# Patient Record
Sex: MALE | Race: WHITE | NOT HISPANIC OR LATINO | Employment: OTHER | URBAN - METROPOLITAN AREA
[De-identification: names, ages, dates, MRNs, and addresses within clinical notes are randomized per-mention and may not be internally consistent; named-entity substitution may affect disease eponyms.]

---

## 2017-01-02 ENCOUNTER — TRANSCRIBE ORDERS (OUTPATIENT)
Dept: LAB | Facility: CLINIC | Age: 82
End: 2017-01-02

## 2017-01-02 ENCOUNTER — APPOINTMENT (OUTPATIENT)
Dept: LAB | Facility: CLINIC | Age: 82
End: 2017-01-02
Payer: MEDICARE

## 2017-01-02 DIAGNOSIS — E11.9 TYPE 2 DIABETES MELLITUS WITHOUT COMPLICATIONS (HCC): ICD-10-CM

## 2017-01-02 DIAGNOSIS — I10 ESSENTIAL (PRIMARY) HYPERTENSION: ICD-10-CM

## 2017-01-02 DIAGNOSIS — I25.10 ATHEROSCLEROTIC HEART DISEASE OF NATIVE CORONARY ARTERY WITHOUT ANGINA PECTORIS: ICD-10-CM

## 2017-01-02 DIAGNOSIS — N28.9 DISORDER OF KIDNEY AND URETER: ICD-10-CM

## 2017-01-02 LAB
ANION GAP SERPL CALCULATED.3IONS-SCNC: 6 MMOL/L (ref 4–13)
BASOPHILS # BLD AUTO: 0.03 THOUSANDS/ΜL (ref 0–0.1)
BASOPHILS NFR BLD AUTO: 1 % (ref 0–1)
BUN SERPL-MCNC: 39 MG/DL (ref 5–25)
CALCIUM SERPL-MCNC: 8.8 MG/DL (ref 8.3–10.1)
CHLORIDE SERPL-SCNC: 107 MMOL/L (ref 100–108)
CHOLEST SERPL-MCNC: 162 MG/DL (ref 50–200)
CO2 SERPL-SCNC: 27 MMOL/L (ref 21–32)
CREAT SERPL-MCNC: 1.97 MG/DL (ref 0.6–1.3)
EOSINOPHIL # BLD AUTO: 0.37 THOUSAND/ΜL (ref 0–0.61)
EOSINOPHIL NFR BLD AUTO: 6 % (ref 0–6)
ERYTHROCYTE [DISTWIDTH] IN BLOOD BY AUTOMATED COUNT: 12.8 % (ref 11.6–15.1)
GFR SERPL CREATININE-BSD FRML MDRD: 33 ML/MIN/1.73SQ M
GLUCOSE SERPL-MCNC: 123 MG/DL (ref 65–140)
HCT VFR BLD AUTO: 38.9 % (ref 36.5–49.3)
HDLC SERPL-MCNC: 60 MG/DL (ref 40–60)
HGB BLD-MCNC: 12.7 G/DL (ref 12–17)
LDLC SERPL CALC-MCNC: 88 MG/DL (ref 0–100)
LYMPHOCYTES # BLD AUTO: 1.63 THOUSANDS/ΜL (ref 0.6–4.47)
LYMPHOCYTES NFR BLD AUTO: 25 % (ref 14–44)
MCH RBC QN AUTO: 30.4 PG (ref 26.8–34.3)
MCHC RBC AUTO-ENTMCNC: 32.6 G/DL (ref 31.4–37.4)
MCV RBC AUTO: 93 FL (ref 82–98)
MONOCYTES # BLD AUTO: 0.58 THOUSAND/ΜL (ref 0.17–1.22)
MONOCYTES NFR BLD AUTO: 9 % (ref 4–12)
NEUTROPHILS # BLD AUTO: 3.93 THOUSANDS/ΜL (ref 1.85–7.62)
NEUTS SEG NFR BLD AUTO: 59 % (ref 43–75)
NRBC BLD AUTO-RTO: 0 /100 WBCS
PLATELET # BLD AUTO: 197 THOUSANDS/UL (ref 149–390)
PMV BLD AUTO: 10.1 FL (ref 8.9–12.7)
POTASSIUM SERPL-SCNC: 4.5 MMOL/L (ref 3.5–5.3)
RBC # BLD AUTO: 4.18 MILLION/UL (ref 3.88–5.62)
SODIUM SERPL-SCNC: 140 MMOL/L (ref 136–145)
TRIGL SERPL-MCNC: 71 MG/DL
WBC # BLD AUTO: 6.55 THOUSAND/UL (ref 4.31–10.16)

## 2017-01-02 PROCEDURE — 36415 COLL VENOUS BLD VENIPUNCTURE: CPT

## 2017-01-02 PROCEDURE — 80061 LIPID PANEL: CPT

## 2017-01-02 PROCEDURE — 85025 COMPLETE CBC W/AUTO DIFF WBC: CPT

## 2017-01-02 PROCEDURE — 80048 BASIC METABOLIC PNL TOTAL CA: CPT

## 2017-01-05 ENCOUNTER — GENERIC CONVERSION - ENCOUNTER (OUTPATIENT)
Dept: OTHER | Facility: OTHER | Age: 82
End: 2017-01-05

## 2017-01-18 ENCOUNTER — ALLSCRIPTS OFFICE VISIT (OUTPATIENT)
Dept: OTHER | Facility: OTHER | Age: 82
End: 2017-01-18

## 2017-01-18 ENCOUNTER — TRANSCRIBE ORDERS (OUTPATIENT)
Dept: ADMINISTRATIVE | Facility: HOSPITAL | Age: 82
End: 2017-01-18

## 2017-01-18 ENCOUNTER — APPOINTMENT (OUTPATIENT)
Dept: LAB | Facility: HOSPITAL | Age: 82
End: 2017-01-18
Attending: INTERNAL MEDICINE
Payer: MEDICARE

## 2017-01-18 DIAGNOSIS — N18.30 CHRONIC KIDNEY DISEASE, STAGE III (MODERATE) (HCC): ICD-10-CM

## 2017-01-18 DIAGNOSIS — N18.30 CHRONIC KIDNEY DISEASE, STAGE III (MODERATE) (HCC): Primary | ICD-10-CM

## 2017-01-18 LAB
25(OH)D3 SERPL-MCNC: 17.8 NG/ML (ref 30–100)
ANION GAP SERPL CALCULATED.3IONS-SCNC: 8 MMOL/L (ref 4–13)
BACTERIA UR QL AUTO: ABNORMAL /HPF
BASOPHILS # BLD AUTO: 0.06 THOUSANDS/ΜL (ref 0–0.1)
BASOPHILS NFR BLD AUTO: 1 % (ref 0–1)
BILIRUB UR QL STRIP: NEGATIVE
BUN SERPL-MCNC: 38 MG/DL (ref 5–25)
CALCIUM SERPL-MCNC: 8.7 MG/DL (ref 8.3–10.1)
CHLORIDE SERPL-SCNC: 101 MMOL/L (ref 100–108)
CLARITY UR: CLEAR
CO2 SERPL-SCNC: 28 MMOL/L (ref 21–32)
COLOR UR: YELLOW
CREAT SERPL-MCNC: 1.89 MG/DL (ref 0.6–1.3)
EOSINOPHIL # BLD AUTO: 0.36 THOUSAND/ΜL (ref 0–0.61)
EOSINOPHIL NFR BLD AUTO: 5 % (ref 0–6)
ERYTHROCYTE [DISTWIDTH] IN BLOOD BY AUTOMATED COUNT: 12.6 % (ref 11.6–15.1)
GFR SERPL CREATININE-BSD FRML MDRD: 34.6 ML/MIN/1.73SQ M
GLUCOSE SERPL-MCNC: 283 MG/DL (ref 65–140)
GLUCOSE UR STRIP-MCNC: NEGATIVE MG/DL
HCT VFR BLD AUTO: 39.4 % (ref 36.5–49.3)
HGB BLD-MCNC: 12.6 G/DL (ref 12–17)
HGB UR QL STRIP.AUTO: NEGATIVE
HYALINE CASTS #/AREA URNS LPF: ABNORMAL /LPF
KETONES UR STRIP-MCNC: ABNORMAL MG/DL
LEUKOCYTE ESTERASE UR QL STRIP: NEGATIVE
LYMPHOCYTES # BLD AUTO: 1.67 THOUSANDS/ΜL (ref 0.6–4.47)
LYMPHOCYTES NFR BLD AUTO: 25 % (ref 14–44)
MCH RBC QN AUTO: 29.9 PG (ref 26.8–34.3)
MCHC RBC AUTO-ENTMCNC: 32 G/DL (ref 31.4–37.4)
MCV RBC AUTO: 94 FL (ref 82–98)
MONOCYTES # BLD AUTO: 0.5 THOUSAND/ΜL (ref 0.17–1.22)
MONOCYTES NFR BLD AUTO: 7 % (ref 4–12)
MUCOUS THREADS UR QL AUTO: ABNORMAL
NEUTROPHILS # BLD AUTO: 4.22 THOUSANDS/ΜL (ref 1.85–7.62)
NEUTS SEG NFR BLD AUTO: 62 % (ref 43–75)
NITRITE UR QL STRIP: NEGATIVE
NON-SQ EPI CELLS URNS QL MICRO: ABNORMAL /HPF
NRBC BLD AUTO-RTO: 0 /100 WBCS
PH UR STRIP.AUTO: 6 [PH] (ref 4.5–8)
PHOSPHATE SERPL-MCNC: 3.8 MG/DL (ref 2.3–4.1)
PLATELET # BLD AUTO: 200 THOUSANDS/UL (ref 149–390)
PMV BLD AUTO: 9.9 FL (ref 8.9–12.7)
POTASSIUM SERPL-SCNC: 4.5 MMOL/L (ref 3.5–5.3)
PROT UR STRIP-MCNC: ABNORMAL MG/DL
PTH-INTACT SERPL-MCNC: 78.9 PG/ML (ref 14–72)
RBC # BLD AUTO: 4.21 MILLION/UL (ref 3.88–5.62)
RBC #/AREA URNS AUTO: ABNORMAL /HPF
SODIUM SERPL-SCNC: 137 MMOL/L (ref 136–145)
SP GR UR STRIP.AUTO: 1.02 (ref 1–1.03)
URATE SERPL-MCNC: 7.4 MG/DL (ref 4.2–8)
UROBILINOGEN UR QL STRIP.AUTO: 0.2 E.U./DL
WBC # BLD AUTO: 6.83 THOUSAND/UL (ref 4.31–10.16)
WBC #/AREA URNS AUTO: ABNORMAL /HPF

## 2017-01-18 PROCEDURE — 81001 URINALYSIS AUTO W/SCOPE: CPT

## 2017-01-18 PROCEDURE — 80048 BASIC METABOLIC PNL TOTAL CA: CPT

## 2017-01-18 PROCEDURE — 83970 ASSAY OF PARATHORMONE: CPT

## 2017-01-18 PROCEDURE — 84550 ASSAY OF BLOOD/URIC ACID: CPT

## 2017-01-18 PROCEDURE — 82043 UR ALBUMIN QUANTITATIVE: CPT

## 2017-01-18 PROCEDURE — 82306 VITAMIN D 25 HYDROXY: CPT

## 2017-01-18 PROCEDURE — 85025 COMPLETE CBC W/AUTO DIFF WBC: CPT

## 2017-01-18 PROCEDURE — 82570 ASSAY OF URINE CREATININE: CPT

## 2017-01-18 PROCEDURE — 36415 COLL VENOUS BLD VENIPUNCTURE: CPT

## 2017-01-18 PROCEDURE — 84100 ASSAY OF PHOSPHORUS: CPT

## 2017-01-19 ENCOUNTER — GENERIC CONVERSION - ENCOUNTER (OUTPATIENT)
Dept: OTHER | Facility: OTHER | Age: 82
End: 2017-01-19

## 2017-01-19 LAB
CREAT UR-MCNC: 245 MG/DL
MICROALBUMIN UR-MCNC: 489 MG/L (ref 0–20)
MICROALBUMIN/CREAT 24H UR: 200 MG/G CREATININE (ref 0–30)

## 2017-01-20 ENCOUNTER — HOSPITAL ENCOUNTER (OUTPATIENT)
Dept: ULTRASOUND IMAGING | Facility: HOSPITAL | Age: 82
Discharge: HOME | End: 2017-01-20
Attending: INTERNAL MEDICINE
Payer: MEDICARE

## 2017-01-20 DIAGNOSIS — N18.30 CHRONIC KIDNEY DISEASE, STAGE III (MODERATE) (HCC): ICD-10-CM

## 2017-01-20 PROCEDURE — 76770 US EXAM ABDO BACK WALL COMP: CPT

## 2017-01-24 ENCOUNTER — ALLSCRIPTS OFFICE VISIT (OUTPATIENT)
Dept: OTHER | Facility: OTHER | Age: 82
End: 2017-01-24

## 2017-03-10 ENCOUNTER — ALLSCRIPTS OFFICE VISIT (OUTPATIENT)
Dept: OTHER | Facility: OTHER | Age: 82
End: 2017-03-10

## 2017-03-10 DIAGNOSIS — I25.10 ATHEROSCLEROTIC HEART DISEASE OF NATIVE CORONARY ARTERY WITHOUT ANGINA PECTORIS: ICD-10-CM

## 2017-03-10 DIAGNOSIS — R06.09 OTHER FORMS OF DYSPNEA: ICD-10-CM

## 2017-03-23 ENCOUNTER — HOSPITAL ENCOUNTER (OUTPATIENT)
Dept: NON INVASIVE DIAGNOSTICS | Facility: CLINIC | Age: 82
Discharge: HOME | End: 2017-03-23
Payer: MEDICARE

## 2017-03-23 ENCOUNTER — GENERIC CONVERSION - ENCOUNTER (OUTPATIENT)
Dept: OTHER | Facility: OTHER | Age: 82
End: 2017-03-23

## 2017-03-23 DIAGNOSIS — R06.09 OTHER FORMS OF DYSPNEA: ICD-10-CM

## 2017-03-23 DIAGNOSIS — I25.10 ATHEROSCLEROTIC HEART DISEASE OF NATIVE CORONARY ARTERY WITHOUT ANGINA PECTORIS: ICD-10-CM

## 2017-03-23 PROCEDURE — 93017 CV STRESS TEST TRACING ONLY: CPT

## 2017-03-23 PROCEDURE — A9502 TC99M TETROFOSMIN: HCPCS

## 2017-03-23 PROCEDURE — 78452 HT MUSCLE IMAGE SPECT MULT: CPT

## 2017-03-24 LAB
MAX DIASTOLIC BP: 74 MMHG
MAX HEART RATE: 72 BPM
MAX PREDICTED HEART RATE: 141 BPM
MAX. SYSTOLIC BP: 136 MMHG
PROTOCOL NAME: NORMAL
REASON FOR TERMINATION: NORMAL
TARGET HR FORMULA: NORMAL
TIME IN EXERCISE PHASE: 180 S

## 2017-03-28 ENCOUNTER — HOSPITAL ENCOUNTER (OUTPATIENT)
Dept: NON INVASIVE DIAGNOSTICS | Facility: CLINIC | Age: 82
Discharge: HOME | End: 2017-03-28
Payer: MEDICARE

## 2017-03-28 DIAGNOSIS — R06.09 OTHER FORMS OF DYSPNEA: ICD-10-CM

## 2017-03-28 PROCEDURE — 93306 TTE W/DOPPLER COMPLETE: CPT

## 2017-03-29 ENCOUNTER — GENERIC CONVERSION - ENCOUNTER (OUTPATIENT)
Dept: OTHER | Facility: OTHER | Age: 82
End: 2017-03-29

## 2017-04-18 ENCOUNTER — ALLSCRIPTS OFFICE VISIT (OUTPATIENT)
Dept: OTHER | Facility: OTHER | Age: 82
End: 2017-04-18

## 2017-04-24 ENCOUNTER — APPOINTMENT (OUTPATIENT)
Dept: LAB | Facility: CLINIC | Age: 82
End: 2017-04-24
Payer: MEDICARE

## 2017-04-24 DIAGNOSIS — L98.9 DISORDER OF SKIN OR SUBCUTANEOUS TISSUE: ICD-10-CM

## 2017-04-24 DIAGNOSIS — N28.9 DISORDER OF KIDNEY AND URETER: ICD-10-CM

## 2017-04-24 DIAGNOSIS — I25.10 ATHEROSCLEROTIC HEART DISEASE OF NATIVE CORONARY ARTERY WITHOUT ANGINA PECTORIS: ICD-10-CM

## 2017-04-24 DIAGNOSIS — E11.9 TYPE 2 DIABETES MELLITUS WITHOUT COMPLICATIONS (HCC): ICD-10-CM

## 2017-04-24 LAB
ANION GAP SERPL CALCULATED.3IONS-SCNC: 6 MMOL/L (ref 4–13)
BASOPHILS # BLD AUTO: 0.03 THOUSANDS/ΜL (ref 0–0.1)
BASOPHILS NFR BLD AUTO: 1 % (ref 0–1)
BUN SERPL-MCNC: 24 MG/DL (ref 5–25)
CALCIUM SERPL-MCNC: 9.2 MG/DL (ref 8.3–10.1)
CHLORIDE SERPL-SCNC: 104 MMOL/L (ref 100–108)
CHOLEST SERPL-MCNC: 171 MG/DL (ref 50–200)
CO2 SERPL-SCNC: 29 MMOL/L (ref 21–32)
CREAT SERPL-MCNC: 1.39 MG/DL (ref 0.6–1.3)
EOSINOPHIL # BLD AUTO: 0.26 THOUSAND/ΜL (ref 0–0.61)
EOSINOPHIL NFR BLD AUTO: 5 % (ref 0–6)
ERYTHROCYTE [DISTWIDTH] IN BLOOD BY AUTOMATED COUNT: 13.1 % (ref 11.6–15.1)
EST. AVERAGE GLUCOSE BLD GHB EST-MCNC: 163 MG/DL
GFR SERPL CREATININE-BSD FRML MDRD: 49.3 ML/MIN/1.73SQ M
GLUCOSE P FAST SERPL-MCNC: 107 MG/DL (ref 65–99)
HBA1C MFR BLD: 7.3 % (ref 4.2–6.3)
HCT VFR BLD AUTO: 39.4 % (ref 36.5–49.3)
HDLC SERPL-MCNC: 49 MG/DL (ref 40–60)
HGB BLD-MCNC: 13.1 G/DL (ref 12–17)
LDLC SERPL CALC-MCNC: 107 MG/DL (ref 0–100)
LYMPHOCYTES # BLD AUTO: 1.49 THOUSANDS/ΜL (ref 0.6–4.47)
LYMPHOCYTES NFR BLD AUTO: 31 % (ref 14–44)
MCH RBC QN AUTO: 30.6 PG (ref 26.8–34.3)
MCHC RBC AUTO-ENTMCNC: 33.2 G/DL (ref 31.4–37.4)
MCV RBC AUTO: 92 FL (ref 82–98)
MONOCYTES # BLD AUTO: 0.45 THOUSAND/ΜL (ref 0.17–1.22)
MONOCYTES NFR BLD AUTO: 9 % (ref 4–12)
NEUTROPHILS # BLD AUTO: 2.61 THOUSANDS/ΜL (ref 1.85–7.62)
NEUTS SEG NFR BLD AUTO: 54 % (ref 43–75)
NRBC BLD AUTO-RTO: 0 /100 WBCS
PLATELET # BLD AUTO: 206 THOUSANDS/UL (ref 149–390)
PMV BLD AUTO: 10.2 FL (ref 8.9–12.7)
POTASSIUM SERPL-SCNC: 4.6 MMOL/L (ref 3.5–5.3)
RBC # BLD AUTO: 4.28 MILLION/UL (ref 3.88–5.62)
SODIUM SERPL-SCNC: 139 MMOL/L (ref 136–145)
TRIGL SERPL-MCNC: 74 MG/DL
WBC # BLD AUTO: 4.85 THOUSAND/UL (ref 4.31–10.16)

## 2017-04-24 PROCEDURE — 80048 BASIC METABOLIC PNL TOTAL CA: CPT

## 2017-04-24 PROCEDURE — 80061 LIPID PANEL: CPT

## 2017-04-24 PROCEDURE — 83036 HEMOGLOBIN GLYCOSYLATED A1C: CPT

## 2017-04-24 PROCEDURE — 85025 COMPLETE CBC W/AUTO DIFF WBC: CPT

## 2017-04-24 PROCEDURE — 36415 COLL VENOUS BLD VENIPUNCTURE: CPT

## 2017-05-01 ENCOUNTER — GENERIC CONVERSION - ENCOUNTER (OUTPATIENT)
Dept: OTHER | Facility: OTHER | Age: 82
End: 2017-05-01

## 2017-05-01 ENCOUNTER — ALLSCRIPTS OFFICE VISIT (OUTPATIENT)
Dept: OTHER | Facility: OTHER | Age: 82
End: 2017-05-01

## 2017-09-01 DIAGNOSIS — M54.9 DORSALGIA: ICD-10-CM

## 2017-09-01 DIAGNOSIS — Z00.00 ENCOUNTER FOR GENERAL ADULT MEDICAL EXAMINATION WITHOUT ABNORMAL FINDINGS: ICD-10-CM

## 2017-09-01 DIAGNOSIS — I10 ESSENTIAL (PRIMARY) HYPERTENSION: ICD-10-CM

## 2017-09-01 DIAGNOSIS — E78.5 HYPERLIPIDEMIA: ICD-10-CM

## 2017-09-01 DIAGNOSIS — E11.9 TYPE 2 DIABETES MELLITUS WITHOUT COMPLICATIONS (HCC): ICD-10-CM

## 2017-09-01 DIAGNOSIS — I25.10 ATHEROSCLEROTIC HEART DISEASE OF NATIVE CORONARY ARTERY WITHOUT ANGINA PECTORIS: ICD-10-CM

## 2017-09-02 ENCOUNTER — TRANSCRIBE ORDERS (OUTPATIENT)
Dept: LAB | Facility: CLINIC | Age: 82
End: 2017-09-02

## 2017-09-02 ENCOUNTER — APPOINTMENT (OUTPATIENT)
Dept: LAB | Facility: CLINIC | Age: 82
End: 2017-09-02
Payer: MEDICARE

## 2017-09-02 DIAGNOSIS — Z00.00 ENCOUNTER FOR GENERAL ADULT MEDICAL EXAMINATION WITHOUT ABNORMAL FINDINGS: ICD-10-CM

## 2017-09-02 DIAGNOSIS — M54.9 DORSALGIA: ICD-10-CM

## 2017-09-02 DIAGNOSIS — E11.9 TYPE 2 DIABETES MELLITUS WITHOUT COMPLICATIONS (HCC): ICD-10-CM

## 2017-09-02 DIAGNOSIS — I25.10 ATHEROSCLEROTIC HEART DISEASE OF NATIVE CORONARY ARTERY WITHOUT ANGINA PECTORIS: ICD-10-CM

## 2017-09-02 DIAGNOSIS — E78.5 HYPERLIPIDEMIA: ICD-10-CM

## 2017-09-02 DIAGNOSIS — I10 ESSENTIAL (PRIMARY) HYPERTENSION: ICD-10-CM

## 2017-09-02 LAB
ANION GAP SERPL CALCULATED.3IONS-SCNC: 4 MMOL/L (ref 4–13)
BUN SERPL-MCNC: 30 MG/DL (ref 5–25)
CALCIUM SERPL-MCNC: 9.1 MG/DL (ref 8.3–10.1)
CHLORIDE SERPL-SCNC: 108 MMOL/L (ref 100–108)
CHOLEST SERPL-MCNC: 181 MG/DL (ref 50–200)
CO2 SERPL-SCNC: 27 MMOL/L (ref 21–32)
CREAT SERPL-MCNC: 1.68 MG/DL (ref 0.6–1.3)
EST. AVERAGE GLUCOSE BLD GHB EST-MCNC: 160 MG/DL
GFR SERPL CREATININE-BSD FRML MDRD: 38 ML/MIN/1.73SQ M
GLUCOSE P FAST SERPL-MCNC: 96 MG/DL (ref 65–99)
HBA1C MFR BLD: 7.2 % (ref 4.2–6.3)
HDLC SERPL-MCNC: 52 MG/DL (ref 40–60)
LDLC SERPL CALC-MCNC: 112 MG/DL (ref 0–100)
POTASSIUM SERPL-SCNC: 4.5 MMOL/L (ref 3.5–5.3)
SODIUM SERPL-SCNC: 139 MMOL/L (ref 136–145)
TRIGL SERPL-MCNC: 85 MG/DL

## 2017-09-02 PROCEDURE — 36415 COLL VENOUS BLD VENIPUNCTURE: CPT

## 2017-09-02 PROCEDURE — 83036 HEMOGLOBIN GLYCOSYLATED A1C: CPT

## 2017-09-02 PROCEDURE — 80061 LIPID PANEL: CPT

## 2017-09-02 PROCEDURE — 80048 BASIC METABOLIC PNL TOTAL CA: CPT

## 2017-09-07 ENCOUNTER — ALLSCRIPTS OFFICE VISIT (OUTPATIENT)
Dept: OTHER | Facility: OTHER | Age: 82
End: 2017-09-07

## 2017-09-08 ENCOUNTER — LAB REQUISITION (OUTPATIENT)
Dept: LAB | Facility: HOSPITAL | Age: 82
End: 2017-09-08
Payer: MEDICARE

## 2017-09-08 ENCOUNTER — ALLSCRIPTS OFFICE VISIT (OUTPATIENT)
Dept: OTHER | Facility: OTHER | Age: 82
End: 2017-09-08

## 2017-09-08 DIAGNOSIS — L98.9 DISORDER OF SKIN OR SUBCUTANEOUS TISSUE: ICD-10-CM

## 2017-09-08 PROCEDURE — 88305 TISSUE EXAM BY PATHOLOGIST: CPT | Performed by: DERMATOLOGY

## 2017-09-18 ENCOUNTER — GENERIC CONVERSION - ENCOUNTER (OUTPATIENT)
Dept: OTHER | Facility: OTHER | Age: 82
End: 2017-09-18

## 2017-09-20 ENCOUNTER — GENERIC CONVERSION - ENCOUNTER (OUTPATIENT)
Dept: OTHER | Facility: OTHER | Age: 82
End: 2017-09-20

## 2017-09-20 ENCOUNTER — APPOINTMENT (OUTPATIENT)
Dept: LAB | Facility: CLINIC | Age: 82
End: 2017-09-20
Payer: MEDICARE

## 2017-09-20 ENCOUNTER — TRANSCRIBE ORDERS (OUTPATIENT)
Dept: LAB | Facility: CLINIC | Age: 82
End: 2017-09-20

## 2017-09-20 DIAGNOSIS — R97.20 ELEVATED PROSTATE SPECIFIC ANTIGEN (PSA): Primary | ICD-10-CM

## 2017-09-20 DIAGNOSIS — R97.20 ELEVATED PROSTATE SPECIFIC ANTIGEN (PSA): ICD-10-CM

## 2017-09-20 LAB — PSA SERPL-MCNC: 7.2 NG/ML (ref 0–4)

## 2017-09-20 PROCEDURE — 84153 ASSAY OF PSA TOTAL: CPT

## 2017-10-02 ENCOUNTER — ALLSCRIPTS OFFICE VISIT (OUTPATIENT)
Dept: OTHER | Facility: OTHER | Age: 82
End: 2017-10-02

## 2017-10-03 NOTE — PROGRESS NOTES
Assessment  Assessed    1  2-vessel coronary artery disease (414 00) (I25 10)   2  Encounter for monitoring antiplatelet therapy (J26 96,K11 95) (Z51 81,Z79 02)   3  Hyperlipidemia (272 4) (E78 5)   4  Hypertension (401 9) (I10)    Discussion/Summary  Cardiology Discussion Summary Free Text Note Form [facility]:   Patient with multiple medical problems who seems to be doing reasonably well from cardiac standpoint  Previous studies reviewed with patient  Medications reviewed and possible side effects discussed  concepts of cardiovascular disease , signs and symptoms of heart disease  Dietary and risk factor modification reinforced  All questions answered  Safety measures reviewed  Patient advised to report any problems prompting medical attention  Symptoms to watch out from cardiac standpoint discussed at length with patient  Patient has no specific contraindication to proceed with the planned surgery  Patient has been instructed to continue all his medications including anti-platelet agents  Patient had a few questions which were answered  Previous cardiac studies were reviewed with the patient  Follow-up in 6 months  Follow-up with Dr Humble Lora  Goals and Barriers: The patient has the current Goals: Compliance with medications none  The patent has the current Barriers: None  Patient's Capacity to Self-Care: Patient is able to Self-Care  Patient Education: Educational resources provided: Please see discussion summary  Medication SE Review and Pt Understands Tx: Possible side effects of new medications were reviewed with the patient/guardian today  Counseling Documentation With Imm: The patient was counseled regarding instructions for management,-risk factor reductions,-impressions,-risks and benefits of treatment options,-importance of compliance with treatment  Chief Complaint  Chief Complaint Chronic Condition [facility]: Patient is here today for follow up of chronic conditions described in HPI  History of Present Illness  Cardiology [facility] Free Text Note Form [facility]: Patient presents for follow-up visit  Patient denies any history of chest pain shortness of breath  Patient denies any history of leg edema or orthopnea PND  No history of presyncope syncope  Patient states compliance with the present list of medications  Patient undergoing Moh's surgery at [facility] in the next 1 to 2 weeks  Patient denies any bleeding issues  Review of Systems  Cardiology Male ROS:     Cardiac: as noted in HPI  Skin: as noted in HPI   Genitourinary: No complaints of recurrent urinary tract infections, frequent urination at night, difficult urination, blood in urine, kidney stones, loss of bladder control, no kidney or prostate problems, no erectile dysfunction  Psychological: No complaints of feeling depressed, anxiety, panic attacks, or difficulty concentrating  General: No complaints of trouble sleeping, lack of energy, fatigue, appetite changes, weight changes, fever, frequent infections, or night sweats  Respiratory: as noted in HPI  HEENT: No complaints of serious problems, hearing problems, nose problems, throat problems, or snoring  Gastrointestinal: No complaints of liver problems, nausea, vomiting, heartburn, constipation, bloody stools, diarrhea, problems swallowing, adbominal pain, or rectal bleeding  Hematologic: No complaints of bleeding disorders, anemia, blood clots, or excessive brusing  Neurological: No complaints of numbness, tingling, dizziness, weakness, seizures, headaches, syncope or fainting, AM fatigue, daytime sleepiness, no witnessed apnea episodes  Musculoskeletal: arthritis   ROS Reviewed:   ROS reviewed  Active Problems  Problems    1  2-vessel coronary artery disease (414 00) (I25 10)   2  Abdominal pain, LLQ (left lower quadrant) (789 04) (R10 32)   3  Active advance directive (V49 89) (Z78 9)   4  Acute anterior circulation TIA (435 9) (G45 8)   5   Acute ST segment elevation MI (410 90) (I21 3)   6  Aortic valve disorder (424 1) (I35 9)   7  Backache (724 5) (M54 9)   8  Benign hypertension with chronic kidney disease (403 10,585 9) (I12 9)   9  Changing skin lesion (709 9) (L98 9)   10  Chest pain (786 50) (R07 9)   11  Coronary arteriosclerosis (414 00) (I25 10)   12  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)   13  Dyspnea on effort (786 09) (R06 09)   14  Encounter for monitoring antiplatelet therapy (W32 08,H09 51) (Z51 81,Z79 02)   15  Hematuria (599 70) (R31 9)   16  Hyperlipidemia (272 4) (E78 5)   17  Hypertension (401 9) (I10)   18  Limb pain (729 5) (M79 609)   19  No advance directive on file (V49 89) (Z78 9)   20  Obesity (278 00) (E66 9)   21  Proteinuria (791 0) (R80 9)   22  Renal insufficiency (593 9) (N28 9)   23  S/P cardiac cath (V45 89) (Z98 890)   24  Screening for genitourinary condition (V81 6) (Z13 89)   25  Screening for skin condition (V82 0) (Z13 89)   26  Seborrheic keratosis (702 19) (L82 1)   27  Stage III chronic kidney disease (585 3) (N18 3)   28  Type 2 diabetes mellitus (250 00) (E11 9)   29  Vitamin D deficiency (268 9) (E55 9)    Past Medical History  Problems    1  History of Benign prostatic hypertrophy with lower urinary tract symptoms (LUTS)   (600 01) (N40 1)   2  History of Colonoscopy (Fiberoptic)   3  History of Coronary arteriosclerosis (414 00) (I25 10)   4  History of aortic valve disorder (V12 59) (Z86 79)   5  History of hyperlipidemia (V12 29) (Z86 39)   6  History of hypertension (V12 59) (Z86 79)   7  History of shortness of breath (V13 89) (Z87 898)   8  History of transient cerebral ischemia (V12 54) (Z86 73)   9  History of Neuralgia (729 2) (M79 2)   10  History of Type 2 diabetes mellitus (250 00) (E11 9)  Active Problems And Past Medical History Reviewed: The active problems and past medical history were reviewed and updated today  Surgical History  Problems    1   History of Cardiac Cath Procedure Outcome: Successful   2  History of Cath Stent Placement   3  History of Coronary Artery Surgery   4  History of Hand Surgery   5  History of Hip Replacement  Surgical History Reviewed: The surgical history was reviewed and updated today  Family History  Mother    1  Family history of Family Health Status Of Mother -   Father    2  Family history of Chronic Obstructive Pulmonary Disease   3  Family history of Family Health Status Of Father -   Family History Reviewed: The family history was reviewed and updated today  Social History  Problems    · Active advance directive (V49 89) (Z78 9)   · Denied: History of Alcohol Use (History)   · Never A Smoker   · No advance directive on file (V49 89) (Z78 9)   · Tobacco non-user (V49 89) (Z78 9)  Social History Reviewed: The social history was reviewed and updated today  Current Meds   1  AmLODIPine Besylate 10 MG Oral Tablet; TAKE 1 TABLET BY MOUTH IN THE MORNING; Therapy: 88MEM4381 to (Evaluate:30Ung2957)  Requested for: 07MZQ0707; Last   Rx:59Zku9620 Ordered   2  Aspirin 81 MG Oral Tablet Delayed Release; Therapy: (Recorded:29Nwk8741) to Recorded   3  Clopidogrel Bisulfate 75 MG Oral Tablet; take one tablet by mouth one time daily; Therapy: 95JSN5305 to (WWFQGWQK:02ESO5365)  Requested for: 91MML5120; Last   Rx:2017 Ordered   4  Doxazosin Mesylate 4 MG Oral Tablet; take one tablet by mouth one time daily; Therapy: 10EQJ7727 to (Evaluate:57Rdv3763)  Requested for: 20COM2262; Last   Rx:12Owh3909 Ordered   5  GlyBURIDE 5 MG Oral Tablet; TAKE 2 TABLETS BY MOUTH IN THE MORNING AND TAKE   1 TABLET IN THE EVENING; Therapy: 63FLU6559 to (SMIQLPNL:51RQC0747)  Requested for: 63Bdr2172; Last   Rx:44Xbt3545 Ordered   6  Lisinopril 40 MG Oral Tablet; TAKE ONE TABLET BY MOUTH IN THE MORNING AND   ONE-HALF IN THE P M;   Therapy: 63Erh7838 to (Evaluate:71Xhm9879)  Requested for: 37Kvp3722; Last   Rx:61Xhf3739 Ordered   7   Metoprolol Succinate ER 25 MG Oral Tablet Extended Release 24 Hour; take 1 tablet by   mouth twice a day; Therapy: 41PDA7679 to (Dulce Cooper)  Requested for: 83Yba9344; Last   Rx:69Rls1519 Ordered   8  OneTouch Ultra Blue In Vitro Strip; USE TO TEST BLOOD SUGAR TWICE DAILY; Therapy: 04QZX7624 to (Evaluate:60Qji5355)  Requested for: 14NFO6938; Last   Rx:06Wrv6763 Ordered   9  OneTouch Ultra Mini KIT; USE BID DAILY ; Therapy: (YTQDIETM:85GLE9756) to Recorded   10  Oxybutynin Chloride 5 MG Oral Tablet; take one tablet by mouth one time daily; Therapy: 29Tit4974 to (Evaluate:95Nsk9378)  Requested for: 68Olv1577; Last    Rx:61Ipr6615 Ordered  Medication List Reviewed: The medication list was reviewed and updated today  Allergies  Medication    1  CeleBREX CAPS   2  Dilaudid TABS   3  Statins    Vitals  Vital Signs    Recorded: 73XPJ7774 08:50AM   Heart Rate 68   Systolic 671   Diastolic 78   Height 5 ft 10 in   Weight 221 lb    BMI Calculated 31 71   BSA Calculated 2 18   O2 Saturation 97     Physical Exam    Constitutional   General appearance: No acute distress, well appearing and well nourished  Eyes   Conjunctiva and Sclera examination: Conjunctiva pink, sclera anicteric  Ears, Nose, Mouth, and Throat - External inspection of ears and nose: Normal without deformities or discharge -Oropharynx: Clear, nares are clear, mucous membranes are moist    Pulmonary   Respiratory effort: No increased work of breathing or signs of respiratory distress  Auscultation of lungs: Clear to auscultation, no rales, no rhonchi, no wheezing, good air movement  Cardiovascular   Palpation of heart: Normal PMI, no thrills  Auscultation of heart: Normal rate and rhythm, normal S1 and S2, no murmurs  Carotid pulses: Normal, 2+ bilaterally  Peripheral vascular exam: Normal pulses throughout, no tenderness, erythema or swelling  Musculoskeletal Gait and station: Normal gait     Neurologic - Speech: Normal     Psychiatric - Orientation to person, place, and time: Normal -Mood and affect: Normal       Health Management  Health Maintenance   COLONOSCOPY; every 10 years; Next Due: 48WLI6920; Overdue    Future Appointments    Date/Time Provider Specialty Site   01/11/2018 11:15 AM [facility]   03/14/2018 09:15 AM TARAS Jackson   [facility]     Signatures   Electronically signed by : TARAS Alvarenga ; Oct  2 2017  2:35PM EST                       (Author)

## 2017-10-11 ENCOUNTER — GENERIC CONVERSION - ENCOUNTER (OUTPATIENT)
Dept: OTHER | Facility: OTHER | Age: 82
End: 2017-10-11

## 2017-10-17 ENCOUNTER — GENERIC CONVERSION - ENCOUNTER (OUTPATIENT)
Dept: OTHER | Facility: OTHER | Age: 82
End: 2017-10-17

## 2017-10-26 NOTE — PROGRESS NOTES
Assessment  1  Tobacco non-user (V49 89) (Z78 9)   2  2-vessel coronary artery disease (414 00) (I25 10)   3  Benign hypertension with chronic kidney disease (403 10,585 9) (I12 9)   4  Stage III chronic kidney disease (585 3) (N18 3)   5  Changing skin lesion (709 9) (L98 9)    Plan   2-vessel coronary artery disease, Benign hypertension with chronic kidney disease, Stage III  chronic kidney disease    · (1) BASIC METABOLIC PROFILE; Status:Active; Requested YJE:57QOS1833;   2-vessel coronary artery disease, Benign hypertension with chronic kidney disease, Stage III  chronic kidney disease, Type 2 diabetes mellitus    · (1) HEMOGLOBIN A1C; Status:Active; Requested HIE:73IYF3533;   Hypertension    · Metoprolol Succinate ER 25 MG Oral Tablet Extended Release 24 Hour; take 1 tablet by  mouth twice a day  Proteinuria, Renal insufficiency    · The plan of care for urinary incontinence is detailed in the plan and/or discussion section of today's  note ; Status:Complete;   Done: 37HGM8029  Type 2 diabetes mellitus    · GlyBURIDE 5 MG Oral Tablet; TAKE 2 TABLETS BY MOUTH IN THE MORNING AND TAKE 1  TABLET IN THE EVENING    1 - Mayco SHAHID, Liseth Wood  (Dermatology) Co-Management  *  Status: Hold For - Scheduling  Requested for: 37VZZ6995  Ordered; For: Changing skin lesion;  Ordered By: Luarie Thomas  Performed:   Due: 65Jrh5739  Changing skin lesion (539 9) (L98 9)          Discussion/Summary  Discussion Summary:   Regarding the skin lesion he is going to see the dermatologist   his diabetes is recently well controlled with an A1c of 7 2   his urological issues he is going to see Dr Rutledge Reveal  He sees the nephrologist for his chronic kidney disease  I will see him back here in 4 months  Before for any problems        Chief Complaint  Chief Complaint Free Text Note Form: Problems are #1 coronary artery disease #2 hyperlipidemia #3 diabetes #4 chronic kidney disease #5 skin lesion      History of Present Illness  HPI: He comes in for follow-up  He says he is feeling fairly well  He has a but appears to be a skin cancer on the lateral aspect of his nose  He's been advised multiple times to have this checked but never did so  He will do so at this time  He is aware that this represents a cancer in all likelihood  he says he is feeling well with no angina  taking his medications  He sees Dr Batsheva Lan for his prostate and is up-to-date on colonoscopies  Review of Systems  Complete-Male:   Constitutional: as noted in HPI  Eyes: He is up-to-date on eye checkups  , but-- No complaints of eye pain, no red eyes, no discharge from eyes, no itchy eyes  ENT: no complaints of earache, no hearing loss, no nosebleeds, no nasal discharge, no sore throat, no hoarseness  Cardiovascular: He's had no chest pains  , but-- No complaints of slow heart rate, no fast heart rate, no chest pain, no palpitations, no leg claudication, no lower extremity,-- no chest pain-- and-- no palpitations  Respiratory: No complaints of shortness of breath, no wheezing, no cough, no SOB on exertion, no orthopnea or PND  Gastrointestinal: Up-to-date on colonoscopies, but-- No complaints of abdominal pain, no constipation, no nausea or vomiting, no diarrhea or bloody stools  Genitourinary: He sees the urologist and has low-grade chronic kidney disease, but-- as noted in HPI  Musculoskeletal: arthralgias-- and-- joint stiffness  Integumentary: No complaints of skin rash or skin lesions, no itching, no skin wound, no dry skin  Neurological: No compliants of headache, no confusion, no convulsions, no numbness or tingling, no dizziness or fainting, no limb weakness, no difficulty walking  Psychiatric: Is not suicidal, no sleep disturbances, no anxiety or depression, no change in personality, no emotional problems  Endocrine: His type 2 diabetes, but-- as noted in HPI  Active Problems   1  2-vessel coronary artery disease (414 00) (I25 10)   2   Abdominal pain, LLQ (left lower quadrant) (789 04) (R10 32)   3  Active advance directive (V49 89) (Z78 9)   4  Acute anterior circulation TIA (435 9) (G45 8)   5  Acute ST segment elevation MI (410 90) (I21 3)   6  Aortic valve disorder (424 1) (I35 9)   7  Backache (724 5) (M54 9)   8  Benign hypertension with chronic kidney disease (403 10,585 9) (I12 9)   9  Chest pain (786 50) (R07 9)   10  Coronary arteriosclerosis (414 00) (I25 10)   11  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)   12  Dyspnea on effort (786 09) (R06 09)   13  Encounter for monitoring antiplatelet therapy (N80 41,T66 64) (Z51 81,Z79 02)   14  Hematuria (599 70) (R31 9)   15  Hyperlipidemia (272 4) (E78 5)   16  Hypertension (401 9) (I10)   17  Limb pain (729 5) (M79 609)   18  No advance directive on file (V49 89) (Z78 9)   19  Obesity (278 00) (E66 9)   20  Proteinuria (791 0) (R80 9)   21  Renal insufficiency (593 9) (N28 9)   22  S/P cardiac cath (V45 89) (Z98 890)   23  Screening for genitourinary condition (V81 6) (Z13 89)   24  Stage III chronic kidney disease (585 3) (N18 3)   25  Type 2 diabetes mellitus (250 00) (E11 9)   26  Vitamin D deficiency (268 9) (E55 9)    Changing skin lesion (709 9) (L98 9)          Past Medical History  1  History of Benign prostatic hypertrophy with lower urinary tract symptoms (LUTS) (600 01) (N40 1)   2  History of Colonoscopy (Fiberoptic)   3  History of Coronary arteriosclerosis (414 00) (I25 10)   4  History of aortic valve disorder (V12 59) (Z86 79)   5  History of hyperlipidemia (V12 29) (Z86 39)   6  History of hypertension (V12 59) (Z86 79)   7  History of shortness of breath (V13 89) (Z87 898)   8  History of transient cerebral ischemia (V12 54) (Z86 73)   9  History of Neuralgia (729 2) (M79 2)   10  History of Type 2 diabetes mellitus (250 00) (E11 9)  Active Problems And Past Medical History Reviewed: The active problems and past medical history were reviewed and updated today        Surgical History  1  History of Cardiac Cath Procedure Outcome: Successful   2  History of Cath Stent Placement   3  History of Coronary Artery Surgery   4  History of Hand Surgery   5  History of Hip Replacement  Surgical History Reviewed: The surgical history was reviewed and updated today  Family History  Mother    1  Family history of Family Health Status Of Mother -   Father    2  Family history of Chronic Obstructive Pulmonary Disease   3  Family history of Family Health Status Of Father -   Family History Reviewed: The family history was reviewed and updated today  Social History   · Active advance directive (V49 89) (Z78 9)   · Denied: History of Alcohol Use (History)   · Never A Smoker   · No advance directive on file (V49 89) (Z78 9)   · Tobacco non-user (V49 89) (Z78 9)  Social History Reviewed: The social history was reviewed and updated today  The social history was reviewed and is unchanged  Current Meds   1  AmLODIPine Besylate 10 MG Oral Tablet; TAKE 1 TABLET BY MOUTH IN THE MORNING; Therapy: 31BNI1868 to (Evaluate:79Oub8157)  Requested for: 99BVO8010; Last Rx:17Qxf7536   Ordered   2  Aspirin 81 MG Oral Tablet Delayed Release; Therapy: (Recorded:38Puy8436) to Recorded   3  Clopidogrel Bisulfate 75 MG Oral Tablet; take one tablet by mouth one time daily; Therapy: 72WYT9818 to (PUEHIQCZ:56OOT3559)  Requested for: 63GTG8034; Last Rx:2017   Ordered   4  Doxazosin Mesylate 4 MG Oral Tablet; take one tablet by mouth one time daily; Therapy: 46EUZ3546 to (Evaluate:2018)  Requested for: 66VUX2669; Last Rx:71Xlu9381   Ordered   5  GlyBURIDE 5 MG Oral Tablet; TAKE 2 TABLETS BY MOUTH IN THE MORNING AND TAKE 1 TABLET IN   THE EVENING; Therapy: 93RHV9379 to (Nancy Leal)  Requested for: 38Xdx3438; Last Rx:10Rsk8324   Ordered   6   Lisinopril 40 MG Oral Tablet; TAKE ONE TABLET BY MOUTH IN THE MORNING AND ONE-HALF IN THE   P M;   Therapy: 15Sek1540 to (Evaluate:87Kkq5460)  Requested for: 19Qoz2132; Last Rx:96Ifl9872   Ordered   7  Metoprolol Succinate ER 25 MG Oral Tablet Extended Release 24 Hour; take 1 tablet by mouth twice   a day; Therapy: 30KNW7878 to (Evaluate:00Ofk9741)  Requested for: 96RPO6615; Last Rx:15Ktr8947   Ordered   8  OneTouch Ultra Blue In Vitro Strip; USE TO TEST BLOOD SUGAR TWICE DAILY; Therapy: 81ENZ1454 to (Evaluate:54Bin6934)  Requested for: 01YVK3641; Last Rx:16Dfu8222   Ordered   9  OneTouch Ultra Mini KIT; USE BID DAILY ; Therapy: (PJBVMBYY:93DSF6862) to Recorded   10  Oxybutynin Chloride 5 MG Oral Tablet; take one tablet by mouth one time daily; Therapy: 97Pch7310 to (Evaluate:25Ehu8676)  Requested for: 56Uvu5476; Last Rx:82Ihr3225    Ordered  Medication List Reviewed: The medication list was reviewed and updated today  Allergies  1  CeleBREX CAPS   2  Dilaudid TABS   3  Statins    Vitals  Vital Signs    Recorded: 07Sep2017 11:53AM   Heart Rate 58   Systolic 411   Diastolic 76   Height 5 ft 10 in   Weight 221 lb    BMI Calculated 31 71   BSA Calculated 2 18   O2 Saturation 96     Physical Exam    Constitutional   General appearance: Abnormal  -- He has a lesion on the right side of his nose  He apparently was picking at it and it bled  He needs to have this checked as I believe it represents a skin cancer  Eyes   Conjunctiva and lids: No swelling, erythema, or discharge  Pupils and irises: Equal, round and reactive to light  Ears, Nose, Mouth, and Throat   External inspection of ears and nose: Abnormal  -- Lesion on the right side of his nose  Otoscopic examination: Tympanic membrance translucent with normal light reflex  Canals patent without erythema  Nasal mucosa, septum, and turbinates: Normal without edema or erythema  Oropharynx: Normal with no erythema, edema, exudate or lesions  Pulmonary   Auscultation of lungs: Abnormal  -- Breath sounds are diminished     Cardiovascular   Auscultation of heart: Abnormal  -- Rhythm is regular with no murmur  Examination of extremities for edema and/or varicosities: Abnormal  -- Trace of ankle edema  Carotid pulses: Normal     Abdomen   Abdomen: Abnormal  -- Abdomen several weight  Liver and spleen: No hepatomegaly or splenomegaly  Lymphatic   Palpation of lymph nodes in neck: No lymphadenopathy  Musculoskeletal   Inspection/palpation of joints, bones, and muscles: Abnormal  -- Osteoarthritis of his knees  Skin   Skin and subcutaneous tissue: Abnormal  -- Lesion on his nose  Neurologic   Cranial nerves: Cranial nerves 2-12 intact  Sensation: No sensory loss  Psychiatric   Orientation to person, place and time: Normal     Mood and affect: Normal          Results/Data  PHQ-9 Adult Depression Screening 08Ujx5177 11:51AM User, Ahs     Test Name Result Flag Reference   PHQ-9 Adult Depression Score 1     Over the last two weeks, how often have you been bothered by any of the following problems? Little interest or pleasure in doing things: Not at all - 0  Feeling down, depressed, or hopeless: Not at all - 0  Trouble falling or staying asleep, or sleeping too much: Not at all - 0  Feeling tired or having little energy: Several days - 1  Poor appetite or over eating: Not at all - 0  Feeling bad about yourself - or that you are a failure or have let yourself or your family down: Not at all - 0  Trouble concentrating on things, such as reading the newspaper or watching television: Not at all - 0  Moving or speaking so slowly that other people could have noticed   Or the opposite -  being so fidgety or restless that you have been moving around a lot more than usual: Not at all - 0  Thoughts that you would be better off dead, or of hurting yourself in some way: Not at all - 0   PHQ-9 Adult Depression Screening Negative     PHQ-9 Difficulty Level Not difficult at all     PHQ-9 Severity Minimal Depression       Falls Risk Assessment (Dx Z13 89 Screen for Neurologic Disorder) 52IMZ1566 11:51AM User, Ahs     Test Name Result Flag Reference   Falls Risk      No falls in the past year       Health Management  Health Maintenance   COLONOSCOPY; every 10 years; Next Due: 25LKE9932; Overdue    Future Appointments    Date/Time Provider Specialty Site   10/02/2017 08:20 TARAS Sanon  Cardiology [facility]   01/11/2018 11:15 AM Imtiaz Sebastian DO Internal Medicine [facility] [place] [facility]\""   03/14/2018 09:15 AM TARAS Caballero   Dermatology [facility]     Signatures   Electronically signed by : Orion Boateng DO; Sep  8 2017  4:58PM EST                       (Author)

## 2017-10-26 NOTE — PROGRESS NOTES
Chief Complaint  CC: new patient, full body skin cancer exam, lesion on face  History of Present Illness  53-year-old male presents for overall checkup for concerns regarding growth on his right nasal wall that has been present for couple months and is not healing and referred for concerns regarding potential skin cancer      Assessment  Assessed    1  Changing skin lesion (709 9) (L98 9)   2  Seborrheic keratosis (702 19) (L82 1)   3  Screening for skin condition (V82 0) (Z13 89)    Active Problems  Problems    1  2-vessel coronary artery disease (414 00) (I25 10)   2  Abdominal pain, LLQ (left lower quadrant) (789 04) (R10 32)   3  Active advance directive (V49 89) (Z78 9)   4  Acute anterior circulation TIA (435 9) (G45 8)   5  Acute ST segment elevation MI (410 90) (I21 3)   6  Aortic valve disorder (424 1) (I35 9)   7  Backache (724 5) (M54 9)   8  Benign hypertension with chronic kidney disease (403 10,585 9) (I12 9)   9  Changing skin lesion (709 9) (L98 9)   10  Chest pain (786 50) (R07 9)   11  Coronary arteriosclerosis (414 00) (I25 10)   12  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)   13  Dyspnea on effort (786 09) (R06 09)   14  Encounter for monitoring antiplatelet therapy (I86 83,A77 08) (Z51 81,Z79 02)   15  Hematuria (599 70) (R31 9)   16  Hyperlipidemia (272 4) (E78 5)   17  Hypertension (401 9) (I10)   18  Limb pain (729 5) (M79 609)   19  No advance directive on file (V49 89) (Z78 9)   20  Obesity (278 00) (E66 9)   21  Proteinuria (791 0) (R80 9)   22  Renal insufficiency (593 9) (N28 9)   23  S/P cardiac cath (V45 89) (Z98 890)   24  Screening for genitourinary condition (V81 6) (Z13 89)   25  Stage III chronic kidney disease (585 3) (N18 3)   26  Type 2 diabetes mellitus (250 00) (E11 9)   27  Vitamin D deficiency (268 9) (E55 9)    Past Medical History  Problems    1  History of Benign prostatic hypertrophy with lower urinary tract symptoms (LUTS)   (600 01) (N40 1)   2   History of Colonoscopy (Fiberoptic)   3  History of Coronary arteriosclerosis (414 00) (I25 10)   4  History of aortic valve disorder (V12 59) (Z86 79)   5  History of hyperlipidemia (V12 29) (Z86 39)   6  History of hypertension (V12 59) (Z86 79)   7  History of shortness of breath (V13 89) (Z87 898)   8  History of transient cerebral ischemia (V12 54) (Z86 73)   9  History of Neuralgia (729 2) (M79 2)   10  History of Type 2 diabetes mellitus (250 00) (E11 9)    The past medical history was reviewed  Surgical History  Problems    1  History of Cardiac Cath Procedure Outcome: Successful   2  History of Cath Stent Placement   3  History of Coronary Artery Surgery   4  History of Hand Surgery   5  History of Hip Replacement    Surgical History reviewed      Family History  Mother    1  Family history of Family Health Status Of Mother -   Father    2  Family history of Chronic Obstructive Pulmonary Disease   3  Family history of Family Health Status Of Father -   Family History Reviewed- Derm:   Family History was reviewed      Social History  Problems    · Active advance directive (V49 89) (Z78 9)   · Denied: History of Alcohol Use (History)   · Never A Smoker   · No advance directive on file (V4 89) (Z78 9)   · Tobacco non-user (V4 89) (Z78 9)  The social history was reviewed      Current Meds   1  AmLODIPine Besylate 10 MG Oral Tablet; TAKE 1 TABLET BY MOUTH IN THE MORNING; Therapy: 91ZQM3830 to (Evaluate:28Rxy4595)  Requested for: 27VCT1687; Last   Rx:96Jgm1157 Ordered   2  Aspirin 81 MG Oral Tablet Delayed Release; Therapy: (Recorded:87Uzg9245) to Recorded   3  Clopidogrel Bisulfate 75 MG Oral Tablet; take one tablet by mouth one time daily; Therapy: 76UJH5015 to (MFWCXROQ:40VXG5475)  Requested for: 23RYS1741; Last   Rx:2017 Ordered   4  Doxazosin Mesylate 4 MG Oral Tablet; take one tablet by mouth one time daily;    Therapy: 64MJE6917 to (Evaluate:2018)  Requested for: 10FPJ3711; Last   Rx:15Tys9660 Ordered   5  GlyBURIDE 5 MG Oral Tablet; TAKE 2 TABLETS BY MOUTH IN THE MORNING AND TAKE   1 TABLET IN THE EVENING; Therapy: 19TSX8876 to (NTNNMVOU:71RIW5560)  Requested for: 2017; Last   Rx:2017 Ordered   6  Lisinopril 40 MG Oral Tablet; TAKE ONE TABLET BY MOUTH IN THE MORNING AND   ONE-HALF IN THE P M;   Therapy: 80Nzi0548 to (Evaluate:98Dui3357)  Requested for: 21Nqz3586; Last   Rx:2017 Ordered   7  Metoprolol Succinate ER 25 MG Oral Tablet Extended Release 24 Hour; take 1 tablet by   mouth twice a day; Therapy: 23SMZ3770 to (Harry Barakat)  Requested for: 2017; Last   Rx:2017 Ordered   8  OneTouch Ultra Blue In Vitro Strip; USE TO TEST BLOOD SUGAR TWICE DAILY; Therapy: 75UCH7842 to (Evaluate:19Sws1200)  Requested for: 01YBD4096; Last   Rx:31Lnp4343 Ordered   9  OneTouch Ultra Mini KIT; USE BID DAILY ; Therapy: (CCAMTHP43TVD2421) to Recorded   10  Oxybutynin Chloride 5 MG Oral Tablet; take one tablet by mouth one time daily; Therapy: 72Rrz3111 to (Evaluate:70Mke7578)  Requested for: 2017; Last    Rx:2017 Ordered    Review of Systems    Constitutional: Denies constitutional symptoms  Eyes: Denies eye symptoms  ENT:  denies ear symptoms, nasal symptoms, mouth or throat symptoms  Cardiovascular: Denies cardiovascular symptoms  Respiratory: Denies respiratory symptoms  Gastrointestinal: Denies gastrointestinal symptoms  Musculoskeletal: Denies musculoskeletal symptoms  Integumentary: Denies symptoms other than stated above  Neurological: Denies neurologic symptoms  Psychiatric: Denies psychiatric symptoms  Endocrine: Denies endocrine symptoms  Hematologic/Lymphatic: Denies hematologic symptoms  Allergies  Medication    1  CeleBREX CAPS   2  Dilaudid TABS   3  Statins    Physical Exam    Constitutional   General appearance: Appears healthy and well developed  Lymphatic   No visible disturbance  Musculoskeletal   Digits and nails: No clubbing, cyanosis or edema  Cutaneous and nail exam normal     Skin   Scalp skin texture and hair distribution: Normal skin texture on scalp, normal hair distribution  Head: Abnormal     Neck: Normal turgor, no rashes, no lesions  Chest: Normal turgor, no rashes, no lesions  Abdomen: Normal turgor, no rashes, no lesions  Back: Normal turgor, no rashes, no lesions  Right upper extremity: Normal turgor, no rashes, no lesions  Left upper extremity: Normal turgor, no rashes, no lesions  Right lower extremity: Normal turgor, no rashes, no lesions  Left lower extremity: Normal turgor, no rashes, no lesions  Neuro/Psych   Alert and oriented x 3  Displays comfort and cooperation during encounterl  Affect is normal     Finding 12mm pearly are with central ulcer R lateral nasal wall normal keratotic papules with greasy stuck on appearance nothing else atypical noted on exam       Procedure    Procedure: skin biopsy  Indications for the procedure include basal cell carcinoma suspected  Risks, benefits, alternatives, infection risk, bleeding risk, risk of allergic reaction and risk of scarring were discussed with the patient--   verbal consent was obtained prior to the procedure  Procedure Note:   Anesthesia: 1 ml of lidocaine 1% without epinephrine  The lesion was located on the Right nasal wall  The patient was prepped using alcohol  a shave biopsy of the lesion was taken  The hemostasis of the wound was achieved with aluminum chloride  Dressing: The wound was cleaned and petroleum jelly was applied and a sterile dressing was placed  Specimen: the excised lesion was place in buffered formalin and sent for pathology  Post-Procedure:   Patient Status: the patient tolerated the procedure well  Complications: there were no complications  Follow-up in the office  patient will be called in event skin cancer is found  -- Patient can call the office in 7-10 days for results if not contacted  Plan  Changing skin lesion    · Basal Cell Carcinoma education given today; Status:Complete;   Done: 47BAJ9187   · Wound care as instructed ; Status:Complete;   Done: 79KHO2738   · Teresa Mao MD, [facility]  (Dermatology) Co-Management  *  Status: Active  Requested for:  13SGA0177  Care Summary provided  : Yes  Screening for skin condition    · Use a sun block product with an SPF of 15 or more ; Status:Complete;   Done:  52MFI0261    Discussion/Summary    Assessment #1: Changing skin lesion  Care Plan:   Probable basal cell cell cancer poorly defined as margins refer for Mohs surgery pending result of biopsy  Assessment #2: Seborrheic keratosis  Care Plan:   Patient reassured these are normal growths we acquire with age no treatment needed  Assessment #3: Screening for dermatologic disorders  Care Plan:   Nothing else of concern noted on exam sun protection recommended we will set up a six-month follow-up  Future Appointments    Date/Time Provider Specialty Site   10/02/2017 08:20 TARAS Delgado  Cardiology [facility]   01/11/2018 11:15 AM Ronny Krause DO Internal Medicine [facility] [place]   03/14/2018 09:15 AM TARAS Wilkinson   Dermatology [facility]     Signatures   Electronically signed by : TARAS Cisneros ; Sep  8 2017 11:19AM EST                       (Author)

## 2017-10-27 ENCOUNTER — GENERIC CONVERSION - ENCOUNTER (OUTPATIENT)
Dept: OTHER | Facility: OTHER | Age: 82
End: 2017-10-27

## 2017-11-03 ENCOUNTER — GENERIC CONVERSION - ENCOUNTER (OUTPATIENT)
Dept: OTHER | Facility: OTHER | Age: 82
End: 2017-11-03

## 2017-11-06 ENCOUNTER — GENERIC CONVERSION - ENCOUNTER (OUTPATIENT)
Dept: OTHER | Facility: OTHER | Age: 82
End: 2017-11-06

## 2017-11-14 ENCOUNTER — ALLSCRIPTS OFFICE VISIT (OUTPATIENT)
Dept: OTHER | Facility: OTHER | Age: 82
End: 2017-11-14

## 2017-11-15 NOTE — PROGRESS NOTES
Assessment    1  Osteoarthritis of hip (715 95) (M16 9)  2  Preoperative clearance (V72 84) (Z01 818)    Discussion/Summary  Surgical Clearance: He is at a LOW TO MODERATE risk from a cardiovascular standpoint at this time without any additional cardiac testing  Reevaluation needed, if he should present with symptoms prior to surgery/procedure  Pre-op clearance- Pt received cardiovascular clearance from Dr Sidney Lo  has CKD 3 and follows with nephrology  His labs were reviewed  Most recent Creatinine 2 0  I spoke to Dr Slick Long, covering for Dr Kristan Milton, who gave verbal clearance for the pt to proceed with the planned surgery, however he recommends the surgeon request a nephrology consult perioperatively  is cleared for planned surgery  d/w Dr Melva Rodriguez who agrees with the A/P  Chief Complaint  pre-op clearance      History of Present Illness  Pre-Op Visit (Brief): The patient is being seen for a preoperative visit  Surgical Risk Assessment:  Prior Anesthesia: He had prior anesthesia,-- no prior adverse reaction to edidural anesthesia,-- no prior adverse reaction to spinal anesthesia-- and-- no prior adverse reaction to general anesthesia  Exercise Capacity: able to walk four blocks without symptoms-- and-- able to walk two flights of stairs without symptoms  Lifestyle Factors: denies alcohol use, denies tobacco use and denies illegal drug use  Symptoms: no easy bleeding,-- no easy bruising,-- no frequent nosebleeds,-- no chest pain,-- no cough,-- no dyspnea,-- no edema,-- no palpitations-- and-- no wheezing  HPI: Pt comes for pre-op clearance  He will have a right total hip replacement  He feels well and has no acute complaints   Osteoarthritis (Follow-Up): The patient states his osteoarthritis has been worse since the last visit  Osteoarthritis complications include hip arthroplasty  Symptoms: worsened hip pain        Review of Systems   Constitutional: no fever,-- not feeling poorly,-- no chills-- and-- not feeling tired  ENT: no complaints of earache, no hearing loss, no nosebleeds, no nasal discharge, no sore throat, no hoarseness  Cardiovascular: the heart rate was not slow,-- no chest pain,-- the heart rate was not fast-- and-- no palpitations  Respiratory: No complaints of shortness of breath, no wheezing, no cough, no SOB on exertion, no orthopnea or PND  Gastrointestinal: No complaints of abdominal pain, no constipation, no nausea or vomiting, no diarrhea or bloody stools  Musculoskeletal: arthralgias-- and-- joint stiffness, but-- as noted in HPI  ROS reviewed  Active Problems  1  2-vessel coronary artery disease (414 00) (I25 10)  2  Abdominal pain, LLQ (left lower quadrant) (789 04) (R10 32)  3  Active advance directive (V49 89) (Z78 9)  4  Acute anterior circulation TIA (435 9) (G45 8)  5  Acute ST segment elevation MI (410 90) (I21 3)  6  Aortic valve disorder (424 1) (I35 9)  7  Backache (724 5) (M54 9)  8  Benign hypertension with chronic kidney disease (403 10,585 9) (I12 9)  9  Changing skin lesion (709 9) (L98 9)  10  Chest pain (786 50) (R07 9)  11  Coronary arteriosclerosis (414 00) (I25 10)  12  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)  13  Dyspnea on effort (786 09) (R06 09)  14  Encounter for monitoring antiplatelet therapy (D60 40,Z95 10) (Z51 81,Z79 02)  15  Hematuria (599 70) (R31 9)  16  Hyperlipidemia (272 4) (E78 5)  17  Hypertension (401 9) (I10)  18  Limb pain (729 5) (M79 609)  19  No advance directive on file (V49 89) (Z78 9)  20  Obesity (278 00) (E66 9)  21  Proteinuria (791 0) (R80 9)  22  Renal insufficiency (593 9) (N28 9)  23  S/P cardiac cath (V45 89) (Z98 890)  24  Screening for genitourinary condition (V81 6) (Z13 89)  25  Screening for skin condition (V82 0) (Z13 89)  26  Seborrheic keratosis (702 19) (L82 1)  27  Stage III chronic kidney disease (585 3) (N18 3)  28  Type 2 diabetes mellitus (250 00) (E11 9)  29   Vitamin D deficiency (268 9) (E55 9)    Past Medical History     · History of Benign prostatic hypertrophy with lower urinary tract symptoms (LUTS)(600 01) (N40 1)   · History of Colonoscopy (Fiberoptic)   · History of Coronary arteriosclerosis (414 00) (I25 10)   · History of aortic valve disorder (V12 59) (Z86 79)   · History of hyperlipidemia (V12 29) (Z86 39)   · History of hypertension (V12 59) (Z86 79)   · History of shortness of breath (V13 89) (Z38 111)   · History of transient cerebral ischemia (V12 54) (Z86 73)   · History of Neuralgia (729 2) (M79 2)   · History of Type 2 diabetes mellitus (250 00) (E11 9)    The active problems and past medical history were reviewed and updated today  Surgical History   · History of Cardiac Cath Procedure Outcome: Successful   · History of Cath Stent Placement   · History of Coronary Artery Surgery   · History of Hand Surgery   · History of Hip Replacement    The surgical history was reviewed and updated today  Family History  Mother    · Family history of Family Health Status Of Mother -   Father    · Family history of Chronic Obstructive Pulmonary Disease   · Family history of Family Health Status Of Father -     The family history was reviewed and updated today  Social History     · Active advance directive (V49 89) (Z78 9)   · Denied: History of Alcohol Use (History)   · Never A Smoker   · No advance directive on file (V49 89) (Z78 9)   · Tobacco non-user (V49 89) (Z78 9)  The social history was reviewed and updated today  Current Meds  1  AmLODIPine Besylate 10 MG Oral Tablet; TAKE 1 TABLET BY MOUTH IN THE MORNING; Therapy: 83BRU8792 to (Evaluate:73Rua6172)  Requested for: 93RJM6939; Last Rx:2017 Ordered  2  Aspirin 81 MG Oral Tablet Delayed Release; Therapy: (Recorded:33Kuk2352) to Recorded  3  Clopidogrel Bisulfate 75 MG Oral Tablet; take one tablet by mouth one time daily;  Therapy: 58HPN3824 to (YPYNNTTL:25JJR6549)  Requested for: 85PVN4025; Last Rx:2017 Ordered  4  Doxazosin Mesylate 4 MG Oral Tablet; take one tablet by mouth one time daily; Therapy: 69IBD2545 to (Evaluate:23Scu0110)  Requested for: 23CPP8794; Last Rx:73Bjd2678 Ordered  5  GlyBURIDE 5 MG Oral Tablet; TAKE 2 TABLETS BY MOUTH IN THE MORNING AND TAKE 1 TABLET IN THE EVENING; Therapy: 02NLP2238 to (QEMNHBVL:76DQB3014)  Requested for: 64Zql5235; Last Rx:63Nmd0021 Ordered  6  Lisinopril 40 MG Oral Tablet; TAKE ONE TABLET BY MOUTH IN THE MORNING AND ONE-HALF IN THE P M; Therapy: 04Gpm3179 to (Evaluate:17Ffu1232)  Requested for: 21Eaj6453; Last Rx:26Gbf9453 Ordered  7  Metoprolol Succinate ER 25 MG Oral Tablet Extended Release 24 Hour; take 1 tablet by mouth twice a day; Therapy: 80KJA9689 to (Fledax Schwartz)  Requested for: 07Bzg0197; Last Rx:27Grg2130 Ordered  8  OneTouch Ultra Blue In Vitro Strip; USE TO TEST BLOOD SUGAR TWICE DAILY; Therapy: 00HHL3778 to (Evaluate:48Gwx1226)  Requested for: 16LZE7656; Last Rx:21Vjf0722 Ordered  9  OneTouch Ultra Mini KIT; USE BID DAILY ; Therapy: (CBGSSBU54NHX4540) to Recorded  10  Oxybutynin Chloride 5 MG Oral Tablet; take one tablet by mouth one time daily; Therapy: 95Bwl6100 to (Evaluate:79Vuv5932)  Requested for: 16Hfg8693; Last  Rx:95Trl7208 Ordered  11  TraMADol HCl TABS; Therapy: (Recorded:67Olc2435) to Recorded    The medication list was reviewed and updated today  Allergies  1  CeleBREX CAPS  2  Dilaudid TABS  3  Statins    Vitals   Recorded: 88LVJ0209 11:27AM   Temperature 98 2 F   Heart Rate 64   Systolic 169   Diastolic 78   Height 5 ft 10 in   Weight 219 lb    BMI Calculated 31 42   BSA Calculated 2 17       Physical Exam   Constitutional  General appearance: No acute distress, well appearing and well nourished  Eyes  Pupils and irises: Equal, round, reactive to light     Ears, Nose, Mouth, and Throat  External inspection of ears and nose: Normal    Otoscopic examination: Tympanic membranes translucent with normal light reflex  Canals patent without erythema  Hearing: Normal    Nasal mucosa, septum, and turbinates: Normal without edema or erythema  Oropharynx: Normal with no erythema, edema, exudate or lesions  Neck  Neck: Supple, symmetric, trachea midline, no masses  Pulmonary  Respiratory effort: No increased work of breathing or signs of respiratory distress  Auscultation of lungs: Clear to auscultation  Cardiovascular  Auscultation of heart: Normal rate and rhythm, normal S1 and S2, no murmurs  Pedal pulses: 2+ bilaterally  Examination of extremities for edema and/or varicosities: Normal    Abdomen  Abdomen: Non-tender, no masses  Liver and spleen: No hepatomegaly or splenomegaly  Lymphatic  Palpation of lymph nodes in neck: No lymphadenopathy  Musculoskeletal  Gait and station: Abnormal  -- using a crutch  Psychiatric  Judgment and insight: Normal    Orientation to person, place and time: Normal    Mood and affect: Normal        End of Encounter Meds    1  Clopidogrel Bisulfate 75 MG Oral Tablet (Plavix); take one tablet by mouth one time daily; Therapy: 53GPB7125 to (UPZACLOW:50YAC7895)  Requested for: 92PIN8045; Last Rx:09Jan2017 Ordered    2  Oxybutynin Chloride 5 MG Oral Tablet; take one tablet by mouth one time daily; Therapy: 92Zud6978 to (Evaluate:52Tzn0636)  Requested for: 28Cdz7510; Last Rx:50Suu4950 Ordered    3  AmLODIPine Besylate 10 MG Oral Tablet (Norvasc); TAKE 1 TABLET BY MOUTH IN THE MORNING; Therapy: 05IHE9977 to (Evaluate:96Jqp7239)  Requested for: 70LAE1834; Last Rx:49Xxg9404 Ordered  4  Doxazosin Mesylate 4 MG Oral Tablet (Cardura); take one tablet by mouth one time daily; Therapy: 90WZP2832 to (Evaluate:83Xoi6002)  Requested for: 64ACV1982; Last Rx:29Ngt1487 Ordered    5  Lisinopril 40 MG Oral Tablet; TAKE ONE TABLET BY MOUTH IN THE MORNING AND ONE-HALF IN THE P M; Therapy: 70Anm9224 to (Evaluate:24Qog5228)  Requested for: 81Yiw9883; Last Rx:35Cvr3340 Ordered  6   Metoprolol Succinate ER 25 MG Oral Tablet Extended Release 24 Hour; take 1 tablet by mouth twice a day; Therapy: 56TSN1126 to (Suzette Girmes)  Requested for: 07Sep2017; Last Rx:07Sep2017 Ordered    7  GlyBURIDE 5 MG Oral Tablet; TAKE 2 TABLETS BY MOUTH IN THE MORNING AND TAKE 1 TABLET IN THE EVENING; Therapy: 88LKW4367 to (OVPWRHDJ:11HDP8889)  Requested for: 07Sep2017; Last Rx:07Sep2017 Ordered  8  OneTouch Ultra Blue In Vitro Strip; USE TO TEST BLOOD SUGAR TWICE DAILY; Therapy: 02AWW5243 to (Evaluate:22Crq9693)  Requested for: 02BZE8174; Last Rx:59Buv2532 Ordered    9  Aspirin 81 MG Oral Tablet Delayed Release; Therapy: (Recorded:40Zxq0788) to Recorded  10  OneTouch Ultra Mini KIT; USE BID DAILY ; Therapy: (SOTDOUYH:95URC8952) to Recorded  11  TraMADol HCl TABS; Therapy: (Recorded:16Hhn3406) to Recorded    Future Appointments    Date/Time Provider Specialty Site   01/11/2018 11:15 AM Isabelle Serrato DO [facility] [id number] [facility]   03/14/2018 09:15 AM TARAS Rojas   Dermatology [facility]       Signatures   Electronically signed by : Patricia Garrido [facility]; Nov 14 2017  4:12PM EST                       (Author)    Electronically signed by : Lilia Lopez DO; Nov 14 2017  5:48PM EST                       (Author)

## 2017-11-22 ENCOUNTER — HOSPITAL ENCOUNTER (OUTPATIENT)
Facility: HOSPITAL | Age: 82
Discharge: STILL ADMITTED OR ADMINISTRATIVE | End: 2017-11-24
Attending: PHYSICAL MEDICINE & REHABILITATION | Admitting: PHYSICAL MEDICINE & REHABILITATION

## 2017-11-23 LAB
ALBUMIN SERPL BCP-MCNC: 2.4 G/DL (ref 3.5–5)
ALP SERPL-CCNC: 40 U/L (ref 46–116)
ALT SERPL W P-5'-P-CCNC: 14 U/L (ref 12–78)
ANION GAP SERPL CALCULATED.3IONS-SCNC: 7 MMOL/L (ref 4–13)
AST SERPL W P-5'-P-CCNC: 30 U/L (ref 5–45)
BACTERIA UR QL AUTO: ABNORMAL /HPF
BASOPHILS # BLD AUTO: 0.02 THOUSANDS/ΜL (ref 0–0.1)
BASOPHILS NFR BLD AUTO: 0 % (ref 0–1)
BILIRUB SERPL-MCNC: 0.4 MG/DL (ref 0.2–1)
BILIRUB UR QL STRIP: NEGATIVE
BUN SERPL-MCNC: 28 MG/DL (ref 5–25)
CALCIUM SERPL-MCNC: 8.9 MG/DL (ref 8.3–10.1)
CHLORIDE SERPL-SCNC: 104 MMOL/L (ref 100–108)
CLARITY UR: CLEAR
CO2 SERPL-SCNC: 25 MMOL/L (ref 21–32)
COLOR UR: YELLOW
CREAT SERPL-MCNC: 1.47 MG/DL (ref 0.6–1.3)
EOSINOPHIL # BLD AUTO: 0.11 THOUSAND/ΜL (ref 0–0.61)
EOSINOPHIL NFR BLD AUTO: 1 % (ref 0–6)
ERYTHROCYTE [DISTWIDTH] IN BLOOD BY AUTOMATED COUNT: 12.5 % (ref 11.6–15.1)
GFR SERPL CREATININE-BSD FRML MDRD: 45 ML/MIN/1.73SQ M
GLUCOSE SERPL-MCNC: 152 MG/DL (ref 65–140)
GLUCOSE UR STRIP-MCNC: NEGATIVE MG/DL
HCT VFR BLD AUTO: 25.7 % (ref 36.5–49.3)
HGB BLD-MCNC: 8.6 G/DL (ref 12–17)
HGB UR QL STRIP.AUTO: ABNORMAL
KETONES UR STRIP-MCNC: NEGATIVE MG/DL
LEUKOCYTE ESTERASE UR QL STRIP: NEGATIVE
LYMPHOCYTES # BLD AUTO: 1.07 THOUSANDS/ΜL (ref 0.6–4.47)
LYMPHOCYTES NFR BLD AUTO: 13 % (ref 14–44)
MCH RBC QN AUTO: 30.5 PG (ref 26.8–34.3)
MCHC RBC AUTO-ENTMCNC: 33.5 G/DL (ref 31.4–37.4)
MCV RBC AUTO: 91 FL (ref 82–98)
MONOCYTES # BLD AUTO: 0.66 THOUSAND/ΜL (ref 0.17–1.22)
MONOCYTES NFR BLD AUTO: 8 % (ref 4–12)
NEUTROPHILS # BLD AUTO: 6.54 THOUSANDS/ΜL (ref 1.85–7.62)
NEUTS SEG NFR BLD AUTO: 77 % (ref 43–75)
NITRITE UR QL STRIP: NEGATIVE
NON-SQ EPI CELLS URNS QL MICRO: ABNORMAL /HPF
NRBC BLD AUTO-RTO: 0 /100 WBCS
PH UR STRIP.AUTO: 6 [PH] (ref 4.5–8)
PLATELET # BLD AUTO: 129 THOUSANDS/UL (ref 149–390)
PMV BLD AUTO: 10.2 FL (ref 8.9–12.7)
POTASSIUM SERPL-SCNC: 4 MMOL/L (ref 3.5–5.3)
PREALB SERPL-MCNC: 12.7 MG/DL (ref 18–40)
PROT SERPL-MCNC: 6.2 G/DL (ref 6.4–8.2)
PROT UR STRIP-MCNC: ABNORMAL MG/DL
RBC # BLD AUTO: 2.82 MILLION/UL (ref 3.88–5.62)
RBC #/AREA URNS AUTO: ABNORMAL /HPF
SODIUM SERPL-SCNC: 136 MMOL/L (ref 136–145)
SP GR UR STRIP.AUTO: 1.02 (ref 1–1.03)
UROBILINOGEN UR QL STRIP.AUTO: 0.2 E.U./DL
WBC # BLD AUTO: 8.45 THOUSAND/UL (ref 4.31–10.16)
WBC #/AREA URNS AUTO: ABNORMAL /HPF

## 2017-11-23 PROCEDURE — 80053 COMPREHEN METABOLIC PANEL: CPT | Performed by: PHYSICAL MEDICINE & REHABILITATION

## 2017-11-23 PROCEDURE — 81001 URINALYSIS AUTO W/SCOPE: CPT | Performed by: PHYSICAL MEDICINE & REHABILITATION

## 2017-11-23 PROCEDURE — 85025 COMPLETE CBC W/AUTO DIFF WBC: CPT | Performed by: PHYSICAL MEDICINE & REHABILITATION

## 2017-11-23 PROCEDURE — 84134 ASSAY OF PREALBUMIN: CPT | Performed by: PHYSICAL MEDICINE & REHABILITATION

## 2017-11-24 ENCOUNTER — APPOINTMENT (EMERGENCY)
Dept: RADIOLOGY | Facility: HOSPITAL | Age: 82
DRG: 062 | End: 2017-11-24
Payer: MEDICARE

## 2017-11-24 ENCOUNTER — APPOINTMENT (INPATIENT)
Dept: ULTRASOUND IMAGING | Facility: HOSPITAL | Age: 82
DRG: 062 | End: 2017-11-24
Payer: MEDICARE

## 2017-11-24 ENCOUNTER — APPOINTMENT (INPATIENT)
Dept: CT IMAGING | Facility: HOSPITAL | Age: 82
DRG: 062 | End: 2017-11-24
Payer: MEDICARE

## 2017-11-24 ENCOUNTER — APPOINTMENT (EMERGENCY)
Dept: CT IMAGING | Facility: HOSPITAL | Age: 82
DRG: 062 | End: 2017-11-24
Payer: MEDICARE

## 2017-11-24 ENCOUNTER — HOSPITAL ENCOUNTER (INPATIENT)
Facility: HOSPITAL | Age: 82
LOS: 11 days | DRG: 553 | End: 2017-12-05
Attending: ANESTHESIOLOGY | Admitting: ANESTHESIOLOGY
Payer: MEDICARE

## 2017-11-24 ENCOUNTER — HOSPITAL ENCOUNTER (INPATIENT)
Facility: HOSPITAL | Age: 82
LOS: 1 days | DRG: 062 | End: 2017-11-24
Attending: ANESTHESIOLOGY | Admitting: ANESTHESIOLOGY
Payer: MEDICARE

## 2017-11-24 ENCOUNTER — APPOINTMENT (EMERGENCY)
Dept: ULTRASOUND IMAGING | Facility: HOSPITAL | Age: 82
DRG: 062 | End: 2017-11-24
Payer: MEDICARE

## 2017-11-24 ENCOUNTER — APPOINTMENT (INPATIENT)
Dept: RADIOLOGY | Facility: HOSPITAL | Age: 82
DRG: 553 | End: 2017-11-24
Payer: MEDICARE

## 2017-11-24 ENCOUNTER — APPOINTMENT (INPATIENT)
Dept: RADIOLOGY | Facility: HOSPITAL | Age: 82
DRG: 062 | End: 2017-11-24
Payer: MEDICARE

## 2017-11-24 ENCOUNTER — APPOINTMENT (INPATIENT)
Dept: NON INVASIVE DIAGNOSTICS | Facility: HOSPITAL | Age: 82
DRG: 062 | End: 2017-11-24
Payer: MEDICARE

## 2017-11-24 VITALS
TEMPERATURE: 97 F | DIASTOLIC BLOOD PRESSURE: 90 MMHG | HEART RATE: 90 BPM | BODY MASS INDEX: 31.39 KG/M2 | OXYGEN SATURATION: 100 % | SYSTOLIC BLOOD PRESSURE: 194 MMHG | RESPIRATION RATE: 34 BRPM | HEIGHT: 71 IN | WEIGHT: 224.21 LBS

## 2017-11-24 DIAGNOSIS — R53.1 LEFT-SIDED WEAKNESS: Primary | ICD-10-CM

## 2017-11-24 DIAGNOSIS — I63.9 STROKE (HCC): Primary | ICD-10-CM

## 2017-11-24 DIAGNOSIS — M25.551 RIGHT HIP PAIN: ICD-10-CM

## 2017-11-24 DIAGNOSIS — R53.1 LEFT-SIDED WEAKNESS: ICD-10-CM

## 2017-11-24 DIAGNOSIS — I25.10 CAD (CORONARY ARTERY DISEASE): ICD-10-CM

## 2017-11-24 DIAGNOSIS — S72.001S HIP FX, RIGHT, SEQUELA: ICD-10-CM

## 2017-11-24 DIAGNOSIS — S80.10XA HEMATOMA OF LEG: ICD-10-CM

## 2017-11-24 DIAGNOSIS — D64.9 POSTOPERATIVE ANEMIA: ICD-10-CM

## 2017-11-24 DIAGNOSIS — S80.10XA: ICD-10-CM

## 2017-11-24 DIAGNOSIS — R94.31 ABNORMAL EKG: ICD-10-CM

## 2017-11-24 PROBLEM — D68.9 COAGULOPATHY (HCC): Status: ACTIVE | Noted: 2017-11-24

## 2017-11-24 PROBLEM — I10 HTN (HYPERTENSION): Status: ACTIVE | Noted: 2017-11-24

## 2017-11-24 PROBLEM — I82.622 ACUTE DEEP VEIN THROMBOSIS (DVT) OF BRACHIAL VEIN OF LEFT UPPER EXTREMITY (HCC): Status: ACTIVE | Noted: 2017-11-24

## 2017-11-24 PROBLEM — E11.9 DM2 (DIABETES MELLITUS, TYPE 2) (HCC): Status: ACTIVE | Noted: 2017-11-24

## 2017-11-24 PROBLEM — E78.5 HLD (HYPERLIPIDEMIA): Status: ACTIVE | Noted: 2017-11-24

## 2017-11-24 LAB
ABO GROUP BLD: NORMAL
ABO GROUP BLD: NORMAL
ANION GAP BLD CALC-SCNC: 18 MMOL/L (ref 4–13)
ANION GAP SERPL CALCULATED.3IONS-SCNC: 6 MMOL/L (ref 4–13)
APTT PPP: 31 SECONDS (ref 23–35)
APTT PPP: 44 SECONDS (ref 23–35)
BLD GP AB SCN SERPL QL: NEGATIVE
BLD GP AB SCN SERPL QL: NEGATIVE
BUN BLD-MCNC: 27 MG/DL (ref 5–25)
BUN SERPL-MCNC: 29 MG/DL (ref 5–25)
CA-I BLD-SCNC: 1.18 MMOL/L (ref 1.12–1.32)
CALCIUM SERPL-MCNC: 8.8 MG/DL (ref 8.3–10.1)
CHLORIDE BLD-SCNC: 97 MMOL/L (ref 100–108)
CHLORIDE SERPL-SCNC: 100 MMOL/L (ref 100–108)
CO2 SERPL-SCNC: 28 MMOL/L (ref 21–32)
CREAT BLD-MCNC: 1.5 MG/DL (ref 0.6–1.3)
CREAT SERPL-MCNC: 1.52 MG/DL (ref 0.6–1.3)
ERYTHROCYTE [DISTWIDTH] IN BLOOD BY AUTOMATED COUNT: 12.7 % (ref 11.6–15.1)
ERYTHROCYTE [DISTWIDTH] IN BLOOD BY AUTOMATED COUNT: 12.9 % (ref 11.6–15.1)
ERYTHROCYTE [DISTWIDTH] IN BLOOD BY AUTOMATED COUNT: 14.2 % (ref 11.6–15.1)
FIBRINOGEN PPP-MCNC: 538 MG/DL (ref 227–495)
GFR SERPL CREATININE-BSD FRML MDRD: 43 ML/MIN/1.73SQ M
GFR SERPL CREATININE-BSD FRML MDRD: 44 ML/MIN/1.73SQ M
GLUCOSE SERPL-MCNC: 171 MG/DL (ref 65–140)
GLUCOSE SERPL-MCNC: 176 MG/DL (ref 65–140)
GLUCOSE SERPL-MCNC: 193 MG/DL (ref 65–140)
GLUCOSE SERPL-MCNC: 235 MG/DL (ref 65–140)
HCT VFR BLD AUTO: 24.3 % (ref 36.5–49.3)
HCT VFR BLD AUTO: 25.2 % (ref 36.5–49.3)
HCT VFR BLD AUTO: 25.3 % (ref 36.5–49.3)
HCT VFR BLD AUTO: 28.1 % (ref 36.5–49.3)
HCT VFR BLD AUTO: 31.6 % (ref 36.5–49.3)
HCT VFR BLD CALC: 24 % (ref 36.5–49.3)
HGB BLD-MCNC: 10 G/DL (ref 12–17)
HGB BLD-MCNC: 10.9 G/DL (ref 12–17)
HGB BLD-MCNC: 8.3 G/DL (ref 12–17)
HGB BLD-MCNC: 8.4 G/DL (ref 12–17)
HGB BLD-MCNC: 8.5 G/DL (ref 12–17)
HGB BLDA-MCNC: 8.2 G/DL (ref 12–17)
INR PPP: 1.07 (ref 0.86–1.16)
INR PPP: 1.09 (ref 0.86–1.16)
MCH RBC QN AUTO: 29.7 PG (ref 26.8–34.3)
MCH RBC QN AUTO: 30.4 PG (ref 26.8–34.3)
MCH RBC QN AUTO: 31 PG (ref 26.8–34.3)
MCHC RBC AUTO-ENTMCNC: 33.3 G/DL (ref 31.4–37.4)
MCHC RBC AUTO-ENTMCNC: 34.2 G/DL (ref 31.4–37.4)
MCHC RBC AUTO-ENTMCNC: 34.5 G/DL (ref 31.4–37.4)
MCV RBC AUTO: 86 FL (ref 82–98)
MCV RBC AUTO: 91 FL (ref 82–98)
MCV RBC AUTO: 91 FL (ref 82–98)
PCO2 BLD: 24 MMOL/L (ref 21–32)
PLATELET # BLD AUTO: 123 THOUSANDS/UL (ref 149–390)
PLATELET # BLD AUTO: 136 THOUSANDS/UL (ref 149–390)
PLATELET # BLD AUTO: 148 THOUSANDS/UL (ref 149–390)
PMV BLD AUTO: 10.9 FL (ref 8.9–12.7)
PMV BLD AUTO: 9.7 FL (ref 8.9–12.7)
PMV BLD AUTO: 9.8 FL (ref 8.9–12.7)
POTASSIUM BLD-SCNC: 4.4 MMOL/L (ref 3.5–5.3)
POTASSIUM SERPL-SCNC: 4.4 MMOL/L (ref 3.5–5.3)
PROTHROMBIN TIME: 14.1 SECONDS (ref 12.1–14.4)
PROTHROMBIN TIME: 14.3 SECONDS (ref 12.1–14.4)
RBC # BLD AUTO: 2.68 MILLION/UL (ref 3.88–5.62)
RBC # BLD AUTO: 2.76 MILLION/UL (ref 3.88–5.62)
RBC # BLD AUTO: 3.67 MILLION/UL (ref 3.88–5.62)
RH BLD: POSITIVE
RH BLD: POSITIVE
SODIUM BLD-SCNC: 134 MMOL/L (ref 136–145)
SODIUM SERPL-SCNC: 134 MMOL/L (ref 136–145)
SPECIMEN EXPIRATION DATE: NORMAL
SPECIMEN EXPIRATION DATE: NORMAL
SPECIMEN SOURCE: ABNORMAL
SPECIMEN SOURCE: ABNORMAL
TROPONIN I BLD-MCNC: 0.1 NG/ML (ref 0–0.08)
TROPONIN I SERPL-MCNC: 0.03 NG/ML
TROPONIN I SERPL-MCNC: 0.04 NG/ML
TROPONIN I SERPL-MCNC: 0.05 NG/ML
WBC # BLD AUTO: 11.26 THOUSAND/UL (ref 4.31–10.16)
WBC # BLD AUTO: 12.02 THOUSAND/UL (ref 4.31–10.16)
WBC # BLD AUTO: 7.84 THOUSAND/UL (ref 4.31–10.16)

## 2017-11-24 PROCEDURE — 30233M1 TRANSFUSION OF NONAUTOLOGOUS PLASMA CRYOPRECIPITATE INTO PERIPHERAL VEIN, PERCUTANEOUS APPROACH: ICD-10-PCS | Performed by: ANESTHESIOLOGY

## 2017-11-24 PROCEDURE — 93005 ELECTROCARDIOGRAM TRACING: CPT

## 2017-11-24 PROCEDURE — 84484 ASSAY OF TROPONIN QUANT: CPT

## 2017-11-24 PROCEDURE — 82948 REAGENT STRIP/BLOOD GLUCOSE: CPT

## 2017-11-24 PROCEDURE — 85018 HEMOGLOBIN: CPT | Performed by: NURSE PRACTITIONER

## 2017-11-24 PROCEDURE — 84484 ASSAY OF TROPONIN QUANT: CPT | Performed by: NURSE PRACTITIONER

## 2017-11-24 PROCEDURE — 85018 HEMOGLOBIN: CPT | Performed by: INTERNAL MEDICINE

## 2017-11-24 PROCEDURE — 86850 RBC ANTIBODY SCREEN: CPT | Performed by: INTERNAL MEDICINE

## 2017-11-24 PROCEDURE — P9012 CRYOPRECIPITATE EACH UNIT: HCPCS

## 2017-11-24 PROCEDURE — 85027 COMPLETE CBC AUTOMATED: CPT | Performed by: NURSE PRACTITIONER

## 2017-11-24 PROCEDURE — 99292 CRITICAL CARE ADDL 30 MIN: CPT

## 2017-11-24 PROCEDURE — 85014 HEMATOCRIT: CPT | Performed by: INTERNAL MEDICINE

## 2017-11-24 PROCEDURE — 99291 CRITICAL CARE FIRST HOUR: CPT

## 2017-11-24 PROCEDURE — 30233N1 TRANSFUSION OF NONAUTOLOGOUS RED BLOOD CELLS INTO PERIPHERAL VEIN, PERCUTANEOUS APPROACH: ICD-10-PCS | Performed by: EMERGENCY MEDICINE

## 2017-11-24 PROCEDURE — 85730 THROMBOPLASTIN TIME PARTIAL: CPT | Performed by: PHYSICIAN ASSISTANT

## 2017-11-24 PROCEDURE — P9021 RED BLOOD CELLS UNIT: HCPCS

## 2017-11-24 PROCEDURE — 85730 THROMBOPLASTIN TIME PARTIAL: CPT | Performed by: NURSE PRACTITIONER

## 2017-11-24 PROCEDURE — 86920 COMPATIBILITY TEST SPIN: CPT

## 2017-11-24 PROCEDURE — 74176 CT ABD & PELVIS W/O CONTRAST: CPT

## 2017-11-24 PROCEDURE — 70450 CT HEAD/BRAIN W/O DYE: CPT

## 2017-11-24 PROCEDURE — 86900 BLOOD TYPING SEROLOGIC ABO: CPT | Performed by: PHYSICIAN ASSISTANT

## 2017-11-24 PROCEDURE — 70498 CT ANGIOGRAPHY NECK: CPT

## 2017-11-24 PROCEDURE — 96365 THER/PROPH/DIAG IV INF INIT: CPT

## 2017-11-24 PROCEDURE — 93971 EXTREMITY STUDY: CPT

## 2017-11-24 PROCEDURE — 93005 ELECTROCARDIOGRAM TRACING: CPT | Performed by: PHYSICIAN ASSISTANT

## 2017-11-24 PROCEDURE — 84484 ASSAY OF TROPONIN QUANT: CPT | Performed by: PHYSICIAN ASSISTANT

## 2017-11-24 PROCEDURE — 87205 SMEAR GRAM STAIN: CPT | Performed by: NURSE PRACTITIONER

## 2017-11-24 PROCEDURE — 3E03317 INTRODUCTION OF OTHER THROMBOLYTIC INTO PERIPHERAL VEIN, PERCUTANEOUS APPROACH: ICD-10-PCS | Performed by: EMERGENCY MEDICINE

## 2017-11-24 PROCEDURE — 71250 CT THORAX DX C-: CPT

## 2017-11-24 PROCEDURE — 86901 BLOOD TYPING SEROLOGIC RH(D): CPT | Performed by: INTERNAL MEDICINE

## 2017-11-24 PROCEDURE — 86901 BLOOD TYPING SEROLOGIC RH(D): CPT | Performed by: PHYSICIAN ASSISTANT

## 2017-11-24 PROCEDURE — 85384 FIBRINOGEN ACTIVITY: CPT | Performed by: NURSE PRACTITIONER

## 2017-11-24 PROCEDURE — 85610 PROTHROMBIN TIME: CPT | Performed by: NURSE PRACTITIONER

## 2017-11-24 PROCEDURE — 86850 RBC ANTIBODY SCREEN: CPT | Performed by: PHYSICIAN ASSISTANT

## 2017-11-24 PROCEDURE — 85014 HEMATOCRIT: CPT

## 2017-11-24 PROCEDURE — 85014 HEMATOCRIT: CPT | Performed by: NURSE PRACTITIONER

## 2017-11-24 PROCEDURE — 80048 BASIC METABOLIC PNL TOTAL CA: CPT | Performed by: PHYSICIAN ASSISTANT

## 2017-11-24 PROCEDURE — 76882 US LMTD JT/FCL EVL NVASC XTR: CPT

## 2017-11-24 PROCEDURE — 73502 X-RAY EXAM HIP UNI 2-3 VIEWS: CPT

## 2017-11-24 PROCEDURE — 86900 BLOOD TYPING SEROLOGIC ABO: CPT | Performed by: INTERNAL MEDICINE

## 2017-11-24 PROCEDURE — 70496 CT ANGIOGRAPHY HEAD: CPT

## 2017-11-24 PROCEDURE — 36415 COLL VENOUS BLD VENIPUNCTURE: CPT | Performed by: PHYSICIAN ASSISTANT

## 2017-11-24 PROCEDURE — 80047 BASIC METABLC PNL IONIZED CA: CPT

## 2017-11-24 PROCEDURE — 87070 CULTURE OTHR SPECIMN AEROBIC: CPT | Performed by: NURSE PRACTITIONER

## 2017-11-24 PROCEDURE — 71010 HB CHEST X-RAY 1 VIEW FRONTAL (PORTABLE): CPT

## 2017-11-24 PROCEDURE — 85027 COMPLETE CBC AUTOMATED: CPT | Performed by: PHYSICIAN ASSISTANT

## 2017-11-24 PROCEDURE — 85610 PROTHROMBIN TIME: CPT | Performed by: PHYSICIAN ASSISTANT

## 2017-11-24 RX ORDER — SODIUM CHLORIDE 9 MG/ML
75 INJECTION, SOLUTION INTRAVENOUS ONCE
Status: COMPLETED | OUTPATIENT
Start: 2017-11-24 | End: 2017-11-24

## 2017-11-24 RX ORDER — ALBUMIN, HUMAN INJ 5% 5 %
25 SOLUTION INTRAVENOUS ONCE
Status: COMPLETED | OUTPATIENT
Start: 2017-11-24 | End: 2017-11-24

## 2017-11-24 RX ORDER — POLYETHYLENE GLYCOL 3350 17 G/17G
17 POWDER, FOR SOLUTION ORAL DAILY
Status: ON HOLD | COMMUNITY
End: 2017-12-18 | Stop reason: CLARIF

## 2017-11-24 RX ORDER — LISINOPRIL 40 MG/1
1 TABLET ORAL DAILY
Status: ON HOLD | COMMUNITY
Start: 2016-04-04 | End: 2017-12-18 | Stop reason: CLARIF

## 2017-11-24 RX ORDER — SENNA PLUS 8.6 MG/1
1 TABLET ORAL DAILY PRN
Status: ON HOLD | COMMUNITY
End: 2017-12-18 | Stop reason: CLARIF

## 2017-11-24 RX ORDER — FENTANYL CITRATE 50 UG/ML
25 INJECTION, SOLUTION INTRAMUSCULAR; INTRAVENOUS EVERY 2 HOUR PRN
Status: DISCONTINUED | OUTPATIENT
Start: 2017-11-24 | End: 2017-11-26

## 2017-11-24 RX ORDER — ACETAMINOPHEN 325 MG/1
650 TABLET ORAL EVERY 4 HOURS PRN
Status: ON HOLD | COMMUNITY
End: 2017-12-18 | Stop reason: CLARIF

## 2017-11-24 RX ORDER — METOPROLOL TARTRATE 5 MG/5ML
5 INJECTION INTRAVENOUS EVERY 6 HOURS PRN
Status: DISCONTINUED | OUTPATIENT
Start: 2017-11-24 | End: 2017-12-05 | Stop reason: HOSPADM

## 2017-11-24 RX ORDER — METOPROLOL TARTRATE 5 MG/5ML
5 INJECTION INTRAVENOUS EVERY 6 HOURS PRN
Status: CANCELLED | OUTPATIENT
Start: 2017-11-24

## 2017-11-24 RX ORDER — DOCUSATE SODIUM 100 MG/1
100 CAPSULE, LIQUID FILLED ORAL DAILY PRN
Status: ON HOLD | COMMUNITY
End: 2017-12-18 | Stop reason: CLARIF

## 2017-11-24 RX ORDER — ALBUMIN, HUMAN INJ 5% 5 %
SOLUTION INTRAVENOUS
Status: COMPLETED
Start: 2017-11-24 | End: 2017-11-24

## 2017-11-24 RX ORDER — OXYCODONE HYDROCHLORIDE 5 MG/1
5 TABLET ORAL EVERY 4 HOURS PRN
Status: ON HOLD | COMMUNITY
End: 2017-12-18 | Stop reason: CLARIF

## 2017-11-24 RX ORDER — OXYBUTYNIN CHLORIDE 5 MG/1
1 TABLET ORAL DAILY
Status: ON HOLD | COMMUNITY
Start: 2016-07-27 | End: 2017-12-18 | Stop reason: CLARIF

## 2017-11-24 RX ORDER — GLYBURIDE 5 MG/1
1 TABLET ORAL
Status: ON HOLD | COMMUNITY
Start: 2016-12-19 | End: 2017-12-18 | Stop reason: CLARIF

## 2017-11-24 RX ORDER — AMLODIPINE BESYLATE 10 MG/1
1 TABLET ORAL DAILY
Status: ON HOLD | COMMUNITY
Start: 2017-05-30 | End: 2017-12-18 | Stop reason: CLARIF

## 2017-11-24 RX ORDER — DOXAZOSIN MESYLATE 4 MG/1
1 TABLET ORAL DAILY
Status: ON HOLD | COMMUNITY
Start: 2015-07-06 | End: 2017-12-18 | Stop reason: CLARIF

## 2017-11-24 RX ORDER — METOPROLOL SUCCINATE 25 MG/1
1 TABLET, EXTENDED RELEASE ORAL EVERY 12 HOURS
Status: ON HOLD | COMMUNITY
Start: 2015-06-01 | End: 2017-12-18 | Stop reason: CLARIF

## 2017-11-24 RX ORDER — METOPROLOL TARTRATE 5 MG/5ML
5 INJECTION INTRAVENOUS EVERY 6 HOURS PRN
Status: DISCONTINUED | OUTPATIENT
Start: 2017-11-24 | End: 2017-11-24 | Stop reason: HOSPADM

## 2017-11-24 RX ORDER — METOPROLOL TARTRATE 5 MG/5ML
5 INJECTION INTRAVENOUS EVERY 6 HOURS
Status: DISCONTINUED | OUTPATIENT
Start: 2017-11-24 | End: 2017-11-24

## 2017-11-24 RX ORDER — ONDANSETRON 2 MG/ML
4 INJECTION INTRAMUSCULAR; INTRAVENOUS EVERY 4 HOURS PRN
Status: DISCONTINUED | OUTPATIENT
Start: 2017-11-24 | End: 2017-12-05 | Stop reason: HOSPADM

## 2017-11-24 RX ORDER — OXYCODONE HYDROCHLORIDE 5 MG/1
2.5 TABLET ORAL EVERY 4 HOURS PRN
Status: DISCONTINUED | OUTPATIENT
Start: 2017-11-24 | End: 2017-11-25

## 2017-11-24 RX ORDER — CLOPIDOGREL BISULFATE 75 MG/1
1 TABLET ORAL DAILY
Status: ON HOLD | COMMUNITY
Start: 2017-01-09 | End: 2017-12-18 | Stop reason: CLARIF

## 2017-11-24 RX ORDER — ACETAMINOPHEN 325 MG/1
650 TABLET ORAL EVERY 6 HOURS PRN
Status: DISCONTINUED | OUTPATIENT
Start: 2017-11-24 | End: 2017-11-27

## 2017-11-24 RX ORDER — LIDOCAINE 50 MG/G
1 PATCH TOPICAL DAILY
Status: COMPLETED | OUTPATIENT
Start: 2017-11-25 | End: 2017-11-25

## 2017-11-24 RX ADMIN — METOROPROLOL TARTRATE 5 MG: 5 INJECTION, SOLUTION INTRAVENOUS at 18:04

## 2017-11-24 RX ADMIN — FENTANYL CITRATE 25 MCG: 50 INJECTION, SOLUTION INTRAMUSCULAR; INTRAVENOUS at 19:51

## 2017-11-24 RX ADMIN — INSULIN LISPRO 2 UNITS: 100 INJECTION, SOLUTION INTRAVENOUS; SUBCUTANEOUS at 19:29

## 2017-11-24 RX ADMIN — SODIUM CHLORIDE 75 ML/HR: 0.9 INJECTION, SOLUTION INTRAVENOUS at 12:34

## 2017-11-24 RX ADMIN — ALBUMIN HUMAN 25 G: 0.05 INJECTION, SOLUTION INTRAVENOUS at 16:10

## 2017-11-24 RX ADMIN — ALTEPLASE 81 MG: KIT at 12:33

## 2017-11-24 RX ADMIN — IOHEXOL 85 ML: 350 INJECTION, SOLUTION INTRAVENOUS at 10:18

## 2017-11-24 RX ADMIN — ALBUMIN, HUMAN INJ 5% 25 G: 5 SOLUTION at 16:10

## 2017-11-24 NOTE — PROGRESS NOTES
Ortho at bedside to evaluate patient  Dr DE GUZMAN  [facility] @ bedside to evaluate  Patient turned and bathed  Labs sent along with type and screen for SLB  One unit of PRBC sent to blood bank from [facility]  Orders as noted  BS was +200  [facility] residents aware

## 2017-11-24 NOTE — SOCIAL WORK
CM met with pt at bedside  Pt is alert and Martir Purvis is clear  Pt was admitted from [facility]-s/p total hip replacement  He lives with Caridad Barahona in a mobile home with 4 steps w/railing to enter the residence  Pt normally has no problem navigating steps and takes care of all ADL's  He has never used OP/PT or [facility] services  He uses [facility] and has no problem with his co-pays  He has an Advanced Directive and his POA is his son Victorino Brittle  He denies issues with drugs or alcohol  Denies hx of anxiety or depression  His PCP is Dr Gamaliel Rivera  He is retired and does not drive  His son will transport home when medically cleared  CM discussed discharge plan  Pt would like to return to [facility] to finish rehab or go home with [facility] services for Pt  CM will place referrals and keep pt informed  CM will continue to follow

## 2017-11-24 NOTE — STROKE DOCUMENTATION
Critical care at the bedside evaluating the patient and speaking with the family prior to TPA infusion

## 2017-11-24 NOTE — PROGRESS NOTES
Family @ bedside  Son is Luzmaria Greene and he is the spokesperson, Patient has multiple siblings: Asia(sister), Freddy(brother) Barrera(brother) and Tania(sister)  Luzmaria Greene was with his dad last night at [employer] which is in the [facility]  Last patient was not as responsive, he was given oxy for pain  No other actions last night but he was not acting himself and would perk up with the rehab staff  This am left sided weakness  Left droop, slurred speech and was sent to ER for followup  Patient received 2 units cryo, 2 bags albumin, 4 units PRBCS  Hemoglobin was 8 2, INR 1 09  Patient had hip surgery 4 days ago for revision and replacement of right hip hardware  Patient given TPA  And while receiving TPA, had a bad headache and pain in right hip  TPA stopped, Cat Scan of head was negative  Patient slowly improved  Left droop, smile  Left arm slight drift  XLY45  Patient was bathed  Patient states he cannot control bladder and urinates  One incontinent episode  Patient given 5mg lopressor for blood pressure >180  Patient is very pleasant

## 2017-11-24 NOTE — H&P
History and Physical - Critical Care  Diana Allen 80 y o  male MRN: [id number]  Unit/Bed#: ICU 14 Encounter: [id number]     Reason for Admission / Chief Complaint: CVA s/p TPA     History of Present Illness:  Diana Allen is a 80 y o  male with history of CAD s/p stents, HTN, HLD, DM-II, who is four days post-operative of explantation with reimplantation of right hip hardware for periprosthetic fracture who presented to the ED this morning with left sided weakness and dysarthria  CTH at that time was negative for acute hemorrhage and TPA was administered  Shortly after the TPA he developed worsening headache for which repeat CTH was completed and revealed no hemorrhage  He did however develop worsening pain and tension of his right thigh concerning for compartment syndrome  His hemoglobin at that point was 8 4 and he was administered 1U PRBC with repeat Hb 8 2 raising the concern for extravasation into the joint  He was transferred to [facility] for orthopedic follow-up as well as continued Neurology follow-up  On arrival he is awake and alert, reports pain in his right hip/thigh and that he is thirsty asking for water  He has slight headache which is significantly improved from earlier in the day  He also continues to have dysarthria and left upper extremity weakness  History obtained from chart review and the patient       Past Medical History:  Past Medical History:   Diagnosis Date    CAD (coronary artery disease)     [facility]     HLD (hyperlipidemia)     HTN (hypertension)       Past Surgical History:  Past Surgical History:   Procedure Laterality Date    HIP HARDWARE REMOVAL      TOTAL HIP ARTHROPLASTY Right       Past Family History:  Family History   Problem Relation Age of Onset    Family history unknown: Yes      Social History:  History   Smoking Status    Never Smoker   Smokeless Tobacco    Never Used     History   Alcohol Use No     History   Drug Use No Marital Status:      Medications:  Current Facility-Administered Medications   Medication Dose Route Frequency    iohexol (OMNIPAQUE) 350 MG/ML injection (MULTI-DOSE) 85 mL  85 mL Intravenous Once in imaging    metoprolol (LOPRESSOR) injection 5 mg  5 mg Intravenous Q6H PRN     Home medications:  Prior to Admission medications    Medication Sig Start Date End Date Taking?  Authorizing Provider   acetaminophen (TYLENOL) 325 mg tablet Take 650 mg by mouth every 4 (four) hours as needed for mild pain    Historical Provider, MD   amLODIPine (NORVASC) 10 mg tablet Take 1 tablet by mouth daily 5/30/17   Historical Provider, MD   ASPIRIN 81 PO Take 1 tablet by mouth daily    Historical Provider, MD   clopidogrel (PLAVIX) 75 mg tablet Take 1 tablet by mouth daily 1/9/17   Historical Provider, MD   docusate sodium (COLACE) 100 mg capsule Take 100 mg by mouth daily as needed for constipation    Historical Provider, MD   doxazosin (CARDURA) 4 mg tablet Take 1 tablet by mouth daily 7/6/15   Historical Provider, MD   ELEMENTAL MAGNESIUM PO Take 65 mg by mouth 2 (two) times a day with meals    Historical Provider, MD   glyBURIDE (DIABETA) 5 mg tablet Take 1 tablet by mouth daily with breakfast 1 tab at bedtime 12/19/16   Historical Provider, MD   lisinopril (ZESTRIL) 40 mg tablet Take 1 tablet by mouth daily 4/4/16   Historical Provider, MD   metoprolol succinate (TOPROL-XL) 25 mg 24 hr tablet Take 1 tablet by mouth every 12 (twelve) hours 6/1/15   Historical Provider, MD   oxybutynin (DITROPAN) 5 mg tablet Take 1 tablet by mouth daily 7/27/16   Historical Provider, MD   oxyCODONE (ROXICODONE) 5 mg immediate release tablet Take 5 mg by mouth every 4 (four) hours as needed for moderate pain    Historical Provider, MD   polyethylene glycol (MIRALAX) 17 g packet Take 17 g by mouth daily    Historical Provider, MD   senna (SENOKOT) 8 6 MG tablet Take 1 tablet by mouth daily as needed for constipation    Historical Provider, MD     Allergies: Allergies   Allergen Reactions    Celecoxib     Hydromorphone     Pravastatin Hives      ROS:   Review of Systems   Constitutional: Positive for chills  Negative for fever  HENT: Positive for trouble swallowing  Eyes: Negative  Respiratory: Negative  Cardiovascular: Negative  Gastrointestinal: Negative  Endocrine: Negative  Genitourinary: Negative  Musculoskeletal: Positive for arthralgias  Skin:        Right hip/thigh edematous and distended, painful to light palpation   Neurological: Positive for facial asymmetry, weakness and headaches  Hematological: Negative  Psychiatric/Behavioral: Negative  All other systems reviewed and are negative  Vitals:  Vitals:    17 1720 17 1734   Temp: (!) 97 3 °F (36 3 °C)    TempSrc: Oral    Weight:  106 kg (233 lb 4 oz)     Temperature:   Temp (24hrs), Av 8 °F (36 6 °C), Min:96 °F (35 6 °C), Max:99 °F (37 2 °C)    Current: Temperature: (!) 97 3 °F (36 3 °C)     Weights:   IBW: -88 kg  Body mass index is 32 99 kg/m²  Hemodynamic Monitoring:  N/A     Non-Invasive/Invasive Ventilation Settings:  Respiratory    Lab Data (Last 4 hours)    None         O2/Vent Data (Last 4 hours)    None              SpO2:       Physical Exam:  Physical Exam   Constitutional: He is oriented to person, place, and time  He appears well-developed and well-nourished  No distress  HENT:   Head: Normocephalic and atraumatic  Eyes: Pupils are equal, round, and reactive to light  Neck: No JVD present  No tracheal deviation present  Cardiovascular: Normal rate, regular rhythm and normal heart sounds  Exam reveals no gallop and no friction rub  No murmur heard  Pulmonary/Chest: Effort normal  No respiratory distress  Abdominal: Soft  There is no tenderness  There is no rebound and no guarding     Musculoskeletal:   Right hip surgical dressings in tact, some edema of thigh, tender to palpation, in tact distal pulses  No significant ecchymosis  Neurological: He is alert and oriented to person, place, and time  Skin: Skin is warm and dry  He is not diaphoretic  No pallor  Psychiatric: He has a normal mood and affect  Labs:    Results from last 7 days  Lab Units 11/24/17  1740 11/24/17  1518 11/24/17  1429  11/24/17  1009 11/23/17  0620   WBC Thousand/uL 12 02* 11 26*  --   --  7 84 8 45   HEMOGLOBIN g/dL 10 9* 8 3* 8 5*  --  8 4* 8 6*   I STAT HEMOGLOBIN   --   --   --   < >  --   --    HEMATOCRIT % 31 6* 24 3* 25 3*  --  25 2* 25 7*   PLATELETS Thousands/uL 123* 136*  --   --  148* 129*   NEUTROS PCT %  --   --   --   --   --  77*   MONOS PCT %  --   --   --   --   --  8   < > = values in this interval not displayed  Results from last 7 days  Lab Units 11/24/17  1013 11/24/17  1009 11/23/17  0620   SODIUM mmol/L  --  134* 136   POTASSIUM mmol/L  --  4 4 4 0   CHLORIDE mmol/L  --  100 104   CO2 mmol/L  --  28 25   BUN mg/dL  --  29* 28*   CREATININE mg/dL  --  1 52* 1 47*   CALCIUM mg/dL  --  8 8 8 9   TOTAL PROTEIN g/dL  --   --  6 2*   BILIRUBIN TOTAL mg/dL  --   --  0 40   ALK PHOS U/L  --   --  40*   ALT U/L  --   --  14   AST U/L  --   --  30   GLUCOSE RANDOM mg/dL  --  176* 152*   GLUCOSE, ISTAT mg/dl 171*  --   --        Results from last 7 days  Lab Units 11/24/17  1519 11/24/17  1009   INR  1 09 1 07   PTT seconds 31 44*           0  Lab Value Date/Time   TROPONINI 0 04 11/24/2017 1741   TROPONINI 0 03 11/24/2017 1519   TROPONINI 0 03 11/24/2017 1429   TROPONINI 0 03 11/24/2017 1009      Imaging:    Ct Chest Abdomen Pelvis Wo Contrast Result Date: 11/24/2017  Impression: 1  Soft tissue swelling and patchy gas adjacent to the right hip compatible with recent surgery as per history  Collection in the right hip lateral  subcutaneous tissues measures 4 6 x 2 8 cm, slightly hyperdense, likely a small postoperative collection/hematoma with small droplets of air    This could be followed with ultrasound if there is clinical concern for interval enlargement  2   2 7 cm hypodense lesion in the left kidney middle pole posteriorly does not measure simple fluid attenuation  This could be a result of streak artifact  Recommend follow-up with renal ultrasound nonemergently  3   Tiny amount of air in the bladder lumen, nonspecific could be related to recent catheterization, underlying infection is not excluded  Correlate clinically  4   Focal skin thickening noted anterior to the sternum just right of midline measuring up to 1 6 cm  Correlate clinically for skin lesion  Rounded cyst more inferiorly anterior to the sternum measuring up to 2 2 cm possibly a sebaceous cyst      Ct Head Wo Contrast Result Date: 11/24/2017  Impression: No acute intracranial abnormality  Stable head CT from earlier the same day  Xr Stroke Alert Portable Chest Result Date: 11/24/2017  Impression: No active pulmonary disease  Ct Stroke Alert Brain Result Date: 11/24/2017  Impression: No acute intracranial hemorrhage seen Moderate periventricular and white matter hypodensity seen likely due to chronic small vessel ischemic changes      Us Extremity Soft Tissue Result Date: 11/24/2017  Impression: No obvious focal fluid collections could be confirmed on this exam  Recommend interval follow-up CT if there is clinical concern for enlarging hematoma Or abscess     Cta Stroke Alert (head/neck) Result Date: 11/24/2017  Impression: There is no large vessel occlusion noted within the intracranial circulation There is a small focal area of foot severe stenosis at the region of the inferior division of the right MCA with narrowing in the range of  90% There is moderate to severe atherosclerotic disease with the narrowing in the proximal to midportion of the basilar artery There is lack of enhancement noted within the small segment of the distal cervical portion of the right vertebral artery with the markedly decreased caliber of the visualized portion of the cervical vertebral artery  This may be due to total or subtotal  occlusion of the right vertebral artery  No significant carotid stenosis Central canal stenosis of the cervical spine at C3-4 and C4-5 level  I personally discussed this study with Hieu Ann on 11/24/2017 10:33 AM      EKG: pending  Micro:  No results found for: Chris Haque, SPUTUMCULTUR    Assessment: 80 Y/OM with history of HTN, HLD, recent revision of prosthesis at [facility] [address]  who presented with acute left sided weakness and dysarthria now s/p TPA, transferred to \[facility]\"" for post TPA care      Plan:      Neuro:   Acute lacunar CVA S/P TPA  -continue goal SBP <180  -Continue Q2H neurovascular checks  -stat CTH for any acute neuro change, currently dysarthric with left upper extremity weakness 3/5 and pronator drift  -follow-up Neurology consult  -follow-up PT/OT, PMR  -no AC/AP for 24H post TPA, repeat CTH to be done tomorrow morning around 10am  -NPO until swallow evaluation   -follow-up echocardiogram     CV:   HTN  -continue holding Norvasc, Toprol, Lisinopril, continue Labetalol PRN SBP goal <180    Acute blood loss anemia  -now s/p 4U PRBC, last Hb 10 9 at 17:40, repeat Q4H H and H x3     Lung:   Continue to encourage deep breathing and cough, ISS     GI:   NPO until passes swallow eval, continue Zofran PRN nausea     FEN:   Hyponatremia  -Na 134 on admission, not currently on fluid 2/2 4U PRBC and concern for volume overload, consider lasix if develops signs of acute overload     :    Monitor Strict I/Os     ID:   No infectious concerns at this time, continue to monitor off antibiotics     Heme:   Hb 10 9, now s/p 4U PRBC, follow Q4H H/Hx3    LUE DVT  -LUE duplex revealed brachial vein DVT, now s/p TPA for CVA, monitor clinically      Endo:   HbA1c 7 2, hold home Metformin and glyburide, will cover with ISS Q6H while NPO     Msk/Skin:   Encourage frequent turning and repositioning, implement early ambulation     Disposition: Admit to ICU for frequent neuro checks and post TPA monitoring      VTE Pharmacologic Prophylaxis: Reason for no pharmacologic prophylaxis S/P TPA  VTE Mechanical Prophylaxis: sequential compression device     Invasive lines and devices: Invasive Devices     Peripheral Intravenous Line            Peripheral IV 11/24/17 Left Antecubital less than 1 day    Peripheral IV 11/24/17 Right Antecubital less than 1 day    Peripheral IV 11/24/17 Right Arm less than 1 day                 Code Status: Level 1 - Full Code  POA:    POLST:       Given critical illness, patient length of stay will require greater than two midnights  Kiet ESTEVEZ    Internal Medicine PGY-3  11/24/2017 6:50 PM

## 2017-11-24 NOTE — DISCHARGE SUMMARY
Discharge Summary   Gagandeep Sprague 80 y o  male MRN: [id number]    [facility] Room / Bed: / Encounter: [id number]      Admitting Provider: Jacinta Cuevas MD  Discharge Provider: Jacinta Cuevas MD  Admission Date: 11/24/2017     Discharge Date: No discharge date for patient encounter  Primary Care Physician at Discharge: Albert Lennox, DO [phone]    Primary Discharge Diagnosis  Principal Problem:    [facility])  Active Problems:    HTN (hypertension)    DM2 (diabetes mellitus, type 2) ([facility] )    HLD (hyperlipidemia)    CAD (coronary artery disease)    Postoperative anemia    Right hip pain    Acute deep vein thrombosis (DVT) of brachial vein of left upper extremity (HCC)    Coagulopathy ([facility] )  Resolved Problems:    * No resolved hospital problems   *      Other Problems Addressed  Patient Active Problem List    Diagnosis Date Noted    [facility]) 11/24/2017    HTN (hypertension) 11/24/2017    DM2 (diabetes mellitus, type 2) ([facility] ) 11/24/2017    HLD (hyperlipidemia) 11/24/2017    CAD (coronary artery disease) 11/24/2017    Postoperative anemia 11/24/2017    Right hip pain 11/24/2017    Acute deep vein thrombosis (DVT) of brachial vein of left upper extremity ([facility] ) 11/24/2017    Coagulopathy ([facility] ) 11/24/2017       Consulting Providers   Neurology, Orthopedics    Diagnostic Procedures Performed  Left upper extremity ultrasound, CT head, CTA head, CT chest abdomen pelvis noncontrast  Treatments   IV hydration, blood transfusions, cryoprecipitate  Procedures   TPA administration  Other Pertinent Test Results    Presenting Problem/History of Present Illness  [facility])    Hospital Course  51-year-old male with past medical history significant for CAD with stents, hypertension, hyperlipidemia, diabetes type 2 who underwent an explantation and reimplantation of right hip hardware 4 days previous presented to the emergency room with left-sided weakness and dysarthria  Stroke alert was called  CT of the head was negative for bleed  After evaluation by the neurologist, the option of fibrinolytic therapy was made to the family and to the patient after extensive discussion regarding the risks including, but not limited to, hemorrhage and death  Approximately 20 minutes prior to the completion of the infusion, patient complained of extreme left temporal parietal headache and right hip pain  TPA was held immediately, and CT head chest abdomen pelvis was obtained  CT of the head was without acute intracranial hemorrhage  A collection was noted at the right hip on CT, likely representing hematoma/blood collection  Patient was concomitantly being administered 1 unit of packed red blood cells due to hemoglobin of 8 2 in the setting of cardiovascular disease  Upon completion of this blood the hemoglobin only gina to 8 4  This lack of response of the hemoglobin in the setting of increasing swelling and pain in the right hip could represent extravasation of blood into the joint, with increased risk for compartment syndrome  Orthopedics consulted and suggested transfer to [place], in the event that intervention was required  Patient's blood pressure begins to drop while awaiting transport, resulting and cryoprecipitate and packed red blood cell administration with transient response  Dr Suha Oreilly accepts patient in transfer  Events of this afternoon reviewed with the family, who consented transfer  Dr Dalton Jennings  made aware as well  Physical Exam  Vitals: Blood pressure 106/56, pulse 84, temperature (!) 96 °F (35 6 °C), resp  rate 18, height 5' 10 5" (1 791 m), weight 102 kg (224 lb 3 3 oz), SpO2 99 %  ,Body mass index is 31 72 kg/m²    General Appearance:    Alert, cooperative, no distress   Head:    Normocephalic, without obvious abnormality, atraumatic   Eyes:    PERRL, conjunctiva/corneas clear        Neck:   Supple, symmetrical, trachea midline, no adenopathy   Back:     Symmetric, ROM normal, no CVA tenderness   Lungs:     Clear to auscultation bilaterally, respirations unlabored   Heart:    Regular rate and rhythm, S1 and S2 normal, no murmur, rub   or gallop   Abdomen:     Soft, non-tender, bowel sounds active    Extremities:   Extremities normal, atraumatic, no cyanosis or edema   Neurologic:   Left sided weakness 2/5, RLE 2/5, RUE 4/5  Dysarthria worsened from presentation in emergency room  Left field cut at present  Left facial droop now noted  Discharge Disposition: Admitted as an inpatient to this hospital  Facility:  [facility]  Discharged With Lines: Peripheral lines    Test Results Pending at Discharge  Left upper extremity duplex official read    Medications   See after visit summary for reconciled discharge medications provided to patient and family  Allergies  Allergies   Allergen Reactions    Celecoxib     Hydromorphone     Pravastatin Hives     Diet restrictions: NPO  Activity restrictions:  Bedrest  Discharge Condition: critical      Outpatient Follow-Up  To be determined  Follow up with consulting providers  To be determined       Code Status: Level 1 - Full Code  Advance Directive and Living Will: <no information>  Power of :    POLST:      Discharge  Statement   I spent 30 minutes discharging the patient  This time was spent on the day of discharge  I had direct contact with the patient on the day of discharge  Additional documentation is required if more than 30 minutes were spent on discharge

## 2017-11-24 NOTE — ED NOTES
Alteplase dual signed off with Christa Christianson RN by this RN      Shi Bryan, JELENA  11/24/17 1231

## 2017-11-24 NOTE — TREATMENT PLAN
Pt with newly evolving right hip pain, right thigh induration, and oozing blood from surgical site  Pt is likely bleeding into his hip and thigh  Concern for hemorrhage as well as compartment syndrome  Pt will be transferred to [facility]  Pt to receive additional blood products as well as cryoprecipitate  Family meeting to discuss evolving clinical status  Family aware of worsening clinical status and plan to transfer to [facility]

## 2017-11-24 NOTE — ED PROVIDER NOTES
History  Chief Complaint   Patient presents with    CVA/TIA-like Symptoms     pt c/o left arm weakness since this morning  Talitha Burkitt is a 80 y o  male w PMH CAD, DM, HTN, HLD who presents for evaluation of L arm weakness  Patient was sent down from the [place] [address]  At around 8:00 a m  this morning patient was examined by the nurse practitioner and had a normal examination  He was then seen by occupational therapy at about 9:30 a m  He was complaining of trouble speaking at that time but the occupational therapist and nurse did not notice any impairment in the speech  The occupational therapist did notice "left-sided coordination issues" and patient was complaining of some weakness of the left arm  He was sent downstairs and staff reported noticing left-sided facial droop on the way down to the ED  Patient himself can't tell me exactly when the symptoms began  He had surgery for left hip replacement 11/20  He is on ASA and Plavix but denies anticoagulation agents even after the recent procedure  He has chest pain or tightness, denies abdominal pain, nausea, vomiting, diarrhea or constipation denies shortness of breath lightheadedness or dizziness  Denies prior ICH, GI bleed  He is also of swelling to the left arm and pain in the left anterior shoulder  Prior to Admission Medications   Prescriptions Last Dose Informant Patient Reported? Taking?    ASPIRIN 81 PO 11/23/2017  Yes Yes   Sig: Take 1 tablet by mouth daily   ELEMENTAL MAGNESIUM PO 11/23/2017  Yes Yes   Sig: Take 65 mg by mouth 2 (two) times a day with meals   acetaminophen (TYLENOL) 325 mg tablet 11/23/2017  Yes Yes   Sig: Take 650 mg by mouth every 4 (four) hours as needed for mild pain   amLODIPine (NORVASC) 10 mg tablet 11/23/2017  Yes Yes   Sig: Take 1 tablet by mouth daily   clopidogrel (PLAVIX) 75 mg tablet 11/23/2017  Yes Yes   Sig: Take 1 tablet by mouth daily   docusate sodium (COLACE) 100 mg capsule 11/23/2017  Yes Yes   Sig: Take 100 mg by mouth daily as needed for constipation   doxazosin (CARDURA) 4 mg tablet 11/23/2017  Yes Yes   Sig: Take 1 tablet by mouth daily   glyBURIDE (DIABETA) 5 mg tablet 11/23/2017  Yes Yes   Sig: Take 1 tablet by mouth daily with breakfast 1 tab at bedtime   lisinopril (ZESTRIL) 40 mg tablet 11/23/2017  Yes Yes   Sig: Take 1 tablet by mouth daily   metoprolol succinate (TOPROL-XL) 25 mg 24 hr tablet 11/23/2017  Yes Yes   Sig: Take 1 tablet by mouth every 12 (twelve) hours   oxyCODONE (ROXICODONE) 5 mg immediate release tablet 11/23/2017  Yes Yes   Sig: Take 5 mg by mouth every 4 (four) hours as needed for moderate pain   oxybutynin (DITROPAN) 5 mg tablet 11/23/2017  Yes Yes   Sig: Take 1 tablet by mouth daily   polyethylene glycol (MIRALAX) 17 g packet 11/23/2017  Yes Yes   Sig: Take 17 g by mouth daily   senna (SENOKOT) 8 6 MG tablet 11/23/2017  Yes Yes   Sig: Take 1 tablet by mouth daily as needed for constipation      Facility-Administered Medications: None       Past Medical History:   Diagnosis Date    CAD (coronary artery disease)     [facility]     HLD (hyperlipidemia)     HTN (hypertension)        Past Surgical History:   Procedure Laterality Date    HIP HARDWARE REMOVAL      TOTAL HIP ARTHROPLASTY Right        History reviewed  No pertinent family history  I have reviewed and agree with the history as documented  Social History   Substance Use Topics    Smoking status: Never Smoker    Smokeless tobacco: Never Used    Alcohol use No        Review of Systems   Constitutional: Negative for activity change, chills, diaphoresis, fatigue and fever  HENT: Negative for congestion and rhinorrhea  Eyes: Negative for pain  Respiratory: Negative for cough, chest tightness, shortness of breath and wheezing  Cardiovascular: Negative for chest pain and palpitations     Gastrointestinal: Negative for abdominal distention, constipation, diarrhea, nausea and vomiting  Genitourinary: Negative for difficulty urinating and dysuria  Musculoskeletal: Negative for arthralgias and myalgias  Neurological: Positive for weakness  Negative for dizziness, light-headedness and headaches  Psychiatric/Behavioral: The patient is not nervous/anxious  Physical Exam  ED Triage Vitals   Temperature Pulse Respirations Blood Pressure SpO2   11/24/17 0956 11/24/17 0956 11/24/17 0956 11/24/17 0956 11/24/17 0956   98 6 °F (37 °C) 73 16 160/74 97 %      Temp Source Heart Rate Source Patient Position - Orthostatic VS BP Location FiO2 (%)   11/24/17 0956 11/24/17 0956 11/24/17 0956 11/24/17 0956 --   Oral Monitor Lying Left arm       Pain Score       11/24/17 1345       Worst Possible Pain           Orthostatic Vital Signs  Vitals:    11/24/17 1600 11/24/17 1606 11/24/17 1615 11/24/17 1630   BP: (!) 175/74 (!) 175/74 (!) 179/102 (!) 194/90   Pulse: 84 90 90 90   Patient Position - Orthostatic VS:           Physical Exam   Constitutional: He is oriented to person, place, and time  He appears well-developed and well-nourished  No distress  HENT:   Head: Normocephalic and atraumatic  Eyes: Pupils are equal, round, and reactive to light  Neck: Normal range of motion  Neck supple  No tracheal deviation present  Cardiovascular: Normal rate, regular rhythm, normal heart sounds and intact distal pulses  Exam reveals no gallop and no friction rub  No murmur heard  Pulmonary/Chest: Effort normal and breath sounds normal  No respiratory distress  He has no wheezes  He has no rales  Abdominal: Soft  Bowel sounds are normal  He exhibits no distension and no mass  There is no tenderness  There is no guarding  Musculoskeletal: He exhibits no edema or deformity  Neurological: He is alert and oriented to person, place, and time  A cranial nerve deficit is present  No sensory deficit   Coordination normal    The patient has obvious left-sided facial droop involving his mouth  Otherwise no cranial nerve deficits  He has no slurred speech initially but he is complaining of trouble speaking  He has left-sided arm weakness in all muscle groups of the left upper extremity with 4/5 strength in comparison to the right with 5/5 strength  He has 5/5 strength in the left lower extremity but is unable to flex and extend the lower extremity given his recent hip procedure  Sensation to light touch intact  Cerebellar function intact  Normal gaze  Alert and oriented to person place  He cannot tell me the exact date but he is aware it is the day after Thanksgiving and is aware of the year  He can tell me what he did yesterday and this morning  Skin: Skin is warm and dry  Capillary refill takes less than 2 seconds  He is not diaphoretic  Psychiatric: He has a normal mood and affect  His behavior is normal    Nursing note and vitals reviewed        ED Medications  Medications   iohexol (OMNIPAQUE) 350 MG/ML injection (MULTI-DOSE) 85 mL (85 mL Intravenous Given 11/24/17 1018)   alteplase (ACTIVASE) bolus 9 mg (9 mg Intravenous Given 11/24/17 1232)     Followed by   alteplase (ACTIVASE) infusion 81 mg (0 mg Intravenous Stopped 11/24/17 1312)     Followed by   sodium chloride 0 9 % infusion (0 mL/hr Intravenous Stopped 11/24/17 1343)   albumin human (FLEXBUMIN) 5 % injection 25 g (0 g Intravenous Stopped 11/24/17 1618)       Diagnostic Studies  Results Reviewed     Procedure Component Value Units Date/Time    POCT troponin [[id number]]  (Abnormal) Collected:  11/24/17 1029    Lab Status:  Final result Updated:  11/24/17 1042     POC Troponin I 0 10 (H) ng/ml      Specimen Type VENOUS    Narrative:         [employer] i-Stat handheld analyzer 99% cutoff is > 0 08ng/mL in network Emergency Departments    o cTnI 99% cutoff is useful only when applied to patients in the clinical setting of myocardial ischemia  o cTnI 99% cutoff should be interpreted in the context of clinical history, ECG findings and possibly cardiac imaging to establish correct diagnosis  o cTnI 99% cutoff may be suggestive but clearly not indicative of a coronary event without the clinical setting of myocardial ischemia  Troponin I [[id number]]  (Normal) Collected:  11/24/17 1009    Lab Status:  Final result Specimen:  Blood from Arm, Right Updated:  11/24/17 1036     Troponin I 0 03 ng/mL     Narrative:         [employer] Chemistry analyzer 99% cutoff is > 0 04 ng/mL in network labs    o cTnI 99% cutoff is useful only when applied to patients in the clinical setting of myocardial ischemia  o cTnI 99% cutoff should be interpreted in the context of clinical history, ECG findings and possibly cardiac imaging to establish correct diagnosis  o cTnI 99% cutoff may be suggestive but clearly not indicative of a coronary event without the clinical setting of myocardial ischemia  APTT [[id number]]  (Abnormal) Collected:  11/24/17 1009    Lab Status:  Final result Specimen:  Blood from Arm, Right Updated:  11/24/17 1029     PTT 44 (H) seconds     Narrative: Therapeutic Heparin Range = 60-90 seconds    Protime-INR [[id number]]  (Normal) Collected:  11/24/17 1009    Lab Status:  Final result Specimen:  Blood from Arm, Right Updated:  11/24/17 1029     Protime 14 1 seconds      INR 3 19    Basic metabolic panel [[id number]]  (Abnormal) Collected:  11/24/17 1009    Lab Status:  Final result Specimen:  Blood from Arm, Right Updated:  11/24/17 1026     Sodium 134 (L) mmol/L      Potassium 4 4 mmol/L      Chloride 100 mmol/L      CO2 28 mmol/L      Anion Gap 6 mmol/L      BUN 29 (H) mg/dL      Creatinine 1 52 (H) mg/dL      Glucose 176 (H) mg/dL      Calcium 8 8 mg/dL      eGFR 43 ml/min/1 73sq m     Narrative:         National Kidney Disease Education Program recommendations are as follows:  GFR calculation is accurate only with a steady state creatinine  Chronic Kidney disease less than 60 ml/min/1 73 sq  meters  Kidney failure less than 15 ml/min/1 73 sq  meters  POCT Chem 8+ [[id number]]  (Abnormal) Collected:  11/24/17 1013    Lab Status:  Final result Updated:  11/24/17 1017     SODIUM, I-STAT 134 (L) mmol/l      Potassium, i-STAT 4 4 mmol/L      Chloride, istat 97 (L) mmol/L      CO2, i-STAT 24 mmol/L      Anion Gap, Istat 18 (H) mmol/L      Calcium, Ionized i-STAT 1 18 mmol/L      BUN, I-STAT 27 (H) mg/dl      Creatinine, i-STAT 1 5 (H) mg/dl      eGFR 44 ml/min/1 73sq m      Glucose, i-STAT 171 (H) mg/dl      Hct, i-STAT 24 (L) %      Hgb, i-STAT 8 2 (L) g/dl      Specimen Type VENOUS    CBC [[id number]]  (Abnormal) Collected:  11/24/17 1009    Lab Status:  Final result Specimen:  Blood from Arm, Right Updated:  11/24/17 1013     WBC 7 84 Thousand/uL      RBC 2 76 (L) Million/uL      Hemoglobin 8 4 (L) g/dL      Hematocrit 25 2 (L) %      MCV 91 fL      MCH 30 4 pg      MCHC 33 3 g/dL      RDW 12 7 %      Platelets 908 (L) Thousands/uL      MPV 9 8 fL     Fingerstick Glucose (POCT) [[id number]]  (Abnormal) Collected:  11/24/17 1004    Lab Status:  Final result Updated:  11/24/17 1005     POC Glucose 193 (H) mg/dl                  US extremity soft tissue   Final Result by Donya Licona DO (11/24 1640)   No obvious focal fluid collections could be confirmed on this exam    Recommend interval follow-up CT if there is clinical concern for enlarging hematoma Or abscess         Workstation performed: [id number]         CT chest abdomen pelvis wo contrast   Final Result by Damaris Floyd MD (11/24 1410)      1  Soft tissue swelling and patchy gas adjacent to the right hip compatible with recent surgery as per history  Collection in the right hip lateral  subcutaneous tissues measures 4 6 x 2 8 cm, slightly hyperdense, likely a small postoperative    collection/hematoma with small droplets of air  This could be followed with ultrasound if there is clinical concern for interval enlargement     2   2 7 cm hypodense lesion in the left kidney middle pole posteriorly does not measure simple fluid attenuation  This could be a result of streak artifact  Recommend follow-up with renal ultrasound nonemergently  3   Tiny amount of air in the bladder lumen, nonspecific could be related to recent catheterization, underlying infection is not excluded  Correlate clinically  4   Focal skin thickening noted anterior to the sternum just right of midline measuring up to 1 6 cm  Correlate clinically for skin lesion  Rounded cyst more inferiorly anterior to the sternum measuring up to 2 2 cm possibly a sebaceous cyst          Workstation performed: [id number]         CT head wo contrast   Final Result by Orville Llamas MD (11/24 1345)      No acute intracranial abnormality  Stable head CT from earlier the same day  Workstation performed: [id number]         CTA stroke alert (head/neck)   Final Result by Jane Hatch MD (11/24 1111)      There is no large vessel occlusion noted within the intracranial circulation      There is a small focal area of foot severe stenosis at the region of the inferior division of the right MCA with narrowing in the range of  90%      There is moderate to severe atherosclerotic disease with the narrowing in the proximal to midportion of the basilar artery      There is lack of enhancement noted within the small segment of the distal cervical portion of the right vertebral artery with the markedly decreased caliber of the visualized portion of the cervical vertebral artery  This may be due to total or subtotal    occlusion of the right vertebral artery        No significant carotid stenosis      Central canal stenosis of the cervical spine at C3-4 and C4-5 level           I personally discussed this study with Rashard Villa on 11/24/2017 10:33 AM           I personally discussed this study with Dr Dylan Alegria on 11/24/2017 11:10 AM                    Workstation performed: [id number] XR stroke alert portable chest   Final Result by Brodie Diggs MD (11/24 1104)      No active pulmonary disease  Workstation performed: [id number]         CT stroke alert brain   Final Result by Elicia Castro MD (11/24 1022)      No acute intracranial hemorrhage seen   Moderate periventricular and white matter hypodensity seen likely due to chronic small vessel ischemic changes      Findings were directly discussed with Zina Britt on 11/24/2017 10:20 AM          Workstation performed: [id number]         VAS upper limb venous duplex scan, unilateral/limited    (Results Pending)              Procedures  CriticalCare Time  Performed by: Scott Servin  Authorized by: Scott Servin     Critical care provider statement:     Critical care time (minutes):  80    Critical care was necessary to treat or prevent imminent or life-threatening deterioration of the following conditions:  CNS failure or compromise    Critical care was time spent personally by me on the following activities:  Blood draw for specimens, obtaining history from patient or surrogate, development of treatment plan with patient or surrogate, discussions with consultants, evaluation of patient's response to treatment, examination of patient, review of old charts, re-evaluation of patient's condition, ordering and review of radiographic studies, ordering and review of laboratory studies and ordering and performing treatments and interventions  Comments:      Symptoms concerning for acute stroke requiring frequent neuro assessments, tPA, and discussion w neuro, critical care as well as orthopedics from [facility]            Phone Contacts  ED Phone Contact    ED Course  ED Course as of Nov 24 1733 Fri Nov 24, 2017   1022 The patients son now presents to the ED and reports he visited yesterday and he was much more tired than usual but when he did wake up in the afternoon his speech was abnormally slurred   Was apparently evaluated by a nurse at time and had normal stroke assessment as per the son and per son at time of RN assessment the slurred speech had resolved  Son did not notice a facial droop yesterday  [address] read as per Dr Solorzano Hy negative for acute hemorrhage     1109 EKG Interpretation    Rate: 69 BPM  Rhythm: NSR w infrequent PVC  Axis: normal  Intervals: Normal, no blocks, QTc 484ms  Q waves: II, III, aVF  T waves: inverted in III, V1, V3, biphasic in V4 - V6, flattened in V2  ST segments: no depression / elevation     Impression: abnormal EKG  EKG for comparison: 12/9/08 - sinus tachycardia, T wave changes today are new   EKG interpreted by me  1138 Stool sample obtained and was grossly negative for blood and guiac negative for blood  1238 Delay in TPA administration bc of relative contraindication  Family and patient did not want to make decision until daughter in law who is a nurse was present  Once present she and family spoke extensively to Dr Mariah Jauregui  Discussion held amongst myself with the family discussing risk of recent major surgery with risk vs benefit of providing TPA most notable benefit no worsening of current sx or potential resolution of symptoms  [address] PA-C from orthopedic team at [place] [address]  who was in the OR w the patient 4 days ago during hip replacement  She reports the blood loss was standard, no excessive blood loss and confirmed blood loss was 500cc as the daughter in law had believed it to be  She was unaware of his pre-op Hg  From her orthopedic standpoint she has no contra-indication to TPA                 NIH Stroke Scale    Flowsheet Row Most Recent Value   Level of Consciousness (1a )  0 Filed at: 11/24/2017 1005   LOC Questions (1b )  0 Filed at: 11/24/2017 1005   LOC Commands (1c )  0 Filed at: 11/24/2017 1005   Best Gaze (2 )  0 Filed at: 11/24/2017 1005   Visual (3 )  0 Filed at: 11/24/2017 1005   Facial Palsy (4 )  1 Filed at: 11/24/2017 1005   Motor Arm, Left (5a )  2 Filed at: 11/24/2017 1005   Motor Arm, Right (5b )  0 Filed at: 11/24/2017 1005   Motor Leg, Left (6a )  0 Filed at: 11/24/2017 1005   Motor Leg, Right (6b )  0 Filed at: 11/24/2017 1005   Limb Ataxia (7 )  0 Filed at: 11/24/2017 1005   Sensory (8 )  0 Filed at: 11/24/2017 1005   Best Language (9 )  0 Filed at: 11/24/2017 1005   Dysarthria (10 )  0 Filed at: 11/24/2017 1005   Extinction and Inattention (11 ) (Formerly Neglect)  0 Filed at: 11/24/2017 1005   Total  3 Filed at: 11/24/2017 1005                        MDM  Number of Diagnoses or Management Options  Left-sided weakness:   Diagnosis management comments: DDX includes but not ltd to:   Patient presented with symptoms concerning for stroke occurring less than an hour ago  I called the [facility] therapy team upstairs and spoke with the patient's caretaker who reported the nurse practitioner saw him this morning and at that time the patient was normal  At 9:30 a m  occupational therapy noticed left-sided coordination impairment and the patient was complaining of trouble speaking  He was then transferred downstairs here for evaluation  Following evaluation a stroke alert was called  Neurology responded promptly  Evaluated patient with plan for tPA given his deficits  Dr Adele Fox discussed with Dr Jill Shell who advised tPA w concern sx may progress to LLE which would cause more severe deficit  Dr Adele Fox then discussed the risks and benefits at length with the patient and his family member  I personally discussed the risks and benefits with the patient's family member including his son and daughter and his daughter-in-law  At this time the patient, daughter-in-law, son and daughter were all in agreement that they would wish to undergo tPA understanding the risk of bleeding to death, or brain bleed, worsening bleed particularly from his hip and recent incision  At this time critical care team became involved in the decision  They evaluated the patient and personally discussed the risks and benefits of tPA with the patient and the family  Dr Winsome Nassar was re-contacted by Dr Jennifer Birch to discuss case  I contacted the patient's orthopedic surgery team from [place] [address]  I was able to speak with the PA who confirmed the EBL to be 500cc, denied significant blood loss in the OR and reported this was standard procedure without complication  I again discussed the risks and benefits at length with the family and patient, most concerning death from [address] / edema or ABLA related to recent hip wound  Family and patient are aware of the risk given recent surgery and his 5gm drop in Hemoglobin from baseline (unknown Hg directly prior to procedure)  Throughout the patient's stay in ED while discussing w consultants the patients speech symptoms progressively worsened  Initially normal w no dysarthria or aphasia but later developed slurred speech  Ultimately all of patients family members and patient himself are in agreement to initiate tPA despite risks and his known relative contraindications  Suspect the patients elevated troponin is reactive  Denies chest pain  Negative troponin I  Concern for LUE DVT give swollen extremity / pain in comparison to the RUE and given no AC post operatively  Do not suspect PE as no CP whatsoever  Portions of the record may have been created with voice recognition software   Occasional wrong word or "sound a like" substitutions may have occurred due to the inherent limitations of voice recognition software   Read the chart carefully and recognize, using context, where substitutions have occurred          CritCare Time    Disposition  Final diagnoses:   Left-sided weakness     Time reflects when diagnosis was documented in both MDM as applicable and the Disposition within this note     Time User Action Codes Description Comment    11/24/2017 10:07 AM Jaskaran Segovia Add [R53 1] Left-sided weakness 11/24/2017 11:48 AM Brenda Carter Mon Add [S72 001S] Hip fx, right, sequela     11/24/2017  2:41 PM Philip Carter 95 [D64 9] Postoperative anemia       ED Disposition     ED Disposition Condition Comment    Admit  Case was discussed with Dr Danilo Stovall and the patient's admission status was agreed to be Admission Status: inpatient status to the service of Dr Danilo Stovall           Follow-up Information    None       Discharge Medication List as of 11/24/2017  4:50 PM      CONTINUE these medications which have NOT CHANGED    Details   acetaminophen (TYLENOL) 325 mg tablet Take 650 mg by mouth every 4 (four) hours as needed for mild pain, Historical Med      amLODIPine (NORVASC) 10 mg tablet Take 1 tablet by mouth daily, Starting Tue 5/30/2017, Historical Med      ASPIRIN 81 PO Take 1 tablet by mouth daily, Historical Med      clopidogrel (PLAVIX) 75 mg tablet Take 1 tablet by mouth daily, Starting Mon 1/9/2017, Historical Med      docusate sodium (COLACE) 100 mg capsule Take 100 mg by mouth daily as needed for constipation, Historical Med      doxazosin (CARDURA) 4 mg tablet Take 1 tablet by mouth daily, Starting Mon 7/6/2015, Historical Med      ELEMENTAL MAGNESIUM PO Take 65 mg by mouth 2 (two) times a day with meals, Historical Med      glyBURIDE (DIABETA) 5 mg tablet Take 1 tablet by mouth daily with breakfast 1 tab at bedtime, Starting Mon 12/19/2016, Historical Med      lisinopril (ZESTRIL) 40 mg tablet Take 1 tablet by mouth daily, Starting Mon 4/4/2016, Historical Med      metoprolol succinate (TOPROL-XL) 25 mg 24 hr tablet Take 1 tablet by mouth every 12 (twelve) hours, Starting Mon 6/1/2015, Historical Med      oxybutynin (DITROPAN) 5 mg tablet Take 1 tablet by mouth daily, Starting Wed 7/27/2016, Historical Med      oxyCODONE (ROXICODONE) 5 mg immediate release tablet Take 5 mg by mouth every 4 (four) hours as needed for moderate pain, Historical Med      polyethylene glycol (MIRALAX) 17 g packet Take 17 g by mouth daily, Historical Med      senna (SENOKOT) 8 6 MG tablet Take 1 tablet by mouth daily as needed for constipation, Historical Med           No discharge procedures on file      ED Provider  Electronically Signed by           Félix Lockhart PA-C  11/24/17 1210

## 2017-11-24 NOTE — TREATMENT PLAN
Family Meeting    Risks and benefits of tPA reiterated with the patient and his family (including his son and healthcare POA)  Patient is four days s/p right hip explantation and replacement for fractured hardware at [place] [address]  in [place], with a documented EBL of 500 cc and preoperative hemoglobin of approximately 13 (now 8)  Decision to administer tPA made by the family after extensive discussion with neurology and the ED team prior to ICU admission, and patient and family are insistent on pursuing this intervention despite multiple efforts to make the significant risk clear to them  tPA was initiated in the ED      Alan Person MD

## 2017-11-24 NOTE — CONSULTS
Orthopedics   Salas Soni 80 y o  male MRN: [id number]  Unit/Bed#: ICU 14-01      Chief Complaint:   Right hip pain    HPI:  80 y o male recently underwent a revision MAREN at [facility] 4 days presented to [facility] with stroke like symptoms  TPA was given and pt transferred to [place]  Now pt complaining of right hip pain w/o radiation  Made worse with motion or direct contact to the area  Denies numbness/tingling  Review Of Systems:   · Skin: right hip incisin  · Neuro: See HPI  · Musculoskeletal: See HPI  · 14 point review of systems negative except as stated above     Past Medical History:   Past Medical History:   Diagnosis Date    CAD (coronary artery disease)     DM2 (diabetes mellitus, type 2) ([address] )     HLD (hyperlipidemia)     HTN (hypertension)        Past Surgical History:   Past Surgical History:   Procedure Laterality Date    HIP HARDWARE REMOVAL      TOTAL HIP ARTHROPLASTY Right        Family History:  Family history reviewed and non-contributory  No family history on file  Social History:  Social History     Social History    Marital status:      Spouse name: N/A    Number of children: N/A    Years of education: N/A     Social History Main Topics    Smoking status: Never Smoker    Smokeless tobacco: Never Used    Alcohol use No    Drug use: No    Sexual activity: Not on file     Other Topics Concern    Not on file     Social History Narrative    No narrative on file       Allergies:    Allergies   Allergen Reactions    Celecoxib     Hydromorphone     Pravastatin Hives           Labs:    0  Lab Value Date/Time   HCT 31 6 (L) 11/24/2017 1740   HCT 24 3 (L) 11/24/2017 1518   HCT 25 3 (L) 11/24/2017 1429   HGB 10 9 (L) 11/24/2017 1740   HGB 8 3 (L) 11/24/2017 1518   HGB 8 5 (L) 11/24/2017 1429   INR 1 09 11/24/2017 1519   WBC 12 02 (H) 11/24/2017 1740   WBC 11 26 (H) 11/24/2017 1518   WBC 7 84 11/24/2017 1009   ESR 30 (H) 06/01/2016 1414   CRP 7 8 (H) 06/01/2016 1414       Meds:    Current Facility-Administered Medications:     iohexol (OMNIPAQUE) 350 MG/ML injection (MULTI-DOSE) 85 mL, 85 mL, Intravenous, Once in imaging, Time JALEEL Roper    metoprolol (LOPRESSOR) injection 5 mg, 5 mg, Intravenous, Q6H PRN, Time JALEEL Roper    Blood Culture:   No results found for: BLOODCX    Wound Culture:   No results found for: WOUNDCULT    Ins and Outs:  No intake/output data recorded  Physical Exam:   Temp (!) 97 3 °F (36 3 °C) (Oral)   Gen: Alert and oriented to person, place, time  HEENT: EOMI, eyes clear, moist mucus membranes, hearing intact  Respiratory: Bilateral chest rise  No audible wheezing found  Cardiovascular: Regular Rate and Rhythm  Abdomen: soft nontender/nondistended  Musculoskeletal: right lower extremity  · Right hip incision clean and intact with small amount of drainage on dressing  · Thigh and gluteal muscles swollen but compressible  · No pain with passive hip/knee flexion, extension, ab/adduction  · SILT s/s/sp/dp/t    · +fhl/ehl/ta/gsc  · Toes wwp    Radiology:   I personally reviewed the films  CT abd pelvis shows a hematoma in the right hip    _*_*_*_*_*_*_*_*_*_*_*_*_*_*_*_*_*_*_*_*_*_*_*_*_*_*_*_*_*_*_*_*_*_*_*_*_*_*_*_*_*    Assessment:  81 y o male s/p revision right MAREN at [facility] with thigh pain after TPA administration for stroke    Low concern for compartment syndrome     Plan:   · Continue NV and compartment checks  · Weight bearing and therapy per [facility]  · Please contact primary surgeon to notify of pts conditino  · Critical care per ICU team  · Pain control    Rasheeda Iqbal MD

## 2017-11-24 NOTE — H&P
History and Physical - Critical Care   Muna Sims 80 y o  male MRN: [id number]  Unit/Bed#: ED 12 Encounter: [id number]    Reason for Admission / Chief Complaint:  Left-sided weakness and garbled speech    History of Present Illness:  Muna Sims is a 80 y o  male with past medical history significant for CAD with most recent stenting in March of this year, hypertension, diabetes type 2 who presents to the emergency room from [facility] 4 days post hardware removal and total right hip replacement with garbled speech and left-sided weakness that started around 9:25 this morning  Stroke alert was called at 10:11 a m , and was seen by Neurology at 10:15 a m     CT head without acute bleed  Risks and benefits of thrombolytics were discussed with the patient's family by Neurology and the emergency room  Patient family accept the risk of hemorrhage and/or death related to the administration of tPA in the related postop period  This was confirmed by the critical care medicine team as the medication was being administered  Patient's only other complaint related to left shoulder pain postoperatively  His left upper extremity is more swollen than the right, with intact neurovascular status  He complains of anterior shoulder pain that is constant and sharp  Palpation increases pain  History obtained from the patient and patient's family  Past Medical History:  Past Medical History:   Diagnosis Date    CAD (coronary artery disease)     [facility]     HLD (hyperlipidemia)     HTN (hypertension)        Past Surgical History:  Past Surgical History:   Procedure Laterality Date    HIP HARDWARE REMOVAL      TOTAL HIP ARTHROPLASTY Right        Past Family History:  History reviewed  No pertinent family history     Family relates history of CAD and emphysema    Social History:  History   Smoking Status    Never Smoker   Smokeless Tobacco    Never Used     History   Alcohol Use No     History   Drug Use No     Marital Status:       Medications:  Current Facility-Administered Medications   Medication Dose Route Frequency    alteplase (ACTIVASE) infusion 81 mg  81 mg Intravenous Once     Home medications:  Prior to Admission medications    Medication Sig Start Date End Date Taking?  Authorizing Provider   acetaminophen (TYLENOL) 325 mg tablet Take 650 mg by mouth every 4 (four) hours as needed for mild pain   Yes Historical Provider, MD   amLODIPine (NORVASC) 10 mg tablet Take 1 tablet by mouth daily 5/30/17  Yes Historical Provider, MD   ASPIRIN 81 PO Take 1 tablet by mouth daily   Yes Historical Provider, MD   clopidogrel (PLAVIX) 75 mg tablet Take 1 tablet by mouth daily 1/9/17  Yes Historical Provider, MD   docusate sodium (COLACE) 100 mg capsule Take 100 mg by mouth daily as needed for constipation   Yes Historical Provider, MD   doxazosin (CARDURA) 4 mg tablet Take 1 tablet by mouth daily 7/6/15  Yes Historical Provider, MD   ELEMENTAL MAGNESIUM PO Take 65 mg by mouth 2 (two) times a day with meals   Yes Historical Provider, MD   glyBURIDE (DIABETA) 5 mg tablet Take 1 tablet by mouth daily with breakfast 1 tab at bedtime 12/19/16  Yes Historical Provider, MD   lisinopril (ZESTRIL) 40 mg tablet Take 1 tablet by mouth daily 4/4/16  Yes Historical Provider, MD   metoprolol succinate (TOPROL-XL) 25 mg 24 hr tablet Take 1 tablet by mouth every 12 (twelve) hours 6/1/15  Yes Historical Provider, MD   oxybutynin (DITROPAN) 5 mg tablet Take 1 tablet by mouth daily 7/27/16  Yes Historical Provider, MD   oxyCODONE (ROXICODONE) 5 mg immediate release tablet Take 5 mg by mouth every 4 (four) hours as needed for moderate pain   Yes Historical Provider, MD   polyethylene glycol (MIRALAX) 17 g packet Take 17 g by mouth daily   Yes Historical Provider, MD   senna (SENOKOT) 8 6 MG tablet Take 1 tablet by mouth daily as needed for constipation   Yes Historical Provider, MD Allergies: Allergies   Allergen Reactions    Celecoxib     Hydromorphone     Pravastatin Hives       ROS:   Review of Systems   HENT: Negative  Eyes: Negative  Respiratory: Negative  Cardiovascular: Negative  Gastrointestinal: Negative  Endocrine: Negative  Genitourinary: Negative  Musculoskeletal: Positive for joint swelling and myalgias  Skin: Positive for pallor  Allergic/Immunologic: Negative  Neurological: Positive for facial asymmetry, speech difficulty and weakness  Negative for tremors, seizures, syncope and headaches  Hematological: Negative  Psychiatric/Behavioral: Negative  Vitals:  Vitals:    17 1200 17 1215 17 1216 17 1237   BP: 151/56 162/72 (!) 179/76 165/76   Pulse: 72 72 68 77   Resp: 18 18 18 18   Temp:   99 °F (37 2 °C) 99 °F (37 2 °C)   TempSrc:   Oral    SpO2: 99% 100% 99%    Weight:         Temperature:   Temp (24hrs), Av 9 °F (37 2 °C), Min:98 6 °F (37 °C), Max:99 °F (37 2 °C)    Current Temperature: 99 °F (37 2 °C)    Weights:   IBW: -88 kg  There is no height or weight on file to calculate BMI  Hemodynamic Monitoring:  N/A     Non-Invasive/Invasive Ventilation Settings:  Respiratory    Lab Data (Last 4 hours)    None         O2/Vent Data (Last 4 hours)    None              No results found for: PHART, OMW3OAO, PO2ART, VAV9RXC, A6TZRQRG, BEART, SOURCE  SpO2: SpO2: 99 %     Physical Exam:  Physical Exam   Constitutional: He is oriented to person, place, and time  He appears well-developed and well-nourished  No distress  HENT:   Head: Normocephalic and atraumatic  Nose: Nose normal    Mouth/Throat: Oropharynx is clear and moist    Eyes: Conjunctivae and EOM are normal  Pupils are equal, round, and reactive to light  No scleral icterus  Neck: Normal range of motion  Neck supple  No JVD present  No tracheal deviation present     Cardiovascular: Normal rate, regular rhythm, normal heart sounds and intact distal pulses  Exam reveals no gallop and no friction rub  No murmur heard  Pulmonary/Chest: Effort normal and breath sounds normal    Abdominal: Soft  Bowel sounds are normal  He exhibits no distension  There is no tenderness  There is no rebound and no guarding  Musculoskeletal: Normal range of motion  He exhibits no edema, tenderness or deformity  Neurological: He is alert and oriented to person, place, and time  No cranial nerve deficit or sensory deficit  GCS eye subscore is 4  GCS verbal subscore is 5  GCS motor subscore is 6  Positive for left-sided weakness 4/5, right lower extremity 3/5 due to recent hip surgery, right upper extremity 5/5  Unable to assess for pronator drift due to shoulder pain  Positive dysarthria  Negative for receptive aphasia  Skin: Skin is warm and dry  No rash noted  There is pallor  Psychiatric: He has a normal mood and affect   His behavior is normal        Labs:    Results from last 7 days  Lab Units 11/24/17  1013 11/24/17  1009 11/23/17  0620   WBC Thousand/uL  --  7 84 8 45   HEMOGLOBIN g/dL  --  8 4* 8 6*   I STAT HEMOGLOBIN g/dl 8 2*  --   --    HEMATOCRIT %  --  25 2* 25 7*   PLATELETS Thousands/uL  --  148* 129*   NEUTROS PCT %  --   --  77*   MONOS PCT %  --   --  8      Results from last 7 days  Lab Units 11/24/17  1013 11/24/17  1009 11/23/17  0620   SODIUM mmol/L  --  134* 136   POTASSIUM mmol/L  --  4 4 4 0   CHLORIDE mmol/L  --  100 104   CO2 mmol/L  --  28 25   BUN mg/dL  --  29* 28*   CREATININE mg/dL  --  1 52* 1 47*   CALCIUM mg/dL  --  8 8 8 9   TOTAL PROTEIN g/dL  --   --  6 2*   BILIRUBIN TOTAL mg/dL  --   --  0 40   ALK PHOS U/L  --   --  40*   ALT U/L  --   --  14   AST U/L  --   --  30   GLUCOSE RANDOM mg/dL  --  176* 152*   GLUCOSE, ISTAT mg/dl 171*  --   --                 Results from last 7 days  Lab Units 11/24/17  1009   INR  1 07   PTT seconds 44*           0  Lab Value Date/Time   TROPONINI 0 03 11/24/2017 1009       Imaging:   CTA stroke alert (head/neck)   Final Result      There is no large vessel occlusion noted within the intracranial circulation      There is a small focal area of foot severe stenosis at the region of the inferior division of the right MCA with narrowing in the range of  90%      There is moderate to severe atherosclerotic disease with the narrowing in the proximal to midportion of the basilar artery      There is lack of enhancement noted within the small segment of the distal cervical portion of the right vertebral artery with the markedly decreased caliber of the visualized portion of the cervical vertebral artery  This may be due to total or subtotal    occlusion of the right vertebral artery  No significant carotid stenosis      Central canal stenosis of the cervical spine at C3-4 and C4-5 level           I personally discussed this study with Cosmo Laurent on 11/24/2017 10:33 AM           I personally discussed this study with Dr Heike Cardenas on 11/24/2017 11:10 AM                    Workstation performed: [id number]         XR stroke alert portable chest   Final Result      No active pulmonary disease  Workstation performed: [id number]         CT stroke alert brain   Final Result      No acute intracranial hemorrhage seen   Moderate periventricular and white matter hypodensity seen likely due to chronic small vessel ischemic changes      Findings were directly discussed with Cosmo Laurent on 11/24/2017 10:20 AM          Workstation performed: [id number]         XR shoulder 2+ vw left    (Results Pending)   VAS upper limb venous duplex scan, unilateral/limited    (Results Pending)      I have personally reviewed pertinent reports     and I have personally reviewed pertinent films in PACS     Micro:  No results found for: [facility], [facility]    ______________________________________________________________________    Assessment:   Patient Active Problem List   Diagnosis    Stroke ([facility] )    HTN (hypertension)    DM2 (diabetes mellitus, type 2) ([facility] )    HLD (hyperlipidemia)    CAD (coronary artery disease)    Postoperative anemia           Plan:      Neuro:  Serial neuro exams  TPA protocol  Swallow eval   Neurology following  Unable to start statins secondary to patient's admission of hives related to same  Repeat head CT if neurologic changes or concern for head bleed  CV:  Continue Lopressor and Ace inhibitors  Aspirin and Plavix to be continued 24 hours post tPA if cleared by Neurology, for stent management  2D echo ordered  Pulm:  No acute issues identified  Encourage incentive spirometry to prevent atelectasis  GI:  No issues identified at present  :  No issues identified at present  F/E/N:  NPO at present, will require a swallow evaluation  Normal saline maintenance fluids for now  ID:  No acute issues identified at present  Heme:  Hemoglobin 8 2 in the setting of recent surgery  Will provide 1 unit of packed red blood cells due to coronary artery disease  Endo:  Accu-Cheks AC and HS for now  Will consider restarting home anti diabetic agents in a m   Insulin coverage for now as needed  Msk/Skin:  Orthopedics consulted for recent right hip replacement  Patient's family has requested that he not receive narcotics, therefore making Tylenol his only option at present  Disposition:  Admit patient to ICU for close monitoring    Counseling / Coordination of Care  Total Critical Care time spent 55 minutes excluding procedures, teaching and family updates  ______________________________________________________________________    VTE Pharmacologic Prophylaxis: Defer to present due to thrombolysis  VTE Mechanical Prophylaxis: sequential compression device    Invasive lines and devices:   Invasive Devices     Peripheral Intravenous Line            Peripheral IV 11/24/17 Left Antecubital less than 1 day    Peripheral IV 11/24/17 Right Antecubital less than 1 day    Peripheral IV 11/24/17 Right Arm less than 1 day                Code Status: Level 1 - Full Code  POA:    POLST:      Given critical illness, patient length of stay will require greater than two midnights  Portions of the record may have been created with voice recognition software  Occasional wrong word or "sound a like" substitutions may have occurred due to the inherent limitations of voice recognition software  Read the chart carefully and recognize, using context, where substitutions have occurred  Lendell Essex, CRNP     Addendum  Called back to the emergency department during tPA infusion for acute onset of severe headache in the left temporoparietal region  Patient described a sharp 9/10 pain  TPA placed on hold and stat head CT obtained  Neurology notified, and Dr Dylan Alegria determined there is no acute head bleed  He advised at this time at the balance of the tPA be discarded, rather than administered to the patient  Nursing instructed same  Dr Magdi Funes aware

## 2017-11-24 NOTE — SIGNIFICANT EVENT
Was called by ICU team, earlier on, the patient had a headache while tPA was being infused, tPA was held CT scan of the brain was repeated there was no evidence of any ICH  Subsequently the patient's blood pressure has dropped and he has been noted to be bleeding at the right hip surgical site and developing compartment syndrome  Patient is being aggressively managed by the ICU team and is being transferred to the [address] [place] [address] for further orthopedic intervention and management  on examining the patient at this time his left hemipareses appears worse although there was clinical improvement subsequent to his tPA infusion, but since his blood pressure has dropped patient has had worsening signs with slurred speech, left hemiparesis and also developing a field cut  Met with the family, informed them about his condition they were given the opportunity to ask questions, all of the months of the satisfaction and the agreeable to transferring him to [place] for further management

## 2017-11-24 NOTE — CONSULTS
Consultation - Neurology   Clement Osgood 80 y o  male MRN: [id number]  Unit/Bed#: ED 12 Encounter: [id number]      Physician Requesting Consult: No att  providers found  Reason for Consult:  Left-sided weakness  Hx and PE limited by:  None    HPI: Clement Osgood is a right handed  80 y o  male who  was in his usual state of health 4 days ago underwent a right hip replacement at [place] [address]  in [place] and was discharged 2 days ago to [address] [place] [address] on aspirin and Plavix, Plavix was apparently initiated the same day as his discharged to [address] [place] [address]  Patient was noticed by his son to be a little confused and had slurred speech last night but apparently resolved within a few minutes  This morning the patient was noted by the occupational therapist and physical therapist and the nursing staff to have slurred speech, and also complained of left-sided weakness of his hand  Patient was sent down to the emergency room for further evaluation  Onset of symptoms was 9:25 a m  patient was seen by me at 10:15 a m  stroke alert was called at 10:11 a m  Agapito Goddrad Patient continues to have slurred speech, left upper extremity weakness, and complains of pain in the left shoulder  He claims he woke up this morning with left shoulder pain  He denies any pain in the right hip at the surgical site, and denies any motor or sensory symptoms on the right side  He has no history of CVA or seizure disorder and his blood pressure was noted to be 160/80  He has no history of any intracranial hemorrhage in the past and no history of any bleeding in the recent past   CT scan of the brain done showed evidence of bilateral basal ganglia old lacunar changes and no evidence of any acute CVA or any hemorrhage  Review of Systems:  See history of present illness for pertinent neurological symptoms  All other systems are negative  Historical Information   History reviewed  No pertinent past medical history    Past Surgical History:   Procedure Laterality Date    TOTAL HIP ARTHROPLASTY Right      Social History   History   Smoking Status    Never Smoker   Smokeless Tobacco    Never Used     History   Alcohol Use No     History   Drug Use No       Family History:   History reviewed  No pertinent family history  Allergies   Allergen Reactions    Celecoxib     Hydromorphone     Pravastatin        Meds:  All current active meds have been reviewed    Scheduled Meds:  PRN Meds:     Physical Exam:   Objective   Vitals:   Vitals:    11/24/17 1040   BP: (!) 178/75   Pulse: 79   Resp: 18   Temp:    SpO2: 100%   ,There is no height or weight on file to calculate BMI  Patient was examined in emergency department bed  General appearance: Cooperative in no acute distress  Head & neck head is atraumatic and normocephalic  Neck is supple with full range of motion  Cardiovascular: Carotid arteriesno carotid bruits  Neurologic:   Patient is alert awake oriented, high functions are intact, speech is fluent  No evidence of any aphasia but the patient has evidence of  dysarthria  Cranial nerve examination reveals visual fields are full to threat, pupils equal and reactive, extraocular movements intact, fundi showed sharp disc margins, sensation in the V1 V2 V3 distribution is symmetric, patient has evidence of left facial weakness, tongue is midline and gag is adequate  Motor examination reveals left upper extremity pronator drift, with evidence of weakness in the left upper extremity in the 4/5 range distally greater than proximally  Proximal left arm strength was somewhat limited due to left shoulder pain patient also has evidence of left lower extremity weakness in the 4/5 range, deep tendon reflexes absent on the left side, trace in the right upper extremity and trace in the right lower extremity, right toe is downgoing left is with a positive Babinski     Sensory examination to pinprick light touch proprioception and vibration is preserved bilaterally, patient does not extinguish double simultaneous stimuli  Coordination there is no evidence of any finger-to-nose dysmetria on the right side, mildly present on the left side and proportion to the motor weakness  Gait could not be evaluated  Lab Results:Lab Results:   CBC:   Results from last 7 days  Lab Units 11/24/17  1013 11/24/17  1009 11/23/17  0620   WBC Thousand/uL  --  7 84 8 45   RBC Million/uL  --  2 76* 2 82*   HEMOGLOBIN g/dL  --  8 4* 8 6*   I STAT HEMOGLOBIN g/dl 8 2*  --   --    HEMATOCRIT %  --  25 2* 25 7*   MCV fL  --  91 91   PLATELETS Thousands/uL  --  148* 129*     I have personally reviewed pertinent reports  EEG, Echo, Pathology, and Other Studies: I have personally reviewed pertinent films in PACS    Family, was present at the bedside for history and examination  Assessment:  1  Acute lacunar CVA most likely secondary to small vessel disease  2  History of diabetes and hyperlipidemia  3  History of right hip replacement 4 days ago with significant anemia  Plan: At this time a lengthy discussion occurred with the patient and his family at the bedside, regarding the possibility of intravenous tPA, risk of bleeding, and that the face patient fits into the criteria except for his right hip replacement and his low hemoglobin and hematocrit  The case was also discussed with our stroke specialist, patient's NIH score stroke scale is at 6, CTA of the head showed no evidence of any acute thrombus mild stenosis of the right posterior division of the MCA was noted but no evidence of any significant intracranial or extracranial carotid artery stenosis  The official report is still pending  After lengthy discussion with the family explaining to them all the pros and cons on the risk of bleeding including at the surgical site, the low hemoglobin, and close monitoring it was decided the visual proceed with IV tPA at the present time    Patient is two hours down from the onset of symptoms and his examination was read performed by myself, his NIH score is at 5-6 with mild improvement of his left leg weakness  He has persistent dysarthria, right upper extremity weakness, dysmetria, and mild right leg weakness  Family and patient is very aware of the complications that could occur as a result of the IV tPA and would like to proceed with the same  Patient will also need orthopedic evaluation due to his recent postop right hip surgery and has been experiencing pain in the left shoulder since this morning  He may need IV blood transfusion and stool guaiac will be performed prior to tPA  Patient will be closely monitored in the ICU and would recommend an MRI of the brain in a m  unless contraindicated and will follow IV tPA protocol and stroke pathway  Will hold aspirin and Plavix at this time  Counseling / Coordination of Care  Total time spent today 90 minutes  Greater than 50% of total time was spent with the patient and / or family counseling and / or coordination of care  A description of the counseling / coordination of care:  Was discussed with the ED team, and the family at the bedside      Irlanda Cardozo MD  11/24/2017,10:53 AM    "This note has been constructed using a voice recognition system "

## 2017-11-24 NOTE — PLAN OF CARE
Problem: DISCHARGE PLANNING - CARE MANAGEMENT  Goal: Discharge to post-acute care or home with appropriate resources  INTERVENTIONS:  - Conduct assessment to determine patient/family and health care team treatment goals, and need for post-acute services based on payer coverage, community resources, and patient preferences, and barriers to discharge  - Address psychosocial, clinical, and financial barriers to discharge as identified in assessment in conjunction with the patient/family and health care team  - Arrange appropriate level of post-acute services according to patient's   needs and preference and payer coverage in collaboration with the physician and health care team  - Communicate with and update the patient/family, physician, and health care team regarding progress on the discharge plan  - Arrange appropriate transportation to post-acute venues  Outcome: Progressing  CM met with pt at bedside  Pt is alert and Kayley Moser is clear  Pt was admitted from [facility]-s/p total hip replacement  He lives with Bassam James in a mobile home with 4 steps w/railing to enter the residence  Pt normally has no problem navigating steps and takes care of all ADL's  He has never used OP/PT or [facility] services  He uses [facility] and has no problem with his co-pays  He has an Advanced Directive and his POA is his son Yadira Iqbal  He denies issues with drugs or alcohol  Denies hx of anxiety or depression  His PCP is Dr Duke Hussein  He is retired and does not drive  His son will transport home when medically cleared  CM discussed discharge plan  Pt would like to return to [facility] to finish rehab or go home with [facility] services for Pt  CM will place referrals and keep pt informed  CM will continue to follow

## 2017-11-24 NOTE — CONSULTS
Orthopedics   Becky Gaytan 80 y o  male MRN: [id number]  Unit/Bed#: MO XRAY      Chief Complaint:   right hip pain    HPI:   80 y o male complaining of right hip pain  patient has right hip replacement about 20 years ago and past few months was having increased pain and was noted to have fracture around right hip prothesis  He underwent right hip revision at [facility] ([facility] 3 days ago and was doing rehab at [facility] when he stared to have left sided weakness and garbled speech  Stoke protocol was initiated and the decision by the family was to give patient tPA which was administered in the ER  He started to complain of right hip pain that became worse  He was having pain since the revision surgery  Denies any numbness in lower extremity  Has pain over the right hip incision and thigh  Bandage was soiled  He does not recall the surgeons name that did the operation  Attempts were made to call [facility] but surgeons were in [address] and unavailable to discuss patient  CT does show some fluid collections compatible with recent surgery  Review Of Systems:   · Skin: See HPI  · Neuro: See HPI  · Musculoskeletal: See HPI  · 14 point review of systems negative except as stated above     Past Medical History:   Past Medical History:   Diagnosis Date    CAD (coronary artery disease)     DM2 (diabetes mellitus, type 2) ([facility] [address] )     HLD (hyperlipidemia)     HTN (hypertension)        Past Surgical History:   Past Surgical History:   Procedure Laterality Date    HIP HARDWARE REMOVAL      TOTAL HIP ARTHROPLASTY Right        Family History:  Family history reviewed and non-contributory  History reviewed  No pertinent family history  Social History:  Social History     Social History    Marital status:       Spouse name: N/A    Number of children: N/A    Years of education: N/A     Social History Main Topics    Smoking status: Never Smoker    Smokeless tobacco: Never Used    Alcohol use No  Drug use: No    Sexual activity: Not Asked     Other Topics Concern    None     Social History Narrative    None       Allergies: Allergies   Allergen Reactions    Celecoxib     Hydromorphone     Pravastatin Hives           Labs:    0  Lab Value Date/Time   HCT 25 3 (L) 11/24/2017 1429   HCT 25 2 (L) 11/24/2017 1009   HCT 25 7 (L) 11/23/2017 0620   HGB 8 5 (L) 11/24/2017 1429   HGB 8 2 (L) 11/24/2017 1013   HGB 8 4 (L) 11/24/2017 1009   HGB 8 6 (L) 11/23/2017 0620   INR 1 07 11/24/2017 1009   WBC 7 84 11/24/2017 1009   WBC 8 45 11/23/2017 0620   WBC 4 85 04/24/2017 0822   ESR 30 (H) 06/01/2016 1414   CRP 7 8 (H) 06/01/2016 1414       Meds:    Current Facility-Administered Medications:     metoprolol (LOPRESSOR) injection 5 mg, 5 mg, Intravenous, Q6H PRN, JALEEL Michael    Blood Culture:   No results found for: BLOODCX    Wound Culture:   No results found for: WOUNDCULT    Ins and Outs:  No intake/output data recorded  Physical Exam:   /61   Pulse 81   Temp 98 6 °F (37 °C)   Resp (!) 28   Ht 5' 10 5" (1 791 m)   Wt 102 kg (224 lb 3 3 oz)   SpO2 99%   BMI 31 72 kg/m²   Gen: Alert and oriented to person, place, time  HEENT: EOMI, eyes clear, moist mucus membranes, hearing intact  Respiratory: Bilateral chest rise  No audible wheezing found  Cardiovascular: Regular Rate and Rhythm  Abdomen: soft nontender/nondistended  Musculoskeletal: right hip  · Large posterolateral incision noted with soiled bandage which was removed  Incision with slight bloody drainage  No erythema noted around incision  Tenderness noted with palpation around incision and into thigh  Skin in thigh and right buttock was less compressible compared to the right side  Slight ecchymosis noted right buttock as well  · Sensation intact to light touch L1-S1  · Patient with pain upon moving right leg  Able to flex knee, flex and extend ankle     · 2+ DP pulse    Radiology:   I personally reviewed the films   CT Abd and Pelvis shows ST swelling and patchy gas adjacent to the right hip compatible with recent surgery  Collection right lateral hip subcutaneus tissues 4 6 x2 8cm    _*_*_*_*_*_*_*_*_*_*_*_*_*_*_*_*_*_*_*_*_*_*_*_*_*_*_*_*_*_*_*_*_*_*_*_*_*_*_*_*_*    Assessment:  81 y o male with recent right hip total revision 3 days ago by [facility]  During early rehab patient was noted to have stroke and was given tPA in ER  Patient now with increased pain and swelling in the right thigh  Patient with cardiac history , recent stroke with tPA administered and had total hip revision few days ago  Patient with medical comorbidity and would benefit from transfer to [facility] to be monitored in case patient does develop compartment syndrome over the weekend  Plan:   · WBAT right lower extremity  · PT/OT  · Pain control per medical team  · Please contact operating surgeon to make them aware of patients condition  · He will follow up with his surgeon for hip aftercare after discharge  · Follow up posterior hip precautions  · Dispo: Recommend send to [place] to be monitored            Lima Eubanks PA-C

## 2017-11-25 ENCOUNTER — APPOINTMENT (INPATIENT)
Dept: RADIOLOGY | Facility: HOSPITAL | Age: 82
DRG: 553 | End: 2017-11-25
Payer: MEDICARE

## 2017-11-25 ENCOUNTER — APPOINTMENT (INPATIENT)
Dept: NON INVASIVE DIAGNOSTICS | Facility: HOSPITAL | Age: 82
DRG: 553 | End: 2017-11-25
Payer: MEDICARE

## 2017-11-25 LAB
ABO GROUP BLD BPU: NORMAL
ALBUMIN SERPL BCP-MCNC: 2.1 G/DL (ref 3.5–5)
ALP SERPL-CCNC: 33 U/L (ref 46–116)
ALT SERPL W P-5'-P-CCNC: 13 U/L (ref 12–78)
ANION GAP SERPL CALCULATED.3IONS-SCNC: 7 MMOL/L (ref 4–13)
AST SERPL W P-5'-P-CCNC: 26 U/L (ref 5–45)
BASOPHILS # BLD AUTO: 0.01 THOUSANDS/ΜL (ref 0–0.1)
BASOPHILS NFR BLD AUTO: 0 % (ref 0–1)
BILIRUB DIRECT SERPL-MCNC: 0.28 MG/DL (ref 0–0.2)
BILIRUB SERPL-MCNC: 1.27 MG/DL (ref 0.2–1)
BPU ID: NORMAL
BUN SERPL-MCNC: 35 MG/DL (ref 5–25)
CALCIUM SERPL-MCNC: 7.8 MG/DL (ref 8.3–10.1)
CHLORIDE SERPL-SCNC: 103 MMOL/L (ref 100–108)
CHOLEST SERPL-MCNC: 92 MG/DL (ref 50–200)
CO2 SERPL-SCNC: 25 MMOL/L (ref 21–32)
CREAT SERPL-MCNC: 1.5 MG/DL (ref 0.6–1.3)
CROSSMATCH: NORMAL
EOSINOPHIL # BLD AUTO: 0.04 THOUSAND/ΜL (ref 0–0.61)
EOSINOPHIL NFR BLD AUTO: 1 % (ref 0–6)
ERYTHROCYTE [DISTWIDTH] IN BLOOD BY AUTOMATED COUNT: 15 % (ref 11.6–15.1)
GFR SERPL CREATININE-BSD FRML MDRD: 44 ML/MIN/1.73SQ M
GLUCOSE SERPL-MCNC: 172 MG/DL (ref 65–140)
GLUCOSE SERPL-MCNC: 183 MG/DL (ref 65–140)
GLUCOSE SERPL-MCNC: 236 MG/DL (ref 65–140)
GLUCOSE SERPL-MCNC: 237 MG/DL (ref 65–140)
GLUCOSE SERPL-MCNC: 244 MG/DL (ref 65–140)
HCT VFR BLD AUTO: 24.8 % (ref 36.5–49.3)
HCT VFR BLD AUTO: 24.8 % (ref 36.5–49.3)
HDLC SERPL-MCNC: 28 MG/DL (ref 40–60)
HGB BLD-MCNC: 8.7 G/DL (ref 12–17)
HGB BLD-MCNC: 8.9 G/DL (ref 12–17)
LDLC SERPL CALC-MCNC: 43 MG/DL (ref 0–100)
LYMPHOCYTES # BLD AUTO: 1.11 THOUSANDS/ΜL (ref 0.6–4.47)
LYMPHOCYTES NFR BLD AUTO: 13 % (ref 14–44)
MAGNESIUM SERPL-MCNC: 2.1 MG/DL (ref 1.6–2.6)
MCH RBC QN AUTO: 30.1 PG (ref 26.8–34.3)
MCHC RBC AUTO-ENTMCNC: 35.9 G/DL (ref 31.4–37.4)
MCV RBC AUTO: 84 FL (ref 82–98)
MONOCYTES # BLD AUTO: 0.85 THOUSAND/ΜL (ref 0.17–1.22)
MONOCYTES NFR BLD AUTO: 10 % (ref 4–12)
NEUTROPHILS # BLD AUTO: 6.61 THOUSANDS/ΜL (ref 1.85–7.62)
NEUTS SEG NFR BLD AUTO: 76 % (ref 43–75)
NRBC BLD AUTO-RTO: 0 /100 WBCS
PHOSPHATE SERPL-MCNC: 2.9 MG/DL (ref 2.3–4.1)
PLATELET # BLD AUTO: 134 THOUSANDS/UL (ref 149–390)
PMV BLD AUTO: 9.9 FL (ref 8.9–12.7)
POTASSIUM SERPL-SCNC: 4.3 MMOL/L (ref 3.5–5.3)
PROT SERPL-MCNC: 5.4 G/DL (ref 6.4–8.2)
RBC # BLD AUTO: 2.96 MILLION/UL (ref 3.88–5.62)
SODIUM SERPL-SCNC: 135 MMOL/L (ref 136–145)
TRIGL SERPL-MCNC: 107 MG/DL
TROPONIN I SERPL-MCNC: 0.08 NG/ML
TSH SERPL DL<=0.05 MIU/L-ACNC: 1.03 UIU/ML (ref 0.36–3.74)
UNIT DISPENSE STATUS: NORMAL
UNIT PRODUCT CODE: NORMAL
UNIT RH: NORMAL
WBC # BLD AUTO: 8.65 THOUSAND/UL (ref 4.31–10.16)

## 2017-11-25 PROCEDURE — 70450 CT HEAD/BRAIN W/O DYE: CPT

## 2017-11-25 PROCEDURE — 85018 HEMOGLOBIN: CPT | Performed by: INTERNAL MEDICINE

## 2017-11-25 PROCEDURE — 93306 TTE W/DOPPLER COMPLETE: CPT

## 2017-11-25 PROCEDURE — 83735 ASSAY OF MAGNESIUM: CPT | Performed by: INTERNAL MEDICINE

## 2017-11-25 PROCEDURE — 85014 HEMATOCRIT: CPT | Performed by: INTERNAL MEDICINE

## 2017-11-25 PROCEDURE — C9113 INJ PANTOPRAZOLE SODIUM, VIA: HCPCS | Performed by: INTERNAL MEDICINE

## 2017-11-25 PROCEDURE — 70551 MRI BRAIN STEM W/O DYE: CPT

## 2017-11-25 PROCEDURE — 82948 REAGENT STRIP/BLOOD GLUCOSE: CPT

## 2017-11-25 PROCEDURE — 80048 BASIC METABOLIC PNL TOTAL CA: CPT | Performed by: INTERNAL MEDICINE

## 2017-11-25 PROCEDURE — 84484 ASSAY OF TROPONIN QUANT: CPT | Performed by: NURSE PRACTITIONER

## 2017-11-25 PROCEDURE — 84443 ASSAY THYROID STIM HORMONE: CPT | Performed by: INTERNAL MEDICINE

## 2017-11-25 PROCEDURE — 85025 COMPLETE CBC W/AUTO DIFF WBC: CPT | Performed by: INTERNAL MEDICINE

## 2017-11-25 PROCEDURE — 80076 HEPATIC FUNCTION PANEL: CPT | Performed by: NURSE PRACTITIONER

## 2017-11-25 PROCEDURE — 92610 EVALUATE SWALLOWING FUNCTION: CPT

## 2017-11-25 PROCEDURE — 80061 LIPID PANEL: CPT | Performed by: INTERNAL MEDICINE

## 2017-11-25 PROCEDURE — 84100 ASSAY OF PHOSPHORUS: CPT | Performed by: INTERNAL MEDICINE

## 2017-11-25 RX ORDER — SODIUM CHLORIDE, SODIUM GLUCONATE, SODIUM ACETATE, POTASSIUM CHLORIDE, MAGNESIUM CHLORIDE, SODIUM PHOSPHATE, DIBASIC, AND POTASSIUM PHOSPHATE .53; .5; .37; .037; .03; .012; .00082 G/100ML; G/100ML; G/100ML; G/100ML; G/100ML; G/100ML; G/100ML
75 INJECTION, SOLUTION INTRAVENOUS CONTINUOUS
Status: DISCONTINUED | OUTPATIENT
Start: 2017-11-25 | End: 2017-11-26

## 2017-11-25 RX ORDER — PANTOPRAZOLE SODIUM 40 MG/1
40 INJECTION, POWDER, FOR SOLUTION INTRAVENOUS
Status: DISCONTINUED | OUTPATIENT
Start: 2017-11-25 | End: 2017-11-25

## 2017-11-25 RX ORDER — OXYCODONE HYDROCHLORIDE 5 MG/1
5 TABLET ORAL EVERY 4 HOURS PRN
Status: DISCONTINUED | OUTPATIENT
Start: 2017-11-25 | End: 2017-12-04

## 2017-11-25 RX ADMIN — OXYCODONE HYDROCHLORIDE 2.5 MG: 5 TABLET ORAL at 12:30

## 2017-11-25 RX ADMIN — LIDOCAINE 1 PATCH: 50 PATCH TOPICAL at 08:04

## 2017-11-25 RX ADMIN — SODIUM CHLORIDE, SODIUM GLUCONATE, SODIUM ACETATE, POTASSIUM CHLORIDE, MAGNESIUM CHLORIDE, SODIUM PHOSPHATE, DIBASIC, AND POTASSIUM PHOSPHATE 75 ML/HR: .53; .5; .37; .037; .03; .012; .00082 INJECTION, SOLUTION INTRAVENOUS at 08:08

## 2017-11-25 RX ADMIN — METOPROLOL TARTRATE 25 MG: 25 TABLET ORAL at 12:30

## 2017-11-25 RX ADMIN — PANTOPRAZOLE SODIUM 40 MG: 40 INJECTION, POWDER, FOR SOLUTION INTRAVENOUS at 08:06

## 2017-11-25 RX ADMIN — INSULIN LISPRO 4 UNITS: 100 INJECTION, SOLUTION INTRAVENOUS; SUBCUTANEOUS at 17:44

## 2017-11-25 RX ADMIN — INSULIN LISPRO 2 UNITS: 100 INJECTION, SOLUTION INTRAVENOUS; SUBCUTANEOUS at 00:17

## 2017-11-25 RX ADMIN — INSULIN LISPRO 1 UNITS: 100 INJECTION, SOLUTION INTRAVENOUS; SUBCUTANEOUS at 06:22

## 2017-11-25 RX ADMIN — INSULIN LISPRO 2 UNITS: 100 INJECTION, SOLUTION INTRAVENOUS; SUBCUTANEOUS at 12:12

## 2017-11-25 NOTE — PROGRESS NOTES
Orthopedics   Abelino Du 80 y o  male MRN: [id number]  Unit/Bed#: ICU 14-01    Subjective:  80 y o male s/p revision right MAREN at [facility] with thigh pain after TPA administration for stroke  No events overnight  Thigh pain improved  Labs:    0  Lab Value Date/Time   HCT 24 8 (L) 11/25/2017 0428   HCT  11/25/2017 0011   Comment:   Specimen clotted  This is a corrected result  Previous result was 26 6 % on 11/25/2017 at 0025 EST   HCT 28 1 (L) 11/24/2017 2047   HGB 8 9 (L) 11/25/2017 0428   HGB  11/25/2017 0011   Comment:   Specimen clotted , requested reorder and recollect  This is a corrected result   Previous result was 9 5 g/dL on 11/25/2017 at 0025 EST   HGB 10 0 (L) 11/24/2017 2047   INR 1 09 11/24/2017 1519   WBC 8 65 11/25/2017 0428   WBC 12 02 (H) 11/24/2017 1740   WBC 11 26 (H) 11/24/2017 1518   ESR 30 (H) 06/01/2016 1414   CRP 7 8 (H) 06/01/2016 1414       Meds:    Current Facility-Administered Medications:     acetaminophen (TYLENOL) tablet 650 mg, 650 mg, Oral, Q6H PRN, Yaima Cabrera DO    fentanyl citrate (PF) 100 MCG/2ML 25 mcg, 25 mcg, Intravenous, Q2H PRN, Freddy Prasad MD, 25 mcg at 11/24/17 1951    insulin lispro (HumaLOG) 100 units/mL subcutaneous injection 1-5 Units, 1-5 Units, Subcutaneous, Q6H [address], 1 Units at 11/25/17 0622 **AND** Fingerstick Glucose (POCT), , , Q6H, Iva Olmstead DO    iohexol (OMNIPAQUE) 350 MG/ML injection (MULTI-DOSE) 85 mL, 85 mL, Intravenous, Once in imaging, JALEEL Bertrand    lidocaine (LIDODERM) 5 % patch 1 patch, 1 patch, Transdermal, Daily, Yaima Cabrera DO    metoprolol (LOPRESSOR) injection 5 mg, 5 mg, Intravenous, Q6H PRN, JALEEL Bertrand, 5 mg at 11/24/17 1804    ondansetron (ZOFRAN) injection 4 mg, 4 mg, Intravenous, Q4H PRN, Yaima Mote, DO    oxyCODONE (ROXICODONE) IR tablet 2 5 mg, 2 5 mg, Oral, Q4H PRN, Yaima Mote, DO    Blood Culture:   No results found for: BLOODCX    Wound Culture: No results found for: WOUNDCULT    Ins and Outs:  I/O last 24 hours: In: -   Out: 325 [Urine:325]          Physical Exam:   /62   Pulse 74   Temp 99 °F (37 2 °C) (Oral)   Resp (!) 23   Wt 106 kg (233 lb 4 oz)   SpO2 100%   BMI 32 99 kg/m²   Gen: Resting comfortably  Musculoskeletal: right lower extremity  · Right hip dressing c/d/i  · Thigh and gluteal muscles less swollen  · No pain with passive hip/knee flexion, extension, ab/adduction  · SILT s/s/sp/dp/t    · +fhl/ehl/ta/gsc  · Toes wwp    _*_*_*_*_*_*_*_*_*_*_*_*_*_*_*_*_*_*_*_*_*_*_*_*_*_*_*_*_*_*_*_*_*_*_*_*_*_*_*_*_*    Assessment:  81 y o male s/p revision right MAREN at [facility] with thigh pain after TPA administration for stroke  Low concern for compartment syndrome     Plan:   · Continue compartment and NV checks  · From our standpoint pt may be WBAT to RLE  Strongly recommend contacting primary orthopaedic surgeon to get official weight bearing and therapy recommendations as we do not have access to her preop records or x rays      · Please contact primary surgeon to notify of pts condition  · Critical care per ICU team  · Pain control    Rasheeda Iqbal MD

## 2017-11-25 NOTE — PROGRESS NOTES
Progress Note - Critical Care   Clement Osgood 80 y o  male MRN: [id number]  Unit/Bed#: ICU 14 Encounter: [id number]    Assessment: 80 Y/OM with recent right hip arthroplasty revision presented with acute CVA now s/p TPA    Plan:     Neuro:   Acute right CVA s/p TPA  -continues to have LUE weakness with some left facial droop  -currently NPO, awaiting speech/swallow eval  -post TPA CTH today at 10am, MRI also pending  -follow-up Neurology recommendations  -HbA1c 7 2, continue ISS for euglycemia  -Chol 92, , HDL 28, LDL 43  -continue off of AP/AC until repeat CTH to ensure no hemorrhagic conversion, if CTH negative start ASA, continue lipitor  -Echo pending      CV:   HTN  -allow permissive HTN to  in setting of acute CVA  -continue to hold home Norvasc, Toprol, Lisinopril  -continue Labetalol PRN SBP >180     Lung:   Continue to encourage deep breathing and cough, ISS, aggressive pulmonary toilet     GI:   Zofran for nausea  Continue Protonix while NPO     FEN:   NPO for speech/swallow eval  Continue Isolyte 75cc/hr while NPO     :   Monitor strict I/Os     ID:   Afebrile with no leukocytosis, monitor clinically, no infectious concerns at this time     Heme:   Acute blood loss anemia 2/2 recent right hip arthroplasty revision  -concern for hemorrhage into surgical site s/p TPA, hemoglobin has remained stable s/p 4U PRBC yesterday prior to arrival at [facility]  -Hb currently 8 9, will continue to trend Q8H, next recheck 1200 today     Endo:   DM-II  -HbA1c 7 2  -holding home glyburide while IP, continue coverage with ISS, currently algorithm 2, will increase to algorithm 3 as remains slightly high in 180s                Msk/Skin:   Recent right MAREN revision at [facility]   -POD #5  -thigh moderately tense with clean dry dressings today, neurovascular in tact, continue Q2H neuro vascular checks   -pain well controlled with Tylenol for mild pain, Roxicodone 2 5mg moderate and Fentanyl 25mcg breakthrough, used one time overnight  -follow-up Ortho recs     Disposition: Continue ICU care for frequent Neurovascular checks, f/u CTH, MRI    HPI/24hr events: Patient seen and examined at the bedside where he is resting comfortably  He reports that he slept well overnight and his pain is well controlled  He is moving all fours and reports no concerns today  Looking forward to being able to eat today  Review of Systems   Constitutional: Negative for chills and fever  HENT: Negative for trouble swallowing  Respiratory: Negative for cough and shortness of breath  Cardiovascular: Positive for leg swelling  Negative for chest pain and palpitations  Gastrointestinal: Negative for abdominal distention, nausea and vomiting  Musculoskeletal: Positive for arthralgias  Skin: Negative  Neurological: Positive for facial asymmetry  Psychiatric/Behavioral: Negative  All other systems reviewed and are negative  Physical Exam:   Physical Exam   Constitutional: He is oriented to person, place, and time  He appears well-developed and well-nourished  HENT:   Head: Normocephalic  Mouth/Throat: No oropharyngeal exudate  Eyes: Pupils are equal, round, and reactive to light  No scleral icterus  Neck: Neck supple  No JVD present  No tracheal deviation present  Cardiovascular: Normal rate and regular rhythm  Murmur (systolic murmur) heard  Pulmonary/Chest: Effort normal and breath sounds normal  No respiratory distress  Abdominal: Soft  Bowel sounds are normal  He exhibits no distension  There is no guarding  Musculoskeletal: He exhibits edema and tenderness  Right thigh moderately tense, surgical dressings clean and dry   Neurological: He is alert and oriented to person, place, and time  GCS eye subscore is 4  GCS verbal subscore is 5  GCS motor subscore is 6  Left facial droop, LUE strength improved today 3-4/5, LLE 4/5   Skin: Skin is warm and dry     Psychiatric: He has a normal mood and affect  Vitals:    17 0245 17 0345 17 0445 17 0545   BP: 124/59 133/56 159/58 141/62   Pulse: 78 74 78 74   Resp: 14 12 21 (!) 23   Temp:  99 °F (37 2 °C)     TempSrc:  Oral     SpO2: 98% 98%  100%   Weight:         Temperature:   Temp (24hrs), Av °F (36 7 °C), Min:96 °F (35 6 °C), Max:99 3 °F (37 4 °C)    Current: Temperature: 99 °F (37 2 °C)    Weights:   IBW: -88 kg    Body mass index is 32 99 kg/m²  Weight (last 2 days)     Date/Time   Weight    17 1734  106 (233 25)            Hemodynamic Monitoring:  N/A     Non-Invasive/Invasive Ventilation Settings:  Respiratory    Lab Data (Last 4 hours)    None         O2/Vent Data (Last 4 hours)    None              No results found for: PHART, JOC1RFJ, PO2ART, APL7EEI, M5SIKSMW, BEART, SOURCE  SpO2: SpO2: 100 %    Intake and Outputs:  I/O        0701 -  0700  0701 -  0700    Urine (mL/kg/hr)  325    Total Output   325    Net   -325     Nutrition:        Diet Orders            Start     Ordered    17  Diet NPO  Diet effective now     Question Answer Comment   Diet Type NPO    RD to adjust diet per protocol? No        17 173        Labs:     Results from last 7 days  Lab Units 17  0428 17  2047 17  1740 17  1518  17  0620   WBC Thousand/uL 8 65  --  12 02* 11 26*  < > 8 45   HEMOGLOBIN g/dL 8 9* 10 0* 10 9* 8 3*  < > 8 6*   I STAT HEMOGLOBIN   --   --   --   --   < >  --    HEMATOCRIT % 24 8* 28 1* 31 6* 24 3*  < > 25 7*   PLATELETS Thousands/uL 134*  --  123* 136*  < > 129*   NEUTROS PCT % 76*  --   --   --   --  77*   MONOS PCT % 10  --   --   --   --  8   < > = values in this interval not displayed     Results from last 7 days  Lab Units 17  0428 17  1013 17  1009 17  0620   SODIUM mmol/L 135*  --  134* 136   POTASSIUM mmol/L 4 3  --  4 4 4 0   CHLORIDE mmol/L 103  --  100 104   CO2 mmol/L 25  --  28 25   BUN mg/dL 35*  --  29* 28* CREATININE mg/dL 1 50*  --  1 52* 1 47*   CALCIUM mg/dL 7 8*  --  8 8 8 9   TOTAL PROTEIN g/dL 5 4*  --   --  6 2*   BILIRUBIN TOTAL mg/dL 1 27*  --   --  0 40   ALK PHOS U/L 33*  --   --  40*   ALT U/L 13  --   --  14   AST U/L 26  --   --  30   GLUCOSE RANDOM mg/dL 172*  --  176* 152*   GLUCOSE, ISTAT mg/dl  --  171*  --   --        Results from last 7 days  Lab Units 11/25/17  0428   MAGNESIUM mg/dL 2 1       Results from last 7 days  Lab Units 11/25/17  0428   PHOSPHORUS mg/dL 2 9        Results from last 7 days  Lab Units 11/24/17  1519 11/24/17  1009   INR  1 09 1 07   PTT seconds 31 44*           0  Lab Value Date/Time   TROPONINI 0 08 (H) 11/25/2017 0011   TROPONINI 0 05 (H) 11/24/2017 2047   TROPONINI 0 04 11/24/2017 1741   TROPONINI 0 03 11/24/2017 1519   TROPONINI 0 03 11/24/2017 1429   TROPONINI 0 03 11/24/2017 1009       Imaging:  No new imaging overnight     EKG: telemetry reviewed    Micro:  No results found for: Nikita Slice, WOUNDCULT, SPUTUMCULTUR    Allergies: Allergies   Allergen Reactions    Celecoxib     Hydromorphone     Pravastatin Hives     Medications:   Scheduled Meds:  insulin lispro 1-5 Units Subcutaneous Q6H SACHIN   lidocaine 1 patch Transdermal Daily     Continuous Infusions:   PRN Meds:    acetaminophen 650 mg Q6H PRN   fentanyl citrate (PF) 25 mcg Q2H PRN   iohexol 85 mL Once in imaging   metoprolol 5 mg Q6H PRN   ondansetron 4 mg Q4H PRN   oxyCODONE 2 5 mg Q4H PRN     VTE Pharmacologic Prophylaxis: Venodyne contraindicated due to s/p TPA  VTE Mechanical Prophylaxis: sequential compression device    Invasive lines and devices: Invasive Devices     Peripheral Intravenous Line            Peripheral IV 11/24/17 Left Antecubital less than 1 day    Peripheral IV 11/24/17 Right Antecubital less than 1 day    Peripheral IV 11/24/17 Right Arm less than 1 day                 Code Status: Level 1 - Full Code  Khris ESTEVEZ    Internal Medicine PGY-3  11/25/2017 7:22 AM

## 2017-11-25 NOTE — PROGRESS NOTES
Progress Note - Neurology   Charly Renee 80 y o  male MRN: [id number]  Unit/Bed#: ICU 14 Encounter: [id number]    Assessment/Plan:  68-year-old man who presented as a stroke alert on at [facility] yesterday a m  with symptoms of left upper extremity weakness left facial droop and dysarthria  Context was complicated by recent right hip replacement 4 days prior  Ultimately decision was made to administer IV tPA for acute ischemic stroke  IV tPA was administered at 12:32 a m  Patient was also transfused 1 unit P RBC  Patient subsequently developed increasing pain and swelling at the site of the right hip replaced  Patient was subsequently transferred to [facility] for both acute stroke care as well as availability of orthopedics  - currently patient has 5-/ 5 strength of the left upper extremity, without any clear dysarthria on exam   Sensation is intact and symmetric  Family members in the room deny any gross dysarthria  There is a slight decreased nasolabial fold and widening of the palpebral fissure on the left  - vascular duplex scan of the bilateral upper extremities performed on at 13:40 on 11/24 2017 demonstrated an acute occlusive DVT in a single branch of the brachial vein extending from the upper to mid arm   - currently holding antiplatelets secondary to concern for bleeding at postop site  - repeat CT head as per IV tPA protocol was unremarkable for any acute intracranial pathology  - would recommend pursuing MRI brain to evaluate potential stroke burden  - recommend maps greater than 100, SBP less than 210 for now  - will follow result of MRI  Subjective:   I feel okay    ROS:  Review of Systems   Constitutional: Negative  HENT: Negative for hearing loss  Eyes: Negative for photophobia and visual disturbance  Respiratory: Negative for wheezing  Cardiovascular: Negative for chest pain and palpitations  Genitourinary: Negative for dysuria and urgency     Neurological: Negative for dizziness, , light-headedness, numbness and headaches  Positive for weakness left arm but improved  All other systems reviewed are negative  Vitals: Blood pressure 160/63, pulse 69, temperature 99 °F (37 2 °C), resp  rate 14, height 5' 10 5" (1 791 m), weight 106 kg (233 lb 4 oz), SpO2 98 %  ,Body mass index is 32 99 kg/m²  Physical Exam:   Physical Exam   Constitutional:  appears well-developed and well-nourished  HENT:  Unremarkable  Head: Normocephalic and atraumatic  Eyes: EOM are normal  Pupils are equal, round, and reactive to light  Cardiovascular: Normal rate, regular rhythm and normal heart sounds     No murmur heard  Pulmonary/Chest: Breath sounds normal     Neurologic Exam   Mental Status    Oriented to person, place, and time  Level of consciousness: alert  Normal comprehension  Cranial Nerves    Visual fields full to confrontation  Pupils are equal, round, and reactive to light  Extraocular motions are normal    Nystagmus: none   Slight flattening of the nasolabial fold and widened palpebral fissure on the left  (Subtle)  Tongue, palate, uvula midline  Motor Exam   5-/5 strength in the left upper extremity  5/5 strength in the remaining 3 extremities  Muscle bulk: normal  Overall muscle tone: normal  Sensory Exam   Sensation intact to light touch and temp  Gait, Coordination, and Reflexes   DTR's symmetric  No gross ataxia  Lab, Imaging and other studies: I have personally reviewed pertinent reports      VTE Prophylaxis: Sequential compression device (Venodyne)

## 2017-11-25 NOTE — SPEECH THERAPY NOTE
Speech Language/Pathology  Speech/Language Pathology  Assessment    Patient Name: Rosa Stern  YXLHD'E Date: 11/25/2017     Problem List  Patient Active Problem List   Diagnosis    Stroke ([facility] )    HTN (hypertension)    DM2 (diabetes mellitus, type 2) ([place] [address] )    HLD (hyperlipidemia)    CAD (coronary artery disease)    Postoperative anemia    Right hip pain    Acute deep vein thrombosis (DVT) of brachial vein of left upper extremity (HCC)    Coagulopathy (HCC)     Past Medical History  Past Medical History:   Diagnosis Date    CAD (coronary artery disease)     DM2 (diabetes mellitus, type 2) ([place] [address] )     HLD (hyperlipidemia)     HTN (hypertension)      Past Surgical History  Past Surgical History:   Procedure Laterality Date    HIP HARDWARE REMOVAL      TOTAL HIP ARTHROPLASTY Right         11/25/17 0902   Patient Information   Current Medical Pt is a 81 y/o male admitted to [facility] on 11/24/2017 with new R CVA  Pt was being seen by [facility] after recent hip replacement where he was note to have L facial droop and dysarthria  CT was originally negative and TPA was administered  PT then c/o headache and new hemorrahge was found  Request was made for swallowing evaluation  Social/Educational/Vocational Hx Home   Swallow Information   Current Risks for Dysphagia & Aspiration Dysarthria   Current Symptoms/Concerns Other (Comment)  (facial droop)   Current Diet NPO   Baseline Diet Regular; Thin liquids   Baseline Assessment   Behavior/Cognition Alert; Cooperative; Interactive   Speech/Language Status Mildly confused; not oriented to date  Oriented to person, place and situation  Voice is strong  Very minimal dysarthria     Patient Positioning Upright in bed  (c/o pain when fully upright due to R hip)   Swallow Mechanism Exam   Labial Symmetry Abnormal symmetry left   Labial Strength WFL   Labial ROM WFL   Facial Symmetry WFL   Facial Strength WFL   Facial ROM WFL   Lingual Symmetry WFL   Lingual Strength WFL   Lingual ROM WFL   Velum WFL   Mandible WFL   Dentition Adequate   Volitional Cough Strong   Consistencies Assessed and Performance   Materials Adminstered Comment 4 oz applesauce, 2 lana crackers, 1 piece of toast with butter, 8 oz thin water by cup and straw   Oral Stage Mild impaired   Oral Stage Comment Bolus retrieval was [facility] with adequate lip seal  Mastication was mildly prolonged with adequate breakdown and oral clearance  Phargngeal Stage Mild impaired   Pharyngeal Stage Comment Swallows were prompt but mildly delayed at times  Hyolaryngeal rise appeared adequate  Throat clear observed with initial sip of thin liquids however this appeared to be an isolated incident as no other s/s of aspiration were noted with additional 8 oz of thin liquids by cup or straw even with successive sips  Voice remained clear post all swallows  Pt had one strong throat clear with toast when talking while eating  Swallow Mechanics Mild delayed   Esophageal Concerns Other (Comment)  (delayed belching and occasional throat clearing after eval)   Summary   Swallow Summary Pt presents with mild oropharyngeal dysphagia as demonstrated by mildly prolonged mastication and occasional mild delay in swallow initiation  Pt had immediate throat clear x1 with thin liquids however this appeared to be an isolated incident  He had 1 throat clear with toast when talking however when cued not to talk no other s/s of dypshagia noted  Pt was mildly impulsive eating at a rapid rate to start requiring cues for slower rate  At this time, SLP is recommending a dysphagia level 3 diet and thin liquids with supervision to start to assure slow rate of eating  Speech to follow,  Recommendations   Risk for Aspiration Mild   Recommendations Consider oral diet; Dysphagia treatment   Diet Solid Recommendation Level 3 Dysphagia/ advanced/ soft to chew   Diet Liquid Recommendation Thin liquid   Recommended Form of Meds Whole; As tolerated   General Precautions Aspiration precautions;Upright as possible for all oral intake;Remain upright for 45 mins after meals; Supervision with meals   Compensatory Swallowing Strategies External pacing   Further Evaluations (GI if indicated)   Results Reviewed with RN;PT/Family/Caregiver   Treatment Recommendations   Follow up treatments Strategy training;Continue clinical assessment; Assure diet tolerance; Patient/family education   Dysphagia Goals Patient will tolerate recommended diet without observed clinical signs of oral/pharngeal dysphagia; Patient will tolerate advanced diet with no signs of oral or pharngeal dysphagia   Speech Therapy Prognosis   Prognosis Good   Prognosis Considerations Patient Participation Level; Availability of Services; Potential;Previous Level of Function;Severity of Impairments

## 2017-11-25 NOTE — PLAN OF CARE
Problem: SLP ADULT - SWALLOWING, IMPAIRED  Goal: Initial SLP swallow eval performed  Outcome: Completed Date Met: 11/25/17

## 2017-11-25 NOTE — SOCIAL WORK
CM met with pt to complete assessment  Pt presented as alert and oriented  Pt lives with significant other Bushra in mobile home with 4 DILMA  Pt reported to be IADLs PTA and denied hx of VNA  Pt reported that he has used a crutch  PCP is [facility] and preferred pharmacy is [facility]  Pt denied hx of psych/substance abuse admission  Pt reported that son Parmjit Molina is POA, however no docs on file  CM reviewed d/c planning process including the following: identifying help at home, patient preference for d/c planning needs, Discharge Lounge, [facility] Meds to Bed program, availability of treatment team to discuss questions or concerns patient and/or family may have regarding understanding medications and recognizing signs and symptoms once discharged  CM also encouraged patient to follow up with all recommended appointments after discharge  Patient advised of importance for patient and family to participate in managing patients medical well being

## 2017-11-25 NOTE — PLAN OF CARE
Problem: Prexisting or High Potential for Compromised Skin Integrity  Goal: Skin integrity is maintained or improved  INTERVENTIONS:  - Identify patients at risk for skin breakdown  - Assess and monitor skin integrity  - Assess and monitor nutrition and hydration status  - Monitor labs (i e  albumin)  - Assess for incontinence   - Turn and reposition patient  - Assist with mobility/ambulation  - Relieve pressure over bony prominences  - Avoid friction and shearing  - Provide appropriate hygiene as needed including keeping skin clean and dry  - Evaluate need for skin moisturizer/barrier cream  - Collaborate with interdisciplinary team (i e  Nutrition, Rehabilitation, etc )   - Patient/family teaching   Outcome: Progressing      Problem: Potential for Falls  Goal: Patient will remain free of falls  INTERVENTIONS:  - Assess patient frequently for physical needs  -  Identify cognitive and physical deficits and behaviors that affect risk of falls    -  [place] fall precautions as indicated by assessment   - Educate patient/family on patient safety including physical limitations  - Instruct patient to call for assistance with activity based on assessment  - Modify environment to reduce risk of injury  - Consider OT/PT consult to assist with strengthening/mobility   Outcome: Progressing      Problem: PAIN - ADULT  Goal: Verbalizes/displays adequate comfort level or baseline comfort level  Interventions:  - Encourage patient to monitor pain and request assistance  - Assess pain using appropriate pain scale  - Administer analgesics based on type and severity of pain and evaluate response  - Implement non-pharmacological measures as appropriate and evaluate response  - Consider cultural and social influences on pain and pain management  - Notify physician/advanced practitioner if interventions unsuccessful or patient reports new pain  Outcome: Progressing      Problem: SAFETY ADULT  Goal: Maintain or return to baseline ADL function  INTERVENTIONS:  -  Assess patient's ability to carry out ADLs; assess patient's baseline for ADL function and identify physical deficits which impact ability to perform ADLs (bathing, care of mouth/teeth, toileting, grooming, dressing, etc )  - Assess/evaluate cause of self-care deficits   - Assess range of motion  - Assess patient's mobility; develop plan if impaired  - Assess patient's need for assistive devices and provide as appropriate  - Encourage maximum independence but intervene and supervise when necessary  ¯ Involve family in performance of ADLs  ¯ Assess for home care needs following discharge   ¯ Request OT consult to assist with ADL evaluation and planning for discharge  ¯ Provide patient education as appropriate  Outcome: Progressing    Goal: Maintain or return mobility status to optimal level  INTERVENTIONS:  - Assess patient's baseline mobility status (ambulation, transfers, stairs, etc )    - Identify cognitive and physical deficits and behaviors that affect mobility  - Identify mobility aids required to assist with transfers and/or ambulation (gait belt, sit-to-stand, lift, walker, cane, etc )  - [place] fall precautions as indicated by assessment  - Record patient progress and toleration of activity level on Mobility SBAR; progress patient to next Phase/Stage  - Instruct patient to call for assistance with activity based on assessment  - Request Rehabilitation consult to assist with strengthening/weightbearing, etc   Outcome: Progressing

## 2017-11-26 ENCOUNTER — APPOINTMENT (INPATIENT)
Dept: NON INVASIVE DIAGNOSTICS | Facility: HOSPITAL | Age: 82
DRG: 553 | End: 2017-11-26
Attending: INTERNAL MEDICINE
Payer: MEDICARE

## 2017-11-26 LAB
ANION GAP SERPL CALCULATED.3IONS-SCNC: 7 MMOL/L (ref 4–13)
ATRIAL RATE: 69 BPM
ATRIAL RATE: 74 BPM
BASOPHILS # BLD AUTO: 0.02 THOUSANDS/ΜL (ref 0–0.1)
BASOPHILS NFR BLD AUTO: 0 % (ref 0–1)
BUN SERPL-MCNC: 45 MG/DL (ref 5–25)
CALCIUM SERPL-MCNC: 7.8 MG/DL (ref 8.3–10.1)
CHLORIDE SERPL-SCNC: 102 MMOL/L (ref 100–108)
CO2 SERPL-SCNC: 26 MMOL/L (ref 21–32)
CREAT SERPL-MCNC: 1.87 MG/DL (ref 0.6–1.3)
EOSINOPHIL # BLD AUTO: 0.24 THOUSAND/ΜL (ref 0–0.61)
EOSINOPHIL NFR BLD AUTO: 3 % (ref 0–6)
ERYTHROCYTE [DISTWIDTH] IN BLOOD BY AUTOMATED COUNT: 14.7 % (ref 11.6–15.1)
GFR SERPL CREATININE-BSD FRML MDRD: 33 ML/MIN/1.73SQ M
GLUCOSE SERPL-MCNC: 143 MG/DL (ref 65–140)
GLUCOSE SERPL-MCNC: 160 MG/DL (ref 65–140)
GLUCOSE SERPL-MCNC: 204 MG/DL (ref 65–140)
GLUCOSE SERPL-MCNC: 229 MG/DL (ref 65–140)
GLUCOSE SERPL-MCNC: 230 MG/DL (ref 65–140)
GLUCOSE SERPL-MCNC: 363 MG/DL (ref 65–140)
HCT VFR BLD AUTO: 23.1 % (ref 36.5–49.3)
HGB BLD-MCNC: 8 G/DL (ref 12–17)
LYMPHOCYTES # BLD AUTO: 1.31 THOUSANDS/ΜL (ref 0.6–4.47)
LYMPHOCYTES NFR BLD AUTO: 16 % (ref 14–44)
MCH RBC QN AUTO: 29.7 PG (ref 26.8–34.3)
MCHC RBC AUTO-ENTMCNC: 34.6 G/DL (ref 31.4–37.4)
MCV RBC AUTO: 86 FL (ref 82–98)
MONOCYTES # BLD AUTO: 0.97 THOUSAND/ΜL (ref 0.17–1.22)
MONOCYTES NFR BLD AUTO: 12 % (ref 4–12)
NEUTROPHILS # BLD AUTO: 5.6 THOUSANDS/ΜL (ref 1.85–7.62)
NEUTS SEG NFR BLD AUTO: 69 % (ref 43–75)
NRBC BLD AUTO-RTO: 0 /100 WBCS
P AXIS: 31 DEGREES
P AXIS: 64 DEGREES
PLATELET # BLD AUTO: 156 THOUSANDS/UL (ref 149–390)
PMV BLD AUTO: 10.2 FL (ref 8.9–12.7)
POTASSIUM SERPL-SCNC: 4.2 MMOL/L (ref 3.5–5.3)
PR INTERVAL: 192 MS
PR INTERVAL: 200 MS
QRS AXIS: 42 DEGREES
QRS AXIS: 72 DEGREES
QRSD INTERVAL: 146 MS
QRSD INTERVAL: 154 MS
QT INTERVAL: 450 MS
QT INTERVAL: 452 MS
QTC INTERVAL: 484 MS
QTC INTERVAL: 499 MS
RBC # BLD AUTO: 2.69 MILLION/UL (ref 3.88–5.62)
SODIUM SERPL-SCNC: 135 MMOL/L (ref 136–145)
T WAVE AXIS: -4 DEGREES
T WAVE AXIS: 32 DEGREES
TROPONIN I SERPL-MCNC: 0.03 NG/ML
TROPONIN I SERPL-MCNC: 0.04 NG/ML
TROPONIN I SERPL-MCNC: 0.04 NG/ML
VENTRICULAR RATE: 69 BPM
VENTRICULAR RATE: 74 BPM
WBC # BLD AUTO: 8.18 THOUSAND/UL (ref 4.31–10.16)

## 2017-11-26 PROCEDURE — 80048 BASIC METABOLIC PNL TOTAL CA: CPT | Performed by: INTERNAL MEDICINE

## 2017-11-26 PROCEDURE — 82948 REAGENT STRIP/BLOOD GLUCOSE: CPT

## 2017-11-26 PROCEDURE — 97163 PT EVAL HIGH COMPLEX 45 MIN: CPT

## 2017-11-26 PROCEDURE — 93005 ELECTROCARDIOGRAM TRACING: CPT

## 2017-11-26 PROCEDURE — G8978 MOBILITY CURRENT STATUS: HCPCS

## 2017-11-26 PROCEDURE — G8979 MOBILITY GOAL STATUS: HCPCS

## 2017-11-26 PROCEDURE — G8988 SELF CARE GOAL STATUS: HCPCS

## 2017-11-26 PROCEDURE — G8987 SELF CARE CURRENT STATUS: HCPCS

## 2017-11-26 PROCEDURE — 84484 ASSAY OF TROPONIN QUANT: CPT | Performed by: INTERNAL MEDICINE

## 2017-11-26 PROCEDURE — 85025 COMPLETE CBC W/AUTO DIFF WBC: CPT | Performed by: INTERNAL MEDICINE

## 2017-11-26 PROCEDURE — 97167 OT EVAL HIGH COMPLEX 60 MIN: CPT

## 2017-11-26 RX ORDER — POLYETHYLENE GLYCOL 3350 17 G/17G
17 POWDER, FOR SOLUTION ORAL DAILY PRN
Status: DISCONTINUED | OUTPATIENT
Start: 2017-11-26 | End: 2017-12-05 | Stop reason: HOSPADM

## 2017-11-26 RX ORDER — ASPIRIN 81 MG/1
81 TABLET, CHEWABLE ORAL DAILY
Status: DISCONTINUED | OUTPATIENT
Start: 2017-11-26 | End: 2017-11-28

## 2017-11-26 RX ORDER — SENNOSIDES 8.6 MG
1 TABLET ORAL
Status: DISCONTINUED | OUTPATIENT
Start: 2017-11-26 | End: 2017-12-03

## 2017-11-26 RX ORDER — ASPIRIN 81 MG/1
81 TABLET ORAL DAILY
Status: DISCONTINUED | OUTPATIENT
Start: 2017-11-26 | End: 2017-11-26

## 2017-11-26 RX ORDER — LISINOPRIL 20 MG/1
40 TABLET ORAL DAILY
Status: DISCONTINUED | OUTPATIENT
Start: 2017-11-26 | End: 2017-11-26

## 2017-11-26 RX ORDER — CLOPIDOGREL BISULFATE 75 MG/1
75 TABLET ORAL DAILY
Status: DISCONTINUED | OUTPATIENT
Start: 2017-11-26 | End: 2017-11-28

## 2017-11-26 RX ORDER — METOPROLOL SUCCINATE 25 MG/1
25 TABLET, EXTENDED RELEASE ORAL EVERY 12 HOURS
Status: DISCONTINUED | OUTPATIENT
Start: 2017-11-26 | End: 2017-11-26

## 2017-11-26 RX ORDER — AMLODIPINE BESYLATE 10 MG/1
10 TABLET ORAL DAILY
Status: DISCONTINUED | OUTPATIENT
Start: 2017-11-26 | End: 2017-12-05 | Stop reason: HOSPADM

## 2017-11-26 RX ORDER — OXYCODONE HYDROCHLORIDE AND ACETAMINOPHEN 5; 325 MG/1; MG/1
2 TABLET ORAL EVERY 4 HOURS PRN
Status: DISCONTINUED | OUTPATIENT
Start: 2017-11-26 | End: 2017-11-27

## 2017-11-26 RX ADMIN — AMLODIPINE BESYLATE 10 MG: 10 TABLET ORAL at 11:30

## 2017-11-26 RX ADMIN — METOPROLOL TARTRATE 25 MG: 25 TABLET ORAL at 21:14

## 2017-11-26 RX ADMIN — METOPROLOL TARTRATE 25 MG: 25 TABLET ORAL at 08:06

## 2017-11-26 RX ADMIN — INSULIN LISPRO 3 UNITS: 100 INJECTION, SOLUTION INTRAVENOUS; SUBCUTANEOUS at 21:18

## 2017-11-26 RX ADMIN — INSULIN LISPRO 2 UNITS: 100 INJECTION, SOLUTION INTRAVENOUS; SUBCUTANEOUS at 00:17

## 2017-11-26 RX ADMIN — SENNOSIDES 8.6 MG: 8.6 TABLET, FILM COATED ORAL at 21:14

## 2017-11-26 RX ADMIN — METOPROLOL TARTRATE 25 MG: 25 TABLET ORAL at 00:19

## 2017-11-26 RX ADMIN — INSULIN LISPRO 4 UNITS: 100 INJECTION, SOLUTION INTRAVENOUS; SUBCUTANEOUS at 11:31

## 2017-11-26 RX ADMIN — OXYCODONE HYDROCHLORIDE 5 MG: 5 TABLET ORAL at 08:06

## 2017-11-26 RX ADMIN — CLOPIDOGREL BISULFATE 75 MG: 75 TABLET ORAL at 11:30

## 2017-11-26 RX ADMIN — ENOXAPARIN SODIUM 40 MG: 40 INJECTION SUBCUTANEOUS at 13:20

## 2017-11-26 RX ADMIN — INSULIN LISPRO 10 UNITS: 100 INJECTION, SOLUTION INTRAVENOUS; SUBCUTANEOUS at 17:36

## 2017-11-26 RX ADMIN — ASPIRIN 81 MG 81 MG: 81 TABLET ORAL at 16:36

## 2017-11-26 RX ADMIN — HYDROMORPHONE HYDROCHLORIDE 0.5 MG: 1 INJECTION, SOLUTION INTRAMUSCULAR; INTRAVENOUS; SUBCUTANEOUS at 10:14

## 2017-11-26 NOTE — PROGRESS NOTES
Progress Note - Critical Care   Muna Sims 80 y o  male MRN: [id number]  Unit/Bed#: ICU 14 Encounter: [id number]    Assessment:  77-year-old male with right recent hip arthroplasty revision presented with acute CVA status post tPA    Plan:               Neuro:   Acute right CVA status post tPA  -left upper extremity weakness and left facial droop improved  Overall neurologic exam improved  -MRI brain:  No acute disease  Generalized volume loss  Moderate degree of chronic small vessel disease  -CT head:  No acute intracranial abnormality  Stable appearance of brain  Chronic small vessel disease  -start aspirin 81 mg daily  -continue Lipitor  -echo:  Ejection fraction 55%  No regional wall motion abnormalities  Moderate calcification of aortic valve  No left ventricular thrombus                 CV:   Hypertension  -resume home Norvasc, metoprolol, lisinopril  -continue labetalol p r n  for systolic blood pressure greater than 180 mmHg                 Lung:   No acute issues  Continue to encourage incentive spirometry                 Gi:  No acute issues  Continue Zofran for nausea                 FEN:  Patient passed speech swallow eval  Dysphagia level 3 diet  Electrolytes within normal limits, replete as needed                Gu:  No acute issues  Creatinine at baseline                 ID:   Patient afebrile without leukocytosis  Continue to monitor                   Heme:   Acute blood loss anemia secondary to right hip arthroplasty revision  -concern for hemorrhage into the surgical site status post tPA and 4 units of packed red blood cell upon arrival to [facility]  -H&H remained stable a 8 0/23 1  -continue to trend  -transfuse if hemoglobin less than 7 0                 Endo:   Type 2 diabetes  -A1c 7 2  -correctional insulin coverage with algorithm 3  -current blood glucose 160                            Msk/Skin:   Recent right hip arthroplasty revision at [facility] [employer]  -postop day 6  -thigh moderately tense with clean dry dressing  -neurovascular intact  -continue q 2 hours neurovascular checks  -pain control with Tylenol for mild pain, oxycodone for moderate pain, fentanyl for breakthrough pain  -patient able to weightbear as tolerated to right lower extremity per Ortho     -[facility] orthopedic records pending  -PT/OT/PMR evaluation pending                 Disposition:  Stable for transfer out of the ICU under step-down level 2 care    Chief Complaint:  Recent right hip arthroplasty revision with acute CVA status post tPA  Right hip and thigh pain  HPI/24hr events:     No events overnight  Patient resting comfortably in no acute distress  Patient only complains of right hip and thigh pain  Denies fever, chills, headaches, lightheadedness, dizziness, visual disturbances, sore throat, chest pain, palpitations, SOB, abdominal pain, nausea, vomiting, or trouble urinating/ defecating  Physical Exam: Physical Exam   Constitutional: He is oriented to person, place, and time  He appears well-developed and well-nourished  No distress  HENT:   Head: Normocephalic and atraumatic  Right Ear: External ear normal    Left Ear: External ear normal    Mouth/Throat: No oropharyngeal exudate  Eyes: Conjunctivae and EOM are normal  Pupils are equal, round, and reactive to light  Right eye exhibits no discharge  Left eye exhibits no discharge  No scleral icterus  Neck: Neck supple  No JVD present  No tracheal deviation present  Cardiovascular: Normal rate, regular rhythm and intact distal pulses  Exam reveals no gallop and no friction rub  Murmur heard  Systolic murmur noted   Pulmonary/Chest: Effort normal and breath sounds normal  No respiratory distress  He has no wheezes  He has no rales  He exhibits no tenderness  Abdominal: Soft  Bowel sounds are normal  He exhibits no distension and no mass  There is no tenderness  There is no rebound and no guarding  Musculoskeletal: He exhibits edema and tenderness  Right thigh moderately tense at surgical site   Neurological: He is alert and oriented to person, place, and time  A cranial nerve deficit is present  No sensory deficit  Mild left facial droop  Significantly improved since presentation  5/5 strength in right upper extremity, left upper extremity, left lower extremity  Patient unable to left right lower extremity due to pain and moderately tense surgical site  Sensations grossly intact   Skin: Skin is warm and dry  Capillary refill takes less than 2 seconds  No rash noted  He is not diaphoretic  No erythema  No pallor  Psychiatric: He has a normal mood and affect  Nursing note and vitals reviewed  Vitals:    17 0000 17 0019 17 0400 17 0700   BP:  167/61 167/66 (!) 178/69   Pulse:  81 66 66   Resp:   (!) 23 19   Temp: (!) 100 6 °F (38 1 °C)  99 3 °F (37 4 °C)    TempSrc:   Oral    SpO2:   98% 95%   Weight:       Height:                 Temperature:   Temp (24hrs), Av 3 °F (37 4 °C), Min:98 7 °F (37 1 °C), Max:100 6 °F (38 1 °C)    Current: Temperature: 99 3 °F (37 4 °C)    Weights:   IBW: 74 15 kg    Body mass index is 32 99 kg/m²  Weight (last 2 days)     Date/Time   Weight    17 0745  106 (233 25)    17 1734  106 (233 25)              Hemodynamic Monitoring:  N/A     Non-Invasive/Invasive Ventilation Settings:  Respiratory    Lab Data (Last 4 hours)    None         O2/Vent Data (Last 4 hours)    None              No results found for: PHART, BXQ6YUV, PO2ART, WWY0JJG, X1NZGLBI, BEART, SOURCE  SpO2: SpO2: 95 %, SpO2 Activity: SpO2 Activity: At Rest, SpO2 Device: O2 Device: None (Room air), Capnography:      Intake and Outputs:  I/O       701 -  0700  07 -  0700 701 -  0700    P  O   660     I V  (mL/kg)  750 (7 1)     Total Intake(mL/kg)  1410 (13 3)     Urine (mL/kg/hr) 325 1100 (0 4)     Total Output 325 1100      Net -325 +310             Unmeasured Urine Occurrence 2 x 104 x         UOP: 1100cc/24hour   Nutrition:        Diet Orders            Start     Ordered    11/25/17 0903  Diet Dysphagia/Modified Consistency; Dysphagia 3-Dental Soft; Dysphagia 3-Dental Soft; Thin Liquid  Diet effective now     Question Answer Comment   Diet Type Dysphagia/Modified Consistency    Dysphagia/Modified Consistency Dysphagia 3-Dental Soft    Other Restriction(s): Dysphagia 3-Dental Soft    Liquid Modifier Thin Liquid    RD to adjust diet per protocol? No        11/25/17 0902            Labs:     Results from last 7 days  Lab Units 11/26/17  0411 11/25/17  1209 11/25/17  0428  11/24/17  1740  11/23/17  0620   WBC Thousand/uL 8 18  --  8 65  --  12 02*  < > 8 45   HEMOGLOBIN g/dL 8 0* 8 7* 8 9*  < > 10 9*  < > 8 6*   I STAT HEMOGLOBIN   --   --   --   --   --   < >  --    HEMATOCRIT % 23 1* 24 8* 24 8*  < > 31 6*  < > 25 7*   PLATELETS Thousands/uL 156  --  134*  --  123*  < > 129*   NEUTROS PCT % 69  --  76*  --   --   --  77*   MONOS PCT % 12  --  10  --   --   --  8   < > = values in this interval not displayed     Results from last 7 days  Lab Units 11/26/17  0411 11/25/17  0428 11/24/17  1013 11/24/17  1009 11/23/17  0620   SODIUM mmol/L 135* 135*  --  134* 136   POTASSIUM mmol/L 4 2 4 3  --  4 4 4 0   CHLORIDE mmol/L 102 103  --  100 104   CO2 mmol/L 26 25  --  28 25   BUN mg/dL 45* 35*  --  29* 28*   CREATININE mg/dL 1 87* 1 50*  --  1 52* 1 47*   CALCIUM mg/dL 7 8* 7 8*  --  8 8 8 9   TOTAL PROTEIN g/dL  --  5 4*  --   --  6 2*   BILIRUBIN TOTAL mg/dL  --  1 27*  --   --  0 40   ALK PHOS U/L  --  33*  --   --  40*   ALT U/L  --  13  --   --  14   AST U/L  --  26  --   --  30   GLUCOSE RANDOM mg/dL 160* 172*  --  176* 152*   GLUCOSE, ISTAT mg/dl  --   --  171*  --   --        Results from last 7 days  Lab Units 11/25/17  0428   MAGNESIUM mg/dL 2 1       Results from last 7 days  Lab Units 11/25/17  0428   PHOSPHORUS mg/dL 2 9        Results from last 7 days  Lab Units 11/24/17  1519 11/24/17  1009   INR  1 09 1 07   PTT seconds 31 44*           0  Lab Value Date/Time   TROPONINI 0 08 (H) 11/25/2017 0011   TROPONINI 0 05 (H) 11/24/2017 2047   TROPONINI 0 04 11/24/2017 1741   TROPONINI 0 03 11/24/2017 1519   TROPONINI 0 03 11/24/2017 1429   TROPONINI 0 03 11/24/2017 1009       Imaging:  I have personally reviewed pertinent reports  EKG:  Telemetry reviewed    Micro:  Lab Results   Component Value Date    WOUNDCULT No growth 11/24/2017       Allergies: Allergies   Allergen Reactions    Celecoxib     Hydromorphone     Pravastatin Hives       Medications:   Scheduled Meds:  insulin lispro 1-6 Units Subcutaneous HS   insulin lispro 2-12 Units Subcutaneous TID AC   metoprolol tartrate 25 mg Oral Q12H [address]     Continuous Infusions:  multi-electrolyte 75 mL/hr Last Rate: Stopped (11/25/17 1539)     PRN Meds:    acetaminophen 650 mg Q6H PRN   fentanyl citrate (PF) 25 mcg Q2H PRN   influenza vaccine 0 5 mL Prior to discharge   iohexol 85 mL Once in imaging   metoprolol 5 mg Q6H PRN   ondansetron 4 mg Q4H PRN   oxyCODONE 5 mg Q4H PRN       VTE Pharmacologic Prophylaxis:  Aspirin  VTE Mechanical Prophylaxis: sequential compression device    Invasive lines and devices: Invasive Devices     Peripheral Intravenous Line            Peripheral IV 11/24/17 Left Antecubital 1 day    Peripheral IV 11/24/17 Right Antecubital 1 day    Peripheral IV 11/24/17 Right Arm 1 day                   Counseling / Coordination of Care  Total Critical Care time spent 30 minutes excluding procedures, teaching and family updates  Code Status: Level 1 - Full Code     Portions of the record may have been created with voice recognition software  Occasional wrong word or "sound a like" substitutions may have occurred due to the inherent limitations of voice recognition software  Read the chart carefully and recognize, using context, where substitutions have occurred       Irving Nena Pratt, DO

## 2017-11-26 NOTE — PROGRESS NOTES
Progress Note - ICU Transfer to Step down/med  surg  - Rosa Stern 80 y o  male MRN: [id number]    Unit/Bed#: ICU 14 Encounter: [id number]      Code Status: Level 1 - Full Code    Date of ICU admission:  11/24/2017    Reason for ICU adm:  CVA status post tPA    Active problems:   Patient Active Problem List   Diagnosis    Stroke ([facility] [address] )    HTN (hypertension)    DM2 (diabetes mellitus, type 2) ([facility] [address] )    HLD (hyperlipidemia)    CAD (coronary artery disease)    Postoperative anemia    Right hip pain    Acute deep vein thrombosis (DVT) of brachial vein of left upper extremity (HCC)    Coagulopathy (HCC)       Summary of clinical course:     Rosa Stern is a 80 y o  male with history of CAD s/p stents, HTN, HLD, DM-II, who is six days post-operative of explantation with reimplantation of right hip hardware for periprosthetic fracture who presented to the [facility] ED on 11/24/2017 with left sided weakness and dysarthria  CTH at that time was negative for acute hemorrhage and tPA was administered  Shortly after the tPA he developed worsening headache for which repeat CTH was completed and revealed no hemorrhage  He did however develop worsening pain and tension of his right thigh concerning for compartment syndrome  His hemoglobin at that point was 8 4 and he was administered 1U PRBC with repeat Hb 8 2 raising the concern for extravasation into the joint  He was transferred to [facility] after an additional 3U of PRBC for orthopedic follow-up as well as continued Neurology follow-up  The patient also had a upper extremity Doppler study which revealed occlusive DVT in a single branch of the brachial veins of the left upper limb extending from the upper arm to mid arm  On arrival he was awake and alert, reported pain in his right hip/thigh and that he is thirsty asking for water  He had slight headache which is significantly improved from earlier in the day   He also continued to have dysarthria and left upper extremity weakness  Patient was admitted to the ICU for close monitoring with neurovascular checks status post tPA with concern for hemorrhage into the surgical site  His repeat head CT showed no acute intracranial abnormalities and his MRI was also negative for any acute intracranial abnormality  Patient was evaluated by Orthopedics and Neurology  Orthopedics deemed that the patient may be able to weightbear as tolerated to the right lower extremity and did not pursue any surgical intervention  Neurology evaluated the patient and initially recommended permissive hypertensive therapy  The following days the patient's neurologic symptoms improved including improvement of his dysarthria and left upper extremity weakness  Neurology re-evaluated the patient and deemed that the patient may have had a TIA versus his left upper extremity weakness could be result of the DVTs  The patient passed his the swallow eval and was transitioned to a dysphagia level 3 diet  He continued to have pain at his right lower extremity surgical site but the tensity of the right lower extremity was improved than at presentation  Throughout his ICU stay the patient's hemoglobin and vitals remained stable and he was afebrile without leukocytosis  He did not require any additional blood transfusions  On 11/26/2017 the patient was deemed stable for transfer out of the ICU to the medical/surgical floor under step-down level 2 care  Specific plan:       Neuro:   Acute right CVA status post tPA  -left upper extremity weakness and left facial droop improved  Overall neurologic exam improved  -MRI brain:  No acute disease  Generalized volume loss  Moderate degree of chronic small vessel disease  -CT head:  No acute intracranial abnormality  Stable appearance of brain  Chronic small vessel disease  -resume aspirin and Plavix  -continue Lipitor  -echo:  Ejection fraction 55%  No regional wall motion abnormalities    Moderate calcification of aortic valve    No left ventricular thrombus                 CV:   Hypertension  -resume home Norvasc, metoprolol  -continue labetalol p r n  for systolic blood pressure greater than 180 mmHg  -hold home lisinopril in setting of elevated creatinine    Coronary artery disease status post stents  -resume home aspirin aspirin Plavix                 Lung:   No acute issues  Continue to encourage incentive spirometry                 Gi:  No acute issues  Continue Zofran for nausea                 FEN:  Patient passed speech swallow eval  Dysphagia level 3 diet  Electrolytes within normal limits, replete as needed                 Gu:  No acute issues  Creatinine slightly increased  -continue to monitor and hold home lisinopril                 ID:   Patient afebrile without leukocytosis  Continue to monitor                  Heme:   Acute blood loss anemia secondary to right hip arthroplasty revision  -concern for hemorrhage into the surgical site status post tPA and 4 units of packed red blood cell upon arrival to [facility]  -H&H remained stable a 8 0/23 1  -continue to trend  -transfuse if hemoglobin less than 7 0                 Endo:   Type 2 diabetes  -A1c 7 2  -correctional insulin coverage with algorithm 3  -current blood glucose 160                            Msk/Skin:   Recent right hip arthroplasty revision at [facility] 68   -postop day 6  -thigh moderately tense with clean dry dressing  -neurovascular intact  -continue q 2 hours neurovascular checks  -pain control with Tylenol for mild pain, oxycodone for moderate pain, fentanyl for breakthrough pain  -patient able to weightbear as tolerated to right lower extremity per Ortho     -[facility] orthopedic records pending  -PT/OT/PMR evaluation pending    Recent or scheduled procedures:     None    Outstanding/pending diagnostics:     None    Hospital discharge planning:  PT/OT/PMR evaluation and treatment    Spoke to Dr Mancuso at 13:28 regarding transfer

## 2017-11-26 NOTE — PLAN OF CARE
Problem: Prexisting or High Potential for Compromised Skin Integrity  Goal: Skin integrity is maintained or improved  INTERVENTIONS:  - Identify patients at risk for skin breakdown  - Assess and monitor skin integrity  - Assess and monitor nutrition and hydration status  - Monitor labs (i e  albumin)  - Assess for incontinence   - Turn and reposition patient  - Assist with mobility/ambulation  - Relieve pressure over bony prominences  - Avoid friction and shearing  - Provide appropriate hygiene as needed including keeping skin clean and dry  - Evaluate need for skin moisturizer/barrier cream  - Collaborate with interdisciplinary team (i e  Nutrition, Rehabilitation, etc )   - Patient/family teaching   Outcome: Progressing      Problem: Potential for Falls  Goal: Patient will remain free of falls  INTERVENTIONS:  - Assess patient frequently for physical needs  -  Identify cognitive and physical deficits and behaviors that affect risk of falls    -  [place] fall precautions as indicated by assessment   - Educate patient/family on patient safety including physical limitations  - Instruct patient to call for assistance with activity based on assessment  - Modify environment to reduce risk of injury  - Consider OT/PT consult to assist with strengthening/mobility   Outcome: Progressing      Problem: PAIN - ADULT  Goal: Verbalizes/displays adequate comfort level or baseline comfort level  Interventions:  - Encourage patient to monitor pain and request assistance  - Assess pain using appropriate pain scale  - Administer analgesics based on type and severity of pain and evaluate response  - Implement non-pharmacological measures as appropriate and evaluate response  - Consider cultural and social influences on pain and pain management  - Notify physician/advanced practitioner if interventions unsuccessful or patient reports new pain   Outcome: Progressing      Problem: SAFETY ADULT  Goal: Maintain or return to baseline ADL function  INTERVENTIONS:  -  Assess patient's ability to carry out ADLs; assess patient's baseline for ADL function and identify physical deficits which impact ability to perform ADLs (bathing, care of mouth/teeth, toileting, grooming, dressing, etc )  - Assess/evaluate cause of self-care deficits   - Assess range of motion  - Assess patient's mobility; develop plan if impaired  - Assess patient's need for assistive devices and provide as appropriate  - Encourage maximum independence but intervene and supervise when necessary  ¯ Involve family in performance of ADLs  ¯ Assess for home care needs following discharge   ¯ Request OT consult to assist with ADL evaluation and planning for discharge  ¯ Provide patient education as appropriate   Outcome: Progressing    Goal: Maintain or return mobility status to optimal level  INTERVENTIONS:  - Assess patient's baseline mobility status (ambulation, transfers, stairs, etc )    - Identify cognitive and physical deficits and behaviors that affect mobility  - Identify mobility aids required to assist with transfers and/or ambulation (gait belt, sit-to-stand, lift, walker, cane, etc )  - [place] fall precautions as indicated by assessment  - Record patient progress and toleration of activity level on Mobility SBAR; progress patient to next Phase/Stage  - Instruct patient to call for assistance with activity based on assessment  - Request Rehabilitation consult to assist with strengthening/weightbearing, etc    Outcome: Progressing      Problem: DISCHARGE PLANNING - CARE MANAGEMENT  Goal: Discharge to post-acute care or home with appropriate resources  INTERVENTIONS:  - Conduct assessment to determine patient/family and health care team treatment goals, and need for post-acute services based on payer coverage, community resources, and patient preferences, and barriers to discharge  - Address psychosocial, clinical, and financial barriers to discharge as identified in assessment in conjunction with the patient/family and health care team  - Arrange appropriate level of post-acute services according to patient's   needs and preference and payer coverage in collaboration with the physician and health care team  - Communicate with and update the patient/family, physician, and health care team regarding progress on the discharge plan  - Arrange appropriate transportation to post-acute venues   Outcome: Progressing

## 2017-11-26 NOTE — PROGRESS NOTES
Called by RN because pt had ST elevations in II,III, and avF  Stat EKG showed ST elevations in III and aVF  Compared to EKG yesterday, pt has worsening ST elevation in III and new elevation in aVF  Also reciprocal ST depressions in aVL  Of note, pt had CONCEPCION placed 3/2017, and was off DAPT 11/15-11/20, then off DAPT 11/24-11/25 as he got tPA  Got plavix today but no ASA  Cards called  Stat trop drawn  Cards I evaluating patient  Son at bedside

## 2017-11-26 NOTE — SOCIAL WORK
Met with pt and his son Margarita Mojica, who is also pt's POA, contact # (c) [phone], to discuss pt's discharge plan  Informed them of therapy's recommendation for inpt acute rehab  They stated that pt was admitted from [facility]  Discussed [facility] and they would prefer a referral to [facility], but also would be interested in [facility]  [facility] referral sent to [facility]  SNF rehab list provided to them to review as alternate choice

## 2017-11-26 NOTE — PROGRESS NOTES
Progress Note - Neurology   Vicki Fernandes 80 y o  male MRN: [id number]  Unit/Bed#: ICU 14 Encounter: [id number]    Assessment/Plan:  29-year-old man who presented as a stroke alert on at [facility] yesterday a m  with symptoms of left upper extremity weakness left facial droop and dysarthria   -since resolved  Context was complicated by recent right hip replacement 4 days prior  Ultimately decision was made to administer IV tPA for acute ischemic stroke  IV tPA was administered at 12:32 a m  Patient was also transfused 1 unit P RBC  Patient subsequently developed increasing pain and swelling at the site of the right hip replaced  Patient was subsequently transferred to [facility] for both acute stroke care as well as availability of orthopedics  - vascular duplex scan of the bilateral upper extremities performed on at 13:40 on 11/24 2017 demonstrated an acute occlusive DVT in a single branch of the brachial vein extending from the upper to mid arm   - currently holding antiplatelets secondary to concern for bleeding at postop site  - repeat CT head as per IV tPA protocol was unremarkable for any acute intracranial pathology   - MRI brain was without any acute intracranial pathology, making the stroke alert question likely a TIA, with at least some of the left upper extremity symptom attributed to the DVT  -CTA head and neck demonstrated multiple areas of focal intracranial stenosis, most severe of which includes an near 90% narrowing the inferior division of the right MCA  Given above-mentioned left face and arm symptoms will consider this stenosis symptomatic, recommend eventual treatment with dual antiplatelet agents    -recommend continuing Plavix for now, when safe from an orthopedic standpoint regarding the right hip hematoma, recommend restarting aspirin   -currently no statin ordered given to allergic reaction hives to statins in the past   -no additional neurologic recommendations at this time  -office will call patient to arrange follow-up  -   Please call with questions    Subjective:   I feel okay    ROS:  Review of Systems   Constitutional: Negative  HENT: Negative for hearing loss  Eyes: Negative for photophobia and visual disturbance  Respiratory: Negative for wheezing  Cardiovascular: Negative for chest pain and palpitations  Genitourinary: Negative for dysuria and urgency  Neurological: Negative for dizziness, weakness, light-headedness, numbness and headaches  All other systems reviewed are negative  Vitals: Blood pressure 157/66, pulse 63, temperature 99 °F (37 2 °C), resp  rate (!) 23, height 5' 10 5" (1 791 m), weight 106 kg (233 lb 4 oz), SpO2 98 %  ,Body mass index is 32 99 kg/m²  Physical Exam:  Unchanged  Physical Exam   Constitutional:  appears well-developed and well-nourished  HENT:  Unremarkable  Head: Normocephalic and atraumatic  Eyes: EOM are normal  Pupils are equal, round, and reactive to light  Cardiovascular: Normal rate, regular rhythm and normal heart sounds     No murmur heard  Pulmonary/Chest: Breath sounds normal     Neurologic Exam   Mental Status    Oriented to person, place, and time  Level of consciousness: alert  Normal comprehension  Cranial Nerves    Visual fields full to confrontation  Pupils are equal, round, and reactive to light  Extraocular motions are normal    Nystagmus: none   Slight flattening of the nasolabial fold and widened palpebral fissure on the left  (Subtle)  Tongue, palate, uvula midline  Motor Exam   5-/5 strength in the left upper extremity  5/5 strength in the remaining 3 extremities  Muscle bulk: normal  Overall muscle tone: normal  Sensory Exam   Sensation intact to light touch and temp  Gait, Coordination, and Reflexes   DTR's symmetric  No gross ataxia  Lab, Imaging and other studies: I have personally reviewed pertinent reports      VTE Prophylaxis: Sequential compression device Marlin Aponte

## 2017-11-26 NOTE — PHYSICAL THERAPY NOTE
Physical Therapy Evaluation    Patient's Name: Kervin Chi    Admitting Diagnosis  Stroke (cerebrum) [facility]) [I63 9]    Problem List  Patient Active Problem List   Diagnosis    Stroke ([facility] )    HTN (hypertension)    DM2 (diabetes mellitus, type 2) ([place] [address] )    HLD (hyperlipidemia)    CAD (coronary artery disease)    Postoperative anemia    Right hip pain    Acute deep vein thrombosis (DVT) of brachial vein of left upper extremity (HCC)    Coagulopathy (HCC)       Past Medical History  Past Medical History:   Diagnosis Date    CAD (coronary artery disease)     DM2 (diabetes mellitus, type 2) ([place] [address] )     HLD (hyperlipidemia)     HTN (hypertension)        Past Surgical History  Past Surgical History:   Procedure Laterality Date    HIP HARDWARE REMOVAL      TOTAL HIP ARTHROPLASTY Right       11/26/17 1031   Note Type   Note type Eval only   Pain Assessment   Pain Assessment 0-10   Pain Score Worst Possible Pain   Pain Type Surgical pain;Acute pain   Pain Location Hip   Pain Orientation Right   Patient's Stated Pain Goal No pain   Hospital Pain Intervention(s) Ambulation/increased activity;Repositioned   Response to Interventions unchanged, worse w/ mvmt/mobility   Home Living   Type of Home House   Additional Comments Resides w/ wife in mobile home, few DILMA  Normally indep self care, owns cane and RW  Was admitted from rehab unit at [facility] s/p recent [facility]   Prior Function   Level of [place] Independent with ADLs and functional mobility   Falls in the last 6 months (unknown)   Restrictions/Precautions   Weight Bearing Precautions Per Order Yes   RLE Weight Bearing Per Order WBAT   Other Precautions THR;WBS;Chair Alarm; Bed Alarm;Multiple lines;Telemetry;O2;Fall Risk;Pain   General   Family/Caregiver Present No   Cognition   Overall Cognitive Status WFL   Arousal/Participation Responsive   Attention Attends with cues to redirect   Orientation Level Oriented to person;Oriented to place;Oriented to situation   Memory Unable to assess   Following Commands Follows one step commands without difficulty   RLE Assessment   RLE Assessment (limited by pain/edema, strength 2/5)   LLE Assessment   LLE Assessment (strength 4/5)   Coordination   Movements are Fluid and Coordinated 0   Coordination and Movement Description minimal L ataxia   Bed Mobility   Supine to Sit 3  Moderate assistance   Additional items Assist x 2; Increased time required;LE management   Transfers   Sit to Stand 3  Moderate assistance   Additional items Assist x 2   Stand to Sit 4  Minimal assistance   Additional items Assist x 2   Ambulation/Elevation   Gait pattern (slow, antalgic, decreased R LE WBing, mild L LE ataxia)   Gait Assistance 3  Moderate assist   Additional items Assist x 2   Assistive Device Rolling walker   Distance 5'x1 from bed to bedside recliner   Balance   Static Sitting Fair   Dynamic Sitting Poor +   Static Standing Poor +   Dynamic Standing Poor   Ambulatory Poor   Endurance Deficit   Endurance Deficit Yes   Endurance Deficit Description pain > fatigue, weakness   Activity Tolerance   Activity Tolerance Patient limited by fatigue;Patient limited by pain;Treatment limited secondary to medical complications (Comment)   Nurse Made Aware yes   Assessment   Prognosis Good   Problem List Decreased strength;Decreased range of motion;Decreased endurance; Impaired balance;Decreased mobility; Impaired judgement;Decreased cognition;Decreased safety awareness;Pain   Assessment Pt seen for high complexity physical therapy evaluation  Pt is a 79 y/o male w/ history/comorbidities of recent R hip re-implant p periprosthetic fx, CAD, PTCA/stent, HTN, HLD, DM II who is admitted from [facility] at [facility] for aciute L sided weakness, dysarthria, as well as HA, R thigh pain  Given tPA on admit  Dx is r/o CVA, as well as R hip hematoma  CT and MRI both - for CVA    Given pain, fall risk, unclear medical dx, and need for ICU monitoring, note unstable clinical picture  PT consulted to assess mobility, d/c needs  Pt presents w/ decreased functional mob, standing and sitting balance, endurance, B LE strength R > L due to R LE pain, minimally decreased L LE coordination, barriers at home  will benefit from skilled PT to correct for the above problems  Recommend return to rehab when stable   Goals   Patient Goals none stated   STG Expiration Date 12/10/17   Short Term Goal #1 1-2 wks: bed mob w/ S, sitting balance to good/normal dynamically, transfers w/ min A of 1, standing balance to fair + w/ device, ambulate  ft w/ RW and min A, increase B LE strength by 1/2 -1 grade, indep HEP   Plan   Treatment/Interventions Functional transfer training;LE strengthening/ROM; Therapeutic exercise; Endurance training;Patient/family training;Bed mobility;Gait training;Equipment eval/education   PT Frequency 5x/wk  (and 1-2x/weekend)   Recommendation   Recommendation (recommend return to rehab when stable)   Equipment Recommended Walker   PT - OK to Discharge Yes  (to rehab when stable)   Modified [place] Scale   Modified [place] Scale 4   B[place] Index   Feeding 5   Bathing 0   Grooming Score 0   Dressing Score 0   Bladder Score 10   Bowels Score 10   Toilet Use Score 5   Transfers (Bed/Chair) Score 5   Mobility (Level Surface) Score 0   Stairs Score 0   Barthel Index Score 35           Yadira Galan PT, DPT, CSRS

## 2017-11-26 NOTE — PLAN OF CARE
Problem: OCCUPATIONAL THERAPY ADULT  Goal: Performs self-care activities at highest level of function for planned discharge setting  See evaluation for individualized goals  Treatment Interventions: ADL retraining, Functional transfer training, UE strengthening/ROM, Endurance training          See flowsheet documentation for full assessment, interventions and recommendations  Limitation: Decreased ADL status, Decreased UE strength, Decreased Safe judgement during ADL, Decreased endurance, Decreased fine motor control, Decreased self-care trans  Prognosis: Good  Assessment: Patient is a 79 y/o male with recent hx of R THR revision at [facility] 11/20/17, patient admitted to [address] [place] [address] for post-acute in-patient rehab where he was found to have an acute onset of LUE weakness/ataxia and facial droop  Patient tranfered form [place] [address] rehab unit to [place] [address] and eventuall [facility]  Patient diagnosed with r/o R hemispheric CVA ? MCA, given tPA with good resolution of neurological deficits  Patient now referred to OT for functional assessment of ADL/IADL ability and discharge recommendations  Patient currently presents with ongoing R hip/Leg pain due to presumed hemorrage in R hip , decreased standing tolerance, decreased activity tolerance, mild LUE weakness and incoordiation as well as overall muscle weakness impacting occupational performance in areas of bathing, dressing, toileting, ADL transfers andfunctional mobility  From OT standpoint, recommend discharge back to in-patient rehab before discharge home with wife    Will continue to follow for acute care OT services to progress ADL skills to highest level of independence via adaptive strategies, compensatory techs and rehabilitative techs in order to decrease level of caregiver assistance and promote indepence and safety with all functional tasks   Recommendation: PT consult  OT Discharge Recommendation: Short Term Rehab  OT - OK to Discharge:  Yes

## 2017-11-26 NOTE — PROGRESS NOTES
Patient refuses Influenza vacccination, he has had reactions to them and has not had the vaccine over 25 years  >Medical Release for [facility] signed and records assessable through care everywhere     >Paper copy medical release obtained and signed for [facility]

## 2017-11-26 NOTE — CONSULTS
Consultation - Cardiology   Shari Toledo 80 y o  male MRN: [id number]  Unit/Bed#: [facility] 705-01 Encounter: [id number]      Assessment:  Principal Problem:    Stroke [facility])  Active Problems:    HTN (hypertension)    DM2 (diabetes mellitus, type 2) ([facility] )    HLD (hyperlipidemia)    CAD (coronary artery disease)    Acute deep vein thrombosis (DVT) of brachial vein of left upper extremity (HCC)    Coagulopathy ([place] [address] )      Plan:  1  Coronary artery disease with PCI to LAD in 2008 and left PDA in February 2016  - EKG from this afternoon reviewed-sinus rhythm at 70 beats per minute with occasional PVC  Subtle ST elevations in III, aVF which were seen on an EKG on 11/24/2017  Inferior infarct-seen prior  ST depressions in the lateral limb leads-seen prior  Right bundle branch block  - patient is asymptomatic at this point  Check troponin  - echo from 11/25 reviewed-LVEF 55 to 60% with mild hypokinesis of the basal to mid inferolateral wall   No significant valvular dysfunction  - continue aspirin 81 mg daily, Plavix 75 mg daily  - metoprolol tartrate 25 mg q 12 hours  Not on statin due to history of intolerance  - if troponin is elevated or patient has ongoing chest pain with EKG changes, he will be taken for a cardiac catheterization  2   Hypertension  - On amlodipine 10 mg daily, metoprolol tartrate 25 mg twice daily   - If still uncontrolled, recommend changing amlodipine to nifedipine or starting the patient on hydralazine  3   Dyslipidemia  - Most recent lipid profile 11/25/2017-total cholesterol 92, HDL 28, LDL 43    4  Type 2 diabetes  - on insulin therapy        5   TIA    History of Present Illness   Physician Requesting Consult: Ani Ibarra MD  Reason for Consult / Principal Problem:  Abnormal EKG  HPI: Shari Toledo is a 80y o  year old male with past medical history of coronary artery disease status post PCI to the LAD and left PDA in February 2016, hypertension, dyslipidemia, type 2 diabetes, recent hip right hip arthroplasty presented to [facility] on 11/24/2017 with a chief complaint of left-sided dizziness and dysarthria  A CT head was negative for any intracranial hemorrhage and the patient was given tPA  Shortly after tPA, patient developed headache with a repeat negative CT head  He also complained of worsening pain in the right thi of allergy  gh region  Hemoglobin at that point was 8 4 and he was given 1 unit of transfusion with repeat hemoglobin of 8 2 for which he received additional 3 units of blood  He was seen by Orthopedics who recommended medical management and weight-bearing as tolerated with no indication for surgical intervention  He also underwent Doppler of the upper extremities which revealed occlusive DVT in the brachial veins of the left upper limb  His symptoms were thought to be secondary to TIA  Patient was transferred to telemetry  On monitoring, he was noted to have ST elevations in lead III which prompted an EKG  He had subtle ST elevations in III, aVF and cardiology was consulted  Patient is resting in bed, denies any chest pain or pressure  No shortness of breath or palpitations  Denies any dizziness diaphoresis or presyncope  Of note, patient is on dual anti-platelet therapy with aspirin and Plavix this was held 7 days prior to his surgery for the right hip on 11/22/2017 which was done at [facility]  The medications were continued at discharge however he presented to [facility] on 11/24/2017 with stroke symptoms at which time he received tPA and his dual anti-platelet therapy was stopped  Patient was started on Plavix 75 mg today  Cardiac history-patient has a history of coronary artery disease with PCI to LAD in December 2008    Cardiac catheterization at that time showed a calcified left main with nonobstructive disease, left circumflex-large dominant with a PDA and OM showing eccentric 40-50% stenosis, LAD with single large diagonal with diffuse disease, RCA small nondominant and with nonobstructive disease  He underwent PCI placement with a drug-eluting stent in the mid LAD-60% stenosis with IVUS showing 1 5mm lumen area  He presented to Dr Sarah Vicente office on February 2016 for chest pain at which time an EKG showed inferior ST elevations and he was sent for an emergent cardiac catheterization patient underwent PCI to left PDA during that time  Since then his been stable on dual antiplatelet therapy  He is not on statin due to history of intolerance  He underwent a nuclear stress test in March 2017 which showed a moderate severe fixed inferior and inferoapical defect  Calculated LVEF was 47%  Outpatient cardiologist-Dr Sarah Vicente  EKG from this afternoon reviewed-sinus rhythm at 70 beats per minute with occasional PVC  Subtle ST elevations in III, aVF which were seen on an EKG on 11/24/2017  Inferior infarct-seen prior  ST depressions in the lateral limb leads-seen prior  Right bundle branch block  Telemetry-sinus rhythm  No arrhythmia is noted  Consults    Review of Systems:  Review of Systems   Constitutional: Negative for activity change, appetite change, chills, diaphoresis, fatigue and fever  HENT: Negative for congestion  Respiratory: Negative for cough, chest tightness, shortness of breath and wheezing  Cardiovascular: Negative for chest pain, palpitations and leg swelling  Gastrointestinal: Negative for abdominal pain, diarrhea, nausea and vomiting  Genitourinary: Negative for difficulty urinating and dysuria  Musculoskeletal: Negative for arthralgias and back pain  Skin: Negative for color change and pallor  Neurological: Negative for dizziness, syncope, facial asymmetry, light-headedness, numbness and headaches  Psychiatric/Behavioral: Negative for confusion         14 systems reviewed and negative with the exception of the above and the following    Historical Information   Past Medical History:   Diagnosis Date    CAD (coronary artery disease)     DM2 (diabetes mellitus, type 2) ([facility] )     HLD (hyperlipidemia)     HTN (hypertension)      Past Surgical History:   Procedure Laterality Date    HIP HARDWARE REMOVAL      TOTAL HIP ARTHROPLASTY Right      History   Alcohol Use No     History   Drug Use No     History   Smoking Status    Never Smoker   Smokeless Tobacco    Never Used     Family History: non-contributory    Meds/Allergies   all current active meds have been reviewed  Allergies   Allergen Reactions    Celecoxib     Hydromorphone     Pravastatin Hives       Objective   Vitals: Blood pressure (!) 179/65, pulse 79, temperature 99 °F (37 2 °C), resp  rate (!) 23, height 5' 10 5" (1 791 m), weight 106 kg (233 lb 4 oz), SpO2 95 %  , Body mass index is 32 99 kg/m² , Orthostatic Blood Pressures    Flowsheet Row Most Recent Value   Blood Pressure   179/65 filed at 11/26/2017 1526            Intake/Output Summary (Last 24 hours) at 11/26/17 1622  Last data filed at 11/26/17 1300   Gross per 24 hour   Intake             1360 ml   Output             1100 ml   Net              260 ml       Invasive Devices     Peripheral Intravenous Line            Peripheral IV 11/24/17 Left Antecubital 2 days    Peripheral IV 11/24/17 Right Antecubital 2 days    Peripheral IV 11/24/17 Right Arm 2 days                    Physical Exam:  Physical Exam   Constitutional: He is oriented to person, place, and time  He appears well-developed and well-nourished  No distress  HENT:   Head: Normocephalic and atraumatic  Eyes: Conjunctivae are normal  No scleral icterus  Neck: Neck supple  No JVD present  Cardiovascular: Normal rate, regular rhythm and intact distal pulses  Exam reveals no gallop and no friction rub  Murmur heard  2/6 ejection systolic murmur in the aortic area with radiation to the precordium  Pulmonary/Chest: Effort normal and breath sounds normal  He has no wheezes   He has no rales  He exhibits no tenderness  Abdominal: Soft  Bowel sounds are normal  There is no tenderness  Musculoskeletal: Normal range of motion  He exhibits no edema  Neurological: He is alert and oriented to person, place, and time  Skin: Skin is warm and dry  Psychiatric: He has a normal mood and affect           Lab Results:     Lab Results   Component Value Date    TROPONINI 0 04 2017    TROPONINI 0 08 (H) 2017    TROPONINI 0 05 (H) 2017       Lab Results   Component Value Date    GLUCOSE 160 (H) 2017    CALCIUM 7 8 (L) 2017     (L) 2017    K 4 2 2017    CO2 26 2017     2017    BUN 45 (H) 2017    CREATININE 1 87 (H) 2017       Lab Results   Component Value Date    WBC 8 18 2017    HGB 8 0 (L) 2017    HCT 23 1 (L) 2017    MCV 86 2017     2017       Lab Results   Component Value Date    CHOL 92 2017    CHOL 181 2017    CHOL 171 2017     Lab Results   Component Value Date    HDL 28 (L) 2017    HDL 52 2017    HDL 49 2017     Lab Results   Component Value Date    LDLCALC 43 2017    LDLCALC 112 (H) 2017    LDLCALC 107 (H) 2017     Lab Results   Component Value Date    TRIG 107 2017    TRIG 85 2017    TRIG 74 2017       Lab Results   Component Value Date    ALT 13 2017    AST 26 2017         Results from last 7 days  Lab Units 17  1519   INR  1 09       Cardiac testing:   Results for orders placed during the hospital encounter of 17   Echo complete with contrast if indicated    Narrative [address]  [address]  [phone]    Transthoracic Echocardiogram  2D, M-mode, Doppler, and Color Doppler    Study date:  2017    Patient: Khushi Villegas  MR number: [id number]  Account number: [de-identified]  : 1935  Age: 80 years  Gender: Male  Status: Inpatient  Location: Bedside  Height: 70 in  Weight: 233 lb  BP: 160/ 63 mmHg    Indications: Cerebral Vascular Accident    Diagnoses: I63 50 - Cerebral infarction due to unspecified occlusion or stenosis of unspecified cerebral artery    Sonographer:  Jazz Pardo  Primary Physician:  Sandro Couch DO  Referring Physician:  Darrell Romero MD  Interpreting Physician:  Sai Esteban MD    SUMMARY    LEFT VENTRICLE:  Systolic function was normal  Ejection fraction was estimated to be 55 %  There were no regional wall motion abnormalities  Wall thickness was mildly increased  RIGHT ATRIUM:  The atrium was mildly dilated  MITRAL VALVE:  There was mild annular calcification  There was mild regurgitation  AORTIC VALVE:  The valve was trileaflet  Leaflets exhibited moderately increased thickness, moderate calcification, lower normal cuspal separation, and reduced mobility  There was very mild stenosis  There was trace regurgitation  TRICUSPID VALVE:  There was mild regurgitation  Pulmonary artery systolic pressure was within the normal range  AORTA:  The root exhibited mild dilatation  3 6 cm  There was mild dilatation of the ascending aorta  3 8 cm    HISTORY: PRIOR HISTORY: Coronary Artery Disease s/p Stent, Hypertension, Hyperlipidemia, STEMI, Chronic Kidney Disease III, Diabetes Mellitus II    PROCEDURE: The procedure was performed at the bedside  This was a routine study  The transthoracic approach was used  The study included complete 2D imaging, M-mode, complete spectral Doppler, and color Doppler  The heart rate was 76 bpm,  at the start of the study  Images were obtained from the parasternal, apical, subcostal, and suprasternal notch acoustic windows  Echocardiographic views were limited due to lung interference  This was a technically difficult study  LEFT VENTRICLE: Size was normal  Systolic function was normal  Ejection fraction was estimated to be 55 %   There were no regional wall motion abnormalities  Wall thickness was mildly increased  No evidence of apical thrombus  DOPPLER: Left  ventricular diastolic function parameters were normal     RIGHT VENTRICLE: Systolic function was normal  Wall thickness was normal     LEFT ATRIUM: Size was at the upper limits of normal     RIGHT ATRIUM: The atrium was mildly dilated  MITRAL VALVE: There was mild annular calcification  Valve structure was normal  There was mild thickening  There was normal leaflet separation  DOPPLER: The transmitral velocity was within the normal range  There was no evidence for  stenosis  There was mild regurgitation  AORTIC VALVE: The valve was trileaflet  Leaflets exhibited moderately increased thickness, moderate calcification, lower normal cuspal separation, and reduced mobility  DOPPLER: Transaortic velocity was minimally increased  There was very  mild stenosis  There was trace regurgitation  TRICUSPID VALVE: The valve structure was normal  There was normal leaflet separation  DOPPLER: The transtricuspid velocity was within the normal range  There was no evidence for stenosis  There was mild regurgitation  Pulmonary artery  systolic pressure was within the normal range  PULMONIC VALVE: Leaflets exhibited normal thickness, no calcification, and normal cuspal separation  DOPPLER: The transpulmonic velocity was within the normal range  There was no significant regurgitation  PERICARDIUM: There was no pericardial effusion  The pericardium was normal in appearance  AORTA: The root exhibited mild dilatation  3 6 cm There was mild dilatation of the ascending aorta  3 8 cm    SYSTEMIC VEINS: IVC: The inferior vena cava was normal in size      SYSTEM MEASUREMENT TABLES    2D  %FS: 32 7 %  Ao Diam: 3 59 cm  EDV(Teich): 158 6 ml  EF(Teich): 60 43 %  ESV(Teich): 62 76 ml  IVSd: 1 17 cm  LA Area: 18 74 cm2  LA Diam: 3 73 cm  LVEDV MOD A4C: 177 65 ml  LVEF MOD A4C: 54 94 %  LVESV MOD A4C: 80 04 ml  LVIDd: 5 68 cm  LVIDs: 3 82 cm  LVLd A4C: 9 17 cm  LVLs A4C: 7 59 cm  LVOT Diam: 2 17 cm  LVPWd: 1 15 cm  RA Area: 23 41 cm2  RVIDd: 4 31 cm  SV MOD A4C: 97 61 ml  SV(Teich): 95 84 ml    CW  AR Dec Pondera: 1 4 m/s2  AR Dec Time: 2383 96 ms  AR PHT: 691 35 ms  AR Vmax: 3 13 m/s  AR maxP 2 mmHg  AV Env  Ti: 269 27 ms  AV VTI: 26 75 cm  AV Vmax: 1 93 m/s  AV Vmax: 1 48 m/s  AV Vmean: 0 99 m/s  AV maxP 91 mmHg  AV maxP 79 mmHg  AV meanP 71 mmHg  TR Vmax: 2 5 m/s  TR maxP 96 mmHg    MM  TAPSE: 2 49 cm    PW  RICHY (VTI): 2 62 cm2  RICHY Vmax: 2 57 cm2  RICHY Vmax: 1 97 cm2  E': 0 06 m/s  E/E': 11 33  LVOT Env  Ti: 282 59 ms  LVOT VTI: 19 01 cm  LVOT Vmax: 1 03 m/s  LVOT Vmean: 0 68 m/s  LVOT maxP 31 mmHg  LVOT meanP 17 mmHg  LVSV Dopp: 70 01 ml  MV A Booker: 1 02 m/s  MV Dec Pondera: 2 48 m/s2  MV DecT: 295 18 ms  MV E Booker: 0 73 m/s  MV E/A Ratio: 0 71  MV PHT: 85 6 ms  MVA By PHT: 2 57 cm2    [facility] Accredited Echocardiography Laboratory    Prepared and electronically signed by    Jasmin Fox MD  Signed [id number] 13:08:19       No results found for this or any previous visit  No procedure found  No results found for this or any previous visit  Imaging: I have personally reviewed pertinent reports  Ct Chest Abdomen Pelvis Wo Contrast    Result Date: 2017  Narrative: CT CHEST, ABDOMEN AND PELVIS WITHOUT IV CONTRAST INDICATION:  Injury to trunk  Recent surgery to right hip  Stroke alert  Weakness  COMPARISON: None  TECHNIQUE: CT examination of the chest, abdomen and pelvis was performed without intravenous contrast   Reformatted images were created in axial, sagittal, and coronal planes  Radiation dose length product (DLP) for this visit:  1323 mGy-cm     This examination, like all CT scans performed in the [facility], was performed utilizing techniques to minimize radiation dose exposure, including the use of iterative reconstruction and automated exposure control  Enteric contrast was not administered  FINDINGS: CHEST LUNGS:  Mild scattered atelectasis bilaterally  Airway is patent  No focal infiltrate  No pneumothorax  PLEURA:  Unremarkable  HEART/GREAT VESSELS:  The heart is mildly enlarged  Extensive coronary artery calcifications are noted  Thoracic aorta is normal caliber with mild/moderate wall calcification  MEDIASTINUM AND JAYDA:  Unremarkable  CHEST WALL AND LOWER NECK:   Focal skin thickening noted anterior to the sternum just right of midline measuring up to 1 6 cm  Correlate clinically for skin lesion  Rounded cyst more inferiorly anterior to the sternum measuring up to 2 2 cm possibly a sebaceous cyst     ABDOMEN Somewhat suboptimal evaluation of the upper abdomen due to streak artifact from the patient's arms  LIVER/BILIARY TREE:  Unremarkable  GALLBLADDER:  No calcified gallstones  No pericholecystic inflammatory change  SPLEEN:  Linear calcification at the inferior aspect of the spleen within a cleft, question related to old injury  PANCREAS:  Unremarkable  ADRENAL GLANDS:  Unremarkable  KIDNEYS/URETERS:  No hydronephrosis  Excreted contrast material within the collecting systems bilaterally  2 7 cm hypodense lesion in the left kidney middle pole posteriorly does not measure simple fluid attenuation which could be secondary to streak artifact  STOMACH AND BOWEL:  Limited evaluation without contrast    Grossly unremarkable  APPENDIX:  No findings to suggest appendicitis  ABDOMINOPELVIC CAVITY:  No ascites or free intraperitoneal air  No lymphadenopathy  VESSELS:  Dense wall calcification along the aorta and arteries  PELVIS Limited evaluation of the pelvis due to streak artifact from bilateral hip replacement hardware  REPRODUCTIVE ORGANS:  Prostate is not well visualized due to streak artifact from bilateral hip replacement hardware  URINARY BLADDER:  Excreted contrast within the lumen    Tiny amount of intraluminal air, could be related to recent catheterization, infection is not excluded  ABDOMINAL WALL/INGUINAL REGIONS:  Soft tissue swelling and patchy gas adjacent to the right hip compatible with recent surgery as per history  Collection in the right hip lateral subcutaneous tissues measures 4 6 x 2 8 cm, with scattered gas droplets, slightly hyperdense  OSSEOUS STRUCTURES:  Bilateral hip replacement hardware identified  The bones are demineralized  Degenerative spurring is noted along the spine  Old healed rib fractures on the left  No acute fracture  Impression: 1  Soft tissue swelling and patchy gas adjacent to the right hip compatible with recent surgery as per history  Collection in the right hip lateral  subcutaneous tissues measures 4 6 x 2 8 cm, slightly hyperdense, likely a small postoperative collection/hematoma with small droplets of air  This could be followed with ultrasound if there is clinical concern for interval enlargement  2   2 7 cm hypodense lesion in the left kidney middle pole posteriorly does not measure simple fluid attenuation  This could be a result of streak artifact  Recommend follow-up with renal ultrasound nonemergently  3   Tiny amount of air in the bladder lumen, nonspecific could be related to recent catheterization, underlying infection is not excluded  Correlate clinically  4   Focal skin thickening noted anterior to the sternum just right of midline measuring up to 1 6 cm  Correlate clinically for skin lesion  Rounded cyst more inferiorly anterior to the sternum measuring up to 2 2 cm possibly a sebaceous cyst  Workstation performed: [id number]     Xr Hip/pelv 2-3 Vws Right If Performed    Result Date: 11/25/2017  Narrative: RIGHT HIP INDICATION:  RIGHT revision  COMPARISON: Recent CT scanogram  VIEWS: AP pelvis and 2 coned down views of the hip IMAGES:  4 FINDINGS: RIGHT hip replacement hardware appears seated and intact  No evidence for loosening    Cerclage wires noted about the proximal femoral hardware  Greater trochanter fragment displaced laterally, stable compared to prior  Overlying postoperative changes seen in the upper lateral 5 consisting of soft tissue emphysema and skin staples  Prior LEFT hip replacement also noted  Contrast excretion seen within the bladder  Multilevel degenerative changes within the lumbar spine  Diffuse osteopenia  Impression: Bilateral hip replacement as above  No acute abnormality evident  Workstation performed: [id number]     Ct Head Wo Contrast    Result Date: 11/25/2017  Narrative: CT BRAIN - WITHOUT CONTRAST INDICATION:  Stroke COMPARISON:  CT 11/24/2017 TECHNIQUE:  CT examination of the brain was performed  In addition to axial images, coronal reformatted images were created and submitted for interpretation  Radiation dose length product (DLP) for this visit:  1002 77 mGy-cm   This examination, like all CT scans performed in the [facility], was performed utilizing techniques to minimize radiation dose exposure, including the use of iterative reconstruction and automated exposure control  IMAGE QUALITY:  Diagnostic  FINDINGS:  PARENCHYMA:  No intracranial hemorrhage, mass, mass effect or edema  Diffuse parenchymal changes consistent with chronic small vessel disease are reidentified  These are present with advanced degree  No definite change in the CT appearance of the parenchyma since prior study  Scattered vascular calcifications  There is no parenchymal hemorrhage  VENTRICLES AND EXTRA-AXIAL SPACES:  Normal for patient's age  VISUALIZED ORBITS AND PARANASAL SINUSES:  Unremarkable  CALVARIUM AND EXTRACRANIAL SOFT TISSUES:   Normal      Impression: No acute intracranial abnormality  Stable CT appearance of the brain  Age-appropriate volume loss, advanced chronic small vessel disease   Workstation performed: [id number]     Ct Head Wo Contrast    Result Date: 11/24/2017  Narrative: CT BRAIN - WITHOUT CONTRAST INDICATION:  Headache after TPA  COMPARISON:  Head CT 11/24/2017 1014 hours TECHNIQUE:  CT examination of the brain was performed  In addition to axial images, coronal reformatted images were created and submitted for interpretation  Radiation dose length product (DLP) for this visit:  969 mGy-cm   This examination, like all CT scans performed in the [facility], was performed utilizing techniques to minimize radiation dose exposure, including the use of iterative reconstruction and automated exposure control  IMAGE QUALITY:  Diagnostic  FINDINGS: Head CT exam performed on 11/24/2017 1320 hours  PARENCHYMA:  Patchy decreased attenuation is noted in the supratentorial white matter demonstrating an appearance most consistent with moderate microangiopathic change  No intracranial mass, mass effect or midline shift  No CT signs of acute infarction  There is no parenchymal hemorrhage  VENTRICLES AND EXTRA-AXIAL SPACES:  Age-appropriate volume loss is noted  No hydrocephalus  VISUALIZED ORBITS AND PARANASAL SINUSES:  Unremarkable  CALVARIUM AND EXTRACRANIAL SOFT TISSUES:   Normal      Impression: No acute intracranial abnormality  Stable head CT from earlier the same day  Workstation performed: [id number]     Mri Brain Wo Contrast    Result Date: 11/25/2017  Narrative: MRI BRAIN WITHOUT CONTRAST INDICATION:  CVA, recent right THR COMPARISON:   CT performed earlier same day TECHNIQUE:  Sagittal T1, axial T2, axial FLAIR, axial T1, axial [place] and axial diffusion imaging  IMAGE QUALITY:  Diagnostic  FINDINGS: BRAIN PARENCHYMA:  Diffuse generalized volume loss, enlargement of all CSF-containing spaces  No acute ischemia, intracranial mass, mass effect or edema  Moderate degree of chronic small vessel disease  Tiny focus of hemosiderin deep in the left frontal white matter also, right upper midbrain    These could represent sequela of small cavernous angiomas or microvascular hemorrhages  VENTRICLES:  The ventricles are normal in size and contour  SELLA AND PITUITARY GLAND:  Normal  ORBITS:  Normal  PARANASAL SINUSES:  Normal  VASCULATURE:  Evaluation of the major intracranial vasculature demonstrates appropriate flow voids  CALVARIUM AND SKULL BASE:  Normal  EXTRACRANIAL SOFT TISSUES:  Normal      Impression: No acute disease  Diffuse generalized volume loss, moderate degree of chronic small vessel disease  Workstation performed: [id number]     Xr Stroke Alert Portable Chest    Result Date: 11/24/2017  Narrative: CHEST INDICATION:  Stroke alert COMPARISON:  1/9/2014 VIEWS:   AP frontal IMAGES:  1 FINDINGS:     Cardiomediastinal silhouette appears unremarkable  The lungs are clear  No pneumothorax or pleural effusion  Visualized osseous structures appear within normal limits for the patient's age  Impression: No active pulmonary disease  Workstation performed: [id number]     Ct Stroke Alert Brain    Result Date: 11/24/2017  Narrative: CT BRAIN - STROKE ALERT PROTOCOL INDICATION:  Stroke alert COMPARISON:  None  TECHNIQUE:  CT examination of the brain was performed  In addition to axial images, coronal reformatted images were created and submitted for interpretation  Radiation dose length product (DLP) for this visit:  949 mGy-cm   This examination, like all CT scans performed in the [facility], was performed utilizing techniques to minimize radiation dose exposure, including the use of iterative reconstruction and automated exposure control  IMAGE QUALITY:  Diagnostic  FINDINGS:  PARENCHYMA:  No intracranial mass, mass effect or midline shift  No acute intracranial hemorrhage  No CT signs of acute infarction  Moderate periventricular and white matter hypodensities are seen likely due to chronic small vessel ischemic changes There are no CT signs of acute territorial infarct   Atherosclerotic constipation seen in the carotids VENTRICLES AND EXTRA-AXIAL SPACES:  Normal for patient's age  VISUALIZED ORBITS AND PARANASAL SINUSES:  Unremarkable  CALVARIUM AND EXTRACRANIAL SOFT TISSUES:   Normal      Impression: No acute intracranial hemorrhage seen Moderate periventricular and white matter hypodensity seen likely due to chronic small vessel ischemic changes Findings were directly discussed with Batsheva Lan on 11/24/2017 10:20 AM  Workstation performed: [id number]     Vas Upper Limb Venous Duplex Scan, Unilateral/limited    Result Date: 11/25/2017  Narrative:  [facility] REPORT CLINICAL: Indications:  Swelling [M79 [id number]]  Swelling of left forearm  FINDINGS:  Left        Impression              Subclavian  Normal (Patent)         Brachial    Occlusive Subsegmental     CONCLUSION:  Impression RIGHT UPPER LIMB LIMITED: Evaluation shows no evidence of thrombus in the internal jugular vein, subclavian vein, and the brachiocephalic vein  LEFT UPPER LIMB: Evidence of acute occlusive deep vein thrombosis in a single branch of the brachial veins  Thrombus extends from upper arm to approximately mid arm  No evidence of superficial thrombophlebitis noted  Doppler evaluation shows a normal response to augmentation maneuvers  Results given to ICU nurse and doctor at the desk at 1500  SIGNATURE: Electronically Signed by: Vicki Severs on 2017-11-25 09:28:16 AM    Us Extremity Soft Tissue    Result Date: 11/24/2017  Narrative: ULTRASOUND RIGHT HIP/ANTERIOR THIGH TECHNIQUE: Real-time grayscale and color Doppler examination of the right upper thigh was performed with a 4 MHz curvilinear transducer, beginning anteriorly proceeding laterally  INDICATION:  Abnormal CT scan  COMPARISON:  CT performed November 24, 2017 at 1323 hours  FINDINGS: Ultrasound images demonstrated only diffuse edema also obtained as fat    No obvious focal fluid collections could be confirmed on this exam         Impression: No obvious focal fluid collections could be confirmed on this exam  Recommend interval follow-up CT if there is clinical concern for enlarging hematoma Or abscess Workstation performed: [id number]     Cta Stroke Alert (head/neck)    Result Date: 11/24/2017  Narrative: CTA NECK AND BRAIN WITH AND WITHOUT CONTRAST INDICATION: Left-sided weakness COMPARISON:   None  TECHNIQUE:  Routine CT imaging of the Brain without contrast   Post contrast imaging was performed after administration of iodinated contrast through the neck and brain  Post contrast axial 0 625 mm images timed to opacify the arterial system  3D rendering was performed on an independent workstation  MIP reconstructions performed  Coronal reconstructions were performed of the noncontrast portion of the brain  Radiation dose length product (DLP) for this visit:  385 mGy-cm   This examination, like all CT scans performed in the [facility], was performed utilizing techniques to minimize radiation dose exposure, including the use of iterative reconstruction and automated exposure control  IV Contrast:  85 mL of iohexol (OMNIPAQUE)     IMAGE QUALITY:   Diagnostic FINDINGS: NONCONTRAST BRAIN PARENCHYMA:  No intracranial mass, mass effect or midline shift  No acute intracranial hemorrhage  No CT signs of acute infarction  VENTRICLES AND EXTRA-AXIAL SPACES:  Normal for patient's age  VISUALIZED ORBITS AND PARANASAL SINUSES:  Unremarkable  CALVARIUM AND EXTRACRANIAL SOFT TISSUES:   Normal  CERVICAL VASCULATURE AORTIC ARCH AND GREAT VESSELS:  There are scattered areas of foot calcific plaquing noted in the arch  The brachiocephalic artery is patent There is mild calcific plaquing seen at the region of the left subclavian artery without significant stenosis There is moderate to calcific plaquing seen at the region of the right subclavian artery  RIGHT VERTEBRAL ARTERY CERVICAL SEGMENT: The right vertebral artery demonstrates marked decrease in its caliber    A small focal area of  calcific plaquing is noted   There is moderate-sized segment of the cervical portion of the right vertebral artery which does not demonstrate to contrast enhancement extending from C3 to the C1 level  The V3 segment of the right vertebral artery isn't identified  The intracranial portion of the right vertebral artery is also decreased caliber LEFT VERTEBRAL ARTERY CERVICAL SEGMENT:  Normal origin  There is small focal area of small focal area of calcific plaquing in the left vertebral artery in the V3 segment with mild less than 50% narrowing  RIGHT EXTRACRANIAL CAROTID SEGMENT:  Normal caliber common carotid artery  Normal bifurcation and cervical internal carotid artery  There is no significant stenosis seen in the right internal carotid artery  There is medialization of the right common  carotid artery which lies in the posterior pharyngeal wall  There is moderate calcific plaquing seen with narrowing in the range of 50% in the right external carotid artery There is no significant narrowing seen within the right internal carotid artery LEFT EXTRACRANIAL CAROTID SEGMENT:  Normal caliber common carotid artery  Normal bifurcation and cervical internal carotid artery  There is mild distal left and 50% narrowing in the right common carotid artery    NASCET criteria was used to determine the degree of internal carotid artery diameter stenosis  INTRACRANIAL VASCULATURE INTERNAL CAROTID ARTERIES:  Right internal carotid artery demonstrates a episodic calcification in the cavernous portion without significant narrowing  The left internal carotid artery demonstrates moderate calcific plaquing in the cavernous portion with narrowing in the range of  50% ANTERIOR CIRCULATION: The right A1 segment is smaller  The left A1 segment is mildly larger   MIDDLE CEREBRAL ARTERY CIRCULATION:  The right M1 segment is patent   There is a mild narrowing at the region of the superior division of the right MCA There is focal area of foot high-grade narrowing with the stenosis in the range of 90% at the region of the inferior division of the right MCA  There is moderate narrowing in the range of 50% at the region of the left M1 segment Mild disease is also noted within the inferior division of the left MCA DISTAL VERTEBRAL ARTERIES:  There is small segment of foot moderate to calcific plaquing within the left vertebral artery in its intracranial portion  The before segment of the right vertebral artery is also small caliber There is atherosclerotic disease noted within the V3 segment of the right vertebral artery BASILAR ARTERY:  There is moderate to severe disease seen in the proximal to mid segment of the basilar artery POSTERIOR CEREBRAL ARTERIES: There is moderate disease noted within the both posterior cerebral arteries with multifocal areas mild narrowing DURAL VENOUS SINUSES:  Normal  NON VASCULAR ANATOMY BONY STRUCTURES:  There are degenerative changes noted within the cervical spine with reversal of the cervical lordosis with the central canal narrowing at the C3-4 level and the C4-5 level and C5-6 level SOFT TISSUES OF THE NECK:  Normal         THORACIC INLET:  Unremarkable  Impression: There is no large vessel occlusion noted within the intracranial circulation There is a small focal area of foot severe stenosis at the region of the inferior division of the right MCA with narrowing in the range of  90% There is moderate to severe atherosclerotic disease with the narrowing in the proximal to midportion of the basilar artery There is lack of enhancement noted within the small segment of the distal cervical portion of the right vertebral artery with the markedly decreased caliber of the visualized portion of the cervical vertebral artery  This may be due to total or subtotal  occlusion of the right vertebral artery   No significant carotid stenosis Central canal stenosis of the cervical spine at C3-4 and C4-5 level  I personally discussed this study with Ritchie Wlikins on 11/24/2017 10:33 AM   I personally discussed this study with Dr Maria Isabel Olivera on 11/24/2017 11:10 AM   Workstation performed: [id number]

## 2017-11-26 NOTE — CASE MANAGEMENT
Initial Clinical Review    Admission: Date/Time/Statement: 11/24/17 @ 1934     Orders Placed This Encounter   Procedures    Inpatient Admission     Standing Status:   Standing     Number of Occurrences:   1     Order Specific Question:   Admitting Physician     Answer:   Chuck Meyers     Order Specific Question:   Level of Care     Answer:   Critical Care [15]     Order Specific Question:   Bed Type     Answer:   Annel [4]     Order Specific Question:   Estimated length of stay     Answer:   More than 2 Midnights     Order Specific Question:   Certification     Answer:   I certify that inpatient services are medically necessary for this patient for a duration of greater than two midnights  See H&P and MD Progress Notes for additional information about the patient's course of treatment  Date/Time/Mode of Arrival: 11/24 Transfer from [address] [place] [address]    Chief Complaint: CVA s/p TPA    History of Illness: 80 y o  male with history of CAD s/p stents, HTN, HLD, DM-II, who is four days post-operative of explantation with reimplantation of right hip hardware for periprosthetic fracture who presented to the ED this morning with left sided weakness and dysarthria  CTH at that time was negative for acute hemorrhage and TPA was administered  Shortly after the TPA he developed worsening headache for which repeat CTH was completed and revealed no hemorrhage  He did however develop worsening pain and tension of his right thigh concerning for compartment syndrome  His hemoglobin at that point was 8 4 and he was administered 1U PRBC with repeat Hb 8 2 raising the concern for extravasation into the joint  He was transferred to [place] for orthopedic follow-up as well as continued Neurology follow-up  On arrival he is awake and alert, reports pain in his right hip/thigh and that he is thirsty asking for water  He has slight headache which is significantly improved from earlier in the day   He also continues to have dysarthria and left upper extremity weakness  Vital Signs:   Vitals   Temperature Pulse Respirations Blood Pressure SpO2   11/24/17 1720 11/24/17 1740 11/24/17 1740 11/24/17 1740 11/24/17 1900   (!) 97 3 °F (36 3 °C) 98 (!) 52 (!) 176/75 98 %      Temp Source Heart Rate Source Patient Position - Orthostatic VS BP Location FiO2 (%)   11/24/17 1720 -- -- -- --   Oral          Pain Score       11/24/17 1730       9        Wt Readings from Last 1 Encounters:   11/25/17 106 kg (233 lb 4 oz)     Vital Signs (abnormal):   Date/Time  Temp  Pulse  Resp  BP  MAP (mmHg)  SpO2  O2 Device   11/26/17 0800  99 1 °F (37 3 °C)  65   23   181/69  93  98 %  None (Room air)   11/26/17 0700  --  66  19   178/69  101  95 %  --   11/26/17 0400  99 3 °F (37 4 °C)  66   23  167/66  88  98 %  --   11/26/17 0019  --  81  --  167/61  --  --  --   11/26/17 0000   100 6 °F (38 1 °C)                 11/24/17 1945  --  88   25  166/77  111  98 %  --   11/24/17 1925  --  94  16   177/84  116  --  --   11/24/17 1910  --  94   33   172/89  120  --  --   11/24/17 1900  --  94  18  168/74  94  98 %  --   11/24/17 1840  --  88   29   183/76  106  --  --   11/24/17 1825  --  88   28   178/76           Abnormal Labs:  11/25/17 0428     Sodium 136 - 145 mmol/L 135     Potassium 3 5 - 5 3 mmol/L 4 3    Comments: Slightly Hemolyzed;  Results May be Affected   Chloride 100 - 108 mmol/L 103    CO2 21 - 32 mmol/L 25    Anion Gap 4 - 13 mmol/L 7    BUN 5 - 25 mg/dL 35     Creatinine 0 60 - 1 30 mg/dL 1 50       11/24/17 2047     Troponin I <=0 04 ng/mL 0 05       11/25/17 0011     Troponin I <=0 04 ng/mL 0 08       11/24/17 2047     Hemoglobin 12 0 - 17 0 g/dL 10 0     Hematocrit 36 5 - 49 3 % 28 1       11/25/17 1209     Hemoglobin 12 0 - 17 0 g/dL 8 7     Hematocrit 36 5 - 49 3 % 24 8       Diagnostic Test Results: CTA Stroke Alert - There is no large vessel occlusion noted within the intracranial circulation  There is a small focal area of foot severe stenosis at the region of the inferior division of the right MCA with narrowing in the range of  90%  There is moderate to severe atherosclerotic disease with the narrowing in the proximal to midportion of the basilar artery  There is lack of enhancement noted within the small segment of the distal cervical portion of the right vertebral artery with the markedly decreased caliber of the visualized portion of the cervical vertebral artery  This may be due to total or subtotal occlusion of the right vertebral artery  No significant carotid stenosis  MRI Brain - No acute disease  Diffuse generalized volume loss, moderate degree of chronic small vessel disease  Past Medical/Surgical History:    Active Ambulatory Problems     Diagnosis Date Noted    Stroke [facility]) 11/24/2017    HTN (hypertension) 11/24/2017    DM2 (diabetes mellitus, type 2) ([facility] ) 11/24/2017    HLD (hyperlipidemia) 11/24/2017    CAD (coronary artery disease) 11/24/2017    Postoperative anemia 11/24/2017    Right hip pain 11/24/2017    Acute deep vein thrombosis (DVT) of brachial vein of left upper extremity ([facility] ) 11/24/2017    Coagulopathy ([facility] ) 11/24/2017     Resolved Ambulatory Problems     Diagnosis Date Noted    No Resolved Ambulatory Problems     Past Medical History:   Diagnosis Date    CAD (coronary artery disease)     DM2 [facility]di[facility] mellitus[facility] type 2) [facility])     HLD (hyperlipidemia)     HTN (hypertension)        Admitting Diagnosis: Stroke (cerebrum) ([facility]) [I63 9]    Age/Sex: 80 y o  male      Assessment: 80 Y/OM with history of HTN, HLD, recent revision of prosthesis at [facility]  who presented with acute left sided weakness and dysarthria now s/p TPA, transferred to [facility] for post TPA care      Plan:      Neuro:   Acute lacunar CVA S/P TPA  -continue goal SBP <180  -Continue Q2H neurovascular checks  -stat CTH for any acute neuro change, currently dysarthric with left upper extremity weakness 3/5 and pronator drift  -follow-up Neurology consult  -follow-up PT/OT, PMR  -no AC/AP for 24H post TPA, repeat CTH to be done tomorrow morning around 10am  -NPO until swallow evaluation   -follow-up echocardiogram     CV:   HTN  -continue holding Norvasc, Toprol, Lisinopril, continue Labetalol PRN SBP goal <180     Acute blood loss anemia  -now s/p 4U PRBC, last Hb 10 9 at 17:40, repeat Q4H H and H x3     Lung:   Continue to encourage deep breathing and cough, ISS     GI:   NPO until passes swallow eval, continue Zofran PRN nausea     FEN:   Hyponatremia  -Na 134 on admission, not currently on fluid 2/2 4U PRBC and concern for volume overload, consider lasix if develops signs of acute overload     :    Monitor Strict I/Os     ID:   No infectious concerns at this time, continue to monitor off antibiotics     Heme:   Hb 10 9, now s/p 4U PRBC, follow Q4H H/Hx3     LUE DVT  -LUE duplex revealed brachial vein DVT, now s/p TPA for CVA, monitor clinically      Endo:   HbA1c 7 2, hold home Metformin and glyburide, will cover with ISS Q6H while NPO     Msk/Skin:   Encourage frequent turning and repositioning, implement early ambulation     Disposition: Admit to ICU for frequent neuro checks and post TPA monitoring      VTE Pharmacologic Prophylaxis: Reason for no pharmacologic prophylaxis S/P TPA  VTE Mechanical Prophylaxis: sequential compression device       Admission Orders:  VAS upper limb venous duplex  Echo  Neurological checks q2hr  Neurology cons  Ortho surgery cons  PT/OT eval and treat    Scheduled Meds:   insulin lispro 1-6 Units Subcutaneous HS   insulin lispro 2-12 Units Subcutaneous TID AC   metoprolol tartrate 25 mg Oral Q12H SACHIN   senna 1 tablet Oral HS     Continuous Infusions:    PRN Meds:   acetaminophen    fentanyl citrate (PF)    influenza vaccine    iohexol    metoprolol    ondansetron    oxyCODONE    polyethylene glycol

## 2017-11-27 ENCOUNTER — GENERIC CONVERSION - ENCOUNTER (OUTPATIENT)
Dept: OTHER | Facility: OTHER | Age: 82
End: 2017-11-27

## 2017-11-27 LAB
ANION GAP SERPL CALCULATED.3IONS-SCNC: 6 MMOL/L (ref 4–13)
ATRIAL RATE: 82 BPM
BACTERIA WND AEROBE CULT: NO GROWTH
BUN SERPL-MCNC: 38 MG/DL (ref 5–25)
CALCIUM SERPL-MCNC: 8.3 MG/DL (ref 8.3–10.1)
CHLORIDE SERPL-SCNC: 103 MMOL/L (ref 100–108)
CO2 SERPL-SCNC: 27 MMOL/L (ref 21–32)
CREAT SERPL-MCNC: 1.34 MG/DL (ref 0.6–1.3)
ERYTHROCYTE [DISTWIDTH] IN BLOOD BY AUTOMATED COUNT: 14.5 % (ref 11.6–15.1)
GFR SERPL CREATININE-BSD FRML MDRD: 50 ML/MIN/1.73SQ M
GLUCOSE SERPL-MCNC: 145 MG/DL (ref 65–140)
GLUCOSE SERPL-MCNC: 156 MG/DL (ref 65–140)
GLUCOSE SERPL-MCNC: 207 MG/DL (ref 65–140)
GLUCOSE SERPL-MCNC: 207 MG/DL (ref 65–140)
GLUCOSE SERPL-MCNC: 215 MG/DL (ref 65–140)
GRAM STN SPEC: NORMAL
HCT VFR BLD AUTO: 24 % (ref 36.5–49.3)
HGB BLD-MCNC: 8.2 G/DL (ref 12–17)
MCH RBC QN AUTO: 29.5 PG (ref 26.8–34.3)
MCHC RBC AUTO-ENTMCNC: 34.2 G/DL (ref 31.4–37.4)
MCV RBC AUTO: 86 FL (ref 82–98)
P AXIS: 2 DEGREES
PLATELET # BLD AUTO: 193 THOUSANDS/UL (ref 149–390)
PMV BLD AUTO: 9.4 FL (ref 8.9–12.7)
POTASSIUM SERPL-SCNC: 4.4 MMOL/L (ref 3.5–5.3)
PR INTERVAL: 182 MS
QRS AXIS: 74 DEGREES
QRSD INTERVAL: 144 MS
QT INTERVAL: 414 MS
QTC INTERVAL: 483 MS
RBC # BLD AUTO: 2.78 MILLION/UL (ref 3.88–5.62)
SODIUM SERPL-SCNC: 136 MMOL/L (ref 136–145)
T WAVE AXIS: -12 DEGREES
VENTRICULAR RATE: 82 BPM
WBC # BLD AUTO: 8.32 THOUSAND/UL (ref 4.31–10.16)

## 2017-11-27 PROCEDURE — 82948 REAGENT STRIP/BLOOD GLUCOSE: CPT

## 2017-11-27 PROCEDURE — 97530 THERAPEUTIC ACTIVITIES: CPT

## 2017-11-27 PROCEDURE — 80048 BASIC METABOLIC PNL TOTAL CA: CPT | Performed by: INTERNAL MEDICINE

## 2017-11-27 PROCEDURE — 92526 ORAL FUNCTION THERAPY: CPT

## 2017-11-27 PROCEDURE — 85027 COMPLETE CBC AUTOMATED: CPT | Performed by: INTERNAL MEDICINE

## 2017-11-27 RX ORDER — DOXAZOSIN MESYLATE 4 MG/1
4 TABLET ORAL DAILY
Status: DISCONTINUED | OUTPATIENT
Start: 2017-11-27 | End: 2017-12-05 | Stop reason: HOSPADM

## 2017-11-27 RX ORDER — INSULIN GLARGINE 100 [IU]/ML
10 INJECTION, SOLUTION SUBCUTANEOUS
Status: DISCONTINUED | OUTPATIENT
Start: 2017-11-27 | End: 2017-12-05 | Stop reason: HOSPADM

## 2017-11-27 RX ORDER — LISINOPRIL 20 MG/1
40 TABLET ORAL DAILY
Status: DISCONTINUED | OUTPATIENT
Start: 2017-11-27 | End: 2017-12-05 | Stop reason: HOSPADM

## 2017-11-27 RX ORDER — OXYCODONE HYDROCHLORIDE 10 MG/1
10 TABLET ORAL EVERY 4 HOURS PRN
Status: DISCONTINUED | OUTPATIENT
Start: 2017-11-27 | End: 2017-12-04

## 2017-11-27 RX ORDER — DOCUSATE SODIUM 100 MG/1
100 CAPSULE, LIQUID FILLED ORAL 2 TIMES DAILY
Status: DISCONTINUED | OUTPATIENT
Start: 2017-11-27 | End: 2017-12-03

## 2017-11-27 RX ORDER — ACETAMINOPHEN 325 MG/1
975 TABLET ORAL EVERY 8 HOURS SCHEDULED
Status: DISCONTINUED | OUTPATIENT
Start: 2017-11-27 | End: 2017-12-05 | Stop reason: HOSPADM

## 2017-11-27 RX ADMIN — OXYCODONE HYDROCHLORIDE 10 MG: 10 TABLET ORAL at 10:34

## 2017-11-27 RX ADMIN — INSULIN GLARGINE 10 UNITS: 100 INJECTION, SOLUTION SUBCUTANEOUS at 21:19

## 2017-11-27 RX ADMIN — INSULIN LISPRO 2 UNITS: 100 INJECTION, SOLUTION INTRAVENOUS; SUBCUTANEOUS at 21:19

## 2017-11-27 RX ADMIN — DOXAZOSIN 4 MG: 4 TABLET ORAL at 13:51

## 2017-11-27 RX ADMIN — INSULIN LISPRO 4 UNITS: 100 INJECTION, SOLUTION INTRAVENOUS; SUBCUTANEOUS at 11:24

## 2017-11-27 RX ADMIN — METOROPROLOL TARTRATE 5 MG: 5 INJECTION, SOLUTION INTRAVENOUS at 11:26

## 2017-11-27 RX ADMIN — ENOXAPARIN SODIUM 105 MG: 120 INJECTION SUBCUTANEOUS at 21:19

## 2017-11-27 RX ADMIN — CLOPIDOGREL BISULFATE 75 MG: 75 TABLET ORAL at 08:47

## 2017-11-27 RX ADMIN — INSULIN LISPRO 4 UNITS: 100 INJECTION, SOLUTION INTRAVENOUS; SUBCUTANEOUS at 16:39

## 2017-11-27 RX ADMIN — METOPROLOL TARTRATE 25 MG: 25 TABLET ORAL at 08:47

## 2017-11-27 RX ADMIN — AMLODIPINE BESYLATE 10 MG: 10 TABLET ORAL at 08:47

## 2017-11-27 RX ADMIN — OXYCODONE HYDROCHLORIDE 10 MG: 10 TABLET ORAL at 16:39

## 2017-11-27 RX ADMIN — METOPROLOL TARTRATE 25 MG: 25 TABLET ORAL at 21:20

## 2017-11-27 RX ADMIN — SENNOSIDES 8.6 MG: 8.6 TABLET, FILM COATED ORAL at 21:20

## 2017-11-27 RX ADMIN — ACETAMINOPHEN 975 MG: 325 TABLET, FILM COATED ORAL at 13:51

## 2017-11-27 RX ADMIN — ASPIRIN 81 MG 81 MG: 81 TABLET ORAL at 08:47

## 2017-11-27 RX ADMIN — DOCUSATE SODIUM 100 MG: 100 CAPSULE, LIQUID FILLED ORAL at 10:33

## 2017-11-27 RX ADMIN — OXYCODONE HYDROCHLORIDE 5 MG: 5 TABLET ORAL at 09:02

## 2017-11-27 RX ADMIN — INSULIN LISPRO 2 UNITS: 100 INJECTION, SOLUTION INTRAVENOUS; SUBCUTANEOUS at 08:48

## 2017-11-27 RX ADMIN — ACETAMINOPHEN 975 MG: 325 TABLET, FILM COATED ORAL at 21:20

## 2017-11-27 RX ADMIN — ENOXAPARIN SODIUM 105 MG: 120 INJECTION SUBCUTANEOUS at 10:34

## 2017-11-27 RX ADMIN — LISINOPRIL 40 MG: 20 TABLET ORAL at 10:33

## 2017-11-27 RX ADMIN — ACETAMINOPHEN 975 MG: 325 TABLET, FILM COATED ORAL at 10:34

## 2017-11-27 RX ADMIN — DOCUSATE SODIUM 100 MG: 100 CAPSULE, LIQUID FILLED ORAL at 16:39

## 2017-11-27 NOTE — OCCUPATIONAL THERAPY NOTE
[address]  Evaluation     Patient Name: Marbella Guadalupe  RMTTZ'N Date: 11/27/2017  Problem List  Patient Active Problem List   Diagnosis    Stroke ([email or web address] 75 )    HTN (hypertension)    DM2 (diabetes mellitus, type 2) ([place] [address] )    HLD (hyperlipidemia)    CAD (coronary artery disease)    Postoperative anemia    Right hip pain    Acute deep vein thrombosis (DVT) of brachial vein of left upper extremity (HCC)    Coagulopathy (HCC)     Past Medical History  Past Medical History:   Diagnosis Date    CAD (coronary artery disease)     DM2 (diabetes mellitus, type 2) ([place] [address] )     HLD (hyperlipidemia)     HTN (hypertension)      Past Surgical History  Past Surgical History:   Procedure Laterality Date    HIP HARDWARE REMOVAL      TOTAL HIP ARTHROPLASTY Right                11/26/17 1030   Note Type   Note type Eval only   Restrictions/Precautions   Weight Bearing Precautions Per Order Yes   RLE Weight Bearing Per Order WBAT   Other Precautions THR;WBS;Multiple lines;Telemetry; Fall Risk;Pain   Pain Assessment   Pain Assessment FLACC   Pain Score Worst Possible Pain   Pain Type Surgical pain   Pain Location Hip   Pain Orientation Right   Pain Descriptors Burning   Pain Frequency Constant/continuous   Hospital Pain Intervention(s) Medication (See MAR); Cold applied; Ambulation/increased activity;Repositioned   Pain Rating: FLACC (Rest) - Face 0   Pain Rating: FLACC (Rest) - Legs 1   Pain Rating: FLACC (Rest) - Activity 0   Pain Rating: FLACC (Rest) - Cry 1   Pain Rating: FLACC (Rest) - Consolability 0   Score: FLACC (Rest) 2   Pain Rating: FLACC (Activity) - Face 1   Pain Rating: FLACC (Activity) - Legs 1   Pain Rating: FLACC (Activity) - Activity 1   Pain Rating: FLACC (Activity) - Cry 1   Pain Rating: FLACC (Activity) - Consolability 1   Score: FLACC (Activity) 5   Home Living   Type of Home House   Home Layout One level   Bathroom Shower/Tub Tub/shower unit   Home Equipment Walker;Cane   Prior Function   Level of [place] Independent with ADLs and functional mobility   Lives With Spouse   ADL Assistance Independent   IADLs Independent  (+ driving, + medication mgt, + homemaking )   Vocational Retired   Lifestyle   Autonomy Reports fully I and active PTA   Reciprocal Relationships , wife is supportive and in good health    Service to Others Retired   Psychosocial   Psychosocial (WDL) WDL   Subjective   Subjective "I have so much pain in this R leg"    ADL   Eating Assistance 6  Modified independent   Grooming Assistance 4  Minimal Assistance   UB Bathing Assistance 3  Moderate Assistance   LB [address] 2  Maximal Assistance   [address] 3  Moderate Assistance    [facility] [address] 2  Maximal [address]  1  Total Assistance   Bed Mobility   Supine to Sit 3  Moderate assistance   Additional items Assist x 2; Increased time required;LE management   Transfers   Sit to Stand 4  Minimal assistance   Additional items Assist x 2   Stand to Sit 4  Minimal assistance   Additional items Assist x 2   Stand pivot 3  Moderate assistance   Additional items Assist x 2   Functional Mobility   Additional items (unable to tolerate ambulation to bathroom )   Balance   Static Sitting Fair   Dynamic Sitting Fair -  (L lean sitting to decreased weight to R hip due to pain)   Static Standing Fair -   Dynamic Standing Poor +   Activity Tolerance   Activity Tolerance Patient limited by fatigue;Patient limited by pain  (Activity Tolerance: Fair-)   Medical Staff Made Aware OT spoke with JELENA Salcedo and Brett Quezada PT    RUE Assessment   RUE Assessment WFL  (4+/5 )   LUE Assessment   LUE Assessment WFL  (mild ataxia LUE, + mild shoulder and grasp weakness )   Edema   LUE Edema +1   Hand Function   Gross Motor Coordination (mild delay LUE )   Fine Motor Coordination (mild delay LUIE )   Sensation   Light Touch No apparent deficits   Vision-Basic Assessment   Current Vision No visual deficits   Vision - Complex Assessment   Ocular Range of Motion WFL   Cognition   Overall Cognitive Status WFL   Arousal/Participation Alert   Attention Within functional limits   Orientation Level Oriented X4   Memory Within functional limits   Following Commands Follows all commands and directions without difficulty   Assessment   Limitation Decreased ADL status; Decreased UE strength;Decreased Safe judgement during ADL;Decreased endurance;Decreased fine motor control;Decreased self-care trans   Prognosis Good   Assessment Patient is a 81 y/o male with recent hx of R THR revision at [facility] 11/20/17, patient admitted to [address] [place] [address] for post-acute in-patient rehab where he was found to have an acute onset of LUE weakness/ataxia and facial droop  Patient tranfered form [place] [address] rehab unit to [place] [address] and eventuall transferd to [facility] for further evaluation  Patient diagnosed with r/o R hemispheric CVA ? MCA, given tPA with good resolution of neurological deficits  Patient now referred to OT for functional assessment of ADL/IADL ability and discharge recommendations  Patient currently presents with ongoing R hip/Leg pain due to presumed hemorrage in R hip , decreased standing tolerance, decreased activity tolerance, mild LUE weakness and incoordiation as well as overall muscle weakness impacting occupational performance in areas of bathing, dressing, toileting, ADL transfers andfunctional mobility  From OT standpoint, recommend discharge back to in-patient rehab before discharge home with wife  Will continue to follow for acute care OT services to progress ADL skills to highest level of independence via adaptive strategies, compensatory techs and rehabilitative techs in order to decrease level of caregiver assistance and promote indepence and safety with all functional tasks    Goals   Patient Goals "Go back to rehab"    LTG Time Frame 7-10   Long Term Goal #1 1    Patient will increase bed mobility to Mod I level ; 2  Patient will iincrease UB ADLs to Mod I level seated in chair or EOB; 3  Patient will increase LB ADL skills to Min A level with G carryover of THPs and use of AE; 4  Patient will increase all aspects of toileting to SBA with use of commode; 5  Patient will increase standing tolerance to 5 min with F+ balance for ADL activities in stance at sink    Long Term Goal #2 6  Patient will incease LUE function to functional I level with improvement in strength and coordination in order to use for ADL/IADL and mobility tasks    Plan   Treatment Interventions ADL retraining;Functional transfer training;UE strengthening/ROM; Endurance training   Goal Expiration Date 12/05/17   OT Frequency 5x/wk   Recommendation   Recommendation PT consult   OT Discharge Recommendation Short Term Rehab   OT - OK to Discharge Yes   Barthel Index   Feeding 5   Bathing 0   Grooming Score 0   Dressing Score 0   Bladder Score 10   Bowels Score 10   Toilet Use Score 5   Transfers (Bed/Chair) Score 5   Mobility (Level Surface) Score 0   Stairs Score 0   Barthel Index Score 35   Modified [place] Scale   Modified Yaniv Scale 4     Note written by Kacie Anderson and created by Jose Miguel Bucio 11/27/2017

## 2017-11-27 NOTE — PROGRESS NOTES
Progress Note - Neurology   Haven Speaks 80 y o  male [id number]  Unit/Bed#: [facility] 705/[facility] 705-01    Assessment:  Keysha Akers is a 80 y o  Keysha Akers is a 80 y o  right handed male with a history of HTN, DM2, HLD, CAD and recent R hip revision (s/p hip replacement 20 years ago) on 11/20/17 who presented to the ED with LUE weakness, L facial droop and slurred speech  Imaging revealed multiple area of intracranial atherosclerosis with severe stenosis of the inferior division of the R MCA  TPA was given  MRI brain did not reveal acute disease  Additionally patient diagnosed with R hip hematoma s/p TPA and LUE DVT  Patient currently on therapeutic AC and dual antiplatelets  Plan:  Likely TIA vs CVA with resolved symptoms s/p TPA  CTA reveals multiple areas of focal intracranial stenosis, most severe of which includes near 90% stenosis of the inferior division of the R MCA  Patient currently on dual antiplatelets for history of coronary CONCEPCION as well as his recently identified significant intracranial atherosclerosis Additionally he is on therapeutic Lovenox for his recently diagnosed LUE DVT  Continued conversation about risks/benefits should occur with regards to increased risk of bleeding on three agents  If patient is going to be anticoagulated x 6 months, it would be reasonable to consider single antiplatelet agent during that time from a neurologic perspective, with a plan of returning to dual antiplatelets  That being said, this depends on cardiology recommendations as well  Will check API and P2Y12 to evaluate the current effectiveness of both antiplatelet agents  Hematology has also been consulted  No additional inpatient recommendations  Outpatient Neurologic follow-up  Additional recommendations as per Attending Neurologist     Subtle confusion on exam  This is likely multifactorial, including secondary to recent events, narcotics, recent surgery and baseline cognitive function    Monitor closely  HTN  Amlodipine  Lisinopril  Lopressor    DM2  Lantus  Humalog    HLD  No statin as patient states he's had hives to statins in the past    CAD  S/p coronary CONCEPCION  Aspirin 81mg    S/p R Hip revision  Oxycodone IR  Acetaminophen 975mg  Posterior hip precautions  PT/OT    LUE DVT  Therapeutic Lovenox    Subjective:   Sidra Kidd is a 80 y o  right handed male with a history of HTN, DM2, HLD, CAD and recent R hip revision (s/p hip replacement 20 years ago) on 11/20/17 who presented to the ED at [facility] on the evening of 11/24/17 with stroke like symptoms  The patient has been residing at [facility] since he was discharged following his recent R hip replacement  He was sent to the ED due to LUE weakness, L shoulder pain and slurred speech noted by therapy staff  L sided facial droop was also supposedly noted on the way to the ED  According to notes, the night prior, the patient's son had noticed that the patient was a little confused and had slurred speech which resolved within a few minutes  Patient was given TPA  Prior to administration patient and family were educated on the risk of bleeding especially in the setting of recent surgery  Patient's symptoms initially appeared to improve, though returned post TPA and a visual field cut developed  Imaging post TPA did reveal a R hip hematoma and patient developed worsening pain, right thigh induration and oozing from surgical site  The patient was also noted to have a LUE DVT  The patient was transferred to [facility] for [facility] management  The patient's symptoms have since resolved  Today on my exam, the patient is very tired dozing off intermittently during exam  He appears confused at times  He does state that his symptoms that he developed PTA have since resolved  Patient denies chest pain, shortness of breath, vision changes, new weakness or numbness    No family members were present at bedside during my exam     Past Medical History:   Diagnosis Date    CAD (coronary artery disease)     DM2 (diabetes mellitus, type 2) (HCC)     HLD (hyperlipidemia)     HTN (hypertension)      Past Surgical History:   Procedure Laterality Date    HIP HARDWARE REMOVAL      TOTAL HIP ARTHROPLASTY Right      Family history:  Family History   Problem Relation Age of Onset    Family history unknown: Yes       Social History     Social History    Marital status:      Spouse name: N/A    Number of children: N/A    Years of education: N/A     Social History Main Topics    Smoking status: Never Smoker    Smokeless tobacco: Never Used    Alcohol use No    Drug use: No    Sexual activity: Not Currently     Partners: Female     Birth control/ protection: None     Other Topics Concern    None     Social History Narrative    None       Scheduled Meds:  acetaminophen 975 mg Oral Q8H [address]   amLODIPine 10 mg Oral Daily   aspirin 81 mg Oral Daily   clopidogrel 75 mg Oral Daily   docusate sodium 100 mg Oral BID   enoxaparin 1 mg/kg Subcutaneous Q12H [address]   insulin glargine 10 Units Subcutaneous HS   insulin lispro 1-6 Units Subcutaneous HS   insulin lispro 2-12 Units Subcutaneous TID AC   lisinopril 40 mg Oral Daily   metoprolol tartrate 25 mg Oral Q12H SACHIN   senna 1 tablet Oral HS     Continuous Infusions:   PRN Meds:  influenza vaccine    iohexol    metoprolol    ondansetron    oxyCODONE    oxyCODONE    polyethylene glycol    ROS: A 12 point ROS was completed and other than the complaints mentioned above in the HPI, all remaining systems were negative  Vitals: BP (!) 179/73   Pulse 72   Temp 99 °F (37 2 °C) (Oral)   Resp 18   Ht 5' 10 5" (1 791 m)   Wt 106 kg (233 lb 4 oz)   SpO2 95%   BMI 32 99 kg/m²     Physical Exam:   Physical Exam   Constitutional: He is oriented to person, place, and time  He appears well-developed and well-nourished  No distress  Seated in a chair   Dozing intermittent throughout exam    HENT:   Head: Normocephalic and atraumatic  Eyes: Conjunctivae and EOM are normal  Pupils are equal, round, and reactive to light  Right eye exhibits no discharge  Left eye exhibits no discharge  No scleral icterus  Neck: Normal range of motion  Neck supple  Cardiovascular: Normal rate, regular rhythm and normal heart sounds  Exam reveals no gallop and no friction rub  No murmur heard  Pulmonary/Chest: Effort normal and breath sounds normal  No stridor  No respiratory distress  He has no wheezes  He has no rales  He exhibits no tenderness  Abdominal: Soft  Bowel sounds are normal  He exhibits no distension  There is no tenderness  Musculoskeletal:   Recent R hip revision  RLE propped up on a pillow  Neurological: He is alert and oriented to person, place, and time  Finger-nose-finger test: No dysmetria, though slower on the left  Skin: Skin is warm and dry  No rash noted  No erythema  Psychiatric: His speech is normal    Flat affect  Altered insight  Neurologic Exam     Mental Status   Oriented to person, place, and time  Speech: speech is normal   Level of consciousness: drowsy  Decreased Attention, Concentration and Comprehension  When asked to say the days of the week backwards, he recited them forwards repetitively  When asked how many quarters in $1 25, it took him 3 attempts to get it correct, though he was able to tell me that a quarter, a nickel and a dime = 40 cents  Cranial Nerves     CN II   Visual acuity: normal (grossly)  Right visual field deficit: none  Left visual field deficit: none     CN III, IV, VI   Pupils are equal, round, and reactive to light  Extraocular motions are normal    Right pupil: Size: 2 mm  Shape: regular  Reactivity: brisk  Consensual response: intact  Accommodation: intact  Left pupil: Size: 2 mm  Shape: regular  Reactivity: brisk  Consensual response: intact  Accommodation: intact     CN III: no CN III palsy  CN VI: no CN VI palsy  Nystagmus: none   Diplopia: none  Conjugate gaze: present    CN V   Facial sensation intact  CN VII   Facial expression full, symmetric  CN XI   CN XI normal      CN XII   CN XII normal      Motor Exam   Muscle bulk: normal  Overall muscle tone: normal  Right arm pronator drift: absent  Left arm pronator drift: absent  BUEs 5/5 throughout  LLE 5/5 with repetitive cuing  RLE not tested due to recent hip surgery     Sensory Exam   Right arm light touch: normal  Left arm light touch: normal  Right leg light touch: normal (in the lower leg)  Left leg light touch: normal    Gait, Coordination, and Reflexes     Coordination   Finger-nose-finger test: No dysmetria, though slower on the left        Labs:  Recent Results (from the past 24 hour(s))   Fingerstick Glucose (POCT)    Collection Time: 11/26/17 11:21 AM   Result Value Ref Range    POC Glucose 229 (H) 65 - 140 mg/dl   ECG 12 lead    Collection Time: 11/26/17  3:17 PM   Result Value Ref Range    Ventricular Rate 74 BPM    Atrial Rate 74 BPM    UT Interval 200 ms    QRSD Interval 154 ms    QT Interval 450 ms    QTC Interval 499 ms    P Axis 31 degrees    QRS Axis 42 degrees    T Wave Axis -4 degrees   Troponin I    Collection Time: 11/26/17  3:36 PM   Result Value Ref Range    Troponin I 0 04 <=0 04 ng/mL   Fingerstick Glucose (POCT)    Collection Time: 11/26/17  4:28 PM   Result Value Ref Range    POC Glucose 363 (H) 65 - 140 mg/dl   Troponin I    Collection Time: 11/26/17  6:26 PM   Result Value Ref Range    Troponin I 0 03 <=0 04 ng/mL   Fingerstick Glucose (POCT)    Collection Time: 11/26/17  9:17 PM   Result Value Ref Range    POC Glucose 230 (H) 65 - 140 mg/dl   Troponin I    Collection Time: 11/26/17  9:20 PM   Result Value Ref Range    Troponin I 0 04 <=0 04 ng/mL   CBC and Platelet    Collection Time: 11/27/17  6:45 AM   Result Value Ref Range    WBC 8 32 4 31 - 10 16 Thousand/uL    RBC 2 78 (L) 3 88 - 5 62 Million/uL    Hemoglobin 8 2 (L) 12 0 - 17 0 g/dL    Hematocrit 24 0 (L) 36 5 - 49 3 %    MCV 86 82 - 98 fL    MCH 29 5 26 8 - 34 3 pg    MCHC 34 2 31 4 - 37 4 g/dL    RDW 14 5 11 6 - 15 1 %    Platelets 453 771 - 233 Thousands/uL    MPV 9 4 8 9 - 12 7 fL   Basic metabolic panel    Collection Time: 17  6:45 AM   Result Value Ref Range    Sodium 136 136 - 145 mmol/L    Potassium 4 4 3 5 - 5 3 mmol/L    Chloride 103 100 - 108 mmol/L    CO2 27 21 - 32 mmol/L    Anion Gap 6 4 - 13 mmol/L    BUN 38 (H) 5 - 25 mg/dL    Creatinine 1 34 (H) 0 60 - 1 30 mg/dL    Glucose 145 (H) 65 - 140 mg/dL    Calcium 8 3 8 3 - 10 1 mg/dL    eGFR 50 ml/min/1 73sq m   Fingerstick Glucose (POCT)    Collection Time: 17  6:47 AM   Result Value Ref Range    POC Glucose 156 (H) 65 - 140 mg/dl         Imagin17 CT stroke alert brain  No acute intracranial hemorrhage seen  Moderate periventricular and white matter hypodensity seen likely due to chronic small vessel ischemic changes    17 CTA stroke alert  There is no large vessel occlusion noted within the intracranial circulation  There is a small focal area of foot severe stenosis at the region of the inferior division of the right MCA with narrowing in the range of  90%  There is moderate to severe atherosclerotic disease with the narrowing in the proximal to midportion of the basilar artery  There is lack of enhancement noted within the small segment of the distal cervical portion of the right vertebral artery with the markedly decreased caliber of the visualized portion of the cervical vertebral artery  This may be due to total or subtotal occlusion of the right vertebral artery  No significant carotid stenosis  Central canal stenosis of the cervical spine at C3-4 and C4-5 level     17 CT head wo contrast  No acute intracranial abnormality  Stable head CT from earlier the same day  17 CT C/A/P  Soft tissue swelling and patchy gas adjacent to the right hip compatible with recent surgery as per history    Collection in the right hip lateral  subcutaneous tissues measures 4 6 x 2 8 cm, slightly hyperdense, likely a small postoperative collection/hematoma with small droplets of air  This could be followed with ultrasound if there is clinical concern for interval enlargement  2   2 7 cm hypodense lesion in the left kidney middle pole posteriorly does not measure simple fluid attenuation  This could be a result of streak artifact  Recommend follow-up with renal ultrasound nonemergently  3   Tiny amount of air in the bladder lumen, nonspecific could be related to recent catheterization, underlying infection is not excluded  Correlate clinically  4   Focal skin thickening noted anterior to the sternum just right of midline measuring up to 1 6 cm  Correlate clinically for skin lesion  Rounded cyst more inferiorly anterior to the sternum measuring up to 2 2 cm possibly a sebaceous cyst      11/24/17 VAS upper limb duplex  RUE no thromvus  LUE - Acute occlusive deep vein thrombosis in a single branch of the brachial veins  Thrombus extends from upper arm to approximately mid arm  No evidence of superficial thrombophlebitis noted  11/25/17 CT head wo contrast  No acute intracranial abnormality  Stable CT appearance of the brain  Age-appropriate volume loss, advanced chronic small vessel disease  11/25/17 MRI brain wo contrast  No acute disease  Diffuse generalized volume loss, moderate degree of chronic small vessel disease  I have personally reviewed the above imaging and PACS reports  VTE Prophylaxis: Sequential compression device (Venodyne)  and Enoxaparin (Lovenox)    Counseling / Coordination of Care  Total time spent today 35 minutes  Greater than 50% of total time was spent with the patient and / or family counseling and / or coordination of care   A description of the counseling / coordination of care: speaking with RN, primary team and patient/family about medical status and plan of care

## 2017-11-27 NOTE — SPEECH THERAPY NOTE
Speech Language/Pathology                             SLP  Note  Patient Name: Gagandeep Sprague  Today's Date: 11/27/2017     Problem List  Patient Active Problem List   Diagnosis    [facility]    HTN (hypertension)    [facility], [facility]    HLD (hyperlipidemia)    CAD (coronary artery disease)    Postoperative anemia    Right hip pain    Acute deep vein thrombosis (DVT) of brachial vein of left upper extremity (HCC)    Coagulopathy (HCC)     Past Medical History  Past Medical History:   Diagnosis Date    CAD (coronary artery disease)     [facility]     HLD (hyperlipidemia)     HTN (hypertension)      Past Surgical History  Past Surgical History:   Procedure Laterality Date    HIP HARDWARE REMOVAL      TOTAL HIP ARTHROPLASTY Right          Subjective:  Pt was seen for dysphagia treatment this afternoon  Family present  Nurse gave permission for PO trials  Pt was alert and cooperative  Objective:  Pt was assessed with toast and thin water by straw  Pt demonstrated mildly prolonged yet functional mastication with toast  [facility]  Min oral retention note which pt did clear independently with liquid wash  Swallows were prompt and strong  No s/s of pharyngeal dysphagia noted  No s/s of aspiration noted even with successive sips of thin liquids  SLP educated pt on diet options in this facility  Pt stated that he wishes to continue with a level 3 diet as he feels it is easier to eat  Assessment:  Pt appears to be tolerating a level 3 diet and thin liquids; he is requesting to stay on this diet      Plan/Recommendations:  Continue with level 3 diet and thin liquids  Speech to f/u x1 to check to make sure pt wishes to stay on level 3 diet

## 2017-11-27 NOTE — PLAN OF CARE
Problem: PHYSICAL THERAPY ADULT  Goal: Performs mobility at highest level of function for planned discharge setting  See evaluation for individualized goals  Treatment/Interventions: Functional transfer training, LE strengthening/ROM, Therapeutic exercise, Endurance training, Patient/family training, Bed mobility, Gait training, Equipment eval/education  Equipment Recommended: Matthew Mhamood       See flowsheet documentation for full assessment, interventions and recommendations  Outcome: Progressing  Prognosis: Good  Problem List: Decreased strength, Decreased endurance, Impaired balance, Decreased range of motion, Decreased mobility, Decreased cognition, Impaired judgement, Decreased safety awareness, Pain  Assessment: Pt seen for session, PT 1;1 activity x 25 min for setup, bed mob, time spent EOB, transfers, minimal gait, and reposiitoning  Pt still having a great deal of R hip pain, worse w/ any mvmt  Pt w/ difficulty WBing R LE and deed[place] w/ same  Pts PASS= 10/36, scorr less thna 32/36 = decreased balance and trunk control  Remains appropriate for rehab at d/c        Recommendation:  (recommend return to rehab when stable)     PT - OK to Discharge: (S) Yes (to rehab when stable)    See flowsheet documentation for full assessment

## 2017-11-27 NOTE — PROGRESS NOTES
[facility] Internal Medicine Progress Note  Patient: Adamaris Mackenzie 80 y o  male   MRN: [id number]  PCP: Nanette Waterman DO  Unit/Bed#: [facility]PHP 705-01 Encounter: [id number]  Date Of Visit: 11/27/17    Assessment:    Principal Problem:    Stroke [facility])  Active Problems:    HTN (hypertension)    DM2 (diabetes mellitus, type 2) ([facility] [address] )    HLD (hyperlipidemia)    CAD (coronary artery disease)    Acute deep vein thrombosis (DVT) of brachial vein of left upper extremity ([facility] [address] )    Coagulopathy ([facility] )      Plan:    TIA s/p TPA on 11/24: appreciate neuro recs  PT for now on DAPT  Per neuro, will likely but on ASA or Plavix as pt needs AC for DVT  Not on statin 2/2 allergy  ABLA 2/2 right hip hematoma: Pt received 4U PRBC  Ortho consulted  Will monitor H/H closely with pt currently on DAPT and therapeutic Lovenox  Acute LUE DVT: I started pt on therapeutic Lovenox  Consulted hematolgy for recs on [facility] as  Pt had recent bleed but has proximal DVT  CAD s/p PCI x 2, last stent 2/2016: EKG shows ST elevations, but these appear to be present in prior EKGs  Trops neg x 3  On DAPT for now, but per cards, can go off ASA OR Plavix  S/p R hip revision MAREN 11/20: per ortho, WBAT  This appears stable  Pain control with oxycodone prn and tylenol rtc  CKD3: Cr under baseline 1 4-1 5    HTN: Restarted pt's home meds including ACEi and Cardura  C/w BB and Norvasc  VTE Pharmacologic Prophylaxis:   Pharmacologic: Enoxaparin (Lovenox)  Mechanical VTE Prophylaxis in Place: Yes    Patient Centered Rounds: I have performed bedside rounds with nursing staff today  Discussions with Specialists or Other Care Team Provider: neuro and cardio    Education and Discussions with Family / Patient: pt - declined call to family    Time Spent for Care: 30 minutes  More than 50% of total time spent on counseling and coordination of care as described above      Current Length of Stay: 3 day(s)    Current Patient Status: Inpatient Certification Statement: The patient will continue to require additional inpatient hospital stay due to need to monitor for bleeding    Discharge Plan / Estimated Discharge Date: pending course    Code Status: Level 1 - Full Code      Subjective:   Pt seen and examined  C/o pain in left hip  Otherwise denies CP and SOB  Objective:     Vitals:   Temp (24hrs), Av 9 °F (37 2 °C), Min:97 4 °F (36 3 °C), Max:99 7 °F (37 6 °C)    HR:  [63-79] 72  Resp:  [18-23] 18  BP: (149-203)/(50-73) 179/73  SpO2:  [94 %-98 %] 95 %  Body mass index is 32 99 kg/m²  Input and Output Summary (last 24 hours): Intake/Output Summary (Last 24 hours) at 17 0912  Last data filed at 17 0840   Gross per 24 hour   Intake              980 ml   Output             1025 ml   Net              -45 ml       Physical Exam:     Physical Exam   Constitutional: He is oriented to person, place, and time  He appears well-developed and well-nourished  HENT:   Head: Normocephalic and atraumatic  Eyes: Conjunctivae and EOM are normal    Neck: Normal range of motion  Neck supple  Cardiovascular: Normal rate and regular rhythm  Pulmonary/Chest: Effort normal and breath sounds normal  He has no wheezes  He has no rales  Abdominal: Soft  Bowel sounds are normal  He exhibits no distension  There is no tenderness  Musculoskeletal:   Right hip pain   Neurological: He is alert and oriented to person, place, and time  Skin: Skin is warm and dry           Additional Data:     Labs:      Results from last 7 days  Lab Units 17  0645 17  0411   WBC Thousand/uL 8 32 8 18   HEMOGLOBIN g/dL 8 2* 8 0*   HEMATOCRIT % 24 0* 23 1*   PLATELETS Thousands/uL 193 156   NEUTROS PCT %  --  69   LYMPHS PCT %  --  16   MONOS PCT %  --  12   EOS PCT %  --  3       Results from last 7 days  Lab Units 17  0645  17  0428   SODIUM mmol/L 136  < > 135*   POTASSIUM mmol/L 4 4  < > 4 3   CHLORIDE mmol/L 103  < > 103   CO2 mmol/L 27  < > 25   BUN mg/dL 38*  < > 35*   CREATININE mg/dL 1 34*  < > 1 50*   CALCIUM mg/dL 8 3  < > 7 8*   TOTAL PROTEIN g/dL  --   --  5 4*   BILIRUBIN TOTAL mg/dL  --   --  1 27*   ALK PHOS U/L  --   --  33*   ALT U/L  --   --  13   AST U/L  --   --  26   GLUCOSE RANDOM mg/dL 145*  < > 172*   < > = values in this interval not displayed  Results from last 7 days  Lab Units 11/24/17  1519   INR  1 09       * I Have Reviewed All Lab Data Listed Above  * Additional Pertinent Lab Tests Reviewed: All [facility] Admission Reviewed        Recent Cultures (last 7 days):       Results from last 7 days  Lab Units 11/24/17  1429   GRAM STAIN RESULT  No Polys or Bacteria seen   WOUND CULTURE  No growth       Last 24 Hours Medication List:     acetaminophen 975 mg Oral Q8H [facility]   amLODIPine 10 mg Oral Daily   aspirin 81 mg Oral Daily   clopidogrel 75 mg Oral Daily   enoxaparin 1 mg/kg Subcutaneous Q12H [facility]   insulin glargine 10 Units Subcutaneous HS   insulin lispro 1-6 Units Subcutaneous HS   insulin lispro 2-12 Units Subcutaneous TID AC   lisinopril 40 mg Oral Daily   metoprolol tartrate 25 mg Oral Q12H [facility]   senna 1 tablet Oral HS        Today, Patient Was Seen By: Ciera Babb DO    ** Please Note: This note has been constructed using a voice recognition system   **

## 2017-11-27 NOTE — PHYSICAL THERAPY NOTE
Physical Therapy Treatment Note     11/27/17 1030   Pain Assessment   Pain Assessment 0-10   Pain Score 7   Pain Type Acute pain   Pain Location Hip   Pain Orientation Right   Patient's Stated Pain Goal No pain   Hospital Pain Intervention(s) Ambulation/increased activity   Response to Interventions unchanged   Precautions   Total Hip Replacement (yes)   Restrictions/Precautions   Weight Bearing Precautions Per Order Yes   RLE Weight Bearing Per Order WBAT   Other Precautions Fall Risk;Telemetry;Multiple lines;WBS;THR;Pain   General   Chart Reviewed Yes   Family/Caregiver Present No   Cognition   Overall Cognitive Status Impaired   Arousal/Participation Responsive   Attention Attends with cues to redirect   Orientation Level Oriented X4   Memory Unable to assess   Following Commands Follows one step commands without difficulty   Subjective   Subjective states he feels OK, but still moderate to severe pain w/ hip mvmt   Bed Mobility   Supine to Sit 3  Moderate assistance   Additional items Assist x 2   Additional Comments sat EOB x 5-10 min prior to transfers, gait   Transfers   Sit to Stand 3  Moderate assistance   Additional items Assist x 2   Stand to Sit 3  Moderate assistance   Additional items Assist x 2; Increased time required; Impulsive;Verbal cues  (cues for hand placement, control of R LE)   Ambulation/Elevation   Gait pattern (antalgic, short step length, R knee buckling)   Gait Assistance 3  Moderate assist   Additional items Assist x 2  (w/ 3rd for chair follow)   Assistive Device Rolling walker   Distance 5'x1, then time spent p session to reposition in chair   Balance   Static Sitting Fair  (leaning to L in sitting, likely to take pressure off R hip)   Dynamic Sitting Poor +   Static Standing Poor   Dynamic Standing Poor   Ambulatory Poor   Endurance Deficit   Endurance Deficit Yes   Endurance Deficit Description pain, fatigue, weakness   Activity Tolerance   Activity Tolerance Patient limited by fatigue;Patient limited by pain;Treatment limited secondary to medical complications (Comment)   Nurse Made Aware yes   Assessment   Prognosis Good   Problem List Decreased strength;Decreased endurance; Impaired balance;Decreased range of motion;Decreased mobility; Decreased cognition; Impaired judgement;Decreased safety awareness;Pain   Assessment Pt seen for session, PT 1;1 activity x 25 min for setup, bed mob, time spent EOB, transfers, minimal gait, and reposiitoning  Pt still having a great deal of R hip pain, worse w/ any mvmt  Pt w/ difficulty WBing R LE and [place] w/ same  Pts PASS= 10/36, scorr less thna 32/36 = decreased balance and trunk control  Remains appropriate for rehab at d/c   Goals   Patient Goals none stated   STG Expiration Date 12/10/17   Treatment Day 1   Plan   Treatment/Interventions Functional transfer training;LE strengthening/ROM; Therapeutic exercise; Endurance training;Patient/family training;Equipment eval/education; Bed mobility;Gait training   Progress Progressing toward goals   PT Frequency 5x/wk  (and 1-2x/weekend)   Recommendation   Recommendation (recommend return to rehab when stable)   Equipment Recommended Walker   PT - OK to Discharge Yes  (to rehab when stable)   Cheryal Party PT, DPT CSRS

## 2017-11-27 NOTE — CONSULTS
Oncology Consult Note  Ratna Wade 80 y o  male MRN: [id number]  Unit/Bed#: [facility] 705-01 Encounter: [id number]      Presenting Complaint:  Consultation for left upper arm DVT and right hip hematoma  History of Presenting Illness:  Patient is 80 year of age  1 week ago he had right hip surgery, reimplantation of right hip hardware  4 days later on 11/24/17 patient presented with CVA with left hemiparesis  There was no brain hemorrhage  He had tPA  With that hemoglobin dropped and he developed right hip hematoma  Also left upper arm DVT  Patient was on Plavix and aspirin post right hip surgery and he had been on it because of previous history of coronary stents  In spite of that he had CVA and left upper arm DVT  Presently hemoglobin has stabilized  There is improvement in strength of left arm and leg  There is not much swelling, pain and tenderness left upper arm  He is improving  Presently is on Lovenox and Plavix  Comorbid conditions coronary artery disease, stents, hypertension, dyslipidemia and type 2 diabetes mellitus    Review of Systems -  Reviewed 13 systems  Presently no headaches, seizures, diplopia, dysphagia, hoarseness, chest pain, palpitations, shortness of breath, cough, hemoptysis, abdominal pain, melena, hematuria  No fevers, chills,  skin rash, itching, weight loss, night sweats,  No swollen glands  There is some swelling of right lower leg  Patient is anxious  Other symptoms as in HPI    Past Medical History:   Diagnosis Date    CAD (coronary artery disease)     DM2 (diabetes mellitus, type 2) ([facility] )     HLD (hyperlipidemia)     HTN (hypertension)        Social History     Social History    Marital status:       Spouse name: N/A    Number of children: N/A    Years of education: N/A     Social History Main Topics    Smoking status: Never Smoker    Smokeless tobacco: Never Used    Alcohol use No    Drug use: No    Sexual activity: Not Currently     Partners: Female     Birth control/ protection: None     Other Topics Concern    None     Social History Narrative    None       Family History   Problem Relation Age of Onset    Family history unknown:  Yes       Allergies   Allergen Reactions    Celecoxib     Hydromorphone     Pravastatin Hives         Current Facility-Administered Medications:     acetaminophen (TYLENOL) tablet 975 mg, 975 mg, Oral, Q8H [address], Schuyler Carey, DO, 975 mg at 11/27/17 1351    amLODIPine (NORVASC) tablet 10 mg, 10 mg, Oral, Daily, Irving Lovell DO, 10 mg at 11/27/17 0847    aspirin chewable tablet 81 mg, 81 mg, Oral, Daily, Hiren Wong DO, 81 mg at 11/27/17 0847    clopidogrel (PLAVIX) tablet 75 mg, 75 mg, Oral, Daily, Irving Lovell DO, 75 mg at 11/27/17 0847    docusate sodium (COLACE) capsule 100 mg, 100 mg, Oral, BID, Hiren Wong DO, 100 mg at 11/27/17 1639    doxazosin (CARDURA) tablet 4 mg, 4 mg, Oral, Daily, Renetta Na, DO, 4 mg at 11/27/17 1351    enoxaparin (LOVENOX) subcutaneous injection 105 mg, 1 mg/kg, Subcutaneous, Q12H [address], Hiren Wong DO, 105 mg at 11/27/17 1034    influenza inactivated quadrivalent vaccine (FLULAVAL) IM injection 0 5 mL, 0 5 mL, Intramuscular, Prior to discharge, Alfonso Schaefer MD    insulin glargine (LANTUS) subcutaneous injection 10 Units, 10 Units, Subcutaneous, HS, Hiren Wong DO    insulin lispro (HumaLOG) 100 units/mL subcutaneous injection 1-6 Units, 1-6 Units, Subcutaneous, HS, Casey Bruce DO, 3 Units at 11/26/17 2118    insulin lispro (HumaLOG) 100 units/mL subcutaneous injection 2-12 Units, 2-12 Units, Subcutaneous, TID AC, 4 Units at 11/27/17 1639 **AND** Fingerstick Glucose (POCT), , , 4x Daily AC and at bedtime, Casey Bruce DO    iohexol (OMNIPAQUE) 350 MG/ML injection (MULTI-DOSE) 85 mL, 85 mL, Intravenous, Once in imaging, Time JALEEL Roper    lisinopril (ZESTRIL) tablet 40 mg, 40 mg, Oral, Daily, Hiren Wong DO, 40 mg at 11/27/17 1034   metoprolol (LOPRESSOR) injection 5 mg, 5 mg, Intravenous, Q6H PRN, JALEEL Fortune, 5 mg at 11/27/17 1126    metoprolol tartrate (LOPRESSOR) tablet 25 mg, 25 mg, Oral, Q12H [address], Iva Krasa, DO, 25 mg at 11/27/17 0847    ondansetron (ZOFRAN) injection 4 mg, 4 mg, Intravenous, Q4H PRN, Hector Fleming DO    oxyCODONE (ROXICODONE) immediate release tablet 10 mg, 10 mg, Oral, Q4H PRN, Mortimer Bayley, DO, 10 mg at 11/27/17 1639    oxyCODONE (ROXICODONE) IR tablet 5 mg, 5 mg, Oral, Q4H PRN, Hector Fleming, DO, 5 mg at 11/27/17 0902    polyethylene glycol (MIRALAX) packet 17 g, 17 g, Oral, Daily PRN, Reita Estimable, DO    senna (SENOKOT) tablet 8 6 mg, 1 tablet, Oral, HS, Irving Lovell, DO, 8 6 mg at 11/26/17 2114      /59   Pulse 60   Temp 98 1 °F (36 7 °C) (Oral)   Resp 18   Ht 5' 10 5" (1 791 m)   Wt 106 kg (233 lb 4 oz)   SpO2 96%   BMI 32 99 kg/m²     General Appearance:     Alert, oriented, not in distress       Eyes:     No icterus   Ears:    Normal external ears (bilateral)   Nose:   Nares normal   Throat:   No thrush      Neck:   Supple, no palpable neck masses       Lungs:     Clear to percussion and auscultation bilaterally   Chest Wall:    Node formity    Heart:    Regular rate and rhythm, no murmurs       Abdomen:     Soft, non-tender, bowel sounds +, no organomegaly, no   ascites           Extremities:   Extremities no cyanosis  Dressing on right hip area and staples  No active bleeding  1+   edema right ankle, no calf tenderness       Skin:   no rash   Lymph nodes:   No palpable lymphadenopathy   Neurologic:    Slight weakness left arm and leg compared to right        Performance Status:  3    Recent Results (from the past 48 hour(s))   Fingerstick Glucose (POCT)    Collection Time: 11/26/17 12:08 AM   Result Value Ref Range    POC Glucose 204 (H) 65 - 140 mg/dl   CBC and differential    Collection Time: 11/26/17  4:11 AM   Result Value Ref Range    WBC 8 18 4 31 - 10 16 Thousand/uL    RBC 2 69 (L) 3 88 - 5 62 Million/uL    Hemoglobin 8 0 (L) 12 0 - 17 0 g/dL    Hematocrit 23 1 (L) 36 5 - 49 3 %    MCV 86 82 - 98 fL    MCH 29 7 26 8 - 34 3 pg    MCHC 34 6 31 4 - 37 4 g/dL    RDW 14 7 11 6 - 15 1 %    MPV 10 2 8 9 - 12 7 fL    Platelets 465 009 - 750 Thousands/uL    nRBC 0 /100 WBCs    Neutrophils Relative 69 43 - 75 %    Lymphocytes Relative 16 14 - 44 %    Monocytes Relative 12 4 - 12 %    Eosinophils Relative 3 0 - 6 %    Basophils Relative 0 0 - 1 %    Neutrophils Absolute 5 60 1 85 - 7 62 Thousands/µL    Lymphocytes Absolute 1 31 0 60 - 4 47 Thousands/µL    Monocytes Absolute 0 97 0 17 - 1 22 Thousand/µL    Eosinophils Absolute 0 24 0 00 - 0 61 Thousand/µL    Basophils Absolute 0 02 0 00 - 0 10 Thousands/µL   Basic metabolic panel    Collection Time: 11/26/17  4:11 AM   Result Value Ref Range    Sodium 135 (L) 136 - 145 mmol/L    Potassium 4 2 3 5 - 5 3 mmol/L    Chloride 102 100 - 108 mmol/L    CO2 26 21 - 32 mmol/L    Anion Gap 7 4 - 13 mmol/L    BUN 45 (H) 5 - 25 mg/dL    Creatinine 1 87 (H) 0 60 - 1 30 mg/dL    Glucose 160 (H) 65 - 140 mg/dL    Calcium 7 8 (L) 8 3 - 10 1 mg/dL    eGFR 33 ml/min/1 73sq m   Fingerstick Glucose (POCT)    Collection Time: 11/26/17  8:02 AM   Result Value Ref Range    POC Glucose 143 (H) 65 - 140 mg/dl   Fingerstick Glucose (POCT)    Collection Time: 11/26/17 11:21 AM   Result Value Ref Range    POC Glucose 229 (H) 65 - 140 mg/dl   ECG 12 lead    Collection Time: 11/26/17  3:17 PM   Result Value Ref Range    Ventricular Rate 74 BPM    Atrial Rate 74 BPM    MS Interval 200 ms    QRSD Interval 154 ms    QT Interval 450 ms    QTC Interval 499 ms    P Axis 31 degrees    QRS Axis 42 degrees    T Wave Axis -4 degrees   Troponin I    Collection Time: 11/26/17  3:36 PM   Result Value Ref Range    Troponin I 0 04 <=0 04 ng/mL   Fingerstick Glucose (POCT)    Collection Time: 11/26/17  4:28 PM   Result Value Ref Range    POC Glucose 363 (H) 65 - 140 mg/dl Troponin I    Collection Time: 11/26/17  6:26 PM   Result Value Ref Range    Troponin I 0 03 <=0 04 ng/mL   Fingerstick Glucose (POCT)    Collection Time: 11/26/17  9:17 PM   Result Value Ref Range    POC Glucose 230 (H) 65 - 140 mg/dl   Troponin I    Collection Time: 11/26/17  9:20 PM   Result Value Ref Range    Troponin I 0 04 <=0 04 ng/mL   CBC and Platelet    Collection Time: 11/27/17  6:45 AM   Result Value Ref Range    WBC 8 32 4 31 - 10 16 Thousand/uL    RBC 2 78 (L) 3 88 - 5 62 Million/uL    Hemoglobin 8 2 (L) 12 0 - 17 0 g/dL    Hematocrit 24 0 (L) 36 5 - 49 3 %    MCV 86 82 - 98 fL    MCH 29 5 26 8 - 34 3 pg    MCHC 34 2 31 4 - 37 4 g/dL    RDW 14 5 11 6 - 15 1 %    Platelets 145 898 - 357 Thousands/uL    MPV 9 4 8 9 - 12 7 fL   Basic metabolic panel    Collection Time: 11/27/17  6:45 AM   Result Value Ref Range    Sodium 136 136 - 145 mmol/L    Potassium 4 4 3 5 - 5 3 mmol/L    Chloride 103 100 - 108 mmol/L    CO2 27 21 - 32 mmol/L    Anion Gap 6 4 - 13 mmol/L    BUN 38 (H) 5 - 25 mg/dL    Creatinine 1 34 (H) 0 60 - 1 30 mg/dL    Glucose 145 (H) 65 - 140 mg/dL    Calcium 8 3 8 3 - 10 1 mg/dL    eGFR 50 ml/min/1 73sq m   Fingerstick Glucose (POCT)    Collection Time: 11/27/17  6:47 AM   Result Value Ref Range    POC Glucose 156 (H) 65 - 140 mg/dl   Fingerstick Glucose (POCT)    Collection Time: 11/27/17 11:21 AM   Result Value Ref Range    POC Glucose 207 (H) 65 - 140 mg/dl   Fingerstick Glucose (POCT)    Collection Time: 11/27/17  4:17 PM   Result Value Ref Range    POC Glucose 207 (H) 65 - 140 mg/dl         Ct Chest Abdomen Pelvis Wo Contrast    Result Date: 11/24/2017  Narrative: CT CHEST, ABDOMEN AND PELVIS WITHOUT IV CONTRAST INDICATION:  Injury to trunk  Recent surgery to right hip  Stroke alert  Weakness  COMPARISON: None   TECHNIQUE: CT examination of the chest, abdomen and pelvis was performed without intravenous contrast   Reformatted images were created in axial, sagittal, and coronal planes  Radiation dose length product (DLP) for this visit:  1323 mGy-cm   This examination, like all CT scans performed in the [facility], was performed utilizing techniques to minimize radiation dose exposure, including the use of iterative reconstruction and automated exposure control  Enteric contrast was not administered  FINDINGS: CHEST LUNGS:  Mild scattered atelectasis bilaterally  Airway is patent  No focal infiltrate  No pneumothorax  PLEURA:  Unremarkable  HEART/GREAT VESSELS:  The heart is mildly enlarged  Extensive coronary artery calcifications are noted  Thoracic aorta is normal caliber with mild/moderate wall calcification  MEDIASTINUM AND JAYDA:  Unremarkable  CHEST WALL AND LOWER NECK:   Focal skin thickening noted anterior to the sternum just right of midline measuring up to 1 6 cm  Correlate clinically for skin lesion  Rounded cyst more inferiorly anterior to the sternum measuring up to 2 2 cm possibly a sebaceous cyst     ABDOMEN Somewhat suboptimal evaluation of the upper abdomen due to streak artifact from the patient's arms  LIVER/BILIARY TREE:  Unremarkable  GALLBLADDER:  No calcified gallstones  No pericholecystic inflammatory change  SPLEEN:  Linear calcification at the inferior aspect of the spleen within a cleft, question related to old injury  PANCREAS:  Unremarkable  ADRENAL GLANDS:  Unremarkable  KIDNEYS/URETERS:  No hydronephrosis  Excreted contrast material within the collecting systems bilaterally  2 7 cm hypodense lesion in the left kidney middle pole posteriorly does not measure simple fluid attenuation which could be secondary to streak artifact  STOMACH AND BOWEL:  Limited evaluation without contrast    Grossly unremarkable  APPENDIX:  No findings to suggest appendicitis  ABDOMINOPELVIC CAVITY:  No ascites or free intraperitoneal air  No lymphadenopathy  VESSELS:  Dense wall calcification along the aorta and arteries   PELVIS Limited evaluation of the pelvis due to streak artifact from bilateral hip replacement hardware  REPRODUCTIVE ORGANS:  Prostate is not well visualized due to streak artifact from bilateral hip replacement hardware  URINARY BLADDER:  Excreted contrast within the lumen  Tiny amount of intraluminal air, could be related to recent catheterization, infection is not excluded  ABDOMINAL WALL/INGUINAL REGIONS:  Soft tissue swelling and patchy gas adjacent to the right hip compatible with recent surgery as per history  Collection in the right hip lateral subcutaneous tissues measures 4 6 x 2 8 cm, with scattered gas droplets, slightly hyperdense  OSSEOUS STRUCTURES:  Bilateral hip replacement hardware identified  The bones are demineralized  Degenerative spurring is noted along the spine  Old healed rib fractures on the left  No acute fracture  Impression: 1  Soft tissue swelling and patchy gas adjacent to the right hip compatible with recent surgery as per history  Collection in the right hip lateral  subcutaneous tissues measures 4 6 x 2 8 cm, slightly hyperdense, likely a small postoperative collection/hematoma with small droplets of air  This could be followed with ultrasound if there is clinical concern for interval enlargement  2   2 7 cm hypodense lesion in the left kidney middle pole posteriorly does not measure simple fluid attenuation  This could be a result of streak artifact  Recommend follow-up with renal ultrasound nonemergently  3   Tiny amount of air in the bladder lumen, nonspecific could be related to recent catheterization, underlying infection is not excluded  Correlate clinically  4   Focal skin thickening noted anterior to the sternum just right of midline measuring up to 1 6 cm  Correlate clinically for skin lesion    Rounded cyst more inferiorly anterior to the sternum measuring up to 2 2 cm possibly a sebaceous cyst  Workstation performed: [id number]     Xr Hip/pelv 2-3 Vws Right If Performed    Result Date: 11/25/2017  Narrative: RIGHT HIP INDICATION:  RIGHT revision  COMPARISON: Recent CT scanogram  VIEWS: AP pelvis and 2 coned down views of the hip IMAGES:  4 FINDINGS: RIGHT hip replacement hardware appears seated and intact  No evidence for loosening  Cerclage wires noted about the proximal femoral hardware  Greater trochanter fragment displaced laterally, stable compared to prior  Overlying postoperative changes seen in the upper lateral 5 consisting of soft tissue emphysema and skin staples  Prior LEFT hip replacement also noted  Contrast excretion seen within the bladder  Multilevel degenerative changes within the lumbar spine  Diffuse osteopenia  Impression: Bilateral hip replacement as above  No acute abnormality evident  Workstation performed: [id number]     Ct Head Wo Contrast    Result Date: 11/25/2017  Narrative: CT BRAIN - WITHOUT CONTRAST INDICATION:  Stroke COMPARISON:  CT 11/24/2017 TECHNIQUE:  CT examination of the brain was performed  In addition to axial images, coronal reformatted images were created and submitted for interpretation  Radiation dose length product (DLP) for this visit:  1002 77 mGy-cm   This examination, like all CT scans performed in the [facility], was performed utilizing techniques to minimize radiation dose exposure, including the use of iterative reconstruction and automated exposure control  IMAGE QUALITY:  Diagnostic  FINDINGS:  PARENCHYMA:  No intracranial hemorrhage, mass, mass effect or edema  Diffuse parenchymal changes consistent with chronic small vessel disease are reidentified  These are present with advanced degree  No definite change in the CT appearance of the parenchyma since prior study  Scattered vascular calcifications  There is no parenchymal hemorrhage  VENTRICLES AND EXTRA-AXIAL SPACES:  Normal for patient's age  VISUALIZED ORBITS AND PARANASAL SINUSES:  Unremarkable   CALVARIUM AND EXTRACRANIAL SOFT TISSUES: Normal      Impression: No acute intracranial abnormality  Stable CT appearance of the brain  Age-appropriate volume loss, advanced chronic small vessel disease  Workstation performed: [id number]     Ct Head Wo Contrast    Result Date: 11/24/2017  Narrative: CT BRAIN - WITHOUT CONTRAST INDICATION:  Headache after TPA  COMPARISON:  Head CT 11/24/2017 1014 hours TECHNIQUE:  CT examination of the brain was performed  In addition to axial images, coronal reformatted images were created and submitted for interpretation  Radiation dose length product (DLP) for this visit:  969 mGy-cm   This examination, like all CT scans performed in the [facility], was performed utilizing techniques to minimize radiation dose exposure, including the use of iterative reconstruction and automated exposure control  IMAGE QUALITY:  Diagnostic  FINDINGS: Head CT exam performed on 11/24/2017 1320 hours  PARENCHYMA:  Patchy decreased attenuation is noted in the supratentorial white matter demonstrating an appearance most consistent with moderate microangiopathic change  No intracranial mass, mass effect or midline shift  No CT signs of acute infarction  There is no parenchymal hemorrhage  VENTRICLES AND EXTRA-AXIAL SPACES:  Age-appropriate volume loss is noted  No hydrocephalus  VISUALIZED ORBITS AND PARANASAL SINUSES:  Unremarkable  CALVARIUM AND EXTRACRANIAL SOFT TISSUES:   Normal      Impression: No acute intracranial abnormality  Stable head CT from earlier the same day  Workstation performed: [id number]     Mri Brain Wo Contrast    Result Date: 11/25/2017  Narrative: MRI BRAIN WITHOUT CONTRAST INDICATION:  CVA, recent right THR COMPARISON:   CT performed earlier same day TECHNIQUE:  Sagittal T1, axial T2, axial FLAIR, axial T1, axial [place] and axial diffusion imaging  IMAGE QUALITY:  Diagnostic  FINDINGS: BRAIN PARENCHYMA:  Diffuse generalized volume loss, enlargement of all CSF-containing spaces    No acute ischemia, intracranial mass, mass effect or edema  Moderate degree of chronic small vessel disease  Tiny focus of hemosiderin deep in the left frontal white matter also, right upper midbrain  These could represent sequela of small cavernous angiomas or microvascular hemorrhages  VENTRICLES:  The ventricles are normal in size and contour  SELLA AND PITUITARY GLAND:  Normal  ORBITS:  Normal  PARANASAL SINUSES:  Normal  VASCULATURE:  Evaluation of the major intracranial vasculature demonstrates appropriate flow voids  CALVARIUM AND SKULL BASE:  Normal  EXTRACRANIAL SOFT TISSUES:  Normal      Impression: No acute disease  Diffuse generalized volume loss, moderate degree of chronic small vessel disease  Workstation performed: [id number]     Xr Stroke Alert Portable Chest    Result Date: 11/24/2017  Narrative: CHEST INDICATION:  Stroke alert COMPARISON:  1/9/2014 VIEWS:   AP frontal IMAGES:  1 FINDINGS:     Cardiomediastinal silhouette appears unremarkable  The lungs are clear  No pneumothorax or pleural effusion  Visualized osseous structures appear within normal limits for the patient's age  Impression: No active pulmonary disease  Workstation performed: [id number]     Ct Stroke Alert Brain    Result Date: 11/24/2017  Narrative: CT BRAIN - STROKE ALERT PROTOCOL INDICATION:  Stroke alert COMPARISON:  None  TECHNIQUE:  CT examination of the brain was performed  In addition to axial images, coronal reformatted images were created and submitted for interpretation  Radiation dose length product (DLP) for this visit:  949 mGy-cm   This examination, like all CT scans performed in the [facility], was performed utilizing techniques to minimize radiation dose exposure, including the use of iterative reconstruction and automated exposure control  IMAGE QUALITY:  Diagnostic  FINDINGS:  PARENCHYMA:  No intracranial mass, mass effect or midline shift  No acute intracranial hemorrhage   No CT signs of acute infarction  Moderate periventricular and white matter hypodensities are seen likely due to chronic small vessel ischemic changes There are no CT signs of acute territorial infarct  Atherosclerotic constipation seen in the carotids VENTRICLES AND EXTRA-AXIAL SPACES:  Normal for patient's age  VISUALIZED ORBITS AND PARANASAL SINUSES:  Unremarkable  CALVARIUM AND EXTRACRANIAL SOFT TISSUES:   Normal      Impression: No acute intracranial hemorrhage seen Moderate periventricular and white matter hypodensity seen likely due to chronic small vessel ischemic changes Findings were directly discussed with John Knight on 11/24/2017 10:20 AM  Workstation performed: [id number]     Vas Upper Limb Venous Duplex Scan, Unilateral/limited    Result Date: 11/25/2017  Narrative:  [facility] REPORT CLINICAL: Indications:  Swelling [M79 [id number]]  Swelling of left forearm  FINDINGS:  Left        Impression              Subclavian  Normal (Patent)         Brachial    Occlusive Subsegmental     CONCLUSION:  Impression RIGHT UPPER LIMB LIMITED: Evaluation shows no evidence of thrombus in the internal jugular vein, subclavian vein, and the brachiocephalic vein  LEFT UPPER LIMB: Evidence of acute occlusive deep vein thrombosis in a single branch of the brachial veins  Thrombus extends from upper arm to approximately mid arm  No evidence of superficial thrombophlebitis noted  Doppler evaluation shows a normal response to augmentation maneuvers  Results given to ICU nurse and doctor at the desk at 1500  SIGNATURE: Electronically Signed by: Emmanuel Galeana on 2017-11-25 09:28:16 AM    Us Extremity Soft Tissue    Result Date: 11/24/2017  Narrative: ULTRASOUND RIGHT HIP/ANTERIOR THIGH TECHNIQUE: Real-time grayscale and color Doppler examination of the right upper thigh was performed with a 4 MHz curvilinear transducer, beginning anteriorly proceeding laterally  INDICATION:  Abnormal CT scan   COMPARISON:  CT performed November 24, 2017 at 1323 hours  FINDINGS: Ultrasound images demonstrated only diffuse edema also obtained as fat  No obvious focal fluid collections could be confirmed on this exam         Impression: No obvious focal fluid collections could be confirmed on this exam  Recommend interval follow-up CT if there is clinical concern for enlarging hematoma Or abscess Workstation performed: [id number]     Cta Stroke Alert (head/neck)    Result Date: 11/24/2017  Narrative: CTA NECK AND BRAIN WITH AND WITHOUT CONTRAST INDICATION: Left-sided weakness COMPARISON:   None  TECHNIQUE:  Routine CT imaging of the Brain without contrast   Post contrast imaging was performed after administration of iodinated contrast through the neck and brain  Post contrast axial 0 625 mm images timed to opacify the arterial system  3D rendering was performed on an independent workstation  MIP reconstructions performed  Coronal reconstructions were performed of the noncontrast portion of the brain  Radiation dose length product (DLP) for this visit:  385 mGy-cm   This examination, like all CT scans performed in the [facility], was performed utilizing techniques to minimize radiation dose exposure, including the use of iterative reconstruction and automated exposure control  IV Contrast:  85 mL of iohexol (OMNIPAQUE)     IMAGE QUALITY:   Diagnostic FINDINGS: NONCONTRAST BRAIN PARENCHYMA:  No intracranial mass, mass effect or midline shift  No acute intracranial hemorrhage  No CT signs of acute infarction  VENTRICLES AND EXTRA-AXIAL SPACES:  Normal for patient's age  VISUALIZED ORBITS AND PARANASAL SINUSES:  Unremarkable  CALVARIUM AND EXTRACRANIAL SOFT TISSUES:   Normal  CERVICAL VASCULATURE AORTIC ARCH AND GREAT VESSELS:  There are scattered areas of foot calcific plaquing noted in the arch   The brachiocephalic artery is patent There is mild calcific plaquing seen at the region of the left subclavian artery without significant stenosis There is moderate to calcific plaquing seen at the region of the right subclavian artery  RIGHT VERTEBRAL ARTERY CERVICAL SEGMENT: The right vertebral artery demonstrates marked decrease in its caliber  A small focal area of  calcific plaquing is noted  There is moderate-sized segment of the cervical portion of the right vertebral artery which does not demonstrate to contrast enhancement extending from C3 to the C1 level  The V3 segment of the right vertebral artery isn't identified  The intracranial portion of the right vertebral artery is also decreased caliber LEFT VERTEBRAL ARTERY CERVICAL SEGMENT:  Normal origin  There is small focal area of small focal area of calcific plaquing in the left vertebral artery in the V3 segment with mild less than 50% narrowing  RIGHT EXTRACRANIAL CAROTID SEGMENT:  Normal caliber common carotid artery  Normal bifurcation and cervical internal carotid artery  There is no significant stenosis seen in the right internal carotid artery  There is medialization of the right common  carotid artery which lies in the posterior pharyngeal wall  There is moderate calcific plaquing seen with narrowing in the range of 50% in the right external carotid artery There is no significant narrowing seen within the right internal carotid artery LEFT EXTRACRANIAL CAROTID SEGMENT:  Normal caliber common carotid artery  Normal bifurcation and cervical internal carotid artery  There is mild distal left and 50% narrowing in the right common carotid artery    NASCET criteria was used to determine the degree of internal carotid artery diameter stenosis  INTRACRANIAL VASCULATURE INTERNAL CAROTID ARTERIES:  Right internal carotid artery demonstrates a episodic calcification in the cavernous portion without significant narrowing   The left internal carotid artery demonstrates moderate calcific plaquing in the cavernous portion with narrowing in the range of  50% ANTERIOR CIRCULATION: The right A1 segment is smaller  The left A1 segment is mildly larger   MIDDLE CEREBRAL ARTERY CIRCULATION:  The right M1 segment is patent  There is a mild narrowing at the region of the superior division of the right MCA There is focal area of foot high-grade narrowing with the stenosis in the range of 90% at the region of the inferior division of the right MCA  There is moderate narrowing in the range of 50% at the region of the left M1 segment Mild disease is also noted within the inferior division of the left MCA DISTAL VERTEBRAL ARTERIES:  There is small segment of foot moderate to calcific plaquing within the left vertebral artery in its intracranial portion  The before segment of the right vertebral artery is also small caliber There is atherosclerotic disease noted within the V3 segment of the right vertebral artery BASILAR ARTERY:  There is moderate to severe disease seen in the proximal to mid segment of the basilar artery POSTERIOR CEREBRAL ARTERIES: There is moderate disease noted within the both posterior cerebral arteries with multifocal areas mild narrowing DURAL VENOUS SINUSES:  Normal  NON VASCULAR ANATOMY BONY STRUCTURES:  There are degenerative changes noted within the cervical spine with reversal of the cervical lordosis with the central canal narrowing at the C3-4 level and the C4-5 level and C5-6 level SOFT TISSUES OF THE NECK:  Normal         THORACIC INLET:  Unremarkable       Impression: There is no large vessel occlusion noted within the intracranial circulation There is a small focal area of foot severe stenosis at the region of the inferior division of the right MCA with narrowing in the range of  90% There is moderate to severe atherosclerotic disease with the narrowing in the proximal to midportion of the basilar artery There is lack of enhancement noted within the small segment of the distal cervical portion of the right vertebral artery with the markedly decreased caliber of the visualized portion of the cervical vertebral artery  This may be due to total or subtotal  occlusion of the right vertebral artery  No significant carotid stenosis Central canal stenosis of the cervical spine at C3-4 and C4-5 level  I personally discussed this study with Samanta Phan on 11/24/2017 10:33 AM   I personally discussed this study with Dr Diandra Ace on 11/24/2017 11:10 AM   Workstation performed: [id number]       Assessment:  Reviewed the lab results  Normal baseline PT/PTT and platelets  Increased fibrinogen, acute phase reactant  Anemia secondary to acute blood loss  Right hip hematoma-stable  CVA and neurologically improving  DVT left upper arm and patient is on Lovenox    Plan:  Monitor condition and counts  To monitor platelets on Lovenox  It could be tried on intravenous Venofer because of acute blood loss anemia  He could have oral iron post discharge 1 tablet daily for 1 month  Discussed with patient and explained  Questions answered  To find out cause of DVT had an unusual site, left upper arm  Seymour Spruce He could have thrombosis panel  Also to rule out any underlying malignancy  Some abnormality in left kidney  When improved he could have ultrasound or MRI scan if creatinine improved  Discussed with patient and explained  Questions answered

## 2017-11-27 NOTE — PROGRESS NOTES
Cardiology Progress Note - Dimas Bonds 80 y o  male MRN: [id number]    Unit/Bed#: [facility] 705-01 Encounter: [id number]      Assessment:  1  TIA  2  CAD s/p PCI to rPDA 02/2016, LAD 2008  3  Preserved LV systolic function  4  Hypertension  5  Dyslipidemia  6  Left upper extremity DVT    Plan:  1  Troponin negative, echo noted - patient with no symptoms  2  Continue aspirin, can discontinue Plavix from cardiac perspective but on it given #1  3  Continue current anti-hypertensive medications, resume doxazosin, not on statin due to "hives"  4  Follow-up with Dr Shreyas Jansen upon discharge  5  [facility] will be available, please call with questions     Subjective:   Patient seen and examined  No significant events overnight  Objective:     Vitals: Blood pressure (!) 189/89, pulse 72, temperature 98 7 °F (37 1 °C), temperature source Oral, resp  rate 18, height 5' 10 5" (1 791 m), weight 106 kg (233 lb 4 oz), SpO2 95 %  , Body mass index is 32 99 kg/m² , Orthostatic Blood Pressures    Flowsheet Row Most Recent Value   Blood Pressure   189/89 filed at 11/27/2017 1120   Patient Position - Orthostatic VS  Lying filed at 11/27/2017 0717            Intake/Output Summary (Last 24 hours) at 11/27/17 1133  Last data filed at 11/27/17 0840   Gross per 24 hour   Intake              980 ml   Output              925 ml   Net               55 ml           Physical Exam:    GEN: Dimas Bonds appears well, alert and oriented x 3, pleasant and cooperative   HEENT: pupils equal, round, and reactive to light; extraocular muscles intact  NECK: supple, no carotid bruits   HEART: regular rhythm, normal S1 and S2, no murmurs, clicks, gallops or rubs   LUNGS: clear to auscultation bilaterally; no wheezes, rales, or rhonchi   ABDOMEN: normal bowel sounds, soft, no tenderness, no distention  EXTREMITIES: peripheral pulses normal; no clubbing, cyanosis, or edema  NEURO: no focal findings   SKIN: normal without suspicious lesions on exposed skin    Medications:      Current Facility-Administered Medications:     acetaminophen (TYLENOL) tablet 975 mg, 975 mg, Oral, Q8H [address], Schuyler Morenoman, DO, 975 mg at 11/27/17 1034    amLODIPine (NORVASC) tablet 10 mg, 10 mg, Oral, Daily, Irving Lovell DO, 10 mg at 11/27/17 0847    aspirin chewable tablet 81 mg, 81 mg, Oral, Daily, John Zavaleta, DO, 81 mg at 11/27/17 0847    clopidogrel (PLAVIX) tablet 75 mg, 75 mg, Oral, Daily, Irving Lovell DO, 75 mg at 11/27/17 0847    docusate sodium (COLACE) capsule 100 mg, 100 mg, Oral, BID, John Reason, DO, 100 mg at 11/27/17 1033    enoxaparin (LOVENOX) subcutaneous injection 105 mg, 1 mg/kg, Subcutaneous, Q12H [address], John Reason, DO, 105 mg at 11/27/17 1034    influenza inactivated quadrivalent vaccine (FLULAVAL) IM injection 0 5 mL, 0 5 mL, Intramuscular, Prior to discharge, Karen Izaguirre MD    insulin glargine (LANTUS) subcutaneous injection 10 Units, 10 Units, Subcutaneous, HS, John Reason, DO    insulin lispro (HumaLOG) 100 units/mL subcutaneous injection 1-6 Units, 1-6 Units, Subcutaneous, HS, Chan Carney, DO, 3 Units at 11/26/17 2118    insulin lispro (HumaLOG) 100 units/mL subcutaneous injection 2-12 Units, 2-12 Units, Subcutaneous, TID AC, 4 Units at 11/27/17 1124 **AND** Fingerstick Glucose (POCT), , , 4x Daily AC and at bedtime, Chan Carney, DO    iohexol (OMNIPAQUE) 350 MG/ML injection (MULTI-DOSE) 85 mL, 85 mL, Intravenous, Once in imaging, Time JALEEL Roper    lisinopril (ZESTRIL) tablet 40 mg, 40 mg, Oral, Daily, John Reason, DO, 40 mg at 11/27/17 1033    metoprolol (LOPRESSOR) injection 5 mg, 5 mg, Intravenous, Q6H PRN, Time CRISTOPHER RoperNP, 5 mg at 11/27/17 1126    metoprolol tartrate (LOPRESSOR) tablet 25 mg, 25 mg, Oral, Q12H [address], Iva Olmstead DO, 25 mg at 11/27/17 0847    ondansetron (ZOFRAN) injection 4 mg, 4 mg, Intravenous, Q4H PRN, Chan Carney DO    oxyCODONE (ROXICODONE) immediate release tablet 10 mg, 10 mg, Oral, Q4H PRN, Colan Ismael, DO, 10 mg at 11/27/17 1034    oxyCODONE (ROXICODONE) IR tablet 5 mg, 5 mg, Oral, Q4H PRN, Wyvonna Blind, DO, 5 mg at 11/27/17 0902    polyethylene glycol (MIRALAX) packet 17 g, 17 g, Oral, Daily PRN, Pamila Adie, DO    senna (SENOKOT) tablet 8 6 mg, 1 tablet, Oral, HS, Irving Lovell, DO, 8 6 mg at 11/26/17 2114     Labs & Results:      Results from last 7 days  Lab Units 11/26/17  2120 11/26/17  1826 11/26/17  1536   TROPONIN I ng/mL 0 04 0 03 0 04       Results from last 7 days  Lab Units 11/27/17  0645 11/26/17  0411 11/25/17  1209 11/25/17  0428   WBC Thousand/uL 8 32 8 18  --  8 65   HEMOGLOBIN g/dL 8 2* 8 0* 8 7* 8 9*   HEMATOCRIT % 24 0* 23 1* 24 8* 24 8*   PLATELETS Thousands/uL 193 156  --  134*       Results from last 7 days  Lab Units 11/25/17  0428   CHOLESTEROL mg/dL 92   TRIGLYCERIDES mg/dL 107   HDL mg/dL 28*       Results from last 7 days  Lab Units 11/27/17  0645 11/26/17  0411 11/25/17  0428  11/23/17  0620   SODIUM mmol/L 136 135* 135*  < > 136   POTASSIUM mmol/L 4 4 4 2 4 3  < > 4 0   CHLORIDE mmol/L 103 102 103  < > 104   CO2 mmol/L 27 26 25  < > 25   BUN mg/dL 38* 45* 35*  < > 28*   CREATININE mg/dL 1 34* 1 87* 1 50*  < > 1 47*   CALCIUM mg/dL 8 3 7 8* 7 8*  < > 8 9   TOTAL PROTEIN g/dL  --   --  5 4*  --  6 2*   BILIRUBIN TOTAL mg/dL  --   --  1 27*  --  0 40   ALK PHOS U/L  --   --  33*  --  40*   ALT U/L  --   --  13  --  14   AST U/L  --   --  26  --  30   GLUCOSE RANDOM mg/dL 145* 160* 172*  < > 152*   GLUCOSE, ISTAT   --   --   --   < >  --    < > = values in this interval not displayed  Results from last 7 days  Lab Units 11/24/17  1519 11/24/17  1009   INR  1 09 1 07   PTT seconds 31 44*       Results from last 7 days  Lab Units 11/25/17  0428   MAGNESIUM mg/dL 2 1       Counseling / Coordination of Care  Total floor / unit time spent today 25  minutes    Greater than 50% of total time was spent with the patient and / or family counseling and / or coordination of care

## 2017-11-27 NOTE — PLAN OF CARE
Problem: OCCUPATIONAL THERAPY ADULT  Goal: Performs self-care activities at highest level of function for planned discharge setting  See evaluation for individualized goals  Treatment Interventions: ADL retraining, Functional transfer training, UE strengthening/ROM, Endurance training          See flowsheet documentation for full assessment, interventions and recommendations  Outcome: Progressing  Limitation: Decreased ADL status, Decreased UE strength, Decreased Safe judgement during ADL, Decreased endurance, Decreased fine motor control, Decreased self-care trans  Prognosis: Good  Assessment: PLEASE SEE BELOW  Recommendation: PT consult  OT Discharge Recommendation: Short Term Rehab  OT - OK to Discharge:  (TO STR AT THIS TIME)    PT SEEN FOR OT TXMENT SESSION FOCUSED ON BED MOBILITY, FUNCTIONAL SITTING BALANCE, ACTIVITY TOLERANCE, REVIEW OF TOTAL HIP PRECAUTIONS, FUNCTIONAL TRANSFERS/AMBULATION, LB DRESSING, COGNITIVE RE-ORIENTATION AS WELL A COMPLETION OF FUNCTIONAL RECOVERY INDECES TO IDENTIFY LONG TERM PROGNOSTIC OUTCOME POTENTIAL AND ANXIETY/DEPRESSION SCREENS  PT REQUIRES MOD A X2 TO COMPLETE BED MOBILITY AND MIN/MOD A TO MAINTAIN SITTING BALANCE AT EOB  PT W/ L LATERAL LEAN 2* R HIP PAIN  PT REQUIRED MAX A TO DON SOCKS THIS AM W/ INCREASED ASSISTANCE TO RAISE RLE OFF BED SURFACE 2* PAIN  PT W/ POOR CARRYOVER OF TOTAL HIP PRECAUTIONS THIS AM - PT RE-EDUCATED AND 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  PT DOES DEMONSTRATE IMPAIRED INSIGHT/JUDGEMENT INTO CURRENT DEFICITS AS PT STATED "I'M HERE FOR A HEART ATTACK" PT ADD'L REQUIRES CUES FOR SAFETY T/O FUNCTIONAL ACTIVITY  PT REQUIRED MOD A X2 TO COMPLETE SIT>STAND TRANSFERS AND SHORT DISTANCE AMBULATION USING RW  PT REQUIRES CUES FOR LE ADVANCEMENT UPON ACHIEVING STANCE  UPON SITTING IN CHAIR, PT ENGAGED IN [place] PROGNOSTIC SCALE TO ASSESS UE MOTOR DEFICIT, PROPRIOCEPTION, BALANCE AND COGNITION   PT SCORED 3 6 INDICATIVE OF MODERATE TO MODERATELY SEVERE IMPAIRMENTS DEMONSTRATING GOOD REHAB POTENTIAL (SCORE <3 2 INDICATE HIGH LIKELIHOOD OF RETURNING HOME, SCORES BETWEEN 3 2-5 2 GENERALLY RESPOND BETTER TO REHAB AND SCORES OF >5 2 ARE TYPICALLY DEPENDENT WITH AN INCREASED RISK FOR INSTITUTIONALIZATION  PT FURTHER ENGAGED IN MOTRICITY INDEX TO ASSESS UE/LE FUNCTION SCORING 88 5/100 DEMONSTRATING MILD TO MODERATE IMPAIRMENTS IN UE/LE STRENGTH  PT ADDITIONALLY ENGAGED IN THE TRUNK CONTROL TEST TO ASSESS POTENTIAL FOR FULL FUNCTIONAL MOBILITY RECOVERY SCORING 0/100 (0 BEING DEPENDENT  BEING INDEPENDENT) DEMONSTRATING SEVERE DEFICITS  PT FINALLY ENGAGED IN NEURO QOL ANXIETY SCREEN SCORING 18/40 INDICATIVE OF MILD ANXIOUS S/S AND NEURO QOL DEPRESSION SCREEN SCORING 10/40 INDICATIVE OF MILD DEPRESSIVE S/S (THRESHOLD FOR TREATMENT 25+ PER NEUROLOGY/SLIM)  PT EDUCATED ON OUTCOME MEASURES AND FUNCTIONAL IMPLICATIONS OF SAME  PT REMAINS LIMITED 2* IMPAIRED BALANCE, ACTIVITY TOLERANCE, MEDICAL STATUS, GENERALIZED WEAKNESS/DECONDITIONING, MINIMAL LUE STRENGTH/COORDINATION DEFICITS, PAIN, WBS, TOTAL HIP PRECAUTIONS, ADL IMPAIRMENTS AS WELL AS IMPAIRED ATTENTION, INSIGHT, JUDGEMENT, ORIENTATION AND PROCESSING  CONTINUE TO RECOMMEND INPATIENT REHAB AT D/C  WILL CONTINUE TO FOLLOW PT ON CASELOAD IN ORDER TO MEET PREVIOUSLY ESTABLISHED GOALS

## 2017-11-27 NOTE — PLAN OF CARE
Problem: SLP ADULT - SWALLOWING, IMPAIRED  Goal: Advance to least restrictive diet without signs or symptoms of aspiration for planned discharge setting  See evaluation for individualized goals  Patient will:    1  Tolerate level 3 diet and thin liquids without s/s of aspiration or dysphagia  2  Pt will tolerate regular trials with SLP without s/s of oral or pharyngeal dysphagia        Outcome: Progressing

## 2017-11-27 NOTE — CASE MANAGEMENT
Initial Clinical Review    Admission: Date/Time/Statement: 11/24/17 @ 1249     Orders Placed This Encounter   Procedures    Inpatient Admission (expected length of stay for this patient is greater than two midnights)     Standing Status:   Standing     Number of Occurrences:   1     Order Specific Question:   Admitting Physician     Answer:   Bettie Coleman     Order Specific Question:   Level of Care     Answer:   Critical Care [15]     Order Specific Question:   Estimated length of stay     Answer:   More than 2 Midnights     Order Specific Question:   Certification     Answer:   I certify that inpatient services are medically necessary for this patient for a duration of greater than two midnights  See H&P and MD Progress Notes for additional information about the patient's course of treatment  ED: Date/Time/Mode of Arrival:   ED Arrival Information     Expected Arrival Acuity Means of Arrival Escorted By Service Admission Type    - 11/24/2017 09:53 Emergent Stretcher Other General Medicine Emergency    Arrival Complaint    weakness          Chief Complaint:   Chief Complaint   Patient presents with    CVA/TIA-like Symptoms     pt c/o left arm weakness since this morning  History of Illness: Ratna Wade is a 80 y o  male with past medical history significant for CAD with most recent stenting in March of this year, hypertension, diabetes type 2 who presents to the emergency room from [place] [id number] 4 days post hardware removal and total right hip replacement with garbled speech and left-sided weakness that started around 9:25 this morning  Stroke alert was called at 10:11 a m , and was seen by Neurology at 10:15 a m     CT head without acute bleed  Risks and benefits of thrombolytics were discussed with the patient's family by Neurology and the emergency room    Patient family accept the risk of hemorrhage and/or death related to the administration of tPA in the related postop basilar artery  There is lack of enhancement noted within the small segment of the distal cervical portion of the right vertebral artery with the markedly decreased caliber of the visualized portion of the cervical vertebral artery   This may be due to total or subtotal   occlusion of the right vertebral artery  No significant carotid stenosis    Central canal stenosis of the cervical spine at C3-4 and C4-5 level   UR R hip/ant thigh: No obvious focal fluid collections could be confirmed on this exam   Recommend interval follow-up CT if there is clinical concern for enlarging hematoma Or abscess  CT head: No acute intracranial abnormality     Stable head CT from earlier the same day  CT chest a&p: 1      Soft tissue swelling and patchy gas adjacent to the right hip compatible with recent surgery as per history   Collection in the right hip lateral  subcutaneous tissues measures 4 6 x 2 8 cm, slightly hyperdense, likely a small postoperative   collection/hematoma with small droplets of air   This could be followed with ultrasound if there is clinical concern for interval enlargement  2   2 7 cm hypodense lesion in the left kidney middle pole posteriorly does not measure simple fluid attenuation   This could be a result of streak artifact   Recommend follow-up with renal ultrasound nonemergently  3   Tiny amount of air in the bladder lumen, nonspecific could be related to recent catheterization, underlying infection is not excluded   Correlate clinically    4   Focal skin thickening noted anterior to the sternum just right of midline measuring up to 1 6 cm   Correlate clinically for skin lesion   Rounded cyst more inferiorly anterior to the sternum measuring up to 2 2 cm possibly a sebaceous cyst     ED Treatment:   Medication Administration from 11/24/2017 0953 to 11/24/2017 1333       Date/Time Order Dose Route Action Action by Comments     11/24/2017 1232 alteplase (ACTIVASE) bolus 9 mg 9 mg Intravenous Given Uli Ríos RN      11/24/2017 1233 alteplase (ACTIVASE) infusion 81 mg 81 mg Intravenous [address] Uli Ríos RN      11/24/2017 1234 sodium chloride 0 9 % infusion 75 mL/hr Intravenous Adan Anderson RN           Past Medical/Surgical History: Active Ambulatory Problems     Diagnosis Date Noted    No Active Ambulatory Problems     Resolved Ambulatory Problems     Diagnosis Date Noted    No Resolved Ambulatory Problems     Past Medical History:   Diagnosis Date    CAD (coronary artery disease)     DM2 (diabetes mellitus, type 2) ([place] [id number] )     HLD (hyperlipidemia)     HTN (hypertension)        Admitting Diagnosis: Weakness [R53 1]  Left-sided weakness [R53 1]  Hip fx, right, sequela [S72 001S]    Age/Sex: 80 y o  male       Assessment:       Patient Active Problem List   Diagnosis    Stroke ([place] [address] )    HTN (hypertension)    DM2 (diabetes mellitus, type 2) ([place] [address] )    HLD (hyperlipidemia)    CAD (coronary artery disease)    Postoperative anemia               Plan:                  Neuro:  Serial neuro exams  TPA protocol  Swallow eval   Neurology following  Unable to start statins secondary to patient's admission of hives related to same  Repeat head CT if neurologic changes or concern for head bleed                  CV:  Continue Lopressor and Ace inhibitors  Aspirin and Plavix to be continued 24 hours post tPA if cleared by Neurology, for stent management  2D echo ordered                  Pulm:  No acute issues identified  Encourage incentive spirometry to prevent atelectasis                 GI:  No issues identified at present                  :  No issues identified at present                  F/E/N:  NPO at present, will require a swallow evaluation  Normal saline maintenance fluids for now                  ID:  No acute issues identified at present                  Heme:  Hemoglobin 8 2 in the setting of recent surgery    Will provide 1 unit of packed red blood cells due to coronary artery disease                  Endo:  Accu-Cheks AC and HS for now  Will consider restarting home anti diabetic agents in a m   Insulin coverage for now as needed                  Msk/Skin:  Orthopedics consulted for recent right hip replacement  Patient's family has requested that he not receive narcotics, therefore making Tylenol his only option at present                  Disposition:  Admit patient to ICU for close monitoring     Given critical illness, patient length of stay will require greater than two midnights  Admission Orders:  TPA  Vital signs: every 15 minutes x 2 hours, then every 30 minutes x 6 hours, then every 60 minutes x 16 hours, then per unit protocol  Hourly neuro checks  Keep sat >92%  Dysphagia eval  Cons neuro  Cons ortho  I/O  Strict bedrest  NPO  SCD's  Echo  Cons PT/OT/speech  Cons physical med & rehab  Incentive spirom hourly wa  Baseline NIH stroke scale  No aspirin, warfarin, heparin, low molecular weight heparin, dipyridamole, clopidigrel, non-steroid anti-inflammatory agents for 24 hours following thrombolytic infusion  PER NEURO 11/24  Assessment:  1  Acute lacunar CVA most likely secondary to small vessel disease  2  History of diabetes and hyperlipidemia  3  History of right hip replacement 4 days ago with significant anemia      Plan: At this time a lengthy discussion occurred with the patient and his family at the bedside, regarding the possibility of intravenous tPA, risk of bleeding, and that the face patient fits into the criteria except for his right hip replacement and his low hemoglobin and hematocrit  The case was also discussed with our stroke specialist, patient's NIH score stroke scale is at 6, CTA of the head showed no evidence of any acute thrombus mild stenosis of the right posterior division of the MCA was noted but no evidence of any significant intracranial or extracranial carotid artery stenosis    The official report is still pending      After lengthy discussion with the family explaining to them all the pros and cons on the risk of bleeding including at the surgical site, the low hemoglobin, and close monitoring it was decided the visual proceed with IV tPA at the present time  Patient is two hours down from the onset of symptoms and his examination was read performed by myself, his NIH score is at 5-6 with mild improvement of his left leg weakness  He has persistent dysarthria, right upper extremity weakness, dysmetria, and mild right leg weakness  Family and patient is very aware of the complications that could occur as a result of the IV tPA and would like to proceed with the same  Patient will also need orthopedic evaluation due to his recent postop right hip surgery and has been experiencing pain in the left shoulder since this morning  He may need IV blood transfusion and stool guaiac will be performed prior to tPA  Patient will be closely monitored in the ICU and would recommend an MRI of the brain in a m  unless contraindicated and will follow IV tPA protocol and stroke pathway  Will hold aspirin and Plavix at this time  PER ORTHO 11/24:  Assessment:  80 y o male with recent right hip total revision 3 days ago by [facility]  During early rehab patient was noted to have stroke and was given tPA in ER  Patient now with increased pain and swelling in the right thigh  Patient with cardiac history , recent stroke with tPA administered and had total hip revision few days ago  Patient with medical comorbidity and would benefit from transfer to [facility] to be monitored in case patient does develop compartment syndrome over the weekend       Plan:   · WBAT right lower extremity  · PT/OT  · Pain control per medical team  · Please contact operating surgeon to make them aware of patients condition  · He will follow up with his surgeon for hip aftercare after discharge  · Follow up posterior hip precautions      · Dispo: Recommend send to [facility] to be monitored   PER CRIT CARE 11/24 @1528: Pt with newly evolving right hip pain, right thigh induration, and oozing blood from surgical site  Pt is likely bleeding into his hip and thigh  Concern for hemorrhage as well as compartment syndrome  Pt will be transferred to [facility]  Pt to receive additional blood products as well as cryoprecipitate    PER NEURO 11/24 @5066  Was called by ICU team, earlier on, the patient had a headache while tPA was being infused, tPA was held CT scan of the brain was repeated there was no evidence of any ICH  Subsequently the patient's blood pressure has dropped and he has been noted to be bleeding at the right hip surgical site and developing compartment syndrome  Patient is being aggressively managed by the ICU team and is being transferred to the [facility] for further orthopedic intervention and management      on examining the patient at this time his left hemipareses appears worse although there was clinical improvement subsequent to his tPA infusion, but since his blood pressure has dropped patient has had worsening signs with slurred speech, left hemiparesis and also developing a field cut  PHYSICAL EXAM/RESULTS/HISTORY OF PRESENT ILLNESS/PAST MEDICAL/SURGICAL/SOCIAL HISTORY/REVIEW OF SYSTEMS/PROGRESS NOTE/DISPOSITION

## 2017-11-27 NOTE — OCCUPATIONAL THERAPY NOTE
Occupational Therapy Progress Note      Patient Name: Keysha Akers    HTWCS'J Date: 11/27/2017    Problem List:   Patient Active Problem List   Diagnosis    Stroke ([facility] )    HTN (hypertension)    DM2 (diabetes mellitus, type 2) ([facility] )    HLD (hyperlipidemia)    CAD (coronary artery disease)    Postoperative anemia    Right hip pain    Acute deep vein thrombosis (DVT) of brachial vein of left upper extremity (HCC)    Coagulopathy ([facility] )           11/27/17 1050   Restrictions/Precautions   Weight Bearing Precautions Per Order Yes   RLE Weight Bearing Per Order WBAT   Other Precautions Fall Risk;Pain;Multiple lines;Telemetry;THR;WBS;Cognitive; Chair Alarm   General   Family/Caregiver Present PT'S FAMILY INCLUDING BROTHER VISITING THIS AM     Lifestyle   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  Intrinsic Gratification PT ENJOYS PLAYING GOLF  PT ENGAGED IN CONVERSATION THIS AM RE: GOLFING AND GOLF CLUBS  Pain Assessment   Pain Assessment 0-10   Pain Score 7   Pain Type Acute pain   Pain Location Hip   Pain Orientation Right   Hospital Pain Intervention(s) Repositioned   Response to Interventions TOLERATED   ADL   Where Assessed Edge of bed   LB Dressing Assistance 2  Maximal Assistance   LB Dressing Deficit Don/doff L sock; Don/doff R sock   LB Dressing Comments MAX A TO DON SOCKS THIS AM  LIMITED ABILITY TO ELEVATE RLE OFF OF BED SURFACE 2* PAIN  Bed Mobility   Supine to Sit 3  Moderate assistance   Additional items Assist x 2; Increased time required;Verbal cues;LE management   Sit to Supine Unable to assess   Additional Comments PT LEFT IN ROOM CHAIR POST EVAL W/ CHAIR ALARM ACTIVATED  Transfers   Sit to Stand 3  Moderate assistance   Additional items Assist x 2; Increased time required;Verbal cues   Stand to Sit 3  Moderate assistance   Additional items Assist x 2; Increased time required;Verbal cues   Functional Mobility   Functional Mobility 3  Moderate assistance Additional Comments ASSIST X2 USING RW -- PT AMBULATED FEW STEPS W/ MOD A X2 USING RW  MAX CUES REQUIRED FOR LE ADVANCEMENT   Additional items Rolling walker   Cognition   Overall Cognitive Status Impaired   Arousal/Participation Responsive   Attention Attends with cues to redirect   Orientation Level Oriented to person;Oriented to place; Disoriented to time;Disoriented to situation   Memory Decreased recall of precautions;Decreased recall of recent events;Decreased short term memory   Following Commands Follows one step commands with increased time or repetition   Comments PT ENGAGES IN APPROPRIATE CONVERSATION W/ THERAPIST HOWEVER DEMONSTRATES POOR CARRYOVER/RECALL OF TOTAL HIP PRECAUTIONS AND IMPAIRED INSIGHT/JUDGEMENT/SAFETY INTO CURRENT FUNCTIONAL DEFICITS  WILL REQUIRE FURTHER FUNCTIONAL/FORMAL ASSESSMENT  Activity Tolerance   Activity Tolerance Patient limited by pain   Medical Staff Made Aware F/U W/ RN RYAN   Assessment   Assessment PLEASE SEE BELOW   Plan   Treatment Interventions ADL retraining;Functional transfer training;UE strengthening/ROM; Endurance training;Cognitive reorientation;Patient/family training;Equipment evaluation/education; Fine motor coordination activities; Neuromuscular reeducation; Compensatory technique education;Continued evaluation; Energy conservation; Activityengagement   Goal Expiration Date 12/06/17   Treatment Day 1   OT Frequency 5x/wk   Recommendation   OT Discharge Recommendation Short Term Rehab   OT - OK to Discharge (TO STR AT THIS TIME)   Barthel Index   Feeding 5   Bathing 0   Grooming Score 0   Dressing Score 0   Bladder Score 10   Bowels Score 10   Toilet Use Score 5   Transfers (Bed/Chair) Score 5   Mobility (Level Surface) Score 0   Stairs Score 0   Barthel Index Score 35   Modified [place] Scale   Modified [place] Scale 4   Neuro QOL Depression Screen - In the Past 7 Days:   I felt depressed 2   I felt hopeless 1   I felt like nothing could cheer me up 2   I felt that my life was empty 1   I felt worthless 1   I felt unhappy 1   I felt I had no reason for living 1   I felt that nothing was interesting 1   Depression Score 10   Neuro QOL Anxiety Screen - In the Past 7 Days:   I felt uneasy 2   I felt nervous 1   Many situations made me worry 5   My worries overwhelmed me 1   I felt tense 2   I had difficulty calming down 1   I had sudden feelings of panic 2   I felt nervous when my normal routine was disturbed 4   Anxiety Score 18     PT SEEN FOR OT TXMENT SESSION FOCUSED ON BED MOBILITY, FUNCTIONAL SITTING BALANCE, ACTIVITY TOLERANCE, REVIEW OF TOTAL HIP PRECAUTIONS, FUNCTIONAL TRANSFERS/AMBULATION, LB DRESSING, COGNITIVE RE-ORIENTATION AS WELL A COMPLETION OF FUNCTIONAL RECOVERY INDECES TO IDENTIFY LONG TERM PROGNOSTIC OUTCOME POTENTIAL AND ANXIETY/DEPRESSION SCREENS  PT REQUIRES MOD A X2 TO COMPLETE BED MOBILITY AND MIN/MOD A TO MAINTAIN SITTING BALANCE AT EOB  PT W/ L LATERAL LEAN 2* R HIP PAIN  PT REQUIRED MAX A TO DON SOCKS THIS AM W/ INCREASED ASSISTANCE TO RAISE RLE OFF BED SURFACE 2* PAIN  PT W/ POOR CARRYOVER OF TOTAL HIP PRECAUTIONS THIS AM - PT RE-EDUCATED AND 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  PT DOES DEMONSTRATE IMPAIRED INSIGHT/JUDGEMENT INTO CURRENT DEFICITS AS PT STATED "I'M HERE FOR A HEART ATTACK" PT ADD'L REQUIRES CUES FOR SAFETY T/O FUNCTIONAL ACTIVITY  PT REQUIRED MOD A X2 TO COMPLETE SIT>STAND TRANSFERS AND SHORT DISTANCE AMBULATION USING RW  PT REQUIRES CUES FOR LE ADVANCEMENT UPON ACHIEVING STANCE  UPON SITTING IN CHAIR, PT ENGAGED IN [place] PROGNOSTIC SCALE TO ASSESS UE MOTOR DEFICIT, PROPRIOCEPTION, BALANCE AND COGNITION  PT SCORED 3 6 INDICATIVE OF MODERATE TO MODERATELY SEVERE IMPAIRMENTS DEMONSTRATING GOOD REHAB POTENTIAL (SCORE <3 2 INDICATE HIGH LIKELIHOOD OF RETURNING HOME, SCORES BETWEEN 3 2-5 2 GENERALLY RESPOND BETTER TO REHAB AND SCORES OF >5 2 ARE TYPICALLY DEPENDENT WITH AN INCREASED RISK FOR INSTITUTIONALIZATION  PT FURTHER ENGAGED IN MOTRICITY INDEX TO ASSESS UE/LE FUNCTION SCORING 88 5/100 DEMONSTRATING MILD TO MODERATE IMPAIRMENTS IN UE/LE STRENGTH  PT ADDITIONALLY ENGAGED IN THE TRUNK CONTROL TEST TO ASSESS POTENTIAL FOR FULL FUNCTIONAL MOBILITY RECOVERY SCORING 0/100 (0 BEING DEPENDENT  BEING INDEPENDENT) DEMONSTRATING SEVERE DEFICITS  PT FINALLY ENGAGED IN NEURO QOL ANXIETY SCREEN SCORING 18/40 INDICATIVE OF MILD ANXIOUS S/S AND NEURO QOL DEPRESSION SCREEN SCORING 10/40 INDICATIVE OF MILD DEPRESSIVE S/S (THRESHOLD FOR TREATMENT 25+ PER NEUROLOGY/SLIM)  PT EDUCATED ON OUTCOME MEASURES AND FUNCTIONAL IMPLICATIONS OF SAME  PT REMAINS LIMITED 2* IMPAIRED BALANCE, ACTIVITY TOLERANCE, MEDICAL STATUS, GENERALIZED WEAKNESS/DECONDITIONING, MINIMAL LUE STRENGTH/COORDINATION DEFICITS, PAIN, WBS, TOTAL HIP PRECAUTIONS, ADL IMPAIRMENTS AS WELL AS IMPAIRED ATTENTION, INSIGHT, JUDGEMENT, ORIENTATION AND PROCESSING  CONTINUE TO RECOMMEND INPATIENT REHAB AT D/C  WILL CONTINUE TO FOLLOW PT ON CASELOAD IN ORDER TO MEET PREVIOUSLY ESTABLISHED GOALS      DOCUMENTATION COMPLETED BY Shama Cornell MS, OTR/L

## 2017-11-28 ENCOUNTER — APPOINTMENT (INPATIENT)
Dept: RADIOLOGY | Facility: HOSPITAL | Age: 82
DRG: 553 | End: 2017-11-28
Payer: MEDICARE

## 2017-11-28 LAB
ANION GAP SERPL CALCULATED.3IONS-SCNC: 6 MMOL/L (ref 4–13)
BASOPHILS # BLD AUTO: 0.04 THOUSANDS/ΜL (ref 0–0.1)
BASOPHILS NFR BLD AUTO: 1 % (ref 0–1)
BUN SERPL-MCNC: 36 MG/DL (ref 5–25)
CALCIUM SERPL-MCNC: 8.1 MG/DL (ref 8.3–10.1)
CHLORIDE SERPL-SCNC: 103 MMOL/L (ref 100–108)
CO2 SERPL-SCNC: 26 MMOL/L (ref 21–32)
CREAT SERPL-MCNC: 1.41 MG/DL (ref 0.6–1.3)
DEPRECATED AT III PPP: 88 % OF NORMAL (ref 92–136)
EOSINOPHIL # BLD AUTO: 0.39 THOUSAND/ΜL (ref 0–0.61)
EOSINOPHIL NFR BLD AUTO: 5 % (ref 0–6)
ERYTHROCYTE [DISTWIDTH] IN BLOOD BY AUTOMATED COUNT: 14.6 % (ref 11.6–15.1)
FERRITIN SERPL-MCNC: 362 NG/ML (ref 8–388)
GFR SERPL CREATININE-BSD FRML MDRD: 47 ML/MIN/1.73SQ M
GLUCOSE SERPL-MCNC: 159 MG/DL (ref 65–140)
GLUCOSE SERPL-MCNC: 210 MG/DL (ref 65–140)
GLUCOSE SERPL-MCNC: 219 MG/DL (ref 65–140)
GLUCOSE SERPL-MCNC: 228 MG/DL (ref 65–140)
GLUCOSE SERPL-MCNC: 269 MG/DL (ref 65–140)
HCT VFR BLD AUTO: 22.5 % (ref 36.5–49.3)
HCT VFR BLD AUTO: 23.9 % (ref 36.5–49.3)
HCYS SERPL-SCNC: 15.1 UMOL/L (ref 5.3–14.2)
HGB BLD-MCNC: 7.6 G/DL (ref 12–17)
HGB BLD-MCNC: 8 G/DL (ref 12–17)
LYMPHOCYTES # BLD AUTO: 1.3 THOUSANDS/ΜL (ref 0.6–4.47)
LYMPHOCYTES NFR BLD AUTO: 17 % (ref 14–44)
MAGNESIUM SERPL-MCNC: 2.2 MG/DL (ref 1.6–2.6)
MCH RBC QN AUTO: 29.7 PG (ref 26.8–34.3)
MCHC RBC AUTO-ENTMCNC: 33.8 G/DL (ref 31.4–37.4)
MCV RBC AUTO: 88 FL (ref 82–98)
MONOCYTES # BLD AUTO: 0.64 THOUSAND/ΜL (ref 0.17–1.22)
MONOCYTES NFR BLD AUTO: 8 % (ref 4–12)
NEUTROPHILS # BLD AUTO: 5.39 THOUSANDS/ΜL (ref 1.85–7.62)
NEUTS SEG NFR BLD AUTO: 69 % (ref 43–75)
NRBC BLD AUTO-RTO: 0 /100 WBCS
PA ADP BLD-ACNC: 377 PRU (ref 194–418)
PA ADP BLD-ACNC: 560 ARU
PHOSPHATE SERPL-MCNC: 3.2 MG/DL (ref 2.3–4.1)
PLATELET # BLD AUTO: 243 THOUSANDS/UL (ref 149–390)
PMV BLD AUTO: 9.2 FL (ref 8.9–12.7)
POTASSIUM SERPL-SCNC: 4.6 MMOL/L (ref 3.5–5.3)
RBC # BLD AUTO: 2.56 MILLION/UL (ref 3.88–5.62)
SODIUM SERPL-SCNC: 135 MMOL/L (ref 136–145)
WBC # BLD AUTO: 7.86 THOUSAND/UL (ref 4.31–10.16)

## 2017-11-28 PROCEDURE — 82728 ASSAY OF FERRITIN: CPT | Performed by: INTERNAL MEDICINE

## 2017-11-28 PROCEDURE — 85303 CLOT INHIBIT PROT C ACTIVITY: CPT | Performed by: INTERNAL MEDICINE

## 2017-11-28 PROCEDURE — 83735 ASSAY OF MAGNESIUM: CPT | Performed by: GENERAL PRACTICE

## 2017-11-28 PROCEDURE — 97535 SELF CARE MNGMENT TRAINING: CPT

## 2017-11-28 PROCEDURE — 85598 HEXAGNAL PHOSPH PLTLT NEUTRL: CPT | Performed by: INTERNAL MEDICINE

## 2017-11-28 PROCEDURE — 83090 ASSAY OF HOMOCYSTEINE: CPT | Performed by: INTERNAL MEDICINE

## 2017-11-28 PROCEDURE — 80048 BASIC METABOLIC PNL TOTAL CA: CPT | Performed by: GENERAL PRACTICE

## 2017-11-28 PROCEDURE — 85576 BLOOD PLATELET AGGREGATION: CPT | Performed by: PHYSICIAN ASSISTANT

## 2017-11-28 PROCEDURE — 86147 CARDIOLIPIN ANTIBODY EA IG: CPT | Performed by: INTERNAL MEDICINE

## 2017-11-28 PROCEDURE — 97530 THERAPEUTIC ACTIVITIES: CPT

## 2017-11-28 PROCEDURE — 85732 THROMBOPLASTIN TIME PARTIAL: CPT | Performed by: INTERNAL MEDICINE

## 2017-11-28 PROCEDURE — 85014 HEMATOCRIT: CPT | Performed by: INTERNAL MEDICINE

## 2017-11-28 PROCEDURE — 84100 ASSAY OF PHOSPHORUS: CPT | Performed by: GENERAL PRACTICE

## 2017-11-28 PROCEDURE — 81241 F5 GENE: CPT | Performed by: INTERNAL MEDICINE

## 2017-11-28 PROCEDURE — 97116 GAIT TRAINING THERAPY: CPT

## 2017-11-28 PROCEDURE — 82948 REAGENT STRIP/BLOOD GLUCOSE: CPT

## 2017-11-28 PROCEDURE — 85305 CLOT INHIBIT PROT S TOTAL: CPT | Performed by: INTERNAL MEDICINE

## 2017-11-28 PROCEDURE — 76770 US EXAM ABDO BACK WALL COMP: CPT

## 2017-11-28 PROCEDURE — 85025 COMPLETE CBC W/AUTO DIFF WBC: CPT | Performed by: INTERNAL MEDICINE

## 2017-11-28 PROCEDURE — 85018 HEMOGLOBIN: CPT | Performed by: INTERNAL MEDICINE

## 2017-11-28 PROCEDURE — 86146 BETA-2 GLYCOPROTEIN ANTIBODY: CPT | Performed by: INTERNAL MEDICINE

## 2017-11-28 PROCEDURE — 85705 THROMBOPLASTIN INHIBITION: CPT | Performed by: INTERNAL MEDICINE

## 2017-11-28 PROCEDURE — 85613 RUSSELL VIPER VENOM DILUTED: CPT | Performed by: INTERNAL MEDICINE

## 2017-11-28 PROCEDURE — 85670 THROMBIN TIME PLASMA: CPT | Performed by: INTERNAL MEDICINE

## 2017-11-28 PROCEDURE — 81240 F2 GENE: CPT | Performed by: INTERNAL MEDICINE

## 2017-11-28 PROCEDURE — 85306 CLOT INHIBIT PROT S FREE: CPT | Performed by: INTERNAL MEDICINE

## 2017-11-28 PROCEDURE — 85300 ANTITHROMBIN III ACTIVITY: CPT | Performed by: INTERNAL MEDICINE

## 2017-11-28 RX ORDER — ASPIRIN 325 MG
325 TABLET ORAL DAILY
Status: DISCONTINUED | OUTPATIENT
Start: 2017-11-29 | End: 2017-11-30

## 2017-11-28 RX ADMIN — ACETAMINOPHEN 975 MG: 325 TABLET, FILM COATED ORAL at 13:44

## 2017-11-28 RX ADMIN — SENNOSIDES 8.6 MG: 8.6 TABLET, FILM COATED ORAL at 21:49

## 2017-11-28 RX ADMIN — ENOXAPARIN SODIUM 105 MG: 120 INJECTION SUBCUTANEOUS at 09:21

## 2017-11-28 RX ADMIN — DOCUSATE SODIUM 100 MG: 100 CAPSULE, LIQUID FILLED ORAL at 17:13

## 2017-11-28 RX ADMIN — OXYCODONE HYDROCHLORIDE 10 MG: 10 TABLET ORAL at 17:14

## 2017-11-28 RX ADMIN — IRON SUCROSE 100 MG: 20 INJECTION, SOLUTION INTRAVENOUS at 09:21

## 2017-11-28 RX ADMIN — DOXAZOSIN 4 MG: 4 TABLET ORAL at 09:21

## 2017-11-28 RX ADMIN — INSULIN LISPRO 2 UNITS: 100 INJECTION, SOLUTION INTRAVENOUS; SUBCUTANEOUS at 21:51

## 2017-11-28 RX ADMIN — METOPROLOL TARTRATE 25 MG: 25 TABLET ORAL at 09:20

## 2017-11-28 RX ADMIN — INSULIN LISPRO 4 UNITS: 100 INJECTION, SOLUTION INTRAVENOUS; SUBCUTANEOUS at 09:20

## 2017-11-28 RX ADMIN — METOPROLOL TARTRATE 25 MG: 25 TABLET ORAL at 21:49

## 2017-11-28 RX ADMIN — DOCUSATE SODIUM 100 MG: 100 CAPSULE, LIQUID FILLED ORAL at 09:21

## 2017-11-28 RX ADMIN — ACETAMINOPHEN 975 MG: 325 TABLET, FILM COATED ORAL at 21:49

## 2017-11-28 RX ADMIN — INSULIN LISPRO 6 UNITS: 100 INJECTION, SOLUTION INTRAVENOUS; SUBCUTANEOUS at 11:38

## 2017-11-28 RX ADMIN — OXYCODONE HYDROCHLORIDE 10 MG: 10 TABLET ORAL at 09:21

## 2017-11-28 RX ADMIN — AMLODIPINE BESYLATE 10 MG: 10 TABLET ORAL at 09:21

## 2017-11-28 RX ADMIN — INSULIN GLARGINE 10 UNITS: 100 INJECTION, SOLUTION SUBCUTANEOUS at 21:49

## 2017-11-28 RX ADMIN — POLYETHYLENE GLYCOL 3350 17 G: 17 POWDER, FOR SOLUTION ORAL at 17:13

## 2017-11-28 RX ADMIN — CLOPIDOGREL BISULFATE 75 MG: 75 TABLET ORAL at 09:20

## 2017-11-28 RX ADMIN — ASPIRIN 81 MG 81 MG: 81 TABLET ORAL at 09:21

## 2017-11-28 RX ADMIN — ENOXAPARIN SODIUM 105 MG: 120 INJECTION SUBCUTANEOUS at 21:50

## 2017-11-28 RX ADMIN — INSULIN LISPRO 4 UNITS: 100 INJECTION, SOLUTION INTRAVENOUS; SUBCUTANEOUS at 17:14

## 2017-11-28 RX ADMIN — FOLIC ACID-PYRIDOXINE-CYANOCOBALAMIN TAB 2.5-25-2 MG 1 TABLET: 2.5-25-2 TAB at 13:44

## 2017-11-28 RX ADMIN — LISINOPRIL 40 MG: 20 TABLET ORAL at 09:20

## 2017-11-28 RX ADMIN — ACETAMINOPHEN 975 MG: 325 TABLET, FILM COATED ORAL at 05:52

## 2017-11-28 NOTE — OCCUPATIONAL THERAPY NOTE
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  Intrinsic Gratification PT ENJOYS PLAYING GOLF  PT ENGAGED IN CONVERSATION THIS AM RE: GOLFING AND GOLF CLUBS  Pain Assessment   Pain Assessment 0-10   Pain Score 9   Pain Type Acute pain   Pain Location Hip   Pain Orientation Right   Pain Descriptors Burning   Pain Frequency Constant/continuous   Patient's Stated Pain Goal No pain   Hospital Pain Intervention(s) Relaxation technique;Repositioned   Diversional Activities Television   Response to Interventions Tolerated  well   Pain Rating: FLACC (Rest) - Face 0   Pain Rating: FLACC (Rest) - Legs 1   Pain Rating: FLACC (Rest) - Activity 0   Pain Rating: FLACC (Rest) - Cry 0   Pain Rating: FLACC (Rest) - Consolability 1   Score: FLACC (Rest) 2   Pain Rating: FLACC (Activity) - Face 1   Pain Rating: FLACC (Activity) - Legs 1   Pain Rating: FLACC (Activity) - Activity 1   Pain Rating: FLACC (Activity) - Cry 1   Pain Rating: FLACC (Activity) - Consolability 1   Score: FLACC (Activity) 5   ADL   Where Assessed Edge of bed   Grooming Comments grooming not performed today   UB Bathing Assistance 5  Supervision/Setup   LB Bathing Assistance 2  Maximal Assistance   LB Bathing Deficit Supervision/safety; Increased time to complete; Buttocks;Right lower leg including foot; Left lower leg including foot   UB Dressing Assistance 4  Minimal Assistance   UB Dressing Comments gown only   Cognition   Overall Cognitive Status Impaired   Arousal/Participation Responsive   Attention Attends with cues to redirect   Orientation Level Oriented X4   Memory Decreased recall of precautions;Decreased recall of recent events;Decreased short term memory   Following Commands Follows one step commands with increased time or repetition   Activity Tolerance   Activity Tolerance Patient limited by pain   Assessment   Assessment Pt supine upon therapist's entry to room  Pt alert and cooperative, agreeable to skilled OT c focus on improving mobility and ADL skills  Pt presents deficits c strength and motor planning  Supine > sit c Mod A  Pt performed ADL at EOB  Pt required A +2 while seated due to  decreased sitting balance 2" to L lateral lean  Pt required set up for UB/LB ADLs c prompts to initiation and  thoroughness  Max A for LB bathing c buttocks and B lower leg/feet  Max A +2 sit > stand c cues for balance at Rw  Pt able to don gown overhead c A for orientation of clothing and cue to thread UEs   During functional txfer bed> recliner, pt required vc's for hand placement on arm rest upon sitting and prompts/cues for initiate movement of R LE   Deficits in cog level require extended time for processing and need for multi-step directions to be repeated  Pt educated in [facility] [address] [facility] throughout session, able to recall 2/3 THP  Pt educated in AE to maintain THP  EC educated provided to A pain management  Recommend con't c skilled OT services to facilitate return to PLOF  Plan   Treatment Interventions ADL retraining;Functional transfer training;UE strengthening/ROM; Endurance training;Cognitive reorientation;Equipment evaluation/education; Fine motor coordination activities;Continued evaluation; Energy conservation; Activityengagement   Goal Expiration Date 12/06/17   Treatment Day 2   OT Frequency 5x/wk   Recommendation   Recommendation PT consult   OT Discharge Recommendation Short Term Rehab   OT - OK to Discharge Yes   Barthel Index   Feeding 5   Bathing 0   Grooming Score 0   Dressing Score 0   Bladder Score 10   Bowels Score 10   Toilet Use Score 5   Transfers (Bed/Chair) Score 5   Mobility (Level Surface) Score 0   Stairs Score 0   Barthel Index Score 35              QUETA Benavides/L

## 2017-11-28 NOTE — PROGRESS NOTES
Oncology Progress Note  Josephtine Jan 80 y o  male MRN: [id number]  Unit/Bed#: [facility] 705-01 Encounter: [id number]      /68   Pulse 67   Temp 98 3 °F (36 8 °C) (Oral)   Resp 16   Ht 5' 10 5" (1 791 m)   Wt 106 kg (233 lb 4 oz)   SpO2 95%   BMI 32 99 kg/m²     Subjective:  DVT left upper arm, CVA with left zulma pheresis, right hip hematoma and anemia secondary to acute blood loss  Patient has received intravenous Venofer today without any reaction/side effects  He has some tiredness  Left hemiparesis has improved significantly  Left arm swelling has decreased  No active bleeding from right hip  surgery at this time  Objective:  Presently no headaches, seizures, diplopia, dysphagia, hoarseness, chest pain, palpitations, shortness of breath, cough, hemoptysis, abdominal pain, melena, hematuria, nausea and vomiting  No fevers, chills,  skin rash, itching, weight loss, night sweats,   No swollen glands, swelling of the ankles, legs cramps and has less anxiety  General Appearance:    Alert, oriented, not in distress       Eyes:    No icterus   Ears:    Normal external ears (bilateral)   Nose:   Nares normal   Throat:   No thrush     Neck:   Supple, no palpable neck masses       Lungs:     Clear to percussion and auscultation bilaterally   Chest Wall:    No deformity    Heart:    Regular rate and rhythm, no murmurs       Abdomen:     Soft, non-tender, bowel sounds +, no organomegaly, no   ascites           Extremities:   Extremities no cyanosis or edema, no calf tenderness  Dressing right hip area       Skin:   No rash   Lymph nodes:   No palpable lymphadenopathy   Neurologic:       Almost no focal neurologic deficit          Performance Status:  2    Recent Results (from the past 48 hour(s))   ECG 12 lead    Collection Time: 11/26/17  3:17 PM   Result Value Ref Range    Ventricular Rate 74 BPM    Atrial Rate 74 BPM    RI Interval 200 ms    QRSD Interval 154 ms    QT Interval 450 ms    QTC Interval 499 ms    P Axis 31 degrees    QRS Axis 42 degrees    T Wave Axis -4 degrees   Troponin I    Collection Time: 11/26/17  3:36 PM   Result Value Ref Range    Troponin I 0 04 <=0 04 ng/mL   Fingerstick Glucose (POCT)    Collection Time: 11/26/17  4:28 PM   Result Value Ref Range    POC Glucose 363 (H) 65 - 140 mg/dl   Troponin I    Collection Time: 11/26/17  6:26 PM   Result Value Ref Range    Troponin I 0 03 <=0 04 ng/mL   Fingerstick Glucose (POCT)    Collection Time: 11/26/17  9:17 PM   Result Value Ref Range    POC Glucose 230 (H) 65 - 140 mg/dl   Troponin I    Collection Time: 11/26/17  9:20 PM   Result Value Ref Range    Troponin I 0 04 <=0 04 ng/mL   CBC and Platelet    Collection Time: 11/27/17  6:45 AM   Result Value Ref Range    WBC 8 32 4 31 - 10 16 Thousand/uL    RBC 2 78 (L) 3 88 - 5 62 Million/uL    Hemoglobin 8 2 (L) 12 0 - 17 0 g/dL    Hematocrit 24 0 (L) 36 5 - 49 3 %    MCV 86 82 - 98 fL    MCH 29 5 26 8 - 34 3 pg    MCHC 34 2 31 4 - 37 4 g/dL    RDW 14 5 11 6 - 15 1 %    Platelets 332 738 - 195 Thousands/uL    MPV 9 4 8 9 - 12 7 fL   Basic metabolic panel    Collection Time: 11/27/17  6:45 AM   Result Value Ref Range    Sodium 136 136 - 145 mmol/L    Potassium 4 4 3 5 - 5 3 mmol/L    Chloride 103 100 - 108 mmol/L    CO2 27 21 - 32 mmol/L    Anion Gap 6 4 - 13 mmol/L    BUN 38 (H) 5 - 25 mg/dL    Creatinine 1 34 (H) 0 60 - 1 30 mg/dL    Glucose 145 (H) 65 - 140 mg/dL    Calcium 8 3 8 3 - 10 1 mg/dL    eGFR 50 ml/min/1 73sq m   Fingerstick Glucose (POCT)    Collection Time: 11/27/17  6:47 AM   Result Value Ref Range    POC Glucose 156 (H) 65 - 140 mg/dl   Fingerstick Glucose (POCT)    Collection Time: 11/27/17 11:21 AM   Result Value Ref Range    POC Glucose 207 (H) 65 - 140 mg/dl   Fingerstick Glucose (POCT)    Collection Time: 11/27/17  4:17 PM   Result Value Ref Range    POC Glucose 207 (H) 65 - 140 mg/dl   Fingerstick Glucose (POCT)    Collection Time: 11/27/17  9:05 PM   Result Value Ref Range POC Glucose 215 (H) 65 - 140 mg/dl   Basic metabolic panel    Collection Time: 11/28/17  5:40 AM   Result Value Ref Range    Sodium 135 (L) 136 - 145 mmol/L    Potassium 4 6 3 5 - 5 3 mmol/L    Chloride 103 100 - 108 mmol/L    CO2 26 21 - 32 mmol/L    Anion Gap 6 4 - 13 mmol/L    BUN 36 (H) 5 - 25 mg/dL    Creatinine 1 41 (H) 0 60 - 1 30 mg/dL    Glucose 159 (H) 65 - 140 mg/dL    Calcium 8 1 (L) 8 3 - 10 1 mg/dL    eGFR 47 ml/min/1 73sq m   Magnesium    Collection Time: 11/28/17  5:40 AM   Result Value Ref Range    Magnesium 2 2 1 6 - 2 6 mg/dL   Phosphorus    Collection Time: 11/28/17  5:40 AM   Result Value Ref Range    Phosphorus 3 2 2 3 - 4 1 mg/dL   CBC and differential    Collection Time: 11/28/17  5:40 AM   Result Value Ref Range    WBC 7 86 4 31 - 10 16 Thousand/uL    RBC 2 56 (L) 3 88 - 5 62 Million/uL    Hemoglobin 7 6 (L) 12 0 - 17 0 g/dL    Hematocrit 22 5 (L) 36 5 - 49 3 %    MCV 88 82 - 98 fL    MCH 29 7 26 8 - 34 3 pg    MCHC 33 8 31 4 - 37 4 g/dL    RDW 14 6 11 6 - 15 1 %    MPV 9 2 8 9 - 12 7 fL    Platelets 320 841 - 825 Thousands/uL    nRBC 0 /100 WBCs    Neutrophils Relative 69 43 - 75 %    Lymphocytes Relative 17 14 - 44 %    Monocytes Relative 8 4 - 12 %    Eosinophils Relative 5 0 - 6 %    Basophils Relative 1 0 - 1 %    Neutrophils Absolute 5 39 1 85 - 7 62 Thousands/µL    Lymphocytes Absolute 1 30 0 60 - 4 47 Thousands/µL    Monocytes Absolute 0 64 0 17 - 1 22 Thousand/µL    Eosinophils Absolute 0 39 0 00 - 0 61 Thousand/µL    Basophils Absolute 0 04 0 00 - 0 10 Thousands/µL   Homocysteine, serum    Collection Time: 11/28/17  5:40 AM   Result Value Ref Range    Homocyst(e)ine, P/S 15 1 (H) 5 3 - 14 2 umol/L   Ferritin    Collection Time: 11/28/17  5:40 AM   Result Value Ref Range    Ferritin 362 8 - 388 ng/mL   Antithrombin III Activity    Collection Time: 11/28/17  5:41 AM   Result Value Ref Range    AntiThrombIN III Activity 88 (L) 92 - 136 % of Normal   Fingerstick Glucose (POCT) Collection Time: 11/28/17  8:04 AM   Result Value Ref Range    POC Glucose 228 (H) 65 - 140 mg/dl   Fingerstick Glucose (POCT)    Collection Time: 11/28/17 11:07 AM   Result Value Ref Range    POC Glucose 269 (H) 65 - 140 mg/dl         Ct Chest Abdomen Pelvis Wo Contrast    Result Date: 11/24/2017  Narrative: CT CHEST, ABDOMEN AND PELVIS WITHOUT IV CONTRAST INDICATION:  Injury to trunk  Recent surgery to right hip  Stroke alert  Weakness  COMPARISON: None  TECHNIQUE: CT examination of the chest, abdomen and pelvis was performed without intravenous contrast   Reformatted images were created in axial, sagittal, and coronal planes  Radiation dose length product (DLP) for this visit:  1323 mGy-cm   This examination, like all CT scans performed in the [facility], was performed utilizing techniques to minimize radiation dose exposure, including the use of iterative reconstruction and automated exposure control  Enteric contrast was not administered  FINDINGS: CHEST LUNGS:  Mild scattered atelectasis bilaterally  Airway is patent  No focal infiltrate  No pneumothorax  PLEURA:  Unremarkable  HEART/GREAT VESSELS:  The heart is mildly enlarged  Extensive coronary artery calcifications are noted  Thoracic aorta is normal caliber with mild/moderate wall calcification  MEDIASTINUM AND JAYDA:  Unremarkable  CHEST WALL AND LOWER NECK:   Focal skin thickening noted anterior to the sternum just right of midline measuring up to 1 6 cm  Correlate clinically for skin lesion  Rounded cyst more inferiorly anterior to the sternum measuring up to 2 2 cm possibly a sebaceous cyst     ABDOMEN Somewhat suboptimal evaluation of the upper abdomen due to streak artifact from the patient's arms  LIVER/BILIARY TREE:  Unremarkable  GALLBLADDER:  No calcified gallstones  No pericholecystic inflammatory change   SPLEEN:  Linear calcification at the inferior aspect of the spleen within a cleft, question related to old injury  PANCREAS:  Unremarkable  ADRENAL GLANDS:  Unremarkable  KIDNEYS/URETERS:  No hydronephrosis  Excreted contrast material within the collecting systems bilaterally  2 7 cm hypodense lesion in the left kidney middle pole posteriorly does not measure simple fluid attenuation which could be secondary to streak artifact  STOMACH AND BOWEL:  Limited evaluation without contrast    Grossly unremarkable  APPENDIX:  No findings to suggest appendicitis  ABDOMINOPELVIC CAVITY:  No ascites or free intraperitoneal air  No lymphadenopathy  VESSELS:  Dense wall calcification along the aorta and arteries  PELVIS Limited evaluation of the pelvis due to streak artifact from bilateral hip replacement hardware  REPRODUCTIVE ORGANS:  Prostate is not well visualized due to streak artifact from bilateral hip replacement hardware  URINARY BLADDER:  Excreted contrast within the lumen  Tiny amount of intraluminal air, could be related to recent catheterization, infection is not excluded  ABDOMINAL WALL/INGUINAL REGIONS:  Soft tissue swelling and patchy gas adjacent to the right hip compatible with recent surgery as per history  Collection in the right hip lateral subcutaneous tissues measures 4 6 x 2 8 cm, with scattered gas droplets, slightly hyperdense  OSSEOUS STRUCTURES:  Bilateral hip replacement hardware identified  The bones are demineralized  Degenerative spurring is noted along the spine  Old healed rib fractures on the left  No acute fracture  Impression: 1  Soft tissue swelling and patchy gas adjacent to the right hip compatible with recent surgery as per history  Collection in the right hip lateral  subcutaneous tissues measures 4 6 x 2 8 cm, slightly hyperdense, likely a small postoperative collection/hematoma with small droplets of air  This could be followed with ultrasound if there is clinical concern for interval enlargement   2   2 7 cm hypodense lesion in the left kidney middle pole posteriorly does not measure simple fluid attenuation  This could be a result of streak artifact  Recommend follow-up with renal ultrasound nonemergently  3   Tiny amount of air in the bladder lumen, nonspecific could be related to recent catheterization, underlying infection is not excluded  Correlate clinically  4   Focal skin thickening noted anterior to the sternum just right of midline measuring up to 1 6 cm  Correlate clinically for skin lesion  Rounded cyst more inferiorly anterior to the sternum measuring up to 2 2 cm possibly a sebaceous cyst  Workstation performed: [id number]     Xr Hip/pelv 2-3 Vws Right If Performed    Result Date: 11/25/2017  Narrative: RIGHT HIP INDICATION:  RIGHT revision  COMPARISON: Recent CT scanogram  VIEWS: AP pelvis and 2 coned down views of the hip IMAGES:  4 FINDINGS: RIGHT hip replacement hardware appears seated and intact  No evidence for loosening  Cerclage wires noted about the proximal femoral hardware  Greater trochanter fragment displaced laterally, stable compared to prior  Overlying postoperative changes seen in the upper lateral 5 consisting of soft tissue emphysema and skin staples  Prior LEFT hip replacement also noted  Contrast excretion seen within the bladder  Multilevel degenerative changes within the lumbar spine  Diffuse osteopenia  Impression: Bilateral hip replacement as above  No acute abnormality evident  Workstation performed: [id number]     Ct Head Wo Contrast    Result Date: 11/25/2017  Narrative: CT BRAIN - WITHOUT CONTRAST INDICATION:  Stroke COMPARISON:  CT 11/24/2017 TECHNIQUE:  CT examination of the brain was performed  In addition to axial images, coronal reformatted images were created and submitted for interpretation  Radiation dose length product (DLP) for this visit:  1002 77 mGy-cm     This examination, like all CT scans performed in the [facility], was performed utilizing techniques to minimize radiation dose exposure, including the use of iterative reconstruction and automated exposure control  IMAGE QUALITY:  Diagnostic  FINDINGS:  PARENCHYMA:  No intracranial hemorrhage, mass, mass effect or edema  Diffuse parenchymal changes consistent with chronic small vessel disease are reidentified  These are present with advanced degree  No definite change in the CT appearance of the parenchyma since prior study  Scattered vascular calcifications  There is no parenchymal hemorrhage  VENTRICLES AND EXTRA-AXIAL SPACES:  Normal for patient's age  VISUALIZED ORBITS AND PARANASAL SINUSES:  Unremarkable  CALVARIUM AND EXTRACRANIAL SOFT TISSUES:   Normal      Impression: No acute intracranial abnormality  Stable CT appearance of the brain  Age-appropriate volume loss, advanced chronic small vessel disease  Workstation performed: [id number]     Ct Head Wo Contrast    Result Date: 11/24/2017  Narrative: CT BRAIN - WITHOUT CONTRAST INDICATION:  Headache after TPA  COMPARISON:  Head CT 11/24/2017 1014 hours TECHNIQUE:  CT examination of the brain was performed  In addition to axial images, coronal reformatted images were created and submitted for interpretation  Radiation dose length product (DLP) for this visit:  969 mGy-cm   This examination, like all CT scans performed in the [place], was performed utilizing techniques to minimize radiation dose exposure, including the use of iterative reconstruction and automated exposure control  IMAGE QUALITY:  Diagnostic  FINDINGS: Head CT exam performed on 11/24/2017 1320 hours  PARENCHYMA:  Patchy decreased attenuation is noted in the supratentorial white matter demonstrating an appearance most consistent with moderate microangiopathic change  No intracranial mass, mass effect or midline shift  No CT signs of acute infarction  There is no parenchymal hemorrhage  VENTRICLES AND EXTRA-AXIAL SPACES:  Age-appropriate volume loss is noted  No hydrocephalus   VISUALIZED ORBITS AND PARANASAL SINUSES:  Unremarkable  CALVARIUM AND EXTRACRANIAL SOFT TISSUES:   Normal      Impression: No acute intracranial abnormality  Stable head CT from earlier the same day  Workstation performed: [id number]     Mri Brain Wo Contrast    Result Date: 11/25/2017  Narrative: MRI BRAIN WITHOUT CONTRAST INDICATION:  CVA, recent right THR COMPARISON:   CT performed earlier same day TECHNIQUE:  Sagittal T1, axial T2, axial FLAIR, axial T1, axial [place] and axial diffusion imaging  IMAGE QUALITY:  Diagnostic  FINDINGS: BRAIN PARENCHYMA:  Diffuse generalized volume loss, enlargement of all CSF-containing spaces  No acute ischemia, intracranial mass, mass effect or edema  Moderate degree of chronic small vessel disease  Tiny focus of hemosiderin deep in the left frontal white matter also, right upper midbrain  These could represent sequela of small cavernous angiomas or microvascular hemorrhages  VENTRICLES:  The ventricles are normal in size and contour  SELLA AND PITUITARY GLAND:  Normal  ORBITS:  Normal  PARANASAL SINUSES:  Normal  VASCULATURE:  Evaluation of the major intracranial vasculature demonstrates appropriate flow voids  CALVARIUM AND SKULL BASE:  Normal  EXTRACRANIAL SOFT TISSUES:  Normal      Impression: No acute disease  Diffuse generalized volume loss, moderate degree of chronic small vessel disease  Workstation performed: [id number]     Xr Stroke Alert Portable Chest    Result Date: 11/24/2017  Narrative: CHEST INDICATION:  Stroke alert COMPARISON:  1/9/2014 VIEWS:   AP frontal IMAGES:  1 FINDINGS:     Cardiomediastinal silhouette appears unremarkable  The lungs are clear  No pneumothorax or pleural effusion  Visualized osseous structures appear within normal limits for the patient's age  Impression: No active pulmonary disease   Workstation performed: [id number]     Ct Stroke Alert Brain    Result Date: 11/24/2017  Narrative: CT BRAIN - STROKE ALERT PROTOCOL INDICATION:  Stroke alert COMPARISON:  None  TECHNIQUE:  CT examination of the brain was performed  In addition to axial images, coronal reformatted images were created and submitted for interpretation  Radiation dose length product (DLP) for this visit:  949 mGy-cm   This examination, like all CT scans performed in the [facility], was performed utilizing techniques to minimize radiation dose exposure, including the use of iterative reconstruction and automated exposure control  IMAGE QUALITY:  Diagnostic  FINDINGS:  PARENCHYMA:  No intracranial mass, mass effect or midline shift  No acute intracranial hemorrhage  No CT signs of acute infarction  Moderate periventricular and white matter hypodensities are seen likely due to chronic small vessel ischemic changes There are no CT signs of acute territorial infarct  Atherosclerotic constipation seen in the carotids VENTRICLES AND EXTRA-AXIAL SPACES:  Normal for patient's age  VISUALIZED ORBITS AND PARANASAL SINUSES:  Unremarkable  CALVARIUM AND EXTRACRANIAL SOFT TISSUES:   Normal      Impression: No acute intracranial hemorrhage seen Moderate periventricular and white matter hypodensity seen likely due to chronic small vessel ischemic changes Findings were directly discussed with Lamin Garza on 11/24/2017 10:20 AM  Workstation performed: [id number]     Vas Upper Limb Venous Duplex Scan, Unilateral/limited    Result Date: 11/25/2017  Narrative:  [facility] REPORT CLINICAL: Indications:  Swelling [M79 [id number]]  Swelling of left forearm  FINDINGS:  Left        Impression              Subclavian  Normal (Patent)         Brachial    Occlusive Subsegmental     CONCLUSION:  Impression RIGHT UPPER LIMB LIMITED: Evaluation shows no evidence of thrombus in the internal jugular vein, subclavian vein, and the brachiocephalic vein  LEFT UPPER LIMB: Evidence of acute occlusive deep vein thrombosis in a single branch of the brachial veins   Thrombus extends from upper arm to approximately mid arm  No evidence of superficial thrombophlebitis noted  Doppler evaluation shows a normal response to augmentation maneuvers  Results given to ICU nurse and doctor at the desk at 1500  SIGNATURE: Electronically Signed by: Johnny Jenkins on 2017-11-25 09:28:16 AM    Us Extremity Soft Tissue    Result Date: 11/24/2017  Narrative: ULTRASOUND RIGHT HIP/ANTERIOR THIGH TECHNIQUE: Real-time grayscale and color Doppler examination of the right upper thigh was performed with a 4 MHz curvilinear transducer, beginning anteriorly proceeding laterally  INDICATION:  Abnormal CT scan  COMPARISON:  CT performed November 24, 2017 at 1323 hours  FINDINGS: Ultrasound images demonstrated only diffuse edema also obtained as fat  No obvious focal fluid collections could be confirmed on this exam         Impression: No obvious focal fluid collections could be confirmed on this exam  Recommend interval follow-up CT if there is clinical concern for enlarging hematoma Or abscess Workstation performed: [id number]     Cta Stroke Alert (head/neck)    Result Date: 11/24/2017  Narrative: CTA NECK AND BRAIN WITH AND WITHOUT CONTRAST INDICATION: Left-sided weakness COMPARISON:   None  TECHNIQUE:  Routine CT imaging of the Brain without contrast   Post contrast imaging was performed after administration of iodinated contrast through the neck and brain  Post contrast axial 0 625 mm images timed to opacify the arterial system  3D rendering was performed on an independent workstation  MIP reconstructions performed  Coronal reconstructions were performed of the noncontrast portion of the brain  Radiation dose length product (DLP) for this visit:  385 mGy-cm   This examination, like all CT scans performed in the [facility], was performed utilizing techniques to minimize radiation dose exposure, including the use of iterative reconstruction and automated exposure control     IV Contrast:  85 mL of iohexol (OMNIPAQUE)     IMAGE QUALITY:   Diagnostic FINDINGS: NONCONTRAST BRAIN PARENCHYMA:  No intracranial mass, mass effect or midline shift  No acute intracranial hemorrhage  No CT signs of acute infarction  VENTRICLES AND EXTRA-AXIAL SPACES:  Normal for patient's age  VISUALIZED ORBITS AND PARANASAL SINUSES:  Unremarkable  CALVARIUM AND EXTRACRANIAL SOFT TISSUES:   Normal  CERVICAL VASCULATURE AORTIC ARCH AND GREAT VESSELS:  There are scattered areas of foot calcific plaquing noted in the arch  The brachiocephalic artery is patent There is mild calcific plaquing seen at the region of the left subclavian artery without significant stenosis There is moderate to calcific plaquing seen at the region of the right subclavian artery  RIGHT VERTEBRAL ARTERY CERVICAL SEGMENT: The right vertebral artery demonstrates marked decrease in its caliber  A small focal area of  calcific plaquing is noted  There is moderate-sized segment of the cervical portion of the right vertebral artery which does not demonstrate to contrast enhancement extending from C3 to the C1 level  The V3 segment of the right vertebral artery isn't identified  The intracranial portion of the right vertebral artery is also decreased caliber LEFT VERTEBRAL ARTERY CERVICAL SEGMENT:  Normal origin  There is small focal area of small focal area of calcific plaquing in the left vertebral artery in the V3 segment with mild less than 50% narrowing  RIGHT EXTRACRANIAL CAROTID SEGMENT:  Normal caliber common carotid artery  Normal bifurcation and cervical internal carotid artery  There is no significant stenosis seen in the right internal carotid artery  There is medialization of the right common  carotid artery which lies in the posterior pharyngeal wall   There is moderate calcific plaquing seen with narrowing in the range of 50% in the right external carotid artery There is no significant narrowing seen within the right internal carotid artery LEFT EXTRACRANIAL CAROTID SEGMENT:  Normal caliber common carotid artery  Normal bifurcation and cervical internal carotid artery  There is mild distal left and 50% narrowing in the right common carotid artery    NASCET criteria was used to determine the degree of internal carotid artery diameter stenosis  INTRACRANIAL VASCULATURE INTERNAL CAROTID ARTERIES:  Right internal carotid artery demonstrates a episodic calcification in the cavernous portion without significant narrowing  The left internal carotid artery demonstrates moderate calcific plaquing in the cavernous portion with narrowing in the range of  50% ANTERIOR CIRCULATION: The right A1 segment is smaller  The left A1 segment is mildly larger   MIDDLE CEREBRAL ARTERY CIRCULATION:  The right M1 segment is patent  There is a mild narrowing at the region of the superior division of the right MCA There is focal area of foot high-grade narrowing with the stenosis in the range of 90% at the region of the inferior division of the right MCA  There is moderate narrowing in the range of 50% at the region of the left M1 segment Mild disease is also noted within the inferior division of the left MCA DISTAL VERTEBRAL ARTERIES:  There is small segment of foot moderate to calcific plaquing within the left vertebral artery in its intracranial portion    The before segment of the right vertebral artery is also small caliber There is atherosclerotic disease noted within the V3 segment of the right vertebral artery BASILAR ARTERY:  There is moderate to severe disease seen in the proximal to mid segment of the basilar artery POSTERIOR CEREBRAL ARTERIES: There is moderate disease noted within the both posterior cerebral arteries with multifocal areas mild narrowing DURAL VENOUS SINUSES:  Normal  NON VASCULAR ANATOMY BONY STRUCTURES:  There are degenerative changes noted within the cervical spine with reversal of the cervical lordosis with the central canal narrowing at the C3-4 level and the C4-5 level and C5-6 level SOFT TISSUES OF THE NECK:  Normal         THORACIC INLET:  Unremarkable  Impression: There is no large vessel occlusion noted within the intracranial circulation There is a small focal area of foot severe stenosis at the region of the inferior division of the right MCA with narrowing in the range of  90% There is moderate to severe atherosclerotic disease with the narrowing in the proximal to midportion of the basilar artery There is lack of enhancement noted within the small segment of the distal cervical portion of the right vertebral artery with the markedly decreased caliber of the visualized portion of the cervical vertebral artery  This may be due to total or subtotal  occlusion of the right vertebral artery  No significant carotid stenosis Central canal stenosis of the cervical spine at C3-4 and C4-5 level  I personally discussed this study with Dilma Landon on 11/24/2017 10:33 AM   I personally discussed this study with Dr Kim Littlejohn on 11/24/2017 11:10 AM   Workstation performed: [id number]         Assessment :  Left upper arm DVT, right hip hematoma, CVA with left hemiparesis, anemia secondary to acute blood loss  Patient's condition is  improving  There is some drop in hematocrit and that is being monitored and if drop continued he will be rechecked for bleeding and have transfusion  Continue with intravenous Venofer  High homocystine level  Thrombosis panel results are pending  For cyst in the kidney he will have ultrasound of kidneys  Plan:  Anticoagulation  Blood transfusions as needed clinically  To monitor platelet counts on Lovenox  Intravenous Venofer  Ultrasound of kidneys  [facility]

## 2017-11-28 NOTE — PHYSICAL THERAPY NOTE
Physical Therapy Progress Note   11/28/17 1521   Pain Assessment   Pain Assessment 0-10   Pain Score 5   Pain Type Acute pain   Pain Location Hip   Pain Orientation Right   Pain Descriptors Aching;Dull   Pain Frequency Constant/continuous   Pain Onset Ongoing   Clinical Progression Not changed   Effect of Pain on Daily Activities SEVERELY LIMITED MOBILITY  Patient's Stated Pain Goal No pain   Hospital Pain Intervention(s) Repositioned; Ambulation/increased activity   Response to Interventions AGREEABLE TO TRANSITION TO STRETCHER FOR TESTING  Multiple Pain Sites No   Pain Rating: FLACC (Rest) - Face 1   Pain Rating: FLACC (Rest) - Legs 1   Pain Rating: FLACC (Rest) - Activity 0   Pain Rating: FLACC (Rest) - Cry 1   Pain Rating: FLACC (Rest) - Consolability 1   Score: FLACC (Rest) 4   Pain Rating: FLACC (Activity) - Face 1   Pain Rating: FLACC (Activity) - Legs 1   Pain Rating: FLACC (Activity) - Activity 0   Pain Rating: FLACC (Activity) - Cry 1   Pain Rating: FLACC (Activity) - Consolability 1   Score: FLACC (Activity) 4   Precautions   Total Hip Replacement ADduction; Internal rotation; Flexion   Restrictions/Precautions   Weight Bearing Precautions Per Order Yes   RLE Weight Bearing Per Order WBAT   Other Precautions Chair Alarm; Fall Risk;Pain;THR;WBS;Cognitive   General   Chart Reviewed Yes   Response to Previous Treatment Patient with no complaints from previous session  Family/Caregiver Present No   Cognition   Overall Cognitive Status Impaired   Arousal/Participation Alert   Attention Attends with cues to redirect   Orientation Level Oriented X4   Memory Decreased recall of precautions  (EXTENDED TIME FOR REINFORCEMENT OF RLE HIP PRECAUTIONS )   Following Commands Follows one step commands with increased time or repetition   Subjective   Subjective THE PT  REPORTS BEING UNABLE TO BEAR WEIGHT THROUGH RLE  Transfers   Sit to Stand 3  Moderate assistance   Additional items Assist x 2;Armrests; Increased time required;Verbal cues   Stand to Sit 3  Moderate assistance   Additional items Assist x 2; Increased time required;Verbal cues  (TO STRTECHER FOR TESTING)   Ambulation/Elevation   Gait pattern Improper Weight shift; Forward Flexion; Antalgic;Decreased R stance; Inconsistent ricardo; Excessively slow   Gait Assistance 3  Moderate assist   Additional items Assist x 2;Verbal cues; Tactile cues  (POSTURE,WEIGHT SHIFTING)   Assistive Device Rolling walker   Distance 5'X1   (TO STRETCHER FOR TESTING)   Stair Management Assistance Not tested   Balance   Static Sitting Fair  (RECLINER WITH ALARM ACTIVATED )   Static Standing Poor   Ambulatory Poor -   Endurance Deficit   Endurance Deficit Yes   Endurance Deficit Description EXPECTED DECONDITIONING  Activity Tolerance   Activity Tolerance Patient limited by fatigue;Patient limited by pain;Treatment limited secondary to medical complications (Comment)   Nurse Made Aware SPOKE WITH AN   Assessment   Prognosis Good   Problem List Decreased strength;Decreased endurance; Impaired balance;Decreased mobility; Decreased coordination;Decreased cognition; Impaired judgement;Decreased safety awareness;Pain;Orthopedic restrictions   Assessment PT  PRESENTS WITH INCREASED DISCOMFORT AND STIFFNESS FOLLOWING OUT OF BED AND NOW ATTEMPTING TO TRANSITION TO STRETCHER FOR TESTING  POOR CARRYOVER TO INSTRUCTION RE: HIP PRECAUTIONS  GIVEN THE PT'S DECONDITIONED STATE AND ONGOING RISK FOR FALLS,CONTINUED REHAB UPON D/C REMAINS APPROPIATE  Goals   Patient Goals TO NOT FALL   STG Expiration Date 12/10/17   Treatment Day 2   Plan   Treatment/Interventions Functional transfer training; Endurance training;Bed mobility;Gait training; Compensatory technique education   Progress Progressing toward goals   PT Frequency 5x/wk; Weekend  (1X/WEEKEND)   Recommendation   Recommendation (REHAB)   Equipment Recommended Harlan Peguero   PT - OK to Discharge Yes  ([address] )     Jossie Thompson PTA

## 2017-11-28 NOTE — PLAN OF CARE
Problem: PHYSICAL THERAPY ADULT  Goal: Performs mobility at highest level of function for planned discharge setting  See evaluation for individualized goals  Treatment/Interventions: Functional transfer training, LE strengthening/ROM, Therapeutic exercise, Endurance training, Patient/family training, Bed mobility, Gait training, Equipment eval/education  Equipment Recommended: Haroon Camera       See flowsheet documentation for full assessment, interventions and recommendations  Outcome: Progressing  Prognosis: Good  Problem List: Decreased strength, Decreased endurance, Impaired balance, Decreased mobility, Decreased coordination, Decreased cognition, Impaired judgement, Decreased safety awareness, Pain, Orthopedic restrictions  Assessment: PT  PRESENTS WITH INCREASED DISCOMFORT AND STIFFNESS FOLLOWING OUT OF BED AND NOW ATTEMPTING TO TRANSITION TO STRETCHER FOR TESTING  POOR CARRYOVER TO INSTRUCTION RE: HIP PRECAUTIONS  GIVEN THE PT'S DECONDITIONED STATE AND ONGOING RISK FOR FALLS,CONTINUED REHAB UPON D/C REMAINS APPROPIATE  Recommendation:  (REHAB)     PT - OK to Discharge: (S) Yes ([address] [place] [address] )    See flowsheet documentation for full assessment     Romina Osorio, BRIAN

## 2017-11-28 NOTE — CASE MANAGEMENT
Continued Stay Review    Date: 11/28/2017    Vital Signs: /68   Pulse 67   Temp 98 3 °F (36 8 °C) (Oral)   Resp 16   Ht 5' 10 5" (1 791 m)   Wt 106 kg (233 lb 4 oz)   SpO2 95%   BMI 32 99 kg/m²     Medications:   Scheduled Meds:   acetaminophen 975 mg Oral Q8H [facility]   amLODIPine 10 mg Oral Daily   aspirin 81 mg Oral Daily   clopidogrel 75 mg Oral Daily   docusate sodium 100 mg Oral BID   doxazosin 4 mg Oral Daily   enoxaparin 1 mg/kg Subcutaneous C94F [facility]   folic acid-pyridoxine-cyanocobalamin 1 tablet Oral Daily   insulin glargine 10 Units Subcutaneous HS   insulin lispro 1-6 Units Subcutaneous HS   insulin lispro 2-12 Units Subcutaneous TID AC   [START ON 11/29/2017] iron sucrose 100 mg Intravenous Daily   lisinopril 40 mg Oral Daily   metoprolol tartrate 25 mg Oral Q12H SACHIN   senna 1 tablet Oral HS     Continuous Infusions:    PRN Meds: influenza vaccine    iohexol    metoprolol    ondansetron    oxyCODONE    oxyCODONE    polyethylene glycol    Abnormal Labs/Diagnostic Results: LABS - bs 228 - 269, h&h 8 0/23 9    Age/Sex: 80 y o  male     Assessment/Plan: Subjective:  DVT left upper arm, CVA with left zulma pheresis, right hip hematoma and anemia secondary to acute blood loss  Patient has received intravenous Venofer today without any reaction/side effects  He has some tiredness  Left hemiparesis has improved significantly  Left arm swelling has decreased    No active bleeding from right hip  surgery at this time      Objective:  Presently no headaches, seizures, diplopia, dysphagia, hoarseness, chest pain, palpitations, shortness of breath, cough, hemoptysis, abdominal pain, melena, hematuria, nausea and vomiting  No fevers, chills,  skin rash, itching, weight loss, night sweats,   No swollen glands, swelling of the ankles, legs cramps and has less anxiety        [facility] [place]

## 2017-11-28 NOTE — PROGRESS NOTES
[address] Internal Medicine Progress Note  Patient: Vicki Fernandes 80 y o  male   MRN: [id number]  PCP: Bushra Wilhelm DO  Unit/Bed#: [facility] 705-01 Encounter: [id number]  Date Of Visit: 11/28/17    Assessment and Plan:    TIA s/p TPA on 11/24:   appreciate neuro recs  Will DC Plavix and Increase aspirin to full dose 325 milligrams as patient   on anticoagulation  Patient will need anticoagulation for acute DVT of the upper extremity for at least 6 months  Once off anticoagulation can be started back on dual antiplatelet  on statin 2/2 allergy      ABLA 2/2 right hip hematoma:   Hemoglobin dropped from 8 2 yesterday to 7 6 this morning  Patient denies any hip pain or pain anywhere else  Also denies any evident today  Pt received 4U PRBC  Ortho evaluate patient  Will monitor H/H closely      Acute LUE DVT:   pt on therapeutic Lovenox  Continue   Patient evaluated by Hematology  Recommend Lovenox for now  Might benefit from switching to NOAC on discharge possibly tomorrow if hemoglobin stable      CAD s/p PCI x 2, last stent 2/2016:   EKG shows ST elevations, but these appear to be present in prior EKGs  Trops neg x 3  Plavix discontinued and aspirin increased to full dose      S/p R hip revision MAREN 11/20: per ortho, WBAT  This appears stable  Pain control with oxycodone prn and tylenol rtc  CKD3: Cr under baseline 1 4-1 5     HTN: Restarted pt's home meds including ACEi and Cardura  C/w BB and Norvasc              VTE Pharmacologic Prophylaxis:   Pharmacologic: Enoxaparin (Lovenox)  Mechanical VTE Prophylaxis in Place: Yes     Patient Centered Rounds: I have performed bedside rounds with nursing staff today      Discussions with Specialists or Other Care Team Provider: neuro and heme     Education and Discussions with Family / Patient: pt - declined call to family     Time Spent for Care: 45 minutes    More than 50% of total time spent on counseling and coordination of care as described above      Current Length of Stay: 4 day(s)     Current Patient Status: Inpatient   Certification Statement: The patient will continue to require additional inpatient hospital stay due to need to monitor for bleeding     Discharge Plan / Estimated Discharge Date:  possibly tomorrow if hemoglobin stable     Code Status: Level 1 - Full Code          Subjective:   Pt seen and examined by me this morning  Pt denied any complaints  Objective:     Vitals:   Temp (24hrs), Av 1 °F (36 7 °C), Min:97 8 °F (36 6 °C), Max:98 4 °F (36 9 °C)    HR:  [60-76] 68  Resp:  [18] 18  BP: (128-176)/(59-80) 171/80  SpO2:  [96 %-98 %] 98 %  Body mass index is 32 99 kg/m²  Input and Output Summary (last 24 hours): Intake/Output Summary (Last 24 hours) at 17 1151  Last data filed at 17 1026   Gross per 24 hour   Intake              720 ml   Output             1280 ml   Net             -560 ml       Physical Exam:     Physical Exam    Constitutional: Pt appears well-developed and well-nourished  morbidly obese  Not in any acute distress  HENT:   Head: Normocephalic and atraumatic  Eyes: EOM are normal    Neck: Neck supple  Cardiovascular: Normal rate, regular rhythm, normal heart sounds and intact distal pulses  Exam reveals no gallop and no friction rub  No murmur heard  Pulmonary/Chest: Effort normal and breath sounds normal  No respiratory distress  Pt has no wheezes or rales  Abdominal: Soft  Nondistended nontender Bowel sounds are normal    Musculoskeletal: Normal range of motion  Neurological: alert and oriented to person, place, and time  Normal strength and sensations  Psychiatric: normal mood and affect        Additional Data:     Labs:      Results from last 7 days  Lab Units 17  0540   WBC Thousand/uL 7 86   HEMOGLOBIN g/dL 7 6*   HEMATOCRIT % 22 5*   PLATELETS Thousands/uL 243   NEUTROS PCT % 69   LYMPHS PCT % 17   MONOS PCT % 8   EOS PCT % 5       Results from last 7 days  Lab Units 11/28/17  0540  11/25/17  0428   SODIUM mmol/L 135*  < > 135*   POTASSIUM mmol/L 4 6  < > 4 3   CHLORIDE mmol/L 103  < > 103   CO2 mmol/L 26  < > 25   BUN mg/dL 36*  < > 35*   CREATININE mg/dL 1 41*  < > 1 50*   CALCIUM mg/dL 8 1*  < > 7 8*   TOTAL PROTEIN g/dL  --   --  5 4*   BILIRUBIN TOTAL mg/dL  --   --  1 27*   ALK PHOS U/L  --   --  33*   ALT U/L  --   --  13   AST U/L  --   --  26   GLUCOSE RANDOM mg/dL 159*  < > 172*   < > = values in this interval not displayed  Results from last 7 days  Lab Units 11/24/17  1519   INR  1 09       * I Have Reviewed All Lab Data Listed Above  * Additional Pertinent Lab Tests Reviewed: [address] Admission Reviewed    Imaging:    Imaging Reports Reviewed Today Include:   Imaging Personally Reviewed by Myself Includes:      Recent Cultures (last 7 days):       Results from last 7 days  Lab Units 11/24/17  1429   GRAM STAIN RESULT  No Polys or Bacteria seen   WOUND CULTURE  No growth       Last 24 Hours Medication List:     acetaminophen 975 mg Oral Q8H [address]   amLODIPine 10 mg Oral Daily   aspirin 81 mg Oral Daily   clopidogrel 75 mg Oral Daily   docusate sodium 100 mg Oral BID   doxazosin 4 mg Oral Daily   enoxaparin 1 mg/kg Subcutaneous Q12H [address]   insulin glargine 10 Units Subcutaneous HS   insulin lispro 1-6 Units Subcutaneous HS   insulin lispro 2-12 Units Subcutaneous TID AC   lisinopril 40 mg Oral Daily   metoprolol tartrate 25 mg Oral Q12H SACHIN   senna 1 tablet Oral HS        Today, Patient Was Seen By: Chetna Morris MD    ** Please Note: This note has been constructed using a voice recognition system   **

## 2017-11-28 NOTE — MEDICAL STUDENT
MEDICAL STUDENT  Inpatient Progress Note for TRAINING ONLY  Not Part of Legal Medical Record       Progress Note - Severiano Rings 80 y o  male MRN: [id number]    [facility] Encounter: [id number]      Assessment/Plan:  TIA s/p tPA (11/24): For now pt is on dual anti-plt therapy and Lovenox  Hematology to re-assess for long-term anticoagulation for UE DVT later  Acute blood loss due to R hip hematoma: Pt has received 4U pRBC  Monitor H&H closely  Continue Venofer  Acute LUE DVT: Pt on dual anti-plt therapy and Lovenox  Hematology stated that they are focusing on acute care as of now, but will re-assess later for long-term anticoagulation  CAD s/p stents x2: Baseline ST segment changes, negative troponins  Cardiology approved anti-platelet monotherapy from their standpoint  R hip revision (11/20): Stable  Pain control with tylenol and oxycodone prn  CKD3: Cr under baseline at 1 4  HTN: Continue Zestril, Cardura, Norvasc, Lopressor      Subjective:   Patient does not have any complaints this morning  States that he is still having some difficulty getting out of bed due to his left-sided weakness  No dysarthria  Objective:     Vitals: Blood pressure (!) 171/80, pulse 68, temperature 98 4 °F (36 9 °C), temperature source Oral, resp  rate 18, height 5' 10 5" (1 791 m), weight 106 kg (233 lb 4 oz), SpO2 98 %  ,Body mass index is 32 99 kg/m²        Intake/Output Summary (Last 24 hours) at 11/28/17 1037  Last data filed at 11/28/17 1026   Gross per 24 hour   Intake              720 ml   Output             1280 ml   Net             -560 ml       Physical Exam: General appearance: alert, appears stated age and cooperative  Lungs: clear to auscultation bilaterally  Heart: regular rate and rhythm, S1, S2 normal, no murmur, click, rub or gallop  Extremities: R hip hematoma with dressing and staples     Invasive Devices     Peripheral Intravenous Line            Peripheral IV 11/28/17 Left Antecubital less than 1 day                Lab, Imaging and other studies:   Hgb 7 6 today (decreased from 8 2)  Antithrombin 3 activity: 88  MRI brain (11/25): No acute disease  Diffuse generalized volume loss, moderate degree of chronic small vessel disease    Pending ASA and plavix sensitivities  VTE Pharmacologic Prophylaxis: Enoxaparin (Lovenox)

## 2017-11-28 NOTE — PROGRESS NOTES
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  TPA was given  MRI brain did not reveal acute disease  Additionally patient diagnosed with R hip hematoma s/p TPA and LUE DVT  Patient currently on therapeutic AC and dual antiplatelets  After reviewing Cardiology notes, they are ok with discontinuing Plavix  Additionally API and P2Y12 were obtained this AM and noted to be subtherapeutic  Therefore, we recommend that Plavix be discontinued and Aspirin be increased to 325mg daily  Once the patient has completed duration of anticoagulation, we would recommend resuming dual antiplatelet therapy  Additionally, API should again be checked in 1-2 weeks  I did speak with the primary team about this

## 2017-11-29 LAB
ERYTHROCYTE [DISTWIDTH] IN BLOOD BY AUTOMATED COUNT: 14.6 % (ref 11.6–15.1)
ERYTHROCYTE [DISTWIDTH] IN BLOOD BY AUTOMATED COUNT: 14.8 % (ref 11.6–15.1)
GLUCOSE SERPL-MCNC: 168 MG/DL (ref 65–140)
GLUCOSE SERPL-MCNC: 229 MG/DL (ref 65–140)
GLUCOSE SERPL-MCNC: 246 MG/DL (ref 65–140)
GLUCOSE SERPL-MCNC: 293 MG/DL (ref 65–140)
HCT VFR BLD AUTO: 20.6 % (ref 36.5–49.3)
HCT VFR BLD AUTO: 22.4 % (ref 36.5–49.3)
HGB BLD-MCNC: 6.9 G/DL (ref 12–17)
HGB BLD-MCNC: 7.6 G/DL (ref 12–17)
MCH RBC QN AUTO: 29.5 PG (ref 26.8–34.3)
MCH RBC QN AUTO: 30 PG (ref 26.8–34.3)
MCHC RBC AUTO-ENTMCNC: 33.5 G/DL (ref 31.4–37.4)
MCHC RBC AUTO-ENTMCNC: 33.9 G/DL (ref 31.4–37.4)
MCV RBC AUTO: 88 FL (ref 82–98)
MCV RBC AUTO: 89 FL (ref 82–98)
PLATELET # BLD AUTO: 276 THOUSANDS/UL (ref 149–390)
PLATELET # BLD AUTO: 277 THOUSANDS/UL (ref 149–390)
PMV BLD AUTO: 8.6 FL (ref 8.9–12.7)
PMV BLD AUTO: 9 FL (ref 8.9–12.7)
RBC # BLD AUTO: 2.34 MILLION/UL (ref 3.88–5.62)
RBC # BLD AUTO: 2.53 MILLION/UL (ref 3.88–5.62)
WBC # BLD AUTO: 7.39 THOUSAND/UL (ref 4.31–10.16)
WBC # BLD AUTO: 8.93 THOUSAND/UL (ref 4.31–10.16)

## 2017-11-29 PROCEDURE — 82948 REAGENT STRIP/BLOOD GLUCOSE: CPT

## 2017-11-29 PROCEDURE — 92526 ORAL FUNCTION THERAPY: CPT

## 2017-11-29 PROCEDURE — 85027 COMPLETE CBC AUTOMATED: CPT | Performed by: INTERNAL MEDICINE

## 2017-11-29 PROCEDURE — 97535 SELF CARE MNGMENT TRAINING: CPT

## 2017-11-29 PROCEDURE — 97116 GAIT TRAINING THERAPY: CPT

## 2017-11-29 PROCEDURE — 97110 THERAPEUTIC EXERCISES: CPT

## 2017-11-29 RX ORDER — LACTULOSE 20 G/30ML
10 SOLUTION ORAL 3 TIMES DAILY
Status: DISCONTINUED | OUTPATIENT
Start: 2017-11-29 | End: 2017-12-03

## 2017-11-29 RX ADMIN — DOCUSATE SODIUM 100 MG: 100 CAPSULE, LIQUID FILLED ORAL at 09:45

## 2017-11-29 RX ADMIN — ENOXAPARIN SODIUM 105 MG: 120 INJECTION SUBCUTANEOUS at 22:24

## 2017-11-29 RX ADMIN — INSULIN LISPRO 4 UNITS: 100 INJECTION, SOLUTION INTRAVENOUS; SUBCUTANEOUS at 12:49

## 2017-11-29 RX ADMIN — IRON SUCROSE 100 MG: 20 INJECTION, SOLUTION INTRAVENOUS at 10:05

## 2017-11-29 RX ADMIN — SENNOSIDES 8.6 MG: 8.6 TABLET, FILM COATED ORAL at 22:22

## 2017-11-29 RX ADMIN — ASPIRIN 325 MG: 325 TABLET ORAL at 09:45

## 2017-11-29 RX ADMIN — LACTULOSE 10 G: 20 SOLUTION ORAL at 14:56

## 2017-11-29 RX ADMIN — INSULIN GLARGINE 10 UNITS: 100 INJECTION, SOLUTION SUBCUTANEOUS at 22:23

## 2017-11-29 RX ADMIN — ACETAMINOPHEN 975 MG: 325 TABLET, FILM COATED ORAL at 14:55

## 2017-11-29 RX ADMIN — ACETAMINOPHEN 975 MG: 325 TABLET, FILM COATED ORAL at 05:22

## 2017-11-29 RX ADMIN — AMLODIPINE BESYLATE 10 MG: 10 TABLET ORAL at 09:50

## 2017-11-29 RX ADMIN — FOLIC ACID-PYRIDOXINE-CYANOCOBALAMIN TAB 2.5-25-2 MG 1 TABLET: 2.5-25-2 TAB at 09:46

## 2017-11-29 RX ADMIN — ENOXAPARIN SODIUM 105 MG: 120 INJECTION SUBCUTANEOUS at 09:46

## 2017-11-29 RX ADMIN — INSULIN LISPRO 2 UNITS: 100 INJECTION, SOLUTION INTRAVENOUS; SUBCUTANEOUS at 22:24

## 2017-11-29 RX ADMIN — METOPROLOL TARTRATE 25 MG: 25 TABLET ORAL at 22:22

## 2017-11-29 RX ADMIN — DOCUSATE SODIUM 100 MG: 100 CAPSULE, LIQUID FILLED ORAL at 18:11

## 2017-11-29 RX ADMIN — LISINOPRIL 40 MG: 20 TABLET ORAL at 09:50

## 2017-11-29 RX ADMIN — METOPROLOL TARTRATE 25 MG: 25 TABLET ORAL at 09:50

## 2017-11-29 RX ADMIN — INSULIN LISPRO 6 UNITS: 100 INJECTION, SOLUTION INTRAVENOUS; SUBCUTANEOUS at 18:11

## 2017-11-29 RX ADMIN — ACETAMINOPHEN 975 MG: 325 TABLET, FILM COATED ORAL at 22:23

## 2017-11-29 RX ADMIN — DOXAZOSIN 4 MG: 4 TABLET ORAL at 09:50

## 2017-11-29 RX ADMIN — LACTULOSE 10 G: 20 SOLUTION ORAL at 22:22

## 2017-11-29 RX ADMIN — INSULIN LISPRO 2 UNITS: 100 INJECTION, SOLUTION INTRAVENOUS; SUBCUTANEOUS at 09:50

## 2017-11-29 NOTE — PHYSICAL THERAPY NOTE
Physical Therapy Progress Note   11/29/17 8549   Pain Assessment   Pain Assessment 0-10   Pain Score 8   Pain Type Acute pain   Pain Location Hip   Pain Orientation Right   Pain Radiating Towards THIGH   Pain Descriptors Aching;Dull   Pain Frequency Constant/continuous   Pain Onset Ongoing   Clinical Progression Not changed   Effect of Pain on Daily Activities SIGNIFICANT LIMITATIONS/INCREASED FALL RISK   Patient's Stated Pain Goal No pain   Hospital Pain Intervention(s) Repositioned; Ambulation/increased activity;Cold applied  (COLD PACK APPLIED TO RIHT HIP AFTER SESSION )   Response to Interventions AGREEABLE TO MOBILIZE   Multiple Pain Sites No   Pain Rating: FLACC (Rest) - Face 1   Pain Rating: FLACC (Rest) - Legs 1   Pain Rating: FLACC (Rest) - Activity 0   Pain Rating: FLACC (Rest) - Cry 1   Pain Rating: FLACC (Rest) - Consolability 1   Score: FLACC (Rest) 4   Pain Rating: FLACC (Activity) - Face 1   Pain Rating: FLACC (Activity) - Legs 1   Pain Rating: FLACC (Activity) - Activity 0   Pain Rating: FLACC (Activity) - Cry 1   Pain Rating: FLACC (Activity) - Consolability 1   Score: FLACC (Activity) 4   Precautions   Total Hip Replacement ABduction; Internal rotation; Flexion  ([facility])   Restrictions/Precautions   Weight Bearing Precautions Per Order Yes   RLE Weight Bearing Per Order WBAT   Other Precautions Chair Alarm; Bed Alarm; Fall Risk;Pain;WBS;THR;Cognitive;Aspiration   General   Chart Reviewed Yes   Response to Previous Treatment Patient with no complaints from previous session  Family/Caregiver Present No   Cognition   Overall Cognitive Status Impaired   Arousal/Participation Alert; Cooperative   Attention Attends with cues to redirect   Orientation Level Oriented X4   Memory Decreased recall of precautions   Following Commands Follows one step commands with increased time or repetition   Subjective   Subjective THE PT  REPORTS ONGOING DISCOMFORT AND STIFFNESS IN RLE     Bed Mobility   Supine to Sit 3  Moderate assistance   Additional items Assist x 2;HOB elevated; Increased time required;Verbal cues;LE management   Transfers   Sit to Stand 3  Moderate assistance   Additional items Assist x 2; Increased time required;Verbal cues  (BED HEIGHT ELEVATED)   Stand to Sit 3  Moderate assistance   Additional items Assist x 2;Armrests; Increased time required;Verbal cues  (ASSISTANCE FOR HAND PLACEMENT)   Ambulation/Elevation   Gait pattern Poor UE support; Improper Weight shift;Decreased R stance; Inconsistent ricardo; Excessively slow; Forward Flexion   Gait Assistance 3  Moderate assist   Additional items Assist x 2;Verbal cues; Tactile cues  (POSTURE,PACING AND SEQUENCING RW  2ND FOR CHAIR FOLLOW)   Assistive Device Rolling walker   Distance 5'X1   Stair Management Assistance Not tested   Balance   Static Sitting Fair -  (LEFT LEAN TO DECREASE DISCOMFOT )   Static Standing Poor   Ambulatory Poor -   Endurance Deficit   Endurance Deficit Yes   Endurance Deficit Description SIGNIFICANT DECONDITIONING GIVEN COMPLICATED MDICAL COURSE   Activity Tolerance   Activity Tolerance Patient limited by fatigue;Patient limited by pain;Treatment limited secondary to medical complications (Comment)   Nurse Made Aware SPOKE WITH CARLOS   Exercises   THR Supine;15 reps;AAROM; Right  (APPROPIATE EST PERIODS )   Assessment   Prognosis Good   Problem List Decreased strength;Decreased range of motion;Decreased endurance;Decreased mobility; Impaired balance;Pain;Orthopedic restrictions;Decreased cognition; Impaired judgement;Decreased safety awareness;Decreased coordination   Assessment AT THIS TIME, THE  WAS ABLE TO RECALL2/3 HIP PRECAUTIONS WITHOUT PROMPTS  THE PT  REQUIRES ONGOING MODERATE ASSISTANCE OF 2 PEOPLE WITH CLOSE CHAIR FOLLOW GIVEN HIS DECONDITIONED STATE  ONGOIN DEFECITS REMAINS SIGNIFICANTLY BELOW BSELINE WITH ONGOING RISK FOR FALLS  CONTINUED REHAB REQUIRED UPON D/C     Goals   Patient Goals TO EAT   STG Expiration Date 12/10/17   Treatment Day 3   Plan   Treatment/Interventions Functional transfer training;LE strengthening/ROM; Therapeutic exercise; Endurance training;Bed mobility;Gait training; Compensatory technique education   Progress Progressing toward goals   PT Frequency 5x/wk; Weekend  (1X WEEKEND)   Recommendation   Recommendation (REHAB)   Equipment Recommended [facility]   PT - OK to Discharge ([address], PTA

## 2017-11-29 NOTE — SIGNIFICANT EVENT
I had a discussion with patient, son and patient's daughter-in-law who is a nurse regarding anticoagulation  I explained them all possible oral anticoagulants including the risks and benefits of each  To the patient's recent bleeding patient and the family were more inclined towards going ahead with Coumadin  Will continue with Lovenox for tonight  Recheck hemoglobin tomorrow morning  If hemoglobin stable then I will start Coumadin tomorrow  Will continue with Lovenox with bridge to Coumadin until INR therapeutic

## 2017-11-29 NOTE — PLAN OF CARE
Problem: PHYSICAL THERAPY ADULT  Goal: Performs mobility at highest level of function for planned discharge setting  See evaluation for individualized goals  Treatment/Interventions: Functional transfer training, LE strengthening/ROM, Therapeutic exercise, Endurance training, Patient/family training, Bed mobility, Gait training, Equipment eval/education  Equipment Recommended: Lillian Guthrie       See flowsheet documentation for full assessment, interventions and recommendations  Outcome: Progressing  Prognosis: Good  Problem List: Decreased strength, Decreased endurance, Impaired balance, Decreased mobility, Decreased cognition, Impaired judgement, Decreased safety awareness, Orthopedic restrictions  Assessment: EXTENDED TIME SPENT WITH PT  AND SON RE: PLAN OF CARE AND ONGOING PROGRESSION OF PT'S STATUS  ALLQUESTIONS FROM FAMILY AND PT  ANSWERED  PT  REMAINS APPROPIATE FOR ONGOING REHAB UPON D/C  Barriers to Discharge: Decreased caregiver support     Recommendation:  (REHAB)     PT - OK to Discharge: (S)  ([address] )    See flowsheet documentation for full assessment     Eulogio Bajwa, PTA

## 2017-11-29 NOTE — PROGRESS NOTES
[address] Internal Medicine Progress Note  Patient: Marbella Guadalupe 80 y o  male   MRN: [id number]  PCP: Keegan Hatch DO  [facility] Encounter: [id number]  Date Of Visit: 11/29/17    Assessment and Plan:     TIA s/p TPA on 11/24:   appreciate neuro recs     DC Plavix and continue aspirin to full dose 325 milligrams as patient on anticoagulation  Patient will need anticoagulation for acute DVT of the upper extremity for at least 6 months  Once off anticoagulation can be started back on dual antiplatelet  on statin 2/2 allergy      ABLA 2/2 right hip hematoma:   Hemoglobin dropped from 8 0 yesterday to 7 6 this morning  Patient denies any hip pain or pain anywhere else  Also denies any evident today  Pt received 4U PRBC     Will monitor H/H closely      Acute LUE DVT:   pt on therapeutic Lovenox  Continue   Patient evaluated by Hematology  I discussed with the patient and the son at bedside and options about oral anticoagulation  I gave them the options of warfarin, Pradaxa, Xarelto, Eliquis  I explained them the benefits and problems of each    They will discuss with patient's daughter-in-law who is a nurse and let us know by today evening      CAD s/p PCI x 2, last stent 2/2016:   EKG shows ST elevations, but these appear to be present in prior EKGs     Trops neg x 3     Plavix discontinued and aspirin increased to full dose      S/p R hip revision MAREN 11/20: per ortho, WBAT   This appears stable   Pain control with oxycodone prn and tylenol rtc      CKD3: Cr under baseline 1 4-1 5     HTN: Restarted pt's home meds including ACEi and Cardura   C/w BB and Norvasc              VTE Pharmacologic Prophylaxis:   Pharmacologic: Enoxaparin (Lovenox)  Mechanical VTE Prophylaxis in Place: Yes     Patient Centered Rounds: I have performed bedside rounds with nursing staff today      Discussions with Specialists or Other Care Team Provider: neuro and heme     Education and Discussions with Family / Patient: pt - declined call to family     Time Spent for Care: 30 minutes   More than 50% of total time spent on counseling and coordination of care as described above      Current Length of Stay: 5 day(s)     Current Patient Status: Inpatient   Certification Statement: The patient will continue to require additional inpatient hospital stay due to need to monitor for bleeding     Discharge Plan / Estimated Discharge Date:  possibly tomorrow if hemoglobin stable     Code Status: Level 1 - Full Code        Subjective:   Pt seen and examined by me this morning  Pt complained of  pain in his right hip when he moves his leg  It is not worse compared to before  Objective:     Vitals:   Temp (24hrs), Av 7 °F (37 1 °C), Min:98 °F (36 7 °C), Max:99 4 °F (37 4 °C)    HR:  [71-86] 80  Resp:  [18] 18  BP: (115-171)/(58-79) 164/67  SpO2:  [97 %-99 %] 98 %  Body mass index is 32 99 kg/m²  Input and Output Summary (last 24 hours): Intake/Output Summary (Last 24 hours) at 17 1459  Last data filed at 17 1142   Gross per 24 hour   Intake             1080 ml   Output              820 ml   Net              260 ml       Physical Exam:     Physical Exam    Constitutional: Pt appears well-developed and well-nourished  morbidly obese  Not in any acute distress  Cardiovascular: Normal rate, regular rhythm, normal heart sounds and intact distal pulses  Exam reveals no gallop and no friction rub  No murmur heard  Pulmonary/Chest: Effort normal and breath sounds normal  No respiratory distress  Pt has no wheezes or rales  Abdominal: Soft  Nondistended nontender Bowel sounds are normal    Musculoskeletal:  Decreased range of motion right lower extremity  Neurological: alert and oriented to person, place, and time  Normal strength and sensations  Psychiatric: normal mood and affect      Additional Data:     Labs:      Results from last 7 days  Lab Units 17  0528  17  0540   WBC Thousand/uL 8 93 --  7 86   HEMOGLOBIN g/dL 7 6*  < > 7 6*   HEMATOCRIT % 22 4*  < > 22 5*   PLATELETS Thousands/uL 277  --  243   NEUTROS PCT %  --   --  69   LYMPHS PCT %  --   --  17   MONOS PCT %  --   --  8   EOS PCT %  --   --  5   < > = values in this interval not displayed  Results from last 7 days  Lab Units 11/28/17  0540  11/25/17  0428   SODIUM mmol/L 135*  < > 135*   POTASSIUM mmol/L 4 6  < > 4 3   CHLORIDE mmol/L 103  < > 103   CO2 mmol/L 26  < > 25   BUN mg/dL 36*  < > 35*   CREATININE mg/dL 1 41*  < > 1 50*   CALCIUM mg/dL 8 1*  < > 7 8*   TOTAL PROTEIN g/dL  --   --  5 4*   BILIRUBIN TOTAL mg/dL  --   --  1 27*   ALK PHOS U/L  --   --  33*   ALT U/L  --   --  13   AST U/L  --   --  26   GLUCOSE RANDOM mg/dL 159*  < > 172*   < > = values in this interval not displayed  Results from last 7 days  Lab Units 11/24/17  1519   INR  1 09       * I Have Reviewed All Lab Data Listed Above  * Additional Pertinent Lab Tests Reviewed:  [address] Admission Reviewed    Imaging:    Imaging Reports Reviewed Today Include:   Imaging Personally Reviewed by Myself Includes:      Recent Cultures (last 7 days):       Results from last 7 days  Lab Units 11/24/17  1429   GRAM STAIN RESULT  No Polys or Bacteria seen   WOUND CULTURE  No growth       Last 24 Hours Medication List:     acetaminophen 975 mg Oral Q8H [address]   amLODIPine 10 mg Oral Daily   aspirin 325 mg Oral Daily   docusate sodium 100 mg Oral BID   doxazosin 4 mg Oral Daily   enoxaparin 1 mg/kg Subcutaneous K73M [address]   folic acid-pyridoxine-cyanocobalamin 1 tablet Oral Daily   insulin glargine 10 Units Subcutaneous HS   insulin lispro 1-6 Units Subcutaneous HS   insulin lispro 2-12 Units Subcutaneous TID AC   iron sucrose 100 mg Intravenous Daily   lactulose 10 g Oral TID   lisinopril 40 mg Oral Daily   metoprolol tartrate 25 mg Oral Q12H SACHIN   senna 1 tablet Oral HS        Today, Patient Was Seen By: Matthew Vidal MD    ** Please Note: This note has been constructed using a voice recognition system   **

## 2017-11-29 NOTE — PLAN OF CARE
Problem: PHYSICAL THERAPY ADULT  Goal: Performs mobility at highest level of function for planned discharge setting  See evaluation for individualized goals  Treatment/Interventions: Functional transfer training, LE strengthening/ROM, Therapeutic exercise, Endurance training, Patient/family training, Bed mobility, Gait training, Equipment eval/education  Equipment Recommended: Melissa Farnsworth       See flowsheet documentation for full assessment, interventions and recommendations  Outcome: Progressing  Prognosis: Good  Problem List: Decreased strength, Decreased range of motion, Decreased endurance, Decreased mobility, Impaired balance, Pain, Orthopedic restrictions, Decreased cognition, Impaired judgement, Decreased safety awareness, Decreased coordination  Assessment: AT THIS TIME, THE  WAS ABLE TO RECALL2/3 HIP PRECAUTIONS WITHOUT PROMPTS  THE PT  REQUIRES ONGOING MODERATE ASSISTANCE OF 2 PEOPLE WITH CLOSE CHAIR FOLLOW GIVEN HIS DECONDITIONED STATE  ONGOIN DEFECITS REMAINS SIGNIFICANTLY BELOW BSELINE WITH ONGOING RISK FOR FALLS  CONTINUED REHAB REQUIRED UPON D/C  Recommendation:  (REHAB)     PT - OK to Discharge: (S)  ([facility] )    See flowsheet documentation for full assessment     Kathy Jolly, PTA

## 2017-11-29 NOTE — PHYSICAL THERAPY NOTE
Physical Therapy Progress Note     11/29/17 1530   Pain Assessment   Pain Assessment FLACC   Pain Rating: FLACC (Rest) - Face 0   Pain Rating: FLACC (Rest) - Legs 0   Pain Rating: FLACC (Rest) - Activity 0   Pain Rating: FLACC (Rest) - Cry 0   Pain Rating: FLACC (Rest) - Consolability 0   Score: FLACC (Rest) 0   Pain Rating: FLACC (Activity) - Face 0   Pain Rating: FLACC (Activity) - Legs 0   Pain Rating: FLACC (Activity) - Activity 0   Pain Rating: FLACC (Activity) - Cry 0   Pain Rating: FLACC (Activity) - Consolability 0   Score: FLACC (Activity) 0   Precautions   Total Hip Replacement (RIGHT POSTERIOR HIP PRECAUTIONS )   Restrictions/Precautions   Weight Bearing Precautions Per Order Yes   RLE Weight Bearing Per Order WBAT   Other Precautions Cognitive;THR;WBS;Chair Alarm; Fall Risk;Pain   General   Chart Reviewed Yes   Response to Previous Treatment Other (Comment)  (PT  AND FAMILY QUESTIONS ANSWERED TO THEIR SATISFACTION )   Family/Caregiver Present Yes   Balance   Static Sitting Fair -  (CHAIR ALARM ACTIVATED )   Assessment   Prognosis Good   Problem List Decreased strength;Decreased endurance; Impaired balance;Decreased mobility; Decreased cognition; Impaired judgement;Decreased safety awareness;Orthopedic restrictions   Assessment EXTENDED TIME SPENT WITH PT  AND SON RE: PLAN OF CARE AND ONGOING PROGRESSION OF PT'S STATUS  ALLQUESTIONS FROM FAMILY AND PT  ANSWERED  PT  REMAINS APPROPIATE FOR ONGOING REHAB UPON D/C  Barriers to Discharge Decreased caregiver support   Goals   Patient Goals NONE OFFERED BY PT    STG Expiration Date 12/10/17   Treatment Day 4   Plan   Treatment/Interventions (PT  EDUCATION  )   Progress Slow progress, medical status limitations   PT Frequency 5x/wk; Weekend  (1X/WEEKEND)   Recommendation   Recommendation (REHAB)   Equipment Recommended [facility]   PT - OK to Discharge ([address], PTA

## 2017-11-29 NOTE — PROGRESS NOTES
Patient was seen earlier today around 1:00 p m  Lilia Tobias Patient's son was in the room  Patient is being followed for left upper arm DVT and has been on Lovenox, CVA with left hemiparesis and that has improved significantly, anemia secondary to acute blood loss post right hip surgery and post tPA  Hemoglobin is low and has decreased more slightly  Patient is receiving intravenous Venofer  Noted CBC  Blood counts are being monitored  There is no active bleeding at present  Patient states he has been feeling okay except for some pain in right hip  No shortness of breath at rest   No palpitations  He has started physical therapy  Patient is clinically stable  Primary service asked if for Lovenox could be changed to NOAC and that should be okay  Discussed with patient and explained  Condition and counts will be monitored

## 2017-11-29 NOTE — SPEECH THERAPY NOTE
Speech Language/Pathology                             SLP  Note  Patient Name: Talitha Burkitt  Today's Date: 11/29/2017     Subjective:  "I don't like pizza or salad, but I like bagels and steak would want one some times" re: desire for dense solid foods  Objective:  Pt was assessed with cottage cheese and hard cookies (pt had already consumed multiple soft fruits)  Pt demonstrated functional mastication  No significant oral retention noted today  Swallows were prompt and strong  No s/s of pharyngeal dysphagia noted  No s/s of aspiration noted even with successive sips of thin liquids  SLP educated pt on diet options in this facility  Pt stated "Ok to say regular diet and I pick my food  "   Assessment:  Pt tolerated cookies andthin liquids c no significant s/s oral or pharyngeal dysphagia  Pt does prefer some soft foods, but agreed to "regular" textured food so he can choose from full menu        Plan/Recommendations:  Consider upgrade to regular textured food and thin liquid, offer menus so pt can select

## 2017-11-29 NOTE — PLAN OF CARE
Problem: SLP ADULT - SWALLOWING, IMPAIRED  Goal: Advance to least restrictive diet without signs or symptoms of aspiration for planned discharge setting  See evaluation for individualized goals  Patient will:    1  Tolerate level 3 diet and thin liquids without s/s of aspiration or dysphagia  2  Pt will tolerate regular trials with SLP without s/s of oral or pharyngeal dysphagia        Outcome: Completed Date Met: 11/29/17

## 2017-11-29 NOTE — MEDICAL STUDENT
MEDICAL STUDENT  Inpatient Progress Note for TRAINING ONLY  Not Part of Legal Medical Record       Progress Note - Adamrais Mackenzie 80 y o  male MRN: [id number]    Unit/Bed#: [facility] 705-01 Encounter: [id number]      Assessment/Plan:  TIA s/p tPA 11/24: Plavix d/c'd and ASA increased to 325   -Patient currently being anticoagulated with Lovenox    -Will switch him to NOAC for [facility], as his Hgb is relatively stable  ABLA 2/2 R hip hematoma: Hgb 7 6 this morning from 8 0 yesterday afternoon; relatively stable  -Monitor H&H closely   -Continue Venofer   -Transfuse if clinically indicated  Acute LUE DVT: Will begin NOAC and d/c Lovenox   -Discussed with hematology  CAD s/p stents x2: ASA increased to 325 and Plavix d/c'd, per Cardiology and Neurology recommendations  S/p R hip revision: Pain control with Tylenol and Oxycodone prn  CKD3: Cr under baseline 1 4  HTN: Restarted home medications    Subjective:   Patient says that he is feeling weak today, especially in the RLE, but he worked well with PT this morning  He also feels more tired than yesterday  Denies any UE pain, but states that his RLE is sore and requested Tylenol  Objective:     Vitals: Blood pressure 164/67, pulse 80, temperature 98 °F (36 7 °C), temperature source Oral, resp  rate 18, height 5' 10 5" (1 791 m), weight 106 kg (233 lb 4 oz), SpO2 98 %  ,Body mass index is 32 99 kg/m²  Intake/Output Summary (Last 24 hours) at 11/29/17 1202  Last data filed at 11/29/17 0900   Gross per 24 hour   Intake              960 ml   Output              620 ml   Net              340 ml       Physical Exam: General appearance: alert, appears stated age and cooperative  Lungs: clear to auscultation bilaterally  Heart: regular rate and rhythm, S1, S2 normal, no murmur, click, rub or gallop  Abdomen: soft, non-tender; bowel sounds normal; no masses,  no organomegaly  Extremities: Bandage on right hip  No edema       Invasive Devices     Peripheral Intravenous Line Peripheral IV 11/28/17 Left Antecubital 1 day                Lab, Imaging and other studies:   U/S Kidney bladder: Stable left renal cyst that appears simple     Lab Results   Component Value Date    WBC 8 93 11/29/2017    HGB 7 6 (L) 11/29/2017    HCT 22 4 (L) 11/29/2017    MCV 89 11/29/2017     11/29/2017     Lab Results   Component Value Date    GLUCOSE 159 (H) 11/28/2017    CALCIUM 8 1 (L) 11/28/2017     (L) 11/28/2017    K 4 6 11/28/2017    CO2 26 11/28/2017     11/28/2017    BUN 36 (H) 11/28/2017    CREATININE 1 41 (H) 11/28/2017         VTE Pharmacologic Prophylaxis: Enoxaparin (Lovenox)  VTE Mechanical Prophylaxis: sequential compression device

## 2017-11-30 ENCOUNTER — APPOINTMENT (INPATIENT)
Dept: RADIOLOGY | Facility: HOSPITAL | Age: 82
DRG: 553 | End: 2017-11-30
Payer: MEDICARE

## 2017-11-30 LAB
ABO GROUP BLD: NORMAL
ANION GAP SERPL CALCULATED.3IONS-SCNC: 9 MMOL/L (ref 4–13)
BLD GP AB SCN SERPL QL: NEGATIVE
BUN SERPL-MCNC: 30 MG/DL (ref 5–25)
CALCIUM SERPL-MCNC: 8.3 MG/DL (ref 8.3–10.1)
CARDIOLIPIN IGA SER IA-ACNC: <9 APL U/ML (ref 0–11)
CARDIOLIPIN IGG SER IA-ACNC: <9 GPL U/ML (ref 0–14)
CARDIOLIPIN IGM SER IA-ACNC: <9 MPL U/ML (ref 0–12)
CHLORIDE SERPL-SCNC: 101 MMOL/L (ref 100–108)
CO2 SERPL-SCNC: 25 MMOL/L (ref 21–32)
CREAT SERPL-MCNC: 1.49 MG/DL (ref 0.6–1.3)
ERYTHROCYTE [DISTWIDTH] IN BLOOD BY AUTOMATED COUNT: 14.6 % (ref 11.6–15.1)
GFR SERPL CREATININE-BSD FRML MDRD: 44 ML/MIN/1.73SQ M
GLUCOSE SERPL-MCNC: 147 MG/DL (ref 65–140)
GLUCOSE SERPL-MCNC: 231 MG/DL (ref 65–140)
GLUCOSE SERPL-MCNC: 243 MG/DL (ref 65–140)
GLUCOSE SERPL-MCNC: 258 MG/DL (ref 65–140)
GLUCOSE SERPL-MCNC: 268 MG/DL (ref 65–140)
HCT VFR BLD AUTO: 20 % (ref 36.5–49.3)
HCT VFR BLD AUTO: 20.4 % (ref 36.5–49.3)
HCT VFR BLD AUTO: 23.5 % (ref 36.5–49.3)
HCT VFR BLD AUTO: 24.9 % (ref 36.5–49.3)
HCT VFR BLD AUTO: 25 % (ref 36.5–49.3)
HGB BLD-MCNC: 6.5 G/DL (ref 12–17)
HGB BLD-MCNC: 6.8 G/DL (ref 12–17)
HGB BLD-MCNC: 7.8 G/DL (ref 12–17)
HGB BLD-MCNC: 8.4 G/DL (ref 12–17)
HGB BLD-MCNC: 8.4 G/DL (ref 12–17)
MCH RBC QN AUTO: 29.8 PG (ref 26.8–34.3)
MCHC RBC AUTO-ENTMCNC: 33.6 G/DL (ref 31.4–37.4)
MCV RBC AUTO: 89 FL (ref 82–98)
PLATELET # BLD AUTO: 335 THOUSANDS/UL (ref 149–390)
PMV BLD AUTO: 8.9 FL (ref 8.9–12.7)
POTASSIUM SERPL-SCNC: 4.5 MMOL/L (ref 3.5–5.3)
PROT C AG ACT/NOR PPP IA: 126 % OF NORMAL (ref 60–150)
PROT S ACT/NOR PPP: 104 % (ref 63–140)
PROT S ACT/NOR PPP: 108 % (ref 57–157)
PROT S PPP-ACNC: 75 % (ref 60–150)
RBC # BLD AUTO: 2.82 MILLION/UL (ref 3.88–5.62)
RH BLD: POSITIVE
SODIUM SERPL-SCNC: 135 MMOL/L (ref 136–145)
SPECIMEN EXPIRATION DATE: NORMAL
WBC # BLD AUTO: 11.53 THOUSAND/UL (ref 4.31–10.16)

## 2017-11-30 PROCEDURE — 73700 CT LOWER EXTREMITY W/O DYE: CPT

## 2017-11-30 PROCEDURE — 97116 GAIT TRAINING THERAPY: CPT

## 2017-11-30 PROCEDURE — 97110 THERAPEUTIC EXERCISES: CPT

## 2017-11-30 PROCEDURE — 85018 HEMOGLOBIN: CPT | Performed by: PHYSICIAN ASSISTANT

## 2017-11-30 PROCEDURE — 80048 BASIC METABOLIC PNL TOTAL CA: CPT | Performed by: INTERNAL MEDICINE

## 2017-11-30 PROCEDURE — 85018 HEMOGLOBIN: CPT | Performed by: INTERNAL MEDICINE

## 2017-11-30 PROCEDURE — 97530 THERAPEUTIC ACTIVITIES: CPT | Performed by: STUDENT IN AN ORGANIZED HEALTH CARE EDUCATION/TRAINING PROGRAM

## 2017-11-30 PROCEDURE — 97535 SELF CARE MNGMENT TRAINING: CPT

## 2017-11-30 PROCEDURE — 85014 HEMATOCRIT: CPT | Performed by: PHYSICIAN ASSISTANT

## 2017-11-30 PROCEDURE — 74176 CT ABD & PELVIS W/O CONTRAST: CPT

## 2017-11-30 PROCEDURE — 86900 BLOOD TYPING SEROLOGIC ABO: CPT | Performed by: INTERNAL MEDICINE

## 2017-11-30 PROCEDURE — 86923 COMPATIBILITY TEST ELECTRIC: CPT

## 2017-11-30 PROCEDURE — 30233N1 TRANSFUSION OF NONAUTOLOGOUS RED BLOOD CELLS INTO PERIPHERAL VEIN, PERCUTANEOUS APPROACH: ICD-10-PCS | Performed by: GENERAL PRACTICE

## 2017-11-30 PROCEDURE — 82948 REAGENT STRIP/BLOOD GLUCOSE: CPT

## 2017-11-30 PROCEDURE — P9021 RED BLOOD CELLS UNIT: HCPCS

## 2017-11-30 PROCEDURE — 97535 SELF CARE MNGMENT TRAINING: CPT | Performed by: STUDENT IN AN ORGANIZED HEALTH CARE EDUCATION/TRAINING PROGRAM

## 2017-11-30 PROCEDURE — 85027 COMPLETE CBC AUTOMATED: CPT | Performed by: INTERNAL MEDICINE

## 2017-11-30 PROCEDURE — 86901 BLOOD TYPING SEROLOGIC RH(D): CPT | Performed by: INTERNAL MEDICINE

## 2017-11-30 PROCEDURE — 85014 HEMATOCRIT: CPT | Performed by: INTERNAL MEDICINE

## 2017-11-30 PROCEDURE — 86850 RBC ANTIBODY SCREEN: CPT | Performed by: INTERNAL MEDICINE

## 2017-11-30 RX ORDER — BISACODYL 10 MG
10 SUPPOSITORY, RECTAL RECTAL ONCE
Status: COMPLETED | OUTPATIENT
Start: 2017-11-30 | End: 2017-11-30

## 2017-11-30 RX ADMIN — LACTULOSE 10 G: 20 SOLUTION ORAL at 17:21

## 2017-11-30 RX ADMIN — LISINOPRIL 40 MG: 20 TABLET ORAL at 10:38

## 2017-11-30 RX ADMIN — INSULIN LISPRO 6 UNITS: 100 INJECTION, SOLUTION INTRAVENOUS; SUBCUTANEOUS at 17:19

## 2017-11-30 RX ADMIN — METOPROLOL TARTRATE 25 MG: 25 TABLET ORAL at 10:38

## 2017-11-30 RX ADMIN — SENNOSIDES 8.6 MG: 8.6 TABLET, FILM COATED ORAL at 21:55

## 2017-11-30 RX ADMIN — LACTULOSE 10 G: 20 SOLUTION ORAL at 21:55

## 2017-11-30 RX ADMIN — BISACODYL 10 MG: 10 SUPPOSITORY RECTAL at 12:28

## 2017-11-30 RX ADMIN — IRON SUCROSE 100 MG: 20 INJECTION, SOLUTION INTRAVENOUS at 11:25

## 2017-11-30 RX ADMIN — AMLODIPINE BESYLATE 10 MG: 10 TABLET ORAL at 10:39

## 2017-11-30 RX ADMIN — METOPROLOL TARTRATE 25 MG: 25 TABLET ORAL at 21:55

## 2017-11-30 RX ADMIN — DOCUSATE SODIUM 100 MG: 100 CAPSULE, LIQUID FILLED ORAL at 10:38

## 2017-11-30 RX ADMIN — ACETAMINOPHEN 975 MG: 325 TABLET, FILM COATED ORAL at 14:24

## 2017-11-30 RX ADMIN — LACTULOSE 10 G: 20 SOLUTION ORAL at 10:38

## 2017-11-30 RX ADMIN — INSULIN LISPRO 3 UNITS: 100 INJECTION, SOLUTION INTRAVENOUS; SUBCUTANEOUS at 21:53

## 2017-11-30 RX ADMIN — DOCUSATE SODIUM 100 MG: 100 CAPSULE, LIQUID FILLED ORAL at 17:21

## 2017-11-30 RX ADMIN — ACETAMINOPHEN 975 MG: 325 TABLET, FILM COATED ORAL at 06:41

## 2017-11-30 RX ADMIN — INSULIN LISPRO 4 UNITS: 100 INJECTION, SOLUTION INTRAVENOUS; SUBCUTANEOUS at 11:32

## 2017-11-30 RX ADMIN — ASPIRIN 325 MG: 325 TABLET ORAL at 10:38

## 2017-11-30 RX ADMIN — ACETAMINOPHEN 975 MG: 325 TABLET, FILM COATED ORAL at 21:54

## 2017-11-30 RX ADMIN — FOLIC ACID-PYRIDOXINE-CYANOCOBALAMIN TAB 2.5-25-2 MG 1 TABLET: 2.5-25-2 TAB at 10:39

## 2017-11-30 RX ADMIN — POLYETHYLENE GLYCOL 3350 17 G: 17 POWDER, FOR SOLUTION ORAL at 10:37

## 2017-11-30 RX ADMIN — INSULIN GLARGINE 10 UNITS: 100 INJECTION, SOLUTION SUBCUTANEOUS at 21:54

## 2017-11-30 RX ADMIN — DOXAZOSIN 4 MG: 4 TABLET ORAL at 10:39

## 2017-11-30 NOTE — PROGRESS NOTES
Hemoglobin 6 9 this pm  Pt asymptomatic  /60BP 80HR  Thea Phillpis from [facility] aware  Order to re-check tomorrow at 0100   No interventions as of this time

## 2017-11-30 NOTE — PROGRESS NOTES
[address] Internal Medicine Progress Note  Patient: Rosa tSern 80 y o  male   MRN: [id number]  PCP: Suyapa Whitman DO  Unit/Bed#: [facility] 705-01 Encounter: [id number]  Date Of Visit: 11/30/17    Assessment and Plan:     TIA s/p TPA on 11/24:   appreciate neuro recs     continue aspirin to full dose 325 milligrams as patient on anticoagulation  Plavix discontinued  Not on statin 2/2 allergy      ABLA 2/2 right hip hematoma:   Hemoglobin dropped to 6 5 this morning from 7 6 yesterday  Patient complained of mild worsening hip pain today     Patient received 1 unit packed red cell  Will monitor H/H closely  Will stop Lovenox for now  Stat CT scan of pelvis and right lower extremity ordered - results reviewed  Discussed the results of the CT scan with Ortho  They recommended consulting surgery  Surgery consult ordered      Acute LUE DVT:   pt on therapeutic Lovenox  Will hold off today because of the drop in hemoglobin  Patient and the family opted for Coumadin for anticoagulation      CAD s/p PCI x 2, last stent 2/2016:   EKG showed ST elevations, but these appear to be present in prior EKGs     Trops neg x 3     Plavix discontinued and aspirin increased to full dose      S/p R hip revision MAREN 11/20: per ortho, WBAT   This appears stable   Pain control with and tylenol rtc  Patient did not tolerate oxycodone well    So it was discontinued    CKD3: Cr under baseline 1 4-1 5     HTN: Restarted pt's home meds including ACEi and Cardura   C/w BB and Norvasc              VTE Pharmacologic Prophylaxis:   Pharmacologic: Enoxaparin (Lovenox)  Mechanical VTE Prophylaxis in Place: Yes     Patient Centered Rounds: I have performed bedside rounds with nursing staff today      Discussions with Specialists or Other Care Team Provider: neuro and heme     Education and Discussions with Family / Patient:  Call the son and informed him      Time Spent for Care: 30 minutes   More than 50% of total time spent on counseling and coordination of care as described above      Current Length of Stay: 6 day(s)     Current Patient Status: Inpatient   Certification Statement: The patient will continue to require additional inpatient hospital stay due to need to monitor for bleeding     Discharge Plan / Estimated Discharge Date:  possibly tomorrow if hemoglobin stable     Code Status: Level 1 - Full Code        Subjective:   Pt seen and examined by me this morning  Pt complained of  right hip pain with movement  He feels it is a little worse compared to yesterday  Objective:     Vitals:   Temp (24hrs), Av 5 °F (36 9 °C), Min:98 1 °F (36 7 °C), Max:98 9 °F (37 2 °C)    HR:  [69-80] 69  Resp:  [16-18] 18  BP: (132-164)/(55-74) 164/74  SpO2:  [95 %-98 %] 96 %  Body mass index is 32 99 kg/m²  Input and Output Summary (last 24 hours): Intake/Output Summary (Last 24 hours) at 17 1433  Last data filed at 17 1300   Gross per 24 hour   Intake              645 ml   Output              925 ml   Net             -280 ml       Physical Exam:     Physical Exam    Constitutional: Pt appears well-developed and well-nourished   morbidly obese  Not in any acute distress  Cardiovascular: Normal rate, regular rhythm, normal heart sounds and intact distal pulses   Exam reveals no gallop and no friction rub   No murmur heard  Pulmonary/Chest: Effort normal and breath sounds normal  No respiratory distress  Pt has no wheezes or rales  Abdominal: Soft   Nondistended nontender Bowel sounds are normal    Musculoskeletal:  Decreased range of motion right lower extremity  Tenderness to palpation on right thigh laterally  Neurological: alert and oriented to person, place, and time  Normal strength and sensations  Psychiatric: normal mood and affect      Additional Data:     Labs:      Results from last 7 days  Lab Units 17  1238  17  0540   WBC Thousand/uL 11 53*  < > 7 86   HEMOGLOBIN g/dL 8 4*  8 4*  < > 7 6*   HEMATOCRIT % 25 0*  24 9*  < > 22 5*   PLATELETS Thousands/uL 335  < > 243   NEUTROS PCT %  --   --  69   LYMPHS PCT %  --   --  17   MONOS PCT %  --   --  8   EOS PCT %  --   --  5   < > = values in this interval not displayed  Results from last 7 days  Lab Units 11/28/17  0540  11/25/17  0428   SODIUM mmol/L 135*  < > 135*   POTASSIUM mmol/L 4 6  < > 4 3   CHLORIDE mmol/L 103  < > 103   CO2 mmol/L 26  < > 25   BUN mg/dL 36*  < > 35*   CREATININE mg/dL 1 41*  < > 1 50*   CALCIUM mg/dL 8 1*  < > 7 8*   TOTAL PROTEIN g/dL  --   --  5 4*   BILIRUBIN TOTAL mg/dL  --   --  1 27*   ALK PHOS U/L  --   --  33*   ALT U/L  --   --  13   AST U/L  --   --  26   GLUCOSE RANDOM mg/dL 159*  < > 172*   < > = values in this interval not displayed  Results from last 7 days  Lab Units 11/24/17  1519   INR  1 09       * I Have Reviewed All Lab Data Listed Above  * Additional Pertinent Lab Tests Reviewed: [address] Admission Reviewed    Imaging:    Imaging Reports Reviewed Today Include:   Imaging Personally Reviewed by Myself Includes:      Recent Cultures (last 7 days):       Results from last 7 days  Lab Units 11/24/17  1429   GRAM STAIN RESULT  No Polys or Bacteria seen   WOUND CULTURE  No growth       Last 24 Hours Medication List:     acetaminophen 975 mg Oral Q8H [address]   amLODIPine 10 mg Oral Daily   aspirin 325 mg Oral Daily   docusate sodium 100 mg Oral BID   doxazosin 4 mg Oral Daily   folic acid-pyridoxine-cyanocobalamin 1 tablet Oral Daily   insulin glargine 10 Units Subcutaneous HS   insulin lispro 1-6 Units Subcutaneous HS   insulin lispro 2-12 Units Subcutaneous TID AC   iron sucrose 100 mg Intravenous Daily   lactulose 10 g Oral TID   lisinopril 40 mg Oral Daily   metoprolol tartrate 25 mg Oral Q12H SACHIN   senna 1 tablet Oral HS        Today, Patient Was Seen By: Nabeel Ni MD    ** Please Note: This note has been constructed using a voice recognition system   **

## 2017-11-30 NOTE — PLAN OF CARE
Problem: PHYSICAL THERAPY ADULT  Goal: Performs mobility at highest level of function for planned discharge setting  See evaluation for individualized goals  Treatment/Interventions: Functional transfer training, LE strengthening/ROM, Therapeutic exercise, Endurance training, Patient/family training, Bed mobility, Gait training, Equipment eval/education  Equipment Recommended: Berry Letters       See flowsheet documentation for full assessment, interventions and recommendations  Outcome: Progressing  Prognosis: Good  Problem List: Decreased strength, Decreased endurance, Impaired balance, Decreased mobility, Decreased skin integrity, Orthopedic restrictions, Pain, Decreased safety awareness, Decreased cognition, Impaired judgement  Assessment: LIMITED INTERVENTION PERFORMED THIS A M  AS BLOOD HANGING AND PT  AWAITING TRANSPORT FOR CT SCAN  OVERALL THE PT  CONTINUES TO EXHIBT ONGOING DECONDITIONING AND LIMITATIONS RELATED TO PAIN  D/C PLAN FOR REHAB REMAINS APPROPIATE  Barriers to Discharge: Decreased caregiver support     Recommendation:  (REHAB)     PT - OK to Discharge: (S) Yes ([address] [place] [address] )    See flowsheet documentation for full assessment     Usman Locke, PTA

## 2017-11-30 NOTE — PHYSICAL THERAPY NOTE
Physical Therapy Progress Note   11/30/17 4255   Pain Assessment   Pain Assessment 0-10   Pain Score 7   Pain Type Acute pain   Pain Location Hip   Pain Orientation Right   Pain Radiating Towards THIGH   Pain Descriptors Aching;Dull   Pain Frequency Constant/continuous   Pain Onset Ongoing   Clinical Progression Gradually improving   Effect of Pain on Daily Activities PROGRESSING MOBILITY  Patient's Stated Pain Goal No pain   Hospital Pain Intervention(s) Repositioned; Ambulation/increased activity   Response to Interventions IMPROVED COMFORT WITH RETURN TO BED  Multiple Pain Sites No   Pain Rating: FLACC (Rest) - Face 1   Pain Rating: FLACC (Rest) - Legs 0   Pain Rating: FLACC (Rest) - Activity 0   Pain Rating: FLACC (Rest) - Cry 1   Pain Rating: FLACC (Rest) - Consolability 1   Score: FLACC (Rest) 3   Pain Rating: FLACC (Activity) - Face 1   Pain Rating: FLACC (Activity) - Legs 0   Pain Rating: FLACC (Activity) - Activity 0   Pain Rating: FLACC (Activity) - Cry 1   Pain Rating: FLACC (Activity) - Consolability 1   Score: FLACC (Activity) 3   Restrictions/Precautions   Weight Bearing Precautions Per Order Yes   RLE Weight Bearing Per Order WBAT   Other Precautions Chair Alarm; Bed Alarm;THR;WBS;Fall Risk;Pain   General   Chart Reviewed Yes   Response to Previous Treatment Patient with no complaints from previous session  Family/Caregiver Present No   Cognition   Overall Cognitive Status Impaired   Arousal/Participation Alert; Cooperative   Attention Attends with cues to redirect   Orientation Level Oriented X4   Memory Decreased recall of precautions  (CUEING FOR RECALL OF HIP PRECAUTIONS )   Following Commands Follows one step commands with increased time or repetition   Subjective   Subjective THE PT  REPORTS INCREASED STIFFNESS IN RECLINER AND WANTING TO WALK AND RETURN TO BED  Bed Mobility   Sit to Supine 3  Moderate assistance   Additional items Assist x 2; Increased time required;Verbal cues;LE management  (MAINTAINING HIP PRECAUTIONS )   Transfers   Sit to Stand 3  Moderate assistance   Additional items Assist x 2;Armrests; Increased time required;Verbal cues  (DECREASED WEIGHT SHIFTING)   Stand to Sit 3  Moderate assistance   Additional items Assist x 1; Increased time required;Verbal cues; Bed elevated;Armrests   Ambulation/Elevation   Gait pattern Improper Weight shift; Poor UE support; Antalgic; Forward Flexion;Decreased R stance; Inconsistent ricardo; Short stride; Excessively slow   Gait Assistance 3  Moderate assist   Additional items Assist x 1;Assist x 2;Verbal cues; Tactile cues  (SECOND PERSON FOR LINES/CHAIR)   Assistive Device Rolling walker   Distance 40'X1  (APPROPIATE STANDING REST BREAKS )   Stair Management Assistance Not tested   Wheelchair Activities   Wheelchair Cushion Pressure relieving  (SOFT CARE)   Balance   Static Sitting Fair -  (CHAIR ALARM ACTIVATED )   Static Standing Poor   Ambulatory Poor   Endurance Deficit   Endurance Deficit Yes   Endurance Deficit Description SLOWLY IMPROVING ACTIVITY TOLERANCE   Activity Tolerance   Activity Tolerance Patient limited by fatigue;Patient limited by pain   Medical Staff Made Aware SPOKE WITH Porsha Wright FROM CASE MANAGEMENT   Nurse Made Aware SPOKE WITH [facility]   Exercises   THR Sitting;15 reps;AAROM; Right   Assessment   Prognosis Good   Problem List Decreased strength;Decreased range of motion;Decreased endurance; Impaired balance;Decreased mobility; Decreased coordination;Decreased safety awareness;Pain;Orthopedic restrictions; Impaired judgement;Decreased cognition   Assessment PRESENTLY THE PT  CONTINUES TO BE LIMITED BY ONGOING DECONDITIONING WITH COMPLICATED MEDICAL COURSE  CONTINUED USE OF VERBAL CUEING REQUIRED TO RECALL 3/3 HIP PRECAUTIONS AND TO APPLY PRECAUTIONS INTO SESSION  HE REMAINS APPROPIATE FOR ONGOING REHAB UPON D/C TO MAXIMIZE FUNCTIONAL MOBILITY AND CONDITIONING TO ALLOW FOR DECREASED RISK FOR FALLS     Barriers to Discharge Decreased caregiver support   Goals   Patient Goals TO GET BACK TOBED AND COMFORTABLE    STG Expiration Date 12/10/17   Treatment Day 6   Plan   Treatment/Interventions Functional transfer training;LE strengthening/ROM; Therapeutic exercise; Endurance training;Bed mobility;Gait training; Compensatory technique education   Progress Slow progress, medical status limitations   PT Frequency 5x/wk; Weekend  (1X/WEEKEND)   Recommendation   Recommendation (REHAB)   Equipment Recommended [facility] Fusi   PT - OK to Discharge ([address])     Jyoti Rod, PTA

## 2017-11-30 NOTE — OCCUPATIONAL THERAPY NOTE
11/30/17 5620   Restrictions/Precautions   Weight Bearing Precautions Per Order Yes   RLE Weight Bearing Per Order WBAT   Other Precautions Cognitive; Chair Alarm; Bed Alarm;THR;Fall Risk;Pain   Pain Assessment   Pain Assessment 0-10   Pain Score 7   ADL   Where Assessed Chair   Grooming Assistance 5  Supervision/Setup   Grooming Deficit Setup   Grooming Comments brush teeth and wash face   UB Bathing Assistance 5  Supervision/Setup   UB Bathing Deficit Setup; Increased time to complete   LB Bathing Assistance 3  Moderate Assistance   LB Bathing Deficit Setup;Supervision/safety;Verbal cueing; Increased time to complete;Right lower leg including foot; Left lower leg including foot; Buttocks   UB Dressing Assistance 4  Minimal Assistance   UB Dressing Deficit Setup   UB Dressing Comments gown only   LB Dressing Assistance 2  Maximal Assistance   LB Dressing Deficit Don/doff R sock; Don/doff L sock; Verbal cueing   Bed Mobility   Supine to Sit 2  Maximal assistance   Additional items Assist x 2;HOB elevated;Verbal cues; Impulsive;LE management   Transfers   Sit to Stand 3  Moderate assistance   Additional items Assist x 2; Increased time required;Verbal cues   Stand to Sit 3  Moderate assistance   Additional items Assist x 2;Impulsive;Verbal cues; Increased time required   Stand pivot 3  Moderate assistance   Additional items Assist x 2; Increased time required; Impulsive;Verbal cues   Toilet Transfers   Toilet Transfer From [employer] Transfer Type To   Toilet Transfer to Standard bedside commode   Toilet Transfer Technique Stand pivot   Toilet Transfers Moderate assistance  (Ax2)   Cognition   Overall Cognitive Status Impaired   Arousal/Participation Alert   Attention Attends with cues to redirect   Orientation Level Oriented X4   Memory Decreased recall of precautions   Following Commands Follows all commands and directions without difficulty   Activity Tolerance   Activity Tolerance Patient tolerated treatment well Assessment   Assessment Pt participates in OT session with focus on bathing, dressing, grooming, transfers, and activity tolerance to increase I for d/c  Pt max Ax2 supine to sit EOB with HOB elevated  Pt mod Ax2 stand pivot transfer to bedside commode with L knee block and HHA  Pt has difficulty pivoting with feet and exhibits impulsivity and requires vc for safety  Pt unable to have BM while on commode  Pt mod Ax2 stand pivot transfer to bedside chair with RW support and pt noted improvement in side stepping to chair  Pt mod A bathing with SB and requires A to wash buttocks and both feet 2* decreased ROM  Pt setup grooming to brush teeth and wash face while seated in bedside chair  Pt min A UB dressing to don hospital gown  Pt max A LB dressing to don socks  Pt exhibits significant left lateral lean with unsupported sitting and needs mod-max A to sit upright  Pt will continue to benefit from activity tolerance, transfers, and ROM  Plan   Treatment Interventions ADL retraining;Functional transfer training; Endurance training; Activityengagement   Goal Expiration Date 12/06/17   Treatment Day 3   OT Frequency 5x/wk   Recommendation   OT Discharge Recommendation Short Term Rehab

## 2017-11-30 NOTE — PROGRESS NOTES
I saw patient earlier today at 12:50 p m  Sherre Soulier Follow-up visit for DVT left upper arm and left arm is less swollen, CVA with left hemiparesis and improvement, hematoma right thigh post right hip surgery and tPA and patient on anticoagulation that is temporarily on hold now because of drop in hematocrit  Patient received 1 unit of blood  He did walk with physical therapist   Right thigh is swollen as before  He has some discomfort in right knee  Noted lab results and imaging studies  Condition and counts are being monitored  Orthopedist to re-evaluate the patient  Discussed with patient

## 2017-11-30 NOTE — CONSULTS
Please refer to previous ortho consult note from 11/24  No further orthopaedic intervention at this time    Refer patient to his Physician at [place] [id number]  for post-operative follow-up

## 2017-11-30 NOTE — PLAN OF CARE
Problem: OCCUPATIONAL THERAPY ADULT  Goal: Performs self-care activities at highest level of function for planned discharge setting  See evaluation for individualized goals  Treatment Interventions: ADL retraining, Functional transfer training, UE strengthening/ROM, Endurance training          See flowsheet documentation for full assessment, interventions and recommendations  Outcome: Progressing  Limitation: Decreased ADL status, Decreased UE strength, Decreased Safe judgement during ADL, Decreased endurance, Decreased fine motor control, Decreased self-care trans  Prognosis: Good  Assessment: Pt participates in OT session with focus on bathing, dressing, grooming, transfers, and activity tolerance to increase I for d/c  Pt max Ax2 supine to sit EOB with HOB elevated  Pt mod Ax2 stand pivot transfer to bedside commode with L knee block and HHA  Pt has difficulty pivoting with feet and exhibits impulsivity and requires vc for safety  Pt unable to have BM while on commode  Pt mod Ax2 stand pivot transfer to bedside chair with RW support and pt noted improvement in side stepping to chair  Pt mod A bathing with SB and requires A to wash buttocks and both feet 2* decreased ROM  Pt setup grooming to brush teeth and wash face while seated in bedside chair  Pt min A UB dressing to don hospital gown  Pt max A LB dressing to don socks  Pt exhibits significant left lateral lean with unsupported sitting and needs mod-max A to sit upright  Pt will continue to benefit from activity tolerance, transfers, and ROM  Recommendation: PT consult  OT Discharge Recommendation: Short Term Rehab  OT - OK to Discharge:  Yes

## 2017-11-30 NOTE — CONSULTS
Acute Care Surgery  Consultation  Talitha Burkitt 80 y o  male MRN: [id number]  Unit/Bed#: [facility] 705-01 Encounter: [id number]    Assessment and Plan:    62-year-old male with:     1  Right gluteal / thigh hematoma with acute blood loss anemia requiring transfusion after recent tPa administration for TIA/CVA on 11/24/2017               - Closely follow H&H; repeat now  - Transfuse p r n               - Compression wrap proximal right lower extremity  Also recommend placing abdominal binder around pelvis / buttocks / proximal thighs for additional compression               - Recommend holding holding ASA for 5-7 days provided he has no active bleeding  I discussed this with Dr Estefani Amaro and Kaylan  - Consider repeat CT scan of pelvis/right thigh with IV contrast if renal function appropriate and there is concern for ongoing bleeding  For now, just follow-up physical exam and labs  Above assessment and plan discussed with Dr Hattie Mariano  History of Present Illness   HPI:  Talitha Burkitt is a 80 y o  male who presented with left sided weakness and dysarthria  He was 4 days postoperative of explantation with reimplantation of right hip hardware for a periprosthetic fracture  His workup in the emergency department was negative for acute hemorrhagic stroke and he was administered tPA  After the initiation of the tPA, he developed a worsening headache and a repeat CT scan of his head was completed revealing no hemorrhage  He also developed worsening pain in his right hip and some tension in his right thigh which prompted a CT scan demonstrating a postoperative seroma versus hematoma  He did receive 1 unit of packed red blood cells over concern for active bleeding with appropriate response in his hemoglobin  He was admitted to the medical intensive care unit and has subsequently been transitioned to a medical-surgical floor    His hemoglobin had slowly trended down over the last several days until he had a significant drop from 7 6-6 5 yesterday  He received an additional transfusion of 1 unit of packed red blood cells with appropriate response of his hemoglobin to 8 4 today  He notes that subjectively his right hip and thigh pain has actually decreased over the last 2 days with his ability to be more mobile and active  He notes no worsening swelling or tension in his right thigh or buttock  He denies any dizziness, lightheadedness, or presyncopal symptoms  Consults    Review of Systems   Constitutional: Negative  Negative for activity change, appetite change, chills, fatigue and fever  HENT: Negative  Eyes: Negative  Respiratory: Negative  Negative for cough, chest tightness, shortness of breath and wheezing  Cardiovascular: Positive for leg swelling (Right buttock/hip/thigh swelling laterally with erick-incisional ecchymosis)  Negative for chest pain  Gastrointestinal: Positive for abdominal distention, abdominal pain and diarrhea  Negative for constipation, nausea and vomiting  Endocrine: Negative  Genitourinary: Negative  Negative for difficulty urinating, dysuria, flank pain and frequency  Musculoskeletal: Positive for arthralgias (Right hip and thigh pain, but decreased over the last 2 days  )  Skin: Positive for color change (Mild ecchymosis around his right hip and thigh incision ) and wound (Right hip and thigh incision is clean, dry, and intact with staples in place  )  Negative for pallor and rash  Allergic/Immunologic: Negative  Neurological: Negative  Negative for dizziness, syncope, weakness, light-headedness and headaches  Hematological: Negative  Psychiatric/Behavioral: Negative  Negative for agitation and confusion         Historical Information   Past Medical History:   Diagnosis Date    CAD (coronary artery disease)     DM2 [facility]abetes mellitus, type 2) [facility]     HLD (hyperlipidemia)     HTN (hypertension)      Past Surgical History:   Procedure Laterality Date    HIP HARDWARE REMOVAL      TOTAL HIP ARTHROPLASTY Right      Social History   History   Alcohol Use No     History   Drug Use No     History   Smoking Status    Never Smoker   Smokeless Tobacco    Never Used     Family History   Problem Relation Age of Onset    Family history unknown: Yes       Meds/Allergies   all current active meds have been reviewed and current meds:   Current Facility-Administered Medications   Medication Dose Route Frequency    acetaminophen (TYLENOL) tablet 975 mg  975 mg Oral Q8H [address]    amLODIPine (NORVASC) tablet 10 mg  10 mg Oral Daily    docusate sodium (COLACE) capsule 100 mg  100 mg Oral BID    doxazosin (CARDURA) tablet 4 mg  4 mg Oral Daily    folic acid-pyridoxine-cyanocobalamin 2 5-25-2 mg per tablet 1 tablet  1 tablet Oral Daily    insulin glargine (LANTUS) subcutaneous injection 10 Units  10 Units Subcutaneous HS    insulin lispro (HumaLOG) 100 units/mL subcutaneous injection 1-6 Units  1-6 Units Subcutaneous HS    insulin lispro (HumaLOG) 100 units/mL subcutaneous injection 2-12 Units  2-12 Units Subcutaneous TID AC    iohexol (OMNIPAQUE) 350 MG/ML injection (MULTI-DOSE) 85 mL  85 mL Intravenous Once in imaging    iron sucrose (VENOFER) 100 mg in sodium chloride 0 9 % 100 mL IVPB  100 mg Intravenous Daily    lactulose 20 g/30 mL oral solution 10 g  10 g Oral TID    lisinopril (ZESTRIL) tablet 40 mg  40 mg Oral Daily    metoprolol (LOPRESSOR) injection 5 mg  5 mg Intravenous Q6H PRN    metoprolol tartrate (LOPRESSOR) tablet 25 mg  25 mg Oral Q12H SACHIN    ondansetron (ZOFRAN) injection 4 mg  4 mg Intravenous Q4H PRN    oxyCODONE (ROXICODONE) immediate release tablet 10 mg  10 mg Oral Q4H PRN    oxyCODONE (ROXICODONE) IR tablet 5 mg  5 mg Oral Q4H PRN    polyethylene glycol (MIRALAX) packet 17 g  17 g Oral Daily PRN    senna (SENOKOT) tablet 8 6 mg  1 tablet Oral HS     Allergies   Allergen Reactions    Celecoxib     Hydromorphone     Pravastatin Hives       Objective   First Vitals:   Blood Pressure: (!) 176/75 (11/24/17 1740)  Pulse: 98 (11/24/17 1740)  Temperature: (!) 97 3 °F (36 3 °C) (11/24/17 1720)  Temp Source: Oral (11/24/17 1720)  Respirations: (!) 52 (11/24/17 1740)  Height: 5' 10 5" (179 1 cm) (11/25/17 0745)  Weight - Scale: 106 kg (233 lb 4 oz) (11/24/17 1734)  SpO2: 98 % (11/24/17 1900)    Current Vitals:   Blood Pressure: 164/74 (11/30/17 1119)  Pulse: 69 (11/30/17 1119)  Temperature: 98 7 °F (37 1 °C) (11/30/17 1119)  Temp Source: Oral (11/30/17 1119)  Respirations: 18 (11/30/17 1119)  Height: 5' 10 5" (179 1 cm) (11/25/17 0745)  Weight - Scale: 106 kg (233 lb 4 oz) (11/25/17 0745)  SpO2: 96 % (11/30/17 1119)      Intake/Output Summary (Last 24 hours) at 11/30/17 1721  Last data filed at 11/30/17 1300   Gross per 24 hour   Intake              405 ml   Output              725 ml   Net             -320 ml       Invasive Devices     Peripheral Intravenous Line            Peripheral IV 11/28/17 Left Antecubital 2 days                Physical Exam   Constitutional: He is oriented to person, place, and time  He appears well-developed and well-nourished  No distress  HENT:   Head: Normocephalic and atraumatic  Eyes: EOM are normal  Pupils are equal, round, and reactive to light  Neck: Normal range of motion  Neck supple  No tracheal deviation present  Cardiovascular: Normal rate, regular rhythm, normal heart sounds and intact distal pulses  Exam reveals no gallop and no friction rub  No murmur heard  Pulses:       Radial pulses are 2+ on the right side, and 2+ on the left side  Femoral pulses are 2+ on the right side, and 2+ on the left side  Popliteal pulses are 2+ on the right side, and 2+ on the left side  Pulmonary/Chest: Effort normal and breath sounds normal  No accessory muscle usage  No tachypnea  No respiratory distress  He has no decreased breath sounds  He has no wheezes  He has no rhonchi   He has no rales  Abdominal: Soft  Normal appearance and bowel sounds are normal  He exhibits distension (Mild distention and tympany)  He exhibits no mass  There is tenderness (Mild diffuse tenderness)  There is no rebound and no guarding  Musculoskeletal: He exhibits edema (Maria Elena-incisional edema and swelling of his right hip/thigh )  He exhibits no deformity  Right hip: He exhibits decreased range of motion, decreased strength, tenderness and swelling  Neurological: He is alert and oriented to person, place, and time  GCS eye subscore is 4  GCS verbal subscore is 5  GCS motor subscore is 6  Skin: Skin is warm, dry and intact  No rash noted  He is not diaphoretic  No erythema  No pallor  Psychiatric: He has a normal mood and affect  Nursing note and vitals reviewed  Wound Images:          Lab Results:   I have personally reviewed pertinent lab results  , CBC:   Lab Results   Component Value Date    WBC 11 53 (H) 11/30/2017    HGB 8 4 (L) 11/30/2017    HGB 8 4 (L) 11/30/2017    HCT 25 0 (L) 11/30/2017    HCT 24 9 (L) 11/30/2017    MCV 89 11/30/2017     11/30/2017    MCH 29 8 11/30/2017    MCHC 33 6 11/30/2017    RDW 14 6 11/30/2017    MPV 8 9 11/30/2017   , CMP: No results found for: NA, K, CL, CO2, ANIONGAP, BUN, CREATININE, GLUCOSE, CALCIUM, AST, ALT, ALKPHOS, PROT, ALBUMIN, BILITOT, EGFR, Coagulation: No results found for: PT, INR, APTT, Urinalysis: No results found for: COLORU, CLARITYU, SPECGRAV, PHUR, LEUKOCYTESUR, NITRITE, PROTEINUA, GLUCOSEU, KETONESU, BILIRUBINUR, BLOODU, Lipase: No results found for: LIPASE  Imaging: I have personally reviewed pertinent reports  and I have personally reviewed pertinent films in PACS  EKG, Pathology, and Other Studies: I have personally reviewed pertinent reports  and I have personally reviewed pertinent films in PACS    Counseling / Coordination of Care  Total floor / unit time spent today 45 minutes    Greater than 50% of total time was spent with the patient and / or family counseling and / or coordination of care      Janene Schmitt PA-C  11/30/2017 5:21 PM

## 2017-11-30 NOTE — PHYSICAL THERAPY NOTE
Physical Therapy Progress Note   11/30/17 0945   Pain Assessment   Pain Assessment 0-10   Pain Score 6   Pain Type Acute pain   Pain Location Hip   Pain Orientation Right   Pain Radiating Towards THIGH   Pain Descriptors Aching;Dull   Pain Frequency Constant/continuous   Pain Onset Ongoing   Clinical Progression Gradually improving   Effect of Pain on Daily Activities LIMITED DISTANCE   Patient's Stated Pain Goal 1   Hospital Pain Intervention(s) Repositioned; Ambulation/increased activity   Response to Interventions AGREEABLE TO TRANSITION TO STRETCHER FOR CT SCAN   Multiple Pain Sites No   Pain Rating: FLACC (Rest) - Face 1   Pain Rating: FLACC (Rest) - Legs 0   Pain Rating: FLACC (Rest) - Activity 0   Pain Rating: FLACC (Rest) - Cry 1   Pain Rating: FLACC (Rest) - Consolability 1   Score: FLACC (Rest) 3   Pain Rating: FLACC (Activity) - Face 1   Pain Rating: FLACC (Activity) - Legs 0   Pain Rating: FLACC (Activity) - Activity 0   Pain Rating: FLACC (Activity) - Cry 1   Pain Rating: FLACC (Activity) - Consolability 1   Score: FLACC (Activity) 3   Precautions   Total Hip Replacement (RIGHT POSTERION HIP REPLACEMENT)   Restrictions/Precautions   Weight Bearing Precautions Per Order Yes   RLE Weight Bearing Per Order WBAT   Other Precautions Chair Alarm; Bed Alarm;Cognitive; Fall Risk;Pain;WBS;THR   General   Chart Reviewed Yes   Response to Previous Treatment Patient with no complaints from previous session  Family/Caregiver Present No   Cognition   Overall Cognitive Status Impaired   Arousal/Participation Alert; Cooperative   Attention Attends with cues to redirect   Orientation Level Oriented X4   Memory Decreased recall of precautions   Following Commands Follows one step commands with increased time or repetition   Comments ADVISED PT  OF THERAPIST RETURN LATTER IN DAY TO COMPLETE SESSION  Subjective   Subjective THE PT  REPOETS EXPECTS STIFFNESS AND DISCOMFORT     Transfers   Sit to Stand 3  Moderate assistance   Additional items Assist x 2;Armrests; Increased time required;Verbal cues   Stand to Sit 3  Moderate assistance   Additional items Assist x 2; Increased time required;Verbal cues;HOB elevated  (TO STRETCHER FOR CT SCAN)   Ambulation/Elevation   Gait pattern Improper Weight shift; Poor UE support;Narrow RADHA; Decreased foot clearance;Decreased R stance; Foward flexed; Inconsistent ricardo   Gait Assistance 3  Moderate assist   Additional items Assist x 1;Assist x 2;Verbal cues; Tactile cues  (POSTURE,SEQUENCING RW WITH BLE'S  2ND FOR LINES )   Assistive Device Rolling walker   Distance 5'X1  (LIMITED BY TESTING )   Stair Management Assistance Not tested   Balance   Static Sitting Fair -  (CHAIR ALARM)   Static Standing Poor   Ambulatory Poor   Endurance Deficit   Endurance Deficit Yes   Endurance Deficit Description EXPECTED DISCOMFORT AND FATIGUE  Activity Tolerance   Activity Tolerance Patient limited by fatigue;Patient limited by pain;Treatment limited secondary to medical complications (Comment)   Nurse Made Aware SPOKE WITH WALE   Assessment   Prognosis Good   Problem List Decreased strength;Decreased endurance; Impaired balance;Decreased mobility; Decreased skin integrity;Orthopedic restrictions;Pain;Decreased safety awareness;Decreased cognition; Impaired judgement   Assessment LIMITED INTERVENTION PERFORMED THIS A M  AS BLOOD HANGING AND PT  AWAITING TRANSPORT FOR CT SCAN  OVERALL THE PT  CONTINUES TO EXHIBT ONGOING DECONDITIONING AND LIMITATIONS RELATED TO PAIN  D/C PLAN FOR REHAB REMAINS APPROPIATE  Barriers to Discharge Decreased caregiver support   Goals   Patient Goals TO GET STRONGER   STG Expiration Date 12/10/17   Treatment Day 5   Plan   Treatment/Interventions Functional transfer training; Endurance training;Gait training; Compensatory technique education   Progress Slow progress, medical status limitations   PT Frequency 5x/wk; Weekend  (1X/WEEKEND)   Recommendation   Recommendation (REHAB) Equipment Recommended Sandy Host   PT - OK to Discharge Yes  (Mono Westfall 75, PTA

## 2017-12-01 LAB
ABO GROUP BLD BPU: NORMAL
ANION GAP SERPL CALCULATED.3IONS-SCNC: 8 MMOL/L (ref 4–13)
APTT HEX PL PPP: 16 SEC (ref 0–11)
APTT SCREEN TO CONFIRM RATIO: 1.04 RATIO (ref 0–1.4)
APTT-LA IMM 4:1 NP PPP: 53 SEC (ref 0–48.9)
BPU ID: NORMAL
BUN SERPL-MCNC: 27 MG/DL (ref 5–25)
CALCIUM SERPL-MCNC: 8.8 MG/DL (ref 8.3–10.1)
CHLORIDE SERPL-SCNC: 102 MMOL/L (ref 100–108)
CO2 SERPL-SCNC: 25 MMOL/L (ref 21–32)
COMMENT: NORMAL
CONFIRM APTT/NORMAL: 45 SEC (ref 0–55)
CREAT SERPL-MCNC: 1.27 MG/DL (ref 0.6–1.3)
ERYTHROCYTE [DISTWIDTH] IN BLOOD BY AUTOMATED COUNT: 15.1 % (ref 11.6–15.1)
F5 GENE MUT ANL BLD/T: NORMAL
GFR SERPL CREATININE-BSD FRML MDRD: 53 ML/MIN/1.73SQ M
GLUCOSE SERPL-MCNC: 127 MG/DL (ref 65–140)
GLUCOSE SERPL-MCNC: 144 MG/DL (ref 65–140)
GLUCOSE SERPL-MCNC: 219 MG/DL (ref 65–140)
GLUCOSE SERPL-MCNC: 257 MG/DL (ref 65–140)
GLUCOSE SERPL-MCNC: 272 MG/DL (ref 65–140)
HCT VFR BLD AUTO: 22.4 % (ref 36.5–49.3)
HCT VFR BLD AUTO: 23.5 % (ref 36.5–49.3)
HGB BLD-MCNC: 7.5 G/DL (ref 12–17)
HGB BLD-MCNC: 7.9 G/DL (ref 12–17)
LA PPP-IMP: ABNORMAL
MCH RBC QN AUTO: 29.7 PG (ref 26.8–34.3)
MCHC RBC AUTO-ENTMCNC: 33.6 G/DL (ref 31.4–37.4)
MCV RBC AUTO: 88 FL (ref 82–98)
PLATELET # BLD AUTO: 361 THOUSANDS/UL (ref 149–390)
PMV BLD AUTO: 8.9 FL (ref 8.9–12.7)
POTASSIUM SERPL-SCNC: 4.4 MMOL/L (ref 3.5–5.3)
RBC # BLD AUTO: 2.66 MILLION/UL (ref 3.88–5.62)
SCREEN APTT: 59.8 SEC (ref 0–51.9)
SCREEN DRVVT: 41.5 SEC (ref 0–47)
SODIUM SERPL-SCNC: 135 MMOL/L (ref 136–145)
THROMBIN TIME: 16.8 SEC (ref 0–23)
UNIT DISPENSE STATUS: NORMAL
UNIT PRODUCT CODE: NORMAL
UNIT RH: NORMAL
WBC # BLD AUTO: 11.81 THOUSAND/UL (ref 4.31–10.16)

## 2017-12-01 PROCEDURE — 97110 THERAPEUTIC EXERCISES: CPT

## 2017-12-01 PROCEDURE — 80048 BASIC METABOLIC PNL TOTAL CA: CPT | Performed by: INTERNAL MEDICINE

## 2017-12-01 PROCEDURE — 85018 HEMOGLOBIN: CPT | Performed by: INTERNAL MEDICINE

## 2017-12-01 PROCEDURE — 85014 HEMATOCRIT: CPT | Performed by: INTERNAL MEDICINE

## 2017-12-01 PROCEDURE — 82948 REAGENT STRIP/BLOOD GLUCOSE: CPT

## 2017-12-01 PROCEDURE — 85027 COMPLETE CBC AUTOMATED: CPT | Performed by: INTERNAL MEDICINE

## 2017-12-01 PROCEDURE — 97116 GAIT TRAINING THERAPY: CPT

## 2017-12-01 RX ADMIN — FOLIC ACID-PYRIDOXINE-CYANOCOBALAMIN TAB 2.5-25-2 MG 1 TABLET: 2.5-25-2 TAB at 09:47

## 2017-12-01 RX ADMIN — DOXAZOSIN 4 MG: 4 TABLET ORAL at 09:47

## 2017-12-01 RX ADMIN — LISINOPRIL 40 MG: 20 TABLET ORAL at 09:47

## 2017-12-01 RX ADMIN — IRON SUCROSE 100 MG: 20 INJECTION, SOLUTION INTRAVENOUS at 10:32

## 2017-12-01 RX ADMIN — INSULIN LISPRO 2 UNITS: 100 INJECTION, SOLUTION INTRAVENOUS; SUBCUTANEOUS at 21:35

## 2017-12-01 RX ADMIN — ACETAMINOPHEN 975 MG: 325 TABLET, FILM COATED ORAL at 21:33

## 2017-12-01 RX ADMIN — ACETAMINOPHEN 975 MG: 325 TABLET, FILM COATED ORAL at 13:53

## 2017-12-01 RX ADMIN — INSULIN GLARGINE 10 UNITS: 100 INJECTION, SOLUTION SUBCUTANEOUS at 21:34

## 2017-12-01 RX ADMIN — INSULIN LISPRO 6 UNITS: 100 INJECTION, SOLUTION INTRAVENOUS; SUBCUTANEOUS at 17:29

## 2017-12-01 RX ADMIN — LACTULOSE 10 G: 20 SOLUTION ORAL at 21:32

## 2017-12-01 RX ADMIN — DOCUSATE SODIUM 100 MG: 100 CAPSULE, LIQUID FILLED ORAL at 09:48

## 2017-12-01 RX ADMIN — SENNOSIDES 8.6 MG: 8.6 TABLET, FILM COATED ORAL at 21:33

## 2017-12-01 RX ADMIN — INSULIN LISPRO 6 UNITS: 100 INJECTION, SOLUTION INTRAVENOUS; SUBCUTANEOUS at 11:41

## 2017-12-01 RX ADMIN — METOPROLOL TARTRATE 25 MG: 25 TABLET ORAL at 09:47

## 2017-12-01 RX ADMIN — METOPROLOL TARTRATE 25 MG: 25 TABLET ORAL at 21:33

## 2017-12-01 RX ADMIN — AMLODIPINE BESYLATE 10 MG: 10 TABLET ORAL at 09:47

## 2017-12-01 RX ADMIN — ACETAMINOPHEN 975 MG: 325 TABLET, FILM COATED ORAL at 05:01

## 2017-12-01 RX ADMIN — DOCUSATE SODIUM 100 MG: 100 CAPSULE, LIQUID FILLED ORAL at 17:29

## 2017-12-01 NOTE — OCCUPATIONAL THERAPY NOTE
Occupational Therapy Cancel Note:    Occupational therapy attempted however pt refused 2* pt fatigue  OT will attempt again next treatment and pt agreeable to same       Emma Greek

## 2017-12-01 NOTE — PROGRESS NOTES
Progress Note - General Surgery   Gabbi Balderas 80 y o  male MRN: [id number]  Unit/Bed#: [facility] 705-01 Encounter: [id number]    Assessment and Plan:  Patient Active Problem List   Diagnosis    Stroke ([facility] )    HTN (hypertension)    DM2 (diabetes mellitus, type 2) ([facility] )    HLD (hyperlipidemia)    CAD (coronary artery disease)    Postoperative anemia    Right hip pain    Acute deep vein thrombosis (DVT) of brachial vein of left upper extremity (HCC)    Coagulopathy (HCC)       Assessment:  81 y/o M with R lower extremity hematoma     Plan:  Continue to trend Hb   If possible have pt wear binder compression if possible   Continue to hold anticoagulation         Subjective: No acute complaints  Hb stable  Tmax 100 2 Tc 99 2    Objective:     Vitals   Vitals:    11/30/17 1947 11/30/17 2155 12/01/17 0401 12/01/17 0754   BP: 155/70 156/75 (!) 179/80 167/69   Pulse: 81 85 74 79   Resp: 18  18 16   Temp: 100 2 °F (37 9 °C)  99 2 °F (37 3 °C) 98 2 °F (36 8 °C)   TempSrc: Oral  Oral Oral   SpO2: 95%  97% 96%   Weight:       Height:         Temp  Min: 96 °F (35 6 °C)  Max: 100 6 °F (38 1 °C)  IBW: 74 15 kg   Body mass index is 32 99 kg/m²  I/O   I/O       11/29 0701 - 11/30 0700 11/30 0701 - 12/01 0700 12/01 0701 - 12/02 0700    P  O  960 660 460    IV Piggyback  105     Total Intake(mL/kg) 960 (9 1) 765 (7 2) 460 (4 3)    Urine (mL/kg/hr) 1375 (0 5) 810 (0 3)     Stool  0 (0)     Total Output 1375 810      Net -415 -45 +460           Unmeasured Stool Occurrence  4 x           Intake/Output Summary (Last 24 hours) at 12/01/17 0856  Last data filed at 12/01/17 0801   Gross per 24 hour   Intake              925 ml   Output              810 ml   Net              115 ml       Invasive  Devices  Invasive Devices     Peripheral Intravenous Line            Peripheral IV 11/28/17 Left Antecubital 3 days                Active medications  Scheduled Meds:  acetaminophen 975 mg Oral Q8H [facility]   amLODIPine 10 mg Oral Daily docusate sodium 100 mg Oral BID   doxazosin 4 mg Oral Daily   folic acid-pyridoxine-cyanocobalamin 1 tablet Oral Daily   insulin glargine 10 Units Subcutaneous HS   insulin lispro 1-6 Units Subcutaneous HS   insulin lispro 2-12 Units Subcutaneous TID AC   iron sucrose 100 mg Intravenous Daily   lactulose 10 g Oral TID   lisinopril 40 mg Oral Daily   metoprolol tartrate 25 mg Oral Q12H [address]   senna 1 tablet Oral HS     Continuous Infusions:     PRN Meds:     iohexol 85 mL Once in imaging   metoprolol 5 mg Q6H PRN   ondansetron 4 mg Q4H PRN   oxyCODONE 10 mg Q4H PRN   oxyCODONE 5 mg Q4H PRN   polyethylene glycol 17 g Daily PRN       Physical Exam: /69   Pulse 79   Temp 98 2 °F (36 8 °C) (Oral)   Resp 16   Ht 5' 10 5" (1 791 m)   Wt 106 kg (233 lb 4 oz)   SpO2 96%   BMI 32 99 kg/m²     Physical Exam:  General Appearance: Alert, cooperative, no distress, appears stated age  Lungs: Clear to auscultation bilaterally, respirations unlablored   Heart: Regular rate and rhythm, S1 and S2 normal, no murmur, rub or gallop  Abdomen: Soft, non-tender, non distended, bowel sounds active in all four quadrants,   No masses or organomegaly  R leg Swelling is present with erick incisional ecchymosis Hip & thigh incision is CDI Staples in place Some tenderness exhibited during physical exam     Laboratory and Diagnostics:    Results from last 7 days  Lab Units 12/01/17  0512 11/30/17  1815 11/30/17  1238  11/29/17  2049  11/28/17  0540  11/26/17  0411  11/25/17  0428   WBC Thousand/uL 11 81*  --  11 53*  --  7 39  < > 7 86  < > 8 18  --  8 65   HEMOGLOBIN g/dL 7 9* 7 8* 8 4*  8 4*  < > 6 9*  < > 7 6*  < > 8 0*  < > 8 9*   HEMATOCRIT % 23 5* 23 5* 25 0*  24 9*  < > 20 6*  < > 22 5*  < > 23 1*  < > 24 8*   PLATELETS Thousands/uL 361  --  335  --  276  < > 243  < > 156  --  134*   NEUTROS PCT %  --   --   --   --   --   --  69  --  69  --  76*   MONOS PCT %  --   --   --   --   --   --  8  --  12  --  10   < > = values in this interval not displayed  Results from last 7 days  Lab Units 12/01/17  0512 11/30/17  1815 11/28/17  0540  11/25/17  0428   SODIUM mmol/L 135* 135* 135*  < > 135*   POTASSIUM mmol/L 4 4 4 5 4 6  < > 4 3   CHLORIDE mmol/L 102 101 103  < > 103   CO2 mmol/L 25 25 26  < > 25   BUN mg/dL 27* 30* 36*  < > 35*   CREATININE mg/dL 1 27 1 49* 1 41*  < > 1 50*   CALCIUM mg/dL 8 8 8 3 8 1*  < > 7 8*   TOTAL PROTEIN g/dL  --   --   --   --  5 4*   BILIRUBIN TOTAL mg/dL  --   --   --   --  1 27*   ALK PHOS U/L  --   --   --   --  33*   ALT U/L  --   --   --   --  13   AST U/L  --   --   --   --  26   GLUCOSE RANDOM mg/dL 127 258* 159*  < > 172*   < > = values in this interval not displayed      Results from last 7 days  Lab Units 11/28/17  0540 11/25/17  0428   MAGNESIUM mg/dL 2 2 2 1     Lab Results   Component Value Date    PHOS 3 2 11/28/2017    PHOS 2 9 11/25/2017    PHOS 3 8 01/18/2017        Results from last 7 days  Lab Units 11/24/17  1519 11/24/17  1009   INR  1 09 1 07   PTT seconds 31 44*     No results found for: TROPONINT  ABG:No results found for: PHART, KLH8XVL, PO2ART, MNI3QSY, P9HOEVAY, BEART, SOURCE    Blood Culture: No results found for: BLOODCX  Urine Culture: No results found for: URINECX  Sputum Culture: No components found for: SPUTUMCX    Imaging: No new imaging    VTE Pharmacologic Prophylaxis: none / held   VTE Mechanical Prophylaxis: Venodynes

## 2017-12-01 NOTE — PLAN OF CARE
Problem: PHYSICAL THERAPY ADULT  Goal: Performs mobility at highest level of function for planned discharge setting  See evaluation for individualized goals  Treatment/Interventions: Functional transfer training, LE strengthening/ROM, Therapeutic exercise, Endurance training, Patient/family training, Bed mobility, Gait training, Equipment eval/education  Equipment Recommended: Fransico Terry       See flowsheet documentation for full assessment, interventions and recommendations  Outcome: Progressing  Prognosis: Good  Problem List: Decreased strength, Decreased range of motion, Decreased endurance, Impaired balance, Decreased mobility, Orthopedic restrictions, Decreased safety awareness, Decreased cognition, Impaired judgement  Assessment: CURRENT ATTEMPTS AT PROGRESSIVE MOBILITY LIMITED BY 2 EPISODES OF LIQUID STOOL INCONTINENCE WITH ATTEMPTS AT PROGRESSIVE MOBILIZATION  ONGOING DIFFICULTY MOTOR PLANING AND EFFECTIVELY WEIGHT SHIFTING REMAINS DIFFICULT  CONTINUED ASSISTANCE IN THE FORM OF VERBAL CUEING FOR HIP PRECAUTIONS  THE CURRENT RECCOMENDATION FOR ONGOING REHAB UPON D/C REMAINS VALID  Barriers to Discharge: Decreased caregiver support     Recommendation:  (REHAB)     PT - OK to Discharge: (S) Yes ([address] [place] [address] )    See flowsheet documentation for full assessment     Nicci Matthews, PTA

## 2017-12-01 NOTE — PHYSICAL THERAPY NOTE
Physical Therapy Progress Note   12/01/17 1004   Pain Assessment   Pain Assessment 0-10   Pain Score 4   Pain Type Acute pain   Pain Location Hip   Pain Orientation Right   Pain Radiating Towards THIGH   Pain Descriptors Aching;Dull;Tightness  (PT  REPORTS RIGHT THIGH ACE WRAP TO TIGHT )   Pain Frequency Constant/continuous   Pain Onset Ongoing   Clinical Progression Not changed   Effect of Pain on Daily Activities ONGOING DIFFICULTY WITH PERFORMANCE OF TRANSFERS  Patient's Stated Pain Goal No pain   Hospital Pain Intervention(s) Repositioned; Ambulation/increased activity   Response to Interventions IMPROVING UPRIGHT MOBILITY  Multiple Pain Sites No   Pain Rating: FLACC (Rest) - Face 1   Pain Rating: FLACC (Rest) - Legs 0   Pain Rating: FLACC (Rest) - Activity 0   Pain Rating: FLACC (Rest) - Cry 1   Pain Rating: FLACC (Rest) - Consolability 1   Score: FLACC (Rest) 3   Pain Rating: FLACC (Activity) - Face 1   Pain Rating: FLACC (Activity) - Legs 0   Pain Rating: FLACC (Activity) - Activity 0   Pain Rating: FLACC (Activity) - Cry 1   Pain Rating: FLACC (Activity) - Consolability 1   Score: FLACC (Activity) 3   Precautions   Total Hip Replacement (RIGHT POSTERIOR HIP REPLACEMENT)   Restrictions/Precautions   Weight Bearing Precautions Per Order Yes   RLE Weight Bearing Per Order WBAT   Other Precautions Bed Alarm; Chair Alarm;Pain; Fall Risk;THR;WBS   General   Chart Reviewed Yes   Response to Previous Treatment Patient with no complaints from previous session  Family/Caregiver Present No   Cognition   Overall Cognitive Status Impaired   Arousal/Participation Alert; Cooperative   Attention Attends with cues to redirect   Orientation Level Oriented X4   Memory Decreased recall of precautions   Following Commands Follows one step commands with increased time or repetition   Transfers   Sit to Stand 3  Moderate assistance   Additional items Assist x 2; Increased time required;Verbal cues;Armrests  (FACILITATION OF TRUNK)   Stand to Sit 3  Moderate assistance   Additional items Assist x 2; Increased time required;Verbal cues;Armrests  (ASSISTANCE FOR HAND PLACEMENT/TRUNK FACILITATION )   Toilet transfer 3  Moderate assistance   Additional items Assist x 1; Armrests; Verbal cues; Increased time required;Commode   Ambulation/Elevation   Gait pattern Improper Weight shift; Poor UE support; Forward Flexion; Inconsistent ricardo; Excessively slow;Decreased R stance   Gait Assistance 3  Moderate assist   Additional items Assist x 2;Verbal cues; Tactile cues  (POSTURE/PACING FOR TRANSFER TO COMMODE )   Assistive Device Rolling walker   Distance 10'X2 12'X2  (10' TO COMMODE 12' LIMITED BY LIQUID STOOL INCONTINENCE )   Stair Management Assistance Not tested   Wheelchair Activities   Wheelchair Cushion Pressure relieving  (SOFT CARE CUSHION )   Balance   Static Sitting Fair -  (CHAIR ALARM ACTIVATED )   Dynamic Sitting Poor +   Static Standing Poor  (UTILIZATION OF ROLLER WALKER )   Ambulatory Poor   Endurance Deficit   Endurance Deficit Yes   Endurance Deficit Description CONTINUED DECONDITIONING  Activity Tolerance   Activity Tolerance Treatment limited secondary to medical complications (Comment); Patient limited by fatigue;Patient limited by pain   Medical Staff Made Aware SPOKE WITH Tanya Dewitt FROM CASE MANAGEMENT   Nurse Made Aware SPOKE WITH LORNA Jordan   THR Sitting;15 reps;AAROM; Right   Assessment   Prognosis Good   Problem List Decreased strength;Decreased range of motion;Decreased endurance; Impaired balance;Decreased mobility;Orthopedic restrictions;Decreased safety awareness;Decreased cognition; Impaired judgement   Assessment CURRENT ATTEMPTS AT PROGRESSIVE MOBILITY LIMITED BY 2 EPISODES OF LIQUID STOOL INCONTINENCE WITH ATTEMPTS AT PROGRESSIVE MOBILIZATION  ONGOING DIFFICULTY MOTOR PLANING AND EFFECTIVELY WEIGHT SHIFTING REMAINS DIFFICULT  CONTINUED ASSISTANCE IN THE FORM OF VERBAL CUEING FOR HIP PRECAUTIONS   THE CURRENT RECCOMENDATION FOR ONGOING REHAB UPON D/C REMAINS VALID  Barriers to Discharge Decreased caregiver support   Goals   Patient Goals TO DO BETTER   STG Expiration Date 12/10/17   Treatment Day 7   Plan   Treatment/Interventions Functional transfer training;LE strengthening/ROM; Therapeutic exercise; Endurance training;Gait training; Compensatory technique education   Progress Slow progress, medical status limitations   PT Frequency 5x/wk; Weekend  (1X/WEEKEND)   Recommendation   Recommendation (REHAB)   Equipment Recommended Berry Letters   PT - OK to Discharge Yes  (Mono Westfall 75, PTA

## 2017-12-01 NOTE — PROGRESS NOTES
Progress Note - Neurology   Michael Harris 80 y o  male MRN: [id number]  Unit/Bed#: [facility] 705-01 Encounter: [id number]    Assessment:  72-year-old right-handed male with history of hypertension, diabetes, hyperlipidemia, CAD with recent right hip revision on 11/20/2017 who presented to the ED with left upper extremity weakness, left facial droop and slurred speech  TPA was given, MRI was completed which revealed no acute disease  The patient was diagnosed with right hip hematoma status TPA  He also has a left upper extremity DVT  He was noted yesterday the patient had drop in his hemoglobin, general surgery was consulted and the patient was transfused 1 unit of packed red cells, antiplatelet was held  Plan:  -  Hold antiplatelet and anticoagulation at this time  -  Continued secondary risk factor modification  -  Continues supportive care, trending hemoglobin  - heme, surgery, and IM following closely  -  Notify neurology with any questions or concerns, neurological examination is stable     Subjective:   Reviewed prior notes, yesterday drop in hemoglobin requiring transfusion  He reports pain in his right hip/leg region  He denies any other complaints  ROS:  No cp, sob, palp, nausea or vomiting, no ha, dizziness, confusion, problems with swallowing,  + edema and weakness on the left lower extremity proximal greater than distal   No reported paraesthesias or dysesthesias  Vitals: Blood pressure 167/69, pulse 79, temperature 98 2 °F (36 8 °C), temperature source Oral, resp  rate 16, height 5' 10 5" (1 791 m), weight 106 kg (233 lb 4 oz), SpO2 96 %  ,Body mass index is 32 99 kg/m²         acetaminophen 975 mg Oral Q8H [facility]   amLODIPine 10 mg Oral Daily   docusate sodium 100 mg Oral BID   doxazosin 4 mg Oral Daily   folic acid-pyridoxine-cyanocobalamin 1 tablet Oral Daily   insulin glargine 10 Units Subcutaneous HS   insulin lispro 1-6 Units Subcutaneous HS   insulin lispro 2-12 Units Subcutaneous TID AC iron sucrose 100 mg Intravenous Daily   lactulose 10 g Oral TID   lisinopril 40 mg Oral Daily   metoprolol tartrate 25 mg Oral Q12H SACHIN   senna 1 tablet Oral HS         iohexol    metoprolol    ondansetron    oxyCODONE    oxyCODONE    polyethylene glycol    Physical Exam:   Physical Exam   Constitutional: He appears well-developed and well-nourished  No distress  HENT:   Head: Normocephalic and atraumatic  Eyes: EOM are normal  Pupils are equal, round, and reactive to light  Right eye exhibits no discharge  Left eye exhibits no discharge  Neck: Normal range of motion  Neck supple  Cardiovascular: Normal rate and regular rhythm  No murmur heard  Pulmonary/Chest: Effort normal and breath sounds normal  No respiratory distress  Abdominal: Soft  Musculoskeletal: He exhibits edema, tenderness and deformity  Bandage of the right proximal leg with edema, limited rom and pain   Skin: He is not diaphoretic  Psychiatric: He has a normal mood and affect  Vitals reviewed  Neurologic Exam     Cranial Nerves     CN III, IV, VI   Pupils are equal, round, and reactive to light  Extraocular motions are normal      Mental Status: The patient was awake, alert, attentive, oriented to person, place, and time  Recent and remote memory intact to conversation with no evidence of language dysfunction  Satisfactory fundamentals of knowledge  Normal attention span and concentration  Cranial Nerves:     I:  Smell not tested     II:  Visual fields full to confrontation  Pupils equal, round, reactive to light with  normal accomodation  III,IV,VI: Extraocular muscles EOMI, no nystagmus     V:  Masseter and pterygoid strength normal  Sensation in the V1 through V3  distributions intact to light touch bilaterally  VII:  Face is symmetric with no weakness noted  VIII: Audition intact to finger rub bilaterally  IX,X:  Uvula midline  Soft palate elevation symmetric       XI:  Trapezius and SCM strength 5/5 bilaterally  XII:  Tongue midline with no atrophy or fasciculations with appropriate movement  Motor Examination:   Bulk: Normal  No atrophy  Tone: Normal   Fasciculations: None  5/5 in uppers with no drift  Lowers - rigtht full strength, let hip flexion extension 1-2/5, limited knee flexion /extensino, plantars intact     Reflexes:          Biceps     Brachioradialis Triceps Patella     Achilles Plantars   Right  2+            2+   2+  2+     2+  Down  Left 2+     2+   2+  2+     2+  Down      Coordination: Patient able to perform normal finger-to-nose and heel to shin appropriately  Normal rapid alternating movements  Sensory: Normal sensation to light touch, pin prick and vibratory sensation throughout uppers, in lowers - left decreased to vibratory sense, bilateral lowers intact to touch    Gait: Not tested      Lab, Imaging and other studies:   I have personally reviewed pertinent reports    , CBC:   Results from last 7 days  Lab Units 12/01/17  0512 11/30/17  1815 11/30/17  1238  11/29/17  2049   WBC Thousand/uL 11 81*  --  11 53*  --  7 39   RBC Million/uL 2 66*  --  2 82*  --  2 34*   HEMOGLOBIN g/dL 7 9* 7 8* 8 4*  8 4*  < > 6 9*   HEMATOCRIT % 23 5* 23 5* 25 0*  24 9*  < > 20 6*   MCV fL 88  --  89  --  88   PLATELETS Thousands/uL 361  --  335  --  276   < > = values in this interval not displayed , BMP/CMP:   Results from last 7 days  Lab Units 12/01/17  0512 11/30/17  1815 11/28/17  0540  11/25/17  0428   SODIUM mmol/L 135* 135* 135*  < > 135*   POTASSIUM mmol/L 4 4 4 5 4 6  < > 4 3   CHLORIDE mmol/L 102 101 103  < > 103   CO2 mmol/L 25 25 26  < > 25   ANION GAP mmol/L 8 9 6  < > 7   BUN mg/dL 27* 30* 36*  < > 35*   CREATININE mg/dL 1 27 1 49* 1 41*  < > 1 50*   GLUCOSE RANDOM mg/dL 127 258* 159*  < > 172*   CALCIUM mg/dL 8 8 8 3 8 1*  < > 7 8*   AST U/L  --   --   --   --  26   ALT U/L  --   --   --   --  13   ALK PHOS U/L  --   --   --   --  33*   TOTAL PROTEIN g/dL  -- --   --   --  5 4*   ALBUMIN g/dL  --   --   --   --  2 1*   BILIRUBIN TOTAL mg/dL  --   --   --   --  1 27*   EGFR ml/min/1 73sq m 53 44 47  < > 44   < > = values in this interval not displayed  , Vitamin B12:   , HgBA1C:   , Lipid Profile:   Results from last 7 days  Lab Units 11/25/17  0428   HDL mg/dL 28*   CHOLESTEROL mg/dL 92   LDL CALC mg/dL 43   TRIGLYCERIDES mg/dL 107     VTE Prophylaxis: Sequential compression device (Venodyne)     Counseling / Coordination of Care  Total time spent today 20 minutes  Greater than 50% of total time was spent with the patient and / or family counseling and / or coordination of care   A description of the counseling / coordination of care: floor time, reviewing records, reviewing testing, examing patient ( new to [facility])

## 2017-12-01 NOTE — PROGRESS NOTES
[facility] Progress Note  Patient: Gabbi Balderas 80 y o  male   MRN: [id number]  PCP: Douglas Hoang DO  [facility] Encounter: [id number]  Date Of Visit: 12/01/17      Assessment and Plan:     ABLA 2/2 right hip hematoma:   Hemoglobin 8 4 after transfusion yesterday, dropped to 7 9 this morning  Will monitor H/H closely  Will stop Lovenox and aspirin for now  CT scan of the hip and right thigh showing possibly worsening hematoma  General surgery consulted and they are following  Currently the plan is to monitor H&H  If still further drops will get a CT of the pelvis and right thigh with contrast     TIA s/p TPA on 11/24:   appreciate neuro recs     Aspirin also  Yesterday because of the drop in hemoglobin and worsening hematoma  Not on statin 2/2 allergy      Acute LUE DVT:   Anticoagulation stopped because of drop in hemoglobin and worsening hematoma  Continue to monitor  If any signs of worsening will repeat ultrasound        CAD s/p PCI x 2, last stent 2/2016:   EKG showed ST elevations, but these appear to be present in prior EKGs     Trops neg x 3     Plavix discontinued and aspirin increased to full dose      S/p R hip revision MAREN 11/20:   per ortho, WBAT   This appears stable   Pain control with and tylenol rtc  Patient did not tolerate oxycodone well    So it was discontinued     CKD3: Cr 1 2 now     HTN: Restarted pt's home meds including ACEi and Cardura   C/w BB and Norvasc              VTE Pharmacologic Prophylaxis:   Pharmacologic: No AC because of hematoma  Mechanical VTE Prophylaxis in Place: Yes     Patient Centered Rounds: I have performed bedside rounds with nursing staff today      Discussions with Specialists or Other Care Team Provider: neuro and heme, gen surgery     Education and Discussions with Family / Patient:  Called the son and informed him      Time Spent for Care: 30 minutes   More than 50% of total time spent on counseling and coordination of care as described above      Current Length of Stay: 7 day(s)     Current Patient Status: Inpatient   Certification Statement: The patient will continue to require additional inpatient hospital stay due to need to monitor for bleeding     Discharge Plan / Estimated Discharge Date:  possibly tomorrow if hemoglobin stable     Code Status: Level 1 - Full Code        Subjective:   Pt seen and examined by me this morning  Pt say that he is feeling better  The pain in the right hip is still the same as yesterday but it has not worsened  Objective:     Vitals:   Temp (24hrs), Av 2 °F (37 3 °C), Min:98 2 °F (36 8 °C), Max:100 2 °F (37 9 °C)    HR:  [70-85] 70  Resp:  [16-18] 16  BP: (145-179)/(64-80) 145/64  SpO2:  [94 %-97 %] 94 %  Body mass index is 32 99 kg/m²  Input and Output Summary (last 24 hours): Intake/Output Summary (Last 24 hours) at 17 1222  Last data filed at 17 1200   Gross per 24 hour   Intake             1045 ml   Output              910 ml   Net              135 ml       Physical Exam:     Physical Exam    Constitutional: Pt appears well-developed and well-nourished   morbidly obese  Not in any acute distress  Cardiovascular: Normal rate, regular rhythm, normal heart sounds and intact distal pulses   Exam reveals no gallop and no friction rub   No murmur heard  Pulmonary/Chest: Effort normal and breath sounds normal  No respiratory distress  Pt has no wheezes or rales  Abdominal: Soft   Nondistended nontender Bowel sounds are normal    Musculoskeletal:  Decreased range of motion right lower extremity  Tenderness to palpation on right thigh laterally  Neurological: alert and oriented to person, place, and time  Normal strength and sensations  Psychiatric: normal mood and affect      Additional Data:     Labs:      Results from last 7 days  Lab Units 17  0512  17  0540   WBC Thousand/uL 11 81*  < > 7 86   HEMOGLOBIN g/dL 7 9*  < > 7 6*   HEMATOCRIT % 23 5*  < > 22 5*   PLATELETS Thousands/uL 361  < > 243   NEUTROS PCT %  --   --  69   LYMPHS PCT %  --   --  17   MONOS PCT %  --   --  8   EOS PCT %  --   --  5   < > = values in this interval not displayed  Results from last 7 days  Lab Units 12/01/17  0512  11/25/17  0428   SODIUM mmol/L 135*  < > 135*   POTASSIUM mmol/L 4 4  < > 4 3   CHLORIDE mmol/L 102  < > 103   CO2 mmol/L 25  < > 25   BUN mg/dL 27*  < > 35*   CREATININE mg/dL 1 27  < > 1 50*   CALCIUM mg/dL 8 8  < > 7 8*   TOTAL PROTEIN g/dL  --   --  5 4*   BILIRUBIN TOTAL mg/dL  --   --  1 27*   ALK PHOS U/L  --   --  33*   ALT U/L  --   --  13   AST U/L  --   --  26   GLUCOSE RANDOM mg/dL 127  < > 172*   < > = values in this interval not displayed  Results from last 7 days  Lab Units 11/24/17  1519   INR  1 09       * I Have Reviewed All Lab Data Listed Above  * Additional Pertinent Lab Tests Reviewed: [address] Admission Reviewed    Imaging:    Imaging Reports Reviewed Today Include:   Imaging Personally Reviewed by Myself Includes:      Recent Cultures (last 7 days):       Results from last 7 days  Lab Units 11/24/17  1429   GRAM STAIN RESULT  No Polys or Bacteria seen   WOUND CULTURE  No growth       Last 24 Hours Medication List:     acetaminophen 975 mg Oral Q8H [facility]   amLODIPine 10 mg Oral Daily   docusate sodium 100 mg Oral BID   doxazosin 4 mg Oral Daily   folic acid-pyridoxine-cyanocobalamin 1 tablet Oral Daily   insulin glargine 10 Units Subcutaneous HS   insulin lispro 1-6 Units Subcutaneous HS   insulin lispro 2-12 Units Subcutaneous TID AC   iron sucrose 100 mg Intravenous Daily   lactulose 10 g Oral TID   lisinopril 40 mg Oral Daily   metoprolol tartrate 25 mg Oral Q12H SACHIN   senna 1 tablet Oral HS        Today, Patient Was Seen By: Mireille Thakkar MD    ** Please Note: This note has been constructed using a voice recognition system   **

## 2017-12-02 LAB
GLUCOSE SERPL-MCNC: 128 MG/DL (ref 65–140)
GLUCOSE SERPL-MCNC: 140 MG/DL (ref 65–140)
GLUCOSE SERPL-MCNC: 213 MG/DL (ref 65–140)
GLUCOSE SERPL-MCNC: 222 MG/DL (ref 65–140)
HCT VFR BLD AUTO: 22.1 % (ref 36.5–49.3)
HCT VFR BLD AUTO: 22.7 % (ref 36.5–49.3)
HCT VFR BLD AUTO: 22.9 % (ref 36.5–49.3)
HGB BLD-MCNC: 7.3 G/DL (ref 12–17)
HGB BLD-MCNC: 7.5 G/DL (ref 12–17)
HGB BLD-MCNC: 7.5 G/DL (ref 12–17)

## 2017-12-02 PROCEDURE — 85018 HEMOGLOBIN: CPT | Performed by: INTERNAL MEDICINE

## 2017-12-02 PROCEDURE — 85014 HEMATOCRIT: CPT | Performed by: INTERNAL MEDICINE

## 2017-12-02 PROCEDURE — 82948 REAGENT STRIP/BLOOD GLUCOSE: CPT

## 2017-12-02 RX ADMIN — INSULIN LISPRO 4 UNITS: 100 INJECTION, SOLUTION INTRAVENOUS; SUBCUTANEOUS at 11:28

## 2017-12-02 RX ADMIN — ACETAMINOPHEN 975 MG: 325 TABLET, FILM COATED ORAL at 17:13

## 2017-12-02 RX ADMIN — ACETAMINOPHEN 975 MG: 325 TABLET, FILM COATED ORAL at 08:18

## 2017-12-02 RX ADMIN — AMLODIPINE BESYLATE 10 MG: 10 TABLET ORAL at 08:18

## 2017-12-02 RX ADMIN — ACETAMINOPHEN 975 MG: 325 TABLET, FILM COATED ORAL at 21:29

## 2017-12-02 RX ADMIN — INSULIN GLARGINE 10 UNITS: 100 INJECTION, SOLUTION SUBCUTANEOUS at 21:29

## 2017-12-02 RX ADMIN — METOPROLOL TARTRATE 25 MG: 25 TABLET ORAL at 08:18

## 2017-12-02 RX ADMIN — IRON SUCROSE 100 MG: 20 INJECTION, SOLUTION INTRAVENOUS at 08:32

## 2017-12-02 RX ADMIN — LISINOPRIL 40 MG: 20 TABLET ORAL at 08:18

## 2017-12-02 RX ADMIN — METOPROLOL TARTRATE 25 MG: 25 TABLET ORAL at 21:29

## 2017-12-02 RX ADMIN — INSULIN LISPRO 2 UNITS: 100 INJECTION, SOLUTION INTRAVENOUS; SUBCUTANEOUS at 21:29

## 2017-12-02 RX ADMIN — DOXAZOSIN 4 MG: 4 TABLET ORAL at 08:19

## 2017-12-02 RX ADMIN — FOLIC ACID-PYRIDOXINE-CYANOCOBALAMIN TAB 2.5-25-2 MG 1 TABLET: 2.5-25-2 TAB at 08:19

## 2017-12-02 NOTE — PROGRESS NOTES
[facility] Progress Note  Patient: Kervin Chi 80 y o  male   MRN: [id number]  PCP: Barrington Go DO  [facility] Encounter: [id number]  Date Of Visit: 12/02/17    Assessment and Plan:     ABLA 2/2 right hip hematoma:   Hemoglobin still slowly drifting down  Was 7 5 last night  This morning pending collection  Will monitor H/H closely  Hold Lovenox and aspirin for now  CT scan of the hip and right thigh showing possibly worsening hematoma  General surgery consulted and they are following  Currently the plan is to monitor H&H  If still further drops will get a CT of the pelvis and right thigh with contrast   Will recheck hemoglobin later today, if stable, will resume low-dose aspirin once cleared by surgery  If not then we will get the CT with contrast as above      TIA s/p TPA on 11/24:   appreciate neuro recs     Aspirin also stopped because of the drop in hemoglobin and worsening hematoma  Not on statin 2/2 allergy      Acute LUE DVT:   Anticoagulation stopped because of drop in hemoglobin and worsening hematoma  Continue to monitor  If any signs of worsening will repeat ultrasound        CAD s/p PCI x 2, last stent 2/2016:   EKG showed ST elevations, but these appear to be present in prior EKGs     Trops neg x 3     Plavix discontinued and aspirin increased to full dose      S/p R hip revision MAREN 11/20:   per ortho, WBAT   This appears stable   Pain control with and tylenol rtc   Patient did not tolerate oxycodone well   So it was discontinued     CKD3: Cr 1 2 now     HTN: Restarted pt's home meds including ACEi and Cardura   C/w BB and Norvasc              VTE Pharmacologic Prophylaxis:   Pharmacologic: No AC because of hematoma    Mechanical VTE Prophylaxis in Place: Yes     Patient Centered Rounds: I have performed bedside rounds with nursing staff today      Discussions with Specialists or Other Care Team Provider: neuro and gen surgery     Education and Discussions with Family / Patient:  Called the son and informed him      Time Spent for Care: 30 minutes   More than 50% of total time spent on counseling and coordination of care as described above      Current Length of Stay: 8 day(s)     Current Patient Status: Inpatient     Certification Statement: The patient will continue to require additional inpatient hospital stay due to need to monitor for bleeding     Discharge Plan / Estimated Discharge Date:  once Hb stabilizes and after resuming AC     Code Status: Level 1 - Full Code      Subjective:   Pt seen and examined by me this morning  Pt complained of  pain in the right hip which is pretty much the same as yesterday  Denies any other complaints  He is frustrated being in the hospital     Objective:     Vitals:   Temp (24hrs), Av 8 °F (37 1 °C), Min:98 2 °F (36 8 °C), Max:99 1 °F (37 3 °C)    HR:  [66-78] 74  Resp:  [16-18] 18  BP: (145-164)/(62-75) 159/75  SpO2:  [94 %-96 %] 94 %  Body mass index is 32 99 kg/m²  Input and Output Summary (last 24 hours): Intake/Output Summary (Last 24 hours) at 17 0843  Last data filed at 17 0739   Gross per 24 hour   Intake             1030 ml   Output              775 ml   Net              255 ml       Physical Exam:     Physical Exam    Constitutional: Pt appears well-developed and well-nourished   morbidly obese  Not in any acute distress  Cardiovascular: Normal rate, regular rhythm, normal heart sounds and intact distal pulses   Exam reveals no gallop and no friction rub   No murmur heard  Pulmonary/Chest: Effort normal and breath sounds normal  No respiratory distress  Pt has no wheezes or rales  Abdominal: Soft   Nondistended nontender Bowel sounds are normal    Musculoskeletal:  Decreased range of motion right lower extremity   Tenderness to palpation on right thigh laterally  Neurological: alert and oriented to person, place, and time  Normal strength and sensations    Psychiatric: normal mood and affect  Additional Data:     Labs:      Results from last 7 days  Lab Units 12/01/17  2030 12/01/17  0512  11/28/17  0540   WBC Thousand/uL  --  11 81*  < > 7 86   HEMOGLOBIN g/dL 7 5* 7 9*  < > 7 6*   HEMATOCRIT % 22 4* 23 5*  < > 22 5*   PLATELETS Thousands/uL  --  361  < > 243   NEUTROS PCT %  --   --   --  69   LYMPHS PCT %  --   --   --  17   MONOS PCT %  --   --   --  8   EOS PCT %  --   --   --  5   < > = values in this interval not displayed  Results from last 7 days  Lab Units 12/01/17  0512   SODIUM mmol/L 135*   POTASSIUM mmol/L 4 4   CHLORIDE mmol/L 102   CO2 mmol/L 25   BUN mg/dL 27*   CREATININE mg/dL 1 27   CALCIUM mg/dL 8 8   GLUCOSE RANDOM mg/dL 127           * I Have Reviewed All Lab Data Listed Above  * Additional Pertinent Lab Tests Reviewed: [address] Admission Reviewed    Imaging:    Imaging Reports Reviewed Today Include:   Imaging Personally Reviewed by Myself Includes:      Recent Cultures (last 7 days):           Last 24 Hours Medication List:     acetaminophen 975 mg Oral Q8H [address]   amLODIPine 10 mg Oral Daily   docusate sodium 100 mg Oral BID   doxazosin 4 mg Oral Daily   folic acid-pyridoxine-cyanocobalamin 1 tablet Oral Daily   insulin glargine 10 Units Subcutaneous HS   insulin lispro 1-6 Units Subcutaneous HS   insulin lispro 2-12 Units Subcutaneous TID AC   iron sucrose 100 mg Intravenous Daily   lactulose 10 g Oral TID   lisinopril 40 mg Oral Daily   metoprolol tartrate 25 mg Oral Q12H SACHIN   senna 1 tablet Oral HS        Today, Patient Was Seen By: Trevor Ortiz MD    ** Please Note: This note has been constructed using a voice recognition system   **

## 2017-12-03 LAB
GLUCOSE SERPL-MCNC: 128 MG/DL (ref 65–140)
GLUCOSE SERPL-MCNC: 181 MG/DL (ref 65–140)
GLUCOSE SERPL-MCNC: 203 MG/DL (ref 65–140)
GLUCOSE SERPL-MCNC: 218 MG/DL (ref 65–140)
HCT VFR BLD AUTO: 24.8 % (ref 36.5–49.3)
HCT VFR BLD AUTO: 25.1 % (ref 36.5–49.3)
HGB BLD-MCNC: 7.8 G/DL (ref 12–17)
HGB BLD-MCNC: 8.3 G/DL (ref 12–17)

## 2017-12-03 PROCEDURE — 85018 HEMOGLOBIN: CPT | Performed by: INTERNAL MEDICINE

## 2017-12-03 PROCEDURE — 85014 HEMATOCRIT: CPT | Performed by: INTERNAL MEDICINE

## 2017-12-03 PROCEDURE — 97530 THERAPEUTIC ACTIVITIES: CPT

## 2017-12-03 PROCEDURE — 82948 REAGENT STRIP/BLOOD GLUCOSE: CPT

## 2017-12-03 PROCEDURE — 97112 NEUROMUSCULAR REEDUCATION: CPT

## 2017-12-03 PROCEDURE — 97116 GAIT TRAINING THERAPY: CPT

## 2017-12-03 PROCEDURE — 97535 SELF CARE MNGMENT TRAINING: CPT

## 2017-12-03 RX ORDER — ASPIRIN 81 MG/1
81 TABLET, CHEWABLE ORAL DAILY
Status: DISCONTINUED | OUTPATIENT
Start: 2017-12-03 | End: 2017-12-05

## 2017-12-03 RX ORDER — POLYETHYLENE GLYCOL 3350 17 G/17G
17 POWDER, FOR SOLUTION ORAL DAILY
Status: DISCONTINUED | OUTPATIENT
Start: 2017-12-04 | End: 2017-12-05 | Stop reason: HOSPADM

## 2017-12-03 RX ADMIN — FOLIC ACID-PYRIDOXINE-CYANOCOBALAMIN TAB 2.5-25-2 MG 1 TABLET: 2.5-25-2 TAB at 08:31

## 2017-12-03 RX ADMIN — METOPROLOL TARTRATE 25 MG: 25 TABLET ORAL at 22:29

## 2017-12-03 RX ADMIN — ACETAMINOPHEN 975 MG: 325 TABLET, FILM COATED ORAL at 22:25

## 2017-12-03 RX ADMIN — ACETAMINOPHEN 975 MG: 325 TABLET, FILM COATED ORAL at 13:38

## 2017-12-03 RX ADMIN — LISINOPRIL 40 MG: 20 TABLET ORAL at 08:31

## 2017-12-03 RX ADMIN — IRON SUCROSE 100 MG: 20 INJECTION, SOLUTION INTRAVENOUS at 08:31

## 2017-12-03 RX ADMIN — INSULIN LISPRO 4 UNITS: 100 INJECTION, SOLUTION INTRAVENOUS; SUBCUTANEOUS at 12:44

## 2017-12-03 RX ADMIN — ASPIRIN 81 MG 81 MG: 81 TABLET ORAL at 08:44

## 2017-12-03 RX ADMIN — INSULIN GLARGINE 10 UNITS: 100 INJECTION, SOLUTION SUBCUTANEOUS at 22:27

## 2017-12-03 RX ADMIN — ACETAMINOPHEN 975 MG: 325 TABLET, FILM COATED ORAL at 08:32

## 2017-12-03 RX ADMIN — METOPROLOL TARTRATE 25 MG: 25 TABLET ORAL at 08:31

## 2017-12-03 RX ADMIN — INSULIN LISPRO 4 UNITS: 100 INJECTION, SOLUTION INTRAVENOUS; SUBCUTANEOUS at 17:04

## 2017-12-03 RX ADMIN — INSULIN LISPRO 1 UNITS: 100 INJECTION, SOLUTION INTRAVENOUS; SUBCUTANEOUS at 22:26

## 2017-12-03 RX ADMIN — DOXAZOSIN 4 MG: 4 TABLET ORAL at 08:31

## 2017-12-03 RX ADMIN — AMLODIPINE BESYLATE 10 MG: 10 TABLET ORAL at 08:31

## 2017-12-03 NOTE — PHYSICAL THERAPY NOTE
Physical Therapy Progress Note      12/03/17 0804   Pain Assessment   Pain Assessment 0-10   Pain Score 3   Pain Type Acute pain   Pain Location Leg   Pain Orientation Right   Hospital Pain Intervention(s) Ambulation/increased activity;Repositioned;Distraction; Emotional support   Response to Interventions Improving   Restrictions/Precautions   Weight Bearing Precautions Per Order Yes   RLE Weight Bearing Per Order WBAT   Other Precautions Bed Alarm; Chair Alarm;THR;Fall Risk;Pain   General   Chart Reviewed Yes   Response to Previous Treatment Patient with no complaints from previous session  Family/Caregiver Present No   Cognition   Overall Cognitive Status WFL   Arousal/Participation Alert; Cooperative   Comments Patient engages in appropriate conversation    Transfers   Sit to Stand 4  Minimal assistance   Additional items Assist x 1; Armrests; Increased time required;Verbal cues   Stand to Sit 4  Minimal assistance   Additional items Assist x 1; Increased time required;Armrests; Verbal cues   Stand pivot 4  Minimal assistance   Additional items Assist x 1; Increased time required;Verbal cues   Additional Comments Patient able to transfer with S at times, challenge continues to be the pain  Ambulation/Elevation   Gait pattern Improper Weight shift; Forward Flexion; Inconsistent ricardo; Excessively slow;Decreased R stance   Gait Assistance 4  Minimal assist   Additional items Assist x 1   Assistive Device Rolling walker   Distance 5 feet x 1, 20 feet x2   Balance   Static Sitting Good   Dynamic Sitting Fair +   Static Standing Fair   Dynamic Standing Fair   Ambulatory Fair   Endurance Deficit   Endurance Deficit Yes   Endurance Deficit Description Pain   Activity Tolerance   Activity Tolerance Patient tolerated treatment well;Patient limited by fatigue;Patient limited by pain   Nurse Made Aware Zandra BOWLES   Exercises   Hip Flexion Sitting;10 reps;Right   Hip Abduction Sitting;10 reps;Right   Hip Adduction Sitting;10 reps;Right   Knee AROM Long Arc Quad Sitting;10 reps;Right   Ankle Pumps Sitting;20 reps;Bilateral   Assessment   Prognosis Good   Problem List Decreased strength;Decreased range of motion;Decreased endurance; Impaired balance;Decreased mobility;Pain   Assessment Patient seated on bed side commode, agreeable to participate in therapy  He demonstrates improved mobility and is able to transfer with min A- S at times  He requires increase time for all mobility due to pain limitations and continued weakness, but overall demonstrates improved strength  He was able to stand x 6 minutes for clean up  Patient ambulated increase distances with cues for upright posture  He notes overall improvement  He performed seated TE with noted R LE weakness  He would continue to benefit from skilled PT to maximize functional independence  Barriers to Discharge Decreased caregiver support   Goals   Patient Goals To get better   STG Expiration Date 12/10/17   Treatment Day 8   Plan   Treatment/Interventions Functional transfer training;LE strengthening/ROM; Therapeutic exercise; Endurance training;Bed mobility;Gait training;Spoke to nursing   Progress Progressing toward goals   PT Frequency 5x/wk  (1x weekend)   Recommendation   Recommendation (rehab)   Equipment Recommended Marleny Cunningham   PT - OK to Discharge Yes  (to rehab when medically stable)       Jayy Shannon, PTA

## 2017-12-03 NOTE — PLAN OF CARE
Problem: OCCUPATIONAL THERAPY ADULT  Goal: Performs self-care activities at highest level of function for planned discharge setting  See evaluation for individualized goals  Treatment Interventions: ADL retraining, Functional transfer training, UE strengthening/ROM, Endurance training          See flowsheet documentation for full assessment, interventions and recommendations  Outcome: Progressing  Limitation: Decreased ADL status, Decreased UE strength, Decreased Safe judgement during ADL, Decreased endurance, Decreased fine motor control, Decreased self-care trans  Prognosis: Good  Assessment: JELENA Falcon approved OT treatment  Pt received semi supine in bed w/ bed alarm activated, RLE elevated - agreeable to OT session focusing on bed mob , functional transfers, [facility] education for LB dressing tasks, dynamic balance and THP review  Pt able to recall 2/3 THP - able to recall 3/3 w/ Mod prompting from [address]  Pt reported pain in R hip radiating to R thigh 6/10 - 8/10 following supine > sit transfer  Pt able to tolerate sitting EOB w/ support from bedrails ~30 minutes total for [facility] education  Pt noted w/ L lateral lean 2* pain - Mod-Max VCs to sit upright w/ poor follow through  Pt w/ fair carryover of AE use following demonstration to doff/don sock and pants - continue for consistency and appropriate carryover  Pt currently displaying poor functional standing balance requiring Min A when in stance for clothing management and urinal use  Refer to above for further details/comments regarding pt level of assist w/ functional tasks t/o session  Pt currently making fair progress w/ OT POC - will continue to benefit from skilled OT during acute care stay to address noted functional deficits and optimize occupational performance to return to PLOF  OT recommending continued STR at d/c to maximize safety and functional independence w/ ADLs and functional mob  RN informed of session/pt status and resumed care  Recommendation: PT consult  OT Discharge Recommendation: Short Term Rehab  OT - OK to Discharge: Yes    Memory QUETA Morales

## 2017-12-03 NOTE — PLAN OF CARE
Problem: PHYSICAL THERAPY ADULT  Goal: Performs mobility at highest level of function for planned discharge setting  See evaluation for individualized goals  Treatment/Interventions: Functional transfer training, LE strengthening/ROM, Therapeutic exercise, Endurance training, Patient/family training, Bed mobility, Gait training, Equipment eval/education  Equipment Recommended: Melissa Farnsworth       See flowsheet documentation for full assessment, interventions and recommendations  Outcome: Progressing  Prognosis: Good  Problem List: Decreased strength, Decreased range of motion, Decreased endurance, Impaired balance, Decreased mobility, Pain  Assessment: Patient seated on bed side commode, agreeable to participate in therapy  He demonstrates improved mobility and is able to transfer with min A- S at times  He requires increase time for all mobility due to pain limitations and continued weakness, but overall demonstrates improved strength  He was able to stand x 6 minutes for clean up  Patient ambulated increase distances with cues for upright posture  He notes overall improvement  He performed seated TE with noted R LE weakness  He would continue to benefit from skilled PT to maximize functional independence  Barriers to Discharge: Decreased caregiver support     Recommendation:  (rehab)     PT - OK to Discharge: Yes (to rehab when medically stable)    See flowsheet documentation for full assessment

## 2017-12-03 NOTE — PROGRESS NOTES
[facility] Internal Medicine Progress Note  Patient: Gagandeep Sprague 80 y o  male   MRN: [id number]  PCP: Albert Lennox, DO  [facility]counter: [id number]  Date Of Visit: 12/03/17    Assessment and Plan:     ABLA 2/2 right hip hematoma:   Hemoglobin stable at 7 5 last night  Pending this morning  Will monitor H/H closely  Will restart aspirin 81 mg as the patient is high risk for stroke  Hold Lovenox  General surgery following for worsening hematoma of the right hip and thigh  Currently the plan is to monitor H&H   If still further drops will get a CT of the pelvis and right thigh with contrast   Continue to monitor hemoglobin Q 12     TIA s/p TPA on 11/24:   appreciate neuro recs     Resume aspirin today  Not on statin 2/2 allergy      Acute LUE DVT:   Anticoagulation stopped because of drop in hemoglobin and worsening hematoma  Continue to monitor  If any signs of worsening will repeat ultrasound        CAD s/p PCI x 2, last stent 2/2016:   EKG showed ST elevations, but these appear to be present in prior EKGs     Trops neg x 3     Plavix discontinued and aspirin increased to full dose      S/p R hip revision MAREN 11/20:   per ortho, WBAT   This appears stable   Pain control with and tylenol rtc   Patient did not tolerate oxycodone well   So it was discontinued     CKD3: Cr stable for now      HTN: Restarted pt's home meds including ACEi and Cardura   C/w BB and Norvasc              VTE Pharmacologic Prophylaxis:   Pharmacologic: No AC because of hematoma     Mechanical VTE Prophylaxis in Place: Yes     Patient Centered Rounds: I have performed bedside rounds with nursing staff today      Discussions with Specialists or Other Care Team Provider: neuro and gen surgery     Education and Discussions with Family / Patient:  Called the son and informed him      Time Spent for Care: 30 minutes   More than 50% of total time spent on counseling and coordination of care as described above      Current Length of Stay: 9 day(s)     Current Patient Status: Inpatient      Certification Statement: The patient will continue to require additional inpatient hospital stay due to need to monitor for bleeding     Discharge Plan / Estimated Discharge Date:  once Hb stabilizes and after resuming AP     Code Status: Level 1 - Full Code        Subjective:   Pt seen and examined by me this morning  Pt states he is feeling much better  The pain in his hip is better than yesterday  Objective:     Vitals:   Temp (24hrs), Av 2 °F (37 3 °C), Min:98 5 °F (36 9 °C), Max:100 1 °F (37 8 °C)    HR:  [66-83] 75  Resp:  [16-18] 16  BP: (147-185)/(68-87) 167/80  SpO2:  [94 %-95 %] 95 %  Body mass index is 32 99 kg/m²  Input and Output Summary (last 24 hours): Intake/Output Summary (Last 24 hours) at 17 0827  Last data filed at 17 0601   Gross per 24 hour   Intake              915 ml   Output             2200 ml   Net            -1285 ml       Physical Exam:     Physical Exam    Constitutional: Pt appears well-developed and well-nourished   morbidly obese  Not in any acute distress  Cardiovascular: Normal rate, regular rhythm, normal heart sounds and intact distal pulses   Exam reveals no gallop and no friction rub   No murmur heard  Pulmonary/Chest: Effort normal and breath sounds normal  No respiratory distress  Pt has no wheezes or rales  Abdominal: Soft   Nondistended nontender Bowel sounds are normal    Musculoskeletal:  Decreased range of motion right lower extremity   Tenderness to palpation on right thigh laterally  Neurological: alert and oriented to person, place, and time  Normal strength and sensations  Psychiatric: normal mood and affect      Additional Data:     Labs:      Results from last 7 days  Lab Units 17  0512  17  0540   WBC Thousand/uL  --   --  11 81*  < > 7 86   HEMOGLOBIN g/dL 7 5*  < > 7 9*  < > 7 6*   HEMATOCRIT % 22 7*  < > 23 5*  < > 22 5*   PLATELETS Thousands/uL  --   --  361  < > 243   NEUTROS PCT %  --   --   --   --  69   LYMPHS PCT %  --   --   --   --  17   MONOS PCT %  --   --   --   --  8   EOS PCT %  --   --   --   --  5   < > = values in this interval not displayed  Results from last 7 days  Lab Units 12/01/17  0512   SODIUM mmol/L 135*   POTASSIUM mmol/L 4 4   CHLORIDE mmol/L 102   CO2 mmol/L 25   BUN mg/dL 27*   CREATININE mg/dL 1 27   CALCIUM mg/dL 8 8   GLUCOSE RANDOM mg/dL 127           * I Have Reviewed All Lab Data Listed Above  * Additional Pertinent Lab Tests Reviewed: [address] Admission Reviewed    Imaging:    Imaging Reports Reviewed Today Include:   Imaging Personally Reviewed by Myself Includes:      Recent Cultures (last 7 days):           Last 24 Hours Medication List:     acetaminophen 975 mg Oral Q8H [address]   amLODIPine 10 mg Oral Daily   aspirin 81 mg Oral Daily   docusate sodium 100 mg Oral BID   doxazosin 4 mg Oral Daily   folic acid-pyridoxine-cyanocobalamin 1 tablet Oral Daily   insulin glargine 10 Units Subcutaneous HS   insulin lispro 1-6 Units Subcutaneous HS   insulin lispro 2-12 Units Subcutaneous TID AC   iron sucrose 100 mg Intravenous Daily   lactulose 10 g Oral TID   lisinopril 40 mg Oral Daily   metoprolol tartrate 25 mg Oral Q12H SACHIN   senna 1 tablet Oral HS        Today, Patient Was Seen By: Pat Méndez MD    ** Please Note: This note has been constructed using a voice recognition system   **

## 2017-12-03 NOTE — OCCUPATIONAL THERAPY NOTE
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  Pain Assessment   Pain Assessment 0-10   Pain Score 8   Pain Type Acute pain   Pain Location Leg   Pain Orientation Right   Pain Radiating Towards (upper thigh)   Pain Frequency Constant/continuous  (worse when sitting over EOB)   ADL   Where Assessed Edge of bed   Equipment Provided Reacher;Sock aid;Dressing stick   LB Dressing Assistance 3  Moderate Assistance   LB Dressing Deficit Steadying;Setup;Verbal cueing; Increased time to complete;Supervision/safety; Don/doff R sock; Don/doff L sock; Thread RLE into pants; Thread LLE into pants;Pull up over hips;Use of adaptive equipment; Requires assistive device for steadying   LB Dressing Comments Increased time to complete LB dressing tasks w/ Mod-Max verbal and physical cueing to orient to proper use of equipment   Functional Standing Tolerance   Time 3 minutes   Activity supported standing for clothing management and to void w/ using urinal   Comments Pt required Min A when in stance for balance   Bed Mobility   Supine to Sit 2  Maximal assistance   Additional items HOB elevated; Bedrails; Increased time required;Verbal cues;LE management;Assist x 1   Sit to Supine 3  Moderate assistance   Additional items Assist x 1;Bedrails; Increased time required;Verbal cues;LE management   Additional Comments Pt able to scoot to [facility] using bedrails - left semi supine w/ RLE elevated and all needs in reach   Transfers   Sit to Stand 4  Minimal assistance   Additional items Assist x 1;Verbal cues  (VCs for safe technique) Stand to Sit 4  Minimal assistance   Additional items Assist x 1;Verbal cues   Additional Comments Pt requires Min VCs to maintain THP when performing functional transfers w/ F carryover of instruction   Toilet Transfers   Toilet Transfers (Ax2)   Cognition   Overall Cognitive Status [facility]   Arousal/Participation Alert; Cooperative   Attention Attends with cues to redirect   Orientation Level Oriented X4   Memory Decreased recall of precautions  (pt able to recall 2/3 THP - prompting to recall 3/3)   Following Commands Follows all commands and directions without difficulty   Activity Tolerance   Activity Tolerance Patient tolerated treatment well;Patient limited by pain   Medical Staff Made Aware RN approved OT treatment   Assessment   Assessment JELENA Stinson approved OT treatment  Pt received semi supine in bed w/ bed alarm activated, RLE elevated - agreeable to OT session focusing on bed mob , functional transfers, [place] education for LB dressing tasks, dynamic balance and THP review  Pt able to recall 2/3 THP - able to recall 3/3 w/ Mod prompting from [address]  Pt reported pain in R hip radiating to R thigh 6/10 - 8/10 following supine > sit transfer  Pt able to tolerate sitting EOB w/ support from bedrails ~30 minutes total for [place] education  Pt noted w/ L lateral lean 2* pain - Mod-Max VCs to sit upright w/ poor follow through  Pt w/ fair carryover of AE use following demonstration to doff/don sock and pants - continue for consistency and appropriate carryover  Pt currently displaying poor functional standing balance requiring Min A when in stance for clothing management and urinal use  Refer to above for further details/comments regarding pt level of assist w/ functional tasks t/o session  Pt currently making fair progress w/ OT POC - will continue to benefit from skilled OT during acute care stay to address noted functional deficits and optimize occupational performance to return to OF    OT recommending continued STR at d/c to maximize safety and functional independence w/ ADLs and functional mob  RN informed of session/pt status and resumed care  Plan   Treatment Interventions ADL retraining;Functional transfer training;Patient/family training;Equipment evaluation/education; Compensatory technique education   Goal Expiration Date 12/06/17   Treatment Day 4   OT Frequency 5x/wk   Recommendation   OT Discharge Recommendation Short Term Rehab   OT - OK to Discharge Yes   Barthel Index   Feeding 5   Bathing 0   Grooming Score 0   Dressing Score 0   Bladder Score 10   Bowels Score 10   Toilet Use Score 5   Transfers (Bed/Chair) Score 5   Mobility (Level Surface) Score 0   Stairs Score 0   Barthel Index Score 35   Modified [place] Scale   Modified [place] Scale 4   QUETA Faustin

## 2017-12-04 LAB
ERYTHROCYTE [DISTWIDTH] IN BLOOD BY AUTOMATED COUNT: 16.3 % (ref 11.6–15.1)
GLUCOSE SERPL-MCNC: 124 MG/DL (ref 65–140)
GLUCOSE SERPL-MCNC: 156 MG/DL (ref 65–140)
GLUCOSE SERPL-MCNC: 199 MG/DL (ref 65–140)
GLUCOSE SERPL-MCNC: 223 MG/DL (ref 65–140)
HCT VFR BLD AUTO: 24.3 % (ref 36.5–49.3)
HCT VFR BLD AUTO: 24.9 % (ref 36.5–49.3)
HCT VFR BLD AUTO: 25.4 % (ref 36.5–49.3)
HGB BLD-MCNC: 7.9 G/DL (ref 12–17)
HGB BLD-MCNC: 8.3 G/DL (ref 12–17)
HGB BLD-MCNC: 8.4 G/DL (ref 12–17)
MCH RBC QN AUTO: 30.8 PG (ref 26.8–34.3)
MCHC RBC AUTO-ENTMCNC: 33.7 G/DL (ref 31.4–37.4)
MCV RBC AUTO: 91 FL (ref 82–98)
PLATELET # BLD AUTO: 413 THOUSANDS/UL (ref 149–390)
PMV BLD AUTO: 8.2 FL (ref 8.9–12.7)
RBC # BLD AUTO: 2.73 MILLION/UL (ref 3.88–5.62)
WBC # BLD AUTO: 7.57 THOUSAND/UL (ref 4.31–10.16)

## 2017-12-04 PROCEDURE — 97116 GAIT TRAINING THERAPY: CPT

## 2017-12-04 PROCEDURE — 97530 THERAPEUTIC ACTIVITIES: CPT

## 2017-12-04 PROCEDURE — 85014 HEMATOCRIT: CPT | Performed by: INTERNAL MEDICINE

## 2017-12-04 PROCEDURE — 82948 REAGENT STRIP/BLOOD GLUCOSE: CPT

## 2017-12-04 PROCEDURE — 85018 HEMOGLOBIN: CPT | Performed by: INTERNAL MEDICINE

## 2017-12-04 PROCEDURE — 97535 SELF CARE MNGMENT TRAINING: CPT

## 2017-12-04 PROCEDURE — 85027 COMPLETE CBC AUTOMATED: CPT | Performed by: INTERNAL MEDICINE

## 2017-12-04 RX ORDER — WARFARIN SODIUM 5 MG/1
5 TABLET ORAL
Status: DISCONTINUED | OUTPATIENT
Start: 2017-12-04 | End: 2017-12-05 | Stop reason: HOSPADM

## 2017-12-04 RX ADMIN — INSULIN GLARGINE 10 UNITS: 100 INJECTION, SOLUTION SUBCUTANEOUS at 21:04

## 2017-12-04 RX ADMIN — ENOXAPARIN SODIUM 105 MG: 120 INJECTION SUBCUTANEOUS at 22:49

## 2017-12-04 RX ADMIN — INSULIN LISPRO 2 UNITS: 100 INJECTION, SOLUTION INTRAVENOUS; SUBCUTANEOUS at 17:46

## 2017-12-04 RX ADMIN — INSULIN LISPRO 2 UNITS: 100 INJECTION, SOLUTION INTRAVENOUS; SUBCUTANEOUS at 21:04

## 2017-12-04 RX ADMIN — LISINOPRIL 40 MG: 20 TABLET ORAL at 08:38

## 2017-12-04 RX ADMIN — DOXAZOSIN 4 MG: 4 TABLET ORAL at 08:37

## 2017-12-04 RX ADMIN — IRON SUCROSE 100 MG: 20 INJECTION, SOLUTION INTRAVENOUS at 08:38

## 2017-12-04 RX ADMIN — METOPROLOL TARTRATE 25 MG: 25 TABLET ORAL at 08:38

## 2017-12-04 RX ADMIN — ACETAMINOPHEN 975 MG: 325 TABLET, FILM COATED ORAL at 21:04

## 2017-12-04 RX ADMIN — ACETAMINOPHEN 975 MG: 325 TABLET, FILM COATED ORAL at 06:51

## 2017-12-04 RX ADMIN — ACETAMINOPHEN 975 MG: 325 TABLET, FILM COATED ORAL at 13:30

## 2017-12-04 RX ADMIN — AMLODIPINE BESYLATE 10 MG: 10 TABLET ORAL at 08:38

## 2017-12-04 RX ADMIN — ASPIRIN 81 MG 81 MG: 81 TABLET ORAL at 08:38

## 2017-12-04 RX ADMIN — POLYETHYLENE GLYCOL 3350 17 G: 17 POWDER, FOR SOLUTION ORAL at 08:38

## 2017-12-04 RX ADMIN — METOPROLOL TARTRATE 25 MG: 25 TABLET ORAL at 21:05

## 2017-12-04 RX ADMIN — WARFARIN SODIUM 5 MG: 5 TABLET ORAL at 17:45

## 2017-12-04 RX ADMIN — INSULIN LISPRO 2 UNITS: 100 INJECTION, SOLUTION INTRAVENOUS; SUBCUTANEOUS at 11:10

## 2017-12-04 RX ADMIN — FOLIC ACID-PYRIDOXINE-CYANOCOBALAMIN TAB 2.5-25-2 MG 1 TABLET: 2.5-25-2 TAB at 08:37

## 2017-12-04 NOTE — OCCUPATIONAL THERAPY NOTE
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   Pain Assessment   Pain Assessment 0-10   Pain Score 3   Pain Location Leg   Pain Orientation Right   ADL   Where Assessed Edge of bed   LB Dressing Assistance 4  Minimal Assistance   LB Dressing Deficit Don/doff R sock; Don/doff L sock; Use of adaptive equipment   Bed Mobility   Supine to Sit 3  Moderate assistance   Additional items HOB elevated;Assist x 1;LE management   Sit to Supine 4  Minimal assistance   Additional items Assist x 1   Cognition   Overall Cognitive Status [facility]   Arousal/Participation Alert; Cooperative   Attention Attends with cues to redirect   Orientation Level Oriented X4   Memory Decreased recall of precautions   Following Commands Follows all commands and directions without difficulty   Activity Tolerance   Activity Tolerance Patient tolerated treatment well;Patient limited by pain   Assessment   Assessment Pt participated in occupational therapy with focus on activity tolerance, bed mob, unsupported sitting balance and tolerance for pt engagement in LB self-care tasks  Pt continues to require assist for bed mob 2* pt decreased overall strength and limited by pt R LE hip pain  Pt however progressing well with use of long handle adaptive equipment to complete doff/donning socks after OT set up  Pt able to recall 2/3 hip precautions  OT continues to recommend pt for in-pt rehab to continue to address pt noted deficits which currently impair pt ADL and functional mob     Plan   Treatment Interventions ADL retraining;Patient/family training;Functional transfer training; Endurance training; Activityengagement   Goal Expiration Date 12/06/17   Treatment Day 5   OT Frequency 5x/wk   Recommendation   OT Discharge Recommendation Short Term Rehab   OT - OK to Discharge Yes  (to in-pt rehab when pt medically stable )   Barthel Index   Feeding 5   Bathing 0   Grooming Score 0   Dressing Score 0   Bladder Score 10   Bowels Score 10   Toilet Use Score 5   Transfers (Bed/Chair) Score 5   Mobility (Level Surface) Score 0   Stairs Score 0   Barthel Index Score 35   Modified [place] Scale   Modified [place] Scale 4       Italo Mooney  BIRCH/L

## 2017-12-04 NOTE — PLAN OF CARE
Problem: PHYSICAL THERAPY ADULT  Goal: Performs mobility at highest level of function for planned discharge setting  See evaluation for individualized goals  Treatment/Interventions: Functional transfer training, LE strengthening/ROM, Therapeutic exercise, Endurance training, Patient/family training, Bed mobility, Gait training, Equipment eval/education  Equipment Recommended: Jennifer Roldan       See flowsheet documentation for full assessment, interventions and recommendations  Outcome: Progressing  Prognosis: Good  Problem List: Decreased strength, Decreased range of motion, Decreased endurance, Impaired balance, Decreased mobility, Pain  Assessment: Pt standing in br with pca and restorative technician upon my arrival   Pt able to increase ambulation distance to 30'x2 with rw and min assist with second for safety/chair follow  Pt reports inability to attain full upright posture due to prior back injury  Pt required seated rest between gt trials due to fatigue/pain  Pt seated on bedside recliner and performed a/aarom rle and arom lle with instruction for technique  Reviewed thp with pt as he was unable to recall independently  Pt remained on recliner with le elevated and chair alarm activated upon completion of session  Ice to posterior right knee/thigh  Pt will continue to benefit from rehab at d/c  Barriers to Discharge: Decreased caregiver support     Recommendation:  (rehab)     PT - OK to Discharge: Yes (to rehab when medically stable for d/c)    See flowsheet documentation for full assessment

## 2017-12-04 NOTE — PLAN OF CARE
Problem: OCCUPATIONAL THERAPY ADULT  Goal: Performs self-care activities at highest level of function for planned discharge setting  See evaluation for individualized goals  Treatment Interventions: ADL retraining, Functional transfer training, UE strengthening/ROM, Endurance training          See flowsheet documentation for full assessment, interventions and recommendations  Outcome: Progressing  Limitation: Decreased ADL status, Decreased UE strength, Decreased Safe judgement during ADL, Decreased endurance, Decreased fine motor control, Decreased self-care trans  Prognosis: Good  Assessment: Pt participated in occupational therapy with focus on activity tolerance, bed mob, unsupported sitting balance and tolerance for pt engagement in LB self-care tasks  Pt continues to require assist for bed mob 2* pt decreased overall strength and limited by pt R LE hip pain  Pt however progressing well with use of long handle adaptive equipment to complete doff/donning socks after OT set up  Pt able to recall 2/3 hip precautions  OT continues to recommend pt for in-pt rehab to continue to address pt noted deficits which currently impair pt ADL and functional mob    Recommendation: PT consult  OT Discharge Recommendation: Short Term Rehab  OT - OK to Discharge: Yes (to in-pt rehab when pt medically stable )

## 2017-12-04 NOTE — PROGRESS NOTES
[facility] Internal Medicine Progress Note  Patient: Abelino Du 80 y o  male   MRN: [id number]  PCP: Solo Jaimes DO  Unit/Bed#: [facility] 705-01 Encounter: [id number]  Date Of Visit: 12/04/17    Assessment and Plan:     ABLA 2/2 right hip hematoma:   Hemoglobin stable  8 3 this morning  Will monitor H/H closely  Continue aspirin  Continue to Hold Lovenox  General surgery following for worsening hematoma of the right hip and thigh   According discussed the general surgery  Since the patient's hemoglobin is stable as per them it is okay to resume anticoagulation  So I will start the patient back on Lovenox and also add Coumadin  Continue to monitor hemoglobin Q 12  Patient was still getting IV iron which will be discontinued today  He got a total of 5 days      TIA s/p TPA on 11/24:   appreciate neuro recs     Continue aspirin  Not on statin 2/2 allergy      Acute LUE DVT:   Anticoagulation stopped because of drop in hemoglobin and worsening hematoma  His hemoglobin has been stable for last 2 days    Discussed with surgery as above      CAD s/p PCI x 2, last stent 2/2016:   EKG showed ST elevations, but these appear to be present in prior EKGs     Trops neg x 3     Plavix discontinued and aspirin increased to full dose      S/p R hip revision MAREN 11/20:   per ortho, WBAT     This appears stable     Pain well controlled with scheduled tylenol    Patient did not tolerate oxycodone well   So it was discontinued     CKD3: Cr stable for now      HTN:   Continue ACEi, Cardura, BB and Norvasc              VTE Pharmacologic Prophylaxis:   Pharmacologic: No AC because of hematoma     Mechanical VTE Prophylaxis in Place: Yes     Patient Centered Rounds: I have performed bedside rounds with nursing staff today      Discussions with Specialists or Other Care Team Provider: will call and discuss with neuro, hem onc and gen surgery     Education and Discussions with Family / Patient:  Called the son and informed him      Time Spent for Care: 30 minutes   More than 50% of total time spent on counseling and coordination of care as described above      Current Length of Stay: 10 day(s)     Current Patient Status: Inpatient      Certification Statement: The patient will continue to require additional inpatient hospital stay due to need to monitor for Hb     Discharge Plan / Estimated Discharge Date:  once Hb stabilizes and after resuming AP     Code Status: Level 1 - Full Code      Subjective:   Pt seen and examined by me this morning  Pt denies any complaints  He said his pain is better today  He is eager to get the Ace bandage from his right thigh removed  Objective:     Vitals:   Temp (24hrs), Av 3 °F (37 4 °C), Min:99 1 °F (37 3 °C), Max:99 4 °F (37 4 °C)    HR:  [66-74] 68  Resp:  [18] 18  BP: (165-179)/(77-80) 179/78  SpO2:  [93 %-95 %] 93 %  Body mass index is 32 99 kg/m²  Input and Output Summary (last 24 hours): Intake/Output Summary (Last 24 hours) at 17 1020  Last data filed at 17 0845   Gross per 24 hour   Intake              840 ml   Output             1725 ml   Net             -885 ml       Physical Exam:     Physical Exam    Constitutional: Pt appears well-developed and well-nourished   morbidly obese  Not in any acute distress  Cardiovascular: Normal rate, regular rhythm, normal heart sounds and intact distal pulses   Exam reveals no gallop and no friction rub  No murmur heard  Pulmonary/Chest: Effort normal and breath sounds normal  No respiratory distress  Pt has no wheezes or rales  Abdominal: Soft   Nondistended nontender Bowel sounds are normal    Musculoskeletal:  Decreased range of motion right lower extremity   Very mild Tenderness to palpation on right thigh laterally  Neurological: alert and oriented to person, place, and time  Normal strength and sensations  Psychiatric: normal mood and affect      Additional Data:     Labs:      Results from last 7 days  Lab Units 12/04/17  0842 12/04/17  0505  11/28/17  0540   WBC Thousand/uL  --  7 57  < > 7 86   HEMOGLOBIN g/dL 8 3* 8 4*  < > 7 6*   HEMATOCRIT % 25 4* 24 9*  < > 22 5*   PLATELETS Thousands/uL  --  413*  < > 243   NEUTROS PCT %  --   --   --  69   LYMPHS PCT %  --   --   --  17   MONOS PCT %  --   --   --  8   EOS PCT %  --   --   --  5   < > = values in this interval not displayed  Results from last 7 days  Lab Units 12/01/17  0512   SODIUM mmol/L 135*   POTASSIUM mmol/L 4 4   CHLORIDE mmol/L 102   CO2 mmol/L 25   BUN mg/dL 27*   CREATININE mg/dL 1 27   CALCIUM mg/dL 8 8   GLUCOSE RANDOM mg/dL 127           * I Have Reviewed All Lab Data Listed Above  * Additional Pertinent Lab Tests Reviewed: [facility] Admission Reviewed    Imaging:    Imaging Reports Reviewed Today Include:   Imaging Personally Reviewed by Myself Includes:      Recent Cultures (last 7 days):           Last 24 Hours Medication List:     acetaminophen 975 mg Oral Q8H [address]   amLODIPine 10 mg Oral Daily   aspirin 81 mg Oral Daily   doxazosin 4 mg Oral Daily   folic acid-pyridoxine-cyanocobalamin 1 tablet Oral Daily   insulin glargine 10 Units Subcutaneous HS   insulin lispro 1-6 Units Subcutaneous HS   insulin lispro 2-12 Units Subcutaneous TID AC   lisinopril 40 mg Oral Daily   metoprolol tartrate 25 mg Oral Q12H [address]   polyethylene glycol 17 g Oral Daily        Today, Patient Was Seen By: Nabeel Ni MD    ** Please Note: This note has been constructed using a voice recognition system   **

## 2017-12-04 NOTE — CASE MANAGEMENT
Continued Stay Review    Date: 17      Vital Signs:   Temp (24hrs), Av 8 °F (37 1 °C), Min:98 2 °F (36 8 °C), Max:99 1 °F (37 3 °C)     HR:  [66-78] 74  Resp:  [16-18] 18  BP: (145-164)/(62-75) 159/75  SpO2:  [94 %-96 %] 94 %  Body mass index is 32 99 kg/m²  Medications:        acetaminophen 975 mg Oral Q8H [address]   amLODIPine 10 mg Oral Daily   docusate sodium 100 mg Oral BID   doxazosin 4 mg Oral Daily   folic acid-pyridoxine-cyanocobalamin 1 tablet Oral Daily   insulin glargine 10 Units Subcutaneous HS   insulin lispro 1-6 Units Subcutaneous HS   insulin lispro 2-12 Units Subcutaneous TID AC   iron sucrose 100 mg Intravenous Daily   lactulose 10 g Oral TID   lisinopril 40 mg Oral Daily   metoprolol tartrate 25 mg Oral Q12H [address]   senna 1 tablet Oral HS           Age/Sex: 80 y o  male     Assessment/Plan:       ABLA 2/2 right hip hematoma:   Hemoglobin still slowly drifting down  Was 7 5 last night  This morning pending collection  Will monitor H/H closely  Hold Lovenox and aspirin for now  CT scan of the hip and right thigh showing possibly worsening hematoma   General surgery consulted and they are following   Currently the plan is to monitor H&H   If still further drops will get a CT of the pelvis and right thigh with contrast   Will recheck hemoglobin later today, if stable, will resume low-dose aspirin once cleared by surgery  If not then we will get the CT with contrast as above      TIA s/p TPA on :   appreciate neuro recs     Aspirin also stopped because of the drop in hemoglobin and worsening hematoma  Not on statin 2/2 allergy      Acute LUE DVT:   Anticoagulation stopped because of drop in hemoglobin and worsening hematoma  Continue to monitor  If any signs of worsening will repeat ultrasound        CAD s/p PCI x 2, last stent 2016:   EKG showed ST elevations, but these appear to be present in prior EKGs     Trops neg x 3     Plavix discontinued and aspirin increased to full dose      S/p R hip revision MAREN 11/20:   per ortho, WBAT   This appears stable   Pain control with and tylenol rtc   Patient did not tolerate oxycodone well   So it was discontinued     CKD3: Cr 1 2 now     HTN: Restarted pt's home meds including ACEi and Cardura   C/w BB and Norvasc         Discharge Plan:     Certification Statement: The patient will continue to require additional inpatient hospital stay due to need to monitor for bleeding     Discharge Plan / Estimated Discharge Date:  once Hb stabilizes and after resuming AC

## 2017-12-04 NOTE — PHYSICAL THERAPY NOTE
Physical Therapy Progress Note     12/04/17 1018   Pain Assessment   Pain Assessment 0-10   Pain Score 7   Pain Type Acute pain;Surgical pain   Pain Location Leg   Pain Orientation Right   Hospital Pain Intervention(s) Cold applied;Repositioned; Ambulation/increased activity   Response to Interventions tolerated   Precautions   Total Hip Replacement ADduction; Internal rotation; Flexion  (right posterior thr)   Restrictions/Precautions   Weight Bearing Precautions Per Order Yes   RLE Weight Bearing Per Order WBAT   Other Precautions THR;WBS;Fall Risk;Pain; Chair Alarm   General   Chart Reviewed Yes   Response to Previous Treatment Patient with no complaints from previous session  Family/Caregiver Present No   Cognition   Overall Cognitive Status WFL   Subjective   Subjective pt reports feeling better and willing to work with therapy   Transfers   Sit to Stand 4  Minimal assistance   Additional items Assist x 1; Armrests; Increased time required;Verbal cues   Stand to Sit 4  Minimal assistance   Additional items Assist x 1; Armrests; Increased time required;Verbal cues   Ambulation/Elevation   Gait pattern Improper Weight shift; Antalgic; Forward Flexion;Decreased foot clearance;Decreased R stance; Excessively slow   Gait Assistance 4  Minimal assist   Additional items Assist x 1;Assist x 2;Verbal cues  (second for chair follow and safety)   Assistive Device Rolling walker   Distance 30'x2 with seated rest between   Stair Management Assistance Not tested   Balance   Static Sitting Good   Static Standing Fair  (with support of rw)   Ambulatory Fair -  (with support of rw)   Activity Tolerance   Activity Tolerance Patient limited by fatigue;Patient limited by pain   Nurse Made Aware nurse An notified of pt's mobility status   Exercises   Hip Abduction Sitting;15 reps;AROM; Bilateral   Hip Adduction Sitting;15 reps;AROM; Bilateral   Knee AROM Long Arc Quad Sitting;15 reps;AROM; Bilateral   Ankle Pumps Sitting;15 reps;AROM; Bilateral   Marching Sitting;15 reps;AAROM;AROM; Bilateral  (aarom rle and arom lle)   Assessment   Prognosis Good   Problem List Decreased strength;Decreased range of motion;Decreased endurance; Impaired balance;Decreased mobility;Pain   Assessment Pt standing in br with pca and restorative technician upon my arrival   Pt able to increase ambulation distance to 30'x2 with rw and min assist with second for safety/chair follow  Pt reports inability to attain full upright posture due to prior back injury  Pt required seated rest between gt trials due to fatigue/pain  Pt seated on bedside recliner and performed a/aarom rle and arom lle with instruction for technique  Reviewed thp with pt as he was unable to recall independently  Pt remained on recliner with le elevated and chair alarm activated upon completion of session  Ice to posterior right knee/thigh  Pt will continue to benefit from rehab at d/c   Barriers to Discharge Decreased caregiver support   Goals   Patient Goals to continue to improve with mobility   STG Expiration Date 12/10/17   Treatment Day 9   Plan   Treatment/Interventions Functional transfer training;LE strengthening/ROM; Therapeutic exercise; Endurance training;Patient/family training;Gait training;Spoke to nursing   Progress Progressing toward goals   PT Frequency 5x/wk; Weekend  (1x weekend)   Recommendation   Recommendation (rehab)   Equipment Recommended Walker  (roller walker)   PT - OK to Discharge Yes  (to rehab when medically stable for d/c)     Duyen Perez PTA

## 2017-12-04 NOTE — PROGRESS NOTES
GENERAL SURGERY    Received a call from the primary team regarding the patients right upper thigh wound  Pt had been c/o pruritis  ACE bandage had been on for several days  Dressing was taken down and there was no visible cellulitis  There are several regions of ecchymosis which is normal given his recent surgery  Non palpable seroma or hematomas, no induration  Wound appears very stable                 Plan   Keep R thigh wound under compression - either ACE bandage or an Abd binder can be wrapped around his thigh  Given that his hemoglobin has been stable he is ok to resume anti- coagulation  Any additional questions please page red surgery     Yasir Pate MD PGY3

## 2017-12-05 ENCOUNTER — HOSPITAL ENCOUNTER (INPATIENT)
Facility: HOSPITAL | Age: 82
LOS: 13 days | Discharge: NURSING, REHAB OR LONG-TERM CARE | DRG: 948 | End: 2017-12-18
Attending: PHYSICAL MEDICINE & REHABILITATION | Admitting: PHYSICAL MEDICINE & REHABILITATION
Payer: MEDICARE

## 2017-12-05 VITALS
BODY MASS INDEX: 32.65 KG/M2 | SYSTOLIC BLOOD PRESSURE: 169 MMHG | WEIGHT: 233.25 LBS | HEART RATE: 67 BPM | RESPIRATION RATE: 16 BRPM | HEIGHT: 71 IN | TEMPERATURE: 98.9 F | OXYGEN SATURATION: 96 % | DIASTOLIC BLOOD PRESSURE: 74 MMHG

## 2017-12-05 DIAGNOSIS — S70.00XA: ICD-10-CM

## 2017-12-05 DIAGNOSIS — N18.9 CKD (CHRONIC KIDNEY DISEASE): ICD-10-CM

## 2017-12-05 DIAGNOSIS — M96.842 SEROMA OF MUSCULOSKELETAL STRUCTURE AFTER MUSCULOSKELETAL SYSTEM PROCEDURE: ICD-10-CM

## 2017-12-05 DIAGNOSIS — Z79.01 CHRONIC ANTICOAGULATION: ICD-10-CM

## 2017-12-05 DIAGNOSIS — E11.9 DM2 (DIABETES MELLITUS, TYPE 2) (HCC): ICD-10-CM

## 2017-12-05 DIAGNOSIS — I10 HTN (HYPERTENSION): Primary | ICD-10-CM

## 2017-12-05 DIAGNOSIS — D64.9 POSTOPERATIVE ANEMIA: ICD-10-CM

## 2017-12-05 DIAGNOSIS — E87.1 HYPONATREMIA: ICD-10-CM

## 2017-12-05 DIAGNOSIS — R39.11 URINARY HESITANCY: ICD-10-CM

## 2017-12-05 LAB
ANION GAP SERPL CALCULATED.3IONS-SCNC: 6 MMOL/L (ref 4–13)
B2 GLYCOPROT1 IGA SER-ACNC: <9 GPI IGA UNITS (ref 0–25)
B2 GLYCOPROT1 IGG SER-ACNC: <9 GPI IGG UNITS (ref 0–20)
B2 GLYCOPROT1 IGM SER-ACNC: <9 GPI IGM UNITS (ref 0–32)
BUN SERPL-MCNC: 22 MG/DL (ref 5–25)
CALCIUM SERPL-MCNC: 8.8 MG/DL (ref 8.3–10.1)
CHLORIDE SERPL-SCNC: 105 MMOL/L (ref 100–108)
CO2 SERPL-SCNC: 25 MMOL/L (ref 21–32)
CREAT SERPL-MCNC: 1.35 MG/DL (ref 0.6–1.3)
ERYTHROCYTE [DISTWIDTH] IN BLOOD BY AUTOMATED COUNT: 16.9 % (ref 11.6–15.1)
F2 GENE MUT ANL BLD/T: NORMAL
GFR SERPL CREATININE-BSD FRML MDRD: 50 ML/MIN/1.73SQ M
GLUCOSE SERPL-MCNC: 129 MG/DL (ref 65–140)
GLUCOSE SERPL-MCNC: 138 MG/DL (ref 65–140)
GLUCOSE SERPL-MCNC: 199 MG/DL (ref 65–140)
GLUCOSE SERPL-MCNC: 199 MG/DL (ref 65–140)
GLUCOSE SERPL-MCNC: 206 MG/DL (ref 65–140)
HCT VFR BLD AUTO: 24.9 % (ref 36.5–49.3)
HCT VFR BLD AUTO: 26.5 % (ref 36.5–49.3)
HGB BLD-MCNC: 8.2 G/DL (ref 12–17)
HGB BLD-MCNC: 8.6 G/DL (ref 12–17)
INR PPP: 1.04 (ref 0.86–1.16)
Lab: NORMAL
MCH RBC QN AUTO: 30.5 PG (ref 26.8–34.3)
MCHC RBC AUTO-ENTMCNC: 32.9 G/DL (ref 31.4–37.4)
MCV RBC AUTO: 93 FL (ref 82–98)
PLATELET # BLD AUTO: 452 THOUSANDS/UL (ref 149–390)
PMV BLD AUTO: 8.5 FL (ref 8.9–12.7)
POTASSIUM SERPL-SCNC: 4.3 MMOL/L (ref 3.5–5.3)
PROTHROMBIN TIME: 13.6 SECONDS (ref 12.1–14.4)
RBC # BLD AUTO: 2.69 MILLION/UL (ref 3.88–5.62)
SODIUM SERPL-SCNC: 136 MMOL/L (ref 136–145)
WBC # BLD AUTO: 8 THOUSAND/UL (ref 4.31–10.16)

## 2017-12-05 PROCEDURE — 85610 PROTHROMBIN TIME: CPT | Performed by: INTERNAL MEDICINE

## 2017-12-05 PROCEDURE — 97110 THERAPEUTIC EXERCISES: CPT

## 2017-12-05 PROCEDURE — 80048 BASIC METABOLIC PNL TOTAL CA: CPT | Performed by: INTERNAL MEDICINE

## 2017-12-05 PROCEDURE — 82948 REAGENT STRIP/BLOOD GLUCOSE: CPT

## 2017-12-05 PROCEDURE — 97530 THERAPEUTIC ACTIVITIES: CPT

## 2017-12-05 PROCEDURE — 85014 HEMATOCRIT: CPT | Performed by: INTERNAL MEDICINE

## 2017-12-05 PROCEDURE — 85027 COMPLETE CBC AUTOMATED: CPT | Performed by: INTERNAL MEDICINE

## 2017-12-05 PROCEDURE — 85018 HEMOGLOBIN: CPT | Performed by: INTERNAL MEDICINE

## 2017-12-05 RX ORDER — ACETAMINOPHEN 325 MG/1
975 TABLET ORAL EVERY 8 HOURS SCHEDULED
Status: DISCONTINUED | OUTPATIENT
Start: 2017-12-05 | End: 2017-12-18

## 2017-12-05 RX ORDER — METOPROLOL TARTRATE 50 MG/1
50 TABLET, FILM COATED ORAL EVERY 12 HOURS SCHEDULED
Status: CANCELLED | OUTPATIENT
Start: 2017-12-05

## 2017-12-05 RX ORDER — DOXAZOSIN MESYLATE 4 MG/1
4 TABLET ORAL DAILY
Status: DISCONTINUED | OUTPATIENT
Start: 2017-12-06 | End: 2017-12-18 | Stop reason: HOSPADM

## 2017-12-05 RX ORDER — AMLODIPINE BESYLATE 10 MG/1
10 TABLET ORAL DAILY
Status: DISCONTINUED | OUTPATIENT
Start: 2017-12-06 | End: 2017-12-18 | Stop reason: HOSPADM

## 2017-12-05 RX ORDER — LISINOPRIL 20 MG/1
40 TABLET ORAL DAILY
Status: CANCELLED | OUTPATIENT
Start: 2017-12-06

## 2017-12-05 RX ORDER — METOPROLOL TARTRATE 50 MG/1
50 TABLET, FILM COATED ORAL EVERY 12 HOURS SCHEDULED
Status: DISCONTINUED | OUTPATIENT
Start: 2017-12-05 | End: 2017-12-05 | Stop reason: HOSPADM

## 2017-12-05 RX ORDER — INSULIN GLARGINE 100 [IU]/ML
10 INJECTION, SOLUTION SUBCUTANEOUS
Status: DISCONTINUED | OUTPATIENT
Start: 2017-12-05 | End: 2017-12-07

## 2017-12-05 RX ORDER — ASPIRIN 81 MG/1
324 TABLET, CHEWABLE ORAL DAILY
Status: DISCONTINUED | OUTPATIENT
Start: 2017-12-06 | End: 2017-12-05 | Stop reason: HOSPADM

## 2017-12-05 RX ORDER — DOXAZOSIN MESYLATE 4 MG/1
4 TABLET ORAL DAILY
Status: CANCELLED | OUTPATIENT
Start: 2017-12-06

## 2017-12-05 RX ORDER — ASPIRIN 81 MG/1
324 TABLET, CHEWABLE ORAL DAILY
Status: CANCELLED | OUTPATIENT
Start: 2017-12-06

## 2017-12-05 RX ORDER — BISACODYL 10 MG
10 SUPPOSITORY, RECTAL RECTAL DAILY PRN
Status: DISCONTINUED | OUTPATIENT
Start: 2017-12-05 | End: 2017-12-18

## 2017-12-05 RX ORDER — GLYBURIDE 5 MG/1
5 TABLET ORAL 2 TIMES DAILY WITH MEALS
Status: CANCELLED | OUTPATIENT
Start: 2017-12-05

## 2017-12-05 RX ORDER — POLYETHYLENE GLYCOL 3350 17 G/17G
17 POWDER, FOR SOLUTION ORAL DAILY
Status: DISCONTINUED | OUTPATIENT
Start: 2017-12-06 | End: 2017-12-18

## 2017-12-05 RX ORDER — WARFARIN SODIUM 5 MG/1
5 TABLET ORAL
Status: CANCELLED | OUTPATIENT
Start: 2017-12-05

## 2017-12-05 RX ORDER — METOPROLOL TARTRATE 50 MG/1
50 TABLET, FILM COATED ORAL EVERY 12 HOURS SCHEDULED
Status: DISCONTINUED | OUTPATIENT
Start: 2017-12-05 | End: 2017-12-07

## 2017-12-05 RX ORDER — ASPIRIN 81 MG/1
324 TABLET, CHEWABLE ORAL DAILY
Status: DISCONTINUED | OUTPATIENT
Start: 2017-12-06 | End: 2017-12-18 | Stop reason: HOSPADM

## 2017-12-05 RX ORDER — ACETAMINOPHEN 325 MG/1
975 TABLET ORAL EVERY 8 HOURS SCHEDULED
Status: CANCELLED | OUTPATIENT
Start: 2017-12-05

## 2017-12-05 RX ORDER — AMLODIPINE BESYLATE 10 MG/1
10 TABLET ORAL DAILY
Status: CANCELLED | OUTPATIENT
Start: 2017-12-06

## 2017-12-05 RX ORDER — POLYETHYLENE GLYCOL 3350 17 G/17G
17 POWDER, FOR SOLUTION ORAL DAILY
Status: CANCELLED | OUTPATIENT
Start: 2017-12-06

## 2017-12-05 RX ORDER — WARFARIN SODIUM 5 MG/1
5 TABLET ORAL
Status: DISCONTINUED | OUTPATIENT
Start: 2017-12-05 | End: 2017-12-07

## 2017-12-05 RX ORDER — LISINOPRIL 20 MG/1
40 TABLET ORAL DAILY
Status: DISCONTINUED | OUTPATIENT
Start: 2017-12-06 | End: 2017-12-18 | Stop reason: HOSPADM

## 2017-12-05 RX ADMIN — ACETAMINOPHEN 975 MG: 325 TABLET, FILM COATED ORAL at 05:55

## 2017-12-05 RX ADMIN — INSULIN LISPRO 4 UNITS: 100 INJECTION, SOLUTION INTRAVENOUS; SUBCUTANEOUS at 11:08

## 2017-12-05 RX ADMIN — ENOXAPARIN SODIUM 105 MG: 120 INJECTION SUBCUTANEOUS at 05:55

## 2017-12-05 RX ADMIN — WARFARIN SODIUM 5 MG: 5 TABLET ORAL at 17:56

## 2017-12-05 RX ADMIN — LISINOPRIL 40 MG: 20 TABLET ORAL at 08:45

## 2017-12-05 RX ADMIN — METOPROLOL TARTRATE 25 MG: 25 TABLET ORAL at 08:45

## 2017-12-05 RX ADMIN — AMLODIPINE BESYLATE 10 MG: 10 TABLET ORAL at 08:45

## 2017-12-05 RX ADMIN — METOPROLOL TARTRATE 25 MG: 25 TABLET ORAL at 12:20

## 2017-12-05 RX ADMIN — ACETAMINOPHEN 975 MG: 325 TABLET, FILM COATED ORAL at 13:04

## 2017-12-05 RX ADMIN — ACETAMINOPHEN 975 MG: 325 TABLET, FILM COATED ORAL at 22:05

## 2017-12-05 RX ADMIN — INSULIN GLARGINE 10 UNITS: 100 INJECTION, SOLUTION SUBCUTANEOUS at 22:06

## 2017-12-05 RX ADMIN — FOLIC ACID-PYRIDOXINE-CYANOCOBALAMIN TAB 2.5-25-2 MG 1 TABLET: 2.5-25-2 TAB at 08:45

## 2017-12-05 RX ADMIN — ASPIRIN 81 MG 81 MG: 81 TABLET ORAL at 08:45

## 2017-12-05 RX ADMIN — DOXAZOSIN 4 MG: 4 TABLET ORAL at 08:45

## 2017-12-05 RX ADMIN — ENOXAPARIN SODIUM 105 MG: 120 INJECTION SUBCUTANEOUS at 22:06

## 2017-12-05 RX ADMIN — POLYETHYLENE GLYCOL 3350 17 G: 17 POWDER, FOR SOLUTION ORAL at 08:45

## 2017-12-05 RX ADMIN — METOPROLOL TARTRATE 50 MG: 50 TABLET ORAL at 21:05

## 2017-12-05 RX ADMIN — INSULIN LISPRO 1 UNITS: 100 INJECTION, SOLUTION INTRAVENOUS; SUBCUTANEOUS at 22:05

## 2017-12-05 NOTE — SOCIAL WORK
CM was informed that pt is medically stable for dc today  KENYATTA spoke to [facility] who states pt is accepted and a bed is available today  Pt is scheduled for a 1pm dc to [facility] room 960 bed 2  CM notified pts bedside RN An of dc time  Dr Rohan Khoury spoke to pts son and informed of dc time

## 2017-12-05 NOTE — CONSULTS
Consultation - Mikey Claros 80 y o  male MRN: [id number]    Unit/Bed#: -00 Encounter: [id number]        History of Present Illness     HPI: Mikey Claros is a 80 y o  male, male, with PMH of CAD with CONCEPCION, DM type 2, HTN and hyperlipidemia, who underwent Rt hip revision at [place] [address]  in [place] and was then transferred to [facility]  Two days after admission to rehab pt developed dysarthria and Lt sided weakness  He was transported to the ER and a CVA alert was called  He was seen by Dr Merly Pizano  Head CT revealed no acute findings  CTA of the head and neck revealed a small focal area of severe stenosis at the inferior division of the Rt MCA in the range of 90%  Moderate to severe atherosclerotic dz with the narrowing in the proximal to midportion of the basilar artery  Possible total to subtotal occlusion of the Rt vertebral artery  TPA was started  During the infusion pt complained of sudden onset of a severe HA in the Lt temporoparietal region  TPA was placed on hold and a stat head CT was negative  The remaining tPA was not infused  Pt then developed worsening Rt hip pain  He was found to have induration of the Rt thigh and was oozing blood from the surgical site  Pt also became hypotensive  He was transferred to [facility] ICU  Pt was transfused with 4U PRBCs  Head CT did not reveal any acute pathology  MRI of the brain also did not reveal any acute findings  ECHO revealed an EF of 55%  Lt atrium with size in the upper limits of normal and Rt atrium was mildly dilated  Plavix was [id number]/17  Possible ST elevation was seen on telemetry and an EKG revealed inferior Q waves and some ST changes  Cardiology was consulted and ASA was resumed  Cardiac enzymes were negative  Bilat UE Doppler was performed due to swelling of the Lt UE and revealed acute occlusive DVT in the Lt brachial vein    Therapeutic Lovenox was started with a plan to transition to Coumadin  Pt was seen by Hematology and given Venofer  As pt was anticoagulated Plavix was stopped and ASA increased to full strength with a plan to resume Plavix once 6 month course of anticoagulation has been completed  P2Y12 and API 1-2 weeks after resuming Plavix  On 11/30/17 pt had a drop in Hgb to 6 5  He was transfused with 1U PRBCs and a stat CT of the abdomen and pelvis as well as a CT of the Rt femur were done and possible worsening hematoma was noted  Pt was seen by General Surgery and ASA and Lovenox were held until Hgb stabilized  ASA and Lovenox were resumed and Coumadin was started 12/4/17  ROS:  Constitutional: Negative  HENT: Negative  Respiratory: Negative  Cardiovascular: Negative  Gastrointestinal: Negative  Musculoskeletal: Rt hip pain    Neurological: Negative  Psychiatric/Behavioral: Negative  Historical Information   Past Medical History:   Diagnosis Date    CAD (coronary artery disease)     DM2 [facility]diabetes mellitus, type 2) ([facility] [address] )     History of transfusion     HLD (hyperlipidemia)     HTN (hypertension)      Past Surgical History:   Procedure Laterality Date    HIP HARDWARE REMOVAL      TOTAL HIP ARTHROPLASTY Right      Social History   History   Alcohol Use No     History   Drug Use No     History   Smoking Status    Never Smoker   Smokeless Tobacco    Never Used     Family History   Problem Relation Age of Onset    Family history unknown: Yes       Meds/Allergies   current meds:  No current facility-administered medications for this encounter          PTA meds:   Prescriptions Prior to Admission   Medication    amLODIPine (NORVASC) 10 mg tablet    ASPIRIN 81 PO    clopidogrel (PLAVIX) 75 mg tablet    docusate sodium (COLACE) 100 mg capsule    doxazosin (CARDURA) 4 mg tablet    ELEMENTAL MAGNESIUM PO    glyBURIDE (DIABETA) 5 mg tablet    lisinopril (ZESTRIL) 40 mg tablet    metoprolol succinate (TOPROL-XL) 25 mg 24 hr tablet    oxybutynin (DITROPAN) 5 mg tablet    acetaminophen (TYLENOL) 325 mg tablet    oxyCODONE (ROXICODONE) 5 mg immediate release tablet    polyethylene glycol (MIRALAX) 17 g packet    senna (SENOKOT) 8 6 MG tablet     Allergies   Allergen Reactions    Celecoxib     Hydromorphone     Pravastatin Hives       Objective   Vitals: Blood pressure 155/76, pulse 70, temperature 98 6 °F (37 °C), temperature source Oral, resp  rate 20, height 5' 10" (1 778 m), weight 101 kg (223 lb 5 2 oz), SpO2 97 %  Physical Exam   Constitutional: Pt is oriented to person, place, and time  HENT:   Head: Normocephalic  Eyes: EOM are normal  Pupils are equal, round, and reactive to light  Neck: Neck supple  Cardiovascular: Normal rate and regular rhythm  No murmur heard  Pulmonary/Chest: Breath sounds normal  No respiratory distress  Pt has no wheezes  Pt has no rales  Abdominal: Soft  Bowel sounds are normal  Pt exhibits no distension  There is no tenderness  There is no rebound and no guarding  Musculoskeletal: No edema  Rt hip incision healing  Neurological: Pt is alert and oriented to person, place, and time  CN II-XII intact  Strength 5/5 UE bilat and Lt LE  Rt LE 5/5 except hip flexion 4/5  - due to pain  Reflexes 1/4 UE and LE bilat  Toes downgoing bilat  Psychiatric: Pt has a normal mood and affect  Lab Results:   Results from last 7 days  Lab Units 12/05/17  1017 12/05/17  0543  12/04/17  0505   WBC Thousand/uL  --  8 00  --  7 57   HEMOGLOBIN g/dL 8 6* 8 2*  < > 8 4*   HEMATOCRIT % 26 5* 24 9*  < > 24 9*   PLATELETS Thousands/uL  --  452*  --  413*   < > = values in this interval not displayed      Results from last 7 days  Lab Units 12/05/17  0543 12/01/17  0512   SODIUM mmol/L 136 135*   POTASSIUM mmol/L 4 3 4 4   CHLORIDE mmol/L 105 102   CO2 mmol/L 25 25   BUN mg/dL 22 27*   CREATININE mg/dL 1 35* 1 27   GLUCOSE RANDOM mg/dL 129 127   CALCIUM mg/dL 8 8 8 8           Results from last 7 days  Lab Units 12/05/17  0543   INR  1 04       Glucose (mg/dL)   Date Value   12/05/2017 129   12/01/2017 127   11/30/2017 258 (H)   11/28/2017 159 (H)   12/01/2015 141 (H)   07/14/2015 124 (H)   09/11/2014 116 (H)   01/09/2014 134 (H)     Glucose, i-STAT (mg/dl)   Date Value   11/24/2017 171 (H)       Labs reviewed    Imaging: reviewed  EKG, Pathology, and Other Studies: I have personally reviewed pertinent reports  VTE Prophylaxis: Enoxaparin (Lovenox) and Warfarin (Coumadin)    Code Status: Prior   Advance Directive and Living Will:      Power of :    POLST:      Assessment/Plan     1  TPA aborted CVA vs TIA:  Pt does have multiple areas of intracranial atherosclerosis with severe stenosis of the inferior Rt MCA  Continue ASA  Resume Plavix once pt completes a 6 month course of anticoagulation for Lt UE DVT  No statin as pt had hives in the past   Therapy per primary service  2  Rt hip revision:  Pain control  Monitor incision  Follow-up with [facility]  3  Rt hip hematoma:  s/p tPA  Monitor Hgb and transfuse as needed  4  Acute blood loss anemia:  s/p multiple transfusions  Monitor Hgb and transfuse as indicated  5  Lt UE DVT:  Continue therapeutic Lovenox until INR > 2  INR in AM   Thrombosis Panel: + lupus anticoagulant, mildly decreased antithrombin III, PTT-LA mixing study prolonged at 53 sec, and hexagonal phase phospholipid elevated at 16  Pt will need Hematology follow-up  6  CAD with CONCEPCION:  Continue ASA  7  HTN:  Monitor BP q shift  Continue amlodipine 10mg daily, Cardura 4mg daily, lisinopril 40mg daily and Lopressor 50mg every 12 hours  8  CKD stage III:  Baseline Cr 1 4-1 5  Will monitor  9  DM type 2:  AIC in April 7 3  Pt takes glyburide 5mg 2 tabs in the AM and 1 tab in the PM   Pt has been on insulin coverage and 10U Lantus at bedtime  Will add back glyburide 5mg in the AM, monitor blood sugars and adjust medications as needed      Counseling / Coordination of Care  Total floor / unit time spent today 60 minutes  Greater than 50% of total time was spent with the patient and / or family counseling and / or coordination of care        Hansel Landry PA-C

## 2017-12-05 NOTE — H&P
H&P Exam - Yessy Medal 80 y o  male MRN: [id number]    Unit/Bed#: -02 Encounter: [id number]      Primary Rehab dx:  Debility     History of Present Illness   Patient is a 79 yo male s/p revision of Rt MAREN by Dr Mata Zaldivar on 11/20/17 p/w stroke like symptoms and was given TPA however developed Rt thigh/gluteal hematoma; course complicated by LUE DVT  Issue #1: per patient pain currently manageable and would not like additional pain medication at this time  Issute #2: per patient had BM today         PMHx:  CAD s/p stents, DM, HTN, elevated PSA/BPH/incontinence, basal cell carcinoma, PAD (B/L renal artery stenosis, & celiac trunk stenosis), CKD; Incidental lab findings: hyperparathyroidism, elevated total/direct bilirubin, elevated homocysteine level; Incidental findings of EKG of 11/26/17 15:17 per Dr Harrington Arms interpretation: PVCs, RBBB/wide QRS, borderline NJ interval (200) and prolonged QTc; Incidental findings of echo (11/25): very mild AS, aortic root dilation/ascending aorta dilation;  Incidental findings noted on CT A/P (11/30), CT C/A/P (11/24): L renal lesions (noted to be stable renal cysts that appear to be simple on U/S of 11/28), hiatal hernia (no c/o of GERD/reflux symptoms), sebaceous cyst anterior to the sternum Other incidental findings: Rt vertebral artery occlusion      Family History: Non contributory    ROS:  Constitutional: denies fevers, chills  HEENT: denies tinnitus  Pulm: denies SOB  CV: denies CP  GI: denies abdominal pain  Neuro: denies headache  Skin: denies rashes  Psych: denies depression  Extremities: denies edema    Allergies   Allergen Reactions    Celecoxib     Hydromorphone     Pravastatin Hives         Funtional status on admission: min amb/tx, min-mod ADLs   Functional status prior to admission: I PTA   Social history: lives w/spouse in mobile home w/4 DILMA    Objective       Gen: NAD   HEENT: No swelling of the tongue  Pulm: respirations unlabored  Card: +S1/S2   Abd: soft NT  Neuro: CN grossly intact, lt touch grossly intact, finger to nose without gross dysmetria b/l  MSK: 4/5 LUE/RUE, 4/5 LLE hip flexion/plantar flexion/dorsiflexion, 4-/5 RLE plantar flexion/dorsiflexion    Skin: no rashes  Psych: mood/affect stable               Comorbidities:   CAD s/p stents, DM, HTN, elevated PSA/BPH/incontinence, basal cell carcinoma, PAD (B/L renal artery stenosis, & celiac trunk stenosis), CKD, anemia (ABLA), thrombocytosis; Incidental lab findings: hyperparathyroidism, elevated total/direct bilirubin, elevated homocysteine level; Incidental findings of EKG of 11/26/17 15:17 per Dr Marquis Alfaro interpretation: PVCs, RBBB/wide QRS, borderline MT interval (200) and prolonged QTc; Incidental findings of echo (11/25): very mild AS, aortic root dilation/ascending aorta dilation; Incidental findings noted on CT A/P (11/30), CT C/A/P (11/24): L renal lesions (noted to be stable renal cysts that appear to be simple on U/S of 11/28), hiatal hernia (no c/o of GERD/reflux symptoms), sebaceous cyst anterior to the sternum Other incidental findings: Rt vertebral artery occlusion    Assessment:  Patient is a 81 yo male s/p revision of Rt MAREN by Dr Samantha Saleh on 11/20/17 p/w stroke like symptoms and was given TPA however developed Rt thigh/gluteal hematoma; course complicated by LUE DVT    Plan:    Rehabilitation Necessity:   Medical impact on function as a result of impaired functional mobility, bed mobility, transfers, self-care/ADL's, endurance, range of motion/flexibility, and impaired coordination  Treatment plan will include a comprehensive rehabilitation program with intense therapies for 3 hours/day, 5 days/week  1  24-hour availability of a physician specializing in rehabilitation who will coordinate the rehabilitation disciplines, manage the comorbid conditions, monitor the patient's functional improvement, and maximize the rehabilitation outcome    2  Physical therapy to address bed mobidility, car and mat transfer, functional mobility with use of least restrictive assistive device, truncal strengthening, coordination, range of motion/flexibility, durable medical equipment evaluation, patient and family instruction and endurance training  3  Occupational therapy to address feeding, grooming, upper and lower body dressing and bathing, toileting, tub/toilet/bed transfers, durable medical equipment evaluation, range of motion/flexibility, dexterity, coordination and patient and family instruction  4  Rehabilitation nursing 24 hours per day to monitor bowel and bladder function, work on bowel routine, assess falls risk upon admission and periodically thereafter, implement and revise falls prevention strategies, maintain skin integrity through initial and daily pressure sore risk assessment (Lucio scale), implement and revise pressure sore prevention strategies, educate patient and family members regarding medication administration, ADL's, transfers, and mobility and continue therapy carryover with ADL's, transfers, and mobility  5  Social work and case management consults for discharge planning/disposition issues, as well as coordination and communication of patient progress between family and providers    6  Rehab psychology- as needed for adjustment, coping      Pain: currently on tylenol ATC, intolerant to 5 mg Oxy IR per family (excessive sedation), allergy to dilaudid; trialed tramadol in the past prior to surgery for hip pain without relief, per patient and family at this time they would like to use tylenol only     Rt MAREN revison: performed on 11/20/17 by Dr Sri Humphrey, pt is 2 wks post op discussed case with Dr Sri Humphrey and in the setting of hematoma Dr Sri Humphrey has recommended leaving staples in for 1 more week and then reassess if staples can be removed then; hip precautions & WBAT per ortho and confirmed with Dr Collins Mention DVT: lovenox to coumadin bridge (INR 1 04); noted to have mildly decreased AT III level at 88 (LLN 92) and + Lupus anticoagulant (per lab recs this is to be repeated in 12 wks to confirm); FU with Dr Chucky Jimenez to arrange FU testing and management of AC as OP    Rt thigh/gluteal hematoma: monitor Hg, cleared by surgery to be on lovenox to coumadin bridge (INR 1 04)  and aspirin 325 mg QD     Likely CVA: per neurology cleared to dc home plavix and instead increase home ASA 81 mg QD to   mg QD (due to non-response at ASA 81 mg QD per API) with AC and once AC stopped at Dr Mark Alegria discretion in 6 months neurology recommends dual AP therapy; API in 1 wk; patient with allergy to statins (myalgia not hives)    CAD s/p stents: per cards cleared to dc home plavix and instead increase home ASA 81 mg QD to  mg QD due to non-response to ASA 81 mg QD per API for stroke prevention (in addition to [facility] and once [facility] stopped at Dr Mark Alegria discretion neurology recommends dual AP therpy); on BB, patient with allergy to statins (myalgia not hives); FU with Dr Corinne Stake    DM: per OP records patient take glyburide 10 mg QAM and 5 mg QPM however given CKD will defer to IM regarding risk/benefits of this agent in patient with stable CKD versus switch to short-acting sulfonylureas     HTN: on home regimen of cardura 4 mg qd (also for BPH), norvasc 10 mg qdaily, lisinopril 40 mg qd, however home toprol XL 25 mg Q12 has been switched to lopressor 50 mg q12    elevated PSA/BPH/incontinence: on home cardura 4 mg qd; was also on oxybutynin 5 mg qd at home which has not yet been restarted    basal cell carcinoma: FU with Dr Yuri Denton who may refer patient for Mohs surgery  PAD (B/L renal artery stenosis, & celiac trunk stenosis): follows with Dr Tiffani Herrera, currently on AP for CAD and possible CVA, allergy to statins (myalgia not hives)    CKD: baseline Cr 1 5-1 7, currently 1 35, FU with Dr Nicolette Browne  Anemia: ABLA with likely component of AOCD (CKD);  Hg currently stable at 8 6  Thrombocytosis: mild likely reactive at 452   Note: Vitamin D level pending    DVT ppx: lovenox to coumadin bridge (INR currently 1 04)    Incidental lab findings: hyperparathyroidism (noted to be mild elevated above ULN of 72 at 78 9 on 1/18/17 likely 2/2 to CKD OP FU with Dr Norman Rg), elevated total/direct bilirubin (sample hemolyzed, OP FU with PCP for repeat testing and/or specialist referral at PCPs discretion), elevated homocysteine level (mildly elevated above ULN of 14 2 at 15 1, started on a folic FCCK/T1/D48 tablet in acute care; OP FU w/ Dr Claudy Trinidad for B6/B12/Folate testing at Dr Escamilla Roles discretion and OP FU with patient's own cardiologist Dr Stacy Gallegos)     Incidental findings of EKG of 11/26/17 15:17 per Dr Elana Cerda interpretation: PVCs, RBBB/wide QRS, borderline DC interval (200) and prolonged QTc; evaluated and cleared from cards standpoint (seen by Dr Elana Cerda on 11/26 and Dr Danyelle Carolina on 11/27) in acute care; OP FU with Dr Stacy Gallegos     Incidental findings of echo (11/25): very mild AS, aortic root dilation/ascending aorta dilation; OP FU with Dr Stacy Gallegos    Incidental findings noted on CT A/P (11/30) & CT C/A/P (11/24): L renal lesions (noted to be stable renal cysts that appear to be simple on U/S of 11/28, OP FU with Dr Norman Rg), hiatal hernia (no c/o of GERD/reflux symptoms, OP FU with PCP), sebaceous cyst anterior to the sternum (OP FU with PCP and/or Dr Kendra Vanegas)     Other incidental findings: Rt vertebral artery occlusion, OP FU at [address] [facility] (follows with Dr Pancho Yan for PAD)         III  Estimated length of stay:  Weeks: 1-2    IV  Goals  Maximize functional transfers, ADLs, and mobility to decrease caregiver burden  V  Anticipated discharge setting  Home    VI   Prognosis:  Fair     CMS Required Post-Admission Physician Evaluation Elements  History and Physical, including medical history, functional history and active comorbidities as in above brent Hwang Physician Evaluation:  The patient has the potential to make improvement and is in need of at least 2 of the following multidisciplinary therapies including, but not limited to physical, occupational, speech and respiratory  The patient may also need nutritional services, wound care and prosthetics/orthotics prescription  Given the patient's complex medical condition and risk of further medical complications, rehabilitative services cannot be safely provided at a lower level of care, such as a skilled nursing facility  I have reviewed the patient's functional and medical status at the time of the preadmission screening and they are the same as on the day of this admission  I acknowledge that I have personally performed a full physical examination on this patient within 24 hours of admission  I have determined that the patient is able to tolerate the above course of treatment, at the appropriate level of intensity, for a reasonable period of time  The patient demonstrated understanding the rehabilitation program and the discharge process after we discussed them    Agree in entirety: yes  Minor adaptions: none   Major changes: none    Note:    In addition to rehab needs this patient requires acute inpatient rehabilitation for ABLA, Rt thigh/gluteal hematoma

## 2017-12-05 NOTE — DISCHARGE INSTRUCTIONS
Return to [facility] for further management after hip surgery    Please follow up with Dr Kerns Route - need repeat lupus anticoagulant panel in 3 months to see if you need to be on coumadin lifetime  Check API in 1 week    Will need Lovenox until INR therapeutic for 2 consecutive days  Also follow up with neurology      Deep Venous Thrombosis   WHAT YOU NEED TO KNOW:   Deep venous thrombosis (DVT) is a blood clot that forms in a deep vein of the body  The deep veins in the legs, thighs, and hips are the most common sites for DVT  DVT can also occur in a deep vein within your arms  The clot prevents the normal flow of blood in the vein  The blood backs up and causes pain and swelling  The DVT can break into smaller pieces and travel to your lungs and cause a blockage called a pulmonary embolism  A pulmonary embolism can become life-threatening  DISCHARGE INSTRUCTIONS:   Call 911 for any of the following:   · You feel lightheaded, short of breath, and have chest pain  · You cough up blood  Seek care immediately if:   · Your symptoms get worse  · You develop new DVT symptoms in another leg or arm  Contact your healthcare provider if:   · Your gums or nose bleed  · You see blood in your urine or bowel movements  · Your bowel movements are black or darker than normal     · You have questions or concerns about your conditions or care  Medicines:   · Blood thinners  help prevent the DVT from getting bigger and prevent new clots from forming  Examples of blood thinners include heparin, rivaroxaban, apixiban, and warfarin  The following are general safety guidelines to follow while you are taking a blood thinner:     ¨ Watch for bleeding and bruising  Watch for bleeding from your gums or nose  Watch for blood in your urine and bowel movements  Use a soft washcloth on your skin, and a soft toothbrush to brush your teeth  This can keep your skin and gums from bleeding   If you shave, use an electric shaver  Do not play contact sports  ¨ Tell your dentist and other healthcare providers that you take a blood thinner  Wear a bracelet or necklace that says you take this medicine  ¨ Do not start or stop any medicines unless your healthcare provider tells you to  Many medicines cannot be used with blood thinners  ¨ Tell your healthcare provider right away if you forget to take the blood thinner , or if you take too much  ¨ Warfarin  is a blood thinner that you may need to take  The following are additional things you should be aware of if you take warfarin:    § Foods and medicines can affect the amount of warfarin in your blood  Do not make major changes to your diet  Warfarin works best when you eat about the same amount of vitamin K every day  Vitamin K is found in green leafy vegetables and certain other foods  Ask for more information about what to eat or not to eat  § You will need to see your healthcare provider for follow-up visits  You will need regular blood tests to decide how much warfarin you need  · Take your medicine as directed  Contact your healthcare provider if you think your medicine is not helping or if you have side effects  Tell him or her if you are allergic to any medicine  Keep a list of the medicines, vitamins, and herbs you take  Include the amounts, and when and why you take them  Bring the list or the pill bottles to follow-up visits  Carry your medicine list with you in case of an emergency  Manage your DVT:   · Wear pressure stockings  The stockings are tight and put pressure on your legs  This improves blood flow and helps prevent clots  Wear the stockings during the day  Do not wear them when you sleep  · Elevate your legs  above the level of your heart as often as you can  This will help decrease swelling and pain  Prop your legs on pillows or blankets to keep them elevated comfortably       · Exercise regularly  to help increase your blood flow  Walking is a good low-impact exercise  Talk to your healthcare provider about the best exercise plan for you  · Change body positions often  If you travel by car or work at a desk, move and stretch in your seat several times each hour  In an airplane, get up and walk every hour  If you are bedridden, ask for help to change your position every 1 to 2 hours  · Maintain a healthy weight  Ask your healthcare provider how much you should weigh  Ask him to help you create a weight loss plan if you are overweight  · Do not smoke  Nicotine and other chemicals in cigarettes and cigars can damage blood vessels and make it more difficult to manage your DVT  Ask your healthcare provider for information if you currently smoke and need help to quit  E-cigarettes or smokeless tobacco still contain nicotine  Talk to your healthcare provider before you use these products  Follow up with your healthcare provider as directed:  Write down your questions so you remember to ask them during your visits  © 2017 [facility] Information is for End User's use only and may not be sold, redistributed or otherwise used for commercial purposes  All illustrations and images included in CareNotes® are the copyrighted property of [employer] , [employer]  or James Hoyt  The above information is an  only  It is not intended as medical advice for individual conditions or treatments  Talk to your doctor, nurse or pharmacist before following any medical regimen to see if it is safe and effective for you

## 2017-12-05 NOTE — PLAN OF CARE
DISCHARGE PLANNING - CARE MANAGEMENT     Discharge to post-acute care or home with appropriate resources Completed        PAIN - ADULT     Verbalizes/displays adequate comfort level or baseline comfort level Completed        Potential for Falls     Patient will remain free of falls Completed        Prexisting or High Potential for Compromised Skin Integrity     Skin integrity is maintained or improved Completed        SAFETY ADULT     Maintain or return to baseline ADL function Completed     Maintain or return mobility status to optimal level Completed

## 2017-12-05 NOTE — PROGRESS NOTES
PHYSICAL MEDICINE AND REHABILITATION   PREADMISSION ASSESSMENT     Projected [facility] Diagnoses:  Impairment of mobility, safety and Activities of Daily Living (ADLs) due to Debility:  16  Debility (Non-cardiac/Non-pulmonary)  Etiologic: multiple medical problems DVT/TIA/hematoma/recent hip surgery  Date of Onset: 11/24/17   Date of surgery: N/A    PATIENT INFORMATION  Name: Rosa Stern Phone #: [phone] (home)   Address: [place] [address]  YOB: 1935 Age: 80 y o  SS#   Marital Status:   Ethnicity:   Employment Status: retired  Extended Emergency Contact Information  Primary Emergency Contact: Emma 47 [address] Phone: [phone]  Relation: Son  Secondary Emergency Contact: Emma 47 [facility]  [employer] Phone: [phone]  Relation: Son  Advance Directive: LEVEL 1 FULL CODE, UNKNOWN ADVANCED DIRECTIVE    INSURANCE/COVERAGE:     Primary Payor: MEDICARE / Plan: MEDICARE A AND B / Product Type: Medicare A & B Fee for Service /   Secondary Payer: [employer]   Payer Contact:  Payer Contact:   Contact Phone:  Contact Phone:   Authorization #:   Coverage Dates:  LCD:   MEDICARE #: [id number]  Medicare Days: 60/30/60  Medical Record #: [id number]    REFERRAL SOURCE:   Referring provider: Armand Loja DO  Referring facility: [address] [place] [address]  Room: [place] [address]/[place] [address]  PCP: Suyapa Whitman DO PCP phone number: [phone]    MEDICAL INFORMATION  HPI: Patient is a 80year old male who presented to [facility] on 11/24/17    Patient presented from [facility] with complaints of left arm weakness that had been occurring since the am   Patient also offered complaints of left sided coordination issues, and trouble speaking   (Of note: patient was at [facility] for acute rehab after a right hip replacement which was originally done on 11/20/17 at [facility] in [facility]  On 11/24/17 a CT of the brain was done which showed no evidence of acute CVA or hemorrhage  TPA was administered even after extensive discussion with family explaining the risks associated with TPA administration after hip replacement  TPA was held 20 minutes prior to completion when patient began complaining of acute left head pain and new right hip pain  It was noted that patient had a right thigh induration and was noted to have blood oozing from surgical site  It was then recommended to transfer patient to [facility] for concern of hemorrhage  On neuro exam the patient had left hemipareses that appeared worse although there was clinical improvement subsequent to his tPA infusion, but since his blood pressure has dropped, patient has had worsening signs with slurred speech, left hemiparesis and also developing a field cut  TPA was immediately held and CT head chest abdomen pelvis was obtained  CT of the head was without acute intracranial hemorrhage  A collection was noted at the right hip on CT, likely representing hematoma/blood collection  Patient was then given 1 unit of PRBCs  Patient was then flown to [facility] via helicopter on 57/07/70  Vascular duplex scan of the bilateral upper extremities performed demonstrated an acute occlusive DVT in a single branch of the brachial vein extending from the upper to mid arm  Repeat CT head as per IV tPA protocol was unremarkable for any acute intracranial pathology  MRI was also negative for any acute intracranial abnormality  Echo was also performed during hospitalization which showed an EF of 55%  Patient was evaluated by Orthopedics and Neurology  Orthopedics deemed that the patient may be able to weightbear as tolerated to the right lower extremity and did not pursue any surgical intervention   Neurology re-evaluated the patient and deemed that the patient may have had a TIA versus his left upper extremity weakness could be result of the DVTs  On 11/26/17 EKG showed ST elevations in III and aVF  In comparison to EKG from a day prior, patient has worsening ST elevation in III and new elevation in aVF  Also reciprocal ST depressions in aVL  On 11/27 it was noted that the MRI was negative for acute ischemia  CTA did demonstrate multifocal areas of stenosis  During hospital course there was some discussion regarding both anticoagulation as well as antiplatelet therapy and what the recommended dose/regimen was recommended  On 11/30/17 patients hemoglobin dropped to 6 5 from 7 6 the day prior and received 1 unit of PRBCS  On 12/1 patients hemoglobin dropped again from 8 4 to 7 9  Hemoglobin continued to be monitored and general surgery continued to follow for worsening hematoma of the right hip and thigh  At this time patients hemoglobin is 8 2 and attending has determined that patient is medically stable for discharge to acute rehab  Patient also had an ICD placed during hospitalization on 12/4  At this time patient is demonstrating the ability to participate in an intensive rehab program, and inpatient acute rehab is being recommended  Past Medical History:   Past Surgical History:    Allergies:     Past Medical History:   Diagnosis Date    CAD (coronary artery disease)     [facility]     HLD (hyperlipidemia)     HTN (hypertension)     Past Surgical History:   Procedure Laterality Date    HIP HARDWARE REMOVAL      TOTAL HIP ARTHROPLASTY Right      Allergies   Allergen Reactions    Celecoxib     Hydromorphone     Pravastatin Hives         Comorbidities: hypertension, DM II, hyperlipidemia, CAD, coagulopathy, acute DVT of brachial vein of the LUE    CURRENT VITAL SIGNS:   Temp:  [98 2 °F (36 8 °C)-98 9 °F (37 2 °C)] 98 9 °F (37 2 °C)  HR:  [67-71] 67  Resp:  [16-18] 16  BP: (154-169)/(71-74) 169/74   Intake/Output Summary (Last 24 hours) at 12/05/17 1116  Last data filed at 12/05/17 1021   Gross per 24 hour   Intake              727 ml   Output             2300 ml   Net            -1573 ml        LABORATORY RESULTS:      Lab Results   Component Value Date    HGB 8 6 (L) 12/05/2017    HCT 26 5 (L) 12/05/2017    WBC 8 00 12/05/2017     Lab Results   Component Value Date    BUN 22 12/05/2017    BUN 29 (H) 12/01/2015     12/05/2017     12/01/2015    K 4 3 12/05/2017    K 4 2 12/01/2015     12/05/2017     12/01/2015    GLUCOSE 129 12/05/2017    GLUCOSE 171 (H) 11/24/2017    GLUCOSE 141 (H) 12/01/2015    CREATININE 1 35 (H) 12/05/2017    CREATININE 1 67 (H) 12/01/2015     Lab Results   Component Value Date    PROTIME 13 6 12/05/2017    INR 1 04 12/05/2017        DIAGNOSTIC STUDIES:  Ct Chest Abdomen Pelvis Wo Contrast    Result Date: 11/24/2017  Impression: 1  Soft tissue swelling and patchy gas adjacent to the right hip compatible with recent surgery as per history  Collection in the right hip lateral  subcutaneous tissues measures 4 6 x 2 8 cm, slightly hyperdense, likely a small postoperative collection/hematoma with small droplets of air  This could be followed with ultrasound if there is clinical concern for interval enlargement  2   2 7 cm hypodense lesion in the left kidney middle pole posteriorly does not measure simple fluid attenuation  This could be a result of streak artifact  Recommend follow-up with renal ultrasound nonemergently  3   Tiny amount of air in the bladder lumen, nonspecific could be related to recent catheterization, underlying infection is not excluded  Correlate clinically  4   Focal skin thickening noted anterior to the sternum just right of midline measuring up to 1 6 cm  Correlate clinically for skin lesion    Rounded cyst more inferiorly anterior to the sternum measuring up to 2 2 cm possibly a sebaceous cyst  Workstation performed: [id number]     Xr Hip/pelv 2-3 Vws Right If Performed    Result Date: 11/25/2017  Impression: Bilateral hip replacement as above  No acute abnormality evident  Workstation performed: [id number]     Ct Head Wo Contrast    Result Date: 11/25/2017  Impression: No acute intracranial abnormality  Stable CT appearance of the brain  Age-appropriate volume loss, advanced chronic small vessel disease  Workstation performed: [id number]     Ct Head Wo Contrast    Result Date: 11/24/2017  Impression: No acute intracranial abnormality  Stable head CT from earlier the same day  Workstation performed: [id number]     Mri Brain Wo Contrast    Result Date: 11/25/2017  Impression: No acute disease  Diffuse generalized volume loss, moderate degree of chronic small vessel disease  Workstation performed: [id number]     Xr Stroke Alert Portable Chest    Result Date: 11/24/2017  Impression: No active pulmonary disease  Workstation performed: [id number]     Ct Stroke Alert Brain    Result Date: 11/24/2017  Impression: No acute intracranial hemorrhage seen Moderate periventricular and white matter hypodensity seen likely due to chronic small vessel ischemic changes Findings were directly discussed with Meka Goode on 11/24/2017 10:20 AM  Workstation performed: [id number]     Us Kidney And Bladder    Result Date: 11/28/2017  Impression: Stable left renal cyst that appears simple   Workstation performed: [id number]     Us Extremity Soft Tissue    Result Date: 11/24/2017  Impression: No obvious focal fluid collections could be confirmed on this exam  Recommend interval follow-up CT if there is clinical concern for enlarging hematoma Or abscess Workstation performed: [id number]     Cta Stroke Alert (head/neck)    Result Date: 11/24/2017  Impression: There is no large vessel occlusion noted within the intracranial circulation There is a small focal area of foot severe stenosis at the region of the inferior division of the right MCA with narrowing in the range of  90% There is moderate to severe atherosclerotic disease with the narrowing in the proximal to midportion of the basilar artery There is lack of enhancement noted within the small segment of the distal cervical portion of the right vertebral artery with the markedly decreased caliber of the visualized portion of the cervical vertebral artery  This may be due to total or subtotal  occlusion of the right vertebral artery   No significant carotid stenosis Central canal stenosis of the cervical spine at C3-4 and C4-5 level  I personally discussed this study with Jose Angel King on 11/24/2017 10:33 AM   I personally discussed this study with Dr Dalton Jennings on 11/24/2017 11:10 AM   Workstation performed: [id number]       PRECAUTIONS/SPECIAL NEEDS:  Tobacco:   History   Smoking Status    Never Smoker   Smokeless Tobacco    Never Used   , Alcohol:    History   Alcohol Use No   , Total Hip Precautions, Weight Bearing Precautions:  WBAT to RLE, Anticoagulation:  warfarin and lovenox, Blood Sugar Management: per MD recommendations, Edema Management, Safety Concerns, Pain Management, IV: Type:peripheral Location: right antecubital Reason: medications and fluids, Dietary Restrictions: Dysphagia 3 Dental Soft, Thin liquids, Language Preference: English, hip precautions, and fall precautions    MEDICATIONS:     Current Facility-Administered Medications:     acetaminophen (TYLENOL) tablet 975 mg, 975 mg, Oral, Q8H [address], Schuyler Carey, DO, 975 mg at 12/05/17 0555    amLODIPine (NORVASC) tablet 10 mg, 10 mg, Oral, Daily, Irving Lovell DO, 10 mg at 12/05/17 0845    aspirin chewable tablet 81 mg, 81 mg, Oral, Daily, Katerina Ha MD, 81 mg at 12/05/17 0845    doxazosin (CARDURA) tablet 4 mg, 4 mg, Oral, Daily, Tivis Block, DO, 4 mg at 12/05/17 0845    enoxaparin (LOVENOX) subcutaneous injection 105 mg, 1 mg/kg, Subcutaneous, Q12H [address], Katerina Ha MD, 105 mg at 36/75/35 9538    folic acid-pyridoxine-cyanocobalamin 2 5-25-2 mg per tablet 1 tablet, 1 tablet, Oral, Daily, Paul Don MD, 1 tablet at 12/05/17 0845    insulin glargine (LANTUS) subcutaneous injection 10 Units, 10 Units, Subcutaneous, HS, Elicia aMza DO, 10 Units at 12/04/17 2104    insulin lispro (HumaLOG) 100 units/mL subcutaneous injection 1-6 Units, 1-6 Units, Subcutaneous, HS, Precious Pardo DO, 2 Units at 12/04/17 2104    insulin lispro (HumaLOG) 100 units/mL subcutaneous injection 2-12 Units, 2-12 Units, Subcutaneous, TID AC, 4 Units at 12/05/17 1108 **AND** Fingerstick Glucose (POCT), , , 4x Daily AC and at bedtime, Precious Pardo DO    lisinopril (ZESTRIL) tablet 40 mg, 40 mg, Oral, Daily, Elicia Maza DO, 40 mg at 12/05/17 0845    metoprolol (LOPRESSOR) injection 5 mg, 5 mg, Intravenous, Q6H PRN, JALEEL Reese, 5 mg at 11/27/17 1126    metoprolol tartrate (LOPRESSOR) tablet 25 mg, 25 mg, Oral, Q12H [address], Iva Krasa, DO, 25 mg at 12/05/17 0845    ondansetron (ZOFRAN) injection 4 mg, 4 mg, Intravenous, Q4H PRN, Precious Pardo DO    polyethylene glycol (MIRALAX) packet 17 g, 17 g, Oral, Daily PRN, Irving Lovell DO, 17 g at 11/30/17 1037    polyethylene glycol (MIRALAX) packet 17 g, 17 g, Oral, Daily, Nabeel Ni MD, 17 g at 12/05/17 0845    warfarin (COUMADIN) tablet 5 mg, 5 mg, Oral, Daily (warfarin), Nabeel Ni MD, 5 mg at 12/04/17 1745    SKIN INTEGRITY:   right hip incision    PRIOR LEVEL OF FUNCTION:  He lives in [facility]  [place] is  and lives with their spouse  Self Care: Independent, Indoor Mobility: Independent, Stairs (in/outdoor): Independent and Cognition: Independent    Patient was independent with medication management and independent with all home making  HOME ENVIRONMENT:  The living area: can live on one level  There 4 steps to enter the home  The patient will not have 24 hour supervision/physical assistance available upon discharge      Patient does live with his wife, who is in good health and supportive  PREVIOUS DME:  Equipment in home (previous DME): Rolling Walker and Single [employer]    FUNCTIONAL STATUS:  Physical Therapy Occupational Therapy Speech Therapy   12/4/17    Transfers   Sit to Stand 4  Minimal assistance   Additional items Assist x 1; Armrests; Increased time required;Verbal cues   Stand to Sit 4  Minimal assistance   Additional items Assist x 1; Armrests; Increased time required;Verbal cues   Ambulation/Elevation   Gait pattern Improper Weight shift; Antalgic; Forward Flexion;Decreased foot clearance;Decreased R stance; Excessively slow   Gait Assistance 4  Minimal assist   Additional items Assist x 1;Assist x 2;Verbal cues  (second for chair follow and safety)   Assistive Device Rolling walker   Distance 30'x2 with seated rest between   Stair Management Assistance Not tested   Balance   Static Sitting Good   Static Standing Fair  (with support of rw)   Ambulatory Fair -  (with support of rw)   Activity Tolerance   Activity Tolerance Patient limited by fatigue;Patient limited by pain   Nurse Made Aware nurse An notified of pt's mobility status   Exercises   Hip Abduction Sitting;15 reps;AROM; Bilateral   Hip Adduction Sitting;15 reps;AROM; Bilateral   Knee AROM Long Arc Quad Sitting;15 reps;AROM; Bilateral   Ankle Pumps Sitting;15 reps;AROM; Bilateral   Marching Sitting;15 reps;AAROM;AROM; Bilateral  (aarom rle and arom lle)   Assessment   Prognosis Good   Problem List Decreased strength;Decreased range of motion;Decreased endurance; Impaired balance;Decreased mobility;Pain   Assessment Pt standing in br with pca and restorative technician upon my arrival   Pt able to increase ambulation distance to 30'x2 with rw and min assist with second for safety/chair follow  Pt reports inability to attain full upright posture due to prior back injury  Pt required seated rest between gt trials due to fatigue/pain   Pt seated on bedside recliner and performed a/aarom rle and arom lle with instruction for technique  Reviewed thp with pt as he was unable to recall independently  Pt remained on recliner with le elevated and chair alarm activated upon completion of session  Ice to posterior right knee/thigh  Pt will continue to benefit from rehab at d/c      12/5/17    ADL   Where Assessed Chair   LB Bathing Assistance 3  Moderate Assistance   LB Bathing Deficit Setup;Verbal cueing;Supervision/safety; Increased time to complete;Right lower leg including foot; Left lower leg including foot   LB Dressing Assistance 4  Minimal Assistance   LB Dressing Deficit Setup;Verbal cueing; Increased time to complete; Don/doff R sock; Don/doff L sock; Use of adaptive equipment   Transfers   Sit to Stand 4  Minimal assistance   Additional items Assist x 1; Armrests; Increased time required;Verbal cues   Stand to Sit 4  Minimal assistance   Additional items Assist x 1; Armrests; Increased time required;Verbal cues   Cognition   Overall Cognitive Status [facility]   Arousal/Participation Alert; Cooperative   Attention Attends with cues to redirect   Orientation Level Oriented X4   Memory Decreased recall of precautions   Following Commands Follows one step commands with increased time or repetition   Activity Tolerance   Activity Tolerance Patient tolerated treatment well   Assessment   Assessment Patient participated in skilled OT with focus on ADL skill training, activity endurance/tolerance, use of adaptive devices for LB dressing, TH precaution review, transfer skill training  Patient would benefit from short term rehab with focus on increasing functional strength, carryover of TH precautions, use of adaptive devices for carryover in his own enviroment  11/29/17    Subjective:  "I don't like pizza or salad, but I like bagels and steak would want one some times" re: desire for dense solid foods     Objective:  Pt was assessed with cottage cheese and hard cookies (pt had already consumed multiple soft fruits)   Pt demonstrated functional mastication  No significant oral retention noted today  Swallows were prompt and strong  No s/s of pharyngeal dysphagia noted  No s/s of aspiration noted even with successive sips of thin liquids  SLP educated pt on diet options in this facility  Pt stated "Ok to say regular diet and I pick my food  "   Assessment:  Pt tolerated cookies andthin liquids c no significant s/s oral or pharyngeal dysphagia  Pt does prefer some soft foods, but agreed to "regular" textured food so he can choose from full menu        Plan/Recommendations:  Consider upgrade to regular textured food and thin liquid, offer menus so pt can select  CURRENT GAP IN FUNCTION     Prior to Admission:     Functional Status: Patient was independent with mobility/ambulation, transfers, ADL's, IADL's  Estimated length of stay: 10 to 14 days    Anticipated Post-Discharge Disposition/Treatment  Disposition: Return to previous home/apartment  Outpatient Services: Physical Therapy (PT) and Occupational Therapy (OT)    BARRIERS TO DISCHARGE  Lovenox, Weakness, Pain, Balance Difficulty, Fatigue, Home Accessibility, Caregiver Accessibility, Financial Resources, Equipment Needs and Resource Availability    INTERVENTIONS FOR DISCHARGE  Adaptive equipment, Patient/Family/Caregiver Education, Community Resources, Financial Assistance, Arrange DME needs, Home Modifcations, Medication Changes per MD recommendations and Therapy exercises    REQUIRED THERAPY:  Patient will require PT and OT 90 minutes each per day, five days per week to achieve rehab goals  REQUIRED FUNCTIONAL AND MEDICAL MANAGEMENT FOR INPATIENT REHABILITATION:  Skin:  incision to the right hip, Deep Vein Thrombosis (DVT) Prophylaxis:  heparin and lovenox, Diabetes Management: continue sliding scale insulin, patient to do finger sticks as ordered, SLIM to continue to manage diabetes, and diet, and futher SLIM management of additional medical conditions while on the ARC    PT/OT intervention, patient/family education and training, and any needed consults PRN    RECOMMENDED LEVEL OF CARE:  Patient is a 80year old male who presented to [facility] after being transferred from [address] [place] [address] [facility]  Patient was receiving therapy for a left hip replacement when patient was thought to have had a stroke  Patient did receive TPA in the ED  Imaging did not show any acute infarcts, however patient does still continue with left sided deficits  Prior to admission patient was fully independent with both ADL's and IADL's as well as med management and homemaking  Currently patient is a min assist with both transfers and ambulation  He requires mod assist with LB ADLs  At this time close medical management and PM&R management is recommended for this patient while on the ARC to help monitor labs as well as other medical conditions  Nursing management will be required to monitor bowel/bladder function to prevent incontinent episodes and skin breakdown as well as education on medication changes  Inpatient acute rehab is recommended at this time for patient to maximize overall strength, endurance, self care, and mobility upon discharge to home with the support of his family

## 2017-12-05 NOTE — OCCUPATIONAL THERAPY NOTE
Occupational Therapy Treatment Note:       12/05/17 1012   Pain Assessment   Pain Assessment 0-10   Pain Score 5   Pain Type Acute pain;Surgical pain   Pain Location Leg   Pain Orientation Right   ADL   Where Assessed Chair   LB Bathing Assistance 3  Moderate Assistance   LB Bathing Deficit Setup;Verbal cueing;Supervision/safety; Increased time to complete;Right lower leg including foot; Left lower leg including foot   LB Dressing Assistance 4  Minimal Assistance   LB Dressing Deficit Setup;Verbal cueing; Increased time to complete; Don/doff R sock; Don/doff L sock; Use of adaptive equipment   Transfers   Sit to Stand 4  Minimal assistance   Additional items Assist x 1; Armrests; Increased time required;Verbal cues   Stand to Sit 4  Minimal assistance   Additional items Assist x 1; Armrests; Increased time required;Verbal cues   Cognition   Overall Cognitive Status [facility]   Arousal/Participation Alert; Cooperative   Attention Attends with cues to redirect   Orientation Level Oriented X4   Memory Decreased recall of precautions   Following Commands Follows one step commands with increased time or repetition   Activity Tolerance   Activity Tolerance Patient tolerated treatment well   Assessment   Assessment Patient participated in skilled OT with focus on ADL skill training, activity endurance/tolerance, use of adaptive devices for LB dressing, TH precaution review, transfer skill training  Patient would benefit from short term rehab with focus on increasing functional strength, carryover of TH precautions, use of adaptive devices for carryover in his own enviroment  Plan   Treatment Interventions ADL retraining;Functional transfer training;UE strengthening/ROM; Endurance training;Equipment evaluation/education; Compensatory technique education   Goal Expiration Date 12/06/17   Treatment Day 6   OT Frequency 5x/wk   Recommendation   OT Discharge Recommendation Short Term Rehab   OT - OK to Discharge Yes  (when medically cleared) Barthel Index   Feeding 10   Bathing 0   Grooming Score 0   Dressing Score 5   Bladder Score 10   Bowels Score 10   Toilet Use Score 5   Transfers (Bed/Chair) Score 10   Mobility (Level Surface) Score 5   Stairs Score 0   Barthel Index Score 55   Modified Yaniv Scale   Modified Jefferson Scale 4   QUETA [place]

## 2017-12-05 NOTE — PLAN OF CARE
Problem: OCCUPATIONAL THERAPY ADULT  Goal: Performs self-care activities at highest level of function for planned discharge setting  See evaluation for individualized goals  Treatment Interventions: ADL retraining, Functional transfer training, UE strengthening/ROM, Endurance training          See flowsheet documentation for full assessment, interventions and recommendations  Outcome: Progressing  Limitation: Decreased ADL status, Decreased UE strength, Decreased Safe judgement during ADL, Decreased endurance, Decreased fine motor control, Decreased self-care trans  Prognosis: Good  Assessment: Patient participated in skilled OT with focus on ADL skill training, activity endurance/tolerance, use of adaptive devices for LB dressing, TH precaution review, transfer skill training  Patient would benefit from short term rehab with focus on increasing functional strength, carryover of TH precautions, use of adaptive devices for carryover in his own enviroment     Recommendation: PT consult  OT Discharge Recommendation: Short Term Rehab  OT - OK to Discharge: Yes (when medically cleared)   Noemy Chavarria

## 2017-12-06 ENCOUNTER — GENERIC CONVERSION - ENCOUNTER (OUTPATIENT)
Dept: OTHER | Facility: OTHER | Age: 82
End: 2017-12-06

## 2017-12-06 LAB
25(OH)D3 SERPL-MCNC: 20.4 NG/ML (ref 30–100)
GLUCOSE SERPL-MCNC: 125 MG/DL (ref 65–140)
GLUCOSE SERPL-MCNC: 160 MG/DL (ref 65–140)
GLUCOSE SERPL-MCNC: 187 MG/DL (ref 65–140)
GLUCOSE SERPL-MCNC: 208 MG/DL (ref 65–140)
HCT VFR BLD AUTO: 25.6 % (ref 36.5–49.3)
HGB BLD-MCNC: 8.3 G/DL (ref 12–17)
INR PPP: 1.12 (ref 0.86–1.16)
PROTHROMBIN TIME: 14.4 SECONDS (ref 12.1–14.4)

## 2017-12-06 PROCEDURE — 97535 SELF CARE MNGMENT TRAINING: CPT

## 2017-12-06 PROCEDURE — 82948 REAGENT STRIP/BLOOD GLUCOSE: CPT

## 2017-12-06 PROCEDURE — 97530 THERAPEUTIC ACTIVITIES: CPT

## 2017-12-06 PROCEDURE — 82306 VITAMIN D 25 HYDROXY: CPT | Performed by: PHYSICAL MEDICINE & REHABILITATION

## 2017-12-06 PROCEDURE — 97167 OT EVAL HIGH COMPLEX 60 MIN: CPT

## 2017-12-06 PROCEDURE — 97110 THERAPEUTIC EXERCISES: CPT

## 2017-12-06 PROCEDURE — 97162 PT EVAL MOD COMPLEX 30 MIN: CPT

## 2017-12-06 PROCEDURE — 85610 PROTHROMBIN TIME: CPT | Performed by: PHYSICAL MEDICINE & REHABILITATION

## 2017-12-06 PROCEDURE — 85014 HEMATOCRIT: CPT | Performed by: PHYSICAL MEDICINE & REHABILITATION

## 2017-12-06 PROCEDURE — 85018 HEMOGLOBIN: CPT | Performed by: PHYSICAL MEDICINE & REHABILITATION

## 2017-12-06 PROCEDURE — 92610 EVALUATE SWALLOWING FUNCTION: CPT

## 2017-12-06 RX ORDER — ERGOCALCIFEROL 1.25 MG/1
50000 CAPSULE ORAL WEEKLY
Status: DISCONTINUED | OUTPATIENT
Start: 2017-12-06 | End: 2017-12-18 | Stop reason: HOSPADM

## 2017-12-06 RX ORDER — MELATONIN
1000 DAILY
Status: DISCONTINUED | OUTPATIENT
Start: 2018-01-17 | End: 2017-12-18

## 2017-12-06 RX ADMIN — POLYETHYLENE GLYCOL 3350 17 G: 17 POWDER, FOR SOLUTION ORAL at 09:23

## 2017-12-06 RX ADMIN — LISINOPRIL 40 MG: 20 TABLET ORAL at 09:23

## 2017-12-06 RX ADMIN — AMLODIPINE BESYLATE 10 MG: 10 TABLET ORAL at 09:23

## 2017-12-06 RX ADMIN — ENOXAPARIN SODIUM 105 MG: 120 INJECTION SUBCUTANEOUS at 21:10

## 2017-12-06 RX ADMIN — ASPIRIN 81 MG 324 MG: 81 TABLET ORAL at 09:23

## 2017-12-06 RX ADMIN — INSULIN LISPRO 1 UNITS: 100 INJECTION, SOLUTION INTRAVENOUS; SUBCUTANEOUS at 21:12

## 2017-12-06 RX ADMIN — ACETAMINOPHEN 975 MG: 325 TABLET, FILM COATED ORAL at 21:11

## 2017-12-06 RX ADMIN — INSULIN LISPRO 1 UNITS: 100 INJECTION, SOLUTION INTRAVENOUS; SUBCUTANEOUS at 16:53

## 2017-12-06 RX ADMIN — METOPROLOL TARTRATE 50 MG: 50 TABLET ORAL at 09:20

## 2017-12-06 RX ADMIN — INSULIN LISPRO 1 UNITS: 100 INJECTION, SOLUTION INTRAVENOUS; SUBCUTANEOUS at 11:48

## 2017-12-06 RX ADMIN — ENOXAPARIN SODIUM 105 MG: 120 INJECTION SUBCUTANEOUS at 09:33

## 2017-12-06 RX ADMIN — ACETAMINOPHEN 975 MG: 325 TABLET, FILM COATED ORAL at 15:08

## 2017-12-06 RX ADMIN — ERGOCALCIFEROL 50000 UNITS: 1.25 CAPSULE ORAL at 15:10

## 2017-12-06 RX ADMIN — ACETAMINOPHEN 975 MG: 325 TABLET, FILM COATED ORAL at 05:16

## 2017-12-06 RX ADMIN — WARFARIN SODIUM 5 MG: 5 TABLET ORAL at 18:42

## 2017-12-06 RX ADMIN — INSULIN GLARGINE 10 UNITS: 100 INJECTION, SOLUTION SUBCUTANEOUS at 21:12

## 2017-12-06 RX ADMIN — DOXAZOSIN 4 MG: 4 TABLET ORAL at 09:23

## 2017-12-06 RX ADMIN — METOPROLOL TARTRATE 50 MG: 50 TABLET ORAL at 21:13

## 2017-12-06 RX ADMIN — FOLIC ACID-PYRIDOXINE-CYANOCOBALAMIN TAB 2.5-25-2 MG 1 TABLET: 2.5-25-2 TAB at 09:23

## 2017-12-06 NOTE — PCC OCCUPATIONAL THERAPY
Pt overall presenting with dec safety, dec balance, poor insight into deficits, and limited strength  Pt participated in cognitive assessments and Pt scoring moderately impaired on MoCA 12/30 and 4 4/6 0 on ACLS indicating need for 24/7 S and A upon d/c  Pt at this time does NOT have 24/7 S and his family unable to provide  Pt does not demo safe behaviors with ADL fxn and management of RW

## 2017-12-06 NOTE — PROGRESS NOTES
12/06/17 1230   Patient Data   Rehab Impairment Decline in functional mobility , safety and ADLs due to debility s/p MAREN   Etiologic Diagnosis Multiple medical problems, DVT/TIA/recent hip surgery   Date of Onset 11/24/17   Home Setup   Type of Home Single Level   Method of Entry Stairs   Number of Stairs 4   Number of Stairs in Home 3  (2 steps down to kitchen, 1 step up to bedroom )   In Home [facility] (none)   First Floor Setup Available Yes   Available Equipment Roller Walker   Prior Level of Function   Self-Care 3  Independent - Patient completed the activities by him/herself, with or without an assistive device, with no assistance from a helper  Indoor-Mobility (Ambulation) 3  Independent - Patient completed the activities by him/herself, with or without an assistive device, with no assistance from a helper  Stairs 3  Independent - Patient completed the activities by him/herself, with or without an assistive device, with no assistance from a helper  Functional Cognition 2  Needed Some Help - Patient needed a partial assistance from another person to complete activities  Psychosocial   Psychosocial (WDL) WDL   Restrictions/Precautions   Precautions THR; Fall Risk;Cognitive;Bed/chair alarms   Weight Bearing Restrictions No   RLE Weight Bearing Per Order WBAT   Pain Assessment   Pain Assessment 0-10   Pain Score 6   Pain Type Surgical pain;Acute pain   Pain Location Hip   Pain Orientation Right   Pain Descriptors Aching;Tightness   Pain Frequency Constant/continuous   Pain Onset (worse with activity 8/10)   Hospital Pain Intervention(s) Cold applied; Rest   Response to Interventions 4/10   QI: Roll Left and Right   Assistance Needed Physical assistance;Verbal cues   Assistance Provided by [place] 50%-74%   Roll Left and Right CARE Score 2   QI: Sit to Lying   Assistance Needed Physical assistance;Verbal cues   Assistance Provided by [place] 25%-49%   Sit to Lying CARE Score 3   QI: Lying to Sitting on Side of Bed   Assistance Needed Physical assistance;Supervision   Assistance Provided by [place] 50%-74%   Lying to Sitting on Side of Bed CARE Score 2   QI: Sit to Stand   Assistance Needed Physical assistance;Verbal cues   Assistance Provided by [place] 25%-49%   Sit to Stand CARE Score 3   QI: Chair/Bed-to-Chair Transfer   Assistance Needed Physical assistance; Adaptive equipment   Assistance Provided by [place] 25%-49%   Chair/Bed-to-Chair Transfer CARE Score 3   QI: Car Transfer   Assistance Needed Physical assistance; Adaptive equipment   Assistance Provided by [place] 50%-74%   Car Transfer CARE Score 2   Transfer Bed/Chair/Wheelchair   Limitations Noted In Problem Solving; Endurance;Balance;LE Strength   Adaptive Equipment Roller [employer], Chair, Wheelchair Transfer (FIM) 2 - [place] needs to lift or boost to rise AND assist to sit   QI: Walk 10 Feet   Assistance Needed Physical assistance;Verbal cues; Adaptive equipment   Assistance Provided by [place] Less than 25%   Walk 10 Feet CARE Score 3   QI: Walk 50 Feet with Two Turns   Assistance Needed Physical assistance; Adaptive equipment   Assistance Provided by [place] Less than 25%   Walk 50 Feet with Two Turns CARE Score 3   QI: Walk 150 Feet   Comment fatigued at 65ft   Reason if not Attempted Activity not applicable   Walk 586 Feet CARE Score 9   QI: Walking 10 Feet on Uneven Surfaces   Assistance Needed Physical assistance; Adaptive equipment   Assistance Provided by [place] 25%-49%   Comment ramp with RW   Walking 10 Feet on Uneven Surfaces CARE Score 3   Ambulation   Does the patient walk? 2  Yes   Primary Discharge Mode of Locomotion Walk   Walk Assist Level Contact Guard;Minimum Assist   Gait Pattern Improper weight shift;Decreased R stance; Step to; Slow Ally; Forward Flexion   Assist Device Suze Harrington Walked (feet) 65 ft   Limitations Noted In Strength;Speed;Posture; Heel Strike; Endurance;Balance   Findings pt relies heavily on B UE support, limited by fatigue and pain R LE  Pt R Leg ER throughout   Walking (FIM) 2 - Patient ambulates between 50 - 149 feet regardless of assist/device/set up   Wheelchair mobility   QI: Does the patient use a wheelchair? 0  No   QI: 1 Step (Curb)   Assistance Needed Physical assistance; Adaptive equipment   Assistance Provided by [place] 25%-49%   1 Step (Curb) CARE Score 3   QI: 4 Steps   Comment fatigued after 2 steps   Reason if not Attempted Activity not applicable   4 Steps CARE Score 9   QI: 12 Steps   Reason if not Attempted Activity not applicable   12 Steps CARE Score 9   Stairs   Type Stairs; Steps   # of Steps 2   Weight Bearing Precautions WBAT;Fall Risk   Assist Devices Bilateral Rail   Findings leading with L LE   Stairs (FIM) 1 - Patient goes up and down less than 4 stairs regardless of assist/device/set up   Comprehension   Comprehension (FIM) 5 - Understands basic directions and conversation   Expression   Expression (FIM) 5 - Needs help/cues only RARELY (< 10% of the time)   Social Interaction   Social Interaction (FIM) 5 - Interacts appropriately with others 90% of time   Problem Solving   Problem solving (FIM) 3 - Solves basic problmes 50-74% of time   Memory   Memory (FIM) 4 - Recognizes/recalls/performs 75-89%   RLE Assessment   RLE Assessment X  (Pt with tightness noted HS, hip flexors an ER)   Strength RLE   R Hip Flexion 3/5   R Hip Extension 3/5   R Hip ABduction 2+/5   R Knee Extension 3/5   R Ankle Dorsiflexion 3+/5   LLE Assessment   LLE Assessment WFL   Cognition   Overall Cognitive Status Impaired   Discharge Information   Patient's Discharge Plan to return home   Patient's Rehab Expectations to have less pain, get stronger and return home as ind as possible   Barriers to Discharge Home Decreased Endurance;Decreased Cognitive Function;Decreased Strength; Safety Considerations;Pain;Limited Family Support   Impressions Pt presents s/p R MAREN and medical complications after with decresaed strength, decreased balance, delcine in activity tolerance and overall functional mobility  Pt reports he had been ambulating with single axillary crutch prior, relys heavily on B UE support now from  for all mobility  Pt with fwd fed paosture,  R LE externally rotated throughout  Incresaed pain with activity, yet able to participate with all  As pt fatigues he becomes higher risk for falling due to poor righting reactions and weakness  Pt with hematoma R hip/thigh, using ice pack throughout the day  Tolerated 10 min on nustep and light ther ex in supine and sitting  Pt requires a to lift leg in/out of bed and incresaed A to stand as he fatigues  Pt also with incresaed retropulsion with fatigue  Pt's greatest barriers jessica be stairs in home w/o HR and overall weakness and deconditioning  Good rehab potential for achieving Sup level for all moility using      PT Therapy Minutes   PT Time In 1230   PT Time Out 1400   PT Total Time (minutes) 90   PT Mode of treatment - Individual (minutes) 90   PT Mode of treatment - Concurrent (minutes) 0   PT Mode of treatment - Group (minutes) 0   PT Mode of treatment - Co-treat (minutes) 0   PT Mode of Teatment - Total time(minutes) 90 minutes

## 2017-12-06 NOTE — PCC NURSING
Admit to [facility] 12/05  cva status post hardware repair in R hip from fall  Was at [facility] post surgery and developed L sided weakness, facial droop and slurred speech  Sent to our ER and given TPA then developed hematoma in R hip hip  requiring blood trasfusions  DVT L brachial vein this admit  Hip insicion has staples- MAHESH  Pt uses Abd wedge in bed  Pain managed with tylenol and tramadol  No grasp with L hand  Pt is diabetic, managed with l humalog coverage and QID sugar checks  This week we will continue to monitor labs &  vital signs  We will work on pain management and diabetic management  We will prevent skin breakdown and monitor incision  Pt will work on safety with transfers and remain free from falls

## 2017-12-06 NOTE — PLAN OF CARE
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]  Transient ischemic attack (TIA) [G45 9]  EXPECTED LOS: 1-2 wks     MEDICAL/FUNCTIONAL PROGNOSIS  Based on my assessment of the patient's medical conditions and current functional status, the prognosis for attaining medical and functional goals or the IRF stay is:  Fair     Medical Goals: anticoagulate to therapeutic INR     ANTICIPATED DISCHARGE DISPOSITION AND SERVICES  COMMUNITY SETTING: PT/OT/speech       ANTICIPATED FOLLOW-UP SERVICE:   Outpatient Therapy Services: PT/OT/speech     DISCIPLINE SPECIFIC PLANS:  Required Disciplines & Services: PT/OT/speech     REQUIRED THERAPY:  Therapy Hours per Day Days per Week Total Days   Physical Therapy 1 5 10   Occupational Therapy 1 5 10   Speech/Language Therapy 1 5 10   NOTE: Additional therapy time(s) may be added as appropriate to meet patient needs and to achieve functional goals        ANTICIPATED FUNCTIONAL OUTCOMES:  ADL: Patient will require supervision with ADLs with least restrictive device upon completion of rehab program   Bladder/Bowel: Patient will return to premorbid level for bladder/bowel management upon completion of rehab program   Transfers:   Patient will be modified independent upon completion of rehab program   Locomotion:   Patient will be modified independent upon completion of rehab program   Cognitive: Patient will be independent for basic  tasks and require supervision for complex tasks upon completion of rehab program     DISCHARGE PLANNING NEEDS  Equipment needs: tbd       REHAB ANTICIPATED PARTICIPATION RESTRICTIONS:  None

## 2017-12-06 NOTE — PROGRESS NOTES
SARAH GREWAL   12/06/17 0830   Patient Data   Rehab Impairment Debility   Etiologic Diagnosis Multiple Medical Problems: DVT, TIA, HEMATOMA, RECENT HIP SX   Date of Onset 11/24/17   Home Setup   Type of Home Mobile Home   Method of Entry Stairs   Number of Stairs 4   Number of Stairs in Home 2  (curb steps to/from kitchen living room)   First Floor Bathroom Full; Shower   First Floor Setup Available Yes   Home Modifications Necessary? Yes   Home Modification Comment grab bars, shower seat   Available Equipment Roller Walker;Single [employer]   Prior IADL Participation   Money Management Identify Money;Combine Bills;Manage Checkbook;Estimate Change;Estimate Costs   Meal Preparation Partial Participation   Home Cleaning (shared IADL fxn with girlfriend)   Prior Level of Function   Self-Care 3  Independent - Patient completed the activities by him/herself, with or without an assistive device, with no assistance from a helper  Indoor-Mobility (Ambulation) 3  Independent - Patient completed the activities by him/herself, with or without an assistive device, with no assistance from a helper  Stairs 3  Independent - Patient completed the activities by him/herself, with or without an assistive device, with no assistance from a helper  Functional Cognition 3  Independent - Patient completed the activities by him/herself, with or without an assistive device, with no assistance from a helper  Prior Assistance Needed for (all per pt reports)   Psychosocial   Psychosocial (WDL) WDL   Restrictions/Precautions   Precautions Fall Risk;THR;Hard of hearing;Bed/chair alarms;Aspiration   Pain Assessment   Pain Assessment 0-10   Pain Score 5   Pain Location Hip   Pain Orientation Right   Hospital Pain Intervention(s) Repositioned; Rest   QI: Eating   Assistance Needed Supervision   Eating CARE Score 4   Eating Assessment   Eating (FIM) 5 - Patient requires supervision, cueing or coaxing   QI: Oral Hygiene   Assistance Needed Supervision   Oral Hygiene CARE Score 4   Grooming   Able To Initiate Tasks;Comb/Brush Hair;Wash/Dry Face;Brush/Clean Teeth;Wash/Dry Hands   Limitation Noted In Strength; Safety;Problem Solving   Findings Pt completed grooming tasks at seated level 2* dec balance and strength deficits when in stance  Pt with LOB with reaching and UE release when in stance  Grooming (FIM) 5 - Patient requires supervision/monitoring   QI: Shower/Bathe Self   Assistance Needed Physical assistance   Assistance Provided by [place] 50%-74%   Shower/Bathe Self CARE Score 2   Bathing   Assessed Bath Style Sponge Bath   Anticipated D/C Bath Style Shower   Able to Magalie Sukhdeep No   Able to [employer] Temperature No   Able to Wash/Rinse/Dry (body part) Left Arm;Right Arm;L Upper Leg;R Upper Leg;Chest;Abdomen   Limitations Noted in Balance; Endurance;Problem Solving;ROM;Safety;Strength   Positioning Seated;Standing   Findings  Pt completed sponge bathing routine with cues to adhere to THP  Pt req A for bottom and erick hygiene in stance  Pt with L lateral lean and slight LOB in stance with UE release requiring assist to correct   Bathing (FIM) 3 - Patient completes 5/10  6/10 or 7/10 parts   QI: Upper Body Dressing   Assistance Needed Physical assistance   Assistance Provided by [place] 25%-49%   Upper Body Dressing CARE Score 3   QI: Lower Body Dressing   Assistance Needed Physical assistance   Assistance Provided by [place] 50%-74%   Lower Body Dressing CARE Score 2   QI: Putting On/Taking Off Footwear   Assistance Needed Physical assistance   Assistance Provided by [place] Total assistance   Putting On/Taking Off Footwear CARE Score 1   QI: Picking Up Object   Reason if not Attempted Safety concerns   Picking Up Object CARE Score 88   Dressing/Undressing Clothing   Remove UB Clothes Pullover Shirt   Remove LB Clothes [address]; Undergarment;Socks; Shoes   Limitations Noted In Balance; Endurance;Problem Solving; Safety;Strength;ROM   Positioning Supported Sit;Standing   Findings Pt req cues for carryover of THP with dressing tasks, pt attempting to reach to LEs to assist with LB threading, pt edu on carryover  Pt with b/l UE release req mod A to maintain upright position in stance, Dec problem solving noted  UB Dressing (FIM) 4 - Patient completes 75% of all tasks   LB Dressing (FIM) 3 - Patient completes  50-74% of all tasks   QI: [address] Needed Physical assistance   Assistance Provided by [place] 50%-74%   Toileting Hygiene CARE Score 2   Toileting   Able to [address] down yes, up no  Able to Manage Clothing Closures No   Manage Hygiene Bladder   Findings A for bowel management and clothing management up   Toileting (FIM) 3 - Patient completes  50-74% of all tasks   QI: Lying to Sitting on Side of Bed   Assistance Needed Physical assistance   Assistance Provided by [place] 75% or more   Lying to Sitting on Side of Bed CARE Score 2   QI: Sit to Stand   Assistance Needed Physical assistance   Assistance Provided by [place] 25%-49%   Sit to Stand CARE Score 3   QI: Chair/Bed-to-Chair Transfer   Assistance Needed Physical assistance   Assistance Provided by [place] 25%-49%   Chair/Bed-to-Chair Transfer CARE Score 3   Transfer Bed/Chair/Wheelchair   Findings inc A for bed mobility   Bed, Chair, Wheelchair Transfer (FIM) 2 - Patient completes 25 - 49% of all tasks   QI: Toilet Transfer   Assistance Needed Physical assistance   Assistance Provided by [place] 25%-49%   Toilet Transfer CARE Score 3   Toilet Transfer   Surface Assessed Standard Commode   Transfer Technique Stand Pivot   Findings SPT to [facility]   Toilet Transfer (FIM) 3 - [place] needs to lift, boost or assist to stand OR sit   Tub/Shower Transfer   Not Assessed Balance; Endurance; Safety;Sponge Bath;Patient refusal   Comprehension   QI: Comprehension 3   Usually Understands: Understands most conversations, but misses some part/intent of message  Requires cues at times to understand  Comprehension (FIM) 5 - Understands basic directions and conversation   Expression   QI: Expression 4  Express complex messages without difficulty and with speech that is clear and easy to [place]   Expression (FIM) 5 - Needs help/cues only RARELY (< 10% of the time)   Social Interaction   Social Interaction (FIM) 5 - Needs monitoring/encouragement  to participate/interact   Problem Solving   Problem solving (FIM) 4 - Solves basic problems 75-89% of time   Memory   Memory (FIM) 4 - Recalls 2 of 3 steps   RUE Assessment   RUE Assessment WFL   LUE Assessment   LUE Assessment WFL   Sensation   Light Touch No apparent deficits   Cognition   Overall Cognitive Status Impaired   Arousal/Participation Cooperative   Attention Attends with cues to redirect   Orientation Level Oriented X4   Memory Decreased recall of precautions;Decreased recall of recent events;Decreased short term memory   Following Commands Follows one step commands with increased time or repetition   Comments OT to formally assess   Discharge Information   Vocational Plan Retired/not working   Patient's Discharge Plan to return home with [facility] support   Patient's Rehab Expectations to go home   Barriers to Discharge Home Limited Family Support; Unsafe Home Setup; Decreased Cognitive Function;Decreased Strength;Decreased Endurance; Safety Considerations;Pain   Impressions Pt is 79 y/o male presenting with multiple medical problems including TIA, DVT, hematoma, and s/p R THR  Pt presenting with deficits in endurance, balance, strength, safety, fxnl cognition, and carryover limiting indep with ADL fxn and xfers  Pt reports he has supportive S O and his brother lives nearby  Pt reports there are times during the day where he is home alone  Pt with dec insight into deficits  Unsafe to d/c home at this time  ELOS 2wks to achieve S level fxnl goals and to determine safe d/c plan      OT Therapy Minutes   OT Time In 0830   OT Time Out 1000   OT Total Time (minutes) 90   OT Mode of treatment - Individual (minutes) 90   OT Mode of treatment - Concurrent (minutes) 0   OT Mode of treatment - Group (minutes) 0   OT Mode of treatment - Co-treat (minutes) 0   OT Mode of Teatment - Total time(minutes) 90 minutes

## 2017-12-06 NOTE — SOCIAL WORK
CM evaluation  CM met with pt to review rehab routine and CM role  Lives with wife in one level house, 4STE  Wife does not work, will be able to assist pt on dc  Owns crutches, wheelchair, shower chair, rolling walker and SPC  Pharmacy is [facility] [address], [place]  No previous C  Informed of weekly team meeting and potentail dc needs  Discussed insurance coverage and LOS  IMM signed and placed with pt records  CM will follow to assist with potential dc needs  CM also reviewed weekly team meeting and current functional barriers  Agreeable to reteam next week

## 2017-12-06 NOTE — PCC SPEECH THERAPY
Pt was assessed for dysphagia, where he was on a level 3 diet w/ thin liquids  However, upon assessment, he was then advanced to regular w/ thin liquids due ability to tolerate solids w/o increased oropharyngeal sxs  Brief f/u occurred, where pt continued to demonstrate tolerance of regular/thin liquids at this time  No further SLP services warranted for dysphagia

## 2017-12-06 NOTE — PROGRESS NOTES
12/06/17 1100   Pain Assessment   Pain Assessment 0-10   Pain Score 9   Pain Location Leg   Pain Orientation Right   Pain Onset Ongoing   Hospital Pain Intervention(s) Repositioned;Distraction   Restrictions/Precautions   Precautions Aspiration;Bed/chair alarms; Fall Risk;Hard of hearing;THR;Supervision on toilet/commode   RLE Weight Bearing Per Order WBAT   Speech/Swallow Mechanism Exam   Labial Symmetry WFL   Labial Strength WFL   Labial ROM WFL   Facial Symmetry WFL   Facial Strength WFL   Facial ROM WFL   Lingual Symmetry WFL   Lingual Strength WFL   Lingual ROM WFL   Mandible WFL   Dentition Adequate   Volitional Cough Strong   Volitional Swallow WFL   Swallow Information   Current Risks for Dysphagia & Aspiration Recent intubation;General debilitation;New Neuro event   Current Symptoms/Concerns Clear throat;During meals   Current Diet Dysphagia advance; Thin liquid   Baseline Diet Regular; Thin liquids   Consistencies Assessed and Performance   Materials Admnistered Mechanical Soft/Level 2;Soft/Level 3;Regular/Solid;Puree/Level 1; Thin liquid   Oral Stage Mild impaired   Phargngeal Stage WFL   Swallow Mechanics Mild delayed;Swallow initation;Good [place] rise;Aspiration risk   Recommendations   Diet Solid Recommendation Regular consistency   Diet Liquid Recommendation Thin liquid   Recommended Form of Meds As tolerated   General Precautions Aspiration precautions;Minimize distractions; Feed only when alert;Upright as possible for all oral intake;Remain upright for 45 mins after meals  (OOB for meals)   Compensatory Swallowing Strategies Alternate solids and liquids;Voluntary throat clear/cough to clear penetration   QI: Eating   Assistance Needed Set-up / clean-up   Assistance Provided by [place] No physical assistance   Eating CARE Score 5   Swallow Assessment   Swallow Treatment Assessment Pt was observed c lunch, where current diet is leve 3/thins but agreeable for PO trials of regular textures   Pt was setup c tray and mod I to feed self meal  Consumed 100% of meal and 180cc of thin liquids by straw  Mastication of soft/solids (canned peaches/pears, cottage cheese, peanut butter and jelly sandwich, pistachios, peanuts, almonds and cashews) was mildly slower but functional to clear s oral residual or overt pocketing  No anterior spillage noted c consistencies  A-p transfer of puree (applesauce) and thins by straw was [facility]  Swallow initiation was overall prompt, noting piecemeal swallows c solids  Hyolaryngeal elevation was [facility]  Pt did exhibit throat clear x2 during meal but no overt signs/sxs of aspiration noted during meal  Will recommend to upgrade diet to regular diet/thin liquids at this time  Brief f/u x1-2 sessions to monitor tolerance of diet upgrade s overt s/s of aspiration  Swallow Assessment Prognosis   Prognosis Good   Prognosis Considerations Co-morbidities   SLP Therapy Minutes   SLP Time In 1100   SLP Time Out [employer]   SLP Total Time (minutes) 30   SLP Mode of treatment - Individual (minutes) 30   SLP Mode of treatment - Concurrent (minutes) 0   SLP Mode of treatment - Group (minutes) 0   SLP Mode of treatment - Co-treat (minutes) 0   SLP Mode of Teatment - Total time(minutes) 30 minutes   Therapy Time missed   Time missed?  No   Daily FIM Score   Eating (FIM) 5 - Patient needs help to open contianers or set up tray   SLP Dysphagia Assessment

## 2017-12-06 NOTE — PROGRESS NOTES
Internal Medicine Progress Note  Patient: Yessy Pineda  Age/sex: 80 y o  male  Medical Record #: [id number]      ASSESSMENT/PLAN:  Yessy Pineda is seen and examined and mangement for following issues:    1  TPA aborted CVA vs TIA/multiple areas intracranial atherosclerosis/severe stenosis inferior Rt MCA:  Continue ASA  Resume Plavix once pt completes a 6 month course of anticoagulation for Lt UE DVT  No statin as pt had hives in the past  Why getting an API on 12/13/17? 2   [facility]:  Pain control per primary service  Monitor incision  Follow-up with [facility]  3   Rt hip hematoma: occurred after getting tPA  Hemoglobin stable today    4  Acute blood loss anemia;  s/p multiple transfusions:  Stable  5   Lt UE DVT:  Continue therapeutic Lovenox until INR > 2  Currently on Coumadin 5mg qd  INR in AM   Thrombosis Panel: + lupus anticoagulant, mildly decreased antithrombin III, PTT-LA mixing study prolonged at 53 sec, and hexagonal phase phospholipid elevated at 16  Pt will need Hematology follow-up and recheck lupus anticoagulant in 12 weeks    6  CAD with CONCEPCION:  Continue full ASA  For API 12/13/17    7  HTN: not optimally controlled on amlodipine 10mg daily, Cardura 4mg daily, lisinopril 40mg daily and Lopressor 50mg every 12 hours and may need to add Hydralazine  8   CKD stage III; baseline Cr 1 4-1 5: stable; Will monitor  9   DM type 2:  HbA1C in April 7 3  Pt takes Glyburide 5mg 2 tabs in the AM and 1 tab in the PM   Pt has been on insulin coverage and 10U Lantus at bedtime  Added back glyburide 5mg in the AM to start today  , 206, 199, 199; fasting today 125    10  Vit D deficiency:  Will start 50,000U weekly x 6 and then 1000U/day      Subjective: Patient seen and examined   No new or overnight issues     ROS:   GI: denies abdominal pain, change bowel habits or reflux symptoms  Neuro: No new neurologic changes  Respiratory: No Cough, SOB  Cardiovascular: No CP, palpitations     Scheduled Meds:    acetaminophen 975 mg Oral Q8H [address]   amLODIPine 10 mg Oral Daily   aspirin 324 mg Oral Daily   doxazosin 4 mg Oral Daily   enoxaparin 1 mg/kg Subcutaneous M69B [address]   folic acid-pyridoxine-cyanocobalamin 1 tablet Oral Daily   insulin glargine 10 Units Subcutaneous HS   insulin lispro 1-5 Units Subcutaneous TID AC   insulin lispro 1-5 Units Subcutaneous HS   lisinopril 40 mg Oral Daily   metoprolol tartrate 50 mg Oral Q12H [address]   polyethylene glycol 17 g Oral Daily   warfarin 5 mg Oral Daily (warfarin)       Labs:       Results from last 7 days  Lab Units 12/06/17  0523 12/05/17  1017 12/05/17  0543  12/04/17  0505   WBC Thousand/uL  --   --  8 00  --  7 57   HEMOGLOBIN g/dL 8 3* 8 6* 8 2*  < > 8 4*   HEMATOCRIT % 25 6* 26 5* 24 9*  < > 24 9*   PLATELETS Thousands/uL  --   --  452*  --  413*   < > = values in this interval not displayed      Results from last 7 days  Lab Units 12/05/17  0543 12/01/17  0512   SODIUM mmol/L 136 135*   POTASSIUM mmol/L 4 3 4 4   CHLORIDE mmol/L 105 102   CO2 mmol/L 25 25   BUN mg/dL 22 27*   CREATININE mg/dL 1 35* 1 27   GLUCOSE RANDOM mg/dL 129 127   CALCIUM mg/dL 8 8 8 8           Results from last 7 days  Lab Units 12/06/17  0523 12/05/17  0543   INR  1 12 1 04        Glucose (mg/dL)   Date Value   12/05/2017 129   12/01/2017 127   11/30/2017 258 (H)   11/28/2017 159 (H)   12/01/2015 141 (H)   07/14/2015 124 (H)   09/11/2014 116 (H)   01/09/2014 134 (H)     Glucose, i-STAT (mg/dl)   Date Value   11/24/2017 171 (H)       Labs reviewed    Physical Examination:  Vitals:   Vitals:    12/05/17 1357 12/05/17 2050 12/06/17 0516   BP: 155/76 (!) 172/81 156/76   Pulse: 70 75 70   Resp: 20 20 18   Temp: 98 6 °F (37 °C) 99 3 °F (37 4 °C) 98 9 °F (37 2 °C)   TempSrc: Oral Oral Oral   SpO2: 97% 95% 93%   Weight: 101 kg (223 lb 5 2 oz)     Height: 5' 10" (1 778 m)         GEN: NAD  HEENT: NC/AT  RESP: WANDY w/o crackles/wheeze/rhonci; resp unlabored  CV: +S1 S2, regular rate, no rubs/murmurs  ABD: soft, NT, ND, normal BS   : no villarreal  EXT: mild RLE edema  Skin: no rashes  Neuro: AAO, limited movement of Right leg 2/2 pain/surgery otherwise rest of extremities 5/5      [x ] Total time spent: 30 Mins and greater than 50% of this time was spent counseling/coordinating care        Nick Jules, [facility]  Internal Medicine

## 2017-12-06 NOTE — PROGRESS NOTES
Progress Note - Anders Junior 80 y o  male MRN: [id number]    Unit/Bed#: -52 Encounter: [id number]            Subjective:   Pt w/o complaint currently     Objective:     ROS  Gen: denies recent wt loss   Psych: denies mood change      Physical Exam:     Gen:        NAD   Neck:   trachea midline  Lungs:  respirations unlabored   Heart:    + S1 and S2   Abdomen:    Soft, non-tender  Psych: mood/affect appropriate  Neurologic: awake, alert, appropriately answering questions     Functional :  Mobility: PENDING  Tx: PENDING   ADLs: mod        acetaminophen 975 mg Oral Q8H [facility]   amLODIPine 10 mg Oral Daily   aspirin 324 mg Oral Daily   ergocalciferol 50,000 Units Oral Weekly   Followed by      Suzette Tovar ON 1/17/2018] cholecalciferol 1,000 Units Oral Daily   doxazosin 4 mg Oral Daily   enoxaparin 1 mg/kg Subcutaneous F68Z [facility]   folic acid-pyridoxine-cyanocobalamin 1 tablet Oral Daily   insulin glargine 10 Units Subcutaneous HS   insulin lispro 1-5 Units Subcutaneous TID AC   insulin lispro 1-5 Units Subcutaneous HS   lisinopril 40 mg Oral Daily   metoprolol tartrate 50 mg Oral Q12H [facility]   polyethylene glycol 17 g Oral Daily   warfarin 5 mg Oral Daily (warfarin)       bisacodyl    magnesium hydroxide      Assessment:  Patient is a 81 yo male s/p revision of Rt MAREN by Dr Balwinder Gary on 11/20/17 p/w stroke like symptoms and was given TPA however developed Rt thigh/gluteal hematoma; course complicated by LUE DVT    Plan:    Rehabilitation: cont PT/OT for ambulatory/ADL dysfxn    Pain: currently on tylenol ATC, intolerant to 5 mg Oxy IR per family (excessive sedation), allergy to dilaudid; trialed tramadol in the past prior to surgery for hip pain without relief, per patient and family at this time they would like to use tylenol only      Rt MAREN revison: performed on 11/20/17 by Dr Balwinder Gary, pt is 2 wks post op discussed case with Dr Balwinder Gary and in the setting of hematoma Dr Balwinder Gary has recommended leaving denise in for 1 more week and then reassess if staples can be removed then; hip precautions & WBAT per ortho and confirmed with Dr Demond RODRIGUEZ DVT: lovenox to coumadin bridge (INR 1 12); noted to have mildly decreased AT III level at 88 (LLN 92) and + Lupus anticoagulant (per lab recs this is to be repeated in 12 wks to confirm); FU with Dr Sherlyn Mckee to arrange FU testing and management of AC as OP     Rt thigh/gluteal hematoma: monitor Hg, cleared by surgery to be on lovenox to coumadin bridge (INR 1 12)  and aspirin 325 mg QD      Likely CVA: per neurology cleared to dc home plavix and instead increase home ASA 81 mg QD to   mg QD (due to non-response at ASA 81 mg QD per API) with AC and once AC stopped at Dr Kiran Ashley discretion in 6 months neurology recommends dual AP therapy; API in 1 wk; patient with allergy to statins (myalgia not hives)     CAD s/p stents: per cards cleared to dc home plavix and instead increase home ASA 81 mg QD to  mg QD due to non-response to ASA 81 mg QD per API for stroke prevention (in addition to [facility] and once [facility] stopped at Dr Kiran Ashley discretion neurology recommends dual AP therpy); on BB, patient with allergy to statins (myalgia not hives); FU with Dr Adithya Blackburn     DM: per OP records patient take glyburide 10 mg QAM and 5 mg QPM however given CKD will defer to IM regarding risk/benefits of this agent in patient with stable CKD versus switch to short-acting sulfonylureas; currently on lantus and ISS     HTN: on home regimen of cardura 4 mg qd (also for BPH), norvasc 10 mg qdaily, lisinopril 40 mg qd, however home toprol XL 25 mg Q12 has been switched to lopressor 50 mg q12     elevated PSA/BPH/incontinence: on home cardura 4 mg qd; was also on oxybutynin 5 mg qd at home which has not yet been restarted     basal cell carcinoma: FU with Dr Timothy Ramires who may refer patient for Mohs surgery  PAD (B/L renal artery stenosis, & celiac trunk stenosis): follows with Dr Edda Gonsalez, currently on AP for CAD and possible CVA, allergy to statins (myalgia not hives)    Vitamin D insufficiency: 20 4 (LLN 30) on 12/6/17, started on ergocalciferol 50,000 units Qweekly x 6 doses starting 12/6/17     CKD: baseline Cr 1 5-1 7, currently 1 35, FU with Dr Evert Richardson  Anemia: ABLA with likely component of AOCD (CKD);  Hg currently stable at 8 3  Thrombocytosis: mild likely reactive at 452        DVT ppx: lovenox to coumadin bridge (INR currently 1 12)  Dispo: reteam     Incidental lab findings: hyperparathyroidism (noted to be mild elevated above ULN of 72 at 78 9 on 1/18/17 likely 2/2 to CKD, on supplemental Vit D, OP FU with Dr Evert Richardson), elevated total/direct bilirubin (sample hemolyzed, OP FU with PCP for repeat testing and/or specialist referral at PCPs discretion), elevated homocysteine level (mildly elevated above ULN of 14 2 at 15 1, started on a folic SZMB/I4/S13 tablet in acute care; OP FU w/ Dr Tonya Gomez for B6/B12/Folate testing at Dr Eubanks Redorian discretion and OP FU with patient's own cardiologist Dr Theresa Ramirez)      Incidental findings of EKG of 11/26/17 15:17 per Dr Ani Roldan interpretation: PVCs, RBBB/wide QRS, borderline GA interval (200) and prolonged QTc; evaluated and cleared from cards standpoint (seen by Dr Ani Roldan on 11/26 and Dr Ernestina Toth on 11/27) in acute care; OP FU with Dr Theresa Ramirez      Incidental findings of echo (11/25): very mild AS, aortic root dilation/ascending aorta dilation; OP FU with Dr Theresa Ramirez     Incidental findings noted on CT A/P (11/30) & CT C/A/P (11/24): L renal lesions (noted to be stable renal cysts that appear to be simple on U/S of 11/28, OP FU with Dr Evert Richardson), hiatal hernia (no c/o of GERD/reflux symptoms, OP FU with PCP), sebaceous cyst anterior to the sternum (OP FU with PCP and/or Dr  Maritza Drum)      Other incidental findings: Rt vertebral artery occlusion, OP FU at [address] (follows with Dr Lilia Mann for PAD)      This patient was discussed by the Interdisciplinary Team in weekly case conference today  The care of the patient was extensively discussed with all care providers and an appropriate rehabilitation plan was formulated unique for this patient  Barriers were identified preventing progression of therapy and appropriate interventions were discussed with each discipline  Please see the team note for input from all disciplines regarding barriers, intervention, and discharge planning      [ x ] Total time spent: 45 Mins, and greater than 50% of this time was spent counseling/coordinating care

## 2017-12-06 NOTE — TEAM CONFERENCE
Acute RehabilitationTeam Conference Note  Date: 12/6/2017   Time: 10:42 AM       Patient Name:  Gorge Mallory       Medical Record Number: [id number]   YOB: 1935  Sex: Male          Room/Bed:  [facility]-02  Payor Info:  Payor: Jaki Oconnor / Plan: MEDICARE A AND B / Product Type: Medicare A & B Fee for Service /      Admitting Diagnosis: Degenerative joint disease (DJD) of hip [M16 9]  Transient ischemic attack (TIA) [G45 9]   Admit Date/Time:  12/5/2017  1:31 PM  Admission Comments: No comment available     Primary Diagnosis:  <principal problem not specified>  Principal Problem: <principal problem not specified>    Patient Active Problem List    Diagnosis Date Noted    Stroke ([facility] ) 11/24/2017    HTN (hypertension) 11/24/2017    DM2 (diabetes mellitus, type 2) ([facility] ) 11/24/2017    HLD (hyperlipidemia) 11/24/2017    CAD (coronary artery disease) 11/24/2017    Postoperative anemia 11/24/2017    Right hip pain 11/24/2017    Acute deep vein thrombosis (DVT) of brachial vein of left upper extremity ([address] ) 11/24/2017    Coagulopathy ([facility] ) 11/24/2017       Physical Therapy:         No notes on file    Occupational Therapy:          OT eval to be formally completed    Speech Therapy:           Received orders to assess patient and will be completed later today 12/6  Nursing Notes:  Appetite: Good  Diet Type: Dysphagia III, Diabetic                           Type of Wound (LDA): Incision           Incision 11/24/17 Hip Right (Active)   Incision Description Clean; Intact;Dry 12/5/2017  2:13 PM   Maria Elena-wound Assessment Intact;Dry 12/5/2017  2:13 PM   Closure Staples 12/5/2017  2:13 PM   Drainage Amount Scant 12/5/2017  8:00 AM   Drainage Description Serosanguineous; Yellow 12/5/2017  8:00 AM   Treatments Site care 12/5/2017  8:00 AM   Dressing Dry dressing 12/5/2017  2:13 PM   Wound packed?  No 11/24/2017  7:00 PM   Dressing Changed Changed 12/4/2017  2:12 PM   Patient Tolerance Tolerated well 11/30/2017  8:00 AM   Dressing Status Clean;Dry; Intact 12/5/2017  8:00 AM           Type of Wound Patient/Family Education: Yes  Bladder: 5 - Supervision        Bowel: 6 - Modified [place]        Pain Location: Leg, Hip     Pain Score: 0                       Hospital Pain Intervention(s): Cold applied, Repositioned, Medication (See MAR), Rest  Pain Patient/Family Education: Yes  Medication Management/Safety  Injectable: Lovenox  Safe Administration: Yes  Medication Patient/Family Education Complete: Yes    Admit to [facility] 12/05  cva status post hardware repair in R hip from fall  Was at R post surgery and developed L sided weakness, facial droop and slurred speech  Sent to our ER and given TPA then developed hematoma in R hip hip  requiring blood trasfusions  DVT L brachial vein this admit  Hip insicion has staples- MAHESH  Pt uses Abd wedge in bed  Pain managed with tylenol and ice  No grasp with L hand  Pt is diabetic, managed with lantus, humalog coverage and QID sugar checks  This week we will monitor labs &  vital signs  We will work on pain management and diabetic management  We will prevent skin breakdown and monitor incision  Pt will work on safety with transfers and remain free from falls  Case Management:     Discharge Planning  Goal Length of Stay: 12  Living Arrangements: Spouse/significant other  Support Systems: Spouse/significant other  Assistance Needed: no  Type of Current Residence: Private residence  Current Home Care Services: No  Initial assmt to be completed, following for assist w/dc planning needs  Is the patient actively participating in therapies?  yes  List any modifications to the treatment plan:     Barriers Interventions   Strength, balance, bed mobility Therapy exercises, use of assist device   Hip precautions, carryover Cueing to maintain precautions, family education   Steps in the home Therapy stair training             Is the patient making expected progress toward goals? yes  List any update or changes to goals:     Medical Goals: Patient will be medically stable for discharge to [facility] upon completion of rehab program and Patient will be able to manage medical conditions and comorbid conditions with medications and follow up upon completion of rehab program    Weekly Team Goals:   Rehab Team Goals  Bowel/Bladder Team Goal: Patient will return to premorbid level for bladder/bowel management upon completion of rehab program    Discussion: in attendance to review pts progress is rn pt ot slp cm and physician  Pt presents with decreased balance and strength, some cog deficits re: carryover with hip precautions  Pt lives in a mobile home and was using a crutch prior to admission  Pt has had multiple hip surgeries in the past  Pt is max for bed mobility, mod a with transfers using a walker  Anticipating two week los       Anticipated Discharge Date:  reteam

## 2017-12-06 NOTE — DISCHARGE SUMMARY
Discharge Summary - [facility] Internal Medicine    Patient Information: Abdirashid John 80 y o  male MRN: [id number]  Unit/Bed#: [facility] 705-01 Encounter: [id number]    Discharging Physician / Practitioner: Dex Griffith DO  PCP: Pallavi Boston DO  Admission Date: 11/24/2017  Discharge Date: 12/05/17    Reason for Admission: admitted to ICU for CVA    Discharge Diagnoses:     Principal Problem:    Stroke [facility])  Active Problems:    HTN (hypertension)    DM2 (diabetes mellitus, type 2) ([facility] )    HLD (hyperlipidemia)    CAD (coronary artery disease)    Acute deep vein thrombosis (DVT) of brachial vein of left upper extremity ([facility] )    Coagulopathy ([facility] )  Resolved Problems:    * No resolved hospital problems  *      Consultations During Hospital Stay:  · Neurology  · Dr Duran Sandoval - ortho  · Dr Lane Gant - cardio  · Dr Ronaldo Bucio - oncology  · Dr Garry Moise - surgery    Procedures Performed:     · none    Significant Findings / Test Results:     · EKG: inferior lead ST elevations, similar to prior  · Coagulation panel revealed lupus anticoagulant    Incidental Findings:   · none     Test Results Pending at Discharge (will require follow up):   · none     Outpatient Tests Requested:  · Recheck for lupus anticoagulant in 12 weeks - needs to f/u with hematology to see if he needs to continue coumadin  · Check API in 1 week to see if ASA needs to be increased  · Check CBC and INR daily until INR therapeutic x 2 days    Complications:  Pt had hip hematoma    Hospital Course:     Abdirashid John is a 80 y o  male patient who originally presented to the hospital on 11/24/2017 due to suspected CVA  Please see ICU transfer note for ICU course  He was on DAPT for suspected TIA  However, API low, so ASA increased to 325 mg   Plavix stopped b/c pt needed AC  However, due to hip hematoma, ASA temporarily sdtopped  When cleared by surg, BASA restarted, and he was d/c on  mg daily  No statin 2/2 allergy    If ok at any point for pt to stop AC, will need Plavix  He had ABLA 2/2 hip hematoma  Required 1U PRBC on GMF, and AC and AP temporarily help  Compression applied to hip  When hgb stable, asa and AC restarted  Was given 5 doses of IV iron  For LUE DVT, pt originally tx with Lovenox  Was temporarily held as above  When cleared by surg, started on Lovenox with coumadin bridge  PT will c/w lovenox until INR therapeutic x 2 days  Outpt f/u with hematology to determine length of AC  Pt noted to have ST elevations in EKG  ,  Cards consulted, who said ST elevation similar to prior EKGs  Trops neg x 3   Echo showed normal EF  Condition at Discharge: stable     Discharge Day Visit / Exam:     Subjective:  Pt seen and examined  No acute complaints  Vitals: Blood Pressure: 169/74 (12/05/17 0715)  Pulse: 67 (12/05/17 0715)  Temperature: 98 9 °F (37 2 °C) (12/05/17 0715)  Temp Source: Oral (12/05/17 0715)  Respirations: 16 (12/05/17 0715)  Height: 5' 10 5" (179 1 cm) (11/25/17 0745)  Weight - Scale: 106 kg (233 lb 4 oz) (11/25/17 0745)  SpO2: 96 % (12/05/17 0715)  Exam:   Physical Exam   Constitutional: He is oriented to person, place, and time  He appears well-developed and well-nourished  HENT:   Head: Normocephalic and atraumatic  Eyes: Conjunctivae and EOM are normal    Neck: Normal range of motion  Neck supple  Cardiovascular: Normal rate and regular rhythm  Pulmonary/Chest: Effort normal and breath sounds normal    Abdominal: Soft  Bowel sounds are normal  He exhibits no distension  There is no tenderness  Musculoskeletal: Normal range of motion  He exhibits no edema  Neurological: He is alert and oriented to person, place, and time  Skin: Skin is warm and dry  Discussion with Family: pt and son    Discharge instructions/Information to patient and family:   See after visit summary for information provided to patient and family        Provisions for Follow-Up Care:  See after visit summary for information related to follow-up care and any pertinent home health orders  Disposition:     Acute Rehab at [facility]    For Discharges to [facility]:   · Dr Fatoumata Su of [address] [place] [address] with the physician listed here  Planned Readmission: no     Discharge Statement:  I spent 35 minutes discharging the patient  This time was spent on the day of discharge  I had direct contact with the patient on the day of discharge  Greater than 50% of the total time was spent examining patient, answering all patient questions, arranging and discussing plan of care with patient as well as directly providing post-discharge instructions  Additional time then spent on discharge activities  Discharge Medications:  See after visit summary for reconciled discharge medications provided to patient and family        ** Please Note: This note has been constructed using a voice recognition system **

## 2017-12-06 NOTE — CASE MANAGEMENT
Continued Stay Review    Date: 12/4/17    Vital Signs: /74   Pulse 67   Temp 98 9 °F (37 2 °C) (Oral)   Resp 16   Ht 5' 10 5" (1 791 m)   Wt 106 kg (233 lb 4 oz)   SpO2 96%   BMI 32 99 kg/m²     Medications:   Scheduled Meds:   Continuous Infusions:   No current facility-administered medications for this encounter  PRN Meds:     Abnormal Labs/Diagnostic Results:   H/H 12/4 7 9/24 3  12/5 8 2/24 9    Age/Sex: 80 y o  male     Assessment/Plan:   12/3 Medicine Progress Note  Assessment and Plan:     ABLA 2/2 right hip hematoma:   Hemoglobin stable at 7 5 last night  Pending this morning  Will monitor H/H closely  Will restart aspirin 81 mg as the patient is high risk for stroke  Hold Lovenox  General surgery following for worsening hematoma of the right hip and thigh  Currently the plan is to monitor H&H   If still further drops will get a CT of the pelvis and right thigh with contrast   Continue to monitor hemoglobin Q 12     TIA s/p TPA on 11/24:   appreciate neuro recs     Resume aspirin today  Not on statin 2/2 allergy      Acute LUE DVT:   Anticoagulation stopped because of drop in hemoglobin and worsening hematoma  Continue to monitor  If any signs of worsening will repeat ultrasound        CAD s/p PCI x 2, last stent 2/2016:   EKG showed ST elevations, but these appear to be present in prior EKGs     Trops neg x 3     Plavix discontinued and aspirin increased to full dose      S/p R hip revision MAREN 11/20:   per ortho, WBAT   This appears stable   Pain control with and tylenol rtc   Patient did not tolerate oxycodone well   So it was discontinued     CKD3: Cr stable for now      HTN: Restarted pt's home meds including ACEi and Cardura   C/w BB and Norvasc         VTE Pharmacologic Prophylaxis:   Pharmacologic: No AC because of hematoma     Mechanical VTE Prophylaxis in Place: Yes     Patient Centered Rounds: I have performed bedside rounds with nursing staff today      Discussions with Specialists or Other Care Team Provider: neuro and gen surgery     Education and Discussions with Family / Patient:  Called the son and informed him      Time Spent for Care: 30 minutes   More than 50% of total time spent on counseling and coordination of care as described above      Current Length of Stay: 9 day(s)     Current Patient Status: Inpatient      Certification Statement: The patient will continue to require additional inpatient hospital stay due to need to monitor for bleeding  ___________________________________  12/4 Medicine Progress Note  ABLA 2/2 right hip hematoma:   Hemoglobin stable  8 3 this morning  Will monitor H/H closely  Continue aspirin  Continue to Hold Lovenox  General surgery following for worsening hematoma of the right hip and thigh   According discussed the general surgery  Since the patient's hemoglobin is stable as per them it is okay to resume anticoagulation  So I will start the patient back on Lovenox and also add Coumadin  Continue to monitor hemoglobin Q 12  Patient was still getting IV iron which will be discontinued today  He got a total of 5 days      TIA s/p TPA on 11/24:   appreciate neuro recs     Continue aspirin  Not on statin 2/2 allergy      Acute LUE DVT:   Anticoagulation stopped because of drop in hemoglobin and worsening hematoma  His hemoglobin has been stable for last 2 days    Discussed with surgery as above      CAD s/p PCI x 2, last stent 2/2016:   EKG showed ST elevations, but these appear to be present in prior EKGs     Trops neg x 3     Plavix discontinued and aspirin increased to full dose      S/p R hip revision MAREN 11/20:   per ortho, WBAT     This appears stable     Pain well controlled with scheduled tylenol    Patient did not tolerate oxycodone well   So it was discontinued     CKD3: Cr stable for now      HTN:   Continue ACEi, Cardura, BB and Norvasc         VTE Pharmacologic Prophylaxis:   Pharmacologic: No AC because of hematoma     Mechanical VTE Prophylaxis in Place: Yes     Patient Centered Rounds: I have performed bedside rounds with nursing staff today      Discussions with Specialists or Other Care Team Provider: will call and discuss with neuro, hem onc and gen surgery     Education and Discussions with Family / Patient:  Called the son and informed him      Time Spent for Care: 30 minutes   More than 50% of total time spent on counseling and coordination of care as described above      Current Length of Stay: 10 day(s)     Current Patient Status: Inpatient      Certification Statement: The patient will continue to require additional inpatient hospital stay due to need to monitor for Hb     Discharge Plan / Estimated Discharge Date:  once Hb stabilizes and after resuming AP     Code Status: Level 1 - Full Code    Discharge Plan: [facility]

## 2017-12-06 NOTE — PCC CARE MANAGEMENT
Pt is participating well with therapy and both he and his wife have been educated on potential recommendations for contd therapy on dc  Following to assist with dc planning needs

## 2017-12-06 NOTE — PROGRESS NOTES
SLP TAA       12/06/17 1100   Patient Data   Rehab Impairment Debility   Etiologic Diagnosis Multiple Medical Problems: DVT/TIA/Recent Hip Surgery   Restrictions/Precautions   Precautions Aspiration;Bed/chair alarms; Fall Risk;Hard of hearing;THR;Supervision on toilet/commode   RLE Weight Bearing Per Order WBAT   Pain Assessment   Pain Assessment 0-10   Pain Score 9   Pain Location Leg   Pain Orientation Right   Pain Onset Ongoing   Hospital Pain Intervention(s) Repositioned;Distraction   QI: Eating   Assistance Needed Set-up / clean-up   Assistance Provided by [place] No physical assistance   Eating CARE Score 5   Eating Assessment   Swallow Precautions Yes   Bedside Swallow Results Yes  (recs for level3/thins but also recs to upgrade to reg/thins)   VBS Study Results No   Food To Mouth Yes   Able To Cut No   Positioning Upright;Out of Bed   Meal Assessed Lunch   QI: Swallowing/Nutritional Status Modified food consistency   Current Diet Dysphia III; Thin   Intake Mode PO;Self   Dentures Other  (natural dentition-upper and lower)   Finishes Timely Yes   Opens Packages Yes   Findings Pt demonstrating very mild oropharyngeal dysphagia noted, but recommending to upgrade diet to regular w/ thin liquids   Eating (FIM) 5 - Patient needs help to open contianers or set up tray   Discharge Information   Patient's Discharge Plan home w/ family support   Patient's Rehab Expectations "To get back to what I was doing"   Barriers to Discharge Home Limited Family Support;Decreased Cognitive Function;Pain; Safety Considerations   Impressions Pt is demonstrating ability to tolerate safest least restrictive diet at this time, where diet has now been upgraded to regular/thin liquids  Brief f/u x1-2 sessions to monitor tolerance of diet upgrade s overt signs/sxs of aspiration     SLP Therapy Minutes   SLP Time In 1100   SLP Time Out 1130   SLP Total Time (minutes) 30   SLP Mode of treatment - Individual (minutes) 30   SLP Mode of treatment - Concurrent (minutes) 0   SLP Mode of treatment - Group (minutes) 0   SLP Mode of treatment - Co-treat (minutes) 0   SLP Mode of Teatment - Total time(minutes) 30 minutes

## 2017-12-07 LAB
GLUCOSE SERPL-MCNC: 144 MG/DL (ref 65–140)
GLUCOSE SERPL-MCNC: 189 MG/DL (ref 65–140)
GLUCOSE SERPL-MCNC: 206 MG/DL (ref 65–140)
GLUCOSE SERPL-MCNC: 206 MG/DL (ref 65–140)
INR PPP: 1.29 (ref 0.86–1.16)
PROTHROMBIN TIME: 16.2 SECONDS (ref 12.1–14.4)

## 2017-12-07 PROCEDURE — 97110 THERAPEUTIC EXERCISES: CPT

## 2017-12-07 PROCEDURE — 97535 SELF CARE MNGMENT TRAINING: CPT | Performed by: STUDENT IN AN ORGANIZED HEALTH CARE EDUCATION/TRAINING PROGRAM

## 2017-12-07 PROCEDURE — 85610 PROTHROMBIN TIME: CPT | Performed by: NURSE PRACTITIONER

## 2017-12-07 PROCEDURE — 97116 GAIT TRAINING THERAPY: CPT

## 2017-12-07 PROCEDURE — 82948 REAGENT STRIP/BLOOD GLUCOSE: CPT

## 2017-12-07 PROCEDURE — 97530 THERAPEUTIC ACTIVITIES: CPT

## 2017-12-07 RX ORDER — GLYBURIDE 5 MG/1
5 TABLET ORAL
Status: DISCONTINUED | OUTPATIENT
Start: 2017-12-08 | End: 2017-12-15

## 2017-12-07 RX ORDER — WARFARIN SODIUM 7.5 MG/1
7.5 TABLET ORAL
Status: DISCONTINUED | OUTPATIENT
Start: 2017-12-07 | End: 2017-12-09

## 2017-12-07 RX ORDER — TRAMADOL HYDROCHLORIDE 50 MG/1
50 TABLET ORAL 2 TIMES DAILY PRN
Status: DISCONTINUED | OUTPATIENT
Start: 2017-12-07 | End: 2017-12-18

## 2017-12-07 RX ADMIN — ACETAMINOPHEN 975 MG: 325 TABLET, FILM COATED ORAL at 22:12

## 2017-12-07 RX ADMIN — METOPROLOL TARTRATE 75 MG: 50 TABLET ORAL at 22:17

## 2017-12-07 RX ADMIN — AMLODIPINE BESYLATE 10 MG: 10 TABLET ORAL at 08:33

## 2017-12-07 RX ADMIN — ENOXAPARIN SODIUM 105 MG: 120 INJECTION SUBCUTANEOUS at 08:34

## 2017-12-07 RX ADMIN — INSULIN LISPRO 1 UNITS: 100 INJECTION, SOLUTION INTRAVENOUS; SUBCUTANEOUS at 11:38

## 2017-12-07 RX ADMIN — ASPIRIN 81 MG 324 MG: 81 TABLET ORAL at 08:32

## 2017-12-07 RX ADMIN — INSULIN LISPRO 1 UNITS: 100 INJECTION, SOLUTION INTRAVENOUS; SUBCUTANEOUS at 22:12

## 2017-12-07 RX ADMIN — INSULIN LISPRO 1 UNITS: 100 INJECTION, SOLUTION INTRAVENOUS; SUBCUTANEOUS at 16:55

## 2017-12-07 RX ADMIN — ACETAMINOPHEN 975 MG: 325 TABLET, FILM COATED ORAL at 14:33

## 2017-12-07 RX ADMIN — LISINOPRIL 40 MG: 20 TABLET ORAL at 08:33

## 2017-12-07 RX ADMIN — DOXAZOSIN 4 MG: 4 TABLET ORAL at 08:32

## 2017-12-07 RX ADMIN — ENOXAPARIN SODIUM 105 MG: 120 INJECTION SUBCUTANEOUS at 22:12

## 2017-12-07 RX ADMIN — METOPROLOL TARTRATE 50 MG: 50 TABLET ORAL at 08:32

## 2017-12-07 RX ADMIN — WARFARIN SODIUM 7.5 MG: 7.5 TABLET ORAL at 17:49

## 2017-12-07 RX ADMIN — FOLIC ACID-PYRIDOXINE-CYANOCOBALAMIN TAB 2.5-25-2 MG 1 TABLET: 2.5-25-2 TAB at 08:33

## 2017-12-07 RX ADMIN — ACETAMINOPHEN 975 MG: 325 TABLET, FILM COATED ORAL at 06:36

## 2017-12-07 NOTE — PROGRESS NOTES
12/07/17 0700   Pain Assessment   Pain Assessment 0-10   Pain Score 5   Restrictions/Precautions   Precautions THR; Fall Risk   Weight Bearing Restrictions No   RLE Weight Bearing Per Order WBAT   QI: Oral Hygiene   Assistance Needed Set-up / clean-up   Oral Hygiene CARE Score 5   Grooming   Able To Initiate Tasks   Limitation Noted In Strength   Grooming (FIM) 5 - [place] sets up supplies or applies device   QI: Shower/Bathe Self   Assistance Needed Physical assistance   Assistance Provided by [place] 25%-49%   Shower/Bathe Self CARE Score 3   Bathing   Assessed Bath Style Sponge Bath   Anticipated D/C Bath Style Shower   Able to Deridder Sukhdeep No   Able to [employer] Temperature No   Able to Wash/Rinse/Dry (body part) Left Arm;Right Arm;L Upper Leg;R Upper Leg;Chest;Abdomen;Perineal Area   Limitations Noted in Balance; Endurance;ROM;Safety;Strength   Positioning Seated;Standing   Bathing (FIM) 3 - Patient completes 5/10  6/10 or 7/10 parts   QI: Upper Body Dressing   Assistance Needed Set-up / [address] Provided by [place] No physical assistance   Upper Body Dressing CARE Score 5   QI: Lower Body Dressing   Assistance Needed Incidental touching   Assistance Provided by [place] No physical assistance   Comment LHAE   Lower Body Dressing CARE Score 4   QI: Putting On/Taking Off Footwear   Assistance Needed Adaptive equipment;Set-up / clean-up   Assistance Provided by [place] No physical assistance   Comment sock aide   Putting On/Taking Off Footwear CARE Score 5   Dressing/Undressing Clothing   Remove UB Clothes Other  (gown)   Remove LB Clothes [address]; Undergarment;Socks   Limitations Noted In Balance; Endurance; Safety;Strength;ROM   Adaptive Equipment Sock Aide;Dressing Stick; Reacher   Positioning Supported Sit;Standing   UB Dressing (FIM) 5 - [place] sets up supplies or applies device   LB Dressing (FIM) 4 - Patient requires steadying assist or light touching   QI: Lying to Sitting on Side of Bed   Assistance Needed Physical assistance   Assistance Provided by [place] 50%-74%   Lying to Sitting on Side of Bed CARE Score 2   QI: Sit to Stand   Assistance Needed Physical assistance   Assistance Provided by [place] 25%-49%   Comment RW   Sit to Stand CARE Score 3   QI: Chair/Bed-to-Chair Transfer   Assistance Needed Physical assistance   Assistance Provided by [place] 25%-49%   Chair/Bed-to-Chair Transfer CARE Score 3   Transfer Bed/Chair/Wheelchair   Limitations Noted In Balance; Endurance;LE Strength   Adaptive Equipment Roller Walker   Stand Pivot Moderate Assist   Sit to Stand Moderate Assist   Stand to Sit Moderate Assist   Supine to Sit Moderate Assist   Bed, Chair, Wheelchair Transfer (FIM) 3 - [place] needs to lift, boost or assist to stand OR sit   Cognition   Overall Cognitive Status Impaired   Arousal/Participation Cooperative   Attention Attends with cues to redirect   Orientation Level Oriented X4   Memory Decreased short term memory;Decreased recall of recent events;Decreased recall of precautions   Following Commands Follows one step commands with increased time or repetition   Activity Tolerance   Activity Tolerance Patient tolerated treatment well   Assessment   Treatment Assessment Pt participates in OT session with focus on bathing, dressing, grooming, transfers, and bed mobility to increase I with adls  Pt mod A supine to sit EOB with bed railing support  Pt mod A chair transfer from EOB with RW support and SPT with vc for safe hand placement  Pt also has a tendency to plop during stand to sit and requires education for safety  Pt reviewed THP and recalled 2/3 precautions and forgot twisting precaution  Pt mod A bathing with SB while seated in chair and still exhibits poor sitting balance with left lateral lean  Pt requires A to wash both feet and bottom   Pt setup UB dressing to don shirt and CGA LB dressing to don underwear, socks, and pants while seated along with LHAE 2* decreased ROM  Pt setup grooming to brush teeth and wash face  Pt will continue to benefit from activity tolerance, transfers, and adls  Prognosis Good   Problem List Decreased strength;Decreased range of motion;Decreased endurance; Impaired balance;Decreased mobility; Decreased safety awareness;Orthopedic restrictions;Pain   Plan   Treatment/Interventions ADL retraining;Functional transfer training; Endurance training   Progress Progressing toward goals   OT Therapy Minutes   OT Time In 0700   OT Time Out 0830   OT Total Time (minutes) 90   OT Mode of treatment - Individual (minutes) 90   OT Mode of treatment - Concurrent (minutes) 0   OT Mode of treatment - Group (minutes) 0   OT Mode of treatment - Co-treat (minutes) 0   OT Mode of Teatment - Total time(minutes) 90 minutes   Therapy Time missed   Time missed?  No

## 2017-12-07 NOTE — PROGRESS NOTES
Internal Medicine Progress Note  Patient: Edna Jolly  Age/sex: 80 y o  male  Medical Record #: [id number]      ASSESSMENT/PLAN:  Edna Jolly is seen and examined and mangement for following issues:    1  TPA aborted CVA vs TIA/multiple areas intracranial atherosclerosis/severe stenosis inferior Rt MCA:  Continue ASA  Resume Plavix once pt completes a 6 month course of anticoagulation for Lt UE DVT  No statin as pt had hives in the past  Getting an API on 12/13/17? 2   [facility]:  Pain control per primary service  Monitor incision  Follow-up with [facility]  3   Rt hip hematoma: occurred after getting tPA  Hemoglobin stable today    4  Acute blood loss anemia;  s/p multiple transfusions:  Stable  5   Lt UE DVT:  Continue therapeutic Lovenox until INR > 2  Currently on Coumadin 5mg qd = will increase to 7 5 mg qd  INR in AM   Thrombosis Panel: + lupus anticoagulant, mildly decreased antithrombin III, PTT-LA mixing study prolonged at 53 sec, and hexagonal phase phospholipid elevated at 16  Pt will need Hematology follow-up and recheck lupus anticoagulant in 12 weeks    6  CAD with CONCEPCION:  Continue full ASA  For API 12/13/17    7  HTN: not always optimally controlled on Amlodipine 10mg daily, Cardura 4mg daily, Lisinopril 40mg daily and Lopressor 50mg every 12 hours = will increase Lopressor to 75mg q12hrs  8   CKD stage III; baseline Cr 1 4-1 5: stable; Will monitor  BMP in AM    9  DM type 2:  HbA1C in April 7 3  Pt takes Glyburide 5mg 2 tabs in the AM and 1 tab in the PM   Pt has been on insulin coverage and 10U Lantus at bedtime  Add back glyburide 5mg in the AM tomorrow (was to start yesterday but not ordered); will stop Lantus  , 208, 160, 187; fasting today 144/lunch 206    10  Vit D deficiency:  started 50,000U weekly x 6 and then 1000U/day      Subjective: Patient seen and examined   No new or overnight issues     ROS:   GI: denies abdominal pain, change bowel habits or reflux symptoms  Neuro: No new neurologic changes  Respiratory: No Cough, SOB  Cardiovascular: No CP, palpitations     Scheduled Meds:    acetaminophen 975 mg Oral Q8H [address]   amLODIPine 10 mg Oral Daily   aspirin 324 mg Oral Daily   ergocalciferol 50,000 Units Oral Weekly   Followed by      Suzette Tovar ON 1/17/2018] cholecalciferol 1,000 Units Oral Daily   doxazosin 4 mg Oral Daily   enoxaparin 1 mg/kg Subcutaneous Z34X [address]   folic acid-pyridoxine-cyanocobalamin 1 tablet Oral Daily   insulin glargine 10 Units Subcutaneous HS   insulin lispro 1-5 Units Subcutaneous TID AC   insulin lispro 1-5 Units Subcutaneous HS   lisinopril 40 mg Oral Daily   metoprolol tartrate 50 mg Oral Q12H [address]   polyethylene glycol 17 g Oral Daily   warfarin 5 mg Oral Daily (warfarin)       Labs:       Results from last 7 days  Lab Units 12/06/17  0523 12/05/17  1017 12/05/17  0543  12/04/17  0505   WBC Thousand/uL  --   --  8 00  --  7 57   HEMOGLOBIN g/dL 8 3* 8 6* 8 2*  < > 8 4*   HEMATOCRIT % 25 6* 26 5* 24 9*  < > 24 9*   PLATELETS Thousands/uL  --   --  452*  --  413*   < > = values in this interval not displayed      Results from last 7 days  Lab Units 12/05/17  0543 12/01/17  0512   SODIUM mmol/L 136 135*   POTASSIUM mmol/L 4 3 4 4   CHLORIDE mmol/L 105 102   CO2 mmol/L 25 25   BUN mg/dL 22 27*   CREATININE mg/dL 1 35* 1 27   GLUCOSE RANDOM mg/dL 129 127   CALCIUM mg/dL 8 8 8 8           Results from last 7 days  Lab Units 12/07/17  0556 12/06/17  0523   INR  1 29* 1 12          Glucose (mg/dL)   Date Value   12/05/2017 129   12/01/2017 127   11/30/2017 258 (H)   11/28/2017 159 (H)   12/01/2015 141 (H)   07/14/2015 124 (H)   09/11/2014 116 (H)   01/09/2014 134 (H)     Glucose, i-STAT (mg/dl)   Date Value   11/24/2017 171 (H)       Labs reviewed    Physical Examination:  Vitals:   Vitals:    12/06/17 2113 12/07/17 0548 12/07/17 0614 12/07/17 1100   BP: 167/84 (!) 198/88 (!) 180/82 135/63   Pulse: 73 76 72   Resp: 20 20     Temp: 98 3 °F (36 8 °C) 99 2 °F (37 3 °C)     TempSrc: Oral Oral     SpO2: 97% 96%  95%   Weight:       Height:           GEN: NAD  HEENT: NC/AT  RESP: BBS w/o crackles/wheeze/rhonci; resp unlabored  CV: +S1 S2, regular rate, no rubs/murmurs  ABD: soft, NT, ND, normal BS   : no villarreal  EXT: mild RLE edema  Skin: no rashes  Neuro: AAO, limited movement of Right leg 2/2 pain/surgery otherwise rest of extremities 5/5      [x ] Total time spent: 30 Mins and greater than 50% of this time was spent counseling/coordinating care        Barber Anne, [address] [facility]  Internal Medicine

## 2017-12-07 NOTE — PROGRESS NOTES
Progress Note - Willa Armstrong 80 y o  male MRN: [id number]    Unit/Bed#: [facility] 247-74 Encounter: [id number]            Subjective:   Updated patient on medical issues     Objective:     ROS  Gen: denies recent wt loss   Psych: denies mood change    Vitals:    12/07/17 1338   BP: 143/67   Pulse: 74   Resp: 20   Temp: 98 6 °F (37 °C)   SpO2: 96%         Physical Exam:     Gen:        NAD   Neck:   trachea midline  Lungs:  respirations unlabored   Heart:    + S1 and S2   Abdomen:    Soft, non-tender  Psych: mood/affect appropriate  Neurologic: awake, alert, appropriately answering questions     Functional :  Mobility: min   Tx: mod  ADLs: mod        acetaminophen 975 mg Oral Q8H [address]   amLODIPine 10 mg Oral Daily   aspirin 324 mg Oral Daily   ergocalciferol 50,000 Units Oral Weekly   Followed by      Td Lin ON 1/17/2018] cholecalciferol 1,000 Units Oral Daily   doxazosin 4 mg Oral Daily   enoxaparin 1 mg/kg Subcutaneous F02K [address]   folic acid-pyridoxine-cyanocobalamin 1 tablet Oral Daily   [START ON 12/8/2017] glyBURIDE 5 mg Oral Daily With Breakfast   insulin lispro 1-5 Units Subcutaneous TID AC   insulin lispro 1-5 Units Subcutaneous HS   lisinopril 40 mg Oral Daily   metoprolol tartrate 75 mg Oral Q12H [address]   polyethylene glycol 17 g Oral Daily   warfarin 7 5 mg Oral Daily (warfarin)       bisacodyl    magnesium hydroxide    traMADol      Assessment:  Patient is a 79 yo male s/p revision of Rt MAREN by Dr Milvia Goodman on 11/20/17 p/w stroke like symptoms and was given TPA however developed Rt thigh/gluteal hematoma; course complicated by LUE DVT    Plan:    Rehabilitation: cont PT/OT for ambulatory/ADL dysfxn    Pain: currently on tylenol ATC, intolerant to 5 mg Oxy IR per family (excessive sedation), allergy to dilaudid; trialed tramadol in the past prior to surgery for hip pain without relief, per patient and family at this time they would like to use tylenol only      Rt MAREN revison: performed on 11/20/17 by   Ledy Finney, pt is 2 wks post op discussed case with Dr Ledy Finney and in the setting of hematoma Dr Ledy Finney has recommended leaving staples in for 1 more week and then reassess if staples can be removed then; hip precautions & WBAT per ortho and confirmed with Dr Ledy Finney     LUMACK DVT: lovenox to coumadin bridge (INR 1 29); noted to have mildly decreased AT III level at 88 (LLN 92) and + Lupus anticoagulant (per lab recs this is to be repeated in 12 wks to confirm); FU with Dr Rebeca Bee to arrange FU testing and management of AC as OP     Rt thigh/gluteal hematoma: monitor Hg, cleared by surgery to be on lovenox to coumadin bridge (INR 1 29)  and aspirin 325 mg QD      Likely CVA: per neurology cleared to dc home plavix and instead increase home ASA 81 mg QD to   mg QD (due to non-response at ASA 81 mg QD per API) with AC and once AC stopped at Dr Gianna De Dios discretion in 6 months neurology recommends dual AP therapy; API in 1 wk; patient with allergy to statins (myalgia not hives)     CAD s/p stents: per cards cleared to dc home plavix and instead increase home ASA 81 mg QD to  mg QD due to non-response to ASA 81 mg QD per API for stroke prevention (in addition to [facility] and once [facility] stopped at Dr Gianna De Dios discretion neurology recommends dual AP therpy); on BB, patient with allergy to statins (myalgia not hives); FU with Dr Lupe Amaral     DM: per OP records patient take glyburide 10 mg QAM and 5 mg QPM however given CKD will defer to IM regarding risk/benefits of this agent in patient with stable CKD versus switch to short-acting sulfonylureas; currently on glyburide 5 mg QAM and ISS, per IM     HTN: on home regimen of cardura 4 mg qd (also for BPH), norvasc 10 mg qdaily, lisinopril 40 mg qd, however home toprol XL 25 mg Q12 has been switched to lopressor 75 mg q12     elevated PSA/BPH/incontinence: on home cardura 4 mg qd; was also on oxybutynin 5 mg qd at home which has not yet been restarted     basal cell carcinoma: FU with Dr Godfrey De Dios who may refer patient for Mohs surgery  PAD (B/L renal artery stenosis, & celiac trunk stenosis): follows with Dr Rosina Duff, currently on AP for CAD and possible CVA, allergy to statins (myalgia not hives)    Vitamin D insufficiency: 20 4 (LLN 30) on 12/6/17, started on ergocalciferol 50,000 units Qweekly x 6 doses starting 12/6/17     CKD: baseline Cr 1 5-1 7, currently 1 35, FU with Dr Kadi Sparrow  Anemia: ABLA with likely component of AOCD (CKD);  Hg currently stable at 8 3  Thrombocytosis: mild likely reactive at 452        DVT ppx: lovenox to coumadin bridge (INR currently 1 29)  Dispo: reteam     Incidental lab findings: hyperparathyroidism (noted to be mild elevated above ULN of 72 at 78 9 on 1/18/17 likely 2/2 to CKD, on supplemental Vit D, OP FU with Dr Kadi Sparrow), elevated total/direct bilirubin (sample hemolyzed, OP FU with PCP for repeat testing and/or specialist referral at PCPs discretion), elevated homocysteine level (mildly elevated above ULN of 14 2 at 15 1, started on a folic OVQS/M2/L37 tablet in acute care; OP FU w/ Dr Lisandro Cruz for B6/B12/Folate testing at Dr Lunsford Current discretion and OP FU with patient's own cardiologist Dr Shona Myers)      Incidental findings of EKG of 11/26/17 15:17 per Dr Polo Shelley interpretation: PVCs, RBBB/wide QRS, borderline NE interval (200) and prolonged QTc; evaluated and cleared from cards standpoint (seen by Dr Polo Shelley on 11/26 and Dr Susan Murillo on 11/27) in acute care; OP FU with Dr Shona Myers      Incidental findings of echo (11/25): very mild AS, aortic root dilation/ascending aorta dilation; OP FU with Dr Shona Myers     Incidental findings noted on CT A/P (11/30) & CT C/A/P (11/24): L renal lesions (noted to be stable renal cysts that appear to be simple on U/S of 11/28, OP FU with Dr Kadi Sparrow), hiatal hernia (no c/o of GERD/reflux symptoms, OP FU with PCP), sebaceous cyst anterior to the sternum (OP FU with PCP and/or Dr  Mayco)      Other incidental findings: Rt vertebral artery occlusion, OP FU at [facility] (follows with Dr Pepper Cuevas for PAD)

## 2017-12-07 NOTE — PROGRESS NOTES
Physical Therapy Progress Note   12/07/17 1000   Pain Assessment   Pain Assessment 0-10   Pain Score 5   Pain Type Surgical pain   Pain Location Leg;Hip   Pain Orientation Right   Restrictions/Precautions   Precautions THR; Fall Risk   Weight Bearing Restrictions No   RLE Weight Bearing Per Order WBAT   Cognition   Overall Cognitive Status Impaired   Arousal/Participation Cooperative   Attention Attends with cues to redirect   Orientation Level Oriented X4   Memory Decreased recall of precautions;Decreased recall of recent events;Decreased short term memory   Following Commands Follows one step commands with increased time or repetition   Subjective   Subjective reports 5/10 pain R hip, NSG informed, pain meds inc'd by SLIM; no other c/o   QI: Roll Left and Right   Assistance Needed Physical assistance   Assistance Provided by Roby 50%-74%   Roll Left and Right CARE Score 2   QI: Sit to Lying   Assistance Needed Physical assistance   Assistance Provided by Roby 25%-49%   Sit to Lying CARE Score 3   QI: Lying to Sitting on Side of Bed   Assistance Needed Physical assistance   Assistance Provided by Roby 50%-74%   Lying to Sitting on Side of Bed CARE Score 2   QI: Sit to Stand   Assistance Needed Physical assistance   Assistance Provided by Roby 25%-49%   Sit to Stand CARE Score 3   Bed Mobility   Able to Roll Left to Right;Right to Left;Scoot Up   Findings min/modA   QI: Chair/Bed-to-Chair Transfer   Assistance Needed Physical assistance   Assistance Provided by Roby 25%-49%   Chair/Bed-to-Chair Transfer CARE Score 3   Transfer Bed/Chair/Wheelchair   Limitations Noted In Balance;Confidence; Coordination; Endurance;Pain Management;Problem Solving;UE Strength;LE Strength   Adaptive Equipment Roller Walker   Stand Pivot Minimal Assist;Supervision   Sit to Stand Minimal Assist;Supervision   Stand to Sit Minimal Assist;Supervision   Supine to Sit Minimal Assist;Moderate Assist   Sit to Supine Minimal Assist;Moderate Assist   Bed, Chair, Wheelchair Transfer (FIM) 3 - Patient completes 50 - 74% of all tasks   QI: Car Transfer   Assistance Needed Physical assistance   Assistance Provided by [place] 50%-74%   Car Transfer CARE Score 2   QI: Walk 10 Feet   Assistance Needed Physical assistance   Assistance Provided by [place] Less than 25%   Walk 10 Feet CARE Score 3   QI: Walk 50 Feet with Two Turns   Assistance Needed Physical assistance   Assistance Provided by [place] Less than 25%   Comment RW with CF   Walk 50 Feet with Two Turns CARE Score 3   QI: Walk 150 Feet   Comment only able to amb in 50-75' increments before requesting to sit; Reason if not Attempted Activity not applicable   Walk 106 Feet CARE Score 9   QI: Walking 10 Feet on Uneven Surfaces   Reason if not Attempted Activity not applicable   Walking 10 Feet on Uneven Surfaces CARE Score 9   Ambulation   Does the patient walk? 2  Yes   Primary Discharge Mode of Locomotion Walk   Walk Assist Level Contact Guard   Gait Pattern Improper weight shift;Decreased R stance; Step to; Slow Ally; Forward Flexion   Assist Device Roller Walker   Distance Walked (feet) 50 ft  (x3; 75' x1)   Limitations Noted In Strength;Speed;Swing;Posture;Balance; Coordination;Device Management; Heel Strike; Safety   Findings uses B UE to compensate for R hip pain   Walking (FIM) 2 - Patient ambulates between 50 - 149 feet regardless of assist/device/set up   Wheelchair mobility   QI: Does the patient use a wheelchair? 0   No   Wheelchair (FIM) 0 - Activity does not occur   QI: 1 Step (Curb)   Assistance Needed Physical assistance   Assistance Provided by Harbeson Less than 25%   1 Step (Curb) CARE Score 3   QI: 4 Steps   Assistance Needed Physical assistance   Assistance Provided by [place] Less than 25%   4 Steps CARE Score 3   QI: 12 Steps   Comment stairs x4 only with S and B HR   Reason if not Attempted Activity not applicable   12 Steps CARE Score 9   Stairs   Type Stairs;Curb   # of Steps 4   Weight Bearing Precautions Fall Risk   Assist Devices Bilateral Rail;Roller Walker   Findings curb with RW and S; stairs x4 with CS and B HR   QI: Picking Up Object   Reason if not Attempted Safety concerns   Picking Up Object CARE Score 88   QI: Toilet Transfer   Assistance Needed Physical assistance   Assistance Provided by [place] 25%-49%   Toilet Transfer CARE Score 3   Toilet Transfer   Toilet Transfer (FIM) 3 - [place] needs to lift, boost or assist to stand OR sit   Therapeutic Interventions   Strengthening standing TE (hip flex/ext/abd/add, HS curls, mini squats, calf raises) reps until fatigued   Flexibility manual stretch B HS and gastroc;    Equipment Use   NuStep x12 minutes, level 3   Assessment   Treatment Assessment Pt in w/c prior to session; able to STS/SPT with S/cgA and RW; amb 50' (x3) with RW and CS/cgA and 76' with RW and CS/cgA; pt self limiting d/t R hip pain; instructed in standing TE (as above); curb/ramp/stairs with CS/cgA  and B HR/RW (as above); finished session by amb back to room in 50-75' increments; seated in bedside recliner with alarms active and all needs in reach; recommend cont PT POC;    Problem List Decreased strength;Decreased range of motion;Decreased endurance; Impaired balance;Decreased mobility; Decreased safety awareness;Orthopedic restrictions;Pain   Barriers to Discharge Decreased caregiver support   Plan   Treatment/Interventions ADL retraining;Functional transfer training;LE strengthening/ROM; Elevations; Therapeutic exercise; Endurance training;Cognitive reorientation;Patient/family training;Equipment eval/education; Bed mobility;Gait training   Progress Progressing toward goals   Recommendation   Recommendation Home with family support   Equipment Recommended Walker   PT Therapy Minutes   PT Time In 1000   PT Time Out 1130   PT Total Time (minutes) 90   PT Mode of treatment - Individual (minutes) 30   PT Mode of treatment - Concurrent (minutes) 60   PT Mode of treatment - Group (minutes) 0   PT Mode of treatment - Co-treat (minutes) 0   PT Mode of Teatment - Total time(minutes) 90 minutes   Therapy Time missed   Time missed?  No     Elmiran So, PTA

## 2017-12-07 NOTE — PLAN OF CARE
DISCHARGE PLANNING     Discharge to home or other facility with appropriate resources Progressing        INFECTION - ADULT     Absence or prevention of progression during hospitalization Progressing     Absence of fever/infection during neutropenic period Progressing        PAIN - ADULT     Verbalizes/displays adequate comfort level or baseline comfort level Progressing        Potential for Falls     Patient will remain free of falls Progressing        Prexisting or High Potential for Compromised Skin Integrity     Skin integrity is maintained or improved Progressing        SAFETY ADULT     Maintain or return to baseline ADL function Progressing     Maintain or return mobility status to optimal level Progressing

## 2017-12-08 LAB
ANION GAP SERPL CALCULATED.3IONS-SCNC: 6 MMOL/L (ref 4–13)
BASOPHILS # BLD AUTO: 0.04 THOUSANDS/ΜL (ref 0–0.1)
BASOPHILS NFR BLD AUTO: 1 % (ref 0–1)
BUN SERPL-MCNC: 27 MG/DL (ref 5–25)
CALCIUM SERPL-MCNC: 8.6 MG/DL (ref 8.3–10.1)
CHLORIDE SERPL-SCNC: 102 MMOL/L (ref 100–108)
CO2 SERPL-SCNC: 26 MMOL/L (ref 21–32)
CREAT SERPL-MCNC: 1.38 MG/DL (ref 0.6–1.3)
EOSINOPHIL # BLD AUTO: 0.25 THOUSAND/ΜL (ref 0–0.61)
EOSINOPHIL NFR BLD AUTO: 4 % (ref 0–6)
ERYTHROCYTE [DISTWIDTH] IN BLOOD BY AUTOMATED COUNT: 17.3 % (ref 11.6–15.1)
GFR SERPL CREATININE-BSD FRML MDRD: 48 ML/MIN/1.73SQ M
GLUCOSE P FAST SERPL-MCNC: 130 MG/DL (ref 65–99)
GLUCOSE SERPL-MCNC: 100 MG/DL (ref 65–140)
GLUCOSE SERPL-MCNC: 130 MG/DL (ref 65–140)
GLUCOSE SERPL-MCNC: 143 MG/DL (ref 65–140)
GLUCOSE SERPL-MCNC: 211 MG/DL (ref 65–140)
GLUCOSE SERPL-MCNC: 97 MG/DL (ref 65–140)
HCT VFR BLD AUTO: 25 % (ref 36.5–49.3)
HGB BLD-MCNC: 8.2 G/DL (ref 12–17)
INR PPP: 1.58 (ref 0.86–1.16)
LYMPHOCYTES # BLD AUTO: 1.19 THOUSANDS/ΜL (ref 0.6–4.47)
LYMPHOCYTES NFR BLD AUTO: 20 % (ref 14–44)
MCH RBC QN AUTO: 30.6 PG (ref 26.8–34.3)
MCHC RBC AUTO-ENTMCNC: 32.8 G/DL (ref 31.4–37.4)
MCV RBC AUTO: 93 FL (ref 82–98)
MONOCYTES # BLD AUTO: 0.55 THOUSAND/ΜL (ref 0.17–1.22)
MONOCYTES NFR BLD AUTO: 9 % (ref 4–12)
NEUTROPHILS # BLD AUTO: 3.94 THOUSANDS/ΜL (ref 1.85–7.62)
NEUTS SEG NFR BLD AUTO: 66 % (ref 43–75)
NRBC BLD AUTO-RTO: 0 /100 WBCS
PLATELET # BLD AUTO: 387 THOUSANDS/UL (ref 149–390)
PMV BLD AUTO: 8.5 FL (ref 8.9–12.7)
POTASSIUM SERPL-SCNC: 4.5 MMOL/L (ref 3.5–5.3)
PROTHROMBIN TIME: 19 SECONDS (ref 12.1–14.4)
RBC # BLD AUTO: 2.68 MILLION/UL (ref 3.88–5.62)
SODIUM SERPL-SCNC: 134 MMOL/L (ref 136–145)
WBC # BLD AUTO: 6.01 THOUSAND/UL (ref 4.31–10.16)

## 2017-12-08 PROCEDURE — 85025 COMPLETE CBC W/AUTO DIFF WBC: CPT | Performed by: NURSE PRACTITIONER

## 2017-12-08 PROCEDURE — 97110 THERAPEUTIC EXERCISES: CPT

## 2017-12-08 PROCEDURE — 97535 SELF CARE MNGMENT TRAINING: CPT

## 2017-12-08 PROCEDURE — 80048 BASIC METABOLIC PNL TOTAL CA: CPT | Performed by: NURSE PRACTITIONER

## 2017-12-08 PROCEDURE — 97116 GAIT TRAINING THERAPY: CPT

## 2017-12-08 PROCEDURE — 85610 PROTHROMBIN TIME: CPT | Performed by: NURSE PRACTITIONER

## 2017-12-08 PROCEDURE — 82948 REAGENT STRIP/BLOOD GLUCOSE: CPT

## 2017-12-08 PROCEDURE — 97530 THERAPEUTIC ACTIVITIES: CPT

## 2017-12-08 PROCEDURE — 97532 HB COGNITIVE SKILLS DEVELOPMENT: CPT

## 2017-12-08 RX ADMIN — FOLIC ACID-PYRIDOXINE-CYANOCOBALAMIN TAB 2.5-25-2 MG 1 TABLET: 2.5-25-2 TAB at 08:21

## 2017-12-08 RX ADMIN — LISINOPRIL 40 MG: 20 TABLET ORAL at 08:19

## 2017-12-08 RX ADMIN — METOPROLOL TARTRATE 75 MG: 50 TABLET ORAL at 08:20

## 2017-12-08 RX ADMIN — POLYETHYLENE GLYCOL 3350 17 G: 17 POWDER, FOR SOLUTION ORAL at 08:18

## 2017-12-08 RX ADMIN — GLYBURIDE 5 MG: 5 TABLET ORAL at 08:22

## 2017-12-08 RX ADMIN — ASPIRIN 81 MG 324 MG: 81 TABLET ORAL at 08:19

## 2017-12-08 RX ADMIN — AMLODIPINE BESYLATE 10 MG: 10 TABLET ORAL at 08:21

## 2017-12-08 RX ADMIN — METOPROLOL TARTRATE 75 MG: 50 TABLET ORAL at 21:57

## 2017-12-08 RX ADMIN — ENOXAPARIN SODIUM 105 MG: 120 INJECTION SUBCUTANEOUS at 08:22

## 2017-12-08 RX ADMIN — DOXAZOSIN 4 MG: 4 TABLET ORAL at 08:21

## 2017-12-08 RX ADMIN — ACETAMINOPHEN 975 MG: 325 TABLET, FILM COATED ORAL at 06:03

## 2017-12-08 RX ADMIN — ENOXAPARIN SODIUM 105 MG: 120 INJECTION SUBCUTANEOUS at 21:53

## 2017-12-08 RX ADMIN — WARFARIN SODIUM 7.5 MG: 7.5 TABLET ORAL at 17:27

## 2017-12-08 RX ADMIN — INSULIN LISPRO 1 UNITS: 100 INJECTION, SOLUTION INTRAVENOUS; SUBCUTANEOUS at 11:36

## 2017-12-08 RX ADMIN — ACETAMINOPHEN 975 MG: 325 TABLET, FILM COATED ORAL at 13:08

## 2017-12-08 RX ADMIN — ACETAMINOPHEN 975 MG: 325 TABLET, FILM COATED ORAL at 21:52

## 2017-12-08 NOTE — PROGRESS NOTES
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  3   Rt hip hematoma: occurred after getting tPA  Hemoglobin stable today    4  Acute blood loss anemia;  s/p multiple transfusions:  Stable  5   Lt UE DVT:  Continue therapeutic Lovenox until INR > 2  Was on Coumadin 5mg qd and increased to 7 5 mg qd last evening  INR again in AM   Thrombosis Panel: + lupus anticoagulant, mildly decreased antithrombin III, PTT-LA mixing study prolonged at 53 sec, and hexagonal phase phospholipid elevated at 16  Pt will need Hematology follow-up and recheck lupus anticoagulant in 12 weeks    6  CAD with CONCEPCION:  Continue full ASA  For API 12/13/17    7  HTN: not always optimally controlled on Amlodipine 10mg daily, Cardura 4mg daily, Lisinopril 40mg daily and Lopressor 50mg every 12 hours so yesterday increased Lopressor to 75mg q12hrs  No changes today    8  CKD stage III; baseline Cr 1 4-1 5: stable; Will monitor  9   DM type 2:  HbA1C in April 7 3  Pt takes Glyburide 5mg 2 tabs in the AM and 1 tab in the PM   Pt has been on insulin coverage and 10U Lantus at bedtime  Added back glyburide 5mg qAM this AM and stopped Lantus  , 206, 206, 189; fasting today 143/lunch 211    10  Vit D deficiency:  started 50,000U weekly x 6 and then 1000U/day      Subjective: Patient seen and examined   No new or overnight issues     ROS:   GI: denies abdominal pain, change bowel habits or reflux symptoms  Neuro: No new neurologic changes  Respiratory: No Cough, SOB  Cardiovascular: No CP, palpitations     Scheduled Meds:    acetaminophen 975 mg Oral Q8H [address]   amLODIPine 10 mg Oral Daily   aspirin 324 mg Oral Daily   ergocalciferol 50,000 Units Oral Weekly   Followed by      Micehla Alcazar ON 1/17/2018] cholecalciferol 1,000 Units Oral Daily   doxazosin 4 mg Oral Daily   enoxaparin 1 mg/kg Subcutaneous Y30E [address]   folic acid-pyridoxine-cyanocobalamin 1 tablet Oral Daily   glyBURIDE 5 mg Oral Daily With Breakfast   insulin lispro 1-5 Units Subcutaneous TID AC   insulin lispro 1-5 Units Subcutaneous HS   lisinopril 40 mg Oral Daily   metoprolol tartrate 75 mg Oral Q12H [address]   polyethylene glycol 17 g Oral Daily   warfarin 7 5 mg Oral Daily (warfarin)       Labs:       Results from last 7 days  Lab Units 12/08/17  0554 12/06/17  0523  12/05/17  0543   WBC Thousand/uL 6 01  --   --  8 00   HEMOGLOBIN g/dL 8 2* 8 3*  < > 8 2*   HEMATOCRIT % 25 0* 25 6*  < > 24 9*   PLATELETS Thousands/uL 387  --   --  452*   < > = values in this interval not displayed      Results from last 7 days  Lab Units 12/08/17  0554 12/05/17  0543   SODIUM mmol/L 134* 136   POTASSIUM mmol/L 4 5 4 3   CHLORIDE mmol/L 102 105   CO2 mmol/L 26 25   BUN mg/dL 27* 22   CREATININE mg/dL 1 38* 1 35*   GLUCOSE RANDOM mg/dL 130 129   CALCIUM mg/dL 8 6 8 8           Results from last 7 days  Lab Units 12/08/17  0554 12/07/17  0556   INR  1 58* 1 29*          Glucose (mg/dL)   Date Value   12/08/2017 130   12/05/2017 129   12/01/2017 127   11/30/2017 258 (H)   12/01/2015 141 (H)   07/14/2015 124 (H)   09/11/2014 116 (H)   01/09/2014 134 (H)     Glucose, i-STAT (mg/dl)   Date Value   11/24/2017 171 (H)       Labs reviewed    Physical Examination:  Vitals:   Vitals:    12/08/17 0544 12/08/17 0603 12/08/17 0820 12/08/17 0821   BP: (!) 172/81 158/82 126/52 126/52   Pulse: 96  74    Resp: 20      Temp: 98 3 °F (36 8 °C)      TempSrc: Oral      SpO2: 97%      Weight:       Height:           GEN: NAD  HEENT: NC/AT  RESP: BBS w/o crackles/wheeze/rhonci; resp unlabored  CV: +S1 S2, regular rate, no rubs/murmurs  ABD: soft, NT, ND, normal BS   : no villarreal  EXT: mild RLE edema = added TEDS  Skin: no rashes  Neuro: AAO, limited movement of Right leg 2/2 pain/surgery otherwise rest of extremities 5/5      [x ] Total time spent: 30 Mins and greater than 50% of this time was spent counseling/coordinating care        Marquis Driver, [facility]  Internal Medicine

## 2017-12-08 NOTE — PROGRESS NOTES
12/08/17 0900   Pain Assessment   Pain Assessment 0-10   Pain Score 5   Hospital Pain Intervention(s) Medication (See MAR); Cold applied   Restrictions/Precautions   Precautions Fall Risk;THR   Weight Bearing Restrictions No   RLE Weight Bearing Per Order WBAT   General   Change In Medical/Functional Status noted R mild pitting edema, nursing and CRNP notified, Doug de luna   Cognition   Overall Cognitive Status Impaired   Arousal/Participation Cooperative   Attention Attends with cues to redirect   Orientation Level Oriented X4   Memory Decreased recall of precautions;Decreased recall of recent events;Decreased short term memory   Following Commands Follows one step commands with increased time or repetition   Subjective   Subjective Reports w mod pain   QI: Roll Left and Right   Reason if not Attempted Activity not applicable   Roll Left and Right CARE Score 9   QI: Sit to Lying   Reason if not Attempted Activity not applicable   Sit to Lying CARE Score 9   QI: Lying to Sitting on Side of Bed   Reason if not Attempted Activity not applicable   Lying to Sitting on Side of Bed CARE Score 9   QI: Sit to Stand   Assistance Needed Verbal cues; Incidental touching   Assistance Provided by [place] Less than 25%   Sit to Stand CARE Score 3   QI: Chair/Bed-to-Chair Transfer   Assistance Needed Incidental touching;Verbal cues   Assistance Provided by [place] Less than 25%   Chair/Bed-to-Chair Transfer CARE Score 3   Transfer Bed/Chair/Wheelchair   Limitations Noted In Balance; Coordination; Endurance;LE Strength   Adaptive Equipment Roller Walker   Sit to [facility]   Stand to [facility]   QI: Car Transfer   Reason if not Attempted Activity not applicable   Car Transfer CARE Score 9   QI: Walk 10 Feet   Assistance Needed Incidental touching   Assistance Provided by [place] Less than 25%   Walk 10 Feet CARE Score 3   QI: Walk 50 Feet with Two Turns   Assistance Needed Incidental touching   Assistance Provided by [place] Less than 25%   Walk 50 Feet with Two Turns CARE Score 3   QI: Walk 150 Feet   Reason if not Attempted Activity not applicable   Walk 694 Feet CARE Score 9   QI: Walking 10 Feet on Uneven Surfaces   Reason if not Attempted Activity not applicable   Walking 10 Feet on Uneven Surfaces CARE Score 9   Ambulation   Does the patient walk? 2  Yes   Primary Discharge Mode of Locomotion Walk   Walk Assist Level Contact Guard   Gait Pattern Inconsistant Ally;Decreased foot clearance;Narrow RADHA; Improper weight shift   Assist Device Roller Marlena Harrington Walked (feet) 50 ft  (x 2)   Limitations Noted In Balance; Endurance; Safety;Speed;Strength   Walking (FIM) 2 - Patient ambulates between 50 - 149 feet regardless of assist/device/set up   QI: Wheel 50 Feet with Two Turns   Reason if not Attempted Activity not applicable   Wheel 50 Feet with Two Turns CARE Score 9   QI: Wheel 150 Feet   Reason if not Attempted Activity not applicable   Wheel 892 Feet CARE Score 9   Wheelchair mobility   QI: Does the patient use a wheelchair? 0  No   QI: 1 Step (Curb)   Reason if not Attempted Activity not applicable   1 Step (Curb) CARE Score 9   QI: 4 Steps   Reason if not Attempted Activity not applicable   4 Steps CARE Score 9   QI: 12 Steps   Reason if not Attempted Activity not applicable   12 Steps CARE Score 9   QI: Picking Up Object   Reason if not Attempted Safety concerns   Picking Up Object CARE Score 88   QI: Toilet Transfer   Reason if not Attempted Activity not applicable   Toilet Transfer CARE Score 9   Therapeutic Interventions   Strengthening seated ankle pumps, LAQs x 30 ; STS x 6 cues for hand placement   Assessment   Treatment Assessment Focused on strengtheing and pain mgmgt  Pt able to perform transitions CGA<>CS level with cues for hand placement w pt insistent on using L UE for PO  Refacilitated incd APT and LE placement for STS technique    HHD ambulation trials w cues for RW proximity, inc stride length and WBOS  Pt will cont to benefit from PT services to meet functional goals  Problem List Decreased strength;Decreased range of motion;Decreased endurance; Impaired balance;Decreased mobility; Decreased safety awareness;Orthopedic restrictions;Pain   Barriers to Discharge Decreased caregiver support   PT Barriers   Physical Impairment Decreased strength;Decreased endurance; Impaired balance;Decreased coordination;Decreased safety awareness   Functional Limitation Stair negotiation;Standing;Transfers; Walking;Car transfers   Plan   Treatment/Interventions Functional transfer training;LE strengthening/ROM; Elevations; Therapeutic exercise; Endurance training;Bed mobility;Gait training   Progress Progressing toward goals   Recommendation   Recommendation Home with family support;24 hour supervision/assist   PT Therapy Minutes   PT Time In 0900   PT Time Out 0930   PT Total Time (minutes) 30   PT Mode of treatment - Individual (minutes) 30   PT Mode of treatment - Concurrent (minutes) 0   PT Mode of treatment - Group (minutes) 0   PT Mode of treatment - Co-treat (minutes) 0   PT Mode of Teatment - Total time(minutes) 30 minutes   Therapy Time missed   Time missed?  No

## 2017-12-08 NOTE — PROGRESS NOTES
Pt is resting comfortably in bed  Patient is refusing his abductor wedge at night   No complaints at this time

## 2017-12-08 NOTE — PROGRESS NOTES
12/08/17 1330   Pain Assessment   Pain Score 4   Pain Type Acute pain   Pain Location Hip   Pain Orientation Right   Restrictions/Precautions   Precautions Bed/chair alarms;Cognitive; Fall Risk;Pain;THR   QI: Sit to Stand   Assistance Needed Incidental touching   Assistance Provided by [place] 25%-49%   Sit to Stand CARE Score 3   QI: Chair/Bed-to-Chair Transfer   Assistance Needed Incidental touching   Assistance Provided by [place] 25%-49%   Chair/Bed-to-Chair Transfer CARE Score 3   Transfer Bed/Chair/Wheelchair   Limitations Noted In Balance; Coordination; Endurance;LE Strength   Adaptive Equipment Roller Walker   Findings Pt req A to lower to surface  Improvements noted w/ body mechanics and safety awareness, however still req Min vc's prn  Bed, Chair, Wheelchair Transfer (FIM) 3 - [place] needs to lift, boost or assist to stand OR sit   Assessment   Treatment Assessment Pt seen for skilled OT session focusing on fxnl xfers and formal cognitive assessment  Pt completed Salvatore Cognitive Assessment (MOCA) version 7 1  Pt scored overall 12/30 indicating a moderate cognitive deficit  Visuospatial/executive: 0/5; Naming: 3/3; Attention: 2/6; Language: 1/3; Abstraction: 0/2; Delayed recall: 0/5; Orientation: 6/6  Assessment demonstrates limitations in memory, attention, and problem-solving  Pt stated, "I don't really need to remember anything anymore " Improvements noted w/ safety awareness during fxnl xfers, however cont to req vc's prn  Pt cont to be limited by dec strength, overall endurance, dec standing tolerance, poor activity tolerance, impaired balance, poor righting reaction, and poor safety awareness  Pt will cont to benefit from OT POC to maximize indep  Prognosis Good   Problem List Decreased strength;Decreased range of motion;Decreased endurance; Impaired balance;Decreased mobility; Decreased safety awareness;Orthopedic restrictions;Pain   Plan   Treatment/Interventions ADL retraining;Functional transfer training; Therapeutic exercise; Endurance training;Patient/family training;Equipment eval/education; Bed mobility;Gait training   Progress Progressing toward goals   OT Therapy Minutes   OT Time In 1330   OT Time Out 1400   OT Total Time (minutes) 30   OT Mode of treatment - Individual (minutes) 30   OT Mode of treatment - Concurrent (minutes) 0   OT Mode of treatment - Group (minutes) 0   OT Mode of treatment - Co-treat (minutes) 0   OT Mode of Teatment - Total time(minutes) 30 minutes   Therapy Time missed   Time missed?  No

## 2017-12-08 NOTE — PROGRESS NOTES
12/08/17 1230   Pain Assessment   Pain Assessment No/denies pain   Hospital Pain Intervention(s) Medication (See MAR)   Restrictions/Precautions   Precautions Cognitive; Fall Risk;Pain   Weight Bearing Restrictions No   RLE Weight Bearing Per Order WBAT   Cognition   Overall Cognitive Status Impaired   Arousal/Participation Cooperative   Attention Attends with cues to redirect   Orientation Level Oriented X4   Memory Decreased recall of precautions;Decreased recall of recent events;Decreased short term memory   Following Commands Follows one step commands with increased time or repetition   Subjective   Subjective Pt states with no new c/o   QI: Roll Left and Right   Reason if not Attempted Activity not applicable   Roll Left and Right CARE Score 9   QI: Sit to [address] Provided by [place] No physical assistance   Sit to Lying CARE Score 4   QI: Lying to Sitting on Side of Bed   Assistance Needed Verbal cues; Supervision   Assistance Provided by [place] No physical assistance   Lying to Sitting on Side of Bed CARE Score 4   QI: Sit to [address] Provided by [place] No physical assistance   Sit to Stand CARE Score 4   Bed Mobility   Findings S pt provided w leg  w verbal cues able to get on/off hi-lo mat   QI: Chair/Bed-to-Chair Transfer   Assistance Needed Supervision   Assistance Provided by [place] No physical assistance   Chair/Bed-to-Chair Transfer CARE Score 4   Transfer Bed/Chair/Wheelchair   Limitations Noted In Balance; Coordination;LE Strength   Adaptive Equipment Roller Walker   Stand Pivot Supervision   Sit to Stand Supervision   Stand to Sit Supervision   Supine to Sit Supervision   Sit to Supine Supervision   Bed, Chair, Wheelchair Transfer (FIM) 5 - Patient requires supervision/monitoring   QI: Car Transfer   Reason if not Attempted Activity not applicable   Car Transfer CARE Score 9   QI: Walk 10 Feet   Assistance Needed Incidental touching   Assistance Provided by [place] Less than 25%   Walk 10 Feet CARE Score 3   QI: Walk 50 Feet with Two Turns   Assistance Needed Incidental touching   Assistance Provided by [place] No physical assistance   Walk 50 Feet with Two Turns CARE Score 4   QI: Walk 150 Feet   Reason if not Attempted Activity not applicable   Walk 036 Feet CARE Score 9   QI: Walking 10 Feet on Uneven Surfaces   Reason if not Attempted Activity not applicable   Walking 10 Feet on Uneven Surfaces CARE Score 9   Ambulation   Does the patient walk? 2  Yes   Primary Discharge Mode of Locomotion Walk   Walk Assist Level Contact Guard   Gait Pattern Inconsistant Ally;Decreased foot clearance;Narrow RADHA   Assist Device Roller Walker   Distance Walked (feet) 50 ft  (- 60 x 2)   Limitations Noted In Balance; Endurance; Safety;Speed;Strength   Findings cues for RW proximity, postural alignment, inc stride length   Walking (FIM) 2 - Patient ambulates between 50 - 149 feet regardless of assist/device/set up   QI: Wheel 50 Feet with Two Turns   Reason if not Attempted Activity not applicable   Wheel 50 Feet with Two Turns CARE Score 9   QI: Wheel 150 Feet   Reason if not Attempted Activity not applicable   Wheel 065 Feet CARE Score 9   Wheelchair mobility   QI: Does the patient use a wheelchair? 0   No   QI: 1 Step (Curb)   Reason if not Attempted Activity not applicable   1 Step (Curb) CARE Score 9   QI: 4 Steps   Reason if not Attempted Activity not applicable   4 Steps CARE Score 9   QI: 12 Steps   Reason if not Attempted Activity not applicable   12 Steps CARE Score 9   QI: Picking Up Object   Reason if not Attempted Activity not applicable   Picking Up Object CARE Score 9   QI: Toilet Transfer   Reason if not Attempted Activity not applicable   Toilet Transfer CARE Score 9   Therapeutic Interventions   Strengthening supine PROM SLR flex, ABd x 20 ea; SAQs w 3 sec hold   Flexibility B manual stretch HS, hip add x 5-6 '    Equipment Use   NuStep L1 x 10'   Other Comments   Comments C/o dizziness during first ambulation trial BP checked seated 130/59 standing 113/57 nursing notified no further c/o dizziness  Assessment   Treatment Assessment Focused on endurance, strength and coordination throguh functioanl therapuetic actvities  Pt able to perform S transitons however requires CGA duign ambulation due to FF and WBing on UEs while walking  Cues for ambhulating incd w LEs not w UEs  Pt able to complete bed mobility w leg lifetr S  Mat ther ex as noted above  Recevied CP appplication during mat program   Pt will cont to benefit from PT service to meet functional goals  Problem List Decreased strength;Decreased range of motion;Decreased endurance; Impaired balance;Decreased mobility; Decreased safety awareness;Orthopedic restrictions;Pain   Barriers to Discharge Decreased caregiver support   PT Barriers   Physical Impairment Decreased strength;Decreased endurance; Impaired balance;Decreased coordination;Decreased safety awareness   Functional Limitation Stair negotiation;Standing;Transfers; Walking;Car transfers   Plan   Treatment/Interventions Functional transfer training;LE strengthening/ROM; Therapeutic exercise; Endurance training;Bed mobility;Gait training   Progress Progressing toward goals   Recommendation   Recommendation Home with family support   PT Therapy Minutes   PT Time In 1230   PT Time Out 1330   PT Total Time (minutes) 60   PT Mode of treatment - Individual (minutes) 60   PT Mode of treatment - Concurrent (minutes) 0   PT Mode of treatment - Group (minutes) 0   PT Mode of treatment - Co-treat (minutes) 0   PT Mode of Teatment - Total time(minutes) 60 minutes   Therapy Time missed   Time missed?  No

## 2017-12-08 NOTE — PROGRESS NOTES
12/08/17 0930   Pain Assessment   Pain Assessment 0-10   Pain Score 4   Pain Type Acute pain   Pain Location Hip   Pain Orientation Right   Restrictions/Precautions   Precautions Cognitive; Fall Risk;Pain;THR   Weight Bearing Restrictions No   RLE Weight Bearing Per Order WBAT   Dressing/Undressing Clothing   Remove LB Clothes Pants;Socks   Findings Pt used LHAE to thread BLE into pants while seated in w/c  Pt req vc's to ease use of reacher, pt nikita MARTINEZ understanding  Pt donned and doffed socks w/ increased time to complete and Min vc's to use AE more effectively  Pt reports family has [place] to use at d/c, pt asked to confirm w/ family  QI: Sit to Stand   Assistance Needed Incidental touching;Verbal cues; Adaptive equipment   Assistance Provided by [place] 25%-49%   Sit to Stand CARE Score 3   QI: Chair/Bed-to-Chair Transfer   Assistance Needed Incidental touching   Assistance Provided by [place] 25%-49%   Chair/Bed-to-Chair Transfer CARE Score 3   Transfer Bed/Chair/Wheelchair   Limitations Noted In Balance; Coordination; Endurance;LE Strength   Adaptive Equipment Roller [employer], Chair, Wheelchair Transfer (FIM) 3 - [place] needs to lift, boost or assist to stand OR sit   Kitchen Mobility   Kitchen-Mobility Level Walker   Kitchen Activity Retrieve items;Transport items   Kitchen Mobility Comments Pt completed kitchen mobility w/ RW, retrieving and transporting items  Pt nikita MARTINEZ safety awareness when amulating and for overhead fxnl reach  Pt edu on RW managemnt in kitchen and encouraged to slide items on counter rather than carry them to inc safety and dec fall risk  Pt reports SO does all of meal prep, however he gets snacks prn  Functional Standing Tolerance   Time 4 minutes x2 trials   Activity static standing balance   Comments Pt participated in pegboard activity while standing at table w/ unilateral support to inc standing tolerance and static standing balance  Pt tolerated well w/ extensive rest breaks req  Pt reports no pain during activity  Pt req increased time to complete sit>stand, however demo improved standing balance at table  Right Upper Extremity- Strength   R Shoulder Flexion;ABduction; Extension;Horizontal ABduction; External rotation; Internal rotation   R Elbow Elbow flexion;Elbow extension   R Position Seated   Equipment Dowel   R Weight/Reps/Sets 2#/10/3   Left Upper Extremity-Strength   L Shoulder Flexion;ABduction; Extension;Horizontal ABduction; External rotation; Internal rotation   L Elbow Elbow flexion;Elbow extension   L Position Seated   Equipment Dowel   L Weights/Reps/Sets 2#/10/3   Assessment   Treatment Assessment Pt seen for skilled OT session focusing on kitchen mobility, BUE ther ex, LHAE training, fxnl xfer training, and standing tolerance  Pt tolerated session well w/ rest breaks prn  Pt able to recall 3/3 THP during session  Pt cont to be limited by poor activity tolerance, impaired balance, dec strength, and poor problem-solving  Pt would benefit from [place] [address]  8  training, balance training, and safety awareness training to maximize indep  Problem List Decreased strength;Decreased range of motion;Decreased endurance; Impaired balance;Decreased mobility; Decreased safety awareness;Orthopedic restrictions;Pain   Plan   Treatment/Interventions ADL retraining;Functional transfer training; Therapeutic exercise; Endurance training;Patient/family training;Equipment eval/education; Bed mobility;Gait training   Progress Progressing toward goals   OT Therapy Minutes   OT Time In 0930   OT Time Out 1030   OT Total Time (minutes) 60   OT Mode of treatment - Individual (minutes) 60   OT Mode of treatment - Concurrent (minutes) 0   OT Mode of treatment - Group (minutes) 0   OT Mode of treatment - Co-treat (minutes) 0   OT Mode of Teatment - Total time(minutes) 60 minutes   Therapy Time missed   Time missed?  No

## 2017-12-09 LAB
GLUCOSE SERPL-MCNC: 100 MG/DL (ref 65–140)
GLUCOSE SERPL-MCNC: 142 MG/DL (ref 65–140)
GLUCOSE SERPL-MCNC: 149 MG/DL (ref 65–140)
GLUCOSE SERPL-MCNC: 210 MG/DL (ref 65–140)
INR PPP: 2.01 (ref 0.86–1.16)
PROTHROMBIN TIME: 23 SECONDS (ref 12.1–14.4)

## 2017-12-09 PROCEDURE — 97535 SELF CARE MNGMENT TRAINING: CPT | Performed by: OCCUPATIONAL THERAPY ASSISTANT

## 2017-12-09 PROCEDURE — 92526 ORAL FUNCTION THERAPY: CPT

## 2017-12-09 PROCEDURE — 85610 PROTHROMBIN TIME: CPT | Performed by: NURSE PRACTITIONER

## 2017-12-09 PROCEDURE — 97530 THERAPEUTIC ACTIVITIES: CPT

## 2017-12-09 PROCEDURE — 82948 REAGENT STRIP/BLOOD GLUCOSE: CPT

## 2017-12-09 PROCEDURE — 97110 THERAPEUTIC EXERCISES: CPT

## 2017-12-09 PROCEDURE — 97116 GAIT TRAINING THERAPY: CPT

## 2017-12-09 RX ORDER — WARFARIN SODIUM 6 MG/1
6 TABLET ORAL
Status: DISCONTINUED | OUTPATIENT
Start: 2017-12-09 | End: 2017-12-13

## 2017-12-09 RX ADMIN — METOPROLOL TARTRATE 75 MG: 50 TABLET ORAL at 21:28

## 2017-12-09 RX ADMIN — GLYBURIDE 5 MG: 5 TABLET ORAL at 08:29

## 2017-12-09 RX ADMIN — AMLODIPINE BESYLATE 10 MG: 10 TABLET ORAL at 08:36

## 2017-12-09 RX ADMIN — METOPROLOL TARTRATE 75 MG: 50 TABLET ORAL at 08:35

## 2017-12-09 RX ADMIN — FOLIC ACID-PYRIDOXINE-CYANOCOBALAMIN TAB 2.5-25-2 MG 1 TABLET: 2.5-25-2 TAB at 08:36

## 2017-12-09 RX ADMIN — LISINOPRIL 40 MG: 20 TABLET ORAL at 08:35

## 2017-12-09 RX ADMIN — DOXAZOSIN 4 MG: 4 TABLET ORAL at 08:36

## 2017-12-09 RX ADMIN — WARFARIN SODIUM 6 MG: 6 TABLET ORAL at 17:58

## 2017-12-09 RX ADMIN — ACETAMINOPHEN 975 MG: 325 TABLET, FILM COATED ORAL at 21:27

## 2017-12-09 RX ADMIN — ENOXAPARIN SODIUM 105 MG: 120 INJECTION SUBCUTANEOUS at 08:30

## 2017-12-09 RX ADMIN — ASPIRIN 81 MG 324 MG: 81 TABLET ORAL at 08:36

## 2017-12-09 RX ADMIN — POLYETHYLENE GLYCOL 3350 17 G: 17 POWDER, FOR SOLUTION ORAL at 08:36

## 2017-12-09 RX ADMIN — INSULIN LISPRO 1 UNITS: 100 INJECTION, SOLUTION INTRAVENOUS; SUBCUTANEOUS at 21:28

## 2017-12-09 RX ADMIN — ACETAMINOPHEN 975 MG: 325 TABLET, FILM COATED ORAL at 06:47

## 2017-12-09 RX ADMIN — ACETAMINOPHEN 975 MG: 325 TABLET, FILM COATED ORAL at 13:33

## 2017-12-09 NOTE — PROGRESS NOTES
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/multiple areas intracranial atherosclerosis/severe stenosis inferior Rt MCA:  Continue ASA  Resume Plavix once pt completes a 6 month course of anticoagulation for Lt UE DVT  No statin as pt had hives in the past  Getting an API on 12/13/17    2  Rt hip revision at [facility] on 11/20/17:  Pain control per primary service  Monitor incision  Follow-up with [facility]  3   Rt hip hematoma: occurred after getting tPA  Hemoglobin stable    4  Acute blood loss anemia;  s/p multiple transfusions:  Stable  5   Lt UE DVT:  on therapeutic Lovenox until INR > 2 = stop today since INR is 2 0  Was on Coumadin 5mg qd and increased to 7 5 mg qd 2 nights ago = since INRs taking big jumps will drop dose back to 6mg and get INR again in AM   Thrombosis Panel: + lupus anticoagulant, mildly decreased antithrombin III, PTT-LA mixing study prolonged at 53 sec, and hexagonal phase phospholipid elevated at 16  Pt will need Hematology follow-up and recheck lupus anticoagulant in 12 weeks    6  CAD with CONCE[facility]:  Continue full ASA  For API 12/13/17    7  HTN: was not always optimally controlled on Amlodipine 10mg daily, Cardura 4mg daily, Lisinopril 40mg daily and Lopressor 50mg every 12 hours so on 12/7/17  increased Lopressor to 75mg q12hrs  No changes today    8  CKD stage III; baseline Cr 1 4-1 5: stable; Will monitor  9   DM type 2:  HbA1C in April 7 3  Pt takes Glyburide 5mg 2 tabs in the AM and 1 tab in the PM   Pt has been on insulin coverage and 10U Lantus at bedtime  Added back glyburide 5mg qAM 12/8 and stopped Lantus  , 211, 100, 97; fasting today 100/lunch 149    10    Vit D deficiency:  started 50,000U weekly x 6 and then 1000U/day      Subjective: Patient seen and examined  No new or overnight issues     ROS:   GI: denies abdominal pain, change bowel habits or reflux symptoms  Neuro: No new neurologic changes  Respiratory: No Cough, SOB  Cardiovascular: No CP, palpitations     Scheduled Meds:    acetaminophen 975 mg Oral Q8H [address]   amLODIPine 10 mg Oral Daily   aspirin 324 mg Oral Daily   ergocalciferol 50,000 Units Oral Weekly   Followed by      Faye Lay ON 1/17/2018] cholecalciferol 1,000 Units Oral Daily   doxazosin 4 mg Oral Daily   enoxaparin 1 mg/kg Subcutaneous F65F [address]   folic acid-pyridoxine-cyanocobalamin 1 tablet Oral Daily   glyBURIDE 5 mg Oral Daily With Breakfast   insulin lispro 1-5 Units Subcutaneous TID AC   insulin lispro 1-5 Units Subcutaneous HS   lisinopril 40 mg Oral Daily   metoprolol tartrate 75 mg Oral Q12H [address]   polyethylene glycol 17 g Oral Daily   warfarin 7 5 mg Oral Daily (warfarin)       Labs:       Results from last 7 days  Lab Units 12/08/17  0554 12/06/17  0523  12/05/17  0543   WBC Thousand/uL 6 01  --   --  8 00   HEMOGLOBIN g/dL 8 2* 8 3*  < > 8 2*   HEMATOCRIT % 25 0* 25 6*  < > 24 9*   PLATELETS Thousands/uL 387  --   --  452*   < > = values in this interval not displayed      Results from last 7 days  Lab Units 12/08/17  0554 12/05/17  0543   SODIUM mmol/L 134* 136   POTASSIUM mmol/L 4 5 4 3   CHLORIDE mmol/L 102 105   CO2 mmol/L 26 25   BUN mg/dL 27* 22   CREATININE mg/dL 1 38* 1 35*   GLUCOSE RANDOM mg/dL 130 129   CALCIUM mg/dL 8 6 8 8           Results from last 7 days  Lab Units 12/09/17  0558 12/08/17  0554   INR  2 01* 1 58*          Glucose (mg/dL)   Date Value   12/08/2017 130   12/05/2017 129   12/01/2017 127   11/30/2017 258 (H)   12/01/2015 141 (H)   07/14/2015 124 (H)   09/11/2014 116 (H)   01/09/2014 134 (H)     Glucose, i-STAT (mg/dl)   Date Value   11/24/2017 171 (H)       Labs reviewed    Physical Examination:  Vitals:   Vitals:    12/08/17 2057 12/09/17 0552 12/09/17 0835 12/09/17 0836   BP: 146/72 133/62 155/76 155/76 Pulse: 71 67 68    Resp: 20 20     Temp: (!) 97 3 °F (36 3 °C) 98 6 °F (37 °C)     TempSrc: Oral Oral     SpO2: 99% 96%     Weight:       Height:           GEN: NAD  HEENT: NC/AT  RESP: BBS w/o crackles/wheeze/rhonci; resp unlabored  CV: +S1 S2, regular rate, no rubs/murmurs  ABD: soft, NT, ND, normal BS   : no villarreal  EXT: mild RLE edema = continue TEDS  Skin: no rashes  Neuro: AAO, limited movement of Right leg 2/2 pain/surgery otherwise rest of extremities 5/5      [x ] Total time spent: 30 Mins and greater than 50% of this time was spent counseling/coordinating care        Shwetha Junior, [address]   Internal Medicine

## 2017-12-09 NOTE — PROGRESS NOTES
12/09/17 1105   Pain Assessment   Pain Assessment No/denies pain   Pain Score No Pain   Restrictions/Precautions   Precautions Bed/chair alarms; Fall Risk   Swallow Information   Current Risks for Dysphagia & Aspiration Recent intubation;General debilitation;New Neuro event   Current Diet Regular; Thin liquid   Baseline Diet Regular; Thin liquids   Consistencies Assessed and Performance   Materials Admnistered Mechanical Soft/Level 2;Soft/Level 3;Regular/Solid; Thin liquid   Oral Stage Mild impaired   Phargngeal Stage WFL   Swallow Mechanics Swallow initation; Appears prompt;Good Larygneal rise   Esophageal Concerns No s/s reported   Recommendations   Risk for Aspiration None   Diet Solid Recommendation Regular consistency   Diet Liquid Recommendation Thin liquid   Recommended Form of Meds As desired; As tolerated   General Precautions Remain upright for 45 mins after meals   Compensatory Swallowing Strategies Alternate solids and liquids   Results Reviewed with RN;PT/Family/Caregiver   QI: [id number] Provided by [place] No physical assistance   Eating CARE Score 6   Swallow Assessment   Swallow Treatment Assessment Pt seen for f/u dysphagia tx following regular/thin diet upgrade on eval  Pt's lunch tray consisted of chicken salad sandiwch w/lettuce and tomato, side salad, fresh melon pieces, cupcake, and thin coffee via cup and water via straw  Pt was independent w/tray set-up and self-feeding w/noted larger bite size; however, while mastication noted to be mildly prolonged, it was fully functional for bolus formation  AP transfer appeared mildly delayed w/larger bolus size but swallow initiation of solids and liquids appeared prompt  Hyolaryngeal excursion believed to be good during palpation w/no overt s/sx aspiration/penetration w/any consistencies  As pt is tolerating regular/thin liquid diet, no further f/u warranted at this time  Pt and nsg both in agreement w/POC  Swallow Assessment Prognosis   Prognosis Considerations Co-morbidities; Medical prognosis; Ability to carry over   SLP Therapy Minutes   SLP Time In 3792   SLP Time Out 1135   SLP Total Time (minutes) 30   SLP Mode of treatment - Individual (minutes) 30   SLP Mode of treatment - Concurrent (minutes) 0   SLP Mode of treatment - Group (minutes) 0   SLP Mode of treatment - Co-treat (minutes) 0   SLP Mode of Teatment - Total time(minutes) 30 minutes   Therapy Time missed   Time missed?  No   Daily FIM Score   Eating (FIM) 6 - Patient requires increased time or safety concern

## 2017-12-09 NOTE — PROGRESS NOTES
12/09/17 1000   Pain Assessment   Pain Assessment No/denies pain   Pain Score No Pain   Restrictions/Precautions   Precautions Bed/chair alarms; Fall Risk   Weight Bearing Restrictions No   RLE Weight Bearing Per Order WBAT   QI: Oral Hygiene   Assistance Needed Set-up / [address] [place] Provided by [place] No physical assistance   Oral Hygiene CARE Score 5   Grooming   Able To Initiate Tasks; Acquire Items;Comb/Brush Hair;Wash/Dry Face;Brush/Clean Teeth;Wash/Dry Hands   Limitation Noted In Problem Solving; Safety;Strength   Findings seated at sink    Grooming (FIM) 5 - [place] sets up supplies or applies device   QI: Shower/Bathe Self   Assistance Needed Physical assistance   Assistance Provided by [place] 25%-49%   Shower/Bathe Self CARE Score 3   Bathing   Assessed Bath Style Shower   Anticipated D/C Bath Style Shower   Able to [employer] No   Able to [employer] Temperature No   Able to Wash/Rinse/Dry (body part) Left Arm;Right Arm;L Upper Leg;R Upper Leg;L Lower Leg/Foot;R Lower Leg/Foot;Chest;Abdomen;Perineal Area; Buttocks   Limitations Noted in Balance; Endurance;Problem Solving;ROM;Safety;Strength   Positioning Seated;Standing   Adaptive Equipment Longhand Reacher; Shower Seat;[employer]  Pt utilized reacher for washing LB due to bending precautions  Bathing (FIM) 4 - Patient completes 8/10 or 9/10 parts   Tub/Shower Transfer   Limitations Noted In Balance; Endurance;ROM;Safety   Adaptive Equipment Grab Bars;Seat with Back   Assessed Shower   Findings Pt given CGA for shower transfer using grab bars for stabilizing      Shower Transfer (FIM) 4 - Patient requires steadying assist or light touching   QI: Upper Body Dressing   Assistance Needed Set-up / [address] [place] Provided by [place] No physical assistance   Upper Body Dressing CARE Score 5   QI: Lower Body Dressing   Assistance Needed Incidental touching   Assistance Provided by [place] No physical assistance   Dalia Villalba Lower Body Dressing CARE Score 4   QI: Putting On/Taking Off Footwear   Assistance Needed Adaptive equipment; Incidental touching   Assistance Provided by [place] Less than 25%   Comment reacher    Putting On/Taking Off Footwear CARE Score 3   QI: Picking Up Object   Reason if not Attempted Activity not applicable   Picking Up Object CARE Score 9   Dressing/Undressing Clothing   Remove UB Clothes Other  (hospital gown )   Remove LB Clothes Undergarment;Socks   [address] LB Clothes Pants; Undergarment;TEDs; Shoes   Limitations Noted In Balance; Endurance;Problem Solving; Safety;Strength;ROM   Adaptive Equipment Reacher   Findings Pt completed clothing management using reacher donning right leg first demonstrating good safety and use of AE  UB Dressing (FIM) 5 - [place] sets up supplies or applies device   LB Dressing (FIM) 4 - Patient requires steadying assist or light touching   QI: Roll Left and Right   Assistance Needed Incidental touching   Assistance Provided by [place] Less than 25%   Roll Left and Right CARE Score 3   QI: Lying to Sitting on Side of Bed   Assistance Needed Verbal cues; Incidental touching   Assistance Provided by [place] No physical assistance   Lying to Sitting on Side of Bed CARE Score 4   QI: Sit to Stand   Assistance Needed Incidental touching   Assistance Provided by [place] No physical assistance   Comment RW   Sit to Stand CARE Score 4   Bed Mobility   Rolling R 4  Minimal assistance   Rolling L 4  Minimal assistance   Supine to Sit 4  Minimal assistance   QI: Chair/Bed-to-Chair Transfer   Assistance Needed Adaptive equipment; Incidental touching   Assistance Provided by [place] No physical assistance   Chair/Bed-to-Chair Transfer CARE Score 4   Transfer Bed/Chair/Wheelchair   Limitations Noted In Balance; Endurance;Problem Solving;LE Strength   Adaptive Equipment Roller [employer], Chair, Wheelchair Transfer (FIM) 4 - Patient requires steadying assist or light touching   QI: [address] Needed Incidental touching   Assistance Provided by [place] Less than 25%   Toileting Hygiene CARE Score 3   Toileting   Able to [address] down yes, up yes  Able to [address] Yes   Manage Hygiene Bladder   Limitations Noted In Balance;ROM;Safety;LE Strength   Adaptive Equipment (RW)   Toileting (FIM) 4 - Patient requires steadying assist or light touching   QI: Toilet Transfer   Assistance Needed Incidental touching   Assistance Provided by [place] Less than 25%   Toilet Transfer CARE Score 3   Toilet Transfer   Surface Assessed Standard Toilet   Transfer Technique Standard   Limitations Noted In Balance; Endurance   Adaptive Equipment (RW)   Findings Pt utilized RW for standing for toileting  Toilet Transfer (FIM) 4 - Patient requires steadying assist or light touching   Cognition   Overall Cognitive Status Impaired   Arousal/Participation Alert; Cooperative   Attention Attends with cues to redirect   Orientation Level Oriented X4   Memory Within functional limits   Following Commands Follows all commands and directions without difficulty   Activity Tolerance   Activity Tolerance Patient tolerated treatment well   Assessment   Treatment Assessment Pt session focused on ADLs with setup provided using LHAE  Pt tolerated session well CGA for standing to complete bottom hygiene  Pt educated on safety with standing and locking brakes on w/c prior to standing  Pt utilizes [address] [place] [address] with good safety and carryover and would benefit continued skilled OT with focus on safety with all functional skills to maximize functional abilities  Please see above for more information on selfcare skills  Prognosis Good   Problem List Decreased strength;Decreased range of motion;Decreased endurance; Impaired balance;Decreased mobility   Barriers to Discharge Inaccessible home environment;Decreased caregiver support   Plan   Treatment/Interventions ADL retraining;Functional transfer training;LE strengthening/ROM; Therapeutic exercise; Endurance training;Bed mobility   Progress Progressing toward goals   Recommendation   OT Discharge Recommendation Home with family support   OT Therapy Minutes   OT Time In 1000   OT Time Out 1105   OT Total Time (minutes) 65   OT Mode of treatment - Individual (minutes) 65   OT Mode of treatment - Concurrent (minutes) 0   OT Mode of treatment - Group (minutes) 0   OT Mode of treatment - Co-treat (minutes) 0   OT Mode of Teatment - Total time(minutes) 65 minutes   Therapy Time missed   Time missed?  No

## 2017-12-09 NOTE — PROGRESS NOTES
12/09/17 1230   Pain Assessment   Pain Assessment 0-10   Pain Score 5   Pain Type Acute pain   Pain Location Hip   Pain Orientation Right   Restrictions/Precautions   Precautions Bed/chair alarms; Fall Risk   RLE Weight Bearing Per Order WBAT   Subjective   Subjective pt agreeable to perform PT session    Bed Mobility   Findings S   Transfer Bed/Chair/Wheelchair   Limitations Noted In Balance; Coordination;LE Strength   Adaptive Equipment Roller Walker   All Transfer Supervision;Contact [facility], Chair, Wheelchair Transfer (FIM) 4 - Patient requires steadying assist or light touching   Ambulation   Does the patient walk? 2  Yes   Walk Assist Level Contact Guard   Gait Pattern Inconsistant Ally;Decreased foot clearance; Improper weight shift;Narrow RADHA; Forward Flexion   Assist Device Roller Walker   Distance Walked (feet) 60 ft  (x3)   Limitations Noted In Balance;Device Management; Endurance; Heel Strike;Posture; Safety;Speed; Sequencing   Findings cues for RW  and posture ccontrol and weight to his RLE    Walking (FIM) 2 - Patient ambulates between 50 - 149 feet regardless of assist/device/set up   Stairs   Type Stairs;Curb   # of Steps 6   Weight Bearing Precautions Fall Risk   Assist Devices Bilateral Rail;Roller Walker   Findings progressing vc's needs for sequence   Stairs (FIM) 2 - Patient goes up and down 4 - 11 stairs regardless of assist/device/setup   Toilet Transfer   Surface Assessed Standard Toilet   Limitations Noted In Balance; Endurance; Safety   Adaptive Equipment Grab Bar   Positioning Concerns Safety   Toilet Transfer (FIM) 4 - Patient requires steadying assist or light touching   Equipment Use   NuStep level 2 for 11 min   Assessment   Treatment Assessment progressing toward functional activities , thex ex's and gait training with RW , limited with overall endurance d/t RLE pain level and stiffness    Pt leaning to his LLE d/t RLE pain in thigh areea also pt needs rest breaks post thex ex's , Ice left leg and hip post treatment   Pt will cont to benefit with PT    Problem List Decreased strength;Decreased endurance; Impaired balance;Orthopedic restrictions;Pain;Decreased skin integrity; Decreased safety awareness; Impaired judgement;Decreased coordination;Decreased mobility   Barriers to Discharge Decreased caregiver support   Plan   Treatment/Interventions Functional transfer training;LE strengthening/ROM; Therapeutic exercise;Elevations; Endurance training;Patient/family training;Bed mobility;Gait training   Progress Progressing toward goals   PT Therapy Minutes   PT Time In 1230   PT Time Out 1400   PT Total Time (minutes) 90   PT Mode of treatment - Individual (minutes) 90   PT Mode of treatment - Concurrent (minutes) 0   PT Mode of treatment - Group (minutes) 0   PT Mode of treatment - Co-treat (minutes) 0   PT Mode of Teatment - Total time(minutes) 90 minutes   Therapy Time missed   Time missed?  No

## 2017-12-10 LAB
GLUCOSE SERPL-MCNC: 104 MG/DL (ref 65–140)
GLUCOSE SERPL-MCNC: 145 MG/DL (ref 65–140)
GLUCOSE SERPL-MCNC: 151 MG/DL (ref 65–140)
GLUCOSE SERPL-MCNC: 167 MG/DL (ref 65–140)
INR PPP: 2.5 (ref 0.86–1.16)
PROTHROMBIN TIME: 27.3 SECONDS (ref 12.1–14.4)

## 2017-12-10 PROCEDURE — 97110 THERAPEUTIC EXERCISES: CPT

## 2017-12-10 PROCEDURE — 85610 PROTHROMBIN TIME: CPT | Performed by: NURSE PRACTITIONER

## 2017-12-10 PROCEDURE — 82948 REAGENT STRIP/BLOOD GLUCOSE: CPT

## 2017-12-10 PROCEDURE — 97116 GAIT TRAINING THERAPY: CPT

## 2017-12-10 PROCEDURE — 97530 THERAPEUTIC ACTIVITIES: CPT

## 2017-12-10 RX ADMIN — ACETAMINOPHEN 975 MG: 325 TABLET, FILM COATED ORAL at 21:55

## 2017-12-10 RX ADMIN — ASPIRIN 81 MG 324 MG: 81 TABLET ORAL at 08:52

## 2017-12-10 RX ADMIN — AMLODIPINE BESYLATE 10 MG: 10 TABLET ORAL at 08:52

## 2017-12-10 RX ADMIN — METOPROLOL TARTRATE 75 MG: 50 TABLET ORAL at 21:55

## 2017-12-10 RX ADMIN — TRAMADOL HYDROCHLORIDE 50 MG: 50 TABLET, FILM COATED ORAL at 08:52

## 2017-12-10 RX ADMIN — DOXAZOSIN 4 MG: 4 TABLET ORAL at 08:52

## 2017-12-10 RX ADMIN — ACETAMINOPHEN 975 MG: 325 TABLET, FILM COATED ORAL at 14:18

## 2017-12-10 RX ADMIN — METOPROLOL TARTRATE 75 MG: 50 TABLET ORAL at 08:52

## 2017-12-10 RX ADMIN — FOLIC ACID-PYRIDOXINE-CYANOCOBALAMIN TAB 2.5-25-2 MG 1 TABLET: 2.5-25-2 TAB at 08:52

## 2017-12-10 RX ADMIN — ACETAMINOPHEN 975 MG: 325 TABLET, FILM COATED ORAL at 06:02

## 2017-12-10 RX ADMIN — LISINOPRIL 40 MG: 20 TABLET ORAL at 08:52

## 2017-12-10 RX ADMIN — INSULIN LISPRO 1 UNITS: 100 INJECTION, SOLUTION INTRAVENOUS; SUBCUTANEOUS at 16:42

## 2017-12-10 RX ADMIN — GLYBURIDE 5 MG: 5 TABLET ORAL at 08:53

## 2017-12-10 RX ADMIN — INSULIN LISPRO 1 UNITS: 100 INJECTION, SOLUTION INTRAVENOUS; SUBCUTANEOUS at 11:56

## 2017-12-10 RX ADMIN — WARFARIN SODIUM 6 MG: 6 TABLET ORAL at 17:36

## 2017-12-10 NOTE — PROGRESS NOTES
12/10/17 1230   Pain Assessment   Pain Assessment No/denies pain   Pain Score No Pain   Restrictions/Precautions   Precautions Bed/chair alarms; Fall Risk   Weight Bearing Restrictions No   RLE Weight Bearing Per Order WBAT   Cognition   Overall Cognitive Status Impaired   Arousal/Participation Alert; Cooperative   Attention Attends with cues to redirect   Orientation Level Oriented X4   Memory Within functional limits   Following Commands Follows all commands and directions without difficulty   Subjective   Subjective Patient tolerated therapy very well without any c/o pain  QI: Roll Left and Right   Reason if not Attempted Activity not applicable   Roll Left and Right CARE Score 9   QI: Sit to Lying   Reason if not Attempted Activity not applicable   Sit to Lying CARE Score 9   QI: Lying to Sitting on Side of Bed   Reason if not Attempted Activity not applicable   Lying to Sitting on Side of Bed CARE Score 9   QI: Sit to Stand   Assistance Needed Supervision   Assistance Provided by [place] No physical assistance   Sit to Stand CARE Score 4   QI: Chair/Bed-to-Chair Transfer   Assistance Needed Supervision   Assistance Provided by [place] No physical assistance   Chair/Bed-to-Chair Transfer CARE Score 4   Transfer Bed/Chair/Wheelchair   Limitations Noted In Balance; Endurance;LE Strength   Adaptive Equipment Roller Walker   Stand Pivot Supervision   Sit to Stand Supervision   Stand to YAMILETH GILBERT  [place], Chair, Wheelchair Transfer (FIM) 5 - Patient requires supervision/monitoring   QI: Car Transfer   Reason if not Attempted Activity not applicable   Car Transfer CARE Score 9   QI: Walk 10 Feet   Assistance Needed Incidental touching   Assistance Provided by [place] Less than 25%   Walk 10 Feet CARE Score 3   QI: Walk 50 Feet with Two Turns   Assistance Needed Incidental touching   Assistance Provided by [place] Less than 25%   Walk 50 Feet with Two Turns CARE Score 3   QI: Walk 150 Feet   Assistance Needed Incidental touching   Assistance Provided by [place] Less than 25%   Walk 150 Feet CARE Score 3   QI: Walking 10 Feet on Uneven Surfaces   Reason if not Attempted Activity not applicable   Walking 10 Feet on Uneven Surfaces CARE Score 9   Ambulation   Does the patient walk? 2  Yes   Primary Discharge Mode of Locomotion Walk   Walk Assist Level Contact Guard   Gait Pattern Inconsistant Ally; Slow Ally;Decreased foot clearance; Forward Flexion;Narrow RADHA; Improper weight shift   Assist Device Roller Walker   Distance Walked (feet) 151 ft  (151 feet x 2)   Limitations Noted In Balance; Endurance;Speed;Strength;Swing   Walking (FIM) 4 - Patient requires steadying assist or light touching AND distance 150 feet or more, no rest   QI: Wheel 50 Feet with Two Turns   Reason if not Attempted Activity not applicable   Wheel 50 Feet with Two Turns CARE Score 9   QI: Wheel 150 Feet   Reason if not Attempted Activity not applicable   Wheel 037 Feet CARE Score 9   Wheelchair mobility   QI: Does the patient use a wheelchair? 0  No   QI: 1 Step (Curb)   Reason if not Attempted Activity not applicable   1 Step (Curb) CARE Score 9   QI: 4 Steps   Reason if not Attempted Activity not applicable   4 Steps CARE Score 9   QI: 12 Steps   Reason if not Attempted Activity not applicable   12 Steps CARE Score 9   QI: Picking Up Object   Reason if not Attempted Activity not applicable   Picking Up Object CARE Score 9   QI: Toilet Transfer   Reason if not Attempted Activity not applicable   Toilet Transfer CARE Score 9   Therapeutic Interventions   Strengthening Supine ther ex on BLE's for heel slides, hip abduction, SLR, SAQ, quad sets, gluteal sets, ankle DF/PF for 3 sets of 10 reps each  Seated LAQ and ankle DF/PF for 3 sets of 10 reps each  Assessment   Treatment Assessment Patient was very pleasanta nd very cooperative the entire therapy session   He requested to have his therapy session in his room today because he wants to watch football  He needed to rest in between exercises due to fatigue  He ambulated farther today for 151 feet x 2 using RW while therapist pulls w/c along for safety  He performed seated and supine exercises  He will benefit from skilled PT services to improve BLE's strength to facilitate safety and [place] with transfers and gait, to improve endurance with gait and improve standing balance to reduce risk for falls  Family/Caregiver Present no   Problem List Decreased strength;Decreased endurance; Impaired balance;Decreased mobility; Decreased safety awareness;Orthopedic restrictions   Barriers to Discharge Decreased caregiver support   PT Barriers   Physical Impairment Decreased strength;Decreased endurance; Impaired balance;Decreased mobility; Decreased safety awareness;Orthopedic restrictions   Functional Limitation Stair negotiation;Transfers; Walking;Standing;Car transfers   Plan   Treatment/Interventions Functional transfer training;LE strengthening/ROM; Elevations; Therapeutic exercise; Endurance training;Bed mobility;Gait training   Progress Progressing toward goals   Recommendation   Recommendation Home with family support;Home PT   Equipment Recommended Walker   PT Therapy Minutes   PT Time In 1230   PT Time Out 1330   PT Total Time (minutes) 60   PT Mode of treatment - Individual (minutes) 60   PT Mode of treatment - Concurrent (minutes) 0   PT Mode of treatment - Group (minutes) 0   PT Mode of treatment - Co-treat (minutes) 0   PT Mode of Teatment - Total time(minutes) 60 minutes   Therapy Time missed   Time missed?  No

## 2017-12-10 NOTE — PROGRESS NOTES
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/multiple areas intracranial atherosclerosis/severe stenosis inferior Rt MCA:  Continue ASA  Resume Plavix once pt completes a 6 month course of anticoagulation for Lt UE DVT  No statin as pt had hives in the past  Getting an API on 12/13/17  2   Rt hip revision at [facility] [place] [facility] on 11/20/17:  Pain control per primary service  Follow-up with [facility]  3   Rt hip hematoma: occurred after getting tPA  Hemoglobin stable    4  Acute blood loss anemia;  s/p multiple transfusions:  Stable  5   Lt UE DVT:  on therapeutic Lovenox until INR > 2 = stopped 12/9/17 since INR was 2 0  Was on Coumadin 5mg qd and increased to 7 5 mg qd on 12/7/17 x 2 days then yesterday down to 6mg qd = no change today  Thrombosis Panel: + lupus anticoagulant, mildly decreased antithrombin III, PTT-LA mixing study prolonged at 53 sec, and hexagonal phase phospholipid elevated at 16  Pt will need Hematology follow-up and recheck lupus anticoagulant in 12 weeks    6  CAD with CONCEPCION:  Continue full ASA  For API 12/13/17    7  HTN: was not always optimally controlled on Amlodipine 10mg daily, Cardura 4mg daily, Lisinopril 40mg daily and Lopressor 50mg every 12 hours so on 12/7/17  increased Lopressor to 75mg q12hrs  No changes today  8   CKD stage III; baseline Cr 1 4-1 5: stable; Will monitor  9   DM type 2:  HbA1C in April 7 3  Pt takes Glyburide 5mg 2 tabs in the AM and 1 tab in the PM   Pt has been on insulin coverage and 10U Lantus at bedtime  Added back glyburide 5mg qAM 12/8 and stopped the Lantus  , 149, 142, 210; fasting today 104/lunch 151    10  Vit D deficiency:  continue 50,000U weekly x 6 and then 1000U/day      Subjective: Patient seen and examined   No new or overnight issues     ROS:   GI: denies abdominal pain, change bowel habits or reflux symptoms  Neuro: No new neurologic changes  Respiratory: No Cough, SOB  Cardiovascular: No CP, palpitations     Scheduled Meds:    acetaminophen 975 mg Oral Q8H [facility]   amLODIPine 10 mg Oral Daily   aspirin 324 mg Oral Daily   ergocalciferol 50,000 Units Oral Weekly   Followed by      Felicity Kerr ON 1/17/2018] cholecalciferol 1,000 Units Oral Daily   doxazosin 4 mg Oral Daily   folic acid-pyridoxine-cyanocobalamin 1 tablet Oral Daily   glyBURIDE 5 mg Oral Daily With Breakfast   insulin lispro 1-5 Units Subcutaneous TID AC   insulin lispro 1-5 Units Subcutaneous HS   lisinopril 40 mg Oral Daily   metoprolol tartrate 75 mg Oral Q12H [facility]   polyethylene glycol 17 g Oral Daily   warfarin 6 mg Oral Daily (warfarin)       Labs:       Results from last 7 days  Lab Units 12/08/17  0554 12/06/17  0523  12/05/17  0543   WBC Thousand/uL 6 01  --   --  8 00   HEMOGLOBIN g/dL 8 2* 8 3*  < > 8 2*   HEMATOCRIT % 25 0* 25 6*  < > 24 9*   PLATELETS Thousands/uL 387  --   --  452*   < > = values in this interval not displayed      Results from last 7 days  Lab Units 12/08/17  0554 12/05/17  0543   SODIUM mmol/L 134* 136   POTASSIUM mmol/L 4 5 4 3   CHLORIDE mmol/L 102 105   CO2 mmol/L 26 25   BUN mg/dL 27* 22   CREATININE mg/dL 1 38* 1 35*   GLUCOSE RANDOM mg/dL 130 129   CALCIUM mg/dL 8 6 8 8           Results from last 7 days  Lab Units 12/10/17  0539 12/09/17  0558   INR  2 50* 2 01*          Glucose (mg/dL)   Date Value   12/08/2017 130   12/05/2017 129   12/01/2017 127   11/30/2017 258 (H)   12/01/2015 141 (H)   07/14/2015 124 (H)   09/11/2014 116 (H)   01/09/2014 134 (H)     Glucose, i-STAT (mg/dl)   Date Value   11/24/2017 171 (H)       Labs reviewed    Physical Examination:  Vitals:   Vitals:    12/09/17 2026 12/10/17 0500 12/10/17 0831 12/10/17 1404   BP: 158/74 166/84 147/75 137/67   Pulse:  69 75 67   Resp:  20  21   Temp:  99 °F (37 2 °C)  99 1 °F (37 3 °C)   TempSrc:  Oral  Oral   SpO2:  96%  97%   Weight:       Height:           GEN: NAD  HEENT: NC/AT  RESP: BBS w/o crackles/wheeze/rhonci; resp unlabored  CV: +S1 S2, regular rate, no rubs/murmurs  ABD: soft, NT, ND, normal BS   : no villarreal  EXT: mild RLE edema = continue TEDS  Skin: no rashes  Neuro: AAO, limited movement of Right leg 2/2 pain/surgery otherwise rest of extremities 5/5      [x ] Total time spent: 30 Mins and greater than 50% of this time was spent counseling/coordinating care        Laurent Eid, [address] [facility]  Internal Medicine

## 2017-12-11 LAB
ANION GAP SERPL CALCULATED.3IONS-SCNC: 6 MMOL/L (ref 4–13)
BASOPHILS # BLD AUTO: 0.03 THOUSANDS/ΜL (ref 0–0.1)
BASOPHILS NFR BLD AUTO: 1 % (ref 0–1)
BUN SERPL-MCNC: 29 MG/DL (ref 5–25)
CALCIUM SERPL-MCNC: 8.5 MG/DL (ref 8.3–10.1)
CHLORIDE SERPL-SCNC: 106 MMOL/L (ref 100–108)
CO2 SERPL-SCNC: 25 MMOL/L (ref 21–32)
CREAT SERPL-MCNC: 1.24 MG/DL (ref 0.6–1.3)
EOSINOPHIL # BLD AUTO: 0.26 THOUSAND/ΜL (ref 0–0.61)
EOSINOPHIL NFR BLD AUTO: 5 % (ref 0–6)
ERYTHROCYTE [DISTWIDTH] IN BLOOD BY AUTOMATED COUNT: 17.4 % (ref 11.6–15.1)
GFR SERPL CREATININE-BSD FRML MDRD: 55 ML/MIN/1.73SQ M
GLUCOSE SERPL-MCNC: 101 MG/DL (ref 65–140)
GLUCOSE SERPL-MCNC: 148 MG/DL (ref 65–140)
GLUCOSE SERPL-MCNC: 182 MG/DL (ref 65–140)
GLUCOSE SERPL-MCNC: 223 MG/DL (ref 65–140)
GLUCOSE SERPL-MCNC: 91 MG/DL (ref 65–140)
HCT VFR BLD AUTO: 25.2 % (ref 36.5–49.3)
HGB BLD-MCNC: 8.2 G/DL (ref 12–17)
INR PPP: 2.53 (ref 0.86–1.16)
LYMPHOCYTES # BLD AUTO: 1.13 THOUSANDS/ΜL (ref 0.6–4.47)
LYMPHOCYTES NFR BLD AUTO: 23 % (ref 14–44)
MCH RBC QN AUTO: 30.8 PG (ref 26.8–34.3)
MCHC RBC AUTO-ENTMCNC: 32.5 G/DL (ref 31.4–37.4)
MCV RBC AUTO: 95 FL (ref 82–98)
MONOCYTES # BLD AUTO: 0.44 THOUSAND/ΜL (ref 0.17–1.22)
MONOCYTES NFR BLD AUTO: 9 % (ref 4–12)
NEUTROPHILS # BLD AUTO: 2.96 THOUSANDS/ΜL (ref 1.85–7.62)
NEUTS SEG NFR BLD AUTO: 62 % (ref 43–75)
NRBC BLD AUTO-RTO: 0 /100 WBCS
PLATELET # BLD AUTO: 348 THOUSANDS/UL (ref 149–390)
PMV BLD AUTO: 8.6 FL (ref 8.9–12.7)
POTASSIUM SERPL-SCNC: 4.6 MMOL/L (ref 3.5–5.3)
PROTHROMBIN TIME: 27.6 SECONDS (ref 12.1–14.4)
RBC # BLD AUTO: 2.66 MILLION/UL (ref 3.88–5.62)
SODIUM SERPL-SCNC: 137 MMOL/L (ref 136–145)
WBC # BLD AUTO: 4.84 THOUSAND/UL (ref 4.31–10.16)

## 2017-12-11 PROCEDURE — 80048 BASIC METABOLIC PNL TOTAL CA: CPT | Performed by: NURSE PRACTITIONER

## 2017-12-11 PROCEDURE — 82948 REAGENT STRIP/BLOOD GLUCOSE: CPT

## 2017-12-11 PROCEDURE — 97530 THERAPEUTIC ACTIVITIES: CPT | Performed by: OCCUPATIONAL THERAPY ASSISTANT

## 2017-12-11 PROCEDURE — 97110 THERAPEUTIC EXERCISES: CPT

## 2017-12-11 PROCEDURE — 97532 HB COGNITIVE SKILLS DEVELOPMENT: CPT | Performed by: OCCUPATIONAL THERAPY ASSISTANT

## 2017-12-11 PROCEDURE — 97535 SELF CARE MNGMENT TRAINING: CPT | Performed by: OCCUPATIONAL THERAPY ASSISTANT

## 2017-12-11 PROCEDURE — 97116 GAIT TRAINING THERAPY: CPT

## 2017-12-11 PROCEDURE — 85025 COMPLETE CBC W/AUTO DIFF WBC: CPT | Performed by: NURSE PRACTITIONER

## 2017-12-11 PROCEDURE — 85610 PROTHROMBIN TIME: CPT | Performed by: NURSE PRACTITIONER

## 2017-12-11 RX ADMIN — ASPIRIN 81 MG 324 MG: 81 TABLET ORAL at 08:04

## 2017-12-11 RX ADMIN — FOLIC ACID-PYRIDOXINE-CYANOCOBALAMIN TAB 2.5-25-2 MG 1 TABLET: 2.5-25-2 TAB at 08:05

## 2017-12-11 RX ADMIN — AMLODIPINE BESYLATE 10 MG: 10 TABLET ORAL at 08:06

## 2017-12-11 RX ADMIN — INSULIN LISPRO 1 UNITS: 100 INJECTION, SOLUTION INTRAVENOUS; SUBCUTANEOUS at 21:54

## 2017-12-11 RX ADMIN — ACETAMINOPHEN 975 MG: 325 TABLET, FILM COATED ORAL at 14:07

## 2017-12-11 RX ADMIN — ACETAMINOPHEN 975 MG: 325 TABLET, FILM COATED ORAL at 05:59

## 2017-12-11 RX ADMIN — ACETAMINOPHEN 975 MG: 325 TABLET, FILM COATED ORAL at 21:53

## 2017-12-11 RX ADMIN — INSULIN LISPRO 1 UNITS: 100 INJECTION, SOLUTION INTRAVENOUS; SUBCUTANEOUS at 16:03

## 2017-12-11 RX ADMIN — WARFARIN SODIUM 6 MG: 6 TABLET ORAL at 18:14

## 2017-12-11 RX ADMIN — METOPROLOL TARTRATE 75 MG: 50 TABLET ORAL at 08:06

## 2017-12-11 RX ADMIN — LISINOPRIL 40 MG: 20 TABLET ORAL at 08:06

## 2017-12-11 RX ADMIN — GLYBURIDE 5 MG: 5 TABLET ORAL at 08:05

## 2017-12-11 RX ADMIN — METOPROLOL TARTRATE 75 MG: 50 TABLET ORAL at 21:52

## 2017-12-11 RX ADMIN — DOXAZOSIN 4 MG: 4 TABLET ORAL at 08:05

## 2017-12-11 NOTE — PROGRESS NOTES
Removed most of staples in R hip incision, 24 remain in patient in edematous areas if incision  Will evaluate in am Patient tolerated well  Ice placed on site, no bleeding or drainage present

## 2017-12-11 NOTE — PROGRESS NOTES
Progress Note - Mikey Claros [de-identified] y o  male MRN: [id number]    Unit/Bed#: [facility] 453-68 Encounter: [id number]            Subjective:   Patient denies any issues over the weekend     Objective:     ROS  Gen: denies recent wt loss   Psych: denies mood change    Vitals:    12/11/17 1339   BP: 129/64   Pulse: 68   Resp: 20   Temp: 98 3 °F (36 8 °C)   SpO2: 97%       Physical Exam:     Gen:        NAD   Neck:   trachea midline  Lungs:  respirations unlabored   Heart:    + S1 and S2   Abdomen:    Soft, non-tender  Psych: mood/affect appropriate  Neurologic: awake, alert, appropriately answering questions     Functional :  Mobility: min   Tx: mod  ADLs: mod        acetaminophen 975 mg Oral Q8H [address]   amLODIPine 10 mg Oral Daily   aspirin 324 mg Oral Daily   ergocalciferol 50,000 Units Oral Weekly   Followed by      Heavenly Chaparro ON 1/17/2018] cholecalciferol 1,000 Units Oral Daily   doxazosin 4 mg Oral Daily   folic acid-pyridoxine-cyanocobalamin 1 tablet Oral Daily   glyBURIDE 5 mg Oral Daily With Breakfast   insulin lispro 1-5 Units Subcutaneous TID AC   insulin lispro 1-5 Units Subcutaneous HS   lisinopril 40 mg Oral Daily   metoprolol tartrate 75 mg Oral Q12H [facility]   polyethylene glycol 17 g Oral Daily   warfarin 6 mg Oral Daily (warfarin)       bisacodyl    magnesium hydroxide    traMADol      Assessment:  Patient is a 79 yo male s/p revision of Rt MAREN by Dr Ron Doss on 11/20/17 p/w stroke like symptoms and was given TPA however developed Rt thigh/gluteal hematoma; course complicated by LUE DVT    Plan:    Rehabilitation: cont PT/OT for ambulatory/ADL dysfxn    Pain: currently on tylenol ATC, intolerant to 5 mg Oxy IR per family (excessive sedation), allergy to dilaudid; trialed tramadol in the past prior to surgery for hip pain without relief, per patient and family at this time they would like to use tylenol only      Rt MAREN revison: performed on 11/20/17 by Dr Ron Doss, d/w Dr Ron Doss and due to hematoma Dr Ron Doss recommended delay staple removal from 2 weeks to 3 weeks post-op, contacted Dr Demond Cueva at 3 wks post-op and agreeable to staple removal; hip precautions & WBAT per ortho and confirmed with Dr Demond RODRIGUEZ DVT: coumadin noted to have mildly decreased AT III level at 88 (LLN 92) and + Lupus anticoagulant (per lab recs this is to be repeated in 12 wks to confirm); FU with Dr Sherlyn Mckee to arrange FU testing and management of AC as OP     Rt thigh/gluteal hematoma: monitor Hg, cleared by surgery to be on coumadin and aspirin 325 mg QD      Likely CVA: per neurology cleared to dc home plavix and instead increase home ASA 81 mg QD to   mg QD (due to non-response at ASA 81 mg QD per API) with AC and once AC stopped at Dr Kiran Ashley discretion in 6 months neurology recommends dual AP therapy; API in 1 wk; patient with allergy to statins (myalgia not hives)     CAD s/p stents: per cards cleared to dc home plavix and instead increase home ASA 81 mg QD to  mg QD due to non-response to ASA 81 mg QD per API for stroke prevention (in addition to [facility] and once [facility] stopped at Dr Kiran Ashley discretion neurology recommends dual AP therpy); on BB, patient with allergy to statins (myalgia not hives); FU with Dr Adithya Blackburn     DM: per OP records patient take glyburide 10 mg QAM and 5 mg QPM however given CKD will defer to IM regarding risk/benefits of this agent in patient with stable CKD versus switch to short-acting sulfonylureas; currently on glyburide 5 mg QAM and ISS, per IM     HTN: on home regimen of cardura 4 mg qd (also for BPH), norvasc 10 mg qdaily, lisinopril 40 mg qd, however home toprol XL 25 mg Q12 has been switched to lopressor 75 mg q12     elevated PSA/BPH/incontinence: on home cardura 4 mg qd; was also on oxybutynin 5 mg qd at home which has not yet been restarted; monitoring for incontinence      basal cell carcinoma: FU with Dr Timothy Ramires who may refer patient for Mohs surgery  PAD (B/L renal artery stenosis, & celiac trunk stenosis): follows with Dr Rosina Duff, currently on AP for CAD and possible CVA, allergy to statins (myalgia not hives)    Vitamin D insufficiency: 20 4 (LLN 30) on 12/6/17, started on ergocalciferol 50,000 units Qweekly x 6 doses starting 12/6/17     Hyponatremia: currently WNL   CKD: baseline Cr 1 5-1 7, currently 1 24, FU with Dr Kadi Sparrow  Anemia: ABLA with likely component of AOCD (CKD);  Hg currently stable at 8 2          DVT ppx: coumadin   Dispo: reteam     Incidental lab findings: hyperparathyroidism (noted to be mild elevated above ULN of 72 at 78 9 on 1/18/17 likely 2/2 to CKD, on supplemental Vit D, OP FU with Dr Kadi Sparrow), elevated total/direct bilirubin (sample hemolyzed, OP FU with PCP for repeat testing and/or specialist referral at PCPs discretion), elevated homocysteine level (mildly elevated above ULN of 14 2 at 15 1, started on a folic ZGWG/H0/Q57 tablet in acute care; OP FU w/ Dr Lisandro Cruz for B6/B12/Folate testing at Dr Lunsford Current discretion and OP FU with patient's own cardiologist Dr Shona Myers)      Incidental findings of EKG of 11/26/17 15:17 per Dr Polo Shelley interpretation: PVCs, RBBB/wide QRS, borderline MT interval (200) and prolonged QTc; evaluated and cleared from cards standpoint (seen by Dr Polo Shelley on 11/26 and Dr Susan Murillo on 11/27) in acute care; OP FU with Dr Shona Myers      Incidental findings of echo (11/25): very mild AS, aortic root dilation/ascending aorta dilation; OP FU with Dr Shona Myers     Incidental findings noted on CT A/P (11/30) & CT C/A/P (11/24): L renal lesions (noted to be stable renal cysts that appear to be simple on U/S of 11/28, OP FU with Dr Kadi Sparrow), hiatal hernia (no c/o of GERD/reflux symptoms, OP FU with PCP), sebaceous cyst anterior to the sternum (OP FU with PCP and/or Dr Godfrey De Dios)      Other incidental findings: Rt vertebral artery occlusion, OP FU at [address] (follows with Dr Rosina Duff for PAD)

## 2017-12-11 NOTE — PROGRESS NOTES
Occupational Therapy Treatment Note:     12/11/17 1230   Pain Assessment   Pain Assessment 0-10   Pain Score 7   Pain Location Hip;Leg   Pain Orientation Right   Hospital Pain Intervention(s) Medication (See MAR); Cold applied;Repositioned; Rest   Restrictions/Precautions   Precautions Bed/chair alarms;Cognitive; Fall Risk;Pain   RLE Weight Bearing Per Order WBAT   ROM Restrictions (THPs  )   QI: Putting On/Taking Off Footwear   Assistance Needed Adaptive equipment;Set-up / clean-up;Supervision;Verbal cues   Assistance Provided by [place] No physical assistance   Comment Initial VCs warranted to perform while seated for optimal safety  When provided with increased time and VCs for successful technique pt able to doff socks using reacher, don using sock aide, and don velcro strapped shoes using reacher and [place] [address] with CS  Putting On/Taking Off Footwear CARE Score 4   Dressing/Undressing Clothing   Remove LB Clothes Socks   Don LB [place]; Shoes   Limitations Noted In Balance; Endurance;Problem Solving; Safety;Strength;ROM   Adaptive Equipment Reacher;LH Shoehorn;Sock Aide   Positioning Supported Sit   Findings When provided with increased time and VCs for successful technique pt able to doff socks using reacher, don using sock aide, and don velcro strapped shoes using reacher and [place] [address] with CS      QI: Sit to Stand   Assistance Needed Adaptive equipment;Set-up / clean-up;Supervision;Verbal cues   Assistance Provided by [place] No physical assistance   Comment RW; VCs to have RW available for support prior to standing in order to ensure good safety; noted impulsive behavior with transfers (i e attempting to stand with leg rests on WC, brakes uncloked, RW not available)   Sit to Stand CARE Score 4   QI: Chair/Bed-to-Chair Transfer   Assistance Needed Adaptive equipment;Set-up / clean-up;Supervision;Verbal cues   Assistance Provided by [place] No physical assistance   Comment VCs to use RW effectively in order to ensure good safety (i e backing up completely to surface transferring to)   Chair/Bed-to-Chair Transfer CARE Score 4   Transfer Bed/Chair/Wheelchair   Positioning Concerns Cognition   Limitations Noted In Balance; Endurance;Problem Solving;LE Strength   Adaptive Equipment Roller Walker   Stand Pivot Supervision   Sit to Stand Supervision   Stand to W  OFELIA Hesterey, Chair, Wheelchair Transfer (FIM) 5 - Patient requires supervision/monitoring   QI: [address] Needed Adaptive equipment;Set-up / [place]; Incidental touching   Assistance Provided by [place] Less than 25%   Comment CS while pt standing unilaterally supported at RW while urinating and briefly unsupported for clothing mgmt over hips with no noted LOB  Encouraged pt to sit on toilet for optimal safety, however pt verbalized reluctance and preference to stand when urinating only  Toileting Hygiene CARE Score 3   Toileting   Able to [address] down yes, up yes  Able to Manage Clothing Closures Yes   Manage Hygiene Bladder   Limitations Noted In Balance;Problem Solving; Safety;LE Strength   Adaptive Equipment Other  (RW)   Toileting (FIM) 5 - Patient requires supervision/monitoring  (CS)   Cognition   Overall Cognitive Status Impaired   Arousal/Participation Alert; Cooperative   Attention Attends with cues to redirect   Orientation Level Oriented X4   Memory Decreased short term memory;Decreased recall of precautions   Following Commands Follows one step commands with increased time or repetition   Comments Administered ACLs cognitive assessment in which pt scored a 4 4/6 0; the following are suggestions for the pt and caregiver and are meant to facilitate the least restrictive pt environment; approx 34% cognitive assistance; pt may live with someone who does a daily check on the environment to remove safety hazards or solve problems; pt may be alone for periods of the day with pre-established plan for seeking assistance by phone or from a neighbor; pt will initiate self set-up and complete adl routine; will use adaptive equipment; will learn schedule and follow daily routine; will be oriented to day/date; may resist change in diet or dietary restrictions and compliance should be monitored; hazards are not anticipated; assist with clothing selection, limit choices or provide ready to wear combinations; limited carryover with use of Minetta Gaucher is expected; memorization is slow, will need long term repetitive training for new learning; may not note changes in terrain or object on the ground and may trip; will require cues to recall safety precautions; restrict access to a motor vehicle; assist with medication and finance mgmt  Pt participated in activity of identifying safe vs  unsafe situations via picture cards with 23/24 correct, and arranging 3-4 step ADL/IADL picture cards with 6/10 correct  Activity Tolerance   Activity Tolerance Patient tolerated treatment well   Assessment   Treatment Assessment OT tx sessions focused on transfers, standing balance, LB dressing task of sock and shoe mgmt, toileting, safety awareness, overall problem solving/processing skills, and formal cognitive assessment  Refer above for details on pt performance  Pt would benefit from continued skilled OT services in order to achieve highest functional abilities  Prognosis Good   Problem List Decreased strength;Decreased range of motion;Decreased endurance; Impaired balance;Decreased mobility; Decreased cognition; Impaired judgement;Decreased safety awareness;Orthopedic restrictions;Pain   Barriers to Discharge Decreased caregiver support   Plan   Treatment/Interventions ADL retraining;Functional transfer training;LE strengthening/ROM; Endurance training;Cognitive reorientation;Patient/family training;Equipment eval/education; Compensatory technique education;OT   Progress Progressing toward goals   Recommendation   OT Discharge Recommendation Home with family support   OT Therapy Minutes   OT Time In 1230   OT Time Out 1400   OT Total Time (minutes) 90   OT Mode of treatment - Individual (minutes) 90   OT Mode of treatment - Concurrent (minutes) 0   OT Mode of treatment - Group (minutes) 0   OT Mode of treatment - Co-treat (minutes) 0   OT Mode of Teatment - Total time(minutes) 90 minutes   Therapy Time missed   Time missed?  No   Bernarda Singh, [address]

## 2017-12-11 NOTE — PROGRESS NOTES
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/multiple areas intracranial atherosclerosis/severe stenosis inferior Rt MCA:  Continue ASA  Resume Plavix once pt completes a 6 month course of anticoagulation for Lt UE DVT  No statin as pt had hives in the past  Getting an API on 12/13/17  2   Rt hip [facility] at [facility] Rere Doyle on 11/20/17:  Pain control per primary service  Follow-up with [facility]  3   Rt hip hematoma: occurred after getting tPA  Hemoglobin stable    4  Acute blood loss anemia;  s/p multiple transfusions:  Stable  5   Lt UE DVT:  on therapeutic Lovenox until INR > 2 = stopped 12/9/17 since INR was 2 0  Was on Coumadin 5mg qd and increased to 7 5 mg qd on 12/7/17 x 2 days then on 12/9/17 down to 6mg qd = no change again today  Thrombosis Panel: + lupus anticoagulant, mildly decreased antithrombin III, PTT-LA mixing study prolonged at 53 sec, and hexagonal phase phospholipid elevated at 16  Pt will need Hematology follow-up and recheck lupus anticoagulant in 12 weeks    6  CAD with CONCEPCION:  Continue full ASA  For API 12/13/17    7  HTN: was not always optimally controlled on Amlodipine 10mg daily, Cardura 4mg daily, Lisinopril 40mg daily and Lopressor 50mg every 12 hours so on 12/7/17  increased Lopressor to 75mg q12hrs  No changes today  8   CKD stage III; baseline Cr 1 4-1 5: stable and actually below baseline currently  Will monitor  9   DM type 2:  HbA1C in April 7 3  Pt takes Glyburide 5mg 2 tabs in the AM and 1 tab in the PM  Added back glyburide 5mg qAM 12/8 and stopped the Lantus 10 U qhs = no change today  , 151, 167, 145; fasting today 101/lunch 148    10  Vit D deficiency:  continue 50,000U weekly x 6 and then 1000U/day      Subjective: Patient seen and examined   Felt dizzy today walking with PT and arms/shoulders felt cold = /69, HR 55, sat 99; has subsided now  This AM he says is the longest he walked      ROS:   GI: denies abdominal pain, change bowel habits or reflux symptoms  Neuro: No new neurologic changes  Respiratory: No Cough, SOB  Cardiovascular: No CP, palpitations     Scheduled Meds:    acetaminophen 975 mg Oral Q8H [address]   amLODIPine 10 mg Oral Daily   aspirin 324 mg Oral Daily   ergocalciferol 50,000 Units Oral Weekly   Followed by      Grace Black ON 1/17/2018] cholecalciferol 1,000 Units Oral Daily   doxazosin 4 mg Oral Daily   folic acid-pyridoxine-cyanocobalamin 1 tablet Oral Daily   glyBURIDE 5 mg Oral Daily With Breakfast   insulin lispro 1-5 Units Subcutaneous TID AC   insulin lispro 1-5 Units Subcutaneous HS   lisinopril 40 mg Oral Daily   metoprolol tartrate 75 mg Oral Q12H [address]   polyethylene glycol 17 g Oral Daily   warfarin 6 mg Oral Daily (warfarin)       Labs:       Results from last 7 days  Lab Units 12/11/17  0548 12/08/17  0554   WBC Thousand/uL 4 84 6 01   HEMOGLOBIN g/dL 8 2* 8 2*   HEMATOCRIT % 25 2* 25 0*   PLATELETS Thousands/uL 348 387       Results from last 7 days  Lab Units 12/11/17  0548 12/08/17  0554   SODIUM mmol/L 137 134*   POTASSIUM mmol/L 4 6 4 5   CHLORIDE mmol/L 106 102   CO2 mmol/L 25 26   BUN mg/dL 29* 27*   CREATININE mg/dL 1 24 1 38*   GLUCOSE RANDOM mg/dL 91 130   CALCIUM mg/dL 8 5 8 6           Results from last 7 days  Lab Units 12/11/17  0548 12/10/17  0539   INR  2 53* 2 50*          Glucose (mg/dL)   Date Value   12/11/2017 91   12/08/2017 130   12/05/2017 129   12/01/2017 127   12/01/2015 141 (H)   07/14/2015 124 (H)   09/11/2014 116 (H)   01/09/2014 134 (H)     Glucose, i-STAT (mg/dl)   Date Value   11/24/2017 171 (H)       Labs reviewed    Physical Examination:  Vitals:   Vitals:    12/10/17 2044 12/11/17 0547 12/11/17 0753 12/11/17 0958   BP: 141/67 166/74 134/64 153/69   Pulse: 69 65 65 55   Resp: 20 20 18    Temp: 98 6 °F (37 °C) 98 5 °F (36 9 °C)     TempSrc: Oral Oral     SpO2: 95% 96% 96% 99%   Weight:       Height:           GEN: NAD  HEENT: NC/AT  RESP: BBS w/o crackles/wheeze/rhonci; resp unlabored  CV: +S1 S2, regular rate, no rubs/murmurs  ABD: soft, NT, ND, normal BS   : no villarreal  EXT: 1+ RLE edema = continue TEDS  Skin: no rashes  Neuro: AAO, limited movement of Right leg 2/2 pain/surgery otherwise rest of extremities 5/5      [x ] Total time spent: 30 Mins and greater than 50% of this time was spent counseling/coordinating care        Ag Greenwood, [facility]  Internal Medicine

## 2017-12-12 LAB
GLUCOSE SERPL-MCNC: 109 MG/DL (ref 65–140)
GLUCOSE SERPL-MCNC: 127 MG/DL (ref 65–140)
GLUCOSE SERPL-MCNC: 130 MG/DL (ref 65–140)
GLUCOSE SERPL-MCNC: 207 MG/DL (ref 65–140)
INR PPP: 2.65 (ref 0.86–1.16)
PROTHROMBIN TIME: 28.6 SECONDS (ref 12.1–14.4)

## 2017-12-12 PROCEDURE — 97116 GAIT TRAINING THERAPY: CPT

## 2017-12-12 PROCEDURE — 82948 REAGENT STRIP/BLOOD GLUCOSE: CPT

## 2017-12-12 PROCEDURE — 97112 NEUROMUSCULAR REEDUCATION: CPT

## 2017-12-12 PROCEDURE — 97110 THERAPEUTIC EXERCISES: CPT

## 2017-12-12 PROCEDURE — 85610 PROTHROMBIN TIME: CPT | Performed by: NURSE PRACTITIONER

## 2017-12-12 PROCEDURE — 97535 SELF CARE MNGMENT TRAINING: CPT | Performed by: OCCUPATIONAL THERAPY ASSISTANT

## 2017-12-12 PROCEDURE — 97530 THERAPEUTIC ACTIVITIES: CPT

## 2017-12-12 RX ADMIN — ACETAMINOPHEN 975 MG: 325 TABLET, FILM COATED ORAL at 21:14

## 2017-12-12 RX ADMIN — AMLODIPINE BESYLATE 10 MG: 10 TABLET ORAL at 08:31

## 2017-12-12 RX ADMIN — ACETAMINOPHEN 975 MG: 325 TABLET, FILM COATED ORAL at 06:00

## 2017-12-12 RX ADMIN — GLYBURIDE 5 MG: 5 TABLET ORAL at 08:36

## 2017-12-12 RX ADMIN — FOLIC ACID-PYRIDOXINE-CYANOCOBALAMIN TAB 2.5-25-2 MG 1 TABLET: 2.5-25-2 TAB at 08:33

## 2017-12-12 RX ADMIN — LISINOPRIL 40 MG: 20 TABLET ORAL at 08:32

## 2017-12-12 RX ADMIN — DOXAZOSIN 4 MG: 4 TABLET ORAL at 08:33

## 2017-12-12 RX ADMIN — ACETAMINOPHEN 975 MG: 325 TABLET, FILM COATED ORAL at 14:06

## 2017-12-12 RX ADMIN — METOPROLOL TARTRATE 75 MG: 50 TABLET ORAL at 21:01

## 2017-12-12 RX ADMIN — WARFARIN SODIUM 6 MG: 6 TABLET ORAL at 17:29

## 2017-12-12 RX ADMIN — INSULIN LISPRO 1 UNITS: 100 INJECTION, SOLUTION INTRAVENOUS; SUBCUTANEOUS at 11:52

## 2017-12-12 RX ADMIN — ASPIRIN 81 MG 324 MG: 81 TABLET ORAL at 08:30

## 2017-12-12 RX ADMIN — METOPROLOL TARTRATE 75 MG: 50 TABLET ORAL at 08:31

## 2017-12-12 NOTE — PCC PHYSICAL THERAPY
12/12/2017  Pt is showing improvement in functional mobility and activities  Pt has decreased tolerance for activity due to pain and limited ROM in RLE  Barriers include stairs, caregiver support, and the pt has decreased awareness of hip precautions  Xfers are at a S level with RW and occasional VC for sequencing when pt is in a rush to use the bathroom  Pt will benefit from skilled PT to increase activity tolerance, increase functional mobility, decrease pain and increase ROM in RLE   Continue POC as per PT

## 2017-12-12 NOTE — PROGRESS NOTES
Patient sleeping with no signs of discomfort  Bed alarm is on and call bell within reach   Will continue to monitor

## 2017-12-12 NOTE — PROGRESS NOTES
12/12/17 1030   Pain Assessment   Pain Assessment 0-10   Pain Score 8   Pain Type Acute pain   Pain Location Leg   Pain Orientation Right   Pain Descriptors Dull   Pain Frequency Constant/continuous   Clinical Progression Not changed   Hospital Pain Intervention(s) Medication (See MAR)  (pt reported he received medication)   Restrictions/Precautions   Precautions Bed/chair alarms; Fall Risk;THR   RLE Weight Bearing Per Order WBAT   ROM Restrictions Yes   RLE ROM Restriction (THR)   Cognition   Overall Cognitive Status Impaired   Arousal/Participation Alert; Cooperative   Attention Attends with cues to redirect   Orientation Level Oriented X4   Memory Within functional limits   Following Commands Follows all commands and directions without difficulty   Subjective   Subjective pt reports R leg pain and limited mobility   QI: Sit to Stand   Assistance Needed Supervision; Adaptive equipment   Sit to Stand CARE Score 4   QI: Chair/Bed-to-Chair Transfer   Assistance Needed Supervision; Adaptive equipment   Chair/Bed-to-Chair Transfer CARE Score 4   Transfer Bed/Chair/Wheelchair   Limitations Noted In Balance;Pain Management;UE Strength;LE Strength   Stand Pivot Supervision   Sit to Stand Supervision   Stand to Sit Supervision   All Transfer Supervision   Findings S   Bed, Chair, Wheelchair Transfer (FIM) 5 - Patient requires supervision/monitoring   QI: Walk 10 Feet   Assistance Needed Supervision; Adaptive equipment   Comment RW   Walk 10 Feet CARE Score 4   Ambulation   Does the patient walk? 2  Yes   Primary Discharge Mode of Locomotion Walk   Walk Assist Level Close Supervision   Gait Pattern Inconsistant Ally; Slow Ally; Forward Flexion; Step to;Decreased R stance; Improper weight shift   Assist Device Suze Harrington Walked (feet) 30 ft   Limitations Noted In Balance; Endurance; Heel Strike;Speed;Strength;Swing   Findings decreased stance on RLE   Walking (FIM) 1 - Patient ambulates less than 49 feet regardless of assist/device/set up   QI: Toilet Transfer   Assistance Needed Supervision; Adaptive equipment   Toilet Transfer CARE Score 4   Toilet Transfer   Surface Assessed Standard Toilet   Limitations Noted In Balance;UE Strength;LE Strength   Adaptive Equipment Grab Bar   Findings stood to urinate   Toilet Transfer (FIM) 5 - Patient requires supervision/monitoring   Therapeutic Interventions   Strengthening seated LAQ 3 # CW, hamstring curls green TB, abduction manual resistance, adduction yellow ball 10x2 each   Neuromuscular Re-Education xfers, bathroom use   Equipment Use   NuStep lvl 0 for 10 mins   Other Comments   Comments pt limited by decreased endurance and limited WB on RLE   Assessment   Treatment Assessment pt has difficulty amb with pain and decreased stance on RLE; pt has sudden urge to urinate which decreases his safety awareness because he tries to rush without proper sequencing; pt requires frequent breaks due to decreased activity tolerance and wants ice when he is resting after therapy for his R leg; pt has decreased balance when xfering from chair>chair; continue to increase activity tolerance for increased functional mobility and activity; continue POC as per PT   Problem List Decreased strength;Decreased range of motion;Decreased endurance; Impaired balance;Decreased mobility; Decreased cognition; Impaired judgement;Decreased safety awareness   Barriers to Discharge Decreased caregiver support   PT Barriers   Physical Impairment Decreased strength;Decreased endurance; Impaired balance;Decreased mobility; Decreased safety awareness;Orthopedic restrictions   Functional Limitation Stair negotiation;Transfers; Walking;Standing;Car transfers   Plan   Treatment/Interventions ADL retraining;Functional transfer training;LE strengthening/ROM; Elevations; Therapeutic exercise; Endurance training;Patient/family training;Cognitive reorientation;Equipment eval/education; Bed mobility;Gait training   Progress Progressing toward goals   Recommendation   Recommendation Home PT;Outpatient PT;24 hour supervision/assist   Equipment Recommended Walker   PT Therapy Minutes   PT Time In 1030   PT Time Out 1130   PT Total Time (minutes) 60   PT Mode of treatment - Individual (minutes) 60   PT Mode of treatment - Concurrent (minutes) 0   PT Mode of treatment - Group (minutes) 0   PT Mode of treatment - Co-treat (minutes) 0   PT Mode of Teatment - Total time(minutes) 60 minutes   Therapy Time missed   Time missed?  No

## 2017-12-12 NOTE — PROGRESS NOTES
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 Deond[facility] on 11/20/17:  Pain control per primary service  Follow-up with [facility]  3   Rt hip hematoma: occurred after getting tPA  Hemoglobin stable    4  Acute blood loss anemia;  s/p multiple transfusions:  Stable  5   Lt UE DVT:  on therapeutic Lovenox until INR > 2 = stopped 12/9/17 since INR was 2 0  Was on Coumadin 5mg qd and increased to 7 5 mg qd on 12/7/17 x 2 days then on 12/9/17 down to 6mg qd = no change again today  Thrombosis Panel: + lupus anticoagulant, mildly decreased antithrombin III, PTT-LA mixing study prolonged at 53 sec, and hexagonal phase phospholipid elevated at 16  Pt will need Hematology follow-up and recheck lupus anticoagulant in 12 weeks    6  CAD with CONCEPCION:  Continue full ASA  For API 12/13/17    7  HTN: was not always optimally controlled on Amlodipine 10mg daily, Cardura 4mg daily, Lisinopril 40mg daily and Lopressor 50mg every 12 hours so on 12/7/17  increased Lopressor to 75mg q12hrs  No changes today  8   CKD stage III; baseline Cr 1 4-1 5: stable and actually below baseline currently  Will monitor  9   DM type 2:  HbA1C in April 7 3  Pt takes Glyburide 5mg 2 tabs in the AM and 1 tab in the PM  Added back glyburide 5mg qAM 12/8 and stopped the Lantus 10 U qhs = no change today  , 148, 223, 182; fasting today 127/lunch 207    10    Vit D deficiency:  continue 50,000U weekly x 6 and then 1000U/day      Subjective: Patient seen and examined  [place] dizzy today walking with PT and arms/shoulders felt cold = /69, HR 55, sat 99; has subsided now  This AM he says is the longest he walked      ROS:   GI: denies abdominal pain, change bowel habits or reflux symptoms  Neuro: No new neurologic changes  Respiratory: No Cough, SOB  Cardiovascular: No CP, palpitations     Scheduled Meds:    acetaminophen 975 mg Oral Q8H [facility]   amLODIPine 10 mg Oral Daily   aspirin 324 mg Oral Daily   ergocalciferol 50,000 Units Oral Weekly   Followed by      Reena Benavides ON 1/17/2018] cholecalciferol 1,000 Units Oral Daily   doxazosin 4 mg Oral Daily   folic acid-pyridoxine-cyanocobalamin 1 tablet Oral Daily   glyBURIDE 5 mg Oral Daily With Breakfast   insulin lispro 1-5 Units Subcutaneous TID AC   insulin lispro 1-5 Units Subcutaneous HS   lisinopril 40 mg Oral Daily   metoprolol tartrate 75 mg Oral Q12H [facility]   polyethylene glycol 17 g Oral Daily   warfarin 6 mg Oral Daily (warfarin)       Labs:       Results from last 7 days  Lab Units 12/11/17  0548 12/08/17  0554   WBC Thousand/uL 4 84 6 01   HEMOGLOBIN g/dL 8 2* 8 2*   HEMATOCRIT % 25 2* 25 0*   PLATELETS Thousands/uL 348 387       Results from last 7 days  Lab Units 12/11/17  0548 12/08/17  0554   SODIUM mmol/L 137 134*   POTASSIUM mmol/L 4 6 4 5   CHLORIDE mmol/L 106 102   CO2 mmol/L 25 26   BUN mg/dL 29* 27*   CREATININE mg/dL 1 24 1 38*   GLUCOSE RANDOM mg/dL 91 130   CALCIUM mg/dL 8 5 8 6           Results from last 7 days  Lab Units 12/12/17  0545 12/11/17  0548   INR  2 65* 2 53*          Glucose (mg/dL)   Date Value   12/11/2017 91   12/08/2017 130   12/05/2017 129   12/01/2017 127   12/01/2015 141 (H)   07/14/2015 124 (H)   09/11/2014 116 (H)   01/09/2014 134 (H)     Glucose, i-STAT (mg/dl)   Date Value   11/24/2017 171 (H)       Labs reviewed    Physical Examination:  Vitals:   Vitals:    12/11/17 2008 12/11/17 2152 12/12/17 0500 12/12/17 0830   BP: 156/66 (!) 173/84 165/77 148/67   Pulse: 71 68 64 69 Resp: 20  20 18   Temp: 98 °F (36 7 °C)  98 °F (36 7 °C) 97 5 °F (36 4 °C)   TempSrc: Tympanic  Oral Oral   SpO2: 98%  98% 96%   Weight:       Height:           GEN: NAD  HEENT: NC/AT  RESP: BBS w/o crackles/wheeze/rhonci; resp unlabored  CV: +S1 S2, regular rate, no rubs/murmurs  ABD: soft, NT, ND, normal BS   : no villarreal  EXT: 1+ RLE edema = continue TEDS  Skin: no rashes  Neuro: AAO, limited movement of Right leg 2/2 pain/surgery otherwise rest of extremities 5/5      [x ] Total time spent: 30 Mins and greater than 50% of this time was spent counseling/coordinating care        Kenyon Jeffers, Jessica Morse  Internal Medicine

## 2017-12-12 NOTE — PROGRESS NOTES
Physical Therapy Progress Note     12/11/17 2500   Pain Assessment   Pain Assessment 0-10   Pain Score 7   Pain Type Acute pain   Pain Location Hip   Pain Orientation Right   Restrictions/Precautions   Precautions Bed/chair alarms; Fall Risk   Weight Bearing Restrictions No   RLE Weight Bearing Per Order WBAT   Cognition   Overall Cognitive Status Impaired   Arousal/Participation Alert; Cooperative   Attention Attends with cues to redirect   Orientation Level Oriented X4   Memory Within functional limits   Following Commands Follows all commands and directions without difficulty   Subjective   Subjective reports 7/10 pain in R hip;    Bed Mobility   Able to Roll Left to Right;Right to Left;Scoot Up   Findings S   Transfer Bed/Chair/Wheelchair   Limitations Noted In Balance;LE Strength   Adaptive Equipment Roller Walker   Stand Pivot Supervision   Sit to Stand Supervision   Stand to Sit Supervision   Supine to Sit Supervision   Sit to Supine Supervision   All Transfer Supervision   Bed, Chair, Wheelchair Transfer (FIM) 5 - Patient requires supervision/monitoring   Ambulation   Primary Discharge Mode of Locomotion Walk   Walk Assist Level Contact Guard   Gait Pattern Inconsistant Ally   Assist Device Roller Walker   Distance Walked (feet) 150 ft   Limitations Noted In Balance; Heel Strike   Walking (FIM) 4 - Patient requires steadying assist or light touching AND distance 150 feet or more, no rest   Wheelchair mobility   Wheelchair (FIM) 0 - Activity does not occur   Stairs   Findings NT   Toilet Transfer   Surface Assessed Standard Toilet   Toilet Transfer (FIM) 4 - Patient requires steadying assist or light touching   Therapeutic Interventions   Strengthening supine TE (SLR, SAQ, abd/add, bridges)   Flexibility manual stretch B HS and gastroc;     Assessment   Treatment Assessment Pt cont to require S for xfers and cgA/Tonia for amb 150' with RW; instructed in supine TE (as above) for the duration fo the 30-minute session to improve B LE strength and ROM; recommend cont PT POC; Family/Caregiver Present no   Problem List Decreased strength;Decreased endurance; Impaired balance;Decreased mobility; Decreased safety awareness;Orthopedic restrictions   Barriers to Discharge Decreased caregiver support   PT Barriers   Physical Impairment Decreased strength;Decreased endurance; Impaired balance;Decreased mobility; Decreased safety awareness;Orthopedic restrictions   Functional Limitation Stair negotiation;Transfers; Walking;Standing;Car transfers   Plan   Treatment/Interventions ADL retraining;Functional transfer training;LE strengthening/ROM; Elevations; Therapeutic exercise; Endurance training;Cognitive reorientation;Patient/family training;Equipment eval/education; Bed mobility;Gait training   Progress Progressing toward goals   Recommendation   Recommendation Home PT;Outpatient PT;24 hour supervision/assist   Equipment Recommended Walker   PT Therapy Minutes   PT Time In 1430   PT Time Out 1500   PT Total Time (minutes) 30   PT Mode of treatment - Individual (minutes) 30   PT Mode of treatment - Concurrent (minutes) 0   PT Mode of treatment - Group (minutes) 0   PT Mode of treatment - Co-treat (minutes) 0   PT Mode of Teatment - Total time(minutes) 30 minutes   Therapy Time missed   Time missed?  No     Aliya Almonte, PTA

## 2017-12-12 NOTE — PROGRESS NOTES
12/12/17 0700   Pain Assessment   Pain Assessment No/denies pain   Pain Score No Pain   Restrictions/Precautions   Precautions Aspiration;Bed/chair alarms; Fall Risk;THR   Weight Bearing Restrictions No   RLE Weight Bearing Per Order WBAT   QI: Eating   Assistance Needed Set-up / clean-up   Assistance Provided by [place] No physical assistance   Eating CARE Score 5   Eating Assessment   Food To Mouth Yes   Able To Cut No   Positioning Upright;Out of Bed   Meal Assessed Breakfast   Intake Mode PO;Self   Finishes Timely Yes   Opens Packages Yes   Findings Pt required setup with opening all containers  Eating (FIM) 5 - Patient needs help to open contianers or set up tray   QI: [address] / [address] [facility] Provided by [place] No physical assistance   Oral Hygiene CARE Score 5   Grooming   Able To Initiate Tasks; Acquire Items; Shave;Comb/Brush Hair;Wash/Dry Face;Brush/Clean Teeth;Wash/Dry Hands   Limitation Noted In Problem Solving; Safety;Strength   Findings seated at sink in chair   Grooming (FIM) 5 - [place] sets up supplies or applies device   QI: Shower/Bathe Self   Assistance Needed Incidental touching   Assistance Provided by [place] Less than 25%   Comment reacher    Shower/Bathe Self CARE Score 3   Bathing   Assessed Bath Style Sponge Bath   Anticipated D/C Bath Style Shower   Able to Magalie Sukhdeep No   Able to [employer] Temperature Yes   Able to Wash/Rinse/Dry (body part) Left Arm;Right Arm;L Upper Leg;R Upper Leg;L Lower Leg/Foot;R Lower Leg/Foot;Chest;Abdomen;Perineal Area; Buttocks   Limitations Noted in Balance; Endurance;Problem Solving;ROM;Safety;Strength   Positioning Seated;Standing   Findings  Pt required A with steadying due to decreased balance while completing bottom care  Bathing (FIM) 4 - Patient requires steadying assist or light touching   Tub/Shower Transfer   Not Assessed Balance; Safety;Sponge Bath   Findings Pt reported feeling fatigue and weak during session  Pt completed sponge bath to increase safety seated at sink  QI: Upper Body Dressing   Assistance Needed Set-up / clean-up;Supervision   Assistance Provided by [place] No physical assistance   Comment Verbal cues for sitting to don shirt   Upper Body Dressing CARE Score 4   QI: Lower Body Dressing   Assistance Needed Incidental touching   Assistance Provided by [place] Less than 25%   Comment reacher utilized and CGA for steadying    Lower Body Dressing CARE Score 3   QI: Putting On/Taking Off Footwear   Assistance Needed Adaptive equipment;Supervision   Assistance Provided by [place] No physical assistance   Putting On/Taking Off Footwear CARE Score 4   QI: Picking Up Object   Reason if not Attempted Safety concerns   Picking Up Object CARE Score 88   Dressing/Undressing Clothing   Remove UB Clothes Other  (hospital gown )   Remove  [facility]; Undergarment;TEDs; Shoes   Limitations Noted In Balance; Endurance;Problem Solving; Safety;Strength;ROM   Adaptive Equipment Reacher   Positioning Supported Sit;Standing   Findings Pt required CGA when standing to complete clothing management   UB Dressing (FIM) 5 - Patient requires supervision/monitoring   LB Dressing (FIM) 4 - Patient requires steadying assist or light touching   QI: Roll Left and Right   Assistance Needed Incidental touching   Assistance Provided by [place] Less than 25%   Roll Left and Right CARE Score 3   QI: Lying to Sitting on Side of Bed   Assistance Needed Verbal cues; Incidental touching   Assistance Provided by [place] Less than 25%   Lying to Sitting on Side of Bed CARE Score 3   QI: Sit to Stand   Assistance Needed Adaptive equipment; Incidental touching   Assistance Provided by [place] No physical assistance   Sit to Stand CARE Score 4   Bed Mobility   Rolling R 4  Minimal assistance   Rolling L 4  Minimal assistance   Supine to Sit 4  Minimal assistance   Sit to Supine 4  Minimal assistance   QI: Chair/Bed-to-Chair Transfer   Assistance Needed Adaptive equipment; Incidental touching   Assistance Provided by [place] Less than 25%   Comment CGA using RW   Chair/Bed-to-Chair Transfer CARE Score 3   Transfer Bed/Chair/Wheelchair   Positioning Concerns Cognition   Limitations Noted In Balance; Endurance;Problem Solving;LE Strength   Adaptive Equipment Roller Walker   Findings Pt required hands on A for steadying due to decreased balance  Bed, Chair, Wheelchair Transfer (FIM) 4 - Patient requires steadying assist or light touching   QI: [address] Needed Adaptive equipment; Incidental touching   Assistance Provided by [place] Less than 25%   Toileting Hygiene CARE Score 3   Toileting   Able to [address] down yes, up yes  Able to Manage Clothing Closures Yes   Manage Hygiene Bladder   Limitations Noted In Balance;ROM;Safety;LE Strength   Adaptive Equipment Grab Bar   Findings Pt required A for bottom hygiene due to inability to reach  Toileting (FIM) 5 - [place] sets up supplies or applies device   QI: Toilet Transfer   Assistance Needed Incidental touching   Assistance Provided by [place] Less than 25%   Toilet Transfer CARE Score 3   Toilet Transfer   Surface Assessed Standard Commode   Transfer Technique Standard   Limitations Noted In Balance; Endurance   Toilet Transfer (FIM) 4 - Patient requires steadying assist or light touching   Cognition   Overall Cognitive Status Impaired   Arousal/Participation Alert; Cooperative   Attention Attends with cues to redirect   Orientation Level Oriented X4   Memory Within functional limits   Following Commands Follows all commands and directions without difficulty   Activity Tolerance   Activity Tolerance Patient tolerated treatment well   Assessment   Treatment Assessment Pt session focused on self care skills with setup provided  Pt reported feeling fatigued and weak during session   Pt completed sponge bath seated to increase safety  Pt given CGA during all transfers to increase safety  Pt demonstrated increased safety and balance throughout session once more awake  Pt was setup for breakfast and educated on safety  Pt at times walks away from RW decreasing overall safety  Pt with noted balance deficit and overall safety which will require 24H supervision upon discharge  Please see above for more details on self care skills  Continue with current POC  Prognosis Good   Problem List Decreased strength;Decreased range of motion;Decreased endurance; Impaired balance;Decreased mobility; Decreased cognition; Impaired judgement;Decreased safety awareness   Barriers to Discharge Decreased caregiver support   Plan   Treatment/Interventions ADL retraining;Functional transfer training;LE strengthening/ROM; Therapeutic exercise; Endurance training;Cognitive reorientation; Bed mobility   Progress Progressing toward goals   Recommendation   OT Discharge Recommendation 24 hour supervision/assist   OT Therapy Minutes   OT Time In 0700   OT Time Out 0830   OT Total Time (minutes) 90   OT Mode of treatment - Individual (minutes) 90   OT Mode of treatment - Concurrent (minutes) 0   OT Mode of treatment - Group (minutes) 0   OT Mode of treatment - Co-treat (minutes) 0   OT Mode of Teatment - Total time(minutes) 90 minutes   Therapy Time missed   Time missed?  No

## 2017-12-12 NOTE — PROGRESS NOTES
Physical Therapy Progress Note     12/11/17 6202   Pain Assessment   Pain Assessment No/denies pain   Pain Score No Pain   Restrictions/Precautions   Precautions Bed/chair alarms; Fall Risk   Weight Bearing Restrictions No   RLE Weight Bearing Per Order WBAT   Cognition   Overall Cognitive Status Impaired   Arousal/Participation Alert; Cooperative   Attention Attends with cues to redirect   Orientation Level Oriented X4   Memory Within functional limits   Following Commands Follows all commands and directions without difficulty   Subjective   Subjective denies pain; no c/o   Bed Mobility   Able to Roll Left to Right;Right to Left;Scoot Up   Findings S   Transfer Bed/Chair/Wheelchair   Limitations Noted In Balance;LE Strength   Adaptive Equipment Roller Walker   Stand Pivot Supervision   Sit to Stand Supervision   Stand to Sit Supervision   Supine to Sit Supervision   Sit to Supine Supervision   All Transfer Supervision   Findings cueing to use B LE equally for STS/SPT   Bed, Chair, Wheelchair Transfer (FIM) 4 - Patient completes 75% of all tasks   Ambulation   Primary Discharge Mode of Locomotion Walk   Walk Assist Level Contact Guard   Gait Pattern Inconsistant Ally   Assist Device Roller Walker   Distance Walked (feet) 150 ft   Limitations Noted In Balance; Heel Strike   Walking (FIM) 4 - Patient requires steadying assist or light touching AND distance 150 feet or more, no rest   Wheelchair mobility   Wheelchair (FIM) 0 - Activity does not occur   Stairs   Type Stairs   # of Steps 12   Weight Bearing Precautions Fall Risk   Assist Devices Bilateral Rail   Findings contact guard   Stairs (FIM) 4 - Patient requires steadying assist or light touching AND patient goes up and down full flight (12- 14 stairs)   Toilet Transfer   Surface Assessed Standard Toilet   Toilet Transfer (FIM) 4 - Patient completes 75% of all tasks   Therapeutic Interventions   Strengthening standing TE (hip flex/ext/abd/add, HS curls, mini squats, calf raises) reps until fatigued   Flexibility manual stretch B HS and gastroc;    Equipment Use   NuStep x12 minutes, level 3   Assessment   Treatment Assessment Pt cont to have difficulty with xfers and amb, needs occ assist to rise and cueing to use B LE equally during STS; amb limited by pain and endurance; instructed in standing TE (as above) and NuStep x12 minutes, level 2; finished session seated in bedside chair, alarms active and all needs in reach; recommedn cont PT POC; Family/Caregiver Present no   Problem List Decreased strength;Decreased endurance; Impaired balance;Decreased mobility; Decreased safety awareness;Orthopedic restrictions   Barriers to Discharge Decreased caregiver support   PT Barriers   Physical Impairment Decreased strength;Decreased endurance; Impaired balance;Decreased mobility; Decreased safety awareness;Orthopedic restrictions   Functional Limitation Stair negotiation;Transfers; Walking;Standing;Car transfers   Plan   Treatment/Interventions ADL retraining;Functional transfer training;LE strengthening/ROM; Elevations; Therapeutic exercise; Endurance training;Cognitive reorientation;Patient/family training;Equipment eval/education; Bed mobility;Gait training   Progress Progressing toward goals   Recommendation   Recommendation Home PT;Outpatient PT   Equipment Recommended Walker   PT Therapy Minutes   PT Time In 0930   PT Time Out 1030   PT Total Time (minutes) 60   PT Mode of treatment - Individual (minutes) 60   PT Mode of treatment - Concurrent (minutes) 0   PT Mode of treatment - Group (minutes) 0   PT Mode of treatment - Co-treat (minutes) 0   PT Mode of Teatment - Total time(minutes) 60 minutes   Therapy Time missed   Time missed?  No Stewart Seip, PTA

## 2017-12-12 NOTE — PROGRESS NOTES
12/12/17 1500   Pain Assessment   Pain Assessment 0-10   Pain Score 4   Pain Type Acute pain   Pain Location Leg   Pain Orientation Right   Pain Descriptors Aching   Pain Frequency Constant/continuous   Hospital Pain Intervention(s) Medication (See MAR); Cold applied   Restrictions/Precautions   Precautions Bed/chair alarms; Fall Risk   ROM Restrictions Yes   RLE ROM Restriction (THPs)   Subjective   Subjective pt cont to report R quad pain; pt stated he had ice on in room   QI: Roll Left and Right   Reason if not Attempted Activity not applicable   Roll Left and Right CARE Score 9   QI: Sit to Lying   Reason if not Attempted Activity not applicable   Sit to Lying CARE Score 9   QI: Lying to Sitting on Side of Bed   Reason if not Attempted Activity not applicable   Lying to Sitting on Side of Bed CARE Score 9   QI: Sit to Stand   Assistance Needed Incidental touching   Comment cues to use RLE   Sit to Stand CARE Score 4   QI: Chair/Bed-to-Chair Transfer   Assistance Needed Incidental touching; Adaptive equipment   Chair/Bed-to-Chair Transfer CARE Score 4   Transfer Bed/Chair/Wheelchair   Limitations Noted In Balance;LE Strength   Adaptive Equipment Roller Walker   Stand Pivot Supervision;Contact Guard   Sit to Guard [employer]   Stand to [employer]   Findings does not use RLE for STS; cues to utilize   Bed, Chair, Wheelchair Transfer (FIM) 4 - Patient requires steadying assist or light touching   QI: Car Transfer   Reason if not Attempted Activity not applicable   Car Transfer CARE Score 9   QI: Walk 10 Feet   Assistance Needed Incidental touching; Adaptive equipment   Walk 10 Feet CARE Score 4   QI: Walk 50 Feet with Two Turns   Assistance Needed Incidental touching; Adaptive equipment   Walk 50 Feet with Two Turns CARE Score 4   QI: Walk 150 Feet   Assistance Needed Incidental touching; Adaptive equipment   Walk 150 Feet CARE Score 4   QI: Walking 10 Feet on Uneven Surfaces   Reason if not Attempted Activity not applicable   Walking 10 Feet on Uneven Surfaces CARE Score 9   Ambulation   Does the patient walk? 2  Yes   Primary Discharge Mode of Locomotion Walk   Walk Assist Level Contact Guard   Gait Pattern Inconsistant Ally;Decreased foot clearance; Improper weight shift   Assist Device Suze Harrington Walked (feet) 150 ft  (x2)   Limitations Noted In Balance; Heel Strike;Speed;Strength   Findings decrease step length; raised RW up one notch to decrease fwd flex   Walking (FIM) 4 - Patient requires steadying assist or light touching AND distance 150 feet or more, no rest   QI: Wheel 50 Feet with Two Turns   Reason if not Attempted Activity not applicable   Wheel 50 Feet with Two Turns CARE Score 9   QI: Wheel 150 Feet   Reason if not Attempted Activity not applicable   Wheel 776 Feet CARE Score 9   Wheelchair mobility   QI: Does the patient use a wheelchair? 0  No   QI: 1 Step (Curb)   Reason if not Attempted Activity not applicable   1 Step (Curb) CARE Score 9   QI: 4 Steps   Assistance Needed Incidental touching; Adaptive equipment   Comment cues to slow down for safety   4 Steps CARE Score 4   QI: 12 Steps   Reason if not Attempted Activity not applicable   12 Steps CARE Score 9   Stairs   Type Stairs   # of Steps 6   Weight Bearing Precautions Fall Risk   Assist Devices Bilateral Rail   Findings needs to slow down for safety   Stairs (FIM) 2 - Patient goes up and down 4 - 11 stairs regardless of assist/device/setup   QI: Picking Up Object   Reason if not Attempted Activity not applicable   Picking Up Object CARE Score 9   QI: Toilet Transfer   Assistance Needed Incidental touching   Comment pt stood to urinate at toilet   Toilet Transfer CARE Score 4   Toilet Transfer   Surface Assessed Standard Toilet   Limitations Noted In Balance; Safety   Positioning Concerns Safety   Findings stood to urinate   Toilet Transfer (FIM) 4 - Patient requires steadying assist or light touching Assessment   Treatment Assessment pt cont to have decrease endurance nad becomes SOB with mobility  pt demonstrates decrease foot clearance and step length causing pt to take small, shuffled steps  RW was adjusted to improve posture and cues for RW safety to decrease fall risk  pt will benefit form cont therapy to improve strength, balance and endurance to progress with functional mobility and Ind  Problem List Decreased strength;Decreased endurance; Impaired balance;Decreased mobility; Decreased safety awareness;Pain   Barriers to Discharge Inaccessible home environment;Decreased caregiver support   PT Barriers   Physical Impairment Decreased strength;Decreased endurance; Impaired balance;Decreased mobility; Decreased safety awareness   Functional Limitation Walking;Stair negotiation;Car transfers   Plan   Treatment/Interventions Functional transfer training;LE strengthening/ROM; Endurance training;Gait training   Progress Progressing toward goals   Recommendation   Recommendation Home PT;Outpatient PT;24 hour supervision/assist   PT Therapy Minutes   PT Time In 1500   PT Time Out 1530   PT Total Time (minutes) 30   PT Mode of treatment - Individual (minutes) 30   PT Mode of treatment - Concurrent (minutes) 0   PT Mode of treatment - Group (minutes) 0   PT Mode of treatment - Co-treat (minutes) 0   PT Mode of Teatment - Total time(minutes) 30 minutes   Therapy Time missed   Time missed?  No

## 2017-12-13 LAB
GLUCOSE SERPL-MCNC: 148 MG/DL (ref 65–140)
GLUCOSE SERPL-MCNC: 274 MG/DL (ref 65–140)
GLUCOSE SERPL-MCNC: 65 MG/DL (ref 65–140)
GLUCOSE SERPL-MCNC: 94 MG/DL (ref 65–140)
INR PPP: 2.93 (ref 0.86–1.16)
PA ADP BLD-ACNC: 468 ARU
PROTHROMBIN TIME: 31 SECONDS (ref 12.1–14.4)

## 2017-12-13 PROCEDURE — 82948 REAGENT STRIP/BLOOD GLUCOSE: CPT

## 2017-12-13 PROCEDURE — 97530 THERAPEUTIC ACTIVITIES: CPT

## 2017-12-13 PROCEDURE — 97110 THERAPEUTIC EXERCISES: CPT

## 2017-12-13 PROCEDURE — 85576 BLOOD PLATELET AGGREGATION: CPT | Performed by: PHYSICAL MEDICINE & REHABILITATION

## 2017-12-13 PROCEDURE — 97535 SELF CARE MNGMENT TRAINING: CPT

## 2017-12-13 PROCEDURE — 85610 PROTHROMBIN TIME: CPT | Performed by: NURSE PRACTITIONER

## 2017-12-13 RX ORDER — WARFARIN SODIUM 2 MG/1
2 TABLET ORAL
Status: COMPLETED | OUTPATIENT
Start: 2017-12-13 | End: 2017-12-13

## 2017-12-13 RX ORDER — METOPROLOL TARTRATE 50 MG/1
100 TABLET, FILM COATED ORAL EVERY 12 HOURS SCHEDULED
Status: DISCONTINUED | OUTPATIENT
Start: 2017-12-13 | End: 2017-12-18 | Stop reason: HOSPADM

## 2017-12-13 RX ORDER — WARFARIN SODIUM 5 MG/1
5 TABLET ORAL
Status: DISCONTINUED | OUTPATIENT
Start: 2017-12-14 | End: 2017-12-18 | Stop reason: HOSPADM

## 2017-12-13 RX ADMIN — ACETAMINOPHEN 975 MG: 325 TABLET, FILM COATED ORAL at 05:35

## 2017-12-13 RX ADMIN — ERGOCALCIFEROL 50000 UNITS: 1.25 CAPSULE ORAL at 08:14

## 2017-12-13 RX ADMIN — AMLODIPINE BESYLATE 10 MG: 10 TABLET ORAL at 08:11

## 2017-12-13 RX ADMIN — ASPIRIN 81 MG 324 MG: 81 TABLET ORAL at 08:09

## 2017-12-13 RX ADMIN — ACETAMINOPHEN 975 MG: 325 TABLET, FILM COATED ORAL at 15:06

## 2017-12-13 RX ADMIN — INSULIN LISPRO 2 UNITS: 100 INJECTION, SOLUTION INTRAVENOUS; SUBCUTANEOUS at 11:36

## 2017-12-13 RX ADMIN — LISINOPRIL 40 MG: 20 TABLET ORAL at 08:10

## 2017-12-13 RX ADMIN — WARFARIN SODIUM 2 MG: 2 TABLET ORAL at 21:57

## 2017-12-13 RX ADMIN — GLYBURIDE 5 MG: 5 TABLET ORAL at 08:13

## 2017-12-13 RX ADMIN — DOXAZOSIN 4 MG: 4 TABLET ORAL at 08:11

## 2017-12-13 RX ADMIN — METOPROLOL TARTRATE 100 MG: 50 TABLET ORAL at 21:56

## 2017-12-13 RX ADMIN — METOPROLOL TARTRATE 75 MG: 50 TABLET ORAL at 08:10

## 2017-12-13 RX ADMIN — FOLIC ACID-PYRIDOXINE-CYANOCOBALAMIN TAB 2.5-25-2 MG 1 TABLET: 2.5-25-2 TAB at 08:24

## 2017-12-13 RX ADMIN — ACETAMINOPHEN 975 MG: 325 TABLET, FILM COATED ORAL at 21:56

## 2017-12-13 NOTE — TEAM CONFERENCE
Acute RehabilitationTeam Conference Note  Date: 12/13/2017   Time: 11:14 AM       Patient Name:  Reyna Wadsworth Record Number: [id number]   YOB: 1935  Sex: Male          Room/Bed:  [facility]8/[facility] 968-01  Payor Info:  Payor: Teresa Penna / Plan: MEDICARE A AND B / Product Type: Medicare A & B Fee for Service /      Admitting Diagnosis: Degenerative joint disease (DJD) of hip [M16 9]  Transient ischemic attack (TIA) [G45 9]   Admit Date/Time:  12/5/2017  1:31 PM  Admission Comments: No comment available     Primary Diagnosis:  Acute deep vein thrombosis (DVT) of brachial vein of left upper extremity (HCC)  Principal Problem: Acute deep vein thrombosis (DVT) of brachial vein of left upper extremity [facility])    Patient Active Problem List    Diagnosis Date Noted    Stroke ([facility] ) 11/24/2017    HTN (hypertension) 11/24/2017    DM2 (diabetes mellitus, type 2) ([place] [address] ) 11/24/2017    HLD (hyperlipidemia) 11/24/2017    CAD (coronary artery disease) 11/24/2017    Postoperative anemia 11/24/2017    Right hip pain 11/24/2017    Acute deep vein thrombosis (DVT) of brachial vein of left upper extremity ([facility] ) 11/24/2017    Coagulopathy ([place] [address] ) 11/24/2017       Physical Therapy:    Weight Bearing Status: Weight Bearing as Tolerated  Transfers: Supervision  Bed Mobility: Minimal Assistance  Amulation Distance (ft): 50 feet  Ambulation: Contact Guard  Assistive Device for Ambulation: Roller Walker  Number of Stairs: 4  Assistive Device for Stairs: Bilateral [employer]  Stair Assistance: Contact Guard  Ramp: Contact Guard  [address] [facility] for Ramp: [address]  Discharge Recommendations: Home with:  [address] with[de-identified] Family Support, First Floor Setup, Outpatient Physical Therapy    12/12/2017  Pt is showing improvement in functional mobility and activities  Pt has decreased tolerance for activity due to pain and limited ROM in RLE   Barriers include stairs, caregiver support, and the pt has decreased awareness of hip precautions  Xfers are at a S level with RW and occasional VC for sequencing when pt is in a rush to use the bathroom  Pt will benefit from skilled PT to increase activity tolerance, increase functional mobility, decrease pain and increase ROM in RLE  Continue POC as per PT     Occupational Therapy:  Eating: Supervision  Grooming: Supervision  Bathing: Minimal Assistance  Bathing: Minimal Assistance  Upper Body Dressing: Supervision  Lower Body Dressing: Minimal Assistance  Toileting: Contact Guard  Tub/Shower Transfer: Minimal Assistance  Toilet Transfer: Minimal Assistance  Cognition: Exceptions to WNL  Cognition: Decreased Memory, Decreased Safety, Decreased Executive Functions, Behavioral Considerations, Impulsive, Decreased Comprehension  Discharge Recommendations: Home with:  [address] with[de-identified] 24 Hour Supervision, 24 Hour Assistance       Pt overall presenting with dec safety, dec balance, poor insight into deficits, and limited strength  Pt participated in cognitive assessments and Pt scoring moderately impaired on MoCA 12/30 and 4 4/6 0 on ACLS indicating need for 24/7 S and A upon d/c  Pt at this time does NOT have 24/7 S and his family unable to provide  Pt does not demo safe behaviors with ADL fxn and management of RW  Speech Therapy:        Swallowing: Within Defined Limits  Diet Recommendations: Regular Diet, Thin  Discharge Recommendations: Home with:  [address] with[de-identified] Family Support  Pt was assessed for dysphagia, where he was on a level 3 diet w/ thin liquids  However, upon assessment, he was then advanced to regular w/ thin liquids due ability to tolerate solids w/o increased oropharyngeal sxs  Brief f/u occurred, where pt continued to demonstrate tolerance of regular/thin liquids at this time  No further SLP services warranted for dysphagia  Nursing Notes:  Appetite: Good  Diet Type: Diabetic                      Diet Patient/Family Education Complete: Yes    Type of Wound (LDA):  Incision Incision 11/24/17 Hip Right (Active)   Incision Description LEANN 12/12/2017  8:19 PM   Maria Elena-wound Assessment Clean;Dry 12/12/2017  6:08 PM   Closure None 12/12/2017  6:08 PM   Drainage Amount None 12/12/2017  8:19 PM   Drainage Description Serosanguineous 12/9/2017  8:44 AM   Treatments Cleansed 12/12/2017  6:08 PM   Dressing ABD 12/12/2017  8:19 PM   Wound packed? No 12/11/2017  8:40 AM   Dressing Changed Changed 12/12/2017  6:08 PM   Patient Tolerance Tolerated well 12/12/2017  6:08 PM   Dressing Status Intact 12/12/2017  8:19 PM           Type of Wound Patient/Family Education: Yes  Bladder: 5 - Supervision     Bladder Patient/Family Education: Yes  Bowel: 6 - Modified [place]     Bowel Patient/Family Education: Yes  Pain Location: Hip  Pain Orientation: Right  Pain Score: 0                       Hospital Pain Intervention(s): Medication (See MAR)  Pain Patient/Family Education: Yes  Medication Management/Safety  Injectable: Insulin  Safe Administration: Yes  Medication Patient/Family Education Complete: Yes    Admit to [facility] 12/05  cva status post hardware repair in R hip from fall  Was at [facility] post surgery and developed L sided weakness, facial droop and slurred speech  Sent to our ER and given TPA then developed hematoma in R hip hip  requiring blood trasfusions  DVT L brachial vein this admit  Hip insicion has staples- MAHESH  Pt uses Abd wedge in bed  Pain managed with tylenol and tramadol  No grasp with L hand  Pt is diabetic, managed with l humalog coverage and QID sugar checks  This week we will continue to monitor labs &  vital signs  We will work on pain management and diabetic management  We will prevent skin breakdown and monitor incision  Pt will work on safety with transfers and remain free from falls       Case Management:     Discharge Planning  Goal Length of Stay: 12  Living Arrangements: Spouse/significant other  Support Systems: Spouse/significant other  Assistance Needed: no  Type of Current Residence: Private residence  Current Home Care Services: No  Pt is participating well with therapy and both he and his wife have been educated on potential recommendations for contd therapy on dc  Following to assist with dc planning needs  Is the patient actively participating in therapies? yes  List any modifications to the treatment plan:     Barriers Interventions   cognition Family education,    Decreased insight Family eduation, safety eudcation techniques   Hip precautions and carryover Cueing for safe carryover, reminders             Is the patient making expected progress toward goals? yes  List any update or changes to goals:     Medical Goals: Patient will be medically stable for discharge to [facility] upon completion of rehab program and Patient will be able to manage medical conditions and comorbid conditions with medications and follow up upon completion of rehab program    Weekly Team Goals:   Rehab Team Goals  ADL Team Goal: Patient will require supervision with ADLs with least restrictive device upon completion of rehab program  Bowel/Bladder Team Goal: Patient will return to premorbid level for bladder/bowel management upon completion of rehab program  Transfer Team Goal: Patient will require supervision with transfers with least restrictive device upon completion of rehab program  Locomotion Team Goal: Patient will require supervision with locomotion with least restrictive device upon completion of rehab program  Cognitive Team Goal: Patient will be independent for basic  tasks and require supervision for complex tasks upon completion of rehab program    Discussion: in attendance to review pts progress is rn pt ot cm and physician  Pt's biggest barrier is cognition and insight into deficits  Family education has occurred with therapy that pt's brother who lives next door needs to be present when pt's sig other is away caring for her mother   Pt is currently contact guard superivion for functional mobility  Recommendations are for contd outpt physical therapy       Anticipated Discharge Date:  12/16/17

## 2017-12-13 NOTE — PROGRESS NOTES
Internal Medicine Progress Note  Patient: Jason You  Age/sex: [de-identified] y o  male  Medical Record #: [id number]      ASSESSMENT/PLAN:  Jason You is seen and examined and mangement for following issues:    1  TPA aborted CVA vs TIA/multiple areas intracranial atherosclerosis/severe stenosis inferior Rt MCA:  Continue ASA  Resume Plavix once pt completes a 6 month course of anticoagulation for Lt UE DVT  No statin as pt had hives in the past  Getting an API on 12/13/17  Staples being removed    2  [facility]:  Pain control per primary service  Follow-up with [facility]  3   Rt hip hematoma: occurred after getting tPA  Hemoglobin stable    4  Acute blood loss anemia;  s/p multiple transfusions:  Stable  5   Lt UE DVT:  had been on therapeutic Lovenox until INR > 2 = stopped 12/9/17 since INR was 2 0  Was on Coumadin 5mg qd and increased to 7 5 mg qd on 12/7/17 x 2 days then on 12/9/17 down to 6mg qd = Radha Fair jumped again today so will give only 2 mg tonight then drop to 5mg qd  Thrombosis Panel: + lupus anticoagulant, mildly decreased antithrombin III, PTT-LA mixing study prolonged at 53 sec, and hexagonal phase phospholipid elevated at 16  Pt will need Hematology follow-up and recheck lupus anticoagulant in 12 weeks    6  CAD with CONCEPCION:  Continue full ASA  For API 12/13/17    7  HTN: was not optimally controlled on Amlodipine 10mg daily, Cardura 4mg daily, Lisinopril 40mg daily and Lopressor 50mg every 12 hours so on 12/7/17  increased Lopressor to 75mg q12hrs  Improved but still a bit too high most BPs so will increase Lopressor to 100mg q12hrs  If this is not effective will change to Coreg    8  CKD stage III; baseline Cr 1 4-1 5: stable and actually below baseline currently  Will monitor  For BMP in AM    9  DM type 2:  HbA1C in April 7 3    Pt takes Glyburide 5mg 2 tabs in the AM and 1 tab in the PM  Added back glyburide 5mg qAM 12/8 and stopped the Lantus 10 U qhs = no change today since most BSs are controlled  , 207, 130, 109; fasting today 94/lunch 274    10  Vit D deficiency:  continue 50,000U weekly x 6 and then 1000U/day      Subjective: Patient seen and examined   Offered no complaints today    ROS:   GI: denies abdominal pain, change bowel habits or reflux symptoms  Neuro: No new neurologic changes  Respiratory: No Cough, SOB  Cardiovascular: No CP, palpitations     Scheduled Meds:    acetaminophen 975 mg Oral Q8H [address]   amLODIPine 10 mg Oral Daily   aspirin 324 mg Oral Daily   ergocalciferol 50,000 Units Oral Weekly   Followed by      Óscar Trivedi ON 1/17/2018] cholecalciferol 1,000 Units Oral Daily   doxazosin 4 mg Oral Daily   folic acid-pyridoxine-cyanocobalamin 1 tablet Oral Daily   glyBURIDE 5 mg Oral Daily With Breakfast   insulin lispro 1-5 Units Subcutaneous TID AC   insulin lispro 1-5 Units Subcutaneous HS   lisinopril 40 mg Oral Daily   metoprolol tartrate 75 mg Oral Q12H [address]   polyethylene glycol 17 g Oral Daily   warfarin 2 mg Oral Once (warfarin)   [START ON 12/14/2017] warfarin 5 mg Oral Daily (warfarin)       Labs:       Results from last 7 days  Lab Units 12/11/17  0548 12/08/17  0554   WBC Thousand/uL 4 84 6 01   HEMOGLOBIN g/dL 8 2* 8 2*   HEMATOCRIT % 25 2* 25 0*   PLATELETS Thousands/uL 348 387       Results from last 7 days  Lab Units 12/11/17  0548 12/08/17  0554   SODIUM mmol/L 137 134*   POTASSIUM mmol/L 4 6 4 5   CHLORIDE mmol/L 106 102   CO2 mmol/L 25 26   BUN mg/dL 29* 27*   CREATININE mg/dL 1 24 1 38*   GLUCOSE RANDOM mg/dL 91 130   CALCIUM mg/dL 8 5 8 6           Results from last 7 days  Lab Units 12/13/17  0600 12/12/17  0545   INR  2 93* 2 65*          Glucose (mg/dL)   Date Value   12/11/2017 91   12/08/2017 130   12/05/2017 129   12/01/2017 127   12/01/2015 141 (H)   07/14/2015 124 (H)   09/11/2014 116 (H)   01/09/2014 134 (H)     Glucose, i-STAT (mg/dl)   Date Value   11/24/2017 171 (H)       Labs reviewed    Physical Examination:  Vitals:   Vitals:    12/12/17 1356 12/12/17 2033 12/13/17 0555 12/13/17 0809   BP: 138/64 166/78 165/81 150/77   Pulse: 59 70 66 68   Resp: 20 20 18 18   Temp: 98 4 °F (36 9 °C) 97 6 °F (36 4 °C) 98 9 °F (37 2 °C) 98 3 °F (36 8 °C)   TempSrc: Oral Tympanic Oral Oral   SpO2: 98% 98% 96% 98%   Weight:   102 kg (223 lb 15 8 oz)    Height:           GEN: NAD  HEENT: NC/AT  RESP: BBS w/o crackles/wheeze/rhonci; resp unlabored  CV: +S1 S2, regular rate, no rubs/murmurs  ABD: soft, NT, ND, normal BS   : no villarreal  EXT: 1+ RLE edema = continue TEDS  Skin: no rashes  Neuro: AAO, limited movement of Right leg 2/2 pain/surgery otherwise rest of extremities 5/5      [x ] Total time spent: 30 Mins and greater than 50% of this time was spent counseling/coordinating care        Iris Babin, Jessica Morse  Internal Medicine

## 2017-12-13 NOTE — PROGRESS NOTES
12/13/17 0830   Pain Assessment   Pain Assessment 0-10   Pain Score No Pain   Restrictions/Precautions   Precautions Bed/chair alarms; Fall Risk   Weight Bearing Restrictions No   RLE Weight Bearing Per Order WBAT   QI: Lower Body Dressing   Assistance Needed Supervision   Assistance Provided by Verbena No physical assistance   Comment Pt able to don shoes with velcro with use of reacher  Lower Body Dressing CARE Score 4   QI: Sit to Stand   Assistance Needed Incidental touching   Assistance Provided by Verbena No physical assistance   Sit to Stand CARE Score 4   QI: Chair/Bed-to-Chair Transfer   Assistance Needed Incidental touching   Assistance Provided by Verbena Less than 25%   Chair/Bed-to-Chair Transfer CARE Score 3   Kitchen Mobility   Kitchen-Mobility Level Walker   Kitchen Activity Retrieve items;Transport items   Kitchen Mobility Comments Pt completed kitchen mobility for simple meal prep ([employer] sandwich)  Pt shown walker tray and walker bag for easy item transportation  Pt utlized walker tray approprately but requires occasional verbal cueing less than 25% of time to position RW safely during item retrival  Pt able to stand for meal prep 15 minutes wtihout need for rest break  Health Management   Health Management pt engaged in medication management using current medication list  Pt able to fill pill box appropriately and able to identify need to call in perscription when medication was empty  Pt however was unaware of being on insulin at this time and states, "that must be new"  Educated pt on recommendationt o have inital supervision with medication set up to ensure safety with new medications      Right Upper Extremity- Strength   R Shoulder Flexion;ABduction   R Elbow Elbow extension;Elbow flexion   Equipment Dumbbell   R Weight/Reps/Sets 2 sets 15 reps    Left Upper Extremity-Strength   L Shoulder Flexion;ABduction   L Elbow Elbow flexion;Elbow extension   Equipment Dumbell   L Weights/Reps/Sets 2 sets 15 reps   Cognition   Overall Cognitive Status Impaired   Arousal/Participation Alert; Cooperative   Attention Attends with cues to redirect   Activity Tolerance   Activity Tolerance Patient tolerated treatment well   Assessment   Treatment Assessment Pt engaged in OT session with focus on higher level balance, simple meal prep, UE strengthening and fxnl mobility  Pt did become dizzy during fxnl mobility requring therapist to pull up chair behind him  Pt BP was 148/70 while seated  Pt demonstrates decreased safety during OT session and ? higher level cognitive deficits  Pt requires cueing with safety with RW during sesion and thus recommend pt not be left alone during day  Pt states his brother can come over when his girlfriend is caring for her mother  However PT spoke to pt's son and son states that the pt's brother has cognitive deficits and needs direction  Thus it is not recommended that pt have supervision from his brother upon d/c  Continue with POC with focus on d/c planning, higher level cognitive reintegration, and fxnl mobility in unfimilar environments  Plan   Treatment/Interventions ADL retraining;Functional transfer training; Endurance training;Cognitive reorientation;Patient/family training   Progress Progressing toward goals   Recommendation   OT Discharge Recommendation 24 hour supervision/assist   OT Therapy Minutes   OT Time In 0830   OT Time Out 1000   OT Total Time (minutes) 90   OT Mode of treatment - Individual (minutes) 90   OT Mode of treatment - Concurrent (minutes) 0   OT Mode of treatment - Group (minutes) 0   OT Mode of treatment - Co-treat (minutes) 0   OT Mode of Teatment - Total time(minutes) 90 minutes   Therapy Time missed   Time missed?  No

## 2017-12-13 NOTE — PROGRESS NOTES
12/13/17 1230   Pain Assessment   Pain Assessment 0-10   Pain Score 4   Pain Type Acute pain   Pain Location Hip   Pain Orientation Right   Pain Descriptors Aching   Pain Frequency Constant/continuous   Clinical Progression Not changed   Patient's Stated Pain Goal No pain   Hospital Pain Intervention(s) Ambulation/increased activity; Rest   Restrictions/Precautions   Precautions Bed/chair alarms; Fall Risk;Limb alert  (LUE)   Weight Bearing Restrictions Yes   RLE Weight Bearing Per Order WBAT   ROM Restrictions Yes   RLE ROM Restriction Range Limitation  (MAREN precautions)   Cognition   Overall Cognitive Status Impaired   Arousal/Participation Alert; Cooperative   Attention Attends with cues to redirect   Orientation Level Oriented X4   Memory Within functional limits   Following Commands Follows all commands and directions without difficulty   Subjective   Subjective pt reports pain 4/10 and requests ice after session   QI: Sit to Stand   Assistance Needed Supervision; Adaptive equipment   Comment RW   Sit to Stand CARE Score 4   QI: Chair/Bed-to-Chair Transfer   Assistance Needed Supervision; Adaptive equipment   Comment RW, WC<> chair   Chair/Bed-to-Chair Transfer CARE Score 4   Transfer Bed/Chair/Wheelchair   Limitations Noted In Pain Management;UE Strength;LE Strength   Adaptive Equipment Roller Walker   Stand Pivot Supervision   Sit to Stand Supervision   Stand to Sit Supervision   All Transfer Supervision   Bed, Chair, Wheelchair Transfer (FIM) 5 - Patient requires supervision/monitoring   QI: Walk 10 Feet   Assistance Needed Adaptive equipment;Supervision   Walk 10 Feet CARE Score 4   QI: Walk 50 Feet with Two Turns   Assistance Needed Adaptive equipment;Supervision   Walk 50 Feet with Two Turns CARE Score 4   QI: Walk 150 Feet   Assistance Needed Adaptive equipment;Supervision   Walk 150 Feet CARE Score 4   Ambulation   Does the patient walk? 2   Yes   Primary Discharge Mode of Locomotion Walk   Walk Assist Level Supervision   Gait Pattern Inconsistant Ally; Slow Ally;Decreased R stance; Step through; Improper weight shift   Assist Device Roller Walker   Distance Walked (feet) 150 ft  (150x1 and 250x1 with RW and S)   Limitations Noted In Endurance; Heel Strike;Speed;Strength;Swing   Findings decreased stance on RLE and heavily relies on UE support   Walking (FIM) 5 - Patient requires supervision/monitoring AND distance 150 feet or more, no rest   QI: Toilet Transfer   Assistance Needed Supervision   Comment pt stood at standard toilet to urinate   Toilet Transfer CARE Score 4   Toilet Transfer   Surface Assessed Standard Toilet   Limitations Noted In [address]   Findings stood to urinate   Toilet Transfer (FIM) 5 - Patient requires supervision/monitoring   Therapeutic Interventions   Strengthening seated LAQ 10x2   Flexibility BLE hamstring and gastroc stretch 60"x1 each LE   Neuromuscular Re-Education chair <> xfer, amb 150'x1 and 250'x1   Other Comments   Comments pt has frequent bathroom urges which are sudden in nature   Assessment   Treatment Assessment session focused on increasing activity tolerance and amb distance, pt appears to take short breaths when he walks leading to decreased endurance and high exertion; limited motion in RLE due to pain and muscle tightness, stretching would be of benefit if tolerable; pt performs safe xfer from WC<>chair with VC to use the walker; continue increasing endurance and ROM in BLE for increased functional mobility; continue POC as per PT   Problem List Decreased strength;Decreased endurance; Impaired balance;Decreased mobility; Decreased safety awareness;Pain   Barriers to Discharge Decreased caregiver support   PT Barriers   Physical Impairment Decreased strength;Decreased endurance; Impaired balance;Decreased mobility; Decreased safety awareness   Functional Limitation Walking   Plan Treatment/Interventions Functional transfer training;LE strengthening/ROM; Elevations; Therapeutic exercise; Endurance training;Patient/family training;Bed mobility   Progress Progressing toward goals   Recommendation   Recommendation Outpatient PT;24 hour supervision/assist   Equipment Recommended Walker   PT Therapy Minutes   PT Time In 1230   PT Time Out 1300   PT Total Time (minutes) 30   PT Mode of treatment - Individual (minutes) 30   PT Mode of treatment - Concurrent (minutes) 0   PT Mode of treatment - Group (minutes) 0   PT Mode of treatment - Co-treat (minutes) 0   PT Mode of Teatment - Total time(minutes) 30 minutes   Therapy Time missed   Time missed?  No

## 2017-12-14 LAB
ANION GAP SERPL CALCULATED.3IONS-SCNC: 5 MMOL/L (ref 4–13)
BACTERIA UR QL AUTO: ABNORMAL /HPF
BASOPHILS # BLD AUTO: 0.03 THOUSANDS/ΜL (ref 0–0.1)
BASOPHILS NFR BLD AUTO: 1 % (ref 0–1)
BILIRUB UR QL STRIP: NEGATIVE
BUN SERPL-MCNC: 31 MG/DL (ref 5–25)
CALCIUM SERPL-MCNC: 8.8 MG/DL (ref 8.3–10.1)
CHLORIDE SERPL-SCNC: 104 MMOL/L (ref 100–108)
CLARITY UR: CLEAR
CO2 SERPL-SCNC: 26 MMOL/L (ref 21–32)
COLOR UR: ABNORMAL
CREAT SERPL-MCNC: 1.37 MG/DL (ref 0.6–1.3)
EOSINOPHIL # BLD AUTO: 0.25 THOUSAND/ΜL (ref 0–0.61)
EOSINOPHIL NFR BLD AUTO: 4 % (ref 0–6)
ERYTHROCYTE [DISTWIDTH] IN BLOOD BY AUTOMATED COUNT: 17.8 % (ref 11.6–15.1)
GFR SERPL CREATININE-BSD FRML MDRD: 48 ML/MIN/1.73SQ M
GLUCOSE SERPL-MCNC: 114 MG/DL (ref 65–140)
GLUCOSE SERPL-MCNC: 126 MG/DL (ref 65–140)
GLUCOSE SERPL-MCNC: 128 MG/DL (ref 65–140)
GLUCOSE SERPL-MCNC: 149 MG/DL (ref 65–140)
GLUCOSE SERPL-MCNC: 159 MG/DL (ref 65–140)
GLUCOSE UR STRIP-MCNC: NEGATIVE MG/DL
HCT VFR BLD AUTO: 27.8 % (ref 36.5–49.3)
HGB BLD-MCNC: 9 G/DL (ref 12–17)
HGB UR QL STRIP.AUTO: NEGATIVE
HYALINE CASTS #/AREA URNS LPF: ABNORMAL /LPF
INR PPP: 2.49 (ref 0.86–1.16)
KETONES UR STRIP-MCNC: NEGATIVE MG/DL
LEUKOCYTE ESTERASE UR QL STRIP: NEGATIVE
LYMPHOCYTES # BLD AUTO: 1.28 THOUSANDS/ΜL (ref 0.6–4.47)
LYMPHOCYTES NFR BLD AUTO: 22 % (ref 14–44)
MCH RBC QN AUTO: 30.8 PG (ref 26.8–34.3)
MCHC RBC AUTO-ENTMCNC: 32.4 G/DL (ref 31.4–37.4)
MCV RBC AUTO: 95 FL (ref 82–98)
MONOCYTES # BLD AUTO: 0.5 THOUSAND/ΜL (ref 0.17–1.22)
MONOCYTES NFR BLD AUTO: 8 % (ref 4–12)
NEUTROPHILS # BLD AUTO: 3.86 THOUSANDS/ΜL (ref 1.85–7.62)
NEUTS SEG NFR BLD AUTO: 65 % (ref 43–75)
NITRITE UR QL STRIP: NEGATIVE
NON-SQ EPI CELLS URNS QL MICRO: ABNORMAL /HPF
NRBC BLD AUTO-RTO: 0 /100 WBCS
PH UR STRIP.AUTO: 5.5 [PH] (ref 4.5–8)
PLATELET # BLD AUTO: 312 THOUSANDS/UL (ref 149–390)
PMV BLD AUTO: 8.1 FL (ref 8.9–12.7)
POTASSIUM SERPL-SCNC: 4.8 MMOL/L (ref 3.5–5.3)
PROT UR STRIP-MCNC: ABNORMAL MG/DL
PROTHROMBIN TIME: 27.2 SECONDS (ref 12.1–14.4)
RBC # BLD AUTO: 2.92 MILLION/UL (ref 3.88–5.62)
RBC #/AREA URNS AUTO: ABNORMAL /HPF
SODIUM SERPL-SCNC: 135 MMOL/L (ref 136–145)
SP GR UR STRIP.AUTO: 1.02 (ref 1–1.03)
UROBILINOGEN UR QL STRIP.AUTO: 0.2 E.U./DL
WBC # BLD AUTO: 5.95 THOUSAND/UL (ref 4.31–10.16)
WBC #/AREA URNS AUTO: ABNORMAL /HPF

## 2017-12-14 PROCEDURE — 97530 THERAPEUTIC ACTIVITIES: CPT

## 2017-12-14 PROCEDURE — 85025 COMPLETE CBC W/AUTO DIFF WBC: CPT | Performed by: NURSE PRACTITIONER

## 2017-12-14 PROCEDURE — 82948 REAGENT STRIP/BLOOD GLUCOSE: CPT

## 2017-12-14 PROCEDURE — 97532 HB COGNITIVE SKILLS DEVELOPMENT: CPT | Performed by: OCCUPATIONAL THERAPY ASSISTANT

## 2017-12-14 PROCEDURE — 97530 THERAPEUTIC ACTIVITIES: CPT | Performed by: OCCUPATIONAL THERAPY ASSISTANT

## 2017-12-14 PROCEDURE — 85610 PROTHROMBIN TIME: CPT | Performed by: NURSE PRACTITIONER

## 2017-12-14 PROCEDURE — 97110 THERAPEUTIC EXERCISES: CPT

## 2017-12-14 PROCEDURE — 97535 SELF CARE MNGMENT TRAINING: CPT | Performed by: OCCUPATIONAL THERAPY ASSISTANT

## 2017-12-14 PROCEDURE — 80048 BASIC METABOLIC PNL TOTAL CA: CPT | Performed by: NURSE PRACTITIONER

## 2017-12-14 PROCEDURE — 81001 URINALYSIS AUTO W/SCOPE: CPT | Performed by: PHYSICIAN ASSISTANT

## 2017-12-14 PROCEDURE — 97116 GAIT TRAINING THERAPY: CPT

## 2017-12-14 RX ADMIN — DOXAZOSIN 4 MG: 4 TABLET ORAL at 09:05

## 2017-12-14 RX ADMIN — ACETAMINOPHEN 975 MG: 325 TABLET, FILM COATED ORAL at 06:00

## 2017-12-14 RX ADMIN — POLYETHYLENE GLYCOL 3350 17 G: 17 POWDER, FOR SOLUTION ORAL at 09:07

## 2017-12-14 RX ADMIN — ACETAMINOPHEN 975 MG: 325 TABLET, FILM COATED ORAL at 13:34

## 2017-12-14 RX ADMIN — AMLODIPINE BESYLATE 10 MG: 10 TABLET ORAL at 09:05

## 2017-12-14 RX ADMIN — GLYBURIDE 5 MG: 5 TABLET ORAL at 09:11

## 2017-12-14 RX ADMIN — WARFARIN SODIUM 5 MG: 5 TABLET ORAL at 17:18

## 2017-12-14 RX ADMIN — FOLIC ACID-PYRIDOXINE-CYANOCOBALAMIN TAB 2.5-25-2 MG 1 TABLET: 2.5-25-2 TAB at 09:05

## 2017-12-14 RX ADMIN — LISINOPRIL 40 MG: 20 TABLET ORAL at 09:06

## 2017-12-14 RX ADMIN — METOPROLOL TARTRATE 100 MG: 50 TABLET ORAL at 09:06

## 2017-12-14 RX ADMIN — ASPIRIN 81 MG 324 MG: 81 TABLET ORAL at 09:04

## 2017-12-14 RX ADMIN — METOPROLOL TARTRATE 100 MG: 50 TABLET ORAL at 21:27

## 2017-12-14 RX ADMIN — INSULIN LISPRO 1 UNITS: 100 INJECTION, SOLUTION INTRAVENOUS; SUBCUTANEOUS at 21:27

## 2017-12-14 RX ADMIN — ACETAMINOPHEN 975 MG: 325 TABLET, FILM COATED ORAL at 21:27

## 2017-12-14 NOTE — PROGRESS NOTES
12/14/17 1230   Pain Assessment   Pain Assessment 0-10   Pain Score 4   Pain Type Acute pain   Pain Location Hip   Pain Orientation Right   Restrictions/Precautions   Precautions Bed/chair alarms;Cognitive; Fall Risk;Pacemaker;Pain;THR   RLE Weight Bearing Per Order WBAT   Subjective   Subjective pt agreeable to perform PT session    Bed Mobility   Able to Roll Left to Right;Right to Left   Findings S level    Transfer Bed/Chair/Wheelchair   Limitations Noted In UE Strength;LE Strength; Endurance   Adaptive Equipment Roller Walker   Car Transfer Supervision   All Transfer Supervision   Findings S level   [employer], Chair, Wheelchair Transfer (FIM) 5 - Patient requires supervision/monitoring   Ambulation   Does the patient walk? 2  Yes   Primary Discharge Mode of Locomotion Walk   Walk Assist Level Supervision   Gait Pattern Decreased foot clearance; Inconsistant Ally   Assist Device Rollator;Roller Walker   Distance Walked (feet) 153 ft  (x2)   Limitations Noted In Endurance; Heel Strike; Sequencing;Speed;Strength   Findings instruction for safety with rollator    Walking (FIM) 5 - Patient requires supervision/monitoring AND distance 150 feet or more, no rest   Wheelchair mobility   Wheelchair (FIM) 0 - Activity does not occur   Toilet Transfer   Surface Assessed Standard Toilet   Limitations Noted In LE Strength;UE Strength   Adaptive Equipment Grab Bar   Positioning Concerns Safety   Findings stood to urinate    Toilet Transfer (FIM) 5 - Patient requires supervision/monitoring   Therapeutic Interventions   Strengthening supine SAQ AP SLR Hip Add/Abd x20 bed mobility log rolling    Flexibility (PROM heelslides )   Balance static standing in bathroom    Equipment Use   NuStep level 2 for 10 min    Assessment   Treatment Assessment progressing with functional activities , instructed safety percaution with rollator , hand , brakes and sitting percautions   Pt need rollator and RW for walking support d/t B/L LE weakness and dec strengthen  Pt also will cont to benefit with PT session focus on stairs , ambulationa nd overal functional activities , pt also understand hip precaution 3/3 , pt return to his room in recliner with all needs in reach    Problem List Decreased strength;Decreased range of motion;Decreased endurance; Impaired balance;Decreased mobility; Decreased coordination;Decreased safety awareness; Impaired judgement;Decreased skin integrity;Orthopedic restrictions;Pain   Plan   Treatment/Interventions Functional transfer training;LE strengthening/ROM; Elevations; Therapeutic exercise; Endurance training;Patient/family training;Bed mobility;Gait training;OT   Progress Progressing toward goals   Recommendation   Recommendation Outpatient PT;24 hour supervision/assist   Equipment Recommended Walker   PT Therapy Minutes   PT Time In 1230   PT Time Out 1330   PT Total Time (minutes) 60   PT Mode of treatment - Individual (minutes) 60   PT Mode of treatment - Concurrent (minutes) 0   PT Mode of treatment - Group (minutes) 0   PT Mode of treatment - Co-treat (minutes) 0   PT Mode of Teatment - Total time(minutes) 60 minutes   Therapy Time missed   Time missed?  No

## 2017-12-14 NOTE — PROGRESS NOTES
12/14/17 0830   Pain Assessment   Pain Assessment 0-10   Pain Score 3   Pain Type Acute pain   Pain Location Hip   Pain Orientation Right   Restrictions/Precautions   Precautions Bed/chair alarms; Fall Risk;Limb alert;THR   RLE Weight Bearing Per Order WBAT   ROM Restrictions Yes   Subjective   Subjective pt states feeling okay and ready for PT session    Bed Mobility   Findings S level    Transfer Bed/Chair/Wheelchair   Limitations Noted In UE Strength;LE Strength; Endurance   Adaptive Equipment Roller Walker   Car Transfer Supervision   Findings S level    [employer], Chair, Wheelchair Transfer (FIM) 5 - Patient requires supervision/monitoring   Ambulation   Does the patient walk? 2  Yes   Primary Discharge Mode of Locomotion Walk   Walk Assist Level Supervision   Gait Pattern Inconsistant Ally;Decreased foot clearance;Narrow RADHA; Improper weight shift   Assist Device Roller Walker;Rollator   Distance Walked (feet) 160 ft  (x4)   Limitations Noted In Endurance; Heel Strike; Sequencing;Speed;Strength   Findings forward flexion short stride    Walking (FIM) 5 - Patient requires verbal cues AND distance 150 feet or more, no rest   Wheelchair mobility   Wheelchair (FIM) 0 - Activity does not occur   Stairs   Type Stairs;Curb;Ramp   # of Steps 12   Weight Bearing Precautions WBAT;RLE   Assist Devices Bilateral Rail;Roller Walker   Findings vc for safety slow down    Stairs (FIM) 5 - Patient requires supervision/monitoring AND goes up and down full flight (12- 14 stairs)   Toilet Transfer   Surface Assessed Standard Toilet   Limitations Noted In UE Strength   Adaptive Equipment Grab Bar   Positioning Concerns Safety   Findings stood to urinate    Toilet Transfer (FIM) 5 - Patient requires supervision/monitoring   Therapeutic Interventions   Strengthening LAQ QS AP X20 gluts x12 marching x10    Flexibility PROM B/LLE    Balance Static standing    Assessment   Treatment Assessment pt instructed and reinforce safety percaution during ambulation with RW and trail rollator as above  Pt cueing for ascending and descending steps trainers  B/L handrails  Pt inc activitiy tolerance and ambulation distance , Pt instructed with rollator for safety breaks and sitting down   pt also limited with RLE strengthening and having pain   pt will cont toward goals and benefit with PT    Problem List Decreased strength;Decreased endurance;Decreased mobility; Decreased safety awareness;Pain;Orthopedic restrictions;Decreased skin integrity   Barriers to Discharge Decreased caregiver support   PT Barriers   Physical Impairment Decreased strength;Decreased endurance;Decreased mobility; Impaired balance; Impaired judgement;Decreased safety awareness   Functional Limitation Walking   Plan   Treatment/Interventions Functional transfer training;LE strengthening/ROM; Therapeutic exercise; Endurance training;Cognitive reorientation;Patient/family training;Gait training   Progress Progressing toward goals   PT Therapy Minutes   PT Time In 0830   PT Time Out 0930   PT Total Time (minutes) 60   PT Mode of treatment - Individual (minutes) 60   PT Mode of treatment - Concurrent (minutes) 0   PT Mode of treatment - Group (minutes) 0   PT Mode of treatment - Co-treat (minutes) 0   PT Mode of Teatment - Total time(minutes) 60 minutes   Therapy Time missed   Time missed?  No

## 2017-12-14 NOTE — SOCIAL WORK
Cm phoned pt's sig other Paul Morales and reviewed pts plans for dc  Cm inquired if pt would have supervision and she stated she is in and out but usually when she leaves the patient is in bed with the dog  Paul Morales stated pt's brother who lived next door would be able to come over when she was not present  Paul Morales stated "they would manage and this is nothing new"  Cm phoned pts son Krysten Jones as CM was uncomfortable with the responsed from Paul Morales  Per Krysten Jones pt's brother's would be ok to hangout but otherwise they would not be much help in the supervision area or if a problem arose  Eagleemma Robert is aware of Bushra's perception of the current situation and states she has a lot going on and could not be considered reliable  Gaspercandis Jones feels if his dad were to have an additional few weeks of therapy it would be a better situation for him to return home to  Krysten Jones offered [facility] as an option only because it was presented to him while they were at the [facility]  CM explained medicare benefits, need for admission criteria at subacute level and emailed list of choices to him for his review  Krysten Jones will speak with Bushra and the patient and let them know that this should be the plan  Awaiting call back from [facility] with choices for referrals  Received call from pt's son Krysten Jones who confirmed he received the snf list  Cm answered questions pertaining to the medicare appeal process, how los is derived and the eligibility of pt staying beyond the weekend  Krysten Jones understands acute rehab process and is researching options of subacute/snf facilities on line  Cm sent ecin referral to [facility] to see if they could skill pt for additional rehab, although after looking online Krysten Jones is not sure he wants that to be his first choice  Krysten Jones will follow up later today  Son Krysten Jones phoned and provided options for contd rehab in this order of preference, [facility], [address] [place] [address], [place] [address] and [facility]   Referral sent, awaiting determination

## 2017-12-14 NOTE — CONSULTS
[facility] NOTE   Admission Date: 12/5/2017    Patient Identifiers: Obed Serrano (MRN: [id number])[id number], [de-identified] y o , 1935  Service Requesting Consultation: Ciera Villanueva MD (PM&R)  Service Providing Consultation:  Urology, Cynthia Singletary PA-C  Inpatient consult to Urology  Consult performed by: Windy Archibald ordered by: Author Jose        Date of Service: 12/14/2017    Reason for Consultation: Urology evaluation    History of Present Illness:     Obed Serrano is a [de-identified] y o  old with a history of BPH and elevated PSA  Sees Dr Maxim Michael in [facility]  Had a right HIP revision at [place] [address]  and went to [facility]  He developed left sided weakness and dysarthria and transported to the ER for a stroke alert  The CT was negative and he had TPA which was stopped  Unfortunately he developed a right hip hematoma post TPA and ABLA requiring  Transfusions  Now on [facility] for post acute rehab  Last night he complained of urgency and frequency  He denies lower tract symptoms while at home  He denies a prior prostate biopsy but has a PSA in the 6-8 range over the last few years  His urinalysis showed microscopic blood earlier in the hospital stay  Denies burning or dysuria  Denies gross hematuria       Past Medical, Past Surgical History:     Past Medical History:   Diagnosis Date    CAD (coronary artery disease)     DM[facility], type 2) [facility]     History of transfusion     HLD (hyperlipidemia)     HTN (hypertension)    :    Past Surgical History:   Procedure Laterality Date    HIP HARDWARE REMOVAL      TOTAL HIP ARTHROPLASTY Right    :    Medications, Allergies:     Current Facility-Administered Medications:     acetaminophen (TYLENOL) tablet 975 mg, 975 mg, Oral, Q8H [facility], Ciera Villanueva MD, 975 mg at 12/14/17 0600    amLODIPine (NORVASC) tablet 10 mg, 10 mg, Oral, Daily, Ciera Villanueva MD, 10 mg at 12/13/17 0192    aspirin chewable tablet 324 mg, 324 mg, Oral, Daily, Floyd Banegas MD, 324 mg at 12/13/17 0809    bisacodyl (DULCOLAX) rectal suppository 10 mg, 10 mg, Rectal, Daily PRN, Floyd Banegas MD    ergocalciferol (VITAMIN D2) capsule 50,000 Units, 50,000 Units, Oral, Weekly, 50,000 Units at 12/13/17 0814 **FOLLOWED BY** [START ON 1/17/2018] cholecalciferol (VITAMIN D3) tablet 1,000 Units, 1,000 Units, Oral, Daily, JALEEL Keys    doxazosin (CARDURA) tablet 4 mg, 4 mg, Oral, Daily, Floyd Banegas MD, 4 mg at 11/47/16 0241    folic acid-pyridoxine-cyanocobalamin 2 5-25-2 mg per tablet 1 tablet, 1 tablet, Oral, Daily, Floyd Banegas MD, 1 tablet at 12/13/17 0622    glyBURIDE (DIABETA) tablet 5 mg, 5 mg, Oral, Daily With Breakfast, JALEEL Keys, 5 mg at 12/13/17 0813    insulin lispro (HumaLOG) 100 units/mL subcutaneous injection 1-5 Units, 1-5 Units, Subcutaneous, TID AC, 2 Units at 12/13/17 1136 **AND** Fingerstick Glucose (POCT), , , TID AC, Floyd Banegas MD    insulin lispro (HumaLOG) 100 units/mL subcutaneous injection 1-5 Units, 1-5 Units, Subcutaneous, HS, Floyd Banegas MD, 1 Units at 12/11/17 2154    lisinopril (ZESTRIL) tablet 40 mg, 40 mg, Oral, Daily, Floyd Banegas MD, 40 mg at 12/13/17 0810    magnesium hydroxide (MILK OF MAGNESIA) 400 mg/5 mL oral suspension 30 mL, 30 mL, Oral, Daily PRN, Floyd Banegas MD    metoprolol tartrate (LOPRESSOR) tablet 100 mg, 100 mg, Oral, Q12H [address], Arelia Deisy CRNP, 100 mg at 12/13/17 2156    polyethylene glycol (MIRALAX) packet 17 g, 17 g, Oral, Daily, Floyd Banegas MD, Stopped at 12/10/17 8891    traMADol (ULTRAM) tablet 50 mg, 50 mg, Oral, BID PRN, Surjit Xavier DO, 50 mg at 12/10/17 9928    warfarin (COUMADIN) tablet 5 mg, 5 mg, Oral, Daily (warfarin), JALEEL Keys    Allergies:   Allergies   Allergen Reactions    Celecoxib     Hydromorphone     Pravastatin Hives   :    Social and Family History:   Social History:   Social History   Substance Use Topics    Smoking status: Never Smoker    Smokeless tobacco: Never Used    Alcohol use No        History   Smoking Status    Never Smoker   Smokeless Tobacco    Never Used       Family History:  Family History   Problem Relation Age of Onset    Family history unknown: Yes   :     Review of Systems:     General: Fever, chills, or night sweats: negative  Cardiac: Negative for chest pain  Pulmonary: Negative for shortness of breath  Gastrointestinal: Abdominal pain negative  Nausea, vomiting, or diarrhea negative,  Genitourinary: See HPI above  Patient does not have hematuria  All other systems queried were negative  Physical Exam:   General: Patient is pleasant and in NAD  Awake and alert  /70   Pulse 62   Temp 98 2 °F (36 8 °C) (Oral)   Resp 20   Ht 5' 10" (1 778 m)   Wt 102 kg (223 lb 15 8 oz)   SpO2 96%   BMI 32 14 kg/m²   Cardiac: Peripheral edema: negative  Pulmonary: Non-labored breathing  Abdomen: Soft, non-tender, non-distended  No surgical scars  No masses, tenderness, hernias noted  Genitourinary: Negative CVA tenderness, negative suprapubic tenderness  (Male): Penis circumcised, phallus normal, meatus patent  Testicles descended into scrotum bilaterally without masses nor tenderness  No inguinal hernias bilaterally       URINE: clear      Labs:     Lab Results   Component Value Date    HGB 9 0 (L) 12/14/2017    HCT 27 8 (L) 12/14/2017    WBC 5 95 12/14/2017     12/14/2017   ]    Lab Results   Component Value Date     12/11/2017    K 4 6 12/11/2017     12/11/2017    CO2 25 12/11/2017    BUN 29 (H) 12/11/2017    CREATININE 1 24 12/11/2017    CALCIUM 8 5 12/11/2017    GLUCOSE 91 12/11/2017   ]    Imaging:   I personally reviewed the images and report of the following studies, and reviewed them with the patient:  CT ABDOMEN AND PELVIS WITHOUT IV CONTRAST     KIDNEYS/URETERS:  Stable hyperdense and hypodense left renal lesions  No hydronephrosis or nephrolithiasis  PELVIS     REPRODUCTIVE ORGANS:  Unremarkable for patient's age  URINARY BLADDER:  Incompletely distended  Circumferential bladder wall thickening  ASSESSMENT:     1  Urinary frequency  2  BPH  3  Elevated PSA  4  Microscopic hematuria   5  TIA/CVA  6  Roght hip revision    PLAN:     - check PVR and urinalysis  - already on doxazosin 4mg  - improve bowel program  - will check records from Dr Cristiano Mcgarry      Thank you for allowing me to participate in this patients care  Please do not hesitate to call with any additional questions    Angi Zavala PA-C

## 2017-12-14 NOTE — PROGRESS NOTES
Occupational Therapy Treatment Note:     12/14/17 4840   Pain Assessment   Pain Assessment 0-10   Pain Score 7   Pain Location Hip   Pain Orientation Right   Hospital Pain Intervention(s) Medication (See MAR); Cold applied; Rest   Restrictions/Precautions   Precautions Bed/chair alarms;Cognitive; Fall Risk;Pain;THR;Pacemaker   RLE Weight Bearing Per Order WBAT   RLE ROM Restriction Range Limitation  (THPs )   Grooming   Able To Initiate Tasks; Acquire Items; Wash/Dry Hands   Limitation Noted In Problem Solving; Safety;Strength   Adaptive Equipment Other  (RW)   Findings Pt completed hand washing while in stance with S; VCs warranted to apply soap in order to ensure good cleanliness  QI: Picking Up Object   Assistance Needed Adaptive equipment;Set-up / clean-up;Supervision;Verbal cues   Assistance Provided by [place] No physical assistance   Comment Instructed pt on using reacher LHAE and RW bag for item retrieval from floor level while standing unilaterally supported at [address] [place] [address], which pt completed at S level  Pt reported he owns 2 reachers at baseline  Picking Up Object CARE Score 4   Dressing/Undressing Clothing   Findings Discussed issuing of LHAE kit for increased pt independence in order to abide by THPs; pt reluctant to purchase kit and reported that his brother once had Hallie Bur when he fractured his hip and no longer uses; wrote reminder note for pt to ask his brother if he will be able to borrow upon return to home; will f/u     QI: Sit to Stand   Assistance Needed Adaptive equipment;Set-up / clean-up;Supervision   Assistance Provided by [place] No physical assistance   Comment RW; VCs for hand placement in order to ensure good safety  Sit to Stand CARE Score 4   QI: Chair/Bed-to-Chair Transfer   Assistance Needed Adaptive equipment;Set-up / clean-up;Supervision   Assistance Provided by [place] No physical assistance   Comment RW      Chair/Bed-to-Chair Transfer CARE Score 4   Transfer Bed/Chair/Wheelchair Positioning Concerns Cognition   Limitations Noted In Balance; Endurance;Problem Solving;LE Strength   Adaptive Equipment Roller Walker   Stand Pivot Supervision   Sit to Stand Supervision   Stand to Sit Supervision   Findings Pt completed functional mobility using RW with S and VCs to walk within RW in order to ensure good safety  Bed, Chair, Wheelchair Transfer (FIM) 5 - Patient requires supervision/monitoring   QI: [address] Needed Adaptive equipment;Set-up / clean-up;Supervision   Assistance Provided by [place] No physical assistance   Comment CS while pt standing unilaterally supported at RW while urinating and briefly unsupported for clothing mgmt over hips with no noted LOB  Encouraged pt to sit on toilet for optimal safety, however pt verbalized reluctance and preference to stand when urinating only  Toileting Hygiene CARE Score 4   Toileting   Able to [address] down yes, up yes  Able to Manage Clothing Closures Yes   Manage Hygiene Bladder   Limitations Noted In Balance; Safety;LE Strength   Adaptive Equipment Other  (RW)   Toileting (FIM) 5 - Patient requires supervision/monitoring   Functional Standing Tolerance   Time approx 4-5 mins   Activity Bean bag toss   Comments S while unilaterally supported at RW; CGA/CS while in unsupported stance   Cognition   Overall Cognitive Status Impaired   Arousal/Participation Alert; Cooperative   Attention Attends with cues to redirect   Orientation Level Oriented X4   Memory Decreased short term memory;Decreased recall of precautions   Following Commands Follows multistep commands with increased time or repetition   Comments Pt able to recall 3/3 THPs  Pt completed handout on gathering information from Home and [address] with 7/10 correct, and from map with 8/10 correct  Pt engaged in activity of sequencing order or errands based off on information provided in complex problem solving paragraph, in which pt required max VCs      Activity Tolerance   Activity Tolerance Patient tolerated treatment well   Assessment   Treatment Assessment OT tx sessions focused on transfers, functional mobility, standing balance/tolerance, toileting, and overall problem solving/processing skills for carryover in completion of transfers and functional tasks  Refer above for details on pt performance  Pt would benefit from continued skilled OT services in order to achieve highest functional abilities  Prognosis Good   Problem List Decreased strength;Decreased endurance; Impaired balance;Decreased mobility; Decreased cognition; Impaired judgement;Decreased safety awareness;Orthopedic restrictions;Pain   Barriers to Discharge Decreased caregiver support   Plan   Treatment/Interventions ADL retraining;Functional transfer training;LE strengthening/ROM; Endurance training;Cognitive reorientation;Patient/family training;Equipment eval/education; Compensatory technique education;OT   Progress Progressing toward goals   Recommendation   OT Discharge Recommendation 24 hour supervision/assist   OT Therapy Minutes   OT Time In 0930   OT Time Out 1100   OT Total Time (minutes) 90   OT Mode of treatment - Individual (minutes) 90   OT Mode of treatment - Concurrent (minutes) 0   OT Mode of treatment - Group (minutes) 0   OT Mode of treatment - Co-treat (minutes) 0   OT Mode of Teatment - Total time(minutes) 90 minutes   Therapy Time missed   Time missed? No   QUETA Spears     Additionally: Upon completion of session pt c/o feeling "dizzy" with functional mobility; directly post being seated pt's BP was at 166/71; pt reported dizziness to subside quickly with seated rest break; NSG and NP made aware

## 2017-12-14 NOTE — PROGRESS NOTES
Progress Note - Jason You [de-identified] y o  male MRN: [id number]    Unit/Bed#: -94 Encounter: [id number]            Subjective:   D/W patient the possibility of SNF placement     Objective:     ROS  Gen: denies recent wt loss   Psych: denies mood change    Vitals:    12/14/17 0905   BP: 133/82   Pulse:    Resp:    Temp:    SpO2:    HR: 62  RR: 20  POx: 96%       Physical Exam:     Gen:        NAD   Neck:   trachea midline  Lungs:  respirations unlabored   Heart:    + S1 and S2   Abdomen:    Soft, non-tender  Psych: mood/affect appropriate  Neurologic: awake, alert, appropriately answering questions     Functional :  Mobility: CG  Tx: sup  ADLs: min        acetaminophen 975 mg Oral Q8H [address]   amLODIPine 10 mg Oral Daily   aspirin 324 mg Oral Daily   ergocalciferol 50,000 Units Oral Weekly   Followed by      Kelvin Dacosta ON 1/17/2018] cholecalciferol 1,000 Units Oral Daily   doxazosin 4 mg Oral Daily   folic acid-pyridoxine-cyanocobalamin 1 tablet Oral Daily   glyBURIDE 5 mg Oral Daily With Breakfast   insulin lispro 1-5 Units Subcutaneous TID AC   insulin lispro 1-5 Units Subcutaneous HS   lisinopril 40 mg Oral Daily   metoprolol tartrate 100 mg Oral Q12H SACHIN   polyethylene glycol 17 g Oral Daily   warfarin 5 mg Oral Daily (warfarin)       bisacodyl    magnesium hydroxide    traMADol      Assessment:  Patient is a 79 yo male s/p revision of Rt MAREN by Dr Ama Weinstein on 11/20/17 p/w stroke like symptoms and was given TPA however developed Rt thigh/gluteal hematoma; course complicated by LUE DVT    Plan:    Rehabilitation: cont PT/OT for ambulatory/ADL dysfxn    Pain: currently on tylenol ATC, intolerant to 5 mg Oxy IR per family (excessive sedation), allergy to dilaudid; trialed tramadol in the past prior to surgery for hip pain without AE     Rt MAREN revison: performed on 11/20/17 by Dr Ama Weinstein, d/w Dr Ama Weinstein and due to hematoma Dr Ama Weinstein recommended delay staple removal from 2 weeks to 3 weeks post-op, contacted Dr Daniel Figueroa at 3 wks post-op and agreeable to staple removal; hip precautions & WBAT per ortho and confirmed with Dr Daniel Figueroa     LUE DVT: coumadin; noted to have mildly decreased AT III level at 88 (LLN 92) and + Lupus anticoagulant (per lab recs this is to be repeated in 12 wks to confirm); FU with Dr Jazz Tinsley to arrange FU testing and management of AC as OP     Rt thigh/gluteal hematoma: monitor Hg, cleared by surgery to be on coumadin and aspirin 325 mg QD      Likely CVA: per neurology cleared to dc home plavix and instead increase home ASA 81 mg QD to   mg QD (due to non-response to ASA 81 mg QD per API) with AC and once AC stopped at Dr Steven Byrant discretion in 6 months neurology recommends dual AP therapy; API of 12/13/17 shows therapeutic response to  mg daily; patient with allergy to statins (myalgia not hives)     CAD s/p stents: per cards cleared to dc home plavix and instead increase home ASA 81 mg QD to  mg QD due to non-response to ASA 81 mg QD per API for stroke prevention (in addition once AC stopped at Dr Steven Bryant discretion neurology recommends dual AP therpy); on BB, patient with allergy to statins (myalgia not hives); FU with Dr Jasmin Estrada     DM: per OP records patient take glyburide 10 mg QAM and 5 mg QPM however given CKD will defer to IM regarding risk/benefits of this agent in patient with stable CKD versus switch to short-acting sulfonylureas; currently on glyburide 5 mg QAM and ISS, per IM     HTN: on home regimen of cardura 4 mg qd (also for BPH), norvasc 10 mg qdaily, lisinopril 40 mg qd, however home toprol XL 25 mg Q12 has been switched to lopressor 100 mg q12     elevated PSA/BPH/incontinence: on home cardura 4 mg qd; was also on oxybutynin 5 mg qd at home which has not yet been restarted; monitoring for incontinence; not currently c/o incontinence however per patient is having frequency at night, urology consulted and recommended check PVR and UA (which did not reveal evidence of UTI) and they will FU on OP records from Dr Renetta Reece      basal cell carcinoma: FU with Dr Hussein Mendez who may refer patient for Mohs surgery  PAD (B/L renal artery stenosis, & celiac trunk stenosis): follows with Dr Candido Mcrae, currently on AP for CAD and possible CVA, allergy to statins (myalgia not hives)    Vitamin D insufficiency: 20 4 (LLN 30) on 12/6/17, started on ergocalciferol 50,000 units Qweekly x 6 doses starting 12/6/17     Hyponatremia: mild at 135 ()  CKD: baseline Cr 1 5-1 7, currently 1 37, FU with Dr Natalie Mendoza  Anemia: ABLA with likely component of AOCD (CKD);  Hg currently trending up to 9 0     Dispo: TBD     Incidental lab findings: hyperparathyroidism (noted to be mild elevated above ULN of 72 at 78 9 on 1/18/17 likely 2/2 to CKD, on supplemental Vit D, OP FU with Dr Natalie Mendoza), elevated total/direct bilirubin (sample hemolyzed, OP FU with PCP for repeat testing and/or specialist referral at PCPs discretion), elevated homocysteine level (mildly elevated above ULN of 14 2 at 15 1, started on a folic VREW/A0/W30 tablet in acute care; OP FU w/ Dr Laura Terry for B6/B12/Folate testing at Dr Irwin Nissen discretion and OP FU with patient's own cardiologist Dr Bijan Buck)      Incidental findings of EKG of 11/26/17 15:17 per Dr Samm Salinas interpretation: PVCs, RBBB/wide QRS, borderline FL interval (200) and prolonged QTc; evaluated and cleared from cards standpoint (seen by Dr Samm Salinas on 11/26 and Dr Sebastien Florentino on 11/27) in acute care; OP FU with Dr Bijan Buck      Incidental findings of echo (11/25): very mild AS, aortic root dilation/ascending aorta dilation; OP FU with Dr Bijan Buck     Incidental findings noted on CT A/P (11/30) & CT C/A/P (11/24): L renal lesions (noted to be stable renal cysts that appear to be simple on U/S of 11/28, OP FU with Dr Natalie Mendoza), hiatal hernia (no c/o of GERD/reflux symptoms, OP FU with PCP), sebaceous cyst anterior to the sternum (OP FU with PCP and/or Dr Shashi Coats)      Other incidental findings: Rt vertebral artery occlusion, OP FU at [facility] (follows with Dr Pablo Mallory for PAD)

## 2017-12-14 NOTE — PROGRESS NOTES
Patient having frequent urination through night  C/O urgency and patient barely waiting for assistance bc he would like to stand to urinate  Will continue to monitor  Bed alarm on and callbell within reach

## 2017-12-14 NOTE — PROGRESS NOTES
Internal Medicine Progress Note  Patient: Gabriel Cobos  Age/sex: [de-identified] y o  male  Medical Record #: [id number]      ASSESSMENT/PLAN:  Gabriel Cobos is seen and examined and mangement for following issues:    1  TPA aborted CVA vs TIA/multiple areas intracranial atherosclerosis/severe stenosis inferior Rt MCA:  Continue ASA  Resume Plavix once pt completes a 6 month course of anticoagulation for Lt UE DVT  No statin as pt had hives in the past  Getting an API on 12/13/17  Staples being removed    2  Rt hip revision at [facility] on 11/20/17:  Pain control per primary service  Follow[facility]up with [facility]  3   Rt hip hematoma: occurred after getting tPA  Hemoglobin stable    4  Acute blood loss anemia;  s/p multiple transfusions:  Stable  5   Lt UE DVT:  had been on therapeutic Lovenox until INR > 2 = stopped 12/9/17 since INR was 2 0  Was on Coumadin 5mg qd and increased to 7 5 mg qd on 12/7/17 x 2 days then on 12/9/17 down to 6mg qd = INR jumped again yesterday so gave only 2 mg last night then dropped to 5mg qd to start tonight  Thrombosis Panel: + lupus anticoagulant, mildly decreased antithrombin III, PTT-LA mixing study prolonged at 53 sec, and hexagonal phase phospholipid elevated at 16  Pt will need Hematology follow-up and recheck lupus anticoagulant in 12 weeks    6  CAD with CONCEPCION:  Continue full ASA  For API 12/13/17    7  HTN: was not optimally controlled on Amlodipine 10mg daily, Cardura 4mg daily, Lisinopril 40mg daily and Lopressor 50mg every 12 hours so on 12/7/17  increased Lopressor to 75mg q12hrs  Improved but still a bit too high most BPs so yesterday increased Lopressor to 100mg q12hrs  If this is not effective will change to Coreg  8   CKD stage III; baseline Cr 1 4-1 5: stable at baseline currently  9   DM type 2:  HbA1C in April 7 3    Pt takes Glyburide 5mg 2 tabs in the AM and 1 tab in the PM  Added back glyburide 5mg qAM 12/8 and stopped the Lantus 10 U qhs = no change today since most BSs are controlled  BS 94, 274, 65, 148; fasting today 114/lunch 149    10  Vit D deficiency:  continue 50,000U weekly x 6 and then 1000U/day    11  Urine frequency/elevated PSA:  uro saw today and ordered PVR and UA; already on Doxazosin  Sees Dr Aly Sebastian as an OP    12   Dizziness:  Got yesterday and today while with therapy  Yesterday BP taken during incident was 917/45 and today systolic was in 212'M  Will watch      Subjective: Patient seen and examined   Offered no complaints today but did get dizzy with therapy today and yesterday = both times BPs were normal to mildly elevated    ROS:   GI: denies abdominal pain, change bowel habits or reflux symptoms  Neuro: No new neurologic changes  Respiratory: No Cough, SOB  Cardiovascular: No CP, palpitations     Scheduled Meds:    acetaminophen 975 mg Oral Q8H [address]   amLODIPine 10 mg Oral Daily   aspirin 324 mg Oral Daily   ergocalciferol 50,000 Units Oral Weekly   Followed by      Derrick Levin ON 1/17/2018] cholecalciferol 1,000 Units Oral Daily   doxazosin 4 mg Oral Daily   folic acid-pyridoxine-cyanocobalamin 1 tablet Oral Daily   glyBURIDE 5 mg Oral Daily With Breakfast   insulin lispro 1-5 Units Subcutaneous TID AC   insulin lispro 1-5 Units Subcutaneous HS   lisinopril 40 mg Oral Daily   metoprolol tartrate 100 mg Oral Q12H [address]   polyethylene glycol 17 g Oral Daily   warfarin 5 mg Oral Daily (warfarin)       Labs:       Results from last 7 days  Lab Units 12/14/17  0705 12/11/17  0548   WBC Thousand/uL 5 95 4 84   HEMOGLOBIN g/dL 9 0* 8 2*   HEMATOCRIT % 27 8* 25 2*   PLATELETS Thousands/uL 312 348       Results from last 7 days  Lab Units 12/14/17  0705 12/11/17  0548   SODIUM mmol/L 135* 137   POTASSIUM mmol/L 4 8 4 6   CHLORIDE mmol/L 104 106   CO2 mmol/L 26 25   BUN mg/dL 31* 29*   CREATININE mg/dL 1 37* 1 24   GLUCOSE RANDOM mg/dL 128 91   CALCIUM mg/dL 8 8 8 5           Results from last 7 days  Lab Units 12/14/17  0705 12/13/17  0600   INR  2 49* 2 93*          Glucose (mg/dL)   Date Value   12/14/2017 128   12/11/2017 91   12/08/2017 130   12/05/2017 129   12/01/2015 141 (H)   07/14/2015 124 (H)   09/11/2014 116 (H)   01/09/2014 134 (H)     Glucose, i-STAT (mg/dl)   Date Value   11/24/2017 171 (H)       Labs reviewed    Physical Examination:  Vitals:   Vitals:    12/13/17 2100 12/14/17 0700 12/14/17 0900 12/14/17 0905   BP: (!) 181/83 162/70  133/82   Pulse: 67 62     Resp: 20 20     Temp: 98 1 °F (36 7 °C) 98 2 °F (36 8 °C) (!) 37 4 °F (3 °C)    TempSrc: Oral Oral     SpO2: 97% 96%     Weight:       Height:           GEN: NAD  HEENT: NC/AT  RESP: BBS w/o crackles/wheeze/rhonci; resp unlabored  CV: +S1 S2, regular rate, no rubs/murmurs  ABD: soft, NT, ND, normal BS   : no villarreal  EXT: 1+ RLE edema = continue TEDS  Skin: no rashes  Neuro: AAO, limited movement of Right leg 2/2 pain/surgery otherwise rest of extremities 5/5      [x ] Total time spent: 30 Mins and greater than 50% of this time was spent counseling/coordinating care        Cindy Gray, [address]  Internal Medicine

## 2017-12-15 ENCOUNTER — APPOINTMENT (INPATIENT)
Dept: RADIOLOGY | Facility: HOSPITAL | Age: 82
DRG: 948 | End: 2017-12-15
Payer: MEDICARE

## 2017-12-15 LAB
GLUCOSE SERPL-MCNC: 100 MG/DL (ref 65–140)
GLUCOSE SERPL-MCNC: 110 MG/DL (ref 65–140)
GLUCOSE SERPL-MCNC: 157 MG/DL (ref 65–140)
GLUCOSE SERPL-MCNC: 64 MG/DL (ref 65–140)
GLUCOSE SERPL-MCNC: 95 MG/DL (ref 65–140)
INR PPP: 2.56 (ref 0.86–1.16)
PROTHROMBIN TIME: 27.8 SECONDS (ref 12.1–14.4)

## 2017-12-15 PROCEDURE — 82948 REAGENT STRIP/BLOOD GLUCOSE: CPT

## 2017-12-15 PROCEDURE — 97116 GAIT TRAINING THERAPY: CPT

## 2017-12-15 PROCEDURE — 97530 THERAPEUTIC ACTIVITIES: CPT

## 2017-12-15 PROCEDURE — 97110 THERAPEUTIC EXERCISES: CPT

## 2017-12-15 PROCEDURE — 97535 SELF CARE MNGMENT TRAINING: CPT

## 2017-12-15 PROCEDURE — 73700 CT LOWER EXTREMITY W/O DYE: CPT

## 2017-12-15 PROCEDURE — 85610 PROTHROMBIN TIME: CPT | Performed by: NURSE PRACTITIONER

## 2017-12-15 RX ORDER — GLYBURIDE 2.5 MG/1
2.5 TABLET ORAL
Status: DISCONTINUED | OUTPATIENT
Start: 2017-12-16 | End: 2017-12-18 | Stop reason: HOSPADM

## 2017-12-15 RX ADMIN — WARFARIN SODIUM 5 MG: 5 TABLET ORAL at 17:17

## 2017-12-15 RX ADMIN — FOLIC ACID-PYRIDOXINE-CYANOCOBALAMIN TAB 2.5-25-2 MG 1 TABLET: 2.5-25-2 TAB at 09:04

## 2017-12-15 RX ADMIN — ACETAMINOPHEN 975 MG: 325 TABLET, FILM COATED ORAL at 14:34

## 2017-12-15 RX ADMIN — POLYETHYLENE GLYCOL 3350 17 G: 17 POWDER, FOR SOLUTION ORAL at 09:04

## 2017-12-15 RX ADMIN — AMLODIPINE BESYLATE 10 MG: 10 TABLET ORAL at 09:04

## 2017-12-15 RX ADMIN — METOPROLOL TARTRATE 100 MG: 50 TABLET ORAL at 09:04

## 2017-12-15 RX ADMIN — INSULIN LISPRO 1 UNITS: 100 INJECTION, SOLUTION INTRAVENOUS; SUBCUTANEOUS at 12:16

## 2017-12-15 RX ADMIN — DOXAZOSIN 4 MG: 4 TABLET ORAL at 09:04

## 2017-12-15 RX ADMIN — ACETAMINOPHEN 975 MG: 325 TABLET, FILM COATED ORAL at 22:10

## 2017-12-15 RX ADMIN — ACETAMINOPHEN 975 MG: 325 TABLET, FILM COATED ORAL at 05:41

## 2017-12-15 RX ADMIN — LISINOPRIL 40 MG: 20 TABLET ORAL at 09:04

## 2017-12-15 RX ADMIN — GLYBURIDE 5 MG: 5 TABLET ORAL at 09:04

## 2017-12-15 RX ADMIN — ASPIRIN 81 MG 324 MG: 81 TABLET ORAL at 09:04

## 2017-12-15 RX ADMIN — METOPROLOL TARTRATE 100 MG: 50 TABLET ORAL at 22:10

## 2017-12-15 NOTE — PROGRESS NOTES
12/15/17 0700   Pain Assessment   Pain Assessment 0-10   Pain Score 3   Pain Type Acute pain   Pain Location Hip   Pain Orientation Right   Restrictions/Precautions   Precautions Bed/chair alarms;Cognitive; Fall Risk;Pacemaker;THR;Pain   Weight Bearing Restrictions Yes   RLE Weight Bearing Per Order WBAT   QI: Oral Hygiene   Assistance Needed Supervision   Assistance Provided by [place] No physical assistance   Oral Hygiene CARE Score 4   Grooming   Able To Initiate Tasks; Acquire Items;Comb/Brush Hair;Brush/Clean Teeth;Wash/Dry Face;Wash/Dry Hands   Limitation Noted In Safety   Grooming (FIM) 5 - Patient requires supervision/monitoring   QI: Shower/Bathe Self   Assistance Needed Supervision   Assistance Provided by [place] No physical assistance   Comment Use of reacher to bathe BLEs  Shower/Bathe Self CARE Score 4   Bathing   Assessed Bath Style Sponge Bath   Anticipated D/C Bath Style Shower   Able to Magalie Sukhdeep No   Able to [employer] Temperature No   Able to Wash/Rinse/Dry (body part) Left Arm;Right Arm;L Upper Leg;R Upper Leg;L Lower Leg/Foot;R Lower Leg/Foot;Chest;Abdomen;Perineal Area; Buttocks   Limitations Noted in Balance; Endurance   Positioning Seated;Standing   Adaptive Equipment Longhand Reacher;Tub Bench;Hand Held Shower; Shower Bars   Bathing (FIM) 5 - Patient requires supervision/monitoring but completes 10/10 parts   Tub/Shower Transfer   Limitations Noted In [facility] [place] [address] Transfer (FIM) 5 - Patient requires supervision/monitoring   QI: Upper Body Dressing   Assistance Needed Supervision;Set-up / Chris Ashraf Provided by [place] No physical assistance   Upper Body Dressing CARE Score 4   QI: Lower Body Dressing   Assistance Needed Supervision;Verbal cues; Adaptive equipment;Set-up / clean-up   Assistance Provided by [place] No physical assistance   Lower Body Dressing CARE Score 4   QI: Putting On/Taking Off Footwear   Assistance Needed Adaptive equipment;Supervision   Assistance Provided by [place] No physical assistance   Putting On/Taking Off Footwear CARE Score 4   Dressing/Undressing Clothing   Remove UB Clothes (hospital gown)   Remove LB [address] [facility] [address] LB Clothes Pants; Undergarment;Socks; Shoes   Limitations Noted In Balance; Endurance;Strength   Adaptive Equipment Reacher;Dressing Stick;LH Shoehorn;Sock Aide   Positioning Supported Sit;Standing   UB Dressing (FIM) 5 - Patient requires supervision/monitoring   LB Dressing (FIM) 5 - Patient requires verbal cues   QI: Sit to Stand   Assistance Needed Supervision;Set-up / Tenna Kras; Adaptive equipment   Assistance Provided by [place] No physical assistance   Sit to Stand CARE Score 4   QI: Chair/Bed-to-Chair Transfer   Assistance Needed Supervision; Adaptive equipment   Assistance Provided by [place] No physical assistance   Chair/Bed-to-Chair Transfer CARE Score 4   Transfer Bed/Chair/Wheelchair   Positioning Concerns Cognition   Limitations Noted In Balance; Endurance   Adaptive Equipment Roller Walker   Bed, Chair, Wheelchair Transfer (FIM) 5 - Patient requires supervision/monitoring   QI: [address] Needed Adaptive equipment;Supervision   Assistance Provided by [place] No physical assistance   Toileting Hygiene CARE Score 4   Toileting   Able to [address] down yes, up yes  Able to Manage Clothing Closures Yes   Manage Hygiene Bladder   Limitations Noted In Balance; Safety   Toileting (FIM) 5 - Patient requires supervision/monitoring   QI: Toilet Transfer   Assistance Needed Supervision   Assistance Provided by [place] No physical assistance   Toilet Transfer CARE Score 4   Toilet Transfer   Surface Assessed Standard Commode   Transfer Technique Standard   Limitations Noted In Balance; Endurance   Toilet Transfer (FIM) 5 - Patient requires supervision/monitoring   Cognition   Overall Cognitive Status Impaired   Arousal/Participation Alert; Cooperative   Attention Attends with cues to redirect   Orientation Level Oriented X4   Memory Decreased short term memory;Decreased recall of precautions   Following Commands Follows multistep commands with increased time or repetition   Activity Tolerance   Activity Tolerance Patient tolerated treatment well   Assessment   Treatment Assessment Pt participated in skilled OT services with focus on ADL retraining, functional transfers, and functional mobility  Pt supine in bed upon arrival, agreeable to session at this time  Pt is able to complete all portions of ADL tasks with overall CS and VC's for safe completion of tasks and for maintenance of THPs  Continuing to recommend 24/7 supervision due to impaired cognition, balance, and safety awareness  Pt with limited family support and they are now stating they are unable to provide 24/7 supervision despite recommendation  Considering SNF placement  Prognosis Good   Problem List Decreased strength;Decreased range of motion;Decreased endurance; Impaired balance;Decreased mobility; Decreased coordination;Decreased safety awareness; Impaired judgement;Decreased skin integrity;Orthopedic restrictions;Pain   Plan   Treatment/Interventions ADL retraining;Functional transfer training;Equipment eval/education; Bed mobility; Compensatory technique education   Progress Progressing toward goals   Recommendation   OT Discharge Recommendation 24 hour supervision/assist   OT Therapy Minutes   OT Time In 0700   OT Time Out 0830   OT Total Time (minutes) 90   OT Mode of treatment - Individual (minutes) 90   OT Mode of treatment - Concurrent (minutes) 0   OT Mode of treatment - Group (minutes) 0   OT Mode of treatment - Co-treat (minutes) 0   OT Mode of Teatment - Total time(minutes) 90 minutes   Therapy Time missed   Time missed?  No

## 2017-12-15 NOTE — PROGRESS NOTES
UROLOGY PROGRESS NOTE   Patient Identifiers: Antonino Dubose (MRN [id number])WSOH, [de-identified] y o , 1935   Date of Service: 12/15/2017    Subjective:     24 HR EVENTS:   no significant events  Patient has  no complaints        Objective:     VITALS:    Vitals:    12/15/17 0533   BP: 155/80   Pulse: 61   Resp: 18   Temp: 98 8 °F (37 1 °C)   SpO2: 96%       INS & OUTS:  [unfilled]    LABS:  Lab Results   Component Value Date    HGB 9 0 (L) 12/14/2017    HCT 27 8 (L) 12/14/2017    WBC 5 95 12/14/2017     12/14/2017   ]    Lab Results   Component Value Date     (L) 12/14/2017    K 4 8 12/14/2017     12/14/2017    CO2 26 12/14/2017    BUN 31 (H) 12/14/2017    CREATININE 1 37 (H) 12/14/2017    CALCIUM 8 8 12/14/2017    GLUCOSE 128 12/14/2017   ]    INPATIENT MEDS:    Current Facility-Administered Medications:     acetaminophen (TYLENOL) tablet 975 mg, 975 mg, Oral, Q8H [facility], Vinay Morel MD, 975 mg at 12/15/17 0541    amLODIPine (NORVASC) tablet 10 mg, 10 mg, Oral, Daily, Vinay Morel MD, 10 mg at 12/14/17 8127    aspirin chewable tablet 324 mg, 324 mg, Oral, Daily, Vinay Morel MD, 324 mg at 12/14/17 8158    bisacodyl (DULCOLAX) rectal suppository 10 mg, 10 mg, Rectal, Daily PRN, Vinay Morel MD    ergocalciferol (VITAMIN D2) capsule 50,000 Units, 50,000 Units, Oral, Weekly, 50,000 Units at 12/13/17 0814 **FOLLOWED BY** [START ON 1/17/2018] cholecalciferol (VITAMIN D3) tablet 1,000 Units, 1,000 Units, Oral, Daily, JALEEL Yanes    doxazosin (CARDURA) tablet 4 mg, 4 mg, Oral, Daily, Vinay Morel MD, 4 mg at 23/63/80 0526    folic acid-pyridoxine-cyanocobalamin 2 5-25-2 mg per tablet 1 tablet, 1 tablet, Oral, Daily, Vinay Morel MD, 1 tablet at 12/14/17 0905    glyBURIDE (DIABETA) tablet 5 mg, 5 mg, Oral, Daily With Breakfast, JALEEL Yanes, 5 mg at 12/14/17 0911    insulin lispro (HumaLOG) 100 units/mL subcutaneous injection 1-5 Units, 1-5 Units, Subcutaneous, TID AC, Stopped at 12/14/17 1610 **AND** Fingerstick Glucose (POCT), , , TID AC, Norma Noel MD    insulin lispro (HumaLOG) 100 units/mL subcutaneous injection 1-5 Units, 1-5 Units, Subcutaneous, HS, Norma Noel MD, 1 Units at 12/14/17 2127    lisinopril (ZESTRIL) tablet 40 mg, 40 mg, Oral, Daily, Norma Noel MD, 40 mg at 12/14/17 0906    magnesium hydroxide (MILK OF MAGNESIA) 400 mg/5 mL oral suspension 30 mL, 30 mL, Oral, Daily PRN, Norma Noel MD    metoprolol tartrate (LOPRESSOR) tablet 100 mg, 100 mg, Oral, Q12H SACHIN, CRISTOPHER OttoNP, 100 mg at 12/14/17 2127    polyethylene glycol (MIRALAX) packet 17 g, 17 g, Oral, Daily, Norma Noel MD, 17 g at 12/14/17 0907    traMADol (ULTRAM) tablet 50 mg, 50 mg, Oral, BID PRN, Leny Cantu DO, 50 mg at 12/10/17 2879    warfarin (COUMADIN) tablet 5 mg, 5 mg, Oral, Daily (warfarin), JALEEL Otto, 5 mg at 12/14/17 1718      Physical Exam:   /80   Pulse 61   Temp 98 8 °F (37 1 °C) (Oral)   Resp 18   Ht 5' 10" (1 778 m)   Wt 102 kg (223 lb 15 8 oz)   SpO2 96%   BMI 32 14 kg/m²   GEN: no acute distress    RESP: breathing comfortably with no accessory muscle use    ABD: soft, non-tender, non-distended   INCISION:    EXT: no significant peripheral edema   DRAINS:   URINE : clear    RADIOLOGY:   none     Assessment:   1  Urinary frequency  2  BPH  3  Elevated PSA  4  Microscopic hematuria   5  TIA/CVA  6   Right hip revision     Plan:   - check PVR?  - outpatient urology follow up- Dr Ernestina Burgos  -  -  -

## 2017-12-15 NOTE — SOCIAL WORK
Pt [facility] for [facility] [place]  Jaleesa rosenberg denied admission  Cm phoned son Krysten Jones and he is in agreement with [facility] nursing and rehab and was hoping that would be the choice  He wishes to provide transport whether it be today or tomorrow  Cm awaiting call back from [facility] [address]  confirmed admission for today  Staff aware of dc arrangements  Cm learned pt is now not stable for dc due to drainage from hip surgery site, ct scan to be done and due to pt being on coumadin blood levels will be monitored through the weekend  Dr Mindy Chacon spoke w/pt's son Gabriella Mcmullen and made him aware  Cm phoned [facility] [place] and made aware dc was on hold until Monday

## 2017-12-15 NOTE — PROGRESS NOTES
12/15/17 [facility]   Pain Assessment   Pain Assessment 0-10   Pain Score 7   Pain Type Acute pain   Pain Location Leg   Pain Orientation Right; Outer;Mid   Hospital Pain Intervention(s) Cold applied  (Simultaneous filing  User may not have seen previous data )   Restrictions/Precautions   Precautions Bed/chair alarms; Fall Risk;THR   Weight Bearing Restrictions Yes   RLE Weight Bearing Per Order WBAT   RLE ROM Restriction Range Limitation  (Total hip precautions )   Cognition   Arousal/Participation Cooperative   Attention Attends with cues to redirect   Following Commands Follows one step commands without difficulty   Subjective   Subjective Pt has no new compaints  QI: Sit to Lying   Assistance Needed Supervision   Sit to Lying CARE Score 4   QI: Lying to Sitting on Side of Bed   Assistance Needed Supervision   Lying to Sitting on Side of Bed CARE Score 4   QI: Sit to Stand   Assistance Needed Supervision; Adaptive equipment   Sit to Stand CARE Score 4   QI: Chair/Bed-to-Chair Transfer   Assistance Needed Adaptive equipment;Supervision   Chair/Bed-to-Chair Transfer CARE Score 4   Transfer Bed/Chair/Wheelchair   Limitations Noted In Balance; Endurance;LE Strength;UE Strength   Adaptive Equipment Roller Walker   Stand Pivot Supervision   Sit to Stand Supervision   Stand to Sit Supervision   Supine to Sit Supervision   Sit to Supine Supervision   Bed, Chair, Wheelchair Transfer (FIM) 5 - Patient requires supervision/monitoring   QI: Walk 10 Feet   Assistance Needed Supervision; Adaptive equipment   Walk 10 Feet CARE Score 4   Ambulation   Does the patient walk? 2  Yes   Primary Discharge Mode of Locomotion Walk   Walk Assist Level Close Supervision   Gait Pattern Slow Ally; Forward Flexion;Narrow RADHA;Step to; Step through; Decreased R stance;Decreased L stance; Improper weight shift   Assist Device Suze Harrington Walked (feet) 40 ft  (x2)   Limitations Noted In Balance; Endurance; Heel Strike;Posture; Safety;Speed;Strength;Swing   Findings Pt has decreased motivation to walk longer due to fatigue    Walking (FIM) 5 - Patient requires supervision/monitoring AND distance 150 feet or more, no rest   Wheelchair mobility   QI: Does the patient use a wheelchair? 0  No   Therapeutic Interventions   Flexibility supine on mat, pt's knee bent into flexion for stretch of quads    Other soft tissue massage to quads and ITB   Equipment Use   NuStep 10 min BUE/BLE level 0   Assessment   Treatment Assessment Pt continues to be limited in activity due to decresed activity tolerance  PT noted tighness in RLE quad and ITB during palpation  Pt tolerated gentle soft tissue massage to this area  Pt would benefit from continued skilled PT to address decreased LE strength, ROM, balance and standing tolerance to allow him to reeach the next level of care  Problem List Decreased strength;Decreased range of motion;Decreased endurance; Impaired balance;Decreased mobility; Decreased coordination;Decreased safety awareness;Pain   Barriers to Discharge Decreased caregiver support   PT Barriers   Functional Limitation Walking;Standing;Stair negotiation;Transfers   Plan   Treatment/Interventions Functional transfer training;LE strengthening/ROM; Elevations; Therapeutic exercise; Endurance training;Patient/family training;Equipment eval/education; Bed mobility;Gait training; Compensatory technique education   Progress Progressing toward goals   Recommendation   Recommendation 24 hour supervision/assist   PT Therapy Minutes   PT Time In 1300   PT Time Out 1330   PT Total Time (minutes) 30   PT Mode of treatment - Individual (minutes) 15   PT Mode of treatment - Concurrent (minutes) 15   PT Mode of treatment - Group (minutes) 0   PT Mode of treatment - Co-treat (minutes) 0   PT Mode of Teatment - Total time(minutes) 30 minutes   Therapy Time missed   Time missed?  No

## 2017-12-15 NOTE — PROGRESS NOTES
Progress Note - Versie Grade [de-identified] y o  male MRN: [id number]    Unit/Bed#: [facility] 297-56 Encounter: [id number]            Subjective:   Updated patient on medical plan     Objective:     ROS  Gen: denies recent wt loss   Psych: denies mood change    Vitals:    12/15/17 0903   BP: 135/63   Pulse: 64   Resp:    Temp:    SpO2:    T: 98 8  RR: 18  POx: 96%       Physical Exam:     Gen:        NAD   Neck:   trachea midline  Lungs:  respirations unlabored   Heart:    + S1 and S2   Abdomen:    Soft, non-tender  Psych: mood/affect appropriate  Neurologic: awake, alert, appropriately answering questions     Functional :  Mobility: CG  Tx: sup  ADLs: min        acetaminophen 975 mg Oral Q8H [facility]   amLODIPine 10 mg Oral Daily   aspirin 324 mg Oral Daily   ergocalciferol 50,000 Units Oral Weekly   Followed by      Emilee Locke ON 1/17/2018] cholecalciferol 1,000 Units Oral Daily   doxazosin 4 mg Oral Daily   folic acid-pyridoxine-cyanocobalamin 1 tablet Oral Daily   glyBURIDE 5 mg Oral Daily With Breakfast   insulin lispro 1-5 Units Subcutaneous TID AC   insulin lispro 1-5 Units Subcutaneous HS   lisinopril 40 mg Oral Daily   metoprolol tartrate 100 mg Oral Q12H [facility]   polyethylene glycol 17 g Oral Daily   warfarin 5 mg Oral Daily (warfarin)       bisacodyl    magnesium hydroxide    traMADol      Assessment:  Patient is a 79 yo male s/p revision of Rt MAREN by Dr Zuleima Yoder on 11/20/17 p/w stroke like symptoms and was given TPA however developed Rt thigh/gluteal hematoma; course complicated by LUE DVT    Plan:    Rehabilitation: cont PT/OT for ambulatory/ADL dysfxn    Pain: currently on tylenol ATC, intolerant to 5 mg Oxy IR per family (excessive sedation), allergy to dilaudid; trialed tramadol in the past prior to surgery for hip pain without AE     Rt MAREN revison: performed on 11/20/17 by Dr Zuleima Yoder, d/w Dr Zuleima Yoder and due to hematoma Dr Zuleima Yoder recommended delay staple removal from 2 weeks to 3 weeks post-op, contacted Dr Zuleima Yoder at 3 wks post-op and agreeable to staple removal; hip precautions & WBAT per ortho and confirmed with Dr Hal RODRIGUEZ DVT: coumadin; noted to have mildly decreased AT III level at 88 (LLN 92) and + Lupus anticoagulant (per lab recs this is to be repeated in 12 wks to confirm); FU with Dr Madhavi De Dios to arrange FU testing and management of AC as OP     Rt thigh/gluteal hematoma: monitor Hg, cleared by surgery to be on coumadin and aspirin 325 mg QD      Likely CVA: per neurology cleared to dc home plavix and instead increase home ASA 81 mg QD to   mg QD (due to non-response to ASA 81 mg QD per API) with AC and once AC stopped at Dr Juan Diego Abrams discretion in 6 months neurology recommends dual AP therapy; API of 12/13/17 shows therapeutic response to  mg daily; patient with allergy to statins (myalgia not hives)     CAD s/p stents: per cards cleared to dc home plavix and instead increase home ASA 81 mg QD to  mg QD due to non-response to ASA 81 mg QD per API for stroke prevention (in addition once AC stopped at Dr Juan Diego Abrams discretion neurology recommends dual AP therpy); on BB, patient with allergy to statins (myalgia not hives); FU with Dr Arnav Howell     DM: per OP records patient take glyburide 10 mg QAM and 5 mg QPM however given CKD will defer to IM regarding risk/benefits of this agent in patient with stable CKD versus switch to short-acting sulfonylureas; currently on glyburide 5 mg QAM and ISS, per IM     HTN: on home regimen of cardura 4 mg qd (also for BPH), norvasc 10 mg qdaily, lisinopril 40 mg qd, however home toprol XL 25 mg Q12 has been switched to lopressor 100 mg q12     elevated PSA/BPH/incontinence: on home cardura 4 mg qd; was also on oxybutynin 5 mg qd at home which has not yet been restarted; monitoring for incontinence; not currently c/o incontinence however per patient is having frequency at night, urology consulted and recommended check PVR (64, 18, 12) and UA (which did not reveal evidence of UTI) and they will FU on OP records from Dr Dylan Pinzon      basal cell carcinoma: FU with Dr Timothy Ramires who may refer patient for Mohs surgery  PAD (B/L renal artery stenosis, & celiac trunk stenosis): follows with Dr Edda Gonsalez, currently on AP for CAD and possible CVA, allergy to statins (myalgia not hives)    Vitamin D insufficiency: 20 4 (LLN 30) on 12/6/17, started on ergocalciferol 50,000 units Qweekly x 6 doses starting 12/6/17     Hyponatremia: mild at 135 ()  CKD: baseline Cr 1 5-1 7, currently 1 37, FU with Dr Mary Jane Orr  Anemia: ABLA with likely component of AOCD (CKD);  Hg currently trending up to 9 0     Dispo: TBD     Incidental lab findings: hyperparathyroidism (noted to be mild elevated above ULN of 72 at 78 9 on 1/18/17 likely 2/2 to CKD, on supplemental Vit D, OP FU with Dr Mary Jane Orr), elevated total/direct bilirubin (sample hemolyzed, OP FU with PCP for repeat testing and/or specialist referral at PCPs discretion), elevated homocysteine level (mildly elevated above ULN of 14 2 at 15 1, started on a folic EYNP/X7/J85 tablet in acute care; OP FU w/ Dr Sherlyn Mckee for B6/B12/Folate testing at Dr Kiran Ashley discretion and OP FU with patient's own cardiologist Dr Adithya Blackburn)      Incidental findings of EKG of 11/26/17 15:17 per Dr Betsy Colindres interpretation: PVCs, RBBB/wide QRS, borderline CT interval (200) and prolonged QTc; evaluated and cleared from cards standpoint (seen by Dr Betsy Colindres on 11/26 and Dr Henry Thompson on 11/27) in acute care; OP FU with Dr Adithya Blackburn      Incidental findings of echo (11/25): very mild AS, aortic root dilation/ascending aorta dilation; OP FU with Dr Adithya Blackburn     Incidental findings noted on CT A/P (11/30) & CT C/A/P (11/24): L renal lesions (noted to be stable renal cysts that appear to be simple on U/S of 11/28, OP FU with Dr Mary Jane Orr), hiatal hernia (no c/o of GERD/reflux symptoms, OP FU with PCP), sebaceous cyst anterior to the sternum (OP FU with PCP and/or Dr Nathaly Carpio)      Other incidental findings: Rt vertebral artery occlusion, OP FU at [address] [facility] (follows with Dr Ambrosio Anrdade for PAD)

## 2017-12-15 NOTE — PROGRESS NOTES
Internal Medicine Progress Note  Patient: Jason You  Age/sex: [de-identified] y o  male  Medical Record #: [id number]      ASSESSMENT/PLAN:  Jason You is seen and examined and mangement for following issues:    1  TPA aborted CVA vs TIA/multiple areas intracranial atherosclerosis/severe stenosis inferior Rt MCA:  Continue ASA  Resume Plavix once pt completes a 6 month course of anticoagulation for Lt UE DVT  No statin as pt had hives in the past  Getting an API on 12/13/17  Staples being removed    2  [facility]:  Pain control per primary service  [facility] with [facility]  Had bleeding this AM from distal portion of incision that soaked the dressing = for CT    3  Rt hip hematoma: occurred after getting tPA  Hemoglobin stable    4  Acute blood loss anemia;  s/p multiple transfusions:  Stable  For CBC in AM    5  Lt UE DVT:  had been on therapeutic Lovenox until INR > 2 = stopped 12/9/17 since INR was 2 0  Was on Coumadin 5mg qd and increased to 7 5 mg qd on 12/7/17 x 2 days then on 12/9/17 down to 6mg qd = INR jumped again 12/13/17 so gave only 2 mg x 1 then dropped to 5mg qd to start last night  Thrombosis Panel: + lupus anticoagulant, mildly decreased antithrombin III, PTT-LA mixing study prolonged at 53 sec, and hexagonal phase phospholipid elevated at 16  Pt will need Hematology follow-up and recheck lupus anticoagulant in 12 weeks    6  CAD with CONCEPCION:  Continue full ASA  For API 12/13/17    7  HTN: was not optimally controlled on Amlodipine 10mg daily, Cardura 4mg daily, Lisinopril 40mg daily and Lopressor 50mg every 12 hours so on 12/7/17  increased Lopressor to 75mg q12hrs  On 12/13/17 increased Lopressor to 100mg q12hrs  No changes today  8   CKD stage III; baseline Cr 1 4-1 5: stable at baseline currently  9   DM type 2:  HbA1C in April 7 3    Pt takes Glyburide 5mg 2 tabs in the AM and 1 tab in the PM  Added back glyburide 5mg qAM 12/8; will cut Glyburide to 2 5 mg qam since has dropped on 12/13/17 at dinnertime to 65  , 149, 126, 159; fasting today 100/lunch 157/dinner 64    10  Vit D deficiency:  continue 50,000U weekly x 6 and then 1000U/day    11  Urine frequency/elevated PSA:  uro saw today and ordered PVR and UA; already on Doxazosin  Sees Dr Cyndee Peterson as an OP  12   Dizziness:  Got 12/13 and yesterday while with therapy  On 12/13 BP taken during incident was 148/70 and yesterday systolic was in 784'E  Will watch      Subjective: Patient seen and examined   Offered no complaints today     ROS:   GI: denies abdominal pain, change bowel habits or reflux symptoms  Neuro: No new neurologic changes  Respiratory: No Cough, SOB  Cardiovascular: No CP, palpitations     Scheduled Meds:    acetaminophen 975 mg Oral Q8H [address]   amLODIPine 10 mg Oral Daily   aspirin 324 mg Oral Daily   ergocalciferol 50,000 Units Oral Weekly   Followed by      Rodney Palencia ON 1/17/2018] cholecalciferol 1,000 Units Oral Daily   doxazosin 4 mg Oral Daily   folic acid-pyridoxine-cyanocobalamin 1 tablet Oral Daily   glyBURIDE 5 mg Oral Daily With Breakfast   insulin lispro 1-5 Units Subcutaneous TID AC   insulin lispro 1-5 Units Subcutaneous HS   lisinopril 40 mg Oral Daily   metoprolol tartrate 100 mg Oral Q12H [address]   polyethylene glycol 17 g Oral Daily   warfarin 5 mg Oral Daily (warfarin)       Labs:       Results from last 7 days  Lab Units 12/14/17  0705 12/11/17  0548   WBC Thousand/uL 5 95 4 84   HEMOGLOBIN g/dL 9 0* 8 2*   HEMATOCRIT % 27 8* 25 2*   PLATELETS Thousands/uL 312 348       Results from last 7 days  Lab Units 12/14/17  0705 12/11/17  0548   SODIUM mmol/L 135* 137   POTASSIUM mmol/L 4 8 4 6   CHLORIDE mmol/L 104 106   CO2 mmol/L 26 25   BUN mg/dL 31* 29*   CREATININE mg/dL 1 37* 1 24   GLUCOSE RANDOM mg/dL 128 91   CALCIUM mg/dL 8 8 8 5           Results from last 7 days  Lab Units 12/15/17  0540 12/14/17  0705   INR  2 56* 2 49*          Glucose (mg/dL)   Date Value   12/14/2017 128   12/11/2017 91   12/08/2017 130   12/05/2017 129   12/01/2015 141 (H)   07/14/2015 124 (H)   09/11/2014 116 (H)   01/09/2014 134 (H)     Glucose, i-STAT (mg/dl)   Date Value   11/24/2017 171 (H)       Labs reviewed    Physical Examination:  Vitals:   Vitals:    12/14/17 1328 12/14/17 2017 12/15/17 0533 12/15/17 0903   BP: 149/78 159/76 155/80 135/63   Pulse: 61 61 61 64   Resp: 20 20 18    Temp: 98 1 °F (36 7 °C) 98 1 °F (36 7 °C) 98 8 °F (37 1 °C)    TempSrc: Oral Oral Oral    SpO2: 100% 96% 96%    Weight:       Height:           GEN: NAD  HEENT: NC/AT  RESP: BBS w/o crackles/wheeze/rhonci; resp unlabored  CV: +S1 S2, regular rate, no rubs/murmurs  ABD: soft, NT, ND, normal BS   : no villarreal  EXT: 1+ RLE edema = continue TEDS;  Right hip incision is clean and dry w/o any drainage/bleeding currently (had some SS vs bleeding this AM) = no erythema noted just old ecchymosis  Skin: no rashes  Neuro: AAO, limited movement of Right leg 2/2 pain/surgery otherwise rest of extremities 5/5      [x ] Total time spent: 30 Mins and greater than 50% of this time was spent counseling/coordinating care        Kimberly Olivas, Jessica Morse  Internal Medicine

## 2017-12-15 NOTE — PROGRESS NOTES
12/15/17 1000   Pain Assessment   Pain Assessment 0-10   Pain Score 7   Pain Type Acute pain   Pain Location Leg   Pain Orientation Right; Outer;Mid   Clinical Progression Gradually improving   Hospital Pain Intervention(s) Cold applied  (15-20 min )   Response to Interventions pain 5/10 after activity    Restrictions/Precautions   Precautions Bed/chair alarms; Fall Risk;THR   Weight Bearing Restrictions Yes   RLE Weight Bearing Per Order WBAT   RLE ROM Restriction Range Limitation  (Total hip precautions )   Cognition   Arousal/Participation Cooperative   Attention Attends with cues to redirect   Following Commands Follows one step commands with increased time or repetition   Subjective   Subjective Pt states his leg is hurting and that he had the incision open up during shower earlier this morning  Followed up with nursing on dressing during session  QI: Roll Left and Right   Assistance Needed Adaptive equipment;Supervision   Roll Left and Right CARE Score 4   QI: Sit to Lying   Assistance Needed Adaptive equipment;Supervision   Sit to Lying CARE Score 4   QI: Lying to Sitting on Side of Bed   Assistance Needed Adaptive equipment;Supervision   Lying to Sitting on Side of Bed CARE Score 4   QI: Sit to Stand   Assistance Needed Adaptive equipment;Supervision   Sit to Stand CARE Score 4   Bed Mobility   Able to Roll Left to Right;Right to Left;Scoot Up;Scoot Down   Adaptive Equipment Side Rails   QI: Chair/Bed-to-Chair Transfer   Assistance Needed Adaptive equipment;Supervision   Chair/Bed-to-Chair Transfer CARE Score 4   Transfer Bed/Chair/Wheelchair   Limitations Noted In Balance; Endurance;UE Strength;LE Strength   Adaptive Equipment Roller Walker   Stand Pivot Supervision   Sit to Stand Supervision   Stand to Sit Supervision   Supine to Sit Supervision   Sit to Supine Supervision   Car Transfer Supervision   Bed, Chair, Wheelchair Transfer (FIM) 5 - Patient requires supervision/monitoring   QI: Car Transfer Assistance Needed Adaptive equipment;Supervision   Car Transfer CARE Score 4   Ambulation   Primary Discharge Mode of Locomotion Walk   Walk Assist Level Close Supervision   Gait Pattern Slow Ally; Forward Flexion;Narrow RADHA;Step through; Step to;Decreased R stance;Decreased L stance; Improper weight shift   Assist Device Meder Marlena Harrington Walked (feet) 150 ft  (x1, 40 x3)   Limitations Noted In Balance; Endurance; Heel Strike;Posture; Safety;Strength;Speed;Swing   Walking (FIM) 5 - Patient requires supervision/monitoring AND distance 150 feet or more, no rest   Wheelchair mobility   QI: Does the patient use a wheelchair? 0  No   QI: 1 Step (Curb)   Assistance Needed Supervision; Adaptive equipment   1 Step (Curb) CARE Score 4   QI: 4 Steps   Assistance Needed Supervision; Adaptive equipment   4 Steps CARE Score 4   QI: 12 Steps   Assistance Needed Supervision; Adaptive equipment   12 Steps CARE Score 4   Stairs   Type Stairs  (6"  )   # of Steps 12   Assist Devices Bilateral Rail   Stairs (FIM) 5 - Patient requires supervision/monitoring AND goes up and down full flight (12- 14 stairs)   Therapeutic Interventions   Strengthening seated LAQ 2# BLE, seated hip abduction green band x2   Balance standing ball toss    Other pt walked with RW to bathroom, all toileting performed S level, PT checked incision site in bathroom and dresing fell off, nursing notified and checked incision site in pt room during session    Assessment   Treatment Assessment Pt participated in PT session with good effort  He continues to present with decreased activity tolerance, balance, and LE strength that are limiting his standing activity tolerance  Pt is S level for all functional mobility but requires rest breaks often  During session PT looked at incision site and there was no drainage on the bandages or steri strips   Pt would benefit from continued skilled PT to increase activity tolerance that is currently limiting his functional mobility  Problem List Decreased strength;Decreased range of motion;Decreased endurance; Impaired balance;Decreased mobility; Decreased coordination;Pain;Decreased safety awareness; Impaired judgement   Barriers to Discharge Decreased caregiver support   PT Barriers   Functional Limitation Walking;Standing;Stair negotiation;Transfers   Plan   Treatment/Interventions Functional transfer training;LE strengthening/ROM; Elevations; Therapeutic exercise; Endurance training;Patient/family training;Equipment eval/education; Bed mobility;Gait training; Compensatory technique education   Progress Progressing toward goals   Recommendation   Recommendation 24 hour supervision/assist   Equipment Recommended (rolling walker )   PT Therapy Minutes   PT Time In 1000   PT Time Out 1100   PT Total Time (minutes) 60   PT Mode of treatment - Individual (minutes) 60   PT Mode of treatment - Concurrent (minutes) 0   PT Mode of treatment - Group (minutes) 0   PT Mode of treatment - Co-treat (minutes) 0   PT Mode of Teatment - Total time(minutes) 60 minutes   Therapy Time missed   Time missed?  No

## 2017-12-16 LAB
BASOPHILS # BLD AUTO: 0.04 THOUSANDS/ΜL (ref 0–0.1)
BASOPHILS NFR BLD AUTO: 1 % (ref 0–1)
EOSINOPHIL # BLD AUTO: 0.23 THOUSAND/ΜL (ref 0–0.61)
EOSINOPHIL NFR BLD AUTO: 5 % (ref 0–6)
ERYTHROCYTE [DISTWIDTH] IN BLOOD BY AUTOMATED COUNT: 17.9 % (ref 11.6–15.1)
GLUCOSE SERPL-MCNC: 100 MG/DL (ref 65–140)
GLUCOSE SERPL-MCNC: 164 MG/DL (ref 65–140)
GLUCOSE SERPL-MCNC: 169 MG/DL (ref 65–140)
GLUCOSE SERPL-MCNC: 84 MG/DL (ref 65–140)
HCT VFR BLD AUTO: 26.8 % (ref 36.5–49.3)
HGB BLD-MCNC: 8.5 G/DL (ref 12–17)
INR PPP: 2.5 (ref 0.86–1.16)
LYMPHOCYTES # BLD AUTO: 1.22 THOUSANDS/ΜL (ref 0.6–4.47)
LYMPHOCYTES NFR BLD AUTO: 25 % (ref 14–44)
MCH RBC QN AUTO: 30.6 PG (ref 26.8–34.3)
MCHC RBC AUTO-ENTMCNC: 31.7 G/DL (ref 31.4–37.4)
MCV RBC AUTO: 96 FL (ref 82–98)
MONOCYTES # BLD AUTO: 0.51 THOUSAND/ΜL (ref 0.17–1.22)
MONOCYTES NFR BLD AUTO: 10 % (ref 4–12)
NEUTROPHILS # BLD AUTO: 2.97 THOUSANDS/ΜL (ref 1.85–7.62)
NEUTS SEG NFR BLD AUTO: 59 % (ref 43–75)
NRBC BLD AUTO-RTO: 0 /100 WBCS
PLATELET # BLD AUTO: 280 THOUSANDS/UL (ref 149–390)
PMV BLD AUTO: 8.6 FL (ref 8.9–12.7)
PROTHROMBIN TIME: 27.3 SECONDS (ref 12.1–14.4)
RBC # BLD AUTO: 2.78 MILLION/UL (ref 3.88–5.62)
WBC # BLD AUTO: 4.98 THOUSAND/UL (ref 4.31–10.16)

## 2017-12-16 PROCEDURE — 97530 THERAPEUTIC ACTIVITIES: CPT

## 2017-12-16 PROCEDURE — 82948 REAGENT STRIP/BLOOD GLUCOSE: CPT

## 2017-12-16 PROCEDURE — 97110 THERAPEUTIC EXERCISES: CPT

## 2017-12-16 PROCEDURE — 97116 GAIT TRAINING THERAPY: CPT

## 2017-12-16 PROCEDURE — 85025 COMPLETE CBC W/AUTO DIFF WBC: CPT | Performed by: NURSE PRACTITIONER

## 2017-12-16 PROCEDURE — 85610 PROTHROMBIN TIME: CPT | Performed by: NURSE PRACTITIONER

## 2017-12-16 PROCEDURE — 97535 SELF CARE MNGMENT TRAINING: CPT

## 2017-12-16 RX ADMIN — GLYBURIDE 2.5 MG: 2.5 TABLET ORAL at 08:06

## 2017-12-16 RX ADMIN — DOXAZOSIN 4 MG: 4 TABLET ORAL at 08:06

## 2017-12-16 RX ADMIN — ACETAMINOPHEN 975 MG: 325 TABLET, FILM COATED ORAL at 20:59

## 2017-12-16 RX ADMIN — METOPROLOL TARTRATE 100 MG: 50 TABLET ORAL at 08:06

## 2017-12-16 RX ADMIN — ASPIRIN 81 MG 324 MG: 81 TABLET ORAL at 08:05

## 2017-12-16 RX ADMIN — ACETAMINOPHEN 975 MG: 325 TABLET, FILM COATED ORAL at 05:53

## 2017-12-16 RX ADMIN — METOPROLOL TARTRATE 100 MG: 50 TABLET ORAL at 20:56

## 2017-12-16 RX ADMIN — LISINOPRIL 40 MG: 20 TABLET ORAL at 08:05

## 2017-12-16 RX ADMIN — INSULIN LISPRO 1 UNITS: 100 INJECTION, SOLUTION INTRAVENOUS; SUBCUTANEOUS at 17:10

## 2017-12-16 RX ADMIN — INSULIN LISPRO 1 UNITS: 100 INJECTION, SOLUTION INTRAVENOUS; SUBCUTANEOUS at 11:20

## 2017-12-16 RX ADMIN — ACETAMINOPHEN 975 MG: 325 TABLET, FILM COATED ORAL at 14:22

## 2017-12-16 RX ADMIN — AMLODIPINE BESYLATE 10 MG: 10 TABLET ORAL at 08:06

## 2017-12-16 RX ADMIN — FOLIC ACID-PYRIDOXINE-CYANOCOBALAMIN TAB 2.5-25-2 MG 1 TABLET: 2.5-25-2 TAB at 08:05

## 2017-12-16 RX ADMIN — WARFARIN SODIUM 5 MG: 5 TABLET ORAL at 17:15

## 2017-12-16 NOTE — PROGRESS NOTES
12/16/17 1300   Pain Assessment   Pain Assessment No/denies pain   Pain Score No Pain   Restrictions/Precautions   Precautions Bed/chair alarms; Fall Risk;THR   Weight Bearing Restrictions Yes   RLE Weight Bearing Per Order WBAT   QI: Sit to Stand   Assistance Needed Supervision   Assistance Provided by [place] No physical assistance   Sit to Stand CARE Score 4   QI: Chair/Bed-to-Chair Transfer   Assistance Needed Supervision   Assistance Provided by [place] No physical assistance   Comment rollator vs RW   Chair/Bed-to-Chair Transfer CARE Score 4   Transfer Bed/Chair/Wheelchair   Limitations Noted In Balance; Endurance   Adaptive Equipment Roller Walker;Rollator   Findings PT trialing rollator with patient  Pt with G carryover of safety with rollator during OT session  Bed, Chair, Wheelchair Transfer (FIM) 5 - Patient requires supervision/monitoring   QI: [address] Provided by [place] No physical assistance   Hector Sonny Paz 83 Score 4   Toileting   Able to [address] down yes, up yes  Able to Manage Clothing Closures Yes   Manage Hygiene Bladder   Limitations Noted In Balance; Safety   Toileting (FIM) 5 - Patient requires supervision/monitoring   Functional Standing Tolerance   Time 5 minutes    Activity static standing   Exercise Tools   Other Exercise Tool 1 UB strengthening completed with use of 3# dowel bar moving through all planes 3 x 10 each to increase UB strength for increased independence with ADL tasks      Cognition   Overall Cognitive Status Impaired   Arousal/Participation Cooperative   Attention Attends with cues to redirect   Orientation Level Oriented X4   Memory Decreased short term memory;Decreased recall of precautions   Following Commands Follows one step commands without difficulty   Activity Tolerance   Activity Tolerance Patient tolerated treatment well   Assessment   Treatment Assessment Pt participated in skilled OT tx session with focus on toileting, functional mobility and strengthening  Pt continues to complete all functional mobility and transfers at a CS level  Pt trialing use of rollator during functional mobility tasks with G carryover of brake management and safety  Pt continues to be limited by decreased BLE strength, endurance, and activity tolerance  Pt will continue to benefit from OT services following POC to increase safety and independence with ADL tasks  Prognosis Good   Problem List Decreased strength;Decreased range of motion;Decreased endurance; Impaired balance;Decreased mobility; Decreased coordination;Decreased safety awareness;Pain   Plan   Treatment/Interventions Functional transfer training; Therapeutic exercise; Endurance training;Equipment eval/education; Compensatory technique education   Progress Progressing toward goals   Recommendation   OT Discharge Recommendation 24 hour supervision/assist   OT Therapy Minutes   OT Time In 1300   OT Time Out 1330   OT Total Time (minutes) 30   OT Mode of treatment - Individual (minutes) 0   OT Mode of treatment - Concurrent (minutes) 30   OT Mode of treatment - Group (minutes) 0   OT Mode of treatment - Co-treat (minutes) 0   OT Mode of Teatment - Total time(minutes) 30 minutes   Therapy Time missed   Time missed?  No

## 2017-12-16 NOTE — PROGRESS NOTES
12/16/17 0930   Pain Assessment   Pain Assessment No/denies pain   Restrictions/Precautions   Precautions Bed/chair alarms; Fall Risk;Pain;THR   Weight Bearing Restrictions Yes   General   Change In Medical/Functional Status no dc noted from incision   Cognition   Overall Cognitive Status Impaired   Arousal/Participation Cooperative   Attention Attends with cues to redirect   Orientation Level Oriented X4   Memory Decreased short term memory;Decreased recall of precautions   Following Commands Follows one step commands without difficulty   Subjective   Subjective Pt states he is tired no new c/o   QI: Roll Left and Right   Assistance Needed Supervision   Assistance Provided by [place] No physical assistance   Roll Left and Right CARE Score 4   QI: Lying to Sitting on Side of Bed   Assistance Needed Supervision   Assistance Provided by [place] No physical assistance   Lying to Sitting on Side of Bed CARE Score 4   QI: Sit to [address] Provided by [place] No physical assistance   Sit to Stand CARE Score 4   Bed Mobility   Findings S level   QI: Chair/Bed-to-Chair Transfer   Assistance Needed Supervision   Assistance Provided by [place] No physical assistance   Chair/Bed-to-Chair Transfer CARE Score 4   Transfer Bed/Chair/Wheelchair   Limitations Noted In Balance; Coordination;LE Strength; Endurance   Adaptive Equipment Roller Walker   Stand Pivot Supervision   Sit to Stand Supervision   Stand to Sit Supervision   Supine to [employer], Chair, Wheelchair Transfer (FIM) 5 - Patient requires supervision/monitoring   QI: Car Transfer   Assistance Needed Supervision   Assistance Provided by [place] No physical assistance   Car Transfer CARE Score 4   QI: [address] Provided by [place] No physical assistance   Walk 10 Feet CARE Score 4   QI: Walk 50 Feet with [address] Assistance Provided by [place] No physical assistance   Walk 50 Feet with Two Turns CARE Score 4   QI: [address] [facility] Provided by [place] No physical assistance   Walk 150 Feet CARE Score 4   QI: Walking 10 Feet on Uneven Surfaces   Reason if not Attempted Activity not applicable   Walking 10 Feet on Uneven Surfaces CARE Score 9   Ambulation   Does the patient walk? 2  Yes   Primary Discharge Mode of Locomotion Walk   Walk Assist Level Close Supervision   Gait Pattern Decreased foot clearance; Forward Flexion; Improper weight shift   Assist Device Rollator;Roller Walker   Distance Walked (feet) 150 ft  (x 3)   Limitations Noted In Balance;Device Management; Heel Strike; Safety;Strength;Speed   Findings Trialed Rollator with cues for HS and pacing self; safely performs Rollator mgmt   Walking (FIM) 5 - Patient requires supervision/monitoring AND distance 150 feet or more, no rest   QI: Wheel 50 Feet with Two Turns   Reason if not Attempted Activity not applicable   Wheel 50 Feet with Two Turns CARE Score 9   QI: Wheel 150 Feet   Reason if not Attempted Activity not applicable   Wheel 982 Feet CARE Score 9   Wheelchair mobility   QI: Does the patient use a wheelchair? 0   No   QI: 1 Step (Curb)   Assistance Needed Supervision   1 Step (Curb) CARE Score 4   QI: 4 Steps   Assistance Needed Supervision   4 Steps CARE Score 4   QI: 12 Steps   Assistance Needed Supervision   12 Steps CARE Score 4   Stairs   Type Stairs  ( 6")   # of Steps 12   Weight Bearing Precautions WBAT;RLE   Assist Devices Bilateral Rail   Stairs (FIM) 5 - Patient requires verbal cues AND goes up and down full flight (12- 14 stairs)   QI: Picking Up Object   Reason if not Attempted Activity not applicable   Picking Up Object CARE Score 9   QI: Toilet Transfer   Assistance Needed Supervision   Assistance Provided by [place] No physical assistance   Toilet Transfer CARE Score 4   Toilet Transfer   Surface Assessed Standard Toilet   Limitations Noted In Balance   Adaptive Equipment Grab Bar   Positioning Concerns Safety   Toilet Transfer (FIM) 5 - Patient requires supervision/monitoring   Therapeutic Interventions   Strengthening STS x 10 w cues for inc APT; standing heel raises x 20; mini squats, marching in place x 20 ea; bed therapy R manual stretch HS x 2-3' followed by gentle IT band STM    Flexibility manual stretch seated HS x 2-3    Assessment   Treatment Assessment Focused on endurance, safety awareness and balance through functional therapuetic actvities  Pt able to perform transitons at S level w cues for safety  Trialed w Rollator w pt demo good uncderstanding of locking mechnaisms and proximity  Pt continues to use posterior knees to PO chair going from STS  Cues for inc APT and using B LEs for STS however due to pt anomaly w R LE ER pt will need to situate B LE to inc RADHA to perform without LOB  Pt will cont to benefit from PT services to meet functioanl goals  Problem List Decreased strength;Decreased range of motion;Decreased endurance; Impaired balance;Decreased mobility; Decreased coordination;Decreased safety awareness;Pain   Barriers to Discharge Decreased caregiver support   PT Barriers   Physical Impairment Decreased strength;Decreased endurance;Decreased mobility; Impaired balance; Impaired judgement;Decreased safety awareness   Functional Limitation Walking;Standing;Stair negotiation;Transfers   Plan   Treatment/Interventions Functional transfer training;LE strengthening/ROM; Elevations; Therapeutic exercise; Endurance training;Bed mobility;Gait training   Progress Progressing toward goals   Recommendation   Recommendation 24 hour supervision/assist   PT Therapy Minutes   PT Time In 0930   PT Time Out 1030   PT Total Time (minutes) 60   PT Mode of treatment - Individual (minutes) 60   PT Mode of treatment - Concurrent (minutes) 0   PT Mode of treatment - Group (minutes) 0   PT Mode of treatment - Co-treat (minutes) 0   PT Mode of Teatment - Total time(minutes) 60 minutes   Therapy Time missed   Time missed?  No

## 2017-12-16 NOTE — PROGRESS NOTES
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/multiple areas intracranial atherosclerosis/severe stenosis inferior Rt MCA:  Continue ASA  Resume Plavix once pt completes a 6 month course of anticoagulation for Lt UE DVT  No statin as pt had hives in the past  API therapeutic  Staples removed    2  Rt hip revision at [facility]) on 11/20/17:  Pain control per primary service  Follow-up with [facility]  Had bleeding 12/15/17 from distal portion of incision that soaked the dressing  CT revealed fluid collection in the Rt thigh which has increased in size not consistent with hematoma - possible seroma/lymphocele and abscess are possibilities  Dr Checo Justin requested surgical consult as they they had seen pt in the past   Spoke with surgical resident last night and she stated Ortho consult would be more appropriate  Patient has not had any further drainage from the hip  3   Rt hip hematoma: occurred after getting tPA  Hemoglobin stable    4  Acute blood loss anemia;  s/p multiple transfusions:  Stable  5   Lt UE DVT:  had been on therapeutic Lovenox until INR > 2 = stopped 12/9/17 since INR was 2 0  Was on Coumadin 5mg qd and increased to 7 5 mg qd on 12/7/17 x 2 days then on 12/9/17 down to 6mg qd = INR jumped again 12/13/17 so gave only 2 mg x 1  Coumadin 5mg resumed last night  INR 2 5 today  COntinue Coumadin at 5mg daily  Thrombosis Panel: + lupus anticoagulant, mildly decreased antithrombin III, PTT-LA mixing study prolonged at 53 sec, and hexagonal phase phospholipid elevated at 16  Pt will need Hematology follow-up and recheck lupus anticoagulant in 12 weeks    6  CAD with CONCEPCION:  Continue full ASA  For API 12/13/17    7    HTN: was not optimally controlled on Amlodipine 10mg daily, Cardura 4mg daily, Lisinopril 40mg daily and Lopressor 50mg every 12 hours so on 12/7/17  increased Lopressor to 75mg q12hrs  On 12/13/17 increased Lopressor to 100mg q12hrs  No changes today  8   CKD stage III; baseline Cr 1 4-1 5: stable at baseline currently  9   DM type 2:  HbA1C in April 7 3  Pt takes Glyburide 5mg 2 tabs in the AM and 1 tab in the PM  Added back glyburide 5mg qAM 12/8 but then decreased to 2 5mg daily due to lower blood sugars  , 64/110, 95, 164  10   Vit D deficiency:  continue 50,000U weekly x 6 and then 1000U/day    11  Urine frequency/elevated PSA:  uro saw today and ordered PVR and UA; already on Doxazosin  Sees Dr Froilan Beck as an OP  12   Dizziness:  Got 12/13 and yesterday while with therapy  On 12/13 BP taken during incident was 148/70 and yesterday systolic was in 773'Y  Will watch      Subjective: Patient seen and examined  Patient states he is doing well  He denies any current complaints      ROS:   GI: denies abdominal pain, change bowel habits or reflux symptoms  Neuro: No new neurologic changes  Respiratory: No Cough, SOB  Cardiovascular: No CP, palpitations     Scheduled Meds:    acetaminophen 975 mg Oral Q8H [facility]   amLODIPine 10 mg Oral Daily   aspirin 324 mg Oral Daily   ergocalciferol 50,000 Units Oral Weekly   Followed by      Fidelia Basilio ON 1/17/2018] cholecalciferol 1,000 Units Oral Daily   doxazosin 4 mg Oral Daily   folic acid-pyridoxine-cyanocobalamin 1 tablet Oral Daily   glyBURIDE 2 5 mg Oral Daily With Breakfast   insulin lispro 1-5 Units Subcutaneous TID AC   insulin lispro 1-5 Units Subcutaneous HS   lisinopril 40 mg Oral Daily   metoprolol tartrate 100 mg Oral Q12H [facility]   polyethylene glycol 17 g Oral Daily   warfarin 5 mg Oral Daily (warfarin)       Labs:       Results from last 7 days  Lab Units 12/16/17  0556 12/14/17  0705   WBC Thousand/uL 4 98 5 95   HEMOGLOBIN g/dL 8 5* 9 0*   HEMATOCRIT % 26 8* 27 8*   PLATELETS Thousands/uL 280 312       Results from last 7 days  Lab Units 12/14/17  0705 12/11/17  0548   SODIUM mmol/L 135* 137   POTASSIUM mmol/L 4 8 4 6   CHLORIDE mmol/L 104 106   CO2 mmol/L 26 25   BUN mg/dL 31* 29*   CREATININE mg/dL 1 37* 1 24   GLUCOSE RANDOM mg/dL 128 91   CALCIUM mg/dL 8 8 8 5           Results from last 7 days  Lab Units 12/16/17  0556 12/15/17  0540   INR  2 50* 2 56*          Glucose (mg/dL)   Date Value   12/14/2017 128   12/11/2017 91   12/08/2017 130   12/05/2017 129   12/01/2015 141 (H)   07/14/2015 124 (H)   09/11/2014 116 (H)   01/09/2014 134 (H)     Glucose, i-STAT (mg/dl)   Date Value   11/24/2017 171 (H)       Labs reviewed    Physical Examination:  Vitals:   Vitals:    12/15/17 0903 12/15/17 1343 12/15/17 2133 12/16/17 0550   BP: 135/63 115/55 161/70 159/68   Pulse: 64 60 62 61   Resp:  20 18 20   Temp:  98 7 °F (37 1 °C) 98 8 °F (37 1 °C) 98 6 °F (37 °C)   TempSrc:  Oral Oral Oral   SpO2:  98% 97% 97%   Weight:       Height:           GEN: NAD  HEENT: NC/AT  RESP: BBS w/o crackles/wheeze/rhonci; resp unlabored  CV: +S1 S2, regular rate, no rubs/murmurs  ABD: soft, NT, ND, normal BS   : no villarreal  EXT: 1+ RLE edema = continue TEDS;  Right hip incision is clean and dry w/o any drainage/bleeding currently  No erythema noted just old ecchymosis  Skin: no rashes  Neuro: AAO, limited movement of Right leg 2/2 pain/surgery otherwise rest of extremities 5/5      [ X ] Total time spent: 30 Mins and greater than 50% of this time was spent counseling/coordinating care        Nicci Jin PA-C  Internal Medicine

## 2017-12-17 LAB
GLUCOSE SERPL-MCNC: 111 MG/DL (ref 65–140)
GLUCOSE SERPL-MCNC: 133 MG/DL (ref 65–140)
GLUCOSE SERPL-MCNC: 161 MG/DL (ref 65–140)
GLUCOSE SERPL-MCNC: 167 MG/DL (ref 65–140)

## 2017-12-17 PROCEDURE — 82948 REAGENT STRIP/BLOOD GLUCOSE: CPT

## 2017-12-17 PROCEDURE — 97530 THERAPEUTIC ACTIVITIES: CPT

## 2017-12-17 PROCEDURE — 97116 GAIT TRAINING THERAPY: CPT

## 2017-12-17 PROCEDURE — 97530 THERAPEUTIC ACTIVITIES: CPT | Performed by: STUDENT IN AN ORGANIZED HEALTH CARE EDUCATION/TRAINING PROGRAM

## 2017-12-17 PROCEDURE — 97110 THERAPEUTIC EXERCISES: CPT | Performed by: STUDENT IN AN ORGANIZED HEALTH CARE EDUCATION/TRAINING PROGRAM

## 2017-12-17 RX ADMIN — GLYBURIDE 2.5 MG: 2.5 TABLET ORAL at 08:13

## 2017-12-17 RX ADMIN — LISINOPRIL 40 MG: 20 TABLET ORAL at 08:12

## 2017-12-17 RX ADMIN — FOLIC ACID-PYRIDOXINE-CYANOCOBALAMIN TAB 2.5-25-2 MG 1 TABLET: 2.5-25-2 TAB at 08:12

## 2017-12-17 RX ADMIN — INSULIN LISPRO 1 UNITS: 100 INJECTION, SOLUTION INTRAVENOUS; SUBCUTANEOUS at 22:25

## 2017-12-17 RX ADMIN — ACETAMINOPHEN 975 MG: 325 TABLET, FILM COATED ORAL at 14:04

## 2017-12-17 RX ADMIN — METOPROLOL TARTRATE 100 MG: 50 TABLET ORAL at 08:12

## 2017-12-17 RX ADMIN — INSULIN LISPRO 1 UNITS: 100 INJECTION, SOLUTION INTRAVENOUS; SUBCUTANEOUS at 11:46

## 2017-12-17 RX ADMIN — METOPROLOL TARTRATE 100 MG: 50 TABLET ORAL at 22:24

## 2017-12-17 RX ADMIN — ACETAMINOPHEN 975 MG: 325 TABLET, FILM COATED ORAL at 06:43

## 2017-12-17 RX ADMIN — DOXAZOSIN 4 MG: 4 TABLET ORAL at 08:12

## 2017-12-17 RX ADMIN — ACETAMINOPHEN 975 MG: 325 TABLET, FILM COATED ORAL at 22:24

## 2017-12-17 RX ADMIN — WARFARIN SODIUM 5 MG: 5 TABLET ORAL at 18:05

## 2017-12-17 RX ADMIN — AMLODIPINE BESYLATE 10 MG: 10 TABLET ORAL at 08:12

## 2017-12-17 RX ADMIN — ASPIRIN 81 MG 324 MG: 81 TABLET ORAL at 08:12

## 2017-12-17 NOTE — PROGRESS NOTES
Orthopedics   Chasidy Lora [de-identified] y o  male MRN: [id number]  Unit/Bed#: -01      Subjective:  Wound drainage has stopped for past 48 hours       Labs:    0  Lab Value Date/Time   HCT 26 8 (L) 12/16/2017 0556   HCT 27 8 (L) 12/14/2017 0705   HCT 25 2 (L) 12/11/2017 0548   HGB 8 5 (L) 12/16/2017 0556   HGB 9 0 (L) 12/14/2017 0705   HGB 8 2 (L) 12/11/2017 0548   INR 2 50 (H) 12/16/2017 0556   WBC 4 98 12/16/2017 0556   WBC 5 95 12/14/2017 0705   WBC 4 84 12/11/2017 0548   ESR 30 (H) 06/01/2016 1414   CRP 7 8 (H) 06/01/2016 1414       Meds:    Current Facility-Administered Medications:     acetaminophen (TYLENOL) tablet 975 mg, 975 mg, Oral, Q8H [address], Austin Simental MD, 975 mg at 12/17/17 1404    amLODIPine (NORVASC) tablet 10 mg, 10 mg, Oral, Daily, Austin Simental MD, 10 mg at 12/17/17 5230    aspirin chewable tablet 324 mg, 324 mg, Oral, Daily, Austin Simental MD, 324 mg at 12/17/17 8370    bisacodyl (DULCOLAX) rectal suppository 10 mg, 10 mg, Rectal, Daily PRN, Austin Simental MD    ergocalciferol (VITAMIN D2) capsule 50,000 Units, 50,000 Units, Oral, Weekly, 50,000 Units at 12/13/17 0814 **FOLLOWED BY** [START ON 1/17/2018] cholecalciferol (VITAMIN D3) tablet 1,000 Units, 1,000 Units, Oral, Daily, JALEEL Mccall    doxazosin (CARDURA) tablet 4 mg, 4 mg, Oral, Daily, Austin Simental MD, 4 mg at 53/50/77 9438    folic acid-pyridoxine-cyanocobalamin 2 5-25-2 mg per tablet 1 tablet, 1 tablet, Oral, Daily, Austin Simental MD, 1 tablet at 12/17/17 9739    glyBURIDE (DIABETA) tablet 2 5 mg, 2 5 mg, Oral, Daily With Breakfast, JALEEL Mccall, 2 5 mg at 12/17/17 0813    insulin lispro (HumaLOG) 100 units/mL subcutaneous injection 1-5 Units, 1-5 Units, Subcutaneous, TID AC, 1 Units at 12/17/17 1146 **AND** Fingerstick Glucose (POCT), , , TID AC, Austin Simental MD    insulin lispro (HumaLOG) 100 units/mL subcutaneous injection 1-5 Units, 1-5 Units, Subcutaneous, HS, Austin Simental MD, 1 Units at 12/14/17 2127    lisinopril (ZESTRIL) tablet 40 mg, 40 mg, Oral, Daily, Marium Jolly MD, 40 mg at 12/17/17 2071    magnesium hydroxide (MILK OF MAGNESIA) 400 mg/5 mL oral suspension 30 mL, 30 mL, Oral, Daily PRN, Marium Jolly MD    metoprolol tartrate (LOPRESSOR) tablet 100 mg, 100 mg, Oral, Q12H SACHIN, JALEEL Thompson, 100 mg at 12/17/17 6941    polyethylene glycol (MIRALAX) packet 17 g, 17 g, Oral, Daily, Marium Jolly MD, 17 g at 12/15/17 3130    traMADol (ULTRAM) tablet 50 mg, 50 mg, Oral, BID PRN, Sydney Hansen DO, 50 mg at 12/10/17 1676    warfarin (COUMADIN) tablet 5 mg, 5 mg, Oral, Daily (warfarin), JALEEL Freeman, 5 mg at 12/16/17 1715    Blood Culture:   No results found for: BLOODCX    Wound Culture:   Lab Results   Component Value Date    WOUNDCULT No growth 11/24/2017       Ins and Outs:  I/O last 24 hours: In: 0517 [P O :1433]  Out: 7383 [Urine:2420]          Physical Exam:   /72   Pulse 57   Temp 98 4 °F (36 9 °C) (Oral)   Resp 20   Ht 5' 10" (1 778 m)   Wt 102 kg (223 lb 15 8 oz)   SpO2 98%   BMI 32 14 kg/m²   Gen: Alert and oriented to person, place, time  Musculoskeletal: right lower extremity  · Right hip incision clean and intact, no drainage noted  · No pain with passive hip/knee flexion, extension, ab/adduction  · SILT s/s/sp/dp/t    · +fhl/ehl/ta/gsc  · Toes wwp    Radiology:   I personally reviewed the films  CT abd pelvis shows a hematoma in the right hip  CT right hip shows interval increase in size of hematoma    _*_*_*_*_*_*_*_*_*_*_*_*_*_*_*_*_*_*_*_*_*_*_*_*_*_*_*_*_*_*_*_*_*_*_*_*_*_*_*_*_*    Assessment:  82 y o male s/p revision right MAREN at [facility] with thigh pain after TPA administration for stroke  Wound drainage has stopped for past 48 hours   Overall clinically improving with rehabilitation    Plan:   · Weight bearing and therapy per [facility] health  · Please contact primary surgeon to notify of pts condition  · Pain control    Ana Damon

## 2017-12-17 NOTE — PLAN OF CARE
Problem: INFECTION - ADULT  Goal: Absence of fever/infection during neutropenic period  INTERVENTIONS:  - Monitor WBC  - Implement neutropenic guidelines   Outcome: Completed Date Met: 12/17/17

## 2017-12-17 NOTE — PROGRESS NOTES
12/17/17 2013   Pain Assessment   Pain Assessment No/denies pain   Restrictions/Precautions   Precautions Bed/chair alarms; Fall Risk;THR   RLE ROM Restriction Range Limitation   Subjective   Subjective pt stated his incision was bleeding yesterday; no c/o today    QI: Roll Left and Right   Reason if not Attempted Activity not applicable   Roll Left and Right CARE Score 9   QI: Sit to Lying   Reason if not Attempted Activity not applicable   Sit to Lying CARE Score 9   QI: Lying to Sitting on Side of Bed   Reason if not Attempted Activity not applicable   Lying to Sitting on Side of Bed CARE Score 9   QI: Sit to Stand   Assistance Needed Supervision   Sit to Stand CARE Score 4   QI: Chair/Bed-to-Chair Transfer   Assistance Needed Supervision; Adaptive equipment   Chair/Bed-to-Chair Transfer CARE Score 4   Transfer Bed/Chair/Wheelchair   Limitations Noted In Balance; Coordination;LE Strength   Adaptive Equipment Roller Walker   Stand Pivot Supervision   Sit to Stand Supervision   Stand to W  [place], Chair, Wheelchair Transfer (FIM) 5 - Patient requires supervision/monitoring   QI: Car Transfer   Reason if not Attempted Activity not applicable   Car Transfer CARE Score 9   QI: Walk 10 Feet   Assistance Needed Supervision; Adaptive equipment   Walk 10 Feet CARE Score 4   QI: Walk 50 Feet with Two Turns   Assistance Needed Supervision; Adaptive equipment   Walk 50 Feet with Two Turns CARE Score 4   QI: Walk 150 Feet   Assistance Needed Supervision; Adaptive equipment   Walk 150 Feet CARE Score 4   Ambulation   Does the patient walk? 2  Yes   Primary Discharge Mode of Locomotion Walk   Walk Assist Level Close Supervision   Gait Pattern Decreased foot clearance; Improper weight shift; Forward Flexion;Narrow RADHA   Assist Device Automatic Data   Distance Walked (feet) 150 ft   Limitations Noted In Balance;Device Management;Posture; Safety;Speed;Strength;Swing   Findings cont to trial rollator; pt can be quick; cues to slow down   Walking (FIM) 5 - Patient requires verbal cues AND distance 150 feet or more, no rest   QI: Wheel 50 Feet with Two Turns   Reason if not Attempted Activity not applicable   Wheel 50 Feet with Two Turns CARE Score 9   QI: Wheel 150 Feet   Reason if not Attempted Activity not applicable   Wheel 588 Feet CARE Score 9   Wheelchair mobility   QI: Does the patient use a wheelchair? 0  No   QI: 1 Step (Curb)   Assistance Needed Supervision; Adaptive equipment   1 Step (Curb) CARE Score 4   QI: 4 Steps   Assistance Needed Supervision   4 Steps CARE Score 4   QI: 12 Steps   Assistance Needed Supervision;Verbal cues   Comment needs cues to slow down for safety   12 Steps CARE Score 4   Stairs   Type Stairs;Curb   # of Steps 12   Assist Devices Bilateral Rail;Roller Walker   Findings cues to slow down   Stairs (FIM) 5 - Patient requires verbal cues AND goes up and down full flight (12- 14 stairs)   QI: Picking Up Object   Reason if not Attempted Activity not applicable   Picking Up Object CARE Score 9   QI: Toilet Transfer   Reason if not Attempted Activity not applicable   Toilet Transfer CARE Score 9   Assessment   Treatment Assessment pt cont to trial rollator, however, pt is safer with RW and pt stated the rollator can be quick and at times can't always control it   pt cont to demonstrate decrease safety and needs cues to decrease fall risk  pt to cont focus on balance nad safety to progress with functional mobility for d/c home  Problem List Decreased strength; Impaired balance;Decreased mobility; Decreased safety awareness;Orthopedic restrictions   Barriers to Discharge Decreased caregiver support   PT Barriers   Physical Impairment Decreased strength;Decreased endurance; Impaired balance;Decreased mobility; Decreased safety awareness;Orthopedic restrictions   Functional Limitation Walking;Stair negotiation;Car transfers   Plan   Treatment/Interventions Functional transfer training;LE strengthening/ROM; Endurance training;Gait training   Progress Progressing toward goals   Recommendation   Recommendation 24 hour supervision/assist   PT Therapy Minutes   PT Time In 0930   PT Time Out 1000   PT Total Time (minutes) 30   PT Mode of treatment - Individual (minutes) 30   PT Mode of treatment - Concurrent (minutes) 0   PT Mode of treatment - Group (minutes) 0   PT Mode of treatment - Co-treat (minutes) 0   PT Mode of Teatment - Total time(minutes) 30 minutes   Therapy Time missed   Time missed?  No

## 2017-12-17 NOTE — PROGRESS NOTES
12/17/17 1100   Pain Assessment   Pain Assessment No/denies pain   Pain Score No Pain   Restrictions/Precautions   Precautions Bed/chair alarms; Fall Risk;THR   Weight Bearing Restrictions Yes   RLE Weight Bearing Per Order WBAT   ROM Restrictions Yes   RLE ROM Restriction Range Limitation  (THP)   QI: Sit to Stand   Assistance Needed Supervision   Assistance Provided by [place] No physical assistance   Sit to Stand CARE Score 4   QI: Chair/Bed-to-Chair Transfer   Assistance Needed Supervision   Assistance Provided by [place] No physical assistance   Chair/Bed-to-Chair Transfer CARE Score 4   Transfer Bed/Chair/Wheelchair   Limitations Noted In Balance; Endurance   Adaptive Equipment Roller Walker   Stand Pivot Supervision   Sit to Stand Supervision   Stand to W  R  [place], Chair, Wheelchair Transfer (FIM) 5 - Patient requires supervision/monitoring   Cognition   Overall Cognitive Status Impaired   Arousal/Participation Cooperative   Attention Attends with cues to redirect   Orientation Level Oriented X4   Memory Decreased short term memory;Decreased recall of precautions   Following Commands Follows one step commands without difficulty   Activity Tolerance   Activity Tolerance Patient tolerated treatment well   Assessment   Treatment Assessment Pt participates in OT session with focus on activity tolerance and UB/LB strengthening to increase I for d/c  Pt supervision functional mobility with RW from bedside chair to OT gym  Pt engaged in UE strengthening with 4# dumbell for bicep curls 4x10 alternating UE to increase strength  Pt engaged in standing tolerance activity at the table with side stepping with unilateral release of hand to grab and carry bean bags and place in container at ends of table  Pt requires rest break to sit 2* decreased endurance  Pt will continue to benefit from overall strengthening and activity tolerance     Prognosis Good   Problem List Decreased strength;Decreased range of motion;Decreased endurance; Impaired balance;Decreased mobility; Decreased safety awareness;Pain   Plan   Treatment/Interventions Functional transfer training;LE strengthening/ROM; Therapeutic exercise; Endurance training   Progress Progressing toward goals   OT Therapy Minutes   OT Time In 1100   OT Time Out 1130   OT Total Time (minutes) 30   OT Mode of treatment - Individual (minutes) 30   OT Mode of treatment - Concurrent (minutes) 0   OT Mode of treatment - Group (minutes) 0   OT Mode of treatment - Co-treat (minutes) 0   OT Mode of Teatment - Total time(minutes) 30 minutes   Therapy Time missed   Time missed?  No

## 2017-12-17 NOTE — PROGRESS NOTES
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/multiple areas intracranial atherosclerosis/severe stenosis inferior Rt MCA:  Continue ASA  Resume Plavix once pt completes a 6 month course of anticoagulation for Lt UE DVT  No statin as pt had hives in the past  API therapeutic  Staples removed    2  Rt hip revision at [facility]) on 11/20/17:  Pain control per primary service  Follow-up with [facility]  Had bleeding 12/15/17 from distal portion of incision that soaked the dressing  CT revealed fluid collection in the Rt thigh which has increased in size not consistent with hematoma - possible seroma/lymphocele and abscess are possibilities  Dr Yoandy Sanchez requested surgical consult as they they had seen pt in the past   Spoke with surgical resident last night and she stated Ortho consult would be more appropriate  Patient has not had any further drainage from the hip  3   Rt hip hematoma: occurred after getting tPA  Hemoglobin stable    4  Acute blood loss anemia;  s/p multiple transfusions:  Stable  5   Lt UE DVT:  had been on therapeutic Lovenox until INR > 2 = stopped 12/9/17 since INR was 2 0  Was on Coumadin 5mg qd and increased to 7 5 mg qd on 12/7/17 x 2 days then on 12/9/17 down to 6mg qd = INR jumped again 12/13/17 so gave only 2 mg x 1  Coumadin 5mg resumed last night  INR 2 5 today  COntinue Coumadin at 5mg daily  Thrombosis Panel: + lupus anticoagulant, mildly decreased antithrombin III, PTT-LA mixing study prolonged at 53 sec, and hexagonal phase phospholipid elevated at 16  Pt will need Hematology follow-up and recheck lupus anticoagulant in 12 weeks    6  CAD with CONCEPCION:  Continue full ASA  For API 12/13/17    7    HTN: was not optimally controlled on Amlodipine 10mg daily, Cardura 4mg daily, Lisinopril 40mg daily and Lopressor 50mg every 12 hours so on 12/7/17  increased Lopressor to 75mg q12hrs  On 12/13/17 increased Lopressor to 100mg q12hrs  No changes today  8   CKD stage III; baseline Cr 1 4-1 5: stable at baseline currently  9   DM type 2:  HbA1C in April 7 3  Pt takes Glyburide 5mg 2 tabs in the AM and 1 tab in the PM  Added back glyburide 5mg qAM 12/8 but then decreased to 2 5mg daily due to lower blood sugars  , 84, 100, 111  10   Vit D deficiency:  continue 50,000U weekly x 6 and then 1000U/day    11  Urine frequency/elevated PSA:  uro saw today and ordered PVR and UA; already on Doxazosin  Sees Dr Trenton Ashford as an OP  12   Dizziness:  Got 12/13 and yesterday while with therapy  On 12/13 BP taken during incident was 148/70 and yesterday systolic was in 839'Y  Will watch      Subjective: Patient seen and examined  Patient states he is doing well  He denies any current complaints      ROS:   GI: denies abdominal pain, change bowel habits or reflux symptoms  Neuro: No new neurologic changes  Respiratory: No Cough, SOB  Cardiovascular: No CP, palpitations     Scheduled Meds:    acetaminophen 975 mg Oral Q8H [address]   amLODIPine 10 mg Oral Daily   aspirin 324 mg Oral Daily   ergocalciferol 50,000 Units Oral Weekly   Followed by      Margie Bro ON 1/17/2018] cholecalciferol 1,000 Units Oral Daily   doxazosin 4 mg Oral Daily   folic acid-pyridoxine-cyanocobalamin 1 tablet Oral Daily   glyBURIDE 2 5 mg Oral Daily With Breakfast   insulin lispro 1-5 Units Subcutaneous TID AC   insulin lispro 1-5 Units Subcutaneous HS   lisinopril 40 mg Oral Daily   metoprolol tartrate 100 mg Oral Q12H [address]   polyethylene glycol 17 g Oral Daily   warfarin 5 mg Oral Daily (warfarin)       Labs:       Results from last 7 days  Lab Units 12/16/17  0556 12/14/17  0705   WBC Thousand/uL 4 98 5 95   HEMOGLOBIN g/dL 8 5* 9 0*   HEMATOCRIT % 26 8* 27 8*   PLATELETS Thousands/uL 280 312       Results from last 7 days  Lab Units 12/14/17  0705 12/11/17  0548   SODIUM mmol/L 135* 137   POTASSIUM mmol/L 4 8 4 6   CHLORIDE mmol/L 104 106   CO2 mmol/L 26 25   BUN mg/dL 31* 29*   CREATININE mg/dL 1 37* 1 24   GLUCOSE RANDOM mg/dL 128 91   CALCIUM mg/dL 8 8 8 5           Results from last 7 days  Lab Units 12/16/17  0556 12/15/17  0540   INR  2 50* 2 56*          Glucose (mg/dL)   Date Value   12/14/2017 128   12/11/2017 91   12/08/2017 130   12/05/2017 129   12/01/2015 141 (H)   07/14/2015 124 (H)   09/11/2014 116 (H)   01/09/2014 134 (H)     Glucose, i-STAT (mg/dl)   Date Value   11/24/2017 171 (H)       Labs reviewed    Physical Examination:  Vitals:   Vitals:    12/16/17 0550 12/16/17 1406 12/16/17 2056 12/17/17 0556   BP: 159/68 125/60 159/75 132/65   Pulse: 61 58 55 56   Resp: 20 20 20 18   Temp: 98 6 °F (37 °C) 97 8 °F (36 6 °C) 98 1 °F (36 7 °C) 98 7 °F (37 1 °C)   TempSrc: Oral Oral Oral Oral   SpO2: 97% 94%  96%   Weight:       Height:           GEN: NAD  HEENT: NC/AT  RESP: BBS w/o crackles/wheeze/rhonci; resp unlabored  CV: +S1 S2, regular rate, no rubs/murmurs  ABD: soft, NT, ND, normal BS   : no villarreal  EXT: 1+ RLE edema = continue TEDS;  Right hip incision is clean and dry w/o any drainage/bleeding currently  No erythema noted just old ecchymosis  Skin: no rashes  Neuro: AAO, limited movement of Right leg 2/2 pain/surgery otherwise rest of extremities 5/5      [ X ] Total time spent: 30 Mins and greater than 50% of this time was spent counseling/coordinating care        Derek Ortiz PA-C  Internal Medicine

## 2017-12-18 ENCOUNTER — GENERIC CONVERSION - ENCOUNTER (OUTPATIENT)
Dept: OTHER | Facility: OTHER | Age: 82
End: 2017-12-18

## 2017-12-18 VITALS
BODY MASS INDEX: 32.07 KG/M2 | TEMPERATURE: 98.3 F | SYSTOLIC BLOOD PRESSURE: 134 MMHG | DIASTOLIC BLOOD PRESSURE: 62 MMHG | WEIGHT: 223.99 LBS | OXYGEN SATURATION: 98 % | HEIGHT: 70 IN | RESPIRATION RATE: 20 BRPM | HEART RATE: 54 BPM

## 2017-12-18 LAB
BASOPHILS # BLD AUTO: 0.04 THOUSANDS/ΜL (ref 0–0.1)
BASOPHILS NFR BLD AUTO: 1 % (ref 0–1)
EOSINOPHIL # BLD AUTO: 0.31 THOUSAND/ΜL (ref 0–0.61)
EOSINOPHIL NFR BLD AUTO: 6 % (ref 0–6)
ERYTHROCYTE [DISTWIDTH] IN BLOOD BY AUTOMATED COUNT: 17.8 % (ref 11.6–15.1)
GLUCOSE SERPL-MCNC: 173 MG/DL (ref 65–140)
GLUCOSE SERPL-MCNC: 99 MG/DL (ref 65–140)
HCT VFR BLD AUTO: 27.9 % (ref 36.5–49.3)
HGB BLD-MCNC: 9 G/DL (ref 12–17)
INR PPP: 2.78 (ref 0.86–1.16)
LYMPHOCYTES # BLD AUTO: 1.45 THOUSANDS/ΜL (ref 0.6–4.47)
LYMPHOCYTES NFR BLD AUTO: 30 % (ref 14–44)
MCH RBC QN AUTO: 31 PG (ref 26.8–34.3)
MCHC RBC AUTO-ENTMCNC: 32.3 G/DL (ref 31.4–37.4)
MCV RBC AUTO: 96 FL (ref 82–98)
MONOCYTES # BLD AUTO: 0.46 THOUSAND/ΜL (ref 0.17–1.22)
MONOCYTES NFR BLD AUTO: 10 % (ref 4–12)
NEUTROPHILS # BLD AUTO: 2.54 THOUSANDS/ΜL (ref 1.85–7.62)
NEUTS SEG NFR BLD AUTO: 53 % (ref 43–75)
NRBC BLD AUTO-RTO: 0 /100 WBCS
PLATELET # BLD AUTO: 275 THOUSANDS/UL (ref 149–390)
PMV BLD AUTO: 9 FL (ref 8.9–12.7)
PROTHROMBIN TIME: 29.7 SECONDS (ref 12.1–14.4)
RBC # BLD AUTO: 2.9 MILLION/UL (ref 3.88–5.62)
WBC # BLD AUTO: 4.81 THOUSAND/UL (ref 4.31–10.16)

## 2017-12-18 PROCEDURE — 97535 SELF CARE MNGMENT TRAINING: CPT | Performed by: OCCUPATIONAL THERAPY ASSISTANT

## 2017-12-18 PROCEDURE — 85610 PROTHROMBIN TIME: CPT | Performed by: NURSE PRACTITIONER

## 2017-12-18 PROCEDURE — 82948 REAGENT STRIP/BLOOD GLUCOSE: CPT

## 2017-12-18 PROCEDURE — 97110 THERAPEUTIC EXERCISES: CPT

## 2017-12-18 PROCEDURE — 97530 THERAPEUTIC ACTIVITIES: CPT

## 2017-12-18 PROCEDURE — 85025 COMPLETE CBC W/AUTO DIFF WBC: CPT | Performed by: PHYSICAL MEDICINE & REHABILITATION

## 2017-12-18 RX ORDER — WARFARIN SODIUM 5 MG/1
TABLET ORAL
Qty: 1 TABLET | Refills: 0
Start: 2017-12-18 | End: 2018-03-28 | Stop reason: SDUPTHER

## 2017-12-18 RX ORDER — DOXAZOSIN MESYLATE 4 MG/1
4 TABLET ORAL DAILY
Qty: 1 TABLET | Refills: 0
Start: 2017-12-19 | End: 2018-02-09 | Stop reason: SDUPTHER

## 2017-12-18 RX ORDER — METOPROLOL TARTRATE 100 MG/1
100 TABLET ORAL EVERY 12 HOURS SCHEDULED
Qty: 2 TABLET | Refills: 0
Start: 2017-12-18 | End: 2018-01-26

## 2017-12-18 RX ORDER — ASPIRIN 81 MG/1
324 TABLET, CHEWABLE ORAL DAILY
Qty: 4 TABLET | Refills: 0
Start: 2017-12-19 | End: 2018-04-19

## 2017-12-18 RX ORDER — ERGOCALCIFEROL 1.25 MG/1
50000 CAPSULE ORAL WEEKLY
Qty: 4 CAPSULE | Refills: 0
Start: 2017-12-20 | End: 2018-01-26

## 2017-12-18 RX ORDER — GLYBURIDE 2.5 MG/1
2.5 TABLET ORAL
Qty: 1 TABLET | Refills: 0
Start: 2017-12-19 | End: 2018-02-09 | Stop reason: SDUPTHER

## 2017-12-18 RX ORDER — LISINOPRIL 40 MG/1
40 TABLET ORAL DAILY
Qty: 1 TABLET | Refills: 0
Start: 2017-12-19 | End: 2018-02-09 | Stop reason: SDUPTHER

## 2017-12-18 RX ORDER — AMLODIPINE BESYLATE 10 MG/1
10 TABLET ORAL DAILY
Qty: 1 TABLET | Refills: 0
Start: 2017-12-19 | End: 2018-05-24 | Stop reason: SDUPTHER

## 2017-12-18 RX ADMIN — FOLIC ACID-PYRIDOXINE-CYANOCOBALAMIN TAB 2.5-25-2 MG 1 TABLET: 2.5-25-2 TAB at 08:46

## 2017-12-18 RX ADMIN — ASPIRIN 81 MG 324 MG: 81 TABLET ORAL at 08:46

## 2017-12-18 RX ADMIN — GLYBURIDE 2.5 MG: 2.5 TABLET ORAL at 08:46

## 2017-12-18 RX ADMIN — LISINOPRIL 40 MG: 20 TABLET ORAL at 08:48

## 2017-12-18 RX ADMIN — ACETAMINOPHEN 975 MG: 325 TABLET, FILM COATED ORAL at 06:26

## 2017-12-18 RX ADMIN — AMLODIPINE BESYLATE 10 MG: 10 TABLET ORAL at 08:48

## 2017-12-18 RX ADMIN — DOXAZOSIN 4 MG: 4 TABLET ORAL at 08:48

## 2017-12-18 RX ADMIN — METOPROLOL TARTRATE 100 MG: 50 TABLET ORAL at 08:47

## 2017-12-18 NOTE — NURSING NOTE
Pt discharged with all belonging and report called to receiving facility  No new skin breakdown noted  AVS faxed and given to son to give to receiving facility  No questions noted

## 2017-12-18 NOTE — SOCIAL WORK
Pt is cleared for dc today and cm confirmed with reina at [facility] that they can accept today  Cm phoned son Gabriella Mcmullen who wishes to provide transport  He will be onsite between 12 and 12 30  Pt family facility and staff aware of dc arrangements

## 2017-12-18 NOTE — PROGRESS NOTES
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/multiple areas intracranial atherosclerosis/severe stenosis inferior Rt MCA:  Continue ASA  Resume Plavix once pt completes a 6 month course of anticoagulation for Lt UE DVT  No statin as pt had hives in the past  API therapeutic  Staples removed  2   Rt hip revision at [facility]) on 11/20/17:  Pain control per primary service  Follow-up with [facility]  Had bleeding 12/15/17 from distal portion of incision that soaked the dressing  CT revealed fluid collection in the Rt thigh which has increased in size not consistent with hematoma - possible seroma/lymphocele and abscess are possibilities  Dr Zofia Harvey requested surgical consult as they they had seen pt in the past   Spoke with surgical resident over the weekend and she stated Ortho consult would be more appropriate = ortho saw; patient has not had any further drainage from the hip and advised that we contact primary ortho surgeon about issue = he was notified and will see in office in 1 week  3   Rt hip hematoma: occurred after getting tPA  Hemoglobin stable    4  Acute blood loss anemia;  s/p multiple transfusions:  Stable  5   Lt UE DVT:  Continue Coumadin at 5mg daily  Thrombosis Panel: + lupus anticoagulant, mildly decreased antithrombin III, PTT-LA mixing study prolonged at 53 sec, and hexagonal phase phospholipid elevated at 16  Pt will need Hematology follow-up and recheck lupus anticoagulant in 12 weeks    6  CAD with CONCEPCION:  Continue full ASA  For API 12/13/17    7  HTN: was not optimally controlled on Amlodipine 10mg daily, Cardura 4mg daily, Lisinopril 40mg daily and Lopressor 50mg every 12 hours so on 12/7/17  increased Lopressor to 75mg q12hrs  On 12/13/17 increased Lopressor to 100mg q12hrs  No changes today  8   CKD stage III; baseline Cr 1 4-1 5: stable at baseline currently  9   DM type 2:  HbA1C in April 7 3  Pt takes Glyburide 5mg 2 tabs in the AM and 1 tab in the PM  Added back glyburide 5mg qAM 12/8 but then decreased to 2 5mg daily due to lower blood sugars = no changes today  , 161, 133, 167; fasting today 99     10   Vit D deficiency:  continue 50,000U weekly x 6 and then 1000U/day    11  Urine frequency/elevated PSA:  uro saw today and ordered PVR and UA; already on Doxazosin  Sees Dr Ngoc Brown as an OP  12   Dizziness:  Got 12/13 and yesterday while with therapy  On 12/13 BP taken during incident was 148/70 = no c/o of this over the weekend  Subjective: Patient seen and examined  Patient states he is doing well  He denies any current complaints      ROS:   GI: denies abdominal pain, change bowel habits or reflux symptoms  Neuro: No new neurologic changes  Respiratory: No Cough, SOB  Cardiovascular: No CP, palpitations     Scheduled Meds:    acetaminophen 975 mg Oral Q8H [address]   amLODIPine 10 mg Oral Daily   aspirin 324 mg Oral Daily   ergocalciferol 50,000 Units Oral Weekly   Followed by      Grace Black ON 1/17/2018] cholecalciferol 1,000 Units Oral Daily   doxazosin 4 mg Oral Daily   folic acid-pyridoxine-cyanocobalamin 1 tablet Oral Daily   glyBURIDE 2 5 mg Oral Daily With Breakfast   insulin lispro 1-5 Units Subcutaneous TID AC   insulin lispro 1-5 Units Subcutaneous HS   lisinopril 40 mg Oral Daily   metoprolol tartrate 100 mg Oral Q12H [address]   polyethylene glycol 17 g Oral Daily   warfarin 5 mg Oral Daily (warfarin)       Labs:       Results from last 7 days  Lab Units 12/18/17  0525 12/16/17  0556   WBC Thousand/uL 4 81 4 98   HEMOGLOBIN g/dL 9 0* 8 5*   HEMATOCRIT % 27 9* 26 8*   PLATELETS Thousands/uL 275 280       Results from last 7 days  Lab Units 12/14/17  0705   SODIUM mmol/L 135*   POTASSIUM mmol/L 4 8   CHLORIDE mmol/L 104   CO2 mmol/L 26   BUN mg/dL 31* CREATININE mg/dL 1 37*   GLUCOSE RANDOM mg/dL 128   CALCIUM mg/dL 8 8           Results from last 7 days  Lab Units 12/18/17  0525 12/16/17  0556   INR  2 78* 2 50*          Glucose (mg/dL)   Date Value   12/14/2017 128   12/11/2017 91   12/08/2017 130   12/05/2017 129   12/01/2015 141 (H)   07/14/2015 124 (H)   09/11/2014 116 (H)   01/09/2014 134 (H)     Glucose, i-STAT (mg/dl)   Date Value   11/24/2017 171 (H)       Labs reviewed    Physical Examination:  Vitals:   Vitals:    12/17/17 1348 12/17/17 2100 12/18/17 0519 12/18/17 0847   BP: 154/72 167/77 141/78 134/62   Pulse: 57 56 (!) 53 (!) 54   Resp: 20 18 20    Temp: 98 4 °F (36 9 °C) 98 2 °F (36 8 °C) 98 3 °F (36 8 °C)    TempSrc: Oral Oral Oral    SpO2: 98% 97% 98%    Weight:       Height:           GEN: NAD  HEENT: NC/AT  RESP: BBS w/o crackles/wheeze/rhonci; resp unlabored  CV: +S1 S2, regular rate, no rubs/murmurs  ABD: soft, NT, ND, normal BS   : no villarreal  EXT: 1+ RLE edema = continue TEDS;  Right hip incision is clean and dry w/o any drainage/bleeding currently per nursing  Skin: no rashes  Neuro: AAO, limited movement of Right leg 2/2 pain/surgery otherwise rest of extremities 5/5      [ X ] Total time spent: 30 Mins and greater than 50% of this time was spent counseling/coordinating care        Katie Varner, Jessica Morse  Internal Medicine

## 2017-12-18 NOTE — PROGRESS NOTES
12/18/17 0700   Pain Assessment   Pain Assessment No/denies pain   Pain Score No Pain   Restrictions/Precautions   Precautions Bed/chair alarms; Fall Risk;Supervision on toilet/commode;THR   Weight Bearing Restrictions Yes   RLE Weight Bearing Per Order WBAT   ROM Restrictions Yes   RLE ROM Restriction Range Limitation  (THR)   QI: Eating   Assistance Needed Independent   Assistance Provided by [place] No physical assistance   Eating CARE Score 6   Eating Assessment   Food To Mouth Yes   Able To Cut Yes   Positioning Upright;Out of Bed   Meal Assessed Breakfast   Intake Mode PO;Self   Dentures Upper; Lower   Finishes Timely Yes   Opens Packages Yes   Eating (FIM) 6 - Patient requires increased time or safety concern   QI: Oral Hygiene   Assistance Needed Supervision   Assistance Provided by [place] No physical assistance   Oral Hygiene CARE Score 4   Grooming   Able To Initiate Tasks; Acquire Items; Wash/Dry Face;Brush/Clean Teeth;Wash/Dry Hands   Limitation Noted In Problem Solving; Safety;Strength   Findings Pt completed all grooming seated in chair due to balance deficits  Grooming (FIM) 5 - Patient requires supervision/monitoring   QI: Shower/Bathe Self   Assistance Needed Supervision   Assistance Provided by [place] No physical assistance   Shower/Bathe Self CARE Score 4   Bathing   Assessed Bath Style Shower   Anticipated D/C Bath Style Shower   Able to Shushan Sukhdeep No   Able to [employer] Temperature No   Able to Wash/Rinse/Dry (body part) Left Arm;Right Arm;L Upper Leg;R Upper Leg;L Lower Leg/Foot;R Lower Leg/Foot;Chest;Abdomen;Perineal Area; Buttocks   Limitations Noted in Balance; Endurance;Problem Solving;ROM;Safety;Strength   Positioning Seated;Standing   Adaptive Equipment Longhand Reacher; Shower [employer]   Findings  Pt given verbal cues for completing all bathing seated due to decreased balance and safety      Bathing (FIM) 5 - Patient requires supervision/monitoring but completes 10/10 parts   Tub/Shower Transfer   Limitations Noted In Safety   Adaptive Equipment Grab Bars;Seat with Back   Assessed Shower   Findings Pt utilized RW and grab bars for shower transfer  Shower Transfer (FIM) 5 - Patient requires supervision/monitoring   QI: Upper Body Dressing   Assistance Needed Supervision   Assistance Provided by [place] No physical assistance   Comment verbal cues for donning clothing seated   Upper Body Dressing CARE Score 4   QI: Lower Body Dressing   Assistance Needed Supervision   Assistance Provided by [place] No physical assistance   Lower Body Dressing CARE Score 4   QI: Putting On/Taking Off Footwear   Assistance Needed Adaptive equipment;Supervision   Assistance Provided by [place] No physical assistance   Putting On/Taking Off Footwear CARE Score 4   QI: Picking Up Object   Assistance Needed Adaptive equipment;Supervision   Assistance Provided by [place] No physical assistance   Comment using reacher    Picking Up Object CARE Score 4   Dressing/Undressing Clothing   Able to  Obtain Clothing;Store removed clothing   Remove UB Clothes Pullover Shirt   Remove LB Clothes [address]; Undergarment;TEDs; Shoes   Limitations Noted In Balance; Endurance;Problem Solving; Safety;Strength;ROM   Adaptive Equipment Reacher;Sock Aide   Positioning Supported Sit;Standing   Findings Pt educated on safety with clothing management and completing dressing while seated to increase safety      UB Dressing (FIM) 5 - [place] sets up supplies or applies device   LB Dressing (FIM) 5 - Patient requires supervision/monitoring   QI: Roll Left and Right   Assistance Needed Supervision   Assistance Provided by [place] No physical assistance   Roll Left and Right CARE Score 4   QI: Sit to [address] Provided by [place] No physical assistance   Sit to Lying CARE Score 4   QI: Lying to Sitting on Side of Bed   Assistance Needed Supervision Assistance Provided by [place] No physical assistance   Lying to Sitting on Side of Bed CARE Score 4   QI: Sit to [address] Provided by [place] No physical assistance   Sit to Stand CARE Score 4   Bed Mobility   Rolling R 5  Supervision   Rolling L 5  Supervision   Supine to Sit 5  Supervision   Sit to Supine 5  Supervision   QI: Chair/Bed-to-Chair Transfer   Assistance Needed Supervision   Assistance Provided by [place] No physical assistance   Comment RW   Chair/Bed-to-Chair Transfer CARE Score 4   Transfer Bed/Chair/Wheelchair   Positioning Concerns Cognition   Limitations Noted In Balance; Endurance;Problem Solving;LE Strength   Adaptive Equipment Roller Walker   Findings Pt with verbal cues for functional transfers due to decreased safety and balance  Bed, Chair, Wheelchair Transfer (FIM) 5 - Patient requires supervision/monitoring   QI: [address] Provided by [place] No physical assistance   Hector [address] 83 Score 4   Toileting   Able to [address] down yes, up yes  Able to Manage Clothing Closures Yes   Manage Hygiene Bladder   Limitations Noted In Balance;ROM;Safety;LE Strength   Findings Pt required close supervision with standing for toileting  Toileting (FIM) 5 - Patient requires supervision/monitoring   QI: Toilet Transfer   Assistance Needed Supervision   Assistance Provided by [place] No physical assistance   Comment standing for toileting    Toilet Transfer CARE Score 4   Toilet Transfer   Surface Assessed Standard Toilet   Transfer Technique Standard   Limitations Noted In Balance; Endurance   Adaptive Equipment Grab Bar   Positioning Concerns Safety   Toilet Transfer (FIM) 5 - Patient requires supervision/monitoring   Cognition   Overall Cognitive Status Impaired   Arousal/Participation Alert; Cooperative   Attention Attends with cues to redirect   Orientation Level Oriented X4   Memory Decreased short term memory;Decreased recall of recent events;Decreased recall of precautions   Following Commands Follows one step commands with increased time or repetition   Activity Tolerance   Activity Tolerance Patient tolerated treatment well   Assessment   Treatment Assessment Pt session focused on ADLs with setup and supervision provided to increase overall safety  Pt was educated on safety with clothing managememt and bathing to remain seated due to decreased balance  Pt completed self care using [address] for LB due to hip precautions  Pt tolerated session well with close supervision and extra time given for completing all tasks I  Pt denies any questions or concerns prior to discharge and will require 24H supervision to increase overall safety due to decreased balance, endurance and safety  Prognosis Good   Problem List Decreased strength;Decreased range of motion;Decreased endurance; Impaired balance;Decreased mobility; Decreased cognition; Impaired judgement;Decreased safety awareness;Orthopedic restrictions   Barriers to Discharge Decreased caregiver support   Plan   Treatment/Interventions ADL retraining;Functional transfer training;LE strengthening/ROM; Therapeutic exercise; Endurance training;Cognitive reorientation   Progress Progressing toward goals   Recommendation   OT Discharge Recommendation 24 hour supervision/assist   OT Therapy Minutes   OT Time In 0700   OT Time Out 0830   OT Total Time (minutes) 90   OT Mode of treatment - Individual (minutes) 90   OT Mode of treatment - Concurrent (minutes) 0   OT Mode of treatment - Group (minutes) 0   OT Mode of treatment - Co-treat (minutes) 0   OT Mode of Teatment - Total time(minutes) 90 minutes   Therapy Time missed   Time missed?  No

## 2017-12-18 NOTE — DISCHARGE INSTRUCTIONS
FOR SNF PHYSICIANS        Patient is a 79 yo male s/p revision of Rt MAREN by Dr Ama Weinstein on 11/20/17 p/w stroke like symptoms and was given TPA however developed Rt thigh/gluteal hematoma; course complicated by LUE DVT        Pain: currently on tylenol ATC, intolerant to 5 mg Oxy IR per family (excessive sedation), allergy to dilaudid; trialed tramadol in the past prior to surgery for hip pain without AE (no tramadol used since 12/10/17)      Rt MAREN revison: performed on 11/20/17 by Dr Ama Weinstein, d/w Dr Ama Weinstein and due to hematoma Dr Ama Weinstein recommended delay staple removal from 2 weeks to 3 weeks post-op, contacted Dr Ama Weinstein at 3 wks post-op and agreeable to staple removal which was done at that time; surgical site seen on day of dc--> C/D/I, no erythema, no drainage since the morning of 12/15/17; hip precautions & WBAT per ortho and confirmed with Dr Ama Weinstein; OP FU with Dr Ama Weinstein on 12/27/17 10:00 [address] [place] [address] office     LUE DVT: coumadin; noted to have mildly decreased AT III level at 88 (LLN 92), PTT-LA mixing study prolonged at 53 sec (ULN 48 9), and hexagonal phase phospholipid elevated at 16 sec (ULN 16) and + Lupus anticoagulant PTT AT 59 8 sec (ULN 51 9; per lab recs this is to be repeated in 12 wks to confirm); FU with Dr Michael Reveles to arrange FU testing and management of AC as OP; SNF physicians please monitor INR and adjust coumadin accordingly in the interim, INR currently 2 78     Rt thigh/gluteal collection: after some bloody drainage the morning of 12/15/17 no further drainage;  Hg stable at 9 0 on 12/18/17; repeat CT showed slightly increased in size of collection from 3 8 x 4 2 x 11 cm to 5 2 x 4 0 x 14 cm however per radiology report not felt to be consistent with hematoma rather seroma/lymphocele vs abscess however given that surgical site is C/D/I and lack of fever, leukocytosis, erythema, and no drainage (bloody) since 12/15/17 this is less likely therefore after d/w orthopedic consultation and Dr Garfield Peoples and all felt patient to be stable for dc to SNF w/o aspiration of collection; d/w patient & son risks/benefits of this plan of care and they are in agreement to dc to SNF w/o aspiration of collection; cleared by surgery to be on coumadin and aspirin 325 mg QD; OP FU with Dr Garfield Peoples on 12/27/17 10:00 [facility] office; SNF physicians please monitor CBC w/diff, temps, and surgical site to monitor for abscess formation     Likely CVA: per neurology cleared to dc home plavix and instead increase home ASA 81 mg QD to   mg QD (due to non-response to ASA 81 mg QD per API) with AC and once AC stopped at Dr Ava Vera discretion in 6 months neurology recommends dual AP therapy; API of 12/13/17 shows therapeutic response to  mg daily; patient with allergy to statins (myalgia not hives per patient); OP FU with [facility]     CAD s/p stents: per cards cleared to dc home plavix and instead increase home ASA 81 mg QD to  mg QD due to non-response to ASA 81 mg QD per API for stroke prevention however API of 12/13/17 did show therapeutic response to  mg qdaily (in addition once [facility] stopped at Dr Ava Vera discretion neurology recommends dual AP therpy); on BB, patient with allergy to statins (myalgia not hives per patient); FU with Dr Lion Madrid     DM: per OP records patient take glyburide 10 mg QAM and 5 mg QPM however given CKD will defer to IM regarding risk/benefits of this agent in patient with stable CKD versus switch to short-acting sulfonylureas; currently on glyburide 2 5 mg QAM and ISS, per IM     HTN: on home regimen of cardura 4 mg qd (also for BPH), norvasc 10 mg qdaily, lisinopril 40 mg qd, however home toprol XL 25 mg Q12 has been switched to lopressor 100 mg q12     elevated PSA/BPH/incontinence: on home cardura 4 mg qd; was also on oxybutynin 5 mg qd at home which has not yet been restarted; monitoring for incontinence; not currently c/o incontinence however per patient is having frequency at night, urology consulted and recommended check PVR (64, 18, 12) and UA (which did not reveal evidence of UTI) therefore recommend no further testing/treatment at this time only OP FU with Dr Aleja Power      basal cell carcinoma: FU with Dr Yamel Rush   PAD (B/L renal artery stenosis, & celiac trunk stenosis): follows with Dr Anna Palma, currently on AP for CAD and possible CVA, allergy to statins (myalgia not hives)    Vitamin D insufficiency: 20 4 (LLN 30) on 12/6/17, started on ergocalciferol 50,000 units Qweekly x 6 doses starting 12/6/17 with further testing of vitamin D level and/or vitamin D supplementation at discretion of SNF physicians/PCP; (next dose due 12/20/17, 4 doses remain)     Hyponatremia: mild at 135 (); SNF physicians please monitor BMP   CKD: baseline Cr 1 5-1 7, currently 1 37, FU with Dr Rohan Cardenas; SNF physicians please monior BMP with GFR  Anemia: ABLA with likely component of AOCD (CKD);  Hg currently stable at 9 0; SNF physicians please monitor CBC w/diff       Incidental lab findings: hyperparathyroidism (noted to be mild elevated above ULN of 72 at 78 9 on 1/18/17 likely 2/2 to CKD, on supplemental Vit D, OP FU with Dr Rohan Cardenas), decreased Alk Phos/elevated total/direct bilirubin (sample hemolyzed, OP FU with PCP/SNF physicians for repeat testing and/or specialist referral at PCPs/SNF physicians discretion), elevated homocysteine level (mildly elevated above ULN of 14 2 at 15 1, started on a folic PAKM/H1/W79 tablet in acute care; OP FU w/ Dr Joseph Cristobal for B6/B12/Folate testing at Dr Janeth Ortega discretion and OP FU with patient's own cardiologist Dr Ana Rangel)      Incidental findings of EKG of 11/26/17 15:17 per Dr Joseph Ladd interpretation: PVCs, RBBB/wide QRS, borderline [place] interval (200) and prolonged QTc; evaluated and cleared from cards standpoint (seen by Dr Joseph Ladd on 11/26 and Dr Milvia Echevarria on 11/27) in acute care; OP FU with Dr Yolande Alcala      Incidental findings of echo (11/25): very mild AS, aortic root dilation/ascending aorta dilation; OP FU with Dr Yolande Alcala     Incidental findings noted on CT A/P (11/30) & CT C/A/P (11/24): L renal lesions (noted to be stable renal cysts that appear to be simple on U/S of 11/28, OP FU with Dr Jeovanny Maria), hiatal hernia (no c/o of GERD/reflux symptoms, OP FU with PCP/SNF physicians), sebaceous cyst anterior to the sternum (OP FU with PCP/SNF physicians and/or Dr Rhonda Brown)      Other incidental findings: 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

## 2017-12-18 NOTE — DISCHARGE SUMMARY
Discharge Summary - PMR       Admission Date:    12/5/17  Discharge Date:   12/18/17  Diagnosis:   Rt MAREN revision, DVT   Comorbidities:   See below for medical details     Hospital Course: The patient had an unremarkable rehab course with significant functional gains made, please see below for medical details:        Patient is a 79 yo male s/p revision of Rt MAERN by Dr Aly Calhoun on 11/20/17 p/w stroke like symptoms and was given TPA however developed Rt thigh/gluteal hematoma; course complicated by LUE DVT      Pain: currently on tylenol ATC, intolerant to 5 mg Oxy IR per family (excessive sedation), allergy to dilaudid; trialed tramadol in the past prior to surgery for hip pain without AE (no tramadol used since 12/10/17)      Rt MAREN revison: performed on 11/20/17 by Dr Aly Calhoun, d/w Dr Aly Calhoun and due to hematoma Dr Aly Calhoun recommended delay staple removal from 2 weeks to 3 weeks post-op, contacted Dr Aly Calhoun at 3 wks post-op and agreeable to staple removal which was done at that time; surgical site seen on day of dc--> C/D/I, no erythema, no drainage since the morning of 12/15/17; hip precautions & WBAT per ortho and confirmed with Dr Aly Calhoun; OP FU with Dr Aly Calohun on 12/27/17 10:00 [address] [place] [address] office     LUE DVT: coumadin; noted to have mildly decreased AT III level at 88 (LLN 92), PTT-LA mixing study prolonged at 53 sec (ULN 48 9), and hexagonal phase phospholipid elevated at 16 sec (ULN 16) and + Lupus anticoagulant PTT AT 59 8 sec (ULN 51 9; per lab recs this is to be repeated in 12 wks to confirm); FU with Dr Joseph Cristobal to arrange FU testing and management of AC as OP; INR to be monitored by SNF physicians in the interim INR currently 2 78     Rt thigh/gluteal collection: after some bloody drainage the morning of 12/15/17 no further drainage;  Hg stable at 9 0 on 12/18/17; repeat CT showed slightly increased in size of collection from 3 8 x 4 2 x 11 cm to 5 2 x 4 0 x 14 cm however per radiology report not felt to be consistent with hematoma rather seroma/lymphocele vs abscess however given that surgical site is C/D/I and lack of fever, leukocytosis, erythema, and no drainage (bloody) since 12/15/17 this is less likely therefore after d/w orthopedic consultation and Dr Zuleima Yoder and all felt patient to be stable for dc to SNF w/o aspiration of collection; d/w patient & son risks/benefits of this plan of care and they are in agreement to dc to SNF w/o aspiration of collection; cleared by surgery to be on coumadin and aspirin 325 mg QD; OP FU with Dr Zuleima Yoder on 12/27/17 10:00 [facility] office; SNF physicians to monitor CBC w/diff, temps, and surgical site to monitor for abscess formation     Likely CVA: per neurology cleared to dc home plavix and instead increase home ASA 81 mg QD to   mg QD (due to non-response to ASA 81 mg QD per API) with AC and once AC stopped at Dr Mani Schuster discretion in 6 months neurology recommends dual AP therapy; API of 12/13/17 shows therapeutic response to  mg daily; patient with allergy to statins (myalgia not hives per patient); [facility] F[facility] with [facility]     CAD s/p stents: per cards cleared to dc home plavix and instead increase home ASA 81 mg QD to  mg QD due to non-response to ASA 81 mg QD per API for stroke prevention however API of 12/13/17 did show therapeutic response to  mg qdaily (in addition once [facility] stopped at Dr Mani Schuster discretion neurology recommends dual AP therpy); on BB, patient with allergy to statins (myalgia not hives per patient); FU with Dr Carlee Steven     DM: per OP records patient take glyburide 10 mg QAM and 5 mg QPM however given CKD will defer to IM regarding risk/benefits of this agent in patient with stable CKD versus switch to short-acting sulfonylureas; currently on glyburide 2 5 mg QAM and ISS, per IM     HTN: on home regimen of cardura 4 mg qd (also for BPH), norvasc 10 mg qdaily, lisinopril 40 mg qd, however home toprol XL 25 mg Q12 has been switched to lopressor 100 mg q12     elevated PSA/BPH/incontinence: on home cardura 4 mg qd; was also on oxybutynin 5 mg qd at home which has not yet been restarted; monitoring for incontinence; not currently c/o incontinence however per patient is having frequency at night, urology consulted and recommended check PVR (64, 18, 12) and UA (which did not reveal evidence of UTI) therefore recommend no further testing/treatment at this time only OP FU with Dr Viv Helton      basal cell carcinoma: FU with Dr Caren Hammans   PAD (B/L renal artery stenosis, & celiac trunk stenosis): follows with Dr Janine Kinney, currently on AP for CAD and possible CVA, allergy to statins (myalgia not hives)    Vitamin D insufficiency: 20 4 (LLN 30) on 12/6/17, started on ergocalciferol 50,000 units Qweekly x 6 doses starting 12/6/17 with further testing of vitamin D level and/or vitamin D supplementation at discretion of SNF physicians/PCP; (next dose due 12/20/17, 4 doses remain)     Hyponatremia: mild at 135 (); [facility] physicians to monitor BMP   CKD: baseline Cr 1 5-1 7, currently 1 37, FU with Dr Donato Rivera; [facility] physicians to monior BMP with GFR  Anemia: ABLA with likely component of AOCD (CKD);  Hg currently stable at 9 0; [facility] physicians to monitor CBC w/diff     Dispo: [facility]     Incidental lab findings: hyperparathyroidism (noted to be mild elevated above ULN of 72 at 78 9 on 1/18/17 likely 2/2 to CKD, on supplemental Vit D, OP FU with Dr Donato Rivera), decreased Alk Phos/elevated total/direct bilirubin (sample hemolyzed, OP FU with PCP/SNF physicians for repeat testing and/or specialist referral at PCPs/SNF physicians discretion), elevated homocysteine level (mildly elevated above ULN of 14 2 at 15 1, started on a folic OWYQ/U8/C94 tablet in acute care; OP FU w/ Dr Ordoñez Staff for B6/B12/Folate testing at Dr Víctor Goodwin discretion and OP FU with patient's own cardiologist Dr Milvia Del Rosario)    Incidental findings of EKG of 11/26/17 15:17 per Dr Gudelia Kwon interpretation: PVCs, RBBB/wide QRS, borderline [place] interval (200) and prolonged QTc; evaluated and cleared from cards standpoint (seen by Dr Gudelia Kwon on 11/26 and Dr John Thomas on 11/27) in acute care; OP FU with Dr Carlee Steven      Incidental findings of echo (11/25): very mild AS, aortic root dilation/ascending aorta dilation; OP FU with Dr Carlee Steven     Incidental findings noted on CT A/P (11/30) & CT C/A/P (11/24): L renal lesions (noted to be stable renal cysts that appear to be simple on U/S of 11/28, OP FU with Dr Chinmay Silverio), hiatal hernia (no c/o of GERD/reflux symptoms, OP FU with PCP/SNF physicians), sebaceous cyst anterior to the sternum (OP FU with PCP/SNF physicians and/or Dr Mortimer Pines)      Other incidental findings: 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

## 2017-12-18 NOTE — PROGRESS NOTES
Progress Note - Yessy Pineda [de-identified] y o  male MRN: [id number]    Unit/Bed#: [facility] 203-79 Encounter: [id number]            Subjective:   D/W patient and son clinical findings on repeat CT and CBC to monitor fluid collection; both are in agreement with dc to SNF with monitoring at SNF by physicians there for abscess development w/o aspiration of collection after discussion of risks/benefits     Objective:     ROS  Gen: denies recent wt loss   Psych: denies mood change    T: 98 3  RR: 20  POx: 98%  BP: 134/62  HR: 54 (asymptomatic)       Physical Exam:     Gen:        NAD   Neck:   trachea midline  Lungs:  respirations unlabored   Heart:    + S1 and S2   Abdomen:    Soft, non-tender  Psych: mood/affect appropriate  Neurologic: awake, alert, appropriately answering questions  Skin: surgical site C/D/I, no drainage, no erythema       Functional :  Mobility: sup  Tx: sup  ADLs: sup         acetaminophen 975 mg Oral Q8H [facility]   amLODIPine 10 mg Oral Daily   aspirin 324 mg Oral Daily   ergocalciferol 50,000 Units Oral Weekly   Followed by      Reena Benavides ON 1/17/2018] cholecalciferol 1,000 Units Oral Daily   doxazosin 4 mg Oral Daily   folic acid-pyridoxine-cyanocobalamin 1 tablet Oral Daily   glyBURIDE 2 5 mg Oral Daily With Breakfast   insulin lispro 1-5 Units Subcutaneous TID AC   insulin lispro 1-5 Units Subcutaneous HS   lisinopril 40 mg Oral Daily   metoprolol tartrate 100 mg Oral Q12H [facility]   polyethylene glycol 17 g Oral Daily   warfarin 5 mg Oral Daily (warfarin)       bisacodyl    magnesium hydroxide    traMADol      Assessment:  Patient is a 79 yo male s/p revision of Rt MAREN by Dr Mata Zaldivar on 11/20/17 p/w stroke like symptoms and was given TPA however developed Rt thigh/gluteal hematoma; course complicated by LUE DVT    Plan:    Rehabilitation: cont PT/OT for ambulatory/ADL dysfxn    Pain: currently on tylenol ATC, intolerant to 5 mg Oxy IR per family (excessive sedation), allergy to dilaudid; trialed tramadol in the past prior to surgery for hip pain without AE (no prn tramadol used since 12/10)      Rt MAREN revison: performed on 11/20/17 by Dr Marin Alfaro, d/w Dr Marin Alfaro and due to hematoma Dr Marin Alfaro recommended delay staple removal from 2 weeks to 3 weeks post-op, contacted Dr Marin Alfaro at 3 wks post-op and agreeable to staple removal which was done at that time; surgical site seen on day of dc--> C/D/I, no erythema, no drainage since the morning of 12/15/17; hip precautions & WBAT per ortho and confirmed with Dr Marin Alfaro; OP FU with Dr Marin Alfaro on 12/27/17 10:00 [facility] office     LUE DVT: coumadin; noted to have mildly decreased AT III level at 88 (LLN 92), PTT-LA mixing study prolonged at 53 sec (ULN 48 9), and hexagonal phase phospholipid elevated at 16 sec (ULN 16) and + Lupus anticoagulant PTT AT 59 8 sec (ULN 51 9; per lab recs this is to be repeated in 12 wks to confirm); FU with Dr Neli Harris to arrange FU testing and management of AC as OP; INR to be monitored by SNF physicians in the interim INR currently 2 78     Rt thigh/gluteal collection: after some bloody drainage the morning of 12/15/17 no further drainage;  Hg stable at 9 0 on 12/18/17; repeat CT showed slightly increased in size of collection from 3 8 x 4 2 x 11 cm to 5 2 x 4 0 x 14 cm however per radiology report not felt to be consistent with hematoma rather seroma/lymphocele vs abscess however given that surgical site is C/D/I and lack of fever, leukocytosis, erythema, and no drainage (bloody) since 12/15/17 this is less likely therefore after d/w orthopedic consultation and Dr Marin Alfaro and all felt patient to be stable for dc to SNF w/o aspiration of collection; d/w patient & son risks/benefits of this plan of care and they are in agreement to dc to SNF w/o aspiration of collection; cleared by surgery to be on coumadin and aspirin 325 mg QD; OP FU with Dr Marin Alfaro on 12/27/17 10:00 [address] [place] [address] office; [facility] physicians to monitor CBC w/diff, temps, and surgical site to monitor for abscess formation     Likely CVA: per neurology cleared to dc home plavix and instead increase home ASA 81 mg QD to   mg QD (due to non-response to ASA 81 mg QD per API) with AC and once AC stopped at Dr Kristyn Urban discretion in 6 months neurology recommends dual AP therapy; API of 12/13/17 shows therapeutic response to  mg daily; patient with allergy to statins (myalgia not hives per patient); [facility] FU with [facility]     CAD s/p stents: per cards cleared to dc home plavix and instead increase home ASA 81 mg QD to  mg QD due to non-response to ASA 81 mg QD per API for stroke prevention however API of 12/13/17 did show therapeutic response to  mg qdaily (in addition once [facility] [place] stopped at Dr Kristyn Urban discretion neurology recommends dual AP therpy); on BB, patient with allergy to statins (myalgia not hives per patient); FU with Dr Orville José     DM: per OP records patient take glyburide 10 mg QAM and 5 mg QPM however given CKD will defer to IM regarding risk/benefits of this agent in patient with stable CKD versus switch to short-acting sulfonylureas; currently on glyburide 2 5 mg QAM and ISS, per IM     HTN: on home regimen of cardura 4 mg qd (also for BPH), norvasc 10 mg qdaily, lisinopril 40 mg qd, however home toprol XL 25 mg Q12 has been switched to lopressor 100 mg q12     elevated PSA/BPH/incontinence: on home cardura 4 mg qd; was also on oxybutynin 5 mg qd at home which has not yet been restarted; monitoring for incontinence; not currently c/o incontinence however per patient is having frequency at night, urology consulted and recommended check PVR (64, 18, 12) and UA (which did not reveal evidence of UTI) therefore recommend no further testing/treatment at this time only OP FU with Dr Summer Mcmanus      basal cell carcinoma: FU with Dr Vickye Boast   PAD (B/L renal artery stenosis, & celiac trunk stenosis): follows with Dr Jose Roberto Huang, currently on AP for CAD and possible CVA, allergy to statins (myalgia not hives)    Vitamin D insufficiency: 20 4 (LLN 30) on 12/6/17, started on ergocalciferol 50,000 units Qweekly x 6 doses starting 12/6/17 with further testing of vitamin D level and/or vitamin D supplementation at discretion of SNF physicians/PCP (next dose due 12/20/17, 4 doses remain)     Hyponatremia: mild at 135 (); [facility] physicians to monitor BMP   CKD: baseline Cr 1 5-1 7, currently 1 37, FU with Dr Aly Austin; SNF physicians to monior BMP with GFR  Anemia: ABLA with likely component of AOCD (CKD);  Hg currently stable at 9 0; [facility] physicians to monitor CBC w/diff     Dispo: SNF     Incidental lab findings: hyperparathyroidism (noted to be mild elevated above ULN of 72 at 78 9 on 1/18/17 likely 2/2 to CKD, on supplemental Vit D, OP FU with Dr Aly Austin), decreased Alk Phos/elevated total/direct bilirubin (sample hemolyzed, OP FU with PCP/SNF physicians for repeat testing and/or specialist referral at PCPs/SNF physicians discretion), elevated homocysteine level (mildly elevated above ULN of 14 2 at 15 1, started on a folic IHYO/I2/M82 tablet in acute care; OP FU w/ Dr Louisa Ruiz for B6/B12/Folate testing at Dr Kristyn Urban discretion and OP FU with patient's own cardiologist Dr Orville José)      Incidental findings of EKG of 11/26/17 15:17 per Dr Jabier Acevedo interpretation: PVCs, RBBB/wide QRS, borderline [place] interval (200) and prolonged QTc; evaluated and cleared from cards standpoint (seen by Dr Jabier Acevedo on 11/26 and Dr Vel Landers on 11/27) in acute care; OP FU with Dr Orville José      Incidental findings of echo (11/25): very mild AS, aortic root dilation/ascending aorta dilation; OP FU with Dr Orville José     Incidental findings noted on CT A/P (11/30) & CT C/A/P (11/24): L renal lesions (noted to be stable renal cysts that appear to be simple on U/S of 11/28, OP FU with Dr Aly Austin), hiatal hernia (no c/o of GERD/reflux symptoms, OP FU with PCP/SNF physicians), sebaceous cyst anterior to the sternum (OP FU with PCP/SNF physicians and/or Dr Eamon Dunne)      Other incidental findings: 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

## 2017-12-18 NOTE — PROGRESS NOTES
12/18/17 1001   Pain Assessment   Pain Assessment No/denies pain   Pain Score No Pain   Restrictions/Precautions   Precautions Bed/chair alarms; Fall Risk;THR   Weight Bearing Restrictions Yes   RLE Weight Bearing Per Order WBAT   ROM Restrictions Yes   RLE ROM Restriction Range Limitation  (MAREN)   Cognition   Overall Cognitive Status Impaired   Arousal/Participation Alert; Cooperative   Attention Attends with cues to redirect   Orientation Level Oriented X4   Memory Decreased short term memory;Decreased recall of recent events;Decreased recall of precautions   Following Commands Follows one step commands with increased time or repetition   Subjective   Subjective states he is tired but willing to participate   QI: Sit to [address] to Stand CARE Score 4   Transfer Bed/Chair/Wheelchair   Stand Pivot Supervision   Sit to Stand Supervision   Stand to Sit Supervision   Car Transfer Supervision   All Transfer Supervision   Findings S   Bed, Chair, Wheelchair Transfer (FIM) 5 - Patient requires supervision/monitoring   QI: Car Transfer   Assistance Needed Supervision   Car Transfer CARE Score 4   QI: Walk 10 Feet   Assistance Needed Supervision   Walk 10 Feet CARE Score 4   QI: Walk 50 Feet with Two Turns   Assistance Needed Supervision   Walk 50 Feet with Two Turns CARE Score 4   QI: Walk 150 Feet   Assistance Needed Supervision   Walk 150 Feet CARE Score 4   Ambulation   Does the patient walk? 2  Yes   Primary Discharge Mode of Locomotion Walk   Walk Assist Level Supervision   Gait Pattern Inconsistant Ally;Decreased foot clearance; Forward Flexion;Narrow RADHA; Poor UE WB;Step through; Decreased R stance; Improper weight shift   Assist Device Suze Briceñoa Len Walked (feet) 350 ft   Limitations Noted In Heel Strike;Posture;Speed;Strength;Swing   Walking (FIM) 5 - Patient requires supervision/monitoring AND distance 150 feet or more, no rest   QI: 1 Step (Curb)   Assistance Needed Supervision   1 Step (Curb) CARE Score 4   QI: 4 Steps   Assistance Needed Supervision   Comment BHR   4 Steps CARE Score 4   QI: 12 Steps   Assistance Needed Supervision   Comment BHR   12 Steps CARE Score 4   Stairs   Type Stairs;Curb;Ramp  (6)   # of Steps 12   Weight Bearing Precautions RLE;WBAT   Assist Devices Bilateral Rail   Findings 6" trainers   Stairs (FIM) 5 - Patient requires supervision/monitoring AND goes up and down full flight (12- 14 stairs)   QI: Toilet Transfer   Assistance Needed Supervision   Comment RW   Toilet Transfer CARE Score 4   Toilet Transfer   Surface Assessed Standard Toilet   Limitations Noted In [address] Bar   Positioning Concerns Safety   Toilet Transfer (FIM) 5 - Patient requires supervision/monitoring   Therapeutic Interventions   Strengthening LAQ, marches, abduction all with 2# CW 10x2, adduction yellow ball 10x2   Flexibility stretching quad on RLE   Assessment   Treatment Assessment pt demonstrates forward flexion during amb but states he has always walked that way, ambs with short steps and ER foot on RLE; pt demonstrates increased strength and endurance throughout session; pt goes up and comes down steps step over step using BHR; pt performs toileting activities with S and a RW; pt is tentatively scheduled for dc this afternoon; continue POC as per PT   Problem List Decreased strength;Decreased range of motion;Decreased endurance; Impaired balance;Decreased mobility; Decreased cognition; Impaired judgement;Decreased safety awareness;Orthopedic restrictions   Barriers to Discharge Decreased caregiver support   PT Barriers   Physical Impairment Decreased strength;Decreased endurance; Impaired balance;Decreased mobility; Decreased safety awareness;Orthopedic restrictions   Functional Limitation Walking   Plan   Treatment/Interventions Functional transfer training;LE strengthening/ROM; Elevations; Therapeutic exercise; Endurance training;Cognitive reorientation;Patient/family training;Equipment eval/education; Bed mobility   Progress Progressing toward goals   Recommendation   Recommendation 24 hour supervision/assist   Equipment Recommended Walker   PT Therapy Minutes   PT Time In 1001   PT Time Out 1101   PT Total Time (minutes) 60   PT Mode of treatment - Individual (minutes) 60   PT Mode of treatment - Concurrent (minutes) 0   PT Mode of treatment - Group (minutes) 0   PT Mode of treatment - Co-treat (minutes) 0   PT Mode of Teatment - Total time(minutes) 60 minutes   Therapy Time missed   Time missed?  No

## 2017-12-19 NOTE — OCCUPATIONAL THERAPY NOTE
Occupational Therapy Discharge Summary    Pt d/c to subacute rehab for continued skilled therapy to maximize fxnl indep and safety  Pt presenting with THP and dec balance/strength/and safety  Pt scoring an overall 12/30 on MOCA indicating Moderate cognitive impairment and a 4 4/6 0 on ACLS  See recommendations based upon cognitive scores below  24/7 S recommended 2* cognitive deficits and pt's poor carryover of safety strategies  Pt's family aware however they report they are unable to provide at this time  Pt d/c overall at S level for ADL fxn/xfers and cognition with inc cues req for problem solving and comprehension/memory  4 4 (ACLS)    Administered Chelsi Killer Cognitive Level Screen (ACLS)  Pt scored 4 4/6 0 indicating it is recommended that the person live with someone who does a daily check on the environment to remove any safety hazards and solve problems when minor changes in the home occur  The person may be alone for part of the day with a pre-established procedure for getting help from a neighbor  Behavior:  Knows day/date  Follows routine sequence of activities  Will learn schedule and follow daily routine  Hazards are not anticipated  Memorization is slow  Will need long term repetitive training for all new tasks  May become upset if routine is altered  May seek assist if lost   Do not expect to be aware of needs of others  May need reminders to keep appointments  Grooming:  Dailey, brushes and styles hair  Applies makeup  May insist on familiar products  Finds supplies in familiar locations  May be unable to master use of new tools  Hazards are not anticipated  Dressing: Finds garments in familiar locations  Initiates dressing at usual times  Selects familiar combinations of outfits and may wear same outfit over and over  Resists new combinations  May fail to consider weather conditions, season or occasion when selecting clothing    May argue with suggested corrections of errors  Bathing:  Initiates bath/shower at customary times and follows typical routine  May collect supplies from a familiar location  May not vary rate  May want to use same products  May use excessive amounts of product  May have difficulty opening small or unusual containers  May leave towels on floor  Protect from unseen hazards (wet floors, electrical appliances near water)  Walking/exercising:  Ambulates within fitness capacity in neighborhood  Chooses to go by familiar routes  May fail to attend to signs or activities outside visual field  Needs new route identified by others and may learn after several weeks of practice  May become anxious in high stimulus environments (malls, airports, casinos)  Eating: May notice and clean highly visible dropped food items  May not be able to eat and converse at the same time  May resist changes in diet and menu  Watch handling of hot foods/liquids and heavy items  Toileting: Follows a routine of toileting in a familiar environment  Needs to have public rest rooms pointed out  May use too much paper  Does not consider needs of others  May spend excessive time in rest room  Medications: May follow routine rigidly and resist changes in prescriptions based on erroneous beliefs of effects  Does not note adverse side effects  Does not anticipate need to renew prescriptions  May be able to open child proof containers  Can use DAILY pill box marked with day/time (filled by another person)  Use of adaptive equipment:  May be trained to use adaptive equipment that requires a sequence of familiar actions  Does not anticipate hazards  Once learned, may resist changes in equipment  May abandon use of assistive device or use in unsafe manner  Housekeeping:  Sweeps, polishes and puts away objects to match previous performance  Fails to see dirt or dust in corners  May use excessive amounts of , polish or water    May resist changes in routine or products used  May fail to differentiate between similar cleaning products  Food preparation:  May follow a fixed diet and go hungry if usual food items are not available  Does not check inventory and may run out of essentials frequently  Does not consider nutritional needs  Goes to familiar restaurants of grocery stores  Is able to prepare simple hot/cold dishes  Spending money:  Manages routine purchases for immediate needs  May count cash very slowly  May use credit cards or checks  May need assistance for making monthly budget  May not remember to account for taxes or tips  Can manage a daily allowance  Shopping:  Goes to familiar stores for routine items  Does not compare product prices or quality  May not consider cost of item in relation to overall budget  May overspend  Laundry:  Initiates and completes laundry routine at usual time  May sort items by color  May follow schedule rigidly  May forget to clean lint traps  May forget to turn iron off  Traveling:  Needs new routes and travel procedures identified by others  May express interest in driving a car but fails to attend to all environmental cues to do so safely  May not allow adequate time for travel  Telephone:  Writes down messages and new numbers slowly  May attempt to use an address book  May make calls at odd hours without consideration of others schedule or cost of call  May run up large telephone bills  Driving: Should NOT operate a motor vehicle

## 2017-12-19 NOTE — CASE MANAGEMENT
Team dc summary - pt made good progress but was not going to have supervision at home  Family opted for contd subacute rehab and [facility] accepted  Pt's son Rhys Brito provided transport  Pt family facility and staff aware of all plans

## 2017-12-20 NOTE — PHYSICAL THERAPY NOTE
PT D/C SUMMARY      Pt progressed to S level for functional mobility with use of RW  Pt presented w/deficits in standing balance, righting reactions, BLE strength and pain, and benefited from use of RW  Pt presented w/deficits in cognition in regards to memory, problem solving, carryover of following through on THPs and scored a 4 4 on ACLS with OT, and presented with cognitive deficits according to the [address] [place] [address], Ne as well  Therefore, it was not recommended that pt d/c home alone, as he had difficulty performing ADL/IADL tasks to mimic his home setup without S and VC  Pt's family unable to provide this A at this time; pt d/c to SNF for further rehab  Pt will cont to benefit from skilled PT to make further gains in strength, balance, activity tolerance and to dec need for RW

## 2017-12-27 ENCOUNTER — GENERIC CONVERSION - ENCOUNTER (OUTPATIENT)
Dept: INTERNAL MEDICINE CLINIC | Facility: CLINIC | Age: 82
End: 2017-12-27

## 2018-01-05 ENCOUNTER — ALLSCRIPTS OFFICE VISIT (OUTPATIENT)
Dept: OTHER | Facility: OTHER | Age: 83
End: 2018-01-05

## 2018-01-07 DIAGNOSIS — E11.9 TYPE 2 DIABETES MELLITUS WITHOUT COMPLICATIONS (HCC): ICD-10-CM

## 2018-01-07 DIAGNOSIS — N18.30 CHRONIC KIDNEY DISEASE, STAGE III (MODERATE) (HCC): ICD-10-CM

## 2018-01-07 DIAGNOSIS — L98.9 DISORDER OF SKIN OR SUBCUTANEOUS TISSUE: ICD-10-CM

## 2018-01-07 DIAGNOSIS — I35.9 NONRHEUMATIC AORTIC VALVE DISORDER: ICD-10-CM

## 2018-01-07 DIAGNOSIS — I12.9 HYPERTENSIVE CHRONIC KIDNEY DISEASE WITH STAGE 1 THROUGH STAGE 4 CHRONIC KIDNEY DISEASE, OR UNSPECIFIED CHRONIC KIDNEY DISEASE: ICD-10-CM

## 2018-01-07 DIAGNOSIS — I25.10 ATHEROSCLEROTIC HEART DISEASE OF NATIVE CORONARY ARTERY WITHOUT ANGINA PECTORIS: ICD-10-CM

## 2018-01-07 DIAGNOSIS — G45.8 OTHER TRANSIENT CEREBRAL ISCHEMIC ATTACKS AND RELATED SYNDROMES: ICD-10-CM

## 2018-01-09 ENCOUNTER — LAB REQUISITION (OUTPATIENT)
Dept: LAB | Facility: HOSPITAL | Age: 83
End: 2018-01-09
Payer: MEDICARE

## 2018-01-09 DIAGNOSIS — I25.10 ATHEROSCLEROTIC HEART DISEASE OF NATIVE CORONARY ARTERY WITHOUT ANGINA PECTORIS: ICD-10-CM

## 2018-01-09 LAB
ALBUMIN SERPL BCP-MCNC: 3.2 G/DL (ref 3.5–5)
ALP SERPL-CCNC: 92 U/L (ref 46–116)
ALT SERPL W P-5'-P-CCNC: 24 U/L (ref 12–78)
ANION GAP SERPL CALCULATED.3IONS-SCNC: 5 MMOL/L (ref 4–13)
AST SERPL W P-5'-P-CCNC: 17 U/L (ref 5–45)
BILIRUB SERPL-MCNC: 0.45 MG/DL (ref 0.2–1)
BUN SERPL-MCNC: 28 MG/DL (ref 5–25)
CALCIUM SERPL-MCNC: 8.9 MG/DL (ref 8.3–10.1)
CHLORIDE SERPL-SCNC: 103 MMOL/L (ref 100–108)
CO2 SERPL-SCNC: 29 MMOL/L (ref 21–32)
CREAT SERPL-MCNC: 1.52 MG/DL (ref 0.6–1.3)
ERYTHROCYTE [DISTWIDTH] IN BLOOD BY AUTOMATED COUNT: 14.9 % (ref 11.6–15.1)
GFR SERPL CREATININE-BSD FRML MDRD: 43 ML/MIN/1.73SQ M
GLUCOSE SERPL-MCNC: 84 MG/DL (ref 65–140)
HCT VFR BLD AUTO: 33.6 % (ref 36.5–49.3)
HGB BLD-MCNC: 11 G/DL (ref 12–17)
INR PPP: 1.91 (ref 0.86–1.16)
MCH RBC QN AUTO: 31 PG (ref 26.8–34.3)
MCHC RBC AUTO-ENTMCNC: 32.7 G/DL (ref 31.4–37.4)
MCV RBC AUTO: 95 FL (ref 82–98)
PLATELET # BLD AUTO: 252 THOUSANDS/UL (ref 149–390)
PMV BLD AUTO: 9.5 FL (ref 8.9–12.7)
POTASSIUM SERPL-SCNC: 4.5 MMOL/L (ref 3.5–5.3)
PROT SERPL-MCNC: 6.9 G/DL (ref 6.4–8.2)
PROTHROMBIN TIME: 22.1 SECONDS (ref 12.1–14.4)
RBC # BLD AUTO: 3.55 MILLION/UL (ref 3.88–5.62)
SODIUM SERPL-SCNC: 137 MMOL/L (ref 136–145)
WBC # BLD AUTO: 6.03 THOUSAND/UL (ref 4.31–10.16)

## 2018-01-09 PROCEDURE — 85610 PROTHROMBIN TIME: CPT | Performed by: INTERNAL MEDICINE

## 2018-01-09 PROCEDURE — 85027 COMPLETE CBC AUTOMATED: CPT | Performed by: INTERNAL MEDICINE

## 2018-01-09 PROCEDURE — 80053 COMPREHEN METABOLIC PANEL: CPT | Performed by: INTERNAL MEDICINE

## 2018-01-09 NOTE — PROGRESS NOTES
Assessment   1  2-vessel coronary artery disease (414 00) (I25 10)   2  Benign hypertension with chronic kidney disease (403 10,585 9) (I12 9)   3  Hypertension (401 9) (I10)   4  Limb pain (729 5) (M79 609)   5  Type 2 diabetes mellitus (250 00) (E11 9)   6  Renal insufficiency (593 9) (N28 9)    Plan   Health Maintenance    · Oxybutynin Chloride 5 MG Oral Tablet; take one tablet by mouth one time daily    Discussion/Summary   Discussion Summary:    He had a very complicated operative course  He had hip replacement than 0 stroke and apparently a DVT  He now is on Coumadin  was told he needs to stay on Coumadin for 6 months  needs to follow up with Cardiology  He is going to be followed closely by [facility]  He is going to have an INR per his doctors at the rehab center on Monday  needs to follow up with his urologist and cardiologist    will see him back here in 6 weeks  Close follow-up with of his Coumadin was stressed to the son and the patient  He needs to monitor his blood sugars as well  Chief Complaint   Chief Complaint Free Text Note Form: The patient comes in after having hip surgery complicated by probable CVA  He has hypertension diabetes coronary artery disease and hyperlipidemia  was sent to rehab  He had bleeding into his hip  He had a DVT in his right leg  He is anticoagulated with Coumadin  He is on the way home from the rehab center today  He feels weak  He is walking with a walker  He is accompanied by his son  says his blood sugars were âgood  He is followed by the visiting nurses  History of Present Illness   HPI: He had osteoarthritis of his hip  He has right hip replaced  He has a multiple other problems as described  He generally feels weak  He is eating  He is having bowel movements  has not been home so he is not checking his blood sugars at this point  He is on glyburide for his diabetes        Review of Systems   Complete-Male:      Constitutional: feeling poorly, but-- as noted in HPI,-- no fever-- and-- no chills  Eyes: eyesight problems, but-- No complaints of eye pain, no red eyes, no discharge from eyes, no itchy eyes  ENT: no complaints of earache, no hearing loss, no nosebleeds, no nasal discharge, no sore throat, no hoarseness  Cardiovascular: He has coronary artery disease but no chest, but-- as noted in HPI  Respiratory: Breathing is fairly stable at the present time  , but-- No complaints of shortness of breath, no wheezing, no cough, no SOB on exertion, no orthopnea or PND  Gastrointestinal: No melena or hematochezia  , but-- No complaints of abdominal pain, no constipation, no nausea or vomiting, no diarrhea or bloody stools  Genitourinary: No complaints of dysuria, no incontinence, no hesitancy, no nocturia, no genital lesion, no testicular pain  Musculoskeletal: as noted in HPI  Integumentary: No complaints of skin rash or skin lesions, no itching, no skin wound, no dry skin  Neurological: He had a change in mental status with presumed stroke , but-- as noted in HPI  Active Problems   1  2-vessel coronary artery disease (414 00) (I25 10)   2  Abdominal pain, LLQ (left lower quadrant) (789 04) (R10 32)   3  Active advance directive (V49 89) (Z78 9)   4  Acute anterior circulation TIA (435 9) (G45 8)   5  Aortic valve disorder (424 1) (I35 9)   6  Backache (724 5) (M54 9)   7  Benign hypertension with chronic kidney disease (403 10,585 9) (I12 9)   8  Changing skin lesion (709 9) (L98 9)   9  Chest pain (786 50) (R07 9)   10  Coronary arteriosclerosis (414 00) (I25 10)   11  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)   12  Dyspnea on effort (786 09) (R06 09)   13  Encounter for monitoring antiplatelet therapy (H81 28,K36 25) (Z51 81,Z79 02)   14  Hematuria (599 70) (R31 9)   15  Hyperlipidemia (272 4) (E78 5)   16  Hypertension (401 9) (I10)   17  Limb pain (729 5) (M79 609)   18   No advance directive on file (V49 89) (Z78 9)   19  Obesity (278 00) (E66 9)   20  Osteoarthritis of hip (715 95) (M16 9)   21  Preoperative clearance (V72 84) (Z01 818)   22  Proteinuria (791 0) (R80 9)   23  Renal insufficiency (593 9) (N28 9)   24  S/P cardiac cath (V45 89) (Z98 890)   25  Screening for genitourinary condition (V81 6) (Z13 89)   26  Screening for skin condition (V82 0) (Z13 89)   27  Seborrheic keratosis (702 19) (L82 1)   28  Stage III chronic kidney disease (585 3) (N18 3)   29  Type 2 diabetes mellitus (250 00) (E11 9)   30  Vitamin D deficiency (268 9) (E55 9)    Past Medical History   1  History of Acute ST segment elevation MI (410 90) (I21 3)   2  History of Benign prostatic hypertrophy with lower urinary tract symptoms (LUTS) (600 01) (N40 1)   3  History of Colonoscopy (Fiberoptic)   4  History of Coronary arteriosclerosis (414 00) (I25 10)   5  History of aortic valve disorder (V12 59) (Z86 79)   6  History of hyperlipidemia (V12 29) (Z86 39)   7  History of hypertension (V12 59) (Z86 79)   8  History of shortness of breath (V13 89) (Z87 898)   9  History of transient cerebral ischemia (V12 54) (Z86 73)   10  History of Neuralgia (729 2) (M79 2)   11  History of Type 2 diabetes mellitus (250 00) (E11 9)  Active Problems And Past Medical History Reviewed: The active problems and past medical history were reviewed and updated today  Surgical History   1  History of Cardiac Cath Procedure Outcome: Successful   2  History of Cath Stent Placement   3  History of Coronary Artery Surgery   4  History of Hand Surgery   5  History of Hip Replacement  Surgical History Reviewed: The surgical history was reviewed and updated today  Family History   Mother    1  Family history of Family Health Status Of Mother -   Father    2  Family history of Chronic Obstructive Pulmonary Disease   3  Family history of Family Health Status Of Father -   Family History Reviewed:     The family history was reviewed and updated today  Social History    · Active advance directive (V49 89) (Z78 9)   · Denied: History of Alcohol Use (History)   · Never A Smoker   · No advance directive on file (V49 89) (Z78 9)   · Tobacco non-user (V49 89) (Z78 9)  Social History Reviewed: The social history was reviewed and updated today  Current Meds    1  AmLODIPine Besylate 10 MG Oral Tablet; TAKE 1 TABLET BY MOUTH IN THE MORNING; Therapy: 92FID7104 to (Evaluate:72Cyb4440)  Requested for: 84AOT6188; Last Rx:64Tbb3121     Ordered   2  Aspirin 81 MG Oral Tablet Delayed Release; Therapy: (Recorded:83Shc9464) to Recorded   3  Coumadin 4 MG Oral Tablet; TAKE 1 TABLET DAILY; Therapy: 06PGZ1378 to Recorded   4  Doxazosin Mesylate 4 MG Oral Tablet; take one tablet by mouth one time daily; Therapy: 27XNT8713 to (Evaluate:79Rqo7161)  Requested for: 80LED7831; Last Rx:50Fez0960     Ordered   5  Folic Acid 1 MG Oral Tablet; TAKE 1 TABLET DAILY; Therapy: 14RRO1779 to Recorded   6  GlyBURIDE 5 MG Oral Tablet; TAKE 2 TABLETS BY MOUTH IN THE MORNING AND TAKE 1 TABLET IN     THE EVENING; Therapy: 11KYK6338 to (NGRDZWYV:18ALD7201)  Requested for: 54Lls7900; Last Rx:38Yms0855     Ordered   7  Lisinopril 40 MG Oral Tablet; TAKE ONE TABLET BY MOUTH IN THE MORNING AND ONE-HALF IN THE     P M;     Therapy: 34Kwj4722 to (Evaluate:47Wgj4252)  Requested for: 17Vvg4407; Last Rx:23Fts6224     Ordered   8  Metoprolol Succinate ER 25 MG Oral Tablet Extended Release 24 Hour; take 1 tablet by mouth twice     a day; Therapy: 89PJC8610 to (Climmie Plants)  Requested for: 43Znx8727; Last Rx:67Oev7101     Ordered   9  Neurontin 100 MG Oral Capsule; TAKE 1 CAPSULE TWICE DAILY; Therapy: 91UFO6098 to Recorded   10  OneTouch Ultra Blue In Vitro Strip; USE TO TEST BLOOD SUGAR TWICE DAILY; Therapy: 69YCO9338 to (Evaluate:98Ojp1754)  Requested for: 83WWL2480; Last Rx:48Hct5829      Ordered   11   OneTouch Ultra Mini KIT; USE BID DAILY DX 250;      Therapy: (QQJMYMMB:18IJF9935) to Recorded   12  Oxybutynin Chloride 5 MG Oral Tablet; take one tablet by mouth one time daily; Therapy: 09Peh9863 to (Evaluate:41Usc1605)  Requested for: 01Sep2017; Last Rx:01Sep2017      Ordered   13  TraMADol HCl TABS; Therapy: (Recorded:14Nov2017) to Recorded  Medication List Reviewed: The medication list was reviewed and updated today  Allergies   1  CeleBREX CAPS   2  Dilaudid TABS   3  Statins    Vitals   Vital Signs    Recorded: 71OEP2024 04:25PM   Heart Rate 63   Systolic 731   Diastolic 68   Height 5 ft 10 in   O2 Saturation 97     Physical Exam        Constitutional      General appearance: Abnormal  -- He is examined in a wheelchair  He looks tired  He has difficulty elevating his right leg  He has bilateral trace of edema  Blood pressure is 138/68  Pulse is 63  Eyes      Conjunctiva and lids: No swelling, erythema, or discharge  Ears, Nose, Mouth, and Throat      Otoscopic examination: Tympanic membrance translucent with normal light reflex  Canals patent without erythema  Nasal mucosa, septum, and turbinates: Normal without edema or erythema  Oropharynx: Normal with no erythema, edema, exudate or lesions  Pulmonary      Respiratory effort: No increased work of breathing or signs of respiratory distress  -- Lungs are clear  Cardiovascular      Auscultation of heart: Normal rate and rhythm, normal S1 and S2, without murmurs  Examination of extremities for edema and/or varicosities: Abnormal        Carotid pulses: Normal        Abdomen      Abdomen: Abnormal        Liver and spleen: No hepatomegaly or splenomegaly  Lymphatic      Palpation of lymph nodes in neck: No lymphadenopathy  Musculoskeletal      Inspection/palpation of joints, bones, and muscles: Abnormal  -- Decreased elevation of the right hip  Skin      Skin and subcutaneous tissue: Normal without rashes or lesions  Neurologic      Cranial nerves: Cranial nerves 2-12 intact  Sensation: No sensory loss  Psychiatric      Orientation to person, place and time: Normal        Mood and affect: Abnormal           Health Management   Health Maintenance   COLONOSCOPY; every 10 years; Next Due: 73QDX6490; Overdue    Future Appointments      Date/Time Provider Specialty Site   01/11/2018 11:15 AM Fern[facility]   03/14/2018 09:15 AM TARAS Ye   Dermatology [facility]     Signatures    Electronically signed by : Kirby Leventhal, DO; Jan 8 2018  8:01AM EST                       (Author)

## 2018-01-09 NOTE — MISCELLANEOUS
History of Present Illness  TCM Communication [facility]: The patient is being contacted for follow-up after hospitalization  Hospital records were reviewed  He was hospitalized at [place] [address]  The date of admission:, date of discharge: 11/24/17  He was discharged hosp  He did not schedule a follow up appointment  Communication performed and completed by      Active Problems    1  2-vessel coronary artery disease (414 00) (I25 10)   2  Abdominal pain, LLQ (left lower quadrant) (789 04) (R10 32)   3  Active advance directive (V49 89) (Z78 9)   4  Acute anterior circulation TIA (435 9) (G45 8)   5  Acute ST segment elevation MI (410 90) (I21 3)   6  Aortic valve disorder (424 1) (I35 9)   7  Backache (724 5) (M54 9)   8  Benign hypertension with chronic kidney disease (403 10,585 9) (I12 9)   9  Changing skin lesion (709 9) (L98 9)   10  Chest pain (786 50) (R07 9)   11  Coronary arteriosclerosis (414 00) (I25 10)   12  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)   13  Dyspnea on effort (786 09) (R06 09)   14  Encounter for monitoring antiplatelet therapy (K18 53,I76 39) (Z51 81,Z79 02)   15  Hematuria (599 70) (R31 9)   16  Hyperlipidemia (272 4) (E78 5)   17  Hypertension (401 9) (I10)   18  Limb pain (729 5) (M79 609)   19  No advance directive on file (V49 89) (Z78 9)   20  Obesity (278 00) (E66 9)   21  Osteoarthritis of hip (715 95) (M16 9)   22  Preoperative clearance (V72 84) (Z01 818)   23  Proteinuria (791 0) (R80 9)   24  Renal insufficiency (593 9) (N28 9)   25  S/P cardiac cath (V45 89) (Z98 890)   26  Screening for genitourinary condition (V81 6) (Z13 89)   27  Screening for skin condition (V82 0) (Z13 89)   28  Seborrheic keratosis (702 19) (L82 1)   29  Stage III chronic kidney disease (585 3) (N18 3)   30  Type 2 diabetes mellitus (250 00) (E11 9)   31  Vitamin D deficiency (268 9) (E55 9)    Past Medical History    1   History of Benign prostatic hypertrophy with lower urinary tract symptoms (LUTS) (600 01) (N40 1)   2  History of Colonoscopy (Fiberoptic)   3  History of Coronary arteriosclerosis (414 00) (I25 10)   4  History of aortic valve disorder (V12 59) (Z86 79)   5  History of hyperlipidemia (V12 29) (Z86 39)   6  History of hypertension (V12 59) (Z86 79)   7  History of shortness of breath (V13 89) (Z87 898)   8  History of transient cerebral ischemia (V12 54) (Z86 73)   9  History of Neuralgia (729 2) (M79 2)   10  History of Type 2 diabetes mellitus (250 00) (E11 9)    Surgical History    1  History of Cardiac Cath Procedure Outcome: Successful   2  History of Cath Stent Placement   3  History of Coronary Artery Surgery   4  History of Hand Surgery   5  History of Hip Replacement    Family History  Mother    1  Family history of Family Health Status Of Mother -   Father    2  Family history of Chronic Obstructive Pulmonary Disease   3  Family history of Family Health Status Of Father -     Social History    · Active advance directive (V4 89) (Z78 9)   · Denied: History of Alcohol Use (History)   · Never A Smoker   · No advance directive on file (V4 89) (Z78 9)   · Tobacco non-user (V4 89) (Z78 9)    Current Meds   1  AmLODIPine Besylate 10 MG Oral Tablet; TAKE 1 TABLET BY MOUTH IN THE MORNING; Therapy: 01EPM3437 to (Evaluate:14Azf7003)  Requested for: 79GUP2661; Last   Rx:2017 Ordered   2  Aspirin 81 MG Oral Tablet Delayed Release; Therapy: (Recorded:07Jvf1109) to Recorded   3  Clopidogrel Bisulfate 75 MG Oral Tablet; take one tablet by mouth one time daily; Therapy: 38WUJ3197 to (ZKKEGYXK:45UHH4352)  Requested for: 05OCK6404; Last   Rx:2017 Ordered   4  Doxazosin Mesylate 4 MG Oral Tablet; take one tablet by mouth one time daily; Therapy: 79JVX3702 to (Evaluate:06Yyo9387)  Requested for: 49KDI0335; Last   Rx:38Slm5057 Ordered   5  GlyBURIDE 5 MG Oral Tablet; TAKE 2 TABLETS BY MOUTH IN THE MORNING AND TAKE   1 TABLET IN THE EVENING;    Therapy: 01WKS6854 to (SZSCCDRH:47RRN2874)  Requested for: 07Lkg5484; Last   WM:85NGB8730 Ordered   6  Lisinopril 40 MG Oral Tablet; TAKE ONE TABLET BY MOUTH IN THE MORNING AND   ONE-HALF IN THE P M;   Therapy: 54Ptj8504 to (Evaluate:48Clv7784)  Requested for: 52Cmr6828; Last   Rx:17Aty0034 Ordered   7  Metoprolol Succinate ER 25 MG Oral Tablet Extended Release 24 Hour; take 1 tablet by   mouth twice a day; Therapy: 04OUK2671 to (454 5656)  Requested for: 58Opj9524; Last   Rx:47Opl7966 Ordered   8  OneTouch Ultra Blue In Vitro Strip; USE TO TEST BLOOD SUGAR TWICE DAILY; Therapy: 44VMV7346 to (Evaluate:04Jcj8480)  Requested for: 91GGY2854; Last   Rx:44Dyz9569 Ordered   9  OneTouch Ultra Mini KIT; USE BID DAILY ; Therapy: (IDMKVBXC:31MEO7358) to Recorded   10  Oxybutynin Chloride 5 MG Oral Tablet; take one tablet by mouth one time daily; Therapy: 83Nsm6749 to (Evaluate:03Gmn2443)  Requested for: 50Arc5591; Last    Rx:29Whh8833 Ordered   11  TraMADol HCl TABS; Therapy: (Recorded:14Nov2017) to Recorded    Allergies    1  CeleBREX CAPS   2  Dilaudid TABS   3  Statins    Health Management  Health Maintenance   COLONOSCOPY; every 10 years; Next Due: 26VLS5340; Overdue    Future Appointments    Date/Time Provider Specialty Site   01/11/2018 11:15 AM Rojas Vaughn DO Internal Medicine [facility]   03/14/2018 09:15 AM TARAS Richard   Dermatology [facility]     Signatures   Electronically signed by : Sami Moreno [facility]; Nov 27 2017  2:12PM EST                       (Author)    Electronically signed by : Sandee Echeverria DO; Nov 27 2017  6:03PM EST                       (Co-author)

## 2018-01-10 ENCOUNTER — GENERIC CONVERSION - ENCOUNTER (OUTPATIENT)
Dept: OTHER | Facility: OTHER | Age: 83
End: 2018-01-10

## 2018-01-10 NOTE — PROGRESS NOTES
Assessment  Assessed    1  2-vessel coronary artery disease (414 00) (I25 10)   2  Dyspnea on effort (786 09) (R06 09)   3  Hypertension (401 9) (I10)   4  Encounter for monitoring antiplatelet therapy (L33 02,X28 17) (Z51 81,Z79 02)   5  Hyperlipidemia (272 4) (E78 5)   6  Obesity (278 00) (E66 9)    Plan  2-vessel coronary artery disease, Dyspnea on effort    · NM MYOCARDIAL PERFUSION SPECT (RX STRESS AND/OR REST); Status:Hold For -  Scheduling; Requested for:10Mar2017;    Perform:[facility]; Due:10Mar2018; Ordered; For:2-vessel coronary artery disease, Dyspnea on effort; Ordered By:Katlin Sweeney; Dyspnea on effort    · ECHO COMPLETE WITH CONTRAST IF INDICATED; Status:Hold For - Scheduling;  Requested for:10Mar2017;    Perform:[facility]; Due:10Mar2018; Ordered; For:Dyspnea on effort; Ordered By:Katlin Sweeney; Discussion/Summary  Cardiology Discussion Summary Free Text Note Form  [facility]:   Patient with multiple medical problems who seems to be doing reasonably well from cardiac standpoint  Previous studies reviewed with patient, medications reviewed and possible side effects discussed  Continue concepts of atherosclerosis, signs and symptoms of cardiac disease  risk factor modification  All questions answered  Safety measures reviewed  Patient advised to report any problems prompting medical attention  Given symptoms of shortness of breath  Patient will be scheduled for pharmacological nuclear stress test to assess for ischemia  Patient is unable to exercise on treadmill because of arthritis and significant shortness of breath with minimal exertion  Extensive list of medications were reviewed  Patient understands the risks and benefits of antiplatelet therapy to prevent stent thrombosis  Echocardiogram will be done to evaluate ejection fraction  Valves and look for any evidence of pulmonary hypertension  Patient had mild aortic stenosis by Doppler in 2015   Patient counseled to lose weight to reduce cardiovascular morbidity and mortality  Follow-up in 6 months  Follow-up with Dr Lentz [facility]  Patient's Capacity to Self-Care: Patient is able to Self-Care  Counseling Documentation With Imm: The patient was counseled regarding instructions for management, risk factor reductions, impressions, risks and benefits of treatment options  Chief Complaint  Chief Complaint Chronic Condition [facility]: Patient is here today for follow up of chronic conditions described in HPI  History of Present Illness  Cardiology hospitals Free Text Note Form [facility]: Patient presents for follow-up visit  Patient does have symptoms of shortness of breath with exertion  No chest pain  No history of leg edema, orthopnea, PND  He has multiple medical problems including chronic kidney disease  He states that he has been compliant with all his present medications  Review of Systems  Cardiology Male ROS:     Cardiac: as noted in HPI  Skin: No complaints of nonhealing sores or skin rash  Genitourinary: No complaints of recurrent urinary tract infections, frequent urination at night, difficult urination, blood in urine, kidney stones, loss of bladder control, no kidney or prostate problems, no erectile dysfunction  Psychological: No complaints of feeling depressed, anxiety, panic attacks, or difficulty concentrating  General: No complaints of trouble sleeping, lack of energy, fatigue, appetite changes, weight changes, fever, frequent infections, or night sweats  Respiratory: as noted in HPI  HEENT: No complaints of serious problems, hearing problems, nose problems, throat problems, or snoring  Gastrointestinal: No complaints of liver problems, nausea, vomiting, heartburn, constipation, bloody stools, diarrhea, problems swallowing, adbominal pain, or rectal bleeding  Hematologic: No complaints of bleeding disorders, anemia, blood clots, or excessive brusing     Neurological: No complaints of numbness, tingling, dizziness, weakness, seizures, headaches, syncope or fainting, AM fatigue, daytime sleepiness, no witnessed apnea episodes  Musculoskeletal: arthritis   ROS Reviewed:   ROS reviewed  Active Problems  Problems    1  2-vessel coronary artery disease (414 00) (I25 10)   2  Abdominal pain, LLQ (left lower quadrant) (789 04) (R10 32)   3  Acute anterior circulation TIA (435 9) (G45 8)   4  Acute ST segment elevation MI (410 90) (I21 3)   5  Aortic valve disorder (424 1) (I35 9)   6  Backache (724 5) (M54 9)   7  Benign hypertension with chronic kidney disease (403 10,585 9) (I12 9)   8  Changing skin lesion (709 9) (L98 9)   9  Chest pain (786 50) (R07 9)   10  CKD (chronic kidney disease), stage 3 (moderate) (585 3) (N18 3)   11  Coronary arteriosclerosis (414 00) (I25 10)   12  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)   13  Encounter for monitoring antiplatelet therapy (I01 43,D23 15) (Z51 81,Z79 02)   14  Hematuria (599 70) (R31 9)   15  Hyperlipidemia (272 4) (E78 5)   16  Hypertension (401 9) (I10)   17  Limb pain (729 5) (M79 609)   18  Obesity (278 00) (E66 9)   19  Proteinuria (791 0) (R80 9)   20  Renal insufficiency (593 9) (N28 9)   21  S/P cardiac cath (V45 89) (Z98 890)   22  Type 2 diabetes mellitus (250 00) (E11 9)   23  Vitamin D deficiency (268 9) (E55 9)    Past Medical History  Problems    1  History of Benign prostatic hypertrophy with lower urinary tract symptoms (LUTS)   (600 01) (N40 1)   2  History of Colonoscopy (Fiberoptic)   3  History of Coronary arteriosclerosis (414 00) (I25 10)   4  History of aortic valve disorder (V12 59) (Z86 79)   5  History of hyperlipidemia (V12 29) (Z86 39)   6  History of hypertension (V12 59) (Z86 79)   7  History of shortness of breath (V13 89) (Z87 898)   8  History of transient cerebral ischemia (V12 54) (Z86 73)   9  History of Neuralgia (729 2) (M79 2)   10   History of Type 2 diabetes mellitus (250 00) (E11 9)  Active Problems And Past Medical History Reviewed: The active problems and past medical history were reviewed and updated today  Surgical History  Problems    1  History of Cardiac Cath Procedure Outcome: Successful   2  History of Cath Stent Placement   3  History of Coronary Artery Surgery   4  History of Hand Surgery   5  History of Hip Replacement  Surgical History Reviewed: The surgical history was reviewed and updated today  Family History  Mother    1  Family history of Family Health Status Of Mother -   Father    2  Family history of Chronic Obstructive Pulmonary Disease   3  Family history of Family Health Status Of Father -   Family History Reviewed: The family history was reviewed and updated today  Social History  Problems    · Denied: History of Alcohol Use (History)   · Never A Smoker  Social History Reviewed: The social history was reviewed and updated today  Current Meds   1  AmLODIPine Besylate 10 MG Oral Tablet; Take One tablet by mouth in the morning; Therapy: 34ZIU9174 to (Evaluate:2017)  Requested for: 52IIC2247; Last   Rx:30Wlk4882 Ordered   2  Aspirin 81 MG Oral Tablet Delayed Release; Therapy: (Recorded:91Thl1028) to Recorded   3  Clopidogrel Bisulfate 75 MG Oral Tablet; take one tablet by mouth one time daily; Therapy: 69ELP6987 to (DRRVKUTA:94AMI1979)  Requested for: 65PMC2290; Last   Rx:2017 Ordered   4  Doxazosin Mesylate 4 MG Oral Tablet; take one tablet by mouth one time daily; Therapy: 73CEB4424 to (Evaluate:2017)  Requested for: 75Zgm0188; Last   Rx:59Ubj9243 Ordered   5  GlyBURIDE 5 MG Oral Tablet; TAKE 2 TABLETS BY MOUTH IN THE MORNING AND 1   TABLET IN THE EVENING; Therapy: 56QBE0780 to (Buddy Silverio)  Requested for: 12JXC3829; Last   Rx:31Uyb9480 Ordered   6   Lisinopril 40 MG Oral Tablet; TAKE ONE TABLET BY MOUTH IN THE MORNING AND   ONE-HALF IN THE P M;   Therapy: 71Zag1206 to (Renee Luna)  Requested for: 2016; Last Rx:46Cbj0163 Ordered   7  Metoprolol Succinate ER 25 MG Oral Tablet Extended Release 24 Hour; take one tablet   by mouth twice daily; Therapy: 70WOX6843 to (Evaluate:11Jun2017)  Requested for: 72KQJ8005; Last   Rx:27Jan2017 Ordered   8  OneTouch Ultra Blue In Vitro Strip; USE TO TEST BLOOD SUGAR TWICE DAILY; Therapy: 72GGA1061 to (Evaluate:79Kcv8886)  Requested for: 59CUJ1456; Last   Rx:01Ash0164 Ordered   9  OneTouch Ultra Mini KIT; USE BID DAILY ; Therapy: (OXNWAV:09EWJ7319) to Recorded   10  Oxybutynin Chloride 5 MG Oral Tablet; take one tablet by mouth one time daily; Therapy: 31Foc7590 to (Evaluate:65Qbi9178)  Requested for: 79Jur3206; Last    Rx:68Udn4575 Ordered   11  Rosuvastatin Calcium 5 MG Oral Tablet; TAKE ONE TABLET BY MOUTH ONCE DAILY; Therapy: 93YAJ2971 to (Last Rx:12Oct2016)  Requested for: 12Toj1459 Ordered   12  Vitamin D (Ergocalciferol) 40511 UNIT Oral Capsule; TAKE 1 CAPSULE Weekly; Therapy: 32QZB0478 to (Last Rx:00Jto6180)  Requested for: 44RUL8462 Ordered  Medication List Reviewed: The medication list was reviewed and updated today  Allergies  Medication    1  CeleBREX CAPS   2  Dilaudid TABS   3  Statins    Vitals  Vital Signs    Recorded: 66BHD3995 08:33AM   Heart Rate 88   Systolic 076   Diastolic 82   Height 5 ft 10 in   Weight 227 lb    BMI Calculated 32 57   BSA Calculated 2 2   O2 Saturation 98     Physical Exam    Constitutional   General appearance: No acute distress, well appearing and well nourished  Eyes   Conjunctiva and Sclera examination: Conjunctiva pink, sclera anicteric  Ears, Nose, Mouth, and Throat - External inspection of ears and nose: Normal without deformities or discharge  Oropharynx: Clear, nares are clear, mucous membranes are moist    Pulmonary   Respiratory effort: No increased work of breathing or signs of respiratory distress  Auscultation of lungs: Clear to auscultation, no rales, no rhonchi, no wheezing, good air movement  Cardiovascular   Palpation of heart: Normal PMI, no thrills  Auscultation of heart: Normal rate and rhythm, normal S1 and S2, no murmurs  Carotid pulses: Normal, 2+ bilaterally  Peripheral vascular exam: Normal pulses throughout, no tenderness, erythema or swelling  Musculoskeletal Gait and station: Normal gait  Neurologic - Speech: Normal     Psychiatric - Orientation to person, place, and time: Normal  Mood and affect: Normal       Health Management  Health Maintenance   COLONOSCOPY; every 10 years; Next Due: 34XDT0277; Overdue    Future Appointments    Date/Time Provider Specialty Site   04/18/2017 09:30 AM TARAS Mendiola   Nephrology [address]   05/01/2017 10:00 AM Taina Butt DO Internal Medicine [facility]     Signatures   Electronically signed by : TARAS Winslow ; Mar 12 2017  7:02PM EST                       (Author)

## 2018-01-11 NOTE — RESULT NOTES
Verified Results  ECHO COMPLETE WITH CONTRAST IF INDICATED 99QQW9059 07:53AM [place], Audley [place] Order Number: [id number]    - Patient Instructions: To schedule this appointment, please contact Central Scheduling at [phone]  Test Name Result Flag Reference   ECHO COMPLETE WITH CONTRAST IF INDICATED (Report)     [address]   [phone]     Transthoracic Echocardiogram   2D, M-mode, and Color Doppler     Study date: 28-Mar-2017     Patient: Zelda Young   MR number: [id number]   Account number: [de-identified]   : 1935   Age: 80 years   Gender: Male   Status: Outpatient   Location: [facility]   Height: 70 in   Weight: 227 lb   BP: 158/ 82 mmHg     Indications: Dyspnea  Diagnoses: R06 09 - Other forms of dyspnea     Sonographer: Gracia RCS   Interpreting Physician: Marce Renee MD   Primary Physician: Héctor Jimenez DO   Referring Physician: Marce Renee MD   Group: [facility] [place]     SUMMARY     LEFT VENTRICLE:   Ejection fraction was estimated to be 60 %  There were no regional wall motion abnormalities  There was mild concentric hypertrophy  MITRAL VALVE:   There was mild annular calcification  There was mild regurgitation  AORTIC VALVE:   There was mild stenosis  TRICUSPID VALVE:   There was trace regurgitation  HISTORY: PRIOR HISTORY: Abdominal aortic aneurysm  Chronic kidney disease  CAD  Myocardial infarction  Risk factors: hypertension, diabetes, and medication-treated hypercholesterolemia  Cerebrovascular disease  PRIOR PROCEDURES: Stent  PROCEDURE: The study was performed in the [address] [place] [address]  This was a routine study  The transthoracic approach was used  The study included complete 2D imaging, M-mode, and color Doppler  The heart rate was 52 bpm, at the   start of the study   Images were obtained from the parasternal, apical, subcostal, and suprasternal notch acoustic windows  Image quality was adequate  LEFT VENTRICLE: Size was normal  Ejection fraction was estimated to be 60 %  There were no regional wall motion abnormalities  There was mild concentric hypertrophy  DOPPLER: There was an increased relative contribution of atrial   contraction to ventricular filling  RIGHT VENTRICLE: The size was normal  Systolic function was normal  Wall thickness was normal      LEFT ATRIUM: Size was normal      RIGHT ATRIUM: Size was normal      MITRAL VALVE: There was mild annular calcification  Valve structure was normal  There was normal leaflet separation  DOPPLER: The transmitral velocity was within the normal range  There was no evidence for stenosis  There was mild   regurgitation  AORTIC VALVE: The valve was probably trileaflet  Leaflets exhibited mild calcification  The valve was not well visualized  DOPPLER: There was mild stenosis  TRICUSPID VALVE: The valve structure was normal  There was normal leaflet separation  DOPPLER: The transtricuspid velocity was within the normal range  There was no evidence for stenosis  There was trace regurgitation  PULMONIC VALVE: Leaflets exhibited normal thickness, no calcification, and normal cuspal separation  DOPPLER: The transpulmonic velocity was within the normal range  There was no regurgitation  PERICARDIUM: There was no pericardial effusion  The pericardium was normal in appearance  AORTA: The root exhibited normal size  SYSTEM MEASUREMENT TABLES     Apical four chamber   4 chamber Left Atrium Volume Index; Planimetry; End Systole; Apical four chamber;: 22 07 cm2   Left Ventricular Diastolic Area; Method of Disks, Single Plane; End Diastole; Apical four chamber;: 49 91 cm2   Left Ventricular Ejection Fraction; Method of Disks, Single Plane; Apical four chamber;: 56 2 %   Left Ventricular systolic Area; Method of Disks, Single Plane;  End Systole; Apical four chamber;: 30 06 cm2   Right Atrium Systolic Area; Planimetry; End Systole; Apical four chamber;: 13 88 cm2   Right Ventricular Internal Diastolic Dimension; End Diastole; Apical four chamber;: 38 8 mm   TAPSE: 20 mm     Unspecified Scan Mode   Aortic Root Diameter; End Systole;: 41 9 mm   Aortic valve Area; Continuity Equation by Velocity Time Integral; Systole;: 2 65 cm2   Cardiovascular Orifice Diameter; End Systole;: 26 9 mm   Gradient Pressure, Peak; Simplified Bernoulli; Antegrade Flow; Systole;: 13 9 mm[Hg]   Gradient pressure, average; Simplified Bernoulli; Antegrade Flow; Systole;: 8 1 mm[Hg]   Left atrial diameter; End Diastole;: 40 5 mm   Cardiac Output; Teichholz; Systole;: 3 06 L/min   Heart rate; Teichholz;: 64 {H  B }/min   Interventricular Septum Diastolic Thickness; Teichholz; End Diastole;: 13 2 mm   Left Ventricle Internal End Diastolic Dimension; Teichholz;: 51 3 mm   Left Ventricle Internal Systolic Dimension; Teichholz; End Systole;: 40 2 mm   Left Ventricle Mass; Mass AVCube with Teichholz; End Diastole;: 262 g   Left Ventricle Posterior Wall Diastolic Thickness; Teichholz; End Diastole;: 12 1 mm   Left Ventricular Ejection Fraction; Teichholz;: 43 6 %   Left Ventricular End Diastolic Volume; Teichholz;: 125 5 ml   Left Ventricular End Systolic Volume; Teichholz;: 70 8 ml   Left Ventricular Fractional Shortening;: 21 6 %   Stroke volume;  Teichholz; Systole;: 54 7 ml   Mitral Valve Area; Area by Pressure Half-Time; Systole;: 3 61 cm2   Mitral Valve E to A Ratio; Systole;: 0 91   Pressure half time; Diastole;: 0 06 s   Maximum Tricuspid valve regurgitation pressure gradient; Regurgitant Flow; Systole;: 22 2 mm[Hg]     [facility] Accredited [facility]     Prepared and electronically signed by     Marce Renee MD   Signed 29-Mar-2017 15:19:05

## 2018-01-11 NOTE — MISCELLANEOUS
Provider Comments  Provider Comments:   PT NO SHOW/MISSED APPOINTMENT HAS NOT CALLED BACK TO R/S A LETTER WILL BE SENT HOME TO THE PT ABOUT MISSED APPOINTMENT        Signatures   Electronically signed by : TARAS Pearce ; Apr 18 2017 10:31AM EST                       (Author)

## 2018-01-12 ENCOUNTER — TRANSCRIBE ORDERS (OUTPATIENT)
Dept: ADMINISTRATIVE | Facility: HOSPITAL | Age: 83
End: 2018-01-12

## 2018-01-12 ENCOUNTER — GENERIC CONVERSION - ENCOUNTER (OUTPATIENT)
Dept: OTHER | Facility: OTHER | Age: 83
End: 2018-01-12

## 2018-01-12 ENCOUNTER — APPOINTMENT (OUTPATIENT)
Dept: LAB | Facility: HOSPITAL | Age: 83
End: 2018-01-12
Payer: MEDICARE

## 2018-01-12 VITALS
BODY MASS INDEX: 31.64 KG/M2 | HEART RATE: 68 BPM | WEIGHT: 221 LBS | SYSTOLIC BLOOD PRESSURE: 130 MMHG | DIASTOLIC BLOOD PRESSURE: 78 MMHG | OXYGEN SATURATION: 97 % | HEIGHT: 70 IN

## 2018-01-12 DIAGNOSIS — I25.10 ATHEROSCLEROTIC HEART DISEASE OF NATIVE CORONARY ARTERY WITHOUT ANGINA PECTORIS: ICD-10-CM

## 2018-01-12 DIAGNOSIS — G45.8 OTHER TRANSIENT CEREBRAL ISCHEMIC ATTACKS AND RELATED SYNDROMES: ICD-10-CM

## 2018-01-12 LAB
INR PPP: 2.17 (ref 0.86–1.16)
PROTHROMBIN TIME: 24.8 SECONDS (ref 12.1–14.4)

## 2018-01-12 PROCEDURE — 85610 PROTHROMBIN TIME: CPT

## 2018-01-12 PROCEDURE — 36415 COLL VENOUS BLD VENIPUNCTURE: CPT

## 2018-01-12 NOTE — MISCELLANEOUS
This is to state that this patient may stop Plavix for 3 days prior to dental extraction and restart as soon as possible when okay from dental standpoint  Patient does not meet criteria for antibiotic prophylaxis from  a cardiac standpoint  Please do not hesitate to contact me if you have any specific questions  Electronically signed by:Katlin WILSON    May  1 2017  8:14PM EST

## 2018-01-12 NOTE — RESULT NOTES
Verified Results  (1) TISSUE EXAM [id number] 11:41AM Kari Hang Order Number: [id number]     Test Name Result Flag Reference   LAB AP CASE REPORT (Report)     Surgical Pathology Report             Case: [id number]                   Authorizing Provider: Marzena Contreras MD     Collected:      09/08/2017 1141        Pathologist:      Meka Locke MD      Received:      09/11/2017 0900        Specimen:  Skin, Other, R lateral Nasal wall   LAB AP FINAL DIAGNOSIS (Report)     A  Skin, Right lateral Nasal wall, shave biopsy:  - Ulcerated basal cell carcinoma, nodular type, extending to the   peripheral border and base of biopsy  Interpretation performed at [place] [address]  Electronically signed by Meka Locke MD on 9/15/2017 at 8:19 PM   LAB AP SURGICAL ADDITIONAL INFORMATION (Report)     All controls performed with the immunohistochemical stains reported above   reacted appropriately  These tests were developed and their performance   characteristics determined by [facility] or   [address] [place] [address]  They may not be cleared or approved by the [place]  Food and Drug Administration  The FDA has determined that such clearance   or approval is not necessary  These tests are used for clinical purposes  They should not be regarded as investigational or for research  This   laboratory has been approved by [place] [address], designated as a high-complexity   laboratory and is qualified to perform these tests  LAB AP GROSS DESCRIPTION (Report)     A  The specimen is received in formalin, labeled with the patient's name   and hospital number, and is designated skin shave right lateral nasal   wall  The specimen consists of a tan superficial shave of skin measuring   0 6 x 0 4 x 0 1 cm  The skin surface appears keratotic  The margin of   resection is inked green  The skin surface is inked red  The specimen is   bisected lengthwise   Entirely submitted  One cassette  Specimen fragmented   upon bisecting  Note: The estimated total formalin fixation time based upon information   provided by the submitting clinician and the standard processing schedule   is over 72 hours   MAC   LAB AP CLINICAL INFORMATION      TW Order Number: [id number]  R/O [facility]

## 2018-01-12 NOTE — RESULT NOTES
Verified Results  NM MYOCARDIAL PERFUSION SPECT (RX STRESS AND/OR REST) [id number] 07:56AM Robert Al Order Number: [id number]    - Patient Instructions: To schedule this appointment, please contact Central Scheduling at [phone]  Test Name Result Flag Reference   NM MYOCARDIAL PERFUSION SPECT (RX STRESS AND/OR REST) (Report)     [address] [place], [address] [place] [address]   [phone]     Rest/Stress Gated SPECT Myocardial Perfusion Imaging After Regadenoson     Patient: Tristan Layne   MR number: [id number]   Account number: [de-identified]   : 1935   Age: 80 years   Gender: Male   Status: Outpatient   Location: [facility]   Height: 70 in   Weight: 227 lb   BP: 120/ 74 mmHg     Allergies: ALLERGIES NOT ON FILE     Diagnosis: I25 10 - Atherosclerotic heart disease of native coronary artery without angina pectoris, R06 09 - Other forms of dyspnea     Primary Physician: Jensen Gonzalez DO   Interpreting Physician: Jun Cameron MD   Referring Physician: Jun Cameron MD   Technician: Barrera Maria BS, BA, AART(N)   Group: [facility]   Other: Wili Riley MS, CCT     INDICATIONS: Evaluation of known coronary artery disease  HISTORY: The patient is a 80year old  male  Chest pain status: no chest pain  Other symptoms: dyspnea  Coronary artery disease risk factors: dyslipidemia, hypertension, and diabetes mellitus  Cardiovascular history: coronary   artery disease, prior myocardial infarction, mitral valve disease, aortic valve disease, and transient ischemic attack  Prior cardiovascular procedures: percutaneous transluminal coronary angioplasty (on 2017- LPDA stent) and   angioplasty of left anterior descending (on Dec  2008- stent)  Co-morbidity: history of renal disease and obesity   Medications: a beta blocker, a calcium channel blocker, an ACE inhibitor/ARB, aspirin, a lipid lowering agent, an   antihypertensive agent, and diabetic medications  PHYSICAL EXAM: Baseline physical exam screening: normal      REST ECG: Normal sinus rhythm  Sinus bradycardia  Inferior wall MI, age indeterminate  Nonspecific ST and T wave abnormalities were present  PROCEDURE: The study was performed at [address] [place] [address]  A regadenoson infusion pharmacologic stress test was performed  Gated SPECT myocardial perfusion imaging was performed after stress and at rest  Systolic blood pressure   was 120 mmHg, at the start of the study  Diastolic blood pressure was 74 mmHg, at the start of the study  The heart rate was 49 bpm, at the start of the study  Regadenoson protocol:   HR bpm SBP mmHg DBP mmHg Symptoms Rhythm/conduct   Baseline 49 120 74 none sinus antonio, rare PAC's   Immediate 68 114 78 none NSR, rare PAC's, occasional PVC's   1 min 63 -- -- none NSR, rare PVC's   2 min 61 136 74 none NSR, frequent PVC's     STRESS SUMMARY: Duration of pharmacologic stress was 3 min  Maximal heart rate during stress was 68 bpm  The rate-pressure product for the peak heart rate and blood pressure was 7752  There was no chest pain during stress  The stress test   was terminated due to protocol completion  The stress ECG was negative for ischemia  Arrhythmia during stress: isolated premature ventricular beats  ISOTOPE ADMINISTRATION:   Resting isotope administration Stress isotope administration   Agent Tetrofosmin Tetrofosmin   Dose 10 05 mCi 31 96 mCi   Date 03/23/2017 03/23/2017     The radiopharmaceutical was injected at the peak effect of pharmacologic stress  MYOCARDIAL PERFUSION IMAGING:   The image quality was good  The left ventricle was mildly dilated  The TID ratio was 1 10  GATED SPECT:   The calculated left ventricular ejection fraction was 47 %  There was moderately reduced myocardial thickening and motion of the inferior wall of the left ventricle  SUMMARY:   - Stress results:  There was no chest pain during stress  - ECG conclusions: The stress ECG was negative for ischemia    - Gated SPECT: The calculated left ventricular ejection fraction was 47 %  There was moderately reduced myocardial thickening and motion of the inferior wall of the left ventricle  IMPRESSIONS: Abnormal study  Moderate size, Moderate to severe intensity fixed inferior and inferoapical defect  No ischemia  Left ventricular systolic function was mildly reduced, with regional wall motion abnormalities       Prepared and signed by     Brii Willson MD   Signed 03/23/2017 19:56:04

## 2018-01-13 VITALS
HEART RATE: 88 BPM | WEIGHT: 227 LBS | BODY MASS INDEX: 32.5 KG/M2 | OXYGEN SATURATION: 98 % | SYSTOLIC BLOOD PRESSURE: 158 MMHG | HEIGHT: 70 IN | DIASTOLIC BLOOD PRESSURE: 82 MMHG

## 2018-01-13 VITALS
DIASTOLIC BLOOD PRESSURE: 68 MMHG | HEART RATE: 58 BPM | OXYGEN SATURATION: 97 % | WEIGHT: 226 LBS | BODY MASS INDEX: 32.43 KG/M2 | SYSTOLIC BLOOD PRESSURE: 138 MMHG

## 2018-01-13 VITALS
WEIGHT: 219 LBS | HEART RATE: 64 BPM | BODY MASS INDEX: 31.35 KG/M2 | TEMPERATURE: 98.2 F | HEIGHT: 70 IN | DIASTOLIC BLOOD PRESSURE: 78 MMHG | SYSTOLIC BLOOD PRESSURE: 126 MMHG

## 2018-01-14 VITALS
DIASTOLIC BLOOD PRESSURE: 76 MMHG | BODY MASS INDEX: 31.64 KG/M2 | OXYGEN SATURATION: 96 % | WEIGHT: 221 LBS | HEART RATE: 58 BPM | HEIGHT: 70 IN | SYSTOLIC BLOOD PRESSURE: 130 MMHG

## 2018-01-14 VITALS
HEART RATE: 70 BPM | BODY MASS INDEX: 33.27 KG/M2 | WEIGHT: 232.38 LBS | HEIGHT: 70 IN | DIASTOLIC BLOOD PRESSURE: 70 MMHG | SYSTOLIC BLOOD PRESSURE: 156 MMHG

## 2018-01-15 NOTE — RESULT NOTES
Verified Results  (1) PTH N-TERMINAL (INTACT) [id number] 10:19AM Dilcia Shaw     Test Name Result Flag Reference   PARATHYROID HORMONE INTACT 78 9 pg/mL H 14 0-72 0

## 2018-01-16 ENCOUNTER — GENERIC CONVERSION - ENCOUNTER (OUTPATIENT)
Dept: OTHER | Facility: OTHER | Age: 83
End: 2018-01-16

## 2018-01-16 NOTE — PROGRESS NOTES
Assessment    1  Tobacco non-user (V49 89) (Z78 9)    Plan  Hypertension    · Metoprolol Succinate ER 25 MG Oral Tablet Extended Release 24 Hour; take 1  tablet by mouth twice a day  Proteinuria, Renal insufficiency    · The plan of care for urinary incontinence is detailed in the plan and/or discussion section  of today's note ; Status:Complete;   Done: 62DTR0795  Type 2 diabetes mellitus    · GlyBURIDE 5 MG Oral Tablet; TAKE 2 TABLETS BY MOUTH IN THE MORNING  AND TAKE 1 TABLET IN THE EVENING    Discussion/Summary    Health safety issues reviewed  History of Present Illness  Welcome to Medicare and Wellness Visits: The patient is being seen for the subsequent annual wellness visit  Medicare Screening and Risk Factors   Medicare Screening Tests Risk Questions   Abdominal aortic aneurysm risk assessment: none indicated  Drug and Alcohol Use: The patient has never smoked cigarettes  The patient reports never drinking alcohol  He has never used illicit drugs  Diet and Physical Activity: Current diet includes well balanced meals, 1 servings of fruit per day, 0 servings of vegetables per day, 2 servings of meat per day, 0 servings of whole grains per day, 1 servings of simple carbohydrates per day, 1 servings of dairy products per day, 3 cups of coffee per day, 0 cups of tea per day, 0 cans of regular soda per day and 0 cans of diet soda per day  The patient does not exercise  Exercise: walking  Mood Disorder and Cognitive Impairment Screening: PHQ-9 Depression Scale   Over the past 2 weeks, how often have you been bothered by the following problems? 1 ) Little interest or pleasure in doing things? Not at all    2 ) Feeling down, depressed or hopeless? Not at all    3 ) Trouble falling asleep or sleeping too much? Not at all    4 ) Feeling tired or having little energy? Several days  5 ) Poor appetite or overeating?  Not at all    6 ) Feeling bad about yourself, or that you are a failure, or have let yourself or your family down? Not at all    7 ) Trouble concentrating on things, such as reading a newspaper or watching television? Not at all    8 ) Moving or speaking so slowly that other people could have noticed, or the opposite, moving or speaking faster than usual? Not at all    9 ) Thoughts that you would be off dead or of hurting yourself in some way? Not at all  Functional Ability/Level of Safety: Hearing is normal bilaterally, normal in the right ear and normal in the left ear  He does not use a hearing aid  Fall risk factors: The patient fell 0 times in the past 12 months  Advance Directives: Advance directives: living will and durable power of  for health care directives, but no advance directives  Co-Managers and Medical Equipment/Suppliers: See Patient Care Team   Preventive Quality Program 65 and Older: Falls Risk: The patient fell 1 times in the past 12 months  The most recent fall occurred outdoors  The fall was preceded by The dog pulled him over  The patient currently has no urinary incontinence symptoms  Date of last glaucoma screen was October 2016      Patient Care Team    Care Team Member Role Specialty Office Number   Adele WILSON  Specialist Cardiology [phone]   Sage Wray  Internal Medicine [phone]   Niki WILSON  Specialist Nephrology [phone]   Leola Duff MD Specialist Ophthalmology [phone]     Active Problems    1  2-vessel coronary artery disease (414 00) (I25 10)   2  Abdominal pain, LLQ (left lower quadrant) (789 04) (R10 32)   3  Active advance directive (V49 89) (Z78 9)   4  Acute anterior circulation TIA (435 9) (G45 8)   5  Acute ST segment elevation MI (410 90) (I21 3)   6  Aortic valve disorder (424 1) (I35 9)   7  Backache (724 5) (M54 9)   8  Benign hypertension with chronic kidney disease (403 10,585 9) (I12 9)   9  Changing skin lesion (709 9) (L98 9)   10  Chest pain (786 50) (R07 9)   11   CKD (chronic kidney disease), stage 3 (moderate) (585 3) (N18 3)   12  Coronary arteriosclerosis (414 00) (I25 10)   13  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)   14  Dyspnea on effort (786 09) (R06 09)   15  Encounter for monitoring antiplatelet therapy (K03 49,D45 74) (Z51 81,Z79 02)   16  Hematuria (599 70) (R31 9)   17  Hyperlipidemia (272 4) (E78 5)   18  Hypertension (401 9) (I10)   19  Limb pain (729 5) (M79 609)   20  Obesity (278 00) (E66 9)   21  Proteinuria (791 0) (R80 9)   22  Renal insufficiency (593 9) (N28 9)   23  S/P cardiac cath (V45 89) (Z98 890)   24  Screening for genitourinary condition (V81 6) (Z13 89)   25  Type 2 diabetes mellitus (250 00) (E11 9)   26  Vitamin D deficiency (268 9) (E55 9)    Past Medical History    · History of Benign prostatic hypertrophy with lower urinary tract symptoms (LUTS)  (600 01) (N40 1)   · History of Colonoscopy (Fiberoptic)   · History of Coronary arteriosclerosis (414 00) (I25 10)   · History of aortic valve disorder (V12 59) (Z86 79)   · History of hyperlipidemia (V12 29) (Z86 39)   · History of hypertension (V12 59) (Z86 79)   · History of shortness of breath (V13 89) (W64 135)   · History of transient cerebral ischemia (V12 54) (Z86 73)   · History of Neuralgia (729 2) (M79 2)   · History of Type 2 diabetes mellitus (250 00) (E11 9)    Surgical History    · History of Cardiac Cath Procedure Outcome: Successful   · History of Cath Stent Placement   · History of Coronary Artery Surgery   · History of Hand Surgery   · History of Hip Replacement    Family History  Mother    · Family history of Family Health Status Of Mother -   Father    · Family history of Chronic Obstructive Pulmonary Disease   · Family history of Family Health Status Of Father -     Social History    · Active advance directive (V49 89) (Z78 9)   · Denied: History of Alcohol Use (History)   · Never A Smoker    Current Meds   1   AmLODIPine Besylate 10 MG Oral Tablet; TAKE 1 TABLET BY MOUTH IN THE   MORNING; Therapy: 66HIC9672 to (Evaluate:13Ngw6294)  Requested for: 57XME9456; Last   Rx:86Fzs4296 Ordered   2  Aspirin 81 MG Oral Tablet Delayed Release; Therapy: (Recorded:16Sdf5276) to Recorded   3  Clopidogrel Bisulfate 75 MG Oral Tablet; take one tablet by mouth one time daily; Therapy: 99TVU9525 to (SGBEOSKE:59YVY5875)  Requested for: 52AZM8298; Last   Rx:16Xnh5488 Ordered   4  Doxazosin Mesylate 4 MG Oral Tablet; take one tablet by mouth one time daily; Therapy: 52CXG4457 to (Evaluate:13Ubd6947)  Requested for: 50UZG1953; Last   Rx:89Gom8438 Ordered   5  GlyBURIDE 5 MG Oral Tablet; TAKE 2 TABLETS BY MOUTH IN THE MORNING AND   TAKE 1 TABLET IN THE EVENING; Therapy: 69MLG8808 to (Lucia Burk)  Requested for: 79Wed9205; Last   Rx:37Hkf1868 Ordered   6  Lisinopril 40 MG Oral Tablet; TAKE ONE TABLET BY MOUTH IN THE MORNING AND   ONE-HALF IN THE P M;   Therapy: 47Jms6534 to (Evaluate:69Mzh4615)  Requested for: 90Afi9879; Last   Rx:03Pcq7209 Ordered   7  Metoprolol Succinate ER 25 MG Oral Tablet Extended Release 24 Hour; take 1 tablet by   mouth twice a day; Therapy: 71KHD4164 to (Evaluate:17Ezb4779)  Requested for: 60IOX8290; Last   Rx:30Xet7371 Ordered   8  OneTouch Ultra Blue In Vitro Strip; USE TO TEST BLOOD SUGAR TWICE DAILY; Therapy: 96GZQ0607 to (Evaluate:78Anc4477)  Requested for: 54LPV9290; Last   Rx:23Smx8398 Ordered   9  OneTouch Ultra Mini KIT; USE BID DAILY ; Therapy: (NKPBGCHF:04HIY1569) to Recorded   10  Oxybutynin Chloride 5 MG Oral Tablet; take one tablet by mouth one time daily; Therapy: 12Uxz6006 to (Evaluate:57Cwi7346)  Requested for: 98Dqo1367; Last    Rx:28Qfz4271 Ordered    Allergies    1  CeleBREX CAPS   2  Dilaudid TABS   3  Statins    Immunizations   ** Printed in Appendix #1 below       Vitals  Signs    Heart Rate: 58  Systolic: 465  Diastolic: 76  Height: 5 ft 10 in  Weight: 221 lb   BMI Calculated: 31 71  BSA Calculated: 2 18  O2 Saturation: 96    Results/Data  PHQ-9 Adult Depression Screening 60Yrh6830 11:51AM User, s     Test Name Result Flag Reference   PHQ-9 Adult Depression Score 1     Over the last two weeks, how often have you been bothered by any of the following problems? Little interest or pleasure in doing things: Not at all - 0  Feeling down, depressed, or hopeless: Not at all - 0  Trouble falling or staying asleep, or sleeping too much: Not at all - 0  Feeling tired or having little energy: Several days - 1  Poor appetite or over eating: Not at all - 0  Feeling bad about yourself - or that you are a failure or have let yourself or your family down: Not at all - 0  Trouble concentrating on things, such as reading the newspaper or watching television: Not at all - 0  Moving or speaking so slowly that other people could have noticed  Or the opposite -  being so fidgety or restless that you have been moving around a lot more than usual: Not at all - 0  Thoughts that you would be better off dead, or of hurting yourself in some way: Not at all - 0   PHQ-9 Adult Depression Screening Negative     PHQ-9 Difficulty Level Not difficult at all     PHQ-9 Severity Minimal Depression       Falls Risk Assessment (Dx Z13 89 Screen for Neurologic Disorder) 85XKY4118 11:51AM User, [employer]     Test Name Result Flag Reference   Falls Risk      No falls in the past year       Health Management  Health Maintenance   COLONOSCOPY; every 10 years; Next Due: 20UOR6255; Overdue    Future Appointments    Date/Time Provider Specialty Site   10/02/2017 08:20 TARAS Logan   Cardiology  [address]     Signatures   Electronically signed by : Keegan Hatch DO; Sep  7 2017 12:14PM EST                       (Author)    Appendix #1     Patient: Edgardo Rai ; : 1935; MRN: [id number]      1 2 3    Influenza  never Temporarily Deferred: Pt requests deferral, Pt refuses, never, As of: 2016, Defer for 2 Years Permanently Deferred: Medical Deferral, per pt never gets shots, 51VHA1453    PPSV  Unknown Permanently Deferred: Medical Deferral, per pt never gets shots, 36YPY6342 never    Tdap  Unknown      Zoster  never

## 2018-01-17 ENCOUNTER — GENERIC CONVERSION - ENCOUNTER (OUTPATIENT)
Dept: INTERNAL MEDICINE CLINIC | Facility: CLINIC | Age: 83
End: 2018-01-17

## 2018-01-17 NOTE — PROGRESS NOTES
Assessment    1  Hypertension (401 9) (I10)   2  STEMI (ST elevation myocardial infarction) (410 90) (I21 3)   3  Type 2 diabetes mellitus (250 00) (E11 9)    Plan  Hyperlipidemia    · From  Doxazosin Mesylate 4 MG Oral Tablet take one tablet by mouth one time daily To  Doxazosin Mesylate 4 MG Oral Tablet (Cardura) TAKE 1 TABLET Bedtime  NSTEMI, initial episode of care    · GlipiZIDE 5 MG Oral Tablet; TAKE 1 TABLET TWICE DAILY  Type 2 diabetes mellitus    · GlyBURIDE 5 MG Oral Tablet    Discussion/Summary  Discussion Summary:   Patient doing well s/p stenting  Continue current medications  Explained to patient the importance of getting up from sitting or standing slowly so as not to cause hypotension/syncope especially at night  Changed his Glyburide to Glipizide due to insurance coverage  He will continue to monitor his glucose at home  He has follow up with Dr Arnav Howell next week and will follow up with Dr Analilia Carrera in March  Chief Complaint  Chief Complaint Free Text Note Form: Hospital follow up      History of Present Illness  HPI: Patient here for follow up from recent hospitalization  He saw Dr Joselyn Ge on 2/9 with complaint of epigastric discomfort and shortness of breath  He was found to have STEMI and was sent to the ED  He underwent cardiac cath and had CONCEPCION to left posterior descending artery  He had an echo which showed EF of 55% and mild aortic stenosis  He denies any chest pain or shortness of breath  He has been experiencing dizziness when he stands up too quickly  Otherwise he is feeling well  Medicare is no longer covering his Glyburide, needs to be changed to Glipizide or Glimepiride  Review of Systems  Complete-Male:   Constitutional: No fever or chills, feels well, no tiredness, no recent weight gain or weight loss  ENT: no complaints of earache, no hearing loss, no nosebleeds, no nasal discharge, no sore throat, no hoarseness     Cardiovascular: No complaints of slow heart rate, no fast heart rate, no chest pain, no palpitations, no leg claudication, no lower extremity  Respiratory: No complaints of shortness of breath, no wheezing, no cough, no SOB on exertion, no orthopnea or PND  Gastrointestinal: No complaints of abdominal pain, no constipation, no nausea or vomiting, no diarrhea or bloody stools  Genitourinary: No complaints of dysuria, no incontinence, no hesitancy, no nocturia, no genital lesion, no testicular pain  Musculoskeletal: No complaints of arthralgia, no myalgias, no joint swelling or stiffness, no limb pain or swelling  Neurological: No compliants of headache, no confusion, no convulsions, no numbness or tingling, no dizziness or fainting, no limb weakness, no difficulty walking  Active Problems    1  Abdominal pain, LLQ (left lower quadrant) (789 04) (R10 32)   2  Acute anterior circulation TIA (435 9) (G45 8)   3  Aortic valve disorder (424 1) (I35 9)   4  Atherosclerotic heart disease of native coronary artery without angina pectoris (414 01) (I25 10)   5  Backache (724 5) (M54 9)   6  Chest pain (786 50) (R07 9)   7  Coronary arteriosclerosis (414 00) (I25 10)   8  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)   9  Hematuria (599 70) (R31 9)   10  Hyperlipidemia (272 4) (E78 5)   11  Hypertension (401 9) (I10)   12  NSTEMI, initial episode of care (410 71) (I21 4)   13  Obesity (278 00) (E66 9)   14  Renal insufficiency (593 9) (N28 9)   15  STEMI (ST elevation myocardial infarction) (410 90) (I21 3)   16  Type 2 diabetes mellitus (250 00) (E11 9)    Past Medical History    1  History of Benign prostatic hypertrophy with lower urinary tract symptoms (LUTS) (600 01) (N40 1)   2  History of Colonoscopy (Fiberoptic)   3  History of Coronary arteriosclerosis (414 00) (I25 10)   4  History of aortic valve disorder (V12 59) (Z86 79)   5  History of hyperlipidemia (V12 29) (Z86 39)   6  History of hypertension (V12 59) (Z86 79)   7   History of shortness of breath (V13 89) (Z87 898)   8  History of transient cerebral ischemia (V12 54) (Z86 73)   9  History of Limb pain (729 5) (M79 609)   10  History of Neuralgia (729 2) (M79 2)   11  History of Type 2 diabetes mellitus (250 00) (E11 9)    Surgical History    1  History of Cardiac Cath Procedure Outcome: Successful   2  History of Cath Stent Placement   3  History of Coronary Artery Surgery   4  History of Hand Surgery   5  History of Hip Replacement    Family History    1  Family history of Family Health Status Of Mother -     2  Family history of Chronic Obstructive Pulmonary Disease   3  Family history of Family Health Status Of Father -     Social History    · Denied: History of Alcohol Use (History)   · Never A Smoker    Current Meds   1  AmLODIPine Besylate 10 MG Oral Tablet; Take One tablet by mouth in the morning; Therapy: 92ULX9545 to (Evaluate:2016)  Requested for: 02IMR2585; Last Rx:21Kyb9168   Ordered   2  Aspirin 325 MG Oral Tablet Delayed Release; Take 1 tablet daily; Therapy: (Recorded:40Ocz9551) to Recorded   3  Clopidogrel Bisulfate 75 MG Oral Tablet; TAKE 1 TABLET DAILY  Requested for: 71TCP6754; Last   Rx:36Ixv3491 Ordered   4  Doxazosin Mesylate 4 MG Oral Tablet; take one tablet by mouth one time daily; Therapy: 88JCU3274 to (Evaluate:55Nqw2351)  Requested for: 62KHL9547; Last Rx:25Qtu6505   Ordered   5  GlyBURIDE 5 MG Oral Tablet; TAKE TWO TABLETS BY MOUTH IN THE MORNING AND ONE TABLET IN   THE EVENING; Therapy: 33Dro2166 to (Evaluate:66Hac2780)  Requested for: 18DKQ0173; Last Rx:04Pkx5477   Ordered   6  Isosorbide Dinitrate 30 MG Oral Tablet Recorded   7  Lisinopril 40 MG Oral Tablet; TAKE 1 TAB BY MOUTH DAILY  Requested for: 45ZGK2783; Last   Rx:88Kha4766 Ordered   8  Metoprolol Succinate ER 25 MG Oral Tablet Extended Release 24 Hour; take one tablet by mouth   twice daily; Therapy: 70ELX8277 to (Evaluate:2016)  Requested for: 01Uoj0912;  Last Rx:89Kpf3424 Ordered   9  OneTouch Ultra Blue In Vitro Strip; TEST TWICE DAILY; Therapy: 95HQX2971 to (Evaluate:2016)  Requested for: 96UPW7300; Last Rx:90Lfh2348   Ordered   10  OneTouch Ultra Mini KIT; USE BID DAILY ; Therapy: (WVIOTNEY:97QAD8728) to Recorded   11  Oxybutynin Chloride 5 MG Oral Tablet; TAKE ONE TABLET PO QD  Requested for: 20KBH5256; Last    Rx:61Ljo3401 Ordered   12  Pantoprazole Sodium 40 MG Oral Tablet Delayed Release Recorded    Allergies    1  CeleBREX CAPS   2  Dilaudid TABS   3  Statins    Vitals  Vital Signs [Data Includes: Current Encounter]    ** Printed in Appendix #1 below  Physical Exam    Constitutional   General appearance: No acute distress, well appearing and well nourished  Pulmonary   Respiratory effort: No increased work of breathing or signs of respiratory distress  Auscultation of lungs: Clear to auscultation, equal breath sounds bilaterally, no wheezes, no rales, no rhonci  Cardiovascular   Auscultation of heart: Normal rate and rhythm, normal S1 and S2, without murmurs  Examination of extremities for edema and/or varicosities: Normal     Abdomen   Abdomen: Non-tender, no masses  Musculoskeletal   Gait and station: Normal     Psychiatric   Orientation to person, place and time: Normal     Mood and affect: Normal          Health Management  Health Maintenance   COLONOSCOPY; every 10 years; Next Due: 04THQ0760;  Overdue    Future Appointments    Date/Time Provider Specialty Site   2016 08:15 AM Monica Macdonald DO Internal Medicine [place] [address]  CT     Signatures   Electronically signed by : Ricki Castillo, [facility]; 2016 11:17AM EST                       (Author)    Electronically signed by : Enzo French DO; 2016 12:35PM EST                       (Co-author)    Appendix #1     Vital Signs [Data Includes: Current Encounter]   Patient: Tylor Mooney ; : 1935; MRN: [id number]      Recorded: 19UBH8546 11:11AM Recorded: 42VBJ5476 09:51AM Recorded: 62WMO2310 09:31AM   Temperature    98 F   Heart Rate    64   Systolic 009, Sitting 201, Supine 799 727   Diastolic 72, Sitting 72, Supine 72 72   Height    5 ft 10 in   Weight    226 lb    BMI Calculated    32 43   BSA Calculated    2 2

## 2018-01-17 NOTE — MISCELLANEOUS
This is to state that this patient has been followed by me from a cardiovascular standpoint  Patient has no specific contraindication from a cardiac standpoint to proceed with the planned surgery  Patient can stop  Plavix 7 days prior to hip surgery  Patient had an echocardiogram this year which showed normal ejection fraction with mild aortic stenosis  Patient also had a nuclear stress test which showed a fixed defect but no reversible ischemia  Patient presents  moderate risk for the surgery based on his age and comorbidities  If you have any specific questions however, please do not hesitate to contact me in person  Electronically signed by:Katlin WILSON    Oct 27 2017  2:32PM EST

## 2018-01-17 NOTE — CONSULTS
Chief Complaint  pre-op clearance      History of Present Illness  Pre-Op Visit (Brief): The patient is being seen for a preoperative visit  Surgical Risk Assessment:   Prior Anesthesia: He had prior anesthesia, no prior adverse reaction to edidural anesthesia, no prior adverse reaction to spinal anesthesia and no prior adverse reaction to general anesthesia  Exercise Capacity: able to walk four blocks without symptoms and able to walk two flights of stairs without symptoms  Lifestyle Factors: denies alcohol use, denies tobacco use and denies illegal drug use  Symptoms: no easy bleeding, no easy bruising, no frequent nosebleeds, no chest pain, no cough, no dyspnea, no edema, no palpitations and no wheezing  HPI: Pt comes for pre-op clearance  He will have a right total hip replacement  He feels well and has no acute complaints   Osteoarthritis (Follow-Up): The patient states his osteoarthritis has been worse since the last visit  Osteoarthritis complications include hip arthroplasty  Symptoms: worsened hip pain  Review of Systems    Constitutional: no fever, not feeling poorly, no chills and not feeling tired  ENT: no complaints of earache, no hearing loss, no nosebleeds, no nasal discharge, no sore throat, no hoarseness  Cardiovascular: the heart rate was not slow, no chest pain, the heart rate was not fast and no palpitations  Respiratory: No complaints of shortness of breath, no wheezing, no cough, no SOB on exertion, no orthopnea or PND  Gastrointestinal: No complaints of abdominal pain, no constipation, no nausea or vomiting, no diarrhea or bloody stools  Musculoskeletal: arthralgias and joint stiffness, but as noted in HPI  ROS reviewed  Active Problems    1  2-vessel coronary artery disease (414 00) (I25 10)   2  Abdominal pain, LLQ (left lower quadrant) (789 04) (R10 32)   3  Active advance directive (V49 89) (Z78 9)   4  Acute anterior circulation TIA (435 9) (G45 8)   5  Acute ST segment elevation MI (410 90) (I21 3)   6  Aortic valve disorder (424 1) (I35 9)   7  Backache (724 5) (M54 9)   8  Benign hypertension with chronic kidney disease (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 10,585 9) (I12 9)   9  Changing skin lesion (709 9) (L98 9)   10  Chest pain (786 50) (R07 9)   11  Coronary arteriosclerosis (414 00) (I25 10)   12  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)   13  Dyspnea on effort (786 09) (R06 09)   14  Encounter for monitoring antiplatelet therapy (Q64 38,N32 88) (Z51 81,Z79 02)   15  Hematuria (599 70) (R31 9)   16  Hyperlipidemia (272 4) (E78 5)   17  Hypertension (401 9) (I10)   18  Limb pain (729 5) (M79 609)   19  No advance directive on file (V49 89) (Z78 9)   20  Obesity (278 00) (E66 9)   21  Proteinuria (791 0) (R80 9)   22  Renal insufficiency (593 9) (N28 9)   23  S/P cardiac cath (V45 89) (Z98 890)   24  Screening for genitourinary condition (V81 6) (Z13 89)   25  Screening for skin condition (V82 0) (Z13 89)   26  Seborrheic keratosis (702 19) (L82 1)   27  Stage III chronic kidney disease (585 3) (N18 3)   28  Type 2 diabetes mellitus (250 00) (E11 9)   29  Vitamin D deficiency (268 9) (E55 9)    Past Medical History    · History of Benign prostatic hypertrophy with lower urinary tract symptoms (LUTS)  (600 01) (N40 1)   · History of Colonoscopy (Fiberoptic)   · History of Coronary arteriosclerosis (414 00) (I25 10)   · History of aortic valve disorder (V12 59) (Z86 79)   · History of hyperlipidemia (V12 29) (Z86 39)   · History of hypertension (V12 59) (Z86 79)   · History of shortness of breath (V13 89) (Q65 062)   · History of transient cerebral ischemia (V12 54) (Z86 73)   · History of Neuralgia (729 2) (M79 2)   · History of Type 2 diabetes mellitus (250 00) (E11 9)    The active problems and past medical history were reviewed and updated today        Surgical History    · History of Cardiac Cath Procedure Outcome: Successful   · History of Cath Stent Placement   · History of Coronary Artery Surgery   · History of Hand Surgery   · History of Hip Replacement    The surgical history was reviewed and updated today  Family History    · Family history of Family Health Status Of Mother -     · Family history of Chronic Obstructive Pulmonary Disease   · Family history of Family Health Status Of Father -     The family history was reviewed and updated today  Social History    · Active advance directive (V49 89) (Z78 9)   · Denied: History of Alcohol Use (History)   · Never A Smoker   · No advance directive on file (V49 89) (Z78 9)   · Tobacco non-user (V49 89) (Z78 9)  The social history was reviewed and updated today  Current Meds   1  AmLODIPine Besylate 10 MG Oral Tablet; TAKE 1 TABLET BY MOUTH IN THE MORNING; Therapy: 95CHV6145 to (Evaluate:45Gpb6773)  Requested for: 39WRA4506; Last   Rx:2017 Ordered   2  Aspirin 81 MG Oral Tablet Delayed Release; Therapy: (Recorded:46Wxv4722) to Recorded   3  Clopidogrel Bisulfate 75 MG Oral Tablet; take one tablet by mouth one time daily; Therapy: 47OFM7657 to (MESOIJQB:66PMJ8016)  Requested for: 25UZW3819; Last   Rx:2017 Ordered   4  Doxazosin Mesylate 4 MG Oral Tablet; take one tablet by mouth one time daily; Therapy: 98YSJ7214 to (Evaluate:2018)  Requested for: 12IOK0701; Last   Rx:19Kqs6043 Ordered   5  GlyBURIDE 5 MG Oral Tablet; TAKE 2 TABLETS BY MOUTH IN THE MORNING AND TAKE   1 TABLET IN THE EVENING; Therapy: 33SNH1067 to (JXMXCY:35PFW9778)  Requested for: 21Lgc5878; Last   Rx:2017 Ordered   6  Lisinopril 40 MG Oral Tablet; TAKE ONE TABLET BY MOUTH IN THE MORNING AND   ONE-HALF IN THE P M;   Therapy: 40Yuf3439 to (Evaluate:99Vcs0488)  Requested for: 44Qjx8608; Last   Rx:27Ovk4839 Ordered   7  Metoprolol Succinate ER 25 MG Oral Tablet Extended Release 24 Hour; take 1 tablet by   mouth twice a day;    Therapy: 44GAA0493 to (Pritesh Saini)  Requested for: 31Chz0946; Last Rx: 86LDN4092 Ordered   8  OneTouch Ultra Blue In Vitro Strip; USE TO TEST BLOOD SUGAR TWICE DAILY; Therapy: 91LLG3658 to (Evaluate:31Jgi4946)  Requested for: 82JDQ0376; Last   Rx:14Bdq3706 Ordered   9  OneTouch Ultra Mini KIT; USE BID DAILY ; Therapy: (FGBMWBMA:46OLO4404) to Recorded   10  Oxybutynin Chloride 5 MG Oral Tablet; take one tablet by mouth one time daily; Therapy: 57Uzu9219 to (Evaluate:64Udo1325)  Requested for: 66Hmf1340; Last    Rx:63Anj5762 Ordered   11  TraMADol HCl TABS; Therapy: (Recorded:57Khj8516) to Recorded    The medication list was reviewed and updated today  Allergies    1  CeleBREX CAPS   2  Dilaudid TABS   3  Statins    Vitals  Signs    Temperature: 98 2 F  Heart Rate: 64  Systolic: 237  Diastolic: 78  Height: 5 ft 10 in  Weight: 219 lb   BMI Calculated: 31 42  BSA Calculated: 2 17    Physical Exam    Constitutional   General appearance: No acute distress, well appearing and well nourished  Eyes   Pupils and irises: Equal, round, reactive to light  Ears, Nose, Mouth, and Throat   External inspection of ears and nose: Normal     Otoscopic examination: Tympanic membranes translucent with normal light reflex  Canals patent without erythema  Hearing: Normal     Nasal mucosa, septum, and turbinates: Normal without edema or erythema  Oropharynx: Normal with no erythema, edema, exudate or lesions  Neck   Neck: Supple, symmetric, trachea midline, no masses  Pulmonary   Respiratory effort: No increased work of breathing or signs of respiratory distress  Auscultation of lungs: Clear to auscultation  Cardiovascular   Auscultation of heart: Normal rate and rhythm, normal S1 and S2, no murmurs  Pedal pulses: 2+ bilaterally  Examination of extremities for edema and/or varicosities: Normal     Abdomen   Abdomen: Non-tender, no masses  Liver and spleen: No hepatomegaly or splenomegaly      Lymphatic   Palpation of lymph nodes in neck: No lymphadenopathy  Musculoskeletal   Gait and station: Abnormal   using a crutch  Psychiatric   Judgment and insight: Normal     Orientation to person, place and time: Normal     Mood and affect: Normal        Assessment    1  Osteoarthritis of hip (715 95) (M16 9)   2  Preoperative clearance (V72 84) (Z01 818)    Discussion/Summary  Surgical Clearance: He is at a LOW TO MODERATE risk from a cardiovascular standpoint at this time without any additional cardiac testing  Reevaluation needed, if he should present with symptoms prior to surgery/procedure  Pre-op clearance- Pt received cardiovascular clearance from Dr Cheyanne Fisher  Pt has CKD 3 and follows with nephrology  His labs were reviewed  Most recent Creatinine 2 0  I spoke to Dr Joseph Gardiner, covering for Dr Heladio Gonzales, who gave verbal clearance for the pt to proceed with the planned surgery, however he recommends the surgeon request a nephrology consult perioperatively  Pt is cleared for planned surgery  Case d/w Dr Jeremias Hills who agrees with the A/P  End of Encounter Meds    1  Clopidogrel Bisulfate 75 MG Oral Tablet (Plavix); take one tablet by mouth one time daily; Therapy: 99ZQS8758 to (OPLIJOJQ:26WON8742)  Requested for: 95KQP4532; Last   Rx:09Jan2017 Ordered    2  Oxybutynin Chloride 5 MG Oral Tablet; take one tablet by mouth one time daily; Therapy: 95Hfz3902 to (Evaluate:10Iqb5034)  Requested for: 94Gzh7814; Last   Rx:02Yuk2348 Ordered    3  AmLODIPine Besylate 10 MG Oral Tablet (Norvasc); TAKE 1 TABLET BY MOUTH IN THE   MORNING; Therapy: 98TVX7371 to (Evaluate:19Yss7193)  Requested for: 00VRV7798; Last   Rx:46Qmq0173 Ordered   4  Doxazosin Mesylate 4 MG Oral Tablet (Cardura); take one tablet by mouth one time daily; Therapy: 06FBC0942 to (Evaluate:60Dab6591)  Requested for: 27LAB8255; Last   Rx:14Zny7423 Ordered    5   Lisinopril 40 MG Oral Tablet; TAKE ONE TABLET BY MOUTH IN THE MORNING AND   ONE-HALF IN THE P M;   Therapy: 88ADX7416 to (Evaluate:51Mgm5071)  Requested for: 42Eyc5391; Last   Rx:19Kxn0947 Ordered   6  Metoprolol Succinate ER 25 MG Oral Tablet Extended Release 24 Hour; take 1 tablet by   mouth twice a day; Therapy: 10EQL8940 to (Christelle Kowalski)  Requested for: 66Ejv0296; Last   Rx:64Jym1588 Ordered    7  GlyBURIDE 5 MG Oral Tablet; TAKE 2 TABLETS BY MOUTH IN THE MORNING AND TAKE   1 TABLET IN THE EVENING; Therapy: 79UPM8840 to (WONSJTFE:38EFD2049)  Requested for: 56Wue0766; Last   Rx:82Kmd2688 Ordered   8  OneTouch Ultra Blue In Vitro Strip; USE TO TEST BLOOD SUGAR TWICE DAILY; Therapy: 84GJC3462 to (Evaluate:92Axl9236)  Requested for: 57JKW5567; Last   Rx:30Qkn8284 Ordered    9  Aspirin 81 MG Oral Tablet Delayed Release; Therapy: (Recorded:67Ngp4895) to Recorded   10  OneTouch Ultra Mini KIT; USE BID DAILY ; Therapy: (HPKNHVYZ:21GAN0498) to Recorded   11  TraMADol HCl TABS;     Therapy: (Recorded:14Wfo0364) to Recorded    Signatures   Electronically signed by : Maria Luisa Ferrell, [facility]; Nov 14 2017  4:12PM EST                       (Author)    Electronically signed by : Douglas Hoang DO; Nov 14 2017  5:48PM EST                       (Author)

## 2018-01-18 NOTE — MISCELLANEOUS
Message  I called the patient the results of his blood work  His creatinine is up to 1 9  He is followed by Dr Ericka Gillette who recently retired  He is going to  a copy of his blood work and taken to the new nephrologist that he is going to be seeing   In the meantime he is going to avoid all over-the-counter medications that could be injurious to his kidney      Signatures   Electronically signed by : Nigel Draper DO; Jan 5 2017  6:38PM EST                       (Author)

## 2018-01-19 ENCOUNTER — GENERIC CONVERSION - ENCOUNTER (OUTPATIENT)
Dept: OTHER | Facility: OTHER | Age: 83
End: 2018-01-19

## 2018-01-22 ENCOUNTER — GENERIC CONVERSION - ENCOUNTER (OUTPATIENT)
Dept: OTHER | Facility: OTHER | Age: 83
End: 2018-01-22

## 2018-01-22 VITALS
HEART RATE: 63 BPM | DIASTOLIC BLOOD PRESSURE: 68 MMHG | OXYGEN SATURATION: 97 % | HEIGHT: 70 IN | SYSTOLIC BLOOD PRESSURE: 138 MMHG

## 2018-01-23 NOTE — MISCELLANEOUS
History of Present Illness  [facility] Communication [facility]: [facility] records were reviewed  He was hospitalized at [facility]  The date of admission: 11/24/17, date of discharge: 12/5/17  Diagnosis: CVA  He was discharged to a rehabilitation center  He did not schedule a follow up appointment  Communication performed and completed by      Active Problems    1  2-vessel coronary artery disease (414 00) (I25 10)   2  Abdominal pain, LLQ (left lower quadrant) (789 04) (R10 32)   3  Active advance directive (V49 89) (Z78 9)   4  Acute anterior circulation TIA (435 9) (G45 8)   5  Acute ST segment elevation MI (410 90) (I21 3)   6  Aortic valve disorder (424 1) (I35 9)   7  Backache (724 5) (M54 9)   8  Benign hypertension with chronic kidney disease (403 10,585 9) (I12 9)   9  Changing skin lesion (709 9) (L98 9)   10  Chest pain (786 50) (R07 9)   11  Coronary arteriosclerosis (414 00) (I25 10)   12  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)   13  Dyspnea on effort (786 09) (R06 09)   14  Encounter for monitoring antiplatelet therapy (L98 47,R31 35) (Z51 81,Z79 02)   15  Hematuria (599 70) (R31 9)   16  Hyperlipidemia (272 4) (E78 5)   17  Hypertension (401 9) (I10)   18  Limb pain (729 5) (M79 609)   19  No advance directive on file (V49 89) (Z78 9)   20  Obesity (278 00) (E66 9)   21  Osteoarthritis of hip (715 95) (M16 9)   22  Preoperative clearance (V72 84) (Z01 818)   23  Proteinuria (791 0) (R80 9)   24  Renal insufficiency (593 9) (N28 9)   25  S/P cardiac cath (V45 89) (Z98 890)   26  Screening for genitourinary condition (V81 6) (Z13 89)   27  Screening for skin condition (V82 0) (Z13 89)   28  Seborrheic keratosis (702 19) (L82 1)   29  Stage III chronic kidney disease (585 3) (N18 3)   30  Type 2 diabetes mellitus (250 00) (E11 9)   31  Vitamin D deficiency (268 9) (E55 9)    Past Medical History    1   History of Benign prostatic hypertrophy with lower urinary tract symptoms (LUTS)   (600 01) (N40 1) 2  History of Colonoscopy (Fiberoptic)   3  History of Coronary arteriosclerosis (414 00) (I25 10)   4  History of aortic valve disorder (V12 59) (Z86 79)   5  History of hyperlipidemia (V12 29) (Z86 39)   6  History of hypertension (V12 59) (Z86 79)   7  History of shortness of breath (V13 89) (Z87 898)   8  History of transient cerebral ischemia (V12 54) (Z86 73)   9  History of Neuralgia (729 2) (M79 2)   10  History of Type 2 diabetes mellitus (250 00) (E11 9)    Surgical History    1  History of Cardiac Cath Procedure Outcome: Successful   2  History of Cath Stent Placement   3  History of Coronary Artery Surgery   4  History of Hand Surgery   5  History of Hip Replacement    Family History  Mother    1  Family history of Family Health Status Of Mother -   Father    2  Family history of Chronic Obstructive Pulmonary Disease   3  Family history of Family Health Status Of Father -     Social History    · Active advance directive (V49 89) (Z78 9)   · Denied: History of Alcohol Use (History)   · Never A Smoker   · No advance directive on file (V49 89) (Z78 9)   · Tobacco non-user (V4 89) (Z78 9)    Current Meds   1  AmLODIPine Besylate 10 MG Oral Tablet; TAKE 1 TABLET BY MOUTH IN THE MORNING; Therapy: 65PDX9381 to (Evaluate:09Eei3570)  Requested for: 08KQA8404; Last   Rx:2017 Ordered   2  Aspirin 81 MG Oral Tablet Delayed Release; Therapy: (Recorded:03All0689) to Recorded   3  Clopidogrel Bisulfate 75 MG Oral Tablet; take one tablet by mouth one time daily; Therapy: 08NGB8177 to ([id number])  Requested for: 60TLA4362; Last   Rx:2017 Ordered   4  Doxazosin Mesylate 4 MG Oral Tablet; take one tablet by mouth one time daily; Therapy: 86LOK8066 to (Evaluate:2018)  Requested for: 11ZRQ3621; Last   Rx:27Wnj8300 Ordered   5  GlyBURIDE 5 MG Oral Tablet; TAKE 2 TABLETS BY MOUTH IN THE MORNING AND TAKE   1 TABLET IN THE EVENING;    Therapy: 14SUZ9702 to (Evaluate:2018) Requested for: 07Sep2017; Last   UE:95VSQ6692 Ordered   6  Lisinopril 40 MG Oral Tablet; TAKE ONE TABLET BY MOUTH IN THE MORNING AND   ONE-HALF IN THE P M;   Therapy: 87Ocp5411 to (Evaluate:76Wcm6135)  Requested for: 43Pag3944; Last   Rx:33Xjk0819 Ordered   7  Metoprolol Succinate ER 25 MG Oral Tablet Extended Release 24 Hour; take 1 tablet by   mouth twice a day; Therapy: 12HMB1624 to (Vanetta Mcburney)  Requested for: 61Hln3148; Last   Rx:07Sep2017 Ordered   8  OneTouch Ultra Blue In Vitro Strip; USE TO TEST BLOOD SUGAR TWICE DAILY; Therapy: 90RWN7257 to (Evaluate:80Szw5886)  Requested for: 75LMW2296; Last   Rx:21Avg4931 Ordered   9  OneTouch Ultra Mini KIT; USE BID DAILY ; Therapy: (NNQKEZSN:13GXY6690) to Recorded   10  Oxybutynin Chloride 5 MG Oral Tablet; take one tablet by mouth one time daily; Therapy: 95Gum8528 to (Evaluate:89Wsp6432)  Requested for: 73Cfa1665; Last    Rx:01Sep2017 Ordered   11  TraMADol HCl TABS; Therapy: (Recorded:14Nov2017) to Recorded    Allergies    1  CeleBREX CAPS   2  Dilaudid TABS   3  Statins    Health Management  Health Maintenance   COLONOSCOPY; every 10 years; Next Due: 18EIM5524; Overdue    Future Appointments    Date/Time Provider Specialty Site   01/11/2018 11:15 AM Monica Macdonald DO Internal Medicine [facility]""   03/14/2018 09:15 AM TARAS Blackwood   Dermatology [facility]     Signatures   Electronically signed by : Nancy Currie, ; Dec  6 2017  2:49PM EST                       (Author)    Electronically signed by : Enzo French DO; Dec  6 2017  4:18PM EST                       (Co-author)

## 2018-01-23 NOTE — MISCELLANEOUS
History of Present Illness  [facility] Communication [facility]: [facility] records were reviewed  He was hospitalized at [facility]  The date of admission: 12/5/17, date of discharge: 12/18/17  Diagnosis: RTHR, right thigh/gluteal hematoma, LUE DVT  He was discharged to a rehabilitation center  He did not schedule a follow up appointment  Communication performed and completed by      Active Problems    1  2-vessel coronary artery disease (414 00) (I25 10)   2  Abdominal pain, LLQ (left lower quadrant) (789 04) (R10 32)   3  Active advance directive (V49 89) (Z78 9)   4  Acute anterior circulation TIA (435 9) (G45 8)   5  Acute ST segment elevation MI (410 90) (I21 3)   6  Aortic valve disorder (424 1) (I35 9)   7  Backache (724 5) (M54 9)   8  Benign hypertension with chronic kidney disease (403 10,585 9) (I12 9)   9  Changing skin lesion (709 9) (L98 9)   10  Chest pain (786 50) (R07 9)   11  Coronary arteriosclerosis (414 00) (I25 10)   12  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)   13  Dyspnea on effort (786 09) (R06 09)   14  Encounter for monitoring antiplatelet therapy (C85 73,Z07 34) (Z51 81,Z79 02)   15  Hematuria (599 70) (R31 9)   16  Hyperlipidemia (272 4) (E78 5)   17  Hypertension (401 9) (I10)   18  Limb pain (729 5) (M79 609)   19  No advance directive on file (V49 89) (Z78 9)   20  Obesity (278 00) (E66 9)   21  Osteoarthritis of hip (715 95) (M16 9)   22  Preoperative clearance (V72 84) (Z01 818)   23  Proteinuria (791 0) (R80 9)   24  Renal insufficiency (593 9) (N28 9)   25  S/P cardiac cath (V45 89) (Z98 890)   26  Screening for genitourinary condition (V81 6) (Z13 89)   27  Screening for skin condition (V82 0) (Z13 89)   28  Seborrheic keratosis (702 19) (L82 1)   29  Stage III chronic kidney disease (585 3) (N18 3)   30  Type 2 diabetes mellitus (250 00) (E11 9)   31  Vitamin D deficiency (268 9) (E55 9)    Past Medical History    1   History of Benign prostatic hypertrophy with lower urinary tract symptoms (LUTS)   (600 01) (N40 1)   2  History of Colonoscopy (Fiberoptic)   3  History of Coronary arteriosclerosis (414 00) (I25 10)   4  History of aortic valve disorder (V12 59) (Z86 79)   5  History of hyperlipidemia (V12 29) (Z86 39)   6  History of hypertension (V12 59) (Z86 79)   7  History of shortness of breath (V13 89) (Z87 898)   8  History of transient cerebral ischemia (V12 54) (Z86 73)   9  History of Neuralgia (729 2) (M79 2)   10  History of Type 2 diabetes mellitus (250 00) (E11 9)    Surgical History    1  History of Cardiac Cath Procedure Outcome: Successful   2  History of Cath Stent Placement   3  History of Coronary Artery Surgery   4  History of Hand Surgery   5  History of Hip Replacement    Family History  Mother    1  Family history of Family Health Status Of Mother -   Father    2  Family history of Chronic Obstructive Pulmonary Disease   3  Family history of Family Health Status Of Father -     Social History    · Active advance directive (V49 89) (Z78 9)   · Denied: History of Alcohol Use (History)   · Never A Smoker   · No advance directive on file (V49 89) (Z78 9)   · Tobacco non-user (V49 89) (Z78 9)    Current Meds   1  AmLODIPine Besylate 10 MG Oral Tablet; TAKE 1 TABLET BY MOUTH IN THE MORNING; Therapy: 88NFL3002 to (Evaluate:96Yee5432)  Requested for: 96PNI2611; Last   Rx:2017 Ordered   2  Aspirin 81 MG Oral Tablet Delayed Release; Therapy: (Recorded:63Joz1057) to Recorded   3  Clopidogrel Bisulfate 75 MG Oral Tablet; take one tablet by mouth one time daily; Therapy: 60JOO2745 to ([facility])  Requested for: 12HPW1314; Last   Rx:2017 Ordered   4  Doxazosin Mesylate 4 MG Oral Tablet; take one tablet by mouth one time daily; Therapy: 97IMC3106 to (Evaluate:2018)  Requested for: 84SIH6029; Last   Rx:23Knv4866 Ordered   5   GlyBURIDE 5 MG Oral Tablet; TAKE 2 TABLETS BY MOUTH IN THE MORNING AND TAKE   1 TABLET IN THE EVENING; Therapy: 73CNX7261 to (ENVPWSTI:20TUQ1121)  Requested for: 22Rrn2704; Last   Rx:62Ubu2357 Ordered   6  Lisinopril 40 MG Oral Tablet; TAKE ONE TABLET BY MOUTH IN THE MORNING AND   ONE-HALF IN THE P M;   Therapy: 13Ehy9284 to (Evaluate:96Xls1215)  Requested for: 14Mbm7860; Last   Rx:95Jjh3560 Ordered   7  Metoprolol Succinate ER 25 MG Oral Tablet Extended Release 24 Hour; take 1 tablet by   mouth twice a day; Therapy: 87RRH3258 to (Celeste Iba)  Requested for: 36Cvl0158; Last   Rx:04Due3611 Ordered   8  OneTouch Ultra Blue In Vitro Strip; USE TO TEST BLOOD SUGAR TWICE DAILY; Therapy: 39CMS2463 to (Evaluate:03Dji0576)  Requested for: 56JGX2700; Last   Rx:11Kwt3785 Ordered   9  OneTouch Ultra Mini KIT; USE BID DAILY ; Therapy: (NRJEDHSS:43RLP5354) to Recorded   10  Oxybutynin Chloride 5 MG Oral Tablet; take one tablet by mouth one time daily; Therapy: 91Avy9321 to (Evaluate:34Djd0921)  Requested for: 02Nlm2807; Last    Rx:38Vfg3112 Ordered   11  TraMADol HCl TABS; Therapy: (Recorded:14Nov2017) to Recorded    Allergies    1  CeleBREX CAPS   2  Dilaudid TABS   3  Statins    Health Management  Health Maintenance   COLONOSCOPY; every 10 years; Next Due: 21QFZ4642; Overdue    Future Appointments    Date/Time Provider Specialty Site   01/11/2018 11:15 AM Alanis De Leon DO Internal Medicine [facility]""   03/14/2018 09:15 AM TARAS Tavera   Dermatology [facility]     Signatures   Electronically signed by : Jossie Price, ; Dec 18 2017  5:40PM EST                       (Author)    Electronically signed by : Óscar Plasencia DO; Dec 18 2017  5:59PM EST                       (Co-author)

## 2018-01-24 ENCOUNTER — TELEPHONE (OUTPATIENT)
Dept: INTERNAL MEDICINE CLINIC | Facility: CLINIC | Age: 83
End: 2018-01-24

## 2018-01-25 ENCOUNTER — TELEPHONE (OUTPATIENT)
Dept: INTERNAL MEDICINE CLINIC | Facility: CLINIC | Age: 83
End: 2018-01-25

## 2018-01-26 ENCOUNTER — HOSPITAL ENCOUNTER (OUTPATIENT)
Facility: HOSPITAL | Age: 83
Setting detail: OBSERVATION
Discharge: HOME | End: 2018-01-27
Attending: EMERGENCY MEDICINE | Admitting: FAMILY MEDICINE
Payer: MEDICARE

## 2018-01-26 ENCOUNTER — APPOINTMENT (EMERGENCY)
Dept: CT IMAGING | Facility: HOSPITAL | Age: 83
End: 2018-01-26
Payer: MEDICARE

## 2018-01-26 DIAGNOSIS — R35.0 URINARY FREQUENCY: ICD-10-CM

## 2018-01-26 DIAGNOSIS — I63.9 CEREBROVASCULAR ACCIDENT (CVA), UNSPECIFIED MECHANISM (HCC): Primary | ICD-10-CM

## 2018-01-26 DIAGNOSIS — R62.7 FAILURE TO THRIVE IN ADULT: ICD-10-CM

## 2018-01-26 DIAGNOSIS — R41.0 CONFUSION: Primary | ICD-10-CM

## 2018-01-26 PROBLEM — Z79.01 WARFARIN ANTICOAGULATION: Status: ACTIVE | Noted: 2018-01-26

## 2018-01-26 PROBLEM — N17.9 AKI (ACUTE KIDNEY INJURY) (HCC): Status: ACTIVE | Noted: 2018-01-26

## 2018-01-26 PROBLEM — T50.901A ACCIDENTAL OVERDOSE: Status: ACTIVE | Noted: 2018-01-26

## 2018-01-26 PROBLEM — Z91.89 AT RISK FOR POLYPHARMACY: Status: ACTIVE | Noted: 2018-01-26

## 2018-01-26 PROBLEM — Z63.8 FAMILY DISCORD: Status: ACTIVE | Noted: 2018-01-26

## 2018-01-26 LAB
ALBUMIN SERPL BCP-MCNC: 3.4 G/DL (ref 3.5–5)
ALP SERPL-CCNC: 111 U/L (ref 46–116)
ALT SERPL W P-5'-P-CCNC: 23 U/L (ref 12–78)
ANION GAP SERPL CALCULATED.3IONS-SCNC: 6 MMOL/L (ref 4–13)
APTT PPP: 43 SECONDS (ref 23–35)
AST SERPL W P-5'-P-CCNC: 21 U/L (ref 5–45)
BASOPHILS # BLD AUTO: 0.06 THOUSANDS/ΜL (ref 0–0.1)
BASOPHILS NFR BLD AUTO: 1 % (ref 0–1)
BILIRUB SERPL-MCNC: 0.3 MG/DL (ref 0.2–1)
BUN SERPL-MCNC: 32 MG/DL (ref 5–25)
CALCIUM SERPL-MCNC: 9.5 MG/DL (ref 8.3–10.1)
CHLORIDE SERPL-SCNC: 101 MMOL/L (ref 100–108)
CLARITY, POC: CLEAR
CO2 SERPL-SCNC: 30 MMOL/L (ref 21–32)
COLOR, POC: YELLOW
CREAT SERPL-MCNC: 1.62 MG/DL (ref 0.6–1.3)
EOSINOPHIL # BLD AUTO: 0.33 THOUSAND/ΜL (ref 0–0.61)
EOSINOPHIL NFR BLD AUTO: 5 % (ref 0–6)
ERYTHROCYTE [DISTWIDTH] IN BLOOD BY AUTOMATED COUNT: 14.1 % (ref 11.6–15.1)
EXT BILIRUBIN, UA: NEGATIVE
EXT BLOOD URINE: ABNORMAL
EXT GLUCOSE, UA: NEGATIVE
EXT KETONES: NEGATIVE
EXT NITRITE, UA: NEGATIVE
EXT PH, UA: 6
EXT PROTEIN, UA: ABNORMAL
EXT SPECIFIC GRAVITY, UA: 1.01
EXT UROBILINOGEN: 0.2
GFR SERPL CREATININE-BSD FRML MDRD: 39 ML/MIN/1.73SQ M
GLUCOSE SERPL-MCNC: 137 MG/DL (ref 65–140)
HCT VFR BLD AUTO: 35.6 % (ref 36.5–49.3)
HGB BLD-MCNC: 11.5 G/DL (ref 12–17)
INR PPP: 1.86 (ref 0.86–1.16)
LYMPHOCYTES # BLD AUTO: 1.74 THOUSANDS/ΜL (ref 0.6–4.47)
LYMPHOCYTES NFR BLD AUTO: 28 % (ref 14–44)
MAGNESIUM SERPL-MCNC: 2 MG/DL (ref 1.6–2.6)
MCH RBC QN AUTO: 30.3 PG (ref 26.8–34.3)
MCHC RBC AUTO-ENTMCNC: 32.3 G/DL (ref 31.4–37.4)
MCV RBC AUTO: 94 FL (ref 82–98)
MONOCYTES # BLD AUTO: 0.6 THOUSAND/ΜL (ref 0.17–1.22)
MONOCYTES NFR BLD AUTO: 10 % (ref 4–12)
NEUTROPHILS # BLD AUTO: 3.42 THOUSANDS/ΜL (ref 1.85–7.62)
NEUTS SEG NFR BLD AUTO: 56 % (ref 43–75)
NRBC BLD AUTO-RTO: 0 /100 WBCS
PLATELET # BLD AUTO: 212 THOUSANDS/UL (ref 149–390)
PMV BLD AUTO: 9.4 FL (ref 8.9–12.7)
POTASSIUM SERPL-SCNC: 4.6 MMOL/L (ref 3.5–5.3)
PROT SERPL-MCNC: 8.1 G/DL (ref 6.4–8.2)
PROTHROMBIN TIME: 21.9 SECONDS (ref 12.1–14.4)
RBC # BLD AUTO: 3.79 MILLION/UL (ref 3.88–5.62)
SODIUM SERPL-SCNC: 137 MMOL/L (ref 136–145)
TSH SERPL DL<=0.05 MIU/L-ACNC: 2.45 UIU/ML (ref 0.36–3.74)
WBC # BLD AUTO: 6.17 THOUSAND/UL (ref 4.31–10.16)
WBC # BLD EST: NEGATIVE 10*3/UL

## 2018-01-26 PROCEDURE — 93005 ELECTROCARDIOGRAM TRACING: CPT

## 2018-01-26 PROCEDURE — 36415 COLL VENOUS BLD VENIPUNCTURE: CPT | Performed by: EMERGENCY MEDICINE

## 2018-01-26 PROCEDURE — 85610 PROTHROMBIN TIME: CPT | Performed by: EMERGENCY MEDICINE

## 2018-01-26 PROCEDURE — 84443 ASSAY THYROID STIM HORMONE: CPT | Performed by: EMERGENCY MEDICINE

## 2018-01-26 PROCEDURE — 85025 COMPLETE CBC W/AUTO DIFF WBC: CPT | Performed by: EMERGENCY MEDICINE

## 2018-01-26 PROCEDURE — 80053 COMPREHEN METABOLIC PANEL: CPT | Performed by: EMERGENCY MEDICINE

## 2018-01-26 PROCEDURE — 99220 PR INITIAL OBSERVATION CARE/DAY 70 MINUTES: CPT | Performed by: NURSE PRACTITIONER

## 2018-01-26 PROCEDURE — 81002 URINALYSIS NONAUTO W/O SCOPE: CPT | Performed by: EMERGENCY MEDICINE

## 2018-01-26 PROCEDURE — 83735 ASSAY OF MAGNESIUM: CPT | Performed by: EMERGENCY MEDICINE

## 2018-01-26 PROCEDURE — 70450 CT HEAD/BRAIN W/O DYE: CPT

## 2018-01-26 PROCEDURE — 85730 THROMBOPLASTIN TIME PARTIAL: CPT | Performed by: EMERGENCY MEDICINE

## 2018-01-26 RX ORDER — TRAMADOL HYDROCHLORIDE 50 MG/1
1 TABLET ORAL EVERY 6 HOURS PRN
COMMUNITY
Start: 2018-01-08 | End: 2018-01-30 | Stop reason: SDUPTHER

## 2018-01-26 RX ORDER — LISINOPRIL 20 MG/1
40 TABLET ORAL DAILY
Status: DISCONTINUED | OUTPATIENT
Start: 2018-01-27 | End: 2018-01-26

## 2018-01-26 RX ORDER — ERGOCALCIFEROL (VITAMIN D2) 1250 MCG
50000 CAPSULE ORAL WEEKLY
COMMUNITY
End: 2018-02-09 | Stop reason: SDUPTHER

## 2018-01-26 RX ORDER — CLOPIDOGREL BISULFATE 75 MG/1
1 TABLET ORAL DAILY
COMMUNITY
Start: 2017-01-09 | End: 2018-01-26

## 2018-01-26 RX ORDER — WARFARIN SODIUM 4 MG/1
4 TABLET ORAL
Status: DISCONTINUED | OUTPATIENT
Start: 2018-01-27 | End: 2018-01-27 | Stop reason: HOSPADM

## 2018-01-26 RX ORDER — GABAPENTIN 100 MG/1
1 CAPSULE ORAL 2 TIMES DAILY
COMMUNITY
Start: 2018-01-05 | End: 2018-01-30 | Stop reason: SDUPTHER

## 2018-01-26 RX ORDER — CHOLECALCIFEROL (VITAMIN D3) 125 MCG
500 CAPSULE ORAL DAILY
Status: DISCONTINUED | OUTPATIENT
Start: 2018-01-27 | End: 2018-01-27 | Stop reason: HOSPADM

## 2018-01-26 RX ORDER — FOLIC ACID 1 MG/1
2.5 TABLET ORAL DAILY
COMMUNITY
Start: 2018-01-05 | End: 2018-02-01 | Stop reason: SDUPTHER

## 2018-01-26 RX ORDER — ASPIRIN 81 MG/1
324 TABLET, CHEWABLE ORAL DAILY
Status: DISCONTINUED | OUTPATIENT
Start: 2018-01-27 | End: 2018-01-27 | Stop reason: HOSPADM

## 2018-01-26 RX ORDER — GABAPENTIN 100 MG/1
100 CAPSULE ORAL 2 TIMES DAILY
Status: DISCONTINUED | OUTPATIENT
Start: 2018-01-27 | End: 2018-01-27 | Stop reason: HOSPADM

## 2018-01-26 RX ORDER — OXYBUTYNIN CHLORIDE 5 MG/1
1 TABLET ORAL DAILY
COMMUNITY
Start: 2016-07-27 | End: 2018-03-02 | Stop reason: SDUPTHER

## 2018-01-26 RX ORDER — BLOOD-GLUCOSE METER
KIT MISCELLANEOUS
COMMUNITY
End: 2021-06-08 | Stop reason: SDUPTHER

## 2018-01-26 RX ORDER — ERGOCALCIFEROL 1.25 MG/1
1 CAPSULE ORAL WEEKLY
COMMUNITY
Start: 2017-01-19 | End: 2018-01-26

## 2018-01-26 RX ORDER — ACETAMINOPHEN 325 MG/1
650 TABLET ORAL EVERY 6 HOURS PRN
Status: DISCONTINUED | OUTPATIENT
Start: 2018-01-26 | End: 2018-01-27 | Stop reason: HOSPADM

## 2018-01-26 RX ORDER — OXYBUTYNIN CHLORIDE 5 MG/1
5 TABLET ORAL DAILY
Status: DISCONTINUED | OUTPATIENT
Start: 2018-01-27 | End: 2018-01-27 | Stop reason: HOSPADM

## 2018-01-26 RX ORDER — PYRIDOXINE HCL (VITAMIN B6) 100 MG
1 TABLET ORAL DAILY
COMMUNITY
Start: 2018-01-08 | End: 2021-07-20 | Stop reason: HOSPADM

## 2018-01-26 RX ORDER — FOLIC ACID 1 MG/1
2.5 TABLET ORAL DAILY
Status: DISCONTINUED | OUTPATIENT
Start: 2018-01-27 | End: 2018-01-27 | Stop reason: HOSPADM

## 2018-01-26 RX ORDER — PYRIDOXINE HCL (VITAMIN B6) 50 MG
100 TABLET ORAL DAILY
Status: DISCONTINUED | OUTPATIENT
Start: 2018-01-27 | End: 2018-01-27 | Stop reason: HOSPADM

## 2018-01-26 RX ORDER — METOPROLOL SUCCINATE 25 MG/1
1 TABLET, EXTENDED RELEASE ORAL 2 TIMES DAILY
COMMUNITY
Start: 2015-06-01 | End: 2018-01-26

## 2018-01-26 RX ORDER — AMLODIPINE BESYLATE 10 MG/1
10 TABLET ORAL DAILY
Status: DISCONTINUED | OUTPATIENT
Start: 2018-01-27 | End: 2018-01-27 | Stop reason: HOSPADM

## 2018-01-26 RX ORDER — DOXAZOSIN MESYLATE 4 MG/1
4 TABLET ORAL DAILY
Status: DISCONTINUED | OUTPATIENT
Start: 2018-01-27 | End: 2018-01-27 | Stop reason: HOSPADM

## 2018-01-26 NOTE — ED PROVIDER NOTES
History  Chief Complaint   Patient presents with    Eye Problem     PT'S SON STATES THAT THE PATIENT'S RIGHT EYE LOOKS LIKE IT IS BULGING     Overdose - Accidental     PT'S FAMILY WENT TO CHECK ON PATIENT AND THERE WERE MEDS MISSING FROM HIS PILL TRAY -  MISSING FRIDAY PM DOSES AND SATURDAY AM - PT DENIES TAKING THEM BUT IS UNSURE WHAT HAPPENED TO THEM       History provided by:  Patient and relative   used: No     53-year-old male with a history of CVA, diabetes, hyperlipidemia brought for evaluation of change in mental status as well as possible overdose of his medications  Family notes that he lives with a friend  Medications are set out pinna weekly pillbox  At his the meds for this evening as well as tomorrow morning or gone  Suspect he may have taken extra including metoprolol, multiple vitamins  He is on warfarin but family does not think that he took extra those because they were in a different location  The patient states he has he feels has been taking meds appropriately  He is oriented to person, place time  Has just been more forgetful lately  Family feels he is unable to care for himself  He has not been eating well  Stated they bring him food but he often does not eat it  Plan CT head, labs, EKG and re-evaluate  Prior to Admission Medications   Prescriptions Last Dose Informant Patient Reported? Taking?    Blood Glucose Monitoring Suppl (ONE TOUCH ULTRA MINI) w/Device KIT   Yes Yes   Sig: by Does not apply route   Cyanocobalamin ER (RA VITAMIN B-12) 1000 MCG TBCR   Yes Yes   Sig: Take 2 tablets by mouth daily   amLODIPine (NORVASC) 10 mg tablet   No Yes   Sig: Take 1 tablet by mouth daily   aspirin 81 mg chewable tablet   No Yes   Sig: Chew 4 tablets daily   doxazosin (CARDURA) 4 mg tablet   No Yes   Sig: Take 1 tablet by mouth daily   ergocalciferol (ERGOCALCIFEROL) 20799 units capsule   Yes Yes   Sig: Take 50,000 Units by mouth once a week   ergocalciferol (VITAMIN D2) 50,000 units   Yes Yes   Sig: Take 1 capsule by mouth once a week   folic acid (FOLVITE) 1 mg tablet   Yes Yes   Sig: Take 2 5 mg by mouth daily     gabapentin (NEURONTIN) 100 mg capsule   Yes Yes   Sig: Take 1 capsule by mouth 2 (two) times a day   glucose blood (ONE TOUCH ULTRA TEST) test strip   Yes Yes   Sig: by In Vitro route 2 (two) times a day   glyBURIDE (DIABETA) 2 5 mg tablet   No Yes   Sig: Take 1 tablet by mouth daily with breakfast   lisinopril (ZESTRIL) 40 mg tablet   No Yes   Sig: Take 1 tablet by mouth daily   oxybutynin (DITROPAN) 5 mg tablet   Yes Yes   Sig: Take 1 tablet by mouth daily   pyridoxine (RA VITAMIN B-6) 100 MG tablet   Yes Yes   Sig: Take 1 tablet by mouth daily   traMADol (ULTRAM) 50 mg tablet   Yes Yes   Sig: Take 1 tablet by mouth every 6 (six) hours as needed   warfarin (COUMADIN) 5 mg tablet   No Yes   Si mg PO QPM starting 17, monitor INR   Patient taking differently: 4 mg 5 mg PO QPM starting 17, monitor INR       Facility-Administered Medications: None       Past Medical History:   Diagnosis Date    CAD (coronary artery disease)     DM2 (diabetes mellitus, type 2) (HCC)     History of transfusion     HLD (hyperlipidemia)     HTN (hypertension)        Past Surgical History:   Procedure Laterality Date    HIP HARDWARE REMOVAL      TOTAL HIP ARTHROPLASTY Right        Family History   Problem Relation Age of Onset    Family history unknown: Yes     I have reviewed and agree with the history as documented  Social History   Substance Use Topics    Smoking status: Never Smoker    Smokeless tobacco: Never Used    Alcohol use No        Review of Systems   Constitutional: Positive for activity change and appetite change  Respiratory: Negative for chest tightness and shortness of breath  Musculoskeletal: Negative for back pain and neck pain  Skin: Negative for color change and pallor     Neurological: Negative for dizziness, weakness and headaches  Psychiatric/Behavioral: Positive for confusion  All other systems reviewed and are negative  Physical Exam  ED Triage Vitals [01/26/18 1708]   Temperature Pulse Respirations Blood Pressure SpO2   97 8 °F (36 6 °C) 61 18 165/77 95 %      Temp Source Heart Rate Source Patient Position - Orthostatic VS BP Location FiO2 (%)   Temporal Monitor Sitting Left arm --      Pain Score       8           Orthostatic Vital Signs  Vitals:    01/26/18 1708 01/26/18 2145   BP: 165/77 (!) 189/90   Pulse: 61 61   Patient Position - Orthostatic VS: Sitting        Physical Exam   Constitutional: He is oriented to person, place, and time  He appears well-developed and well-nourished  No distress  HENT:   Head: Normocephalic  Mouth/Throat: Oropharynx is clear and moist    Cardiovascular: Normal rate and regular rhythm  Pulmonary/Chest: Effort normal and breath sounds normal    Musculoskeletal: Normal range of motion  He exhibits no edema  Neurological: He is alert and oriented to person, place, and time  Skin: Skin is warm and dry  Psychiatric: He has a normal mood and affect  His behavior is normal    Nursing note and vitals reviewed        ED Medications  Medications - No data to display    Diagnostic Studies  Results Reviewed     Procedure Component Value Units Date/Time    POCT urinalysis dipstick [[id number]]  (Abnormal) Resulted:  01/26/18 2145    Lab Status:  Final result Specimen:  Urine Updated:  01/26/18 2146     Color, UA Yellow     Clarity, UA Clear     EXT Glucose, UA Negative     EXT Bilirubin, UA (Ref: Negative) Negative     EXT Ketones, UA (Ref: Negative) Negative     EXT Spec Grav, UA 1 010     EXT Blood, UA (Ref: Negative) Hemolyzed Trace     EXT pH, UA 6 0     EXT Protein, UA (Ref: Negative) 30+     EXT Urobilinogen, UA (Ref: 0 2- 1 0) 0 2     EXT Leukocytes, UA (Ref: Negative) Negative     EXT Nitrite, UA (Ref: Negative) Negative    APTT [[id number]]  (Abnormal) Collected:  01/26/18 1925 Lab Status:  Final result Specimen:  Blood from Arm, Right Updated:  01/26/18 2026     PTT 43 (H) seconds     Narrative: Therapeutic Heparin Range = 60-90 seconds    Protime-INR [[id number]]  (Abnormal) Collected:  01/26/18 1925    Lab Status:  Final result Specimen:  Blood from Arm, Right Updated:  01/26/18 2026     Protime 21 9 (H) seconds      INR 1 86 (H)    TSH [[id number]]  (Normal) Collected:  01/26/18 1925    Lab Status:  Final result Specimen:  Blood from Arm, Right Updated:  01/26/18 2018     TSH 3RD GENERATON 2 453 uIU/mL     Narrative:         Patients undergoing fluorescein dye angiography may retain small amounts of fluorescein in the body for 48-72 hours post procedure  Samples containing fluorescein can produce falsely depressed TSH values  If the patient had this procedure,a specimen should be resubmitted post fluorescein clearance  Magnesium [[id number]]  (Normal) Collected:  01/26/18 1925    Lab Status:  Final result Specimen:  Blood from Arm, Right Updated:  01/26/18 2018     Magnesium 2 0 mg/dL     Comprehensive metabolic panel [[id number]]  (Abnormal) Collected:  01/26/18 1925    Lab Status:  Final result Specimen:  Blood from Arm, Right Updated:  01/26/18 2010     Sodium 137 mmol/L      Potassium 4 6 mmol/L      Chloride 101 mmol/L      CO2 30 mmol/L      Anion Gap 6 mmol/L      BUN 32 (H) mg/dL      Creatinine 1 62 (H) mg/dL      Glucose 137 mg/dL      Calcium 9 5 mg/dL      AST 21 U/L      ALT 23 U/L      Alkaline Phosphatase 111 U/L      Total Protein 8 1 g/dL      Albumin 3 4 (L) g/dL      Total Bilirubin 0 30 mg/dL      eGFR 39 ml/min/1 73sq m     Narrative:         National Kidney Disease Education Program recommendations are as follows:  GFR calculation is accurate only with a steady state creatinine  Chronic Kidney disease less than 60 ml/min/1 73 sq  meters  Kidney failure less than 15 ml/min/1 73 sq  meters      CBC and differential [[id number]]  (Abnormal) Collected:  01/26/18 1925 Lab Status:  Final result Specimen:  Blood from Arm, Right Updated:  01/26/18 1954     WBC 6 17 Thousand/uL      RBC 3 79 (L) Million/uL      Hemoglobin 11 5 (L) g/dL      Hematocrit 35 6 (L) %      MCV 94 fL      MCH 30 3 pg      MCHC 32 3 g/dL      RDW 14 1 %      MPV 9 4 fL      Platelets 539 Thousands/uL      nRBC 0 /100 WBCs      Neutrophils Relative 56 %      Lymphocytes Relative 28 %      Monocytes Relative 10 %      Eosinophils Relative 5 %      Basophils Relative 1 %      Neutrophils Absolute 3 42 Thousands/µL      Lymphocytes Absolute 1 74 Thousands/µL      Monocytes Absolute 0 60 Thousand/µL      Eosinophils Absolute 0 33 Thousand/µL      Basophils Absolute 0 06 Thousands/µL                  CT head without contrast   Final Result by Norberto Garcia MD (01/26 2017)      No acute intracranial abnormality  Moderate chronic microvascular ischemia  Workstation performed: [id number]                    Procedures  ECG 12 Lead Documentation  Date/Time: 1/26/2018 9:02 PM  Performed by: Edenilson Chong  Authorized by: Edenilson Chong     Indications / Diagnosis:  Altered  ECG reviewed by me, the ED Provider: yes    Patient location:  ED  Previous ECG:     Previous ECG:  Compared to current    Comparison ECG info:  11/26/17    Similarity:  No change  Rate:     ECG rate:  58  Rhythm:     Rhythm: sinus bradycardia    Ectopy:     Ectopy: none    QRS:     QRS axis:  Normal  Conduction:     Conduction: abnormal      Abnormal conduction: 1st degree    ST segments:     ST segments:  Normal  T waves:     T waves: normal    Q waves:     Q waves:  II and III           Phone Contacts  ED Phone Contact    ED Course  ED Course                                MDM  Number of Diagnoses or Management Options  Confusion:   Failure to thrive in adult:   Diagnosis management comments: 21-year-old male with history of CVA, hypertension brought in by family for forgetfulness, confusion, failure to thrive    Not eating well, mixing medications  Uncomfortable taking him home where he lives with a friend  Plan admission for placement  Amount and/or Complexity of Data Reviewed  Clinical lab tests: ordered and reviewed  Tests in the radiology section of CPT®: ordered and reviewed  Obtain history from someone other than the patient: yes  Discuss the patient with other providers: yes  Independent visualization of images, tracings, or specimens: yes    Patient Progress  Patient progress: stable    CritCare Time    Disposition  Final diagnoses:   Confusion   Failure to thrive in adult   Urinary frequency     Time reflects when diagnosis was documented in both MDM as applicable and the Disposition within this note     Time User Action Codes Description Comment    1/26/2018  9:04 PM Neli Latch Add [R41 0] Confusion     1/26/2018  9:10 PM Anyi [address]an [address]  [R62 7] Failure to thrive in adult     1/26/2018 10:13 PM Neli Latch Add [R35 0] Urinary frequency       ED Disposition     ED Disposition Condition Comment    Admit  Case was discussed with PA and the patient's admission status was agreed to be Admission Status: observation status to the service of Dr Sears Barrier   Follow-up Information    None       Patient's Medications   Discharge Prescriptions    No medications on file     No discharge procedures on file      ED Provider  Electronically Signed by           Coretta Monreal MD  01/26/18 4980

## 2018-01-26 NOTE — TELEPHONE ENCOUNTER
I called the patient back  I spoke to his son  He is to continue his current dose of Coumadin which is 4 milligrams per day  Please change the son's phone number for contact as follows  His name is Kristofer Bee    His phone number is [phone]

## 2018-01-27 ENCOUNTER — ANTICOAG VISIT (OUTPATIENT)
Dept: INTERNAL MEDICINE CLINIC | Facility: CLINIC | Age: 83
End: 2018-01-27

## 2018-01-27 VITALS
DIASTOLIC BLOOD PRESSURE: 70 MMHG | HEIGHT: 70 IN | WEIGHT: 224 LBS | HEART RATE: 69 BPM | SYSTOLIC BLOOD PRESSURE: 152 MMHG | TEMPERATURE: 98.2 F | BODY MASS INDEX: 32.07 KG/M2 | RESPIRATION RATE: 16 BRPM | OXYGEN SATURATION: 98 %

## 2018-01-27 DIAGNOSIS — I82.90 DEEP VEIN THROMBOSIS (DVT) OF NON-EXTREMITY VEIN, UNSPECIFIED CHRONICITY: Primary | ICD-10-CM

## 2018-01-27 PROBLEM — T50.901A ACCIDENTAL OVERDOSE: Status: RESOLVED | Noted: 2018-01-26 | Resolved: 2018-01-27

## 2018-01-27 PROBLEM — N17.9 AKI (ACUTE KIDNEY INJURY) (HCC): Status: RESOLVED | Noted: 2018-01-26 | Resolved: 2018-01-27

## 2018-01-27 LAB
ANION GAP SERPL CALCULATED.3IONS-SCNC: 6 MMOL/L (ref 4–13)
BUN SERPL-MCNC: 25 MG/DL (ref 5–25)
CALCIUM SERPL-MCNC: 9.6 MG/DL (ref 8.3–10.1)
CHLORIDE SERPL-SCNC: 103 MMOL/L (ref 100–108)
CO2 SERPL-SCNC: 30 MMOL/L (ref 21–32)
CREAT SERPL-MCNC: 1.5 MG/DL (ref 0.6–1.3)
ERYTHROCYTE [DISTWIDTH] IN BLOOD BY AUTOMATED COUNT: 13.9 % (ref 11.6–15.1)
GFR SERPL CREATININE-BSD FRML MDRD: 43 ML/MIN/1.73SQ M
GLUCOSE P FAST SERPL-MCNC: 131 MG/DL (ref 65–99)
GLUCOSE SERPL-MCNC: 131 MG/DL (ref 65–140)
HCT VFR BLD AUTO: 37.7 % (ref 36.5–49.3)
HGB BLD-MCNC: 12.2 G/DL (ref 12–17)
MAGNESIUM SERPL-MCNC: 1.9 MG/DL (ref 1.6–2.6)
MCH RBC QN AUTO: 30.2 PG (ref 26.8–34.3)
MCHC RBC AUTO-ENTMCNC: 32.4 G/DL (ref 31.4–37.4)
MCV RBC AUTO: 93 FL (ref 82–98)
PLATELET # BLD AUTO: 222 THOUSANDS/UL (ref 149–390)
PMV BLD AUTO: 9.1 FL (ref 8.9–12.7)
POTASSIUM SERPL-SCNC: 4.3 MMOL/L (ref 3.5–5.3)
RBC # BLD AUTO: 4.04 MILLION/UL (ref 3.88–5.62)
SODIUM SERPL-SCNC: 139 MMOL/L (ref 136–145)
TSH SERPL DL<=0.05 MIU/L-ACNC: 2.25 UIU/ML (ref 0.36–3.74)
WBC # BLD AUTO: 6.62 THOUSAND/UL (ref 4.31–10.16)

## 2018-01-27 PROCEDURE — 97163 PT EVAL HIGH COMPLEX 45 MIN: CPT

## 2018-01-27 PROCEDURE — 99222 1ST HOSP IP/OBS MODERATE 55: CPT | Performed by: PSYCHIATRY & NEUROLOGY

## 2018-01-27 PROCEDURE — 83735 ASSAY OF MAGNESIUM: CPT | Performed by: NURSE PRACTITIONER

## 2018-01-27 PROCEDURE — 36415 COLL VENOUS BLD VENIPUNCTURE: CPT | Performed by: NURSE PRACTITIONER

## 2018-01-27 PROCEDURE — 99285 EMERGENCY DEPT VISIT HI MDM: CPT

## 2018-01-27 PROCEDURE — G8978 MOBILITY CURRENT STATUS: HCPCS

## 2018-01-27 PROCEDURE — 85027 COMPLETE CBC AUTOMATED: CPT | Performed by: NURSE PRACTITIONER

## 2018-01-27 PROCEDURE — 84443 ASSAY THYROID STIM HORMONE: CPT | Performed by: NURSE PRACTITIONER

## 2018-01-27 PROCEDURE — 99217 PR OBSERVATION CARE DISCHARGE MANAGEMENT: CPT | Performed by: GENERAL PRACTICE

## 2018-01-27 PROCEDURE — 80048 BASIC METABOLIC PNL TOTAL CA: CPT | Performed by: NURSE PRACTITIONER

## 2018-01-27 PROCEDURE — G8979 MOBILITY GOAL STATUS: HCPCS

## 2018-01-27 RX ORDER — HYDRALAZINE HYDROCHLORIDE 20 MG/ML
10 INJECTION INTRAMUSCULAR; INTRAVENOUS EVERY 4 HOURS PRN
Status: DISCONTINUED | OUTPATIENT
Start: 2018-01-27 | End: 2018-01-27 | Stop reason: HOSPADM

## 2018-01-27 RX ADMIN — ACETAMINOPHEN 650 MG: 325 TABLET ORAL at 00:47

## 2018-01-27 RX ADMIN — FOLIC ACID 2.5 MG: 1 TABLET ORAL at 08:18

## 2018-01-27 RX ADMIN — OXYBUTYNIN CHLORIDE 5 MG: 5 TABLET ORAL at 09:18

## 2018-01-27 RX ADMIN — DOXAZOSIN 4 MG: 4 TABLET ORAL at 09:18

## 2018-01-27 RX ADMIN — CYANOCOBALAMIN TAB 500 MCG 500 MCG: 500 TAB at 09:18

## 2018-01-27 RX ADMIN — Medication 100 MG: at 09:18

## 2018-01-27 RX ADMIN — AMLODIPINE BESYLATE 10 MG: 10 TABLET ORAL at 08:17

## 2018-01-27 RX ADMIN — GABAPENTIN 100 MG: 100 CAPSULE ORAL at 08:18

## 2018-01-27 RX ADMIN — HYDRALAZINE HYDROCHLORIDE 10 MG: 20 INJECTION INTRAMUSCULAR; INTRAVENOUS at 08:24

## 2018-01-27 RX ADMIN — ASPIRIN 81 MG 324 MG: 81 TABLET ORAL at 08:17

## 2018-01-27 NOTE — ASSESSMENT & PLAN NOTE
· Subtherapeutic  · Per family at bedside patient takes Coumadin  · Continue 4 mg, monitor PT INR in the a m  Ariel Manifold

## 2018-01-27 NOTE — H&P
H&P- Kacey Kearney 1935, [de-identified] y o  male MRN: [id number]    Unit/Bed#: ED 20 Encounter: [id number]    Primary Care Provider: Betsey Cordon DO   Date and time admitted to hospital: 1/26/2018  6:28 PM    Family discord   Assessment & Plan    · Patient's son and daughter at the bedside  They are concerned of his living arrangement with his girlfriend at home  They are also concerned that patient possibly ingested too much medication  They checked the pillbox and the medications for Friday and Saturday were missing  Family also verbalize would prefer for patient to go to an assisted living center rather than going back home  They report he has been exhibiting increase periods of forgetfulness  Patient is very adamant, refusing to go to assisted living center, nursing home  Patient is insisted to go going home at this time  Patient reports he has a girlfriend at home that is able to assist them  · Family at bedside patient's son and daughter reports inability to care for him  · I will consult case management to assist with safe discharge coordination  · Family requesting for Neurology to evaluate patient for possible dementia, I will consult  Although I find patient to be awake alert oriented x3 he is verbal, answers questions appropriately, he understands and responds to his medical conditions  I spent a total of about an hour discussing plan of care with patient, daughter and son at bedside  Advise in the a m  we will not be able to hold patient against his will if he decided to go home  · CT scan reviewed no acute findings  JEANNINE (acute kidney injury) ([facility] [address] )   Assessment & Plan    · Chart review creatinine gradually elevating within the past few months  · Will initiate gentle hydration  If not improved will consider Nephrology consult  · Will hold nephrotoxin agent  · Will monitor kidney function  In the a m  Warfarin anticoagulation   Assessment & Plan    · Subtherapeutic    · Per family at bedside patient takes Coumadin  · Continue 4 mg, monitor PT INR in the a m           Accidental overdose   Assessment & Plan    · At risk for polypharmacy:  Per family, Possible increased forgetfulness  Family at bedside report when they checked patient's pill box the doses for Friday and Saturday were missing  Was asked from PCP to come to ED for evaluation of overdose  · No acute signs of overdose noted  Will monitor patient  · UA reviewed      DM2 (diabetes mellitus, type 2) (HCC)   Assessment & Plan    · In obesity  · Recent A1c 7 2  Will reassess  · Will hold glyburide and initiate SSI  · Lifestyle modification, dietary measures encourage  · Low carb diet        HTN (hypertension)   Assessment & Plan    · Blood pressure control will continue to monitor  · Continue home regiment of Cardura and Norvasc,   · Will hold lisinopril secondary to mild JEANNINE  VTE Prophylaxis: Warfarin (Coumadin)  / sequential compression device   Code Status:  Full code  POLST: There is no POLST form on file for this patient (pre-hospital)  Discussion with family:  Daughter and son at bedside    Anticipated Length of Stay:  Patient will be admitted on an Observation basis with an anticipated length of stay of greater than 2 midnights  Justification for Hospital Stay:  JEANNINE    Total Time for Visit, including Counseling / Coordination of Care: 90 minutes  Greater than 50% of this total time spent on direct patient counseling and coordination of care  Chief Complaint:   Accidental overdose    History of Present Illness:    Eliane Olivera is a [de-identified] y o  male history of hypertension , hyperlipidemia, previous strokes, CAD and diabetes mellitus type 2   who presents with chief complaint of family at bedside stating patient needs to be evaluated for accidental overdose  Patient's son report noted missing tablets from his pill box from Friday and Saturday    Patient's son and daughter at bedside report patient has had increased forgetfulness and is concerned that he may not be able to take care of himself at home  Patient himself reports he is fine, he is not concerned he has assistance his girlfriend at home is able to help him  There is some underlying family discord at this time will consult case management for further assistance  Otherwise patient denies fever, chills, dizziness, lightheadedness, shortness of breath, cough, chest pain or any other discomfort  He does have a history of urinary frequency and this was addressed with his urologist     Review of Systems:    Review of Systems   Constitutional: Negative for chills, fatigue and fever  Respiratory: Negative for chest tightness and shortness of breath  Cardiovascular: Negative for chest pain and palpitations  Genitourinary: Positive for frequency  Musculoskeletal: Positive for arthralgias (Worsen on the right)  All other systems reviewed and are negative  Past Medical and Surgical History:     Past Medical History:   Diagnosis Date    CAD (coronary artery disease)     DM2 (diabetes mellitus, type 2) ([facility] [address] )     History of transfusion     HLD (hyperlipidemia)     HTN (hypertension)        Past Surgical History:   Procedure Laterality Date    HIP HARDWARE REMOVAL      TOTAL HIP ARTHROPLASTY Right        Meds/Allergies:    Prior to Admission medications    Medication Sig Start Date End Date Taking?  Authorizing Provider   amLODIPine (NORVASC) 10 mg tablet Take 1 tablet by mouth daily 12/19/17  Yes Adilene Lucio MD   aspirin 81 mg chewable tablet Chew 4 tablets daily 12/19/17  Yes Adilene Lucio MD   Blood Glucose Monitoring Suppl (ONE TOUCH ULTRA MINI) w/Device KIT by Does not apply route   Yes Historical Provider, MD   Cyanocobalamin ER (RA VITAMIN B-12) 1000 MCG TBCR Take 2 tablets by mouth daily 1/8/18  Yes Historical Provider, MD   doxazosin (CARDURA) 4 mg tablet Take 1 tablet by mouth daily 12/19/17  Yes Adilene Lucio MD   ergocalciferol (ERGOCALCIFEROL) 97629 units capsule Take 50,000 Units by mouth once a week   Yes Historical Provider, MD   folic acid (FOLVITE) 1 mg tablet Take 2 5 mg by mouth daily   1/5/18  Yes Historical Provider, MD   gabapentin (NEURONTIN) 100 mg capsule Take 1 capsule by mouth 2 (two) times a day 1/5/18  Yes Historical Provider, MD   glucose blood (ONE TOUCH ULTRA TEST) test strip by In Vitro route 2 (two) times a day 1/31/14  Yes Historical Provider, MD   glyBURIDE (DIABETA) 2 5 mg tablet Take 1 tablet by mouth daily with breakfast 12/19/17  Yes Eliud Abrams, MD   lisinopril (ZESTRIL) 40 mg tablet Take 1 tablet by mouth daily 12/19/17  Yes Eliud Abrams, MD   oxybutynin (DITROPAN) 5 mg tablet Take 1 tablet by mouth daily 7/27/16  Yes Historical Provider, MD   pyridoxine (RA VITAMIN B-6) 100 MG tablet Take 1 tablet by mouth daily 1/8/18  Yes Historical Provider, MD   traMADol (ULTRAM) 50 mg tablet Take 1 tablet by mouth every 6 (six) hours as needed 1/8/18  Yes Historical Provider, MD   warfarin (COUMADIN) 5 mg tablet 5 mg PO QPM starting 12/18/17, monitor INR  Patient taking differently: 4 mg 5 mg PO QPM starting 12/18/17, monitor INR  12/18/17  Yes Eliud Abrams MD   ergocalciferol (VITAMIN D2) 50,000 units Take 1 capsule by mouth once a week 1/19/17 1/26/18 Yes Historical Provider, MD   aspirin 81 MG tablet Take by mouth  1/26/18  Historical Provider, MD   clopidogrel (PLAVIX) 75 mg tablet Take 1 tablet by mouth daily 1/9/17 1/26/18  Historical Provider, MD   ergocalciferol (VITAMIN D2) 50,000 units Take 1 capsule by mouth once a week for 4 doses 12/20/17 1/26/18  Eliud Abrams MD   folic acid-pyridoxine-cyanocobalamin 2 5-25-2 mg Take 1 tablet by mouth daily 12/19/17 1/26/18  Eliud Abrams MD   metoprolol succinate (TOPROL-XL) 25 mg 24 hr tablet Take 1 tablet by mouth 2 (two) times a day 6/1/15 1/26/18  Historical Provider, MD   metoprolol tartrate (LOPRESSOR) 100 mg tablet Take 1 tablet by mouth every 12 (twelve) hours 12/18/17 1/26/18  Brittanie Simon MD     I have reviewed home medications with patient family member  Allergies: Allergies   Allergen Reactions    Celecoxib     Hydromorphone     Pravastatin Hives    Statins        Social History:     Marital Status:    Occupation:  Retired  Patient Pre-hospital Living Situation:  Home  Patient Pre-hospital Level of Mobility:  With walker  Patient Pre-hospital Diet Restrictions:  Diabetic  Substance Use History:   History   Alcohol Use No     History   Smoking Status    Never Smoker   Smokeless Tobacco    Never Used     History   Drug Use No       Family History:    non-contributory    Physical Exam:     Vitals:   Blood Pressure: (!) 189/90 (01/26/18 2145)  Pulse: 61 (01/26/18 2145)  Temperature: 97 8 °F (36 6 °C) (01/26/18 1708)  Temp Source: Temporal (01/26/18 1708)  Respirations: 18 (01/26/18 2145)  Height: 5' 10" (177 8 cm) (01/26/18 1708)  Weight - Scale: 102 kg (224 lb) (01/26/18 1708)  SpO2: 97 % (01/26/18 2145)    Physical Exam   Constitutional: He appears well-developed and well-nourished  No distress  Awake and alert oriented x3, obese  HENT:   Head: Normocephalic and atraumatic  Eyes: Conjunctivae and EOM are normal  Pupils are equal, round, and reactive to light  Eyes are normal   Neck: Normal range of motion  Neck supple  Cardiovascular: Normal rate, regular rhythm and normal heart sounds  No murmur heard  Pulmonary/Chest: Effort normal and breath sounds normal  No respiratory distress  He has no wheezes  He exhibits no tenderness  Abdominal: Soft  Bowel sounds are normal  He exhibits no distension  There is no tenderness  Skin: He is not diaphoretic  Nursing note and vitals reviewed  Additional Data:     Lab Results: I have personally reviewed pertinent reports          Results from last 7 days  Lab Units 01/26/18  1925   WBC Thousand/uL 6 17   HEMOGLOBIN g/dL 11 5*   HEMATOCRIT % 35 6*   PLATELETS Thousands/uL 212   NEUTROS PCT % 56   LYMPHS PCT % 28   MONOS PCT % 10   EOS PCT % 5       Results from last 7 days  Lab Units 01/26/18  1925   SODIUM mmol/L 137   POTASSIUM mmol/L 4 6   CHLORIDE mmol/L 101   CO2 mmol/L 30   BUN mg/dL 32*   CREATININE mg/dL 1 62*   CALCIUM mg/dL 9 5   TOTAL PROTEIN g/dL 8 1   BILIRUBIN TOTAL mg/dL 0 30   ALK PHOS U/L 111   ALT U/L 23   AST U/L 21   GLUCOSE RANDOM mg/dL 137       Results from last 7 days  Lab Units 01/26/18  1925   INR  1 86*       Imaging: I have personally reviewed pertinent reports  CT head without contrast   Final Result by Lesli Lewis MD (01/26 2017)      No acute intracranial abnormality  Moderate chronic microvascular ischemia  Workstation performed: [id number]             EKG, Pathology, and Other Studies Reviewed on Admission:   · EKG:  Sinus    Allscripts / Epic Records Reviewed: Yes     ** Please Note: This note has been constructed using a voice recognition system   **

## 2018-01-27 NOTE — DISCHARGE SUMMARY
Discharge Summary - [facility] Internal Medicine    Patient Information: Yessy Pineda [de-identified] y o  male MRN: [id number]  Unit/Bed#: ED 20 Encounter: [id number]    Discharging Physician / Practitioner: Sapna Mercado MD  PCP: Milton Singh DO  Admission Date: 1/26/2018  Discharge Date: 01/27/18    Reason for Admission: possible accidental overdose    Discharge Diagnoses: Active Problems:    HTN (hypertension)    DM2 (diabetes mellitus, type 2) ([place] [address] )    Accidental overdose    At risk for polypharmacy    Family discord    Warfarin anticoagulation    JEANNINE (acute kidney injury) ([place] [address] )  Resolved Problems:    * No resolved hospital problems  *      Consultations During Hospital Stay:  · Neurology and PT    Procedures Performed:     ·     Significant Findings:     ·     Incidental Findings:   ·      Test Results Pending at Discharge (will require follow up):   ·      Outpatient Tests Requested:  · Home PT    Complications:      Hospital Course:     Yessy Pineda is a [de-identified] y o  male patient who originally presented to the hospital on 1/26/2018 due to accidental overdose possibly  Family was very concerned as patient was missing medications from his usual pillbox patient denies taking these but there is concern by family members that perhaps his girlfriend is taking them and hoarding them  There is concern as patient's girlfriend at home is not caring for him even though he is able to cook clean and to do his activities of adl  Patient was evaluated by Neurology and they felt he was safe to continue outpatient workup    He was evaluated by Physical therapy and felt he was safe for discharge with home physical therapy he was seen by  regarding other family issues and a referral is being made to [facility] agency on Aging    Condition at Discharge: stable     Discharge Day Visit / Exam:     * Please refer to separate progress for these details *    Discharge instructions/Information to patient and family: See after visit summary for information provided to patient and family  Provisions for Follow-Up Care:  See after visit summary for information related to follow-up care and any pertinent home health orders  Disposition:     Home    For Discharges to [facility] SN[facility]:   · Not Applicable to this Patient - Not Applicable to this Patient    Planned Readmission:     Discharge Statement:  I spent 40 minutes discharging the patient  This time was spent on the day of discharge  I had direct contact with the patient on the day of discharge  Greater than 50% of the total time was spent examining patient, answering all patient questions, arranging and discussing plan of care with patient as well as directly providing post-discharge instructions  Additional time then spent on discharge activities  Discharge Medications:  See after visit summary for reconciled discharge medications provided to patient and family  ** Please Note: Dragon 360 Dictation voice to text software may have been used in the creation of this document   **

## 2018-01-27 NOTE — CASE MANAGEMENT
Initial Clinical Review    Admission: Date/Time/Statement: Observation1/26 @ 2110    Orders Placed This Encounter   Procedures    Place in Observation (expected length of stay for this patient is less than two midnights)     Standing Status:   Standing     Number of Occurrences:   1     Order Specific Question:   Admitting Physician     Answer:   Eda Greene [[id number]]     Order Specific Question:   Level of Care     Answer:   Med Surg [[id number]]       ED: Date/Time/Mode of Arrival:   ED Arrival Information     Expected Arrival Acuity Means of Arrival Escorted By Service Admission Type    - 1/26/2018 16:49 Urgent Wheelchair Family Member General Medicine Urgent    Arrival Complaint    BULGING EYE, RX ISSUE          Chief Complaint:   Chief Complaint   Patient presents with    Eye Problem     PT'S SON STATES THAT THE PATIENT'S RIGHT EYE LOOKS LIKE IT IS BULGING     Overdose - Accidental     PT'S FAMILY WENT TO CHECK ON PATIENT AND THERE WERE MEDS MISSING FROM HIS PILL TRAY -  [address] PM DOSES AND SATURDAY AM - PT DENIES TAKING THEM BUT IS UNSURE WHAT HAPPENED TO THEM       History of Illness: [de-identified] y o  male history of hypertension , hyperlipidemia, previous strokes, CAD and diabetes mellitus type 2   who presents with chief complaint of family at bedside stating patient needs to be evaluated for accidental overdose  Patient's son report noted missing tablets from his pill box from Friday and Saturday  Patient's son and daughter at bedside report patient has had increased forgetfulness and is concerned that he may not be able to take care of himself at home  Patient himself reports he is fine, he is not concerned he has assistance, his girlfriend at home is able to help him  There is some underlying family discord at this time will consult case management for further assistance  Otherwise patient denies fever, chills, dizziness, lightheadedness, shortness of breath, cough, chest pain or any other discomfort    He does have a history of urinary frequency and this was addressed with his urologist     ED Vital Signs:   ED Triage Vitals [01/26/18 1708]   Temperature Pulse Respirations Blood Pressure SpO2   97 8 °F (36 6 °C) 61 18 165/77 95 %      Temp Source Heart Rate Source Patient Position - Orthostatic VS BP Location FiO2 (%)   Temporal Monitor Sitting Left arm --      Pain Score       8        Wt Readings from Last 1 Encounters:   01/26/18 102 kg (224 lb)       Vital Signs (abnormal):   Date/Time  Temp  Pulse  Resp  BP  SpO2  O2 Device  Patient Position - Orthostatic VS   01/27/18 0756  --  --  --   200/89  --  --  Lying   01/27/18 0730  --  75  19   220/116  97 %  None (Room air)  Lying       Abnormal Labs:    BUN 32 (H) 5 - 25 mg/dL     Creatinine 1 62 (H) 0 60 - 1 30 mg/dL       Updated: 01/26/18 1954      WBC 6 17 4 31 - 10 16 Thousand/uL     RBC 3 79 (L) 3 88 - 5 62 Million/uL     Hemoglobin 11 5 (L) 12 0 - 17 0 g/dL     Hematocrit 35 6 (L) 36 5 - 49 3 %        Diagnostic Test Results: CT Head - No acute intracranial abnormality    Moderate chronic microvascular ischemia      ED Treatment:   Medication Administration from 01/26/2018 1649 to 01/27/2018 7426       Date/Time Order Dose Route Action     01/27/2018 0817 amLODIPine (NORVASC) tablet 10 mg 10 mg Oral Given     01/27/2018 0817 aspirin chewable tablet 324 mg 324 mg Oral Given     01/27/2018 0918 cyanocobalamin (VITAMIN B-12) tablet 500 mcg 500 mcg Oral Given     01/27/2018 0918 doxazosin (CARDURA) tablet 4 mg 4 mg Oral Given     76/26/6736 7119 folic acid (FOLVITE) tablet 2 5 mg 2 5 mg Oral Given     01/27/2018 0818 gabapentin (NEURONTIN) capsule 100 mg 100 mg Oral Given     01/27/2018 0918 oxybutynin (DITROPAN) tablet 5 mg 5 mg Oral Given     01/27/2018 0918 pyridoxine (VITAMIN B6) tablet 100 mg 100 mg Oral Given     01/27/2018 0047 acetaminophen (TYLENOL) tablet 650 mg 650 mg Oral Given     01/27/2018 0824 hydrALAZINE (APRESOLINE) injection 10 mg 10 mg Intravenous Given          Past Medical/Surgical History: Active Ambulatory Problems     Diagnosis Date Noted    Stroke [facility]) 11/24/2017    HTN (hypertension) 11/24/2017    [facility], [facility] 11/24/2017    HLD (hyperlipidemia) 11/24/2017    CAD (coronary artery disease) 11/24/2017    Postoperative anemia 11/24/2017    Right hip pain 11/24/2017    Acute deep vein thrombosis (DVT) of brachial vein of left upper extremity ([facility] [address] ) 11/24/2017    Coagulopathy ([facility] ) 11/24/2017     Resolved Ambulatory Problems     Diagnosis Date Noted    No Resolved Ambulatory Problems     Past Medical History:   Diagnosis Date    CAD (coronary artery disease)     [facility]     History of transfusion     HLD (hyperlipidemia)     HTN (hypertension)        Admitting Diagnosis: Eye problem [H57 9]    Age/Sex: [de-identified] y o  male    Assessment/Plan:   Family discord   Assessment & Plan     · Patient's son and daughter at the bedside  They are concerned of his living arrangement with his girlfriend at home  They are also concerned that patient possibly ingested too much medication  They checked the pillbox and the medications for Friday and Saturday were missing  Family also verbalize would prefer for patient to go to an assisted living center rather than going back home  They report he has been exhibiting increase periods of forgetfulness  Patient is very adamant, refusing to go to assisted living center, nursing home  Patient is insisted to go going home at this time  Patient reports he has a girlfriend at home that is able to assist them  · Family at bedside patient's son and daughter reports inability to care for him  · I will consult case management to assist with safe discharge coordination  · Family requesting for Neurology to evaluate patient for possible dementia, I will consult    Although I find patient to be awake alert oriented x3 he is verbal, answers questions appropriately, he understands and responds to his medical conditions  I spent a total of about an hour discussing plan of care with patient, daughter and son at bedside  Advise in the a m  we will not be able to hold patient against his will if he decided to go home  · CT scan reviewed no acute findings         JEANNINE (acute kidney injury) ([facility] [address] )   Assessment & Plan     · Chart review creatinine gradually elevating within the past few months  · Will initiate gentle hydration  If not improved will consider Nephrology consult  · Will hold nephrotoxin agent  · Will monitor kidney function  In the a m        Warfarin anticoagulation   Assessment & Plan     · Subtherapeutic  · Per family at bedside patient takes Coumadin  · Continue 4 mg, monitor PT INR in the a m            Accidental overdose   Assessment & Plan     · At risk for polypharmacy:  Per family, Possible increased forgetfulness  Family at bedside report when they checked patient's pill box the doses for Friday and Saturday were missing  Was asked from PCP to come to ED for evaluation of overdose  · No acute signs of overdose noted  Will monitor patient  · UA reviewed       DM2 (diabetes mellitus, type 2) ([facility])   Assessment & Plan     · In obesity  · Recent A1c 7 2  Will reassess  · Will hold glyburide and initiate SSI  · Lifestyle modification, dietary measures encourage  · Low carb diet         HTN (hypertension)   Assessment & Plan     · Blood pressure control will continue to monitor    · Continue home regiment of Cardura and Norvasc,   · Will hold lisinopril secondary to mild JEANNINE           VTE Prophylaxis: Warfarin (Coumadin)  / sequential compression device   Code Status:  Full code  POLST: There is no POLST form on file for this patient (pre-hospital)  Discussion with family:  Daughter and son at bedside     Anticipated Length of Stay:  Patient will be admitted on an Observation basis with an anticipated length of stay of greater than 2 midnights  Justification for Hospital Stay:  JEANNINE      Admission Orders:  Sequential compression devcie  Neurology cons  PT/OT eval and treat    Scheduled Meds:   amLODIPine 10 mg Oral Daily   aspirin 324 mg Oral Daily   vitamin B-12 500 mcg Oral Daily   doxazosin 4 mg Oral Daily   folic acid 2 5 mg Oral Daily   gabapentin 100 mg Oral BID   oxybutynin 5 mg Oral Daily   pyridoxine 100 mg Oral Daily   warfarin 4 mg Oral Daily (warfarin)     Continuous Infusions:    PRN Meds:   acetaminophen    hydrALAZINE    ---------------------------------------------------------------------------------------------------------------    1/27 Neurology cons:  Assessment/plan  Change in mental status currently resolved    Questionable accidental overdose      Would recommend a complete cognitive testing as an outpatient, currently patient does not have any neurological deficits, would recommend physical therapy occupational therapy and rehab, patient may benefit from constant supervision for the next few weeks to see how he is doing, other management as per primary team
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
## 2018-01-27 NOTE — ASSESSMENT & PLAN NOTE
· Chart review creatinine gradually elevating within the past few months  · Will initiate gentle hydration  If not improved will consider Nephrology consult  · Will hold nephrotoxin agent

## 2018-01-27 NOTE — ASSESSMENT & PLAN NOTE
· At risk for polypharmacy:  Family at bedside report when they checked patient's pill box the doses for Friday and Saturday were missing  Was asked from PCP to come to ED for evaluation of overdose  · No acute signs of overdose noted  Will monitor patient

## 2018-01-27 NOTE — PLAN OF CARE
Problem: PHYSICAL THERAPY ADULT  Goal: Performs mobility at highest level of function for planned discharge setting  See evaluation for individualized goals  Treatment/Interventions: Functional transfer training, LE strengthening/ROM, Elevations, Therapeutic exercise, Endurance training, Patient/family training, Bed mobility, Gait training, Spoke to nursing, Family          See flowsheet documentation for full assessment, interventions and recommendations  Prognosis: Good  Problem List: Decreased strength, Decreased endurance, Impaired balance, Decreased mobility, Decreased cognition, Decreased safety awareness, Orthopedic restrictions, Pain  Assessment: pt is an 83y/o m who presents to [facility] s/p accidental drug overdose  family also reports increased "forgetfullness"  PMH significant for CVA, DM 2, CAD, DVT, JEANNINE, + R THR 11/2017  at baseline, pt mod (I) c functional mobility c RW  resides c girlfriend in mobile home c 4 DILMA  pt occassionally alone while girlfriend goes to visit her mother  pt admits to 1 fall in past 6 months + does not drive  currently requires min (A)x1 for functional mobility tasks 2* deficits in strength, balance, gait quality, safety awareness + activity tolerance noted in PT exam above  Barthel Index 60/100  ambulated distances of 100'x2 c RW c min verbal cues for safe technique  standing rest required btwn trials  pt at increased risk for fall evidenced by TUG score of 30sec  would benefit from skilled PT to maximize functional mobility + return to PLOF  may benefit from STR upon d/c vs hhpt if unable to achieve PT goals + pending formal OT cognitive assessment to determine appropriate level of (S) required for safety  PT eval of high complexity 2* unstable med status c pt requiring ongoing medical management s/p accidental drug overdose  currently pending neuro c/s per family request 2* pt's increased "forgetfullness"   pt c multiple co-morbidities + decline in mobility c mobility deficits noted above requiring min (A)x1 for mobility tasks  pt occassionally alone at home + at high fall risk  resides c girlfriend in mobile home c 4 DILMA  Barriers to Discharge: Inaccessible home environment, Decreased caregiver support     Recommendation: Home PT, Short-term skilled PT (pending progress + OT cognitive recs)     PT - OK to Discharge: No    See flowsheet documentation for full assessment

## 2018-01-27 NOTE — ASSESSMENT & PLAN NOTE
· Patient's son and daughter at the bedside  They are concerned of his living arrangement with his girlfriend at home  They are also concerned that patient possibly ingested too much medication  They checked the pillbox and the medications for Friday and Saturday were missing  Family also verbalize would prefer for patient to go to an assisted living center rather than going back home  They report he has been exhibiting increase periods of forgetfulness  Patient is very adamant, refusing to go to assisted living center, nursing home  Patient is insisted to go going home at this time  Patient reports he has a girlfriend at home that is able to assist them  · Family at bedside patient's son and daughter reports inability to care for him  · I will consult case management to assist with safe discharge coordination  · Family requesting for Neurology to evaluate patient for possible dementia, I will consult  Although I find patient to be awake alert oriented x3 he is verbal, answers questions appropriately, he understands and responds to his medical conditions  · CT scan reviewed no acute findings

## 2018-01-27 NOTE — SOCIAL WORK
CM received message from the ER that family does not feel that dischargi  ng the pt home is safe  CM spoke to Dr Valentin Ortiz  She will await PT recommendation and neuro eval before making any determination  CM will continue to follow

## 2018-01-27 NOTE — ASSESSMENT & PLAN NOTE
· In obesity  · Recent A1c 7 2  Will reassess  · Will hold glyburide and initiate SSI  · Lifestyle modification, dietary measures encourage    · Low carb diet
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
## 2018-01-27 NOTE — PHYSICAL THERAPY NOTE
PT Evaluation (19min)  (11:10-11:29)    Past Medical History:   Diagnosis Date    CAD (coronary artery disease)     DM2 [facility] mellitus[facility] type 2) [facility] [address] [facility]     History of transfusion     HLD (hyperlipidemia)     HTN (hypertension)         01/27/18 1129   Note Type   Note type Eval only   Pain Assessment   Pain Assessment No/denies pain  (reports R hip pain c movement only)   Home Living   Type of Home Mobile home   Home Layout One level;Stairs to enter with rails  (4 DILMA)   Home Equipment Walker;Crutches   Additional Comments receiving hhpt 2x/wk   Prior Function   Level of [place] Independent with ADLs and functional mobility  (ambulates c RW)   Lives With Significant other  (resides c girlfriend; occassionally alone)   Receives Help From Family  (supportive son + dtr)   ADL Assistance Independent   IADLs Needs assistance   Falls in the last 6 months 1 to 4   Vocational Retired   Comments (-) drive   Restrictions/Precautions   Other Precautions Cognitive;Telemetry; Fall Risk;Pain;THR  (s/p R THR 11/2017; family reports increased "forgetfullness")   General   Additional Pertinent History pt presents to [facility] s/p accidental drug overdose  currently pending neuro c/s 2* increased forgetfullness per family request  PT consulted for mobility + d/c planning  up c (A)  Family/Caregiver Present Yes  (pt's son)   Cognition   Orientation Level Oriented X4   RUE Assessment   RUE Assessment WFL  (4/5)   LUE Assessment   LUE Assessment WFL  (4/5)   RLE Assessment   RLE Assessment WFL  (4/5)   LLE Assessment   LLE Assessment WFL  (4/5)   Coordination   Sensation WFL   Bed Mobility   Supine to Sit 5  Supervision   Additional items HOB elevated; Increased time required   Sit to Supine 5  Supervision   Additional items HOB elevated; Increased time required   Transfers   Sit to Stand 4  Minimal assistance   Additional items Assist x 1;Verbal cues   Stand to Sit 4  Minimal assistance   Additional items Assist x 1;Verbal cues Ambulation/Elevation   Gait pattern Forward Flexion; Inconsistent ricardo; Short stride   Gait Assistance 4  Minimal assist   Additional items Assist x 1;Verbal cues   Assistive Device Rolling walker   Distance 100'x2 c standing rest   Balance   Static Sitting Good   Dynamic Sitting Good   Static Standing Fair -   Dynamic Standing Fair -   Ambulatory Fair -   Higher level balance (TUG score: 30sec)   Activity Tolerance   Activity Tolerance Patient limited by fatigue;Patient limited by pain   Nurse Made Aware Yani   Assessment   Prognosis Good   Problem List Decreased strength;Decreased endurance; Impaired balance;Decreased mobility; Decreased cognition;Decreased safety awareness;Orthopedic restrictions;Pain   Assessment pt is an [de-identified] m who [place] [facility] [place]  family also reports increased "forgetfullness"  PMH significant for CVA, DM 2, CAD, DVT, JEANNINE, + R THR 11/2017  at baseline, pt mod (I) c functional mobility c RW  resides c girlfriend in mobile home c 4 DILMA  pt occassionally alone while girlfriend goes to visit her mother  pt admits to 1 fall in past 6 months + does not drive  currently requires min (A)x1 for functional mobility tasks 2* deficits in strength, balance, gait quality, safety awareness + activity tolerance noted in PT exam above  Barthel Index 60/100  ambulated distances of 100'x2 c RW c min verbal cues for safe technique  standing rest required btwn trials  pt at increased risk for fall evidenced by TUG score of 30sec  would benefit from skilled PT to maximize functional mobility + return to PLOF  may benefit from STR upon d/c vs hhpt if unable to achieve PT goals + pending formal OT cognitive assessment to determine appropriate level of (S) required for safety  PT eval of high complexity 2* unstable med status c pt requiring ongoing medical management s/p accidental drug overdose  currently pending neuro c/s per family request 2* pt's increased "forgetfullness"   pt c multiple co-morbidities + decline in mobility c mobility deficits noted above requiring min (A)x1 for mobility tasks  pt occassionally alone at home + at high fall risk  resides c girlfriend in mobile home c 4 DILMA  Barriers to Discharge Inaccessible home environment;Decreased caregiver support   Goals   Patient Goals "to go home"  STG Expiration Date 02/06/18   Short Term Goal #1 1  increase strength 1/2 grade to improve overall functional mobility, 2  perform bed mobility mod (I) to sit up + eat meal, 3  safely perform transfers mod (I) to perform ADLs, 4  ambulate 300' mod (I) c RW to safely navigate home environment, 5  negotiate 4 stairs mod (I) to safely enter home, 6  improve TUG score to <15sec to decrease fall risk   Plan   Treatment/Interventions Functional transfer training;LE strengthening/ROM; Elevations; Therapeutic exercise; Endurance training;Patient/family training;Bed mobility;Gait training;Spoke to nursing;Family   PT Frequency 5x/wk   Recommendation   Recommendation Home PT; Short-term skilled PT  (pending progress + OT cognitive recs)   PT - OK to Discharge No   Barthel Index   Feeding 10   Bathing 0   Grooming Score 5   Dressing Score 5   Bladder Score 5   Bowels Score 10   Toilet Use Score 5   Transfers (Bed/Chair) Score 10   Mobility (Level Surface) Score 10   Stairs Score 0   Barthel Index Score 60     Ward Samano, PT

## 2018-01-27 NOTE — CONSULTS
Consultation - Neurology   Carmen Resendiz [de-identified] y o  male MRN: [id number]  Unit/Bed#: ED 20 Encounter: [id number]      Physician Requesting Consult: Eric Glynn MD  Chief complaint change in mental status    HPI:  61-year-old right-handed male with history of hypertension hyperlipidemia previous strokes, coronary artery disease and diabetes who was brought to the hospital by the for possible accidental overdose, patient's son reports missing pills from his pillbox and they are not sure if patient took extra medication, there is also some concern the patient at times might have some confusion, currently patient is denying having any headaches no confusion, according to the son he thinks that father is at baseline, no history of seizures no falls no recent illness, no bowel and bladder incontinence, no other neurological complaints  Review of Systems:    Historical Information   Past Medical History:   Diagnosis Date    CAD (coronary artery disease)     [facility]     History of transfusion     HLD (hyperlipidemia)     HTN (hypertension)      Past Surgical History:   Procedure Laterality Date    HIP HARDWARE REMOVAL      TOTAL HIP ARTHROPLASTY Right      Social History   History   Smoking Status    Never Smoker   Smokeless Tobacco    Never Used     History   Alcohol Use No     History   Drug Use No       Family History:   Family History   Problem Relation Age of Onset    Family history unknown:  Yes       Allergies   Allergen Reactions    Celecoxib     Hydromorphone     Pravastatin Hives    Statins        Meds:  All current active meds have been reviewed    Scheduled Meds:  amLODIPine 10 mg Oral Daily   aspirin 324 mg Oral Daily   vitamin B-12 500 mcg Oral Daily   doxazosin 4 mg Oral Daily   folic acid 2 5 mg Oral Daily   gabapentin 100 mg Oral BID   oxybutynin 5 mg Oral Daily   pyridoxine 100 mg Oral Daily   warfarin 4 mg Oral Daily (warfarin)     PRN Meds:   acetaminophen   hydrALAZINE    Physical Exam:   Objective   Vitals:   Vitals:    01/27/18 1100   BP: 152/70   Pulse: 69   Resp: 16   Temp:    SpO2: 98%   ,Body mass index is 32 14 kg/m²  General appearance: Cooperative in no acute distress  Head & neck head is atraumatic and normocephalic  Neck is supple with full range of motion  Cardiovascular: Carotid arteriesno carotid bruits  Neurologic:   Mental statusthe patient is awake alert and oriented without aphasia or apraxia  Other high cortical  intellectual functions are intact  CN: Visual fields are full to confrontation, disks are flat  Extraocular movements are full without nystagmus  Pupils are 3 mm and reactive  Face is symmetrical to light touch  Movements of facial expression move symmetrically  Hearing is normal to finger rub bilaterally  Soft palate lifts symmetrically  Shoulder shrug is symmetrical  Tongue is midline without atrophy  Motor: No drift is noted on arm extension  Strength is full in the upper and lower extremities with normal bulk and tone  Slight poor effort in the right lower extremity  secondary to right hip pain,  Sensory: Intact to temperature and vibratory sensation in the upper and lower extremities bilaterally  Cortical function is intact  Coordination: Finger to nose testing is performed accurately  Patient is able to ambulate as per the nursing staff with physical therapy  Reflexes: 1/4 and symmetrical in the biceps, triceps, brachial radialis, knee jerk and ankle jerk regions  Toes are downgoing  CT scan of the brain without any evidence of acute stroke  Assessment/plan  Change in mental status currently resolved  Questionable accidental overdose      Would recommend a complete cognitive testing as an outpatient, currently patient does not have any neurological deficits, would recommend physical therapy occupational therapy and rehab, patient may benefit from constant supervision for the next few weeks to see how he is doing, other management as per primary team               Dana Rain MD  1/27/2018,12:14 PM    Dictation voice to text software has been used in the creation of this document

## 2018-01-27 NOTE — PLAN OF CARE
DISCHARGE PLANNING     Discharge to home or other facility with appropriate resources Progressing        Knowledge Deficit     Patient/family/caregiver demonstrates understanding of disease process, treatment plan, medications, and discharge instructions Progressing        Potential for Falls     Patient will remain free of falls Progressing        SAFETY ADULT     Maintain or return to baseline ADL function Progressing     Maintain or return mobility status to optimal level Progressing     Patient will remain free of falls Progressing

## 2018-01-27 NOTE — PLAN OF CARE
Problem: DISCHARGE PLANNING - CARE MANAGEMENT  Goal: Discharge to post-acute care or home with appropriate resources  INTERVENTIONS:  - Conduct assessment to determine patient/family and health care team treatment goals, and need for post-acute services based on payer coverage, community resources, and patient preferences, and barriers to discharge  - Address psychosocial, clinical, and financial barriers to discharge as identified in assessment in conjunction with the patient/family and health care team  - Arrange appropriate level of post-acute services according to patients   needs and preference and payer coverage in collaboration with the physician and health care team  - Communicate with and update the patient/family, physician, and health care team regarding progress on the discharge plan  - Arrange appropriate transportation to post-acute venues  Outcome: Progressing  CM received message from the ER that family does not feel that dischargi  ng the pt home is safe  CM spoke to Dr Anjali Sanders  She will await PT recommendation and neuro eval before making any determination  CM will continue to follow

## 2018-01-27 NOTE — ASSESSMENT & PLAN NOTE
· Blood pressure control will continue to monitor  · Continue home regiment of Cardura and Norvasc,   · Will hold lisinopril secondary to mild JEANNINE

## 2018-01-29 LAB
ATRIAL RATE: 58 BPM
P AXIS: 88 DEGREES
PR INTERVAL: 238 MS
QRS AXIS: 80 DEGREES
QRSD INTERVAL: 150 MS
QT INTERVAL: 470 MS
QTC INTERVAL: 461 MS
T WAVE AXIS: 57 DEGREES
VENTRICULAR RATE: 58 BPM

## 2018-01-29 PROCEDURE — 93010 ELECTROCARDIOGRAM REPORT: CPT | Performed by: INTERNAL MEDICINE

## 2018-01-30 ENCOUNTER — TELEPHONE (OUTPATIENT)
Dept: INTERNAL MEDICINE CLINIC | Facility: CLINIC | Age: 83
End: 2018-01-30

## 2018-01-30 DIAGNOSIS — G89.4 CHRONIC PAIN SYNDROME: Primary | ICD-10-CM

## 2018-01-30 RX ORDER — GABAPENTIN 100 MG
100 CAPSULE ORAL 2 TIMES DAILY
Qty: 60 CAPSULE | Refills: 2 | Status: SHIPPED | OUTPATIENT
Start: 2018-01-30 | End: 2018-04-23 | Stop reason: SDUPTHER

## 2018-01-30 RX ORDER — TRAMADOL HYDROCHLORIDE 50 MG/1
50 TABLET ORAL EVERY 6 HOURS PRN
Qty: 30 TABLET | Refills: 0 | Status: SHIPPED | OUTPATIENT
Start: 2018-01-30 | End: 2018-04-19

## 2018-02-01 DIAGNOSIS — E53.8 FOLIC ACID DEFICIENCY: Primary | ICD-10-CM

## 2018-02-01 LAB — INR PPP: 3.3 (ref 0.86–1.16)

## 2018-02-01 RX ORDER — FOLIC ACID 1 MG/1
2.5 TABLET ORAL DAILY
Qty: 30 TABLET | Refills: 0 | Status: SHIPPED | OUTPATIENT
Start: 2018-02-01 | End: 2018-02-02 | Stop reason: SDUPTHER

## 2018-02-01 NOTE — TELEPHONE ENCOUNTER
Son called-he's out of folic acid-he said the bleed has stopped so they're wondering if it even needs to be continued? Or if he Dr Balbina Franco wants him to continue taking them, he needs a refill  Pt uses [facility] in [place]        -------    Home Care agency has been coming in to take blood for the cumidum levels for this week  He's been giving the pt the same dosage of meds-but would like some confirmation as to dose   Please call Son, Norberto Isabel

## 2018-02-02 DIAGNOSIS — E53.8 FOLIC ACID DEFICIENCY: ICD-10-CM

## 2018-02-02 NOTE — TELEPHONE ENCOUNTER
Patient son Alexi Manley is at pharmacy now to  the folic acid, [facility]  Claims not rx was sent over    Please resent or call it in he will be waiting pt  Needs the meds today!!!,

## 2018-02-05 ENCOUNTER — TELEPHONE (OUTPATIENT)
Dept: INTERNAL MEDICINE CLINIC | Facility: CLINIC | Age: 83
End: 2018-02-05

## 2018-02-05 RX ORDER — FOLIC ACID 1 MG/1
TABLET ORAL
Qty: 30 TABLET | Refills: 0 | Status: SHIPPED | OUTPATIENT
Start: 2018-02-05 | End: 2018-03-08 | Stop reason: ALTCHOICE

## 2018-02-05 NOTE — TELEPHONE ENCOUNTER
O/T calling to report a fall on 2/4/18  Pt s toe hit top of step on staircase and he did a belly flop  Minor brush burn on elbow and increased back pain complaints  Otherwise, moving and dressing ok

## 2018-02-06 ENCOUNTER — TELEPHONE (OUTPATIENT)
Dept: INTERNAL MEDICINE CLINIC | Facility: CLINIC | Age: 83
End: 2018-02-06

## 2018-02-06 ENCOUNTER — ANTICOAG VISIT (OUTPATIENT)
Dept: INTERNAL MEDICINE CLINIC | Facility: CLINIC | Age: 83
End: 2018-02-06

## 2018-02-06 DIAGNOSIS — I48.91 ATRIAL FIBRILLATION, UNSPECIFIED TYPE (HCC): Primary | ICD-10-CM

## 2018-02-08 ENCOUNTER — OFFICE VISIT (OUTPATIENT)
Dept: INTERNAL MEDICINE CLINIC | Facility: CLINIC | Age: 83
End: 2018-02-08
Payer: MEDICARE

## 2018-02-08 ENCOUNTER — TELEPHONE (OUTPATIENT)
Dept: INTERNAL MEDICINE CLINIC | Facility: CLINIC | Age: 83
End: 2018-02-08

## 2018-02-08 ENCOUNTER — APPOINTMENT (OUTPATIENT)
Dept: LAB | Facility: CLINIC | Age: 83
End: 2018-02-08
Payer: MEDICARE

## 2018-02-08 ENCOUNTER — ANTICOAG VISIT (OUTPATIENT)
Dept: INTERNAL MEDICINE CLINIC | Facility: CLINIC | Age: 83
End: 2018-02-08

## 2018-02-08 ENCOUNTER — HOSPITAL ENCOUNTER (OUTPATIENT)
Dept: ULTRASOUND IMAGING | Facility: HOSPITAL | Age: 83
Discharge: HOME | End: 2018-02-08
Payer: MEDICARE

## 2018-02-08 VITALS
OXYGEN SATURATION: 98 % | HEART RATE: 56 BPM | SYSTOLIC BLOOD PRESSURE: 132 MMHG | HEIGHT: 70 IN | BODY MASS INDEX: 30.49 KG/M2 | WEIGHT: 213 LBS | DIASTOLIC BLOOD PRESSURE: 60 MMHG

## 2018-02-08 DIAGNOSIS — I10 ESSENTIAL HYPERTENSION: ICD-10-CM

## 2018-02-08 DIAGNOSIS — I82.622 ARM DVT (DEEP VENOUS THROMBOEMBOLISM), ACUTE, LEFT (HCC): ICD-10-CM

## 2018-02-08 DIAGNOSIS — I25.810 CORONARY ARTERY DISEASE INVOLVING CORONARY BYPASS GRAFT WITHOUT ANGINA PECTORIS, UNSPECIFIED WHETHER NATIVE OR TRANSPLANTED HEART: ICD-10-CM

## 2018-02-08 DIAGNOSIS — I63.10 CEREBROVASCULAR ACCIDENT (CVA) DUE TO EMBOLISM OF PRECEREBRAL ARTERY (HCC): ICD-10-CM

## 2018-02-08 DIAGNOSIS — E11.8 TYPE 2 DIABETES MELLITUS WITH COMPLICATION, WITHOUT LONG-TERM CURRENT USE OF INSULIN (HCC): ICD-10-CM

## 2018-02-08 DIAGNOSIS — I48.91 ATRIAL FIBRILLATION, UNSPECIFIED TYPE (HCC): ICD-10-CM

## 2018-02-08 DIAGNOSIS — I82.622 ARM DVT (DEEP VENOUS THROMBOEMBOLISM), ACUTE, LEFT (HCC): Primary | ICD-10-CM

## 2018-02-08 LAB
INR PPP: 1.94 (ref 0.86–1.16)
PROTHROMBIN TIME: 22.3 SECONDS (ref 12.1–14.4)

## 2018-02-08 PROCEDURE — 93971 EXTREMITY STUDY: CPT

## 2018-02-08 PROCEDURE — 85610 PROTHROMBIN TIME: CPT

## 2018-02-08 PROCEDURE — 36415 COLL VENOUS BLD VENIPUNCTURE: CPT

## 2018-02-08 PROCEDURE — 99214 OFFICE O/P EST MOD 30 MIN: CPT | Performed by: INTERNAL MEDICINE

## 2018-02-08 NOTE — TELEPHONE ENCOUNTER
----- Message from Kirsten Rinne, DO sent at 2/8/2018  3:44 PM EST -----  INR is 1 94  Tell patient to take 4 milligrams alternating with 2 milligrams nightly    Repeat in 1 week

## 2018-02-09 DIAGNOSIS — E13.9 DIABETES 1.5, MANAGED AS TYPE 1 (HCC): Primary | ICD-10-CM

## 2018-02-09 DIAGNOSIS — I10 ESSENTIAL HYPERTENSION: ICD-10-CM

## 2018-02-09 PROCEDURE — 93971 EXTREMITY STUDY: CPT | Performed by: SURGERY

## 2018-02-09 NOTE — PROGRESS NOTES
Assessment/Plan:  He has multiple problems  Regarding the leg pain this will be addressed by his pain management doctor  He has a history of thrombus to his whole left arm  There is a question of thrombus to his right leg  I sent him over to the hospital and the report was that the thrombus in his left arm is basically resolved  There was no DVT in either leg  Regarding his blood sugars they are said to be under good control at the present time  He has not seen his cardiologist lately and I have asked him to do so  He also because of his stroke should see his neurologist     I will see him back here in a month  At that time we will discuss how long to stay on the Coumadin  He was told at the nursing home that he should stay on it for 6 months  No problem-specific Assessment & Plan notes found for this encounter  Diagnoses and all orders for this visit:    Arm DVT (deep venous thromboembolism), acute, left (HCC)  -     Cancel: VAS lower limb venous duplex study, complete bilateral; Future  -     VAS upper limb venous duplex scan, unilateral/limited; Future  -     VAS lower limb venous duplex study, unilateral/limited; Future    Coronary artery disease involving coronary bypass graft without angina pectoris, unspecified whether native or transplanted heart          Subjective:  He comes in for follow-up  He has been having a rough time  He did have a period of confusion  He was taken to the emergency room  He may have taken his medicines inappropriately  He is brought in by his son today  He is using a walker to walk  His biggest complaint is that he has right leg pain  He saw a pain management doctor wants to give him an injection but I advised him not to do so for the moment since he is anticoagulated and had a thrombus to his left arm and possibly to his leg with the last several months    In any case they seem to think that the right leg pain is not from his hip surgery but is related to sciatica  Patient ID: Gorge Mallory is a [de-identified] y o  male  HPI he has multiple problems including 1  CVA 2  Status post hip replacement 3  DVT of the left arm 4  Diabetes  5   Hypertension 6  Coronary artery disease    The following portions of the patient's history were reviewed and updated as appropriate: allergies, current medications, past family history, past medical history, past social history, past surgical history and problem list     Review of Systems   Constitutional: Positive for appetite change and fatigue  Negative for activity change, chills, diaphoresis, fever and unexpected weight change  HENT: Negative for congestion, ear pain, hearing loss, mouth sores, nosebleeds, postnasal drip, sinus pain, sinus pressure, sore throat and trouble swallowing  Eyes: Negative for pain, discharge and visual disturbance  Respiratory: Positive for shortness of breath  Negative for apnea, cough, chest tightness and wheezing  Cardiovascular: Negative for chest pain, palpitations and leg swelling  Gastrointestinal: Negative for abdominal pain, anal bleeding, blood in stool, constipation, diarrhea, nausea and vomiting  Endocrine: Negative for polydipsia and polyphagia  Genitourinary: Negative for decreased urine volume, dysuria, flank pain, frequency, hematuria and urgency  Musculoskeletal: Positive for arthralgias and gait problem  Negative for back pain, joint swelling and myalgias  He is using a walker to walk at all times   Skin: Negative for rash and wound  Allergic/Immunologic: Negative for environmental allergies and food allergies  Neurological: Negative for dizziness, tremors, seizures, syncope, speech difficulty, light-headedness, numbness and headaches  He did have some confusion  He was taken to the emergency room he is doing better at the present time  Hematological: Negative for adenopathy  Does not bruise/bleed easily     Psychiatric/Behavioral: Negative for agitation, confusion, hallucinations, sleep disturbance and suicidal ideas  The patient is not nervous/anxious  Objective:     Physical Exam   Constitutional: He appears well-developed and well-nourished  No distress  He looks quite tired  He is awake and alert however  HENT:   Head: Normocephalic  Right Ear: External ear normal    Left Ear: External ear normal    Nose: Nose normal    Mouth/Throat: Oropharynx is clear and moist  No oropharyngeal exudate  Eyes: Conjunctivae and EOM are normal  Pupils are equal, round, and reactive to light  Right eye exhibits no discharge  Left eye exhibits no discharge  Neck: Normal range of motion  Neck supple  No thyromegaly present  Cardiovascular: Normal rate, regular rhythm and normal heart sounds  Exam reveals no gallop and no friction rub  No murmur heard  Pulmonary/Chest: Effort normal and breath sounds normal  No respiratory distress  He has no wheezes  He has no rales  His oxygen saturation is 98%  Abdominal: Soft  Bowel sounds are normal  He exhibits no distension and no mass  There is no tenderness  There is no rebound and no guarding  Abdomen is mildly overweight   Musculoskeletal: Normal range of motion  He exhibits no edema, tenderness or deformity  He has pain in his right leg  He uses a walker to walk  Lymphadenopathy:     He has no cervical adenopathy  Neurological: He is alert  He has normal reflexes  No cranial nerve deficit  Coordination normal    Skin: Skin is warm and dry  No rash noted  No erythema  Psychiatric: He has a normal mood and affect  His behavior is normal  Judgment and thought content normal    Nursing note and vitals reviewed

## 2018-02-12 ENCOUNTER — TELEPHONE (OUTPATIENT)
Dept: INTERNAL MEDICINE CLINIC | Facility: CLINIC | Age: 83
End: 2018-02-12

## 2018-02-12 RX ORDER — ERGOCALCIFEROL 1.25 MG/1
CAPSULE ORAL
Qty: 4 CAPSULE | Refills: 0 | Status: SHIPPED | OUTPATIENT
Start: 2018-02-12 | End: 2018-04-19

## 2018-02-12 RX ORDER — DOXAZOSIN MESYLATE 4 MG/1
TABLET ORAL
Qty: 30 TABLET | Refills: 0 | Status: SHIPPED | OUTPATIENT
Start: 2018-02-12 | End: 2018-03-08 | Stop reason: ALTCHOICE

## 2018-02-12 RX ORDER — GLYBURIDE 2.5 MG/1
TABLET ORAL
Qty: 30 TABLET | Refills: 0 | Status: SHIPPED | OUTPATIENT
Start: 2018-02-12 | End: 2018-03-02 | Stop reason: SDUPTHER

## 2018-02-12 RX ORDER — LISINOPRIL 40 MG/1
TABLET ORAL
Qty: 30 TABLET | Refills: 0 | Status: SHIPPED | OUTPATIENT
Start: 2018-02-12 | End: 2018-04-24 | Stop reason: SDUPTHER

## 2018-02-12 RX ORDER — METOPROLOL TARTRATE 100 MG/1
TABLET ORAL
Qty: 60 TABLET | Refills: 0 | Status: SHIPPED | OUTPATIENT
Start: 2018-02-12 | End: 2018-05-09 | Stop reason: SDUPTHER

## 2018-02-16 ENCOUNTER — LAB REQUISITION (OUTPATIENT)
Dept: LAB | Facility: HOSPITAL | Age: 83
End: 2018-02-16
Payer: MEDICARE

## 2018-02-16 ENCOUNTER — ANTICOAG VISIT (OUTPATIENT)
Dept: INTERNAL MEDICINE CLINIC | Facility: CLINIC | Age: 83
End: 2018-02-16

## 2018-02-16 ENCOUNTER — TELEPHONE (OUTPATIENT)
Dept: INTERNAL MEDICINE CLINIC | Facility: CLINIC | Age: 83
End: 2018-02-16

## 2018-02-16 DIAGNOSIS — I69.354 HEMIPLEGIA AND HEMIPARESIS FOLLOWING CEREBRAL INFARCTION AFFECTING LEFT NON-DOMINANT SIDE (HCC): ICD-10-CM

## 2018-02-16 DIAGNOSIS — I25.10 ATHEROSCLEROTIC HEART DISEASE OF NATIVE CORONARY ARTERY WITHOUT ANGINA PECTORIS: ICD-10-CM

## 2018-02-16 LAB
INR PPP: 2.54 (ref 0.86–1.16)
PROTHROMBIN TIME: 28.1 SECONDS (ref 12.1–14.4)

## 2018-02-16 PROCEDURE — 85610 PROTHROMBIN TIME: CPT | Performed by: INTERNAL MEDICINE

## 2018-02-16 RX ORDER — WARFARIN SODIUM 4 MG/1
4 TABLET ORAL DAILY
Refills: 1 | COMMUNITY
Start: 2018-01-15 | End: 2018-03-18 | Stop reason: SDUPTHER

## 2018-02-16 RX ORDER — METAXALONE 800 MG/1
800 TABLET ORAL 3 TIMES DAILY PRN
Refills: 0 | COMMUNITY
Start: 2018-01-17 | End: 2018-03-08 | Stop reason: ALTCHOICE

## 2018-02-16 NOTE — TELEPHONE ENCOUNTER
Johanna Fairchild called from [address] [facility] [facility]  She solomon pts blood and dropped off at [facility] lab this morning  Calling about getting results/changes in coumadin   Please call her at [phone]

## 2018-02-16 NOTE — TELEPHONE ENCOUNTER
----- Message from Reeda Gosselin, DO sent at 2/16/2018  3:51 PM EST -----  INR is 2 54    Same dose and repeat 1 week

## 2018-02-19 ENCOUNTER — TELEPHONE (OUTPATIENT)
Dept: INTERNAL MEDICINE CLINIC | Facility: CLINIC | Age: 83
End: 2018-02-19

## 2018-02-19 NOTE — TELEPHONE ENCOUNTER
Belia Simmons from [facility] called to report Pt's fall:    Pt fell Friday Morning trying to leave the house by himself using his walker  The dwight door threw him off balance  And he landed on knees  He was able to get back up-not injured and no pain   If there are any questions call Belia Degree at [phone]

## 2018-02-20 DIAGNOSIS — I25.83 CORONARY ARTERY DISEASE DUE TO LIPID RICH PLAQUE: Primary | ICD-10-CM

## 2018-02-20 DIAGNOSIS — I25.10 CORONARY ARTERY DISEASE DUE TO LIPID RICH PLAQUE: Primary | ICD-10-CM

## 2018-02-20 RX ORDER — CLOPIDOGREL BISULFATE 75 MG/1
TABLET ORAL
Qty: 90 TABLET | Refills: 1 | Status: SHIPPED | OUTPATIENT
Start: 2018-02-20 | End: 2018-03-08 | Stop reason: ALTCHOICE

## 2018-02-23 ENCOUNTER — TELEPHONE (OUTPATIENT)
Dept: INTERNAL MEDICINE CLINIC | Facility: CLINIC | Age: 83
End: 2018-02-23

## 2018-02-23 ENCOUNTER — ANTICOAG VISIT (OUTPATIENT)
Dept: INTERNAL MEDICINE CLINIC | Facility: CLINIC | Age: 83
End: 2018-02-23

## 2018-02-23 LAB — INR PPP: 4 (ref 0.86–1.16)

## 2018-02-23 NOTE — TELEPHONE ENCOUNTER
I spoke w/ dr Sheila Ramirez, he said hold for 2 days, take 2 mg Sunday and recheck inr Monday  I called nick and spoke w/ andrey, they will recheck Monday  I also spoke w/ pt, he wrote down instructions and repeated them back to me and understood

## 2018-02-26 ENCOUNTER — TELEPHONE (OUTPATIENT)
Dept: INTERNAL MEDICINE CLINIC | Facility: CLINIC | Age: 83
End: 2018-02-26

## 2018-02-26 ENCOUNTER — ANTICOAG VISIT (OUTPATIENT)
Dept: INTERNAL MEDICINE CLINIC | Facility: CLINIC | Age: 83
End: 2018-02-26

## 2018-02-26 ENCOUNTER — LAB REQUISITION (OUTPATIENT)
Dept: LAB | Facility: HOSPITAL | Age: 83
End: 2018-02-26
Payer: MEDICARE

## 2018-02-26 DIAGNOSIS — D68.9 COAGULOPATHY (HCC): Primary | ICD-10-CM

## 2018-02-26 DIAGNOSIS — I25.10 ATHEROSCLEROTIC HEART DISEASE OF NATIVE CORONARY ARTERY WITHOUT ANGINA PECTORIS: ICD-10-CM

## 2018-02-26 DIAGNOSIS — I69.354 HEMIPLEGIA AND HEMIPARESIS FOLLOWING CEREBRAL INFARCTION AFFECTING LEFT NON-DOMINANT SIDE (HCC): ICD-10-CM

## 2018-02-26 LAB
INR PPP: 1.62 (ref 0.86–1.16)
PROTHROMBIN TIME: 19.6 SECONDS (ref 12.1–14.4)

## 2018-02-26 PROCEDURE — 85610 PROTHROMBIN TIME: CPT | Performed by: INTERNAL MEDICINE

## 2018-02-26 NOTE — TELEPHONE ENCOUNTER
Giulia wanted Dr Crista Foster to know that she dropped  patient's blood work off at [facility] about 1 hour ago

## 2018-02-26 NOTE — TELEPHONE ENCOUNTER
SHE NEEDS ORDER FOR PT / INR PUT INTO [facility]  Jeff Johnson SHE IS DRAWING THE BLOOD NOW & WILL TAKE TO [facility] LAB BY 1:00 TODAY    SO NEEDS THE ORDER IN EPIC  Jeff Johnson  PLS

## 2018-03-02 DIAGNOSIS — E13.9 DIABETES 1.5, MANAGED AS TYPE 1 (HCC): ICD-10-CM

## 2018-03-05 RX ORDER — OXYBUTYNIN CHLORIDE 5 MG/1
5 TABLET ORAL DAILY
Qty: 90 TABLET | Refills: 3 | Status: SHIPPED | OUTPATIENT
Start: 2018-03-05 | End: 2018-04-19

## 2018-03-05 RX ORDER — GLYBURIDE 2.5 MG/1
2.5 TABLET ORAL DAILY
Qty: 30 TABLET | Refills: 0 | Status: SHIPPED | OUTPATIENT
Start: 2018-03-05 | End: 2018-03-15 | Stop reason: SDUPTHER

## 2018-03-08 ENCOUNTER — OFFICE VISIT (OUTPATIENT)
Dept: INTERNAL MEDICINE CLINIC | Facility: CLINIC | Age: 83
End: 2018-03-08
Payer: MEDICARE

## 2018-03-08 VITALS
DIASTOLIC BLOOD PRESSURE: 70 MMHG | BODY MASS INDEX: 30.42 KG/M2 | SYSTOLIC BLOOD PRESSURE: 160 MMHG | HEART RATE: 52 BPM | WEIGHT: 212 LBS

## 2018-03-08 DIAGNOSIS — I82.622 ACUTE DEEP VEIN THROMBOSIS (DVT) OF BRACHIAL VEIN OF LEFT UPPER EXTREMITY (HCC): ICD-10-CM

## 2018-03-08 DIAGNOSIS — E11.9 TYPE 2 DIABETES MELLITUS WITHOUT COMPLICATION, WITHOUT LONG-TERM CURRENT USE OF INSULIN (HCC): ICD-10-CM

## 2018-03-08 DIAGNOSIS — Z79.01 WARFARIN ANTICOAGULATION: ICD-10-CM

## 2018-03-08 DIAGNOSIS — E78.5 HYPERLIPIDEMIA, UNSPECIFIED HYPERLIPIDEMIA TYPE: ICD-10-CM

## 2018-03-08 DIAGNOSIS — I25.83 CORONARY ARTERY DISEASE DUE TO LIPID RICH PLAQUE: Primary | ICD-10-CM

## 2018-03-08 DIAGNOSIS — I10 ESSENTIAL HYPERTENSION: ICD-10-CM

## 2018-03-08 DIAGNOSIS — I25.10 CORONARY ARTERY DISEASE DUE TO LIPID RICH PLAQUE: Primary | ICD-10-CM

## 2018-03-08 DIAGNOSIS — I63.9 CEREBROVASCULAR ACCIDENT (CVA), UNSPECIFIED MECHANISM (HCC): ICD-10-CM

## 2018-03-08 PROCEDURE — 99215 OFFICE O/P EST HI 40 MIN: CPT | Performed by: INTERNAL MEDICINE

## 2018-03-08 NOTE — PROGRESS NOTES
Assessment/Plan:  He has multiple issues  He remains anticoagulated for the moment  In the hospital they recommended 6 months of anticoagulation  Recent ultrasound of his left arm and right leg showed no residual thrombus  He needs to see his cardiologist as to how long he should stay anticoagulated and whether or not to stop Coumadin and resume Plavix  He also has not yet seen the neurologist which needs to be done as soon as possible I explained this to his son who says he will do so  The patient is encouraged not to go out in bad weather and to take every precaution not to fall  If he has a fall he should call an ambulance and go to the emergency room  He is not to drive  I reviewed the reviewed his blood sugars and they are stable  His blood pressure is stable  I will see him back here in 6 weeks  He will continue monitoring his blood sugars  In addition he can discontinue his weekly vitamin D and take a 1000 units of vitamin-D per day  No problem-specific Assessment & Plan notes found for this encounter  Diagnoses and all orders for this visit:    Coronary artery disease due to lipid rich plaque    Cerebrovascular accident (CVA), unspecified mechanism ([facility] [address] )  -     Ambulatory referral to Neurology; Future    Other orders  -     Hm Colonoscopy          Subjective:  Problems are 1  History of stroke 2  Coronary artery disease 3  Hypertension 4  Diabetes 5  History of embolus to right leg and left arm  Patient ID: Avril Buckley is a [de-identified] y o  male  HPI comes in for follow-up  He had a fall  He was out doing things that he should do  He struck his left anterior chest and has some ecchymosis  No pain at the present time  He is advised in the strongest of terms not to go out in any kind of bad weather icy conditions snowy or what conditions or cold weather  He is to use his cane at all times or his walker  He is not to drive      He has not seen a cardiologist or the neurologist to this point  He is no longer on Plavix but is on baby aspirin and Coumadin because of his thrombi  The following portions of the patient's history were reviewed and updated as appropriate: allergies, current medications, past family history, past medical history, past social history, past surgical history and problem list     Review of Systems   Constitutional: Negative for activity change, appetite change, chills, diaphoresis, fatigue, fever and unexpected weight change  He has an ecchymotic area on his left anterior chest    HENT: Positive for hearing loss  Negative for congestion, ear pain, mouth sores, nosebleeds, postnasal drip, sinus pain, sinus pressure, sore throat and trouble swallowing  Eyes: Negative for pain, discharge and visual disturbance  Respiratory: Negative for apnea, cough, chest tightness, shortness of breath and wheezing  Cardiovascular: Negative for chest pain, palpitations and leg swelling  Gastrointestinal: Negative for abdominal pain, anal bleeding, blood in stool, constipation, diarrhea, nausea and vomiting  Endocrine: Negative for polydipsia and polyphagia  His blood sugars are well controlled  Genitourinary: Negative for decreased urine volume, dysuria, flank pain, frequency, hematuria and urgency  Musculoskeletal: Negative for arthralgias, back pain, gait problem, joint swelling and myalgias  Skin: Negative for rash and wound  Allergic/Immunologic: Negative for environmental allergies and food allergies  Neurological: Negative for dizziness, tremors, seizures, syncope, speech difficulty, light-headedness, numbness and headaches  He has right leg weakness and right-sided hip and leg pain  He is followed by the orthopedist    Hematological: Negative for adenopathy  Does not bruise/bleed easily  Psychiatric/Behavioral: Negative for agitation, confusion, hallucinations, sleep disturbance and suicidal ideas   The patient is not nervous/anxious  Objective:     Physical Exam   Constitutional: No distress  He is awake alert and in no acute distress  His blood pressure is 120/70   HENT:   Head: Normocephalic  Right Ear: External ear normal    Left Ear: External ear normal    Nose: Nose normal    Mouth/Throat: Oropharynx is clear and moist  No oropharyngeal exudate  Eyes: Conjunctivae and EOM are normal  Pupils are equal, round, and reactive to light  Right eye exhibits no discharge  Left eye exhibits no discharge  Neck: Normal range of motion  Neck supple  No thyromegaly present  Cardiovascular: Normal rate, regular rhythm, normal heart sounds and intact distal pulses  Exam reveals no gallop and no friction rub  No murmur heard  Pulmonary/Chest: Effort normal and breath sounds normal  No respiratory distress  He has no wheezes  He has no rales  He has an ecchymotic area on his left anterior chest wall  Abdominal: Soft  Bowel sounds are normal  He exhibits no distension and no mass  There is no tenderness  There is no rebound and no guarding  Musculoskeletal: Normal range of motion  He exhibits no edema, tenderness or deformity  Lymphadenopathy:     He has no cervical adenopathy  Neurological: He is alert  He has normal reflexes  No cranial nerve deficit  Coordination normal    Skin: Skin is warm and dry  No rash noted  No erythema  Psychiatric: He has a normal mood and affect  His behavior is normal  Thought content normal    His judgment is in very good in that he has been doing things that he should be doing because of his anticoagulation and disability  Nursing note and vitals reviewed

## 2018-03-09 ENCOUNTER — TELEPHONE (OUTPATIENT)
Dept: INTERNAL MEDICINE CLINIC | Facility: CLINIC | Age: 83
End: 2018-03-09

## 2018-03-12 ENCOUNTER — TELEPHONE (OUTPATIENT)
Dept: INTERNAL MEDICINE CLINIC | Facility: CLINIC | Age: 83
End: 2018-03-12

## 2018-03-12 NOTE — TELEPHONE ENCOUNTER
Anmoljaved Wade at [address] called to report pt  fell again at [facility] last night on all four's     EMT's examined him but he refused to go to the Er  He's not complaining of any increased pain since his last fall  Jeff Johnson His leg seemed a little stiff earlier but after seeing the Chiropractor pt  is feeling better

## 2018-03-13 ENCOUNTER — LAB (OUTPATIENT)
Dept: LAB | Facility: HOSPITAL | Age: 83
End: 2018-03-13
Payer: MEDICARE

## 2018-03-13 ENCOUNTER — TRANSCRIBE ORDERS (OUTPATIENT)
Dept: ADMINISTRATIVE | Facility: HOSPITAL | Age: 83
End: 2018-03-13

## 2018-03-13 ENCOUNTER — TELEPHONE (OUTPATIENT)
Dept: INTERNAL MEDICINE CLINIC | Facility: CLINIC | Age: 83
End: 2018-03-13

## 2018-03-13 DIAGNOSIS — D68.9 COAGULOPATHY (HCC): ICD-10-CM

## 2018-03-13 LAB
INR PPP: 1.57 (ref 0.86–1.16)
PROTHROMBIN TIME: 19.1 SECONDS (ref 12.1–14.4)

## 2018-03-13 PROCEDURE — 85610 PROTHROMBIN TIME: CPT

## 2018-03-13 PROCEDURE — 36415 COLL VENOUS BLD VENIPUNCTURE: CPT

## 2018-03-13 NOTE — TELEPHONE ENCOUNTER
Spoke w/ pt, he said he is doing fine    Said he landed on his belly when he fell and didn't have any injuries so he didn't feel the need to go to the hospital

## 2018-03-14 ENCOUNTER — TELEPHONE (OUTPATIENT)
Dept: INTERNAL MEDICINE CLINIC | Facility: CLINIC | Age: 83
End: 2018-03-14

## 2018-03-14 ENCOUNTER — ANTICOAG VISIT (OUTPATIENT)
Dept: INTERNAL MEDICINE CLINIC | Facility: CLINIC | Age: 83
End: 2018-03-14

## 2018-03-14 NOTE — TELEPHONE ENCOUNTER
spoke with pt about his inr per dr Aisha Pinedo he wants him to take 6/4/4/6 and repeat and recheck 03/20

## 2018-03-14 NOTE — PROGRESS NOTES
Spoke with pt he states he is taking 4mg daily per dr Sheila Ramirez he wants to take 6/4/4/6 and repeat recheck on Tuesday 03/20

## 2018-03-15 ENCOUNTER — TELEPHONE (OUTPATIENT)
Dept: INTERNAL MEDICINE CLINIC | Facility: CLINIC | Age: 83
End: 2018-03-15

## 2018-03-15 DIAGNOSIS — E13.9 DIABETES 1.5, MANAGED AS TYPE 1 (HCC): ICD-10-CM

## 2018-03-15 RX ORDER — GLYBURIDE 2.5 MG/1
2.5 TABLET ORAL DAILY
Qty: 90 TABLET | Refills: 3 | Status: SHIPPED | OUTPATIENT
Start: 2018-03-15 | End: 2018-04-19

## 2018-03-15 NOTE — TELEPHONE ENCOUNTER
Pt's son Joyce Herrera called  They want this medication sent to the [facility] pharmacy [facility]  They would also like a 90 day supply if possible  Call Joyce Herrera after medication changed

## 2018-03-18 DIAGNOSIS — I63.9 CEREBROVASCULAR ACCIDENT (CVA), UNSPECIFIED MECHANISM (HCC): Primary | ICD-10-CM

## 2018-03-19 RX ORDER — WARFARIN SODIUM 4 MG/1
TABLET ORAL
Qty: 30 TABLET | Refills: 1 | Status: SHIPPED | OUTPATIENT
Start: 2018-03-19 | End: 2018-03-28 | Stop reason: SDUPTHER

## 2018-03-28 ENCOUNTER — APPOINTMENT (OUTPATIENT)
Dept: LAB | Facility: CLINIC | Age: 83
End: 2018-03-28
Payer: MEDICARE

## 2018-03-28 ENCOUNTER — ANTICOAG VISIT (OUTPATIENT)
Dept: INTERNAL MEDICINE CLINIC | Facility: CLINIC | Age: 83
End: 2018-03-28

## 2018-03-28 ENCOUNTER — TELEPHONE (OUTPATIENT)
Dept: INTERNAL MEDICINE CLINIC | Facility: CLINIC | Age: 83
End: 2018-03-28

## 2018-03-28 DIAGNOSIS — I63.9 CEREBROVASCULAR ACCIDENT (CVA), UNSPECIFIED MECHANISM (HCC): ICD-10-CM

## 2018-03-28 DIAGNOSIS — I48.91 ATRIAL FIBRILLATION, UNSPECIFIED TYPE (HCC): ICD-10-CM

## 2018-03-28 LAB
INR PPP: 1.97 (ref 0.86–1.16)
PROTHROMBIN TIME: 22.6 SECONDS (ref 12.1–14.4)

## 2018-03-28 PROCEDURE — 85610 PROTHROMBIN TIME: CPT

## 2018-03-28 PROCEDURE — 36415 COLL VENOUS BLD VENIPUNCTURE: CPT

## 2018-03-28 RX ORDER — WARFARIN SODIUM 4 MG/1
4 TABLET ORAL DAILY
Qty: 30 TABLET | Refills: 0 | Status: SHIPPED | OUTPATIENT
Start: 2018-03-28 | End: 2018-04-23 | Stop reason: SDUPTHER

## 2018-03-28 RX ORDER — WARFARIN SODIUM 5 MG/1
TABLET ORAL
Qty: 1 TABLET | Refills: 0
Start: 2018-03-28 | End: 2018-04-19

## 2018-03-28 NOTE — TELEPHONE ENCOUNTER
----- Message from Eduardo Whitman DO sent at 3/28/2018  6:01 PM EDT -----  INR is 1 97    Same dose repeat 1 week

## 2018-03-30 ENCOUNTER — OFFICE VISIT (OUTPATIENT)
Dept: CARDIOLOGY CLINIC | Facility: CLINIC | Age: 83
End: 2018-03-30
Payer: MEDICARE

## 2018-03-30 VITALS
HEART RATE: 51 BPM | OXYGEN SATURATION: 95 % | BODY MASS INDEX: 30.35 KG/M2 | SYSTOLIC BLOOD PRESSURE: 138 MMHG | HEIGHT: 70 IN | DIASTOLIC BLOOD PRESSURE: 70 MMHG | WEIGHT: 212 LBS

## 2018-03-30 DIAGNOSIS — E78.2 MIXED HYPERLIPIDEMIA: ICD-10-CM

## 2018-03-30 DIAGNOSIS — Z79.01 WARFARIN ANTICOAGULATION: ICD-10-CM

## 2018-03-30 DIAGNOSIS — I10 ESSENTIAL HYPERTENSION: Primary | ICD-10-CM

## 2018-03-30 DIAGNOSIS — I25.10 CORONARY ARTERY DISEASE INVOLVING NATIVE CORONARY ARTERY OF NATIVE HEART WITHOUT ANGINA PECTORIS: ICD-10-CM

## 2018-03-30 PROBLEM — D68.9 COAGULOPATHY (HCC): Status: RESOLVED | Noted: 2017-11-24 | Resolved: 2018-03-30

## 2018-03-30 PROBLEM — D64.9 POSTOPERATIVE ANEMIA: Status: RESOLVED | Noted: 2017-11-24 | Resolved: 2018-03-30

## 2018-03-30 PROCEDURE — 99214 OFFICE O/P EST MOD 30 MIN: CPT | Performed by: INTERNAL MEDICINE

## 2018-03-30 RX ORDER — DOXAZOSIN MESYLATE 4 MG/1
4 TABLET ORAL EVERY MORNING
COMMUNITY
End: 2018-04-16 | Stop reason: SDUPTHER

## 2018-03-30 NOTE — PROGRESS NOTES
[facility] [place]  [address] [facility]  [id number]  Cardiology Follow Up    Jason You  1935  [id number]      1  Essential hypertension     2  Coronary artery disease involving native coronary artery of native heart without angina pectoris     3  Mixed hyperlipidemia     4  Warfarin anticoagulation         Chief Complaint   Patient presents with    Follow-up       Interval History: For follow-up visit  Patient had hip replacement surgery  Patient subsequently ended up having a stroke  Patient has been on anticoagulation with Coumadin since then  Patient seems to be slowly recovering from that  Patient is getting physical therapy  Patient denies any chest pain  No shortness of breath out of the ordinary  Patient does have mild aortic stenosis in the past   He states that he has been compliant with all his present medications  Patient also has back pain going into his legs which is thought to be secondary to back issues  Patient Active Problem List   Diagnosis    Stroke ([place] [address] )    HTN (hypertension)    DM2 (diabetes mellitus, type 2) ([place] [address] )    HLD (hyperlipidemia)    CAD (coronary artery disease)    Postoperative anemia    Right hip pain    Acute deep vein thrombosis (DVT) of brachial vein of left upper extremity (HCC)    At risk for polypharmacy    Family discord    Warfarin anticoagulation     Past Medical History:   Diagnosis Date    Acute ST elevation myocardial infarction [facility])     last assessed: 04/15/2016    Aortic valve disorder     Benign prostatic hyperplasia with lower urinary tract symptoms     CAD (coronary artery disease)     DM2 (diabetes mellitus, type 2) ([place] [address] )     History of colonoscopy     resolved: 05/08/2012    History of transfusion     History of transient cerebral ischemia     HLD (hyperlipidemia)     HTN (hypertension)     Neuralgia      Social History     Social History    Marital status:       Spouse name: N/A   Melva Gasca Number of children: N/A    Years of education: N/A     Occupational History    Not on file       Social History Main Topics    Smoking status: Never Smoker    Smokeless tobacco: Never Used    Alcohol use No    Drug use: No    Sexual activity: Not Currently     Partners: Female     Birth control/ protection: None     Other Topics Concern    Not on file     Social History Narrative    Active advance directive (as per [employer])     No advance directive on file (as per [employer])       Family History   Problem Relation Age of Onset    COPD Father      Past Surgical History:   Procedure Laterality Date    CARDIAC CATHETERIZATION      Outcome: successful; last assessed: 02/03/2015    CORONARY ANGIOPLASTY WITH STENT PLACEMENT  2008    stent to LAD     HAND SURGERY      thumb    HIP HARDWARE REMOVAL      TOTAL HIP ARTHROPLASTY Right     TOTAL HIP ARTHROPLASTY Bilateral        Current Outpatient Prescriptions:     amLODIPine (NORVASC) 10 mg tablet, Take 1 tablet by mouth daily, Disp: 1 tablet, Rfl: 0    aspirin 81 mg chewable tablet, Chew 4 tablets daily, Disp: 4 tablet, Rfl: 0    Blood Glucose Monitoring Suppl (ONE TOUCH ULTRA MINI) w/Device KIT, by Does not apply route, Disp: , Rfl:     Cyanocobalamin ER (RA VITAMIN B-12) 1000 MCG TBCR, Take 2 tablets by mouth daily, Disp: , Rfl:     doxazosin (CARDURA) 4 mg tablet, Take 4 mg by mouth daily at bedtime, Disp: , Rfl:     ergocalciferol (VITAMIN D2) 50,000 units, TAKE ONE CAPSULE BY MOUTH EVERY WEEK, Disp: 4 capsule, Rfl: 0    glucose blood (ONE TOUCH ULTRA TEST) test strip, by In Vitro route 2 (two) times a day, Disp: , Rfl:     glyBURIDE (DIABETA) 2 5 mg tablet, Take 1 tablet (2 5 mg total) by mouth daily, Disp: 90 tablet, Rfl: 3    lisinopril (ZESTRIL) 40 mg tablet, TAKE 1 TABLET BY MOUTH EVERY DAY, Disp: 30 tablet, Rfl: 0    metoprolol tartrate (LOPRESSOR) 100 mg tablet, TAKE 1 TABLET BY MOUTH TWICE A DAY (Patient taking differently: TAKE 0 5 TABLET BY MOUTH TWICE A DAY), Disp: 60 tablet, Rfl: 0    NEURONTIN 100 MG capsule, Take 1 capsule (100 mg total) by mouth 2 (two) times a day, Disp: 60 capsule, Rfl: 2    pyridoxine (RA VITAMIN B-6) 100 MG tablet, Take 1 tablet by mouth daily, Disp: , Rfl:     warfarin (COUMADIN) 4 mg tablet, Take 1 tablet (4 mg total) by mouth daily, Disp: 30 tablet, Rfl: 0    warfarin (COUMADIN) 5 mg tablet, 5 mg PO QPM starting 12/18/17, monitor INR, Disp: 1 tablet, Rfl: 0    oxybutynin (DITROPAN) 5 mg tablet, Take 1 tablet (5 mg total) by mouth daily, Disp: 90 tablet, Rfl: 3    traMADol (ULTRAM) 50 mg tablet, Take 1 tablet (50 mg total) by mouth every 6 (six) hours as needed for moderate pain, Disp: 30 tablet, Rfl: 0  Allergies   Allergen Reactions    Celecoxib     Hydromorphone     Pravastatin Hives    Statins        Labs:  Appointment on 03/28/2018   Component Date Value    Protime 03/28/2018 22 6*    INR 03/28/2018 1 97*   Lab on 03/13/2018   Component Date Value    Protime 03/13/2018 19 1*    INR 03/13/2018 1 57*   Lab Requisition on 02/26/2018   Component Date Value    Protime 02/26/2018 19 6*    INR 02/26/2018 1 62*   Anticoag visit on 02/23/2018   Component Date Value    INR 02/23/2018 4 00*   Lab Requisition on 02/16/2018   Component Date Value    Protime 02/16/2018 28 1*    INR 02/16/2018 2 54*   Appointment on 02/08/2018   Component Date Value    Protime 02/08/2018 22 3*    INR 02/08/2018 1 94*   Anticoag visit on 02/06/2018   Component Date Value    INR 02/01/2018 3 30*     Imaging: No results found      Review of Systems:  Review of Systems   REVIEW OF SYSTEMS:  Constitutional:  Denies fever or chills   Eyes:  Denies change in visual acuity   HENT:  Denies nasal congestion or sore throat   Respiratory:  Denies cough or shortness of breath   Cardiovascular:  Denies chest pain or edema   GI:  Denies abdominal pain, nausea, vomiting, bloody stools or diarrhea   :  Denies dysuria, frequency, difficulty in micturition and nocturia  Musculoskeletal:  Back pain  Neurologic: CVA  Endocrine:  Denies polyuria or polydipsia   Lymphatic:  Denies swollen glands   Psychiatric:  Denies depression or anxiety     Physical Exam:    /70   Pulse (!) 51   Ht 5' 10" (1 778 m)   Wt 96 2 kg (212 lb)   SpO2 95%   BMI 30 42 kg/m²     Physical Exam   PHYSICAL EXAM:  General:  Patient is not in acute distress   Head: Normocephalic, Atraumatic  HEENT:  Both pupils normal-size atraumatic, normocephalic, nonicteric  Neck:  JVP not raised  Trachea central  No carotid bruit  Respiratory:  normal breath sounds no crackles  no rhonchi  Cardiovascular:  Regular rate and rhythm no S3    2/6 systolic ejection murmur in the right sternal border  GI:  Abdomen soft nontender  No organomegaly  Lymphatic:  No cervical or inguinal lymphadenopathy  Neurologic:  Patient is awake alert, oriented   Grossly nonfocal  Extremities reveal trace to 1+ edema      Discussion/Summary:   Patient with multiple medical problems who seems to be doing reasonably well from cardiac standpoint  Previous studies reviewed with patient  Medications reviewed and possible side effects discussed  concepts of cardiovascular disease , signs and symptoms of heart disease  Dietary and risk factor modification reinforced  All questions answered  Safety measures reviewed  Patient advised to report any problems prompting medical attention  Patient understands the risks and benefits of anticoagulation to prevent thromboembolic risk  Target INR 2 to 3  Anticoagulation is followed by primary care physician  Importance of salt restriction reinforced  Events of recent hospitalization reviewed at length with patient and family  Patient and family had a few questions which were answered  All medications reviewed  Follow-up in 6 months or earlier as needed  Follow-up with primary care physician Neurology as well as Orthopedics

## 2018-04-03 ENCOUNTER — OFFICE VISIT (OUTPATIENT)
Dept: NEUROLOGY | Facility: CLINIC | Age: 83
End: 2018-04-03
Payer: MEDICARE

## 2018-04-03 VITALS
HEIGHT: 70 IN | BODY MASS INDEX: 30.21 KG/M2 | HEART RATE: 56 BPM | SYSTOLIC BLOOD PRESSURE: 130 MMHG | DIASTOLIC BLOOD PRESSURE: 60 MMHG | WEIGHT: 211 LBS

## 2018-04-03 DIAGNOSIS — I63.9 CEREBROVASCULAR ACCIDENT (CVA), UNSPECIFIED MECHANISM (HCC): ICD-10-CM

## 2018-04-03 PROCEDURE — 99213 OFFICE O/P EST LOW 20 MIN: CPT | Performed by: PSYCHIATRY & NEUROLOGY

## 2018-04-03 RX ORDER — ASPIRIN 325 MG
325 TABLET ORAL EVERY MORNING
COMMUNITY
End: 2019-04-01 | Stop reason: ALTCHOICE

## 2018-04-03 RX ORDER — MELATONIN
1000 DAILY
COMMUNITY

## 2018-04-03 NOTE — PROGRESS NOTES
Valarie Arguello is a [de-identified] y o  male here today as a referral from Dr Liz Fang to establish himself for care for a stroke suffered in November of 2017  He is accompanied by his son Ignacio Wall  Chief Complaint   Patient presents with   [facility] Stroke     Nov 2017       Assessment:  1  Cerebrovascular accident (CVA), unspecified mechanism ([address] )        Plan:    Discussion:  Discussed with patient regarding his anticoagulation, patient has been on Coumadin for his left upper extremity DVT that was started somewhere in December and it was mentioned that he would be on it for 6 months and then he would be transitioned to a combination of aspirin and Plavix, for his history of stroke, patient is currently doing well except he has slight right leg pain, he was advised to continue his physical therapy, his anticoagulation and antiplatelets is being monitored by his primary physician, stroke education was given, patient was advised to go to the hospital if has any worsening symptoms and call us otherwise to see me back in 4 months and follow up with his other physicians  Subjective:    HPI   Patient is here in follow-up accompanied with his son regarding his history of CVA, patient had tPA which was aborted in December and he has a right MCA inferior division severe stenosis and moderate to severe atherosclerotic disease with narrowing in the proximal to midportion of the basilar artery and 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, patient also has history of left upper extremity DVT for which he is on Coumadin, overall since the last few months he has been getting better, he denies any headaches, he ambulates with a crutch and complains of slight right leg pain otherwise he feels he is back to baseline      Vitals:    04/03/18 1408   BP: 130/60   BP Location: Left arm   Patient Position: Sitting   Cuff Size: Adult   Pulse: 56   Weight: 95 7 kg (211 lb)   Height: 5' 10" (1 778 m)       Current Medications    Current Outpatient Prescriptions:     amLODIPine (NORVASC) 10 mg tablet, Take 1 tablet by mouth daily, Disp: 1 tablet, Rfl: 0    aspirin 325 mg tablet, Take 325 mg by mouth every morning, Disp: , Rfl:     cholecalciferol (VITAMIN D3) 1,000 units tablet, Take 1,000 Units by mouth daily, Disp: , Rfl:     Cyanocobalamin ER (RA VITAMIN B-12) 1000 MCG TBCR, Take 2 tablets by mouth daily at bedtime  , Disp: , Rfl:     doxazosin (CARDURA) 4 mg tablet, Take 4 mg by mouth every morning  , Disp: , Rfl:     glucose blood (ONE TOUCH ULTRA TEST) test strip, by In Vitro route 2 (two) times a day, Disp: , Rfl:     glyBURIDE (DIABETA) 2 5 mg tablet, Take 1 tablet (2 5 mg total) by mouth daily, Disp: 90 tablet, Rfl: 3    lisinopril (ZESTRIL) 40 mg tablet, TAKE 1 TABLET BY MOUTH EVERY DAY, Disp: 30 tablet, Rfl: 0    metoprolol tartrate (LOPRESSOR) 100 mg tablet, TAKE 1 TABLET BY MOUTH TWICE A DAY (Patient taking differently: TAKE 0 5 TABLET BY MOUTH TWICE A DAY), Disp: 60 tablet, Rfl: 0    Mirabegron ER (MYRBETRIQ) 50 MG TB24, Take by mouth daily at bedtime, Disp: , Rfl:     NEURONTIN 100 MG capsule, Take 1 capsule (100 mg total) by mouth 2 (two) times a day, Disp: 60 capsule, Rfl: 2    pyridoxine (RA VITAMIN B-6) 100 MG tablet, Take 1 tablet by mouth daily, Disp: , Rfl:     warfarin (COUMADIN) 4 mg tablet, Take 1 tablet (4 mg total) by mouth daily, Disp: 30 tablet, Rfl: 0    aspirin 81 mg chewable tablet, Chew 4 tablets daily, Disp: 4 tablet, Rfl: 0    Blood Glucose Monitoring Suppl (ONE TOUCH ULTRA MINI) w/Device KIT, by Does not apply route, Disp: , Rfl:     ergocalciferol (VITAMIN D2) 50,000 units, TAKE ONE CAPSULE BY MOUTH EVERY WEEK, Disp: 4 capsule, Rfl: 0    oxybutynin (DITROPAN) 5 mg tablet, Take 1 tablet (5 mg total) by mouth daily, Disp: 90 tablet, Rfl: 3    traMADol (ULTRAM) 50 mg tablet, Take 1 tablet (50 mg total) by mouth every 6 (six) hours as needed for moderate pain, Disp: 30 tablet, Rfl: 0    warfarin (COUMADIN) 5 mg tablet, 5 mg PO QPM starting 12/18/17, monitor INR, Disp: 1 tablet, Rfl: 0      Allergies  Celecoxib; Hydromorphone; Pravastatin; and Statins    Past Medical History  Past Medical History:   Diagnosis Date    Acute ST elevation myocardial infarction [facility])     last assessed: 04/15/2016    Aortic valve disorder     Benign prostatic hyperplasia with lower urinary tract symptoms     CAD (coronary artery disease)     DM2 (diabetes mellitus, type 2) ([facility] [address] )     History of colonoscopy     resolved: 05/08/2012    History of transfusion     History of transient cerebral ischemia     HLD (hyperlipidemia)     HTN (hypertension)     Neuralgia          Past Surgical History:  Past Surgical History:   Procedure Laterality Date    CARDIAC CATHETERIZATION      Outcome: successful; last assessed: 02/03/2015    CORONARY ANGIOPLASTY WITH STENT PLACEMENT  2008    stent to LAD     HAND SURGERY      thumb    HIP HARDWARE REMOVAL      TOTAL HIP ARTHROPLASTY Right     TOTAL HIP ARTHROPLASTY Bilateral          Family History:  Family History   Problem Relation Age of Onset    Emphysema Father     Emphysema Sister        Social History:   reports that he has never smoked  He has never used smokeless tobacco  He reports that he does not drink alcohol or use drugs  I have reviewed the past medical history, surgical history, social and family history, current medications, allergies vitals, review of systems, and updated this information as appropriate today  Objective:    Physical Exam    Neurological Exam      GENERAL:  Cooperative in no acute distress  Well-developed and well-nourished    HEAD and NECK   Head is atraumatic normocephalic with no lesions or masses  Neck is supple with full range of motion    CARDIOVASCULAR  Carotid Arteries-no carotid bruits      NEUROLOGIC:  Mental Status-the patient is awake alert and oriented without aphasia or apraxia  Cranial Nerves: Visual fields are full to confrontation  Funduscopic examination could not be done Extraocular movements are full without nystagmus  Pupils are 2-1/2 mm and reactive  Face is symmetrical to light touch  Movements of facial expression move symmetrically  Hearing is normal to finger rub bilaterally  Soft palate lifts symmetrically  Shoulder shrug is symmetrical  Tongue is midline without atrophy  Motor: No drift is noted on arm extension  Strength is full in the upper and lower extremities with normal bulk and tone  With slight poor effort in the right lower ext  Sensory: Intact to temperature and vibratory sensation in the upper and lower extremities bilaterally  Cortical function is intact  Coordination: Finger to nose testing is performed accurately  Ambulates with a crutch  Reflexes:  Are symmetrical        ROS:  Review of Systems   Constitutional: Positive for fatigue  HENT: Negative for ear pain, facial swelling and trouble swallowing  Eyes: Negative for pain and visual disturbance  Respiratory: Negative for cough and shortness of breath  Cardiovascular: Negative for chest pain and leg swelling  Gastrointestinal: Negative for abdominal pain, constipation and diarrhea  Endocrine: Negative for polydipsia  Genitourinary: Positive for enuresis and frequency  Musculoskeletal: Positive for back pain and gait problem  Negative for neck pain  Skin: Negative  Neurological: Positive for tremors and speech difficulty  Negative for dizziness, seizures, weakness, light-headedness, numbness and headaches  Psychiatric/Behavioral: Positive for confusion  Negative for sleep disturbance

## 2018-04-09 ENCOUNTER — TELEPHONE (OUTPATIENT)
Dept: INTERNAL MEDICINE CLINIC | Facility: CLINIC | Age: 83
End: 2018-04-09

## 2018-04-09 NOTE — TELEPHONE ENCOUNTER
Advised patient that he was due for INR testing on 4/4/18  Pt said he know  I asked patient will he be in some time this week for INR and he said probably

## 2018-04-13 ENCOUNTER — TELEPHONE (OUTPATIENT)
Dept: INTERNAL MEDICINE CLINIC | Facility: CLINIC | Age: 83
End: 2018-04-13

## 2018-04-13 DIAGNOSIS — E13.9 DIABETES 1.5, MANAGED AS TYPE 1 (HCC): ICD-10-CM

## 2018-04-13 NOTE — TELEPHONE ENCOUNTER
Message was printed and given to Dr Anushka Whitaker advised me to call pt son to see if he had enough medication until Monday  Dr Anushka Whitaker said he will take care of this Monday  Pt son said yes pt does have enough meds until Monday

## 2018-04-13 NOTE — TELEPHONE ENCOUNTER
Refill request     Doxazosin 4mg   1 tab po every morning    Glyburide 2 5mg  1 tab po qd    Pharmacist mentioned that there are generic brands for glyburide, either glimepride or glipizide  Pt's son would like to know if they could switch to a generic since it is becoming very costly with all of his meds   He has a follow up scheduled for 4/19 but will be out of meds by then    Send to [facility]

## 2018-04-16 ENCOUNTER — TELEPHONE (OUTPATIENT)
Dept: INTERNAL MEDICINE CLINIC | Facility: CLINIC | Age: 83
End: 2018-04-16

## 2018-04-16 DIAGNOSIS — Z00.00 HEALTH CARE MAINTENANCE: ICD-10-CM

## 2018-04-16 DIAGNOSIS — Z00.00 HEALTH CARE MAINTENANCE: Primary | ICD-10-CM

## 2018-04-16 DIAGNOSIS — E11.8 TYPE 2 DIABETES MELLITUS WITH COMPLICATION, WITHOUT LONG-TERM CURRENT USE OF INSULIN (HCC): ICD-10-CM

## 2018-04-16 RX ORDER — DOXAZOSIN MESYLATE 4 MG/1
4 TABLET ORAL EVERY MORNING
Qty: 90 TABLET | Refills: 1 | Status: SHIPPED | OUTPATIENT
Start: 2018-04-16 | End: 2018-04-16 | Stop reason: SDUPTHER

## 2018-04-16 RX ORDER — GLIMEPIRIDE 2 MG/1
2 TABLET ORAL
Qty: 90 TABLET | Refills: 1 | Status: SHIPPED | OUTPATIENT
Start: 2018-04-16 | End: 2018-04-16 | Stop reason: SDUPTHER

## 2018-04-17 RX ORDER — GLIMEPIRIDE 2 MG/1
2 TABLET ORAL
Qty: 90 TABLET | Refills: 0 | Status: SHIPPED | OUTPATIENT
Start: 2018-04-17 | End: 2018-05-24 | Stop reason: SDUPTHER

## 2018-04-17 RX ORDER — DOXAZOSIN MESYLATE 4 MG/1
4 TABLET ORAL EVERY MORNING
Qty: 90 TABLET | Refills: 0 | Status: SHIPPED | OUTPATIENT
Start: 2018-04-17 | End: 2018-05-24 | Stop reason: SDUPTHER

## 2018-04-19 ENCOUNTER — OFFICE VISIT (OUTPATIENT)
Dept: INTERNAL MEDICINE CLINIC | Facility: CLINIC | Age: 83
End: 2018-04-19
Payer: MEDICARE

## 2018-04-19 VITALS
OXYGEN SATURATION: 97 % | SYSTOLIC BLOOD PRESSURE: 140 MMHG | HEIGHT: 70 IN | DIASTOLIC BLOOD PRESSURE: 68 MMHG | WEIGHT: 215 LBS | HEART RATE: 54 BPM | BODY MASS INDEX: 30.78 KG/M2

## 2018-04-19 DIAGNOSIS — E11.9 TYPE 2 DIABETES MELLITUS WITHOUT COMPLICATION, WITHOUT LONG-TERM CURRENT USE OF INSULIN (HCC): ICD-10-CM

## 2018-04-19 DIAGNOSIS — Z79.01 WARFARIN ANTICOAGULATION: ICD-10-CM

## 2018-04-19 DIAGNOSIS — I25.10 CORONARY ARTERY DISEASE INVOLVING NATIVE CORONARY ARTERY OF NATIVE HEART WITHOUT ANGINA PECTORIS: ICD-10-CM

## 2018-04-19 DIAGNOSIS — I63.9 CEREBROVASCULAR ACCIDENT (CVA), UNSPECIFIED MECHANISM (HCC): Primary | ICD-10-CM

## 2018-04-19 DIAGNOSIS — I10 ESSENTIAL HYPERTENSION: ICD-10-CM

## 2018-04-19 DIAGNOSIS — I82.622 ACUTE DEEP VEIN THROMBOSIS (DVT) OF BRACHIAL VEIN OF LEFT UPPER EXTREMITY (HCC): ICD-10-CM

## 2018-04-19 PROCEDURE — 99214 OFFICE O/P EST MOD 30 MIN: CPT | Performed by: INTERNAL MEDICINE

## 2018-04-19 NOTE — PROGRESS NOTES
Assessment/Plan:  Regarding his cardiac status he seems stable with no angina  He saw the cardiologist   It was recommended that he stay on his Coumadin for 6 months  Of time  He has had no bleeding and continues to monitor his INRs  He has ataxia  He is slow to respond  He does have periods of confusion  Blood sugars are said to be under good control on the new medicine but he must watch them closely and watch out for hypoglycemic reactions  Physical exam is basically unchanged  His son was in attendance  I emphasized that he must not drive and he must not use a chainsaw which he apparently was doing over the weekend  The importance of this was stressed to both he and his son  I will see him back in 2 months unless a need to see him before  He must continue monitoring his blood sugars closely and check blood work in 2 months at which time I will see him  No problem-specific Assessment & Plan notes found for this encounter  Diagnoses and all orders for this visit:    Cerebrovascular accident (CVA), unspecified mechanism ([facility] [address] )    Type 2 diabetes mellitus without complication, without long-term current use of insulin ([facility] )  -     Basic metabolic panel; Future  -     CBC and differential; Future  -     HEMOGLOBIN A1C W/ EAG ESTIMATION; Future    Essential hypertension    Coronary artery disease involving native coronary artery of native heart without angina pectoris  -     Lipid panel; Future    Acute deep vein thrombosis (DVT) of brachial vein of left upper extremity (HCC)    Warfarin anticoagulation          Subjective:  Problems are 1  Stroke 2  Anticoagulation 3  Ataxia 4  Diabetes 5  Hypertension 6  Coronary artery disease 7  Embolus to arm and leg     Patient ID: Herber Alexis is a [de-identified] y o  male  HPI he is in for checkup  We had a changes medicines because his insurance company would not pay for a generic medicine  He is now on glimepiride    She is taking 2 mg per day   Blood sugars are said to be good and he was cautioned in the strongest terms to watch out for low blood sugar not miss meals  He is going to continue monitoring his blood sugars though he did not bring a list with him today  The following portions of the patient's history were reviewed and updated as appropriate: allergies, current medications, past family history, past medical history, past social history, past surgical history and problem list     Review of Systems   Constitutional: Positive for fatigue  Negative for activity change, appetite change, chills, diaphoresis, fever and unexpected weight change  HENT: Negative for congestion, ear pain, hearing loss, mouth sores, nosebleeds, postnasal drip, sinus pain, sinus pressure, sore throat and trouble swallowing  Eyes: Negative for pain, discharge and visual disturbance  Respiratory: Negative for apnea, cough, chest tightness, shortness of breath and wheezing  Cardiovascular: Negative for chest pain, palpitations and leg swelling  Gastrointestinal: Negative for abdominal pain, anal bleeding, blood in stool, constipation, diarrhea, nausea and vomiting  Endocrine: Negative for polydipsia and polyphagia  Genitourinary: Negative for decreased urine volume, dysuria, flank pain, frequency, hematuria and urgency  Musculoskeletal: Positive for arthralgias and gait problem  Negative for back pain, joint swelling and myalgias  Skin: Negative for rash and wound  Allergic/Immunologic: Negative for environmental allergies and food allergies  Neurological: Negative for dizziness, tremors, seizures, syncope, speech difficulty, light-headedness, numbness and headaches  Some gait dysfunction  Memory is not good  Hematological: Negative for adenopathy  Does not bruise/bleed easily  Psychiatric/Behavioral: Negative for agitation, confusion, hallucinations, sleep disturbance and suicidal ideas  The patient is not nervous/anxious  Objective:     Physical Exam   Constitutional: He appears well-developed  No distress  Moderately overweight  HENT:   Head: Normocephalic  Right Ear: External ear normal    Left Ear: External ear normal    Nose: Nose normal    Mouth/Throat: Oropharynx is clear and moist  No oropharyngeal exudate  Eyes: Conjunctivae and EOM are normal  Pupils are equal, round, and reactive to light  Right eye exhibits no discharge  Left eye exhibits no discharge  Neck: Normal range of motion  Neck supple  No thyromegaly present  Cardiovascular: Normal rate, regular rhythm, normal heart sounds and intact distal pulses  Exam reveals no gallop and no friction rub  No murmur heard  Pulmonary/Chest: Effort normal and breath sounds normal  No respiratory distress  He has no wheezes  He has no rales  Abdominal: Soft  Bowel sounds are normal  He exhibits no distension and no mass  There is no tenderness  There is no rebound and no guarding  Musculoskeletal: Normal range of motion  He exhibits no edema, tenderness or deformity  Lymphadenopathy:     He has no cervical adenopathy  Neurological: He is alert  He has normal reflexes  No cranial nerve deficit  Coordination normal    Skin: Skin is warm and dry  No rash noted  No erythema  Psychiatric: He has a normal mood and affect  His behavior is normal  Judgment and thought content normal    Nursing note and vitals reviewed

## 2018-04-23 DIAGNOSIS — I63.9 CEREBROVASCULAR ACCIDENT (CVA), UNSPECIFIED MECHANISM (HCC): ICD-10-CM

## 2018-04-23 DIAGNOSIS — G89.4 CHRONIC PAIN SYNDROME: ICD-10-CM

## 2018-04-23 RX ORDER — WARFARIN SODIUM 4 MG/1
4 TABLET ORAL DAILY
Qty: 90 TABLET | Refills: 3 | Status: SHIPPED | OUTPATIENT
Start: 2018-04-23 | End: 2018-06-14

## 2018-04-24 DIAGNOSIS — I10 ESSENTIAL HYPERTENSION: ICD-10-CM

## 2018-04-24 RX ORDER — LISINOPRIL 40 MG/1
TABLET ORAL
Qty: 30 TABLET | Refills: 0 | Status: SHIPPED | OUTPATIENT
Start: 2018-04-24 | End: 2018-05-24 | Stop reason: SDUPTHER

## 2018-04-24 RX ORDER — GABAPENTIN 100 MG/1
CAPSULE ORAL
Qty: 60 CAPSULE | Refills: 0 | Status: SHIPPED | OUTPATIENT
Start: 2018-04-24 | End: 2018-05-24 | Stop reason: SDUPTHER

## 2018-04-26 ENCOUNTER — TELEPHONE (OUTPATIENT)
Dept: INTERNAL MEDICINE CLINIC | Facility: CLINIC | Age: 83
End: 2018-04-26

## 2018-04-26 NOTE — TELEPHONE ENCOUNTER
Pt's caregiver Chuck Silva called in regards to his PT/INR  he is having them done weekly  Pt stated that he has a hard time getting out of the house and having them done at a lab  Wants to know if we could order a home PT/INR machine  Chuck Silva already went over instructions with the pt and he thinks this would be best for him  Please advise      Call Chuck Silva  [phone]

## 2018-04-27 DIAGNOSIS — E11.8 TYPE 2 DIABETES MELLITUS WITH COMPLICATION, WITHOUT LONG-TERM CURRENT USE OF INSULIN (HCC): Primary | ICD-10-CM

## 2018-04-27 NOTE — TELEPHONE ENCOUNTER
Consumption he please check about how we go about ordering this  I am not sure    It may be you have to talk to Cardiology

## 2018-04-27 NOTE — TELEPHONE ENCOUNTER
Filled out alere form and gave to michelle to give to dr Sandra Montano for signature    I will fax form when complete

## 2018-04-30 ENCOUNTER — TELEPHONE (OUTPATIENT)
Dept: DERMATOLOGY | Facility: CLINIC | Age: 83
End: 2018-04-30

## 2018-04-30 NOTE — TELEPHONE ENCOUNTER
Pt's son- Jigna Gutiérrez called again about the test strips  The patient only has enough left for [facility]       [phone]--- Please call Jigna Gutiérrez when we put order into [facility]

## 2018-04-30 NOTE — TELEPHONE ENCOUNTER
Answering service 4/27/2018 6:29 pm    Pt needs new RX for diabetic test strips    He uses [address] in [facility]

## 2018-05-01 DIAGNOSIS — E11.8 TYPE 2 DIABETES MELLITUS WITH COMPLICATION, WITHOUT LONG-TERM CURRENT USE OF INSULIN (HCC): ICD-10-CM

## 2018-05-01 NOTE — TELEPHONE ENCOUNTER
Pts son cld asking when the strips will be ordered, The pt is now out of them  Please call son, Magui Waite, [phone]

## 2018-05-02 ENCOUNTER — TELEPHONE (OUTPATIENT)
Dept: INTERNAL MEDICINE CLINIC | Facility: CLINIC | Age: 83
End: 2018-05-02

## 2018-05-02 DIAGNOSIS — E11.8 TYPE 2 DIABETES MELLITUS WITH COMPLICATION, WITHOUT LONG-TERM CURRENT USE OF INSULIN (HCC): ICD-10-CM

## 2018-05-02 NOTE — TELEPHONE ENCOUNTER
Pts son called stating he needs the test strips sent to [facility] by today, his dad is out of them and they are leaving town today

## 2018-05-07 DIAGNOSIS — I10 ESSENTIAL HYPERTENSION: ICD-10-CM

## 2018-05-07 NOTE — TELEPHONE ENCOUNTER
Patient's son call for a refill of Lopressor   Script can be called in to [facility] [address]

## 2018-05-08 RX ORDER — METOPROLOL TARTRATE 100 MG/1
100 TABLET ORAL 2 TIMES DAILY
Qty: 60 TABLET | Refills: 0 | OUTPATIENT
Start: 2018-05-08

## 2018-05-08 NOTE — TELEPHONE ENCOUNTER
There is confusion several times with this medicine  1 note says he is taking half a tablet twice a day, another note says he is taking a full tablet twice a day  We have to make certain about this with his son not him because he will no    Then write it out with multiple refills

## 2018-05-08 NOTE — TELEPHONE ENCOUNTER
It is unclear whether the patient is taking half a tablet twice a day or 1 tablet twice a day  This has to be clarified

## 2018-05-09 DIAGNOSIS — I10 ESSENTIAL HYPERTENSION: ICD-10-CM

## 2018-05-09 RX ORDER — METOPROLOL TARTRATE 100 MG/1
TABLET ORAL
Qty: 180 TABLET | Refills: 3 | Status: SHIPPED | OUTPATIENT
Start: 2018-05-09 | End: 2018-06-04 | Stop reason: SDUPTHER

## 2018-05-09 NOTE — TELEPHONE ENCOUNTER
PT  SON  CALLED  BACK  SAID  DR GONZALES CHANGE  IT  TO  1/2 PILL  TWICE  A  DAY    [facility] IS  OUT  OF  PILLS  ANY QUESTIONS  CALL  [phone]

## 2018-05-16 ENCOUNTER — TELEPHONE (OUTPATIENT)
Dept: INTERNAL MEDICINE CLINIC | Facility: CLINIC | Age: 83
End: 2018-05-16

## 2018-05-17 ENCOUNTER — TELEPHONE (OUTPATIENT)
Dept: INTERNAL MEDICINE CLINIC | Facility: CLINIC | Age: 83
End: 2018-05-17

## 2018-05-21 ENCOUNTER — TELEPHONE (OUTPATIENT)
Dept: INTERNAL MEDICINE CLINIC | Facility: CLINIC | Age: 83
End: 2018-05-21

## 2018-05-21 NOTE — TELEPHONE ENCOUNTER
As per the notes the patient is to take Coumadin for 6 months from that time he initially had the clot to his arm  At 6 months he is going to discontinue Coumadin and start Plavix and aspirin

## 2018-05-21 NOTE — TELEPHONE ENCOUNTER
Pts son cld stating the pt is supposed to have [address] come in and show him how to use a machine to check his INR  Pts son, Becki Payton, states he needs a 6 month commitment of being on WARFARIN  Becki Payton states Dr Pollo Olivas told pt he was coming off in 2 months  I saw in last visit note that he was to continue for 6 months but Becki Payton says that's starting from January   Please call Becki Payton and advise to what is going on  [phone]

## 2018-05-22 NOTE — TELEPHONE ENCOUNTER
Anne-Marie Ovalles /  Son said his father rec'ed a call this morning from here and he's not sure of what he was told    So Anne-Marie Ovalles is asking that he get a call back w/info from this mornings call to his father    Also said to call him all the time w/any instructions or messages,  because his father is easily confused

## 2018-05-23 NOTE — TELEPHONE ENCOUNTER
Wilburt Myron called back and was notified and also given the last message from dr Ashley Isabel again, says 6 mos will be up in June and he does have an apt on 6/26    Also said [facility] is going to be faxing something for a med but he wasn't sure which med

## 2018-05-23 NOTE — TELEPHONE ENCOUNTER
Spoke w/ michelle, she did not call pt for anything    Forwarding to dr Lucy Handley, did you call pt?

## 2018-05-24 DIAGNOSIS — E11.8 TYPE 2 DIABETES MELLITUS WITH COMPLICATION, WITHOUT LONG-TERM CURRENT USE OF INSULIN (HCC): ICD-10-CM

## 2018-05-24 DIAGNOSIS — Z00.00 HEALTH CARE MAINTENANCE: ICD-10-CM

## 2018-05-24 DIAGNOSIS — I10 ESSENTIAL HYPERTENSION: ICD-10-CM

## 2018-05-24 DIAGNOSIS — G89.4 CHRONIC PAIN SYNDROME: ICD-10-CM

## 2018-05-24 DIAGNOSIS — I15.9 SECONDARY HYPERTENSION: Primary | ICD-10-CM

## 2018-05-24 RX ORDER — GLIMEPIRIDE 2 MG/1
2 TABLET ORAL
Qty: 90 TABLET | Refills: 3 | Status: SHIPPED | OUTPATIENT
Start: 2018-05-24 | End: 2018-06-04 | Stop reason: SDUPTHER

## 2018-05-24 RX ORDER — GABAPENTIN 100 MG/1
100 CAPSULE ORAL 2 TIMES DAILY
Qty: 60 CAPSULE | Refills: 0 | Status: SHIPPED | OUTPATIENT
Start: 2018-05-24 | End: 2018-06-29 | Stop reason: SDUPTHER

## 2018-05-24 RX ORDER — GABAPENTIN 100 MG/1
CAPSULE ORAL
Qty: 180 CAPSULE | Refills: 3 | OUTPATIENT
Start: 2018-05-24

## 2018-05-24 RX ORDER — LISINOPRIL 40 MG/1
TABLET ORAL
Qty: 90 TABLET | Refills: 3 | OUTPATIENT
Start: 2018-05-24

## 2018-05-24 RX ORDER — GLIMEPIRIDE 2 MG/1
2 TABLET ORAL
Qty: 90 TABLET | Refills: 3 | OUTPATIENT
Start: 2018-05-24

## 2018-05-24 RX ORDER — DOXAZOSIN MESYLATE 4 MG/1
4 TABLET ORAL EVERY MORNING
Qty: 90 TABLET | Refills: 3 | OUTPATIENT
Start: 2018-05-24

## 2018-05-24 RX ORDER — DOXAZOSIN MESYLATE 4 MG/1
4 TABLET ORAL EVERY MORNING
Qty: 90 TABLET | Refills: 3 | Status: SHIPPED | OUTPATIENT
Start: 2018-05-24 | End: 2018-06-04 | Stop reason: SDUPTHER

## 2018-05-24 RX ORDER — AMLODIPINE BESYLATE 10 MG/1
10 TABLET ORAL DAILY
Qty: 90 TABLET | Refills: 3
Start: 2018-05-24 | End: 2018-06-04 | Stop reason: SDUPTHER

## 2018-05-24 RX ORDER — LISINOPRIL 40 MG/1
40 TABLET ORAL DAILY
Qty: 90 TABLET | Refills: 3 | Status: SHIPPED | OUTPATIENT
Start: 2018-05-24 | End: 2018-12-04 | Stop reason: SDUPTHER

## 2018-05-24 RX ORDER — AMLODIPINE BESYLATE 10 MG/1
10 TABLET ORAL DAILY
Qty: 90 TABLET | Refills: 3
Start: 2018-05-24

## 2018-06-04 DIAGNOSIS — I10 ESSENTIAL HYPERTENSION: ICD-10-CM

## 2018-06-04 DIAGNOSIS — E11.8 TYPE 2 DIABETES MELLITUS WITH COMPLICATION, WITHOUT LONG-TERM CURRENT USE OF INSULIN (HCC): ICD-10-CM

## 2018-06-04 DIAGNOSIS — Z00.00 HEALTH CARE MAINTENANCE: ICD-10-CM

## 2018-06-04 RX ORDER — AMLODIPINE BESYLATE 10 MG/1
10 TABLET ORAL DAILY
Qty: 90 TABLET | Refills: 0
Start: 2018-06-04 | End: 2018-06-14 | Stop reason: SDUPTHER

## 2018-06-04 RX ORDER — DOXAZOSIN MESYLATE 4 MG/1
4 TABLET ORAL EVERY MORNING
Qty: 90 TABLET | Refills: 0 | Status: SHIPPED | OUTPATIENT
Start: 2018-06-04 | End: 2018-06-14 | Stop reason: SDUPTHER

## 2018-06-04 RX ORDER — GLIMEPIRIDE 2 MG/1
2 TABLET ORAL
Qty: 90 TABLET | Refills: 0 | Status: SHIPPED | OUTPATIENT
Start: 2018-06-04 | End: 2018-06-14 | Stop reason: SDUPTHER

## 2018-06-04 RX ORDER — METOPROLOL TARTRATE 100 MG/1
TABLET ORAL
Qty: 90 TABLET | Refills: 0 | Status: SHIPPED | OUTPATIENT
Start: 2018-06-04 | End: 2018-06-14 | Stop reason: SDUPTHER

## 2018-06-04 NOTE — TELEPHONE ENCOUNTER
She is following up on status of this patient    She asked to spk to Radhames Hodge    She didn't want to leave info    She said it's a lot of info and wanted to have Karen call her back

## 2018-06-04 NOTE — TELEPHONE ENCOUNTER
Son has been trying to get fathers scripts sent to [facility]   Son has received two of the medications but has not received the following meds[de-identified]    Doxazosin 4 mg tab-take 1 tablet by mouth every morning  Glimepiride 2 mg tab-take 1 tablet by mouth daily with breakfast  Metoprolol tartrate 100 mg-take half a tab twice daily  Amlodipine 10 mg tab-take 1 tab by mouth daily    Please send the above scripts to: [facility]

## 2018-06-12 ENCOUNTER — LAB (OUTPATIENT)
Dept: LAB | Facility: CLINIC | Age: 83
End: 2018-06-12
Payer: MEDICARE

## 2018-06-12 DIAGNOSIS — N18.30 CHRONIC KIDNEY DISEASE, STAGE III (MODERATE) (HCC): ICD-10-CM

## 2018-06-12 DIAGNOSIS — I12.9 HYPERTENSIVE CHRONIC KIDNEY DISEASE WITH STAGE 1 THROUGH STAGE 4 CHRONIC KIDNEY DISEASE, OR UNSPECIFIED CHRONIC KIDNEY DISEASE: ICD-10-CM

## 2018-06-12 DIAGNOSIS — L98.9 DISORDER OF SKIN OR SUBCUTANEOUS TISSUE: ICD-10-CM

## 2018-06-12 DIAGNOSIS — I25.10 CORONARY ARTERY DISEASE INVOLVING NATIVE CORONARY ARTERY OF NATIVE HEART WITHOUT ANGINA PECTORIS: ICD-10-CM

## 2018-06-12 DIAGNOSIS — E11.9 TYPE 2 DIABETES MELLITUS WITHOUT COMPLICATION, WITHOUT LONG-TERM CURRENT USE OF INSULIN (HCC): ICD-10-CM

## 2018-06-12 DIAGNOSIS — E11.9 TYPE 2 DIABETES MELLITUS WITHOUT COMPLICATIONS (HCC): ICD-10-CM

## 2018-06-12 DIAGNOSIS — I25.10 ATHEROSCLEROTIC HEART DISEASE OF NATIVE CORONARY ARTERY WITHOUT ANGINA PECTORIS: ICD-10-CM

## 2018-06-12 LAB
ANION GAP SERPL CALCULATED.3IONS-SCNC: 4 MMOL/L (ref 4–13)
BASOPHILS # BLD AUTO: 0.04 THOUSANDS/ΜL (ref 0–0.1)
BASOPHILS NFR BLD AUTO: 1 % (ref 0–1)
BUN SERPL-MCNC: 25 MG/DL (ref 5–25)
CALCIUM SERPL-MCNC: 8.6 MG/DL (ref 8.3–10.1)
CHLORIDE SERPL-SCNC: 105 MMOL/L (ref 100–108)
CHOLEST SERPL-MCNC: 166 MG/DL (ref 50–200)
CO2 SERPL-SCNC: 30 MMOL/L (ref 21–32)
CREAT SERPL-MCNC: 1.57 MG/DL (ref 0.6–1.3)
EOSINOPHIL # BLD AUTO: 0.34 THOUSAND/ΜL (ref 0–0.61)
EOSINOPHIL NFR BLD AUTO: 6 % (ref 0–6)
ERYTHROCYTE [DISTWIDTH] IN BLOOD BY AUTOMATED COUNT: 13.3 % (ref 11.6–15.1)
EST. AVERAGE GLUCOSE BLD GHB EST-MCNC: 140 MG/DL
GFR SERPL CREATININE-BSD FRML MDRD: 41 ML/MIN/1.73SQ M
GLUCOSE P FAST SERPL-MCNC: 107 MG/DL (ref 65–99)
HBA1C MFR BLD: 6.5 % (ref 4.2–6.3)
HCT VFR BLD AUTO: 40.2 % (ref 36.5–49.3)
HDLC SERPL-MCNC: 46 MG/DL (ref 40–60)
HGB BLD-MCNC: 12.8 G/DL (ref 12–17)
IMM GRANULOCYTES # BLD AUTO: 0.01 THOUSAND/UL (ref 0–0.2)
IMM GRANULOCYTES NFR BLD AUTO: 0 % (ref 0–2)
LDLC SERPL CALC-MCNC: 103 MG/DL (ref 0–100)
LYMPHOCYTES # BLD AUTO: 2.14 THOUSANDS/ΜL (ref 0.6–4.47)
LYMPHOCYTES NFR BLD AUTO: 37 % (ref 14–44)
MCH RBC QN AUTO: 31.3 PG (ref 26.8–34.3)
MCHC RBC AUTO-ENTMCNC: 31.8 G/DL (ref 31.4–37.4)
MCV RBC AUTO: 98 FL (ref 82–98)
MONOCYTES # BLD AUTO: 0.48 THOUSAND/ΜL (ref 0.17–1.22)
MONOCYTES NFR BLD AUTO: 8 % (ref 4–12)
NEUTROPHILS # BLD AUTO: 2.8 THOUSANDS/ΜL (ref 1.85–7.62)
NEUTS SEG NFR BLD AUTO: 48 % (ref 43–75)
NONHDLC SERPL-MCNC: 120 MG/DL
NRBC BLD AUTO-RTO: 0 /100 WBCS
PLATELET # BLD AUTO: 189 THOUSANDS/UL (ref 149–390)
PMV BLD AUTO: 10 FL (ref 8.9–12.7)
POTASSIUM SERPL-SCNC: 4.2 MMOL/L (ref 3.5–5.3)
RBC # BLD AUTO: 4.09 MILLION/UL (ref 3.88–5.62)
SODIUM SERPL-SCNC: 139 MMOL/L (ref 136–145)
TRIGL SERPL-MCNC: 84 MG/DL
WBC # BLD AUTO: 5.81 THOUSAND/UL (ref 4.31–10.16)

## 2018-06-12 PROCEDURE — 85025 COMPLETE CBC W/AUTO DIFF WBC: CPT

## 2018-06-12 PROCEDURE — 80061 LIPID PANEL: CPT

## 2018-06-12 PROCEDURE — 80048 BASIC METABOLIC PNL TOTAL CA: CPT

## 2018-06-12 PROCEDURE — 83036 HEMOGLOBIN GLYCOSYLATED A1C: CPT

## 2018-06-12 PROCEDURE — 36415 COLL VENOUS BLD VENIPUNCTURE: CPT

## 2018-06-14 ENCOUNTER — OFFICE VISIT (OUTPATIENT)
Dept: INTERNAL MEDICINE CLINIC | Facility: CLINIC | Age: 83
End: 2018-06-14
Payer: MEDICARE

## 2018-06-14 VITALS
HEIGHT: 70 IN | DIASTOLIC BLOOD PRESSURE: 62 MMHG | HEART RATE: 58 BPM | BODY MASS INDEX: 30.98 KG/M2 | WEIGHT: 216.4 LBS | SYSTOLIC BLOOD PRESSURE: 160 MMHG

## 2018-06-14 DIAGNOSIS — E11.8 TYPE 2 DIABETES MELLITUS WITH COMPLICATION, WITHOUT LONG-TERM CURRENT USE OF INSULIN (HCC): ICD-10-CM

## 2018-06-14 DIAGNOSIS — I63.9 CEREBROVASCULAR ACCIDENT (CVA), UNSPECIFIED MECHANISM (HCC): Primary | ICD-10-CM

## 2018-06-14 DIAGNOSIS — I10 ESSENTIAL HYPERTENSION: ICD-10-CM

## 2018-06-14 DIAGNOSIS — Z00.00 HEALTH CARE MAINTENANCE: ICD-10-CM

## 2018-06-14 PROCEDURE — 99214 OFFICE O/P EST MOD 30 MIN: CPT | Performed by: INTERNAL MEDICINE

## 2018-06-14 RX ORDER — AMLODIPINE BESYLATE 10 MG/1
10 TABLET ORAL DAILY
Qty: 90 TABLET | Refills: 3 | Status: SHIPPED | OUTPATIENT
Start: 2018-06-14 | End: 2018-12-04 | Stop reason: SDUPTHER

## 2018-06-14 RX ORDER — METOPROLOL TARTRATE 100 MG/1
TABLET ORAL
Qty: 90 TABLET | Refills: 3 | Status: SHIPPED | OUTPATIENT
Start: 2018-06-14 | End: 2019-07-19 | Stop reason: SDUPTHER

## 2018-06-14 RX ORDER — DOXAZOSIN MESYLATE 4 MG/1
4 TABLET ORAL EVERY MORNING
Qty: 90 TABLET | Refills: 3 | Status: SHIPPED | OUTPATIENT
Start: 2018-06-14 | End: 2019-05-03 | Stop reason: SDUPTHER

## 2018-06-14 RX ORDER — GLIMEPIRIDE 2 MG/1
2 TABLET ORAL
Qty: 90 TABLET | Refills: 3 | Status: SHIPPED | OUTPATIENT
Start: 2018-06-14 | End: 2019-05-03 | Stop reason: SDUPTHER

## 2018-06-14 RX ORDER — CLOPIDOGREL BISULFATE 75 MG/1
75 TABLET ORAL DAILY
Qty: 90 TABLET | Refills: 2 | Status: SHIPPED | OUTPATIENT
Start: 2018-06-14 | End: 2018-12-04 | Stop reason: SDUPTHER

## 2018-06-14 NOTE — PROGRESS NOTES
Assessment/Plan:  Regarding cardiac status he seems stable with no chest pains or tightness  Blood pressure is stable  Regarding his emboli and thrombus he needs to discontinue his Coumadin  I explained this to both he and his son  He will go back on Plavix 75 mg per day and continue aspirin  His blood sugars are under good control with an A1c of 6 5 with no hypoglycemia  He has about driving and I told him he cannot drive at this point  He is not quick enough  He was not happy about that  His son seems to understand      Recent Results (from the past 1008 hour(s))   Basic metabolic panel    Collection Time: 06/12/18  7:51 AM   Result Value Ref Range    Sodium 139 136 - 145 mmol/L    Potassium 4 2 3 5 - 5 3 mmol/L    Chloride 105 100 - 108 mmol/L    CO2 30 21 - 32 mmol/L    Anion Gap 4 4 - 13 mmol/L    BUN 25 5 - 25 mg/dL    Creatinine 1 57 (H) 0 60 - 1 30 mg/dL    Glucose, Fasting 107 (H) 65 - 99 mg/dL    Calcium 8 6 8 3 - 10 1 mg/dL    eGFR 41 ml/min/1 73sq m   CBC and differential    Collection Time: 06/12/18  7:51 AM   Result Value Ref Range    WBC 5 81 4 31 - 10 16 Thousand/uL    RBC 4 09 3 88 - 5 62 Million/uL    Hemoglobin 12 8 12 0 - 17 0 g/dL    Hematocrit 40 2 36 5 - 49 3 %    MCV 98 82 - 98 fL    MCH 31 3 26 8 - 34 3 pg    MCHC 31 8 31 4 - 37 4 g/dL    RDW 13 3 11 6 - 15 1 %    MPV 10 0 8 9 - 12 7 fL    Platelets 137 400 - 688 Thousands/uL    nRBC 0 /100 WBCs    Neutrophils Relative 48 43 - 75 %    Immat GRANS % 0 0 - 2 %    Lymphocytes Relative 37 14 - 44 %    Monocytes Relative 8 4 - 12 %    Eosinophils Relative 6 0 - 6 %    Basophils Relative 1 0 - 1 %    Neutrophils Absolute 2 80 1 85 - 7 62 Thousands/µL    Immature Grans Absolute 0 01 0 00 - 0 20 Thousand/uL    Lymphocytes Absolute 2 14 0 60 - 4 47 Thousands/µL    Monocytes Absolute 0 48 0 17 - 1 22 Thousand/µL    Eosinophils Absolute 0 34 0 00 - 0 61 Thousand/µL    Basophils Absolute 0 04 0 00 - 0 10 Thousands/µL   Hemoglobin A1c Collection Time: 06/12/18  7:51 AM   Result Value Ref Range    Hemoglobin A1C 6 5 (H) 4 2 - 6 3 %     mg/dl   Lipid panel    Collection Time: 06/12/18  7:51 AM   Result Value Ref Range    Cholesterol 166 50 - 200 mg/dL    Triglycerides 84 <=150 mg/dL    HDL, Direct 46 40 - 60 mg/dL    LDL Calculated 103 (H) 0 - 100 mg/dL    Non-HDL-Chol (CHOL-HDL) 120 mg/dl       1  Cerebrovascular accident (CVA), unspecified mechanism ([facility] )  clopidogrel (PLAVIX) 75 mg tablet   2  Essential hypertension  metoprolol tartrate (LOPRESSOR) 100 mg tablet    amLODIPine (NORVASC) 10 mg tablet   3  Type 2 diabetes mellitus with complication, without long-term current use of insulin ([facility])  glimepiride (AMARYL) 2 mg tablet   4  Health care maintenance  doxazosin (CARDURA) 4 mg tablet       No orders of the defined types were placed in this encounter  Subjective:  Problems are 1  Status post CVA 2   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 leg 3  Hypertension 4  Diabetes 5  Hyperlipidemia 6  Anticoagulation  Patient ID: Talitha Burkitt is a [de-identified] y o  male  HPI he comes in for follow-up  He is actually doing fairly well  He has not had any falls  He brought in a list of blood sugars under reasonably stable  He is now on glimepiride 2 mg per day  A1c was 6 5  He has finished 6 months of anticoagulation  He is going to stop his Coumadin  The following portions of the patient's history were reviewed and updated as appropriate:   He has a past medical history of Acute ST elevation myocardial infarction [facility]); Aortic valve disorder; Benign prostatic hyperplasia with lower urinary tract symptoms; CAD (coronary artery disease); DM2 (diabetes mellitus, type 2) ([place] [id number] ); History of colonoscopy; History of transfusion; History of transient cerebral ischemia; HLD (hyperlipidemia); HTN (hypertension); and Neuralgia ,   does not have any pertinent problems on file  ,   has a past surgical history that includes Total hip arthroplasty (Right); Hip hardware removal; Cardiac catheterization; Coronary angioplasty with stent (2008); Hand surgery; and Total hip arthroplasty (Bilateral)  ,  family history includes Emphysema in his father and sister  ,   reports that he has never smoked  He has never used smokeless tobacco  He reports that he does not drink alcohol or use drugs  ,  is allergic to celecoxib; hydromorphone; pravastatin; and statins       Current Outpatient Prescriptions:     amLODIPine (NORVASC) 10 mg tablet, Take 1 tablet (10 mg total) by mouth daily, Disp: 90 tablet, Rfl: 3    aspirin 325 mg tablet, Take 325 mg by mouth every morning, Disp: , Rfl:     Blood Glucose Monitoring Suppl (ONE TOUCH ULTRA MINI) w/Device KIT, by Does not apply route, Disp: , Rfl:     cholecalciferol (VITAMIN D3) 1,000 units tablet, Take 1,000 Units by mouth daily, Disp: , Rfl:     Cyanocobalamin ER (RA VITAMIN B-12) 1000 MCG TBCR, Take 2 tablets by mouth daily at bedtime  , Disp: , Rfl:     doxazosin (CARDURA) 4 mg tablet, Take 1 tablet (4 mg total) by mouth every morning, Disp: 90 tablet, Rfl: 3    gabapentin (NEURONTIN) 100 mg capsule, Take 1 capsule (100 mg total) by mouth 2 (two) times a day, Disp: 60 capsule, Rfl: 0    glimepiride (AMARYL) 2 mg tablet, Take 1 tablet (2 mg total) by mouth daily with breakfast, Disp: 90 tablet, Rfl: 3    glucose blood (ONE TOUCH ULTRA TEST) test strip, 1 each by Other route 2 (two) times a day Use as instructed, Disp: 100 each, Rfl: 5    lisinopril (ZESTRIL) 40 mg tablet, Take 1 tablet (40 mg total) by mouth daily, Disp: 90 tablet, Rfl: 3    metoprolol tartrate (LOPRESSOR) 100 mg tablet, Take half a tab twice daily, Disp: 90 tablet, Rfl: 3    pyridoxine (RA VITAMIN B-6) 100 MG tablet, Take 1 tablet by mouth daily, Disp: , Rfl:     clopidogrel (PLAVIX) 75 mg tablet, Take 1 tablet (75 mg total) by mouth daily, Disp: 90 tablet, Rfl: 2    Review of Systems   Constitutional: Positive for fatigue   Negative for activity change, appetite change, chills, diaphoresis, fever and unexpected weight change  HENT: Negative for congestion, ear pain, hearing loss, mouth sores, nosebleeds, postnasal drip, sinus pain, sinus pressure, sore throat and trouble swallowing  Eyes: Negative for pain, discharge and visual disturbance  Respiratory: Negative for apnea, cough, chest tightness, shortness of breath and wheezing  He denies any chest pain pressure squeezing or tightness  Cardiovascular: Negative for chest pain, palpitations and leg swelling  Gastrointestinal: Negative for abdominal pain, anal bleeding, blood in stool, constipation, diarrhea, nausea and vomiting  Endocrine: Negative for polydipsia and polyphagia  Genitourinary: Negative for decreased urine volume, dysuria, flank pain, frequency, hematuria and urgency  Musculoskeletal: Negative for arthralgias, back pain, gait problem, joint swelling and myalgias  Skin: Negative for rash and wound  Allergic/Immunologic: Negative for environmental allergies and food allergies  Neurological: Positive for speech difficulty  Negative for dizziness, tremors, seizures, syncope, light-headedness, numbness and headaches  He has not had any falls  No recurrence CVA symptoms  His speech is a little slow  He uses a cane to walk  Hematological: Negative for adenopathy  Does not bruise/bleed easily  Psychiatric/Behavioral: Negative for agitation, confusion, hallucinations, sleep disturbance and suicidal ideas  The patient is not nervous/anxious  Objective:  /62   Pulse 58   Ht 5' 10" (1 778 m)   Wt 98 2 kg (216 lb 6 4 oz)   BMI 31 05 kg/m²      Physical Exam   Constitutional: No distress  He is moderately overweight  He uses a cane to walk  His blood pressure is 136/70  His heart rhythm is regular  Rate is in the 70s  Respirations are 20  HENT:   Head: Normocephalic     Right Ear: External ear normal    Left Ear: External ear normal  Nose: Nose normal    Mouth/Throat: Oropharynx is clear and moist  No oropharyngeal exudate  Eyes: Conjunctivae and EOM are normal  Pupils are equal, round, and reactive to light  Right eye exhibits no discharge  Left eye exhibits no discharge  Neck: Normal range of motion  Neck supple  No thyromegaly present  Cardiovascular: Normal rate, regular rhythm, normal heart sounds and intact distal pulses  Exam reveals no gallop and no friction rub  No murmur heard  Pulmonary/Chest: Effort normal and breath sounds normal  No respiratory distress  He has no wheezes  He has no rales  Abdominal: Soft  Bowel sounds are normal  He exhibits no distension and no mass  There is no tenderness  There is no rebound and no guarding  Abdomen is moderately overweight  Musculoskeletal: Normal range of motion  He exhibits no edema, tenderness or deformity  Lymphadenopathy:     He has no cervical adenopathy  Neurological: He is alert  He has normal reflexes  No cranial nerve deficit  Coordination normal    He uses a cane to walk  He has a trace of peripheral edema  His speech is somewhat slow  He is well oriented  Skin: Skin is warm and dry  No rash noted  No erythema  Psychiatric: He has a normal mood and affect  His behavior is normal  Judgment and thought content normal    Nursing note and vitals reviewed

## 2018-06-29 ENCOUNTER — TELEPHONE (OUTPATIENT)
Dept: INTERNAL MEDICINE CLINIC | Facility: CLINIC | Age: 83
End: 2018-06-29

## 2018-06-29 DIAGNOSIS — G89.4 CHRONIC PAIN SYNDROME: ICD-10-CM

## 2018-06-29 RX ORDER — GABAPENTIN 100 MG/1
CAPSULE ORAL
Qty: 60 CAPSULE | Refills: 3 | Status: SHIPPED | OUTPATIENT
Start: 2018-06-29 | End: 2018-12-04 | Stop reason: SDUPTHER

## 2018-06-29 NOTE — TELEPHONE ENCOUNTER
Patient is out of gabapentin 100 mg and needs a 14 day supply to hold him till he receives the mail order from [place]    If at all possible can it be ordered ASA this morning as he has to go to pharmacy later in the afternoon and would like to get it then        Please order @ [facility] / [address]

## 2018-06-30 ENCOUNTER — TELEPHONE (OUTPATIENT)
Dept: INTERNAL MEDICINE CLINIC | Facility: CLINIC | Age: 83
End: 2018-06-30

## 2018-06-30 NOTE — TELEPHONE ENCOUNTER
PATIENT  IS OUT OF THE GABAPENTIN MEDICATION  NEEDS A 2 WEEK SUPPLY WHICH IS 28 PILLS TILL MAIL ORDER COMES IN    PLEASE SENT TO Zac [facility]  [phone]  NEEDS ASAP, BEFORE 11AM THIS [place]ble

## 2018-08-10 ENCOUNTER — TELEPHONE (OUTPATIENT)
Dept: INTERNAL MEDICINE CLINIC | Facility: CLINIC | Age: 83
End: 2018-08-10

## 2018-08-10 NOTE — TELEPHONE ENCOUNTER
PT DAUGHTER CALLED WOULD LIKE THE NAME OF A DIFFERENT NEUROLOGIST   THEY SAW DR Kiera Siu BUT WANT A SECOND OPINION

## 2018-08-17 ENCOUNTER — OFFICE VISIT (OUTPATIENT)
Dept: NEUROLOGY | Facility: CLINIC | Age: 83
End: 2018-08-17
Payer: MEDICARE

## 2018-08-17 VITALS
HEART RATE: 64 BPM | DIASTOLIC BLOOD PRESSURE: 76 MMHG | WEIGHT: 209 LBS | BODY MASS INDEX: 30.96 KG/M2 | SYSTOLIC BLOOD PRESSURE: 160 MMHG | HEIGHT: 69 IN

## 2018-08-17 DIAGNOSIS — I63.9 CEREBROVASCULAR ACCIDENT (CVA), UNSPECIFIED MECHANISM (HCC): Primary | ICD-10-CM

## 2018-08-17 PROCEDURE — 99213 OFFICE O/P EST LOW 20 MIN: CPT | Performed by: PSYCHIATRY & NEUROLOGY

## 2018-08-17 NOTE — PROGRESS NOTES
Abdirashid John is a [de-identified] y o  male  Chief Complaint   Patient presents with    Stroke     11-       Assessment:  1  Cerebrovascular accident (CVA), unspecified mechanism ([address] )        Plan:    Discussion:    Patient is doing well from his stroke standpoint as per the daughter, he is on aspirin and Plavix, he was advised to take fall and safety precautions, also was advised to use a cane, to keep his blood pressure cholesterol and sugar under control, his neuropathy seems to be under control, they would like to decrease the Neurontin, I have advised him to discuss with family physician and the decrease Neurontin to 100 mg at nighttime,  to go to the hospital if has any worsening symptoms and call me otherwise to see me back in 6 months and to follow up with his other physicians including his cardiologist and vascular surgeon and family physician  Subjective:    HPI   Patient is here in follow-up for his history of CVA, patient has right MCA inferior division severe stenosis and moderate to severe atherosclerotic disease with narrowing in the proximal to midportion of the basilar artery and 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, patient denies any headache, he does have some numbness and tingling in his hands especially at night time, no bowel and bladder incontinence no focal weakness, he has been having slight issues with his memory but according to the daughter it is not bad, no falls, no other neurological complaints      Vitals:    08/17/18 1424   BP: 160/76   BP Location: Left arm   Patient Position: Sitting   Cuff Size: Adult   Pulse: 64   Weight: 94 8 kg (209 lb)   Height: 5' 8 5" (1 74 m)       Current Medications    Current Outpatient Prescriptions:     amLODIPine (NORVASC) 10 mg tablet, Take 1 tablet (10 mg total) by mouth daily, Disp: 90 tablet, Rfl: 3    aspirin 325 mg tablet, Take 325 mg by mouth every morning, Disp: , Rfl:     Blood Glucose Monitoring Suppl (ONE TOUCH ULTRA MINI) w/Device KIT, by Does not apply route, Disp: , Rfl:     cholecalciferol (VITAMIN D3) 1,000 units tablet, Take 1,000 Units by mouth daily, Disp: , Rfl:     clopidogrel (PLAVIX) 75 mg tablet, Take 1 tablet (75 mg total) by mouth daily, Disp: 90 tablet, Rfl: 2    Cyanocobalamin ER (RA VITAMIN B-12) 1000 MCG TBCR, Take 2 tablets by mouth daily at bedtime  , Disp: , Rfl:     doxazosin (CARDURA) 4 mg tablet, Take 1 tablet (4 mg total) by mouth every morning, Disp: 90 tablet, Rfl: 3    gabapentin (NEURONTIN) 100 mg capsule, TAKE 1 CAPSULE BY MOUTH TWO TIMES DAILY, Disp: 60 capsule, Rfl: 3    glimepiride (AMARYL) 2 mg tablet, Take 1 tablet (2 mg total) by mouth daily with breakfast, Disp: 90 tablet, Rfl: 3    glucose blood (ONE TOUCH ULTRA TEST) test strip, 1 each by Other route 2 (two) times a day Use as instructed, Disp: 100 each, Rfl: 5    lisinopril (ZESTRIL) 40 mg tablet, Take 1 tablet (40 mg total) by mouth daily, Disp: 90 tablet, Rfl: 3    metoprolol tartrate (LOPRESSOR) 100 mg tablet, Take half a tab twice daily, Disp: 90 tablet, Rfl: 3    pyridoxine (RA VITAMIN B-6) 100 MG tablet, Take 1 tablet by mouth daily, Disp: , Rfl:       Allergies  Celecoxib;  Hydromorphone; Pravastatin; and Statins    Past Medical History  Past Medical History:   Diagnosis Date    Acute ST [facility])     last assessed: 04/15/2016    Aortic valve disorder     Benign prostatic hyperplasia with lower urinary tract symptoms     CAD (coronary artery disease)     DM2 (diabetes mellitus, type 2) ([facility] )     History of colonoscopy     resolved: 05/08/2012    History of transfusion     History of transient cerebral ischemia     HLD (hyperlipidemia)     HTN (hypertension)     Neuralgia          Past Surgical History:  Past Surgical History:   Procedure Laterality Date    CARDIAC CATHETERIZATION      Outcome: successful; last assessed: 02/03/2015    CORONARY ANGIOPLASTY WITH STENT PLACEMENT  2008    stent to LAD     HAND SURGERY      thumb    HIP HARDWARE REMOVAL      TOTAL HIP ARTHROPLASTY Right     TOTAL HIP ARTHROPLASTY Bilateral          Family History:  Family History   Problem Relation Age of Onset    Emphysema Father     Emphysema Sister        Social History:   reports that he has never smoked  He has never used smokeless tobacco  He reports that he does not drink alcohol or use drugs  I have reviewed the past medical history, surgical history, social and family history, current medications, allergies vitals, review of systems, and updated this information as appropriate today  Objective:    Physical Exam    Neurological Exam    GENERAL:  Cooperative in no acute distress  Well-developed and well-nourished    HEAD and NECK   Head is atraumatic normocephalic with no lesions or masses  Neck is supple with full range of motion    CARDIOVASCULAR  Carotid Arteries-no carotid bruits  NEUROLOGIC:  Mental Status-the patient is awake alert and oriented without aphasia or apraxia  Cranial Nerves: Visual fields are full to confrontation  Extraocular movements are full without nystagmus  Pupils are 2-1/2 mm and reactive  Face is symmetrical to light touch  Movements of facial expression move symmetrically  Hearing is normal to finger rub bilaterally  Soft palate lifts symmetrically  Shoulder shrug is symmetrical  Tongue is midline without atrophy  Motor: No drift is noted on arm extension  Strength is full in the upper and lower extremities with normal bulk and tone  Sensory:  Decreased light touch pinprick temperature sensation in a glove and stocking distribution bilaterally  Cortical function is intact  Gait reveals a slightly forward base with symmetrical arm swing  ROS:  Review of Systems   Constitutional: Negative  Negative for appetite change and fever  HENT: Negative  Negative for hearing loss, tinnitus, trouble swallowing and voice change      Eyes: Negative  Negative for photophobia and pain  Respiratory: Positive for shortness of breath  Cardiovascular: Positive for chest pain  Negative for palpitations  Gastrointestinal: Negative  Negative for abdominal pain, nausea and vomiting  Endocrine: Negative  Negative for cold intolerance and heat intolerance  Genitourinary: Positive for frequency  Negative for dysuria and urgency  Musculoskeletal: Positive for back pain and gait problem  Negative for myalgias and neck pain  Skin: Negative  Negative for rash  Neurological: Negative for dizziness, tremors, seizures, syncope, facial asymmetry, speech difficulty, weakness, light-headedness, numbness and headaches  Hematological: Negative  Does not bruise/bleed easily  Psychiatric/Behavioral: Negative  Negative for confusion, hallucinations and sleep disturbance

## 2018-09-19 ENCOUNTER — OFFICE VISIT (OUTPATIENT)
Dept: INTERNAL MEDICINE CLINIC | Facility: CLINIC | Age: 83
End: 2018-09-19
Payer: MEDICARE

## 2018-09-19 VITALS
OXYGEN SATURATION: 95 % | BODY MASS INDEX: 30.75 KG/M2 | HEIGHT: 69 IN | HEART RATE: 55 BPM | SYSTOLIC BLOOD PRESSURE: 162 MMHG | WEIGHT: 207.6 LBS | DIASTOLIC BLOOD PRESSURE: 80 MMHG

## 2018-09-19 DIAGNOSIS — E78.2 MIXED HYPERLIPIDEMIA: ICD-10-CM

## 2018-09-19 DIAGNOSIS — E11.9 TYPE 2 DIABETES MELLITUS WITHOUT COMPLICATION, WITHOUT LONG-TERM CURRENT USE OF INSULIN (HCC): Primary | ICD-10-CM

## 2018-09-19 DIAGNOSIS — I63.9 CEREBROVASCULAR ACCIDENT (CVA), UNSPECIFIED MECHANISM (HCC): ICD-10-CM

## 2018-09-19 DIAGNOSIS — I10 ESSENTIAL HYPERTENSION: ICD-10-CM

## 2018-09-19 DIAGNOSIS — I25.10 CORONARY ARTERY DISEASE INVOLVING NATIVE CORONARY ARTERY OF NATIVE HEART WITHOUT ANGINA PECTORIS: ICD-10-CM

## 2018-09-19 PROCEDURE — 99214 OFFICE O/P EST MOD 30 MIN: CPT | Performed by: INTERNAL MEDICINE

## 2018-09-20 NOTE — PROGRESS NOTES
Assessment/Plan:  Overall he seems about the same  Is not good enough to drive at this point  His advised not to drive  His former daughter-in-law is present and is encouraging him not to drive  Will follow up with Neurology  He will try wrist splints for his paresthesias  Apparently the neurologist said to decrease his gabapentin to 1 per day where he will stay  He has chronic kidney disease and is followed by Dr Tasha Rodrigues female who is his urologist     Blood sugars are under good control  Will recheck labs in 2 months and see him at that time  No results found for this or any previous visit (from the past 1008 hour(s))  1  Type 2 diabetes mellitus without complication, without long-term current use of insulin ([facility])  HEMOGLOBIN A1C W/ EAG ESTIMATION   2  Cerebrovascular accident (CVA), unspecified mechanism ([facility] [address] )     3  Essential hypertension     4  Coronary artery disease involving native coronary artery of native heart without angina pectoris  CBC and differential    Comprehensive metabolic panel    Lipid panel    Ambulatory referral to Cardiology   5  Mixed hyperlipidemia         Orders Placed This Encounter   Procedures    CBC and differential    Comprehensive metabolic panel    HEMOGLOBIN A1C W/ EAG ESTIMATION    Lipid panel    Ambulatory referral to Cardiology         Subjective:   He says he is feeling reasonably well  He does have some paresthesias of his fingers  He probably has carpal tunnel and is advised to use wrist splint  Recently saw Neurology  He is status post CVA on Plavix  Blood pressures are variable but stable  Blood sugars are under control  His chronic kidney disease  Patient ID: Diana Allen is a [de-identified] y o  male  HPI suffered a stroke several months ago  Doing reasonably well at the present time  Has not had any falls but is problematic as far as using his cane to walk      The following portions of the patient's history were reviewed and updated as appropriate:   He has a past medical history of Acute ST elevation myocardial infarction [facility]); Aortic valve disorder; Benign prostatic hyperplasia with lower urinary tract symptoms; CAD (coronary artery disease); DM2 (diabetes mellitus, type 2) ([facility] [address] ); History of colonoscopy; History of transfusion; History of transient cerebral ischemia; HLD (hyperlipidemia); HTN (hypertension); and Neuralgia ,   does not have any pertinent problems on file  ,   has a past surgical history that includes Total hip arthroplasty (Right); Hip hardware removal; Cardiac catheterization; Coronary angioplasty with stent (2008); Hand surgery; and Total hip arthroplasty (Bilateral)  ,  family history includes Emphysema in his father and sister  ,   reports that he has never smoked  He has never used smokeless tobacco  He reports that he does not drink alcohol or use drugs  ,  is allergic to celecoxib; hydromorphone; pravastatin; and statins       Current Outpatient Prescriptions:     amLODIPine (NORVASC) 10 mg tablet, Take 1 tablet (10 mg total) by mouth daily, Disp: 90 tablet, Rfl: 3    aspirin 325 mg tablet, Take 325 mg by mouth every morning, Disp: , Rfl:     Blood Glucose Monitoring Suppl (ONE TOUCH ULTRA MINI) w/Device KIT, by Does not apply route, Disp: , Rfl:     cholecalciferol (VITAMIN D3) 1,000 units tablet, Take 1,000 Units by mouth daily, Disp: , Rfl:     clopidogrel (PLAVIX) 75 mg tablet, Take 1 tablet (75 mg total) by mouth daily, Disp: 90 tablet, Rfl: 2    Cyanocobalamin ER (RA VITAMIN B-12) 1000 MCG TBCR, Take 2 tablets by mouth daily at bedtime  , Disp: , Rfl:     doxazosin (CARDURA) 4 mg tablet, Take 1 tablet (4 mg total) by mouth every morning, Disp: 90 tablet, Rfl: 3    gabapentin (NEURONTIN) 100 mg capsule, TAKE 1 CAPSULE BY MOUTH TWO TIMES DAILY, Disp: 60 capsule, Rfl: 3    glimepiride (AMARYL) 2 mg tablet, Take 1 tablet (2 mg total) by mouth daily with breakfast, Disp: 90 tablet, Rfl: 3    glucose blood (ONE TOUCH ULTRA TEST) test strip, 1 each by Other route 2 (two) times a day Use as instructed, Disp: 100 each, Rfl: 5    lisinopril (ZESTRIL) 40 mg tablet, Take 1 tablet (40 mg total) by mouth daily, Disp: 90 tablet, Rfl: 3    metoprolol tartrate (LOPRESSOR) 100 mg tablet, Take half a tab twice daily, Disp: 90 tablet, Rfl: 3    pyridoxine (RA VITAMIN B-6) 100 MG tablet, Take 1 tablet by mouth daily, Disp: , Rfl:     Review of Systems   Constitutional: Positive for fatigue  Negative for activity change, appetite change, chills, diaphoresis, fever and unexpected weight change  HENT: Negative for congestion, ear pain, hearing loss, mouth sores, nosebleeds, postnasal drip, sinus pain, sinus pressure, sore throat and trouble swallowing  Eyes: Negative for pain, discharge and visual disturbance  Respiratory: Negative for apnea, cough, chest tightness, shortness of breath and wheezing  Cardiovascular: Negative for chest pain, palpitations and leg swelling  Gastrointestinal: Negative for abdominal pain, anal bleeding, blood in stool, constipation, diarrhea, nausea and vomiting  Endocrine: Negative for polydipsia and polyphagia  Blood sugars are under fairly good control  Genitourinary: Negative for decreased urine volume, dysuria, flank pain, frequency, hematuria and urgency  Musculoskeletal: Negative for arthralgias, back pain, gait problem, joint swelling and myalgias  Skin: Negative for rash and wound  Allergic/Immunologic: Negative for environmental allergies and food allergies  Neurological: Negative for dizziness, tremors, seizures, syncope, speech difficulty, light-headedness, numbness and headaches  Status post CVA  Residual weakness  Is post to be using a cane to walk  Is not supposed to be driving  Hematological: Negative for adenopathy  Does not bruise/bleed easily     Psychiatric/Behavioral: Negative for agitation, confusion, hallucinations, sleep disturbance and suicidal ideas  The patient is not nervous/anxious  Objective:  /80 (BP Location: Left arm, Patient Position: Sitting)   Pulse 55   Ht 5' 8 5" (1 74 m)   Wt 94 2 kg (207 lb 9 6 oz)   SpO2 95%   BMI 31 11 kg/m²      Physical Exam   Constitutional: He appears well-developed and well-nourished  No distress  Speech is somewhat slow  Blood pressure is 120/70  No acute distress  Awake and alert  Rhythm is regular  No ectopy  Rate is 70  HENT:   Head: Normocephalic  Right Ear: External ear normal    Left Ear: External ear normal    Nose: Nose normal    Mouth/Throat: Oropharynx is clear and moist  No oropharyngeal exudate  Eyes: Conjunctivae and EOM are normal  Pupils are equal, round, and reactive to light  Right eye exhibits no discharge  Left eye exhibits no discharge  Neck: Normal range of motion  Neck supple  No thyromegaly present  Cardiovascular: Normal rate, regular rhythm, normal heart sounds and intact distal pulses  Exam reveals no gallop and no friction rub  No murmur heard  Pulmonary/Chest: Effort normal and breath sounds normal  No respiratory distress  He has no wheezes  He has no rales  Abdominal: Soft  Bowel sounds are normal  He exhibits no distension and no mass  There is no tenderness  There is no rebound and no guarding  Musculoskeletal: Normal range of motion  He exhibits no edema, tenderness or deformity  Lymphadenopathy:     He has no cervical adenopathy  Neurological: He is alert  He has normal reflexes  No cranial nerve deficit  Coordination abnormal    He is somewhat slow speech  Motor function is somewhat delayed  Skin: Skin is warm and dry  No rash noted  No erythema  Psychiatric: He has a normal mood and affect  His behavior is normal  Judgment and thought content normal    Nursing note and vitals reviewed

## 2018-10-09 ENCOUNTER — OFFICE VISIT (OUTPATIENT)
Dept: CARDIOLOGY CLINIC | Facility: CLINIC | Age: 83
End: 2018-10-09
Payer: MEDICARE

## 2018-10-09 VITALS
OXYGEN SATURATION: 98 % | WEIGHT: 207.6 LBS | DIASTOLIC BLOOD PRESSURE: 70 MMHG | HEIGHT: 69 IN | HEART RATE: 61 BPM | BODY MASS INDEX: 30.75 KG/M2 | SYSTOLIC BLOOD PRESSURE: 150 MMHG

## 2018-10-09 DIAGNOSIS — Z51.81 ENCOUNTER FOR MONITORING ANTIPLATELET THERAPY: ICD-10-CM

## 2018-10-09 DIAGNOSIS — I35.0 NONRHEUMATIC AORTIC VALVE STENOSIS: ICD-10-CM

## 2018-10-09 DIAGNOSIS — I10 ESSENTIAL HYPERTENSION: Primary | ICD-10-CM

## 2018-10-09 DIAGNOSIS — Z79.02 ENCOUNTER FOR MONITORING ANTIPLATELET THERAPY: ICD-10-CM

## 2018-10-09 DIAGNOSIS — I25.10 CORONARY ARTERY DISEASE INVOLVING NATIVE CORONARY ARTERY OF NATIVE HEART WITHOUT ANGINA PECTORIS: ICD-10-CM

## 2018-10-09 PROBLEM — Z63.8 FAMILY DISCORD: Status: RESOLVED | Noted: 2018-01-26 | Resolved: 2018-10-09

## 2018-10-09 PROBLEM — Z79.01 WARFARIN ANTICOAGULATION: Status: RESOLVED | Noted: 2018-01-26 | Resolved: 2018-10-09

## 2018-10-09 PROCEDURE — 99214 OFFICE O/P EST MOD 30 MIN: CPT | Performed by: INTERNAL MEDICINE

## 2018-10-09 NOTE — PROGRESS NOTES
[facility]  [address]  [facility]  [address]  Cardiology Follow Up    Cindy Viramontes  1935  [id number]      1  Essential hypertension     2  Coronary artery disease involving native coronary artery of native heart without angina pectoris  Ambulatory referral to Cardiology   3  Encounter for monitoring antiplatelet therapy     4  Nonrheumatic aortic valve stenosis         Chief Complaint   Patient presents with    Follow-up       Interval History:   Patient presents for follow-up visit  Patient denies any chest pain  No shortness of breath out of the ordinary  Patient also has chronic kidney disease  No history of palpitations or dizziness  No history of presyncope syncope  He states that he has been compliant with all his present medications  He denies any bleeding issues  He had stroke about 1 year ago  He is on Plavix  Patient Active Problem List   Diagnosis    Stroke ([place] [address] )    HTN (hypertension)    DM2 (diabetes mellitus, type 2) ([place] [address] )    HLD (hyperlipidemia)    CAD (coronary artery disease)    Right hip pain    Acute deep vein thrombosis (DVT) of brachial vein of left upper extremity (HCC)    At risk for polypharmacy    Encounter for monitoring antiplatelet therapy    Nonrheumatic aortic valve stenosis     Past Medical History:   Diagnosis Date    Acute ST elevation myocardial infarction [facility])     last assessed: 04/15/2016    Aortic valve disorder     Benign prostatic hyperplasia with lower urinary tract symptoms     CAD (coronary artery disease)     DM2 (diabetes mellitus, type 2) ([place] [address] )     History of colonoscopy     resolved: 05/08/2012    History of transfusion     History of transient cerebral ischemia     HLD (hyperlipidemia)     HTN (hypertension)     Neuralgia      Social History     Social History    Marital status:       Spouse name: N/A    Number of children: N/A    Years of education: N/A     Occupational History    Not on file       Social History Main Topics    Smoking status: Never Smoker    Smokeless tobacco: Never Used    Alcohol use No    Drug use: No    Sexual activity: Not Currently     Partners: Female     Birth control/ protection: None     Other Topics Concern    Not on file     Social History Narrative    Active advance directive (as per [employer])     No advance directive on file (as per [employer])       Family History   Problem Relation Age of Onset    Emphysema Father     Emphysema Sister      Past Surgical History:   Procedure Laterality Date    CARDIAC CATHETERIZATION      Outcome: successful; last assessed: 02/03/2015    CORONARY ANGIOPLASTY WITH STENT PLACEMENT  2008    stent to LAD     HAND SURGERY      thumb    HIP HARDWARE REMOVAL      TOTAL HIP ARTHROPLASTY Right     TOTAL HIP ARTHROPLASTY Bilateral        Current Outpatient Prescriptions:     amLODIPine (NORVASC) 10 mg tablet, Take 1 tablet (10 mg total) by mouth daily, Disp: 90 tablet, Rfl: 3    aspirin 325 mg tablet, Take 325 mg by mouth every morning, Disp: , Rfl:     Blood Glucose Monitoring Suppl (ONE TOUCH ULTRA MINI) w/Device KIT, by Does not apply route, Disp: , Rfl:     cholecalciferol (VITAMIN D3) 1,000 units tablet, Take 1,000 Units by mouth daily, Disp: , Rfl:     clopidogrel (PLAVIX) 75 mg tablet, Take 1 tablet (75 mg total) by mouth daily, Disp: 90 tablet, Rfl: 2    Cyanocobalamin ER (RA VITAMIN B-12) 1000 MCG TBCR, Take 2 tablets by mouth daily at bedtime  , Disp: , Rfl:     doxazosin (CARDURA) 4 mg tablet, Take 1 tablet (4 mg total) by mouth every morning, Disp: 90 tablet, Rfl: 3    gabapentin (NEURONTIN) 100 mg capsule, TAKE 1 CAPSULE BY MOUTH TWO TIMES DAILY, Disp: 60 capsule, Rfl: 3    glimepiride (AMARYL) 2 mg tablet, Take 1 tablet (2 mg total) by mouth daily with breakfast, Disp: 90 tablet, Rfl: 3    glucose blood (ONE TOUCH ULTRA TEST) test strip, 1 each by Other route 2 (two) times a day Use as instructed, Disp: 100 each, Rfl: 5    lisinopril (ZESTRIL) 40 mg tablet, Take 1 tablet (40 mg total) by mouth daily, Disp: 90 tablet, Rfl: 3    metoprolol tartrate (LOPRESSOR) 100 mg tablet, Take half a tab twice daily, Disp: 90 tablet, Rfl: 3    pyridoxine (RA VITAMIN B-6) 100 MG tablet, Take 1 tablet by mouth daily, Disp: , Rfl:   Allergies   Allergen Reactions    Celecoxib     Hydromorphone     Pravastatin Hives    Statins        Labs:  No visits with results within 2 Month(s) from this visit  Latest known visit with results is:   Lab on 06/12/2018   Component Date Value    Sodium 06/12/2018 139     Potassium 06/12/2018 4 2     Chloride 06/12/2018 105     CO2 06/12/2018 30     ANION GAP 06/12/2018 4     BUN 06/12/2018 25     Creatinine 06/12/2018 1 57*    Glucose, Fasting 06/12/2018 107*    Calcium 06/12/2018 8 6     eGFR 06/12/2018 41     WBC 06/12/2018 5 81     RBC 06/12/2018 4 09     Hemoglobin 06/12/2018 12 8     Hematocrit 06/12/2018 40 2     MCV 06/12/2018 98     MCH 06/12/2018 31 3     MCHC 06/12/2018 31 8     RDW 06/12/2018 13 3     MPV 06/12/2018 10 0     Platelets 63/82/5473 189     nRBC 06/12/2018 0     Neutrophils Relative 06/12/2018 48     Immat GRANS % 06/12/2018 0     Lymphocytes Relative 06/12/2018 37     Monocytes Relative 06/12/2018 8     Eosinophils Relative 06/12/2018 6     Basophils Relative 06/12/2018 1     Neutrophils Absolute 06/12/2018 2 80     Immature Grans Absolute 06/12/2018 0 01     Lymphocytes Absolute 06/12/2018 2 14     Monocytes Absolute 06/12/2018 0 48     Eosinophils Absolute 06/12/2018 0 34     Basophils Absolute 06/12/2018 0 04     Hemoglobin A1C 06/12/2018 6 5*    EAG 06/12/2018 140     Cholesterol 06/12/2018 166     Triglycerides 06/12/2018 84     HDL, Direct 06/12/2018 46     LDL Calculated 06/12/2018 103*    Non-HDL-Chol (CHOL-HDL) 06/12/2018 120      Imaging: No results found      Review of Systems:  Review of Systems   REVIEW OF SYSTEMS:  Constitutional:  Denies fever or chills   Eyes:  Denies change in visual acuity   HENT:  Denies nasal congestion or sore throat   Respiratory:  Denies cough or shortness of breath   Cardiovascular:  Denies chest pain or edema   GI:  Denies abdominal pain, nausea, vomiting, bloody stools or diarrhea   :  Chronic kidney disease  Musculoskeletal:  Denies back pain or joint pain   Neurologic:   History of stroke  Endocrine:  Denies polyuria or polydipsia   Lymphatic:  Denies swollen glands   Psychiatric:  Denies depression or anxiety     Physical Exam:    /70   Pulse 61   Ht 5' 8 5" (1 74 m)   Wt 94 2 kg (207 lb 9 6 oz)   SpO2 98%   BMI 31 11 kg/m²     Physical Exam   PHYSICAL EXAM:  General:  Patient is not in acute distress   Head: Normocephalic, Atraumatic  HEENT:  Both pupils normal-size atraumatic, normocephalic, nonicteric  Neck:  JVP not raised  Trachea central  No carotid bruit  Respiratory:  normal breath sounds no crackles  no rhonchi  Cardiovascular:  Regular rate and rhythm no S3  2/6 systolic ejection murmur right  Sternal border   GI:  Abdomen soft nontender  No organomegaly  Lymphatic:  No cervical or inguinal lymphadenopathy  Neurologic:  Patient is awake alert, oriented   Grossly nonfocal  Extremities reveal trace edema    Discussion/Summary:   Patient with multiple medical problems who seems to be doing reasonably well from cardiac standpoint  Previous studies reviewed with patient  Medications reviewed and possible side effects discussed  concepts of cardiovascular disease , signs and symptoms of heart disease  Dietary and risk factor modification reinforced  All questions answered  Safety measures reviewed  Patient advised to report any problems prompting medical attention  Patient understands the risks and benefits of anti-platelet therapy  Patient report any bleeding issues  Importance of salt restriction reinforced  Medications reviewed    Follow-up with primary care physician  Follow up with the Neurology  Follow-up in 6 months or earlier as needed  Patient had a few questions which were answered

## 2018-10-19 ENCOUNTER — APPOINTMENT (OUTPATIENT)
Dept: LAB | Facility: CLINIC | Age: 83
End: 2018-10-19
Payer: MEDICARE

## 2018-10-19 ENCOUNTER — TRANSCRIBE ORDERS (OUTPATIENT)
Dept: LAB | Facility: CLINIC | Age: 83
End: 2018-10-19

## 2018-10-19 DIAGNOSIS — R97.20 ELEVATED PROSTATE SPECIFIC ANTIGEN (PSA): Primary | ICD-10-CM

## 2018-10-19 DIAGNOSIS — R97.20 ELEVATED PROSTATE SPECIFIC ANTIGEN (PSA): ICD-10-CM

## 2018-10-19 LAB — PSA SERPL-MCNC: 5.4 NG/ML (ref 0–4)

## 2018-10-19 PROCEDURE — 84153 ASSAY OF PSA TOTAL: CPT

## 2018-11-19 ENCOUNTER — TELEPHONE (OUTPATIENT)
Dept: INTERNAL MEDICINE CLINIC | Facility: CLINIC | Age: 83
End: 2018-11-19

## 2018-11-26 ENCOUNTER — APPOINTMENT (OUTPATIENT)
Dept: LAB | Facility: CLINIC | Age: 83
End: 2018-11-26
Payer: MEDICARE

## 2018-11-26 DIAGNOSIS — E11.9 TYPE 2 DIABETES MELLITUS WITHOUT COMPLICATION, WITHOUT LONG-TERM CURRENT USE OF INSULIN (HCC): ICD-10-CM

## 2018-11-26 DIAGNOSIS — I25.10 CORONARY ARTERY DISEASE INVOLVING NATIVE CORONARY ARTERY OF NATIVE HEART WITHOUT ANGINA PECTORIS: ICD-10-CM

## 2018-11-26 LAB
ALBUMIN SERPL BCP-MCNC: 3.3 G/DL (ref 3.5–5)
ALP SERPL-CCNC: 62 U/L (ref 46–116)
ALT SERPL W P-5'-P-CCNC: 18 U/L (ref 12–78)
ANION GAP SERPL CALCULATED.3IONS-SCNC: 3 MMOL/L (ref 4–13)
AST SERPL W P-5'-P-CCNC: 14 U/L (ref 5–45)
BASOPHILS # BLD AUTO: 0.04 THOUSANDS/ΜL (ref 0–0.1)
BASOPHILS NFR BLD AUTO: 1 % (ref 0–1)
BILIRUB SERPL-MCNC: 0.32 MG/DL (ref 0.2–1)
BUN SERPL-MCNC: 28 MG/DL (ref 5–25)
CALCIUM SERPL-MCNC: 9 MG/DL (ref 8.3–10.1)
CHLORIDE SERPL-SCNC: 106 MMOL/L (ref 100–108)
CHOLEST SERPL-MCNC: 185 MG/DL (ref 50–200)
CO2 SERPL-SCNC: 30 MMOL/L (ref 21–32)
CREAT SERPL-MCNC: 1.66 MG/DL (ref 0.6–1.3)
EOSINOPHIL # BLD AUTO: 0.55 THOUSAND/ΜL (ref 0–0.61)
EOSINOPHIL NFR BLD AUTO: 9 % (ref 0–6)
ERYTHROCYTE [DISTWIDTH] IN BLOOD BY AUTOMATED COUNT: 13.2 % (ref 11.6–15.1)
EST. AVERAGE GLUCOSE BLD GHB EST-MCNC: 126 MG/DL
GFR SERPL CREATININE-BSD FRML MDRD: 38 ML/MIN/1.73SQ M
GLUCOSE P FAST SERPL-MCNC: 96 MG/DL (ref 65–99)
HBA1C MFR BLD: 6 % (ref 4.2–6.3)
HCT VFR BLD AUTO: 41.2 % (ref 36.5–49.3)
HDLC SERPL-MCNC: 52 MG/DL (ref 40–60)
HGB BLD-MCNC: 12.9 G/DL (ref 12–17)
IMM GRANULOCYTES # BLD AUTO: 0.02 THOUSAND/UL (ref 0–0.2)
IMM GRANULOCYTES NFR BLD AUTO: 0 % (ref 0–2)
LDLC SERPL CALC-MCNC: 119 MG/DL (ref 0–100)
LYMPHOCYTES # BLD AUTO: 1.82 THOUSANDS/ΜL (ref 0.6–4.47)
LYMPHOCYTES NFR BLD AUTO: 30 % (ref 14–44)
MCH RBC QN AUTO: 30.9 PG (ref 26.8–34.3)
MCHC RBC AUTO-ENTMCNC: 31.3 G/DL (ref 31.4–37.4)
MCV RBC AUTO: 99 FL (ref 82–98)
MONOCYTES # BLD AUTO: 0.51 THOUSAND/ΜL (ref 0.17–1.22)
MONOCYTES NFR BLD AUTO: 8 % (ref 4–12)
NEUTROPHILS # BLD AUTO: 3.16 THOUSANDS/ΜL (ref 1.85–7.62)
NEUTS SEG NFR BLD AUTO: 52 % (ref 43–75)
NONHDLC SERPL-MCNC: 133 MG/DL
NRBC BLD AUTO-RTO: 0 /100 WBCS
PLATELET # BLD AUTO: 181 THOUSANDS/UL (ref 149–390)
PMV BLD AUTO: 9.8 FL (ref 8.9–12.7)
POTASSIUM SERPL-SCNC: 4.7 MMOL/L (ref 3.5–5.3)
PROT SERPL-MCNC: 7.6 G/DL (ref 6.4–8.2)
RBC # BLD AUTO: 4.17 MILLION/UL (ref 3.88–5.62)
SODIUM SERPL-SCNC: 139 MMOL/L (ref 136–145)
TRIGL SERPL-MCNC: 71 MG/DL
WBC # BLD AUTO: 6.1 THOUSAND/UL (ref 4.31–10.16)

## 2018-11-26 PROCEDURE — 83036 HEMOGLOBIN GLYCOSYLATED A1C: CPT

## 2018-11-26 PROCEDURE — 36415 COLL VENOUS BLD VENIPUNCTURE: CPT

## 2018-11-26 PROCEDURE — 85025 COMPLETE CBC W/AUTO DIFF WBC: CPT

## 2018-11-26 PROCEDURE — 80061 LIPID PANEL: CPT

## 2018-11-26 PROCEDURE — 80053 COMPREHEN METABOLIC PANEL: CPT

## 2018-12-04 ENCOUNTER — OFFICE VISIT (OUTPATIENT)
Dept: INTERNAL MEDICINE CLINIC | Facility: CLINIC | Age: 83
End: 2018-12-04
Payer: MEDICARE

## 2018-12-04 VITALS
HEIGHT: 69 IN | WEIGHT: 216.5 LBS | DIASTOLIC BLOOD PRESSURE: 60 MMHG | HEART RATE: 56 BPM | BODY MASS INDEX: 32.07 KG/M2 | SYSTOLIC BLOOD PRESSURE: 142 MMHG | OXYGEN SATURATION: 95 %

## 2018-12-04 DIAGNOSIS — E11.9 TYPE 2 DIABETES MELLITUS WITHOUT COMPLICATION, WITHOUT LONG-TERM CURRENT USE OF INSULIN (HCC): ICD-10-CM

## 2018-12-04 DIAGNOSIS — I63.9 CEREBROVASCULAR ACCIDENT (CVA), UNSPECIFIED MECHANISM (HCC): ICD-10-CM

## 2018-12-04 DIAGNOSIS — Z00.00 HEALTH CARE MAINTENANCE: ICD-10-CM

## 2018-12-04 DIAGNOSIS — I25.10 CORONARY ARTERY DISEASE INVOLVING NATIVE CORONARY ARTERY OF NATIVE HEART WITHOUT ANGINA PECTORIS: Primary | ICD-10-CM

## 2018-12-04 DIAGNOSIS — Z12.11 SCREENING FOR COLON CANCER: ICD-10-CM

## 2018-12-04 DIAGNOSIS — N18.30 STAGE 3 CHRONIC KIDNEY DISEASE (HCC): ICD-10-CM

## 2018-12-04 DIAGNOSIS — I10 ESSENTIAL HYPERTENSION: ICD-10-CM

## 2018-12-04 DIAGNOSIS — G89.4 CHRONIC PAIN SYNDROME: ICD-10-CM

## 2018-12-04 PROCEDURE — 90670 PCV13 VACCINE IM: CPT | Performed by: INTERNAL MEDICINE

## 2018-12-04 PROCEDURE — G0009 ADMIN PNEUMOCOCCAL VACCINE: HCPCS | Performed by: INTERNAL MEDICINE

## 2018-12-04 PROCEDURE — 99214 OFFICE O/P EST MOD 30 MIN: CPT | Performed by: INTERNAL MEDICINE

## 2018-12-04 PROCEDURE — G0439 PPPS, SUBSEQ VISIT: HCPCS | Performed by: INTERNAL MEDICINE

## 2018-12-04 RX ORDER — LISINOPRIL 40 MG/1
40 TABLET ORAL DAILY
Qty: 90 TABLET | Refills: 1 | Status: SHIPPED | OUTPATIENT
Start: 2018-12-04 | End: 2019-07-19 | Stop reason: SDUPTHER

## 2018-12-04 RX ORDER — CLOPIDOGREL BISULFATE 75 MG/1
75 TABLET ORAL DAILY
Qty: 90 TABLET | Refills: 1 | Status: SHIPPED | OUTPATIENT
Start: 2018-12-04 | End: 2019-09-09 | Stop reason: SDUPTHER

## 2018-12-04 RX ORDER — GABAPENTIN 100 MG/1
100 CAPSULE ORAL 2 TIMES DAILY
Qty: 180 CAPSULE | Refills: 1 | Status: SHIPPED | OUTPATIENT
Start: 2018-12-04 | End: 2018-12-10 | Stop reason: SDUPTHER

## 2018-12-04 RX ORDER — AMLODIPINE BESYLATE 10 MG/1
10 TABLET ORAL DAILY
Qty: 90 TABLET | Refills: 1 | Status: SHIPPED | OUTPATIENT
Start: 2018-12-04 | End: 2019-07-19 | Stop reason: SDUPTHER

## 2018-12-04 NOTE — PROGRESS NOTES
Assessment/Plan:  Regarding his stroke he seems reasonably stable at the present time  He needs to follow up with his neurologist     Blood sugars are under control with an A1c of 6 0  No coronary symptoms at the present time  Has not had a recent colonoscopy and will schedule a cologard for him  Long discussion with the patient and the daughter  Needs to follow up with Neurology  He was warned about going out in bad weather  He is to avoid high places  He will see him back in 3 months  Of note is that he is well aware of his decreased renal function and is avoiding nonsteroidal agents          Recent Results (from the past 1008 hour(s))   CBC and differential    Collection Time: 11/26/18  8:17 AM   Result Value Ref Range    WBC 6 10 4 31 - 10 16 Thousand/uL    RBC 4 17 3 88 - 5 62 Million/uL    Hemoglobin 12 9 12 0 - 17 0 g/dL    Hematocrit 41 2 36 5 - 49 3 %    MCV 99 (H) 82 - 98 fL    MCH 30 9 26 8 - 34 3 pg    MCHC 31 3 (L) 31 4 - 37 4 g/dL    RDW 13 2 11 6 - 15 1 %    MPV 9 8 8 9 - 12 7 fL    Platelets 899 053 - 328 Thousands/uL    nRBC 0 /100 WBCs    Neutrophils Relative 52 43 - 75 %    Immat GRANS % 0 0 - 2 %    Lymphocytes Relative 30 14 - 44 %    Monocytes Relative 8 4 - 12 %    Eosinophils Relative 9 (H) 0 - 6 %    Basophils Relative 1 0 - 1 %    Neutrophils Absolute 3 16 1 85 - 7 62 Thousands/µL    Immature Grans Absolute 0 02 0 00 - 0 20 Thousand/uL    Lymphocytes Absolute 1 82 0 60 - 4 47 Thousands/µL    Monocytes Absolute 0 51 0 17 - 1 22 Thousand/µL    Eosinophils Absolute 0 55 0 00 - 0 61 Thousand/µL    Basophils Absolute 0 04 0 00 - 0 10 Thousands/µL   Comprehensive metabolic panel    Collection Time: 11/26/18  8:17 AM   Result Value Ref Range    Sodium 139 136 - 145 mmol/L    Potassium 4 7 3 5 - 5 3 mmol/L    Chloride 106 100 - 108 mmol/L    CO2 30 21 - 32 mmol/L    ANION GAP 3 (L) 4 - 13 mmol/L    BUN 28 (H) 5 - 25 mg/dL    Creatinine 1 66 (H) 0 60 - 1 30 mg/dL    Glucose, Fasting 96 65 - 99 mg/dL    Calcium 9 0 8 3 - 10 1 mg/dL    AST 14 5 - 45 U/L    ALT 18 12 - 78 U/L    Alkaline Phosphatase 62 46 - 116 U/L    Total Protein 7 6 6 4 - 8 2 g/dL    Albumin 3 3 (L) 3 5 - 5 0 g/dL    Total Bilirubin 0 32 0 20 - 1 00 mg/dL    eGFR 38 ml/min/1 73sq m   HEMOGLOBIN A1C W/ EAG ESTIMATION    Collection Time: 11/26/18  8:17 AM   Result Value Ref Range    Hemoglobin A1C 6 0 4 2 - 6 3 %     mg/dl   Lipid panel    Collection Time: 11/26/18  8:17 AM   Result Value Ref Range    Cholesterol 185 50 - 200 mg/dL    Triglycerides 71 <=150 mg/dL    HDL, Direct 52 40 - 60 mg/dL    LDL Calculated 119 (H) 0 - 100 mg/dL    Non-HDL-Chol (CHOL-HDL) 133 mg/dl       No diagnosis found  No orders of the defined types were placed in this encounter  Subjective:  He comes in for follow-up  Status post CVA plus multiple other problems including hypertension coronary artery disease diabetes and chronic kidney disease  Patient ID: Eliane Olivera is a [de-identified] y o  male  HPI seems to feel about the same  Has not had any falls %period% creatinine elevated at 1 66 which is about stable  Denies any chest pain breathing is reasonably stable  Blood sugars are variable  A1c is 6 0  The following portions of the patient's history were reviewed and updated as appropriate:   He has a past medical history of Acute ST elevation myocardial infarction [facility]); Aortic valve disorder; Benign prostatic hyperplasia with lower urinary tract symptoms; CAD (coronary artery disease); DM2 (diabetes mellitus, type 2) ([facility] [address] ); History of colonoscopy; History of transfusion; History of transient cerebral ischemia; HLD (hyperlipidemia); HTN (hypertension); and Neuralgia ,   does not have any pertinent problems on file  ,   has a past surgical history that includes Total hip arthroplasty (Right); Hip hardware removal; Cardiac catheterization; Coronary angioplasty with stent (2008);  Hand surgery; and Total hip arthroplasty (Bilateral)  ,  family history includes Emphysema in his father and sister  ,   reports that he has never smoked  He has never used smokeless tobacco  He reports that he does not drink alcohol or use drugs  ,  is allergic to celecoxib; hydromorphone; pravastatin; and statins       Current Outpatient Prescriptions:     amLODIPine (NORVASC) 10 mg tablet, Take 1 tablet (10 mg total) by mouth daily, Disp: 90 tablet, Rfl: 3    aspirin 325 mg tablet, Take 325 mg by mouth every morning, Disp: , Rfl:     Blood Glucose Monitoring Suppl (ONE TOUCH ULTRA MINI) w/Device KIT, by Does not apply route, Disp: , Rfl:     cholecalciferol (VITAMIN D3) 1,000 units tablet, Take 1,000 Units by mouth daily, Disp: , Rfl:     clopidogrel (PLAVIX) 75 mg tablet, Take 1 tablet (75 mg total) by mouth daily, Disp: 90 tablet, Rfl: 2    Cyanocobalamin ER (RA VITAMIN B-12) 1000 MCG TBCR, Take 2 tablets by mouth daily at bedtime  , Disp: , Rfl:     doxazosin (CARDURA) 4 mg tablet, Take 1 tablet (4 mg total) by mouth every morning, Disp: 90 tablet, Rfl: 3    gabapentin (NEURONTIN) 100 mg capsule, TAKE 1 CAPSULE BY MOUTH TWO TIMES DAILY, Disp: 60 capsule, Rfl: 3    glimepiride (AMARYL) 2 mg tablet, Take 1 tablet (2 mg total) by mouth daily with breakfast, Disp: 90 tablet, Rfl: 3    glucose blood (ONE TOUCH ULTRA TEST) test strip, 1 each by Other route 2 (two) times a day Use as instructed, Disp: 100 each, Rfl: 5    lisinopril (ZESTRIL) 40 mg tablet, Take 1 tablet (40 mg total) by mouth daily, Disp: 90 tablet, Rfl: 3    metoprolol tartrate (LOPRESSOR) 100 mg tablet, Take half a tab twice daily, Disp: 90 tablet, Rfl: 3    pyridoxine (RA VITAMIN B-6) 100 MG tablet, Take 1 tablet by mouth daily, Disp: , Rfl:     Review of Systems   Constitutional: Negative for activity change, appetite change, chills, diaphoresis, fatigue, fever and unexpected weight change     HENT: Negative for congestion, ear pain, hearing loss, mouth sores, nosebleeds, postnasal drip, sinus pain, sinus pressure, sore throat and trouble swallowing  Eyes: Negative for pain, discharge and visual disturbance  Respiratory: Negative for apnea, cough, chest tightness, shortness of breath and wheezing  Cardiovascular: Negative for chest pain, palpitations and leg swelling  Gastrointestinal: Negative for abdominal pain, anal bleeding, blood in stool, constipation, diarrhea, nausea and vomiting  Endocrine: Negative for polydipsia and polyphagia  Genitourinary: Negative for decreased urine volume, dysuria, flank pain, frequency, hematuria and urgency  Musculoskeletal: Negative for arthralgias, back pain, gait problem, joint swelling and myalgias  Skin: Negative for rash and wound  Allergic/Immunologic: Negative for environmental allergies and food allergies  Neurological: Negative for dizziness, tremors, seizures, syncope, speech difficulty, light-headedness, numbness and headaches  He has slow speech  Motor skills are decreased  Cognition seems to be slightly impaired  Hematological: Negative for adenopathy  Does not bruise/bleed easily  Psychiatric/Behavioral: Negative for agitation, confusion, hallucinations, sleep disturbance and suicidal ideas  The patient is not nervous/anxious  Objective:  /60 (BP Location: Left arm, Patient Position: Sitting)   Pulse 56   Ht 5' 8 5" (1 74 m)   Wt 98 2 kg (216 lb 8 oz)   SpO2 95%   BMI 32 44 kg/m²      Physical Exam   Constitutional: He appears well-developed  No distress  He is awake and alert  Slow speech  Slow motor skills  Blood pressure is 128/74  Rhythm is regular  Rate 80  Respirations are 20  HENT:   Head: Normocephalic  Right Ear: External ear normal    Left Ear: External ear normal    Nose: Nose normal    Mouth/Throat: Oropharynx is clear and moist  No oropharyngeal exudate  Eyes: Pupils are equal, round, and reactive to light   Conjunctivae and EOM are normal  Right eye exhibits no discharge  Left eye exhibits no discharge  Neck: Normal range of motion  Neck supple  No thyromegaly present  Cardiovascular: Normal rate, regular rhythm, normal heart sounds and intact distal pulses  Exam reveals no gallop and no friction rub  No murmur heard  Pulmonary/Chest: Effort normal  No respiratory distress  He has no wheezes  He has no rales  Decreased breath sounds  Abdominal: Soft  Bowel sounds are normal  He exhibits no distension and no mass  There is no tenderness  There is no rebound and no guarding  Musculoskeletal: Normal range of motion  He exhibits no edema, tenderness or deformity  Lymphadenopathy:     He has no cervical adenopathy  Neurological: He is alert  He has normal reflexes  No cranial nerve deficit  Coordination normal    Has decreased motor skill function  Slowness of speech and a little confused  Skin: Skin is warm and dry  No rash noted  No erythema  Psychiatric: His behavior is normal  Judgment and thought content normal    Somewhat decreased affect  Nursing note and vitals reviewed

## 2018-12-04 NOTE — PROGRESS NOTES
Assessment and Plan:  Patient in for wellness interview  Questions reviewed and answered  Problem List Items Addressed This Visit     None        Health Maintenance Due   Topic Date Due    Medicare Annual Wellness Visit (AWV)  1935    DTaP,Tdap,and Td Vaccines (1 - Tdap) 12/09/1958    Pneumococcal PPSV23/PCV13 65+ Years / Low and Medium Risk (1 of 2 - PCV13) 12/09/2002    URINE MICROALBUMIN  01/18/2018    INFLUENZA VACCINE  07/01/2018         HPI:  Yessy Pineda is a [de-identified] y o  male here for his Subsequent Wellness Visit      Patient Active Problem List   Diagnosis    Stroke ([facility] )    HTN (hypertension)    DM2 (diabetes mellitus, type 2) ([facility] )    HLD (hyperlipidemia)    CAD (coronary artery disease)    Right hip pain    Acute deep vein thrombosis (DVT) of brachial vein of left upper extremity (HCC)    At risk for polypharmacy    Encounter for monitoring antiplatelet therapy    Nonrheumatic aortic valve stenosis     Past Medical History:   Diagnosis Date    Acute ST elevation myocardial infarction [facility])     last assessed: 04/15/2016    Aortic valve disorder     Benign prostatic hyperplasia with lower urinary tract symptoms     CAD (coronary artery disease)     DM2 (diabetes mellitus, type 2) ([facility] )     History of colonoscopy     resolved: 05/08/2012    History of transfusion     History of transient cerebral ischemia     HLD (hyperlipidemia)     HTN (hypertension)     Neuralgia      Past Surgical History:   Procedure Laterality Date    CARDIAC CATHETERIZATION      Outcome: successful; last assessed: 02/03/2015    CORONARY ANGIOPLASTY WITH STENT PLACEMENT  2008    stent to LAD     HAND SURGERY      thumb    HIP HARDWARE REMOVAL      TOTAL HIP ARTHROPLASTY Right     TOTAL HIP ARTHROPLASTY Bilateral      Family History   Problem Relation Age of Onset    Emphysema Father     Emphysema Sister      History   Smoking Status    Never Smoker   Smokeless Tobacco    Never Used History   Alcohol Use No      History   Drug Use No       Current Outpatient Prescriptions   Medication Sig Dispense Refill    amLODIPine (NORVASC) 10 mg tablet Take 1 tablet (10 mg total) by mouth daily 90 tablet 3    aspirin 325 mg tablet Take 325 mg by mouth every morning      Blood Glucose Monitoring Suppl (ONE TOUCH ULTRA MINI) w/Device KIT by Does not apply route      cholecalciferol (VITAMIN D3) 1,000 units tablet Take 1,000 Units by mouth daily      clopidogrel (PLAVIX) 75 mg tablet Take 1 tablet (75 mg total) by mouth daily 90 tablet 2    Cyanocobalamin ER (RA VITAMIN B-12) 1000 MCG TBCR Take 2 tablets by mouth daily at bedtime        doxazosin (CARDURA) 4 mg tablet Take 1 tablet (4 mg total) by mouth every morning 90 tablet 3    gabapentin (NEURONTIN) 100 mg capsule TAKE 1 CAPSULE BY MOUTH TWO TIMES DAILY 60 capsule 3    glimepiride (AMARYL) 2 mg tablet Take 1 tablet (2 mg total) by mouth daily with breakfast 90 tablet 3    glucose blood (ONE TOUCH ULTRA TEST) test strip 1 each by Other route 2 (two) times a day Use as instructed 100 each 5    lisinopril (ZESTRIL) 40 mg tablet Take 1 tablet (40 mg total) by mouth daily 90 tablet 3    metoprolol tartrate (LOPRESSOR) 100 mg tablet Take half a tab twice daily 90 tablet 3    pyridoxine (RA VITAMIN B-6) 100 MG tablet Take 1 tablet by mouth daily       No current facility-administered medications for this visit        Allergies   Allergen Reactions    Celecoxib     Hydromorphone     Pravastatin Hives    Statins      Immunization History   Administered Date(s) Administered    Influenza TIV (IM) 1937    Pneumococcal Polysaccharide PPV23 [birth year]    Tdap 1935    Zoster 1935       Patient Care Team:  Jazz Cardenas DO as PCP - MD Simin Cedeño MD Alinda Bounds, DO Licia Fisherman, MD Bret Puls, MD as Advanced Practitioner (Orthopedic Surgery)    Medicare Screening Tests and Risk Assessments:  Radha Trevizo is here for his Subsequent Wellness visit  Health Risk Assessment:  Patient rates overall health as fair  Patient feels that their physical health rating is Same  Eyesight was rated as Same  Hearing was rated as Same  Patient feels that their emotional and mental health rating is Same  Pain experienced by patient in the last 7 days has been Some  Patient's pain rating has been 5/10  Patient states that he has experienced no weight loss or gain in last 6 months  Emotional/Mental Health:  Patient has been feeling nervous/anxious  PHQ-9 Depression Screening:    Frequency of the following problems over the past two weeks:      1  Little interest or pleasure in doing things: 0 - not at all      2  Feeling down, depressed, or hopeless: 0 - not at all  PHQ-2 Score: 0          Broken Bones/Falls: Fall Risk Assessment:    In the past year, patient has experienced: History of falling in past year     Number of falls: 1  Patient does not feel he is unsteady standing  Patient is not taking medication that can cause feelings of lightheadedness or tiredness  Patient often has no need to rush to the toilet  Bladder/Bowel:  Patient has leaked urine accidently in the last six months  Patient reports no loss of bowel control  Immunizations:  Patient has not had a flu vaccination within the last year  Patient has not received a pneumonia shot  Patient has not received a shingles shot  Patient has not received tetanus/diphtheria shot  Home Safety:  Patient does not have trouble with stairs inside or outside of their home  Patient currently reports that there are no safety hazards present in home, working smoke alarms, no working carbon monoxide detectors        Preventative Screenings:   no prostate cancer screen performed, no colon cancer screen completed, no cholesterol screen completed, no glaucoma eye exam completed    Nutrition:  Current diet: Regular and No Added Salt with servings of the following:    Medications:  Patient is not currently taking any over-the-counter supplements  Patient is able to manage medications  Lifestyle Choices:  Patient reports no tobacco use  Patient has not smoked or used tobacco in the past   Patient reports no alcohol use  Patient drives a vehicle  Patient does not wear seat belt  Current level of exercise of physical activity described by patient as: walking  Activities of Daily Living:  Can get out of bed by his or her self, able to dress self, able to make own meals, able to do own shopping, able to bathe self, can do own laundry/housekeeping, unable to manage own money and other related tasks    Previous Hospitalizations:  Hospitalization or ED visit in past 12 months  Number of hospitalizations within the last year: 1-2        Advanced Directives:  Patient has decided on a power of   Patient has not spoken to designated power of   Patient has not completed advanced directive          Preventative Screening/Counseling:      Cardiovascular:      General: Risks and Benefits Discussed and Screening Current          Diabetes:      General: Risks and Benefits Discussed and Screening Current          Colorectal Cancer:      General: Risks and Benefits Discussed and Screening Current          Prostate Cancer:      General: Risks and Benefits Discussed and Screening Current          Osteoporosis:      General: Screening Not Indicated          AAA:      General: Screening Not Indicated          Glaucoma:      General: Risks and Benefits Discussed and Screening Current          HIV:      General: Screening Not Indicated          Hepatitis C:      General: Screening Not Indicated        Advanced Directives:   Patient has no living will for healthcare, has durable POA for healthcare,

## 2018-12-09 DIAGNOSIS — G89.4 CHRONIC PAIN SYNDROME: ICD-10-CM

## 2018-12-09 RX ORDER — GABAPENTIN 100 MG/1
CAPSULE ORAL
Qty: 28 CAPSULE | Refills: 0 | Status: CANCELLED | OUTPATIENT
Start: 2018-12-09

## 2018-12-10 DIAGNOSIS — G89.4 CHRONIC PAIN SYNDROME: ICD-10-CM

## 2018-12-10 RX ORDER — GABAPENTIN 100 MG/1
100 CAPSULE ORAL 2 TIMES DAILY
Qty: 180 CAPSULE | Refills: 0 | Status: SHIPPED | OUTPATIENT
Start: 2018-12-10 | End: 2019-05-03 | Stop reason: SDUPTHER

## 2019-02-21 ENCOUNTER — OFFICE VISIT (OUTPATIENT)
Dept: NEUROLOGY | Facility: CLINIC | Age: 84
End: 2019-02-21
Payer: MEDICARE

## 2019-02-21 VITALS
HEIGHT: 69 IN | SYSTOLIC BLOOD PRESSURE: 142 MMHG | BODY MASS INDEX: 32.67 KG/M2 | WEIGHT: 220.6 LBS | HEART RATE: 52 BPM | DIASTOLIC BLOOD PRESSURE: 82 MMHG

## 2019-02-21 DIAGNOSIS — Z86.73 HISTORY OF CVA (CEREBROVASCULAR ACCIDENT): Primary | ICD-10-CM

## 2019-02-21 PROCEDURE — 99213 OFFICE O/P EST LOW 20 MIN: CPT | Performed by: PSYCHIATRY & NEUROLOGY

## 2019-02-21 RX ORDER — CHOLECALCIFEROL (VITAMIN D3) 125 MCG
50000 TABLET ORAL DAILY
COMMUNITY
End: 2019-02-21 | Stop reason: CLARIF

## 2019-02-21 RX ORDER — GLYBURIDE 5 MG/1
1 TABLET ORAL DAILY
COMMUNITY
End: 2019-10-08 | Stop reason: SDUPTHER

## 2019-02-21 NOTE — PROGRESS NOTES
Jason You is a 82 y o  male  Chief Complaint   Patient presents with    Cerebrovascular Accident       Assessment:  1  History of CVA (cerebrovascular accident)          Discussion:  Patient is doing well from his CVA, he was advised to continue with his Plavix, he will discuss with his family physician and decrease the aspirin to 81 mg, also was advised to keep his blood pressure cholesterol and sugar under control, stroke education given to the patient, patient is not keen to see a vascular surgeon, to go to the hospital if has any worsening symptoms and call me otherwise to see me back in 6 months and follow up with his other physicians  Subjective:    HPI   Patient is here in follow-up for his history of CVA, since his last visit he is doing good except that sometimes he occasionally gets short of breath for which he is going to follow up with his family physician, he denies having any headaches no dizziness no vision difficulty, no speech difficulty, no motor or sensory symptoms in upper or lower extremity, he lives with his girlfriend, he is able to do his ADLs, no other complaints      Vitals:    02/21/19 1519   BP: 142/82   BP Location: Left arm   Patient Position: Sitting   Cuff Size: Adult   Pulse: (!) 52   Weight: 100 kg (220 lb 9 6 oz)   Height: 5' 8 5" (1 74 m)       Current Medications    Current Outpatient Medications:     amLODIPine (NORVASC) 10 mg tablet, Take 1 tablet (10 mg total) by mouth daily, Disp: 90 tablet, Rfl: 1    aspirin 325 mg tablet, Take 325 mg by mouth every morning, Disp: , Rfl:     Blood Glucose Monitoring Suppl (ONE TOUCH ULTRA MINI) w/Device KIT, by Does not apply route, Disp: , Rfl:     cholecalciferol (VITAMIN D3) 1,000 units tablet, Take 1,000 Units by mouth daily, Disp: , Rfl:     clopidogrel (PLAVIX) 75 mg tablet, Take 1 tablet (75 mg total) by mouth daily, Disp: 90 tablet, Rfl: 1    Cyanocobalamin ER (RA VITAMIN B-12) 1000 MCG TBCR, Take 2 tablets by mouth daily at bedtime  , Disp: , Rfl:     doxazosin (CARDURA) 4 mg tablet, Take 1 tablet (4 mg total) by mouth every morning, Disp: 90 tablet, Rfl: 3    gabapentin (NEURONTIN) 100 mg capsule, Take 1 capsule (100 mg total) by mouth 2 (two) times a day, Disp: 180 capsule, Rfl: 0    glimepiride (AMARYL) 2 mg tablet, Take 1 tablet (2 mg total) by mouth daily with breakfast, Disp: 90 tablet, Rfl: 3    glucose blood (ONE TOUCH ULTRA TEST) test strip, 1 each by Other route 2 (two) times a day Use as instructed, Disp: 100 each, Rfl: 5    glyBURIDE (DIABETA) 5 mg tablet, Take 1 tablet by mouth daily, Disp: , Rfl:     lisinopril (ZESTRIL) 40 mg tablet, Take 1 tablet (40 mg total) by mouth daily, Disp: 90 tablet, Rfl: 1    metoprolol tartrate (LOPRESSOR) 100 mg tablet, Take half a tab twice daily, Disp: 90 tablet, Rfl: 3    Omega-3 Fatty Acids (FISH OIL PO), Take 1 capsule by mouth Daily, Disp: , Rfl:     pyridoxine (RA VITAMIN B-6) 100 MG tablet, Take 1 tablet by mouth daily, Disp: , Rfl:       Allergies  Celecoxib;  Hydromorphone; Pravastatin; and Statins    Past Medical History  Past Medical History:   Diagnosis Date    Acute ST elevation myocardial infarction [facility])     last assessed: 04/15/2016    Aortic valve disorder     Benign prostatic hyperplasia with lower urinary tract symptoms     CAD (coronary artery disease)     DM2 (diabetes mellitus, type 2) ([facility] [address] )     History of colonoscopy     resolved: 05/08/2012    History of transfusion     History of transient cerebral ischemia     HLD (hyperlipidemia)     HTN (hypertension)     Neuralgia          Past Surgical History:  Past Surgical History:   Procedure Laterality Date    CARDIAC CATHETERIZATION      Outcome: successful; last assessed: 02/03/2015    CORONARY ANGIOPLASTY WITH STENT PLACEMENT  2008    stent to LAD     HAND SURGERY      thumb    HIP HARDWARE REMOVAL      TOTAL HIP ARTHROPLASTY Right     TOTAL HIP ARTHROPLASTY Bilateral          Family History:  Family History   Problem Relation Age of Onset    Emphysema Father     Emphysema Sister        Social History:   reports that he has never smoked  He has never used smokeless tobacco  He reports that he does not drink alcohol or use drugs  Objective:    Physical Exam    Neurological Exam      GENERAL:  Cooperative in no acute distress  Well-developed and well-nourished    HEAD and NECK   Head is atraumatic normocephalic with no lesions or masses  Neck is supple with full range of motion    CARDIOVASCULAR  Carotid Arteries-no carotid bruits  NEUROLOGIC:  Mental Status-the patient is awake alert and oriented without aphasia or apraxia  Cranial Nerves: Visual fields are full to confrontation  Extraocular movements are full without nystagmus  Pupils are 2-1/2 mm and reactive  Face is symmetrical to light touch  Movements of facial expression move symmetrically  Hearing is normal to finger rub bilaterally  Soft palate lifts symmetrically  Shoulder shrug is symmetrical  Tongue is midline without atrophy  Motor: No drift is noted on arm extension  Strength is full in the upper and lower extremities with normal bulk and tone  Sensory:   Decreased light touch pinprick temperature sensation in a stocking distribution  Coordination: Finger to nose testing is performed accurately  Gait has a slightly stooped posture  Reflexes:        1+ and symmetrical        ROS:  Review of Systems   Constitutional: Negative  HENT:        Sinus drainage   Eyes: Negative  Respiratory: Positive for shortness of breath  Cardiovascular: Negative  Gastrointestinal: Negative  Endocrine: Negative  Genitourinary: Positive for frequency and urgency  Nocturia    Musculoskeletal: Positive for back pain (low back ), gait problem and neck pain  Skin: Negative  Allergic/Immunologic: Negative  Hematological: Negative      Psychiatric/Behavioral: Positive for sleep disturbance (staying asleep due to nocturia )

## 2019-03-04 ENCOUNTER — OFFICE VISIT (OUTPATIENT)
Dept: INTERNAL MEDICINE CLINIC | Facility: CLINIC | Age: 84
End: 2019-03-04
Payer: MEDICARE

## 2019-03-04 VITALS
OXYGEN SATURATION: 97 % | WEIGHT: 222.4 LBS | HEIGHT: 69 IN | HEART RATE: 62 BPM | SYSTOLIC BLOOD PRESSURE: 140 MMHG | BODY MASS INDEX: 32.94 KG/M2 | DIASTOLIC BLOOD PRESSURE: 70 MMHG

## 2019-03-04 DIAGNOSIS — I48.91 ATRIAL FIBRILLATION, UNSPECIFIED TYPE (HCC): ICD-10-CM

## 2019-03-04 DIAGNOSIS — E11.8 TYPE 2 DIABETES MELLITUS WITH COMPLICATION, WITHOUT LONG-TERM CURRENT USE OF INSULIN (HCC): ICD-10-CM

## 2019-03-04 DIAGNOSIS — I63.9 CEREBROVASCULAR ACCIDENT (CVA), UNSPECIFIED MECHANISM (HCC): Primary | ICD-10-CM

## 2019-03-04 DIAGNOSIS — N18.30 STAGE 3 CHRONIC KIDNEY DISEASE (HCC): ICD-10-CM

## 2019-03-04 DIAGNOSIS — Z86.73 HISTORY OF CVA (CEREBROVASCULAR ACCIDENT): ICD-10-CM

## 2019-03-04 DIAGNOSIS — R06.02 SHORTNESS OF BREATH: ICD-10-CM

## 2019-03-04 DIAGNOSIS — I35.9 AORTIC VALVE DISORDER: ICD-10-CM

## 2019-03-04 PROCEDURE — 99215 OFFICE O/P EST HI 40 MIN: CPT | Performed by: INTERNAL MEDICINE

## 2019-03-05 NOTE — PROGRESS NOTES
Assessment/Plan:  Regarding his back pain that needs to be on hold  I talked to his pain management doctor  To stop his Plavix somewhat risky  He does have a stent in  He will discuss this with Cardiology  In addition had a stroke with stenosis of a a middle cerebral artery  For that reason he will need to discuss discontinuation even for brief period of time with his neurologist     His blood sugars are said to be under control  Because of the shortness of breath I am going to order an echo and labs  He could be that the shortness of breath is related to decreased ventricular function however this will be addressed by Cardiology  Will also do a chest x-ray because of the shortness of breath  I will see him back here in 2 weeks  The 40 minutes spent with this patient  No results found for this or any previous visit (from the past 1008 hour(s))  1  Cerebrovascular accident (CVA), unspecified mechanism ([facility] [address] )     2  Atrial fibrillation, unspecified type (HCC)     3  Stage 3 chronic kidney disease ([facility] )  Comprehensive metabolic panel   4  History of CVA (cerebrovascular accident)     5  Type 2 diabetes mellitus with complication, without long-term current use of insulin ([facility])  HEMOGLOBIN A1C W/ EAG ESTIMATION   6  Aortic valve disorder  Echo complete with contrast if indicated   7  Shortness of breath  CBC and differential    XR chest pa & lateral       Orders Placed This Encounter   Procedures    XR chest pa & lateral    CBC and differential    Comprehensive metabolic panel    HEMOGLOBIN A1C W/ EAG ESTIMATION    Echo complete with contrast if indicated         Subjective:  Problems include 1  Back pain 2  Coronary artery disease 3  History of stroke 4  Type 2 diabetes 5  Status post coronary artery stenting 6  Overweight      Patient ID: Ijeoma Cantor is a 82 y o  male  HPI  He comes in for follow-up  He has been having considerable back pain    He did see the back pain specialist who told him he should consider injection therapy but Plavix would have to be discontinued for 3-5 days  He has a history of stenting but that was about 8 years ago  He had a stroke in the last year  He has stenosis of his middle cerebral artery  He has actually been doing fairly well  Blood sugars are said to be under control  He did bring a list of blood sugars  There is some variability but basically stable  Blood pressure is stable  He has not had any angina       His chief complaint today is of increased shortness of breath  He says that he used to be able to walk better but now has to stop and rest   He is to see Cardiology in the next month or so  The following portions of the patient's history were reviewed and updated as appropriate:   He has a past medical history of Acute ST elevation myocardial infarction [facility]), Aortic valve disorder, Benign prostatic hyperplasia with lower urinary tract symptoms, CAD (coronary artery disease), DM2 (diabetes mellitus, type 2) ([facility] [address] ), History of colonoscopy, History of transfusion, History of transient cerebral ischemia, HLD (hyperlipidemia), HTN (hypertension), and Neuralgia ,  does not have any pertinent problems on file  ,   has a past surgical history that includes Total hip arthroplasty (Right); Hip hardware removal; Cardiac catheterization; Coronary angioplasty with stent (2008); Hand surgery; and Total hip arthroplasty (Bilateral)  ,  family history includes Emphysema in his father and sister  ,   reports that he has never smoked  He has never used smokeless tobacco  He reports that he does not drink alcohol or use drugs  ,  is allergic to celecoxib; hydromorphone; pravastatin; and statins       Current Outpatient Medications:     amLODIPine (NORVASC) 10 mg tablet, Take 1 tablet (10 mg total) by mouth daily, Disp: 90 tablet, Rfl: 1    aspirin 325 mg tablet, Take 325 mg by mouth every morning, Disp: , Rfl:     Blood Glucose Monitoring Suppl (ONE TOUCH ULTRA MINI) w/Device KIT, by Does not apply route, Disp: , Rfl:     cholecalciferol (VITAMIN D3) 1,000 units tablet, Take 1,000 Units by mouth daily, Disp: , Rfl:     clopidogrel (PLAVIX) 75 mg tablet, Take 1 tablet (75 mg total) by mouth daily, Disp: 90 tablet, Rfl: 1    Cyanocobalamin ER (RA VITAMIN B-12) 1000 MCG TBCR, Take 2 tablets by mouth daily at bedtime  , Disp: , Rfl:     doxazosin (CARDURA) 4 mg tablet, Take 1 tablet (4 mg total) by mouth every morning, Disp: 90 tablet, Rfl: 3    gabapentin (NEURONTIN) 100 mg capsule, Take 1 capsule (100 mg total) by mouth 2 (two) times a day, Disp: 180 capsule, Rfl: 0    glimepiride (AMARYL) 2 mg tablet, Take 1 tablet (2 mg total) by mouth daily with breakfast, Disp: 90 tablet, Rfl: 3    glucose blood (ONE TOUCH ULTRA TEST) test strip, 1 each by Other route 2 (two) times a day Use as instructed, Disp: 100 each, Rfl: 5    glyBURIDE (DIABETA) 5 mg tablet, Take 1 tablet by mouth daily, Disp: , Rfl:     lisinopril (ZESTRIL) 40 mg tablet, Take 1 tablet (40 mg total) by mouth daily, Disp: 90 tablet, Rfl: 1    metoprolol tartrate (LOPRESSOR) 100 mg tablet, Take half a tab twice daily, Disp: 90 tablet, Rfl: 3    Omega-3 Fatty Acids (FISH OIL PO), Take 1 capsule by mouth Daily, Disp: , Rfl:     pyridoxine (RA VITAMIN B-6) 100 MG tablet, Take 1 tablet by mouth daily, Disp: , Rfl:     Review of Systems   Constitutional: Positive for activity change and fatigue  Negative for appetite change, chills, diaphoresis, fever and unexpected weight change  HENT: Positive for hearing loss  Negative for congestion, ear pain, mouth sores, nosebleeds, postnasal drip, sinus pressure, sinus pain, sore throat and trouble swallowing  Eyes: Positive for visual disturbance  Negative for pain and discharge  Respiratory: Positive for shortness of breath  Negative for apnea, cough, chest tightness and wheezing  Cardiovascular: Negative for chest pain, palpitations and leg swelling  Positive history of coronary artery disease  Increased shortness of breath recently  Gastrointestinal: Negative for abdominal pain, anal bleeding, blood in stool, constipation, diarrhea, nausea and vomiting  Patient remains overweight  Endocrine: Negative for polydipsia and polyphagia  Positive history of type 2 diabetes  Genitourinary: Negative for decreased urine volume, dysuria, flank pain, frequency, hematuria and urgency  Musculoskeletal: Negative for arthralgias, back pain, gait problem, joint swelling and myalgias  Skin: Negative for rash and wound  Allergic/Immunologic: Negative for environmental allergies and food allergies  Neurological: Negative for dizziness, tremors, seizures, syncope, speech difficulty, light-headedness, numbness and headaches  Stroke symptoms have stabilized  Gait is good  agility D has decreased  No unilateral weaknesses  Hematological: Negative for adenopathy  Does not bruise/bleed easily  Psychiatric/Behavioral: Negative for agitation, confusion, hallucinations, sleep disturbance and suicidal ideas  The patient is not nervous/anxious  Objective:  /70   Pulse 62   Ht 5' 8 5" (1 74 m)   Wt 101 kg (222 lb 6 4 oz)   SpO2 97%   BMI 33 32 kg/m²      Physical Exam   Constitutional: He appears well-developed  No distress  Overweight male in no acute distress  Blood pressure is 140/70  Pulse ox is 97  HENT:   Head: Normocephalic  Right Ear: External ear normal    Left Ear: External ear normal    Nose: Nose normal    Mouth/Throat: Oropharynx is clear and moist  No oropharyngeal exudate  Eyes: Pupils are equal, round, and reactive to light  Conjunctivae and EOM are normal  Right eye exhibits no discharge  Left eye exhibits no discharge  Neck: Normal range of motion  Neck supple  No thyromegaly present  Cardiovascular: Normal rate, regular rhythm, normal heart sounds and intact distal pulses   Exam reveals no gallop and no friction rub  No murmur heard  Rhythm is regular the present time  Rate is 70s  Pulmonary/Chest: Effort normal  No respiratory distress  He has no wheezes  He has no rales  Breath sounds somewhat diminished  Abdominal: Soft  Bowel sounds are normal  He exhibits no distension and no mass  There is no tenderness  There is no rebound and no guarding  Musculoskeletal: Normal range of motion  He exhibits no edema, tenderness or deformity  Lymphadenopathy:     He has no cervical adenopathy  Neurological: He is alert  He has normal reflexes  He displays normal reflexes  No cranial nerve deficit  Coordination normal    Skin: Skin is warm and dry  No rash noted  No erythema  Psychiatric: His behavior is normal  Judgment and thought content normal    He has a somewhat depressed affect  Nursing note and vitals reviewed

## 2019-03-08 ENCOUNTER — APPOINTMENT (OUTPATIENT)
Dept: LAB | Facility: CLINIC | Age: 84
End: 2019-03-08
Payer: MEDICARE

## 2019-03-08 DIAGNOSIS — R06.02 SHORTNESS OF BREATH: ICD-10-CM

## 2019-03-08 DIAGNOSIS — E11.8 TYPE 2 DIABETES MELLITUS WITH COMPLICATION, WITHOUT LONG-TERM CURRENT USE OF INSULIN (HCC): ICD-10-CM

## 2019-03-08 DIAGNOSIS — N18.30 STAGE 3 CHRONIC KIDNEY DISEASE (HCC): ICD-10-CM

## 2019-03-08 LAB
ALBUMIN SERPL BCP-MCNC: 3.6 G/DL (ref 3.5–5)
ALP SERPL-CCNC: 58 U/L (ref 46–116)
ALT SERPL W P-5'-P-CCNC: 20 U/L (ref 12–78)
ANION GAP SERPL CALCULATED.3IONS-SCNC: 6 MMOL/L (ref 4–13)
AST SERPL W P-5'-P-CCNC: 19 U/L (ref 5–45)
BASOPHILS # BLD AUTO: 0.04 THOUSANDS/ΜL (ref 0–0.1)
BASOPHILS NFR BLD AUTO: 1 % (ref 0–1)
BILIRUB SERPL-MCNC: 0.62 MG/DL (ref 0.2–1)
BUN SERPL-MCNC: 30 MG/DL (ref 5–25)
CALCIUM SERPL-MCNC: 8.7 MG/DL (ref 8.3–10.1)
CHLORIDE SERPL-SCNC: 104 MMOL/L (ref 100–108)
CO2 SERPL-SCNC: 29 MMOL/L (ref 21–32)
CREAT SERPL-MCNC: 1.64 MG/DL (ref 0.6–1.3)
EOSINOPHIL # BLD AUTO: 0.42 THOUSAND/ΜL (ref 0–0.61)
EOSINOPHIL NFR BLD AUTO: 7 % (ref 0–6)
ERYTHROCYTE [DISTWIDTH] IN BLOOD BY AUTOMATED COUNT: 13 % (ref 11.6–15.1)
EST. AVERAGE GLUCOSE BLD GHB EST-MCNC: 128 MG/DL
GFR SERPL CREATININE-BSD FRML MDRD: 39 ML/MIN/1.73SQ M
GLUCOSE P FAST SERPL-MCNC: 109 MG/DL (ref 65–99)
HBA1C MFR BLD: 6.1 % (ref 4.2–6.3)
HCT VFR BLD AUTO: 42.2 % (ref 36.5–49.3)
HGB BLD-MCNC: 13.6 G/DL (ref 12–17)
IMM GRANULOCYTES # BLD AUTO: 0 THOUSAND/UL (ref 0–0.2)
IMM GRANULOCYTES NFR BLD AUTO: 0 % (ref 0–2)
LYMPHOCYTES # BLD AUTO: 2.06 THOUSANDS/ΜL (ref 0.6–4.47)
LYMPHOCYTES NFR BLD AUTO: 32 % (ref 14–44)
MCH RBC QN AUTO: 31.3 PG (ref 26.8–34.3)
MCHC RBC AUTO-ENTMCNC: 32.2 G/DL (ref 31.4–37.4)
MCV RBC AUTO: 97 FL (ref 82–98)
MONOCYTES # BLD AUTO: 0.52 THOUSAND/ΜL (ref 0.17–1.22)
MONOCYTES NFR BLD AUTO: 8 % (ref 4–12)
NEUTROPHILS # BLD AUTO: 3.37 THOUSANDS/ΜL (ref 1.85–7.62)
NEUTS SEG NFR BLD AUTO: 52 % (ref 43–75)
NRBC BLD AUTO-RTO: 0 /100 WBCS
PLATELET # BLD AUTO: 182 THOUSANDS/UL (ref 149–390)
PMV BLD AUTO: 10 FL (ref 8.9–12.7)
POTASSIUM SERPL-SCNC: 4.5 MMOL/L (ref 3.5–5.3)
PROT SERPL-MCNC: 7.6 G/DL (ref 6.4–8.2)
RBC # BLD AUTO: 4.34 MILLION/UL (ref 3.88–5.62)
SODIUM SERPL-SCNC: 139 MMOL/L (ref 136–145)
WBC # BLD AUTO: 6.41 THOUSAND/UL (ref 4.31–10.16)

## 2019-03-08 PROCEDURE — 83036 HEMOGLOBIN GLYCOSYLATED A1C: CPT

## 2019-03-08 PROCEDURE — 36415 COLL VENOUS BLD VENIPUNCTURE: CPT

## 2019-03-08 PROCEDURE — 85025 COMPLETE CBC W/AUTO DIFF WBC: CPT

## 2019-03-08 PROCEDURE — 80053 COMPREHEN METABOLIC PANEL: CPT

## 2019-03-27 ENCOUNTER — HOSPITAL ENCOUNTER (OUTPATIENT)
Dept: RADIOLOGY | Facility: HOSPITAL | Age: 84
Discharge: HOME | End: 2019-03-27
Attending: INTERNAL MEDICINE
Payer: MEDICARE

## 2019-03-27 DIAGNOSIS — R06.02 SHORTNESS OF BREATH: ICD-10-CM

## 2019-03-27 PROCEDURE — 71046 X-RAY EXAM CHEST 2 VIEWS: CPT

## 2019-04-01 ENCOUNTER — OFFICE VISIT (OUTPATIENT)
Dept: CARDIOLOGY CLINIC | Facility: CLINIC | Age: 84
End: 2019-04-01
Payer: MEDICARE

## 2019-04-01 VITALS
DIASTOLIC BLOOD PRESSURE: 72 MMHG | BODY MASS INDEX: 32.53 KG/M2 | HEART RATE: 64 BPM | SYSTOLIC BLOOD PRESSURE: 146 MMHG | WEIGHT: 219.6 LBS | HEIGHT: 69 IN | OXYGEN SATURATION: 96 %

## 2019-04-01 DIAGNOSIS — Z51.81 ENCOUNTER FOR MONITORING ANTIPLATELET THERAPY: ICD-10-CM

## 2019-04-01 DIAGNOSIS — I35.0 NONRHEUMATIC AORTIC VALVE STENOSIS: ICD-10-CM

## 2019-04-01 DIAGNOSIS — Z79.02 ENCOUNTER FOR MONITORING ANTIPLATELET THERAPY: ICD-10-CM

## 2019-04-01 DIAGNOSIS — I25.10 CORONARY ARTERY DISEASE INVOLVING NATIVE CORONARY ARTERY OF NATIVE HEART WITHOUT ANGINA PECTORIS: Primary | ICD-10-CM

## 2019-04-01 DIAGNOSIS — R06.02 SHORTNESS OF BREATH: ICD-10-CM

## 2019-04-01 DIAGNOSIS — E78.2 MIXED HYPERLIPIDEMIA: ICD-10-CM

## 2019-04-01 PROCEDURE — 99214 OFFICE O/P EST MOD 30 MIN: CPT | Performed by: INTERNAL MEDICINE

## 2019-04-01 RX ORDER — ASPIRIN 81 MG/1
TABLET ORAL
Qty: 30 TABLET | Refills: 4
Start: 2019-04-01 | End: 2020-07-14 | Stop reason: ALTCHOICE

## 2019-04-05 ENCOUNTER — HOSPITAL ENCOUNTER (OUTPATIENT)
Dept: NON INVASIVE DIAGNOSTICS | Facility: CLINIC | Age: 84
Discharge: HOME | End: 2019-04-05
Payer: MEDICARE

## 2019-04-05 ENCOUNTER — TELEPHONE (OUTPATIENT)
Dept: CARDIOLOGY CLINIC | Facility: CLINIC | Age: 84
End: 2019-04-05

## 2019-04-05 DIAGNOSIS — I25.10 CORONARY ARTERY DISEASE INVOLVING NATIVE CORONARY ARTERY OF NATIVE HEART WITHOUT ANGINA PECTORIS: ICD-10-CM

## 2019-04-05 DIAGNOSIS — R06.02 SHORTNESS OF BREATH: ICD-10-CM

## 2019-04-05 DIAGNOSIS — I35.0 NONRHEUMATIC AORTIC VALVE STENOSIS: ICD-10-CM

## 2019-04-05 PROCEDURE — A9502 TC99M TETROFOSMIN: HCPCS

## 2019-04-05 PROCEDURE — 93016 CV STRESS TEST SUPVJ ONLY: CPT | Performed by: INTERNAL MEDICINE

## 2019-04-05 PROCEDURE — 93018 CV STRESS TEST I&R ONLY: CPT | Performed by: INTERNAL MEDICINE

## 2019-04-05 PROCEDURE — 93306 TTE W/DOPPLER COMPLETE: CPT

## 2019-04-05 PROCEDURE — 78452 HT MUSCLE IMAGE SPECT MULT: CPT | Performed by: INTERNAL MEDICINE

## 2019-04-05 PROCEDURE — 78452 HT MUSCLE IMAGE SPECT MULT: CPT

## 2019-04-05 PROCEDURE — 93306 TTE W/DOPPLER COMPLETE: CPT | Performed by: INTERNAL MEDICINE

## 2019-04-05 PROCEDURE — 93017 CV STRESS TEST TRACING ONLY: CPT

## 2019-04-05 RX ADMIN — REGADENOSON 0.4 MG: 0.08 INJECTION, SOLUTION INTRAVENOUS at 08:53

## 2019-04-08 LAB
MAX DIASTOLIC BP: 64 MMHG
MAX HEART RATE: 71 BPM
MAX PREDICTED HEART RATE: 139 BPM
MAX. SYSTOLIC BP: 138 MMHG
PROTOCOL NAME: NORMAL
REASON FOR TERMINATION: NORMAL
TARGET HR FORMULA: NORMAL
TIME IN EXERCISE PHASE: NORMAL

## 2019-04-10 ENCOUNTER — APPOINTMENT (OUTPATIENT)
Dept: LAB | Facility: CLINIC | Age: 84
End: 2019-04-10
Payer: MEDICARE

## 2019-04-10 ENCOUNTER — OFFICE VISIT (OUTPATIENT)
Dept: INTERNAL MEDICINE CLINIC | Facility: CLINIC | Age: 84
End: 2019-04-10
Payer: MEDICARE

## 2019-04-10 VITALS
SYSTOLIC BLOOD PRESSURE: 158 MMHG | OXYGEN SATURATION: 95 % | HEIGHT: 69 IN | DIASTOLIC BLOOD PRESSURE: 78 MMHG | TEMPERATURE: 98.1 F | WEIGHT: 219.2 LBS | HEART RATE: 60 BPM | BODY MASS INDEX: 32.47 KG/M2

## 2019-04-10 DIAGNOSIS — I82.622 ACUTE DEEP VEIN THROMBOSIS (DVT) OF BRACHIAL VEIN OF LEFT UPPER EXTREMITY (HCC): ICD-10-CM

## 2019-04-10 DIAGNOSIS — I69.354 HEMIPLEGIA AND HEMIPARESIS FOLLOWING CEREBRAL INFARCTION AFFECTING LEFT NON-DOMINANT SIDE (HCC): ICD-10-CM

## 2019-04-10 DIAGNOSIS — R35.0 FREQUENCY OF MICTURITION: ICD-10-CM

## 2019-04-10 DIAGNOSIS — E11.8 TYPE 2 DIABETES MELLITUS WITH COMPLICATION, WITHOUT LONG-TERM CURRENT USE OF INSULIN (HCC): ICD-10-CM

## 2019-04-10 DIAGNOSIS — R10.9 FLANK PAIN: ICD-10-CM

## 2019-04-10 DIAGNOSIS — I10 ESSENTIAL HYPERTENSION: Primary | ICD-10-CM

## 2019-04-10 LAB
ANION GAP SERPL CALCULATED.3IONS-SCNC: 2 MMOL/L (ref 4–13)
BACTERIA UR QL AUTO: ABNORMAL /HPF
BASOPHILS # BLD AUTO: 0.04 THOUSANDS/ΜL (ref 0–0.1)
BASOPHILS NFR BLD AUTO: 1 % (ref 0–1)
BILIRUB UR QL STRIP: ABNORMAL
BUN SERPL-MCNC: 32 MG/DL (ref 5–25)
CALCIUM SERPL-MCNC: 8.8 MG/DL (ref 8.3–10.1)
CHLORIDE SERPL-SCNC: 104 MMOL/L (ref 100–108)
CLARITY UR: CLEAR
CO2 SERPL-SCNC: 29 MMOL/L (ref 21–32)
COLOR UR: ABNORMAL
CREAT SERPL-MCNC: 1.86 MG/DL (ref 0.6–1.3)
EOSINOPHIL # BLD AUTO: 0.26 THOUSAND/ΜL (ref 0–0.61)
EOSINOPHIL NFR BLD AUTO: 4 % (ref 0–6)
ERYTHROCYTE [DISTWIDTH] IN BLOOD BY AUTOMATED COUNT: 12.9 % (ref 11.6–15.1)
GFR SERPL CREATININE-BSD FRML MDRD: 33 ML/MIN/1.73SQ M
GLUCOSE P FAST SERPL-MCNC: 124 MG/DL (ref 65–99)
GLUCOSE UR STRIP-MCNC: NEGATIVE MG/DL
HCT VFR BLD AUTO: 41.3 % (ref 36.5–49.3)
HGB BLD-MCNC: 13.4 G/DL (ref 12–17)
HGB UR QL STRIP.AUTO: NEGATIVE
HYALINE CASTS #/AREA URNS LPF: ABNORMAL /LPF
IMM GRANULOCYTES # BLD AUTO: 0.01 THOUSAND/UL (ref 0–0.2)
IMM GRANULOCYTES NFR BLD AUTO: 0 % (ref 0–2)
KETONES UR STRIP-MCNC: ABNORMAL MG/DL
LEUKOCYTE ESTERASE UR QL STRIP: NEGATIVE
LYMPHOCYTES # BLD AUTO: 1.68 THOUSANDS/ΜL (ref 0.6–4.47)
LYMPHOCYTES NFR BLD AUTO: 28 % (ref 14–44)
MCH RBC QN AUTO: 31.6 PG (ref 26.8–34.3)
MCHC RBC AUTO-ENTMCNC: 32.4 G/DL (ref 31.4–37.4)
MCV RBC AUTO: 97 FL (ref 82–98)
MONOCYTES # BLD AUTO: 0.41 THOUSAND/ΜL (ref 0.17–1.22)
MONOCYTES NFR BLD AUTO: 7 % (ref 4–12)
NEUTROPHILS # BLD AUTO: 3.72 THOUSANDS/ΜL (ref 1.85–7.62)
NEUTS SEG NFR BLD AUTO: 60 % (ref 43–75)
NITRITE UR QL STRIP: NEGATIVE
NON-SQ EPI CELLS URNS QL MICRO: ABNORMAL /HPF
NRBC BLD AUTO-RTO: 0 /100 WBCS
PH UR STRIP.AUTO: 6 [PH]
PLATELET # BLD AUTO: 199 THOUSANDS/UL (ref 149–390)
PMV BLD AUTO: 10.2 FL (ref 8.9–12.7)
POTASSIUM SERPL-SCNC: 4.7 MMOL/L (ref 3.5–5.3)
PROT UR STRIP-MCNC: ABNORMAL MG/DL
RBC # BLD AUTO: 4.24 MILLION/UL (ref 3.88–5.62)
RBC #/AREA URNS AUTO: ABNORMAL /HPF
SODIUM SERPL-SCNC: 135 MMOL/L (ref 136–145)
SP GR UR STRIP.AUTO: 1.02 (ref 1–1.03)
UROBILINOGEN UR QL STRIP.AUTO: 1 E.U./DL
WBC # BLD AUTO: 6.12 THOUSAND/UL (ref 4.31–10.16)
WBC #/AREA URNS AUTO: ABNORMAL /HPF

## 2019-04-10 PROCEDURE — 81001 URINALYSIS AUTO W/SCOPE: CPT | Performed by: PHYSICIAN ASSISTANT

## 2019-04-10 PROCEDURE — 36415 COLL VENOUS BLD VENIPUNCTURE: CPT

## 2019-04-10 PROCEDURE — 85025 COMPLETE CBC W/AUTO DIFF WBC: CPT

## 2019-04-10 PROCEDURE — 80048 BASIC METABOLIC PNL TOTAL CA: CPT

## 2019-04-10 PROCEDURE — 99214 OFFICE O/P EST MOD 30 MIN: CPT | Performed by: PHYSICIAN ASSISTANT

## 2019-04-15 ENCOUNTER — TELEPHONE (OUTPATIENT)
Dept: INTERNAL MEDICINE CLINIC | Facility: CLINIC | Age: 84
End: 2019-04-15

## 2019-04-16 ENCOUNTER — HOSPITAL ENCOUNTER (OUTPATIENT)
Dept: CT IMAGING | Facility: CLINIC | Age: 84
Discharge: HOME | End: 2019-04-16
Payer: MEDICARE

## 2019-04-16 DIAGNOSIS — R35.0 FREQUENCY OF MICTURITION: ICD-10-CM

## 2019-04-16 DIAGNOSIS — R10.9 FLANK PAIN: ICD-10-CM

## 2019-04-16 PROCEDURE — 74176 CT ABD & PELVIS W/O CONTRAST: CPT

## 2019-04-27 ENCOUNTER — OFFICE VISIT (OUTPATIENT)
Dept: INTERNAL MEDICINE CLINIC | Facility: CLINIC | Age: 84
End: 2019-04-27
Payer: MEDICARE

## 2019-04-27 VITALS
BODY MASS INDEX: 32.47 KG/M2 | OXYGEN SATURATION: 96 % | SYSTOLIC BLOOD PRESSURE: 140 MMHG | HEIGHT: 69 IN | HEART RATE: 56 BPM | DIASTOLIC BLOOD PRESSURE: 68 MMHG | WEIGHT: 219.2 LBS

## 2019-04-27 DIAGNOSIS — N18.30 STAGE 3 CHRONIC KIDNEY DISEASE (HCC): ICD-10-CM

## 2019-04-27 DIAGNOSIS — I25.10 CORONARY ARTERY DISEASE INVOLVING NATIVE CORONARY ARTERY OF NATIVE HEART WITHOUT ANGINA PECTORIS: ICD-10-CM

## 2019-04-27 DIAGNOSIS — I35.0 NONRHEUMATIC AORTIC VALVE STENOSIS: ICD-10-CM

## 2019-04-27 DIAGNOSIS — I63.9 CEREBROVASCULAR ACCIDENT (CVA), UNSPECIFIED MECHANISM (HCC): ICD-10-CM

## 2019-04-27 DIAGNOSIS — E11.9 TYPE 2 DIABETES MELLITUS WITHOUT COMPLICATION, WITHOUT LONG-TERM CURRENT USE OF INSULIN (HCC): Primary | ICD-10-CM

## 2019-04-27 DIAGNOSIS — I10 ESSENTIAL HYPERTENSION: ICD-10-CM

## 2019-04-27 PROCEDURE — 99214 OFFICE O/P EST MOD 30 MIN: CPT | Performed by: INTERNAL MEDICINE

## 2019-05-03 DIAGNOSIS — E11.8 TYPE 2 DIABETES MELLITUS WITH COMPLICATION, WITHOUT LONG-TERM CURRENT USE OF INSULIN (HCC): ICD-10-CM

## 2019-05-03 DIAGNOSIS — Z00.00 HEALTH CARE MAINTENANCE: ICD-10-CM

## 2019-05-03 DIAGNOSIS — G89.4 CHRONIC PAIN SYNDROME: ICD-10-CM

## 2019-05-03 RX ORDER — DOXAZOSIN MESYLATE 4 MG/1
4 TABLET ORAL EVERY MORNING
Qty: 90 TABLET | Refills: 0 | Status: SHIPPED | OUTPATIENT
Start: 2019-05-03 | End: 2019-07-19 | Stop reason: SDUPTHER

## 2019-05-03 RX ORDER — GABAPENTIN 100 MG/1
100 CAPSULE ORAL 2 TIMES DAILY
Qty: 180 CAPSULE | Refills: 3 | Status: SHIPPED | OUTPATIENT
Start: 2019-05-03 | End: 2020-03-30

## 2019-05-03 RX ORDER — GLIMEPIRIDE 2 MG/1
2 TABLET ORAL
Qty: 90 TABLET | Refills: 3 | Status: SHIPPED | OUTPATIENT
Start: 2019-05-03 | End: 2019-06-27 | Stop reason: ALTCHOICE

## 2019-05-30 ENCOUNTER — APPOINTMENT (OUTPATIENT)
Dept: LAB | Facility: CLINIC | Age: 84
End: 2019-05-30
Payer: MEDICARE

## 2019-05-30 DIAGNOSIS — N18.30 STAGE 3 CHRONIC KIDNEY DISEASE (HCC): ICD-10-CM

## 2019-05-30 LAB
ANION GAP SERPL CALCULATED.3IONS-SCNC: 5 MMOL/L (ref 4–13)
BUN SERPL-MCNC: 30 MG/DL (ref 5–25)
CALCIUM SERPL-MCNC: 8.4 MG/DL (ref 8.3–10.1)
CHLORIDE SERPL-SCNC: 106 MMOL/L (ref 100–108)
CO2 SERPL-SCNC: 26 MMOL/L (ref 21–32)
CREAT SERPL-MCNC: 1.66 MG/DL (ref 0.6–1.3)
GFR SERPL CREATININE-BSD FRML MDRD: 38 ML/MIN/1.73SQ M
GLUCOSE P FAST SERPL-MCNC: 133 MG/DL (ref 65–99)
POTASSIUM SERPL-SCNC: 4.4 MMOL/L (ref 3.5–5.3)
SODIUM SERPL-SCNC: 137 MMOL/L (ref 136–145)

## 2019-05-30 PROCEDURE — 36415 COLL VENOUS BLD VENIPUNCTURE: CPT

## 2019-05-30 PROCEDURE — 80048 BASIC METABOLIC PNL TOTAL CA: CPT

## 2019-06-13 DIAGNOSIS — E11.8 TYPE 2 DIABETES MELLITUS WITH COMPLICATION, WITHOUT LONG-TERM CURRENT USE OF INSULIN (HCC): ICD-10-CM

## 2019-06-14 ENCOUNTER — TELEPHONE (OUTPATIENT)
Dept: INTERNAL MEDICINE CLINIC | Facility: CLINIC | Age: 84
End: 2019-06-14

## 2019-06-17 ENCOUNTER — TELEPHONE (OUTPATIENT)
Dept: INTERNAL MEDICINE CLINIC | Facility: CLINIC | Age: 84
End: 2019-06-17

## 2019-06-18 ENCOUNTER — TELEPHONE (OUTPATIENT)
Dept: INTERNAL MEDICINE CLINIC | Facility: CLINIC | Age: 84
End: 2019-06-18

## 2019-06-25 ENCOUNTER — TELEPHONE (OUTPATIENT)
Dept: INTERNAL MEDICINE CLINIC | Facility: CLINIC | Age: 84
End: 2019-06-25

## 2019-06-27 ENCOUNTER — OFFICE VISIT (OUTPATIENT)
Dept: INTERNAL MEDICINE CLINIC | Facility: CLINIC | Age: 84
End: 2019-06-27
Payer: MEDICARE

## 2019-06-27 VITALS
WEIGHT: 220.4 LBS | BODY MASS INDEX: 32.64 KG/M2 | HEART RATE: 53 BPM | SYSTOLIC BLOOD PRESSURE: 154 MMHG | HEIGHT: 69 IN | OXYGEN SATURATION: 96 % | DIASTOLIC BLOOD PRESSURE: 68 MMHG

## 2019-06-27 DIAGNOSIS — I48.91 ATRIAL FIBRILLATION, UNSPECIFIED TYPE (HCC): ICD-10-CM

## 2019-06-27 DIAGNOSIS — N18.30 TYPE 2 DIABETES MELLITUS WITH STAGE 3 CHRONIC KIDNEY DISEASE, WITHOUT LONG-TERM CURRENT USE OF INSULIN (HCC): ICD-10-CM

## 2019-06-27 DIAGNOSIS — E11.22 TYPE 2 DIABETES MELLITUS WITH STAGE 3 CHRONIC KIDNEY DISEASE, WITHOUT LONG-TERM CURRENT USE OF INSULIN (HCC): ICD-10-CM

## 2019-06-27 DIAGNOSIS — E11.9 TYPE 2 DIABETES MELLITUS WITHOUT COMPLICATION, UNSPECIFIED WHETHER LONG TERM INSULIN USE (HCC): Primary | ICD-10-CM

## 2019-06-27 DIAGNOSIS — I25.10 CORONARY ARTERY DISEASE INVOLVING NATIVE CORONARY ARTERY OF NATIVE HEART WITHOUT ANGINA PECTORIS: ICD-10-CM

## 2019-06-27 DIAGNOSIS — I10 ESSENTIAL HYPERTENSION: ICD-10-CM

## 2019-06-27 DIAGNOSIS — I35.0 NONRHEUMATIC AORTIC VALVE STENOSIS: ICD-10-CM

## 2019-06-27 DIAGNOSIS — Z86.73 HISTORY OF CVA (CEREBROVASCULAR ACCIDENT): ICD-10-CM

## 2019-06-27 PROBLEM — E11.29 TYPE 2 DIABETES MELLITUS WITH KIDNEY COMPLICATION, WITHOUT LONG-TERM CURRENT USE OF INSULIN (HCC): Status: ACTIVE | Noted: 2019-06-27

## 2019-06-27 LAB
LEFT EYE DIABETIC RETINOPATHY: NORMAL
LEFT EYE IMAGE QUALITY: NORMAL
LEFT EYE MACULAR EDEMA: NORMAL
LEFT EYE OTHER RETINOPATHY: NORMAL
RIGHT EYE DIABETIC RETINOPATHY: NORMAL
RIGHT EYE IMAGE QUALITY: NORMAL
RIGHT EYE MACULAR EDEMA: NORMAL
RIGHT EYE OTHER RETINOPATHY: NORMAL
SEVERITY (EYE EXAM): NORMAL

## 2019-06-27 PROCEDURE — 99214 OFFICE O/P EST MOD 30 MIN: CPT | Performed by: INTERNAL MEDICINE

## 2019-06-27 PROCEDURE — 92250 FUNDUS PHOTOGRAPHY W/I&R: CPT | Performed by: INTERNAL MEDICINE

## 2019-07-01 DIAGNOSIS — E11.8 TYPE 2 DIABETES MELLITUS WITH COMPLICATION, WITHOUT LONG-TERM CURRENT USE OF INSULIN (HCC): ICD-10-CM

## 2019-07-01 NOTE — TELEPHONE ENCOUNTER
PT  MARCUS  RX REFILL    ONE TOUCH TEST  STRIPS   FORGOT  TO ASK  MARCUS  FOR  THEM  AT  HIS  APPT  LAST  [address] [facility] [address]  [phone]

## 2019-07-08 DIAGNOSIS — E11.8 TYPE 2 DIABETES MELLITUS WITH COMPLICATION, WITHOUT LONG-TERM CURRENT USE OF INSULIN (HCC): ICD-10-CM

## 2019-07-19 DIAGNOSIS — I10 ESSENTIAL HYPERTENSION: ICD-10-CM

## 2019-07-19 DIAGNOSIS — Z00.00 HEALTH CARE MAINTENANCE: ICD-10-CM

## 2019-07-22 RX ORDER — METOPROLOL TARTRATE 100 MG/1
TABLET ORAL
Qty: 90 TABLET | Refills: 3 | Status: SHIPPED | OUTPATIENT
Start: 2019-07-22 | End: 2019-11-26 | Stop reason: HOSPADM

## 2019-07-22 RX ORDER — AMLODIPINE BESYLATE 10 MG/1
TABLET ORAL
Qty: 90 TABLET | Refills: 1 | Status: SHIPPED | OUTPATIENT
Start: 2019-07-22 | End: 2020-01-16

## 2019-07-22 RX ORDER — DOXAZOSIN MESYLATE 4 MG/1
TABLET ORAL
Qty: 90 TABLET | Refills: 0 | Status: SHIPPED | OUTPATIENT
Start: 2019-07-22 | End: 2019-10-08 | Stop reason: SDUPTHER

## 2019-07-22 RX ORDER — LISINOPRIL 40 MG/1
TABLET ORAL
Qty: 90 TABLET | Refills: 1 | Status: SHIPPED | OUTPATIENT
Start: 2019-07-22 | End: 2019-10-25 | Stop reason: SDUPTHER

## 2019-07-25 ENCOUNTER — TELEPHONE (OUTPATIENT)
Dept: INTERNAL MEDICINE CLINIC | Facility: CLINIC | Age: 84
End: 2019-07-25

## 2019-07-25 NOTE — TELEPHONE ENCOUNTER
Patient called to let Dr Duke Hussein know that he is going to [facility] [address]  today at 2:30 to get the shots in his back due to severe back pain   He is requesting that Dr Duke Hussein calls him back at  [phone]

## 2019-09-09 ENCOUNTER — APPOINTMENT (OUTPATIENT)
Dept: LAB | Facility: CLINIC | Age: 84
End: 2019-09-09
Payer: MEDICARE

## 2019-09-09 DIAGNOSIS — I63.9 CEREBROVASCULAR ACCIDENT (CVA), UNSPECIFIED MECHANISM (HCC): ICD-10-CM

## 2019-09-09 DIAGNOSIS — E11.9 TYPE 2 DIABETES MELLITUS WITHOUT COMPLICATION, UNSPECIFIED WHETHER LONG TERM INSULIN USE (HCC): ICD-10-CM

## 2019-09-09 LAB
ALBUMIN SERPL BCP-MCNC: 3.9 G/DL (ref 3.5–5)
ALP SERPL-CCNC: 56 U/L (ref 46–116)
ALT SERPL W P-5'-P-CCNC: 20 U/L (ref 12–78)
ANION GAP SERPL CALCULATED.3IONS-SCNC: 6 MMOL/L (ref 4–13)
AST SERPL W P-5'-P-CCNC: 14 U/L (ref 5–45)
BASOPHILS # BLD AUTO: 0.06 THOUSANDS/ΜL (ref 0–0.1)
BASOPHILS NFR BLD AUTO: 1 % (ref 0–1)
BILIRUB SERPL-MCNC: 0.5 MG/DL (ref 0.2–1)
BUN SERPL-MCNC: 32 MG/DL (ref 5–25)
CALCIUM SERPL-MCNC: 9 MG/DL (ref 8.3–10.1)
CHLORIDE SERPL-SCNC: 105 MMOL/L (ref 100–108)
CHOLEST SERPL-MCNC: 175 MG/DL (ref 50–200)
CO2 SERPL-SCNC: 28 MMOL/L (ref 21–32)
CREAT SERPL-MCNC: 1.82 MG/DL (ref 0.6–1.3)
EOSINOPHIL # BLD AUTO: 0.32 THOUSAND/ΜL (ref 0–0.61)
EOSINOPHIL NFR BLD AUTO: 6 % (ref 0–6)
ERYTHROCYTE [DISTWIDTH] IN BLOOD BY AUTOMATED COUNT: 12.7 % (ref 11.6–15.1)
EST. AVERAGE GLUCOSE BLD GHB EST-MCNC: 137 MG/DL
GFR SERPL CREATININE-BSD FRML MDRD: 34 ML/MIN/1.73SQ M
GLUCOSE P FAST SERPL-MCNC: 111 MG/DL (ref 65–99)
HBA1C MFR BLD: 6.4 % (ref 4.2–6.3)
HCT VFR BLD AUTO: 41.7 % (ref 36.5–49.3)
HDLC SERPL-MCNC: 47 MG/DL (ref 40–60)
HGB BLD-MCNC: 13.3 G/DL (ref 12–17)
IMM GRANULOCYTES # BLD AUTO: 0.01 THOUSAND/UL (ref 0–0.2)
IMM GRANULOCYTES NFR BLD AUTO: 0 % (ref 0–2)
LDLC SERPL CALC-MCNC: 113 MG/DL (ref 0–100)
LYMPHOCYTES # BLD AUTO: 1.41 THOUSANDS/ΜL (ref 0.6–4.47)
LYMPHOCYTES NFR BLD AUTO: 25 % (ref 14–44)
MCH RBC QN AUTO: 31.2 PG (ref 26.8–34.3)
MCHC RBC AUTO-ENTMCNC: 31.9 G/DL (ref 31.4–37.4)
MCV RBC AUTO: 98 FL (ref 82–98)
MONOCYTES # BLD AUTO: 0.49 THOUSAND/ΜL (ref 0.17–1.22)
MONOCYTES NFR BLD AUTO: 9 % (ref 4–12)
NEUTROPHILS # BLD AUTO: 3.34 THOUSANDS/ΜL (ref 1.85–7.62)
NEUTS SEG NFR BLD AUTO: 59 % (ref 43–75)
NONHDLC SERPL-MCNC: 128 MG/DL
NRBC BLD AUTO-RTO: 0 /100 WBCS
PLATELET # BLD AUTO: 183 THOUSANDS/UL (ref 149–390)
PMV BLD AUTO: 9.9 FL (ref 8.9–12.7)
POTASSIUM SERPL-SCNC: 4.5 MMOL/L (ref 3.5–5.3)
PROT SERPL-MCNC: 7.5 G/DL (ref 6.4–8.2)
RBC # BLD AUTO: 4.26 MILLION/UL (ref 3.88–5.62)
SODIUM SERPL-SCNC: 139 MMOL/L (ref 136–145)
TRIGL SERPL-MCNC: 77 MG/DL
WBC # BLD AUTO: 5.63 THOUSAND/UL (ref 4.31–10.16)

## 2019-09-09 PROCEDURE — 85025 COMPLETE CBC W/AUTO DIFF WBC: CPT

## 2019-09-09 PROCEDURE — 83036 HEMOGLOBIN GLYCOSYLATED A1C: CPT

## 2019-09-09 PROCEDURE — 80053 COMPREHEN METABOLIC PANEL: CPT

## 2019-09-09 PROCEDURE — 80061 LIPID PANEL: CPT

## 2019-09-09 PROCEDURE — 36415 COLL VENOUS BLD VENIPUNCTURE: CPT

## 2019-09-10 ENCOUNTER — OFFICE VISIT (OUTPATIENT)
Dept: INTERNAL MEDICINE CLINIC | Facility: CLINIC | Age: 84
End: 2019-09-10
Payer: MEDICARE

## 2019-09-10 VITALS
HEART RATE: 52 BPM | OXYGEN SATURATION: 96 % | BODY MASS INDEX: 33.38 KG/M2 | WEIGHT: 225.4 LBS | DIASTOLIC BLOOD PRESSURE: 74 MMHG | SYSTOLIC BLOOD PRESSURE: 188 MMHG | HEIGHT: 69 IN

## 2019-09-10 DIAGNOSIS — I35.0 NONRHEUMATIC AORTIC VALVE STENOSIS: ICD-10-CM

## 2019-09-10 DIAGNOSIS — Z86.73 HISTORY OF CVA (CEREBROVASCULAR ACCIDENT): Primary | ICD-10-CM

## 2019-09-10 DIAGNOSIS — I10 POORLY-CONTROLLED HYPERTENSION: ICD-10-CM

## 2019-09-10 DIAGNOSIS — I25.10 CORONARY ARTERY DISEASE INVOLVING NATIVE CORONARY ARTERY OF NATIVE HEART WITHOUT ANGINA PECTORIS: ICD-10-CM

## 2019-09-10 DIAGNOSIS — N18.30 STAGE 3 CHRONIC KIDNEY DISEASE (HCC): ICD-10-CM

## 2019-09-10 PROCEDURE — 99214 OFFICE O/P EST MOD 30 MIN: CPT | Performed by: INTERNAL MEDICINE

## 2019-09-10 RX ORDER — CLOPIDOGREL BISULFATE 75 MG/1
TABLET ORAL
Qty: 90 TABLET | Refills: 1 | Status: SHIPPED | OUTPATIENT
Start: 2019-09-10 | End: 2020-01-06

## 2019-09-11 NOTE — PROGRESS NOTES
Assessment/Plan:  Labs reviewed with patient and his daughter  A1c stable at 6 4  Total cholesterol is 175  Creatinine has gone up to 1 82  He says his blood pressures are in the 302K systolic at home  Here there 160/80  The plan is to do the following 1  Make an appointment with Nephrology for presumed diabetic nephropathy 2  Monitor blood pressures and bring in list and blood pressure cuff in 4-5 weeks  This was explained to the patient and the daughter  1  History of CVA (cerebrovascular accident)     2  Stage 3 chronic kidney disease ([facility] [address] )     3  Nonrheumatic aortic valve stenosis     4  Coronary artery disease involving native coronary artery of native heart without angina pectoris     5  Poorly-controlled hypertension  Ambulatory referral to Nephrology       Orders Placed This Encounter   Procedures    Ambulatory referral to Nephrology         Subjective:  He comes in for follow-up  His blood pressure was doing better but now is elevated  Is positive history of a CVA  Has renal insufficiency aortic valve disease  Positive history of coronary artery disease  Patient ID: Sunni Guillen is a 82 y o  male  HPI    The following portions of the patient's history were reviewed and updated as appropriate:   He has a past medical history of Acute ST elevation myocardial infarction [facility]), Aortic valve disorder, Benign prostatic hyperplasia with lower urinary tract symptoms, CAD (coronary artery disease), DM2 (diabetes mellitus, type 2) ([facility] [address] ), History of colonoscopy, History of transfusion, History of transient cerebral ischemia, HLD (hyperlipidemia), HTN (hypertension), and Neuralgia ,  does not have any pertinent problems on file  ,   has a past surgical history that includes Total hip arthroplasty (Right); Hip hardware removal; Cardiac catheterization; Coronary angioplasty with stent (2008); Hand surgery; and Total hip arthroplasty (Bilateral)  ,  family history includes Emphysema in his father and sister  ,   reports that he has never smoked  He has never used smokeless tobacco  He reports that he does not drink alcohol or use drugs  ,  is allergic to celecoxib; hydromorphone; pravastatin; and statins       Current Outpatient Medications:     amLODIPine (NORVASC) 10 mg tablet, TAKE 1 TABLET BY MOUTH  DAILY, Disp: 90 tablet, Rfl: 1    aspirin (ECOTRIN LOW STRENGTH) 81 mg EC tablet, 1 tab daily, Disp: 30 tablet, Rfl: 4    Blood Glucose Monitoring Suppl (ONE TOUCH ULTRA MINI) w/Device KIT, by Does not apply route, Disp: , Rfl:     cholecalciferol (VITAMIN D3) 1,000 units tablet, Take 1,000 Units by mouth daily, Disp: , Rfl:     Cyanocobalamin ER (RA VITAMIN B-12) 1000 MCG TBCR, Take 2 tablets by mouth daily at bedtime  , Disp: , Rfl:     doxazosin (CARDURA) 4 mg tablet, TAKE 1 TABLET BY MOUTH  EVERY MORNING, Disp: 90 tablet, Rfl: 0    gabapentin (NEURONTIN) 100 mg capsule, Take 1 capsule (100 mg total) by mouth 2 (two) times a day for 90 days, Disp: 180 capsule, Rfl: 3    glucose blood (ONE TOUCH ULTRA TEST) test strip, Test once daily, Disp: 100 each, Rfl: 5    glyBURIDE (DIABETA) 5 mg tablet, Take 1 tablet by mouth daily, Disp: , Rfl:     lisinopril (ZESTRIL) 40 mg tablet, TAKE 1 TABLET BY MOUTH  DAILY, Disp: 90 tablet, Rfl: 1    metoprolol tartrate (LOPRESSOR) 100 mg tablet, TAKE ONE-HALF TABLET BY  MOUTH TWICE A DAY, Disp: 90 tablet, Rfl: 3    Omega-3 Fatty Acids (FISH OIL PO), Take 1 capsule by mouth Daily, Disp: , Rfl:     pyridoxine (RA VITAMIN B-6) 100 MG tablet, Take 1 tablet by mouth daily, Disp: , Rfl:     clopidogrel (PLAVIX) 75 mg tablet, TAKE 1 TABLET BY MOUTH  DAILY, Disp: 90 tablet, Rfl: 1    Review of Systems   Constitutional: Negative for activity change, appetite change, chills, diaphoresis, fatigue, fever and unexpected weight change     HENT: Negative for congestion, ear pain, hearing loss, mouth sores, nosebleeds, postnasal drip, sinus pressure, sinus pain, sore throat and trouble swallowing  Eyes: Negative for pain, discharge and visual disturbance  Respiratory: Negative for apnea, cough, chest tightness, shortness of breath and wheezing  Cardiovascular: Negative for chest pain, palpitations and leg swelling  Gastrointestinal: Negative for abdominal pain, anal bleeding, blood in stool, constipation, diarrhea, nausea and vomiting  Endocrine: Negative for polydipsia and polyphagia  Has type 2 diabetes which is well controlled  Genitourinary: Negative for decreased urine volume, dysuria, flank pain, frequency, hematuria and urgency  Has increasing renal insufficiency  Musculoskeletal: Negative for arthralgias, back pain, gait problem, joint swelling and myalgias  Skin: Negative for rash and wound  Allergic/Immunologic: Negative for environmental allergies and food allergies  Neurological: Negative for dizziness, tremors, seizures, syncope, speech difficulty, light-headedness, numbness and headaches  No recurrence of his CVA symptoms  Hematological: Negative for adenopathy  Does not bruise/bleed easily  Psychiatric/Behavioral: Negative for agitation, confusion, hallucinations, sleep disturbance and suicidal ideas  The patient is not nervous/anxious  Objective:  BP (!) 188/74 (BP Location: Left arm, Patient Position: Sitting, Cuff Size: Standard)   Pulse (!) 52   Ht 5' 8 5" (1 74 m)   Wt 102 kg (225 lb 6 4 oz)   SpO2 96%   BMI 33 77 kg/m²      Physical Exam   Constitutional: He appears well-developed and well-nourished  No distress  Blood pressure is 160/80 P rhythm is regular  Rate is 80  O2 saturations 96   HENT:   Head: Normocephalic  Right Ear: External ear normal    Left Ear: External ear normal    Nose: Nose normal    Mouth/Throat: Oropharynx is clear and moist  No oropharyngeal exudate  Eyes: Pupils are equal, round, and reactive to light  Conjunctivae and EOM are normal  Right eye exhibits no discharge   Left eye exhibits no discharge  Neck: Normal range of motion  Neck supple  No thyromegaly present  Cardiovascular: Normal rate, regular rhythm and intact distal pulses  Exam reveals no gallop and no friction rub  Murmur heard  Has a 2/6 systolic murmur  Rhythm is regular  Pulmonary/Chest: Effort normal and breath sounds normal  No respiratory distress  He has no wheezes  He has no rales  Abdominal: Soft  Bowel sounds are normal  He exhibits no distension and no mass  There is no tenderness  There is no rebound and no guarding  Abdomen is moderately overweight  Musculoskeletal: Normal range of motion  He exhibits no edema, tenderness or deformity  Lymphadenopathy:     He has no cervical adenopathy  Neurological: He is alert  He has normal reflexes  He displays normal reflexes  No cranial nerve deficit  Coordination normal    Skin: Skin is warm and dry  No rash noted  No erythema  Psychiatric: He has a normal mood and affect  His behavior is normal  Judgment and thought content normal    Nursing note and vitals reviewed          Recent Results (from the past 1008 hour(s))   CBC and differential    Collection Time: 09/09/19  9:01 AM   Result Value Ref Range    WBC 5 63 4 31 - 10 16 Thousand/uL    RBC 4 26 3 88 - 5 62 Million/uL    Hemoglobin 13 3 12 0 - 17 0 g/dL    Hematocrit 41 7 36 5 - 49 3 %    MCV 98 82 - 98 fL    MCH 31 2 26 8 - 34 3 pg    MCHC 31 9 31 4 - 37 4 g/dL    RDW 12 7 11 6 - 15 1 %    MPV 9 9 8 9 - 12 7 fL    Platelets 776 372 - 593 Thousands/uL    nRBC 0 /100 WBCs    Neutrophils Relative 59 43 - 75 %    Immat GRANS % 0 0 - 2 %    Lymphocytes Relative 25 14 - 44 %    Monocytes Relative 9 4 - 12 %    Eosinophils Relative 6 0 - 6 %    Basophils Relative 1 0 - 1 %    Neutrophils Absolute 3 34 1 85 - 7 62 Thousands/µL    Immature Grans Absolute 0 01 0 00 - 0 20 Thousand/uL    Lymphocytes Absolute 1 41 0 60 - 4 47 Thousands/µL    Monocytes Absolute 0 49 0 17 - 1 22 Thousand/µL    Eosinophils Absolute 0 32 0 00 - 0 61 Thousand/µL    Basophils Absolute 0 06 0 00 - 0 10 Thousands/µL   Comprehensive metabolic panel    Collection Time: 09/09/19  9:01 AM   Result Value Ref Range    Sodium 139 136 - 145 mmol/L    Potassium 4 5 3 5 - 5 3 mmol/L    Chloride 105 100 - 108 mmol/L    CO2 28 21 - 32 mmol/L    ANION GAP 6 4 - 13 mmol/L    BUN 32 (H) 5 - 25 mg/dL    Creatinine 1 82 (H) 0 60 - 1 30 mg/dL    Glucose, Fasting 111 (H) 65 - 99 mg/dL    Calcium 9 0 8 3 - 10 1 mg/dL    AST 14 5 - 45 U/L    ALT 20 12 - 78 U/L    Alkaline Phosphatase 56 46 - 116 U/L    Total Protein 7 5 6 4 - 8 2 g/dL    Albumin 3 9 3 5 - 5 0 g/dL    Total Bilirubin 0 50 0 20 - 1 00 mg/dL    eGFR 34 ml/min/1 73sq m   HEMOGLOBIN A1C W/ EAG ESTIMATION    Collection Time: 09/09/19  9:01 AM   Result Value Ref Range    Hemoglobin A1C 6 4 (H) 4 2 - 6 3 %     mg/dl   Lipid panel    Collection Time: 09/09/19  9:01 AM   Result Value Ref Range    Cholesterol 175 50 - 200 mg/dL    Triglycerides 77 <=150 mg/dL    HDL, Direct 47 40 - 60 mg/dL    LDL Calculated 113 (H) 0 - 100 mg/dL    Non-HDL-Chol (CHOL-HDL) 128 mg/dl

## 2019-09-16 ENCOUNTER — TELEPHONE (OUTPATIENT)
Dept: NEPHROLOGY | Facility: CLINIC | Age: 84
End: 2019-09-16

## 2019-09-16 NOTE — TELEPHONE ENCOUNTER
I spoke with patient's daughter and she automatically requested Tuesday 10/8/19 because she wants to come in with the patient for his appointment  Balbir Malloy is schedule for 10/8/19 @ 2:45pm with Dr Pietro Zamudio

## 2019-09-27 ENCOUNTER — TELEPHONE (OUTPATIENT)
Dept: INTERNAL MEDICINE CLINIC | Facility: CLINIC | Age: 84
End: 2019-09-27

## 2019-10-08 ENCOUNTER — CONSULT (OUTPATIENT)
Dept: NEPHROLOGY | Facility: CLINIC | Age: 84
End: 2019-10-08
Payer: MEDICARE

## 2019-10-08 ENCOUNTER — APPOINTMENT (OUTPATIENT)
Dept: LAB | Facility: HOSPITAL | Age: 84
End: 2019-10-08
Attending: INTERNAL MEDICINE
Payer: MEDICARE

## 2019-10-08 VITALS
WEIGHT: 222.8 LBS | BODY MASS INDEX: 33 KG/M2 | TEMPERATURE: 97.5 F | HEART RATE: 59 BPM | SYSTOLIC BLOOD PRESSURE: 160 MMHG | HEIGHT: 69 IN | RESPIRATION RATE: 18 BRPM | DIASTOLIC BLOOD PRESSURE: 80 MMHG

## 2019-10-08 DIAGNOSIS — N18.30 STAGE 3 CHRONIC KIDNEY DISEASE (HCC): ICD-10-CM

## 2019-10-08 DIAGNOSIS — Z00.00 HEALTH CARE MAINTENANCE: ICD-10-CM

## 2019-10-08 DIAGNOSIS — E11.9 TYPE 2 DIABETES MELLITUS WITHOUT COMPLICATION, WITHOUT LONG-TERM CURRENT USE OF INSULIN (HCC): ICD-10-CM

## 2019-10-08 DIAGNOSIS — R80.8 OTHER PROTEINURIA: ICD-10-CM

## 2019-10-08 DIAGNOSIS — N18.30 HYPERTENSIVE KIDNEY DISEASE WITH STAGE 3 CHRONIC KIDNEY DISEASE (HCC): ICD-10-CM

## 2019-10-08 DIAGNOSIS — I12.9 HYPERTENSIVE KIDNEY DISEASE WITH STAGE 3 CHRONIC KIDNEY DISEASE (HCC): ICD-10-CM

## 2019-10-08 DIAGNOSIS — I10 POORLY-CONTROLLED HYPERTENSION: ICD-10-CM

## 2019-10-08 DIAGNOSIS — N18.30 STAGE 3 CHRONIC KIDNEY DISEASE (HCC): Primary | ICD-10-CM

## 2019-10-08 LAB
ANION GAP SERPL CALCULATED.3IONS-SCNC: 5 MMOL/L (ref 4–13)
BACTERIA UR QL AUTO: ABNORMAL /HPF
BASOPHILS # BLD AUTO: 0.04 THOUSANDS/ΜL (ref 0–0.1)
BASOPHILS NFR BLD AUTO: 1 % (ref 0–1)
BILIRUB UR QL STRIP: NEGATIVE
BUN SERPL-MCNC: 35 MG/DL (ref 5–25)
CALCIUM SERPL-MCNC: 9.5 MG/DL (ref 8.3–10.1)
CHLORIDE SERPL-SCNC: 103 MMOL/L (ref 100–108)
CLARITY UR: CLEAR
CO2 SERPL-SCNC: 29 MMOL/L (ref 21–32)
COLOR UR: YELLOW
CREAT SERPL-MCNC: 1.71 MG/DL (ref 0.6–1.3)
EOSINOPHIL # BLD AUTO: 0.29 THOUSAND/ΜL (ref 0–0.61)
EOSINOPHIL NFR BLD AUTO: 5 % (ref 0–6)
ERYTHROCYTE [DISTWIDTH] IN BLOOD BY AUTOMATED COUNT: 12.8 % (ref 11.6–15.1)
GFR SERPL CREATININE-BSD FRML MDRD: 37 ML/MIN/1.73SQ M
GLUCOSE SERPL-MCNC: 108 MG/DL (ref 65–140)
GLUCOSE UR STRIP-MCNC: NEGATIVE MG/DL
HCT VFR BLD AUTO: 41.1 % (ref 36.5–49.3)
HGB BLD-MCNC: 13.3 G/DL (ref 12–17)
HGB UR QL STRIP.AUTO: NEGATIVE
HYALINE CASTS #/AREA URNS LPF: ABNORMAL /LPF
IMM GRANULOCYTES # BLD AUTO: 0.03 THOUSAND/UL (ref 0–0.2)
IMM GRANULOCYTES NFR BLD AUTO: 1 % (ref 0–2)
KETONES UR STRIP-MCNC: NEGATIVE MG/DL
LEUKOCYTE ESTERASE UR QL STRIP: NEGATIVE
LYMPHOCYTES # BLD AUTO: 1.97 THOUSANDS/ΜL (ref 0.6–4.47)
LYMPHOCYTES NFR BLD AUTO: 33 % (ref 14–44)
MCH RBC QN AUTO: 31.1 PG (ref 26.8–34.3)
MCHC RBC AUTO-ENTMCNC: 32.4 G/DL (ref 31.4–37.4)
MCV RBC AUTO: 96 FL (ref 82–98)
MONOCYTES # BLD AUTO: 0.49 THOUSAND/ΜL (ref 0.17–1.22)
MONOCYTES NFR BLD AUTO: 8 % (ref 4–12)
NEUTROPHILS # BLD AUTO: 3.23 THOUSANDS/ΜL (ref 1.85–7.62)
NEUTS SEG NFR BLD AUTO: 52 % (ref 43–75)
NITRITE UR QL STRIP: NEGATIVE
NON-SQ EPI CELLS URNS QL MICRO: ABNORMAL /HPF
NRBC BLD AUTO-RTO: 0 /100 WBCS
PH UR STRIP.AUTO: 5.5 [PH]
PHOSPHATE SERPL-MCNC: 3.8 MG/DL (ref 2.3–4.1)
PLATELET # BLD AUTO: 196 THOUSANDS/UL (ref 149–390)
PMV BLD AUTO: 9.8 FL (ref 8.9–12.7)
POTASSIUM SERPL-SCNC: 4.8 MMOL/L (ref 3.5–5.3)
PROT UR STRIP-MCNC: ABNORMAL MG/DL
RBC # BLD AUTO: 4.27 MILLION/UL (ref 3.88–5.62)
RBC #/AREA URNS AUTO: ABNORMAL /HPF
SODIUM SERPL-SCNC: 137 MMOL/L (ref 136–145)
SP GR UR STRIP.AUTO: 1.01 (ref 1–1.03)
URATE SERPL-MCNC: 7 MG/DL (ref 4.2–8)
UROBILINOGEN UR QL STRIP.AUTO: 0.2 E.U./DL
WBC # BLD AUTO: 6.05 THOUSAND/UL (ref 4.31–10.16)
WBC #/AREA URNS AUTO: ABNORMAL /HPF

## 2019-10-08 PROCEDURE — 84100 ASSAY OF PHOSPHORUS: CPT

## 2019-10-08 PROCEDURE — 84550 ASSAY OF BLOOD/URIC ACID: CPT

## 2019-10-08 PROCEDURE — 99214 OFFICE O/P EST MOD 30 MIN: CPT | Performed by: INTERNAL MEDICINE

## 2019-10-08 PROCEDURE — 83970 ASSAY OF PARATHORMONE: CPT

## 2019-10-08 PROCEDURE — 82570 ASSAY OF URINE CREATININE: CPT

## 2019-10-08 PROCEDURE — 80048 BASIC METABOLIC PNL TOTAL CA: CPT

## 2019-10-08 PROCEDURE — 81001 URINALYSIS AUTO W/SCOPE: CPT

## 2019-10-08 PROCEDURE — 36415 COLL VENOUS BLD VENIPUNCTURE: CPT

## 2019-10-08 PROCEDURE — 82306 VITAMIN D 25 HYDROXY: CPT

## 2019-10-08 PROCEDURE — 82043 UR ALBUMIN QUANTITATIVE: CPT

## 2019-10-08 PROCEDURE — 85025 COMPLETE CBC W/AUTO DIFF WBC: CPT

## 2019-10-08 RX ORDER — DOXAZOSIN 8 MG/1
8 TABLET ORAL DAILY
Qty: 90 TABLET | Refills: 2 | Status: SHIPPED | OUTPATIENT
Start: 2019-10-08 | End: 2020-06-01

## 2019-10-08 RX ORDER — GLYBURIDE 5 MG/1
5 TABLET ORAL DAILY
Qty: 90 TABLET | Refills: 0 | Status: SHIPPED | OUTPATIENT
Start: 2019-10-08 | End: 2019-10-25 | Stop reason: SDUPTHER

## 2019-10-08 NOTE — LETTER
October 8, 2019     Ami Daniels, [address] [address]    Patient: Maria Fernanda Chen   YOB: 1935   Date of Visit: 10/8/2019       Dear Dr Thea Burgos: Thank you for referring eYssi Julio to me for evaluation  Below are my notes for this consultation  If you have questions, please do not hesitate to call me  I look forward to following your patient along with you  Sincerely,        Celia Ortega MD        CC: No Recipients  Celia Ortega MD  10/8/2019  3:43 PM  Sign at close encounter  Lashell Rahman 82 y o  male Date of Birth: @ MRN: [id number]    Encounter: [id number] DATE: 10/8/2019    REASON FOR VISIT: Maria Fernanda Chen is a 82 y o  male who is here on 10/8/2019 for Consult and Hypertension      HPI:    This is a 82-year-old male with a past medical history of diabetes type 2, hypertension, referred to Nephrology for further management of CKD stage 3  Patient was in fact seen by myself in January 2017 for initial consultation who has lost follow-up until now  Patient's daughter-Barrie was also present at the time of consultation and history was also obtained from her and all the questions were answered  Upon review of old medical records patient's new baseline creatinine seems to be fluctuating around 1 6-1 8 with last known creatinine of 1 82 with EGFR of 34  Patient has hypertension but lately seems to be not under well control  Patient takes multiple antihypertensive medication and also check blood pressure on regular basis at home and I have reviewed patient's home log of blood pressure suggesting patient's SBP is running around 140-160 at home  Patient also have underlying diabetes type 2 and take glyburide  Patient's last known Hb A1c was 6 4% which is under well control  According to patient's daughter, PCP has not renew glyburide and divert requesting glyburide refill      Patient was also previously found to have proteinuria which was suspected due to underlying diabetic nephropathy on top of hypertensive nephrosclerosis  Patient takes lisinopril  Patient was also found to have vitamin-D deficiency and currently taking cholecalciferol 1000 Units PO daily  Patient takes all the medications as prescribed and denies use of NSAIDs  REVIEW OF SYSTEMS:    Review of Systems   Constitutional: Negative for chills and fever  HENT: Negative for nosebleeds and sore throat  Eyes: Negative for photophobia and pain  Respiratory: Negative for choking and wheezing  Cardiovascular: Negative for chest pain and palpitations  Gastrointestinal: Negative for abdominal pain and blood in stool  Endocrine: Negative for cold intolerance and heat intolerance  Genitourinary: Negative for flank pain and hematuria  Musculoskeletal: Negative for joint swelling and neck pain  Skin: Negative for color change and pallor  Allergic/Immunologic: Negative for environmental allergies and immunocompromised state  Neurological: Negative for seizures and syncope  Hematological: Negative for adenopathy  Does not bruise/bleed easily  Psychiatric/Behavioral: Negative for confusion and suicidal ideas           PAST MEDICAL HISTORY:  Past Medical History:   Diagnosis Date    Acute ST elevation myocardial infarction [facility])     last assessed: 04/15/2016    Aortic valve disorder     Benign prostatic hyperplasia with lower urinary tract symptoms     CAD (coronary artery disease)     DM2 (diabetes mellitus, type 2) ([facility] [address] )     History of colonoscopy     resolved: 05/08/2012    History of transfusion     History of transient cerebral ischemia     HLD (hyperlipidemia)     HTN (hypertension)     Neuralgia        PAST SURGICAL HISTORY:  Past Surgical History:   Procedure Laterality Date    CARDIAC CATHETERIZATION      Outcome: successful; last assessed: 02/03/2015    CORONARY ANGIOPLASTY WITH STENT PLACEMENT  2008 stent to LAD     HAND SURGERY      thumb    HIP HARDWARE REMOVAL      TOTAL HIP ARTHROPLASTY Right     TOTAL HIP ARTHROPLASTY Bilateral        SOCIAL HISTORY:  Social History     Substance and Sexual Activity   Alcohol Use No     Social History     Substance and Sexual Activity   Drug Use No     Social History     Tobacco Use   Smoking Status Never Smoker   Smokeless Tobacco Never Used       FAMILY HISTORY:  Family History   Problem Relation Age of Onset    Emphysema Father     Emphysema Sister        ALLERGY:  Allergies   Allergen Reactions    Celecoxib     Hydromorphone     Pravastatin Hives    Statins        MEDICATIONS:    Current Outpatient Medications:     amLODIPine (NORVASC) 10 mg tablet, TAKE 1 TABLET BY MOUTH  DAILY, Disp: 90 tablet, Rfl: 1    Blood Glucose Monitoring Suppl (ONE TOUCH ULTRA MINI) w/Device KIT, by Does not apply route, Disp: , Rfl:     cholecalciferol (VITAMIN D3) 1,000 units tablet, Take 1,000 Units by mouth daily, Disp: , Rfl:     doxazosin (CARDURA) 8 MG tablet, Take 1 tablet (8 mg total) by mouth daily, Disp: 90 tablet, Rfl: 2    gabapentin (NEURONTIN) 100 mg capsule, Take 1 capsule (100 mg total) by mouth 2 (two) times a day for 90 days, Disp: 180 capsule, Rfl: 3    glucose blood (ONE TOUCH ULTRA TEST) test strip, Test once daily, Disp: 100 each, Rfl: 5    glyBURIDE (DIABETA) 5 mg tablet, Take 1 tablet (5 mg total) by mouth daily, Disp: 90 tablet, Rfl: 0    lisinopril (ZESTRIL) 40 mg tablet, TAKE 1 TABLET BY MOUTH  DAILY, Disp: 90 tablet, Rfl: 1    metoprolol tartrate (LOPRESSOR) 100 mg tablet, TAKE ONE-HALF TABLET BY  MOUTH TWICE A DAY, Disp: 90 tablet, Rfl: 3    aspirin (ECOTRIN LOW STRENGTH) 81 mg EC tablet, 1 tab daily (Patient not taking: Reported on 10/8/2019), Disp: 30 tablet, Rfl: 4    clopidogrel (PLAVIX) 75 mg tablet, TAKE 1 TABLET BY MOUTH  DAILY (Patient not taking: Reported on 10/8/2019), Disp: 90 tablet, Rfl: 1    Cyanocobalamin ER (RA VITAMIN B-12) 1000 MCG TBCR, Take 2 tablets by mouth daily at bedtime  , Disp: , Rfl:     Omega-3 Fatty Acids (FISH OIL PO), Take 1 capsule by mouth Daily, Disp: , Rfl:     pyridoxine (RA VITAMIN B-6) 100 MG tablet, Take 1 tablet by mouth daily, Disp: , Rfl:     PHYSICAL EXAM:  Vitals:    10/08/19 1440   BP: 160/80   BP Location: Left arm   Patient Position: Sitting   Cuff Size: Standard   Pulse: 59   Resp: 18   Temp: 97 5 °F (36 4 °C)   TempSrc: Tympanic   Weight: 101 kg (222 lb 12 8 oz)   Height: 5' 9" (1 753 m)     Body mass index is 32 9 kg/m²  Physical Exam   Constitutional: He appears well-nourished  No distress  HENT:   Head: Normocephalic and atraumatic  Eyes: No scleral icterus  Neck: Neck supple  No JVD present  Cardiovascular: Normal rate, S1 normal and S2 normal    Pulmonary/Chest: Effort normal  No accessory muscle usage  No respiratory distress  He has no wheezes  Abdominal: Soft  He exhibits no distension  Musculoskeletal: He exhibits no edema  Right ankle: He exhibits no swelling  Left ankle: He exhibits no swelling  Right hand: He exhibits no laceration  Left hand: He exhibits no laceration  Lymphadenopathy:        Right cervical: No superficial cervical adenopathy present  Left cervical: No superficial cervical adenopathy present  Neurological: He is alert  He is not disoriented  Skin: Skin is warm  No cyanosis  Psychiatric: He is not combative  He does not exhibit a depressed mood  He expresses no suicidal ideation         LAB RESULTS:  Results for orders placed or performed in visit on 09/09/19   CBC and differential   Result Value Ref Range    WBC 5 63 4 31 - 10 16 Thousand/uL    RBC 4 26 3 88 - 5 62 Million/uL    Hemoglobin 13 3 12 0 - 17 0 g/dL    Hematocrit 41 7 36 5 - 49 3 %    MCV 98 82 - 98 fL    MCH 31 2 26 8 - 34 3 pg    MCHC 31 9 31 4 - 37 4 g/dL    RDW 12 7 11 6 - 15 1 %    MPV 9 9 8 9 - 12 7 fL    Platelets 812 420 - 206 Thousands/uL    nRBC 0 /100 WBCs    Neutrophils Relative 59 43 - 75 %    Immat GRANS % 0 0 - 2 %    Lymphocytes Relative 25 14 - 44 %    Monocytes Relative 9 4 - 12 %    Eosinophils Relative 6 0 - 6 %    Basophils Relative 1 0 - 1 %    Neutrophils Absolute 3 34 1 85 - 7 62 Thousands/µL    Immature Grans Absolute 0 01 0 00 - 0 20 Thousand/uL    Lymphocytes Absolute 1 41 0 60 - 4 47 Thousands/µL    Monocytes Absolute 0 49 0 17 - 1 22 Thousand/µL    Eosinophils Absolute 0 32 0 00 - 0 61 Thousand/µL    Basophils Absolute 0 06 0 00 - 0 10 Thousands/µL   Comprehensive metabolic panel   Result Value Ref Range    Sodium 139 136 - 145 mmol/L    Potassium 4 5 3 5 - 5 3 mmol/L    Chloride 105 100 - 108 mmol/L    CO2 28 21 - 32 mmol/L    ANION GAP 6 4 - 13 mmol/L    BUN 32 (H) 5 - 25 mg/dL    Creatinine 1 82 (H) 0 60 - 1 30 mg/dL    Glucose, Fasting 111 (H) 65 - 99 mg/dL    Calcium 9 0 8 3 - 10 1 mg/dL    AST 14 5 - 45 U/L    ALT 20 12 - 78 U/L    Alkaline Phosphatase 56 46 - 116 U/L    Total Protein 7 5 6 4 - 8 2 g/dL    Albumin 3 9 3 5 - 5 0 g/dL    Total Bilirubin 0 50 0 20 - 1 00 mg/dL    eGFR 34 ml/min/1 73sq m   HEMOGLOBIN A1C W/ EAG ESTIMATION   Result Value Ref Range    Hemoglobin A1C 6 4 (H) 4 2 - 6 3 %     mg/dl   Lipid panel   Result Value Ref Range    Cholesterol 175 50 - 200 mg/dL    Triglycerides 77 <=150 mg/dL    HDL, Direct 47 40 - 60 mg/dL    LDL Calculated 113 (H) 0 - 100 mg/dL    Non-HDL-Chol (CHOL-HDL) 128 mg/dl       ASSESSMENT and PLAN:  Daniel Junior was seen today for consult and hypertension  Diagnoses and all orders for this visit:    Stage 3 chronic kidney disease ([address] )  -     CBC and differential; Future  -     Basic metabolic panel; Future  -     Urinalysis with microscopic; Future  -     Microalbumin / creatinine urine ratio; Future  -     Phosphorus; Future  -     Uric acid; Future  -     PTH, intact; Future  -     Vitamin D 25 hydroxy;  Future  -     CBC and differential; Future  -     Basic metabolic panel; Future  -     Urinalysis with reflex to microscopic; Future  -     Microalbumin / creatinine urine ratio; Future    Hypertensive kidney disease with stage 3 chronic kidney disease (HCC)  -     Basic metabolic panel; Future  -     Urinalysis with microscopic; Future  -     Microalbumin / creatinine urine ratio; Future    Poorly-controlled hypertension  -     Ambulatory referral to Nephrology    Other proteinuria  -     Urinalysis with microscopic; Future  -     Microalbumin / creatinine urine ratio; Future  -     Urinalysis with reflex to microscopic; Future  -     Microalbumin / creatinine urine ratio; Future    Health care maintenance  -     doxazosin (CARDURA) 8 MG tablet; Take 1 tablet (8 mg total) by mouth daily    Type 2 diabetes mellitus without complication, without long-term current use of insulin ([facility])  -     glyBURIDE (DIABETA) 5 mg tablet; Take 1 tablet (5 mg total) by mouth daily      1  CKD stage 3  Multifactorial and suspected due to underlying diabetic nephropathy on top of hypertensive nephrosclerosis plus advanced age  Upon review of old medical records, patient's new baseline creatinine seems to be fluctuating around 1 6-1 8 with last known creatinine of 1 82 with EGFR of 34  Management of diabetes and hypertension as mentioned below  Plan to check CBC, BMP, uric acid, PTH, vitamin-D, urine analysis along with urine microalbumin to creatinine ratio for further evaluation  Plan to avoid NSAIDs as and recheck renal function before next visit  2  Hypertension in chronic kidney disease  Today's blood pressure was elevated and above the goal   I have also reviewed patient's home log of blood pressure suggesting patient's hypertension is not under well control at home  Patient's heart rate is 59 and would not consider increasing the dose of metoprolol  Plan to increase doxazosin to 8 mg PO daily    Continue amlodipine 10 mg PO daily, metoprolol 50 mg PO BID and lisinopril 40 mg PO daily today  Also advised patient to follow low-salt diet  3  Diabetes type 2 in chronic kidney disease  Goal Hb A1c would be < 7% for underlying chronic kidney disease  Recent Hb A1c was 6 4% which is at goal for underlying chronic kidney disease  Will refill glyburide per patient's request   Defer further management of diabetes to PCP  4  Proteinuria  Last known urine microalbumin to creatinine ratio was 200 mg  Recheck proteinuria today  Currently patient is on lisinopril 40 mg PO daily  Management of diabetes and hypertension as mentioned earlier  5  Vitamin-D deficiency  Last known vitamin-D level was 20 from 12/6/2017  Currently patient is taking cholecalciferol 1000 Units PO daily  Recheck vitamin-D level today  Returns to Nephrology office in 3 months  Plan to check CBC, BMP, urine analysis along with urine microalbumin to creatinine ratio before next visit  Patient's daughter-Barrie can be reached at [phone]    Portions of the record may have been created with voice recognition software  Occasional wrong word or "sound a like" substitutions may have occurred due to the inherent limitations of voice recognition software  Read the chart carefully and recognize, using context, where substitutions have occurred  If you have any questions, please contact the dictating provider

## 2019-10-08 NOTE — PROGRESS NOTES
NEPHROLOGY OFFICE FOLLOW UP  Jensen Huang 82 y o  male Date of Birth: @ MRN: [id number]    Encounter: [id number] DATE: 10/8/2019    REASON FOR VISIT: Jensen Huang is a 82 y o  male who is here on 10/8/2019 for Consult and Hypertension      HPI:    This is a 82-year-old male with a past medical history of diabetes type 2, hypertension, referred to Nephrology for further management of CKD stage 3  Patient was in fact seen by myself in January 2017 for initial consultation who has lost follow-up until now  Patient's daughter-Barrie was also present at the time of consultation and history was also obtained from her and all the questions were answered  Upon review of old medical records patient's new baseline creatinine seems to be fluctuating around 1 6-1 8 with last known creatinine of 1 82 with EGFR of 34  Patient has hypertension but lately seems to be not under well control  Patient takes multiple antihypertensive medication and also check blood pressure on regular basis at home and I have reviewed patient's home log of blood pressure suggesting patient's SBP is running around 140-160 at home  Patient also have underlying diabetes type 2 and take glyburide  Patient's last known Hb A1c was 6 4% which is under well control  According to patient's daughter, PCP has not renew glyburide and divert requesting glyburide refill  Patient was also previously found to have proteinuria which was suspected due to underlying diabetic nephropathy on top of hypertensive nephrosclerosis  Patient takes lisinopril  Patient was also found to have vitamin-D deficiency and currently taking cholecalciferol 1000 Units PO daily  Patient takes all the medications as prescribed and denies use of NSAIDs  REVIEW OF SYSTEMS:    Review of Systems   Constitutional: Negative for chills and fever  HENT: Negative for nosebleeds and sore throat  Eyes: Negative for photophobia and pain     Respiratory: Negative for choking and wheezing  Cardiovascular: Negative for chest pain and palpitations  Gastrointestinal: Negative for abdominal pain and blood in stool  Endocrine: Negative for cold intolerance and heat intolerance  Genitourinary: Negative for flank pain and hematuria  Musculoskeletal: Negative for joint swelling and neck pain  Skin: Negative for color change and pallor  Allergic/Immunologic: Negative for environmental allergies and immunocompromised state  Neurological: Negative for seizures and syncope  Hematological: Negative for adenopathy  Does not bruise/bleed easily  Psychiatric/Behavioral: Negative for confusion and suicidal ideas           PAST MEDICAL HISTORY:  Past Medical History:   Diagnosis Date    Acute ST elevation myocardial infarction [facility])     last assessed: 04/15/2016    Aortic valve disorder     Benign prostatic hyperplasia with lower urinary tract symptoms     CAD (coronary artery disease)     DM2 (diabetes mellitus, type 2) (HCC)     History of colonoscopy     resolved: 05/08/2012    History of transfusion     History of transient cerebral ischemia     HLD (hyperlipidemia)     HTN (hypertension)     Neuralgia        PAST SURGICAL HISTORY:  Past Surgical History:   Procedure Laterality Date    CARDIAC CATHETERIZATION      Outcome: successful; last assessed: 02/03/2015    CORONARY ANGIOPLASTY WITH STENT PLACEMENT  2008    stent to LAD     HAND SURGERY      thumb    HIP HARDWARE REMOVAL      TOTAL HIP ARTHROPLASTY Right     TOTAL HIP ARTHROPLASTY Bilateral        SOCIAL HISTORY:  Social History     Substance and Sexual Activity   Alcohol Use No     Social History     Substance and Sexual Activity   Drug Use No     Social History     Tobacco Use   Smoking Status Never Smoker   Smokeless Tobacco Never Used       FAMILY HISTORY:  Family History   Problem Relation Age of Onset    Emphysema Father     Emphysema Sister        ALLERGY:  Allergies   Allergen Reactions    Celecoxib     Hydromorphone     Pravastatin Hives    Statins        MEDICATIONS:    Current Outpatient Medications:     amLODIPine (NORVASC) 10 mg tablet, TAKE 1 TABLET BY MOUTH  DAILY, Disp: 90 tablet, Rfl: 1    Blood Glucose Monitoring Suppl (ONE TOUCH ULTRA MINI) w/Device KIT, by Does not apply route, Disp: , Rfl:     cholecalciferol (VITAMIN D3) 1,000 units tablet, Take 1,000 Units by mouth daily, Disp: , Rfl:     doxazosin (CARDURA) 8 MG tablet, Take 1 tablet (8 mg total) by mouth daily, Disp: 90 tablet, Rfl: 2    gabapentin (NEURONTIN) 100 mg capsule, Take 1 capsule (100 mg total) by mouth 2 (two) times a day for 90 days, Disp: 180 capsule, Rfl: 3    glucose blood (ONE TOUCH ULTRA TEST) test strip, Test once daily, Disp: 100 each, Rfl: 5    glyBURIDE (DIABETA) 5 mg tablet, Take 1 tablet (5 mg total) by mouth daily, Disp: 90 tablet, Rfl: 0    lisinopril (ZESTRIL) 40 mg tablet, TAKE 1 TABLET BY MOUTH  DAILY, Disp: 90 tablet, Rfl: 1    metoprolol tartrate (LOPRESSOR) 100 mg tablet, TAKE ONE-HALF TABLET BY  MOUTH TWICE A DAY, Disp: 90 tablet, Rfl: 3    aspirin (ECOTRIN LOW STRENGTH) 81 mg EC tablet, 1 tab daily (Patient not taking: Reported on 10/8/2019), Disp: 30 tablet, Rfl: 4    clopidogrel (PLAVIX) 75 mg tablet, TAKE 1 TABLET BY MOUTH  DAILY (Patient not taking: Reported on 10/8/2019), Disp: 90 tablet, Rfl: 1    Cyanocobalamin ER (RA VITAMIN B-12) 1000 MCG TBCR, Take 2 tablets by mouth daily at bedtime  , Disp: , Rfl:     Omega-3 Fatty Acids (FISH OIL PO), Take 1 capsule by mouth Daily, Disp: , Rfl:     pyridoxine (RA VITAMIN B-6) 100 MG tablet, Take 1 tablet by mouth daily, Disp: , Rfl:     PHYSICAL EXAM:  Vitals:    10/08/19 1440   BP: 160/80   BP Location: Left arm   Patient Position: Sitting   Cuff Size: Standard   Pulse: 59   Resp: 18   Temp: 97 5 °F (36 4 °C)   TempSrc: Tympanic   Weight: 101 kg (222 lb 12 8 oz)   Height: 5' 9" (1 753 m)     Body mass index is 32 9 kg/m²      Physical Exam   Constitutional: He appears well-nourished  No distress  HENT:   Head: Normocephalic and atraumatic  Eyes: No scleral icterus  Neck: Neck supple  No JVD present  Cardiovascular: Normal rate, S1 normal and S2 normal    Pulmonary/Chest: Effort normal  No accessory muscle usage  No respiratory distress  He has no wheezes  Abdominal: Soft  He exhibits no distension  Musculoskeletal: He exhibits no edema  Right ankle: He exhibits no swelling  Left ankle: He exhibits no swelling  Right hand: He exhibits no laceration  Left hand: He exhibits no laceration  Lymphadenopathy:        Right cervical: No superficial cervical adenopathy present  Left cervical: No superficial cervical adenopathy present  Neurological: He is alert  He is not disoriented  Skin: Skin is warm  No cyanosis  Psychiatric: He is not combative  He does not exhibit a depressed mood  He expresses no suicidal ideation         LAB RESULTS:  Results for orders placed or performed in visit on 09/09/19   CBC and differential   Result Value Ref Range    WBC 5 63 4 31 - 10 16 Thousand/uL    RBC 4 26 3 88 - 5 62 Million/uL    Hemoglobin 13 3 12 0 - 17 0 g/dL    Hematocrit 41 7 36 5 - 49 3 %    MCV 98 82 - 98 fL    MCH 31 2 26 8 - 34 3 pg    MCHC 31 9 31 4 - 37 4 g/dL    RDW 12 7 11 6 - 15 1 %    MPV 9 9 8 9 - 12 7 fL    Platelets 694 764 - 004 Thousands/uL    nRBC 0 /100 WBCs    Neutrophils Relative 59 43 - 75 %    Immat GRANS % 0 0 - 2 %    Lymphocytes Relative 25 14 - 44 %    Monocytes Relative 9 4 - 12 %    Eosinophils Relative 6 0 - 6 %    Basophils Relative 1 0 - 1 %    Neutrophils Absolute 3 34 1 85 - 7 62 Thousands/µL    Immature Grans Absolute 0 01 0 00 - 0 20 Thousand/uL    Lymphocytes Absolute 1 41 0 60 - 4 47 Thousands/µL    Monocytes Absolute 0 49 0 17 - 1 22 Thousand/µL    Eosinophils Absolute 0 32 0 00 - 0 61 Thousand/µL    Basophils Absolute 0 06 0 00 - 0 10 Thousands/µL   Comprehensive metabolic panel   Result Value Ref Range    Sodium 139 136 - 145 mmol/L    Potassium 4 5 3 5 - 5 3 mmol/L    Chloride 105 100 - 108 mmol/L    CO2 28 21 - 32 mmol/L    ANION GAP 6 4 - 13 mmol/L    BUN 32 (H) 5 - 25 mg/dL    Creatinine 1 82 (H) 0 60 - 1 30 mg/dL    Glucose, Fasting 111 (H) 65 - 99 mg/dL    Calcium 9 0 8 3 - 10 1 mg/dL    AST 14 5 - 45 U/L    ALT 20 12 - 78 U/L    Alkaline Phosphatase 56 46 - 116 U/L    Total Protein 7 5 6 4 - 8 2 g/dL    Albumin 3 9 3 5 - 5 0 g/dL    Total Bilirubin 0 50 0 20 - 1 00 mg/dL    eGFR 34 ml/min/1 73sq m   HEMOGLOBIN A1C W/ EAG ESTIMATION   Result Value Ref Range    Hemoglobin A1C 6 4 (H) 4 2 - 6 3 %     mg/dl   Lipid panel   Result Value Ref Range    Cholesterol 175 50 - 200 mg/dL    Triglycerides 77 <=150 mg/dL    HDL, Direct 47 40 - 60 mg/dL    LDL Calculated 113 (H) 0 - 100 mg/dL    Non-HDL-Chol (CHOL-HDL) 128 mg/dl       ASSESSMENT and PLAN:  Sally Candelario was seen today for consult and hypertension  Diagnoses and all orders for this visit:    Stage 3 chronic kidney disease ([address] )  -     CBC and differential; Future  -     Basic metabolic panel; Future  -     Urinalysis with microscopic; Future  -     Microalbumin / creatinine urine ratio; Future  -     Phosphorus; Future  -     Uric acid; Future  -     PTH, intact; Future  -     Vitamin D 25 hydroxy; Future  -     CBC and differential; Future  -     Basic metabolic panel; Future  -     Urinalysis with reflex to microscopic; Future  -     Microalbumin / creatinine urine ratio; Future    Hypertensive kidney disease with stage 3 chronic kidney disease (HCC)  -     Basic metabolic panel; Future  -     Urinalysis with microscopic; Future  -     Microalbumin / creatinine urine ratio; Future    Poorly-controlled hypertension  -     Ambulatory referral to Nephrology    Other proteinuria  -     Urinalysis with microscopic; Future  -     Microalbumin / creatinine urine ratio;  Future  -     Urinalysis with reflex to microscopic; Future  -     Microalbumin / creatinine urine ratio; Future    Health care maintenance  -     doxazosin (CARDURA) 8 MG tablet; Take 1 tablet (8 mg total) by mouth daily    Type 2 diabetes mellitus without complication, without long-term current use of insulin (HCC)  -     glyBURIDE (DIABETA) 5 mg tablet; Take 1 tablet (5 mg total) by mouth daily      1  CKD stage 3  Multifactorial and suspected due to underlying diabetic nephropathy on top of hypertensive nephrosclerosis plus advanced age  Upon review of old medical records, patient's new baseline creatinine seems to be fluctuating around 1 6-1 8 with last known creatinine of 1 82 with EGFR of 34  Management of diabetes and hypertension as mentioned below  Plan to check CBC, BMP, uric acid, PTH, vitamin-D, urine analysis along with urine microalbumin to creatinine ratio for further evaluation  Plan to avoid NSAIDs as and recheck renal function before next visit  2  Hypertension in chronic kidney disease  Today's blood pressure was elevated and above the goal   I have also reviewed patient's home log of blood pressure suggesting patient's hypertension is not under well control at home  Patient's heart rate is 59 and would not consider increasing the dose of metoprolol  Plan to increase doxazosin to 8 mg PO daily  Continue amlodipine 10 mg PO daily, metoprolol 50 mg PO BID and lisinopril 40 mg PO daily today  Also advised patient to follow low-salt diet  3  Diabetes type 2 in chronic kidney disease  Goal Hb A1c would be < 7% for underlying chronic kidney disease  Recent Hb A1c was 6 4% which is at goal for underlying chronic kidney disease  Will refill glyburide per patient's request   Defer further management of diabetes to PCP  4  Proteinuria  Last known urine microalbumin to creatinine ratio was 200 mg  Recheck proteinuria today  Currently patient is on lisinopril 40 mg PO daily    Management of diabetes and hypertension as mentioned earlier  5  Vitamin-D deficiency  Last known vitamin-D level was 20 from 12/6/2017  Currently patient is taking cholecalciferol 1000 Units PO daily  Recheck vitamin-D level today  Returns to Nephrology office in 3 months  Plan to check CBC, BMP, urine analysis along with urine microalbumin to creatinine ratio before next visit  Patient's daughter-Barrie can be reached at [phone]    Labs (reviewed on 10/9/2019)-called and discussed results with patient's daughter today  Stable creatinine at 1 71 with EGFR of 37  Hemoglobin 13 3  Urine analysis showed no dysuria  Urine microalbumin to creatinine ratio of 248 mg-> continue lisinopril 40 mg PO daily  Vitamin-D 41-> continue cholecalciferol 1000 Units PO daily  Normal PTH (53), uric acid (7 0), sodium, potassium, bicarb, calcium and phosphorus    NO CHANGE  Portions of the record may have been created with voice recognition software  Occasional wrong word or "sound a like" substitutions may have occurred due to the inherent limitations of voice recognition software  Read the chart carefully and recognize, using context, where substitutions have occurred  If you have any questions, please contact the dictating provider

## 2019-10-09 LAB
25(OH)D3 SERPL-MCNC: 41.5 NG/ML (ref 30–100)
CREAT UR-MCNC: 106 MG/DL
MICROALBUMIN UR-MCNC: 263 MG/L (ref 0–20)
MICROALBUMIN/CREAT 24H UR: 248 MG/G CREATININE (ref 0–30)
PTH-INTACT SERPL-MCNC: 53.1 PG/ML (ref 18.4–80.1)

## 2019-10-11 ENCOUNTER — OFFICE VISIT (OUTPATIENT)
Dept: INTERNAL MEDICINE CLINIC | Facility: CLINIC | Age: 84
End: 2019-10-11
Payer: MEDICARE

## 2019-10-11 VITALS
WEIGHT: 226 LBS | HEART RATE: 58 BPM | SYSTOLIC BLOOD PRESSURE: 148 MMHG | DIASTOLIC BLOOD PRESSURE: 68 MMHG | OXYGEN SATURATION: 96 % | BODY MASS INDEX: 33.47 KG/M2 | HEIGHT: 69 IN

## 2019-10-11 DIAGNOSIS — Z86.73 HISTORY OF CVA (CEREBROVASCULAR ACCIDENT): ICD-10-CM

## 2019-10-11 DIAGNOSIS — I10 ESSENTIAL HYPERTENSION: Primary | ICD-10-CM

## 2019-10-11 DIAGNOSIS — I25.10 CORONARY ARTERY DISEASE INVOLVING NATIVE CORONARY ARTERY OF NATIVE HEART WITHOUT ANGINA PECTORIS: ICD-10-CM

## 2019-10-11 DIAGNOSIS — N18.30 TYPE 2 DIABETES MELLITUS WITH STAGE 3 CHRONIC KIDNEY DISEASE, WITHOUT LONG-TERM CURRENT USE OF INSULIN (HCC): ICD-10-CM

## 2019-10-11 DIAGNOSIS — E11.22 TYPE 2 DIABETES MELLITUS WITH STAGE 3 CHRONIC KIDNEY DISEASE, WITHOUT LONG-TERM CURRENT USE OF INSULIN (HCC): ICD-10-CM

## 2019-10-11 DIAGNOSIS — N18.30 HYPERTENSIVE KIDNEY DISEASE WITH STAGE 3 CHRONIC KIDNEY DISEASE (HCC): ICD-10-CM

## 2019-10-11 DIAGNOSIS — I12.9 HYPERTENSIVE KIDNEY DISEASE WITH STAGE 3 CHRONIC KIDNEY DISEASE (HCC): ICD-10-CM

## 2019-10-11 PROCEDURE — 99214 OFFICE O/P EST MOD 30 MIN: CPT | Performed by: INTERNAL MEDICINE

## 2019-10-12 NOTE — PROGRESS NOTES
Assessment/Plan:  Regarding cardiac status he seems stable at the present time with no angina  He needs to follow up with his cardiologist     Neurologically stable  No recurrence stroke symptoms at this time  Remains on Plavix and aspirin       Blood sugars have been stable  Will give a flu shot today  See him back here in 2 or 3 months  1  Essential hypertension     2  Coronary artery disease involving native coronary artery of native heart without angina pectoris     3  History of CVA (cerebrovascular accident)     4  Hypertensive kidney disease with stage 3 chronic kidney disease ([facility] )     5  Type 2 diabetes mellitus with stage 3 chronic kidney disease, without long-term current use of insulin ([facility])         No orders of the defined types were placed in this encounter  Subjective:  He comes in for follow-up  He thinks he is doing fairly well  He comes in accompanied by his daughter  He lives with another woman  The children check in on him as often as possible but they do not live with him  They do the best they can watching how he takes his medicine  There is some concerns that he misses doses  The importance of this was stressed to them although there already know and her doing the best they can  He is rather noncompliant and resist being watched  Seems to be doing fairly well  Has not had any recurrent stroke symptoms  Denies any chest pain shortness of breath  Patient ID: Cristino Boateng is a 82 y o  male      HPI    The following portions of the patient's history were reviewed and updated as appropriate:   He has a past medical history of Acute ST elevation myocardial infarction [facility]), Aortic valve disorder, Benign prostatic hyperplasia with lower urinary tract symptoms, CAD (coronary artery disease), DM2 (diabetes mellitus, type 2) ([facility] ), History of colonoscopy, History of transfusion, History of transient cerebral ischemia, HLD (hyperlipidemia), HTN (hypertension), and Neuralgia ,  does not have any pertinent problems on file  ,   has a past surgical history that includes Total hip arthroplasty (Right); Hip hardware removal; Cardiac catheterization; Coronary angioplasty with stent (2008); Hand surgery; and Total hip arthroplasty (Bilateral)  ,  family history includes Emphysema in his father and sister  ,   reports that he has never smoked  He has never used smokeless tobacco  He reports that he does not drink alcohol or use drugs  ,  is allergic to celecoxib; hydromorphone; pravastatin; and statins       Current Outpatient Medications:     amLODIPine (NORVASC) 10 mg tablet, TAKE 1 TABLET BY MOUTH  DAILY, Disp: 90 tablet, Rfl: 1    aspirin (ECOTRIN LOW STRENGTH) 81 mg EC tablet, 1 tab daily, Disp: 30 tablet, Rfl: 4    Blood Glucose Monitoring Suppl (ONE TOUCH ULTRA MINI) w/Device KIT, by Does not apply route, Disp: , Rfl:     cholecalciferol (VITAMIN D3) 1,000 units tablet, Take 1,000 Units by mouth daily, Disp: , Rfl:     clopidogrel (PLAVIX) 75 mg tablet, TAKE 1 TABLET BY MOUTH  DAILY, Disp: 90 tablet, Rfl: 1    Cyanocobalamin ER (RA VITAMIN B-12) 1000 MCG TBCR, Take 2 tablets by mouth daily at bedtime  , Disp: , Rfl:     doxazosin (CARDURA) 8 MG tablet, Take 1 tablet (8 mg total) by mouth daily, Disp: 90 tablet, Rfl: 2    gabapentin (NEURONTIN) 100 mg capsule, Take 1 capsule (100 mg total) by mouth 2 (two) times a day for 90 days, Disp: 180 capsule, Rfl: 3    glucose blood (ONE TOUCH ULTRA TEST) test strip, Test once daily, Disp: 100 each, Rfl: 5    glyBURIDE (DIABETA) 5 mg tablet, Take 1 tablet (5 mg total) by mouth daily, Disp: 90 tablet, Rfl: 0    lisinopril (ZESTRIL) 40 mg tablet, TAKE 1 TABLET BY MOUTH  DAILY, Disp: 90 tablet, Rfl: 1    metoprolol tartrate (LOPRESSOR) 100 mg tablet, TAKE ONE-HALF TABLET BY  MOUTH TWICE A DAY, Disp: 90 tablet, Rfl: 3    pyridoxine (RA VITAMIN B-6) 100 MG tablet, Take 1 tablet by mouth daily, Disp: , Rfl:     Omega-3 Fatty Acids (FISH OIL PO), Take 1 capsule by mouth Daily, Disp: , Rfl:     Review of Systems   Constitutional: Positive for fatigue  Negative for activity change, appetite change, chills, diaphoresis, fever and unexpected weight change  HENT: Negative for congestion, ear pain, hearing loss, mouth sores, nosebleeds, postnasal drip, sinus pressure, sinus pain, sore throat and trouble swallowing  Eyes: Negative for pain, discharge and visual disturbance  Respiratory: Negative for apnea, cough, chest tightness, shortness of breath and wheezing  Cardiovascular: Negative for chest pain, palpitations and leg swelling  Gastrointestinal: Negative for abdominal pain, anal bleeding, blood in stool, constipation, diarrhea, nausea and vomiting  Endocrine: Negative for polydipsia and polyphagia  Genitourinary: Negative for decreased urine volume, dysuria, flank pain, frequency, hematuria and urgency  Musculoskeletal: Negative for arthralgias, back pain, gait problem, joint swelling and myalgias  Skin: Negative for rash and wound  Allergic/Immunologic: Negative for environmental allergies and food allergies  Neurological: Negative for dizziness, tremors, seizures, syncope, speech difficulty, light-headedness, numbness and headaches  Status post CVA  Hematological: Negative for adenopathy  Does not bruise/bleed easily  Psychiatric/Behavioral: Negative for agitation, confusion, hallucinations, sleep disturbance and suicidal ideas  The patient is not nervous/anxious  Objective:  /68 (BP Location: Left arm, Patient Position: Sitting, Cuff Size: Standard)   Pulse 58   Ht 5' 9" (1 753 m)   Wt 103 kg (226 lb)   SpO2 96%   BMI 33 37 kg/m²      Physical Exam   Constitutional: He appears well-developed and well-nourished  No distress  Awake alert  Blood pressure is 130/70   129/69 with his cuff  Rhythm is regular  Rate is 70  Respirations are 20  HENT:   Head: Normocephalic     Right Ear: External ear normal    Left Ear: External ear normal    Nose: Nose normal    Mouth/Throat: Oropharynx is clear and moist  No oropharyngeal exudate  Eyes: Pupils are equal, round, and reactive to light  Conjunctivae and EOM are normal  Right eye exhibits no discharge  Left eye exhibits no discharge  Neck: Normal range of motion  Neck supple  No thyromegaly present  Cardiovascular: Normal rate, regular rhythm, normal heart sounds and intact distal pulses  Exam reveals no gallop and no friction rub  No murmur heard  Pulmonary/Chest: Effort normal and breath sounds normal  No respiratory distress  He has no wheezes  He has no rales  Abdominal: Soft  Bowel sounds are normal  He exhibits no distension and no mass  There is no tenderness  There is no rebound and no guarding  Musculoskeletal: Normal range of motion  He exhibits no edema, tenderness or deformity  Lymphadenopathy:     He has no cervical adenopathy  Neurological: He is alert  He has normal reflexes  He displays normal reflexes  No cranial nerve deficit  Coordination normal    Speech is fluent  Moves all extremities  Skin: Skin is warm and dry  No rash noted  No erythema  Psychiatric: He has a normal mood and affect  His behavior is normal  Judgment and thought content normal    Nursing note and vitals reviewed          Recent Results (from the past 1008 hour(s))   CBC and differential    Collection Time: 09/09/19  9:01 AM   Result Value Ref Range    WBC 5 63 4 31 - 10 16 Thousand/uL    RBC 4 26 3 88 - 5 62 Million/uL    Hemoglobin 13 3 12 0 - 17 0 g/dL    Hematocrit 41 7 36 5 - 49 3 %    MCV 98 82 - 98 fL    MCH 31 2 26 8 - 34 3 pg    MCHC 31 9 31 4 - 37 4 g/dL    RDW 12 7 11 6 - 15 1 %    MPV 9 9 8 9 - 12 7 fL    Platelets 762 683 - 990 Thousands/uL    nRBC 0 /100 WBCs    Neutrophils Relative 59 43 - 75 %    Immat GRANS % 0 0 - 2 %    Lymphocytes Relative 25 14 - 44 %    Monocytes Relative 9 4 - 12 %    Eosinophils Relative 6 0 - 6 % Basophils Relative 1 0 - 1 %    Neutrophils Absolute 3 34 1 85 - 7 62 Thousands/µL    Immature Grans Absolute 0 01 0 00 - 0 20 Thousand/uL    Lymphocytes Absolute 1 41 0 60 - 4 47 Thousands/µL    Monocytes Absolute 0 49 0 17 - 1 22 Thousand/µL    Eosinophils Absolute 0 32 0 00 - 0 61 Thousand/µL    Basophils Absolute 0 06 0 00 - 0 10 Thousands/µL   Comprehensive metabolic panel    Collection Time: 09/09/19  9:01 AM   Result Value Ref Range    Sodium 139 136 - 145 mmol/L    Potassium 4 5 3 5 - 5 3 mmol/L    Chloride 105 100 - 108 mmol/L    CO2 28 21 - 32 mmol/L    ANION GAP 6 4 - 13 mmol/L    BUN 32 (H) 5 - 25 mg/dL    Creatinine 1 82 (H) 0 60 - 1 30 mg/dL    Glucose, Fasting 111 (H) 65 - 99 mg/dL    Calcium 9 0 8 3 - 10 1 mg/dL    AST 14 5 - 45 U/L    ALT 20 12 - 78 U/L    Alkaline Phosphatase 56 46 - 116 U/L    Total Protein 7 5 6 4 - 8 2 g/dL    Albumin 3 9 3 5 - 5 0 g/dL    Total Bilirubin 0 50 0 20 - 1 00 mg/dL    eGFR 34 ml/min/1 73sq m   HEMOGLOBIN A1C W/ EAG ESTIMATION    Collection Time: 09/09/19  9:01 AM   Result Value Ref Range    Hemoglobin A1C 6 4 (H) 4 2 - 6 3 %     mg/dl   Lipid panel    Collection Time: 09/09/19  9:01 AM   Result Value Ref Range    Cholesterol 175 50 - 200 mg/dL    Triglycerides 77 <=150 mg/dL    HDL, Direct 47 40 - 60 mg/dL    LDL Calculated 113 (H) 0 - 100 mg/dL    Non-HDL-Chol (CHOL-HDL) 128 mg/dl   CBC and differential    Collection Time: 10/08/19  4:08 PM   Result Value Ref Range    WBC 6 05 4 31 - 10 16 Thousand/uL    RBC 4 27 3 88 - 5 62 Million/uL    Hemoglobin 13 3 12 0 - 17 0 g/dL    Hematocrit 41 1 36 5 - 49 3 %    MCV 96 82 - 98 fL    MCH 31 1 26 8 - 34 3 pg    MCHC 32 4 31 4 - 37 4 g/dL    RDW 12 8 11 6 - 15 1 %    MPV 9 8 8 9 - 12 7 fL    Platelets 220 236 - 507 Thousands/uL    nRBC 0 /100 WBCs    Neutrophils Relative 52 43 - 75 %    Immat GRANS % 1 0 - 2 %    Lymphocytes Relative 33 14 - 44 %    Monocytes Relative 8 4 - 12 %    Eosinophils Relative 5 0 - 6 % Basophils Relative 1 0 - 1 %    Neutrophils Absolute 3 23 1 85 - 7 62 Thousands/µL    Immature Grans Absolute 0 03 0 00 - 0 20 Thousand/uL    Lymphocytes Absolute 1 97 0 60 - 4 47 Thousands/µL    Monocytes Absolute 0 49 0 17 - 1 22 Thousand/µL    Eosinophils Absolute 0 29 0 00 - 0 61 Thousand/µL    Basophils Absolute 0 04 0 00 - 0 10 Thousands/µL   Basic metabolic panel    Collection Time: 10/08/19  4:08 PM   Result Value Ref Range    Sodium 137 136 - 145 mmol/L    Potassium 4 8 3 5 - 5 3 mmol/L    Chloride 103 100 - 108 mmol/L    CO2 29 21 - 32 mmol/L    ANION GAP 5 4 - 13 mmol/L    BUN 35 (H) 5 - 25 mg/dL    Creatinine 1 71 (H) 0 60 - 1 30 mg/dL    Glucose 108 65 - 140 mg/dL    Calcium 9 5 8 3 - 10 1 mg/dL    eGFR 37 ml/min/1 73sq m   Phosphorus    Collection Time: 10/08/19  4:08 PM   Result Value Ref Range    Phosphorus 3 8 2 3 - 4 1 mg/dL   Uric acid    Collection Time: 10/08/19  4:08 PM   Result Value Ref Range    Uric Acid 7 0 4 2 - 8 0 mg/dL   PTH, intact    Collection Time: 10/08/19  4:08 PM   Result Value Ref Range    PTH 53 1 18 4 - 80 1 pg/mL   Vitamin D 25 hydroxy    Collection Time: 10/08/19  4:08 PM   Result Value Ref Range    Vit D, 25-Hydroxy 41 5 30 0 - 100 0 ng/mL   Urinalysis with microscopic    Collection Time: 10/08/19  4:22 PM   Result Value Ref Range    Clarity, UA Clear     Color, UA Yellow     Specific [place], UA 1 010 1 003 - 1 030    pH, UA 5 5 4 5, 5 0, 5 5, 6 0, 6 5, 7 0, 7 5, 8 0    Glucose, UA Negative Negative mg/dl    Ketones, UA Negative Negative mg/dl    Blood, UA Negative Negative    Protein, UA 30 (1+) (A) Negative mg/dl    Nitrite, UA Negative Negative    Bilirubin, UA Negative Negative    Urobilinogen, UA 0 2 0 2, 1 0 E U /dl E U /dl    Leukocytes, UA Negative Negative    WBC, UA None Seen None Seen, 0-5, 5-55, 5-65 /hpf    RBC, UA None Seen None Seen, 0-5 /hpf    Hyaline Casts, UA 0-1 (A) (none) /lpf    Bacteria, UA None Seen None Seen, Occasional /hpf    Epithelial Cells Occasional None Seen, Occasional /hpf   Microalbumin / creatinine urine ratio    Collection Time: 10/08/19  4:22 PM   Result Value Ref Range    Creatinine, Ur 106 0 mg/dL    Microalbum  ,U,Random 263 0 (H) 0 0 - 20 0 mg/L    Microalb Creat Ratio 248 (H) 0 - 30 mg/g creatinine

## 2019-10-25 DIAGNOSIS — I10 ESSENTIAL HYPERTENSION: ICD-10-CM

## 2019-10-25 DIAGNOSIS — E11.9 TYPE 2 DIABETES MELLITUS WITHOUT COMPLICATION, WITHOUT LONG-TERM CURRENT USE OF INSULIN (HCC): ICD-10-CM

## 2019-10-25 RX ORDER — GLYBURIDE 5 MG/1
5 TABLET ORAL DAILY
Qty: 90 TABLET | Refills: 0 | Status: CANCELLED | OUTPATIENT
Start: 2019-10-25 | End: 2020-01-23

## 2019-10-25 RX ORDER — LISINOPRIL 40 MG/1
40 TABLET ORAL DAILY
Qty: 90 TABLET | Refills: 3 | Status: SHIPPED | OUTPATIENT
Start: 2019-10-25 | End: 2021-01-21 | Stop reason: SDUPTHER

## 2019-10-25 RX ORDER — GLYBURIDE 5 MG/1
5 TABLET ORAL DAILY
Qty: 90 TABLET | Refills: 0 | Status: SHIPPED | OUTPATIENT
Start: 2019-10-25 | End: 2019-12-10

## 2019-10-25 NOTE — TELEPHONE ENCOUNTER
America Melloy's daughter call and said Ethan Viera a patient of Dr Kevin Downey needs a refill for Glyburide 5 mg tablet; for type 2 diabetes mellitus w/o complication; take 1 tablet daily; qty 80; Ethan Viera is completely out  Mya Lai said that Ethan Viera received a letter from [facility] and it says the Glyburide 5 mg tablet is not a cover prescription   Mya Lai phone number is [phone]

## 2019-10-25 NOTE — TELEPHONE ENCOUNTER
Florence Hatfield, patient's daughter called asking for a refill of his Lisinopril  40mg   Please send a 9- day supplies with refills is possible  [facility]     [facility] ZMICHELLE#[phone]

## 2019-10-29 ENCOUNTER — TRANSCRIBE ORDERS (OUTPATIENT)
Dept: LAB | Facility: CLINIC | Age: 84
End: 2019-10-29

## 2019-10-29 ENCOUNTER — APPOINTMENT (OUTPATIENT)
Dept: LAB | Facility: CLINIC | Age: 84
End: 2019-10-29
Payer: MEDICARE

## 2019-10-29 DIAGNOSIS — R97.20 ELEVATED PROSTATE SPECIFIC ANTIGEN (PSA): ICD-10-CM

## 2019-10-29 DIAGNOSIS — R97.20 ELEVATED PROSTATE SPECIFIC ANTIGEN (PSA): Primary | ICD-10-CM

## 2019-10-29 LAB — PSA SERPL-MCNC: 5.8 NG/ML (ref 0–4)

## 2019-10-29 PROCEDURE — 36415 COLL VENOUS BLD VENIPUNCTURE: CPT

## 2019-10-29 PROCEDURE — G0103 PSA SCREENING: HCPCS

## 2019-11-07 NOTE — TELEPHONE ENCOUNTER
Called Raman Reyes and let her know to call the PCP for Tom's glyburide due to us not managing his diabetes    She understood and had no further questions

## 2019-11-18 ENCOUNTER — TELEPHONE (OUTPATIENT)
Dept: CARDIOLOGY CLINIC | Facility: CLINIC | Age: 84
End: 2019-11-18

## 2019-11-18 ENCOUNTER — TELEPHONE (OUTPATIENT)
Dept: INTERNAL MEDICINE CLINIC | Facility: CLINIC | Age: 84
End: 2019-11-18

## 2019-11-18 NOTE — TELEPHONE ENCOUNTER
Pt's daughter called  Pt is having sob whiling walking  This has been going on for a couple of weeks  Daughter Ben Clark is concerned    Pt would like to come in asap or should he go to the ED  Called dr Bonita Dickson office, dr Bonita Dickson said to call dr Faith Cordova office  Daughter Pedro Green phone number is  please call her

## 2019-11-19 ENCOUNTER — OFFICE VISIT (OUTPATIENT)
Dept: CARDIOLOGY CLINIC | Facility: CLINIC | Age: 84
End: 2019-11-19
Payer: MEDICARE

## 2019-11-19 ENCOUNTER — APPOINTMENT (OUTPATIENT)
Dept: LAB | Facility: HOSPITAL | Age: 84
End: 2019-11-19
Attending: INTERNAL MEDICINE
Payer: MEDICARE

## 2019-11-19 ENCOUNTER — TELEPHONE (OUTPATIENT)
Dept: CARDIOLOGY CLINIC | Facility: CLINIC | Age: 84
End: 2019-11-19

## 2019-11-19 VITALS
HEART RATE: 52 BPM | OXYGEN SATURATION: 96 % | WEIGHT: 222 LBS | BODY MASS INDEX: 32.88 KG/M2 | SYSTOLIC BLOOD PRESSURE: 150 MMHG | DIASTOLIC BLOOD PRESSURE: 70 MMHG | HEIGHT: 69 IN

## 2019-11-19 DIAGNOSIS — Z51.81 ENCOUNTER FOR MONITORING ANTIPLATELET THERAPY: ICD-10-CM

## 2019-11-19 DIAGNOSIS — I25.10 CORONARY ARTERY DISEASE INVOLVING NATIVE CORONARY ARTERY OF NATIVE HEART WITHOUT ANGINA PECTORIS: Primary | ICD-10-CM

## 2019-11-19 DIAGNOSIS — R06.02 SHORTNESS OF BREATH: ICD-10-CM

## 2019-11-19 DIAGNOSIS — I10 ESSENTIAL HYPERTENSION: ICD-10-CM

## 2019-11-19 DIAGNOSIS — Z79.02 ENCOUNTER FOR MONITORING ANTIPLATELET THERAPY: ICD-10-CM

## 2019-11-19 LAB
ALBUMIN SERPL BCP-MCNC: 3.7 G/DL (ref 3.5–5)
ALP SERPL-CCNC: 59 U/L (ref 46–116)
ALT SERPL W P-5'-P-CCNC: 21 U/L (ref 12–78)
ANION GAP SERPL CALCULATED.3IONS-SCNC: 5 MMOL/L (ref 4–13)
AST SERPL W P-5'-P-CCNC: 17 U/L (ref 5–45)
BASOPHILS # BLD AUTO: 0.04 THOUSANDS/ΜL (ref 0–0.1)
BASOPHILS NFR BLD AUTO: 1 % (ref 0–1)
BILIRUB SERPL-MCNC: 0.3 MG/DL (ref 0.2–1)
BUN SERPL-MCNC: 31 MG/DL (ref 5–25)
CALCIUM SERPL-MCNC: 9.6 MG/DL (ref 8.3–10.1)
CHLORIDE SERPL-SCNC: 103 MMOL/L (ref 100–108)
CO2 SERPL-SCNC: 31 MMOL/L (ref 21–32)
CREAT SERPL-MCNC: 1.87 MG/DL (ref 0.6–1.3)
EOSINOPHIL # BLD AUTO: 0.35 THOUSAND/ΜL (ref 0–0.61)
EOSINOPHIL NFR BLD AUTO: 6 % (ref 0–6)
ERYTHROCYTE [DISTWIDTH] IN BLOOD BY AUTOMATED COUNT: 12.9 % (ref 11.6–15.1)
GFR SERPL CREATININE-BSD FRML MDRD: 33 ML/MIN/1.73SQ M
GLUCOSE SERPL-MCNC: 104 MG/DL (ref 65–140)
HCT VFR BLD AUTO: 40.8 % (ref 36.5–49.3)
HGB BLD-MCNC: 13.1 G/DL (ref 12–17)
IMM GRANULOCYTES # BLD AUTO: 0.01 THOUSAND/UL (ref 0–0.2)
IMM GRANULOCYTES NFR BLD AUTO: 0 % (ref 0–2)
INR PPP: 1.06 (ref 0.84–1.19)
LYMPHOCYTES # BLD AUTO: 2.35 THOUSANDS/ΜL (ref 0.6–4.47)
LYMPHOCYTES NFR BLD AUTO: 37 % (ref 14–44)
MCH RBC QN AUTO: 30.8 PG (ref 26.8–34.3)
MCHC RBC AUTO-ENTMCNC: 32.1 G/DL (ref 31.4–37.4)
MCV RBC AUTO: 96 FL (ref 82–98)
MONOCYTES # BLD AUTO: 0.55 THOUSAND/ΜL (ref 0.17–1.22)
MONOCYTES NFR BLD AUTO: 9 % (ref 4–12)
NEUTROPHILS # BLD AUTO: 3.09 THOUSANDS/ΜL (ref 1.85–7.62)
NEUTS SEG NFR BLD AUTO: 47 % (ref 43–75)
NRBC BLD AUTO-RTO: 0 /100 WBCS
PLATELET # BLD AUTO: 177 THOUSANDS/UL (ref 149–390)
PMV BLD AUTO: 10.1 FL (ref 8.9–12.7)
POTASSIUM SERPL-SCNC: 5.1 MMOL/L (ref 3.5–5.3)
PROT SERPL-MCNC: 7.6 G/DL (ref 6.4–8.2)
PROTHROMBIN TIME: 13.9 SECONDS (ref 11.6–14.5)
RBC # BLD AUTO: 4.25 MILLION/UL (ref 3.88–5.62)
SODIUM SERPL-SCNC: 139 MMOL/L (ref 136–145)
WBC # BLD AUTO: 6.39 THOUSAND/UL (ref 4.31–10.16)

## 2019-11-19 PROCEDURE — 85610 PROTHROMBIN TIME: CPT | Performed by: INTERNAL MEDICINE

## 2019-11-19 PROCEDURE — 99214 OFFICE O/P EST MOD 30 MIN: CPT | Performed by: INTERNAL MEDICINE

## 2019-11-19 PROCEDURE — 80053 COMPREHEN METABOLIC PANEL: CPT | Performed by: INTERNAL MEDICINE

## 2019-11-19 PROCEDURE — 93000 ELECTROCARDIOGRAM COMPLETE: CPT | Performed by: INTERNAL MEDICINE

## 2019-11-19 PROCEDURE — 36415 COLL VENOUS BLD VENIPUNCTURE: CPT | Performed by: INTERNAL MEDICINE

## 2019-11-19 PROCEDURE — 85025 COMPLETE CBC W/AUTO DIFF WBC: CPT | Performed by: INTERNAL MEDICINE

## 2019-11-19 NOTE — H&P (VIEW-ONLY)
[facility] [place]  [facility] [id number]  Cardiology Follow Up    Buddy Morton  1935  [id number]      1  Coronary artery disease involving native coronary artery of native heart without angina pectoris  Cardiac coronary angio/lhc/pci    POCT ECG    CBC and differential    Comprehensive metabolic panel    Protime-INR   2  Encounter for monitoring antiplatelet therapy     3  Shortness of breath  Cardiac coronary angio/lhc/pci    POCT ECG   4  Essential hypertension         Chief Complaint   Patient presents with    Shortness of Breath     for a while       Interval History:  Patient presents with symptoms of shortness of breath with minimal exertion  Patient and family states that his symptoms have gotten progressively worse  Patient has history of coronary artery disease status post stents in the past   Patient also has history of diabetes, hypertension, hyperlipidemia as well as history of stroke  Patient states that he has been compliant with all his present medications  Patient also has history of mild aortic stenosis  Patient had an echocardiogram as well as pharmacological stress test earlier this year  Stress test at that time showed fixed defect no ischemia  Echocardiogram showed mild aortic stenosis      Patient Active Problem List   Diagnosis    [facility]    HTN (hypertension)    DM2 (diabetes mellitus, type 2) ([facility] )    HLD (hyperlipidemia)    CAD (coronary artery disease)    Right hip pain    Acute deep vein thrombosis (DVT) of brachial vein of left upper extremity ([facility] )    At risk for polypharmacy    Encounter for monitoring antiplatelet therapy    Nonrheumatic aortic valve stenosis    Stage 3 chronic kidney disease ([facility] )    History of CVA (cerebrovascular accident)    Atrial fibrillation (HCC)    Hemiplegia and 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-dominant side (HCC)    Type 2 diabetes mellitus without complication ([place] [address] )    Type 2 diabetes mellitus with kidney complication, without long-term current use of insulin ([place] [address] )    Poorly-controlled hypertension    Hypertensive kidney disease with stage 3 chronic kidney disease ([place] [address] )    Other proteinuria     Past Medical History:   Diagnosis Date    Acute ST elevation myocardial infarction [facility])     last assessed: 04/15/2016    Aortic valve disorder     Benign prostatic hyperplasia with lower urinary tract symptoms     CAD (coronary artery disease)     DM2 (diabetes mellitus, type 2) ([place] [address] )     History of colonoscopy     resolved: 05/08/2012    History of transfusion     History of transient cerebral ischemia     HLD (hyperlipidemia)     HTN (hypertension)     Neuralgia      Social History     Socioeconomic History    Marital status:       Spouse name: Not on file    Number of children: Not on file    Years of education: Not on file    Highest education level: Not on file   Occupational History    Not on file   Social Needs    Financial resource strain: Not on file    Food insecurity:     Worry: Not on file     Inability: Not on file    Transportation needs:     Medical: Not on file     Non-medical: Not on file   Tobacco Use    Smoking status: Never Smoker    Smokeless tobacco: Never Used   Substance and Sexual Activity    Alcohol use: No    Drug use: No    Sexual activity: Not Currently     Partners: Female     Birth control/protection: None   Lifestyle    Physical activity:     Days per week: 0 days     Minutes per session: 0 min    Stress: Not at all   Relationships    Social connections:     Talks on phone: Not on file     Gets together: Not on file     Attends Hoahaoism service: Not on file     Active member of club or organization: Not on file     Attends meetings of clubs or organizations: Not on file     Relationship status: Not on file    Intimate partner violence:     Fear of current or ex partner: Not on file     Emotionally abused: Not on file     Physically abused: Not on file     Forced sexual activity: Not on file   Other Topics Concern    Not on file   Social History Narrative    Active advance directive (as per [employer])     No advance directive on file (as per [employer])       Family History   Problem Relation Age of Onset    Emphysema Father     Emphysema Sister      Past Surgical History:   Procedure Laterality Date    CARDIAC CATHETERIZATION      Outcome: successful; last assessed: 02/03/2015    CORONARY ANGIOPLASTY WITH STENT PLACEMENT  2008    stent to LAD     HAND SURGERY      thumb    HIP HARDWARE REMOVAL      TOTAL HIP ARTHROPLASTY Right     TOTAL HIP ARTHROPLASTY Bilateral        Current Outpatient Medications:     amLODIPine (NORVASC) 10 mg tablet, TAKE 1 TABLET BY MOUTH  DAILY, Disp: 90 tablet, Rfl: 1    aspirin (ECOTRIN LOW STRENGTH) 81 mg EC tablet, 1 tab daily, Disp: 30 tablet, Rfl: 4    Blood Glucose Monitoring Suppl (ONE TOUCH ULTRA MINI) w/Device KIT, by Does not apply route, Disp: , Rfl:     cholecalciferol (VITAMIN D3) 1,000 units tablet, Take 1,000 Units by mouth daily, Disp: , Rfl:     clopidogrel (PLAVIX) 75 mg tablet, TAKE 1 TABLET BY MOUTH  DAILY, Disp: 90 tablet, Rfl: 1    Cyanocobalamin ER (RA VITAMIN B-12) 1000 MCG TBCR, Take 2 tablets by mouth daily at bedtime  , Disp: , Rfl:     doxazosin (CARDURA) 8 MG tablet, Take 1 tablet (8 mg total) by mouth daily, Disp: 90 tablet, Rfl: 2    gabapentin (NEURONTIN) 100 mg capsule, Take 1 capsule (100 mg total) by mouth 2 (two) times a day for 90 days, Disp: 180 capsule, Rfl: 3    glyBURIDE (DIABETA) 5 mg tablet, Take 1 tablet (5 mg total) by mouth daily, Disp: 90 tablet, Rfl: 0    lisinopril (ZESTRIL) 40 mg tablet, Take 1 tablet (40 mg total) by mouth daily, Disp: 90 tablet, Rfl: 3    metoprolol tartrate (LOPRESSOR) 100 mg tablet, TAKE ONE-HALF TABLET BY  MOUTH TWICE A DAY, Disp: 90 tablet, Rfl: 3    Omega-3 Fatty Acids (FISH OIL PO), Take 1 capsule by mouth Daily, Disp: , Rfl:     pyridoxine (RA VITAMIN B-6) 100 MG tablet, Take 1 tablet by mouth daily, Disp: , Rfl:     glucose blood (ONE TOUCH ULTRA TEST) test strip, Test once daily, Disp: 100 each, Rfl: 5  Allergies   Allergen Reactions    Celecoxib     Hydromorphone     Pravastatin Hives    Statins        Labs:  Appointment on 10/29/2019   Component Date Value    PSA 10/29/2019 5 8*   Appointment on 10/08/2019   Component Date Value    WBC 10/08/2019 6 05     RBC 10/08/2019 4 27     Hemoglobin 10/08/2019 13 3     Hematocrit 10/08/2019 41 1     MCV 10/08/2019 96     MCH 10/08/2019 31 1     MCHC 10/08/2019 32 4     RDW 10/08/2019 12 8     MPV 10/08/2019 9 8     Platelets 16/65/6324 196     nRBC 10/08/2019 0     Neutrophils Relative 10/08/2019 52     Immat GRANS % 10/08/2019 1     Lymphocytes Relative 10/08/2019 33     Monocytes Relative 10/08/2019 8     Eosinophils Relative 10/08/2019 5     Basophils Relative 10/08/2019 1     Neutrophils Absolute 10/08/2019 3 23     Immature Grans Absolute 10/08/2019 0 03     Lymphocytes Absolute 10/08/2019 1 97     Monocytes Absolute 10/08/2019 0 49     Eosinophils Absolute 10/08/2019 0 29     Basophils Absolute 10/08/2019 0 04     Sodium 10/08/2019 137     Potassium 10/08/2019 4 8     Chloride 10/08/2019 103     CO2 10/08/2019 29     ANION GAP 10/08/2019 5     BUN 10/08/2019 35*    Creatinine 10/08/2019 1 71*    Glucose 10/08/2019 108     Calcium 10/08/2019 9 5     eGFR 10/08/2019 37     Clarity, UA 10/08/2019 Clear     Color, UA 10/08/2019 Yellow     Specific Gravity, UA 10/08/2019 1 010     pH, UA 10/08/2019 5 5     Glucose, UA 10/08/2019 Negative     Ketones, UA 10/08/2019 Negative     Blood, UA 10/08/2019 Negative     Protein, UA 10/08/2019 30 (1+)*    Nitrite, UA 10/08/2019 Negative     Bilirubin, UA 10/08/2019 Negative     Urobilinogen, UA 10/08/2019 0 2     Leukocytes, UA 10/08/2019 Negative     WBC, UA 10/08/2019 None Seen     RBC, UA 10/08/2019 None Seen     Hyaline Casts, UA 10/08/2019 0-1*    Bacteria, UA 10/08/2019 None Seen     Epithelial Cells 10/08/2019 Occasional     Creatinine, Ur 10/08/2019 106 0     Microalbum  ,U,Random 10/08/2019 263 0*    Microalb Creat Ratio 10/08/2019 248*    Phosphorus 10/08/2019 3 8     Uric Acid 10/08/2019 7 0     PTH 10/08/2019 53 1     Vit D, 25-Hydroxy 10/08/2019 41 5      Imaging: No results found  Review of Systems:  Review of Systems    Physical Exam:    /70   Pulse (!) 52   Ht 5' 9" (1 753 m)   Wt 101 kg (222 lb)   SpO2 96%   BMI 32 78 kg/m²     Physical Exam   PHYSICAL EXAM:  General:  Patient is not in acute distress   Head: Normocephalic, Atraumatic  HEENT:  Both pupils normal-size atraumatic, normocephalic, nonicteric  Neck:  JVP not raised  Trachea central  No carotid bruit  Respiratory:  Decreased breath sounds bilaterally  Cardiovascular:  Regular rate and rhythm no S3 3/6 systolic ejection murmur in the right sternal border  GI:  Abdomen soft nontender  No organomegaly  Lymphatic:  No cervical or inguinal lymphadenopathy  Neurologic:  Patient is awake alert, oriented   Grossly nonfocal  Extremities no edema      EKG shows sinus bradycardia right bundle-branch block with inferior wall infarct age indeterminate      Discussion/Summary:  Patient with known history of CAD status post stents in the past with multiple risk factors for coronary artery disease including diabetes, hypertension, hyperlipidemia, obesity with symptoms of shortness of breath with minimal exertion  This could represent angina equivalent in a patient with history of diabetes  Patient already had a pharmacological stress test a few months ago  Given ongoing symptoms, options of cardiac catheterization discussed at length with patient and family      Risks and benefits of cardiac catheterization and possible revascularization options including but not limited to risk of infection, bleeding, risk of myocardial infarction, stroke, and risk of death discussed at length with patient  Patient understands the risks and benefits and wishes to proceed  Cardiac catheterization will be scheduled   Preprocedure workup will be done  Further cardiac evaluation and management after the results of cardiac catheterization  Patient will be higher than usual risk based on advanced age, history of stroke as well as chronic kidney disease  Patient has been instructed to hold glyburide as well as lisinopril on the day of cardiac catheterization  Patient will need hydration per JEANNINE protocol as well as LVEDP guided follow-up hydration  Follow-up in 3 weeks or earlier as needed  Time 25 minutes  50% of the time was spent in counseling and coordination of care

## 2019-11-19 NOTE — TELEPHONE ENCOUNTER
----- Message from Gary Grover MD sent at 11/19/2019  3:21 PM EST -----  Regarding: Schedule cardiac catheterization  Please schedule this patient for cardiac catheterization at [facility] this Friday or next Tuesday  Diagnosis CAD/shortness of breath  Patient has been instructed to hold his glyburide and furosemide the day of the procedure  Patient was given blood work slip  Patient does have CKD and needs JEANNINE protocol for hydration

## 2019-11-19 NOTE — PROGRESS NOTES
[facility] [place]  [address] [facility] [id number]  Cardiology Follow Up    Tommy Frias  1935  [id number]      1  Coronary artery disease involving native coronary artery of native heart without angina pectoris  Cardiac coronary angio/lhc/pci    POCT ECG    CBC and differential    Comprehensive metabolic panel    Protime-INR   2  Encounter for monitoring antiplatelet therapy     3  Shortness of breath  Cardiac coronary angio/lhc/pci    POCT ECG   4  Essential hypertension         Chief Complaint   Patient presents with    Shortness of Breath     for a while       Interval History:  Patient presents with symptoms of shortness of breath with minimal exertion  Patient and family states that his symptoms have gotten progressively worse  Patient has history of coronary artery disease status post stents in the past   Patient also has history of diabetes, hypertension, hyperlipidemia as well as history of stroke  Patient states that he has been compliant with all his present medications  Patient also has history of mild aortic stenosis  Patient had an echocardiogram as well as pharmacological stress test earlier this year  Stress test at that time showed fixed defect no ischemia  Echocardiogram showed mild aortic stenosis      Patient Active Problem List   Diagnosis    [facility]    HTN (hypertension)    DM2 [facility]diabetes mellitus, type 2) [facility]    HLD (hyperlipidemia)    CAD (coronary artery disease)    Right hip pain    Acute deep vein thrombosis (DVT) of brachial vein of left upper extremity ([facility] )    At risk for polypharmacy    Encounter for monitoring antiplatelet therapy    Nonrheumatic aortic valve stenosis    Stage 3 chronic kidney disease ([facility] )    History of CVA (cerebrovascular accident)    Atrial fibrillation (HCC)    Hemiplegia and 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-dominant side (HCC)    Type 2 diabetes mellitus without complication ([place] [address] )    Type 2 diabetes mellitus with kidney complication, without long-term current use of insulin ([place] [address] )    Poorly-controlled hypertension    Hypertensive kidney disease with stage 3 chronic kidney disease ([place] [address] )    Other proteinuria     Past Medical History:   Diagnosis Date    Acute ST elevation myocardial infarction [facility])     last assessed: 04/15/2016    Aortic valve disorder     Benign prostatic hyperplasia with lower urinary tract symptoms     CAD (coronary artery disease)     DM2 (diabetes mellitus, type 2) ([place] [address] )     History of colonoscopy     resolved: 05/08/2012    History of transfusion     History of transient cerebral ischemia     HLD (hyperlipidemia)     HTN (hypertension)     Neuralgia      Social History     Socioeconomic History    Marital status:       Spouse name: Not on file    Number of children: Not on file    Years of education: Not on file    Highest education level: Not on file   Occupational History    Not on file   Social Needs    Financial resource strain: Not on file    Food insecurity:     Worry: Not on file     Inability: Not on file    Transportation needs:     Medical: Not on file     Non-medical: Not on file   Tobacco Use    Smoking status: Never Smoker    Smokeless tobacco: Never Used   Substance and Sexual Activity    Alcohol use: No    Drug use: No    Sexual activity: Not Currently     Partners: Female     Birth control/protection: None   Lifestyle    Physical activity:     Days per week: 0 days     Minutes per session: 0 min    Stress: Not at all   Relationships    Social connections:     Talks on phone: Not on file     Gets together: Not on file     Attends Cheondoism service: Not on file     Active member of club or organization: Not on file     Attends meetings of clubs or organizations: Not on file     Relationship status: Not on file    Intimate partner violence:     Fear of current or ex partner: Not on file     Emotionally abused: Not on file     Physically abused: Not on file     Forced sexual activity: Not on file   Other Topics Concern    Not on file   Social History Narrative    Active advance directive (as per [employer])     No advance directive on file (as per [employer])       Family History   Problem Relation Age of Onset    Emphysema Father     Emphysema Sister      Past Surgical History:   Procedure Laterality Date    CARDIAC CATHETERIZATION      Outcome: successful; last assessed: 02/03/2015    CORONARY ANGIOPLASTY WITH STENT PLACEMENT  2008    stent to LAD     HAND SURGERY      thumb    HIP HARDWARE REMOVAL      TOTAL HIP ARTHROPLASTY Right     TOTAL HIP ARTHROPLASTY Bilateral        Current Outpatient Medications:     amLODIPine (NORVASC) 10 mg tablet, TAKE 1 TABLET BY MOUTH  DAILY, Disp: 90 tablet, Rfl: 1    aspirin (ECOTRIN LOW STRENGTH) 81 mg EC tablet, 1 tab daily, Disp: 30 tablet, Rfl: 4    Blood Glucose Monitoring Suppl (ONE TOUCH ULTRA MINI) w/Device KIT, by Does not apply route, Disp: , Rfl:     cholecalciferol (VITAMIN D3) 1,000 units tablet, Take 1,000 Units by mouth daily, Disp: , Rfl:     clopidogrel (PLAVIX) 75 mg tablet, TAKE 1 TABLET BY MOUTH  DAILY, Disp: 90 tablet, Rfl: 1    Cyanocobalamin ER (RA VITAMIN B-12) 1000 MCG TBCR, Take 2 tablets by mouth daily at bedtime  , Disp: , Rfl:     doxazosin (CARDURA) 8 MG tablet, Take 1 tablet (8 mg total) by mouth daily, Disp: 90 tablet, Rfl: 2    gabapentin (NEURONTIN) 100 mg capsule, Take 1 capsule (100 mg total) by mouth 2 (two) times a day for 90 days, Disp: 180 capsule, Rfl: 3    glyBURIDE (DIABETA) 5 mg tablet, Take 1 tablet (5 mg total) by mouth daily, Disp: 90 tablet, Rfl: 0    lisinopril (ZESTRIL) 40 mg tablet, Take 1 tablet (40 mg total) by mouth daily, Disp: 90 tablet, Rfl: 3    metoprolol tartrate (LOPRESSOR) 100 mg tablet, TAKE ONE-HALF TABLET BY  MOUTH TWICE A DAY, Disp: 90 tablet, Rfl: 3    Omega-3 Fatty Acids (FISH OIL PO), Take 1 capsule by mouth Daily, Disp: , Rfl:     pyridoxine (RA VITAMIN B-6) 100 MG tablet, Take 1 tablet by mouth daily, Disp: , Rfl:     glucose blood (ONE TOUCH ULTRA TEST) test strip, Test once daily, Disp: 100 each, Rfl: 5  Allergies   Allergen Reactions    Celecoxib     Hydromorphone     Pravastatin Hives    Statins        Labs:  Appointment on 10/29/2019   Component Date Value    PSA 10/29/2019 5 8*   Appointment on 10/08/2019   Component Date Value    WBC 10/08/2019 6 05     RBC 10/08/2019 4 27     Hemoglobin 10/08/2019 13 3     Hematocrit 10/08/2019 41 1     MCV 10/08/2019 96     MCH 10/08/2019 31 1     MCHC 10/08/2019 32 4     RDW 10/08/2019 12 8     MPV 10/08/2019 9 8     Platelets 75/52/8120 196     nRBC 10/08/2019 0     Neutrophils Relative 10/08/2019 52     Immat GRANS % 10/08/2019 1     Lymphocytes Relative 10/08/2019 33     Monocytes Relative 10/08/2019 8     Eosinophils Relative 10/08/2019 5     Basophils Relative 10/08/2019 1     Neutrophils Absolute 10/08/2019 3 23     Immature Grans Absolute 10/08/2019 0 03     Lymphocytes Absolute 10/08/2019 1 97     Monocytes Absolute 10/08/2019 0 49     Eosinophils Absolute 10/08/2019 0 29     Basophils Absolute 10/08/2019 0 04     Sodium 10/08/2019 137     Potassium 10/08/2019 4 8     Chloride 10/08/2019 103     CO2 10/08/2019 29     ANION GAP 10/08/2019 5     BUN 10/08/2019 35*    Creatinine 10/08/2019 1 71*    Glucose 10/08/2019 108     Calcium 10/08/2019 9 5     eGFR 10/08/2019 37     Clarity, UA 10/08/2019 Clear     Color, UA 10/08/2019 Yellow     Specific Gravity, UA 10/08/2019 1 010     pH, UA 10/08/2019 5 5     Glucose, UA 10/08/2019 Negative     Ketones, UA 10/08/2019 Negative     Blood, UA 10/08/2019 Negative     Protein, UA 10/08/2019 30 (1+)*    Nitrite, UA 10/08/2019 Negative     Bilirubin, UA 10/08/2019 Negative     Urobilinogen, UA 10/08/2019 0 2     Leukocytes, UA 10/08/2019 Negative     WBC, UA 10/08/2019 None Seen     RBC, UA 10/08/2019 None Seen     Hyaline Casts, UA 10/08/2019 0-1*    Bacteria, UA 10/08/2019 None Seen     Epithelial Cells 10/08/2019 Occasional     Creatinine, Ur 10/08/2019 106 0     Microalbum  ,U,Random 10/08/2019 263 0*    Microalb Creat Ratio 10/08/2019 248*    Phosphorus 10/08/2019 3 8     Uric Acid 10/08/2019 7 0     PTH 10/08/2019 53 1     Vit D, 25-Hydroxy 10/08/2019 41 5      Imaging: No results found  Review of Systems:  Review of Systems    Physical Exam:    /70   Pulse (!) 52   Ht 5' 9" (1 753 m)   Wt 101 kg (222 lb)   SpO2 96%   BMI 32 78 kg/m²     Physical Exam   PHYSICAL EXAM:  General:  Patient is not in acute distress   Head: Normocephalic, Atraumatic  HEENT:  Both pupils normal-size atraumatic, normocephalic, nonicteric  Neck:  JVP not raised  Trachea central  No carotid bruit  Respiratory:  Decreased breath sounds bilaterally  Cardiovascular:  Regular rate and rhythm no S3 3/6 systolic ejection murmur in the right sternal border  GI:  Abdomen soft nontender  No organomegaly  Lymphatic:  No cervical or inguinal lymphadenopathy  Neurologic:  Patient is awake alert, oriented   Grossly nonfocal  Extremities no edema      EKG shows sinus bradycardia right bundle-branch block with inferior wall infarct age indeterminate      Discussion/Summary:  Patient with known history of CAD status post stents in the past with multiple risk factors for coronary artery disease including diabetes, hypertension, hyperlipidemia, obesity with symptoms of shortness of breath with minimal exertion  This could represent angina equivalent in a patient with history of diabetes  Patient already had a pharmacological stress test a few months ago  Given ongoing symptoms, options of cardiac catheterization discussed at length with patient and family      Risks and benefits of cardiac catheterization and possible revascularization options including but not limited to risk of infection, bleeding, risk of myocardial infarction, stroke, and risk of death discussed at length with patient  Patient understands the risks and benefits and wishes to proceed  Cardiac catheterization will be scheduled   Preprocedure workup will be done  Further cardiac evaluation and management after the results of cardiac catheterization  Patient will be higher than usual risk based on advanced age, history of stroke as well as chronic kidney disease  Patient has been instructed to hold glyburide as well as lisinopril on the day of cardiac catheterization  Patient will need hydration per JEANNINE protocol as well as LVEDP guided follow-up hydration  Follow-up in 3 weeks or earlier as needed  Time 25 minutes  50% of the time was spent in counseling and coordination of care

## 2019-11-20 NOTE — TELEPHONE ENCOUNTER
S/w daughter, labs are in [address], Hydration orders completed and faxed with the bed res to the hospital for scheduling

## 2019-11-20 NOTE — TELEPHONE ENCOUNTER
Called [employer] and they informed me that this RX does NOT need a PA, not sure where the lines got crossed  They also mentioned that a claim was paid on the 14th, meaning the rx was filled and paid for by ins company

## 2019-11-22 ENCOUNTER — TELEPHONE (OUTPATIENT)
Dept: INTERVENTIONAL RADIOLOGY/VASCULAR | Facility: HOSPITAL | Age: 84
End: 2019-11-22

## 2019-11-22 RX ORDER — SODIUM CHLORIDE 9 MG/ML
125 INJECTION, SOLUTION INTRAVENOUS CONTINUOUS
Status: CANCELLED | OUTPATIENT
Start: 2019-11-22

## 2019-11-25 ENCOUNTER — TELEPHONE (OUTPATIENT)
Dept: SURGERY | Facility: HOSPITAL | Age: 84
End: 2019-11-25

## 2019-11-26 ENCOUNTER — HOSPITAL ENCOUNTER (OUTPATIENT)
Dept: INTERVENTIONAL RADIOLOGY/VASCULAR | Facility: HOSPITAL | Age: 84
Discharge: HOME | End: 2019-11-26
Attending: INTERNAL MEDICINE
Payer: MEDICARE

## 2019-11-26 VITALS
HEIGHT: 70 IN | WEIGHT: 222.44 LBS | HEART RATE: 50 BPM | TEMPERATURE: 97.6 F | DIASTOLIC BLOOD PRESSURE: 74 MMHG | BODY MASS INDEX: 31.85 KG/M2 | RESPIRATION RATE: 18 BRPM | SYSTOLIC BLOOD PRESSURE: 158 MMHG | OXYGEN SATURATION: 98 %

## 2019-11-26 DIAGNOSIS — I10 ESSENTIAL HYPERTENSION: ICD-10-CM

## 2019-11-26 DIAGNOSIS — I25.10 CORONARY ARTERY DISEASE INVOLVING NATIVE CORONARY ARTERY OF NATIVE HEART WITHOUT ANGINA PECTORIS: ICD-10-CM

## 2019-11-26 DIAGNOSIS — R06.02 SHORTNESS OF BREATH: ICD-10-CM

## 2019-11-26 LAB
ANION GAP SERPL CALCULATED.3IONS-SCNC: 8 MMOL/L (ref 4–13)
BUN SERPL-MCNC: 35 MG/DL (ref 5–25)
CALCIUM SERPL-MCNC: 9.1 MG/DL (ref 8.3–10.1)
CHLORIDE SERPL-SCNC: 104 MMOL/L (ref 100–108)
CO2 SERPL-SCNC: 26 MMOL/L (ref 21–32)
CREAT SERPL-MCNC: 1.55 MG/DL (ref 0.6–1.3)
GFR SERPL CREATININE-BSD FRML MDRD: 41 ML/MIN/1.73SQ M
GLUCOSE P FAST SERPL-MCNC: 155 MG/DL (ref 65–99)
GLUCOSE SERPL-MCNC: 155 MG/DL (ref 65–140)
POTASSIUM SERPL-SCNC: 4.5 MMOL/L (ref 3.5–5.3)
SODIUM SERPL-SCNC: 138 MMOL/L (ref 136–145)

## 2019-11-26 PROCEDURE — 99152 MOD SED SAME PHYS/QHP 5/>YRS: CPT | Performed by: INTERNAL MEDICINE

## 2019-11-26 PROCEDURE — C1769 GUIDE WIRE: HCPCS | Performed by: INTERNAL MEDICINE

## 2019-11-26 PROCEDURE — C1894 INTRO/SHEATH, NON-LASER: HCPCS | Performed by: INTERNAL MEDICINE

## 2019-11-26 PROCEDURE — 93458 L HRT ARTERY/VENTRICLE ANGIO: CPT | Performed by: INTERNAL MEDICINE

## 2019-11-26 PROCEDURE — 99153 MOD SED SAME PHYS/QHP EA: CPT | Performed by: INTERNAL MEDICINE

## 2019-11-26 PROCEDURE — 80048 BASIC METABOLIC PNL TOTAL CA: CPT | Performed by: INTERNAL MEDICINE

## 2019-11-26 RX ORDER — METOPROLOL TARTRATE 50 MG/1
75 TABLET, FILM COATED ORAL EVERY 12 HOURS SCHEDULED
Qty: 120 TABLET | Refills: 3 | Status: SHIPPED | OUTPATIENT
Start: 2019-11-26 | End: 2019-12-10

## 2019-11-26 RX ORDER — SODIUM CHLORIDE 9 MG/ML
125 INJECTION, SOLUTION INTRAVENOUS CONTINUOUS
Status: DISCONTINUED | OUTPATIENT
Start: 2019-11-26 | End: 2019-11-30 | Stop reason: HOSPADM

## 2019-11-26 RX ORDER — ACETAMINOPHEN 325 MG/1
650 TABLET ORAL EVERY 8 HOURS PRN
Status: DISCONTINUED | OUTPATIENT
Start: 2019-11-26 | End: 2019-11-30 | Stop reason: HOSPADM

## 2019-11-26 RX ORDER — HEPARIN SODIUM 1000 [USP'U]/ML
INJECTION, SOLUTION INTRAVENOUS; SUBCUTANEOUS CODE/TRAUMA/SEDATION MEDICATION
Status: COMPLETED | OUTPATIENT
Start: 2019-11-26 | End: 2019-11-26

## 2019-11-26 RX ORDER — MIDAZOLAM HYDROCHLORIDE 2 MG/2ML
INJECTION, SOLUTION INTRAMUSCULAR; INTRAVENOUS CODE/TRAUMA/SEDATION MEDICATION
Status: COMPLETED | OUTPATIENT
Start: 2019-11-26 | End: 2019-11-26

## 2019-11-26 RX ORDER — FENTANYL CITRATE 50 UG/ML
INJECTION, SOLUTION INTRAMUSCULAR; INTRAVENOUS CODE/TRAUMA/SEDATION MEDICATION
Status: COMPLETED | OUTPATIENT
Start: 2019-11-26 | End: 2019-11-26

## 2019-11-26 RX ORDER — NITROGLYCERIN 20 MG/100ML
INJECTION INTRAVENOUS CODE/TRAUMA/SEDATION MEDICATION
Status: COMPLETED | OUTPATIENT
Start: 2019-11-26 | End: 2019-11-26

## 2019-11-26 RX ORDER — ATROPINE SULFATE 0.1 MG/ML
INJECTION, SOLUTION ENDOTRACHEAL; INTRAMUSCULAR; INTRAVENOUS; SUBCUTANEOUS CODE/TRAUMA/SEDATION MEDICATION
Status: COMPLETED | OUTPATIENT
Start: 2019-11-26 | End: 2019-11-26

## 2019-11-26 RX ORDER — SODIUM CHLORIDE 9 MG/ML
125 INJECTION, SOLUTION INTRAVENOUS CONTINUOUS
Status: DISPENSED | OUTPATIENT
Start: 2019-11-26 | End: 2019-11-26

## 2019-11-26 RX ORDER — VERAPAMIL HCL 2.5 MG/ML
AMPUL (ML) INTRAVENOUS CODE/TRAUMA/SEDATION MEDICATION
Status: COMPLETED | OUTPATIENT
Start: 2019-11-26 | End: 2019-11-26

## 2019-11-26 RX ORDER — LIDOCAINE WITH 8.4% SOD BICARB 0.9%(10ML)
SYRINGE (ML) INJECTION CODE/TRAUMA/SEDATION MEDICATION
Status: COMPLETED | OUTPATIENT
Start: 2019-11-26 | End: 2019-11-26

## 2019-11-26 RX ORDER — ISOSORBIDE DINITRATE 10 MG/1
10 TABLET ORAL 2 TIMES DAILY
Qty: 60 TABLET | Refills: 3 | Status: SHIPPED | OUTPATIENT
Start: 2019-11-26 | End: 2019-12-20 | Stop reason: SDUPTHER

## 2019-11-26 RX ADMIN — IODIXANOL 40 ML: 320 INJECTION, SOLUTION INTRAVASCULAR at 09:13

## 2019-11-26 RX ADMIN — SODIUM CHLORIDE 125 ML/HR: 0.9 INJECTION, SOLUTION INTRAVENOUS at 07:24

## 2019-11-26 RX ADMIN — Medication 2 ML: at 08:44

## 2019-11-26 RX ADMIN — HEPARIN SODIUM 5000 UNITS: 1000 INJECTION INTRAVENOUS; SUBCUTANEOUS at 08:56

## 2019-11-26 RX ADMIN — NITROGLYCERIN 200 MCG: 20 INJECTION INTRAVENOUS at 08:47

## 2019-11-26 RX ADMIN — MIDAZOLAM HYDROCHLORIDE 1 MG: 1 INJECTION, SOLUTION INTRAMUSCULAR; INTRAVENOUS at 08:41

## 2019-11-26 RX ADMIN — FENTANYL CITRATE 25 MCG: 50 INJECTION, SOLUTION INTRAMUSCULAR; INTRAVENOUS at 08:41

## 2019-11-26 RX ADMIN — ATROPINE SULFATE 0.5 MG: 0.1 INJECTION, SOLUTION ENDOTRACHEAL; INTRAMUSCULAR; INTRAVENOUS; SUBCUTANEOUS at 08:41

## 2019-11-26 NOTE — DISCHARGE INSTRUCTIONS
After Radial Heart Catheterization   WHAT YOU NEED TO KNOW:   What will happen after a radial heart catheterization? · You will be attached to a heart monitor until you are fully awake  A heart monitor is an EKG that stays on continuously to record your heart's electrical activity  Healthcare providers will monitor your vital signs and pulses in your arm  They will frequently check your pressure bandage for bleeding or swelling  · You may have a band wrapped tightly around your wrist  The band puts pressure on your wound and helps prevent bleeding  A healthcare provider can put air into the band or remove air from the band  A healthcare provider will gradually remove air from the band and decrease pressure on your wrist  The band may be removed in 2 hours or when your wound stops bleeding  · You will need to keep your wrist straight for 2 to 4 hours  Do not  push or pull with your arm  Arm movements can cause serious bleeding  After you are monitored for several hours, you may go home or may need to stay in the hospital overnight  What do I need to know before I go home? · Care for your wound as directed  Remove the pressure bandage in 24 hours or as directed  Mild bruising is normal and expected  A small bandage can be placed on your wound after you remove the pressure bandage  Do not put powders, lotions, or creams on your wound  They may cause your wound to get infected  Monitor your wound every day for signs of infection, such as redness, swelling, or pus  · Shower the day after your procedure or as directed  Remove your pressure bandage before you shower  Do not take baths or go in hot tubs or pools  Carefully wash the wound with soap and water  Pat the area dry  A small bandage can be placed on your wound after you shower  · Apply firm, steady pressure to your wound if it bleeds  Apply pressure with a clean gauze or towel for 5 to 10 minutes   Call 911 if bleeding becomes heavy or does not stop  · Drink liquids as directed  Liquids will help flush the contrast liquid from your body  Ask how much liquid to drink each day and which liquids are best for you  · Do not lift anything heavier than 5 pounds until directed by your healthcare provider  Heavy lifting can put stress on your wound and cause bleeding  Do not push or pull with the arm that was used for the procedure  Do not do vigorous activity for at least 48 hours  Vigorous activity may cause bleeding from your wound  Rest and do quiet activities  Take short walks around the house to prevent a blood clot  Ask your healthcare provider when you can return to your normal activities  · Do not drive or return to work until your healthcare provider says it is okay  Your healthcare provider may tell you to wait 48 hours before you drive to decrease your risk for bleeding  You may not be able to return to work for at least 2 days after your procedure if your job involves heavy lifting  What medicines may I need? You may need any of the following:  · Blood thinners    help prevent blood clots  Examples of blood thinners include heparin and warfarin  Clots can cause strokes, heart attacks, and death  The following are general safety guidelines to follow while you are taking a blood thinner:    ¨ Watch for bleeding and bruising while you take blood thinners  Watch for bleeding from your gums or nose  Watch for blood in your urine and bowel movements  Use a soft washcloth on your skin, and a soft toothbrush to brush your teeth  This can keep your skin and gums from bleeding  If you shave, use an electric shaver  Do not play contact sports  ¨ Tell your dentist and other healthcare providers that you take anticoagulants  Wear a bracelet or necklace that says you take this medicine  ¨ Do not start or stop any medicines unless your healthcare provider tells you to  Many medicines cannot be used with blood thinners       ¨ Tell your healthcare provider right away if you forget to take the medicine, or if you take too much  ¨ Warfarin  is a blood thinner that you may need to take  The following are things you should be aware of if you take warfarin  § Foods and medicines can affect the amount of warfarin in your blood  Do not make major changes to your diet while you take warfarin  Warfarin works best when you eat about the same amount of vitamin K every day  Vitamin K is found in green leafy vegetables and certain other foods  Ask for more information about what to eat when you are taking warfarin  § You will need to see your healthcare provider for follow-up visits when you are on warfarin  You will need regular blood tests  These tests are used to decide how much medicine you need  · Acetaminophen  helps decrease pain and fever  This medicine is available without a doctor's order  Ask how much medicine is safe to take, and how often to take it  Acetaminophen can cause liver damage if not taken correctly  · Take your medicine as directed  Contact your healthcare provider if you think your medicine is not helping or if you have side effects  Tell him or her if you are allergic to any medicine  Keep a list of the medicines, vitamins, and herbs you take  Include the amounts, and when and why you take them  Bring the list or the pill bottles to follow-up visits  Carry your medicine list with you in case of an emergency    Call 911 for any of the following:   · You have any of the following signs of a heart attack:      ¨ Squeezing, pressure, or pain in your chest that lasts longer than 5 minutes or returns    ¨ Discomfort or pain in your back, neck, jaw, stomach, or arm     ¨ Trouble breathing    ¨ Nausea or vomiting    ¨ Lightheadedness or a sudden cold sweat, especially with chest pain or trouble breathing    · You have any of the following signs of a stroke:      ¨ Numbness or drooping on one side of your face ¨ Weakness in an arm or leg    ¨ Confusion or difficulty speaking    ¨ Dizziness, a severe headache, or vision loss    · You feel lightheaded, short of breath, and have chest pain  · You cough up blood  · You have trouble breathing  · You cannot stop the bleeding from your wound even after you hold firm pressure for 10 minutes  When should I seek immediate care? · Blood soaks through your bandage  · Your stitches come apart  · Your hand or arm feels numb, cool, or looks pale  · Your wound gets swollen quickly  When should I contact my healthcare provider? · You have a fever or chills  · Your wound is red, swollen, or draining pus  · Your wound looks more bruised or you have new bruising on the side of your wrist      · You have nausea or are vomiting  · Your skin is itchy, swollen, or you have a rash  · You have questions or concerns about your condition or care  CARE AGREEMENT:   You have the right to help plan your care  Learn about your health condition and how it may be treated  Discuss treatment options with your caregivers to decide what care you want to receive  You always have the right to refuse treatment  The above information is an  only  It is not intended as medical advice for individual conditions or treatments  Talk to your doctor, nurse or pharmacist before following any medical regimen to see if it is safe and effective for you  © 2017 [employer] Information is for End User's use only and may not be sold, redistributed or otherwise used for commercial purposes  All illustrations and images included in CareNotes® are the copyrighted property of [employer]  or [employer]

## 2019-11-26 NOTE — INTERVAL H&P NOTE
Update: (This section must be completed if the H&P was completed greater than 24 hrs to procedure or admission)    H&P reviewed  After examining the patient, I find no changed to the H&P since it had been written  Patient continues to have dyspnea on exertion which is something new  Denies bleeding problem or upcoming surgery  Physical examination: aaox3, not in distress, s1s2 normal, aortic stenosis murmur, bilateral air entry positive, no wheeze or crackle, abdomen soft, BS +, no leg edema  Risks, benefits and alternatives of coronary angiogram including possible percutaneous coronary intervention explained to the patient  He understands and agreed  He had CKD and aware of post cath renal function worsening/seen by nephrology and would like to proceed with cardiac cath    /73   Pulse (!) 45   Temp (!) 97 3 °F (36 3 °C) (Temporal)   Resp 21   Ht 5' 10" (1 778 m)   Wt 101 kg (222 lb 7 1 oz)   SpO2 97%   BMI 31 92 kg/m²     Sonny Tarango MD/November 26, 2019/7:43 AM

## 2019-12-02 ENCOUNTER — OFFICE VISIT (OUTPATIENT)
Dept: CARDIAC SURGERY | Facility: CLINIC | Age: 84
End: 2019-12-02
Payer: MEDICARE

## 2019-12-02 VITALS
DIASTOLIC BLOOD PRESSURE: 54 MMHG | HEIGHT: 69 IN | HEART RATE: 54 BPM | RESPIRATION RATE: 19 BRPM | BODY MASS INDEX: 33.07 KG/M2 | SYSTOLIC BLOOD PRESSURE: 120 MMHG | TEMPERATURE: 97.8 F | WEIGHT: 223.3 LBS

## 2019-12-02 DIAGNOSIS — I25.118 CORONARY ARTERY DISEASE INVOLVING NATIVE CORONARY ARTERY OF NATIVE HEART WITH OTHER FORM OF ANGINA PECTORIS (HCC): Primary | ICD-10-CM

## 2019-12-02 DIAGNOSIS — Z86.73 HISTORY OF CVA (CEREBROVASCULAR ACCIDENT): ICD-10-CM

## 2019-12-02 DIAGNOSIS — Z01.818 PRE-OP TESTING: ICD-10-CM

## 2019-12-02 PROCEDURE — 99204 OFFICE O/P NEW MOD 45 MIN: CPT | Performed by: NURSE PRACTITIONER

## 2019-12-02 NOTE — PROGRESS NOTES
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),   L UE DVT (11/2017) and chronic back pain  Patient has developed progressive exertional dyspnea recently, now occurring with minimal activity  He also reports lightheadedness  He denies chest pain, palpitations, syncope, weight gain or edema  He lives in a mobile home with his significantly other  He reports he is independent with his ADL's and works repairing lawn mowers  He still drives  He denies any residual weakness or numbness from his previous CVA  He is followed by Neurology  He is a lifelong non-smoker  He rarely drinks alcohol        Past Medical History:  Past Medical History:   Diagnosis Date    Acute ST elevation [facility] infarction [facility])     last assessed: 04/15/2016    Aortic valve disorder     Benign prostatic hyperplasia with lower urinary tract symptoms     CAD (coronary artery disease)     [facility]     History of colonoscopy     resolved: 05/08/2012    History of transfusion     History of transient cerebral ischemia     HLD (hyperlipidemia)     HTN (hypertension)     Neuralgia          Past Surgical History:   Past Surgical History:   Procedure Laterality Date    CARDIAC CATHETERIZATION      Outcome: successful; last assessed: 02/03/2015    CORONARY ANGIOPLASTY WITH STENT PLACEMENT  2008    stent to LAD     HAND SURGERY      thumb    HIP HARDWARE REMOVAL      TOTAL HIP ARTHROPLASTY Right     TOTAL HIP ARTHROPLASTY Bilateral          Family History:  Family History   Problem Relation Age of Onset    Emphysema Father     Emphysema Sister          Social History:    Social History     Substance and Sexual Activity   Alcohol Use No     Social History     Substance and Sexual Activity   Drug Use No     Social History     Tobacco Use   Smoking Status Never Smoker   Smokeless Tobacco Never Used       Home Medications:   Prior to Admission medications    Medication Sig Start Date End Date Taking?  Authorizing Provider   amLODIPine (NORVASC) 10 mg tablet TAKE 1 TABLET BY MOUTH  DAILY 7/22/19  Yes Doyle Hurtado MD   aspirin (ECOTRIN LOW STRENGTH) 81 mg EC tablet 1 tab daily 4/1/19  Yes Sanjay Palacio MD   Blood Glucose Monitoring Suppl (ONE TOUCH ULTRA MINI) w/Device KIT by Does not apply route   Yes Historical Provider, MD   cholecalciferol (VITAMIN D3) 1,000 units tablet Take 1,000 Units by mouth daily   Yes Historical Provider, MD   clopidogrel (PLAVIX) 75 mg tablet TAKE 1 TABLET BY MOUTH  DAILY 9/10/19  Yes Ami Daniels DO   Cyanocobalamin ER (RA VITAMIN B-12) 1000 MCG TBCR Take 2 tablets by mouth daily at bedtime   1/8/18  Yes Historical Provider, MD   doxazosin (CARDURA) 8 MG tablet Take 1 tablet (8 mg total) by mouth daily 10/8/19 1/6/20 Yes Celia Ortega MD   gabapentin (NEURONTIN) 100 mg capsule Take 1 capsule (100 mg total) by mouth 2 (two) times a day for 90 days 5/3/19 12/2/19 Yes Ami Daniels DO   glucose blood (ONE TOUCH ULTRA TEST) test strip Test once daily 7/8/19  Yes Ami Daniels DO   glyBURIDE (DIABETA) 5 mg tablet Take 1 tablet (5 mg total) by mouth daily 10/25/19 1/23/20 Yes Sienna Santiago MD   isosorbide dinitrate (ISORDIL) 10 mg tablet Take 1 tablet (10 mg total) by mouth 2 (two) times a day 11/26/19  Yes Marj Jessica MD   lisinopril (ZESTRIL) 40 mg tablet Take 1 tablet (40 mg total) by mouth daily 10/25/19  Yes Ami Daniels DO   metoprolol tartrate (LOPRESSOR) 25 mg tablet Take 1 tablet (25 mg total) by mouth every 12 (twelve) hours 11/26/19  Yes Joel Lema MD   Omega-3 Fatty Acids (FISH OIL PO) Take 1 capsule by mouth Daily   Yes Historical Provider, MD   pyridoxine (RA VITAMIN B-6) 100 MG tablet Take 1 tablet by mouth daily 1/8/18  Yes Historical Provider, MD   Ergocalciferol (VITAMIN D2) 10 MCG (400 UNIT) TABS Vitamin D2 1,250 mcg (50,000 unit) capsule   TAKE 1 CAPSULE BY MOUTH WEEKLY    Historical Provider, MD   metoprolol tartrate (LOPRESSOR) 50 mg tablet Take 1 5 tablets (75 mg total) by mouth every 12 (twelve) hours  Patient not taking: Reported on 12/2/2019 11/26/19   Joel Lema MD       Allergies:   Allergies   Allergen Reactions    Celecoxib     Hydromorphone     Pravastatin Hives    Statins        Review of Systems:   Review of Systems - History obtained from chart review and the patient  General ROS: negative  Psychological ROS: negative  Ophthalmic ROS: uses reading glasses otherwise no visual disturbances,   ENT ROS: negative  Hematological and Lymphatic ROS: negative for - bleeding problems, blood clots, bruising or jaundice  Respiratory ROS: no cough, shortness of breath, or wheezing  Cardiovascular ROS: positive for - dyspnea on exertion and exertional lightheadedness  negative for - chest pain, edema, irregular heartbeat, loss of consciousness, murmur, orthopnea, palpitations, paroxysmal nocturnal dyspnea or rapid heart rate  Gastrointestinal ROS: no abdominal pain, change in bowel habits, or black or bloody stools  Genito-Urinary ROS: no dysuria, trouble voiding, or hematuria  Musculoskeletal ROS: chronic back pain  Neurological ROS: no TIA or stroke symptoms  Dermatological ROS: negative    Vital Signs:     Vitals:    12/02/19 1400   BP: 120/54   BP Location: Left arm   Patient Position: Sitting   Cuff Size: Large   Pulse: (!) 54   Resp: 19   Temp: 97 8 °F (36 6 °C)   TempSrc: Tympanic   Weight: 101 kg (223 lb 4 8 oz)   Height: 5' 9" (1 753 m)       Physical Exam:    General: Alert, oriented, well developed, no acute distress  HEENT/NECK:  PERRLA  No jugular venous distention  Cardiac:Regular rate and rhythm, No murmurs rubs or gallops  Carotid arteries: 2+ pulses, no bruits  Pulmonary:  Breath sounds clear bilaterally  Abdomen:  Non-tender, Non-distended  Positive bowel sounds  Upper extremities: 2+ radial pulses; brisk capillary refill; right hand dominance  Lower extremities: Extremities warm/dry  PT/DP pulses 2+ bilaterally  No edema B/L  Neuro: Alert and oriented X 3  Sensation is grossly intact  No focal deficits  Musculoskeletal: MAEE, stable gait  Skin: Warm/Dry, without rashes or lesions  Lab Results:   Lab Results   Component Value Date    WBC 6 39 11/19/2019    HGB 13 1 11/19/2019    HCT 40 8 11/19/2019    MCV 96 11/19/2019     11/19/2019     Results from last 7 days   Lab Units 11/26/19  0725   POTASSIUM mmol/L 4 5   CHLORIDE mmol/L 104   CO2 mmol/L 26   BUN mg/dL 35*   CREATININE mg/dL 1 55*   CALCIUM mg/dL 9 1     Lab Results   Component Value Date    INR 1 06 11/19/2019    INR 1 97 (H) 03/28/2018    INR 1 57 (H) 03/13/2018    PROTIME 13 9 11/19/2019    PROTIME 22 6 (H) 03/28/2018    PROTIME 19 1 (H) 03/13/2018         Lab Results   Component Value Date    HGBA1C 6 4 (H) 09/09/2019     Lab Results   Component Value Date    TROPONINI 0 04 11/26/2017       Imaging Studies:     Cardiac Catheterization:   Triple vessel coroanry artery disease with left dominant system  Sequential instent restenosis lesion maximum of 90% in mid LAD  Proximal OM1 80% stenosis  proximal LPDA 75-80% stenosis    Echocardiogram:   LEFT VENTRICLE:  Ejection fraction was estimated to be 60 %  There were no regional wall motion abnormalities    Concentric hypertrophy was present      LEFT ATRIUM:  The atrium was mildly to moderately dilated      MITRAL VALVE:  There was mild regurgitation      AORTIC VALVE:  There was mild stenosis      TRICUSPID VALVE:  There was mild regurgitation  I have personally reviewed pertinent films in PACS    Assessment:  Patient Active Problem List    Diagnosis Date Noted    Hypertensive kidney disease with stage 3 [facility] 10/08/2019    Other proteinuria 10/08/2019    Poorly-controlled hypertension 09/10/2019    Type 2 diabetes mellitus with kidney complication, without long-term current use of insulin ([facility] ) 06/27/2019    Type 2 diabetes mellitus without complication ([facility] ) 57/77/6776    Hemiplegia and 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-dominant side ([facility] ) 04/10/2019    Atrial fibrillation ([facility] ) 03/04/2019    History of CVA (cerebrovascular accident) 02/21/2019    Stage 3 chronic kidney disease ([facility] ) 12/04/2018    Encounter for monitoring antiplatelet therapy 95/32/2064    Nonrheumatic aortic valve stenosis 10/09/2018    At risk for polypharmacy 01/26/2018    Stroke ([facility] ) 11/24/2017    HTN (hypertension) 11/24/2017    DM2 (diabetes mellitus, type 2) ([facility] ) 11/24/2017    HLD (hyperlipidemia) 11/24/2017    CAD (coronary artery disease) 11/24/2017    Right hip pain 11/24/2017    Acute deep vein thrombosis (DVT) of brachial vein of left upper extremity ([facility] ) 11/24/2017     Severe coronary artery disease; Ongoing CABG workup    Plan:  Risks and benefits of coronary artery bypass grafting were discussed in detail today with the patient and his daughter  They understand and wish to proceed with further workup and ultimately surgical intervention  We have ordered routine preoperative laboratory and vascular studies  He is scheduled to see neurology next week  We will ask them to advise as to what study is appropriate to assess carotid and cerebral circulation considering his prior CVA history and CKD3 history  (CTA head / neck vs MRA)  After completed he will return to our office to review test results and to determine treatment plan       Tania Schilder was comfortable with our recommendations, and their questions were answered to their satisfaction  Thank you for allowing us to participate in the care of this patient       Routine referral to gastroenterology for colonoscopy screening was not indicated, as the patient is over 78years old    SIGNATURE: JALEEL Sam  DATE: December 2, 2019  TIME: 2:10 PM

## 2019-12-03 DIAGNOSIS — I66.01 OCCLUSION AND STENOSIS OF RIGHT MIDDLE CEREBRAL ARTERY: ICD-10-CM

## 2019-12-03 DIAGNOSIS — Z01.818 PREOP TESTING: ICD-10-CM

## 2019-12-03 DIAGNOSIS — I67.89 OTHER CEREBROVASCULAR DISEASE: ICD-10-CM

## 2019-12-03 DIAGNOSIS — Z86.73 HISTORY OF CVA (CEREBROVASCULAR ACCIDENT): ICD-10-CM

## 2019-12-03 DIAGNOSIS — I63.9 CEREBROVASCULAR ACCIDENT (CVA), UNSPECIFIED MECHANISM (HCC): Primary | ICD-10-CM

## 2019-12-09 ENCOUNTER — HOSPITAL ENCOUNTER (OUTPATIENT)
Dept: RADIOLOGY | Facility: HOSPITAL | Age: 84
Discharge: HOME | End: 2019-12-09
Payer: MEDICARE

## 2019-12-09 DIAGNOSIS — Z86.73 HISTORY OF CVA (CEREBROVASCULAR ACCIDENT): ICD-10-CM

## 2019-12-09 DIAGNOSIS — I25.118 CORONARY ARTERY DISEASE INVOLVING NATIVE CORONARY ARTERY OF NATIVE HEART WITH OTHER FORM OF ANGINA PECTORIS (HCC): ICD-10-CM

## 2019-12-09 PROCEDURE — 71250 CT THORAX DX C-: CPT

## 2019-12-09 PROCEDURE — 93971 EXTREMITY STUDY: CPT

## 2019-12-10 ENCOUNTER — TELEPHONE (OUTPATIENT)
Dept: CARDIOLOGY CLINIC | Facility: CLINIC | Age: 84
End: 2019-12-10

## 2019-12-10 ENCOUNTER — OFFICE VISIT (OUTPATIENT)
Dept: CARDIOLOGY CLINIC | Facility: CLINIC | Age: 84
End: 2019-12-10
Payer: MEDICARE

## 2019-12-10 VITALS
SYSTOLIC BLOOD PRESSURE: 144 MMHG | HEART RATE: 51 BPM | WEIGHT: 222 LBS | OXYGEN SATURATION: 95 % | DIASTOLIC BLOOD PRESSURE: 68 MMHG | HEIGHT: 69 IN | BODY MASS INDEX: 32.88 KG/M2

## 2019-12-10 DIAGNOSIS — I25.10 CORONARY ARTERY DISEASE INVOLVING NATIVE CORONARY ARTERY OF NATIVE HEART WITHOUT ANGINA PECTORIS: Primary | ICD-10-CM

## 2019-12-10 DIAGNOSIS — I10 ESSENTIAL HYPERTENSION: ICD-10-CM

## 2019-12-10 DIAGNOSIS — Z79.02 ENCOUNTER FOR MONITORING ANTIPLATELET THERAPY: ICD-10-CM

## 2019-12-10 DIAGNOSIS — Z51.81 ENCOUNTER FOR MONITORING ANTIPLATELET THERAPY: ICD-10-CM

## 2019-12-10 PROCEDURE — 99214 OFFICE O/P EST MOD 30 MIN: CPT | Performed by: INTERNAL MEDICINE

## 2019-12-10 PROCEDURE — 93971 EXTREMITY STUDY: CPT | Performed by: SURGERY

## 2019-12-10 NOTE — PROGRESS NOTES
[facility] [place]  [address]  [facility] [address] [id number]  Cardiology Follow Up    Roro Reardon  1935  [id number]      1  Coronary artery disease involving native coronary artery of native heart without angina pectoris     2  Essential hypertension     3  Encounter for monitoring antiplatelet therapy         Chief Complaint   Patient presents with    Follow-up            Interval History:  Patient presents for follow-up visit  Patient does have history of a coronary artery disease diabetes, hypertension, hyperlipidemia  Patient also has history of CVA  Patient had cardiac catheterization which showed significant coronary artery disease  Patient is being considered for PCI versus CABG  Patient was seen by cardiac surgery  Patient is undergoing workup and has a follow-up appointment on 16th of this month  Patient does have memory difficulties in the recent past according to daughter  Patient denies any history of chest pain presently  Patient does have shortness of breath with exertion  No history of leg edema orthopnea PND  He states that he has been compliant with all his present medications      Patient Active Problem List   Diagnosis    [facility] )    HTN (hypertension)    DM2 (diabetes mellitus, type 2) ([facility] )    HLD (hyperlipidemia)    CAD (coronary artery disease)    Right hip pain    Acute deep vein thrombosis (DVT) of brachial vein of left upper extremity ([facility] )    At risk for polypharmacy    Encounter for monitoring antiplatelet therapy    Nonrheumatic aortic valve stenosis    Stage 3 chronic kidney disease ([facility] )    History of CVA (cerebrovascular accident)    Atrial fibrillation (HCC)    Hemiplegia and 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-dominant side ([facility] )    Type 2 diabetes mellitus without complication ([facility] )    Type 2 diabetes mellitus with kidney complication, without long-term current use of insulin ([facility] )    Poorly-controlled hypertension    Hypertensive kidney disease with stage 3 chronic kidney disease (HCC)    Other proteinuria     Past Medical History:   Diagnosis Date    Acute ST elevation myocardial infarction [facility])     last assessed: 04/15/2016    Aortic valve disorder     Benign prostatic hyperplasia with lower urinary tract symptoms     CAD (coronary artery disease)     DM2 (diabetes mellitus, type 2) ([facility] )     History of colonoscopy     resolved: 05/08/2012    History of transfusion     History of transient cerebral ischemia     HLD (hyperlipidemia)     HTN (hypertension)     Neuralgia      Social History     Socioeconomic History    Marital status:       Spouse name: Not on file    Number of children: Not on file    Years of education: Not on file    Highest education level: Not on file   Occupational History    Not on file   Social Needs    Financial resource strain: Not on file    Food insecurity:     Worry: Not on file     Inability: Not on file    Transportation needs:     Medical: Not on file     Non-medical: Not on file   Tobacco Use    Smoking status: Never Smoker    Smokeless tobacco: Never Used   Substance and Sexual Activity    Alcohol use: No    Drug use: No    Sexual activity: Not Currently     Partners: Female     Birth control/protection: None   Lifestyle    Physical activity:     Days per week: 0 days     Minutes per session: 0 min    Stress: Not at all   Relationships    Social connections:     Talks on phone: Not on file     Gets together: Not on file     Attends Faith service: Not on file     Active member of club or organization: Not on file     Attends meetings of clubs or organizations: Not on file     Relationship status: Not on file    Intimate partner violence:     Fear of current or ex partner: Not on file     Emotionally abused: Not on file     Physically abused: Not on file     Forced sexual activity: Not on file   Other Topics Concern    Not on file   Social History Narrative    Active advance directive (as per [employer])     No advance directive on file (as per [employer])       Family History   Problem Relation Age of Onset    Emphysema Father     Emphysema Sister      Past Surgical History:   Procedure Laterality Date    CARDIAC CATHETERIZATION      Outcome: successful; last assessed: 02/03/2015    CORONARY ANGIOPLASTY WITH STENT PLACEMENT  2008    stent to LAD     HAND SURGERY      thumb    HIP HARDWARE REMOVAL      TOTAL HIP ARTHROPLASTY Right     TOTAL HIP ARTHROPLASTY Bilateral        Current Outpatient Medications:     amLODIPine (NORVASC) 10 mg tablet, TAKE 1 TABLET BY MOUTH  DAILY, Disp: 90 tablet, Rfl: 1    aspirin (ECOTRIN LOW STRENGTH) 81 mg EC tablet, 1 tab daily, Disp: 30 tablet, Rfl: 4    Blood Glucose Monitoring Suppl (ONE TOUCH ULTRA MINI) w/Device KIT, by Does not apply route, Disp: , Rfl:     cholecalciferol (VITAMIN D3) 1,000 units tablet, Take 1,000 Units by mouth daily, Disp: , Rfl:     clopidogrel (PLAVIX) 75 mg tablet, TAKE 1 TABLET BY MOUTH  DAILY, Disp: 90 tablet, Rfl: 1    Cyanocobalamin ER (RA VITAMIN B-12) 1000 MCG TBCR, Take 2 tablets by mouth daily at bedtime  , Disp: , Rfl:     doxazosin (CARDURA) 8 MG tablet, Take 1 tablet (8 mg total) by mouth daily, Disp: 90 tablet, Rfl: 2    Ergocalciferol (VITAMIN D2) 10 MCG (400 UNIT) TABS, 50,000 Units , Disp: , Rfl:     gabapentin (NEURONTIN) 100 mg capsule, Take 1 capsule (100 mg total) by mouth 2 (two) times a day for 90 days, Disp: 180 capsule, Rfl: 3    glucose blood (ONE TOUCH ULTRA TEST) test strip, Test once daily, Disp: 100 each, Rfl: 5    isosorbide dinitrate (ISORDIL) 10 mg tablet, Take 1 tablet (10 mg total) by mouth 2 (two) times a day, Disp: 60 tablet, Rfl: 3    lisinopril (ZESTRIL) 40 mg tablet, Take 1 tablet (40 mg total) by mouth daily, Disp: 90 tablet, Rfl: 3    metoprolol tartrate (LOPRESSOR) 25 mg tablet, Take 1 tablet (25 mg total) by mouth every 12 (twelve) hours, Disp: 60 tablet, Rfl: 3    Omega-3 Fatty Acids (FISH OIL PO), Take 1 capsule by mouth Daily, Disp: , Rfl:     pyridoxine (RA VITAMIN B-6) 100 MG tablet, Take 1 tablet by mouth daily, Disp: , Rfl:   Allergies   Allergen Reactions    Celecoxib     Hydromorphone     Pravastatin Hives    Statins        Labs:  Hospital Outpatient Visit on 11/26/2019   Component Date Value    Sodium 11/26/2019 138     Potassium 11/26/2019 4 5     Chloride 11/26/2019 104     CO2 11/26/2019 26     ANION GAP 11/26/2019 8     BUN 11/26/2019 35*    Creatinine 11/26/2019 1 55*    Glucose 11/26/2019 155*    Glucose, Fasting 11/26/2019 155*    Calcium 11/26/2019 9 1     eGFR 11/26/2019 41    Office Visit on 11/19/2019   Component Date Value    WBC 11/19/2019 6 39     RBC 11/19/2019 4 25     Hemoglobin 11/19/2019 13 1     Hematocrit 11/19/2019 40 8     MCV 11/19/2019 96     MCH 11/19/2019 30 8     MCHC 11/19/2019 32 1     RDW 11/19/2019 12 9     MPV 11/19/2019 10 1     Platelets 99/20/4476 177     nRBC 11/19/2019 0     Neutrophils Relative 11/19/2019 47     Immat GRANS % 11/19/2019 0     Lymphocytes Relative 11/19/2019 37     Monocytes Relative 11/19/2019 9     Eosinophils Relative 11/19/2019 6     Basophils Relative 11/19/2019 1     Neutrophils Absolute 11/19/2019 3 09     Immature Grans Absolute 11/19/2019 0 01     Lymphocytes Absolute 11/19/2019 2 35     Monocytes Absolute 11/19/2019 0 55     Eosinophils Absolute 11/19/2019 0 35     Basophils Absolute 11/19/2019 0 04     Sodium 11/19/2019 139     Potassium 11/19/2019 5 1     Chloride 11/19/2019 103     CO2 11/19/2019 31     ANION GAP 11/19/2019 5     BUN 11/19/2019 31*    Creatinine 11/19/2019 1 87*    Glucose 11/19/2019 104     Calcium 11/19/2019 9 6     AST 11/19/2019 17     ALT 11/19/2019 21     Alkaline Phosphatase 11/19/2019 59     Total Protein 11/19/2019 7 6     Albumin 11/19/2019 3 7     Total Bilirubin 11/19/2019 0 30     eGFR 11/19/2019 33     Protime 11/19/2019 13 9     INR 11/19/2019 1 06    Appointment on 10/29/2019   Component Date Value    PSA 10/29/2019 5 8*     Imaging: Vas Lower Limb Vein Mapping Bypass Graft    Result Date: 12/10/2019  Narrative:  [facility] REPORT [facility]INICAL: Indications: Pre-op Vein Mapping for Future Open Heart Surgery scheduled for no date yet  Risk Factors The patient has history of HTN and Diabetes (Yes)  FINDINGS:  Right           Impression       Diameter AP  CFV             Normal (Patent)               GSV Inguinal                             3 9  GSV Prox Thigh                           4 6  GSV Mid Thigh                            2 3  GSV Dist Thigh                           3 0  GSV Knee                                 2 1  GSV Prox Calf                            2 0  GSV Mid Calf                             2 1  GSV Dist Calf                            1 8   Left            Impression              Diameter AP  CFV             Normal (Patent)                      GSV Inguinal                                    5 9  GSV Prox Thigh                                  5 9  GSV Mid Thigh                                   3 3  GSV Dist Thigh                                  3 1  GSV Knee                                        3 0  GSV Prox Calf                                   2 7  GSV Mid Calf                                    2 3  GSV Dist Calf                                   2 5  Peroneal        Non Occlusive Thrombus                  CONCLUSION: Impression: RIGHT LOWER LIMB: The great saphenous vein is patent  from the groin to the ankle  The vein is of adequate caliber and quality for graft conduit with intraluminal diameters ranging from 2 1mm to 4 6mm throughout the leg  LEFT LOWER LIMB: Acute non-occlusive deep vein thrombosis in the one peroneal vein  The great saphenous vein is patent  from the groin to the ankle   The vein is of adequate caliber and quality for graft conduit with intraluminal diameters ranging from 2 3mm to 5 9mm throughout the leg  Tech note: Technical findings called to [employer]  17:00  SIGNATURE: Electronically Signed by: Lamar Mccabe on 2019-12-10 08:57:17 AM      Review of Systems:  Review of Systems   REVIEW OF SYSTEMS:  Constitutional:  Denies fever or chills   Eyes:  Denies change in visual acuity   HENT:  Denies nasal congestion or sore throat   Respiratory:   shortness of breath   Cardiovascular:  Denies chest pain or edema   GI:  Denies abdominal pain, nausea, vomiting, bloody stools or diarrhea   :  Denies dysuria, frequency, difficulty in micturition and nocturia  Musculoskeletal:  Denies back pain or joint pain   Neurologic:  History of stroke in the past   Memory difficulties  Endocrine:  Denies polyuria or polydipsia   Lymphatic:  Denies swollen glands   Psychiatric:  Denies depression or anxiety     Physical Exam:    /68   Pulse (!) 51   Ht 5' 9" (1 753 m)   Wt 101 kg (222 lb)   SpO2 95%   BMI 32 78 kg/m²     Physical Exam   PHYSICAL EXAM:  General:  Patient is not in acute distress   Head: Normocephalic, Atraumatic  HEENT:  Both pupils normal-size atraumatic, normocephalic, nonicteric  Neck:  JVP not raised  Trachea central  No carotid bruit  Respiratory:  normal breath sounds no crackles  no rhonchi  Cardiovascular:  Regular rate and rhythm no S3 3/6 systolic ejection murmur in the right sternal border  GI:  Abdomen soft nontender  No organomegaly  Lymphatic:  No cervical or inguinal lymphadenopathy  Neurologic:  Patient is awake alert, oriented   Grossly nonfocal  Extremities no edema      Discussion/Summary:  Patient with multiple medical problems who seems to be doing reasonably well from cardiac standpoint  Previous studies reviewed with patient  Medications reviewed and possible side effects discussed  concepts of cardiovascular disease , signs and symptoms of heart disease   Dietary and risk factor modification reinforced  All questions answered  Safety measures reviewed  Patient advised to report any problems prompting medical attention  Results of cardiac catheterization reviewed with patient and family  Patient is being considered for PCI versus CABG after his workup for possible considerations for CABG as high risk given his advanced age comorbidities as well as chronic kidney disease  Patient and family understand the same  Medications reviewed  Cardiac catheterization films reviewed  Follow-up in 2 to 3 months or earlier as needed  Follow-up with primary care physician

## 2019-12-10 NOTE — TELEPHONE ENCOUNTER
Patient was in the office today to see Dr James De Los Santos ~ he stated that he has been having a hard time getting  glyburide filled  Pharmacy told him that a prior auth was needed and contacted the office, patient still has not heard anything     Please advise, thanks

## 2019-12-13 ENCOUNTER — TELEPHONE (OUTPATIENT)
Dept: NEUROLOGY | Facility: CLINIC | Age: 84
End: 2019-12-13

## 2019-12-13 ENCOUNTER — HOSPITAL ENCOUNTER (OUTPATIENT)
Dept: MRI IMAGING | Facility: CLINIC | Age: 84
Discharge: HOME | End: 2019-12-13
Payer: MEDICARE

## 2019-12-13 ENCOUNTER — OFFICE VISIT (OUTPATIENT)
Dept: NEUROLOGY | Facility: CLINIC | Age: 84
End: 2019-12-13
Payer: MEDICARE

## 2019-12-13 ENCOUNTER — APPOINTMENT (OUTPATIENT)
Dept: LAB | Facility: HOSPITAL | Age: 84
End: 2019-12-13
Attending: PSYCHIATRY & NEUROLOGY
Payer: MEDICARE

## 2019-12-13 VITALS
HEIGHT: 69 IN | HEART RATE: 56 BPM | BODY MASS INDEX: 32.7 KG/M2 | DIASTOLIC BLOOD PRESSURE: 62 MMHG | WEIGHT: 220.8 LBS | SYSTOLIC BLOOD PRESSURE: 118 MMHG

## 2019-12-13 DIAGNOSIS — I63.9 CEREBROVASCULAR ACCIDENT (CVA), UNSPECIFIED MECHANISM (HCC): ICD-10-CM

## 2019-12-13 DIAGNOSIS — N18.30 TYPE 2 DIABETES MELLITUS WITH STAGE 3 CHRONIC KIDNEY DISEASE, WITHOUT LONG-TERM CURRENT USE OF INSULIN (HCC): ICD-10-CM

## 2019-12-13 DIAGNOSIS — Z01.818 PREOP TESTING: ICD-10-CM

## 2019-12-13 DIAGNOSIS — R41.3 MEMORY DIFFICULTY: ICD-10-CM

## 2019-12-13 DIAGNOSIS — Z86.73 HISTORY OF CVA (CEREBROVASCULAR ACCIDENT): Primary | ICD-10-CM

## 2019-12-13 DIAGNOSIS — E11.22 TYPE 2 DIABETES MELLITUS WITH STAGE 3 CHRONIC KIDNEY DISEASE, WITHOUT LONG-TERM CURRENT USE OF INSULIN (HCC): ICD-10-CM

## 2019-12-13 DIAGNOSIS — Z86.73 HISTORY OF CVA (CEREBROVASCULAR ACCIDENT): ICD-10-CM

## 2019-12-13 DIAGNOSIS — I67.89 OTHER CEREBROVASCULAR DISEASE: ICD-10-CM

## 2019-12-13 DIAGNOSIS — I10 ESSENTIAL HYPERTENSION: ICD-10-CM

## 2019-12-13 DIAGNOSIS — I25.118 CORONARY ARTERY DISEASE INVOLVING NATIVE CORONARY ARTERY OF NATIVE HEART WITH OTHER FORM OF ANGINA PECTORIS (HCC): ICD-10-CM

## 2019-12-13 DIAGNOSIS — I66.01 OCCLUSION AND STENOSIS OF RIGHT MIDDLE CEREBRAL ARTERY: ICD-10-CM

## 2019-12-13 LAB
FOLATE SERPL-MCNC: >20 NG/ML (ref 3.1–17.5)
T4 FREE SERPL-MCNC: 1.06 NG/DL (ref 0.76–1.46)
TSH SERPL DL<=0.05 MIU/L-ACNC: 3.87 UIU/ML (ref 0.36–3.74)
VIT B12 SERPL-MCNC: 1993 PG/ML (ref 100–900)

## 2019-12-13 PROCEDURE — 86618 LYME DISEASE ANTIBODY: CPT

## 2019-12-13 PROCEDURE — 86592 SYPHILIS TEST NON-TREP QUAL: CPT

## 2019-12-13 PROCEDURE — 99214 OFFICE O/P EST MOD 30 MIN: CPT | Performed by: PSYCHIATRY & NEUROLOGY

## 2019-12-13 PROCEDURE — 70547 MR ANGIOGRAPHY NECK W/O DYE: CPT

## 2019-12-13 PROCEDURE — 84439 ASSAY OF FREE THYROXINE: CPT

## 2019-12-13 PROCEDURE — 36415 COLL VENOUS BLD VENIPUNCTURE: CPT

## 2019-12-13 PROCEDURE — 70544 MR ANGIOGRAPHY HEAD W/O DYE: CPT

## 2019-12-13 PROCEDURE — 84443 ASSAY THYROID STIM HORMONE: CPT

## 2019-12-13 PROCEDURE — 82607 VITAMIN B-12: CPT

## 2019-12-13 PROCEDURE — 82746 ASSAY OF FOLIC ACID SERUM: CPT

## 2019-12-13 RX ORDER — OXYBUTYNIN CHLORIDE 15 MG/1
1 TABLET, EXTENDED RELEASE ORAL DAILY
Refills: 11 | COMMUNITY
Start: 2019-12-11 | End: 2020-07-14 | Stop reason: ALTCHOICE

## 2019-12-13 NOTE — TELEPHONE ENCOUNTER
Tell Yogesh Ortegas that where are still awaiting additional thyroid test that were ordered by the neurologist   She should check with us next week

## 2019-12-13 NOTE — PROGRESS NOTES
Estephania Cleary is a 82 y o  male  Chief Complaint   Patient presents with    Follow-up     History of CVA       Assessment:  1  History of CVA (cerebrovascular accident)    2  Type 2 diabetes mellitus with stage 3 chronic kidney disease, without long-term current use of insulin ([facility] )    3  Coronary artery disease involving native coronary artery of native heart with other form of angina pectoris ([facility] )    4  Essential hypertension    5  Memory difficulty          Discussion:  Patient is currently doing well from his CVA standpoint, he is on Plavix, I am not sure if patient has atrial fibrillation since if he does then it will change the management, family to discuss with his cardiologist, also patient has dementia given his Harmon of 18/30 which could be vascular dementia, I discussed with them regarding medications like Exelon patch, they would like to hold off on that as he is going for cardiac surgery, he is scheduled to have an MRA of the brain and carotids and is scheduled to see a vascular surgeon regarding his a thorough sclerosis, he also is in follow up with the cardiothoracic surgeon regarding his coronary artery disease, stroke education given to the patient, he was advised to keep his blood pressure cholesterol and sugar under control, also was advised mentally stimulating exercises and to be under constant supervision, to take fall and safety precautions, to go to the hospital if has any worsening symptoms and call me otherwise to see me back in 4 months and follow up with his other physicians      Subjective:    HPI   Patient is here in follow-up for his history of CVA, since his last visit he has not had any stroke-like symptoms, he has coronary artery disease and is in follow up with the cardiac surgeon and is being considered for PCI versus CABG, he does have short-term memory issues and lives with his girlfriend, he is accompanied with his daughter today, he is able to do his ADLs, he does not drive, appetite is good, weight has been stable, no headaches, no dizziness, no motor or sensory symptoms in upper or lower extremities, no other complaints      Vitals:    12/13/19 1050   BP: 118/62   BP Location: Left arm   Patient Position: Sitting   Cuff Size: Adult   Pulse: 56   Weight: 100 kg (220 lb 12 8 oz)   Height: 5' 9" (1 753 m)       Current Medications    Current Outpatient Medications:     amLODIPine (NORVASC) 10 mg tablet, TAKE 1 TABLET BY MOUTH  DAILY, Disp: 90 tablet, Rfl: 1    aspirin (ECOTRIN LOW STRENGTH) 81 mg EC tablet, 1 tab daily, Disp: 30 tablet, Rfl: 4    Blood Glucose Monitoring Suppl (ONE TOUCH ULTRA MINI) w/Device KIT, by Does not apply route, Disp: , Rfl:     cholecalciferol (VITAMIN D3) 1,000 units tablet, Take 1,000 Units by mouth daily, Disp: , Rfl:     clopidogrel (PLAVIX) 75 mg tablet, TAKE 1 TABLET BY MOUTH  DAILY, Disp: 90 tablet, Rfl: 1    Cyanocobalamin ER (RA VITAMIN B-12) 1000 MCG TBCR, Take 2 tablets by mouth daily at bedtime  , Disp: , Rfl:     doxazosin (CARDURA) 8 MG tablet, Take 1 tablet (8 mg total) by mouth daily, Disp: 90 tablet, Rfl: 2    Ergocalciferol (VITAMIN D2 PO), 50,000 Units once a week , Disp: , Rfl:     gabapentin (NEURONTIN) 100 mg capsule, Take 1 capsule (100 mg total) by mouth 2 (two) times a day for 90 days, Disp: 180 capsule, Rfl: 3    glimepiride (AMARYL) 2 mg tablet, Take 2 mg by mouth every morning before breakfast, Disp: , Rfl:     glucose blood (ONE TOUCH ULTRA TEST) test strip, Test once daily, Disp: 100 each, Rfl: 5    isosorbide dinitrate (ISORDIL) 10 mg tablet, Take 1 tablet (10 mg total) by mouth 2 (two) times a day, Disp: 60 tablet, Rfl: 3    lisinopril (ZESTRIL) 40 mg tablet, Take 1 tablet (40 mg total) by mouth daily, Disp: 90 tablet, Rfl: 3    metoprolol tartrate (LOPRESSOR) 25 mg tablet, Take 1 tablet (25 mg total) by mouth every 12 (twelve) hours, Disp: 60 tablet, Rfl: 3    Omega-3 Fatty Acids (FISH OIL PO), Take 1 capsule by mouth Daily, Disp: , Rfl:     oxybutynin (DITROPAN XL) 15 MG 24 hr tablet, Take 1 tablet by mouth daily, Disp: , Rfl: 11    pyridoxine (RA VITAMIN B-6) 100 MG tablet, Take 1 tablet by mouth daily, Disp: , Rfl:       Allergies  Celecoxib; Hydromorphone; Pravastatin; and Statins    Past Medical History  Past Medical History:   Diagnosis Date    Acute ST elevation [facility] infarction [facility])     last assessed: 04/15/2016    Aortic valve disorder     Benign prostatic hyperplasia with lower urinary tract symptoms     CAD (coronary artery disease)     DM2 (diabetes mellitus, type 2) ([facility] )     History of colonoscopy     resolved: 05/08/2012    History of transfusion     History of transient cerebral ischemia     HLD (hyperlipidemia)     HTN (hypertension)     Neuralgia          Past Surgical History:  Past Surgical History:   Procedure Laterality Date    CARDIAC CATHETERIZATION      Outcome: successful; last assessed: 02/03/2015    CARDIAC CATHETERIZATION  11/26/2019    CORONARY ANGIOPLASTY WITH STENT PLACEMENT  2008    stent to LAD     HAND SURGERY      thumb    HIP HARDWARE REMOVAL      TOTAL HIP ARTHROPLASTY Right     TOTAL HIP ARTHROPLASTY Bilateral          Family History:  Family History   Problem Relation Age of Onset    Emphysema Father     Emphysema Sister        Social History:   reports that he has never smoked  He has never used smokeless tobacco  He reports that he does not drink alcohol or use drugs  I have reviewed the past medical history, surgical history, social and family history, current medications, allergies vitals, review of systems, and updated this information as appropriate today  Objective:    Physical Exam    Neurological Exam    GENERAL:  Cooperative in no acute distress  Well-developed and well-nourished    HEAD and NECK   Head is atraumatic normocephalic with no lesions or masses   Neck is supple with full range of motion    CARDIOVASCULAR  Carotid Arteries-no carotid bruits  NEUROLOGIC:  Mental Status-the patient is awake alert and oriented without aphasia or apraxia, Shannon 18/30  Cranial Nerves: Visual fields are full to confrontation  Extraocular movements are full without nystagmus  Pupils are 2-1/2 mm and reactive  Face is symmetrical to light touch  Movements of facial expression move symmetrically  Hearing is normal to finger rub bilaterally  Soft palate lifts symmetrically  Shoulder shrug is symmetrical  Tongue is midline without atrophy  Motor: No drift is noted on arm extension  Strength is full in the upper and lower extremities with normal bulk and tone  Sensory:  Decreased light touch pinprick temperature sensation in a stocking distribution Cortical function is intact  Coordination: Finger to nose testing is performed accurately  Gait has a slightly stooped posture          ROS:  Review of Systems   Constitutional: Negative  Negative for appetite change, chills, fatigue and fever  HENT: Negative  Negative for hearing loss, tinnitus, trouble swallowing and voice change  Eyes: Negative  Negative for photophobia, pain and visual disturbance  Respiratory: Positive for shortness of breath  Negative for wheezing  Cardiovascular: Positive for palpitations  Negative for chest pain  Gastrointestinal: Negative  Negative for nausea and vomiting  Endocrine: Negative  Negative for cold intolerance and heat intolerance  Genitourinary: Negative  Negative for dysuria, frequency and urgency  Musculoskeletal: Positive for arthralgias, back pain and gait problem  Negative for myalgias, neck pain and neck stiffness  Skin: Negative  Negative for rash  Allergic/Immunologic: Negative  Neurological: Positive for numbness (fingers both hands)  Negative for dizziness, tremors, seizures, syncope, facial asymmetry, speech difficulty, weakness, light-headedness and headaches  Hematological: Negative  Does not bruise/bleed easily  Psychiatric/Behavioral: Negative  Negative for confusion, hallucinations and sleep disturbance

## 2019-12-13 NOTE — TELEPHONE ENCOUNTER
----- Message from Diane Reyes MD sent at 12/13/2019  3:53 PM EST -----  Please call the patient regarding his abnormal result    Please have the patient follow up with family physician regarding abnormal TSH

## 2019-12-13 NOTE — TELEPHONE ENCOUNTER
Spoke with pt's daughter Phill Martin (On communication consent), made her aware of the below  Asked that we sent this message to Dr Keyla Rosa

## 2019-12-14 LAB — B BURGDOR IGG+IGM SER-ACNC: <0.91 ISR (ref 0–0.9)

## 2019-12-16 ENCOUNTER — TELEPHONE (OUTPATIENT)
Dept: INTERNAL MEDICINE CLINIC | Facility: CLINIC | Age: 84
End: 2019-12-16

## 2019-12-16 ENCOUNTER — OFFICE VISIT (OUTPATIENT)
Dept: CARDIAC SURGERY | Facility: CLINIC | Age: 84
End: 2019-12-16
Payer: MEDICARE

## 2019-12-16 VITALS
WEIGHT: 219 LBS | TEMPERATURE: 97.2 F | OXYGEN SATURATION: 98 % | RESPIRATION RATE: 14 BRPM | HEIGHT: 69 IN | SYSTOLIC BLOOD PRESSURE: 140 MMHG | BODY MASS INDEX: 32.44 KG/M2 | DIASTOLIC BLOOD PRESSURE: 64 MMHG | HEART RATE: 63 BPM

## 2019-12-16 DIAGNOSIS — I25.118 CORONARY ARTERY DISEASE INVOLVING NATIVE CORONARY ARTERY OF NATIVE HEART WITH OTHER FORM OF ANGINA PECTORIS (HCC): Primary | ICD-10-CM

## 2019-12-16 DIAGNOSIS — I82.452 PERONEAL DVT (DEEP VENOUS THROMBOSIS), LEFT (HCC): ICD-10-CM

## 2019-12-16 LAB — RPR SER QL: NORMAL

## 2019-12-16 PROCEDURE — 1124F ACP DISCUSS-NO DSCNMKR DOCD: CPT | Performed by: NURSE PRACTITIONER

## 2019-12-16 PROCEDURE — 99215 OFFICE O/P EST HI 40 MIN: CPT | Performed by: NURSE PRACTITIONER

## 2019-12-16 NOTE — PROGRESS NOTES
Follow up preop visit - Cardiothoracic Surgery   Maria Guadalupe Hamilton 82 y o  male MRN: [id number]    Physician Requesting Consult: Dr James De Los Santos     Reason for Consult / Principal Problem: Coronary artery disease    History of Present Illness: Maria Guadalupe Hamilton is a 82y o  year old male who returns to our office today after testing completed to discuss CABG vs PCI  In review, Maria Guadalupe Hamilton is a 82y o  year old male referred to our office for out patient surgical consultation for coronary artery disease  He is accompanied by his daughter today  Patient's PMHx is notable for CAD/MI s/p stent to LAD (2008) & stent to PDA (2016), HTN, HLD, DM2, CKD3 (GFR 33), PAfib, R MCA CVA (11/2017), L UE DVT (11/2017) and chronic back pain  Patient has developed progressive exertional dyspnea recently, now occurring with minimal activity  He also reports lightheadedness  He denies chest pain, palpitations, syncope, weight gain or edema  He lives in a mobile home with his significantly other  He reports he is independent with his ADL's and works repairing lawn mowers  He still drives  He denies any residual weakness or numbness from his previous CVA  He is followed by Neurology  He is a lifelong non-smoker  He rarely drinks alcohol  His LE vein mapping revealed a non occlusive DVT of the left peroneal artery  Recommendations from vascular surgery was to repeat the duplex to assess for propagation of thrombus to determine if anticoagulation is necessary  Patient is asymptomatic         Past Medical History:  Past Medical History:   Diagnosis Date    Acute ST elevation myocardial infarction [facility])     last assessed: 04/15/2016    Aortic valve disorder     Benign prostatic hyperplasia with lower urinary tract symptoms     CAD (coronary artery disease)     DM2 (diabetes mellitus, type 2) ([facility] [address] )     History of colonoscopy     resolved: 05/08/2012    History of transfusion     History of transient cerebral ischemia     HLD (hyperlipidemia)     HTN (hypertension)     Neuralgia          Past Surgical History:   Past Surgical History:   Procedure Laterality Date    CARDIAC CATHETERIZATION      Outcome: successful; last assessed: 02/03/2015    CARDIAC CATHETERIZATION  11/26/2019    CORONARY ANGIOPLASTY WITH STENT PLACEMENT  2008    stent to LAD     HAND SURGERY      thumb    HIP HARDWARE REMOVAL      TOTAL HIP ARTHROPLASTY Right     TOTAL HIP ARTHROPLASTY Bilateral          Family History:  Family History   Problem Relation Age of Onset    Emphysema Father     Emphysema Sister          Social History:    Social History     Substance and Sexual Activity   Alcohol Use No     Social History     Substance and Sexual Activity   Drug Use No     Social History     Tobacco Use   Smoking Status Never Smoker   Smokeless Tobacco Never Used       Home Medications:   Prior to Admission medications    Medication Sig Start Date End Date Taking?  Authorizing Provider   amLODIPine (NORVASC) 10 mg tablet TAKE 1 TABLET BY MOUTH  DAILY 7/22/19  Yes Murali Rico MD   aspirin (ECOTRIN LOW STRENGTH) 81 mg EC tablet 1 tab daily 4/1/19  Yes Vivi Galarza MD   Blood Glucose Monitoring Suppl (ONE TOUCH ULTRA MINI) w/Device KIT by Does not apply route   Yes Historical Provider, MD   cholecalciferol (VITAMIN D3) 1,000 units tablet Take 1,000 Units by mouth daily   Yes Historical Provider, MD   clopidogrel (PLAVIX) 75 mg tablet TAKE 1 TABLET BY MOUTH  DAILY 9/10/19  Yes Ke Chadwick DO   Cyanocobalamin ER (RA VITAMIN B-12) 1000 MCG TBCR Take 2 tablets by mouth daily at bedtime   1/8/18  Yes Historical Provider, MD   doxazosin (CARDURA) 8 MG tablet Take 1 tablet (8 mg total) by mouth daily 10/8/19 1/6/20 Yes Cuong Jones MD   glimepiride (AMARYL) 2 mg tablet Take 2 mg by mouth every morning before breakfast   Yes Historical Provider, MD   glucose blood (ONE TOUCH ULTRA TEST) test strip Test once daily 7/8/19  Yes Ke Chadwick DO   isosorbide dinitrate (ISORDIL) 10 mg tablet Take 1 tablet (10 mg total) by mouth 2 (two) times a day 11/26/19  Yes Natasha Veras MD   lisinopril (ZESTRIL) 40 mg tablet Take 1 tablet (40 mg total) by mouth daily 10/25/19  Yes Kendra Avalos DO   metoprolol tartrate (LOPRESSOR) 25 mg tablet Take 1 tablet (25 mg total) by mouth every 12 (twelve) hours 11/26/19  Yes Natasha Veras MD   pyridoxine (RA VITAMIN B-6) 100 MG tablet Take 1 tablet by mouth daily 1/8/18  Yes Historical Provider, MD   gabapentin (NEURONTIN) 100 mg capsule Take 1 capsule (100 mg total) by mouth 2 (two) times a day for 90 days 5/3/19 12/13/19  Kendra Avalos DO   oxybutynin (DITROPAN XL) 15 MG 24 hr tablet Take 1 tablet by mouth daily 12/11/19   Historical Provider, MD   Ergocalciferol (VITAMIN D2 PO) 50,000 Units once a week   12/16/19  Historical Provider, MD   Omega-3 Fatty Acids (FISH OIL PO) Take 1 capsule by mouth Daily  12/16/19  Historical Provider, MD       Allergies:   Allergies   Allergen Reactions    Celecoxib     Hydromorphone     Pravastatin Hives    Statins        Review of Systems:  Review of Systems - History obtained from chart review and the patient  General ROS: negative  Psychological ROS: negative  Ophthalmic ROS: uses reading glasses otherwise no visual disturbances  ENT ROS: negative  Hematological and Lymphatic ROS: negative for - bleeding problems, blood clots, bruising or jaundice  Respiratory ROS: no cough, shortness of breath, or wheezing  Cardiovascular ROS: positive for - dyspnea on exertion and exertional lightheadedness  negative for - chest pain, edema, irregular heartbeat, loss of consciousness, orthopnea, palpitations, paroxysmal nocturnal dyspnea or rapid heart rate  Gastrointestinal ROS: no abdominal pain, change in bowel habits, or black or bloody stools  Genito-Urinary ROS: no dysuria, trouble voiding, or hematuria  Musculoskeletal ROS: chronic back pain  Neurological ROS: no recent TIA or stroke symptoms  Dermatological ROS: negative    Vital Signs:     Vitals:    12/16/19 1100 12/16/19 1133   BP: 130/62 140/64   BP Location: Left arm Right arm   Cuff Size: Adult Adult   Pulse: 63    Resp: 14    Temp: (!) 97 2 °F (36 2 °C)    TempSrc: Oral    SpO2: 98%    Weight: 99 3 kg (219 lb)    Height: 5' 9" (1 753 m)        Physical Exam:    General:  Alert, oriented, well developed, no acute distress  HEENT/NECK:  PERRLA  No jugular venous distention  Cardiac:Regular rate and rhythm, I-II/VI harsh systolic murmur RUSB  Carotid arteries: 2+ pulses, no bruits  Pulmonary:  Breath sounds clear bilaterally  Abdomen:  Non-tender, Non-distended  Positive bowel sounds  Upper extremities: 2+ radial pulses; brisk capillary refill; right hand dominant  Lower extremities: Extremities warm/dry  PT/DP pulses 2+ bilaterally  No edema B/L  Musculoskeletal: MAEE, stable gait  Neuro: Alert and oriented X 3  Sensation is grossly intact  No focal deficits  Skin: Warm/Dry, without rashes or lesions  Lab Results:   Lab Results   Component Value Date    WBC 6 39 11/19/2019    HGB 13 1 11/19/2019    HCT 40 8 11/19/2019    MCV 96 11/19/2019     11/19/2019     Lab Results   Component Value Date    SODIUM 138 11/26/2019    K 4 5 11/26/2019     11/26/2019    CO2 26 11/26/2019    BUN 35 (H) 11/26/2019    CREATININE 1 55 (H) 11/26/2019    GLUC 155 (H) 11/26/2019    CALCIUM 9 1 11/26/2019     Lab Results   Component Value Date    HGBA1C 6 4 (H) 09/09/2019     Lab Results   Component Value Date    TROPONINI 0 04 11/26/2017       Imaging Studies:     Cardiac Catheterization: 11/26/2019  CORONARY CIRCULATION:  Left main: The vessel was large sized and mildly calcified  Angiography showed mild atherosclerosis  LAD: The vessel was large sized mildly calcified vessel  There is 30% stenosis in proximal LAD  There is a stent visualized in mid LAD after origin of D2 with 90% severe instent restenosis   There is 90% stenotic lesion starting at distal  edge of the stent into native mid LAD  distal LAD has luminal irregularities  2nd diagonal: The vessel was large sized  Angiography showed minor luminal irregularities  Circumflex: The vessel was large sized (dominant) and mildly calcified  Angiography showed minor luminal irregularities  1st obtuse marginal: The vessel was large sized  There is 80% stenosis in proximal OM1  Left posterior descending artery: The vessel was medium sized  Its diffusely diseased proximal with maximum stenosis of 70-80%  RCA: The vessel was small (non-dominant)  There is mid 80% stenosis     CARDIAC STRUCTURES:  No significant pressure gradient across aortic valve on LV catheter pullback  Echocardiogram: 4/5/2019   LEFT VENTRICLE:  Ejection fraction was estimated to be 60 %  There were no regional wall motion abnormalities  Concentric hypertrophy was present      LEFT ATRIUM:  The atrium was mildly to moderately dilated      MITRAL VALVE:  There was mild regurgitation      AORTIC VALVE:  There was mild stenosis      TRICUSPID VALVE:  There was mild regurgitation      CT Scan chest:   1  Extensive coronary artery calcifications  Mild cardiomegaly      2  Moderate calcification of the aortic valve      3   Mildly enlarged central pulmonary arteries, a finding which may be seen in the setting of pulmonary arterial hypertension      4  No active pulmonary disease  MRA Head:   Multifocal intracranial atherosclerotic disease with moderate to severe stenosis at the origin of the left M1 segment with additional atherosclerotic disease noted in the distal right M1 and proximal inferior left M2 branches      Moderate to severe stenosis in the proximal and mid basilar artery with nonvisualization of the right intradural vertebral artery segment    The left intradural vertebral artery segment also demonstrates atherosclerotic disease in its proximal segment, as   noted on the MRA neck study      The above-mentioned findings are relatively stable when compared to the prior CTA study of November 24, 2017    MRA Carotids:     No hemodynamically significant stenosis at the cervical carotid bifurcation      Nonvisualization of the right cervical vertebral artery  This may be secondary to its diminutive size as noted on the prior CTA study from November 24, 2017  The left vertebral artery is patent with moderate atherosclerotic disease in its intradural   segment      Moderate to severe basilar stenosis, as noted on the prior CTA study of 2017  [place] Angella Please see the separate MRA report  Vein Mapping:   Impression:  RIGHT LOWER LIMB:  The great saphenous vein is patent  from the groin to the ankle  The vein is of adequate caliber and quality for graft conduit with intraluminal  diameters ranging from 2 1mm to 4 6mm throughout the leg  LEFT LOWER LIMB:  Acute non-occlusive deep vein thrombosis in the one peroneal vein  The great saphenous vein is patent  from the groin to the ankle  The vein is of adequate caliber and quality for graft conduit with intraluminal  diameters ranging from 2 3mm to 5 9mm throughout the leg        I have personally reviewed pertinent films in PACS    Assessment:  Patient Active Problem List    Diagnosis Date Noted    Peroneal DVT (deep venous thrombosis), left 12/16/2019    Memory difficulty 12/13/2019    Hypertensive kidney disease with stage 3 chronic kidney disease ([facility] [address] ) 10/08/2019    Other proteinuria 10/08/2019    Poorly-controlled hypertension 09/10/2019    Type 2 diabetes mellitus with kidney complication, without long-term current use of insulin ([address] ) 06/27/2019    Type 2 diabetes mellitus without complication ([facility] [address] ) 45/19/0164    Hemiplegia and 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-dominant side ([address] ) 04/10/2019    Atrial fibrillation ([facility] [address] ) 03/04/2019    History of CVA (cerebrovascular accident) 02/21/2019    Stage 3 chronic kidney disease ([place] [address] ) 12/04/2018    Encounter for monitoring antiplatelet therapy 04/69/4445    Nonrheumatic aortic valve stenosis 10/09/2018    At risk for polypharmacy 01/26/2018    Stroke ([place] [address] ) 11/24/2017    HTN (hypertension) 11/24/2017    DM2 (diabetes mellitus, type 2) ([place] [address] ) 11/24/2017    HLD (hyperlipidemia) 11/24/2017    CAD (coronary artery disease) 11/24/2017    Right hip pain 11/24/2017    Acute deep vein thrombosis (DVT) of brachial vein of left upper extremity ([place] [address] ) 11/24/2017       Plan:  Risks and benefits of coronary artery bypass grafting vs coronary PCI were discussed in detail today with the patient and his daughter  Due to his history of prior CVA and existing cerebral vascular atherosclerosis, he is at higher risk for recurrent CVA intra-op or post-op  We have reviewed results all preoperative radiographic,  laboratory and vascular studies  Patient has decided to proceed with consultation for Dr Candace Stiles and probably PCI  Montrell Mateusz was comfortable with our recommendations, and his questions were answered to his satisfaction  Thank you for allowing us to participate in the care of this patient       Routine referral to gastroenterology for colonoscopy screening was not indicated, as the patient is over 78years old    SIGNATURE: Georjean Snellen, CRNP  DATE: December 16, 2019  TIME: 12:18 PM

## 2019-12-16 NOTE — TELEPHONE ENCOUNTER
DAUGHTER CALLED BACK  AND WAS NOTIFIED AND STATED PT HAD LABS DONE DONE Friday AND RESULTS IN CHART-PLEASE ADVISE

## 2019-12-18 ENCOUNTER — TELEPHONE (OUTPATIENT)
Dept: CARDIOLOGY CLINIC | Facility: CLINIC | Age: 84
End: 2019-12-18

## 2019-12-18 NOTE — TELEPHONE ENCOUNTER
Pt is currently at the dentist for an emergency visit  Pt has an infected tooth & Leonarda stated the doctor would like to know if tooth should be pulled or should pt wait due to pt having a stent placed tomorrow      Please call Leonarda at [phone] - option 2

## 2019-12-19 ENCOUNTER — HOSPITAL ENCOUNTER (OUTPATIENT)
Dept: ULTRASOUND IMAGING | Facility: CLINIC | Age: 84
Discharge: HOME | End: 2019-12-19
Payer: MEDICARE

## 2019-12-19 DIAGNOSIS — I82.452 PERONEAL DVT (DEEP VENOUS THROMBOSIS), LEFT (HCC): ICD-10-CM

## 2019-12-19 PROCEDURE — 93971 EXTREMITY STUDY: CPT | Performed by: SURGERY

## 2019-12-19 PROCEDURE — 93971 EXTREMITY STUDY: CPT

## 2019-12-19 NOTE — TELEPHONE ENCOUNTER
Called Lillian Asher and she confirmed pt has been taking antibiotics and will hold off on the extraction

## 2019-12-20 DIAGNOSIS — I25.10 CORONARY ARTERY DISEASE INVOLVING NATIVE CORONARY ARTERY OF NATIVE HEART WITHOUT ANGINA PECTORIS: ICD-10-CM

## 2019-12-20 RX ORDER — ISOSORBIDE DINITRATE 10 MG/1
10 TABLET ORAL 2 TIMES DAILY
Qty: 180 TABLET | Refills: 3 | Status: SHIPPED | OUTPATIENT
Start: 2019-12-20 | End: 2020-07-23 | Stop reason: SDUPTHER

## 2019-12-20 NOTE — TELEPHONE ENCOUNTER
Patients daughter called, the following meds need to be sent to [facility]-    isosorbide dinitrate (ISORDIL) 10 mg tablet    metoprolol tartrate (LOPRESSOR) 25 mg tablet    [facility] # [phone]    Call back # [phone]

## 2020-01-06 DIAGNOSIS — I63.9 CEREBROVASCULAR ACCIDENT (CVA), UNSPECIFIED MECHANISM (HCC): ICD-10-CM

## 2020-01-06 RX ORDER — CLOPIDOGREL BISULFATE 75 MG/1
TABLET ORAL
Qty: 90 TABLET | Refills: 0 | Status: SHIPPED | OUTPATIENT
Start: 2020-01-06 | End: 2020-03-18

## 2020-01-08 ENCOUNTER — TELEPHONE (OUTPATIENT)
Dept: CARDIOLOGY CLINIC | Facility: CLINIC | Age: 85
End: 2020-01-08

## 2020-01-08 ENCOUNTER — CONSULT (OUTPATIENT)
Dept: CARDIOLOGY CLINIC | Facility: CLINIC | Age: 85
End: 2020-01-08
Payer: MEDICARE

## 2020-01-08 VITALS
BODY MASS INDEX: 32.2 KG/M2 | WEIGHT: 224.9 LBS | HEIGHT: 70 IN | SYSTOLIC BLOOD PRESSURE: 124 MMHG | HEART RATE: 62 BPM | DIASTOLIC BLOOD PRESSURE: 62 MMHG

## 2020-01-08 DIAGNOSIS — I25.119 CORONARY ARTERY DISEASE INVOLVING NATIVE CORONARY ARTERY OF NATIVE HEART WITH ANGINA PECTORIS (HCC): Primary | ICD-10-CM

## 2020-01-08 DIAGNOSIS — I25.119 ATHEROSCLEROSIS OF NATIVE CORONARY ARTERY WITH ANGINA PECTORIS, UNSPECIFIED WHETHER NATIVE OR TRANSPLANTED HEART (HCC): Primary | ICD-10-CM

## 2020-01-08 PROCEDURE — 99213 OFFICE O/P EST LOW 20 MIN: CPT | Performed by: INTERNAL MEDICINE

## 2020-01-08 NOTE — PROGRESS NOTES
Interventional Cardiology Consultation      Mojgan Chan  1935  [id number]  [facility] [address]  [facility] [place]  [address]  [facility] [address]    1  Coronary artery disease involving native coronary artery of native heart with angina pectoris [facility])  Cardiac pci          Discussion/Summary    1  Triple-vessel coronary artery disease, deemed non CABG candidate by Cardiac surgery, with prior PCI lad (2008) and right coronary artery/PDA (2016), high-grade diffuse mid LAD disease  2  Dyspnea on exertion, class 3, unclear if due to coronary artery disease versus deconditioned  3  History of CVA with prior tPA  4  Chronic kidney disease    Plan:  Patient was deemed not a surgical candidate due to comorbidities  Here in the office today for consideration of PCI  Patient does not have any angina but relates low-level shortness of breath  His daughter feels as though something should be done with recent angiography showing high-grade LAD disease  Patient is in agreement  Therefore, will plan on performing PCI to LAD  Instructed patient and daughter that this may help a shortness of breath but it may not  Continue dual antiplatelet therapy until then      History:     Patient presents to office consideration of PCI in the setting of recent angiography showing triple-vessel disease with no significant disease in the LAD where site of prior stenting  Also has stenting the right PDA  He was referred for coronary angiography November 2019 after having low level shortness of breath by his general cardiologist Dr Ashley Bosch  This catheterization was performed at [facility]  Patient has chronic kidney disease, prior CVA with tPA 5n 2017    Follows with [address]          Patient Active Problem List   Diagnosis    Stroke ([facility] [address] )    HTN (hypertension)    DM2 (diabetes mellitus, type 2) ([facility] [address] )    HLD (hyperlipidemia)  CAD (coronary artery disease)    Right hip pain    Acute deep vein thrombosis (DVT) of brachial vein of left upper extremity (HCC)    At risk for polypharmacy    Encounter for monitoring antiplatelet therapy    Nonrheumatic aortic valve stenosis    Stage 3 chronic kidney disease (HCC)    History of CVA (cerebrovascular accident)    Atrial fibrillation (HCC)    Hemiplegia and 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-dominant side (HCC)    Type 2 diabetes mellitus without complication (HCC)    Type 2 diabetes mellitus with kidney complication, without long-term current use of insulin ([facility] )    Poorly-controlled hypertension    Hypertensive kidney disease with stage 3 chronic kidney disease (HCC)    Other proteinuria    Memory difficulty    Peroneal DVT (deep venous thrombosis), left     Past Medical History:   Diagnosis Date    Acute ST elevation myocardial infarction [facility])     last assessed: 04/15/2016    Aortic valve disorder     Benign prostatic hyperplasia with lower urinary tract symptoms     CAD (coronary artery disease)     DM2 (diabetes mellitus, type 2) ([facility] )     History of colonoscopy     resolved: 05/08/2012    History of transfusion     History of transient cerebral ischemia     HLD (hyperlipidemia)     HTN (hypertension)     Neuralgia      Social History     Socioeconomic History    Marital status:       Spouse name: Not on file    Number of children: Not on file    Years of education: Not on file    Highest education level: Not on file   Occupational History    Not on file   Social Needs    Financial resource strain: Not on file    Food insecurity:     Worry: Not on file     Inability: Not on file    Transportation needs:     Medical: Not on file     Non-medical: Not on file   Tobacco Use    Smoking status: Never Smoker    Smokeless tobacco: Never Used   Substance and Sexual Activity    Alcohol use: No    Drug use: No    Sexual activity: Not Currently     Partners: Female     Birth control/protection: None   Lifestyle    Physical activity:     Days per week: 0 days     Minutes per session: 0 min    Stress: Not at all   Relationships    Social connections:     Talks on phone: Not on file     Gets together: Not on file     Attends L[employer]-day service: Not on file     Active member of club or organization: Not on file     Attends meetings of clubs or organizations: Not on file     Relationship status: Not on file    Intimate partner violence:     Fear of current or ex partner: Not on file     Emotionally abused: Not on file     Physically abused: Not on file     Forced sexual activity: Not on file   Other Topics Concern    Not on file   Social History Narrative    Active advance directive (as per [employer])     No advance directive on file (as per [employer])       Family History   Problem Relation Age of Onset    Emphysema Father     Emphysema Sister      Past Surgical History:   Procedure Laterality Date    CARDIAC CATHETERIZATION      Outcome: successful; last assessed: 02/03/2015    CARDIAC CATHETERIZATION  11/26/2019    CORONARY ANGIOPLASTY WITH STENT PLACEMENT  2008    stent to LAD     HAND SURGERY      thumb    HIP HARDWARE REMOVAL      TOTAL HIP ARTHROPLASTY Right     TOTAL HIP ARTHROPLASTY Bilateral        Current Outpatient Medications:     amLODIPine (NORVASC) 10 mg tablet, TAKE 1 TABLET BY MOUTH  DAILY, Disp: 90 tablet, Rfl: 1    aspirin (ECOTRIN LOW STRENGTH) 81 mg EC tablet, 1 tab daily, Disp: 30 tablet, Rfl: 4    Blood Glucose Monitoring Suppl (ONE TOUCH ULTRA MINI) w/Device KIT, by Does not apply route, Disp: , Rfl:     cholecalciferol (VITAMIN D3) 1,000 units tablet, Take 1,000 Units by mouth daily, Disp: , Rfl:     clopidogrel (PLAVIX) 75 mg tablet, TAKE 1 TABLET BY MOUTH  DAILY, Disp: 90 tablet, Rfl: 0    Cyanocobalamin ER (RA VITAMIN B-12) 1000 MCG TBCR, Take 2 tablets by mouth daily at bedtime  , Disp: , Rfl:     doxazosin (CARDURA) 8 MG tablet, Take 1 tablet (8 mg total) by mouth daily, Disp: 90 tablet, Rfl: 2    gabapentin (NEURONTIN) 100 mg capsule, Take 1 capsule (100 mg total) by mouth 2 (two) times a day for 90 days, Disp: 180 capsule, Rfl: 3    GLIMEPIRIDE PO, Take 5 mg by mouth every morning before breakfast , Disp: , Rfl:     glucose blood (ONE TOUCH ULTRA TEST) test strip, Test once daily, Disp: 100 each, Rfl: 5    isosorbide dinitrate (ISORDIL) 10 mg tablet, Take 1 tablet (10 mg total) by mouth 2 (two) times a day, Disp: 180 tablet, Rfl: 3    lisinopril (ZESTRIL) 40 mg tablet, Take 1 tablet (40 mg total) by mouth daily, Disp: 90 tablet, Rfl: 3    metoprolol tartrate (LOPRESSOR) 25 mg tablet, Take 1 tablet (25 mg total) by mouth every 12 (twelve) hours, Disp: 180 tablet, Rfl: 3    pyridoxine (RA VITAMIN B-6) 100 MG tablet, Take 1 tablet by mouth daily, Disp: , Rfl:     oxybutynin (DITROPAN XL) 15 MG 24 hr tablet, Take 1 tablet by mouth daily, Disp: , Rfl: 11  Allergies   Allergen Reactions    Celecoxib     Hydromorphone     Pravastatin Hives    Statins        Social, Family and medication history as listed, reviewed and updated as necessary    Labs:   Lab Results   Component Value Date     12/01/2015    K 4 5 11/26/2019     11/26/2019    CO2 26 11/26/2019    BUN 35 (H) 11/26/2019    CREATININE 1 55 (H) 11/26/2019    CREATININE 1 87 (H) 11/19/2019    GLUCOSE 171 (H) 11/24/2017    CALCIUM 9 1 11/26/2019       Lab Results   Component Value Date    WBC 6 39 11/19/2019    HGB 13 1 11/19/2019    HGB 13 3 10/08/2019    HCT 40 8 11/19/2019    HCT 41 1 10/08/2019     11/19/2019     10/08/2019       Lab Results   Component Value Date    CHOL 190 12/01/2015    CHOL 210 07/14/2015     Lab Results   Component Value Date    HDL 47 09/09/2019    HDL 52 11/26/2018     Lab Results   Component Value Date    LDLCALC 113 (H) 09/09/2019    LDLCALC 119 (H) 11/26/2018     Lab Results Component Value Date    TRIG 77 09/09/2019    TRIG 71 11/26/2018     No results found for: LDLDIRECT    Lab Results   Component Value Date    ALT 21 11/19/2019    ALT 20 09/09/2019    AST 17 11/19/2019    AST 14 09/09/2019             No results found for: NTBNP    Lab Results   Component Value Date    HGBA1C 6 4 (H) 09/09/2019    HGBA1C 6 1 03/08/2019    HGBA1C 6 0 11/26/2018       Imaging: Reviewed in epic      Review of Systems:  14 systems reviewed and negative with exception of the above       PHYSICAL EXAM:        Vitals:    01/08/20 1325   BP: 124/62   Pulse: 62     Body mass index is 32 27 kg/m²  Weight (last 2 days)     Date/Time   Weight    01/08/20 1325   102 (224 9)                 Gen: No acute distress  HEENT: anicteric, mucous membranes moist  Neck: supple, no jugular venous distention, or carotid bruit  Heart: regular, normal s1 and s2, no murmur/rub or gallop  Lungs :clear to auscultation bilaterally, no rales/rhonchi or wheeze  Abdomen: soft nontender, normoactive bowel sounds, no organomegaly  Ext: warm and perfused, normal femoral pulses, no edema, or clubbing  Skin: warm, no rashes  Neuro: AAO x 3, no focal findings  Psychiatric: normal affect  Musculoskeletal: no obvious joint deformities

## 2020-01-08 NOTE — TELEPHONE ENCOUNTER
Pt is scheduled on 01/30/20 for a PCI with Dr Ebony Vega at [facility]  Pt in the office for instructions  Pt has Medicare as the insurance

## 2020-01-08 NOTE — H&P (VIEW-ONLY)
Interventional Cardiology Consultation      Terese Arriaga  1935  [id number]  [facility] [address]  DILMA Mode  [address]  [phone]    1  Coronary artery disease involving native coronary artery of native heart with angina pectoris [facility])  Cardiac pci          Discussion/Summary    1  Triple-vessel coronary artery disease, deemed non CABG candidate by Cardiac surgery, with prior PCI lad (2008) and right coronary artery/PDA (2016), high-grade diffuse mid LAD disease  2  Dyspnea on exertion, class 3, unclear if due to coronary artery disease versus deconditioned  3  History of CVA with prior tPA  4  Chronic kidney disease    Plan:  Patient was deemed not a surgical candidate due to comorbidities  Here in the office today for consideration of PCI  Patient does not have any angina but relates low-level shortness of breath  His daughter feels as though something should be done with recent angiography showing high-grade LAD disease  Patient is in agreement  Therefore, will plan on performing PCI to LAD  Instructed patient and daughter that this may help a shortness of breath but it may not  Continue dual antiplatelet therapy until then      History:     Patient presents to office consideration of PCI in the setting of recent angiography showing triple-vessel disease with no significant disease in the LAD where site of prior stenting  Also has stenting the right PDA  He was referred for coronary angiography November 2019 after having low level shortness of breath by his general cardiologist Dr Ramesh Marinelli  This catheterization was performed at [address] [facility]  Patient has chronic kidney disease, prior CVA with tPA 5n 2017    Follows with [address]          Patient Active Problem List   Diagnosis    Stroke ([facility] )    HTN (hypertension)    DM2 (diabetes mellitus, type 2) ([facility] )    HLD (hyperlipidemia)  CAD (coronary artery disease)    Right hip pain    Acute deep vein thrombosis (DVT) of brachial vein of left upper extremity (HCC)    At risk for polypharmacy    Encounter for monitoring antiplatelet therapy    Nonrheumatic aortic valve stenosis    Stage 3 chronic kidney disease (HCC)    History of CVA (cerebrovascular accident)    Atrial fibrillation (HCC)    Hemiplegia and 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-dominant side (HCC)    Type 2 diabetes mellitus without complication (HCC)    Type 2 diabetes mellitus with kidney complication, without long-term current use of insulin ([facility] )    Poorly-controlled hypertension    Hypertensive kidney disease with stage 3 chronic kidney disease (HCC)    Other proteinuria    Memory difficulty    Peroneal DVT (deep venous thrombosis), left     Past Medical History:   Diagnosis Date    Acute ST elevation myocardial infarction [facility])     last assessed: 04/15/2016    Aortic valve disorder     Benign prostatic hyperplasia with lower urinary tract symptoms     CAD (coronary artery disease)     DM2 (diabetes mellitus, type 2) ([facility] )     History of colonoscopy     resolved: 05/08/2012    History of transfusion     History of transient cerebral ischemia     HLD (hyperlipidemia)     HTN (hypertension)     Neuralgia      Social History     Socioeconomic History    Marital status:       Spouse name: Not on file    Number of children: Not on file    Years of education: Not on file    Highest education level: Not on file   Occupational History    Not on file   Social Needs    Financial resource strain: Not on file    Food insecurity:     Worry: Not on file     Inability: Not on file    Transportation needs:     Medical: Not on file     Non-medical: Not on file   Tobacco Use    Smoking status: Never Smoker    Smokeless tobacco: Never Used   Substance and Sexual Activity    Alcohol use: No    Drug use: No    Sexual activity: Not Currently     Partners: Female     Birth control/protection: None   Lifestyle    Physical activity:     Days per week: 0 days     Minutes per session: 0 min    Stress: Not at all   Relationships    Social connections:     Talks on phone: Not on file     Gets together: Not on file     Attends Taoism service: Not on file     Active member of club or organization: Not on file     Attends meetings of clubs or organizations: Not on file     Relationship status: Not on file    Intimate partner violence:     Fear of current or ex partner: Not on file     Emotionally abused: Not on file     Physically abused: Not on file     Forced sexual activity: Not on file   Other Topics Concern    Not on file   Social History Narrative    Active advance directive (as per [employer])     No advance directive on file (as per [employer])       Family History   Problem Relation Age of Onset    Emphysema Father     Emphysema Sister      Past Surgical History:   Procedure Laterality Date    CARDIAC CATHETERIZATION      Outcome: successful; last assessed: 02/03/2015    CARDIAC CATHETERIZATION  11/26/2019    CORONARY ANGIOPLASTY WITH STENT PLACEMENT  2008    stent to LAD     HAND SURGERY      thumb    HIP HARDWARE REMOVAL      TOTAL HIP ARTHROPLASTY Right     TOTAL HIP ARTHROPLASTY Bilateral        Current Outpatient Medications:     amLODIPine (NORVASC) 10 mg tablet, TAKE 1 TABLET BY MOUTH  DAILY, Disp: 90 tablet, Rfl: 1    aspirin (ECOTRIN LOW STRENGTH) 81 mg EC tablet, 1 tab daily, Disp: 30 tablet, Rfl: 4    Blood Glucose Monitoring Suppl (ONE TOUCH ULTRA MINI) w/Device KIT, by Does not apply route, Disp: , Rfl:     cholecalciferol (VITAMIN D3) 1,000 units tablet, Take 1,000 Units by mouth daily, Disp: , Rfl:     clopidogrel (PLAVIX) 75 mg tablet, TAKE 1 TABLET BY MOUTH  DAILY, Disp: 90 tablet, Rfl: 0    Cyanocobalamin ER (RA VITAMIN B-12) 1000 MCG TBCR, Take 2 tablets by mouth daily at bedtime  , Disp: , Rfl:     doxazosin (CARDURA) 8 MG tablet, Take 1 tablet (8 mg total) by mouth daily, Disp: 90 tablet, Rfl: 2    gabapentin (NEURONTIN) 100 mg capsule, Take 1 capsule (100 mg total) by mouth 2 (two) times a day for 90 days, Disp: 180 capsule, Rfl: 3    GLIMEPIRIDE PO, Take 5 mg by mouth every morning before breakfast , Disp: , Rfl:     glucose blood (ONE TOUCH ULTRA TEST) test strip, Test once daily, Disp: 100 each, Rfl: 5    isosorbide dinitrate (ISORDIL) 10 mg tablet, Take 1 tablet (10 mg total) by mouth 2 (two) times a day, Disp: 180 tablet, Rfl: 3    lisinopril (ZESTRIL) 40 mg tablet, Take 1 tablet (40 mg total) by mouth daily, Disp: 90 tablet, Rfl: 3    metoprolol tartrate (LOPRESSOR) 25 mg tablet, Take 1 tablet (25 mg total) by mouth every 12 (twelve) hours, Disp: 180 tablet, Rfl: 3    pyridoxine (RA VITAMIN B-6) 100 MG tablet, Take 1 tablet by mouth daily, Disp: , Rfl:     oxybutynin (DITROPAN XL) 15 MG 24 hr tablet, Take 1 tablet by mouth daily, Disp: , Rfl: 11  Allergies   Allergen Reactions    Celecoxib     Hydromorphone     Pravastatin Hives    Statins        Social, Family and medication history as listed, reviewed and updated as necessary    Labs:   Lab Results   Component Value Date     12/01/2015    K 4 5 11/26/2019     11/26/2019    CO2 26 11/26/2019    BUN 35 (H) 11/26/2019    CREATININE 1 55 (H) 11/26/2019    CREATININE 1 87 (H) 11/19/2019    GLUCOSE 171 (H) 11/24/2017    CALCIUM 9 1 11/26/2019       Lab Results   Component Value Date    WBC 6 39 11/19/2019    HGB 13 1 11/19/2019    HGB 13 3 10/08/2019    HCT 40 8 11/19/2019    HCT 41 1 10/08/2019     11/19/2019     10/08/2019       Lab Results   Component Value Date    CHOL 190 12/01/2015    CHOL 210 07/14/2015     Lab Results   Component Value Date    HDL 47 09/09/2019    HDL 52 11/26/2018     Lab Results   Component Value Date    LDLCALC 113 (H) 09/09/2019    LDLCALC 119 (H) 11/26/2018     Lab Results Component Value Date    TRIG 77 09/09/2019    TRIG 71 11/26/2018     No results found for: LDLDIRECT    Lab Results   Component Value Date    ALT 21 11/19/2019    ALT 20 09/09/2019    AST 17 11/19/2019    AST 14 09/09/2019             No results found for: NTBNP    Lab Results   Component Value Date    HGBA1C 6 4 (H) 09/09/2019    HGBA1C 6 1 03/08/2019    HGBA1C 6 0 11/26/2018       Imaging: Reviewed in epic      Review of Systems:  14 systems reviewed and negative with exception of the above       PHYSICAL EXAM:        Vitals:    01/08/20 1325   BP: 124/62   Pulse: 62     Body mass index is 32 27 kg/m²  Weight (last 2 days)     Date/Time   Weight    01/08/20 1325   102 (224 9)                 Gen: No acute distress  HEENT: anicteric, mucous membranes moist  Neck: supple, no jugular venous distention, or carotid bruit  Heart: regular, normal s1 and s2, no murmur/rub or gallop  Lungs :clear to auscultation bilaterally, no rales/rhonchi or wheeze  Abdomen: soft nontender, normoactive bowel sounds, no organomegaly  Ext: warm and perfused, normal femoral pulses, no edema, or clubbing  Skin: warm, no rashes  Neuro: AAO x 3, no focal findings  Psychiatric: normal affect  Musculoskeletal: no obvious joint deformities

## 2020-01-10 NOTE — PROGRESS NOTES
Continue ointment as directed. Progress Note - Eliane Olivera 80 y o  male MRN: [id number]    Unit/Bed#: -01 Encounter: [id number]            Subjective:   Pt w/o complaint currently     Objective:     ROS  Gen: denies recent wt loss   Psych: denies mood change    Vitals:    12/08/17 0821   BP: 126/52   Pulse:    Resp:    Temp:    SpO2:    T 98 3  HR 74  RR 20  POx 97%      Physical Exam:     Gen:        NAD   Neck:   trachea midline  Lungs:  respirations unlabored   Heart:    + S1 and S2   Abdomen:    Soft, non-tender  Psych: mood/affect appropriate  Neurologic: awake, alert, appropriately answering questions     Functional :  Mobility: min   Tx: mod  ADLs: mod        acetaminophen 975 mg Oral Q8H [address]   amLODIPine 10 mg Oral Daily   aspirin 324 mg Oral Daily   ergocalciferol 50,000 Units Oral Weekly   Followed by      Grace Black ON 1/17/2018] cholecalciferol 1,000 Units Oral Daily   doxazosin 4 mg Oral Daily   enoxaparin 1 mg/kg Subcutaneous D23M [address]   folic acid-pyridoxine-cyanocobalamin 1 tablet Oral Daily   glyBURIDE 5 mg Oral Daily With Breakfast   insulin lispro 1-5 Units Subcutaneous TID AC   insulin lispro 1-5 Units Subcutaneous HS   lisinopril 40 mg Oral Daily   metoprolol tartrate 75 mg Oral Q12H SACHIN   polyethylene glycol 17 g Oral Daily   warfarin 7 5 mg Oral Daily (warfarin)       bisacodyl    magnesium hydroxide    traMADol      Assessment:  Patient is a 79 yo male s/p revision of Rt MAREN by Dr Bobbie Siemens on 11/20/17 p/w stroke like symptoms and was given TPA however developed Rt thigh/gluteal hematoma; course complicated by LUE DVT    Plan:    Rehabilitation: cont PT/OT for ambulatory/ADL dysfxn    Pain: currently on tylenol ATC, intolerant to 5 mg Oxy IR per family (excessive sedation), allergy to dilaudid; trialed tramadol in the past prior to surgery for hip pain without relief, per patient and family at this time they would like to use tylenol only      Rt MAREN revison: performed on 11/20/17 by Dr Bobbie Siemens, pt is 2 wks post op discussed case with Dr Mata Zaldivar and in the setting of hematoma Dr Mata Zaldivar has recommended leaving staples in for 1 more week and then reassess if staples can be removed then; hip precautions & WBAT per ortho and confirmed with Dr Mata RODRIGUEZ DVT: lovenox to coumadin bridge (INR 1 58); noted to have mildly decreased AT III level at 88 (LLN 92) and + Lupus anticoagulant (per lab recs this is to be repeated in 12 wks to confirm); FU with Dr Emma Smart to arrange FU testing and management of AC as OP     Rt thigh/gluteal hematoma: monitor Hg, cleared by surgery to be on lovenox to coumadin bridge (INR 1 58)  and aspirin 325 mg QD      Likely CVA: per neurology cleared to dc home plavix and instead increase home ASA 81 mg QD to   mg QD (due to non-response at ASA 81 mg QD per API) with AC and once AC stopped at Dr Prescott Began discretion in 6 months neurology recommends dual AP therapy; API in 1 wk; patient with allergy to statins (myalgia not hives)     CAD s/p stents: per cards cleared to dc home plavix and instead increase home ASA 81 mg QD to  mg QD due to non-response to ASA 81 mg QD per API for stroke prevention (in addition to [facility] and once [facility] stopped at Dr Prescott Began discretion neurology recommends dual AP therpy); on BB, patient with allergy to statins (myalgia not hives); FU with Dr Efren Stiles     DM: per OP records patient take glyburide 10 mg QAM and 5 mg QPM however given CKD will defer to IM regarding risk/benefits of this agent in patient with stable CKD versus switch to short-acting sulfonylureas; currently on glyburide 5 mg QAM and ISS, per IM     HTN: on home regimen of cardura 4 mg qd (also for BPH), norvasc 10 mg qdaily, lisinopril 40 mg qd, however home toprol XL 25 mg Q12 has been switched to lopressor 75 mg q12     elevated PSA/BPH/incontinence: on home cardura 4 mg qd; was also on oxybutynin 5 mg qd at home which has not yet been restarted     basal cell carcinoma: FU with   [facility] who may refer patient for Mohs surgery  PAD (B/L renal artery stenosis, & celiac trunk stenosis): follows with Dr Melvin Cordova, currently on AP for CAD and possible CVA, allergy to statins (myalgia not hives)    Vitamin D insufficiency: 20 4 (LLN 30) on 12/6/17, started on ergocalciferol 50,000 units Qweekly x 6 doses starting 12/6/17     Hyponatremia: mild at 134, IM monitoring   CKD: baseline Cr 1 5-1 7, currently 1 38, FU with Dr Chris Alvarado  Anemia: ABLA with likely component of AOCD (CKD);  Hg currently stable at 8 2  Thrombocytosis: currently WNL        DVT ppx: lovenox to coumadin bridge (INR currently 1 58)  Dispo: reteam     Incidental lab findings: hyperparathyroidism (noted to be mild elevated above ULN of 72 at 78 9 on 1/18/17 likely 2/2 to CKD, on supplemental Vit D, OP FU with Dr Chris Alvarado), elevated total/direct bilirubin (sample hemolyzed, OP FU with PCP for repeat testing and/or specialist referral at PCPs discretion), elevated homocysteine level (mildly elevated above ULN of 14 2 at 15 1, started on a folic TVYQ/L5/G78 tablet in acute care; OP FU w/ Dr Shabbir Arceo for B6/B12/Folate testing at Dr Gustavo Marrero discretion and OP FU with patient's own cardiologist Dr Lane Do)      Incidental findings of EKG of 11/26/17 15:17 per Dr Anay Harrison interpretation: PVCs, RBBB/wide QRS, borderline UT interval (200) and prolonged QTc; evaluated and cleared from cards standpoint (seen by Dr Anay Harrison on 11/26 and Dr Lacy Lee on 11/27) in acute care; OP FU with Dr Lane Do      Incidental findings of echo (11/25): very mild AS, aortic root dilation/ascending aorta dilation; OP FU with Dr Lane Do     Incidental findings noted on CT A/P (11/30) & CT C/A/P (11/24): L renal lesions (noted to be stable renal cysts that appear to be simple on U/S of 11/28, OP FU with Dr  Oneita Calamity), hiatal hernia (no c/o of GERD/reflux symptoms, OP FU with PCP), sebaceous cyst anterior to the sternum (OP FU with PCP and/or Dr Deejay Fernandez)      Other incidental findings: Rt vertebral artery occlusion, OP FU at [address] (follows with Dr Claris Pallas for PAD)

## 2020-01-15 DIAGNOSIS — I10 ESSENTIAL HYPERTENSION: ICD-10-CM

## 2020-01-16 RX ORDER — AMLODIPINE BESYLATE 10 MG/1
TABLET ORAL
Qty: 90 TABLET | Refills: 0 | Status: SHIPPED | OUTPATIENT
Start: 2020-01-16 | End: 2020-03-29

## 2020-01-21 ENCOUNTER — OFFICE VISIT (OUTPATIENT)
Dept: INTERNAL MEDICINE CLINIC | Facility: CLINIC | Age: 85
End: 2020-01-21
Payer: MEDICARE

## 2020-01-21 ENCOUNTER — APPOINTMENT (OUTPATIENT)
Dept: LAB | Facility: CLINIC | Age: 85
End: 2020-01-21
Payer: MEDICARE

## 2020-01-21 VITALS
WEIGHT: 224.2 LBS | BODY MASS INDEX: 32.1 KG/M2 | HEART RATE: 52 BPM | SYSTOLIC BLOOD PRESSURE: 150 MMHG | OXYGEN SATURATION: 95 % | HEIGHT: 70 IN | DIASTOLIC BLOOD PRESSURE: 60 MMHG

## 2020-01-21 DIAGNOSIS — I63.9 CEREBROVASCULAR ACCIDENT (CVA), UNSPECIFIED MECHANISM (HCC): ICD-10-CM

## 2020-01-21 DIAGNOSIS — Z86.73 HISTORY OF CVA (CEREBROVASCULAR ACCIDENT): ICD-10-CM

## 2020-01-21 DIAGNOSIS — N18.30 TYPE 2 DIABETES MELLITUS WITH STAGE 3 CHRONIC KIDNEY DISEASE, WITHOUT LONG-TERM CURRENT USE OF INSULIN (HCC): ICD-10-CM

## 2020-01-21 DIAGNOSIS — I25.119 ATHEROSCLEROSIS OF NATIVE CORONARY ARTERY WITH ANGINA PECTORIS, UNSPECIFIED WHETHER NATIVE OR TRANSPLANTED HEART (HCC): ICD-10-CM

## 2020-01-21 DIAGNOSIS — I82.452 PERONEAL DVT (DEEP VENOUS THROMBOSIS), LEFT (HCC): ICD-10-CM

## 2020-01-21 DIAGNOSIS — I69.354 HEMIPLEGIA AND HEMIPARESIS FOLLOWING CEREBRAL INFARCTION AFFECTING LEFT NON-DOMINANT SIDE (HCC): ICD-10-CM

## 2020-01-21 DIAGNOSIS — I25.118 CORONARY ARTERY DISEASE INVOLVING NATIVE CORONARY ARTERY OF NATIVE HEART WITH OTHER FORM OF ANGINA PECTORIS (HCC): Primary | ICD-10-CM

## 2020-01-21 DIAGNOSIS — E11.22 TYPE 2 DIABETES MELLITUS WITH STAGE 3 CHRONIC KIDNEY DISEASE, WITHOUT LONG-TERM CURRENT USE OF INSULIN (HCC): ICD-10-CM

## 2020-01-21 DIAGNOSIS — I48.91 ATRIAL FIBRILLATION, UNSPECIFIED TYPE (HCC): ICD-10-CM

## 2020-01-21 LAB
ALBUMIN SERPL BCP-MCNC: 3.5 G/DL (ref 3.5–5)
ALP SERPL-CCNC: 60 U/L (ref 46–116)
ALT SERPL W P-5'-P-CCNC: 18 U/L (ref 12–78)
ANION GAP SERPL CALCULATED.3IONS-SCNC: 5 MMOL/L (ref 4–13)
AST SERPL W P-5'-P-CCNC: 14 U/L (ref 5–45)
BASOPHILS # BLD AUTO: 0.05 THOUSANDS/ΜL (ref 0–0.1)
BASOPHILS NFR BLD AUTO: 1 % (ref 0–1)
BILIRUB SERPL-MCNC: 0.3 MG/DL (ref 0.2–1)
BUN SERPL-MCNC: 41 MG/DL (ref 5–25)
CALCIUM SERPL-MCNC: 9.2 MG/DL (ref 8.3–10.1)
CHLORIDE SERPL-SCNC: 104 MMOL/L (ref 100–108)
CO2 SERPL-SCNC: 28 MMOL/L (ref 21–32)
CREAT SERPL-MCNC: 1.93 MG/DL (ref 0.6–1.3)
EOSINOPHIL # BLD AUTO: 0.38 THOUSAND/ΜL (ref 0–0.61)
EOSINOPHIL NFR BLD AUTO: 6 % (ref 0–6)
ERYTHROCYTE [DISTWIDTH] IN BLOOD BY AUTOMATED COUNT: 12.8 % (ref 11.6–15.1)
GFR SERPL CREATININE-BSD FRML MDRD: 32 ML/MIN/1.73SQ M
GLUCOSE P FAST SERPL-MCNC: 186 MG/DL (ref 65–99)
HCT VFR BLD AUTO: 39 % (ref 36.5–49.3)
HGB BLD-MCNC: 12.7 G/DL (ref 12–17)
IMM GRANULOCYTES # BLD AUTO: 0.01 THOUSAND/UL (ref 0–0.2)
IMM GRANULOCYTES NFR BLD AUTO: 0 % (ref 0–2)
LYMPHOCYTES # BLD AUTO: 2.46 THOUSANDS/ΜL (ref 0.6–4.47)
LYMPHOCYTES NFR BLD AUTO: 38 % (ref 14–44)
MCH RBC QN AUTO: 31.4 PG (ref 26.8–34.3)
MCHC RBC AUTO-ENTMCNC: 32.6 G/DL (ref 31.4–37.4)
MCV RBC AUTO: 96 FL (ref 82–98)
MONOCYTES # BLD AUTO: 0.45 THOUSAND/ΜL (ref 0.17–1.22)
MONOCYTES NFR BLD AUTO: 7 % (ref 4–12)
NEUTROPHILS # BLD AUTO: 3.11 THOUSANDS/ΜL (ref 1.85–7.62)
NEUTS SEG NFR BLD AUTO: 48 % (ref 43–75)
NRBC BLD AUTO-RTO: 0 /100 WBCS
PLATELET # BLD AUTO: 165 THOUSANDS/UL (ref 149–390)
PMV BLD AUTO: 10.6 FL (ref 8.9–12.7)
POTASSIUM SERPL-SCNC: 4.2 MMOL/L (ref 3.5–5.3)
PROT SERPL-MCNC: 7.4 G/DL (ref 6.4–8.2)
RBC # BLD AUTO: 4.05 MILLION/UL (ref 3.88–5.62)
SODIUM SERPL-SCNC: 137 MMOL/L (ref 136–145)
WBC # BLD AUTO: 6.46 THOUSAND/UL (ref 4.31–10.16)

## 2020-01-21 PROCEDURE — 80053 COMPREHEN METABOLIC PANEL: CPT

## 2020-01-21 PROCEDURE — 99214 OFFICE O/P EST MOD 30 MIN: CPT | Performed by: INTERNAL MEDICINE

## 2020-01-21 PROCEDURE — 85025 COMPLETE CBC W/AUTO DIFF WBC: CPT

## 2020-01-21 PROCEDURE — 36415 COLL VENOUS BLD VENIPUNCTURE: CPT

## 2020-01-21 NOTE — PROGRESS NOTES
Assessment/Plan:  Regarding coronary artery disease he is getting a catheterization next week  Regarding his blood sugars he did not bring a list   Not certain where we are as far as that  Will check A1c today  He will bring a list of blood sugars taking it 3 times a day for the next week  He will bring in the list and we will make adjustments to his medicines  At the moment he is not on his glimepiride  He will stay off of that until I see the list of blood sugars next week  1  Coronary artery disease involving native coronary artery of native heart with other form of angina pectoris ([facility] )     2  Cerebrovascular accident (CVA), unspecified mechanism ([facility] )     3  Type 2 diabetes mellitus with stage 3 chronic kidney disease, without long-term current use of insulin ([facility])  HEMOGLOBIN A1C W/ EAG ESTIMATION   4  History of CVA (cerebrovascular accident)     5  Hemiplegia and 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-dominant side ([facility] )     6  Peroneal DVT (deep venous thrombosis), left     7  Atrial fibrillation, unspecified type ([facility] )         Orders Placed This Encounter   Procedures    HEMOGLOBIN A1C W/ EAG ESTIMATION         Subjective:  Coronary artery disease     Patient ID: Montrell Child is a 82 y o  male  HPI he comes in for follow-up  Since I saw him he was seen by Cardiology  He had a catheterization and was found not to be a surgical candidate though he has multiple obstructions  He is going to have a catheterization with stent placement  That scheduled for 10 days from now  Blood sugars are variable but he did not bring me a list   He has been as low as the 60s and has not taken his glyburide since December because of lack of availability  He says that his eyes 200  He has not had any chance angina but has shortness of breath on exertion      The following portions of the patient's history were reviewed and updated as appropriate:   He has a past medical history of Acute ST elevation myocardial infarction [facility]), Aortic valve disorder, Benign prostatic hyperplasia with lower urinary tract symptoms, CAD (coronary artery disease), DM2 (diabetes mellitus, type 2) ([facility] ), History of colonoscopy, History of transfusion, History of transient cerebral ischemia, HLD (hyperlipidemia), HTN (hypertension), and Neuralgia ,  does not have any pertinent problems on file  ,   has a past surgical history that includes Total hip arthroplasty (Right); Hip hardware removal; Cardiac catheterization; Coronary angioplasty with stent (2008); Hand surgery; Total hip arthroplasty (Bilateral); and Cardiac catheterization (11/26/2019)  ,  family history includes Emphysema in his father and sister  ,   reports that he has never smoked  He has never used smokeless tobacco  He reports that he does not drink alcohol or use drugs  ,  is allergic to celecoxib; hydromorphone; pravastatin; and statins       Current Outpatient Medications:     amLODIPine (NORVASC) 10 mg tablet, TAKE 1 TABLET BY MOUTH  DAILY, Disp: 90 tablet, Rfl: 0    aspirin (ECOTRIN LOW STRENGTH) 81 mg EC tablet, 1 tab daily, Disp: 30 tablet, Rfl: 4    Blood Glucose Monitoring Suppl (ONE TOUCH ULTRA MINI) w/Device KIT, by Does not apply route, Disp: , Rfl:     cholecalciferol (VITAMIN D3) 1,000 units tablet, Take 1,000 Units by mouth daily, Disp: , Rfl:     clopidogrel (PLAVIX) 75 mg tablet, TAKE 1 TABLET BY MOUTH  DAILY, Disp: 90 tablet, Rfl: 0    Cyanocobalamin ER (RA VITAMIN B-12) 1000 MCG TBCR, Take 2 tablets by mouth daily at bedtime  , Disp: , Rfl:     doxazosin (CARDURA) 8 MG tablet, Take 1 tablet (8 mg total) by mouth daily, Disp: 90 tablet, Rfl: 2    gabapentin (NEURONTIN) 100 mg capsule, Take 1 capsule (100 mg total) by mouth 2 (two) times a day for 90 days, Disp: 180 capsule, Rfl: 3    GLIMEPIRIDE PO, Take 5 mg by mouth every morning before breakfast , Disp: , Rfl:     glucose blood (ONE TOUCH ULTRA TEST) test strip, Test once daily, Disp: 100 each, Rfl: 5    isosorbide dinitrate (ISORDIL) 10 mg tablet, Take 1 tablet (10 mg total) by mouth 2 (two) times a day, Disp: 180 tablet, Rfl: 3    lisinopril (ZESTRIL) 40 mg tablet, Take 1 tablet (40 mg total) by mouth daily, Disp: 90 tablet, Rfl: 3    metoprolol tartrate (LOPRESSOR) 25 mg tablet, Take 1 tablet (25 mg total) by mouth every 12 (twelve) hours, Disp: 180 tablet, Rfl: 3    oxybutynin (DITROPAN XL) 15 MG 24 hr tablet, Take 1 tablet by mouth daily, Disp: , Rfl: 11    pyridoxine (RA VITAMIN B-6) 100 MG tablet, Take 1 tablet by mouth daily, Disp: , Rfl:     Review of Systems   Constitutional: Positive for appetite change and fatigue  Negative for activity change, chills, diaphoresis, fever and unexpected weight change  HENT: Negative for congestion, ear pain, hearing loss, mouth sores, nosebleeds, postnasal drip, sinus pressure, sinus pain, sore throat and trouble swallowing  Eyes: Negative for pain, discharge and visual disturbance  Respiratory: Positive for shortness of breath  Negative for apnea, cough, chest tightness and wheezing  Cardiovascular: Negative for chest pain, palpitations and leg swelling  Gastrointestinal: Negative for abdominal pain, anal bleeding, blood in stool, constipation, diarrhea, nausea and vomiting  Endocrine: Negative for polydipsia and polyphagia  Genitourinary: Negative for decreased urine volume, dysuria, flank pain, frequency, hematuria and urgency  Musculoskeletal: Negative for arthralgias, back pain, gait problem, joint swelling and myalgias  Skin: Negative for rash and wound  Allergic/Immunologic: Negative for environmental allergies and food allergies  Neurological: Negative for dizziness, tremors, seizures, syncope, speech difficulty, light-headedness, numbness and headaches  He is status post CVA  Hematological: Negative for adenopathy  Does not bruise/bleed easily     Psychiatric/Behavioral: Negative for agitation, confusion, hallucinations, sleep disturbance and suicidal ideas  The patient is not nervous/anxious  Objective:  /60 (BP Location: Left arm, Patient Position: Sitting, Cuff Size: Standard)   Pulse (!) 52   Ht 5' 10" (1 778 m)   Wt 102 kg (224 lb 3 2 oz)   SpO2 95%   BMI 32 17 kg/m²      Physical Exam   Constitutional: He appears well-developed  No distress  Moderately overweight  Blood pressure is 120/60  Rhythm is regular  Rate is 70  Respirations are 20   HENT:   Head: Normocephalic  Right Ear: External ear normal    Left Ear: External ear normal    Nose: Nose normal    Mouth/Throat: Oropharynx is clear and moist  No oropharyngeal exudate  Eyes: Pupils are equal, round, and reactive to light  Conjunctivae and EOM are normal  Right eye exhibits no discharge  Left eye exhibits no discharge  Neck: Normal range of motion  Neck supple  No thyromegaly present  Cardiovascular: Normal rate, regular rhythm and intact distal pulses  Exam reveals no gallop and no friction rub  Murmur heard  Pulmonary/Chest: Effort normal and breath sounds normal  No respiratory distress  He has no wheezes  He has no rales  Abdominal: Soft  Bowel sounds are normal  He exhibits no distension and no mass  There is no tenderness  There is no rebound and no guarding  Moderately overweight nontender  Musculoskeletal: Normal range of motion  He exhibits no edema, tenderness or deformity  Lymphadenopathy:     He has no cervical adenopathy  Neurological: He is alert  He has normal reflexes  He displays normal reflexes  No cranial nerve deficit  Coordination normal    Skin: Skin is warm and dry  No rash noted  No erythema  Psychiatric: He has a normal mood and affect  His behavior is normal  Judgment and thought content normal    Nursing note and vitals reviewed          Recent Results (from the past 1008 hour(s))   Vitamin B12    Collection Time: 12/13/19 12:44 PM   Result Value Ref Range Vitamin B-12 1,993 (H) 100 - 900 pg/mL   Folate    Collection Time: 12/13/19 12:44 PM   Result Value Ref Range    Folate >20 0 (H) 3 1 - 17 5 ng/mL   TSH, 3rd generation with Free T4 reflex    Collection Time: 12/13/19 12:44 PM   Result Value Ref Range    TSH 3RD GENERATON 3 874 (H) 0 358 - 3 740 uIU/mL   Lyme Antibody Profile with reflex to WB    Collection Time: 12/13/19 12:44 PM   Result Value Ref Range    Lyme IgG/IgM Ab <0 91 0 00 - 0 90 ISR   RPR    Collection Time: 12/13/19 12:44 PM   Result Value Ref Range    RPR Non-Reactive Non-Reactive   T4, free    Collection Time: 12/13/19 12:44 PM   Result Value Ref Range    Free T4 1 06 0 76 - 1 46 ng/dL

## 2020-01-28 ENCOUNTER — OFFICE VISIT (OUTPATIENT)
Dept: INTERNAL MEDICINE CLINIC | Facility: CLINIC | Age: 85
End: 2020-01-28
Payer: MEDICARE

## 2020-01-28 VITALS
HEIGHT: 70 IN | WEIGHT: 224 LBS | SYSTOLIC BLOOD PRESSURE: 136 MMHG | OXYGEN SATURATION: 96 % | BODY MASS INDEX: 32.07 KG/M2 | HEART RATE: 51 BPM | DIASTOLIC BLOOD PRESSURE: 62 MMHG

## 2020-01-28 DIAGNOSIS — E11.22 TYPE 2 DIABETES MELLITUS WITH STAGE 3 CHRONIC KIDNEY DISEASE, WITHOUT LONG-TERM CURRENT USE OF INSULIN (HCC): ICD-10-CM

## 2020-01-28 DIAGNOSIS — E78.2 MIXED HYPERLIPIDEMIA: Primary | ICD-10-CM

## 2020-01-28 DIAGNOSIS — I25.118 CORONARY ARTERY DISEASE INVOLVING NATIVE CORONARY ARTERY OF NATIVE HEART WITH OTHER FORM OF ANGINA PECTORIS (HCC): ICD-10-CM

## 2020-01-28 DIAGNOSIS — I12.9 HYPERTENSIVE KIDNEY DISEASE WITH STAGE 3 CHRONIC KIDNEY DISEASE (HCC): ICD-10-CM

## 2020-01-28 DIAGNOSIS — I10 ESSENTIAL HYPERTENSION: ICD-10-CM

## 2020-01-28 DIAGNOSIS — N18.30 TYPE 2 DIABETES MELLITUS WITH STAGE 3 CHRONIC KIDNEY DISEASE, WITHOUT LONG-TERM CURRENT USE OF INSULIN (HCC): ICD-10-CM

## 2020-01-28 DIAGNOSIS — Z86.73 HISTORY OF CVA (CEREBROVASCULAR ACCIDENT): ICD-10-CM

## 2020-01-28 DIAGNOSIS — N18.30 HYPERTENSIVE KIDNEY DISEASE WITH STAGE 3 CHRONIC KIDNEY DISEASE (HCC): ICD-10-CM

## 2020-01-28 DIAGNOSIS — R41.3 MEMORY DIFFICULTY: ICD-10-CM

## 2020-01-28 DIAGNOSIS — I35.0 NONRHEUMATIC AORTIC VALVE STENOSIS: ICD-10-CM

## 2020-01-28 LAB — SL AMB POCT HEMOGLOBIN AIC: 7.2 (ref ?–6.5)

## 2020-01-28 PROCEDURE — 99214 OFFICE O/P EST MOD 30 MIN: CPT | Performed by: INTERNAL MEDICINE

## 2020-01-28 PROCEDURE — 83036 HEMOGLOBIN GLYCOSYLATED A1C: CPT | Performed by: INTERNAL MEDICINE

## 2020-01-28 NOTE — PROGRESS NOTES
Assessment/Plan:  Regarding cardiac status no angina but is for catheterization in 48 hours  Renal function remains problematic and is followed closely by nephrology  Blood sugars are variable  Fairly good in the morning higher in the afternoon  He is going to watch his blood sugars for the next 2 months  Emphasized is dietary measures  See him back here in 2 months  1  Mixed hyperlipidemia     2  History of CVA (cerebrovascular accident)     3  Memory difficulty     4  Hypertensive kidney disease with stage 3 chronic kidney disease ([facility] [address] )     5  Nonrheumatic aortic valve stenosis     6  Coronary artery disease involving native coronary artery of native heart with other form of angina pectoris ([facility] [address] )     7  Essential hypertension     8  Type 2 diabetes mellitus with stage 3 chronic kidney disease, without long-term current use of insulin ([facility])         No orders of the defined types were placed in this encounter  Subjective: He brought a list of blood sugars  They are a little bit better in the morning then later in the day  He is not on oral hypoglycemics since his insurance company would not pay for generic  In any case his A1c is 7 2 which is reasonable  At this point I do not think it would benefit from trying to get a lower especially since he is going through a catheterization etc  He has chronic kidney disease is soon to see his nephrologist   He is going to get a cardiac catheterization in the next couple of days  Patient ID: Freedom Grullon is a 82 y o  male      HPI    The following portions of the patient's history were reviewed and updated as appropriate:   He has a past medical history of Acute ST elevation myocardial infarction [facility]), Aortic valve disorder, Benign prostatic hyperplasia with lower urinary tract symptoms, CAD (coronary artery disease), DM2 (diabetes mellitus, type 2) ([facility] ), History of colonoscopy, History of transfusion, History of transient cerebral ischemia, HLD (hyperlipidemia), HTN (hypertension), and Neuralgia ,  does not have any pertinent problems on file  ,   has a past surgical history that includes Total hip arthroplasty (Right); Hip hardware removal; Cardiac catheterization; Coronary angioplasty with stent (2008); Hand surgery; Total hip arthroplasty (Bilateral); and Cardiac catheterization (11/26/2019)  ,  family history includes Emphysema in his father and sister  ,   reports that he has never smoked  He has never used smokeless tobacco  He reports that he does not drink alcohol or use drugs  ,  is allergic to celecoxib; hydromorphone; pravastatin; and statins       Current Outpatient Medications:     amLODIPine (NORVASC) 10 mg tablet, TAKE 1 TABLET BY MOUTH  DAILY, Disp: 90 tablet, Rfl: 0    aspirin (ECOTRIN LOW STRENGTH) 81 mg EC tablet, 1 tab daily, Disp: 30 tablet, Rfl: 4    Blood Glucose Monitoring Suppl (ONE TOUCH ULTRA MINI) w/Device KIT, by Does not apply route, Disp: , Rfl:     cholecalciferol (VITAMIN D3) 1,000 units tablet, Take 1,000 Units by mouth daily, Disp: , Rfl:     clopidogrel (PLAVIX) 75 mg tablet, TAKE 1 TABLET BY MOUTH  DAILY, Disp: 90 tablet, Rfl: 0    Cyanocobalamin ER (RA VITAMIN B-12) 1000 MCG TBCR, Take 2 tablets by mouth daily at bedtime  , Disp: , Rfl:     doxazosin (CARDURA) 8 MG tablet, Take 1 tablet (8 mg total) by mouth daily, Disp: 90 tablet, Rfl: 2    gabapentin (NEURONTIN) 100 mg capsule, Take 1 capsule (100 mg total) by mouth 2 (two) times a day for 90 days, Disp: 180 capsule, Rfl: 3    glucose blood (ONE TOUCH ULTRA TEST) test strip, Test once daily, Disp: 100 each, Rfl: 5    isosorbide dinitrate (ISORDIL) 10 mg tablet, Take 1 tablet (10 mg total) by mouth 2 (two) times a day, Disp: 180 tablet, Rfl: 3    lisinopril (ZESTRIL) 40 mg tablet, Take 1 tablet (40 mg total) by mouth daily, Disp: 90 tablet, Rfl: 3    metoprolol tartrate (LOPRESSOR) 25 mg tablet, Take 1 tablet (25 mg total) by mouth every 12 (twelve) hours, Disp: 180 tablet, Rfl: 3    oxybutynin (DITROPAN XL) 15 MG 24 hr tablet, Take 1 tablet by mouth daily, Disp: , Rfl: 11    pyridoxine (RA VITAMIN B-6) 100 MG tablet, Take 1 tablet by mouth daily, Disp: , Rfl:     Review of Systems   Constitutional: Positive for appetite change and fatigue  Negative for activity change, chills, diaphoresis, fever and unexpected weight change  HENT: Negative for congestion, ear pain, hearing loss, mouth sores, nosebleeds, postnasal drip, sinus pressure, sinus pain, sore throat and trouble swallowing  Eyes: Negative for pain, discharge and visual disturbance  Respiratory: Positive for shortness of breath  Negative for apnea, cough, chest tightness and wheezing  Cardiovascular: Negative for chest pain, palpitations and leg swelling  Gastrointestinal: Negative for abdominal pain, anal bleeding, blood in stool, constipation, diarrhea, nausea and vomiting  Endocrine: Negative for polydipsia and polyphagia  Genitourinary: Negative for decreased urine volume, dysuria, flank pain, frequency, hematuria and urgency  Musculoskeletal: Positive for arthralgias and gait problem  Negative for back pain, joint swelling and myalgias  Skin: Negative for rash and wound  Allergic/Immunologic: Negative for environmental allergies and food allergies  Neurological: Negative for dizziness, tremors, seizures, syncope, speech difficulty, light-headedness, numbness and headaches  Hematological: Negative for adenopathy  Does not bruise/bleed easily  Psychiatric/Behavioral: Negative for agitation, confusion, hallucinations, sleep disturbance and suicidal ideas  The patient is not nervous/anxious  Objective:  /62   Pulse (!) 51   Ht 5' 10" (1 778 m)   Wt 102 kg (224 lb)   SpO2 96%   BMI 32 14 kg/m²      Physical Exam   Constitutional: He appears well-developed  No distress  Moderately overweight  Blood pressure is 136/62     HENT:   Head: Normocephalic  Right Ear: External ear normal    Left Ear: External ear normal    Nose: Nose normal    Mouth/Throat: Oropharynx is clear and moist  No oropharyngeal exudate  Eyes: Pupils are equal, round, and reactive to light  Conjunctivae and EOM are normal  Right eye exhibits no discharge  Left eye exhibits no discharge  Neck: Normal range of motion  Neck supple  No thyromegaly present  Cardiovascular: Normal rate, regular rhythm, normal heart sounds and intact distal pulses  Exam reveals no gallop and no friction rub  No murmur heard  Pulmonary/Chest: Effort normal and breath sounds normal  No respiratory distress  He has no wheezes  He has no rales  Abdominal: Soft  Bowel sounds are normal  He exhibits no distension and no mass  There is no tenderness  There is no rebound and no guarding  Musculoskeletal: Normal range of motion  He exhibits no edema, tenderness or deformity  Lymphadenopathy:     He has no cervical adenopathy  Neurological: He is alert  He has normal reflexes  He displays normal reflexes  No cranial nerve deficit  Coordination normal    Skin: Skin is warm and dry  No rash noted  No erythema  Psychiatric: He has a normal mood and affect  His behavior is normal  Judgment and thought content normal    Nursing note and vitals reviewed          Recent Results (from the past 1008 hour(s))   CBC and differential    Collection Time: 01/21/20  2:31 PM   Result Value Ref Range    WBC 6 46 4 31 - 10 16 Thousand/uL    RBC 4 05 3 88 - 5 62 Million/uL    Hemoglobin 12 7 12 0 - 17 0 g/dL    Hematocrit 39 0 36 5 - 49 3 %    MCV 96 82 - 98 fL    MCH 31 4 26 8 - 34 3 pg    MCHC 32 6 31 4 - 37 4 g/dL    RDW 12 8 11 6 - 15 1 %    MPV 10 6 8 9 - 12 7 fL    Platelets 474 446 - 185 Thousands/uL    nRBC 0 /100 WBCs    Neutrophils Relative 48 43 - 75 %    Immat GRANS % 0 0 - 2 %    Lymphocytes Relative 38 14 - 44 %    Monocytes Relative 7 4 - 12 %    Eosinophils Relative 6 0 - 6 %    Basophils Relative 1 0 - 1 %    Neutrophils Absolute 3 11 1 85 - 7 62 Thousands/µL    Immature Grans Absolute 0 01 0 00 - 0 20 Thousand/uL    Lymphocytes Absolute 2 46 0 60 - 4 47 Thousands/µL    Monocytes Absolute 0 45 0 17 - 1 22 Thousand/µL    Eosinophils Absolute 0 38 0 00 - 0 61 Thousand/µL    Basophils Absolute 0 05 0 00 - 0 10 Thousands/µL   Comprehensive metabolic panel    Collection Time: 01/21/20  2:31 PM   Result Value Ref Range    Sodium 137 136 - 145 mmol/L    Potassium 4 2 3 5 - 5 3 mmol/L    Chloride 104 100 - 108 mmol/L    CO2 28 21 - 32 mmol/L    ANION GAP 5 4 - 13 mmol/L    BUN 41 (H) 5 - 25 mg/dL    Creatinine 1 93 (H) 0 60 - 1 30 mg/dL    Glucose, Fasting 186 (H) 65 - 99 mg/dL    Calcium 9 2 8 3 - 10 1 mg/dL    AST 14 5 - 45 U/L    ALT 18 12 - 78 U/L    Alkaline Phosphatase 60 46 - 116 U/L    Total Protein 7 4 6 4 - 8 2 g/dL    Albumin 3 5 3 5 - 5 0 g/dL    Total Bilirubin 0 30 0 20 - 1 00 mg/dL    eGFR 32 ml/min/1 73sq m

## 2020-01-29 ENCOUNTER — TELEPHONE (OUTPATIENT)
Dept: INPATIENT UNIT | Facility: HOSPITAL | Age: 85
End: 2020-01-29

## 2020-01-29 RX ORDER — ASPIRIN 81 MG/1
324 TABLET, CHEWABLE ORAL ONCE
Status: CANCELLED | OUTPATIENT
Start: 2020-01-29 | End: 2020-01-29

## 2020-01-29 RX ORDER — SODIUM CHLORIDE 9 MG/ML
125 INJECTION, SOLUTION INTRAVENOUS CONTINUOUS
Status: CANCELLED | OUTPATIENT
Start: 2020-01-29 | End: 2020-01-29

## 2020-01-30 ENCOUNTER — HOSPITAL ENCOUNTER (OUTPATIENT)
Dept: NON INVASIVE DIAGNOSTICS | Facility: HOSPITAL | Age: 85
Discharge: HOME | End: 2020-01-31
Attending: INTERNAL MEDICINE | Admitting: INTERNAL MEDICINE
Payer: MEDICARE

## 2020-01-30 DIAGNOSIS — I25.10 CAD S/P PERCUTANEOUS CORONARY ANGIOPLASTY: Primary | ICD-10-CM

## 2020-01-30 DIAGNOSIS — I25.119 CORONARY ARTERY DISEASE INVOLVING NATIVE CORONARY ARTERY OF NATIVE HEART WITH ANGINA PECTORIS (HCC): ICD-10-CM

## 2020-01-30 DIAGNOSIS — Z98.61 CAD S/P PERCUTANEOUS CORONARY ANGIOPLASTY: Primary | ICD-10-CM

## 2020-01-30 LAB
ANION GAP SERPL CALCULATED.3IONS-SCNC: 4 MMOL/L (ref 4–13)
ATRIAL RATE: 39 BPM
ATRIAL RATE: 42 BPM
BUN SERPL-MCNC: 33 MG/DL (ref 5–25)
CALCIUM SERPL-MCNC: 9.3 MG/DL (ref 8.3–10.1)
CHLORIDE SERPL-SCNC: 107 MMOL/L (ref 100–108)
CHOLEST SERPL-MCNC: 207 MG/DL (ref 50–200)
CO2 SERPL-SCNC: 28 MMOL/L (ref 21–32)
CREAT SERPL-MCNC: 1.88 MG/DL (ref 0.6–1.3)
ERYTHROCYTE [DISTWIDTH] IN BLOOD BY AUTOMATED COUNT: 12.8 % (ref 11.6–15.1)
GFR SERPL CREATININE-BSD FRML MDRD: 33 ML/MIN/1.73SQ M
GLUCOSE P FAST SERPL-MCNC: 141 MG/DL (ref 65–99)
GLUCOSE SERPL-MCNC: 122 MG/DL (ref 65–140)
GLUCOSE SERPL-MCNC: 132 MG/DL (ref 65–140)
GLUCOSE SERPL-MCNC: 141 MG/DL (ref 65–140)
HCT VFR BLD AUTO: 40.3 % (ref 36.5–49.3)
HDLC SERPL-MCNC: 50 MG/DL
HGB BLD-MCNC: 13.3 G/DL (ref 12–17)
INR PPP: 1 (ref 0.84–1.19)
KCT BLD-ACNC: 224 SEC (ref 89–137)
LDLC SERPL CALC-MCNC: 135 MG/DL (ref 0–100)
MAGNESIUM SERPL-MCNC: 2.2 MG/DL (ref 1.6–2.6)
MCH RBC QN AUTO: 31.5 PG (ref 26.8–34.3)
MCHC RBC AUTO-ENTMCNC: 33 G/DL (ref 31.4–37.4)
MCV RBC AUTO: 96 FL (ref 82–98)
NONHDLC SERPL-MCNC: 157 MG/DL
P AXIS: 0 DEGREES
P AXIS: 75 DEGREES
PLATELET # BLD AUTO: 176 THOUSANDS/UL (ref 149–390)
PMV BLD AUTO: 10.1 FL (ref 8.9–12.7)
POTASSIUM SERPL-SCNC: 4.3 MMOL/L (ref 3.5–5.3)
PR INTERVAL: 238 MS
PR INTERVAL: 276 MS
PROTHROMBIN TIME: 12.8 SECONDS (ref 11.6–14.5)
QRS AXIS: 47 DEGREES
QRS AXIS: 59 DEGREES
QRSD INTERVAL: 152 MS
QRSD INTERVAL: 154 MS
QT INTERVAL: 536 MS
QT INTERVAL: 540 MS
QTC INTERVAL: 431 MS
QTC INTERVAL: 450 MS
RBC # BLD AUTO: 4.22 MILLION/UL (ref 3.88–5.62)
SODIUM SERPL-SCNC: 139 MMOL/L (ref 136–145)
SPECIMEN SOURCE: ABNORMAL
T WAVE AXIS: 14 DEGREES
T WAVE AXIS: 88 DEGREES
TRIGL SERPL-MCNC: 109 MG/DL
VENTRICULAR RATE: 39 BPM
VENTRICULAR RATE: 42 BPM
WBC # BLD AUTO: 5.94 THOUSAND/UL (ref 4.31–10.16)

## 2020-01-30 PROCEDURE — 99152 MOD SED SAME PHYS/QHP 5/>YRS: CPT | Performed by: INTERNAL MEDICINE

## 2020-01-30 PROCEDURE — C1894 INTRO/SHEATH, NON-LASER: HCPCS | Performed by: INTERNAL MEDICINE

## 2020-01-30 PROCEDURE — 85027 COMPLETE CBC AUTOMATED: CPT | Performed by: INTERNAL MEDICINE

## 2020-01-30 PROCEDURE — 82948 REAGENT STRIP/BLOOD GLUCOSE: CPT

## 2020-01-30 PROCEDURE — 99153 MOD SED SAME PHYS/QHP EA: CPT | Performed by: INTERNAL MEDICINE

## 2020-01-30 PROCEDURE — C1725 CATH, TRANSLUMIN NON-LASER: HCPCS | Performed by: INTERNAL MEDICINE

## 2020-01-30 PROCEDURE — C9600 PERC DRUG-EL COR STENT SING: HCPCS | Performed by: INTERNAL MEDICINE

## 2020-01-30 PROCEDURE — C1887 CATHETER, GUIDING: HCPCS | Performed by: INTERNAL MEDICINE

## 2020-01-30 PROCEDURE — 93454 CORONARY ARTERY ANGIO S&I: CPT | Performed by: INTERNAL MEDICINE

## 2020-01-30 PROCEDURE — C1874 STENT, COATED/COV W/DEL SYS: HCPCS

## 2020-01-30 PROCEDURE — 80048 BASIC METABOLIC PNL TOTAL CA: CPT | Performed by: INTERNAL MEDICINE

## 2020-01-30 PROCEDURE — 92928 PRQ TCAT PLMT NTRAC ST 1 LES: CPT | Performed by: INTERNAL MEDICINE

## 2020-01-30 PROCEDURE — C1769 GUIDE WIRE: HCPCS | Performed by: INTERNAL MEDICINE

## 2020-01-30 PROCEDURE — 93005 ELECTROCARDIOGRAM TRACING: CPT

## 2020-01-30 PROCEDURE — 93010 ELECTROCARDIOGRAM REPORT: CPT | Performed by: INTERNAL MEDICINE

## 2020-01-30 PROCEDURE — 83735 ASSAY OF MAGNESIUM: CPT | Performed by: INTERNAL MEDICINE

## 2020-01-30 PROCEDURE — 85347 COAGULATION TIME ACTIVATED: CPT

## 2020-01-30 PROCEDURE — 85610 PROTHROMBIN TIME: CPT | Performed by: INTERNAL MEDICINE

## 2020-01-30 PROCEDURE — 80061 LIPID PANEL: CPT | Performed by: INTERNAL MEDICINE

## 2020-01-30 RX ORDER — ASPIRIN 81 MG/1
324 TABLET, CHEWABLE ORAL ONCE
Status: COMPLETED | OUTPATIENT
Start: 2020-01-30 | End: 2020-01-30

## 2020-01-30 RX ORDER — CLOPIDOGREL BISULFATE 75 MG/1
75 TABLET ORAL DAILY
Status: DISCONTINUED | OUTPATIENT
Start: 2020-01-31 | End: 2020-01-31 | Stop reason: HOSPADM

## 2020-01-30 RX ORDER — SODIUM CHLORIDE 9 MG/ML
100 INJECTION, SOLUTION INTRAVENOUS CONTINUOUS
Status: DISPENSED | OUTPATIENT
Start: 2020-01-30 | End: 2020-01-30

## 2020-01-30 RX ORDER — MIDAZOLAM HYDROCHLORIDE 2 MG/2ML
INJECTION, SOLUTION INTRAMUSCULAR; INTRAVENOUS CODE/TRAUMA/SEDATION MEDICATION
Status: COMPLETED | OUTPATIENT
Start: 2020-01-30 | End: 2020-01-30

## 2020-01-30 RX ORDER — VERAPAMIL HYDROCHLORIDE 2.5 MG/ML
INJECTION, SOLUTION INTRAVENOUS CODE/TRAUMA/SEDATION MEDICATION
Status: COMPLETED | OUTPATIENT
Start: 2020-01-30 | End: 2020-01-30

## 2020-01-30 RX ORDER — FENTANYL CITRATE 50 UG/ML
INJECTION, SOLUTION INTRAMUSCULAR; INTRAVENOUS CODE/TRAUMA/SEDATION MEDICATION
Status: COMPLETED | OUTPATIENT
Start: 2020-01-30 | End: 2020-01-30

## 2020-01-30 RX ORDER — LIDOCAINE HYDROCHLORIDE 10 MG/ML
INJECTION, SOLUTION EPIDURAL; INFILTRATION; INTRACAUDAL; PERINEURAL CODE/TRAUMA/SEDATION MEDICATION
Status: COMPLETED | OUTPATIENT
Start: 2020-01-30 | End: 2020-01-30

## 2020-01-30 RX ORDER — NITROGLYCERIN 20 MG/100ML
INJECTION INTRAVENOUS CODE/TRAUMA/SEDATION MEDICATION
Status: COMPLETED | OUTPATIENT
Start: 2020-01-30 | End: 2020-01-30

## 2020-01-30 RX ORDER — NITROGLYCERIN 20 MG/100ML
INJECTION INTRAVENOUS
Status: COMPLETED | OUTPATIENT
Start: 2020-01-30 | End: 2020-01-30

## 2020-01-30 RX ORDER — SODIUM CHLORIDE 9 MG/ML
125 INJECTION, SOLUTION INTRAVENOUS CONTINUOUS
Status: DISPENSED | OUTPATIENT
Start: 2020-01-30 | End: 2020-01-30

## 2020-01-30 RX ORDER — MULTIVITAMIN WITH IRON
100 TABLET ORAL DAILY
Status: DISCONTINUED | OUTPATIENT
Start: 2020-01-30 | End: 2020-01-31 | Stop reason: HOSPADM

## 2020-01-30 RX ORDER — DOXAZOSIN MESYLATE 4 MG/1
8 TABLET ORAL DAILY
Status: DISCONTINUED | OUTPATIENT
Start: 2020-01-30 | End: 2020-01-31 | Stop reason: HOSPADM

## 2020-01-30 RX ORDER — HEPARIN SODIUM 1000 [USP'U]/ML
INJECTION, SOLUTION INTRAVENOUS; SUBCUTANEOUS CODE/TRAUMA/SEDATION MEDICATION
Status: COMPLETED | OUTPATIENT
Start: 2020-01-30 | End: 2020-01-30

## 2020-01-30 RX ORDER — ISOSORBIDE DINITRATE 10 MG/1
10 TABLET ORAL 2 TIMES DAILY
Status: DISCONTINUED | OUTPATIENT
Start: 2020-01-30 | End: 2020-01-31 | Stop reason: HOSPADM

## 2020-01-30 RX ORDER — ONDANSETRON 2 MG/ML
4 INJECTION INTRAMUSCULAR; INTRAVENOUS EVERY 6 HOURS PRN
Status: DISCONTINUED | OUTPATIENT
Start: 2020-01-30 | End: 2020-01-31 | Stop reason: HOSPADM

## 2020-01-30 RX ORDER — ASPIRIN 81 MG/1
TABLET, CHEWABLE ORAL
Status: COMPLETED
Start: 2020-01-30 | End: 2020-01-30

## 2020-01-30 RX ORDER — ASPIRIN 81 MG/1
81 TABLET ORAL DAILY
Status: DISCONTINUED | OUTPATIENT
Start: 2020-01-31 | End: 2020-01-31 | Stop reason: HOSPADM

## 2020-01-30 RX ORDER — AMLODIPINE BESYLATE 10 MG/1
10 TABLET ORAL DAILY
Status: DISCONTINUED | OUTPATIENT
Start: 2020-01-30 | End: 2020-01-31 | Stop reason: HOSPADM

## 2020-01-30 RX ORDER — GABAPENTIN 100 MG/1
100 CAPSULE ORAL 2 TIMES DAILY
Status: DISCONTINUED | OUTPATIENT
Start: 2020-01-30 | End: 2020-01-31 | Stop reason: HOSPADM

## 2020-01-30 RX ADMIN — FENTANYL CITRATE 25 MCG: 50 INJECTION, SOLUTION INTRAMUSCULAR; INTRAVENOUS at 14:50

## 2020-01-30 RX ADMIN — ISOSORBIDE DINITRATE 10 MG: 10 TABLET ORAL at 18:39

## 2020-01-30 RX ADMIN — ASPIRIN 81 MG: 81 TABLET ORAL at 07:00

## 2020-01-30 RX ADMIN — OXYBUTYNIN 15 MG: 5 TABLET, FILM COATED, EXTENDED RELEASE ORAL at 18:40

## 2020-01-30 RX ADMIN — NITROGLYCERIN 10 MCG: 20 INJECTION INTRAVENOUS at 15:41

## 2020-01-30 RX ADMIN — Medication 200 MCG: at 14:56

## 2020-01-30 RX ADMIN — HEPARIN SODIUM 3000 UNITS: 1000 INJECTION INTRAVENOUS; SUBCUTANEOUS at 15:25

## 2020-01-30 RX ADMIN — Medication 100 MG: at 18:41

## 2020-01-30 RX ADMIN — VERAPAMIL HYDROCHLORIDE 2.5 MG: 2.5 INJECTION INTRAVENOUS at 14:56

## 2020-01-30 RX ADMIN — AMLODIPINE BESYLATE 10 MG: 10 TABLET ORAL at 17:06

## 2020-01-30 RX ADMIN — SODIUM CHLORIDE 125 ML/HR: 0.9 INJECTION, SOLUTION INTRAVENOUS at 07:17

## 2020-01-30 RX ADMIN — HEPARIN SODIUM 4000 UNITS: 1000 INJECTION INTRAVENOUS; SUBCUTANEOUS at 14:56

## 2020-01-30 RX ADMIN — DOXAZOSIN 8 MG: 4 TABLET ORAL at 18:40

## 2020-01-30 RX ADMIN — IOHEXOL 130 ML: 350 INJECTION, SOLUTION INTRAVENOUS at 15:32

## 2020-01-30 RX ADMIN — LIDOCAINE HYDROCHLORIDE 1 ML: 10 INJECTION, SOLUTION EPIDURAL; INFILTRATION; INTRACAUDAL; PERINEURAL at 14:55

## 2020-01-30 RX ADMIN — MIDAZOLAM 1 MG: 1 INJECTION INTRAMUSCULAR; INTRAVENOUS at 14:47

## 2020-01-30 RX ADMIN — FENTANYL CITRATE 25 MCG: 50 INJECTION, SOLUTION INTRAMUSCULAR; INTRAVENOUS at 14:47

## 2020-01-30 RX ADMIN — MIDAZOLAM 1 MG: 1 INJECTION INTRAMUSCULAR; INTRAVENOUS at 14:51

## 2020-01-30 RX ADMIN — ASPIRIN 81 MG 324 MG: 81 TABLET ORAL at 07:18

## 2020-01-30 RX ADMIN — GABAPENTIN 100 MG: 100 CAPSULE ORAL at 17:06

## 2020-01-30 NOTE — INTERVAL H&P NOTE
Update: (This section must be completed if the H&P was completed greater than 24 hrs to procedure or admission)    H&P reviewed  After examining the patient, I find no changed to the H&P since it had been written  /72 (BP Location: Right arm)   Pulse (!) 46   Temp 97 7 °F (36 5 °C) (Oral)   Resp 18   Ht 5' 10" (1 778 m)   Wt 102 kg (224 lb)   SpO2 96%   BMI 32 14 kg/m²    Discussed probable rotational atherectomy higher risk PCI as well, includes vessel closure, perforation, heart attack, death, need for surgical repair    Patient re-evaluated   Accept as history and physical     Brock Lawson, DO/January 30, 2020/8:25 AM

## 2020-01-30 NOTE — DISCHARGE INSTRUCTIONS
1  Please see the post angioplasty discharge instructions  No heavy lifting, greater than 10 lbs  or strenuous activity for 1 week  Follow angioplasty discharge instructions  2 Remove band aid tomorrow  Shower and wash area- wrist gently with soap and water- beginning tomorrow  Rinse and pat dry  Apply new water seal band aid  Repeat this process for 5 days  No powders, creams lotions or antibiotic ointments  for 5 days  No tub baths, hot tubs or swimming for 5 days  3  Call [facility] ([phone]) if you develop a fever, redness or drainage at your wrist access site  4  No driving for 2 days    5  Do not stop aspirin or Plavix (clopiogrel) any reason without a cardiologists consent, or the stent could block up and cause a heart attack  6  Stent card and book  Coronary Intravascular Stent Placement   WHAT YOU SHOULD KNOW:   Coronary intravascular stent placement is a procedure to place a stent in an artery of your heart that has plaque buildup  Plaque is a mixture of fat and cholesterol  A stent is a small mesh tube made of metal that helps keep your artery open  Your caregiver may place a bare metal stent or a drug-eluting stent (CONCEPCION) in your artery  A CONCEPCION is coated with medicine that is slowly released and helps prevent more plaque buildup in the area where the stent is placed  The stent remains in your artery for life  You may need more than one stent  AFTER YOU LEAVE:   Medicines: You will be given any of the following:  · Antiplatelets  prevent blood clots from forming  You will need to take aspirin and another type of platelet medicine  Take this medicine daily as directed  Do not stop taking aspirin or other type of antiplatelet medicine  · Nitrates , such as nitroglycerin, relax the arteries of your heart so it gets more oxygen  This medicine helps to relieve chest pain      · Cholesterol medicine  helps decrease the amount of cholesterol in your blood     · Blood pressure medicine  lowers your blood pressure  · Take your medicine as directed  Call your healthcare provider if you think your medicine is not helping or if you have side effects  Tell him if you are allergic to any medicine  Keep a list of the medicines, vitamins, and herbs you take  Include the amounts, and when and why you take them  Bring the list or the pill bottles to follow-up visits  Carry your medicine list with you in case of an emergency  Follow up with your cardiologist as directed:  Write down your questions so you remember to ask them during your visits  Activity:   · Avoid unnecessary stair climbing for 48 hours, if a catheter was put in your groin  · Do not place pressure on your arm, hand, or wrist, if the catheter was placed in your wrist  Avoid pushing, pulling, or heavy lifting with that arm  · If you need to cough, support the area where the catheter was inserted with your hand  · Ask your cardiologist how long you need to limit movement and avoid certain activities  · You may feel like resting more after your procedure  Slowly start to do more each day  Rest when you feel it is needed  Wound care:  Ask your cardiologist about how to care for your incision wound  Ask when you can get into a tub, shower, or pool  Do not smoke: If you smoke, it is never too late to quit  Smoking increases your risk for heart disease and stroke  Ask your cardiologist for information if you need help quitting  Cardiac rehab:  Your cardiologist may recommend that you attend cardiac rehabilitation (rehab)  This is a program run by specialists who will help you safely strengthen your heart and reduce the risk of more heart disease  The plan includes exercise, relaxation, stress management, and heart-healthy nutrition  Caregivers will also check to make sure any medicines you are taking are working  Contact your cardiologist if:   · You have a fever or chills       · You have questions or concerns about your condition or care  Seek care immediately or call 911 if:   · Your leg or arm, used for the procedure, becomes numb or turns white or blue  · The area where the catheter was placed is swollen, red, or has pus or foul-smelling fluid coming from it  · Your arm or leg feels warm, tender, and painful  It may look swollen and red  · You start to bleed from your catheter site again  · You have any of the following signs of a heart attack:     ¨ Squeezing, pressure, fullness, or pain in your chest that lasts longer than a few minutes or returns     ¨ Discomfort or pain in your back, neck, jaw, stomach, or arm    ¨ Shortness of breath or breathing problems    ¨ A sudden cold sweat, lightheadedness, dizziness, or nausea, especially with chest pain or trouble breathing    · You have any of the following signs of a stroke:     ¨ Part of your face droops or is numb    ¨ Weakness in an arm or leg    ¨ Confusion or difficulty speaking    ¨ Dizziness, a severe headache, or vision loss  © 2014 [employer] is for End User's use only and may not be sold, redistributed or otherwise used for commercial purposes  All illustrations and images included in CareNotes® are the copyrighted property of [employer]  or [employer]  The above information is an  only  It is not intended as medical advice for individual conditions or treatments  Talk to your doctor, nurse or pharmacist before following any medical regimen to see if it is safe and effective for you

## 2020-01-31 VITALS
OXYGEN SATURATION: 94 % | RESPIRATION RATE: 18 BRPM | TEMPERATURE: 98.3 F | BODY MASS INDEX: 32.07 KG/M2 | WEIGHT: 224 LBS | HEART RATE: 54 BPM | SYSTOLIC BLOOD PRESSURE: 160 MMHG | DIASTOLIC BLOOD PRESSURE: 71 MMHG | HEIGHT: 70 IN

## 2020-01-31 LAB
ANION GAP SERPL CALCULATED.3IONS-SCNC: 5 MMOL/L (ref 4–13)
BUN SERPL-MCNC: 25 MG/DL (ref 5–25)
CALCIUM SERPL-MCNC: 8.9 MG/DL (ref 8.3–10.1)
CHLORIDE SERPL-SCNC: 109 MMOL/L (ref 100–108)
CO2 SERPL-SCNC: 24 MMOL/L (ref 21–32)
CREAT SERPL-MCNC: 1.42 MG/DL (ref 0.6–1.3)
ERYTHROCYTE [DISTWIDTH] IN BLOOD BY AUTOMATED COUNT: 12.6 % (ref 11.6–15.1)
GFR SERPL CREATININE-BSD FRML MDRD: 46 ML/MIN/1.73SQ M
GLUCOSE SERPL-MCNC: 113 MG/DL (ref 65–140)
GLUCOSE SERPL-MCNC: 130 MG/DL (ref 65–140)
HCT VFR BLD AUTO: 38 % (ref 36.5–49.3)
HGB BLD-MCNC: 12.3 G/DL (ref 12–17)
MCH RBC QN AUTO: 30.8 PG (ref 26.8–34.3)
MCHC RBC AUTO-ENTMCNC: 32.4 G/DL (ref 31.4–37.4)
MCV RBC AUTO: 95 FL (ref 82–98)
PLATELET # BLD AUTO: 147 THOUSANDS/UL (ref 149–390)
PMV BLD AUTO: 10 FL (ref 8.9–12.7)
POTASSIUM SERPL-SCNC: 4 MMOL/L (ref 3.5–5.3)
RBC # BLD AUTO: 4 MILLION/UL (ref 3.88–5.62)
SODIUM SERPL-SCNC: 138 MMOL/L (ref 136–145)
WBC # BLD AUTO: 5.58 THOUSAND/UL (ref 4.31–10.16)

## 2020-01-31 PROCEDURE — 82948 REAGENT STRIP/BLOOD GLUCOSE: CPT

## 2020-01-31 PROCEDURE — NC001 PR NO CHARGE: Performed by: INTERNAL MEDICINE

## 2020-01-31 PROCEDURE — 80048 BASIC METABOLIC PNL TOTAL CA: CPT | Performed by: STUDENT IN AN ORGANIZED HEALTH CARE EDUCATION/TRAINING PROGRAM

## 2020-01-31 PROCEDURE — 85027 COMPLETE CBC AUTOMATED: CPT | Performed by: STUDENT IN AN ORGANIZED HEALTH CARE EDUCATION/TRAINING PROGRAM

## 2020-01-31 RX ORDER — CLOPIDOGREL BISULFATE 75 MG/1
75 TABLET ORAL DAILY
Qty: 30 TABLET | Refills: 11 | Status: CANCELLED | OUTPATIENT
Start: 2020-02-01

## 2020-01-31 RX ADMIN — ASPIRIN 81 MG: 81 TABLET ORAL at 08:39

## 2020-01-31 RX ADMIN — OXYBUTYNIN 15 MG: 5 TABLET, FILM COATED, EXTENDED RELEASE ORAL at 08:39

## 2020-01-31 RX ADMIN — GABAPENTIN 100 MG: 100 CAPSULE ORAL at 08:39

## 2020-01-31 RX ADMIN — CLOPIDOGREL BISULFATE 75 MG: 75 TABLET ORAL at 08:39

## 2020-01-31 RX ADMIN — DOXAZOSIN 8 MG: 4 TABLET ORAL at 10:24

## 2020-01-31 RX ADMIN — ISOSORBIDE DINITRATE 10 MG: 10 TABLET ORAL at 08:39

## 2020-01-31 RX ADMIN — Medication 100 MG: at 10:24

## 2020-01-31 RX ADMIN — METOPROLOL TARTRATE 25 MG: 25 TABLET ORAL at 08:39

## 2020-01-31 RX ADMIN — AMLODIPINE BESYLATE 10 MG: 10 TABLET ORAL at 08:39

## 2020-01-31 NOTE — DISCHARGE SUMMARY
Discharge Summary - Terese Arriaga 82 y o  male MRN: [id number]    Unit/Bed#: CW2 215-01 Encounter: [id number]    Admission Date: 1/30/2020   Discharge Date:     Disposition: Eric Littlejohn DO      OP Cardiologist:  Dr Marciano Coley  Interventional cardiologist:  Dr Megan Louie    Admitting Diagnosis:  Dyspnea on exertion class 3    Discharge Diagnosis:  CAD status post PCI and drug-eluting stent to mid and distal LAD  Secondary Diagnoses: And PCI to RCA/PDA in 2016  Chronic kidney disease stage 3  History of CVA with prior tPA      Condition at Discharge: good           /71   Pulse (!) 54   Temp 98 3 °F (36 8 °C)   Resp 18   Ht 5' 10" (1 778 m)   Wt 102 kg (224 lb)   SpO2 94%   BMI 32 14 kg/m²       Review of Systems   All other systems reviewed and are negative  Physical Exam   Constitutional: He is oriented to person, place, and time  He appears well-developed and well-nourished  HENT:   Head: Atraumatic  Neck: Neck supple  Cardiovascular: Normal rate, normal heart sounds and intact distal pulses  Pulmonary/Chest: Effort normal and breath sounds normal    Abdominal: Soft  Bowel sounds are normal    Musculoskeletal: He exhibits no edema  Neurological: He is alert and oriented to person, place, and time  Skin: Skin is warm and dry  Right Radial artery has  + 2 pulse with brisk capillary refill  Neurovascular intact to  Right hand  HPI and Hospital Course:  53-year-old male with history of coronary disease status post CABG and PCI and stenting in the past   Patient has follow-up with Dr Marciano Coley on [address] he has been complaining of shortness of breath with exertion  Patient was electively admitted for cardiac catheterization  Results the catheters follows: The coronary circulation is left dominant  --  Left main: Angiography showed minor luminal irregularities  --  Mid LAD: There was a 90 % stenosis at the site of a prior stent    --  Distal LAD: There was a 90 % stenosis at the site of a prior stent  --  Left posterior descending artery: There was a diffuse 50 % stenosis  --  RCA: Angiography showed minor luminal irregularities  Patient underwent PCI and drug-eluting stents to mid and distal LAD  Patient required DAPT (aspirin 81 mg daily and Plavix 75 mg daily) x1 year  Patient can follow up with Dr Karissa Bunn in the [facility] office  Admitting GFR was 33 and creatinine was 1 88  Discharge creatinine is 1 4 and GFR is 46  BMP is ordered for Monday to evaluate his creatinine and GFR  Patient has been significantly bradycardic during the hospitalization and the procedure with his heart rate in the low 40s and in the mid 30s during the night  I have decreased his metoprolol our tartrate to 12 5 mg q 12 hours  This medication can be re-evaluated at the office visit next week  Patient is allergic to statins  Will defer to Dr Karissa Bunn for his recommendations for class other cholesterol reducing medications which can be discussed at the office visit      Current Facility-Administered Medications   Medication Dose Route Frequency    amLODIPine (NORVASC) tablet 10 mg  10 mg Oral Daily    aspirin (ECOTRIN LOW STRENGTH) EC tablet 81 mg  81 mg Oral Daily    clopidogrel (PLAVIX) tablet 75 mg  75 mg Oral Daily    doxazosin (CARDURA) tablet 8 mg  8 mg Oral Daily    gabapentin (NEURONTIN) capsule 100 mg  100 mg Oral BID    insulin lispro (HumaLOG) 100 units/mL subcutaneous injection 1-6 Units  1-6 Units Subcutaneous TID AC    isosorbide dinitrate (ISORDIL) tablet 10 mg  10 mg Oral BID    metoprolol tartrate (LOPRESSOR) tablet 25 mg  25 mg Oral Q12H [facility]    ondansetron (ZOFRAN) injection 4 mg  4 mg Intravenous Q6H PRN    oxybutynin (DITROPAN-XL) 24 hr tablet 15 mg  15 mg Oral Daily    pyridoxine (VITAMIN B6) tablet 100 mg  100 mg Oral Daily       Pertinent Labs/diagnostics:        CBC with diff:   Results from last 7 days   Lab Units 01/31/20  0531 01/30/20  0718   WBC Thousand/uL 5 58 5 94   HEMOGLOBIN g/dL 12 3 13 3   HEMATOCRIT % 38 0 40 3   MCV fL 95 96   PLATELETS Thousands/uL 147* 176   MCH pg 30 8 31 5   MCHC g/dL 32 4 33 0   RDW % 12 6 12 8   MPV fL 10 0 10 1         CMP:  Results from last 7 days   Lab Units 01/31/20  0531 01/30/20  0718   POTASSIUM mmol/L 4 0 4 3   CHLORIDE mmol/L 109* 107   CO2 mmol/L 24 28   BUN mg/dL 25 33*   CREATININE mg/dL 1 42* 1 88*   CALCIUM mg/dL 8 9 9 3   EGFR ml/min/1 73sq m 46 33       Lipid Profile:   Lab Results   Component Value Date    CHOL 190 12/01/2015    CHOL 210 07/14/2015    CHOL 198 09/11/2014     Lab Results   Component Value Date    HDL 50 01/30/2020    HDL 47 09/09/2019    HDL 52 11/26/2018     Lab Results   Component Value Date    LDLCALC 135 (H) 01/30/2020    LDLCALC 113 (H) 09/09/2019    LDLCALC 119 (H) 11/26/2018     Lab Results   Component Value Date    TRIG 109 01/30/2020    TRIG 77 09/09/2019    TRIG 71 11/26/2018         Tele: sinus bradycardia      Discharge instructions/Information to patient and family:   See after visit summary for information provided to patient and family  Provisions for Follow-Up Care:  See after visit summary for information related to follow-up care and any pertinent home health orders  Planned Readmission: No    Discharge Statement:  I spent 45 minutes minutes discharging the patient  This time was spent on the day of discharge  I had direct contact with the patient on the day of discharge  Additional documentation is required if more than 30 minutes were spent on discharge  ** Please Note: Fluency Direct Dictation voice to text software may have been used in the creation of this document   **

## 2020-02-03 ENCOUNTER — APPOINTMENT (OUTPATIENT)
Dept: LAB | Facility: HOSPITAL | Age: 85
End: 2020-02-03
Payer: MEDICARE

## 2020-02-03 DIAGNOSIS — I25.10 CAD S/P PERCUTANEOUS CORONARY ANGIOPLASTY: ICD-10-CM

## 2020-02-03 DIAGNOSIS — Z98.61 CAD S/P PERCUTANEOUS CORONARY ANGIOPLASTY: ICD-10-CM

## 2020-02-03 LAB
ANION GAP SERPL CALCULATED.3IONS-SCNC: 6 MMOL/L (ref 4–13)
BUN SERPL-MCNC: 28 MG/DL (ref 5–25)
CALCIUM SERPL-MCNC: 9 MG/DL (ref 8.3–10.1)
CHLORIDE SERPL-SCNC: 105 MMOL/L (ref 100–108)
CO2 SERPL-SCNC: 27 MMOL/L (ref 21–32)
CREAT SERPL-MCNC: 1.79 MG/DL (ref 0.6–1.3)
GFR SERPL CREATININE-BSD FRML MDRD: 35 ML/MIN/1.73SQ M
GLUCOSE P FAST SERPL-MCNC: 150 MG/DL (ref 65–99)
POTASSIUM SERPL-SCNC: 4.4 MMOL/L (ref 3.5–5.3)
SODIUM SERPL-SCNC: 138 MMOL/L (ref 136–145)

## 2020-02-03 PROCEDURE — 80048 BASIC METABOLIC PNL TOTAL CA: CPT

## 2020-02-03 PROCEDURE — 36415 COLL VENOUS BLD VENIPUNCTURE: CPT

## 2020-02-04 ENCOUNTER — TELEPHONE (OUTPATIENT)
Dept: NEPHROLOGY | Facility: CLINIC | Age: 85
End: 2020-02-04

## 2020-02-04 NOTE — TELEPHONE ENCOUNTER
Called and left a message on machine for patient to confirming appointment for Wednesday 2/5/2020 3 month follow up with Dr Antonia Ferrer  I also remind the patient that he needs to have blood work done   Frankie Velasquez,

## 2020-02-05 ENCOUNTER — OFFICE VISIT (OUTPATIENT)
Dept: NEPHROLOGY | Facility: CLINIC | Age: 85
End: 2020-02-05
Payer: MEDICARE

## 2020-02-05 VITALS
DIASTOLIC BLOOD PRESSURE: 76 MMHG | HEIGHT: 69 IN | SYSTOLIC BLOOD PRESSURE: 172 MMHG | TEMPERATURE: 97.3 F | RESPIRATION RATE: 16 BRPM | WEIGHT: 225.4 LBS | BODY MASS INDEX: 33.38 KG/M2 | HEART RATE: 62 BPM

## 2020-02-05 DIAGNOSIS — E55.9 VITAMIN D DEFICIENCY: ICD-10-CM

## 2020-02-05 DIAGNOSIS — Z98.61 CAD S/P PERCUTANEOUS CORONARY ANGIOPLASTY: ICD-10-CM

## 2020-02-05 DIAGNOSIS — N18.30 HYPERTENSIVE KIDNEY DISEASE WITH STAGE 3 CHRONIC KIDNEY DISEASE (HCC): ICD-10-CM

## 2020-02-05 DIAGNOSIS — I12.9 HYPERTENSIVE KIDNEY DISEASE WITH STAGE 3 CHRONIC KIDNEY DISEASE (HCC): ICD-10-CM

## 2020-02-05 DIAGNOSIS — I25.10 CAD S/P PERCUTANEOUS CORONARY ANGIOPLASTY: ICD-10-CM

## 2020-02-05 DIAGNOSIS — N18.30 STAGE 3 CHRONIC KIDNEY DISEASE (HCC): Primary | ICD-10-CM

## 2020-02-05 DIAGNOSIS — R80.8 OTHER PROTEINURIA: ICD-10-CM

## 2020-02-05 PROCEDURE — 4040F PNEUMOC VAC/ADMIN/RCVD: CPT | Performed by: INTERNAL MEDICINE

## 2020-02-05 PROCEDURE — 1036F TOBACCO NON-USER: CPT | Performed by: INTERNAL MEDICINE

## 2020-02-05 PROCEDURE — 3077F SYST BP >= 140 MM HG: CPT | Performed by: INTERNAL MEDICINE

## 2020-02-05 PROCEDURE — 99214 OFFICE O/P EST MOD 30 MIN: CPT | Performed by: INTERNAL MEDICINE

## 2020-02-05 PROCEDURE — 3008F BODY MASS INDEX DOCD: CPT | Performed by: INTERNAL MEDICINE

## 2020-02-05 PROCEDURE — 3078F DIAST BP <80 MM HG: CPT | Performed by: INTERNAL MEDICINE

## 2020-02-05 PROCEDURE — 1160F RVW MEDS BY RX/DR IN RCRD: CPT | Performed by: INTERNAL MEDICINE

## 2020-02-05 PROCEDURE — 2022F DILAT RTA XM EVC RTNOPTHY: CPT | Performed by: INTERNAL MEDICINE

## 2020-02-05 PROCEDURE — 3066F NEPHROPATHY DOC TX: CPT | Performed by: INTERNAL MEDICINE

## 2020-02-05 RX ORDER — OMEGA-3-ACID ETHYL ESTERS 1 G/1
1200 CAPSULE, LIQUID FILLED ORAL DAILY
COMMUNITY
End: 2021-07-20 | Stop reason: HOSPADM

## 2020-02-05 RX ORDER — UREA 10 %
1000 LOTION (ML) TOPICAL DAILY
COMMUNITY

## 2020-02-05 RX ORDER — ASCORBIC ACID, TOCOPHERYL ACID SUCCINATE, THIAMINE, RIBOFLAVIN, NIACINAMIDE, PYRIDOXINE, FOLIC ACID, COBALAMIN, BIOTIN, PANTOTHENIC ACID, ZINC, SELENIUM 100; 1.5; 1.7; 20; 25; 3; 1; 300; 10; 15; 30; 7 MG/1; MG/1; MG/1; MG/1; MG/1; MG/1; MG/1; UG/1; MG/1; MG/1; [IU]/1; UG/1
1 TABLET, COATED ORAL DAILY
COMMUNITY
End: 2020-03-03

## 2020-02-05 NOTE — LETTER
February 5, 2020     Jazlyn Phillip DO  [address] [address]    Patient: Jensen Huang   YOB: 1935   Date of Visit: 2/5/2020       Dear Dr Hilda Shell: Thank you for referring Franklyn Nelson to me for evaluation  Below are my notes for this consultation  If you have questions, please do not hesitate to call me  I look forward to following your patient along with you  Sincerely,        Heladio Flowers MD        CC: No Recipients  Heladio Flowers MD  2/5/2020  1:38 PM  Sign at close encounter  Lashell Rahman 82 y o  male Date of Birth: @ MRN: [id number]    Encounter: [id number] DATE: 2/5/2020    REASON FOR VISIT: Jensen Huang is a 82 y o  male returns to Nephrology office on 2/5/2020 for further management of chronic kidney disease  HPI:    This is a 82-year-old male with a past medical history of diabetes type 2, hypertension, coronary artery disease, returns to Nephrology office for further management of CKD stage 3  Patient's daughter-Barrie was also present at the time of consultation and history was also obtained from her and all the questions were answered  Upon review of old medical records patient's new baseline creatinine seems to be fluctuating around 1 6-1 8  In the interim patient had underwent cardiac catheterization on 1/30/2020 and had mid and distal LAD stent placed  Patient has hypertension and has been compliant with his antihypertensive medication but blood pressure seems to be elevated and does not seem to be under good control at this point  Patient has underlying diabetes type 2 which is also under well control with the use of current medications  Patient was also previously found to have proteinuria which was suspected due to underlying diabetic nephropathy on top of hypertensive nephrosclerosis  Patient takes lisinopril      Patient was also found to have vitamin-D deficiency and currently taking cholecalciferol 1000 Units PO daily  Patient takes all the medications as prescribed and denies use of NSAIDs  REVIEW OF SYSTEMS:    Review of Systems   Constitutional: Negative for chills and fever  HENT: Negative for nosebleeds and sore throat  Eyes: Negative for photophobia and pain  Respiratory: Negative for choking and wheezing  Cardiovascular: Negative for chest pain and palpitations  Gastrointestinal: Negative for abdominal pain and blood in stool  Endocrine: Negative for cold intolerance and heat intolerance  Genitourinary: Negative for flank pain and hematuria  Musculoskeletal: Negative for joint swelling and neck pain  Skin: Negative for color change and pallor  Allergic/Immunologic: Negative for environmental allergies and immunocompromised state  Neurological: Negative for seizures and syncope  Hematological: Negative for adenopathy  Does not bruise/bleed easily  Psychiatric/Behavioral: Negative for confusion and suicidal ideas           PAST MEDICAL HISTORY:  Past Medical History:   Diagnosis Date    Acute ST elevation [facility] infarction [facility])     last assessed: 04/15/2016    Aortic valve disorder     Benign prostatic hyperplasia with lower urinary tract symptoms     CAD (coronary artery disease)     DM2 [facility]diabetes mellitus, type 2) ([facility] )     History of colonoscopy     resolved: 05/08/2012    History of transfusion     History of transient cerebral ischemia     HLD (hyperlipidemia)     HTN (hypertension)     Neuralgia        PAST SURGICAL HISTORY:  Past Surgical History:   Procedure Laterality Date    CARDIAC CATHETERIZATION      Outcome: successful; last assessed: 02/03/2015    CARDIAC CATHETERIZATION  11/26/2019    CORONARY ANGIOPLASTY WITH STENT PLACEMENT  2008    stent to LAD     HAND SURGERY      thumb    HIP HARDWARE REMOVAL      TOTAL HIP ARTHROPLASTY Right     TOTAL HIP ARTHROPLASTY Bilateral        SOCIAL HISTORY:  Social History Substance and Sexual Activity   Alcohol Use No     Social History     Substance and Sexual Activity   Drug Use No     Social History     Tobacco Use   Smoking Status Never Smoker   Smokeless Tobacco Never Used       FAMILY HISTORY:  Family History   Problem Relation Age of Onset    Emphysema Father     Emphysema Sister        ALLERGY:  Allergies   Allergen Reactions    Celecoxib     Hydromorphone     Pravastatin Hives    Statins        MEDICATIONS:    Current Outpatient Medications:     amLODIPine (NORVASC) 10 mg tablet, TAKE 1 TABLET BY MOUTH  DAILY, Disp: 90 tablet, Rfl: 0    aspirin (ECOTRIN LOW STRENGTH) 81 mg EC tablet, 1 tab daily, Disp: 30 tablet, Rfl: 4    Blood Glucose Monitoring Suppl (ONE TOUCH ULTRA MINI) w/Device KIT, by Does not apply route, Disp: , Rfl:     cholecalciferol (VITAMIN D3) 1,000 units tablet, Take 1,000 Units by mouth daily, Disp: , Rfl:     clopidogrel (PLAVIX) 75 mg tablet, TAKE 1 TABLET BY MOUTH  DAILY, Disp: 90 tablet, Rfl: 0    Cyanocobalamin ER (RA VITAMIN B-12) 1000 MCG TBCR, Take 2 tablets by mouth daily at bedtime  , Disp: , Rfl:     doxazosin (CARDURA) 8 MG tablet, Take 1 tablet (8 mg total) by mouth daily, Disp: 90 tablet, Rfl: 2    gabapentin (NEURONTIN) 100 mg capsule, Take 1 capsule (100 mg total) by mouth 2 (two) times a day for 90 days, Disp: 180 capsule, Rfl: 3    glucose blood (ONE TOUCH ULTRA TEST) test strip, Test once daily, Disp: 100 each, Rfl: 5    isosorbide dinitrate (ISORDIL) 10 mg tablet, Take 1 tablet (10 mg total) by mouth 2 (two) times a day, Disp: 180 tablet, Rfl: 3    lisinopril (ZESTRIL) 40 mg tablet, Take 1 tablet (40 mg total) by mouth daily, Disp: 90 tablet, Rfl: 3    metoprolol tartrate (LOPRESSOR) 25 mg tablet, Take 1 tablet (25 mg total) by mouth every 12 (twelve) hours, Disp: 180 tablet, Rfl: 0    omega-3-acid ethyl esters (LOVAZA) 1 g capsule, Take 1,200 mg by mouth daily, Disp: , Rfl:     oxybutynin (DITROPAN XL) 15 MG 24 hr tablet, Take 1 tablet by mouth daily, Disp: , Rfl: 11    pyridoxine (RA VITAMIN B-6) 100 MG tablet, Take 1 tablet by mouth daily, Disp: , Rfl:     vitamin B-12 (CYANOCOBALAMIN) 100 MCG tablet, Take 1,000 mcg by mouth daily, Disp: , Rfl:     folic acid-vitamin b complex-vitamin c-selenium-zinc (DIALYVITE) 3 MG TABS, Take 1 tablet by mouth daily, Disp: , Rfl:     PHYSICAL EXAM:  Vitals:    02/05/20 1307   BP: (!) 172/76   BP Location: Right arm   Patient Position: Sitting   Cuff Size: Standard   Pulse: 62   Resp: 16   Temp: (!) 97 3 °F (36 3 °C)   TempSrc: Tympanic   Weight: 102 kg (225 lb 6 4 oz)   Height: 5' 9" (1 753 m)     Body mass index is 33 29 kg/m²  Physical Exam   Constitutional: He appears well-nourished  No distress  HENT:   Head: Normocephalic and atraumatic  Eyes: No scleral icterus  Neck: Neck supple  No JVD present  Cardiovascular: Normal rate, S1 normal and S2 normal    Pulmonary/Chest: Effort normal  No accessory muscle usage  No respiratory distress  He has no wheezes  Abdominal: Soft  He exhibits no distension  Musculoskeletal: He exhibits no edema  Right ankle: He exhibits no swelling  Left ankle: He exhibits no swelling  Right hand: He exhibits no laceration  Left hand: He exhibits no laceration  Lymphadenopathy:        Right cervical: No superficial cervical adenopathy present  Left cervical: No superficial cervical adenopathy present  Neurological: He is alert  He is not disoriented  Skin: Skin is warm  No cyanosis  Psychiatric: He is not combative  He does not exhibit a depressed mood  He expresses no suicidal ideation         LAB RESULTS:  Results for orders placed or performed in visit on 78/70/40   Basic metabolic panel   Result Value Ref Range    Sodium 138 136 - 145 mmol/L    Potassium 4 4 3 5 - 5 3 mmol/L    Chloride 105 100 - 108 mmol/L    CO2 27 21 - 32 mmol/L    ANION GAP 6 4 - 13 mmol/L    BUN 28 (H) 5 - 25 mg/dL Creatinine 1 79 (H) 0 60 - 1 30 mg/dL    Glucose, Fasting 150 (H) 65 - 99 mg/dL    Calcium 9 0 8 3 - 10 1 mg/dL    eGFR 35 ml/min/1 73sq m       ASSESSMENT and PLAN:  Juan Knight was seen today for chronic kidney disease and follow-up  Diagnoses and all orders for this visit:    Stage 3 chronic kidney disease ([address] )  -     CBC and differential; Future  -     Basic metabolic panel; Future  -     UA (URINE) with reflex to Scope; Future  -     Microalbumin / creatinine urine ratio; Future  -     Phosphorus; Future  -     Uric acid; Future  -     PTH, intact; Future  -     Vitamin D 25 hydroxy; Future    Hypertensive kidney disease with stage 3 chronic kidney disease (HCC)  -     Basic metabolic panel; Future  -     VAS renal artery complete; Future    Other proteinuria  -     UA (URINE) with reflex to Scope; Future  -     Microalbumin / creatinine urine ratio; Future    Vitamin D deficiency  -     Vitamin D 25 hydroxy; Future    CAD S/P percutaneous coronary angioplasty  -     metoprolol tartrate (LOPRESSOR) 25 mg tablet; Take 1 tablet (25 mg total) by mouth every 12 (twelve) hours      1  CKD stage 3  Multifactorial and suspected due to underlying diabetic nephropathy on top of hypertensive nephrosclerosis plus advanced age  Upon review of old medical records, patient's new baseline creatinine seems to be fluctuating around 1 6-1 8 and in the interim renal function has remained stable with recent creatinine of 1 79 with EGFR of 35  Patient had underwent cardiac catheterization on 1/30/2020 and had mid and distal LAD stent placed  Management of diabetes and hypertension as mentioned below  Plan to avoid NSAIDs as and recheck renal function before next visit  2  Hypertension in chronic kidney disease  Today's blood pressure was elevated and above the goal   Plan to increase metoprolol to 25 mg PO BID today and continue doxazosin to 8 mg PO daily, amlodipine 10 mg PO daily and lisinopril 40 mg PO daily today  Patient was also advised to follow low-salt diet  Since patient's hypertension is uncontrolled and taking multiple antihypertensive medications, plan to check renal artery Doppler to rule out renal artery stenosis  3  Diabetes type 2 in chronic kidney disease  Recent Hb A1c was 7 2% (1/28/2020) which is above the goal for underlying chronic kidney disease  Goal Hb A1c would be < 7% for underlying chronic kidney disease  Continue to avoid metformin  Currently patient is not taking any antidiabetic medication  Defer further management of diabetes to PCP  4  Proteinuria  Last known urine microalbumin to creatinine ratio was 248 mg  Currently patient is on lisinopril 40 mg PO daily  Management of diabetes and hypertension as mentioned earlier  5  Vitamin-D deficiency  Last known vitamin-D level is 41 which is at goal   Currently calcium level is at goal   Continue cholecalciferol 1000 Units PO daily  Recheck vitamin-D level with the next visit  Returns to Nephrology office in 3 months  Future labs ordered  Patient's daughterCarissa can be reached at [phone]    Portions of the record may have been created with voice recognition software  Occasional wrong word or "sound a like" substitutions may have occurred due to the inherent limitations of voice recognition software  Read the chart carefully and recognize, using context, where substitutions have occurred  If you have any questions, please contact the dictating provider

## 2020-02-05 NOTE — PROGRESS NOTES
NEPHROLOGY OFFICE FOLLOW UP  Sawyer Perez 82 y o  male Date of Birth: @ MRN: [id number]    Encounter: [id number] DATE: 2/5/2020    REASON FOR VISIT: Sawyer Perez is a 82 y o  male returns to Nephrology office on 2/5/2020 for further management of chronic kidney disease  HPI:    This is a 65-year-old male with a past medical history of diabetes type 2, hypertension, coronary artery disease, returns to Nephrology office for further management of CKD stage 3  Patient's daughter-Barrie was also present at the time of consultation and history was also obtained from her and all the questions were answered  Upon review of old medical records patient's new baseline creatinine seems to be fluctuating around 1 6-1 8  In the interim patient had underwent cardiac catheterization on 1/30/2020 and had mid and distal LAD stent placed  Patient has hypertension and has been compliant with his antihypertensive medication but blood pressure seems to be elevated and does not seem to be under good control at this point  Patient has underlying diabetes type 2 which is also under well control with the use of current medications  Patient was also previously found to have proteinuria which was suspected due to underlying diabetic nephropathy on top of hypertensive nephrosclerosis  Patient takes lisinopril  Patient was also found to have vitamin-D deficiency and currently taking cholecalciferol 1000 Units PO daily  Patient takes all the medications as prescribed and denies use of NSAIDs  REVIEW OF SYSTEMS:    Review of Systems   Constitutional: Negative for chills and fever  HENT: Negative for nosebleeds and sore throat  Eyes: Negative for photophobia and pain  Respiratory: Negative for choking and wheezing  Cardiovascular: Negative for chest pain and palpitations  Gastrointestinal: Negative for abdominal pain and blood in stool  Endocrine: Negative for cold intolerance and heat intolerance  Genitourinary: Negative for flank pain and hematuria  Musculoskeletal: Negative for joint swelling and neck pain  Skin: Negative for color change and pallor  Allergic/Immunologic: Negative for environmental allergies and immunocompromised state  Neurological: Negative for seizures and syncope  Hematological: Negative for adenopathy  Does not bruise/bleed easily  Psychiatric/Behavioral: Negative for confusion and suicidal ideas           PAST MEDICAL HISTORY:  Past Medical History:   Diagnosis Date    Acute ST elevation myocardial infarction [facility])     last assessed: 04/15/2016    Aortic valve disorder     Benign prostatic hyperplasia with lower urinary tract symptoms     CAD (coronary artery disease)     DM2 (diabetes mellitus, type 2) (HCC)     History of colonoscopy     resolved: 05/08/2012    History of transfusion     History of transient cerebral ischemia     HLD (hyperlipidemia)     HTN (hypertension)     Neuralgia        PAST SURGICAL HISTORY:  Past Surgical History:   Procedure Laterality Date    CARDIAC CATHETERIZATION      Outcome: successful; last assessed: 02/03/2015    CARDIAC CATHETERIZATION  11/26/2019    CORONARY ANGIOPLASTY WITH STENT PLACEMENT  2008    stent to LAD     HAND SURGERY      thumb    HIP HARDWARE REMOVAL      TOTAL HIP ARTHROPLASTY Right     TOTAL HIP ARTHROPLASTY Bilateral        SOCIAL HISTORY:  Social History     Substance and Sexual Activity   Alcohol Use No     Social History     Substance and Sexual Activity   Drug Use No     Social History     Tobacco Use   Smoking Status Never Smoker   Smokeless Tobacco Never Used       FAMILY HISTORY:  Family History   Problem Relation Age of Onset    Emphysema Father     Emphysema Sister        ALLERGY:  Allergies   Allergen Reactions    Celecoxib     Hydromorphone     Pravastatin Hives    Statins        MEDICATIONS:    Current Outpatient Medications:     amLODIPine (NORVASC) 10 mg tablet, TAKE 1 TABLET BY MOUTH  DAILY, Disp: 90 tablet, Rfl: 0    aspirin (ECOTRIN LOW STRENGTH) 81 mg EC tablet, 1 tab daily, Disp: 30 tablet, Rfl: 4    Blood Glucose Monitoring Suppl (ONE TOUCH ULTRA MINI) w/Device KIT, by Does not apply route, Disp: , Rfl:     cholecalciferol (VITAMIN D3) 1,000 units tablet, Take 1,000 Units by mouth daily, Disp: , Rfl:     clopidogrel (PLAVIX) 75 mg tablet, TAKE 1 TABLET BY MOUTH  DAILY, Disp: 90 tablet, Rfl: 0    Cyanocobalamin ER (RA VITAMIN B-12) 1000 MCG TBCR, Take 2 tablets by mouth daily at bedtime  , Disp: , Rfl:     doxazosin (CARDURA) 8 MG tablet, Take 1 tablet (8 mg total) by mouth daily, Disp: 90 tablet, Rfl: 2    gabapentin (NEURONTIN) 100 mg capsule, Take 1 capsule (100 mg total) by mouth 2 (two) times a day for 90 days, Disp: 180 capsule, Rfl: 3    glucose blood (ONE TOUCH ULTRA TEST) test strip, Test once daily, Disp: 100 each, Rfl: 5    isosorbide dinitrate (ISORDIL) 10 mg tablet, Take 1 tablet (10 mg total) by mouth 2 (two) times a day, Disp: 180 tablet, Rfl: 3    lisinopril (ZESTRIL) 40 mg tablet, Take 1 tablet (40 mg total) by mouth daily, Disp: 90 tablet, Rfl: 3    metoprolol tartrate (LOPRESSOR) 25 mg tablet, Take 1 tablet (25 mg total) by mouth every 12 (twelve) hours, Disp: 180 tablet, Rfl: 0    omega-3-acid ethyl esters (LOVAZA) 1 g capsule, Take 1,200 mg by mouth daily, Disp: , Rfl:     oxybutynin (DITROPAN XL) 15 MG 24 hr tablet, Take 1 tablet by mouth daily, Disp: , Rfl: 11    pyridoxine (RA VITAMIN B-6) 100 MG tablet, Take 1 tablet by mouth daily, Disp: , Rfl:     vitamin B-12 (CYANOCOBALAMIN) 100 MCG tablet, Take 1,000 mcg by mouth daily, Disp: , Rfl:     folic acid-vitamin b complex-vitamin c-selenium-zinc (DIALYVITE) 3 MG TABS, Take 1 tablet by mouth daily, Disp: , Rfl:     PHYSICAL EXAM:  Vitals:    02/05/20 1307   BP: (!) 172/76   BP Location: Right arm   Patient Position: Sitting   Cuff Size: Standard   Pulse: 62   Resp: 16   Temp: (!) 97 3 °F (36 3 °C) TempSrc: Tympanic   Weight: 102 kg (225 lb 6 4 oz)   Height: 5' 9" (1 753 m)     Body mass index is 33 29 kg/m²  Physical Exam   Constitutional: He appears well-nourished  No distress  HENT:   Head: Normocephalic and atraumatic  Eyes: No scleral icterus  Neck: Neck supple  No JVD present  Cardiovascular: Normal rate, S1 normal and S2 normal    Pulmonary/Chest: Effort normal  No accessory muscle usage  No respiratory distress  He has no wheezes  Abdominal: Soft  He exhibits no distension  Musculoskeletal: He exhibits no edema  Right ankle: He exhibits no swelling  Left ankle: He exhibits no swelling  Right hand: He exhibits no laceration  Left hand: He exhibits no laceration  Lymphadenopathy:        Right cervical: No superficial cervical adenopathy present  Left cervical: No superficial cervical adenopathy present  Neurological: He is alert  He is not disoriented  Skin: Skin is warm  No cyanosis  Psychiatric: He is not combative  He does not exhibit a depressed mood  He expresses no suicidal ideation  LAB RESULTS:  Results for orders placed or performed in visit on 53/30/20   Basic metabolic panel   Result Value Ref Range    Sodium 138 136 - 145 mmol/L    Potassium 4 4 3 5 - 5 3 mmol/L    Chloride 105 100 - 108 mmol/L    CO2 27 21 - 32 mmol/L    ANION GAP 6 4 - 13 mmol/L    BUN 28 (H) 5 - 25 mg/dL    Creatinine 1 79 (H) 0 60 - 1 30 mg/dL    Glucose, Fasting 150 (H) 65 - 99 mg/dL    Calcium 9 0 8 3 - 10 1 mg/dL    eGFR 35 ml/min/1 73sq m       ASSESSMENT and PLAN:  Maddy Beverly was seen today for chronic kidney disease and follow-up  Diagnoses and all orders for this visit:    Stage 3 chronic kidney disease ([facility] )  -     CBC and differential; Future  -     Basic metabolic panel; Future  -     UA (URINE) with reflex to Scope; Future  -     Microalbumin / creatinine urine ratio; Future  -     Phosphorus; Future  -     Uric acid;  Future  -     PTH, intact; Future  -     Vitamin D 25 hydroxy; Future    Hypertensive kidney disease with stage 3 chronic kidney disease (HCC)  -     Basic metabolic panel; Future  -     VAS renal artery complete; Future    Other proteinuria  -     UA (URINE) with reflex to Scope; Future  -     Microalbumin / creatinine urine ratio; Future    Vitamin D deficiency  -     Vitamin D 25 hydroxy; Future    CAD S/P percutaneous coronary angioplasty  -     metoprolol tartrate (LOPRESSOR) 25 mg tablet; Take 1 tablet (25 mg total) by mouth every 12 (twelve) hours      1  CKD stage 3  Multifactorial and suspected due to underlying diabetic nephropathy on top of hypertensive nephrosclerosis plus advanced age  Upon review of old medical records, patient's new baseline creatinine seems to be fluctuating around 1 6-1 8 and in the interim renal function has remained stable with recent creatinine of 1 79 with EGFR of 35  Patient had underwent cardiac catheterization on 1/30/2020 and had mid and distal LAD stent placed  Management of diabetes and hypertension as mentioned below  Plan to avoid NSAIDs as and recheck renal function before next visit  2  Hypertension in chronic kidney disease  Today's blood pressure was elevated and above the goal   Plan to increase metoprolol to 25 mg PO BID today and continue doxazosin to 8 mg PO daily, amlodipine 10 mg PO daily and lisinopril 40 mg PO daily today  Patient was also advised to follow low-salt diet  Since patient's hypertension is uncontrolled and taking multiple antihypertensive medications, plan to check renal artery Doppler to rule out renal artery stenosis  3  Diabetes type 2 in chronic kidney disease  Recent Hb A1c was 7 2% (1/28/2020) which is above the goal for underlying chronic kidney disease  Goal Hb A1c would be < 7% for underlying chronic kidney disease  Continue to avoid metformin  Currently patient is not taking any antidiabetic medication    Defer further management of diabetes to PCP  4  Proteinuria  Last known urine microalbumin to creatinine ratio was 248 mg  Currently patient is on lisinopril 40 mg PO daily  Management of diabetes and hypertension as mentioned earlier  5  Vitamin-D deficiency  Last known vitamin-D level is 41 which is at goal   Currently calcium level is at goal   Continue cholecalciferol 1000 Units PO daily  Recheck vitamin-D level with the next visit  Returns to Nephrology office in 3 months  Future labs ordered  Patient's daughter-Barrie can be reached at [phone]    3/3/2020-patient was seen by cardiologist and found to have bradycardia and beta-blocker dose was reduced  Renal artery Doppler (reviewed on 3/25/2020)-called and discussed with patient's daughter today  Right renal artery was not visualized due to bowel gas but patent  Diffuse disease in left renal artery but patent  > 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  According to patient's daughter, patient is asymptomatic and refusing to see vascular surgeon at this point  Labs (reviewed on 5/6/2020)  Relatively stable creatinine at 1 86 with EGFR of 33  Hemoglobin 12 5  Urine analysis showed no dysuria  Urine microalbumin to creatinine ratio 190 mg-> monitor  Vitamin-D 46-> continue cholecalciferol 1000 Units PO daily  Normal PTH (41), uric acid (7 3), sodium, potassium, bicarb, calcium and phosphorus  Portions of the record may have been created with voice recognition software  Occasional wrong word or "sound a like" substitutions may have occurred due to the inherent limitations of voice recognition software  Read the chart carefully and recognize, using context, where substitutions have occurred  If you have any questions, please contact the dictating provider

## 2020-03-03 ENCOUNTER — OFFICE VISIT (OUTPATIENT)
Dept: CARDIOLOGY CLINIC | Facility: CLINIC | Age: 85
End: 2020-03-03
Payer: MEDICARE

## 2020-03-03 VITALS
SYSTOLIC BLOOD PRESSURE: 120 MMHG | WEIGHT: 226 LBS | HEIGHT: 69 IN | HEART RATE: 41 BPM | DIASTOLIC BLOOD PRESSURE: 58 MMHG | BODY MASS INDEX: 33.47 KG/M2 | OXYGEN SATURATION: 95 %

## 2020-03-03 DIAGNOSIS — I25.119 CORONARY ARTERY DISEASE INVOLVING NATIVE CORONARY ARTERY OF NATIVE HEART WITH ANGINA PECTORIS (HCC): Primary | ICD-10-CM

## 2020-03-03 DIAGNOSIS — Z51.81 ENCOUNTER FOR MONITORING ANTIPLATELET THERAPY: ICD-10-CM

## 2020-03-03 DIAGNOSIS — R00.1 BRADYCARDIA: ICD-10-CM

## 2020-03-03 DIAGNOSIS — E78.2 MIXED HYPERLIPIDEMIA: ICD-10-CM

## 2020-03-03 DIAGNOSIS — I10 ESSENTIAL HYPERTENSION: ICD-10-CM

## 2020-03-03 DIAGNOSIS — Z79.02 ENCOUNTER FOR MONITORING ANTIPLATELET THERAPY: ICD-10-CM

## 2020-03-03 PROCEDURE — 2022F DILAT RTA XM EVC RTNOPTHY: CPT | Performed by: INTERNAL MEDICINE

## 2020-03-03 PROCEDURE — 3074F SYST BP LT 130 MM HG: CPT | Performed by: INTERNAL MEDICINE

## 2020-03-03 PROCEDURE — 3066F NEPHROPATHY DOC TX: CPT | Performed by: INTERNAL MEDICINE

## 2020-03-03 PROCEDURE — 93000 ELECTROCARDIOGRAM COMPLETE: CPT | Performed by: INTERNAL MEDICINE

## 2020-03-03 PROCEDURE — 3008F BODY MASS INDEX DOCD: CPT | Performed by: INTERNAL MEDICINE

## 2020-03-03 PROCEDURE — 99214 OFFICE O/P EST MOD 30 MIN: CPT | Performed by: INTERNAL MEDICINE

## 2020-03-03 PROCEDURE — 1160F RVW MEDS BY RX/DR IN RCRD: CPT | Performed by: INTERNAL MEDICINE

## 2020-03-03 PROCEDURE — 3078F DIAST BP <80 MM HG: CPT | Performed by: INTERNAL MEDICINE

## 2020-03-03 PROCEDURE — 3051F HG A1C>EQUAL 7.0%<8.0%: CPT | Performed by: INTERNAL MEDICINE

## 2020-03-03 PROCEDURE — 4040F PNEUMOC VAC/ADMIN/RCVD: CPT | Performed by: INTERNAL MEDICINE

## 2020-03-03 PROCEDURE — 1036F TOBACCO NON-USER: CPT | Performed by: INTERNAL MEDICINE

## 2020-03-03 NOTE — PROGRESS NOTES
[facility] [address] [place] [address] [id number]  Cardiology Follow Up    Maria Guadalupe Hamilton  1935  [id number]      1  Coronary artery disease involving native coronary artery of native heart with angina pectoris ([facility] )     2  Essential hypertension     3  Encounter for monitoring antiplatelet therapy     4  Mixed hyperlipidemia     5  Bradycardia         Chief Complaint   Patient presents with    hospital follow-up       Interval History:  Patient presents for follow-up visit  Patient feels better in terms of shortness of breath  Patient had intervention of LAD at [facility] facility  Patient does have CKD  Patient has back pain which bothers him  No history of leg edema orthopnea PND  No history of chest pain arm pain neck pain or jaw pain  Patient had tendency for bradycardia      Patient Active Problem List   Diagnosis    Stroke ([facility] )    HTN (hypertension)    DM2 (diabetes mellitus, type 2) ([facility] )    HLD (hyperlipidemia)    CAD (coronary artery disease)    Right hip pain    Acute deep vein thrombosis (DVT) of brachial vein of left upper extremity (HCC)    At risk for polypharmacy    Encounter for monitoring antiplatelet therapy    Nonrheumatic aortic valve stenosis    Stage 3 chronic kidney disease ([facility] )    History of CVA (cerebrovascular accident)    Atrial fibrillation (HCC)    Hemiplegia and 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-dominant side ([facility] )    Type 2 diabetes mellitus without complication ([facility] )    Type 2 diabetes mellitus with kidney complication, without long-term current use of insulin ([facility] )    Poorly-controlled hypertension    Hypertensive kidney disease with stage 3 chronic kidney disease ([facility] )    Other proteinuria    Memory difficulty    Peroneal DVT (deep venous thrombosis), left    Vitamin D deficiency     Past Medical History:   Diagnosis Date    Acute ST elevation myocardial infarction [facility])     last assessed: 04/15/2016    Aortic valve disorder     Benign prostatic hyperplasia with lower urinary tract symptoms     CAD (coronary artery disease)     DM2 (diabetes mellitus, type 2) ([facility]     History of colonoscopy     resolved: 05/08/2012    History of transfusion     History of transient cerebral ischemia     HLD (hyperlipidemia)     HTN (hypertension)     Neuralgia      Social History     Socioeconomic History    Marital status:       Spouse name: Not on file    Number of children: Not on file    Years of education: Not on file    Highest education level: Not on file   Occupational History    Not on file   Social Needs    Financial resource strain: Not on file    Food insecurity:     Worry: Not on file     Inability: Not on file    Transportation needs:     Medical: Not on file     Non-medical: Not on file   Tobacco Use    Smoking status: Never Smoker    Smokeless tobacco: Never Used   Substance and Sexual Activity    Alcohol use: No    Drug use: No    Sexual activity: Not Currently     Partners: Female     Birth control/protection: None   Lifestyle    Physical activity:     Days per week: 0 days     Minutes per session: 0 min    Stress: Not at all   Relationships    Social connections:     Talks on phone: Not on file     Gets together: Not on file     Attends Jehovah's witness service: Not on file     Active member of club or organization: Not on file     Attends meetings of clubs or organizations: Not on file     Relationship status: Not on file    Intimate partner violence:     Fear of current or ex partner: Not on file     Emotionally abused: Not on file     Physically abused: Not on file     Forced sexual activity: Not on file   Other Topics Concern    Not on file   Social History Narrative    Active advance directive (as per [employer])     No advance directive on file (as per [employer])       Family History   Problem Relation Age of Onset    Emphysema Father     Emphysema Sister      Past Surgical History:   Procedure Laterality Date    CARDIAC CATHETERIZATION      Outcome: successful; last assessed: 02/03/2015    CARDIAC CATHETERIZATION  11/26/2019    CORONARY ANGIOPLASTY WITH STENT PLACEMENT  2008    stent to LAD     HAND SURGERY      thumb    HIP HARDWARE REMOVAL      TOTAL HIP ARTHROPLASTY Right     TOTAL HIP ARTHROPLASTY Bilateral        Current Outpatient Medications:     amLODIPine (NORVASC) 10 mg tablet, TAKE 1 TABLET BY MOUTH  DAILY, Disp: 90 tablet, Rfl: 0    aspirin (ECOTRIN LOW STRENGTH) 81 mg EC tablet, 1 tab daily, Disp: 30 tablet, Rfl: 4    Blood Glucose Monitoring Suppl (ONE TOUCH ULTRA MINI) w/Device KIT, by Does not apply route, Disp: , Rfl:     cholecalciferol (VITAMIN D3) 1,000 units tablet, Take 1,000 Units by mouth daily, Disp: , Rfl:     clopidogrel (PLAVIX) 75 mg tablet, TAKE 1 TABLET BY MOUTH  DAILY, Disp: 90 tablet, Rfl: 0    doxazosin (CARDURA) 8 MG tablet, Take 1 tablet (8 mg total) by mouth daily, Disp: 90 tablet, Rfl: 2    gabapentin (NEURONTIN) 100 mg capsule, Take 1 capsule (100 mg total) by mouth 2 (two) times a day for 90 days, Disp: 180 capsule, Rfl: 3    glucose blood (ONE TOUCH ULTRA TEST) test strip, Test once daily, Disp: 100 each, Rfl: 5    isosorbide dinitrate (ISORDIL) 10 mg tablet, Take 1 tablet (10 mg total) by mouth 2 (two) times a day, Disp: 180 tablet, Rfl: 3    lisinopril (ZESTRIL) 40 mg tablet, Take 1 tablet (40 mg total) by mouth daily, Disp: 90 tablet, Rfl: 3    metoprolol tartrate (LOPRESSOR) 25 mg tablet, Take 1 tablet (25 mg total) by mouth every 12 (twelve) hours, Disp: 180 tablet, Rfl: 0    omega-3-acid ethyl esters (LOVAZA) 1 g capsule, Take 1,200 mg by mouth daily, Disp: , Rfl:     oxybutynin (DITROPAN XL) 15 MG 24 hr tablet, Take 1 tablet by mouth daily, Disp: , Rfl: 11    pyridoxine (RA VITAMIN B-6) 100 MG tablet, Take 1 tablet by mouth daily, Disp: , Rfl:     vitamin B-12 (CYANOCOBALAMIN) 100 MCG tablet, Take 1,000 mcg by mouth daily, Disp: , Rfl:   Allergies   Allergen Reactions    Celecoxib     Hydromorphone     Pravastatin Hives    Statins        Labs:  Appointment on 02/03/2020   Component Date Value    Sodium 02/03/2020 138     Potassium 02/03/2020 4 4     Chloride 02/03/2020 105     CO2 02/03/2020 27     ANION GAP 02/03/2020 6     BUN 02/03/2020 28*    Creatinine 02/03/2020 1 79*    Glucose, Fasting 02/03/2020 150*    Calcium 02/03/2020 9 0     eGFR 02/03/2020 35    Admission on 01/30/2020, Discharged on 01/31/2020   Component Date Value    Sodium 01/30/2020 139     Potassium 01/30/2020 4 3     Chloride 01/30/2020 107     CO2 01/30/2020 28     ANION GAP 01/30/2020 4     BUN 01/30/2020 33*    Creatinine 01/30/2020 1 88*    Glucose 01/30/2020 141*    Glucose, Fasting 01/30/2020 141*    Calcium 01/30/2020 9 3     eGFR 01/30/2020 33     WBC 01/30/2020 5 94     RBC 01/30/2020 4 22     Hemoglobin 01/30/2020 13 3     Hematocrit 01/30/2020 40 3     MCV 01/30/2020 96     MCH 01/30/2020 31 5     MCHC 01/30/2020 33 0     RDW 01/30/2020 12 8     Platelets 58/11/0861 176     MPV 01/30/2020 10 1     Cholesterol 01/30/2020 207*    Triglycerides 01/30/2020 109     HDL, Direct 01/30/2020 50     LDL Calculated 01/30/2020 135*    Non-HDL-Chol (CHOL-HDL) 01/30/2020 157     Magnesium 01/30/2020 2 2     Protime 01/30/2020 12 8     INR 01/30/2020 1 00     Ventricular Rate 01/30/2020 39     Atrial Rate 01/30/2020 39     AL Interval 01/30/2020 276     QRSD Interval 01/30/2020 152     QT Interval 01/30/2020 536     QTC Interval 01/30/2020 431     P Axis 01/30/2020 75     QRS Axis 01/30/2020 47     T Wave Axis 01/30/2020 88     POC Glucose 01/30/2020 122     Activated Clotting Time,* 01/30/2020 224*    Specimen Type 01/30/2020 VENOUS     Ventricular Rate 01/30/2020 42     Atrial Rate 01/30/2020 42     AL Interval 01/30/2020 238     QRSD Interval 01/30/2020 154     QT Interval 01/30/2020 540     QTC Interval 01/30/2020 450     P Axis 01/30/2020 0     QRS Axis 01/30/2020 59     T Wave Axis 01/30/2020 14     POC Glucose 01/30/2020 132     WBC 01/31/2020 5 58     RBC 01/31/2020 4 00     Hemoglobin 01/31/2020 12 3     Hematocrit 01/31/2020 38 0     MCV 01/31/2020 95     MCH 01/31/2020 30 8     MCHC 01/31/2020 32 4     RDW 01/31/2020 12 6     Platelets 87/09/5799 147*    MPV 01/31/2020 10 0     Sodium 01/31/2020 138     Potassium 01/31/2020 4 0     Chloride 01/31/2020 109*    CO2 01/31/2020 24     ANION GAP 01/31/2020 5     BUN 01/31/2020 25     Creatinine 01/31/2020 1 42*    Glucose 01/31/2020 130     Calcium 01/31/2020 8 9     eGFR 01/31/2020 46     POC Glucose 01/31/2020 113    Office Visit on 01/28/2020   Component Date Value    Hemoglobin A1C 01/28/2020 7 2*   Appointment on 01/21/2020   Component Date Value    WBC 01/21/2020 6 46     RBC 01/21/2020 4 05     Hemoglobin 01/21/2020 12 7     Hematocrit 01/21/2020 39 0     MCV 01/21/2020 96     MCH 01/21/2020 31 4     MCHC 01/21/2020 32 6     RDW 01/21/2020 12 8     MPV 01/21/2020 10 6     Platelets 37/72/9563 165     nRBC 01/21/2020 0     Neutrophils Relative 01/21/2020 48     Immat GRANS % 01/21/2020 0     Lymphocytes Relative 01/21/2020 38     Monocytes Relative 01/21/2020 7     Eosinophils Relative 01/21/2020 6     Basophils Relative 01/21/2020 1     Neutrophils Absolute 01/21/2020 3 11     Immature Grans Absolute 01/21/2020 0 01     Lymphocytes Absolute 01/21/2020 2 46     Monocytes Absolute 01/21/2020 0 45     Eosinophils Absolute 01/21/2020 0 38     Basophils Absolute 01/21/2020 0 05     Sodium 01/21/2020 137     Potassium 01/21/2020 4 2     Chloride 01/21/2020 104     CO2 01/21/2020 28     ANION GAP 01/21/2020 5     BUN 01/21/2020 41*    Creatinine 01/21/2020 1 93*    Glucose, Fasting 01/21/2020 186*    Calcium 01/21/2020 9 2     AST 01/21/2020 14     ALT 01/21/2020 18     Alkaline Phosphatase 01/21/2020 60     Total Protein 01/21/2020 7 4     Albumin 01/21/2020 3 5     Total Bilirubin 01/21/2020 0 30     eGFR 01/21/2020 32      Imaging: No results found  Review of Systems:  Review of Systems   REVIEW OF SYSTEMS:  Constitutional:  Denies fever or chills   Eyes:  Denies change in visual acuity   HENT:  Denies nasal congestion or sore throat   Respiratory:  Denies cough or shortness of breath   Cardiovascular:  Denies chest pain or edema   GI:  Denies abdominal pain, nausea, vomiting, bloody stools or diarrhea   :  Denies dysuria, frequency, difficulty in micturition and nocturia  Musculoskeletal:  DJD  Neurologic:  Denies headache, focal weakness or sensory changes   Endocrine:  Denies polyuria or polydipsia   Lymphatic:  Denies swollen glands   Psychiatric:  Denies depression or anxiety     Physical Exam:    /58   Pulse (!) 41   Ht 5' 9" (1 753 m)   Wt 103 kg (226 lb)   SpO2 95%   BMI 33 37 kg/m²     Physical Exam   PHYSICAL EXAM:  General:  Patient is not in acute distress   Head: Normocephalic, Atraumatic  HEENT:  Both pupils normal-size atraumatic, normocephalic, nonicteric  Neck:  JVP not raised  Trachea central  No carotid bruit  Respiratory:  Decreased breath sounds bilaterally  Cardiovascular:  Regular rate and rhythm no S3 2/6 systolic ejection murmur in the right sternal border  GI:  Abdomen soft nontender  No organomegaly  Lymphatic:  No cervical or inguinal lymphadenopathy  Neurologic:  Patient is awake alert, oriented   Grossly nonfocal  Extremities trace edema    EKG shows sinus bradycardia    Discussion/Summary:  Patient with multiple medical problems who seems to be doing reasonably well from cardiac standpoint  Previous studies reviewed with patient  Medications reviewed and possible side effects discussed  concepts of cardiovascular disease , signs and symptoms of heart disease   Dietary and risk factor modification reinforced  All questions answered  Safety measures reviewed  Patient advised to report any problems prompting medical attention  Given significant bradycardia will decrease the dosage of beta-blockers  The rationale explained to the patient and family  24 hour Holter monitor to assess for any significant bradycardia  Events of recent hospitalization and cardiac intervention reviewed with patient and family  Evaluation by Nephrology also reviewed  Follow-up in a few months or earlier as needed  Follow-up with primary care physician

## 2020-03-10 ENCOUNTER — HOSPITAL ENCOUNTER (OUTPATIENT)
Dept: NON INVASIVE DIAGNOSTICS | Facility: CLINIC | Age: 85
Discharge: HOME | End: 2020-03-10
Payer: MEDICARE

## 2020-03-10 DIAGNOSIS — R00.1 BRADYCARDIA: ICD-10-CM

## 2020-03-10 PROCEDURE — 93226 XTRNL ECG REC<48 HR SCAN A/R: CPT

## 2020-03-10 PROCEDURE — 93225 XTRNL ECG REC<48 HRS REC: CPT

## 2020-03-14 PROCEDURE — 93227 XTRNL ECG REC<48 HR R&I: CPT | Performed by: INTERNAL MEDICINE

## 2020-03-15 DIAGNOSIS — Z98.61 CAD S/P PERCUTANEOUS CORONARY ANGIOPLASTY: ICD-10-CM

## 2020-03-15 DIAGNOSIS — I25.10 CAD S/P PERCUTANEOUS CORONARY ANGIOPLASTY: ICD-10-CM

## 2020-03-16 ENCOUNTER — TELEPHONE (OUTPATIENT)
Dept: CARDIOLOGY CLINIC | Facility: CLINIC | Age: 85
End: 2020-03-16

## 2020-03-16 NOTE — TELEPHONE ENCOUNTER
Patient's daughter called back,, her sister is a nurse and wants to know what his ejection fraction is

## 2020-03-16 NOTE — TELEPHONE ENCOUNTER
----- Message from Sushma Gomez MD sent at 3/15/2020 11:47 AM EDT -----  Please call  Holter monitor overall okay except periods of low heart rate at night to be expected  To stay on the present dose of Lopressor which was recently decreased

## 2020-03-18 DIAGNOSIS — I63.9 CEREBROVASCULAR ACCIDENT (CVA), UNSPECIFIED MECHANISM (HCC): ICD-10-CM

## 2020-03-18 RX ORDER — CLOPIDOGREL BISULFATE 75 MG/1
TABLET ORAL
Qty: 90 TABLET | Refills: 0 | Status: SHIPPED | OUTPATIENT
Start: 2020-03-18 | End: 2020-06-01

## 2020-03-24 ENCOUNTER — HOSPITAL ENCOUNTER (OUTPATIENT)
Dept: NON INVASIVE DIAGNOSTICS | Facility: CLINIC | Age: 85
Discharge: HOME | End: 2020-03-24
Payer: MEDICARE

## 2020-03-24 DIAGNOSIS — I12.9 HYPERTENSIVE KIDNEY DISEASE WITH STAGE 3 CHRONIC KIDNEY DISEASE (HCC): ICD-10-CM

## 2020-03-24 DIAGNOSIS — N18.30 HYPERTENSIVE KIDNEY DISEASE WITH STAGE 3 CHRONIC KIDNEY DISEASE (HCC): ICD-10-CM

## 2020-03-24 PROCEDURE — 93975 VASCULAR STUDY: CPT | Performed by: SURGERY

## 2020-03-24 PROCEDURE — 93975 VASCULAR STUDY: CPT

## 2020-03-29 DIAGNOSIS — I10 ESSENTIAL HYPERTENSION: ICD-10-CM

## 2020-03-29 DIAGNOSIS — G89.4 CHRONIC PAIN SYNDROME: ICD-10-CM

## 2020-03-29 RX ORDER — AMLODIPINE BESYLATE 10 MG/1
TABLET ORAL
Qty: 90 TABLET | Refills: 0 | Status: SHIPPED | OUTPATIENT
Start: 2020-03-29 | End: 2020-08-25

## 2020-03-30 RX ORDER — GABAPENTIN 100 MG/1
CAPSULE ORAL
Qty: 180 CAPSULE | Refills: 3 | Status: SHIPPED | OUTPATIENT
Start: 2020-03-30 | End: 2020-07-23 | Stop reason: SDUPTHER

## 2020-04-24 ENCOUNTER — TELEMEDICINE (OUTPATIENT)
Dept: CARDIOLOGY CLINIC | Facility: CLINIC | Age: 85
End: 2020-04-24
Payer: MEDICARE

## 2020-04-24 VITALS
DIASTOLIC BLOOD PRESSURE: 53 MMHG | SYSTOLIC BLOOD PRESSURE: 127 MMHG | WEIGHT: 224 LBS | HEIGHT: 69 IN | BODY MASS INDEX: 33.18 KG/M2

## 2020-04-24 DIAGNOSIS — E78.2 MIXED HYPERLIPIDEMIA: ICD-10-CM

## 2020-04-24 DIAGNOSIS — Z79.02 ENCOUNTER FOR MONITORING ANTIPLATELET THERAPY: ICD-10-CM

## 2020-04-24 DIAGNOSIS — Z98.61 CAD S/P PERCUTANEOUS CORONARY ANGIOPLASTY: ICD-10-CM

## 2020-04-24 DIAGNOSIS — I25.119 CORONARY ARTERY DISEASE INVOLVING NATIVE CORONARY ARTERY OF NATIVE HEART WITH ANGINA PECTORIS (HCC): Primary | ICD-10-CM

## 2020-04-24 DIAGNOSIS — Z51.81 ENCOUNTER FOR MONITORING ANTIPLATELET THERAPY: ICD-10-CM

## 2020-04-24 DIAGNOSIS — I10 ESSENTIAL HYPERTENSION: ICD-10-CM

## 2020-04-24 DIAGNOSIS — I25.10 CAD S/P PERCUTANEOUS CORONARY ANGIOPLASTY: ICD-10-CM

## 2020-04-24 PROCEDURE — 99214 OFFICE O/P EST MOD 30 MIN: CPT | Performed by: INTERNAL MEDICINE

## 2020-04-24 RX ORDER — NITROGLYCERIN 0.4 MG/1
0.4 TABLET SUBLINGUAL
Qty: 25 TABLET | Refills: 4 | Status: SHIPPED | OUTPATIENT
Start: 2020-04-24 | End: 2021-07-20 | Stop reason: HOSPADM

## 2020-05-01 ENCOUNTER — TELEPHONE (OUTPATIENT)
Dept: NEUROLOGY | Facility: CLINIC | Age: 85
End: 2020-05-01

## 2020-05-04 ENCOUNTER — OFFICE VISIT (OUTPATIENT)
Dept: NEUROLOGY | Facility: CLINIC | Age: 85
End: 2020-05-04
Payer: MEDICARE

## 2020-05-04 VITALS
SYSTOLIC BLOOD PRESSURE: 144 MMHG | WEIGHT: 218 LBS | BODY MASS INDEX: 32.29 KG/M2 | RESPIRATION RATE: 16 BRPM | HEART RATE: 58 BPM | DIASTOLIC BLOOD PRESSURE: 68 MMHG | HEIGHT: 69 IN

## 2020-05-04 DIAGNOSIS — N18.30 TYPE 2 DIABETES MELLITUS WITH STAGE 3 CHRONIC KIDNEY DISEASE, WITHOUT LONG-TERM CURRENT USE OF INSULIN (HCC): ICD-10-CM

## 2020-05-04 DIAGNOSIS — Z86.73 HISTORY OF CVA (CEREBROVASCULAR ACCIDENT): Primary | ICD-10-CM

## 2020-05-04 DIAGNOSIS — I65.1 BASILAR ARTERY STENOSIS: ICD-10-CM

## 2020-05-04 DIAGNOSIS — I25.118 CORONARY ARTERY DISEASE INVOLVING NATIVE CORONARY ARTERY OF NATIVE HEART WITH OTHER FORM OF ANGINA PECTORIS (HCC): ICD-10-CM

## 2020-05-04 DIAGNOSIS — E11.22 TYPE 2 DIABETES MELLITUS WITH STAGE 3 CHRONIC KIDNEY DISEASE, WITHOUT LONG-TERM CURRENT USE OF INSULIN (HCC): ICD-10-CM

## 2020-05-04 DIAGNOSIS — I10 ESSENTIAL HYPERTENSION: ICD-10-CM

## 2020-05-04 DIAGNOSIS — R41.3 MEMORY DIFFICULTY: ICD-10-CM

## 2020-05-04 PROCEDURE — 4040F PNEUMOC VAC/ADMIN/RCVD: CPT | Performed by: PSYCHIATRY & NEUROLOGY

## 2020-05-04 PROCEDURE — 2022F DILAT RTA XM EVC RTNOPTHY: CPT | Performed by: PSYCHIATRY & NEUROLOGY

## 2020-05-04 PROCEDURE — 3066F NEPHROPATHY DOC TX: CPT | Performed by: PSYCHIATRY & NEUROLOGY

## 2020-05-04 PROCEDURE — 3077F SYST BP >= 140 MM HG: CPT | Performed by: PSYCHIATRY & NEUROLOGY

## 2020-05-04 PROCEDURE — 3008F BODY MASS INDEX DOCD: CPT | Performed by: PSYCHIATRY & NEUROLOGY

## 2020-05-04 PROCEDURE — 99214 OFFICE O/P EST MOD 30 MIN: CPT | Performed by: PSYCHIATRY & NEUROLOGY

## 2020-05-04 PROCEDURE — 1160F RVW MEDS BY RX/DR IN RCRD: CPT | Performed by: PSYCHIATRY & NEUROLOGY

## 2020-05-04 PROCEDURE — 1036F TOBACCO NON-USER: CPT | Performed by: PSYCHIATRY & NEUROLOGY

## 2020-05-04 PROCEDURE — 3051F HG A1C>EQUAL 7.0%<8.0%: CPT | Performed by: PSYCHIATRY & NEUROLOGY

## 2020-05-04 PROCEDURE — 3078F DIAST BP <80 MM HG: CPT | Performed by: PSYCHIATRY & NEUROLOGY

## 2020-05-04 RX ORDER — RIVASTIGMINE 4.6 MG/24H
1 PATCH, EXTENDED RELEASE TRANSDERMAL DAILY
Qty: 30 PATCH | Refills: 0 | Status: SHIPPED | OUTPATIENT
Start: 2020-05-04 | End: 2020-05-06 | Stop reason: CLARIF

## 2020-05-04 RX ORDER — RIVASTIGMINE 9.5 MG/24H
1 PATCH, EXTENDED RELEASE TRANSDERMAL DAILY
Qty: 30 PATCH | Refills: 5 | Status: SHIPPED | OUTPATIENT
Start: 2020-05-04 | End: 2020-05-06 | Stop reason: CLARIF

## 2020-05-05 ENCOUNTER — APPOINTMENT (OUTPATIENT)
Dept: LAB | Facility: HOSPITAL | Age: 85
End: 2020-05-05
Attending: INTERNAL MEDICINE
Payer: MEDICARE

## 2020-05-05 ENCOUNTER — TELEPHONE (OUTPATIENT)
Dept: NEUROLOGY | Facility: CLINIC | Age: 85
End: 2020-05-05

## 2020-05-05 DIAGNOSIS — E55.9 VITAMIN D DEFICIENCY: ICD-10-CM

## 2020-05-05 DIAGNOSIS — I12.9 HYPERTENSIVE KIDNEY DISEASE WITH STAGE 3 CHRONIC KIDNEY DISEASE (HCC): ICD-10-CM

## 2020-05-05 DIAGNOSIS — R80.8 OTHER PROTEINURIA: ICD-10-CM

## 2020-05-05 DIAGNOSIS — N18.30 HYPERTENSIVE KIDNEY DISEASE WITH STAGE 3 CHRONIC KIDNEY DISEASE (HCC): ICD-10-CM

## 2020-05-05 DIAGNOSIS — N18.30 STAGE 3 CHRONIC KIDNEY DISEASE (HCC): ICD-10-CM

## 2020-05-05 LAB
25(OH)D3 SERPL-MCNC: 46.7 NG/ML (ref 30–100)
ANION GAP SERPL CALCULATED.3IONS-SCNC: 8 MMOL/L (ref 4–13)
BACTERIA UR QL AUTO: NORMAL /HPF
BASOPHILS # BLD AUTO: 0.05 THOUSANDS/ΜL (ref 0–0.1)
BASOPHILS NFR BLD AUTO: 1 % (ref 0–1)
BILIRUB UR QL STRIP: NEGATIVE
BUN SERPL-MCNC: 34 MG/DL (ref 5–25)
CALCIUM SERPL-MCNC: 9.1 MG/DL (ref 8.3–10.1)
CHLORIDE SERPL-SCNC: 106 MMOL/L (ref 100–108)
CLARITY UR: CLEAR
CO2 SERPL-SCNC: 28 MMOL/L (ref 21–32)
COLOR UR: YELLOW
CREAT SERPL-MCNC: 1.86 MG/DL (ref 0.6–1.3)
CREAT UR-MCNC: 139 MG/DL
EOSINOPHIL # BLD AUTO: 0.3 THOUSAND/ΜL (ref 0–0.61)
EOSINOPHIL NFR BLD AUTO: 5 % (ref 0–6)
ERYTHROCYTE [DISTWIDTH] IN BLOOD BY AUTOMATED COUNT: 12.6 % (ref 11.6–15.1)
GFR SERPL CREATININE-BSD FRML MDRD: 33 ML/MIN/1.73SQ M
GLUCOSE P FAST SERPL-MCNC: 146 MG/DL (ref 65–99)
GLUCOSE UR STRIP-MCNC: NEGATIVE MG/DL
HCT VFR BLD AUTO: 38.8 % (ref 36.5–49.3)
HGB BLD-MCNC: 12.5 G/DL (ref 12–17)
HGB UR QL STRIP.AUTO: NEGATIVE
IMM GRANULOCYTES # BLD AUTO: 0.02 THOUSAND/UL (ref 0–0.2)
IMM GRANULOCYTES NFR BLD AUTO: 0 % (ref 0–2)
KETONES UR STRIP-MCNC: NEGATIVE MG/DL
LEUKOCYTE ESTERASE UR QL STRIP: NEGATIVE
LYMPHOCYTES # BLD AUTO: 2.14 THOUSANDS/ΜL (ref 0.6–4.47)
LYMPHOCYTES NFR BLD AUTO: 35 % (ref 14–44)
MCH RBC QN AUTO: 31.1 PG (ref 26.8–34.3)
MCHC RBC AUTO-ENTMCNC: 32.2 G/DL (ref 31.4–37.4)
MCV RBC AUTO: 97 FL (ref 82–98)
MICROALBUMIN UR-MCNC: 264 MG/L (ref 0–20)
MICROALBUMIN/CREAT 24H UR: 190 MG/G CREATININE (ref 0–30)
MONOCYTES # BLD AUTO: 0.43 THOUSAND/ΜL (ref 0.17–1.22)
MONOCYTES NFR BLD AUTO: 7 % (ref 4–12)
NEUTROPHILS # BLD AUTO: 3.1 THOUSANDS/ΜL (ref 1.85–7.62)
NEUTS SEG NFR BLD AUTO: 52 % (ref 43–75)
NITRITE UR QL STRIP: NEGATIVE
NON-SQ EPI CELLS URNS QL MICRO: NORMAL /HPF
NRBC BLD AUTO-RTO: 0 /100 WBCS
PH UR STRIP.AUTO: 5.5 [PH]
PHOSPHATE SERPL-MCNC: 3.3 MG/DL (ref 2.3–4.1)
PLATELET # BLD AUTO: 162 THOUSANDS/UL (ref 149–390)
PMV BLD AUTO: 10.1 FL (ref 8.9–12.7)
POTASSIUM SERPL-SCNC: 5.1 MMOL/L (ref 3.5–5.3)
PROT UR STRIP-MCNC: ABNORMAL MG/DL
PTH-INTACT SERPL-MCNC: 41.9 PG/ML (ref 18.4–80.1)
RBC # BLD AUTO: 4.02 MILLION/UL (ref 3.88–5.62)
RBC #/AREA URNS AUTO: NORMAL /HPF
SODIUM SERPL-SCNC: 142 MMOL/L (ref 136–145)
SP GR UR STRIP.AUTO: 1.01 (ref 1–1.03)
URATE SERPL-MCNC: 7.3 MG/DL (ref 4.2–8)
UROBILINOGEN UR QL STRIP.AUTO: 0.2 E.U./DL
WBC # BLD AUTO: 6.04 THOUSAND/UL (ref 4.31–10.16)
WBC #/AREA URNS AUTO: NORMAL /HPF

## 2020-05-05 PROCEDURE — 84550 ASSAY OF BLOOD/URIC ACID: CPT

## 2020-05-05 PROCEDURE — 82306 VITAMIN D 25 HYDROXY: CPT

## 2020-05-05 PROCEDURE — 82043 UR ALBUMIN QUANTITATIVE: CPT

## 2020-05-05 PROCEDURE — 84100 ASSAY OF PHOSPHORUS: CPT

## 2020-05-05 PROCEDURE — 82570 ASSAY OF URINE CREATININE: CPT

## 2020-05-05 PROCEDURE — 83970 ASSAY OF PARATHORMONE: CPT

## 2020-05-05 PROCEDURE — 81001 URINALYSIS AUTO W/SCOPE: CPT

## 2020-05-05 PROCEDURE — 85025 COMPLETE CBC W/AUTO DIFF WBC: CPT

## 2020-05-05 PROCEDURE — 36415 COLL VENOUS BLD VENIPUNCTURE: CPT

## 2020-05-05 PROCEDURE — 80048 BASIC METABOLIC PNL TOTAL CA: CPT

## 2020-05-06 DIAGNOSIS — R41.3 MEMORY DIFFICULTY: Primary | ICD-10-CM

## 2020-05-06 RX ORDER — RIVASTIGMINE TARTRATE 3 MG/1
3 CAPSULE ORAL 2 TIMES DAILY
Qty: 60 CAPSULE | Refills: 4 | Status: SHIPPED | OUTPATIENT
Start: 2020-05-06 | End: 2020-06-05 | Stop reason: ALTCHOICE

## 2020-05-07 ENCOUNTER — OFFICE VISIT (OUTPATIENT)
Dept: NEPHROLOGY | Facility: CLINIC | Age: 85
End: 2020-05-07
Payer: MEDICARE

## 2020-05-07 VITALS
RESPIRATION RATE: 16 BRPM | SYSTOLIC BLOOD PRESSURE: 182 MMHG | TEMPERATURE: 96.7 F | HEART RATE: 53 BPM | BODY MASS INDEX: 32.88 KG/M2 | DIASTOLIC BLOOD PRESSURE: 80 MMHG | HEIGHT: 69 IN | WEIGHT: 222 LBS

## 2020-05-07 DIAGNOSIS — N18.30 STAGE 3 CHRONIC KIDNEY DISEASE (HCC): Primary | ICD-10-CM

## 2020-05-07 DIAGNOSIS — N18.30 HYPERTENSIVE KIDNEY DISEASE WITH STAGE 3 CHRONIC KIDNEY DISEASE (HCC): ICD-10-CM

## 2020-05-07 DIAGNOSIS — E55.9 VITAMIN D DEFICIENCY: ICD-10-CM

## 2020-05-07 DIAGNOSIS — R80.8 OTHER PROTEINURIA: ICD-10-CM

## 2020-05-07 DIAGNOSIS — I12.9 HYPERTENSIVE KIDNEY DISEASE WITH STAGE 3 CHRONIC KIDNEY DISEASE (HCC): ICD-10-CM

## 2020-05-07 PROCEDURE — 3066F NEPHROPATHY DOC TX: CPT | Performed by: INTERNAL MEDICINE

## 2020-05-07 PROCEDURE — 3060F POS MICROALBUMINURIA REV: CPT | Performed by: INTERNAL MEDICINE

## 2020-05-07 PROCEDURE — 2022F DILAT RTA XM EVC RTNOPTHY: CPT | Performed by: INTERNAL MEDICINE

## 2020-05-07 PROCEDURE — 4040F PNEUMOC VAC/ADMIN/RCVD: CPT | Performed by: INTERNAL MEDICINE

## 2020-05-07 PROCEDURE — 3079F DIAST BP 80-89 MM HG: CPT | Performed by: INTERNAL MEDICINE

## 2020-05-07 PROCEDURE — 3077F SYST BP >= 140 MM HG: CPT | Performed by: INTERNAL MEDICINE

## 2020-05-07 PROCEDURE — 99214 OFFICE O/P EST MOD 30 MIN: CPT | Performed by: INTERNAL MEDICINE

## 2020-05-07 PROCEDURE — 3051F HG A1C>EQUAL 7.0%<8.0%: CPT | Performed by: INTERNAL MEDICINE

## 2020-05-07 PROCEDURE — 3008F BODY MASS INDEX DOCD: CPT | Performed by: INTERNAL MEDICINE

## 2020-05-07 PROCEDURE — 1036F TOBACCO NON-USER: CPT | Performed by: INTERNAL MEDICINE

## 2020-05-07 PROCEDURE — 1160F RVW MEDS BY RX/DR IN RCRD: CPT | Performed by: INTERNAL MEDICINE

## 2020-05-19 LAB
LEFT EYE DIABETIC RETINOPATHY: NORMAL
RIGHT EYE DIABETIC RETINOPATHY: NORMAL

## 2020-05-30 DIAGNOSIS — Z00.00 HEALTH CARE MAINTENANCE: ICD-10-CM

## 2020-05-30 DIAGNOSIS — I63.9 CEREBROVASCULAR ACCIDENT (CVA), UNSPECIFIED MECHANISM (HCC): ICD-10-CM

## 2020-06-01 RX ORDER — CLOPIDOGREL BISULFATE 75 MG/1
TABLET ORAL
Qty: 90 TABLET | Refills: 0 | Status: SHIPPED | OUTPATIENT
Start: 2020-06-01 | End: 2020-09-03 | Stop reason: SDUPTHER

## 2020-06-01 RX ORDER — DOXAZOSIN 8 MG/1
TABLET ORAL
Qty: 90 TABLET | Refills: 2 | Status: SHIPPED | OUTPATIENT
Start: 2020-06-01 | End: 2021-06-26

## 2020-06-05 ENCOUNTER — TELEPHONE (OUTPATIENT)
Dept: INTERNAL MEDICINE CLINIC | Facility: CLINIC | Age: 85
End: 2020-06-05

## 2020-06-05 ENCOUNTER — OFFICE VISIT (OUTPATIENT)
Dept: INTERNAL MEDICINE CLINIC | Facility: CLINIC | Age: 85
End: 2020-06-05
Payer: MEDICARE

## 2020-06-05 ENCOUNTER — APPOINTMENT (OUTPATIENT)
Dept: RADIOLOGY | Facility: CLINIC | Age: 85
End: 2020-06-05
Payer: MEDICARE

## 2020-06-05 VITALS — TEMPERATURE: 98.6 F | HEART RATE: 60 BPM | OXYGEN SATURATION: 95 %

## 2020-06-05 DIAGNOSIS — R06.02 SOB (SHORTNESS OF BREATH): Primary | ICD-10-CM

## 2020-06-05 DIAGNOSIS — Z20.828 EXPOSURE TO SARS-ASSOCIATED CORONAVIRUS: ICD-10-CM

## 2020-06-05 DIAGNOSIS — J02.9 SORE THROAT: ICD-10-CM

## 2020-06-05 DIAGNOSIS — R06.02 SOB (SHORTNESS OF BREATH): ICD-10-CM

## 2020-06-05 LAB — S PYO AG THROAT QL: NEGATIVE

## 2020-06-05 PROCEDURE — 2022F DILAT RTA XM EVC RTNOPTHY: CPT | Performed by: NURSE PRACTITIONER

## 2020-06-05 PROCEDURE — 1036F TOBACCO NON-USER: CPT | Performed by: NURSE PRACTITIONER

## 2020-06-05 PROCEDURE — U0003 INFECTIOUS AGENT DETECTION BY NUCLEIC ACID (DNA OR RNA); SEVERE ACUTE RESPIRATORY SYNDROME CORONAVIRUS 2 (SARS-COV-2) (CORONAVIRUS DISEASE [COVID-19]), AMPLIFIED PROBE TECHNIQUE, MAKING USE OF HIGH THROUGHPUT TECHNOLOGIES AS DESCRIBED BY CMS-2020-01-R: HCPCS

## 2020-06-05 PROCEDURE — 3077F SYST BP >= 140 MM HG: CPT | Performed by: NURSE PRACTITIONER

## 2020-06-05 PROCEDURE — 3066F NEPHROPATHY DOC TX: CPT | Performed by: NURSE PRACTITIONER

## 2020-06-05 PROCEDURE — 3060F POS MICROALBUMINURIA REV: CPT | Performed by: NURSE PRACTITIONER

## 2020-06-05 PROCEDURE — 99214 OFFICE O/P EST MOD 30 MIN: CPT | Performed by: NURSE PRACTITIONER

## 2020-06-05 PROCEDURE — 4040F PNEUMOC VAC/ADMIN/RCVD: CPT | Performed by: NURSE PRACTITIONER

## 2020-06-05 PROCEDURE — 3079F DIAST BP 80-89 MM HG: CPT | Performed by: NURSE PRACTITIONER

## 2020-06-05 PROCEDURE — 3051F HG A1C>EQUAL 7.0%<8.0%: CPT | Performed by: NURSE PRACTITIONER

## 2020-06-05 PROCEDURE — 87880 STREP A ASSAY W/OPTIC: CPT | Performed by: NURSE PRACTITIONER

## 2020-06-05 PROCEDURE — 71046 X-RAY EXAM CHEST 2 VIEWS: CPT

## 2020-06-05 PROCEDURE — 1160F RVW MEDS BY RX/DR IN RCRD: CPT | Performed by: NURSE PRACTITIONER

## 2020-06-07 LAB — SARS-COV-2 RNA SPEC QL NAA+PROBE: NOT DETECTED

## 2020-06-12 DIAGNOSIS — E11.8 TYPE 2 DIABETES MELLITUS WITH COMPLICATION, WITHOUT LONG-TERM CURRENT USE OF INSULIN (HCC): ICD-10-CM

## 2020-06-29 ENCOUNTER — TELEPHONE (OUTPATIENT)
Dept: INTERNAL MEDICINE CLINIC | Facility: CLINIC | Age: 85
End: 2020-06-29

## 2020-06-30 ENCOUNTER — OFFICE VISIT (OUTPATIENT)
Dept: INTERNAL MEDICINE CLINIC | Facility: CLINIC | Age: 85
End: 2020-06-30
Payer: MEDICARE

## 2020-06-30 ENCOUNTER — TELEPHONE (OUTPATIENT)
Dept: INTERNAL MEDICINE CLINIC | Facility: CLINIC | Age: 85
End: 2020-06-30

## 2020-06-30 VITALS
OXYGEN SATURATION: 95 % | WEIGHT: 217.4 LBS | HEART RATE: 48 BPM | BODY MASS INDEX: 32.2 KG/M2 | SYSTOLIC BLOOD PRESSURE: 142 MMHG | TEMPERATURE: 97.5 F | DIASTOLIC BLOOD PRESSURE: 64 MMHG | HEIGHT: 69 IN

## 2020-06-30 DIAGNOSIS — I25.10 CAD S/P PERCUTANEOUS CORONARY ANGIOPLASTY: ICD-10-CM

## 2020-06-30 DIAGNOSIS — Z98.61 CAD S/P PERCUTANEOUS CORONARY ANGIOPLASTY: ICD-10-CM

## 2020-06-30 DIAGNOSIS — I63.9 CEREBROVASCULAR ACCIDENT (CVA), UNSPECIFIED MECHANISM (HCC): ICD-10-CM

## 2020-06-30 DIAGNOSIS — I10 ESSENTIAL HYPERTENSION: ICD-10-CM

## 2020-06-30 DIAGNOSIS — I25.118 CORONARY ARTERY DISEASE INVOLVING NATIVE CORONARY ARTERY OF NATIVE HEART WITH OTHER FORM OF ANGINA PECTORIS (HCC): ICD-10-CM

## 2020-06-30 DIAGNOSIS — E11.9 TYPE 2 DIABETES MELLITUS WITHOUT COMPLICATION, UNSPECIFIED WHETHER LONG TERM INSULIN USE (HCC): Primary | ICD-10-CM

## 2020-06-30 PROCEDURE — 3051F HG A1C>EQUAL 7.0%<8.0%: CPT | Performed by: INTERNAL MEDICINE

## 2020-06-30 PROCEDURE — 1160F RVW MEDS BY RX/DR IN RCRD: CPT | Performed by: INTERNAL MEDICINE

## 2020-06-30 PROCEDURE — 4040F PNEUMOC VAC/ADMIN/RCVD: CPT | Performed by: INTERNAL MEDICINE

## 2020-06-30 PROCEDURE — 3008F BODY MASS INDEX DOCD: CPT | Performed by: INTERNAL MEDICINE

## 2020-06-30 PROCEDURE — 99214 OFFICE O/P EST MOD 30 MIN: CPT | Performed by: INTERNAL MEDICINE

## 2020-06-30 PROCEDURE — 3066F NEPHROPATHY DOC TX: CPT | Performed by: INTERNAL MEDICINE

## 2020-06-30 PROCEDURE — 2022F DILAT RTA XM EVC RTNOPTHY: CPT | Performed by: INTERNAL MEDICINE

## 2020-06-30 PROCEDURE — 3060F POS MICROALBUMINURIA REV: CPT | Performed by: INTERNAL MEDICINE

## 2020-06-30 PROCEDURE — 1036F TOBACCO NON-USER: CPT | Performed by: INTERNAL MEDICINE

## 2020-06-30 PROCEDURE — 3078F DIAST BP <80 MM HG: CPT | Performed by: INTERNAL MEDICINE

## 2020-06-30 PROCEDURE — 3077F SYST BP >= 140 MM HG: CPT | Performed by: INTERNAL MEDICINE

## 2020-07-09 DIAGNOSIS — I48.91 ATRIAL FIBRILLATION, UNSPECIFIED TYPE (HCC): ICD-10-CM

## 2020-07-09 DIAGNOSIS — E11.22 TYPE 2 DIABETES MELLITUS WITH STAGE 3 CHRONIC KIDNEY DISEASE, WITHOUT LONG-TERM CURRENT USE OF INSULIN (HCC): Primary | ICD-10-CM

## 2020-07-09 DIAGNOSIS — I10 ESSENTIAL HYPERTENSION: ICD-10-CM

## 2020-07-09 DIAGNOSIS — E78.2 MIXED HYPERLIPIDEMIA: ICD-10-CM

## 2020-07-09 DIAGNOSIS — N18.30 TYPE 2 DIABETES MELLITUS WITH STAGE 3 CHRONIC KIDNEY DISEASE, WITHOUT LONG-TERM CURRENT USE OF INSULIN (HCC): Primary | ICD-10-CM

## 2020-07-12 ENCOUNTER — APPOINTMENT (EMERGENCY)
Dept: RADIOLOGY | Facility: HOSPITAL | Age: 85
DRG: 641 | End: 2020-07-12
Payer: MEDICARE

## 2020-07-12 ENCOUNTER — HOSPITAL ENCOUNTER (INPATIENT)
Facility: HOSPITAL | Age: 85
LOS: 1 days | Discharge: HOME | DRG: 641 | End: 2020-07-13
Attending: EMERGENCY MEDICINE | Admitting: FAMILY MEDICINE
Payer: MEDICARE

## 2020-07-12 DIAGNOSIS — N17.9 ACUTE KIDNEY INJURY (HCC): ICD-10-CM

## 2020-07-12 DIAGNOSIS — R00.1 SEVERE SINUS BRADYCARDIA: ICD-10-CM

## 2020-07-12 DIAGNOSIS — R55 NEAR SYNCOPE: Primary | ICD-10-CM

## 2020-07-12 PROBLEM — R42 POSTURAL DIZZINESS WITH PRESYNCOPE: Status: ACTIVE | Noted: 2020-07-12

## 2020-07-12 LAB
ALBUMIN SERPL BCP-MCNC: 3.4 G/DL (ref 3.5–5)
ALP SERPL-CCNC: 47 U/L (ref 46–116)
ALT SERPL W P-5'-P-CCNC: 16 U/L (ref 12–78)
ANION GAP SERPL CALCULATED.3IONS-SCNC: 8 MMOL/L (ref 4–13)
AST SERPL W P-5'-P-CCNC: 20 U/L (ref 5–45)
ATRIAL RATE: 52 BPM
BASOPHILS # BLD AUTO: 0.04 THOUSANDS/ΜL (ref 0–0.1)
BASOPHILS NFR BLD AUTO: 1 % (ref 0–1)
BILIRUB DIRECT SERPL-MCNC: 0.13 MG/DL (ref 0–0.2)
BILIRUB SERPL-MCNC: 0.4 MG/DL (ref 0.2–1)
BUN SERPL-MCNC: 37 MG/DL (ref 5–25)
CALCIUM SERPL-MCNC: 9 MG/DL (ref 8.3–10.1)
CHLORIDE SERPL-SCNC: 104 MMOL/L (ref 100–108)
CO2 SERPL-SCNC: 27 MMOL/L (ref 21–32)
CREAT SERPL-MCNC: 2.34 MG/DL (ref 0.6–1.3)
EOSINOPHIL # BLD AUTO: 0.25 THOUSAND/ΜL (ref 0–0.61)
EOSINOPHIL NFR BLD AUTO: 4 % (ref 0–6)
ERYTHROCYTE [DISTWIDTH] IN BLOOD BY AUTOMATED COUNT: 12.7 % (ref 11.6–15.1)
GFR SERPL CREATININE-BSD FRML MDRD: 25 ML/MIN/1.73SQ M
GLUCOSE SERPL-MCNC: 105 MG/DL (ref 65–140)
GLUCOSE SERPL-MCNC: 128 MG/DL (ref 65–140)
GLUCOSE SERPL-MCNC: 130 MG/DL (ref 65–140)
GLUCOSE SERPL-MCNC: 162 MG/DL (ref 65–140)
HCT VFR BLD AUTO: 35.2 % (ref 36.5–49.3)
HGB BLD-MCNC: 11.4 G/DL (ref 12–17)
IMM GRANULOCYTES # BLD AUTO: 0.02 THOUSAND/UL (ref 0–0.2)
IMM GRANULOCYTES NFR BLD AUTO: 0 % (ref 0–2)
INR PPP: 0.99 (ref 0.84–1.19)
LYMPHOCYTES # BLD AUTO: 1.38 THOUSANDS/ΜL (ref 0.6–4.47)
LYMPHOCYTES NFR BLD AUTO: 24 % (ref 14–44)
MCH RBC QN AUTO: 31 PG (ref 26.8–34.3)
MCHC RBC AUTO-ENTMCNC: 32.4 G/DL (ref 31.4–37.4)
MCV RBC AUTO: 96 FL (ref 82–98)
MONOCYTES # BLD AUTO: 0.34 THOUSAND/ΜL (ref 0.17–1.22)
MONOCYTES NFR BLD AUTO: 6 % (ref 4–12)
NEUTROPHILS # BLD AUTO: 3.81 THOUSANDS/ΜL (ref 1.85–7.62)
NEUTS SEG NFR BLD AUTO: 65 % (ref 43–75)
NRBC BLD AUTO-RTO: 0 /100 WBCS
NT-PROBNP SERPL-MCNC: 1654 PG/ML
P AXIS: 72 DEGREES
PLATELET # BLD AUTO: 140 THOUSANDS/UL (ref 149–390)
PLATELET # BLD AUTO: 147 THOUSANDS/UL (ref 149–390)
PMV BLD AUTO: 10 FL (ref 8.9–12.7)
PMV BLD AUTO: 9.9 FL (ref 8.9–12.7)
POTASSIUM SERPL-SCNC: 4.5 MMOL/L (ref 3.5–5.3)
PR INTERVAL: 244 MS
PROT SERPL-MCNC: 7.2 G/DL (ref 6.4–8.2)
PROTHROMBIN TIME: 13.2 SECONDS (ref 11.6–14.5)
QRS AXIS: 76 DEGREES
QRSD INTERVAL: 158 MS
QT INTERVAL: 498 MS
QTC INTERVAL: 463 MS
RBC # BLD AUTO: 3.68 MILLION/UL (ref 3.88–5.62)
SARS-COV-2 RNA RESP QL NAA+PROBE: NEGATIVE
SODIUM SERPL-SCNC: 139 MMOL/L (ref 136–145)
T WAVE AXIS: 68 DEGREES
TROPONIN I SERPL-MCNC: 0.03 NG/ML
VENTRICULAR RATE: 52 BPM
WBC # BLD AUTO: 5.84 THOUSAND/UL (ref 4.31–10.16)

## 2020-07-12 PROCEDURE — 87635 SARS-COV-2 COVID-19 AMP PRB: CPT | Performed by: EMERGENCY MEDICINE

## 2020-07-12 PROCEDURE — 99223 1ST HOSP IP/OBS HIGH 75: CPT | Performed by: FAMILY MEDICINE

## 2020-07-12 PROCEDURE — 99285 EMERGENCY DEPT VISIT HI MDM: CPT | Performed by: EMERGENCY MEDICINE

## 2020-07-12 PROCEDURE — 85025 COMPLETE CBC W/AUTO DIFF WBC: CPT | Performed by: EMERGENCY MEDICINE

## 2020-07-12 PROCEDURE — 93005 ELECTROCARDIOGRAM TRACING: CPT

## 2020-07-12 PROCEDURE — 1124F ACP DISCUSS-NO DSCNMKR DOCD: CPT | Performed by: NURSE PRACTITIONER

## 2020-07-12 PROCEDURE — 99285 EMERGENCY DEPT VISIT HI MDM: CPT

## 2020-07-12 PROCEDURE — 80048 BASIC METABOLIC PNL TOTAL CA: CPT | Performed by: EMERGENCY MEDICINE

## 2020-07-12 PROCEDURE — 84484 ASSAY OF TROPONIN QUANT: CPT | Performed by: EMERGENCY MEDICINE

## 2020-07-12 PROCEDURE — 96360 HYDRATION IV INFUSION INIT: CPT

## 2020-07-12 PROCEDURE — 80076 HEPATIC FUNCTION PANEL: CPT | Performed by: EMERGENCY MEDICINE

## 2020-07-12 PROCEDURE — 85610 PROTHROMBIN TIME: CPT | Performed by: EMERGENCY MEDICINE

## 2020-07-12 PROCEDURE — 71046 X-RAY EXAM CHEST 2 VIEWS: CPT

## 2020-07-12 PROCEDURE — 83880 ASSAY OF NATRIURETIC PEPTIDE: CPT | Performed by: EMERGENCY MEDICINE

## 2020-07-12 PROCEDURE — 82948 REAGENT STRIP/BLOOD GLUCOSE: CPT

## 2020-07-12 PROCEDURE — 93010 ELECTROCARDIOGRAM REPORT: CPT | Performed by: INTERNAL MEDICINE

## 2020-07-12 PROCEDURE — 85049 AUTOMATED PLATELET COUNT: CPT | Performed by: FAMILY MEDICINE

## 2020-07-12 PROCEDURE — 36415 COLL VENOUS BLD VENIPUNCTURE: CPT | Performed by: EMERGENCY MEDICINE

## 2020-07-12 RX ORDER — ISOSORBIDE DINITRATE 10 MG/1
10 TABLET ORAL 2 TIMES DAILY
Status: DISCONTINUED | OUTPATIENT
Start: 2020-07-12 | End: 2020-07-13 | Stop reason: HOSPADM

## 2020-07-12 RX ORDER — ATROPINE SULFATE 1 MG/ML
1 INJECTION, SOLUTION INTRAMUSCULAR; INTRAVENOUS; SUBCUTANEOUS ONCE
Status: DISCONTINUED | OUTPATIENT
Start: 2020-07-12 | End: 2020-07-12

## 2020-07-12 RX ORDER — DOXAZOSIN MESYLATE 4 MG/1
8 TABLET ORAL DAILY
Status: DISCONTINUED | OUTPATIENT
Start: 2020-07-12 | End: 2020-07-13 | Stop reason: HOSPADM

## 2020-07-12 RX ORDER — ATROPINE SULFATE 0.1 MG/ML
1 SYRINGE (ML) INJECTION ONCE
Status: COMPLETED | OUTPATIENT
Start: 2020-07-12 | End: 2020-07-12

## 2020-07-12 RX ORDER — CLOPIDOGREL BISULFATE 75 MG/1
75 TABLET ORAL DAILY
Status: DISCONTINUED | OUTPATIENT
Start: 2020-07-12 | End: 2020-07-13 | Stop reason: HOSPADM

## 2020-07-12 RX ORDER — GABAPENTIN 100 MG/1
100 CAPSULE ORAL 2 TIMES DAILY
Status: DISCONTINUED | OUTPATIENT
Start: 2020-07-12 | End: 2020-07-13 | Stop reason: HOSPADM

## 2020-07-12 RX ORDER — NITROGLYCERIN 0.4 MG/1
0.4 TABLET SUBLINGUAL
Status: DISCONTINUED | OUTPATIENT
Start: 2020-07-12 | End: 2020-07-13 | Stop reason: HOSPADM

## 2020-07-12 RX ORDER — AMLODIPINE BESYLATE 10 MG/1
10 TABLET ORAL DAILY
Status: DISCONTINUED | OUTPATIENT
Start: 2020-07-12 | End: 2020-07-13 | Stop reason: HOSPADM

## 2020-07-12 RX ORDER — HEPARIN SODIUM 5000 [USP'U]/ML
5000 INJECTION, SOLUTION INTRAVENOUS; SUBCUTANEOUS EVERY 8 HOURS SCHEDULED
Status: DISCONTINUED | OUTPATIENT
Start: 2020-07-12 | End: 2020-07-13 | Stop reason: HOSPADM

## 2020-07-12 RX ORDER — PYRIDOXINE HCL (VITAMIN B6) 50 MG
100 TABLET ORAL DAILY
Status: DISCONTINUED | OUTPATIENT
Start: 2020-07-12 | End: 2020-07-13 | Stop reason: HOSPADM

## 2020-07-12 RX ORDER — OXYBUTYNIN CHLORIDE 5 MG/1
15 TABLET, EXTENDED RELEASE ORAL DAILY
Status: DISCONTINUED | OUTPATIENT
Start: 2020-07-12 | End: 2020-07-13 | Stop reason: HOSPADM

## 2020-07-12 RX ORDER — ASPIRIN 81 MG/1
81 TABLET ORAL DAILY
Status: DISCONTINUED | OUTPATIENT
Start: 2020-07-12 | End: 2020-07-13 | Stop reason: HOSPADM

## 2020-07-12 RX ORDER — SODIUM CHLORIDE 9 MG/ML
100 INJECTION, SOLUTION INTRAVENOUS CONTINUOUS
Status: DISCONTINUED | OUTPATIENT
Start: 2020-07-12 | End: 2020-07-13 | Stop reason: HOSPADM

## 2020-07-12 RX ADMIN — SODIUM CHLORIDE 1000 ML: 0.9 INJECTION, SOLUTION INTRAVENOUS at 11:06

## 2020-07-12 RX ADMIN — HEPARIN SODIUM 5000 UNITS: 5000 INJECTION INTRAVENOUS; SUBCUTANEOUS at 21:43

## 2020-07-12 RX ADMIN — SODIUM CHLORIDE 100 ML/HR: 0.9 INJECTION, SOLUTION INTRAVENOUS at 22:43

## 2020-07-12 RX ADMIN — ISOSORBIDE DINITRATE 10 MG: 10 TABLET ORAL at 18:31

## 2020-07-12 RX ADMIN — HEPARIN SODIUM 5000 UNITS: 5000 INJECTION INTRAVENOUS; SUBCUTANEOUS at 16:01

## 2020-07-12 RX ADMIN — SODIUM CHLORIDE 100 ML/HR: 0.9 INJECTION, SOLUTION INTRAVENOUS at 16:03

## 2020-07-12 RX ADMIN — GABAPENTIN 100 MG: 100 CAPSULE ORAL at 18:31

## 2020-07-12 NOTE — PLAN OF CARE
Problem: CARDIOVASCULAR - ADULT  Goal: Maintains optimal cardiac output and hemodynamic stability  Description  INTERVENTIONS:  - Monitor I/O, vital signs and rhythm  - Monitor for S/S and trends of decreased cardiac output  - Administer and titrate ordered vasoactive medications to optimize hemodynamic stability  - Assess quality of pulses, skin color and temperature  - Assess for signs of decreased coronary artery perfusion  - Instruct patient to report change in severity of symptoms  7/12/2020 1852 by Janet Quinonez RN  Outcome: Progressing  7/12/2020 1851 by Janet Quinonez RN  Outcome: Progressing  Goal: Absence of cardiac dysrhythmias or at baseline rhythm  Description  INTERVENTIONS:  - Continuous cardiac monitoring, vital signs, obtain 12 lead EKG if ordered  - Administer antiarrhythmic and heart rate control medications as ordered  - Monitor electrolytes and administer replacement therapy as ordered  7/12/2020 1852 by Janet Quinonez RN  Outcome: Progressing  7/12/2020 1851 by Janet Quinonez RN  Outcome: Progressing     Problem: METABOLIC, FLUID AND ELECTROLYTES - ADULT  Goal: Electrolytes maintained within normal limits  Description  INTERVENTIONS:  - Monitor labs and assess patient for signs and symptoms of electrolyte imbalances  - Administer electrolyte replacement as ordered  - Monitor response to electrolyte replacements, including repeat lab results as appropriate  - Instruct patient on fluid and nutrition as appropriate  7/12/2020 1852 by Janet Quinonez RN  Outcome: Progressing  7/12/2020 1851 by Janet Quinonez RN  Outcome: Progressing  Goal: Fluid balance maintained  Description  INTERVENTIONS:  - Monitor labs   - Monitor I/O and WT  - Instruct patient on fluid and nutrition as appropriate  - Assess for signs & symptoms of volume excess or deficit  7/12/2020 1852 by Janet Quinonez RN  Outcome: Progressing  7/12/2020 1851 by Kayley Hubbard RN  Outcome: Progressing  Goal: Glucose maintained within target range  Description  INTERVENTIONS:  - Monitor Blood Glucose as ordered  - Assess for signs and symptoms of hyperglycemia and hypoglycemia  - Administer ordered medications to maintain glucose within target range  - Assess nutritional intake and initiate nutrition service referral as needed  7/12/2020 1852 by Kayley Hubbard RN  Outcome: Progressing  7/12/2020 1851 by Kayley Hubbard RN  Outcome: Progressing     Problem: SAFETY ADULT  Goal: Patient will remain free of falls  Description  INTERVENTIONS:  - Assess patient frequently for physical needs  -  Identify cognitive and physical deficits and behaviors that affect risk of falls    -  [place] fall precautions as indicated by assessment   - Educate patient/family on patient safety including physical limitations  - Instruct patient to call for assistance with activity based on assessment  - Modify environment to reduce risk of injury  - Consider OT/PT consult to assist with strengthening/mobility  7/12/2020 1852 by Kayley Hubbard RN  Outcome: Progressing  7/12/2020 1851 by Kayley Hubbard RN  Outcome: Progressing  Goal: Maintain or return to baseline ADL function  Description  INTERVENTIONS:  -  Assess patient's ability to carry out ADLs; assess patient's baseline for ADL function and identify physical deficits which impact ability to perform ADLs (bathing, care of mouth/teeth, toileting, grooming, dressing, etc )  - Assess/evaluate cause of self-care deficits   - Assess range of motion  - Assess patient's mobility; develop plan if impaired  - Assess patient's need for assistive devices and provide as appropriate  - Encourage maximum independence but intervene and supervise when necessary  - Involve family in performance of ADLs  - Assess for home care needs following discharge   - Consider OT consult to assist with ADL evaluation and planning for discharge  - Provide patient education as appropriate  7/12/2020 1852 by Jody Perez RN  Outcome: Progressing  7/12/2020 1851 by Jody Perez RN  Outcome: Progressing  Goal: Maintain or return mobility status to optimal level  Description  INTERVENTIONS:  - Assess patient's baseline mobility status (ambulation, transfers, stairs, etc )    - Identify cognitive and physical deficits and behaviors that affect mobility  - Identify mobility aids required to assist with transfers and/or ambulation (gait belt, sit-to-stand, lift, walker, cane, etc )  - [place] fall precautions as indicated by assessment  - Record patient progress and toleration of activity level on Mobility SBAR; progress patient to next Phase/Stage  - Instruct patient to call for assistance with activity based on assessment  - Consider rehabilitation consult to assist with strengthening/weightbearing, etc   7/12/2020 1852 by Jody Perez RN  Outcome: Progressing  7/12/2020 1851 by Jody Perez RN  Outcome: Progressing     Problem: DISCHARGE PLANNING  Goal: Discharge to home or other facility with appropriate resources  Description  INTERVENTIONS:  - Identify barriers to discharge w/patient and caregiver  - Arrange for needed discharge resources and transportation as appropriate  - Identify discharge learning needs (meds, wound care, etc )  - Arrange for interpretive services to assist at discharge as needed  - Refer to Case Management Department for coordinating discharge planning if the patient needs post-hospital services based on physician/advanced practitioner order or complex needs related to functional status, cognitive ability, or social support system  7/12/2020 1852 by Jody Perez RN  Outcome: Progressing  7/12/2020 1851 by Jody Perez RN  Outcome: Progressing     Problem: Knowledge Deficit  Goal: Patient/family/caregiver demonstrates understanding of disease process, treatment plan, medications, and discharge instructions  Description  Complete learning assessment and assess knowledge base    Interventions:  - Provide teaching at level of understanding  - Provide teaching via preferred learning methods  7/12/2020 1852 by Americo Rashid RN  Outcome: Progressing  7/12/2020 1851 by Americo Rashid RN  Outcome: Progressing

## 2020-07-12 NOTE — ASSESSMENT & PLAN NOTE
Currently worsened creatinine at 2 3  Baseline is around 1 4  Will hydrate with normal saline at 100 cc an hour  Will reassess BMP tomorrow  Hold Ace inhibitors and other nephrotoxic agents

## 2020-07-12 NOTE — ED PROVIDER NOTES
History  Chief Complaint   Patient presents with    Syncope     Patient was at [employer] when had a near-syncopal episode  Patient states he felt dizzy and felt "sick to his stomach "  + diaphoresis  As per EMS, patient's HR was in the 30's, given Atropine 0 5 mg  Patient denies chest pain, SOB, headache     HPI patient is an 61-year-old male, in [employer] this morning apparently became very lightheaded felt sick to his stomach, was diaphoretic, EMS reports heart rate in the 30s  Patient was given atropine 0 5 mg and heart rate is currently in the 50s  Patient is currently asymptomatic  EMS reports sinus bradycardia  Again patient currently asymptomatic reports that he took his normal medicines this morning  Patient denies any chest pain  Denies any headache  He reports he never completely passed out he feels like he almost passed out  I spoke with patient's brother who witnessed the event he reports the patient was sweaty and nauseous but did not completely go out conscious  Past medical history hypertension renal insufficiency baseline creatinine 1 79, CAD, aortic valve disorder  Family history noncontributory  Social history, nonsmoker, here with his brother, no history of drug abuse    Prior to Admission Medications   Prescriptions Last Dose Informant Patient Reported? Taking?    Blood Glucose Monitoring Suppl (ONE TOUCH ULTRA MINI) w/Device KIT 2020 at 0700 Child Yes Yes   Sig: by Does not apply route   amLODIPine (NORVASC) 10 mg tablet 2020 at 0700 Child No Yes   Sig: TAKE 1 TABLET BY MOUTH  DAILY   aspirin (ECOTRIN LOW STRENGTH) 81 mg EC tablet 2020 at 0700 Child No Yes   Si tab daily   cholecalciferol (VITAMIN D3) 1,000 units tablet 2020 at 0700 Child Yes Yes   Sig: Take 1,000 Units by mouth daily   clopidogrel (PLAVIX) 75 mg tablet 2020 at 0700 Child No Yes   Sig: TAKE 1 TABLET BY MOUTH  DAILY   doxazosin (CARDURA) 8 MG tablet 2020 at 0700 Child No Yes   Sig: TAKE 1 TABLET BY MOUTH  DAILY   gabapentin (NEURONTIN) 100 mg capsule 7/12/2020 at 0700 Child No Yes   Sig: TAKE 1 CAPSULE BY MOUTH  TWICE DAILY   glucose blood (ONE TOUCH ULTRA TEST) test strip 7/12/2020 at 0710 Child No Yes   Sig: Test once daily   isosorbide dinitrate (ISORDIL) 10 mg tablet 7/12/2020 at 0700 Child No Yes   Sig: Take 1 tablet (10 mg total) by mouth 2 (two) times a day   lisinopril (ZESTRIL) 40 mg tablet 7/12/2020 at 0700 Child No Yes   Sig: Take 1 tablet (40 mg total) by mouth daily   metoprolol tartrate (LOPRESSOR) 25 mg tablet 7/12/2020 at 0700  No Yes   Sig: Take 1/2 tablet daily   Patient taking differently: Take 25 mg by mouth daily Take 1/2 tablet daily   nitroglycerin (NITROSTAT) 0 4 mg SL tablet More than a month at Unknown time Child No No   Sig: Place 1 tablet (0 4 mg total) under the tongue every 5 (five) minutes as needed for chest pain   omega-3-acid ethyl esters (LOVAZA) 1 g capsule 7/11/2020 at Unknown time Child Yes Yes   Sig: Take 1,200 mg by mouth daily   oxybutynin (DITROPAN XL) 15 MG 24 hr tablet 7/12/2020 at 0700 Child Yes Yes   Sig: Take 1 tablet by mouth daily   pyridoxine (RA VITAMIN B-6) 100 MG tablet 7/12/2020 at 0700 Child Yes Yes   Sig: Take 1 tablet by mouth daily   vitamin B-12 (CYANOCOBALAMIN) 100 MCG tablet 7/12/2020 at 0700 Child Yes Yes   Sig: Take 1,000 mcg by mouth daily      Facility-Administered Medications: None       Past Medical History:   Diagnosis Date    Acute ST elevation myocardial infarction [facility])     last assessed: 04/15/2016    Aortic valve disorder     Benign prostatic hyperplasia with lower urinary tract symptoms     CAD (coronary artery disease)     Chronic kidney disease     DM2 (diabetes mellitus, type 2) ([facility] [address] )     History of colonoscopy     resolved: 05/08/2012    History of transfusion     History of transient cerebral ischemia     HLD (hyperlipidemia)     HTN (hypertension)     Neuralgia        Past Surgical History:   Procedure Laterality Date    CARDIAC CATHETERIZATION      Outcome: successful; last assessed: 02/03/2015    CARDIAC CATHETERIZATION  11/26/2019    CORONARY ANGIOPLASTY WITH STENT PLACEMENT  2008    stent to LAD     HAND SURGERY      thumb    HIP HARDWARE REMOVAL      TOTAL HIP ARTHROPLASTY Right     TOTAL HIP ARTHROPLASTY Bilateral        Family History   Problem Relation Age of Onset    Emphysema Father     Emphysema Sister      I have reviewed and agree with the history as documented  E-Cigarette/Vaping    E-Cigarette Use Never User      E-Cigarette/Vaping Substances    Nicotine No     THC No     CBD No     Flavoring No     Other No     Unknown No      Social History     Tobacco Use    Smoking status: Never Smoker    Smokeless tobacco: Never Used   Substance Use Topics    Alcohol use: No    Drug use: No       Review of Systems   Constitutional: Negative for diaphoresis, fatigue and fever  HENT: Negative for congestion, ear pain, nosebleeds and sore throat  Eyes: Negative for photophobia, pain, discharge and visual disturbance  Respiratory: Negative for cough, choking, chest tightness, shortness of breath and wheezing  Cardiovascular: Negative for chest pain and palpitations  Gastrointestinal: Negative for abdominal distention, abdominal pain, diarrhea and vomiting  Genitourinary: Negative for dysuria, flank pain and frequency  Musculoskeletal: Negative for back pain, gait problem and joint swelling  Skin: Negative for color change and rash  Neurological: Negative for dizziness, syncope and headaches  Psychiatric/Behavioral: Negative for behavioral problems and confusion  The patient is not nervous/anxious  All other systems reviewed and are negative  near syncope    Physical Exam  Physical Exam   Constitutional: He is oriented to person, place, and time  He appears well-developed and well-nourished  HENT:   Head: Normocephalic     Right Ear: External ear normal    Left Ear: External ear normal    Nose: Nose normal    Mouth/Throat: Oropharynx is clear and moist    Eyes: Pupils are equal, round, and reactive to light  EOM and lids are normal    Neck: Normal range of motion  Neck supple  Cardiovascular: Normal rate, regular rhythm, normal heart sounds and intact distal pulses  Pulmonary/Chest: Effort normal and breath sounds normal  No respiratory distress  Abdominal: Soft  Bowel sounds are normal    Musculoskeletal: Normal range of motion  He exhibits no deformity  Neurological: He is alert and oriented to person, place, and time  He has normal strength  No cranial nerve deficit or sensory deficit  GCS eye subscore is 4  GCS verbal subscore is 5  GCS motor subscore is 6  Skin: Skin is warm and dry  Psychiatric: He has a normal mood and affect  Nursing note and vitals reviewed      Pulse oximetry normal at 96% adequate oxygenation, there is no hypoxia  Vital Signs  ED Triage Vitals [07/12/20 1046]   Temperature Pulse Respirations Blood Pressure SpO2   97 7 °F (36 5 °C) (!) 50 18 146/77 96 %      Temp Source Heart Rate Source Patient Position - Orthostatic VS BP Location FiO2 (%)   Oral Monitor Lying Right arm --      Pain Score       --           Vitals:    07/12/20 1050 07/12/20 1140 07/12/20 1230 07/12/20 1312   BP: 146/76 143/67 150/83 158/71   Pulse: (!) 47 (!) 45 (!) 46 (!) 48   Patient Position - Orthostatic VS: Lying Lying Lying          Visual Acuity  Visual Acuity      Most Recent Value   L Pupil Size (mm)  2   R Pupil Size (mm)  2          ED Medications  Medications   amLODIPine (NORVASC) tablet 10 mg (has no administration in time range)   aspirin (ECOTRIN LOW STRENGTH) EC tablet 81 mg (has no administration in time range)   clopidogrel (PLAVIX) tablet 75 mg (has no administration in time range)   doxazosin (CARDURA) tablet 8 mg (has no administration in time range)   gabapentin (NEURONTIN) capsule 100 mg (has no administration in time range)   isosorbide dinitrate (ISORDIL) tablet 10 mg (has no administration in time range)   nitroglycerin (NITROSTAT) SL tablet 0 4 mg (has no administration in time range)   oxybutynin (DITROPAN-XL) 24 hr tablet 15 mg (has no administration in time range)   cyanocobalamin (VITAMIN B-12) tablet 1,000 mcg (has no administration in time range)   pyridoxine (VITAMIN B6) tablet 100 mg (has no administration in time range)   sodium chloride 0 9 % bolus 1,000 mL (0 mL Intravenous Stopped 7/12/20 1229)   atropine (FOR EMS ONLY) 1 mg/10 mL injection 1 mg (0 mg Does not apply Given to EMS 7/12/20 1053)       Diagnostic Studies  Results Reviewed     Procedure Component Value Units Date/Time    Fingerstick Glucose (POCT) [[id number]]  (Normal) Collected:  07/12/20 1233    Lab Status:  Final result Updated:  07/12/20 1234     POC Glucose 130 mg/dl     Novel Coronavirus (Covid-19),PCR [facility] [[id number]]  (Normal) Collected:  07/12/20 1104    Lab Status:  Final result Specimen:  Nares from Nose Updated:  07/12/20 1205     SARS-CoV-2 Negative    Narrative: The specimen collection materials, transport medium, and/or testing methodology utilized in the production of these test results have been proven to be reliable in a limited validation with an abbreviated program under the Emergency Utilization Authorization provided by the FDA  Testing reported as "Presumptive positive" will be confirmed with secondary testing with a reference laboratory to ensure result accuracy  Clinical caution and judgement should be used with the interpretation of these results with consideration of the clinical impression and other laboratory testing  Testing reported as "Positive" or "Negative" has been proven to be accurate according to standard laboratory validation requirements  All testing is performed with control materials showing appropriate reactivity at standard intervals        Hepatic function panel [[id number]]  (Abnormal) Collected:  07/12/20 1102    Lab Status:  Final result Specimen:  Blood from Arm, Left Updated:  07/12/20 1139     Total Bilirubin 0 40 mg/dL      Bilirubin, Direct 0 13 mg/dL      Alkaline Phosphatase 47 U/L      AST 20 U/L      ALT 16 U/L      Total Protein 7 2 g/dL      Albumin 3 4 g/dL     NT-BNP PRO [[id number]]  (Abnormal) Collected:  07/12/20 1102    Lab Status:  Final result Specimen:  Blood from Arm, Left Updated:  07/12/20 1139     NT-proBNP 1,654 pg/mL     Troponin I [[id number]]  (Normal) Collected:  07/12/20 1102    Lab Status:  Final result Specimen:  Blood from Arm, Left Updated:  07/12/20 1134     Troponin I 0 03 ng/mL     Basic metabolic panel [[id number]]  (Abnormal) Collected:  07/12/20 1102    Lab Status:  Final result Specimen:  Blood from Arm, Left Updated:  07/12/20 1132     Sodium 139 mmol/L      Potassium 4 5 mmol/L      Chloride 104 mmol/L      CO2 27 mmol/L      ANION GAP 8 mmol/L      BUN 37 mg/dL      Creatinine 2 34 mg/dL      Glucose 162 mg/dL      Calcium 9 0 mg/dL      eGFR 25 ml/min/1 73sq m     Narrative:       [place] guidelines for Chronic Kidney Disease (CKD):     Stage 1 with normal or high GFR (GFR > 90 mL/min/1 73 square meters)    Stage 2 Mild CKD (GFR = 60-89 mL/min/1 73 square meters)    Stage 3A Moderate CKD (GFR = 45-59 mL/min/1 73 square meters)    Stage 3B Moderate CKD (GFR = 30-44 mL/min/1 73 square meters)    Stage 4 Severe CKD (GFR = 15-29 mL/min/1 73 square meters)    Stage 5 End Stage CKD (GFR <15 mL/min/1 73 square meters)  Note: GFR calculation is accurate only with a steady state creatinine    Protime-INR [[id number]]  (Normal) Collected:  07/12/20 1102    Lab Status:  Final result Specimen:  Blood from Arm, Left Updated:  07/12/20 1126     Protime 13 2 seconds      INR 0 99    CBC and differential [[id number]]  (Abnormal) Collected:  07/12/20 1102    Lab Status:  Final result Specimen:  Blood from Arm, Left Updated:  07/12/20 1113     WBC 5 84 Thousand/uL      RBC 3 68 Million/uL      Hemoglobin 11 4 g/dL      Hematocrit 35 2 %      MCV 96 fL      MCH 31 0 pg      MCHC 32 4 g/dL      RDW 12 7 %      MPV 9 9 fL      Platelets 574 Thousands/uL      nRBC 0 /100 WBCs      Neutrophils Relative 65 %      Immat GRANS % 0 %      Lymphocytes Relative 24 %      Monocytes Relative 6 %      Eosinophils Relative 4 %      Basophils Relative 1 %      Neutrophils Absolute 3 81 Thousands/µL      Immature Grans Absolute 0 02 Thousand/uL      Lymphocytes Absolute 1 38 Thousands/µL      Monocytes Absolute 0 34 Thousand/µL      Eosinophils Absolute 0 25 Thousand/µL      Basophils Absolute 0 04 Thousands/µL                  XR chest pa & lateral    (Results Pending)              Procedures  ECG 12 Lead Documentation Only  Date/Time: 7/12/2020 10:56 AM  Performed by: Alfreda Syed MD  Authorized by: Alfreda Syed MD     Indications / Diagnosis:  Near syncope  ECG reviewed by me, the ED Provider: yes    Patient location:  ED  Previous ECG:     Previous ECG:  Compared to current    Comparison ECG info:  Near syncope    Similarity:  Changes noted  Interpretation:     Interpretation: non-specific    Rate:     ECG rate:  Fifty-two    ECG rate assessment: normal    Rhythm:     Rhythm: sinus rhythm    Comments:      Sinus bradycardia, first-degree AV block right bundle-branch block, prior EKG shows also bradycardia from January 30, 2020 the right bundle-branch block, nonspecific ST-T wave changes             ED Course       US AUDIT      Most Recent Value   Initial Alcohol Screen: US AUDIT-C    1  How often do you have a drink containing alcohol?  0 Filed at: 07/12/2020 1047   2  How many drinks containing alcohol do you have on a typical day you are drinking? 0 Filed at: 07/12/2020 1047   3b  FEMALE Any Age, or MALE 65+: How often do you have 4 or more drinks on one occassion?   0 Filed at: 07/12/2020 1047   Audit-C Score  0 Filed at: 07/12/2020 1047              Identification of Seniors at Risk Most Recent Value   (ISAR) Identification of Seniors at Risk   Before the illness or injury that brought you to the Emergency, did you need someone to help you on a regular basis? 0 Filed at: 07/12/2020 1049   In the last 24 hours, have you needed more help than usual?  0 Filed at: 07/12/2020 1049   Have you been hospitalized for one or more nights during the past 6 months? 1 Filed at: 07/12/2020 1049   In general, do you see well?  0 Filed at: 07/12/2020 1049   In general, do you have serious problems with your memory? 0 Filed at: 07/12/2020 1049   Do you take more than three different medications every day? 1 Filed at: 07/12/2020 1049   ISAR Score  2 Filed at: 07/12/2020 1049          MAVERICK/DAST-10      Most Recent Value   How many times in the past year have you    Used an illegal drug or used a prescription medication for non-medical reasons? Never Filed at: 07/12/2020 1047           diagnostic testing showed normal liver functions no sign of hepatic injury, BNP was elevated consistent with some mild congestive heart failure, cardiac troponin was borderline elevated, nonspecific finding at this point patient has no chest pain no sign of cardiac ischemia on EKG  Patient's creatinine was 2 31, up from his prior creatinine, concern for JEANNINE or dehydration with poor perfusion  Kidney function may be increasing his metoprolol therefore causing his heart rate to go down  white count was normal at 5 8 no sign of inflammation  Hemoglobin was normal at 11 no sign of anemia  Chest x-ray: Chest x-ray showed a normal cardiac silhouette, no pneumothorax no infiltrates, No sign of pathology, interpreted by me, I was the primary   while here patient felt somewhat shaky, heart rate was 44, no sign of poor perfusion  Patient has not eaten all day will get a blood sugar in feed the patient  Critical care time 60 minutes                MDM medical decision making 73-year-old male, episode of near-syncope in [facility] this morning presents with severe bradycardia, required atropine by EMS, patient does not require atropine here but reports no symptoms other than feeling somewhat shaky  Patient's heart rate remained in the 40s to 50s here  Patient has a previous EKG showing heart rate of 42  Patient has some increased elevation in his creatinine possible JEANNINE, will require hydration, monitoring of his creatinine, discontinuation of his metoprolol  Patient will require cardiac monitoring to assure he does not need a pacemaker  Discussed with hospitalist service at length          Disposition  Final diagnoses:   Near syncope   Severe sinus bradycardia   Acute kidney injury ([address] )     Time reflects when diagnosis was documented in both MDM as applicable and the Disposition within this note     Time User Action Codes Description Comment    7/12/2020 11:29 AM Larissa Rousseau [R55] Near syncope     7/12/2020 11:30 AM Larissa Pateli Add [R00 1] Severe sinus bradycardia     7/12/2020 12:11 PM Elham Pollard [N17 9] Acute kidney injury [facility])       ED Disposition     ED Disposition Condition Date/Time Comment    Admit Stable Sun Jul 12, 2020 12:11 PM Case was discussed with hospitalist service and the patient's admission status was agreed to be 2 midnights the service of Dr Andres      Follow-up Information    None         Current Discharge Medication List      CONTINUE these medications which have NOT CHANGED    Details   amLODIPine (NORVASC) 10 mg tablet TAKE 1 TABLET BY MOUTH  DAILY  Qty: 90 tablet, Refills: 0    Associated Diagnoses: Essential hypertension      aspirin (ECOTRIN LOW STRENGTH) 81 mg EC tablet 1 tab daily  Qty: 30 tablet, Refills: 4    Associated Diagnoses: Coronary artery disease involving native coronary artery of native heart without angina pectoris      Blood Glucose Monitoring Suppl (ONE TOUCH ULTRA MINI) w/Device KIT by Does not apply route      cholecalciferol (VITAMIN D3) 1,000 units tablet Take 1,000 Units by mouth daily      clopidogrel (PLAVIX) 75 mg tablet TAKE 1 TABLET BY MOUTH  DAILY  Qty: 90 tablet, Refills: 0    Associated Diagnoses: Cerebrovascular accident (CVA), unspecified mechanism ([facility])      doxazosin (CARDURA) 8 MG tablet TAKE 1 TABLET BY MOUTH  DAILY  Qty: 90 tablet, Refills: 2    Associated Diagnoses: Health care maintenance      gabapentin (NEURONTIN) 100 mg capsule TAKE 1 CAPSULE BY MOUTH  TWICE DAILY  Qty: 180 capsule, Refills: 3    Associated Diagnoses: Chronic pain syndrome      glucose blood (ONE TOUCH ULTRA TEST) test strip Test once daily  Qty: 100 each, Refills: 5    Associated Diagnoses: Type 2 diabetes mellitus with complication, without long-term current use of insulin ([facility])      isosorbide dinitrate (ISORDIL) 10 mg tablet Take 1 tablet (10 mg total) by mouth 2 (two) times a day  Qty: 180 tablet, Refills: 3    Associated Diagnoses: Coronary artery disease involving native coronary artery of native heart without angina pectoris      lisinopril (ZESTRIL) 40 mg tablet Take 1 tablet (40 mg total) by mouth daily  Qty: 90 tablet, Refills: 3    Associated Diagnoses: Essential hypertension      metoprolol tartrate (LOPRESSOR) 25 mg tablet Take 1/2 tablet daily  Qty: 90 tablet, Refills: 2    Associated Diagnoses: CAD S/P percutaneous coronary angioplasty      omega-3-acid ethyl esters (LOVAZA) 1 g capsule Take 1,200 mg by mouth daily      oxybutynin (DITROPAN XL) 15 MG 24 hr tablet Take 1 tablet by mouth daily  Refills: 11      pyridoxine (RA VITAMIN B-6) 100 MG tablet Take 1 tablet by mouth daily      vitamin B-12 (CYANOCOBALAMIN) 100 MCG tablet Take 1,000 mcg by mouth daily      nitroglycerin (NITROSTAT) 0 4 mg SL tablet Place 1 tablet (0 4 mg total) under the tongue every 5 (five) minutes as needed for chest pain  Qty: 25 tablet, Refills: 4    Associated Diagnoses: Coronary artery disease involving native coronary artery of native heart with angina pectoris ([facility]) No discharge procedures on file      PDMP Review     None          ED Provider  Electronically Signed by           Gisela Beavers MD  07/12/20 9204

## 2020-07-12 NOTE — ED NOTES
1  CC - near syncope/bradycardia  2  Admission related to injury? - no  3  Orientation status - a/o x's 3  4  Abnormal labs/abnormal focused assessment/vitals - antonio cardia; HR 45-50 bpm  5  Medication/drips - none  6  Last time narcotics given - n/a  7  IV lines/drains/etc - 18 gauge left AC  8  Isolation status - none ([place] neg)  9  Skin - intact  10  Ambulation -? independent  11   ED nurse's name and phone number - Nick Rubio ext  [address] [place] [address], RN  07/12/20 6927

## 2020-07-12 NOTE — ASSESSMENT & PLAN NOTE
Chronic condition  No chest pain or shortness of breath  Continue aspirin and Plavix  Metoprolol is been held due to bradycardia

## 2020-07-12 NOTE — ASSESSMENT & PLAN NOTE
Patient presents with presyncope and heart rate of 30 detected by EMTs  Patient was transported to the hospital and his heart rate is now in high 40s  This is his baseline  Patient was recently seen by his doctor for changed his metoprolol to 12 5 mg b i d  Due to decrease heart rate  Patient has a first-degree AV block or duration of 244 MS  Will monitor on telemetry overnight and consulted Cardiology  Hold metoprolol for now

## 2020-07-12 NOTE — H&P
H&P- Link Risen 1935, 82 y o  male MRN: [id number]    Unit/Bed#: -01 Encounter: [id number]    Primary Care Provider: Elaine Min DO   Date and time admitted to hospital: 7/12/2020 10:39 AM        Symptomatic bradycardia  Assessment & Plan  Patient presents with presyncope and heart rate of 30 detected by EMTs  Patient was transported to the hospital and his heart rate is now in high 40s  This is his baseline  Patient was recently seen by his doctor for changed his metoprolol to 12 5 mg b i d  Due to decrease heart rate  Patient has a first-degree AV block or duration of 244 MS  Will monitor on telemetry overnight and consulted Cardiology  Hold metoprolol for now  Atrial fibrillation ([facility] )  Assessment & Plan  Currently bradycardic  Holding metoprolol  Not on anticoagulation  Stage 3 chronic kidney disease ([facility] )  Assessment & Plan  Chronic condition  Baseline creatinine around 1 4  Hold lisinopril    Acute kidney injury [facility])  Assessment & Plan  Currently worsened creatinine at 2 3  Baseline is around 1 4  Will hydrate with normal saline at 100 cc an hour  Will reassess BMP tomorrow  Hold Ace inhibitors and other nephrotoxic agents  CAD (coronary artery disease)  Assessment & Plan  Chronic condition  No chest pain or shortness of breath  Continue aspirin and Plavix  Metoprolol is been held due to bradycardia  DM2 (diabetes mellitus, type 2) ([facility] )  Assessment & Plan  Lab Results   Component Value Date    HGBA1C 7 2 (A) 01/28/2020       Recent Labs     07/12/20  1233   POCGLU 130       Well controlled based on the most updated hemoglobin A1c from half a year ago  Will continue insulin sliding scale  Blood Sugar Average: Last 72 hrs:  (P) 130    HTN (hypertension)  Assessment & Plan  Blood pressure is controlled  Continue Cardura and Norvasc  Holding lisinopril and Lopressor      * Postural dizziness with presyncope  Assessment & Plan  Most likely due to dehydration  Patient went to [employer] without eating anything this morning  He denies losing consciousness  Noted acute kidney injury, which is supportive dehydration  Will provide hydration  I doubt that his symptoms were due to symptomatic bradycardia  He has been bradycardic at the baseline, however, will monitor on telemetry overnight and hold metoprolol  VTE Prophylaxis: Heparin  / sequential compression device   Code Status:  Full  POLST: POLST form is not discussed and not completed at this time  Discussion with family:  No    Anticipated Length of Stay:  Patient will be admitted on an Inpatient basis with an anticipated length of stay of  at least 2 midnights  Justification for Hospital Stay:  Bradycardia and presyncope    Total Time for Visit, including Counseling / Coordination of Care: 1 hour  Greater than 50% of this total time spent on direct patient counseling and coordination of care  Chief Complaint:   Presyncope    History of Present Illness:    Yessy Pineda is a 82 y o  male who presents with presyncope that occurred earlier today  He was standing in the [employer] and started to feel lightheaded and lowerd himself down, but did not pass out  There was a nurse standing in the front row who came to help him out  Then, they asked EMTS who came over and checked his pulse rate  At that time it was 35s  Patient admits that he did not eat anything this morning  Patient denies dizziness at this time, he denies feeling sick, denies chest pain, shortness of breath, change in urination, stool or abdominal pain  He is not nauseous and wants to eat  Review of Systems:    Review of Systems   Constitutional: Negative for activity change, diaphoresis, fatigue and fever  Respiratory: Negative for cough and shortness of breath  Cardiovascular: Negative for chest pain and leg swelling  Gastrointestinal: Negative for abdominal pain, nausea and vomiting     Genitourinary: Negative for frequency  Musculoskeletal: Negative for back pain  Neurological: Positive for light-headedness (Presyncope)  Negative for dizziness  Psychiatric/Behavioral: Negative for behavioral problems  Past Medical and Surgical History:     Past Medical History:   Diagnosis Date    Acute ST elevation myocardial infarction [facility])     last assessed: 04/15/2016    Aortic valve disorder     Benign prostatic hyperplasia with lower urinary tract symptoms     CAD (coronary artery disease)     Chronic kidney disease     DM2 (diabetes mellitus, type 2) ([address] )     History of colonoscopy     resolved: 05/08/2012    History of transfusion     History of transient cerebral ischemia     HLD (hyperlipidemia)     HTN (hypertension)     Neuralgia        Past Surgical History:   Procedure Laterality Date    CARDIAC CATHETERIZATION      Outcome: successful; last assessed: 02/03/2015    CARDIAC CATHETERIZATION  11/26/2019    CORONARY ANGIOPLASTY WITH STENT PLACEMENT  2008    stent to LAD     HAND SURGERY      thumb    HIP HARDWARE REMOVAL      TOTAL HIP ARTHROPLASTY Right     TOTAL HIP ARTHROPLASTY Bilateral        Meds/Allergies:    Prior to Admission medications    Medication Sig Start Date End Date Taking?  Authorizing Provider   amLODIPine (NORVASC) 10 mg tablet TAKE 1 TABLET BY MOUTH  DAILY 3/29/20  Yes Marichuy York MD   aspirin (ECOTRIN LOW STRENGTH) 81 mg EC tablet 1 tab daily 4/1/19  Yes Soila Batres MD   Blood Glucose Monitoring Suppl (ONE TOUCH ULTRA MINI) w/Device KIT by Does not apply route   Yes Historical Provider, MD   cholecalciferol (VITAMIN D3) 1,000 units tablet Take 1,000 Units by mouth daily   Yes Historical Provider, MD   clopidogrel (PLAVIX) 75 mg tablet TAKE 1 TABLET BY MOUTH  DAILY 6/1/20  Yes Jalyn Aguirre DO   doxazosin (CARDURA) 8 MG tablet TAKE 1 TABLET BY MOUTH  DAILY 6/1/20  Yes Wilfrid Navarro MD   gabapentin (NEURONTIN) 100 mg capsule TAKE 1 CAPSULE BY MOUTH  TWICE DAILY 3/30/20  Yes Óscar Plasencia DO   glucose blood (ONE TOUCH ULTRA TEST) test strip Test once daily 6/12/20  Yes Óscar Plasencia DO   isosorbide dinitrate (ISORDIL) 10 mg tablet Take 1 tablet (10 mg total) by mouth 2 (two) times a day 12/20/19  Yes Óscar Plasencia DO   lisinopril (ZESTRIL) 40 mg tablet Take 1 tablet (40 mg total) by mouth daily 10/25/19  Yes Óscar Plasencia DO   metoprolol tartrate (LOPRESSOR) 25 mg tablet Take 1/2 tablet daily  Patient taking differently: Take 25 mg by mouth daily Take 1/2 tablet daily 6/30/20  Yes Óscar Plasencia DO   ynimy-9-poee ethyl esters (LOVAZA) 1 g capsule Take 1,200 mg by mouth daily   Yes Historical Provider, MD   oxybutynin (DITROPAN XL) 15 MG 24 hr tablet Take 1 tablet by mouth daily 12/11/19  Yes Historical Provider, MD   pyridoxine (RA VITAMIN B-6) 100 MG tablet Take 1 tablet by mouth daily 1/8/18  Yes Historical Provider, MD   vitamin B-12 (CYANOCOBALAMIN) 100 MCG tablet Take 1,000 mcg by mouth daily   Yes Historical Provider, MD   nitroglycerin (NITROSTAT) 0 4 mg SL tablet Place 1 tablet (0 4 mg total) under the tongue every 5 (five) minutes as needed for chest pain 4/24/20   Mary Maxwell MD     I have reviewed home medications using allscripts  Allergies: Allergies   Allergen Reactions    Celecoxib     Hydromorphone     Pravastatin Hives    Statins        Social History:     Marital Status:     Occupation:  Retired  Patient Pre-hospital Living Situation:  Lives at home  Patient Pre-hospital Level of Mobility:  Ambulates without assistance  Patient Pre-hospital Diet Restrictions:  None  Substance Use History:   Social History     Substance and Sexual Activity   Alcohol Use No     Social History     Tobacco Use   Smoking Status Never Smoker   Smokeless Tobacco Never Used     Social History     Substance and Sexual Activity   Drug Use No       Family History:    non-contributory    Physical Exam:     Vitals:   Blood Pressure: 158/71 (07/12/20 1312)  Pulse: (!) 48 (07/12/20 1312)  Temperature: 97 5 °F (36 4 °C) (07/12/20 1312)  Temp Source: Oral (07/12/20 1046)  Respirations: 18 (07/12/20 1312)  Height: 5' 9" (175 3 cm) (07/12/20 1046)  Weight - Scale: 101 kg (222 lb 0 1 oz) (07/12/20 1046)  SpO2: 98 % (07/12/20 1312)    Physical Exam   Constitutional: He appears well-developed and well-nourished  Eyes: Pupils are equal, round, and reactive to light  Neck: Normal range of motion  Cardiovascular: Regular rhythm and normal heart sounds  Bradycardia present  Pulmonary/Chest: Effort normal and breath sounds normal  No respiratory distress  Abdominal: Soft  Bowel sounds are normal  There is no tenderness  Musculoskeletal: He exhibits no edema or deformity  Neurological: He is alert  Skin: Skin is warm  Capillary refill takes less than 2 seconds  No rash noted  No erythema  Psychiatric: He has a normal mood and affect  Additional Data:     Lab Results: I have personally reviewed pertinent reports  Results from last 7 days   Lab Units 07/12/20  1102   WBC Thousand/uL 5 84   HEMOGLOBIN g/dL 11 4*   HEMATOCRIT % 35 2*   PLATELETS Thousands/uL 147*   NEUTROS PCT % 65   LYMPHS PCT % 24   MONOS PCT % 6   EOS PCT % 4     Results from last 7 days   Lab Units 07/12/20  1102   SODIUM mmol/L 139   POTASSIUM mmol/L 4 5   CHLORIDE mmol/L 104   CO2 mmol/L 27   BUN mg/dL 37*   CREATININE mg/dL 2 34*   ANION GAP mmol/L 8   CALCIUM mg/dL 9 0   ALBUMIN g/dL 3 4*   TOTAL BILIRUBIN mg/dL 0 40   ALK PHOS U/L 47   ALT U/L 16   AST U/L 20   GLUCOSE RANDOM mg/dL 162*     Results from last 7 days   Lab Units 07/12/20  1102   INR  0 99     Results from last 7 days   Lab Units 07/12/20  1233   POC GLUCOSE mg/dl 130               Imaging: I have personally reviewed pertinent reports  XR chest pa & lateral   Final Result by Lolly Richards DO (07/12 5356)      No acute cardiopulmonary disease              Workstation performed: [id number]             EKG, Pathology, and Other Studies Reviewed on Admission:   · EKG:  Sinus bradycardia with first-degree AV block    Allscripts / Epic Records Reviewed: Yes     ** Please Note: This note has been constructed using a voice recognition system   **

## 2020-07-12 NOTE — ASSESSMENT & PLAN NOTE
Lab Results   Component Value Date    HGBA1C 7 2 (A) 01/28/2020       Recent Labs     07/12/20  1233   POCGLU 130       Well controlled based on the most updated hemoglobin A1c from half a year ago  Will continue insulin sliding scale        Blood Sugar Average: Last 72 hrs:  (P) 130

## 2020-07-12 NOTE — ED NOTES
Patient transported to [facility] E Replaced by [facility] [address], RN  07/12/20 1600

## 2020-07-12 NOTE — ASSESSMENT & PLAN NOTE
Most likely due to dehydration  Patient went to [employer] without eating anything this morning  He denies losing consciousness  Noted acute kidney injury, which is supportive dehydration  Will provide hydration  I doubt that his symptoms were due to symptomatic bradycardia  He has been bradycardic at the baseline, however, will monitor on telemetry overnight and hold metoprolol

## 2020-07-13 ENCOUNTER — APPOINTMENT (INPATIENT)
Dept: NON INVASIVE DIAGNOSTICS | Facility: HOSPITAL | Age: 85
DRG: 641 | End: 2020-07-13
Payer: MEDICARE

## 2020-07-13 ENCOUNTER — APPOINTMENT (INPATIENT)
Dept: NON INVASIVE DIAGNOSTICS | Facility: HOSPITAL | Age: 85
DRG: 641 | End: 2020-07-13
Attending: INTERNAL MEDICINE
Payer: MEDICARE

## 2020-07-13 VITALS
RESPIRATION RATE: 18 BRPM | HEIGHT: 69 IN | HEART RATE: 47 BPM | SYSTOLIC BLOOD PRESSURE: 149 MMHG | WEIGHT: 222 LBS | OXYGEN SATURATION: 96 % | TEMPERATURE: 98.3 F | BODY MASS INDEX: 32.88 KG/M2 | DIASTOLIC BLOOD PRESSURE: 65 MMHG

## 2020-07-13 DIAGNOSIS — R00.1 BRADYCARDIA: Primary | ICD-10-CM

## 2020-07-13 LAB
ANION GAP SERPL CALCULATED.3IONS-SCNC: 8 MMOL/L (ref 4–13)
ARRHY DURING EX: NORMAL
BASOPHILS # BLD AUTO: 0.04 THOUSANDS/ΜL (ref 0–0.1)
BASOPHILS NFR BLD AUTO: 1 % (ref 0–1)
BUN SERPL-MCNC: 26 MG/DL (ref 5–25)
CALCIUM SERPL-MCNC: 8.6 MG/DL (ref 8.3–10.1)
CHEST PAIN STATEMENT: NORMAL
CHLORIDE SERPL-SCNC: 109 MMOL/L (ref 100–108)
CO2 SERPL-SCNC: 27 MMOL/L (ref 21–32)
CREAT SERPL-MCNC: 1.71 MG/DL (ref 0.6–1.3)
EOSINOPHIL # BLD AUTO: 0.28 THOUSAND/ΜL (ref 0–0.61)
EOSINOPHIL NFR BLD AUTO: 6 % (ref 0–6)
ERYTHROCYTE [DISTWIDTH] IN BLOOD BY AUTOMATED COUNT: 12.8 % (ref 11.6–15.1)
GFR SERPL CREATININE-BSD FRML MDRD: 36 ML/MIN/1.73SQ M
GLUCOSE SERPL-MCNC: 115 MG/DL (ref 65–140)
GLUCOSE SERPL-MCNC: 119 MG/DL (ref 65–140)
GLUCOSE SERPL-MCNC: 166 MG/DL (ref 65–140)
GLUCOSE SERPL-MCNC: 219 MG/DL (ref 65–140)
HCT VFR BLD AUTO: 36.9 % (ref 36.5–49.3)
HGB BLD-MCNC: 11.9 G/DL (ref 12–17)
IMM GRANULOCYTES # BLD AUTO: 0.02 THOUSAND/UL (ref 0–0.2)
IMM GRANULOCYTES NFR BLD AUTO: 0 % (ref 0–2)
LYMPHOCYTES # BLD AUTO: 1.58 THOUSANDS/ΜL (ref 0.6–4.47)
LYMPHOCYTES NFR BLD AUTO: 33 % (ref 14–44)
MAX DIASTOLIC BP: 70 MMHG
MAX HEART RATE: 129 BPM
MAX PREDICTED HEART RATE: 138 BPM
MAX. SYSTOLIC BP: 198 MMHG
MCH RBC QN AUTO: 31 PG (ref 26.8–34.3)
MCHC RBC AUTO-ENTMCNC: 32.2 G/DL (ref 31.4–37.4)
MCV RBC AUTO: 96 FL (ref 82–98)
MONOCYTES # BLD AUTO: 0.35 THOUSAND/ΜL (ref 0.17–1.22)
MONOCYTES NFR BLD AUTO: 7 % (ref 4–12)
NEUTROPHILS # BLD AUTO: 2.56 THOUSANDS/ΜL (ref 1.85–7.62)
NEUTS SEG NFR BLD AUTO: 53 % (ref 43–75)
NRBC BLD AUTO-RTO: 0 /100 WBCS
PLATELET # BLD AUTO: 144 THOUSANDS/UL (ref 149–390)
PMV BLD AUTO: 10 FL (ref 8.9–12.7)
POTASSIUM SERPL-SCNC: 4.3 MMOL/L (ref 3.5–5.3)
PROTOCOL NAME: NORMAL
RBC # BLD AUTO: 3.84 MILLION/UL (ref 3.88–5.62)
SODIUM SERPL-SCNC: 144 MMOL/L (ref 136–145)
TARGET HR FORMULA: NORMAL
TEST INDICATION: NORMAL
TIME IN EXERCISE PHASE: NORMAL
WBC # BLD AUTO: 4.83 THOUSAND/UL (ref 4.31–10.16)

## 2020-07-13 PROCEDURE — 93016 CV STRESS TEST SUPVJ ONLY: CPT

## 2020-07-13 PROCEDURE — 93306 TTE W/DOPPLER COMPLETE: CPT | Performed by: INTERNAL MEDICINE

## 2020-07-13 PROCEDURE — 80048 BASIC METABOLIC PNL TOTAL CA: CPT | Performed by: NURSE PRACTITIONER

## 2020-07-13 PROCEDURE — 82948 REAGENT STRIP/BLOOD GLUCOSE: CPT

## 2020-07-13 PROCEDURE — 97163 PT EVAL HIGH COMPLEX 45 MIN: CPT

## 2020-07-13 PROCEDURE — 93306 TTE W/DOPPLER COMPLETE: CPT

## 2020-07-13 PROCEDURE — 93018 CV STRESS TEST I&R ONLY: CPT

## 2020-07-13 PROCEDURE — 85025 COMPLETE CBC W/AUTO DIFF WBC: CPT | Performed by: NURSE PRACTITIONER

## 2020-07-13 PROCEDURE — 99238 HOSP IP/OBS DSCHRG MGMT 30/<: CPT | Performed by: NURSE PRACTITIONER

## 2020-07-13 PROCEDURE — 93017 CV STRESS TEST TRACING ONLY: CPT

## 2020-07-13 PROCEDURE — 99222 1ST HOSP IP/OBS MODERATE 55: CPT | Performed by: INTERNAL MEDICINE

## 2020-07-13 PROCEDURE — 97166 OT EVAL MOD COMPLEX 45 MIN: CPT

## 2020-07-13 RX ADMIN — ASPIRIN 81 MG: 81 TABLET, COATED ORAL at 09:44

## 2020-07-13 RX ADMIN — OXYBUTYNIN CHLORIDE 15 MG: 5 TABLET, EXTENDED RELEASE ORAL at 09:47

## 2020-07-13 RX ADMIN — ISOSORBIDE DINITRATE 10 MG: 10 TABLET ORAL at 09:44

## 2020-07-13 RX ADMIN — GABAPENTIN 100 MG: 100 CAPSULE ORAL at 09:43

## 2020-07-13 RX ADMIN — CLOPIDOGREL BISULFATE 75 MG: 75 TABLET ORAL at 09:44

## 2020-07-13 RX ADMIN — Medication 100 MG: at 09:43

## 2020-07-13 RX ADMIN — GABAPENTIN 100 MG: 100 CAPSULE ORAL at 17:10

## 2020-07-13 RX ADMIN — DOXAZOSIN 8 MG: 4 TABLET ORAL at 09:43

## 2020-07-13 RX ADMIN — HEPARIN SODIUM 5000 UNITS: 5000 INJECTION INTRAVENOUS; SUBCUTANEOUS at 05:32

## 2020-07-13 RX ADMIN — Medication 1000 MCG: at 09:44

## 2020-07-13 RX ADMIN — HEPARIN SODIUM 5000 UNITS: 5000 INJECTION INTRAVENOUS; SUBCUTANEOUS at 14:04

## 2020-07-13 RX ADMIN — SODIUM CHLORIDE 100 ML/HR: 0.9 INJECTION, SOLUTION INTRAVENOUS at 18:09

## 2020-07-13 RX ADMIN — AMLODIPINE BESYLATE 10 MG: 10 TABLET ORAL at 09:44

## 2020-07-13 RX ADMIN — INSULIN LISPRO 2 UNITS: 100 INJECTION, SOLUTION INTRAVENOUS; SUBCUTANEOUS at 11:45

## 2020-07-13 RX ADMIN — ISOSORBIDE DINITRATE 10 MG: 10 TABLET ORAL at 17:11

## 2020-07-13 RX ADMIN — SODIUM CHLORIDE 100 ML/HR: 0.9 INJECTION, SOLUTION INTRAVENOUS at 07:40

## 2020-07-13 NOTE — ASSESSMENT & PLAN NOTE
· Most likely due to dehydration  Patient went to [employer] without eating anything this morning  JEANNINE jose e supporting dehydration   · Normal saline 100/hour  · He denies losing consciousness  · Noted acute kidney injury, which is supportive dehydration

## 2020-07-13 NOTE — SOCIAL WORK
LOS 1 DAY  RISK OF UNPLANNED READMISSION SCORE 14  30 DAY READMISSION: NO  BUNDLE: NO    Patient waiting stress test and ECHO  Per rounding, if clear patient a tentative discharge  CM available for any discharge needs  No needs identified at this time

## 2020-07-13 NOTE — PLAN OF CARE
Problem: CARDIOVASCULAR - ADULT  Goal: Maintains optimal cardiac output and hemodynamic stability  Description  INTERVENTIONS:  - Monitor I/O, vital signs and rhythm  - Monitor for S/S and trends of decreased cardiac output  - Administer and titrate ordered vasoactive medications to optimize hemodynamic stability  - Assess quality of pulses, skin color and temperature  - Assess for signs of decreased coronary artery perfusion  - Instruct patient to report change in severity of symptoms  Outcome: Progressing  Goal: Absence of cardiac dysrhythmias or at baseline rhythm  Description  INTERVENTIONS:  - Continuous cardiac monitoring, vital signs, obtain 12 lead EKG if ordered  - Administer antiarrhythmic and heart rate control medications as ordered  - Monitor electrolytes and administer replacement therapy as ordered  Outcome: Progressing     Problem: METABOLIC, FLUID AND ELECTROLYTES - ADULT  Goal: Electrolytes maintained within normal limits  Description  INTERVENTIONS:  - Monitor labs and assess patient for signs and symptoms of electrolyte imbalances  - Administer electrolyte replacement as ordered  - Monitor response to electrolyte replacements, including repeat lab results as appropriate  - Instruct patient on fluid and nutrition as appropriate  Outcome: Progressing  Goal: Fluid balance maintained  Description  INTERVENTIONS:  - Monitor labs   - Monitor I/O and WT  - Instruct patient on fluid and nutrition as appropriate  - Assess for signs & symptoms of volume excess or deficit  Outcome: Progressing  Goal: Glucose maintained within target range  Description  INTERVENTIONS:  - Monitor Blood Glucose as ordered  - Assess for signs and symptoms of hyperglycemia and hypoglycemia  - Administer ordered medications to maintain glucose within target range  - Assess nutritional intake and initiate nutrition service referral as needed  Outcome: Progressing     Problem: SAFETY ADULT  Goal: Patient will remain free of falls  Description  INTERVENTIONS:  - Assess patient frequently for physical needs  -  Identify cognitive and physical deficits and behaviors that affect risk of falls    -  [place] fall precautions as indicated by assessment   - Educate patient/family on patient safety including physical limitations  - Instruct patient to call for assistance with activity based on assessment  - Modify environment to reduce risk of injury  - Consider OT/PT consult to assist with strengthening/mobility  Outcome: Progressing  Goal: Maintain or return to baseline ADL function  Description  INTERVENTIONS:  -  Assess patient's ability to carry out ADLs; assess patient's baseline for ADL function and identify physical deficits which impact ability to perform ADLs (bathing, care of mouth/teeth, toileting, grooming, dressing, etc )  - Assess/evaluate cause of self-care deficits   - Assess range of motion  - Assess patient's mobility; develop plan if impaired  - Assess patient's need for assistive devices and provide as appropriate  - Encourage maximum independence but intervene and supervise when necessary  - Involve family in performance of ADLs  - Assess for home care needs following discharge   - Consider OT consult to assist with ADL evaluation and planning for discharge  - Provide patient education as appropriate  Outcome: Progressing  Goal: Maintain or return mobility status to optimal level  Description  INTERVENTIONS:  - Assess patient's baseline mobility status (ambulation, transfers, stairs, etc )    - Identify cognitive and physical deficits and behaviors that affect mobility  - Identify mobility aids required to assist with transfers and/or ambulation (gait belt, sit-to-stand, lift, walker, cane, etc )  - [place] fall precautions as indicated by assessment  - Record patient progress and toleration of activity level on Mobility SBAR; progress patient to next Phase/Stage  - Instruct patient to call for assistance with activity based on assessment  - Consider rehabilitation consult to assist with strengthening/weightbearing, etc   Outcome: Progressing     Problem: DISCHARGE PLANNING  Goal: Discharge to home or other facility with appropriate resources  Description  INTERVENTIONS:  - Identify barriers to discharge w/patient and caregiver  - Arrange for needed discharge resources and transportation as appropriate  - Identify discharge learning needs (meds, wound care, etc )  - Arrange for interpretive services to assist at discharge as needed  - Refer to Case Management Department for coordinating discharge planning if the patient needs post-hospital services based on physician/advanced practitioner order or complex needs related to functional status, cognitive ability, or social support system  Outcome: Progressing     Problem: Knowledge Deficit  Goal: Patient/family/caregiver demonstrates understanding of disease process, treatment plan, medications, and discharge instructions  Description  Complete learning assessment and assess knowledge base  Interventions:  - Provide teaching at level of understanding  - Provide teaching via preferred learning methods  Outcome: Progressing     Problem: Potential for Falls  Goal: Patient will remain free of falls  Description  INTERVENTIONS:  - Assess patient frequently for physical needs  -  Identify cognitive and physical deficits and behaviors that affect risk of falls    -  [place] fall precautions as indicated by assessment   - Educate patient/family on patient safety including physical limitations  - Instruct patient to call for assistance with activity based on assessment  - Modify environment to reduce risk of injury  - Consider OT/PT consult to assist with strengthening/mobility  Outcome: Progressing

## 2020-07-13 NOTE — OCCUPATIONAL THERAPY NOTE
Occupational Therapy Evaluation Note        Patient Name: Gorge Mallory  [id number] Date: 7/13/2020 07/13/20 1001   Note Type   Note type Eval only   Restrictions/Precautions   Weight Bearing Precautions Per Order No   Braces or Orthoses Other (Comment)  (none reported)   Other Precautions Multiple lines; Fall Risk   Pain Assessment   Pain Assessment Tool 0-10   Pain Score No Pain   Home Living   Type of [address] One level; Other (Comment); Stairs to enter with rails; Performs ADLs on one level; Able to live on main level with bedroom/bathroom  (4 DILMA)   Bathroom Shower/Tub Walk-in shower   Bathroom Toilet Standard   Bathroom Equipment Grab bars in [facility] Other (Comment)  (none at baseline)   Prior Function   Level of [place] Independent with ADLs and functional mobility   Lives With Significant other   Receives Help From Other (Comment)  (significant other)   ADL Assistance Independent   IADLs Needs assistance   Falls in the last 6 months 1 to 4  ("about 2")   Vocational Retired   Comments Patient's girlfriend is home 24/7   Lifestyle   Autonomy Pt reports that he drives at baseline and is independent in ADLs  Patient's significant other who lives with him and is home throughout the day performs the IADLs  Patient reports that he mows the lawn and completes the yardwork  Patient lives with his significant other in a one-story home with 4 DILMA     Reciprocal Relationships Supportive girlfriend   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   [address] Patient reports that he doesn't have any leisure interests at this time   ADL   Eating Assistance 6  Modified independent   Grooming Assistance 6  Modified Independent   [address] 6  [address]  [facility] 66  6  Modified independent   [address] [place] [address] Supervision/Setup   Functional Assistance 5  Supervision/Setup   Additional Comments ADL assist levels based on pt's functional performance during OT evaluation   Bed Mobility   Supine to Sit 5  Supervision   Additional items Increased time required   Additional Comments Pt received lying supine in bed upon OT arrival; at end of session: pt seated OOB to recliner chair w/ all needs within reach and pt with verbal understanding to use call bell prior to getting up   Transfers   Sit to Stand 5  Supervision   Additional items Assist x 1; Increased time required;Verbal cues   Stand to Sit 5  Supervision   Additional items Assist x 1; Increased time required;Verbal cues   Toilet transfer 5  Supervision   Additional items Assist x 1; Increased time required;Standard toilet;Verbal cues  (Grab bar use)   Additional Comments Performed w/ no DME   Functional Mobility   Functional Mobility 5  Supervision   Additional items   (no DME)   Balance   Static Sitting Good   Dynamic Sitting Fair +   Static Standing Fair   Dynamic Standing Fair   Ambulatory Fair   Activity Tolerance   Activity Tolerance Patient limited by fatigue   Medical Staff Made Aware  [address]   Nurse Made Aware RN Rob Olivera confirmed pt appropriate for therapy and made aware of session outcomes   RUE Assessment   RUE Assessment WFL  (AROM and BUE strength WFL; shoulder flex: 5/5)   LUE Assessment   LUE Assessment WFL  (AROM and BUE strength WFL; shoulder flex: 5/5)   Hand Function   Gross Motor Coordination Functional   Fine Motor Coordination Impaired   Sensation   Light Touch No apparent deficits   Vision-Basic Assessment   Current Vision Wears glasses only for reading   Cognition   Overall Cognitive Status [facility]   Arousal/Participation Alert; Responsive; Cooperative   Attention Within functional limits   Orientation Level Oriented X4   Memory Within functional limits   Following Commands Follows all commands and directions without difficulty   Comments Pt agreeable to OT evaluation   Assessment   Limitation Decreased ADL status; Decreased Safe judgement during ADL;Decreased endurance;Decreased fine motor control;Decreased self-care trans;Decreased high-level ADLs   Prognosis Good   Assessment Patient is a 82 y o  male seen for OT evaluation s/p admit to [facility] on 7/12/2020 w/Postural dizziness with presyncope  Commorbidities affecting patient's functional performance at time of assessment include: symptomatic bradycardia, atrial fibrillation, stage 3 CKD, acute kidney injury, CAD, diabetes mellitus type 2, and HTN  Orders placed for OT evaluation and treatment  Performed at least two patient identifiers during session including name and wristband  Prior to admission, patient was living with his significant other in a one-story home with 4 DILMA  Patient reports that at baseline, he is independent in ADLs and requires assistance for IADLs from his significant other  Personal factors affecting patient at time of initial evaluation include: steps to enter, difficulty performing ADLs and difficulty performing IADLs  Upon evaluation, patient requires modified independent assist for UB ADLs, supervision and minimal  assist for LB ADLs, transfers and functional ambulation in room and bathroom with close supervision assist, with no DME  Occupational performance is affected by the following deficits: degenerative arthritic joint changes, impaired fine motor coordination, dynamic sit/ stand balance deficit with poor standing tolerance time for self care and functional mobility, decreased activity tolerance, decreased safety awareness and postural control and postural alignment deficit, requiring external assistance to complete transitional movements   Therapist completed expanded review of medical records and additional review of physical, cognitive or psychosocial history, clinical examination identifying 3-5 performance deficits, clinical decision making of a moderate complexity, consistent with moderate complexity level evaluation  Patient to benefit from continued Occupational Therapy treatment while in the hospital to address deficits as defined above and maximize level of functional independence with ADLs and functional mobility  Occupational Performance areas to address include: grooming , bathing/ shower, dressing, toilet hygiene, transfer to all surfaces, functional mobility, community mobility, health maintenance, IADLS: Household maintenance, IADLs: safety procedures, Leisure Participation and Social participation  From OT standpoint, recommendation at time of d/c would be Home with family support and Home OT  Goals   Patient Goals "to get out of here, return home"   Plan   Treatment Interventions ADL retraining;Functional transfer training;UE strengthening/ROM; Endurance training;Patient/family training;Equipment evaluation/education; Fine motor coordination activities; Energy conservation; Activityengagement   Goal Expiration Date 07/27/20   OT Frequency 2-3x/wk   Recommendation   OT Discharge Recommendation Home with skilled therapy  (Home OT)   Barthel Index   Feeding 10   Bathing 0   Grooming Score 5   Dressing Score 10   Bladder Score 10   Bowels Score 10   Toilet Use Score 5   Transfers (Bed/Chair) Score 10   Mobility (Level Surface) Score 0   Stairs Score 0   Barthel Index Score 60   Modified Hunter Scale   Modified Hunter Scale 3     Occupational Therapy Goals:    1- Patient will verbalize and demonstrate use of energy conservation/ deep breathing technique and work simplification skills during functional activity with no verbal cues  2- Patient will verbalize and demonstrate good body mechanics and joint protection techniques during ADLs/ IADLs with no verbal cues  3- Patient will increase OOB/ sitting tolerance to 4-6 hours per day for increased participation in self care and leisure tasks with no s/s of exertion      4- Patient will identify s/s of exertion during ADL and functional mobility with no verbal cues  5- Patient will verbalize/ demonstrate compensatory strategies to recover from exertion with no verbal cues  6-Patient will increase standing tolerance time to 10 minutes with No UE support to complete sink level ADLs @ Mod I level,     7- Patient will increase sitting tolerance at edge of bed to 30 minutes to complete UB ADLs @ Indep  level  8- Patient/ Family will demonstrate competency with UE Home Exercise Program      9- Patient will perform functional transfers at Mod I level with least restrictive AD and good safety awareness 100% of the time      Roger Nelson OTR/L

## 2020-07-13 NOTE — DISCHARGE INSTR - AVS FIRST PAGE
Follow-up with PCP in 1 week  Follow-up with cardiology in 1 week  Come back to the emergency room if condition worsen or recur

## 2020-07-13 NOTE — ASSESSMENT & PLAN NOTE
· Creatinine at 2 3 on asmission, baseline appears 1 4  · 1 71 today   · Continue with normal saline at 100 cc an hour  · Hold Ace inhibitors and other nephrotoxic agents

## 2020-07-13 NOTE — DISCHARGE INSTRUCTIONS
Syncope   WHAT YOU NEED TO KNOW:   Syncope is also called fainting or passing out  Syncope is a sudden, temporary loss of consciousness, followed by a fall from a standing or sitting position  Syncope ranges from not serious to a sign of a more serious condition that needs to be treated  You can control some health conditions that cause syncope  Your healthcare providers can help you create a plan to manage syncope and prevent episodes  DISCHARGE INSTRUCTIONS:   Seek care immediately if:   · You are bleeding because you hit your head when you fainted  · You suddenly have double vision, difficulty speaking, numbness, and cannot move your arms or legs  · You have chest pain and trouble breathing  · You vomit blood or material that looks like coffee grounds  · You see blood in your bowel movement  Contact your healthcare provider if:   · You have new or worsening symptoms  · You have another syncope episode  · You have a headache, fast heartbeat, or feel too dizzy to stand up  · You have questions or concerns about your condition or care  Follow up with your healthcare provider as directed:  Write down your questions so you remember to ask them during your visits  Manage syncope:   · Keep a record of your syncope episodes  Include your symptoms and your activity before and after the episode  The record can help your healthcare provider find the cause of your syncope and help you manage episodes  · Sit or lie down when needed  This includes when you feel dizzy, your throat is getting tight, and your vision changes  Raise your legs above the level of your heart  · Take slow, deep breaths if you start to breathe faster with anxiety or fear  This can help decrease dizziness and the feeling that you might faint  · Check your blood pressure often  This is important if you take medicine to lower your blood pressure   Check your blood pressure when you are lying down and when you are standing  Ask how often to check during the day  Keep a record of your blood pressure numbers  Your healthcare provider may use the record to help plan your treatment  Prevent a syncope episode:   · Move slowly and let yourself get used to one position before you move to another position  This is very important when you change from a lying or sitting position to a standing position  Take some deep breaths before you stand up from a lying position  Stand up slowly  Sudden movements may cause a fainting spell  Sit on the side of the bed or couch for a few minutes before you stand up  If you are on bedrest, try to be upright for about 2 hours each day, or as directed  Do not lock your legs if you are standing for a long period of time  Move your legs and bend your knees to keep blood flowing  · Follow your healthcare provider's recommendations  Your provider may  recommend that you drink more liquids to prevent dehydration  You may also need to have more salt to keep your blood pressure from dropping too low and causing syncope  Your provider will tell you how much liquid and sodium to have each day  · Watch for signs of low blood sugar  These include hunger, nervousness, sweating, and fast or fluttery heartbeats  Talk with your healthcare provider about ways to keep your blood sugar level steady  · Do not strain if you are constipated  You may faint if you strain to have a bowel movement  Walking is the best way to get your bowels moving  Eat foods high in fiber to make it easier to have a bowel movement  Good examples are high-fiber cereals, beans, vegetables, and whole-grain breads  Prune juice may help make bowel movements softer  · Be careful in hot weather  Heat can cause a syncope episode  Limit activity done outside on hot days  Physical activity in hot weather can lead to dehydration  This can cause an episode    © 2017 [employer] Information is for End User's use only and may not be sold, redistributed or otherwise used for commercial purposes  All illustrations and images included in CareNotes® are the copyrighted property of A D A M , Inc  or James Hoyt  The above information is an  only  It is not intended as medical advice for individual conditions or treatments  Talk to your doctor, nurse or pharmacist before following any medical regimen to see if it is safe and effective for you

## 2020-07-13 NOTE — ASSESSMENT & PLAN NOTE
· Chronic condition  · No chest pain or shortness of breath  · Continue aspirin and Plavix  · Metoprolol is been held due to bradycardia

## 2020-07-13 NOTE — SOCIAL WORK
LOS 1 DAY  RISK OF UNPLANNED READMISSION SCORE 14  30 DAY READMISSION: NO  BUNDLE: NO    Per rounding, patient was at [employer] and has a near-syncopal episode complaining of dizziness and feeling sick to his stomach  No identified LOC  At this time, waiting cardio consult to ensure patient is back to baseline  Patient disclosed to staff that he did not eat for over 14 hours prior to episode  CM will follow and assess as needed

## 2020-07-13 NOTE — PLAN OF CARE
Problem: PHYSICAL THERAPY ADULT  Goal: Performs mobility at highest level of function for planned discharge setting  See evaluation for individualized goals  Description  Treatment/Interventions: Functional transfer training, LE strengthening/ROM, Elevations, Therapeutic exercise, Endurance training, Patient/family training, Equipment eval/education, Bed mobility, Gait training  Equipment Recommended: Other (Comment)(none anticipated)       See flowsheet documentation for full assessment, interventions and recommendations  Note:   Prognosis: Good  Problem List: Decreased strength, Decreased endurance, Impaired balance, Decreased mobility  Assessment: Pt is 82 y o  male seen for PT evaluation on 7/13/2020 s/p admit to [facility] on 7/12/2020 w/ Postural dizziness with presyncope  PT consulted to assess pt's functional mobility and d/c needs  Order placed for PT eval and tx, w/ ambulate patient order  Performed at least 2 patient identifiers during session: Name and wristband  Comorbidities affecting pt's physical performance at time of assessment include: acute ST elevation MI, aortic valve disorder, CAD, CKD, DM2, h/o transient cerebral ischemia, HLD, HTN, neuralgia  PTA, pt was independent w/ all functional mobility w/ no AD/DME, ambulates unrestricted distances and all terrain, has 4 DILMA, lives w/ S O  in 1 level home and retired  Personal factors affecting pt at time of IE include: stairs to enter home and positive fall history  Please find objective findings from PT assessment regarding body systems outlined above with impairments and limitations including weakness, impaired balance, decreased endurance, gait deviations, decreased activity tolerance and fall risk, as well as mobility assessment (need for cueing for mobility technique)  The following objective measures performed on IE also reveal limitations: Barthel Index: 60/100 and Modified [place]: 3 (moderate disability)   Pt's clinical presentation is currently unstable/unpredictable seen in pt's presentation of need for input for task focus and mobility technique, on telemetry monitoring and ongoing medical assessment  Pt to benefit from continued PT tx to address deficits as defined above and maximize level of functional independent mobility and consistency  From PT/mobility standpoint, recommendation at time of d/c would be Home PT with family support pending progress in order to facilitate return to PLOF  Barriers to Discharge: None     PT Discharge Recommendation: Home with skilled therapy(home PT)          See flowsheet documentation for full assessment

## 2020-07-13 NOTE — ASSESSMENT & PLAN NOTE
Lab Results   Component Value Date    HGBA1C 7 2 (A) 01/28/2020       Recent Labs     07/12/20  1546 07/12/20  2144 07/13/20  0632 07/13/20  1058   POCGLU 105 128 115 219*       · Well controlled based on the most updated hemoglobin A1c from half a year ago  · Will continue insulin sliding scale        Blood Sugar Average: Last 72 hrs:  (P) 139 4

## 2020-07-13 NOTE — PHYSICAL THERAPY NOTE
Physical Therapy Evaluation     Patient's Name: Mesfin Gomez    Admitting Diagnosis  Severe sinus bradycardia [R00 1]  Syncope [R55]  Near syncope [R55]  Acute kidney injury ([facility] ) [N17 9]    Problem List  Patient Active Problem List   Diagnosis    Stroke ([facility] )    HTN (hypertension)    DM2 (diabetes mellitus, type 2) ([facility] )    HLD (hyperlipidemia)    CAD (coronary artery disease)    Right hip pain    Acute deep vein thrombosis (DVT) of brachial vein of left upper extremity ([place] [address] )    At risk for polypharmacy    Acute kidney injury ([place] [address] )    Encounter for monitoring antiplatelet therapy    Nonrheumatic aortic valve stenosis    Stage 3 chronic kidney disease ([place] [address] )    History of CVA (cerebrovascular accident)    Atrial fibrillation ([place] [address] )    Hemiplegia and 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-dominant side (HCC)    Type 2 diabetes mellitus without complication ([place] [address] )    Type 2 diabetes mellitus with kidney complication, without long-term current use of insulin ([place] [address] )    Poorly-controlled hypertension    Hypertensive kidney disease with stage 3 chronic kidney disease ([facility] )    Other proteinuria    Memory difficulty    Peroneal DVT (deep venous thrombosis), left (HCC)    Vitamin D deficiency    Basilar artery stenosis    Postural dizziness with presyncope    Symptomatic bradycardia       Past Medical History  Past Medical History:   Diagnosis Date    Acute ST elevation myocardial infarction [facility])     last assessed: 04/15/2016    Aortic valve disorder     Benign prostatic hyperplasia with lower urinary tract symptoms     CAD (coronary artery disease)     Chronic kidney disease     DM2 (diabetes mellitus, type 2) ([facility] )     History of colonoscopy     resolved: 05/08/2012    History of transfusion     History of transient cerebral ischemia     HLD (hyperlipidemia)     HTN (hypertension)     Neuralgia        Past Surgical History  Past Surgical History:   Procedure Laterality Date    CARDIAC CATHETERIZATION      Outcome: successful; last assessed: 02/03/2015    CARDIAC CATHETERIZATION  11/26/2019    CORONARY ANGIOPLASTY WITH STENT PLACEMENT  2008    stent to LAD     HAND SURGERY      thumb    HIP HARDWARE REMOVAL      TOTAL HIP ARTHROPLASTY Right     TOTAL HIP ARTHROPLASTY Bilateral           07/13/20 1013   Note Type   Note type Eval only   Pain Assessment   Pain Assessment Tool 0-10   Pain Score No Pain   Home Living   Type of [address] One level; Other (Comment); Stairs to enter with rails; Performs ADLs on one level; Able to live on main level with bedroom/bathroom  (4  DILMA)   Bathroom Shower/Tub Walk-in shower   Bathroom Toilet Standard   Bathroom Equipment Grab bars in [facility]   (none at baseline)   Prior Function   Level of [place] Independent with ADLs and functional mobility   Lives With Significant other   Receives Help From Other (Comment)  (significant other)   ADL Assistance Independent   IADLs Needs assistance   Falls in the last 6 months 1 to 4  ("about 2")   Vocational Retired   Comments Patient's girlfriend is home 24/7   Restrictions/Precautions   [place] Precautions Per Order No   Braces or Orthoses Other (Comment)  (none reported)   Other Precautions Multiple lines; Fall Risk   General   Family/Caregiver Present No   Cognition   Overall Cognitive Status WFL   Arousal/Participation Alert   Orientation Level Oriented X4   Memory Within functional limits   Following Commands Follows all commands and directions without difficulty   Comments pt agreeable to PT evaluation   RUE Assessment   RUE Assessment X  (grossly 3+/5)   LUE Assessment   LUE Assessment X  (grossly 3+/5)   RLE Assessment   RLE Assessment X  (grossly at least 3+/5 assessed c functional mobility)   LLE Assessment   LLE Assessment X  (grossly at least 3+/5 assessed c functional mobility)   Coordination   Movements are Fluid and Coordinated 1   Sensation WFL   Light Touch   RLE Light Touch Grossly intact   LLE Light Touch Grossly intact   Bed Mobility   Supine to Sit 5  Supervision   Transfers   Sit to Stand 5  Supervision   Additional items Assist x 1; Increased time required;Verbal cues   Stand to Sit 5  Supervision   Additional items Assist x 1; Increased time required;Verbal cues   Ambulation/Elevation   Gait pattern Decreased foot clearance; Short stride   Gait Assistance 5  Supervision   Additional items Assist x 1   Assistive Device None   Distance 30'   Stair Management Assistance Not tested   Balance   Static Sitting Good   Dynamic Sitting Fair +   Static Standing Fair   Dynamic Standing Fair   Ambulatory Fair   Endurance Deficit   Endurance Deficit Yes   Activity Tolerance   Activity Tolerance Patient limited by fatigue   Medical Staff Made Aware OT Diana Melendez   Nurse Made Aware RN Ekta Anderson verbalized pt appropriate to see, made aware of session outcome/recs   Assessment   Prognosis Good   Problem List Decreased strength;Decreased endurance; Impaired balance;Decreased mobility   Assessment Pt is 82 y o  male seen for PT evaluation on 7/13/2020 s/p admit to [facility] on 7/12/2020 w/ Postural dizziness with presyncope  PT consulted to assess pt's functional mobility and d/c needs  Order placed for PT eval and tx, w/ ambulate patient order  Performed at least 2 patient identifiers during session: Name and wristband  Comorbidities affecting pt's physical performance at time of assessment include: acute ST elevation MI, aortic valve disorder, CAD, CKD, DM2, h/o transient cerebral ischemia, HLD, HTN, neuralgia  PTA, pt was independent w/ all functional mobility w/ no AD/DME, ambulates unrestricted distances and all terrain, has 4 DILMA, lives w/ S O  in 1 level home and retired  Personal factors affecting pt at time of IE include: stairs to enter home and positive fall history   Please find objective findings from PT assessment regarding body systems outlined above with impairments and limitations including weakness, impaired balance, decreased endurance, gait deviations, decreased activity tolerance and fall risk, as well as mobility assessment (need for cueing for mobility technique)  The following objective measures performed on IE also reveal limitations: Barthel Index: 60/100 and Modified Yaniv: 3 (moderate disability)  Pt's clinical presentation is currently unstable/unpredictable seen in pt's presentation of need for input for task focus and mobility technique, on telemetry monitoring and ongoing medical assessment  Pt to benefit from continued PT tx to address deficits as defined above and maximize level of functional independent mobility and consistency  From PT/mobility standpoint, recommendation at time of d/c would be Home PT with family support pending progress in order to facilitate return to PLOF  Barriers to Discharge None   Goals   Patient Goals "to get out of here, return home"   [facility] Expiration Date 07/23/20   Short Term Goal #1 In 7-10 days: Increase bilateral LE strength 1/2 grade to facilitate independent mobility, Perform all bed mobility tasks modified independent to decrease caregiver burden, Perform all transfers modified independent to improve independence, Ambulate > 150 ft  with least restrictive assistive device modified independent w/o LOB and w/ normalized gait pattern 100% of the time, Navigate 4 stairs modified independent with unilateral handrail to facilitate return to previous living environment, Increase all balance 1/2 grade to decrease risk for falls, Tolerate 4 hr OOB to faciliate upright tolerance, Improve Barthel Index score to 75 or greater to facilitate independence and PT provider will perform functional balance assessment to determine fall risk   PT Treatment Day 0   Plan   Treatment/Interventions Functional transfer training;LE strengthening/ROM; Elevations; Therapeutic exercise; Endurance training;Patient/family training;Equipment eval/education; Bed mobility;Gait training   PT Frequency   (3-5x/wk)   Recommendation   PT Discharge Recommendation Home with skilled therapy  (home PT)   Equipment Recommended Other (Comment)  (none anticipated)   Modified Yaniv Scale   Modified [place] Scale 3   Barthel Index   Feeding 10   Bathing 0   Grooming Score 5   Dressing Score 10   Bladder Score 10   Bowels Score 10   Toilet Use Score 5   Transfers (Bed/Chair) Score 10   Mobility (Level Surface) Score 0   Stairs Score 0   Barthel Index Score 60         Dajuan Bhavya, PT, DPT

## 2020-07-13 NOTE — PLAN OF CARE
Problem: OCCUPATIONAL THERAPY ADULT  Goal: Performs self-care activities at highest level of function for planned discharge setting  See evaluation for individualized goals  Description  Treatment Interventions: ADL retraining, Functional transfer training, UE strengthening/ROM, Endurance training, Patient/family training, Equipment evaluation/education, Fine motor coordination activities, Energy conservation, Activityengagement          See flowsheet documentation for full assessment, interventions and recommendations  Note:   Limitation: Decreased ADL status, Decreased Safe judgement during ADL, Decreased endurance, Decreased fine motor control, Decreased self-care trans, Decreased high-level ADLs  Prognosis: Good  Assessment: Patient is a 82 y o  male seen for OT evaluation s/p admit to [id number] [place] [address] on 7/12/2020 w/Postural dizziness with presyncope  Commorbidities affecting patient's functional performance at time of assessment include: symptomatic bradycardia, atrial fibrillation, stage 3 CKD, acute kidney injury, CAD, diabetes mellitus type 2, and HTN  Orders placed for OT evaluation and treatment  Performed at least two patient identifiers during session including name and wristband  Prior to admission, patient was living with his significant other in a one-story home with 4 DILMA  Patient reports that at baseline, he is independent in ADLs and requires assistance for IADLs from his significant other  Personal factors affecting patient at time of initial evaluation include: steps to enter, difficulty performing ADLs and difficulty performing IADLs  Upon evaluation, patient requires modified independent assist for UB ADLs, supervision and minimal  assist for LB ADLs, transfers and functional ambulation in room and bathroom with close supervision assist, with no DME   Occupational performance is affected by the following deficits: degenerative arthritic joint changes, impaired fine motor coordination, dynamic sit/ stand balance deficit with poor standing tolerance time for self care and functional mobility, decreased activity tolerance, decreased safety awareness and postural control and postural alignment deficit, requiring external assistance to complete transitional movements  Therapist completed  extensive additional review of medical records and additional review of physical, cognitive or psychosocial history, clinical examination identifying 5 or more performance deficits, clinical decision making of a high complexity , consistent with a high complexity level evaluation  Patient to benefit from continued Occupational Therapy treatment while in the hospital to address deficits as defined above and maximize level of functional independence with ADLs and functional mobility  Occupational Performance areas to address include: grooming , bathing/ shower, dressing, toilet hygiene, transfer to all surfaces, functional mobility, community mobility, health maintenance, IADLS: Household maintenance, IADLs: safety procedures, Leisure Participation and Social participation  From OT standpoint, recommendation at time of d/c would be Home with family support and Home OT         OT Discharge Recommendation: Home with skilled therapy(Home OT)

## 2020-07-13 NOTE — QUICK NOTE
Patient's stress test was normal   Discussed personally with Cardiology Dr Roma Zarco  Cleared for discharge  Hold beta-blocker  Loop recorder and follow-up with cardiology as outpatient  Discharge summary to be followed from day provider

## 2020-07-13 NOTE — CONSULTS
Consultation - Cardiology   Mesfin Gomez 82 y o  male MRN: [id number]  Unit/Bed#: -01 Encounter: [id number]  07/13/20  10:14 AM    Assessment/ Plan:  1  Presyncope  -- Presyncopal event likely secondary to volume depletion (had not had a male for several hours and use of antihypertensives)  Sinus bradycardia is longstanding with no significant symptoms, current symptoms are unlikely to be due to sinus bradycardia  2  Sinus bradycardia - HR 40s  3  CAD s/p PCI/stent to mid and distal LAD Jan 2020  4  Hypertension    Plan     Will check chronotropic competence with exercise stress test  echocardiogram to evaluate cardiac function and regional wall motion  Check TSH  Check orthostatic vital signs  continue to hold metoprolol  Continue aspirin and  Plavix  Unclear why patient is not on statins given hx of CAD   Continue cardiac monitoring      History of Present Illness   Physician Requesting Consult: Sky Engle MD  Reason for Consult / Principal Problem:  Presyncope  HPI: Mesfin Gomez is a 82y o  year old male with past medical history significant for CAD status post stent to mid and distal LAD, diabetes mellitus, TIA, hypertension, CKD, sinus bradycardia presented to the emergency room with a presyncopal attack  Cardiology is consulted for bradycardia  Patient mentioned he suddenly started to feel like throwing up and broke out in profuse sweating while sitting in [place]  He tried to get up and go outside but he could not because of dizzy feeling  He did not pass out and somebody help him to lower himself and sitdown  EMS was called  Her rate was found to be in the 30s and BP was stable  Patient did not have anything to eat for 14 hours prior to the incident  Patient also to took all his antihypertensive medications prior to going to [place]  Patient was brought to the emergency room and was found to be in bradycardia  Patient has a known history of bradycardia for several years    He never had any associated symptoms such as lightheadedness, dizziness, presyncope or syncope  He had Holter monitoring March 2020  Sinus bradycardia with average heart rate of 50 and 0 9% burden of PVCs and 3 8% of PACs  No pauses  No symptoms reported  Denies any chest pain  He reports exertional shortness of breath which limits his physical activity  He can walk 10 yd without significant shortness of breath  He denies any palpitations  On presentation to the ED heart rate was in the 40s, blood pressure stable  Troponin negative            Inpatient consult to Cardiology     Performed by  Jesus Zamorano MD     Authorized by Norbert Jose MD              EKG: Sinus bradycardia heart rate 52  , RBBB, non specific T-wave abnormalities     April 2019- echocardiogram EF 60% with no regional wall motion abnormality, concentric hypertrophy      Review of Systems   Constitutional: Negative  HENT: Negative  Eyes: Negative  Respiratory: Positive for shortness of breath  Negative for cough and chest tightness  Cardiovascular: Negative for chest pain, palpitations and leg swelling  Genitourinary: Negative  Musculoskeletal: Positive for back pain  Neurological: Positive for dizziness         Historical Information   Past Medical History:   Diagnosis Date    Acute [facility])     last assessed: 04/15/2016    Aortic valve disorder     Benign prostatic hyperplasia with lower urinary tract symptoms     CAD (coronary artery disease)     Chronic kidney disease     [facility] 2) [facility] )     History of colonoscopy     resolved: 05/08/2012    History of transfusion     History of transient cerebral ischemia     HLD (hyperlipidemia)     HTN (hypertension)     Neuralgia      Past Surgical History:   Procedure Laterality Date    CARDIAC CATHETERIZATION      Outcome: successful; last assessed: 02/03/2015    CARDIAC CATHETERIZATION  11/26/2019  CORONARY ANGIOPLASTY WITH STENT PLACEMENT  2008    stent to LAD     HAND SURGERY      thumb    HIP HARDWARE REMOVAL      TOTAL HIP ARTHROPLASTY Right     TOTAL HIP ARTHROPLASTY Bilateral      Social History     Substance and Sexual Activity   Alcohol Use Never    Frequency: Never    Binge frequency: Never     Social History     Substance and Sexual Activity   Drug Use No     Social History     Tobacco Use   Smoking Status Never Smoker   Smokeless Tobacco Never Used       Family History:   Family History   Problem Relation Age of Onset    Emphysema Father     Emphysema Sister        Meds/Allergies   all current active meds have been reviewed  Allergies   Allergen Reactions    Celecoxib     Hydromorphone     Pravastatin Hives    Statins        Objective   Vitals: Blood pressure 160/69, pulse (!) 40, temperature 98 °F (36 7 °C), resp  rate 17, height 5' 9" (1 753 m), weight 101 kg (222 lb 0 1 oz), SpO2 93 %  , Body mass index is 32 78 kg/m² ,   Orthostatic Blood Pressures      Most Recent Value   Blood Pressure  160/69 filed at 07/13/2020 0724   Patient Position - Orthostatic VS  Lying filed at 07/12/2020 9574          Systolic (97ONO), SRO:797 , Min:143 , PGT:745     Diastolic (94FZB), XLK:84, Min:60, Max:83        Intake/Output Summary (Last 24 hours) at 7/13/2020 1014  Last data filed at 7/13/2020 0820  Gross per 24 hour   Intake 1860 ml   Output 3425 ml   Net -1565 ml       Invasive Devices     Peripheral Intravenous Line            Peripheral IV 07/12/20 Left Antecubital less than 1 day                    Physical Exam:  GEN: Alert and oriented x 3, in no acute distress  Well appearing and well nourished  HEENT: Sclera anicteric, conjunctivae pink, mucous membranes moist  Oropharynx clear  NECK: Supple, no carotid bruits, no significant JVD  HEART: Regular rhythm, normal S1 and S2, 2/6 systolic murmur, clicks, gallops or rubs    LUNGS: Clear to auscultation bilaterally; no wheezes, rales, or rhonchi  No increased work of breathing or signs of respiratory distress  ABDOMEN: Soft, nontender, nondistended, normoactive bowel sounds  EXTREMITIES: Skin warm and well perfused, no clubbing, cyanosis, or edema  NEURO: No focal findings  Normal speech  Mood and affect normal    SKIN: Normal without suspicious lesions on exposed skin        Lab Results:     Troponins:   Results from last 7 days   Lab Units 07/12/20  1102   TROPONIN I ng/mL 0 03       CBC with diff:   Results from last 7 days   Lab Units 07/13/20  0815 07/12/20  1717 07/12/20  1102   WBC Thousand/uL 4 83  --  5 84   HEMOGLOBIN g/dL 11 9*  --  11 4*   HEMATOCRIT % 36 9  --  35 2*   MCV fL 96  --  96   PLATELETS Thousands/uL 144* 140* 147*   MCH pg 31 0  --  31 0   MCHC g/dL 32 2  --  32 4   RDW % 12 8  --  12 7   MPV fL 10 0 10 0 9 9   NRBC AUTO /100 WBCs 0  --  0         CMP:   Results from last 7 days   Lab Units 07/13/20  0815 07/12/20  1102   POTASSIUM mmol/L 4 3 4 5   CHLORIDE mmol/L 109* 104   CO2 mmol/L 27 27   BUN mg/dL 26* 37*   CREATININE mg/dL 1 71* 2 34*   CALCIUM mg/dL 8 6 9 0   AST U/L  --  20   ALT U/L  --  16   ALK PHOS U/L  --  47   EGFR ml/min/1 73sq m 36 25

## 2020-07-14 ENCOUNTER — TELEPHONE (OUTPATIENT)
Dept: INTERNAL MEDICINE CLINIC | Facility: CLINIC | Age: 85
End: 2020-07-14

## 2020-07-14 ENCOUNTER — OFFICE VISIT (OUTPATIENT)
Dept: INTERNAL MEDICINE CLINIC | Facility: CLINIC | Age: 85
End: 2020-07-14
Payer: MEDICARE

## 2020-07-14 ENCOUNTER — TRANSITIONAL CARE MANAGEMENT (OUTPATIENT)
Dept: INTERNAL MEDICINE CLINIC | Facility: CLINIC | Age: 85
End: 2020-07-14

## 2020-07-14 VITALS
WEIGHT: 221.8 LBS | DIASTOLIC BLOOD PRESSURE: 80 MMHG | BODY MASS INDEX: 32.85 KG/M2 | OXYGEN SATURATION: 95 % | HEART RATE: 80 BPM | TEMPERATURE: 98.4 F | HEIGHT: 69 IN | SYSTOLIC BLOOD PRESSURE: 190 MMHG

## 2020-07-14 DIAGNOSIS — I25.118 CORONARY ARTERY DISEASE INVOLVING NATIVE CORONARY ARTERY OF NATIVE HEART WITH OTHER FORM OF ANGINA PECTORIS (HCC): Primary | ICD-10-CM

## 2020-07-14 DIAGNOSIS — I48.91 ATRIAL FIBRILLATION, UNSPECIFIED TYPE (HCC): ICD-10-CM

## 2020-07-14 PROCEDURE — 99214 OFFICE O/P EST MOD 30 MIN: CPT | Performed by: INTERNAL MEDICINE

## 2020-07-14 PROCEDURE — 1160F RVW MEDS BY RX/DR IN RCRD: CPT | Performed by: INTERNAL MEDICINE

## 2020-07-14 PROCEDURE — 4040F PNEUMOC VAC/ADMIN/RCVD: CPT | Performed by: INTERNAL MEDICINE

## 2020-07-14 PROCEDURE — 3008F BODY MASS INDEX DOCD: CPT | Performed by: INTERNAL MEDICINE

## 2020-07-14 PROCEDURE — 3060F POS MICROALBUMINURIA REV: CPT | Performed by: INTERNAL MEDICINE

## 2020-07-14 PROCEDURE — 1036F TOBACCO NON-USER: CPT | Performed by: INTERNAL MEDICINE

## 2020-07-14 PROCEDURE — 1111F DSCHRG MED/CURRENT MED MERGE: CPT | Performed by: INTERNAL MEDICINE

## 2020-07-14 PROCEDURE — 2022F DILAT RTA XM EVC RTNOPTHY: CPT | Performed by: INTERNAL MEDICINE

## 2020-07-14 PROCEDURE — 3066F NEPHROPATHY DOC TX: CPT | Performed by: INTERNAL MEDICINE

## 2020-07-14 PROCEDURE — 3078F DIAST BP <80 MM HG: CPT | Performed by: INTERNAL MEDICINE

## 2020-07-14 PROCEDURE — 3077F SYST BP >= 140 MM HG: CPT | Performed by: INTERNAL MEDICINE

## 2020-07-14 PROCEDURE — 3051F HG A1C>EQUAL 7.0%<8.0%: CPT | Performed by: INTERNAL MEDICINE

## 2020-07-14 NOTE — DISCHARGE SUMMARY
[address] [facility]   Discharge- Cassoday Medal 1935, 82 y o  male MRN: [id number]    Unit/Bed#: -01 Encounter: [id number]    Primary Care Provider: Milton Singh DO   Date and time admitted to hospital: 7/12/2020 10:39 AM        * Postural dizziness with presyncope  Assessment & Plan  · Most likely due to dehydration  Patient went to [employer] without eating anything this morning  JEANNINE likley supporting dehydration   · Normal saline 100/hour  · He denies losing consciousness  · Noted acute kidney injury, which is supportive dehydration  Symptomatic bradycardia  Assessment & Plan  · Patient presents with presyncope and heart rate of 30 detected by EMTs  Upon arrival to Ed rate up to upper 40s which is patient's baseline  · Patient was recently seen by his doctor for changed his metoprolol to 12 5 mg b i d  Due to decreased heart rate  Hold Metoprolol for now  · Patient has a first-degree AV block or duration of 244 MS  · telemetry overnight   · Cardiology  · ECHO, recent 2019 showed EF 60%  · Stress test pending      Atrial fibrillation ([facility] [address] )  Assessment & Plan  · Currently bradycardic  · Holding metoprolol  · Not on anticoagulation  Acute kidney injury ([facility] [address] )  Assessment & Plan  · Creatinine at 2 3 on asmission, baseline appears 1 4  · 1 71 today   · Continue with normal saline at 100 cc an hour  · Hold Ace inhibitors and other nephrotoxic agents  HTN (hypertension)  Assessment & Plan  · Blood pressure is controlled  · Continue Cardura and Norvasc  · Holding lisinopril and Lopressor  CAD (coronary artery disease)  Assessment & Plan  · Chronic condition  · No chest pain or shortness of breath  · Continue aspirin and Plavix  · Metoprolol is been held due to bradycardia      Stage 3 chronic kidney disease (HCC)  Assessment & Plan  · Baseline creatinine around 1 4   · Hold lisinopril    DM2 (diabetes mellitus, type 2) [facility])  Assessment & Plan  Lab Results   Component Value Date HGBA1C 7 2 (A) 01/28/2020       Recent Labs     07/12/20  1546 07/12/20  2144 07/13/20  0632 07/13/20  1058   POCGLU 105 128 115 219*       · Well controlled based on the most updated hemoglobin A1c from half a year ago  · Will continue insulin sliding scale  Blood Sugar Average: Last 72 hrs:  (P) 139 4        Discharging Physician / Practitioner: JALEEL Vines  PCP: Chin Bond DO  Admission Date:   Admission Orders (From admission, onward)     Ordered        07/12/20 1212  Inpatient Admission  Once                   Discharge Date: 07/13/2020    Resolved Problems  Date Reviewed: 7/13/2020    None          Consultations During Hospital Stay:  · cardiology    Procedures Performed:   · none    Significant Findings / Test Results:   · none    Incidental Findings:   · none     Test Results Pending at Discharge (will require follow up):   · none     Outpatient Tests Requested:  · none    Complications:  none    Reason for Admission: presyncope    Hospital Course:     Ijeoma Cantor is a 82 y o  male patient who originally presented to the hospital on 7/12/2020 due to presyncope that occurred while he was standing in [employer] and started to feel lightheaded and lowerd himself down, but did not pass out  There was a nurse standing in the front row who came to help him out  EMS was called and patient was found to have a heart rate in the 30s  Patient admits that he did not eat anything this morning  Heart rate usually on lower side in the 40s  Cardiology was consulted  Patient's beta blocker was held and ECHO and stress test were performed both of which were normal and patient to follow up with outpatient cardiology  Please see above list of diagnoses and related plan for additional information  Condition at Discharge: good     Discharge Day Visit / Exam:     Subjective:  Patient reports no complaints of dizziness or presyncope  States he is feeling back to baseline     Vitals: Blood Pressure: 149/65 (07/13/20 1900)  Pulse: (!) 47 (07/13/20 1900)  Temperature: 98 3 °F (36 8 °C) (07/13/20 1900)  Temp Source: Oral (07/12/20 1700)  Respirations: 18 (07/13/20 1900)  Height: 5' 9" (175 3 cm) (07/12/20 1046)  Weight - Scale: 101 kg (222 lb 0 1 oz) (07/12/20 1046)  SpO2: 96 % (07/13/20 1900)  Exam:   Physical Exam   Constitutional: He is oriented to person, place, and time  He appears well-developed and well-nourished  HENT:   Head: Normocephalic and atraumatic  Eyes: Pupils are equal, round, and reactive to light  EOM are normal    Neck: Normal range of motion  Neck supple  Cardiovascular: Regular rhythm  Bradycardia present  No murmur heard  Pulmonary/Chest: Effort normal and breath sounds normal  No respiratory distress  Abdominal: Soft  Bowel sounds are normal    Musculoskeletal: Normal range of motion  He exhibits no edema  Neurological: He is alert and oriented to person, place, and time  Skin: Skin is warm and dry  Psychiatric: He has a normal mood and affect  His behavior is normal  Judgment and thought content normal        Discussion with Family: none    Discharge instructions/Information to patient and family:   See after visit summary for information provided to patient and family  Provisions for Follow-Up Care:  See after visit summary for information related to follow-up care and any pertinent home health orders  Disposition:     Home    For Discharges to [facility] SNF:   · Not Applicable to this Patient - Not Applicable to this Patient    Planned Readmission: no plan for readmission      Discharge Statement:  I spent 40 minutes discharging the patient  This time was spent on the day of discharge  I had direct contact with the patient on the day of discharge   Greater than 50% of the total time was spent examining patient, answering all patient questions, arranging and discussing plan of care with patient as well as directly providing post-discharge instructions  Additional time then spent on discharge activities  Discharge Medications:  See after visit summary for reconciled discharge medications provided to patient and family        ** Please Note: This note has been constructed using a voice recognition system **

## 2020-07-14 NOTE — TELEPHONE ENCOUNTER
Patient has an appt today w/Dr Chandra Hinkle    Daughter is out of town and said she would be available for Dr to call her during his appt if Dr Chandra Hinkle thinks it's necessary    She does manage her father's medications  Her ph [phone]

## 2020-07-16 ENCOUNTER — TELEPHONE (OUTPATIENT)
Dept: INTERNAL MEDICINE CLINIC | Facility: CLINIC | Age: 85
End: 2020-07-16

## 2020-07-16 NOTE — TELEPHONE ENCOUNTER
COVID Pre-Visit Screening     1  Is this a family member screening? Yes  2  Have you traveled outside of your state in the past 2 weeks? No  3  Do you presently have a fever or flu-like symptoms? No  4  Do you have symptoms of an upper respiratory infection like runny nose, sore throat, or cough? No  5  Are you suffering from new headache that you have not had in the past?  No  6  Do you have/have you experienced any new shortness of breath recently? No  7  Do you have any new diarrhea, nausea or vomiting? No  8  Have you been in contact with anyone who has been sick or diagnosed with COVID-19? No  9  Do you have any new loss of taste or smell? No  10  Are you able to wear a mask without a valve for the entire visit?  Yes    Lay Perez patient's daughter will be coming in with him tomorrow

## 2020-07-17 ENCOUNTER — OFFICE VISIT (OUTPATIENT)
Dept: INTERNAL MEDICINE CLINIC | Facility: CLINIC | Age: 85
End: 2020-07-17
Payer: MEDICARE

## 2020-07-17 VITALS
SYSTOLIC BLOOD PRESSURE: 160 MMHG | BODY MASS INDEX: 32.33 KG/M2 | WEIGHT: 218.3 LBS | TEMPERATURE: 97.2 F | HEART RATE: 48 BPM | DIASTOLIC BLOOD PRESSURE: 68 MMHG | HEIGHT: 69 IN | OXYGEN SATURATION: 95 %

## 2020-07-17 DIAGNOSIS — I48.91 ATRIAL FIBRILLATION, UNSPECIFIED TYPE (HCC): ICD-10-CM

## 2020-07-17 PROCEDURE — 1111F DSCHRG MED/CURRENT MED MERGE: CPT | Performed by: INTERNAL MEDICINE

## 2020-07-17 PROCEDURE — 99214 OFFICE O/P EST MOD 30 MIN: CPT | Performed by: INTERNAL MEDICINE

## 2020-07-17 PROCEDURE — 1036F TOBACCO NON-USER: CPT | Performed by: INTERNAL MEDICINE

## 2020-07-17 PROCEDURE — 1160F RVW MEDS BY RX/DR IN RCRD: CPT | Performed by: INTERNAL MEDICINE

## 2020-07-17 PROCEDURE — 3066F NEPHROPATHY DOC TX: CPT | Performed by: INTERNAL MEDICINE

## 2020-07-17 PROCEDURE — 2022F DILAT RTA XM EVC RTNOPTHY: CPT | Performed by: INTERNAL MEDICINE

## 2020-07-17 PROCEDURE — 4040F PNEUMOC VAC/ADMIN/RCVD: CPT | Performed by: INTERNAL MEDICINE

## 2020-07-17 PROCEDURE — 93000 ELECTROCARDIOGRAM COMPLETE: CPT | Performed by: INTERNAL MEDICINE

## 2020-07-17 PROCEDURE — 3051F HG A1C>EQUAL 7.0%<8.0%: CPT | Performed by: INTERNAL MEDICINE

## 2020-07-17 PROCEDURE — 3078F DIAST BP <80 MM HG: CPT | Performed by: INTERNAL MEDICINE

## 2020-07-17 PROCEDURE — 3008F BODY MASS INDEX DOCD: CPT | Performed by: INTERNAL MEDICINE

## 2020-07-17 PROCEDURE — 3060F POS MICROALBUMINURIA REV: CPT | Performed by: INTERNAL MEDICINE

## 2020-07-17 PROCEDURE — 3077F SYST BP >= 140 MM HG: CPT | Performed by: INTERNAL MEDICINE

## 2020-07-17 NOTE — PROGRESS NOTES
Assessment/Plan:  The patient had sinus bradycardia precipitating hospitalization  He now is in atrial fibrillation with controlled rate  Discussed with Cardiology  Will start Eliquis 2 5 mg b i d     Will discontinue aspirin but continue Plavix  Will recheck in 72 hours  Explained to daughter over the phone the issues with the patient  Will follow-up with cardiology  I will check him on Friday  Risk and benefits of Eliquis explained to family and to patient    1  Coronary artery disease involving native coronary artery of native heart with other form of angina pectoris ([facility] )  POCT ECG   2  Atrial fibrillation, unspecified type (HCC)  apixaban (ELIQUIS) 2 5 mg       Orders Placed This Encounter   Procedures    POCT ECG            Subjective: For cardiac arrhythmia     Patient ID: Hermelinda Romero is a 82 y o  male  HPI he was hospitalized with bradycardia  He had his beta-blocker discontinued  He was discharged and comes in for follow-up  He is no longer dizzy  The following portions of the patient's history were reviewed and updated as appropriate:   He has a past medical history of Acute ST elevation myocardial infarction [facility]), Aortic valve disorder, Benign prostatic hyperplasia with lower urinary tract symptoms, CAD (coronary artery disease), Chronic kidney disease, DM2 (diabetes mellitus, type 2) ([place] [address] ), History of colonoscopy, History of transfusion, History of transient cerebral ischemia, HLD (hyperlipidemia), HTN (hypertension), and Neuralgia ,  does not have any pertinent problems on file  ,   has a past surgical history that includes Total hip arthroplasty (Right); Hip hardware removal; Cardiac catheterization; Coronary angioplasty with stent (2008); Hand surgery; Total hip arthroplasty (Bilateral); and Cardiac catheterization (11/26/2019)  ,  family history includes Emphysema in his father and sister  ,   reports that he has never smoked   He has never used smokeless tobacco  He reports that he does not drink alcohol or use drugs  ,  is allergic to celecoxib; hydromorphone; pravastatin; and statins       Current Outpatient Medications:     amLODIPine (NORVASC) 10 mg tablet, TAKE 1 TABLET BY MOUTH  DAILY, Disp: 90 tablet, Rfl: 0    Blood Glucose Monitoring Suppl (ONE TOUCH ULTRA MINI) w/Device KIT, by Does not apply route, Disp: , Rfl:     cholecalciferol (VITAMIN D3) 1,000 units tablet, Take 1,000 Units by mouth daily, Disp: , Rfl:     clopidogrel (PLAVIX) 75 mg tablet, TAKE 1 TABLET BY MOUTH  DAILY, Disp: 90 tablet, Rfl: 0    doxazosin (CARDURA) 8 MG tablet, TAKE 1 TABLET BY MOUTH  DAILY, Disp: 90 tablet, Rfl: 2    gabapentin (NEURONTIN) 100 mg capsule, TAKE 1 CAPSULE BY MOUTH  TWICE DAILY, Disp: 180 capsule, Rfl: 3    glucose blood (ONE TOUCH ULTRA TEST) test strip, Test once daily, Disp: 100 each, Rfl: 5    isosorbide dinitrate (ISORDIL) 10 mg tablet, Take 1 tablet (10 mg total) by mouth 2 (two) times a day, Disp: 180 tablet, Rfl: 3    lisinopril (ZESTRIL) 40 mg tablet, Take 1 tablet (40 mg total) by mouth daily, Disp: 90 tablet, Rfl: 3    nitroglycerin (NITROSTAT) 0 4 mg SL tablet, Place 1 tablet (0 4 mg total) under the tongue every 5 (five) minutes as needed for chest pain, Disp: 25 tablet, Rfl: 4    omega-3-acid ethyl esters (LOVAZA) 1 g capsule, Take 1,200 mg by mouth daily, Disp: , Rfl:     pyridoxine (RA VITAMIN B-6) 100 MG tablet, Take 1 tablet by mouth daily, Disp: , Rfl:     vitamin B-12 (CYANOCOBALAMIN) 100 MCG tablet, Take 1,000 mcg by mouth daily, Disp: , Rfl:     apixaban (ELIQUIS) 2 5 mg, Take 1 tablet (2 5 mg total) by mouth 2 (two) times a day, Disp: 14 tablet, Rfl: 6    Review of Systems  denies any chest pain pressure squeezing or tightness  Does have some mild dyspnea on exertion  GI is negative for any nausea vomiting or abdominal pain   is negative for any dysuria hematuria or urinary frequency        Objective:  BP (!) 190/80 (BP Location: Left arm, Patient Position: Sitting, Cuff Size: Standard)   Pulse 80   Temp 98 4 °F (36 9 °C)   Ht 5' 9" (1 753 m)   Wt 101 kg (221 lb 12 8 oz)   SpO2 95%   BMI 32 75 kg/m²      Physical Exam  rhythm is irregularly irregular  Blood pressure initially elevated came down to 140/70  Rate is in the 70s  Awake alert  Chest is clear to auscultation  Rhythm is irregularly irregular  Rate controlled  Abdomen is obese soft nontender no masses      Recent Results (from the past 1008 hour(s))   Novel Coronavirus (COVID-19), PCR [facility]    Collection Time: 06/05/20  1:50 PM   Result Value Ref Range    SARS-CoV-2  Not Detected Not Detected   POCT rapid strepA    Collection Time: 06/05/20  2:26 PM   Result Value Ref Range     RAPID STREP A Negative Negative   ECG 12 lead    Collection Time: 07/12/20 10:43 AM   Result Value Ref Range    Ventricular Rate 52 BPM    Atrial Rate 52 BPM    TN Interval 244 ms    QRSD Interval 158 ms    QT Interval 498 ms    QTC Interval 463 ms    P [place] 72 degrees    QRS Axis 76 degrees    T Wave Axis 68 degrees   CBC and differential    Collection Time: 07/12/20 11:02 AM   Result Value Ref Range    WBC 5 84 4 31 - 10 16 Thousand/uL    RBC 3 68 (L) 3 88 - 5 62 Million/uL    Hemoglobin 11 4 (L) 12 0 - 17 0 g/dL    Hematocrit 35 2 (L) 36 5 - 49 3 %    MCV 96 82 - 98 fL    MCH 31 0 26 8 - 34 3 pg    MCHC 32 4 31 4 - 37 4 g/dL    RDW 12 7 11 6 - 15 1 %    MPV 9 9 8 9 - 12 7 fL    Platelets 813 (L) 494 - 390 Thousands/uL    nRBC 0 /100 WBCs    Neutrophils Relative 65 43 - 75 %    Immat GRANS % 0 0 - 2 %    Lymphocytes Relative 24 14 - 44 %    Monocytes Relative 6 4 - 12 %    Eosinophils Relative 4 0 - 6 %    Basophils Relative 1 0 - 1 %    Neutrophils Absolute 3 81 1 85 - 7 62 Thousands/µL    Immature Grans Absolute 0 02 0 00 - 0 20 Thousand/uL    Lymphocytes Absolute 1 38 0 60 - 4 47 Thousands/µL    Monocytes Absolute 0 34 0 17 - 1 22 Thousand/µL    Eosinophils Absolute 0 25 0 00 - 0 61 Thousand/µL Basophils Absolute 0 04 0 00 - 0 10 Thousands/µL   Basic metabolic panel    Collection Time: 07/12/20 11:02 AM   Result Value Ref Range    Sodium 139 136 - 145 mmol/L    Potassium 4 5 3 5 - 5 3 mmol/L    Chloride 104 100 - 108 mmol/L    CO2 27 21 - 32 mmol/L    ANION GAP 8 4 - 13 mmol/L    BUN 37 (H) 5 - 25 mg/dL    Creatinine 2 34 (H) 0 60 - 1 30 mg/dL    Glucose 162 (H) 65 - 140 mg/dL    Calcium 9 0 8 3 - 10 1 mg/dL    eGFR 25 ml/min/1 73sq m   Hepatic function panel    Collection Time: 07/12/20 11:02 AM   Result Value Ref Range    Total Bilirubin 0 40 0 20 - 1 00 mg/dL    Bilirubin, Direct 0 13 0 00 - 0 20 mg/dL    Alkaline Phosphatase 47 46 - 116 U/L    AST 20 5 - 45 U/L    ALT 16 12 - 78 U/L    Total Protein 7 2 6 4 - 8 2 g/dL    Albumin 3 4 (L) 3 5 - 5 0 g/dL   Protime-INR    Collection Time: 07/12/20 11:02 AM   Result Value Ref Range    Protime 13 2 11 6 - 14 5 seconds    INR 0 99 0 84 - 1 19   Troponin I    Collection Time: 07/12/20 11:02 AM   Result Value Ref Range    Troponin I 0 03 <=0 04 ng/mL   NT-BNP PRO    Collection Time: 07/12/20 11:02 AM   Result Value Ref Range    NT-proBNP 1,654 (H) <450 pg/mL   Novel Coronavirus (Covid-19),PCR [facility]    Collection Time: 07/12/20 11:04 AM   Result Value Ref Range    SARS-CoV-2 Negative Negative   Fingerstick Glucose (POCT)    Collection Time: 07/12/20 12:33 PM   Result Value Ref Range    POC Glucose 130 65 - 140 mg/dl   Fingerstick Glucose (POCT)    Collection Time: 07/12/20  3:46 PM   Result Value Ref Range    POC Glucose 105 65 - 140 mg/dl   Platelet count    Collection Time: 07/12/20  5:17 PM   Result Value Ref Range    Platelets 737 (L) 227 - 390 Thousands/uL    MPV 10 0 8 9 - 12 7 fL   Fingerstick Glucose (POCT)    Collection Time: 07/12/20  9:44 PM   Result Value Ref Range    POC Glucose 128 65 - 140 mg/dl   Fingerstick Glucose (POCT)    Collection Time: 07/13/20  6:32 AM   Result Value Ref Range    POC Glucose 115 65 - 140 mg/dl   CBC and differential Collection Time: 07/13/20  8:15 AM   Result Value Ref Range    WBC 4 83 4 31 - 10 16 Thousand/uL    RBC 3 84 (L) 3 88 - 5 62 Million/uL    Hemoglobin 11 9 (L) 12 0 - 17 0 g/dL    Hematocrit 36 9 36 5 - 49 3 %    MCV 96 82 - 98 fL    MCH 31 0 26 8 - 34 3 pg    MCHC 32 2 31 4 - 37 4 g/dL    RDW 12 8 11 6 - 15 1 %    MPV 10 0 8 9 - 12 7 fL    Platelets 202 (L) 976 - 390 Thousands/uL    nRBC 0 /100 WBCs    Neutrophils Relative 53 43 - 75 %    Immat GRANS % 0 0 - 2 %    Lymphocytes Relative 33 14 - 44 %    Monocytes Relative 7 4 - 12 %    Eosinophils Relative 6 0 - 6 %    Basophils Relative 1 0 - 1 %    Neutrophils Absolute 2 56 1 85 - 7 62 Thousands/µL    Immature Grans Absolute 0 02 0 00 - 0 20 Thousand/uL    Lymphocytes Absolute 1 58 0 60 - 4 47 Thousands/µL    Monocytes Absolute 0 35 0 17 - 1 22 Thousand/µL    Eosinophils Absolute 0 28 0 00 - 0 61 Thousand/µL    Basophils Absolute 0 04 0 00 - 0 10 Thousands/µL   Basic metabolic panel    Collection Time: 07/13/20  8:15 AM   Result Value Ref Range    Sodium 144 136 - 145 mmol/L    Potassium 4 3 3 5 - 5 3 mmol/L    Chloride 109 (H) 100 - 108 mmol/L    CO2 27 21 - 32 mmol/L    ANION GAP 8 4 - 13 mmol/L    BUN 26 (H) 5 - 25 mg/dL    Creatinine 1 71 (H) 0 60 - 1 30 mg/dL    Glucose 166 (H) 65 - 140 mg/dL    Calcium 8 6 8 3 - 10 1 mg/dL    eGFR 36 ml/min/1 73sq m   Fingerstick Glucose (POCT)    Collection Time: 07/13/20 10:58 AM   Result Value Ref Range    POC Glucose 219 (H) 65 - 140 mg/dl   Stress strip    Collection Time: 07/13/20  2:43 PM   Result Value Ref Range    Protocol Name [address]     Time In Exercise Phase 00:05:00     MAX   SYSTOLIC  mmHg    Max Diastolic Bp 70 mmHg    Max Heart Rate 129 BPM    Max Predicted Heart Rate 138 BPM    Reason for Termination       Maximal exercise (symptom limited)  Dyspnea/ SOB  Fatigue      Test Indication Syncope     Target Hr Formular (220 - Age)*85%     Arrhy During Ex ventricular premature beats-isolated     ECG Interp Before Ex      ECG Interp during Ex      Ex Summary Comment      Chest Pain Statement none     Overall Hr Response To Exercise      Overall BP Response To Exercise     Fingerstick Glucose (POCT)    Collection Time: 07/13/20  4:36 PM   Result Value Ref Range    POC Glucose 119 65 - 140 mg/dl

## 2020-07-17 NOTE — PROGRESS NOTES
Assessment/Plan:  The patient is now anticoagulated with Eliquis 2 5 mg every 12 hours  I had a lengthy discussion with he and his daughter  He needs to be extremely cautious  He has not had any recent falls  He is advised not to climb any ladders of any height  He is advised to use a cane though he probably will not do that  He is advised to go to the emergency room if any trauma or signs of bleeding  He is going to see cardiology next week  All questions were answered  I will see him back here in 4 5 weeks  1  Atrial fibrillation, unspecified type ([facility] 75 )  apixaban (ELIQUIS) 2 5 mg       No orders of the defined types were placed in this encounter  Subjective:  Atrial fibrillation     Patient ID: Ann Marie Oleary is a 82 y o  male  HPI he returns for follow-up  I saw him 3 days ago  He was in atrial fibrillation with controlled ventricular response  He had previously been in sinus rhythm with bradycardia precipitating hospitalization  He was started on Eliquis 2 5 mg every 12 hours  Feels reasonably well  Denies any chest pain shortness of breath  He has had no falls  He has no bleeding  The following portions of the patient's history were reviewed and updated as appropriate:   He has a past medical history of Acute ST elevation myocardial infarction [facility]), Aortic valve disorder, Benign prostatic hyperplasia with lower urinary tract symptoms, CAD (coronary artery disease), Chronic kidney disease, DM2 (diabetes mellitus, type 2) ([place] [id number] ), History of colonoscopy, History of transfusion, History of transient cerebral ischemia, HLD (hyperlipidemia), HTN (hypertension), and Neuralgia ,  does not have any pertinent problems on file  ,   has a past surgical history that includes Total hip arthroplasty (Right); Hip hardware removal; Cardiac catheterization; Coronary angioplasty with stent (2008); Hand surgery;  Total hip arthroplasty (Bilateral); and Cardiac catheterization (11/26/2019)  ,  family history includes Emphysema in his father and sister  ,   reports that he has never smoked  He has never used smokeless tobacco  He reports that he does not drink alcohol or use drugs  ,  is allergic to celecoxib; hydromorphone; pravastatin; and statins       Current Outpatient Medications:     amLODIPine (NORVASC) 10 mg tablet, TAKE 1 TABLET BY MOUTH  DAILY, Disp: 90 tablet, Rfl: 0    apixaban (ELIQUIS) 2 5 mg, Take 1 tablet (2 5 mg total) by mouth 2 (two) times a day, Disp: 14 tablet, Rfl: 6    Blood Glucose Monitoring Suppl (ONE TOUCH ULTRA MINI) w/Device KIT, by Does not apply route, Disp: , Rfl:     cholecalciferol (VITAMIN D3) 1,000 units tablet, Take 1,000 Units by mouth daily, Disp: , Rfl:     clopidogrel (PLAVIX) 75 mg tablet, TAKE 1 TABLET BY MOUTH  DAILY, Disp: 90 tablet, Rfl: 0    doxazosin (CARDURA) 8 MG tablet, TAKE 1 TABLET BY MOUTH  DAILY, Disp: 90 tablet, Rfl: 2    gabapentin (NEURONTIN) 100 mg capsule, TAKE 1 CAPSULE BY MOUTH  TWICE DAILY, Disp: 180 capsule, Rfl: 3    glucose blood (ONE TOUCH ULTRA TEST) test strip, Test once daily, Disp: 100 each, Rfl: 5    isosorbide dinitrate (ISORDIL) 10 mg tablet, Take 1 tablet (10 mg total) by mouth 2 (two) times a day, Disp: 180 tablet, Rfl: 3    lisinopril (ZESTRIL) 40 mg tablet, Take 1 tablet (40 mg total) by mouth daily, Disp: 90 tablet, Rfl: 3    nitroglycerin (NITROSTAT) 0 4 mg SL tablet, Place 1 tablet (0 4 mg total) under the tongue every 5 (five) minutes as needed for chest pain, Disp: 25 tablet, Rfl: 4    omega-3-acid ethyl esters (LOVAZA) 1 g capsule, Take 1,200 mg by mouth daily, Disp: , Rfl:     pyridoxine (RA VITAMIN B-6) 100 MG tablet, Take 1 tablet by mouth daily, Disp: , Rfl:     vitamin B-12 (CYANOCOBALAMIN) 100 MCG tablet, Take 1,000 mcg by mouth daily, Disp: , Rfl:     Review of Systems   Constitutional: Negative for activity change, appetite change, chills, diaphoresis, fatigue, fever and unexpected weight change  He is overweight  HENT: Positive for hearing loss  Negative for congestion, ear pain, mouth sores, nosebleeds, postnasal drip, sinus pressure, sinus pain, sore throat and trouble swallowing  Eyes: Negative for pain, discharge and visual disturbance  Respiratory: Positive for shortness of breath  Negative for apnea, cough, chest tightness and wheezing  Cardiovascular: Negative for chest pain, palpitations and leg swelling  Gastrointestinal: Negative for abdominal pain, anal bleeding, blood in stool, constipation, diarrhea, nausea and vomiting  He is markedly overweight   Endocrine: Negative for polydipsia and polyphagia  Genitourinary: Negative for decreased urine volume, dysuria, flank pain, frequency, hematuria and urgency  Musculoskeletal: Positive for arthralgias and gait problem  Negative for back pain, joint swelling and myalgias  Skin: Negative for rash and wound  Allergic/Immunologic: Negative for environmental allergies and food allergies  Neurological: Negative for dizziness, tremors, seizures, syncope, speech difficulty, light-headedness, numbness and headaches  Hematological: Negative for adenopathy  Does not bruise/bleed easily  Psychiatric/Behavioral: Negative for agitation, confusion, hallucinations, sleep disturbance and suicidal ideas  The patient is not nervous/anxious  Objective:  /68 (BP Location: Left arm, Patient Position: Sitting, Cuff Size: Standard)   Pulse (!) 48   Temp (!) 97 2 °F (36 2 °C)   Ht 5' 9" (1 753 m)   Wt 99 kg (218 lb 4 8 oz)   SpO2 95%   BMI 32 24 kg/m²      Physical Exam   Constitutional: He appears well-developed  No distress  Elderly overweight male in no acute distress  Pulse is 55  Blood pressure is 142/70  Rhythm is irregularly irregular  O2 saturations 93  HENT:   Head: Normocephalic     Right Ear: External ear normal    Left Ear: External ear normal    Nose: Nose normal    Mouth/Throat: Oropharynx is clear and moist  No oropharyngeal exudate  Eyes: Pupils are equal, round, and reactive to light  Conjunctivae and EOM are normal  Right eye exhibits no discharge  Left eye exhibits no discharge  Neck: Normal range of motion  Neck supple  No thyromegaly present  Cardiovascular: Normal rate and intact distal pulses  Exam reveals no gallop and no friction rub  No murmur heard  Rhythm is irregularly irregular  Rates it approximately 60 blood pressure is 142/70  Pulmonary/Chest: Effort normal and breath sounds normal  No respiratory distress  He has no wheezes  He has no rales  Abdominal: Soft  Bowel sounds are normal  He exhibits no distension and no mass  There is no tenderness  There is no rebound and no guarding  Abdomen is obese soft nontender with no masses   Musculoskeletal: He exhibits no edema, tenderness or deformity  He has generalized osteoarthritis  Lymphadenopathy:     He has no cervical adenopathy  Neurological: He is alert  He has normal reflexes  He displays normal reflexes  No cranial nerve deficit  Coordination normal    Skin: Skin is warm and dry  No rash noted  No erythema  Psychiatric: He has a normal mood and affect  His behavior is normal  Judgment and thought content normal    Nursing note and vitals reviewed          Recent Results (from the past 1008 hour(s))   POCT rapid strepA    Collection Time: 06/05/20  2:26 PM   Result Value Ref Range     RAPID STREP A Negative Negative   ECG 12 lead    Collection Time: 07/12/20 10:43 AM   Result Value Ref Range    Ventricular Rate 52 BPM    Atrial Rate 52 BPM    OR Interval 244 ms    QRSD Interval 158 ms    QT Interval 498 ms    QTC Interval 463 ms    P [place] 72 degrees    QRS Axis 76 degrees    T Wave Axis 68 degrees   CBC and differential    Collection Time: 07/12/20 11:02 AM   Result Value Ref Range    WBC 5 84 4 31 - 10 16 Thousand/uL    RBC 3 68 (L) 3 88 - 5 62 Million/uL    Hemoglobin 11 4 (L) 12 0 - 17 0 g/dL Hematocrit 35 2 (L) 36 5 - 49 3 %    MCV 96 82 - 98 fL    MCH 31 0 26 8 - 34 3 pg    MCHC 32 4 31 4 - 37 4 g/dL    RDW 12 7 11 6 - 15 1 %    MPV 9 9 8 9 - 12 7 fL    Platelets 317 (L) 066 - 390 Thousands/uL    nRBC 0 /100 WBCs    Neutrophils Relative 65 43 - 75 %    Immat GRANS % 0 0 - 2 %    Lymphocytes Relative 24 14 - 44 %    Monocytes Relative 6 4 - 12 %    Eosinophils Relative 4 0 - 6 %    Basophils Relative 1 0 - 1 %    Neutrophils Absolute 3 81 1 85 - 7 62 Thousands/µL    Immature Grans Absolute 0 02 0 00 - 0 20 Thousand/uL    Lymphocytes Absolute 1 38 0 60 - 4 47 Thousands/µL    Monocytes Absolute 0 34 0 17 - 1 22 Thousand/µL    Eosinophils Absolute 0 25 0 00 - 0 61 Thousand/µL    Basophils Absolute 0 04 0 00 - 0 10 Thousands/µL   Basic metabolic panel    Collection Time: 07/12/20 11:02 AM   Result Value Ref Range    Sodium 139 136 - 145 mmol/L    Potassium 4 5 3 5 - 5 3 mmol/L    Chloride 104 100 - 108 mmol/L    CO2 27 21 - 32 mmol/L    ANION GAP 8 4 - 13 mmol/L    BUN 37 (H) 5 - 25 mg/dL    Creatinine 2 34 (H) 0 60 - 1 30 mg/dL    Glucose 162 (H) 65 - 140 mg/dL    Calcium 9 0 8 3 - 10 1 mg/dL    eGFR 25 ml/min/1 73sq m   Hepatic function panel    Collection Time: 07/12/20 11:02 AM   Result Value Ref Range    Total Bilirubin 0 40 0 20 - 1 00 mg/dL    Bilirubin, Direct 0 13 0 00 - 0 20 mg/dL    Alkaline Phosphatase 47 46 - 116 U/L    AST 20 5 - 45 U/L    ALT 16 12 - 78 U/L    Total Protein 7 2 6 4 - 8 2 g/dL    Albumin 3 4 (L) 3 5 - 5 0 g/dL   Protime-INR    Collection Time: 07/12/20 11:02 AM   Result Value Ref Range    Protime 13 2 11 6 - 14 5 seconds    INR 0 99 0 84 - 1 19   Troponin I    Collection Time: 07/12/20 11:02 AM   Result Value Ref Range    Troponin I 0 03 <=0 04 ng/mL   NT-BNP PRO    Collection Time: 07/12/20 11:02 AM   Result Value Ref Range    NT-proBNP 1,654 (H) <450 pg/mL   [facility]ov[facility]    Collection Time: 07/12/20 11:04 AM   Result Value Ref Range    SARS-CoV-2 Negative Negative   Fingerstick Glucose (POCT)    Collection Time: 07/12/20 12:33 PM   Result Value Ref Range    POC Glucose 130 65 - 140 mg/dl   Fingerstick Glucose (POCT)    Collection Time: 07/12/20  3:46 PM   Result Value Ref Range    POC Glucose 105 65 - 140 mg/dl   Platelet count    Collection Time: 07/12/20  5:17 PM   Result Value Ref Range    Platelets 742 (L) 828 - 390 Thousands/uL    MPV 10 0 8 9 - 12 7 fL   Fingerstick Glucose (POCT)    Collection Time: 07/12/20  9:44 PM   Result Value Ref Range    POC Glucose 128 65 - 140 mg/dl   Fingerstick Glucose (POCT)    Collection Time: 07/13/20  6:32 AM   Result Value Ref Range    POC Glucose 115 65 - 140 mg/dl   CBC and differential    Collection Time: 07/13/20  8:15 AM   Result Value Ref Range    WBC 4 83 4 31 - 10 16 Thousand/uL    RBC 3 84 (L) 3 88 - 5 62 Million/uL    Hemoglobin 11 9 (L) 12 0 - 17 0 g/dL    Hematocrit 36 9 36 5 - 49 3 %    MCV 96 82 - 98 fL    MCH 31 0 26 8 - 34 3 pg    MCHC 32 2 31 4 - 37 4 g/dL    RDW 12 8 11 6 - 15 1 %    MPV 10 0 8 9 - 12 7 fL    Platelets 674 (L) 686 - 390 Thousands/uL    nRBC 0 /100 WBCs    Neutrophils Relative 53 43 - 75 %    Immat GRANS % 0 0 - 2 %    Lymphocytes Relative 33 14 - 44 %    Monocytes Relative 7 4 - 12 %    Eosinophils Relative 6 0 - 6 %    Basophils Relative 1 0 - 1 %    Neutrophils Absolute 2 56 1 85 - 7 62 Thousands/µL    Immature Grans Absolute 0 02 0 00 - 0 20 Thousand/uL    Lymphocytes Absolute 1 58 0 60 - 4 47 Thousands/µL    Monocytes Absolute 0 35 0 17 - 1 22 Thousand/µL    Eosinophils Absolute 0 28 0 00 - 0 61 Thousand/µL    Basophils Absolute 0 04 0 00 - 0 10 Thousands/µL   Basic metabolic panel    Collection Time: 07/13/20  8:15 AM   Result Value Ref Range    Sodium 144 136 - 145 mmol/L    Potassium 4 3 3 5 - 5 3 mmol/L    Chloride 109 (H) 100 - 108 mmol/L    CO2 27 21 - 32 mmol/L    ANION GAP 8 4 - 13 mmol/L    BUN 26 (H) 5 - 25 mg/dL    Creatinine 1 71 (H) 0 60 - 1 30 mg/dL    Glucose 166 (H) 65 - 140 mg/dL    Calcium 8 6 8 3 - 10 1 mg/dL    eGFR 36 ml/min/1 73sq m   Fingerstick Glucose (POCT)    Collection Time: 07/13/20 10:58 AM   Result Value Ref Range    POC Glucose 219 (H) 65 - 140 mg/dl   Stress strip    Collection Time: 07/13/20  2:43 PM   Result Value Ref Range    Protocol Name [address]     Time In Exercise Phase 00:05:00     MAX   SYSTOLIC  mmHg    Max Diastolic Bp 70 mmHg    Max Heart Rate 129 BPM    Max Predicted Heart Rate 138 BPM    Reason for Termination       Maximal exercise (symptom limited)  Dyspnea/ SOB  Fatigue      Test Indication Syncope     Target Hr Formular (220 - Age)*85%     Arrhy During Ex ventricular premature beats-isolated     ECG Interp Before Ex      ECG Interp during Ex      Ex Summary Comment      Chest Pain Statement none     Overall Hr Response To Exercise      Overall BP Response To Exercise     Fingerstick Glucose (POCT)    Collection Time: 07/13/20  4:36 PM   Result Value Ref Range    POC Glucose 119 65 - 140 mg/dl

## 2020-07-22 ENCOUNTER — TELEPHONE (OUTPATIENT)
Dept: INTERNAL MEDICINE CLINIC | Facility: CLINIC | Age: 85
End: 2020-07-22

## 2020-07-22 ENCOUNTER — OFFICE VISIT (OUTPATIENT)
Dept: CARDIOLOGY CLINIC | Facility: CLINIC | Age: 85
End: 2020-07-22
Payer: MEDICARE

## 2020-07-22 VITALS
TEMPERATURE: 98.5 F | WEIGHT: 212 LBS | BODY MASS INDEX: 31.4 KG/M2 | HEART RATE: 53 BPM | DIASTOLIC BLOOD PRESSURE: 80 MMHG | OXYGEN SATURATION: 95 % | HEIGHT: 69 IN | SYSTOLIC BLOOD PRESSURE: 158 MMHG

## 2020-07-22 DIAGNOSIS — I25.10 CORONARY ARTERY DISEASE INVOLVING NATIVE CORONARY ARTERY OF NATIVE HEART WITHOUT ANGINA PECTORIS: ICD-10-CM

## 2020-07-22 DIAGNOSIS — I10 ESSENTIAL HYPERTENSION: ICD-10-CM

## 2020-07-22 DIAGNOSIS — I25.10 CORONARY ARTERY DISEASE INVOLVING NATIVE HEART WITHOUT ANGINA PECTORIS, UNSPECIFIED VESSEL OR LESION TYPE: Primary | ICD-10-CM

## 2020-07-22 DIAGNOSIS — I48.0 PAROXYSMAL ATRIAL FIBRILLATION (HCC): ICD-10-CM

## 2020-07-22 DIAGNOSIS — G89.4 CHRONIC PAIN SYNDROME: ICD-10-CM

## 2020-07-22 DIAGNOSIS — I35.0 AORTIC VALVE STENOSIS, ETIOLOGY OF CARDIAC VALVE DISEASE UNSPECIFIED: ICD-10-CM

## 2020-07-22 PROCEDURE — 3066F NEPHROPATHY DOC TX: CPT | Performed by: PHYSICIAN ASSISTANT

## 2020-07-22 PROCEDURE — 99214 OFFICE O/P EST MOD 30 MIN: CPT | Performed by: PHYSICIAN ASSISTANT

## 2020-07-22 PROCEDURE — 1160F RVW MEDS BY RX/DR IN RCRD: CPT | Performed by: PHYSICIAN ASSISTANT

## 2020-07-22 PROCEDURE — 3060F POS MICROALBUMINURIA REV: CPT | Performed by: PHYSICIAN ASSISTANT

## 2020-07-22 PROCEDURE — 3008F BODY MASS INDEX DOCD: CPT | Performed by: PHYSICIAN ASSISTANT

## 2020-07-22 PROCEDURE — 4040F PNEUMOC VAC/ADMIN/RCVD: CPT | Performed by: PHYSICIAN ASSISTANT

## 2020-07-22 PROCEDURE — 3079F DIAST BP 80-89 MM HG: CPT | Performed by: PHYSICIAN ASSISTANT

## 2020-07-22 PROCEDURE — 3077F SYST BP >= 140 MM HG: CPT | Performed by: PHYSICIAN ASSISTANT

## 2020-07-22 PROCEDURE — 2022F DILAT RTA XM EVC RTNOPTHY: CPT | Performed by: PHYSICIAN ASSISTANT

## 2020-07-22 PROCEDURE — 1111F DSCHRG MED/CURRENT MED MERGE: CPT | Performed by: PHYSICIAN ASSISTANT

## 2020-07-22 PROCEDURE — 1036F TOBACCO NON-USER: CPT | Performed by: PHYSICIAN ASSISTANT

## 2020-07-22 PROCEDURE — 3051F HG A1C>EQUAL 7.0%<8.0%: CPT | Performed by: PHYSICIAN ASSISTANT

## 2020-07-22 RX ORDER — ISOSORBIDE DINITRATE 10 MG/1
10 TABLET ORAL 2 TIMES DAILY
Qty: 180 TABLET | Refills: 3 | Status: CANCELLED | OUTPATIENT
Start: 2020-07-22

## 2020-07-22 RX ORDER — GABAPENTIN 100 MG/1
100 CAPSULE ORAL 2 TIMES DAILY
Qty: 180 CAPSULE | Refills: 3 | Status: CANCELLED | OUTPATIENT
Start: 2020-07-22

## 2020-07-22 NOTE — PATIENT INSTRUCTIONS
All questions answered  Reviewed hospital records with patient as well as daughter who was at the bedside  Check Holter monitor to assess for tachy-antonio syndrome, severe bradycardia  Patient advised he may need a pacemaker in the future dependent on Holter monitor and further results  Reviewed echocardiogram and low-level stress test   Reviewed the importance of remaining on Plavix for the next 1 year without interruption  Reviewed the importance of being on Eliquis and Plavix, reviewed the importance of safety  Report any bleeding, blood in the urine, dark black stools  Follow closely with primary care physician  Continue all medications  Previous studies reviewed with patient, medications reviewed and possible side effects discussed  Continue risk factor modification  Optimize weight, regular exercise and follow up with appropriate specialists and primary care physician as discussed  All questions answered  Patient advised to report any problems prompting to medical attention   Return for follow up visit in  2 months or earlier if needed Patient stated that she has pain on R foot

## 2020-07-22 NOTE — PROGRESS NOTES
[facility] [address] [facility] [id number]  Cardiology Follow Up    Jason You  1935  [id number]    1  Coronary artery disease involving native heart without angina pectoris, unspecified vessel or lesion type     2  Paroxysmal atrial fibrillation (HCC)  apixaban (ELIQUIS) 2 5 mg    Holter monitor - 24 hour   3  Essential hypertension     4  Aortic valve stenosis, etiology of cardiac valve disease unspecified         Discussion/Summary:  1-  CAD status post drug-eluting stent to mid and distal LAD January 2020  Continue Plavix, lisinopril, Isordil, amlodipine  No statins as patient is   Allergic  Reviewed the importance of remaining on Plavix for the next 1 year without interruption  Report any bleeding  2-  Paroxysmal atrial fibrillation, on the slower side  Will check Holter monitor to assess for tachy-antonio syndrome, severe bradycardia  Briefly discussed patient may need a pacemaker in the future  Continue with low-dose Eliquis  3-  Hypertension, elevated at today's visit 158/80 but patient states he is not taking his blood pressure pills yet, amlodipine 10, Isordil 10 b i d , lisinopril 40  Patient advised to monitor blood pressure at home  4-  Aortic stenosis, mild-to-moderate noted on echocardiogram   Patient advised to stay well hydrated  Report any passing-out spells, chest pain, worsened shortness of breath  All questions answered  Reviewed hospital records with patient as well as daughter who was at the bedside  Check Holter monitor to assess for tachy-antonio syndrome, severe bradycardia  Patient advised he may need a pacemaker in the future dependent on Holter monitor and further results  Reviewed echocardiogram and low-level stress test   Reviewed the importance of remaining on Plavix for the next 1 year without interruption  Reviewed the importance of being on Eliquis and Plavix, reviewed the importance of safety    Report any bleeding, blood in the urine, dark black stools  Follow closely with primary care physician  Continue all medications  Previous studies reviewed with patient, medications reviewed and possible side effects discussed  Continue risk factor modification  Optimize weight, regular exercise and follow up with appropriate specialists and primary care physician as discussed  All questions answered  Patient advised to report any problems prompting to medical attention  Return for follow up visit in  2 months or earlier if needed    Chief Complaint   Patient presents with    Follow-up       Interval History:  Patient presents for follow-up visit after recent hospitalization  Patient had gone to the hospital after a presyncopal episode while at [facility]  Patient states he was sitting there, felt like he was going to be sick, broken to a profuse sweat  He tried to get up but could not because he felt so dizzy  He did not pass out but was close to it, someone helped him to the ground to sit down  EMS was called, his heart rate was found to be in the 30s but blood pressure was stable  He noted he had not be anything for 14 hours prior  He also took his blood pressure pills prior to going to [facility]  Patient was brought to the emergency room and found to be quite bradycardic  Patient has a history of bradycardia,   Was taken off of metoprolol during hospitalization  Patient had a Holter monitor in March 2020 which showed sinus bradycardia for 19 in the 1/2 hours  Patient had echocardiogram done during hospitalization showed EF of 55%,  Grade 2 diastolic dysfunction and mild to moderate aortic stenosis  Patient also had low-level stress test to check for chronotropic incompetence, found to have good response to exercise  Patient was discharged home  Patient states since being home he has been feeling well    Followed up with primary care physician and noted to be in atrial fibrillation, new onset with controlled ventricular response  Patient was started on low-dose Eliquis 2 5 b i d  As patient also has history of CAD with stent January 2020  Patient states he is feeling well  He is being extra careful now that he is on Plavix and Eliquis  He admits to chronic shortness of breath but states it is no worse than usual   He denies any chest pain, chest pressure, chest heaviness  He also denies any palpitations, dizziness  Patient had not taken any medications prior to today's visit      Patient Active Problem List   Diagnosis    Stroke ([facility] )    HTN (hypertension)    DM2 (diabetes mellitus, type 2) ([facility] )    HLD (hyperlipidemia)    CAD (coronary artery disease)    Right hip pain    Acute deep vein thrombosis (DVT) of brachial vein of left upper extremity ([facility])    At risk for polypharmacy    Acute kidney injury ([facility] )    Encounter for monitoring antiplatelet therapy    Nonrheumatic aortic valve stenosis    Stage 3 chronic kidney disease ([facility] )    History of CVA (cerebrovascular accident)    Atrial fibrillation ([facility])    Hemiplegia and 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-dominant side ([facility])    Type 2 diabetes mellitus without complication ([facility] )    Type 2 diabetes mellitus with kidney complication, without long-term current use of insulin ([facility] )    Poorly-controlled hypertension    Hypertensive kidney disease with stage 3 chronic kidney disease ([facility] )    Other proteinuria    Memory difficulty    Peroneal DVT (deep venous thrombosis), left (HCC)    Vitamin D deficiency    Basilar artery stenosis    Postural dizziness with presyncope    Symptomatic bradycardia     Past Medical History:   Diagnosis Date    Acute ST [facility])     last assessed: 04/15/2016    Aortic valve disorder     Benign prostatic hyperplasia with lower urinary tract symptoms     CAD (coronary artery disease)     Chronic kidney disease     DM2 (diabetes mellitus, type 2) ([facility] )     History of colonoscopy     resolved: 05/08/2012    History of transfusion     History of transient cerebral ischemia     HLD (hyperlipidemia)     HTN (hypertension)     Neuralgia      Social History     Socioeconomic History    Marital status:       Spouse name: Not on file    Number of children: Not on file    Years of education: Not on file    Highest education level: Not on file   Occupational History    Not on file   Social Needs    Financial resource strain: Not on file    Food insecurity:     Worry: Not on file     Inability: Not on file    Transportation needs:     Medical: Not on file     Non-medical: Not on file   Tobacco Use    Smoking status: Never Smoker    Smokeless tobacco: Never Used   Substance and Sexual Activity    Alcohol use: Never     Frequency: Never     Binge frequency: Never    Drug use: No    Sexual activity: Not Currently     Partners: Female     Birth control/protection: None   Lifestyle    Physical activity:     Days per week: 0 days     Minutes per session: 0 min    Stress: Not at all   Relationships    Social connections:     Talks on phone: Not on file     Gets together: Not on file     Attends [employer] service: Not on file     Active member of club or organization: Not on file     Attends meetings of clubs or organizations: Not on file     Relationship status: Not on file    Intimate partner violence:     Fear of current or ex partner: Not on file     Emotionally abused: Not on file     Physically abused: Not on file     Forced sexual activity: Not on file   Other Topics Concern    Not on file   Social History Narrative    Active advance directive (as per [employer])     No advance directive on file (as per [employer])       Family History   Problem Relation Age of Onset    Emphysema Father     Emphysema Sister      Past Surgical History:   Procedure Laterality Date    CARDIAC CATHETERIZATION      Outcome: successful; last assessed: 02/03/2015   Gay Horta CARDIAC CATHETERIZATION  11/26/2019    CORONARY ANGIOPLASTY WITH STENT PLACEMENT  2008    stent to LAD     HAND SURGERY      thumb    HIP HARDWARE REMOVAL      TOTAL HIP ARTHROPLASTY Right     TOTAL HIP ARTHROPLASTY Bilateral        Current Outpatient Medications:     amLODIPine (NORVASC) 10 mg tablet, TAKE 1 TABLET BY MOUTH  DAILY, Disp: 90 tablet, Rfl: 0    apixaban (ELIQUIS) 2 5 mg, Take 1 tablet (2 5 mg total) by mouth 2 (two) times a day, Disp: 180 tablet, Rfl: 3    Blood Glucose Monitoring Suppl (ONE TOUCH ULTRA MINI) w/Device KIT, by Does not apply route, Disp: , Rfl:     cholecalciferol (VITAMIN D3) 1,000 units tablet, Take 1,000 Units by mouth daily, Disp: , Rfl:     clopidogrel (PLAVIX) 75 mg tablet, TAKE 1 TABLET BY MOUTH  DAILY, Disp: 90 tablet, Rfl: 0    doxazosin (CARDURA) 8 MG tablet, TAKE 1 TABLET BY MOUTH  DAILY, Disp: 90 tablet, Rfl: 2    gabapentin (NEURONTIN) 100 mg capsule, TAKE 1 CAPSULE BY MOUTH  TWICE DAILY, Disp: 180 capsule, Rfl: 3    glucose blood (ONE TOUCH ULTRA TEST) test strip, Test once daily, Disp: 100 each, Rfl: 5    isosorbide dinitrate (ISORDIL) 10 mg tablet, Take 1 tablet (10 mg total) by mouth 2 (two) times a day, Disp: 180 tablet, Rfl: 3    lisinopril (ZESTRIL) 40 mg tablet, Take 1 tablet (40 mg total) by mouth daily, Disp: 90 tablet, Rfl: 3    nitroglycerin (NITROSTAT) 0 4 mg SL tablet, Place 1 tablet (0 4 mg total) under the tongue every 5 (five) minutes as needed for chest pain, Disp: 25 tablet, Rfl: 4    omega-3-acid ethyl esters (LOVAZA) 1 g capsule, Take 1,200 mg by mouth daily, Disp: , Rfl:     pyridoxine (RA VITAMIN B-6) 100 MG tablet, Take 1 tablet by mouth daily, Disp: , Rfl:     vitamin B-12 (CYANOCOBALAMIN) 100 MCG tablet, Take 1,000 mcg by mouth daily, Disp: , Rfl:   Allergies   Allergen Reactions    Celecoxib     Hydromorphone     Pravastatin Hives    Statins          Imaging: Xr Chest Pa & Lateral    Result Date: 7/12/2020  Narrative: CHEST INDICATION:   Bradycardia near-syncope  COMPARISON:  6/5/2020 EXAM PERFORMED/VIEWS:  XR CHEST PA & LATERAL Images: 3 FINDINGS: Stable cardiomediastinal structures  The lungs are clear  No pneumothorax or pleural effusion  Osseous structures appear within normal limits for patient age  Impression: No acute cardiopulmonary disease  Workstation performed: [id number]       Review of Systems:  Review of Systems   Constitutional: Negative  Respiratory: Positive for shortness of breath  Chronic, unchanged   Cardiovascular: Negative  Neurological: Negative  Hematological: Negative  Psychiatric/Behavioral: Negative  All other systems reviewed and are negative  /80 (BP Location: Right arm, Patient Position: Sitting, Cuff Size: Adult)   Pulse (!) 53   Temp 98 5 °F (36 9 °C)   Ht 5' 9" (1 753 m)   Wt 96 2 kg (212 lb)   SpO2 95%   BMI 31 31 kg/m²     Physical Exam:  Physical Exam   Constitutional: He is oriented to person, place, and time  He appears well-developed and well-nourished  HENT:   Head: Normocephalic and atraumatic  Eyes: Pupils are equal, round, and reactive to light  EOM are normal    Neck: Normal range of motion  Neck supple  Cardiovascular:   Murmur heard  Irregularly irregular bradycardic   Pulmonary/Chest: Effort normal and breath sounds normal    Musculoskeletal: Normal range of motion  He exhibits edema  Trace edema   well healing scratches noted on right lower extremity   Neurological: He is alert and oriented to person, place, and time  Skin: Skin is warm and dry  Psychiatric: He has a normal mood and affect  His behavior is normal  Judgment and thought content normal    Nursing note and vitals reviewed

## 2020-07-22 NOTE — TELEPHONE ENCOUNTER
Patient's daughter came in requesting a 30 day supply of     gabapentin (NEURONTIN) 100 mg capsule     isosorbide dinitrate (ISORDIL) 10 mg tablet [      Please send to [facility] in [place]    They are usually a 90 day supply through [employer] but they are having issues with the credit card and they need an emergency supply for now      Questions please contact Eden Keith at  [phone]  (on [employer])

## 2020-07-22 NOTE — PHYSICIAN ADVISOR
Current patient class: Inpatient  The patient is currently on Hospital Day: 2 at [address]      The patient was admitted to the hospital  on 7/12/20 at [phone] for the following diagnosis:  Severe sinus bradycardia [R00 1]  Syncope [R55]  Near syncope [R55]  Acute kidney injury ([facility] ) [N17 9]     After review of the relevant documentation, labs, vital signs and test results, this is a PROVIDER LIABLE case  In this particular case the patient was admitted to the hospital as an inpatient  The patient however failed to satisfy the 2 midnight benchmark and closer scrutiny of the case was warranted  After review of the patient presentation and relevant labs the patient was most appropriate for observation or outpatient class at the time of admission  Given that this patient has already been discharged prior to this review they become a provider liable case  Rationale is as follows: The patient is an 28-year-old male who was evaluated in the hospital because of an episode of presyncope with bradycardia  He was reported to have bradycardia at baseline and his metoprolol dose was recently adjusted  In the emergency department, he denied dizziness or other complaints  The plan was to monitor on telemetry overnight and consult Cardiology  Echocardiogram and stress test were performed and considered unremarkable and the patient was stable for discharge after one midnight to follow-up with cardiology in the outpatient setting  Given his length of stay and lack of symptoms at the time of admission, observation class was appropriate to determine if he would require further hospitalization based on the test results and Cardiology input      The patients vitals on arrival were ED Triage Vitals   Temperature Pulse Respirations Blood Pressure SpO2   07/12/20 1046 07/12/20 1046 07/12/20 1046 07/12/20 1046 07/12/20 1046   97 7 °F (36 5 °C) (!) 50 18 146/77 96 %      Temp Source Heart Rate Source Patient Position - Orthostatic VS BP Location FiO2 (%)   07/12/20 1046 07/12/20 1046 07/12/20 1046 07/12/20 1046 --   Oral Monitor Lying Right arm       Pain Score       07/12/20 1500       No Pain           Past Medical History:   Diagnosis Date    Acute ST elevation [facility])     last assessed: 04/15/2016    Aortic valve disorder     Benign prostatic hyperplasia with lower urinary tract symptoms     CAD (coronary artery disease)     Chronic kidney disease     DM2 (diabetes mellitus, type 2) ([facility] )     History of colonoscopy     resolved: 05/08/2012    History of transfusion     History of transient cerebral ischemia     HLD (hyperlipidemia)     HTN (hypertension)     Neuralgia      Past Surgical History:   Procedure Laterality Date    CARDIAC CATHETERIZATION      Outcome: successful; last assessed: 02/03/2015    CARDIAC CATHETERIZATION  11/26/2019    CORONARY ANGIOPLASTY WITH STENT PLACEMENT  2008    stent to LAD     HAND SURGERY      thumb    HIP HARDWARE REMOVAL      TOTAL HIP ARTHROPLASTY Right     TOTAL HIP ARTHROPLASTY Bilateral            Consults have been placed to:   IP CONSULT TO CARDIOLOGY    Vitals:    07/13/20 0316 07/13/20 0724 07/13/20 1556 07/13/20 1900   BP: 148/60 160/69 164/75 149/65   Pulse: (!) 39 (!) 40 (!) 49 (!) 47   Resp: 18 17 18 18   Temp: 98 1 °F (36 7 °C) 98 °F (36 7 °C) (!) 97 3 °F (36 3 °C) 98 3 °F (36 8 °C)   TempSrc:       SpO2: 95% 93% 95% 96%   Weight:       Height:           Most recent labs:    No results for input(s): WBC, HGB, HCT, PLT, K, NA, CALCIUM, BUN, CREATININE, LIPASE, AMYLASE, INR, TROPONINI, CKTOTAL, AST, ALT, ALKPHOS, BILITOT in the last 72 hours  Scheduled Meds:  Continuous Infusions:  No current facility-administered medications for this encounter  PRN Meds:      Surgical procedures (if appropriate):

## 2020-07-23 ENCOUNTER — TELEPHONE (OUTPATIENT)
Dept: CARDIOLOGY CLINIC | Facility: CLINIC | Age: 85
End: 2020-07-23

## 2020-07-23 DIAGNOSIS — G89.4 CHRONIC PAIN SYNDROME: ICD-10-CM

## 2020-07-23 DIAGNOSIS — I25.10 CORONARY ARTERY DISEASE INVOLVING NATIVE CORONARY ARTERY OF NATIVE HEART WITHOUT ANGINA PECTORIS: ICD-10-CM

## 2020-07-23 RX ORDER — GABAPENTIN 100 MG/1
100 CAPSULE ORAL 2 TIMES DAILY
Qty: 60 CAPSULE | Refills: 0 | Status: SHIPPED | OUTPATIENT
Start: 2020-07-23 | End: 2020-08-31 | Stop reason: SDUPTHER

## 2020-07-23 RX ORDER — ISOSORBIDE DINITRATE 10 MG/1
10 TABLET ORAL 2 TIMES DAILY
Qty: 60 TABLET | Refills: 0 | Status: SHIPPED | OUTPATIENT
Start: 2020-07-23 | End: 2020-08-31 | Stop reason: SDUPTHER

## 2020-07-23 NOTE — TELEPHONE ENCOUNTER
dAughter came to 46  Patient was here yesterday   She looked at his AVS and saw Michaeleen Purpura was not on it  Daughter said he should be taking it  When did it get removed?  pls call her ASAP

## 2020-07-24 ENCOUNTER — HOSPITAL ENCOUNTER (OUTPATIENT)
Dept: NON INVASIVE DIAGNOSTICS | Facility: CLINIC | Age: 85
Discharge: HOME | End: 2020-07-24
Payer: MEDICARE

## 2020-07-24 DIAGNOSIS — I48.0 PAROXYSMAL ATRIAL FIBRILLATION (HCC): ICD-10-CM

## 2020-07-24 PROCEDURE — 93225 XTRNL ECG REC<48 HRS REC: CPT

## 2020-07-24 PROCEDURE — 93226 XTRNL ECG REC<48 HR SCAN A/R: CPT

## 2020-07-27 PROCEDURE — 93227 XTRNL ECG REC<48 HR R&I: CPT | Performed by: INTERNAL MEDICINE

## 2020-07-27 NOTE — SOCIAL WORK
Phone mssg left for pts sig other Bushra at [phone]   Awaiting call back to discuss team update and inquire about supervision on dc Please notify patient of results:  The lung nodule she had before has resolved.  Chest findings are  otherwise stable.

## 2020-07-31 ENCOUNTER — HOSPITAL ENCOUNTER (INPATIENT)
Facility: HOSPITAL | Age: 85
LOS: 3 days | Discharge: NURSING, REHAB OR LONG-TERM CARE | DRG: 185 | End: 2020-08-04
Attending: EMERGENCY MEDICINE | Admitting: STUDENT IN AN ORGANIZED HEALTH CARE EDUCATION/TRAINING PROGRAM
Payer: MEDICARE

## 2020-07-31 ENCOUNTER — APPOINTMENT (EMERGENCY)
Dept: CT IMAGING | Facility: HOSPITAL | Age: 85
DRG: 185 | End: 2020-07-31
Payer: MEDICARE

## 2020-07-31 ENCOUNTER — APPOINTMENT (OUTPATIENT)
Dept: RADIOLOGY | Facility: HOSPITAL | Age: 85
DRG: 185 | End: 2020-07-31
Payer: MEDICARE

## 2020-07-31 ENCOUNTER — APPOINTMENT (EMERGENCY)
Dept: RADIOLOGY | Facility: HOSPITAL | Age: 85
DRG: 185 | End: 2020-07-31
Payer: MEDICARE

## 2020-07-31 DIAGNOSIS — M25.552 ACUTE HIP PAIN, LEFT: ICD-10-CM

## 2020-07-31 DIAGNOSIS — S22.32XA LEFT RIB FRACTURE: ICD-10-CM
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
## 2020-07-31 PROBLEM — S22.39XA RIB FRACTURE: Status: ACTIVE | Noted: 2020-07-31

## 2020-07-31 LAB
ALBUMIN SERPL BCP-MCNC: 3.4 G/DL (ref 3.5–5)
ALP SERPL-CCNC: 56 U/L (ref 46–116)
ALT SERPL W P-5'-P-CCNC: 18 U/L (ref 12–78)
ANION GAP SERPL CALCULATED.3IONS-SCNC: 6 MMOL/L (ref 4–13)
APTT PPP: 33 SECONDS (ref 23–37)
AST SERPL W P-5'-P-CCNC: 16 U/L (ref 5–45)
ATRIAL RATE: 45 BPM
BASOPHILS # BLD AUTO: 0.05 THOUSANDS/ΜL (ref 0–0.1)
BASOPHILS NFR BLD AUTO: 1 % (ref 0–1)
BILIRUB SERPL-MCNC: 0.5 MG/DL (ref 0.2–1)
BUN SERPL-MCNC: 37 MG/DL (ref 5–25)
CALCIUM SERPL-MCNC: 8.9 MG/DL (ref 8.3–10.1)
CHLORIDE SERPL-SCNC: 104 MMOL/L (ref 100–108)
CO2 SERPL-SCNC: 27 MMOL/L (ref 21–32)
CREAT SERPL-MCNC: 1.86 MG/DL (ref 0.6–1.3)
EOSINOPHIL # BLD AUTO: 0.24 THOUSAND/ΜL (ref 0–0.61)
EOSINOPHIL NFR BLD AUTO: 3 % (ref 0–6)
ERYTHROCYTE [DISTWIDTH] IN BLOOD BY AUTOMATED COUNT: 12.7 % (ref 11.6–15.1)
GFR SERPL CREATININE-BSD FRML MDRD: 33 ML/MIN/1.73SQ M
GLUCOSE SERPL-MCNC: 154 MG/DL (ref 65–140)
GLUCOSE SERPL-MCNC: 213 MG/DL (ref 65–140)
GLUCOSE SERPL-MCNC: 218 MG/DL (ref 65–140)
HCT VFR BLD AUTO: 37.1 % (ref 36.5–49.3)
HGB BLD-MCNC: 12.1 G/DL (ref 12–17)
IMM GRANULOCYTES # BLD AUTO: 0.04 THOUSAND/UL (ref 0–0.2)
IMM GRANULOCYTES NFR BLD AUTO: 1 % (ref 0–2)
INR PPP: 1.17 (ref 0.84–1.19)
LYMPHOCYTES # BLD AUTO: 1.22 THOUSANDS/ΜL (ref 0.6–4.47)
LYMPHOCYTES NFR BLD AUTO: 15 % (ref 14–44)
MCH RBC QN AUTO: 31.3 PG (ref 26.8–34.3)
MCHC RBC AUTO-ENTMCNC: 32.6 G/DL (ref 31.4–37.4)
MCV RBC AUTO: 96 FL (ref 82–98)
MONOCYTES # BLD AUTO: 0.49 THOUSAND/ΜL (ref 0.17–1.22)
MONOCYTES NFR BLD AUTO: 6 % (ref 4–12)
NEUTROPHILS # BLD AUTO: 6 THOUSANDS/ΜL (ref 1.85–7.62)
NEUTS SEG NFR BLD AUTO: 74 % (ref 43–75)
NRBC BLD AUTO-RTO: 0 /100 WBCS
P AXIS: 59 DEGREES
PLATELET # BLD AUTO: 169 THOUSANDS/UL (ref 149–390)
PMV BLD AUTO: 9.8 FL (ref 8.9–12.7)
POTASSIUM SERPL-SCNC: 4.7 MMOL/L (ref 3.5–5.3)
PR INTERVAL: 252 MS
PROT SERPL-MCNC: 7.3 G/DL (ref 6.4–8.2)
PROTHROMBIN TIME: 14.4 SECONDS (ref 11.6–14.5)
QRS AXIS: 73 DEGREES
QRSD INTERVAL: 150 MS
QT INTERVAL: 502 MS
QTC INTERVAL: 434 MS
RBC # BLD AUTO: 3.86 MILLION/UL (ref 3.88–5.62)
SODIUM SERPL-SCNC: 137 MMOL/L (ref 136–145)
T WAVE AXIS: 101 DEGREES
VENTRICULAR RATE: 45 BPM
WBC # BLD AUTO: 8.04 THOUSAND/UL (ref 4.31–10.16)

## 2020-07-31 PROCEDURE — 71046 X-RAY EXAM CHEST 2 VIEWS: CPT

## 2020-07-31 PROCEDURE — 80053 COMPREHEN METABOLIC PANEL: CPT | Performed by: INTERNAL MEDICINE

## 2020-07-31 PROCEDURE — 82948 REAGENT STRIP/BLOOD GLUCOSE: CPT

## 2020-07-31 PROCEDURE — 99220 PR INITIAL OBSERVATION CARE/DAY 70 MINUTES: CPT | Performed by: STUDENT IN AN ORGANIZED HEALTH CARE EDUCATION/TRAINING PROGRAM

## 2020-07-31 PROCEDURE — 85610 PROTHROMBIN TIME: CPT | Performed by: INTERNAL MEDICINE

## 2020-07-31 PROCEDURE — 74177 CT ABD & PELVIS W/CONTRAST: CPT

## 2020-07-31 PROCEDURE — 99285 EMERGENCY DEPT VISIT HI MDM: CPT

## 2020-07-31 PROCEDURE — 93010 ELECTROCARDIOGRAM REPORT: CPT | Performed by: INTERNAL MEDICINE

## 2020-07-31 PROCEDURE — 73502 X-RAY EXAM HIP UNI 2-3 VIEWS: CPT

## 2020-07-31 PROCEDURE — 70450 CT HEAD/BRAIN W/O DYE: CPT

## 2020-07-31 PROCEDURE — 72125 CT NECK SPINE W/O DYE: CPT

## 2020-07-31 PROCEDURE — 85025 COMPLETE CBC W/AUTO DIFF WBC: CPT | Performed by: INTERNAL MEDICINE

## 2020-07-31 PROCEDURE — 71260 CT THORAX DX C+: CPT

## 2020-07-31 PROCEDURE — 94760 N-INVAS EAR/PLS OXIMETRY 1: CPT

## 2020-07-31 PROCEDURE — 93005 ELECTROCARDIOGRAM TRACING: CPT

## 2020-07-31 PROCEDURE — 36415 COLL VENOUS BLD VENIPUNCTURE: CPT | Performed by: INTERNAL MEDICINE

## 2020-07-31 PROCEDURE — 96374 THER/PROPH/DIAG INJ IV PUSH: CPT

## 2020-07-31 PROCEDURE — 99284 EMERGENCY DEPT VISIT MOD MDM: CPT | Performed by: INTERNAL MEDICINE

## 2020-07-31 PROCEDURE — 85730 THROMBOPLASTIN TIME PARTIAL: CPT | Performed by: INTERNAL MEDICINE

## 2020-07-31 RX ORDER — DOCUSATE SODIUM 100 MG/1
100 CAPSULE, LIQUID FILLED ORAL 2 TIMES DAILY
Status: DISCONTINUED | OUTPATIENT
Start: 2020-07-31 | End: 2020-08-04 | Stop reason: HOSPADM

## 2020-07-31 RX ORDER — FENTANYL CITRATE 50 UG/ML
50 INJECTION, SOLUTION INTRAMUSCULAR; INTRAVENOUS ONCE
Status: COMPLETED | OUTPATIENT
Start: 2020-07-31 | End: 2020-07-31

## 2020-07-31 RX ORDER — OXYCODONE HYDROCHLORIDE 5 MG/1
2.5 TABLET ORAL EVERY 4 HOURS PRN
Status: DISCONTINUED | OUTPATIENT
Start: 2020-07-31 | End: 2020-07-31

## 2020-07-31 RX ORDER — OXYCODONE HYDROCHLORIDE 5 MG/1
7.5 TABLET ORAL EVERY 4 HOURS PRN
Status: DISCONTINUED | OUTPATIENT
Start: 2020-07-31 | End: 2020-08-04 | Stop reason: HOSPADM

## 2020-07-31 RX ORDER — OXYCODONE HYDROCHLORIDE 5 MG/1
5 TABLET ORAL EVERY 4 HOURS PRN
Status: DISCONTINUED | OUTPATIENT
Start: 2020-07-31 | End: 2020-07-31

## 2020-07-31 RX ORDER — LIDOCAINE 50 MG/G
1 PATCH TOPICAL DAILY
Status: DISCONTINUED | OUTPATIENT
Start: 2020-07-31 | End: 2020-08-04 | Stop reason: HOSPADM

## 2020-07-31 RX ORDER — CLOPIDOGREL BISULFATE 75 MG/1
75 TABLET ORAL DAILY
Status: DISCONTINUED | OUTPATIENT
Start: 2020-08-01 | End: 2020-08-04 | Stop reason: HOSPADM

## 2020-07-31 RX ORDER — ISOSORBIDE DINITRATE 10 MG/1
10 TABLET ORAL 2 TIMES DAILY
Status: DISCONTINUED | OUTPATIENT
Start: 2020-07-31 | End: 2020-08-04 | Stop reason: HOSPADM

## 2020-07-31 RX ORDER — ACETAMINOPHEN 325 MG/1
975 TABLET ORAL EVERY 8 HOURS SCHEDULED
Status: DISCONTINUED | OUTPATIENT
Start: 2020-07-31 | End: 2020-08-04 | Stop reason: HOSPADM

## 2020-07-31 RX ORDER — NITROGLYCERIN 0.4 MG/1
0.4 TABLET SUBLINGUAL
Status: DISCONTINUED | OUTPATIENT
Start: 2020-07-31 | End: 2020-08-04 | Stop reason: HOSPADM

## 2020-07-31 RX ORDER — MORPHINE SULFATE 4 MG/ML
4 INJECTION, SOLUTION INTRAMUSCULAR; INTRAVENOUS ONCE
Status: DISCONTINUED | OUTPATIENT
Start: 2020-07-31 | End: 2020-07-31

## 2020-07-31 RX ORDER — HYDRALAZINE HYDROCHLORIDE 20 MG/ML
5 INJECTION INTRAMUSCULAR; INTRAVENOUS EVERY 6 HOURS PRN
Status: DISCONTINUED | OUTPATIENT
Start: 2020-07-31 | End: 2020-08-04 | Stop reason: HOSPADM

## 2020-07-31 RX ORDER — GABAPENTIN 100 MG/1
100 CAPSULE ORAL 2 TIMES DAILY
Status: DISCONTINUED | OUTPATIENT
Start: 2020-07-31 | End: 2020-08-04 | Stop reason: HOSPADM

## 2020-07-31 RX ORDER — LISINOPRIL 20 MG/1
40 TABLET ORAL DAILY
Status: DISCONTINUED | OUTPATIENT
Start: 2020-08-01 | End: 2020-08-04 | Stop reason: HOSPADM

## 2020-07-31 RX ORDER — DOXAZOSIN MESYLATE 4 MG/1
8 TABLET ORAL DAILY
Status: DISCONTINUED | OUTPATIENT
Start: 2020-08-01 | End: 2020-08-04 | Stop reason: HOSPADM

## 2020-07-31 RX ORDER — OXYCODONE HYDROCHLORIDE 5 MG/1
5 TABLET ORAL EVERY 4 HOURS PRN
Status: DISCONTINUED | OUTPATIENT
Start: 2020-07-31 | End: 2020-08-04 | Stop reason: HOSPADM

## 2020-07-31 RX ORDER — AMLODIPINE BESYLATE 10 MG/1
10 TABLET ORAL DAILY
Status: DISCONTINUED | OUTPATIENT
Start: 2020-08-01 | End: 2020-08-04 | Stop reason: HOSPADM

## 2020-07-31 RX ADMIN — FENTANYL CITRATE 50 MCG: 50 INJECTION INTRAMUSCULAR; INTRAVENOUS at 10:23

## 2020-07-31 RX ADMIN — ACETAMINOPHEN 975 MG: 325 TABLET, FILM COATED ORAL at 21:49

## 2020-07-31 RX ADMIN — OXYCODONE HYDROCHLORIDE 5 MG: 5 TABLET ORAL at 16:16

## 2020-07-31 RX ADMIN — APIXABAN 2.5 MG: 2.5 TABLET, FILM COATED ORAL at 17:56

## 2020-07-31 RX ADMIN — IOHEXOL 100 ML: 350 INJECTION, SOLUTION INTRAVENOUS at 09:21

## 2020-07-31 RX ADMIN — ISOSORBIDE DINITRATE 10 MG: 10 TABLET ORAL at 17:56

## 2020-07-31 RX ADMIN — ACETAMINOPHEN 975 MG: 325 TABLET, FILM COATED ORAL at 15:19

## 2020-07-31 RX ADMIN — DOCUSATE SODIUM 100 MG: 100 CAPSULE, LIQUID FILLED ORAL at 17:57

## 2020-07-31 RX ADMIN — INSULIN LISPRO 2 UNITS: 100 INJECTION, SOLUTION INTRAVENOUS; SUBCUTANEOUS at 18:12

## 2020-07-31 RX ADMIN — INSULIN LISPRO 2 UNITS: 100 INJECTION, SOLUTION INTRAVENOUS; SUBCUTANEOUS at 21:50

## 2020-07-31 RX ADMIN — LIDOCAINE 1 PATCH: 50 PATCH CUTANEOUS at 15:18

## 2020-07-31 RX ADMIN — GABAPENTIN 100 MG: 100 CAPSULE ORAL at 17:56

## 2020-07-31 NOTE — SOCIAL WORK
Per chart review pt is status post fall  Pt has hx of afib, ckd, and dm  Pt awaiting PT/OT  Needs tbd pending assessment  CM Dept will follow pt through dc

## 2020-07-31 NOTE — ASSESSMENT & PLAN NOTE
Lab Results   Component Value Date    HGBA1C 7 2 (A) 01/28/2020       No results for input(s): POCGLU in the last 72 hours      Blood Sugar Average: Last 72 hrs:   carb controlled diet  Sliding scale  Fingersticks

## 2020-07-31 NOTE — ED NOTES
1  CC - fall from standing position while walking his dog  Pt c/o left rib pain, left hip pain  2  Admission related to injury? - yes    3  Orientation status - A&Ox4    4  Abnormal labs/abnormal focused assessment/vitals - left sided rib fractures (5th and 6th)    5  Medications/drips -     6  Last time narcotics given - 1023 Fentanyl given    7  IV lines/drains/etc - 18 R AC    8  Isolation status - standard    9  Skin - intact     10  Ambulation - unable to lift left leg off stretcher    11   ED nurse's name/# -       Lindsey Friedman RN  07/31/20 [id number]

## 2020-07-31 NOTE — PLAN OF CARE
Problem: Potential for Falls  Goal: Patient will remain free of falls  Description  INTERVENTIONS:  - Assess patient frequently for physical needs  -  Identify cognitive and physical deficits and behaviors that affect risk of falls    -  [place] fall precautions as indicated by assessment   - Educate patient/family on patient safety including physical limitations  - Instruct patient to call for assistance with activity based on assessment  - Modify environment to reduce risk of injury  - Consider OT/PT consult to assist with strengthening/mobility  Outcome: Progressing     Problem: PAIN - ADULT  Goal: Verbalizes/displays adequate comfort level or baseline comfort level  Description  Interventions:  - Encourage patient to monitor pain and request assistance  - Assess pain using appropriate pain scale  - Administer analgesics based on type and severity of pain and evaluate response  - Implement non-pharmacological measures as appropriate and evaluate response  - Consider cultural and social influences on pain and pain management  - Notify physician/advanced practitioner if interventions unsuccessful or patient reports new pain  Outcome: Progressing     Problem: INFECTION - ADULT  Goal: Absence or prevention of progression during hospitalization  Description  INTERVENTIONS:  - Assess and monitor for signs and symptoms of infection  - Monitor lab/diagnostic results  - Monitor all insertion sites, i e  indwelling lines, tubes, and drains  - Monitor endotracheal if appropriate and nasal secretions for changes in amount and color  - [place] appropriate cooling/warming therapies per order  - Administer medications as ordered  - Instruct and encourage patient and family to use good hand hygiene technique  - Identify and instruct in appropriate isolation precautions for identified infection/condition  Outcome: Progressing  Goal: Absence of fever/infection during neutropenic period  Description  INTERVENTIONS:  - Monitor WBC    Outcome: Progressing     Problem: SAFETY ADULT  Goal: Maintain or return to baseline ADL function  Description  INTERVENTIONS:  -  Assess patient's ability to carry out ADLs; assess patient's baseline for ADL function and identify physical deficits which impact ability to perform ADLs (bathing, care of mouth/teeth, toileting, grooming, dressing, etc )  - Assess/evaluate cause of self-care deficits   - Assess range of motion  - Assess patient's mobility; develop plan if impaired  - Assess patient's need for assistive devices and provide as appropriate  - Encourage maximum independence but intervene and supervise when necessary  - Involve family in performance of ADLs  - Assess for home care needs following discharge   - Consider OT consult to assist with ADL evaluation and planning for discharge  - Provide patient education as appropriate  Outcome: Progressing  Goal: Maintain or return mobility status to optimal level  Description  INTERVENTIONS:  - Assess patient's baseline mobility status (ambulation, transfers, stairs, etc )    - Identify cognitive and physical deficits and behaviors that affect mobility  - Identify mobility aids required to assist with transfers and/or ambulation (gait belt, sit-to-stand, lift, walker, cane, etc )  - [place] fall precautions as indicated by assessment  - Record patient progress and toleration of activity level on Mobility SBAR; progress patient to next Phase/Stage  - Instruct patient to call for assistance with activity based on assessment  - Consider rehabilitation consult to assist with strengthening/weightbearing, etc   Outcome: Progressing     Problem: DISCHARGE PLANNING  Goal: Discharge to home or other facility with appropriate resources  Description  INTERVENTIONS:  - Identify barriers to discharge w/patient and caregiver  - Arrange for needed discharge resources and transportation as appropriate  - Identify discharge learning needs (meds, wound care, etc )  - Arrange for interpretive services to assist at discharge as needed  - Refer to Case Management Department for coordinating discharge planning if the patient needs post-hospital services based on physician/advanced practitioner order or complex needs related to functional status, cognitive ability, or social support system  Outcome: Progressing     Problem: Knowledge Deficit  Goal: Patient/family/caregiver demonstrates understanding of disease process, treatment plan, medications, and discharge instructions  Description  Complete learning assessment and assess knowledge base    Interventions:  - Provide teaching at level of understanding  - Provide teaching via preferred learning methods  Outcome: Progressing

## 2020-07-31 NOTE — ED PROVIDER NOTES
Emergency Department Trauma Note  Ortiz Liao 82 y o  male MRN: [id number]  Unit/Bed#: ED 21/ED 21 Encounter: [id number]      Trauma Alert: Trauma Acuity: Trauma Evaluation  Model of Arrival: Mode of Arrival: BLS via Trauma Squad Name and Number: [id number]  Trauma Team: Current Providers  Attending Provider: Sudhir Hills MD  Resident: Thang Conn MD  Registered Nurse: Shital Woodward RN  ED Technician: Gale Herrera  Consultants: None       History of Present Illness     Chief Complaint:   Chief Complaint   Patient presents with   Benji Abt Fall     pt fell yesterday walking his dog , left sided rib and hip pain , no LOC and on thinners      HPI:  Ortiz Liao is a 82 y o  male with PMH of paroxysmal atrial fibrillation, currently on anticoagulants (apixaban and clopidogrel) presents with history of a fall yesterday evening around 5:30 pm  Reports that he was walking his dog, when she pulled him and he fell over onto his left side  Required help to rise from the ground after his fall  Since this incident, he reports chest pain in his left chest wall which is worsened with deep inspiration  He denies any shortness of breath  He is also complaining of pain in his left hip  He denies any radiation of the chest or hip pain, numbness or tingling in his leg  Mechanism:Details of Incident: fall walking the dog Injury Date: 07/30/20          History provided by:  Patient   used: No    Fall   Mechanism of injury: fall    Injury location:  Torso and leg  Torso injury location:  L chest and L breast  Leg injury location:  L hip  Incident location:  [place]  Time since incident:  17 hours  Arrived directly from scene: no    Fall:     Fall occurred:  Walking and from an animal    Entrapment: Required help to rise after the fall      Suspicion of alcohol use: no    Suspicion of drug use: no    Tetanus status:  Unknown  Prior to arrival data:     Bystander interventions: From friends to get up after the fall  Patient ambulatory at scene: no      Blood loss:  None    Responsiveness at scene:  Alert    Orientation at scene:  Person    Loss of consciousness: no      Amnesic to event: no      Airway interventions:  None    Breathing interventions:  None  Associated symptoms: abdominal pain (Diffuse tenderness in epigastric, LUQ) and chest pain (Left chest wall)    Associated symptoms: no difficulty breathing, no headaches, no loss of consciousness, no nausea and no neck pain      Review of Systems   Constitutional: Negative for chills and fever  HENT: Negative for facial swelling, nosebleeds and sinus pain  Eyes: Negative for pain and visual disturbance  Respiratory: Negative for cough, chest tightness and shortness of breath  Cardiovascular: Positive for chest pain (Left chest wall)  Negative for palpitations  Gastrointestinal: Positive for abdominal pain (Diffuse tenderness in epigastric, LUQ)  Negative for abdominal distention, constipation, diarrhea and nausea  Genitourinary: Negative for dysuria and flank pain  Musculoskeletal: Positive for gait problem ( not able to ambulate due to left hip pain)  Negative for neck pain  Neurological: Negative for dizziness, loss of consciousness, syncope and headaches  Psychiatric/Behavioral: Negative for agitation, behavioral problems and confusion         Historical Information     Immunizations:   Immunization History   Administered Date(s) Administered    INFLUENZA 03/04/2019, 12/18/2019    Influenza TIV (IM) 1937    Pneumococcal Conjugate 13-Valent 12/04/2018, 12/18/2019    Pneumococcal Polysaccharide PPV23 1937, 1937    Tdap 1937    Zoster 1937    influenza, trivalent, adjuvanted 12/18/2019       Past Medical History:   Diagnosis Date    Acute ST elevation [facility])     last assessed: 04/15/2016    Aortic valve disorder     Benign prostatic hyperplasia with lower urinary tract symptoms     CAD (coronary artery disease)     Chronic kidney disease     DM2 (diabetes mellitus, type 2) (HCC)     History of colonoscopy     resolved: 05/08/2012    History of transfusion     History of transient cerebral ischemia     HLD (hyperlipidemia)     HTN (hypertension)     Neuralgia        Family History   Problem Relation Age of Onset    Emphysema Father     Emphysema Sister      Past Surgical History:   Procedure Laterality Date    CARDIAC CATHETERIZATION      Outcome: successful; last assessed: 02/03/2015    CARDIAC CATHETERIZATION  11/26/2019    CORONARY ANGIOPLASTY WITH STENT PLACEMENT  2008    stent to LAD     HAND SURGERY      thumb    HIP HARDWARE REMOVAL      TOTAL HIP ARTHROPLASTY Right     TOTAL HIP ARTHROPLASTY Bilateral      Social History     Tobacco Use    Smoking status: Never Smoker    Smokeless tobacco: Never Used   Substance Use Topics    Alcohol use: Never     Frequency: Never     Binge frequency: Never    Drug use: No     E-Cigarette/Vaping    E-Cigarette Use Never User      E-Cigarette/Vaping Substances    Nicotine No     THC No     CBD No     Flavoring No     Other No     Unknown No        Family History: non-contributory    Meds/Allergies   Prior to Admission Medications   Prescriptions Last Dose Informant Patient Reported? Taking?    Blood Glucose Monitoring Suppl (ONE TOUCH ULTRA MINI) w/Device KIT  Child Yes No   Sig: by Does not apply route   amLODIPine (NORVASC) 10 mg tablet  Child No No   Sig: TAKE 1 TABLET BY MOUTH  DAILY   apixaban (ELIQUIS) 2 5 mg   No No   Sig: Take 1 tablet (2 5 mg total) by mouth 2 (two) times a day   cholecalciferol (VITAMIN D3) 1,000 units tablet  Child Yes No   Sig: Take 1,000 Units by mouth daily   clopidogrel (PLAVIX) 75 mg tablet  Child No No   Sig: TAKE 1 TABLET BY MOUTH  DAILY   doxazosin (CARDURA) 8 MG tablet  Child No No   Sig: TAKE 1 TABLET BY MOUTH  DAILY   gabapentin (NEURONTIN) 100 mg capsule   No No   Sig: Take 1 capsule (100 mg total) by mouth 2 (two) times a day   glucose blood (ONE TOUCH ULTRA TEST) test strip  Child No No   Sig: Test once daily   isosorbide dinitrate (ISORDIL) 10 mg tablet   No No   Sig: Take 1 tablet (10 mg total) by mouth 2 (two) times a day   lisinopril (ZESTRIL) 40 mg tablet  Child No No   Sig: Take 1 tablet (40 mg total) by mouth daily   metoprolol tartrate (LOPRESSOR) 25 mg tablet   Yes No   Sig: Take 25 mg by mouth every 12 (twelve) hours   nitroglycerin (NITROSTAT) 0 4 mg SL tablet  Child No No   Sig: Place 1 tablet (0 4 mg total) under the tongue every 5 (five) minutes as needed for chest pain   omega-3-acid ethyl esters (LOVAZA) 1 g capsule  Child Yes No   Sig: Take 1,200 mg by mouth daily   pyridoxine (RA VITAMIN B-6) 100 MG tablet  Child Yes No   Sig: Take 1 tablet by mouth daily   vitamin B-12 (CYANOCOBALAMIN) 100 MCG tablet  Child Yes No   Sig: Take 1,000 mcg by mouth daily      Facility-Administered Medications: None       Allergies   Allergen Reactions    Celecoxib     Hydromorphone     Pravastatin Hives    Statins        PHYSICAL EXAM    PE limited by:  Pain    Objective   Vitals:   First set: Temperature: 98 7 °F (37 1 °C) (07/31/20 0840)  Pulse: (!) 47 (07/31/20 0840)  Respirations: 18 (07/31/20 0840)  Blood Pressure: 162/72 (07/31/20 0840)  SpO2: 93 % (07/31/20 0840)    Primary Survey:   (A) Airway: intact  (B) Breathing:  Stable, clear lung  (C) Circulation: Pulses:   Peripheral pulses present  (D) Disabliity:  GCS Total:  15  (E) Expose:  Completed    Secondary Survey: (Click on Physical Exam tab above)  Physical Exam   Constitutional: He is oriented to person, place, and time  He appears well-developed and well-nourished  HENT:   Head: Normocephalic and atraumatic  Eyes: EOM are normal  No scleral icterus  Neck: Normal range of motion  Cardiovascular: Normal rate, regular rhythm and normal heart sounds     Pulmonary/Chest: Effort normal and breath sounds normal  No respiratory distress  Pain in chest wall with deep inhalation   Abdominal: Soft  He exhibits no mass  There is tenderness (Tender in epigastric, LUQ)  There is no rebound and no guarding  Musculoskeletal:   Unable to ambulate due to left hip pain  No visible injuries over left hip  Patient is not willing to move left hip due to pain  He is able to flex the knee and recall his toes    No visible injuries on chest wall  Tender to palpation left side  Pain worsens with deep inspiration   Neurological: He is alert and oriented to person, place, and time  Skin: Skin is warm  Psychiatric: He has a normal mood and affect   His behavior is normal        Invasive Devices     Peripheral Intravenous Line            Peripheral IV 07/31/20 Right Antecubital less than 1 day                Lab Results:   Results Reviewed     Procedure Component Value Units Date/Time    Comprehensive metabolic panel [[id number]]  (Abnormal) Collected:  07/31/20 0907    Lab Status:  Final result Specimen:  Blood from Arm, Right Updated:  07/31/20 0933     Sodium 137 mmol/L      Potassium 4 7 mmol/L      Chloride 104 mmol/L      CO2 27 mmol/L      ANION GAP 6 mmol/L      BUN 37 mg/dL      Creatinine 1 86 mg/dL      Glucose 154 mg/dL      Calcium 8 9 mg/dL      AST 16 U/L      ALT 18 U/L      Alkaline Phosphatase 56 U/L      Total Protein 7 3 g/dL      Albumin 3 4 g/dL      Total Bilirubin 0 50 mg/dL      eGFR 33 ml/min/1 73sq m     Narrative:       Jose guidelines for Chronic Kidney Disease (CKD):     Stage 1 with normal or high GFR (GFR > 90 mL/min/1 73 square meters)    Stage 2 Mild CKD (GFR = 60-89 mL/min/1 73 square meters)    Stage 3A Moderate CKD (GFR = 45-59 mL/min/1 73 square meters)    Stage 3B Moderate CKD (GFR = 30-44 mL/min/1 73 square meters)    Stage 4 Severe CKD (GFR = 15-29 mL/min/1 73 square meters)    Stage 5 End Stage CKD (GFR <15 mL/min/1 73 square meters)  Note: GFR calculation is accurate only with a steady state creatinine    APTT [[id number]]  (Normal) Collected:  07/31/20 0907    Lab Status:  Final result Specimen:  Blood from Arm, Right Updated:  07/31/20 0923     PTT 33 seconds     Protime-INR [[id number]]  (Normal) Collected:  07/31/20 0907    Lab Status:  Final result Specimen:  Blood from Arm, Right Updated:  07/31/20 0923     Protime 14 4 seconds      INR 1 17    CBC and differential [[id number]]  (Abnormal) Collected:  07/31/20 0907    Lab Status:  Final result Specimen:  Blood from Arm, Right Updated:  07/31/20 0912     WBC 8 04 Thousand/uL      RBC 3 86 Million/uL      Hemoglobin 12 1 g/dL      Hematocrit 37 1 %      MCV 96 fL      MCH 31 3 pg      MCHC 32 6 g/dL      RDW 12 7 %      MPV 9 8 fL      Platelets 028 Thousands/uL      nRBC 0 /100 WBCs      Neutrophils Relative 74 %      Immat GRANS % 1 %      Lymphocytes Relative 15 %      Monocytes Relative 6 %      Eosinophils Relative 3 %      Basophils Relative 1 %      Neutrophils Absolute 6 00 Thousands/µL      Immature Grans Absolute 0 04 Thousand/uL      Lymphocytes Absolute 1 22 Thousands/µL      Monocytes Absolute 0 49 Thousand/µL      Eosinophils Absolute 0 24 Thousand/µL      Basophils Absolute 0 05 Thousands/µL                  Imaging Studies:   Direct to CT: Yes  XR hip/pelv 2-3 vws left if performed   Final Result by Nelson Drew MD (07/31 1033)      Status post bilateral hip arthroplasty without evidence for periprosthetic fracture  Workstation performed: [id number]         CT spine cervical without contrast   Final Result by Sarita Houser DO (07/31 1412)      No cervical spine fracture or traumatic malalignment  Moderately advanced multilevel cervical spondylosis  Workstation performed: [id number]         CT recon only thoracolumbar   Final Result by Allan Sosa MD (07/31 1032)      No fracture or traumatic subluxation      Moderately severe lumbar and milder thoracic degenerative spondylosis is unchanged      Workstation performed: [id number]         CT chest abdomen pelvis w contrast   Final Result by Bernarda Caicedo DO (07/31 1020)      1  Acute, minimally displaced fractures of the left anterior 5th and 6th ribs  No pneumothorax, pleural effusion or pulmonary contusion  2  No acute solid organ intra-abdominal injury within limitations  3  Limited evaluation of the pelvis due to beam hardening artifact from total hip replacements  4  Urinary bladder wall thickening and suspected prostatomegaly, evaluation limited  Consider element of chronic bladder obstruction  5  Aneurysmal dilatation ascending 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  Dilated main pulmonary artery suggesting pulmonary artery hypertension  6  Colonic diverticulosis  Workstation performed: [id number]         CT head wo contrast   Final Result by Bernarda Caicedo DO (07/31 6067)      No acute intracranial abnormality  Workstation performed: [id number]               Procedures  ECG 12 Lead Documentation Only  Date/Time: 7/31/2020 9:48 AM  Performed by: Aissatou Corbin MD  Authorized by: Aissatou Corbin MD     ECG reviewed by me, the ED Provider: yes    Patient location:  ED  Previous ECG:     Previous ECG:  Compared to current    Similarity:  Changes noted (Premature atrial complexes are now present)  Interpretation:     Interpretation: abnormal    Rate:     ECG rate:  Forty-five    ECG rate assessment: bradycardic      ECG rate assessment comment:  Appears to be  Baseline  Rhythm:     Rhythm: sinus bradycardia and A-V block      Rhythm comment:  Right bundle-branch block, present on old EKG as well  Ectopy:     Ectopy: PAC    QRS:     QRS intervals: Wide             ED Course           MDM  Number of Diagnoses or Management Options  Diagnosis management comments:   1  Rib fracture  2  R/o Dislocation left hip  3  R/o Fracture left hip  4  Left hip pain  5   Chest pain Disposition  [facility] Transfer: No  Final diagnoses:   None     ED Disposition     None      Follow-up Information    None       Patient's Medications   Discharge Prescriptions    No medications on file     No discharge procedures on file      PDMP Review     None          ED Provider  Electronically Signed by         Alondra Sidhu MD  07/31/20 2190

## 2020-07-31 NOTE — ASSESSMENT & PLAN NOTE
Chest CT shows minimally displaced 5th and 6th rib fracture  Rib fracture protocol in place  Incentive spirometry  Pain control

## 2020-07-31 NOTE — ED ATTENDING ATTESTATION
7/31/2020  I, Noni Garcia MD, saw and evaluated the patient  I have discussed the patient with the resident/non-physician practitioner and agree with the resident's/non-physician practitioner's findings, Plan of Care, and MDM as documented in the resident's/non-physician practitioner's note, except where noted  All available labs and Radiology studies were reviewed  I was present for key portions of any procedure(s) performed by the resident/non-physician practitioner and I was immediately available to provide assistance  At this point I agree with the current assessment done in the Emergency Department  I have conducted an independent evaluation of this patient a history and physical is as follows:    ED Course     81 yo M presents after fall yesterday with left hip and chest wall pain  Exam: L rib TTP, L hip pain with active and passive ROM     CT shows two rib fractures, hip xray no fractures  Patient states unable to ambulate   Will admit for pain control and PT/OT    Critical Care Time  Procedures

## 2020-07-31 NOTE — ASSESSMENT & PLAN NOTE
Patient was walking his dog yesterday and tripped over the dog and had a fall resulting in hip pain and side pain  Patient was found to have 5th and 6th rib fractures  Hip x-ray shows replacement with no fracture  CT spine unremarkable  CT of head negative  Pain control  Incentive spirometry  Fall precautions  PT/OT

## 2020-07-31 NOTE — RESPIRATORY THERAPY NOTE
RT Protocol Note  Harrison Daft 82 y o  male MRN: [id number]  Unit/Bed#: -01 Encounter: [id number]    Assessment    Principal Problem:    Fall  Active Problems:    DM2 (diabetes mellitus, type 2) ([place] [address] )    Hypertensive kidney disease with stage 3 chronic kidney disease ([place] [address] )    Rib fracture      Home Pulmonary Medications:  none       Past Medical History:   Diagnosis Date    Acute ST elevation myocardial infarction [facility])     last assessed: 04/15/2016    Aortic valve disorder     Benign prostatic hyperplasia with lower urinary tract symptoms     CAD (coronary artery disease)     Chronic kidney disease     DM2 (diabetes mellitus, type 2) ([place] [address] )     History of colonoscopy     resolved: 05/08/2012    History of transfusion     History of transient cerebral ischemia     HLD (hyperlipidemia)     HTN (hypertension)     Neuralgia      Social History     Socioeconomic History    Marital status:       Spouse name: None    Number of children: None    Years of education: None    Highest education level: None   Occupational History    None   Social Needs    Financial resource strain: None    Food insecurity:     Worry: None     Inability: None    Transportation needs:     Medical: None     Non-medical: None   Tobacco Use    Smoking status: Never Smoker    Smokeless tobacco: Never Used   Substance and Sexual Activity    Alcohol use: Never     Frequency: Never     Binge frequency: Never    Drug use: No    Sexual activity: Not Currently     Partners: Female     Birth control/protection: None   Lifestyle    Physical activity:     Days per week: 0 days     Minutes per session: 0 min    Stress: Not at all   Relationships    Social connections:     Talks on phone: None     Gets together: None     Attends [employer] service: None     Active member of club or organization: None     Attends meetings of clubs or organizations: None     Relationship status: None    Intimate partner violence:     Fear of current or ex partner: None     Emotionally abused: None     Physically abused: None     Forced sexual activity: None   Other Topics Concern    None   Social History Narrative    Active advance directive (as per Allscripts)     No advance directive on file (as per [employer])        Subjective         Objective    Physical Exam:   Assessment Type: Assess only  General Appearance: Alert, Awake  Respiratory Pattern: Normal  Chest Assessment: Chest expansion symmetrical  Bilateral Breath Sounds: Clear    Vitals:  Blood pressure 167/65, pulse (!) 53, temperature 98 5 °F (36 9 °C), resp  rate 12, weight 102 kg (224 lb 13 9 oz), SpO2 96 %  Imaging and other studies: I have personally reviewed pertinent reports  Plan       Airway Clearance Plan: Incentive Spirometer     Patient admitted for fall  No previous respiratory history  IS goal is 720ml  Patient achieves 1500ml  Breath sounds are clear  Patient encouraged to use IS once an hour  No further modalities needed at this time

## 2020-07-31 NOTE — H&P
H&P- Prisctrish Givens 1935, 82 y o  male MRN: [id number]    Unit/Bed#: ED 21 Encounter: [id number]    Primary Care Provider: Paulo Pedersen DO   Date and time admitted to hospital: 7/31/2020  8:36 AM    * Fall  Assessment & Plan  Patient was walking his dog yesterday and tripped over the dog and had a fall resulting in hip pain and side pain  Patient was found to have 5th and 6th rib fractures  Hip x-ray shows replacement with no fracture  CT spine unremarkable  CT of head negative  Pain control  Incentive spirometry  Fall precautions  PT/OT    Rib fracture  Assessment & Plan  Chest CT shows minimally displaced 5th and 6th rib fracture  Rib fracture protocol in place  Incentive spirometry  Pain control    Hypertensive kidney disease with stage 3 chronic kidney disease ([facility] )  Assessment & Plan  Patient's creatinine is within baseline  Continue home medications  Avoid nephrotoxins    DM2 (diabetes mellitus, type 2) ([facility] )  Assessment & Plan  Lab Results   Component Value Date    HGBA1C 7 2 (A) 01/28/2020       No results for input(s): POCGLU in the last 72 hours  Blood Sugar Average: Last 72 hrs:   carb controlled diet  Sliding scale  Fingersticks         VTE Prophylaxis: Apixaban (Eliquis)  / sequential compression device   Code Status:  Full code  POLST: POLST form is not discussed and not completed at this time  Discussion with family:  Not available at this time    Anticipated Length of Stay:  Patient will be admitted on an Observation basis with an anticipated length of stay of  < 2 midnights  Justification for Hospital Stay:  Pain control, monitoring of respiratory status, PT evaluation, monitoring of bleeding    Total Time for Visit, including Counseling / Coordination of Care: 1 hour  Greater than 50% of this total time spent on direct patient counseling and coordination of care      Chief Complaint:      Chief Complaint   Patient presents with    Fall     pt fell yesterday walking his dog , left sided rib and hip pain , no LOC and on thinners          History of Present Illness:    Demetrio Bojorquez is a 82 y o  male who presents presents with history of a fall yesterday evening around 5:30 pm  Reports that he was walking his dog, when she pulled him and he fell over onto his left side  Required help to rise from the ground after his fall  Since this incident, he reports chest pain in his left chest wall which is worsened with deep inspiration  He denies any shortness of breath  He is also complaining of pain in his left hip  He denies any radiation of the chest or hip pain, numbness or tingling in his leg  Review of Systems:    Review of Systems   Constitutional: Positive for activity change  Eyes: Negative  Respiratory: Positive for shortness of breath  Cardiovascular: Positive for chest pain  Gastrointestinal: Negative  Endocrine: Negative  Genitourinary: Negative  Musculoskeletal: Positive for gait problem and myalgias  Hematological: Negative  Psychiatric/Behavioral: Negative  All other systems reviewed and are negative        Past Medical and Surgical History:     Past Medical History:   Diagnosis Date    Acute ST elevation myocardial infarction [facility])     last assessed: 04/15/2016    Aortic valve disorder     Benign prostatic hyperplasia with lower urinary tract symptoms     CAD (coronary artery disease)     Chronic kidney disease     DM2 (diabetes mellitus, type 2) ([facility] )     History of colonoscopy     resolved: 05/08/2012    History of transfusion     History of transient cerebral ischemia     HLD (hyperlipidemia)     HTN (hypertension)     Neuralgia        Past Surgical History:   Procedure Laterality Date    CARDIAC CATHETERIZATION      Outcome: successful; last assessed: 02/03/2015    CARDIAC CATHETERIZATION  11/26/2019    CORONARY ANGIOPLASTY WITH STENT PLACEMENT  2008    stent to LAD     HAND SURGERY      thumb    HIP HARDWARE REMOVAL      TOTAL HIP ARTHROPLASTY Right     TOTAL HIP ARTHROPLASTY Bilateral        Meds/Allergies:    Prior to Admission medications    Medication Sig Start Date End Date Taking?  Authorizing Provider   amLODIPine (NORVASC) 10 mg tablet TAKE 1 TABLET BY MOUTH  DAILY 3/29/20   Jose Manuel Persaud MD   apixaban [place]) 2 5 mg Take 1 tablet (2 5 mg total) by mouth 2 (two) times a day 7/22/20   Rhys Trivedi PA-C   Blood Glucose Monitoring Suppl (ONE TOUCH ULTRA MINI) w/Device KIT by Does not apply route    Historical Provider, MD   cholecalciferol (VITAMIN D3) 1,000 units tablet Take 1,000 Units by mouth daily    Historical Provider, MD   clopidogrel (PLAVIX) 75 mg tablet TAKE 1 TABLET BY MOUTH  DAILY 6/1/20   Enliken, DO   doxazosin (CARDURA) 8 MG tablet TAKE 1 TABLET BY MOUTH  DAILY 6/1/20   Anabel Chung MD   gabapentin (NEURONTIN) 100 mg capsule Take 1 capsule (100 mg total) by mouth 2 (two) times a day 7/23/20 8/22/20  Enliken, DO   glucose blood (ONE TOUCH ULTRA TEST) test strip Test once daily 6/12/20   Enliken, DO   isosorbide dinitrate (ISORDIL) 10 mg tablet Take 1 tablet (10 mg total) by mouth 2 (two) times a day 7/23/20 8/22/20  Enliken, DO   lisinopril (ZESTRIL) 40 mg tablet Take 1 tablet (40 mg total) by mouth daily 10/25/19   Enliken, DO   metoprolol tartrate (LOPRESSOR) 25 mg tablet Take 25 mg by mouth every 12 (twelve) hours    Historical Provider, MD   nitroglycerin (NITROSTAT) 0 4 mg SL tablet Place 1 tablet (0 4 mg total) under the tongue every 5 (five) minutes as needed for chest pain 4/24/20   Chino Garcia MD   zqkju-[id number] ethyl esters (LOVAZA) 1 g capsule Take 1,200 mg by mouth daily    Historical Provider, MD   pyridoxine (RA VITAMIN B-6) 100 MG tablet Take 1 tablet by mouth daily 1/8/18   Historical Provider, MD   vitamin B-12 (CYANOCOBALAMIN) 100 MCG tablet Take 1,000 mcg by mouth daily    Historical Provider, MD     I have reviewed home medications using allscripts  Allergies: Allergies   Allergen Reactions    Celecoxib     Hydromorphone     Pravastatin Hives    Statins        Social History:     Marital Status:    Occupation:  Retired  Patient Pre-hospital Living Situation:  Private residence  Patient Pre-hospital Level of Mobility:  Independent  Patient Pre-hospital Diet Restrictions:  Carb controlled  Substance Use History:   Social History     Substance and Sexual Activity   Alcohol Use Never    Frequency: Never    Binge frequency: Never     Social History     Tobacco Use   Smoking Status Never Smoker   Smokeless Tobacco Never Used     Social History     Substance and Sexual Activity   Drug Use No       Family History:    Family History   Problem Relation Age of Onset    Emphysema Father     Emphysema Sister        Physical Exam:     Vitals:   Blood Pressure: (!) 188/80 (07/31/20 1330)  Pulse: (!) 49 (07/31/20 1330)  Temperature: 98 7 °F (37 1 °C) (07/31/20 0840)  Temp Source: Oral (07/31/20 0840)  Respirations: 15 (07/31/20 1330)  Weight - Scale: 102 kg (224 lb 13 9 oz) (07/31/20 0840)  SpO2: 96 % (07/31/20 1330)    Physical Exam   Constitutional: He appears well-developed and well-nourished  HENT:   Head: Normocephalic  Eyes: Pupils are equal, round, and reactive to light  Neck: Normal range of motion  Cardiovascular: Normal rate  Pulmonary/Chest: Effort normal  He exhibits tenderness  Abdominal: Soft  Bowel sounds are normal    Musculoskeletal: Normal range of motion  He exhibits tenderness  Neurological: He is alert  Skin: Skin is warm  Ecchymosis noted  Psychiatric: He has a normal mood and affect  His behavior is normal    Nursing note and vitals reviewed  Additional Data:     Lab Results: I have personally reviewed pertinent reports        Results from last 7 days   Lab Units 07/31/20  0907   WBC Thousand/uL 8 04   HEMOGLOBIN g/dL 12 1   HEMATOCRIT % 37 1   PLATELETS Thousands/uL 169   NEUTROS PCT % 74   LYMPHS PCT % 15   MONOS PCT % 6   EOS PCT % 3     Results from last 7 days   Lab Units 07/31/20  0907   SODIUM mmol/L 137   POTASSIUM mmol/L 4 7   CHLORIDE mmol/L 104   CO2 mmol/L 27   BUN mg/dL 37*   CREATININE mg/dL 1 86*   ANION GAP mmol/L 6   CALCIUM mg/dL 8 9   ALBUMIN g/dL 3 4*   TOTAL BILIRUBIN mg/dL 0 50   ALK PHOS U/L 56   ALT U/L 18   AST U/L 16   GLUCOSE RANDOM mg/dL 154*     Results from last 7 days   Lab Units 07/31/20  0907   INR  1 17                   Imaging: I have personally reviewed pertinent reports  XR hip/pelv 2-3 vws left if performed   Final Result by Otoniel Lockhart MD (07/31 1033)      Status post bilateral hip arthroplasty without evidence for periprosthetic fracture  Workstation performed: [id number]         CT spine cervical without contrast   Final Result by Julissa Rodriguez DO (07/31 9122)      No cervical spine fracture or traumatic malalignment  Moderately advanced multilevel cervical spondylosis  Workstation performed: [id number]         CT recon only thoracolumbar   Final Result by Matt Stock MD (07/31 1032)      No fracture or traumatic subluxation  Moderately severe lumbar and milder thoracic degenerative spondylosis is unchanged      Workstation performed: [id number]         CT chest abdomen pelvis w contrast   Final Result by Julissa Rodriguez DO (07/31 1020)      1  Acute, minimally displaced fractures of the left anterior 5th and 6th ribs  No pneumothorax, pleural effusion or pulmonary contusion  2  No acute solid organ intra-abdominal injury within limitations  3  Limited evaluation of the pelvis due to beam hardening artifact from total hip replacements  4  Urinary bladder wall thickening and suspected prostatomegaly, evaluation limited  Consider element of chronic bladder obstruction  5  Aneurysmal dilatation ascending 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  Dilated main pulmonary artery suggesting pulmonary artery hypertension        6  Colonic diverticulosis  Workstation performed: [id number]         CT head wo contrast   Final Result by Marco Harris DO (07/31 0321)      No acute intracranial abnormality  Workstation performed: [id number]         XR chest pa & lateral (24 hours after admission)    (Results Pending)       EKG, Pathology, and Other Studies Reviewed on Admission:   · EKG:  Not available    Allscripts / Epic Records Reviewed: Yes     ** Please Note: This note has been constructed using a voice recognition system   **

## 2020-08-01 LAB
ANION GAP SERPL CALCULATED.3IONS-SCNC: 8 MMOL/L (ref 4–13)
BASOPHILS # BLD AUTO: 0.04 THOUSANDS/ΜL (ref 0–0.1)
BASOPHILS NFR BLD AUTO: 1 % (ref 0–1)
BUN SERPL-MCNC: 37 MG/DL (ref 5–25)
CALCIUM SERPL-MCNC: 8.9 MG/DL (ref 8.3–10.1)
CHLORIDE SERPL-SCNC: 104 MMOL/L (ref 100–108)
CO2 SERPL-SCNC: 24 MMOL/L (ref 21–32)
CREAT SERPL-MCNC: 2.09 MG/DL (ref 0.6–1.3)
EOSINOPHIL # BLD AUTO: 0.34 THOUSAND/ΜL (ref 0–0.61)
EOSINOPHIL NFR BLD AUTO: 5 % (ref 0–6)
ERYTHROCYTE [DISTWIDTH] IN BLOOD BY AUTOMATED COUNT: 12.8 % (ref 11.6–15.1)
GFR SERPL CREATININE-BSD FRML MDRD: 29 ML/MIN/1.73SQ M
GLUCOSE P FAST SERPL-MCNC: 134 MG/DL (ref 65–99)
GLUCOSE SERPL-MCNC: 134 MG/DL (ref 65–140)
GLUCOSE SERPL-MCNC: 145 MG/DL (ref 65–140)
GLUCOSE SERPL-MCNC: 154 MG/DL (ref 65–140)
GLUCOSE SERPL-MCNC: 183 MG/DL (ref 65–140)
GLUCOSE SERPL-MCNC: 227 MG/DL (ref 65–140)
HCT VFR BLD AUTO: 39.3 % (ref 36.5–49.3)
HGB BLD-MCNC: 12.8 G/DL (ref 12–17)
IMM GRANULOCYTES # BLD AUTO: 0.02 THOUSAND/UL (ref 0–0.2)
IMM GRANULOCYTES NFR BLD AUTO: 0 % (ref 0–2)
LYMPHOCYTES # BLD AUTO: 2.05 THOUSANDS/ΜL (ref 0.6–4.47)
LYMPHOCYTES NFR BLD AUTO: 32 % (ref 14–44)
MCH RBC QN AUTO: 31.3 PG (ref 26.8–34.3)
MCHC RBC AUTO-ENTMCNC: 32.6 G/DL (ref 31.4–37.4)
MCV RBC AUTO: 96 FL (ref 82–98)
MONOCYTES # BLD AUTO: 0.43 THOUSAND/ΜL (ref 0.17–1.22)
MONOCYTES NFR BLD AUTO: 7 % (ref 4–12)
NEUTROPHILS # BLD AUTO: 3.49 THOUSANDS/ΜL (ref 1.85–7.62)
NEUTS SEG NFR BLD AUTO: 55 % (ref 43–75)
NRBC BLD AUTO-RTO: 0 /100 WBCS
PLATELET # BLD AUTO: 163 THOUSANDS/UL (ref 149–390)
PMV BLD AUTO: 9.7 FL (ref 8.9–12.7)
POTASSIUM SERPL-SCNC: 4.6 MMOL/L (ref 3.5–5.3)
RBC # BLD AUTO: 4.09 MILLION/UL (ref 3.88–5.62)
SODIUM SERPL-SCNC: 136 MMOL/L (ref 136–145)
WBC # BLD AUTO: 6.37 THOUSAND/UL (ref 4.31–10.16)

## 2020-08-01 PROCEDURE — 82948 REAGENT STRIP/BLOOD GLUCOSE: CPT

## 2020-08-01 PROCEDURE — 97163 PT EVAL HIGH COMPLEX 45 MIN: CPT

## 2020-08-01 PROCEDURE — 80048 BASIC METABOLIC PNL TOTAL CA: CPT | Performed by: NURSE PRACTITIONER

## 2020-08-01 PROCEDURE — 85025 COMPLETE CBC W/AUTO DIFF WBC: CPT | Performed by: NURSE PRACTITIONER

## 2020-08-01 PROCEDURE — 99232 SBSQ HOSP IP/OBS MODERATE 35: CPT | Performed by: NURSE PRACTITIONER

## 2020-08-01 RX ORDER — SENNOSIDES 8.6 MG
2 TABLET ORAL
Status: DISCONTINUED | OUTPATIENT
Start: 2020-08-01 | End: 2020-08-04 | Stop reason: HOSPADM

## 2020-08-01 RX ADMIN — LIDOCAINE 1 PATCH: 50 PATCH CUTANEOUS at 09:20

## 2020-08-01 RX ADMIN — DOXAZOSIN 8 MG: 4 TABLET ORAL at 09:19

## 2020-08-01 RX ADMIN — ISOSORBIDE DINITRATE 10 MG: 10 TABLET ORAL at 09:19

## 2020-08-01 RX ADMIN — ACETAMINOPHEN 975 MG: 325 TABLET, FILM COATED ORAL at 13:29

## 2020-08-01 RX ADMIN — DOCUSATE SODIUM 100 MG: 100 CAPSULE, LIQUID FILLED ORAL at 17:57

## 2020-08-01 RX ADMIN — ACETAMINOPHEN 975 MG: 325 TABLET, FILM COATED ORAL at 21:20

## 2020-08-01 RX ADMIN — GABAPENTIN 100 MG: 100 CAPSULE ORAL at 09:19

## 2020-08-01 RX ADMIN — INSULIN LISPRO 1 UNITS: 100 INJECTION, SOLUTION INTRAVENOUS; SUBCUTANEOUS at 16:58

## 2020-08-01 RX ADMIN — AMLODIPINE BESYLATE 10 MG: 10 TABLET ORAL at 09:19

## 2020-08-01 RX ADMIN — ISOSORBIDE DINITRATE 10 MG: 10 TABLET ORAL at 17:57

## 2020-08-01 RX ADMIN — HYDRALAZINE HYDROCHLORIDE 5 MG: 20 INJECTION INTRAMUSCULAR; INTRAVENOUS at 23:45

## 2020-08-01 RX ADMIN — CLOPIDOGREL BISULFATE 75 MG: 75 TABLET ORAL at 09:19

## 2020-08-01 RX ADMIN — APIXABAN 2.5 MG: 2.5 TABLET, FILM COATED ORAL at 09:19

## 2020-08-01 RX ADMIN — LISINOPRIL 40 MG: 20 TABLET ORAL at 09:19

## 2020-08-01 RX ADMIN — GABAPENTIN 100 MG: 100 CAPSULE ORAL at 17:57

## 2020-08-01 RX ADMIN — APIXABAN 2.5 MG: 2.5 TABLET, FILM COATED ORAL at 17:57

## 2020-08-01 RX ADMIN — ACETAMINOPHEN 975 MG: 325 TABLET, FILM COATED ORAL at 05:40

## 2020-08-01 RX ADMIN — INSULIN LISPRO 1 UNITS: 100 INJECTION, SOLUTION INTRAVENOUS; SUBCUTANEOUS at 21:20

## 2020-08-01 RX ADMIN — OXYCODONE HYDROCHLORIDE 7.5 MG: 5 TABLET ORAL at 10:18

## 2020-08-01 RX ADMIN — INSULIN LISPRO 2 UNITS: 100 INJECTION, SOLUTION INTRAVENOUS; SUBCUTANEOUS at 12:30

## 2020-08-01 RX ADMIN — DOCUSATE SODIUM 100 MG: 100 CAPSULE, LIQUID FILLED ORAL at 09:19

## 2020-08-01 RX ADMIN — STANDARDIZED SENNA CONCENTRATE 17.2 MG: 8.6 TABLET ORAL at 21:20

## 2020-08-01 NOTE — UTILIZATION REVIEW
Initial Clinical Review    OBS order 7/31 1128 converted to IP on 8/1 @1430  Admitting Physician Vicki Hale V    Level of Care Med Surg    Estimated length of stay More than 2 Midnights    Certification I certify that inpatient services are medically necessary for this patient for a duration of greater than two midnights  See H&P and MD Progress Notes for additional information about the patient's course of treatment  ED Arrival Information     Expected Arrival Acuity Means of Arrival Escorted By Service Admission Type    - 7/31/2020 08:35 Emergent Ambulance [facility]) General Medicine Emergency    Arrival Complaint    FALL - HIP PAIN        Chief Complaint   Patient presents with    Fall     pt fell yesterday walking his dog , left sided rib and hip pain , no LOC and on thinners      Assessment/Plan: 82 y o  male who presents presents with history of a fall yesterday evening around 5:30 pm  Reports that he was walking his dog, when she pulled him and he fell over onto his left side   Required help to rise from the ground after his fall   Since this incident, he reports chest pain in his left chest wall which is worsened with deep inspiration   He denies any shortness of breath   He is also complaining of pain in his left hip   He denies any radiation of the chest or hip pain, numbness or tingling in his leg    * Fall  Assessment & Plan  Patient was walking his dog yesterday and tripped over the dog and had a fall resulting in hip pain and side pain  Patient was found to have 5th and 6th rib fractures  Hip x-ray shows replacement with no fracture  CT spine unremarkable  CT of head negative  Pain control  Incentive spirometry  Fall precautions  PT/OT     Rib fracture  Assessment & Plan  Chest CT shows minimally displaced 5th and 6th rib fracture  Rib fracture protocol in place  Incentive spirometry  Pain control     Hypertensive kidney disease with stage 3 chronic kidney disease (HCC)  Assessment & Plan  Patient's creatinine is within baseline  Continue home medications  Avoid nephrotoxins     DM2 (diabetes mellitus, type 2) ([facility])  Assessment & Plan        Lab Results   Component Value Date     HGBA1C 7 2 (A) 01/28/2020         No results for input(s): POCGLU in the last 72 hours      Blood Sugar Average: Last 72 hrs:   carb controlled diet  Sliding scale  Fingersticks           VTE Prophylaxis: Apixaban (Eliquis)  / sequential compression device   Code Status:  Full code  POLST: POLST form is not discussed and not completed at this time  Discussion with family:  Not available at this time     Anticipated Length of Stay:  Patient will be admitted on an Observation basis with an anticipated length of stay of  < 2 midnights     Justification for Hospital Stay:  Pain control, monitoring of respiratory status, PT evaluation, monitoring of bleeding       ED Triage Vitals   Temperature Pulse Respirations Blood Pressure SpO2   07/31/20 0840 07/31/20 0840 07/31/20 0840 07/31/20 0840 07/31/20 0840   98 7 °F (37 1 °C) (!) 47 18 162/72 93 %      Temp Source Heart Rate Source Patient Position - Orthostatic VS BP Location FiO2 (%)   07/31/20 0840 07/31/20 0840 07/31/20 0840 07/31/20 0930 --   Oral Monitor Lying Left arm       Pain Score       07/31/20 1023       9        Wt Readings from Last 1 Encounters:   07/31/20 102 kg (224 lb 13 9 oz)     Additional Vital Signs:   08/01/20 05:52:33    47Abnormal     170/70  103  94 %       08/01/20 02:57:39  98 5 °F (36 9 °C)  33Abnormal     175/70Abnormal   105  96 %       08/01/20 00:01:51  98 7 °F (37 1 °C)  48Abnormal     155/68  97  97 %       07/31/20 19:31:23  98 2 °F (36 8 °C)  51Abnormal   12  160/68  99  96 %       07/31/20 16:03:32  98 5 °F (36 9 °C)  53Abnormal   12  167/65  99  96 %  None (Room air)     07/31/20 1330    49Abnormal   15  188/80Abnormal   115  96 %  None (Room air)  Lying   07/31/20 1230    39Abnormal   18  144/74  121  94 %  None (Room air) Lying   07/31/20 1130    46Abnormal   17  157/69    96 %  None (Room air)  Lying   07/31/20 1030    48Abnormal   16  165/73    95 %    Lying   07/31/20 0930    48Abnormal   21  159/72    94 %  None (Room air         Pertinent Labs/Diagnostic Test Results:     7/31 CT cervical spine   No cervical spine fracture or traumatic malalignment      Moderately advanced multilevel cervical spondylosis  7/31 CT thoracolumbar No fracture or traumatic subluxation  Moderately severe lumbar and milder thoracic degenerative spondylosis is unchanged  7/31 CT chest abd 1  Acute, minimally displaced fractures of the left anterior 5th and 6th ribs  No pneumothorax, pleural effusion or pulmonary contusion      2  No acute solid organ intra-abdominal injury within limitations      3  Limited evaluation of the pelvis due to beam hardening artifact from total hip replacements       4  Urinary bladder wall thickening and suspected prostatomegaly, evaluation limited  Consider element of chronic bladder obstruction      5  Aneurysmal dilatation ascending 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  Dilated main pulmonary artery suggesting pulmonary artery hypertension      6  Colonic diverticulosis  7/31 CT head No acute intracranial abnormality    7/31 L hip xray    Status post bilateral hip arthroplasty without evidence for periprosthetic fracture  7/31 CXR   7/31 EKG  SB   Results from last 7 days   Lab Units 08/01/20  0452 07/31/20  0907   WBC Thousand/uL 6 37 8 04   HEMOGLOBIN g/dL 12 8 12 1   HEMATOCRIT % 39 3 37 1   PLATELETS Thousands/uL 163 169   NEUTROS ABS Thousands/µL 3 49 6 00     Results from last 7 days   Lab Units 08/01/20  0452 07/31/20  0907   SODIUM mmol/L 136 137   POTASSIUM mmol/L 4 6 4 7   CHLORIDE mmol/L 104 104   CO2 mmol/L 24 27   ANION GAP mmol/L 8 6   BUN mg/dL 37* 37*   CREATININE mg/dL 2 09* 1 86*   EGFR ml/min/1 73sq m 29 33   CALCIUM mg/dL 8 9 8 9     Results from last 7 days   Lab Units 07/31/20  0907   AST U/L 16   ALT U/L 18   ALK PHOS U/L 56   TOTAL PROTEIN g/dL 7 3   ALBUMIN g/dL 3 4*   TOTAL BILIRUBIN mg/dL 0 50     Results from last 7 days   Lab Units 07/31/20  2049 07/31/20  1734   POC GLUCOSE mg/dl 213* 218*     Results from last 7 days   Lab Units 08/01/20  0452 07/31/20  0907   GLUCOSE RANDOM mg/dL 134 154*     Results from last 7 days   Lab Units 07/31/20  0907   PROTIME seconds 14 4   INR  1 17   PTT seconds 33     ED Treatment:   Medication Administration from 07/31/2020 0835 to 07/31/2020 1557       Date/Time Order Dose Route Action     07/31/2020 1023 fentanyl citrate (PF) 100 MCG/2ML 50 mcg 50 mcg Intravenous Given     07/31/2020 1518 lidocaine (LIDODERM) 5 % patch 1 patch 1 patch Topical Medication Applied     07/31/2020 1519 acetaminophen (TYLENOL) tablet 975 mg 975 mg Oral Given        Past Medical History:   Diagnosis Date    Acute [facility])     last assessed: 04/15/2016    Aortic valve disorder     Benign prostatic hyperplasia with lower urinary tract symptoms     CAD (coronary artery disease)     Chronic kidney disease     DM2 [facility]diabetes mellitus, type 2) ([facility]     History of colonoscopy     resolved: 05/08/2012    History of transfusion     History of transient cerebral ischemia     HLD (hyperlipidemia)     HTN (hypertension)     Neuralgia      Present on Admission:   [facility]   Hypertensive kidney disease with stage 3 chronic kidney disease ([facility]   Rib fracture   Fall      Admitting Diagnosis: Hip injury [S79 919A]  Left rib fracture [S22 32XA]  Fall, initial encounter [W19  XXXA]  Acute hip pain, left [M25 552]  Age/Sex: 82 y o  male  Admission Orders:  Scheduled Medications:    Medications:  acetaminophen 975 mg Oral Q8H [address]   amLODIPine 10 mg Oral Daily   apixaban 2 5 mg Oral BID   clopidogrel 75 mg Oral Daily   docusate sodium 100 mg Oral BID   doxazosin 8 mg Oral Daily   gabapentin 100 mg Oral BID   insulin lispro 1-6 Units Subcutaneous TID AC   insulin lispro 1-6 Units Subcutaneous HS   isosorbide dinitrate 10 mg Oral BID   lidocaine 1 patch Topical Daily   lisinopril 40 mg Oral Daily     Continuous IV Infusions:     PRN Meds:    hydrALAZINE 5 mg Intravenous Q6H PRN   nitroglycerin 0 4 mg Sublingual Q5 Min PRN   oxyCODONE 5 mg Oral Q4H PRN   oxyCODONE 7 5 mg Oral Q4H PRN       None    Network Utilization Review Department  [email or web address]  ATTENTION: Please call with any questions or concerns to [phone] and carefully listen to the prompts so that you are directed to the right person  All voicemails are confidential   [facility] all requests for admission clinical reviews, approved or denied determinations and any other requests to dedicated fax number below belonging to the campus where the patient is receiving treatment   List of dedicated fax numbers for the Facilities:  [address] [place] [address] DENIALS (Administrative/Medical Necessity) [phone]   [address] (Maternity/NICU/Pediatrics) [phone]   [facility] [phone]     [address]  [phone]   [facility] [phone]   Adonis Guaman [phone]   [address] [facility] [address] [facility] [phone]   [facility]  [phone]   [address] [facility] [address]  [address] [facility] [address] [facility] [phone]

## 2020-08-01 NOTE — PROGRESS NOTES
Progress Note - Ijeoma Cantor 1935, 82 y o  male MRN: [id number]    Unit/Bed#: -01 Encounter: [id number]    Primary Care Provider: Chin Bond DO   Date and time admitted to hospital: 7/31/2020  8:36 AM    * Fall  Assessment & Plan  Patient was walking his dog yesterday and tripped over the dog and had a fall resulting in hip pain and side pain  Patient was found to have 5th and 6th rib fractures  Hip x-ray shows replacement with no fracture  CT spine unremarkable  CT of head negative  Patient is still having significant left hip pain with ambulation, and is unsteady on his feet  Incentive spirometry  Fall precautions  PT/OT short-term rehab however patient is unlikely to go at this time working on setting up home physical therapy    Rib fracture  Assessment & Plan  Chest CT shows minimally displaced 5th and 6th rib fracture  Rib fracture protocol in place  Incentive spirometry  Pain control    Hypertensive kidney disease with stage 3 chronic kidney disease ([address] )  Assessment & Plan  Patient's creatinine is within baseline  Continue home medications  Avoid nephrotoxins    DM2 (diabetes mellitus, type 2) [facility])  Assessment & Plan  Lab Results   Component Value Date    HGBA1C 7 2 (A) 01/28/2020       Recent Labs     07/31/20  1734 07/31/20  2049 08/01/20  0634 08/01/20  1125   POCGLU 218* 213* 145* 227*       Blood Sugar Average: Last 72 hrs:  (P) 200 75 carb controlled diet  Sliding scale  Fingersticks       VTE Pharmacologic Prophylaxis:   Pharmacologic: Apixaban (Eliquis)  Mechanical VTE Prophylaxis in Place: Yes    Patient Centered Rounds: I have performed bedside rounds with nursing staff today  Discussions with Specialists or Other Care Team Provider:  Reviewed notes discussed with case management primary RN    Education and Discussions with Family / Patient:  Discussed plan of care with patient denies any additional questions or concerns at this time    Time Spent for Care: 20 minutes  More than 50% of total time spent on counseling and coordination of care as described above  Current Length of Stay: 0 day(s)    Current Patient Status: Inpatient   Certification Statement: The patient will continue to require additional inpatient hospital stay due to Pain control and assistance with ambulation    Discharge Plan / Estimated Discharge Date:  Next 24 hours      Code Status: Level 1 - Full Code      Subjective:   Denies any chest pain chest tightness shortness of breath or difficulty breathing  Still complaining of left hip pain and difficulty ambulating secondary to this pain encouraged use of pain medication and ambulation discussed rehab with patient however he is not convinced    Objective:     Vitals:   Temp (24hrs), Av 4 °F (36 9 °C), Min:98 °F (36 7 °C), Max:98 7 °F (37 1 °C)    Temp:  [98 °F (36 7 °C)-98 7 °F (37 1 °C)] 98 °F (36 7 °C)  HR:  [33-53] 50  Resp:  [12-16] 16  BP: (155-175)/(65-88) 155/81  SpO2:  [94 %-97 %] 94 %  Body mass index is 33 21 kg/m²  Input and Output Summary (last 24 hours): Intake/Output Summary (Last 24 hours) at 2020 1432  Last data filed at 2020 8567  Gross per 24 hour   Intake 520 ml   Output 600 ml   Net -80 ml       Physical Exam:     Physical Exam   Constitutional: He is oriented to person, place, and time  He appears well-developed and well-nourished  HENT:   Head: Normocephalic  Eyes: Pupils are equal, round, and reactive to light  Neck: Normal range of motion  Cardiovascular: Normal rate  Pulmonary/Chest: Effort normal    Abdominal: Soft  Musculoskeletal: Normal range of motion  He exhibits edema and tenderness  Neurological: He is alert and oriented to person, place, and time  Skin: Skin is warm  Psychiatric: He has a normal mood and affect  His behavior is normal  Judgment and thought content normal    Nursing note and vitals reviewed    Additional Data:     Labs:    Results from last 7 days   Lab Units 08/01/20  0452   WBC Thousand/uL 6 37   HEMOGLOBIN g/dL 12 8   HEMATOCRIT % 39 3   PLATELETS Thousands/uL 163   NEUTROS PCT % 55   LYMPHS PCT % 32   MONOS PCT % 7   EOS PCT % 5     Results from last 7 days   Lab Units 08/01/20  0452 07/31/20  0907   POTASSIUM mmol/L 4 6 4 7   CHLORIDE mmol/L 104 104   CO2 mmol/L 24 27   BUN mg/dL 37* 37*   CREATININE mg/dL 2 09* 1 86*   CALCIUM mg/dL 8 9 8 9   ALK PHOS U/L  --  56   ALT U/L  --  18   AST U/L  --  16     Results from last 7 days   Lab Units 07/31/20  0907   INR  1 17       * I Have Reviewed All Lab Data Listed Above  * Additional Pertinent Lab Tests Reviewed: [address] [address] Admission Reviewed    Recent Cultures (last 7 days):           Last 24 Hours Medication List:     Current Facility-Administered Medications:  acetaminophen 975 mg Oral Q8H [address] Randa Duke, JALEEL   amLODIPine 10 mg Oral Daily Loulou Vivian, CRNP   apixaban 2 5 mg Oral BID Loulou Vivian, CRNP   clopidogrel 75 mg Oral Daily Loulou Vivian, CRNP   docusate sodium 100 mg Oral BID Loulou Vivian, CRNP   doxazosin 8 mg Oral Daily Loulou Vivian, CRNP   gabapentin 100 mg Oral BID Loulou Vivian, CRNP   hydrALAZINE 5 mg Intravenous Q6H PRN Loulou Vivian, CRNP   insulin lispro 1-6 Units Subcutaneous TID AC Randa Duke, CRNP   insulin lispro 1-6 Units Subcutaneous HS Loulou Vivian, CRNP   isosorbide dinitrate 10 mg Oral BID Loulou Vivian, CRNP   lidocaine 1 patch Topical Daily Loulou Vivian, CRNP   lisinopril 40 mg Oral Daily Loulou Vivian, CRNP   nitroglycerin 0 4 mg Sublingual Q5 Min PRN Loulou Vivian, CRNP   oxyCODONE 5 mg Oral Q4H PRN Loulou Vivian, CRNP   oxyCODONE 7 5 mg Oral Q4H PRN Loulou Vivian, CRNP        Today, Patient Was Seen By: JALEEL Aden    ** Please Note: Dragon 360 Dictation voice to text software may have been used in the creation of this document   **

## 2020-08-01 NOTE — PLAN OF CARE
Problem: Potential for Falls  Goal: Patient will remain free of falls  Description  INTERVENTIONS:  - Assess patient frequently for physical needs  -  Identify cognitive and physical deficits and behaviors that affect risk of falls    -  [place] fall precautions as indicated by assessment   - Educate patient/family on patient safety including physical limitations  - Instruct patient to call for assistance with activity based on assessment  - Modify environment to reduce risk of injury  - Consider OT/PT consult to assist with strengthening/mobility  7/31/2020 2206 by Rachel Beverly  Outcome: Progressing  7/31/2020 2103 by Rachel Beverly  Outcome: Progressing     Problem: PAIN - ADULT  Goal: Verbalizes/displays adequate comfort level or baseline comfort level  Description  Interventions:  - Encourage patient to monitor pain and request assistance  - Assess pain using appropriate pain scale  - Administer analgesics based on type and severity of pain and evaluate response  - Implement non-pharmacological measures as appropriate and evaluate response  - Consider cultural and social influences on pain and pain management  - Notify physician/advanced practitioner if interventions unsuccessful or patient reports new pain  7/31/2020 2206 by Rachel Beverly  Outcome: Progressing  7/31/2020 2103 by Rachel Beverly  Outcome: Progressing     Problem: INFECTION - ADULT  Goal: Absence or prevention of progression during hospitalization  Description  INTERVENTIONS:  - Assess and monitor for signs and symptoms of infection  - Monitor lab/diagnostic results  - Monitor all insertion sites, i e  indwelling lines, tubes, and drains  - Monitor endotracheal if appropriate and nasal secretions for changes in amount and color  - Thompson appropriate cooling/warming therapies per order  - Administer medications as ordered  - Instruct and encourage patient and family to use good hand hygiene technique  - Identify and instruct in appropriate isolation precautions for identified infection/condition  7/31/2020 2206 by Emperatriz Brown  Outcome: Progressing  7/31/2020 2103 by Emperatriz Brown  Outcome: Progressing  Goal: Absence of fever/infection during neutropenic period  Description  INTERVENTIONS:  - Monitor WBC    7/31/2020 2206 by Emperatriz Brown  Outcome: Progressing  7/31/2020 2103 by Emperatriz Brown  Outcome: Progressing     Problem: SAFETY ADULT  Goal: Maintain or return to baseline ADL function  Description  INTERVENTIONS:  -  Assess patient's ability to carry out ADLs; assess patient's baseline for ADL function and identify physical deficits which impact ability to perform ADLs (bathing, care of mouth/teeth, toileting, grooming, dressing, etc )  - Assess/evaluate cause of self-care deficits   - Assess range of motion  - Assess patient's mobility; develop plan if impaired  - Assess patient's need for assistive devices and provide as appropriate  - Encourage maximum independence but intervene and supervise when necessary  - Involve family in performance of ADLs  - Assess for home care needs following discharge   - Consider OT consult to assist with ADL evaluation and planning for discharge  - Provide patient education as appropriate  7/31/2020 2206 by Emperatriz Brown  Outcome: Progressing  7/31/2020 2103 by Emperatriz Brown  Outcome: Progressing  Goal: Maintain or return mobility status to optimal level  Description  INTERVENTIONS:  - Assess patient's baseline mobility status (ambulation, transfers, stairs, etc )    - Identify cognitive and physical deficits and behaviors that affect mobility  - Identify mobility aids required to assist with transfers and/or ambulation (gait belt, sit-to-stand, lift, walker, cane, etc )  - [place] fall precautions as indicated by assessment  - Record patient progress and toleration of activity level on Mobility SBAR; progress patient to next Phase/Stage  - Instruct patient to call for assistance with activity based on assessment  - Consider rehabilitation consult to assist with strengthening/weightbearing, etc   7/31/2020 2206 by Stephanie Kessler  Outcome: Progressing  7/31/2020 2103 by Stephanie Kessler  Outcome: Progressing     Problem: DISCHARGE PLANNING  Goal: Discharge to home or other facility with appropriate resources  Description  INTERVENTIONS:  - Identify barriers to discharge w/patient and caregiver  - Arrange for needed discharge resources and transportation as appropriate  - Identify discharge learning needs (meds, wound care, etc )  - Arrange for interpretive services to assist at discharge as needed  - Refer to Case Management Department for coordinating discharge planning if the patient needs post-hospital services based on physician/advanced practitioner order or complex needs related to functional status, cognitive ability, or social support system  7/31/2020 2206 by Stephanie Kessler  Outcome: Progressing  7/31/2020 2103 by Stephanie Kessler  Outcome: Progressing     Problem: Knowledge Deficit  Goal: Patient/family/caregiver demonstrates understanding of disease process, treatment plan, medications, and discharge instructions  Description  Complete learning assessment and assess knowledge base    Interventions:  - Provide teaching at level of understanding  - Provide teaching via preferred learning methods  7/31/2020 2206 by Stephanie Kessler  Outcome: Progressing  7/31/2020 2103 by Stephanie Kessler  Outcome: Progressing

## 2020-08-01 NOTE — SOCIAL WORK
CM spoke to pt regarding Pt recommendations  Pt adamantly declines short term rehab, wants to go to home w/ [facility]  Referral in [place]  A post acute care recommendation was made by your care team for [facility]  Discussed [place] of Choice with patient  List of agencies given to patient via in person  patient aware the list is custom filtered for them by preference  and that [facility]'s post acute providers are designated

## 2020-08-01 NOTE — ASSESSMENT & PLAN NOTE
Patient was walking his dog yesterday and tripped over the dog and had a fall resulting in hip pain and side pain  Patient was found to have 5th and 6th rib fractures  Hip x-ray shows replacement with no fracture  CT spine unremarkable  CT of head negative  Patient is still having significant left hip pain with ambulation, and is unsteady on his feet  Incentive spirometry  Fall precautions  PT/OT short-term rehab however patient is unlikely to go at this time working on setting up home physical therapy

## 2020-08-01 NOTE — PHYSICAL THERAPY NOTE
Physical Therapy Evaluation     Patient's Name: Candace Montenegrin    Admitting Diagnosis  Hip injury [R05 961E]  Left rib fracture Gosia Khalil  Fall, initial encounter [W19  XXXA]  Acute hip pain, left [M25 552]    Problem List  Patient Active Problem List   Diagnosis    Stroke ([facility] )    HTN (hypertension)    DM2 [facility]diabetes mellitus, type 2) [facility] )    HLD (hyperlipidemia)    CAD (coronary artery disease)    Right hip pain    Acute deep vein thrombosis (DVT) of brachial vein of left upper extremity (HCC)    At risk for polypharmacy    Acute kidney injury ([facility] )    Encounter for monitoring antiplatelet therapy    Nonrheumatic aortic valve stenosis    Stage 3 chronic kidney disease ([facility] )    History of CVA (cerebrovascular accident)    Atrial fibrillation ([facility] )    Hemiplegia and 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-dominant side (HCC)    Type 2 diabetes mellitus without complication ([facility] )    Type 2 diabetes mellitus with kidney complication, without long-term current use of insulin ([facility]  )    Poorly-controlled hypertension    Hypertensive kidney disease with stage 3 chronic kidney disease ([facility] )    Other proteinuria    Memory difficulty    Peroneal DVT (deep venous thrombosis), left (HCC)    Vitamin D deficiency    Basilar artery stenosis    Postural dizziness with presyncope    Symptomatic bradycardia    Rib fracture    Fall       Past Medical History  Past Medical History:   Diagnosis Date    Acute ST elevation myocardial infarction [facility])     last assessed: 04/15/2016    Aortic valve disorder     Benign prostatic hyperplasia with lower urinary tract symptoms     CAD (coronary artery disease)     Chronic kidney disease     DM2 (diabetes mellitus, type 2) ([facility] )     History of colonoscopy     resolved: 05/08/2012    History of transfusion     History of transient cerebral ischemia     HLD (hyperlipidemia)     HTN (hypertension)     Neuralgia        Past Surgical History  Past Surgical History:   Procedure Laterality Date    CARDIAC CATHETERIZATION      Outcome: successful; last assessed: 02/03/2015    CARDIAC CATHETERIZATION  11/26/2019    CORONARY ANGIOPLASTY WITH STENT PLACEMENT  2008    stent to LAD     HAND SURGERY      thumb    HIP HARDWARE REMOVAL      TOTAL HIP ARTHROPLASTY Right     TOTAL HIP ARTHROPLASTY Bilateral           08/01/20 0817   Note Type   Note type Eval only   Pain Assessment   Pain Assessment Tool 0-10   Pain Score 9   Pain Location/Orientation Orientation: Left; Location: Rib Cage   Pain Onset/Description Onset: Ongoing;Frequency: Constant/Continuous   Hospital Pain Intervention(s) Repositioned; Ambulation/increased activity   Multiple Pain Sites Yes   Pain 2   Pain Score 2 9   Pain Location/Orientation 2 Orientation: Left; Location: Hip   Hospital Pain Intervention(s) 2 Repositioned; Ambulation/increased activity   Home Living   Type of [address] [place] [address] One level;Stairs to enter with rails; Performs ADLs on one level; Able to live on main level with bedroom/bathroom  (4 DILMA)   Bathroom Shower/Tub Walk-in shower   Bathroom Toilet Standard   Bathroom Equipment Grab bars in shower   [address] Walker;Cane;Crutches   Additional Comments not using AD at baseline   Prior Function   Level of [place] Independent with ADLs and functional mobility   Lives With Significant other   Receives Help From Family  (Significant other)   ADL Assistance Independent   IADLs Needs assistance   Falls in the last 6 months 1 to 4   Vocational Retired   Comments patient drives   Restrictions/Precautions   [place] Bearing Precautions Per Order No   Braces or Orthoses Other (Comment)  (none reported)   Other Precautions Bed Alarm; Chair Alarm; Fall Risk;Pain;Multiple lines  (acute, minimally displaced fractures of the L 5th & 6th ribs)   General   Family/Caregiver Present No   Cognition   Overall Cognitive Status [facility] Arousal/Participation Cooperative   Orientation Level Oriented X4   Memory Within functional limits   Following Commands Follows all commands and directions without difficulty   Comments patient agreeable to PT eval   RUE Assessment   RUE Assessment WFL  (based on functional assessment)   LUE Assessment   LUE Assessment WFL  (based on functional assessment)   RLE Assessment   RLE Assessment WFL  (grossly assessed with functional mobility: at least 3+/5)   LLE Assessment   LLE Assessment WFL  (grossly assessed with functional mobility: at least 3+/5)   Coordination   Movements are Fluid and Coordinated 1   Sensation [facility]   Light Touch   RLE Light Touch Grossly intact   LLE Light Touch Grossly intact   Bed Mobility   Rolling R 4  Minimal assistance   Additional items Assist x 1; Increased time required;Verbal cues;LE management;HOB elevated; Bedrails   Supine to Sit 4  Minimal assistance   Additional items Assist x 1; Increased time required;Verbal cues;LE management;HOB elevated   Transfers   Sit to Stand 4  Minimal assistance   Additional items Assist x 1; Increased time required;Verbal cues   Stand to Sit 4  Minimal assistance   Additional items Assist x 1; Increased time required;Verbal cues;Armrests   Ambulation/Elevation   Gait pattern Excessively slow; Step to;Short stride;Decreased L stance;Decreased foot clearance; Improper Weight shift; Antalgic   Gait Assistance 4  Minimal assist   Additional items Assist x 1;Verbal cues   Assistive Device Rolling walker   Distance 3' bed to chair   Stair Management Assistance Not tested   Balance   Static Sitting Good   Dynamic Sitting Fair +   Static Standing Fair   Dynamic Standing Fair -   Ambulatory Poor +   Endurance Deficit   Endurance Deficit Yes   Activity Tolerance   Activity Tolerance Patient limited by pain; Patient limited by fatigue   Nurse Made Aware JELENA Rg confirmed patient appropriate for therapy; made aware of session outcomes; post-session: patient seated OOB in recliner chair, all needs within reach and chair alarm engaged   Assessment   Prognosis Good   Problem List Decreased strength;Decreased endurance;Decreased mobility; Impaired balance;Pain   Assessment Pt is 82 y o  male seen for PT evaluation s/p admit to [facility] on 2020 w/ Fall  PT consulted to assess pt's functional mobility and d/c needs  Order placed for PT eval and tx, w/ up and OOB as tolerated order  Performed at least 2 patient identifiers during session: Name and   Comorbidities affecting pt's physical performance at time of assessment include: rib fracture, hypertensive kidney disase with stage 3 CKD, DM2, history of CVA, history of hemiplegia and 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-dominant side, history of memory difficulty  PTA, pt was independent w/ all functional mobility w/ no AD, ambulates community distances and elevations, has 4 DILMA, lives w/ significant other in one level house and retired  Personal factors affecting pt at time of IE include: ambulating w/ assistive device, stairs to enter home, inability to ambulate household distances, inability to navigate community distances, inability to navigate level surfaces w/o external assistance, unable to perform dynamic tasks in community, positive fall history, inability to perform IADLs and inability to perform ADLs  Please find objective findings from PT assessment regarding body systems outlined above with impairments and limitations including weakness, impaired balance, decreased endurance, gait deviations, pain, decreased activity tolerance, decreased functional mobility tolerance and fall risk  The following objective measures performed on IE also reveal limitations: Barthel Index: 50/100 and Modified Payne: 4 (moderate/severe disability)   Pt's clinical presentation is currently unstable/unpredictable seen in pt's presentation of ongoing medical management/monitoring, evident in need for assist w/ all phases of mobility when usually mobilizing independently, and fatigue and pain impacting overall mobility status  Pt to benefit from continued PT tx to address deficits as defined above and maximize level of functional independent mobility and consistency  From PT/mobility standpoint, recommendation at time of d/c would be STR pending progress in order to facilitate return to PLOF  Barriers to Discharge Inaccessible home environment   Goals   Patient Goals "to mow the grass"   STG Expiration Date 08/11/20   Short Term Goal #1 In 7-10 days: Increase bilateral LE strength 1/2 grade to facilitate independent mobility, Perform all bed mobility tasks modified independent to decrease caregiver burden, Perform all transfers modified independent to improve independence, Ambulate > 150 ft  with least restrictive assistive device modified independent w/o LOB and w/ normalized gait pattern 100% of the time, Navigate 4 stairs modified independent with unilateral handrail to facilitate return to previous living environment, Increase all balance 1 grade to decrease risk for falls, Complete exercise program independently and Tolerate 4 hr OOB to faciliate upright tolerance   PT Treatment Day 0   Plan   Treatment/Interventions Functional transfer training;LE strengthening/ROM; Elevations; Therapeutic exercise; Endurance training;Equipment eval/education;Patient/family training;Bed mobility;Gait training;Spoke to nursing   PT Frequency 5x/wk   Recommendation   PT Discharge Recommendation Post-Acute Rehabilitation Services   PT - OK to Discharge Yes  (when medically cleared; if to STR)   Modified [place] Scale   Modified [place] Scale 4   Barthel Index   Feeding 10   Bathing 0   Grooming Score 0   Dressing Score 5   Bladder Score 10   Bowels Score 10   Toilet Use Score 5   Transfers (Bed/Chair) Score 10   Mobility (Level Surface) Score 0   Stairs Score 0   Barthel Index Score 50         Maritza Sparrow, PT, DPT

## 2020-08-01 NOTE — PLAN OF CARE
Problem: PHYSICAL THERAPY ADULT  Goal: Performs mobility at highest level of function for planned discharge setting  See evaluation for individualized goals  Description  Treatment/Interventions: Functional transfer training, LE strengthening/ROM, Elevations, Therapeutic exercise, Endurance training, Equipment eval/education, Patient/family training, Bed mobility, Gait training, Spoke to nursing          See flowsheet documentation for full assessment, interventions and recommendations  Note:   Prognosis: Good  Problem List: Decreased strength, Decreased endurance, Decreased mobility, Impaired balance, Pain  Assessment: Pt is 82 y o  male seen for PT evaluation s/p admit to [facility] on 2020 w/ Fall  PT consulted to assess pt's functional mobility and d/c needs  Order placed for PT eval and tx, w/ up and OOB as tolerated order  Performed at least 2 patient identifiers during session: Name and   Comorbidities affecting pt's physical performance at time of assessment include: rib fracture, hypertensive kidney disase with stage 3 CKD, DM2, history of CVA, history of hemiplegia and 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-dominant side, history of memory difficulty  PTA, pt was independent w/ all functional mobility w/ no AD, ambulates community distances and elevations, has 4 DILMA, lives w/ significant other in one level house and retired  Personal factors affecting pt at time of IE include: ambulating w/ assistive device, stairs to enter home, inability to ambulate household distances, inability to navigate community distances, inability to navigate level surfaces w/o external assistance, unable to perform dynamic tasks in community, positive fall history, inability to perform IADLs and inability to perform ADLs   Please find objective findings from PT assessment regarding body systems outlined above with impairments and limitations including weakness, impaired balance, decreased endurance, gait deviations, pain, decreased activity tolerance, decreased functional mobility tolerance and fall risk  The following objective measures performed on IE also reveal limitations: Barthel Index: 50/100 and Modified Pratt: 4 (moderate/severe disability)  Pt's clinical presentation is currently unstable/unpredictable seen in pt's presentation of ongoing medical management/monitoring, evident in need for assist w/ all phases of mobility when usually mobilizing independently, and fatigue and pain impacting overall mobility status  Pt to benefit from continued PT tx to address deficits as defined above and maximize level of functional independent mobility and consistency  From PT/mobility standpoint, recommendation at time of d/c would be STR pending progress in order to facilitate return to PLOF  Barriers to Discharge: Inaccessible home environment     PT Discharge Recommendation: [address]     PT - OK to Discharge: Yes(when medically cleared; if to STR)    See flowsheet documentation for full assessment

## 2020-08-01 NOTE — PLAN OF CARE
Problem: Potential for Falls  Goal: Patient will remain free of falls  Description  INTERVENTIONS:  - Assess patient frequently for physical needs  -  Identify cognitive and physical deficits and behaviors that affect risk of falls    -  [place] fall precautions as indicated by assessment   - Educate patient/family on patient safety including physical limitations  - Instruct patient to call for assistance with activity based on assessment  - Modify environment to reduce risk of injury  - Consider OT/PT consult to assist with strengthening/mobility  Outcome: Progressing     Problem: PAIN - ADULT  Goal: Verbalizes/displays adequate comfort level or baseline comfort level  Description  Interventions:  - Encourage patient to monitor pain and request assistance  - Assess pain using appropriate pain scale  - Administer analgesics based on type and severity of pain and evaluate response  - Implement non-pharmacological measures as appropriate and evaluate response  - Consider cultural and social influences on pain and pain management  - Notify physician/advanced practitioner if interventions unsuccessful or patient reports new pain  Outcome: Progressing     Problem: INFECTION - ADULT  Goal: Absence or prevention of progression during hospitalization  Description  INTERVENTIONS:  - Assess and monitor for signs and symptoms of infection  - Monitor lab/diagnostic results  - Monitor all insertion sites, i e  indwelling lines, tubes, and drains  - Monitor endotracheal if appropriate and nasal secretions for changes in amount and color  - [place] appropriate cooling/warming therapies per order  - Administer medications as ordered  - Instruct and encourage patient and family to use good hand hygiene technique  - Identify and instruct in appropriate isolation precautions for identified infection/condition  Outcome: Progressing  Goal: Absence of fever/infection during neutropenic period  Description  INTERVENTIONS:  - Monitor WBC    Outcome: Progressing     Problem: SAFETY ADULT  Goal: Maintain or return to baseline ADL function  Description  INTERVENTIONS:  -  Assess patient's ability to carry out ADLs; assess patient's baseline for ADL function and identify physical deficits which impact ability to perform ADLs (bathing, care of mouth/teeth, toileting, grooming, dressing, etc )  - Assess/evaluate cause of self-care deficits   - Assess range of motion  - Assess patient's mobility; develop plan if impaired  - Assess patient's need for assistive devices and provide as appropriate  - Encourage maximum independence but intervene and supervise when necessary  - Involve family in performance of ADLs  - Assess for home care needs following discharge   - Consider OT consult to assist with ADL evaluation and planning for discharge  - Provide patient education as appropriate  Outcome: Progressing  Goal: Maintain or return mobility status to optimal level  Description  INTERVENTIONS:  - Assess patient's baseline mobility status (ambulation, transfers, stairs, etc )    - Identify cognitive and physical deficits and behaviors that affect mobility  - Identify mobility aids required to assist with transfers and/or ambulation (gait belt, sit-to-stand, lift, walker, cane, etc )  - [place] fall precautions as indicated by assessment  - Record patient progress and toleration of activity level on Mobility SBAR; progress patient to next Phase/Stage  - Instruct patient to call for assistance with activity based on assessment  - Consider rehabilitation consult to assist with strengthening/weightbearing, etc   Outcome: Progressing     Problem: DISCHARGE PLANNING  Goal: Discharge to home or other facility with appropriate resources  Description  INTERVENTIONS:  - Identify barriers to discharge w/patient and caregiver  - Arrange for needed discharge resources and transportation as appropriate  - Identify discharge learning needs (meds, wound care, etc )  - Arrange for interpretive services to assist at discharge as needed  - Refer to Case Management Department for coordinating discharge planning if the patient needs post-hospital services based on physician/advanced practitioner order or complex needs related to functional status, cognitive ability, or social support system  Outcome: Progressing     Problem: Knowledge Deficit  Goal: Patient/family/caregiver demonstrates understanding of disease process, treatment plan, medications, and discharge instructions  Description  Complete learning assessment and assess knowledge base    Interventions:  - Provide teaching at level of understanding  - Provide teaching via preferred learning methods  Outcome: Progressing

## 2020-08-01 NOTE — PLAN OF CARE
Problem: Potential for Falls  Goal: Patient will remain free of falls  Description  INTERVENTIONS:  - Assess patient frequently for physical needs  -  Identify cognitive and physical deficits and behaviors that affect risk of falls    -  [place] fall precautions as indicated by assessment   - Educate patient/family on patient safety including physical limitations  - Instruct patient to call for assistance with activity based on assessment  - Modify environment to reduce risk of injury  - Consider OT/PT consult to assist with strengthening/mobility  Outcome: Progressing     Problem: PAIN - ADULT  Goal: Verbalizes/displays adequate comfort level or baseline comfort level  Description  Interventions:  - Encourage patient to monitor pain and request assistance  - Assess pain using appropriate pain scale  - Administer analgesics based on type and severity of pain and evaluate response  - Implement non-pharmacological measures as appropriate and evaluate response  - Consider cultural and social influences on pain and pain management  - Notify physician/advanced practitioner if interventions unsuccessful or patient reports new pain  Outcome: Progressing     Problem: INFECTION - ADULT  Goal: Absence or prevention of progression during hospitalization  Description  INTERVENTIONS:  - Assess and monitor for signs and symptoms of infection  - Monitor lab/diagnostic results  - Monitor all insertion sites, i e  indwelling lines, tubes, and drains  - Monitor endotracheal if appropriate and nasal secretions for changes in amount and color  - [place] appropriate cooling/warming therapies per order  - Administer medications as ordered  - Instruct and encourage patient and family to use good hand hygiene technique  - Identify and instruct in appropriate isolation precautions for identified infection/condition  Outcome: Progressing  Goal: Absence of fever/infection during neutropenic period  Description  INTERVENTIONS:  - Monitor WBC    Outcome: Progressing     Problem: SAFETY ADULT  Goal: Maintain or return to baseline ADL function  Description  INTERVENTIONS:  -  Assess patient's ability to carry out ADLs; assess patient's baseline for ADL function and identify physical deficits which impact ability to perform ADLs (bathing, care of mouth/teeth, toileting, grooming, dressing, etc )  - Assess/evaluate cause of self-care deficits   - Assess range of motion  - Assess patient's mobility; develop plan if impaired  - Assess patient's need for assistive devices and provide as appropriate  - Encourage maximum independence but intervene and supervise when necessary  - Involve family in performance of ADLs  - Assess for home care needs following discharge   - Consider OT consult to assist with ADL evaluation and planning for discharge  - Provide patient education as appropriate  Outcome: Progressing  Goal: Maintain or return mobility status to optimal level  Description  INTERVENTIONS:  - Assess patient's baseline mobility status (ambulation, transfers, stairs, etc )    - Identify cognitive and physical deficits and behaviors that affect mobility  - Identify mobility aids required to assist with transfers and/or ambulation (gait belt, sit-to-stand, lift, walker, cane, etc )  - [place] fall precautions as indicated by assessment  - Record patient progress and toleration of activity level on Mobility SBAR; progress patient to next Phase/Stage  - Instruct patient to call for assistance with activity based on assessment  - Consider rehabilitation consult to assist with strengthening/weightbearing, etc   Outcome: Progressing     Problem: DISCHARGE PLANNING  Goal: Discharge to home or other facility with appropriate resources  Description  INTERVENTIONS:  - Identify barriers to discharge w/patient and caregiver  - Arrange for needed discharge resources and transportation as appropriate  - Identify discharge learning needs (meds, wound care, etc )  - Arrange for interpretive services to assist at discharge as needed  - Refer to Case Management Department for coordinating discharge planning if the patient needs post-hospital services based on physician/advanced practitioner order or complex needs related to functional status, cognitive ability, or social support system  Outcome: Progressing     Problem: Knowledge Deficit  Goal: Patient/family/caregiver demonstrates understanding of disease process, treatment plan, medications, and discharge instructions  Description  Complete learning assessment and assess knowledge base    Interventions:  - Provide teaching at level of understanding  - Provide teaching via preferred learning methods  Outcome: Progressing

## 2020-08-01 NOTE — ASSESSMENT & PLAN NOTE
Lab Results   Component Value Date    HGBA1C 7 2 (A) 01/28/2020       Recent Labs     07/31/20  1734 07/31/20  2049 08/01/20  0634 08/01/20  1125   POCGLU 218* 213* 145* 227*       Blood Sugar Average: Last 72 hrs:  (P) 200 75 carb controlled diet  Sliding scale  Fingersticks

## 2020-08-02 LAB
ANION GAP SERPL CALCULATED.3IONS-SCNC: 5 MMOL/L (ref 4–13)
BUN SERPL-MCNC: 36 MG/DL (ref 5–25)
CALCIUM SERPL-MCNC: 8.9 MG/DL (ref 8.3–10.1)
CHLORIDE SERPL-SCNC: 105 MMOL/L (ref 100–108)
CO2 SERPL-SCNC: 26 MMOL/L (ref 21–32)
CREAT SERPL-MCNC: 1.71 MG/DL (ref 0.6–1.3)
ERYTHROCYTE [DISTWIDTH] IN BLOOD BY AUTOMATED COUNT: 12.8 % (ref 11.6–15.1)
GFR SERPL CREATININE-BSD FRML MDRD: 36 ML/MIN/1.73SQ M
GLUCOSE SERPL-MCNC: 142 MG/DL (ref 65–140)
GLUCOSE SERPL-MCNC: 170 MG/DL (ref 65–140)
GLUCOSE SERPL-MCNC: 193 MG/DL (ref 65–140)
GLUCOSE SERPL-MCNC: 204 MG/DL (ref 65–140)
GLUCOSE SERPL-MCNC: 224 MG/DL (ref 65–140)
HCT VFR BLD AUTO: 39.1 % (ref 36.5–49.3)
HGB BLD-MCNC: 12.7 G/DL (ref 12–17)
MCH RBC QN AUTO: 30.9 PG (ref 26.8–34.3)
MCHC RBC AUTO-ENTMCNC: 32.5 G/DL (ref 31.4–37.4)
MCV RBC AUTO: 95 FL (ref 82–98)
PLATELET # BLD AUTO: 154 THOUSANDS/UL (ref 149–390)
PMV BLD AUTO: 10 FL (ref 8.9–12.7)
POTASSIUM SERPL-SCNC: 4.3 MMOL/L (ref 3.5–5.3)
RBC # BLD AUTO: 4.11 MILLION/UL (ref 3.88–5.62)
SODIUM SERPL-SCNC: 136 MMOL/L (ref 136–145)
WBC # BLD AUTO: 6.59 THOUSAND/UL (ref 4.31–10.16)

## 2020-08-02 PROCEDURE — 85027 COMPLETE CBC AUTOMATED: CPT | Performed by: NURSE PRACTITIONER

## 2020-08-02 PROCEDURE — 82948 REAGENT STRIP/BLOOD GLUCOSE: CPT

## 2020-08-02 PROCEDURE — 99232 SBSQ HOSP IP/OBS MODERATE 35: CPT | Performed by: NURSE PRACTITIONER

## 2020-08-02 PROCEDURE — 80048 BASIC METABOLIC PNL TOTAL CA: CPT | Performed by: NURSE PRACTITIONER

## 2020-08-02 RX ADMIN — APIXABAN 2.5 MG: 2.5 TABLET, FILM COATED ORAL at 17:53

## 2020-08-02 RX ADMIN — INSULIN LISPRO 2 UNITS: 100 INJECTION, SOLUTION INTRAVENOUS; SUBCUTANEOUS at 21:39

## 2020-08-02 RX ADMIN — APIXABAN 2.5 MG: 2.5 TABLET, FILM COATED ORAL at 09:57

## 2020-08-02 RX ADMIN — LIDOCAINE 1 PATCH: 50 PATCH CUTANEOUS at 09:57

## 2020-08-02 RX ADMIN — INSULIN LISPRO 2 UNITS: 100 INJECTION, SOLUTION INTRAVENOUS; SUBCUTANEOUS at 17:00

## 2020-08-02 RX ADMIN — DOCUSATE SODIUM 100 MG: 100 CAPSULE, LIQUID FILLED ORAL at 17:53

## 2020-08-02 RX ADMIN — LISINOPRIL 40 MG: 20 TABLET ORAL at 09:58

## 2020-08-02 RX ADMIN — ISOSORBIDE DINITRATE 10 MG: 10 TABLET ORAL at 17:53

## 2020-08-02 RX ADMIN — DOCUSATE SODIUM 100 MG: 100 CAPSULE, LIQUID FILLED ORAL at 09:59

## 2020-08-02 RX ADMIN — ACETAMINOPHEN 975 MG: 325 TABLET, FILM COATED ORAL at 12:32

## 2020-08-02 RX ADMIN — STANDARDIZED SENNA CONCENTRATE 17.2 MG: 8.6 TABLET ORAL at 21:39

## 2020-08-02 RX ADMIN — AMLODIPINE BESYLATE 10 MG: 10 TABLET ORAL at 09:58

## 2020-08-02 RX ADMIN — INSULIN LISPRO 2 UNITS: 100 INJECTION, SOLUTION INTRAVENOUS; SUBCUTANEOUS at 12:30

## 2020-08-02 RX ADMIN — GABAPENTIN 100 MG: 100 CAPSULE ORAL at 09:57

## 2020-08-02 RX ADMIN — ISOSORBIDE DINITRATE 10 MG: 10 TABLET ORAL at 09:58

## 2020-08-02 RX ADMIN — ACETAMINOPHEN 975 MG: 325 TABLET, FILM COATED ORAL at 06:06

## 2020-08-02 RX ADMIN — GABAPENTIN 100 MG: 100 CAPSULE ORAL at 17:53

## 2020-08-02 RX ADMIN — ACETAMINOPHEN 975 MG: 325 TABLET, FILM COATED ORAL at 21:39

## 2020-08-02 RX ADMIN — CLOPIDOGREL BISULFATE 75 MG: 75 TABLET ORAL at 09:57

## 2020-08-02 RX ADMIN — DOXAZOSIN 8 MG: 4 TABLET ORAL at 09:58

## 2020-08-02 NOTE — ASSESSMENT & PLAN NOTE
Lab Results   Component Value Date    HGBA1C 7 2 (A) 01/28/2020       Recent Labs     08/01/20  1613 08/01/20  2116 08/02/20  0632 08/02/20  1040   POCGLU 183* 154* 142* 204*       Blood Sugar Average: Last 72 hrs:  (P) 185 75 carb controlled diet  Sliding scale  Fingersticks

## 2020-08-02 NOTE — PROGRESS NOTES
Progress Note - Mikey Claros 1935, 82 y o  male MRN: [id number]    Unit/Bed#: -[facility] Encounter: [id number]    Primary Care Provider: Wallace Armendariz DO   Date and time admitted to hospital: 7/31/2020  8:36 AM    * Fall  Assessment & Plan  Patient was walking his dog and tripped over the dog and had a fall resulting in hip pain and side pain  Patient was found to have 5th and 6th rib fractures  Hip x-ray shows replacement with no fracture  CT spine unremarkable  CT of head negative   Patient is still having significant left hip pain with ambulation, and is unsteady on his feet  Incentive spirometry  Fall precautions  PT/OT short-term rehab has been recommended however patient is refusing, will continue to talk to family    Rib fracture  Assessment & Plan  Chest CT shows minimally displaced 5th and 6th rib fracture  Rib fracture protocol in place  Incentive spirometry  Pain control    Hypertensive kidney disease with stage 3 chronic kidney disease ([facility] [address] )  Assessment & Plan  Patient's creatinine is within baseline  Continue home medications  Avoid nephrotoxins    DM2 (diabetes mellitus, type 2) ([facility] )  Assessment & Plan  Lab Results   Component Value Date    HGBA1C 7 2 (A) 01/28/2020       Recent Labs     08/01/20  1613 08/01/20  2116 08/02/20  0632 08/02/20  1040   POCGLU 183* 154* 142* 204*       Blood Sugar Average: Last 72 hrs:  (P) 185 75 carb controlled diet  Sliding scale  Fingersticks       VTE Pharmacologic Prophylaxis:   Pharmacologic: Apixaban (Eliquis)  Mechanical VTE Prophylaxis in Place: Yes    Patient Centered Rounds: I have performed bedside rounds with nursing staff today      Discussions with Specialists or Other Care Team Provider:  Discussed with case management primary RN    Education and Discussions with Family / Patient:  Discussed plan of care with patient denies any additional questions or concerns at this time skills plan of care with patient's significant other as well as patient's daughter family is still discussing    Time Spent for Care: 30 minutes  More than 50% of total time spent on counseling and coordination of care as described above  Current Length of Stay: 1 day(s)    Current Patient Status: Inpatient   Certification Statement: The patient will continue to require additional inpatient hospital stay due to Pain management and safe discharge planning    Discharge Plan / Estimated Discharge Date:  Hopefully within the next 24 hours      Code Status: Level 1 - Full Code      Subjective:   Denies any chest pain chest tightness shortness of breath or difficulty breathing  Reports some rib pain with certain movements however overall come true old  Is still complaining of significant left hip pain and difficulty ambulating    Objective:     Vitals:   Temp (24hrs), Av 3 °F (36 8 °C), Min:97 4 °F (36 3 °C), Max:99 1 °F (37 3 °C)    Temp:  [97 4 °F (36 3 °C)-99 1 °F (37 3 °C)] 98 5 °F (36 9 °C)  HR:  [43-50] 44  Resp:  [14-16] 14  BP: (138-185)/(64-73) 170/68  SpO2:  [95 %-96 %] 96 %  Body mass index is 33 21 kg/m²  Input and Output Summary (last 24 hours): Intake/Output Summary (Last 24 hours) at 2020 1418  Last data filed at 2020 0649  Gross per 24 hour   Intake 240 ml   Output 1125 ml   Net -885 ml       Physical Exam:     Physical Exam   Constitutional: He is oriented to person, place, and time  He appears well-developed and well-nourished  HENT:   Head: Normocephalic  Eyes: Pupils are equal, round, and reactive to light  Neck: Normal range of motion  Cardiovascular: Normal rate  Pulmonary/Chest: Effort normal    Abdominal: Soft  Musculoskeletal: Normal range of motion  General: Tenderness and edema present  Comments: Weakness     Neurological: He is alert and oriented to person, place, and time  Skin: Skin is warm and dry  Psychiatric: He has a normal mood and affect   His behavior is normal  Judgment and thought content normal  Nursing note reviewed  Additional Data:     Labs:    Results from last 7 days   Lab Units 08/02/20  0448 08/01/20  0452   WBC Thousand/uL 6 59 6 37   HEMOGLOBIN g/dL 12 7 12 8   HEMATOCRIT % 39 1 39 3   PLATELETS Thousands/uL 154 163   NEUTROS PCT %  --  55   LYMPHS PCT %  --  32   MONOS PCT %  --  7   EOS PCT %  --  5     Results from last 7 days   Lab Units 08/02/20  0448  07/31/20  0907   POTASSIUM mmol/L 4 3   < > 4 7   CHLORIDE mmol/L 105   < > 104   CO2 mmol/L 26   < > 27   BUN mg/dL 36*   < > 37*   CREATININE mg/dL 1 71*   < > 1 86*   CALCIUM mg/dL 8 9   < > 8 9   ALK PHOS U/L  --   --  56   ALT U/L  --   --  18   AST U/L  --   --  16    < > = values in this interval not displayed  Results from last 7 days   Lab Units 07/31/20  0907   INR  1 17       * I Have Reviewed All Lab Data Listed Above  * Additional Pertinent Lab Tests Reviewed:  [place] [facility] Admission Reviewed    Recent Cultures (last 7 days):           Last 24 Hours Medication List:   acetaminophen, 975 mg, Oral, Q8H [facility], JALEEL Healy  amLODIPine, 10 mg, Oral, Daily, JALEEL Healy  apixaban, 2 5 mg, Oral, BID, JALEEL Robertson  clopidogrel, 75 mg, Oral, Daily, JALEEL Robertson  docusate sodium, 100 mg, Oral, BID, JALEEL Healy  doxazosin, 8 mg, Oral, Daily, JALEEL Healy  gabapentin, 100 mg, Oral, BID, JALEEL Healy  hydrALAZINE, 5 mg, Intravenous, Q6H PRN, JALEEL Robertson  insulin lispro, 1-6 Units, Subcutaneous, TID AC, JALEEL Healy  insulin lispro, 1-6 Units, Subcutaneous, HS, JALEEL Healy  isosorbide dinitrate, 10 mg, Oral, BID, JALEEL Healy  lidocaine, 1 patch, Topical, Daily, JALEEL Robertson  lisinopril, 40 mg, Oral, Daily, JALEEL Robertson  nitroglycerin, 0 4 mg, Sublingual, Q5 Min PRN, JALEEL Robertson  oxyCODONE, 5 mg, Oral, Q4H PRN, JALEEL Robertson  oxyCODONE, 7 5 mg, Oral, Q4H PRN, Alicia Williamson [place] Belinda, CRNP  senna, 2 tablet, Oral, HS, JALEEL Rivero         Today, Patient Was Seen By: JALEEL Rivero    ** Please Note: Dragon 360 Dictation voice to text software may have been used in the creation of this document   **

## 2020-08-02 NOTE — ASSESSMENT & PLAN NOTE
Patient was walking his dog and tripped over the dog and had a fall resulting in hip pain and side pain  Patient was found to have 5th and 6th rib fractures  Hip x-ray shows replacement with no fracture  CT spine unremarkable  CT of head negative   Patient is still having significant left hip pain with ambulation, and is unsteady on his feet  Incentive spirometry  Fall precautions  PT/OT short-term rehab has been recommended however patient is refusing, will continue to talk to family

## 2020-08-02 NOTE — SOCIAL WORK
Per NP she met w pt and daughter and pt is open to STR  Cm met w them and pt does not want to go, but will consider  Daughter is insistent that this is the best for him and helping to understand that  Cm provided them w list of facilities in [address] [place] [address]  They are agreeable to referrals being sent  A post acute care recommendation was made by your care team for STR  Discussed Freedom of Choice with patient  List of agencies given to patient via in person  patient aware the list is custom filtered for them by preference  and that [facility] post acute providers are designated

## 2020-08-03 LAB
ANION GAP SERPL CALCULATED.3IONS-SCNC: 10 MMOL/L (ref 4–13)
BUN SERPL-MCNC: 38 MG/DL (ref 5–25)
CALCIUM SERPL-MCNC: 8.7 MG/DL (ref 8.3–10.1)
CHLORIDE SERPL-SCNC: 107 MMOL/L (ref 100–108)
CO2 SERPL-SCNC: 25 MMOL/L (ref 21–32)
CREAT SERPL-MCNC: 1.68 MG/DL (ref 0.6–1.3)
ERYTHROCYTE [DISTWIDTH] IN BLOOD BY AUTOMATED COUNT: 12.8 % (ref 11.6–15.1)
GFR SERPL CREATININE-BSD FRML MDRD: 37 ML/MIN/1.73SQ M
GLUCOSE SERPL-MCNC: 139 MG/DL (ref 65–140)
GLUCOSE SERPL-MCNC: 154 MG/DL (ref 65–140)
GLUCOSE SERPL-MCNC: 194 MG/DL (ref 65–140)
GLUCOSE SERPL-MCNC: 212 MG/DL (ref 65–140)
GLUCOSE SERPL-MCNC: 241 MG/DL (ref 65–140)
GLUCOSE SERPL-MCNC: 267 MG/DL (ref 65–140)
HCT VFR BLD AUTO: 36.7 % (ref 36.5–49.3)
HGB BLD-MCNC: 11.8 G/DL (ref 12–17)
MCH RBC QN AUTO: 30.8 PG (ref 26.8–34.3)
MCHC RBC AUTO-ENTMCNC: 32.2 G/DL (ref 31.4–37.4)
MCV RBC AUTO: 96 FL (ref 82–98)
PLATELET # BLD AUTO: 165 THOUSANDS/UL (ref 149–390)
PMV BLD AUTO: 9.9 FL (ref 8.9–12.7)
POTASSIUM SERPL-SCNC: 4.3 MMOL/L (ref 3.5–5.3)
RBC # BLD AUTO: 3.83 MILLION/UL (ref 3.88–5.62)
SODIUM SERPL-SCNC: 142 MMOL/L (ref 136–145)
WBC # BLD AUTO: 6.14 THOUSAND/UL (ref 4.31–10.16)

## 2020-08-03 PROCEDURE — 99233 SBSQ HOSP IP/OBS HIGH 50: CPT | Performed by: NURSE PRACTITIONER

## 2020-08-03 PROCEDURE — 97167 OT EVAL HIGH COMPLEX 60 MIN: CPT

## 2020-08-03 PROCEDURE — 82948 REAGENT STRIP/BLOOD GLUCOSE: CPT

## 2020-08-03 PROCEDURE — 80048 BASIC METABOLIC PNL TOTAL CA: CPT | Performed by: NURSE PRACTITIONER

## 2020-08-03 PROCEDURE — 85027 COMPLETE CBC AUTOMATED: CPT | Performed by: NURSE PRACTITIONER

## 2020-08-03 RX ORDER — POLYETHYLENE GLYCOL 3350 17 G/17G
17 POWDER, FOR SOLUTION ORAL DAILY
Status: DISCONTINUED | OUTPATIENT
Start: 2020-08-03 | End: 2020-08-04 | Stop reason: HOSPADM

## 2020-08-03 RX ADMIN — INSULIN LISPRO 3 UNITS: 100 INJECTION, SOLUTION INTRAVENOUS; SUBCUTANEOUS at 12:30

## 2020-08-03 RX ADMIN — ACETAMINOPHEN 975 MG: 325 TABLET, FILM COATED ORAL at 06:18

## 2020-08-03 RX ADMIN — INSULIN LISPRO 3 UNITS: 100 INJECTION, SOLUTION INTRAVENOUS; SUBCUTANEOUS at 09:10

## 2020-08-03 RX ADMIN — POLYETHYLENE GLYCOL 3350 17 G: 17 POWDER, FOR SOLUTION ORAL at 14:21

## 2020-08-03 RX ADMIN — DOXAZOSIN 8 MG: 4 TABLET ORAL at 09:02

## 2020-08-03 RX ADMIN — ACETAMINOPHEN 975 MG: 325 TABLET, FILM COATED ORAL at 21:43

## 2020-08-03 RX ADMIN — ACETAMINOPHEN 975 MG: 325 TABLET, FILM COATED ORAL at 14:21

## 2020-08-03 RX ADMIN — ISOSORBIDE DINITRATE 10 MG: 10 TABLET ORAL at 17:39

## 2020-08-03 RX ADMIN — GABAPENTIN 100 MG: 100 CAPSULE ORAL at 17:39

## 2020-08-03 RX ADMIN — LIDOCAINE 1 PATCH: 50 PATCH CUTANEOUS at 09:00

## 2020-08-03 RX ADMIN — CLOPIDOGREL BISULFATE 75 MG: 75 TABLET ORAL at 09:04

## 2020-08-03 RX ADMIN — INSULIN LISPRO 2 UNITS: 100 INJECTION, SOLUTION INTRAVENOUS; SUBCUTANEOUS at 21:42

## 2020-08-03 RX ADMIN — APIXABAN 2.5 MG: 2.5 TABLET, FILM COATED ORAL at 17:39

## 2020-08-03 RX ADMIN — AMLODIPINE BESYLATE 10 MG: 10 TABLET ORAL at 09:04

## 2020-08-03 RX ADMIN — GABAPENTIN 100 MG: 100 CAPSULE ORAL at 09:03

## 2020-08-03 RX ADMIN — LISINOPRIL 40 MG: 20 TABLET ORAL at 09:04

## 2020-08-03 RX ADMIN — INSULIN LISPRO 2 UNITS: 100 INJECTION, SOLUTION INTRAVENOUS; SUBCUTANEOUS at 17:41

## 2020-08-03 RX ADMIN — DOCUSATE SODIUM 100 MG: 100 CAPSULE, LIQUID FILLED ORAL at 09:04

## 2020-08-03 RX ADMIN — ISOSORBIDE DINITRATE 10 MG: 10 TABLET ORAL at 09:03

## 2020-08-03 RX ADMIN — APIXABAN 2.5 MG: 2.5 TABLET, FILM COATED ORAL at 09:03

## 2020-08-03 NOTE — PLAN OF CARE
Problem: OCCUPATIONAL THERAPY ADULT  Goal: Performs self-care activities at highest level of function for planned discharge setting  See evaluation for individualized goals  Description: Treatment Interventions: ADL retraining, Functional transfer training, Endurance training, Cognitive reorientation, Patient/family training, Equipment evaluation/education, Compensatory technique education, Energy conservation, Activityengagement  Equipment Recommended: Other (comment)(none )       See flowsheet documentation for full assessment, interventions and recommendations  Note: Limitation: Decreased ADL status, Decreased Safe judgement during ADL, Decreased endurance, Decreased self-care trans, Decreased high-level ADLs  Prognosis: Good  Assessment: Pt is a 82 y o  M admitted to [facility] [address] [facility] on 7/31/2020 s/p fall  Comorbidities limiting pt performance include: rib fracture, hypertensive kidney disease with stage 3 chronic kidney disease, and diabetes mellitus, type 2  OT orders received  Pt chart reviewed  Performed at least two patient identifiers including name and wrist band during session  Prior to admission, pt was independent in all ADLs and functional mobility  Pt lives with significant other in one-level mobile home with 4 DILMA  Pt has a walk-in shower with grab bars and a standard toilet  At baseline, pt was not ambulatory with use of AD  Upon evaluation, pt was received, supine in bed, alert and oriented to person, place, time, and situation, with no s/s of distress  Pt required supervision assist for grooming and eating  Pt required min A for UB ADLs and max A for LB ADLs  Pt required mod A for toileting and functional assistance  While completing bed mobility and transferring, pt required min A of 1, with increased time and verbal cues due to increased pain   Deficits limiting pt occupational performance at time of evaluation include: increased pain in left hip, decreased dynamic sitting and standing balance, decreased engagement in daily activities, and decreased activity tolerance  Occupational performance areas limited due to the deficits mentioned above include: bathing, dressing, toileting, functional mobility, community mobility, leisure and social participation  Pt would benefit from continued skilled OT sessions while admitted to the hospital to address the deficits mentioned above and maximize functional independence in ADLs and functional mobility  From an OT standpoint, recommendation at d/c would be post acute rehab  OT Discharge Recommendation: Post-Acute Rehabilitation Services  OT - OK to Discharge:  Yes

## 2020-08-03 NOTE — PLAN OF CARE
Problem: Potential for Falls  Goal: Patient will remain free of falls  Description: INTERVENTIONS:  - Assess patient frequently for physical needs  -  Identify cognitive and physical deficits and behaviors that affect risk of falls    -  [place] fall precautions as indicated by assessment   - Educate patient/family on patient safety including physical limitations  - Instruct patient to call for assistance with activity based on assessment  - Modify environment to reduce risk of injury  - Consider OT/PT consult to assist with strengthening/mobility  Outcome: Progressing     Problem: PAIN - ADULT  Goal: Verbalizes/displays adequate comfort level or baseline comfort level  Description: Interventions:  - Encourage patient to monitor pain and request assistance  - Assess pain using appropriate pain scale  - Administer analgesics based on type and severity of pain and evaluate response  - Implement non-pharmacological measures as appropriate and evaluate response  - Consider cultural and social influences on pain and pain management  - Notify physician/advanced practitioner if interventions unsuccessful or patient reports new pain  Outcome: Progressing     Problem: INFECTION - ADULT  Goal: Absence or prevention of progression during hospitalization  Description: INTERVENTIONS:  - Assess and monitor for signs and symptoms of infection  - Monitor lab/diagnostic results  - Monitor all insertion sites, i e  indwelling lines, tubes, and drains  - Monitor endotracheal if appropriate and nasal secretions for changes in amount and color  - [place] appropriate cooling/warming therapies per order  - Administer medications as ordered  - Instruct and encourage patient and family to use good hand hygiene technique  - Identify and instruct in appropriate isolation precautions for identified infection/condition  Outcome: Progressing  Goal: Absence of fever/infection during neutropenic period  Description: INTERVENTIONS:  - Monitor WBC    Outcome: Progressing     Problem: SAFETY ADULT  Goal: Maintain or return to baseline ADL function  Description: INTERVENTIONS:  -  Assess patient's ability to carry out ADLs; assess patient's baseline for ADL function and identify physical deficits which impact ability to perform ADLs (bathing, care of mouth/teeth, toileting, grooming, dressing, etc )  - Assess/evaluate cause of self-care deficits   - Assess range of motion  - Assess patient's mobility; develop plan if impaired  - Assess patient's need for assistive devices and provide as appropriate  - Encourage maximum independence but intervene and supervise when necessary  - Involve family in performance of ADLs  - Assess for home care needs following discharge   - Consider OT consult to assist with ADL evaluation and planning for discharge  - Provide patient education as appropriate  Outcome: Progressing  Goal: Maintain or return mobility status to optimal level  Description: INTERVENTIONS:  - Assess patient's baseline mobility status (ambulation, transfers, stairs, etc )    - Identify cognitive and physical deficits and behaviors that affect mobility  - Identify mobility aids required to assist with transfers and/or ambulation (gait belt, sit-to-stand, lift, walker, cane, etc )  - [place] fall precautions as indicated by assessment  - Record patient progress and toleration of activity level on Mobility SBAR; progress patient to next Phase/Stage  - Instruct patient to call for assistance with activity based on assessment  - Consider rehabilitation consult to assist with strengthening/weightbearing, etc   Outcome: Progressing     Problem: DISCHARGE PLANNING  Goal: Discharge to home or other facility with appropriate resources  Description: INTERVENTIONS:  - Identify barriers to discharge w/patient and caregiver  - Arrange for needed discharge resources and transportation as appropriate  - Identify discharge learning needs (meds, wound care, etc )  - Arrange for interpretive services to assist at discharge as needed  - Refer to Case Management Department for coordinating discharge planning if the patient needs post-hospital services based on physician/advanced practitioner order or complex needs related to functional status, cognitive ability, or social support system  Outcome: Progressing     Problem: Knowledge Deficit  Goal: Patient/family/caregiver demonstrates understanding of disease process, treatment plan, medications, and discharge instructions  Description: Complete learning assessment and assess knowledge base    Interventions:  - Provide teaching at level of understanding  - Provide teaching via preferred learning methods  Outcome: Progressing

## 2020-08-03 NOTE — ASSESSMENT & PLAN NOTE
· Chest CT shows minimally displaced 5th and 6th rib fracture  · Rib fracture protocol in place  · Incentive spirometry  · Pain control

## 2020-08-03 NOTE — PROGRESS NOTES
Progress Note - Jonnie Oh 1935, 82 y o  male MRN: [id number]    Unit/Bed#: -01 Encounter: [id number]    Primary Care Provider: Tamera Ang DO   Date and time admitted to hospital: 7/31/2020  8:36 AM    * Fall  Assessment & Plan  · Patient was walking his dog and tripped over the dog and had a fall resulting in hip pain and side pain  · Patient was found to have 5th and 6th rib fractures  · Hip x-ray shows replacement with no fracture  · CT spine unremarkable  · CT of head negative   · Patient is still having significant left hip pain with ambulation, and is unsteady on his feet  · Incentive spirometry  · Fall precautions  · PT/OT short-term rehab has been recommended, however, patient is reluctant but has agreed to seeing his options for STR placement  · Case management on board for dispo planning    Rib fracture  Assessment & Plan  · Chest CT shows minimally displaced 5th and 6th rib fracture  · Rib fracture protocol in place  · Incentive spirometry  · Pain control    DM2 (diabetes mellitus, type 2) [facility])  Assessment & Plan  Lab Results   Component Value Date    HGBA1C 7 2 (A) 01/28/2020       Recent Labs     08/02/20  1040 08/02/20  1552 08/02/20  2103 08/03/20  0632   POCGLU 204* 224* 193* 154*       Blood Sugar Average: Last 72 hrs:  (P) 187     · Carb controlled diet  · Sliding scale  · Monitor blood glucose AC/HS    Hypertensive kidney disease with stage 3 chronic kidney disease (HCC)  Assessment & Plan  · Patient's creatinine is within baseline  · Continue home medications  · Avoid nephrotoxins    VTE Pharmacologic Prophylaxis:   Pharmacologic: Apixaban (Eliquis)  Mechanical VTE Prophylaxis in Place: Yes    Patient Centered Rounds: I have performed bedside rounds with nursing staff today  Discussions with Specialists or Other Care Team Provider: Case management    Education and Discussions with Family / Patient: Patient    Time Spent for Care: 45 minutes    More than 50% of total time spent on counseling and coordination of care as described above  Current Length of Stay: 2 day(s)    Current Patient Status: Inpatient   Certification Statement: The patient will continue to require additional inpatient hospital stay due to ongoing treatment in setting of rib fractures, pain control, and pending placement  Discharge Plan: Discharge to [facility] [address] hopefully within 24 hours    Code Status: Level 1 - Full Code      Subjective:   Patient is resting in bed at this time with no complaints of chest pain, shortness of breath, fever, or chills  He complains that he has not had a bowel movement yet  Patient is still agreeable to STR at this time  Objective:     Vitals:   Temp (24hrs), Av 6 °F (37 °C), Min:98 4 °F (36 9 °C), Max:98 7 °F (37 1 °C)    Temp:  [98 4 °F (36 9 °C)-98 7 °F (37 1 °C)] 98 6 °F (37 °C)  HR:  [50-55] 55  Resp:  [18] 18  BP: (133-175)/(60-84) 133/60  SpO2:  [96 %-97 %] 96 %  Body mass index is 33 21 kg/m²  Input and Output Summary (last 24 hours): Intake/Output Summary (Last 24 hours) at 8/3/2020 1023  Last data filed at 8/3/2020 0454  Gross per 24 hour   Intake 960 ml   Output 700 ml   Net 260 ml       Physical Exam:     Physical Exam   Constitutional: He is oriented to person, place, and time  He appears well-developed and well-nourished  HENT:   Head: Normocephalic and atraumatic  Eyes: Conjunctivae and EOM are normal    Neck: Normal range of motion  Cardiovascular: Normal rate, regular rhythm, normal heart sounds and intact distal pulses  Pulmonary/Chest: Effort normal and breath sounds normal    Abdominal: Soft  Bowel sounds are normal  He exhibits no distension  There is no abdominal tenderness  Musculoskeletal: Normal range of motion  General: Tenderness (left thigh, ribs) present  Neurological: He is alert and oriented to person, place, and time  Skin: Skin is warm and dry  Capillary refill takes less than 2 seconds  No rash noted   No erythema  Psychiatric: He has a normal mood and affect  His behavior is normal  Judgment and thought content normal    Vitals reviewed  Additional Data:     Labs:    Results from last 7 days   Lab Units 08/03/20  0454  08/01/20  0452   WBC Thousand/uL 6 14   < > 6 37   HEMOGLOBIN g/dL 11 8*   < > 12 8   HEMATOCRIT % 36 7   < > 39 3   PLATELETS Thousands/uL 165   < > 163   NEUTROS PCT %  --   --  55   LYMPHS PCT %  --   --  32   MONOS PCT %  --   --  7   EOS PCT %  --   --  5    < > = values in this interval not displayed  Results from last 7 days   Lab Units 08/03/20  0454  07/31/20  0907   SODIUM mmol/L 142   < > 137   POTASSIUM mmol/L 4 3   < > 4 7   CHLORIDE mmol/L 107   < > 104   CO2 mmol/L 25   < > 27   BUN mg/dL 38*   < > 37*   CREATININE mg/dL 1 68*   < > 1 86*   ANION GAP mmol/L 10   < > 6   CALCIUM mg/dL 8 7   < > 8 9   ALBUMIN g/dL  --   --  3 4*   TOTAL BILIRUBIN mg/dL  --   --  0 50   ALK PHOS U/L  --   --  56   ALT U/L  --   --  18   AST U/L  --   --  16   GLUCOSE RANDOM mg/dL 139   < > 154*    < > = values in this interval not displayed  Results from last 7 days   Lab Units 07/31/20  0907   INR  1 17     Results from last 7 days   Lab Units 08/03/20  0918 08/03/20  5472 08/02/20  2103 08/02/20  1552 08/02/20  1040 08/02/20  1316 08/01/20  2116 08/01/20  1613 08/01/20  1125 08/01/20  0634 07/31/20  2049 07/31/20  1734   POC GLUCOSE mg/dl 267* 154* 193* 224* 204* 142* 154* 183* 227* 145* 213* 218*                   * I Have Reviewed All Lab Data Listed Above  * Additional Pertinent Lab Tests Reviewed:  All Labs Within Last 24 Hours Reviewed      Recent Cultures (last 7 days):           Last 24 Hours Medication List:   acetaminophen, 975 mg, Oral, Q8H [facility], JALEEL Healy  amLODIPine, 10 mg, Oral, Daily, JALEEL Healy  apixaban, 2 5 mg, Oral, BID, JALEEL Chambers  clopidogrel, 75 mg, Oral, Daily, JALEEL Chambers  docusate sodium, 100 mg, Oral, BID, Randa GILBERT JALEEL Duke  doxazosin, 8 mg, Oral, Daily, JALEEL Healy  gabapentin, 100 mg, Oral, BID, JALEEL Healy  hydrALAZINE, 5 mg, Intravenous, Q6H PRN, JALEEL Lewis  insulin lispro, 1-6 Units, Subcutaneous, TID AC, JALEEL Healy  insulin lispro, 1-6 Units, Subcutaneous, HS, JALEEL Healy  isosorbide dinitrate, 10 mg, Oral, BID, JALEEL Healy  lidocaine, 1 patch, Topical, Daily, JALEEL Lewis  lisinopril, 40 mg, Oral, Daily, JALEEL Lewis  nitroglycerin, 0 4 mg, Sublingual, Q5 Min PRN, JALEEL Lewis  oxyCODONE, 5 mg, Oral, Q4H PRN, JALEEL Lewis  oxyCODONE, 7 5 mg, Oral, Q4H PRN, JALEEL Lewis  polyethylene glycol, 17 g, Oral, Daily, JALEEL Baum  senna, 2 tablet, Oral, HS, JALEEL Lewis         Today, Patient Was Seen By: JALEEL Baum    ** Please Note: Dictation voice to text software may have been used in the creation of this document   **

## 2020-08-03 NOTE — ASSESSMENT & PLAN NOTE
Lab Results   Component Value Date    HGBA1C 7 2 (A) 01/28/2020       Recent Labs     08/02/20  1040 08/02/20  1552 08/02/20  2103 08/03/20  0632   POCGLU 204* 224* 193* 154*       Blood Sugar Average: Last 72 hrs:  (P) 187     · Carb controlled diet  · Sliding scale  · Monitor blood glucose AC/HS

## 2020-08-03 NOTE — ASSESSMENT & PLAN NOTE
· Patient was walking his dog and tripped over the dog and had a fall resulting in hip pain and side pain  · Patient was found to have 5th and 6th rib fractures  · Hip x-ray shows replacement with no fracture  · CT spine unremarkable  · CT of head negative   · Patient is still having significant left hip pain with ambulation, and is unsteady on his feet  · Incentive spirometry  · Fall precautions  · PT/OT short-term rehab has been recommended, however, patient is reluctant but has agreed to seeing his options for STR placement    · Case management on board for dispo planning

## 2020-08-03 NOTE — OCCUPATIONAL THERAPY NOTE
Occupational Therapy Evaluation Note    Patient Name: Bindu Nicole  HVTEV'F Date: 8/3/2020      08/03/20 1222   Note Type   Note type Eval only   Restrictions/Precautions   Weight Bearing Precautions Per Order No   Braces or Orthoses Other (Comment)  (none reported)   Other Precautions Bed Alarm; Chair Alarm;Multiple lines;Telemetry; Fall Risk;Pain   Pain Assessment   Pain Assessment Tool 0-10   Pain Score Worst Possible Pain   Pain Location/Orientation Location: Hip;Orientation: Left   Pain Radiating Towards hip radiates down leg    Pain Onset/Description Descriptor: Throbbing; Onset: Ongoing   Patient's Stated Pain Goal No pain   Hospital Pain Intervention(s) Repositioned; Ambulation/increased activity   Multiple Pain Sites No   Home Living   Type of Home Mobile home   Home Layout One level;Stairs to enter with rails; Performs ADLs on one level; Able to live on main level with bedroom/bathroom  (4 DILMA )   Bathroom Shower/Tub Walk-in shower   Bathroom Toilet Standard   Bathroom Equipment Grab bars in [place] [address] Walker;Cane;Crutches   Additional Comments At baseline, pt ambulatory with use of AD  Prior Function   Level of [place] Independent with ADLs and functional mobility   Lives With Significant other   Receives Help From Family  (Significant other)   ADL Assistance Independent   IADLs Needs assistance   Falls in the last 6 months 1 to 4   Vocational Retired   Comments Patient currently drives    Lifestyle   Autonomy Pt lives with significant other in one-level mobile home with 4 DILMA  Pt has a walk-in shower with grab bars and a standard toilet  Prior to admission, pt was independent in all ADLs and functional mobility      Reciprocal Relationships Supportive Family    Service to Others Sewage treatment     Intrinsic Gratification Enjoyed working    Psychosocial   Psychosocial (WDL) WDL   ADL   Where Assessed Supine, bed   Equipment Provided Other (Comment)  (none )   Eating Assistance 5  Supervision/Setup   Eating Deficit Setup;Verbal cueing;Supervision/safety; Increased time to complete   Grooming Assistance 5  Supervision/Setup   Grooming Deficit Setup;Verbal cueing;Supervision/safety; Increased time to complete   UB Bathing Assistance 4  Minimal Assistance   UB Bathing Deficit Setup;Verbal cueing;Supervision/safety; Increased time to complete   LB Bathing Assistance 2  Maximal Assistance   LB Bathing Deficit Setup;Verbal cueing;Supervision/safety; Increased time to complete   UB Dressing Assistance 4  Minimal Assistance   UB Dressing Deficit Setup;Verbal cueing;Supervision/safety; Increased time to complete   LB Dressing Assistance 2  Maximal Assistance   LB Dressing Deficit Setup;Verbal cueing;Supervision/safety; Increased time to complete   Toileting Assistance  3  Moderate Assistance   Toileting Deficit Setup;Verbal cueing;Supervison/safety; Increased time to complete   Functional Assistance 3  Moderate Assistance   Functional Deficit Setup;Verbal cueing;Supervision/safety; Increased time to complete   Bed Mobility   Supine to Sit 4  Minimal assistance   Additional items Assist x 1; Increased time required;Verbal cues;LE management;HOB elevated; Bedrails   Sit to Supine 4  Minimal assistance   Additional items Assist x 1;HOB elevated; Bedrails; Increased time required;Verbal cues;LE management   Transfers   Sit to Stand 4  Minimal assistance   Additional items Assist x 1; Increased time required; Bedrails;Verbal cues   Stand to Sit 4  Minimal assistance   Additional items Assist x 1; Increased time required;Verbal cues;Armrests   Functional Mobility   Functional Mobility 4  Minimal assistance   Additional Comments Pt took lateral steps for functional ambulation with min assist of 1, with use of RW   Additional items Rolling walker   Balance   Static Sitting Good   Dynamic Sitting Fair +   Static Standing Fair   Dynamic Standing Fair -   Activity Tolerance Activity Tolerance Patient limited by pain; Patient limited by fatigue   Nurse Made Aware RN verbalized pt appropriate for therapy  RN made aware of therapy outcomes and pt current pain level  Therapist spoke with CM for recommendations at d/c     RUE Assessment   RUE Assessment WFL  (based on functional assessment)   LUE Assessment   LUE Assessment WFL  (based on functional assessment)   Hand Function   Gross Motor Coordination Impaired   Fine Motor Coordination Functional   Sensation   Light Touch No apparent deficits  (BUEs)   Vision-Basic Assessment   Current Vision Other (Comment)  (none at baseline )   Visual History Other (Comment)  (none )   Patient Visual Report Other (Comment)  (no chnages in vision )   Cognition   Overall Cognitive Status WFL   Arousal/Participation Alert;Arousable; Cooperative   Attention Within functional limits   Orientation Level Oriented X4   Memory Within functional limits   Following Commands Follows all commands and directions without difficulty   Comments Pt alert and oriented to person, place, time, and situation  Pt demonstrated ability to attend and follow all directives during evaluation  Assessment   Limitation Decreased ADL status; Decreased Safe judgement during ADL;Decreased endurance;Decreased self-care trans;Decreased high-level ADLs   Prognosis Good   Assessment Pt is a 82 y o  M admitted to [place] [id number] on 7/31/2020 s/p fall  Comorbidities limiting pt performance include: rib fracture, hypertensive kidney disease with stage 3 chronic kidney disease, and diabetes mellitus, type 2  OT orders received  Pt chart reviewed  Performed at least two patient identifiers including name and wrist band during session  Prior to admission, pt was independent in all ADLs and functional mobility  Pt lives with significant other in one-level mobile home with 4 DILMA  Pt has a walk-in shower with grab bars and a standard toilet   At baseline, pt was not ambulatory with use of AD  Upon evaluation, pt was received, supine in bed, alert and oriented to person, place, time, and situation, with no s/s of distress  Pt required supervision assist for grooming and eating  Pt required min A for UB ADLs and max A for LB ADLs  Pt required mod A for toileting and functional assistance  While completing bed mobility and transferring, pt required min A of 1, with increased time and verbal cues due to increased pain  Deficits limiting pt occupational performance at time of evaluation include: increased pain in left hip, decreased dynamic sitting and standing balance, decreased engagement in daily activities, and decreased activity tolerance  Occupational performance areas limited due to the deficits mentioned above include: bathing, dressing, toileting, functional mobility, community mobility, leisure and social participation  Pt would benefit from continued skilled OT sessions while admitted to the hospital to address the deficits mentioned above and maximize functional independence in ADLs and functional mobility  From an OT standpoint, recommendation at d/c would be post acute rehab  Goals   Patient Goals "to get better"    Plan   Treatment Interventions ADL retraining;Functional transfer training; Endurance training;Cognitive reorientation;Patient/family training;Equipment evaluation/education; Compensatory technique education; Energy conservation; Activityengagement   Goal Expiration Date 08/17/20   OT Frequency 3-5x/wk   Recommendation   OT Discharge Recommendation Post-Acute Rehabilitation Services   Equipment Recommended Other (comment)  (none )   OT - OK to Discharge Yes   B[place]hel Index   Feeding 10   Bathing 0   Grooming Score 0   Dressing Score 5   Bladder Score 10   Bowels Score 10   Toilet Use Score 5   Transfers (Bed/Chair) Score 10   Mobility (Level Surface) Score 0   Stairs Score 0   B[place]hel Index Score 50   Modified [place] Scale   Modified [place] Scale 4     Patient will demonstrate good joint protection techniques while engaging in a functional task with no verbal cues       Patient will complete UB ADLs while standing at sink at mod I level to increase engagement in daily activities and increase dynamic standing balance       Patient will complete LB ADLs seated EOB/chair at mod I level to increase dynamic sitting balance       Patient will transfer from bed/chair to chair/toilet/shower at mod I level to increase engagement in daily activities       Patient will tolerate sitting OOB for 3-5 hours per day to increase engagement in daily activities and increase participation in leisure activities

## 2020-08-04 VITALS
HEIGHT: 69 IN | DIASTOLIC BLOOD PRESSURE: 80 MMHG | WEIGHT: 224.87 LBS | RESPIRATION RATE: 18 BRPM | SYSTOLIC BLOOD PRESSURE: 150 MMHG | TEMPERATURE: 98.6 F | OXYGEN SATURATION: 98 % | HEART RATE: 53 BPM | BODY MASS INDEX: 33.31 KG/M2

## 2020-08-04 LAB
ANION GAP SERPL CALCULATED.3IONS-SCNC: 6 MMOL/L (ref 4–13)
BUN SERPL-MCNC: 36 MG/DL (ref 5–25)
CALCIUM SERPL-MCNC: 8.7 MG/DL (ref 8.3–10.1)
CHLORIDE SERPL-SCNC: 106 MMOL/L (ref 100–108)
CO2 SERPL-SCNC: 29 MMOL/L (ref 21–32)
CREAT SERPL-MCNC: 1.65 MG/DL (ref 0.6–1.3)
ERYTHROCYTE [DISTWIDTH] IN BLOOD BY AUTOMATED COUNT: 13 % (ref 11.6–15.1)
GFR SERPL CREATININE-BSD FRML MDRD: 38 ML/MIN/1.73SQ M
GLUCOSE SERPL-MCNC: 126 MG/DL (ref 65–140)
GLUCOSE SERPL-MCNC: 137 MG/DL (ref 65–140)
GLUCOSE SERPL-MCNC: 246 MG/DL (ref 65–140)
HCT VFR BLD AUTO: 36.8 % (ref 36.5–49.3)
HGB BLD-MCNC: 12 G/DL (ref 12–17)
MCH RBC QN AUTO: 31 PG (ref 26.8–34.3)
MCHC RBC AUTO-ENTMCNC: 32.6 G/DL (ref 31.4–37.4)
MCV RBC AUTO: 95 FL (ref 82–98)
PLATELET # BLD AUTO: 163 THOUSANDS/UL (ref 149–390)
PMV BLD AUTO: 9.7 FL (ref 8.9–12.7)
POTASSIUM SERPL-SCNC: 4.5 MMOL/L (ref 3.5–5.3)
RBC # BLD AUTO: 3.87 MILLION/UL (ref 3.88–5.62)
SARS-COV-2 RNA RESP QL NAA+PROBE: NEGATIVE
SODIUM SERPL-SCNC: 141 MMOL/L (ref 136–145)
WBC # BLD AUTO: 6.4 THOUSAND/UL (ref 4.31–10.16)

## 2020-08-04 PROCEDURE — 80048 BASIC METABOLIC PNL TOTAL CA: CPT | Performed by: NURSE PRACTITIONER

## 2020-08-04 PROCEDURE — 85027 COMPLETE CBC AUTOMATED: CPT | Performed by: NURSE PRACTITIONER

## 2020-08-04 PROCEDURE — 87635 SARS-COV-2 COVID-19 AMP PRB: CPT | Performed by: NURSE PRACTITIONER

## 2020-08-04 PROCEDURE — 99239 HOSP IP/OBS DSCHRG MGMT >30: CPT | Performed by: NURSE PRACTITIONER

## 2020-08-04 PROCEDURE — 82948 REAGENT STRIP/BLOOD GLUCOSE: CPT

## 2020-08-04 RX ORDER — OXYCODONE HYDROCHLORIDE 5 MG/1
5 TABLET ORAL EVERY 4 HOURS PRN
Qty: 20 TABLET | Refills: 0 | Status: SHIPPED | OUTPATIENT
Start: 2020-08-04 | End: 2020-08-14

## 2020-08-04 RX ORDER — LIDOCAINE 50 MG/G
1 PATCH TOPICAL DAILY
Refills: 0
Start: 2020-08-05 | End: 2020-10-06

## 2020-08-04 RX ADMIN — AMLODIPINE BESYLATE 10 MG: 10 TABLET ORAL at 09:42

## 2020-08-04 RX ADMIN — POLYETHYLENE GLYCOL 3350 17 G: 17 POWDER, FOR SOLUTION ORAL at 09:42

## 2020-08-04 RX ADMIN — DOCUSATE SODIUM 100 MG: 100 CAPSULE, LIQUID FILLED ORAL at 09:42

## 2020-08-04 RX ADMIN — DOXAZOSIN 8 MG: 4 TABLET ORAL at 09:43

## 2020-08-04 RX ADMIN — GABAPENTIN 100 MG: 100 CAPSULE ORAL at 09:42

## 2020-08-04 RX ADMIN — ISOSORBIDE DINITRATE 10 MG: 10 TABLET ORAL at 09:42

## 2020-08-04 RX ADMIN — APIXABAN 2.5 MG: 2.5 TABLET, FILM COATED ORAL at 09:42

## 2020-08-04 RX ADMIN — LIDOCAINE 1 PATCH: 50 PATCH CUTANEOUS at 09:43

## 2020-08-04 RX ADMIN — LISINOPRIL 40 MG: 20 TABLET ORAL at 09:42

## 2020-08-04 RX ADMIN — INSULIN LISPRO 3 UNITS: 100 INJECTION, SOLUTION INTRAVENOUS; SUBCUTANEOUS at 12:10

## 2020-08-04 RX ADMIN — CLOPIDOGREL BISULFATE 75 MG: 75 TABLET ORAL at 09:42

## 2020-08-04 RX ADMIN — ACETAMINOPHEN 975 MG: 325 TABLET, FILM COATED ORAL at 05:40

## 2020-08-04 NOTE — ASSESSMENT & PLAN NOTE
Lab Results   Component Value Date    HGBA1C 7 2 (A) 01/28/2020       Recent Labs     08/03/20  1107 08/03/20  1606 08/03/20 2058 08/04/20  0627   POCGLU 241* 212* 194* 126       Blood Sugar Average: Last 72 hrs:  (P) 190 [id number]     · Carb controlled diet  · Sliding scale  · Monitor blood glucose AC/HS  · Can resume home regimen upon discharge

## 2020-08-04 NOTE — ASSESSMENT & PLAN NOTE
· Patient was walking his dog and tripped over the dog and had a fall resulting in hip pain and side pain  · Patient was found to have 5th and 6th rib fractures  · Hip x-ray shows replacement with no fracture  · CT spine unremarkable  · CT of head negative   · Patient is still having significant left hip pain with ambulation, and is unsteady on his feet  · Incentive spirometry  · Fall precautions  · PT/OT short-term rehab has been recommended  · [facility] [address] [facility]

## 2020-08-04 NOTE — TRANSPORTATION MEDICAL NECESSITY
Section I - General Information    Name of Patient: Yessy Pineda                 : 1935    Medicare #: [id number]  Transport Date: 20 (PCS is valid for round trips on this date and for all repetitive trips in the 60-day range as noted below )  Origin: E[address] [place] 84: [address] [facility], [place]  Is the pt's stay covered under Medicare Part A (PPS/DRG)   [x]     Closest appropriate facility? If no, why is transport to more distant facility required? Yes  If hospice pt, is this transport related to pt's terminal illness? No       Section II - Medical Necessity Questionnaire  Ambulance transportation is medically necessary only if other means of transport are contraindicated or would be potentially harmful to the patient  To meet this requirement, the patient must either be "bed confined" or suffer from a condition such that transport by means other than ambulance is contraindicated by the patient's condition  The following questions must be answered by the medical professional signing below for this form to be valid:    1)  Describe the MEDICAL CONDITION (physical and/or mental) of this patient AT [address] [place] [address] that requires the patient to be transported in an ambulance and why transport by other means is contraindicated by the patient's condition:  PATIENT IS CONFUSED  PATIENT IS WEAK AND A HIGH FALL RISK  PATIENT HAS LEFT RIB FRACTURES AND LEFT HIP PAIN  2) Is the patient "bed confined" as defined below? No  To be "be confined" the patient must satisfy all three of the following conditions: (1) unable to get up from bed without Assistance; AND (2) unable to ambulate; AND (3) unable to sit in a chair or wheelchair  3) Can this patient safely be transported by car or wheelchair van (i e , seated during transport without a medical attendant or monitoring)? No    4) In addition to completing questions 1-3 above, please check any of the following conditions that apply*:   *Note: supporting documentation for any boxes checked must be maintained in the patient's medical records  If hosp-hosp transfer, describe services needed at 2nd facility not available at 1st facility? Patient is confused  Moderate/severe pain on movement   Medical attendant required   Unable to tolerate seated position for time needed to transport       Section III - Signature of Physician or Healthcare Professional  I certify that the above information is true and correct based on my evaluation of this patient, and represent that the patient requires transport by ambulance and that other forms of transport are contraindicated  I understand that this information will be used by the Centers for Medicare and Medicaid Services (CMS) to support the determination of medical necessity for ambulance services, and I represent that I have personal knowledge of the patient's condition at time of transport  []  If this box is checked, I also certify that the patient is physically or mentally incapable of signing the ambulance service's claim and that the institution with which I am affiliated has furnished care, services, or assistance to the patient  My signature below is made on behalf of the patient pursuant to 42 CFR §424 36(b)(4)  In accordance with 42 CFR §424 37, the specific reason(s) that the patient is physically or mentally incapable of signing the claim form is as follows: N/A        Signature of Physician* or Healthcare Professional______________________________________________________________  Signature Date 08/04/20 (For scheduled repetitive transports, this form is not valid for transports performed more than 60 days after this date)    Printed Name & Credentials of Physician or Healthcare Professional (MD, DO, RN, etc )_RAJENDRA ESQUIVEL_  *Form must be signed by patient's attending physician for scheduled, repetitive transports   For non-repetitive, unscheduled ambulance transports, if unable to obtain the signature of the attending physician, any of the following may sign (choose appropriate option below)  [] Physician Assistant []  Clinical Nurse Specialist []  Registered Nurse  []  Nurse Practitioner  [x] Discharge Planner

## 2020-08-04 NOTE — SOCIAL WORK
CM spoke to patient's dtr-Chanel who states patient fell which is why patient was brought into the hospital   Patient lives in his trailer with [address]  There are five stairs to enter from outside  Patient does not use dme  Claudette Pares states patient needs help however Minerva Rushing does not help  Patient has rehab hx with [facility] two and half years ago  Patient has [facility] hx  Patient fills scripts with [facility]  Claudette Pares denies mental health and substance abuse hx  Claudette Pares is named as health representative  Patient is retired and drives  Patient has [facility]  Upon clearance for discharge patient will be transported to [address] one of the facilities on the referrals who have accepted  [facility] is patient and family's preference  Treatment team informed  CM reviewed discharge planning process including the following: identifying help at home, patient preference for discharge planning needs, pharmacy preference, and availability of treatment team to discuss questions or concerns patient and/or family may have regarding understanding medications and recognizing signs and symptoms once discharged  CM also encouraged patient to follow up with all recommended appointments after discharge  Patient advised of importance for patient and family to participate in managing patients medical well being

## 2020-08-04 NOTE — PLAN OF CARE
Problem: Potential for Falls  Goal: Patient will remain free of falls  Description: INTERVENTIONS:  - Assess patient frequently for physical needs  -  Identify cognitive and physical deficits and behaviors that affect risk of falls    -  [place] fall precautions as indicated by assessment   - Educate patient/family on patient safety including physical limitations  - Instruct patient to call for assistance with activity based on assessment  - Modify environment to reduce risk of injury  - Consider OT/PT consult to assist with strengthening/mobility  Outcome: Progressing     Problem: PAIN - ADULT  Goal: Verbalizes/displays adequate comfort level or baseline comfort level  Description: Interventions:  - Encourage patient to monitor pain and request assistance  - Assess pain using appropriate pain scale  - Administer analgesics based on type and severity of pain and evaluate response  - Implement non-pharmacological measures as appropriate and evaluate response  - Consider cultural and social influences on pain and pain management  - Notify physician/advanced practitioner if interventions unsuccessful or patient reports new pain  Outcome: Progressing     Problem: INFECTION - ADULT  Goal: Absence or prevention of progression during hospitalization  Description: INTERVENTIONS:  - Assess and monitor for signs and symptoms of infection  - Monitor lab/diagnostic results  - Monitor all insertion sites, i e  indwelling lines, tubes, and drains  - Monitor endotracheal if appropriate and nasal secretions for changes in amount and color  - [place] appropriate cooling/warming therapies per order  - Administer medications as ordered  - Instruct and encourage patient and family to use good hand hygiene technique  - Identify and instruct in appropriate isolation precautions for identified infection/condition  Outcome: Progressing  Goal: Absence of fever/infection during neutropenic period  Description: INTERVENTIONS:  - Monitor WBC    Outcome: Progressing     Problem: SAFETY ADULT  Goal: Maintain or return to baseline ADL function  Description: INTERVENTIONS:  -  Assess patient's ability to carry out ADLs; assess patient's baseline for ADL function and identify physical deficits which impact ability to perform ADLs (bathing, care of mouth/teeth, toileting, grooming, dressing, etc )  - Assess/evaluate cause of self-care deficits   - Assess range of motion  - Assess patient's mobility; develop plan if impaired  - Assess patient's need for assistive devices and provide as appropriate  - Encourage maximum independence but intervene and supervise when necessary  - Involve family in performance of ADLs  - Assess for home care needs following discharge   - Consider OT consult to assist with ADL evaluation and planning for discharge  - Provide patient education as appropriate  Outcome: Progressing  Goal: Maintain or return mobility status to optimal level  Description: INTERVENTIONS:  - Assess patient's baseline mobility status (ambulation, transfers, stairs, etc )    - Identify cognitive and physical deficits and behaviors that affect mobility  - Identify mobility aids required to assist with transfers and/or ambulation (gait belt, sit-to-stand, lift, walker, cane, etc )  - [place] fall precautions as indicated by assessment  - Record patient progress and toleration of activity level on Mobility SBAR; progress patient to next Phase/Stage  - Instruct patient to call for assistance with activity based on assessment  - Consider rehabilitation consult to assist with strengthening/weightbearing, etc   Outcome: Progressing     Problem: DISCHARGE PLANNING  Goal: Discharge to home or other facility with appropriate resources  Description: INTERVENTIONS:  - Identify barriers to discharge w/patient and caregiver  - Arrange for needed discharge resources and transportation as appropriate  - Identify discharge learning needs (meds, wound care, etc )  - Arrange for interpretive services to assist at discharge as needed  - Refer to Case Management Department for coordinating discharge planning if the patient needs post-hospital services based on physician/advanced practitioner order or complex needs related to functional status, cognitive ability, or social support system  Outcome: Progressing     Problem: Knowledge Deficit  Goal: Patient/family/caregiver demonstrates understanding of disease process, treatment plan, medications, and discharge instructions  Description: Complete learning assessment and assess knowledge base    Interventions:  - Provide teaching at level of understanding  - Provide teaching via preferred learning methods  Outcome: Progressing

## 2020-08-04 NOTE — PLAN OF CARE
Problem: Potential for Falls  Goal: Patient will remain free of falls  Description: INTERVENTIONS:  - Assess patient frequently for physical needs  -  Identify cognitive and physical deficits and behaviors that affect risk of falls    -  [place] fall precautions as indicated by assessment   - Educate patient/family on patient safety including physical limitations  - Instruct patient to call for assistance with activity based on assessment  - Modify environment to reduce risk of injury  - Consider OT/PT consult to assist with strengthening/mobility  Outcome: Adequate for Discharge     Problem: PAIN - ADULT  Goal: Verbalizes/displays adequate comfort level or baseline comfort level  Description: Interventions:  - Encourage patient to monitor pain and request assistance  - Assess pain using appropriate pain scale  - Administer analgesics based on type and severity of pain and evaluate response  - Implement non-pharmacological measures as appropriate and evaluate response  - Consider cultural and social influences on pain and pain management  - Notify physician/advanced practitioner if interventions unsuccessful or patient reports new pain  Outcome: Adequate for Discharge     Problem: INFECTION - ADULT  Goal: Absence or prevention of progression during hospitalization  Description: INTERVENTIONS:  - Assess and monitor for signs and symptoms of infection  - Monitor lab/diagnostic results  - Monitor all insertion sites, i e  indwelling lines, tubes, and drains  - Monitor endotracheal if appropriate and nasal secretions for changes in amount and color  - [place] appropriate cooling/warming therapies per order  - Administer medications as ordered  - Instruct and encourage patient and family to use good hand hygiene technique  - Identify and instruct in appropriate isolation precautions for identified infection/condition  Outcome: Adequate for Discharge  Goal: Absence of fever/infection during neutropenic period  Description: INTERVENTIONS:  - Monitor WBC    Outcome: Adequate for Discharge     Problem: SAFETY ADULT  Goal: Maintain or return to baseline ADL function  Description: INTERVENTIONS:  -  Assess patient's ability to carry out ADLs; assess patient's baseline for ADL function and identify physical deficits which impact ability to perform ADLs (bathing, care of mouth/teeth, toileting, grooming, dressing, etc )  - Assess/evaluate cause of self-care deficits   - Assess range of motion  - Assess patient's mobility; develop plan if impaired  - Assess patient's need for assistive devices and provide as appropriate  - Encourage maximum independence but intervene and supervise when necessary  - Involve family in performance of ADLs  - Assess for home care needs following discharge   - Consider OT consult to assist with ADL evaluation and planning for discharge  - Provide patient education as appropriate  Outcome: Adequate for Discharge  Goal: Maintain or return mobility status to optimal level  Description: INTERVENTIONS:  - Assess patient's baseline mobility status (ambulation, transfers, stairs, etc )    - Identify cognitive and physical deficits and behaviors that affect mobility  - Identify mobility aids required to assist with transfers and/or ambulation (gait belt, sit-to-stand, lift, walker, cane, etc )  - [place] fall precautions as indicated by assessment  - Record patient progress and toleration of activity level on Mobility SBAR; progress patient to next Phase/Stage  - Instruct patient to call for assistance with activity based on assessment  - Consider rehabilitation consult to assist with strengthening/weightbearing, etc   Outcome: Adequate for Discharge     Problem: DISCHARGE PLANNING  Goal: Discharge to home or other facility with appropriate resources  Description: INTERVENTIONS:  - Identify barriers to discharge w/patient and caregiver  - Arrange for needed discharge resources and transportation as appropriate  - Identify discharge learning needs (meds, wound care, etc )  - Arrange for interpretive services to assist at discharge as needed  - Refer to Case Management Department for coordinating discharge planning if the patient needs post-hospital services based on physician/advanced practitioner order or complex needs related to functional status, cognitive ability, or social support system  Outcome: Adequate for Discharge     Problem: Knowledge Deficit  Goal: Patient/family/caregiver demonstrates understanding of disease process, treatment plan, medications, and discharge instructions  Description: Complete learning assessment and assess knowledge base    Interventions:  - Provide teaching at level of understanding  - Provide teaching via preferred learning methods  Outcome: Adequate for Discharge

## 2020-08-04 NOTE — DISCHARGE SUMMARY
Discharge- Jason You 1935, 82 y o  male MRN: [id number]    Unit/Bed#: -01 Encounter: [id number]    Primary Care Provider: Ivelisse Higginbotham DO   Date and time admitted to hospital: 7/31/2020  8:36 AM        * Fall  Assessment & Plan  · Patient was walking his dog and tripped over the dog and had a fall resulting in hip pain and side pain  · Patient was found to have 5th and 6th rib fractures  · Hip x-ray shows replacement with no fracture  · CT spine unremarkable  · CT of head negative   · Patient is still having significant left hip pain with ambulation, and is unsteady on his feet  · Incentive spirometry  · Fall precautions  · PT/OT short-term rehab has been recommended  · Patient accepted at [address] [place] [address] stable for discharge    Rib fracture  Assessment & Plan  · Chest CT shows minimally displaced 5th and 6th rib fracture  · Rib fracture protocol in place  · Incentive spirometry  · Pain control    Hypertensive kidney disease with stage 3 chronic kidney disease ([place] [address] )  Assessment & Plan  · Patient's creatinine is within baseline  · Continue home medications  · Avoid nephrotoxins    DM2 (diabetes mellitus, type 2) ([place] [address] )  Assessment & Plan  Lab Results   Component Value Date    HGBA1C 7 2 (A) 01/28/2020       Recent Labs     08/03/20  1107 08/03/20  1606 08/03/20  2058 08/04/20  0627   POCGLU 241* 212* 194* 126       Blood Sugar Average: Last 72 hrs:  (P) 190 [id number]     · Carb controlled diet  · Sliding scale  · Monitor blood glucose AC/HS  · Can resume home regimen upon discharge          Discharging Physician / Practitioner: JALEEL Moralez  PCP: Ivelisse Higginbotham DO  Admission Date:   Admission Orders (From admission, onward)     Ordered        08/01/20 1430  Inpatient Admission  Once         07/31/20 1128  Place in Observation  Once                   Discharge Date: 08/04/20    Resolved Problems  Date Reviewed: 8/4/2020    None          Consultations During Hospital Stay:  · PT OT    Procedures Performed:   · None    Significant Findings / Test Results:   · CT chest abdomen pelvis showing acute minimally displaced fractures of the left anterior 5th and 6th rib  No evidence of pneumothorax or pleural effusion or pulmonary contusion  No acute solid organ intra-abdominal injury  Urinary bladder showing wall thickening suspected prostatomegaly consider home and chronic bladder obstruction  · Mechanical fall    Incidental Findings:   · Aneurysmal dilation of ascending 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 dilated main pulmonary artery suggests pulmonary artery hypertension  Test Results Pending at Discharge (will require follow up): · None     Outpatient Tests Requested:  · None    Complications:  Need for placement    Reason for Admission:  Mechanical fall with rib fractures need for a short-term rehab    Hospital Course:     Jose Francisco Dent is a 82 y o  male patient who originally presented to the hospital on 7/31/2020 due to patient fell yesterday evening around 17:30  Patient reports he was walking his dog when he should hold him and he fell over to his left side  Patient required help getting off the ground  Patient had complaints of chest pain and left chest wall pain worsening with deep inspiration  Patient came to the ED where CT imaging revealed a acute left-sided 5th and 6th rib fracture  Patient was admitted due to fall concerning for monitoring history of the rib fractures PT OT evaluation recommended short-term rehab patient was agreeable remained in today for when placement could be obtained  Please see above list of diagnoses and related plan for additional information  Condition at Discharge: stable     Discharge Day Visit / Exam:     Subjective:  Patient reports his pain is steadily improving it is just his legs and his overall weakness that he wants to go to rehab and get stronger    Vitals: Blood Pressure: 170/80 (08/04/20 0942)  Pulse: (!) 53 (08/04/20 7831)  Temperature: 98 4 °F (36 9 °C) (08/04/20 0737)  Temp Source: Oral (08/03/20 3325)  Respirations: 18 (08/04/20 0737)  Height: 5' 9" (07/31/20 1716)  Weight - Scale: 102 kg (224 lb 13 9 oz) (07/31/20 0840)  SpO2: 98 % (08/04/20 0737)  Exam:   Physical Exam   Constitutional: He is oriented to person, place, and time  He appears well-developed and well-nourished  HENT:   Head: Normocephalic and atraumatic  Eyes: Conjunctivae and EOM are normal    Neck: Normal range of motion  Cardiovascular: Normal rate, regular rhythm and normal heart sounds  Pulmonary/Chest: [place] normal and breath sounds normal    Abdominal: Soft  Bowel sounds are normal    Musculoskeletal: Normal range of motion  Neurological: He is alert and oriented to person, place, and time  Skin: Skin is warm and dry  Psychiatric: He has a normal mood and affect  His behavior is normal        Discussion with Family:  Daughter Gosia Zamora updated via phone    Discharge instructions/Information to patient and family:   See after visit summary for information provided to patient and family  Provisions for Follow-Up Care:  See after visit summary for information related to follow-up care and any pertinent home health orders  Disposition:     [place] [address] to [facility]:   · Not Applicable to this Patient - Not Applicable to this Patient    Planned Readmission: none     Discharge Statement:  I spent 35 minutes discharging the patient  This time was spent on the day of discharge  I had direct contact with the patient on the day of discharge  Greater than 50% of the total time was spent examining patient, answering all patient questions, arranging and discussing plan of care with patient as well as directly providing post-discharge instructions  Additional time then spent on discharge activities      Discharge Medications:  See after visit summary for reconciled discharge medications provided to patient and family        ** Please Note: This note has been constructed using a voice recognition system **

## 2020-08-05 ENCOUNTER — TRANSITIONAL CARE MANAGEMENT (OUTPATIENT)
Dept: INTERNAL MEDICINE CLINIC | Facility: CLINIC | Age: 85
End: 2020-08-05

## 2020-08-22 ENCOUNTER — DOCUMENTATION (OUTPATIENT)
Dept: INTERNAL MEDICINE CLINIC | Facility: CLINIC | Age: 85
End: 2020-08-22

## 2020-08-22 ENCOUNTER — TELEPHONE (OUTPATIENT)
Dept: OTHER | Facility: OTHER | Age: 85
End: 2020-08-22

## 2020-08-22 ENCOUNTER — TELEPHONE (OUTPATIENT)
Dept: INTERNAL MEDICINE CLINIC | Facility: CLINIC | Age: 85
End: 2020-08-22

## 2020-08-22 DIAGNOSIS — S22.42XA CLOSED FRACTURE OF MULTIPLE RIBS OF LEFT SIDE, INITIAL ENCOUNTER: Primary | ICD-10-CM

## 2020-08-22 RX ORDER — OXYCODONE HYDROCHLORIDE 5 MG/1
5 TABLET ORAL EVERY 6 HOURS PRN
Qty: 20 TABLET | Refills: 0 | Status: SHIPPED | OUTPATIENT
Start: 2020-08-22 | End: 2020-11-12

## 2020-08-22 NOTE — TELEPHONE ENCOUNTER
I returned the call to [place] from [facility]  Patient discharged to home today from [facility] after falling at home and breaking some ribs and injuring previous hip surgery site  He was not sent home with any pain medications  Patient was given oxycodone 5 mg Q4 hours in the hospital and was discharged to [facility] on this medication  Oxycodone 5 mg [id number] sent to pharmacy  Alternate with tylenol, may ice area and use Lidocaine patch

## 2020-08-22 NOTE — TELEPHONE ENCOUNTER
[address]/ 1935/ Pharmacy called to state that they cannot take the pain medications over the phone "it is a C2, needs to be a C3" please call them  back to discuss  Contacted via TC

## 2020-08-24 DIAGNOSIS — I10 ESSENTIAL HYPERTENSION: ICD-10-CM

## 2020-08-24 NOTE — TELEPHONE ENCOUNTER
I do not believe I have seen this patient since he was hospitalized  I need to see him  He had rib fractures    Needs appointment tomorrow either with me or Pa

## 2020-08-24 NOTE — TELEPHONE ENCOUNTER
See other note regarding this patient  He did not come in for follow-up after hospitalization    Needs appointment with either PA or me tomorrow

## 2020-08-25 RX ORDER — AMLODIPINE BESYLATE 10 MG/1
TABLET ORAL
Qty: 90 TABLET | Refills: 3 | Status: SHIPPED | OUTPATIENT
Start: 2020-08-25 | End: 2021-09-09 | Stop reason: SDUPTHER

## 2020-08-31 DIAGNOSIS — G89.4 CHRONIC PAIN SYNDROME: ICD-10-CM

## 2020-08-31 DIAGNOSIS — I25.10 CORONARY ARTERY DISEASE INVOLVING NATIVE CORONARY ARTERY OF NATIVE HEART WITHOUT ANGINA PECTORIS: ICD-10-CM

## 2020-09-01 ENCOUNTER — TELEPHONE (OUTPATIENT)
Dept: INTERNAL MEDICINE CLINIC | Facility: CLINIC | Age: 85
End: 2020-09-01

## 2020-09-01 ENCOUNTER — TELEPHONE (OUTPATIENT)
Dept: CARDIOLOGY CLINIC | Facility: CLINIC | Age: 85
End: 2020-09-01

## 2020-09-01 RX ORDER — GABAPENTIN 100 MG/1
100 CAPSULE ORAL 2 TIMES DAILY
Qty: 60 CAPSULE | Refills: 0 | Status: SHIPPED | OUTPATIENT
Start: 2020-09-01 | End: 2020-09-03 | Stop reason: SDUPTHER

## 2020-09-01 RX ORDER — ISOSORBIDE DINITRATE 10 MG/1
10 TABLET ORAL 2 TIMES DAILY
Qty: 180 TABLET | Refills: 3 | Status: SHIPPED | OUTPATIENT
Start: 2020-09-01 | End: 2021-05-04 | Stop reason: SDUPTHER

## 2020-09-03 ENCOUNTER — OFFICE VISIT (OUTPATIENT)
Dept: INTERNAL MEDICINE CLINIC | Facility: CLINIC | Age: 85
End: 2020-09-03
Payer: MEDICARE

## 2020-09-03 VITALS
TEMPERATURE: 97.7 F | HEIGHT: 69 IN | DIASTOLIC BLOOD PRESSURE: 70 MMHG | SYSTOLIC BLOOD PRESSURE: 172 MMHG | OXYGEN SATURATION: 96 % | WEIGHT: 216.6 LBS | BODY MASS INDEX: 32.08 KG/M2 | HEART RATE: 49 BPM

## 2020-09-03 DIAGNOSIS — S22.42XD CLOSED FRACTURE OF MULTIPLE RIBS OF LEFT SIDE WITH ROUTINE HEALING: ICD-10-CM

## 2020-09-03 DIAGNOSIS — I48.0 PAROXYSMAL ATRIAL FIBRILLATION (HCC): Primary | ICD-10-CM

## 2020-09-03 DIAGNOSIS — Z86.73 HISTORY OF CVA (CEREBROVASCULAR ACCIDENT): ICD-10-CM

## 2020-09-03 DIAGNOSIS — G89.4 CHRONIC PAIN SYNDROME: ICD-10-CM

## 2020-09-03 DIAGNOSIS — I63.9 CEREBROVASCULAR ACCIDENT (CVA), UNSPECIFIED MECHANISM (HCC): ICD-10-CM

## 2020-09-03 DIAGNOSIS — E11.9 TYPE 2 DIABETES MELLITUS WITHOUT COMPLICATION, WITHOUT LONG-TERM CURRENT USE OF INSULIN (HCC): ICD-10-CM

## 2020-09-03 DIAGNOSIS — E11.22 TYPE 2 DIABETES MELLITUS WITH STAGE 3 CHRONIC KIDNEY DISEASE, WITHOUT LONG-TERM CURRENT USE OF INSULIN (HCC): ICD-10-CM

## 2020-09-03 DIAGNOSIS — N18.30 STAGE 3 CHRONIC KIDNEY DISEASE (HCC): ICD-10-CM

## 2020-09-03 DIAGNOSIS — N18.30 TYPE 2 DIABETES MELLITUS WITH STAGE 3 CHRONIC KIDNEY DISEASE, WITHOUT LONG-TERM CURRENT USE OF INSULIN (HCC): ICD-10-CM

## 2020-09-03 PROCEDURE — 99214 OFFICE O/P EST MOD 30 MIN: CPT | Performed by: INTERNAL MEDICINE

## 2020-09-03 RX ORDER — GABAPENTIN 100 MG/1
100 CAPSULE ORAL 2 TIMES DAILY
Qty: 180 CAPSULE | Refills: 3 | Status: SHIPPED | OUTPATIENT
Start: 2020-09-03 | End: 2021-08-20 | Stop reason: SDUPTHER

## 2020-09-03 RX ORDER — CLOPIDOGREL BISULFATE 75 MG/1
75 TABLET ORAL DAILY
Qty: 90 TABLET | Refills: 3 | Status: SHIPPED | OUTPATIENT
Start: 2020-09-03 | End: 2021-09-09 | Stop reason: SDUPTHER

## 2020-09-03 NOTE — PROGRESS NOTES
Assessment/Plan:  Regarding his fall he seems stable the present time  He denies any rib pain at the present time  Regarding cardiac status he is having no exertional chest pain  He is anticoagulated and followed by cardiology  He is going to see them in about 1 month  His blood sugars are said to be under good control  He is checking them and they are in the 130 range though he did not bring a list     He is accompanied by his daughter  He will establish with a new physician in 2 months  He will see cardiology in 1 month  Will check a CBC and BMP today  He is once again cautioned to did take great care to not fall again  1  Paroxysmal atrial fibrillation ([address] )     2  Chronic pain syndrome  gabapentin (NEURONTIN) 100 mg capsule   3  Cerebrovascular accident (CVA), unspecified mechanism ([address] )  clopidogrel (PLAVIX) 75 mg tablet   4  Type 2 diabetes mellitus without complication, without long-term current use of insulin ([address] )     5  Type 2 diabetes mellitus with stage 3 chronic kidney disease, without long-term current use of insulin ([facility])  CBC and differential    Basic metabolic panel   6  Stage 3 chronic kidney disease ([address] )     7  History of CVA (cerebrovascular accident)     8  Closed fracture of multiple ribs of left side with routine healing         Orders Placed This Encounter   Procedures    CBC and differential    Basic metabolic panel            Subjective:  He comes in for follow-up  He had fractured ribs  He fell at home  When he was in a month ago the last thing at told him was to be careful and not fall  He was walking his dog the dog took off and he tripped fell and broke 2 ribs on the left side  He had left hip contusion as well  His other problems are multiple include hypertension, diabetes type 2, coronary artery disease, paroxysmal atrial fibrillation  He has had DVT and is anticoagulated with both Eliquis and Plavix  He has had no bleeding    He went home 2 weeks ago after convalescing at a nursing home  Patient ID: Kelsea Preciado is a 82 y o  male  HPI    The following portions of the patient's history were reviewed and updated as appropriate:   He has a past medical history of Acute ST elevation myocardial infarction [facility]), Aortic valve disorder, Benign prostatic hyperplasia with lower urinary tract symptoms, CAD (coronary artery disease), Chronic kidney disease, DM2 (diabetes mellitus, type 2) ([address] ), History of colonoscopy, History of transfusion, History of transient cerebral ischemia, HLD (hyperlipidemia), HTN (hypertension), and Neuralgia ,  does not have any pertinent problems on file  ,   has a past surgical history that includes Total hip arthroplasty (Right); Hip hardware removal; Cardiac catheterization; Coronary angioplasty with stent (2008); Hand surgery; Total hip arthroplasty (Bilateral); and Cardiac catheterization (11/26/2019)  ,  family history includes Emphysema in his father and sister  ,   reports that he has never smoked  He has never used smokeless tobacco  He reports that he does not drink alcohol or use drugs  ,  is allergic to celecoxib; hydromorphone; pravastatin; and statins       Current Outpatient Medications:     amLODIPine (NORVASC) 10 mg tablet, TAKE 1 TABLET BY MOUTH  DAILY, Disp: 90 tablet, Rfl: 3    apixaban (ELIQUIS) 2 5 mg, Take 1 tablet (2 5 mg total) by mouth 2 (two) times a day, Disp: 180 tablet, Rfl: 3    Blood Glucose Monitoring Suppl (ONE TOUCH ULTRA MINI) w/Device KIT, by Does not apply route, Disp: , Rfl:     cholecalciferol (VITAMIN D3) 1,000 units tablet, Take 1,000 Units by mouth daily, Disp: , Rfl:     clopidogrel (PLAVIX) 75 mg tablet, Take 1 tablet (75 mg total) by mouth daily, Disp: 90 tablet, Rfl: 3    doxazosin (CARDURA) 8 MG tablet, TAKE 1 TABLET BY MOUTH  DAILY, Disp: 90 tablet, Rfl: 2    gabapentin (NEURONTIN) 100 mg capsule, Take 1 capsule (100 mg total) by mouth 2 (two) times a day, Disp: 180 capsule, Rfl: 3    glucose blood (ONE TOUCH ULTRA TEST) test strip, Test once daily, Disp: 100 each, Rfl: 5    isosorbide dinitrate (ISORDIL) 10 mg tablet, Take 1 tablet (10 mg total) by mouth 2 (two) times a day, Disp: 180 tablet, Rfl: 3    lidocaine (Lidoderm) 5 %, Apply 1 patch topically daily Remove & Discard patch within 12 hours or as directed by MD, Disp:  , Rfl: 0    lisinopril (ZESTRIL) 40 mg tablet, Take 1 tablet (40 mg total) by mouth daily, Disp: 90 tablet, Rfl: 3    metoprolol tartrate (LOPRESSOR) 25 mg tablet, Take 25 mg by mouth every 12 (twelve) hours, Disp: , Rfl:     nitroglycerin (NITROSTAT) 0 4 mg SL tablet, Place 1 tablet (0 4 mg total) under the tongue every 5 (five) minutes as needed for chest pain, Disp: 25 tablet, Rfl: 4    omega-3-acid ethyl esters (LOVAZA) 1 g capsule, Take 1,200 mg by mouth daily, Disp: , Rfl:     oxyCODONE (ROXICODONE) 5 mg immediate release tablet, Take 1 tablet (5 mg total) by mouth every 6 (six) hours as needed for moderate painMax Daily Amount: 20 mg, Disp: 20 tablet, Rfl: 0    pyridoxine (RA VITAMIN B-6) 100 MG tablet, Take 1 tablet by mouth daily, Disp: , Rfl:     vitamin B-12 (CYANOCOBALAMIN) 100 MCG tablet, Take 1,000 mcg by mouth daily, Disp: , Rfl:     Review of Systems   Constitutional: Positive for fatigue  Negative for activity change, appetite change, chills, diaphoresis, fever and unexpected weight change  HENT: Negative for congestion, ear pain, hearing loss, mouth sores, nosebleeds, postnasal drip, sinus pressure, sinus pain, sore throat and trouble swallowing  Eyes: Negative for pain, discharge and visual disturbance  Respiratory: Negative for apnea, cough, chest tightness, shortness of breath and wheezing  Cardiovascular: Negative for chest pain, palpitations and leg swelling  Gastrointestinal: Negative for abdominal pain, anal bleeding, blood in stool, constipation, diarrhea, nausea and vomiting     Endocrine: Negative for polydipsia and polyphagia  Genitourinary: Negative for decreased urine volume, dysuria, flank pain, frequency, hematuria and urgency  Musculoskeletal: Negative for arthralgias, back pain, gait problem, joint swelling and myalgias  Skin: Negative for rash and wound  Allergic/Immunologic: Negative for environmental allergies and food allergies  Neurological: Negative for dizziness, tremors, seizures, syncope, speech difficulty, light-headedness, numbness and headaches  Hematological: Negative for adenopathy  Does not bruise/bleed easily  Psychiatric/Behavioral: Negative for agitation, confusion, hallucinations, sleep disturbance and suicidal ideas  The patient is not nervous/anxious  Objective:  BP (!) 172/70 (BP Location: Left arm, Patient Position: Sitting, Cuff Size: Standard)   Pulse (!) 49   Temp 97 7 °F (36 5 °C)   Ht 5' 9" (1 753 m)   Wt 98 2 kg (216 lb 9 6 oz)   SpO2 96%   BMI 31 99 kg/m²      Physical Exam  Vitals signs and nursing note reviewed  Constitutional:       General: He is not in acute distress  Appearance: He is well-developed  Comments: Awake alert  Blood pressure is 136/74  Rhythm is regular  Rate is 60  Temperature is 97 7°  HENT:      Head: Normocephalic  Right Ear: External ear normal       Left Ear: External ear normal       Nose: Nose normal       Mouth/Throat:      Pharynx: No oropharyngeal exudate  Eyes:      General:         Right eye: No discharge  Left eye: No discharge  Conjunctiva/sclera: Conjunctivae normal       Pupils: Pupils are equal, round, and reactive to light  Neck:      Musculoskeletal: Normal range of motion and neck supple  Thyroid: No thyromegaly  Cardiovascular:      Rate and Rhythm: Normal rate and regular rhythm  Heart sounds: Normal heart sounds  No murmur  No friction rub  No gallop  Comments: Rhythm is regular  No ectopy    Pulmonary:      Effort: Pulmonary effort is normal  No respiratory distress  Breath sounds: Normal breath sounds  No wheezing or rales  Comments: Lungs are completely clear  Abdominal:      General: Bowel sounds are normal  There is no distension  Palpations: Abdomen is soft  There is no mass  Tenderness: There is no abdominal tenderness  There is no guarding or rebound  Musculoskeletal: Normal range of motion  General: No tenderness or deformity  Comments: Arthritic changes of his hands noted  He has some soreness of his left hip  Lymphadenopathy:      Cervical: No cervical adenopathy  Skin:     General: Skin is warm and dry  Findings: No erythema or rash  Neurological:      Mental Status: He is alert  Cranial Nerves: No cranial nerve deficit  Coordination: Coordination normal       Deep Tendon Reflexes: Reflexes are normal and symmetric  Reflexes normal    Psychiatric:         Behavior: Behavior normal          Thought Content:  Thought content normal          Judgment: Judgment normal            Recent Results (from the past 1008 hour(s))   CBC and differential    Collection Time: 07/31/20  9:07 AM   Result Value Ref Range    WBC 8 04 4 31 - 10 16 Thousand/uL    RBC 3 86 (L) 3 88 - 5 62 Million/uL    Hemoglobin 12 1 12 0 - 17 0 g/dL    Hematocrit 37 1 36 5 - 49 3 %    MCV 96 82 - 98 fL    MCH 31 3 26 8 - 34 3 pg    MCHC 32 6 31 4 - 37 4 g/dL    RDW 12 7 11 6 - 15 1 %    MPV 9 8 8 9 - 12 7 fL    Platelets 778 937 - 579 Thousands/uL    nRBC 0 /100 WBCs    Neutrophils Relative 74 43 - 75 %    Immat GRANS % 1 0 - 2 %    Lymphocytes Relative 15 14 - 44 %    Monocytes Relative 6 4 - 12 %    Eosinophils Relative 3 0 - 6 %    Basophils Relative 1 0 - 1 %    Neutrophils Absolute 6 00 1 85 - 7 62 Thousands/µL    Immature Grans Absolute 0 04 0 00 - 0 20 Thousand/uL    Lymphocytes Absolute 1 22 0 60 - 4 47 Thousands/µL    Monocytes Absolute 0 49 0 17 - 1 22 Thousand/µL    Eosinophils Absolute 0 24 0 00 - 0 61 Thousand/µL    Basophils Absolute 0 05 0 00 - 0 10 Thousands/µL   Comprehensive metabolic panel    Collection Time: 07/31/20  9:07 AM   Result Value Ref Range    Sodium 137 136 - 145 mmol/L    Potassium 4 7 3 5 - 5 3 mmol/L    Chloride 104 100 - 108 mmol/L    CO2 27 21 - 32 mmol/L    ANION GAP 6 4 - 13 mmol/L    BUN 37 (H) 5 - 25 mg/dL    Creatinine 1 86 (H) 0 60 - 1 30 mg/dL    Glucose 154 (H) 65 - 140 mg/dL    Calcium 8 9 8 3 - 10 1 mg/dL    AST 16 5 - 45 U/L    ALT 18 12 - 78 U/L    Alkaline Phosphatase 56 46 - 116 U/L    Total Protein 7 3 6 4 - 8 2 g/dL    Albumin 3 4 (L) 3 5 - 5 0 g/dL    Total Bilirubin 0 50 0 20 - 1 00 mg/dL    eGFR 33 ml/min/1 73sq m   APTT    Collection Time: 07/31/20  9:07 AM   Result Value Ref Range    PTT 33 23 - 37 seconds   Protime-INR    Collection Time: 07/31/20  9:07 AM   Result Value Ref Range    Protime 14 4 11 6 - 14 5 seconds    INR 1 17 0 84 - 1 19   ECG 12 lead    Collection Time: 07/31/20  9:44 AM   Result Value Ref Range    Ventricular Rate 45 BPM    Atrial Rate 45 BPM    OR Interval 252 ms    QRSD Interval 150 ms    QT Interval 502 ms    QTC Interval 434 ms    P [place] 59 degrees    QRS Axis 73 degrees    T Wave Axis 101 degrees   Fingerstick Glucose (POCT)    Collection Time: 07/31/20  5:34 PM   Result Value Ref Range    POC Glucose 218 (H) 65 - 140 mg/dl   Fingerstick Glucose (POCT)    Collection Time: 07/31/20  8:49 PM   Result Value Ref Range    POC Glucose 213 (H) 65 - 140 mg/dl   CBC and differential    Collection Time: 08/01/20  4:52 AM   Result Value Ref Range    WBC 6 37 4 31 - 10 16 Thousand/uL    RBC 4 09 3 88 - 5 62 Million/uL    Hemoglobin 12 8 12 0 - 17 0 g/dL    Hematocrit 39 3 36 5 - 49 3 %    MCV 96 82 - 98 fL    MCH 31 3 26 8 - 34 3 pg    MCHC 32 6 31 4 - 37 4 g/dL    RDW 12 8 11 6 - 15 1 %    MPV 9 7 8 9 - 12 7 fL    Platelets 110 326 - 943 Thousands/uL    nRBC 0 /100 WBCs    Neutrophils Relative 55 43 - 75 %    Immat GRANS % 0 0 - 2 %    Lymphocytes Relative 32 14 - 44 % Monocytes Relative 7 4 - 12 %    Eosinophils Relative 5 0 - 6 %    Basophils Relative 1 0 - 1 %    Neutrophils Absolute 3 49 1 85 - 7 62 Thousands/µL    Immature Grans Absolute 0 02 0 00 - 0 20 Thousand/uL    Lymphocytes Absolute 2 05 0 60 - 4 47 Thousands/µL    Monocytes Absolute 0 43 0 17 - 1 22 Thousand/µL    Eosinophils Absolute 0 34 0 00 - 0 61 Thousand/µL    Basophils Absolute 0 04 0 00 - 0 10 Thousands/µL   Basic Metabolic Panel    Collection Time: 08/01/20  4:52 AM   Result Value Ref Range    Sodium 136 136 - 145 mmol/L    Potassium 4 6 3 5 - 5 3 mmol/L    Chloride 104 100 - 108 mmol/L    CO2 24 21 - 32 mmol/L    ANION GAP 8 4 - 13 mmol/L    BUN 37 (H) 5 - 25 mg/dL    Creatinine 2 09 (H) 0 60 - 1 30 mg/dL    Glucose 134 65 - 140 mg/dL    Glucose, Fasting 134 (H) 65 - 99 mg/dL    Calcium 8 9 8 3 - 10 1 mg/dL    eGFR 29 ml/min/1 73sq m   Fingerstick Glucose (POCT)    Collection Time: 08/01/20  6:34 AM   Result Value Ref Range    POC Glucose 145 (H) 65 - 140 mg/dl   Fingerstick Glucose (POCT)    Collection Time: 08/01/20 11:25 AM   Result Value Ref Range    POC Glucose 227 (H) 65 - 140 mg/dl   Fingerstick Glucose (POCT)    Collection Time: 08/01/20  4:13 PM   Result Value Ref Range    POC Glucose 183 (H) 65 - 140 mg/dl   Fingerstick Glucose (POCT)    Collection Time: 08/01/20  9:16 PM   Result Value Ref Range    POC Glucose 154 (H) 65 - 140 mg/dl   Basic metabolic panel    Collection Time: 08/02/20  4:48 AM   Result Value Ref Range    Sodium 136 136 - 145 mmol/L    Potassium 4 3 3 5 - 5 3 mmol/L    Chloride 105 100 - 108 mmol/L    CO2 26 21 - 32 mmol/L    ANION GAP 5 4 - 13 mmol/L    BUN 36 (H) 5 - 25 mg/dL    Creatinine 1 71 (H) 0 60 - 1 30 mg/dL    Glucose 170 (H) 65 - 140 mg/dL    Calcium 8 9 8 3 - 10 1 mg/dL    eGFR 36 ml/min/1 73sq m   CBC    Collection Time: 08/02/20  4:48 AM   Result Value Ref Range    WBC 6 59 4 31 - 10 16 Thousand/uL    RBC 4 11 3 88 - 5 62 Million/uL    Hemoglobin 12 7 12 0 - 17 0 g/dL    Hematocrit 39 1 36 5 - 49 3 %    MCV 95 82 - 98 fL    MCH 30 9 26 8 - 34 3 pg    MCHC 32 5 31 4 - 37 4 g/dL    RDW 12 8 11 6 - 15 1 %    Platelets 348 051 - 493 Thousands/uL    MPV 10 0 8 9 - 12 7 fL   Fingerstick Glucose (POCT)    Collection Time: 08/02/20  6:32 AM   Result Value Ref Range    POC Glucose 142 (H) 65 - 140 mg/dl   Fingerstick Glucose (POCT)    Collection Time: 08/02/20 10:40 AM   Result Value Ref Range    POC Glucose 204 (H) 65 - 140 mg/dl   Fingerstick Glucose (POCT)    Collection Time: 08/02/20  3:52 PM   Result Value Ref Range    POC Glucose 224 (H) 65 - 140 mg/dl   Fingerstick Glucose (POCT)    Collection Time: 08/02/20  9:03 PM   Result Value Ref Range    POC Glucose 193 (H) 65 - 140 mg/dl   Basic metabolic panel    Collection Time: 08/03/20  4:54 AM   Result Value Ref Range    Sodium 142 136 - 145 mmol/L    Potassium 4 3 3 5 - 5 3 mmol/L    Chloride 107 100 - 108 mmol/L    CO2 25 21 - 32 mmol/L    ANION GAP 10 4 - 13 mmol/L    BUN 38 (H) 5 - 25 mg/dL    Creatinine 1 68 (H) 0 60 - 1 30 mg/dL    Glucose 139 65 - 140 mg/dL    Calcium 8 7 8 3 - 10 1 mg/dL    eGFR 37 ml/min/1 73sq m   CBC    Collection Time: 08/03/20  4:54 AM   Result Value Ref Range    WBC 6 14 4 31 - 10 16 Thousand/uL    RBC 3 83 (L) 3 88 - 5 62 Million/uL    Hemoglobin 11 8 (L) 12 0 - 17 0 g/dL    Hematocrit 36 7 36 5 - 49 3 %    MCV 96 82 - 98 fL    MCH 30 8 26 8 - 34 3 pg    MCHC 32 2 31 4 - 37 4 g/dL    RDW 12 8 11 6 - 15 1 %    Platelets 315 476 - 039 Thousands/uL    MPV 9 9 8 9 - 12 7 fL   Fingerstick Glucose (POCT)    Collection Time: 08/03/20  6:32 AM   Result Value Ref Range    POC Glucose 154 (H) 65 - 140 mg/dl   Fingerstick Glucose (POCT)    Collection Time: 08/03/20  9:18 AM   Result Value Ref Range    POC Glucose 267 (H) 65 - 140 mg/dl   Fingerstick Glucose (POCT)    Collection Time: 08/03/20 11:07 AM   Result Value Ref Range    POC Glucose 241 (H) 65 - 140 mg/dl   Fingerstick Glucose (POCT)    Collection Time: 08/03/20  4:06 PM   Result Value Ref Range    POC Glucose 212 (H) 65 - 140 mg/dl   Fingerstick Glucose (POCT)    Collection Time: 08/03/20  8:58 PM   Result Value Ref Range    POC Glucose 194 (H) 65 - 140 mg/dl   Basic metabolic panel    Collection Time: 08/04/20  5:33 AM   Result Value Ref Range    Sodium 141 136 - 145 mmol/L    Potassium 4 5 3 5 - 5 3 mmol/L    Chloride 106 100 - 108 mmol/L    CO2 29 21 - 32 mmol/L    ANION GAP 6 4 - 13 mmol/L    BUN 36 (H) 5 - 25 mg/dL    Creatinine 1 65 (H) 0 60 - 1 30 mg/dL    Glucose 137 65 - 140 mg/dL    Calcium 8 7 8 3 - 10 1 mg/dL    eGFR 38 ml/min/1 73sq m   CBC    Collection Time: 08/04/20  5:33 AM   Result Value Ref Range    WBC 6 40 4 31 - 10 16 Thousand/uL    RBC 3 87 (L) 3 88 - 5 62 Million/uL    Hemoglobin 12 0 12 0 - 17 0 g/dL    Hematocrit 36 8 36 5 - 49 3 %    MCV 95 82 - 98 fL    MCH 31 0 26 8 - 34 3 pg    MCHC 32 6 31 4 - 37 4 g/dL    RDW 13 0 11 6 - 15 1 %    Platelets 302 036 - 119 Thousands/uL    MPV 9 7 8 9 - 12 7 fL   Fingerstick Glucose (POCT)    Collection Time: 08/04/20  6:27 AM   Result Value Ref Range    POC Glucose 126 65 - 140 mg/dl   Fingerstick Glucose (POCT)    Collection Time: 08/04/20 11:11 AM   Result Value Ref Range    POC Glucose 246 (H) 65 - 140 mg/dl   Novel Coronavirus (Covid-19),PCR [facility]    Collection Time: 08/04/20 12:23 PM    Specimen: Nose; Nares   Result Value Ref Range    SARS-CoV-2 Negative Negative

## 2020-09-10 ENCOUNTER — TELEPHONE (OUTPATIENT)
Dept: NEPHROLOGY | Facility: CLINIC | Age: 85
End: 2020-09-10

## 2020-09-10 NOTE — TELEPHONE ENCOUNTER
Patient of Dr Kenneth Graham daughter called stating that she needed to reschedule the patient appointment because she has to work and the patient has no ride  The patient appointment was reschedule to 10/15/2020 and the patient daughter understood and was okay with the patient new day and time of his appointment   Samson Mckay,

## 2020-09-10 NOTE — TELEPHONE ENCOUNTER
LM to do labs prior appt scheduled for 09/15/20 with Dr Emmanuel Rodriguez  Asked to call back at [phone] if any questions

## 2020-09-16 ENCOUNTER — TELEPHONE (OUTPATIENT)
Dept: OTHER | Facility: OTHER | Age: 85
End: 2020-09-16

## 2020-09-16 NOTE — TELEPHONE ENCOUNTER
Pt took his own BP prior to Ehsan Cardenas coming in and he remembers it being 176 this was in the morning later the nurse arrived and took it for him and it was 186/66 she took it sometime before  His daughter prepares his meds if there is a need for a change please contact her  She wanted the Dr  to know bc she sees that his BP lately is rising

## 2020-09-18 ENCOUNTER — TELEPHONE (OUTPATIENT)
Dept: INTERNAL MEDICINE CLINIC | Facility: CLINIC | Age: 85
End: 2020-09-18

## 2020-09-18 NOTE — TELEPHONE ENCOUNTER
Pt will be needing doxazosin (CARDURA) 8 MG tablet ordered @ [facility] and said she was informed that the next refill won't be covered    so  needs authorization done  Ramya Cruz She wasn't sure what needs to be done but said Maggie Jiang takes care of this each time    patient will need the medication in 2 weeks

## 2020-09-29 ENCOUNTER — TELEPHONE (OUTPATIENT)
Dept: INTERNAL MEDICINE CLINIC | Facility: CLINIC | Age: 85
End: 2020-09-29

## 2020-10-01 ENCOUNTER — TELEPHONE (OUTPATIENT)
Dept: CARDIOLOGY CLINIC | Facility: CLINIC | Age: 85
End: 2020-10-01

## 2020-10-06 ENCOUNTER — OFFICE VISIT (OUTPATIENT)
Dept: CARDIOLOGY CLINIC | Facility: CLINIC | Age: 85
End: 2020-10-06
Payer: MEDICARE

## 2020-10-06 VITALS
BODY MASS INDEX: 29.47 KG/M2 | HEART RATE: 47 BPM | SYSTOLIC BLOOD PRESSURE: 164 MMHG | WEIGHT: 199 LBS | OXYGEN SATURATION: 96 % | DIASTOLIC BLOOD PRESSURE: 80 MMHG | HEIGHT: 69 IN

## 2020-10-06 DIAGNOSIS — I10 ESSENTIAL HYPERTENSION: ICD-10-CM

## 2020-10-06 DIAGNOSIS — I35.0 NONRHEUMATIC AORTIC VALVE STENOSIS: ICD-10-CM

## 2020-10-06 DIAGNOSIS — R00.1 BRADYCARDIA: ICD-10-CM

## 2020-10-06 DIAGNOSIS — I25.10 CORONARY ARTERY DISEASE INVOLVING NATIVE CORONARY ARTERY OF NATIVE HEART WITHOUT ANGINA PECTORIS: Primary | ICD-10-CM

## 2020-10-06 DIAGNOSIS — I48.0 PAROXYSMAL ATRIAL FIBRILLATION (HCC): ICD-10-CM

## 2020-10-06 DIAGNOSIS — E78.2 MIXED HYPERLIPIDEMIA: ICD-10-CM

## 2020-10-06 PROCEDURE — 99214 OFFICE O/P EST MOD 30 MIN: CPT | Performed by: INTERNAL MEDICINE

## 2020-10-08 ENCOUNTER — TELEPHONE (OUTPATIENT)
Dept: NEPHROLOGY | Facility: CLINIC | Age: 85
End: 2020-10-08

## 2020-10-13 ENCOUNTER — LAB (OUTPATIENT)
Dept: LAB | Facility: HOSPITAL | Age: 85
End: 2020-10-13
Attending: INTERNAL MEDICINE
Payer: MEDICARE

## 2020-10-13 DIAGNOSIS — E55.9 VITAMIN D DEFICIENCY: ICD-10-CM

## 2020-10-13 DIAGNOSIS — I12.9 HYPERTENSIVE KIDNEY DISEASE WITH STAGE 3 CHRONIC KIDNEY DISEASE (HCC): ICD-10-CM

## 2020-10-13 DIAGNOSIS — R80.8 OTHER PROTEINURIA: ICD-10-CM

## 2020-10-13 DIAGNOSIS — N18.30 HYPERTENSIVE KIDNEY DISEASE WITH STAGE 3 CHRONIC KIDNEY DISEASE (HCC): ICD-10-CM

## 2020-10-13 DIAGNOSIS — N18.30 STAGE 3 CHRONIC KIDNEY DISEASE (HCC): ICD-10-CM

## 2020-10-13 LAB
25(OH)D3 SERPL-MCNC: 44.4 NG/ML (ref 30–100)
ANION GAP SERPL CALCULATED.3IONS-SCNC: 8 MMOL/L (ref 4–13)
BACTERIA UR QL AUTO: ABNORMAL /HPF
BASOPHILS # BLD AUTO: 0.05 THOUSANDS/ΜL (ref 0–0.1)
BASOPHILS NFR BLD AUTO: 1 % (ref 0–1)
BILIRUB UR QL STRIP: NEGATIVE
BUN SERPL-MCNC: 34 MG/DL (ref 5–25)
CALCIUM SERPL-MCNC: 9.3 MG/DL (ref 8.3–10.1)
CHLORIDE SERPL-SCNC: 104 MMOL/L (ref 100–108)
CLARITY UR: CLEAR
CO2 SERPL-SCNC: 27 MMOL/L (ref 21–32)
COLOR UR: YELLOW
CREAT SERPL-MCNC: 1.86 MG/DL (ref 0.6–1.3)
CREAT UR-MCNC: 197 MG/DL
EOSINOPHIL # BLD AUTO: 0.42 THOUSAND/ΜL (ref 0–0.61)
EOSINOPHIL NFR BLD AUTO: 7 % (ref 0–6)
ERYTHROCYTE [DISTWIDTH] IN BLOOD BY AUTOMATED COUNT: 13.2 % (ref 11.6–15.1)
GFR SERPL CREATININE-BSD FRML MDRD: 33 ML/MIN/1.73SQ M
GLUCOSE P FAST SERPL-MCNC: 142 MG/DL (ref 65–99)
GLUCOSE UR STRIP-MCNC: NEGATIVE MG/DL
HCT VFR BLD AUTO: 37.7 % (ref 36.5–49.3)
HGB BLD-MCNC: 12 G/DL (ref 12–17)
HGB UR QL STRIP.AUTO: NEGATIVE
IMM GRANULOCYTES # BLD AUTO: 0.02 THOUSAND/UL (ref 0–0.2)
IMM GRANULOCYTES NFR BLD AUTO: 0 % (ref 0–2)
KETONES UR STRIP-MCNC: NEGATIVE MG/DL
LEUKOCYTE ESTERASE UR QL STRIP: NEGATIVE
LYMPHOCYTES # BLD AUTO: 2.06 THOUSANDS/ΜL (ref 0.6–4.47)
LYMPHOCYTES NFR BLD AUTO: 35 % (ref 14–44)
MCH RBC QN AUTO: 31 PG (ref 26.8–34.3)
MCHC RBC AUTO-ENTMCNC: 31.8 G/DL (ref 31.4–37.4)
MCV RBC AUTO: 97 FL (ref 82–98)
MICROALBUMIN UR-MCNC: 475 MG/L (ref 0–20)
MICROALBUMIN/CREAT 24H UR: 241 MG/G CREATININE (ref 0–30)
MONOCYTES # BLD AUTO: 0.4 THOUSAND/ΜL (ref 0.17–1.22)
MONOCYTES NFR BLD AUTO: 7 % (ref 4–12)
NEUTROPHILS # BLD AUTO: 2.98 THOUSANDS/ΜL (ref 1.85–7.62)
NEUTS SEG NFR BLD AUTO: 50 % (ref 43–75)
NITRITE UR QL STRIP: NEGATIVE
NON-SQ EPI CELLS URNS QL MICRO: ABNORMAL /HPF
NRBC BLD AUTO-RTO: 0 /100 WBCS
PH UR STRIP.AUTO: 6 [PH]
PHOSPHATE SERPL-MCNC: 3.1 MG/DL (ref 2.3–4.1)
PLATELET # BLD AUTO: 171 THOUSANDS/UL (ref 149–390)
PMV BLD AUTO: 9.8 FL (ref 8.9–12.7)
POTASSIUM SERPL-SCNC: 4.7 MMOL/L (ref 3.5–5.3)
PROT UR STRIP-MCNC: ABNORMAL MG/DL
PTH-INTACT SERPL-MCNC: 60.5 PG/ML (ref 18.4–80.1)
RBC # BLD AUTO: 3.87 MILLION/UL (ref 3.88–5.62)
RBC #/AREA URNS AUTO: ABNORMAL /HPF
SODIUM SERPL-SCNC: 139 MMOL/L (ref 136–145)
SP GR UR STRIP.AUTO: 1.02 (ref 1–1.03)
URATE SERPL-MCNC: 7.1 MG/DL (ref 4.2–8)
UROBILINOGEN UR QL STRIP.AUTO: 0.2 E.U./DL
WBC # BLD AUTO: 5.93 THOUSAND/UL (ref 4.31–10.16)
WBC #/AREA URNS AUTO: ABNORMAL /HPF

## 2020-10-13 PROCEDURE — 85025 COMPLETE CBC W/AUTO DIFF WBC: CPT

## 2020-10-13 PROCEDURE — 82306 VITAMIN D 25 HYDROXY: CPT

## 2020-10-13 PROCEDURE — 82043 UR ALBUMIN QUANTITATIVE: CPT

## 2020-10-13 PROCEDURE — 84550 ASSAY OF BLOOD/URIC ACID: CPT

## 2020-10-13 PROCEDURE — 81001 URINALYSIS AUTO W/SCOPE: CPT

## 2020-10-13 PROCEDURE — 80048 BASIC METABOLIC PNL TOTAL CA: CPT

## 2020-10-13 PROCEDURE — 36415 COLL VENOUS BLD VENIPUNCTURE: CPT

## 2020-10-13 PROCEDURE — 83970 ASSAY OF PARATHORMONE: CPT

## 2020-10-13 PROCEDURE — 84100 ASSAY OF PHOSPHORUS: CPT

## 2020-10-13 PROCEDURE — 82570 ASSAY OF URINE CREATININE: CPT

## 2020-10-15 ENCOUNTER — OFFICE VISIT (OUTPATIENT)
Dept: NEPHROLOGY | Facility: CLINIC | Age: 85
End: 2020-10-15
Payer: MEDICARE

## 2020-10-15 VITALS
DIASTOLIC BLOOD PRESSURE: 52 MMHG | HEIGHT: 69 IN | SYSTOLIC BLOOD PRESSURE: 130 MMHG | BODY MASS INDEX: 32.58 KG/M2 | RESPIRATION RATE: 16 BRPM | TEMPERATURE: 96.7 F | WEIGHT: 220 LBS

## 2020-10-15 DIAGNOSIS — N18.30 STAGE 3 CHRONIC KIDNEY DISEASE, UNSPECIFIED WHETHER STAGE 3A OR 3B CKD (HCC): Primary | ICD-10-CM

## 2020-10-15 DIAGNOSIS — E55.9 VITAMIN D DEFICIENCY: ICD-10-CM

## 2020-10-15 DIAGNOSIS — R80.8 OTHER PROTEINURIA: ICD-10-CM

## 2020-10-15 DIAGNOSIS — N18.30 HYPERTENSIVE KIDNEY DISEASE WITH STAGE 3 CHRONIC KIDNEY DISEASE, UNSPECIFIED WHETHER STAGE 3A OR 3B CKD (HCC): ICD-10-CM

## 2020-10-15 DIAGNOSIS — I12.9 HYPERTENSIVE KIDNEY DISEASE WITH STAGE 3 CHRONIC KIDNEY DISEASE, UNSPECIFIED WHETHER STAGE 3A OR 3B CKD (HCC): ICD-10-CM

## 2020-10-15 PROCEDURE — 99214 OFFICE O/P EST MOD 30 MIN: CPT | Performed by: INTERNAL MEDICINE

## 2020-10-23 ENCOUNTER — TELEPHONE (OUTPATIENT)
Dept: INTERNAL MEDICINE CLINIC | Facility: CLINIC | Age: 85
End: 2020-10-23

## 2020-10-23 ENCOUNTER — TELEPHONE (OUTPATIENT)
Dept: CARDIOLOGY CLINIC | Facility: CLINIC | Age: 85
End: 2020-10-23

## 2020-11-12 ENCOUNTER — OFFICE VISIT (OUTPATIENT)
Dept: INTERNAL MEDICINE CLINIC | Facility: CLINIC | Age: 85
End: 2020-11-12
Payer: MEDICARE

## 2020-11-12 VITALS
HEART RATE: 64 BPM | WEIGHT: 213.6 LBS | HEIGHT: 69 IN | TEMPERATURE: 97.3 F | BODY MASS INDEX: 31.64 KG/M2 | OXYGEN SATURATION: 96 % | DIASTOLIC BLOOD PRESSURE: 60 MMHG | SYSTOLIC BLOOD PRESSURE: 116 MMHG

## 2020-11-12 DIAGNOSIS — Z00.00 MEDICARE ANNUAL WELLNESS VISIT, SUBSEQUENT: ICD-10-CM

## 2020-11-12 DIAGNOSIS — I12.9 HYPERTENSIVE KIDNEY DISEASE WITH STAGE 3B CHRONIC KIDNEY DISEASE (HCC): Chronic | ICD-10-CM

## 2020-11-12 DIAGNOSIS — E11.22 TYPE 2 DIABETES MELLITUS WITH STAGE 3B CHRONIC KIDNEY DISEASE, WITHOUT LONG-TERM CURRENT USE OF INSULIN (HCC): Primary | Chronic | ICD-10-CM

## 2020-11-12 DIAGNOSIS — I48.0 PAROXYSMAL ATRIAL FIBRILLATION (HCC): Chronic | ICD-10-CM

## 2020-11-12 DIAGNOSIS — Z86.73 HISTORY OF CVA (CEREBROVASCULAR ACCIDENT): Chronic | ICD-10-CM

## 2020-11-12 DIAGNOSIS — I25.10 CAD IN NATIVE ARTERY: Chronic | ICD-10-CM

## 2020-11-12 DIAGNOSIS — Z23 NEED FOR INFLUENZA VACCINATION: ICD-10-CM

## 2020-11-12 DIAGNOSIS — I35.0 NONRHEUMATIC AORTIC VALVE STENOSIS: Chronic | ICD-10-CM

## 2020-11-12 DIAGNOSIS — N18.32 HYPERTENSIVE KIDNEY DISEASE WITH STAGE 3B CHRONIC KIDNEY DISEASE (HCC): Chronic | ICD-10-CM

## 2020-11-12 DIAGNOSIS — I10 ESSENTIAL HYPERTENSION: Chronic | ICD-10-CM

## 2020-11-12 DIAGNOSIS — N18.32 TYPE 2 DIABETES MELLITUS WITH STAGE 3B CHRONIC KIDNEY DISEASE, WITHOUT LONG-TERM CURRENT USE OF INSULIN (HCC): Primary | Chronic | ICD-10-CM

## 2020-11-12 DIAGNOSIS — Z86.718 HISTORY OF DVT OF LOWER EXTREMITY: Chronic | ICD-10-CM

## 2020-11-12 PROBLEM — I63.9 STROKE (HCC): Status: RESOLVED | Noted: 2017-11-24 | Resolved: 2020-11-12

## 2020-11-12 PROBLEM — R80.8 OTHER PROTEINURIA: Chronic | Status: ACTIVE | Noted: 2019-10-08

## 2020-11-12 PROBLEM — E78.5 HLD (HYPERLIPIDEMIA): Chronic | Status: ACTIVE | Noted: 2017-11-24

## 2020-11-12 PROBLEM — N17.9 ACUTE KIDNEY INJURY (HCC): Status: RESOLVED | Noted: 2018-01-26 | Resolved: 2020-11-12

## 2020-11-12 PROBLEM — I69.354 HEMIPLEGIA AND HEMIPARESIS FOLLOWING CEREBRAL INFARCTION AFFECTING LEFT NON-DOMINANT SIDE (HCC): Status: RESOLVED | Noted: 2019-04-10 | Resolved: 2020-11-12

## 2020-11-12 PROBLEM — Z51.81 ENCOUNTER FOR MONITORING ANTIPLATELET THERAPY: Status: RESOLVED | Noted: 2018-10-09 | Resolved: 2020-11-12

## 2020-11-12 PROBLEM — E11.9 DM2 (DIABETES MELLITUS, TYPE 2) (HCC): Status: RESOLVED | Noted: 2017-11-24 | Resolved: 2020-11-12

## 2020-11-12 PROBLEM — I67.9 CEREBROVASCULAR SMALL VESSEL DISEASE: Chronic | Status: ACTIVE | Noted: 2020-11-12

## 2020-11-12 PROBLEM — E55.9 VITAMIN D DEFICIENCY: Chronic | Status: ACTIVE | Noted: 2020-02-05

## 2020-11-12 PROBLEM — I82.622 ACUTE DEEP VEIN THROMBOSIS (DVT) OF BRACHIAL VEIN OF LEFT UPPER EXTREMITY (HCC): Status: RESOLVED | Noted: 2017-11-24 | Resolved: 2020-11-12

## 2020-11-12 PROBLEM — Z79.02 ENCOUNTER FOR MONITORING ANTIPLATELET THERAPY: Status: RESOLVED | Noted: 2018-10-09 | Resolved: 2020-11-12

## 2020-11-12 PROBLEM — N18.30 HYPERTENSIVE KIDNEY DISEASE WITH STAGE 3 CHRONIC KIDNEY DISEASE (HCC): Chronic | Status: ACTIVE | Noted: 2019-10-08

## 2020-11-12 PROBLEM — R00.1 SYMPTOMATIC BRADYCARDIA: Status: RESOLVED | Noted: 2020-07-12 | Resolved: 2020-11-12

## 2020-11-12 PROBLEM — R55 POSTURAL DIZZINESS WITH PRESYNCOPE: Status: RESOLVED | Noted: 2020-07-12 | Resolved: 2020-11-12

## 2020-11-12 PROBLEM — S22.39XA RIB FRACTURE: Status: RESOLVED | Noted: 2020-07-31 | Resolved: 2020-11-12

## 2020-11-12 PROBLEM — I48.91 ATRIAL FIBRILLATION (HCC): Chronic | Status: ACTIVE | Noted: 2019-03-04

## 2020-11-12 PROBLEM — S22.42XD CLOSED FRACTURE OF MULTIPLE RIBS OF LEFT SIDE WITH ROUTINE HEALING: Status: RESOLVED | Noted: 2020-09-03 | Resolved: 2020-11-12

## 2020-11-12 PROBLEM — I65.1 BASILAR ARTERY STENOSIS: Chronic | Status: ACTIVE | Noted: 2020-05-04

## 2020-11-12 PROBLEM — M25.551 RIGHT HIP PAIN: Status: RESOLVED | Noted: 2017-11-24 | Resolved: 2020-11-12

## 2020-11-12 PROBLEM — R42 POSTURAL DIZZINESS WITH PRESYNCOPE: Status: RESOLVED | Noted: 2020-07-12 | Resolved: 2020-11-12

## 2020-11-12 PROBLEM — E11.9 TYPE 2 DIABETES MELLITUS WITHOUT COMPLICATION (HCC): Status: RESOLVED | Noted: 2019-04-27 | Resolved: 2020-11-12

## 2020-11-12 PROBLEM — Z91.89 AT RISK FOR POLYPHARMACY: Status: RESOLVED | Noted: 2018-01-26 | Resolved: 2020-11-12

## 2020-11-12 PROBLEM — R41.3 MEMORY DIFFICULTY: Status: RESOLVED | Noted: 2019-12-13 | Resolved: 2020-11-12
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
## 2020-11-12 PROCEDURE — G0008 ADMIN INFLUENZA VIRUS VAC: HCPCS | Performed by: INTERNAL MEDICINE

## 2020-11-12 PROCEDURE — 99214 OFFICE O/P EST MOD 30 MIN: CPT | Performed by: INTERNAL MEDICINE

## 2020-11-12 PROCEDURE — G0439 PPPS, SUBSEQ VISIT: HCPCS | Performed by: INTERNAL MEDICINE

## 2020-11-12 PROCEDURE — 90662 IIV NO PRSV INCREASED AG IM: CPT | Performed by: INTERNAL MEDICINE

## 2021-01-21 DIAGNOSIS — I10 ESSENTIAL HYPERTENSION: ICD-10-CM

## 2021-01-21 RX ORDER — LISINOPRIL 40 MG/1
40 TABLET ORAL DAILY
Qty: 90 TABLET | Refills: 3 | Status: SHIPPED | OUTPATIENT
Start: 2021-01-21 | End: 2021-06-11 | Stop reason: SDUPTHER

## 2021-01-21 NOTE — TELEPHONE ENCOUNTER
RX refill  lisinopril (ZESTRIL) 40 mg tablet   [facility]  90 days refill    Question  Pt states the apixaban (ELIQUIS) 2 5 mg  Its over $100 00 with insurance without is $600 00  What else can he take?     Call [place] [phone]

## 2021-01-31 DIAGNOSIS — Z23 ENCOUNTER FOR IMMUNIZATION: ICD-10-CM

## 2021-02-05 ENCOUNTER — TELEPHONE (OUTPATIENT)
Dept: CARDIOLOGY CLINIC | Facility: CLINIC | Age: 86
End: 2021-02-05

## 2021-02-05 ENCOUNTER — TELEPHONE (OUTPATIENT)
Dept: INTERNAL MEDICINE CLINIC | Facility: CLINIC | Age: 86
End: 2021-02-05

## 2021-02-05 DIAGNOSIS — I48.0 PAROXYSMAL ATRIAL FIBRILLATION (HCC): ICD-10-CM

## 2021-02-05 NOTE — TELEPHONE ENCOUNTER
Please get samples of Eliquis 2 5 mg for at least 2 weeks  They can check the price of Xarelto 15 mg daily  Even if that is to expensive, we need to consider switching to warfarin  Please let me know after talking to the daughter    Thank you

## 2021-02-05 NOTE — TELEPHONE ENCOUNTER
Gio Hayes his daughter called  She would like to know if she can have Samples of Eliquis 2 5 mg  It is very expensive with pharmacy and he only has a few left  She also wants to know if there is anything equivalent to Eliquis that Maddy Beverly can take        [phone]

## 2021-02-05 NOTE — TELEPHONE ENCOUNTER
----- Message from Tanisha Hill sent at 2/5/2021 10:40 AM EST -----  Regarding: FW: Prescription Question  Contact: [phone]    ----- Message -----  From: Jensen Huang  Sent: 2/4/2021   8:18 PM EST  To: [facility] Med Clinical  Subject: Prescription Question                            This is Dontrell Crain handle the medicines for my father, patient, Franklyn Nelson  I contacted your office 1/18/21 looking for a substitute for the Eliquis(2 5mg) taken 2x/day  The Eliquis is expensive  I have not received a replacement, through [facility], nor a phone call back from you  My father has enough for 6 days  I will be calling, but I need an answer on a substitute  I will also need a RX for a quick refill-or samples would be great  Thank you for your attention to this matter      Roger Carrasco  [phone]

## 2021-02-08 ENCOUNTER — IMMUNIZATIONS (OUTPATIENT)
Dept: FAMILY MEDICINE CLINIC | Facility: HOSPITAL | Age: 86
End: 2021-02-08

## 2021-02-08 DIAGNOSIS — Z23 ENCOUNTER FOR IMMUNIZATION: Primary | ICD-10-CM

## 2021-02-08 PROCEDURE — 91301 SARS-COV-2 / COVID-19 MRNA VACCINE (MODERNA) 100 MCG: CPT

## 2021-02-08 PROCEDURE — 0011A SARS-COV-2 / COVID-19 MRNA VACCINE (MODERNA) 100 MCG: CPT

## 2021-03-06 ENCOUNTER — IMMUNIZATIONS (OUTPATIENT)
Dept: FAMILY MEDICINE CLINIC | Facility: HOSPITAL | Age: 86
End: 2021-03-06

## 2021-03-06 DIAGNOSIS — Z23 ENCOUNTER FOR IMMUNIZATION: Primary | ICD-10-CM

## 2021-03-06 PROCEDURE — 0012A SARS-COV-2 / COVID-19 MRNA VACCINE (MODERNA) 100 MCG: CPT

## 2021-03-06 PROCEDURE — 91301 SARS-COV-2 / COVID-19 MRNA VACCINE (MODERNA) 100 MCG: CPT

## 2021-03-08 ENCOUNTER — TELEPHONE (OUTPATIENT)
Dept: INTERNAL MEDICINE CLINIC | Facility: CLINIC | Age: 86
End: 2021-03-08

## 2021-03-08 ENCOUNTER — LAB (OUTPATIENT)
Dept: LAB | Facility: HOSPITAL | Age: 86
End: 2021-03-08
Attending: INTERNAL MEDICINE
Payer: MEDICARE

## 2021-03-08 DIAGNOSIS — E11.22 TYPE 2 DIABETES MELLITUS WITH STAGE 3B CHRONIC KIDNEY DISEASE, WITHOUT LONG-TERM CURRENT USE OF INSULIN (HCC): Chronic | ICD-10-CM

## 2021-03-08 DIAGNOSIS — N18.30 TYPE 2 DIABETES MELLITUS WITH STAGE 3 CHRONIC KIDNEY DISEASE, WITHOUT LONG-TERM CURRENT USE OF INSULIN (HCC): ICD-10-CM

## 2021-03-08 DIAGNOSIS — N18.32 TYPE 2 DIABETES MELLITUS WITH STAGE 3B CHRONIC KIDNEY DISEASE, WITHOUT LONG-TERM CURRENT USE OF INSULIN (HCC): Chronic | ICD-10-CM

## 2021-03-08 DIAGNOSIS — E11.22 TYPE 2 DIABETES MELLITUS WITH STAGE 3 CHRONIC KIDNEY DISEASE, WITHOUT LONG-TERM CURRENT USE OF INSULIN (HCC): ICD-10-CM

## 2021-03-08 LAB
ALBUMIN SERPL BCP-MCNC: 3.1 G/DL (ref 3.5–5)
ALP SERPL-CCNC: 51 U/L (ref 46–116)
ALT SERPL W P-5'-P-CCNC: 16 U/L (ref 12–78)
ANION GAP SERPL CALCULATED.3IONS-SCNC: 4 MMOL/L (ref 4–13)
AST SERPL W P-5'-P-CCNC: 14 U/L (ref 5–45)
BASOPHILS # BLD AUTO: 0.03 THOUSANDS/ΜL (ref 0–0.1)
BASOPHILS NFR BLD AUTO: 1 % (ref 0–1)
BILIRUB SERPL-MCNC: 0.37 MG/DL (ref 0.2–1)
BUN SERPL-MCNC: 33 MG/DL (ref 5–25)
CALCIUM ALBUM COR SERPL-MCNC: 9.6 MG/DL (ref 8.3–10.1)
CALCIUM SERPL-MCNC: 8.9 MG/DL (ref 8.3–10.1)
CHLORIDE SERPL-SCNC: 99 MMOL/L (ref 100–108)
CHOLEST SERPL-MCNC: 163 MG/DL (ref 50–200)
CO2 SERPL-SCNC: 29 MMOL/L (ref 21–32)
CREAT SERPL-MCNC: 2.25 MG/DL (ref 0.6–1.3)
EOSINOPHIL # BLD AUTO: 0.19 THOUSAND/ΜL (ref 0–0.61)
EOSINOPHIL NFR BLD AUTO: 4 % (ref 0–6)
ERYTHROCYTE [DISTWIDTH] IN BLOOD BY AUTOMATED COUNT: 13 % (ref 11.6–15.1)
EST. AVERAGE GLUCOSE BLD GHB EST-MCNC: 151 MG/DL
GFR SERPL CREATININE-BSD FRML MDRD: 26 ML/MIN/1.73SQ M
GLUCOSE SERPL-MCNC: 356 MG/DL (ref 65–140)
HBA1C MFR BLD: 6.9 %
HCT VFR BLD AUTO: 38.9 % (ref 36.5–49.3)
HDLC SERPL-MCNC: 43 MG/DL
HGB BLD-MCNC: 12.5 G/DL (ref 12–17)
IMM GRANULOCYTES # BLD AUTO: 0.02 THOUSAND/UL (ref 0–0.2)
IMM GRANULOCYTES NFR BLD AUTO: 0 % (ref 0–2)
LDLC SERPL CALC-MCNC: 99 MG/DL (ref 0–100)
LYMPHOCYTES # BLD AUTO: 0.94 THOUSANDS/ΜL (ref 0.6–4.47)
LYMPHOCYTES NFR BLD AUTO: 19 % (ref 14–44)
MCH RBC QN AUTO: 30.9 PG (ref 26.8–34.3)
MCHC RBC AUTO-ENTMCNC: 32.1 G/DL (ref 31.4–37.4)
MCV RBC AUTO: 96 FL (ref 82–98)
MONOCYTES # BLD AUTO: 0.47 THOUSAND/ΜL (ref 0.17–1.22)
MONOCYTES NFR BLD AUTO: 10 % (ref 4–12)
NEUTROPHILS # BLD AUTO: 3.24 THOUSANDS/ΜL (ref 1.85–7.62)
NEUTS SEG NFR BLD AUTO: 66 % (ref 43–75)
NONHDLC SERPL-MCNC: 120 MG/DL
NRBC BLD AUTO-RTO: 0 /100 WBCS
PLATELET # BLD AUTO: 151 THOUSANDS/UL (ref 149–390)
PMV BLD AUTO: 9.5 FL (ref 8.9–12.7)
POTASSIUM SERPL-SCNC: 4.8 MMOL/L (ref 3.5–5.3)
PROT SERPL-MCNC: 7 G/DL (ref 6.4–8.2)
RBC # BLD AUTO: 4.05 MILLION/UL (ref 3.88–5.62)
SODIUM SERPL-SCNC: 132 MMOL/L (ref 136–145)
TRIGL SERPL-MCNC: 105 MG/DL
TSH SERPL DL<=0.05 MIU/L-ACNC: 1.06 UIU/ML (ref 0.36–3.74)
WBC # BLD AUTO: 4.89 THOUSAND/UL (ref 4.31–10.16)

## 2021-03-08 PROCEDURE — 85025 COMPLETE CBC W/AUTO DIFF WBC: CPT

## 2021-03-08 PROCEDURE — 80061 LIPID PANEL: CPT

## 2021-03-08 PROCEDURE — 80053 COMPREHEN METABOLIC PANEL: CPT

## 2021-03-08 PROCEDURE — 83036 HEMOGLOBIN GLYCOSYLATED A1C: CPT

## 2021-03-08 PROCEDURE — 84443 ASSAY THYROID STIM HORMONE: CPT

## 2021-03-08 PROCEDURE — 36415 COLL VENOUS BLD VENIPUNCTURE: CPT

## 2021-03-09 ENCOUNTER — OFFICE VISIT (OUTPATIENT)
Dept: INTERNAL MEDICINE CLINIC | Facility: CLINIC | Age: 86
End: 2021-03-09
Payer: MEDICARE

## 2021-03-09 VITALS
WEIGHT: 218.8 LBS | DIASTOLIC BLOOD PRESSURE: 82 MMHG | SYSTOLIC BLOOD PRESSURE: 120 MMHG | HEART RATE: 67 BPM | TEMPERATURE: 98 F | OXYGEN SATURATION: 98 % | BODY MASS INDEX: 32.31 KG/M2

## 2021-03-09 DIAGNOSIS — N18.32 TYPE 2 DIABETES MELLITUS WITH STAGE 3B CHRONIC KIDNEY DISEASE, WITHOUT LONG-TERM CURRENT USE OF INSULIN (HCC): Primary | Chronic | ICD-10-CM

## 2021-03-09 DIAGNOSIS — I25.10 CAD IN NATIVE ARTERY: Chronic | ICD-10-CM

## 2021-03-09 DIAGNOSIS — E78.2 MIXED HYPERLIPIDEMIA: Chronic | ICD-10-CM

## 2021-03-09 DIAGNOSIS — I10 ESSENTIAL HYPERTENSION: Chronic | ICD-10-CM

## 2021-03-09 DIAGNOSIS — G89.29 CHRONIC BILATERAL LOW BACK PAIN WITHOUT SCIATICA: ICD-10-CM

## 2021-03-09 DIAGNOSIS — I48.0 PAROXYSMAL ATRIAL FIBRILLATION (HCC): Chronic | ICD-10-CM

## 2021-03-09 DIAGNOSIS — M54.50 CHRONIC BILATERAL LOW BACK PAIN WITHOUT SCIATICA: ICD-10-CM

## 2021-03-09 DIAGNOSIS — E11.22 TYPE 2 DIABETES MELLITUS WITH STAGE 3B CHRONIC KIDNEY DISEASE, WITHOUT LONG-TERM CURRENT USE OF INSULIN (HCC): Primary | Chronic | ICD-10-CM

## 2021-03-09 PROCEDURE — 99214 OFFICE O/P EST MOD 30 MIN: CPT | Performed by: INTERNAL MEDICINE

## 2021-03-09 NOTE — PATIENT INSTRUCTIONS
Type 2 Diabetes Management for Adults   WHAT YOU NEED TO KNOW:   What is type 2 diabetes? Type 2 diabetes is a disease that affects how your body uses glucose (sugar)  Normally, when the blood sugar level increases, the pancreas makes more insulin  Insulin helps move sugar out of the blood so it can be used for energy  Type 2 diabetes develops because either the body cannot make enough insulin, or it cannot use the insulin correctly  Type 2 diabetes can be controlled to prevent damage to your heart, blood vessels, and other organs  What can I do to manage my blood sugar levels? · Make healthy food choices  Healthy foods can give you energy to learn and be active  Healthy foods can also help you keep your blood sugar in balance, and manage or lose weight safely  Work with a dietitian to develop a meal plan that works for you and your schedule  A dietitian can help you learn how to eat the right amount of carbohydrates during your meals and snacks  Carbohydrates can raise your blood sugar if you eat too many at one time  Some foods that contain carbohydrates include breads, cereals, rice, pasta, and sweets  · Make healthy beverage choices  Drink water  Decrease the amount of drinks with sugar substitutes you have, such as diet sodas  Avoid sugar-sweetened drinks, such as regular sodas and fruit juice  · Get regular physical activity  Physical activity helps to lower your blood sugar levels  It can also help you manage your weight  Get at least 150 minutes of moderate to vigorous aerobic physical activity each week  Do not miss more than 2 days in a row  Do not sit longer than 30 minutes at a time  Your healthcare provider can help you create an activity plan  The plan can include the best activities for you and can help you build your strength and endurance  · Maintain a healthy weight  Ask your healthcare provider how much you should weigh   Ask him or her to help you create a safe weight loss plan if you are overweight  Weight loss can improve your blood sugar levels  · Check your blood sugar level as directed and as needed  Ask your healthcare provider what your blood sugar levels should be  ? Look at your schedule and make a plan for when you will check your blood sugar levels throughout the day  ? Check more often if you think your blood sugar is too high or too low  This will allow you to take care of any low or high blood sugar levels so they do not interfere with your activities  ? Rotate the sites where you do fingersticks  This will help make the checks less painful, and make fingerstick sites less noticeable  ? Write down your blood sugar levels so you can show them to your healthcare provider during your visits  Talk to your healthcare provider if you are having trouble keeping your blood sugar at the recommended levels  · Take your diabetes medicine or insulin as directed  You may need diabetes medicine, insulin, or both to help control your blood sugar levels  Your healthcare provider will teach you how and when to take your diabetes medicine or insulin  · Go to all follow-up appointments  Your healthcare provider may need to check your A1c every 3 months  An A1c test shows the average amount of sugar in your blood over the past 2 to 3 months  Your healthcare provider will tell you what your A1c level should be  What do I need to know about high blood sugar? High blood sugar may not cause any symptoms  It may cause you to feel more thirsty than usual or urinate more often than usual  Over time, high blood sugar levels can damage your nerves, blood vessels, tissues, and organs  · Large meals or large amounts of carbohydrates at one time can raise your blood sugar  · Decreased physical activity can raise your blood sugar  For example, your blood sugar can increase if you stop playing a sport or getting regular physical activity   Do not sit for longer than 90 minutes at a time  · Stress can raise your blood sugar  Ask your healthcare provider for help if you are having trouble managing stress  · Illness can raise your blood sugar  This can happen even if you eat less than usual while you are sick  Work with your healthcare provider to develop a sick day plan  This is a plan that helps you manage your blood sugar levels while you are sick  · A lower dose of medicine or insulin, or a late dose, can raise your blood sugar  There is not enough time for your medicine or insulin to work as it should if you take it late  When you take a lower dose, there is not enough medicine or insulin needed to lower your blood sugar  What do I need to know about low blood sugar? You can prevent symptoms such as shakiness, dizziness, irritability, or confusion by preventing your blood sugar from going too low  · Treat low blood sugar right away  Eat 15 grams of carbohydrate, such as 4 ounces of juice or 1 tube of glucose gel  Check your blood sugar again 10 to 15 minutes later  When your blood sugar goes back to normal, eat a meal or snack to prevent another decrease in blood sugar  ·          · Your blood sugar can get too low if you take diabetes medicine or insulin and do not eat enough food  It can also happen if you skip a meal or snack  · Increased physical activity can cause low blood sugar  If you use insulin, check your blood sugar before you exercise  If your blood sugar is below 100 mg/dL, eat 15 grams of carbohydrate  If you will exercise for more than 1 hour, check your blood sugar every 30 minutes  You may need to adjust your insulin before exercise  You may need a carbohydrate snack during or after exercise  What else can I do to manage my diabetes? · Wear medical alert jewelry or carry a card that says you have diabetes  Ask where to get these items  · Be safe when you drive    Check your blood sugar before you drive if you use insulin, and you think your blood sugar is low  If your blood sugar is low, eat 15 grams of carbohydrate and wait for your blood sugar to go back to normal  Keep snacks that contain carbohydrate in the car  If you feel like your blood sugar is low while you are driving, pull over and check your blood sugar level  Treat low blood sugar before you start driving again, if needed  · Know the risks if you choose to drink alcohol  Alcohol can cause your blood sugar levels to be low if you use insulin  Alcohol can cause high blood sugar levels and weight gain if you drink too much  Women should limit alcohol to 1 drink a day  Men should limit alcohol to 2 drinks a day  A drink of alcohol is 12 ounces of beer, 5 ounces of wine, or 1½ ounces of liquor  · Do not smoke  Nicotine can damage blood vessels and make it more difficult to manage your diabetes  Do not use e-cigarettes or smokeless tobacco in place of cigarettes or to help you quit  They still contain nicotine  Ask your healthcare provider for information if you currently smoke and need help quitting  · Have screenings for complications of diabetes and other conditions that happen with diabetes  You will need to be screened for kidney problems, high cholesterol, high blood pressure, blood vessel problems, eye problems, and eating disorders  Some screenings may begin right away and some may happen within the first 5 years of diagnosis  You will need to continue screenings through your lifetime  Keep your follow-up appointments with all providers  · Ask about vaccines  You have a higher risk for serious illness if you get the flu, pneumonia, or hepatitis  Ask your healthcare provider if you should get a flu, pneumonia, or hepatitis B vaccine, and when to get the vaccine  CARE AGREEMENT:   You have the right to help plan your care  Learn about your health condition and how it may be treated   Discuss treatment options with your healthcare providers to decide what care you want to receive  You always have the right to refuse treatment  The above information is an  only  It is not intended as medical advice for individual conditions or treatments  Talk to your doctor, nurse or pharmacist before following any medical regimen to see if it is safe and effective for you  © Copyright [address] Information is for End User's use only and may not be sold, redistributed or otherwise used for commercial purposes   All illustrations and images included in CareNotes® are the copyrighted property of [employer] D [employer] M [employer]  or [address] [place] [address]

## 2021-03-09 NOTE — PROGRESS NOTES
[place]    NAME: Darlene Schlatter  AGE: 82 y o  SEX: male  : 1935     DATE: 3/9/2021     Assessment and Plan:     1  Type 2 diabetes mellitus with stage 3b chronic kidney disease, without long-term current use of insulin ([facility] [address] )    Most recent A1c was 6 9 % on 3/8/2021  Blood sugars are acceptable for his age  Lab Results   Component Value Date    CREATININE 2 25 (H) 2021    CREATININE 1 86 (H) 10/13/2020    CREATININE 1 65 (H) 2020    CREATININE 1 67 (H) 2015    CREATININE 1 5 (H) 2015    CREATININE 1 3 2014    EGFR 26 2021    EGFR 33 10/13/2020    EGFR 38 2020    EGFR 44 2017      Follows with nephrology  Needs to drink more water and be more active  Will have repeat labs again before he sees nephrology in April or May     - Hemoglobin A1C; Future  - Basic metabolic panel; Future    2  Paroxysmal atrial fibrillation (HCC)    Stable and in NSR  Continue eliquis and lopressor  3  Essential hypertension    Bp is well controlled  Continue to monitor at home  Continue anti-hypertensives  4  CAD in native artery    Stable w/o angina  He is on appropriate cardiovascular regimen  5  Mixed hyperlipidemia    Continue lovaza as prescribed  Cholesterol is controlled  6  Chronic bilateral low back pain without sciatica    He needs to work his muscles more and stop being so sedentary  Referral to PT was given to him  - Ambulatory referral to Physical Therapy; Future    BMI Counseling: Body mass index is 32 31 kg/m²  The BMI is above normal  Nutrition recommendations include encouraging healthy choices of fruits and vegetables, limiting drinks that contain sugar, moderation in carbohydrate intake and increasing intake of lean protein  Return in about 4 months (around 2021) for Follow-up       Chief Complaint:     Chief Complaint   Patient presents with    Follow-up     4 month and labs- 03/08/2021        History of Present Illness:     Patient presents for follow-up and to go over labs  His home blood sugars and blood pressure readings are acceptable  Most recent A1c was 6 9 % on 3/8/2021  Has had a bump in his creatinine  Follows with a nephrologist  Does not drink enough water per his daughter  Drinks about 3 cups of coffee per day  Hasn't been active at all and complains of pain in his back which is chronic along with intermittent dizziness  Review of Systems:     Review of Systems   Constitutional: Positive for fatigue  Negative for activity change and appetite change  Respiratory: Negative for apnea, cough, chest tightness, shortness of breath and wheezing  Cardiovascular: Negative for chest pain, palpitations and leg swelling  Gastrointestinal: Negative for abdominal distention, abdominal pain, blood in stool, constipation, diarrhea, nausea and vomiting  Musculoskeletal: Positive for back pain and gait problem  Negative for arthralgias, joint swelling and myalgias  Neurological: Positive for dizziness  Negative for weakness, numbness and headaches  Psychiatric/Behavioral: Negative for behavioral problems, confusion, hallucinations, sleep disturbance and suicidal ideas  The patient is not nervous/anxious  Objective:     /82 (BP Location: Left arm, Patient Position: Sitting, Cuff Size: Standard)   Pulse 67   Temp 98 °F (36 7 °C) (Temporal) Comment: w/ tylenol  Wt 99 2 kg (218 lb 12 8 oz) Comment: w/ shoes denied off  SpO2 98%   BMI 32 31 kg/m²     Physical Exam  Vitals signs reviewed  Constitutional:       General: He is not in acute distress  Appearance: He is well-developed  He is obese  He is not diaphoretic  Neck:      Musculoskeletal: Neck supple  Thyroid: No thyromegaly  Vascular: No JVD  Cardiovascular:      Rate and Rhythm: Normal rate and regular rhythm  Pulses: Pulses are weak             Dorsalis pedis pulses are 1+ on the right side and 1+ on the left side  Posterior tibial pulses are 1+ on the right side and 1+ on the left side  Heart sounds: Normal heart sounds  No murmur  Pulmonary:      Effort: Pulmonary effort is normal  No respiratory distress  Breath sounds: Normal breath sounds  No wheezing or rales  Abdominal:      General: Bowel sounds are normal  There is no distension  Palpations: Abdomen is soft  Tenderness: There is no abdominal tenderness  Musculoskeletal:         General: Deformity (lumbar paraspinal tissue hypertonicity and spasm) present  Right lower leg: No edema  Left lower leg: No edema  Feet:      Right foot:      Skin integrity: No ulcer, skin breakdown, erythema, warmth, callus or dry skin  Left foot:      Skin integrity: No ulcer, skin breakdown, erythema, warmth, callus or dry skin  Lymphadenopathy:      Cervical: No cervical adenopathy  Skin:     General: Skin is warm and dry  Neurological:      Mental Status: He is alert  Psychiatric:         Mood and Affect: Mood normal          Behavior: Behavior normal      Diabetic Foot Exam  Patient's shoes and socks removed  Right Foot/Ankle   Right Foot Inspection  Skin Exam: skin normal and skin intact no dry skin, no warmth, no callus, no erythema, no maceration, no abnormal color, no pre-ulcer, no ulcer and no callus                          Toe Exam: ROM and strength within normal limits  Sensory     Proprioception: intact   Monofilament testing: intact  Vascular  Capillary refills: < 3 seconds  The right DP pulse is 1+  The right PT pulse is 1+       Left Foot/Ankle  Left Foot Inspection  Skin Exam: skin normal and skin intactno dry skin, no warmth, no erythema, no maceration, normal color, no pre-ulcer, no ulcer and no callus                         Toe Exam: ROM and strength within normal limits                   Sensory     Proprioception: intact  Monofilament: intact  Vascular  Capillary refills: < 3 seconds  The left DP pulse is 1+  The left PT pulse is 1+  Assign Risk Category:  No deformity present;  No loss of protective sensation; Weak pulses       Risk: 0         Lili Beasley,   MEDICAL [id number]

## 2021-03-19 DIAGNOSIS — I25.10 CORONARY ARTERY DISEASE INVOLVING NATIVE CORONARY ARTERY OF NATIVE HEART WITHOUT ANGINA PECTORIS: Primary | ICD-10-CM

## 2021-03-22 NOTE — PLAN OF CARE
Problem: PHYSICAL THERAPY ADULT  Goal: Performs mobility at highest level of function for planned discharge setting  See evaluation for individualized goals  Treatment/Interventions: Functional transfer training, LE strengthening/ROM, Therapeutic exercise, Endurance training, Patient/family training, Bed mobility, Gait training, Equipment eval/education  Equipment Recommended: Glenn Sanchez       See flowsheet documentation for full assessment, interventions and recommendations  Outcome: Progressing  Prognosis: Good  Problem List: Decreased strength, Decreased range of motion, Decreased endurance, Impaired balance, Decreased mobility, Decreased coordination, Decreased safety awareness, Pain, Orthopedic restrictions, Impaired judgement, Decreased cognition  Assessment: PRESENTLY THE PT  CONTINUES TO BE LIMITED BY ONGOING DECONDITIONING WITH COMPLICATED MEDICAL COURSE  CONTINUED USE OF VERBAL CUEING REQUIRED TO RECALL 3/3 HIP PRECAUTIONS AND TO APPLY PRECAUTIONS INTO SESSION  HE REMAINS APPROPIATE FOR ONGOING REHAB UPON D/C TO MAXIMIZE FUNCTIONAL MOBILITY AND CONDITIONING TO ALLOW FOR DECREASED RISK FOR FALLS  Barriers to Discharge: Decreased caregiver support     Recommendation:  (REHAB)     PT - OK to Discharge: (S)  ([address])    See flowsheet documentation for full assessment     [place] Gilberto, PTA 83 year old female from home ambulates w/ walker/cane, has past medical hx of MM, hypertension, RA, bladder incontinence came to ED c/o 2 days of dysphagia and 1 one day of tongue/left jaw swelling. Patient admitted for dysphagia w/u

## 2021-04-21 ENCOUNTER — APPOINTMENT (OUTPATIENT)
Dept: LAB | Facility: HOSPITAL | Age: 86
End: 2021-04-21
Attending: INTERNAL MEDICINE
Payer: MEDICARE

## 2021-04-21 DIAGNOSIS — R80.8 OTHER PROTEINURIA: ICD-10-CM

## 2021-04-21 DIAGNOSIS — N18.30 HYPERTENSIVE KIDNEY DISEASE WITH STAGE 3 CHRONIC KIDNEY DISEASE, UNSPECIFIED WHETHER STAGE 3A OR 3B CKD (HCC): ICD-10-CM

## 2021-04-21 DIAGNOSIS — N18.30 STAGE 3 CHRONIC KIDNEY DISEASE, UNSPECIFIED WHETHER STAGE 3A OR 3B CKD (HCC): ICD-10-CM

## 2021-04-21 DIAGNOSIS — E55.9 VITAMIN D DEFICIENCY: ICD-10-CM

## 2021-04-21 DIAGNOSIS — I12.9 HYPERTENSIVE KIDNEY DISEASE WITH STAGE 3 CHRONIC KIDNEY DISEASE, UNSPECIFIED WHETHER STAGE 3A OR 3B CKD (HCC): ICD-10-CM

## 2021-04-21 LAB
25(OH)D3 SERPL-MCNC: 48.1 NG/ML (ref 30–100)
ANION GAP SERPL CALCULATED.3IONS-SCNC: 8 MMOL/L (ref 4–13)
BACTERIA UR QL AUTO: ABNORMAL /HPF
BASOPHILS # BLD AUTO: 0.05 THOUSANDS/ΜL (ref 0–0.1)
BASOPHILS NFR BLD AUTO: 1 % (ref 0–1)
BILIRUB UR QL STRIP: NEGATIVE
BUN SERPL-MCNC: 37 MG/DL (ref 5–25)
CALCIUM SERPL-MCNC: 8.8 MG/DL (ref 8.3–10.1)
CHLORIDE SERPL-SCNC: 104 MMOL/L (ref 100–108)
CLARITY UR: CLEAR
CO2 SERPL-SCNC: 26 MMOL/L (ref 21–32)
COLOR UR: YELLOW
CREAT SERPL-MCNC: 2.12 MG/DL (ref 0.6–1.3)
CREAT UR-MCNC: 182 MG/DL
EOSINOPHIL # BLD AUTO: 0.35 THOUSAND/ΜL (ref 0–0.61)
EOSINOPHIL NFR BLD AUTO: 5 % (ref 0–6)
ERYTHROCYTE [DISTWIDTH] IN BLOOD BY AUTOMATED COUNT: 13.1 % (ref 11.6–15.1)
GFR SERPL CREATININE-BSD FRML MDRD: 28 ML/MIN/1.73SQ M
GLUCOSE P FAST SERPL-MCNC: 186 MG/DL (ref 65–99)
GLUCOSE UR STRIP-MCNC: NEGATIVE MG/DL
HCT VFR BLD AUTO: 37.9 % (ref 36.5–49.3)
HGB BLD-MCNC: 12.1 G/DL (ref 12–17)
HGB UR QL STRIP.AUTO: NEGATIVE
IMM GRANULOCYTES # BLD AUTO: 0.02 THOUSAND/UL (ref 0–0.2)
IMM GRANULOCYTES NFR BLD AUTO: 0 % (ref 0–2)
KETONES UR STRIP-MCNC: NEGATIVE MG/DL
LEUKOCYTE ESTERASE UR QL STRIP: NEGATIVE
LYMPHOCYTES # BLD AUTO: 1.81 THOUSANDS/ΜL (ref 0.6–4.47)
LYMPHOCYTES NFR BLD AUTO: 28 % (ref 14–44)
MCH RBC QN AUTO: 30.7 PG (ref 26.8–34.3)
MCHC RBC AUTO-ENTMCNC: 31.9 G/DL (ref 31.4–37.4)
MCV RBC AUTO: 96 FL (ref 82–98)
MICROALBUMIN UR-MCNC: 253 MG/L (ref 0–20)
MICROALBUMIN/CREAT 24H UR: 139 MG/G CREATININE (ref 0–30)
MONOCYTES # BLD AUTO: 0.48 THOUSAND/ΜL (ref 0.17–1.22)
MONOCYTES NFR BLD AUTO: 7 % (ref 4–12)
MUCOUS THREADS UR QL AUTO: ABNORMAL
NEUTROPHILS # BLD AUTO: 3.88 THOUSANDS/ΜL (ref 1.85–7.62)
NEUTS SEG NFR BLD AUTO: 59 % (ref 43–75)
NITRITE UR QL STRIP: NEGATIVE
NON-SQ EPI CELLS URNS QL MICRO: ABNORMAL /HPF
NRBC BLD AUTO-RTO: 0 /100 WBCS
PH UR STRIP.AUTO: 5.5 [PH]
PHOSPHATE SERPL-MCNC: 3.6 MG/DL (ref 2.3–4.1)
PLATELET # BLD AUTO: 166 THOUSANDS/UL (ref 149–390)
PMV BLD AUTO: 10.1 FL (ref 8.9–12.7)
POTASSIUM SERPL-SCNC: 4.9 MMOL/L (ref 3.5–5.3)
PROT UR STRIP-MCNC: ABNORMAL MG/DL
PTH-INTACT SERPL-MCNC: 69.1 PG/ML (ref 18.4–80.1)
RBC # BLD AUTO: 3.94 MILLION/UL (ref 3.88–5.62)
RBC #/AREA URNS AUTO: ABNORMAL /HPF
SODIUM SERPL-SCNC: 138 MMOL/L (ref 136–145)
SP GR UR STRIP.AUTO: 1.02 (ref 1–1.03)
URATE SERPL-MCNC: 7.2 MG/DL (ref 4.2–8)
UROBILINOGEN UR QL STRIP.AUTO: 0.2 E.U./DL
WBC # BLD AUTO: 6.59 THOUSAND/UL (ref 4.31–10.16)
WBC #/AREA URNS AUTO: ABNORMAL /HPF

## 2021-04-21 PROCEDURE — 84550 ASSAY OF BLOOD/URIC ACID: CPT

## 2021-04-21 PROCEDURE — 82043 UR ALBUMIN QUANTITATIVE: CPT

## 2021-04-21 PROCEDURE — 84100 ASSAY OF PHOSPHORUS: CPT

## 2021-04-21 PROCEDURE — 81001 URINALYSIS AUTO W/SCOPE: CPT

## 2021-04-21 PROCEDURE — 80048 BASIC METABOLIC PNL TOTAL CA: CPT

## 2021-04-21 PROCEDURE — 82570 ASSAY OF URINE CREATININE: CPT

## 2021-04-21 PROCEDURE — 85025 COMPLETE CBC W/AUTO DIFF WBC: CPT

## 2021-04-21 PROCEDURE — 83970 ASSAY OF PARATHORMONE: CPT

## 2021-04-21 PROCEDURE — 82306 VITAMIN D 25 HYDROXY: CPT

## 2021-04-21 PROCEDURE — 36415 COLL VENOUS BLD VENIPUNCTURE: CPT

## 2021-04-29 ENCOUNTER — OFFICE VISIT (OUTPATIENT)
Dept: NEPHROLOGY | Facility: CLINIC | Age: 86
End: 2021-04-29
Payer: MEDICARE

## 2021-04-29 VITALS
DIASTOLIC BLOOD PRESSURE: 90 MMHG | RESPIRATION RATE: 16 BRPM | TEMPERATURE: 97.4 F | WEIGHT: 216.8 LBS | HEIGHT: 69 IN | HEART RATE: 72 BPM | BODY MASS INDEX: 32.11 KG/M2 | SYSTOLIC BLOOD PRESSURE: 132 MMHG

## 2021-04-29 DIAGNOSIS — N18.4 HYPERTENSIVE KIDNEY DISEASE WITH STAGE 4 CHRONIC KIDNEY DISEASE (HCC): ICD-10-CM

## 2021-04-29 DIAGNOSIS — N18.4 STAGE 4 CHRONIC KIDNEY DISEASE (HCC): Primary | ICD-10-CM

## 2021-04-29 DIAGNOSIS — I12.9 HYPERTENSIVE KIDNEY DISEASE WITH STAGE 4 CHRONIC KIDNEY DISEASE (HCC): ICD-10-CM

## 2021-04-29 DIAGNOSIS — E55.9 VITAMIN D DEFICIENCY: Chronic | ICD-10-CM

## 2021-04-29 DIAGNOSIS — R80.8 OTHER PROTEINURIA: Chronic | ICD-10-CM

## 2021-04-29 PROCEDURE — 99214 OFFICE O/P EST MOD 30 MIN: CPT | Performed by: INTERNAL MEDICINE

## 2021-04-29 NOTE — PROGRESS NOTES
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  Patient's daughter-Barrie was also present at the time of consultation and history was also obtained from her and all the questions were answered  Upon review of old medical records patient's new baseline creatinine seems to be fluctuating around 2 1-2 2    Patient has underlying coronary artery disease and had underwent cardiac catheterization on 1/30/2020 and had mid and distal LAD stent placed  Patient complains of worsening shortness of breath upon ambulation and has schedule appointment with cardiologist next week   Clinically patient is not in volume overload state   Patient has hypertension and has been compliant with his antihypertensive medications  According to patient he has been checking blood pressure on regular basis at home and his blood pressure at home is under well control  Patient had underwent renal artery Doppler in March 2020 which also showed > 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 and has refused to see vascular surgeon in the past   Patient does not have any abdominal symptoms either  Patient has underlying diabetes type 2 and according to patient's daughter, blood glucose level lately running around 300 range  Advised patient's daughter to contact PCP for further evaluation   Patient was also previously found to have proteinuria which was suspected due to underlying diabetic nephropathy on top of hypertensive nephrosclerosis  Patient takes lisinopril      Patient was also found to have vitamin-D deficiency and currently taking cholecalciferol 1000 Units PO daily  Patient takes all the medications as prescribed and denies use of NSAIDs  REVIEW OF SYSTEMS:    Review of Systems   Constitutional: Negative for chills and fever  HENT: Negative for nosebleeds and sore throat  Eyes: Negative for photophobia and pain  Respiratory: Negative for choking and wheezing  Cardiovascular: Negative for chest pain and palpitations  Gastrointestinal: Negative for abdominal pain and blood in stool  Endocrine: Negative for heat intolerance  Genitourinary: Positive for flank pain  Negative for hematuria  Musculoskeletal: Negative for joint swelling and neck pain  Skin: Negative for color change and pallor  Neurological: Negative for seizures and syncope  Psychiatric/Behavioral: Negative for confusion and suicidal ideas           PAST MEDICAL HISTORY:  Past Medical History:   Diagnosis Date    Acute deep vein thrombosis (DVT) of brachial vein of left upper extremity ([facility] ) 11/24/2017    Acute deep vein thrombosis (DVT) of left peroneal vein ([facility])     Acute ST elevation myocardial infarction ([facility] )     Aortic valve stenosis     Atrial fibrillation ([facility])     Basilar artery stenosis     Basilar artery stenosis     Benign prostatic hyperplasia with lower urinary tract symptoms     CAD (coronary artery disease)     Chronic kidney disease     Closed fracture of multiple ribs of left side with routine healing 9/3/2020    DM2 (diabetes mellitus, type 2) ([facility] )     History of transfusion     HLD (hyperlipidemia)     HTN (hypertension)     Middle cerebral artery stenosis     Proteinuria     Symptomatic bradycardia     Transient cerebral ischemia     Vitamin D deficiency        PAST SURGICAL HISTORY:  Past Surgical History:   Procedure Laterality Date    CARDIAC CATHETERIZATION      Outcome: successful; last assessed: 02/03/2015    CARDIAC CATHETERIZATION  11/26/2019    CORONARY ANGIOPLASTY WITH STENT PLACEMENT  2008    stent to LAD     HAND SURGERY      thumb    HIP HARDWARE REMOVAL      TOTAL HIP ARTHROPLASTY Right     TOTAL HIP ARTHROPLASTY Bilateral        SOCIAL HISTORY:  Social History     Substance and Sexual Activity   Alcohol Use Never    Frequency: Never    Binge frequency: Never     Social History     Substance and Sexual Activity   Drug Use No     Social History     Tobacco Use   Smoking Status Never Smoker   Smokeless Tobacco Never Used       FAMILY HISTORY:  Family History   Problem Relation Age of Onset    Emphysema Father     Emphysema Sister        ALLERGY:  Allergies   Allergen Reactions    Celecoxib Other (See Comments)     Per pt does NOT remember type of reaction or severity level    Hydromorphone Other (See Comments)     Per pt does NOT remember type of reaction or severity level    Pravastatin Hives    Statins Other (See Comments)     Per pt does NOT remember type of reaction or severity level       MEDICATIONS:    Current Outpatient Medications:     amLODIPine (NORVASC) 10 mg tablet, TAKE 1 TABLET BY MOUTH  DAILY, Disp: 90 tablet, Rfl: 3    apixaban (ELIQUIS) 2 5 mg, Take 1 tablet (2 5 mg total) by mouth 2 (two) times a day, Disp: 180 tablet, Rfl: 3    Blood Glucose Monitoring Suppl (ONE TOUCH ULTRA MINI) w/Device KIT, by Does not apply route, Disp: , Rfl:     cholecalciferol (VITAMIN D3) 1,000 units tablet, Take 1,000 Units by mouth daily, Disp: , Rfl:     clopidogrel (PLAVIX) 75 mg tablet, Take 1 tablet (75 mg total) by mouth daily, Disp: 90 tablet, Rfl: 3    doxazosin (CARDURA) 8 MG tablet, TAKE 1 TABLET BY MOUTH  DAILY, Disp: 90 tablet, Rfl: 2    gabapentin (NEURONTIN) 100 mg capsule, Take 1 capsule (100 mg total) by mouth 2 (two) times a day, Disp: 180 capsule, Rfl: 3    glucose blood (ONE TOUCH ULTRA TEST) test strip, Test once daily, Disp: 100 each, Rfl: 5    isosorbide dinitrate (ISORDIL) 10 mg tablet, Take 1 tablet (10 mg total) by mouth 2 (two) times a day, Disp: 180 tablet, Rfl: 3    lisinopril (ZESTRIL) 40 mg tablet, Take 1 tablet (40 mg total) by mouth daily, Disp: 90 tablet, Rfl: 3    metoprolol tartrate (LOPRESSOR) 25 mg tablet, Take 1 tablet (25 mg total) by mouth every 12 (twelve) hours, Disp: 180 tablet, Rfl: 3    nitroglycerin (NITROSTAT) 0 4 mg SL tablet, Place 1 tablet (0 4 mg total) under the tongue every 5 (five) minutes as needed for chest pain, Disp: 25 tablet, Rfl: 4    omega-3-acid ethyl esters (LOVAZA) 1 g capsule, Take 1,200 mg by mouth daily, Disp: , Rfl:     pyridoxine (RA VITAMIN B-6) 100 MG tablet, Take 1 tablet by mouth daily, Disp: , Rfl:     vitamin B-12 (CYANOCOBALAMIN) 100 MCG tablet, Take 1,000 mcg by mouth daily, Disp: , Rfl:     PHYSICAL EXAM:  Vitals:    04/29/21 1101   BP: 132/90   BP Location: Left arm   Patient Position: Sitting   Cuff Size: Large   Pulse: 72   Resp: 16   Temp: (!) 97 4 °F (36 3 °C)   TempSrc: Temporal   Weight: 98 3 kg (216 lb 12 8 oz)   Height: 5' 9" (1 753 m)     Body mass index is 32 02 kg/m²  Physical Exam  Constitutional:       General: He is not in acute distress  HENT:      Head: Normocephalic and atraumatic  Eyes:      General: No scleral icterus  Neck:      Musculoskeletal: Neck supple  Vascular: No JVD  Cardiovascular:      Rate and Rhythm: Normal rate  Heart sounds: S1 normal and S2 normal  Murmur present  Pulmonary:      Effort: Pulmonary effort is normal  No accessory muscle usage or respiratory distress  Breath sounds: No wheezing  Abdominal:      General: There is no distension  Palpations: Abdomen is soft  Comments:   Bilateral CVA tenderness upon touch   Around 2 cm epigastric nodule which is  Soft and movable upon touch and nontender -most likely lipoma   Musculoskeletal:      Right ankle: He exhibits no swelling  Left ankle: He exhibits no swelling        Comments: Moving all extremities   Skin:     General: Skin is warm  Comments: No jaundice    Neurological:      Mental Status: He is alert  He is not disoriented  Comments: Facial symmetry  Speech is clear   Psychiatric:         Speech: He is communicative  Behavior: Behavior is not combative           LAB RESULTS:  Results for orders placed or performed in visit on 04/21/21   CBC and differential   Result Value Ref Range    WBC 6 59 4 31 - 10 16 Thousand/uL    RBC 3 94 3 88 - 5 62 Million/uL    Hemoglobin 12 1 12 0 - 17 0 g/dL    Hematocrit 37 9 36 5 - 49 3 %    MCV 96 82 - 98 fL    MCH 30 7 26 8 - 34 3 pg    MCHC 31 9 31 4 - 37 4 g/dL    RDW 13 1 11 6 - 15 1 %    MPV 10 1 8 9 - 12 7 fL    Platelets 750 014 - 415 Thousands/uL    nRBC 0 /100 WBCs    Neutrophils Relative 59 43 - 75 %    Immat GRANS % 0 0 - 2 %    Lymphocytes Relative 28 14 - 44 %    Monocytes Relative 7 4 - 12 %    Eosinophils Relative 5 0 - 6 %    Basophils Relative 1 0 - 1 %    Neutrophils Absolute 3 88 1 85 - 7 62 Thousands/µL    Immature Grans Absolute 0 02 0 00 - 0 20 Thousand/uL    Lymphocytes Absolute 1 81 0 60 - 4 47 Thousands/µL    Monocytes Absolute 0 48 0 17 - 1 22 Thousand/µL    Eosinophils Absolute 0 35 0 00 - 0 61 Thousand/µL    Basophils Absolute 0 05 0 00 - 0 10 Thousands/µL   Basic metabolic panel   Result Value Ref Range    Sodium 138 136 - 145 mmol/L    Potassium 4 9 3 5 - 5 3 mmol/L    Chloride 104 100 - 108 mmol/L    CO2 26 21 - 32 mmol/L    ANION GAP 8 4 - 13 mmol/L    BUN 37 (H) 5 - 25 mg/dL    Creatinine 2 12 (H) 0 60 - 1 30 mg/dL    Glucose, Fasting 186 (H) 65 - 99 mg/dL    Calcium 8 8 8 3 - 10 1 mg/dL    eGFR 28 ml/min/1 73sq m   Urinalysis with microscopic   Result Value Ref Range    Clarity, UA Clear     Color, UA Yellow     Specific [place], UA 1 020 1 003 - 1 030    pH, UA 5 5 4 5, 5 0, 5 5, 6 0, 6 5, 7 0, 7 5, 8 0    Glucose, UA Negative Negative mg/dl    Ketones, UA Negative Negative mg/dl    Blood, UA Negative Negative    Protein, UA 30 (1+) (A) Negative mg/dl    Nitrite, UA Negative Negative    Bilirubin, UA Negative Negative    Urobilinogen, UA 0 2 0 2, 1 0 E U /dl E U /dl    Leukocytes, UA Negative Negative    WBC, UA 0-1 (A) None Seen, 2-4 /hpf    RBC, UA None Seen None Seen, 2-4 /hpf    Bacteria, UA Occasional None Seen, Occasional /hpf    Epithelial Cells Occasional None Seen, Occasional /hpf    MUCUS THREADS Occasional (A) None Seen   Microalbumin / creatinine urine ratio   Result Value Ref Range    Creatinine, Ur 182 0 mg/dL    Microalbum  ,U,Random 253 0 (H) 0 0 - 20 0 mg/L    Microalb Creat Ratio 139 (H) 0 - 30 mg/g creatinine   Phosphorus   Result Value Ref Range    Phosphorus 3 6 2 3 - 4 1 mg/dL   Uric acid   Result Value Ref Range    Uric Acid 7 2 4 2 - 8 0 mg/dL   PTH, intact   Result Value Ref Range    PTH 69 1 18 4 - 80 1 pg/mL   Vitamin D 25 hydroxy   Result Value Ref Range    Vit D, 25-Hydroxy 48 1 30 0 - 100 0 ng/mL       ASSESSMENT and PLAN:  Angie Arce was seen today for chronic kidney disease and follow-up  Diagnoses and all orders for this visit:    Stage 4 chronic kidney disease ([facility] [address] )  -     CBC and differential; Future  -     Basic metabolic panel; Future  -     Urinalysis with microscopic; Future  -     Microalbumin / creatinine urine ratio; Future  -     Phosphorus; Future  -     Uric acid; Future  -     PTH, intact; Future  -     Vitamin D 25 hydroxy; Future  -     Ambulatory referral to ckd education program; Future    Hypertensive kidney disease with stage 4 chronic kidney disease (HCC)  -     Basic metabolic panel; Future  -     Urinalysis with microscopic; Future  -     Microalbumin / creatinine urine ratio; Future    Other proteinuria  -     Urinalysis with microscopic; Future  -     Microalbumin / creatinine urine ratio; Future    Vitamin D deficiency  -     Vitamin D 25 hydroxy; Future      1  CKD stage 3-4    Multifactorial and suspected due to underlying diabetic nephropathy on top of hypertensive nephrosclerosis plus advanced age     Upon review of old medical records, patient's new baseline creatinine seems to be fluctuating around  2 1-2 2 with current creatinine of 2 12 with EGFR of 28  Dialysis discussed during today's visit and will plan to refer patient to [facility] class today and go over patient's preference with the next visit   Management of diabetes and hypertension as mentioned below  Patient has bilateral CVA tenderness which is reproducible upon palpation which seems to be muscular skeleton in nature  Patient is using ice pack which is also improving tenderness   According to patient he has received epidural injection in the past which has also improved the pain   Advised patient to avoid NSAIDs and contact pain management specialist for further evaluation   Plan to avoid NSAIDs as and recheck renal function before next visit  2  Hypertension in chronic kidney disease  Today's blood pressure was under well control and for time being plan to monitor hypertension with metoprolol 25 mg PO daily, doxazosin 8 mg PO daily, isosorbide 10 mg PO BID, amlodipine 10 mg PO daily and lisinopril 40 mg PO daily today  Advised to follow low-salt diet  Patient had underwent renal artery Doppler on March 2020 and it was difficult to visualize renal artery due to bowel gas pattern  Patient was also found to have diffuse disease in left renal artery but patent  Patient was also found to have > 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 and has refused to see vascular surgeon in the past     3  Diabetes type 2 in chronic kidney disease  Goal Hb A1c would be < 7% for underlying chronic kidney disease  Continue to avoid metformin  Currently patient is not taking any antidiabetic medication  Defer further management of diabetes to PCP  4  Proteinuria  Multifactorial, current urine microalbumin to creatinine ratio is 139 mg  plan to continue lisinopril 40 mg PO daily     Management of diabetes and hypertension as mentioned earlier  Recheck proteinuria with the next visit  5  Vitamin-D deficiency  Currently taking cholecalciferol 1000 Units PO daily  Current vitamin-D level is 48  Calcium level is also at goal   Plan to continue cholecalciferol at current dose and recheck vitamin- D level with the next visit   Returns to Nephrology office in 4 months  Future labs ordered  In the interim patient will also attend [facility] class   Labs ( done on 11/17/2021 )   Creatinine 1 94 with EGFR of 31 -> follow-up on [facility] Smart class  Hemoglobin 12  7  hemoglobin A1c 6 4%   Urine analysis showed no dysuria  Urine microalbumin : creatinine ratio of 795 mg  Normal vitamin- D ( 35 ), PTH ( 45 ), uric acid ( 7 0 ), sodium, potassium, bicarb, calcium and phosphorus   Portions of the record may have been created with voice recognition software  Occasional wrong word or "sound a like" substitutions may have occurred due to the inherent limitations of voice recognition software  Read the chart carefully and recognize, using context, where substitutions have occurred  If you have any questions, please contact the dictating provider

## 2021-04-29 NOTE — LETTER
April 29, 2021     Aniyah Humble DO  [address] [place] [id number]    Patient: Eli Isabel   YOB: 1935   Date of Visit: 4/29/2021       Dear Dr Herson Quinteros:    Thank you for referring Karla Miramontes to me for evaluation  Below are my notes for this consultation  If you have questions, please do not hesitate to call me  I look forward to following your patient along with you  Sincerely,        Radha Desir MD        CC: No Recipients  Radha Desir MD  4/29/2021 11:53 AM  Sign when Signing Visit  NEPHROLOGY OFFICE FOLLOW UP  Eli Isabel 82 y o  male Date of Birth: @ MRN: [id number]    Encounter: [id number] DATE: 4/29/2021    REASON FOR VISIT: Eli Isabel is a 82 y o  male returns to Nephrology office on 4/29/2021 for further management of chronic kidney disease  HPI:    This is a 68-year-old male with a past medical history of diabetes type 2, hypertension, coronary artery disease, returns to Nephrology office for 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  Patient's daughter-Barrie was also present at the time of consultation and history was also obtained from her and all the questions were answered  Upon review of old medical records patient's new baseline creatinine seems to be fluctuating around 2 1-2 2    Patient has underlying coronary artery disease and had underwent cardiac catheterization on 1/30/2020 and had mid and distal LAD stent placed  Patient complains of worsening shortness of breath upon ambulation and has schedule appointment with cardiologist next week   Clinically patient is not in volume overload state   Patient has hypertension and has been compliant with his antihypertensive medications  According to patient he has been checking blood pressure on regular basis at home and his blood pressure at home is under well control    Patient had underwent renal artery Doppler in March 2020 which also showed > 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 and has refused to see vascular surgeon in the past   Patient does not have any abdominal symptoms either  Patient has underlying diabetes type 2 and according to patient's daughter, blood glucose level lately running around 300 range  Advised patient's daughter to contact PCP for further evaluation   Patient was also previously found to have proteinuria which was suspected due to underlying diabetic nephropathy on top of hypertensive nephrosclerosis  Patient takes lisinopril  Patient was also found to have vitamin-D deficiency and currently taking cholecalciferol 1000 Units PO daily  Patient takes all the medications as prescribed and denies use of NSAIDs  REVIEW OF SYSTEMS:    Review of Systems   Constitutional: Negative for chills and fever  HENT: Negative for nosebleeds and sore throat  Eyes: Negative for photophobia and pain  Respiratory: Negative for choking and wheezing  Cardiovascular: Negative for chest pain and palpitations  Gastrointestinal: Negative for abdominal pain and blood in stool  Endocrine: Negative for heat intolerance  Genitourinary: Positive for flank pain  Negative for hematuria  Musculoskeletal: Negative for joint swelling and neck pain  Skin: Negative for color change and pallor  Neurological: Negative for seizures and syncope  Psychiatric/Behavioral: Negative for confusion and suicidal ideas           PAST MEDICAL HISTORY:  Past Medical History:   Diagnosis Date    Acute deep vein thrombosis (DVT) of brachial vein of left upper extremity ([facility] [address] ) 11/24/2017    Acute deep vein thrombosis (DVT) of left peroneal vein (HCC)     Acute ST elevation myocardial infarction [facility])     Aortic valve stenosis     Atrial fibrillation (HCC)     Basilar artery stenosis     Basilar artery stenosis     Benign prostatic hyperplasia with lower urinary tract symptoms     CAD (coronary artery disease)     Chronic kidney disease  Closed fracture of multiple ribs of left side with routine healing 9/3/2020    DM2 (diabetes mellitus, type 2) ([facility] [address] )     History of transfusion     HLD (hyperlipidemia)     HTN (hypertension)     Middle cerebral artery stenosis     Proteinuria     Symptomatic bradycardia     Transient cerebral ischemia     Vitamin D deficiency        PAST SURGICAL HISTORY:  Past Surgical History:   Procedure Laterality Date    CARDIAC CATHETERIZATION      Outcome: successful; last assessed: 02/03/2015    CARDIAC CATHETERIZATION  11/26/2019    CORONARY ANGIOPLASTY WITH STENT PLACEMENT  2008    stent to LAD     HAND SURGERY      thumb    HIP HARDWARE REMOVAL      TOTAL HIP ARTHROPLASTY Right     TOTAL HIP ARTHROPLASTY Bilateral        SOCIAL HISTORY:  Social History     Substance and Sexual Activity   Alcohol Use Never    Frequency: Never    Binge frequency: Never     Social History     Substance and Sexual Activity   Drug Use No     Social History     Tobacco Use   Smoking Status Never Smoker   Smokeless Tobacco Never Used       FAMILY HISTORY:  Family History   Problem Relation Age of Onset    Emphysema Father     Emphysema Sister        ALLERGY:  Allergies   Allergen Reactions    Celecoxib Other (See Comments)     Per pt does NOT remember type of reaction or severity level    Hydromorphone Other (See Comments)     Per pt does NOT remember type of reaction or severity level    Pravastatin Hives    Statins Other (See Comments)     Per pt does NOT remember type of reaction or severity level       MEDICATIONS:    Current Outpatient Medications:     amLODIPine (NORVASC) 10 mg tablet, TAKE 1 TABLET BY MOUTH  DAILY, Disp: 90 tablet, Rfl: 3    apixaban (ELIQUIS) 2 5 mg, Take 1 tablet (2 5 mg total) by mouth 2 (two) times a day, Disp: 180 tablet, Rfl: 3    Blood Glucose Monitoring Suppl (ONE TOUCH ULTRA MINI) w/Device KIT, by Does not apply route, Disp: , Rfl:     cholecalciferol (VITAMIN D3) 1,000 units tablet, Take 1,000 Units by mouth daily, Disp: , Rfl:     clopidogrel (PLAVIX) 75 mg tablet, Take 1 tablet (75 mg total) by mouth daily, Disp: 90 tablet, Rfl: 3    doxazosin (CARDURA) 8 MG tablet, TAKE 1 TABLET BY MOUTH  DAILY, Disp: 90 tablet, Rfl: 2    gabapentin (NEURONTIN) 100 mg capsule, Take 1 capsule (100 mg total) by mouth 2 (two) times a day, Disp: 180 capsule, Rfl: 3    glucose blood (ONE TOUCH ULTRA TEST) test strip, Test once daily, Disp: 100 each, Rfl: 5    isosorbide dinitrate (ISORDIL) 10 mg tablet, Take 1 tablet (10 mg total) by mouth 2 (two) times a day, Disp: 180 tablet, Rfl: 3    lisinopril (ZESTRIL) 40 mg tablet, Take 1 tablet (40 mg total) by mouth daily, Disp: 90 tablet, Rfl: 3    metoprolol tartrate (LOPRESSOR) 25 mg tablet, Take 1 tablet (25 mg total) by mouth every 12 (twelve) hours, Disp: 180 tablet, Rfl: 3    nitroglycerin (NITROSTAT) 0 4 mg SL tablet, Place 1 tablet (0 4 mg total) under the tongue every 5 (five) minutes as needed for chest pain, Disp: 25 tablet, Rfl: 4    omega-3-acid ethyl esters (LOVAZA) 1 g capsule, Take 1,200 mg by mouth daily, Disp: , Rfl:     pyridoxine (RA VITAMIN B-6) 100 MG tablet, Take 1 tablet by mouth daily, Disp: , Rfl:     vitamin B-12 (CYANOCOBALAMIN) 100 MCG tablet, Take 1,000 mcg by mouth daily, Disp: , Rfl:     PHYSICAL EXAM:  Vitals:    04/29/21 1101   BP: 132/90   BP Location: Left arm   Patient Position: Sitting   Cuff Size: Large   Pulse: 72   Resp: 16   Temp: (!) 97 4 °F (36 3 °C)   TempSrc: Temporal   Weight: 98 3 kg (216 lb 12 8 oz)   Height: 5' 9" (1 753 m)     Body mass index is 32 02 kg/m²  Physical Exam  Constitutional:       General: He is not in acute distress  HENT:      Head: Normocephalic and atraumatic  Eyes:      General: No scleral icterus  Neck:      Musculoskeletal: Neck supple  Vascular: No JVD  Cardiovascular:      Rate and Rhythm: Normal rate  Heart sounds: S1 normal and S2 normal  Murmur present  Pulmonary:      Effort: Pulmonary effort is normal  No accessory muscle usage or respiratory distress  Breath sounds: No wheezing  Abdominal:      General: There is no distension  Palpations: Abdomen is soft  Comments:   Bilateral CVA tenderness upon touch   Around 2 cm epigastric nodule which is  Soft and movable upon touch and nontender -most likely lipoma   Musculoskeletal:      Right ankle: He exhibits no swelling  Left ankle: He exhibits no swelling  Comments: Moving all extremities   Skin:     General: Skin is warm  Comments: No jaundice    Neurological:      Mental Status: He is alert  He is not disoriented  Comments: Facial symmetry  Speech is clear   Psychiatric:         Speech: He is communicative  Behavior: Behavior is not combative           LAB RESULTS:  Results for orders placed or performed in visit on 04/21/21   CBC and differential   Result Value Ref Range    WBC 6 59 4 31 - 10 16 Thousand/uL    RBC 3 94 3 88 - 5 62 Million/uL    Hemoglobin 12 1 12 0 - 17 0 g/dL    Hematocrit 37 9 36 5 - 49 3 %    MCV 96 82 - 98 fL    MCH 30 7 26 8 - 34 3 pg    MCHC 31 9 31 4 - 37 4 g/dL    RDW 13 1 11 6 - 15 1 %    MPV 10 1 8 9 - 12 7 fL    Platelets 339 879 - 727 Thousands/uL    nRBC 0 /100 WBCs    Neutrophils Relative 59 43 - 75 %    Immat GRANS % 0 0 - 2 %    Lymphocytes Relative 28 14 - 44 %    Monocytes Relative 7 4 - 12 %    Eosinophils Relative 5 0 - 6 %    Basophils Relative 1 0 - 1 %    Neutrophils Absolute 3 88 1 85 - 7 62 Thousands/µL    Immature Grans Absolute 0 02 0 00 - 0 20 Thousand/uL    Lymphocytes Absolute 1 81 0 60 - 4 47 Thousands/µL    Monocytes Absolute 0 48 0 17 - 1 22 Thousand/µL    Eosinophils Absolute 0 35 0 00 - 0 61 Thousand/µL    Basophils Absolute 0 05 0 00 - 0 10 Thousands/µL   Basic metabolic panel   Result Value Ref Range    Sodium 138 136 - 145 mmol/L    Potassium 4 9 3 5 - 5 3 mmol/L    Chloride 104 100 - 108 mmol/L    CO2 26 21 - 32 mmol/L    ANION GAP 8 4 - 13 mmol/L    BUN 37 (H) 5 - 25 mg/dL    Creatinine 2 12 (H) 0 60 - 1 30 mg/dL    Glucose, Fasting 186 (H) 65 - 99 mg/dL    Calcium 8 8 8 3 - 10 1 mg/dL    eGFR 28 ml/min/1 73sq m   Urinalysis with microscopic   Result Value Ref Range    Clarity, UA Clear     Color, UA Yellow     Specific [place], UA 1 020 1 003 - 1 030    pH, UA 5 5 4 5, 5 0, 5 5, 6 0, 6 5, 7 0, 7 5, 8 0    Glucose, UA Negative Negative mg/dl    Ketones, UA Negative Negative mg/dl    Blood, UA Negative Negative    Protein, UA 30 (1+) (A) Negative mg/dl    Nitrite, UA Negative Negative    Bilirubin, UA Negative Negative    Urobilinogen, UA 0 2 0 2, 1 0 E U /dl E U /dl    Leukocytes, UA Negative Negative    WBC, UA 0-1 (A) None Seen, 2-4 /hpf    RBC, UA None Seen None Seen, 2-4 /hpf    Bacteria, UA Occasional None Seen, Occasional /hpf    Epithelial Cells Occasional None Seen, Occasional /hpf    MUCUS THREADS Occasional (A) None Seen   Microalbumin / creatinine urine ratio   Result Value Ref Range    Creatinine, Ur 182 0 mg/dL    Microalbum  ,U,Random 253 0 (H) 0 0 - 20 0 mg/L    Microalb Creat Ratio 139 (H) 0 - 30 mg/g creatinine   Phosphorus   Result Value Ref Range    Phosphorus 3 6 2 3 - 4 1 mg/dL   Uric acid   Result Value Ref Range    Uric Acid 7 2 4 2 - 8 0 mg/dL   PTH, intact   Result Value Ref Range    PTH 69 1 18 4 - 80 1 pg/mL   Vitamin D 25 hydroxy   Result Value Ref Range    Vit D, 25-Hydroxy 48 1 30 0 - 100 0 ng/mL       ASSESSMENT and PLAN:  Mabelene Alpers was seen today for chronic kidney disease and follow-up  Diagnoses and all orders for this visit:    Stage 4 chronic kidney disease ([address] )  -     CBC and differential; Future  -     Basic metabolic panel; Future  -     Urinalysis with microscopic; Future  -     Microalbumin / creatinine urine ratio; Future  -     Phosphorus; Future  -     Uric acid; Future  -     PTH, intact; Future  -     Vitamin D 25 hydroxy;  Future  -     Ambulatory referral to ckd education program; Future    Hypertensive kidney disease with stage 4 chronic kidney disease (HCC)  -     Basic metabolic panel; Future  -     Urinalysis with microscopic; Future  -     Microalbumin / creatinine urine ratio; Future    Other proteinuria  -     Urinalysis with microscopic; Future  -     Microalbumin / creatinine urine ratio; Future    Vitamin D deficiency  -     Vitamin D 25 hydroxy; Future      1  CKD stage 3-4  Multifactorial and suspected due to underlying diabetic nephropathy on top of hypertensive nephrosclerosis plus advanced age  Upon review of old medical records, patient's new baseline creatinine seems to be fluctuating around  2 1-2 2 with current creatinine of 2 12 with EGFR of 28  Dialysis discussed during today's visit and will plan to refer patient to [facility] Smart class today and go over patient's preference with the next visit   Management of diabetes and hypertension as mentioned below  Patient has bilateral CVA tenderness which is reproducible upon palpation which seems to be muscular skeleton in nature  Patient is using ice pack which is also improving tenderness   According to patient he has received epidural injection in the past which has also improved the pain   Advised patient to avoid NSAIDs and contact pain management specialist for further evaluation   Plan to avoid NSAIDs as and recheck renal function before next visit  2  Hypertension in chronic kidney disease  Today's blood pressure was under well control and for time being plan to monitor hypertension with metoprolol 25 mg PO daily, doxazosin 8 mg PO daily, isosorbide 10 mg PO BID, amlodipine 10 mg PO daily and lisinopril 40 mg PO daily today  Advised to follow low-salt diet  Patient had underwent renal artery Doppler on March 2020 and it was difficult to visualize renal artery due to bowel gas pattern  Patient was also found to have diffuse disease in left renal artery but patent    Patient was also found to have > 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 and has refused to see vascular surgeon in the past     3  Diabetes type 2 in chronic kidney disease  Goal Hb A1c would be < 7% for underlying chronic kidney disease  Continue to avoid metformin  Currently patient is not taking any antidiabetic medication  Defer further management of diabetes to PCP  4  Proteinuria  Multifactorial, current urine microalbumin to creatinine ratio is 139 mg  plan to continue lisinopril 40 mg PO daily     Management of diabetes and hypertension as mentioned earlier  Recheck proteinuria with the next visit  5  Vitamin-D deficiency  Currently taking cholecalciferol 1000 Units PO daily  Current vitamin-D level is 48  Calcium level is also at goal   Plan to continue cholecalciferol at current dose and recheck vitamin- D level with the next visit   Returns to Nephrology office in 4 months  Future labs ordered  In the interim patient will also attend [facility] Smart class   Portions of the record may have been created with voice recognition software  Occasional wrong word or "sound a like" substitutions may have occurred due to the inherent limitations of voice recognition software  Read the chart carefully and recognize, using context, where substitutions have occurred  If you have any questions, please contact the dictating provider

## 2021-05-04 ENCOUNTER — OFFICE VISIT (OUTPATIENT)
Dept: CARDIOLOGY CLINIC | Facility: CLINIC | Age: 86
End: 2021-05-04
Payer: MEDICARE

## 2021-05-04 VITALS
HEART RATE: 68 BPM | OXYGEN SATURATION: 95 % | HEIGHT: 69 IN | SYSTOLIC BLOOD PRESSURE: 118 MMHG | BODY MASS INDEX: 32.44 KG/M2 | WEIGHT: 219 LBS | DIASTOLIC BLOOD PRESSURE: 64 MMHG

## 2021-05-04 DIAGNOSIS — I25.10 CORONARY ARTERY DISEASE INVOLVING NATIVE CORONARY ARTERY OF NATIVE HEART WITHOUT ANGINA PECTORIS: ICD-10-CM

## 2021-05-04 DIAGNOSIS — E78.2 MIXED HYPERLIPIDEMIA: ICD-10-CM

## 2021-05-04 DIAGNOSIS — I10 ESSENTIAL HYPERTENSION: ICD-10-CM

## 2021-05-04 DIAGNOSIS — I35.0 NONRHEUMATIC AORTIC VALVE STENOSIS: Primary | ICD-10-CM

## 2021-05-04 PROCEDURE — 99214 OFFICE O/P EST MOD 30 MIN: CPT | Performed by: INTERNAL MEDICINE

## 2021-05-04 RX ORDER — ISOSORBIDE DINITRATE 10 MG/1
10 TABLET ORAL 2 TIMES DAILY
Qty: 180 TABLET | Refills: 3
Start: 2021-05-04 | End: 2021-09-22 | Stop reason: SDUPTHER

## 2021-05-04 NOTE — PROGRESS NOTES
[facility] [address] [facility] [id number]  Cardiology Follow Up    Versie Grade  1935  [id number]      1  Nonrheumatic aortic valve stenosis  Echo complete with contrast if indicated   2  Coronary artery disease involving native coronary artery of native heart without angina pectoris  isosorbide dinitrate (ISORDIL) 10 mg tablet   3  Mixed hyperlipidemia     4  Essential hypertension         Chief Complaint   Patient presents with    Follow-up    Coronary Artery Disease       Interval History:   Patient presents for follow-up visit  Patient denies any chest pain  Patient does have some shortness of breath with exertion  No history of leg edema orthopnea PND  No history of presyncope syncope  He states that he has been compliant with all his present medications  He is not able to do much because of shortness of breath  Patient does have significant cardiac history  Patient was not considered a good candidate for CABG  Patient had intervention to LAD couple of years ago  Patient does have for some residual coronary artery disease which can give him symptoms      Patient Active Problem List   Diagnosis    Essential hypertension    HLD (hyperlipidemia)    CAD in native artery    Mild to moderate aortic stenosis    Stage 3b chronic kidney disease (HCC)    History of CVA (cerebrovascular accident)    Paroxysmal atrial fibrillation (HCC)    Type 2 diabetes mellitus with stage 3b chronic kidney disease, without long-term current use of insulin ([facility] [address] )    Hypertensive kidney disease with stage 4 chronic kidney disease (HCC)    Other proteinuria    History of DVT of peroneal vein left lower extremity    Vitamin D deficiency    Basilar artery stenosis    Cerebrovascular small vessel disease    Stage 4 chronic kidney disease ([address] )     Past Medical History:   Diagnosis Date    Acute deep vein thrombosis (DVT) of brachial vein of left upper extremity ([facility] ) 11/24/2017    Acute deep vein thrombosis (DVT) of left peroneal vein (HCC)     Acute ST elevation myocardial infarction ([place] [address] )     Aortic valve stenosis     Atrial fibrillation (HCC)     Basilar artery stenosis     Basilar artery stenosis     Benign prostatic hyperplasia with lower urinary tract symptoms     CAD (coronary artery disease)     Chronic kidney disease     Closed fracture of multiple ribs of left side with routine healing 9/3/2020    DM2 (diabetes mellitus, type 2) ([place] [address] )     History of transfusion     HLD (hyperlipidemia)     HTN (hypertension)     Middle cerebral artery stenosis     Proteinuria     Symptomatic bradycardia     Transient cerebral ischemia     Vitamin D deficiency      Social History     Socioeconomic History    Marital status:       Spouse name: Not on file    Number of children: Not on file    Years of education: Not on file    Highest education level: Not on file   Occupational History    Not on file   Social Needs    Financial resource strain: Not on file    Food insecurity     Worry: Not on file     Inability: Not on file    Transportation needs     Medical: Not on file     Non-medical: Not on file   Tobacco Use    Smoking status: Never Smoker    Smokeless tobacco: Never Used   Substance and Sexual Activity    Alcohol use: Never     Frequency: Never     Binge frequency: Never    Drug use: No    Sexual activity: Not Currently     Partners: Female     Birth control/protection: None   Lifestyle    Physical activity     Days per week: 0 days     Minutes per session: 0 min    Stress: Not at all   Relationships    Social connections     Talks on phone: Not on file     Gets together: Not on file     Attends Protestant service: Not on file     Active member of club or organization: Not on file     Attends meetings of clubs or organizations: Not on file     Relationship status: Not on file    Intimate partner violence     Fear of current or ex partner: Not on file     Emotionally abused: Not on file     Physically abused: Not on file     Forced sexual activity: Not on file   Other Topics Concern    Not on file   Social History Narrative    Active advance directive (as per [employer])     No advance directive on file (as per [employer])       Family History   Problem Relation Age of Onset    Emphysema Father     Emphysema Sister      Past Surgical History:   Procedure Laterality Date    CARDIAC CATHETERIZATION      Outcome: successful; last assessed: 02/03/2015    CARDIAC CATHETERIZATION  11/26/2019    CORONARY ANGIOPLASTY WITH STENT PLACEMENT  2008    stent to LAD     HAND SURGERY      thumb    HIP HARDWARE REMOVAL      TOTAL HIP ARTHROPLASTY Right     TOTAL HIP ARTHROPLASTY Bilateral        Current Outpatient Medications:     amLODIPine (NORVASC) 10 mg tablet, TAKE 1 TABLET BY MOUTH  DAILY, Disp: 90 tablet, Rfl: 3    apixaban (ELIQUIS) 2 5 mg, Take 1 tablet (2 5 mg total) by mouth 2 (two) times a day, Disp: 180 tablet, Rfl: 3    Blood Glucose Monitoring Suppl (ONE TOUCH ULTRA MINI) w/Device KIT, by Does not apply route, Disp: , Rfl:     cholecalciferol (VITAMIN D3) 1,000 units tablet, Take 1,000 Units by mouth daily, Disp: , Rfl:     clopidogrel (PLAVIX) 75 mg tablet, Take 1 tablet (75 mg total) by mouth daily, Disp: 90 tablet, Rfl: 3    doxazosin (CARDURA) 8 MG tablet, TAKE 1 TABLET BY MOUTH  DAILY, Disp: 90 tablet, Rfl: 2    gabapentin (NEURONTIN) 100 mg capsule, Take 1 capsule (100 mg total) by mouth 2 (two) times a day, Disp: 180 capsule, Rfl: 3    glucose blood (ONE TOUCH ULTRA TEST) test strip, Test once daily, Disp: 100 each, Rfl: 5    isosorbide dinitrate (ISORDIL) 10 mg tablet, Take 1 tablet (10 mg total) by mouth 2 (two) times a day, Disp: 180 tablet, Rfl: 3    lisinopril (ZESTRIL) 40 mg tablet, Take 1 tablet (40 mg total) by mouth daily, Disp: 90 tablet, Rfl: 3    metoprolol tartrate (LOPRESSOR) 25 mg tablet, Take 1 tablet (25 mg total) by mouth every 12 (twelve) hours, Disp: 180 tablet, Rfl: 3    nitroglycerin (NITROSTAT) 0 4 mg SL tablet, Place 1 tablet (0 4 mg total) under the tongue every 5 (five) minutes as needed for chest pain, Disp: 25 tablet, Rfl: 4    omega-3-acid ethyl esters (LOVAZA) 1 g capsule, Take 1,200 mg by mouth daily, Disp: , Rfl:     pyridoxine (RA VITAMIN B-6) 100 MG tablet, Take 1 tablet by mouth daily, Disp: , Rfl:     vitamin B-12 (CYANOCOBALAMIN) 100 MCG tablet, Take 1,000 mcg by mouth daily, Disp: , Rfl:   Allergies   Allergen Reactions    Celecoxib Other (See Comments)     Per pt does NOT remember type of reaction or severity level    Hydromorphone Other (See Comments)     Per pt does NOT remember type of reaction or severity level    Pravastatin Hives    Statins Other (See Comments)     Per pt does NOT remember type of reaction or severity level       Labs:  Appointment on 04/21/2021   Component Date Value    WBC 04/21/2021 6 59     RBC 04/21/2021 3 94     Hemoglobin 04/21/2021 12 1     Hematocrit 04/21/2021 37 9     MCV 04/21/2021 96     MCH 04/21/2021 30 7     MCHC 04/21/2021 31 9     RDW 04/21/2021 13 1     MPV 04/21/2021 10 1     Platelets 87/25/5436 166     nRBC 04/21/2021 0     Neutrophils Relative 04/21/2021 59     Immat GRANS % 04/21/2021 0     Lymphocytes Relative 04/21/2021 28     Monocytes Relative 04/21/2021 7     Eosinophils Relative 04/21/2021 5     Basophils Relative 04/21/2021 1     Neutrophils Absolute 04/21/2021 3 88     Immature Grans Absolute 04/21/2021 0 02     Lymphocytes Absolute 04/21/2021 1 81     Monocytes Absolute 04/21/2021 0 48     Eosinophils Absolute 04/21/2021 0 35     Basophils Absolute 04/21/2021 0 05     Sodium 04/21/2021 138     Potassium 04/21/2021 4 9     Chloride 04/21/2021 104     CO2 04/21/2021 26     ANION GAP 04/21/2021 8     BUN 04/21/2021 37*    Creatinine 04/21/2021 2 12*    Glucose, Fasting 04/21/2021 186*    Calcium 04/21/2021 8 8     eGFR 04/21/2021 28     Clarity, UA 04/21/2021 Clear     Color, UA 04/21/2021 Yellow     Specific Gravity, UA 04/21/2021 1 020     pH, UA 04/21/2021 5 5     Glucose, UA 04/21/2021 Negative     Ketones, UA 04/21/2021 Negative     Blood, UA 04/21/2021 Negative     Protein, UA 04/21/2021 30 (1+)*    Nitrite, UA 04/21/2021 Negative     Bilirubin, UA 04/21/2021 Negative     Urobilinogen, UA 04/21/2021 0 2     Leukocytes, UA 04/21/2021 Negative     WBC, UA 04/21/2021 0-1*    RBC, UA 04/21/2021 None Seen     Bacteria, UA 04/21/2021 Occasional     Epithelial Cells 04/21/2021 Occasional     MUCUS THREADS 04/21/2021 Occasional*    Creatinine, Ur 04/21/2021 182 0     Microalbum  ,U,Random 04/21/2021 253 0*    Microalb Creat Ratio 04/21/2021 139*    Phosphorus 04/21/2021 3 6     Uric Acid 04/21/2021 7 2     PTH 04/21/2021 69 1     Vit D, 25-Hydroxy 04/21/2021 48 1    Lab on 03/08/2021   Component Date Value    Hemoglobin A1C 03/08/2021 6 9*    EAG 03/08/2021 151     Cholesterol 03/08/2021 163     Triglycerides 03/08/2021 105     HDL, Direct 03/08/2021 43     LDL Calculated 03/08/2021 99     Non-HDL-Chol (CHOL-HDL) 03/08/2021 120     WBC 03/08/2021 4 89     RBC 03/08/2021 4 05     Hemoglobin 03/08/2021 12 5     Hematocrit 03/08/2021 38 9     MCV 03/08/2021 96     MCH 03/08/2021 30 9     MCHC 03/08/2021 32 1     RDW 03/08/2021 13 0     MPV 03/08/2021 9 5     Platelets 80/78/8178 151     nRBC 03/08/2021 0     Neutrophils Relative 03/08/2021 66     Immat GRANS % 03/08/2021 0     Lymphocytes Relative 03/08/2021 19     Monocytes Relative 03/08/2021 10     Eosinophils Relative 03/08/2021 4     Basophils Relative 03/08/2021 1     Neutrophils Absolute 03/08/2021 3 24     Immature Grans Absolute 03/08/2021 0 02     Lymphocytes Absolute 03/08/2021 0 94     Monocytes Absolute 03/08/2021 0 47     Eosinophils Absolute 03/08/2021 0 19     Basophils Absolute 03/08/2021 0 03     Sodium 03/08/2021 132*    Potassium 03/08/2021 4 8     Chloride 03/08/2021 99*    CO2 03/08/2021 29     ANION GAP 03/08/2021 4     BUN 03/08/2021 33*    Creatinine 03/08/2021 2 25*    Glucose 03/08/2021 356*    Calcium 03/08/2021 8 9     Corrected Calcium 03/08/2021 9 6     AST 03/08/2021 14     ALT 03/08/2021 16     Alkaline Phosphatase 03/08/2021 51     Total Protein 03/08/2021 7 0     Albumin 03/08/2021 3 1*    Total Bilirubin 03/08/2021 0 37     eGFR 03/08/2021 26     TSH 3RD GENERATON 03/08/2021 1 057      Imaging: No results found  Review of Systems:  Review of Systems     REVIEW OF SYSTEMS:  Constitutional:  Denies fever or chills   Eyes:  Denies change in visual acuity   HENT:  Denies nasal congestion or sore throat   Respiratory:   shortness of breath   Cardiovascular:  Denies chest pain or edema   GI:  Denies abdominal pain, nausea, vomiting, bloody stools or diarrhea   :  Denies dysuria, frequency, difficulty in micturition and nocturia  Musculoskeletal:  Denies back pain or joint pain   Neurologic:  Denies headache, focal weakness or sensory changes   Endocrine:  Denies polyuria or polydipsia   Lymphatic:  Denies swollen glands   Psychiatric:  Denies depression or anxiety     Physical Exam:    /64   Pulse 68   Ht 5' 9" (1 753 m)   Wt 99 3 kg (219 lb)   SpO2 95%   BMI 32 34 kg/m²     Physical Exam   PHYSICAL EXAM:  General:  Patient is not in acute distress   Head: Normocephalic, Atraumatic  HEENT:  Both pupils normal-size atraumatic, normocephalic, nonicteric  Neck:  JVP not raised  Trachea central  No carotid bruit  Respiratory:  normal breath sounds no crackles  no rhonchi  Cardiovascular:  Regular rate and rhythm no S3  2/6 systolic ejection murmur in the right sternal border  GI:  Abdomen soft nontender  No organomegaly  Lymphatic:  No cervical or inguinal lymphadenopathy  Neurologic:  Patient is awake alert, oriented    Grossly nonfocal    Extremities no edema    Discussion/Summary:      Patient with multiple medical problems who seems to be doing reasonably well from cardiac standpoint  Previous studies reviewed with patient  Medications reviewed and possible side effects discussed  concepts of cardiovascular disease , signs and symptoms of heart disease  Dietary and risk factor modification reinforced  All questions answered  Safety measures reviewed  Patient advised to report any problems prompting medical attention  Symptoms to watch out from cardiac standpoint which would indicate the need for further cardiac evaluation discussed with patient and family  Patient also has stage IV CKD followed by Nephrology  Echocardiogram will be done to assess ejection fraction and status of aortic stenosis  Overall conservative management at this time  Patient and family agreement with the plan of care  Follow-up in 6 months or earlier as needed  Follow-up with primary care physician

## 2021-05-11 ENCOUNTER — OFFICE VISIT (OUTPATIENT)
Dept: INTERNAL MEDICINE CLINIC | Facility: CLINIC | Age: 86
End: 2021-05-11
Payer: MEDICARE

## 2021-05-11 VITALS
OXYGEN SATURATION: 97 % | BODY MASS INDEX: 32.75 KG/M2 | TEMPERATURE: 98.2 F | SYSTOLIC BLOOD PRESSURE: 122 MMHG | HEART RATE: 71 BPM | DIASTOLIC BLOOD PRESSURE: 86 MMHG | WEIGHT: 221.8 LBS

## 2021-05-11 DIAGNOSIS — N18.32 TYPE 2 DIABETES MELLITUS WITH STAGE 3B CHRONIC KIDNEY DISEASE, WITHOUT LONG-TERM CURRENT USE OF INSULIN (HCC): Primary | Chronic | ICD-10-CM

## 2021-05-11 DIAGNOSIS — R22.2 LUMP IN CHEST: ICD-10-CM

## 2021-05-11 DIAGNOSIS — E11.22 TYPE 2 DIABETES MELLITUS WITH STAGE 3B CHRONIC KIDNEY DISEASE, WITHOUT LONG-TERM CURRENT USE OF INSULIN (HCC): Primary | Chronic | ICD-10-CM

## 2021-05-11 PROCEDURE — 99214 OFFICE O/P EST MOD 30 MIN: CPT | Performed by: NURSE PRACTITIONER

## 2021-05-11 RX ORDER — GLIMEPIRIDE 1 MG/1
1 TABLET ORAL
Qty: 30 TABLET | Refills: 5 | Status: SHIPPED | OUTPATIENT
Start: 2021-05-11 | End: 2021-07-16 | Stop reason: SDUPTHER

## 2021-05-11 NOTE — PROGRESS NOTES
INTERNAL MEDICINE FOLLOW-UP OFFICE VISIT  [facility] [address] [place] [address]    NAME: Kathleen Espino  AGE: 82 y o  SEX: male    DATE OF ENCOUNTER: 5/11/2021   Assessment and Plan:   Patient advised to continue monitoring glucose at home  Will restart glimepiride 1 mg daily  Advised to watch dietary intake of sugars and carbs and increase protein intake  Will refer to general surgery for cyst like lumps on chest  Patient is not a candidate for surgery but may tolerate an in office procedure  Problem List Items Addressed This Visit        Endocrine    Type 2 diabetes mellitus with stage 3b chronic kidney disease, without long-term current use of insulin (HCC) - Primary (Chronic)    Relevant Medications    glimepiride (AMARYL) 1 mg tablet      Other Visit Diagnoses     Lump in chest        Relevant Orders    Ambulatory referral to General Surgery          Return if symptoms worsen or fail to improve, for Next scheduled follow up  Counseling:     · Medication Side Effects - Adverse side effects of medications were reviewed with the patient/guardian today: Yes  · Counseling was given regarding: Prognosis, Risks and benefits of tx options, Intructions for management, Patient and family education, Importance of tx compliance, Risk factor reductions and Impressions  · Barriers to treatment include: No identified barriers      Chief Complaint:     Chief Complaint   Patient presents with    Hyperglycemia        History of Present Illness:     Patient here today with his daughter  Has been having high blood sugars at home  List today shows glucose from 130's to high 100's, and a few into the 200-300 range  Patient states it rarely goes high above 200  He does not follow a diabetic diet  Eats ice cream every afternoon  During the day has pbj sandwiches, hotdog, hamburger  States he eats bananas or oranges   He is not interested in diabetic educator referral  Was on glimepiride and glyburide in the past but was taken off of this  Last year during hospitalizations patient required insulin while inpatient  Last A1C was 6 9 two months ago  Patient denies s/s of hypo/hyperglycemia  States he was taken off the medication in the past because insurance coverage  Also has complaints of sob  Follows with Dr Gwen Alexander for this and has follow up echo scheduled  Complains of lump on chest  Daughter states it is a fatty tumor  Patient states it hurts and recently is more tender  The following portions of the patient's history were reviewed and updated as appropriate: allergies, current medications, past family history, past medical history, past social history, past surgical history and problem list      Review of Systems:     Review of Systems   Constitutional: Negative for chills and fatigue  Eyes: Negative for visual disturbance  Respiratory: Positive for shortness of breath  Cardiovascular: Negative for chest pain  Musculoskeletal: Positive for back pain  Skin: Positive for color change (lump on chest is red)  Neurological: Negative for dizziness, weakness, light-headedness and headaches  Psychiatric/Behavioral: Negative for sleep disturbance          Problem List:     Patient Active Problem List   Diagnosis    Essential hypertension    HLD (hyperlipidemia)    CAD in native artery    Mild to moderate aortic stenosis    Stage 3b chronic kidney disease (HCC)    History of CVA (cerebrovascular accident)    Paroxysmal atrial fibrillation (HCC)    Type 2 diabetes mellitus with stage 3b chronic kidney disease, without long-term current use of insulin ([address] )    Hypertensive kidney disease with stage 4 chronic kidney disease ([address] )    Other proteinuria    History of DVT of peroneal vein left lower extremity    Vitamin D deficiency    Basilar artery stenosis    Cerebrovascular small vessel disease    Stage 4 chronic kidney disease (HCC)        Objective:     /86 (BP Location: Left arm, Patient Position: Sitting, Cuff Size: Standard)   Pulse 71   Temp 98 2 °F (36 8 °C) (Temporal) Comment: w/ tylenol  Wt 101 kg (221 lb 12 8 oz) Comment: w/ shoes denied off  SpO2 97%   BMI 32 75 kg/m²     Physical Exam  Vitals signs reviewed  Constitutional:       General: He is not in acute distress  Appearance: Normal appearance  He is not ill-appearing  HENT:      Head: Normocephalic and atraumatic  Cardiovascular:      Rate and Rhythm: Normal rate and regular rhythm  Heart sounds: Normal heart sounds, S1 normal and S2 normal    Pulmonary:      Effort: Pulmonary effort is normal  No accessory muscle usage  Breath sounds: Normal breath sounds  No wheezing  Chest:      Chest wall: Mass present  Comments: Two soft mobile cysts on chest    Skin:     General: Skin is warm and dry  Capillary Refill: Capillary refill takes less than 2 seconds  Findings: No rash  Neurological:      General: No focal deficit present  Mental Status: He is alert and oriented to person, place, and time  Motor: Motor function is intact  Psychiatric:         Attention and Perception: Attention and perception normal          Mood and Affect: Mood and affect normal          Speech: Speech normal          Behavior: Behavior normal  Behavior is cooperative  Thought Content: Thought content normal          Pertinent Laboratory/Diagnostic Studies:    Laboratory Results: I have personally reviewed the pertinent laboratory results/reports   Radiology/Other Diagnostic Testing Results: I have personally reviewed pertinent reports         Current Medications:     Current Outpatient Medications   Medication Sig Dispense Refill    amLODIPine (NORVASC) 10 mg tablet TAKE 1 TABLET BY MOUTH  DAILY 90 tablet 3    apixaban (ELIQUIS) 2 5 mg Take 1 tablet (2 5 mg total) by mouth 2 (two) times a day 180 tablet 3    Blood Glucose Monitoring Suppl (ONE TOUCH ULTRA MINI) w/Device KIT by Does not apply route      cholecalciferol (VITAMIN D3) 1,000 units tablet Take 1,000 Units by mouth daily      clopidogrel (PLAVIX) 75 mg tablet Take 1 tablet (75 mg total) by mouth daily 90 tablet 3    doxazosin (CARDURA) 8 MG tablet TAKE 1 TABLET BY MOUTH  DAILY 90 tablet 2    gabapentin (NEURONTIN) 100 mg capsule Take 1 capsule (100 mg total) by mouth 2 (two) times a day 180 capsule 3    glucose blood (ONE TOUCH ULTRA TEST) test strip Test once daily 100 each 5    isosorbide dinitrate (ISORDIL) 10 mg tablet Take 1 tablet (10 mg total) by mouth 2 (two) times a day 180 tablet 3    lisinopril (ZESTRIL) 40 mg tablet Take 1 tablet (40 mg total) by mouth daily 90 tablet 3    metoprolol tartrate (LOPRESSOR) 25 mg tablet Take 1 tablet (25 mg total) by mouth every 12 (twelve) hours (Patient taking differently: Take 25 mg by mouth every 12 (twelve) hours Take 12 5mg daily) 180 tablet 3    nitroglycerin (NITROSTAT) 0 4 mg SL tablet Place 1 tablet (0 4 mg total) under the tongue every 5 (five) minutes as needed for chest pain 25 tablet 4    omega-3-acid ethyl esters (LOVAZA) 1 g capsule Take 1,200 mg by mouth daily      pyridoxine (RA VITAMIN B-6) 100 MG tablet Take 1 tablet by mouth daily      vitamin B-12 (CYANOCOBALAMIN) 100 MCG tablet Take 1,000 mcg by mouth daily      glimepiride (AMARYL) 1 mg tablet Take 1 tablet (1 mg total) by mouth daily with breakfast 30 tablet 5     No current facility-administered medications for this visit  Patient Instructions   Basic Carbohydrate Counting   AMBULATORY CARE:   Carbohydrate counting  is a way to plan your meals by counting the amount of carbohydrate in foods  Carbohydrates are the sugars, starches, and fiber found in fruit, grains, vegetables, and milk products  Carbohydrates increase your blood sugar levels  Carbohydrate counting can help you eat the right amount of carbohydrate to keep your blood sugar levels under control     What you need to know about planning meals using carbohydrate counting:  · A dietitian or healthcare provider will help you develop a healthy meal plan that works best for you  You will be taught how much carbohydrate to eat or drink for each meal and snack  Your meal plan will be based on your age, weight, usual food intake, and physical activity level  If you have diabetes, it will also include your blood sugar levels and diabetes medicine  Once you know how much carbohydrate you should eat, you can decide what type of food you want to eat  · You will need to know what foods contain carbohydrate and how much they contain  Keep track of the amount of carbohydrate in meals and snacks in order to follow your meal plan  Do not avoid carbohydrates or skip meals  Your blood sugar may fall too low if you do not eat enough carbohydrate or you skip meals  Foods that contain carbohydrate:   · Breads:  Each serving of food listed below contains about 15 g of carbohydrate   ? 1 slice of bread (1 ounce) or 1 flour or corn tortilla (6 inch)    ? ½ of a hamburger bun or ¼ of a large bagel (about 1 ounce)    ? 1 pancake (about 4 inches across and ¼ inch thick)    · Cereals and grains:  Serving sizes of ready-to-eat cereals vary  Look at the serving size and the total carbohydrate amount listed on the food label  Each serving of food listed below contains about 15 g of carbohydrate   ? ¾ cup of dry, unsweetened, ready-to-eat cereal or ¼ cup of low-fat granola     ? ½ cup of oatmeal or other cooked cereal     ? ? cup of cooked rice or pasta    · Starchy vegetables and beans:  Each serving of food listed below contains about 15 g of carbohydrate   ? ½ cup of corn, green peas, sweet potatoes, or mashed potatoes    ? ¼ of a large baked potato    ? ½ cup of beans, lentils, and peas (garbanzo, lyle, kidney, white, split, black-eyed)    · Crackers and snacks:  Each serving of food listed below contains about 15 g of carbohydrate       ? 3 lana cracker squares or 8 animal crackers     ? 6 saltine-type crackers    ? 3 cups of popcorn or ¾ ounce of pretzels, potato chips, or tortilla chips    · Fruit:  Each serving of food listed below contains about 15 g of carbohydrate   ? 1 small (4 ounce) piece of fresh fruit or ¾ to 1 cup of fresh fruit    ? ½ cup of canned or frozen fruit, packed in natural juice    ? ½ cup (4 ounces) of unsweetened fruit juice    ? 2 tablespoons of dried fruit    · Desserts or sugary foods:  Each serving of food listed below contains about 15 g of carbohydrate   ? 2-inch square unfrosted cake or brownie     ? 2 small cookies    ? ½ cup of ice cream, frozen yogurt, or nondairy frozen yogurt    ? ¼ cup of sherbet or sorbet    ? 1 tablespoon of regular syrup, jam, or jelly    ? 2 tablespoons of light syrup    · Milk and yogurt:  Foods from the milk group contain about 12 g of carbohydrate per serving  ? 1 cup of fat-free or low-fat milk    ? 1 cup of soy milk    ? ? cup of fat-free, yogurt sweetened with artificial sweetener    · Non-starchy vegetables:  Each serving contains about 5 g of carbohydrate   Three servings of non-starch vegetables count as 1 carbohydrate serving  ? ½ cup of cooked vegetables or 1 cup of raw vegetables  This includes beets, broccoli, cabbage, cauliflower, cucumber, mushrooms, tomatoes, and zucchini    ? ½ cup of vegetable juice    How to use carbohydrate counting to plan meals:   · Count carbohydrate amounts using serving sizes:      ? Pasta dinner example: You plan to have pasta, tossed salad, and an 8-ounce glass of milk  Your healthcare provider tells you that you may have 4 carbohydrate servings for dinner  One carbohydrate serving of pasta is ? cup  One cup of pasta will equal 3 carbohydrate servings  An 8-ounce glass of milk will count as 1 carbohydrate serving  These amounts of food would equal 4 carbohydrate servings   One cup of tossed salad does not count toward your carbohydrate servings  · Count carbohydrate amounts using food labels:  Find the total amount of carbohydrate in a packaged food by reading the food label  Food labels tell you the serving size of the food and the total carbohydrate amount in each serving  Find the serving size on the food label and then decide how many servings you will eat  Multiply the number of servings you plan to eat by the carbohydrate amount per serving  ? Granola bar snack example: Your meal plan allows you to have 2 carbohydrate servings (30 grams) of carbohydrate for a snack  You plan to eat 1 package of granola bars, which contains 2 bars  According to the food label, the serving size of food in this package is 1 bar  Each serving (1 bar) contains 25 grams of carbohydrate  The total amount of carbohydrate in this package of granola bars would be 50 g  Based on your meal plan, you should eat only 1 bar  Follow up with your healthcare provider as directed:  Write down your questions so you remember to ask them during your visits  © Copyright [facility] [address] Information is for End User's use only and may not be sold, redistributed or otherwise used for commercial purposes  All illustrations and images included in CareNotes® are the copyrighted property of [employer]  or [address] [place] [address]  The above information is an  only  It is not intended as medical advice for individual conditions or treatments  Talk to your doctor, nurse or pharmacist before following any medical regimen to see if it is safe and effective for you          JALEEL Lebron  [facility] [address] [place] [address]

## 2021-05-11 NOTE — PATIENT INSTRUCTIONS

## 2021-05-26 ENCOUNTER — HOSPITAL ENCOUNTER (OUTPATIENT)
Dept: NON INVASIVE DIAGNOSTICS | Facility: CLINIC | Age: 86
Discharge: HOME | End: 2021-05-26
Payer: MEDICARE

## 2021-05-26 DIAGNOSIS — I35.0 NONRHEUMATIC AORTIC VALVE STENOSIS: ICD-10-CM

## 2021-05-26 PROCEDURE — 93306 TTE W/DOPPLER COMPLETE: CPT

## 2021-05-27 PROCEDURE — 93306 TTE W/DOPPLER COMPLETE: CPT | Performed by: INTERNAL MEDICINE

## 2021-06-01 ENCOUNTER — CONSULT (OUTPATIENT)
Dept: SURGERY | Facility: CLINIC | Age: 86
End: 2021-06-01
Payer: MEDICARE

## 2021-06-01 VITALS
SYSTOLIC BLOOD PRESSURE: 128 MMHG | HEIGHT: 69 IN | DIASTOLIC BLOOD PRESSURE: 60 MMHG | TEMPERATURE: 97.4 F | WEIGHT: 219.4 LBS | BODY MASS INDEX: 32.5 KG/M2 | HEART RATE: 67 BPM

## 2021-06-01 DIAGNOSIS — L72.3 INFECTED SEBACEOUS CYST: Primary | ICD-10-CM

## 2021-06-01 DIAGNOSIS — L08.9 INFECTED SEBACEOUS CYST: Primary | ICD-10-CM

## 2021-06-01 PROBLEM — R22.2 LUMP IN CHEST: Status: ACTIVE | Noted: 2021-06-01

## 2021-06-01 PROCEDURE — 10060 I&D ABSCESS SIMPLE/SINGLE: CPT | Performed by: STUDENT IN AN ORGANIZED HEALTH CARE EDUCATION/TRAINING PROGRAM

## 2021-06-01 PROCEDURE — 99204 OFFICE O/P NEW MOD 45 MIN: CPT | Performed by: STUDENT IN AN ORGANIZED HEALTH CARE EDUCATION/TRAINING PROGRAM

## 2021-06-01 PROCEDURE — 87186 SC STD MICRODIL/AGAR DIL: CPT | Performed by: STUDENT IN AN ORGANIZED HEALTH CARE EDUCATION/TRAINING PROGRAM

## 2021-06-01 PROCEDURE — 87077 CULTURE AEROBIC IDENTIFY: CPT | Performed by: STUDENT IN AN ORGANIZED HEALTH CARE EDUCATION/TRAINING PROGRAM

## 2021-06-01 PROCEDURE — 87070 CULTURE OTHR SPECIMN AEROBIC: CPT | Performed by: STUDENT IN AN ORGANIZED HEALTH CARE EDUCATION/TRAINING PROGRAM

## 2021-06-01 PROCEDURE — 87205 SMEAR GRAM STAIN: CPT | Performed by: STUDENT IN AN ORGANIZED HEALTH CARE EDUCATION/TRAINING PROGRAM

## 2021-06-01 RX ORDER — SULFAMETHOXAZOLE AND TRIMETHOPRIM 800; 160 MG/1; MG/1
1 TABLET ORAL EVERY 12 HOURS SCHEDULED
Qty: 14 TABLET | Refills: 0 | Status: SHIPPED | OUTPATIENT
Start: 2021-06-01 | End: 2021-06-08

## 2021-06-01 NOTE — PROGRESS NOTES
Assessment/Plan:  27-year-old male with infected sebaceous cyst of chest  - primary care physician note from 05/11/2021 reviewed  - cardiology note from 05/04/2021 reviewed  - CBC and BMP from 04/21/2021 reviewed  -  CT scan of the chest, abdomen, pelvis from 07/31/2020 reviewed, noted to have 2 sebaceous cysts on the anterior chest wall subcutaneous  - patient with swelling and pain in his mid sternum  - did not notice a lump there prior  - area has small amount of drainage  - incision and drainage of infected sebaceous cyst done in office  - patient will need packing daily to the area with dry dressing  - will prescribe Bactrim  DS for 7 days  - cultures taken  - patient to follow-up in office in 1 week     1  Infected sebaceous cyst  -     Ambulatory referral to General Surgery  -     sulfamethoxazole-trimethoprim (BACTRIM DS) 800-160 mg per tablet; Take 1 tablet by mouth every 12 (twelve) hours for 7 days  -     Wound culture and Gram stain               Subjective: lump on chest     Patient ID: Kelsea Preciado is a 82 y o  male  Triage Notes:     Patient is an 27-year-old male who presents to office for evaluation of pain and swelling in his mid chest   Patient has had pain and a lump in his mid chest for the last few weeks  Patient describes the pain as significant and has a small amount of drainage from the area  He has never had this before  He has never noticed it or a cyst there before  Otherwise he is tolerating a diet well and having normal bowel function        The following portions of the patient's history were reviewed and updated as appropriate:   He  has a past medical history of Acute deep vein thrombosis (DVT) of brachial vein of left upper extremity ([address] ) (11/24/2017), Acute deep vein thrombosis (DVT) of left peroneal vein (HCC), Acute ST elevation myocardial infarction [facility]), Aortic valve stenosis, Atrial fibrillation ([address] ), Basilar artery stenosis, Basilar artery stenosis, Benign prostatic hyperplasia with lower urinary tract symptoms, CAD (coronary artery disease), Chronic kidney disease, Closed fracture of multiple ribs of left side with routine healing (9/3/2020), DM2 (diabetes mellitus, type 2) ([facility] ), History of transfusion, HLD (hyperlipidemia), HTN (hypertension), Middle cerebral artery stenosis, Proteinuria, Symptomatic bradycardia, Transient cerebral ischemia, and Vitamin D deficiency  He   Patient Active Problem List    Diagnosis Date Noted    Lump in chest 06/01/2021    Stage 4 chronic kidney disease ([facility] ) 04/29/2021    Cerebrovascular small vessel disease 11/12/2020    Basilar artery stenosis 05/04/2020    Vitamin D deficiency 02/05/2020    History of DVT of peroneal vein left lower extremity 12/16/2019    Hypertensive kidney disease with stage 4 chronic kidney disease ([place] [address] ) 10/08/2019    Other proteinuria 10/08/2019    Type 2 diabetes mellitus with stage 3b chronic kidney disease, without long-term current use of insulin ([place] [address] ) 06/27/2019    Paroxysmal atrial fibrillation ([place] [address] ) 03/04/2019    History of CVA (cerebrovascular accident) 02/21/2019    Stage 3b chronic kidney disease ([place] [address] ) 12/04/2018    Mild to moderate aortic stenosis 10/09/2018    Essential hypertension 11/24/2017    HLD (hyperlipidemia) 11/24/2017    CAD in native artery 11/24/2017     He  has a past surgical history that includes Total hip arthroplasty (Right); Hip hardware removal; Cardiac catheterization; Coronary angioplasty with stent (2008); Hand surgery; Total hip arthroplasty (Bilateral); and Cardiac catheterization (11/26/2019)  His family history includes Emphysema in his father and sister  He  reports that he has never smoked  He has never used smokeless tobacco  He reports that he does not drink alcohol or use drugs    Current Outpatient Medications on File Prior to Visit   Medication Sig    amLODIPine (NORVASC) 10 mg tablet TAKE 1 TABLET BY MOUTH  DAILY    apixaban (ELIQUIS) 2 5 mg Take 1 tablet (2 5 mg total) by mouth 2 (two) times a day    Blood Glucose Monitoring Suppl (ONE TOUCH ULTRA MINI) w/Device KIT by Does not apply route    cholecalciferol (VITAMIN D3) 1,000 units tablet Take 1,000 Units by mouth daily    clopidogrel (PLAVIX) 75 mg tablet Take 1 tablet (75 mg total) by mouth daily    doxazosin (CARDURA) 8 MG tablet TAKE 1 TABLET BY MOUTH  DAILY    gabapentin (NEURONTIN) 100 mg capsule Take 1 capsule (100 mg total) by mouth 2 (two) times a day    glimepiride (AMARYL) 1 mg tablet Take 1 tablet (1 mg total) by mouth daily with breakfast    glucose blood (ONE TOUCH ULTRA TEST) test strip Test once daily    isosorbide dinitrate (ISORDIL) 10 mg tablet Take 1 tablet (10 mg total) by mouth 2 (two) times a day    lisinopril (ZESTRIL) 40 mg tablet Take 1 tablet (40 mg total) by mouth daily    metoprolol tartrate (LOPRESSOR) 25 mg tablet Take 1 tablet (25 mg total) by mouth every 12 (twelve) hours (Patient taking differently: Take 25 mg by mouth every 12 (twelve) hours Take 12 5mg daily)    nitroglycerin (NITROSTAT) 0 4 mg SL tablet Place 1 tablet (0 4 mg total) under the tongue every 5 (five) minutes as needed for chest pain    omega-3-acid ethyl esters (LOVAZA) 1 g capsule Take 1,200 mg by mouth daily    pyridoxine (RA VITAMIN B-6) 100 MG tablet Take 1 tablet by mouth daily    vitamin B-12 (CYANOCOBALAMIN) 100 MCG tablet Take 1,000 mcg by mouth daily     No current facility-administered medications on file prior to visit  He is allergic to celecoxib; hydromorphone; pravastatin; and statins       Review of Systems   Constitutional: Negative for chills, fatigue and fever  HENT: Negative for congestion, hearing loss, rhinorrhea and sore throat  Eyes: Negative for pain and discharge  Respiratory: Negative for cough, chest tightness and shortness of breath  Cardiovascular: Negative for chest pain and palpitations     Gastrointestinal: Negative for abdominal pain, constipation, diarrhea, nausea and vomiting  Endocrine: Negative for cold intolerance and heat intolerance  Genitourinary: Negative for difficulty urinating and dysuria  Musculoskeletal: Negative for back pain and neck pain  Skin: Negative for color change and rash  Positive swelling and redness anterior chest   Allergic/Immunologic: Negative for environmental allergies and food allergies  Neurological: Negative for seizures and headaches  Hematological: Negative for adenopathy  Does not bruise/bleed easily  Psychiatric/Behavioral: Negative for confusion and hallucinations  Objective:      /60   Pulse 67   Temp (!) 97 4 °F (36 3 °C) (Temporal)   Ht 5' 9" (1 753 m)   Wt 99 5 kg (219 lb 6 4 oz)   BMI 32 40 kg/m²     Below is the patient's most recent value for Albumin, ALT, AST, BUN, Calcium, Chloride, Cholesterol, CO2, Creatinine, GFR, Glucose, HDL, Hematocrit, Hemoglobin, Hemoglobin A1C, LDL, Magnesium, Phosphorus, Platelets, Potassium, PSA, Sodium, Triglycerides, and WBC  Lab Results   Component Value Date    ALT 16 03/08/2021    AST 14 03/08/2021    BUN 37 (H) 04/21/2021    CALCIUM 8 8 04/21/2021     04/21/2021    CHOL 190 12/01/2015    CO2 26 04/21/2021    CREATININE 2 12 (H) 04/21/2021    HDL 43 03/08/2021    HCT 37 9 04/21/2021    HGB 12 1 04/21/2021    HGBA1C 6 9 (H) 03/08/2021    MG 2 2 01/30/2020    PHOS 3 6 04/21/2021     04/21/2021    K 4 9 04/21/2021    PSA 5 8 (H) 10/29/2019     12/01/2015    TRIG 105 03/08/2021    WBC 6 59 04/21/2021     Note: for a comprehensive list of the patient's lab results, access the Results Review activity  Physical Exam  Constitutional:       Appearance: Normal appearance  HENT:      Head: Normocephalic and atraumatic  Nose: Nose normal    Eyes:      General: No scleral icterus  Conjunctiva/sclera: Conjunctivae normal    Cardiovascular:      Rate and Rhythm: Normal rate        Heart sounds: Normal heart sounds  Pulmonary:      Effort: Pulmonary effort is normal       Breath sounds: Normal breath sounds  Abdominal:      General: There is no distension  Palpations: Abdomen is soft  Tenderness: There is no abdominal tenderness  Musculoskeletal:         General: No signs of injury  Skin:     General: Skin is warm  Coloration: Skin is not jaundiced  Comments:  Infected sebaceous cyst roughly 4 x  5 cm overlying inferior portion of sternum, small amount of serous drainage, tender to palpation, erythema present   Neurological:      General: No focal deficit present  Mental Status: He is alert and oriented to person, place, and time  Psychiatric:         Mood and Affect: Mood normal          Behavior: Behavior normal              Incision and Drainage    Date/Time: 6/1/2021 10:52 AM  Performed by: Clotilde Lawrence DO  Authorized by: Clotilde Lawrence DO   [place] Protocol:  Procedure performed by: (MA)  Consent: Verbal consent obtained  Written consent obtained  Risks and benefits: risks, benefits and alternatives were discussed  Consent given by: patient  Patient understanding: patient states understanding of the procedure being performed  Patient consent: the patient's understanding of the procedure matches consent given  Patient identity confirmed: verbally with patient      Patient location:  Clinic  Location:     Type:  Abscess    Location:  Trunk    Trunk location:  Chest  Pre-procedure details:     Skin preparation:  Betadine  Anesthesia (see MAR for exact dosages):      Anesthesia method:  Local infiltration    Local anesthetic:  Lidocaine 1% WITH epi  Procedure details:     Complexity:  Intermediate    Needle aspiration: no      Incision types:  Cruciate    Scalpel blade:  11    Approach:  Open    Incision depth:  Subcutaneous    Wound management:  Probed and deloculated, irrigated with saline and extensive cleaning    Drainage:  Purulent and bloody (Sebaceous cyst contents) Drainage amount:  Copious    Wound treatment:  Wound left open and packing placed    Packing material:  1 in packing  Post-procedure details:     Patient tolerance of procedure:   Tolerated well, no immediate complications

## 2021-06-05 LAB
BACTERIA WND AEROBE CULT: ABNORMAL
GRAM STN SPEC: ABNORMAL
GRAM STN SPEC: ABNORMAL

## 2021-06-08 ENCOUNTER — OFFICE VISIT (OUTPATIENT)
Dept: SURGERY | Facility: CLINIC | Age: 86
End: 2021-06-08
Payer: MEDICARE

## 2021-06-08 VITALS
BODY MASS INDEX: 32.38 KG/M2 | HEIGHT: 69 IN | TEMPERATURE: 97.5 F | SYSTOLIC BLOOD PRESSURE: 128 MMHG | HEART RATE: 68 BPM | DIASTOLIC BLOOD PRESSURE: 74 MMHG | WEIGHT: 218.6 LBS

## 2021-06-08 DIAGNOSIS — E11.22 TYPE 2 DIABETES MELLITUS WITH STAGE 3B CHRONIC KIDNEY DISEASE, WITHOUT LONG-TERM CURRENT USE OF INSULIN (HCC): Primary | Chronic | ICD-10-CM

## 2021-06-08 DIAGNOSIS — L08.9 INFECTED SEBACEOUS CYST OF SKIN: Primary | ICD-10-CM

## 2021-06-08 DIAGNOSIS — N18.32 TYPE 2 DIABETES MELLITUS WITH STAGE 3B CHRONIC KIDNEY DISEASE, WITHOUT LONG-TERM CURRENT USE OF INSULIN (HCC): Primary | Chronic | ICD-10-CM

## 2021-06-08 DIAGNOSIS — L72.3 INFECTED SEBACEOUS CYST OF SKIN: Primary | ICD-10-CM

## 2021-06-08 PROCEDURE — 99024 POSTOP FOLLOW-UP VISIT: CPT | Performed by: STUDENT IN AN ORGANIZED HEALTH CARE EDUCATION/TRAINING PROGRAM

## 2021-06-08 RX ORDER — BLOOD-GLUCOSE METER
KIT MISCELLANEOUS DAILY
Qty: 1 KIT | Refills: 0 | Status: SHIPPED | OUTPATIENT
Start: 2021-06-08

## 2021-06-08 NOTE — PROGRESS NOTES
Assessment/Plan:  53-year-old male with infected sebaceous cyst of chest  Status post incision and drainage  - wound still has some erythema but no longer any purulent drainage, small amount of sebum present  - packing had been removed but not replaced since incision and drainage, patient did not have a dressing over the wound at the time of exam  - will refer to visiting nurses to see their availability at this time  - discussed with daughter that the patient does need daily packing changes to help keep the area clean  - will see the patient 1 week to evaluate wound       1  Infected sebaceous cyst of skin  -     Ambulatory Referral to Home Health; Future               Subjective: I&D     Patient ID: Hettie Papito is a 82 y o  male  Triage Notes:     Patient is an 53-year-old male who presents to office for evaluation status post incision and drainage of infected sebaceous cyst of chest   States he has intermittent discomfort at the area  He has taken his antibiotics and still has 1 pill left  Has some minimal drainage  The patient has not replace the packing or kept dressing on the area since incision and drainage  The following portions of the patient's history were reviewed and updated as appropriate: allergies, current medications, past family history, past medical history, past social history, past surgical history and problem list     Review of Systems   Constitutional: Negative for chills, fatigue and fever  HENT: Negative for congestion, hearing loss, rhinorrhea and sore throat  Eyes: Negative for pain and discharge  Respiratory: Negative for cough, chest tightness and shortness of breath  Cardiovascular: Negative for chest pain and palpitations  Gastrointestinal: Negative for abdominal pain, constipation, diarrhea, nausea and vomiting  Endocrine: Negative for cold intolerance and heat intolerance  Genitourinary: Negative for difficulty urinating and dysuria     Musculoskeletal: Negative for back pain and neck pain  Skin: Negative for color change and rash  Allergic/Immunologic: Negative for environmental allergies and food allergies  Neurological: Negative for seizures and headaches  Hematological: Negative for adenopathy  Does not bruise/bleed easily  Psychiatric/Behavioral: Negative for confusion and hallucinations  Objective:      /74   Pulse 68   Temp 97 5 °F (36 4 °C) (Temporal)   Ht 5' 9" (1 753 m)   Wt 99 2 kg (218 lb 9 6 oz)   BMI 32 28 kg/m²     Below is the patient's most recent value for Albumin, ALT, AST, BUN, Calcium, Chloride, Cholesterol, CO2, Creatinine, GFR, Glucose, HDL, Hematocrit, Hemoglobin, Hemoglobin A1C, LDL, Magnesium, Phosphorus, Platelets, Potassium, PSA, Sodium, Triglycerides, and WBC  Lab Results   Component Value Date    ALT 16 03/08/2021    AST 14 03/08/2021    BUN 37 (H) 04/21/2021    CALCIUM 8 8 04/21/2021     04/21/2021    CHOL 190 12/01/2015    CO2 26 04/21/2021    CREATININE 2 12 (H) 04/21/2021    HDL 43 03/08/2021    HCT 37 9 04/21/2021    HGB 12 1 04/21/2021    HGBA1C 6 9 (H) 03/08/2021    MG 2 2 01/30/2020    PHOS 3 6 04/21/2021     04/21/2021    K 4 9 04/21/2021    PSA 5 8 (H) 10/29/2019     12/01/2015    TRIG 105 03/08/2021    WBC 6 59 04/21/2021     Note: for a comprehensive list of the patient's lab results, access the Results Review activity  Physical Exam  Constitutional:       Appearance: Normal appearance  HENT:      Head: Normocephalic and atraumatic  Nose: Nose normal    Eyes:      General: No scleral icterus  Conjunctiva/sclera: Conjunctivae normal    Cardiovascular:      Rate and Rhythm: Normal rate  Pulmonary:      Effort: Pulmonary effort is normal    Musculoskeletal:         General: No signs of injury  Skin:     General: Skin is warm  Coloration: Skin is not jaundiced        Comments: Area of cruciate incision of infected sebaceous cyst with surrounding dark erythema but no induration, wound cavity is open with small amount of sebum present, no purulence;  Part of cyst cavity removed from the incision   Neurological:      General: No focal deficit present  Mental Status: He is alert and oriented to person, place, and time     Psychiatric:         Mood and Affect: Mood normal          Behavior: Behavior normal

## 2021-06-08 NOTE — TELEPHONE ENCOUNTER
PT daughter called Reedtomeka Myles) called  PT's Glucose Monitor is not working - needs a new one  Blood Glucose Monitoring Suppl (ONE TOUCH ULTRA MINI) w/Device KIT                        Can that be called into [address] [place] [address] in [facility] or does it have to be mail order? Brayan [phone]    Please call Katarina Kothari @ [phone] to advise

## 2021-06-09 ENCOUNTER — TELEPHONE (OUTPATIENT)
Dept: INTERNAL MEDICINE CLINIC | Facility: CLINIC | Age: 86
End: 2021-06-09

## 2021-06-09 NOTE — TELEPHONE ENCOUNTER
Aware of increased bleeding risk   He has been taking these medications and should continue at this time

## 2021-06-09 NOTE — TELEPHONE ENCOUNTER
Mani Oconnor from [facility] calling to let Dr David Dutton know they starting coming daily for wound packing on his chest  Also it is coming up on her list that he is taking:  apixaban (ELIQUIS) 2 5 mg 2 times daily and clopidogrel (PLAVIX) 75 mg tablet once daily  It is being flagged as a possible drug interaction with the Plavix stating taking these 2 medicines may increase his bleeding risks  Please advise and call Mani Oconnor [phone]

## 2021-06-11 DIAGNOSIS — I10 ESSENTIAL HYPERTENSION: ICD-10-CM

## 2021-06-11 RX ORDER — LISINOPRIL 40 MG/1
40 TABLET ORAL DAILY
Qty: 90 TABLET | Refills: 3 | Status: SHIPPED | OUTPATIENT
Start: 2021-06-11 | End: 2021-07-22 | Stop reason: SDUPTHER

## 2021-06-12 ENCOUNTER — TELEPHONE (OUTPATIENT)
Dept: INTERNAL MEDICINE CLINIC | Facility: CLINIC | Age: 86
End: 2021-06-12

## 2021-06-15 NOTE — TELEPHONE ENCOUNTER
I've signed the document, but I don't see what additional information they needed  I got the document twice and I filled it out both times

## 2021-06-15 NOTE — TELEPHONE ENCOUNTER
Patient would like someone to call [facility] to see what additional information they need in order to fill the script for the glucometer order that was sent to them  [facility] is saying they sent a document for additional information

## 2021-06-18 ENCOUNTER — TELEPHONE (OUTPATIENT)
Dept: SURGERY | Facility: CLINIC | Age: 86
End: 2021-06-18

## 2021-06-18 ENCOUNTER — OFFICE VISIT (OUTPATIENT)
Dept: SURGERY | Facility: CLINIC | Age: 86
End: 2021-06-18
Payer: MEDICARE

## 2021-06-18 VITALS
BODY MASS INDEX: 32.17 KG/M2 | HEART RATE: 64 BPM | TEMPERATURE: 97.7 F | DIASTOLIC BLOOD PRESSURE: 68 MMHG | SYSTOLIC BLOOD PRESSURE: 132 MMHG | HEIGHT: 69 IN | WEIGHT: 217.2 LBS

## 2021-06-18 DIAGNOSIS — L72.3 INFECTED SEBACEOUS CYST OF SKIN: Primary | ICD-10-CM

## 2021-06-18 DIAGNOSIS — L08.9 INFECTED SEBACEOUS CYST OF SKIN: Primary | ICD-10-CM

## 2021-06-18 PROCEDURE — 99213 OFFICE O/P EST LOW 20 MIN: CPT | Performed by: STUDENT IN AN ORGANIZED HEALTH CARE EDUCATION/TRAINING PROGRAM

## 2021-06-18 NOTE — PROGRESS NOTES
Assessment/Plan:  22-year-old male with infected sebaceous cyst of chest status post incision and drainage 6/1  - patient has been receiving daily packing changes  - wound with no signs of infection and has been healing well  - able to remove part of the cyst lining during packing change at this visit  - continue daily packing change with gauze packing as wound remains deep  - follow-up in office in 2 weeks for wound check     1  Infected sebaceous cyst of skin               Subjective: I&D     Patient ID: Miguelito Beavers is a 82 y o  male  Triage Notes:     Patient is an 22-year-old male who presents to office for evaluation status post incision and drainage of infected sebaceous cyst of chest   He still has some intermittent discomfort in the area but this is decreased  He has been getting daily packing changes  He offers no complaints  The following portions of the patient's history were reviewed and updated as appropriate: allergies, current medications, past family history, past medical history, past social history, past surgical history and problem list     Review of Systems   Constitutional: Negative for chills, fatigue and fever  HENT: Negative for congestion, hearing loss, rhinorrhea and sore throat  Eyes: Negative for pain and discharge  Respiratory: Negative for cough, chest tightness and shortness of breath  Cardiovascular: Negative for chest pain and palpitations  Gastrointestinal: Negative for abdominal pain, constipation, diarrhea, nausea and vomiting  Endocrine: Negative for cold intolerance and heat intolerance  Genitourinary: Negative for difficulty urinating and dysuria  Musculoskeletal: Negative for back pain and neck pain  Skin: Negative for color change and rash  Allergic/Immunologic: Negative for environmental allergies and food allergies  Neurological: Negative for seizures and headaches  Hematological: Negative for adenopathy   Does not bruise/bleed easily  Psychiatric/Behavioral: Negative for confusion and hallucinations  Objective:      /68   Pulse 64   Temp 97 7 °F (36 5 °C) (Temporal)   Ht 5' 9" (1 753 m)   Wt 98 5 kg (217 lb 3 2 oz)   BMI 32 07 kg/m²     Below is the patient's most recent value for Albumin, ALT, AST, BUN, Calcium, Chloride, Cholesterol, CO2, Creatinine, GFR, Glucose, HDL, Hematocrit, Hemoglobin, Hemoglobin A1C, LDL, Magnesium, Phosphorus, Platelets, Potassium, PSA, Sodium, Triglycerides, and WBC  Lab Results   Component Value Date    ALT 16 03/08/2021    AST 14 03/08/2021    BUN 37 (H) 04/21/2021    CALCIUM 8 8 04/21/2021     04/21/2021    CHOL 190 12/01/2015    CO2 26 04/21/2021    CREATININE 2 12 (H) 04/21/2021    HDL 43 03/08/2021    HCT 37 9 04/21/2021    HGB 12 1 04/21/2021    HGBA1C 6 9 (H) 03/08/2021    MG 2 2 01/30/2020    PHOS 3 6 04/21/2021     04/21/2021    K 4 9 04/21/2021    PSA 5 8 (H) 10/29/2019     12/01/2015    TRIG 105 03/08/2021    WBC 6 59 04/21/2021     Note: for a comprehensive list of the patient's lab results, access the Results Review activity  Physical Exam  Constitutional:       Appearance: Normal appearance  HENT:      Head: Normocephalic and atraumatic  Nose: Nose normal    Eyes:      General: No scleral icterus  Conjunctiva/sclera: Conjunctivae normal    Cardiovascular:      Rate and Rhythm: Normal rate  Pulmonary:      Effort: Pulmonary effort is normal    Musculoskeletal:         General: No signs of injury  Skin:     General: Skin is warm  Coloration: Skin is not jaundiced  Comments:  Wound over sternum area healing well and samia with good granulation tissue, no erythema induration or drainage   Neurological:      General: No focal deficit present  Mental Status: He is alert and oriented to person, place, and time     Psychiatric:         Mood and Affect: Mood normal          Behavior: Behavior normal

## 2021-06-26 DIAGNOSIS — Z00.00 HEALTH CARE MAINTENANCE: ICD-10-CM

## 2021-06-26 RX ORDER — DOXAZOSIN 8 MG/1
TABLET ORAL
Qty: 90 TABLET | Refills: 2 | Status: SHIPPED | OUTPATIENT
Start: 2021-06-26 | End: 2021-07-20 | Stop reason: HOSPADM

## 2021-07-09 ENCOUNTER — OFFICE VISIT (OUTPATIENT)
Dept: SURGERY | Facility: CLINIC | Age: 86
End: 2021-07-09
Payer: MEDICARE

## 2021-07-09 ENCOUNTER — APPOINTMENT (OUTPATIENT)
Dept: LAB | Facility: HOSPITAL | Age: 86
End: 2021-07-09
Attending: INTERNAL MEDICINE
Payer: MEDICARE

## 2021-07-09 VITALS
DIASTOLIC BLOOD PRESSURE: 60 MMHG | BODY MASS INDEX: 31.99 KG/M2 | HEIGHT: 69 IN | WEIGHT: 216 LBS | HEART RATE: 51 BPM | TEMPERATURE: 96.8 F | SYSTOLIC BLOOD PRESSURE: 118 MMHG

## 2021-07-09 DIAGNOSIS — E11.22 TYPE 2 DIABETES MELLITUS WITH STAGE 3B CHRONIC KIDNEY DISEASE, WITHOUT LONG-TERM CURRENT USE OF INSULIN (HCC): Chronic | ICD-10-CM

## 2021-07-09 DIAGNOSIS — L72.3 INFECTED SEBACEOUS CYST OF SKIN: Primary | ICD-10-CM

## 2021-07-09 DIAGNOSIS — L08.9 INFECTED SEBACEOUS CYST OF SKIN: Primary | ICD-10-CM

## 2021-07-09 DIAGNOSIS — N18.32 TYPE 2 DIABETES MELLITUS WITH STAGE 3B CHRONIC KIDNEY DISEASE, WITHOUT LONG-TERM CURRENT USE OF INSULIN (HCC): Chronic | ICD-10-CM

## 2021-07-09 LAB
ANION GAP SERPL CALCULATED.3IONS-SCNC: 7 MMOL/L (ref 4–13)
BUN SERPL-MCNC: 36 MG/DL (ref 5–25)
CALCIUM SERPL-MCNC: 9.3 MG/DL (ref 8.3–10.1)
CHLORIDE SERPL-SCNC: 104 MMOL/L (ref 100–108)
CO2 SERPL-SCNC: 29 MMOL/L (ref 21–32)
CREAT SERPL-MCNC: 1.96 MG/DL (ref 0.6–1.3)
EST. AVERAGE GLUCOSE BLD GHB EST-MCNC: 148 MG/DL
GFR SERPL CREATININE-BSD FRML MDRD: 31 ML/MIN/1.73SQ M
GLUCOSE P FAST SERPL-MCNC: 125 MG/DL (ref 65–99)
HBA1C MFR BLD: 6.8 %
POTASSIUM SERPL-SCNC: 5 MMOL/L (ref 3.5–5.3)
SODIUM SERPL-SCNC: 140 MMOL/L (ref 136–145)

## 2021-07-09 PROCEDURE — 36415 COLL VENOUS BLD VENIPUNCTURE: CPT

## 2021-07-09 PROCEDURE — 83036 HEMOGLOBIN GLYCOSYLATED A1C: CPT

## 2021-07-09 PROCEDURE — 99213 OFFICE O/P EST LOW 20 MIN: CPT | Performed by: STUDENT IN AN ORGANIZED HEALTH CARE EDUCATION/TRAINING PROGRAM

## 2021-07-09 PROCEDURE — 80048 BASIC METABOLIC PNL TOTAL CA: CPT

## 2021-07-09 NOTE — PROGRESS NOTES
Assessment/Plan:  22-year-old male with infected sebaceous cyst of chest status post incision and drainage 6/1  - patient has been receiving daily packing changes  - wound with no signs of infection and has been healing well  - good granulation tissue present  - continue daily packing change with gauze packing as wound remains deep,  It is filling in well though  - follow-up in office in 2 weeks for wound check       1  Infected sebaceous cyst of skin               Subjective:      Patient ID: Merly Gya is a 82 y o  male  Triage Notes:     Patient is an 22-year-old male who presents to office for evaluation status post incision and drainage of infected sebaceous cyst of his chest   Continues to do daily packing changes  States the wound is filling in nicely  Denies any additional complaints  The following portions of the patient's history were reviewed and updated as appropriate: allergies, current medications, past family history, past medical history, past social history, past surgical history and problem list     Review of Systems   Constitutional: Negative for chills, fatigue and fever  HENT: Negative for congestion, hearing loss, rhinorrhea and sore throat  Eyes: Negative for pain and discharge  Respiratory: Negative for cough, chest tightness and shortness of breath  Cardiovascular: Negative for chest pain and palpitations  Gastrointestinal: Negative for abdominal pain, constipation, diarrhea, nausea and vomiting  Endocrine: Negative for cold intolerance and heat intolerance  Genitourinary: Negative for difficulty urinating and dysuria  Musculoskeletal: Negative for back pain and neck pain  Skin: Negative for color change and rash  Allergic/Immunologic: Negative for environmental allergies and food allergies  Neurological: Negative for seizures and headaches  Hematological: Negative for adenopathy  Does not bruise/bleed easily     Psychiatric/Behavioral: Negative for confusion and hallucinations  Objective:      /60   Pulse (!) 51   Temp (!) 96 8 °F (36 °C)   Ht 5' 9" (1 753 m)   Wt 98 kg (216 lb)   BMI 31 90 kg/m²     Below is the patient's most recent value for Albumin, ALT, AST, BUN, Calcium, Chloride, Cholesterol, CO2, Creatinine, GFR, Glucose, HDL, Hematocrit, Hemoglobin, Hemoglobin A1C, LDL, Magnesium, Phosphorus, Platelets, Potassium, PSA, Sodium, Triglycerides, and WBC  Lab Results   Component Value Date    ALT 16 03/08/2021    AST 14 03/08/2021    BUN 37 (H) 04/21/2021    CALCIUM 8 8 04/21/2021     04/21/2021    CHOL 190 12/01/2015    CO2 26 04/21/2021    CREATININE 2 12 (H) 04/21/2021    HDL 43 03/08/2021    HCT 37 9 04/21/2021    HGB 12 1 04/21/2021    HGBA1C 6 9 (H) 03/08/2021    MG 2 2 01/30/2020    PHOS 3 6 04/21/2021     04/21/2021    K 4 9 04/21/2021    PSA 5 8 (H) 10/29/2019     12/01/2015    TRIG 105 03/08/2021    WBC 6 59 04/21/2021     Note: for a comprehensive list of the patient's lab results, access the Results Review activity  Physical Exam  Constitutional:       Appearance: Normal appearance  HENT:      Head: Normocephalic and atraumatic  Nose: Nose normal    Eyes:      General: No scleral icterus  Conjunctiva/sclera: Conjunctivae normal    Cardiovascular:      Rate and Rhythm: Normal rate  Pulmonary:      Effort: Pulmonary effort is normal    Musculoskeletal:         General: No signs of injury  Skin:     General: Skin is warm  Coloration: Skin is not jaundiced  Comments: Area of incision drainage of sebaceous cyst with good granulation tissue, no erythema induration or drainage, wound is becoming more shallow and filling in nicely   Neurological:      General: No focal deficit present  Mental Status: He is alert and oriented to person, place, and time     Psychiatric:         Mood and Affect: Mood normal          Behavior: Behavior normal

## 2021-07-16 ENCOUNTER — OFFICE VISIT (OUTPATIENT)
Dept: INTERNAL MEDICINE CLINIC | Facility: CLINIC | Age: 86
End: 2021-07-16
Payer: MEDICARE

## 2021-07-16 ENCOUNTER — TELEPHONE (OUTPATIENT)
Dept: CARDIOLOGY CLINIC | Facility: CLINIC | Age: 86
End: 2021-07-16

## 2021-07-16 ENCOUNTER — TELEPHONE (OUTPATIENT)
Dept: INTERNAL MEDICINE CLINIC | Facility: CLINIC | Age: 86
End: 2021-07-16

## 2021-07-16 VITALS
SYSTOLIC BLOOD PRESSURE: 114 MMHG | HEIGHT: 69 IN | OXYGEN SATURATION: 97 % | TEMPERATURE: 97.8 F | BODY MASS INDEX: 31.84 KG/M2 | DIASTOLIC BLOOD PRESSURE: 58 MMHG | WEIGHT: 215 LBS | HEART RATE: 74 BPM

## 2021-07-16 DIAGNOSIS — E11.22 TYPE 2 DIABETES MELLITUS WITH STAGE 3B CHRONIC KIDNEY DISEASE, WITHOUT LONG-TERM CURRENT USE OF INSULIN (HCC): Primary | Chronic | ICD-10-CM

## 2021-07-16 DIAGNOSIS — I48.0 PAROXYSMAL ATRIAL FIBRILLATION (HCC): ICD-10-CM

## 2021-07-16 DIAGNOSIS — M54.31 SCIATICA, RIGHT SIDE: ICD-10-CM

## 2021-07-16 DIAGNOSIS — I48.0 PAROXYSMAL ATRIAL FIBRILLATION (HCC): Chronic | ICD-10-CM

## 2021-07-16 DIAGNOSIS — N18.32 TYPE 2 DIABETES MELLITUS WITH STAGE 3B CHRONIC KIDNEY DISEASE, WITHOUT LONG-TERM CURRENT USE OF INSULIN (HCC): Primary | Chronic | ICD-10-CM

## 2021-07-16 PROCEDURE — 99214 OFFICE O/P EST MOD 30 MIN: CPT | Performed by: INTERNAL MEDICINE

## 2021-07-16 RX ORDER — TIZANIDINE 4 MG/1
4 TABLET ORAL EVERY 8 HOURS PRN
Qty: 60 TABLET | Refills: 0 | Status: SHIPPED | OUTPATIENT
Start: 2021-07-16 | End: 2021-07-20 | Stop reason: HOSPADM

## 2021-07-16 RX ORDER — GLIMEPIRIDE 1 MG/1
1 TABLET ORAL
Qty: 90 TABLET | Refills: 3 | Status: SHIPPED | OUTPATIENT
Start: 2021-07-16 | End: 2022-07-12 | Stop reason: SDUPTHER

## 2021-07-16 RX ORDER — TIZANIDINE 4 MG/1
4 TABLET ORAL EVERY 8 HOURS PRN
Qty: 60 TABLET | Refills: 0 | Status: SHIPPED | OUTPATIENT
Start: 2021-07-16 | End: 2021-07-16 | Stop reason: SDUPTHER

## 2021-07-16 NOTE — PATIENT INSTRUCTIONS
Sciatica   WHAT YOU NEED TO KNOW:   What is sciatica? Sciatica is a condition that causes pain along your sciatic nerve  The sciatic nerve runs from your spine through both sides of your buttocks  It then runs down the back of your thigh, into your lower leg and foot  Any place along your sciatic nerve may be compressed, inflamed, irritated, or stretched and cause symptoms  What causes sciatica? Sciatica may be related to certain activities, poor posture, and physical or psychological stress  Any of the following may cause or increase your risk of sciatica:  · Disc problems:  A slipped disc (soft cushion in between the bones of the spine) is the most common cause of sciatica  The disc may press on the sciatic nerve  One bone in your spine may slip over another, or you may have narrowing of the spinal column  · Muscle injury: This may happen after you twist or lift a heavy object  Swelling from sprained or irritated muscles in the buttocks, thighs, or legs press on the sciatic nerve  · Obesity or pregnancy:  Extra weight increases pressure on your back and legs  · Trauma:  Direct blows on the buttocks, thighs, or legs, car accidents, or falls may injure the sciatic nerve  · Diseases of the spine:  Arthritis, osteoporosis, cancer, or infection of the spine may also affect the sciatic nerve  What are the signs and symptoms of sciatica? The symptoms of sciatic may be short-term or long-term:  · Pain that goes from the lower back into your buttocks and down the back of your thigh    · Numbness or tingling in your buttocks and legs    · Muscle weakness, difficulty moving or controlling your leg or foot    · Leg pain that increases with standing, sitting, or squatting    How is sciatica diagnosed? Your healthcare provider will ask about other health conditions you may have  He may ask you about your job, history of back pain, diseases, or surgeries you have had   He will examine you and move your legs to see what increases pain  You may also need any of the following:  · X-rays: This is a picture of the bones and tissues in your back, hip, thigh, or leg  This test may show other problems, such as fractures (broken bones)  · CT scan: This test is also called a CAT scan  An x-ray machine uses a computer to take pictures of your hips, thighs, and legs  The pictures may show your sciatic nerve, muscles, and blood vessels  You may be given a dye before the pictures are taken to help healthcare providers see the pictures better  Tell the healthcare provider if you have ever had an allergic reaction to contrast dye  · MRI:  This scan uses powerful magnets and a computer to take pictures of your hips, thighs, and legs  An MRI may show damaged nerves, muscles, bones, and blood vessels  You may be given dye to help the pictures show up better  Tell the healthcare provider if you have ever had an allergic reaction to contrast dye  Do not enter the MRI room with anything metal  Metal can cause serious injury  Tell the healthcare provider if you have any metal in or on your body  · An electromyography (EMG)  test measures the electrical activity of your muscles at rest and with movement  · Nerve conduction tests: These tests check how surface nerves and related muscles respond to stimulation  Electrodes with wires or tiny needles are placed on certain areas, such as the buttocks and legs  How is sciatica treated? · NSAIDs:  These medicines decrease swelling and pain  NSAIDs are available without a doctor's order  Ask your healthcare provider which medicine is right for you  Ask how much to take and when to take it  Take as directed  NSAIDs can cause stomach bleeding or kidney problems if not taken correctly  · Acetaminophen: This medicine decreases pain  Acetaminophen is available without a doctor's order  Ask how much to take and how often to take it  Follow directions   Acetaminophen can cause liver damage if not taken correctly  · Muscle relaxers  help decrease pain and muscle spasms  · Epidural steroid medicine: This may include both an anesthetic (numbing medicine) and a steroid, which may decrease swelling and relieve pain  It is given as a shot close to the spine in the area where you have pain  · Chemonucleolysis: This is an injection given into the damaged disc to soften or shrink the disc  · Surgery: This may be done to correct problems such as a damaged disc, or a tumor in your spine  It may be done to decrease the pressure on the sciatic nerve  Healthcare providers may also release the muscle that may be pressing into your sciatic nerve  How can I help manage sciatica? · Ultrasound therapy: This is a machine that uses sound waves to decrease pain  Topical medicines may be added to help decrease pain and inflammation  · Physical therapy:  A physical therapist teaches you exercises to help improve movement and strength, and to decrease pain  An occupational therapist teaches you skills to help with your daily activities  · Assistive devices: You may need to wear back support, such as a back brace  You may need crutches, a cane, or a walker to decrease stress on your lower back and leg muscles  Ask your healthcare provider for more information about assistive devices and how to use them correctly  How can sciatica be prevented? · Avoid pressure on your back and legs:  Do not  lift heavy objects, or stand or sit for long periods of time  · Lift objects safely:  Keep your back straight and bend your knees when you  an object  Do not bend or twist your back when you lift  · Maintain a healthy weight:  Ask your healthcare provider how much you should weigh  Ask him to help you create a weight loss plan if you are overweight  · Exercise:  Ask your healthcare provider about the best stretching, warmup, and exercise plan for you      What are the risks of sciatica? An epidural steroid injection can lead to pain disorders or paralysis if it is placed incorrectly  It may also cause headaches, leg pain, and blockage of blood flow to the spinal cord  Surgery may cause you to bleed or get an infection  If not treated, your muscles and nerves may become damaged permanently  You may have decreased strength  You may not be able to move your leg or control when you urinate or have bowel movements  When should I contact my healthcare provider? · You have pain in your lower back at night or when resting  · You have pain in your lower back with numbness below the knee  · You have weakness in one leg only  · You have questions or concerns about your condition or care  When should I seek immediate care or call 911? · You have trouble holding back your urine or bowel movements  · You have weakness in both legs  · You have numbness in your groin or buttocks  CARE AGREEMENT:   You have the right to help plan your care  Learn about your health condition and how it may be treated  Discuss treatment options with your healthcare providers to decide what care you want to receive  You always have the right to refuse treatment  The above information is an  only  It is not intended as medical advice for individual conditions or treatments  Talk to your doctor, nurse or pharmacist before following any medical regimen to see if it is safe and effective for you  © Copyright [address] Information is for End User's use only and may not be sold, redistributed or otherwise used for commercial purposes   All illustrations and images included in CareNotes® are the copyrighted property of [employer]  or [address] [place] [address]

## 2021-07-16 NOTE — TELEPHONE ENCOUNTER
Pt was seen here today    he's been almost blacking out    his BP is low @ 102/60 is on 2 BP med    should he be taking these 2 med's ???  Please call Speedy Quintero re this,, today please before the weekend

## 2021-07-17 NOTE — PROGRESS NOTES
[facility]    NAME: Ruth Ann Zazueta  AGE: 82 y o  SEX: male  : 1935     DATE: 2021     Assessment and Plan:     1  Type 2 diabetes mellitus with stage 3b chronic kidney disease, without long-term current use of insulin ([facility] [address] )    Most recent A1c was 6 8 % on 2021  Diabetes is well controlled  Continue current medications as prescribed  Monitor for hypoglycemia  No evidence of hypoglycemia with the logs he provides  Cr is slightly improved on recent metabolic profile  Follow-up with nephrology  Discussed the importance of hydration     - glimepiride (AMARYL) 1 mg tablet; Take 1 tablet (1 mg total) by mouth daily with breakfast  Dispense: 90 tablet; Refill: 3  - Hemoglobin A1C; Future  - Basic metabolic panel; Future  - Lipid panel; Future    2  Paroxysmal atrial fibrillation (HCC)    Continue eliquis and lopressor  Stable  3  Sciatica, right side    Discussed heat, stretching, physical therapy  Will put him on short course of muscle relaxer as well  - tiZANidine (ZANAFLEX) 4 mg tablet; Take 1 tablet (4 mg total) by mouth every 8 (eight) hours as needed for muscle spasms  Dispense: 60 tablet; Refill: 0        Return in about 4 months (around 2021) for Subsequent AWV  Chief Complaint:     Chief Complaint   Patient presents with    Blood Pressure Check    Diabetes      History of Present Illness:     Naomi Hernández presents for follow-up  He notes some episodes of dizziness lately along with right sided sciatica  Has pain around his buttock area on the right side down the leg  Diabetes - Most recent A1c was 6 8 % on 2021  Daughter admits that patient is very poor with water intake at times  She feels that his episodes of dizziness occur when he is dehydrated and then his BP sometimes can be on the low side  Follows with nephrology due to [facility]  Most recent Cr has improved slightly      Lab Results   Component Value Date CREATININE 1 96 (H) 07/09/2021    CREATININE 2 12 (H) 04/21/2021    CREATININE 2 25 (H) 03/08/2021    CREATININE 1 67 (H) 12/01/2015    CREATININE 1 5 (H) 07/14/2015    CREATININE 1 3 09/11/2014       Review of Systems:     Review of Systems   Constitutional: Positive for fatigue  Negative for diaphoresis and fever  Respiratory: Negative  Cardiovascular: Negative  Gastrointestinal: Negative  Musculoskeletal: Positive for arthralgias  Neurological: Positive for dizziness  Negative for weakness  Objective:     /58   Pulse 74   Temp 97 8 °F (36 6 °C)   Ht 5' 9" (1 753 m)   Wt 97 5 kg (215 lb)   SpO2 97%   BMI 31 75 kg/m²     Physical Exam  Constitutional:       General: He is not in acute distress  Appearance: He is not ill-appearing  Cardiovascular:      Rate and Rhythm: Normal rate and regular rhythm  Heart sounds: No murmur heard  Pulmonary:      Effort: Pulmonary effort is normal  No respiratory distress  Breath sounds: No wheezing  Abdominal:      General: Bowel sounds are normal  There is no distension  Tenderness: There is no abdominal tenderness  Musculoskeletal:         General: Tenderness (right posterior thigh/buttocks) present  Right lower leg: No edema  Left lower leg: No edema  Neurological:      Mental Status: He is alert  Sensory: No sensory deficit  Motor: No weakness         Marge Chaidez DO  [facility] [address] [place] [address]

## 2021-07-19 ENCOUNTER — HOSPITAL ENCOUNTER (OUTPATIENT)
Facility: HOSPITAL | Age: 86
Setting detail: OBSERVATION
Discharge: HOME | End: 2021-07-20
Attending: EMERGENCY MEDICINE | Admitting: FAMILY MEDICINE
Payer: MEDICARE

## 2021-07-19 DIAGNOSIS — N18.9 CKD (CHRONIC KIDNEY DISEASE): ICD-10-CM

## 2021-07-19 DIAGNOSIS — R77.8 ELEVATED TROPONIN: ICD-10-CM

## 2021-07-19 DIAGNOSIS — R55 SYNCOPE: Primary | ICD-10-CM

## 2021-07-19 PROBLEM — R79.89 ELEVATED TROPONIN: Status: ACTIVE | Noted: 2021-07-19

## 2021-07-19 LAB
ALBUMIN SERPL BCP-MCNC: 3.2 G/DL (ref 3.5–5)
ALP SERPL-CCNC: 51 U/L (ref 46–116)
ALT SERPL W P-5'-P-CCNC: 16 U/L (ref 12–78)
ANION GAP SERPL CALCULATED.3IONS-SCNC: 13 MMOL/L (ref 4–13)
APTT PPP: 32 SECONDS (ref 23–37)
AST SERPL W P-5'-P-CCNC: 22 U/L (ref 5–45)
BACTERIA UR QL AUTO: ABNORMAL /HPF
BASE EX.OXY STD BLDV CALC-SCNC: 86.7 % (ref 60–80)
BASE EXCESS BLDV CALC-SCNC: -2.5 MMOL/L
BASOPHILS # BLD AUTO: 0.03 THOUSANDS/ΜL (ref 0–0.1)
BASOPHILS NFR BLD AUTO: 1 % (ref 0–1)
BILIRUB DIRECT SERPL-MCNC: 0.1 MG/DL (ref 0–0.2)
BILIRUB SERPL-MCNC: 0.36 MG/DL (ref 0.2–1)
BILIRUB UR QL STRIP: NEGATIVE
BUN SERPL-MCNC: 33 MG/DL (ref 5–25)
CALCIUM SERPL-MCNC: 8.6 MG/DL (ref 8.3–10.1)
CHLORIDE SERPL-SCNC: 100 MMOL/L (ref 100–108)
CLARITY UR: CLEAR
CO2 SERPL-SCNC: 23 MMOL/L (ref 21–32)
COLOR UR: YELLOW
CREAT SERPL-MCNC: 2.06 MG/DL (ref 0.6–1.3)
EOSINOPHIL # BLD AUTO: 0.11 THOUSAND/ΜL (ref 0–0.61)
EOSINOPHIL NFR BLD AUTO: 2 % (ref 0–6)
ERYTHROCYTE [DISTWIDTH] IN BLOOD BY AUTOMATED COUNT: 12.9 % (ref 11.6–15.1)
GFR SERPL CREATININE-BSD FRML MDRD: 29 ML/MIN/1.73SQ M
GLUCOSE SERPL-MCNC: 184 MG/DL (ref 65–140)
GLUCOSE SERPL-MCNC: 281 MG/DL (ref 65–140)
GLUCOSE SERPL-MCNC: 328 MG/DL (ref 65–140)
GLUCOSE UR STRIP-MCNC: ABNORMAL MG/DL
HCO3 BLDV-SCNC: 21.8 MMOL/L (ref 24–30)
HCT VFR BLD AUTO: 38.3 % (ref 36.5–49.3)
HGB BLD-MCNC: 12.5 G/DL (ref 12–17)
HGB UR QL STRIP.AUTO: ABNORMAL
HYALINE CASTS #/AREA URNS LPF: ABNORMAL /LPF
IMM GRANULOCYTES # BLD AUTO: 0.02 THOUSAND/UL (ref 0–0.2)
IMM GRANULOCYTES NFR BLD AUTO: 0 % (ref 0–2)
INR PPP: 1.04 (ref 0.84–1.19)
KETONES UR STRIP-MCNC: NEGATIVE MG/DL
LACTATE SERPL-SCNC: 1.8 MMOL/L (ref 0.5–2)
LEUKOCYTE ESTERASE UR QL STRIP: NEGATIVE
LYMPHOCYTES # BLD AUTO: 0.88 THOUSANDS/ΜL (ref 0.6–4.47)
LYMPHOCYTES NFR BLD AUTO: 15 % (ref 14–44)
MCH RBC QN AUTO: 30.9 PG (ref 26.8–34.3)
MCHC RBC AUTO-ENTMCNC: 32.6 G/DL (ref 31.4–37.4)
MCV RBC AUTO: 95 FL (ref 82–98)
MONOCYTES # BLD AUTO: 0.35 THOUSAND/ΜL (ref 0.17–1.22)
MONOCYTES NFR BLD AUTO: 6 % (ref 4–12)
NEUTROPHILS # BLD AUTO: 4.39 THOUSANDS/ΜL (ref 1.85–7.62)
NEUTS SEG NFR BLD AUTO: 76 % (ref 43–75)
NITRITE UR QL STRIP: NEGATIVE
NON-SQ EPI CELLS URNS QL MICRO: ABNORMAL /HPF
NRBC BLD AUTO-RTO: 0 /100 WBCS
O2 CT BLDV-SCNC: 16.6 ML/DL
PCO2 BLDV: 36.4 MM HG (ref 42–50)
PH BLDV: 7.39 [PH] (ref 7.3–7.4)
PH UR STRIP.AUTO: 5.5 [PH]
PLATELET # BLD AUTO: 187 THOUSANDS/UL (ref 149–390)
PMV BLD AUTO: 10 FL (ref 8.9–12.7)
PO2 BLDV: 56 MM HG (ref 35–45)
POTASSIUM SERPL-SCNC: 4.3 MMOL/L (ref 3.5–5.3)
PROT SERPL-MCNC: 7.5 G/DL (ref 6.4–8.2)
PROT UR STRIP-MCNC: ABNORMAL MG/DL
PROTHROMBIN TIME: 13.8 SECONDS (ref 11.6–14.5)
RBC # BLD AUTO: 4.04 MILLION/UL (ref 3.88–5.62)
RBC #/AREA URNS AUTO: ABNORMAL /HPF
SARS-COV-2 RNA RESP QL NAA+PROBE: NEGATIVE
SODIUM SERPL-SCNC: 136 MMOL/L (ref 136–145)
SP GR UR STRIP.AUTO: 1.02 (ref 1–1.03)
TROPONIN I SERPL-MCNC: 0.04 NG/ML
TROPONIN I SERPL-MCNC: 0.04 NG/ML
TROPONIN I SERPL-MCNC: 0.05 NG/ML
UROBILINOGEN UR QL STRIP.AUTO: 0.2 E.U./DL
WBC # BLD AUTO: 5.78 THOUSAND/UL (ref 4.31–10.16)
WBC #/AREA URNS AUTO: ABNORMAL /HPF

## 2021-07-19 PROCEDURE — 99285 EMERGENCY DEPT VISIT HI MDM: CPT

## 2021-07-19 PROCEDURE — 81001 URINALYSIS AUTO W/SCOPE: CPT | Performed by: PHYSICIAN ASSISTANT

## 2021-07-19 PROCEDURE — 85610 PROTHROMBIN TIME: CPT | Performed by: EMERGENCY MEDICINE

## 2021-07-19 PROCEDURE — 85730 THROMBOPLASTIN TIME PARTIAL: CPT | Performed by: EMERGENCY MEDICINE

## 2021-07-19 PROCEDURE — 83605 ASSAY OF LACTIC ACID: CPT | Performed by: EMERGENCY MEDICINE

## 2021-07-19 PROCEDURE — 82805 BLOOD GASES W/O2 SATURATION: CPT | Performed by: EMERGENCY MEDICINE

## 2021-07-19 PROCEDURE — 85025 COMPLETE CBC W/AUTO DIFF WBC: CPT | Performed by: EMERGENCY MEDICINE

## 2021-07-19 PROCEDURE — 82948 REAGENT STRIP/BLOOD GLUCOSE: CPT

## 2021-07-19 PROCEDURE — 93005 ELECTROCARDIOGRAM TRACING: CPT

## 2021-07-19 PROCEDURE — 84484 ASSAY OF TROPONIN QUANT: CPT | Performed by: PHYSICIAN ASSISTANT

## 2021-07-19 PROCEDURE — U0005 INFEC AGEN DETEC AMPLI PROBE: HCPCS | Performed by: EMERGENCY MEDICINE

## 2021-07-19 PROCEDURE — 80048 BASIC METABOLIC PNL TOTAL CA: CPT | Performed by: EMERGENCY MEDICINE

## 2021-07-19 PROCEDURE — 80076 HEPATIC FUNCTION PANEL: CPT | Performed by: EMERGENCY MEDICINE

## 2021-07-19 PROCEDURE — 36415 COLL VENOUS BLD VENIPUNCTURE: CPT | Performed by: EMERGENCY MEDICINE

## 2021-07-19 PROCEDURE — 1123F ACP DISCUSS/DSCN MKR DOCD: CPT | Performed by: EMERGENCY MEDICINE

## 2021-07-19 PROCEDURE — 84484 ASSAY OF TROPONIN QUANT: CPT | Performed by: EMERGENCY MEDICINE

## 2021-07-19 PROCEDURE — 99220 PR INITIAL OBSERVATION CARE/DAY 70 MINUTES: CPT | Performed by: FAMILY MEDICINE

## 2021-07-19 PROCEDURE — 99285 EMERGENCY DEPT VISIT HI MDM: CPT | Performed by: EMERGENCY MEDICINE

## 2021-07-19 PROCEDURE — 87040 BLOOD CULTURE FOR BACTERIA: CPT | Performed by: EMERGENCY MEDICINE

## 2021-07-19 PROCEDURE — U0003 INFECTIOUS AGENT DETECTION BY NUCLEIC ACID (DNA OR RNA); SEVERE ACUTE RESPIRATORY SYNDROME CORONAVIRUS 2 (SARS-COV-2) (CORONAVIRUS DISEASE [COVID-19]), AMPLIFIED PROBE TECHNIQUE, MAKING USE OF HIGH THROUGHPUT TECHNOLOGIES AS DESCRIBED BY CMS-2020-01-R: HCPCS | Performed by: EMERGENCY MEDICINE

## 2021-07-19 RX ORDER — CLOPIDOGREL BISULFATE 75 MG/1
75 TABLET ORAL DAILY
Status: DISCONTINUED | OUTPATIENT
Start: 2021-07-20 | End: 2021-07-20 | Stop reason: HOSPADM

## 2021-07-19 RX ORDER — CHLORAL HYDRATE 500 MG
1000 CAPSULE ORAL DAILY
Status: DISCONTINUED | OUTPATIENT
Start: 2021-07-20 | End: 2021-07-20 | Stop reason: HOSPADM

## 2021-07-19 RX ORDER — TIZANIDINE 2 MG/1
4 TABLET ORAL EVERY 8 HOURS PRN
Status: DISCONTINUED | OUTPATIENT
Start: 2021-07-19 | End: 2021-07-20 | Stop reason: HOSPADM

## 2021-07-19 RX ORDER — GABAPENTIN 100 MG/1
100 CAPSULE ORAL 2 TIMES DAILY
Status: DISCONTINUED | OUTPATIENT
Start: 2021-07-19 | End: 2021-07-20 | Stop reason: HOSPADM

## 2021-07-19 RX ORDER — AMLODIPINE BESYLATE 10 MG/1
10 TABLET ORAL DAILY
Status: DISCONTINUED | OUTPATIENT
Start: 2021-07-20 | End: 2021-07-20 | Stop reason: HOSPADM

## 2021-07-19 RX ORDER — ISOSORBIDE DINITRATE 10 MG/1
10 TABLET ORAL 2 TIMES DAILY
Status: DISCONTINUED | OUTPATIENT
Start: 2021-07-19 | End: 2021-07-20 | Stop reason: HOSPADM

## 2021-07-19 RX ORDER — MELATONIN
1000 DAILY
Status: DISCONTINUED | OUTPATIENT
Start: 2021-07-20 | End: 2021-07-20 | Stop reason: HOSPADM

## 2021-07-19 RX ORDER — PYRIDOXINE HCL (VITAMIN B6) 50 MG
100 TABLET ORAL DAILY
Status: DISCONTINUED | OUTPATIENT
Start: 2021-07-20 | End: 2021-07-20 | Stop reason: HOSPADM

## 2021-07-19 RX ORDER — SODIUM CHLORIDE, SODIUM LACTATE, POTASSIUM CHLORIDE, CALCIUM CHLORIDE 600; 310; 30; 20 MG/100ML; MG/100ML; MG/100ML; MG/100ML
75 INJECTION, SOLUTION INTRAVENOUS CONTINUOUS
Status: DISCONTINUED | OUTPATIENT
Start: 2021-07-19 | End: 2021-07-20 | Stop reason: HOSPADM

## 2021-07-19 RX ORDER — LISINOPRIL 10 MG/1
40 TABLET ORAL DAILY
Status: DISCONTINUED | OUTPATIENT
Start: 2021-07-20 | End: 2021-07-19

## 2021-07-19 RX ORDER — ACETAMINOPHEN 325 MG/1
650 TABLET ORAL EVERY 6 HOURS PRN
Status: DISCONTINUED | OUTPATIENT
Start: 2021-07-19 | End: 2021-07-20 | Stop reason: HOSPADM

## 2021-07-19 RX ADMIN — APIXABAN 2.5 MG: 2.5 TABLET, FILM COATED ORAL at 17:53

## 2021-07-19 RX ADMIN — SODIUM CHLORIDE 500 ML: 0.9 INJECTION, SOLUTION INTRAVENOUS at 10:51

## 2021-07-19 RX ADMIN — INSULIN LISPRO 1 UNITS: 100 INJECTION, SOLUTION INTRAVENOUS; SUBCUTANEOUS at 22:11

## 2021-07-19 RX ADMIN — SODIUM CHLORIDE, SODIUM LACTATE, POTASSIUM CHLORIDE, AND CALCIUM CHLORIDE 75 ML/HR: .6; .31; .03; .02 INJECTION, SOLUTION INTRAVENOUS at 20:08

## 2021-07-19 RX ADMIN — ISOSORBIDE DINITRATE 10 MG: 10 TABLET ORAL at 19:05

## 2021-07-19 RX ADMIN — METOPROLOL TARTRATE 12.5 MG: 25 TABLET, FILM COATED ORAL at 20:19

## 2021-07-19 RX ADMIN — SODIUM CHLORIDE 500 ML: 0.9 INJECTION, SOLUTION INTRAVENOUS at 16:38

## 2021-07-19 RX ADMIN — ACETAMINOPHEN 650 MG: 325 TABLET, FILM COATED ORAL at 22:11

## 2021-07-19 RX ADMIN — GABAPENTIN 100 MG: 100 CAPSULE ORAL at 17:53

## 2021-07-19 NOTE — ASSESSMENT & PLAN NOTE
Lab Results   Component Value Date    HGBA1C 6 8 (H) 07/09/2021       Recent Labs     07/19/21  1633   POCGLU 281*       Blood Sugar Average: Last 72 hrs:  (P) 281   · Appears fairly well controlled as an outpatient as evidenced by hemoglobin A1c  · Hold Amaryl while inpatient  · SSI coverage  · Q i d  Glucose checks  · Consistent carb diet  · Monitor and adjust regimen as needed

## 2021-07-19 NOTE — ASSESSMENT & PLAN NOTE
· Patient with history of CAD, status post PCI in the past, not a good candidate for CABG per review of outpatient cardiology records  · Troponin 0 05/0 04, trending down  · Continue telemetry  · Continue Plavix, isosorbide and metoprolol

## 2021-07-19 NOTE — ASSESSMENT & PLAN NOTE
· Documented in outpatient PCP notes  · Continue Lopressor 12 5 mg b i d  For rate control  · Continue Eliquis 2 5 mg b i d   For anticoagulation  · Monitor on telemetry

## 2021-07-19 NOTE — ASSESSMENT & PLAN NOTE
· BP appears stable on review  · Noted to have positive orthostatics in the ER, Cardura on hold  · Continue amlodipine 10 mg daily and Lopressor 12 5 mg daily with holding parameters  · Monitor closely

## 2021-07-19 NOTE — ASSESSMENT & PLAN NOTE
Lab Results   Component Value Date    EGFR 29 07/19/2021    EGFR 31 07/09/2021    EGFR 28 04/21/2021    CREATININE 2 06 (H) 07/19/2021    CREATININE 1 96 (H) 07/09/2021    CREATININE 2 12 (H) 04/21/2021   · Baseline creatinine appears to fluctuate on review, likely around 1 9-2  · Creatinine close to baseline currently at 2 06  · Hold lisinopril for now and monitor blood pressures  · Gentle IV fluid hydration in setting of orthostatic hypotension  · Obtain repeat BMP in the a m    · Avoid nephrotoxic agents and hypotension as able

## 2021-07-19 NOTE — ED PROVIDER NOTES
History  Chief Complaint   Patient presents with    Loss of Consciousness     Patient with near syncopal episodes X3 this AM  Dizzy and off balance  denies pain     83 yo male who presents to the ED for evaluation of syncope x 3 per EMS  Pt is a very poor historian and therefore a complete history is not able to be obtained  He denies chest pain  Denies SOB  Denies abd pain and back pain  Denies focal weakness or numbness  Prior to Admission Medications   Prescriptions Last Dose Informant Patient Reported? Taking?    Blood Glucose Monitoring Suppl (ONE TOUCH ULTRA MINI) w/Device KIT   No No   Sig: Use daily   amLODIPine (NORVASC) 10 mg tablet  Child No No   Sig: TAKE 1 TABLET BY MOUTH  DAILY   apixaban (ELIQUIS) 2 5 mg   No No   Sig: Take 1 tablet (2 5 mg total) by mouth 2 (two) times a day   cholecalciferol (VITAMIN D3) 1,000 units tablet  Child Yes No   Sig: Take 1,000 Units by mouth daily   clopidogrel (PLAVIX) 75 mg tablet  Child No No   Sig: Take 1 tablet (75 mg total) by mouth daily   doxazosin (CARDURA) 8 MG tablet   No No   Sig: TAKE 1 TABLET BY MOUTH  DAILY   gabapentin (NEURONTIN) 100 mg capsule  Child No No   Sig: Take 1 capsule (100 mg total) by mouth 2 (two) times a day   glimepiride (AMARYL) 1 mg tablet   No No   Sig: Take 1 tablet (1 mg total) by mouth daily with breakfast   glucose blood (ONE TOUCH ULTRA TEST) test strip  Child No No   Sig: Test once daily   isosorbide dinitrate (ISORDIL) 10 mg tablet  Child No No   Sig: Take 1 tablet (10 mg total) by mouth 2 (two) times a day   lisinopril (ZESTRIL) 40 mg tablet   No No   Sig: Take 1 tablet (40 mg total) by mouth daily   metoprolol tartrate (LOPRESSOR) 25 mg tablet  Child No No   Sig: Take 1 tablet (25 mg total) by mouth every 12 (twelve) hours   Patient taking differently: Take 12 5 mg by mouth every 12 (twelve) hours Take 12 5mg daily   nitroglycerin (NITROSTAT) 0 4 mg SL tablet  Child No No   Sig: Place 1 tablet (0 4 mg total) under the tongue every 5 (five) minutes as needed for chest pain   Patient not taking: Reported on 7/16/2021   omega-3-acid ethyl esters (LOVAZA) 1 g capsule  Child Yes No   Sig: Take 1,200 mg by mouth daily   pyridoxine (RA VITAMIN B-6) 100 MG tablet  Child Yes No   Sig: Take 1 tablet by mouth daily   tiZANidine (ZANAFLEX) 4 mg tablet   No No   Sig: Take 1 tablet (4 mg total) by mouth every 8 (eight) hours as needed for muscle spasms   vitamin B-12 (CYANOCOBALAMIN) 100 MCG tablet  Child Yes No   Sig: Take 1,000 mcg by mouth daily      Facility-Administered Medications: None       Past Medical History:   Diagnosis Date    Acute deep vein thrombosis (DVT) of brachial vein of left upper extremity ([facility] [address] ) 11/24/2017    Acute deep vein thrombosis (DVT) of left peroneal vein (HCC)     Acute ST elevation myocardial infarction ([facility] [address] )     Aortic valve stenosis     [facility]     Basilar artery stenosis     Basilar artery stenosis     Benign prostatic hyperplasia with lower urinary tract symptoms     CAD (coronary artery disease)     Chronic kidney disease     Closed fracture of multiple ribs of left side with routine healing 9/3/2020    DM2 (diabetes mellitus, type 2) ([facility] [address] )     History of transfusion     HLD (hyperlipidemia)     HTN (hypertension)     Middle cerebral artery stenosis     Proteinuria     Symptomatic bradycardia     Transient cerebral ischemia     Vitamin D deficiency        Past Surgical History:   Procedure Laterality Date    CARDIAC CATHETERIZATION      Outcome: successful; last assessed: 02/03/2015    CARDIAC CATHETERIZATION  11/26/2019    CORONARY ANGIOPLASTY WITH STENT PLACEMENT  2008    stent to LAD     HAND SURGERY      thumb    HIP HARDWARE REMOVAL      TOTAL HIP ARTHROPLASTY Right     TOTAL HIP ARTHROPLASTY Bilateral        Family History   Problem Relation Age of Onset    Emphysema Father     Emphysema Sister      I have reviewed and agree with the history as documented  E-Cigarette/Vaping    E-Cigarette Use Never User      E-Cigarette/Vaping Substances    Nicotine No     THC No     CBD No     Flavoring No     Other No     Unknown No      Social History     Tobacco Use    Smoking status: Never Smoker    Smokeless tobacco: Never Used   Vaping Use    Vaping Use: Never used   Substance Use Topics    Alcohol use: Never    Drug use: No       Review of Systems   Unable to perform ROS: Dementia       Physical Exam  Physical Exam  Vitals and nursing note reviewed  Constitutional:       General: He is not in acute distress  Appearance: Normal appearance  He is well-developed  He is not ill-appearing, toxic-appearing or diaphoretic  Comments: Disheveled, unkempt  HENT:      Head: Normocephalic and atraumatic  Eyes:      Conjunctiva/sclera: Conjunctivae normal       Pupils: Pupils are equal, round, and reactive to light  Neck:      Vascular: No JVD  Cardiovascular:      Rate and Rhythm: Normal rate and regular rhythm  Heart sounds: Normal heart sounds  No murmur heard  No friction rub  No gallop  Pulmonary:      Effort: Pulmonary effort is normal  No respiratory distress  Breath sounds: Normal breath sounds  No stridor  No wheezing or rales  Abdominal:      General: There is no distension  Palpations: Abdomen is soft  Tenderness: There is no abdominal tenderness  Musculoskeletal:         General: No tenderness or deformity  Normal range of motion  Cervical back: Normal range of motion and neck supple  No rigidity  Skin:     General: Skin is warm and dry  Capillary Refill: Capillary refill takes less than 2 seconds  Neurological:      Mental Status: He is alert  He is disoriented  Cranial Nerves: No cranial nerve deficit  Sensory: No sensory deficit  Motor: Weakness present  No abnormal muscle tone        Coordination: Coordination normal       Gait: Gait abnormal          Vital Signs  ED Triage Vitals   Temperature Pulse Respirations Blood Pressure SpO2   07/19/21 0936 07/19/21 0934 07/19/21 0934 07/19/21 0934 07/19/21 0934   97 6 °F (36 4 °C) 75 20 110/63 95 %      Temp Source Heart Rate Source Patient Position - Orthostatic VS BP Location FiO2 (%)   07/19/21 0936 07/19/21 0934 07/19/21 0934 07/19/21 0934 --   Oral Monitor Lying Left arm       Pain Score       --                  Vitals:    07/19/21 1125 07/19/21 1126 07/19/21 1130 07/19/21 1400   BP: 131/60 120/64 146/71 139/73   Pulse: 87 88 84 65   Patient Position - Orthostatic VS: Standing - Orthostatic VS Standing for 3 minutes - Orthostatic VS Sitting Lying         Visual Acuity  Visual Acuity      Most Recent Value   L Pupil Size (mm)  3   R Pupil Size (mm)  3          ED Medications  Medications   sodium chloride 0 9 % bolus 500 mL (has no administration in time range)   sodium chloride 0 9 % bolus 500 mL (0 mL Intravenous Stopped 7/19/21 1122)       Diagnostic Studies  Results Reviewed     Procedure Component Value Units Date/Time    Novel Coronavirus (Covid-19),PCR [facility] - 2 Hour Stat [[id number]]  (Normal) Collected: 07/19/21 0957    Lab Status: Final result Specimen: Nares from Nose Updated: 07/19/21 1102     SARS-CoV-2 Negative    Narrative: The specimen collection materials, transport medium, and/or testing methodology utilized in the production of these test results have been proven to be reliable in a limited validation with an abbreviated program under the Emergency Utilization Authorization provided by the FDA  Testing reported as "Presumptive positive" will be confirmed with secondary testing to ensure result accuracy  Clinical caution and judgement should be used with the interpretation of these results with consideration of the clinical impression and other laboratory testing  Testing reported as "Positive" or "Negative" has been proven to be accurate according to standard laboratory validation requirements    All testing is performed with control materials showing appropriate reactivity at standard intervals  Lactic acid [[id number]]  (Normal) Collected: 07/19/21 0957    Lab Status: Final result Specimen: Blood from Arm, Right Updated: 07/19/21 1036     LACTIC ACID 1 8 mmol/L     Narrative:      Result may be elevated if tourniquet was used during collection      Blood gas, venous [[id number]]  (Abnormal) Collected: 07/19/21 0957    Lab Status: Final result Specimen: Blood from Arm, Right Updated: 07/19/21 1032     pH, Dexter 7 395     pCO2, Dexter 36 4 mm Hg      pO2, Dexter 56 0 mm Hg      HCO3, Dexter 21 8 mmol/L      Base Excess, Dexter -2 5 mmol/L      O2 Content, Dexter 16 6 ml/dL      O2 HGB, VENOUS 86 7 %     Troponin I [[id number]]  (Abnormal) Collected: 07/19/21 0957    Lab Status: Final result Specimen: Blood from Arm, Right Updated: 07/19/21 1029     Troponin I 0 05 ng/mL     Basic metabolic panel [[id number]]  (Abnormal) Collected: 07/19/21 0957    Lab Status: Final result Specimen: Blood from Arm, Right Updated: 07/19/21 1029     Sodium 136 mmol/L      Potassium 4 3 mmol/L      Chloride 100 mmol/L      CO2 23 mmol/L      ANION GAP 13 mmol/L      BUN 33 mg/dL      Creatinine 2 06 mg/dL      Glucose 328 mg/dL      Calcium 8 6 mg/dL      eGFR 29 ml/min/1 73sq m     Narrative:      Jose guidelines for Chronic Kidney Disease (CKD):     Stage 1 with normal or high GFR (GFR > 90 mL/min/1 73 square meters)    Stage 2 Mild CKD (GFR = 60-89 mL/min/1 73 square meters)    Stage 3A Moderate CKD (GFR = 45-59 mL/min/1 73 square meters)    Stage 3B Moderate CKD (GFR = 30-44 mL/min/1 73 square meters)    Stage 4 Severe CKD (GFR = 15-29 mL/min/1 73 square meters)    Stage 5 End Stage CKD (GFR <15 mL/min/1 73 square meters)  Note: GFR calculation is accurate only with a steady state creatinine    Hepatic function panel [[id number]]  (Abnormal) Collected: 07/19/21 0957    Lab Status: Final result Specimen: Blood from Arm, Right Updated: 07/19/21 1029     Total Bilirubin 0 36 mg/dL      Bilirubin, Direct 0 10 mg/dL      Alkaline Phosphatase 51 U/L      AST 22 U/L      ALT 16 U/L      Total Protein 7 5 g/dL      Albumin 3 2 g/dL     CBC and differential [[id number]]  (Abnormal) Collected: 07/19/21 0957    Lab Status: Final result Specimen: Blood from Arm, Right Updated: 07/19/21 1025     WBC 5 78 Thousand/uL      RBC 4 04 Million/uL      Hemoglobin 12 5 g/dL      Hematocrit 38 3 %      MCV 95 fL      MCH 30 9 pg      MCHC 32 6 g/dL      RDW 12 9 %      MPV 10 0 fL      Platelets 270 Thousands/uL      nRBC 0 /100 WBCs      Neutrophils Relative 76 %      Immat GRANS % 0 %      Lymphocytes Relative 15 %      Monocytes Relative 6 %      Eosinophils Relative 2 %      Basophils Relative 1 %      Neutrophils Absolute 4 39 Thousands/µL      Immature Grans Absolute 0 02 Thousand/uL      Lymphocytes Absolute 0 88 Thousands/µL      Monocytes Absolute 0 35 Thousand/µL      Eosinophils Absolute 0 11 Thousand/µL      Basophils Absolute 0 03 Thousands/µL     Protime-INR [[id number]]  (Normal) Collected: 07/19/21 0957    Lab Status: Final result Specimen: Blood from Arm, Right Updated: 07/19/21 1022     Protime 13 8 seconds      INR 1 04    APTT [[id number]]  (Normal) Collected: 07/19/21 0957    Lab Status: Final result Specimen: Blood from Arm, Right Updated: 07/19/21 1022     PTT 32 seconds     Blood culture #1 [[id number]] Collected: 07/19/21 0957    Lab Status: In process Specimen: Blood from Arm, Right Updated: 07/19/21 1005    Blood culture #2 [[id number]] Collected: 07/19/21 0957    Lab Status:  In process Specimen: Blood from Arm, Right Updated: 07/19/21 1005    UA (URINE) with reflex to Scope [[id number]]     Lab Status: No result Specimen: Urine                  No orders to display              Procedures  Procedures         ED Course                                           MDM    Disposition  Final diagnoses:   Syncope   Elevated troponin   CKD (chronic kidney disease)     Time reflects when diagnosis was documented in both MDM as applicable and the Disposition within this note     Time User Action Codes Description Comment    7/19/2021 10:39 AM Elisa Locke Add [R55] Syncope     7/19/2021 10:39 AM GustafsonDriss smithis Add [R77 8] Elevated troponin     7/19/2021 10:39 AM Gustafson, Robina Alex Add [N18 9] CKD (chronic kidney disease)       ED Disposition     ED Disposition Condition Date/Time Comment    Admit Stable Mon Jul 19, 2021 11:08 AM Case was discussed with Dr Merlinda Ports and the patient's admission status was agreed to be Admission Status: observation status to the service of Dr Merlinda Ports   Follow-up Information    None         Patient's Medications   Discharge Prescriptions    No medications on file     No discharge procedures on file      PDMP Review       Value Time User    PDMP Reviewed  Yes 8/22/2020  5:29 PM Sowmya Zamudio [place]          ED Provider  Electronically Signed by           Herlinda Palomo MD  07/19/21 8618

## 2021-07-19 NOTE — ASSESSMENT & PLAN NOTE
· Patient with episode of dizziness, diaphoresis and feeling as if he was going to pass out    Denies any actual loss of consciousness or fall  · Orthostatics positive in the ER  · Placed on compression stockings  · Give additional 1 time IV fluid bolus of 500 cc and placed on IV fluid hydration at 75 cc/hour  · No imaging obtained on admission, nonfocal neuro exam, hold off on imaging for now unless any changes to mentation  · Continue telemetry monitoring  · Troponins are trending down, flat  · Hold doxazosin for now  · Monitor closely

## 2021-07-19 NOTE — H&P
[facility]  H&P- Mela Powell 1935, 82 y o  male MRN: [id number]  Unit/Bed#: FT 02 Encounter: [id number]  Primary Care Provider: Srinivas Trinidad DO   Date and time admitted to hospital: 7/19/2021  9:22 AM      DOS: 7/19/2021  * Near syncope  Assessment & Plan  · Patient with episode of dizziness, diaphoresis and feeling as if he was going to pass out  Denies any actual loss of consciousness or fall  · Orthostatics positive in the ER  · Placed on compression stockings  · Give additional 1 time IV fluid bolus of 500 cc and placed on IV fluid hydration at 75 cc/hour  · No imaging obtained on admission, nonfocal neuro exam, hold off on imaging for now unless any changes to mentation  · Continue telemetry monitoring  · Troponins are trending down, flat  · Hold doxazosin for now  · Monitor closely    Elevated troponin  Assessment & Plan  · POA, troponin 0 05/0 04  · Trending down, likely reactive  · Continue telemetry monitoring    Type 2 diabetes mellitus with stage 3b chronic kidney disease, without long-term current use of insulin (HCC)  Assessment & Plan  Lab Results   Component Value Date    HGBA1C 6 8 (H) 07/09/2021       Recent Labs     07/19/21  1633   POCGLU 281*       Blood Sugar Average: Last 72 hrs:  (P) 281   · Appears fairly well controlled as an outpatient as evidenced by hemoglobin A1c  · Hold Amaryl while inpatient  · SSI coverage  · Q i d  Glucose checks  · Consistent carb diet  · Monitor and adjust regimen as needed    Paroxysmal atrial fibrillation (HCC)  Assessment & Plan  · Documented in outpatient PCP notes  · Continue Lopressor 12 5 mg b i d  For rate control  · Continue Eliquis 2 5 mg b i d   For anticoagulation  · Monitor on telemetry    Stage 3b chronic kidney disease [facility])  Assessment & Plan  Lab Results   Component Value Date    EGFR 29 07/19/2021    EGFR 31 07/09/2021    EGFR 28 04/21/2021    CREATININE 2 06 (H) 07/19/2021    CREATININE 1 96 (H) 07/09/2021 CREATININE 2 12 (H) 04/21/2021   · Baseline creatinine appears to fluctuate on review, likely around 1 9-2  · Creatinine close to baseline currently at 2 06  · Hold lisinopril for now and monitor blood pressures  · Gentle IV fluid hydration in setting of orthostatic hypotension  · Obtain repeat BMP in the a m  · Avoid nephrotoxic agents and hypotension as able    CAD in native artery  Assessment & Plan  · Patient with history of CAD, status post PCI in the past, not a good candidate for CABG per review of outpatient cardiology records  · Troponin 0 05/0 04, trending down  · Continue telemetry  · Continue Plavix, isosorbide and metoprolol    HLD (hyperlipidemia)  Assessment & Plan  · Continue fish oil 1 g daily    Essential hypertension  Assessment & Plan  · BP appears stable on review  · Noted to have positive orthostatics in the ER, Cardura on hold  · Continue amlodipine 10 mg daily and Lopressor 12 5 mg daily with holding parameters  · Monitor closely      VTE Prophylaxis: Heparin  / sequential compression device   Code Status:  Level 1-full code  POLST: There is no POLST form on file for this patient (pre-hospital)  Discussion with family:  Discussed with patient at bedside regarding plan of care    Anticipated Length of Stay:  Patient will be admitted on an Observation basis with an anticipated length of stay of  < 2 midnights  Justification for Hospital Stay:  Patient with episode of near-syncope at home, likely related to orthostatic hypotension requiring IV fluid hydration and telemetry monitoring    Total Time for Visit, including Counseling / Coordination of Care: 30 minutes  Greater than 50% of this total time spent on direct patient counseling and coordination of care      Chief Complaint:  "I felt like I was going to pass out "    History of Present Illness:    Adamaris Mackenzie is a 82 y o  male with significant past medical history of DVT, paroxysmal AFib on Eliquis anticoagulation, hypertension, hyperlipidemia, diabetes mellitus, CAD who presents with near syncopal episode  Patient reports that this happened this morning  Reports only 1 episode  States that he had finished eating and was getting up to return back to his bedroom where he felt sudden onset of dizziness, diaphoretic and as if he was going to black out  Reports that he stumbled up his steps and was able to sit down with resolution of symptoms  He denies any actual loss of consciousness or fall  He denied any chest pain, shortness of breath or heart palpitations at that time  He denied any additional neurologic symptoms at that time including weakness or numbness and tingling  Review of Systems:    Review of Systems   Constitutional: Positive for diaphoresis  Negative for chills and fever  HENT: Negative for congestion, sneezing, sore throat, tinnitus and trouble swallowing  Eyes: Negative  Negative for visual disturbance  Respiratory: Negative for cough, chest tightness, shortness of breath and wheezing  Cardiovascular: Negative for chest pain, palpitations and leg swelling  Gastrointestinal: Negative for abdominal pain, diarrhea, nausea and vomiting  Genitourinary: Negative for difficulty urinating, dysuria, frequency and urgency  Musculoskeletal: Negative for back pain, joint swelling and myalgias  Skin: Negative for color change, pallor and rash  Neurological: Positive for dizziness and light-headedness  Negative for syncope, speech difficulty, weakness, numbness and headaches         Past Medical and Surgical History:     Past Medical History:   Diagnosis Date    Acute deep vein thrombosis (DVT) of brachial vein of left upper extremity ([facility] [address] ) 11/24/2017    Acute deep vein thrombosis (DVT) of left peroneal vein (HCC)     Acute ST elevation myocardial infarction [facility])     Aortic valve stenosis     Atrial fibrillation (HCC)     Basilar artery stenosis     Basilar artery stenosis     Benign prostatic hyperplasia with lower urinary tract symptoms     CAD (coronary artery disease)     Chronic kidney disease     Closed fracture of multiple ribs of left side with routine healing 9/3/2020    DM2 (diabetes mellitus, type 2) ([facility] [address] )     History of transfusion     HLD (hyperlipidemia)     HTN (hypertension)     Middle cerebral artery stenosis     Proteinuria     Symptomatic bradycardia     Transient cerebral ischemia     Vitamin D deficiency        Past Surgical History:   Procedure Laterality Date    CARDIAC CATHETERIZATION      Outcome: successful; last assessed: 02/03/2015    CARDIAC CATHETERIZATION  11/26/2019    CORONARY ANGIOPLASTY WITH STENT PLACEMENT  2008    stent to LAD     HAND SURGERY      thumb    HIP HARDWARE REMOVAL      TOTAL HIP ARTHROPLASTY Right     TOTAL HIP ARTHROPLASTY Bilateral        Meds/Allergies:    Prior to Admission medications    Medication Sig Start Date End Date Taking?  Authorizing Provider   amLODIPine (NORVASC) 10 mg tablet TAKE 1 TABLET BY MOUTH  DAILY 8/25/20   JALEEL Hodge   apixaban (ELIQUIS) 2 5 mg Take 1 tablet (2 5 mg total) by mouth 2 (two) times a day 7/16/21   Mikki Smith MD   Blood Glucose Monitoring Suppl (ONE TOUCH ULTRA MINI) w/Device KIT Use daily 6/8/21   [facility], DO   cholecalciferol (VITAMIN D3) 1,000 units tablet Take 1,000 Units by mouth daily    Historical Provider, MD   clopidogrel (PLAVIX) 75 mg tablet Take 1 tablet (75 mg total) by mouth daily 9/3/20   Jeneane Push, DO   doxazosin (CARDURA) 8 MG tablet TAKE 1 TABLET BY MOUTH  DAILY 6/26/21   Humberto Ruiz MD   gabapentin (NEURONTIN) 100 mg capsule Take 1 capsule (100 mg total) by mouth 2 (two) times a day 9/3/20 7/16/21  Jeneane Push, DO   glimepiride (AMARYL) 1 mg tablet Take 1 tablet (1 mg total) by mouth daily with breakfast 7/16/21   [facility], DO   glucose blood (ONE TOUCH ULTRA TEST) test strip Test once daily 6/12/20   Jeneane Push, DO   isosorbide dinitrate (ISORDIL) 10 mg tablet Take 1 tablet (10 mg total) by mouth 2 (two) times a day 5/4/21 6/3/21  eBrnard Han MD   lisinopril (ZESTRIL) 40 mg tablet Take 1 tablet (40 mg total) by mouth daily 6/11/21   Beto Garnett,    metoprolol tartrate (LOPRESSOR) 25 mg tablet Take 1 tablet (25 mg total) by mouth every 12 (twelve) hours  Patient taking differently: Take 12 5 mg by mouth every 12 (twelve) hours Take 12 5mg daily 3/19/21   JALEEL Salazar   nitroglycerin (NITROSTAT) 0 4 mg SL tablet Place 1 tablet (0 4 mg total) under the tongue every 5 (five) minutes as needed for chest pain  Patient not taking: Reported on 7/16/2021 4/24/20   Bernard Han MD   [id number]-[id number] ethyl esters (LOVAZA) 1 g capsule Take 1,200 mg by mouth daily    Historical Provider, MD   pyridoxine (RA VITAMIN B-6) 100 MG tablet Take 1 tablet by mouth daily 1/8/18   Historical Provider, MD   tiZANidine (ZANAFLEX) 4 mg tablet Take 1 tablet (4 mg total) by mouth every 8 (eight) hours as needed for muscle spasms 7/16/21   Beto Garnett DO   vitamin B-12 (CYANOCOBALAMIN) 100 MCG tablet Take 1,000 mcg by mouth daily    Historical Provider, MD     I have reviewed home medications with patient personally  Allergies: Allergies   Allergen Reactions    Celecoxib Other (See Comments)     Per pt does NOT remember type of reaction or severity level    Hydromorphone Other (See Comments)     Per pt does NOT remember type of reaction or severity level    Pravastatin Hives    Statins Other (See Comments)     Per pt does NOT remember type of reaction or severity level       Social History:     Marital Status:     Occupation:   Patient Pre-hospital Living Situation:  Patient lives at home with his girlfriend  Patient Pre-hospital Level of Mobility:  Full mobility  Patient Pre-hospital Diet Restrictions:  Diabetic  Substance Use History:   Social History     Substance and Sexual Activity   Alcohol Use Never     Social History Tobacco Use   Smoking Status Never Smoker   Smokeless Tobacco Never Used     Social History     Substance and Sexual Activity   Drug Use No       Family History:    non-contributory    Physical Exam:     Vitals:   Blood Pressure: 139/73 (07/19/21 1400)  Pulse: 65 (07/19/21 1400)  Temperature: 97 6 °F (36 4 °C) (07/19/21 0936)  Temp Source: Oral (07/19/21 0936)  Respirations: 20 (07/19/21 1400)  Height: 5' 10" (177 8 cm) (07/19/21 0934)  Weight - Scale: 97 5 kg (215 lb) (07/19/21 0934)  SpO2: 96 % (07/19/21 1400)    Physical Exam  Vitals reviewed  Constitutional:       General: He is not in acute distress  Appearance: He is not toxic-appearing  Comments: Patient is in no acute distress lying in his hospital bed resting comfortably  Alert and oriented, speech is fluent  Face is symmetrical   HENT:      Head: Normocephalic and atraumatic  Eyes:      Extraocular Movements: Extraocular movements intact  Conjunctiva/sclera: Conjunctivae normal    Cardiovascular:      Rate and Rhythm: Normal rate  Rhythm irregular  Pulses: Normal pulses  Comments: Heart rate in the 80s, AFib on monitor  Pulmonary:      Effort: Pulmonary effort is normal  No respiratory distress  Breath sounds: Normal breath sounds  No wheezing  Abdominal:      General: Bowel sounds are normal  There is no distension  Palpations: Abdomen is soft  Tenderness: There is no abdominal tenderness  Musculoskeletal:      Right lower leg: No edema  Left lower leg: No edema  Skin:     General: Skin is warm and dry  Findings: No erythema  Neurological:      Mental Status: He is alert  Comments: No dysmetria noted on finger-to-nose testing, strength equal and 5/5 upper and lower extremities bilaterally   Psychiatric:         Mood and Affect: Mood normal          Additional Data:     Lab Results: I have personally reviewed pertinent reports        Results from last 7 days   Lab Units 07/19/21  8426 WBC Thousand/uL 5 78   HEMOGLOBIN g/dL 12 5   HEMATOCRIT % 38 3   PLATELETS Thousands/uL 187   NEUTROS PCT % 76*   LYMPHS PCT % 15   MONOS PCT % 6   EOS PCT % 2     Results from last 7 days   Lab Units 07/19/21  0957   SODIUM mmol/L 136   POTASSIUM mmol/L 4 3   CHLORIDE mmol/L 100   CO2 mmol/L 23   BUN mg/dL 33*   CREATININE mg/dL 2 06*   ANION GAP mmol/L 13   CALCIUM mg/dL 8 6   ALBUMIN g/dL 3 2*   TOTAL BILIRUBIN mg/dL 0 36   ALK PHOS U/L 51   ALT U/L 16   AST U/L 22   GLUCOSE RANDOM mg/dL 328*     Results from last 7 days   Lab Units 07/19/21  0957   INR  1 04     Results from last 7 days   Lab Units 07/19/21  1633   POC GLUCOSE mg/dl 281*         Results from last 7 days   Lab Units 07/19/21  0957   LACTIC ACID mmol/L 1 8       Imaging: I have personally reviewed pertinent reports  No orders to display       EKG, Pathology, and Other Studies Reviewed on Admission:   · EKG: no ischemic change noted    [facility] Yes     ** Please Note: This note has been constructed using a voice recognition system   **

## 2021-07-19 NOTE — APP STUDENT NOTE
FABIEN STUDENT  Inpatient Progress Note for TRAINING ONLY  Not Part of Legal Medical Record       H&P Exam - Sunni Guillen 82 y o  male MRN: [id number]    Unit/Bed#: FT 02 Encounter: [id number]    Assessment/Plan:    Near Syncope  Patient notes he had two near syncopal episodes this morning that prompted him coming to the hospital    Notes being diaphoretic as well  Denies headaches, memory loss, chest pain, palpations, shortness of breath, nausea/ vomiting, leg swelling  EKG showed wide QRS rhythm, right bundle branch block  Elevated initial troponin 0 05  Consult Cardiology  Continuous telemetry monitoring  Continue to trend troponins  Take orthostatic BPs  Hold doxazosin   Continuous IVF hydration: 75 cc hour    CKD:   Chronically elevated BUN around 37 and Cr around 2 12  Patient's BUN in ER was 33 and Cr around 2 06    Current values around patient's baseline  Continue to monitor BMP in AM  Give patient 500 cc bolus in ER and continuous IVF 75 cc per hour on floor  Monitor closely     Avoid nephrotoxic agents and hypotension  Continue BP meds with hold parameters    Elevated troponin   Patient presented with 2 near syncopal episodes and diaphoresis this morning prior to reporting to the ER  Denies chest pain, shortness of breath  Initial troponin was elevated: 0 05  Cardiology consulted  Continuous telemetry monitoring  Continue to trend troponins   Continue BP meds    CAD  Patient on Plavix 75 mg daily  Continue home meds  Consult Cardiology    HTN  Patient's BP stable upon admission  Take orthostatic BPs  Continue home BP meds with hold parameters  Monitor BP per unit protocol   Check BMP every morning    Type 2 DM  Check fingersticks 4 x daily  Diabetic diet  Hypoglycemia protocol  Order insulin lispro (HumaLOG) 100 units/mL subcutaneous injection 1-5 Units once daily at nighttime  insulin lispro (HumaLOG) 100 units/mL subcutaneous injection 1-5 Units 3 times daily before meals    History of DVT  Order Sequential Compression devices  Continue Eliquis 2 5 mg    BPH  Takes doxazosin at home  Withhold due to syncope/ orthostatic hypotension    History of Present Illness   82year old  male with PMH of HTN, HDL, CAD, Type 2 DM, CAD, prior history of DVT x 3, aortic valve stenosis, Atrial fibrillation, basilar artery stenosis and middle cerebral artery stenosis presents to the ER today for dizziness and light-headedness  Patient notes that he was eating breakfast this morning and after finishing gets up from his seat and felt really dizzy and broke out into a sweat  Patient was able to compose himself to go back up the stairs but had another episode of dizziness  Patient states he fell backwards but did not fall to the ground  He again got diaphoretic but was able to get himself back up  Patient is on plavix  Patient notes he's been feeling a lump behind his right thigh, that feels like a pulled muscle for the past week  States it's hard to move his leg  Patient denies feeling any pain  Patient denies fevers, chills, memory loss,  headaches, chest pain, orthopnea, shortness of breath, nausea, vomiting, abdominal pain, leg swelling  Patient monitors blood pressure daily and states he gets systolic bp 130H over diastolic 50L  ROS:   General: + sweats  Denies fevers, chills  Neuro: +dizziness, lightheadedness  Denies, headaches, memory loss   Heme: No nose bleeds/ bleeding gums  Cardiovascular: Denies chest pain, palpitations orthopnea  Pulmonary: Denies shortness of breath, difficulty breathing  Abdominal: Denies abdominal pain, nausea, vomiting, diarrhea, constipation    PV: No claudication or swelling  Skin: No rashes/lesions  MS: no weakness or joint pain    Historical Information   Past Medical History:   Diagnosis Date    Acute deep vein thrombosis (DVT) of brachial vein of left upper extremity ([facility] [address] ) 11/24/2017    Acute deep vein thrombosis (DVT) of left peroneal vein (HCC)     Acute ST elevation myocardial infarction [facility])     Aortic valve stenosis     Atrial fibrillation (HCC)     Basilar artery stenosis     Basilar artery stenosis     Benign prostatic hyperplasia with lower urinary tract symptoms     CAD (coronary artery disease)     Chronic kidney disease     Closed fracture of multiple ribs of left side with routine healing 9/3/2020    DM2 (diabetes mellitus, type 2) ([facility] [address] )     History of transfusion     HLD (hyperlipidemia)     HTN (hypertension)     Middle cerebral artery stenosis     Proteinuria     Symptomatic bradycardia     Transient cerebral ischemia     Vitamin D deficiency      Past Surgical History:   Procedure Laterality Date    CARDIAC CATHETERIZATION      Outcome: successful; last assessed: 02/03/2015    CARDIAC CATHETERIZATION  11/26/2019    CORONARY ANGIOPLASTY WITH STENT PLACEMENT  2008    stent to LAD     HAND SURGERY      thumb    HIP HARDWARE REMOVAL      TOTAL HIP ARTHROPLASTY Right     TOTAL HIP ARTHROPLASTY Bilateral      Social History   Social History     Substance and Sexual Activity   Alcohol Use Never     Social History     Substance and Sexual Activity   Drug Use No     Social History     Tobacco Use   Smoking Status Never Smoker   Smokeless Tobacco Never Used     Family History:   Family History   Problem Relation Age of Onset    Emphysema Father     Emphysema Sister        Meds/Allergies   Medications need to be confirmed with daughter Lev Claros as per patient  Allergies   Allergen Reactions    Celecoxib Other (See Comments)     Per pt does NOT remember type of reaction or severity level    Hydromorphone Other (See Comments)     Per pt does NOT remember type of reaction or severity level    Pravastatin Hives    Statins Other (See Comments)     Per pt does NOT remember type of reaction or severity level       Objective   First Vitals:   Blood Pressure: 110/63 (07/19/21 0934)  Pulse: 75 (07/19/21 0934)  Temperature: 97 6 °F (36 4 °C) (07/19/21 2934)  Temp Source: Oral (07/19/21 0936)  Respirations: 20 (07/19/21 0934)  Height: 5' 10" (177 8 cm) (07/19/21 0934)  Weight - Scale: 97 5 kg (215 lb) (07/19/21 0934)  SpO2: 95 % (07/19/21 0934)    Current Vitals:   Blood Pressure: 139/73 (07/19/21 1400)  Pulse: 65 (07/19/21 1400)  Temperature: 97 6 °F (36 4 °C) (07/19/21 0936)  Temp Source: Oral (07/19/21 0936)  Respirations: 20 (07/19/21 1400)  Height: 5' 10" (177 8 cm) (07/19/21 0934)  Weight - Scale: 97 5 kg (215 lb) (07/19/21 0934)  SpO2: 96 % (07/19/21 1400)    No intake or output data in the 24 hours ending 07/19/21 1456    Invasive Devices     Peripheral Intravenous Line            Peripheral IV 07/19/21 Right Antecubital <1 day    Peripheral IV 07/19/21 Right Hand <1 day                Physical Exam: /73 (BP Location: Left arm)   Pulse 65   Temp 97 6 °F (36 4 °C) (Oral)   Resp 20   Ht 5' 10" (1 778 m)   Wt 97 5 kg (215 lb)   SpO2 96%   BMI 30 85 kg/m²      General: Alert, cooperative, no distress, appears stated age  Head: Normocephalic, without obvious abnormality, atraumatic  Eyes: PERRL, conjunctiva/corneas clear, EOM's intact    Cardiovascular: Regular rate and rhythm, S1 and S2 normal, no murmur, rub   or gallop  Pulmonary: Clear to auscultation bilaterally, respirations unlabored  Abdomen: Soft, non-tender, bowel sounds active all four quadrants,     no masses, no organomegaly  Extremities: Extremities normal, atraumatic, no cyanosis or edema  Pulses:  2+ and symmetric all extremities  Skin:  Skin color, texture, turgor normal, no rashes or lesions  Neurologic: Alert  Normal strength            Lab Results: Elevated troponin  elevated neutrophils  Increased BUN/ Creatinine  Elevated glucose  Decreased albumin  BG abnormal  Pt/INR normal  APTT normal  Lactic acid normal  Negative COVID test  Blood cultures pending  Imaging: None  EKG, Pathology, and Other Studies: EKG showed wide QRS rhythm  Right bundle branch block       Code Status: Prior  Advance Directive and Living Will:      Power of :    POLST:      Counseling / Coordination of Care: Total floor / unit time spent today 45 minutes

## 2021-07-20 VITALS
HEIGHT: 70 IN | RESPIRATION RATE: 20 BRPM | HEART RATE: 71 BPM | TEMPERATURE: 98.3 F | BODY MASS INDEX: 30.78 KG/M2 | WEIGHT: 215 LBS | OXYGEN SATURATION: 98 % | DIASTOLIC BLOOD PRESSURE: 91 MMHG | SYSTOLIC BLOOD PRESSURE: 150 MMHG

## 2021-07-20 LAB
ANION GAP SERPL CALCULATED.3IONS-SCNC: 11 MMOL/L (ref 4–13)
BUN SERPL-MCNC: 34 MG/DL (ref 5–25)
CALCIUM SERPL-MCNC: 8.9 MG/DL (ref 8.3–10.1)
CHLORIDE SERPL-SCNC: 99 MMOL/L (ref 100–108)
CO2 SERPL-SCNC: 27 MMOL/L (ref 21–32)
CREAT SERPL-MCNC: 1.72 MG/DL (ref 0.6–1.3)
GFR SERPL CREATININE-BSD FRML MDRD: 36 ML/MIN/1.73SQ M
GLUCOSE P FAST SERPL-MCNC: 126 MG/DL (ref 65–99)
GLUCOSE SERPL-MCNC: 126 MG/DL (ref 65–140)
GLUCOSE SERPL-MCNC: 137 MG/DL (ref 65–140)
GLUCOSE SERPL-MCNC: 209 MG/DL (ref 65–140)
POTASSIUM SERPL-SCNC: 4 MMOL/L (ref 3.5–5.3)
SODIUM SERPL-SCNC: 137 MMOL/L (ref 136–145)

## 2021-07-20 PROCEDURE — 82948 REAGENT STRIP/BLOOD GLUCOSE: CPT

## 2021-07-20 PROCEDURE — 97166 OT EVAL MOD COMPLEX 45 MIN: CPT

## 2021-07-20 PROCEDURE — 99217 PR OBSERVATION CARE DISCHARGE MANAGEMENT: CPT | Performed by: NURSE PRACTITIONER

## 2021-07-20 PROCEDURE — 97163 PT EVAL HIGH COMPLEX 45 MIN: CPT

## 2021-07-20 PROCEDURE — 80048 BASIC METABOLIC PNL TOTAL CA: CPT | Performed by: PHYSICIAN ASSISTANT

## 2021-07-20 RX ADMIN — SODIUM CHLORIDE, SODIUM LACTATE, POTASSIUM CHLORIDE, AND CALCIUM CHLORIDE 75 ML/HR: .6; .31; .03; .02 INJECTION, SOLUTION INTRAVENOUS at 09:19

## 2021-07-20 RX ADMIN — OMEGA-3 FATTY ACIDS CAP 1000 MG 1000 MG: 1000 CAP at 09:17

## 2021-07-20 RX ADMIN — Medication 100 MG: at 09:16

## 2021-07-20 RX ADMIN — CYANOCOBALAMIN TAB 500 MCG 1000 MCG: 500 TAB at 09:16

## 2021-07-20 RX ADMIN — CLOPIDOGREL BISULFATE 75 MG: 75 TABLET ORAL at 09:16

## 2021-07-20 RX ADMIN — Medication 1000 UNITS: at 09:16

## 2021-07-20 RX ADMIN — GABAPENTIN 100 MG: 100 CAPSULE ORAL at 09:16

## 2021-07-20 RX ADMIN — METOPROLOL TARTRATE 12.5 MG: 25 TABLET, FILM COATED ORAL at 09:16

## 2021-07-20 RX ADMIN — AMLODIPINE BESYLATE 10 MG: 10 TABLET ORAL at 09:17

## 2021-07-20 RX ADMIN — ISOSORBIDE DINITRATE 10 MG: 10 TABLET ORAL at 09:17

## 2021-07-20 RX ADMIN — APIXABAN 2.5 MG: 2.5 TABLET, FILM COATED ORAL at 09:17

## 2021-07-20 RX ADMIN — INSULIN LISPRO 1 UNITS: 100 INJECTION, SOLUTION INTRAVENOUS; SUBCUTANEOUS at 12:28

## 2021-07-20 NOTE — PLAN OF CARE
Problem: PHYSICAL THERAPY ADULT  Goal: Performs mobility at highest level of function for planned discharge setting  See evaluation for individualized goals  Description: Treatment/Interventions: Functional transfer training, LE strengthening/ROM, Elevations, Therapeutic exercise, Endurance training, Cognitive reorientation, Patient/family training, Bed mobility, Gait training, Spoke to nursing, OT          See flowsheet documentation for full assessment, interventions and recommendations  Note: Prognosis: Good  Problem List: Decreased strength, Decreased endurance, Impaired balance, Decreased mobility, Decreased cognition, Decreased safety awareness, Pain  Assessment: Pt is 82year old male seen for PT evaluation s/p admit to [facility] on 7/19/2021 with Near syncope  PT consulted to assess pt's functional mobility and d/c needs  Order placed for PT eval and tx, with up and OOB as tolerated order  Comorbidities affecting pt's physical performance at time of assessment include essential hypertension, hyperlipidemia, CAD, stage 3b CKD, paroxysmal atrial fibrillation, type 2 DM, and elevated troponin  PTA, pt was independent with all functional mobility with occasional use of a cane  Personal factors affecting pt at time of IE include stairs to enter home, inability to navigate community distances, inability to navigate level surfaces without external assistance, unable to perform dynamic tasks in community, positive fall history, and inability to perform IADLs  Please find objective findings from PT assessment regarding body systems outlined above with impairments and limitations including weakness, impaired balance, decreased endurance, gait deviations, pain, decreased activity tolerance, decreased functional mobility tolerance, decreased safety awareness, fall risk, and decreased cognition   The following objective measures performed on IE also reveal limitations: Barthel Index: 65/100 and Modified Yaniv: 4 (moderate/severe disability)  Pt's clinical presentation is currently unstable/unpredictable seen in pt's presentation of need for ongoing medical management/monitoring, pt is a fall risk, and pt requires cues/assist for safety with functional mobility  Pt to benefit from continued PT tx to address deficits as defined above and maximize level of functional independent mobility and consistency  From PT/mobility standpoint, recommendation at time of d/c would be home with family support and outpatient PT pending progress in order to facilitate return to PLOF  Barriers to Discharge: Inaccessible home environment     PT Discharge Recommendation: Home with outpatient rehabilitation    See flowsheet documentation for full assessment

## 2021-07-20 NOTE — ASSESSMENT & PLAN NOTE
· BP appears stable on review  · Noted to have positive orthostatics in the ER, Cardura discontinued  · Continue amlodipine 10 mg daily and Lopressor 12 5 mg daily with holding parameters

## 2021-07-20 NOTE — ASSESSMENT & PLAN NOTE
Lab Results   Component Value Date    HGBA1C 6 8 (H) 07/09/2021       Recent Labs     07/19/21  1633 07/19/21  2037 07/20/21  0705 07/20/21  1107   POCGLU 281* 184* 137 209*       Blood Sugar Average: Last 72 hrs:  (P) 202 75   · Appears fairly well controlled as an outpatient as evidenced by hemoglobin A1c  · Resume home regiment

## 2021-07-20 NOTE — ASSESSMENT & PLAN NOTE
Lab Results   Component Value Date    EGFR 36 07/20/2021    EGFR 29 07/19/2021    EGFR 31 07/09/2021    CREATININE 1 72 (H) 07/20/2021    CREATININE 2 06 (H) 07/19/2021    CREATININE 1 96 (H) 07/09/2021   · Baseline creatinine appears to fluctuate on review, likely around 1 9-2  · Creatinine close to baseline currently at 2 06  · Resume lisinopril

## 2021-07-20 NOTE — ASSESSMENT & PLAN NOTE
· Documented in outpatient PCP notes  · Continue Lopressor 12 5 mg b i d  For rate control  · Continue Eliquis 2 5 mg b i d   For anticoagulation

## 2021-07-20 NOTE — ASSESSMENT & PLAN NOTE
· Patient with episode of dizziness, diaphoresis and feeling as if he was going to pass out    Denies any actual loss of consciousness or fall  · Orthostatics positive in the ER  · Placed on compression stockings  · Responded well to IV fluids  · No imaging obtained on admission, nonfocal neuro exam, hold off on imaging for now unless any changes to mentation  · No ectopy on telemetry  · Troponins are trending down, flat  · Resume home medications

## 2021-07-20 NOTE — PLAN OF CARE
Problem: OCCUPATIONAL THERAPY ADULT  Goal: Performs self-care activities at highest level of function for planned discharge setting  See evaluation for individualized goals  Description: Treatment Interventions: ADL retraining, Functional transfer training, UE strengthening/ROM, Patient/family training, Compensatory technique education, Continued evaluation, Activityengagement, Cognitive reorientation          See flowsheet documentation for full assessment, interventions and recommendations  Note: Limitation: Decreased UE strength, Decreased cognition, Decreased self-care trans, Decreased high-level ADLs, Decreased ADL status  Prognosis: Good  Assessment: Patient is a 82 y o  male seen for OT evaluation s/p admit to [address] 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 without long-term current use of insulin, paroxysmal atrial fibrillation, stage 3b CKD, CAD in native artery, HLD, and essential HTN  Patient  has a past medical history of Acute deep vein thrombosis (DVT) of brachial vein of left upper extremity ([facility] [address] ) (11/24/2017), Acute deep vein thrombosis (DVT) of left peroneal vein (HCC), Acute ST elevation myocardial infarction [facility]), Aortic valve stenosis, Atrial fibrillation ([facility] [address] ), Basilar artery stenosis, Basilar artery stenosis, Benign prostatic hyperplasia with lower urinary tract symptoms, CAD (coronary artery disease), Chronic kidney disease, Closed fracture of multiple ribs of left side with routine healing (9/3/2020), DM2 (diabetes mellitus, type 2) ([facility] [address] ), History of transfusion, HLD (hyperlipidemia), HTN (hypertension), Middle cerebral artery stenosis, Proteinuria, Symptomatic bradycardia, Transient cerebral ischemia, and Vitamin D deficiency  Orders placed for OT evaluation and treatment   Performed at least two patient identifiers during session including name and wristband  Prior to admission, patient was living with his SO in a one-story mobile home with 4 DILMA  At baseline, patient reports that he is independent in ADLs and receives assistance for IADLs  Personal factors affecting patient at time of initial evaluation include: steps to enter, difficulty performing ADLs and difficulty performing IADLs  Upon evaluation, patient requires supervision and set up assist for UB ADLs, supervision, set up and minimal  assist for LB ADLs, transfers and functional ambulation in room and bathroom with supervision assist, with no AD  Patient is alert, oriented to name,, oriented to place, and oriented to situation,  Occupational performance is affected by the following deficits: decreased muscle strength, dynamic sit/ stand balance deficit with poor standing tolerance time for self care and functional mobility, decreased activity tolerance, impaired memory, impaired problem solving and increased pain  Therapist completed expanded review of medical records and additional review of physical, cognitive or psychosocial history, clinical examination identifying 3-5 performance deficits, clinical decision making of a moderate complexity, consistent with moderate complexity level evaluation  Patient to benefit from continued Occupational Therapy treatment while in the hospital to address deficits as defined above and maximize level of functional independence with ADLs and functional mobility  Occupational Performance areas to address include: bathing/ shower, dressing, transfer to all surfaces, functional mobility, emergency response, IADLs: safety procedures, Leisure Participation and Social participation  From OT standpoint, recommendation at time of d/c would be Home with family support         OT Discharge Recommendation: No rehabilitation needs

## 2021-07-20 NOTE — DISCHARGE SUMMARY
[address] [facility]  Discharge- Andreas Solisigham 1935, 82 y o  male MRN: [id number]  Unit/Bed#: -01 Encounter: [id number]  Primary Care Provider: Akin Olmedo DO   Date and time admitted to hospital: 7/19/2021  9:22 AM    * Near syncope  Assessment & Plan  · Patient with episode of dizziness, diaphoresis and feeling as if he was going to pass out  Denies any actual loss of consciousness or fall  · Orthostatics positive in the ER  · Placed on compression stockings  · Responded well to IV fluids  · No imaging obtained on admission, nonfocal neuro exam, hold off on imaging for now unless any changes to mentation  · No ectopy on telemetry  · Troponins are trending down, flat  · Resume home medications    Elevated troponin  Assessment & Plan  · POA, troponin 0 05/0 04  · Trending down, likely reactive  · No chest pain    Type 2 diabetes mellitus with stage 3b chronic kidney disease, without long-term current use of insulin [facility])  Assessment & Plan  Lab Results   Component Value Date    HGBA1C 6 8 (H) 07/09/2021       Recent Labs     07/19/21  1633 07/19/21  2037 07/20/21  0705 07/20/21  1107   POCGLU 281* 184* 137 209*       Blood Sugar Average: Last 72 hrs:  (P) 202 75   · Appears fairly well controlled as an outpatient as evidenced by hemoglobin A1c  · Resume home regiment    Paroxysmal atrial fibrillation ([address] )  Assessment & Plan  · Documented in outpatient PCP notes  · Continue Lopressor 12 5 mg b i d  For rate control  · Continue Eliquis 2 5 mg b i d   For anticoagulation    Stage 3b chronic kidney disease [facility])  Assessment & Plan  Lab Results   Component Value Date    EGFR 36 07/20/2021    EGFR 29 07/19/2021    EGFR 31 07/09/2021    CREATININE 1 72 (H) 07/20/2021    CREATININE 2 06 (H) 07/19/2021    CREATININE 1 96 (H) 07/09/2021   · Baseline creatinine appears to fluctuate on review, likely around 1 9-2  · Creatinine close to baseline currently at 2 06  · Resume lisinopril    CAD in native artery  Assessment & Plan  · Patient with history of CAD, status post PCI in the past, not a good candidate for CABG per review of outpatient cardiology records  · Troponin 0 05/0 04, trending down  · Continue Plavix, isosorbide and metoprolol    HLD (hyperlipidemia)  Assessment & Plan  · Continue fish oil 1 g daily    Essential hypertension  Assessment & Plan  · BP appears stable on review  · Noted to have positive orthostatics in the ER, Cardura discontinued  · Continue amlodipine 10 mg daily and Lopressor 12 5 mg daily with holding parameters         Discharging Physician / Practitioner: Jessica Ruiz  PCP: Freddie Trivedi DO  Admission Date:   Admission Orders (From admission, onward)     Ordered        07/19/21 1109  Place in Observation  Once                   Discharge Date: 07/20/21    Medical Problems     Resolved Problems  Date Reviewed: 7/19/2021    None                Consultations During Hospital Stay:  ·  None    Procedures Performed:   · none    Significant Findings / Test Results:      No orders to display   ·     Incidental Findings:   · none     Test Results Pending at Discharge (will require follow up):   · none     Outpatient Tests Requested:  · none    Complications:  none    Reason for Admission:    Chief Complaint   Patient presents with    Loss of Consciousness     Patient with near syncopal episodes X3 this AM  Dizzy and off balance  denies pain       Hospital Course:     Muna Sims is a 82 y o  male patient who originally presented to the hospital on 7/19/2021 due to lightheadedness dizziness and loss of consciousness after further evaluation it was concerning for orthostatic hypotension his orthostatic vital signs were positive he received IV fluids we discontinued his Cardura and and he feels much better    Please see above list of diagnoses and related plan for additional information       Condition at Discharge: stable     Discharge Day Visit / Exam:     Subjective: Denies any chest pain chest tightness shortness of breath or difficulty breathing denies any lightheadedness or dizziness found  Vitals: Blood Pressure: 150/91 (07/20/21 1110)  Pulse: 71 (07/20/21 1110)  Temperature: 98 3 °F (36 8 °C) (07/20/21 0707)  Temp Source: Oral (07/19/21 0936)  Respirations: 20 (07/20/21 0228)  Height: 5' 10" (177 8 cm) (07/19/21 0934)  Weight - Scale: 97 5 kg (215 lb) (07/19/21 0934)  SpO2: 98 % (07/20/21 1110)  Exam:   Physical Exam    Discussion with Family:  His wife at bedside    Discharge instructions/Information to patient and family:   See after visit summary for information provided to patient and family  Provisions for Follow-Up Care:  See after visit summary for information related to follow-up care and any pertinent home health orders  Disposition:     Home    For Discharges to [facility] SNF:   · Not Applicable to this Patient - Not Applicable to this Patient    Planned Readmission:  No     Discharge Statement:  I spent 30 minutes discharging the patient  This time was spent on the day of discharge  I had direct contact with the patient on the day of discharge  Greater than 50% of the total time was spent examining patient, answering all patient questions, arranging and discussing plan of care with patient as well as directly providing post-discharge instructions  Additional time then spent on discharge activities  Discharge Medications:  See after visit summary for reconciled discharge medications provided to patient and family        ** Please Note: This note has been constructed using a voice recognition system **

## 2021-07-20 NOTE — PLAN OF CARE
Problem: PAIN - ADULT  Goal: Verbalizes/displays adequate comfort level or baseline comfort level  Description: Interventions:  - Encourage patient to monitor pain and request assistance  - Assess pain using appropriate pain scale  - Administer analgesics based on type and severity of pain and evaluate response  - Implement non-pharmacological measures as appropriate and evaluate response  - Consider cultural and social influences on pain and pain management  - Notify physician/advanced practitioner if interventions unsuccessful or patient reports new pain  Outcome: Progressing     Problem: INFECTION - ADULT  Goal: Absence or prevention of progression during hospitalization  Description: INTERVENTIONS:  - Assess and monitor for signs and symptoms of infection  - Monitor lab/diagnostic results  - Monitor all insertion sites, i e  indwelling lines, tubes, and drains  - Monitor endotracheal if appropriate and nasal secretions for changes in amount and color  - Lawrence appropriate cooling/warming therapies per order  - Administer medications as ordered  - Instruct and encourage patient and family to use good hand hygiene technique  - Identify and instruct in appropriate isolation precautions for identified infection/condition  Outcome: Progressing  Goal: Absence of fever/infection during neutropenic period  Description: INTERVENTIONS:  - Monitor WBC    Outcome: Progressing     Problem: SAFETY ADULT  Goal: Patient will remain free of falls  Description: INTERVENTIONS:  - Educate patient/family on patient safety including physical limitations  - Instruct patient to call for assistance with activity   - Consult OT/PT to assist with strengthening/mobility   - Keep Call bell within reach  - Keep bed low and locked with side rails adjusted as appropriate  - Keep care items and personal belongings within reach  - Initiate and maintain comfort rounds  - Make Fall Risk Sign visible to staff  - Offer Toileting   - Initiate/Maintain alarm  - Obtain necessary fall risk management equipment  - Apply yellow socks and bracelet for high fall risk patients  - Consider moving patient to room near nurses station  Outcome: Progressing  Goal: Maintain or return to baseline ADL function  Description: INTERVENTIONS:  -  Assess patient's ability to carry out ADLs; assess patient's baseline for ADL function and identify physical deficits which impact ability to perform ADLs (bathing, care of mouth/teeth, toileting, grooming, dressing, etc )  - Assess/evaluate cause of self-care deficits   - Assess range of motion  - Assess patient's mobility; develop plan if impaired  - Assess patient's need for assistive devices and provide as appropriate  - Encourage maximum independence but intervene and supervise when necessary  - Involve family in performance of ADLs  - Assess for home care needs following discharge   - Consider OT consult to assist with ADL evaluation and planning for discharge  - Provide patient education as appropriate  Outcome: Progressing  Goal: Maintains/Returns to pre admission functional level  Description: INTERVENTIONS:  - Perform BMAT or MOVE assessment daily    - Set and communicate daily mobility goal to care team and patient/family/caregiver     - Collaborate with rehabilitation services on mobility goals if consulted  - Perform Range of Motion  - Reposition patient   - Dangle patient   - Stand patient  - Ambulate patient   - Out of bed to chair  - Out of bed for meals   - Out of bed for toileting  - Record patient progress and toleration of activity level   Outcome: Progressing     Problem: DISCHARGE PLANNING  Goal: Discharge to home or other facility with appropriate resources  Description: INTERVENTIONS:  - Identify barriers to discharge w/patient and caregiver  - Arrange for needed discharge resources and transportation as appropriate  - Identify discharge learning needs (meds, wound care, etc )  - Arrange for interpretive services to assist at discharge as needed  - Refer to Case Management Department for coordinating discharge planning if the patient needs post-hospital services based on physician/advanced practitioner order or complex needs related to functional status, cognitive ability, or social support system  Outcome: Progressing     Problem: Knowledge Deficit  Goal: Patient/family/caregiver demonstrates understanding of disease process, treatment plan, medications, and discharge instructions  Description: Complete learning assessment and assess knowledge base    Interventions:  - Provide teaching at level of understanding  - Provide teaching via preferred learning methods  Outcome: Progressing     Problem: Potential for Falls  Goal: Patient will remain free of falls  Description: INTERVENTIONS:  - Educate patient/family on patient safety including physical limitations  - Instruct patient to call for assistance with activity   - Consult OT/PT to assist with strengthening/mobility   - Keep Call bell within reach  - Keep bed low and locked with side rails adjusted as appropriate  - Keep care items and personal belongings within reach  - Initiate and maintain comfort rounds  - Make Fall Risk Sign visible to staff  - Offer Toileting  - Initiate/Maintain alarm  - Obtain necessary fall risk management equipment  - Apply yellow socks and bracelet for high fall risk patients  - Consider moving patient to room near nurses station  Outcome: Progressing     Problem: CARDIOVASCULAR - ADULT  Goal: Maintains optimal cardiac output and hemodynamic stability  Description: INTERVENTIONS:  - Monitor I/O, vital signs and rhythm  - Monitor for S/S and trends of decreased cardiac output  - Administer and titrate ordered vasoactive medications to optimize hemodynamic stability  - Assess quality of pulses, skin color and temperature  - Assess for signs of decreased coronary artery perfusion  - Instruct patient to report change in severity of symptoms  Outcome: Progressing  Goal: Absence of cardiac dysrhythmias or at baseline rhythm  Description: INTERVENTIONS:  - Continuous cardiac monitoring, vital signs, obtain 12 lead EKG if ordered  - Administer antiarrhythmic and heart rate control medications as ordered  - Monitor electrolytes and administer replacement therapy as ordered  Outcome: Progressing     Problem: METABOLIC, FLUID AND ELECTROLYTES - ADULT  Goal: Electrolytes maintained within normal limits  Description: INTERVENTIONS:  - Monitor labs and assess patient for signs and symptoms of electrolyte imbalances  - Administer electrolyte replacement as ordered  - Monitor response to electrolyte replacements, including repeat lab results as appropriate  - Instruct patient on fluid and nutrition as appropriate  Outcome: Progressing  Goal: Fluid balance maintained  Description: INTERVENTIONS:  - Monitor labs   - Monitor I/O and WT  - Instruct patient on fluid and nutrition as appropriate  - Assess for signs & symptoms of volume excess or deficit  Outcome: Progressing  Goal: Glucose maintained within target range  Description: INTERVENTIONS:  - Monitor Blood Glucose as ordered  - Assess for signs and symptoms of hyperglycemia and hypoglycemia  - Administer ordered medications to maintain glucose within target range  - Assess nutritional intake and initiate nutrition service referral as needed  Outcome: Progressing     Problem: MUSCULOSKELETAL - ADULT  Goal: Maintain or return mobility to safest level of function  Description: INTERVENTIONS:  - Assess patient's ability to carry out ADLs; assess patient's baseline for ADL function and identify physical deficits which impact ability to perform ADLs (bathing, care of mouth/teeth, toileting, grooming, dressing, etc )  - Assess/evaluate cause of self-care deficits   - Assess range of motion  - Assess patient's mobility  - Assess patient's need for assistive devices and provide as appropriate  - Encourage maximum independence but intervene and supervise when necessary  - Involve family in performance of ADLs  - Assess for home care needs following discharge   - Consider OT consult to assist with ADL evaluation and planning for discharge  - Provide patient education as appropriate  Outcome: Progressing  Goal: Maintain proper alignment of affected body part  Description: INTERVENTIONS:  - Support, maintain and protect limb and body alignment  - Provide patient/ family with appropriate education  Outcome: Progressing

## 2021-07-20 NOTE — DISCHARGE INSTRUCTIONS
A-fib (Atrial Fibrillation)   WHAT YOU NEED TO KNOW:   A-fib may come and go, or it may be a long-term condition  A-fib can cause blood clots, stroke, or heart failure  These conditions may become life-threatening  It is important to treat and manage A-fib to help prevent a blood clot, stroke, or heart failure  DISCHARGE INSTRUCTIONS:   Call your local emergency number (911 in the [address] [facility],[address]) or have someone call if:   · You have any of the following signs of a heart attack:      ? Squeezing, pressure, or pain in your chest    ? You may  also have any of the following:     § Discomfort or pain in your back, neck, jaw, stomach, or arm    § Shortness of breath    § Nausea or vomiting    § Lightheadedness or a sudden cold sweat    · You have any of the following signs of a stroke:      ? Numbness or drooping on one side of your face     ? Weakness in an arm or leg    ? Confusion or difficulty speaking    ? Dizziness, a severe headache, or vision loss    Call your doctor or cardiologist if:   · Your arm or leg feels warm, tender, and painful  It may look swollen and red  · Your heart rate is more than 110 beats per minute  · You have new or worsening swelling in your legs, feet, ankles, or abdomen  · You are short of breath, even at rest     · You have questions or concerns about your condition or care  Medicines: You may need any of the following:  · Heart medicines  help control your heart rate or rhythm  You may need more than one medicine to treat your symptoms  · Blood thinners  help prevent blood clots  Clots can cause strokes, heart attacks, and death  The following are general safety guidelines to follow while you are taking a blood thinner:    ? Watch for bleeding and bruising while you take blood thinners  Watch for bleeding from your gums or nose  Watch for blood in your urine and bowel movements  Use a soft washcloth on your skin, and a soft toothbrush to brush your teeth   This can keep your skin and gums from bleeding  If you shave, use an electric shaver  Do not play contact sports  ? Tell your dentist and other healthcare providers that you take a blood thinner  Wear a bracelet or necklace that says you take this medicine  ? Do not start or stop any other medicines unless your healthcare provider tells you to  Many medicines cannot be used with blood thinners  ? Take your blood thinner exactly as prescribed by your healthcare provider  Do not skip does or take less than prescribed  Tell your provider right away if you forget to take your blood thinner, or if you take too much  ? Warfarin  is a blood thinner that you may need to take  The following are things you should be aware of if you take warfarin:     § Foods and medicines can affect the amount of warfarin in your blood  Do not make major changes to your diet while you take warfarin  Warfarin works best when you eat about the same amount of vitamin K every day  Vitamin K is found in green leafy vegetables and certain other foods  Ask for more information about what to eat when you are taking warfarin  § You will need to see your healthcare provider for follow-up visits when you are on warfarin  You will need regular blood tests  These tests are used to decide how much medicine you need  · Antiplatelets , such as aspirin, help prevent blood clots  Take your antiplatelet medicine exactly as directed  These medicines make it more likely for you to bleed or bruise  If you are told to take aspirin, do not take acetaminophen or ibuprofen instead  · Take your medicine as directed  Contact your healthcare provider if you think your medicine is not helping or if you have side effects  Tell him or her if you are allergic to any medicine  Keep a list of the medicines, vitamins, and herbs you take  Include the amounts, and when and why you take them  Bring the list or the pill bottles to follow-up visits   Carry your medicine list with you in case of an emergency  Manage A-fib:   · Know your target heart rate  Learn how to check your pulse and monitor your heart rate  · Know the risks if you choose to drink alcohol  Alcohol can increase your risk for A-fib or make A-fib harder to manage  Ask your healthcare provider if it is okay for you to drink any alcohol  He or she can help you set limits for the number of drinks you have in 24 hours and in a week  A drink of alcohol is 12 ounces of beer, 5 ounces of wine, or 1½ ounces of liquor  · Do not smoke  Nicotine can cause heart damage and make it more difficult to manage your A-fib  Do not use e-cigarettes or smokeless tobacco in place of cigarettes or to help you quit  They still contain nicotine  Ask your healthcare provider for information if you currently smoke and need help quitting  · Eat heart-healthy foods  Heart healthy foods will help keep your cholesterol low  These include fruits, vegetables, whole-grain breads, low-fat dairy products, beans, lean meats, and fish  Replace butter and margarine with heart-healthy oils such as olive oil and canola oil  · Maintain a healthy weight  Ask your healthcare provider what a healthy weight is for you  Ask him or her to help you create a safe weight loss plan if you are overweight  Even a small goal of a 10% weight loss can improve your heart health  · Get regular physical activity  Physical activity helps improve your heart health  Get at least 150 minutes of moderate aerobic physical activity each week  Your healthcare provider can help you create an activity plan  · Manage other health conditions  This includes high blood pressure or cholesterol, sleep apnea, diabetes, and other heart conditions  Take medicine as directed and follow your treatment plan  Your healthcare provider may need to change a medicine you are taking if it is causing your A-fib   Do not  stop taking any medicine unless directed by your provider  Follow up with your doctor or cardiologist as directed: You will need regular blood tests and monitoring  Write down your questions so you remember to ask them during your visits  © Copyright [employer] 2021 Information is for End User's use only and may not be sold, redistributed or otherwise used for commercial purposes  All illustrations and images included in CareNotes® are the copyrighted property of [employer]  or [employer] L[employer]   The above information is an  only  It is not intended as medical advice for individual conditions or treatments  Talk to your doctor, nurse or pharmacist before following any medical regimen to see if it is safe and effective for you  Chronic Hypertension   WHAT YOU NEED TO KNOW:   What is hypertension? Hypertension is high blood pressure  Your blood pressure is the force of your blood moving against the walls of your arteries  Hypertension causes your blood pressure to get so high that your heart has to work much harder than normal  This can damage your heart  Even if you have hypertension for years, lifestyle changes, medicines, or both can help bring your blood pressure to normal   What do I need to know about the stages of hypertension? · Normal blood pressure is 119/79 or lower   Your healthcare provider may only check your blood pressure each year if it stays at a normal level  · Elevated blood pressure is 120/79 to 129/79   This is sometimes called prehypertension  Your healthcare provider may suggest lifestyle changes to help lower your blood pressure to a normal level  He or she may then check it again in 3 to 6 months  · Stage 1 hypertension is 130/80  to 139/89   Your provider may recommend lifestyle changes, medication, and checks every 3 to 6 months until your blood pressure is controlled  · Stage 2 hypertension is 140/90 or higher    Your provider will recommend lifestyle changes and have you take 2 kinds of hypertension medicines  You will also need to have your blood pressure checked monthly until it is controlled  What changes may my healthcare provider make to the medicines used to treat chronic hypertension? Your healthcare provider may add, remove, or change any of the following medicines  Do not  change or stop taking any of your current medicines without talking to your provider  · Antihypertensives  may be used to help lower your blood pressure  Several kinds of medicines are available  Your healthcare provider may change the medicine or medicines you currently take  This may be needed if your blood pressure is often high when you check it at home or you are having other problems with blood pressure control  · Diuretics  help decrease extra fluid that collects in your body  This can help lower your blood pressure  You may urinate more often while you take this medicine  · Cholesterol medicine  helps lower your cholesterol level  A low cholesterol level helps prevent heart disease and makes it easier to control your blood pressure  What can I do to manage chronic hypertension? · Check your blood pressure at home  Avoid smoking, caffeine, and exercise at least 30 minutes before checking your blood pressure  Sit and rest for 5 minutes before you take your blood pressure  Extend your arm and support it on a flat surface  Your arm should be at the same level as your heart  Follow the directions that came with your blood pressure monitor  Check your blood pressure 2 times, 1 minute apart, before you take your medicine in the morning  Also check your blood pressure before your evening meal  Keep a record of your readings and bring it to your follow-up visits  Ask your healthcare provider what your blood pressure should be  · Manage any other health conditions you have  Health conditions such as diabetes can increase your risk for hypertension   Follow your healthcare provider's instructions and take all your medicines as directed  Talk to your healthcare provider about any new health conditions you have recently developed  · Ask about all medicines  Certain medicines can increase your blood pressure  Examples include oral birth control pills, decongestants, herbal supplements, and NSAIDs, such as ibuprofen  Your healthcare provider can tell you which medicines are safe for you to take  This includes prescription and over-the-counter medicines  What lifestyle changes can I make to lower my blood pressure? Your provider may want you to make more lifestyle changes if you are having trouble controlling your blood pressure  This may feel difficult over time, especially if you think you are making good changes but your pressure is still high  It might help to focus on one new change at a time  For example, try to add 1 more day of exercise, or exercise for an extra 10 minutes on 2 days  Small changes can make a big difference  Your healthcare provider can also refer you to specialists such as a dietitian who can help you make small changes  Your family members may be included in helping you learn to create lifestyle changes, such as the following:  · Limit sodium (salt) as directed  Too much sodium can affect your fluid balance  Check labels to find low-sodium or no-salt-added foods  Some low-sodium foods use potassium salts for flavor  Too much potassium can also cause health problems  Your healthcare provider will tell you how much sodium and potassium are safe for you to have in a day  He or she may recommend that you limit sodium to 2,300 mg a day  · Follow the meal plan recommended by your healthcare provider  A dietitian or your provider can give you more information on low-sodium plans or the DASH (Dietary Approaches to Stop Hypertension) eating plan  The DASH plan is low in sodium, processed sugar, unhealthy fats, and total fat  It is high in potassium, calcium, and fiber   These can be found in vegetables, fruit, and whole-grain foods  · Be physically active throughout the day  Physical activity, such as exercise, can help control your blood pressure and your weight  Be physically active for at least 30 minutes per day, on most days of the week  Include aerobic activity, such as walking or riding a bicycle  Also include strength training at least 2 times each week  Your healthcare providers can help you create a physical activity plan  · Decrease stress  This may help lower your blood pressure  Learn ways to relax, such as deep breathing or listening to music  · Limit alcohol as directed  Alcohol can increase your blood pressure  A drink of alcohol is 12 ounces of beer, 5 ounces of wine, or 1½ ounces of liquor  · Do not smoke  Nicotine and other chemicals in cigarettes and cigars can increase your blood pressure and also cause lung damage  Ask your healthcare provider for information if you currently smoke and need help to quit  E-cigarettes or smokeless tobacco still contain nicotine  Talk to your healthcare provider before you use these products  Call your local emergency number (911 in the [address] [place] [address],[address]) or have someone call if:   · You have chest pain  · You have any of the following signs of a heart attack:      ? Squeezing, pressure, or pain in your chest    ? You may  also have any of the following:     § Discomfort or pain in your back, neck, jaw, stomach, or arm    § Shortness of breath    § Nausea or vomiting    § Lightheadedness or a sudden cold sweat    · You become confused or have difficulty speaking  · You suddenly feel lightheaded or have trouble breathing  When should I seek immediate care? · You have a severe headache or vision loss  · You have weakness in an arm or leg  When should I call my doctor? · You feel faint, dizzy, confused, or drowsy      · You have been taking your blood pressure medicine but your pressure is higher than your provider says it should be  · You have questions or concerns about your condition or care  CARE AGREEMENT:   You have the right to help plan your care  Learn about your health condition and how it may be treated  Discuss treatment options with your healthcare providers to decide what care you want to receive  You always have the right to refuse treatment  The above information is an  only  It is not intended as medical advice for individual conditions or treatments  Talk to your doctor, nurse or pharmacist before following any medical regimen to see if it is safe and effective for you  © Copyright [address] 2021 Information is for End User's use only and may not be sold, redistributed or otherwise used for commercial purposes  All illustrations and images included in CareNotes® are the copyrighted property of [employer]  or [facility] Graciela Lazo     Chronic Kidney Disease   AMBULATORY CARE:   Chronic kidney disease (CKD)  is the gradual and permanent loss of kidney function  Normally, the kidneys remove fluid, chemicals, and waste from your blood  These wastes are turned into urine by your kidneys  CKD may worsen over time and lead to kidney failure  Common signs and symptoms include the following:   · Changes in how often you need to urinate    · Swelling in your arms, legs, or feet    · Shortness of breath    · Fatigue or weakness    · Bad or bitter taste in your mouth    · Nausea, vomiting, or loss of appetite    Call your local emergency number (911 in the [address] [place] [address],[address]) if:   · You have a seizure  · You have shortness of breath  Seek care immediately if:   · You are confused and very drowsy  Call your doctor or nephrologist if:   · You suddenly gain or lose more weight than your healthcare provider has told you is okay  · You have itchy skin or a rash  · You urinate more or less than you normally do  · You have blood in your urine      · You have nausea and are vomiting  · You have fatigue or muscle weakness  · You have hiccups that will not stop  · You have questions or concerns about your condition or care  How CKD is diagnosed:  CKD has 5 stages  Your healthcare provider will use results from the following tests to find the stage of CKD you have:  · Blood and urine tests  show how well your kidneys are working  They may also show the cause of your CKD  · Ultrasound, CT scan, or MRI  pictures may be used to check your kidneys  You may be given contrast liquid to help your kidneys show up better in the pictures  Tell the healthcare provider if you have ever had an allergic reaction to contrast liquid  Do not enter the MRI room with anything metal  Metal can cause serious injury  Tell the healthcare provider if you have any metal in or on your body  · A biopsy  is a procedure to remove a small piece of tissue from your kidney  It is done to find the cause of your CKD  Treatment  can help control signs and symptoms, and prevent a worse stage of CKD  Your care team may include specialists, such as a dietitian or a heart specialist  This depends on the stage of your CKD and if you have other health conditions to manage  Healthcare providers will work with you to create a plan based on your decisions for treatment  Your treatment plan may include any of the following:  · Medicines  may be given to decrease your blood pressure and get rid of extra fluid  You may also receive medicine to manage health conditions that may occur with CKD, such as anemia, diabetes, and heart disease  · Dialysis  is a treatment to remove chemicals and waste from your blood when your kidneys can no longer do this  · Surgery  may be needed to create an arteriovenous fistula (AVF) in your arm or insert a catheter into your abdomen  This is done so you can receive dialysis  · A kidney transplant  may be done if your CKD becomes severe      What you can do to manage CKD: Management may include making some lifestyle changes  Tell your healthcare provider if you have any concerns about being able to make changes  He or she can help you find solutions, including working with specialists  Ask for help creating a plan to break large goals into smaller steps  Your plan may include any of the following:  · Manage other health conditions  Your healthcare provider will work with you to make a care plan that meets your needs  You will be checked regularly for heart disease or other conditions that can make CKD worse, such as diabetes  Your blood pressure will be closely monitored  You will also get a target blood pressure and help making a plan to reach your target  This may include taking your blood pressure at home  · Maintain a healthy weight  Your weight and body mass index (BMI) will be checked regularly  BMI helps find if your weight is healthy for your height  Your healthcare provider will use other tests to check your muscle and protein levels  Extra weight can strain your kidneys  A low weight or low muscle mass can make you feel more tired  You may have trouble doing your daily activities  Ask your provider what a healthy weight is for you  He or she can help you create a plan to lose or gain weight safely, if needed  The plan may include keeping a food diary  This is a list of foods and liquids you have each day  Your provider will use the diary to help you make changes, if needed  Changes are based on your health and any other conditions you have, such as diabetes  · Create an exercise plan  Regular exercise can help you manage CKD, high blood pressure, and diabetes  Exercise also helps control weight  Your provider can help you create exercise goals and a plan to reach those goals  For example, your goal may be to exercise for 30 minutes in a day  Your plan can include breaking exercise into 10 minute sessions, 3 times during the day           · Create a healthy eating plan  Your provider may tell you to eat food low in potassium, phosphorus, or protein  Your provider may also recommend vitamin or mineral supplements  Do not take any supplements without talking to your provider  A dietitian can help you plan meals if needed  Ask how much liquid to drink each day and which liquids are best for you  · Limit sodium (salt) as directed  You may need to limit sodium to less than 2,300 milligrams (mg) each day  Ask your dietitian or healthcare provider how much sodium you can have each day  The amount depends on your stage of kidney disease  Table salt, canned foods, soups, salted snacks, and processed meats, like deli meats and sausage, are high in sodium  Your provider or a dietitian can show you how to read food labels for sodium  · Limit alcohol as directed  Alcohol can cause fluid retention and can affect your kidneys  Ask how much alcohol is safe for you  A drink of alcohol is 12 ounces of beer, 5 ounces of wine, or 1½ ounces of liquor  · Do not smoke  Nicotine and other chemicals in cigarettes and cigars can cause kidney damage  Ask your provider for information if you currently smoke and need help to quit  E-cigarettes or smokeless tobacco still contain nicotine  Talk to your provider before you use these products  · Ask about over-the-counter medicines  Medicines such as NSAIDs and laxatives may harm your kidneys  Some cough and cold medicines can raise your blood pressure  Always ask if a medicine is safe before you take it  · Ask about vaccines you may need  CKD can increase your risk for infections such as pneumonia, influenza, and hepatitis  Vaccines lower your risk for infection  Your healthcare provider will tell you which vaccines you need and when to get them  Follow up with your doctor or nephrologist as directed: You will need to return for tests to monitor your kidney and nerve function, and your parathyroid hormone level   Your medicines may be changed, based on certain test results  Write down your questions so you remember to ask them during your visits  © Copyright [employer] 2021 Information is for End User's use only and may not be sold, redistributed or otherwise used for commercial purposes  All illustrations and images included in CareNotes® are the copyrighted property of [employer]  or Regan Morse  The above information is an  only  It is not intended as medical advice for individual conditions or treatments  Talk to your doctor, nurse or pharmacist before following any medical regimen to see if it is safe and effective for you

## 2021-07-20 NOTE — OCCUPATIONAL THERAPY NOTE
Occupational Therapy Evaluation Note        Patient Name: Denice Ibrahim  VHCST'G Date: 7/20/2021 07/20/21 1116   OT Last Visit   OT Visit Date 07/20/21   Note Type   Note type Evaluation   Restrictions/Precautions   Weight Bearing Precautions Per Order No   Braces or Orthoses Other (Comment)  (none per pt)   Other Precautions Chair Alarm; Bed Alarm;Multiple lines;Telemetry; Fall Risk;Pain;Hard of hearing   Pain Assessment   Pain Assessment Tool 0-10   Pain Score 8   Pain Location/Orientation Location: Neck; Location: Back   Home Living   Type of [address] [place] [address] One level;Stairs to enter with rails; Performs ADLs on one level  (4 DILMA, 2 steps to bedroom)   Bathroom Shower/Tub Walk-in shower   Bathroom Toilet Standard   Bathroom Equipment Grab bars in [facility]   Additional Comments Patient ambulatory with a cane intermittently   Prior Function   Level of [place] Independent with ADLs and functional mobility   Lives With Other (Comment)  (girlfriend)   Receives Help From [address]   IADLs Needs assistance   Falls in the last 6 months 1 to 4  (3 falls)   Vocational Retired   Comments pt drives at [address] Per chart review and patient report, patient lives with his significant other in a one-story mobile home with 4 DILMA and 2 steps to his bedroom  At baseline, patient reports that he is independent in ADLs and receives assistance from his SO for IADLs  Patient is ambulatory with a cane intermittently     Reciprocal Relationships Supportive family   Service to Others Retired   [address] [facility], taking care of kaitlynn   Psychosocial   Psychosocial (WDL) WDL   ADL   Eating Assistance 6  Modified independent   Grooming Assistance 5  Supervision/Setup   UB Bathing Assistance 5  Supervision/Setup   LB Bathing Assistance 5  Supervision/Setup   UB Dressing Assistance 5 Supervision/Setup   LB Dressing Assistance 4  Minimal Assistance   Toileting Assistance  5  Supervision/Setup   Functional Assistance 4  Minimal Assistance   Additional Comments ADL assist levels based on pt's functional performance during OT evaluation   Bed Mobility   Rolling R 4  Minimal assistance  (CGA; log roll technique)   Additional items Assist x 1;Bedrails; Increased time required;Verbal cues   Supine to Sit 5  Supervision   Additional items Assist x 1; Increased time required; Bedrails;Verbal cues   Additional Comments Patient received lying supine in bed upon OT arrival; at end of session: pt returned lying supine in bed w/ all needs within reach and bed alarm activated   Transfers   Sit to Stand 5  Supervision   Additional items Assist x 1; Increased time required;Verbal cues   Stand to Sit 5  Supervision   Additional items Assist x 1; Increased time required;Verbal cues   Additional Comments Performed functional transfers using no AD   Functional Mobility   Functional Mobility 5  Supervision   Additional Comments x1; ambulation to and from bathroom with no overt LOB or SOB   Additional items   (no AD; u/l UE support on IV pole)   Balance   Static Sitting Good   Dynamic Sitting Fair   Static Standing Fair   Dynamic Standing Fair -   Ambulatory Fair -   Activity Tolerance   Activity Tolerance Patient tolerated treatment well   Nurse Made Aware RN Twin confirmed pt appropriate for therapy   RUE Assessment   RUE Assessment WFL  (AROM WFL; strength 4-/5 distally, proximal MMT not performed)   LUE Assessment   LUE Assessment WFL  (AROM WFL; strength 4-/5 distally, proximal MMT not performed)   Hand Function   Gross Motor Coordination Functional  (Based on pt's functional performance)   Fine Motor Coordination Functional  (Based on pt's functional performance)   Sensation   Light Touch No apparent deficits  (BUEs)   Vision-Basic Assessment   Current Vision Wears glasses only for reading   Cognition   Overall Cognitive Status Impaired   Arousal/Participation Alert; Responsive; Cooperative   Attention Within functional limits   Orientation Level Oriented to person;Oriented to place;Oriented to situation   Memory Decreased short term memory   Following Commands Follows multistep commands with increased time or repetition   Comments Patient agreeable to OT evaluation  Administered SBT at time of IE, patient scored a 16 indicating impairment consistent with dementia  Cognition Assessment Tools Other (Comment)  (Short Blessed Test)   Score 17   Assessment   Limitation Decreased UE strength;Decreased cognition;Decreased self-care trans;Decreased high-level ADLs; Decreased ADL status   Prognosis Good   Assessment Patient is a 82 y o  male seen for OT evaluation s/p admit to [address] [place] [address] on 7/19/2021 w/Near syncope  Commorbidities affecting patient's functional performance at time of assessment include: elevated troponin, type 2 DM with stage 3b CKD without long-term current use of insulin, paroxysmal atrial fibrillation, stage 3b CKD, CAD in native artery, HLD, and essential HTN  Patient  has a past medical history of Acute deep vein thrombosis (DVT) of brachial vein of left upper extremity ([facility] ) (11/24/2017), Acute deep vein thrombosis (DVT) of left peroneal vein (HCC), Acute ST elevation myocardial infarction [facility]), Aortic valve stenosis, Atrial fibrillation ([facility] ), Basilar artery stenosis, Basilar artery stenosis, Benign prostatic hyperplasia with lower urinary tract symptoms, CAD (coronary artery disease), Chronic kidney disease, Closed fracture of multiple ribs of left side with routine healing (9/3/2020), DM2 (diabetes mellitus, type 2) ([facility] ), History of transfusion, HLD (hyperlipidemia), HTN (hypertension), Middle cerebral artery stenosis, Proteinuria, Symptomatic bradycardia, Transient cerebral ischemia, and Vitamin D deficiency  Orders placed for OT evaluation and treatment   Performed at least two patient identifiers during session including name and wristband  Prior to admission, patient was living with his SO in a one-story mobile home with 4 DILMA  At baseline, patient reports that he is independent in ADLs and receives assistance for IADLs  Personal factors affecting patient at time of initial evaluation include: steps to enter, difficulty performing ADLs and difficulty performing IADLs  Upon evaluation, patient requires supervision and set up assist for UB ADLs, supervision, set up and minimal  assist for LB ADLs, transfers and functional ambulation in room and bathroom with supervision assist, with no AD  Patient is alert, oriented to name,, oriented to place, and oriented to situation,  Occupational performance is affected by the following deficits: decreased muscle strength, dynamic sit/ stand balance deficit with poor standing tolerance time for self care and functional mobility, decreased activity tolerance, impaired memory, impaired problem solving and increased pain  Therapist completed expanded review of medical records and additional review of physical, cognitive or psychosocial history, clinical examination identifying 3-5 performance deficits, clinical decision making of a moderate complexity, consistent with moderate complexity level evaluation  Patient to benefit from continued Occupational Therapy treatment while in the hospital to address deficits as defined above and maximize level of functional independence with ADLs and functional mobility  Occupational Performance areas to address include: bathing/ shower, dressing, transfer to all surfaces, functional mobility, emergency response, IADLs: safety procedures, Leisure Participation and Social participation  From OT standpoint, recommendation at time of d/c would be Home with family support       Goals   Patient Goals none expressed at time of IE   Plan   Treatment Interventions ADL retraining;Functional transfer training;UE strengthening/ROM; Patient/family training; Compensatory technique education;Continued evaluation; Activityengagement;Cognitive reorientation   Goal Expiration Date 07/30/21   OT Treatment Day 0   OT Frequency 2-3x/wk   Recommendation   OT Discharge Recommendation No rehabilitation needs   Additional Comments  The patient's raw score on the AM-PAC Daily Activity inpatient short form is 20, standardized score is 42 03, greater than 39 4  Patients at this level are likely to benefit from discharge to home  Please refer to the recommendation of the Occupational Therapist for safe discharge planning  AM-PAC Daily Activity Inpatient   Lower Body Dressing 3   Bathing 3   Toileting 3   Upper Body Dressing 3   Grooming 4   Eating 4   Daily Activity Raw Score 20   Daily Activity Standardized Score (Calc for Raw Score >=11) 42 03   AM-[facility] Applied Cognition Inpatient   Following a Speech/Presentation 4   Understanding Ordinary Conversation 4   Taking Medications 2   Remembering Where Things Are Placed or Put Away 3   Remembering List of 4-5 Errands 3   Taking Care of Complicated Tasks 2   Applied Cognition Raw Score 18   Applied Cognition Standardized Score 38 07   Barthel Index   Feeding 10   Bathing 0   Grooming Score 5   Dressing Score 5   Bladder Score 10   Bowels Score 10   Toilet Use Score 5   Transfers (Bed/Chair) Score 10   Mobility (Level Surface) Score 0   Stairs Score 0   Barthel Index Score 55   Modified [place]oyal Scale   Modified Yaniv Scale 3     Occupational Therapy Goals to be completed in 7-10 Days:    1 - Patient will verbalize and demonstrate use of energy conservation/ deep breathing technique and work simplification skills during functional activity with no verbal cues  2 - Patient will verbalize and demonstrate good body mechanics and joint protection techniques during  ADLs/ IADLs with no verbal cues      3 - Patient will increase OOB/ sitting tolerance to 2-4 hours per day for increased participation in self care and leisure tasks with no s/s of exertion  4 - Patient will increase standing tolerance time to 10 minutes with unilateral UE support to complete sink level ADLs@ mod I level  5 - Patient will increase sitting tolerance at edge of bed to 30 minutes to complete UB ADLs @ set up assist level  6 - Patient will transfer bed to Chair / toilet at Mod I assist level with AD as indicated  7 - Patient will complete UB ADLs with Mod Iassist      8 - Patient will complete LB ADLs with supervision/setup assist while seated  9 - Patient will complete toileting hygiene with Mod I assist/ supervision for thoroughness  8 - Patient/ Family will demonstrate competency with UE Home Exercise Program       11- Pt will attend to continued cognitive assessment 100% of the time in order to provide most appropriate recommendations for d/c plans  12- Patient will restore independence in bed mobility using Log Rolling technique to transfer OOB at Mod I  level       YAKOV De Los Santos/ANNA

## 2021-07-20 NOTE — DISCHARGE INSTR - AVS FIRST PAGE
Thank you for choosing [facility] for year care, please take all prescriptions as instructed, please make appropriate follow-up visits

## 2021-07-20 NOTE — PHYSICAL THERAPY NOTE
Physical Therapy Evaluation     Patient's Name: Estephanie Barrios    Admitting Diagnosis  Loss of consciousness ([facility] ) [R40 20]  Syncope [R55]  CKD (chronic kidney disease) [N18 9]  Elevated troponin [R77 8]    Problem List  Patient Active Problem List   Diagnosis    Essential hypertension    HLD (hyperlipidemia)    CAD in native artery    Mild to moderate aortic stenosis    Stage 3b chronic kidney disease ([facility] )    History of CVA (cerebrovascular accident)    Paroxysmal atrial fibrillation (HCC)    Type 2 diabetes mellitus with stage 3b chronic kidney disease, without long-term current use of insulin ([facility] )    Hypertensive kidney disease with stage 3b chronic kidney disease ([facility] )    Other proteinuria    History of DVT of peroneal vein left lower extremity    Vitamin D deficiency    Basilar artery stenosis    Cerebrovascular small vessel disease    Stage 4 chronic kidney disease ([facility] )    Lump in chest    Infected sebaceous cyst of skin    Near syncope    Elevated troponin     Past Medical History  Past Medical History:   Diagnosis Date    Acute deep vein thrombosis (DVT) of brachial vein of left upper extremity ([facility] ) 11/24/2017    Acute deep vein thrombosis (DVT) of left peroneal vein ([facility])     Acute ST elevation myocardial infarction ([facility] )     Aortic valve stenosis     Atrial fibrillation ([facility] )     Basilar artery stenosis     Basilar artery stenosis     Benign prostatic hyperplasia with lower urinary tract symptoms     CAD (coronary artery disease)     Chronic kidney disease     Closed fracture of multiple ribs of left side with routine healing 9/3/2020    DM2 (diabetes mellitus, type 2) ([facility] )     History of transfusion     HLD (hyperlipidemia)     HTN (hypertension)     Middle cerebral artery stenosis     Proteinuria     Symptomatic bradycardia     Transient cerebral ischemia     Vitamin D deficiency      Past Surgical History  Past Surgical History:   Procedure Laterality Date  CARDIAC CATHETERIZATION      Outcome: successful; last assessed: 02/03/2015    CARDIAC CATHETERIZATION  11/26/2019    CORONARY ANGIOPLASTY WITH STENT PLACEMENT  2008    stent to LAD     HAND SURGERY      thumb    HIP HARDWARE REMOVAL      TOTAL HIP ARTHROPLASTY Right     TOTAL HIP ARTHROPLASTY Bilateral       07/20/21 0758   PT Last Visit   PT Visit Date 07/20/21   Note Type   Note type Evaluation   Pain Assessment   Pain Assessment Tool 0-10   Pain Score 4   Pain Location/Orientation Orientation: Right;Location: Hip   Home Living   Type of Home Mobile home   Home Layout One level;Stairs to enter with rails  (4 DILMA; 2 steps to bedroom)   Bathroom Shower/Tub Walk-in shower   Bathroom Toilet Standard   Bathroom Equipment Grab bars in [facility] [address] Cane;Walker   Additional Comments Pt ambulates with a cane occasionally  Prior Function   Level of [place] Independent with ADLs and functional mobility   Lives With Other (Comment)  (girlfriend)   Receives Help From Family   ADL Assistance Independent   IADLs Needs assistance   Falls in the last 6 months 1 to 4  (3 falls)   Vocational Retired   Restrictions/Precautions   [employer] [place] Bearing Precautions Per Order No   Braces or Orthoses Other (Comment)  (none per patient)   Other Precautions Chair Alarm; Bed Alarm;Telemetry; Fall Risk;Pain   General   Family/Caregiver Present No   Cognition   Overall Cognitive Status   (questionable)   Arousal/Participation Alert   Orientation Level Oriented to person;Oriented to place;Oriented to situation;Disoriented to time  (oriented to year only)   Memory Decreased long term memory   Following Commands Follows multistep commands with increased time or repetition   Comments Pt agreeable to PT    RLE Assessment   RLE Assessment X   Strength RLE   RLE Overall Strength 4-/5   LLE Assessment   LLE Assessment X   Strength LLE   LLE Overall Strength 4-/5   Light Touch   RLE Light Touch Grossly intact   LLE Light Touch Grossly intact   Bed Mobility   Supine to Sit 5  Supervision   Additional items Assist x 1;HOB elevated; Increased time required;Verbal cues   Transfers   Sit to Stand 5  Supervision   Additional items Assist x 1; Increased time required;Verbal cues   Stand to Sit 5  Supervision   Additional items Assist x 1; Increased time required;Verbal cues   Ambulation/Elevation   Gait pattern Excessively slow; Step to;Short stride; Shuffling;Decreased foot clearance;Narrow RADHA; Foward flexed  (increased bilateral knee flexion flexion in stance)   Gait Assistance 4  Minimal assist  (CG assist)   Additional items Assist x 1;Verbal cues   Assistive Device None   Distance 50 feet   Stair Management Assistance Not tested   Balance   Static Sitting Good   Dynamic Sitting Fair +   Static Standing Fair   Dynamic Standing Fair -   Ambulatory Fair -   Endurance Deficit   Endurance Deficit Yes   Activity Tolerance   Activity Tolerance Patient tolerated treatment well   Nurse Made Aware Discussed case with JELENA [place]; post session pt was left seated in recliner in NAD, all belongings within reach, +chair alarm   Assessment   Prognosis Good   Problem List Decreased strength;Decreased endurance; Impaired balance;Decreased mobility; Decreased cognition;Decreased safety awareness;Pain   Assessment Pt is 82year old male seen for PT evaluation s/p admit to [facility] on 7/19/2021 with Near syncope  PT consulted to assess pt's functional mobility and d/c needs  Order placed for PT eval and tx, with up and OOB as tolerated order  Comorbidities affecting pt's physical performance at time of assessment include essential hypertension, hyperlipidemia, CAD, stage 3b CKD, paroxysmal atrial fibrillation, type 2 DM, and elevated troponin  PTA, pt was independent with all functional mobility with occasional use of a cane   Personal factors affecting pt at time of IE include stairs to enter home, inability to navigate community distances, inability to navigate level surfaces without external assistance, unable to perform dynamic tasks in community, positive fall history, and inability to perform IADLs  Please find objective findings from PT assessment regarding body systems outlined above with impairments and limitations including weakness, impaired balance, decreased endurance, gait deviations, pain, decreased activity tolerance, decreased functional mobility tolerance, decreased safety awareness, fall risk, and decreased cognition  The following objective measures performed on IE also reveal limitations: Barthel Index: 65/100 and Modified Yaniv: 4 (moderate/severe disability)  Pt's clinical presentation is currently unstable/unpredictable seen in pt's presentation of need for ongoing medical management/monitoring, pt is a fall risk, and pt requires cues/assist for safety with functional mobility  Pt to benefit from continued PT tx to address deficits as defined above and maximize level of functional independent mobility and consistency  From PT/mobility standpoint, recommendation at time of d/c would be home with family support and outpatient PT pending progress in order to facilitate return to PLOF     Barriers to Discharge Inaccessible home environment   Goals   STG Expiration Date 07/30/21   Short Term Goal #1 In 7-10 days: Increase bilateral LE strength 1/2 grade to facilitate independent mobility, Perform all bed mobility tasks modified independent to decrease caregiver burden, Perform all transfers modified independent to improve independence, Ambulate > 150 ft  with least restrictive assistive device modified independent w/o LOB and w/ normalized gait pattern 100% of the time, Navigate 4 stairs modified independent with unilateral handrail to facilitate return to previous living environment and Increase all balance 1/2 grade to decrease risk for falls   Plan   Treatment/Interventions Functional transfer training;LE strengthening/ROM; Elevations; Therapeutic exercise; Endurance training;Cognitive reorientation;Patient/family training;Bed mobility;Gait training;Spoke to nursing;OT   PT Frequency Other (Comment)  (3-5x/wk)   Recommendation   PT Discharge Recommendation Home with outpatient rehabilitation   AM-PAC Basic Mobility Inpatient   Turning in Bed Without Bedrails 4   Lying on Back to Sitting on Edge of Flat Bed 3   Moving Bed to Chair 3   Standing Up From Chair 3   Walk in Room 3   Climb 3-5 Stairs 3   Basic Mobility Inpatient Raw Score 19   Basic Mobility Standardized Score 42 48   Modified [place] Scale   Modified Yaniv Scale 4   Barthel Index   Feeding 10   Bathing 0   Grooming Score 5   Dressing Score 5   Bladder Score 10   Bowels Score 10   Toilet Use Score 5   Transfers (Bed/Chair) Score 10   Mobility (Level Surface) Score 10   Stairs Score 0   Barthel Index Score 65     Albert Arana, PT, DPT

## 2021-07-20 NOTE — PLAN OF CARE
Problem: PAIN - ADULT  Goal: Verbalizes/displays adequate comfort level or baseline comfort level  Description: Interventions:  - Encourage patient to monitor pain and request assistance  - Assess pain using appropriate pain scale  - Administer analgesics based on type and severity of pain and evaluate response  - Implement non-pharmacological measures as appropriate and evaluate response  - Consider cultural and social influences on pain and pain management  - Notify physician/advanced practitioner if interventions unsuccessful or patient reports new pain  Outcome: Progressing     Problem: INFECTION - ADULT  Goal: Absence or prevention of progression during hospitalization  Description: INTERVENTIONS:  - Assess and monitor for signs and symptoms of infection  - Monitor lab/diagnostic results  - Monitor all insertion sites, i e  indwelling lines, tubes, and drains  - Monitor endotracheal if appropriate and nasal secretions for changes in amount and color  - [place] appropriate cooling/warming therapies per order  - Administer medications as ordered  - Instruct and encourage patient and family to use good hand hygiene technique  - Identify and instruct in appropriate isolation precautions for identified infection/condition  Outcome: Progressing  Goal: Absence of fever/infection during neutropenic period  Description: INTERVENTIONS:  - Monitor WBC    Outcome: Progressing     Problem: SAFETY ADULT  Goal: Patient will remain free of falls  Description: INTERVENTIONS:  - Educate patient/family on patient safety including physical limitations  - Instruct patient to call for assistance with activity   - Consult OT/PT to assist with strengthening/mobility   - Keep Call bell within reach  - Keep bed low and locked with side rails adjusted as appropriate  - Keep care items and personal belongings within reach  - Initiate and maintain comfort rounds  - Make Fall Risk Sign visible to staff  - Offer Toileting every  Hours, in advance of need  - Initiate/Maintain alarm  - Obtain necessary fall risk management equipment:   - Apply yellow socks and bracelet for high fall risk patients  - Consider moving patient to room near nurses station  Outcome: Progressing  Goal: Maintain or return to baseline ADL function  Description: INTERVENTIONS:  -  Assess patient's ability to carry out ADLs; assess patient's baseline for ADL function and identify physical deficits which impact ability to perform ADLs (bathing, care of mouth/teeth, toileting, grooming, dressing, etc )  - Assess/evaluate cause of self-care deficits   - Assess range of motion  - Assess patient's mobility; develop plan if impaired  - Assess patient's need for assistive devices and provide as appropriate  - Encourage maximum independence but intervene and supervise when necessary  - Involve family in performance of ADLs  - Assess for home care needs following discharge   - Consider OT consult to assist with ADL evaluation and planning for discharge  - Provide patient education as appropriate  Outcome: Progressing  Goal: Maintains/Returns to pre admission functional level  Description: INTERVENTIONS:  - Perform BMAT or MOVE assessment daily    - Set and communicate daily mobility goal to care team and patient/family/caregiver  - Collaborate with rehabilitation services on mobility goals if consulted  - Perform Range of Motion  times a day  - Reposition patient every  hours    - Dangle patient  times a day  - Stand patient  times a day  - Ambulate patient  times a day  - Out of bed to chair  times a day   - Out of bed for meals times a day  - Out of bed for toileting  - Record patient progress and toleration of activity level   Outcome: Progressing     Problem: DISCHARGE PLANNING  Goal: Discharge to home or other facility with appropriate resources  Description: INTERVENTIONS:  - Identify barriers to discharge w/patient and caregiver  - Arrange for needed discharge resources and transportation as appropriate  - Identify discharge learning needs (meds, wound care, etc )  - Arrange for interpretive services to assist at discharge as needed  - Refer to Case Management Department for coordinating discharge planning if the patient needs post-hospital services based on physician/advanced practitioner order or complex needs related to functional status, cognitive ability, or social support system  Outcome: Progressing     Problem: Knowledge Deficit  Goal: Patient/family/caregiver demonstrates understanding of disease process, treatment plan, medications, and discharge instructions  Description: Complete learning assessment and assess knowledge base    Interventions:  - Provide teaching at level of understanding  - Provide teaching via preferred learning methods  Outcome: Progressing     Problem: Potential for Falls  Goal: Patient will remain free of falls  Description: INTERVENTIONS:  - Educate patient/family on patient safety including physical limitations  - Instruct patient to call for assistance with activity   - Consult OT/PT to assist with strengthening/mobility   - Keep Call bell within reach  - Keep bed low and locked with side rails adjusted as appropriate  - Keep care items and personal belongings within reach  - Initiate and maintain comfort rounds  - Make Fall Risk Sign visible to staff  - Offer Toileting every  Hours, in advance of need  - Initiate/Maintain alarm  - Obtain necessary fall risk management equipment:   - Apply yellow socks and bracelet for high fall risk patients  - Consider moving patient to room near nurses station  Outcome: Progressing     Problem: CARDIOVASCULAR - ADULT  Goal: Maintains optimal cardiac output and hemodynamic stability  Description: INTERVENTIONS:  - Monitor I/O, vital signs and rhythm  - Monitor for S/S and trends of decreased cardiac output  - Administer and titrate ordered vasoactive medications to optimize hemodynamic stability  - Assess quality of pulses, skin color and temperature  - Assess for signs of decreased coronary artery perfusion  - Instruct patient to report change in severity of symptoms  Outcome: Progressing  Goal: Absence of cardiac dysrhythmias or at baseline rhythm  Description: INTERVENTIONS:  - Continuous cardiac monitoring, vital signs, obtain 12 lead EKG if ordered  - Administer antiarrhythmic and heart rate control medications as ordered  - Monitor electrolytes and administer replacement therapy as ordered  Outcome: Progressing     Problem: METABOLIC, FLUID AND ELECTROLYTES - ADULT  Goal: Electrolytes maintained within normal limits  Description: INTERVENTIONS:  - Monitor labs and assess patient for signs and symptoms of electrolyte imbalances  - Administer electrolyte replacement as ordered  - Monitor response to electrolyte replacements, including repeat lab results as appropriate  - Instruct patient on fluid and nutrition as appropriate  Outcome: Progressing  Goal: Fluid balance maintained  Description: INTERVENTIONS:  - Monitor labs   - Monitor I/O and WT  - Instruct patient on fluid and nutrition as appropriate  - Assess for signs & symptoms of volume excess or deficit  Outcome: Progressing  Goal: Glucose maintained within target range  Description: INTERVENTIONS:  - Monitor Blood Glucose as ordered  - Assess for signs and symptoms of hyperglycemia and hypoglycemia  - Administer ordered medications to maintain glucose within target range  - Assess nutritional intake and initiate nutrition service referral as needed  Outcome: Progressing     Problem: MUSCULOSKELETAL - ADULT  Goal: Maintain or return mobility to safest level of function  Description: INTERVENTIONS:  - Assess patient's ability to carry out ADLs; assess patient's baseline for ADL function and identify physical deficits which impact ability to perform ADLs (bathing, care of mouth/teeth, toileting, grooming, dressing, etc )  - Assess/evaluate cause of self-care deficits - Assess range of motion  - Assess patient's mobility  - Assess patient's need for assistive devices and provide as appropriate  - Encourage maximum independence but intervene and supervise when necessary  - Involve family in performance of ADLs  - Assess for home care needs following discharge   - Consider OT consult to assist with ADL evaluation and planning for discharge  - Provide patient education as appropriate  Outcome: Progressing  Goal: Maintain proper alignment of affected body part  Description: INTERVENTIONS:  - Support, maintain and protect limb and body alignment  - Provide patient/ family with appropriate education  Outcome: Progressing     Problem: MOBILITY - ADULT  Goal: Maintain or return to baseline ADL function  Description: INTERVENTIONS:  -  Assess patient's ability to carry out ADLs; assess patient's baseline for ADL function and identify physical deficits which impact ability to perform ADLs (bathing, care of mouth/teeth, toileting, grooming, dressing, etc )  - Assess/evaluate cause of self-care deficits   - Assess range of motion  - Assess patient's mobility; develop plan if impaired  - Assess patient's need for assistive devices and provide as appropriate  - Encourage maximum independence but intervene and supervise when necessary  - Involve family in performance of ADLs  - Assess for home care needs following discharge   - Consider OT consult to assist with ADL evaluation and planning for discharge  - Provide patient education as appropriate  Outcome: Progressing  Goal: Maintains/Returns to pre admission functional level  Description: INTERVENTIONS:  - Perform BMAT or MOVE assessment daily    - Set and communicate daily mobility goal to care team and patient/family/caregiver  - Collaborate with rehabilitation services on mobility goals if consulted  - Perform Range of Motion  times a day  - Reposition patient every  hours    - Dangle patient times a day  - Stand patient  times a day  - Ambulate patient  times a day  - Out of bed to chair times a day   - Out of bed for meals  times a day  - Out of bed for toileting  - Record patient progress and toleration of activity level   Outcome: Progressing

## 2021-07-20 NOTE — ASSESSMENT & PLAN NOTE
· Patient with history of CAD, status post PCI in the past, not a good candidate for CABG per review of outpatient cardiology records  · Troponin 0 05/0 04, trending down  · Continue Plavix, isosorbide and metoprolol

## 2021-07-21 ENCOUNTER — OFFICE VISIT (OUTPATIENT)
Dept: SURGERY | Facility: CLINIC | Age: 86
End: 2021-07-21
Payer: MEDICARE

## 2021-07-21 ENCOUNTER — TRANSITIONAL CARE MANAGEMENT (OUTPATIENT)
Dept: INTERNAL MEDICINE CLINIC | Facility: CLINIC | Age: 86
End: 2021-07-21

## 2021-07-21 VITALS
DIASTOLIC BLOOD PRESSURE: 78 MMHG | TEMPERATURE: 97.2 F | WEIGHT: 210 LBS | HEART RATE: 81 BPM | HEIGHT: 70 IN | SYSTOLIC BLOOD PRESSURE: 136 MMHG | BODY MASS INDEX: 30.06 KG/M2

## 2021-07-21 DIAGNOSIS — R22.2 LUMP IN CHEST: Primary | ICD-10-CM

## 2021-07-21 PROCEDURE — 99213 OFFICE O/P EST LOW 20 MIN: CPT | Performed by: STUDENT IN AN ORGANIZED HEALTH CARE EDUCATION/TRAINING PROGRAM

## 2021-07-21 NOTE — PROGRESS NOTES
Assessment/Plan:  14-year-old male with infected sebaceous cyst of chest status post incision and drainage 6/1  -  Wound is samia and healing well with good granulation tissue  - no signs of infection  - patient was recently admitted to the hospital due to a fall,  He is following up with his primary care physician soon  - also recommended patient follow-up his primary care physician and regards to a rash on his left chest  - patient is having some issues with walking and weakness, recommend discussion with his primary care physician but I did state physical therapy would benefit him  - continue daily packing changes with gauze and dry dressing  - follow-up in office in 3 weeks for wound check       1  Lump in chest               Subjective:      Patient ID: Tonya Terry is a 82 y o  male  Triage Notes:     Patient is an 14-year-old male who presents to office for evaluation status post incision and drainage of an infected sebaceous cyst of chest   Patient was recently admitted to the hospital due to a fall  He states he feels weak in his legs  He is seeing his primary care physician soon  Per his daughter he was recommended to have physical therapy before but deferred  The following portions of the patient's history were reviewed and updated as appropriate: allergies, current medications, past family history, past medical history, past social history, past surgical history and problem list     Review of Systems   Constitutional: Positive for fatigue  Negative for chills and fever  HENT: Negative for congestion, hearing loss, rhinorrhea and sore throat  Eyes: Negative for pain and discharge  Respiratory: Negative for cough, chest tightness and shortness of breath  Cardiovascular: Negative for chest pain and palpitations  Gastrointestinal: Negative for abdominal pain, constipation, diarrhea, nausea and vomiting  Endocrine: Negative for cold intolerance and heat intolerance  Genitourinary: Negative for difficulty urinating and dysuria  Musculoskeletal: Positive for gait problem  Negative for neck pain  Skin: Negative for color change and rash  Allergic/Immunologic: Negative for environmental allergies and food allergies  Neurological: Negative for seizures and headaches  Hematological: Negative for adenopathy  Does not bruise/bleed easily  Psychiatric/Behavioral: Negative for confusion and hallucinations  Objective:      /78   Pulse 81   Temp (!) 97 2 °F (36 2 °C)   Ht 5' 10" (1 778 m)   Wt 95 3 kg (210 lb)   BMI 30 13 kg/m²     Below is the patient's most recent value for Albumin, ALT, AST, BUN, Calcium, Chloride, Cholesterol, CO2, Creatinine, GFR, Glucose, HDL, Hematocrit, Hemoglobin, Hemoglobin A1C, LDL, Magnesium, Phosphorus, Platelets, Potassium, PSA, Sodium, Triglycerides, and WBC  Lab Results   Component Value Date    ALT 16 07/19/2021    AST 22 07/19/2021    BUN 34 (H) 07/20/2021    CALCIUM 8 9 07/20/2021    CL 99 (L) 07/20/2021    CHOL 190 12/01/2015    CO2 27 07/20/2021    CREATININE 1 72 (H) 07/20/2021    HDL 43 03/08/2021    HCT 38 3 07/19/2021    HGB 12 5 07/19/2021    HGBA1C 6 8 (H) 07/09/2021    MG 2 2 01/30/2020    PHOS 3 6 04/21/2021     07/19/2021    K 4 0 07/20/2021    PSA 5 8 (H) 10/29/2019     12/01/2015    TRIG 105 03/08/2021    WBC 5 78 07/19/2021     Note: for a comprehensive list of the patient's lab results, access the Results Review activity  Physical Exam  Constitutional:       Appearance: Normal appearance  HENT:      Head: Normocephalic and atraumatic  Nose: Nose normal    Eyes:      General: No scleral icterus  Conjunctiva/sclera: Conjunctivae normal    Cardiovascular:      Rate and Rhythm: Normal rate  Pulmonary:      Effort: Pulmonary effort is normal    Musculoskeletal:         General: No signs of injury  Skin:     General: Skin is warm  Coloration: Skin is not jaundiced  Comments: Incision on chest packing removed and wound cleansed, base with good granulation tissue and samia well, no signs of infection   Neurological:      General: No focal deficit present  Mental Status: He is alert and oriented to person, place, and time     Psychiatric:         Mood and Affect: Mood normal          Behavior: Behavior normal

## 2021-07-22 ENCOUNTER — OFFICE VISIT (OUTPATIENT)
Dept: INTERNAL MEDICINE CLINIC | Facility: CLINIC | Age: 86
End: 2021-07-22
Payer: MEDICARE

## 2021-07-22 VITALS
SYSTOLIC BLOOD PRESSURE: 108 MMHG | DIASTOLIC BLOOD PRESSURE: 62 MMHG | HEIGHT: 70 IN | OXYGEN SATURATION: 98 % | BODY MASS INDEX: 30.64 KG/M2 | HEART RATE: 99 BPM | WEIGHT: 214 LBS | TEMPERATURE: 97.9 F

## 2021-07-22 DIAGNOSIS — I48.0 PAROXYSMAL ATRIAL FIBRILLATION (HCC): ICD-10-CM

## 2021-07-22 DIAGNOSIS — M54.31 SCIATICA, RIGHT SIDE: ICD-10-CM

## 2021-07-22 DIAGNOSIS — N18.32 TYPE 2 DIABETES MELLITUS WITH STAGE 3B CHRONIC KIDNEY DISEASE, WITHOUT LONG-TERM CURRENT USE OF INSULIN (HCC): Primary | Chronic | ICD-10-CM

## 2021-07-22 DIAGNOSIS — E11.9 TYPE 2 DIABETES MELLITUS WITHOUT COMPLICATION, WITHOUT LONG-TERM CURRENT USE OF INSULIN (HCC): ICD-10-CM

## 2021-07-22 DIAGNOSIS — B02.9 HERPES ZOSTER WITHOUT COMPLICATION: ICD-10-CM

## 2021-07-22 DIAGNOSIS — I95.2 HYPOTENSION DUE TO DRUGS: ICD-10-CM

## 2021-07-22 DIAGNOSIS — E78.2 MIXED HYPERLIPIDEMIA: ICD-10-CM

## 2021-07-22 DIAGNOSIS — E11.22 TYPE 2 DIABETES MELLITUS WITH STAGE 3B CHRONIC KIDNEY DISEASE, WITHOUT LONG-TERM CURRENT USE OF INSULIN (HCC): Primary | Chronic | ICD-10-CM

## 2021-07-22 DIAGNOSIS — I10 ESSENTIAL HYPERTENSION: ICD-10-CM

## 2021-07-22 PROCEDURE — 99496 TRANSJ CARE MGMT HIGH F2F 7D: CPT | Performed by: PHYSICIAN ASSISTANT

## 2021-07-22 RX ORDER — LISINOPRIL 20 MG/1
20 TABLET ORAL DAILY
Qty: 90 TABLET | Refills: 3 | Status: SHIPPED | OUTPATIENT
Start: 2021-07-22 | End: 2021-11-23 | Stop reason: SDUPTHER

## 2021-07-22 NOTE — PROGRESS NOTES
Assessment/Plan:  Blood pressure a bit low not orthostatic will cut back lisinopril from 40-20  He has a rash of shingles left C5 that is now vesicular and maturing  Will forego antivirals or steroid he has some itching but no pain  Will follow-up next week    No problem-specific Assessment & Plan notes found for this encounter  Diagnoses and all orders for this visit:    Type 2 diabetes mellitus with stage 3b chronic kidney disease, without long-term current use of insulin (HCC)    Essential hypertension  -     lisinopril (ZESTRIL) 20 mg tablet; Take 1 tablet (20 mg total) by mouth daily    Mixed hyperlipidemia    Paroxysmal atrial fibrillation (HCC)    Sciatica, right side    Type 2 diabetes mellitus without complication, without long-term current use of insulin (HCC)    Herpes zoster without complication    Hypotension due to drugs        Subjective:     Patient ID: Clement Osgood is a 82 y o  male  He was hospitalized overnight 3 days ago he had near-syncope and nearly fell with position change was brought to the hospital found have orthostatic hypotension his Cardura was discontinued and he received IV fluids  Since then continues to feel weak sluggish a bit dizzy  Ongoing problem with pain in his right hip region felt to be radicular he has had hip replacements also has been having an ongoing problem with pain in the right hamstring region also possibly radicular complaining of a rash beginning last week left shoulder area somewhat itchy felt it might be due to his detergent  Over the past week has had general decline in his activity and his overall feeling of well being  He has chronic renal failure CKD 3 followed by Nephrology unchanged during this hospitalization  A recent skin abscess removed on his anterior chest wall eye surgery still has drains this is not bothering him  History of DVT on Eliquis  Diabetic on oral agents sugars have been in the 100s no hypoglycemia    Had a CVA 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 this has improved this occurred after his  2nd hip surgery  Currently complaining of some pain in his right hip when he walks or moves but otherwise is comfortable but fatigued no chest pain palpitations dizziness or shortness of breath      The following portions of the patient's history were reviewed and updated as appropriate:  He  has a past medical history of Acute deep vein thrombosis (DVT) of brachial vein of left upper extremity ([facility] ) (11/24/2017), Acute deep vein thrombosis (DVT) of left peroneal vein ([facility]), Acute ST elevation myocardial infarction [facility]), Aortic valve stenosis, Atrial fibrillation ([place] [address] ), Basilar artery stenosis, Basilar artery stenosis, Benign prostatic hyperplasia with lower urinary tract symptoms, CAD (coronary artery disease), Chronic kidney disease, Closed fracture of multiple ribs of left side with routine healing (9/3/2020), DM2 (diabetes mellitus, type 2) ([place] [address] ), History of transfusion, HLD (hyperlipidemia), HTN (hypertension), Middle cerebral artery stenosis, Proteinuria, Symptomatic bradycardia, Transient cerebral ischemia, and Vitamin D deficiency    He   Patient Active Problem List    Diagnosis Date Noted    Near syncope 07/19/2021    Elevated troponin 07/19/2021    Infected sebaceous cyst of skin 06/08/2021    Lump in chest 06/01/2021    Stage 4 chronic kidney disease ([facility] ) 04/29/2021    Cerebrovascular small vessel disease 11/12/2020    Basilar artery stenosis 05/04/2020    Vitamin D deficiency 02/05/2020    History of DVT of peroneal vein left lower extremity 12/16/2019    Hypertensive kidney disease with stage 3b chronic [facility] disease [facility] ) 10/08/2019    Other proteinuria 10/08/2019    Type 2 diabetes mellitus with stage 3b chronic kidney disease, without long-term current use of insulin ([facility] ) 06/27/2019    Paroxysmal atrial fibrillation ([facility] ) 03/04/2019    History of CVA (cerebrovascular accident) 02/21/2019    Stage 3b chronic kidney disease ([facility] ) 12/04/2018    Mild to moderate aortic stenosis 10/09/2018    Essential hypertension 11/24/2017    HLD (hyperlipidemia) 11/24/2017    CAD in native artery 11/24/2017     He  has a past surgical history that includes Total hip arthroplasty (Right); Hip hardware removal; Cardiac catheterization; Coronary angioplasty with stent (2008); Hand surgery; Total hip arthroplasty (Bilateral); and Cardiac catheterization (11/26/2019)  His family history includes Emphysema in his father and sister  He  reports that he has never smoked  He has never used smokeless tobacco  He reports that he does not drink alcohol and does not use drugs    Current Outpatient Medications   Medication Sig Dispense Refill    amLODIPine (NORVASC) 10 mg tablet TAKE 1 TABLET BY MOUTH  DAILY (Patient taking differently: 10 mg ) 90 tablet 3    apixaban (ELIQUIS) 2 5 mg Take 1 tablet (2 5 mg total) by mouth 2 (two) times a day 180 tablet 3    Blood Glucose Monitoring Suppl (ONE TOUCH ULTRA MINI) w/Device KIT Use daily 1 kit 0    cholecalciferol (VITAMIN D3) 1,000 units tablet Take 1,000 Units by mouth daily      clopidogrel (PLAVIX) 75 mg tablet Take 1 tablet (75 mg total) by mouth daily 90 tablet 3    glimepiride (AMARYL) 1 mg tablet Take 1 tablet (1 mg total) by mouth daily with breakfast 90 tablet 3    glucose blood (ONE TOUCH ULTRA TEST) test strip Test once daily 100 each 5    lisinopril (ZESTRIL) 20 mg tablet Take 1 tablet (20 mg total) by mouth daily 90 tablet 3    metoprolol tartrate (LOPRESSOR) 25 mg tablet Take 1 tablet (25 mg total) by mouth every 12 (twelve) hours (Patient taking differently: Take 25 mg by mouth every 12 (twelve) hours Take 12 5mg daily) 180 tablet 3    Omega-3 Fatty Acids (FISH OIL CONCENTRATE PO) Take 1 tablet by mouth daily      vitamin B-12 (CYANOCOBALAMIN) 100 MCG tablet Take 1,000 mcg by mouth daily      gabapentin (NEURONTIN) 100 mg capsule Take 1 capsule (100 mg total) by mouth 2 (two) times a day 180 capsule 3    isosorbide dinitrate (ISORDIL) 10 mg tablet Take 1 tablet (10 mg total) by mouth 2 (two) times a day 180 tablet 3     No current facility-administered medications for this visit  He is allergic to celecoxib, hydromorphone, pravastatin, and statins       Review of Systems   Constitutional: Positive for fatigue  Negative for chills and fever  HENT: Negative for ear pain and sore throat  Eyes: Negative for pain and visual disturbance  Respiratory: Negative for cough and shortness of breath  Cardiovascular: Negative for chest pain and palpitations  Gastrointestinal: Negative for abdominal pain and vomiting  Genitourinary: Negative for dysuria and hematuria  Musculoskeletal: Positive for arthralgias  Negative for back pain  Skin: Positive for rash  Negative for color change  Neurological: Positive for light-headedness  Negative for seizures and syncope  All other systems reviewed and are negative  Objective:     Physical Exam  Constitutional:       Appearance: He is well-developed  He is obese  HENT:      Head: Normocephalic  Right Ear: Tympanic membrane and external ear normal       Left Ear: Tympanic membrane and external ear normal       Nose: Nose normal       Mouth/Throat:      Mouth: Mucous membranes are moist    Eyes:      Extraocular Movements: Extraocular movements intact  Conjunctiva/sclera: Conjunctivae normal       Pupils: Pupils are equal, round, and reactive to light  Neck:      Thyroid: No thyromegaly  Vascular: No carotid bruit  Cardiovascular:      Rate and Rhythm: Normal rate and regular rhythm  Heart sounds: Murmur heard  Pulmonary:      Effort: Pulmonary effort is normal  No respiratory distress  Breath sounds: Normal breath sounds  No wheezing  Comments:  Wound covered with a bandage right upper chest  Abdominal:      General: Abdomen is flat   Bowel sounds are normal       Palpations: Abdomen is soft  Musculoskeletal:         General: No swelling  Normal range of motion  Cervical back: Normal range of motion and neck supple  Lymphadenopathy:      Cervical: No cervical adenopathy  Skin:     General: Skin is warm and dry  Coloration: Skin is not jaundiced  Findings: Rash ( pretty extensive vesicular rash C5 dermatome posterior and anterior) present  Neurological:      Mental Status: He is alert and oriented to person, place, and time  Gait: Gait abnormal ( stooped stiff slow walks with a cane)  Deep Tendon Reflexes: Reflexes are normal and symmetric  Psychiatric:         Thought Content: Thought content normal          Judgment: Judgment normal            Vitals:    07/22/21 1521   BP: 108/62   BP Location: Left arm   Patient Position: Sitting   Cuff Size: Adult   Pulse: 99   Temp: 97 9 °F (36 6 °C)   TempSrc: Tympanic   SpO2: 98%   Weight: 97 1 kg (214 lb)   Height: 5' 10" (1 778 m)       Transitional Care Management Review:  Charly Renee is a 82 y o  male here for TCM follow up  During the TCM phone call patient stated:    TCM Call (since 6/21/2021)     Date and time call was made  7/21/2021 11:27 AM    Hospital care reviewed  Records reviewed    Patient was hospitialized at  [facility]- 07/20/2021    Date of Admission  07/19/21    Date of discharge  07/20/21    Diagnosis  syncope / Afib     Disposition  Home    Were the patients medications reviewed and updated  Yes    Current Symptoms  None      TCM Call (since 6/21/2021)     Post hospital issues  None (Comment)  just check on med changes- doesnt feel quit right has follow up with Simin     Scheduled for follow up?   Yes    Referrals needed  Dr Cleo Olvera - cyst follow up     Did you obtain your prescribed medications  Yes    Do you need help managing your prescriptions or medications  No    Is transportation to your appointment needed  No    I have advised the patient to call PCP with any new or worsening symptoms  [address] [place] [address]     Are you recieving any outpatient services  No    Are you recieving home care services  No    Are you using any community resources  No    Current waiver services  No    Have you fallen in the last 12 months  No    Interperter language line needed  No              Lizet Chiu PA-C

## 2021-07-24 LAB
BACTERIA BLD CULT: NORMAL
BACTERIA BLD CULT: NORMAL

## 2021-07-27 LAB
ATRIAL RATE: 250 BPM
ATRIAL RATE: 288 BPM
QRS AXIS: 83 DEGREES
QRS AXIS: 83 DEGREES
QRSD INTERVAL: 142 MS
QRSD INTERVAL: 144 MS
QT INTERVAL: 436 MS
QT INTERVAL: 446 MS
QTC INTERVAL: 467 MS
QTC INTERVAL: 493 MS
T WAVE AXIS: -29 DEGREES
T WAVE AXIS: 15 DEGREES
VENTRICULAR RATE: 66 BPM
VENTRICULAR RATE: 77 BPM

## 2021-07-27 PROCEDURE — 93010 ELECTROCARDIOGRAM REPORT: CPT | Performed by: INTERNAL MEDICINE

## 2021-07-28 ENCOUNTER — OFFICE VISIT (OUTPATIENT)
Dept: INTERNAL MEDICINE CLINIC | Facility: CLINIC | Age: 86
End: 2021-07-28
Payer: MEDICARE

## 2021-07-28 VITALS
WEIGHT: 209.4 LBS | TEMPERATURE: 97.5 F | BODY MASS INDEX: 29.98 KG/M2 | SYSTOLIC BLOOD PRESSURE: 118 MMHG | HEART RATE: 67 BPM | DIASTOLIC BLOOD PRESSURE: 78 MMHG | OXYGEN SATURATION: 97 % | HEIGHT: 70 IN

## 2021-07-28 DIAGNOSIS — I95.2 HYPOTENSION DUE TO DRUGS: Primary | ICD-10-CM

## 2021-07-28 DIAGNOSIS — B02.9 HERPES ZOSTER WITHOUT COMPLICATION: ICD-10-CM

## 2021-07-28 PROCEDURE — 99214 OFFICE O/P EST MOD 30 MIN: CPT | Performed by: FAMILY MEDICINE

## 2021-07-28 NOTE — PROGRESS NOTES
FOLLOW-UP OFFICE VISIT  [facility] - [facility] [address] [place] [address]    NAME: Tonya Terry  AGE: 82 y o  SEX: male  : 1935     DATE: 2021     Assessment and Plan:     Problem List Items Addressed This Visit        Other    Herpes zoster without complication      Other Visit Diagnoses     Hypotension due to drugs    -  Primary          Plan:  BP improved today  Based on age could permit higher blood pressures however patient does have significant  Cardiovascular disease  So will keep current regimen as it is: lisinopril 20 mg, Norvasc 10 mg, and Lopressor 25 mg b i d  isosorbide dinitrate 10 mg b i d  If blood pressures down trend once more  Would consider removing lisinopril therapy altogether  Shingles rash is resolving  Overall symptomatically it has been mild     Educated patient on possible manifestation  Of post herpetic neuralgia despite mild  Course  Encouraged to follow-up PCP if he starts to have symptoms of post herpetic  neuralgia        Return in about 4 weeks (around 2021) for Recheck/hypotension   Chief Complaint:     Chief Complaint   Patient presents with    Follow-up     1 week        History of Present Illness:       82-year-old male past medical history listed below presents for BP follow-up  Patient had an episode of near-syncope found to be orthostatic  Medications have been adjusted  Presents today with daughter        Last 5 BP BP Readings from Last 5 Encounters:   21 118/78   21 108/62   21 136/78   21 150/91   21 114/58      Last Lipids Lab Results   Component Value Date    CHOL 190 2015    CHOL 210 2015     Lab Results   Component Value Date    HDL 43 2021    HDL 50 2020     No components found for: LDLCAL  Lab Results   Component Value Date    TRIG 105 2021    TRIG 109 2020     No results found for: CHOLHDL   Current Meds Lisinopril 20 and Lopressor 25 mg bid Daughter says this visiting nurse to come to the home 3 times a week  Visiting nurses been she checking blood pressures and says that they have been non concerning per nurse  Diagnosis shingles last visit  Patient has been relatively asymptomatic outside of pruritus along the area of the rash  Denies symptoms of post herpetic neuralgia at this time  Review of Systems:     Review of Systems   Constitutional: Negative for appetite change, chills and fever  Respiratory: Negative for shortness of breath  Cardiovascular: Negative for chest pain  Skin: Positive for rash  Neurological: Negative for dizziness, syncope, light-headedness and headaches  Problem List:     Patient Active Problem List   Diagnosis    Essential hypertension    HLD (hyperlipidemia)    CAD in native artery    Mild to moderate aortic stenosis    Stage 3b chronic [facility] disease ([facility] )    History of CVA (cerebrovascular accident)    Paroxysmal atrial fibrillation (HCC)    Type 2 diabetes mellitus with stage 3b chronic kidney disease, without long-term current use of insulin [facility]    Hypertensive kidney disease with stage 3b chronic kidney disease [facility] )    Other proteinuria    History of DVT of peroneal vein left lower extremity    Vitamin D deficiency    Basilar artery stenosis    Cerebrovascular small vessel disease    Stage 4 chronic kidney disease [facility] )    Lump in chest    Infected sebaceous cyst of skin    Near syncope    Elevated troponin    Herpes zoster without complication        Objective:     /78 (BP Location: Left arm, Patient Position: Sitting, Cuff Size: Standard)   Pulse 67   Temp 97 5 °F (36 4 °C) (Temporal) Comment: no nsaids  Ht 5' 10" (1 778 m)   Wt 95 kg (209 lb 6 4 oz)   SpO2 97%   BMI 30 05 kg/m²     Physical Exam  HENT:      Head: Normocephalic  Eyes:      Conjunctiva/sclera: Conjunctivae normal    Cardiovascular:      Rate and Rhythm: Normal rate     Pulmonary: Effort: Pulmonary effort is normal       Breath sounds: Normal breath sounds  Musculoskeletal:      Right lower leg: No edema  Left lower leg: No edema  Skin:     Capillary Refill: Capillary refill takes less than 2 seconds  Findings: Rash (Shingles rash over left anterior chest wall and extending over the shoulder to the upper left back just above the scapula late  Erythematous, crusting and scabbing over  No closed vesicles noted ) present  Neurological:      Mental Status: He is alert  Psychiatric:         Mood and Affect: Mood normal          Pertinent Laboratory/Diagnostic Studies:    Laboratory Results: I have personally reviewed the pertinent laboratory results/reports       Radiology/Other Diagnostic Testing Results: I have personally reviewed pertinent reports          Raquel GILLILAND [facility]  7/29/2021 3:05 PM

## 2021-07-28 NOTE — PATIENT INSTRUCTIONS
Keep blood pressure medications as is for now  Continue to monitor blood pressures occasionally  Any thing less than 110 on top and less than 70 on the bottom- call the office  You can put topical anti-itch cream on the shingles rash  Post herpetic neuralgia, as discussed, may still occur

## 2021-07-30 ENCOUNTER — TELEPHONE (OUTPATIENT)
Dept: CARDIOLOGY CLINIC | Facility: CLINIC | Age: 86
End: 2021-07-30

## 2021-07-30 NOTE — TELEPHONE ENCOUNTER
Please set up a two week supply of eliquis 2 5mg   for pt to  for Monday afternoon at [facility]

## 2021-07-30 NOTE — TELEPHONE ENCOUNTER
Spoke with pt daughter she has not heard back from eliquis assistance yet  She did pay for a 2 week supply of eliquis but stated it was very expensive and pt cannot afford to pay   She stated she will need refills until a final discission is made for th assistance if not maybe an alternative

## 2021-07-30 NOTE — TELEPHONE ENCOUNTER
Pt daughter understood if no decision Is made by 1 week pt will be switched to coumadin  Samples will be at the [address]

## 2021-07-30 NOTE — TELEPHONE ENCOUNTER
Patient daughter called and she stated that she filled out and returned the form for the Eliquis but it is too expensive and she would like to step to take  She is going to need more of the medication in 2 weeks

## 2021-07-30 NOTE — TELEPHONE ENCOUNTER
Please get patient 2 more weeks of Eliquis  If in 1 week and no decision made on patient assistance, we will need to switch him to warfarin  Please let them know so that Rica Gates  can follow through

## 2021-08-02 NOTE — TELEPHONE ENCOUNTER
2 Boxes (14 tablets each box) of Eliquis 2 5 mg at  in [facility] for patient to

## 2021-08-04 ENCOUNTER — TELEPHONE (OUTPATIENT)
Dept: NEPHROLOGY | Facility: CLINIC | Age: 86
End: 2021-08-04

## 2021-08-04 ENCOUNTER — TELEPHONE (OUTPATIENT)
Dept: INTERNAL MEDICINE CLINIC | Facility: CLINIC | Age: 86
End: 2021-08-04

## 2021-08-04 DIAGNOSIS — B02.9 HERPES ZOSTER WITHOUT COMPLICATION: Primary | ICD-10-CM

## 2021-08-04 NOTE — TELEPHONE ENCOUNTER
Patient's daughter called  She needed to reschedule pt's appt with Dr Florian Done (8/31/21)  She had a scheduling conflict  Nova Garcia the next available (12/7/21 at 12:30), but she requested a morning appt  Offered 12/8/21 at 10am which she accepted  She acknowledged that he will get his labwork done the week before

## 2021-08-04 NOTE — TELEPHONE ENCOUNTER
Patient's daughter Edgard Lindquist calling,  Patient has shingles wants to know if scratching is one of the side effects? Because it's itching like crazy       if a cream or something can used  Gabriela Contreras

## 2021-08-05 RX ORDER — TRIAMCINOLONE ACETONIDE 1 MG/G
CREAM TOPICAL 2 TIMES DAILY
Qty: 30 G | Refills: 0 | Status: SHIPPED | OUTPATIENT
Start: 2021-08-05 | End: 2021-08-09 | Stop reason: SDUPTHER

## 2021-08-09 DIAGNOSIS — B02.9 HERPES ZOSTER WITHOUT COMPLICATION: ICD-10-CM

## 2021-08-09 RX ORDER — TRIAMCINOLONE ACETONIDE 1 MG/G
CREAM TOPICAL 2 TIMES DAILY
Qty: 30 G | Refills: 0 | Status: SHIPPED | OUTPATIENT
Start: 2021-08-09 | End: 2021-11-23 | Stop reason: CLARIF

## 2021-08-09 NOTE — TELEPHONE ENCOUNTER
Pt's daughter called and states she filled out he Eliquis assistance paperwork and dropped it off to [employer] last week  She is checking on the status of this  States he does not want to go on Warfarin if he can get approved through the Eliquis assistance program  States if the paperwork is still processing she will need more Eliquis in one week while waiting on the decision  Pt goes to the [facility]  Thank you

## 2021-08-10 NOTE — TELEPHONE ENCOUNTER
Called patient assistance program [phone]  S/w Beti Erickson, she stated that they never received the paperwork and that our confirmation does not mean anything  I re-faxed the form 2 times and asked that they call the office back to confirm that they received the request     Spoke with patients daughter Gabriela Villarreal  Advised that we had to re-fax request for eliquis assistance  Also that we have samples of eliquis at the  [place] office     Verbally understands

## 2021-08-10 NOTE — TELEPHONE ENCOUNTER
Spoke with Bereket Gonsalez at [facility]  She states that the patient's folder is empty, meaning they did not receive his financial assistance application  Informed Bereket Gonsalez that I had a confirmation that it was received  She stated to refax it with [employer] on it and Patient ID #:[id number]  Faxed and confirmation received  Informed patient's daughter

## 2021-08-12 NOTE — TELEPHONE ENCOUNTER
Received form form Eliquis assistance program asking for a copy of insurance cards and an update patient signature page  Patient signed the form with the date 2/5/01  Spoke with patients daughter  She did not want to come to the office to  the form   New patient signature form was mailed to patient    Insurance cards faxed

## 2021-08-18 ENCOUNTER — OFFICE VISIT (OUTPATIENT)
Dept: SURGERY | Facility: CLINIC | Age: 86
End: 2021-08-18
Payer: MEDICARE

## 2021-08-18 ENCOUNTER — TELEPHONE (OUTPATIENT)
Dept: INTERNAL MEDICINE CLINIC | Facility: CLINIC | Age: 86
End: 2021-08-18

## 2021-08-18 VITALS
DIASTOLIC BLOOD PRESSURE: 64 MMHG | SYSTOLIC BLOOD PRESSURE: 118 MMHG | WEIGHT: 212 LBS | HEART RATE: 45 BPM | BODY MASS INDEX: 30.35 KG/M2 | TEMPERATURE: 97.8 F | HEIGHT: 70 IN

## 2021-08-18 DIAGNOSIS — R26.2 AMBULATORY DYSFUNCTION: ICD-10-CM

## 2021-08-18 DIAGNOSIS — G89.4 CHRONIC PAIN SYNDROME: ICD-10-CM

## 2021-08-18 DIAGNOSIS — L08.9 INFECTED SEBACEOUS CYST OF SKIN: Primary | ICD-10-CM

## 2021-08-18 DIAGNOSIS — L72.3 INFECTED SEBACEOUS CYST OF SKIN: Primary | ICD-10-CM

## 2021-08-18 PROCEDURE — 99213 OFFICE O/P EST LOW 20 MIN: CPT | Performed by: STUDENT IN AN ORGANIZED HEALTH CARE EDUCATION/TRAINING PROGRAM

## 2021-08-18 NOTE — PROGRESS NOTES
Assessment/Plan:  82-year-old male with infected sebaceous cyst of chest status post incision and drainage 6/1  - wound has been samia well and filling in  - no signs of infection, good granulation tissue present  - continue packing changes  - follow-up in office in 4 weeks  - refer patient to physical therapy and gave him 2 locations to go to as an outpatient due to his ambulatory dysfunction     1  Infected sebaceous cyst of skin    2  Ambulatory dysfunction  -     Ambulatory referral to Physical Therapy; Future               Subjective:      Patient ID: Abdirashid John is a 82 y o  male  Triage Notes:     Patient is an 82-year-old male who presents to office for evaluation status post incision and drainage of infected sebaceous cyst of chest   Patient denies any pain at the site  He is having continued packing changes  The following portions of the patient's history were reviewed and updated as appropriate: allergies, current medications, past family history, past medical history, past social history, past surgical history and problem list     Review of Systems   Constitutional: Negative for chills, fatigue and fever  HENT: Negative for congestion, hearing loss, rhinorrhea and sore throat  Eyes: Negative for pain and discharge  Respiratory: Negative for cough, chest tightness and shortness of breath  Cardiovascular: Negative for chest pain and palpitations  Gastrointestinal: Negative for abdominal pain, constipation, diarrhea, nausea and vomiting  Endocrine: Negative for cold intolerance and heat intolerance  Genitourinary: Negative for difficulty urinating and dysuria  Musculoskeletal: Negative for back pain and neck pain  Skin: Negative for color change and rash  Allergic/Immunologic: Negative for environmental allergies and food allergies  Neurological: Negative for seizures and headaches  Hematological: Negative for adenopathy  Does not bruise/bleed easily  Psychiatric/Behavioral: Negative for confusion and hallucinations  Objective:      /64   Pulse (!) 45   Temp 97 8 °F (36 6 °C)   Ht 5' 10" (1 778 m)   Wt 96 2 kg (212 lb)   BMI 30 42 kg/m²     Below is the patient's most recent value for Albumin, ALT, AST, BUN, Calcium, Chloride, Cholesterol, CO2, Creatinine, GFR, Glucose, HDL, Hematocrit, Hemoglobin, Hemoglobin A1C, LDL, Magnesium, Phosphorus, Platelets, Potassium, PSA, Sodium, Triglycerides, and WBC  Lab Results   Component Value Date    ALT 16 07/19/2021    AST 22 07/19/2021    BUN 34 (H) 07/20/2021    CALCIUM 8 9 07/20/2021    CL 99 (L) 07/20/2021    CHOL 190 12/01/2015    CO2 27 07/20/2021    CREATININE 1 72 (H) 07/20/2021    HDL 43 03/08/2021    HCT 38 3 07/19/2021    HGB 12 5 07/19/2021    HGBA1C 6 8 (H) 07/09/2021    MG 2 2 01/30/2020    PHOS 3 6 04/21/2021     07/19/2021    K 4 0 07/20/2021    PSA 5 8 (H) 10/29/2019     12/01/2015    TRIG 105 03/08/2021    WBC 5 78 07/19/2021     Note: for a comprehensive list of the patient's lab results, access the Results Review activity  Physical Exam  Constitutional:       Appearance: Normal appearance  HENT:      Head: Normocephalic and atraumatic  Nose: Nose normal    Eyes:      General: No scleral icterus  Conjunctiva/sclera: Conjunctivae normal    Cardiovascular:      Rate and Rhythm: Normal rate  Pulmonary:      Effort: Pulmonary effort is normal    Musculoskeletal:         General: No signs of injury  Skin:     General: Skin is warm  Coloration: Skin is not jaundiced  Comments: Wound on chest with good granulation tissue and samia well, no signs of infection   Neurological:      General: No focal deficit present  Mental Status: He is alert and oriented to person, place, and time     Psychiatric:         Mood and Affect: Mood normal          Behavior: Behavior normal

## 2021-08-20 RX ORDER — GABAPENTIN 300 MG/1
300 CAPSULE ORAL 2 TIMES DAILY
Qty: 180 CAPSULE | Refills: 3 | Status: SHIPPED | OUTPATIENT
Start: 2021-08-20 | End: 2021-11-23 | Stop reason: SDUPTHER

## 2021-08-23 NOTE — TELEPHONE ENCOUNTER
Patient signed new form with correct date     Form faxed back to assistance program  Confirmation received ~ updated form scanned into chart

## 2021-08-24 NOTE — TELEPHONE ENCOUNTER
Spoke with Kaz Denson at the [facility] address was updated  Medication and all correspondents will go to her address  Phone number was provider to her if she has any questions regarding shipments ([phone], option 6)    I also advised her the current approval is only good until December, and that he will need a renewal for next year

## 2021-08-24 NOTE — TELEPHONE ENCOUNTER
Spoke with patients daughter Feliberto Thomason  Advised that we received notification that Joselyn Claros was approved for the Eliquis assistance program    She stated that she would like the medication shipped to her house, she is afraid that he might throw it out  Her address is: [address] her that I would call this afternoon to see if address can be changed  Patient currently has enough samples, advised her with changing address it may delay the first shipment  If samples are needs advised to call the office

## 2021-09-03 ENCOUNTER — APPOINTMENT (EMERGENCY)
Dept: CT IMAGING | Facility: HOSPITAL | Age: 86
End: 2021-09-03
Payer: MEDICARE

## 2021-09-03 ENCOUNTER — APPOINTMENT (EMERGENCY)
Dept: RADIOLOGY | Facility: HOSPITAL | Age: 86
End: 2021-09-03
Payer: MEDICARE

## 2021-09-03 ENCOUNTER — HOSPITAL ENCOUNTER (EMERGENCY)
Facility: HOSPITAL | Age: 86
Discharge: HOME | End: 2021-09-03
Attending: EMERGENCY MEDICINE | Admitting: EMERGENCY MEDICINE
Payer: MEDICARE

## 2021-09-03 VITALS
RESPIRATION RATE: 18 BRPM | TEMPERATURE: 98.3 F | OXYGEN SATURATION: 98 % | SYSTOLIC BLOOD PRESSURE: 211 MMHG | DIASTOLIC BLOOD PRESSURE: 93 MMHG | HEART RATE: 56 BPM
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
## 2021-09-03 DIAGNOSIS — M25.551 RIGHT HIP PAIN: ICD-10-CM

## 2021-09-03 PROCEDURE — 70450 CT HEAD/BRAIN W/O DYE: CPT

## 2021-09-03 PROCEDURE — 73502 X-RAY EXAM HIP UNI 2-3 VIEWS: CPT

## 2021-09-03 PROCEDURE — 99284 EMERGENCY DEPT VISIT MOD MDM: CPT | Performed by: EMERGENCY MEDICINE

## 2021-09-03 PROCEDURE — 99284 EMERGENCY DEPT VISIT MOD MDM: CPT

## 2021-09-03 NOTE — ED PROVIDER NOTES
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 were between 10 and 20 years ago, he is unsure as to exactly when it was  Patient has normal pulse motor and sensory in the affected extremity  History provided by:  Patient   used: No    Fall  Mechanism of injury: fall    Arrived directly from scene: no    Fall:     Entrapped after fall: no    Prior to arrival data:     Bystander interventions:  None    Airway interventions:  None  Associated symptoms: no abdominal pain, no back pain, no hearing loss, no nausea and no neck pain    Risk factors: no asthma, no hemophilia and no past MI        Prior to Admission Medications   Prescriptions Last Dose Informant Patient Reported? Taking?    Blood Glucose Monitoring Suppl (ONE TOUCH ULTRA MINI) w/Device KIT  Child No No   Sig: Use daily   Omega-3 Fatty Acids (FISH OIL CONCENTRATE PO)  Child Yes No   Sig: Take 1 tablet by mouth daily   amLODIPine (NORVASC) 10 mg tablet  Child No No   Sig: TAKE 1 TABLET BY MOUTH  DAILY   Patient taking differently: 10 mg    apixaban (ELIQUIS) 2 5 mg  Child No No   Sig: Take 1 tablet (2 5 mg total) by mouth 2 (two) times a day   cholecalciferol (VITAMIN D3) 1,000 units tablet  Child Yes No   Sig: Take 1,000 Units by mouth daily   clopidogrel (PLAVIX) 75 mg tablet  Child No No   Sig: Take 1 tablet (75 mg total) by mouth daily   gabapentin (NEURONTIN) 300 mg capsule   No No   Sig: Take 1 capsule (300 mg total) by mouth 2 (two) times a day   glimepiride (AMARYL) 1 mg tablet  Child No No   Sig: Take 1 tablet (1 mg total) by mouth daily with breakfast   glucose blood (ONE TOUCH ULTRA TEST) test strip  Child No No   Sig: Test once daily   isosorbide dinitrate (ISORDIL) 10 mg tablet  Child No No   Sig: Take 1 tablet (10 mg total) by mouth 2 (two) times a day   lisinopril (ZESTRIL) 20 mg tablet  Child No No   Sig: Take 1 tablet (20 mg total) by mouth daily   metoprolol tartrate (LOPRESSOR) 25 mg tablet  Child No No   Sig: Take 1 tablet (25 mg total) by mouth every 12 (twelve) hours   Patient taking differently: Take 25 mg by mouth every 12 (twelve) hours Patient 25 mg twice a day 07/28/21   triamcinolone (KENALOG) 0 1 % cream   No No   Sig: Apply topically 2 (two) times a day   vitamin B-12 (CYANOCOBALAMIN) 100 MCG tablet  Child Yes No   Sig: Take 1,000 mcg by mouth daily      Facility-Administered Medications: None       Past Medical History:   Diagnosis Date    Acute deep vein thrombosis (DVT) of brachial vein of left upper extremity (HCC) 11/24/2017    Acute deep vein thrombosis (DVT) of left peroneal vein (HCC)     Acute ST elevation myocardial infarction ([address] )     Aortic valve stenosis     Atrial fibrillation ([address] )     Basilar artery stenosis     Basilar artery stenosis     Benign prostatic hyperplasia with lower urinary tract symptoms     CAD (coronary artery disease)     Chronic kidney disease     Closed fracture of multiple ribs of left side with routine healing 9/3/2020    DM2 (diabetes mellitus, type 2) ([address] )     History of transfusion     HLD (hyperlipidemia)     HTN (hypertension)     Middle cerebral artery stenosis     Proteinuria     Symptomatic bradycardia     Transient cerebral ischemia     Vitamin D deficiency        Past Surgical History:   Procedure Laterality Date    CARDIAC CATHETERIZATION      Outcome: successful; last assessed: 02/03/2015   Haily Casanova CARDIAC CATHETERIZATION  11/26/2019    CORONARY ANGIOPLASTY WITH STENT PLACEMENT  2008    stent to LAD     HAND SURGERY      thumb    HIP HARDWARE REMOVAL      TOTAL HIP ARTHROPLASTY Right     TOTAL HIP ARTHROPLASTY Bilateral        Family History   Problem Relation Age of Onset    Emphysema Father     Emphysema Sister      I have reviewed and agree with the history as documented  E-Cigarette/Vaping    E-Cigarette Use Never User      E-Cigarette/Vaping Substances    Nicotine No     THC No     CBD No     Flavoring No     Other No     Unknown No      Social History     Tobacco Use    Smoking status: Never Smoker    Smokeless tobacco: Never Used   Vaping Use    Vaping Use: Never used   Substance Use Topics    Alcohol use: Never    Drug use: No       Review of Systems   HENT: Negative for hearing loss  Gastrointestinal: Negative for abdominal pain and nausea  Musculoskeletal: Negative for back pain and neck pain  All other systems reviewed and are negative  Physical Exam  Physical Exam  Vitals and nursing note reviewed  Constitutional:       General: He is not in acute distress  Appearance: He is well-developed  He is not diaphoretic  HENT:      Head: Normocephalic and atraumatic  Right Ear: External ear normal       Left Ear: External ear normal    Eyes:      General: No scleral icterus  Right eye: No discharge  Left eye: No discharge  Conjunctiva/sclera: Conjunctivae normal    Neck:      Thyroid: No thyromegaly  Vascular: No JVD  Trachea: No tracheal deviation  Cardiovascular:      Rate and Rhythm: Normal rate and regular rhythm  Pulmonary:      Effort: Pulmonary effort is normal  No respiratory distress  Breath sounds: Normal breath sounds  No stridor  No wheezing or rales  Abdominal:      General: Bowel sounds are normal  There is no distension  Palpations: Abdomen is soft  Tenderness: There is no abdominal tenderness  Musculoskeletal:         General: No tenderness or deformity  Normal range of motion  Cervical back: Normal range of motion and neck supple  Skin:     General: Skin is warm and dry  Neurological:      Mental Status: He is alert and oriented to person, place, and time  Cranial Nerves: No cranial nerve deficit  Coordination: Coordination normal    Psychiatric:         Behavior: Behavior normal          Vital Signs  ED Triage Vitals [09/03/21 1542]   Temperature Pulse Respirations Blood Pressure SpO2   98 3 °F (36 8 °C) 56 18 (!) 211/93 98 %      Temp Source Heart Rate Source Patient Position - Orthostatic VS BP Location FiO2 (%)   Oral Monitor Sitting Right arm --      Pain Score       9           Vitals:    09/03/21 1542   BP: (!) 211/93   Pulse: 56   Patient Position - Orthostatic VS: Sitting         Visual Acuity      ED Medications  Medications - No data to display    Diagnostic Studies  Results Reviewed     None                 XR hip/pelv 2-3 vws right if performed   Final Result by Len Huang MD (09/03 1727)      Bilateral hip arthroplasties without hardware complication            Workstation performed: [id number]         CT head without contrast   Final Result by Africa Hines MD (09/03 1659)      No acute intracranial abnormality  Workstation performed: [id number]                    Procedures  Procedures         ED Course                             SBIRT 20yo+      Most Recent Value   SBIRT (22 yo +)   In order to provide better care to our patients, we are screening all of our patients for alcohol and drug use  Would it be okay to ask you these screening questions? Yes Filed at: 09/03/2021 1605   Initial Alcohol Screen: US AUDIT-C    1  How often do you have a drink containing alcohol?  0 Filed at: 09/03/2021 1605   2  How many drinks containing alcohol do you have on a typical day you are drinking? 0 Filed at: 09/03/2021 1605   3a  Male UNDER 65:  How often do you have five or more drinks on one occasion? 0 Filed at: 09/03/2021 1605   Audit-C Score  0 Filed at: 09/03/2021 1605   MAVERICK: How many times in the past year have you    Used an illegal drug or used a prescription medication for non-medical reasons? Never Filed at: 09/03/2021 1605                    [facility]  Number of Diagnoses or Management Options  Fall, initial encounter: new and requires workup  Right hip pain: new and requires workup     Amount and/or Complexity of Data Reviewed  Clinical lab tests: ordered and reviewed  Tests in the radiology section of CPT®: reviewed and ordered  Decide to obtain previous medical records or to obtain history from someone other than the patient: yes  Review and summarize past medical records: yes    Patient Progress  Patient progress: stable      Disposition  Final diagnoses:   Fall, initial encounter   Right hip pain     Time reflects when diagnosis was documented in both MDM as applicable and the Disposition within this note     Time User Action Codes Description Comment    9/3/2021  5:52 PM Zoraida Rousseau [E95  FPVM] Fall, initial encounter     9/3/2021  5:52 PM Zoraida Rousseau [Y72 490] Right hip pain       ED Disposition     ED Disposition Condition Date/Time Comment    Discharge Stable Fri Sep 3, 2021  5:52 PM Blanquita Pineda discharge to home/self care              Follow-up Information     Follow up With Specialties Details Why [address],  Internal Medicine   [address] [address]  [phone]            Discharge Medication List as of 9/3/2021  5:52 PM      CONTINUE these medications which have NOT CHANGED    Details   amLODIPine (NORVASC) 10 mg tablet TAKE 1 TABLET BY MOUTH  DAILY, Normal      apixaban (ELIQUIS) 2 5 mg Take 1 tablet (2 5 mg total) by mouth 2 (two) times a day, Starting Fri 7/16/2021, Print      Blood Glucose Monitoring Suppl (ONE TOUCH ULTRA MINI) w/Device KIT Use daily, Starting Tue 6/8/2021, Normal      cholecalciferol (VITAMIN D3) 1,000 units tablet Take 1,000 Units by mouth daily, Historical Med      clopidogrel (PLAVIX) 75 mg tablet Take 1 tablet (75 mg total) by mouth daily, Starting Thu 9/3/2020, Normal      gabapentin (NEURONTIN) 300 mg capsule Take 1 capsule (300 mg total) by mouth 2 (two) times a day, Starting Fri 8/20/2021, Normal      glimepiride (AMARYL) 1 mg tablet Take 1 tablet (1 mg total) by mouth daily with breakfast, Starting Fri 7/16/2021, Normal      glucose blood (ONE TOUCH ULTRA TEST) test strip Test once daily, Normal      isosorbide dinitrate (ISORDIL) 10 mg tablet Take 1 tablet (10 mg total) by mouth 2 (two) times a day, Starting Tue 5/4/2021, Until Wed 7/28/2021, No Print      lisinopril (ZESTRIL) 20 mg tablet Take 1 tablet (20 mg total) by mouth daily, Starting Thu 7/22/2021, Until Wed 10/20/2021, Normal      metoprolol tartrate (LOPRESSOR) 25 mg tablet Take 1 tablet (25 mg total) by mouth every 12 (twelve) hours, Starting Fri 3/19/2021, Normal      Omega-3 Fatty Acids (FISH OIL CONCENTRATE PO) Take 1 tablet by mouth daily, Historical Med      triamcinolone (KENALOG) 0 1 % cream Apply topically 2 (two) times a day, Starting Mon 8/9/2021, Normal      vitamin B-12 (CYANOCOBALAMIN) 100 MCG tablet Take 1,000 mcg by mouth daily, Historical Med           No discharge procedures on file      PDMP Review       Value Time User    PDMP Reviewed  Yes 8/22/2020  5:29 PM Jessica [facility] Provider  Electronically Signed by           Mariaelena Suarez DO  09/04/21 3713

## 2021-09-03 NOTE — TELEPHONE ENCOUNTER
Pt's dtr called & said that she had req that pt's Eliquis be mailed to her home & it got mailed to pt's home & he almost threw the med out  Pt's dtr wants to make sure that from here on out the med get mailed to her home at: [address]

## 2021-09-08 NOTE — TELEPHONE ENCOUNTER
S/jhoan Casanova from [facility], address was updated  Jocelyne stated that next shipment will be sent to address   Ref#[id number]

## 2021-09-09 ENCOUNTER — OFFICE VISIT (OUTPATIENT)
Dept: INTERNAL MEDICINE CLINIC | Facility: CLINIC | Age: 86
End: 2021-09-09
Payer: MEDICARE

## 2021-09-09 VITALS
HEIGHT: 70 IN | WEIGHT: 212.8 LBS | OXYGEN SATURATION: 95 % | HEART RATE: 52 BPM | DIASTOLIC BLOOD PRESSURE: 62 MMHG | SYSTOLIC BLOOD PRESSURE: 122 MMHG | BODY MASS INDEX: 30.46 KG/M2 | TEMPERATURE: 97.4 F

## 2021-09-09 DIAGNOSIS — I10 ESSENTIAL HYPERTENSION: Chronic | ICD-10-CM
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
## 2021-09-09 DIAGNOSIS — N18.32 STAGE 3B CHRONIC KIDNEY DISEASE (HCC): Chronic | ICD-10-CM

## 2021-09-09 DIAGNOSIS — Z86.73 HISTORY OF CVA (CEREBROVASCULAR ACCIDENT): ICD-10-CM

## 2021-09-09 PROCEDURE — 99214 OFFICE O/P EST MOD 30 MIN: CPT | Performed by: INTERNAL MEDICINE

## 2021-09-09 RX ORDER — CLOPIDOGREL BISULFATE 75 MG/1
75 TABLET ORAL DAILY
Qty: 90 TABLET | Refills: 3 | Status: SHIPPED | OUTPATIENT
Start: 2021-09-09 | End: 2021-11-30 | Stop reason: ALTCHOICE

## 2021-09-09 RX ORDER — AMLODIPINE BESYLATE 10 MG/1
10 TABLET ORAL DAILY
Qty: 90 TABLET | Refills: 3 | Status: SHIPPED | OUTPATIENT
Start: 2021-09-09 | End: 2022-07-12 | Stop reason: SDUPTHER

## 2021-09-09 NOTE — PATIENT INSTRUCTIONS
Fall Prevention   WHAT YOU NEED TO KNOW:   Fall prevention includes ways to make your home and other areas safer  It also includes ways you can move more carefully to prevent a fall  Health conditions that cause changes in your blood pressure, vision, or muscle strength and coordination may increase your risk for falls  Medicines may also increase your risk for falls if they make you dizzy, weak, or sleepy  DISCHARGE INSTRUCTIONS:   Call 911 or have someone else call if:   · You have fallen and are unconscious  · You have fallen and cannot move part of your body  Contact your healthcare provider if:   · You have fallen and have pain or a headache  · You have questions or concerns about your condition or care  Fall prevention tips:   · Stand or sit up slowly  This may help you keep your balance and prevent falls  · Use assistive devices as directed  Your healthcare provider may suggest that you use a cane or walker to help you keep your balance  You may need to have grab bars put in your bathroom near the toilet or in the shower  · Wear shoes that fit well and have soles that   Wear shoes both inside and outside  Use slippers with good   Do not wear shoes with high heels  · Wear a personal alarm  This is a device that allows you to call 911 if you fall and need help  Ask your healthcare provider for more information  · Stay active  Exercise can help strengthen your muscles and improve your balance  Your healthcare provider may recommend water aerobics or walking  He or she may also recommend physical therapy to improve your coordination  Never start an exercise program without talking to your healthcare provider first          · Manage your medical conditions  Keep all appointments with your healthcare providers  Visit your eye doctor as directed  Home safety tips:       · Add items to prevent falls in the bathroom    Put nonslip strips on your bath or shower floor to prevent you from slipping  Use a bath mat if you do not have carpet in the bathroom  This will prevent you from falling when you step out of the bath or shower  Use a shower seat so you do not need to stand while you shower  Sit on the toilet or a chair in your bathroom to dry yourself and put on clothing  This will prevent you from losing your balance from drying or dressing yourself while you are standing  · Keep paths clear  Remove books, shoes, and other objects from walkways and stairs  Place cords for telephones and lamps out of the way so that you do not need to walk over them  Tape them down if you cannot move them  Remove small rugs  If you cannot remove a rug, secure it with double-sided tape  This will prevent you from tripping  · Install bright lights in your home  Use night lights to help light paths to the bathroom or kitchen  Always turn on the light before you start walking  · Keep items you use often on shelves within reach  Do not use a step stool to help you reach an item  · Paint or place reflective tape on the edges of your stairs  This will help you see the stairs better  Follow up with your healthcare provider as directed:  Write down your questions so you remember to ask them during your visits  © Copyright [employer] 2021 Information is for End User's use only and may not be sold, redistributed or otherwise used for commercial purposes  All illustrations and images included in CareNotes® are the copyrighted property of A D A M , Inc  or Regan Morse  The above information is an  only  It is not intended as medical advice for individual conditions or treatments  Talk to your doctor, nurse or pharmacist before following any medical regimen to see if it is safe and effective for you

## 2021-09-09 NOTE — PROGRESS NOTES
[place]    NAME: Tonya Terry  AGE: 82 y o  SEX: male  : 1935     DATE: 2021     Assessment and Plan:     1  Fall, subsequent encounter    Fall precautions reviewed  He would be open to PT in the future but has visiting nurse coming in right now and doing wound care from recent cyst surgery  2  Stage 3b chronic kidney disease ([address] )    Monitor renal function closely  Needs to drink more water  No NSAIDs  Follows with nephrology  3  Essential hypertension    Bp stable  Monitor     - amLODIPine (NORVASC) 10 mg tablet; Take 1 tablet (10 mg total) by mouth daily  Dispense: 90 tablet; Refill: 3    4  History of CVA (cerebrovascular accident)  - clopidogrel (PLAVIX) 75 mg tablet; Take 1 tablet (75 mg total) by mouth daily  Dispense: 90 tablet; Refill: 3        Return in about 2 months (around 2021) for Subsequent AWV  Chief Complaint:     Chief Complaint   Patient presents with    Follow-up     5 week      History of Present Illness:     Dhruv Briscoe was in ER the other day due to fall  He was walking dog and there was uneven gravel  Jimmey Claw on right hip and has had prior hip replacement  No evidence of hip fracture or hardware problems on x-ray  Having pain in the hip area  Chronic gait disturbance and uses cane  He tries to be active during the day when he takes dog for a walk  Has been using ice and taking tylenol  Pain is up and down  Review of Systems:     Review of Systems   Constitutional: Positive for fatigue  Negative for chills and fever  Respiratory: Negative  Cardiovascular: Negative  Gastrointestinal: Negative  Musculoskeletal: Positive for arthralgias and gait problem  Neurological: Positive for weakness        Objective:     /62   Pulse (!) 52   Temp (!) 97 4 °F (36 3 °C) (Tympanic)   Ht 5' 10" (1 778 m)   Wt 96 5 kg (212 lb 12 8 oz)   SpO2 95%   BMI 30 53 kg/m²     Physical Exam  Constitutional: General: He is not in acute distress  Appearance: He is not ill-appearing  Cardiovascular:      Rate and Rhythm: Normal rate and regular rhythm  Heart sounds: No murmur heard  Pulmonary:      Effort: Pulmonary effort is normal  No respiratory distress  Breath sounds: No wheezing  Abdominal:      General: Bowel sounds are normal  There is no distension  Tenderness: There is no abdominal tenderness  Musculoskeletal:         General: Tenderness (hip) present  Right lower leg: No edema  Left lower leg: No edema  Neurological:      Mental Status: He is alert        Gait: Gait abnormal        Lary Opitz, DO  MEDICAL [id number]

## 2021-09-21 ENCOUNTER — OFFICE VISIT (OUTPATIENT)
Dept: SURGERY | Facility: CLINIC | Age: 86
End: 2021-09-21
Payer: MEDICARE

## 2021-09-21 VITALS
HEART RATE: 48 BPM | SYSTOLIC BLOOD PRESSURE: 148 MMHG | DIASTOLIC BLOOD PRESSURE: 54 MMHG | HEIGHT: 70 IN | WEIGHT: 215 LBS | BODY MASS INDEX: 30.78 KG/M2 | RESPIRATION RATE: 16 BRPM | TEMPERATURE: 96.1 F

## 2021-09-21 DIAGNOSIS — L72.3 INFECTED SEBACEOUS CYST OF SKIN: Primary | ICD-10-CM

## 2021-09-21 DIAGNOSIS — L08.9 INFECTED SEBACEOUS CYST OF SKIN: Primary | ICD-10-CM

## 2021-09-21 PROCEDURE — 99213 OFFICE O/P EST LOW 20 MIN: CPT | Performed by: STUDENT IN AN ORGANIZED HEALTH CARE EDUCATION/TRAINING PROGRAM

## 2021-09-21 NOTE — PROGRESS NOTES
Assessment/Plan:  77-year-old male with infected sebaceous cyst of chest status post incision and drainage 6/1  -  Patient continues to heal well, wound continues to contract  - part of the inferior portion of wound has epithelialized, due to this the patient will most likely have a hole in the area after healing  - the only way to get rid of this would be to surgically excise it,  Patient states he does not mind having a scar/hole in the area  - there is some granulation tissue at the base of wound cavity which was cauterized with silver nitrate to help in scarring since the surrounding area had already epithelialized  - continue daily  Wound packing at this time  - follow-up in office in 3 weeks       1  Infected sebaceous cyst of skin               Subjective:      Patient ID: Herber Alexis is a 82 y o  male  Triage Notes:     Patient is an 77-year-old male who presents to office for evaluation status post incision and drainage of infected sebaceous cyst   Patient was recently seen in the ED due to a fall  He has been getting packing changes done every other day  He denies any pain in the area denies any complaints  The following portions of the patient's history were reviewed and updated as appropriate: allergies, current medications, past family history, past medical history, past social history, past surgical history and problem list     Review of Systems   Constitutional: Negative for chills, fatigue and fever  HENT: Negative for congestion, hearing loss, rhinorrhea and sore throat  Eyes: Negative for pain and discharge  Respiratory: Negative for cough, chest tightness and shortness of breath  Cardiovascular: Negative for chest pain and palpitations  Gastrointestinal: Negative for abdominal pain, constipation, diarrhea, nausea and vomiting  Endocrine: Negative for cold intolerance and heat intolerance  Genitourinary: Negative for difficulty urinating and dysuria     Musculoskeletal: Negative for back pain and neck pain  Skin: Negative for color change and rash  Allergic/Immunologic: Negative for environmental allergies and food allergies  Neurological: Negative for seizures and headaches  Hematological: Negative for adenopathy  Does not bruise/bleed easily  Psychiatric/Behavioral: Negative for confusion and hallucinations  Objective:      /54   Pulse (!) 48   Temp (!) 96 1 °F (35 6 °C) (Temporal)   Resp 16   Ht 5' 10" (1 778 m)   Wt 97 5 kg (215 lb)   BMI 30 85 kg/m²     Below is the patient's most recent value for Albumin, ALT, AST, BUN, Calcium, Chloride, Cholesterol, CO2, Creatinine, GFR, Glucose, HDL, Hematocrit, Hemoglobin, Hemoglobin A1C, LDL, Magnesium, Phosphorus, Platelets, Potassium, PSA, Sodium, Triglycerides, and WBC  Lab Results   Component Value Date    ALT 16 07/19/2021    AST 22 07/19/2021    BUN 34 (H) 07/20/2021    CALCIUM 8 9 07/20/2021    CL 99 (L) 07/20/2021    CHOL 190 12/01/2015    CO2 27 07/20/2021    CREATININE 1 72 (H) 07/20/2021    HDL 43 03/08/2021    HCT 38 3 07/19/2021    HGB 12 5 07/19/2021    HGBA1C 6 8 (H) 07/09/2021    MG 2 2 01/30/2020    PHOS 3 6 04/21/2021     07/19/2021    K 4 0 07/20/2021    PSA 5 8 (H) 10/29/2019     12/01/2015    TRIG 105 03/08/2021    WBC 5 78 07/19/2021     Note: for a comprehensive list of the patient's lab results, access the Results Review activity  Physical Exam  Constitutional:       Appearance: Normal appearance  HENT:      Head: Normocephalic and atraumatic  Nose: Nose normal    Eyes:      General: No scleral icterus  Conjunctiva/sclera: Conjunctivae normal    Cardiovascular:      Rate and Rhythm: Normal rate  Pulmonary:      Effort: Pulmonary effort is normal    Musculoskeletal:         General: No signs of injury  Skin:     General: Skin is warm  Coloration: Skin is not jaundiced        Comments: Wound on chest wall without erythema induration or drainage, the inferior portion of the cavity has epithelialized with a small amount of granulation tissue superiorly   Neurological:      General: No focal deficit present  Mental Status: He is alert and oriented to person, place, and time     Psychiatric:         Mood and Affect: Mood normal          Behavior: Behavior normal

## 2021-09-22 DIAGNOSIS — I25.10 CORONARY ARTERY DISEASE INVOLVING NATIVE CORONARY ARTERY OF NATIVE HEART WITHOUT ANGINA PECTORIS: ICD-10-CM

## 2021-09-22 RX ORDER — ISOSORBIDE DINITRATE 10 MG/1
10 TABLET ORAL 2 TIMES DAILY
Qty: 180 TABLET | Refills: 3 | Status: SHIPPED | OUTPATIENT
Start: 2021-09-22 | End: 2022-09-17

## 2021-09-22 NOTE — TELEPHONE ENCOUNTER
Name of Caller: Tala Bass (daughter)     Call back Number: [phone]    Medication(s):   isosorbide dinitrate (ISORDIL) 10 mg tablet ()          Are we prescribing provider?: yes     90 day supply:    Pharmacy name/number: verified mail order

## 2021-09-30 DIAGNOSIS — E11.8 TYPE 2 DIABETES MELLITUS WITH COMPLICATION, WITHOUT LONG-TERM CURRENT USE OF INSULIN (HCC): ICD-10-CM

## 2021-09-30 RX ORDER — BLOOD SUGAR DIAGNOSTIC
STRIP MISCELLANEOUS
Qty: 100 EACH | Refills: 3 | Status: SHIPPED | OUTPATIENT
Start: 2021-09-30

## 2021-10-14 ENCOUNTER — APPOINTMENT (EMERGENCY)
Dept: RADIOLOGY | Facility: HOSPITAL | Age: 86
End: 2021-10-14
Payer: MEDICARE

## 2021-10-14 ENCOUNTER — APPOINTMENT (EMERGENCY)
Dept: CT IMAGING | Facility: HOSPITAL | Age: 86
End: 2021-10-14
Payer: MEDICARE

## 2021-10-14 ENCOUNTER — HOSPITAL ENCOUNTER (OUTPATIENT)
Facility: HOSPITAL | Age: 86
Setting detail: OBSERVATION
Discharge: HOME HEALTH | End: 2021-10-15
Attending: EMERGENCY MEDICINE | Admitting: INTERNAL MEDICINE
Payer: MEDICARE
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
## 2021-10-14 DIAGNOSIS — S22.41XA CLOSED FRACTURE OF MULTIPLE RIBS OF RIGHT SIDE, INITIAL ENCOUNTER: ICD-10-CM

## 2021-10-14 DIAGNOSIS — E11.22 TYPE 2 DIABETES MELLITUS WITH STAGE 3B CHRONIC KIDNEY DISEASE, WITHOUT LONG-TERM CURRENT USE OF INSULIN (HCC): ICD-10-CM

## 2021-10-14 DIAGNOSIS — R00.1 BRADYCARDIA: ICD-10-CM

## 2021-10-14 DIAGNOSIS — Z86.73 HISTORY OF CVA (CEREBROVASCULAR ACCIDENT): ICD-10-CM

## 2021-10-14 DIAGNOSIS — S22.41XA MULTIPLE FRACTURES OF RIBS, RIGHT SIDE, INITIAL ENCOUNTER FOR CLOSED FRACTURE: Primary | ICD-10-CM

## 2021-10-14 DIAGNOSIS — R68.89 FORGETFULNESS: ICD-10-CM

## 2021-10-14 DIAGNOSIS — N18.32 TYPE 2 DIABETES MELLITUS WITH STAGE 3B CHRONIC KIDNEY DISEASE, WITHOUT LONG-TERM CURRENT USE OF INSULIN (HCC): ICD-10-CM

## 2021-10-14 PROBLEM — Z86.718 HISTORY OF DVT OF LOWER EXTREMITY: Chronic | Status: RESOLVED | Noted: 2019-12-16 | Resolved: 2021-10-14

## 2021-10-14 PROBLEM — I35.0 NONRHEUMATIC AORTIC VALVE STENOSIS: Chronic | Status: RESOLVED | Noted: 2018-10-09 | Resolved: 2021-10-14

## 2021-10-14 PROBLEM — I67.9 CEREBROVASCULAR SMALL VESSEL DISEASE: Chronic | Status: RESOLVED | Noted: 2020-11-12 | Resolved: 2021-10-14

## 2021-10-14 PROBLEM — R77.8 ELEVATED TROPONIN: Status: RESOLVED | Noted: 2021-07-19 | Resolved: 2021-10-14

## 2021-10-14 PROBLEM — R55 NEAR SYNCOPE: Status: RESOLVED | Noted: 2021-07-19 | Resolved: 2021-10-14

## 2021-10-14 PROBLEM — L08.9 INFECTED SEBACEOUS CYST OF SKIN: Status: RESOLVED | Noted: 2021-06-08 | Resolved: 2021-10-14

## 2021-10-14 PROBLEM — I25.10 CAD IN NATIVE ARTERY: Chronic | Status: RESOLVED | Noted: 2017-11-24 | Resolved: 2021-10-14

## 2021-10-14 PROBLEM — E78.5 HLD (HYPERLIPIDEMIA): Chronic | Status: RESOLVED | Noted: 2017-11-24 | Resolved: 2021-10-14

## 2021-10-14 PROBLEM — L72.3 INFECTED SEBACEOUS CYST OF SKIN: Status: RESOLVED | Noted: 2021-06-08 | Resolved: 2021-10-14

## 2021-10-14 PROBLEM — R79.89 ELEVATED TROPONIN: Status: RESOLVED | Noted: 2021-07-19 | Resolved: 2021-10-14

## 2021-10-14 PROBLEM — I65.1 BASILAR ARTERY STENOSIS: Chronic | Status: RESOLVED | Noted: 2020-05-04 | Resolved: 2021-10-14

## 2021-10-14 PROBLEM — E55.9 VITAMIN D DEFICIENCY: Chronic | Status: RESOLVED | Noted: 2020-02-05 | Resolved: 2021-10-14

## 2021-10-14 PROBLEM — R22.2 LUMP IN CHEST: Status: RESOLVED | Noted: 2021-06-01 | Resolved: 2021-10-14

## 2021-10-14 PROBLEM — S22.49XA RIB FRACTURES: Status: ACTIVE | Noted: 2020-07-31

## 2021-10-14 PROBLEM — B02.9 HERPES ZOSTER WITHOUT COMPLICATION: Status: RESOLVED | Noted: 2021-07-28 | Resolved: 2021-10-14

## 2021-10-14 PROBLEM — R41.0 INTERMITTENT CONFUSION: Status: ACTIVE | Noted: 2021-10-14

## 2021-10-14 PROBLEM — R80.8 OTHER PROTEINURIA: Chronic | Status: RESOLVED | Noted: 2019-10-08 | Resolved: 2021-10-14

## 2021-10-14 LAB
ALBUMIN SERPL BCP-MCNC: 3.2 G/DL (ref 3.5–5)
ALP SERPL-CCNC: 63 U/L (ref 46–116)
ALT SERPL W P-5'-P-CCNC: 21 U/L (ref 12–78)
ANION GAP SERPL CALCULATED.3IONS-SCNC: 7 MMOL/L (ref 4–13)
APTT PPP: 34 SECONDS (ref 23–37)
AST SERPL W P-5'-P-CCNC: 17 U/L (ref 5–45)
BASOPHILS # BLD AUTO: 0.05 THOUSANDS/ΜL (ref 0–0.1)
BASOPHILS NFR BLD AUTO: 1 % (ref 0–1)
BILIRUB SERPL-MCNC: 0.36 MG/DL (ref 0.2–1)
BUN SERPL-MCNC: 36 MG/DL (ref 5–25)
CALCIUM ALBUM COR SERPL-MCNC: 9.5 MG/DL (ref 8.3–10.1)
CALCIUM SERPL-MCNC: 8.9 MG/DL (ref 8.3–10.1)
CHLORIDE SERPL-SCNC: 105 MMOL/L (ref 100–108)
CO2 SERPL-SCNC: 28 MMOL/L (ref 21–32)
CREAT SERPL-MCNC: 1.77 MG/DL (ref 0.6–1.3)
EOSINOPHIL # BLD AUTO: 0.38 THOUSAND/ΜL (ref 0–0.61)
EOSINOPHIL NFR BLD AUTO: 5 % (ref 0–6)
ERYTHROCYTE [DISTWIDTH] IN BLOOD BY AUTOMATED COUNT: 13.2 % (ref 11.6–15.1)
GFR SERPL CREATININE-BSD FRML MDRD: 35 ML/MIN/1.73SQ M
GLUCOSE SERPL-MCNC: 121 MG/DL (ref 65–140)
HCT VFR BLD AUTO: 39.7 % (ref 36.5–49.3)
HGB BLD-MCNC: 13 G/DL (ref 12–17)
IMM GRANULOCYTES # BLD AUTO: 0.01 THOUSAND/UL (ref 0–0.2)
IMM GRANULOCYTES NFR BLD AUTO: 0 % (ref 0–2)
INR PPP: 1.05 (ref 0.84–1.19)
LYMPHOCYTES # BLD AUTO: 1.52 THOUSANDS/ΜL (ref 0.6–4.47)
LYMPHOCYTES NFR BLD AUTO: 21 % (ref 14–44)
MCH RBC QN AUTO: 31.2 PG (ref 26.8–34.3)
MCHC RBC AUTO-ENTMCNC: 32.7 G/DL (ref 31.4–37.4)
MCV RBC AUTO: 95 FL (ref 82–98)
MONOCYTES # BLD AUTO: 0.48 THOUSAND/ΜL (ref 0.17–1.22)
MONOCYTES NFR BLD AUTO: 7 % (ref 4–12)
NEUTROPHILS # BLD AUTO: 4.74 THOUSANDS/ΜL (ref 1.85–7.62)
NEUTS SEG NFR BLD AUTO: 66 % (ref 43–75)
NRBC BLD AUTO-RTO: 0 /100 WBCS
PLATELET # BLD AUTO: 202 THOUSANDS/UL (ref 149–390)
PMV BLD AUTO: 9.7 FL (ref 8.9–12.7)
POTASSIUM SERPL-SCNC: 4.4 MMOL/L (ref 3.5–5.3)
PROT SERPL-MCNC: 7.1 G/DL (ref 6.4–8.2)
PROTHROMBIN TIME: 13.3 SECONDS (ref 11.6–14.5)
RBC # BLD AUTO: 4.17 MILLION/UL (ref 3.88–5.62)
SODIUM SERPL-SCNC: 140 MMOL/L (ref 136–145)
TROPONIN I SERPL-MCNC: 0.02 NG/ML
TSH SERPL DL<=0.05 MIU/L-ACNC: 1.75 UIU/ML (ref 0.36–3.74)
VIT B12 SERPL-MCNC: 1418 PG/ML (ref 100–900)
WBC # BLD AUTO: 7.18 THOUSAND/UL (ref 4.31–10.16)

## 2021-10-14 PROCEDURE — 71260 CT THORAX DX C+: CPT

## 2021-10-14 PROCEDURE — 85610 PROTHROMBIN TIME: CPT | Performed by: EMERGENCY MEDICINE

## 2021-10-14 PROCEDURE — 99285 EMERGENCY DEPT VISIT HI MDM: CPT | Performed by: EMERGENCY MEDICINE

## 2021-10-14 PROCEDURE — 74177 CT ABD & PELVIS W/CONTRAST: CPT

## 2021-10-14 PROCEDURE — 85730 THROMBOPLASTIN TIME PARTIAL: CPT | Performed by: EMERGENCY MEDICINE

## 2021-10-14 PROCEDURE — 36415 COLL VENOUS BLD VENIPUNCTURE: CPT | Performed by: EMERGENCY MEDICINE

## 2021-10-14 PROCEDURE — 71045 X-RAY EXAM CHEST 1 VIEW: CPT

## 2021-10-14 PROCEDURE — 72125 CT NECK SPINE W/O DYE: CPT

## 2021-10-14 PROCEDURE — 82607 VITAMIN B-12: CPT | Performed by: PHYSICIAN ASSISTANT

## 2021-10-14 PROCEDURE — 93005 ELECTROCARDIOGRAM TRACING: CPT

## 2021-10-14 PROCEDURE — 99220 PR INITIAL OBSERVATION CARE/DAY 70 MINUTES: CPT | Performed by: INTERNAL MEDICINE

## 2021-10-14 PROCEDURE — 99285 EMERGENCY DEPT VISIT HI MDM: CPT

## 2021-10-14 PROCEDURE — 84443 ASSAY THYROID STIM HORMONE: CPT | Performed by: PHYSICIAN ASSISTANT

## 2021-10-14 PROCEDURE — 80053 COMPREHEN METABOLIC PANEL: CPT | Performed by: EMERGENCY MEDICINE

## 2021-10-14 PROCEDURE — G1004 CDSM NDSC: HCPCS

## 2021-10-14 PROCEDURE — 84484 ASSAY OF TROPONIN QUANT: CPT | Performed by: EMERGENCY MEDICINE

## 2021-10-14 PROCEDURE — 85025 COMPLETE CBC W/AUTO DIFF WBC: CPT | Performed by: EMERGENCY MEDICINE

## 2021-10-14 PROCEDURE — 70450 CT HEAD/BRAIN W/O DYE: CPT

## 2021-10-14 RX ORDER — AMLODIPINE BESYLATE 5 MG/1
10 TABLET ORAL DAILY
Status: DISCONTINUED | OUTPATIENT
Start: 2021-10-15 | End: 2021-10-15 | Stop reason: HOSPADM

## 2021-10-14 RX ORDER — CLOPIDOGREL BISULFATE 75 MG/1
75 TABLET ORAL DAILY
Status: DISCONTINUED | OUTPATIENT
Start: 2021-10-15 | End: 2021-10-15 | Stop reason: HOSPADM

## 2021-10-14 RX ORDER — HYDRALAZINE HYDROCHLORIDE 20 MG/ML
5 INJECTION INTRAMUSCULAR; INTRAVENOUS EVERY 6 HOURS PRN
Status: DISCONTINUED | OUTPATIENT
Start: 2021-10-14 | End: 2021-10-15 | Stop reason: HOSPADM

## 2021-10-14 RX ORDER — ISOSORBIDE DINITRATE 10 MG/1
10 TABLET ORAL 2 TIMES DAILY
Status: DISCONTINUED | OUTPATIENT
Start: 2021-10-14 | End: 2021-10-15 | Stop reason: HOSPADM

## 2021-10-14 RX ORDER — GABAPENTIN 100 MG/1
100 CAPSULE ORAL 2 TIMES DAILY
Status: DISCONTINUED | OUTPATIENT
Start: 2021-10-14 | End: 2021-10-15 | Stop reason: HOSPADM

## 2021-10-14 RX ORDER — MAGNESIUM HYDROXIDE/ALUMINUM HYDROXICE/SIMETHICONE 120; 1200; 1200 MG/30ML; MG/30ML; MG/30ML
30 SUSPENSION ORAL EVERY 6 HOURS PRN
Status: DISCONTINUED | OUTPATIENT
Start: 2021-10-14 | End: 2021-10-15 | Stop reason: HOSPADM

## 2021-10-14 RX ORDER — LISINOPRIL 10 MG/1
20 TABLET ORAL DAILY
Status: DISCONTINUED | OUTPATIENT
Start: 2021-10-15 | End: 2021-10-15 | Stop reason: HOSPADM

## 2021-10-14 RX ORDER — SODIUM CHLORIDE 9 MG/ML
3 INJECTION INTRAVENOUS
Status: DISCONTINUED | OUTPATIENT
Start: 2021-10-14 | End: 2021-10-15 | Stop reason: HOSPADM

## 2021-10-14 RX ORDER — ACETAMINOPHEN 325 MG/1
650 TABLET ORAL EVERY 6 HOURS PRN
Status: DISCONTINUED | OUTPATIENT
Start: 2021-10-14 | End: 2021-10-15 | Stop reason: HOSPADM

## 2021-10-14 RX ORDER — ONDANSETRON 2 MG/ML
4 INJECTION INTRAMUSCULAR; INTRAVENOUS EVERY 6 HOURS PRN
Status: DISCONTINUED | OUTPATIENT
Start: 2021-10-14 | End: 2021-10-15 | Stop reason: HOSPADM

## 2021-10-14 RX ORDER — LIDOCAINE 50 MG/G
1 PATCH TOPICAL DAILY
Status: DISCONTINUED | OUTPATIENT
Start: 2021-10-15 | End: 2021-10-15 | Stop reason: HOSPADM

## 2021-10-14 RX ORDER — POLYETHYLENE GLYCOL 3350 17 G/17G
17 POWDER, FOR SOLUTION ORAL DAILY PRN
Status: DISCONTINUED | OUTPATIENT
Start: 2021-10-14 | End: 2021-10-15 | Stop reason: HOSPADM

## 2021-10-14 RX ORDER — LIDOCAINE 50 MG/G
1 PATCH TOPICAL ONCE
Status: COMPLETED | OUTPATIENT
Start: 2021-10-14 | End: 2021-10-14

## 2021-10-14 RX ADMIN — HYDRALAZINE HYDROCHLORIDE 5 MG: 20 INJECTION INTRAMUSCULAR; INTRAVENOUS at 20:49

## 2021-10-14 RX ADMIN — LIDOCAINE 5% 1 PATCH: 700 PATCH TOPICAL at 11:18

## 2021-10-14 RX ADMIN — SODIUM CHLORIDE 1000 ML: 0.9 INJECTION, SOLUTION INTRAVENOUS at 11:58

## 2021-10-14 RX ADMIN — IOHEXOL 100 ML: 350 INJECTION, SOLUTION INTRAVENOUS at 10:05

## 2021-10-14 RX ADMIN — APIXABAN 2.5 MG: 2.5 TABLET, FILM COATED ORAL at 17:35

## 2021-10-14 RX ADMIN — ISOSORBIDE DINITRATE 10 MG: 10 TABLET ORAL at 18:11

## 2021-10-14 RX ADMIN — GABAPENTIN 100 MG: 100 CAPSULE ORAL at 17:35

## 2021-10-15 ENCOUNTER — TRANSITIONAL CARE MANAGEMENT (OUTPATIENT)
Dept: INTERNAL MEDICINE CLINIC | Facility: CLINIC | Age: 86
End: 2021-10-15

## 2021-10-15 VITALS
RESPIRATION RATE: 20 BRPM | DIASTOLIC BLOOD PRESSURE: 94 MMHG | TEMPERATURE: 98.3 F | SYSTOLIC BLOOD PRESSURE: 178 MMHG | OXYGEN SATURATION: 97 % | HEART RATE: 82 BPM

## 2021-10-15 LAB
ANION GAP SERPL CALCULATED.3IONS-SCNC: 11 MMOL/L (ref 4–13)
ATRIAL RATE: 33 BPM
ATRIAL RATE: 40 BPM
BUN SERPL-MCNC: 27 MG/DL (ref 5–25)
CALCIUM SERPL-MCNC: 9.5 MG/DL (ref 8.3–10.1)
CHLORIDE SERPL-SCNC: 105 MMOL/L (ref 100–108)
CO2 SERPL-SCNC: 25 MMOL/L (ref 21–32)
CREAT SERPL-MCNC: 1.5 MG/DL (ref 0.6–1.3)
ERYTHROCYTE [DISTWIDTH] IN BLOOD BY AUTOMATED COUNT: 13.2 % (ref 11.6–15.1)
GFR SERPL CREATININE-BSD FRML MDRD: 42 ML/MIN/1.73SQ M
GLUCOSE P FAST SERPL-MCNC: 120 MG/DL (ref 65–99)
GLUCOSE SERPL-MCNC: 120 MG/DL (ref 65–140)
HCT VFR BLD AUTO: 40 % (ref 36.5–49.3)
HGB BLD-MCNC: 13.5 G/DL (ref 12–17)
MCH RBC QN AUTO: 31.8 PG (ref 26.8–34.3)
MCHC RBC AUTO-ENTMCNC: 33.8 G/DL (ref 31.4–37.4)
MCV RBC AUTO: 94 FL (ref 82–98)
P AXIS: 51 DEGREES
PLATELET # BLD AUTO: 196 THOUSANDS/UL (ref 149–390)
PMV BLD AUTO: 9.3 FL (ref 8.9–12.7)
POTASSIUM SERPL-SCNC: 3.9 MMOL/L (ref 3.5–5.3)
PR INTERVAL: 188 MS
PR INTERVAL: 274 MS
QRS AXIS: 77 DEGREES
QRS AXIS: 81 DEGREES
QRSD INTERVAL: 158 MS
QRSD INTERVAL: 168 MS
QT INTERVAL: 540 MS
QT INTERVAL: 562 MS
QTC INTERVAL: 399 MS
QTC INTERVAL: 458 MS
RBC # BLD AUTO: 4.24 MILLION/UL (ref 3.88–5.62)
SODIUM SERPL-SCNC: 141 MMOL/L (ref 136–145)
T WAVE AXIS: 104 DEGREES
T WAVE AXIS: 24 DEGREES
VENTRICULAR RATE: 33 BPM
VENTRICULAR RATE: 40 BPM
WBC # BLD AUTO: 6.75 THOUSAND/UL (ref 4.31–10.16)

## 2021-10-15 PROCEDURE — 80048 BASIC METABOLIC PNL TOTAL CA: CPT | Performed by: PHYSICIAN ASSISTANT

## 2021-10-15 PROCEDURE — 99217 PR OBSERVATION CARE DISCHARGE MANAGEMENT: CPT | Performed by: PHYSICIAN ASSISTANT

## 2021-10-15 PROCEDURE — 85027 COMPLETE CBC AUTOMATED: CPT | Performed by: PHYSICIAN ASSISTANT

## 2021-10-15 PROCEDURE — 36415 COLL VENOUS BLD VENIPUNCTURE: CPT | Performed by: PHYSICIAN ASSISTANT

## 2021-10-15 PROCEDURE — 93010 ELECTROCARDIOGRAM REPORT: CPT | Performed by: INTERNAL MEDICINE

## 2021-10-15 PROCEDURE — 97163 PT EVAL HIGH COMPLEX 45 MIN: CPT

## 2021-10-15 PROCEDURE — 97166 OT EVAL MOD COMPLEX 45 MIN: CPT

## 2021-10-15 RX ORDER — LABETALOL 20 MG/4 ML (5 MG/ML) INTRAVENOUS SYRINGE
10 ONCE
Status: DISCONTINUED | OUTPATIENT
Start: 2021-10-15 | End: 2021-10-15

## 2021-10-15 RX ORDER — HYDRALAZINE HYDROCHLORIDE 20 MG/ML
10 INJECTION INTRAMUSCULAR; INTRAVENOUS ONCE
Status: COMPLETED | OUTPATIENT
Start: 2021-10-15 | End: 2021-10-15

## 2021-10-15 RX ORDER — LIDOCAINE 50 MG/G
1 PATCH TOPICAL DAILY
Qty: 30 PATCH | Refills: 0 | Status: SHIPPED | OUTPATIENT
Start: 2021-10-15 | End: 2021-11-23 | Stop reason: CLARIF

## 2021-10-15 RX ADMIN — GABAPENTIN 100 MG: 100 CAPSULE ORAL at 10:30

## 2021-10-15 RX ADMIN — CLOPIDOGREL BISULFATE 75 MG: 75 TABLET ORAL at 10:30

## 2021-10-15 RX ADMIN — APIXABAN 2.5 MG: 2.5 TABLET, FILM COATED ORAL at 10:30

## 2021-10-15 RX ADMIN — ISOSORBIDE DINITRATE 10 MG: 10 TABLET ORAL at 10:29

## 2021-10-15 RX ADMIN — HYDRALAZINE HYDROCHLORIDE 10 MG: 20 INJECTION INTRAMUSCULAR; INTRAVENOUS at 03:43

## 2021-10-15 RX ADMIN — AMLODIPINE BESYLATE 10 MG: 5 TABLET ORAL at 10:30

## 2021-10-15 RX ADMIN — LISINOPRIL 20 MG: 10 TABLET ORAL at 10:30

## 2021-10-15 RX ADMIN — CYANOCOBALAMIN TAB 500 MCG 1000 MCG: 500 TAB at 10:29

## 2021-10-15 RX ADMIN — LIDOCAINE 5% 1 PATCH: 700 PATCH TOPICAL at 10:31

## 2021-10-22 ENCOUNTER — OFFICE VISIT (OUTPATIENT)
Dept: SURGERY | Facility: CLINIC | Age: 86
End: 2021-10-22
Payer: MEDICARE

## 2021-10-22 VITALS
HEIGHT: 70 IN | DIASTOLIC BLOOD PRESSURE: 80 MMHG | HEART RATE: 44 BPM | SYSTOLIC BLOOD PRESSURE: 136 MMHG | WEIGHT: 216 LBS | BODY MASS INDEX: 30.92 KG/M2 | TEMPERATURE: 97.8 F | RESPIRATION RATE: 16 BRPM

## 2021-10-22 DIAGNOSIS — L72.3 INFECTED SEBACEOUS CYST OF SKIN: Primary | ICD-10-CM

## 2021-10-22 DIAGNOSIS — L08.9 INFECTED SEBACEOUS CYST OF SKIN: Primary | ICD-10-CM

## 2021-10-22 PROCEDURE — 99213 OFFICE O/P EST LOW 20 MIN: CPT | Performed by: STUDENT IN AN ORGANIZED HEALTH CARE EDUCATION/TRAINING PROGRAM

## 2021-11-12 ENCOUNTER — IMMUNIZATIONS (OUTPATIENT)
Dept: FAMILY MEDICINE CLINIC | Facility: HOSPITAL | Age: 86
End: 2021-11-12

## 2021-11-12 DIAGNOSIS — Z23 ENCOUNTER FOR IMMUNIZATION: Primary | ICD-10-CM

## 2021-11-12 PROCEDURE — 0001A COVID-19 PFIZER VACC 0.3 ML: CPT

## 2021-11-12 PROCEDURE — 91300 COVID-19 PFIZER VACC 0.3 ML: CPT

## 2021-11-17 ENCOUNTER — APPOINTMENT (OUTPATIENT)
Dept: LAB | Facility: HOSPITAL | Age: 86
End: 2021-11-17
Payer: MEDICARE

## 2021-11-17 DIAGNOSIS — N18.32 TYPE 2 DIABETES MELLITUS WITH STAGE 3B CHRONIC KIDNEY DISEASE, WITHOUT LONG-TERM CURRENT USE OF INSULIN (HCC): Chronic | ICD-10-CM

## 2021-11-17 DIAGNOSIS — R80.8 OTHER PROTEINURIA: Chronic | ICD-10-CM

## 2021-11-17 DIAGNOSIS — N18.4 HYPERTENSIVE KIDNEY DISEASE WITH STAGE 4 CHRONIC KIDNEY DISEASE (HCC): ICD-10-CM

## 2021-11-17 DIAGNOSIS — N18.4 STAGE 4 CHRONIC KIDNEY DISEASE (HCC): ICD-10-CM

## 2021-11-17 DIAGNOSIS — I12.9 HYPERTENSIVE KIDNEY DISEASE WITH STAGE 4 CHRONIC KIDNEY DISEASE (HCC): ICD-10-CM

## 2021-11-17 DIAGNOSIS — E55.9 VITAMIN D DEFICIENCY: Chronic | ICD-10-CM

## 2021-11-17 DIAGNOSIS — E11.22 TYPE 2 DIABETES MELLITUS WITH STAGE 3B CHRONIC KIDNEY DISEASE, WITHOUT LONG-TERM CURRENT USE OF INSULIN (HCC): Chronic | ICD-10-CM

## 2021-11-17 DIAGNOSIS — R55 SYNCOPE: ICD-10-CM

## 2021-11-17 LAB
25(OH)D3 SERPL-MCNC: 35.2 NG/ML (ref 30–100)
ANION GAP SERPL CALCULATED.3IONS-SCNC: 7 MMOL/L (ref 4–13)
BACTERIA UR QL AUTO: ABNORMAL /HPF
BASOPHILS # BLD AUTO: 0.04 THOUSANDS/ΜL (ref 0–0.1)
BASOPHILS NFR BLD AUTO: 1 % (ref 0–1)
BILIRUB UR QL STRIP: NEGATIVE
BUN SERPL-MCNC: 35 MG/DL (ref 5–25)
CALCIUM SERPL-MCNC: 8.8 MG/DL (ref 8.3–10.1)
CHLORIDE SERPL-SCNC: 103 MMOL/L (ref 100–108)
CHOLEST SERPL-MCNC: 205 MG/DL (ref 50–200)
CLARITY UR: CLEAR
CO2 SERPL-SCNC: 29 MMOL/L (ref 21–32)
COLOR UR: YELLOW
CREAT SERPL-MCNC: 1.94 MG/DL (ref 0.6–1.3)
CREAT UR-MCNC: 37.5 MG/DL
EOSINOPHIL # BLD AUTO: 0.56 THOUSAND/ΜL (ref 0–0.61)
EOSINOPHIL NFR BLD AUTO: 10 % (ref 0–6)
ERYTHROCYTE [DISTWIDTH] IN BLOOD BY AUTOMATED COUNT: 12.4 % (ref 11.6–15.1)
EST. AVERAGE GLUCOSE BLD GHB EST-MCNC: 137 MG/DL
GFR SERPL CREATININE-BSD FRML MDRD: 31 ML/MIN/1.73SQ M
GLUCOSE P FAST SERPL-MCNC: 94 MG/DL (ref 65–99)
GLUCOSE UR STRIP-MCNC: NEGATIVE MG/DL
HBA1C MFR BLD: 6.4 %
HCT VFR BLD AUTO: 39.6 % (ref 36.5–49.3)
HDLC SERPL-MCNC: 50 MG/DL
HGB BLD-MCNC: 12.7 G/DL (ref 12–17)
HGB UR QL STRIP.AUTO: ABNORMAL
IMM GRANULOCYTES # BLD AUTO: 0.01 THOUSAND/UL (ref 0–0.2)
IMM GRANULOCYTES NFR BLD AUTO: 0 % (ref 0–2)
KETONES UR STRIP-MCNC: NEGATIVE MG/DL
LDLC SERPL CALC-MCNC: 133 MG/DL (ref 0–100)
LEUKOCYTE ESTERASE UR QL STRIP: NEGATIVE
LYMPHOCYTES # BLD AUTO: 1.96 THOUSANDS/ΜL (ref 0.6–4.47)
LYMPHOCYTES NFR BLD AUTO: 33 % (ref 14–44)
MCH RBC QN AUTO: 31.2 PG (ref 26.8–34.3)
MCHC RBC AUTO-ENTMCNC: 32.1 G/DL (ref 31.4–37.4)
MCV RBC AUTO: 97 FL (ref 82–98)
MICROALBUMIN UR-MCNC: 298 MG/L (ref 0–20)
MICROALBUMIN/CREAT 24H UR: 795 MG/G CREATININE (ref 0–30)
MONOCYTES # BLD AUTO: 0.57 THOUSAND/ΜL (ref 0.17–1.22)
MONOCYTES NFR BLD AUTO: 10 % (ref 4–12)
NEUTROPHILS # BLD AUTO: 2.72 THOUSANDS/ΜL (ref 1.85–7.62)
NEUTS SEG NFR BLD AUTO: 46 % (ref 43–75)
NITRITE UR QL STRIP: NEGATIVE
NON-SQ EPI CELLS URNS QL MICRO: ABNORMAL /HPF
NONHDLC SERPL-MCNC: 155 MG/DL
NRBC BLD AUTO-RTO: 0 /100 WBCS
PH UR STRIP.AUTO: 6 [PH]
PHOSPHATE SERPL-MCNC: 3.3 MG/DL (ref 2.3–4.1)
PLATELET # BLD AUTO: 192 THOUSANDS/UL (ref 149–390)
PMV BLD AUTO: 9.8 FL (ref 8.9–12.7)
POTASSIUM SERPL-SCNC: 4.5 MMOL/L (ref 3.5–5.3)
PROT UR STRIP-MCNC: ABNORMAL MG/DL
PTH-INTACT SERPL-MCNC: 45.6 PG/ML (ref 18.4–80.1)
RBC # BLD AUTO: 4.07 MILLION/UL (ref 3.88–5.62)
RBC #/AREA URNS AUTO: ABNORMAL /HPF
SODIUM SERPL-SCNC: 139 MMOL/L (ref 136–145)
SP GR UR STRIP.AUTO: <=1.005 (ref 1–1.03)
TRIGL SERPL-MCNC: 110 MG/DL
URATE SERPL-MCNC: 7 MG/DL (ref 4.2–8)
UROBILINOGEN UR QL STRIP.AUTO: 0.2 E.U./DL
WBC # BLD AUTO: 5.86 THOUSAND/UL (ref 4.31–10.16)
WBC #/AREA URNS AUTO: ABNORMAL /HPF

## 2021-11-17 PROCEDURE — 82570 ASSAY OF URINE CREATININE: CPT

## 2021-11-17 PROCEDURE — 84550 ASSAY OF BLOOD/URIC ACID: CPT

## 2021-11-17 PROCEDURE — 36415 COLL VENOUS BLD VENIPUNCTURE: CPT

## 2021-11-17 PROCEDURE — 81001 URINALYSIS AUTO W/SCOPE: CPT

## 2021-11-17 PROCEDURE — 80048 BASIC METABOLIC PNL TOTAL CA: CPT

## 2021-11-17 PROCEDURE — 80061 LIPID PANEL: CPT

## 2021-11-17 PROCEDURE — 83970 ASSAY OF PARATHORMONE: CPT

## 2021-11-17 PROCEDURE — 82043 UR ALBUMIN QUANTITATIVE: CPT

## 2021-11-17 PROCEDURE — 83036 HEMOGLOBIN GLYCOSYLATED A1C: CPT

## 2021-11-17 PROCEDURE — 82306 VITAMIN D 25 HYDROXY: CPT

## 2021-11-17 PROCEDURE — 85025 COMPLETE CBC W/AUTO DIFF WBC: CPT

## 2021-11-17 PROCEDURE — 84100 ASSAY OF PHOSPHORUS: CPT

## 2021-11-23 ENCOUNTER — OFFICE VISIT (OUTPATIENT)
Dept: INTERNAL MEDICINE CLINIC | Facility: CLINIC | Age: 86
End: 2021-11-23
Payer: MEDICARE

## 2021-11-23 VITALS
BODY MASS INDEX: 31.21 KG/M2 | HEIGHT: 70 IN | OXYGEN SATURATION: 94 % | SYSTOLIC BLOOD PRESSURE: 124 MMHG | HEART RATE: 42 BPM | DIASTOLIC BLOOD PRESSURE: 57 MMHG | WEIGHT: 218 LBS | TEMPERATURE: 96.6 F

## 2021-11-23 DIAGNOSIS — R26.2 AMBULATORY DYSFUNCTION: ICD-10-CM

## 2021-11-23 DIAGNOSIS — N18.32 TYPE 2 DIABETES MELLITUS WITH STAGE 3B CHRONIC KIDNEY DISEASE, WITHOUT LONG-TERM CURRENT USE OF INSULIN (HCC): Primary | Chronic | ICD-10-CM

## 2021-11-23 DIAGNOSIS — I10 ESSENTIAL HYPERTENSION: ICD-10-CM

## 2021-11-23 DIAGNOSIS — Z91.81 AT RISK FOR FALLS: ICD-10-CM

## 2021-11-23 DIAGNOSIS — E11.22 TYPE 2 DIABETES MELLITUS WITH STAGE 3B CHRONIC KIDNEY DISEASE, WITHOUT LONG-TERM CURRENT USE OF INSULIN (HCC): Primary | Chronic | ICD-10-CM

## 2021-11-23 DIAGNOSIS — G89.4 CHRONIC PAIN SYNDROME: ICD-10-CM

## 2021-11-23 DIAGNOSIS — R00.1 BRADYCARDIA: ICD-10-CM

## 2021-11-23 DIAGNOSIS — I25.10 CORONARY ARTERY DISEASE INVOLVING NATIVE CORONARY ARTERY OF NATIVE HEART WITHOUT ANGINA PECTORIS: ICD-10-CM

## 2021-11-23 DIAGNOSIS — Z00.00 MEDICARE ANNUAL WELLNESS VISIT, SUBSEQUENT: ICD-10-CM

## 2021-11-23 PROCEDURE — G0444 DEPRESSION SCREEN ANNUAL: HCPCS | Performed by: INTERNAL MEDICINE

## 2021-11-23 PROCEDURE — 99214 OFFICE O/P EST MOD 30 MIN: CPT | Performed by: INTERNAL MEDICINE

## 2021-11-23 PROCEDURE — G0439 PPPS, SUBSEQ VISIT: HCPCS | Performed by: INTERNAL MEDICINE

## 2021-11-23 RX ORDER — LISINOPRIL 20 MG/1
20 TABLET ORAL DAILY
Qty: 90 TABLET | Refills: 3 | Status: SHIPPED | OUTPATIENT
Start: 2021-11-23 | End: 2022-11-18

## 2021-11-23 RX ORDER — GABAPENTIN 100 MG/1
100 CAPSULE ORAL 2 TIMES DAILY
Qty: 180 CAPSULE | Refills: 3 | Status: SHIPPED | OUTPATIENT
Start: 2021-11-23

## 2021-11-30 ENCOUNTER — OFFICE VISIT (OUTPATIENT)
Dept: CARDIOLOGY CLINIC | Facility: CLINIC | Age: 86
End: 2021-11-30
Payer: MEDICARE

## 2021-11-30 VITALS
HEIGHT: 70 IN | WEIGHT: 213 LBS | RESPIRATION RATE: 16 BRPM | OXYGEN SATURATION: 96 % | DIASTOLIC BLOOD PRESSURE: 78 MMHG | HEART RATE: 58 BPM | BODY MASS INDEX: 30.49 KG/M2 | SYSTOLIC BLOOD PRESSURE: 140 MMHG

## 2021-11-30 DIAGNOSIS — Z79.01 CHRONIC ANTICOAGULATION: ICD-10-CM

## 2021-11-30 DIAGNOSIS — I35.0 NONRHEUMATIC AORTIC VALVE STENOSIS: ICD-10-CM

## 2021-11-30 DIAGNOSIS — I10 ESSENTIAL HYPERTENSION: ICD-10-CM

## 2021-11-30 DIAGNOSIS — I48.0 PAROXYSMAL ATRIAL FIBRILLATION (HCC): ICD-10-CM

## 2021-11-30 DIAGNOSIS — I25.10 CORONARY ARTERY DISEASE INVOLVING NATIVE CORONARY ARTERY OF NATIVE HEART WITHOUT ANGINA PECTORIS: Primary | ICD-10-CM

## 2021-11-30 PROCEDURE — 99214 OFFICE O/P EST MOD 30 MIN: CPT | Performed by: INTERNAL MEDICINE

## 2021-11-30 RX ORDER — ASPIRIN 81 MG/1
81 TABLET ORAL DAILY
Start: 2021-11-30

## 2021-12-03 DIAGNOSIS — I48.0 PAROXYSMAL ATRIAL FIBRILLATION (HCC): ICD-10-CM

## 2021-12-08 ENCOUNTER — OFFICE VISIT (OUTPATIENT)
Dept: NEPHROLOGY | Facility: CLINIC | Age: 86
End: 2021-12-08
Payer: MEDICARE

## 2021-12-08 VITALS
DIASTOLIC BLOOD PRESSURE: 74 MMHG | TEMPERATURE: 95.6 F | BODY MASS INDEX: 30.44 KG/M2 | RESPIRATION RATE: 16 BRPM | WEIGHT: 217.4 LBS | SYSTOLIC BLOOD PRESSURE: 154 MMHG | HEART RATE: 62 BPM | HEIGHT: 71 IN

## 2021-12-08 DIAGNOSIS — N18.32 HYPERTENSIVE KIDNEY DISEASE WITH STAGE 3B CHRONIC KIDNEY DISEASE (HCC): ICD-10-CM

## 2021-12-08 DIAGNOSIS — I12.9 HYPERTENSIVE KIDNEY DISEASE WITH STAGE 3B CHRONIC KIDNEY DISEASE (HCC): ICD-10-CM

## 2021-12-08 DIAGNOSIS — N18.32 STAGE 3B CHRONIC KIDNEY DISEASE (HCC): Primary | Chronic | ICD-10-CM

## 2021-12-08 DIAGNOSIS — R80.8 OTHER PROTEINURIA: ICD-10-CM

## 2021-12-08 DIAGNOSIS — E55.9 VITAMIN D DEFICIENCY: ICD-10-CM

## 2021-12-08 PROCEDURE — 99214 OFFICE O/P EST MOD 30 MIN: CPT | Performed by: INTERNAL MEDICINE

## 2021-12-10 ENCOUNTER — TELEPHONE (OUTPATIENT)
Dept: GERIATRICS | Age: 86
End: 2021-12-10

## 2021-12-13 ENCOUNTER — TELEPHONE (OUTPATIENT)
Dept: CARDIOLOGY CLINIC | Facility: CLINIC | Age: 86
End: 2021-12-13

## 2021-12-29 NOTE — PROGRESS NOTES
12/13/17 1330   Pain Assessment   Pain Assessment 0-10   Pain Score 4   Pain Type Acute pain   Pain Location Hip   Pain Orientation Right   Pain Descriptors Aching   Pain Frequency Constant/continuous   Clinical Progression Not changed   Patient's Stated Pain Goal No pain   Hospital Pain Intervention(s) Cold applied; Ambulation/increased activity; Rest  (ice applied to R hip at end of session)   Restrictions/Precautions   Precautions Bed/chair alarms; Fall Risk;Limb alert;THR  (LUE limb alert)   Weight Bearing Restrictions Yes   RLE Weight Bearing Per Order WBAT   ROM Restrictions Yes   RLE ROM Restriction Range Limitation  (MAREN precautions)   Cognition   Overall Cognitive Status Impaired   Arousal/Participation Alert; Cooperative   Attention Attends with cues to redirect   Orientation Level Oriented X4   Memory Within functional limits   Following Commands Follows all commands and directions without difficulty   Subjective   Subjective co pain in R hip, ice applied at end of session; reports rest is what relieves pain and that he is expecting medication at 1500   QI: Sit to [address] Provided by [place] No physical assistance   Sit to Lying CARE Score 4   QI: Lying to Sitting on Side of Bed   Assistance Needed Supervision   Assistance Provided by [place] No physical assistance   Lying to Sitting on Side of Bed CARE Score 4   QI: Sit to Stand   Assistance Needed Adaptive equipment;Supervision   Sit to Stand CARE Score 4   QI: Chair/Bed-to-Chair Transfer   Assistance Needed Adaptive equipment;Supervision   Chair/Bed-to-Chair Transfer CARE Score 4   Transfer Bed/Chair/Wheelchair   Limitations Noted In UE Strength;LE Strength; Endurance   Adaptive Equipment Roller Walker   Stand Pivot Supervision   Sit to Stand Supervision   Stand to Sit Supervision   Supine to Sit Supervision   Sit to Supine Supervision   Car Transfer Supervision   All Transfer Supervision   Findings S   Bed, Chair, Wheelchair Transfer (FIM) 5 - Patient requires supervision/monitoring   QI: [address] Provided by [place] No physical assistance   Car Transfer CARE Score 4   QI: Walk 10 Feet   Assistance Needed Adaptive equipment;Supervision   Walk 10 Feet CARE Score 4   QI: Walk 50 Feet with Two Turns   Assistance Needed Adaptive equipment;Supervision   Walk 50 Feet with Two Turns CARE Score 4   QI: Walk 150 Feet   Assistance Needed Adaptive equipment;Supervision   Walk 150 Feet CARE Score 4   Ambulation   Does the patient walk? 2  Yes   Primary Discharge Mode of Locomotion Walk   Walk Assist Level Supervision   Gait Pattern Inconsistant Ally; Slow Ally;Poor UE WB;Step to;Decreased R stance; Improper weight shift   Assist Device Roller Marlena Harrington Walked (feet) 150 ft   Limitations Noted In Endurance; Heel Strike;Speed;Strength;Swing   Findings decreased stance on RLE   Walking (FIM) 5 - Patient requires supervision/monitoring AND distance 150 feet or more, no rest   QI: 1 Step (Curb)   Assistance Needed Supervision; Adaptive equipment   Comment [facility]   1 Step (Curb) CARE Score 4   QI: 4 Steps   Assistance Needed Adaptive equipment;Supervision   Comment R   4 Steps CARE Score 4   Stairs   Type Stairs;Curb;Ramp  ( 6" steps)   # of Steps 4   Weight Bearing Precautions WBAT;RLE;Fall Risk   Assist Devices Bilateral Rail   Stairs (FIM) 2 - Patient goes up and down 4 - 11 stairs regardless of assist/device/setup   QI: Toilet Transfer   Assistance Needed Supervision   Comment stood at toilet to urinate    Toilet Transfer CARE Score 4   Toilet Transfer   Surface Assessed Standard Toilet   Limitations Noted In [address]   Findings stood to urinate  (x2; at standard toilet and with urinal)   Toilet Transfer (FIM) 5 - Patient requires supervision/monitoring   Therapeutic Interventions   Strengthening SLR 10x2 supine, glute sets 10x2   Neuromuscular Re-Education car xfer, stairs, xfer <> mat   Assessment   Treatment Assessment session focused on functional activities; pt is S when urinating at toilet and standing in front of chair to use the urinal; limited by pain and tightness in RLE, stretching as tolerated may be of benefit; pt fatigues during activity easily requiring breaks; car xfer required a cushion to follow hip precautions and simulate a higher vehicle seat; mat exercises are beneficial to increase hip extension strength and it is reported to be comfortable for the pt; pt performs STS xfers at S level with RW and has increased safety awareness for sequencing and his precautions; ice on R hip at end of session; continue POC as per PT   Problem List Decreased strength;Decreased endurance; Impaired balance;Decreased mobility; Decreased safety awareness;Pain   Barriers to Discharge Decreased caregiver support   PT Barriers   Physical Impairment Decreased strength;Decreased endurance; Impaired balance;Decreased mobility; Decreased safety awareness   Functional Limitation Walking   Plan   Treatment/Interventions Functional transfer training;LE strengthening/ROM; Elevations; Therapeutic exercise; Endurance training;Patient/family training;Bed mobility   Progress Progressing toward goals   Recommendation   Recommendation Outpatient PT;24 hour supervision/assist   Equipment Recommended Walker   PT Therapy Minutes   PT Time In 1330   PT Time Out 1430   PT Total Time (minutes) 60   PT Mode of treatment - Individual (minutes) 60   PT Mode of treatment - Concurrent (minutes) 0   PT Mode of treatment - Group (minutes) 0   PT Mode of treatment - Co-treat (minutes) 0   PT Mode of Teatment - Total time(minutes) 60 minutes   Therapy Time missed   Time missed?  No     01/04/18  Delisa Dyson PTA  Minutes changed from 30 individual to 60 individual  Structured MSE

## 2022-01-28 DIAGNOSIS — I25.10 CORONARY ARTERY DISEASE INVOLVING NATIVE CORONARY ARTERY OF NATIVE HEART WITHOUT ANGINA PECTORIS: ICD-10-CM

## 2022-02-08 ENCOUNTER — TELEPHONE (OUTPATIENT)
Dept: GERIATRICS | Age: 87
End: 2022-02-08

## 2022-02-08 NOTE — TELEPHONE ENCOUNTER
Spoke with pt daughter dolores, she state that Marlakristy Angus is forgetful at times   She will call back to schedule a appt after speaking with her bother

## 2022-02-14 DIAGNOSIS — I25.10 CORONARY ARTERY DISEASE INVOLVING NATIVE CORONARY ARTERY OF NATIVE HEART WITHOUT ANGINA PECTORIS: ICD-10-CM

## 2022-02-14 NOTE — TELEPHONE ENCOUNTER
Metoprolol tartrate was put in to be ordered on: 1/28 by Dr Judith Caldwell    To be sent to [facility]; it wasn't sent because it has Class: No print     Please forward this to [facility]

## 2022-03-01 ENCOUNTER — APPOINTMENT (OUTPATIENT)
Dept: LAB | Facility: HOSPITAL | Age: 87
End: 2022-03-01
Payer: MEDICARE

## 2022-03-01 DIAGNOSIS — N18.32 TYPE 2 DIABETES MELLITUS WITH STAGE 3B CHRONIC KIDNEY DISEASE, WITHOUT LONG-TERM CURRENT USE OF INSULIN (HCC): Chronic | ICD-10-CM

## 2022-03-01 DIAGNOSIS — E11.22 TYPE 2 DIABETES MELLITUS WITH STAGE 3B CHRONIC KIDNEY DISEASE, WITHOUT LONG-TERM CURRENT USE OF INSULIN (HCC): Chronic | ICD-10-CM

## 2022-03-01 LAB
ANION GAP SERPL CALCULATED.3IONS-SCNC: 5 MMOL/L (ref 4–13)
BUN SERPL-MCNC: 30 MG/DL (ref 5–25)
CALCIUM SERPL-MCNC: 9.4 MG/DL (ref 8.3–10.1)
CHLORIDE SERPL-SCNC: 102 MMOL/L (ref 100–108)
CO2 SERPL-SCNC: 30 MMOL/L (ref 21–32)
CREAT SERPL-MCNC: 1.87 MG/DL (ref 0.6–1.3)
GFR SERPL CREATININE-BSD FRML MDRD: 32 ML/MIN/1.73SQ M
GLUCOSE P FAST SERPL-MCNC: 116 MG/DL (ref 65–99)
POTASSIUM SERPL-SCNC: 4.4 MMOL/L (ref 3.5–5.3)
SODIUM SERPL-SCNC: 137 MMOL/L (ref 136–145)

## 2022-03-01 PROCEDURE — 80048 BASIC METABOLIC PNL TOTAL CA: CPT

## 2022-03-01 PROCEDURE — 83036 HEMOGLOBIN GLYCOSYLATED A1C: CPT

## 2022-03-01 PROCEDURE — 36415 COLL VENOUS BLD VENIPUNCTURE: CPT

## 2022-03-02 LAB
EST. AVERAGE GLUCOSE BLD GHB EST-MCNC: 134 MG/DL
HBA1C MFR BLD: 6.3 %

## 2022-03-04 ENCOUNTER — TELEPHONE (OUTPATIENT)
Dept: CARDIOLOGY CLINIC | Facility: CLINIC | Age: 87
End: 2022-03-04

## 2022-03-04 ENCOUNTER — OFFICE VISIT (OUTPATIENT)
Dept: INTERNAL MEDICINE CLINIC | Facility: CLINIC | Age: 87
End: 2022-03-04
Payer: MEDICARE

## 2022-03-04 VITALS
OXYGEN SATURATION: 99 % | DIASTOLIC BLOOD PRESSURE: 70 MMHG | BODY MASS INDEX: 30.72 KG/M2 | WEIGHT: 219.4 LBS | HEART RATE: 68 BPM | HEIGHT: 71 IN | TEMPERATURE: 97.7 F | SYSTOLIC BLOOD PRESSURE: 140 MMHG

## 2022-03-04 DIAGNOSIS — E11.69 MIXED HYPERLIPIDEMIA DUE TO TYPE 2 DIABETES MELLITUS (HCC): Chronic | ICD-10-CM

## 2022-03-04 DIAGNOSIS — E11.22 TYPE 2 DIABETES MELLITUS WITH STAGE 3B CHRONIC KIDNEY DISEASE, WITHOUT LONG-TERM CURRENT USE OF INSULIN (HCC): Primary | ICD-10-CM

## 2022-03-04 DIAGNOSIS — I12.9 HYPERTENSIVE KIDNEY DISEASE WITH STAGE 3B CHRONIC KIDNEY DISEASE (HCC): ICD-10-CM

## 2022-03-04 DIAGNOSIS — I48.0 PAROXYSMAL ATRIAL FIBRILLATION (HCC): ICD-10-CM

## 2022-03-04 DIAGNOSIS — N18.32 TYPE 2 DIABETES MELLITUS WITH STAGE 3B CHRONIC KIDNEY DISEASE, WITHOUT LONG-TERM CURRENT USE OF INSULIN (HCC): Primary | ICD-10-CM

## 2022-03-04 DIAGNOSIS — E78.2 MIXED HYPERLIPIDEMIA DUE TO TYPE 2 DIABETES MELLITUS (HCC): Chronic | ICD-10-CM

## 2022-03-04 DIAGNOSIS — I25.10 CORONARY ARTERY DISEASE INVOLVING NATIVE CORONARY ARTERY OF NATIVE HEART WITHOUT ANGINA PECTORIS: ICD-10-CM

## 2022-03-04 DIAGNOSIS — H90.3 SENSORINEURAL HEARING LOSS (SNHL) OF BOTH EARS: ICD-10-CM

## 2022-03-04 DIAGNOSIS — N18.32 HYPERTENSIVE KIDNEY DISEASE WITH STAGE 3B CHRONIC KIDNEY DISEASE (HCC): ICD-10-CM

## 2022-03-04 DIAGNOSIS — M25.552 LEFT HIP PAIN: ICD-10-CM

## 2022-03-04 PROBLEM — L08.9 INFECTED SEBACEOUS CYST OF SKIN: Status: RESOLVED | Noted: 2021-06-08 | Resolved: 2022-03-04

## 2022-03-04 PROBLEM — L72.3 INFECTED SEBACEOUS CYST OF SKIN: Status: RESOLVED | Noted: 2021-06-08 | Resolved: 2022-03-04

## 2022-03-04 PROBLEM — S22.49XA RIB FRACTURES: Status: RESOLVED | Noted: 2020-07-31 | Resolved: 2022-03-04

## 2022-03-04 PROCEDURE — 99214 OFFICE O/P EST MOD 30 MIN: CPT | Performed by: INTERNAL MEDICINE

## 2022-03-04 NOTE — TELEPHONE ENCOUNTER
Received letter from [facility] stating they have received the assistance form  Scanned into chart

## 2022-03-04 NOTE — PROGRESS NOTES
[place]    NAME: Tiffani Barrett  AGE: 82 y o  SEX: male  : 1935     DATE: 3/4/2022     Assessment and Plan:     1  Type 2 diabetes mellitus with stage 3b chronic kidney disease, without long-term current use of insulin ([facility] )    Most recent A1c was 6 3 % on 3/1/2022  Diabetes remains well controlled  Continue amaryl  Renal function holding steady with Cr around 1 8-1 9  Follows regularly with nephrology  Continue to avoid nephrotoxins  Monitor closely  - Hemoglobin A1C; Future  - Basic metabolic panel; Future    2  Hypertensive kidney disease with stage 3b chronic kidney disease (HCC)    BP acceptable at his age  Cr stable and will be monitored  3  Paroxysmal atrial fibrillation (HCC)    Continue low dose eliquis and continue lopressor    4  Coronary artery disease involving native coronary artery of native heart without angina pectoris    Stable w/o angina  Continue current cardiac medications  5  Mixed hyperlipidemia due to type 2 diabetes mellitus ([facility] )    Continue to monitor  Intolerant to statins     - Lipid Panel with Direct LDL reflex; Future    6  Left hip pain    Check x-ray and will refer to orthopedics     - Ambulatory Referral to Orthopedic Surgery; Future  - XR hip/pelv 2-3 vws left if performed; Future    7  Sensorineural hearing loss (SNHL) of both ears    Refer to audiology  - Ambulatory Referral to Audiology; Future        Return in about 4 months (around 2022) for Follow-up  Chief Complaint:     Chief Complaint   Patient presents with    Follow-up     hip pain        History of Present Illness:       Angie Arce presents for f/u  Having increased left hip pain  Has undergone hip operation 15 years ago  Hasn't seen orthopedics since that time  He feels several months ago and broke his ribs  Doesn't remember injuring his hip at that time  Has underlying kidney disease so anti-inflammatory options for pain are limited  Hurts more towards the posterior hip  Diabetes and renal function remain stable  Also noticing worsening hearing loss and he hasn't seen an audiologist      Review of Systems:     Review of Systems   Constitutional: Negative  HENT: Positive for hearing loss  Respiratory: Negative  Cardiovascular: Negative  Gastrointestinal: Negative  Musculoskeletal: Positive for arthralgias and gait problem  Objective:     /70   Pulse 68   Temp 97 7 °F (36 5 °C)   Ht 5' 10 5" (1 791 m)   Wt 99 5 kg (219 lb 6 4 oz)   SpO2 99%   BMI 31 04 kg/m²     Physical Exam  Constitutional:       General: He is not in acute distress  Appearance: He is not ill-appearing  Cardiovascular:      Rate and Rhythm: Normal rate and regular rhythm  Heart sounds: No murmur heard  Pulmonary:      Effort: Pulmonary effort is normal  No respiratory distress  Breath sounds: No wheezing  Abdominal:      General: Bowel sounds are normal  There is no distension  Tenderness: There is no abdominal tenderness  Musculoskeletal:         General: Tenderness (left posterior hip) present  Right lower leg: No edema  Left lower leg: No edema  Neurological:      Mental Status: He is alert         Lamin Smith DO  [facility]

## 2022-03-04 NOTE — PATIENT INSTRUCTIONS
10% - bad control"> 10% - bad control,Hemoglobin A1c (HbA1c) greater than 10% indicating poor diabetic control,Haemoglobin A1c greater than 10% indicating poor diabetic control">   Diabetes Mellitus Type 2 in Adults, Ambulatory Care   GENERAL INFORMATION:   Diabetes mellitus type 2  is a disease that affects how your body uses glucose (sugar)  Insulin helps move sugar out of the blood so it can be used for energy  Normally, when the blood sugar level increases, the pancreas makes more insulin  Type 2 diabetes develops because either the body cannot make enough insulin, or it cannot use the insulin correctly  After many years, your pancreas may stop making insulin  Common symptoms include the following:   · More hunger or thirst than usual     · Frequent urination     · Weight loss without trying     · Blurred vision  Seek immediate care for the following symptoms:   · Severe abdominal pain, or pain that spreads to your back  You may also be vomiting  · Trouble staying awake or focusing    · Shaking or sweating    · Blurred or double vision    · Breath has a fruity, sweet smell    · Breathing is deep and labored, or rapid and shallow    · Heartbeat is fast and weak  Treatment for diabetes mellitus type 2  includes keeping your blood sugar at a normal level  You must eat the right foods, and exercise regularly  You may also need medicine if you cannot control your blood sugar level with nutrition and exercise  Manage diabetes mellitus type 2:   · Check your blood sugar level  You will be taught how to check a small drop of blood in a glucose monitor  Ask your healthcare provider when and how often to check during the day  Ask your healthcare provider what your blood sugar levels should be when you check them  · Keep track of carbohydrates (sugar and starchy foods)  Your blood sugar level can get too high if you eat too many carbohydrates   Your dietitian will help you plan meals and snacks that have the right amount of carbohydrates  · Eat low-fat foods  Some examples are skinless chicken and low-fat milk  · Eat less sodium (salt)  Some examples of high-sodium foods to limit are soy sauce, potato chips, and soup  Do not add salt to food you cook  Limit your use of table salt  · Eat high-fiber foods  Foods that are a good source of fiber include vegetables, whole grain bread, and beans  · Limit alcohol  Alcohol affects your blood sugar level and can make it harder to manage your diabetes  Women should limit alcohol to 1 drink a day  Men should limit alcohol to 2 drinks a day  A drink of alcohol is 12 ounces of beer, 5 ounces of wine, or 1½ ounces of liquor  · Get regular exercise  Exercise can help keep your blood sugar level steady, decrease your risk of heart disease, and help you lose weight  Exercise for at least 30 minutes, 5 days a week  Include muscle strengthening activities 2 days each week  Work with your healthcare provider to create an exercise plan  · Check your feet each day  for injuries or open sores  Ask your healthcare provider for activities you can do if you have an open sore  · Quit smoking  If you smoke, it is never too late to quit  Smoking can worsen the problems that may occur with diabetes  Ask your healthcare provider for information about how to stop smoking if you are having trouble quitting  · Ask about your weight:  Ask healthcare providers if you need to lose weight, and how much to lose  Ask them to help you with a weight loss program  Even a 10 to 15 pound weight loss can help you manage your blood sugar level  · Carry medical alert identification  Wear medical alert jewelry or carry a card that says you have diabetes  Ask your healthcare provider where to get these items  · Ask about vaccines  Diabetes puts you at risk of serious illness if you get the flu, pneumonia, or hepatitis   Ask your healthcare provider if you should get a flu, pneumonia, or hepatitis B vaccine, and when to get the vaccine  Follow up with your healthcare provider as directed:  Write down your questions so you remember to ask them during your visits  CARE AGREEMENT:   You have the right to help plan your care  Learn about your health condition and how it may be treated  Discuss treatment options with your caregivers to decide what care you want to receive  You always have the right to refuse treatment  The above information is an  only  It is not intended as medical advice for individual conditions or treatments  Talk to your doctor, nurse or pharmacist before following any medical regimen to see if it is safe and effective for you  © 2014 [address] is for End User's use only and may not be sold, redistributed or otherwise used for commercial purposes  All illustrations and images included in CareNotes® are the copyrighted property of A D A M , Inc  or James Hoyt

## 2022-03-18 ENCOUNTER — TELEPHONE (OUTPATIENT)
Dept: NEPHROLOGY | Facility: CLINIC | Age: 87
End: 2022-03-18

## 2022-03-30 NOTE — PLAN OF CARE
ARTIFICIAL TEARS to affected eye(s) as needed. Problem: OCCUPATIONAL THERAPY ADULT  Goal: Performs self-care activities at highest level of function for planned discharge setting  See evaluation for individualized goals  Treatment Interventions: ADL retraining, Functional transfer training, UE strengthening/ROM, Endurance training          See flowsheet documentation for full assessment, interventions and recommendations  Outcome: Progressing  Limitation: Decreased ADL status, Decreased UE strength, Decreased Safe judgement during ADL, Decreased endurance, Decreased fine motor control, Decreased self-care trans  Prognosis: Good  Assessment: Pt supine upon therapist's entry to room  Pt alert and cooperative, agreeable to skilled OT c focus on improving mobility and ADL skills  Pt presents deficits c strength and motor planning  Supine > sit c Mod A  Pt performed ADL at EOB  Pt required A +2 while seated due to  decreased sitting balance 2" to L lateral lean  Pt required set up for UB/LB ADLs c prompts to initiation and  thoroughness  Max A for LB bathing c buttocks and B lower leg/feet  Max A +2 sit > stand c cues for balance at Rw  Pt able to don gown overhead c A for orientation of clothing and cue to thread UEs   During functional txfer bed> recliner, pt required vc's for hand placement on arm rest upon sitting and prompts/cues for initiate movement of R LE   Deficits in cog level require extended time for processing and need for multi-step directions to be repeated  Pt educated in [facility] throughout session, able to recall 2/3 THP  Pt educated in AE to maintain THP  EC educated provided to A pain management  Recommend con't c skilled OT services to facilitate return to PLOF  Recommendation: PT consult  OT Discharge Recommendation: Short Term Rehab  OT - OK to Discharge:  Yes

## 2022-04-25 ENCOUNTER — TELEPHONE (OUTPATIENT)
Dept: GASTROENTEROLOGY | Facility: CLINIC | Age: 87
End: 2022-04-25

## 2022-05-25 ENCOUNTER — OFFICE VISIT (OUTPATIENT)
Dept: INTERNAL MEDICINE CLINIC | Facility: CLINIC | Age: 87
End: 2022-05-25
Payer: MEDICARE

## 2022-05-25 VITALS
BODY MASS INDEX: 30.21 KG/M2 | DIASTOLIC BLOOD PRESSURE: 86 MMHG | HEART RATE: 60 BPM | RESPIRATION RATE: 18 BRPM | SYSTOLIC BLOOD PRESSURE: 142 MMHG | WEIGHT: 215.8 LBS | HEIGHT: 71 IN

## 2022-05-25 DIAGNOSIS — S39.012D STRAIN OF LUMBAR REGION, SUBSEQUENT ENCOUNTER: ICD-10-CM

## 2022-05-25 DIAGNOSIS — N18.32 TYPE 2 DIABETES MELLITUS WITH STAGE 3B CHRONIC KIDNEY DISEASE, WITHOUT LONG-TERM CURRENT USE OF INSULIN (HCC): Primary | ICD-10-CM

## 2022-05-25 DIAGNOSIS — E11.22 TYPE 2 DIABETES MELLITUS WITH STAGE 3B CHRONIC KIDNEY DISEASE, WITHOUT LONG-TERM CURRENT USE OF INSULIN (HCC): Primary | ICD-10-CM

## 2022-05-25 PROBLEM — S39.012A STRAIN OF LUMBAR REGION: Status: ACTIVE | Noted: 2022-05-25

## 2022-05-25 LAB
LEFT EYE DIABETIC RETINOPATHY: ABNORMAL
LEFT EYE IMAGE QUALITY: ABNORMAL
LEFT EYE MACULAR EDEMA: ABNORMAL
LEFT EYE OTHER RETINOPATHY: ABNORMAL
RIGHT EYE DIABETIC RETINOPATHY: ABNORMAL
RIGHT EYE IMAGE QUALITY: ABNORMAL
RIGHT EYE MACULAR EDEMA: ABNORMAL
RIGHT EYE OTHER RETINOPATHY: ABNORMAL
SEVERITY (EYE EXAM): ABNORMAL

## 2022-05-25 PROCEDURE — 92250 FUNDUS PHOTOGRAPHY W/I&R: CPT | Performed by: NURSE PRACTITIONER

## 2022-05-25 PROCEDURE — 99214 OFFICE O/P EST MOD 30 MIN: CPT | Performed by: NURSE PRACTITIONER

## 2022-05-25 RX ORDER — ACETAMINOPHEN 500 MG
500 TABLET ORAL EVERY 6 HOURS PRN
COMMUNITY

## 2022-05-25 NOTE — ASSESSMENT & PLAN NOTE
Lab Results   Component Value Date    HGBA1C 6 3 (H) 03/01/2022     The patient had a hemoglobin a 1 C performed on March 1st which shows good control of his diabetes  He does have an upcoming appointment in 2 months with his primary care provider in this office  A diabetic eye exam was performed in the office today

## 2022-05-25 NOTE — PROGRESS NOTES
[place]    NAME: Mohini Alcaraz  AGE: 82 y o  SEX: male  : 1935     DATE: 2022     Assessment and Plan:     Problem List Items Addressed This Visit        Endocrine    Type 2 diabetes mellitus with stage 3b chronic kidney disease, without long-term current use of insulin ([address] ) - Primary (Chronic)       Lab Results   Component Value Date    HGBA1C 6 3 (H) 2022     The patient had a hemoglobin a 1 C performed on  which shows good control of his diabetes  He does have an upcoming appointment in 2 months with his primary care provider in this office  A diabetic eye exam was performed in the office today  Relevant Orders    IRIS Diabetic eye exam       Musculoskeletal and Integument    Strain of lumbar region       The patient presents to the office today accompanied by his daughter for an ongoing issue of bilateral lumbar pain  He states this pain has been ongoing for months  He has seen both a chiropractor and most recently his orthopedic provider at [facility] who diagnosed him with a muscle strain  Physical therapy was recommended however they did not order the physical therapy  The patient does have a GFR of 32 and should refrain from NSAID use at this time  He has been taking regular strength Tylenol for his discomfort without resolution  I did recommend to the patient that he see his physical therapist for this lumbar back pain and he does have an appointment tomorrow for physical therapy for his knees  In addition heat or ice was recommended  Tylenol Arthritis  650 mg 2 tablets every 8 hours was recommended for the patient  Lidocaine gel or lidocaine patches can be purchased over-the-counter  The patient  Can also try either Voltaren cream or Aleve gel to see if this helps his symptoms short term  Relevant Orders    Ambulatory Referral to Physical Therapy          BMI Counseling:  Body mass index is 30 53 kg/m²  Follow-up plan was not completed due to patient being in urgent or emergent medical situation  No follow-ups on file  Chief Complaint:     Chief Complaint   Patient presents with    Follow-up     LOWER BACK PAIN- kidney area hard to walk and cant bed over  History of Present Illness:     Patient presents to the office today for a several month history of lower back pain  He presents to the office with his daughter  Upon questioning the patient he recently was evaluated by his orthopedic doctor several days ago made the patient aware that 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 and this pain was either due to his arthritis or a muscle strain  The patient is unable to take NSAIDs due to his chronic kidney disease  This is discussed above in the assessment and plan  I have placed an order for physical therapy for his back  Recommendations for treatment were discussed with the patient and his daughter  Review of Systems:     Review of Systems   Constitutional: Negative  Negative for fatigue  HENT: Negative  Negative for congestion, postnasal drip, rhinorrhea and trouble swallowing  Eyes: Negative  Negative for visual disturbance  Respiratory: Negative  Negative for choking and shortness of breath  Cardiovascular: Negative  Negative for chest pain  Gastrointestinal: Negative  Endocrine: Negative  Genitourinary: Negative  Musculoskeletal: Positive for back pain  Negative for arthralgias, myalgias and neck pain  Skin: Negative  Neurological: Negative for dizziness and headaches  Psychiatric/Behavioral: Negative           Problem List:     Patient Active Problem List   Diagnosis    Hypertensive kidney disease with stage 3b chronic kidney disease [facility]    Mixed hyperlipidemia due to type 2 diabetes mellitus ([facility] )    CAD (coronary artery disease)    Stage 3b chronic kidney disease ([facility] )    History of CVA (cerebrovascular accident)    Paroxysmal atrial fibrillation (HCC)    Type 2 diabetes mellitus with stage 3b chronic kidney disease, without long-term current use of insulin (HCC)    Ambulatory dysfunction    Intermittent confusion    Other proteinuria    Vitamin D deficiency        Objective:     /80 (BP Location: Left arm, Patient Position: Sitting, Cuff Size: Standard)   Pulse 60   Resp 18   Ht 5' 10 5" (1 791 m)   Wt 97 9 kg (215 lb 12 8 oz)   BMI 30 53 kg/m²     Current Outpatient Medications   Medication Instructions    acetaminophen (TYLENOL) 500 mg, Oral, Every 6 hours PRN    amLODIPine (NORVASC) 10 mg, Oral, Daily    apixaban (ELIQUIS) 2 5 mg, Oral, 2 times daily    aspirin (ECOTRIN LOW STRENGTH) 81 mg, Oral, Daily    Blood Glucose Monitoring Suppl (ONE TOUCH ULTRA MINI) w/Device KIT Does not apply, Daily    cholecalciferol (VITAMIN D3) 1,000 Units, Oral, Daily    gabapentin (NEURONTIN) 100 mg, Oral, 2 times daily    glimepiride (AMARYL) 1 mg, Oral, Daily with breakfast    glucose blood (OneTouch Ultra) test strip Check blood sugars once daily    isosorbide dinitrate (ISORDIL) 10 mg, Oral, 2 times daily    lisinopril (ZESTRIL) 20 mg, Oral, Daily    metoprolol tartrate (LOPRESSOR) 12 5 mg, Oral, Every 12 hours scheduled    Omega-3 Fatty Acids (FISH OIL CONCENTRATE PO) 1 tablet, Oral, Daily    vitamin B-12 (CYANOCOBALAMIN) 1,000 mcg, Oral, Daily         Physical Exam  Vitals reviewed  Constitutional:       Appearance: Normal appearance  HENT:      Head: Normocephalic and atraumatic  Nose: Nose normal       Mouth/Throat:      Mouth: Mucous membranes are moist    Eyes:      Extraocular Movements: Extraocular movements intact  Pupils: Pupils are equal, round, and reactive to light  Cardiovascular:      Rate and Rhythm: Normal rate and regular rhythm  Pulses: Normal pulses  Heart sounds: Murmur heard     Pulmonary:      Effort: Pulmonary effort is normal       Breath sounds: Normal breath sounds  Musculoskeletal:      Lumbar back: Tenderness present  Decreased range of motion  Back:    Skin:     General: Skin is warm  Neurological:      General: No focal deficit present  Mental Status: He is alert and oriented to person, place, and time  Psychiatric:         Mood and Affect: Mood normal          Behavior: Behavior normal          Thought Content:  Thought content normal          Judgment: Judgment normal          Romi Shankar, [address] [place] [address]

## 2022-05-25 NOTE — PATIENT INSTRUCTIONS
Tylenol arthritis strength every 8 hours  Heat or ice   Voltaren cream or aleve gel to back  Lidocaine patch to back      Muscle Strain   WHAT YOU NEED TO KNOW:   A muscle strain is a twist, pull, or tear of a muscle or tendon  A tendon is a strong elastic tissue that connects a muscle to a bone  Signs of a strained muscle include bruising and swelling over the area, pain with movement, and loss of strength  DISCHARGE INSTRUCTIONS:   Return to the emergency department if:   You suddenly cannot feel or move your injured muscle  Call your doctor if:   Your pain and swelling worsen or do not go away  You have questions or concerns about your condition or care  Medicines: You may need any of the following:  NSAIDs  help decrease swelling and pain or fever  This medicine is available with or without a doctor's order  NSAIDs can cause stomach bleeding or kidney problems in certain people  If you take blood thinner medicine, always ask your healthcare provider if NSAIDs are safe for you  Always read the medicine label and follow directions  Muscle relaxers  help decrease pain and muscle spasms  Take your medicine as directed  Contact your healthcare provider if you think your medicine is not helping or if you have side effects  Tell him of her if you are allergic to any medicine  Keep a list of the medicines, vitamins, and herbs you take  Include the amounts, and when and why you take them  Bring the list or the pill bottles to follow-up visits  Carry your medicine list with you in case of an emergency  Help a muscle strain heal:   3 to 7 days after the injury:  Use Rest, Ice, Compression, and Elevation (RICE) to help stop bruising and decrease pain and swelling  Rest your muscle to allow the injury to heal   When the pain decreases, begin normal, slow movements  For mild and moderate muscle strains, you should rest your muscles for about 2 days   If you have a severe muscle strain, you should rest for 10 to 14 days  You may need to use crutches to walk if your muscle strain is in your legs or lower body  Apply ice on the injured area  Use an ice pack, or put crushed ice in a plastic bag  Cover the bag with a towel before you apply it to your skin  Apply ice for 15 to 20 minutes each hour, or as directed  Use compression to decrease swelling  You can wrap an elastic bandage around the area to create compression  The bandage should be tight enough for you to feel support  Do not wrap it too tightly  Elevate the area above the level of your heart, if possible  Keep the injured muscle raised above your heart if possible  For example, if you have a strain of your lower leg muscle, lie down and prop your leg up on pillows  This helps decrease pain and swelling  3 to 21 days after your injury:  Start to slowly and regularly exercise your strained muscle  This will help it heal  If you feel pain, decrease how hard you are exercising  1 to 6 weeks after your injury:  Stretch the injured muscle  Stretch the muscle for about 30 seconds  Do this 4 times a day  You may stretch the muscle until you feel a slight pull  Stop stretching if you feel pain  2 weeks to 6 months after your injury:  The goal of this phase is to return to the activity you were doing before the injury without hurting the muscle again  3 weeks to 6 months after your injury:  Keep stretching and strengthening your muscles to prevent injury  Slowly increase the time and distance that you exercise  You may still have signs and symptoms of muscle strain 6 months after the injury, even if you do things to help it heal  In this case, you may need surgery on the muscle  Prevent muscle strains:   Always wear proper shoes when you play sports  Replace your old running shoes with new ones often if you are a runner  Use special shoe inserts or arch supports to correct leg or foot problems   Ask your healthcare provider for more information on shoe supports  Do warm up and cool down exercises  Do stretching exercises before you work out or do sports activities  These exercises will help loosen and decrease stress on your muscles  Cool down and stretch after your workout  Do not stop and rest after a workout without cooling down first          Keep your muscles strong with strength training exercises  Exercises such as weight lifting and stretching exercises help keep your muscles flexible and strong  A physical therapist or  may help you with these exercises  Slowly start your exercise or sports training program   Follow your healthcare provider's advice on when to start exercising  Slowly increase time, distance, and how often you train  Sudden increases in how often you train may cause you to injure your muscle again  Follow up with your doctor as directed: Your doctor may suggest that you have a follow-up visit before you go back to your usual activity  Write down your questions so you remember to ask them during your visits  © [employer] 2022 Information is for End User's use only and may not be sold, redistributed or otherwise used for commercial purposes  All illustrations and images included in CareNotes® are the copyrighted property of [employer]  or [facility] Graciela Lazo   The above information is an  only  It is not intended as medical advice for individual conditions or treatments  Talk to your doctor, nurse or pharmacist before following any medical regimen to see if it is safe and effective for you  Arthritis   AMBULATORY CARE:   Arthritis  is pain or disease in one or more joints  There are many types of arthritis  Types such as rheumatoid arthritis cause inflammation in the joints  Other types wear away the cartilage between joints, such as osteoarthritis  This makes the bones of the joint rub together when you move the joint   An infection from bacteria, a virus, or a fungus can also cause arthritis  Your symptoms may be constant, or symptoms may come and go  Arthritis often gets worse over time and can cause permanent joint damage  Common signs and symptoms of arthritis:   Pain, swelling, or stiffness in the joint    Limited range of motion in the joint    Warmth or redness over the joint    Tenderness when you touch the joint    Stiff joints in the morning that loosen with movement    A creaking or grinding sound when you move the joint    Fever    Call your doctor or rheumatologist if:   You have a fever and severe joint pain or swelling  You cannot move the affected joint  You have severe joint pain you cannot tolerate  You have a new or worsening rash  Your pain or swelling does not get better with treatment  You have questions or concerns about your condition or care  Treatment  will depend on the type of arthritis you have and if it is severe  You may need any of the following:  Acetaminophen  decreases pain and fever  It is available without a doctor's order  Ask how much to take and how often to take it  Follow directions  Read the labels of all other medicines you are using to see if they also contain acetaminophen, or ask your doctor or pharmacist  Acetaminophen can cause liver damage if not taken correctly  Do not use more than 4 grams (4,000 milligrams) total of acetaminophen in one day  NSAIDs , such as ibuprofen, help decrease swelling, pain, and fever  This medicine is available with or without a doctor's order  NSAIDs can cause stomach bleeding or kidney problems in certain people  If you take blood thinner medicine, always ask your healthcare provider if NSAIDs are safe for you  Always read the medicine label and follow directions  Steroid medicine  helps reduce swelling and pain  Prescription pain medicine  may be given  Ask your healthcare provider how to take this medicine safely   Some prescription pain medicines contain acetaminophen  Do not take other medicines that contain acetaminophen without talking to your healthcare provider  Too much acetaminophen may cause liver damage  Prescription pain medicine may cause constipation  Ask your healthcare provider how to prevent or treat constipation  Surgery  may be needed to repair or replace a damaged joint  Manage your symptoms:   Rest your painful joint so it can heal   Your healthcare provider may recommend crutches or a walker if the affected joint is in a leg  Apply ice or heat to the joint  Both can help decrease swelling and pain  Ice may also help prevent tissue damage  Use an ice pack, or put crushed ice in a plastic bag  Cover it with a towel and place it on your joint for 15 to 20 minutes every hour or as directed  You can apply heat for 20 minutes every 2 hours  Heat treatment includes hot packs or heat lamps  Elevate your joint  Elevation helps reduce swelling and pain  Raise your joint above the level of your heart as often as you can  Prop your painful joint on pillows to keep it above your heart comfortably  Manage arthritis:   Talk to your healthcare providers about your arthritis medicines  Some medicines may only be needed when you have arthritis pain  You may need to take other medicines every day to prevent arthritis from getting worse  Your healthcare providers will help you understand all your medicines and when to take them  It is important to take the medicines as directed, even if you start to feel better  You can continue to have joint damage and inflammation even if you do not feel it  Eat a variety of healthy foods  Healthy foods include fruits, vegetables, whole-grain breads, low-fat dairy products, beans, lean meats, and fish  Ask if you need to be on a special diet  A diet rich in calcium and vitamin D may decrease your risk of osteoporosis  Foods high in calcium include milk, cheese, broccoli, and tofu   Vitamin D may be found in meat, fish, fortified milk, cereal and bread  Ask if you need calcium or vitamin D supplements  Go to physical or occupational therapy as directed  A physical therapist can teach you exercises to improve flexibility and range of motion  You may also be shown non-weight-bearing exercises that are safe for your joints, such as swimming  Exercise can help keep your joints flexible and reduce pain  An occupational therapist can help you learn to do your daily activities when your joints are stiff or sore  Maintain a healthy weight  Extra weight puts increased pressure on your joints  Ask your healthcare provider what you should weigh  If you need to lose weight, he or she can help you create a weight loss program  Weight loss can help reduce pain and increase your ability to do your activities  Wear flat or low-heeled shoes  This will help decrease pain and reduce pressure on your ankle, knee, and hip joints  Do not smoke  Nicotine and other chemicals in cigarettes and cigars can damage your bones and joints  Ask your healthcare provider for information if you currently smoke and need help to quit  E-cigarettes or smokeless tobacco still contain nicotine  Talk to your healthcare provider before you use these products  Support devices:   Orthotic shoes or insoles  help support your feet when you walk  Crutches, a cane, or a walker  may help decrease your risk for falling  They also decrease stress on affected joints  Devices to prevent falls  include raised toilet seats and bathtub bars to help you get up from sitting  Handrails can be placed in areas where you need balance and support  Devices to help with support and rest  include splints to wear on your hands and a firm pillow while you sleep  Use a pillow that is firm enough to support your neck and head      Follow up with your healthcare provider or rheumatologist as directed:  Write down your questions so you remember to ask them during your visits  © Copyright [employer] 2022 Information is for End User's use only and may not be sold, redistributed or otherwise used for commercial purposes  All illustrations and images included in CareNotes® are the copyrighted property of A D A M , Inc  or Regan Lazo   The above information is an  only  It is not intended as medical advice for individual conditions or treatments  Talk to your doctor, nurse or pharmacist before following any medical regimen to see if it is safe and effective for you  DASH Eating Plan   WHAT YOU NEED TO KNOW:   The DASH (Dietary Approaches to Stop Hypertension) Eating Plan is designed to help prevent or lower high blood pressure  It can also help to lower LDL (bad) cholesterol and decrease your risk for heart disease  The plan is low in sodium, sugar, unhealthy fats, and total fat  It is high in potassium, calcium, magnesium, and fiber  These nutrients are added when you eat more fruits, vegetables, and whole grains  With the DASH eating plan, you need to eat a certain number of servings from each food group  This will help you get enough of certain nutrients and limit others  The amount of servings you should eat depends on how many calories you need  Your dietitian can help you create meal plans with the right number of servings for each food group  DISCHARGE INSTRUCTIONS:   What you need to know about sodium:  Your dietitian will tell you how much sodium is safe for you to have each day  People with high blood pressure should have no more than 1,500 to 2,300 mg of sodium in a day  A teaspoon (tsp) of salt has 2,300 mg of sodium  This may seem like a difficult goal, but small changes to the foods you eat can make a big difference  Your healthcare provider or dietitian can help you create a meal plan that follows your sodium limit  Read food labels  Food labels can help you choose foods that are low in sodium   The amount of sodium is listed in milligrams (mg)  The % Daily Value (DV) column tells you how much of your daily needs are met by 1 serving of the food for each nutrient listed  Choose foods that have less than 5% of the DV of sodium  These foods are considered low in sodium  Foods that have 20% or more of the DV of sodium are considered high in sodium  Avoid foods that have more than 300 mg of sodium in each serving  Choose foods that say low-sodium, reduced-sodium, or no salt added on the food label  Limit added salt  Do not salt food at the table if you add salt when you cook  Use herbs and spices, such as onions, garlic, and salt-free seasonings to add flavor  Try lemon or lime juice or vinegar to add a tart flavor  Use hot peppers or a small amount of hot pepper sauce to add a spicy flavor  Limit foods high in added salt, such as the following:    Seasonings made with salt, such as garlic salt, celery salt, onion salt, seasoned salt, meat tenderizers, and monosodium glutamate (MSG)    Miso soup and canned or dried soup mixes    Regular soy sauce, barbecue sauce, teriyaki sauce, steak sauce, Worcestershire sauce, and most flavored vinegars    Snack foods, such as salted chips, popcorn, pretzels, pork rinds, salted crackers, and salted nuts    Frozen foods, such as dinners, entrees, vegetables with sauces, and breaded meats    Ask about salt substitutes  Ask your healthcare provider if you may use salt substitutes  Some salt substitutes have ingredients that can be harmful if you have certain health conditions  Choose foods carefully at restaurants  Meals from restaurants, especially fast food restaurants, are often high in sodium  Some restaurants have nutrition information that tells you the amount of sodium in their foods  Ask to have your food prepared with less, or no salt  What you need to know about fats:  Healthy fats include unsaturated fats and omega-3 fatty acids   Unhealthy fats include saturated fats and trans fats  Include healthy fats, such as the following:      Cooking oils, such as soybean, canola, olive, or sunflower    Fatty fish, such as salmon, tuna, mackerel, or sardines    Flaxseed oil or ground flaxseed    ½ cup of cooked beans, such as black beans, kidney beans, or lyle beans    1½ ounces of low-sodium nuts, such as almonds or walnuts    Low-sugar, low-sodium peanut butter    Seeds such as jim seeds or sunflower seeds       Limit or do not have unhealthy fats, such as the following:      Foods that contain fat from animals, such as fatty meats, whole milk, butter, and cream    Shortening, stick margarine, palm oil, and coconut oil    Full-fat or creamy salad dressing    Creamy soup    Crackers, chips, and baked goods made with margarine or shortening    Foods that are fried in unhealthy fats    Gravy and sauces, such as Anmol or cheese sauces    What you need to know about carbohydrates (carbs): All carbs break down into sugar  Complex carbs contain more fiber than simple carbs  This means complex carbs go into the bloodstream more slowly and cause less of a blood sugar spike  Try to include more complex carbs and fewer simple carbs  Include complex carbs, such as the followin slice of whole-grain bread    1 ounce of dry cereal that does not contain added sugar    ½ cup of cooked oatmeal    2 ounces of cooked whole-grain pasta    ½ cup of cooked brown rice    Limit or do not have simple carbs, such as the following:      [employer], such as doughnuts, pastries, and cookies    Mixes for cornbread and biscuits    White rice and pasta mixes, such as boxed macaroni and cheese    Instant and cold cereals that contain sugar    Jelly, jam, and ice cream that contain sugar    Condiments such as ketchup    Drinks high in sugar, such as soft drinks, lemonade, and fruit juice    What you need to know about vegetables and fruits:  Vegetables and fruits can be fresh, frozen, or canned   If possible, try to choose low-sodium canned options  Include a variety of vegetables and fruits, such as the followin medium apple, pear, or peach (about ½ cup chopped)    ½ small banana    ½ cup berries, such as blueberries, strawberries, or blackberries    1 cup of raw leafy greens, such as lettuce, spinach, kale, or sang greens    ½ cup of frozen or canned (no added salt) vegetables, such as green beans    ½ cup of fresh, frozen, or canned fruit (canned in light syrup or fruit juice)    ½ cup of vegetable or fruit juice    Limit or do not have vegetables and fruits made in the following ways:      Frozen fruit such as cherries that have added sugar    Fruit in cream or butter sauce    Canned vegetables that are high in sodium    Sauerkraut, pickled vegetables, and other foods prepared in brine    Fried vegetables or vegetables in butter or high-fat sauces    What you need to know about protein foods: Include lean or low-fat protein foods, such as the following:      Poultry (chicken, turkey) with no skin    Fish (especially fatty fish, such as salmon, fresh tuna, or mackerel)    Lean beef and pork (loin, round, extra lean hamburger)    Egg whites and egg substitutes    1 cup of nonfat (skim) or 1% milk    1½ ounces of fat-free or low-fat cheese    6 ounces of nonfat or low-fat yogurt    Limit or do not have high-fat protein foods, such as the following:      Smoked or cured meat, such as corned beef, corral, ham, hot dogs, and sausage    Canned beans and canned meats or spreads, such as potted meats, sardines, anchovies, and imitation seafood    Deli or lunch meats, such as bologna, ham, turkey, and roast beef    High-fat meat (T-bone steak, regular hamburger, and ribs)    Whole eggs and egg yolks    Whole milk, 2% milk, and cream    Regular cheese and processed cheese    Other guidelines to follow:   Maintain a healthy weight  Your risk for heart disease is higher if you are overweight   Your healthcare provider may suggest that you lose weight if you are overweight  You can lose weight by eating fewer calories and foods that have added sugars and fat  The DASH meal plan can help you do this  Decrease calories by eating smaller portions at each meal and fewer snacks  Ask your healthcare provider for more information about how to lose weight  Exercise regularly  Regular exercise can help you reach or maintain a healthy weight  Regular exercise can also help decrease your blood pressure and improve your cholesterol levels  Get 30 minutes or more of moderate exercise each day of the week  To lose weight, get at least 60 minutes of exercise  Talk to your healthcare provider about the best exercise program for you  Limit alcohol  Women should limit alcohol to 1 drink a day  Men should limit alcohol to 2 drinks a day  A drink of alcohol is 12 ounces of beer, 5 ounces of wine, or 1½ ounces of liquor  For more information:   [facility] and [address]  [address] [address]  [place] [address]  Phone: [phone] [phone]  Web Address: [facility] no    © [address] [facility] 2022 Information is for End User's use only and may not be sold, redistributed or otherwise used for commercial purposes  All illustrations and images included in CareNotes® are the copyrighted property of A D A M , Inc  or [address] [place], [place] [address]   The above information is an  only  It is not intended as medical advice for individual conditions or treatments  Talk to your doctor, nurse or pharmacist before following any medical regimen to see if it is safe and effective for you  Hypertension   WHAT YOU NEED TO KNOW:   Hypertension is high blood pressure  Your blood pressure is the force of your blood moving against the walls of your arteries   Hypertension causes your blood pressure to get so high that your heart has to work much harder than normal  This can damage your heart  The cause of hypertension may not be known  This is called essential or primary hypertension  Hypertension caused by another medical condition, such as kidney disease, is called secondary hypertension  DISCHARGE INSTRUCTIONS:   Call your local emergency number (911 in the [address] [facility]) or have someone call if:   You have chest pain  You have any of the following signs of a heart attack:      Squeezing, pressure, or pain in your chest    You may  also have any of the following:     Discomfort or pain in your back, neck, jaw, stomach, or arm    Shortness of breath    Nausea or vomiting    Lightheadedness or a sudden cold sweat    You become confused or have trouble speaking  You suddenly feel lightheaded or have trouble breathing  Return to the emergency department if:   You have a severe headache or vision loss  You have weakness in an arm or leg  Call your doctor or cardiologist if:   You feel faint, dizzy, confused, or drowsy  You have been taking your blood pressure medicine but your pressure is higher than your provider says it should be  You have questions or concerns about your condition or care  Medicines: You may  need any of the following:  Antihypertensives  may be used to help lower your blood pressure  Several kinds of medicines are available  Your healthcare provider will choose medicines based on the kind of hypertension you have  You may need more than one type of medicine  Take the medicine exactly as directed  Diuretics  help decrease extra fluid that collects in your body  This will help lower your blood pressure  You may urinate more often while you take this medicine  Cholesterol medicine  helps lower your cholesterol level  A low cholesterol level helps prevent heart disease and makes it easier to control your blood pressure  Take your medicine as directed    Contact your healthcare provider if you think your medicine is not helping or if you have side effects  Tell him or her if you are allergic to any medicine  Keep a list of the medicines, vitamins, and herbs you take  Include the amounts, and when and why you take them  Bring the list or the pill bottles to follow-up visits  Carry your medicine list with you in case of an emergency  Follow up with your doctor or cardiologist as directed: You will need to return to have your blood pressure checked and to have other lab tests done  Write down your questions so you remember to ask them during your visits  Stages of hypertension:       Normal blood pressure is 119/79 or lower   Your healthcare provider may only check your blood pressure each year if it stays at a normal level  Elevated blood pressure is 120/79 to 129/79   This is sometimes called prehypertension  Your healthcare provider may suggest lifestyle changes to help lower your blood pressure to a normal level  He or she may then check it again in 3 to 6 months  Stage 1 hypertension is 130/80  to 139/89   Your provider may recommend lifestyle changes, medication, and checks every 3 to 6 months until your blood pressure is controlled  Stage 2 hypertension is 140/90 or higher   Your provider will recommend lifestyle changes and have you take 2 kinds of hypertension medicines  You will also need to have your blood pressure checked monthly until it is controlled  Manage hypertension:   Check your blood pressure at home  Avoid smoking, caffeine, and exercise at least 30 minutes before checking your blood pressure  Sit and rest for 5 minutes before you take your blood pressure  Extend your arm and support it on a flat surface  Your arm should be at the same level as your heart  Follow the directions that came with your blood pressure monitor  Check your blood pressure 2 times, 1 minute apart, before you take your medicine in the morning   Also check your blood pressure before your evening meal  Keep a record of your readings and bring it to your follow-up visits  Ask your healthcare provider what your blood pressure should be  Manage any other health conditions you have  Health conditions such as diabetes can increase your risk for hypertension  Follow your healthcare provider's instructions and take all your medicines as directed  Ask about all medicines  Certain medicines can increase your blood pressure  Examples include oral birth control pills, decongestants, herbal supplements, and NSAIDs, such as ibuprofen  Your healthcare provider can tell you which medicines are safe for you to take  This includes prescription and over-the-counter medicines  Lifestyle changes you can make to manage hypertension:  Your healthcare provider may recommend you work with a team to manage hypertension  The team may include medical experts such as a dietitian, an exercise or physical therapist, and a behavior therapist  Your family members may be included in helping you create lifestyle changes  Limit sodium (salt) as directed  Too much sodium can affect your fluid balance  Check labels to find low-sodium or no-salt-added foods  Some low-sodium foods use potassium salts for flavor  Too much potassium can also cause health problems  Your healthcare provider will tell you how much sodium and potassium are safe for you to have in a day  He or she may recommend that you limit sodium to 2,300 mg a day  Follow the meal plan recommended by your healthcare provider  A dietitian or your provider can give you more information on low-sodium plans or the DASH (Dietary Approaches to Stop Hypertension) eating plan  The DASH plan is low in sodium, processed sugar, unhealthy fats, and total fat  It is high in potassium, calcium, and fiber  These can be found in vegetables, fruit, and whole-grain foods  Be physically active throughout the day    Physical activity, such as exercise, can help control your blood pressure and your weight  Be physically active for at least 30 minutes per day, on most days of the week  Include aerobic activity, such as walking or riding a bicycle  Also include strength training at least 2 times each week  Your healthcare providers can help you create a physical activity plan  Decrease stress  This may help lower your blood pressure  Learn ways to relax, such as deep breathing or listening to music  Limit alcohol as directed  Alcohol can increase your blood pressure  A drink of alcohol is 12 ounces of beer, 5 ounces of wine, or 1½ ounces of liquor  Do not smoke  Nicotine and other chemicals in cigarettes and cigars can increase your blood pressure and also cause lung damage  Ask your healthcare provider for information if you currently smoke and need help to quit  E-cigarettes or smokeless tobacco still contain nicotine  Talk to your healthcare provider before you use these products  © Copyright [employer] 2022 Information is for End User's use only and may not be sold, redistributed or otherwise used for commercial purposes  All illustrations and images included in CareNotes® are the copyrighted property of [employer] Inc  or [employer]  The above information is an  only  It is not intended as medical advice for individual conditions or treatments  Talk to your doctor, nurse or pharmacist before following any medical regimen to see if it is safe and effective for you  10% - bad control"> 10% - bad control,Hemoglobin A1c (HbA1c) greater than 10% indicating poor diabetic control,Haemoglobin A1c greater than 10% indicating poor diabetic control">   Diabetes Mellitus Type 2 in Adults, Ambulatory Care   GENERAL INFORMATION:   Diabetes mellitus type 2  is a disease that affects how your body uses glucose (sugar)  Insulin helps move sugar out of the blood so it can be used for energy  Normally, when the blood sugar level increases, the pancreas makes more insulin  Type 2 diabetes develops because either the body cannot make enough insulin, or it cannot use the insulin correctly  After many years, your pancreas may stop making insulin  Common symptoms include the following:   More hunger or thirst than usual     Frequent urination     Weight loss without trying     Blurred vision  Seek immediate care for the following symptoms:   Severe abdominal pain, or pain that spreads to your back  You may also be vomiting  Trouble staying awake or focusing    Shaking or sweating    Blurred or double vision    Breath has a fruity, sweet smell    Breathing is deep and labored, or rapid and shallow    Heartbeat is fast and weak  Treatment for diabetes mellitus type 2  includes keeping your blood sugar at a normal level  You must eat the right foods, and exercise regularly  You may also need medicine if you cannot control your blood sugar level with nutrition and exercise  Manage diabetes mellitus type 2:   Check your blood sugar level  You will be taught how to check a small drop of blood in a glucose monitor  Ask your healthcare provider when and how often to check during the day  Ask your healthcare provider what your blood sugar levels should be when you check them  Keep track of carbohydrates (sugar and starchy foods)  Your blood sugar level can get too high if you eat too many carbohydrates  Your dietitian will help you plan meals and snacks that have the right amount of carbohydrates  Eat low-fat foods  Some examples are skinless chicken and low-fat milk  Eat less sodium (salt)  Some examples of high-sodium foods to limit are soy sauce, potato chips, and soup  Do not add salt to food you cook  Limit your use of table salt  Eat high-fiber foods  Foods that are a good source of fiber include vegetables, whole grain bread, and beans  Limit alcohol  Alcohol affects your blood sugar level and can make it harder to manage your diabetes   Women should limit alcohol to 1 drink a day  Men should limit alcohol to 2 drinks a day  A drink of alcohol is 12 ounces of beer, 5 ounces of wine, or 1½ ounces of liquor  Get regular exercise  Exercise can help keep your blood sugar level steady, decrease your risk of heart disease, and help you lose weight  Exercise for at least 30 minutes, 5 days a week  Include muscle strengthening activities 2 days each week  Work with your healthcare provider to create an exercise plan  Check your feet each day  for injuries or open sores  Ask your healthcare provider for activities you can do if you have an open sore  Quit smoking  If you smoke, it is never too late to quit  Smoking can worsen the problems that may occur with diabetes  Ask your healthcare provider for information about how to stop smoking if you are having trouble quitting  Ask about your weight:  Ask healthcare providers if you need to lose weight, and how much to lose  Ask them to help you with a weight loss program  Even a 10 to 15 pound weight loss can help you manage your blood sugar level  Carry medical alert identification  Wear medical alert jewelry or carry a card that says you have diabetes  Ask your healthcare provider where to get these items  Ask about vaccines  Diabetes puts you at risk of serious illness if you get the flu, pneumonia, or hepatitis  Ask your healthcare provider if you should get a flu, pneumonia, or hepatitis B vaccine, and when to get the vaccine  Follow up with your healthcare provider as directed:  Write down your questions so you remember to ask them during your visits  CARE AGREEMENT:   You have the right to help plan your care  Learn about your health condition and how it may be treated  Discuss treatment options with your caregivers to decide what care you want to receive  You always have the right to refuse treatment  The above information is an  only   It is not intended as medical advice for individual conditions or treatments  Talk to your doctor, nurse or pharmacist before following any medical regimen to see if it is safe and effective for you  © 2014 [address] is for End User's use only and may not be sold, redistributed or otherwise used for commercial purposes  All illustrations and images included in CareNotes® are the copyrighted property of [employer]  or [employer]  Acute Low Back Pain   AMBULATORY CARE:   Acute low back pain  is sudden discomfort that lasts up to 6 weeks and makes activity difficult  Common symptoms include the following:   Back stiffness or spasms    Pain down the back or side of one leg    Holding yourself in an unusual position or posture to decrease your back pain    Not being able to find a sitting position that is comfortable    Slow increase in your pain for 24 to 48 hours after you stress your back    Tenderness on your lower back or severe pain when you move your back    Seek care immediately if:   You have severe pain  You have sudden stiffness and heaviness on both buttocks down to both legs  You have numbness or weakness in one leg, or pain in both legs  You have numbness in your genital area or across your lower back  You cannot control your urine or bowel movements  Call your doctor if:   You have a fever  You have pain at night or when you rest     Your pain does not get better with treatment  You have pain that worsens when you cough or sneeze  You suddenly feel something pop or snap in your back  You have questions or concerns about your condition or care  The goal of treatment for acute low back pain  is to relieve your pain and help you be able to do your regular activities  Most people with acute lower back pain get better within 4 to 6 weeks  You may need any of the following:  NSAIDs , such as ibuprofen, help decrease swelling, pain, and fever   This medicine is available with or without a doctor's order  NSAIDs can cause stomach bleeding or kidney problems in certain people  If you take blood thinner medicine, always ask your healthcare provider if NSAIDs are safe for you  Always read the medicine label and follow directions  Acetaminophen  decreases pain and fever  It is available without a doctor's order  Ask how much to take and how often to take it  Follow directions  Read the labels of all other medicines you are using to see if they also contain acetaminophen, or ask your doctor or pharmacist  Acetaminophen can cause liver damage if not taken correctly  Do not use more than 4 grams (4,000 milligrams) total of acetaminophen in one day  Muscle relaxers  decrease pain by relaxing the muscles in your lower spine  Prescription pain medicine  may be given  Ask your healthcare provider how to take this medicine safely  Some prescription pain medicines contain acetaminophen  Do not take other medicines that contain acetaminophen without talking to your healthcare provider  Too much acetaminophen may cause liver damage  Prescription pain medicine may cause constipation  Ask your healthcare provider how to prevent or treat constipation  Manage your symptoms:   Stay active  as much as you can without causing more pain  Bed rest could make your back pain worse  Start with some light exercises such as walking  Avoid heavy lifting until your pain is gone  Ask for more information about the activities or exercises that are right for you  Apply heat  on your back for 20 to 30 minutes every 2 hours for as many days as directed  Heat helps decrease pain and muscle spasms  Alternate heat and ice  Apply ice  on your back for 15 to 20 minutes every hour or as directed  Use an ice pack, or put crushed ice in a plastic bag  Cover it with a towel before you apply it to your skin  Ice helps prevent tissue damage and decreases swelling and pain      Prevent acute low back pain:   Use proper body mechanics  Bend at the hips and knees when you  objects  Do not bend from the waist  Use your leg muscles as you lift the load  Do not use your back  Keep the object close to your chest as you lift it  Try not to twist or lift anything above your waist          Change your position often when you stand for long periods of time  Rest one foot on a small box or footrest, and then switch to the other foot often  Try not to sit for long periods of time  When you do, sit in a straight-backed chair with your feet flat on the floor  Never reach, pull, or push while you are sitting  Do exercises that strengthen your back muscles  Warm up before you exercise  Ask your healthcare provider the best exercises for you  Maintain a healthy weight  Ask your healthcare provider what a healthy weight is for you  Ask him or her to help you create a weight loss plan if you are overweight  Follow up with your doctor as directed:  Return for a follow-up visit if you still have pain after 1 to 3 weeks of treatment  You may need to visit an orthopedist if your back pain lasts longer than 12 weeks  Write down your questions so you remember to ask them during your visits  © Copyright [facility] 2022 Information is for End User's use only and may not be sold, redistributed or otherwise used for commercial purposes  All illustrations and images included in CareNotes® are the copyrighted property of [employer]  or [employer]an [employer]  The above information is an  only  It is not intended as medical advice for individual conditions or treatments  Talk to your doctor, nurse or pharmacist before following any medical regimen to see if it is safe and effective for you

## 2022-05-25 NOTE — ASSESSMENT & PLAN NOTE
The patient presents to the office today accompanied by his daughter for an ongoing issue of bilateral lumbar pain  He states this pain has been ongoing for months  He has seen both a chiropractor and most recently his orthopedic provider at [facility] who diagnosed him with a muscle strain  Physical therapy was recommended however they did not order the physical therapy  The patient does have a GFR of 32 and should refrain from NSAID use at this time  He has been taking regular strength Tylenol for his discomfort without resolution  I did recommend to the patient that he see his physical therapist for this lumbar back pain and he does have an appointment tomorrow for physical therapy for his knees  In addition heat or ice was recommended  Tylenol Arthritis  650 mg 2 tablets every 8 hours was recommended for the patient  Lidocaine gel or lidocaine patches can be purchased over-the-counter  The patient  Can also try either Voltaren cream or Aleve gel to see if this helps his symptoms short term

## 2022-05-26 ENCOUNTER — TELEPHONE (OUTPATIENT)
Dept: INTERNAL MEDICINE CLINIC | Facility: CLINIC | Age: 87
End: 2022-05-26

## 2022-05-26 NOTE — TELEPHONE ENCOUNTER
----- Message from Alba Ayala, Jessica Timmonsia  sent at 5/26/2022  3:25 PM EDT -----  Please let the patient know his [facility] eye exam is abnormal  He needs to follow up with a retinal specialist within four weeks  If he does not have a provider he already sees, give him the number for [facility]

## 2022-05-31 ENCOUNTER — TELEPHONE (OUTPATIENT)
Dept: INTERNAL MEDICINE CLINIC | Facility: CLINIC | Age: 87
End: 2022-05-31

## 2022-05-31 ENCOUNTER — TELEPHONE (OUTPATIENT)
Dept: GERIATRICS | Age: 87
End: 2022-05-31

## 2022-05-31 NOTE — TELEPHONE ENCOUNTER
Not sure with the patient wants me to do with these  I do not have the ability to look at these images

## 2022-05-31 NOTE — TELEPHONE ENCOUNTER
Jenna Johnson dropping off xray images of hip/ back on a cd, put in debbie rubalcava's bin    Pt saw debbie rubalcava this past Thursday, please advise

## 2022-05-31 NOTE — TELEPHONE ENCOUNTER
Mayelin Solomon called back and I told her that Lashay Tam doesn't have a reader for the CD with the x-ray results on it  She's going to have her brother call Dr Meng's with [facility] office to have the hard copy Faxed to our office     She got our Fax#  It'll be Faxed to Dr Roshni Toledo

## 2022-05-31 NOTE — TELEPHONE ENCOUNTER
[address] [address]  [address] [facility] [place], [address]    [address] [facility],[address]  [place], [address] [place] [address] [place]    [phone]    Telephone Intake: Geriatric Assessment     Referral source: Zayda Longo DO    Caller who is scheduling/relationship to patient: Eusebio higgins phone number: [phone]    Reason for referral: Patient concerns , Family member concerns and Provider concerns regarding memory concerns and driving concerns  What is the goal of the visit? initial assessment and diagnosis; resources; medication management     Has the patient been seen by a Neurologist or Geriatrician? Yes, about 2 years ago, but didn't feel they were addressing the issues  Has the patient ever been diagnosed with dementia? No      Preferred language? English  Highest education level? High School Graduate  Does the patient wear glasses? Yes, readers   Does the patient use hearing aids? No     Is there a living will/healthcare POA in place/If so, who? Yes , Kirstin Montes    Does the pt/caregiver have access for a virtual visit (computer/smart phone with audio/video)? Yes     Caller was informed: Please make sure the pt is accompanied by someone who knows them well / caregiver / family member to participate in this appointment  Who will accompany the pt (name and relationship)? Mumtaz Patel  Phone number of person accompanying pt: [phone]  If a pt plans to attend alone, please route a message to the assigned provider for approval      Office packet mailed out to: [id number] [facility] [address] [facility] [place], [address] [place] [address]    Added to wait list for sooner appointments? Yes     NOTE FOR : If the pt was recently hospitalized, please route intake to assigned provider for chart review

## 2022-06-07 ENCOUNTER — TELEPHONE (OUTPATIENT)
Dept: NEPHROLOGY | Facility: CLINIC | Age: 87
End: 2022-06-07

## 2022-06-08 ENCOUNTER — APPOINTMENT (OUTPATIENT)
Dept: LAB | Facility: HOSPITAL | Age: 87
End: 2022-06-08
Payer: MEDICARE

## 2022-06-08 DIAGNOSIS — R80.8 OTHER PROTEINURIA: ICD-10-CM

## 2022-06-08 DIAGNOSIS — N18.32 STAGE 3B CHRONIC KIDNEY DISEASE (HCC): Chronic | ICD-10-CM

## 2022-06-08 DIAGNOSIS — N18.32 HYPERTENSIVE KIDNEY DISEASE WITH STAGE 3B CHRONIC KIDNEY DISEASE (HCC): ICD-10-CM

## 2022-06-08 DIAGNOSIS — E55.9 VITAMIN D DEFICIENCY: ICD-10-CM

## 2022-06-08 DIAGNOSIS — I12.9 HYPERTENSIVE KIDNEY DISEASE WITH STAGE 3B CHRONIC KIDNEY DISEASE (HCC): ICD-10-CM

## 2022-06-08 LAB
25(OH)D3 SERPL-MCNC: 41.9 NG/ML (ref 30–100)
ANION GAP SERPL CALCULATED.3IONS-SCNC: 10 MMOL/L (ref 4–13)
BACTERIA UR QL AUTO: ABNORMAL /HPF
BASOPHILS # BLD AUTO: 0.04 THOUSANDS/ΜL (ref 0–0.1)
BASOPHILS NFR BLD AUTO: 1 % (ref 0–1)
BILIRUB UR QL STRIP: NEGATIVE
BUN SERPL-MCNC: 34 MG/DL (ref 5–25)
CALCIUM SERPL-MCNC: 9 MG/DL (ref 8.3–10.1)
CHLORIDE SERPL-SCNC: 104 MMOL/L (ref 100–108)
CLARITY UR: CLEAR
CO2 SERPL-SCNC: 26 MMOL/L (ref 21–32)
COLOR UR: YELLOW
CREAT SERPL-MCNC: 1.99 MG/DL (ref 0.6–1.3)
CREAT UR-MCNC: 140 MG/DL
EOSINOPHIL # BLD AUTO: 0.38 THOUSAND/ΜL (ref 0–0.61)
EOSINOPHIL NFR BLD AUTO: 7 % (ref 0–6)
ERYTHROCYTE [DISTWIDTH] IN BLOOD BY AUTOMATED COUNT: 13 % (ref 11.6–15.1)
GFR SERPL CREATININE-BSD FRML MDRD: 29 ML/MIN/1.73SQ M
GLUCOSE P FAST SERPL-MCNC: 190 MG/DL (ref 65–99)
GLUCOSE UR STRIP-MCNC: NEGATIVE MG/DL
HCT VFR BLD AUTO: 39.5 % (ref 36.5–49.3)
HGB BLD-MCNC: 12.7 G/DL (ref 12–17)
HGB UR QL STRIP.AUTO: NEGATIVE
IMM GRANULOCYTES # BLD AUTO: 0.01 THOUSAND/UL (ref 0–0.2)
IMM GRANULOCYTES NFR BLD AUTO: 0 % (ref 0–2)
KETONES UR STRIP-MCNC: NEGATIVE MG/DL
LEUKOCYTE ESTERASE UR QL STRIP: NEGATIVE
LYMPHOCYTES # BLD AUTO: 1.57 THOUSANDS/ΜL (ref 0.6–4.47)
LYMPHOCYTES NFR BLD AUTO: 28 % (ref 14–44)
MCH RBC QN AUTO: 31 PG (ref 26.8–34.3)
MCHC RBC AUTO-ENTMCNC: 32.2 G/DL (ref 31.4–37.4)
MCV RBC AUTO: 96 FL (ref 82–98)
MICROALBUMIN UR-MCNC: 903 MG/L (ref 0–20)
MICROALBUMIN/CREAT 24H UR: 645 MG/G CREATININE (ref 0–30)
MONOCYTES # BLD AUTO: 0.44 THOUSAND/ΜL (ref 0.17–1.22)
MONOCYTES NFR BLD AUTO: 8 % (ref 4–12)
NEUTROPHILS # BLD AUTO: 3.25 THOUSANDS/ΜL (ref 1.85–7.62)
NEUTS SEG NFR BLD AUTO: 56 % (ref 43–75)
NITRITE UR QL STRIP: NEGATIVE
NON-SQ EPI CELLS URNS QL MICRO: ABNORMAL /HPF
NRBC BLD AUTO-RTO: 0 /100 WBCS
PH UR STRIP.AUTO: 5.5 [PH]
PHOSPHATE SERPL-MCNC: 3.9 MG/DL (ref 2.3–4.1)
PLATELET # BLD AUTO: 179 THOUSANDS/UL (ref 149–390)
PMV BLD AUTO: 9.8 FL (ref 8.9–12.7)
POTASSIUM SERPL-SCNC: 4.8 MMOL/L (ref 3.5–5.3)
PROT UR STRIP-MCNC: ABNORMAL MG/DL
PTH-INTACT SERPL-MCNC: 74.3 PG/ML (ref 18.4–80.1)
RBC # BLD AUTO: 4.1 MILLION/UL (ref 3.88–5.62)
RBC #/AREA URNS AUTO: ABNORMAL /HPF
SODIUM SERPL-SCNC: 140 MMOL/L (ref 136–145)
SP GR UR STRIP.AUTO: 1.02 (ref 1–1.03)
URATE SERPL-MCNC: 7.2 MG/DL (ref 4.2–8)
UROBILINOGEN UR QL STRIP.AUTO: 0.2 E.U./DL
WBC # BLD AUTO: 5.69 THOUSAND/UL (ref 4.31–10.16)
WBC #/AREA URNS AUTO: ABNORMAL /HPF

## 2022-06-08 PROCEDURE — 82043 UR ALBUMIN QUANTITATIVE: CPT

## 2022-06-08 PROCEDURE — 82570 ASSAY OF URINE CREATININE: CPT

## 2022-06-08 PROCEDURE — 82306 VITAMIN D 25 HYDROXY: CPT

## 2022-06-08 PROCEDURE — 81001 URINALYSIS AUTO W/SCOPE: CPT

## 2022-06-08 PROCEDURE — 84100 ASSAY OF PHOSPHORUS: CPT

## 2022-06-08 PROCEDURE — 83970 ASSAY OF PARATHORMONE: CPT

## 2022-06-08 PROCEDURE — 80048 BASIC METABOLIC PNL TOTAL CA: CPT

## 2022-06-08 PROCEDURE — 85025 COMPLETE CBC W/AUTO DIFF WBC: CPT

## 2022-06-08 PROCEDURE — 84550 ASSAY OF BLOOD/URIC ACID: CPT

## 2022-06-08 PROCEDURE — 36415 COLL VENOUS BLD VENIPUNCTURE: CPT

## 2022-06-14 ENCOUNTER — OFFICE VISIT (OUTPATIENT)
Dept: NEPHROLOGY | Facility: CLINIC | Age: 87
End: 2022-06-14
Payer: MEDICARE

## 2022-06-14 VITALS
SYSTOLIC BLOOD PRESSURE: 170 MMHG | DIASTOLIC BLOOD PRESSURE: 70 MMHG | RESPIRATION RATE: 16 BRPM | WEIGHT: 209.4 LBS | BODY MASS INDEX: 31.01 KG/M2 | TEMPERATURE: 96 F | HEIGHT: 69 IN | HEART RATE: 72 BPM | OXYGEN SATURATION: 98 %

## 2022-06-14 DIAGNOSIS — N18.4 STAGE 4 CHRONIC KIDNEY DISEASE (HCC): Primary | ICD-10-CM

## 2022-06-14 DIAGNOSIS — N18.4 HYPERTENSIVE KIDNEY DISEASE WITH STAGE 4 CHRONIC KIDNEY DISEASE (HCC): ICD-10-CM

## 2022-06-14 DIAGNOSIS — R80.8 OTHER PROTEINURIA: ICD-10-CM

## 2022-06-14 DIAGNOSIS — I12.9 HYPERTENSIVE KIDNEY DISEASE WITH STAGE 4 CHRONIC KIDNEY DISEASE (HCC): ICD-10-CM

## 2022-06-14 DIAGNOSIS — E55.9 VITAMIN D DEFICIENCY: ICD-10-CM

## 2022-06-14 PROCEDURE — 99214 OFFICE O/P EST MOD 30 MIN: CPT | Performed by: INTERNAL MEDICINE

## 2022-06-14 NOTE — PROGRESS NOTES
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  Patient's daughter-Barrie was also present at the time of consultation and history was also obtained from her and all the questions were answered  Upon review of old medical records patient's new baseline creatinine seems to be fluctuating around 1 9-2 1  Patient and his daughter has attended [facility] class in the interim and looking at overall clinical picture, patient would not be ideal candidate for home dialysis and would consider deferring patient to AV fistula creation once GFR goes below 20   According to patient's daughter, they would like to have family discussion before making any disease and but she is also leaning towards incenter hemodialysis  Patient has underlying coronary artery disease and had underwent cardiac catheterization on 1/30/2020 and had mid and distal LAD stent placed  Patient continued to have shortness of breath with minimal activity and currently been followed by cardiologist and was told that he will not get better in the future due to underlying heart condition  Patient has hypertension and has been compliant with his antihypertensive medications  According to patient he has been checking blood pressure on regular basis at home and his blood pressure at home is under well control    Patient had underwent renal artery Doppler in March 2020 which also showed > 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 and has refused to see vascular surgeon in the past   Patient does not have any abdominal symptoms either  Patient has underlying diabetes type 2 and according to patient's daughter, diabetes seems to be under good control at this point   Patient was also previously found to have proteinuria which was suspected due to underlying diabetic nephropathy on top of hypertensive nephrosclerosis  Patient takes lisinopril  Patient was also found to have vitamin-D deficiency and currently taking cholecalciferol 1000 Units PO daily  Patient also have back pain which was suspected due to arthritis and now been followed by physical therapist     Patient takes all the medications as prescribed and denies use of NSAIDs  REVIEW OF SYSTEMS:    Review of Systems   Constitutional: Negative for chills and fever  HENT: Negative for nosebleeds and sore throat  Eyes: Negative for photophobia and pain  Respiratory: Negative for choking and wheezing  Cardiovascular: Negative for chest pain and palpitations  Gastrointestinal: Negative for abdominal pain and blood in stool  Endocrine: Negative for heat intolerance  Genitourinary: Negative for dysuria and hematuria  Musculoskeletal: Positive for back pain  Negative for joint swelling and neck pain  Skin: Negative for color change and pallor  Neurological: Negative for seizures and syncope  Psychiatric/Behavioral: Negative for confusion and suicidal ideas           PAST MEDICAL HISTORY:  Past Medical History:   Diagnosis Date    Acute deep vein thrombosis (DVT) of brachial vein of left upper extremity ([facility] [address] ) 11/24/2017    Acute deep vein thrombosis (DVT) of left peroneal vein (HCC)     Acute ST elevation myocardial infarction [facility])     Aortic valve stenosis     Atrial fibrillation ([facility] [address] )     Basilar artery stenosis     Basilar artery stenosis     Basilar artery stenosis 5/4/2020    MRA Head wo contrast (12/13/2019)  IMPRESSION:   Multifocal intracranial atherosclerotic disease with moderate to severe stenosis at the origin of the left M1 segment with additional atherosclerotic disease noted in the distal right M1 and proximal inferior left M2 branches    Moderate to severe stenosis in the proximal and mid basilar artery with nonvisualization of the right intradural vertebral    Benign prostatic hyperplasia with lower urinary tract symptoms     CAD (coronary artery disease)     CAD in native artery 11/24/2017    Underwent cardiac catheterization on 1/30/2020 and had mid and distal LAD stent placed  Cardiac PCI (1/30/2020)  SUMMARY   CORONARY CIRCULATION: Mid LAD: There was a 90 % stenosis at the site of a prior stent  Distal LAD: There was a 90 % stenosis at the site of a prior stent  Left posterior descending artery: There was a diffuse 50 % stenosis    1ST LESION INTERVENTIONS: A balloon angioplasty wit    Cerebrovascular small vessel disease 11/12/2020    Chronic kidney disease     Closed fracture of multiple ribs of left side with routine healing 9/3/2020    DM2 (diabetes mellitus, type 2) ([facility] [address] )     Elevated troponin 7/19/2021    Herpes zoster without complication 2/97/5403    History of CVA (cerebrovascular accident) 2/21/2019    History of DVT of peroneal vein left lower extremity 12/16/2019    History of transfusion     HLD (hyperlipidemia)     HTN (hypertension)     Infected sebaceous cyst of skin 6/8/2021    Lump in chest 6/1/2021    Middle cerebral artery stenosis     Mild to moderate aortic stenosis 10/9/2018    ECHO (7/2020)  AORTIC VALVE: Leaflets exhibited moderately increased thickness and moderate calcification  Transaortic velocity was increased due to valvular stenosis  There was mild to moderate stenosis  RICHY 1 4cm, mean pressure gradient 11mmHg, peak velocity 2 15m/s There was mild regurgitation      Near syncope 7/19/2021    Other proteinuria 10/8/2019    Proteinuria     Rib fractures (right) 7/31/2020    Symptomatic bradycardia     Transient cerebral ischemia     Vitamin D deficiency PAST SURGICAL HISTORY:  Past Surgical History:   Procedure Laterality Date    CARDIAC CATHETERIZATION      Outcome: successful; last assessed: 02/03/2015    CARDIAC CATHETERIZATION  11/26/2019    CORONARY ANGIOPLASTY WITH STENT PLACEMENT  2008    stent to LAD     HAND SURGERY      thumb    HIP HARDWARE REMOVAL      TOTAL HIP ARTHROPLASTY Right     TOTAL HIP ARTHROPLASTY Bilateral        SOCIAL HISTORY:  Social History     Substance and Sexual Activity   Alcohol Use Never     Social History     Substance and Sexual Activity   Drug Use No     Social History     Tobacco Use   Smoking Status Never Smoker   Smokeless Tobacco Never Used       FAMILY HISTORY:  Family History   Problem Relation Age of Onset    Emphysema Father     Emphysema Sister        ALLERGY:  Allergies   Allergen Reactions    Celecoxib Other (See Comments)     Per pt does NOT remember type of reaction or severity level    Hydromorphone Other (See Comments)     Per pt does NOT remember type of reaction or severity level    Pravastatin Hives    Statins Other (See Comments)     Per pt does NOT remember type of reaction or severity level       MEDICATIONS:    Current Outpatient Medications:     acetaminophen (TYLENOL) 500 mg tablet, Take 500 mg by mouth every 6 (six) hours as needed for mild pain, Disp: , Rfl:     amLODIPine (NORVASC) 10 mg tablet, Take 1 tablet (10 mg total) by mouth daily, Disp: 90 tablet, Rfl: 3    apixaban (ELIQUIS) 2 5 mg, Take 1 tablet (2 5 mg total) by mouth 2 (two) times a day, Disp: 180 tablet, Rfl: 3    aspirin (ECOTRIN LOW STRENGTH) 81 mg EC tablet, Take 1 tablet (81 mg total) by mouth daily, Disp: , Rfl:     Blood Glucose Monitoring Suppl (ONE TOUCH ULTRA MINI) w/Device KIT, Use daily, Disp: 1 kit, Rfl: 0    cholecalciferol (VITAMIN D3) 1,000 units tablet, Take 1,000 Units by mouth daily, Disp: , Rfl:     gabapentin (NEURONTIN) 100 mg capsule, Take 1 capsule (100 mg total) by mouth 2 (two) times a day, Disp: 180 capsule, Rfl: 3    glimepiride (AMARYL) 1 mg tablet, Take 1 tablet (1 mg total) by mouth daily with breakfast, Disp: 90 tablet, Rfl: 3    glucose blood (OneTouch Ultra) test strip, Check blood sugars once daily, Disp: 100 each, Rfl: 3    isosorbide dinitrate (ISORDIL) 10 mg tablet, Take 1 tablet (10 mg total) by mouth 2 (two) times a day, Disp: 180 tablet, Rfl: 3    lisinopril (ZESTRIL) 20 mg tablet, Take 1 tablet (20 mg total) by mouth daily, Disp: 90 tablet, Rfl: 3    metoprolol tartrate (LOPRESSOR) 25 mg tablet, Take 0 5 tablets (12 5 mg total) by mouth every 12 (twelve) hours, Disp: 90 tablet, Rfl: 3    Omega-3 Fatty Acids (FISH OIL CONCENTRATE PO), Take 1 tablet by mouth daily, Disp: , Rfl:     vitamin B-12 (CYANOCOBALAMIN) 100 MCG tablet, Take 1,000 mcg by mouth daily, Disp: , Rfl:     PHYSICAL EXAM:  Vitals:    06/14/22 0901   BP: 170/70   BP Location: Left arm   Patient Position: Sitting   Cuff Size: Large   Pulse: 72   Resp: 16   Temp: (!) 96 °F (35 6 °C)   TempSrc: Temporal   SpO2: 98%   Weight: 95 kg (209 lb 6 4 oz)   Height: 5' 9" (1 753 m)     Body mass index is 30 92 kg/m²  Physical Exam  Constitutional:       General: He is not in acute distress  HENT:      Right Ear: External ear normal    Eyes:      Conjunctiva/sclera:      Right eye: No hemorrhage  Neck:      Thyroid: No thyromegaly  Cardiovascular:      Rate and Rhythm: Normal rate  Heart sounds: Murmur heard  Pulmonary:      Effort: No accessory muscle usage or respiratory distress  Abdominal:      General: There is no distension  Skin:     General: Skin is warm  Coloration: Skin is not jaundiced  Psychiatric:         Behavior: Behavior is not combative           LAB RESULTS:  Results for orders placed or performed in visit on 06/08/22   CBC and differential   Result Value Ref Range    WBC 5 69 4 31 - 10 16 Thousand/uL    RBC 4 10 3 88 - 5 62 Million/uL    Hemoglobin 12 7 12 0 - 17 0 g/dL    Hematocrit 39 5 36 5 - 49 3 %    MCV 96 82 - 98 fL    MCH 31 0 26 8 - 34 3 pg    MCHC 32 2 31 4 - 37 4 g/dL    RDW 13 0 11 6 - 15 1 %    MPV 9 8 8 9 - 12 7 fL    Platelets 082 615 - 004 Thousands/uL    nRBC 0 /100 WBCs    Neutrophils Relative 56 43 - 75 %    Immat GRANS % 0 0 - 2 %    Lymphocytes Relative 28 14 - 44 %    Monocytes Relative 8 4 - 12 %    Eosinophils Relative 7 (H) 0 - 6 %    Basophils Relative 1 0 - 1 %    Neutrophils Absolute 3 25 1 85 - 7 62 Thousands/µL    Immature Grans Absolute 0 01 0 00 - 0 20 Thousand/uL    Lymphocytes Absolute 1 57 0 60 - 4 47 Thousands/µL    Monocytes Absolute 0 44 0 17 - 1 22 Thousand/µL    Eosinophils Absolute 0 38 0 00 - 0 61 Thousand/µL    Basophils Absolute 0 04 0 00 - 0 10 Thousands/µL   Basic metabolic panel   Result Value Ref Range    Sodium 140 136 - 145 mmol/L    Potassium 4 8 3 5 - 5 3 mmol/L    Chloride 104 100 - 108 mmol/L    CO2 26 21 - 32 mmol/L    ANION GAP 10 4 - 13 mmol/L    BUN 34 (H) 5 - 25 mg/dL    Creatinine 1 99 (H) 0 60 - 1 30 mg/dL    Glucose, Fasting 190 (H) 65 - 99 mg/dL    Calcium 9 0 8 3 - 10 1 mg/dL    eGFR 29 ml/min/1 73sq m   Urinalysis with microscopic   Result Value Ref Range    Clarity, UA Clear     Color, UA Yellow     Specific Gravity, UA 1 025 1 003 - 1 030    pH, UA 5 5 4 5, 5 0, 5 5, 6 0, 6 5, 7 0, 7 5, 8 0    Glucose, UA Negative Negative mg/dl    Ketones, UA Negative Negative mg/dl    Blood, UA Negative Negative    Protein,  (2+) (A) Negative mg/dl    Nitrite, UA Negative Negative    Bilirubin, UA Negative Negative    Urobilinogen, UA 0 2 0 2, 1 0 E U /dl E U /dl    Leukocytes, UA Negative Negative    WBC, UA None Seen None Seen, 2-4, 5-60 /hpf    RBC, UA None Seen None Seen, 2-4 /hpf    Bacteria, UA None Seen None Seen, Occasional /hpf    Epithelial Cells Occasional None Seen, Occasional /hpf   Microalbumin / creatinine urine ratio   Result Value Ref Range    Creatinine, Ur 140 0 mg/dL    Microalbum  ,U,Random 903 0 (H) 0 0 - 20 0 mg/L Microalb Creat Ratio 645 (H) 0 - 30 mg/g creatinine   Phosphorus   Result Value Ref Range    Phosphorus 3 9 2 3 - 4 1 mg/dL   Uric acid   Result Value Ref Range    Uric Acid 7 2 4 2 - 8 0 mg/dL   PTH, intact   Result Value Ref Range    PTH 74 3 18 4 - 80 1 pg/mL   Vitamin D 25 hydroxy   Result Value Ref Range    Vit D, 25-Hydroxy 41 9 30 0 - 100 0 ng/mL       ASSESSMENT and PLAN:  Lea Rothman was seen today for chronic kidney disease and follow-up  Diagnoses and all orders for this visit:    Stage 4 chronic kidney disease ([address] )  -     CBC and differential; Future  -     Basic metabolic panel; Future  -     Urinalysis with microscopic; Future  -     Microalbumin / creatinine urine ratio; Future  -     Phosphorus; Future  -     Uric acid; Future  -     PTH, intact; Future  -     Vitamin D 25 hydroxy; Future    Hypertensive kidney disease with stage 4 chronic kidney disease (HCC)  -     Basic metabolic panel; Future  -     Urinalysis with microscopic; Future  -     Microalbumin / creatinine urine ratio; Future    Vitamin D deficiency  -     Vitamin D 25 hydroxy; Future    Other proteinuria  -     Urinalysis with microscopic; Future  -     Microalbumin / creatinine urine ratio; Future      1  CKD stage 3-4  Multifactorial and suspected due to underlying diabetic nephropathy on top of hypertensive nephrosclerosis plus advanced age  Upon review of old medical records, patient's new baseline creatinine seems to be fluctuating around 1 9-2 1 and in the interim renal function has remained stable with current creatinine of 1 99 with EGFR of 29  Patient seems to have memory issue and overall case was discussed in length with patient's daughter today including possibly T of dialysis in the future if renal function worsen and GFR goes below 15  Looking at overall clinical picture, patient would not be ideal candidate for home modality and would consider incenter hemodialysis    According to patient's daughter, the would need to have family discussion before making any disease and but she is also leaning towards incenter hemodialysis when indicated   Management of diabetes and hypertension as mentioned below  Patient continue to have bilateral musculoskeletal and now been followed by physical therapist   Jyothi Rolle patient to consider taking PRN Tylenol when indicated  Continue to avoid NSAIDs as and recheck renal function before next visit  2  Hypertension in chronic kidney disease  Today's blood pressure was elevated and above the goal and according to patient he has been checking blood pressure on daily basis but does not remember blood pressure readings  Advised patient to keep a log of blood pressure at home and bring it to our office with the next visit  Also advised patient and daughter to contact me if noticed to have SBP > 160 for > 48 hours  For time being monitor hypertension with metoprolol 12 5 mg PO BID, isosorbide 10 mg PO BID, amlodipine 10 mg PO daily and lisinopril 20 mg PO daily today  Advised to follow low-salt diet  Patient had underwent renal artery Doppler on March 2020 and it was difficult to visualize renal artery due to bowel gas pattern  Patient was also found to have diffuse disease in left renal artery but patent  Patient was also found to have > 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 and has refused to see vascular surgeon in the past     3  Diabetes type 2 in chronic kidney disease  Goal Hb A1c would be < 7% for underlying chronic kidney disease  Continue to avoid metformin  Currently patient is taking glimepiride   Defer further management of diabetes to PCP  4  Proteinuria  Multifactorial, suspected due to underlying diabetic nephropathy on top of hypertensive nephrosclerosis  Current urine microalbumin:  Creatinine ratio is 645 mg  Continue lisinopril 20 mg PO daily and recheck proteinuria with the next visit  5  Vitamin-D deficiency    Currently taking cholecalciferol 1000 Units PO daily  Current vitamin-D level is 41 which is at goal and plan to continue cholecalciferol at current dose and recheck vitamin-D level with the next visit  Returns to Nephrology office in 6 months  Future labs ordered  Portions of the record may have been created with voice recognition software  Occasional wrong word or "sound a like" substitutions may have occurred due to the inherent limitations of voice recognition software  Read the chart carefully and recognize, using context, where substitutions have occurred  If you have any questions, please contact the dictating provider

## 2022-06-30 ENCOUNTER — CONSULT (OUTPATIENT)
Dept: GERIATRICS | Age: 87
End: 2022-06-30
Payer: MEDICARE

## 2022-06-30 VITALS
BODY MASS INDEX: 31.81 KG/M2 | HEART RATE: 71 BPM | RESPIRATION RATE: 17 BRPM | HEIGHT: 69 IN | OXYGEN SATURATION: 95 % | WEIGHT: 214.8 LBS | TEMPERATURE: 97.8 F | SYSTOLIC BLOOD PRESSURE: 152 MMHG | DIASTOLIC BLOOD PRESSURE: 68 MMHG

## 2022-06-30 DIAGNOSIS — R26.81 GAIT INSTABILITY: ICD-10-CM

## 2022-06-30 DIAGNOSIS — F03.90 DEMENTIA WITHOUT BEHAVIORAL DISTURBANCE, UNSPECIFIED DEMENTIA TYPE: Primary | ICD-10-CM

## 2022-06-30 DIAGNOSIS — I12.9 HYPERTENSIVE KIDNEY DISEASE WITH STAGE 4 CHRONIC KIDNEY DISEASE (HCC): ICD-10-CM

## 2022-06-30 DIAGNOSIS — I48.0 PAROXYSMAL ATRIAL FIBRILLATION (HCC): Chronic | ICD-10-CM

## 2022-06-30 DIAGNOSIS — E78.2 MIXED HYPERLIPIDEMIA DUE TO TYPE 2 DIABETES MELLITUS (HCC): Chronic | ICD-10-CM

## 2022-06-30 DIAGNOSIS — N18.4 HYPERTENSIVE KIDNEY DISEASE WITH STAGE 4 CHRONIC KIDNEY DISEASE (HCC): ICD-10-CM

## 2022-06-30 DIAGNOSIS — H91.93 BILATERAL HEARING LOSS, UNSPECIFIED HEARING LOSS TYPE: ICD-10-CM

## 2022-06-30 DIAGNOSIS — Z86.73 HISTORY OF CVA (CEREBROVASCULAR ACCIDENT): ICD-10-CM

## 2022-06-30 DIAGNOSIS — E11.69 MIXED HYPERLIPIDEMIA DUE TO TYPE 2 DIABETES MELLITUS (HCC): Chronic | ICD-10-CM

## 2022-06-30 PROCEDURE — 99205 OFFICE O/P NEW HI 60 MIN: CPT | Performed by: STUDENT IN AN ORGANIZED HEALTH CARE EDUCATION/TRAINING PROGRAM

## 2022-06-30 NOTE — PROGRESS NOTES
Franca [facility]  [address] [facility], [address] [place] [address]  [phone]    Social Work Intake    Kathleen Espino presents today for a geriatric assessment, accompanied by Nolon Headings  Caregiver main concern(s): Recommendation from PCP    SOCIAL HISTORY  Patient: Kathleen Espino  Current Living Situation: Lives with girlfriend, 1 story, 4 steps to enter  Is respite needed for caregiver(s)? No  Who is available to provide care in case of illness or crisis (please specify relation to patient / level of care able to provide)? Girlfriend- Shirley    FAMILY BACKGROUND  Marital status:   x2  Does patient have children? Yes How Many? 3    Where do the children live? Family members assisting patient at home: Girlfriend    EMPLOYMENT & EDUCATION  Education: Highest Level of Education: High School Graduate   Currently Employed: No  Retired: Yes                        Date of senior care? 2002  Type of employment: Sewage treatment     RELATIONSHIPS  Are any relationships causing problems right now: No    [address] and Organizations: [facility]  Major life events in past two years (ex: senior care, death in family, major illness etc ): sister passed away, brother is in memory care unit, brother-in-law passed away  Does the patient currently drive: Yes     If not, date stopped driving:     [address] which have helped with shopping, meals, bathing, etc : Physical therapy, wound care  [address] that assessment team feels would be beneficial to patient/family:     [address]: No        Benefits (if applicable): FINANCIAL  Total Monthly income: $1800  Source of income: Social Security/Pension  Meet current needs? Yes   Funds available for home care? No  Funds available for nursing home? No  Do you rent or own your home?  Own    LEGAL  Advanced directives: POA & living will    For all patients, we encourage seeing a  to establish a Financial Power of Albina Corinna, a [address], and an Advanced Directive (living will)  Particularly for patients experiencing memory concerns, we strongly recommend that this is completed as soon as possible  SAFETY ASSESSMENT     FIRE HAZARDS  Does the residence have smoke alarm?     unsure    Does the residence have a fire escape? One exit-mobile home  Are any of the following present in the residence? Frayed Wires       No   Clutter        No   Incorrect use of open flame     No    FALL HAZARDS  Do any of the following exist in the residence? Poor lighting       No   Loose Rugs       Yes    Obstacles       No   Uneven floors       No   Slippery floors       No   Unsafe stairs       No  Does the patient use a fall alert? No   If yes, which one? HEALTH HAZARDS   Does the residence have any of the following? Adequate plumbing / heat / ventilation   Yes   Are there signs of neglect such as the following? Unkempt house      No   Old food in refrigerator     No   Infestation       No    EMERGENCY HEALTH PLAN   Is there a phone that is accessible to the patient or caregiver? Yes   Is the number to police, physician, and 911 easily accessible? Yes   Would the patient be able to call 911 in an emergency? No    ENVIRONMENT APPROPRIATENESS  Please note if each is available and accessible to the patient:   [place] [facility] / Ivonne Wall / Meka [facility] / Living Room   Yes     Is the patient able to climb stairs if necessary? Yes   Does the patient use any assistant devices for ambulation? No   If yes, please list which device required   Should be using cane    Does the bathroom have any of the following? Handrails in tub or toilet     Yes    Raised toilet seat      No   Rubber tub mat      unknown   Hot water       Yes     Can the patient independently do the following?    Shower       Yes    Toilet        No   Dress them selves      No    Pick appropriate clothing     No   Eat        Yes    Drink        Yes     OTHER  Are there any weapons in the home? Yes  out in shed  Is there a system in place for medication management?   daughter assists packing an alarmed pill dispenser      [place] Cognitive Assessment (MoCA) Version 8 2  Education: High School    Points Earned POSSIBLE Points   Visuospatial/Executive   Alternating Middle Bass Making 0 1   Visuoconstructional skills 0 1   Visuoconstructional skills (clock) 2 3   Naming   Naming Animals 3 3   Attention   Digit Span 2 2   Vigilance (letters) 0 1   Serial 7 subtraction 2 3   Language   Sentence Repetition 1 2   Verbal fluency 0 1   Abstraction   Abstraction (word pairings) 0 2   Delayed recall   Delayed recall 0 5   Memory index score: 5/15   Orientation   Orientation 2 6   TOTAL SCORE: 13/30  (Normal ?26/30)   Additional notes:  MoCA completed by certified medical assistant Landen Reece
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
## 2022-07-01 PROBLEM — R26.81 GAIT INSTABILITY: Status: ACTIVE | Noted: 2022-07-01

## 2022-07-01 PROBLEM — H91.90 HEARING LOSS: Status: ACTIVE | Noted: 2022-07-01

## 2022-07-01 NOTE — ASSESSMENT & PLAN NOTE
No cardiorespiratory distress, heart rate remains controlled  Continue Eliquis, metoprolol  Follow-up Cardiology as scheduled

## 2022-07-01 NOTE — ASSESSMENT & PLAN NOTE
TUGT 15 sec  Patient with a prior history of fall in the past 1 year  Would recommend physical therapy for gait training, balance and strengthening  Recommend a falls Alert device as a safety precaution

## 2022-07-01 NOTE — ASSESSMENT & PLAN NOTE
[address] [place] 13/30, CSDD 5, TUGT 15sec  Patient with notable deficits in visual spatial, executive function, attention, language, abstraction, delayed recall and orientation domains  CTH:Chronic lacunar infarction(s) are noted in basal ganglia, chronic advanced microangiopathic change  Will order TSH, B12, folate  Given history, physical exam above neurocognitive screening, this places the patient at a level moderate dementia  Etiology likely vascular dementia though there may be a neurocognitive contributing component  Will refer to occupational therapy for a fit to drive  Will refer for cognitive therapy  Currently this patient does meet criteria for increased supervision    This may consist of transitioning to higher level of care versus ensuring increased supervision at home with home health aides and closer monitoring  Recommend blister packaging for ease of medication administration  Recommend a falls Alert device as a safety precaution  Consider Meals on wheels as a delivery service  Recommend enrolling into a senior  program for positive socialization, physical and cognitive stimulation  Recommend the [address] shown to decrease risk of memory loss and CVA  Reorientation and redirection as needed  Manage chronic conditions  Maintain Falls precautions  Encourage the patient to remain active mentally, physically and socially  Participate in cognitively stimulating exercises as able

## 2022-07-01 NOTE — ASSESSMENT & PLAN NOTE
Lab Results   Component Value Date    EGFR 29 06/08/2022    EGFR 32 03/01/2022    EGFR 31 11/17/2021    CREATININE 1 99 (H) 06/08/2022    CREATININE 1 87 (H) 03/01/2022    CREATININE 1 94 (H) 11/17/2021   /68, uncontrolled  Continue amlodipine, lisinopril  Follow-up with PCP for continued monitoring

## 2022-07-01 NOTE — ASSESSMENT & PLAN NOTE
No new focal neurological deficits   Imperative to manage vascular risk factors  Maintain Falls precautions

## 2022-07-01 NOTE — ASSESSMENT & PLAN NOTE
Lab Results   Component Value Date    HGBA1C 6 3 (H) 03/01/2022   Patient maintained on glimepiride  Recommend a diabetic, heart healthy diet  Follow-up with PCP for continued monitoring

## 2022-07-01 NOTE — ASSESSMENT & PLAN NOTE
Recommend referral to audiology  Recommend facing patient directly and standing in close proximity when communicating to avoid confusion  Maintain Falls precautions

## 2022-07-06 ENCOUNTER — APPOINTMENT (OUTPATIENT)
Dept: LAB | Facility: HOSPITAL | Age: 87
End: 2022-07-06
Payer: MEDICARE

## 2022-07-06 DIAGNOSIS — F03.90 DEMENTIA WITHOUT BEHAVIORAL DISTURBANCE, UNSPECIFIED DEMENTIA TYPE: ICD-10-CM

## 2022-07-06 DIAGNOSIS — E11.22 TYPE 2 DIABETES MELLITUS WITH STAGE 3B CHRONIC KIDNEY DISEASE, WITHOUT LONG-TERM CURRENT USE OF INSULIN (HCC): ICD-10-CM

## 2022-07-06 DIAGNOSIS — E78.2 MIXED HYPERLIPIDEMIA DUE TO TYPE 2 DIABETES MELLITUS (HCC): Chronic | ICD-10-CM

## 2022-07-06 DIAGNOSIS — N18.32 TYPE 2 DIABETES MELLITUS WITH STAGE 3B CHRONIC KIDNEY DISEASE, WITHOUT LONG-TERM CURRENT USE OF INSULIN (HCC): ICD-10-CM

## 2022-07-06 DIAGNOSIS — E11.69 MIXED HYPERLIPIDEMIA DUE TO TYPE 2 DIABETES MELLITUS (HCC): Chronic | ICD-10-CM

## 2022-07-06 LAB
ANION GAP SERPL CALCULATED.3IONS-SCNC: 8 MMOL/L (ref 4–13)
BUN SERPL-MCNC: 33 MG/DL (ref 5–25)
CALCIUM SERPL-MCNC: 9.1 MG/DL (ref 8.3–10.1)
CHLORIDE SERPL-SCNC: 103 MMOL/L (ref 100–108)
CHOLEST SERPL-MCNC: 230 MG/DL
CO2 SERPL-SCNC: 27 MMOL/L (ref 21–32)
CREAT SERPL-MCNC: 1.91 MG/DL (ref 0.6–1.3)
EST. AVERAGE GLUCOSE BLD GHB EST-MCNC: 140 MG/DL
FOLATE SERPL-MCNC: >20 NG/ML (ref 3.1–17.5)
GFR SERPL CREATININE-BSD FRML MDRD: 31 ML/MIN/1.73SQ M
GLUCOSE P FAST SERPL-MCNC: 127 MG/DL (ref 65–99)
HBA1C MFR BLD: 6.5 %
HDLC SERPL-MCNC: 53 MG/DL
LDLC SERPL CALC-MCNC: 151 MG/DL (ref 0–100)
POTASSIUM SERPL-SCNC: 4.6 MMOL/L (ref 3.5–5.3)
SODIUM SERPL-SCNC: 138 MMOL/L (ref 136–145)
TRIGL SERPL-MCNC: 128 MG/DL
TSH SERPL DL<=0.05 MIU/L-ACNC: 3.35 UIU/ML (ref 0.45–4.5)
VIT B12 SERPL-MCNC: 1621 PG/ML (ref 100–900)

## 2022-07-06 PROCEDURE — 80048 BASIC METABOLIC PNL TOTAL CA: CPT

## 2022-07-06 PROCEDURE — 82746 ASSAY OF FOLIC ACID SERUM: CPT

## 2022-07-06 PROCEDURE — 36415 COLL VENOUS BLD VENIPUNCTURE: CPT

## 2022-07-06 PROCEDURE — 83036 HEMOGLOBIN GLYCOSYLATED A1C: CPT

## 2022-07-06 PROCEDURE — 82607 VITAMIN B-12: CPT

## 2022-07-06 PROCEDURE — 84443 ASSAY THYROID STIM HORMONE: CPT

## 2022-07-06 PROCEDURE — 80061 LIPID PANEL: CPT

## 2022-07-12 ENCOUNTER — OFFICE VISIT (OUTPATIENT)
Dept: INTERNAL MEDICINE CLINIC | Facility: CLINIC | Age: 87
End: 2022-07-12
Payer: MEDICARE

## 2022-07-12 VITALS
OXYGEN SATURATION: 99 % | SYSTOLIC BLOOD PRESSURE: 134 MMHG | BODY MASS INDEX: 31.73 KG/M2 | HEIGHT: 69 IN | DIASTOLIC BLOOD PRESSURE: 60 MMHG | HEART RATE: 60 BPM | WEIGHT: 214.2 LBS

## 2022-07-12 DIAGNOSIS — I10 ESSENTIAL HYPERTENSION: Chronic | ICD-10-CM

## 2022-07-12 DIAGNOSIS — E11.69 MIXED HYPERLIPIDEMIA DUE TO TYPE 2 DIABETES MELLITUS (HCC): Chronic | ICD-10-CM

## 2022-07-12 DIAGNOSIS — E78.2 MIXED HYPERLIPIDEMIA DUE TO TYPE 2 DIABETES MELLITUS (HCC): Chronic | ICD-10-CM

## 2022-07-12 DIAGNOSIS — N18.32 HYPERTENSIVE KIDNEY DISEASE WITH STAGE 3B CHRONIC KIDNEY DISEASE (HCC): ICD-10-CM

## 2022-07-12 DIAGNOSIS — E11.22 TYPE 2 DIABETES MELLITUS WITH STAGE 3B CHRONIC KIDNEY DISEASE, WITHOUT LONG-TERM CURRENT USE OF INSULIN (HCC): Primary | Chronic | ICD-10-CM

## 2022-07-12 DIAGNOSIS — N40.1 BPH WITH OBSTRUCTION/LOWER URINARY TRACT SYMPTOMS: ICD-10-CM

## 2022-07-12 DIAGNOSIS — N18.32 TYPE 2 DIABETES MELLITUS WITH STAGE 3B CHRONIC KIDNEY DISEASE, WITHOUT LONG-TERM CURRENT USE OF INSULIN (HCC): Primary | Chronic | ICD-10-CM

## 2022-07-12 DIAGNOSIS — L30.9 DERMATITIS: ICD-10-CM

## 2022-07-12 DIAGNOSIS — N13.8 BPH WITH OBSTRUCTION/LOWER URINARY TRACT SYMPTOMS: ICD-10-CM

## 2022-07-12 DIAGNOSIS — I12.9 HYPERTENSIVE KIDNEY DISEASE WITH STAGE 3B CHRONIC KIDNEY DISEASE (HCC): ICD-10-CM

## 2022-07-12 PROCEDURE — 99214 OFFICE O/P EST MOD 30 MIN: CPT | Performed by: INTERNAL MEDICINE

## 2022-07-12 RX ORDER — TRIAMCINOLONE ACETONIDE 1 MG/G
CREAM TOPICAL 2 TIMES DAILY
Qty: 453.6 G | Refills: 1 | Status: SHIPPED | OUTPATIENT
Start: 2022-07-12

## 2022-07-12 RX ORDER — FINASTERIDE 5 MG/1
5 TABLET, FILM COATED ORAL DAILY
Qty: 90 TABLET | Refills: 3 | Status: SHIPPED | OUTPATIENT
Start: 2022-07-12

## 2022-07-12 RX ORDER — GLIMEPIRIDE 1 MG/1
1 TABLET ORAL
Qty: 90 TABLET | Refills: 3 | Status: SHIPPED | OUTPATIENT
Start: 2022-07-12

## 2022-07-12 RX ORDER — AMLODIPINE BESYLATE 10 MG/1
10 TABLET ORAL DAILY
Qty: 90 TABLET | Refills: 3 | Status: SHIPPED | OUTPATIENT
Start: 2022-07-12

## 2022-07-12 NOTE — PATIENT INSTRUCTIONS
Type 2 Diabetes Management for Adults   WHAT YOU NEED TO KNOW:   What is type 2 diabetes? Type 2 diabetes is a disease that affects how your body uses glucose (sugar)  Either your body cannot make enough insulin, or it cannot use the insulin correctly  It is important to keep diabetes controlled to prevent damage to your heart, blood vessels, and other organs  What do I need to know about high blood sugar levels? High blood sugar levels may not cause any symptoms  You may feel more thirsty or urinate more often than usual  Over time, high blood sugar levels can damage your nerves, blood vessels, tissues, and organs  The following can increase your blood sugar levels:  Large meals or large amounts of carbohydrates at one time    Less physical activity    Stress    Illness    A lower dose of medicine or insulin, or a late dose    What do I need to know about low blood sugar levels? You can prevent symptoms such as shakiness, dizziness, irritability, or confusion by preventing your blood sugar levels from going too low  Treat a low blood sugar level right away  Drink 4 ounces of juice or have 1 tube of glucose gel  Check your blood sugar level again 10 to 15 minutes later  When the level goes back to normal, eat a meal or snack to prevent another decrease  Keep glucose gel, raisins, or hard candy with you at all times to treat a low blood sugar level  Your blood sugar level can get too low if you take diabetes medicine or insulin and do not eat enough food  If you use insulin, check your blood sugar level before you exercise  If your blood sugar level is below 100 mg/dL, eat 4 crackers or 2 ounces of raisins, or drink 4 ounces of juice  Check your level every 30 minutes if you exercise more than 1 hour  You may need a snack during or after exercise  What can I do to manage my blood sugar levels? Check your blood sugar levels as directed and as needed    Several items are available to use to check your levels  You may need to check by testing a drop of blood in a glucose monitor  You may instead be given a continuous glucose monitoring (CGM) device  The device is worn at all times  The CGM checks your blood sugar level every 5 minutes  It sends results to an electronic device such as a smart phone  A CGM can be used with or without an insulin pump  Talk with your provider to find out which method is best for you  The goal for blood sugar levels before meals  is between 80 and 130 mg/dL and 2 hours after eating  is lower than 180 mg/dL  Make healthy food choices  Work with a dietitian to develop a meal plan that works for you and your schedule  A dietitian can help you learn how to eat the right amount of carbohydrates during your meals and snacks  Carbohydrates can raise your blood sugar level if you eat too many at one time  Examples of foods that contain carbohydrates are breads, cereals, rice, pasta, and sweets  Get regular physical activity  Physical activity can help you get to your target blood sugar level goal and manage your weight  Get at least 150 minutes of moderate to vigorous aerobic physical activity each week  Do not miss more than 2 days in a row  Do not sit longer than 30 minutes at a time  Your healthcare provider can help you create an activity plan  The plan can include the best activities for you and can help you build your strength and endurance  Maintain a healthy weight  Ask your healthcare provider what a healthy weight is for you  Ask him or her to help you create a safe weight loss plan if you are overweight  Take your diabetes medicine or insulin as directed  You may need diabetes medicine, insulin, or both to help control your blood sugar levels  Your healthcare provider will teach you how and when to take your diabetes medicine or insulin   You will also be taught about side effects oral diabetes medicine can cause  Insulin may be injected, or given through a pump or pen  You and your care team will discuss which method is best for you  An insulin pump  is an implanted device that gives your insulin 24 hours a day  An insulin pump prevents the need for multiple insulin injections in a day  An insulin pen  is a device prefilled with the right amount of insulin  You and your family members will be taught how to draw up and give insulin  if this is the best method for you  Your education team will also teach you how to dispose of needles and syringes  You will learn how much insulin you need  and when to give it  You will be taught when not to give insulin  You will also be taught what to do if your blood sugar level drops too low  This may happen if you take insulin and do not eat the right amount of carbohydrates  What else can I do to manage type 2 diabetes? Wear medical alert identification  Wear medical alert jewelry or carry a card that says you have diabetes  Ask your provider where to get these items  Do not smoke  Nicotine and other chemicals in cigarettes and cigars can cause lung and blood vessel damage  It also makes it more difficult to manage your diabetes  Ask your provider for information if you currently smoke and need help to quit  Do not use e-cigarettes or smokeless tobacco in place of cigarettes or to help you quit  They still contain nicotine  Check your feet each day for cuts, scratches, calluses, or other wounds  Look for redness and swelling, and feel for warmth  Wear shoes that fit well  Check your shoes for rocks or other objects that can hurt your feet  Do not walk barefoot or wear shoes without socks  Wear cotton socks to help keep your feet dry  Ask about vaccines you may need  You have a higher risk for serious illness if you get the flu, pneumonia, COVID-19, or hepatitis   Ask your provider if you should get vaccines to prevent these or other diseases, and when to get the vaccines  Talk to your care team if you become stressed about diabetes care  Sometimes being able to fit diabetes care into your life can cause increased stress  The stress can cause you not to take care of yourself properly  Your care team can help by offering tips about self-care  Your care team may suggest you talk to a mental health provider  The provider can listen and offer help with self-care issues  Have someone call your local emergency number (911 in the [address] [facility]) if:   You cannot be woken  You have signs of diabetic ketoacidosis:     confusion, fatigue    vomiting    rapid heartbeat    fruity smelling breath    extreme thirst    dry mouth and skin    You have any of the following signs of a heart attack:      Squeezing, pressure, or pain in your chest    You may  also have any of the following:     Discomfort or pain in your back, neck, jaw, stomach, or arm    Shortness of breath    Nausea or vomiting    Lightheadedness or a sudden cold sweat    You have any of the following signs of a stroke:      Numbness or drooping on one side of your face     Weakness in an arm or leg    Confusion or difficulty speaking    Dizziness, a severe headache, or vision loss    When should I call my doctor or diabetes care team?   You have a sore or wound that will not heal     You have a change in the amount you urinate  Your blood sugar levels are higher than your target goals  You often have lower blood sugar levels than your target goals  Your skin is red, dry, warm, or swollen  You have trouble coping with diabetes, or you feel anxious or depressed  You have questions or concerns about your condition or care  CARE AGREEMENT:   You have the right to help plan your care  Learn about your health condition and how it may be treated  Discuss treatment options with your healthcare providers to decide what care you want to receive   You always have the right to refuse treatment  The above information is an  only  It is not intended as medical advice for individual conditions or treatments  Talk to your doctor, nurse or pharmacist before following any medical regimen to see if it is safe and effective for you  © Copyright [employer] 2022 Information is for End User's use only and may not be sold, redistributed or otherwise used for commercial purposes   All illustrations and images included in CareNotes® are the copyrighted property of A D A M , Inc  or [address] [place] [address]

## 2022-07-12 NOTE — PROGRESS NOTES
[place]    NAME: Cindy Viramontes  AGE: 82 y o  SEX: male  : 1935     DATE: 2022     Assessment and Plan:     1  Type 2 diabetes mellitus with stage 3b chronic kidney disease, without long-term current use of insulin ([facility] )    Most recent A1c was 6 5 % on 2022  Sugars very well controlled  Renal function stable with Cr around 1 9  Follows with nephrology  Stay hydrated  - glimepiride (AMARYL) 1 mg tablet; Take 1 tablet (1 mg total) by mouth daily with breakfast  Dispense: 90 tablet; Refill: 3  - Hemoglobin A1C; Future    2  Mixed hyperlipidemia due to type 2 diabetes mellitus ([facility] )    Cannot tolerate statins  Heart healthy diet recommended  - Lipid panel; Future    3  Hypertensive kidney disease with stage 3b chronic kidney disease ([facility] )    Blood pressure well controlled  Continue current anti-HTN therapy  4  BPH with obstruction/lower urinary tract symptoms    Having symptoms of BPH  Not emptying his bladder fully  Will try him on proscar  Discussed it will take some time to see possible benefit  - finasteride (PROSCAR) 5 mg tablet; Take 1 tablet (5 mg total) by mouth daily  Dispense: 90 tablet; Refill: 3    5  Dermatitis  - triamcinolone (KENALOG) 0 1 % cream; Apply topically 2 (two) times a day  Dispense: 453 6 g; Refill: 1    6  Essential hypertension  - amLODIPine (NORVASC) 10 mg tablet; Take 1 tablet (10 mg total) by mouth daily  Dispense: 90 tablet; Refill: 3        Return in about 6 months (around 2023) for Subsequent AWV  History of Present Illness:     John Ballesteros presents for f/u  Recently saw [facility]  Has moderate degree of dementia  Needs to do fit to drive test down in [place]  No real changes otherwise with his health  He deals with chronic back pain which limits him from doing much  Going the bathroom a lot   Does feel like he empties all the way but he will go the bathroom and then have to go again in 30 minutes  Also has a rash on his chest      Review of Systems:     Review of Systems   Constitutional: Negative  Respiratory: Negative  Cardiovascular: Negative  Gastrointestinal: Negative  Genitourinary: Positive for frequency  Musculoskeletal: Positive for back pain  Skin: Positive for rash  Objective:     /60 (BP Location: Left arm, Patient Position: Sitting, Cuff Size: Standard)   Pulse 60   Ht 5' 9 29" (1 76 m)   Wt 97 2 kg (214 lb 3 2 oz)   SpO2 99%   BMI 31 37 kg/m²     Physical Exam  Constitutional:       General: He is not in acute distress  Appearance: He is not ill-appearing  Cardiovascular:      Rate and Rhythm: Normal rate and regular rhythm  Heart sounds: No murmur heard  Pulmonary:      Effort: Pulmonary effort is normal  No respiratory distress  Breath sounds: No wheezing  Abdominal:      General: Bowel sounds are normal  There is no distension  Tenderness: There is no abdominal tenderness  Musculoskeletal:      Right lower leg: No edema  Left lower leg: No edema  Skin:     Findings: Rash present  Neurological:      Mental Status: He is alert         Liudmila Howard DO  [facility]

## 2022-07-13 NOTE — PROGRESS NOTES
[facility]  [address] [facility] [address] [place] [facility], [address] [facility]  [phone]    Care Conference    NAME: Cindy Viramontes  AGE: 82 y o  SEX: male  YOB: 1935  DATE OF ASSESSMENT: 06/30/22  DATE OF CONFERENCE: 07/14/22    Family Present: Eden Sagar (daughter)  Staff Present: Dr Francisco Encarnacion    Patient / Family Goals of Care: Review cognitive decline and associated symptoms, discuss treatment options and care planning  Medical Concerns (Current/Historical): History of CVA (cerebrovascular accident), Hypertensive kidney disease with stage 4 chronic kidney disease, Paroxysmal atrial fibrillation, mixed hyperlipidemia due to type 2 diabetes mellitus    Geriatric Syndromes/Age Related Syndromes: Dementia without behavioral disturbance, bilateral hearing loss, gait instability    Neuropsychological   Dementia without behavioral disturbance   Patient with notable deficits in visual spatial, executive function, attention, language, abstraction, delayed recall and orientation domains   Given history, physical exam, neurocognitive screening below, this places the patient at a level of moderate dementia  Etiology likely vascular dementia but possible neurocognitive component   o Salvatore Cognitive Assessment: 13/30  o CSDD 5  · Decision-making capacity: The patient should not make any medical or financial decisions independently without the presence of his POA as assessed during this visit  · Staging: Moderate dementia, FAST stage 4-5   Remain active physically, mentally and socially   Will refer for Cognitive Therapy   Will refer to Occupational Therapy for [employer] [address] [place] [address] and non-pharmaceutical interventions discussed  Consider trialling Ai Herd - daughter to discuss with family and call the office with decision   Educated on side effects of namenda   Recommend the Mediterranean diet, shown to decrease risk of memory loss and CVA   Engage in cognitively challenging exercises such as crosswords and puzzles   Consider use of apps such as Lumosity  com, Simple-Fill, Wellpepper for cognitively stimulating online games   Consider enrolling into a senior  program for positive socialization, physical and cognitive stimulation   Recommend the memory cafe for positive socialization   Encourage enrolling into a senior center/[employer] socialization   Discussed with patient's daughter that currently the patient does meet criteria for placement into a higher level of care  Alternatively, home health aides can be instituted should family wish to keep the patient at home   Consider a meal delivery service such as Meals on wheels or mom's Meals   Recommend a falls Alert device as a safety precaution   Consider blister packaging for ease of medication administration   Maintain chronic conditions under control   Repeat cognitive assessment in 6 months    Social / Safety Concerns  · Currently patient meets criteria for increased supervision   This may consist of transitioning to higher level of care versus ensuring increased supervision at home with home health aides and closer monitoring  · Consider Meals on Wheels   Consider an Adult Day Program for positive socialization, physical exercise, cognitive stimulation and family respite   Stay in touch with family and friends   Plan self-care activities for your mental well-being each week   Recommend review of fall risk prevention tips   Recommend use of fall precautions including fall alert device   Consider assistance for medication administration such as blister packaging or use of an automated pill dispenser   Recommend contacting your local [address] on Aging for possible eligible programs such as OPTIONS, Caregiver Support Program, or [address] [place] [address] Maintain updated advance directives and provide a copy to your primary care provider   Consider caregiver support groups and educational resources through the Alzheimer's Association; access Alzheimer's Association 24/7 Helpline at 7-419.344.6014  ? [address] [facility] does offer a monthly caregiver support group  If interested, please speak with a      Utilize reorientation and redirection as needed (dependent on situation)  [employer] information provided    Diagnostic Studies   Review of bloodwork: TSH, B12, Folate   Review of previous imaging: CTH: chronic lacunar infarction(s) are noted in basal ganglia, chronic advanced microangiopathic change    Physical Finding Impacting Function   Timed Up and Go Test: 15 seconds (Fall risk assessment:moderate)   Activities of Daily Living: Independent   Instrumental Activities of Daily Living: Independent     Encourage appropriate footwear at all times   Review fall risk prevention tips and adjust within the home environment as needed    Medications Reviewed    Medications seem appropriate for present conditions   Check with PCP before using over the counter medications   Avoid over the counter medications that can affect cognition (e g , Benadryl, Tylenol PM)    Avoid NSAIDs due to risk of GI bleed and renal impairment    Other Findings   Overall health   BMI: 31 37 kg/m2   Maintain well-balanced diet   Continue following with primary care physician regularly  History of CVA   No new focal neurological deficits   Imperative to manage vascular risk factors   Maintain fall precautions  Hypertensive kidney disease with stage 4 chronic kidney disease   /68, uncontrolled   Continue amlodipine, lisinopril   Follow up with PCP for continued monitoring  Paroxysmal atrial fibrillation   No cardiorespiratory distress, heart rate remains controlled   Continue eliquis, metoprolol   Follow-up with cardiology as scheduled  Gait Instability   Patient with a prior history of fall in the past 1 year   Continue physical therapy for gait training, balance and strengthening   Recommend a fall alert device as safety precaution  Bilateral hearing loss, unspecified hearing loss type   Recommend referral to audiology   Recommend facing patient directly and standing in close proximity when communicating to avoid confusion   Maintain fall precautions  Mixed hyperlipidemia due to type 2 diabetes mellitus   HGBA1C 6 3 03/01/22   Patient maintained on glimepiride   Recommend a diabetic, heart healthy diet   Follow-up with PCP for continued monitoring  Chronic back pain   Follow up with PCP, consider referral to  Pain Mx, consider salon pas patches or topical aspercreme  B12 deficiency   Decrease B12 supplements to [facility]- Friday instead of 7 days weekly    Recommended Health Maintenance   Immunizations, if not contraindicated:    Influenza vaccine yearly   Pneumo vaccine every 5 years (65 years and over)   Shingles vaccine   COVID-19 vaccine    Patient and family verbalized understanding of above care plan  For care coordination purposes, this care plan will be shared with your primary care provider  With any questions, please contact our office at [phone]

## 2022-07-14 ENCOUNTER — TELEMEDICINE (OUTPATIENT)
Dept: GERIATRICS | Age: 87
End: 2022-07-14
Payer: MEDICARE

## 2022-07-14 DIAGNOSIS — Z86.73 HISTORY OF CVA (CEREBROVASCULAR ACCIDENT): Chronic | ICD-10-CM

## 2022-07-14 DIAGNOSIS — E11.22 TYPE 2 DIABETES MELLITUS WITH STAGE 3B CHRONIC KIDNEY DISEASE, WITHOUT LONG-TERM CURRENT USE OF INSULIN (HCC): Chronic | ICD-10-CM

## 2022-07-14 DIAGNOSIS — H91.93 BILATERAL HEARING LOSS, UNSPECIFIED HEARING LOSS TYPE: ICD-10-CM

## 2022-07-14 DIAGNOSIS — F03.90 DEMENTIA WITHOUT BEHAVIORAL DISTURBANCE, UNSPECIFIED DEMENTIA TYPE: Primary | ICD-10-CM

## 2022-07-14 DIAGNOSIS — N18.32 TYPE 2 DIABETES MELLITUS WITH STAGE 3B CHRONIC KIDNEY DISEASE, WITHOUT LONG-TERM CURRENT USE OF INSULIN (HCC): Chronic | ICD-10-CM

## 2022-07-14 DIAGNOSIS — E11.69 MIXED HYPERLIPIDEMIA DUE TO TYPE 2 DIABETES MELLITUS (HCC): Chronic | ICD-10-CM

## 2022-07-14 DIAGNOSIS — R26.81 GAIT INSTABILITY: ICD-10-CM

## 2022-07-14 DIAGNOSIS — E78.2 MIXED HYPERLIPIDEMIA DUE TO TYPE 2 DIABETES MELLITUS (HCC): Chronic | ICD-10-CM

## 2022-07-14 PROCEDURE — 99483 ASSMT & CARE PLN PT COG IMP: CPT | Performed by: STUDENT IN AN ORGANIZED HEALTH CARE EDUCATION/TRAINING PROGRAM
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
## 2022-07-18 ENCOUNTER — OFFICE VISIT (OUTPATIENT)
Dept: OCCUPATIONAL THERAPY | Facility: CLINIC | Age: 87
End: 2022-07-18
Payer: MEDICARE

## 2022-07-18 DIAGNOSIS — F03.90 DEMENTIA WITHOUT BEHAVIORAL DISTURBANCE, UNSPECIFIED DEMENTIA TYPE: Primary | ICD-10-CM

## 2022-07-18 PROCEDURE — 96125 COGNITIVE TEST BY HC PRO: CPT

## 2022-07-18 PROCEDURE — 97165 OT EVAL LOW COMPLEX 30 MIN: CPT
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
## 2022-07-19 ENCOUNTER — TELEPHONE (OUTPATIENT)
Dept: GERIATRICS | Age: 87
End: 2022-07-19

## 2022-07-19 NOTE — TELEPHONE ENCOUNTER
----- Message from Jacki Perez MD sent at 7/19/2022  3:48 AM EDT -----  Pls schedule with daughter, a 30 min virtual appt to review driving eval

## 2022-07-20 ENCOUNTER — TELEMEDICINE (OUTPATIENT)
Dept: GERIATRICS | Age: 87
End: 2022-07-20
Payer: MEDICARE

## 2022-07-20 DIAGNOSIS — F03.90 DEMENTIA WITHOUT BEHAVIORAL DISTURBANCE, UNSPECIFIED DEMENTIA TYPE: ICD-10-CM

## 2022-07-20 DIAGNOSIS — H91.93 BILATERAL HEARING LOSS, UNSPECIFIED HEARING LOSS TYPE: ICD-10-CM

## 2022-07-20 DIAGNOSIS — Z91.89 DRIVING SAFETY ISSUE: Primary | ICD-10-CM

## 2022-07-20 PROCEDURE — 99214 OFFICE O/P EST MOD 30 MIN: CPT | Performed by: STUDENT IN AN ORGANIZED HEALTH CARE EDUCATION/TRAINING PROGRAM

## 2022-07-21 ENCOUNTER — TELEPHONE (OUTPATIENT)
Dept: GERIATRICS | Age: 87
End: 2022-07-21
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
## 2022-07-21 NOTE — ASSESSMENT & PLAN NOTE
Recommend referral to audiology  Recommend facing patient directly and standing in close proximity when communicating to avoid confusion

## 2022-07-21 NOTE — TELEPHONE ENCOUNTER
----- Message from Efra Garland MD sent at 7/21/2022  4:38 AM EDT -----  Can we pls complete a birgit dot suspension of licence pls
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
## 2022-08-26 ENCOUNTER — OFFICE VISIT (OUTPATIENT)
Dept: CARDIOLOGY CLINIC | Facility: CLINIC | Age: 87
End: 2022-08-26
Payer: MEDICARE

## 2022-08-26 VITALS
HEIGHT: 69 IN | HEART RATE: 59 BPM | WEIGHT: 216.2 LBS | DIASTOLIC BLOOD PRESSURE: 80 MMHG | SYSTOLIC BLOOD PRESSURE: 146 MMHG | OXYGEN SATURATION: 96 % | BODY MASS INDEX: 32.02 KG/M2

## 2022-08-26 DIAGNOSIS — I35.0 NONRHEUMATIC AORTIC VALVE STENOSIS: ICD-10-CM

## 2022-08-26 DIAGNOSIS — I25.10 CORONARY ARTERY DISEASE INVOLVING NATIVE CORONARY ARTERY OF NATIVE HEART WITHOUT ANGINA PECTORIS: ICD-10-CM

## 2022-08-26 DIAGNOSIS — I10 ESSENTIAL HYPERTENSION: ICD-10-CM

## 2022-08-26 DIAGNOSIS — E78.2 MIXED HYPERLIPIDEMIA: ICD-10-CM

## 2022-08-26 DIAGNOSIS — Z79.01 CHRONIC ANTICOAGULATION: ICD-10-CM

## 2022-08-26 DIAGNOSIS — I48.0 PAROXYSMAL ATRIAL FIBRILLATION (HCC): Primary | ICD-10-CM

## 2022-08-26 PROCEDURE — 99214 OFFICE O/P EST MOD 30 MIN: CPT | Performed by: INTERNAL MEDICINE

## 2022-08-26 NOTE — PROGRESS NOTES
[facility] [place]  [id number]  Cardiology Follow Up    [facility]  1935  [id number]      1  Paroxysmal atrial fibrillation (HCC)     2  Nonrheumatic aortic valve stenosis     3  Coronary artery disease involving native coronary artery of native heart without angina pectoris     4  Chronic anticoagulation     5  Essential hypertension     6  Mixed hyperlipidemia         Chief Complaint   Patient presents with    Follow-up     6 month follow up       Interval History:  Patient presents for follow-up visit  Patient denies any history of chest pain shortness of breath  Patient denies any history of leg edema or orthopnea PND  No history of presyncope syncope  Patient states compliance with the present list of medications  Patient denies any bleeding issues  Patient has significant cognitive difficulty evaluated by Neurology  Daughter present during the office visit      Patient Active Problem List   Diagnosis    Hypertensive CKD (chronic kidney disease)    Mixed hyperlipidemia due to type 2 diabetes mellitus ([address] )    CAD (coronary artery disease)    Driving safety issue    History of CVA (cerebrovascular accident)    Paroxysmal atrial fibrillation (HCC)    Type 2 diabetes mellitus with stage 3b chronic kidney disease, without long-term current use of insulin ([address] )    Ambulatory dysfunction    Intermittent confusion    Other proteinuria    Vitamin D deficiency    Strain of lumbar region    Dementia ([address] )    Gait instability    Hearing loss     Past Medical History:   Diagnosis Date    Acute deep vein thrombosis (DVT) of brachial vein of left upper extremity ([address] ) 11/24/2017    Acute deep vein thrombosis (DVT) of left peroneal vein ([facility])     Acute ST elevation myocardial infarction [facility])     Aortic valve stenosis     Atrial fibrillation ([facility] )     Basilar artery stenosis     Basilar artery stenosis     Basilar artery stenosis 5/4/2020    MRA Head wo contrast (12/13/2019)  IMPRESSION:   Multifocal intracranial atherosclerotic disease with moderate to severe stenosis at the origin of the left M1 segment with additional atherosclerotic disease noted in the distal right M1 and proximal inferior left M2 branches    Moderate to severe stenosis in the proximal and mid basilar artery with nonvisualization of the right intradural vertebral    Benign prostatic hyperplasia with lower urinary tract symptoms     CAD (coronary artery disease)     CAD in native artery 11/24/2017    Underwent cardiac catheterization on 1/30/2020 and had mid and distal LAD stent placed  Cardiac PCI (1/30/2020)  SUMMARY   CORONARY CIRCULATION: Mid LAD: There was a 90 % stenosis at the site of a prior stent  Distal LAD: There was a 90 % stenosis at the site of a prior stent  Left posterior descending artery: There was a diffuse 50 % stenosis    1ST LESION INTERVENTIONS: A balloon angioplasty wit    Cerebrovascular small vessel disease 11/12/2020    Chronic kidney disease     Closed fracture of multiple ribs of left side with routine healing 9/3/2020    DM2 (diabetes mellitus, type 2) ([facility] )     Elevated troponin 7/19/2021    Herpes zoster without complication 2/95/3459    History of CVA (cerebrovascular accident) 2/21/2019    History of DVT of peroneal vein left lower extremity 12/16/2019    History of transfusion     HLD (hyperlipidemia)     HTN (hypertension)     Infected sebaceous cyst of skin 6/8/2021    Lump in chest 6/1/2021    Middle cerebral artery stenosis     Mild to moderate aortic stenosis 10/9/2018    ECHO (7/2020)  AORTIC VALVE: Leaflets exhibited moderately increased thickness and moderate calcification  Transaortic velocity was increased due to valvular stenosis  There was mild to moderate stenosis  RICHY 1 4cm, mean pressure gradient 11mmHg, peak velocity 2 15m/s There was mild regurgitation      Near syncope 7/19/2021    Other proteinuria 10/8/2019    Proteinuria     Rib fractures (right) 7/31/2020    Symptomatic bradycardia     Transient cerebral ischemia     Vitamin D deficiency      Social History     Socioeconomic History    Marital status:       Spouse name: Not on file    Number of children: Not on file    Years of education: Not on file    Highest education level: Not on file   Occupational History    Not on file   Tobacco Use    Smoking status: Never Smoker    Smokeless tobacco: Never Used   Vaping Use    Vaping Use: Never used   Substance and Sexual Activity    Alcohol use: Never    Drug use: No    Sexual activity: Not Currently     Partners: Female     Birth control/protection: None   Other Topics Concern    Not on file   Social History Narrative    Active advance directive (as per [employer])     No advance directive on file (as per [employer])      Social Determinants of Health     Financial Resource Strain: Not on file   Food Insecurity: Not on file   Transportation Needs: Not on file   Physical Activity: Not on file   Stress: Not on file   Social Connections: Not on file   Intimate Partner Violence: Not on file   Housing Stability: Not on file      Family History   Problem Relation Age of Onset    Emphysema Father     Emphysema Sister      Past Surgical History:   Procedure Laterality Date    CARDIAC CATHETERIZATION      Outcome: successful; last assessed: 02/03/2015    CARDIAC CATHETERIZATION  11/26/2019    CORONARY ANGIOPLASTY WITH STENT PLACEMENT  2008    stent to LAD     HAND SURGERY      thumb    HIP HARDWARE REMOVAL      TOTAL HIP ARTHROPLASTY Right     TOTAL HIP ARTHROPLASTY Bilateral        Current Outpatient Medications:     acetaminophen (TYLENOL) 500 mg tablet, Take 500 mg by mouth every 6 (six) hours as needed for mild pain, Disp: , Rfl:     amLODIPine (NORVASC) 10 mg tablet, Take 1 tablet (10 mg total) by mouth daily, Disp: 90 tablet, Rfl: 3    apixaban (ELIQUIS) 2 5 mg, Take 1 tablet (2 5 mg total) by mouth 2 (two) times a day, Disp: 180 tablet, Rfl: 3    Blood Glucose Monitoring Suppl (ONE TOUCH ULTRA MINI) w/Device KIT, Use daily, Disp: 1 kit, Rfl: 0    cholecalciferol (VITAMIN D3) 1,000 units tablet, Take 1,000 Units by mouth daily, Disp: , Rfl:     finasteride (PROSCAR) 5 mg tablet, Take 1 tablet (5 mg total) by mouth daily, Disp: 90 tablet, Rfl: 3    gabapentin (NEURONTIN) 100 mg capsule, Take 1 capsule (100 mg total) by mouth 2 (two) times a day, Disp: 180 capsule, Rfl: 3    glimepiride (AMARYL) 1 mg tablet, Take 1 tablet (1 mg total) by mouth daily with breakfast, Disp: 90 tablet, Rfl: 3    glucose blood (OneTouch Ultra) test strip, Check blood sugars once daily, Disp: 100 each, Rfl: 3    isosorbide dinitrate (ISORDIL) 10 mg tablet, Take 1 tablet (10 mg total) by mouth 2 (two) times a day, Disp: 180 tablet, Rfl: 3    lisinopril (ZESTRIL) 20 mg tablet, Take 1 tablet (20 mg total) by mouth daily, Disp: 90 tablet, Rfl: 3    metoprolol tartrate (LOPRESSOR) 25 mg tablet, Take 0 5 tablets (12 5 mg total) by mouth every 12 (twelve) hours, Disp: 90 tablet, Rfl: 3    Omega-3 Fatty Acids (FISH OIL CONCENTRATE PO), Take 1 tablet by mouth daily, Disp: , Rfl:     triamcinolone (KENALOG) 0 1 % cream, Apply topically 2 (two) times a day (Patient taking differently: Apply topically 2 (two) times a day as needed), Disp: 453 6 g, Rfl: 1    vitamin B-12 (CYANOCOBALAMIN) 100 MCG tablet, Take 1,000 mcg by mouth daily, Disp: , Rfl:     aspirin (ECOTRIN LOW STRENGTH) 81 mg EC tablet, Take 1 tablet (81 mg total) by mouth daily (Patient not taking: No sig reported), Disp: , Rfl:   Allergies   Allergen Reactions    Celecoxib Other (See Comments)     Per pt does NOT remember type of reaction or severity level    Hydromorphone Other (See Comments)     Per pt does NOT remember type of reaction or severity level    Pravastatin Hives    Statins Other (See Comments)     Per pt does NOT remember type of reaction or severity level       Labs:  Appointment on 07/06/2022   Component Date Value    Hemoglobin A1C 07/06/2022 6 5 (A)    EAG 07/06/2022 140     Sodium 07/06/2022 138     Potassium 07/06/2022 4 6     Chloride 07/06/2022 103     CO2 07/06/2022 27     ANION GAP 07/06/2022 8     BUN 07/06/2022 33 (A)    Creatinine 07/06/2022 1 91 (A)    Glucose, Fasting 07/06/2022 127 (A)    Calcium 07/06/2022 9 1     eGFR 07/06/2022 31     Cholesterol 07/06/2022 230 (A)    Triglycerides 07/06/2022 128     HDL, Direct 07/06/2022 53     LDL Calculated 07/06/2022 151 (A)    TSH 3RD GENERATON 07/06/2022 3 347     Vitamin B-12 07/06/2022 1,621 (A)    Folate 07/06/2022 >20 0 (A)     Imaging: No results found  Review of Systems:  Review of Systems   REVIEW OF SYSTEMS:  Constitutional:  Denies fever or chills   Eyes:  Denies change in visual acuity   HENT:  Denies nasal congestion or sore throat   Respiratory:  Denies cough or shortness of breath   Cardiovascular:  Denies chest pain or edema   GI:  Denies abdominal pain, nausea, vomiting, bloody stools or diarrhea   :  Denies dysuria, frequency, difficulty in micturition and nocturia  Musculoskeletal:  Denies back pain or joint pain   Neurologic:  Cognitive difficulty  Endocrine:  Denies polyuria or polydipsia   Lymphatic:  Denies swollen glands   Psychiatric:  Denies depression or anxiety     Physical Exam:    /80 (BP Location: Left arm, Patient Position: Sitting, Cuff Size: Standard)   Pulse 59   Ht 5' 9" (1 753 m)   Wt 98 1 kg (216 lb 3 2 oz)   SpO2 96%   BMI 31 93 kg/m²     Physical Exam   PHYSICAL EXAM:  General:  Patient is not in acute distress   Head: Normocephalic, Atraumatic  HEENT:  Both pupils normal-size atraumatic, normocephalic, nonicteric  Neck:  JVP not raised  Trachea central  No carotid bruit  Respiratory:  normal breath sounds no crackles   no rhonchi  Cardiovascular:  Regular rate and rhythm no S3 2/6 systolic ejection murmur in the right sternal border  GI:  Abdomen soft nontender  No organomegaly  Lymphatic:  No cervical or inguinal lymphadenopathy  Neurologic:  Patient is awake alert, oriented   Grossly nonfocal  Extremities no edema    Echocardiogram done last year showed normal ejection fraction with mild aortic stenosis    Discussion/Summary:  Patient with multiple medical problems who seems to be doing reasonably well from cardiac standpoint  Previous studies reviewed with patient  Medications reviewed and possible side effects discussed  concepts of cardiovascular disease , signs and symptoms of heart disease  Dietary and risk factor modification reinforced  All questions answered  Safety measures reviewed  Patient advised to report any problems prompting medical attention  Risks and benefits  and alternativesof anticoagulation to prevent thromboembolic risk from atrial fibrillation discussed at length  Patient to report any bleeding issues  Echocardiogram to reassess aortic stenosis prior to next visit  Overall conservative management based on advanced age and multiple comorbidities as well as cognitive difficulty  Medications reviewed  Patient does have CKD stable followed by Nephrology  Follow-up in 6 months or earlier as needed  Patient is agreeable with the plan of care  Also discussed with daughter

## 2022-11-07 DIAGNOSIS — I25.10 CORONARY ARTERY DISEASE INVOLVING NATIVE CORONARY ARTERY OF NATIVE HEART WITHOUT ANGINA PECTORIS: ICD-10-CM

## 2022-11-07 DIAGNOSIS — I48.0 PAROXYSMAL ATRIAL FIBRILLATION (HCC): ICD-10-CM

## 2022-11-08 ENCOUNTER — TELEPHONE (OUTPATIENT)
Dept: CARDIOLOGY CLINIC | Facility: CLINIC | Age: 87
End: 2022-11-08

## 2022-11-08 NOTE — TELEPHONE ENCOUNTER
Spoke to patient's daughter Ethan Villalta  Advised will leave signed application at reception desk for  at her earliest convenience  Daughter agreed

## 2022-11-30 DIAGNOSIS — G89.4 CHRONIC PAIN SYNDROME: ICD-10-CM

## 2022-11-30 RX ORDER — GABAPENTIN 100 MG/1
CAPSULE ORAL
Qty: 180 CAPSULE | Refills: 3 | Status: SHIPPED | OUTPATIENT
Start: 2022-11-30

## 2023-01-02 DIAGNOSIS — I10 ESSENTIAL HYPERTENSION: ICD-10-CM

## 2023-01-02 DIAGNOSIS — I25.10 CORONARY ARTERY DISEASE INVOLVING NATIVE CORONARY ARTERY OF NATIVE HEART WITHOUT ANGINA PECTORIS: ICD-10-CM

## 2023-01-02 RX ORDER — ISOSORBIDE DINITRATE 10 MG/1
TABLET ORAL
Qty: 180 TABLET | Refills: 3 | Status: SHIPPED | OUTPATIENT
Start: 2023-01-02

## 2023-01-03 RX ORDER — LISINOPRIL 20 MG/1
TABLET ORAL
Qty: 90 TABLET | Refills: 3 | Status: SHIPPED | OUTPATIENT
Start: 2023-01-03

## 2023-01-10 ENCOUNTER — APPOINTMENT (OUTPATIENT)
Dept: LAB | Facility: HOSPITAL | Age: 88
End: 2023-01-10

## 2023-01-10 DIAGNOSIS — N18.4 HYPERTENSIVE KIDNEY DISEASE WITH STAGE 4 CHRONIC KIDNEY DISEASE (HCC): ICD-10-CM

## 2023-01-10 DIAGNOSIS — E11.69 MIXED HYPERLIPIDEMIA DUE TO TYPE 2 DIABETES MELLITUS (HCC): Chronic | ICD-10-CM

## 2023-01-10 DIAGNOSIS — E78.2 MIXED HYPERLIPIDEMIA DUE TO TYPE 2 DIABETES MELLITUS (HCC): Chronic | ICD-10-CM

## 2023-01-10 DIAGNOSIS — I12.9 HYPERTENSIVE KIDNEY DISEASE WITH STAGE 4 CHRONIC KIDNEY DISEASE (HCC): ICD-10-CM

## 2023-01-10 DIAGNOSIS — N18.4 STAGE 4 CHRONIC KIDNEY DISEASE (HCC): ICD-10-CM

## 2023-01-10 DIAGNOSIS — R80.8 OTHER PROTEINURIA: ICD-10-CM

## 2023-01-10 DIAGNOSIS — E11.22 TYPE 2 DIABETES MELLITUS WITH STAGE 3B CHRONIC KIDNEY DISEASE, WITHOUT LONG-TERM CURRENT USE OF INSULIN (HCC): Chronic | ICD-10-CM

## 2023-01-10 DIAGNOSIS — N18.32 TYPE 2 DIABETES MELLITUS WITH STAGE 3B CHRONIC KIDNEY DISEASE, WITHOUT LONG-TERM CURRENT USE OF INSULIN (HCC): Chronic | ICD-10-CM

## 2023-01-10 DIAGNOSIS — E55.9 VITAMIN D DEFICIENCY: ICD-10-CM

## 2023-01-10 LAB
25(OH)D3 SERPL-MCNC: 32.7 NG/ML (ref 30–100)
ANION GAP SERPL CALCULATED.3IONS-SCNC: 9 MMOL/L (ref 4–13)
BACTERIA UR QL AUTO: ABNORMAL /HPF
BASOPHILS # BLD AUTO: 0.06 THOUSANDS/ÂΜL (ref 0–0.1)
BASOPHILS NFR BLD AUTO: 1 % (ref 0–1)
BILIRUB UR QL STRIP: NEGATIVE
BUN SERPL-MCNC: 33 MG/DL (ref 5–25)
CALCIUM SERPL-MCNC: 9.3 MG/DL (ref 8.3–10.1)
CHLORIDE SERPL-SCNC: 102 MMOL/L (ref 96–108)
CHOLEST SERPL-MCNC: 215 MG/DL
CLARITY UR: CLEAR
CO2 SERPL-SCNC: 28 MMOL/L (ref 21–32)
COLOR UR: ABNORMAL
CREAT SERPL-MCNC: 1.83 MG/DL (ref 0.6–1.3)
CREAT UR-MCNC: 68.4 MG/DL
EOSINOPHIL # BLD AUTO: 0.47 THOUSAND/ÂΜL (ref 0–0.61)
EOSINOPHIL NFR BLD AUTO: 6 % (ref 0–6)
ERYTHROCYTE [DISTWIDTH] IN BLOOD BY AUTOMATED COUNT: 13.1 % (ref 11.6–15.1)
GFR SERPL CREATININE-BSD FRML MDRD: 32 ML/MIN/1.73SQ M
GLUCOSE P FAST SERPL-MCNC: 110 MG/DL (ref 65–99)
GLUCOSE UR STRIP-MCNC: NEGATIVE MG/DL
HCT VFR BLD AUTO: 41.9 % (ref 36.5–49.3)
HDLC SERPL-MCNC: 57 MG/DL
HGB BLD-MCNC: 13.8 G/DL (ref 12–17)
HGB UR QL STRIP.AUTO: ABNORMAL
HYALINE CASTS #/AREA URNS LPF: ABNORMAL /LPF
IMM GRANULOCYTES # BLD AUTO: 0.02 THOUSAND/UL (ref 0–0.2)
IMM GRANULOCYTES NFR BLD AUTO: 0 % (ref 0–2)
KETONES UR STRIP-MCNC: NEGATIVE MG/DL
LDLC SERPL CALC-MCNC: 139 MG/DL (ref 0–100)
LEUKOCYTE ESTERASE UR QL STRIP: NEGATIVE
LYMPHOCYTES # BLD AUTO: 2.67 THOUSANDS/ÂΜL (ref 0.6–4.47)
LYMPHOCYTES NFR BLD AUTO: 34 % (ref 14–44)
MCH RBC QN AUTO: 31.7 PG (ref 26.8–34.3)
MCHC RBC AUTO-ENTMCNC: 32.9 G/DL (ref 31.4–37.4)
MCV RBC AUTO: 96 FL (ref 82–98)
MICROALBUMIN UR-MCNC: 1080 MG/L (ref 0–20)
MICROALBUMIN/CREAT 24H UR: 1579 MG/G CREATININE (ref 0–30)
MONOCYTES # BLD AUTO: 0.59 THOUSAND/ÂΜL (ref 0.17–1.22)
MONOCYTES NFR BLD AUTO: 8 % (ref 4–12)
NEUTROPHILS # BLD AUTO: 4.03 THOUSANDS/ÂΜL (ref 1.85–7.62)
NEUTS SEG NFR BLD AUTO: 51 % (ref 43–75)
NITRITE UR QL STRIP: NEGATIVE
NON-SQ EPI CELLS URNS QL MICRO: ABNORMAL /HPF
NONHDLC SERPL-MCNC: 158 MG/DL
NRBC BLD AUTO-RTO: 0 /100 WBCS
PH UR STRIP.AUTO: 6 [PH]
PHOSPHATE SERPL-MCNC: 4 MG/DL (ref 2.3–4.1)
PLATELET # BLD AUTO: 185 THOUSANDS/UL (ref 149–390)
PMV BLD AUTO: 9.3 FL (ref 8.9–12.7)
POTASSIUM SERPL-SCNC: 5 MMOL/L (ref 3.5–5.3)
PROT UR STRIP-MCNC: ABNORMAL MG/DL
PTH-INTACT SERPL-MCNC: 63.4 PG/ML (ref 18.4–80.1)
RBC # BLD AUTO: 4.35 MILLION/UL (ref 3.88–5.62)
RBC #/AREA URNS AUTO: ABNORMAL /HPF
SODIUM SERPL-SCNC: 139 MMOL/L (ref 135–147)
SP GR UR STRIP.AUTO: 1.01 (ref 1–1.03)
TRIGL SERPL-MCNC: 94 MG/DL
URATE SERPL-MCNC: 6.4 MG/DL (ref 3.5–8.5)
UROBILINOGEN UR STRIP-ACNC: <2 MG/DL
WBC # BLD AUTO: 7.84 THOUSAND/UL (ref 4.31–10.16)
WBC #/AREA URNS AUTO: ABNORMAL /HPF

## 2023-01-12 ENCOUNTER — RA CDI HCC (OUTPATIENT)
Dept: OTHER | Facility: HOSPITAL | Age: 88
End: 2023-01-12

## 2023-01-12 LAB
EST. AVERAGE GLUCOSE BLD GHB EST-MCNC: 134 MG/DL
HBA1C MFR BLD: 6.3 %

## 2023-01-12 NOTE — PROGRESS NOTES
[facility]  coding opportunities          Chart Reviewed number of suggestions sent to Provider: 2  E11 [id number]  E11 29, R80 9     Patients Insurance     Medicare Insurance: [employer]

## 2023-01-19 ENCOUNTER — OFFICE VISIT (OUTPATIENT)
Dept: INTERNAL MEDICINE CLINIC | Facility: CLINIC | Age: 88
End: 2023-01-19

## 2023-01-19 VITALS
DIASTOLIC BLOOD PRESSURE: 72 MMHG | SYSTOLIC BLOOD PRESSURE: 140 MMHG | BODY MASS INDEX: 30.81 KG/M2 | RESPIRATION RATE: 16 BRPM | WEIGHT: 208 LBS | OXYGEN SATURATION: 94 % | TEMPERATURE: 96.5 F | HEART RATE: 62 BPM | HEIGHT: 69 IN

## 2023-01-19 DIAGNOSIS — E11.22 TYPE 2 DIABETES MELLITUS WITH STAGE 3B CHRONIC KIDNEY DISEASE, WITHOUT LONG-TERM CURRENT USE OF INSULIN (HCC): Primary | Chronic | ICD-10-CM

## 2023-01-19 DIAGNOSIS — F03.90 DEMENTIA WITHOUT BEHAVIORAL DISTURBANCE (HCC): ICD-10-CM

## 2023-01-19 DIAGNOSIS — N18.32 TYPE 2 DIABETES MELLITUS WITH STAGE 3B CHRONIC KIDNEY DISEASE, WITHOUT LONG-TERM CURRENT USE OF INSULIN (HCC): Primary | Chronic | ICD-10-CM

## 2023-01-19 DIAGNOSIS — N18.32 HYPERTENSIVE KIDNEY DISEASE WITH STAGE 3B CHRONIC KIDNEY DISEASE (HCC): ICD-10-CM

## 2023-01-19 DIAGNOSIS — E78.2 MIXED HYPERLIPIDEMIA DUE TO TYPE 2 DIABETES MELLITUS (HCC): ICD-10-CM

## 2023-01-19 DIAGNOSIS — E11.69 MIXED HYPERLIPIDEMIA DUE TO TYPE 2 DIABETES MELLITUS (HCC): ICD-10-CM

## 2023-01-19 DIAGNOSIS — I12.9 HYPERTENSIVE KIDNEY DISEASE WITH STAGE 3B CHRONIC KIDNEY DISEASE (HCC): ICD-10-CM

## 2023-01-19 DIAGNOSIS — Z00.00 MEDICARE ANNUAL WELLNESS VISIT, SUBSEQUENT: ICD-10-CM

## 2023-01-19 DIAGNOSIS — L98.9 SKIN LESION: ICD-10-CM

## 2023-01-19 DIAGNOSIS — I48.0 PAROXYSMAL ATRIAL FIBRILLATION (HCC): ICD-10-CM

## 2023-01-19 PROBLEM — S39.012A STRAIN OF LUMBAR REGION: Status: RESOLVED | Noted: 2022-05-25 | Resolved: 2023-01-19

## 2023-01-19 PROBLEM — Z91.89 DRIVING SAFETY ISSUE: Status: RESOLVED | Noted: 2018-01-26 | Resolved: 2023-01-19

## 2023-01-19 PROBLEM — R41.0 INTERMITTENT CONFUSION: Status: RESOLVED | Noted: 2021-10-14 | Resolved: 2023-01-19

## 2023-01-19 NOTE — PATIENT INSTRUCTIONS
Medicare Preventive Visit Patient Instructions  Thank you for completing your Welcome to Medicare Visit or Medicare Annual Wellness Visit today  Your next wellness visit will be due in one year (1/20/2024)  The screening/preventive services that you may require over the next 5-10 years are detailed below  Some tests may not apply to you based off risk factors and/or age  Screening tests ordered at today's visit but not completed yet may show as past due  Also, please note that scanned in results may not display below  Preventive Screenings:  Service Recommendations Previous Testing/Comments   Colorectal Cancer Screening  Colonoscopy    Fecal Occult Blood Test (FOBT)/Fecal Immunochemical Test (FIT)  Fecal DNA/Cologuard Test  Flexible Sigmoidoscopy Age: 39-70 years old   Colonoscopy: every 10 years (May be performed more frequently if at higher risk)  OR  FOBT/FIT: every 1 year  OR  Cologuard: every 3 years  OR  Sigmoidoscopy: every 5 years  Screening may be recommended earlier than age 39 if at higher risk for colorectal cancer  Also, an individualized decision between you and your healthcare provider will decide whether screening between the ages of 74-80 would be appropriate   Colonoscopy: 05/08/2012  FOBT/FIT: Not on file  Cologuard: Not on file  Sigmoidoscopy: Not on file    Screening Not Indicated     Prostate Cancer Screening Individualized decision between patient and health care provider in men between ages of 53-78   Medicare will cover every 12 months beginning on the day after your 50th birthday PSA: 5 8 ng/mL     Screening Not Indicated     Hepatitis C Screening Once for adults born between 1945 and 1965  More frequently in patients at high risk for Hepatitis C Hep C Antibody: Not on file        Diabetes Screening 1-2 times per year if you're at risk for diabetes or have pre-diabetes Fasting glucose: 110 mg/dL (1/10/2023)  A1C: 6 3 % (1/10/2023)  Screening Not Indicated  History Diabetes   Cholesterol Screening Once every 5 years if you don't have a lipid disorder  May order more often based on risk factors  Lipid panel: 01/10/2023  Screening Not Indicated  History Lipid Disorder      Other Preventive Screenings Covered by Medicare:  Abdominal Aortic Aneurysm (AAA) Screening: covered once if your at risk  You're considered to be at risk if you have a family history of AAA or a male between the age of 73-68 who smoking at least 100 cigarettes in your lifetime  Lung Cancer Screening: covers low dose CT scan once per year if you meet all of the following conditions: (1) Age 50-69; (2) No signs or symptoms of lung cancer; (3) Current smoker or have quit smoking within the last 15 years; (4) You have a tobacco smoking history of at least 20 pack years (packs per day x number of years you smoked); (5) You get a written order from a healthcare provider  Glaucoma Screening: covered annually if you're considered high risk: (1) You have diabetes OR (2) Family history of glaucoma OR (3)  aged 48 and older OR (3)  American aged 72 and older  Osteoporosis Screening: covered every 2 years if you meet one of the following conditions: (1) Have a vertebral abnormality; (2) On glucocorticoid therapy for more than 3 months; (3) Have primary hyperparathyroidism; (4) On osteoporosis medications and need to assess response to drug therapy  HIV Screening: covered annually if you're between the age of 12-76  Also covered annually if you are younger than 13 and older than 72 with risk factors for HIV infection  For pregnant patients, it is covered up to 3 times per pregnancy      Immunizations:  Immunization Recommendations   Influenza Vaccine Annual influenza vaccination during flu season is recommended for all persons aged >= 6 months who do not have contraindications   Pneumococcal Vaccine   * Pneumococcal conjugate vaccine = PCV13 (Prevnar 13), PCV15 (Vaxneuvance), PCV20 (Prevnar 20)  * Pneumococcal polysaccharide vaccine = PPSV23 (Pneumovax) Adults 2364 years old: 1-3 doses may be recommended based on certain risk factors  Adults 72 years old: 1-2 doses may be recommended based off what pneumonia vaccine you previously received   Hepatitis B Vaccine 3 dose series if at intermediate or high risk (ex: diabetes, end stage renal disease, liver disease)   Tetanus (Td) Vaccine - COST NOT COVERED BY MEDICARE PART B Following completion of primary series, a booster dose should be given every 10 years to maintain immunity against tetanus  Td may also be given as tetanus wound prophylaxis  Tdap Vaccine - COST NOT COVERED BY MEDICARE PART B Recommended at least once for all adults  For pregnant patients, recommended with each pregnancy  Shingles Vaccine (Shingrix) - COST NOT COVERED BY MEDICARE PART B  2 shot series recommended in those aged 48 and above     Health Maintenance Due:      Topic Date Due    Colorectal Cancer Screening  Discontinued     Immunizations Due:      Topic Date Due    Pneumococcal Vaccine: 65+ Years (2 - PPSV23 if available, else PCV20) 12/18/2020    COVID-19 Vaccine (4 - Booster for Moderna series) 01/07/2022    Influenza Vaccine (1) 09/01/2022     Advance Directives   What are advance directives? Advance directives are legal documents that state your wishes and plans for medical care  These plans are made ahead of time in case you lose your ability to make decisions for yourself  Advance directives can apply to any medical decision, such as the treatments you want, and if you want to donate organs  What are the types of advance directives? There are many types of advance directives, and each state has rules about how to use them  You may choose a combination of any of the following:  Living will: This is a written record of the treatment you want  You can also choose which treatments you do not want, which to limit, and which to stop at a certain time   This includes surgery, medicine, IV fluid, and tube feedings  Durable power of  for healthcare [facility]): This is a written record that states who you want to make healthcare choices for you when you are unable to make them for yourself  This person, called a proxy, is usually a family member or a friend  You may choose more than 1 proxy  Do not resuscitate (DNR) order:  A DNR order is used in case your heart stops beating or you stop breathing  It is a request not to have certain forms of treatment, such as CPR  A DNR order may be included in other types of advance directives  Medical directive: This covers the care that you want if you are in a coma, near death, or unable to make decisions for yourself  You can list the treatments you want for each condition  Treatment may include pain medicine, surgery, blood transfusions, dialysis, IV or tube feedings, and a ventilator (breathing machine)  Values history: This document has questions about your views, beliefs, and how you feel and think about life  This information can help others choose the care that you would choose  Why are advance directives important? An advance directive helps you control your care  Although spoken wishes may be used, it is better to have your wishes written down  Spoken wishes can be misunderstood, or not followed  Treatments may be given even if you do not want them  An advance directive may make it easier for your family to make difficult choices about your care  Weight Management   Why it is important to manage your weight:  Being overweight increases your risk of health conditions such as heart disease, high blood pressure, type 2 diabetes, and certain types of cancer  It can also increase your risk for osteoarthritis, sleep apnea, and other respiratory problems  Aim for a slow, steady weight loss  Even a small amount of weight loss can lower your risk of health problems    How to lose weight safely:  A safe and healthy way to lose weight is to eat fewer calories and get regular exercise  You can lose up about 1 pound a week by decreasing the number of calories you eat by 500 calories each day  Healthy meal plan for weight management:  A healthy meal plan includes a variety of foods, contains fewer calories, and helps you stay healthy  A healthy meal plan includes the following:  Eat whole-grain foods more often  A healthy meal plan should contain fiber  Fiber is the part of grains, fruits, and vegetables that is not broken down by your body  Whole-grain foods are healthy and provide extra fiber in your diet  Some examples of whole-grain foods are whole-wheat breads and pastas, oatmeal, brown rice, and bulgur  Eat a variety of vegetables every day  Include dark, leafy greens such as spinach, kale, sang greens, and mustard greens  Eat yellow and orange vegetables such as carrots, sweet potatoes, and winter squash  Eat a variety of fruits every day  Choose fresh or canned fruit (canned in its own juice or light syrup) instead of juice  Fruit juice has very little or no fiber  Eat low-fat dairy foods  Drink fat-free (skim) milk or 1% milk  Eat fat-free yogurt and low-fat cottage cheese  Try low-fat cheeses such as mozzarella and other reduced-fat cheeses  Choose meat and other protein foods that are low in fat  Choose beans or other legumes such as split peas or lentils  Choose fish, skinless poultry (chicken or turkey), or lean cuts of red meat (beef or pork)  Before you cook meat or poultry, cut off any visible fat  Use less fat and oil  Try baking foods instead of frying them  Add less fat, such as margarine, sour cream, regular salad dressing and mayonnaise to foods  Eat fewer high-fat foods  Some examples of high-fat foods include french fries, doughnuts, ice cream, and cakes  Eat fewer sweets  Limit foods and drinks that are high in sugar  This includes candy, cookies, regular soda, and sweetened drinks    Exercise:  Exercise at least 30 minutes per day on most days of the week  Some examples of exercise include walking, biking, dancing, and swimming  You can also fit in more physical activity by taking the stairs instead of the elevator or parking farther away from stores  Ask your healthcare provider about the best exercise plan for you  © Copyright [employer] 2018 Information is for End User's use only and may not be sold, redistributed or otherwise used for commercial purposes   All illustrations and images included in CareNotes® are the copyrighted property of A [employer] A M , [employer]  or [address] [place], [place] [address]

## 2023-01-19 NOTE — PROGRESS NOTES
Assessment and Plan:     1  Type 2 diabetes mellitus with stage 3b chronic kidney disease, without long-term current use of insulin ([facility] )  2  Mixed hyperlipidemia due to type 2 diabetes mellitus ([facility] )    Most recent A1c was 6 3 % on 1/10/2023  Sugars are well controlled  No episodes of hypoglycemia and will continue him on amaryl  Cannot tolerate statins  Lab Units 01/10/23  0931 07/06/22  0852 06/08/22  0843   CREATININE mg/dL 1 83* 1 91* 1 99*   EGFR ml/min/1 73sq m 32 31 29     Stable renal function and follows with dr Lesley Wilson  Does have some worsening microalbuminuria and daughter will address with dr Lesley Wilson  3  Hypertensive kidney disease with stage 3b chronic kidney disease ([facility] )    Might need to intensify anti-HTN therapy  Would like to see BP <130/80  Has some worsening microalbuminuria  Will discuss with nephrology  4  Paroxysmal atrial fibrillation (HCC)    Continue low dose eliquis and lopressor  5  Dementia without behavioral disturbance (HCC)    Stable at this time  Has good family support  Encouraged him to use a cane  6  Skin lesion    Has benign appearing lesion under his right eye but has an irregular crated lesion on his nose  Would like him to evaluated for concerns over skin cancer     - Ambulatory referral to Plastic Surgery; Future    7  Medicare annual wellness visit, subsequent     BMI Counseling: Body mass index is 30 72 kg/m²  Follow-up plan was not completed due to elderly patient (74 years old) where weight reduction/weight gain would complicate underlying health condition such as: mental illness, dementia, or confusion  Depression Screening and Follow-up Plan: Patient was screened for depression during today's encounter  They screened negative with a PHQ-2 score of 0  Falls Plan of Care: balance, strength, and gait training instructions were provided  Recommended assistive device to help with gait and balance         Preventive health issues were discussed with patient, and age appropriate screening tests were ordered as noted in patient's After Visit Summary  Personalized health advice and appropriate referrals for health education or preventive services given if needed, as noted in patient's After Visit Summary  History of Present Illness:     Patient presents for a Medicare Wellness Visit    Continues to struggle with getting older and him just not being able to do the things he wants to do as he is limited by arthritis and overall decreased endurance  He has been told to use a cane and he is stubborn and has refused in the past  Did attend formal PT and didn't really feel that it helped him improve in any way  Patient Care Team:  Kevin Dasilva DO as PCP - General (Internal Medicine)  Spencer Silva MD as Consulting Physician  MD Neville Aguilera MD     Review of Systems:     Review of Systems   Constitutional: Positive for fatigue  Negative for chills and fever  Respiratory: Negative  Cardiovascular: Negative  Gastrointestinal: Negative  Musculoskeletal: Positive for arthralgias and gait problem        Problem List:     Patient Active Problem List   Diagnosis   • Hypertensive CKD (chronic kidney disease)   • Mixed hyperlipidemia due to type 2 diabetes mellitus ([facility] )   • CAD (coronary artery disease)   • History of CVA (cerebrovascular accident)   • Paroxysmal atrial fibrillation (HCC)   • Type 2 diabetes mellitus with stage 3b chronic kidney disease, without long-term current use of insulin ([facility] )   • Ambulatory dysfunction   • Other proteinuria   • Vitamin D deficiency   • Dementia ([facility] )   • Gait instability   • Hearing loss      Past Medical and Surgical History:     Past Medical History:   Diagnosis Date   • Acute deep vein thrombosis (DVT) of brachial vein of left upper extremity ([facility] ) 11/24/2017   • Acute deep vein thrombosis (DVT) of left peroneal vein (HCC)    • Acute ST elevation myocardial infarction [facility])    • Aortic valve stenosis    • Atrial fibrillation (HCC)    • Basilar artery stenosis    • Basilar artery stenosis    • Basilar artery stenosis 5/4/2020    MRA Head wo contrast (12/13/2019)  IMPRESSION:   Multifocal intracranial atherosclerotic disease with moderate to severe stenosis at the origin of the left M1 segment with additional atherosclerotic disease noted in the distal right M1 and proximal inferior left M2 branches    Moderate to severe stenosis in the proximal and mid basilar artery with nonvisualization of the right intradural vertebral   • Benign prostatic hyperplasia with lower urinary tract symptoms    • CAD (coronary artery disease)    • CAD in native artery 11/24/2017    Underwent cardiac catheterization on 1/30/2020 and had mid and distal LAD stent placed  Cardiac PCI (1/30/2020)  SUMMARY   CORONARY CIRCULATION: Mid LAD: There was a 90 % stenosis at the site of a prior stent  Distal LAD: There was a 90 % stenosis at the site of a prior stent  Left posterior descending artery: There was a diffuse 50 % stenosis    1ST LESION INTERVENTIONS: A balloon angioplasty wit   • Cerebrovascular small vessel disease 11/12/2020   • Chronic kidney disease    • Closed fracture of multiple ribs of left side with routine healing 9/3/2020   • DM2 (diabetes mellitus, type 2) ([facility] )    • Elevated troponin 7/19/2021   • Herpes zoster without complication 8/47/8779   • History of CVA (cerebrovascular accident) 2/21/2019   • History of DVT of peroneal vein left lower extremity 12/16/2019   • History of transfusion    • HLD (hyperlipidemia)    • HTN (hypertension)    • Infected sebaceous cyst of skin 6/8/2021   • Lump in chest 6/1/2021   • Middle cerebral artery stenosis    • Mild to moderate aortic stenosis 10/9/2018    ECHO (7/2020)  AORTIC VALVE: Leaflets exhibited moderately increased thickness and moderate calcification  Transaortic velocity was increased due to valvular stenosis  There was mild to moderate stenosis   RICHY 1 4cm, mean pressure gradient 11mmHg, peak velocity 2 15m/s There was mild regurgitation  • Near syncope 7/19/2021   • Other proteinuria 10/8/2019   • Proteinuria    • Rib fractures (right) 7/31/2020   • Symptomatic bradycardia    • Transient cerebral ischemia    • Vitamin D deficiency      Past Surgical History:   Procedure Laterality Date   • CARDIAC CATHETERIZATION      Outcome: successful; last assessed: 02/03/2015   • CARDIAC CATHETERIZATION  11/26/2019   • CORONARY ANGIOPLASTY WITH STENT PLACEMENT  2008    stent to LAD    • HAND SURGERY      thumb   • HIP HARDWARE REMOVAL     • TOTAL HIP ARTHROPLASTY Right    • TOTAL HIP ARTHROPLASTY Bilateral       Family History:     Family History   Problem Relation Age of Onset   • Emphysema Father    • Emphysema Sister       Social History:     Social History     Socioeconomic History   • Marital status:      Spouse name: None   • Number of children: None   • Years of education: None   • Highest education level: None   Occupational History   • None   Tobacco Use   • Smoking status: Never   • Smokeless tobacco: Never   Vaping Use   • Vaping Use: Never used   Substance and Sexual Activity   • Alcohol use: Never   • Drug use: No   • Sexual activity: Not Currently     Partners: Female     Birth control/protection: None   Other Topics Concern   • None   Social History Narrative    Active advance directive (as per [employer])     No advance directive on file (as per [employer])      Social Determinants of Health     Financial Resource Strain: Low Risk    • Difficulty of Paying Living Expenses: Not hard at all   Food Insecurity: Not on file   Transportation Needs: No Transportation Needs   • Lack of Transportation (Medical): No   • Lack of Transportation (Non-Medical):  No   Physical Activity: Not on file   Stress: Not on file   Social Connections: Not on file   Intimate Partner Violence: Not on file   Housing Stability: Not on file      Medications and Allergies:     Current Outpatient Medications   Medication Sig Dispense Refill   • acetaminophen (TYLENOL) 500 mg tablet Take 500 mg by mouth every 6 (six) hours as needed for mild pain     • amLODIPine (NORVASC) 10 mg tablet Take 1 tablet (10 mg total) by mouth daily 90 tablet 3   • apixaban (ELIQUIS) 2 5 mg Take 1 tablet (2 5 mg total) by mouth 2 (two) times a day 180 tablet 3   • aspirin (ECOTRIN LOW STRENGTH) 81 mg EC tablet Take 1 tablet (81 mg total) by mouth daily     • Blood Glucose Monitoring Suppl (ONE TOUCH ULTRA MINI) w/Device KIT Use daily 1 kit 0   • cholecalciferol (VITAMIN D3) 1,000 units tablet Take 1,000 Units by mouth daily     • finasteride (PROSCAR) 5 mg tablet Take 1 tablet (5 mg total) by mouth daily 90 tablet 3   • gabapentin (NEURONTIN) 100 mg capsule TAKE 1 CAPSULE BY MOUTH  TWICE DAILY 180 capsule 3   • glimepiride (AMARYL) 1 mg tablet Take 1 tablet (1 mg total) by mouth daily with breakfast 90 tablet 3   • glucose blood (OneTouch Ultra) test strip Check blood sugars once daily 100 each 3   • isosorbide dinitrate (ISORDIL) 10 mg tablet TAKE 1 TABLET BY MOUTH  TWICE DAILY 180 tablet 3   • lisinopril (ZESTRIL) 20 mg tablet TAKE 1 TABLET BY MOUTH  DAILY 90 tablet 3   • metoprolol tartrate (LOPRESSOR) 25 mg tablet TAKE ONE-HALF TABLET BY  MOUTH EVERY 12 HOURS 90 tablet 3   • Omega-3 Fatty Acids (FISH OIL CONCENTRATE PO) Take 1 tablet by mouth daily     • vitamin B-12 (CYANOCOBALAMIN) 100 MCG tablet Take 1,000 mcg by mouth daily       No current facility-administered medications for this visit       Allergies   Allergen Reactions   • Celecoxib Other (See Comments)     Per pt does NOT remember type of reaction or severity level   • Hydromorphone Other (See Comments)     Per pt does NOT remember type of reaction or severity level   • Pravastatin Hives   • Statins Other (See Comments)     Per pt does NOT remember type of reaction or severity level      Immunizations:     Immunization History   Administered Date(s) Administered   • COVID-19 MODERNA VACC 0 5 ML IM 02/08/2021, 03/06/2021   • COVID-19 PFIZER VACCINE 0 3 ML IM 11/12/2021   • INFLUENZA 03/04/2019, 12/18/2019   • Influenza, high dose seasonal 0 7 mL 11/12/2020   • Influenza, seasonal, injectable 1935   • Pneumococcal Conjugate 13-Valent 12/04/2018, 12/18/2019   • Pneumococcal Polysaccharide PPV23 1935, 1935   • Tdap 1935   • Zoster 1935   • influenza, trivalent, adjuvanted 12/18/2019      Health Maintenance:         Topic Date Due   • Colorectal Cancer Screening  Discontinued         Topic Date Due   • Pneumococcal Vaccine: 65+ Years (2 - PPSV23 if available, else PCV20) 12/18/2020   • COVID-19 Vaccine (4 - Booster for [place] [employer] series) 01/07/2022   • Influenza Vaccine (1) 09/01/2022      Medicare Screening Tests and Risk Assessments:     Ti Freeman is here for his Subsequent Wellness visit  Last Medicare Wellness visit information reviewed, patient interviewed and updates made to the record today  Health Risk Assessment:   Patient rates overall health as poor  Patient feels that their physical health rating is slightly worse  Patient is satisfied with their life  Eyesight was rated as same  Hearing was rated as same  Patient feels that their emotional and mental health rating is same  Patients states they are never, rarely angry  Patient states they are sometimes unusually tired/fatigued  Pain experienced in the last 7 days has been some  Patient's pain rating has been 9/10  Patient states that he has experienced no weight loss or gain in last 6 months  Depression Screening:   PHQ-2 Score: 0      Fall Risk Screening: In the past year, patient has experienced: history of falling in past year    Number of falls: 2 or more  Injured during fall?: Yes    Feels unsteady when standing or walking?: Yes    Worried about falling?: Yes      Home Safety:  Patient has trouble with stairs inside or outside of their home   Patient has working smoke alarms and has working carbon monoxide detector  Home safety hazards include: loose rugs on the floor  Nutrition:   Current diet is Regular  Medications:   Patient is currently taking over-the-counter supplements  OTC medications include: see medication list  Patient is not able to manage medications  Activities of Daily Living (ADLs)/Instrumental Activities of Daily Living (IADLs):   Walk and transfer into and out of bed and chair?: Yes  Dress and groom yourself?: Yes    Bathe or shower yourself?: Yes    Feed yourself? Yes  Do your laundry/housekeeping?: Yes  Manage your money, pay your bills and track your expenses?: No  Make your own meals?: No    Do your own shopping?: Yes    Previous Hospitalizations:   Any hospitalizations or ED visits within the last 12 months?: No      Advance Care Planning:   Living will: Yes    Durable POA for healthcare: Yes    Advanced directive: Yes    Five wishes given: No      Cognitive Screening:   Provider or family/friend/caregiver concerned regarding cognition?: Yes    Cognition Comments: Has known dementia    PREVENTIVE SCREENINGS      Cardiovascular Screening:    General: Screening Not Indicated and History Lipid Disorder      Diabetes Screening:     General: Screening Not Indicated and History Diabetes      Colorectal Cancer Screening:     General: Screening Not Indicated      Prostate Cancer Screening:    General: Screening Not Indicated      Osteoporosis Screening:    General: Screening Not Indicated      Abdominal Aortic Aneurysm (AAA) Screening:        General: Screening Not Indicated      Lung Cancer Screening:     General: Screening Not Indicated      Hepatitis C Screening:    General: Screening Not Indicated    Screening, Brief Intervention, and Referral to Treatment (SBIRT)    Screening  Typical number of drinks in a day: 0  Typical number of drinks in a week: 0  Interpretation: Low risk drinking behavior      AUDIT-C Screenin) How often did you have a drink containing alcohol in the past year? never  2) How many drinks did you have on a typical day when you were drinking in the past year? 0  3) How often did you have 6 or more drinks on one occasion in the past year? never    AUDIT-C Score: 0  Interpretation: Score 0-3 (male): Negative screen for alcohol misuse    Single Item Drug Screening:  How often have you used an illegal drug (including marijuana) or a prescription medication for non-medical reasons in the past year? never    Single Item Drug Screen Score: 0  Interpretation: Negative screen for possible drug use disorder    Brief Intervention  Alcohol & drug use screenings were reviewed  No concerns regarding substance use disorder identified  Other Counseling Topics:   Car/seat belt/driving safety, skin self-exam, sunscreen and regular weightbearing exercise  Physical Exam:     /72 (BP Location: Left arm, Patient Position: Sitting, Cuff Size: Large)   Pulse 62   Temp (!) 96 5 °F (35 8 °C) (Tympanic)   Resp 16   Ht 5' 9" (1 753 m)   Wt 94 3 kg (208 lb)   SpO2 94%   BMI 30 72 kg/m²     Physical Exam  Constitutional:       General: He is not in acute distress  Appearance: He is not ill-appearing  Cardiovascular:      Rate and Rhythm: Normal rate and regular rhythm  Heart sounds: Murmur heard  Pulmonary:      Effort: Pulmonary effort is normal  No respiratory distress  Breath sounds: No wheezing  Abdominal:      General: Bowel sounds are normal  There is no distension  Tenderness: There is no abdominal tenderness  Musculoskeletal:      Right lower leg: No edema  Left lower leg: No edema  Skin:     Findings: Lesion (well circumscribed cystic lesion under right eye; lesion on the bridge of the nose with black eschar and irregular borders/cratering) present  Neurological:      Mental Status: He is alert        Gait: Gait abnormal         Beto Garnett DO

## 2023-02-06 DIAGNOSIS — E11.22 TYPE 2 DIABETES MELLITUS WITH STAGE 3B CHRONIC KIDNEY DISEASE, WITHOUT LONG-TERM CURRENT USE OF INSULIN (HCC): Primary | ICD-10-CM

## 2023-02-06 DIAGNOSIS — N18.32 TYPE 2 DIABETES MELLITUS WITH STAGE 3B CHRONIC KIDNEY DISEASE, WITHOUT LONG-TERM CURRENT USE OF INSULIN (HCC): Primary | ICD-10-CM

## 2023-02-06 RX ORDER — GLIPIZIDE 2.5 MG/1
2.5 TABLET, EXTENDED RELEASE ORAL DAILY
Qty: 90 TABLET | Refills: 1 | Status: SHIPPED | OUTPATIENT
Start: 2023-02-06 | End: 2023-08-05

## 2023-02-06 NOTE — TELEPHONE ENCOUNTER
Patient [facility] is out of   glimepiride (AMARYL) 1 mg    Not sure when they will be restock    Requesting a replacement

## 2023-02-13 ENCOUNTER — TELEPHONE (OUTPATIENT)
Dept: CARDIOLOGY CLINIC | Facility: CLINIC | Age: 88
End: 2023-02-13

## 2023-02-13 NOTE — TELEPHONE ENCOUNTER
Received fax from [employer] (app)   Patient [facility]  Patient was approve for Eliquis  Scanned into chart

## 2023-02-16 ENCOUNTER — TELEPHONE (OUTPATIENT)
Dept: NEPHROLOGY | Facility: CLINIC | Age: 88
End: 2023-02-16

## 2023-02-16 NOTE — TELEPHONE ENCOUNTER
I called [employer] @ [phone] and spoke to Pam Garcia () to check eligible for the patient and as of 2/16/2023 the patient has current active coverage as of 1/1/2012  This patient plan is a Medicare Supplement Plan N that follows Medicare Guide Lines; if Medicare pays then [employer] pays; if [employer] denies  then Medicare denies  The reference number for this plan is [id number]    Shelby Mcrae,

## 2023-02-21 ENCOUNTER — OFFICE VISIT (OUTPATIENT)
Dept: NEPHROLOGY | Facility: CLINIC | Age: 88
End: 2023-02-21

## 2023-02-21 VITALS
HEIGHT: 69 IN | RESPIRATION RATE: 16 BRPM | HEART RATE: 56 BPM | WEIGHT: 220 LBS | BODY MASS INDEX: 32.58 KG/M2 | DIASTOLIC BLOOD PRESSURE: 70 MMHG | TEMPERATURE: 97.2 F | SYSTOLIC BLOOD PRESSURE: 150 MMHG | OXYGEN SATURATION: 95 %

## 2023-02-21 DIAGNOSIS — N18.32 HYPERTENSIVE KIDNEY DISEASE WITH STAGE 3B CHRONIC KIDNEY DISEASE (HCC): ICD-10-CM

## 2023-02-21 DIAGNOSIS — I12.9 HYPERTENSIVE KIDNEY DISEASE WITH STAGE 3B CHRONIC KIDNEY DISEASE (HCC): ICD-10-CM

## 2023-02-21 DIAGNOSIS — E55.9 VITAMIN D DEFICIENCY: ICD-10-CM

## 2023-02-21 DIAGNOSIS — R80.8 OTHER PROTEINURIA: ICD-10-CM

## 2023-02-21 DIAGNOSIS — N18.32 STAGE 3B CHRONIC KIDNEY DISEASE (HCC): Primary | ICD-10-CM

## 2023-02-21 NOTE — PROGRESS NOTES
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  Patient's daughter-Barrie was also present at the time of consultation and history was also obtained from her and all the questions were answered  Upon review of old medical records patient's new baseline creatinine seems to be fluctuating around 1 9-2 1  Patient and his daughter has attended [facility] class in the past and looking at overall clinical picture, patient would not be ideal candidate for home dialysis and would consider deferring patient to AV fistula creation once GFR goes below 20  Patient has underlying coronary artery disease and had underwent cardiac catheterization on 1/30/2020 and had mid and distal LAD stent placed  Patient continued to have shortness of breath with minimal activity and currently been followed by cardiologist and was told that he will not get better in the future due to underlying heart condition  Patient fell yesterday and complains of pain in right upper quadrant area and now taking as needed Tylenol  Patient has hypertension and has been compliant with his antihypertensive medications  According to patient he has been checking blood pressure on regular basis at home and his blood pressure at home is under well control  Patient had underwent renal artery Doppler in March 2020 which also showed > 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 and has refused to see vascular surgeon in the past   Patient does not have any abdominal symptoms either      Patient has underlying diabetes type 2 and according to patient's daughter, diabetes seems to be under good control at this point   Patient was also previously found to have proteinuria which was suspected due to underlying diabetic nephropathy on top of hypertensive nephrosclerosis  Patient takes lisinopril  Patient was also found to have vitamin-D deficiency and currently taking cholecalciferol 1000 Units PO daily  Patient also have back pain which was suspected due to arthritis and now been followed by physical therapist     Patient takes all the medications as prescribed and denies use of NSAIDs  REVIEW OF SYSTEMS:    Review of Systems   Constitutional: Negative for chills and fever  HENT: Negative for nosebleeds and sore throat  Eyes: Negative for photophobia and pain  Respiratory: Negative for choking and wheezing  Cardiovascular: Negative for chest pain and palpitations  Gastrointestinal: Negative for blood in stool  Genitourinary: Negative for hematuria  Musculoskeletal: Negative for neck stiffness  Skin: Negative for pallor  Neurological: Negative for seizures and syncope  Psychiatric/Behavioral: Negative for confusion and suicidal ideas           PAST MEDICAL HISTORY:  Past Medical History:   Diagnosis Date   • Acute deep vein thrombosis (DVT) of brachial vein of left upper extremity ([facility] ) 11/24/2017   • Acute deep vein thrombosis (DVT) of left peroneal vein ([facility])    • Acute ST elevation myocardial infarction [facility])    • Aortic valve stenosis    • Atrial fibrillation (HCC)    • Basilar artery stenosis    • Basilar artery stenosis    • Basilar artery stenosis 5/4/2020    MRA Head wo contrast (12/13/2019)  IMPRESSION:   Multifocal intracranial atherosclerotic disease with moderate to severe stenosis at the origin of the left M1 segment with additional atherosclerotic disease noted in the distal right M1 and proximal inferior left M2 branches    Moderate to severe stenosis in the proximal and mid basilar artery with nonvisualization of the right intradural vertebral   • Benign prostatic hyperplasia with lower urinary tract symptoms    • CAD (coronary artery disease)    • CAD in native artery 11/24/2017    Underwent cardiac catheterization on 1/30/2020 and had mid and distal LAD stent placed  Cardiac PCI (1/30/2020)  SUMMARY   CORONARY CIRCULATION: Mid LAD: There was a 90 % stenosis at the site of a prior stent  Distal LAD: There was a 90 % stenosis at the site of a prior stent  Left posterior descending artery: There was a diffuse 50 % stenosis    1ST LESION INTERVENTIONS: A balloon angioplasty wit   • Cerebrovascular small vessel disease 11/12/2020   • Chronic kidney disease    • Closed fracture of multiple ribs of left side with routine healing 9/3/2020   • DM2 (diabetes mellitus, type 2) [facility]    • Elevated troponin 7/19/2021   • Herpes zoster without complication 4/29/7976   • History of CVA (cerebrovascular accident) 2/21/2019   • History of DVT of peroneal vein left lower extremity 12/16/2019   • History of transfusion    • HLD (hyperlipidemia)    • HTN (hypertension)    • Infected sebaceous cyst of skin 6/8/2021   • Lump in chest 6/1/2021   • Middle cerebral artery stenosis    • Mild to moderate aortic stenosis 10/9/2018    ECHO (7/2020)  AORTIC VALVE: Leaflets exhibited moderately increased thickness and moderate calcification  Transaortic velocity was increased due to valvular stenosis  There was mild to moderate stenosis  RICHY 1 4cm, mean pressure gradient 11mmHg, peak velocity 2 15m/s There was mild regurgitation     • Near syncope 7/19/2021   • Other proteinuria 10/8/2019   • Proteinuria    • Rib fractures (right) 7/31/2020   • Symptomatic bradycardia    • Transient cerebral ischemia    • Vitamin D deficiency        PAST SURGICAL HISTORY:  Past Surgical History:   Procedure Laterality Date   • CARDIAC CATHETERIZATION      Outcome: successful; last assessed: 02/03/2015   • CARDIAC CATHETERIZATION  11/26/2019   • CORONARY ANGIOPLASTY WITH STENT PLACEMENT  2008    stent to LAD    • HAND SURGERY      thumb   • HIP HARDWARE REMOVAL     • TOTAL HIP ARTHROPLASTY Right    • TOTAL HIP ARTHROPLASTY Bilateral        SOCIAL HISTORY:  Social History     Substance and Sexual Activity   Alcohol Use Never     Social History     Substance and Sexual Activity   Drug Use No     Social History     Tobacco Use   Smoking Status Never   Smokeless Tobacco Never       FAMILY HISTORY:  Family History   Problem Relation Age of Onset   • Emphysema Father    • Emphysema Sister        ALLERGY:  Allergies   Allergen Reactions   • Celecoxib Other (See Comments)     Per pt does NOT remember type of reaction or severity level   • Hydromorphone Other (See Comments)     Per pt does NOT remember type of reaction or severity level   • Pravastatin Hives   • Statins Other (See Comments)     Per pt does NOT remember type of reaction or severity level       MEDICATIONS:    Current Outpatient Medications:   •  acetaminophen (TYLENOL) 500 mg tablet, Take 500 mg by mouth every 6 (six) hours as needed for mild pain, Disp: , Rfl:   •  amLODIPine (NORVASC) 10 mg tablet, Take 1 tablet (10 mg total) by mouth daily, Disp: 90 tablet, Rfl: 3  •  apixaban (ELIQUIS) 2 5 mg, Take 1 tablet (2 5 mg total) by mouth 2 (two) times a day, Disp: 180 tablet, Rfl: 3  •  aspirin (ECOTRIN LOW STRENGTH) 81 mg EC tablet, Take 1 tablet (81 mg total) by mouth daily, Disp: , Rfl:   •  Blood Glucose Monitoring Suppl (ONE TOUCH ULTRA MINI) w/Device KIT, Use daily, Disp: 1 kit, Rfl: 0  •  cholecalciferol (VITAMIN D3) 1,000 units tablet, Take 1,000 Units by mouth daily, Disp: , Rfl:   •  finasteride (PROSCAR) 5 mg tablet, Take 1 tablet (5 mg total) by mouth daily, Disp: 90 tablet, Rfl: 3  •  gabapentin (NEURONTIN) 100 mg capsule, TAKE 1 CAPSULE BY MOUTH  TWICE DAILY, Disp: 180 capsule, Rfl: 3  •  glipiZIDE (GLUCOTROL XL) 2 5 mg 24 hr tablet, Take 1 tablet (2 5 mg total) by mouth daily, Disp: 90 tablet, Rfl: 1  •  glucose blood (OneTouch Ultra) test strip, Check blood sugars once daily, Disp: 100 each, Rfl: 3  •  isosorbide dinitrate (ISORDIL) 10 mg tablet, TAKE 1 TABLET BY MOUTH  TWICE DAILY, Disp: 180 tablet, Rfl: 3  •  lisinopril (ZESTRIL) 20 mg tablet, TAKE 1 TABLET BY MOUTH  DAILY, Disp: 90 tablet, Rfl: 3  •  metoprolol tartrate (LOPRESSOR) 25 mg tablet, TAKE ONE-HALF TABLET BY  MOUTH EVERY 12 HOURS, Disp: 90 tablet, Rfl: 3  •  Omega-3 Fatty Acids (FISH OIL CONCENTRATE PO), Take 1 tablet by mouth daily, Disp: , Rfl:   •  vitamin B-12 (CYANOCOBALAMIN) 100 MCG tablet, Take 1,000 mcg by mouth daily, Disp: , Rfl:     PHYSICAL EXAM:  Vitals:    02/21/23 0853   BP: 150/70   BP Location: Left arm   Patient Position: Sitting   Cuff Size: Standard   Pulse: 56   Resp: 16   Temp: (!) 97 2 °F (36 2 °C)   TempSrc: Temporal   SpO2: 95%   Weight: 99 8 kg (220 lb)   Height: 5' 9" (1 753 m)     Body mass index is 32 49 kg/m²  Physical Exam  Constitutional:       General: He is not in acute distress  HENT:      Right Ear: External ear normal    Eyes:      General: No scleral icterus  Conjunctiva/sclera:      Right eye: No hemorrhage  Neck:      Thyroid: No thyromegaly  Vascular: No JVD  Cardiovascular:      Rate and Rhythm: Normal rate  Heart sounds: Murmur heard  Pulmonary:      Effort: Pulmonary effort is normal  No accessory muscle usage or respiratory distress  Breath sounds: No wheezing  Abdominal:      General: There is no distension  Musculoskeletal:      Right ankle: No swelling  Left ankle: No swelling  Skin:     General: Skin is warm  Coloration: Skin is not jaundiced  Neurological:      Mental Status: He is alert  He is not disoriented  Psychiatric:         Speech: He is communicative  Behavior: Behavior is not combative           LAB RESULTS:  Results for orders placed or performed in visit on 01/10/23   Hemoglobin A1C   Result Value Ref Range Hemoglobin A1C 6 3 (H) Normal 3 8-5 6%; PreDiabetic 5 7-6 4%;  Diabetic >=6 5%; Glycemic control for adults with diabetes <7 0% %     mg/dl   Lipid panel   Result Value Ref Range    Cholesterol 215 (H) See Comment mg/dL    Triglycerides 94 See Comment mg/dL    HDL, Direct 57 >=40 mg/dL    LDL Calculated 139 (H) 0 - 100 mg/dL    Non-HDL-Chol (CHOL-HDL) 158 mg/dl   CBC and differential   Result Value Ref Range    WBC 7 84 4 31 - 10 16 Thousand/uL    RBC 4 35 3 88 - 5 62 Million/uL    Hemoglobin 13 8 12 0 - 17 0 g/dL    Hematocrit 41 9 36 5 - 49 3 %    MCV 96 82 - 98 fL    MCH 31 7 26 8 - 34 3 pg    MCHC 32 9 31 4 - 37 4 g/dL    RDW 13 1 11 6 - 15 1 %    MPV 9 3 8 9 - 12 7 fL    Platelets 017 258 - 304 Thousands/uL    nRBC 0 /100 WBCs    Neutrophils Relative 51 43 - 75 %    Immat GRANS % 0 0 - 2 %    Lymphocytes Relative 34 14 - 44 %    Monocytes Relative 8 4 - 12 %    Eosinophils Relative 6 0 - 6 %    Basophils Relative 1 0 - 1 %    Neutrophils Absolute 4 03 1 85 - 7 62 Thousands/µL    Immature Grans Absolute 0 02 0 00 - 0 20 Thousand/uL    Lymphocytes Absolute 2 67 0 60 - 4 47 Thousands/µL    Monocytes Absolute 0 59 0 17 - 1 22 Thousand/µL    Eosinophils Absolute 0 47 0 00 - 0 61 Thousand/µL    Basophils Absolute 0 06 0 00 - 0 10 Thousands/µL   Basic metabolic panel   Result Value Ref Range    Sodium 139 135 - 147 mmol/L    Potassium 5 0 3 5 - 5 3 mmol/L    Chloride 102 96 - 108 mmol/L    CO2 28 21 - 32 mmol/L    ANION GAP 9 4 - 13 mmol/L    BUN 33 (H) 5 - 25 mg/dL    Creatinine 1 83 (H) 0 60 - 1 30 mg/dL    Glucose, Fasting 110 (H) 65 - 99 mg/dL    Calcium 9 3 8 3 - 10 1 mg/dL    eGFR 32 ml/min/1 73sq m   Urinalysis with microscopic   Result Value Ref Range    Color, UA Light Yellow     Clarity, UA Clear     Specific [place], UA 1 013 1 003 - 1 030    pH, UA 6 0 4 5, 5 0, 5 5, 6 0, 6 5, 7 0, 7 5, 8 0    Leukocytes, UA Negative Negative    Nitrite, UA Negative Negative    Protein,  (2+) (A) Negative mg/dl Glucose, UA Negative Negative mg/dl    Ketones, UA Negative Negative mg/dl    Urobilinogen, UA <2 0 <2 0 mg/dl mg/dl    Bilirubin, UA Negative Negative    Occult Blood, UA Trace (A) Negative    RBC, UA 1-2 None Seen, 1-2 /hpf    WBC, UA 1-2 None Seen, 1-2 /hpf    Epithelial Cells Occasional None Seen, Occasional /hpf    Bacteria, UA None Seen None Seen, Occasional /hpf    Hyaline Casts, UA 3-5 (A) None Seen /lpf   Microalbumin / creatinine urine ratio   Result Value Ref Range    Creatinine, Ur 68 4 mg/dL    Microalbum  ,U,Random 1,080 0 (H) 0 0 - 20 0 mg/L    Microalb Creat Ratio 1,579 (H) 0 - 30 mg/g creatinine   Phosphorus   Result Value Ref Range    Phosphorus 4 0 2 3 - 4 1 mg/dL   Uric acid   Result Value Ref Range    Uric Acid 6 4 3 5 - 8 5 mg/dL   PTH, intact   Result Value Ref Range    PTH 63 4 18 4 - 80 1 pg/mL   Vitamin D 25 hydroxy   Result Value Ref Range    Vit D, 25-Hydroxy 32 7 30 0 - 100 0 ng/mL       ASSESSMENT and PLAN:  Angelita Peña was seen today for chronic kidney disease and follow-up  Diagnoses and all orders for this visit:    Stage 3b chronic kidney disease ([facility] )  -     CBC and differential; Future  -     Basic metabolic panel; Future  -     Urinalysis with microscopic; Future  -     Microalbumin / creatinine urine ratio; Future  -     Phosphorus; Future  -     Uric acid; Future  -     PTH, intact; Future  -     Vitamin D 25 hydroxy; Future    Hypertensive kidney disease with stage 3b [facility] ([facility] )  -     Basic metabolic panel; Future  -     Urinalysis with microscopic; Future  -     Microalbumin / creatinine urine ratio; Future    Other proteinuria  -     Urinalysis with microscopic; Future  -     Microalbumin / creatinine urine ratio; Future    Vitamin D deficiency  -     Vitamin D 25 hydroxy; Future      1  CKD stage 3-4  Multifactorial and suspected due to underlying diabetic nephropathy on top of hypertensive nephrosclerosis plus advanced age      Upon review of old medical records, patient's new baseline creatinine seems to be fluctuating around 1 9-2 1 and in the interim renal function has remained close to baseline with current creatinine of 1 83 with a EGFR of 32  Patient seems to have memory issue and overall case was discussed in length with patient's daughter today including possibility of dialysis in the future if renal function worsen and GFR goes below 15  Looking at overall clinical picture, patient would not be ideal candidate for home modality and would consider incenter hemodialysis  Patient's daughter is also leaning towards of possibility of in center hemodialysis when indicated  Management of diabetes and hypertension as mentioned below  Continue to avoid NSAIDs as and recheck renal function before next visit  2  Hypertension in chronic kidney disease  Today's blood pressure was elevated but acceptable for age and for time being plan to monitor hypertension with metoprolol 12 5 mg PO BID, isosorbide 10 mg PO BID, amlodipine 10 mg PO daily and lisinopril 20 mg PO daily today  Advised to follow low-salt diet  Patient had underwent renal artery Doppler on March 2020 and it was difficult to visualize renal artery due to bowel gas pattern  Patient was also found to have diffuse disease in left renal artery but patent  Patient was also found to have > 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 and has refused to see vascular surgeon in the past     3  Diabetes type 2 in chronic kidney disease  Goal Hb A1c would be < 7% for underlying chronic kidney disease  Continue to avoid metformin  Currently patient is taking glimepiride defer further management of diabetes to PCP  4  Proteinuria  Multifactorial, suspected due to underlying diabetic nephropathy on top of hypertensive nephrosclerosis  Current urine microalbumin to creatinine ratio is 1 579 g  Continue lisinopril 20 mg p o  daily and recheck proteinuria with the next visit      5  Vitamin-D deficiency  Currently taking cholecalciferol 1000 Units PO daily  Current vitamin D level is 32 which is at goal and plan to continue cholecalciferol at current dose and recheck vitamin D level with the next visit  Returns to Nephrology office in 6 months  Future labs ordered  Portions of the record may have been created with voice recognition software  Occasional wrong word or "sound a like" substitutions may have occurred due to the inherent limitations of voice recognition software  Read the chart carefully and recognize, using context, where substitutions have occurred  If you have any questions, please contact the dictating provider

## 2023-03-13 ENCOUNTER — HOSPITAL ENCOUNTER (OUTPATIENT)
Dept: NON INVASIVE DIAGNOSTICS | Facility: CLINIC | Age: 88
Discharge: HOME | End: 2023-03-13

## 2023-03-13 VITALS
HEART RATE: 61 BPM | HEIGHT: 69 IN | WEIGHT: 220 LBS | SYSTOLIC BLOOD PRESSURE: 140 MMHG | DIASTOLIC BLOOD PRESSURE: 72 MMHG | BODY MASS INDEX: 32.58 KG/M2

## 2023-03-13 DIAGNOSIS — I35.0 NONRHEUMATIC AORTIC VALVE STENOSIS: ICD-10-CM

## 2023-03-13 LAB
AORTIC ROOT: 3.9 CM
AORTIC VALVE MEAN VELOCITY: 19.9 M/S
APICAL FOUR CHAMBER EJECTION FRACTION: 66 %
AV AREA BY CONTINUOUS VTI: 1.4 CM2
AV AREA PEAK VELOCITY: 1.3 CM2
AV LVOT MEAN GRADIENT: 1 MMHG
AV LVOT PEAK GRADIENT: 2 MMHG
AV MEAN GRADIENT: 17 MMHG
AV PEAK GRADIENT: 29 MMHG
AV REGURGITATION PRESSURE HALF TIME: 577 MS
AV VALVE AREA: 1.39 CM2
AV VELOCITY RATIO: 0.29
DOP CALC AO PEAK VEL: 2.66 M/S
DOP CALC AO VTI: 61.69 CM
DOP CALC LVOT AREA: 4.52 CM2
DOP CALC LVOT DIAMETER: 2.4 CM
DOP CALC LVOT PEAK VEL VTI: 18.92 CM
DOP CALC LVOT PEAK VEL: 0.76 M/S
DOP CALC LVOT STROKE INDEX: 38.6 ML/M2
DOP CALC LVOT STROKE VOLUME: 85.55 CM3
FRACTIONAL SHORTENING: 25 % (ref 28–44)
INTERVENTRICULAR SEPTUM IN DIASTOLE (PARASTERNAL SHORT AXIS VIEW): 1.3 CM
INTERVENTRICULAR SEPTUM: 1.3 CM (ref 0.6–1.1)
LAAS-AP2: 29 CM2
LAAS-AP4: 22.6 CM2
LEFT ATRIUM AREA SYSTOLE SINGLE PLANE A4C: 22.4 CM2
LEFT ATRIUM SIZE: 3.9 CM
LEFT INTERNAL DIMENSION IN SYSTOLE: 3.9 CM (ref 2.1–4)
LEFT VENTRICULAR INTERNAL DIMENSION IN DIASTOLE: 5.2 CM (ref 3.5–6)
LEFT VENTRICULAR POSTERIOR WALL IN END DIASTOLE: 1.1 CM
LEFT VENTRICULAR STROKE VOLUME: 66 ML
LVSV (TEICH): 66 ML
MITRAL REGURGITATION PEAK VELOCITY: 4.9 M/S
MITRAL VALVE MEAN INFLOW VELOCITY: 3.99 M/S
MITRAL VALVE REGURGITANT PEAK GRADIENT: 99 MMHG
RIGHT ATRIUM AREA SYSTOLE A4C: 21.1 CM2
RIGHT VENTRICLE ID DIMENSION: 4.1 CM
SL CV AV DECELERATION TIME RETROGRADE: 1991 MS
SL CV AV PEAK GRADIENT RETROGRADE: 79 MMHG
SL CV DOP CALC MV VTI RETROGRADE: 193.6 CM
SL CV LEFT ATRIUM LENGTH A2C: 6.1 CM
SL CV LV EF: 55
SL CV MV MEAN GRADIENT RETROGRADE: 71 MMHG
SL CV PED ECHO LEFT VENTRICLE DIASTOLIC VOLUME (MOD BIPLANE) 2D: 131 ML
SL CV PED ECHO LEFT VENTRICLE SYSTOLIC VOLUME (MOD BIPLANE) 2D: 65 ML
TR MAX PG: 32 MMHG
TR PEAK VELOCITY: 2.9 M/S
TRICUSPID ANNULAR PLANE SYSTOLIC EXCURSION: 2 CM
TRICUSPID VALVE PEAK REGURGITATION VELOCITY: 2.85 M/S

## 2023-03-14 ENCOUNTER — CONSULT (OUTPATIENT)
Dept: PLASTIC SURGERY | Facility: CLINIC | Age: 88
End: 2023-03-14

## 2023-03-14 VITALS
HEIGHT: 69 IN | DIASTOLIC BLOOD PRESSURE: 77 MMHG | HEART RATE: 57 BPM | TEMPERATURE: 96.6 F | BODY MASS INDEX: 32.58 KG/M2 | SYSTOLIC BLOOD PRESSURE: 173 MMHG | WEIGHT: 220 LBS

## 2023-03-14 DIAGNOSIS — L98.9 LESION OF DORSUM OF NOSE: Primary | ICD-10-CM

## 2023-03-14 DIAGNOSIS — L98.9 SKIN LESION: ICD-10-CM

## 2023-03-14 NOTE — PROGRESS NOTES
Subjective:    Patient ID: Evelyn Osorio is a 82 y o  male  HPI The patient presents today for evaluation of nasal lesion, patient is being seen at the request of his PCP, Dr Efrain Colin  Patient presents with his daughter today, who is his power of   They report the patient has had an ulcerated lesion, dorsum of his nose for at least 6 months  The patient denies any associated pain, drainage, bleeding, or itching  The patient does report moderate sun exposure throughout his life  The daughter states that she believes several years ago he had a precancerous lesion removed in the affected area  She denies any other family history of skin cancer  The patient is a non-smoker  Patient has medication allergies to Celecoxib, hydromorphone, and statins  The patient does take Eliquis and aspirin daily  The patient has an extensive past medical history significant for DVT, atrial fibrillation, CAD, chronic kidney disease, type 2 diabetes mellitus, history of CVA, hyperlipidemia, hypertension and vascular disease  The daughter states that the patient has been previously advised by his cardiologist to avoid going under anesthesia, as he is at a very high risk for cardiovascular event        Past Medical History:   Diagnosis Date   • Acute deep vein thrombosis (DVT) of brachial vein of left upper extremity ([address] ) 11/24/2017   • Acute deep vein thrombosis (DVT) of left peroneal vein (HCC)    • Acute ST elevation myocardial infarction [facility])    • Aortic valve stenosis    • Atrial fibrillation (HCC)    • Basilar artery stenosis    • Basilar artery stenosis    • Basilar artery stenosis 5/4/2020    MRA Head wo contrast (12/13/2019)  IMPRESSION:   Multifocal intracranial atherosclerotic disease with moderate to severe stenosis at the origin of the left M1 segment with additional atherosclerotic disease noted in the distal right M1 and proximal inferior left M2 branches    Moderate to severe stenosis in the proximal and mid basilar artery with nonvisualization of the right intradural vertebral   • Benign prostatic hyperplasia with lower urinary tract symptoms    • CAD (coronary artery disease)    • CAD in native artery 11/24/2017    Underwent cardiac catheterization on 1/30/2020 and had mid and distal LAD stent placed  Cardiac PCI (1/30/2020)  SUMMARY   CORONARY CIRCULATION: Mid LAD: There was a 90 % stenosis at the site of a prior stent  Distal LAD: There was a 90 % stenosis at the site of a prior stent  Left posterior descending artery: There was a diffuse 50 % stenosis    1ST LESION INTERVENTIONS: A balloon angioplasty wit   • Cerebrovascular small vessel disease 11/12/2020   • Chronic kidney disease    • Closed fracture of multiple ribs of left side with routine healing 9/3/2020   • DM2 (diabetes mellitus, type 2) ([facility] [address] )    • Elevated troponin 7/19/2021   • Herpes zoster without complication 5/16/4453   • History of CVA (cerebrovascular accident) 2/21/2019   • History of DVT of peroneal vein left lower extremity 12/16/2019   • History of transfusion    • HLD (hyperlipidemia)    • HTN (hypertension)    • Infected sebaceous cyst of skin 6/8/2021   • Lump in chest 6/1/2021   • Middle cerebral artery stenosis    • Mild to moderate aortic stenosis 10/9/2018    ECHO (7/2020)  AORTIC VALVE: Leaflets exhibited moderately increased thickness and moderate calcification  Transaortic velocity was increased due to valvular stenosis  There was mild to moderate stenosis  RICHY 1 4cm, mean pressure gradient 11mmHg, peak velocity 2 15m/s There was mild regurgitation     • Near syncope 7/19/2021   • Other proteinuria 10/8/2019   • Proteinuria    • Rib fractures (right) 7/31/2020   • Symptomatic bradycardia    • Transient cerebral ischemia    • Vitamin D deficiency        Patient Active Problem List   Diagnosis   • Hypertensive CKD (chronic kidney disease)   • Mixed hyperlipidemia due to type 2 diabetes mellitus ([facility] [address] )   • CAD (coronary artery disease)   • History of CVA (cerebrovascular accident)   • Paroxysmal atrial fibrillation (HCC)   • Type 2 diabetes mellitus with stage 3b chronic kidney disease, without long-term current use of insulin (HCC)   • Ambulatory dysfunction   • Other proteinuria   • Vitamin D deficiency   • Dementia (HCC)   • Gait instability   • Hearing loss   • Stage 3b chronic kidney disease (HCC)         Current Outpatient Medications:   •  acetaminophen (TYLENOL) 500 mg tablet, Take 500 mg by mouth every 6 (six) hours as needed for mild pain, Disp: , Rfl:   •  amLODIPine (NORVASC) 10 mg tablet, Take 1 tablet (10 mg total) by mouth daily, Disp: 90 tablet, Rfl: 3  •  apixaban (ELIQUIS) 2 5 mg, Take 1 tablet (2 5 mg total) by mouth 2 (two) times a day, Disp: 180 tablet, Rfl: 3  •  aspirin (ECOTRIN LOW STRENGTH) 81 mg EC tablet, Take 1 tablet (81 mg total) by mouth daily, Disp: , Rfl:   •  Blood Glucose Monitoring Suppl (ONE TOUCH ULTRA MINI) w/Device KIT, Use daily, Disp: 1 kit, Rfl: 0  •  cholecalciferol (VITAMIN D3) 1,000 units tablet, Take 1,000 Units by mouth daily, Disp: , Rfl:   •  finasteride (PROSCAR) 5 mg tablet, Take 1 tablet (5 mg total) by mouth daily, Disp: 90 tablet, Rfl: 3  •  gabapentin (NEURONTIN) 100 mg capsule, TAKE 1 CAPSULE BY MOUTH  TWICE DAILY, Disp: 180 capsule, Rfl: 3  •  glipiZIDE (GLUCOTROL XL) 2 5 mg 24 hr tablet, Take 1 tablet (2 5 mg total) by mouth daily, Disp: 90 tablet, Rfl: 1  •  glucose blood (OneTouch Ultra) test strip, Check blood sugars once daily, Disp: 100 each, Rfl: 3  •  isosorbide dinitrate (ISORDIL) 10 mg tablet, TAKE 1 TABLET BY MOUTH  TWICE DAILY, Disp: 180 tablet, Rfl: 3  •  lisinopril (ZESTRIL) 20 mg tablet, TAKE 1 TABLET BY MOUTH  DAILY, Disp: 90 tablet, Rfl: 3  •  metoprolol tartrate (LOPRESSOR) 25 mg tablet, TAKE ONE-HALF TABLET BY  MOUTH EVERY 12 HOURS, Disp: 90 tablet, Rfl: 3  •  Omega-3 Fatty Acids (FISH OIL CONCENTRATE PO), Take 1 tablet by mouth daily, Disp: , Rfl:   •  vitamin B-12 (CYANOCOBALAMIN) 100 MCG tablet, Take 1,000 mcg by mouth daily, Disp: , Rfl:     Past Surgical History:   Procedure Laterality Date   • CARDIAC CATHETERIZATION      Outcome: successful; last assessed: 02/03/2015   • CARDIAC CATHETERIZATION  11/26/2019   • CORONARY ANGIOPLASTY WITH STENT PLACEMENT  2008    stent to LAD    • HAND SURGERY      thumb   • HIP HARDWARE REMOVAL     • TOTAL HIP ARTHROPLASTY Right    • TOTAL HIP ARTHROPLASTY Bilateral        Social History     Tobacco Use   • Smoking status: Never   • Smokeless tobacco: Never   Substance Use Topics   • Alcohol use: Never       Review of Systems    Per HPI         Objective:  BP (!) 173/77   Pulse 57   Temp (!) 96 6 °F (35 9 °C)   Ht 5' 9" (1 753 m)   Wt 99 8 kg (220 lb)   BMI 32 49 kg/m²    Physical Exam  Vitals reviewed  Constitutional:       Appearance: He is normal weight  Cardiovascular:      Rate and Rhythm: Bradycardia present  Pulmonary:      Effort: Pulmonary effort is normal    Skin:     General: Skin is warm and dry  Comments: 5mm x 5mm ulceration with overlying scabbing on the right dorsum of the nose  There is no active bleeding or drainage  There is no associated pain  Lesion is highly indicative of malignancy  Neurological:      General: No focal deficit present  Mental Status: He is alert  Psychiatric:         Behavior: Behavior normal            Assessment/Plan: 46-year-old male with ulcerated lesion on the right dorsum of the nose      The patient's daughter was adamant today that cardiology strongly advised against the patient going under anesthesia due to comorbidities and high risk of cardiovascular event  Advised the patient and his daughter that there is a significant probability that this lesion is likely malignant, which will require Mohs    I advised him that from a reconstructive standpoint, this is typically something we do in the operating room, however, I will discuss this case with Dr Reinier Styles for further recommendatios on how we can proceed  I placed referrals for dermatology for biopsy and for Mohs as well  We will be in touch with the patient's daughter after discussion with Dr Henrique Shin PA-C  Plastic & Reconstructive Surgery

## 2023-03-21 ENCOUNTER — TELEPHONE (OUTPATIENT)
Dept: CARDIOLOGY CLINIC | Facility: CLINIC | Age: 88
End: 2023-03-21

## 2023-03-21 NOTE — TELEPHONE ENCOUNTER
----- Message from Hiren Banerjee MD sent at 3/20/2023  7:00 PM EDT -----  Please call the patient  Echocardiogram shows normal ejection fraction with moderate aortic stenosis  To continue present medications and report any symptoms out of the ordinary

## 2023-03-21 NOTE — TELEPHONE ENCOUNTER
Spoke with pt's daughter and she verbally understood the results of pt's Echo complete w/ contrast if indicated and for pt to continue present medications and report any symptoms out of the ordinary

## 2023-03-24 ENCOUNTER — TELEPHONE (OUTPATIENT)
Dept: NEPHROLOGY | Facility: CLINIC | Age: 88
End: 2023-03-24

## 2023-03-24 NOTE — TELEPHONE ENCOUNTER
I called and left message on patients answering machine regarding rescheduling the 08/23/2023 appointment  Patient was rescheduled for 09/27/2023 @ 11:00 am with Dr Robe Benson   Asked patient to call back the office to confirm appointment

## 2023-05-05 ENCOUNTER — TELEPHONE (OUTPATIENT)
Age: 88
End: 2023-05-05

## 2023-05-05 NOTE — TELEPHONE ENCOUNTER
Tiff Rai calling to report that Emeka Earing had a fall yesterday  He was not using his cane  Sidra Beards against the recliner  No open wounds, denies hitting his head  He is reporting intermittent dizziness  Told Susan if Emeka Earing feels he needs an appt to call us

## 2023-05-30 ENCOUNTER — OFFICE VISIT (OUTPATIENT)
Dept: CARDIOLOGY CLINIC | Facility: CLINIC | Age: 88
End: 2023-05-30

## 2023-05-30 VITALS
DIASTOLIC BLOOD PRESSURE: 80 MMHG | SYSTOLIC BLOOD PRESSURE: 130 MMHG | HEIGHT: 69 IN | RESPIRATION RATE: 16 BRPM | HEART RATE: 58 BPM | BODY MASS INDEX: 30.21 KG/M2 | OXYGEN SATURATION: 98 % | WEIGHT: 204 LBS

## 2023-05-30 DIAGNOSIS — I48.0 PAROXYSMAL ATRIAL FIBRILLATION (HCC): ICD-10-CM

## 2023-05-30 DIAGNOSIS — I10 ESSENTIAL HYPERTENSION: ICD-10-CM

## 2023-05-30 DIAGNOSIS — Z79.01 CHRONIC ANTICOAGULATION: ICD-10-CM

## 2023-05-30 DIAGNOSIS — I35.0 NONRHEUMATIC AORTIC VALVE STENOSIS: ICD-10-CM

## 2023-05-30 DIAGNOSIS — E78.2 MIXED HYPERLIPIDEMIA: ICD-10-CM

## 2023-05-30 DIAGNOSIS — I25.10 CORONARY ARTERY DISEASE INVOLVING NATIVE CORONARY ARTERY OF NATIVE HEART WITHOUT ANGINA PECTORIS: Primary | ICD-10-CM

## 2023-05-30 NOTE — PROGRESS NOTES
[facility] [place]  [address]  [address] [facility] [id number]  Cardiology Follow Up    David Ruiz  1935  [id number]      1  Coronary artery disease involving native coronary artery of native heart without angina pectoris        2  Paroxysmal atrial fibrillation (HCC)        3  Nonrheumatic aortic valve stenosis        4  Chronic anticoagulation        5  Essential hypertension        6  Mixed hyperlipidemia            Chief Complaint   Patient presents with   • Follow-up       Interval History: Patient presents for follow-up visit  Patient denies any history of chest pain shortness of breath  Patient denies any history of leg edema or orthopnea PND  No history of presyncope syncope  Patient states compliance with the present list of medications  Patient does have a history of CKD followed by nephrology  Daughter present during the office visit  Patient denies any bleeding issues  Patient does have chronic dizziness    He also has cognitive difficulty secondary to history of CVA in the past     Patient Active Problem List   Diagnosis   • Hypertensive CKD (chronic kidney disease)   • Mixed hyperlipidemia due to type 2 diabetes mellitus ([facility] [address] )   • CAD (coronary artery disease)   • History of CVA (cerebrovascular accident)   • Paroxysmal atrial fibrillation (HCC)   • Type 2 diabetes mellitus with stage 3b chronic kidney disease, without long-term current use of insulin ([facility] [address] )   • Ambulatory dysfunction   • Other proteinuria   • Vitamin D deficiency   • Dementia ([address] )   • Gait instability   • Hearing loss   • Stage 3b chronic kidney disease ([address] )     Past Medical History:   Diagnosis Date   • Acute deep vein thrombosis (DVT) of brachial vein of left upper extremity ([address] ) 11/24/2017   • Acute deep vein thrombosis (DVT) of left peroneal vein ([facility])    • Acute ST elevation myocardial infarction [facility])    • Aortic valve stenosis    • Atrial fibrillation ([address] )    • Basilar artery stenosis    • Basilar artery stenosis    • Basilar artery stenosis 5/4/2020    MRA Head wo contrast (12/13/2019)  IMPRESSION:   Multifocal intracranial atherosclerotic disease with moderate to severe stenosis at the origin of the left M1 segment with additional atherosclerotic disease noted in the distal right M1 and proximal inferior left M2 branches    Moderate to severe stenosis in the proximal and mid basilar artery with nonvisualization of the right intradural vertebral   • Benign prostatic hyperplasia with lower urinary tract symptoms    • CAD (coronary artery disease)    • CAD in native artery 11/24/2017    Underwent cardiac catheterization on 1/30/2020 and had mid and distal LAD stent placed  Cardiac PCI (1/30/2020)  SUMMARY   CORONARY CIRCULATION: Mid LAD: There was a 90 % stenosis at the site of a prior stent  Distal LAD: There was a 90 % stenosis at the site of a prior stent  Left posterior descending artery: There was a diffuse 50 % stenosis    1ST LESION INTERVENTIONS: A balloon angioplasty wit   • Cerebrovascular small vessel disease 11/12/2020   • Chronic kidney disease    • Closed fracture of multiple ribs of left side with routine healing 9/3/2020   • DM[facility] mellitus, type 2) [facility] )    • Elevated troponin 7/19/2021   • Herpes zoster without complication 8/05/7670   • History of CVA (cerebrovascular accident) 2/21/2019   • History of DVT of peroneal vein left lower extremity 12/16/2019   • History of transfusion    • HLD (hyperlipidemia)    • HTN (hypertension)    • Infected sebaceous cyst of skin 6/8/2021   • Lump in chest 6/1/2021   • Middle cerebral artery stenosis    • Mild to moderate aortic stenosis 10/9/2018    ECHO (7/2020)  AORTIC VALVE: Leaflets exhibited moderately increased thickness and moderate calcification  Transaortic velocity was increased due to valvular stenosis  There was mild to moderate stenosis   RICHY 1 4cm, mean pressure gradient 11mmHg, peak velocity 2 15m/s There was mild regurgitation  • Near syncope 7/19/2021   • Other proteinuria 10/8/2019   • Proteinuria    • Rib fractures (right) 7/31/2020   • Symptomatic bradycardia    • Transient cerebral ischemia    • Vitamin D deficiency      Social History     Socioeconomic History   • Marital status:      Spouse name: Not on file   • Number of children: Not on file   • Years of education: Not on file   • Highest education level: Not on file   Occupational History   • Not on file   Tobacco Use   • Smoking status: Never   • Smokeless tobacco: Never   Vaping Use   • Vaping Use: Never used   Substance and Sexual Activity   • Alcohol use: Never   • Drug use: No   • Sexual activity: Not Currently     Partners: Female     Birth control/protection: None   Other Topics Concern   • Not on file   Social History Narrative    Active advance directive (as per [employer])     No advance directive on file (as per [employer])      Social Determinants of Health     Financial Resource Strain: Low Risk  (1/19/2023)    Overall Financial Resource Strain (CARDIA)    • Difficulty of Paying Living Expenses: Not hard at all   Food Insecurity: Not on file   Transportation Needs: No Transportation Needs (1/19/2023)    PRAPARE - Transportation    • Lack of Transportation (Medical): No    • Lack of Transportation (Non-Medical): No   Physical Activity: Inactive (5/11/2021)    Exercise Vital Sign    • Days of Exercise per Week: 0 days    • Minutes of Exercise per Session: 0 min   Stress: No Stress Concern Present (5/11/2021)    [address]    • Feeling of Stress :  Only a little   Social Connections: Not on file   Intimate Partner Violence: Not on file   Housing Stability: Not on file      Family History   Problem Relation Age of Onset   • Emphysema Father    • Emphysema Sister      Past Surgical History:   Procedure Laterality Date   • CARDIAC CATHETERIZATION      Outcome: successful; last assessed: 02/03/2015   • CARDIAC CATHETERIZATION  11/26/2019   • CORONARY ANGIOPLASTY WITH STENT PLACEMENT  2008    stent to LAD    • HAND SURGERY      thumb   • HIP HARDWARE REMOVAL     • TOTAL HIP ARTHROPLASTY Right    • TOTAL HIP ARTHROPLASTY Bilateral        Current Outpatient Medications:   •  acetaminophen (TYLENOL) 500 mg tablet, Take 500 mg by mouth every 6 (six) hours as needed for mild pain, Disp: , Rfl:   •  amLODIPine (NORVASC) 10 mg tablet, Take 1 tablet (10 mg total) by mouth daily, Disp: 90 tablet, Rfl: 3  •  apixaban (ELIQUIS) 2 5 mg, Take 1 tablet (2 5 mg total) by mouth 2 (two) times a day, Disp: 180 tablet, Rfl: 3  •  aspirin (ECOTRIN LOW STRENGTH) 81 mg EC tablet, Take 1 tablet (81 mg total) by mouth daily, Disp: , Rfl:   •  Blood Glucose Monitoring Suppl (ONE TOUCH ULTRA MINI) w/Device KIT, Use daily, Disp: 1 kit, Rfl: 0  •  cholecalciferol (VITAMIN D3) 1,000 units tablet, Take 1,000 Units by mouth daily, Disp: , Rfl:   •  finasteride (PROSCAR) 5 mg tablet, Take 1 tablet (5 mg total) by mouth daily, Disp: 90 tablet, Rfl: 3  •  gabapentin (NEURONTIN) 100 mg capsule, TAKE 1 CAPSULE BY MOUTH  TWICE DAILY, Disp: 180 capsule, Rfl: 3  •  glipiZIDE (GLUCOTROL XL) 2 5 mg 24 hr tablet, Take 1 tablet (2 5 mg total) by mouth daily, Disp: 90 tablet, Rfl: 1  •  glucose blood (OneTouch Ultra) test strip, Check blood sugars once daily, Disp: 100 each, Rfl: 3  •  isosorbide dinitrate (ISORDIL) 10 mg tablet, TAKE 1 TABLET BY MOUTH  TWICE DAILY, Disp: 180 tablet, Rfl: 3  •  lisinopril (ZESTRIL) 20 mg tablet, TAKE 1 TABLET BY MOUTH  DAILY, Disp: 90 tablet, Rfl: 3  •  metoprolol tartrate (LOPRESSOR) 25 mg tablet, TAKE ONE-HALF TABLET BY  MOUTH EVERY 12 HOURS, Disp: 90 tablet, Rfl: 3  •  Omega-3 Fatty Acids (FISH OIL CONCENTRATE PO), Take 1 tablet by mouth daily, Disp: , Rfl:   •  vitamin B-12 (CYANOCOBALAMIN) 100 MCG tablet, Take 1,000 mcg by mouth daily, Disp: , Rfl:   Allergies   Allergen Reactions   • Celecoxib Other (See Comments)     Per pt does NOT remember type of reaction or severity level   • Hydromorphone Other (See Comments)     Per pt does NOT remember type of reaction or severity level   • Pravastatin Hives   • Statins Other (See Comments)     Per pt does NOT remember type of reaction or severity level       Labs:  No visits with results within 2 Month(s) from this visit     Latest known visit with results is:   Hospital Outpatient Visit on 03/13/2023   Component Date Value   • AV area peak booker 03/13/2023 1 3    • MV mean gradient retrogr* 03/13/2023 71    • AV peak gradient 03/13/2023 79    • LA size 03/13/2023 3 9    • Aortic valve mean veloci* 03/13/2023 19 90    • Mitral regurgitation pea* 03/13/2023 4 90    • Mitral valve mean inflow* 03/13/2023 3 99    • Triscuspid Valve Regurgi* 03/13/2023 32 0    • Tricuspid valve peak reg* 03/13/2023 2 85    • LVPWd 03/13/2023 1 10    • Left Atrium Area-systoli* 03/13/2023 29    • Left Atrium Area-systoli* 03/13/2023 22 6    • Tricuspid annular plane * 03/13/2023 2 00    • TR Peak Booker 03/13/2023 2 9    • IVSd 03/13/2023 5 70    • LV DIASTOLIC VOLUME (MOD* 77/17/5484 131    • LEFT VENTRICLE SYSTOLIC * 96/49/9295 65    • Left ventricular stroke * 03/13/2023 66 00    • AV Deceleration Time 03/13/2023 1,991    • MV VTI RETROGRADE 03/13/2023 193 6    • A4C EF 03/13/2023 66    • LA length (A2C) 03/13/2023 6 10    • LVIDd 03/13/2023 5 20    • IVS 03/13/2023 1 3    • LVIDS 03/13/2023 3 90    • FS 03/13/2023 25    • Ao root 03/13/2023 3 90    • RVID d 03/13/2023 4 1    • LVOT mn grad 03/13/2023 1 0    • AV area by cont VTI 03/13/2023 1 4    • AV regurgitation pressur* 03/13/2023 577    • AV mean gradient 03/13/2023 17    • AV LVOT peak gradient 03/13/2023 2    • LVOT diameter 03/13/2023 2 4    • LVOT peak booker 03/13/2023 0 76    • LVOT peak VTI 03/13/2023 18 92    • Aortic valve peak veloci* 03/13/2023 2 66    • Ao VTI 03/13/2023 61 69    • LVOT stroke volume 03/13/2023 85 55    • "AV peak gradient 03/13/2023 29    • ANDRADE A4C 03/13/2023 22 4    • MR PG 03/13/2023 99    • RAA A4C 03/13/2023 21 1    • LVOT stroke volume index 03/13/2023 38 60    • LVSV, 2D 03/13/2023 66    • LVOT area 03/13/2023 4 52    • DVI 03/13/2023 0 29    • AV valve area 03/13/2023 1 39    • LV EF 03/13/2023 55      Imaging: No results found  Review of Systems:  Review of Systems   REVIEW OF SYSTEMS:  Constitutional:  Denies fever or chills   Eyes:  Denies change in visual acuity   HENT: Hard of hearing  Respiratory:  Denies cough or shortness of breath   Cardiovascular:  Denies chest pain or edema   GI:  Denies abdominal pain, nausea, vomiting, bloody stools or diarrhea   :  Denies dysuria, frequency, difficulty in micturition and nocturia  Musculoskeletal:  Denies back pain or joint pain   Neurologic: History of stroke  Endocrine:  Denies polyuria or polydipsia   Lymphatic:  Denies swollen glands   Psychiatric:  Denies depression or anxiety    Physical Exam:    /80 (BP Location: Left arm, Patient Position: Sitting, Cuff Size: Standard)   Pulse 58   Resp 16   Ht 5' 9\" (1 753 m)   Wt 92 5 kg (204 lb)   SpO2 98%   BMI 30 13 kg/m²     Physical Exam   PHYSICAL EXAM:  General:  Patient is not in acute distress   Head: Normocephalic, Atraumatic  HEENT:  Both pupils normal-size atraumatic, normocephalic, nonicteric  Neck:  JVP not raised  Trachea central  No carotid bruit  Respiratory:  normal breath sounds no crackles  no rhonchi  Cardiovascular:  Regular rate and rhythm no S3 3/6 systolic ejection murmur in the right sternal border  GI:  Abdomen soft nontender  No organomegaly  Lymphatic:  No cervical or inguinal lymphadenopathy  Neurologic:  Patient is awake alert, oriented   Extremities trace edema    Echocardiogram showed normal ejection fraction with moderate aortic stenosis  This was reviewed with patient and family      Discussion/Summary:  Patient with multiple medical problems who seems to be doing " reasonably well from cardiac standpoint  Previous studies reviewed with patient  Medications reviewed and possible side effects discussed  concepts of cardiovascular disease , signs and symptoms of heart disease  Dietary and risk factor modification reinforced  All questions answered  Safety measures reviewed  Patient advised to report any problems prompting medical attention  Risks and benefits  and alternatives of anticoagulation to prevent thromboembolic risk from atrial fibrillation discussed at length  Patient to report any bleeding issues  Patient does have a history of gait instability secondary to history of CVA in the past   Follow-up with neurology for the same  Follow-up with nephrology for CKD  Prescriptions reviewed  Results of echocardiogram reviewed with patient and family  Follow-up in 6 months or earlier as needed  Patient is agreeable with the plan of care  Overall conservative management based on advanced age and multiple comorbidities

## 2023-06-22 DIAGNOSIS — N18.32 TYPE 2 DIABETES MELLITUS WITH STAGE 3B CHRONIC KIDNEY DISEASE, WITHOUT LONG-TERM CURRENT USE OF INSULIN (HCC): ICD-10-CM

## 2023-06-22 DIAGNOSIS — E11.22 TYPE 2 DIABETES MELLITUS WITH STAGE 3B CHRONIC KIDNEY DISEASE, WITHOUT LONG-TERM CURRENT USE OF INSULIN (HCC): ICD-10-CM

## 2023-06-22 RX ORDER — GLIPIZIDE 2.5 MG/1
TABLET, EXTENDED RELEASE ORAL
Qty: 90 TABLET | Refills: 3 | Status: SHIPPED | OUTPATIENT
Start: 2023-06-22

## 2023-07-19 ENCOUNTER — APPOINTMENT (OUTPATIENT)
Dept: LAB | Facility: HOSPITAL | Age: 88
End: 2023-07-19
Payer: MEDICARE

## 2023-07-19 DIAGNOSIS — N18.32 TYPE 2 DIABETES MELLITUS WITH STAGE 3B CHRONIC KIDNEY DISEASE, WITHOUT LONG-TERM CURRENT USE OF INSULIN (HCC): Chronic | ICD-10-CM

## 2023-07-19 DIAGNOSIS — E11.22 TYPE 2 DIABETES MELLITUS WITH STAGE 3B CHRONIC KIDNEY DISEASE, WITHOUT LONG-TERM CURRENT USE OF INSULIN (HCC): Chronic | ICD-10-CM

## 2023-07-19 LAB
EST. AVERAGE GLUCOSE BLD GHB EST-MCNC: 143 MG/DL
HBA1C MFR BLD: 6.6 %

## 2023-07-19 PROCEDURE — 36415 COLL VENOUS BLD VENIPUNCTURE: CPT

## 2023-07-19 PROCEDURE — 83036 HEMOGLOBIN GLYCOSYLATED A1C: CPT

## 2023-08-14 ENCOUNTER — APPOINTMENT (EMERGENCY)
Dept: RADIOLOGY | Facility: HOSPITAL | Age: 88
End: 2023-08-14
Payer: MEDICARE

## 2023-08-14 ENCOUNTER — APPOINTMENT (EMERGENCY)
Dept: CT IMAGING | Facility: HOSPITAL | Age: 88
End: 2023-08-14
Payer: MEDICARE

## 2023-08-14 ENCOUNTER — HOSPITAL ENCOUNTER (OUTPATIENT)
Facility: HOSPITAL | Age: 88
Setting detail: OBSERVATION
Discharge: HOME | End: 2023-08-15
Attending: EMERGENCY MEDICINE | Admitting: STUDENT IN AN ORGANIZED HEALTH CARE EDUCATION/TRAINING PROGRAM
Payer: MEDICARE
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
## 2023-08-14 DIAGNOSIS — R77.8 ELEVATED TROPONIN: ICD-10-CM

## 2023-08-14 DIAGNOSIS — I50.23 ACUTE ON CHRONIC SYSTOLIC CONGESTIVE HEART FAILURE (HCC): ICD-10-CM

## 2023-08-14 DIAGNOSIS — M54.50 LOW BACK PAIN: ICD-10-CM

## 2023-08-14 DIAGNOSIS — R10.30 LOWER ABDOMINAL PAIN: Primary | ICD-10-CM

## 2023-08-14 DIAGNOSIS — R06.09 EXERTIONAL DYSPNEA: ICD-10-CM

## 2023-08-14 PROBLEM — E11.9 DM (DIABETES MELLITUS) (HCC): Status: ACTIVE | Noted: 2023-08-14

## 2023-08-14 PROBLEM — I10 HTN (HYPERTENSION): Status: ACTIVE | Noted: 2023-08-14

## 2023-08-14 PROBLEM — I50.9 CHF (CONGESTIVE HEART FAILURE) (HCC): Status: ACTIVE | Noted: 2023-08-14

## 2023-08-14 LAB
2HR DELTA HS TROPONIN: 0 NG/L
4HR DELTA HS TROPONIN: 5 NG/L
ALBUMIN SERPL BCP-MCNC: 3.9 G/DL (ref 3.5–5)
ALP SERPL-CCNC: 46 U/L (ref 34–104)
ALT SERPL W P-5'-P-CCNC: 13 U/L (ref 7–52)
ANION GAP SERPL CALCULATED.3IONS-SCNC: 5 MMOL/L
AST SERPL W P-5'-P-CCNC: 15 U/L (ref 13–39)
BACTERIA UR QL AUTO: ABNORMAL /HPF
BASOPHILS # BLD AUTO: 0.05 THOUSANDS/ÂΜL (ref 0–0.1)
BASOPHILS NFR BLD AUTO: 1 % (ref 0–1)
BILIRUB DIRECT SERPL-MCNC: 0.11 MG/DL (ref 0–0.2)
BILIRUB SERPL-MCNC: 0.59 MG/DL (ref 0.2–1)
BILIRUB UR QL STRIP: NEGATIVE
BNP SERPL-MCNC: 1599 PG/ML (ref 0–100)
BUN SERPL-MCNC: 30 MG/DL (ref 5–25)
CALCIUM SERPL-MCNC: 8.7 MG/DL (ref 8.4–10.2)
CARDIAC TROPONIN I PNL SERPL HS: 56 NG/L
CARDIAC TROPONIN I PNL SERPL HS: 56 NG/L
CARDIAC TROPONIN I PNL SERPL HS: 61 NG/L
CHLORIDE SERPL-SCNC: 104 MMOL/L (ref 96–108)
CLARITY UR: CLEAR
CO2 SERPL-SCNC: 27 MMOL/L (ref 21–32)
COLOR UR: ABNORMAL
CREAT SERPL-MCNC: 1.63 MG/DL (ref 0.6–1.3)
EOSINOPHIL # BLD AUTO: 0.25 THOUSAND/ÂΜL (ref 0–0.61)
EOSINOPHIL NFR BLD AUTO: 3 % (ref 0–6)
ERYTHROCYTE [DISTWIDTH] IN BLOOD BY AUTOMATED COUNT: 13.8 % (ref 11.6–15.1)
GFR SERPL CREATININE-BSD FRML MDRD: 37 ML/MIN/1.73SQ M
GLUCOSE SERPL-MCNC: 182 MG/DL (ref 65–140)
GLUCOSE SERPL-MCNC: 312 MG/DL (ref 65–140)
GLUCOSE SERPL-MCNC: 92 MG/DL (ref 65–140)
GLUCOSE UR STRIP-MCNC: ABNORMAL MG/DL
HCT VFR BLD AUTO: 36.9 % (ref 36.5–49.3)
HGB BLD-MCNC: 12 G/DL (ref 12–17)
HGB UR QL STRIP.AUTO: ABNORMAL
HYALINE CASTS #/AREA URNS LPF: ABNORMAL /LPF
IMM GRANULOCYTES # BLD AUTO: 0.02 THOUSAND/UL (ref 0–0.2)
IMM GRANULOCYTES NFR BLD AUTO: 0 % (ref 0–2)
KETONES UR STRIP-MCNC: NEGATIVE MG/DL
LACTATE SERPL-SCNC: 1.3 MMOL/L (ref 0.5–2)
LEUKOCYTE ESTERASE UR QL STRIP: NEGATIVE
LIPASE SERPL-CCNC: 20 U/L (ref 11–82)
LYMPHOCYTES # BLD AUTO: 1.5 THOUSANDS/ÂΜL (ref 0.6–4.47)
LYMPHOCYTES NFR BLD AUTO: 20 % (ref 14–44)
MCH RBC QN AUTO: 31.3 PG (ref 26.8–34.3)
MCHC RBC AUTO-ENTMCNC: 32.5 G/DL (ref 31.4–37.4)
MCV RBC AUTO: 96 FL (ref 82–98)
MONOCYTES # BLD AUTO: 0.57 THOUSAND/ÂΜL (ref 0.17–1.22)
MONOCYTES NFR BLD AUTO: 7 % (ref 4–12)
MUCOUS THREADS UR QL AUTO: ABNORMAL
NEUTROPHILS # BLD AUTO: 5.28 THOUSANDS/ÂΜL (ref 1.85–7.62)
NEUTS SEG NFR BLD AUTO: 69 % (ref 43–75)
NITRITE UR QL STRIP: NEGATIVE
NON-SQ EPI CELLS URNS QL MICRO: ABNORMAL /HPF
NRBC BLD AUTO-RTO: 0 /100 WBCS
PH UR STRIP.AUTO: 6 [PH]
PLATELET # BLD AUTO: 167 THOUSANDS/UL (ref 149–390)
PMV BLD AUTO: 10.2 FL (ref 8.9–12.7)
POTASSIUM SERPL-SCNC: 3.8 MMOL/L (ref 3.5–5.3)
PROT SERPL-MCNC: 6.9 G/DL (ref 6.4–8.4)
PROT UR STRIP-MCNC: ABNORMAL MG/DL
RBC # BLD AUTO: 3.84 MILLION/UL (ref 3.88–5.62)
RBC #/AREA URNS AUTO: ABNORMAL /HPF
SODIUM SERPL-SCNC: 136 MMOL/L (ref 135–147)
SP GR UR STRIP.AUTO: 1.03 (ref 1–1.03)
UROBILINOGEN UR STRIP-ACNC: <2 MG/DL
WBC # BLD AUTO: 7.67 THOUSAND/UL (ref 4.31–10.16)
WBC #/AREA URNS AUTO: ABNORMAL /HPF

## 2023-08-14 PROCEDURE — 99285 EMERGENCY DEPT VISIT HI MDM: CPT | Performed by: PHYSICIAN ASSISTANT

## 2023-08-14 PROCEDURE — 84484 ASSAY OF TROPONIN QUANT: CPT | Performed by: PHYSICIAN ASSISTANT

## 2023-08-14 PROCEDURE — 73090 X-RAY EXAM OF FOREARM: CPT

## 2023-08-14 PROCEDURE — 74177 CT ABD & PELVIS W/CONTRAST: CPT

## 2023-08-14 PROCEDURE — G1004 CDSM NDSC: HCPCS

## 2023-08-14 PROCEDURE — 83605 ASSAY OF LACTIC ACID: CPT | Performed by: PHYSICIAN ASSISTANT

## 2023-08-14 PROCEDURE — 93005 ELECTROCARDIOGRAM TRACING: CPT

## 2023-08-14 PROCEDURE — 83690 ASSAY OF LIPASE: CPT | Performed by: PHYSICIAN ASSISTANT

## 2023-08-14 PROCEDURE — 82948 REAGENT STRIP/BLOOD GLUCOSE: CPT

## 2023-08-14 PROCEDURE — 81001 URINALYSIS AUTO W/SCOPE: CPT | Performed by: PHYSICIAN ASSISTANT

## 2023-08-14 PROCEDURE — 36415 COLL VENOUS BLD VENIPUNCTURE: CPT | Performed by: PHYSICIAN ASSISTANT

## 2023-08-14 PROCEDURE — 71046 X-RAY EXAM CHEST 2 VIEWS: CPT

## 2023-08-14 PROCEDURE — 83880 ASSAY OF NATRIURETIC PEPTIDE: CPT | Performed by: STUDENT IN AN ORGANIZED HEALTH CARE EDUCATION/TRAINING PROGRAM

## 2023-08-14 PROCEDURE — 85025 COMPLETE CBC W/AUTO DIFF WBC: CPT | Performed by: PHYSICIAN ASSISTANT

## 2023-08-14 PROCEDURE — 80076 HEPATIC FUNCTION PANEL: CPT | Performed by: PHYSICIAN ASSISTANT

## 2023-08-14 PROCEDURE — 99285 EMERGENCY DEPT VISIT HI MDM: CPT

## 2023-08-14 PROCEDURE — 80048 BASIC METABOLIC PNL TOTAL CA: CPT | Performed by: PHYSICIAN ASSISTANT

## 2023-08-14 PROCEDURE — 96360 HYDRATION IV INFUSION INIT: CPT

## 2023-08-14 PROCEDURE — 99223 1ST HOSP IP/OBS HIGH 75: CPT | Performed by: STUDENT IN AN ORGANIZED HEALTH CARE EDUCATION/TRAINING PROGRAM

## 2023-08-14 RX ORDER — MAGNESIUM SULFATE HEPTAHYDRATE 40 MG/ML
2 INJECTION, SOLUTION INTRAVENOUS ONCE
Status: COMPLETED | OUTPATIENT
Start: 2023-08-14 | End: 2023-08-15

## 2023-08-14 RX ORDER — FUROSEMIDE 10 MG/ML
40 INJECTION INTRAMUSCULAR; INTRAVENOUS DAILY
Status: DISCONTINUED | OUTPATIENT
Start: 2023-08-14 | End: 2023-08-15 | Stop reason: HOSPADM

## 2023-08-14 RX ORDER — ISOSORBIDE DINITRATE 10 MG/1
10 TABLET ORAL 2 TIMES DAILY
Status: DISCONTINUED | OUTPATIENT
Start: 2023-08-14 | End: 2023-08-15 | Stop reason: HOSPADM

## 2023-08-14 RX ORDER — GABAPENTIN 100 MG/1
100 CAPSULE ORAL 2 TIMES DAILY
Status: DISCONTINUED | OUTPATIENT
Start: 2023-08-14 | End: 2023-08-15 | Stop reason: HOSPADM

## 2023-08-14 RX ORDER — FINASTERIDE 5 MG/1
5 TABLET, FILM COATED ORAL DAILY
Status: DISCONTINUED | OUTPATIENT
Start: 2023-08-15 | End: 2023-08-15 | Stop reason: HOSPADM

## 2023-08-14 RX ORDER — INSULIN LISPRO 100 [IU]/ML
1-5 INJECTION, SOLUTION INTRAVENOUS; SUBCUTANEOUS
Status: DISCONTINUED | OUTPATIENT
Start: 2023-08-14 | End: 2023-08-15 | Stop reason: HOSPADM

## 2023-08-14 RX ORDER — LISINOPRIL 20 MG/1
20 TABLET ORAL DAILY
Status: DISCONTINUED | OUTPATIENT
Start: 2023-08-15 | End: 2023-08-15 | Stop reason: HOSPADM

## 2023-08-14 RX ORDER — AMLODIPINE BESYLATE 10 MG/1
10 TABLET ORAL DAILY
Status: DISCONTINUED | OUTPATIENT
Start: 2023-08-15 | End: 2023-08-15 | Stop reason: HOSPADM

## 2023-08-14 RX ORDER — POTASSIUM CHLORIDE 20 MEQ/1
40 TABLET, EXTENDED RELEASE ORAL ONCE
Status: COMPLETED | OUTPATIENT
Start: 2023-08-14 | End: 2023-08-14

## 2023-08-14 RX ADMIN — FUROSEMIDE 40 MG: 10 INJECTION, SOLUTION INTRAMUSCULAR; INTRAVENOUS at 18:10

## 2023-08-14 RX ADMIN — APIXABAN 2.5 MG: 2.5 TABLET, FILM COATED ORAL at 18:02

## 2023-08-14 RX ADMIN — IOHEXOL 100 ML: 350 INJECTION, SOLUTION INTRAVENOUS at 11:58

## 2023-08-14 RX ADMIN — MAGNESIUM SULFATE HEPTAHYDRATE 2 G: 40 INJECTION, SOLUTION INTRAVENOUS at 22:24

## 2023-08-14 RX ADMIN — SODIUM CHLORIDE 750 ML: 0.9 INJECTION, SOLUTION INTRAVENOUS at 11:30

## 2023-08-14 RX ADMIN — POTASSIUM CHLORIDE 40 MEQ: 1500 TABLET, EXTENDED RELEASE ORAL at 22:24

## 2023-08-14 RX ADMIN — ISOSORBIDE DINITRATE 10 MG: 10 TABLET ORAL at 18:02

## 2023-08-14 RX ADMIN — GABAPENTIN 100 MG: 100 CAPSULE ORAL at 18:02

## 2023-08-14 NOTE — ASSESSMENT & PLAN NOTE
Present on admission history of paroxysmal A-fib. EKG noted with A-fib and slow ventricular rate. Continue home dose of Norvasc, Isordil,. Hold Lopressor for now due to HR in 50's. Continue home dose of Eliquis.
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
## 2023-08-14 NOTE — ASSESSMENT & PLAN NOTE
Wt Readings from Last 3 Encounters:   08/14/23 94.4 kg (208 lb 1.8 oz)   05/30/23 92.5 kg (204 lb)   03/14/23 99.8 kg (220 lb)     Patient is poor historian. Wife reports patient has been having episodes of dyspnea and orthopnea. Noted bilateral rales on exam.  Chest x-ray noted with pulmonary edema. start on IV Lasix 40 mg daily. Follow-up with 2D echo.

## 2023-08-14 NOTE — ASSESSMENT & PLAN NOTE
Present on admission history of hypertension. Pressure was uncontrolled on presentation however currently better. Resume home dose of Norvasc, lisinopril, Lopressor.

## 2023-08-14 NOTE — INCIDENTAL FINDINGS
The following findings require follow up:  Radiographic finding     Finding:     KIDNEYS/URETERS: Bilateral renal cysts. No urinary tract calculus or hydronephrosis. Symmetric nephrograms. There are a few circumscribed renal lesions which do not measure simple fluid density, most notably a 14 mm lesion exophytic in the interpolar posterior right kidney. This lesion measured simple fluid and 11 mm in size in October 2021; therefore the finding is most likely a complex. However, in the unlikely event that this represents a weakly enhancing very slowly growing solid lesion, it remains unlikely to be a clinically significant lesion in an 82year-old patient. Trabeculation of urinary bladder wall with bladder diverticulosis presumably representing the sequela of chronic bladder outlet obstruction due to prostate disease.         Follow up required: YES      Please notify the following clinician to assist with the follow up with your primary care provider and Urology for above.

## 2023-08-14 NOTE — PLAN OF CARE
Problem: PAIN - ADULT  Goal: Verbalizes/displays adequate comfort level or baseline comfort level  Description: Interventions:  - Encourage patient to monitor pain and request assistance  - Assess pain using appropriate pain scale  - Administer analgesics based on type and severity of pain and evaluate response  - Implement non-pharmacological measures as appropriate and evaluate response  - Consider cultural and social influences on pain and pain management  - Notify physician/advanced practitioner if interventions unsuccessful or patient reports new pain  Outcome: Progressing     Problem: METABOLIC, FLUID AND ELECTROLYTES - ADULT  Goal: Fluid balance maintained  Description: INTERVENTIONS:  - Monitor labs   - Monitor I/O and WT  - Instruct patient on fluid and nutrition as appropriate  - Assess for signs & symptoms of volume excess or deficit  Outcome: Progressing

## 2023-08-14 NOTE — ASSESSMENT & PLAN NOTE
Present on admission with history of CAD. Most recent cardiac cath in 01/2020   Noted with mildly elevated flat troponin.   Continue aspirin, Eliquis

## 2023-08-14 NOTE — ASSESSMENT & PLAN NOTE
Lab Results   Component Value Date    HGBA1C 6.6 (H) 07/19/2023       No results for input(s): "POCGLU" in the last 72 hours. Blood Sugar Average: Last 72 hrs:     Present on admission history of diabetes. Most recent A1c 6.6. Hold p.o. agents while inpatient. Continue with sliding-scale coverage.
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
## 2023-08-14 NOTE — ED PROVIDER NOTES
History  Chief Complaint   Patient presents with   • Abdominal Pain     Pt has B/L abd pain that radiates to his back x 2 days. Sid Calles is an 44-year-old male with prior history of CVA and dementia, atrial fibrillation, CAD, anticoagulated on Eliquis, arriving by EMS for evaluation with complaint of lower abdominal pain and back pain. Patient is unable to recall when exactly his symptoms started but states that it developed a couple of days ago with no inciting event to his knowledge. He does however report that he had a mechanical fall 2 days ago at which time he injured his right forearm, denying any abdominal or back trauma that he is aware of. He denies any head strike or loss of consciousness. He denies any chest pain, palpitations, diaphoresis, shortness of breath, headache or neck pain, extremity paresthesias or weakness. He denies any difficulty urinating, dysuria, urinary urgency or frequency, malodorous or cloudy urine, hematuria, testicular pain or scrotal edema. Patient's daughter notes that the patient also has not been eating well or taking care of himself recently. Patient and daughter offer no other complaints or concerns at this time. Prior to Admission Medications   Prescriptions Last Dose Informant Patient Reported? Taking?    Blood Glucose Monitoring Suppl (ONE TOUCH ULTRA MINI) w/Device KIT  Self, Family Member No No   Sig: Use daily   Omega-3 Fatty Acids (FISH OIL CONCENTRATE PO)  Self, Family Member Yes No   Sig: Take 1 tablet by mouth daily   acetaminophen (TYLENOL) 500 mg tablet  Self, Family Member Yes No   Sig: Take 500 mg by mouth every 6 (six) hours as needed for mild pain   amLODIPine (NORVASC) 10 mg tablet  Self, Family Member No No   Sig: Take 1 tablet (10 mg total) by mouth daily   apixaban (ELIQUIS) 2.5 mg  Self, Family Member No No   Sig: Take 1 tablet (2.5 mg total) by mouth 2 (two) times a day   aspirin (ECOTRIN LOW STRENGTH) 81 mg EC tablet  Self, Family Member No No   Sig: Take 1 tablet (81 mg total) by mouth daily   cholecalciferol (VITAMIN D3) 1,000 units tablet  Self, Family Member Yes No   Sig: Take 1,000 Units by mouth daily   finasteride (PROSCAR) 5 mg tablet  Self, Family Member No No   Sig: Take 1 tablet (5 mg total) by mouth daily   gabapentin (NEURONTIN) 100 mg capsule  Self, Family Member No No   Sig: TAKE 1 CAPSULE BY MOUTH  TWICE DAILY   glipiZIDE (GLUCOTROL XL) 2.5 mg 24 hr tablet   No No   Sig: TAKE 1 TABLET BY MOUTH DAILY   glucose blood (OneTouch Ultra) test strip  Self, Family Member No No   Sig: Check blood sugars once daily   isosorbide dinitrate (ISORDIL) 10 mg tablet  Self, Family Member No No   Sig: TAKE 1 TABLET BY MOUTH  TWICE DAILY   lisinopril (ZESTRIL) 20 mg tablet  Self, Family Member No No   Sig: TAKE 1 TABLET BY MOUTH  DAILY   metoprolol tartrate (LOPRESSOR) 25 mg tablet  Self, Family Member No No   Sig: TAKE ONE-HALF TABLET BY  MOUTH EVERY 12 HOURS   vitamin B-12 (CYANOCOBALAMIN) 100 MCG tablet  Self, Family Member Yes No   Sig: Take 1,000 mcg by mouth daily      Facility-Administered Medications: None       Past Medical History:   Diagnosis Date   • Acute deep vein thrombosis (DVT) of brachial vein of left upper extremity (HCC) 11/24/2017   • Acute deep vein thrombosis (DVT) of left peroneal vein ([facility])    • Acute ST elevation myocardial infarction [facility])    • Aortic valve stenosis    • Atrial fibrillation ([facility])    • Basilar artery stenosis    • Basilar artery stenosis    • Basilar artery stenosis 5/4/2020    MRA Head wo contrast (12/13/2019)  IMPRESSION:   Multifocal intracranial atherosclerotic disease with moderate to severe stenosis at the origin of the left M1 segment with additional atherosclerotic disease noted in the distal right M1 and proximal inferior left M2 branches.   Moderate to severe stenosis in the proximal and mid basilar artery with nonvisualization of the right intradural vertebral   • Benign prostatic hyperplasia with lower urinary tract symptoms    • CAD (coronary artery disease)    • CAD in native artery 11/24/2017    Underwent cardiac catheterization on 1/30/2020 and had mid and distal LAD stent placed  Cardiac PCI (1/30/2020)  SUMMARY   CORONARY CIRCULATION: Mid LAD: There was a 90 % stenosis at the site of a prior stent. Distal LAD: There was a 90 % stenosis at the site of a prior stent. Left posterior descending artery: There was a diffuse 50 % stenosis.   1ST LESION INTERVENTIONS: A balloon angioplasty wit   • Cerebrovascular small vessel disease 11/12/2020   • Chronic kidney disease    • Closed fracture of multiple ribs of left side with routine healing 9/3/2020   • DM2 (diabetes mellitus, type 2) ([address])    • Elevated troponin 7/19/2021   • Herpes zoster without complication 6/64/4142   • History of CVA (cerebrovascular accident) 2/21/2019   • History of DVT of peroneal vein left lower extremity 12/16/2019   • History of transfusion    • HLD (hyperlipidemia)    • HTN (hypertension)    • Infected sebaceous cyst of skin 6/8/2021   • Lump in chest 6/1/2021   • Middle cerebral artery stenosis    • Mild to moderate aortic stenosis 10/9/2018    ECHO (7/2020)  AORTIC VALVE: Leaflets exhibited moderately increased thickness and moderate calcification. Transaortic velocity was increased due to valvular stenosis. There was mild to moderate stenosis. RICHY 1.4cm, mean pressure gradient 11mmHg, peak velocity 2.15m/s There was mild regurgitation.    • Near syncope 7/19/2021   • Other proteinuria 10/8/2019   • Proteinuria    • Rib fractures (right) 7/31/2020   • Symptomatic bradycardia    • Transient cerebral ischemia    • Vitamin D deficiency        Past Surgical History:   Procedure Laterality Date   • CARDIAC CATHETERIZATION      Outcome: successful; last assessed: 02/03/2015   • CARDIAC CATHETERIZATION  11/26/2019   • CORONARY ANGIOPLASTY WITH STENT PLACEMENT  2008    stent to LAD    • HAND SURGERY      thumb   • HIP HARDWARE REMOVAL     • TOTAL HIP ARTHROPLASTY Right    • TOTAL HIP ARTHROPLASTY Bilateral        Family History   Problem Relation Age of Onset   • Emphysema Father    • Emphysema Sister      I have reviewed and agree with the history as documented. E-Cigarette/Vaping   • E-Cigarette Use Never User      E-Cigarette/Vaping Substances   • Nicotine No    • THC No    • CBD No    • Flavoring No    • Other No    • Unknown No      Social History     Tobacco Use   • Smoking status: Never   • Smokeless tobacco: Never   Vaping Use   • Vaping Use: Never used   Substance Use Topics   • Alcohol use: Never   • Drug use: No       Review of Systems   Unable to perform ROS: Dementia   Gastrointestinal: Positive for abdominal pain. Musculoskeletal: Positive for back pain. Physical Exam  Physical Exam  Vitals and nursing note reviewed. Constitutional:       General: He is not in acute distress. Appearance: Normal appearance. He is well-developed. He is not ill-appearing, toxic-appearing or diaphoretic. HENT:      Head: Normocephalic and atraumatic. Right Ear: External ear normal.      Left Ear: External ear normal.   Eyes:      Conjunctiva/sclera: Conjunctivae normal.   Cardiovascular:      Rate and Rhythm: Normal rate and regular rhythm. Pulses: Normal pulses. Pulmonary:      Effort: Pulmonary effort is normal. No respiratory distress. Breath sounds: Normal breath sounds. No wheezing, rhonchi or rales. Chest:      Chest wall: No tenderness. Abdominal:      General: There is no distension. Palpations: Abdomen is soft. Tenderness: There is abdominal tenderness. Comments: Generalized lower abdominal tenderness upon palpation. No peritoneal signs. Musculoskeletal:         General: Normal range of motion. Cervical back: Normal range of motion and neck supple. Comments: Moderate edema and ecchymosis to the right forearm with no bleeding deformity or step-off on palpation. Distal sensation and pulses intact. Patient able to actively range the right shoulder, elbow, hand and wrist without difficulty. No snuffbox tenderness to palpation or pain with palpation along the remainder of the right hand and wrist.  Compartments soft. Distal sensation and pulses intact. Skin:     General: Skin is warm and dry. Capillary Refill: Capillary refill takes less than 2 seconds. Neurological:      Mental Status: He is alert. Mental status is at baseline. Motor: Motor function is intact. No weakness.    Psychiatric:         Mood and Affect: Mood normal.         Vital Signs  ED Triage Vitals [08/14/23 1055]   Temperature Pulse Respirations Blood Pressure SpO2   98.5 °F (36.9 °C) (!) 54 18 (!) 182/84 96 %      Temp Source Heart Rate Source Patient Position - Orthostatic VS BP Location FiO2 (%)   Oral Monitor Lying Right arm --      Pain Score       --           Vitals:    08/14/23 1055   BP: (!) 182/84   Pulse: (!) 54   Patient Position - Orthostatic VS: Lying         Visual Acuity      ED Medications  Medications   sodium chloride 0.9 % bolus 750 mL (has no administration in time range)       Diagnostic Studies  Results Reviewed     Procedure Component Value Units Date/Time    HS Troponin I 4hr [[id number]]  (Abnormal) Collected: 08/14/23 1753    Lab Status: Final result Specimen: Blood from Arm, Right Updated: 08/14/23 1822     hs TnI 4hr 61 ng/L      Delta 4hr hsTnI 5 ng/L     HS Troponin I 2hr [[id number]]  (Abnormal) Collected: 08/14/23 1413    Lab Status: Final result Specimen: Blood from Arm, Left Updated: 08/14/23 1448     hs TnI 2hr 56 ng/L      Delta 2hr hsTnI 0 ng/L     HS Troponin 0hr (reflex protocol) [[id number]]  (Abnormal) Collected: 08/14/23 1213    Lab Status: Final result Specimen: Blood from Arm, Left Updated: 08/14/23 1249     hs TnI 0hr 56 ng/L     Urine Microscopic [[id number]]  (Abnormal) Collected: 08/14/23 1217    Lab Status: Final result Specimen: Urine, Clean Catch Updated: 08/14/23 1228     RBC, UA 1-2 /hpf      WBC, UA 1-2 /hpf      Epithelial Cells None Seen /hpf      Bacteria, UA Occasional /hpf      MUCUS THREADS Occasional     Hyaline Casts, UA 3-5 /lpf     UA w Reflex to Microscopic w Reflex to Culture [[id number]]  (Abnormal) Collected: 08/14/23 1217    Lab Status: Final result Specimen: Urine, Clean Catch Updated: 08/14/23 1225     Color, UA Light Yellow     Clarity, UA Clear     Specific Gravity, UA 1.034     pH, UA 6.0     Leukocytes, UA Negative     Nitrite, UA Negative     Protein,  (3+) mg/dl      Glucose,  (1/2%) mg/dl      Ketones, UA Negative mg/dl      Urobilinogen, UA <2.0 mg/dl      Bilirubin, UA Negative     Occult Blood, UA Small    Basic metabolic panel [[id number]]  (Abnormal) Collected: 08/14/23 1107    Lab Status: Final result Specimen: Blood from Arm, Left Updated: 08/14/23 1150     Sodium 136 mmol/L      Potassium 3.8 mmol/L      Chloride 104 mmol/L      CO2 27 mmol/L      ANION GAP 5 mmol/L      BUN 30 mg/dL      Creatinine 1.63 mg/dL      Glucose 312 mg/dL      Calcium 8.7 mg/dL      eGFR 37 ml/min/1.73sq m     Narrative:      [place] guidelines for Chronic Kidney Disease (CKD):   •  Stage 1 with normal or high GFR (GFR > 90 mL/min/1.73 square meters)  •  Stage 2 Mild CKD (GFR = 60-89 mL/min/1.73 square meters)  •  Stage 3A Moderate CKD (GFR = 45-59 mL/min/1.73 square meters)  •  Stage 3B Moderate CKD (GFR = 30-44 mL/min/1.73 square meters)  •  Stage 4 Severe CKD (GFR = 15-29 mL/min/1.73 square meters)  •  Stage 5 End Stage CKD (GFR <15 mL/min/1.73 square meters)  Note: GFR calculation is accurate only with a steady state creatinine    Hepatic function panel [[id number]]  (Normal) Collected: 08/14/23 1107    Lab Status: Final result Specimen: Blood from Arm, Left Updated: 08/14/23 1150     Total Bilirubin 0.59 mg/dL      Bilirubin, Direct 0.11 mg/dL      Alkaline Phosphatase 46 U/L      AST 15 U/L      ALT 13 U/L      Total Protein 6.9 g/dL      Albumin 3.9 g/dL     Lipase [[id number]]  (Normal) Collected: 08/14/23 1107    Lab Status: Final result Specimen: Blood from Arm, Left Updated: 08/14/23 1150     Lipase 20 u/L     Lactic acid, plasma (w/reflex if result > 2.0) [[id number]]  (Normal) Collected: 08/14/23 1107    Lab Status: Final result Specimen: Blood from Arm, Left Updated: 08/14/23 1148     LACTIC ACID 1.3 mmol/L     Narrative:      Result may be elevated if tourniquet was used during collection. CBC and differential [[id number]]  (Abnormal) Collected: 08/14/23 1107    Lab Status: Final result Specimen: Blood from Arm, Left Updated: 08/14/23 1126     WBC 7.67 Thousand/uL      RBC 3.84 Million/uL      Hemoglobin 12.0 g/dL      Hematocrit 36.9 %      MCV 96 fL      MCH 31.3 pg      MCHC 32.5 g/dL      RDW 13.8 %      MPV 10.2 fL      Platelets 519 Thousands/uL      nRBC 0 /100 WBCs      Neutrophils Relative 69 %      Immat GRANS % 0 %      Lymphocytes Relative 20 %      Monocytes Relative 7 %      Eosinophils Relative 3 %      Basophils Relative 1 %      Neutrophils Absolute 5.28 Thousands/µL      Immature Grans Absolute 0.02 Thousand/uL      Lymphocytes Absolute 1.50 Thousands/µL      Monocytes Absolute 0.57 Thousand/µL      Eosinophils Absolute 0.25 Thousand/µL      Basophils Absolute 0.05 Thousands/µL                  XR forearm 2 views RIGHT   Final Result by Viridiana Perdomo MD (08/14 3886)   No acute displaced fractures   No fracture seen within the shaft of the radius and ulna      Workstation performed: [id number]         XR chest pa & lateral   Final Result by Cosmo Colbert MD (08/14 4728)   Pulmonary edema. No pleural effusion. Cardiomegaly. Resident: Julissa Tavarez, the attending radiologist, have reviewed the images and agree with the final report above.       Workstation performed: [id number]         CT abdomen pelvis with contrast   Final Result by Tyrone Zapata Antonio Rocha MD (08/14 0432)      No acute CT findings to account for lower abdominal pain. Trabeculation of urinary bladder wall with bladder diverticulosis presumably representing the sequela of chronic bladder outlet obstruction due to prostate disease. Advanced lumbar degenerative changes. Cardiomegaly and extensive abdominal arterial atherosclerotic disease. Workstation performed: [id number]         CT recon only lumbar spine   Final Result by Kellei Serrano MD (08/14 1256)      No acute fracture. No traumatic malalignment. Advanced lumbar degenerative changes most notably at L3-L4 where there is severe central canal stenosis. Workstation performed: [id number]                    Procedures  ECG 12 Lead Documentation Only    Date/Time: 8/14/2023 11:01 AM    Performed by: Cayla Maxwell PA-C  Authorized by: Cayla Maxwell PA-C    Indications / Diagnosis:  Weakness  ECG reviewed by me, the ED Provider: yes    Patient location:  ED  Rate:     ECG rate:  58    ECG rate assessment: bradycardic    Rhythm:     Rhythm: atrial fibrillation    Ectopy:     Ectopy: none    QRS:     QRS axis:  Normal  Conduction:     Conduction: abnormal      Abnormal conduction: non-specific intraventricular conduction delay    ST segments:     ST segments:  Non-specific  T waves:     T waves: non-specific               ED Course                               SBIRT 20yo+    Flowsheet Row Most Recent Value   Initial Alcohol Screen: US AUDIT-C     1. How often do you have a drink containing alcohol? 0 Filed at: 08/14/2023 1059   2. How many drinks containing alcohol do you have on a typical day you are drinking? 0 Filed at: 08/14/2023 1059   3b. FEMALE Any Age, or MALE 65+: How often do you have 4 or more drinks on one occassion? 0 Filed at: 08/14/2023 1059   Audit-C Score 0 Filed at: 08/14/2023 1059   MAVERICK: How many times in the past year have you. ..     Used an illegal drug or used a prescription medication for non-medical reasons? Never Filed at: 08/14/2023 1059                    Medical Decision Making  This is an 80-year-old male presenting for evaluation with complaint of low back and abdominal pain. Patient states that symptoms have been present for couple of days, with no inciting event to his knowledge. No UTI symptoms, fevers or chills or other systemic complaints. Differential diagnosis includes but is not limited to: Will obtain CT abdomen/pelvis with recon of the lumbar spine to evaluate for any acute intra-abdominal pelvic process or bony pathology; urinary tract infection, cystitis, colitis, diverticulitis, constipation, bowel obstruction, tumor, muscle spasm, electrolyte derangements, acute kidney injury, anginal equivalent; will also x-ray the right forearm to evaluate for acute fractures/dislocation, contusion or hematoma    Initial ED plan: ECG, labs, imaging, dispo pending    Final ED Assessment: Vital signs reviewed on ED presentation, examination as above. Patient is bradycardic on presentation with ECG showing atrial fibrillation with slow ventricular response. All labs and imaging independently reviewed with imaging interpreted by the Radiologist.  Initial troponin value elevated at 56, with repeat at 61 and delta of 5.  CT abdomen/pelvis reports no acute CT findings to account for lower abdominal pain with trabeculation of the urinary bladder wall with bladder diverticulosis presumably representing sequela of chronic bladder outlet obstruction due to prostate disease, and advanced lumbar degenerative changes. Test results reviewed with the patient and daughter at bedside. Patient does report that he has been experiencing episodes of exertional dyspnea with no active chest pain or shortness of breath at this time.   We discussed plan for hospital admission to the internal medicine service for telemetry monitoring, serial ECG/troponins, and continued care and further management per the internal medicine service which they are understanding of and agreeable with. Case discussed with Dr. Pham Burroughs [facility], who accepts patient for admission. The patient has remained hemodynamically stable without new or worsening symptoms during ED course and is stable for admission, bridging orders placed. Elevated troponin: acute illness or injury  Low back pain: acute illness or injury  Lower abdominal pain: acute illness or injury  Amount and/or Complexity of Data Reviewed  Labs: ordered. Radiology: ordered. ECG/medicine tests: ordered. Risk  Prescription drug management. Decision regarding hospitalization. Disposition  Final diagnoses:   Lower abdominal pain   Low back pain   Exertional dyspnea   Elevated troponin     Time reflects when diagnosis was documented in both MDM as applicable and the Disposition within this note     Time User Action Codes Description Comment    8/14/2023  1:15 PM Nestora Vivek Add [R10.30] Lower abdominal pain     8/14/2023  1:15 PM Nestora Vivek Add [M54.50] Low back pain     8/14/2023  1:16 PM Nestora Vivek Add [R06.09] Exertional dyspnea     8/14/2023  1:16 PM Ana Gardanette [R77.8] Elevated troponin     8/15/2023 10:52 AM Jake Bell Add [I50.23] Acute on chronic systolic congestive heart failure ([address])     8/15/2023 12:39 PM Wilma Bell Add [Z17. Deloise Croft, initial encounter       ED Disposition     ED Disposition   Admit    Condition   Stable    Date/Time   Mon Aug 14, 2023  1:02 PM    Comment   Case was discussed with nnamdi and the patient's admission status was agreed to be Admission Status: observation status to the service of Dr. Pham Burroughs . Follow-up Information    None         Patient's Medications   Discharge Prescriptions    No medications on file       No discharge procedures on file.     PDMP Review       Value Time User    PDMP Reviewed  Yes 5/25/2022  1:30 PM Jason Rasheed, [address] [place] [address]          ED Provider  Electronically Signed by           Imtiaz Garza PA-C  08/16/23 8390

## 2023-08-15 ENCOUNTER — TELEPHONE (OUTPATIENT)
Dept: CARDIOLOGY CLINIC | Facility: CLINIC | Age: 88
End: 2023-08-15

## 2023-08-15 ENCOUNTER — APPOINTMENT (OUTPATIENT)
Dept: NON INVASIVE DIAGNOSTICS | Facility: HOSPITAL | Age: 88
End: 2023-08-15
Payer: MEDICARE

## 2023-08-15 VITALS
OXYGEN SATURATION: 97 % | TEMPERATURE: 97.7 F | BODY MASS INDEX: 29.78 KG/M2 | SYSTOLIC BLOOD PRESSURE: 143 MMHG | HEART RATE: 62 BPM | WEIGHT: 208 LBS | HEIGHT: 70 IN | DIASTOLIC BLOOD PRESSURE: 77 MMHG | RESPIRATION RATE: 20 BRPM

## 2023-08-15 LAB
ANION GAP SERPL CALCULATED.3IONS-SCNC: 7 MMOL/L
AORTIC ROOT: 3.7 CM
AORTIC VALVE MEAN VELOCITY: 19.8 M/S
APICAL FOUR CHAMBER EJECTION FRACTION: 32 %
ASCENDING AORTA: 3.4 CM
AV AREA BY CONTINUOUS VTI: 1.5 CM2
AV AREA PEAK VELOCITY: 1.4 CM2
AV LVOT MEAN GRADIENT: 1 MMHG
AV LVOT PEAK GRADIENT: 2 MMHG
AV MEAN GRADIENT: 18 MMHG
AV PEAK GRADIENT: 30 MMHG
AV REGURGITATION PRESSURE HALF TIME: 394 MS
AV VALVE AREA: 1.14 CM2
AV VELOCITY RATIO: 0.29
BUN SERPL-MCNC: 26 MG/DL (ref 5–25)
CALCIUM SERPL-MCNC: 9.7 MG/DL (ref 8.4–10.2)
CHLORIDE SERPL-SCNC: 104 MMOL/L (ref 96–108)
CO2 SERPL-SCNC: 25 MMOL/L (ref 21–32)
CREAT SERPL-MCNC: 1.6 MG/DL (ref 0.6–1.3)
DOP CALC AO PEAK VEL: 2.68 M/S
DOP CALC AO VTI: 57.21 CM
DOP CALC LVOT AREA: 3.8 CM2
DOP CALC LVOT CARDIAC INDEX: 2.8 L/MIN/M2
DOP CALC LVOT CARDIAC OUTPUT: 5.93 L/MIN
DOP CALC LVOT DIAMETER: 2.2 CM
DOP CALC LVOT PEAK VEL VTI: 17.14 CM
DOP CALC LVOT PEAK VEL: 0.78 M/S
DOP CALC LVOT STROKE INDEX: 40.1 ML/M2
DOP CALC LVOT STROKE VOLUME: 65.12 CM3
E WAVE DECELERATION TIME: 167 MS
ERYTHROCYTE [DISTWIDTH] IN BLOOD BY AUTOMATED COUNT: 13.7 % (ref 11.6–15.1)
FRACTIONAL SHORTENING: 16 % (ref 28–44)
GFR SERPL CREATININE-BSD FRML MDRD: 38 ML/MIN/1.73SQ M
GLUCOSE P FAST SERPL-MCNC: 146 MG/DL (ref 65–99)
GLUCOSE SERPL-MCNC: 146 MG/DL (ref 65–140)
GLUCOSE SERPL-MCNC: 162 MG/DL (ref 65–140)
GLUCOSE SERPL-MCNC: 213 MG/DL (ref 65–140)
HCT VFR BLD AUTO: 39.2 % (ref 36.5–49.3)
HGB BLD-MCNC: 13.1 G/DL (ref 12–17)
INTERVENTRICULAR SEPTUM IN DIASTOLE (PARASTERNAL SHORT AXIS VIEW): 1.3 CM
INTERVENTRICULAR SEPTUM: 1.3 CM (ref 0.6–1.1)
LAAS-AP2: 26.3 CM2
LAAS-AP4: 26.9 CM2
LEFT ATRIUM AREA SYSTOLE SINGLE PLANE A4C: 28.1 CM2
LEFT ATRIUM SIZE: 4.5 CM
LEFT ATRIUM VOLUME (MOD BIPLANE): 87 ML
LEFT INTERNAL DIMENSION IN SYSTOLE: 4.3 CM (ref 2.1–4)
LEFT VENTRICULAR INTERNAL DIMENSION IN DIASTOLE: 5.1 CM (ref 3.5–6)
LEFT VENTRICULAR POSTERIOR WALL IN END DIASTOLE: 0.9 CM
LEFT VENTRICULAR STROKE VOLUME: 43 ML
LVSV (TEICH): 43 ML
MCH RBC QN AUTO: 31.5 PG (ref 26.8–34.3)
MCHC RBC AUTO-ENTMCNC: 33.4 G/DL (ref 31.4–37.4)
MCV RBC AUTO: 94 FL (ref 82–98)
MV E'TISSUE VEL-SEP: 6 CM/S
MV PEAK A VEL: 0.44 M/S
MV PEAK E VEL: 132 CM/S
MV STENOSIS PRESSURE HALF TIME: 48 MS
MV VALVE AREA P 1/2 METHOD: 4.58 CM2
PA SYSTOLIC PRESSURE: 40 MMHG
PLATELET # BLD AUTO: 169 THOUSANDS/UL (ref 149–390)
PMV BLD AUTO: 9.4 FL (ref 8.9–12.7)
POTASSIUM SERPL-SCNC: 4.2 MMOL/L (ref 3.5–5.3)
RBC # BLD AUTO: 4.16 MILLION/UL (ref 3.88–5.62)
RIGHT ATRIUM AREA SYSTOLE A4C: 24.5 CM2
RIGHT VENTRICLE ID DIMENSION: 4.2 CM
SL CV AV DECELERATION TIME RETROGRADE: 1359 MS
SL CV AV PEAK GRADIENT RETROGRADE: 73 MMHG
SL CV LEFT ATRIUM LENGTH A2C: 6.2 CM
SL CV LV EF: 50
SL CV PED ECHO LEFT VENTRICLE DIASTOLIC VOLUME (MOD BIPLANE) 2D: 125 ML
SL CV PED ECHO LEFT VENTRICLE SYSTOLIC VOLUME (MOD BIPLANE) 2D: 82 ML
SODIUM SERPL-SCNC: 136 MMOL/L (ref 135–147)
TR MAX PG: 42 MMHG
TR PEAK VELOCITY: 3.2 M/S
TRICUSPID ANNULAR PLANE SYSTOLIC EXCURSION: 1.8 CM
TRICUSPID VALVE PEAK REGURGITATION VELOCITY: 3.23 M/S
WBC # BLD AUTO: 8.12 THOUSAND/UL (ref 4.31–10.16)

## 2023-08-15 PROCEDURE — 97166 OT EVAL MOD COMPLEX 45 MIN: CPT

## 2023-08-15 PROCEDURE — 93306 TTE W/DOPPLER COMPLETE: CPT

## 2023-08-15 PROCEDURE — 99239 HOSP IP/OBS DSCHRG MGMT >30: CPT | Performed by: INTERNAL MEDICINE

## 2023-08-15 PROCEDURE — 80048 BASIC METABOLIC PNL TOTAL CA: CPT | Performed by: STUDENT IN AN ORGANIZED HEALTH CARE EDUCATION/TRAINING PROGRAM

## 2023-08-15 PROCEDURE — 85027 COMPLETE CBC AUTOMATED: CPT | Performed by: STUDENT IN AN ORGANIZED HEALTH CARE EDUCATION/TRAINING PROGRAM

## 2023-08-15 PROCEDURE — 97162 PT EVAL MOD COMPLEX 30 MIN: CPT

## 2023-08-15 PROCEDURE — 82948 REAGENT STRIP/BLOOD GLUCOSE: CPT

## 2023-08-15 PROCEDURE — 93306 TTE W/DOPPLER COMPLETE: CPT | Performed by: INTERNAL MEDICINE

## 2023-08-15 PROCEDURE — 99215 OFFICE O/P EST HI 40 MIN: CPT | Performed by: INTERNAL MEDICINE

## 2023-08-15 RX ORDER — LIDOCAINE 50 MG/G
1 PATCH TOPICAL DAILY
Qty: 15 PATCH | Refills: 0 | Status: SHIPPED | OUTPATIENT
Start: 2023-08-16

## 2023-08-15 RX ORDER — LIDOCAINE 50 MG/G
1 PATCH TOPICAL DAILY
Status: DISCONTINUED | OUTPATIENT
Start: 2023-08-15 | End: 2023-08-15 | Stop reason: HOSPADM

## 2023-08-15 RX ORDER — ACETAMINOPHEN 325 MG/1
650 TABLET ORAL EVERY 6 HOURS PRN
Status: DISCONTINUED | OUTPATIENT
Start: 2023-08-15 | End: 2023-08-15 | Stop reason: HOSPADM

## 2023-08-15 RX ORDER — FUROSEMIDE 40 MG/1
40 TABLET ORAL DAILY
Qty: 30 TABLET | Refills: 0 | Status: SHIPPED | OUTPATIENT
Start: 2023-08-15 | End: 2023-08-15 | Stop reason: SDUPTHER

## 2023-08-15 RX ORDER — LIDOCAINE 50 MG/G
1 PATCH TOPICAL DAILY
Qty: 15 PATCH | Refills: 0 | Status: SHIPPED | OUTPATIENT
Start: 2023-08-16 | End: 2023-08-15 | Stop reason: SDUPTHER

## 2023-08-15 RX ORDER — FUROSEMIDE 40 MG/1
40 TABLET ORAL DAILY
Qty: 30 TABLET | Refills: 0 | Status: SHIPPED | OUTPATIENT
Start: 2023-08-15 | End: 2023-09-14

## 2023-08-15 RX ADMIN — FUROSEMIDE 40 MG: 10 INJECTION, SOLUTION INTRAMUSCULAR; INTRAVENOUS at 09:48

## 2023-08-15 RX ADMIN — ISOSORBIDE DINITRATE 10 MG: 10 TABLET ORAL at 09:48

## 2023-08-15 RX ADMIN — APIXABAN 2.5 MG: 2.5 TABLET, FILM COATED ORAL at 09:47

## 2023-08-15 RX ADMIN — AMLODIPINE BESYLATE 10 MG: 10 TABLET ORAL at 09:47

## 2023-08-15 RX ADMIN — INSULIN LISPRO 1 UNITS: 100 INJECTION, SOLUTION INTRAVENOUS; SUBCUTANEOUS at 12:18

## 2023-08-15 RX ADMIN — FINASTERIDE 5 MG: 5 TABLET, FILM COATED ORAL at 09:48

## 2023-08-15 RX ADMIN — ASPIRIN 81 MG: 81 TABLET, COATED ORAL at 09:48

## 2023-08-15 RX ADMIN — GABAPENTIN 100 MG: 100 CAPSULE ORAL at 09:47

## 2023-08-15 RX ADMIN — LISINOPRIL 20 MG: 20 TABLET ORAL at 09:47

## 2023-08-15 RX ADMIN — ACETAMINOPHEN 650 MG: 325 TABLET, FILM COATED ORAL at 05:57

## 2023-08-15 RX ADMIN — LIDOCAINE 5% 1 PATCH: 700 PATCH TOPICAL at 05:57

## 2023-08-15 NOTE — ASSESSMENT & PLAN NOTE
Present on admission history of hypertension. Pressure was uncontrolled on presentation however currently better.   Resume home med Norvasc, lisinopril, Lopressor

## 2023-08-15 NOTE — ASSESSMENT & PLAN NOTE
Present on admission history of paroxysmal A-fib. EKG noted with A-fib and slow ventricular rate. Resume home meds  Continue home dose of Eliquis.

## 2023-08-15 NOTE — PHYSICAL THERAPY NOTE
Physical Therapy Evaluation     Patient's Name: Barry Abreu    Admitting Diagnosis  Low back pain [M54.50]  Abdominal pain [R10.9]  Exertional dyspnea [R06.09]  Elevated troponin [R77.8]  Lower abdominal pain [R10.30]    Problem List  Patient Active Problem List   Diagnosis    Hypertensive CKD (chronic kidney disease)    Mixed hyperlipidemia due to type 2 diabetes mellitus ([address])    Coronary artery disease involving native coronary artery of native heart without angina pectoris    History of CVA (cerebrovascular accident)    Paroxysmal atrial fibrillation ([address])    Type 2 diabetes mellitus with stage 3b chronic kidney disease, without long-term current use of insulin ([address])    Fall    Ambulatory dysfunction    Other proteinuria    Vitamin D deficiency    Dementia ([address])    Gait instability    Hearing loss    Stage 3b chronic kidney disease (HCC)    HTN (hypertension)    DM (diabetes mellitus) ([address])    CHF (congestive heart failure) ([address])       Past Medical History  Past Medical History:   Diagnosis Date    Acute deep vein thrombosis (DVT) of brachial vein of left upper extremity ([address]) 11/24/2017    Acute deep vein thrombosis (DVT) of left peroneal vein (HCC)     Acute ST elevation myocardial infarction [facility])     Aortic valve stenosis     Atrial fibrillation (HCC)     Basilar artery stenosis     Basilar artery stenosis     Basilar artery stenosis 5/4/2020    MRA Head wo contrast (12/13/2019)  IMPRESSION:   Multifocal intracranial atherosclerotic disease with moderate to severe stenosis at the origin of the left M1 segment with additional atherosclerotic disease noted in the distal right M1 and proximal inferior left M2 branches.    Moderate to severe stenosis in the proximal and mid basilar artery with nonvisualization of the right intradural vertebral    Benign prostatic hyperplasia with lower urinary tract symptoms     CAD (coronary artery disease)     CAD in native artery 11/24/2017    Underwent cardiac catheterization on 1/30/2020 and had mid and distal LAD stent placed  Cardiac PCI (1/30/2020)  SUMMARY   CORONARY CIRCULATION: Mid LAD: There was a 90 % stenosis at the site of a prior stent. Distal LAD: There was a 90 % stenosis at the site of a prior stent. Left posterior descending artery: There was a diffuse 50 % stenosis. 1ST LESION INTERVENTIONS: A balloon angioplasty wit    Cerebrovascular small vessel disease 11/12/2020    Chronic kidney disease     Closed fracture of multiple ribs of left side with routine healing 9/3/2020    DM2 (diabetes mellitus, type 2) (HCC)     Elevated troponin 7/19/2021    Herpes zoster without complication 8/09/4010    History of CVA (cerebrovascular accident) 2/21/2019    History of DVT of peroneal vein left lower extremity 12/16/2019    History of transfusion     HLD (hyperlipidemia)     HTN (hypertension)     Infected sebaceous cyst of skin 6/8/2021    Lump in chest 6/1/2021    Middle cerebral artery stenosis     Mild to moderate aortic stenosis 10/9/2018    ECHO (7/2020)  AORTIC VALVE: Leaflets exhibited moderately increased thickness and moderate calcification. Transaortic velocity was increased due to valvular stenosis. There was mild to moderate stenosis. RICHY 1.4cm, mean pressure gradient 11mmHg, peak velocity 2.15m/s There was mild regurgitation.     Near syncope 7/19/2021    Other proteinuria 10/8/2019    Proteinuria     Rib fractures (right) 7/31/2020    Symptomatic bradycardia     Transient cerebral ischemia     Vitamin D deficiency        Past Surgical History  Past Surgical History:   Procedure Laterality Date    CARDIAC CATHETERIZATION      Outcome: successful; last assessed: 02/03/2015    CARDIAC CATHETERIZATION  11/26/2019    CORONARY ANGIOPLASTY WITH STENT PLACEMENT  2008    stent to LAD     HAND SURGERY      thumb    HIP HARDWARE REMOVAL      TOTAL HIP ARTHROPLASTY Right     TOTAL HIP ARTHROPLASTY Bilateral         08/15/23 1033   PT Last Visit   PT Visit Date 08/15/23   Note Type   Note type Evaluation   Pain Assessment   Pain Assessment Tool 0-10   Pain Score No Pain   Restrictions/Precautions   Weight Bearing Precautions Per Order No   Other Precautions Cognitive; Chair Alarm; Bed Alarm; Fall Risk   Home Living   Type of [facility] One level;Performs ADLs on one level; Able to live on main level with bedroom/bathroom;Stairs to enter with rails  (4 DILMA)   Bathroom Shower/Tub Walk-in shower   Bathroom Toilet Standard   Bathroom Equipment Grab bars in [address] cane  ("they want me to always use the cane but I can't. It gets in the way")   Prior Function   Level of [place] Independent with ADLs; Independent with functional mobility; Needs assistance with IADLS   Lives With Significant other  (Girlfriend of 15 years)   Receives Help From Family   IADLs Independent with driving;Family/Friend/Other provides meals; Family/Friend/Other provides medication management   Falls in the last 6 months 1 to 4  ("I have had a few others")   Vocational Retired   General   Family/Caregiver Present No   Cognition   Overall Cognitive Status Impaired   Arousal/Participation Alert   Attention Attends with cues to redirect   Orientation Level Oriented to person;Oriented to place; Disoriented to time;Disoriented to situation  (oriented to month but disoriented to year)   Memory Decreased short term memory;Decreased recall of recent events;Decreased recall of precautions   Following Commands Follows one step commands without difficulty   Comments pt agreeable to PT evaluation   RLE Assessment   RLE Assessment   (grossly 4/5)   LLE Assessment   LLE Assessment   (grossly 4/5)   Vision-Basic Assessment   Current Vision Wears glasses only for reading   Coordination   Movements are Fluid and Coordinated 1   Sensation [facility] Mobility   Additional Comments pt received OOB to recliner upon arrival   Transfers   Sit to Stand 4 Minimal assistance  (CGA)   Additional items Assist x 1; Armrests; Impulsive;Verbal cues   Stand to Sit 4  Minimal assistance  (CGA)   Additional items Assist x 1; Increased time required;Verbal cues;Armrests   Additional Comments pt denying any lightheadedness/dizziness   Ambulation/Elevation   Gait pattern Short stride; Wide ARDHA  (unsteady, lateral sway)   Gait Assistance 4  Minimal assist  (CGA)   Additional items Assist x 1;Verbal cues   Assistive Device None  (pt declining QC at this time)   Distance 160'   Stair Management Assistance 4  Minimal assist  (CGA)   Additional items Assist x 1;Verbal cues   Stair Management Technique One rail R;Alternating pattern; Foreward   Number of Stairs 4   Balance   Static Sitting Good   Dynamic Sitting Fair +   Static Standing Fair   Dynamic Standing Fair -   Ambulatory Fair -   Endurance Deficit   Endurance Deficit No   Activity Tolerance   Activity Tolerance Patient tolerated treatment well   Assessment   Prognosis Good   Problem List Decreased strength;Decreased mobility; Impaired balance;Decreased endurance   Assessment Pt is 82 y.o. male seen for PT evaluation on 8/15/2023 s/p admit to [id number] [facility] on 8/14/2023 w/ Fall. PT was consulted to assess pt's functional mobility and d/c needs. Order placed for PT eval and tx. PTA, pt resides with S.O. in mobile home with 4 DILMA, ambulates with QC prn, +fall history. At time of eval, pt requiring CGA for transfers and level surface gait/stair trial. Upon evaluation, pt presenting with impaired functional mobility d/t decreased strength, decreased endurance, impaired balance and decreased mobility. Pertinent PMHx and current co-morbidities affecting pt's physical performance at time of assessment include: fall, CAD, A fib, HTN, DM, CHF, mixed HLD, ambulatory dysfunction, dementia, gait instability, hearing loss.  Personal factors affecting pt at time of eval include: stairs to enter home, decreased cognition and positive fall history. The following objective measures performed on IE also reveal limitations: Barthel Index: 75/100, Modified Yaniv: 3 (moderate disability) and AM-PAC 6-Clicks: 96/83. Pt's clinical presentation is currently evolving seen in pt's presentation of advanced age, abnormal lab value(s) and ongoing medical assessment. Overall, pt's rehab potential and prognosis to return to PLOF is good as impacted by objective findings, warranting pt to receive further skilled PT interventions to address identified impairments, activity limitation(s), and participation restriction(s). Pt to benefit from continued PT tx to address deficits as defined above and maximize level of functional independent mobility. From PT/mobility standpoint, recommendation at time of d/c would be home with outpatient rehabilitation pending progress in order to facilitate return to PLOF. Barriers to Discharge None   Goals   Patient Goals to go home soon   STG Expiration Date 08/25/23   Short Term Goal #1 In 7-10 days: Increase bilateral LE strength 1/2 grade to facilitate independent mobility, Perform all bed mobility tasks modified independent to decrease caregiver burden, Perform all transfers modified independent to improve independence, Ambulate > 250 ft. with least restrictive assistive device modified independent w/o LOB and w/ normalized gait pattern 100% of the time, Navigate 4 stairs modified independent with unilateral handrail to facilitate return to previous living environment, Increase all balance 1/2 grade to decrease risk for falls and Complete exercise program independently   PT Treatment Day 0   Plan   Treatment/Interventions Functional transfer training;LE strengthening/ROM; Therapeutic exercise;Patient/family training;Equipment eval/education; Bed mobility;Gait training;Elevations; Spoke to nursing   PT Frequency 2-3x/wk   Recommendation   PT Discharge Recommendation Home with outpatient rehabilitation   Equipment Recommended   (TBD) AM-PAC Basic Mobility Inpatient   Turning in Flat Bed Without Bedrails 4   Lying on Back to Sitting on Edge of Flat Bed Without Bedrails 4   Moving Bed to Chair 3   Standing Up From Chair Using Arms 3   Walk in Room 3   Climb 3-5 Stairs With Railing 3   Basic Mobility Inpatient Raw Score 20   Basic Mobility Standardized Score 43.99   Highest Level Of Mobility   -M Goal 6: Walk 10 steps or more   -HLM Achieved 7: Walk 25 feet or more   Modified Yaniv Scale   Modified Cook Scale 3   Barthel Index   Feeding 10   Bathing 0   Grooming Score 5   Dressing Score 10   Bladder Score 10   Bowels Score 10   Toilet Use Score 5   Transfers (Bed/Chair) Score 10   Mobility (Level Surface) Score 10   Stairs Score 5   Barthel Index Score 75           Silvester Ormond, PT, DPT
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
## 2023-08-15 NOTE — QUICK NOTE
Assessment/Plan   Fall  88yo M with h/o dementia, CAD, HTN, paroxysmal a-fib, and T2DM here for concerns of a mechanical fall a few days ago. He was c/o R arm and abdominal pain upon admission. Note unclear history due to him and his wife being poor historians. Imaging studies were negative for acute fracture or significant changes. Currently, the patient appears comfortable and not in distress. Elfrieda Sor to go home.     -Maintain fall precaution. -F/u with PT/OT. CHF  83yo M with concerns for orthopnea and dyspnea upon hospital admission per his wife's history. CXR showed signs of pulmonary edema, and IV Lasix was started. Patient reports feeling well. No rales on exam at this time. ECHO results indicate LV EF of 50% (ECHO 3/2023 showed LV EF of 55%). -Will continue to monitor for symptoms. T2DM  Continues with elevated blood glucose (162 today, 182 yesterday)    -Continue SSI. HTN  Patient with h/o HTN, BP readings have improved since hospital admission.    -Continue Norvasc, lisinopril. Paroxysmal a-fib  Patient with noted h/o paroxysmal a-fib upon arrival. Initial EKG noted with a-fib and slow ventricular rate. HR has improved since hospital admission.    -Continue Norvasc, lisinopril, and Eliquis. -May consider restarting Lopressor. CAD  Patient with noted h/o CAD and multiple stent placements upon arrival.     -Continue aspirin, Eliquis. VTE Pharmacologic Prophylaxis: VTE Score: 3 Moderate Risk (Score 3-4) - Pharmacological DVT Prophylaxis Ordered: apixaban (Eliquis). and aspirin          Patient Centered Rounds: I performed bedside rounds with nursing staff today. Discussions with Specialists or Other Care Team Provider:  None    Education and Discussions with Family / Patient: ***    Time Spent for Care: {Note Time:29515}. More than 50% of total time spent on counseling and coordination of care as described above.     Current Length of Stay: 0 day(s)  Current Patient Status: Observation   Certification Statement: The patient will continue to require additional inpatient hospital stay due to plan noted above  Discharge Plan: {Discharge BCJV:24552}    Code Status: Level 1 - Full Code    Subjective:   83yo M with h/o dementia, CAD, HTN, paroxysmal a-fib, and T2DM on day 1 of hospital admission for concerns of a fall a few days ago, as well as R arm and abdominal pain. XR of R forearm and CT abdomen/pelvis were wnl. His wife also reported episodes of dyspnea and orthopnea upon admission; CXR noted pulmonary edema, and IV Lasix was started. Today, patient was sitting upright, eating breakfast, and reports feeling well. He denies SOB, orthopnea, R arm pain, and n/v. He is eager to go home soon. Objective:     Vitals:   Temp (24hrs), Av.8 °F (36.6 °C), Min:97.5 °F (36.4 °C), Max:98.5 °F (36.9 °C)    Temp:  [97.5 °F (36.4 °C)-98.5 °F (36.9 °C)] 97.7 °F (36.5 °C)  HR:  [52-96] 96  Resp:  [17-20] 20  BP: (142-185)/(70-92) 142/73  SpO2:  [96 %-98 %] 97 %  Body mass index is 29.86 kg/m². Input and Output Summary (last 24 hours): Intake/Output Summary (Last 24 hours) at 8/15/2023 0903  Last data filed at 8/15/2023 0545  Gross per 24 hour   Intake 1570 ml   Output 2000 ml   Net -430 ml       Physical Exam:   Physical Exam  Vitals reviewed. Constitutional:       General: He is not in acute distress. HENT:      Head: Normocephalic and atraumatic. Eyes:      Conjunctiva/sclera: Conjunctivae normal.   Cardiovascular:      Rate and Rhythm: Normal rate. Heart sounds: Normal heart sounds. Pulmonary:      Effort: Pulmonary effort is normal. No respiratory distress. Breath sounds: Normal breath sounds. No wheezing, rhonchi or rales. Neurological:      Mental Status: He is alert and oriented to person, place, and time.            Additional Data:     Labs:  Results from last 7 days   Lab Units 08/15/23  0540 23  1107   WBC Thousand/uL 8.12 7.67   HEMOGLOBIN g/dL 13.1 12.0   HEMATOCRIT % 39.2 36.9   PLATELETS Thousands/uL 169 167   NEUTROS PCT %  --  69   LYMPHS PCT %  --  20   MONOS PCT %  --  7   EOS PCT %  --  3     Results from last 7 days   Lab Units 08/15/23  0540 08/14/23  1107   SODIUM mmol/L 136 136   POTASSIUM mmol/L 4.2 3.8   CHLORIDE mmol/L 104 104   CO2 mmol/L 25 27   BUN mg/dL 26* 30*   CREATININE mg/dL 1.60* 1.63*   ANION GAP mmol/L 7 5   CALCIUM mg/dL 9.7 8.7   ALBUMIN g/dL  --  3.9   TOTAL BILIRUBIN mg/dL  --  0.59   ALK PHOS U/L  --  46   ALT U/L  --  13   AST U/L  --  15   GLUCOSE RANDOM mg/dL 146* 312*         Results from last 7 days   Lab Units 08/15/23  0717 08/14/23  2114 08/14/23  1812   POC GLUCOSE mg/dl 162* 182* 92         Results from last 7 days   Lab Units 08/14/23  1107   LACTIC ACID mmol/L 1.3       Lines/Drains:  Invasive Devices     Peripheral Intravenous Line  Duration           Peripheral IV 08/14/23 Left Antecubital <1 day                  Telemetry:  Telemetry Orders (From admission, onward)             24 Hour Telemetry Monitoring  Continuous x 24 Hours (Telem)        Question:  Reason for 24 Hour Telemetry  Answer:  Arrhythmias requiring acute medical intervention / PPM or ICD malfunction                 Telemetry Reviewed: {AISSATOU Tele Review:08596}  Indication for Continued Telemetry Use: DT's with history of heart disease or abnormal EKG             Imaging: Reviewed radiology reports from this admission including: chest xray, abdominal/pelvic CT and xray(s)    XR chest pa & lateral    Result Date: 8/14/2023  Impression: Pulmonary edema. No pleural effusion. Cardiomegaly. Resident: Milady Fleming, the attending radiologist, have reviewed the images and agree with the final report above.  Workstation performed: [id number]     XR forearm 2 views RIGHT    Result Date: 8/14/2023  Impression: No acute displaced fractures No fracture seen within the shaft of the radius and ulna Workstation performed: [id number]     CT recon only lumbar spine    Result Date: 8/14/2023  Impression: No acute fracture. No traumatic malalignment. Advanced lumbar degenerative changes most notably at L3-L4 where there is severe central canal stenosis. Workstation performed: [id number]     CT abdomen pelvis with contrast    Result Date: 8/14/2023  Impression: No acute CT findings to account for lower abdominal pain. Trabeculation of urinary bladder wall with bladder diverticulosis presumably representing the sequela of chronic bladder outlet obstruction due to prostate disease. Advanced lumbar degenerative changes. Cardiomegaly and extensive abdominal arterial atherosclerotic disease. Workstation performed: [id number]       XR chest pa & lateral    Result Date: 8/14/2023  Impression Pulmonary edema. No pleural effusion. Cardiomegaly. Resident: Sage Gallego, the attending radiologist, have reviewed the images and agree with the final report above. Workstation performed: [id number]        Recent Cultures (last 7 days):         Last 24 Hours Medication List:   Current Facility-Administered Medications   Medication Dose Route Frequency Provider Last Rate   • acetaminophen  650 mg Oral Q6H PRN Gabby Muniz PA-C     • amLODIPine  10 mg Oral Daily Jose MONTANO MD     • apixaban  2.5 mg Oral BID [place] Quant MD CHARMAINE     • aspirin  81 mg Oral Daily Jose MONTANO MD     • finasteride  5 mg Oral Daily Jose MONTANO MD     • furosemide  40 mg Intravenous Daily Jose MONTANO MD     • gabapentin  100 mg Oral BID Jose MONTANO MD     • insulin lispro  1-5 Units Subcutaneous TID AC Jose MONTANO MD     • isosorbide dinitrate  10 mg Oral BID Jose MONTANO MD     • lidocaine  1 patch Topical Daily Gabby Muniz PA-C     • lisinopril  20 mg Oral Daily Ramona Landon MD          Today, Patient Was Seen By: James Clay    **Please Note: This note may have been constructed using a voice recognition system. **

## 2023-08-15 NOTE — ASSESSMENT & PLAN NOTE
Lab Results   Component Value Date    HGBA1C 6.6 (H) 07/19/2023       Recent Labs     08/14/23  1812 08/14/23  2114 08/15/23  0717   POCGLU 92 182* 162*       Blood Sugar Average: Last 72 hrs:  (P) [id number]   Present on admission history of diabetes.   Hold p.o. agents while inpatient
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
## 2023-08-15 NOTE — PLAN OF CARE
Problem: PHYSICAL THERAPY ADULT  Goal: Performs mobility at highest level of function for planned discharge setting. See evaluation for individualized goals. Description: Treatment/Interventions: Functional transfer training, LE strengthening/ROM, Therapeutic exercise, Patient/family training, Equipment eval/education, Bed mobility, Gait training, Elevations, Spoke to nursing  Equipment Recommended:  (TBD)       See flowsheet documentation for full assessment, interventions and recommendations. 8/15/2023 1358 by Michi Caro PT  Note: Prognosis: Good  Problem List: Decreased strength, Decreased mobility, Impaired balance, Decreased endurance  Assessment: Pt is 82 y.o. male seen for PT evaluation on 8/15/2023 [facility] [id number] [facility] w/ Fall. PT was consulted to assess pt's functional mobility and d/c needs. Order placed for PT eval and tx. PTA, pt resides with S.O. in mobile home with 4 DILMA, ambulates with QC prn, +fall history. At time of eval, pt requiring CGA for transfers and level surface gait/stair trial. Upon evaluation, pt presenting with impaired functional mobility d/t decreased strength, decreased endurance, impaired balance and decreased mobility. Pertinent PMHx and current co-morbidities affecting pt's physical performance at time of assessment include: fall, CAD, A fib, HTN, DM, CHF, mixed HLD, ambulatory dysfunction, dementia, gait instability, hearing loss. Personal factors affecting pt at time of eval include: stairs to enter home, decreased cognition and positive fall history. The following objective measures performed on IE also reveal limitations: Barthel Index: 75/100, Modified Logan: 3 (moderate disability) and AM-PAC 6-Clicks: 01/07. Pt's clinical presentation is currently evolving seen in pt's presentation of advanced age, abnormal lab value(s) and ongoing medical assessment.  Overall, pt's rehab potential and prognosis to return to [facility] is good as impacted by objective findings, warranting pt to receive further skilled PT interventions to address identified impairments, activity limitation(s), and participation restriction(s). Pt to benefit from continued PT tx to address deficits as defined above and maximize level of functional independent mobility. From PT/mobility standpoint, recommendation at time of d/c would be home with outpatient rehabilitation pending progress in order to facilitate return to PLOF. Barriers to Discharge: None     PT Discharge Recommendation: Home with outpatient rehabilitation    See flowsheet documentation for full assessment. 8/15/2023 1358 by Priti Chery PT  Note: Prognosis: Good  Problem List: Decreased strength, Decreased mobility, Impaired balance, Decreased endurance  Assessment: Pt is 82 y.o. male seen for PT evaluation on 8/15/2023 s/p admit to [facility] on 8/14/2023 w/ Fall. PT was consulted to assess pt's functional mobility and d/c needs. Order placed for PT eval and tx. PTA, pt resides with S.O. in mobile home with 4 DILMA, ambulates with QC prn, +fall history. At time of eval, pt requiring CGA for transfers and level surface gait/stair trial. Upon evaluation, pt presenting with impaired functional mobility d/t decreased strength, decreased endurance, impaired balance and decreased mobility. Pertinent PMHx and current co-morbidities affecting pt's physical performance at time of assessment include: fall, CAD, A fib, HTN, DM, CHF, mixed HLD, ambulatory dysfunction, dementia, gait instability, hearing loss. Personal factors affecting pt at time of eval include: stairs to enter home, decreased cognition and positive fall history. The following objective measures performed on IE also reveal limitations: Barthel Index: 75/100, Modified [place]: 3 (moderate disability) and AM-PAC 6-Clicks: 88/67.  Pt's clinical presentation is currently evolving seen in pt's presentation of advanced age, abnormal lab value(s) and ongoing medical assessment. Overall, pt's rehab potential and prognosis to return to [facility] is good as impacted by objective findings, warranting pt to receive further skilled PT interventions to address identified impairments, activity limitation(s), and participation restriction(s). Pt to benefit from continued PT tx to address deficits as defined above and maximize level of functional independent mobility. From PT/mobility standpoint, recommendation at time of d/c would be home with outpatient rehabilitation pending progress in order to facilitate return to PLOF. Barriers to Discharge: None     PT Discharge Recommendation: Home with outpatient rehabilitation    See flowsheet documentation for full assessment.

## 2023-08-15 NOTE — OCCUPATIONAL THERAPY NOTE
Occupational Therapy Evaluation      Evelyne Carrollmason    8/15/2023    Patient Active Problem List   Diagnosis    Hypertensive CKD (chronic kidney disease)    Mixed hyperlipidemia due to type 2 diabetes mellitus ([address])    Coronary artery disease involving native coronary artery of native heart without angina pectoris    History of CVA (cerebrovascular accident)    Paroxysmal atrial fibrillation ([address])    Type 2 diabetes mellitus with stage 3b chronic kidney disease, without long-term current use of insulin ([address])    Fall    Ambulatory dysfunction    Other proteinuria    Vitamin D deficiency    Dementia ([address])    Gait instability    Hearing loss    Stage 3b chronic kidney disease (HCC)    HTN (hypertension)    DM (diabetes mellitus) ([address])    CHF (congestive heart failure) ([address])       Past Medical History:   Diagnosis Date    Acute deep vein thrombosis (DVT) of brachial vein of left upper extremity ([address]) 11/24/2017    Acute deep vein thrombosis (DVT) of left peroneal vein ([facility])     Acute ST elevation myocardial infarction [facility])     Aortic valve stenosis     Atrial fibrillation ([facility])     Basilar artery stenosis     Basilar artery stenosis     Basilar artery stenosis 5/4/2020    MRA Head wo contrast (12/13/2019)  IMPRESSION:   Multifocal intracranial atherosclerotic disease with moderate to severe stenosis at the origin of the left M1 segment with additional atherosclerotic disease noted in the distal right M1 and proximal inferior left M2 branches. Moderate to severe stenosis in the proximal and mid basilar artery with nonvisualization of the right intradural vertebral    Benign prostatic hyperplasia with lower urinary tract symptoms     CAD (coronary artery disease)     CAD in native artery 11/24/2017    Underwent cardiac catheterization on 1/30/2020 and had mid and distal LAD stent placed  Cardiac PCI (1/30/2020)  SUMMARY   CORONARY CIRCULATION: Mid LAD: There was a 90 % stenosis at the site of a prior stent.  Distal LAD: There was a 90 % stenosis at the site of a prior stent. Left posterior descending artery: There was a diffuse 50 % stenosis. 1ST LESION INTERVENTIONS: A balloon angioplasty wit    Cerebrovascular small vessel disease 11/12/2020    Chronic kidney disease     Closed fracture of multiple ribs of left side with routine healing 9/3/2020    DM2 (diabetes mellitus, type 2) (HCC)     Elevated troponin 7/19/2021    Herpes zoster without complication 1/73/1177    History of CVA (cerebrovascular accident) 2/21/2019    History of DVT of peroneal vein left lower extremity 12/16/2019    History of transfusion     HLD (hyperlipidemia)     HTN (hypertension)     Infected sebaceous cyst of skin 6/8/2021    Lump in chest 6/1/2021    Middle cerebral artery stenosis     Mild to moderate aortic stenosis 10/9/2018    ECHO (7/2020)  AORTIC VALVE: Leaflets exhibited moderately increased thickness and moderate calcification. Transaortic velocity was increased due to valvular stenosis. There was mild to moderate stenosis. RICHY 1.4cm, mean pressure gradient 11mmHg, peak velocity 2.15m/s There was mild regurgitation. Near syncope 7/19/2021    Other proteinuria 10/8/2019    Proteinuria     Rib fractures (right) 7/31/2020    Symptomatic bradycardia     Transient cerebral ischemia     Vitamin D deficiency        Past Surgical History:   Procedure Laterality Date    CARDIAC CATHETERIZATION      Outcome: successful; last assessed: 02/03/2015    CARDIAC CATHETERIZATION  11/26/2019    CORONARY ANGIOPLASTY WITH STENT PLACEMENT  2008    stent to LAD     HAND SURGERY      thumb    HIP HARDWARE REMOVAL      TOTAL HIP ARTHROPLASTY Right     TOTAL HIP ARTHROPLASTY Bilateral         08/15/23 0757   OT Last Visit   OT Visit Date 08/15/23   Note Type   Note type Evaluation   Additional Comments Pt agreeable to OT eval. Upon arrival pt supine in bed with HOB elevated.    Pain Assessment   Pain Assessment Tool 0-10   Pain Score No Pain Restrictions/Precautions   Weight Bearing Precautions Per Order No   Other Precautions Cognitive; Chair Alarm; Bed Alarm; Fall Risk   Home Living   Type of [facility] One level;Performs ADLs on one level; Able to live on main level with bedroom/bathroom;Stairs to enter with rails  (4 DILMA)   Bathroom Shower/Tub Walk-in shower   Bathroom Toilet Standard   Bathroom Equipment Grab bars in [address]  ("they want me to always use the cane but I can't. It gets in the way")   Prior Function   Level of [place] Independent with ADLs; Independent with functional mobility; Needs assistance with IADLS   Lives With Significant other  (Girlfriend of 15 years)   Receives Help From Family   IADLs Independent with driving;Family/Friend/Other provides meals; Family/Friend/Other provides medication management   Falls in the last 6 months 1 to 4  ("I have had a few others")   Vocational Retired   Lifestyle   Autonomy Patient reporting being independent with ADLs, ambulatory with no AD vs quad cane, and lives with girlfriend in a one level house with 4 DILMA   Reciprocal Relationships Girlfriend and children   Service to Others Retired   Intrinsic Gratification "Sawing wood"   ADL   Eating Assistance 6  Modified independent   Grooming Assistance 6  [address] [facility] 5  Supervision/Setup    [address] [facility] 4  [address] [facility] 5  [address] [facility] 4  [address]   Additional Comments ADL levels based on functional performance during OT eval   Bed Mobility   Supine to Sit 5  Supervision   Additional items Assist x 1;HOB elevated; Bedrails; Increased time required;Verbal cues   Sit to Supine   (DNT: pt seated OOB in recliner at end of session)   Additional Comments Pt denied lightheaded/dizziness   Transfers   Sit to Stand   (CGA) Additional items Assist x 1;Bedrails; Increased time required;Verbal cues   Stand to Sit   (CGA)   Additional items Assist x 1; Armrests; Increased time required;Verbal cues   Toilet transfer   (Pt utilized urinal while standing at recliner. Pt required CGA for standing balance.)   Functional Mobility   Functional Mobility   (CGA)   Additional Comments Pt ambulated in room to recliner with no overt SOB. Pt grossly unsteady   Additional items   (Quad cane)   Balance   Static Sitting Good   Dynamic Sitting Fair +   Static Standing Fair   Dynamic Standing Fair -   Activity Tolerance   Activity Tolerance Patient limited by fatigue   RUE Assessment   RUE Assessment WFL  (full AROM, 4-/5 MMT)   LUE Assessment   LUE Assessment WFL  (full AROM, 4-/5 MMT)   Hand Function   Gross Motor Coordination Functional   Fine Motor Coordination Functional   Sensation   Light Touch No apparent deficits   Vision-Basic Assessment   Current Vision Wears glasses only for reading   Cognition   Overall Cognitive Status Impaired   Arousal/Participation Alert; Responsive; Cooperative   Attention Attends with cues to redirect   Orientation Level Oriented to person;Oriented to place; Disoriented to time;Disoriented to situation  (oriented to month but disoriented to year)   Memory Decreased short term memory;Decreased recall of recent events;Decreased recall of precautions   Following Commands Follows one step commands without difficulty   Assessment   Limitation Decreased ADL status; Decreased UE strength;Decreased Safe judgement during ADL;Decreased cognition;Decreased endurance;Decreased self-care trans;Decreased high-level ADLs   Prognosis Good   Assessment Patient is a 82 y.o. male seen for OT evaluation s/p admit to [facility]  on 8/14/2023 w/Fall. Commorbidities affecting patient's functional performance at time of assessment include: CAD, A-fib, HTN, DM, dementia, and CHF.  Orders placed for OT evaluation and treatment and up and OOB as tolerated . Performed at least two patient identifiers during session including name and wristband. Prior to admission, Patient reporting being independent with ADLs, ambulatory with no AD vs quad cane, and lives with girlfriend in a one level house with 4 DILMA. Personal factors affecting patient at time of initial evaluation include: steps to enter, difficulty performing ADLs and difficulty performing IADLs. Upon evaluation, patient requires supervision assist for UB ADLs, minimal  assist for LB ADLs, transfers and functional ambulation in room and bathroom with contact guard assist, with the use of TRAKLOK [place]. Patient is oriented to name,, oriented to place,, disoriented to time, and disoriented to situation, and presents with limited ability to recall information after a brief period of time (short term memory) / working memory . Occupational performance is affected by the following deficits: orientation, decreased muscle strength, dynamic sit/ stand balance deficit with poor standing tolerance time for self care and functional mobility, decreased activity tolerance, impaired memory, impaired problem solving, decreased safety awareness and delayed righting and equilibrium reactions. Patient to benefit from continued Occupational Therapy treatment while in the hospital to address deficits as defined above and maximize level of functional independence with ADLs and functional mobility. Occupational Performance areas to address include: bathing/ shower, dressing, toilet hygiene, transfer to all surfaces, functional ambulation, medication routine/ management, IADLS: Household maintenance, IADLs: safety procedures and IADLs: meal prep/ clean up. From OT standpoint, recommendation at time of d/c would be Home with outpatient rehabilitation. Goals   Patient Goals to go home soon   Plan   Treatment Interventions ADL retraining;Functional transfer training;UE strengthening/ROM; Endurance training;Cognitive reorientation;Patient/family training; Compensatory technique education; Activityengagement   Goal Expiration Date 08/25/23   OT Treatment Day 0   OT Frequency 1-2x/wk   Recommendation   OT Discharge Recommendation Home with outpatient rehabilitation  (May benefit from fitness to drive assessment)   AM-PAC Daily Activity Inpatient   Lower Body Dressing 3   Bathing 3   Toileting 3   Upper Body Dressing 3   Grooming 4   Eating 4   Daily Activity Raw Score 20   Daily Activity Standardized Score (Calc for Raw Score >=11) 42.03   AM-PAC Applied Cognition Inpatient   Following a Speech/Presentation 3   Understanding Ordinary Conversation 4   Taking Medications 2   Remembering Where Things Are Placed or Put Away 2   Remembering List of 4-5 Errands 2   Taking Care of Complicated Tasks 2   Applied Cognition Raw Score 15   Applied Cognition Standardized Score 33.54   Modified [place] Scale   Modified [place] Scale 4   End of Consult   Patient Position at End of Consult Bedside chair;Bed/Chair alarm activated; All needs within reach   Nurse Communication Nurse aware of consult     GOALS:    *ADL transfers with (I) for inc'd independence with ADLs/purposeful tasks    *UB ADL with (I) for inc'd independence with self cares    *LB ADL with (I) using AE prn for inc'd independence with self cares    *Toileting with (I) for clothing management and hygiene for return to PLOF with personal care    *Increase stand tolerance x 5  m for inc'd tolerance with standing purposeful tasks    *Participate in 10m UE therex to increase overall stamina/activity tolerance for purposeful tasks    *Bed mobility- (I) for inc'd independence to manage own comfort and initiate EOB & OOB purposeful tasks    *Patient will verbalize 3 safety awareness/ principles to prevent falls in the home setting. *Patient will increase OOB/sitting tolerance to 2-4 hours per day to increase participation in self-care and leisure tasks with no s/s of exertion. Spencer Ogden, OTD, OTR/L

## 2023-08-15 NOTE — PLAN OF CARE
Pt received discharge instructions and verbalized understanding of same .  Left unit with all belongings

## 2023-08-15 NOTE — ASSESSMENT & PLAN NOTE
Wt Readings from Last 3 Encounters:   08/14/23 94.4 kg (208 lb 1.8 oz)   05/30/23 92.5 kg (204 lb)   03/14/23 99.8 kg (220 lb)     Patient is poor historian. Wife reports patient has been having episodes of dyspnea and orthopnea. Noted bilateral rales on exam.  Chest x-ray noted with pulmonary edema  Start on p.o.  Lasix 40 mg daily  2D echo obtained showing hypokinesis of inferolateral walls; appreciate cardiology input and recommended outpatient follow-up with cardiology
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
## 2023-08-15 NOTE — TELEPHONE ENCOUNTER
----- Message from Keyla Martins, [address] [facility] sent at 8/15/2023  4:05 PM EDT -----  Regarding: Hospital Follow-up  Cardiology Follow-up:    Patient clinical visit in 1 week at the [address] [address] location: [address] [place] [address] office. .    Schedule visit with Cardio [place] Providers: Jayda Hutton or first available provider. Type of Visit: VISIT TYPE: in-person office visit. Test ordered:    Additional Details: CHF

## 2023-08-15 NOTE — PLAN OF CARE
Problem: OCCUPATIONAL THERAPY ADULT  Goal: Performs self-care activities at highest level of function for planned discharge setting. See evaluation for individualized goals. Description: Treatment Interventions: ADL retraining, Functional transfer training, UE strengthening/ROM, Endurance training, Cognitive reorientation, Patient/family training, Compensatory technique education, Activityengagement          See flowsheet documentation for full assessment, interventions and recommendations. Note: Limitation: Decreased ADL status, Decreased UE strength, Decreased Safe judgement during ADL, Decreased cognition, Decreased endurance, Decreased self-care trans, Decreased high-level ADLs  Prognosis: Good  Assessment: Patient is a 82 y.o. male seen for OT evaluation s/p admit to [facility]  on 8/14/2023 w/Fall. Commorbidities affecting patient's functional performance at time of assessment include: CAD, A-fib, HTN, DM, dementia, and CHF. Orders placed for OT evaluation and treatment and up and OOB as tolerated . Performed at least two patient identifiers during session including name and wristband. Prior to admission, Patient reporting being independent with ADLs, ambulatory with no AD vs quad cane, and lives with girlfriend in a one level house with 4 DILMA. Personal factors affecting patient at time of initial evaluation include: steps to enter, difficulty performing ADLs and difficulty performing IADLs. Upon evaluation, patient requires supervision assist for UB ADLs, minimal  assist for LB ADLs, transfers and functional ambulation in room and bathroom with contact guard assist, with the use of [employer]. Patient is oriented to name,, oriented to place,, disoriented to time, and disoriented to situation, and presents with limited ability to recall information after a brief period of time (short term memory) / working memory .   Occupational performance is affected by the following deficits: orientation, decreased muscle strength, dynamic sit/ stand balance deficit with poor standing tolerance time for self care and functional mobility, decreased activity tolerance, impaired memory, impaired problem solving, decreased safety awareness and delayed righting and equilibrium reactions. Patient to benefit from continued Occupational Therapy treatment while in the hospital to address deficits as defined above and maximize level of functional independence with ADLs and functional mobility. Occupational Performance areas to address include: bathing/ shower, dressing, toilet hygiene, transfer to all surfaces, functional ambulation, medication routine/ management, IADLS: Household maintenance, IADLs: safety procedures and IADLs: meal prep/ clean up. From OT standpoint, recommendation at time of d/c would be Home with outpatient rehabilitation.      OT Discharge Recommendation: Home with outpatient rehabilitation (May benefit from fitness to drive assessment)     Chadd Huertas OTKATIE, OTR/L

## 2023-08-16 ENCOUNTER — TRANSITIONAL CARE MANAGEMENT (OUTPATIENT)
Age: 88
End: 2023-08-16

## 2023-08-16 DIAGNOSIS — I10 ESSENTIAL HYPERTENSION: Chronic | ICD-10-CM

## 2023-08-16 DIAGNOSIS — N40.1 BPH WITH OBSTRUCTION/LOWER URINARY TRACT SYMPTOMS: ICD-10-CM

## 2023-08-16 DIAGNOSIS — N13.8 BPH WITH OBSTRUCTION/LOWER URINARY TRACT SYMPTOMS: ICD-10-CM

## 2023-08-16 DIAGNOSIS — G89.4 CHRONIC PAIN SYNDROME: ICD-10-CM

## 2023-08-16 LAB
ATRIAL RATE: 66 BPM
ATRIAL RATE: 77 BPM
ATRIAL RATE: 83 BPM
QRS AXIS: 87 DEGREES
QRS AXIS: 89 DEGREES
QRS AXIS: 90 DEGREES
QRSD INTERVAL: 162 MS
QRSD INTERVAL: 164 MS
QRSD INTERVAL: 166 MS
QT INTERVAL: 478 MS
QT INTERVAL: 536 MS
QT INTERVAL: 536 MS
QTC INTERVAL: 501 MS
QTC INTERVAL: 508 MS
QTC INTERVAL: 526 MS
T WAVE AXIS: -10 DEGREES
T WAVE AXIS: 268 DEGREES
T WAVE AXIS: 90 DEGREES
VENTRICULAR RATE: 54 BPM
VENTRICULAR RATE: 58 BPM
VENTRICULAR RATE: 66 BPM

## 2023-08-16 PROCEDURE — 93010 ELECTROCARDIOGRAM REPORT: CPT | Performed by: INTERNAL MEDICINE

## 2023-08-16 RX ORDER — AMLODIPINE BESYLATE 10 MG/1
10 TABLET ORAL DAILY
Qty: 90 TABLET | Refills: 3 | Status: SHIPPED | OUTPATIENT
Start: 2023-08-16

## 2023-08-16 RX ORDER — FINASTERIDE 5 MG/1
5 TABLET, FILM COATED ORAL DAILY
Qty: 90 TABLET | Refills: 3 | Status: SHIPPED | OUTPATIENT
Start: 2023-08-16

## 2023-08-16 RX ORDER — GABAPENTIN 100 MG/1
CAPSULE ORAL
Qty: 180 CAPSULE | Refills: 3 | Status: SHIPPED | OUTPATIENT
Start: 2023-08-16

## 2023-08-18 ENCOUNTER — OFFICE VISIT (OUTPATIENT)
Dept: CARDIOLOGY CLINIC | Facility: CLINIC | Age: 88
End: 2023-08-18
Payer: MEDICARE

## 2023-08-18 VITALS
SYSTOLIC BLOOD PRESSURE: 124 MMHG | HEART RATE: 45 BPM | DIASTOLIC BLOOD PRESSURE: 64 MMHG | BODY MASS INDEX: 29.49 KG/M2 | OXYGEN SATURATION: 96 % | WEIGHT: 206 LBS | RESPIRATION RATE: 16 BRPM | HEIGHT: 70 IN

## 2023-08-18 DIAGNOSIS — I48.0 PAROXYSMAL ATRIAL FIBRILLATION (HCC): Chronic | ICD-10-CM

## 2023-08-18 DIAGNOSIS — I25.10 CORONARY ARTERY DISEASE INVOLVING NATIVE CORONARY ARTERY OF NATIVE HEART WITHOUT ANGINA PECTORIS: Primary | ICD-10-CM

## 2023-08-18 DIAGNOSIS — F02.80 ALZHEIMER'S DEMENTIA OF OTHER ONSET, UNSPECIFIED DEMENTIA SEVERITY, UNSPECIFIED WHETHER BEHAVIORAL, PSYCHOTIC, OR MOOD DISTURBANCE OR ANXIETY (HCC): ICD-10-CM

## 2023-08-18 DIAGNOSIS — I10 PRIMARY HYPERTENSION: ICD-10-CM

## 2023-08-18 DIAGNOSIS — G30.8 ALZHEIMER'S DEMENTIA OF OTHER ONSET, UNSPECIFIED DEMENTIA SEVERITY, UNSPECIFIED WHETHER BEHAVIORAL, PSYCHOTIC, OR MOOD DISTURBANCE OR ANXIETY (HCC): ICD-10-CM

## 2023-08-18 PROCEDURE — 99213 OFFICE O/P EST LOW 20 MIN: CPT | Performed by: INTERNAL MEDICINE

## 2023-08-18 NOTE — PROGRESS NOTES
Cardiology Follow Up    Ferna Apgar  1935  [id number]  [facility]  [address] [place] [address] [phone]    1. Coronary artery disease involving native coronary artery of native heart without angina pectoris    2. Primary hypertension    3. Paroxysmal atrial fibrillation (HCC)    4. Alzheimer's dementia of other onset, unspecified dementia severity, unspecified whether behavioral, psychotic, or mood disturbance or anxiety ([address])          Interval History: 82-year-old man who went to the hospital because of shortness of breath although he had also had a fall prior to that. He did not lose consciousness. He has chronic atrial fibrillation with a sometimes slow ventricular response. He has stable coronary artery disease and a fairly well-preserved ejection fraction (50%) with mild to moderate aortic stenosis. He was found to have evidence for congestive failure and was diuresed and now he says his breathing is good. He is here with his daughter. He has some degree of dementia as he does not know the month or the president. He is now on Lasix 40 mg daily.   Last potassium 3 days ago was normal    Patient Active Problem List   Diagnosis   • Hypertensive CKD (chronic kidney disease)   • Mixed hyperlipidemia due to type 2 diabetes mellitus ([address])   • Coronary artery disease involving native coronary artery of native heart without angina pectoris   • History of CVA (cerebrovascular accident)   • Paroxysmal atrial fibrillation (HCC)   • Type 2 diabetes mellitus with stage 3b chronic kidney disease, without long-term current use of insulin ([address])   • Fall   • Ambulatory dysfunction   • Other proteinuria   • Vitamin D deficiency   • Dementia (HCC)   • Gait instability   • Hearing loss   • Stage 3b chronic kidney disease ([address])   • HTN (hypertension)   • DM (diabetes mellitus) ([address]) • CHF (congestive heart failure) ([facility])     Past Medical History:   Diagnosis Date   • Acute deep vein thrombosis (DVT) of brachial vein of left upper extremity ([address]) 11/24/2017   • Acute deep vein thrombosis (DVT) of left peroneal vein ([facility])    • Acute ST elevation myocardial infarction [facility])    • Aortic valve stenosis    • Atrial fibrillation ([facility])    • Basilar artery stenosis    • Basilar artery stenosis    • Basilar artery stenosis 5/4/2020    MRA Head wo contrast (12/13/2019)  IMPRESSION:   Multifocal intracranial atherosclerotic disease with moderate to severe stenosis at the origin of the left M1 segment with additional atherosclerotic disease noted in the distal right M1 and proximal inferior left M2 branches.   Moderate to severe stenosis in the proximal and mid basilar artery with nonvisualization of the right intradural vertebral   • Benign prostatic hyperplasia with lower urinary tract symptoms    • CAD (coronary artery disease)    • CAD in native artery 11/24/2017    Underwent cardiac catheterization on 1/30/2020 and had mid and distal LAD stent placed  Cardiac PCI (1/30/2020)  SUMMARY   CORONARY CIRCULATION: Mid LAD: There was a 90 % stenosis at the site of a prior stent. Distal LAD: There was a 90 % stenosis at the site of a prior stent.  Left posterior descending artery: There was a diffuse 50 % stenosis.   1ST LESION INTERVENTIONS: A balloon angioplasty wit   • Cerebrovascular small vessel disease 11/12/2020   • Chronic kidney disease    • Closed fracture of multiple ribs of left side with routine healing 9/3/2020   • DM2 (diabetes mellitus, type 2) ([address])    • Elevated troponin 7/19/2021   • Herpes zoster without complication 0/07/0344   • History of CVA (cerebrovascular accident) 2/21/2019   • History of DVT of peroneal vein left lower extremity 12/16/2019   • History of transfusion    • HLD (hyperlipidemia)    • HTN (hypertension)    • Infected sebaceous cyst of skin 6/8/2021   • Lump in chest 6/1/2021   • Middle cerebral artery stenosis    • Mild to moderate aortic stenosis 10/9/2018    ECHO (7/2020)  AORTIC VALVE: Leaflets exhibited moderately increased thickness and moderate calcification. Transaortic velocity was increased due to valvular stenosis. There was mild to moderate stenosis. RICHY 1.4cm, mean pressure gradient 11mmHg, peak velocity 2.15m/s There was mild regurgitation. • Near syncope 7/19/2021   • Other proteinuria 10/8/2019   • Proteinuria    • Rib fractures (right) 7/31/2020   • Symptomatic bradycardia    • Transient cerebral ischemia    • Vitamin D deficiency      Social History     Socioeconomic History   • Marital status:      Spouse name: Not on file   • Number of children: Not on file   • Years of education: Not on file   • Highest education level: Not on file   Occupational History   • Not on file   Tobacco Use   • Smoking status: Never   • Smokeless tobacco: Never   Vaping Use   • Vaping Use: Never used   Substance and Sexual Activity   • Alcohol use: Never   • Drug use: No   • Sexual activity: Not Currently     Partners: Female     Birth control/protection: None   Other Topics Concern   • Not on file   Social History Narrative    Active advance directive (as per [employer])     No advance directive on file (as per [employer])      Social Determinants of Health     Financial Resource Strain: Low Risk  (1/19/2023)    Overall Financial Resource Strain (CARDIA)    • Difficulty of Paying Living Expenses: Not hard at all   Food Insecurity: Not on file   Transportation Needs: No Transportation Needs (1/19/2023)    PRAPARE - Transportation    • Lack of Transportation (Medical): No    • Lack of Transportation (Non-Medical):  No   Physical Activity: Inactive (5/11/2021)    Exercise Vital Sign    • Days of Exercise per Week: 0 days    • Minutes of Exercise per Session: 0 min   Stress: No Stress Concern Present (5/11/2021)    [address] Questionnaire    • Feeling of Stress :  Only a little   Social Connections: Not on file   Intimate Partner Violence: Not on file   Housing Stability: Not on file      Family History   Problem Relation Age of Onset   • Emphysema Father    • Emphysema Sister      Past Surgical History:   Procedure Laterality Date   • CARDIAC CATHETERIZATION      Outcome: successful; last assessed: 02/03/2015   • CARDIAC CATHETERIZATION  11/26/2019   • CORONARY ANGIOPLASTY WITH STENT PLACEMENT  2008    stent to LAD    • HAND SURGERY      thumb   • HIP HARDWARE REMOVAL     • TOTAL HIP ARTHROPLASTY Right    • TOTAL HIP ARTHROPLASTY Bilateral        Current Outpatient Medications:   •  acetaminophen (TYLENOL) 500 mg tablet, Take 500 mg by mouth every 6 (six) hours as needed for mild pain, Disp: , Rfl:   •  amLODIPine (NORVASC) 10 mg tablet, TAKE 1 TABLET BY MOUTH  DAILY, Disp: 90 tablet, Rfl: 3  •  apixaban (ELIQUIS) 2.5 mg, Take 1 tablet (2.5 mg total) by mouth 2 (two) times a day, Disp: 180 tablet, Rfl: 3  •  aspirin (ECOTRIN LOW STRENGTH) 81 mg EC tablet, Take 1 tablet (81 mg total) by mouth daily, Disp: , Rfl:   •  Blood Glucose Monitoring Suppl (ONE TOUCH ULTRA MINI) w/Device KIT, Use daily, Disp: 1 kit, Rfl: 0  •  cholecalciferol (VITAMIN D3) 1,000 units tablet, Take 1,000 Units by mouth daily, Disp: , Rfl:   •  finasteride (PROSCAR) 5 mg tablet, TAKE 1 TABLET BY MOUTH  DAILY, Disp: 90 tablet, Rfl: 3  •  furosemide (LASIX) 40 mg tablet, Take 1 tablet (40 mg total) by mouth daily, Disp: 30 tablet, Rfl: 0  •  gabapentin (NEURONTIN) 100 mg capsule, TAKE 1 CAPSULE BY MOUTH TWICE  DAILY, Disp: 180 capsule, Rfl: 3  •  glipiZIDE (GLUCOTROL XL) 2.5 mg 24 hr tablet, TAKE 1 TABLET BY MOUTH DAILY, Disp: 90 tablet, Rfl: 3  •  isosorbide dinitrate (ISORDIL) 10 mg tablet, TAKE 1 TABLET BY MOUTH  TWICE DAILY, Disp: 180 tablet, Rfl: 3  •  lidocaine (LIDODERM) 5 %, Apply 1 patch topically over 12 hours daily Remove & Discard patch within 12 hours or as directed by MD Do not start before August 16, 2023., Disp: 15 patch, Rfl: 0  •  lisinopril (ZESTRIL) 20 mg tablet, TAKE 1 TABLET BY MOUTH  DAILY, Disp: 90 tablet, Rfl: 3  •  metoprolol tartrate (LOPRESSOR) 25 mg tablet, TAKE ONE-HALF TABLET BY  MOUTH EVERY 12 HOURS, Disp: 90 tablet, Rfl: 3  •  Omega-3 Fatty Acids (FISH OIL CONCENTRATE PO), Take 1 tablet by mouth daily, Disp: , Rfl:   •  vitamin B-12 (CYANOCOBALAMIN) 100 MCG tablet, Take 1,000 mcg by mouth daily, Disp: , Rfl:   Allergies   Allergen Reactions   • Celecoxib Other (See Comments)     Per pt does NOT remember type of reaction or severity level   • Hydromorphone Other (See Comments)     Per pt does NOT remember type of reaction or severity level   • Pravastatin Hives   • Statins Other (See Comments)     Per pt does NOT remember type of reaction or severity level       Labs:  Admission on 08/14/2023, Discharged on 08/15/2023   Component Date Value   • WBC 08/14/2023 7.67    • RBC 08/14/2023 3.84 (L)    • Hemoglobin 08/14/2023 12.0    • Hematocrit 08/14/2023 36.9    • MCV 08/14/2023 96    • MCH 08/14/2023 31.3    • MCHC 08/14/2023 32.5    • RDW 08/14/2023 13.8    • MPV 08/14/2023 10.2    • Platelets 99/06/2826 167    • nRBC 08/14/2023 0    • Neutrophils Relative 08/14/2023 69    • Immat GRANS % 08/14/2023 0    • Lymphocytes Relative 08/14/2023 20    • Monocytes Relative 08/14/2023 7    • Eosinophils Relative 08/14/2023 3    • Basophils Relative 08/14/2023 1    • Neutrophils Absolute 08/14/2023 5.28    • Immature Grans Absolute 08/14/2023 0.02    • Lymphocytes Absolute 08/14/2023 1.50    • Monocytes Absolute 08/14/2023 0.57    • Eosinophils Absolute 08/14/2023 0.25    • Basophils Absolute 08/14/2023 0.05    • Sodium 08/14/2023 136    • Potassium 08/14/2023 3.8    • Chloride 08/14/2023 104    • CO2 08/14/2023 27    • ANION GAP 08/14/2023 5    • BUN 08/14/2023 30 (H)    • Creatinine 08/14/2023 1.63 (H)    • Glucose 08/14/2023 312 (H)    • Calcium 08/14/2023 8.7    • eGFR 08/14/2023 37    • Total Bilirubin 08/14/2023 0.59    • Bilirubin, Direct 08/14/2023 0.11    • Alkaline Phosphatase 08/14/2023 46    • AST 08/14/2023 15    • ALT 08/14/2023 13    • Total Protein 08/14/2023 6.9    • Albumin 08/14/2023 3.9    • Lipase 08/14/2023 20    • LACTIC ACID 08/14/2023 1.3    • Ventricular Rate 08/14/2023 58    • Atrial Rate 08/14/2023 83    • QRSD Interval 08/14/2023 166    • QT Interval 08/14/2023 536    • QTC Interval 08/14/2023 526    • QRS Axis 08/14/2023 90    • T Wave Axis 08/14/2023 268    • Color, UA 08/14/2023 Light Yellow    • Clarity, UA 08/14/2023 Clear    • Specific Gravity, UA 08/14/2023 1.034 (H)    • pH, UA 08/14/2023 6.0    • Leukocytes, UA 08/14/2023 Negative    • Nitrite, UA 08/14/2023 Negative    • Protein, UA 08/14/2023 300 (3+) (A)    • Glucose, UA 08/14/2023 500 (1/2%) (A)    • Ketones, UA 08/14/2023 Negative    • Urobilinogen, UA 08/14/2023 <2.0    • Bilirubin, UA 08/14/2023 Negative    • Occult Blood, UA 08/14/2023 Small (A)    • Ventricular Rate 08/14/2023 54    • Atrial Rate 08/14/2023 77    • QRSD Interval 08/14/2023 164    • QT Interval 08/14/2023 536    • QTC Interval 08/14/2023 508    • QRS Axis 08/14/2023 89    • T Wave Axis 08/14/2023 90    • hs TnI 0hr 08/14/2023 56 (H)    • RBC, UA 08/14/2023 1-2    • WBC, UA 08/14/2023 1-2    • Epithelial Cells 08/14/2023 None Seen    • Bacteria, UA 08/14/2023 Occasional    • MUCUS THREADS 08/14/2023 Occasional (A)    • Hyaline Casts, UA 08/14/2023 3-5 (A)    • hs TnI 2hr 08/14/2023 56 (H)    • Delta 2hr hsTnI 08/14/2023 0    • hs TnI 4hr 08/14/2023 61 (H)    • Delta 4hr hsTnI 08/14/2023 5    • AV area peak ambreen 08/15/2023 1.4    • AV peak gradient 08/15/2023 73    • LA size 08/15/2023 4.5    • Aortic valve mean veloci* 08/15/2023 19.80    • Triscuspid Valve Regurgi* 08/15/2023 42.0    • Tricuspid valve peak reg* 08/15/2023 3.23    • LVPWd 08/15/2023 0.90    • Left Atrium Area-systoli* 08/15/2023 26.3    • Left Atrium Area-systoli* 08/15/2023 26.9    • MV E' Tissue Velocity Se* 08/15/2023 6    • Tricuspid annular plane * 08/15/2023 1.80    • TR Peak Booker 08/15/2023 3.2    • IVSd 08/15/2023 5.45    • LV DIASTOLIC VOLUME (MOD* 50/05/8112 125    • LEFT VENTRICLE SYSTOLIC * 16/61/1552 82    • Left ventricular stroke * 08/15/2023 43.00    • AV Deceleration Time 08/15/2023 1,359    • A4C EF 08/15/2023 32    • LA length (A2C) 08/15/2023 6.20    • LVIDd 08/15/2023 5.10    • IVS 08/15/2023 1.3    • LVIDS 08/15/2023 4.30    • FS 08/15/2023 16    • LA volume (BP) 08/15/2023 87    • Asc Ao 08/15/2023 3.4    • Ao root 08/15/2023 3.70    • RVID d 08/15/2023 4.2    • LVOT mn grad 08/15/2023 1.0    • AV area by cont VTI 08/15/2023 1.5    • AV regurgitation pressur* 08/15/2023 394    • AV mean gradient 08/15/2023 18    • AV LVOT peak gradient 08/15/2023 2    • MV valve area p 1/2 meth* 08/15/2023 4.58    • E wave deceleration time 08/15/2023 167    • LVOT diameter 08/15/2023 2.2    • LVOT peak booker 08/15/2023 0.78    • LVOT peak VTI 08/15/2023 17.14    • Aortic valve peak veloci* 08/15/2023 2.68    • Ao VTI 08/15/2023 57.21    • LVOT stroke volume 08/15/2023 65.12    • AV peak gradient 08/15/2023 30    • MV Peak E Booker 08/15/2023 132    • MV Peak A Booker 08/15/2023 0.44    • ANDRADE A4C 08/15/2023 28.1    • RAA A4C 08/15/2023 24.5    • MV stenosis pressure 1/2* 08/15/2023 48    • LVOT Cardiac Output 08/15/2023 5.93    • LVOT stroke volume index 08/15/2023 40.10    • LVOT Cardiac Index 08/15/2023 2.80    • LVSV, 2D 08/15/2023 43    • LVOT area 08/15/2023 3.80    • DVI 08/15/2023 0.29    • AV valve area 08/15/2023 1.14    • LV EF 08/15/2023 50    • PASP 08/15/2023 40.0    • BNP 08/14/2023 1,599 (H)    • POC Glucose 08/14/2023 92    • Ventricular Rate 08/14/2023 66    • Atrial Rate 08/14/2023 66    • QRSD Interval 08/14/2023 162    • QT Interval 08/14/2023 478    • QTC Interval 08/14/2023 501    • QRS Axis 08/14/2023 87    • T Wave Axis 08/14/2023 -10 • POC Glucose 08/14/2023 182 (H)    • WBC 08/15/2023 8.12    • RBC 08/15/2023 4.16    • Hemoglobin 08/15/2023 13.1    • Hematocrit 08/15/2023 39.2    • MCV 08/15/2023 94    • MCH 08/15/2023 31.5    • MCHC 08/15/2023 33.4    • RDW 08/15/2023 13.7    • Platelets 02/61/1891 169    • MPV 08/15/2023 9.4    • Sodium 08/15/2023 136    • Potassium 08/15/2023 4.2    • Chloride 08/15/2023 104    • CO2 08/15/2023 25    • ANION GAP 08/15/2023 7    • BUN 08/15/2023 26 (H)    • Creatinine 08/15/2023 1.60 (H)    • Glucose 08/15/2023 146 (H)    • Glucose, Fasting 08/15/2023 146 (H)    • Calcium 08/15/2023 9.7    • eGFR 08/15/2023 38    • POC Glucose 08/15/2023 162 (H)    • POC Glucose 08/15/2023 213 (H)    Appointment on 07/19/2023   Component Date Value   • Hemoglobin A1C 07/19/2023 6.6 (H)    • EAG 07/19/2023 143      Imaging: Echo complete w/ contrast if indicated    Result Date: 8/15/2023  Narrative: •  Left Ventricle: Left ventricular cavity size is normal. Wall thickness is normal. The left ventricular ejection fraction is 50%. Systolic function is low normal. •  The following segments are hypokinetic: basal inferior, basal inferolateral, mid inferior and mid inferolateral. •  Right Ventricle: Right ventricular cavity size is normal. Systolic function is normal. •  Left Atrium: The atrium is mildly dilated. •  Right Atrium: The atrium is mildly dilated. •  Aortic Valve: There is mild to moderate regurgitation. There is moderate stenosis. Mean aortic valve gradient of 26 mmHg •  Mitral Valve: There is mild to moderate regurgitation. •  Tricuspid Valve: There is mild regurgitation. •  Pulmonary Artery: The estimated pulmonary artery systolic pressure is 08.3 mmHg. The pulmonary artery systolic pressure is mildly increased. XR chest pa & lateral    Result Date: 8/14/2023  Narrative: CHEST INDICATION:   antonio. COMPARISON: Chest radiograph dated October 14, 2021. EXAM PERFORMED/VIEWS:  XR CHEST PA & LATERAL Images: 3. FINDINGS: Cardiomegaly present. Slight haziness diffusely in bilateral lungs with prominent interstitial markings . No pneumothorax or pleural effusion. No acute osseous abnormality. Impression: Pulmonary edema. No pleural effusion. Cardiomegaly. Resident: Edis Banks, the attending radiologist, have reviewed the images and agree with the final report above. Workstation performed: [id number]     XR forearm 2 views RIGHT    Result Date: 8/14/2023  Narrative: RIGHT FOREARM INDICATION:   arm injury. COMPARISON:  None VIEWS:  XR FOREARM 2 VW RIGHT Images: 3 FINDINGS: There is no acute fracture or dislocation. Radiocapitellar arthritis Humero ulnar arthritis. Marked proliferative changes from the No lytic or blastic osseous lesion. Soft tissues are unremarkable. Impression: No acute displaced fractures No fracture seen within the shaft of the radius and ulna Workstation performed: [id number]     CT recon only lumbar spine    Result Date: 8/14/2023  Narrative: CT RECON ONLY LUMBAR SPINE (NO CHARGE) INDICATION:   low back pain. COMPARISON:  None TECHNIQUE: Axial CT examination of the lumbar spine was obtained utilizing reconstructed images from CT of the chest, abdomen and pelvis performed the same day. Images were reformatted in the sagittal and coronal planes. This examination, like all CT scans performed in the [facility], was performed utilizing techniques to minimize radiation dose exposure, including the use of iterative reconstruction and automated exposure control. FINDINGS: ALIGNMENT: Straightening of lumbar lordosis. No subluxation. No compression deformity. VERTEBRAE: No acute fracture. No destructive osseous lesion. 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  DEGENERATIVE CHANGES: Advanced multilevel lumbar degenerative change with disc space narrowing at every level in the visualized lower thoracic and lumbar spine, most severe at T12-L1, L2-L3, L3-L4, and L4-L5. Also noted is multilevel disc osteophyte complex formation and facet spondylosis. Central canal stenosis is severe at L3-L4. Foraminal stenosis is severe bilaterally at L2-L3, L4-L5, and L5-S1., PREVERTEBRAL AND PARASPINAL SOFT TISSUES: Normal.     Impression: No acute fracture. No traumatic malalignment. Advanced lumbar degenerative changes most notably at L3-L4 where there is severe central canal stenosis. Workstation performed: [id number]     CT abdomen pelvis with contrast    Result Date: 8/14/2023  Narrative: CT ABDOMEN AND PELVIS WITH IV CONTRAST INDICATION:   lower abdominal pain. COMPARISON: October 14, 2021 TECHNIQUE:  CT examination of the abdomen and pelvis was performed. Multiplanar 2D reformatted images were created from the source data. This examination, like all CT scans performed in the [facility], was performed utilizing techniques to minimize radiation dose exposure, including the use of iterative reconstruction and automated exposure control. Radiation dose length product (DLP) for this visit:  970 mGy-cm IV Contrast:  100 mL of iohexol (OMNIPAQUE) Enteric Contrast:  Enteric contrast was not administered. FINDINGS: ABDOMEN LOWER CHEST: Cardiomegaly. Coronary artery calcification. LIVER/BILIARY TREE:  Unremarkable. GALLBLADDER:  No calcified gallstones. No pericholecystic inflammatory change. SPLEEN:  Unremarkable. PANCREAS:  Unremarkable. ADRENAL GLANDS:  Unremarkable. KIDNEYS/URETERS: Bilateral renal cysts. No urinary tract calculus or hydronephrosis. Symmetric nephrograms. There are a few circumscribed renal lesions which do not measure simple fluid density, most notably a 14 mm lesion exophytic in the interpolar posterior right kidney. This lesion measured simple fluid and 11 mm in size in October 2021; therefore the finding is most likely a complex.  However, in the unlikely event that this represents a weakly enhancing very slowly growing solid lesion, it remains unlikely to be a clinically significant lesion in an 82year-old patient. STOMACH AND BOWEL: Colonic diverticulosis with no evidence for acute diverticulitis. No bowel obstruction. APPENDIX: A normal appendix was visualized. ABDOMINOPELVIC CAVITY:  No ascites. No pneumoperitoneum. No lymphadenopathy. VESSELS: Heavy atherosclerotic vascular calcifications. PELVIS REPRODUCTIVE ORGANS: Not well seen due to metal artifact over the pelvis resulting from bilateral hip arthroplasty. URINARY BLADDER: Urinary bladder trabeculation and diverticulosis probably related to chronic bladder outlet obstruction as a result of prostate disease. ABDOMINAL WALL/INGUINAL REGIONS: Small umbilical hernia with omental/peritoneal fatty tissue in the hernia sac, similar from October 2021. OSSEOUS STRUCTURES: Healed lower right rib fractures. Advanced lumbar degenerative changes. No acute fracture or destructive osseous lesion. Impression: No acute CT findings to account for lower abdominal pain. Trabeculation of urinary bladder wall with bladder diverticulosis presumably representing the sequela of chronic bladder outlet obstruction due to prostate disease. Advanced lumbar degenerative changes. Cardiomegaly and extensive abdominal arterial atherosclerotic disease. Workstation performed: [id number]       Review of Systems:  Review of Systems    Physical Exam:  He is in no distress. Blood pressure 124/64. Heart rate 48 and irregular. Lungs clear. No neck vein distention. Grade 2-3 systolic ejection murmur at the base with A2 preserved and no appreciable carotid delay. Liver not enlarged. No edema. Discussion/Summary:    1. Diagnoses as noted above with recent congestive failure-now stable    Recommendations:    1. Continue Lasix  2. Report if increasing shortness of breath  3.   Keep regular follow-up appointment with Dr. Nuno Verma, MD

## 2023-09-06 ENCOUNTER — RA CDI HCC (OUTPATIENT)
Dept: OTHER | Facility: HOSPITAL | Age: 88
End: 2023-09-06

## 2023-09-06 NOTE — PROGRESS NOTES
[address] [facility] coding opportunities          Chart Reviewed number of suggestions sent to Provider: 1  E11.29, R80.9     Patients Insurance     Medicare Insurance: [employer]

## 2023-09-20 ENCOUNTER — TELEPHONE (OUTPATIENT)
Dept: NEPHROLOGY | Facility: CLINIC | Age: 88
End: 2023-09-20

## 2023-09-21 ENCOUNTER — TELEPHONE (OUTPATIENT)
Dept: NEPHROLOGY | Facility: CLINIC | Age: 88
End: 2023-09-21

## 2023-09-21 NOTE — TELEPHONE ENCOUNTER
I called [employer] (Automated System) @ [phone] to check eligible for the patient and as of 9/21/2023 the patient plan is paid up until 10/1/2023.  Nathan Gallo, .

## 2023-09-22 ENCOUNTER — APPOINTMENT (OUTPATIENT)
Age: 88
End: 2023-09-22
Payer: MEDICARE

## 2023-09-22 ENCOUNTER — OFFICE VISIT (OUTPATIENT)
Age: 88
End: 2023-09-22
Payer: MEDICARE

## 2023-09-22 VITALS
TEMPERATURE: 97.1 F | WEIGHT: 206 LBS | HEART RATE: 52 BPM | DIASTOLIC BLOOD PRESSURE: 70 MMHG | OXYGEN SATURATION: 96 % | BODY MASS INDEX: 29.56 KG/M2 | RESPIRATION RATE: 18 BRPM | SYSTOLIC BLOOD PRESSURE: 126 MMHG

## 2023-09-22 DIAGNOSIS — E55.9 VITAMIN D DEFICIENCY: ICD-10-CM

## 2023-09-22 DIAGNOSIS — E11.22 TYPE 2 DIABETES MELLITUS WITH STAGE 3B CHRONIC KIDNEY DISEASE, WITHOUT LONG-TERM CURRENT USE OF INSULIN (HCC): Primary | Chronic | ICD-10-CM

## 2023-09-22 DIAGNOSIS — N18.32 TYPE 2 DIABETES MELLITUS WITH STAGE 3B CHRONIC KIDNEY DISEASE, WITHOUT LONG-TERM CURRENT USE OF INSULIN (HCC): Primary | Chronic | ICD-10-CM

## 2023-09-22 DIAGNOSIS — I13.0 HYPERTENSIVE HEART AND KIDNEY DISEASE WITH CHRONIC SYSTOLIC CONGESTIVE HEART FAILURE AND STAGE 3B CHRONIC KIDNEY DISEASE (HCC): ICD-10-CM

## 2023-09-22 DIAGNOSIS — I50.22 HYPERTENSIVE HEART AND KIDNEY DISEASE WITH CHRONIC SYSTOLIC CONGESTIVE HEART FAILURE AND STAGE 3B CHRONIC KIDNEY DISEASE (HCC): ICD-10-CM

## 2023-09-22 DIAGNOSIS — E11.69 MIXED HYPERLIPIDEMIA DUE TO TYPE 2 DIABETES MELLITUS: Chronic | ICD-10-CM

## 2023-09-22 DIAGNOSIS — N18.32 HYPERTENSIVE KIDNEY DISEASE WITH STAGE 3B CHRONIC KIDNEY DISEASE (HCC): ICD-10-CM

## 2023-09-22 DIAGNOSIS — E78.2 MIXED HYPERLIPIDEMIA DUE TO TYPE 2 DIABETES MELLITUS: Chronic | ICD-10-CM

## 2023-09-22 DIAGNOSIS — N18.32 STAGE 3B CHRONIC KIDNEY DISEASE (HCC): ICD-10-CM

## 2023-09-22 DIAGNOSIS — R80.8 OTHER PROTEINURIA: ICD-10-CM

## 2023-09-22 DIAGNOSIS — N18.32 HYPERTENSIVE HEART AND KIDNEY DISEASE WITH CHRONIC SYSTOLIC CONGESTIVE HEART FAILURE AND STAGE 3B CHRONIC KIDNEY DISEASE (HCC): ICD-10-CM

## 2023-09-22 DIAGNOSIS — I12.9 HYPERTENSIVE KIDNEY DISEASE WITH STAGE 3B CHRONIC KIDNEY DISEASE (HCC): ICD-10-CM

## 2023-09-22 PROBLEM — E11.9 DM (DIABETES MELLITUS) (HCC): Status: RESOLVED | Noted: 2023-08-14 | Resolved: 2023-09-22

## 2023-09-22 PROBLEM — I10 HTN (HYPERTENSION): Chronic | Status: ACTIVE | Noted: 2023-08-14

## 2023-09-22 PROBLEM — I50.9 CHF (CONGESTIVE HEART FAILURE) (HCC): Chronic | Status: ACTIVE | Noted: 2023-08-14

## 2023-09-22 LAB
25(OH)D3 SERPL-MCNC: 54.5 NG/ML (ref 30–100)
ANION GAP SERPL CALCULATED.3IONS-SCNC: 7 MMOL/L
BACTERIA UR QL AUTO: ABNORMAL /HPF
BASOPHILS # BLD AUTO: 0.06 THOUSANDS/ÂΜL (ref 0–0.1)
BASOPHILS NFR BLD AUTO: 1 % (ref 0–1)
BILIRUB UR QL STRIP: NEGATIVE
BUN SERPL-MCNC: 43 MG/DL (ref 5–25)
CALCIUM SERPL-MCNC: 9.3 MG/DL (ref 8.4–10.2)
CHLORIDE SERPL-SCNC: 103 MMOL/L (ref 96–108)
CLARITY UR: CLEAR
CO2 SERPL-SCNC: 26 MMOL/L (ref 21–32)
COLOR UR: YELLOW
CREAT SERPL-MCNC: 2.27 MG/DL (ref 0.6–1.3)
CREAT UR-MCNC: 168.8 MG/DL
EOSINOPHIL # BLD AUTO: 0.39 THOUSAND/ÂΜL (ref 0–0.61)
EOSINOPHIL NFR BLD AUTO: 5 % (ref 0–6)
ERYTHROCYTE [DISTWIDTH] IN BLOOD BY AUTOMATED COUNT: 13.7 % (ref 11.6–15.1)
GFR SERPL CREATININE-BSD FRML MDRD: 25 ML/MIN/1.73SQ M
GLUCOSE P FAST SERPL-MCNC: 130 MG/DL (ref 65–99)
GLUCOSE UR STRIP-MCNC: NEGATIVE MG/DL
GRAN CASTS #/AREA URNS LPF: ABNORMAL /[LPF]
HCT VFR BLD AUTO: 40.8 % (ref 36.5–49.3)
HGB BLD-MCNC: 13.6 G/DL (ref 12–17)
HGB UR QL STRIP.AUTO: NEGATIVE
HYALINE CASTS #/AREA URNS LPF: ABNORMAL /LPF
IMM GRANULOCYTES # BLD AUTO: 0.02 THOUSAND/UL (ref 0–0.2)
IMM GRANULOCYTES NFR BLD AUTO: 0 % (ref 0–2)
KETONES UR STRIP-MCNC: NEGATIVE MG/DL
LEUKOCYTE ESTERASE UR QL STRIP: NEGATIVE
LYMPHOCYTES # BLD AUTO: 1.81 THOUSANDS/ÂΜL (ref 0.6–4.47)
LYMPHOCYTES NFR BLD AUTO: 23 % (ref 14–44)
MCH RBC QN AUTO: 31.9 PG (ref 26.8–34.3)
MCHC RBC AUTO-ENTMCNC: 33.3 G/DL (ref 31.4–37.4)
MCV RBC AUTO: 96 FL (ref 82–98)
MICROALBUMIN UR-MCNC: 1241.3 MG/L
MICROALBUMIN/CREAT 24H UR: 735 MG/G CREATININE (ref 0–30)
MONOCYTES # BLD AUTO: 0.49 THOUSAND/ÂΜL (ref 0.17–1.22)
MONOCYTES NFR BLD AUTO: 6 % (ref 4–12)
NEUTROPHILS # BLD AUTO: 5.01 THOUSANDS/ÂΜL (ref 1.85–7.62)
NEUTS SEG NFR BLD AUTO: 65 % (ref 43–75)
NITRITE UR QL STRIP: NEGATIVE
NON-SQ EPI CELLS URNS QL MICRO: ABNORMAL /HPF
NRBC BLD AUTO-RTO: 0 /100 WBCS
PH UR STRIP.AUTO: 6 [PH]
PHOSPHATE SERPL-MCNC: 3.7 MG/DL (ref 2.3–4.1)
PLATELET # BLD AUTO: 181 THOUSANDS/UL (ref 149–390)
PMV BLD AUTO: 10.6 FL (ref 8.9–12.7)
POTASSIUM SERPL-SCNC: 4.7 MMOL/L (ref 3.5–5.3)
PROT UR STRIP-MCNC: ABNORMAL MG/DL
PTH-INTACT SERPL-MCNC: 94.5 PG/ML (ref 12–88)
RBC # BLD AUTO: 4.26 MILLION/UL (ref 3.88–5.62)
RBC #/AREA URNS AUTO: ABNORMAL /HPF
SODIUM SERPL-SCNC: 136 MMOL/L (ref 135–147)
SP GR UR STRIP.AUTO: 1.02 (ref 1–1.03)
URATE SERPL-MCNC: 8.5 MG/DL (ref 3.5–8.5)
UROBILINOGEN UR STRIP-ACNC: <2 MG/DL
WBC # BLD AUTO: 7.78 THOUSAND/UL (ref 4.31–10.16)
WBC #/AREA URNS AUTO: ABNORMAL /HPF

## 2023-09-22 PROCEDURE — 85025 COMPLETE CBC W/AUTO DIFF WBC: CPT

## 2023-09-22 PROCEDURE — 80048 BASIC METABOLIC PNL TOTAL CA: CPT

## 2023-09-22 PROCEDURE — 36415 COLL VENOUS BLD VENIPUNCTURE: CPT

## 2023-09-22 PROCEDURE — 83970 ASSAY OF PARATHORMONE: CPT

## 2023-09-22 PROCEDURE — 82306 VITAMIN D 25 HYDROXY: CPT

## 2023-09-22 PROCEDURE — 82043 UR ALBUMIN QUANTITATIVE: CPT

## 2023-09-22 PROCEDURE — 84550 ASSAY OF BLOOD/URIC ACID: CPT

## 2023-09-22 PROCEDURE — 84100 ASSAY OF PHOSPHORUS: CPT

## 2023-09-22 PROCEDURE — 82570 ASSAY OF URINE CREATININE: CPT

## 2023-09-22 PROCEDURE — 99214 OFFICE O/P EST MOD 30 MIN: CPT | Performed by: INTERNAL MEDICINE

## 2023-09-22 PROCEDURE — 81001 URINALYSIS AUTO W/SCOPE: CPT

## 2023-09-22 RX ORDER — FUROSEMIDE 40 MG/1
40 TABLET ORAL DAILY
Qty: 90 TABLET | Refills: 1 | Status: SHIPPED | OUTPATIENT
Start: 2023-09-22 | End: 2024-03-20

## 2023-09-22 NOTE — PATIENT INSTRUCTIONS
Chronic Hypertension   AMBULATORY CARE:   Hypertension is considered chronic  when it continues for 3 months or longer. Hypertension that continues causes your heart to work much harder than normal, which may lead to heart damage. Even if you have hypertension for years, lifestyle changes, medicines, or both may help lower your blood pressure. Call your local emergency number [phone] in the [address]) or have someone call if:   You have chest pain. You have any of the following signs of a heart attack:      Squeezing, pressure, or pain in your chest    You may  also have any of the following:     Discomfort or pain in your back, neck, jaw, stomach, or arm    Shortness of breath    Nausea or vomiting    Lightheadedness or a sudden cold sweat    You become confused or have difficulty speaking. You suddenly feel lightheaded or have trouble breathing. Seek care immediately if:   You have a severe headache or vision loss. You have weakness in an arm or leg. Call your doctor or cardiologist if:   You feel faint, dizzy, confused, or drowsy. You have been taking your blood pressure medicine but your pressure is higher than your provider says it should be. You have questions or concerns about your condition or care. Treatment for chronic hypertension  may include medicine to lower your blood pressure and cholesterol levels. A low cholesterol level helps prevent heart disease and makes it easier to control your blood pressure. Heart disease can make your blood pressure harder to control. You may also need to make lifestyle changes. What you need to know about the stages of hypertension:  Your healthcare provider will give you a blood pressure goal based on your age, health, and risk for cardiovascular disease. The following are general guidelines on the stages of hypertension:  Normal blood pressure is 119/79 or lower .  Your provider may only check your blood pressure each year if it stays at a normal level.    Elevated blood pressure is 120/79 to 129/79 . This is sometimes called prehypertension. Your provider may suggest lifestyle changes to help lower your blood pressure to a normal level. He or she may then check it again in 3 to 6 months. Stage 1 hypertension is 130/80  to 139/89 . Your provider may recommend lifestyle changes, medication, and checks every 3 to 6 months until your blood pressure is controlled. Stage 2 hypertension is 140/90 or higher . Your provider will recommend lifestyle changes and have you take 2 kinds of hypertension medicines. You will also need to have your blood pressure checked monthly until it is controlled. Manage chronic hypertension:   Check your blood pressure at home. Do not smoke, have caffeine, or exercise for at least 30 minutes before you check your blood pressure. Sit and rest for 5 minutes before you check your blood pressure. Extend your arm and support it on a flat surface. Your arm should be at the same level as your heart. Follow the directions that came with your blood pressure monitor. Check your blood pressure 2 times, 1 minute apart, before you take your medicine in the morning. Also check your blood pressure before your evening meal. Keep a record of your readings and bring it to your follow-up visits. Your healthcare provider may use the readings to make changes to your treatment plan. Manage any other health conditions you have. Health conditions such as diabetes can increase your risk for hypertension. Follow your provider's instructions and take all your medicines as directed. Talk to your provider about any new health conditions you have recently developed. Ask about all medicines. Certain medicines can increase your blood pressure. Examples include oral birth control pills, decongestants, herbal supplements, and NSAIDs, such as ibuprofen. Your provider can tell you which medicines are safe for you to take.  This includes prescription and over-the-counter medicines. Lifestyle changes you can make to lower your blood pressure: Your provider may want you to make more lifestyle changes if you are having trouble controlling your blood pressure. This may feel difficult over time, especially if you think you are making good changes but your pressure is still high. It might help to focus on one new change at a time. For example, try to add 1 more day of exercise, or exercise for an extra 10 minutes on 2 days. Small changes can make a big difference. Your healthcare provider can also refer you to specialists such as a dietitian who can help you make small changes. Your family members may be included in helping you learn to create lifestyle changes, such as the following:     Limit sodium (salt) as directed. Too much sodium can affect your fluid balance. Check labels to find low-sodium or no-salt-added foods. Some low-sodium foods use potassium salts for flavor. Too much potassium can also cause health problems. Your provider will tell you how much sodium and potassium are safe for you to have in a day. He or she may recommend that you limit sodium to 2,300 mg a day. Follow the meal plan recommended by your provider. A dietitian or your provider can give you more information on low-sodium plans or the DASH (Dietary Approaches to Stop Hypertension) eating plan. The DASH plan is low in sodium, processed sugar, unhealthy fats, and total fat. It is high in potassium, calcium, and fiber. These can be found in vegetables, fruit, and whole-grain foods. Be physically active throughout the day. Physical activity, such as exercise, can help control your blood pressure and your weight. Be physically active for at least 30 minutes per day, on most days of the week. Include aerobic activity, such as walking or riding a bicycle. Also include strength training at least 2 times each week.  Your provider can help you create a physical activity plan. Decrease stress. This may help lower your blood pressure. Learn ways to relax, such as deep breathing or listening to music. Limit alcohol as directed. Alcohol can increase your blood pressure. A drink of alcohol is 12 ounces of beer, 5 ounces of wine, or 1½ ounces of liquor. Your provider can help you set daily and weekly drink limits. He or she may recommend no alcohol if your blood pressure stays higher than goal even with medicine or other measures. Ask your provider for information if you need help to quit. Do not smoke. Nicotine and other chemicals in cigarettes and cigars can increase your blood pressure and also cause lung damage. Ask your provider for information if you currently smoke and need help to quit. E-cigarettes or smokeless tobacco still contain nicotine. Talk to your provider before you use these products. Follow up with your doctor or cardiologist as directed: You will need to return to have your blood pressure checked and to have other lab tests done. Write down your questions so you remember to ask them during your visits. © Copyright [employer] 2023 Information is for End User's use only and may not be sold, redistributed or otherwise used for commercial purposes. The above information is an  only. It is not intended as medical advice for individual conditions or treatments. Talk to your doctor, nurse or pharmacist before following any medical regimen to see if it is safe and effective for you.

## 2023-09-22 NOTE — PROGRESS NOTES
Name: Mohit Martines      : 1935      MRN: [id number]  Encounter Provider: Claudio Mariano DO  Encounter Date: 2023   Encounter department: [address] [facility]     1. Type 2 diabetes mellitus with stage 3b chronic kidney disease, without long-term current use of insulin ([address])  2. Mixed hyperlipidemia due to type 2 diabetes mellitus ([address])  3. 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 Continue medication as prescribed. Follow-up with cardiology and nephrology. Has advanced age with dementia. Prognosis is guarded for the future. Getting harder to take care of him at home due to the expected decline at his age and with his multiple medical comorbidities. Refill of furosemide was sent to the pharmacy. His blood sugars have been controlled. We will repeat labs before next appointment. - furosemide (LASIX) 40 mg tablet; Take 1 tablet (40 mg total) by mouth daily  Dispense: 90 tablet; Refill: 1   - Hemoglobin A1C; Future  - Lipid panel; Future       Return in about 4 months (around 2024) for Subsequent AWV. Subjective      Patient presents for follow-up. Has been a GI still cannot provide much history. Sounds like daughter might be looking to place him. At the end of the appointment she handed me an [place] 51 form to fill out. There was a lot of issues once he saw plastic surgery. The ulcerated skin lesion on his nose. He was given a referral to dermatology and Mohs surgery yet never heard anything. Daughter is not even sure she would want to put him through this. She was going to talk to the family. Most recent kidney function is stable. Follows with cardiology and nephrology. Labs were done today. Blood sugars are controlled. Review of Systems   Unable to perform ROS: Dementia     Objective     /70 (BP Location: Left arm, Patient Position: Sitting, Cuff Size: Standard)   Pulse (!) 52   Temp (!) 97.1 °F (36.2 °C) (Temporal)   Resp 18   Wt 93.4 kg (206 lb)   SpO2 96%   BMI 29.56 kg/m²     Physical Exam  Constitutional:       General: He is not in acute distress. Appearance: He is not ill-appearing. HENT:      Nose:      Comments: Ulcerated skin lesion bridge of nose  Cardiovascular:      Rate and Rhythm: Normal rate and regular rhythm. Heart sounds: No murmur heard. Pulmonary:      Effort: Pulmonary effort is normal. No respiratory distress. Breath sounds: No wheezing. Abdominal:      General: Bowel sounds are normal. There is no distension. Tenderness: There is no abdominal tenderness. Musculoskeletal:      Right lower leg: No edema. Left lower leg: No edema. Neurological:      Mental Status: He is alert. Mental status is at baseline.        Barby Pennington DO

## 2023-09-25 ENCOUNTER — TELEPHONE (OUTPATIENT)
Age: 88
End: 2023-09-25

## 2023-09-25 NOTE — TELEPHONE ENCOUNTER
Spoke to daughter. Medical evaluation form was completed.   Made a copy to scan, original in reception area [facility]'s folder for   n/c

## 2023-09-26 ENCOUNTER — TELEPHONE (OUTPATIENT)
Dept: NEPHROLOGY | Facility: CLINIC | Age: 88
End: 2023-09-26

## 2023-09-27 ENCOUNTER — OFFICE VISIT (OUTPATIENT)
Dept: NEPHROLOGY | Facility: CLINIC | Age: 88
End: 2023-09-27
Payer: MEDICARE

## 2023-09-27 VITALS
HEIGHT: 70 IN | HEART RATE: 45 BPM | SYSTOLIC BLOOD PRESSURE: 180 MMHG | BODY MASS INDEX: 29.63 KG/M2 | TEMPERATURE: 97.9 F | DIASTOLIC BLOOD PRESSURE: 74 MMHG | WEIGHT: 207 LBS | OXYGEN SATURATION: 98 % | RESPIRATION RATE: 18 BRPM

## 2023-09-27 DIAGNOSIS — E55.9 VITAMIN D DEFICIENCY: ICD-10-CM

## 2023-09-27 DIAGNOSIS — R80.8 OTHER PROTEINURIA: ICD-10-CM

## 2023-09-27 DIAGNOSIS — N18.4 STAGE 4 CHRONIC KIDNEY DISEASE (HCC): Primary | ICD-10-CM

## 2023-09-27 DIAGNOSIS — I12.9 HYPERTENSIVE KIDNEY DISEASE WITH STAGE 4 CHRONIC KIDNEY DISEASE (HCC): Chronic | ICD-10-CM

## 2023-09-27 DIAGNOSIS — N18.4 HYPERTENSIVE KIDNEY DISEASE WITH STAGE 4 CHRONIC KIDNEY DISEASE (HCC): Chronic | ICD-10-CM

## 2023-09-27 PROCEDURE — 99214 OFFICE O/P EST MOD 30 MIN: CPT | Performed by: INTERNAL MEDICINE

## 2023-09-27 NOTE — PROGRESS NOTES
NEPHROLOGY OFFICE FOLLOW UP  Luciana Robles 82 y.o. male MRN: [id number]    Encounter: [id number] DATE: 9/27/2023    REASON FOR VISIT: Luciana Robles is a 82 y.o. male returns to Nephrology office on 9/27/2023 for further management of chronic kidney disease. HPI:    This is a 61-year-old male with a past medical history of diabetes type 2, hypertension, coronary artery disease, returns to Nephrology office for further management of CKD stage 4. Patient's daughter-Barrie was also present at the time of consultation and history was also obtained from her and all the questions were answered. Upon review of old medical records patient's new baseline creatinine seems to be fluctuating around 1.9-2.1. Patient and his daughter has attended [facility] class in the past and looking at overall clinical picture, patient would not be ideal candidate for home dialysis and would consider deferring patient to AV fistula creation once GFR goes below 20. Patient was admitted to [facility] in August 2023 after fall and chest x-ray also showed pulmonary edema and patient was started on Lasix 40 mg p.o. daily. Patient has underlying coronary artery disease and had underwent cardiac catheterization on 1/30/2020 and had mid and distal LAD stent placed. Patient continued to have shortness of breath with minimal activity and currently been followed by cardiologist and was told that he will not get better in the future due to underlying heart condition. Patient has hypertension and has been compliant with his antihypertensive medications. According to patient he has been checking blood pressure on regular basis at home and his blood pressure at home is under well control.   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  Patient does not have any abdominal symptoms either. Patient has underlying diabetes type 2 and according to patient's daughter, diabetes seems to be under good control at this point . Patient was also previously found to have proteinuria which was suspected due to underlying diabetic nephropathy on top of hypertensive nephrosclerosis. Patient takes lisinopril. Patient was also found to have vitamin-D deficiency and currently taking cholecalciferol 1000 Units PO daily. Patient takes all the medications as prescribed and denies use of NSAIDs. REVIEW OF SYSTEMS:    Review of Systems   Constitutional: Negative for chills and fever. HENT: Negative for nosebleeds and sore throat. Eyes: Negative for photophobia and pain. Respiratory: Negative for choking and wheezing. Cardiovascular: Negative for chest pain and palpitations. Gastrointestinal: Negative for blood in stool. Genitourinary: Negative for hematuria. Musculoskeletal: Negative for neck stiffness. Skin: Negative for pallor. Neurological: Negative for seizures and syncope. Psychiatric/Behavioral: Negative for confusion and suicidal ideas.          PAST MEDICAL HISTORY:  Past Medical History:   Diagnosis Date   • Acute deep vein thrombosis (DVT) of brachial vein of left upper extremity ([address]) 11/24/2017   • Acute deep vein thrombosis (DVT) of left peroneal vein (HCC)    • Acute ST elevation myocardial infarction [facility])    • Aortic valve stenosis    • Atrial fibrillation (HCC)    • Basilar artery stenosis    • Basilar artery stenosis    • Basilar artery stenosis 5/4/2020    MRA Head wo contrast (12/13/2019)  IMPRESSION:   Multifocal intracranial atherosclerotic disease with moderate to severe stenosis at the origin of the left M1 segment with additional atherosclerotic disease noted in the distal right M1 and proximal inferior left M2 branches.   Moderate to severe stenosis in the proximal and mid basilar artery with nonvisualization of the right intradural vertebral • Benign prostatic hyperplasia with lower urinary tract symptoms    • CAD (coronary artery disease)    • CAD in native artery 11/24/2017    Underwent cardiac catheterization on 1/30/2020 and had mid and distal LAD stent placed  Cardiac PCI (1/30/2020)  SUMMARY   CORONARY CIRCULATION: Mid LAD: There was a 90 % stenosis at the site of a prior stent. Distal LAD: There was a 90 % stenosis at the site of a prior stent. Left posterior descending artery: There was a diffuse 50 % stenosis.   1ST LESION INTERVENTIONS: A balloon angioplasty wit   • Cerebrovascular small vessel disease 11/12/2020   • Chronic kidney disease    • Closed fracture of multiple ribs of left side with routine healing 9/3/2020   • DM2 (diabetes mellitus, type 2) ([address])    • Elevated troponin 7/19/2021   • Herpes zoster without complication 1/63/6352   • History of CVA (cerebrovascular accident) 2/21/2019   • History of DVT of peroneal vein left lower extremity 12/16/2019   • History of transfusion    • HLD (hyperlipidemia)    • HTN (hypertension)    • Infected sebaceous cyst of skin 6/8/2021   • Lump in chest 6/1/2021   • Middle cerebral artery stenosis    • Mild to moderate aortic stenosis 10/9/2018    ECHO (7/2020)  AORTIC VALVE: Leaflets exhibited moderately increased thickness and moderate calcification. Transaortic velocity was increased due to valvular stenosis. There was mild to moderate stenosis. RICHY 1.4cm, mean pressure gradient 11mmHg, peak velocity 2.15m/s There was mild regurgitation.    • Near syncope 7/19/2021   • Other proteinuria 10/8/2019   • Proteinuria    • Rib fractures (right) 7/31/2020   • Symptomatic bradycardia    • Transient cerebral ischemia    • Vitamin D deficiency        PAST SURGICAL HISTORY:  Past Surgical History:   Procedure Laterality Date   • CARDIAC CATHETERIZATION      Outcome: successful; last assessed: 02/03/2015   • CARDIAC CATHETERIZATION  11/26/2019   • CORONARY ANGIOPLASTY WITH STENT PLACEMENT  2008    stent to LAD    • HAND SURGERY      thumb   • HIP HARDWARE REMOVAL     • TOTAL HIP ARTHROPLASTY Right    • TOTAL HIP ARTHROPLASTY Bilateral        SOCIAL HISTORY:  Social History     Substance and Sexual Activity   Alcohol Use Never     Social History     Substance and Sexual Activity   Drug Use Never     Social History     Tobacco Use   Smoking Status Never   • Passive exposure: Never   Smokeless Tobacco Never       FAMILY HISTORY:  Family History   Problem Relation Age of Onset   • Emphysema Father    • Emphysema Sister        ALLERGY:  Allergies   Allergen Reactions   • Celecoxib Other (See Comments) and Hives     Per pt does NOT remember type of reaction or severity level   • Hydromorphone Other (See Comments) and Hives     Per pt does NOT remember type of reaction or severity level   • Pravastatin Hives   • Statins Other (See Comments) and Hives     Per pt does NOT remember type of reaction or severity level       MEDICATIONS:    Current Outpatient Medications:   •  acetaminophen (TYLENOL) 500 mg tablet, Take 500 mg by mouth every 6 (six) hours as needed for mild pain, Disp: , Rfl:   •  amLODIPine (NORVASC) 10 mg tablet, TAKE 1 TABLET BY MOUTH  DAILY, Disp: 90 tablet, Rfl: 3  •  apixaban (ELIQUIS) 2.5 mg, Take 1 tablet (2.5 mg total) by mouth 2 (two) times a day, Disp: 180 tablet, Rfl: 3  •  aspirin (ECOTRIN LOW STRENGTH) 81 mg EC tablet, Take 1 tablet (81 mg total) by mouth daily, Disp: , Rfl:   •  Blood Glucose Monitoring Suppl (ONE TOUCH ULTRA MINI) w/Device KIT, Use daily, Disp: 1 kit, Rfl: 0  •  cholecalciferol (VITAMIN D3) 1,000 units tablet, Take 1,000 Units by mouth daily, Disp: , Rfl:   •  finasteride (PROSCAR) 5 mg tablet, TAKE 1 TABLET BY MOUTH  DAILY, Disp: 90 tablet, Rfl: 3  •  furosemide (LASIX) 40 mg tablet, Take 1 tablet (40 mg total) by mouth daily, Disp: 90 tablet, Rfl: 1  •  gabapentin (NEURONTIN) 100 mg capsule, TAKE 1 CAPSULE BY MOUTH TWICE  DAILY, Disp: 180 capsule, Rfl: 3  •  glipiZIDE (GLUCOTROL XL) 2.5 mg 24 hr tablet, TAKE 1 TABLET BY MOUTH DAILY, Disp: 90 tablet, Rfl: 3  •  isosorbide dinitrate (ISORDIL) 10 mg tablet, TAKE 1 TABLET BY MOUTH  TWICE DAILY, Disp: 180 tablet, Rfl: 3  •  lisinopril (ZESTRIL) 20 mg tablet, TAKE 1 TABLET BY MOUTH  DAILY, Disp: 90 tablet, Rfl: 3  •  metoprolol tartrate (LOPRESSOR) 25 mg tablet, TAKE ONE-HALF TABLET BY  MOUTH EVERY 12 HOURS, Disp: 90 tablet, Rfl: 3  •  Omega-3 Fatty Acids (FISH OIL CONCENTRATE PO), Take 1 tablet by mouth daily, Disp: , Rfl:   •  vitamin B-12 (CYANOCOBALAMIN) 100 MCG tablet, Take 1,000 mcg by mouth daily, Disp: , Rfl:     PHYSICAL EXAM:  Vitals:    09/27/23 1050   BP: (!) 180/74   Pulse: (!) 45   Resp: 18   Temp: 97.9 °F (36.6 °C)   TempSrc: Temporal   SpO2: 98%   Weight: 93.9 kg (207 lb)   Height: 5' 10" (1.778 m)     Body mass index is 29.7 kg/m². Physical Exam  Constitutional:       General: He is not in acute distress. HENT:      Right Ear: External ear normal.   Eyes:      General: No scleral icterus. Conjunctiva/sclera:      Right eye: No hemorrhage. Neck:      Thyroid: No thyromegaly. Vascular: No JVD. Cardiovascular:      Rate and Rhythm: Normal rate. Heart sounds: Murmur heard. Pulmonary:      Effort: Pulmonary effort is normal. No accessory muscle usage or respiratory distress. Breath sounds: No wheezing. Abdominal:      General: There is no distension. Musculoskeletal:      Right ankle: No swelling. Left ankle: No swelling. Skin:     General: Skin is warm. Coloration: Skin is not jaundiced. Neurological:      Mental Status: He is alert. He is not disoriented. Psychiatric:         Speech: He is communicative. Behavior: Behavior is not combative.          LAB RESULTS:  Results for orders placed or performed in visit on 09/22/23   CBC and differential   Result Value Ref Range    WBC 7.78 4.31 - 10.16 Thousand/uL    RBC 4.26 3.88 - 5.62 Million/uL    Hemoglobin 13.6 12.0 - 17.0 g/dL    Hematocrit 40.8 36.5 - 49.3 %    MCV 96 82 - 98 fL    MCH 31.9 26.8 - 34.3 pg    MCHC 33.3 31.4 - 37.4 g/dL    RDW 13.7 11.6 - 15.1 %    MPV 10.6 8.9 - 12.7 fL    Platelets 107 301 - 171 Thousands/uL    nRBC 0 /100 WBCs    Neutrophils Relative 65 43 - 75 %    Immat GRANS % 0 0 - 2 %    Lymphocytes Relative 23 14 - 44 %    Monocytes Relative 6 4 - 12 %    Eosinophils Relative 5 0 - 6 %    Basophils Relative 1 0 - 1 %    Neutrophils Absolute 5.01 1.85 - 7.62 Thousands/µL    Immature Grans Absolute 0.02 0.00 - 0.20 Thousand/uL    Lymphocytes Absolute 1.81 0.60 - 4.47 Thousands/µL    Monocytes Absolute 0.49 0.17 - 1.22 Thousand/µL    Eosinophils Absolute 0.39 0.00 - 0.61 Thousand/µL    Basophils Absolute 0.06 0.00 - 0.10 Thousands/µL   Basic metabolic panel   Result Value Ref Range    Sodium 136 135 - 147 mmol/L    Potassium 4.7 3.5 - 5.3 mmol/L    Chloride 103 96 - 108 mmol/L    CO2 26 21 - 32 mmol/L    ANION GAP 7 mmol/L    BUN 43 (H) 5 - 25 mg/dL    Creatinine 2.27 (H) 0.60 - 1.30 mg/dL    Glucose, Fasting 130 (H) 65 - 99 mg/dL    Calcium 9.3 8.4 - 10.2 mg/dL    eGFR 25 ml/min/1.73sq m   Urinalysis with microscopic   Result Value Ref Range    Color, UA Yellow     Clarity, UA Clear     Specific Gravity, UA 1.021 1.003 - 1.030    pH, UA 6.0 4.5, 5.0, 5.5, 6.0, 6.5, 7.0, 7.5, 8.0    Leukocytes, UA Negative Negative    Nitrite, UA Negative Negative    Protein,  (3+) (A) Negative mg/dl    Glucose, UA Negative Negative mg/dl    Ketones, UA Negative Negative mg/dl    Urobilinogen, UA <2.0 <2.0 mg/dl mg/dl    Bilirubin, UA Negative Negative    Occult Blood, UA Negative Negative    RBC, UA None Seen None Seen, 1-2 /hpf    WBC, UA 1-2 None Seen, 1-2 /hpf    Epithelial Cells Occasional None Seen, Occasional /hpf    Bacteria, UA Occasional None Seen, Occasional /hpf    Hyaline Casts, UA 0-3 (A) None Seen /lpf    Granular Casts, UA 10-25 (A) (none)   Microalbumin / creatinine urine ratio   Result Value Ref Range    Creatinine, Ur 168.8 Reference range not established. mg/dL    Albumin,U,Random 1,241.3 (H) <20.0 mg/L    Albumin Creat Ratio 735 (H) 0 - 30 mg/g creatinine   Phosphorus   Result Value Ref Range    Phosphorus 3.7 2.3 - 4.1 mg/dL   Uric acid   Result Value Ref Range    Uric Acid 8.5 3.5 - 8.5 mg/dL   PTH, intact   Result Value Ref Range    PTH 94.5 (H) 12.0 - 88.0 pg/mL   Vitamin D 25 hydroxy   Result Value Ref Range    Vit D, 25-Hydroxy 54.5 30.0 - 100.0 ng/mL       ASSESSMENT and PLAN:  David Tan was seen today for chronic kidney disease. Diagnoses and all orders for this visit:    Stage 4 chronic kidney disease ([address])  -     CBC and differential; Future  -     Basic metabolic panel; Future  -     UA (URINE) with reflex to Scope; Future  -     Albumin / creatinine urine ratio; Future  -     Phosphorus; Future  -     Uric acid; Future  -     PTH, intact; Future  -     Vitamin D 25 hydroxy; Future  -     Ambulatory referral to advanced care planning; Future    Hypertensive kidney disease with stage 4 chronic kidney disease (HCC)  -     Basic metabolic panel; Future  -     UA (URINE) with reflex to Scope; Future  -     Albumin / creatinine urine ratio; Future    Other proteinuria  -     UA (URINE) with reflex to Scope; Future  -     Albumin / creatinine urine ratio; Future    Vitamin D deficiency  -     Vitamin D 25 hydroxy; Future      1. CKD stage 4. Multifactorial and suspected due to underlying diabetic nephropathy on top of hypertensive nephrosclerosis plus advanced age. Upon review of old medical records, patient's new baseline creatinine seems to be fluctuating around 1.9-2.1 and in the interim renal function has worsened to current creatinine of 2.27 with EGFR of 25.   Recently patient was started on Lasix 40 mg p.o. daily and recent urine analysis also showed hyaline cast suggesting patient may be getting intravascular volume depleted and advised patient to drink around 2 L of fluid on regular basis. Patient seems to have memory issue and overall case was discussed in length with patient's daughter today including possibility of dialysis in the future if renal function worsen and GFR goes below 15. Looking at overall clinical picture, patient would not be ideal candidate for home modality and would consider incenter hemodialysis. Patient's daughter is also leaning towards of possibility of in center hemodialysis when indicated. Advance care planning discussed in length with patient and plan to refer patient to ACP meeting with [place]. Management of diabetes and hypertension as mentioned below. Continue to avoid NSAIDs as and recheck renal function before next visit. 2. Hypertension in chronic kidney disease. Today's blood pressure was elevated and above the goal and patient was also bradycardic. Advised patient daughter to check blood pressure on regular basis and if heart rate remains below 50, advised to contact cardiologist for further management. According to patient's daughter, blood pressure is much better compared to office visit today. For time being monitor hypertension with metoprolol 12.5 mg PO BID, isosorbide 10 mg PO BID, amlodipine 10 mg PO daily, lisinopril 20 mg PO daily along with Lasix 40 mg p.o. daily. Advised to follow low-salt diet. Patient had underwent renal artery Doppler on March 2020 and it was difficult to visualize renal artery due to bowel gas pattern. Patient was also found to have diffuse disease in left renal artery but patent. 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    3. Diabetes type 2 in chronic kidney disease. Goal Hb A1c would be < 7% for underlying chronic kidney disease. Continue to avoid metformin. Currently patient is taking glimepiride defer further management of diabetes to PCP. 4. Proteinuria.   Multifactorial, suspected due to underlying diabetic nephropathy on top of hypertensive nephrosclerosis. Current urine microalbumin to creatinine ratio is 735 mg. Continue lisinopril 20 mg p.o. daily and recheck proteinuria with the next visit. 5. Vitamin-D deficiency. Currently taking cholecalciferol 1000 Units PO daily. Current vitamin D level is 54 which is at goal and plan to continue cholecalciferol at current dose and recheck vitamin D level with the next visit. Returns to Nephrology office in 3 months with [place] for [employer]. Future labs ordered. Portions of the record may have been created with voice recognition software. Occasional wrong word or "sound a like" substitutions may have occurred due to the inherent limitations of voice recognition software. Read the chart carefully and recognize, using context, where substitutions have occurred. If you have any questions, please contact the dictating provider.

## 2023-11-09 DIAGNOSIS — I25.10 CORONARY ARTERY DISEASE INVOLVING NATIVE CORONARY ARTERY OF NATIVE HEART WITHOUT ANGINA PECTORIS: ICD-10-CM

## 2023-11-09 DIAGNOSIS — I10 ESSENTIAL HYPERTENSION: ICD-10-CM

## 2023-11-09 RX ORDER — LISINOPRIL 20 MG/1
TABLET ORAL
Qty: 90 TABLET | Refills: 3 | Status: SHIPPED | OUTPATIENT
Start: 2023-11-09

## 2023-11-09 RX ORDER — ISOSORBIDE DINITRATE 10 MG/1
TABLET ORAL
Qty: 180 TABLET | Refills: 3 | Status: SHIPPED | OUTPATIENT
Start: 2023-11-09

## 2023-11-11 DIAGNOSIS — N18.32 TYPE 2 DIABETES MELLITUS WITH STAGE 3B CHRONIC KIDNEY DISEASE, WITHOUT LONG-TERM CURRENT USE OF INSULIN (HCC): Chronic | ICD-10-CM

## 2023-11-11 DIAGNOSIS — E11.22 TYPE 2 DIABETES MELLITUS WITH STAGE 3B CHRONIC KIDNEY DISEASE, WITHOUT LONG-TERM CURRENT USE OF INSULIN (HCC): Chronic | ICD-10-CM

## 2023-11-12 RX ORDER — BLOOD-GLUCOSE METER
KIT MISCELLANEOUS DAILY
Qty: 1 KIT | Refills: 0 | Status: SHIPPED | OUTPATIENT
Start: 2023-11-12

## 2023-11-28 ENCOUNTER — TELEPHONE (OUTPATIENT)
Age: 88
End: 2023-11-28

## 2023-11-28 DIAGNOSIS — E11.22 TYPE 2 DIABETES MELLITUS WITH STAGE 3B CHRONIC KIDNEY DISEASE, WITHOUT LONG-TERM CURRENT USE OF INSULIN (HCC): Primary | ICD-10-CM

## 2023-11-28 DIAGNOSIS — N18.32 TYPE 2 DIABETES MELLITUS WITH STAGE 3B CHRONIC KIDNEY DISEASE, WITHOUT LONG-TERM CURRENT USE OF INSULIN (HCC): Primary | ICD-10-CM

## 2023-11-28 RX ORDER — BLOOD SUGAR DIAGNOSTIC
STRIP MISCELLANEOUS
Qty: 100 EACH | Refills: 3 | Status: SHIPPED | OUTPATIENT
Start: 2023-11-28

## 2023-12-11 ENCOUNTER — OFFICE VISIT (OUTPATIENT)
Dept: CARDIOLOGY CLINIC | Facility: CLINIC | Age: 88
End: 2023-12-11
Payer: MEDICARE

## 2023-12-11 VITALS
BODY MASS INDEX: 28.77 KG/M2 | SYSTOLIC BLOOD PRESSURE: 140 MMHG | DIASTOLIC BLOOD PRESSURE: 70 MMHG | RESPIRATION RATE: 16 BRPM | OXYGEN SATURATION: 97 % | WEIGHT: 201 LBS | HEART RATE: 46 BPM | HEIGHT: 70 IN

## 2023-12-11 DIAGNOSIS — E78.2 MIXED HYPERLIPIDEMIA: ICD-10-CM

## 2023-12-11 DIAGNOSIS — I48.0 PAROXYSMAL ATRIAL FIBRILLATION (HCC): ICD-10-CM

## 2023-12-11 DIAGNOSIS — Z79.01 CHRONIC ANTICOAGULATION: ICD-10-CM

## 2023-12-11 DIAGNOSIS — I25.10 CORONARY ARTERY DISEASE INVOLVING NATIVE CORONARY ARTERY OF NATIVE HEART WITHOUT ANGINA PECTORIS: Primary | ICD-10-CM

## 2023-12-11 DIAGNOSIS — I35.0 NONRHEUMATIC AORTIC VALVE STENOSIS: ICD-10-CM

## 2023-12-11 PROCEDURE — 99214 OFFICE O/P EST MOD 30 MIN: CPT | Performed by: INTERNAL MEDICINE

## 2023-12-11 NOTE — PROGRESS NOTES
[facility] [address] [address]  Cardiology Follow Up    Jonathan Hurley  1935  [id number]      1. Coronary artery disease involving native coronary artery of native heart without angina pectoris        2. Paroxysmal atrial fibrillation (HCC)        3. Chronic anticoagulation        4. Nonrheumatic aortic valve stenosis        5. Mixed hyperlipidemia            Chief Complaint   Patient presents with    Follow-up       Interval History: Patient presents for follow-up visit. Patient denies any chest pain. No shortness of breath out of the ordinary. Patient does have an element of dementia and is a poor historian. Patient's family present during the office visit. Patient had an episode of fall. No injuries noted. Patient does have a tendency for bradycardia. Patient does have history of paroxysmal atrial fibrillation on anticoagulation and beta-blockers. Patient also has history of moderate aortic stenosis. No bleeding issues.     Patient Active Problem List   Diagnosis    Hypertensive CKD (chronic kidney disease)    Mixed hyperlipidemia due to type 2 diabetes mellitus     Coronary artery disease involving native coronary artery of native heart without angina pectoris    History of CVA (cerebrovascular accident)    Paroxysmal atrial fibrillation (HCC)    Type 2 diabetes mellitus with stage 3b chronic kidney disease, without long-term current use of insulin ([address])    Fall    Ambulatory dysfunction    Other proteinuria    Vitamin D deficiency    Dementia (HCC)    Gait instability    Hearing loss    Stage 4 chronic kidney disease (HCC)    HTN (hypertension)    CHF (congestive heart failure) ([address])     Past Medical History:   Diagnosis Date    Acute deep vein thrombosis (DVT) of brachial vein of left upper extremity ([address]) 11/24/2017    Acute deep vein thrombosis (DVT) of left peroneal vein (HCC)     Acute ST elevation myocardial infarction ([address]) Aortic valve stenosis     Atrial fibrillation (HCC)     Basilar artery stenosis     Basilar artery stenosis     Basilar artery stenosis 5/4/2020    MRA Head wo contrast (12/13/2019)  IMPRESSION:   Multifocal intracranial atherosclerotic disease with moderate to severe stenosis at the origin of the left M1 segment with additional atherosclerotic disease noted in the distal right M1 and proximal inferior left M2 branches. Moderate to severe stenosis in the proximal and mid basilar artery with nonvisualization of the right intradural vertebral    Benign prostatic hyperplasia with lower urinary tract symptoms     CAD (coronary artery disease)     CAD in native artery 11/24/2017    Underwent cardiac catheterization on 1/30/2020 and had mid and distal LAD stent placed  Cardiac PCI (1/30/2020)  SUMMARY   CORONARY CIRCULATION: Mid LAD: There was a 90 % stenosis at the site of a prior stent. Distal LAD: There was a 90 % stenosis at the site of a prior stent. Left posterior descending artery: There was a diffuse 50 % stenosis. 1ST LESION INTERVENTIONS: A balloon angioplasty wit    Cerebrovascular small vessel disease 11/12/2020    Chronic kidney disease     Closed fracture of multiple ribs of left side with routine healing 9/3/2020    DM2 (diabetes mellitus, type 2) ([facility])     Elevated troponin 7/19/2021    Herpes zoster without complication 3/68/1003    History of CVA (cerebrovascular accident) 2/21/2019    History of DVT of peroneal vein left lower extremity 12/16/2019    History of transfusion     HLD (hyperlipidemia)     HTN (hypertension)     Infected sebaceous cyst of skin 6/8/2021    Lump in chest 6/1/2021    Middle cerebral artery stenosis     Mild to moderate aortic stenosis 10/9/2018    ECHO (7/2020)  AORTIC VALVE: Leaflets exhibited moderately increased thickness and moderate calcification. Transaortic velocity was increased due to valvular stenosis. There was mild to moderate stenosis.  RICHY 1.4cm, mean pressure gradient 11mmHg, peak velocity 2.15m/s There was mild regurgitation. Near syncope 7/19/2021    Other proteinuria 10/8/2019    Proteinuria     Rib fractures (right) 7/31/2020    Symptomatic bradycardia     Transient cerebral ischemia     Vitamin D deficiency      Social History     Socioeconomic History    Marital status:      Spouse name: Not on file    Number of children: Not on file    Years of education: Not on file    Highest education level: Not on file   Occupational History    Not on file   Tobacco Use    Smoking status: Never     Passive exposure: Never    Smokeless tobacco: Never   Vaping Use    Vaping Use: Never used   Substance and Sexual Activity    Alcohol use: Never    Drug use: Never    Sexual activity: Not Currently     Partners: Female     Birth control/protection: None   Other Topics Concern    Not on file   Social History Narrative    Active advance directive (as per [employer])     No advance directive on file (as per [employer])      Social Determinants of Health     Financial Resource Strain: Low Risk  (1/19/2023)    Overall Financial Resource Strain (CARDIA)     Difficulty of Paying Living Expenses: Not hard at all   Food Insecurity: Not on file   Transportation Needs: No Transportation Needs (1/19/2023)    PRAPARE - Transportation     Lack of Transportation (Medical): No     Lack of Transportation (Non-Medical):  No   Physical Activity: Inactive (5/11/2021)    Exercise Vital Sign     Days of Exercise per Week: 0 days     Minutes of Exercise per Session: 0 min   Stress: No Stress Concern Present (9/22/2023)    [facility]     Feeling of Stress : Not at all   Social Connections: Not on file   Intimate Partner Violence: Not on file   Housing Stability: Not on file      Family History   Problem Relation Age of Onset    Emphysema Father     Emphysema Sister      Past Surgical History:   Procedure Laterality Date    CARDIAC CATHETERIZATION      Outcome: successful; last assessed: 02/03/2015    CARDIAC CATHETERIZATION  11/26/2019    CORONARY ANGIOPLASTY WITH STENT PLACEMENT  2008    stent to LAD     HAND SURGERY      thumb    HIP HARDWARE REMOVAL      TOTAL HIP ARTHROPLASTY Right     TOTAL HIP ARTHROPLASTY Bilateral        Current Outpatient Medications:     acetaminophen (TYLENOL) 500 mg tablet, Take 500 mg by mouth every 6 (six) hours as needed for mild pain, Disp: , Rfl:     amLODIPine (NORVASC) 10 mg tablet, TAKE 1 TABLET BY MOUTH  DAILY, Disp: 90 tablet, Rfl: 3    apixaban (ELIQUIS) 2.5 mg, Take 1 tablet (2.5 mg total) by mouth 2 (two) times a day, Disp: 180 tablet, Rfl: 3    aspirin (ECOTRIN LOW STRENGTH) 81 mg EC tablet, Take 1 tablet (81 mg total) by mouth daily, Disp: , Rfl:     Blood Glucose Monitoring Suppl (ONE TOUCH ULTRA MINI) w/Device KIT, Use daily, Disp: 1 kit, Rfl: 0    cholecalciferol (VITAMIN D3) 1,000 units tablet, Take 1,000 Units by mouth daily, Disp: , Rfl:     finasteride (PROSCAR) 5 mg tablet, TAKE 1 TABLET BY MOUTH  DAILY, Disp: 90 tablet, Rfl: 3    furosemide (LASIX) 40 mg tablet, Take 1 tablet (40 mg total) by mouth daily, Disp: 90 tablet, Rfl: 1    gabapentin (NEURONTIN) 100 mg capsule, TAKE 1 CAPSULE BY MOUTH TWICE  DAILY, Disp: 180 capsule, Rfl: 3    glipiZIDE (GLUCOTROL XL) 2.5 mg 24 hr tablet, TAKE 1 TABLET BY MOUTH DAILY, Disp: 90 tablet, Rfl: 3    glucose blood (OneTouch Ultra) test strip, Check blood sugars once daily. Please substitute with appropriate alternative as covered by patient's insurance.  Dx: E11.65, Disp: 100 each, Rfl: 3    isosorbide dinitrate (ISORDIL) 10 mg tablet, TAKE 1 TABLET BY MOUTH TWICE  DAILY, Disp: 180 tablet, Rfl: 3    lisinopril (ZESTRIL) 20 mg tablet, TAKE 1 TABLET BY MOUTH DAILY, Disp: 90 tablet, Rfl: 3    metoprolol tartrate (LOPRESSOR) 25 mg tablet, TAKE ONE-HALF TABLET BY  MOUTH EVERY 12 HOURS, Disp: 90 tablet, Rfl: 3    Omega-3 Fatty Acids (FISH OIL CONCENTRATE PO), Take 1 tablet by mouth daily, Disp: , Rfl:     vitamin B-12 (CYANOCOBALAMIN) 100 MCG tablet, Take 1,000 mcg by mouth daily, Disp: , Rfl:   Allergies   Allergen Reactions    Celecoxib Other (See Comments) and Hives     Per pt does NOT remember type of reaction or severity level    Hydromorphone Other (See Comments) and Hives     Per pt does NOT remember type of reaction or severity level    Pravastatin Hives    Statins Other (See Comments) and Hives     Per pt does NOT remember type of reaction or severity level       Labs:  No visits with results within 2 Month(s) from this visit.    Latest known visit with results is:   Appointment on 09/22/2023   Component Date Value    WBC 09/22/2023 7.78     RBC 09/22/2023 4.26     Hemoglobin 09/22/2023 13.6     Hematocrit 09/22/2023 40.8     MCV 09/22/2023 96     MCH 09/22/2023 31.9     MCHC 09/22/2023 33.3     RDW 09/22/2023 13.7     MPV 09/22/2023 10.6     Platelets 75/93/7510 181     nRBC 09/22/2023 0     Neutrophils Relative 09/22/2023 65     Immat GRANS % 09/22/2023 0     Lymphocytes Relative 09/22/2023 23     Monocytes Relative 09/22/2023 6     Eosinophils Relative 09/22/2023 5     Basophils Relative 09/22/2023 1     Neutrophils Absolute 09/22/2023 5.01     Immature Grans Absolute 09/22/2023 0.02     Lymphocytes Absolute 09/22/2023 1.81     Monocytes Absolute 09/22/2023 0.49     Eosinophils Absolute 09/22/2023 0.39     Basophils Absolute 09/22/2023 0.06     Sodium 09/22/2023 136     Potassium 09/22/2023 4.7     Chloride 09/22/2023 103     CO2 09/22/2023 26     ANION GAP 09/22/2023 7     BUN 09/22/2023 43 (H)     Creatinine 09/22/2023 2.27 (H)     Glucose, Fasting 09/22/2023 130 (H)     Calcium 09/22/2023 9.3     eGFR 09/22/2023 25     Color, UA 09/22/2023 Yellow     Clarity, UA 09/22/2023 Clear     Specific Gravity, UA 09/22/2023 1.021     pH, UA 09/22/2023 6.0     Leukocytes, UA 09/22/2023 Negative     Nitrite, UA 09/22/2023 Negative     Protein, UA 09/22/2023 300 (3+) (A)     Glucose, UA 09/22/2023 Negative     Ketones, UA 09/22/2023 Negative     Urobilinogen, UA 09/22/2023 <2.0     Bilirubin, UA 09/22/2023 Negative     Occult Blood, UA 09/22/2023 Negative     RBC, UA 09/22/2023 None Seen     WBC, UA 09/22/2023 1-2     Epithelial Cells 09/22/2023 Occasional     Bacteria, UA 09/22/2023 Occasional     Hyaline Casts, UA 09/22/2023 0-3 (A)     Granular Casts, UA 09/22/2023 10-25 (A)     Creatinine, Ur 09/22/2023 168.8     Albumin,U,Random 09/22/2023 1,241.3 (H)     Albumin Creat Ratio 09/22/2023 735 (H)     Phosphorus 09/22/2023 3.7     Uric Acid 09/22/2023 8.5     PTH 09/22/2023 94.5 (H)     Vit D, 25-Hydroxy 09/22/2023 54.5      Imaging: No results found. Review of Systems:  Review of Systems  REVIEW OF SYSTEMS:  Constitutional:  Denies fever or chills   Eyes:  Denies change in visual acuity   HENT: Hard of hearing  Respiratory:  shortness of breath   Cardiovascular:  Denies chest pain or edema   GI:  Denies abdominal pain, nausea, vomiting, bloody stools or diarrhea   :  Denies dysuria, frequency, difficulty in micturition and nocturia  Musculoskeletal:  Denies back pain or joint pain   Neurologic:  Denies headache, focal weakness or sensory changes   Endocrine:  Denies polyuria or polydipsia   Lymphatic:  Denies swollen glands   Psychiatric:  Denies depression or anxiety    Physical Exam:    /70 (BP Location: Left arm, Patient Position: Sitting, Cuff Size: Standard)   Pulse (!) 46   Resp 16   Ht 5' 10" (1.778 m)   Wt 91.2 kg (201 lb)   SpO2 97%   BMI 28.84 kg/m²     Physical Exam  PHYSICAL EXAM:  General:  Patient is not in acute distress   Head: Normocephalic, Atraumatic. HEENT:  Both pupils normal-size atraumatic, normocephalic, nonicteric  Neck:  JVP not raised. Trachea central. No carotid bruit  Respiratory:  normal breath sounds no crackles.  no rhonchi  Cardiovascular:  Regular rate and rhythm no S3 3 or 6 systolic ejection murmur in the right sternal border  GI:  Abdomen soft nontender. No organomegaly. Lymphatic:  No cervical or inguinal lymphadenopathy  Neurologic:  Patient is awake alert, oriented . Grossly nonfocal  Extremities no edema    Echocardiogram August 2023        Left Ventricle: Left ventricular cavity size is normal. Wall thickness is normal. The left ventricular ejection fraction is 50%. Systolic function is low normal.    The following segments are hypokinetic: basal inferior, basal inferolateral, mid inferior and mid inferolateral.    Right Ventricle: Right ventricular cavity size is normal. Systolic function is normal.    Left Atrium: The atrium is mildly dilated. Right Atrium: The atrium is mildly dilated. Aortic Valve: There is mild to moderate regurgitation. There is moderate stenosis. Mean aortic valve gradient of 26 mmHg    Mitral Valve: There is mild to moderate regurgitation. Tricuspid Valve: There is mild regurgitation. Pulmonary Artery: The estimated pulmonary artery systolic pressure is 67.3 mmHg. The pulmonary artery systolic pressure is mildly increased. Discussion/Summary:    Patient with multiple medical problems who seems to be doing reasonably well from cardiac standpoint. Previous studies reviewed with patient. Medications reviewed and possible side effects discussed. concepts of cardiovascular disease , signs and symptoms of heart disease. Dietary and risk factor modification reinforced. All questions answered. Safety measures reviewed. Patient advised to report any problems prompting medical attention. Given tendency for bradycardia, decrease the metoprolol further to once a day. Patient will be scheduled for 48-hour Holter monitor to assess for any significant bradycardia arrhythmias. Symptoms to watch out from cardiac standpoint which would indicate the need for further cardiac evaluation discussed with patient and family.     Overall conservative management based on advanced age and multiple comorbidities as well as dementia. Follow-up in 6 months or earlier as needed. Patient is agreeable with the plan of care.

## 2023-12-13 ENCOUNTER — HOSPITAL ENCOUNTER (OUTPATIENT)
Dept: NON INVASIVE DIAGNOSTICS | Facility: CLINIC | Age: 88
Discharge: HOME | End: 2023-12-13
Payer: MEDICARE

## 2023-12-13 DIAGNOSIS — I48.0 PAROXYSMAL ATRIAL FIBRILLATION (HCC): ICD-10-CM

## 2023-12-13 DIAGNOSIS — I25.10 CORONARY ARTERY DISEASE INVOLVING NATIVE CORONARY ARTERY OF NATIVE HEART WITHOUT ANGINA PECTORIS: ICD-10-CM

## 2023-12-13 PROCEDURE — 93225 XTRNL ECG REC<48 HRS REC: CPT

## 2023-12-13 PROCEDURE — 93226 XTRNL ECG REC<48 HR SCAN A/R: CPT

## 2023-12-15 ENCOUNTER — TELEPHONE (OUTPATIENT)
Dept: CARDIOLOGY CLINIC | Facility: CLINIC | Age: 88
End: 2023-12-15

## 2023-12-15 DIAGNOSIS — I25.10 CORONARY ARTERY DISEASE INVOLVING NATIVE CORONARY ARTERY OF NATIVE HEART WITHOUT ANGINA PECTORIS: ICD-10-CM

## 2023-12-15 PROCEDURE — 93227 XTRNL ECG REC<48 HR R&I: CPT | Performed by: INTERNAL MEDICINE

## 2023-12-15 NOTE — TELEPHONE ENCOUNTER
Spoke with pt's daughter. She  verbally understood the results of pt's Holter monitor/ dr. Stephanie Guthrie message to stay on reduced doses of metoprolol 25 mg half tablet daily. And continue the rest of the medications and report any symptoms out of the ordinary.

## 2023-12-15 NOTE — TELEPHONE ENCOUNTER
----- Message from Rickie Mcclelland MD sent at 12/15/2023  2:50 PM EST -----  Please call her daughter. Holter monitor shows A-fib with.'s of slow heart rate mostly at night and early morning hours. Patient to stay on reduced doses of metoprolol 25 mg half tablet daily. Continue rest of the medications and report any symptoms out of the ordinary.

## 2023-12-27 ENCOUNTER — TELEPHONE (OUTPATIENT)
Dept: NEPHROLOGY | Facility: CLINIC | Age: 88
End: 2023-12-27

## 2023-12-27 NOTE — TELEPHONE ENCOUNTER
Called and spoke to pt's daughter. She asked to cancel the ACP appt. Pt will be present for his follow up appt and will have labs done.

## 2024-01-01 ENCOUNTER — TELEPHONE (OUTPATIENT)
Dept: CARDIOLOGY CLINIC | Facility: CLINIC | Age: 89
End: 2024-01-01

## 2024-01-02 ENCOUNTER — TELEPHONE (OUTPATIENT)
Dept: NEPHROLOGY | Facility: CLINIC | Age: 89
End: 2024-01-02

## 2024-01-02 ENCOUNTER — APPOINTMENT (OUTPATIENT)
Dept: LAB | Facility: HOSPITAL | Age: 89
End: 2024-01-02
Payer: MEDICARE

## 2024-01-02 ENCOUNTER — TELEPHONE (OUTPATIENT)
Dept: CARDIOLOGY CLINIC | Facility: CLINIC | Age: 89
End: 2024-01-02

## 2024-01-02 DIAGNOSIS — N18.4 HYPERTENSIVE KIDNEY DISEASE WITH STAGE 4 CHRONIC KIDNEY DISEASE (HCC): ICD-10-CM

## 2024-01-02 DIAGNOSIS — I12.9 HYPERTENSIVE KIDNEY DISEASE WITH STAGE 4 CHRONIC KIDNEY DISEASE (HCC): ICD-10-CM

## 2024-01-02 DIAGNOSIS — E55.9 VITAMIN D DEFICIENCY: ICD-10-CM

## 2024-01-02 DIAGNOSIS — R80.8 OTHER PROTEINURIA: ICD-10-CM

## 2024-01-02 DIAGNOSIS — N18.4 STAGE 4 CHRONIC KIDNEY DISEASE (HCC): ICD-10-CM

## 2024-01-02 LAB
25(OH)D3 SERPL-MCNC: 41.8 NG/ML (ref 30–100)
ANION GAP SERPL CALCULATED.3IONS-SCNC: 7 MMOL/L
BACTERIA UR QL AUTO: NORMAL /HPF
BASOPHILS # BLD AUTO: 0.07 THOUSANDS/ÂΜL (ref 0–0.1)
BASOPHILS NFR BLD AUTO: 1 % (ref 0–1)
BILIRUB UR QL STRIP: NEGATIVE
BUN SERPL-MCNC: 40 MG/DL (ref 5–25)
CALCIUM SERPL-MCNC: 9.5 MG/DL (ref 8.4–10.2)
CHLORIDE SERPL-SCNC: 101 MMOL/L (ref 96–108)
CLARITY UR: CLEAR
CO2 SERPL-SCNC: 29 MMOL/L (ref 21–32)
COLOR UR: ABNORMAL
CREAT SERPL-MCNC: 2.03 MG/DL (ref 0.6–1.3)
CREAT UR-MCNC: 67.8 MG/DL
EOSINOPHIL # BLD AUTO: 0.63 THOUSAND/ÂΜL (ref 0–0.61)
EOSINOPHIL NFR BLD AUTO: 8 % (ref 0–6)
ERYTHROCYTE [DISTWIDTH] IN BLOOD BY AUTOMATED COUNT: 13.2 % (ref 11.6–15.1)
GFR SERPL CREATININE-BSD FRML MDRD: 28 ML/MIN/1.73SQ M
GLUCOSE P FAST SERPL-MCNC: 135 MG/DL (ref 65–99)
GLUCOSE UR STRIP-MCNC: NEGATIVE MG/DL
HCT VFR BLD AUTO: 41.5 % (ref 36.5–49.3)
HGB BLD-MCNC: 13.7 G/DL (ref 12–17)
HGB UR QL STRIP.AUTO: NEGATIVE
IMM GRANULOCYTES # BLD AUTO: 0.01 THOUSAND/UL (ref 0–0.2)
IMM GRANULOCYTES NFR BLD AUTO: 0 % (ref 0–2)
KETONES UR STRIP-MCNC: NEGATIVE MG/DL
LEUKOCYTE ESTERASE UR QL STRIP: NEGATIVE
LYMPHOCYTES # BLD AUTO: 2.81 THOUSANDS/ÂΜL (ref 0.6–4.47)
LYMPHOCYTES NFR BLD AUTO: 37 % (ref 14–44)
MCH RBC QN AUTO: 31.2 PG (ref 26.8–34.3)
MCHC RBC AUTO-ENTMCNC: 33 G/DL (ref 31.4–37.4)
MCV RBC AUTO: 95 FL (ref 82–98)
MICROALBUMIN UR-MCNC: 659.2 MG/L
MICROALBUMIN/CREAT 24H UR: 972 MG/G CREATININE (ref 0–30)
MONOCYTES # BLD AUTO: 0.55 THOUSAND/ÂΜL (ref 0.17–1.22)
MONOCYTES NFR BLD AUTO: 7 % (ref 4–12)
NEUTROPHILS # BLD AUTO: 3.57 THOUSANDS/ÂΜL (ref 1.85–7.62)
NEUTS SEG NFR BLD AUTO: 47 % (ref 43–75)
NITRITE UR QL STRIP: NEGATIVE
NON-SQ EPI CELLS URNS QL MICRO: NORMAL /HPF
NRBC BLD AUTO-RTO: 0 /100 WBCS
PH UR STRIP.AUTO: 5.5 [PH]
PHOSPHATE SERPL-MCNC: 3.2 MG/DL (ref 2.3–4.1)
PLATELET # BLD AUTO: 188 THOUSANDS/UL (ref 149–390)
PMV BLD AUTO: 9.4 FL (ref 8.9–12.7)
POTASSIUM SERPL-SCNC: 4.1 MMOL/L (ref 3.5–5.3)
PROT UR STRIP-MCNC: ABNORMAL MG/DL
PTH-INTACT SERPL-MCNC: 70.9 PG/ML (ref 12–88)
RBC # BLD AUTO: 4.39 MILLION/UL (ref 3.88–5.62)
RBC #/AREA URNS AUTO: NORMAL /HPF
SODIUM SERPL-SCNC: 137 MMOL/L (ref 135–147)
SP GR UR STRIP.AUTO: 1.01 (ref 1–1.03)
URATE SERPL-MCNC: 8.7 MG/DL (ref 3.5–8.5)
UROBILINOGEN UR STRIP-ACNC: <2 MG/DL
WBC # BLD AUTO: 7.64 THOUSAND/UL (ref 4.31–10.16)
WBC #/AREA URNS AUTO: NORMAL /HPF

## 2024-01-02 PROCEDURE — 84550 ASSAY OF BLOOD/URIC ACID: CPT

## 2024-01-02 PROCEDURE — 85025 COMPLETE CBC W/AUTO DIFF WBC: CPT

## 2024-01-02 PROCEDURE — 80048 BASIC METABOLIC PNL TOTAL CA: CPT

## 2024-01-02 PROCEDURE — 83970 ASSAY OF PARATHORMONE: CPT

## 2024-01-02 PROCEDURE — 36415 COLL VENOUS BLD VENIPUNCTURE: CPT

## 2024-01-02 PROCEDURE — 84100 ASSAY OF PHOSPHORUS: CPT

## 2024-01-02 PROCEDURE — 82043 UR ALBUMIN QUANTITATIVE: CPT

## 2024-01-02 PROCEDURE — 81001 URINALYSIS AUTO W/SCOPE: CPT

## 2024-01-02 PROCEDURE — 82306 VITAMIN D 25 HYDROXY: CPT

## 2024-01-02 PROCEDURE — 82570 ASSAY OF URINE CREATININE: CPT

## 2024-01-02 NOTE — TELEPHONE ENCOUNTER
I called [employer] @ [phone] customer service and as per customer service patient is active since 01/01/2012 and is paid to 02/01/2024 and is still currently active.

## 2024-01-02 NOTE — TELEPHONE ENCOUNTER
Spoke with patient daughter regarding pt [employer] Application renewal.    Patient daughter states that she will bring in the new application and income to the [facility] office between today and Friday 1/5/24.

## 2024-01-03 ENCOUNTER — TELEPHONE (OUTPATIENT)
Dept: NEPHROLOGY | Facility: CLINIC | Age: 89
End: 2024-01-03

## 2024-01-03 NOTE — TELEPHONE ENCOUNTER
Patient of Angie daughter Chanel called stating that she was canceling the patient appointment for today 1/3/2024 nephrology follow up because the patient is not feeling well. I did offer the patient daughter Chanel to have a video call, but the patient daughter Chanel stated that the patient does not having internet connection in is house. The patient daughter Chanel stated that she will call back to reschedule the patient appointment when he is feeling better.

## 2024-01-10 DIAGNOSIS — I48.0 PAROXYSMAL ATRIAL FIBRILLATION (HCC): ICD-10-CM

## 2024-01-30 ENCOUNTER — OFFICE VISIT (OUTPATIENT)
Age: 89
End: 2024-01-30
Payer: MEDICARE

## 2024-01-30 ENCOUNTER — TELEPHONE (OUTPATIENT)
Age: 89
End: 2024-01-30

## 2024-01-30 ENCOUNTER — APPOINTMENT (OUTPATIENT)
Age: 89
End: 2024-01-30
Payer: MEDICARE

## 2024-01-30 VITALS
HEART RATE: 45 BPM | WEIGHT: 200 LBS | RESPIRATION RATE: 18 BRPM | BODY MASS INDEX: 28.7 KG/M2 | SYSTOLIC BLOOD PRESSURE: 120 MMHG | OXYGEN SATURATION: 98 % | TEMPERATURE: 96 F | DIASTOLIC BLOOD PRESSURE: 60 MMHG

## 2024-01-30 DIAGNOSIS — I48.0 PAROXYSMAL ATRIAL FIBRILLATION (HCC): ICD-10-CM

## 2024-01-30 DIAGNOSIS — E78.2 MIXED HYPERLIPIDEMIA DUE TO TYPE 2 DIABETES MELLITUS: ICD-10-CM

## 2024-01-30 DIAGNOSIS — G30.8 ALZHEIMER'S DEMENTIA OF OTHER ONSET, UNSPECIFIED DEMENTIA SEVERITY, UNSPECIFIED WHETHER BEHAVIORAL, PSYCHOTIC, OR MOOD DISTURBANCE OR ANXIETY (HCC): ICD-10-CM

## 2024-01-30 DIAGNOSIS — I13.0 HYPERTENSIVE HEART AND KIDNEY DISEASE WITH CHRONIC DIASTOLIC CONGESTIVE HEART FAILURE AND STAGE 4 CHRONIC KIDNEY DISEASE (HCC): Primary | ICD-10-CM

## 2024-01-30 DIAGNOSIS — R26.2 AMBULATORY DYSFUNCTION: ICD-10-CM

## 2024-01-30 DIAGNOSIS — N18.4 HYPERTENSIVE HEART AND KIDNEY DISEASE WITH CHRONIC DIASTOLIC CONGESTIVE HEART FAILURE AND STAGE 4 CHRONIC KIDNEY DISEASE (HCC): Primary | ICD-10-CM

## 2024-01-30 DIAGNOSIS — F02.80 ALZHEIMER'S DEMENTIA OF OTHER ONSET, UNSPECIFIED DEMENTIA SEVERITY, UNSPECIFIED WHETHER BEHAVIORAL, PSYCHOTIC, OR MOOD DISTURBANCE OR ANXIETY (HCC): ICD-10-CM

## 2024-01-30 DIAGNOSIS — N18.4 TYPE 2 DIABETES MELLITUS WITH STAGE 4 CHRONIC KIDNEY DISEASE, WITHOUT LONG-TERM CURRENT USE OF INSULIN (HCC): Chronic | ICD-10-CM

## 2024-01-30 DIAGNOSIS — Z00.00 MEDICARE ANNUAL WELLNESS VISIT, SUBSEQUENT: ICD-10-CM

## 2024-01-30 DIAGNOSIS — E78.2 MIXED HYPERLIPIDEMIA DUE TO TYPE 2 DIABETES MELLITUS: Chronic | ICD-10-CM

## 2024-01-30 DIAGNOSIS — E11.22 TYPE 2 DIABETES MELLITUS WITH STAGE 4 CHRONIC KIDNEY DISEASE, WITHOUT LONG-TERM CURRENT USE OF INSULIN (HCC): Chronic | ICD-10-CM

## 2024-01-30 DIAGNOSIS — Z91.81 AT HIGH RISK FOR FALLS: ICD-10-CM

## 2024-01-30 DIAGNOSIS — M47.816 SPONDYLOSIS OF LUMBAR SPINE: ICD-10-CM

## 2024-01-30 DIAGNOSIS — I50.32 HYPERTENSIVE HEART AND KIDNEY DISEASE WITH CHRONIC DIASTOLIC CONGESTIVE HEART FAILURE AND STAGE 4 CHRONIC KIDNEY DISEASE (HCC): Primary | ICD-10-CM

## 2024-01-30 DIAGNOSIS — E11.69 MIXED HYPERLIPIDEMIA DUE TO TYPE 2 DIABETES MELLITUS: Chronic | ICD-10-CM

## 2024-01-30 DIAGNOSIS — E11.69 MIXED HYPERLIPIDEMIA DUE TO TYPE 2 DIABETES MELLITUS: ICD-10-CM

## 2024-01-30 LAB
CHOLEST SERPL-MCNC: 202 MG/DL
EST. AVERAGE GLUCOSE BLD GHB EST-MCNC: 160 MG/DL
HBA1C MFR BLD: 7.2 %
HDLC SERPL-MCNC: 44 MG/DL
LDLC SERPL CALC-MCNC: 131 MG/DL (ref 0–100)
NONHDLC SERPL-MCNC: 158 MG/DL
TRIGL SERPL-MCNC: 135 MG/DL

## 2024-01-30 PROCEDURE — 99214 OFFICE O/P EST MOD 30 MIN: CPT | Performed by: INTERNAL MEDICINE

## 2024-01-30 PROCEDURE — 83036 HEMOGLOBIN GLYCOSYLATED A1C: CPT

## 2024-01-30 PROCEDURE — G0439 PPPS, SUBSEQ VISIT: HCPCS | Performed by: INTERNAL MEDICINE

## 2024-01-30 PROCEDURE — 80061 LIPID PANEL: CPT

## 2024-01-30 PROCEDURE — 36415 COLL VENOUS BLD VENIPUNCTURE: CPT

## 2024-01-30 NOTE — PROGRESS NOTES
Assessment and Plan:     1. 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 BP is stable and at goal. He appears euvolemic in office today. Lungs were clear on exam. Continue monitoring labs. Avoid nephrotoxins. Avoid excess salt intake.    2. Type 2 diabetes mellitus with stage 4 chronic kidney disease, without long-term current use of insulin ([facility])  3. Mixed hyperlipidemia due to type 2 diabetes mellitus     Most recent A1c was 6.6 % on 7/19/2023. Daughter forgot to have him get A1c and lipid panel done that were ordered. Will get this done after today's visit. Will call with results. No change in current medications.    - Hemoglobin A1C; Future  - Lipid panel; Future    4. Alzheimer's dementia ([facility])    Stable and has good support from his daughter. No recent neurologic decline.    5. Paroxysmal atrial fibrillation (HCC)    Stable and follows with cardiology. Continue eliquis.    6. Ambulatory dysfunction  7. At high risk for falls  8. Spondylosis of lumbar spine    Patient is deconditioned and has several other risk factors which put him at high risk for falls. Has back arthritis and the pain is made worse by his immobility. I would recommend home PT/OT evaluation. Patient is homebound. He fatigues easily does mild and simple activities.     9. Medicare annual wellness visit, subsequent      Depression Screening and Follow-up Plan: Patient was screened for depression during today's encounter. They screened negative with a PHQ-2 score of 0.      Preventive health issues were discussed with patient, and age appropriate screening tests were ordered as noted in patient's After Visit Summary.  Personalized health advice and appropriate referrals for  health education or preventive services given if needed, as noted in patient's After Visit Summary.     History of Present Illness:     Patient presents for a Medicare Wellness Visit    History of Present Illness  The patient presents for follow-up and Medicare wellness visit. He is an 88-year-old male with multiple medical comorbidities which include type 2 diabetes, stage 4 chronic kidney disease, hypertension, paroxysmal atrial fibrillation, mixed hyperlipidemia, cerebrovascular accident, coronary artery disease, chronic heart failure with preserved ejection fraction, vitamin D deficiency, proteinuria, dementia, ambulatory dysfunction, and recurrent falls. The patient follows chronically with cardiology and nephrology. No recent ER visits or hospitalizations in the past couple of months. He is accompanied by an adult female.    He is feeling terrible. He can hardly walk. His back is killing him. He can not do anything. His back hurts all the time. He has pain in his kidney area. He has arthritis around the middle. He can not do anything anymore. He denies any recent falls. He denies any changes from his kidney doctor or cardiologist lately. He last did therapy several months ago. He denies any pain in his back that radiates down his legs. Chronically uses walker for ambulation. Denies any recent falls but has a history of recurrent falls in the past. He is at high risk for falls. Hasn't done any PT in a good bit of time. His daughter, Chanel, helps him as much as she can.    Supplemental Information  He has hearing issues.     Patient Care Team:  Beto Garnett DO as PCP - General (Internal Medicine)  Afshan Sandoval MD as Consulting Physician  MD Josh Gonzales MD Brittany Leeanna Rando, CRNP as Nurse Practitioner (Nurse Practitioner)     Review of Systems:     Review of Systems   Constitutional:  Positive for fatigue. Negative for chills and fever.   Respiratory: Negative.      Cardiovascular: Negative.    Gastrointestinal: Negative.    Musculoskeletal:  Positive for arthralgias, back pain and gait problem.   Neurological:  Positive for weakness.        Memory issues   Psychiatric/Behavioral:  Positive for behavioral problems.       Problem List:     Patient Active Problem List   Diagnosis    Hypertensive CKD (chronic kidney disease)    Mixed hyperlipidemia due to type 2 diabetes mellitus     Coronary artery disease involving native coronary artery of native heart without angina pectoris    History of CVA (cerebrovascular accident)    Paroxysmal atrial fibrillation ([facility])    Type 2 diabetes mellitus with stage 4 chronic kidney disease, without long-term current use of insulin ([facility])    Fall    Ambulatory dysfunction    Other proteinuria    Vitamin D deficiency    Dementia ([facility])    Gait instability    Hearing loss    Stage 4 chronic kidney disease ([facility])    Primary hypertension    Chronic heart failure with preserved ejection fraction ([facility])      Past Medical and Surgical History:     Past Medical History:   Diagnosis Date    Acute deep vein thrombosis (DVT) of brachial vein of left upper extremity ([facility]) 11/24/2017    Acute deep vein thrombosis (DVT) of left peroneal vein ([facility])     Acute ST elevation myocardial infarction ([facility])     Aortic valve stenosis     Atrial fibrillation ([facility])     Basilar artery stenosis     Basilar artery stenosis     Basilar artery stenosis 5/4/2020    MRA Head wo contrast (12/13/2019)  IMPRESSION:   Multifocal intracranial atherosclerotic disease with moderate to severe stenosis at the origin of the left M1 segment with additional atherosclerotic disease noted in the distal right M1 and proximal inferior left M2 branches.   Moderate to severe stenosis in the proximal and mid basilar artery with nonvisualization of the right intradural vertebral    Benign prostatic hyperplasia with lower urinary tract symptoms     CAD (coronary artery disease)     CAD in native artery  11/24/2017    Underwent cardiac catheterization on 1/30/2020 and had mid and distal LAD stent placed  Cardiac PCI (1/30/2020)  SUMMARY   CORONARY CIRCULATION: Mid LAD: There was a 90 % stenosis at the site of a prior stent. Distal LAD: There was a 90 % stenosis at the site of a prior stent. Left posterior descending artery: There was a diffuse 50 % stenosis.   1ST LESION INTERVENTIONS: A balloon angioplasty wit    Cerebrovascular small vessel disease 11/12/2020    Chronic kidney disease     Closed fracture of multiple ribs of left side with routine healing 9/3/2020    DM2 (diabetes mellitus, type 2) (HCC)     Elevated troponin 7/19/2021    Herpes zoster without complication 7/28/2021    History of CVA (cerebrovascular accident) 2/21/2019    History of DVT of peroneal vein left lower extremity 12/16/2019    History of transfusion     HLD (hyperlipidemia)     HTN (hypertension)     Infected sebaceous cyst of skin 6/8/2021    Lump in chest 6/1/2021    Middle cerebral artery stenosis     Mild to moderate aortic stenosis 10/9/2018    ECHO (7/2020)  AORTIC VALVE: Leaflets exhibited moderately increased thickness and moderate calcification. Transaortic velocity was increased due to valvular stenosis. There was mild to moderate stenosis. RICHY 1.4cm, mean pressure gradient 11mmHg, peak velocity 2.15m/s There was mild regurgitation.    Near syncope 7/19/2021    Other proteinuria 10/8/2019    Proteinuria     Rib fractures (right) 7/31/2020    Symptomatic bradycardia     Transient cerebral ischemia     Vitamin D deficiency      Past Surgical History:   Procedure Laterality Date    CARDIAC CATHETERIZATION      Outcome: successful; last assessed: 02/03/2015    CARDIAC CATHETERIZATION  11/26/2019    CORONARY ANGIOPLASTY WITH STENT PLACEMENT  2008    stent to LAD     HAND SURGERY      thumb    HIP HARDWARE REMOVAL      TOTAL HIP ARTHROPLASTY Right     TOTAL HIP ARTHROPLASTY Bilateral       Family History:     Family History    Problem Relation Age of Onset    Emphysema Father     Emphysema Sister       Social History:     Social History     Socioeconomic History    Marital status:      Spouse name: None    Number of children: None    Years of education: None    Highest education level: None   Occupational History    None   Tobacco Use    Smoking status: Never     Passive exposure: Never    Smokeless tobacco: Never   Vaping Use    Vaping status: Never Used   Substance and Sexual Activity    Alcohol use: Never    Drug use: Never    Sexual activity: Not Currently     Partners: Female     Birth control/protection: None   Other Topics Concern    None   Social History Narrative    Active advance directive (as per [employer])     No advance directive on file (as per [employer])      Social Determinants of Health     Financial Resource Strain: Low Risk  (1/23/2024)    Overall Financial Resource Strain (CARDIA)     Difficulty of Paying Living Expenses: Not very hard   Food Insecurity: No Food Insecurity (4/14/2023)    Received from [facility]    Hunger Vital Sign     Worried About Running Out of Food in the Last Year: Never true     Ran Out of Food in the Last Year: Never true   Transportation Needs: No Transportation Needs (1/23/2024)    PRAPARE - Transportation     Lack of Transportation (Medical): No     Lack of Transportation (Non-Medical): No   Physical Activity: Inactive (5/11/2021)    Exercise Vital Sign     Days of Exercise per Week: 0 days     Minutes of Exercise per Session: 0 min   Stress: No Stress Concern Present (9/22/2023)    [employer] [place] [employer] - Occupational Stress Questionnaire     Feeling of Stress : Not at all   Social Connections: Feeling Socially Integrated (4/25/2023)    Received from [facility]    OASIS : Social Isolation     How often do you feel lonely or isolated from those around you?: Never   Intimate Partner Violence: Not At Risk (4/14/2023)     Received from [facility]    Humiliation, Afraid, Rape, and Kick questionnaire     Fear of Current or Ex-Partner: No     Emotionally Abused: No     Physically Abused: No     Sexually Abused: No   Housing Stability: Low Risk  (4/14/2023)    Received from [facility]    Housing Stability Vital Sign     Unable to Pay for Housing in the Last Year: No     Number of Places Lived in the Last Year: 1     Unstable Housing in the Last Year: No      Medications and Allergies:     Current Outpatient Medications   Medication Sig Dispense Refill    acetaminophen (TYLENOL) 500 mg tablet Take 500 mg by mouth every 6 (six) hours as needed for mild pain      amLODIPine (NORVASC) 10 mg tablet TAKE 1 TABLET BY MOUTH  DAILY 90 tablet 3    apixaban (ELIQUIS) 2.5 mg Take 1 tablet (2.5 mg total) by mouth 2 (two) times a day 180 tablet 3    aspirin (ECOTRIN LOW STRENGTH) 81 mg EC tablet Take 1 tablet (81 mg total) by mouth daily      Blood Glucose Monitoring Suppl (ONE TOUCH ULTRA MINI) w/Device KIT Use daily 1 kit 0    cholecalciferol (VITAMIN D3) 1,000 units tablet Take 1,000 Units by mouth daily      finasteride (PROSCAR) 5 mg tablet TAKE 1 TABLET BY MOUTH  DAILY 90 tablet 3    furosemide (LASIX) 40 mg tablet Take 1 tablet (40 mg total) by mouth daily 90 tablet 1    gabapentin (NEURONTIN) 100 mg capsule TAKE 1 CAPSULE BY MOUTH TWICE  DAILY 180 capsule 3    glipiZIDE (GLUCOTROL XL) 2.5 mg 24 hr tablet TAKE 1 TABLET BY MOUTH DAILY 90 tablet 3    glucose blood (OneTouch Ultra) test strip Check blood sugars once daily. Please substitute with appropriate alternative as covered by patient's insurance. Dx: E11.65 100 each 3    isosorbide dinitrate (ISORDIL) 10 mg tablet TAKE 1 TABLET BY MOUTH TWICE  DAILY 180 tablet 3    lisinopril (ZESTRIL) 20 mg tablet TAKE 1 TABLET BY MOUTH DAILY 90 tablet 3    metoprolol tartrate (LOPRESSOR) 25 mg tablet 1/2 tab once a day      Omega-3 Fatty Acids (FISH OIL CONCENTRATE PO)  Take 1 tablet by mouth daily      vitamin B-12 (CYANOCOBALAMIN) 100 MCG tablet Take 1,000 mcg by mouth daily       No current facility-administered medications for this visit.     Allergies   Allergen Reactions    Celecoxib Other (See Comments) and Hives     Per pt does NOT remember type of reaction or severity level    Hydromorphone Other (See Comments) and Hives     Per pt does NOT remember type of reaction or severity level    Pravastatin Hives    Statins Other (See Comments) and Hives     Per pt does NOT remember type of reaction or severity level      Immunizations:     Immunization History   Administered Date(s) Administered    COVID-19 MODERNA VACC 0.5 ML IM 02/08/2021, 03/06/2021    COVID-19 PFIZER VACCINE 0.3 ML IM 11/12/2021    INFLUENZA 03/04/2019, 12/18/2019    Influenza, high dose seasonal 0.7 mL 11/12/2020    Influenza, seasonal, injectable 1935    Pneumococcal Conjugate 13-Valent 12/04/2018, 12/18/2019    Pneumococcal Polysaccharide PPV23 1935, 1935    Tdap 1935    Zoster 1935    influenza, trivalent, adjuvanted 12/18/2019      Health Maintenance:         Topic Date Due    Colorectal Cancer Screening  Discontinued         Topic Date Due    Pneumococcal Vaccine: 65+ Years (2 - PPSV23 or PCV20) 02/12/2020    Influenza Vaccine (1) 09/01/2023    COVID-19 Vaccine (4 - 2023-24 season) 09/01/2023      Medicare Screening Tests and Risk Assessments:     Tom is here for his Subsequent Wellness visit. Last Medicare Wellness visit information reviewed, patient interviewed and updates made to the record today.      Health Risk Assessment:   Patient rates overall health as poor. Patient feels that their physical health rating is slightly worse. Patient is satisfied with their life. Eyesight was rated as same. Hearing was rated as slightly worse. Patient feels that their emotional and mental health rating is same. Patients states they are never, rarely angry. Patient states they are  often unusually tired/fatigued. Pain experienced in the last 7 days has been a lot. Patient's pain rating has been 5/10. Patient states that he has experienced no weight loss or gain in last 6 months.     Depression Screening:   PHQ-2 Score: 0      Fall Risk Screening:   In the past year, patient has experienced: history of falling in past year    Number of falls: 2 or more  Injured during fall?: Yes    Feels unsteady when standing or walking?: Yes    Worried about falling?: Yes      Home Safety:  Patient does not have trouble with stairs inside or outside of their home. Patient has working smoke alarms and has working carbon monoxide detector. Home safety hazards include: none.     Nutrition:   Current diet is Diabetic and Unhealthy.     Medications:   Patient is not currently taking any over-the-counter supplements. Patient is not able to manage medications.     Activities of Daily Living (ADLs)/Instrumental Activities of Daily Living (IADLs):   Walk and transfer into and out of bed and chair?: Yes  Dress and groom yourself?: Yes    Bathe or shower yourself?: Yes    Feed yourself? Yes  Do your laundry/housekeeping?: No  Manage your money, pay your bills and track your expenses?: No  Make your own meals?: Yes    Do your own shopping?: Yes    Durable Medical Equipment Suppliers  None    Previous Hospitalizations:   Any hospitalizations or ED visits within the last 12 months?: Yes    How many hospitalizations have you had in the last year?: 1-2    Advance Care Planning:   Living will: Yes    Durable POA for healthcare: Yes    Advanced directive: Yes    Five wishes given: No      Cognitive Screening:   Provider or family/friend/caregiver concerned regarding cognition?: No    PREVENTIVE SCREENINGS      Cardiovascular Screening:    General: Screening Not Indicated and History Lipid Disorder      Diabetes Screening:     General: Screening Not Indicated and History Diabetes      Colorectal Cancer Screening:     General:  Screening Not Indicated      Prostate Cancer Screening:    General: Screening Not Indicated      Osteoporosis Screening:    General: Screening Not Indicated      Abdominal Aortic Aneurysm (AAA) Screening:        General: Screening Not Indicated      Lung Cancer Screening:     General: Screening Not Indicated      Hepatitis C Screening:    General: Screening Not Indicated    Screening, Brief Intervention, and Referral to Treatment (SBIRT)    Screening  Typical number of drinks in a day: 0  Typical number of drinks in a week: 0  Interpretation: Low risk drinking behavior.    AUDIT-C Screenin) How often did you have a drink containing alcohol in the past year? never  2) How many drinks did you have on a typical day when you were drinking in the past year? 0  3) How often did you have 6 or more drinks on one occasion in the past year? never    AUDIT-C Score: 0  Interpretation: Score 0-3 (male): Negative screen for alcohol misuse    Single Item Drug Screening:  How often have you used an illegal drug (including marijuana) or a prescription medication for non-medical reasons in the past year? never    Single Item Drug Screen Score: 0  Interpretation: Negative screen for possible drug use disorder    Brief Intervention  Alcohol & drug use screenings were reviewed. No concerns regarding substance use disorder identified.     Other Counseling Topics:   Car/seat belt/driving safety, skin self-exam, sunscreen and regular weightbearing exercise.      Physical Exam:     /60 (BP Location: Right arm, Patient Position: Sitting, Cuff Size: Standard)   Pulse (!) 45   Temp (!) 96 °F (35.6 °C) (Tympanic)   Resp 18   Wt 90.7 kg (200 lb)   SpO2 98%   BMI 28.70 kg/m²     Physical Exam  Constitutional:       General: He is not in acute distress.     Appearance: He is not ill-appearing.   Cardiovascular:      Rate and Rhythm: Normal rate and regular rhythm.      Heart sounds: No murmur heard.  Pulmonary:      Effort:  Pulmonary effort is normal. No respiratory distress.      Breath sounds: No wheezing.   Abdominal:      General: Bowel sounds are normal. There is no distension.      Tenderness: There is no abdominal tenderness.   Musculoskeletal:         General: Deformity (lumbar paraspinal hypertonicity and spasm bilaterally) present.      Right lower leg: No edema.      Left lower leg: No edema.   Neurological:      Mental Status: He is alert.      Motor: Weakness present.      Gait: Gait abnormal.        Beto Garnett,

## 2024-01-30 NOTE — TELEPHONE ENCOUNTER
----- [place] [facility] sent at 1/30/2024 10:01 AM EST -----  Regarding: Home Health - PT/OT  Please set patient up for home health services. He needs PT and OT services. He is homebound. His daughter, Chanel, is the number we have on file and is the best contact. Can reach out to [facility] to see if they can provide this service to him. Referral is in the chart. My note from today can be faxed.

## 2024-01-30 NOTE — PATIENT INSTRUCTIONS
Medicare Preventive Visit Patient Instructions  Thank you for completing your Welcome to Medicare Visit or Medicare Annual Wellness Visit today. Your next wellness visit will be due in one year (1/30/2025).  The screening/preventive services that you may require over the next 5-10 years are detailed below. Some tests may not apply to you based off risk factors and/or age. Screening tests ordered at today's visit but not completed yet may show as past due. Also, please note that scanned in results may not display below.  Preventive Screenings:  Service Recommendations Previous Testing/Comments   Colorectal Cancer Screening  Colonoscopy    Fecal Occult Blood Test (FOBT)/Fecal Immunochemical Test (FIT)  Fecal DNA/Cologuard Test  Flexible Sigmoidoscopy Age: 45-75 years old   Colonoscopy: every 10 years (May be performed more frequently if at higher risk)  OR  FOBT/FIT: every 1 year  OR  Cologuard: every 3 years  OR  Sigmoidoscopy: every 5 years  Screening may be recommended earlier than age 45 if at higher risk for colorectal cancer. Also, an individualized decision between you and your healthcare provider will decide whether screening between the ages of 76-85 would be appropriate. Colonoscopy: 05/08/2012  FOBT/FIT: Not on file  Cologuard: Not on file  Sigmoidoscopy: Not on file    Screening Not Indicated     Prostate Cancer Screening Individualized decision between patient and health care provider in men between ages of 55-69   Medicare will cover every 12 months beginning on the day after your 50th birthday PSA: 5.8 ng/mL     Screening Not Indicated     Hepatitis C Screening Once for adults born between 1945 and 1965  More frequently in patients at high risk for Hepatitis C Hep C Antibody: Not on file        Diabetes Screening 1-2 times per year if you're at risk for diabetes or have pre-diabetes Fasting glucose: 135 mg/dL (1/2/2024)  A1C: 6.6 % (7/19/2023)  Screening Not Indicated  History Diabetes   Cholesterol  Screening Once every 5 years if you don't have a lipid disorder. May order more often based on risk factors. Lipid panel: 01/10/2023  Screening Not Indicated  History Lipid Disorder      Other Preventive Screenings Covered by Medicare:  Abdominal Aortic Aneurysm (AAA) Screening: covered once if your at risk. You're considered to be at risk if you have a family history of AAA or a male between the age of 65-75 who smoking at least 100 cigarettes in your lifetime.  Lung Cancer Screening: covers low dose CT scan once per year if you meet all of the following conditions: (1) Age 55-77; (2) No signs or symptoms of lung cancer; (3) Current smoker or have quit smoking within the last 15 years; (4) You have a tobacco smoking history of at least 20 pack years (packs per day x number of years you smoked); (5) You get a written order from a healthcare provider.  Glaucoma Screening: covered annually if you're considered high risk: (1) You have diabetes OR (2) Family history of glaucoma OR (3)  aged 50 and older OR (4)  American aged 65 and older  Osteoporosis Screening: covered every 2 years if you meet one of the following conditions: (1) Have a vertebral abnormality; (2) On glucocorticoid therapy for more than 3 months; (3) Have primary hyperparathyroidism; (4) On osteoporosis medications and need to assess response to drug therapy.  HIV Screening: covered annually if you're between the age of 15-65. Also covered annually if you are younger than 15 and older than 65 with risk factors for HIV infection. For pregnant patients, it is covered up to 3 times per pregnancy.    Immunizations:  Immunization Recommendations   Influenza Vaccine Annual influenza vaccination during flu season is recommended for all persons aged >= 6 months who do not have contraindications   Pneumococcal Vaccine   * Pneumococcal conjugate vaccine = PCV13 (Prevnar 13), PCV15 (Vaxneuvance), PCV20 (Prevnar 20)  * Pneumococcal  polysaccharide vaccine = PPSV23 (Pneumovax) Adults 19-63 yo with certain risk factors or if 65+ yo  If never received any pneumonia vaccine: recommend Prevnar 20 (PCV20)  Give PCV20 if previously received 1 dose of PCV13 or PPSV23   Hepatitis B Vaccine 3 dose series if at intermediate or high risk (ex: diabetes, end stage renal disease, liver disease)   Respiratory syncytial virus (RSV) Vaccine - COVERED BY MEDICARE PART D  * RSVPreF3 (Arexvy) CDC recommends that adults 60 years of age and older may receive a single dose of RSV vaccine using shared clinical decision-making (SCDM)   Tetanus (Td) Vaccine - COST NOT COVERED BY MEDICARE PART B Following completion of primary series, a booster dose should be given every 10 years to maintain immunity against tetanus. Td may also be given as tetanus wound prophylaxis.   Tdap Vaccine - COST NOT COVERED BY MEDICARE PART B Recommended at least once for all adults. For pregnant patients, recommended with each pregnancy.   Shingles Vaccine (Shingrix) - COST NOT COVERED BY MEDICARE PART B  2 shot series recommended in those 19 years and older who have or will have weakened immune systems or those 50 years and older     Health Maintenance Due:      Topic Date Due    Colorectal Cancer Screening  Discontinued     Immunizations Due:      Topic Date Due    Pneumococcal Vaccine: 65+ Years (2 - PPSV23 or PCV20) 02/12/2020    Influenza Vaccine (1) 09/01/2023    COVID-19 Vaccine (4 - 2023-24 season) 09/01/2023     Advance Directives   What are advance directives?  Advance directives are legal documents that state your wishes and plans for medical care. These plans are made ahead of time in case you lose your ability to make decisions for yourself. Advance directives can apply to any medical decision, such as the treatments you want, and if you want to donate organs.   What are the types of advance directives?  There are many types of advance directives, and each state has rules about how  to use them. You may choose a combination of any of the following:  Living will:  This is a written record of the treatment you want. You can also choose which treatments you do not want, which to limit, and which to stop at a certain time. This includes surgery, medicine, IV fluid, and tube feedings.   Durable power of  for healthcare (DPAHC):  This is a written record that states who you want to make healthcare choices for you when you are unable to make them for yourself. This person, called a proxy, is usually a family member or a friend. You may choose more than 1 proxy.  Do not resuscitate (DNR) order:  A DNR order is used in case your heart stops beating or you stop breathing. It is a request not to have certain forms of treatment, such as CPR. A DNR order may be included in other types of advance directives.  Medical directive:  This covers the care that you want if you are in a coma, near death, or unable to make decisions for yourself. You can list the treatments you want for each condition. Treatment may include pain medicine, surgery, blood transfusions, dialysis, IV or tube feedings, and a ventilator (breathing machine).  Values history:  This document has questions about your views, beliefs, and how you feel and think about life. This information can help others choose the care that you would choose.  Why are advance directives important?  An advance directive helps you control your care. Although spoken wishes may be used, it is better to have your wishes written down. Spoken wishes can be misunderstood, or not followed. Treatments may be given even if you do not want them. An advance directive may make it easier for your family to make difficult choices about your care.   Fall Prevention    Fall prevention  includes ways to make your home and other areas safer. It also includes ways you can move more carefully to prevent a fall. Health conditions that cause changes in your blood pressure,  vision, or muscle strength and coordination may increase your risk for falls. Medicines may also increase your risk for falls if they make you dizzy, weak, or sleepy.   Fall prevention tips:   Stand or sit up slowly.    Use assistive devices as directed.    Wear shoes that fit well and have soles that .    Wear a personal alarm.    Stay active.    Manage your medical conditions.    Home Safety Tips:  Add items to prevent falls in the bathroom.    Keep paths clear.    Install bright lights in your home.    Keep items you use often on shelves within reach.    Paint or place reflective tape on the edges of your stairs.    Weight Management   Why it is important to manage your weight:  Being overweight increases your risk of health conditions such as heart disease, high blood pressure, type 2 diabetes, and certain types of cancer. It can also increase your risk for osteoarthritis, sleep apnea, and other respiratory problems. Aim for a slow, steady weight loss. Even a small amount of weight loss can lower your risk of health problems.  How to lose weight safely:  A safe and healthy way to lose weight is to eat fewer calories and get regular exercise. You can lose up about 1 pound a week by decreasing the number of calories you eat by 500 calories each day.   Healthy meal plan for weight management:  A healthy meal plan includes a variety of foods, contains fewer calories, and helps you stay healthy. A healthy meal plan includes the following:  Eat whole-grain foods more often.  A healthy meal plan should contain fiber. Fiber is the part of grains, fruits, and vegetables that is not broken down by your body. Whole-grain foods are healthy and provide extra fiber in your diet. Some examples of whole-grain foods are whole-wheat breads and pastas, oatmeal, brown rice, and bulgur.  Eat a variety of vegetables every day.  Include dark, leafy greens such as spinach, kale, sang greens, and mustard greens. Eat yellow and  orange vegetables such as carrots, sweet potatoes, and winter squash.   Eat a variety of fruits every day.  Choose fresh or canned fruit (canned in its own juice or light syrup) instead of juice. Fruit juice has very little or no fiber.  Eat low-fat dairy foods.  Drink fat-free (skim) milk or 1% milk. Eat fat-free yogurt and low-fat cottage cheese. Try low-fat cheeses such as mozzarella and other reduced-fat cheeses.  Choose meat and other protein foods that are low in fat.  Choose beans or other legumes such as split peas or lentils. Choose fish, skinless poultry (chicken or turkey), or lean cuts of red meat (beef or pork). Before you cook meat or poultry, cut off any visible fat.   Use less fat and oil.  Try baking foods instead of frying them. Add less fat, such as margarine, sour cream, regular salad dressing and mayonnaise to foods. Eat fewer high-fat foods. Some examples of high-fat foods include french fries, doughnuts, ice cream, and cakes.  Eat fewer sweets.  Limit foods and drinks that are high in sugar. This includes candy, cookies, regular soda, and sweetened drinks.  Exercise:  Exercise at least 30 minutes per day on most days of the week. Some examples of exercise include walking, biking, dancing, and swimming. You can also fit in more physical activity by taking the stairs instead of the elevator or parking farther away from stores. Ask your healthcare provider about the best exercise plan for you.      © Copyright [employer] 2018 Information is for End User's use only and may not be sold, redistributed or otherwise used for commercial purposes. All illustrations and images included in CareNotes® are the copyrighted property of [employer] or [employer]

## 2024-01-31 ENCOUNTER — TELEPHONE (OUTPATIENT)
Age: 89
End: 2024-01-31

## 2024-01-31 NOTE — TELEPHONE ENCOUNTER
Heart rate today for Tom was: between 43 and 52.   Zohra with PT got it up to: 57.  What parameter does the dr want for his heart rate?    Brenda will be working on his balance and walking  He does get dizzy from his low heart rate

## 2024-01-31 NOTE — TELEPHONE ENCOUNTER
----- [facility] 1/30/2024  9:41 PM EST -----  Stable labs overall. A1c was 7.2%

## 2024-02-08 DIAGNOSIS — N18.32 HYPERTENSIVE HEART AND KIDNEY DISEASE WITH CHRONIC SYSTOLIC CONGESTIVE HEART FAILURE AND STAGE 3B CHRONIC KIDNEY DISEASE (HCC): ICD-10-CM

## 2024-02-08 DIAGNOSIS — I13.0 HYPERTENSIVE HEART AND KIDNEY DISEASE WITH CHRONIC SYSTOLIC CONGESTIVE HEART FAILURE AND STAGE 3B CHRONIC KIDNEY DISEASE (HCC): ICD-10-CM

## 2024-02-08 DIAGNOSIS — I50.22 HYPERTENSIVE HEART AND KIDNEY DISEASE WITH CHRONIC SYSTOLIC CONGESTIVE HEART FAILURE AND STAGE 3B CHRONIC KIDNEY DISEASE (HCC): ICD-10-CM

## 2024-02-09 RX ORDER — FUROSEMIDE 40 MG/1
40 TABLET ORAL DAILY
Qty: 90 TABLET | Refills: 3 | Status: SHIPPED | OUTPATIENT
Start: 2024-02-09

## 2024-03-07 DIAGNOSIS — N18.32 TYPE 2 DIABETES MELLITUS WITH STAGE 3B CHRONIC KIDNEY DISEASE, WITHOUT LONG-TERM CURRENT USE OF INSULIN (HCC): ICD-10-CM

## 2024-03-07 DIAGNOSIS — E11.22 TYPE 2 DIABETES MELLITUS WITH STAGE 3B CHRONIC KIDNEY DISEASE, WITHOUT LONG-TERM CURRENT USE OF INSULIN (HCC): ICD-10-CM

## 2024-03-07 RX ORDER — BLOOD SUGAR DIAGNOSTIC
STRIP MISCELLANEOUS
Qty: 100 EACH | Refills: 3 | Status: SHIPPED | OUTPATIENT
Start: 2024-03-07

## 2024-06-11 ENCOUNTER — OFFICE VISIT (OUTPATIENT)
Age: 89
End: 2024-06-11
Payer: MEDICARE

## 2024-06-11 ENCOUNTER — APPOINTMENT (OUTPATIENT)
Age: 89
End: 2024-06-11
Payer: MEDICARE

## 2024-06-11 VITALS
TEMPERATURE: 97.1 F | WEIGHT: 201.4 LBS | OXYGEN SATURATION: 96 % | HEART RATE: 71 BPM | HEIGHT: 70 IN | BODY MASS INDEX: 28.83 KG/M2 | SYSTOLIC BLOOD PRESSURE: 112 MMHG | RESPIRATION RATE: 17 BRPM | DIASTOLIC BLOOD PRESSURE: 62 MMHG

## 2024-06-11 DIAGNOSIS — E78.2 MIXED HYPERLIPIDEMIA DUE TO TYPE 2 DIABETES MELLITUS  (HCC): ICD-10-CM

## 2024-06-11 DIAGNOSIS — I48.0 PAROXYSMAL ATRIAL FIBRILLATION (HCC): Chronic | ICD-10-CM

## 2024-06-11 DIAGNOSIS — E78.2 MIXED HYPERLIPIDEMIA DUE TO TYPE 2 DIABETES MELLITUS  (HCC): Chronic | ICD-10-CM

## 2024-06-11 DIAGNOSIS — I50.32 HYPERTENSIVE HEART AND KIDNEY DISEASE WITH CHRONIC DIASTOLIC CONGESTIVE HEART FAILURE AND STAGE 4 CHRONIC KIDNEY DISEASE (HCC): ICD-10-CM

## 2024-06-11 DIAGNOSIS — N18.4 TYPE 2 DIABETES MELLITUS WITH STAGE 4 CHRONIC KIDNEY DISEASE, WITHOUT LONG-TERM CURRENT USE OF INSULIN (HCC): Primary | Chronic | ICD-10-CM

## 2024-06-11 DIAGNOSIS — I13.0 HYPERTENSIVE HEART AND KIDNEY DISEASE WITH CHRONIC DIASTOLIC CONGESTIVE HEART FAILURE AND STAGE 4 CHRONIC KIDNEY DISEASE (HCC): ICD-10-CM

## 2024-06-11 DIAGNOSIS — E11.22 TYPE 2 DIABETES MELLITUS WITH STAGE 4 CHRONIC KIDNEY DISEASE, WITHOUT LONG-TERM CURRENT USE OF INSULIN (HCC): Primary | Chronic | ICD-10-CM

## 2024-06-11 DIAGNOSIS — E11.69 MIXED HYPERLIPIDEMIA DUE TO TYPE 2 DIABETES MELLITUS  (HCC): ICD-10-CM

## 2024-06-11 DIAGNOSIS — N18.4 HYPERTENSIVE HEART AND KIDNEY DISEASE WITH CHRONIC DIASTOLIC CONGESTIVE HEART FAILURE AND STAGE 4 CHRONIC KIDNEY DISEASE (HCC): ICD-10-CM

## 2024-06-11 DIAGNOSIS — E11.69 MIXED HYPERLIPIDEMIA DUE TO TYPE 2 DIABETES MELLITUS  (HCC): Chronic | ICD-10-CM

## 2024-06-11 LAB
CHOLEST SERPL-MCNC: 199 MG/DL
EST. AVERAGE GLUCOSE BLD GHB EST-MCNC: 157 MG/DL
HBA1C MFR BLD: 7.1 %
HDLC SERPL-MCNC: 46 MG/DL
LDLC SERPL CALC-MCNC: 125 MG/DL (ref 0–100)
NONHDLC SERPL-MCNC: 153 MG/DL
TRIGL SERPL-MCNC: 138 MG/DL

## 2024-06-11 PROCEDURE — 99214 OFFICE O/P EST MOD 30 MIN: CPT | Performed by: INTERNAL MEDICINE

## 2024-06-11 PROCEDURE — 83036 HEMOGLOBIN GLYCOSYLATED A1C: CPT

## 2024-06-11 PROCEDURE — 80061 LIPID PANEL: CPT

## 2024-06-11 PROCEDURE — G2211 COMPLEX E/M VISIT ADD ON: HCPCS | Performed by: INTERNAL MEDICINE

## 2024-06-11 PROCEDURE — 36415 COLL VENOUS BLD VENIPUNCTURE: CPT

## 2024-06-11 NOTE — PATIENT INSTRUCTIONS
Type 2 Diabetes Management for Adults   WHAT YOU NEED TO KNOW:   What do I need to know about type 2 diabetes management?  Type 2 diabetes is a disease that affects how your body uses glucose (sugar). Either your body cannot make enough insulin, or it cannot use the insulin correctly. It is important to keep diabetes controlled to prevent damage to your heart, blood vessels, and other organs. Management will help you feel well and enjoy your daily activities. Your diabetes care team providers can help you make a plan to fit diabetes care into your schedule. Your plan can change over time to fit your needs and your family's needs.       What do I need to know about high blood sugar levels?  High blood sugar levels may not cause any symptoms. You may feel more thirsty or urinate more often than usual. Over time, high blood sugar levels can damage your nerves, blood vessels, tissues, and organs. The following can increase your blood sugar levels:  Large meals or large amounts of carbohydrates at one time    Less physical activity    Stress    Illness    A lower dose of diabetes medicine or insulin, or a late dose    What do I need to know about low blood sugar levels?  Symptoms include feeling shaky, dizzy, irritable, or confused. You can prevent symptoms by keeping your blood sugar levels from going too low.  Treat a low blood sugar level right away:      Drink 4 ounces of juice or have 1 tube of glucose gel.    Check your blood sugar level again 10 to 15 minutes later.    When the level goes back to normal, eat a meal or snack to prevent another decrease.       Keep glucose gel, raisins, or hard candy with you at all times to treat a low blood sugar level.     Your blood sugar level can get too low if you take diabetes medicine or insulin and do not eat enough food.     If you use insulin, check your blood sugar level before you exercise.      If your blood sugar level is below 100 mg/dL, eat 4 crackers or 2 ounces  of raisins, or drink 4 ounces of juice.    Check your level every 30 minutes if you exercise longer than 1 hour.    You may need a snack during or after exercise.    What can I do to manage my blood sugar levels?   Check your blood sugar levels as directed and as needed.  Several items are available to use to check your levels. You may need to check by testing a drop of blood in a glucose monitor. You may instead be given a continuous glucose monitoring (CGM) device. The device is worn at all times. The CGM checks your blood sugar level every 5 minutes. It sends results to an electronic device such as a smart phone. A CGM can be used with or without an insulin pump. You and your diabetes care team providers will decide on the best method for you. The goal for blood sugar levels before meals  is between 80 and 130 mg/dL and 2 hours after eating  is lower than 180 mg/dL.            Make healthy food choices.  Work with a dietitian to create a meal plan that works for you and your schedule. A dietitian can help you learn how to eat the right amount of carbohydrates (sugar and starchy foods) during your meals and snacks. Examples of carbohydrates are breads, cereals, rice, pasta, fruit, low-fat dairy, and sweets. Carbohydrates can raise your blood sugar level if you eat too many at one time.         Eat high-fiber foods as directed.  Fiber helps improve blood sugar levels. Fiber also lowers your risk for heart disease and other problems diabetes can cause. Examples of high-fiber foods include vegetables, whole-grain bread, and beans such as lyle beans. Your dietitian can tell you how much fiber to have each day.         Get regular physical activity.  Physical activity can help you get to your target blood sugar level goal and manage your weight. Get at least 150 minutes of moderate to vigorous aerobic physical activity each week. Resistance training, such as lifting weights, should be done 3 times each week. Do not  miss more than 2 days of physical activity in a row. Do not sit longer than 30 minutes at a time. Your healthcare provider can help you create an activity plan. The plan can include the best activities for you and can help you build your strength and endurance.            Maintain a healthy weight.  Ask your team what a healthy weight is for you. A healthy weight can help you control diabetes and prevent heart disease. Ask your team to help you create a weight-loss plan, if needed. Even a loss of 3% to 7% of your excess body weight can help make a difference in managing diabetes. Your team will help you set a weight-loss goal, such as 10 to 15 pounds, or 5% of your extra weight. Together you and your team can set manageable weight-loss goals.    Take your diabetes medicine or insulin as directed.  You may need diabetes medicine, insulin, or both to help control your blood sugar levels. Your provider will teach you how and when to take your diabetes medicine or insulin. You will also be taught about side effects oral diabetes medicine can cause. Insulin may be injected or given through a pump or pen. You and your providers will decide on the best method for you:    An insulin pump  is an implanted device that gives your insulin 24 hours a day. An insulin pump prevents the need for multiple insulin injections in a day.         An insulin pen  is a device prefilled with the right amount of insulin.         You and your family members will be taught how to draw up and give insulin  if this is the best method for you. Your providers will also teach you how to dispose of needles and syringes.    You will learn how much insulin you need  and when to give it. You will be taught when not to give insulin. You will also be taught what to do if your blood sugar level drops too low. This may happen if you take insulin and do not eat the right amount of carbohydrates.    What else can I do to manage type 2 diabetes?   Wear  medical alert identification.  Wear medical alert jewelry or carry a card that says you have diabetes. Ask your care team provider where to get these items.         Do not smoke.  Nicotine and other chemicals in cigarettes and cigars can cause lung and blood vessel damage. It also makes it more difficult to manage your diabetes. Ask your provider for information if you currently smoke and need help to quit. Do not use e-cigarettes or smokeless tobacco in place of cigarettes or to help you quit. They still contain nicotine.    Check your feet each day for cuts, scratches, calluses, or other wounds.  Look for redness and swelling, and feel for warmth. Wear shoes that fit well. Check your shoes for rocks or other objects that can hurt your feet. Do not walk barefoot or wear shoes without socks. Wear cotton socks to help keep your feet dry.         Ask about vaccines you may need.  You have a higher risk for serious illness if you get the flu, pneumonia, COVID-19, or hepatitis. Ask your provider if you should get vaccines to prevent these or other diseases, and when to get the vaccines.    Talk to your provider if you become stressed about diabetes care.  Sometimes being able to fit diabetes care into your life can cause increased stress. The stress can cause you not to take care of yourself properly. Your provider can help by offering tips about self-care. A mental health provider can listen and offer help with self-care issues. Other types of counseling can help you make nutrition or physical activity changes.    Have your A1c checked as directed.  Your provider may check your A1c every 3 months, or 2 times each year if your diabetes is controlled. An A1c test shows the average amount of sugar in your blood over the past 2 to 3 months. Your provider will tell you what your A1c level should be.    Have screening tests as directed.  Your provider may recommend screening for complications of diabetes and other conditions  that may develop. Some screenings may begin right away and some may happen within the first 5 years of diagnosis:    Examples of diabetes complications  include kidney problems, high cholesterol, high blood pressure, blood vessel problems, eye problems, and sleep apnea.    You may be screened for a low vitamin B level  if you take oral diabetes medicine for a long time.    You may be screened for polycystic ovarian syndrome (PCOS)  if you are of childbearing age.    Have someone call your local emergency number (911 in the [place]) if:   You cannot be woken.    You have signs of diabetic ketoacidosis:     confusion, fatigue    vomiting    rapid heartbeat    fruity smelling breath    extreme thirst    dry mouth and skin    You have any of the following signs of a heart attack:      Squeezing, pressure, or pain in your chest    You may  also have any of the following:     Discomfort or pain in your back, neck, jaw, stomach, or arm    Shortness of breath    Nausea or vomiting    Lightheadedness or a sudden cold sweat    You have any of the following signs of a stroke:      Numbness or drooping on one side of your face     Weakness in an arm or leg    Confusion or difficulty speaking    Dizziness, a severe headache, or vision loss    When should I call my doctor or diabetes care team provider?   You have a sore or wound that will not heal.    You have a change in the amount you urinate.    Your blood sugar levels are higher than your target goals.    You often have lower blood sugar levels than your target goals.    Your skin is red, dry, warm, or swollen.    You have trouble coping with diabetes, or you feel anxious or depressed.    You have trouble following any part of your care plan, such as your meal plan.    You have questions or concerns about your condition or care.    CARE AGREEMENT:   You have the right to help plan your care. Learn about your health condition and how it may be treated. Discuss treatment options  with your healthcare providers to decide what care you want to receive. You always have the right to refuse treatment. The above information is an  only. It is not intended as medical advice for individual conditions or treatments. Talk to your doctor, nurse or pharmacist before following any medical regimen to see if it is safe and effective for you.  © Copyright [employer] 2023 Information is for End User's use only and may not be sold, redistributed or otherwise used for commercial purposes.

## 2024-06-11 NOTE — PROGRESS NOTES
Assessment & Plan  Type 2 diabetes mellitus with stage 4 chronic kidney disease, without long-term current use of insulin (HCC)    Lab Results   Component Value Date    HGBA1C 7.2 (H) 01/30/2024     Current A1c in progress. Patient's daughter will call to set up appointment with nephrologist. Health is overall stable.       Mixed hyperlipidemia due to type 2 diabetes mellitus  (HCC)    Lipid panel in process. Eat a well balanced diet.    Orders:    Lipid Panel with Direct LDL reflex; Future    Hemoglobin A1C; Future    Basic metabolic panel; Future    Paroxysmal atrial fibrillation (HCC)    Stable and continue eliquis and lopressor as prescribed.    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 Follow-up with cardiology and nephrology. Keep BP <130/80. Avoid nephrotoxins.            History of Present Illness     History of Present Illness  The patient presents for evaluation of multiple medical concerns. He is accompanied by his daughter.    The patient reports experiencing a complete blockage in his left ear, a condition that has persisted for an extended period. Despite attempts to alleviate the blockage with a nasal spray, there has been no noticeable improvement. He has not sought consultation with an otolaryngologist.     Review of Systems   Constitutional:  Positive for fatigue. Negative for activity change and appetite change.   HENT:  Positive for hearing loss.    Respiratory:  Negative for apnea, cough, chest tightness, shortness of breath and wheezing.    Cardiovascular:  Negative for chest pain, palpitations and leg swelling.   Gastrointestinal:  Negative for abdominal distention, abdominal pain, blood in stool, constipation,  "diarrhea, nausea and vomiting.   Musculoskeletal:  Positive for gait problem.   Neurological:  Positive for weakness. Negative for dizziness, light-headedness, numbness and headaches.   Psychiatric/Behavioral:  Positive for behavioral problems. Negative for confusion, hallucinations, sleep disturbance and suicidal ideas. The patient is not nervous/anxious.      Objective     /62 (BP Location: Right arm, Patient Position: Sitting, Cuff Size: Standard)   Pulse 71   Temp (!) 97.1 °F (36.2 °C) (Tympanic)   Resp 17   Ht 5' 10\" (1.778 m)   Wt 91.4 kg (201 lb 6.4 oz)   SpO2 96%   BMI 28.90 kg/m²     Physical Exam  Constitutional:       General: He is not in acute distress.     Appearance: He is not ill-appearing.   HENT:      Right Ear: There is no impacted cerumen.      Left Ear: There is no impacted cerumen.   Cardiovascular:      Rate and Rhythm: Normal rate and regular rhythm.      Heart sounds: No murmur heard.  Pulmonary:      Effort: Pulmonary effort is normal. No respiratory distress.      Breath sounds: No wheezing.   Abdominal:      General: Bowel sounds are normal. There is no distension.      Tenderness: There is no abdominal tenderness.   Musculoskeletal:      Right lower leg: No edema.      Left lower leg: No edema.   Neurological:      Mental Status: He is alert. Mental status is at baseline.      Gait: Gait abnormal.       Beto Garnett DO   "

## 2024-06-11 NOTE — ASSESSMENT & PLAN NOTE
Lab Results   Component Value Date    HGBA1C 7.2 (H) 01/30/2024     Current A1c in progress. Patient's daughter will call to set up appointment with nephrologist. Health is overall stable.

## 2024-06-11 NOTE — ASSESSMENT & PLAN NOTE
Stable and continue eliquis and lopressor as prescribed.     hard copy, drawn during this pregnancy

## 2024-06-11 NOTE — ASSESSMENT & PLAN NOTE
Lipid panel in process. Eat a well balanced diet.    Orders:    Lipid Panel with Direct LDL reflex; Future    Hemoglobin A1C; Future    Basic metabolic panel; Future

## 2024-06-12 ENCOUNTER — TELEPHONE (OUTPATIENT)
Age: 89
End: 2024-06-12

## 2024-06-12 DIAGNOSIS — N18.32 TYPE 2 DIABETES MELLITUS WITH STAGE 3B CHRONIC KIDNEY DISEASE, WITHOUT LONG-TERM CURRENT USE OF INSULIN (HCC): ICD-10-CM

## 2024-06-12 DIAGNOSIS — E11.22 TYPE 2 DIABETES MELLITUS WITH STAGE 3B CHRONIC KIDNEY DISEASE, WITHOUT LONG-TERM CURRENT USE OF INSULIN (HCC): ICD-10-CM

## 2024-06-12 RX ORDER — GLIPIZIDE 2.5 MG/1
TABLET, EXTENDED RELEASE ORAL
Qty: 90 TABLET | Refills: 3 | Status: SHIPPED | OUTPATIENT
Start: 2024-06-12

## 2024-06-12 NOTE — TELEPHONE ENCOUNTER
----- [facility] sent at 6/12/2024  8:04 AM EDT -----  Cholesterol is good at 199 and A1c 7.1%

## 2024-07-02 PROBLEM — F02.80 OTHER ALZHEIMER'S DISEASE (HCC): Status: ACTIVE | Noted: 2022-06-30

## 2024-07-02 PROBLEM — I13.0 HYPERTENSIVE HEART AND RENAL DISEASE WITH CONGESTIVE HEART FAILURE (HCC): Chronic | Status: ACTIVE | Noted: 2017-11-24

## 2024-07-02 PROBLEM — I13.0 HYPERTENSIVE HEART AND RENAL DISEASE WITH CONGESTIVE HEART FAILURE (HCC): Status: ACTIVE | Noted: 2024-07-02

## 2024-07-02 PROBLEM — G30.8 OTHER ALZHEIMER'S DISEASE (HCC): Status: ACTIVE | Noted: 2024-07-02

## 2024-07-02 PROBLEM — G30.8 OTHER ALZHEIMER'S DISEASE (HCC): Status: ACTIVE | Noted: 2022-06-30

## 2024-07-02 PROBLEM — F02.80 OTHER ALZHEIMER'S DISEASE (HCC): Status: ACTIVE | Noted: 2024-07-02

## 2024-07-16 DIAGNOSIS — N13.8 BPH WITH OBSTRUCTION/LOWER URINARY TRACT SYMPTOMS: ICD-10-CM

## 2024-07-16 DIAGNOSIS — N40.1 BPH WITH OBSTRUCTION/LOWER URINARY TRACT SYMPTOMS: ICD-10-CM

## 2024-07-16 RX ORDER — FINASTERIDE 5 MG/1
5 TABLET, FILM COATED ORAL DAILY
Qty: 90 TABLET | Refills: 3 | Status: SHIPPED | OUTPATIENT
Start: 2024-07-16

## 2024-07-22 NOTE — PROGRESS NOTES
Progress Note - Hettie [place] [de-identified] y o  male MRN: [id number]    Unit/Bed#: -72 Encounter: [id number]            Subjective:   D/W patient potential disposition challenges     Objective:     ROS  Gen: denies recent wt loss   Psych: denies mood change        Physical Exam:     Gen:        NAD   Neck:   trachea midline  Lungs:  respirations unlabored   Heart:    + S1 and S2   Abdomen:    Soft, non-tender  Psych: mood/affect appropriate  Neurologic: awake, alert, appropriately answering questions     Functional :  Mobility: CG  Tx: sup  ADLs: min        acetaminophen 975 mg Oral Q8H [address]   amLODIPine 10 mg Oral Daily   aspirin 324 mg Oral Daily   ergocalciferol 50,000 Units Oral Weekly   Followed by      Tito Jackman ON 1/17/2018] cholecalciferol 1,000 Units Oral Daily   doxazosin 4 mg Oral Daily   folic acid-pyridoxine-cyanocobalamin 1 tablet Oral Daily   glyBURIDE 5 mg Oral Daily With Breakfast   insulin lispro 1-5 Units Subcutaneous TID AC   insulin lispro 1-5 Units Subcutaneous HS   lisinopril 40 mg Oral Daily   metoprolol tartrate 100 mg Oral Q12H SACHIN   polyethylene glycol 17 g Oral Daily   warfarin 2 mg Oral Once (warfarin)   [START ON 12/14/2017] warfarin 5 mg Oral Daily (warfarin)       bisacodyl    magnesium hydroxide    traMADol      Assessment:  Patient is a 79 yo male s/p revision of Rt MAREN by Dr Garfield Peoples on 11/20/17 p/w stroke like symptoms and was given TPA however developed Rt thigh/gluteal hematoma; course complicated by LUE DVT    Plan:    Rehabilitation: cont PT/OT for ambulatory/ADL dysfxn    Pain: currently on tylenol ATC, intolerant to 5 mg Oxy IR per family (excessive sedation), allergy to dilaudid; trialed tramadol in the past prior to surgery for hip pain without AE     Rt MAREN revison: performed on 11/20/17 by Dr Garfield Peoples, d/w Dr Garfield Peoples and due to hematoma Dr Garfield Peoples recommended delay staple removal from 2 weeks to 3 weeks post-op, contacted Dr Garfield Peoples at 3 wks post-op and agreeable to staple removal; hip precautions & WBAT per ortho and confirmed with Dr Marin RODRIGUEZ DVT: coumadin; noted to have mildly decreased AT III level at 88 (LLN 92) and + Lupus anticoagulant (per lab recs this is to be repeated in 12 wks to confirm); FU with Dr Neli Harris to arrange FU testing and management of AC as OP     Rt thigh/gluteal hematoma: monitor Hg, cleared by surgery to be on coumadin and aspirin 325 mg QD      Likely CVA: per neurology cleared to dc home plavix and instead increase home ASA 81 mg QD to   mg QD (due to non-response to ASA 81 mg QD per API) with AC and once AC stopped at Dr Matheus Sanchez discretion in 6 months neurology recommends dual AP therapy; API of 12/13/17 shows therapeutic response to  mg daily; patient with allergy to statins (myalgia not hives)     CAD s/p stents: per cards cleared to dc home plavix and instead increase home ASA 81 mg QD to  mg QD due to non-response to ASA 81 mg QD per API for stroke prevention (in addition once AC stopped at Dr Matheus Sanchez discretion neurology recommends dual AP therpy); on BB, patient with allergy to statins (myalgia not hives); FU with Dr Danica Haywood     DM: per OP records patient take glyburide 10 mg QAM and 5 mg QPM however given CKD will defer to IM regarding risk/benefits of this agent in patient with stable CKD versus switch to short-acting sulfonylureas; currently on glyburide 5 mg QAM and ISS, per IM     HTN: on home regimen of cardura 4 mg qd (also for BPH), norvasc 10 mg qdaily, lisinopril 40 mg qd, however home toprol XL 25 mg Q12 has been switched to lopressor 100 mg q12     elevated PSA/BPH/incontinence: on home cardura 4 mg qd; was also on oxybutynin 5 mg qd at home which has not yet been restarted; monitoring for incontinence      basal cell carcinoma: FU with Dr Karri Pond who may refer patient for Mohs surgery  PAD (B/L renal artery stenosis, & celiac trunk stenosis): follows with Dr Juli Tatum, currently on AP for CAD and possible CVA, allergy to statins (myalgia not hives)    Vitamin D insufficiency: 20 4 (LLN 30) on 12/6/17, started on ergocalciferol 50,000 units Qweekly x 6 doses starting 12/6/17       CKD: baseline Cr 1 5-1 7, currently 1 24, FU with Dr Tonny Romero  Anemia: ABLA with likely component of AOCD (CKD); Hg currently stable at 8 2     Dispo: TBD     Incidental lab findings: hyperparathyroidism (noted to be mild elevated above ULN of 72 at 78 9 on 1/18/17 likely 2/2 to CKD, on supplemental Vit D, OP FU with Dr Tonny Romero), elevated total/direct bilirubin (sample hemolyzed, OP FU with PCP for repeat testing and/or specialist referral at PCPs discretion), elevated homocysteine level (mildly elevated above ULN of 14 2 at 15 1, started on a folic TQKC/Y7/X63 tablet in acute care; OP FU w/ Dr Yoshi Sanders for B6/B12/Folate testing at Dr Rosalie Reed discretion and OP FU with patient's own cardiologist Dr Ammon Foley)      Incidental findings of EKG of 11/26/17 15:17 per Dr Anton Kovacs interpretation: PVCs, RBBB/wide QRS, borderline WA interval (200) and prolonged QTc; evaluated and cleared from cards standpoint (seen by Dr Anton Kovacs on 11/26 and Dr Tom Em on 11/27) in acute care; OP FU with Dr Ammon Foley      Incidental findings of echo (11/25): very mild AS, aortic root dilation/ascending aorta dilation; OP FU with Dr Ammon Foley     Incidental findings noted on CT A/P (11/30) & CT C/A/P (11/24): L renal lesions (noted to be stable renal cysts that appear to be simple on U/S of 11/28, OP FU with Dr Tonny Romero), hiatal hernia (no c/o of GERD/reflux symptoms, OP FU with PCP), sebaceous cyst anterior to the sternum (OP FU with PCP and/or Dr Pedro Duong)      Other incidental findings: Rt vertebral artery occlusion, OP FU at [address] (follows with Dr Sriram Morales for PAD)        This patient was discussed by the Interdisciplinary Team in weekly case conference today   The care of the patient was extensively discussed with all care providers and an appropriate rehabilitation plan was formulated unique for this patient  Barriers were identified preventing progression of therapy and appropriate interventions were discussed with each discipline  Please see the team note for input from all disciplines regarding barriers, intervention, and discharge planning      [ x ] Total time spent: 45 Mins, and greater than 50% of this time was spent counseling/coordinating care         No

## 2024-09-03 ENCOUNTER — OFFICE VISIT (OUTPATIENT)
Dept: SURGERY | Facility: CLINIC | Age: 89
End: 2024-09-03
Payer: MEDICARE

## 2024-09-03 VITALS
DIASTOLIC BLOOD PRESSURE: 78 MMHG | HEIGHT: 70 IN | SYSTOLIC BLOOD PRESSURE: 122 MMHG | HEART RATE: 60 BPM | RESPIRATION RATE: 18 BRPM | OXYGEN SATURATION: 98 % | BODY MASS INDEX: 28.46 KG/M2 | TEMPERATURE: 97.8 F | WEIGHT: 198.8 LBS

## 2024-09-03 DIAGNOSIS — J34.0 NOSE ULCERATION: Primary | ICD-10-CM

## 2024-09-03 DIAGNOSIS — L72.3 SEBACEOUS CYST: ICD-10-CM

## 2024-09-03 PROCEDURE — 99213 OFFICE O/P EST LOW 20 MIN: CPT | Performed by: STUDENT IN AN ORGANIZED HEALTH CARE EDUCATION/TRAINING PROGRAM

## 2024-09-03 NOTE — PROGRESS NOTES
Ambulatory Visit  Name: Tom Stewart      : 1935      MRN: [id number]  Encounter Provider: Mauricio Rojas DO  Encounter Date: 9/3/2024   Encounter department: [facility] [place]    Assessment & Plan   1. Nose ulceration  Assessment & Plan:  88 year-old male with ulceration on his nose  -Notes an ulceration on the nose that has been present for roughly a year  -Denies any significant changes  -Sebaceous cyst on the patient's chest roughly 2 x 2 cm without signs of infection, no tenderness to palpation; sebaceous cyst under the patient's right eye roughly 1 x 1 cm without signs of infection; ulceration on the right side of the patient's nose  -Plastic surgery note from 3/14/2023 reviewed  -Hemoglobin A1c from 2024 reviewed, noted to be 7.1  -Primary care physician note from 2024 reviewed  -Referral placed to Dermatology for evaluation  Orders:  -     Ambulatory Referral to Dermatology; Future  2. Sebaceous cyst  Assessment & Plan:  88-year-old male with sebaceous cyst of chest and right eye  -Notes a new cyst that started to form on his sternum area above the prior incision and drainage site  -Also notes a cyst to just below his right eye  -No signs of infection at either  -Patient denies any pain or discomfort at either  -Sebaceous cyst on the patient's chest roughly 2 x 2 cm without signs of infection, no tenderness to palpation; sebaceous cyst under the patient's right eye roughly 1 x 1 cm without signs of infection; ulceration on the right side of the patient's nose  -Hemoglobin A1c from 2024 reviewed, noted to be 7.1  -Primary care physician note from 2024 reviewed  -Discussed that the cyst on his chest I could remove if he would want it removed  -Another option would be continuing to monitor the area since it is not causing him any issues at this time  -Plan to continue to monitor the cyst, if it causes him any pain, infection or continues to grow he should  call the office for reevaluation  -For the cyst just below his right eye recommend Dermatology and possibly Plastic surgery evaluation due to its location  -Referral to Dermatology placed  Orders:  -     Ambulatory Referral to Dermatology; Future      History of Present Illness     Tom Stewart is a 88 y.o. male who presents for evaluation of a cyst on his chest and also near his right eye.  He noticed a new cyst develop above the prior incision and drainage site on his chest.  He denies any pain or discomfort.  Denies any drainage.  He also notes a cyst just below his right eye.  Denies any discomfort or drainage.  He does have a longstanding ulceration of his nose and has seen Plastic surgery for this prior.    Review of Systems   Constitutional:  Negative for chills, fatigue and fever.   HENT:  Negative for congestion, hearing loss, rhinorrhea and sore throat.    Eyes:  Negative for pain and discharge.   Respiratory:  Negative for cough, chest tightness and shortness of breath.    Cardiovascular:  Negative for chest pain and palpitations.   Gastrointestinal:  Negative for abdominal pain, constipation, diarrhea, nausea and vomiting.   Endocrine: Negative for cold intolerance and heat intolerance.   Genitourinary:  Negative for difficulty urinating and dysuria.   Musculoskeletal:  Positive for gait problem. Negative for back pain and neck pain.   Skin:  Negative for color change and rash.        Positive sebaceous cyst of chest, right eye; positive ulceration of nose   Allergic/Immunologic: Negative for environmental allergies and food allergies.   Neurological:  Negative for seizures and headaches.     Medical History Reviewed by provider this encounter:  Tobacco  Allergies  Meds  Problems  Med Hx  Surg Hx  Fam Hx       Past Medical History   Past Medical History:   Diagnosis Date    Acute deep vein thrombosis (DVT) of brachial vein of left upper extremity (HCC) 11/24/2017    Acute deep vein thrombosis  (DVT) of left peroneal vein (HCC)     Acute ST elevation myocardial infarction (HCC)     Aortic valve stenosis     Arthritis     Atrial fibrillation (HCC)     Basilar artery stenosis     Basilar artery stenosis     Basilar artery stenosis 05/04/2020    MRA Head wo contrast (12/13/2019)  IMPRESSION:   Multifocal intracranial atherosclerotic disease with moderate to severe stenosis at the origin of the left M1 segment with additional atherosclerotic disease noted in the distal right M1 and proximal inferior left M2 branches.   Moderate to severe stenosis in the proximal and mid basilar artery with nonvisualization of the right intradural vertebral    Benign prostatic hyperplasia with lower urinary tract symptoms     CAD (coronary artery disease)     CAD in native artery 11/24/2017    Underwent cardiac catheterization on 1/30/2020 and had mid and distal LAD stent placed  Cardiac PCI (1/30/2020)  SUMMARY   CORONARY CIRCULATION: Mid LAD: There was a 90 % stenosis at the site of a prior stent. Distal LAD: There was a 90 % stenosis at the site of a prior stent. Left posterior descending artery: There was a diffuse 50 % stenosis.   1ST LESION INTERVENTIONS: A balloon angioplasty wit    Cerebrovascular small vessel disease 11/12/2020    Chronic kidney disease     Closed fracture of multiple ribs of left side with routine healing 09/03/2020    Coronary artery disease     Dementia (HCC)     Diabetes mellitus (HCC)     DM2 (diabetes mellitus, type 2) ([facility])     Elevated troponin 07/19/2021    Herpes zoster without complication 07/28/2021    History of CVA (cerebrovascular accident) 02/21/2019    History of DVT of peroneal vein left lower extremity 12/16/2019    History of transfusion     HLD (hyperlipidemia)     HTN (hypertension)     Infected sebaceous cyst of skin 06/08/2021    Lump in chest 06/01/2021    Middle cerebral artery stenosis     Mild to moderate aortic stenosis 10/09/2018    ECHO (7/2020)  AORTIC VALVE: Leaflets  exhibited moderately increased thickness and moderate calcification. Transaortic velocity was increased due to valvular stenosis. There was mild to moderate stenosis. RICHY 1.4cm, mean pressure gradient 11mmHg, peak velocity 2.15m/s There was mild regurgitation.    Myocardial infarction (HCC)     Near syncope 07/19/2021    Other proteinuria 10/08/2019    Proteinuria     Rib fractures (right) 07/31/2020    Stroke (HCC)     Symptomatic bradycardia     Transient cerebral ischemia     Vitamin D deficiency      Past Surgical History:   Procedure Laterality Date    CARDIAC CATHETERIZATION      Outcome: successful; last assessed: 02/03/2015    CARDIAC CATHETERIZATION  11/26/2019    CORONARY ANGIOPLASTY WITH STENT PLACEMENT  2008    stent to LAD     CORONARY ARTERY BYPASS GRAFT      HAND SURGERY      thumb    HIP HARDWARE REMOVAL      JOINT REPLACEMENT      TOTAL HIP ARTHROPLASTY Right     TOTAL HIP ARTHROPLASTY Bilateral      Family History   Problem Relation Age of Onset    Emphysema Father     Emphysema Sister      Current Outpatient Medications on File Prior to Visit   Medication Sig Dispense Refill    acetaminophen (TYLENOL) 500 mg tablet Take 500 mg by mouth every 6 (six) hours as needed for mild pain      amLODIPine (NORVASC) 10 mg tablet TAKE 1 TABLET BY MOUTH  DAILY 90 tablet 3    apixaban (ELIQUIS) 2.5 mg Take 1 tablet (2.5 mg total) by mouth 2 (two) times a day 180 tablet 3    aspirin (ECOTRIN LOW STRENGTH) 81 mg EC tablet Take 1 tablet (81 mg total) by mouth daily      Blood Glucose Monitoring Suppl (ONE TOUCH ULTRA MINI) w/Device KIT Use daily 1 kit 0    cholecalciferol (VITAMIN D3) 1,000 units tablet Take 1,000 Units by mouth daily      finasteride (PROSCAR) 5 mg tablet TAKE 1 TABLET BY MOUTH DAILY 90 tablet 3    furosemide (LASIX) 40 mg tablet TAKE 1 TABLET BY MOUTH DAILY 90 tablet 3    gabapentin (NEURONTIN) 100 mg capsule TAKE 1 CAPSULE BY MOUTH TWICE  DAILY 180 capsule 3    glipiZIDE (GLUCOTROL XL) 2.5 mg 24  hr tablet TAKE 1 TABLET BY MOUTH DAILY 90 tablet 3    glucose blood (OneTouch Ultra) test strip Check blood sugars once daily. Please substitute with appropriate alternative as covered by patient's insurance. Dx: E11.65 100 each 3    isosorbide dinitrate (ISORDIL) 10 mg tablet TAKE 1 TABLET BY MOUTH TWICE  DAILY 180 tablet 3    lisinopril (ZESTRIL) 20 mg tablet TAKE 1 TABLET BY MOUTH DAILY 90 tablet 3    metoprolol tartrate (LOPRESSOR) 25 mg tablet 1/2 tab once a day      Omega-3 Fatty Acids (FISH OIL CONCENTRATE PO) Take 1 tablet by mouth daily      vitamin B-12 (CYANOCOBALAMIN) 100 MCG tablet Take 1,000 mcg by mouth daily       No current facility-administered medications on file prior to visit.     Allergies   Allergen Reactions    Celecoxib Other (See Comments) and Hives     Per pt does NOT remember type of reaction or severity level    Hydromorphone Other (See Comments) and Hives     Per pt does NOT remember type of reaction or severity level    Pravastatin Hives    Statins Other (See Comments) and Hives     Per pt does NOT remember type of reaction or severity level      Current Outpatient Medications on File Prior to Visit   Medication Sig Dispense Refill    acetaminophen (TYLENOL) 500 mg tablet Take 500 mg by mouth every 6 (six) hours as needed for mild pain      amLODIPine (NORVASC) 10 mg tablet TAKE 1 TABLET BY MOUTH  DAILY 90 tablet 3    apixaban (ELIQUIS) 2.5 mg Take 1 tablet (2.5 mg total) by mouth 2 (two) times a day 180 tablet 3    aspirin (ECOTRIN LOW STRENGTH) 81 mg EC tablet Take 1 tablet (81 mg total) by mouth daily      Blood Glucose Monitoring Suppl (ONE TOUCH ULTRA MINI) w/Device KIT Use daily 1 kit 0    cholecalciferol (VITAMIN D3) 1,000 units tablet Take 1,000 Units by mouth daily      finasteride (PROSCAR) 5 mg tablet TAKE 1 TABLET BY MOUTH DAILY 90 tablet 3    furosemide (LASIX) 40 mg tablet TAKE 1 TABLET BY MOUTH DAILY 90 tablet 3    gabapentin (NEURONTIN) 100 mg capsule TAKE 1 CAPSULE BY  "MOUTH TWICE  DAILY 180 capsule 3    glipiZIDE (GLUCOTROL XL) 2.5 mg 24 hr tablet TAKE 1 TABLET BY MOUTH DAILY 90 tablet 3    glucose blood (OneTouch Ultra) test strip Check blood sugars once daily. Please substitute with appropriate alternative as covered by patient's insurance. Dx: E11.65 100 each 3    isosorbide dinitrate (ISORDIL) 10 mg tablet TAKE 1 TABLET BY MOUTH TWICE  DAILY 180 tablet 3    lisinopril (ZESTRIL) 20 mg tablet TAKE 1 TABLET BY MOUTH DAILY 90 tablet 3    metoprolol tartrate (LOPRESSOR) 25 mg tablet 1/2 tab once a day      Omega-3 Fatty Acids (FISH OIL CONCENTRATE PO) Take 1 tablet by mouth daily      vitamin B-12 (CYANOCOBALAMIN) 100 MCG tablet Take 1,000 mcg by mouth daily       No current facility-administered medications on file prior to visit.      Social History     Tobacco Use    Smoking status: Never     Passive exposure: Never    Smokeless tobacco: Never   Vaping Use    Vaping status: Never Used   Substance and Sexual Activity    Alcohol use: Never    Drug use: Never    Sexual activity: Not Currently     Partners: Female     Birth control/protection: None     Objective     /78 (BP Location: Left arm, Patient Position: Sitting, Cuff Size: Standard)   Pulse 60   Temp 97.8 °F (36.6 °C)   Resp 18   Ht 5' 10\" (1.778 m)   Wt 90.2 kg (198 lb 12.8 oz)   SpO2 98%   BMI 28.52 kg/m²     Physical Exam  Constitutional:       Appearance: Normal appearance.   HENT:      Head: Normocephalic and atraumatic.      Nose: Nose normal.   Eyes:      General: No scleral icterus.     Conjunctiva/sclera: Conjunctivae normal.   Cardiovascular:      Rate and Rhythm: Normal rate.      Heart sounds: Normal heart sounds.   Pulmonary:      Effort: Pulmonary effort is normal.      Breath sounds: Normal breath sounds.   Musculoskeletal:         General: No signs of injury.   Skin:     General: Skin is warm.      Coloration: Skin is not jaundiced.      Comments: Sebaceous cyst on the patient's chest roughly 2 " x 2 cm without signs of infection, no tenderness to palpation; sebaceous cyst under the patient's right eye roughly 1 x 1 cm without signs of infection; ulceration on the right side of the patient's nose   Neurological:      General: No focal deficit present.      Mental Status: He is alert and oriented to person, place, and time.   Psychiatric:         Mood and Affect: Mood normal.         Behavior: Behavior normal.       Administrative Statements

## 2024-09-03 NOTE — ASSESSMENT & PLAN NOTE
88-year-old male with sebaceous cyst of chest and right eye  -Notes a new cyst that started to form on his sternum area above the prior incision and drainage site  -Also notes a cyst to just below his right eye  -No signs of infection at either  -Patient denies any pain or discomfort at either  -Sebaceous cyst on the patient's chest roughly 2 x 2 cm without signs of infection, no tenderness to palpation; sebaceous cyst under the patient's right eye roughly 1 x 1 cm without signs of infection; ulceration on the right side of the patient's nose  -Hemoglobin A1c from 6/11/2024 reviewed, noted to be 7.1  -Primary care physician note from 6/11/2024 reviewed  -Discussed that the cyst on his chest I could remove if he would want it removed  -Another option would be continuing to monitor the area since it is not causing him any issues at this time  -Plan to continue to monitor the cyst, if it causes him any pain, infection or continues to grow he should call the office for reevaluation  -For the cyst just below his right eye recommend Dermatology and possibly Plastic surgery evaluation due to its location  -Referral to Dermatology placed

## 2024-09-03 NOTE — ASSESSMENT & PLAN NOTE
88 year-old male with ulceration on his nose  -Notes an ulceration on the nose that has been present for roughly a year  -Denies any significant changes  -Sebaceous cyst on the patient's chest roughly 2 x 2 cm without signs of infection, no tenderness to palpation; sebaceous cyst under the patient's right eye roughly 1 x 1 cm without signs of infection; ulceration on the right side of the patient's nose  -Plastic surgery note from 3/14/2023 reviewed  -Hemoglobin A1c from 6/11/2024 reviewed, noted to be 7.1  -Primary care physician note from 6/11/2024 reviewed  -Referral placed to Dermatology for evaluation

## 2024-09-16 ENCOUNTER — TELEPHONE (OUTPATIENT)
Age: 89
End: 2024-09-16

## 2024-09-24 ENCOUNTER — OFFICE VISIT (OUTPATIENT)
Age: 89
End: 2024-09-24

## 2024-09-24 VITALS — SYSTOLIC BLOOD PRESSURE: 124 MMHG | HEART RATE: 78 BPM | DIASTOLIC BLOOD PRESSURE: 65 MMHG | TEMPERATURE: 98.2 F

## 2024-09-24 DIAGNOSIS — L82.1 SEBORRHEIC KERATOSIS: ICD-10-CM

## 2024-09-24 DIAGNOSIS — J34.0 NOSE ULCERATION: ICD-10-CM

## 2024-09-24 DIAGNOSIS — C44.311 BASAL CELL CARCINOMA (BCC) OF RIGHT NASAL SIDEWALL: ICD-10-CM

## 2024-09-24 DIAGNOSIS — Z13.89 SCREENING FOR SKIN CONDITION: ICD-10-CM

## 2024-09-24 DIAGNOSIS — D22.9 MULTIPLE NEVI: Primary | ICD-10-CM

## 2024-09-24 DIAGNOSIS — C44.319 BASAL CELL CARCINOMA OF RIGHT CHEEK: ICD-10-CM

## 2024-09-24 DIAGNOSIS — D18.01 CHERRY ANGIOMA: ICD-10-CM

## 2024-09-24 DIAGNOSIS — L72.3 SEBACEOUS CYST: ICD-10-CM

## 2024-09-24 DIAGNOSIS — C44.1122 BASAL CELL CARCINOMA (BCC) OF RIGHT LOWER EYELID: Primary | ICD-10-CM

## 2024-09-24 DIAGNOSIS — D48.9 NEOPLASM OF UNCERTAIN BEHAVIOR: ICD-10-CM

## 2024-09-24 DIAGNOSIS — C44.219 BASAL CELL CARCINOMA (BCC) OF HELIX OF LEFT EAR: ICD-10-CM

## 2024-09-24 PROCEDURE — 88305 TISSUE EXAM BY PATHOLOGIST: CPT | Performed by: PATHOLOGY

## 2024-09-24 NOTE — PROGRESS NOTES
"[facility]atology Clinic Note     Patient Name: Tom Stewart  Encounter Date: September 24, 2024     Have you been cared for by a [facility] Dermatologist in the last 3 years and, if so, which description applies to you?    NO.   I am considered a \"new\" patient and must complete all patient intake questions. I am MALE/not capable of bearing children.    REVIEW OF SYSTEMS:  Have you recently had or currently have any of the following? Recent fever or chills? No  Any non-healing wound? No   PAST MEDICAL HISTORY:  Have you personally ever had or currently have any of the following?  If \"YES,\" then please provide more detail. Skin cancer (such as Melanoma, Basal Cell Carcinoma, Squamous Cell Carcinoma?  No  Tuberculosis, HIV/AIDS, Hepatitis B or C: No  Radiation Treatment No   HISTORY OF IMMUNOSUPPRESSION:   Do you have a history of any of the following:  Systemic Immunosuppression such as Diabetes, Biologic or Immunotherapy, Chemotherapy, Organ Transplantation, Bone Marrow Transplantation or Prednisone?  No     Answering \"YES\" requires the addition of the dotphrase \"IMMUNOSUPPRESSED\" as the first diagnosis of the patient's visit.   FAMILY HISTORY:  Any \"first degree relatives\" (parent, brother, sister, or child) with the following?    Skin Cancer, Pancreatic or Other Cancer? No   PATIENT EXPERIENCE:    Do you want the Dermatologist to perform a COMPLETE skin exam today including a clinical examination under the \"bra and underwear\" areas?  NO  If necessary, do we have your permission to call and leave a detailed message on your Preferred Phone number that includes your specific medical information?  Yes      Allergies   Allergen Reactions    Celecoxib Other (See Comments) and Hives     Per pt does NOT remember type of reaction or severity level    Hydromorphone Other (See Comments) and Hives     Per pt does NOT remember type of reaction or severity level    Pravastatin Hives    Statins Other (See Comments) and " Hives     Per pt does NOT remember type of reaction or severity level      Current Outpatient Medications:     acetaminophen (TYLENOL) 500 mg tablet, Take 500 mg by mouth every 6 (six) hours as needed for mild pain, Disp: , Rfl:     amLODIPine (NORVASC) 10 mg tablet, TAKE 1 TABLET BY MOUTH  DAILY, Disp: 90 tablet, Rfl: 3    apixaban (ELIQUIS) 2.5 mg, Take 1 tablet (2.5 mg total) by mouth 2 (two) times a day, Disp: 180 tablet, Rfl: 3    aspirin (ECOTRIN LOW STRENGTH) 81 mg EC tablet, Take 1 tablet (81 mg total) by mouth daily, Disp: , Rfl:     Blood Glucose Monitoring Suppl (ONE TOUCH ULTRA MINI) w/Device KIT, Use daily, Disp: 1 kit, Rfl: 0    cholecalciferol (VITAMIN D3) 1,000 units tablet, Take 1,000 Units by mouth daily, Disp: , Rfl:     finasteride (PROSCAR) 5 mg tablet, TAKE 1 TABLET BY MOUTH DAILY, Disp: 90 tablet, Rfl: 3    furosemide (LASIX) 40 mg tablet, TAKE 1 TABLET BY MOUTH DAILY, Disp: 90 tablet, Rfl: 3    gabapentin (NEURONTIN) 100 mg capsule, TAKE 1 CAPSULE BY MOUTH TWICE  DAILY, Disp: 180 capsule, Rfl: 3    glipiZIDE (GLUCOTROL XL) 2.5 mg 24 hr tablet, TAKE 1 TABLET BY MOUTH DAILY, Disp: 90 tablet, Rfl: 3    glucose blood (OneTouch Ultra) test strip, Check blood sugars once daily. Please substitute with appropriate alternative as covered by patient's insurance. Dx: E11.65, Disp: 100 each, Rfl: 3    isosorbide dinitrate (ISORDIL) 10 mg tablet, TAKE 1 TABLET BY MOUTH TWICE  DAILY, Disp: 180 tablet, Rfl: 3    lisinopril (ZESTRIL) 20 mg tablet, TAKE 1 TABLET BY MOUTH DAILY, Disp: 90 tablet, Rfl: 3    metoprolol tartrate (LOPRESSOR) 25 mg tablet, 1/2 tab once a day, Disp: , Rfl:     Omega-3 Fatty Acids (FISH OIL CONCENTRATE PO), Take 1 tablet by mouth daily, Disp: , Rfl:     vitamin B-12 (CYANOCOBALAMIN) 100 MCG tablet, Take 1,000 mcg by mouth daily, Disp: , Rfl:           Whom besides the patient is providing clinical information about today's encounter?   NO ADDITIONAL HISTORIAN (patient alone provided  history)    Physical Exam and Assessment/Plan by Diagnosis:    Chief complaint: Patient is a 87 y/o male present for lesion under the Right Eye which started about 3 months ago and recently opened and bled. PROVIDER referred for evaluation. Pt with previous basal cell cancer treated by [facility] 7 years ago without subsequent follow up .    NEOPLASM OF UNCERTAIN BEHAVIOR OF SKIN    Physical Exam:  (Anatomic Location); (Size and Morphological Description); (Differential Diagnosis):  PROCEDURE NOTE:  PUNCH BIOPSY  Performing Physician:   Anatomic Location; Clinical Description with size (cm); Pre-Op Diagnosis:    Specimen A; Right Lower Periorbital; 15mm nodule; DDX.: Undifferentiated Carcinoma   Anesthesia: 2% Xylocaine with epi    Topical anesthesia: None  Indications: To indicate diagnosis and management plan.    Procedure Details   Patient informed of the risks (including bleeding,scaring and infection) and benefits of the procedure explained. Verbal and written informed consent obtained. The area was prepped and draped in the usual fashion. Anesthesia was obtained with 1% lidocaine with epinephrine. The skin was then stretched perpendicular to the skin tension lines and a punch biopsy to an appropriate sampling depth was obtained with a 3 mm punch with a forceps and iris scissors.   Hemostasis was obtained with Gelfoam.  Complications:  None  Specimen has been sent for review by Dermatopathology.  Plan:  1. Instructed to keep the wound dry and covered for 24-48h and clean thereafter.  2. Warning signs of infection were reviewed.    3. Recommended that the patient use acetaminophen as needed for pain  Standard post-procedure care has been explained and has been included in written form within the patient's copy of Informed Consent.  Specimen B; Right Nasal Wall; 15mm sclerotic pearly plaque  DDX.: R/O Basal Cell Carcinoma  Specimen C; Right Cheek; 13mm pigmented pearly macule; DDX.: R/O Basal Cell  "Carcinoma  Specimen D; Left Ear; 6mm pearly macule; DDX.: R/O Basal Cell Carcinoma  Pertinent Positives:  Pertinent Negatives:                  Additional History of Present Condition:  present for 3 months and has open and bled recently    Assessment and Plan:  I have discussed with the patient that a sample of skin via a \"skin biopsy” would be potentially helpful to further make a specific diagnosis under the microscope.  Based on a thorough discussion of this condition and the management approach to it (including a comprehensive discussion of the known risks, side effects and potential benefits of treatment), the patient (family) agrees to implement the following specific plan:    Procedure:  Skin Biopsy.  After a thorough discussion of treatment options and risk/benefits/alternatives (including but not limited to local pain, scarring, dyspigmentation, blistering, possible superinfection, and inability to confirm a diagnosis via histopathology), verbal and written consent were obtained and portion of the rash was biopsied for tissue sample.  See below for consent that was obtained from patient and subsequent Procedure Note.      PROCEDURE TANGENTIAL (SHAVE) BIOPSY NOTE:    Performing Physician:   Anatomic Location; Clinical Description with size (cm); Pre-Op Diagnosis:   Specimen A; Right Lower Periorbital; 15mm nodule; DDX.: Undifferentiated Carcinoma  Specimen B; Right Nasal Wall; 15mm sclerotic pearly plaque  DDX.: R/O Basal Cell Carcinoma  Specimen C; Right Cheek; 13mm pigmented pearly macule; DDX.: R/O Basal Cell Carcinoma  Specimen D; Left Ear; 6mm pearly macule; DDX.: R/O Basal Cell Carcinoma  Post-op diagnosis: Same     Local anesthesia: 2% Xylocaine with epi     Topical anesthesia: None    Hemostasis: Aluminum chloride     After obtaining informed consent  at which time there was a discussion about the purpose of biopsy  and low risks of infection and bleeding.  The area was prepped and draped " in the usual fashion. Anesthesia was obtained with 1% lidocaine with epinephrine. A shave biopsy to an appropriate sampling depth was obtained by Shave (Dermablade or 15 blade) The resulting wound was covered with surgical ointment and bandaged appropriately.     The patient tolerated the procedure well without complications and was without signs of functional compromise.      Specimen has been sent for review by Dermatopathology.    Standard post-procedure care has been explained and has been included in written form within the patient's copy of Informed Consent.    Scribe Attestation      I,:  Yamileth Busby MA am acting as a scribe while in the presence of the attending physician.:       I,:  Josh Mao MD personally performed the services described in this documentation    as scribed in my presence.:

## 2024-09-24 NOTE — PATIENT INSTRUCTIONS
Dr. Mao Shave/Punch Biopsy After Care Instructions      Remove bandage the next day. Keep bandage dry.    Shower or Bathe as usual the next day.    Cleanse the area once daily with saline or hydrogen peroxide.    Apply Vaseline.  WE ADVISE YOU NOT TO USE NEOSPORIN OR ANY TOPICAL ANTIBIOTIC UNLESS INSTRUCTED BY THE DOCTOR.    Cover area with a dressing or Band-Aid if possible.      Continue treatment until completely healed. (Skin appears in pink).    Try to avoid scab formation.      Slight bleeding may occur after the Band-Aid/Dressing is removed or the first few days after the procedure was done.      Don't panic!!  Apply continuous, direct pressure on the dressing over the wound for 15-20 minutes. DO NOT remove dressing.    HINT:  Set a timer for 15-20 minutes to make sure you press on the wound long enough  SOAKING: the dressing before you remove it should decrease chances of bleeding.  If the wound is on the legs or arms, swelling may occur. Elevating the arm or leg above the level of the heart as much as possible will also decrease swelling, promote healing and decrease chances of bleeding.        ANY QUESTION PLEASE CALL OUR OFFICE AT [phone]).  IF AFTER HOURS, THE ANSWERING SERVICE WILL GET A HOLD OF THE DOCTOR.    PLEASE BE ADVISED THAT BIOPSY RESULTS CAN TAKE UP TO 1 TO 2 WEEKS. YOU WILL RECEIVE THE RESULTS IN [place] FIRST, BUT PLEASE WAIT FOR THE DOCTOR OR STAFF TO NOTIFY YOU.      THANK YOU!!    INFORMED CONSENT DISCUSSION AND POST-OPERATIVE INSTRUCTIONS FOR PATIENT    I.  RATIONALE FOR PROCEDURE  I understand that a skin biopsy allows the Dermatologist to test a lesion or rash under the microscope to obtain a diagnosis.  It usually involves numbing the area with numbing medication and removing a small piece of skin; sometimes the area will be closed with sutures. In this specific procedure, sutures are not usually needed.  If any sutures are placed, then they are usually need to be  "removed in 2 weeks or less.    I understand that my Dermatologist recommends that a skin \"shave\" biopsy be performed today.  A local anesthetic, similar to the kind that a dentist uses when filling a cavity, will be injected with a very small needle into the skin area to be sampled.  The injected skin and tissue underneath \"will go to sleep” and become numb so no pain should be felt afterwards.  An instrument shaped like a tiny \"razor blade\" (shave biopsy instrument) will be used to cut a small piece of tissue and skin from the area so that a sample of tissue can be taken and examined more closely under the microscope.  A slight amount of bleeding will occur, but it will be stopped with direct pressure and a pressure bandage and any other appropriate methods.  I understands that a scar will form where the wound was created.  Surgical ointment will be applied to help protect the wound.  Sutures are not usually needed.    II.  RISKS AND POTENTIAL COMPLICATIONS   I understand the risks and potential complications of a skin biopsy include but are not limited to the following:  Bleeding  Infection  Pain  Scar/keloid  Skin discoloration  Incomplete Removal  Recurrence  Nerve Damage/Numbness/Loss of Function  Allergic Reaction to Anesthesia  Biopsies are diagnostic procedures and based on findings additional treatment or evaluation may be required  Loss or destruction of specimen resulting in no additional findings    My Dermatologist has explained to me the nature of the condition, the nature of the procedure, and the benefits to be reasonably expected compared with alternative approaches.  My Dermatologist has discussed the likelihood of major risks or complications of this procedure including the specific risks listed above, such as bleeding, infection, and scarring/keloid.  I understand that a scar is expected after this procedure.  I understand that my physician cannot predict if the scar will form a \"keloid,\" which " "extends beyond the borders of the wound that is created.  A keloid is a thick, painful, and bumpy scar.  A keloid can be difficult to treat, as it does not always respond well to therapy, which includes injecting cortisone directly into the keloid every few weeks.  While this usually reduces the pain and size of the scar, it does not eliminate it.      I understand that photographs may be taken before and after the procedure.  These will be maintained as part of the medical providers confidential records and may not be made available to me.  I further authorize the medical provider to use the photographs for teaching purposes or to illustrate scientific papers, books, or lectures if in his/her judgment, medical research, education, or science may benefit from its use.    I have had an opportunity to fully inquire about the risks and benefits of this procedure and its alternatives.   I have been given ample time and opportunity to ask questions and to seek a second opinion if I wished to do so.  I acknowledge that there have specifically been no guarantees as to the cosmetic results from the procedure.  I am aware that with any procedure there is always the possibility of an unexpected complication.    III. POST-PROCEDURAL CARE (WHAT YOU WILL NEED TO DO \"AFTER THE BIOPSY\" TO OPTIMIZE HEALING)    Keep the area clean and dry.  Try NOT to remove the bandage or get it wet for the first 24 hours.    Gently clean the area and apply surgical ointment (such as Vaseline petrolatum ointment, which is available \"over the counter\" and not a prescription) to the biopsy site for up to 2 weeks straight.  This acts to protect the wound from the outside world.      Sutures are not usually placed in this procedure.  If any sutures were placed, return for suture removal as instructed (generally 1 week for the face, 2 weeks for the body).      Take Acetaminophen (Tylenol) for discomfort, if no contraindications.  Ibuprofen or aspirin " could make bleeding worse.    Call our office immediately for signs of infection: fever, chills, increased redness, warmth, tenderness, discomfort/pain, or pus or foul smell coming from the wound.    WHAT TO DO IF THERE IS ANY BLEEDING?  If a small amount of bleeding is noticed, place a clean cloth over the area and apply firm pressure for ten minutes.  Check the wound after 10 minutes of direct pressure.  If bleeding persists, try one more time for an additional 10 minutes of direct pressure on the area.  If the bleeding becomes heavier or does not stop after the second attempt, or if you have any other questions about this procedure, then please call your [facility]'s Dermatologist by calling [phone] ([phone]).     I hereby acknowledge that I have reviewed and verified the site with my Dermatologist and have requested and authorized my Dermatologist to proceed with the procedure.    ACTINIC KERATOSIS    Actinic keratoses are very common on sites repeatedly exposed to the sun, especially the backs of the hands and the face, most often affecting the ears, nose, cheeks, upper lip, vermilion of the lower lip, temples, forehead and balding scalp. In severely chronically sun-damaged individuals, they may also be found on the upper trunk, upper and lower limbs, and dorsum of feet.    We discussed the theoretical premalignant (“pre-cancerous”) nature and etiology of these growths.  We discussed the prevailing notion that actinic keratoses are a reflection of abnormal skin cell development due to DNA damage by short wavelength UVB.  They are more likely to appear if the immune function is poor, due to aging, recent sun exposure, predisposing disease or certain drugs.    We discussed that the main concern is that actinic keratoses may predispose to squamous cell carcinoma. It is rare for a solitary actinic keratosis to evolve to squamous cell carcinoma (SCC), but the risk of SCC occurring at some stage in a patient with  more than 10 actinic keratoses is thought to be about 10 to 15%. A tender, thickened, ulcerated or enlarging actinic keratosis is suspicious of SCC.    Actinic keratoses may be prevented by strict sun protection. If already present, keratoses may improve with a very high sun protection factor (50+) broad-spectrum sunscreen applied at least daily to affected areas, year-round.  We recommend that UPF-rated clothing and hats and sunglasses be worn whenever possible and that a sunscreen-moisturizer combination product such as Neutrogena Daily Defense be applied at least three times a day.    We performed a thorough discussion of treatment options and specific risk/benefits/alternatives including but not limited to medical “field” treatment with medications such as the following:    Topical “field area” medications such as 5-fluorouracil or Aldara (specifically, the trouble with long-term compliance, blistering and local skin reaction versus the convenience of at-home therapy and that field therapy “gets what is not yet seen”).    Cryotherapy (specifically, local pain, scarring, dyspigmentation, blistering, possible superinfection, and treats “only what we see” versus directed treatment today).    Photodynamic therapy (specifically, local pain, scarring, dyspigmentation, blistering, possible superinfection, need to schedule for a later date, and time spent in the office versus field therapy that “gets what is not yet seen”).      BASAL CELL CARCINOMA    What is basal cell carcinoma?  Basal cell carcinoma (BCC) is a common, locally invasive, keratinocytic, or non-melanoma, skin cancer. It is also known as rodent ulcer and basalioma. Patients with BCC often develop multiple primary tumours over time.    Who gets basal cell carcinoma?  Risk factors for BCC include:  Age and sex: BCCs are particularly prevalent in elderly males. However, they also affect females and younger adults   Previous BCC or other form of skin cancer  (squamous cell carcinoma, melanoma)   Sun damage (photoaging, actinic keratoses)   Repeated prior episodes of sunburn   Fair skin, blue eyes and blond or red hair--note; BCC can also affect darker skin types   Previous cutaneous injury, thermal burn, disease (eg cutaneous lupus, sebaceous naevus)   Inherited syndromes: BCC is a particular problem for families with basal cell naevus syndrome (Gorlin syndrome), Xcvzi-Cnwsé-Wqfmxkio syndrome, Rombo syndrome, Oley syndrome and xeroderma pigmentosum   Other risk factors include ionising radiation, exposure to arsenic, and immune suppression due to disease or medicines    What causes basal cell carcinoma?  The cause of BCC is multifactorial.  Most often, there are DNA mutations in the patched (PTCH) tumour suppressor gene, part of hedgehog signaling pathway   These may be triggered by exposure to ultraviolet radiation   Various spontaneous and inherited gene defects predispose to BCC    What are the clinical features of basal cell carcinoma?  BCC is a locally invasive skin tumour. The main characteristics are:  Slowly growing plaque or nodule   Skin coloured, pink or pigmented   Varies in size from a few millimetres to several centimetres in diameter   Spontaneous bleeding or ulceration  BCC is very rarely a threat to life. A tiny proportion of BCCs grow rapidly, invade deeply, and/or metastasise to local lymph nodes.    Types of basal cell carcinoma  There are several distinct clinical types of BCC, and over 20 histological growth patterns of BCC.  Nodular BCC  Most common type of facial BCC   Shiny or pearly nodule with a smooth surface   May have central depression or ulceration, so its edges appear rolled   Blood vessels cross its surface   Cystic variant is soft, with jelly-like contents   Micronodular, microcystic and infiltrative types are potentially aggressive subtypes   Also known as nodulocystic carcinoma  Superficial BCC  Most common type in younger adults    Most common type on upper trunk and shoulders   Slightly scaly, irregular plaque   Thin, translucent rolled border   Multiple microerosions  Morphoeaform BCC  Usually found in mid-facial sites   Waxy, scar-like plaque with indistinct borders   Wide and deep subclinical extension   May infiltrate cutaneous nerves (perineural spread)   Also known as morpheic, morphoeiform or sclerosing BCC  Basosquamous carcinoma  Mixed basal cell carcinoma (BCC) and squamous cell carcinoma (SCC)   Infiltrative growth pattern   Potentially more aggressive than other forms of BCC   Also known as basisquamous carcinoma and mixed basal-squamous cell carcinoma       Complications of basal cell carcinoma    Recurrent BCC  Recurrence of BCC after initial treatment is not uncommon. Characteristics of recurrent BCC often include:  Incomplete excision or narrow margins at primary excision   Morphoeic, micronodular, and infiltrative subtypes   Location on head and neck    Advanced BCC  Advanced BCCs are large, often neglected tumours.  They may be several centimetres in diameter   They may be deeply infiltrating into tissues below the skin   They are difficult or impossible to treat surgically    Metastatic BCC  Very rare   Primary tumour is often large, neglected or recurrent, located on head and neck, with aggressive subtype   May have had multiple prior treatments   May arise in site exposed to ionising radiation   Can be fatal    How is basal cell carcinoma diagnosed?  BCC is diagnosed clinically by the presence of a slowly enlarging skin lesion with typical appearance. The diagnosis and  by a diagnostic biopsy or following excision.  Some typical superficial BCCs on trunk and limbs are clinically diagnosed and have non-surgical treatment without histology.    What is the treatment for primary basal cell carcinoma?  The treatment for a BCC depends on its type, size and location, the number to be treated, patient factors, and the  preference or expertise of the doctor. Most BCCs are treated surgically. Long-term follow-up is recommended to check for new lesions and recurrence; the latter may be unnecessary if histology has reported wide clear margins.    Excision biopsy  Excision means the lesion is cut out and the skin stitched up.  Most appropriate treatment for nodular, infiltrative and morphoeic BCCs   Should include 3 to 5 mm margin of normal skin around the tumour   Very large lesions may require flap or skin graft to repair the defect   Pathologist will report deep and lateral margins   Further surgery is recommended for lesions that are incompletely excised    Mohs micrographically controlled excision  Mohs micrographically controlled surgery involves examining carefully marked excised tissue under the microscope, layer by layer, to ensure complete excision.  Very high cure rates achieved by trained Mohs surgeons   Used in high-risk areas of the face around eyes, lips and nose   Suitable for ill-defined, morphoeic, infiltrative and recurrent subtypes   Large defects are repaired by flap or skin graft    Superficial skin surgery  Superficial skin surgery comprises shave, curettage, and electrocautery. It is a rapid technique using local anaesthesia and does not require sutures.  Suitable for small, well-defined nodular or superficial BCCs   Lesions are usually located on trunk or limbs   Wound is left open to heal by secondary intention   Moist wound dressings lead to healing within a few weeks   Eventual scar quality variable    Cryotherapy  Cryotherapy is the treatment of a superficial skin lesion by freezing it, usually with liquid nitrogen.  Suitable for small superficial BCCs on covered areas of trunk and limbs   Best avoided for BCCs on head and neck, and distal to knees   Double freeze-thaw technique   Results in a blister that crusts over and heals within several weeks.   Leaves permanent white aurelio    Photodynamic  therapy  Photodynamic therapy (PDT) refers to a technique in which BCC is treated with a photosensitising chemical, and exposed to light several hours later.  Topical photosensitisers include aminolevulinic acid lotion and methyl aminolevulinate cream   Suitable for low-risk small, superficial BCCs   Best avoided if tumour in site at high risk of recurrence   Results in inflammatory reaction, maximal 3-4 days after procedure   Treatment repeated 7 days after initial treatment   Excellent cosmetic results    Imiquimod cream  Imiquimod is an immune response modifier.  Best used for superficial BCCs less than 2 cm diameter   Applied three to five times each week, for 6-16 weeks   Results in a variable inflammatory reaction, maximal at three weeks   Minimal scarring is usual    Fluorouracil cream  5-Fluorouracil cream is a topical cytotoxic agent.  Used to treat small superficial basal cell carcinomas   Requires prolonged course, eg twice daily for 6-12 weeks   Causes inflammatory reaction   Has high recurrence rates    Radiotherapy  Radiotherapy or X-ray treatment can be used to treat primary BCCs or as adjunctive treatment if margins are incomplete.  Mainly used if surgery is not suitable   Best avoided in young patients and in genetic conditions predisposing to skin cancer   Best cosmetic results achieved using multiple fractions   Typically, patient attends once-weekly for several weeks   Causes inflammatory reaction followed by scar   Risk of radiodermatitis, late recurrence, and new tumours    What is the treatment for advanced or metastatic basal cell carcinoma?  Locally advanced primary, recurrent or metastatic BCC requires multidisciplinary consultation. Often a combination of treatments is used.  Surgery   Radiotherapy   Targeted therapy  Targeted therapy refers to the hedgehog signalling pathway inhibitors, vismodegib and sonidegib. These drugs have some important risks and side effects.    How can basal cell  carcinoma be prevented?  The most important way to prevent BCC is to avoid sunburn. This is especially important in childhood and early life. Fair skinned individuals and those with a personal or family history of BCC should protect their skin from sun exposure daily, year-round and lifelong.  Stay indoors or under the shade in the middle of the day   Wear covering clothing   Apply high protection factor SPF50+ broad-spectrum sunscreens generously to exposed skin if outdoors   Avoid indoor tanning (sun beds, solaria)  Oral nicotinamide (vitamin B3) in a dose of 500 mg twice daily may reduce the number and severity of BCCs.    What is the outlook for basal cell carcinoma?  Most BCCs are cured by treatment. Cure is most likely if treatment is undertaken when the lesion is small.  About 50% of people with BCC develop a second one within 3 years of the first. They are also at increased risk of other skin cancers, especially melanoma. Regular self-skin examinations and long-term annual skin checks by an experienced health professional are recommended.        SQUAMOUS CELL CARCINOMA    What is cutaneous squamous cell carcinoma?  Cutaneous squamous cell carcinoma (SCC) is a common type of keratinocyte or non-melanoma skin cancer. It is derived from cells within the epidermis that make keratin -- the horny protein that makes up skin, hair and nails.  Cutaneous SCC is an invasive disease, referring to cancer cells that have grown beyond the epidermis. SCC can sometimes metastasise and may prove fatal.  Intraepidermal carcinoma (cutaneous SCC in situ) and mucosal SCC are considered elsewhere.    Who gets cutaneous squamous cell carcinoma?  Risk factors for cutaneous SCC include:  Age and sex: SCCs are particularly prevalent in elderly males. However, they also affect females and younger adults.   Previous SCC or another form of skin cancer (basal cell carcinoma, melanoma) are a strong predictor for further skin cancers.    Actinic keratoses   Outdoor occupation or recreation   Smoking   Fair skin, blue eyes and blond or red hair   Previous cutaneous injury, thermal burn, disease (eg cutaneous lupus, epidermolysis bullosa, leg ulcer)   Inherited syndromes: SCC is a particular problem for families with xeroderma pigmentosum and albinism   Other risk factors include ionising radiation, exposure to arsenic, and immune suppression due to disease (eg chronic lymphocytic leukaemia) or medicines. Organ transplant recipients have a massively increased risk of developing SCC.    What causes cutaneous squamous cell carcinoma?  More than 90% of cases of SCC are associated with numerous DNA mutations in multiple somatic genes. Mutations in the p53 tumour suppressor gene are caused by exposure to ultraviolet radiation (UV), especially UVB (known as signature 7). Other signature mutations relate to cigarette smoking, ageing and immune suppression (eg, to drugs such as azathioprine). Mutations in signalling pathways affect the epidermal growth factor receptor, JOSE A, Fyn, and [id number] signalling.   Beta-genus human papillomaviruses (wart virus) are thought to play a role in SCC arising in immune-suppressed populations. ?-HPV and HPV subtypes 5, 8, 17, 20, 24, and 38 have also been associated with an increased risk of cutaneous SCC in immunocompetent individuals.     What are the clinical features of cutaneous squamous cell carcinoma?  Cutaneous SCCs present as enlarging scaly or crusted lumps. They usually arise within pre-existing actinic keratosis or intraepidermal carcinoma.  They grow over weeks to months   They may ulcerate   They are often tender or painful   Located on sun-exposed sites, particularly the face, lips, ears, hands, forearms and lower legs   Size varies from a few millimetres to several centimetres in diameter.    Types of cutaneous squamous cell carcinoma  Distinct clinical types of invasive cutaneous SCC include:  Cutaneous horn  -- the horn is due to excessive production of keratin   Keratoacanthoma (KA) -- a rapidly growing keratinising nodule that may resolve without treatment   Carcinoma cuniculatum (‘verrucous carcinoma’), a slow-growing, warty tumour on the sole of the foot.   Multiple eruptive SCC/KA-like lesions arising in syndromes, such as multiple self-healing squamous epitheliomas of Mcnamara-Smith and Grzybowski syndrome  The pathologist may classify a tumour as well differentiated, moderately well differentiated, poorly differentiated or anaplastic cutaneous SCC. There are other variants.    Classification of squamous cell carcinoma by risk  Cutaneous SCC is classified as low-risk or high-risk, depending on the chance of tumour recurrence and metastasis. Characteristics of high-risk SCC include:  High-risk cutaneous squamous cell carcinoma has the following characteristics:  Diameter greater than or equal to 2 cm   Location on the ear, vermilion of the lip, central face, hands, feet, genitalia   Arising in elderly or immune suppressed patient   Histological thickness greater than 2 mm, poorly differentiated histology, or with the invasion of the subcutaneous tissue, nerves and blood vessels  Metastatic SCC is found in regional lymph nodes (80%), lungs, liver, brain, bones and skin.    Staging cutaneous squamous cell carcinoma  In 2011, the American Joint Committee on Cancer (AJCC) published a new staging systemic for cutaneous SCC for the 7th Edition of the AJCC manual. This evaluates the dimensions of the original primary tumour (T) and its metastases to lymph nodes (N).    Tumour staging for cutaneous SCC  TX: Th Primary tumour cannot be assessed  T0: No evidence of a primary tumour  Tis: Carcinoma in situ  T1: Tumour ? 2cm without high-risk features  T2: Tumour ? 2cm; or; Tumour ? 2 cm with high-risk features  T3: Tumour with the invasion of maxilla, mandible, orbit or temporal bone  T4: Tumour with the invasion of axial or  appendicular skeleton or perineural invasion of skull base    Cassidy staging for cutaneous SCC  NX: Regional lymph nodes cannot be assessed  N0: No regional lymph node metastasis  N1: Metastasis in one local lymph node ? 3cm  N2: Metastasis in one local lymph node ? 3cm; or; Metastasis in >1 local lymph node ? 6cm  N3: Metastasis in lymph node ? 6cm    How is squamous cell carcinoma diagnosed?  Diagnosis of cutaneous SCC is based on clinical features. The diagnosis and histological subtype are confirmed pathologically by diagnostic biopsy or following excision. See squamous cell carcinoma - pathology.  Patients with high-risk SCC may also undergo staging investigations to determine whether it has spread to lymph nodes or elsewhere. These may include:  Imaging using ultrasound scan, X-rays, CT scans, MRI scans   Lymph node or other tissue biopsies    What is the treatment for cutaneous squamous cell carcinoma?  Cutaneous SCC is nearly always treated surgically. Most cases are excised with a 3-10 mm margin of normal tissue around a visible tumour. A flap or skin graft may be needed to repair the defect.  Other methods of removal include:  Shave, curettage, and electrocautery for low-risk tumours on trunk and limbs   Aggressive cryotherapy for very small, thin, low-risk tumours   Mohs micrographic surgery for large facial lesions with indistinct margins or recurrent tumours   Radiotherapy for an inoperable tumour, patients unsuitable for surgery, or as adjuvant    What is the treatment for advanced or metastatic squamous cell carcinoma?  Locally advanced primary, recurrent or metastatic SCC requires multidisciplinary consultation. Often a combination of treatments is used.  Surgery   Radiotherapy   Cemiplimab   Experimental targeted therapy using epidermal growth factor receptor inhibitors    How can cutaneous squamous cell carcinoma be prevented?  There is a great deal of evidence to show that very careful sun  protection at any time of life reduces the number of SCCs. This is particularly important in ageing, sun-damaged, fair skin; in patients that are immune suppressed; and in those who already have actinic keratoses or previous SCC.  Stay indoors or under the shade in the middle of the day   Wear covering clothing   Apply high protection factor SPF50+ broad-spectrum sunscreens generously to exposed skin if outdoors   Avoid indoor tanning (sun beds, solaria)    Oral nicotinamide (vitamin B3) in a dose of 500 mg twice daily may reduce the number and severity of SCCs in people at high risk.  Patients with multiple squamous cell carcinomas may be prescribed an oral retinoid (acitretin or isotretinoin). These reduce the number of tumours but have some nuisance side effects.    What is the outlook for cutaneous squamous cell carcinoma?  Most SCCs are cured by treatment. A cure is most likely if treatment is undertaken when the lesion is small. The risk of recurrence or disease-associated death is greater for tumours that are > 20 mm in diameter and/or > 2 mm in thickness at the time of surgical excision.  About 50% of people at high risk of SCC develop a second one within 5 years of the first. They are also at increased risk of other skin cancers, especially melanoma. Regular self-skin examinations and long-term annual skin checks by an experienced health professional are recommended.

## 2024-09-26 DIAGNOSIS — I10 ESSENTIAL HYPERTENSION: Chronic | ICD-10-CM

## 2024-09-26 DIAGNOSIS — G89.4 CHRONIC PAIN SYNDROME: ICD-10-CM

## 2024-09-26 RX ORDER — GABAPENTIN 100 MG/1
CAPSULE ORAL
Qty: 180 CAPSULE | Refills: 3 | Status: SHIPPED | OUTPATIENT
Start: 2024-09-26

## 2024-09-26 RX ORDER — AMLODIPINE BESYLATE 10 MG/1
10 TABLET ORAL DAILY
Qty: 90 TABLET | Refills: 3 | Status: SHIPPED | OUTPATIENT
Start: 2024-09-26

## 2024-09-27 PROCEDURE — 88305 TISSUE EXAM BY PATHOLOGIST: CPT | Performed by: PATHOLOGY

## 2024-10-02 ENCOUNTER — TELEPHONE (OUTPATIENT)
Dept: DERMATOLOGY | Facility: CLINIC | Age: 89
End: 2024-10-02

## 2024-10-02 NOTE — TELEPHONE ENCOUNTER
Spoke to Chanel, POA/daughter. Discussed Mohs procedures for the biopsies done with Dr Mao.  Explained to her that it would be best to involve Oculoplastics for the lesion under the eye due to the location and potential involvement of eye structures. She agrees with the plan, however, states he is unable to be given general anesthesia (risk of stroke). They would like to use only local anesthesia. Will include this information with referral.   She also states they are unsure if they want to treat all the lesions. They would like to do the lesion by the eye first and see how Tom does with the procedure before moving forward with the other lesions.     Note: Chanel will be out of town until Monday 10/14

## 2024-10-22 ENCOUNTER — APPOINTMENT (OUTPATIENT)
Age: 89
DRG: 291 | End: 2024-10-22
Payer: MEDICARE

## 2024-10-22 ENCOUNTER — LAB (OUTPATIENT)
Age: 89
DRG: 291 | End: 2024-10-22
Payer: MEDICARE

## 2024-10-22 ENCOUNTER — OFFICE VISIT (OUTPATIENT)
Age: 89
End: 2024-10-22
Payer: MEDICARE

## 2024-10-22 VITALS
HEIGHT: 70 IN | WEIGHT: 210.8 LBS | DIASTOLIC BLOOD PRESSURE: 74 MMHG | RESPIRATION RATE: 18 BRPM | TEMPERATURE: 97.5 F | HEART RATE: 50 BPM | BODY MASS INDEX: 30.18 KG/M2 | OXYGEN SATURATION: 91 % | SYSTOLIC BLOOD PRESSURE: 124 MMHG

## 2024-10-22 DIAGNOSIS — R06.09 DYSPNEA ON EXERTION: ICD-10-CM

## 2024-10-22 DIAGNOSIS — R06.09 DYSPNEA ON EXERTION: Primary | ICD-10-CM

## 2024-10-22 DIAGNOSIS — I50.32 CHRONIC HEART FAILURE WITH PRESERVED EJECTION FRACTION (HCC): Chronic | ICD-10-CM

## 2024-10-22 LAB — BNP SERPL-MCNC: 2419 PG/ML (ref 0–100)

## 2024-10-22 PROCEDURE — 71046 X-RAY EXAM CHEST 2 VIEWS: CPT

## 2024-10-22 PROCEDURE — 99214 OFFICE O/P EST MOD 30 MIN: CPT | Performed by: STUDENT IN AN ORGANIZED HEALTH CARE EDUCATION/TRAINING PROGRAM

## 2024-10-22 PROCEDURE — 80048 BASIC METABOLIC PNL TOTAL CA: CPT

## 2024-10-22 PROCEDURE — 36415 COLL VENOUS BLD VENIPUNCTURE: CPT

## 2024-10-22 PROCEDURE — G2211 COMPLEX E/M VISIT ADD ON: HCPCS | Performed by: STUDENT IN AN ORGANIZED HEALTH CARE EDUCATION/TRAINING PROGRAM

## 2024-10-22 PROCEDURE — 83880 ASSAY OF NATRIURETIC PEPTIDE: CPT

## 2024-10-22 NOTE — PROGRESS NOTES
Ambulatory Visit  Name: Tom Stewart      : 1935      MRN: [id number]  Encounter Provider: Thang Tripathi MD  Encounter Date: 10/22/2024   Encounter department: [facility]IMARY [facility] [place]    Assessment & Plan  Dyspnea on exertion  Suspect this is related to acute exacerbation of CHF. Patient with leg swelling, rales, and weight gain from presumed dry weight in September. Patient's daughter reports O2 sat 81-90% at home.Today O2 sat 92-95%.  Increase Lasix to 40 mg BID for the next 3 days  Instructed patient's daughter to call on Friday with update to guide further management  Check CXR, BMP, BNP  Instructed daughter that if O2 sat drops below 90 again or symptoms worsen/do not improve, should seek ED evaluation - she endorses that patient will refuse  Offered albuterol inhaler PRN for SOB/Wheezing but patient's daughter reports patient will refuse so declines Rx    Orders:    Basic metabolic panel; Future    B-Type Natriuretic Peptide(BNP); Future    XR chest pa and lateral; Future    Chronic heart failure with preserved ejection fraction (HCC)  Wt Readings from Last 3 Encounters:   10/22/24 95.6 kg (210 lb 12.8 oz)   24 90.2 kg (198 lb 12.8 oz)   24 91.4 kg (201 lb 6.4 oz)     Currently on Lasix 40 mg qd, Lopressor 12.5 qd, lisinopril 20 mg qd. Management for suspected acute episode as above.            History of Present Illness     Tom Stewart is an 89 yo M with PMH of HFpEF, T2DM, CVA, Alzheimer's, CKD, and HLD who presents today for dyspnea on exertion. History limited by dementia. Patient's daughter provides history. Patient has dyspnea on exertion over the last few days with generalized weakness. Legs are more swollen than baseline. Also some wheezing. Denies chest pain, orthopnea, or palpitations. Family member took patient's oxygen level yesterday and it was 81-90%. Patient has been taking diuretic as prescribed but patient's daughter reports dietary  indiscretion.        History obtained from : Patient's daughter  Review of Systems   Constitutional:  Negative for chills and fever.   HENT:  Negative for congestion and rhinorrhea.    Respiratory:  Positive for shortness of breath and wheezing. Negative for cough.    Cardiovascular:  Positive for leg swelling. Negative for chest pain and palpitations.   Musculoskeletal:  Positive for gait problem.   Psychiatric/Behavioral:          Baseline dementia     Medical History Reviewed by provider this encounter:  Meds  Problems       Past Medical History   Past Medical History:   Diagnosis Date    Acute deep vein thrombosis (DVT) of brachial vein of left upper extremity (HCC) 11/24/2017    Acute deep vein thrombosis (DVT) of left peroneal vein ([facility])     Acute ST elevation myocardial infarction ([facility])     Aortic valve stenosis     Arthritis     Atrial fibrillation ([facility])     Basilar artery stenosis     Basilar artery stenosis     Basilar artery stenosis 05/04/2020    MRA Head wo contrast (12/13/2019)  IMPRESSION:   Multifocal intracranial atherosclerotic disease with moderate to severe stenosis at the origin of the left M1 segment with additional atherosclerotic disease noted in the distal right M1 and proximal inferior left M2 branches.   Moderate to severe stenosis in the proximal and mid basilar artery with nonvisualization of the right intradural vertebral    Benign prostatic hyperplasia with lower urinary tract symptoms     CAD (coronary artery disease)     CAD in native artery 11/24/2017    Underwent cardiac catheterization on 1/30/2020 and had mid and distal LAD stent placed  Cardiac PCI (1/30/2020)  SUMMARY   CORONARY CIRCULATION: Mid LAD: There was a 90 % stenosis at the site of a prior stent. Distal LAD: There was a 90 % stenosis at the site of a prior stent. Left posterior descending artery: There was a diffuse 50 % stenosis.   1ST LESION INTERVENTIONS: A balloon angioplasty wit    Cerebrovascular small vessel  disease 11/12/2020    Chronic kidney disease     Closed fracture of multiple ribs of left side with routine healing 09/03/2020    Coronary artery disease     Dementia (HCC)     Diabetes mellitus (HCC)     DM2 (diabetes mellitus, type 2) (HCC)     Elevated troponin 07/19/2021    Herpes zoster without complication 07/28/2021    History of CVA (cerebrovascular accident) 02/21/2019    History of DVT of peroneal vein left lower extremity 12/16/2019    History of transfusion     HLD (hyperlipidemia)     HTN (hypertension)     Infected sebaceous cyst of skin 06/08/2021    Lump in chest 06/01/2021    Middle cerebral artery stenosis     Mild to moderate aortic stenosis 10/09/2018    ECHO (7/2020)  AORTIC VALVE: Leaflets exhibited moderately increased thickness and moderate calcification. Transaortic velocity was increased due to valvular stenosis. There was mild to moderate stenosis. RICHY 1.4cm, mean pressure gradient 11mmHg, peak velocity 2.15m/s There was mild regurgitation.    Myocardial infarction ([facility])     Near syncope 07/19/2021    Other proteinuria 10/08/2019    Proteinuria     Rib fractures (right) 07/31/2020    Stroke ([facility])     Symptomatic bradycardia     Transient cerebral ischemia     Vitamin D deficiency      Past Surgical History:   Procedure Laterality Date    CARDIAC CATHETERIZATION      Outcome: successful; last assessed: 02/03/2015    CARDIAC CATHETERIZATION  11/26/2019    CORONARY ANGIOPLASTY WITH STENT PLACEMENT  2008    stent to LAD     CORONARY ARTERY BYPASS GRAFT      HAND SURGERY      thumb    HIP HARDWARE REMOVAL      JOINT REPLACEMENT      TOTAL HIP ARTHROPLASTY Right     TOTAL HIP ARTHROPLASTY Bilateral      Family History   Problem Relation Age of Onset    Emphysema Father     Emphysema Sister      Current Outpatient Medications on File Prior to Visit   Medication Sig Dispense Refill    acetaminophen (TYLENOL) 500 mg tablet Take 500 mg by mouth every 6 (six) hours as needed for mild pain       amLODIPine (NORVASC) 10 mg tablet TAKE 1 TABLET BY MOUTH DAILY 90 tablet 3    apixaban (ELIQUIS) 2.5 mg Take 1 tablet (2.5 mg total) by mouth 2 (two) times a day 180 tablet 3    aspirin (ECOTRIN LOW STRENGTH) 81 mg EC tablet Take 1 tablet (81 mg total) by mouth daily      Blood Glucose Monitoring Suppl (ONE TOUCH ULTRA MINI) w/Device KIT Use daily 1 kit 0    cholecalciferol (VITAMIN D3) 1,000 units tablet Take 1,000 Units by mouth daily      finasteride (PROSCAR) 5 mg tablet TAKE 1 TABLET BY MOUTH DAILY 90 tablet 3    furosemide (LASIX) 40 mg tablet TAKE 1 TABLET BY MOUTH DAILY 90 tablet 3    gabapentin (NEURONTIN) 100 mg capsule TAKE 1 CAPSULE BY MOUTH TWICE  DAILY 180 capsule 3    glipiZIDE (GLUCOTROL XL) 2.5 mg 24 hr tablet TAKE 1 TABLET BY MOUTH DAILY 90 tablet 3    glucose blood (OneTouch Ultra) test strip Check blood sugars once daily. Please substitute with appropriate alternative as covered by patient's insurance. Dx: E11.65 100 each 3    isosorbide dinitrate (ISORDIL) 10 mg tablet TAKE 1 TABLET BY MOUTH TWICE  DAILY 180 tablet 3    lisinopril (ZESTRIL) 20 mg tablet TAKE 1 TABLET BY MOUTH DAILY 90 tablet 3    metoprolol tartrate (LOPRESSOR) 25 mg tablet 1/2 tab once a day      Omega-3 Fatty Acids (FISH OIL CONCENTRATE PO) Take 1 tablet by mouth daily      vitamin B-12 (CYANOCOBALAMIN) 100 MCG tablet Take 1,000 mcg by mouth daily       No current facility-administered medications on file prior to visit.     Allergies   Allergen Reactions    Celecoxib Other (See Comments) and Hives     Per pt does NOT remember type of reaction or severity level    Hydromorphone Other (See Comments) and Hives     Per pt does NOT remember type of reaction or severity level    Pravastatin Hives    Statins Other (See Comments) and Hives     Per pt does NOT remember type of reaction or severity level      Current Outpatient Medications on File Prior to Visit   Medication Sig Dispense Refill    acetaminophen (TYLENOL) 500 mg tablet  Take 500 mg by mouth every 6 (six) hours as needed for mild pain      amLODIPine (NORVASC) 10 mg tablet TAKE 1 TABLET BY MOUTH DAILY 90 tablet 3    apixaban (ELIQUIS) 2.5 mg Take 1 tablet (2.5 mg total) by mouth 2 (two) times a day 180 tablet 3    aspirin (ECOTRIN LOW STRENGTH) 81 mg EC tablet Take 1 tablet (81 mg total) by mouth daily      Blood Glucose Monitoring Suppl (ONE TOUCH ULTRA MINI) w/Device KIT Use daily 1 kit 0    cholecalciferol (VITAMIN D3) 1,000 units tablet Take 1,000 Units by mouth daily      finasteride (PROSCAR) 5 mg tablet TAKE 1 TABLET BY MOUTH DAILY 90 tablet 3    furosemide (LASIX) 40 mg tablet TAKE 1 TABLET BY MOUTH DAILY 90 tablet 3    gabapentin (NEURONTIN) 100 mg capsule TAKE 1 CAPSULE BY MOUTH TWICE  DAILY 180 capsule 3    glipiZIDE (GLUCOTROL XL) 2.5 mg 24 hr tablet TAKE 1 TABLET BY MOUTH DAILY 90 tablet 3    glucose blood (OneTouch Ultra) test strip Check blood sugars once daily. Please substitute with appropriate alternative as covered by patient's insurance. Dx: E11.65 100 each 3    isosorbide dinitrate (ISORDIL) 10 mg tablet TAKE 1 TABLET BY MOUTH TWICE  DAILY 180 tablet 3    lisinopril (ZESTRIL) 20 mg tablet TAKE 1 TABLET BY MOUTH DAILY 90 tablet 3    metoprolol tartrate (LOPRESSOR) 25 mg tablet 1/2 tab once a day      Omega-3 Fatty Acids (FISH OIL CONCENTRATE PO) Take 1 tablet by mouth daily      vitamin B-12 (CYANOCOBALAMIN) 100 MCG tablet Take 1,000 mcg by mouth daily       No current facility-administered medications on file prior to visit.      Social History     Tobacco Use    Smoking status: Never     Passive exposure: Never    Smokeless tobacco: Never   Vaping Use    Vaping status: Never Used   Substance and Sexual Activity    Alcohol use: Never    Drug use: Never    Sexual activity: Not Currently     Partners: Female     Birth control/protection: None         Objective     /74 (BP Location: Left arm, Patient Position: Sitting, Cuff Size: Standard)   Pulse (!) 50    "Temp 97.5 °F (36.4 °C) (Tympanic)   Resp 18   Ht 5' 10\" (1.778 m)   Wt 95.6 kg (210 lb 12.8 oz) Comment: wheelchair  SpO2 91%   BMI 30.25 kg/m²     Physical Exam  Constitutional:       General: He is not in acute distress.  Eyes:      Conjunctiva/sclera: Conjunctivae normal.   Neck:      Comments: JVD but patient is sitting upright as he is in a wheelchair  Cardiovascular:      Rate and Rhythm: Normal rate and regular rhythm.      Heart sounds: Murmur heard.      Systolic murmur is present.   Pulmonary:      Effort: Pulmonary effort is normal.      Breath sounds: Examination of the right-lower field reveals rales. Examination of the left-lower field reveals rales. Rales present. No wheezing.   Musculoskeletal:      Right lower le+ Pitting Edema present.      Left lower le+ Pitting Edema present.   Skin:     General: Skin is warm and dry.   Neurological:      Comments: Baseline dementia   Psychiatric:         Speech: Speech normal.         Behavior: Behavior normal. Behavior is cooperative.         "

## 2024-10-22 NOTE — PATIENT INSTRUCTIONS
Please take an extra dose of your furosemide 40 mg in the late afternoon/evening for the next three days in addition to what you take in the morning. If you can, monitor your oxygen levels and your weight

## 2024-10-22 NOTE — ASSESSMENT & PLAN NOTE
Wt Readings from Last 3 Encounters:   10/22/24 95.6 kg (210 lb 12.8 oz)   09/03/24 90.2 kg (198 lb 12.8 oz)   06/11/24 91.4 kg (201 lb 6.4 oz)     Currently on Lasix 40 mg qd, Lopressor 12.5 qd, lisinopril 20 mg qd. Management for suspected acute episode as above.

## 2024-10-23 ENCOUNTER — TELEPHONE (OUTPATIENT)
Age: 89
End: 2024-10-23

## 2024-10-23 LAB
ANION GAP SERPL CALCULATED.3IONS-SCNC: 9 MMOL/L (ref 4–13)
BUN SERPL-MCNC: 34 MG/DL (ref 5–25)
CALCIUM SERPL-MCNC: 9.1 MG/DL (ref 8.4–10.2)
CHLORIDE SERPL-SCNC: 98 MMOL/L (ref 96–108)
CO2 SERPL-SCNC: 28 MMOL/L (ref 21–32)
CREAT SERPL-MCNC: 2.01 MG/DL (ref 0.6–1.3)
GFR SERPL CREATININE-BSD FRML MDRD: 29 ML/MIN/1.73SQ M
GLUCOSE P FAST SERPL-MCNC: 295 MG/DL (ref 65–99)
POTASSIUM SERPL-SCNC: 4.7 MMOL/L (ref 3.5–5.3)
SODIUM SERPL-SCNC: 135 MMOL/L (ref 135–147)

## 2024-10-23 NOTE — TELEPHONE ENCOUNTER
----- Message from Thang Tripathi MD sent at 10/23/2024 10:13 AM EDT -----  CXR does show fluid buildup in the left lung. Please see recommendations in separate lab result note

## 2024-10-23 NOTE — TELEPHONE ENCOUNTER
----- Message from Thang Tripathi MD sent at 10/23/2024 10:12 AM EDT -----  Please let patient know that his labs were consistent with acute exacerbation of heart failure being likely source of his symptoms. If no improvement with increased dose of diuretics, given his desaturation the other day, I would want him to consider   presenting to the ED for IV diuretics

## 2024-10-25 ENCOUNTER — APPOINTMENT (EMERGENCY)
Dept: RADIOLOGY | Facility: HOSPITAL | Age: 89
DRG: 291 | End: 2024-10-25
Payer: MEDICARE

## 2024-10-25 ENCOUNTER — HOSPITAL ENCOUNTER (INPATIENT)
Facility: HOSPITAL | Age: 89
LOS: 5 days | Discharge: NURSING, REHAB OR LONG-TERM CARE | DRG: 291 | End: 2024-10-30
Attending: EMERGENCY MEDICINE
Payer: MEDICARE

## 2024-10-25 DIAGNOSIS — I50.9 CHF (CONGESTIVE HEART FAILURE) (HCC): Primary | ICD-10-CM

## 2024-10-25 DIAGNOSIS — I50.9 CHF EXACERBATION (HCC): ICD-10-CM

## 2024-10-25 DIAGNOSIS — G30.8 OTHER ALZHEIMER'S DISEASE (HCC): ICD-10-CM

## 2024-10-25 DIAGNOSIS — R54 FRAILTY: ICD-10-CM

## 2024-10-25 DIAGNOSIS — F02.80 OTHER ALZHEIMER'S DISEASE (HCC): ICD-10-CM

## 2024-10-25 DIAGNOSIS — R79.89 ELEVATED TROPONIN: ICD-10-CM

## 2024-10-25 DIAGNOSIS — I48.0 PAROXYSMAL ATRIAL FIBRILLATION (HCC): Chronic | ICD-10-CM

## 2024-10-25 PROBLEM — J96.00 ACUTE RESPIRATORY FAILURE (HCC): Status: ACTIVE | Noted: 2024-10-25

## 2024-10-25 PROBLEM — I50.33 ACUTE ON CHRONIC HEART FAILURE WITH PRESERVED EJECTION FRACTION (HCC): Status: ACTIVE | Noted: 2023-08-14

## 2024-10-25 LAB
2HR DELTA HS TROPONIN: 2 NG/L
ANION GAP SERPL CALCULATED.3IONS-SCNC: 8 MMOL/L (ref 4–13)
BASOPHILS # BLD AUTO: 0.04 THOUSANDS/ΜL (ref 0–0.1)
BASOPHILS NFR BLD AUTO: 1 % (ref 0–1)
BNP SERPL-MCNC: 2374 PG/ML (ref 0–100)
BUN SERPL-MCNC: 37 MG/DL (ref 5–25)
CALCIUM SERPL-MCNC: 9.3 MG/DL (ref 8.4–10.2)
CARDIAC TROPONIN I PNL SERPL HS: 67 NG/L
CARDIAC TROPONIN I PNL SERPL HS: 69 NG/L
CHLORIDE SERPL-SCNC: 98 MMOL/L (ref 96–108)
CO2 SERPL-SCNC: 28 MMOL/L (ref 21–32)
CREAT SERPL-MCNC: 1.94 MG/DL (ref 0.6–1.3)
EOSINOPHIL # BLD AUTO: 0.1 THOUSAND/ΜL (ref 0–0.61)
EOSINOPHIL NFR BLD AUTO: 2 % (ref 0–6)
ERYTHROCYTE [DISTWIDTH] IN BLOOD BY AUTOMATED COUNT: 15.3 % (ref 11.6–15.1)
GFR SERPL CREATININE-BSD FRML MDRD: 30 ML/MIN/1.73SQ M
GLUCOSE SERPL-MCNC: 166 MG/DL (ref 65–140)
GLUCOSE SERPL-MCNC: 202 MG/DL (ref 65–140)
HCT VFR BLD AUTO: 34 % (ref 36.5–49.3)
HGB BLD-MCNC: 10.7 G/DL (ref 12–17)
IMM GRANULOCYTES # BLD AUTO: 0.02 THOUSAND/UL (ref 0–0.2)
IMM GRANULOCYTES NFR BLD AUTO: 0 % (ref 0–2)
LYMPHOCYTES # BLD AUTO: 0.59 THOUSANDS/ΜL (ref 0.6–4.47)
LYMPHOCYTES NFR BLD AUTO: 10 % (ref 14–44)
MCH RBC QN AUTO: 30.7 PG (ref 26.8–34.3)
MCHC RBC AUTO-ENTMCNC: 31.5 G/DL (ref 31.4–37.4)
MCV RBC AUTO: 98 FL (ref 82–98)
MONOCYTES # BLD AUTO: 0.39 THOUSAND/ΜL (ref 0.17–1.22)
MONOCYTES NFR BLD AUTO: 7 % (ref 4–12)
NEUTROPHILS # BLD AUTO: 4.7 THOUSANDS/ΜL (ref 1.85–7.62)
NEUTS SEG NFR BLD AUTO: 80 % (ref 43–75)
NRBC BLD AUTO-RTO: 0 /100 WBCS
PLATELET # BLD AUTO: 160 THOUSANDS/UL (ref 149–390)
PMV BLD AUTO: 10 FL (ref 8.9–12.7)
POTASSIUM SERPL-SCNC: 4.3 MMOL/L (ref 3.5–5.3)
RBC # BLD AUTO: 3.48 MILLION/UL (ref 3.88–5.62)
SODIUM SERPL-SCNC: 134 MMOL/L (ref 135–147)
WBC # BLD AUTO: 5.84 THOUSAND/UL (ref 4.31–10.16)

## 2024-10-25 PROCEDURE — 83880 ASSAY OF NATRIURETIC PEPTIDE: CPT | Performed by: EMERGENCY MEDICINE

## 2024-10-25 PROCEDURE — 99223 1ST HOSP IP/OBS HIGH 75: CPT

## 2024-10-25 PROCEDURE — 84484 ASSAY OF TROPONIN QUANT: CPT

## 2024-10-25 PROCEDURE — 36415 COLL VENOUS BLD VENIPUNCTURE: CPT | Performed by: EMERGENCY MEDICINE

## 2024-10-25 PROCEDURE — 71045 X-RAY EXAM CHEST 1 VIEW: CPT

## 2024-10-25 PROCEDURE — 85025 COMPLETE CBC W/AUTO DIFF WBC: CPT | Performed by: EMERGENCY MEDICINE

## 2024-10-25 PROCEDURE — 99285 EMERGENCY DEPT VISIT HI MDM: CPT

## 2024-10-25 PROCEDURE — 84484 ASSAY OF TROPONIN QUANT: CPT | Performed by: EMERGENCY MEDICINE

## 2024-10-25 PROCEDURE — 82948 REAGENT STRIP/BLOOD GLUCOSE: CPT

## 2024-10-25 PROCEDURE — 93005 ELECTROCARDIOGRAM TRACING: CPT

## 2024-10-25 PROCEDURE — 80048 BASIC METABOLIC PNL TOTAL CA: CPT | Performed by: EMERGENCY MEDICINE

## 2024-10-25 RX ORDER — ISOSORBIDE DINITRATE 10 MG/1
10 TABLET ORAL 2 TIMES DAILY
Status: DISCONTINUED | OUTPATIENT
Start: 2024-10-25 | End: 2024-10-30 | Stop reason: HOSPADM

## 2024-10-25 RX ORDER — LISINOPRIL 20 MG/1
20 TABLET ORAL DAILY
Status: DISCONTINUED | OUTPATIENT
Start: 2024-10-26 | End: 2024-10-30 | Stop reason: HOSPADM

## 2024-10-25 RX ORDER — METOPROLOL TARTRATE 25 MG/1
25 TABLET, FILM COATED ORAL DAILY
Status: DISCONTINUED | OUTPATIENT
Start: 2024-10-26 | End: 2024-10-30 | Stop reason: HOSPADM

## 2024-10-25 RX ORDER — FUROSEMIDE 10 MG/ML
40 INJECTION INTRAMUSCULAR; INTRAVENOUS ONCE
Status: COMPLETED | OUTPATIENT
Start: 2024-10-25 | End: 2024-10-25

## 2024-10-25 RX ORDER — AMLODIPINE BESYLATE 10 MG/1
10 TABLET ORAL DAILY
Status: DISCONTINUED | OUTPATIENT
Start: 2024-10-26 | End: 2024-10-30 | Stop reason: HOSPADM

## 2024-10-25 RX ORDER — LIDOCAINE 50 MG/G
1 PATCH TOPICAL DAILY
Status: DISCONTINUED | OUTPATIENT
Start: 2024-10-25 | End: 2024-10-30 | Stop reason: HOSPADM

## 2024-10-25 RX ORDER — SODIUM CHLORIDE 9 MG/ML
3 INJECTION INTRAVENOUS
Status: DISCONTINUED | OUTPATIENT
Start: 2024-10-25 | End: 2024-10-30 | Stop reason: HOSPADM

## 2024-10-25 RX ORDER — ACETAMINOPHEN 325 MG/1
650 TABLET ORAL EVERY 4 HOURS PRN
Status: DISCONTINUED | OUTPATIENT
Start: 2024-10-25 | End: 2024-10-30 | Stop reason: HOSPADM

## 2024-10-25 RX ORDER — FUROSEMIDE 10 MG/ML
50 INJECTION INTRAMUSCULAR; INTRAVENOUS
Status: DISCONTINUED | OUTPATIENT
Start: 2024-10-26 | End: 2024-10-26

## 2024-10-25 RX ORDER — INSULIN LISPRO 100 [IU]/ML
1-6 INJECTION, SOLUTION INTRAVENOUS; SUBCUTANEOUS
Status: DISCONTINUED | OUTPATIENT
Start: 2024-10-26 | End: 2024-10-30 | Stop reason: HOSPADM

## 2024-10-25 RX ORDER — FINASTERIDE 5 MG/1
5 TABLET, FILM COATED ORAL DAILY
Status: DISCONTINUED | OUTPATIENT
Start: 2024-10-26 | End: 2024-10-30 | Stop reason: HOSPADM

## 2024-10-25 RX ORDER — ASPIRIN 81 MG/1
81 TABLET ORAL DAILY
Status: DISCONTINUED | OUTPATIENT
Start: 2024-10-26 | End: 2024-10-30 | Stop reason: HOSPADM

## 2024-10-25 RX ADMIN — FUROSEMIDE 40 MG: 10 INJECTION, SOLUTION INTRAMUSCULAR; INTRAVENOUS at 20:26

## 2024-10-25 RX ADMIN — APIXABAN 2.5 MG: 2.5 TABLET, FILM COATED ORAL at 22:01

## 2024-10-25 RX ADMIN — ISOSORBIDE DINITRATE 10 MG: 10 TABLET ORAL at 22:01

## 2024-10-26 PROBLEM — J96.01 ACUTE RESPIRATORY FAILURE WITH HYPOXIA (HCC): Status: ACTIVE | Noted: 2024-10-25

## 2024-10-26 LAB
4HR DELTA HS TROPONIN: 5 NG/L
ALBUMIN SERPL BCG-MCNC: 4.2 G/DL (ref 3.5–5)
ALP SERPL-CCNC: 47 U/L (ref 34–104)
ALT SERPL W P-5'-P-CCNC: 23 U/L (ref 7–52)
ANION GAP SERPL CALCULATED.3IONS-SCNC: 8 MMOL/L (ref 4–13)
AST SERPL W P-5'-P-CCNC: 19 U/L (ref 13–39)
BILIRUB SERPL-MCNC: 0.9 MG/DL (ref 0.2–1)
BUN SERPL-MCNC: 37 MG/DL (ref 5–25)
CALCIUM SERPL-MCNC: 9.4 MG/DL (ref 8.4–10.2)
CARDIAC TROPONIN I PNL SERPL HS: 72 NG/L
CHLORIDE SERPL-SCNC: 99 MMOL/L (ref 96–108)
CO2 SERPL-SCNC: 28 MMOL/L (ref 21–32)
CREAT SERPL-MCNC: 1.85 MG/DL (ref 0.6–1.3)
ERYTHROCYTE [DISTWIDTH] IN BLOOD BY AUTOMATED COUNT: 15.2 % (ref 11.6–15.1)
GFR SERPL CREATININE-BSD FRML MDRD: 32 ML/MIN/1.73SQ M
GLUCOSE SERPL-MCNC: 125 MG/DL (ref 65–140)
GLUCOSE SERPL-MCNC: 144 MG/DL (ref 65–140)
GLUCOSE SERPL-MCNC: 148 MG/DL (ref 65–140)
GLUCOSE SERPL-MCNC: 187 MG/DL (ref 65–140)
GLUCOSE SERPL-MCNC: 206 MG/DL (ref 65–140)
HCT VFR BLD AUTO: 36.2 % (ref 36.5–49.3)
HGB BLD-MCNC: 11.4 G/DL (ref 12–17)
MAGNESIUM SERPL-MCNC: 2.2 MG/DL (ref 1.9–2.7)
MCH RBC QN AUTO: 31.7 PG (ref 26.8–34.3)
MCHC RBC AUTO-ENTMCNC: 31.5 G/DL (ref 31.4–37.4)
MCV RBC AUTO: 101 FL (ref 82–98)
PHOSPHATE SERPL-MCNC: 3.9 MG/DL (ref 2.3–4.1)
PLATELET # BLD AUTO: 172 THOUSANDS/UL (ref 149–390)
PMV BLD AUTO: 10.4 FL (ref 8.9–12.7)
POTASSIUM SERPL-SCNC: 4.1 MMOL/L (ref 3.5–5.3)
PROT SERPL-MCNC: 7.6 G/DL (ref 6.4–8.4)
RBC # BLD AUTO: 3.6 MILLION/UL (ref 3.88–5.62)
SODIUM SERPL-SCNC: 135 MMOL/L (ref 135–147)
TSH SERPL DL<=0.05 MIU/L-ACNC: 2.55 UIU/ML (ref 0.45–4.5)
WBC # BLD AUTO: 6.33 THOUSAND/UL (ref 4.31–10.16)

## 2024-10-26 PROCEDURE — 84100 ASSAY OF PHOSPHORUS: CPT

## 2024-10-26 PROCEDURE — 84484 ASSAY OF TROPONIN QUANT: CPT

## 2024-10-26 PROCEDURE — 99232 SBSQ HOSP IP/OBS MODERATE 35: CPT | Performed by: INTERNAL MEDICINE

## 2024-10-26 PROCEDURE — 85027 COMPLETE CBC AUTOMATED: CPT

## 2024-10-26 PROCEDURE — 80053 COMPREHEN METABOLIC PANEL: CPT

## 2024-10-26 PROCEDURE — 84443 ASSAY THYROID STIM HORMONE: CPT | Performed by: INTERNAL MEDICINE

## 2024-10-26 PROCEDURE — 99222 1ST HOSP IP/OBS MODERATE 55: CPT | Performed by: INTERNAL MEDICINE

## 2024-10-26 PROCEDURE — 82948 REAGENT STRIP/BLOOD GLUCOSE: CPT

## 2024-10-26 PROCEDURE — 83735 ASSAY OF MAGNESIUM: CPT

## 2024-10-26 RX ORDER — FUROSEMIDE 10 MG/ML
60 INJECTION INTRAMUSCULAR; INTRAVENOUS
Status: DISCONTINUED | OUTPATIENT
Start: 2024-10-26 | End: 2024-10-29

## 2024-10-26 RX ADMIN — FUROSEMIDE 60 MG: 10 INJECTION, SOLUTION INTRAMUSCULAR; INTRAVENOUS at 17:02

## 2024-10-26 RX ADMIN — APIXABAN 2.5 MG: 2.5 TABLET, FILM COATED ORAL at 10:49

## 2024-10-26 RX ADMIN — AMLODIPINE BESYLATE 10 MG: 10 TABLET ORAL at 10:49

## 2024-10-26 RX ADMIN — FINASTERIDE 5 MG: 5 TABLET, FILM COATED ORAL at 10:49

## 2024-10-26 RX ADMIN — APIXABAN 2.5 MG: 2.5 TABLET, FILM COATED ORAL at 17:02

## 2024-10-26 RX ADMIN — INSULIN LISPRO 2 UNITS: 100 INJECTION, SOLUTION INTRAVENOUS; SUBCUTANEOUS at 17:03

## 2024-10-26 RX ADMIN — INSULIN LISPRO 1 UNITS: 100 INJECTION, SOLUTION INTRAVENOUS; SUBCUTANEOUS at 12:29

## 2024-10-26 RX ADMIN — ASPIRIN 81 MG: 81 TABLET, COATED ORAL at 10:49

## 2024-10-26 RX ADMIN — METOPROLOL TARTRATE 25 MG: 25 TABLET, FILM COATED ORAL at 10:49

## 2024-10-26 RX ADMIN — FUROSEMIDE 60 MG: 10 INJECTION, SOLUTION INTRAMUSCULAR; INTRAVENOUS at 10:49

## 2024-10-26 RX ADMIN — ISOSORBIDE DINITRATE 10 MG: 10 TABLET ORAL at 10:49

## 2024-10-26 RX ADMIN — LISINOPRIL 20 MG: 20 TABLET ORAL at 10:49

## 2024-10-26 RX ADMIN — ISOSORBIDE DINITRATE 10 MG: 10 TABLET ORAL at 17:03

## 2024-10-26 NOTE — CONSULTS
Consultation - Cardiology   Name: Tom Stewart 88 y.o. male I MRN: [id number]  Unit/Bed#: -01 I Date of Admission: 10/25/2024   Date of Service: 10/26/2024 I Hospital Day: 1   Inpatient consult to Cardiology  Consult performed by: JALEEL Salazar  Consult ordered by: Candelaria Whiteside PA-C        Physician Requesting Evaluation: Jake Bell DO   Reason for Evaluation / Principal Problem: CHF     Assessment & Plan  Acute on chronic heart failure with preserved ejection fraction (HCC)  Wt Readings from Last 3 Encounters:   10/26/24 92.4 kg (203 lb 11.3 oz)   10/22/24 95.6 kg (210 lb 12.8 oz)   09/03/24 90.2 kg (198 lb 12.8 oz)   Continue IV Lasix 60 mg twice daily, lisinopril, metoprolol tartrate  Strict I's and O's and daily weights  2000 mg sodium restricted diet    Acute respiratory failure with hypoxia (HCC)  Management per primary team  Coronary artery disease involving native coronary artery of native heart without angina pectoris  Patient presented with complaints of chest pain  Plan for ischemic evaluation early next week, plan to discuss with patient once more alert and his daughter  Continue aspirin, Isordil, metoprolol tartrate  Paroxysmal atrial fibrillation (HCC)  Currently rate controlled with metoprolol tartrate 25 mg daily  Continue Eliquis for low-dose for anticoagulation  Continue to monitor heart rate trends on telemetry  Type 2 diabetes mellitus with stage 4 chronic kidney disease, without long-term current use of insulin (HCC)  Lab Results   Component Value Date    HGBA1C 7.1 (H) 06/11/2024       Recent Labs     10/25/24  2208 10/26/24  0652   POCGLU 166* 144*       Blood Sugar Average: Last 72 hrs:  (P) 155  Management per primary team  Stage 4 chronic kidney disease (HCC)  Lab Results   Component Value Date    EGFR 32 10/26/2024    EGFR 30 10/25/2024    EGFR 29 10/22/2024    CREATININE 1.85 (H) 10/26/2024    CREATININE 1.94 (H) 10/25/2024    CREATININE 2.01 (H) 10/22/2024    Continue to monitor creatinine closely in light of IV Lasix use  Other Alzheimer's disease (HCC)    History of CVA (cerebrovascular accident)    Primary hypertension  Blood pressure well-controlled with amlodipine, Isordil, lisinopril, metoprolol tartrate  Mixed hyperlipidemia due to type 2 diabetes mellitus  (HCC)  Lab Results   Component Value Date    HGBA1C 7.1 (H) 06/11/2024       Recent Labs     10/25/24  2208 10/26/24  0652   POCGLU 166* 144*       Blood Sugar Average: Last 72 hrs:  (P) 155        History of Present Illness   Tom Stewart is a 88 y.o. male with past medical history of paroxysmal atrial fibrillation, CAD s/p multiple PCI, chronic HFpEF, hypertension, moderate aortic stenosis, Alzheimer's disease CKD stage IV, diabetes type 2, who presented to the emergency room with complaints of shortness of breath.  Patient does have dementia and is a poor historian but the daughter relayed in the emergency room that he had been experiencing chest pain and shortness of breath for several weeks.  He is also noted to have increased lower extremity swelling and was evaluated by his PCP who instructed him to increase his Lasix to 3 times a day.  Daughter feels as though he had not been taking his medications appropriately and this may have led to fluid buildup.  Will plan for stress testing early next week, will discuss with patient when he is little more alert and oriented and/or with the daughter as well.    Chest x-ray is currently pending.  BNP also significantly elevated at 2374.    Review of Systems   Reason unable to perform ROS: pt is minimally verbal, underlting dementia.         Objective :  Temp:  [97.4 °F (36.3 °C)-97.9 °F (36.6 °C)] 97.5 °F (36.4 °C)  HR:  [64-72] 64  BP: (136-166)/(75-93) 148/92  Resp:  [16-24] 16  SpO2:  [85 %-94 %] 94 %  O2 Device: Nasal cannula  Nasal Cannula O2 Flow Rate (L/min):  [2 L/min] 2 L/min  Orthostatic Blood Pressures      Flowsheet Row Most Recent Value   Blood  Pressure 148/92 filed at 10/26/2024 0750          First Weight: Weight - Scale: 91.8 kg (202 lb 6.1 oz) (10/25/24 2102)  Vitals:    10/25/24 2102 10/26/24 0549   Weight: 91.8 kg (202 lb 6.1 oz) 92.4 kg (203 lb 11.3 oz)       Physical Exam  Constitutional:       Appearance: Normal appearance.   HENT:      Mouth/Throat:      Mouth: Mucous membranes are moist.   Cardiovascular:      Rate and Rhythm: Normal rate and regular rhythm.      Pulses: Normal pulses.      Heart sounds: Normal heart sounds.   Pulmonary:      Effort: Pulmonary effort is normal.      Breath sounds: Normal breath sounds.   Abdominal:      General: Abdomen is flat.   Musculoskeletal:      Cervical back: Normal range of motion.      Right lower leg: Edema present.      Left lower leg: Edema present.   Neurological:      Mental Status: He is alert.   Psychiatric:         Mood and Affect: Mood normal.           Lab Results: I have reviewed the following results:  Results from last 7 days   Lab Units 10/26/24  0534 10/25/24  1932   WBC Thousand/uL 6.33 5.84   HEMOGLOBIN g/dL 11.4* 10.7*   HEMATOCRIT % 36.2* 34.0*   PLATELETS Thousands/uL 172 160     Results from last 7 days   Lab Units 10/26/24  0534 10/25/24  1932 10/22/24  1033   POTASSIUM mmol/L 4.1 4.3 4.7   CHLORIDE mmol/L 99 98 98   CO2 mmol/L 28 28 28   BUN mg/dL 37* 37* 34*   CREATININE mg/dL 1.85* 1.94* 2.01*   CALCIUM mg/dL 9.4 9.3 9.1         Lab Results   Component Value Date    HGBA1C 7.1 (H) 06/11/2024     Lab Results   Component Value Date    TROPONINI 0.02 10/14/2021

## 2024-10-26 NOTE — ASSESSMENT & PLAN NOTE
Wt Readings from Last 3 Encounters:   10/26/24 92.4 kg (203 lb 11.3 oz)   10/22/24 95.6 kg (210 lb 12.8 oz)   09/03/24 90.2 kg (198 lb 12.8 oz)   Continue IV Lasix 60 mg twice daily, lisinopril, metoprolol tartrate  Strict I's and O's and daily weights  2000 mg sodium restricted diet

## 2024-10-26 NOTE — ASSESSMENT & PLAN NOTE
Cr 1.94 within baseline  Continue to monitor Cr, avoid nephrotoxic agents  Lab Results   Component Value Date    EGFR 30 10/25/2024    EGFR 29 10/22/2024    EGFR 28 01/02/2024    CREATININE 1.94 (H) 10/25/2024    CREATININE 2.01 (H) 10/22/2024    CREATININE 2.03 (H) 01/02/2024

## 2024-10-26 NOTE — ASSESSMENT & PLAN NOTE
Lab Results   Component Value Date    HGBA1C 7.1 (H) 06/11/2024     Recent Labs     10/25/24  1932 10/25/24  2208 10/26/24  0534 10/26/24  0652 10/26/24  1135 10/26/24  1549   POCGLU  --    < >  --  144* 187* 206*   GLUC 202*  --  148*  --   --   --     < > = values in this interval not displayed.     Hold home regimen while inpatient and resume on discharge  Diabetic diet  Insulin regimen  Glucose checks and Insulin correction ACHS  Goal -180 while admitted, adjusting insulin regimen as appropriate  Monitor for hypoglycemia and treat per protocol

## 2024-10-26 NOTE — ASSESSMENT & PLAN NOTE
Currently SpO2: 95 % on Nasal Cannula O2 Flow Rate (L/min): 2 L/min   On room air at home, 85% on arrival  Wean oxygen as appropriate

## 2024-10-26 NOTE — ASSESSMENT & PLAN NOTE
Most recent A1c 7.1 indicating marginal glycemic control  SSI ordered  Monitor accu-cheks closely  Avoid hypoglycemia  Lab Results   Component Value Date    HGBA1C 7.1 (H) 06/11/2024       Recent Labs     10/25/24  2208   POCGLU 166*       Blood Sugar Average: Last 72 hrs:  (P) 166

## 2024-10-26 NOTE — ASSESSMENT & PLAN NOTE
Lab Results   Component Value Date    HGBA1C 7.1 (H) 06/11/2024       Recent Labs     10/25/24  2208 10/26/24  0652   POCGLU 166* 144*       Blood Sugar Average: Last 72 hrs:  (P) 155  Management per primary team

## 2024-10-26 NOTE — ASSESSMENT & PLAN NOTE
Hx of CAD s/p PCI Jan 2020  C/o chest pain, EKG without ischemia. Suspected d/t CHF exacerbation  Continue pta ASA and eliquis

## 2024-10-26 NOTE — ASSESSMENT & PLAN NOTE
Patient presented with complaints of chest pain  Plan for ischemic evaluation early next week, plan to discuss with patient once more alert and his daughter  Continue aspirin, Isordil, metoprolol tartrate

## 2024-10-26 NOTE — ASSESSMENT & PLAN NOTE
Lab Results   Component Value Date    HGBA1C 7.1 (H) 06/11/2024       Recent Labs     10/25/24  2208 10/26/24  0652 10/26/24  1135 10/26/24  1549   POCGLU 166* 144* 187* 206*       Blood Sugar Average: Last 72 hrs:  (P) 175.75

## 2024-10-26 NOTE — ASSESSMENT & PLAN NOTE
Blood Pressure: 136/69    Plan:  Continue home meds  Monitor blood pressure  PRN IV Labetalol and Hydralazine for SBP > 160

## 2024-10-26 NOTE — ASSESSMENT & PLAN NOTE
Currently rate controlled with metoprolol tartrate 25 mg daily  Continue Eliquis for low-dose for anticoagulation  Continue to monitor heart rate trends on telemetry

## 2024-10-26 NOTE — PROGRESS NOTES
Progress Note - Hospitalist   Name: Tom Stewart 88 y.o. male I MRN: [id number]  Unit/Bed#: -01 I Date of Admission: 10/25/2024   Date of Service: 10/26/2024 I Hospital Day: 1    Assessment & Plan  Acute on chronic heart failure with preserved ejection fraction (HCC)  Presents to ED c/o worsening SOB and chest pain/pressure.  Patient poor historian, most of history obtained from daughter  Per chart review, recently seen by PCP 10/22 patient complaining of SOB and leg swelling with reported O2 sat 81-90%  Patient was instructed to increase home Lasix to twice daily x 3 days and report to ED if O2 dropped below 90  Daughter reports patient has poor medication adherence due to dementia.  He lives at home with girlfriend who does not help him with his medication, daughter says she helps him as much as she can    Lab Results   Component Value Date    LVEF 50 08/15/2023    BNP 2,374 (H) 10/25/2024    BNP 2,419 (H) 10/22/2024     Presents with increased shortness of breath  CXR with possible pulmonary venous congestion, pending final read  NYHA Class III., Stage C  Patient is currently volume overloaded.- bilateral lower extremity pitting edema    Plan:  GDMT:  Diuretic: Lasix 60 mg IV BID, B-Blocker: Lopressor 25 mg BID, ACE/ARB/ARNi: Lisinopril 20 mg  Sodium restriction 2g  CMP, magnesium tomorrow a.m; Goal Mg > 2 and K > 4; Replete prn  HOB > 30°, Daily standing weights, Measure I/O  Consult nutrition for dietary education  Cards consulted  Echo ordered - pending    Acute respiratory failure with hypoxia (HCC)  Currently SpO2: 95 % on Nasal Cannula O2 Flow Rate (L/min): 2 L/min   On room air at home, 85% on arrival  Wean oxygen as appropriate  Coronary artery disease involving native coronary artery of native heart without angina pectoris  Hx of CAD s/p PCI Jan 2020  C/o chest pain, EKG without ischemia. Suspected d/t CHF exacerbation  Continue pta ASA and eliquis  Paroxysmal atrial fibrillation  (HCC)  Stable  Continue pta eliquis and lopressor  Type 2 diabetes mellitus with stage 4 chronic kidney disease, without long-term current use of insulin (HCC)  Lab Results   Component Value Date    HGBA1C 7.1 (H) 06/11/2024     Recent Labs     10/25/24  1932 10/25/24  2208 10/26/24  0534 10/26/24  0652 10/26/24  1135 10/26/24  1549   POCGLU  --    < >  --  144* 187* 206*   GLUC 202*  --  148*  --   --   --     < > = values in this interval not displayed.     Hold home regimen while inpatient and resume on discharge  Diabetic diet  Insulin regimen  Glucose checks and Insulin correction ACHS  Goal -180 while admitted, adjusting insulin regimen as appropriate  Monitor for hypoglycemia and treat per protocol      Stage 4 chronic kidney disease (HCC)  Cr 1.94 within baseline  Continue to monitor Cr, avoid nephrotoxic agents  Lab Results   Component Value Date    EGFR 32 10/26/2024    EGFR 30 10/25/2024    EGFR 29 10/22/2024    CREATININE 1.85 (H) 10/26/2024    CREATININE 1.94 (H) 10/25/2024    CREATININE 2.01 (H) 10/22/2024     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 (HCC)  Blood Pressure: 136/69   BP stable since admission  Continue pta amlodipine and lisinopril    Other Alzheimer's disease (HCC)  Pt w/ known hx of dementia, currently at baseline A&O x2  Is not on medication  Fall precautions, delirium precautions in place  PT/OT consulted  Mixed hyperlipidemia due to type 2 diabetes mellitus  (HCC)  Lab Results   Component Value Date    HGBA1C 7.1 (H) 06/11/2024       Recent Labs     10/25/24  2208 10/26/24  0652 10/26/24  1135 10/26/24  1549   POCGLU 166* 144* 187* 206*       Blood Sugar Average: Last 72 hrs:  (P) 175.75    History of CVA (cerebrovascular accident)    Primary hypertension  Blood Pressure: 136/69    Plan:  Continue home meds  Monitor blood pressure  PRN IV Labetalol and Hydralazine for SBP > 160      VTE Pharmacologic Prophylaxis: VTE Score: 7 High Risk (Score >/= 5) -  Pharmacological DVT Prophylaxis Ordered: apixaban (Eliquis). Sequential Compression Devices Ordered.    Mobility:   Basic Mobility Inpatient Raw Score: 13  JH-HLM Goal: 4: Move to chair/commode  JH-HLM Achieved: 3: Sit at edge of bed  JH-HLM Goal NOT achieved. Continue with multidisciplinary rounding and encourage appropriate mobility to improve upon JH-HLM goals.    Patient Centered Rounds: I performed bedside rounds with nursing staff today.   Discussions with Specialists or Other Care Team Provider: IP CONSULT TO CARDIOLOGY  IP CONSULT TO GERONTOLOGY     Education and Discussions with Family / Patient: Updated  (wife) via phone.    Current Length of Stay: 1 day(s)  Current Patient Status: Inpatient   Certification Statement: The patient will continue to require additional inpatient hospital stay due to plan above  Discharge Plan: Anticipate discharge in 48-72 hrs to discharge location to be determined pending rehab evaluations.    Code Status: Level 3 - DNAR and DNI    Subjective   Currently having intermittent shortness of breath. No overnight acute events noted. Patient and wife are understanding of plan.  All questions answered.     Objective :  Temp:  [97.4 °F (36.3 °C)-97.9 °F (36.6 °C)] 97.5 °F (36.4 °C)  HR:  [60-72] 60  BP: (136-166)/(69-93) 136/69  Resp:  [16-24] 17  SpO2:  [85 %-95 %] 95 %  O2 Device: Nasal cannula  Nasal Cannula O2 Flow Rate (L/min):  [2 L/min] 2 L/min    Body mass index is 29.23 kg/m².     Input and Output Summary (last 24 hours):     Intake/Output Summary (Last 24 hours) at 10/26/2024 1613  Last data filed at 10/26/2024 1501  Gross per 24 hour   Intake 480 ml   Output 900 ml   Net -420 ml       Physical Exam  Vitals and nursing note reviewed.   Constitutional:       General: He is not in acute distress.     Appearance: He is well-developed. He is ill-appearing (Chronically). He is not diaphoretic.      Interventions: Nasal cannula in place.   HENT:      Head:  Normocephalic and atraumatic.      Nose: Nose normal.   Eyes:      General: No scleral icterus.     Conjunctiva/sclera: Conjunctivae normal.      Pupils: Pupils are equal, round, and reactive to light.   Neck:      Thyroid: No thyromegaly.   Cardiovascular:      Rate and Rhythm: Normal rate and regular rhythm.      Heart sounds: Normal heart sounds. No murmur heard.  Pulmonary:      Effort: Pulmonary effort is normal.      Breath sounds: Rales present. No wheezing or rhonchi.   Abdominal:      General: Bowel sounds are normal. There is no distension.      Palpations: Abdomen is soft.      Tenderness: There is no abdominal tenderness.   Musculoskeletal:         General: No swelling, tenderness or deformity.      Cervical back: Neck supple.   Skin:     General: Skin is warm and dry.   Neurological:      Mental Status: He is alert and oriented to person, place, and time. Mental status is at baseline.   Psychiatric:         Behavior: Behavior normal. Behavior is cooperative.         Thought Content: Thought content normal.         Judgment: Judgment normal.           Lines/Drains:        Telemetry:  Telemetry Orders (From admission, onward)               24 Hour Telemetry Monitoring  (ED Bridging Orders Panel)  Continuous x 24 Hours (Telem)        Question:  Reason for 24 Hour Telemetry  Answer:  PCI/EP study (including pacer and ICD implementation), Cardiac surgery, MI, abnormal cardiac cath, and chest pain- rule out MI                     Telemetry Reviewed:   Indication for Continued Telemetry Use: Acute CHF on >200 mg lasix/day or equivalent dose or with new reduced EF.                Lab Results: I have reviewed the following results:   Results from last 7 days   Lab Units 10/26/24  0534 10/25/24  1932   WBC Thousand/uL 6.33 5.84   HEMOGLOBIN g/dL 11.4* 10.7*   HEMATOCRIT % 36.2* 34.0*   PLATELETS Thousands/uL 172 160   SEGS PCT %  --  80*   LYMPHO PCT %  --  10*   MONO PCT %  --  7   EOS PCT %  --  2     Results  from last 7 days   Lab Units 10/26/24  0534   SODIUM mmol/L 135   POTASSIUM mmol/L 4.1   CHLORIDE mmol/L 99   CO2 mmol/L 28   BUN mg/dL 37*   CREATININE mg/dL 1.85*   ANION GAP mmol/L 8   CALCIUM mg/dL 9.4   ALBUMIN g/dL 4.2   TOTAL BILIRUBIN mg/dL 0.90   ALK PHOS U/L 47   ALT U/L 23   AST U/L 19   GLUCOSE RANDOM mg/dL 148*         Results from last 7 days   Lab Units 10/26/24  1549 10/26/24  1135 10/26/24  0652 10/25/24  2208   POC GLUCOSE mg/dl 206* 187* 144* 166*               Recent Cultures (last 7 days):         Imaging Results Review: I reviewed radiology reports from this admission including: chest xray.  Other Study Results Review: No additional pertinent studies reviewed.    Last 24 Hours Medication List:     Current Facility-Administered Medications:     acetaminophen (TYLENOL) tablet 650 mg, Q4H PRN    amLODIPine (NORVASC) tablet 10 mg, Daily    apixaban (ELIQUIS) tablet 2.5 mg, BID    aspirin (ECOTRIN LOW STRENGTH) EC tablet 81 mg, Daily    finasteride (PROSCAR) tablet 5 mg, Daily    furosemide (LASIX) injection 60 mg, BID (diuretic)    insulin lispro (HumALOG/ADMELOG) 100 units/mL subcutaneous injection 1-6 Units, TID AC **AND** Fingerstick Glucose (POCT), TID AC    isosorbide dinitrate (ISORDIL) tablet 10 mg, BID    lidocaine (LIDODERM) 5 % patch 1 patch, Daily    lisinopril (ZESTRIL) tablet 20 mg, Daily    metoprolol tartrate (LOPRESSOR) tablet 25 mg, Daily    Insert peripheral IV, Once **AND** sodium chloride (PF) 0.9 % injection 3 mL, Q1H PRN    Administrative Statements   Today, Patient Was Seen By: Jake Bell DO      **Please Note: This note may have been constructed using a voice recognition system.**

## 2024-10-26 NOTE — PLAN OF CARE
Problem: PAIN - ADULT  Goal: Verbalizes/displays adequate comfort level or baseline comfort level  Description: Interventions:  - Encourage patient to monitor pain and request assistance  - Assess pain using appropriate pain scale  - Administer analgesics based on type and severity of pain and evaluate response  - Implement non-pharmacological measures as appropriate and evaluate response  - Consider cultural and social influences on pain and pain management  - Notify physician/advanced practitioner if interventions unsuccessful or patient reports new pain  Outcome: Progressing     Problem: INFECTION - ADULT  Goal: Absence or prevention of progression during hospitalization  Description: INTERVENTIONS:  - Assess and monitor for signs and symptoms of infection  - Monitor lab/diagnostic results  - Monitor all insertion sites, i.e. indwelling lines, tubes, and drains  - Monitor endotracheal if appropriate and nasal secretions for changes in amount and color  - [place] appropriate cooling/warming therapies per order  - Administer medications as ordered  - Instruct and encourage patient and family to use good hand hygiene technique  - Identify and instruct in appropriate isolation precautions for identified infection/condition  Outcome: Progressing  Goal: Absence of fever/infection during neutropenic period  Description: INTERVENTIONS:  - Monitor WBC    Outcome: Progressing     Problem: SAFETY ADULT  Goal: Patient will remain free of falls  Description: INTERVENTIONS:  - Educate patient/family on patient safety including physical limitations  - Instruct patient to call for assistance with activity   - Consult OT/PT to assist with strengthening/mobility   - Keep Call bell within reach  - Keep bed low and locked with side rails adjusted as appropriate  - Keep care items and personal belongings within reach  - Initiate and maintain comfort rounds  - Make Fall Risk Sign visible to staff  - Offer Toileting every 2 Hours,  in advance of need  - Initiate/Maintain bedalarm  - Obtain necessary fall risk management equipment:   - Apply yellow socks and bracelet for high fall risk patients  - Consider moving patient to room near nurses station  Outcome: Progressing  Goal: Maintain or return to baseline ADL function  Description: INTERVENTIONS:  -  Assess patient's ability to carry out ADLs; assess patient's baseline for ADL function and identify physical deficits which impact ability to perform ADLs (bathing, care of mouth/teeth, toileting, grooming, dressing, etc.)  - Assess/evaluate cause of self-care deficits   - Assess range of motion  - Assess patient's mobility; develop plan if impaired  - Assess patient's need for assistive devices and provide as appropriate  - Encourage maximum independence but intervene and supervise when necessary  - Involve family in performance of ADLs  - Assess for home care needs following discharge   - Consider OT consult to assist with ADL evaluation and planning for discharge  - Provide patient education as appropriate  Outcome: Progressing  Goal: Maintains/Returns to pre admission functional level  Description: INTERVENTIONS:  - Perform AM-PAC 6 Click Basic Mobility/ Daily Activity assessment daily.  - Set and communicate daily mobility goal to care team and patient/family/caregiver.   - Collaborate with rehabilitation services on mobility goals if consulted  - Perform Range of Motion 3 times a day.  - Reposition patient every 3 hours.  - Dangle patient 3 times a day  - Stand patient 3 times a day  - Ambulate patient 3 times a day  - Out of bed to chair 3 times a day   - Out of bed for meals 3 times a day  - Out of bed for toileting  - Record patient progress and toleration of activity level   Outcome: Progressing     Problem: DISCHARGE PLANNING  Goal: Discharge to home or other facility with appropriate resources  Description: INTERVENTIONS:  - Identify barriers to discharge w/patient and caregiver  -  Arrange for needed discharge resources and transportation as appropriate  - Identify discharge learning needs (meds, wound care, etc.)  - Arrange for interpretive services to assist at discharge as needed  - Refer to Case Management Department for coordinating discharge planning if the patient needs post-hospital services based on physician/advanced practitioner order or complex needs related to functional status, cognitive ability, or social support system  Outcome: Progressing     Problem: Knowledge Deficit  Goal: Patient/family/caregiver demonstrates understanding of disease process, treatment plan, medications, and discharge instructions  Description: Complete learning assessment and assess knowledge base.  Interventions:  - Provide teaching at level of understanding  - Provide teaching via preferred learning methods  Outcome: Progressing     Problem: CARDIOVASCULAR - ADULT  Goal: Maintains optimal cardiac output and hemodynamic stability  Description: INTERVENTIONS:  - Monitor I/O, vital signs and rhythm  - Monitor for S/S and trends of decreased cardiac output  - Administer and titrate ordered vasoactive medications to optimize hemodynamic stability  - Assess quality of pulses, skin color and temperature  - Assess for signs of decreased coronary artery perfusion  - Instruct patient to report change in severity of symptoms  Outcome: Progressing  Goal: Absence of cardiac dysrhythmias or at baseline rhythm  Description: INTERVENTIONS:  - Continuous cardiac monitoring, vital signs, obtain 12 lead EKG if ordered  - Administer antiarrhythmic and heart rate control medications as ordered  - Monitor electrolytes and administer replacement therapy as ordered  Outcome: Progressing     Problem: RESPIRATORY - ADULT  Goal: Achieves optimal ventilation and oxygenation  Description: INTERVENTIONS:  - Assess for changes in respiratory status  - Assess for changes in mentation and behavior  - Position to facilitate oxygenation  and minimize respiratory effort  - Oxygen administered by appropriate delivery if ordered  - Initiate smoking cessation education as indicated  - Encourage broncho-pulmonary hygiene including cough, deep breathe, Incentive Spirometry  - Assess the need for suctioning and aspirate as needed  - Assess and instruct to report SOB or any respiratory difficulty  - Respiratory Therapy support as indicated  Outcome: Progressing     Problem: MUSCULOSKELETAL - ADULT  Goal: Maintain or return mobility to safest level of function  Description: INTERVENTIONS:  - Assess patient's ability to carry out ADLs; assess patient's baseline for ADL function and identify physical deficits which impact ability to perform ADLs (bathing, care of mouth/teeth, toileting, grooming, dressing, etc.)  - Assess/evaluate cause of self-care deficits   - Assess range of motion  - Assess patient's mobility  - Assess patient's need for assistive devices and provide as appropriate  - Encourage maximum independence but intervene and supervise when necessary  - Involve family in performance of ADLs  - Assess for home care needs following discharge   - Consider OT consult to assist with ADL evaluation and planning for discharge  - Provide patient education as appropriate  Outcome: Progressing  Goal: Maintain proper alignment of affected body part  Description: INTERVENTIONS:  - Support, maintain and protect limb and body alignment  - Provide patient/ family with appropriate education  Outcome: Progressing

## 2024-10-26 NOTE — ASSESSMENT & PLAN NOTE
Pt w/ known hx of dementia, currently at baseline A&O x2  Is not on medication  Fall precautions, delirium precautions in place  PT/OT consulted

## 2024-10-26 NOTE — H&P
H&P - Hospitalist   Name: Tom Setwart 88 y.o. male I MRN: [id number]  Unit/Bed#: -01 I Date of Admission: 10/25/2024   Date of Service: 10/25/2024 I Hospital Day: 0     Assessment & Plan  Acute on chronic heart failure with preserved ejection fraction (HCC)  Presents to ED c/o worsening SOB and chest pain/pressure.  Patient poor historian, most of history obtained from daughter  Per chart review, recently seen by PCP 10/22 patient complaining of SOB and leg swelling with reported O2 sat 81-90%.  Patient was instructed to increase home Lasix to twice daily x 3 days and report to ED if O2 dropped below 90  Daughter reports patient has poor medication adherence due to dementia.  He lives at home with girlfriend who does not help him with his medication, daughter says she helps him as much as she can  CXR with possible pulmonary venous congestion although pending final read  BNP 2374, troponin positive x 2, EKG - known A Fib  Patient hypervolemic on exam as evidenced by bilateral lower extremity pitting edema  S/p 40 mg IV Lasix in ED.  50 mg IV Lasix twice daily ordered  Most recent echo Aug 2023 LVEF 50%; repeat echo ordered  Monitor I's/O's and daily weights  Fluid restriction and low-sodium diet ordered  Cardiology consulted, appreciate their recommendations  Repeat EKG if patient with active chest pain  Wt Readings from Last 3 Encounters:   10/25/24 91.8 kg (202 lb 6.1 oz)   10/22/24 95.6 kg (210 lb 12.8 oz)   09/03/24 90.2 kg (198 lb 12.8 oz)     Acute respiratory failure (HCC)  O2 85% on room air present on arrival  On room air at home  Currently SpO2: 93 % on Nasal Cannula O2 Flow Rate (L/min): 2 L/min  Wean oxygen as appropriate  Coronary artery disease involving native coronary artery of native heart without angina pectoris  Hx of CAD s/p PCI Jan 2020  C/o chest pain, EKG without ischemia. Suspected d/t CHF exacerbation  Continue pta ASA and eliquis  Paroxysmal atrial fibrillation  (HCC)  Stable  Continue pta eliquis and lopressor  Type 2 diabetes mellitus with stage 4 chronic kidney disease, without long-term current use of insulin (HCC)  Most recent A1c 7.1 indicating marginal glycemic control  SSI ordered  Monitor accu-cheks closely  Avoid hypoglycemia  Lab Results   Component Value Date    HGBA1C 7.1 (H) 06/11/2024       Recent Labs     10/25/24  2208   POCGLU 166*       Blood Sugar Average: Last 72 hrs:  (P) 166    Stage 4 chronic kidney disease (HCC)  Cr 1.94 within baseline  Continue to monitor Cr, avoid nephrotoxic agents  Lab Results   Component Value Date    EGFR 30 10/25/2024    EGFR 29 10/22/2024    EGFR 28 01/02/2024    CREATININE 1.94 (H) 10/25/2024    CREATININE 2.01 (H) 10/22/2024    CREATININE 2.03 (H) 01/02/2024     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 (HCC)  BP stable since admission  Continue pta amlodipine and lisinopril    Other Alzheimer's disease (HCC)  Pt w/ known hx of dementia, currently at baseline A&O x2  Is not on medication  Fall precautions, delirium precautions in place  PT/OT consulted      VTE Pharmacologic Prophylaxis: VTE Score: 7  AC on Eliquis  Code Status: Level 3 - DNAR and DNI   Discussion with family: Updated  (daughter) at bedside.    Anticipated Length of Stay: Patient will be admitted on an inpatient basis with an anticipated length of stay of greater than 2 midnights secondary to IV diuresis, cardiology eval.    History of Present Illness   Chief Complaint: Shortness of breath    Tom Stewart is a 88 y.o. male with a PMH of CHF, CAD, AF, DM, stage IV CKD, HTN, Alzheimer's who presents with shortness of breath.  History obtained from daughter at bedside as patient is poor historian due to dementia.  Reports chest pain and shortness of breath for several weeks.  He was seen by primary care on 10/22 with complaints of shortness of breath and bilateral leg swelling and was instructed to increase home dose  of Lasix to twice daily x 3 days.  However daughter states that she suspects patient has not been taking his medication due to memory issues from dementia.  She states he lives with his girlfriend and that his girlfriend does not help him with his medication.  Daughter states she does not think patient has had any recent falls with head strike, patient also endorses this.  Denies fever, chills, nausea, vomiting, numbness, tingling.    Review of Systems   Constitutional:  Negative for chills and fever.   Respiratory:  Positive for shortness of breath.    Cardiovascular:  Positive for chest pain and leg swelling. Negative for palpitations.   Gastrointestinal:  Negative for abdominal pain, diarrhea, nausea and vomiting.   Neurological:  Negative for numbness and headaches.       Historical Information   Past Medical History:   Diagnosis Date    Acute deep vein thrombosis (DVT) of brachial vein of left upper extremity (HCC) 11/24/2017    Acute deep vein thrombosis (DVT) of left peroneal vein ([facility])     Acute ST elevation myocardial infarction ([facility])     Aortic valve stenosis     Arthritis     Atrial fibrillation ([facility])     Basilar artery stenosis     Basilar artery stenosis     Basilar artery stenosis 05/04/2020    MRA Head wo contrast (12/13/2019)  IMPRESSION:   Multifocal intracranial atherosclerotic disease with moderate to severe stenosis at the origin of the left M1 segment with additional atherosclerotic disease noted in the distal right M1 and proximal inferior left M2 branches.   Moderate to severe stenosis in the proximal and mid basilar artery with nonvisualization of the right intradural vertebral    Benign prostatic hyperplasia with lower urinary tract symptoms     CAD (coronary artery disease)     CAD in native artery 11/24/2017    Underwent cardiac catheterization on 1/30/2020 and had mid and distal LAD stent placed  Cardiac PCI (1/30/2020)  SUMMARY   CORONARY CIRCULATION: Mid LAD: There was a 90 % stenosis  at the site of a prior stent. Distal LAD: There was a 90 % stenosis at the site of a prior stent. Left posterior descending artery: There was a diffuse 50 % stenosis.   1ST LESION INTERVENTIONS: A balloon angioplasty wit    Cerebrovascular small vessel disease 11/12/2020    Chronic kidney disease     Closed fracture of multiple ribs of left side with routine healing 09/03/2020    Coronary artery disease     Dementia (HCC)     Diabetes mellitus (HCC)     DM2 (diabetes mellitus, type 2) ([facility])     Elevated troponin 07/19/2021    Herpes zoster without complication 07/28/2021    History of CVA (cerebrovascular accident) 02/21/2019    History of DVT of peroneal vein left lower extremity 12/16/2019    History of transfusion     HLD (hyperlipidemia)     HTN (hypertension)     Infected sebaceous cyst of skin 06/08/2021    Lump in chest 06/01/2021    Middle cerebral artery stenosis     Mild to moderate aortic stenosis 10/09/2018    ECHO (7/2020)  AORTIC VALVE: Leaflets exhibited moderately increased thickness and moderate calcification. Transaortic velocity was increased due to valvular stenosis. There was mild to moderate stenosis. RICHY 1.4cm, mean pressure gradient 11mmHg, peak velocity 2.15m/s There was mild regurgitation.    Myocardial infarction ([facility])     Near syncope 07/19/2021    Other proteinuria 10/08/2019    Proteinuria     Rib fractures (right) 07/31/2020    Stroke ([facility])     Symptomatic bradycardia     Transient cerebral ischemia     Vitamin D deficiency      Past Surgical History:   Procedure Laterality Date    CARDIAC CATHETERIZATION      Outcome: successful; last assessed: 02/03/2015    CARDIAC CATHETERIZATION  11/26/2019    CORONARY ANGIOPLASTY WITH STENT PLACEMENT  2008    stent to LAD     CORONARY ARTERY BYPASS GRAFT      HAND SURGERY      thumb    HIP HARDWARE REMOVAL      JOINT REPLACEMENT      TOTAL HIP ARTHROPLASTY Right     TOTAL HIP ARTHROPLASTY Bilateral      Social History     Tobacco Use    Smoking  status: Never     Passive exposure: Never    Smokeless tobacco: Never   Vaping Use    Vaping status: Never Used   Substance and Sexual Activity    Alcohol use: Never    Drug use: Never    Sexual activity: Not Currently     Partners: Female     Birth control/protection: None     E-Cigarette/Vaping    E-Cigarette Use Never User      E-Cigarette/Vaping Substances    Nicotine No     THC No     CBD No     Flavoring No     Other No     Unknown No      Family History   Problem Relation Age of Onset    Emphysema Father     Emphysema Sister      Social History:  Marital Status:    Occupation:   Patient Pre-hospital Living Situation: Home  Patient Pre-hospital Level of Mobility: walks  Patient Pre-hospital Diet Restrictions:     Meds/Allergies   I have reviewed home medications using recent Epic encounter.  Prior to Admission medications    Medication Sig Start Date End Date Taking? Authorizing Provider   amLODIPine (NORVASC) 10 mg tablet TAKE 1 TABLET BY MOUTH DAILY 9/26/24  Yes Beto Garnett DO   apixaban (ELIQUIS) 2.5 mg Take 1 tablet (2.5 mg total) by mouth 2 (two) times a day 1/10/24  Yes Katlin Sweeney MD   aspirin (ECOTRIN LOW STRENGTH) 81 mg EC tablet Take 1 tablet (81 mg total) by mouth daily 11/30/21  Yes Katlin Sweeney MD   finasteride (PROSCAR) 5 mg tablet TAKE 1 TABLET BY MOUTH DAILY 7/16/24  Yes Beto Garnett DO   furosemide (LASIX) 40 mg tablet TAKE 1 TABLET BY MOUTH DAILY 2/9/24  Yes Beto Garnett DO   gabapentin (NEURONTIN) 100 mg capsule TAKE 1 CAPSULE BY MOUTH TWICE  DAILY 9/26/24  Yes Beto Garnett DO   glipiZIDE (GLUCOTROL XL) 2.5 mg 24 hr tablet TAKE 1 TABLET BY MOUTH DAILY 6/12/24  Yes Beto Garnett DO   isosorbide dinitrate (ISORDIL) 10 mg tablet TAKE 1 TABLET BY MOUTH TWICE  DAILY 11/9/23  Yes Beto Garnett DO   lisinopril (ZESTRIL) 20 mg tablet TAKE 1 TABLET BY MOUTH DAILY 11/9/23  Yes Beto Garnett DO   metoprolol tartrate (LOPRESSOR) 25 mg tablet 1/2 tab once a day  12/15/23  Yes Katlin Sweeney MD   Omega-3 Fatty Acids (FISH OIL CONCENTRATE PO) Take 1 tablet by mouth daily   Yes Historical Provider, MD   vitamin B-12 (CYANOCOBALAMIN) 100 MCG tablet Take 1,000 mcg by mouth daily   Yes Historical Provider, MD   acetaminophen (TYLENOL) 500 mg tablet Take 500 mg by mouth every 6 (six) hours as needed for mild pain    Historical Provider, MD   Blood Glucose Monitoring Suppl (ONE TOUCH ULTRA MINI) w/Device KIT Use daily 11/12/23   Beto Garnett DO   cholecalciferol (VITAMIN D3) 1,000 units tablet Take 1,000 Units by mouth daily    Historical Provider, MD   glucose blood (OneTouch Ultra) test strip Check blood sugars once daily. Please substitute with appropriate alternative as covered by patient's insurance. Dx: E11.65 3/7/24   Beto Garnett, DO     Allergies   Allergen Reactions    Celecoxib Other (See Comments) and Hives     Per pt does NOT remember type of reaction or severity level    Hydromorphone Other (See Comments) and Hives     Per pt does NOT remember type of reaction or severity level    Pravastatin Hives    Statins Other (See Comments) and Hives     Per pt does NOT remember type of reaction or severity level       Objective :  Temp:  [97.6 °F (36.4 °C)-97.9 °F (36.6 °C)] 97.6 °F (36.4 °C)  HR:  [65-68] 67  BP: (136-166)/(75-83) 136/83  Resp:  [20-24] 22  SpO2:  [85 %-94 %] 93 %  O2 Device: Nasal cannula  Nasal Cannula O2 Flow Rate (L/min):  [2 L/min] 2 L/min    Physical Exam  Constitutional:       General: He is not in acute distress.     Appearance: Normal appearance.      Comments: Patient is sitting upright in bed in no acute distress   Cardiovascular:      Rate and Rhythm: Normal rate. Rhythm irregular.   Pulmonary:      Effort: Pulmonary effort is normal. No respiratory distress.      Breath sounds: No wheezing, rhonchi or rales.   Abdominal:      General: Bowel sounds are normal. There is no distension.      Palpations: Abdomen is soft.      Tenderness:  There is no abdominal tenderness.   Musculoskeletal:         General: Swelling present. No tenderness.      Right lower leg: Edema present.      Left lower leg: Edema present.   Skin:     General: Skin is warm and dry.      Findings: No erythema.   Neurological:      Mental Status: He is alert. Mental status is at baseline.      Comments: Alert and oriented to person and place            Lab Results: I have reviewed the following results:  Results from last 7 days   Lab Units 10/25/24  1932   WBC Thousand/uL 5.84   HEMOGLOBIN g/dL 10.7*   HEMATOCRIT % 34.0*   PLATELETS Thousands/uL 160   SEGS PCT % 80*   LYMPHO PCT % 10*   MONO PCT % 7   EOS PCT % 2     Results from last 7 days   Lab Units 10/25/24  1932   SODIUM mmol/L 134*   POTASSIUM mmol/L 4.3   CHLORIDE mmol/L 98   CO2 mmol/L 28   BUN mg/dL 37*   CREATININE mg/dL 1.94*   ANION GAP mmol/L 8   CALCIUM mg/dL 9.3   GLUCOSE RANDOM mg/dL 202*         Results from last 7 days   Lab Units 10/25/24  2208   POC GLUCOSE mg/dl 166*     Lab Results   Component Value Date    HGBA1C 7.1 (H) 06/11/2024    HGBA1C 7.2 (H) 01/30/2024    HGBA1C 6.6 (H) 07/19/2023           Imaging Results Review: I reviewed radiology reports from this admission including: chest xray.  Other Study Results Review: EKG was personally reviewed and my interpretation is: Atrial fibrillation. HR 66..    Administrative Statements     ** Please Note: This note has been constructed using a voice recognition system. **

## 2024-10-26 NOTE — ASSESSMENT & PLAN NOTE
Lab Results   Component Value Date    HGBA1C 7.1 (H) 06/11/2024       Recent Labs     10/25/24  2208 10/26/24  0652   POCGLU 166* 144*       Blood Sugar Average: Last 72 hrs:  (P) 155

## 2024-10-26 NOTE — ASSESSMENT & PLAN NOTE
Cr 1.94 within baseline  Continue to monitor Cr, avoid nephrotoxic agents  Lab Results   Component Value Date    EGFR 32 10/26/2024    EGFR 30 10/25/2024    EGFR 29 10/22/2024    CREATININE 1.85 (H) 10/26/2024    CREATININE 1.94 (H) 10/25/2024    CREATININE 2.01 (H) 10/22/2024

## 2024-10-26 NOTE — ASSESSMENT & PLAN NOTE
Presents to ED c/o worsening SOB and chest pain/pressure.  Patient poor historian, most of history obtained from daughter  Per chart review, recently seen by PCP 10/22 patient complaining of SOB and leg swelling with reported O2 sat 81-90%  Patient was instructed to increase home Lasix to twice daily x 3 days and report to ED if O2 dropped below 90  Daughter reports patient has poor medication adherence due to dementia.  He lives at home with girlfriend who does not help him with his medication, daughter says she helps him as much as she can    Lab Results   Component Value Date    LVEF 50 08/15/2023    BNP 2,374 (H) 10/25/2024    BNP 2,419 (H) 10/22/2024     Presents with increased shortness of breath  CXR with possible pulmonary venous congestion, pending final read  NYHA Class III., Stage C  Patient is currently volume overloaded.- bilateral lower extremity pitting edema    Plan:  GDMT:  Diuretic: Lasix 60 mg IV BID, B-Blocker: Lopressor 25 mg BID, ACE/ARB/ARNi: Lisinopril 20 mg  Sodium restriction 2g  CMP, magnesium tomorrow a.m; Goal Mg > 2 and K > 4; Replete prn  HOB > 30°, Daily standing weights, Measure I/O  Consult nutrition for dietary education  Cards consulted  Echo ordered - pending

## 2024-10-26 NOTE — ASSESSMENT & PLAN NOTE
Presents to ED c/o worsening SOB and chest pain/pressure.  Patient poor historian, most of history obtained from daughter  Per chart review, recently seen by PCP 10/22 patient complaining of SOB and leg swelling with reported O2 sat 81-90%.  Patient was instructed to increase home Lasix to twice daily x 3 days and report to ED if O2 dropped below 90  Daughter reports patient has poor medication adherence due to dementia.  He lives at home with girlfriend who does not help him with his medication, daughter says she helps him as much as she can  CXR with possible pulmonary venous congestion although pending final read  BNP 2374, troponin positive x 2, EKG - known A Fib  Patient hypervolemic on exam as evidenced by bilateral lower extremity pitting edema  S/p 40 mg IV Lasix in ED.  50 mg IV Lasix twice daily ordered  Most recent echo Aug 2023 LVEF 50%; repeat echo ordered  Monitor I's/O's and daily weights  Fluid restriction and low-sodium diet ordered  Cardiology consulted, appreciate their recommendations  Repeat EKG if patient with active chest pain  Wt Readings from Last 3 Encounters:   10/25/24 91.8 kg (202 lb 6.1 oz)   10/22/24 95.6 kg (210 lb 12.8 oz)   09/03/24 90.2 kg (198 lb 12.8 oz)

## 2024-10-27 LAB
ANION GAP SERPL CALCULATED.3IONS-SCNC: 8 MMOL/L (ref 4–13)
BUN SERPL-MCNC: 47 MG/DL (ref 5–25)
CALCIUM SERPL-MCNC: 9.3 MG/DL (ref 8.4–10.2)
CHLORIDE SERPL-SCNC: 100 MMOL/L (ref 96–108)
CO2 SERPL-SCNC: 28 MMOL/L (ref 21–32)
CREAT SERPL-MCNC: 2 MG/DL (ref 0.6–1.3)
ERYTHROCYTE [DISTWIDTH] IN BLOOD BY AUTOMATED COUNT: 14.8 % (ref 11.6–15.1)
GFR SERPL CREATININE-BSD FRML MDRD: 29 ML/MIN/1.73SQ M
GLUCOSE SERPL-MCNC: 118 MG/DL (ref 65–140)
GLUCOSE SERPL-MCNC: 127 MG/DL (ref 65–140)
GLUCOSE SERPL-MCNC: 151 MG/DL (ref 65–140)
GLUCOSE SERPL-MCNC: 160 MG/DL (ref 65–140)
GLUCOSE SERPL-MCNC: 183 MG/DL (ref 65–140)
HCT VFR BLD AUTO: 34.7 % (ref 36.5–49.3)
HGB BLD-MCNC: 10.7 G/DL (ref 12–17)
MAGNESIUM SERPL-MCNC: 2 MG/DL (ref 1.9–2.7)
MCH RBC QN AUTO: 30.6 PG (ref 26.8–34.3)
MCHC RBC AUTO-ENTMCNC: 30.8 G/DL (ref 31.4–37.4)
MCV RBC AUTO: 99 FL (ref 82–98)
PLATELET # BLD AUTO: 161 THOUSANDS/UL (ref 149–390)
PMV BLD AUTO: 10.2 FL (ref 8.9–12.7)
POTASSIUM SERPL-SCNC: 4.5 MMOL/L (ref 3.5–5.3)
RBC # BLD AUTO: 3.5 MILLION/UL (ref 3.88–5.62)
SODIUM SERPL-SCNC: 136 MMOL/L (ref 135–147)
WBC # BLD AUTO: 6.65 THOUSAND/UL (ref 4.31–10.16)

## 2024-10-27 PROCEDURE — 85027 COMPLETE CBC AUTOMATED: CPT | Performed by: INTERNAL MEDICINE

## 2024-10-27 PROCEDURE — 99232 SBSQ HOSP IP/OBS MODERATE 35: CPT | Performed by: INTERNAL MEDICINE

## 2024-10-27 PROCEDURE — 80048 BASIC METABOLIC PNL TOTAL CA: CPT | Performed by: INTERNAL MEDICINE

## 2024-10-27 PROCEDURE — 82948 REAGENT STRIP/BLOOD GLUCOSE: CPT

## 2024-10-27 PROCEDURE — 83735 ASSAY OF MAGNESIUM: CPT | Performed by: INTERNAL MEDICINE

## 2024-10-27 RX ORDER — HYDRALAZINE HYDROCHLORIDE 20 MG/ML
10 INJECTION INTRAMUSCULAR; INTRAVENOUS EVERY 6 HOURS PRN
Status: DISCONTINUED | OUTPATIENT
Start: 2024-10-27 | End: 2024-10-30 | Stop reason: HOSPADM

## 2024-10-27 RX ORDER — LABETALOL HYDROCHLORIDE 5 MG/ML
10 INJECTION, SOLUTION INTRAVENOUS EVERY 6 HOURS PRN
Status: DISCONTINUED | OUTPATIENT
Start: 2024-10-27 | End: 2024-10-30 | Stop reason: HOSPADM

## 2024-10-27 RX ADMIN — FINASTERIDE 5 MG: 5 TABLET, FILM COATED ORAL at 08:56

## 2024-10-27 RX ADMIN — FUROSEMIDE 60 MG: 10 INJECTION, SOLUTION INTRAMUSCULAR; INTRAVENOUS at 08:56

## 2024-10-27 RX ADMIN — INSULIN LISPRO 1 UNITS: 100 INJECTION, SOLUTION INTRAVENOUS; SUBCUTANEOUS at 11:01

## 2024-10-27 RX ADMIN — METOPROLOL TARTRATE 25 MG: 25 TABLET, FILM COATED ORAL at 08:56

## 2024-10-27 RX ADMIN — LISINOPRIL 20 MG: 20 TABLET ORAL at 08:56

## 2024-10-27 RX ADMIN — ISOSORBIDE DINITRATE 10 MG: 10 TABLET ORAL at 17:22

## 2024-10-27 RX ADMIN — ISOSORBIDE DINITRATE 10 MG: 10 TABLET ORAL at 08:56

## 2024-10-27 RX ADMIN — APIXABAN 2.5 MG: 2.5 TABLET, FILM COATED ORAL at 17:22

## 2024-10-27 RX ADMIN — FUROSEMIDE 60 MG: 10 INJECTION, SOLUTION INTRAMUSCULAR; INTRAVENOUS at 17:22

## 2024-10-27 RX ADMIN — APIXABAN 2.5 MG: 2.5 TABLET, FILM COATED ORAL at 08:56

## 2024-10-27 RX ADMIN — ASPIRIN 81 MG: 81 TABLET, COATED ORAL at 08:56

## 2024-10-27 RX ADMIN — INSULIN LISPRO 1 UNITS: 100 INJECTION, SOLUTION INTRAVENOUS; SUBCUTANEOUS at 17:22

## 2024-10-27 RX ADMIN — AMLODIPINE BESYLATE 10 MG: 10 TABLET ORAL at 08:56

## 2024-10-27 RX ADMIN — ACETAMINOPHEN 650 MG: 325 TABLET ORAL at 21:03

## 2024-10-27 NOTE — NURSING NOTE
Patient continues to jump out of bed without alerting staff members for assistance. Patient again reminded of safety, fall risk and needing assistance when getting out of bed. Bed alarm placed on higher sensitivity and Nurse notified.

## 2024-10-27 NOTE — PLAN OF CARE
Problem: PAIN - ADULT  Goal: Verbalizes/displays adequate comfort level or baseline comfort level  Description: Interventions:  - Encourage patient to monitor pain and request assistance  - Assess pain using appropriate pain scale  - Administer analgesics based on type and severity of pain and evaluate response  - Implement non-pharmacological measures as appropriate and evaluate response  - Consider cultural and social influences on pain and pain management  - Notify physician/advanced practitioner if interventions unsuccessful or patient reports new pain  Outcome: Progressing     Problem: INFECTION - ADULT  Goal: Absence or prevention of progression during hospitalization  Description: INTERVENTIONS:  - Assess and monitor for signs and symptoms of infection  - Monitor lab/diagnostic results  - Monitor all insertion sites, i.e. indwelling lines, tubes, and drains  - Monitor endotracheal if appropriate and nasal secretions for changes in amount and color  - [place] appropriate cooling/warming therapies per order  - Administer medications as ordered  - Instruct and encourage patient and family to use good hand hygiene technique  - Identify and instruct in appropriate isolation precautions for identified infection/condition  Outcome: Progressing  Goal: Absence of fever/infection during neutropenic period  Description: INTERVENTIONS:  - Monitor WBC    Outcome: Progressing     Problem: SAFETY ADULT  Goal: Patient will remain free of falls  Description: INTERVENTIONS:  - Educate patient/family on patient safety including physical limitations  - Instruct patient to call for assistance with activity   - Consult OT/PT to assist with strengthening/mobility   - Keep Call bell within reach  - Keep bed low and locked with side rails adjusted as appropriate  - Keep care items and personal belongings within reach  - Initiate and maintain comfort rounds  - Make Fall Risk Sign visible to staff  - Offer Toileting every 2 Hours,  in advance of need  - Initiate/Maintain bed alarm  - Obtain necessary fall risk management equipment: chair alarm  - Apply yellow socks and bracelet for high fall risk patients  - Consider moving patient to room near nurses station  Outcome: Progressing  Goal: Maintain or return to baseline ADL function  Description: INTERVENTIONS:  -  Assess patient's ability to carry out ADLs; assess patient's baseline for ADL function and identify physical deficits which impact ability to perform ADLs (bathing, care of mouth/teeth, toileting, grooming, dressing, etc.)  - Assess/evaluate cause of self-care deficits   - Assess range of motion  - Assess patient's mobility; develop plan if impaired  - Assess patient's need for assistive devices and provide as appropriate  - Encourage maximum independence but intervene and supervise when necessary  - Involve family in performance of ADLs  - Assess for home care needs following discharge   - Consider OT consult to assist with ADL evaluation and planning for discharge  - Provide patient education as appropriate  Outcome: Progressing  Goal: Maintains/Returns to pre admission functional level  Description: INTERVENTIONS:  - Perform AM-PAC 6 Click Basic Mobility/ Daily Activity assessment daily.  - Set and communicate daily mobility goal to care team and patient/family/caregiver.   - Collaborate with rehabilitation services on mobility goals if consulted  - Perform Range of Motion 3 times a day.  - Reposition patient every 3 hours.  - Dangle patient 3 times a day  - Stand patient 3 times a day  - Ambulate patient 3 times a day  - Out of bed to chair 3 times a day   - Out of bed for meals 3 times a day  - Out of bed for toileting  - Record patient progress and toleration of activity level   Outcome: Progressing     Problem: DISCHARGE PLANNING  Goal: Discharge to home or other facility with appropriate resources  Description: INTERVENTIONS:  - Identify barriers to discharge w/patient and  caregiver  - Arrange for needed discharge resources and transportation as appropriate  - Identify discharge learning needs (meds, wound care, etc.)  - Arrange for interpretive services to assist at discharge as needed  - Refer to Case Management Department for coordinating discharge planning if the patient needs post-hospital services based on physician/advanced practitioner order or complex needs related to functional status, cognitive ability, or social support system  Outcome: Progressing     Problem: Knowledge Deficit  Goal: Patient/family/caregiver demonstrates understanding of disease process, treatment plan, medications, and discharge instructions  Description: Complete learning assessment and assess knowledge base.  Interventions:  - Provide teaching at level of understanding  - Provide teaching via preferred learning methods  Outcome: Progressing     Problem: CARDIOVASCULAR - ADULT  Goal: Maintains optimal cardiac output and hemodynamic stability  Description: INTERVENTIONS:  - Monitor I/O, vital signs and rhythm  - Monitor for S/S and trends of decreased cardiac output  - Administer and titrate ordered vasoactive medications to optimize hemodynamic stability  - Assess quality of pulses, skin color and temperature  - Assess for signs of decreased coronary artery perfusion  - Instruct patient to report change in severity of symptoms  Outcome: Progressing  Goal: Absence of cardiac dysrhythmias or at baseline rhythm  Description: INTERVENTIONS:  - Continuous cardiac monitoring, vital signs, obtain 12 lead EKG if ordered  - Administer antiarrhythmic and heart rate control medications as ordered  - Monitor electrolytes and administer replacement therapy as ordered  Outcome: Progressing     Problem: RESPIRATORY - ADULT  Goal: Achieves optimal ventilation and oxygenation  Description: INTERVENTIONS:  - Assess for changes in respiratory status  - Assess for changes in mentation and behavior  - Position to  facilitate oxygenation and minimize respiratory effort  - Oxygen administered by appropriate delivery if ordered  - Initiate smoking cessation education as indicated  - Encourage broncho-pulmonary hygiene including cough, deep breathe, Incentive Spirometry  - Assess the need for suctioning and aspirate as needed  - Assess and instruct to report SOB or any respiratory difficulty  - Respiratory Therapy support as indicated  Outcome: Progressing     Problem: MUSCULOSKELETAL - ADULT  Goal: Maintain or return mobility to safest level of function  Description: INTERVENTIONS:  - Assess patient's ability to carry out ADLs; assess patient's baseline for ADL function and identify physical deficits which impact ability to perform ADLs (bathing, care of mouth/teeth, toileting, grooming, dressing, etc.)  - Assess/evaluate cause of self-care deficits   - Assess range of motion  - Assess patient's mobility  - Assess patient's need for assistive devices and provide as appropriate  - Encourage maximum independence but intervene and supervise when necessary  - Involve family in performance of ADLs  - Assess for home care needs following discharge   - Consider OT consult to assist with ADL evaluation and planning for discharge  - Provide patient education as appropriate  Outcome: Progressing  Goal: Maintain proper alignment of affected body part  Description: INTERVENTIONS:  - Support, maintain and protect limb and body alignment  - Provide patient/ family with appropriate education  Outcome: Progressing

## 2024-10-27 NOTE — ASSESSMENT & PLAN NOTE
Lab Results   Component Value Date    HGBA1C 7.1 (H) 06/11/2024     Recent Labs     10/25/24  1932 10/25/24  2208 10/26/24  0534 10/26/24  0652 10/26/24  2047 10/27/24  0455 10/27/24  0619 10/27/24  1035   POCGLU  --    < >  --    < > 125  --  118 183*   GLUC 202*  --  148*  --   --  127  --   --     < > = values in this interval not displayed.     Hold home regimen while inpatient and resume on discharge  Diabetic diet  Insulin regimen  Glucose checks and Insulin correction ACHS  Goal -180 while admitted, adjusting insulin regimen as appropriate  Monitor for hypoglycemia and treat per protocol

## 2024-10-27 NOTE — ASSESSMENT & PLAN NOTE
Blood Pressure: (!) 140/112    Plan:  Continue home meds  Monitor blood pressure  PRN IV Labetalol and Hydralazine for SBP > 160

## 2024-10-27 NOTE — QUICK NOTE
When patient admitted noted to be experiencing chest pain, shortness of breath.  Given patient's Alzheimer's the option of medical management versus ischemic evaluation was discussed with his daughter Chanel.  She agreed to this plan and thought it was appropriate.  She also asked to have case management contact her for looking into skilled nursing facility placement for her father as she feels he is not getting the care he needs at home.  Internal medicine notified of this.

## 2024-10-27 NOTE — PROGRESS NOTES
Progress Note - Hospitalist   Name: Tom Stewart 88 y.o. male I MRN: [id number]  Unit/Bed#: -01 I Date of Admission: 10/25/2024   Date of Service: 10/27/2024 I Hospital Day: 2    Assessment & Plan  Acute on chronic heart failure with preserved ejection fraction (HCC)  Presents to ED c/o worsening SOB and chest pain/pressure.  Patient poor historian, most of history obtained from daughter  Per chart review, recently seen by PCP 10/22 patient complaining of SOB and leg swelling with reported O2 sat 81-90%  Patient was instructed to increase home Lasix to twice daily x 3 days and report to ED if O2 dropped below 90  Daughter reports patient has poor medication adherence due to dementia.  He lives at home with girlfriend who does not help him with his medication, daughter says she helps him as much as she can    Lab Results   Component Value Date    LVEF 50 08/15/2023    BNP 2,374 (H) 10/25/2024    BNP 2,419 (H) 10/22/2024     Presents with increased shortness of breath  CXR with possible pulmonary venous congestion, pending final read  NYHA Class III., Stage C  Patient is currently volume overloaded.- bilateral lower extremity pitting edema    Plan:  GDMT:  Diuretic: Lasix 60 mg IV BID, B-Blocker: Lopressor 25 mg BID, ACE/ARB/ARNi: Lisinopril 20 mg  Sodium restriction 2g  CMP, magnesium tomorrow a.m; Goal Mg > 2 and K > 4; Replete prn  HOB > 30°, Daily standing weights, Measure I/O  Consult nutrition for dietary education  Cards consulted  Echo ordered - pending    Acute respiratory failure with hypoxia (HCC)  Currently SpO2: 97 % on Nasal Cannula O2 Flow Rate (L/min): 2 L/min   On room air at home, 85% on arrival  Wean oxygen as appropriate  Coronary artery disease involving native coronary artery of native heart without angina pectoris  Hx of CAD s/p PCI Jan 2020  C/o chest pain, EKG without ischemia. Suspected d/t CHF exacerbation  Continue pta ASA and eliquis  Paroxysmal atrial fibrillation  (HCC)  Stable  Continue pta eliquis and lopressor  Type 2 diabetes mellitus with stage 4 chronic kidney disease, without long-term current use of insulin (HCC)  Lab Results   Component Value Date    HGBA1C 7.1 (H) 06/11/2024     Recent Labs     10/25/24  1932 10/25/24  2208 10/26/24  0534 10/26/24  0652 10/26/24  2047 10/27/24  0455 10/27/24  0619 10/27/24  1035   POCGLU  --    < >  --    < > 125  --  118 183*   GLUC 202*  --  148*  --   --  127  --   --     < > = values in this interval not displayed.     Hold home regimen while inpatient and resume on discharge  Diabetic diet  Insulin regimen  Glucose checks and Insulin correction ACHS  Goal -180 while admitted, adjusting insulin regimen as appropriate  Monitor for hypoglycemia and treat per protocol    Stage 4 chronic kidney disease (HCC)  Cr 1.94 within baseline  Continue to monitor Cr, avoid nephrotoxic agents  Lab Results   Component Value Date    EGFR 29 10/27/2024    EGFR 32 10/26/2024    EGFR 30 10/25/2024    CREATININE 2.00 (H) 10/27/2024    CREATININE 1.85 (H) 10/26/2024    CREATININE 1.94 (H) 10/25/2024   Monitor renal function  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 (HCC)  Blood Pressure: (!) 140/112   BP stable since admission  Continue pta amlodipine and lisinopril  BP PRN meds IV in place for BP > 160    Other Alzheimer's disease (HCC)  Pt w/ known hx of dementia, currently at baseline A&O x2  Is not on medication  Fall precautions, delirium precautions in place  PT/OT consulted  Mixed hyperlipidemia due to type 2 diabetes mellitus  (HCC)  Lab Results   Component Value Date    CHOLESTEROL 199 06/11/2024    TRIG 138 06/11/2024    HDL 46 06/11/2024    LDLCALC 125 (H) 06/11/2024   Hold off statin for now      Blood Sugar Average: Last 72 hrs:  (P) [id number]    History of CVA (cerebrovascular accident)  Known hx; superimposed on dementia  Primary hypertension  Blood Pressure: (!) 140/112    Plan:  Continue  home meds  Monitor blood pressure  PRN IV Labetalol and Hydralazine for SBP > 160    Ambulatory dysfunction  PT/OT Eval and treat  Trend Mobility Scores  Encourage appropriate mobility to achieve and improve upon HLM Goals as noted  Plan for placement to rehab on discharge    VTE Pharmacologic Prophylaxis: VTE Score: 7 High Risk (Score >/= 5) - Pharmacological DVT Prophylaxis Ordered: enoxaparin (Lovenox). Sequential Compression Devices Ordered.    Mobility:   Basic Mobility Inpatient Raw Score: 13  -HLM Goal: 4: Move to chair/commode  JH-HLM Achieved: 5: Stand (1 or more minutes)  JH-HLM Goal NOT achieved. Continue with multidisciplinary rounding and encourage appropriate mobility to improve upon -HLM goals.    Patient Centered Rounds: I performed bedside rounds with nursing staff today.   Discussions with Specialists or Other Care Team Provider: cardiology    Education and Discussions with Family / Patient: Updated  (daughter) via phone.    Current Length of Stay: 2 day(s)  Current Patient Status: Inpatient   Certification Statement: The patient will continue to require additional inpatient hospital stay due to plan above  Discharge Plan: Anticipate discharge in 24-48 hrs to rehab facility.    Code Status: Level 3 - DNAR and DNI    Subjective   After extensive discussion with daughter over the phone with provided update, it was made clear that patient's girlfriend who is typically at bedside is not to be receiving any medical updates.  POA is daughter and son.  Ultimate plan is for discharge to rehab per the request of POA.  Additionally, updates are not to be given to significant other at bedside and primarily to be given to POA for daughter and son.    Objective :  Temp:  [97.5 °F (36.4 °C)-98.2 °F (36.8 °C)] 97.5 °F (36.4 °C)  HR:  [52-70] 52  BP: (127-151)/() 140/112  Resp:  [17] 17  SpO2:  [88 %-97 %] 97 %  O2 Device: Nasal cannula  Nasal Cannula O2 Flow Rate (L/min):  [2 L/min] 2  L/min    Body mass index is 28.75 kg/m².     Input and Output Summary (last 24 hours):     Intake/Output Summary (Last 24 hours) at 10/27/2024 1359  Last data filed at 10/27/2024 0616  Gross per 24 hour   Intake 240 ml   Output 1640 ml   Net -1400 ml       Physical Exam  Vitals and nursing note reviewed.   Constitutional:       General: He is not in acute distress.     Appearance: He is well-developed. He is ill-appearing (Chronically). He is not diaphoretic.      Interventions: Nasal cannula in place.   HENT:      Head: Normocephalic and atraumatic.      Nose: Nose normal.   Eyes:      General: No scleral icterus.     Conjunctiva/sclera: Conjunctivae normal.      Pupils: Pupils are equal, round, and reactive to light.   Neck:      Thyroid: No thyromegaly.   Cardiovascular:      Rate and Rhythm: Normal rate and regular rhythm.      Heart sounds: Normal heart sounds. No murmur heard.  Pulmonary:      Effort: Pulmonary effort is normal.      Breath sounds: Rales present. No wheezing or rhonchi.   Abdominal:      General: Bowel sounds are normal. There is no distension.      Palpations: Abdomen is soft.      Tenderness: There is no abdominal tenderness.   Musculoskeletal:         General: No swelling, tenderness or deformity.      Cervical back: Neck supple.   Skin:     General: Skin is warm and dry.   Neurological:      Mental Status: He is alert and oriented to person, place, and time. Mental status is at baseline.   Psychiatric:         Behavior: Behavior normal. Behavior is cooperative.         Thought Content: Thought content normal.         Judgment: Judgment normal.           Lines/Drains:        Telemetry:  Telemetry Orders (From admission, onward)               24 Hour Telemetry Monitoring  Continuous x 24 Hours (Telem)        Question:  Reason for 24 Hour Telemetry  Answer:  PCI/EP study (including pacer and ICD implementation), Cardiac surgery, MI, abnormal cardiac cath, and chest pain- rule out MI                      Telemetry Reviewed:   Indication for Continued Telemetry Use: No indication for continued use. Will discontinue.                Lab Results: I have reviewed the following results:   Results from last 7 days   Lab Units 10/27/24  0455 10/26/24  0534 10/25/24  1932   WBC Thousand/uL 6.65   < > 5.84   HEMOGLOBIN g/dL 10.7*   < > 10.7*   HEMATOCRIT % 34.7*   < > 34.0*   PLATELETS Thousands/uL 161   < > 160   SEGS PCT %  --   --  80*   LYMPHO PCT %  --   --  10*   MONO PCT %  --   --  7   EOS PCT %  --   --  2    < > = values in this interval not displayed.     Results from last 7 days   Lab Units 10/27/24  0455 10/26/24  0534   SODIUM mmol/L 136 135   POTASSIUM mmol/L 4.5 4.1   CHLORIDE mmol/L 100 99   CO2 mmol/L 28 28   BUN mg/dL 47* 37*   CREATININE mg/dL 2.00* 1.85*   ANION GAP mmol/L 8 8   CALCIUM mg/dL 9.3 9.4   ALBUMIN g/dL  --  4.2   TOTAL BILIRUBIN mg/dL  --  0.90   ALK PHOS U/L  --  47   ALT U/L  --  23   AST U/L  --  19   GLUCOSE RANDOM mg/dL 127 148*         Results from last 7 days   Lab Units 10/27/24  1035 10/27/24  0619 10/26/24  2047 10/26/24  1549 10/26/24  1135 10/26/24  0652 10/25/24  2208   POC GLUCOSE mg/dl 183* 118 125 206* 187* 144* 166*               Recent Cultures (last 7 days):         Imaging Results Review: No pertinent imaging studies reviewed.  Other Study Results Review: No additional pertinent studies reviewed.    Last 24 Hours Medication List:     Current Facility-Administered Medications:     acetaminophen (TYLENOL) tablet 650 mg, Q4H PRN    amLODIPine (NORVASC) tablet 10 mg, Daily    apixaban (ELIQUIS) tablet 2.5 mg, BID    aspirin (ECOTRIN LOW STRENGTH) EC tablet 81 mg, Daily    finasteride (PROSCAR) tablet 5 mg, Daily    furosemide (LASIX) injection 60 mg, BID (diuretic)    hydrALAZINE (APRESOLINE) injection 10 mg, Q6H PRN    insulin lispro (HumALOG/ADMELOG) 100 units/mL subcutaneous injection 1-6 Units, TID AC **AND** Fingerstick Glucose (POCT), TID AC    isosorbide  dinitrate (ISORDIL) tablet 10 mg, BID    labetalol (NORMODYNE) injection 10 mg, Q6H PRN    lidocaine (LIDODERM) 5 % patch 1 patch, Daily    lisinopril (ZESTRIL) tablet 20 mg, Daily    metoprolol tartrate (LOPRESSOR) tablet 25 mg, Daily    Insert peripheral IV, Once **AND** sodium chloride (PF) 0.9 % injection 3 mL, Q1H PRN    Administrative Statements   Today, Patient Was Seen By: Jake Bell DO      **Please Note: This note may have been constructed using a voice recognition system.**

## 2024-10-27 NOTE — PLAN OF CARE
Problem: SAFETY ADULT  Goal: Patient will remain free of falls  Description: INTERVENTIONS:  - Educate patient/family on patient safety including physical limitations  - Instruct patient to call for assistance with activity   - Consult OT/PT to assist with strengthening/mobility   - Keep Call bell within reach  - Keep bed low and locked with side rails adjusted as appropriate  - Keep care items and personal belongings within reach  - Initiate and maintain comfort rounds  - Make Fall Risk Sign visible to staff  - Offer Toileting every 2 Hours, in advance of need  - Initiate/Maintain bed alarm  - Obtain necessary fall risk management equipment: yellow socks  - Apply yellow socks and bracelet for high fall risk patients  - Consider moving patient to room near nurses station  Outcome: Progressing  Goal: Maintain or return to baseline ADL function  Description: INTERVENTIONS:  -  Assess patient's ability to carry out ADLs; assess patient's baseline for ADL function and identify physical deficits which impact ability to perform ADLs (bathing, care of mouth/teeth, toileting, grooming, dressing, etc.)  - Assess/evaluate cause of self-care deficits   - Assess range of motion  - Assess patient's mobility; develop plan if impaired  - Assess patient's need for assistive devices and provide as appropriate  - Encourage maximum independence but intervene and supervise when necessary  - Involve family in performance of ADLs  - Assess for home care needs following discharge   - Consider OT consult to assist with ADL evaluation and planning for discharge  - Provide patient education as appropriate  Outcome: Progressing  Goal: Maintains/Returns to pre admission functional level  Description: INTERVENTIONS:  - Perform AM-PAC 6 Click Basic Mobility/ Daily Activity assessment daily.  - Set and communicate daily mobility goal to care team and patient/family/caregiver.   - Collaborate with rehabilitation services on mobility goals if  consulted  - Perform Range of Motion 2 times a day.  - Reposition patient every 2 hours.  - Dangle patient 2 times a day  - Stand patient 2 times a day  - Ambulate patient 2 times a day  - Out of bed to chair 2 times a day   - Out of bed for meals 2 times a day  - Out of bed for toileting  - Record patient progress and toleration of activity level   Outcome: Progressing     Problem: CARDIOVASCULAR - ADULT  Goal: Maintains optimal cardiac output and hemodynamic stability  Description: INTERVENTIONS:  - Monitor I/O, vital signs and rhythm  - Monitor for S/S and trends of decreased cardiac output  - Administer and titrate ordered vasoactive medications to optimize hemodynamic stability  - Assess quality of pulses, skin color and temperature  - Assess for signs of decreased coronary artery perfusion  - Instruct patient to report change in severity of symptoms  Outcome: Progressing  Goal: Absence of cardiac dysrhythmias or at baseline rhythm  Description: INTERVENTIONS:  - Continuous cardiac monitoring, vital signs, obtain 12 lead EKG if ordered  - Administer antiarrhythmic and heart rate control medications as ordered  - Monitor electrolytes and administer replacement therapy as ordered  Outcome: Progressing     Problem: RESPIRATORY - ADULT  Goal: Achieves optimal ventilation and oxygenation  Description: INTERVENTIONS:  - Assess for changes in respiratory status  - Assess for changes in mentation and behavior  - Position to facilitate oxygenation and minimize respiratory effort  - Oxygen administered by appropriate delivery if ordered  - Initiate smoking cessation education as indicated  - Encourage broncho-pulmonary hygiene including cough, deep breathe, Incentive Spirometry  - Assess the need for suctioning and aspirate as needed  - Assess and instruct to report SOB or any respiratory difficulty  - Respiratory Therapy support as indicated  Outcome: Progressing

## 2024-10-27 NOTE — ASSESSMENT & PLAN NOTE
Lab Results   Component Value Date    CHOLESTEROL 199 06/11/2024    TRIG 138 06/11/2024    HDL 46 06/11/2024    LDLCALC 125 (H) 06/11/2024   Hold off statin for now      Blood Sugar Average: Last 72 hrs:  (P) [id number]

## 2024-10-27 NOTE — ASSESSMENT & PLAN NOTE
Cr 1.94 within baseline  Continue to monitor Cr, avoid nephrotoxic agents  Lab Results   Component Value Date    EGFR 29 10/27/2024    EGFR 32 10/26/2024    EGFR 30 10/25/2024    CREATININE 2.00 (H) 10/27/2024    CREATININE 1.85 (H) 10/26/2024    CREATININE 1.94 (H) 10/25/2024   Monitor renal function

## 2024-10-27 NOTE — PLAN OF CARE
Problem: PAIN - ADULT  Goal: Verbalizes/displays adequate comfort level or baseline comfort level  Description: Interventions:  - Encourage patient to monitor pain and request assistance  - Assess pain using appropriate pain scale  - Administer analgesics based on type and severity of pain and evaluate response  - Implement non-pharmacological measures as appropriate and evaluate response  - Consider cultural and social influences on pain and pain management  - Notify physician/advanced practitioner if interventions unsuccessful or patient reports new pain  Outcome: Progressing     Problem: INFECTION - ADULT  Goal: Absence or prevention of progression during hospitalization  Description: INTERVENTIONS:  - Assess and monitor for signs and symptoms of infection  - Monitor lab/diagnostic results  - Monitor all insertion sites, i.e. indwelling lines, tubes, and drains  - Monitor endotracheal if appropriate and nasal secretions for changes in amount and color  - Sage appropriate cooling/warming therapies per order  - Administer medications as ordered  - Instruct and encourage patient and family to use good hand hygiene technique  - Identify and instruct in appropriate isolation precautions for identified infection/condition  Outcome: Progressing  Goal: Absence of fever/infection during neutropenic period  Description: INTERVENTIONS:  - Monitor WBC    Outcome: Progressing     Problem: SAFETY ADULT  Goal: Patient will remain free of falls  Description: INTERVENTIONS:  - Educate patient/family on patient safety including physical limitations  - Instruct patient to call for assistance with activity   - Consult OT/PT to assist with strengthening/mobility   - Keep Call bell within reach  - Keep bed low and locked with side rails adjusted as appropriate  - Keep care items and personal belongings within reach  - Initiate and maintain comfort rounds  - Make Fall Risk Sign visible to staff  - Offer Toileting every 2 Hours,  in advance of need  - Initiate/Maintain bed alarm  - Obtain necessary fall risk management equipment: walker  - Apply yellow socks and bracelet for high fall risk patients  - Consider moving patient to room near nurses station  Outcome: Progressing  Goal: Maintain or return to baseline ADL function  Description: INTERVENTIONS:  -  Assess patient's ability to carry out ADLs; assess patient's baseline for ADL function and identify physical deficits which impact ability to perform ADLs (bathing, care of mouth/teeth, toileting, grooming, dressing, etc.)  - Assess/evaluate cause of self-care deficits   - Assess range of motion  - Assess patient's mobility; develop plan if impaired  - Assess patient's need for assistive devices and provide as appropriate  - Encourage maximum independence but intervene and supervise when necessary  - Involve family in performance of ADLs  - Assess for home care needs following discharge   - Consider OT consult to assist with ADL evaluation and planning for discharge  - Provide patient education as appropriate  Outcome: Progressing  Goal: Maintains/Returns to pre admission functional level  Description: INTERVENTIONS:  - Perform AM-PAC 6 Click Basic Mobility/ Daily Activity assessment daily.  - Set and communicate daily mobility goal to care team and patient/family/caregiver.   - Collaborate with rehabilitation services on mobility goals if consulted  - Perform Range of Motion 2 times a day.  - Reposition patient every 2 hours.  - Dangle patient 2 times a day  - Stand patient 2 times a day  - Ambulate patient 2 times a day  - Out of bed to chair 2 times a day   - Out of bed for meals 3 times a day  - Out of bed for toileting  - Record patient progress and toleration of activity level   Outcome: Progressing

## 2024-10-27 NOTE — ASSESSMENT & PLAN NOTE
PT/OT Eval and treat  Trend Mobility Scores  Encourage appropriate mobility to achieve and improve upon HLM Goals as noted  Plan for placement to rehab on discharge

## 2024-10-27 NOTE — ASSESSMENT & PLAN NOTE
Blood Pressure: (!) 140/112   BP stable since admission  Continue pta amlodipine and lisinopril  BP PRN meds IV in place for BP > 160

## 2024-10-27 NOTE — NURSING NOTE
Patient on multiple occasions is jumping out of bed to urinate. Patient reminded each time to use call bell for assistance. Bed alarm set, fall risk socks, and fall risk band placed on patient. Nurse notified.

## 2024-10-27 NOTE — ASSESSMENT & PLAN NOTE
Currently SpO2: 97 % on Nasal Cannula O2 Flow Rate (L/min): 2 L/min   On room air at home, 85% on arrival  Wean oxygen as appropriate

## 2024-10-28 ENCOUNTER — APPOINTMENT (INPATIENT)
Dept: NON INVASIVE DIAGNOSTICS | Facility: HOSPITAL | Age: 89
DRG: 291 | End: 2024-10-28
Payer: MEDICARE

## 2024-10-28 PROBLEM — I35.0 AORTIC STENOSIS: Status: ACTIVE | Noted: 2024-10-28

## 2024-10-28 PROBLEM — R54 FRAILTY SYNDROME IN GERIATRIC PATIENT: Status: ACTIVE | Noted: 2024-10-28

## 2024-10-28 PROBLEM — Z13.39 ENCOUNTER FOR DELIRIUM ELDERLY AT RISK (DEAR) SCREENING: Status: ACTIVE | Noted: 2024-10-28

## 2024-10-28 LAB
ANION GAP SERPL CALCULATED.3IONS-SCNC: 8 MMOL/L (ref 4–13)
BUN SERPL-MCNC: 47 MG/DL (ref 5–25)
CALCIUM SERPL-MCNC: 9.2 MG/DL (ref 8.4–10.2)
CHLORIDE SERPL-SCNC: 97 MMOL/L (ref 96–108)
CO2 SERPL-SCNC: 30 MMOL/L (ref 21–32)
CREAT SERPL-MCNC: 1.99 MG/DL (ref 0.6–1.3)
ERYTHROCYTE [DISTWIDTH] IN BLOOD BY AUTOMATED COUNT: 14.9 % (ref 11.6–15.1)
GFR SERPL CREATININE-BSD FRML MDRD: 29 ML/MIN/1.73SQ M
GLUCOSE SERPL-MCNC: 124 MG/DL (ref 65–140)
GLUCOSE SERPL-MCNC: 132 MG/DL (ref 65–140)
GLUCOSE SERPL-MCNC: 149 MG/DL (ref 65–140)
GLUCOSE SERPL-MCNC: 149 MG/DL (ref 65–140)
GLUCOSE SERPL-MCNC: 209 MG/DL (ref 65–140)
HCT VFR BLD AUTO: 35.2 % (ref 36.5–49.3)
HGB BLD-MCNC: 11.1 G/DL (ref 12–17)
MAGNESIUM SERPL-MCNC: 2 MG/DL (ref 1.9–2.7)
MCH RBC QN AUTO: 30.7 PG (ref 26.8–34.3)
MCHC RBC AUTO-ENTMCNC: 31.5 G/DL (ref 31.4–37.4)
MCV RBC AUTO: 97 FL (ref 82–98)
PLATELET # BLD AUTO: 172 THOUSANDS/UL (ref 149–390)
PMV BLD AUTO: 10.3 FL (ref 8.9–12.7)
POTASSIUM SERPL-SCNC: 3.9 MMOL/L (ref 3.5–5.3)
RBC # BLD AUTO: 3.62 MILLION/UL (ref 3.88–5.62)
SODIUM SERPL-SCNC: 135 MMOL/L (ref 135–147)
WBC # BLD AUTO: 6.95 THOUSAND/UL (ref 4.31–10.16)

## 2024-10-28 PROCEDURE — 99285 EMERGENCY DEPT VISIT HI MDM: CPT | Performed by: EMERGENCY MEDICINE

## 2024-10-28 PROCEDURE — 99232 SBSQ HOSP IP/OBS MODERATE 35: CPT | Performed by: INTERNAL MEDICINE

## 2024-10-28 PROCEDURE — 85027 COMPLETE CBC AUTOMATED: CPT | Performed by: INTERNAL MEDICINE

## 2024-10-28 PROCEDURE — 99223 1ST HOSP IP/OBS HIGH 75: CPT

## 2024-10-28 PROCEDURE — 80048 BASIC METABOLIC PNL TOTAL CA: CPT | Performed by: INTERNAL MEDICINE

## 2024-10-28 PROCEDURE — 97163 PT EVAL HIGH COMPLEX 45 MIN: CPT

## 2024-10-28 PROCEDURE — 82948 REAGENT STRIP/BLOOD GLUCOSE: CPT

## 2024-10-28 PROCEDURE — 97110 THERAPEUTIC EXERCISES: CPT

## 2024-10-28 PROCEDURE — 83735 ASSAY OF MAGNESIUM: CPT | Performed by: INTERNAL MEDICINE

## 2024-10-28 RX ADMIN — FINASTERIDE 5 MG: 5 TABLET, FILM COATED ORAL at 09:24

## 2024-10-28 RX ADMIN — METOPROLOL TARTRATE 25 MG: 25 TABLET, FILM COATED ORAL at 09:25

## 2024-10-28 RX ADMIN — FUROSEMIDE 60 MG: 10 INJECTION, SOLUTION INTRAMUSCULAR; INTRAVENOUS at 09:34

## 2024-10-28 RX ADMIN — ASPIRIN 81 MG: 81 TABLET, COATED ORAL at 09:24

## 2024-10-28 RX ADMIN — ISOSORBIDE DINITRATE 10 MG: 10 TABLET ORAL at 09:24

## 2024-10-28 RX ADMIN — ISOSORBIDE DINITRATE 10 MG: 10 TABLET ORAL at 17:29

## 2024-10-28 RX ADMIN — INSULIN LISPRO 2 UNITS: 100 INJECTION, SOLUTION INTRAVENOUS; SUBCUTANEOUS at 11:25

## 2024-10-28 RX ADMIN — LISINOPRIL 20 MG: 20 TABLET ORAL at 09:25

## 2024-10-28 RX ADMIN — AMLODIPINE BESYLATE 10 MG: 10 TABLET ORAL at 09:23

## 2024-10-28 RX ADMIN — APIXABAN 2.5 MG: 2.5 TABLET, FILM COATED ORAL at 17:29

## 2024-10-28 RX ADMIN — FUROSEMIDE 60 MG: 10 INJECTION, SOLUTION INTRAMUSCULAR; INTRAVENOUS at 17:29

## 2024-10-28 RX ADMIN — APIXABAN 2.5 MG: 2.5 TABLET, FILM COATED ORAL at 09:23

## 2024-10-28 NOTE — ASSESSMENT & PLAN NOTE
Patient does have hearing impairment   Does not wear hearing aids, however hearing decreased bilaterally  Hearing impairment  correlated with depression, cognitive impairment, delirium and falls in the older adult  Speak clearly  Use sound amplifier  Speak face to face  Use clear dictation and enunciation of words

## 2024-10-28 NOTE — ASSESSMENT & PLAN NOTE
Wt Readings from Last 3 Encounters:   10/28/24 91.5 kg (201 lb 11.5 oz)   10/22/24 95.6 kg (210 lb 12.8 oz)   09/03/24 90.2 kg (198 lb 12.8 oz)   Nearly euvolemic on exam.  I/O net -2.1 L, creatinine stable.  Continue IV Lasix 60 mg twice daily.  Recommend strict I/O's, daily weights, and sodium restriction.

## 2024-10-28 NOTE — ASSESSMENT & PLAN NOTE
Blood Pressure: 114/65   BP stable since admission  Continue pta amlodipine and lisinopril  BP PRN meds IV in place for BP > 160

## 2024-10-28 NOTE — ASSESSMENT & PLAN NOTE
HR is predominantly well-controlled, continue Lopressor.  [id number] at least 6, continue Eliquis 2.5 mg bid (age, creatinine).

## 2024-10-28 NOTE — ASSESSMENT & PLAN NOTE
Presented with chest pain/SOB.  Patient's daughter wishes to proceed with noninvasive medical management.  Continue ASA, Lopressor and Isordil.

## 2024-10-28 NOTE — ASSESSMENT & PLAN NOTE
Lab Results   Component Value Date    EGFR 29 10/28/2024    EGFR 29 10/27/2024    EGFR 32 10/26/2024    CREATININE 1.99 (H) 10/28/2024    CREATININE 2.00 (H) 10/27/2024    CREATININE 1.85 (H) 10/26/2024     Appears to be at baseline  Avoid nephrotoxic medications  Avoid hypotension

## 2024-10-28 NOTE — ASSESSMENT & PLAN NOTE
Lab Results   Component Value Date    HGBA1C 7.1 (H) 06/11/2024     Recent Labs     10/27/24  1608 10/27/24  2040 10/28/24  0618 10/28/24  1114   POCGLU 160* 151* 124 209*     Blood Sugar Average: Last 72 hrs:  (P) [id number]  Not on statin due to reported history of intolerance.  Management per primary service.

## 2024-10-28 NOTE — PLAN OF CARE
Problem: PAIN - ADULT  Goal: Verbalizes/displays adequate comfort level or baseline comfort level  Description: Interventions:  - Encourage patient to monitor pain and request assistance  - Assess pain using appropriate pain scale  - Administer analgesics based on type and severity of pain and evaluate response  - Implement non-pharmacological measures as appropriate and evaluate response  - Consider cultural and social influences on pain and pain management  - Notify physician/advanced practitioner if interventions unsuccessful or patient reports new pain  Outcome: Progressing     Problem: INFECTION - ADULT  Goal: Absence or prevention of progression during hospitalization  Description: INTERVENTIONS:  - Assess and monitor for signs and symptoms of infection  - Monitor lab/diagnostic results  - Monitor all insertion sites, i.e. indwelling lines, tubes, and drains  - Monitor endotracheal if appropriate and nasal secretions for changes in amount and color  - Lilly appropriate cooling/warming therapies per order  - Administer medications as ordered  - Instruct and encourage patient and family to use good hand hygiene technique  - Identify and instruct in appropriate isolation precautions for identified infection/condition  Outcome: Progressing  Goal: Absence of fever/infection during neutropenic period  Description: INTERVENTIONS:  - Monitor WBC    Outcome: Progressing     Problem: SAFETY ADULT  Goal: Patient will remain free of falls  Description: INTERVENTIONS:  - Educate patient/family on patient safety including physical limitations  - Instruct patient to call for assistance with activity   - Consult OT/PT to assist with strengthening/mobility   - Keep Call bell within reach  - Keep bed low and locked with side rails adjusted as appropriate  - Keep care items and personal belongings within reach  - Initiate and maintain comfort rounds  - Make Fall Risk Sign visible to staff  - Offer Toileting every 2 Hours,  in advance of need  - Initiate/Maintain bed alarm  - Obtain necessary fall risk management equipment: call bell within reach  - Apply yellow socks and bracelet for high fall risk patients  - Consider moving patient to room near nurses station  Outcome: Progressing  Goal: Maintain or return to baseline ADL function  Description: INTERVENTIONS:  -  Assess patient's ability to carry out ADLs; assess patient's baseline for ADL function and identify physical deficits which impact ability to perform ADLs (bathing, care of mouth/teeth, toileting, grooming, dressing, etc.)  - Assess/evaluate cause of self-care deficits   - Assess range of motion  - Assess patient's mobility; develop plan if impaired  - Assess patient's need for assistive devices and provide as appropriate  - Encourage maximum independence but intervene and supervise when necessary  - Involve family in performance of ADLs  - Assess for home care needs following discharge   - Consider OT consult to assist with ADL evaluation and planning for discharge  - Provide patient education as appropriate  Outcome: Progressing  Goal: Maintains/Returns to pre admission functional level  Description: INTERVENTIONS:  - Perform AM-PAC 6 Click Basic Mobility/ Daily Activity assessment daily.  - Set and communicate daily mobility goal to care team and patient/family/caregiver.   - Collaborate with rehabilitation services on mobility goals if consulted  - Perform Range of Motion 3 times a day.  - Reposition patient every 2 hours.  - Dangle patient 3 times a day  - Stand patient 3 times a day  - Ambulate patient 3 times a day  - Out of bed to chair 3 times a day   - Out of bed for meals 3 times a day  - Out of bed for toileting  - Record patient progress and toleration of activity level   Outcome: Progressing

## 2024-10-28 NOTE — CASE MANAGEMENT
Case Management Assessment & Discharge Planning Note    Patient name Tom Stewart  Location /-01 MRN [id number]  : 1935 Date 10/28/2024       Current Admission Date: 10/25/2024  Current Admission Diagnosis:Acute on chronic heart failure with preserved ejection fraction (HCC)   Patient Active Problem List    Diagnosis Date Noted Date Diagnosed    Acute respiratory failure with hypoxia (HCC) 10/25/2024     Sebaceous cyst 2024     Nose ulceration 2024     Primary hypertension 2023     Acute on chronic heart failure with preserved ejection fraction (HCC) 2023     Stage 4 chronic kidney disease (HCC) 2023     Gait instability 2022     Hearing loss 2022     Other Alzheimer's disease (HCC) 2022     Other proteinuria 2021     Vitamin D deficiency 2021     Ambulatory dysfunction 2021     Fall 2020     Type 2 diabetes mellitus with stage 4 chronic kidney disease, without long-term current use of insulin ([facility]) 2019     Paroxysmal atrial fibrillation ([facility]) 2019     History of CVA (cerebrovascular accident) 2019     Mixed hyperlipidemia due to type 2 diabetes mellitus  ([facility]) 2017     Coronary artery disease involving native coronary artery of native heart without angina pectoris 2017     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 ([facility]) 2017       LOS (days): 3  Geometric Mean LOS (GMLOS) (days):   Days to GMLOS:     OBJECTIVE:    Risk of Unplanned Readmission Score: 16.17         Current admission status: Inpatient       Preferred Pharmacy:   [id number] - [place] - [address] [facility]  [address] [place] [address]  Phone: [phone] Fax: [phone]    [facility] - [place] [id number] [address]  Phone: [phone] Fax: [phone]    [facility] - [place], [place] - [address]  [address]  [facility] [address]  [facility] [address]  Phone: [phone] Fax: [phone]    [id number] - [place], [place] - [address]  [address]  [place] [address]  Phone: [phone] Fax: [phone]    Primary Care Provider: Beto Garnett DO    Primary Insurance: MEDICARE  Secondary Insurance: [employer] LIFE    ASSESSMENT:  Active Health Care Proxies       JunitoChanel beckwith Health Care Representative - Daughter   Primary Phone: [phone] (Mobile)                 Advance Directives  Does patient have a Health Care POA?: Yes  Does patient have Advance Directives?: Yes  Advance Directives: Power of  for health care  Primary Contact: JunitoChanel beckwith   Health Care Representative - Daughter  Primary Phone: [phone] (         Readmission Root Cause  30 Day Readmission: No    Patient Information  Admitted from:: Home  Mental Status: Alert, Confused  During Assessment patient was accompanied by: Daughter  Assessment information provided by:: Daughter  Primary Caregiver: Self  Support Systems: Daughter, Spouse/significant other  County of Residence: [place]  [facility] do you live in?: [place]  Home entry access options. Select all that apply.: Stairs  Number of steps to enter home.: 2  Do the steps have railings?: Yes  Type of Current Residence: [facility]  Living Arrangements: Lives w/ Spouse/significant other  Is patient a ?: No    Activities of Daily Living Prior to Admission  Functional Status: Assistance  Completes ADLs independently?: No  Level of ADL dependence: Assistance  Ambulates independently?: No  Level of ambulatory dependence: Assistance  Does patient use assisted devices?: Yes  Assisted Devices (DME) used: Straight Cane  Does patient currently own DME?: Yes  What DME does the patient currently own?: Straight Cane  Does patient have a history of Outpatient Therapy (PT/OT)?: No  Does the  patient have a history of Short-Term Rehab?: No  Does patient have a history of HHC?: No  Does patient currently have HHC?: No         Patient Information Continued  Income Source: SSI/SSD  Does patient have prescription coverage?: Yes  Does patient receive dialysis treatments?: No  Does patient have a history of substance abuse?: No  Does patient have a history of Mental Health Diagnosis?: No         Means of Transportation  Means of Transport to Appts:: Family transport          DISCHARGE DETAILS:    Discharge planning discussed with:: Pt dtr Chanel  Freedom of Choice: Yes  Comments - Freedom of Choice: CM met with pt enrico Buchanan at bedside and introduced self/role. As per pt dtr, pt is alert and verbally responsive able to make simple needs known. Pt dtr is aware that pt has a level 2 reccomednation. [facility] referral made and pt dtr choice was PVM. PVM resreved on [facility]IDIN. CM dept continues to follow and assist with pt dc plans.  CM contacted family/caregiver?: Yes  Were Treatment Team discharge recommendations reviewed with patient/caregiver?: Yes  Did patient/caregiver verbalize understanding of patient care needs?: N/A- going to facility  Were patient/caregiver advised of the risks associated with not following Treatment Team discharge recommendations?: Yes    Contacts  Patient Contacts: joselyn  Relationship to Patient:: Family  Contact Method: In Person  Reason/Outcome: Continuity of Care, Discharge Planning, Emergency Contact, Referral    Requested Home Health Care         Is the patient interested in HHC at discharge?: No    DME Referral Provided  Referral made for DME?: No    Other Referral/Resources/Interventions Provided:  Interventions: None Indicated    Would you like to participate in our [facility] service program?  : No - Declined    Treatment Team Recommendation: Short Term Rehab  Discharge Destination Plan:: Short Term Rehab  Transport at Discharge : [facility]                     Accepting Facility Name, City & State : [facility], [place] [address] [place] [address]  Receiving Facility/Agency Phone Number: Phone: [phone]  Facility/Agency Fax Number: Fax: [phone]          normal (ped)...

## 2024-10-28 NOTE — PLAN OF CARE
Problem: PHYSICAL THERAPY ADULT  Goal: Performs mobility at highest level of function for planned discharge setting.  See evaluation for individualized goals.  Description: Treatment/Interventions: Functional transfer training, LE strengthening/ROM, Elevations, Therapeutic exercise, Endurance training, Cognitive reorientation, Patient/family training, Bed mobility, Gait training, OT  Equipment Recommended: Walker       See flowsheet documentation for full assessment, interventions and recommendations.  Note: Prognosis: Good  Problem List: Decreased strength, Decreased endurance, Impaired balance, Decreased mobility, Decreased cognition  Assessment: Pt is 88 year old male seen for PT evaluation s/p admit to [facility] on 10/25/2024 with Acute on chronic heart failure with preserved ejection fraction (HCC). PT consulted to assess pt's functional mobility and discharge needs. Order placed for PT evaluation and treatment. Comorbidities affecting pt's physical performance at time of assessment include mixed hyperlipidemia due to type 2 DM, CAD, history of CVA, paroxysmal atrial fibrillation, stage 4 CKD, ambulatory dysfunction, primary hypertension, Alzheimer's disease, and acute respiratory failure with hypoxia. Prior to hospitalization, pt was independent with all functional mobility without an AD. Pt ambulates unrestricted distances on all terrain and elevations. Pt resides with his significant other, in a one level house with four steps to enter. Pt is a questionable historian. Information regarding home setup and prior level of function were obtained from patient and chart review and further clarification is required. Personal factors affecting pt at time of initial evaluation include stairs to enter home, ambulating with an assistive device, difficulty ambulating household distances, inability to ambulate community distances, inability to navigate level surfaces without external assistance, unable to  perform dynamic tasks in the community, limited home support, difficulty performing ADLs, inability to perform IADLs, and limited insight into impairments. Please find objective findings from PT assessment regarding body systems outlined above with impairments and limitations including weakness, impaired balance, decreased endurance, gait deviations, decreased activity tolerance, decreased functional mobility tolerance, fall risk, and decreased cognition. The following objective measures were performed on initial evaluation Barthel Index: 50/100, Modified [place]: 4 (moderate/severe disability), and AM-PAC 6-Clicks: 16/24. Pt's clinical presentation is currently unstable/unpredictable seen in pt's presentation of need for ongoing medical management/monitoring, pt is a fall risk, pt requires use of RW for safe ambulation, and pt requires cues and assist for safety with functional mobility. Pt to benefit from continued PT treatment to address deficits as defined above and maximize pt's level of function and independence with mobility. From a PT standpoint, recommendation at time of discharge would be level 2, moderate resource intensity in order to facilitate return to prior level of function.    Barriers to Discharge: Inaccessible home environment, Decreased caregiver support     Rehab Resource Intensity Level, PT: II (Moderate Resource Intensity)    See flowsheet documentation for full assessment.

## 2024-10-28 NOTE — PROGRESS NOTES
Progress Note - Hospitalist   Name: Tom Stewart 88 y.o. male I MRN: [id number]  Unit/Bed#: -01 I Date of Admission: 10/25/2024   Date of Service: 10/28/2024 I Hospital Day: 3    Assessment & Plan  Acute on chronic heart failure with preserved ejection fraction (HCC)  Presents to ED c/o worsening SOB and chest pain/pressure.  Patient poor historian, most of history obtained from daughter  Per chart review, recently seen by PCP 10/22 patient complaining of SOB and leg swelling with reported O2 sat 81-90%  Patient was instructed to increase home Lasix to twice daily x 3 days and report to ED if O2 dropped below 90  Daughter reports patient has poor medication adherence due to dementia.  He lives at home with girlfriend who does not help him with his medication, daughter says she helps him as much as she can    Lab Results   Component Value Date    LVEF 50 08/15/2023    BNP 2,374 (H) 10/25/2024    BNP 2,419 (H) 10/22/2024     Presents with increased shortness of breath  CXR with possible pulmonary venous congestion, pending final read  NYHA Class III., Stage C  Patient is currently volume overloaded.- bilateral lower extremity pitting edema    Plan:  GDMT:  Diuretic: Lasix 60 mg IV BID continued for another day, B-Blocker: Lopressor 25 mg BID, ACE/ARB/ARNi: Lisinopril 20 mg  Sodium restriction 2g  CMP, magnesium tomorrow a.m; Goal Mg > 2 and K > 4; Replete prn  HOB > 30°, Daily standing weights, Measure I/O  Consult nutrition for dietary education  Cards consulted  Echo ordered - pending    Acute respiratory failure with hypoxia (HCC)  Currently SpO2: 97 % on Nasal Cannula O2 Flow Rate (L/min): 2 L/min   On room air at home, 85% on arrival  Wean oxygen as appropriate  Coronary artery disease involving native coronary artery of native heart without angina pectoris  Hx of CAD s/p PCI Jan 2020  C/o chest pain, EKG without ischemia. Suspected d/t CHF exacerbation  Continue pta ASA and eliquis  Paroxysmal atrial  fibrillation (HCC)  Stable  Continue pta eliquis and lopressor  Type 2 diabetes mellitus with stage 4 chronic kidney disease, without long-term current use of insulin (HCC)  Lab Results   Component Value Date    HGBA1C 7.1 (H) 06/11/2024     Recent Labs     10/26/24  0534 10/26/24  0652 10/27/24  0455 10/27/24  0619 10/27/24  2040 10/28/24  0429 10/28/24  0618 10/28/24  1114   POCGLU  --    < >  --    < > 151*  --  124 209*   GLUC 148*  --  127  --   --  132  --   --     < > = values in this interval not displayed.     Hold home regimen while inpatient and resume on discharge  Diabetic diet  Insulin regimen  Glucose checks and Insulin correction ACHS  Goal -180 while admitted, adjusting insulin regimen as appropriate  Monitor for hypoglycemia and treat per protocol    Stage 4 chronic kidney disease (HCC)  Cr 1.94 within baseline  Continue to monitor Cr, avoid nephrotoxic agents  Lab Results   Component Value Date    EGFR 29 10/28/2024    EGFR 29 10/27/2024    EGFR 32 10/26/2024    CREATININE 1.99 (H) 10/28/2024    CREATININE 2.00 (H) 10/27/2024    CREATININE 1.85 (H) 10/26/2024   Monitor renal function  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 (HCC)  Blood Pressure: 114/65   BP stable since admission  Continue pta amlodipine and lisinopril  BP PRN meds IV in place for BP > 160    Other Alzheimer's disease (HCC)  Pt w/ known hx of dementia, currently at baseline A&O x2  Is not on medication  Fall precautions, delirium precautions in place  PT/OT consulted  Mixed hyperlipidemia due to type 2 diabetes mellitus  (HCC)  Lab Results   Component Value Date    CHOLESTEROL 199 06/11/2024    TRIG 138 06/11/2024    HDL 46 06/11/2024    LDLCALC 125 (H) 06/11/2024   Hold off statin for now      Blood Sugar Average: Last 72 hrs:  (P) 161.8556305058617880    History of CVA (cerebrovascular accident)  Known hx; superimposed on dementia  Primary hypertension  Blood Pressure: 114/65    Plan:  Continue  home meds  Monitor blood pressure  PRN IV Labetalol and Hydralazine for SBP > 160    Ambulatory dysfunction  PT/OT Eval and treat  Trend Mobility Scores  Encourage appropriate mobility to achieve and improve upon HLM Goals as noted  Plan for placement to rehab on discharge  Fall    Gait instability    Hearing loss    Encounter for delirium elderly at risk (DEAR) screening    Frailty syndrome in geriatric patient    Aortic stenosis      VTE Pharmacologic Prophylaxis: VTE Score: 7 High Risk (Score >/= 5) - Pharmacological DVT Prophylaxis Ordered: apixaban (Eliquis). Sequential Compression Devices Ordered.    Mobility:   Basic Mobility Inpatient Raw Score: 16  JH-HLM Goal: 5: Stand one or more mins  JH-HLM Achieved: 7: Walk 25 feet or more  JH-HLM Goal achieved. Continue to encourage appropriate mobility.    Patient Centered Rounds: I performed bedside rounds with nursing staff today.   Discussions with Specialists or Other Care Team Provider: cardiology, gerontology    Education and Discussions with Family / Patient: Updated  (daughter) at bedside.    Current Length of Stay: 3 day(s)  Current Patient Status: Inpatient   Certification Statement: The patient will continue to require additional inpatient hospital stay due to plan above; placement  Discharge Plan: Anticipate discharge in 24-48 hrs to rehab facility.    Code Status: Level 3 - DNAR and DNI    Subjective   Patient offers no complaints at bedside denies any shortness of breath, chest pain, nausea, abdominal pain.  However, daughter at bedside states that patient does have waxing and waning mentation where he forgets what has happened in the recent events.  As a result, he may not remember having chest pain if he had had it recently.  Discussed overall plan to continue monitoring and then eventually plan for placement to rehab facility on discharge..  Daughter is understanding and agreeable.  All questions answered    Objective :  Temp:  [97.3 °F  (36.3 °C)-98.2 °F (36.8 °C)] 97.3 °F (36.3 °C)  HR:  [50-75] 50  BP: (114-161)/(65-87) 114/65  Resp:  [17-19] 19  SpO2:  [92 %-99 %] 97 %  O2 Device: Nasal cannula  Nasal Cannula O2 Flow Rate (L/min):  [2 L/min] 2 L/min    Body mass index is 28.94 kg/m².     Input and Output Summary (last 24 hours):     Intake/Output Summary (Last 24 hours) at 10/28/2024 1647  Last data filed at 10/28/2024 1501  Gross per 24 hour   Intake 660 ml   Output 1820 ml   Net -1160 ml       Physical Exam  Vitals and nursing note reviewed.   Constitutional:       General: He is not in acute distress.     Appearance: He is well-developed. He is ill-appearing (Chronically). He is not diaphoretic.   HENT:      Head: Normocephalic and atraumatic.      Nose: Nose normal.   Eyes:      General: No scleral icterus.     Conjunctiva/sclera: Conjunctivae normal.      Pupils: Pupils are equal, round, and reactive to light.   Neck:      Thyroid: No thyromegaly.   Cardiovascular:      Rate and Rhythm: Normal rate and regular rhythm.      Heart sounds: Normal heart sounds. No murmur heard.  Pulmonary:      Effort: Pulmonary effort is normal.      Breath sounds: Rales present. No wheezing or rhonchi.   Abdominal:      General: Bowel sounds are normal. There is no distension.      Palpations: Abdomen is soft.      Tenderness: There is no abdominal tenderness.   Musculoskeletal:         General: No swelling, tenderness, deformity or signs of injury.      Cervical back: Neck supple.      Right lower leg: Edema (mild) present.      Left lower leg: Edema (mild) present.   Skin:     General: Skin is warm and dry.   Neurological:      Mental Status: He is alert. Mental status is at baseline. He is disoriented.   Psychiatric:         Behavior: Behavior normal.         Thought Content: Thought content normal.         Judgment: Judgment normal.           Lines/Drains:              Lab Results: I have reviewed the following results:   Results from last 7 days   Lab  Units 10/28/24  0429 10/26/24  0534 10/25/24  1932   WBC Thousand/uL 6.95   < > 5.84   HEMOGLOBIN g/dL 11.1*   < > 10.7*   HEMATOCRIT % 35.2*   < > 34.0*   PLATELETS Thousands/uL 172   < > 160   SEGS PCT %  --   --  80*   LYMPHO PCT %  --   --  10*   MONO PCT %  --   --  7   EOS PCT %  --   --  2    < > = values in this interval not displayed.     Results from last 7 days   Lab Units 10/28/24  0429 10/27/24  0455 10/26/24  0534   SODIUM mmol/L 135   < > 135   POTASSIUM mmol/L 3.9   < > 4.1   CHLORIDE mmol/L 97   < > 99   CO2 mmol/L 30   < > 28   BUN mg/dL 47*   < > 37*   CREATININE mg/dL 1.99*   < > 1.85*   ANION GAP mmol/L 8   < > 8   CALCIUM mg/dL 9.2   < > 9.4   ALBUMIN g/dL  --   --  4.2   TOTAL BILIRUBIN mg/dL  --   --  0.90   ALK PHOS U/L  --   --  47   ALT U/L  --   --  23   AST U/L  --   --  19   GLUCOSE RANDOM mg/dL 132   < > 148*    < > = values in this interval not displayed.         Results from last 7 days   Lab Units 10/28/24  1114 10/28/24  0618 10/27/24  2040 10/27/24  1608 10/27/24  1035 10/27/24  0619 10/26/24  2047 10/26/24  1549 10/26/24  1135 10/26/24  0652 10/25/24  2208   POC GLUCOSE mg/dl 209* 124 151* 160* 183* 118 125 206* 187* 144* 166*               Recent Cultures (last 7 days):         Imaging Results Review: No pertinent imaging studies reviewed.  Other Study Results Review: No additional pertinent studies reviewed.    Last 24 Hours Medication List:     Current Facility-Administered Medications:     acetaminophen (TYLENOL) tablet 650 mg, Q4H PRN    amLODIPine (NORVASC) tablet 10 mg, Daily    apixaban (ELIQUIS) tablet 2.5 mg, BID    aspirin (ECOTRIN LOW STRENGTH) EC tablet 81 mg, Daily    finasteride (PROSCAR) tablet 5 mg, Daily    furosemide (LASIX) injection 60 mg, BID (diuretic)    hydrALAZINE (APRESOLINE) injection 10 mg, Q6H PRN    insulin lispro (HumALOG/ADMELOG) 100 units/mL subcutaneous injection 1-6 Units, TID AC **AND** Fingerstick Glucose (POCT), TID AC    isosorbide  dinitrate (ISORDIL) tablet 10 mg, BID    labetalol (NORMODYNE) injection 10 mg, Q6H PRN    lidocaine (LIDODERM) 5 % patch 1 patch, Daily    lisinopril (ZESTRIL) tablet 20 mg, Daily    metoprolol tartrate (LOPRESSOR) tablet 25 mg, Daily    Insert peripheral IV, Once **AND** sodium chloride (PF) 0.9 % injection 3 mL, Q1H PRN    Administrative Statements   Today, Patient Was Seen By: Jake Bell DO      **Please Note: This note may have been constructed using a voice recognition system.**

## 2024-10-28 NOTE — ASSESSMENT & PLAN NOTE
Lab Results   Component Value Date    HGBA1C 7.1 (H) 06/11/2024       Recent Labs     10/27/24  1608 10/27/24  2040 10/28/24  0618 10/28/24  1114   POCGLU 160* 151* 124 209*       Blood Sugar Average: Last 72 hrs:  (P) [id number]    PTA: Glipizide, currently holding at this time  Continue POC Glucose  Hypoglycemia protocol  SSI TID with meals

## 2024-10-28 NOTE — ASSESSMENT & PLAN NOTE
Presents to ED c/o worsening SOB and chest pain/pressure.  Patient poor historian, most of history obtained from daughter  Per chart review, recently seen by PCP 10/22 patient complaining of SOB and leg swelling with reported O2 sat 81-90%  Patient was instructed to increase home Lasix to twice daily x 3 days and report to ED if O2 dropped below 90  Daughter reports patient has poor medication adherence due to dementia.  He lives at home with girlfriend who does not help him with his medication, daughter says she helps him as much as she can    Lab Results   Component Value Date    LVEF 50 08/15/2023    BNP 2,374 (H) 10/25/2024    BNP 2,419 (H) 10/22/2024     Presents with increased shortness of breath  CXR with possible pulmonary venous congestion, pending final read  NYHA Class III., Stage C  Patient is currently volume overloaded.- bilateral lower extremity pitting edema    Plan:  GDMT:  Diuretic: Lasix 60 mg IV BID continued for another day, B-Blocker: Lopressor 25 mg BID, ACE/ARB/ARNi: Lisinopril 20 mg  Sodium restriction 2g  CMP, magnesium tomorrow a.m; Goal Mg > 2 and K > 4; Replete prn  HOB > 30°, Daily standing weights, Measure I/O  Consult nutrition for dietary education  Cards consulted  Echo ordered - pending

## 2024-10-28 NOTE — CONSULTS
Consultation - Geriatric Medicine   Name: Tom Stewart 88 y.o. male I MRN: [id number]  Unit/Bed#: -01 I Date of Admission: 10/25/2024   Date of Service: 10/28/2024 I Hospital Day: 3   Inpatient consult to Gerontology  Consult performed by: JALEEL España  Consult ordered by: Jake Bell DO        Physician Requesting Evaluation: Jake Bell DO   Reason for Evaluation / Principal Problem: Frailty, Alzheimer's disease    Assessment & Plan  Other Alzheimer's disease (HCC)  Cognitive decline  Alert and Oriented X 2  Baseline: AAOx2  Last MOCA completed 2022: 13/30, moderate  TSH 10/26/24: 2.547  Vit B12 2022: 1621   CT scan 4/2023: severe chronic arteriosclerosis  CT 2021 showing advanced microangiopathic change  Previously seen by [facility] 2022 s/p referral after noted forgetfulness/confusion in ED October 2021 by son  Staged as moderate dementia; positive family history of dementia (patient's brother)  Noted progressive decline in short and long term memory s/p stroke 2017  Goal is STR to LTC placement  Encounter for delirium elderly at risk (DEAR) screening  Patient at risk for delirium secondary to age, cognitive decline,  hospitalization, dehydration, immobility  Identify and treat underlying cause  Provide frequent redirection, reorientation, distraction techniques  Avoid deliriogenic medications such as tramadol, benzodiazepines, anticholinergics,  Benadryl  Treat pain, See geriatric pain protocol  Monitor for constipation and urinary retention  Encourage early and frequent moblization, OOB  Encourage Hydration/ Nutrition  Implement sleep hygiene, limit night time interuptions, group activities  Avoid physical restraints  Use chemical restraint only when other efforts have failed, recommend zyprexa 2.5mg IM q8h prn  Monitor Qtc, if greater than 500 do not use antipsychotic medication  If QTc greater than 460, monitor and replete and deficiency of  K and Mg,  recheck EKG  EKG QTc 2023: 501; would refrain from antipsychotic medication unless recheck EKG  Frailty syndrome in geriatric patient  Clinical Frail Scale: 5/6- Mildly/Moderately Frail  More evident slowing, needs help high order IADLs (transport, bills, medications)  Progressively impairs shopping and walking outside alone, meal prep and housework  Daughter set up alarm system for medication administration, however patient not compliant  Noted days of medications still being in pill box  Home safety: continues to use riding lawnmowers, however uses RW/gait instability noted  Several area rugs in home  No shower grab bars, chair per patient   Albumin: 4.2  Acute on chronic heart failure with preserved ejection fraction (HCC)  Wt Readings from Last 3 Encounters:   10/28/24 91.5 kg (201 lb 11.5 oz)   10/22/24 95.6 kg (210 lb 12.8 oz)   09/03/24 90.2 kg (198 lb 12.8 oz)     Presented to ED with SOB, CP  BNP upon arrival 2,374  Recently seen by PCP on 10/22 for SOB and bilateral leg swelling and was suppose to increase Lasix dose to twice daily for 3 days; unsure compliance   Upon exam, SOB however 2L NC in place and O2 Sat 98%  Denies CP  CXR 10/25 showing: CHF with mild perihilar edema   Trace bilateral lower extremity edema noted  Continue Cardiac diet, fluid restriction 1800 mL   Daily weights    Mixed hyperlipidemia due to type 2 diabetes mellitus  (HCC)  Lab Results   Component Value Date    HGBA1C 7.1 (H) 06/11/2024       Recent Labs     10/27/24  1608 10/27/24  2040 10/28/24  0618 10/28/24  1114   POCGLU 160* 151* 124 209*       Blood Sugar Average: Last 72 hrs:  (P) [id number]      History of CVA (cerebrovascular accident)  Hx of CVA in 2017  Paroxysmal atrial fibrillation (HCC)  Heart rate controlled at this time  Continue Eliquis and Metoprolol  Type 2 diabetes mellitus with stage 4 chronic kidney disease, without long-term current use of insulin (HCC)  Lab Results   Component Value Date    HGBA1C  7.1 (H) 06/11/2024       Recent Labs     10/27/24  1608 10/27/24  2040 10/28/24  0618 10/28/24  1114   POCGLU 160* 151* 124 209*       Blood Sugar Average: Last 72 hrs:  (P) [id number]    PTA: Glipizide, currently holding at this time  Continue POC Glucose  Hypoglycemia protocol  SSI TID with meals    Ambulatory dysfunction  At a baseline ambulates with RW/cane  Has four steps to enter home, two steps to the backyard  PT/OT following  Fall precautions  Out of bed as tolerated  Encourage early and frequent mobilization  Encourage adequate hydration and nutrition  Provide adequate pain management   Goal is to STR  Continue with PT/OT for continued strength and balance training   Stage 4 chronic kidney disease (HCC)  Lab Results   Component Value Date    EGFR 29 10/28/2024    EGFR 29 10/27/2024    EGFR 32 10/26/2024    CREATININE 1.99 (H) 10/28/2024    CREATININE 2.00 (H) 10/27/2024    CREATININE 1.85 (H) 10/26/2024     Appears to be at baseline  Avoid nephrotoxic medications  Avoid hypotension    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 (HCC)  Wt Readings from Last 3 Encounters:   10/28/24 91.5 kg (201 lb 11.5 oz)   10/22/24 95.6 kg (210 lb 12.8 oz)   09/03/24 90.2 kg (198 lb 12.8 oz)     BP well controlled  PTA medications: Amlodipine, Lisinopril; continuing at this time  Acute respiratory failure with hypoxia (HCC)  Mid 80's O2 saturation upon arrival   Currently 2L NC, continue to wean off  Hearing loss  Patient does have hearing impairment   Does not wear hearing aids, however hearing decreased bilaterally  Hearing impairment  correlated with depression, cognitive impairment, delirium and falls in the older adult  Speak clearly  Use sound amplifier  Speak face to face  Use clear dictation and enunciation of words  Fall  Frequent falls within the last year per daughter, however did not seek medical attention  Area rugs, steps to enter home, gait instability, dog as tripping  "hazards within home  [place] fall precautions   Assess patient frequently for physical needs, encourage use of assistant devices as needed and directed by PT/OT  Identify cognitive and physical deficits and behaviors that affect risk of falls  Consider moving patient closer to nursing station to monitor more closely for impulsive behavior which may increase risk of falls  Educate patient/family on patient safety including physical limitations and importance of using call bell for assistance   Modify environment to reduce risk of injury including disconnecting from pole when not in use, ensuring adequate lighting in room and restroom, ensuring that path to restroom is clear and free of trip hazards  PT/OT consulted to assist with strengthening/mobility and assist with discharge planning to appropriate level of care      History of Present Illness   Hx and PE limited by: n/a  HPI: Tom Stewart is a 88 y.o. year old male who presents with chest pain and shortness of breath over the past several weeks. He increased his Lasix to BID for 3 days per PCP with complaints of bilateral leg swelling and SOB. Medication compliance questioned by daughters due to memory issues from Alzheimer's disease. The patient lives with girlfriend who does not help him with medication administration. He is typically alert and oriented x 2 at baseline. He has a past medical history of Alzheimer's dementia, CHF, CAD, Afib, Diabetes, Stage 4 CKD, Hypertension, CABG and bilateral hip replacement. He is being seen today for evaluation with Geriatrics.     He lives in a mobile home with 4 steps to enter and two steps in the back of house, with his girlfriend. He has a daughter and two sons. His daughter is his POA and helps as much as she can for him at home. He is independent with ADLs and needs assistance with IADLs. He states his girlfriend gives him cereal for breakfast, he does not eat lunch, and states \"whatever she cooks\" is what he eats " "for dinner, however daughter notes she frequently gives him PBJ sandwiches. His daughter set up an alarm system for his medications but has noticed that some days he does not take his medications. He ambulates with a RW and cane, and has had frequent falls within the last year. He is hard of hearing but does not wear hearing aids. He wears reading glasses. He does wear dentures. He has support from his daughter and sons.     Upon exam, he is sitting in his recliner. He is calm and pleasant. Per daughter, he is normally alert to self, sometimes aware of time, place and situation. Currently he is alert x 2 to self and place. When asked year he states \"I don't know\" and asked month, he stated February. He currently states he is short of breath, denies pain. He states he slept well and ate breakfast and lunch.     Review of Systems   Unable to perform ROS: Other (Alzheimer's disease)       Geriatric Conditions: Memory: Alzheimer's dementia, Balance: use of AD, Mobility: use of RW/cane, Falls: frequent within the last year, Assistive Devices:  Walker:  , Cane:  , Fraility: mild but more moderately frail, Nutrition/weight loss: questionable meals at home, eats breakfast possibly dinner, Hearing impairment: bilaterally, does not wear hearing aids, Gait dysfunction:      I have reviewed the patient's PMH, PSH, Social History, Family History, Meds, and Allergies  Historical Information   Past Medical History:   Diagnosis Date    Acute deep vein thrombosis (DVT) of brachial vein of left upper extremity (HCC) 11/24/2017    Acute deep vein thrombosis (DVT) of left peroneal vein ([facility])     Acute ST elevation myocardial infarction ([facility])     Aortic valve stenosis     Arthritis     Atrial fibrillation (HCC)     Basilar artery stenosis     Basilar artery stenosis     Basilar artery stenosis 05/04/2020    MRA Head wo contrast (12/13/2019)  IMPRESSION:   Multifocal intracranial atherosclerotic disease with moderate to severe stenosis " at the origin of the left M1 segment with additional atherosclerotic disease noted in the distal right M1 and proximal inferior left M2 branches.   Moderate to severe stenosis in the proximal and mid basilar artery with nonvisualization of the right intradural vertebral    Benign prostatic hyperplasia with lower urinary tract symptoms     CAD (coronary artery disease)     CAD in native artery 11/24/2017    Underwent cardiac catheterization on 1/30/2020 and had mid and distal LAD stent placed  Cardiac PCI (1/30/2020)  SUMMARY   CORONARY CIRCULATION: Mid LAD: There was a 90 % stenosis at the site of a prior stent. Distal LAD: There was a 90 % stenosis at the site of a prior stent. Left posterior descending artery: There was a diffuse 50 % stenosis.   1ST LESION INTERVENTIONS: A balloon angioplasty wit    Cerebrovascular small vessel disease 11/12/2020    Chronic kidney disease     Closed fracture of multiple ribs of left side with routine healing 09/03/2020    Coronary artery disease     Dementia (HCC)     Diabetes mellitus (HCC)     DM2 (diabetes mellitus, type 2) (HCC)     Elevated troponin 07/19/2021    Herpes zoster without complication 07/28/2021    History of CVA (cerebrovascular accident) 02/21/2019    History of DVT of peroneal vein left lower extremity 12/16/2019    History of transfusion     HLD (hyperlipidemia)     HTN (hypertension)     Infected sebaceous cyst of skin 06/08/2021    Lump in chest 06/01/2021    Middle cerebral artery stenosis     Mild to moderate aortic stenosis 10/09/2018    ECHO (7/2020)  AORTIC VALVE: Leaflets exhibited moderately increased thickness and moderate calcification. Transaortic velocity was increased due to valvular stenosis. There was mild to moderate stenosis. RICHY 1.4cm, mean pressure gradient 11mmHg, peak velocity 2.15m/s There was mild regurgitation.    Myocardial infarction (HCC)     Near syncope 07/19/2021    Other proteinuria 10/08/2019    Proteinuria     Rib fractures  (right) 07/31/2020    Stroke (HCC)     Symptomatic bradycardia     Transient cerebral ischemia     Vitamin D deficiency      Past Surgical History:   Procedure Laterality Date    CARDIAC CATHETERIZATION      Outcome: successful; last assessed: 02/03/2015    CARDIAC CATHETERIZATION  11/26/2019    CORONARY ANGIOPLASTY WITH STENT PLACEMENT  2008    stent to LAD     CORONARY ARTERY BYPASS GRAFT      HAND SURGERY      thumb    HIP HARDWARE REMOVAL      JOINT REPLACEMENT      TOTAL HIP ARTHROPLASTY Right     TOTAL HIP ARTHROPLASTY Bilateral      Social History     Tobacco Use    Smoking status: Never     Passive exposure: Never    Smokeless tobacco: Never   Vaping Use    Vaping status: Never Used   Substance and Sexual Activity    Alcohol use: Never    Drug use: Never    Sexual activity: Not Currently     Partners: Female     Birth control/protection: None     E-Cigarette/Vaping    E-Cigarette Use Never User      E-Cigarette/Vaping Substances    Nicotine No     THC No     CBD No     Flavoring No     Other No     Unknown No      Family History   Problem Relation Age of Onset    Emphysema Father     Emphysema Sister      Social History     Tobacco Use    Smoking status: Never     Passive exposure: Never    Smokeless tobacco: Never   Vaping Use    Vaping status: Never Used   Substance and Sexual Activity    Alcohol use: Never    Drug use: Never    Sexual activity: Not Currently     Partners: Female     Birth control/protection: None       Current Facility-Administered Medications:     acetaminophen (TYLENOL) tablet 650 mg, Q4H PRN    amLODIPine (NORVASC) tablet 10 mg, Daily    apixaban (ELIQUIS) tablet 2.5 mg, BID    aspirin (ECOTRIN LOW STRENGTH) EC tablet 81 mg, Daily    finasteride (PROSCAR) tablet 5 mg, Daily    furosemide (LASIX) injection 60 mg, BID (diuretic)    hydrALAZINE (APRESOLINE) injection 10 mg, Q6H PRN    insulin lispro (HumALOG/ADMELOG) 100 units/mL subcutaneous injection 1-6 Units, TID AC **AND**  Fingerstick Glucose (POCT), TID AC    isosorbide dinitrate (ISORDIL) tablet 10 mg, BID    labetalol (NORMODYNE) injection 10 mg, Q6H PRN    lidocaine (LIDODERM) 5 % patch 1 patch, Daily    lisinopril (ZESTRIL) tablet 20 mg, Daily    metoprolol tartrate (LOPRESSOR) tablet 25 mg, Daily    Insert peripheral IV, Once **AND** sodium chloride (PF) 0.9 % injection 3 mL, Q1H PRN  Prior to Admission Medications   Prescriptions Last Dose Informant Patient Reported? Taking?   Blood Glucose Monitoring Suppl (ONE TOUCH ULTRA MINI) w/Device KIT Unknown Child No No   Sig: Use daily   Omega-3 Fatty Acids (FISH OIL CONCENTRATE PO) 10/24/2024 Child Yes Yes   Sig: Take 1 tablet by mouth daily   acetaminophen (TYLENOL) 500 mg tablet Unknown Child Yes No   Sig: Take 500 mg by mouth every 6 (six) hours as needed for mild pain   amLODIPine (NORVASC) 10 mg tablet 10/25/2024  No Yes   Sig: TAKE 1 TABLET BY MOUTH DAILY   apixaban (ELIQUIS) 2.5 mg 10/25/2024 Child No Yes   Sig: Take 1 tablet (2.5 mg total) by mouth 2 (two) times a day   aspirin (ECOTRIN LOW STRENGTH) 81 mg EC tablet 10/24/2024 Child No Yes   Sig: Take 1 tablet (81 mg total) by mouth daily   cholecalciferol (VITAMIN D3) 1,000 units tablet  Child Yes No   Sig: Take 1,000 Units by mouth daily   finasteride (PROSCAR) 5 mg tablet 10/24/2024 Child No Yes   Sig: TAKE 1 TABLET BY MOUTH DAILY   furosemide (LASIX) 40 mg tablet 10/25/2024 Child No Yes   Sig: TAKE 1 TABLET BY MOUTH DAILY   gabapentin (NEURONTIN) 100 mg capsule 10/25/2024  No Yes   Sig: TAKE 1 CAPSULE BY MOUTH TWICE  DAILY   glipiZIDE (GLUCOTROL XL) 2.5 mg 24 hr tablet 10/25/2024 Child No Yes   Sig: TAKE 1 TABLET BY MOUTH DAILY   glucose blood (OneTouch Ultra) test strip Unknown Child No No   Sig: Check blood sugars once daily. Please substitute with appropriate alternative as covered by patient's insurance. Dx: E11.65   isosorbide dinitrate (ISORDIL) 10 mg tablet 10/25/2024 Child No Yes   Sig: TAKE 1 TABLET BY MOUTH  TWICE  DAILY   lisinopril (ZESTRIL) 20 mg tablet 10/25/2024 Child No Yes   Sig: TAKE 1 TABLET BY MOUTH DAILY   metoprolol tartrate (LOPRESSOR) 25 mg tablet 10/25/2024 Child No Yes   Si/2 tab once a day   vitamin B-12 (CYANOCOBALAMIN) 100 MCG tablet 10/24/2024 Child Yes Yes   Sig: Take 1,000 mcg by mouth daily      Facility-Administered Medications: None     Celecoxib, Hydromorphone, Pravastatin, and Statins    Meds/Allergies   Home medication review  Amlodipine 10 mg p.o. once daily  Finasteride 5 mg p.o. once daily  Furosemide 40 mg p.o. once daily  Gabapentin 100 mg p.o. BID  Glipizide 1 mg p.o. once daily  Isosorbide 10 mg p.o. BID  Lisinopril 20 mg p.o. once daily  Metoprolol  12.5 mg p.o. once daily    Personally confirmed with [facility] Rx Mail [phone]    OTC medications not confirmed with Pharmacy  Tylenol, ASA, Vit D3, Fish oil, Vit B12    Objective :  Temp:  [97.5 °F (36.4 °C)-98.2 °F (36.8 °C)] 97.5 °F (36.4 °C)  HR:  [51-75] 63  BP: (121-161)/(75-87) 161/85  Resp:  [17-19] 18  SpO2:  [92 %-98 %] 97 %  O2 Device: Nasal cannula  Nasal Cannula O2 Flow Rate (L/min):  [2 L/min] 2 L/min    Physical Exam  Vitals and nursing note reviewed.   Constitutional:       General: He is not in acute distress.     Appearance: He is well-developed.   HENT:      Head: Normocephalic and atraumatic.      Mouth/Throat:      Mouth: Mucous membranes are moist.      Pharynx: Oropharynx is clear.   Cardiovascular:      Rate and Rhythm: Normal rate and regular rhythm.      Pulses: Normal pulses.      Heart sounds: Normal heart sounds.   Pulmonary:      Effort: Pulmonary effort is normal. No respiratory distress.      Breath sounds: Normal breath sounds.      Comments: 2L NC  Abdominal:      General: Abdomen is flat. Bowel sounds are normal. There is no distension.      Palpations: Abdomen is soft.      Tenderness: There is no abdominal tenderness.   Musculoskeletal:      Right lower leg: Edema (trace) present.       "Left lower leg: Edema (trace) present.   Skin:     General: Skin is warm and dry.      Capillary Refill: Capillary refill takes less than 2 seconds.   Neurological:      Mental Status: He is alert. Mental status is at baseline.      Gait: Gait abnormal (use of RW).   Psychiatric:         Mood and Affect: Mood normal.         Behavior: Behavior normal.           Lab Results: I have reviewed the following results:CBC/BMP:   .     10/28/24  0429   WBC 6.95   HGB 11.1*   HCT 35.2*      SODIUM 135   K 3.9   CL 97   CO2 30   BUN 47*   CREATININE 1.99*   GLUC 132   MG 2.0    , LFTs: No new results in last 24 hours. , Troponin,BNP:No new results in last 24 hours. , Vitamins B1, B6, B12, A, and D: No results found for: \"B1\", \"THYROGLB\", \"YQCAOADQ49\", \"[id number]\", TSH:   Results from last 7 days   Lab Units 10/26/24  0534   TSH 3RD GENERATON uIU/mL 2.547       Imaging Results Review: I personally reviewed the following image studies in PACS and associated radiology reports: chest xray, CT head, and MRI brain. My interpretation of the radiology images/reports is: CHF with mild perihilar edema and suggestion of minimal pleural effusions, cardiomegaly. CT head/MRI interpretation as listed above in A&P.  Other Study Results Review: EKG was reviewed.     Therapies:   Basic Mobility Inpatient Raw Score: 16  -[facility] Goal: 5: Stand one or more mins  -[facility] Achieved: 7: Walk 25 feet or more  PT: consulted  OT: consulted    VTE Prophylaxis: VTE covered by:  apixaban, Oral, 2.5 mg at 10/28/24 0923    and Sequential compression device (Venodyne)     Code Status: Level 3 - DNAR and DNI  Advance Directive and Living Will: Yes    Power of : Yes      Family and Social Support: Daughter, Chanel, son, Rohit and daughter in law Aleja      Goals of Care: STR    "

## 2024-10-28 NOTE — ASSESSMENT & PLAN NOTE
Lab Results   Component Value Date    HGBA1C 7.1 (H) 06/11/2024       Recent Labs     10/27/24  1608 10/27/24  2040 10/28/24  0618 10/28/24  1114   POCGLU 160* 151* 124 209*       Blood Sugar Average: Last 72 hrs:  (P) [id number]

## 2024-10-28 NOTE — ASSESSMENT & PLAN NOTE
Cr 1.94 within baseline  Continue to monitor Cr, avoid nephrotoxic agents  Lab Results   Component Value Date    EGFR 29 10/28/2024    EGFR 29 10/27/2024    EGFR 32 10/26/2024    CREATININE 1.99 (H) 10/28/2024    CREATININE 2.00 (H) 10/27/2024    CREATININE 1.85 (H) 10/26/2024   Monitor renal function

## 2024-10-28 NOTE — ASSESSMENT & PLAN NOTE
Most recent TTE shows moderate aortic stenosis.  Family wishes to proceed with conservative management.

## 2024-10-28 NOTE — ASSESSMENT & PLAN NOTE
Lab Results   Component Value Date    CHOLESTEROL 199 06/11/2024    TRIG 138 06/11/2024    HDL 46 06/11/2024    LDLCALC 125 (H) 06/11/2024   Hold off statin for now      Blood Sugar Average: Last 72 hrs:  (P) [id number]

## 2024-10-28 NOTE — ASSESSMENT & PLAN NOTE
Wt Readings from Last 3 Encounters:   10/28/24 91.5 kg (201 lb 11.5 oz)   10/22/24 95.6 kg (210 lb 12.8 oz)   09/03/24 90.2 kg (198 lb 12.8 oz)     Presented to ED with SOB, CP  BNP upon arrival 2,374  Recently seen by PCP on 10/22 for SOB and bilateral leg swelling and was suppose to increase Lasix dose to twice daily for 3 days; unsure compliance   Upon exam, SOB however 2L NC in place and O2 Sat 98%  Denies CP  CXR 10/25 showing: CHF with mild perihilar edema   Trace bilateral lower extremity edema noted  Continue Cardiac diet, fluid restriction 1800 mL   Daily weights

## 2024-10-28 NOTE — ASSESSMENT & PLAN NOTE
Lab Results   Component Value Date    HGBA1C 7.1 (H) 06/11/2024     Recent Labs     10/26/24  0534 10/26/24  0652 10/27/24  0455 10/27/24  0619 10/27/24  2040 10/28/24  0429 10/28/24  0618 10/28/24  1114   POCGLU  --    < >  --    < > 151*  --  124 209*   GLUC 148*  --  127  --   --  132  --   --     < > = values in this interval not displayed.     Hold home regimen while inpatient and resume on discharge  Diabetic diet  Insulin regimen  Glucose checks and Insulin correction ACHS  Goal -180 while admitted, adjusting insulin regimen as appropriate  Monitor for hypoglycemia and treat per protocol

## 2024-10-28 NOTE — ASSESSMENT & PLAN NOTE
BP is predominantly well-controlled, continue to monitor.  Continue amlodipine, lisinopril, Lopressor, Isordil, and Lasix.

## 2024-10-28 NOTE — PROGRESS NOTES
Cardiology Progress Note - Tom Stewart 88 y.o. male MRN: [id number]    Unit/Bed#: -01 Encounter: [id number]      Assessment & Plan  Acute on chronic heart failure with preserved ejection fraction (HCC)  Wt Readings from Last 3 Encounters:   10/28/24 91.5 kg (201 lb 11.5 oz)   10/22/24 95.6 kg (210 lb 12.8 oz)   09/03/24 90.2 kg (198 lb 12.8 oz)   Nearly euvolemic on exam.  I/O net -2.1 L, creatinine stable.  Continue IV Lasix 60 mg twice daily.  Recommend strict I/O's, daily weights, and sodium restriction.   Coronary artery disease involving native coronary artery of native heart without angina pectoris  Presented with chest pain/SOB.  Patient's daughter wishes to proceed with noninvasive medical management.  Continue ASA, Lopressor and Isordil.  Paroxysmal atrial fibrillation (HCC)  HR is predominantly well-controlled, continue Lopressor.  [id number] at least 6, continue Eliquis 2.5 mg bid (age, creatinine).  Aortic stenosis  Most recent TTE shows moderate aortic stenosis.  Family wishes to proceed with conservative management.  Primary hypertension  BP is predominantly well-controlled, continue to monitor.  Continue amlodipine, lisinopril, Lopressor, Isordil, and Lasix.  Mixed hyperlipidemia due to type 2 diabetes mellitus  (HCC)  Lab Results   Component Value Date    HGBA1C 7.1 (H) 06/11/2024     Recent Labs     10/27/24  1608 10/27/24  2040 10/28/24  0618 10/28/24  1114   POCGLU 160* 151* 124 209*     Blood Sugar Average: Last 72 hrs:  (P) [id number]  Not on statin due to reported history of intolerance.  Management per primary service.  Acute respiratory failure with hypoxia (HCC)  Management per primary team.  History of CVA (cerebrovascular accident)  On ASA and Eliquis.        Subjective:   Patient seen and examined.  No significant events overnight.  Patient resting in bed comfortably without any new complaints at this time.    Objective:     Vitals: Blood pressure 114/65, pulse (!)  51, temperature (!) 97.3 °F (36.3 °C), resp. rate 19, weight 91.5 kg (201 lb 11.5 oz), SpO2 99%., Body mass index is 28.94 kg/m².,   Orthostatic Blood Pressures      Flowsheet Row Most Recent Value   Blood Pressure 114/65 filed at 10/28/2024 1443              Intake/Output Summary (Last 24 hours) at 10/28/2024 1519  Last data filed at 10/28/2024 1300  Gross per 24 hour   Intake 660 ml   Output 1570 ml   Net -910 ml         Physical Exam:  GEN: Alert and in no acute distress.  Well appearing and well nourished.   HEENT: Sclera anicteric, conjunctivae pink, mucous membranes moist. Oropharynx clear.   NECK: Supple, no carotid bruits, no significant JVD. Trachea midline, no thyromegaly.   HEART: Regular rhythm, normal S1 and S2, 1/6 LINDSAY, no clicks, gallops or rubs. PMI nondisplaced, no thrills.   LUNGS: Clear to auscultation bilaterally; no wheezes, rales, or rhonchi. No increased work of breathing or signs of respiratory distress.   ABDOMEN: Soft, nontender, nondistended, normoactive bowel sounds.   EXTREMITIES: Skin warm and well perfused, no clubbing or cyanosis, trace BL LE edema.  NEURO: Normal speech. Mood and affect normal.   SKIN: Normal without suspicious lesions on exposed skin.        Medications:      Current Facility-Administered Medications:     acetaminophen (TYLENOL) tablet 650 mg, 650 mg, Oral, Q4H PRN, Candelaria Whiteside PA-C, 650 mg at 10/27/24 2103    amLODIPine (NORVASC) tablet 10 mg, 10 mg, Oral, Daily, TOMASZ Green-C, 10 mg at 10/28/24 0923    apixaban (ELIQUIS) tablet 2.5 mg, 2.5 mg, Oral, BID, TOMASZ Green-ETHEL, 2.5 mg at 10/28/24 0923    aspirin (ECOTRIN LOW STRENGTH) EC tablet 81 mg, 81 mg, Oral, Daily, TOMASZ Green-C, 81 mg at 10/28/24 0924    finasteride (PROSCAR) tablet 5 mg, 5 mg, Oral, Daily, Candelaria Whiteside PA-C, 5 mg at 10/28/24 0924    furosemide (LASIX) injection 60 mg, 60 mg, Intravenous, BID (diuretic), JALEEL Salazar, 60 mg at 10/28/24 0934     hydrALAZINE (APRESOLINE) injection 10 mg, 10 mg, Intravenous, Q6H PRN, Jake Bell DO    insulin lispro (HumALOG/ADMELOG) 100 units/mL subcutaneous injection 1-6 Units, 1-6 Units, Subcutaneous, TID AC, 2 Units at 10/28/24 1125 **AND** Fingerstick Glucose (POCT), , , TID AC, Candelaria Whiteside PA-C    isosorbide dinitrate (ISORDIL) tablet 10 mg, 10 mg, Oral, BID, TOMASZ Green-C, 10 mg at 10/28/24 0924    labetalol (NORMODYNE) injection 10 mg, 10 mg, Intravenous, Q6H PRN, Jake Bell,     lidocaine (LIDODERM) 5 % patch 1 patch, 1 patch, Topical, Daily, Candelaria Whiteside PA-C    lisinopril (ZESTRIL) tablet 20 mg, 20 mg, Oral, Daily, Candelaria Whiteside PA-C, 20 mg at 10/28/24 0925    metoprolol tartrate (LOPRESSOR) tablet 25 mg, 25 mg, Oral, Daily, Candelaria Whiteside PA-C, 25 mg at 10/28/24 0925    Insert peripheral IV, , , Once **AND** sodium chloride (PF) 0.9 % injection 3 mL, 3 mL, Intravenous, Q1H PRN, Candelaria Whiteside PA-C     Labs & Results:     Results from last 7 days   Lab Units 10/26/24  0046 10/25/24  2213 10/25/24  1932   HS TNI 0HR ng/L  --   --  67*   HS TNI 2HR ng/L  --  69*  --    HSTNI D2 ng/L  --  2  --    HS TNI 4HR ng/L 72*  --   --    HSTNI D4 ng/L 5  --   --      Results from last 7 days   Lab Units 10/28/24  0429 10/27/24  0455 10/26/24  0534   WBC Thousand/uL 6.95 6.65 6.33   HEMOGLOBIN g/dL 11.1* 10.7* 11.4*   HEMATOCRIT % 35.2* 34.7* 36.2*   PLATELETS Thousands/uL 172 161 172         Results from last 7 days   Lab Units 10/28/24  0429 10/27/24  0455 10/26/24  0534   POTASSIUM mmol/L 3.9 4.5 4.1   CHLORIDE mmol/L 97 100 99   CO2 mmol/L 30 28 28   BUN mg/dL 47* 47* 37*   CREATININE mg/dL 1.99* 2.00* 1.85*   CALCIUM mg/dL 9.2 9.3 9.4   ALK PHOS U/L  --   --  47   ALT U/L  --   --  23   AST U/L  --   --  19         Results from last 7 days   Lab Units 10/28/24  0429 10/27/24  0455 10/26/24  0534   MAGNESIUM mg/dL 2.0 2.0 2.2       Vitals: Blood pressure 114/65, pulse  (!) 51, temperature (!) 97.3 °F (36.3 °C), resp. rate 19, weight 91.5 kg (201 lb 11.5 oz), SpO2 99%., Body mass index is 28.94 kg/m².,   Orthostatic Blood Pressures      Flowsheet Row Most Recent Value   Blood Pressure 114/65 filed at 10/28/2024 1443            Systolic (24hrs), Av , Min:114 , Max:161     Diastolic (24hrs), Av, Min:65, Max:87        Intake/Output Summary (Last 24 hours) at 10/28/2024 1519  Last data filed at 10/28/2024 1300  Gross per 24 hour   Intake 660 ml   Output 1570 ml   Net -910 ml       Invasive Devices       Peripheral Intravenous Line  Duration             Peripheral IV 10/28/24 Right;Ventral (anterior) Hand <1 day                          BP Readings from Last 3 Encounters:   10/28/24 114/65   10/22/24 124/74   24 124/65      Wt Readings from Last 3 Encounters:   10/28/24 91.5 kg (201 lb 11.5 oz)   10/22/24 95.6 kg (210 lb 12.8 oz)   24 90.2 kg (198 lb 12.8 oz)

## 2024-10-28 NOTE — ASSESSMENT & PLAN NOTE
Patient at risk for delirium secondary to age, cognitive decline,  hospitalization, dehydration, immobility  Identify and treat underlying cause  Provide frequent redirection, reorientation, distraction techniques  Avoid deliriogenic medications such as tramadol, benzodiazepines, anticholinergics,  Benadryl  Treat pain, See geriatric pain protocol  Monitor for constipation and urinary retention  Encourage early and frequent moblization, OOB  Encourage Hydration/ Nutrition  Implement sleep hygiene, limit night time interuptions, group activities  Avoid physical restraints  Use chemical restraint only when other efforts have failed, recommend zyprexa 2.5mg IM q8h prn  Monitor Qtc, if greater than 500 do not use antipsychotic medication  If QTc greater than 460, monitor and replete and deficiency of  K and Mg, recheck EKG  EKG QTc 2023: 501; would refrain from antipsychotic medication unless recheck EKG

## 2024-10-28 NOTE — ED PROVIDER NOTES
Time reflects when diagnosis was documented in both MDM as applicable and the Disposition within this note       Time User Action Codes Description Comment    10/25/2024  8:18 PM Jonathan Meeks Add [I50.9] CHF (congestive heart failure) (HCC)     10/25/2024  8:18 PM Jeanna, Jonathan Add [R79.89] Elevated troponin     10/25/2024  8:19 PM StevoCandelaria yanes Add [I50.9] CHF exacerbation (HCC)     10/26/2024  4:12 PM Jake Bell Add [R54] Frailty     10/26/2024  4:14 PM Jake Bell Add [G30.8,  F02.80] Other Alzheimer's disease (HCC)           ED Disposition       ED Disposition   Admit    Condition   Stable    Date/Time   Fri Oct 25, 2024  8:18 PM    Comment   Case was discussed with Hospitalist and the patient's admission status was agreed to be Admission Status: inpatient status to the service of Dr. James .               Assessment & Plan       Medical Decision Making  Differential diagnosis includes but not limited to: Musculoskeletal chest pain, costochondritis, ACS, acute MI, pleurisy, pneumothorax, pleural effusion, pneumonia, pulmonary embolism, thoracic aortic dissection, mediastinitis, GERD/reflux gastritis/PUD.  Patient's co-morbidities and risk factors were considered in the work-up of this patient.    I have ordered CBC.  This was done to assess for leukocytosis versus leukopenia as possible indicators of infection viral versus bacterial.  Also included hemoglobin and hematocrit for assess for anemia.  And assess platelet numbers.  Thrombocytosis as a marker of inflammation and of course thrombocytopenia assess result of coagulation disorder or DIC.    Metabolic panel to assess for electrolyte deficiency, assess kidney function, and blood sugar levels to assess for hypoglycemia versus hyperglycemia as possible causes of the patient's symptoms.    Troponin - as a marker of cardiac injury.    BNP -elevated levels especially when compared to baseline levels would indicate possible heart failure or  right-sided heart strain.    Dimer test -given patient's presentation as a cause of chest pain or trouble breathing, and normal level with a low risk patient would indicate absence of pulmonary embolism.            Problems Addressed:  CHF (congestive heart failure) (HCC): acute illness or injury  Elevated troponin: acute illness or injury    Amount and/or Complexity of Data Reviewed  Labs: ordered. Decision-making details documented in ED Course.  Discussion of management or test interpretation with external provider(s): Patient has presented to the Emergency Department with exacerbation of chronic condition / pt with new illness-injury that may poses a threat to life or body function.  At least 3 different tests have been ordered on this patient AND reviewed the results.   Old laboratory data (of at least 2 tests) were reviewed from the medical records and compared to today's results  Discussion with patient and/or family members of results (normal and abnormal) and the implications for immediate and long term treatment/management.  Due to the high risk of morbidity further diagnostic testing and treatment is necessary.   I discussed the case with the hospitalist. We reviewed the HPI, pertinent PMH, ED course and management.   Hospitalist agreed with plan and will admit the patient to the hospital.    Medications  sodium chloride (PF) 0.9 % injection 3 mL (has no administration in time range)  amLODIPine (NORVASC) tablet 10 mg (10 mg Oral Given 10/27/24 0856)  apixaban (ELIQUIS) tablet 2.5 mg (2.5 mg Oral Given 10/27/24 1722)  aspirin (ECOTRIN LOW STRENGTH) EC tablet 81 mg (81 mg Oral Given 10/27/24 0856)  finasteride (PROSCAR) tablet 5 mg (5 mg Oral Given 10/27/24 0856)  isosorbide dinitrate (ISORDIL) tablet 10 mg (10 mg Oral Given 10/27/24 1722)   lisinopril (ZESTRIL) tablet 20 mg (20 mg Oral Given 10/27/24 0856)  metoprolol tartrate (LOPRESSOR) tablet 25 mg (25 mg Oral Given 10/27/24 0856)  insulin lispro  (HumALOG/ADMELOG) 100 units/mL subcutaneous injection 1-6 Units (1 Units Subcutaneous Given 10/27/24 1722)  acetaminophen (TYLENOL) tablet 650 mg (650 mg Oral Given 10/27/24 2103)  lidocaine (LIDODERM) 5 % patch 1 patch (1 patch Topical Not Given 10/27/24 0856)  furosemide (LASIX) injection 60 mg (60 mg Intravenous Given 10/27/24 1722)  hydrALAZINE (APRESOLINE) injection 10 mg (has no administration in time range)  labetalol (NORMODYNE) injection 10 mg (has no administration in time range)  furosemide (LASIX) injection 40 mg (40 mg Intravenous Given 10/25/24 2026)            Risk  Prescription drug management.  Decision regarding hospitalization.        ED Course as of 10/28/24 0345   Fri Oct 25, 2024   1934 EKG is atrial fibrillation rate of 64.  Normal axis.  No ST elevations or depressions.  There is a right bundle branch block which was present there before.  Also the atrial fibrillation is also old.   2003 hs TnI 0hr(!): 67   2003 BNP(!): 2,374       Medications   sodium chloride (PF) 0.9 % injection 3 mL (has no administration in time range)   furosemide (LASIX) injection 40 mg (40 mg Intravenous Given 10/25/24 2026)       ED Risk Strat Scores                           SBIRT 20yo+      Flowsheet Row Most Recent Value   Initial Alcohol Screen: US AUDIT-C     1. How often do you have a drink containing alcohol? 0 Filed at: 10/25/2024 1929   2. How many drinks containing alcohol do you have on a typical day you are drinking?  0 Filed at: 10/25/2024 1929   3a. Male UNDER 65: How often do you have five or more drinks on one occasion? 0 Filed at: 10/25/2024 1929   3b. FEMALE Any Age, or MALE 65+: How often do you have 4 or more drinks on one occassion? 0 Filed at: 10/25/2024 1929   Audit-C Score 0 Filed at: 10/25/2024 1929   MAVERICK: How many times in the past year have you...    Used an illegal drug or used a prescription medication for non-medical reasons? Never Filed at: 10/25/2024 1929                             History of Present Illness       Chief Complaint   Patient presents with    Chest Pain     Pt came in by EMS with c/o of Chest pain and SOB. Pt unclear to EMS of whether the the cp just started tonight or if its been going on for a couple of days. Pt states he has been becoming short of breath upon exertion for the past month. Pt is also unclear if he has a cardiac hx. Pts gf stated he had stents place in the past but the pt denies this.       Past Medical History:   Diagnosis Date    Acute deep vein thrombosis (DVT) of brachial vein of left upper extremity (HCC) 11/24/2017    Acute deep vein thrombosis (DVT) of left peroneal vein (HCC)     Acute ST elevation myocardial infarction (HCC)     Aortic valve stenosis     Arthritis     Atrial fibrillation (HCC)     Basilar artery stenosis     Basilar artery stenosis     Basilar artery stenosis 05/04/2020    MRA Head wo contrast (12/13/2019)  IMPRESSION:   Multifocal intracranial atherosclerotic disease with moderate to severe stenosis at the origin of the left M1 segment with additional atherosclerotic disease noted in the distal right M1 and proximal inferior left M2 branches.   Moderate to severe stenosis in the proximal and mid basilar artery with nonvisualization of the right intradural vertebral    Benign prostatic hyperplasia with lower urinary tract symptoms     CAD (coronary artery disease)     CAD in native artery 11/24/2017    Underwent cardiac catheterization on 1/30/2020 and had mid and distal LAD stent placed  Cardiac PCI (1/30/2020)  SUMMARY   CORONARY CIRCULATION: Mid LAD: There was a 90 % stenosis at the site of a prior stent. Distal LAD: There was a 90 % stenosis at the site of a prior stent. Left posterior descending artery: There was a diffuse 50 % stenosis.   1ST LESION INTERVENTIONS: A balloon angioplasty wit    Cerebrovascular small vessel disease 11/12/2020    Chronic kidney disease     Closed fracture of multiple ribs of left side with  routine healing 09/03/2020    Coronary artery disease     Dementia (HCC)     Diabetes mellitus (HCC)     DM2 (diabetes mellitus, type 2) ([facility])     Elevated troponin 07/19/2021    Herpes zoster without complication 07/28/2021    History of CVA (cerebrovascular accident) 02/21/2019    History of DVT of peroneal vein left lower extremity 12/16/2019    History of transfusion     HLD (hyperlipidemia)     HTN (hypertension)     Infected sebaceous cyst of skin 06/08/2021    Lump in chest 06/01/2021    Middle cerebral artery stenosis     Mild to moderate aortic stenosis 10/09/2018    ECHO (7/2020)  AORTIC VALVE: Leaflets exhibited moderately increased thickness and moderate calcification. Transaortic velocity was increased due to valvular stenosis. There was mild to moderate stenosis. RICHY 1.4cm, mean pressure gradient 11mmHg, peak velocity 2.15m/s There was mild regurgitation.    Myocardial infarction ([facility])     Near syncope 07/19/2021    Other proteinuria 10/08/2019    Proteinuria     Rib fractures (right) 07/31/2020    Stroke ([facility])     Symptomatic bradycardia     Transient cerebral ischemia     Vitamin D deficiency       Past Surgical History:   Procedure Laterality Date    CARDIAC CATHETERIZATION      Outcome: successful; last assessed: 02/03/2015    CARDIAC CATHETERIZATION  11/26/2019    CORONARY ANGIOPLASTY WITH STENT PLACEMENT  2008    stent to LAD     CORONARY ARTERY BYPASS GRAFT      HAND SURGERY      thumb    HIP HARDWARE REMOVAL      JOINT REPLACEMENT      TOTAL HIP ARTHROPLASTY Right     TOTAL HIP ARTHROPLASTY Bilateral       Family History   Problem Relation Age of Onset    Emphysema Father     Emphysema Sister       Social History     Tobacco Use    Smoking status: Never     Passive exposure: Never    Smokeless tobacco: Never   Vaping Use    Vaping status: Never Used   Substance Use Topics    Alcohol use: Never    Drug use: Never      E-Cigarette/Vaping    E-Cigarette Use Never User       E-Cigarette/Vaping  Substances    Nicotine No     THC No     CBD No     Flavoring No     Other No     Unknown No       I have reviewed and agree with the history as documented.     Tom Stewart is a 88 y.o.  year old male  Past Medical History:  11/24/2017: Acute deep vein thrombosis (DVT) of brachial vein of left   upper extremity (HCC)  No date: Acute deep vein thrombosis (DVT) of left peroneal vein (HCC)  No date: Acute ST elevation myocardial infarction ([facility])  No date: Aortic valve stenosis  No date: Arthritis  No date: Atrial fibrillation ([facility])  No date: Basilar artery stenosis  No date: Basilar artery stenosis  05/04/2020: Basilar artery stenosis      Comment:  MRA Head wo contrast (12/13/2019)  IMPRESSION:                  Multifocal intracranial atherosclerotic disease with                moderate to severe stenosis at the origin of the left M1                segment with additional atherosclerotic disease noted in                the distal right M1 and proximal inferior left M2                branches.   Moderate to severe stenosis in the proximal                and mid basilar artery with nonvisualization of the right               intradural vertebral  No date: Benign prostatic hyperplasia with lower urinary tract   symptoms  No date: CAD (coronary artery disease)  11/24/2017: CAD in native artery      Comment:  Underwent cardiac catheterization on 1/30/2020 and had                mid and distal LAD stent placed  Cardiac PCI (1/30/2020)                SUMMARY   CORONARY CIRCULATION: Mid LAD: There was a 90 %               stenosis at the site of a prior stent. Distal LAD: There                was a 90 % stenosis at the site of a prior stent. Left                posterior descending artery: There was a diffuse 50 %                stenosis.   1ST LESION INTERVENTIONS: A balloon                angioplasty wit  11/12/2020: Cerebrovascular small vessel disease  No date: Chronic kidney disease  09/03/2020: Closed fracture of  multiple ribs of left side with   routine healing  No date: Coronary artery disease  No date: Dementia ([facility])  No date: Diabetes mellitus ([facility])  No date: DM2 (diabetes mellitus, type 2) ([facility])  07/19/2021: Elevated troponin  07/28/2021: Herpes zoster without complication  02/21/2019: History of CVA (cerebrovascular accident)  12/16/2019: History of DVT of peroneal vein left lower extremity  No date: History of transfusion  No date: HLD (hyperlipidemia)  No date: HTN (hypertension)  06/08/2021: Infected sebaceous cyst of skin  06/01/2021: Lump in chest  No date: Middle cerebral artery stenosis  10/09/2018: Mild to moderate aortic stenosis      Comment:  ECHO (7/2020)  AORTIC VALVE: Leaflets exhibited                moderately increased thickness and moderate                calcification. Transaortic velocity was increased due to                valvular stenosis. There was mild to moderate stenosis.                RICHY 1.4cm, mean pressure gradient 11mmHg, peak velocity                2.15m/s There was mild regurgitation.  No date: Myocardial infarction ([facility])  07/19/2021: Near syncope  10/08/2019: Other proteinuria  No date: Proteinuria  07/31/2020: Rib fractures (right)  No date: Stroke ([facility])  No date: Symptomatic bradycardia  No date: Transient cerebral ischemia  No date: Vitamin D deficiency  Social History    Tobacco Use      Smoking status: Never        Passive exposure: Never      Smokeless tobacco: Never    Vaping Use      Vaping status: Never Used    Alcohol use: Never    Drug use: Never    Patient presents with:  Chest Pain: Pt came in by EMS with c/o of Chest pain and SOB. Pt unclear to EMS of whether the the cp just started tonight or if its been going on for a couple of days. Pt states he has been becoming short of breath upon exertion for the past month. Pt is also unclear if he has a cardiac hx. Pts gf stated he had stents place in the past but the pt denies this.      History obtained directly from the  PATIENT              Chest Pain  Associated symptoms: shortness of breath    Associated symptoms: no abdominal pain, no back pain, no cough, no fever, no palpitations and not vomiting        Review of Systems   Constitutional:  Negative for chills and fever.   HENT:  Negative for ear pain and sore throat.    Eyes:  Negative for pain and visual disturbance.   Respiratory:  Positive for shortness of breath. Negative for cough.    Cardiovascular:  Positive for chest pain. Negative for palpitations.   Gastrointestinal:  Negative for abdominal pain and vomiting.   Genitourinary:  Negative for dysuria and hematuria.   Musculoskeletal:  Negative for arthralgias and back pain.   Skin:  Negative for color change and rash.   Neurological:  Negative for seizures and syncope.   All other systems reviewed and are negative.          Objective       ED Triage Vitals   Temperature Pulse Blood Pressure Respirations SpO2 Patient Position - Orthostatic VS   10/25/24 1929 10/25/24 1929 10/25/24 1929 10/25/24 1929 10/25/24 1929 --   97.9 °F (36.6 °C) 68 148/75 (!) 24 (!) 85 %       Temp Source Heart Rate Source BP Location FiO2 (%) Pain Score    10/25/24 1929 -- 10/25/24 1929 -- 10/25/24 2136    Oral  Right arm  No Pain      Vitals      Date and Time Temp Pulse SpO2 Resp BP Pain Score FACES Pain Rating User   10/27/24 2339 -- -- -- 18 -- -- -- MSD   10/27/24 2339 98.2 °F (36.8 °C) 64 98 % -- 150/83 -- -- DII   10/27/24 2103 -- -- -- -- -- 6 -- MSD   10/27/24 1947 -- -- -- -- -- No Pain -- MSD   10/27/24 1532 97.9 °F (36.6 °C) 51 97 % 18 121/75 -- -- DII   10/27/24 1111 -- -- -- -- -- No Pain -- EO   10/27/24 1035 -- 52 97 % -- -- -- -- DII   10/27/24 1035 -- -- -- 17 140/112 -- -- DII   10/27/24 0707 97.5 °F (36.4 °C) -- -- 17 144/101 -- -- DII   10/27/24 0215 98.2 °F (36.8 °C) 70 88 % -- 151/87 -- -- DII   10/26/24 2225 98 °F (36.7 °C) 64 93 % -- 141/81 -- -- DII   10/26/24 2000 -- -- -- -- -- No Pain -- OB   10/26/24 1852 97.6 °F  (36.4 °C) 61 95 % -- 127/73 -- -- DII   10/26/24 1531 -- 60 95 % 17 136/69 -- -- DII   10/26/24 1458 -- -- -- -- -- No Pain -- EO   10/26/24 0750 -- 64 94 % 16 148/92 -- -- DII   10/26/24 0324 97.5 °F (36.4 °C) 72 91 % 18 156/93 -- -- DII   10/25/24 2332 97.4 °F (36.3 °C) 66 94 % 18 143/88 -- -- DII   10/25/24 2200 -- -- -- -- -- No Pain -- CM   10/25/24 2136 -- -- -- 22 -- No Pain -- BC   10/25/24 2136 97.6 °F (36.4 °C) 67 93 % -- 136/83 -- -- DII   10/25/24 2030 -- 66 93 % 24 166/79 -- -- BK   10/25/24 2000 -- 65 94 % 20 156/82 -- -- BK   10/25/24 1929 97.9 °F (36.6 °C) 68 85 % 24 148/75 -- -- BK            Physical Exam  Vitals and nursing note reviewed.   Constitutional:       General: He is not in acute distress.     Appearance: He is well-developed and normal weight.   HENT:      Head: Normocephalic and atraumatic.   Eyes:      Conjunctiva/sclera: Conjunctivae normal.   Cardiovascular:      Rate and Rhythm: Normal rate and regular rhythm.      Heart sounds: No murmur heard.  Pulmonary:      Effort: Pulmonary effort is normal. No respiratory distress.      Breath sounds: Normal breath sounds.   Abdominal:      Palpations: Abdomen is soft.      Tenderness: There is no abdominal tenderness.   Musculoskeletal:         General: No swelling.      Cervical back: Neck supple.      Right lower leg: No tenderness. No edema.      Left lower leg: No tenderness. No edema.   Skin:     General: Skin is warm and dry.      Capillary Refill: Capillary refill takes less than 2 seconds.   Neurological:      General: No focal deficit present.      Mental Status: He is alert and oriented to person, place, and time.   Psychiatric:         Mood and Affect: Mood normal.         Results Reviewed       Procedure Component Value Units Date/Time    Comprehensive metabolic panel [[id number]]  (Abnormal) Collected: 10/26/24 0536    Lab Status: Final result Specimen: Blood from Arm, Right Updated: 10/26/24 0642     Sodium 135 mmol/L       Potassium 4.1 mmol/L      Chloride 99 mmol/L      CO2 28 mmol/L      ANION GAP 8 mmol/L      BUN 37 mg/dL      Creatinine 1.85 mg/dL      Glucose 148 mg/dL      Calcium 9.4 mg/dL      AST 19 U/L      ALT 23 U/L      Alkaline Phosphatase 47 U/L      Total Protein 7.6 g/dL      Albumin 4.2 g/dL      Total Bilirubin 0.90 mg/dL      eGFR 32 ml/min/1.73sq m     Narrative:      National Kid[employer] Foundation guidelines for Chronic Kidney Disease (CKD):     Stage 1 with normal or high GFR (GFR > 90 mL/min/1.73 square meters)    Stage 2 Mild CKD (GFR = 60-89 mL/min/1.73 square meters)    Stage 3A Moderate CKD (GFR = 45-59 mL/min/1.73 square meters)    Stage 3B Moderate CKD (GFR = 30-44 mL/min/1.73 square meters)    Stage 4 Severe CKD (GFR = 15-29 mL/min/1.73 square meters)    Stage 5 End Stage CKD (GFR <15 mL/min/1.73 square meters)  Note: GFR calculation is accurate only with a steady state creatinine    Magnesium [[id number]]  (Normal) Collected: 10/26/24 0534    Lab Status: Final result Specimen: Blood from Arm, Right Updated: 10/26/24 0642     Magnesium 2.2 mg/dL     Phosphorus [[id number]]  (Normal) Collected: 10/26/24 0534    Lab Status: Final result Specimen: Blood from Arm, Right Updated: 10/26/24 0642     Phosphorus 3.9 mg/dL     CBC (With Platelets) [[id number]]  (Abnormal) Collected: 10/26/24 0534    Lab Status: Final result Specimen: Blood from Arm, Right Updated: 10/26/24 0618     WBC 6.33 Thousand/uL      RBC 3.60 Million/uL      Hemoglobin 11.4 g/dL      Hematocrit 36.2 %       fL      MCH 31.7 pg      MCHC 31.5 g/dL      RDW 15.2 %      Platelets 172 Thousands/uL      MPV 10.4 fL     HS Troponin I 4hr [[id number]]  (Abnormal) Collected: 10/26/24 0046    Lab Status: Final result Specimen: Blood from Arm, Right Updated: 10/26/24 0121     hs TnI 4hr 72 ng/L      Delta 4hr hsTnI 5 ng/L     HS Troponin I 2hr [[id number]]  (Abnormal) Collected: 10/25/24 6865    Lab Status: Final result Specimen: Blood from  Arm, Right Updated: 10/25/24 2241     hs TnI 2hr 69 ng/L      Delta 2hr hsTnI 2 ng/L     HS Troponin 0hr (reflex protocol) [[id number]]  (Abnormal) Collected: 10/25/24 1932    Lab Status: Final result Specimen: Blood from Arm, Left Updated: 10/25/24 2002     hs TnI 0hr 67 ng/L     B-Type Natriuretic Peptide(BNP) [[id number]]  (Abnormal) Collected: 10/25/24 1932    Lab Status: Final result Specimen: Blood from Arm, Left Updated: 10/25/24 2001     BNP 2,374 pg/mL     Basic metabolic panel [[id number]]  (Abnormal) Collected: 10/25/24 1932    Lab Status: Final result Specimen: Blood from Arm, Left Updated: 10/25/24 1957     Sodium 134 mmol/L      Potassium 4.3 mmol/L      Chloride 98 mmol/L      CO2 28 mmol/L      ANION GAP 8 mmol/L      BUN 37 mg/dL      Creatinine 1.94 mg/dL      Glucose 202 mg/dL      Calcium 9.3 mg/dL      eGFR 30 ml/min/1.73sq m     Narrative:      National Kidney Disease Foundation guidelines for Chronic Kidney Disease (CKD):     Stage 1 with normal or high GFR (GFR > 90 mL/min/1.73 square meters)    Stage 2 Mild CKD (GFR = 60-89 mL/min/1.73 square meters)    Stage 3A Moderate CKD (GFR = 45-59 mL/min/1.73 square meters)    Stage 3B Moderate CKD (GFR = 30-44 mL/min/1.73 square meters)    Stage 4 Severe CKD (GFR = 15-29 mL/min/1.73 square meters)    Stage 5 End Stage CKD (GFR <15 mL/min/1.73 square meters)  Note: GFR calculation is accurate only with a steady state creatinine    CBC and differential [[id number]]  (Abnormal) Collected: 10/25/24 1932    Lab Status: Final result Specimen: Blood from Arm, Left Updated: 10/25/24 1939     WBC 5.84 Thousand/uL      RBC 3.48 Million/uL      Hemoglobin 10.7 g/dL      Hematocrit 34.0 %      MCV 98 fL      MCH 30.7 pg      MCHC 31.5 g/dL      RDW 15.3 %      MPV 10.0 fL      Platelets 160 Thousands/uL      nRBC 0 /100 WBCs      Segmented % 80 %      Immature Grans % 0 %      Lymphocytes % 10 %      Monocytes % 7 %      Eosinophils Relative 2 %      Basophils  Relative 1 %      Absolute Neutrophils 4.70 Thousands/µL      Absolute Immature Grans 0.02 Thousand/uL      Absolute Lymphocytes 0.59 Thousands/µL      Absolute Monocytes 0.39 Thousand/µL      Eosinophils Absolute 0.10 Thousand/µL      Basophils Absolute 0.04 Thousands/µL             X-ray chest 1 view portable   Final Interpretation by Benjamin Dye MD (10/26 0848)      CHF with mild perihilar edema and suggestion of minimal pleural effusions. Cardiomegaly.            Workstation performed: [id number]             Procedures    ED Medication and Procedure Management   Prior to Admission Medications   Prescriptions Last Dose Informant Patient Reported? Taking?   Blood Glucose Monitoring Suppl (ONE TOUCH ULTRA MINI) w/Device KIT Unknown Child No No   Sig: Use daily   Omega-3 Fatty Acids (FISH OIL CONCENTRATE PO) 10/24/2024 Child Yes Yes   Sig: Take 1 tablet by mouth daily   acetaminophen (TYLENOL) 500 mg tablet Unknown Child Yes No   Sig: Take 500 mg by mouth every 6 (six) hours as needed for mild pain   amLODIPine (NORVASC) 10 mg tablet 10/25/2024  No Yes   Sig: TAKE 1 TABLET BY MOUTH DAILY   apixaban (ELIQUIS) 2.5 mg 10/25/2024 Child No Yes   Sig: Take 1 tablet (2.5 mg total) by mouth 2 (two) times a day   aspirin (ECOTRIN LOW STRENGTH) 81 mg EC tablet 10/24/2024 Child No Yes   Sig: Take 1 tablet (81 mg total) by mouth daily   cholecalciferol (VITAMIN D3) 1,000 units tablet  Child Yes No   Sig: Take 1,000 Units by mouth daily   finasteride (PROSCAR) 5 mg tablet 10/24/2024 Child No Yes   Sig: TAKE 1 TABLET BY MOUTH DAILY   furosemide (LASIX) 40 mg tablet 10/25/2024 Child No Yes   Sig: TAKE 1 TABLET BY MOUTH DAILY   gabapentin (NEURONTIN) 100 mg capsule 10/25/2024  No Yes   Sig: TAKE 1 CAPSULE BY MOUTH TWICE  DAILY   glipiZIDE (GLUCOTROL XL) 2.5 mg 24 hr tablet 10/25/2024 Child No Yes   Sig: TAKE 1 TABLET BY MOUTH DAILY   glucose blood (OneTouch Ultra) test strip Unknown Child No No   Sig: Check blood sugars  once daily. Please substitute with appropriate alternative as covered by patient's insurance. Dx: E11.65   isosorbide dinitrate (ISORDIL) 10 mg tablet 10/25/2024 Child No Yes   Sig: TAKE 1 TABLET BY MOUTH TWICE  DAILY   lisinopril (ZESTRIL) 20 mg tablet 10/25/2024 Child No Yes   Sig: TAKE 1 TABLET BY MOUTH DAILY   metoprolol tartrate (LOPRESSOR) 25 mg tablet 10/25/2024 Child No Yes   Si/2 tab once a day   vitamin B-12 (CYANOCOBALAMIN) 100 MCG tablet 10/24/2024 Child Yes Yes   Sig: Take 1,000 mcg by mouth daily      Facility-Administered Medications: None     Current Discharge Medication List        CONTINUE these medications which have NOT CHANGED    Details   amLODIPine (NORVASC) 10 mg tablet TAKE 1 TABLET BY MOUTH DAILY  Qty: 90 tablet, Refills: 3    Comments: Please send a replace/new response with 90-Day Supply if appropriate to maximize member benefit. Requesting 1 year supply.  Associated Diagnoses: Essential hypertension      apixaban (ELIQUIS) 2.5 mg Take 1 tablet (2.5 mg total) by mouth 2 (two) times a day  Qty: 180 tablet, Refills: 3    Associated Diagnoses: Paroxysmal atrial fibrillation (HCC)      aspirin (ECOTRIN LOW STRENGTH) 81 mg EC tablet Take 1 tablet (81 mg total) by mouth daily    Associated Diagnoses: Coronary artery disease involving native coronary artery of native heart without angina pectoris      finasteride (PROSCAR) 5 mg tablet TAKE 1 TABLET BY MOUTH DAILY  Qty: 90 tablet, Refills: 3    Comments: Please send a replace/new response with 90-Day Supply if appropriate to maximize member benefit. Requesting 1 year supply.  Associated Diagnoses: BPH with obstruction/lower urinary tract symptoms      furosemide (LASIX) 40 mg tablet TAKE 1 TABLET BY MOUTH DAILY  Qty: 90 tablet, Refills: 3    Comments: Please send a replace/new response with 90-Day Supply if appropriate to maximize member benefit. Requesting 1 year supply.  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 Requesting 1 year supply.  Associated Diagnoses: Chronic pain syndrome      glipiZIDE (GLUCOTROL XL) 2.5 mg 24 hr tablet TAKE 1 TABLET BY MOUTH DAILY  Qty: 90 tablet, Refills: 3    Comments: Please send a replace/new response with 90-Day Supply if appropriate to maximize member benefit. Requesting 1 year supply.  Associated Diagnoses: Type 2 diabetes mellitus with stage 3b chronic kidney disease, without long-term current use of insulin ([facility])      isosorbide dinitrate (ISORDIL) 10 mg tablet TAKE 1 TABLET BY MOUTH TWICE  DAILY  Qty: 180 tablet, Refills: 3    Comments: Please send a replace/new response with 90-Day Supply if appropriate to maximize member benefit. Requesting 1 year supply.  Associated Diagnoses: Coronary artery disease involving native coronary artery of native heart without angina pectoris      lisinopril (ZESTRIL) 20 mg tablet TAKE 1 TABLET BY MOUTH DAILY  Qty: 90 tablet, Refills: 3    Comments: Please send a replace/new response with 90-Day Supply if appropriate to maximize member benefit. Requesting 1 year supply.  Associated Diagnoses: Essential hypertension      metoprolol tartrate (LOPRESSOR) 25 mg tablet 1/2 tab once a day    Comments: Please send a replace/new response with 90-Day Supply if appropriate to maximize member benefit. Requesting 1 year supply.  Associated Diagnoses: Coronary artery disease involving native coronary artery of native heart without angina pectoris      Omega-3 Fatty Acids (FISH OIL CONCENTRATE PO) Take 1 tablet by mouth daily      vitamin B-12 (CYANOCOBALAMIN) 100 MCG tablet Take 1,000 mcg by mouth daily      acetaminophen (TYLENOL) 500 mg tablet Take 500 mg by mouth  every 6 (six) hours as needed for mild pain      Blood Glucose Monitoring Suppl (ONE TOUCH ULTRA MINI) w/Device KIT Use daily  Qty: 1 kit, Refills: 0    Associated Diagnoses: Type 2 diabetes mellitus with stage 3b chronic kidney disease, without long-term current use of insulin (HCC)      cholecalciferol (VITAMIN D3) 1,000 units tablet Take 1,000 Units by mouth daily      glucose blood (OneTouch Ultra) test strip Check blood sugars once daily. Please substitute with appropriate alternative as covered by patient's insurance. Dx: E11.65  Qty: 100 each, Refills: 3    Associated Diagnoses: Type 2 diabetes mellitus with stage 3b chronic kidney disease, without long-term current use of insulin ([facility])           No discharge procedures on file.  ED SEPSIS DOCUMENTATION   Time reflects when diagnosis was documented in both MDM as applicable and the Disposition within this note       Time User Action Codes Description Comment    10/25/2024  8:18 PM Jonathan Meeks [I50.9] CHF (congestive heart failure) (HCC)     10/25/2024  8:18 PM Jonathan Meeks [R79.89] Elevated troponin     10/25/2024  8:19 PM Candelaria Whiteside Add [I50.9] CHF exacerbation (HCC)     10/26/2024  4:12 PM Jake Bell [R54] Frailty     10/26/2024  4:14 PM Jake Bell [G30.8,  F02.80] Other Alzheimer's disease (HCC)                  Jonathan Meeks MD  10/28/24 0345

## 2024-10-28 NOTE — PROGRESS NOTES
Patient:    MRN:  [id number]    Aidin Request ID:  [id number]    Level of care reserved:  Skilled Nursing Facility    Partner Reserved:  [facility], [place] [address] [phone]    Clinical needs requested:    Geography searched:  45 miles around [phone]    Start of Service:    Request sent:  9:07am EDT on 10/28/2024 by Nay Russell    Partner reserved:  12:00pm EDT on 10/28/2024 by Nay Russell    Choice list shared:  11:41am EDT on 10/28/2024 by Nay Russell

## 2024-10-28 NOTE — ASSESSMENT & PLAN NOTE
Frequent falls within the last year per daughter, however did not seek medical attention  Area rugs, steps to enter home, gait instability, dog as tripping hazards within home  [place] fall precautions   Assess patient frequently for physical needs, encourage use of assistant devices as needed and directed by PT/OT  Identify cognitive and physical deficits and behaviors that affect risk of falls  Consider moving patient closer to nursing station to monitor more closely for impulsive behavior which may increase risk of falls  Educate patient/family on patient safety including physical limitations and importance of using call bell for assistance   Modify environment to reduce risk of injury including disconnecting from pole when not in use, ensuring adequate lighting in room and restroom, ensuring that path to restroom is clear and free of trip hazards  PT/OT consulted to assist with strengthening/mobility and assist with discharge planning to appropriate level of care

## 2024-10-28 NOTE — ASSESSMENT & PLAN NOTE
Blood Pressure: 114/65    Plan:  Continue home meds  Monitor blood pressure  PRN IV Labetalol and Hydralazine for SBP > 160

## 2024-10-28 NOTE — ASSESSMENT & PLAN NOTE
Clinical Frail Scale: 5/6- Mildly/Moderately Frail  More evident slowing, needs help high order IADLs (transport, bills, medications)  Progressively impairs shopping and walking outside alone, meal prep and housework  Daughter set up alarm system for medication administration, however patient not compliant  Noted days of medications still being in pill box  Home safety: continues to use riding lawnmowers, however uses RW/gait instability noted  Several area rugs in home  No shower grab bars, chair per patient   Albumin: 4.2

## 2024-10-28 NOTE — PHYSICAL THERAPY NOTE
Physical Therapy Evaluation     Patient's Name: Tom Stewart    Admitting Diagnosis  CHF (congestive heart failure) ([facility]) [I50.9]  Chest pain [R07.9]  Elevated troponin [R79.89]  CHF exacerbation ([facility]) [I50.9]    Problem List  Patient Active Problem List   Diagnosis    Mixed hyperlipidemia due to type 2 diabetes mellitus  ([facility])    Coronary artery disease involving native coronary artery of native heart without angina pectoris    History of CVA (cerebrovascular accident)    Paroxysmal atrial fibrillation ([facility])    Type 2 diabetes mellitus with stage 4 chronic kidney disease, without long-term current use of insulin (HCC)    Fall    Ambulatory dysfunction    Other proteinuria    Vitamin D deficiency    Gait instability    Hearing loss    Stage 4 chronic kidney disease (HCC)    Primary hypertension    Acute on chronic heart failure with preserved ejection fraction (HCC)    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 (HCC)    Other Alzheimer's disease ([facility])    Sebaceous cyst    Nose ulceration    Acute respiratory failure with hypoxia ([facility])     Past Medical History  Past Medical History:   Diagnosis Date    Acute deep vein thrombosis (DVT) of brachial vein of left upper extremity ([facility]) 11/24/2017    Acute deep vein thrombosis (DVT) of left peroneal vein ([facility])     Acute ST elevation myocardial infarction ([facility])     Aortic valve stenosis     Arthritis     Atrial fibrillation (HCC)     Basilar artery stenosis     Basilar artery stenosis     Basilar artery stenosis 05/04/2020    MRA Head wo contrast (12/13/2019)  IMPRESSION:   Multifocal intracranial atherosclerotic disease with moderate to severe stenosis at the origin of the left M1 segment with additional atherosclerotic disease noted in the distal right M1 and proximal inferior left M2 branches.   Moderate to severe stenosis in the proximal and mid basilar artery with nonvisualization of the right intradural vertebral    Benign prostatic  hyperplasia with lower urinary tract symptoms     CAD (coronary artery disease)     CAD in native artery 11/24/2017    Underwent cardiac catheterization on 1/30/2020 and had mid and distal LAD stent placed  Cardiac PCI (1/30/2020)  SUMMARY   CORONARY CIRCULATION: Mid LAD: There was a 90 % stenosis at the site of a prior stent. Distal LAD: There was a 90 % stenosis at the site of a prior stent. Left posterior descending artery: There was a diffuse 50 % stenosis.   1ST LESION INTERVENTIONS: A balloon angioplasty wit    Cerebrovascular small vessel disease 11/12/2020    Chronic kidney disease     Closed fracture of multiple ribs of left side with routine healing 09/03/2020    Coronary artery disease     Dementia (HCC)     Diabetes mellitus (HCC)     DM2 (diabetes mellitus, type 2) ([facility])     Elevated troponin 07/19/2021    Herpes zoster without complication 07/28/2021    History of CVA (cerebrovascular accident) 02/21/2019    History of DVT of peroneal vein left lower extremity 12/16/2019    History of transfusion     HLD (hyperlipidemia)     HTN (hypertension)     Infected sebaceous cyst of skin 06/08/2021    Lump in chest 06/01/2021    Middle cerebral artery stenosis     Mild to moderate aortic stenosis 10/09/2018    ECHO (7/2020)  AORTIC VALVE: Leaflets exhibited moderately increased thickness and moderate calcification. Transaortic velocity was increased due to valvular stenosis. There was mild to moderate stenosis. RICHY 1.4cm, mean pressure gradient 11mmHg, peak velocity 2.15m/s There was mild regurgitation.    Myocardial infarction ([facility])     Near syncope 07/19/2021    Other proteinuria 10/08/2019    Proteinuria     Rib fractures (right) 07/31/2020    Stroke ([facility])     Symptomatic bradycardia     Transient cerebral ischemia     Vitamin D deficiency      Past Surgical History  Past Surgical History:   Procedure Laterality Date    CARDIAC CATHETERIZATION      Outcome: successful; last assessed: 02/03/2015    CARDIAC  "CATHETERIZATION  11/26/2019    CORONARY ANGIOPLASTY WITH STENT PLACEMENT  2008    stent to LAD     CORONARY ARTERY BYPASS GRAFT      HAND SURGERY      thumb    HIP HARDWARE REMOVAL      JOINT REPLACEMENT      TOTAL HIP ARTHROPLASTY Right     TOTAL HIP ARTHROPLASTY Bilateral       10/28/24 0827   PT Last Visit   PT Visit Date 10/28/24   Note Type   Note type Evaluation and Treatment   Pain Assessment   Pain Assessment Tool 0-10   Pain Score No Pain   Restrictions/Precautions   Weight Bearing Precautions Per Order No   Other Precautions Cognitive;Chair Alarm;Bed Alarm;O2;Fall Risk  (+2L O2 via NC)   Home Living   Type of Home House   Home Layout One level;Able to live on main level with bedroom/bathroom;Performs ADLs on one level;Stairs to enter with rails  (4 DILMA)   Bathroom Shower/Tub Walk-in shower   Bathroom Toilet Standard   Bathroom Equipment Grab bars in shower   Bathroom Accessibility Accessible   Home Equipment Other (Comment)  (none per patient)   Additional Comments Pt ambulates without an AD.   Prior Function   Level of [place] Independent with functional mobility;Independent with ADLs;Needs assistance with IADLS   Lives With Significant other  (\"[place]\")   Receives Help From Family;Friend(s)   IADLs Family/Friend/Other provides transportation;Family/Friend/Other provides meals;Family/Friend/Other provides medication management   Falls in the last 6 months 0  (pt denies)   Vocational Retired   Comments Pt is a questionable historian. Information regarding home setup and PLOF obtained from patient and chart review. Further clarification is required.   General   Family/Caregiver Present No   Cognition   Overall Cognitive Status Impaired   Arousal/Participation Alert   Orientation Level Oriented to person;Oriented to place;Disoriented to time;Disoriented to situation  (place: \"hospital\")   Memory Decreased recall of recent events;Decreased short term memory   Following Commands Follows one step commands " "without difficulty   Comments Pt agreeable to PT.   Subjective   Subjective \"I can walk.\"   RLE Assessment   RLE Assessment X   Strength RLE   RLE Overall Strength 4-/5   LLE Assessment   LLE Assessment X   Strength LLE   LLE Overall Strength 4-/5   Vision-Basic Assessment   Current Vision Wears glasses only for reading   Light Touch   RLE Light Touch Grossly intact   LLE Light Touch Grossly intact   Bed Mobility   Additional Comments Pt was received seated at EOB in NAD with nursing staff present.   Transfers   Sit to Stand 4  Minimal assistance   Additional items Assist x 1;Increased time required;Verbal cues   Stand to Sit 4  Minimal assistance   Additional items Assist x 1;Armrests;Increased time required;Verbal cues   Ambulation/Elevation   Gait pattern Decreased toe off;Decreased heel strike;Decreased hip extension;Short stride;Foward flexed   Gait Assistance 4  Minimal assist   Additional items Assist x 1;Verbal cues   Assistive Device Rolling walker   Distance 10 feet x 1 trial; 20 feet x 1 trial   Balance   Static Sitting Fair +   Dynamic Sitting Fair   Static Standing Fair -   Dynamic Standing Poor +   Ambulatory Poor +   Endurance Deficit   Endurance Deficit Yes   Endurance Deficit Description decreased activity tolerance; pt requiring supplemental oxygen; pt was received on 2L O2 via NC; SpO2 decreased to 90% with functional mobility; increased to 97% with a seated rest break   Activity Tolerance   Activity Tolerance Patient tolerated treatment well   Medical Staff Made Aware PT student Phil   Assessment   Prognosis Good   Problem List Decreased strength;Decreased endurance;Impaired balance;Decreased mobility;Decreased cognition   Assessment Pt is 88 year old male seen for PT evaluation s/p admit to [facility] on 10/25/2024 with Acute on chronic heart failure with preserved ejection fraction (HCC). PT consulted to assess pt's functional mobility and discharge needs. Order placed for PT evaluation " and treatment. Comorbidities affecting pt's physical performance at time of assessment include mixed hyperlipidemia due to type 2 DM, CAD, history of CVA, paroxysmal atrial fibrillation, stage 4 CKD, ambulatory dysfunction, primary hypertension, Alzheimer's disease, and acute respiratory failure with hypoxia. Prior to hospitalization, pt was independent with all functional mobility without an AD. Pt ambulates unrestricted distances on all terrain and elevations. Pt resides with his significant other, in a one level house with four steps to enter. Pt is a questionable historian. Information regarding home setup and prior level of function were obtained from patient and chart review and further clarification is required. Personal factors affecting pt at time of initial evaluation include stairs to enter home, ambulating with an assistive device, difficulty ambulating household distances, inability to ambulate community distances, inability to navigate level surfaces without external assistance, unable to perform dynamic tasks in the community, limited home support, difficulty performing ADLs, inability to perform IADLs, and limited insight into impairments. Please find objective findings from PT assessment regarding body systems outlined above with impairments and limitations including weakness, impaired balance, decreased endurance, gait deviations, decreased activity tolerance, decreased functional mobility tolerance, fall risk, and decreased cognition. The following objective measures were performed on initial evaluation Barthel Index: 50/100, Modified [place]: 4 (moderate/severe disability), and AM-PAC 6-Clicks: 16/24. Pt's clinical presentation is currently unstable/unpredictable seen in pt's presentation of need for ongoing medical management/monitoring, pt is a fall risk, pt requires use of RW for safe ambulation, and pt requires cues and assist for safety with functional mobility. Pt to benefit from continued  PT treatment to address deficits as defined above and maximize pt's level of function and independence with mobility. From a PT standpoint, recommendation at time of discharge would be level 2, moderate resource intensity in order to facilitate return to prior level of function.   Barriers to Discharge Inaccessible home environment;Decreased caregiver support   Goals   STG Expiration Date 11/07/24   Short Term Goal #1 In 10 days: Increase bilateral LE strength 1/2 grade to facilitate independent mobility, Perform all bed mobility tasks modified independent to decrease caregiver burden, Perform all transfers modified independent to improve independence, Ambulate > 250 ft. with least restrictive assistive device modified independent w/o LOB and w/ normalized gait pattern 100% of the time, Navigate 4 stairs modified independent with unilateral handrail to facilitate return to previous living environment, and Increase all balance 1/2 grade to decrease risk for falls   PT Treatment Day 1   Plan   Treatment/Interventions Functional transfer training;LE strengthening/ROM;Elevations;Therapeutic exercise;Endurance training;Cognitive reorientation;Patient/family training;Bed mobility;Gait training;OT   PT Frequency 3-5x/wk   Discharge Recommendation   Rehab Resource Intensity Level, PT II (Moderate Resource Intensity)   Equipment Recommended Walker   Walker Package Recommended Wheeled walker   Change/add to Walker Package? No   AM-PAC Basic Mobility Inpatient   Turning in Flat Bed Without Bedrails 3   Lying on Back to Sitting on Edge of Flat Bed Without Bedrails 3   Moving Bed to Chair 3   Standing Up From Chair Using Arms 3   Walk in Room 3   Climb 3-5 Stairs With Railing 1   Basic Mobility Inpatient Raw Score 16   Basic Mobility Standardized Score 38.32   [facility] Highest Level Of Mobility   -[facility] Goal 5: Stand one or more mins   -HLM Achieved 7: Walk 25 feet or more   Modified Yaniv Scale   Modified Yaniv Scale 4    Barthel Index   Feeding 10   Bathing 0   Grooming Score 0   Dressing Score 5   Bladder Score 10   Bowels Score 10   Toilet Use Score 5   Transfers (Bed/Chair) Score 10   Mobility (Level Surface) Score 0   Stairs Score 0   Barthel Index Score 50   Additional Treatment Session   Start Time 0841   End Time 0851   Treatment Assessment Pt agreeable to PT treatment session following PT evaluation. Pt performed seated therapeutic exercise as indicated below. Pt required verbal cues and minimal tactile cues for correct technique and form. Pt tolerated therapeutic exercise well without complaints of pain. Pt continues to exhibit decreased lower extremity strength, impaired balance, decreased endurance, gait deviations, and decreased functional mobility. PT to continue to recommend level 2, moderate resource intensity. PT to continue to follow and treat as appropriate.   Exercises   Quad Sets Sitting;10 reps;AROM;Bilateral  (long sitting)   Glute Sets Sitting;10 reps;AROM;Bilateral   Hip Flexion Sitting;10 reps;AROM;Bilateral   Hip Adduction Sitting;10 reps;AROM;Bilateral   Knee AROM Long Arc Quad Sitting;10 reps;AROM;Bilateral   Ankle Pumps Sitting;10 reps;AROM;Bilateral   End of Consult   Patient Position at End of Consult Bedside chair;Bed/Chair alarm activated;All needs within reach  (nursing staff aware)     PT Evaluation Time: 0827-0840    PT Treatment Time: 3930-3773  10 minutes  Noemí Rojas, PT, DPT

## 2024-10-28 NOTE — ASSESSMENT & PLAN NOTE
At a baseline ambulates with RW/cane  Has four steps to enter home, two steps to the backyard  PT/OT following  Fall precautions  Out of bed as tolerated  Encourage early and frequent mobilization  Encourage adequate hydration and nutrition  Provide adequate pain management   Goal is to STR  Continue with PT/OT for continued strength and balance training

## 2024-10-28 NOTE — ASSESSMENT & PLAN NOTE
Wt Readings from Last 3 Encounters:   10/28/24 91.5 kg (201 lb 11.5 oz)   10/22/24 95.6 kg (210 lb 12.8 oz)   09/03/24 90.2 kg (198 lb 12.8 oz)     BP well controlled  PTA medications: Amlodipine, Lisinopril; continuing at this time

## 2024-10-28 NOTE — PROGRESS NOTES
Patient:    MRN:  [id number]    Aidin Request ID:  [id number]    Level of care reserved:  Skilled Nursing Facility    Partner Reserved:  [email or web address], [place] [address] [phone]    Clinical needs requested:    Geography searched:  45 miles around [phone]    Start of Service:    Request sent:  9:07am EDT on 10/28/2024 by Nay Russell    Partner reserved:  12:00pm EDT on 10/28/2024 by Nay Russell    Choice list shared:  11:41am EDT on 10/28/2024 by Nay Russell

## 2024-10-28 NOTE — ASSESSMENT & PLAN NOTE
Cognitive decline  Alert and Oriented X 2  Baseline: AAOx2  Last MOCA completed 2022: 13/30, moderate  TSH 10/26/24: 2.547  Vit B12 2022: 1621   CT scan 4/2023: severe chronic arteriosclerosis  CT 2021 showing advanced microangiopathic change  Previously seen by  [facility] in 2022 s/p referral after noted forgetfulness/confusion in ED October 2021 by son  Staged as moderate dementia; positive family history of dementia (patient's brother)  Noted progressive decline in short and long term memory s/p stroke 2017  Goal is STR to LTC placement

## 2024-10-29 ENCOUNTER — TELEPHONE (OUTPATIENT)
Dept: CARDIOLOGY CLINIC | Facility: CLINIC | Age: 89
End: 2024-10-29

## 2024-10-29 LAB
ALBUMIN SERPL BCG-MCNC: 4 G/DL (ref 3.5–5)
ALP SERPL-CCNC: 47 U/L (ref 34–104)
ALT SERPL W P-5'-P-CCNC: 14 U/L (ref 7–52)
ANION GAP SERPL CALCULATED.3IONS-SCNC: 6 MMOL/L (ref 4–13)
AST SERPL W P-5'-P-CCNC: 13 U/L (ref 13–39)
ATRIAL RATE: 129 BPM
BASOPHILS # BLD AUTO: 0.05 THOUSANDS/ΜL (ref 0–0.1)
BASOPHILS NFR BLD AUTO: 1 % (ref 0–1)
BILIRUB SERPL-MCNC: 0.8 MG/DL (ref 0.2–1)
BUN SERPL-MCNC: 51 MG/DL (ref 5–25)
CALCIUM SERPL-MCNC: 9.4 MG/DL (ref 8.4–10.2)
CHLORIDE SERPL-SCNC: 96 MMOL/L (ref 96–108)
CO2 SERPL-SCNC: 33 MMOL/L (ref 21–32)
CREAT SERPL-MCNC: 2.08 MG/DL (ref 0.6–1.3)
EOSINOPHIL # BLD AUTO: 0.32 THOUSAND/ΜL (ref 0–0.61)
EOSINOPHIL NFR BLD AUTO: 5 % (ref 0–6)
ERYTHROCYTE [DISTWIDTH] IN BLOOD BY AUTOMATED COUNT: 14.8 % (ref 11.6–15.1)
GFR SERPL CREATININE-BSD FRML MDRD: 27 ML/MIN/1.73SQ M
GLUCOSE SERPL-MCNC: 110 MG/DL (ref 65–140)
GLUCOSE SERPL-MCNC: 123 MG/DL (ref 65–140)
GLUCOSE SERPL-MCNC: 138 MG/DL (ref 65–140)
GLUCOSE SERPL-MCNC: 144 MG/DL (ref 65–140)
GLUCOSE SERPL-MCNC: 221 MG/DL (ref 65–140)
HCT VFR BLD AUTO: 35 % (ref 36.5–49.3)
HGB BLD-MCNC: 11.1 G/DL (ref 12–17)
IMM GRANULOCYTES # BLD AUTO: 0.02 THOUSAND/UL (ref 0–0.2)
IMM GRANULOCYTES NFR BLD AUTO: 0 % (ref 0–2)
LYMPHOCYTES # BLD AUTO: 1.33 THOUSANDS/ΜL (ref 0.6–4.47)
LYMPHOCYTES NFR BLD AUTO: 19 % (ref 14–44)
MAGNESIUM SERPL-MCNC: 2 MG/DL (ref 1.9–2.7)
MCH RBC QN AUTO: 30.9 PG (ref 26.8–34.3)
MCHC RBC AUTO-ENTMCNC: 31.7 G/DL (ref 31.4–37.4)
MCV RBC AUTO: 98 FL (ref 82–98)
MONOCYTES # BLD AUTO: 0.56 THOUSAND/ΜL (ref 0.17–1.22)
MONOCYTES NFR BLD AUTO: 8 % (ref 4–12)
NEUTROPHILS # BLD AUTO: 4.67 THOUSANDS/ΜL (ref 1.85–7.62)
NEUTS SEG NFR BLD AUTO: 67 % (ref 43–75)
NRBC BLD AUTO-RTO: 0 /100 WBCS
PLATELET # BLD AUTO: 165 THOUSANDS/UL (ref 149–390)
PMV BLD AUTO: 9.7 FL (ref 8.9–12.7)
POTASSIUM SERPL-SCNC: 3.9 MMOL/L (ref 3.5–5.3)
PROT SERPL-MCNC: 7.1 G/DL (ref 6.4–8.4)
QRS AXIS: 95 DEGREES
QRSD INTERVAL: 162 MS
QT INTERVAL: 484 MS
QTC INTERVAL: 499 MS
RBC # BLD AUTO: 3.59 MILLION/UL (ref 3.88–5.62)
SODIUM SERPL-SCNC: 135 MMOL/L (ref 135–147)
T WAVE AXIS: 95 DEGREES
VENTRICULAR RATE: 64 BPM
WBC # BLD AUTO: 6.95 THOUSAND/UL (ref 4.31–10.16)

## 2024-10-29 PROCEDURE — 97116 GAIT TRAINING THERAPY: CPT

## 2024-10-29 PROCEDURE — 99232 SBSQ HOSP IP/OBS MODERATE 35: CPT | Performed by: INTERNAL MEDICINE

## 2024-10-29 PROCEDURE — 82948 REAGENT STRIP/BLOOD GLUCOSE: CPT

## 2024-10-29 PROCEDURE — 80053 COMPREHEN METABOLIC PANEL: CPT | Performed by: INTERNAL MEDICINE

## 2024-10-29 PROCEDURE — 93010 ELECTROCARDIOGRAM REPORT: CPT | Performed by: INTERNAL MEDICINE

## 2024-10-29 PROCEDURE — 85025 COMPLETE CBC W/AUTO DIFF WBC: CPT | Performed by: INTERNAL MEDICINE

## 2024-10-29 PROCEDURE — 97167 OT EVAL HIGH COMPLEX 60 MIN: CPT

## 2024-10-29 PROCEDURE — 83735 ASSAY OF MAGNESIUM: CPT | Performed by: INTERNAL MEDICINE

## 2024-10-29 PROCEDURE — 97110 THERAPEUTIC EXERCISES: CPT

## 2024-10-29 PROCEDURE — 99233 SBSQ HOSP IP/OBS HIGH 50: CPT | Performed by: INTERNAL MEDICINE

## 2024-10-29 RX ORDER — LANOLIN ALCOHOL/MO/W.PET/CERES
3 CREAM (GRAM) TOPICAL
Status: DISCONTINUED | OUTPATIENT
Start: 2024-10-29 | End: 2024-10-30 | Stop reason: HOSPADM

## 2024-10-29 RX ORDER — ECHINACEA PURPUREA EXTRACT 125 MG
2 TABLET ORAL 4 TIMES DAILY PRN
Status: DISCONTINUED | OUTPATIENT
Start: 2024-10-29 | End: 2024-10-30 | Stop reason: HOSPADM

## 2024-10-29 RX ADMIN — FUROSEMIDE 60 MG: 40 TABLET ORAL at 18:20

## 2024-10-29 RX ADMIN — METOPROLOL TARTRATE 25 MG: 25 TABLET, FILM COATED ORAL at 09:55

## 2024-10-29 RX ADMIN — APIXABAN 2.5 MG: 2.5 TABLET, FILM COATED ORAL at 09:55

## 2024-10-29 RX ADMIN — FINASTERIDE 5 MG: 5 TABLET, FILM COATED ORAL at 09:55

## 2024-10-29 RX ADMIN — ASPIRIN 81 MG: 81 TABLET, COATED ORAL at 09:55

## 2024-10-29 RX ADMIN — ISOSORBIDE DINITRATE 10 MG: 10 TABLET ORAL at 09:55

## 2024-10-29 RX ADMIN — INSULIN LISPRO 2 UNITS: 100 INJECTION, SOLUTION INTRAVENOUS; SUBCUTANEOUS at 14:08

## 2024-10-29 RX ADMIN — Medication 3 MG: at 21:01

## 2024-10-29 RX ADMIN — APIXABAN 2.5 MG: 2.5 TABLET, FILM COATED ORAL at 18:20

## 2024-10-29 RX ADMIN — AMLODIPINE BESYLATE 10 MG: 10 TABLET ORAL at 09:55

## 2024-10-29 RX ADMIN — ISOSORBIDE DINITRATE 10 MG: 10 TABLET ORAL at 18:21

## 2024-10-29 RX ADMIN — Medication 3 MG: at 01:30

## 2024-10-29 RX ADMIN — LISINOPRIL 20 MG: 20 TABLET ORAL at 09:55

## 2024-10-29 NOTE — ASSESSMENT & PLAN NOTE
HR is predominantly well-controlled, continue Lopressor.  [id number] at least 6, continue Eliquis 2.5 mg bid (age, creatinine).

## 2024-10-29 NOTE — ASSESSMENT & PLAN NOTE
Frequent falls within the last year per daughter, however did not seek medical attention  Area rugs, steps to enter home, gait instability, dog as tripping hazards within home  [place] fall precautions   Assess patient frequently for physical needs, encourage use of assistant devices as needed and directed by PT/OT  Identify cognitive and physical deficits and behaviors that affect risk of falls  Consider moving patient closer to nursing station to monitor more closely for impulsive behavior which may increase risk of falls  Educate patient/family on patient safety including physical limitations and importance of using call bell for assistance   Modify environment to reduce risk of injury including disconnecting from pole when not in use, ensuring adequate lighting in room and restroom, ensuring that path to restroom is clear and free of trip hazards  PT/OT consulted to assist with strengthening/mobility and assist with discharge planning to appropriate level of care

## 2024-10-29 NOTE — ASSESSMENT & PLAN NOTE
Lab Results   Component Value Date    HGBA1C 7.1 (H) 06/11/2024     Recent Labs     10/27/24  0455 10/27/24  0619 10/28/24  0429 10/28/24  0618 10/28/24  2112 10/29/24  0531 10/29/24  0621 10/29/24  1044   POCGLU  --    < >  --    < > 149*  --  110 221*   GLUC 127  --  132  --   --  123  --   --     < > = values in this interval not displayed.     Hold home regimen while inpatient and resume on discharge  Diabetic diet  Insulin regimen  Glucose checks and Insulin correction ACHS  Goal -180 while admitted, adjusting insulin regimen as appropriate  Monitor for hypoglycemia and treat per protocol

## 2024-10-29 NOTE — OCCUPATIONAL THERAPY NOTE
"          Occupational Therapy Evaluation        Patient Name: Tom Stewart  Today's Date: 10/29/2024         10/29/24 1016   OT Last Visit   OT Visit Date 10/29/24   Note Type   Note type Evaluation   Pain Assessment   Pain Assessment Tool 0-10   Pain Score 4   Pain Location/Orientation Orientation: Left;Location: Hip   Pain Onset/Description Frequency: Intermittent   Hospital Pain Intervention(s) Repositioned;Ambulation/increased activity   Restrictions/Precautions   Weight Bearing Precautions Per Order No   Braces or Orthoses Other (Comment)  (none at baseline)   Other Precautions Cognitive;Chair Alarm;Bed Alarm;Fall Risk   Home Living   Type of Home House   Home Layout One level;Able to live on main level with bedroom/bathroom;Performs ADLs on one level;Stairs to enter without rails  (4 DILMA)   Bathroom Shower/Tub Walk-in shower   Bathroom Toilet Standard   Bathroom Equipment Grab bars in shower   Bathroom Accessibility Accessible   Home Equipment   (none reported)   Additional Comments Patient ambulates without an AD   Prior Function   Level of [place] Independent with functional mobility;Independent with ADLs;Needs assistance with IADLS   Lives With Significant other  (\"Bushra\")   Receives Help From Family;Friend(s)   IADLs Family/Friend/Other provides transportation;Family/Friend/Other provides meals;Family/Friend/Other provides medication management   Falls in the last 6 months 0  (pt denies)   Vocational Retired   Lifestyle   Autonomy Patient reported he lives with his girlfriend, Bushra in a one story house, 4 DILMA. Patient ambulates without an AD and is indepndnet with basic self care and mobility.   Reciprocal Relationships Supportive Family   Service to Others Retired   Intrinsic Gratification \"I enjoy taking care of my puppy\"   General   Family/Caregiver Present No   ADL   Where Assessed Chair   Eating Assistance 6  Modified independent   Grooming Assistance 5  Supervision/Setup   UB Bathing " "Assistance 4  Minimal Assistance   LB Bathing Assistance 3  Moderate Assistance   UB Dressing Assistance 4  Minimal Assistance   LB Dressing Assistance 3  Moderate Assistance   Toileting Assistance  3  Moderate Assistance   Functional Assistance 4  Minimal Assistance   Bed Mobility   Additional Comments received patient sititng OOB to recliner Chair, required min assist for transfers and ambulation with Nursing staff.   Transfers   Sit to Stand 4  Minimal assistance   Additional items Assist x 1;Increased time required;Verbal cues   Stand to Sit 4  Minimal assistance   Additional items Assist x 1;Increased time required;Verbal cues   Functional Mobility   Functional Mobility 4  Minimal assistance   Additional Comments assist of 1/ verbal cues/   Additional items Rolling walker   Balance   Static Sitting Fair +   Dynamic Sitting Fair   Static Standing Fair -   Dynamic Standing Poor +   Activity Tolerance   Activity Tolerance Patient tolerated treatment well   RUE Assessment   RUE Assessment WFL   LUE Assessment   LUE Assessment WFL   Sensation   Light Touch No apparent deficits  (BUEs)   Vision-Basic Assessment   Current Vision Wears glasses only for reading   Cognition   Overall Cognitive Status Impaired   Arousal/Participation Alert;Responsive;Cooperative   Attention Attends with cues to redirect   Orientation Level Oriented to person;Oriented to place;Disoriented to time;Disoriented to situation  (place: \"hospital\")   Memory Decreased recall of recent events;Decreased short term memory   Following Commands Follows one step commands without difficulty   Assessment   Limitation Decreased ADL status;Decreased endurance;Decreased self-care trans;Decreased high-level ADLs   Prognosis Good   Assessment Patient is a 88 y.o. male seen for OT evaluation s/p admit to [facility] on 10/25/2024 w/Acute on chronic heart failure with preserved ejection fraction (HCC). Commorbidities affecting patient's functional " performance at time of assessment include:  mixed hyperlipidemia due to type 2 DM, CAD, history of CVA, paroxysmal atrial fibrillation, stage 4 CKD, ambulatory dysfunction, primary hypertension, Alzheimer's disease, and acute respiratory failure with hypoxia. Orders placed for OT evaluation and treatment.  Performed at least two patient identifiers during session including name and wristband.  Prior to admission,  Patient reported he lives with his girlfriend, Bushra in a one story house, 4 DILMA. Patient ambulates without an AD and is indepndnet with basic self care and mobility.  Personal factors affecting patient at time of initial evaluation include: limited insight into deficits, difficulty performing ADLs, and difficulty performing IADLs. Upon evaluation, patient requires minimal  assist for UB ADLs, minimal  and moderate assist for LB ADLs.  Occupational performance is affected by the following deficits: orientation, degenerative arthritic joint changes, dynamic sit/ stand balance deficit with poor standing tolerance time for self care and functional mobility, decreased activity tolerance, decreased safety awareness, and postural control and postural alignment deficit, requiring external assistance to complete transitional movements.  Patient to benefit from continued Occupational Therapy treatment while in the hospital to address deficits as defined above and maximize level of functional independence with ADLs and functional mobility. Occupational Performance areas to address include: bathing/ shower, dressing, toilet hygiene, transfer to all surfaces, functional mobility, emergency response, health maintenance, and IADLs: safety procedures. From OT standpoint, recommendation at time of d/c would be Level 2.   Plan   Treatment Interventions ADL retraining;Functional transfer training;Endurance training;Patient/family training;Equipment evaluation/education;Compensatory technique education;Continued  evaluation;Energy conservation;Activityengagement   Goal Expiration Date 11/12/24   OT Frequency 3-5x/wk   Discharge Recommendation   Rehab Resource Intensity Level, OT II (Moderate Resource Intensity)   AM-PAC Daily Activity Inpatient   Lower Body Dressing 2   Bathing 2   Toileting 3   Upper Body Dressing 3   Grooming 3   Eating 4   Daily Activity Raw Score 17   Daily Activity Standardized Score (Calc for Raw Score >=11) 37.26   AM-PAC Applied Cognition Inpatient   Following a Speech/Presentation 4   Understanding Ordinary Conversation 4   Taking Medications 2   Remembering Where Things Are Placed or Put Away 2   Remembering List of 4-5 Errands 2   Taking Care of Complicated Tasks 1   Applied Cognition Raw Score 15   Applied Cognition Standardized Score 33.54   Barthel Index   Feeding 10   Bathing 0   Grooming Score 0   Dressing Score 5   Bladder Score 10   Bowels Score 10   Toilet Use Score 5   Transfers (Bed/Chair) Score 10   Mobility (Level Surface) Score 0   Stairs Score 0   Barthel Index Score 50       1 - Patient will verbalize and demonstrate use of energy conservation/ deep breathing technique and work simplification skills during functional activity with no verbal cues.    2 - Patient will verbalize and demonstrate good body mechanics and joint protection techniques during  ADLs/ IADLs with no verbal cues.    3 - Patient will increase OOB/ sitting tolerance to 2-4 hours per day for increased participation in self care and leisure tasks with no s/s of exertion.    4 - Patient will increase standing tolerance time to 5  minutes with unilateral UE support to complete sink level ADLs@ mod I level.    5 - Patient will increase sitting tolerance at edge of bed to 20 minutes to complete UB ADLs @ set up assist level.    6 - Patient will transfer bed to Chair / toilet at Set up assist level with AD as indicated.     7 - Patient will complete UB ADLs with set up assist.     8 - Patient will complete LB ADLs with  min assist with the use of adaptive equipment.     9 - Patient will complete toileting hygiene with set up assist/ supervision for thoroughness    10 - Patient/ Family  will demonstrate competency with UE Home Exercise Program.

## 2024-10-29 NOTE — PROGRESS NOTES
Cardiology Progress Note - Tom Stewart 88 y.o. male MRN: [id number]    Unit/Bed#: -01 Encounter: [id number]      Assessment & Plan  Acute on chronic heart failure with preserved ejection fraction (HCC)  Wt Readings from Last 3 Encounters:   10/29/24 91.3 kg (201 lb 4.5 oz)   10/22/24 95.6 kg (210 lb 12.8 oz)   09/03/24 90.2 kg (198 lb 12.8 oz)   Euvolemic on exam.  I/O net -2.1 L, creatinine stable.  Transition to Lasix 60 mg po twice daily.  Recommend strict I/O's, daily weights, and sodium restriction.   Coronary artery disease involving native coronary artery of native heart without angina pectoris  Presented with chest pain/SOB.  Patient's daughter wishes to proceed with noninvasive medical management.  Continue ASA, Lopressor, and Isordil.  Not on statin due to history of intolerance.  Paroxysmal atrial fibrillation (HCC)  HR is predominantly well-controlled, continue Lopressor.  [id number] at least 6, continue Eliquis 2.5 mg bid (age, creatinine).  Aortic stenosis  Most recent TTE shows moderate aortic stenosis.  Family wishes to proceed with conservative management.  Primary hypertension  BP is predominantly well-controlled, continue to monitor.  Continue amlodipine, lisinopril, Lopressor, Isordil, and Lasix.  Mixed hyperlipidemia due to type 2 diabetes mellitus  (HCC)  Lab Results   Component Value Date    HGBA1C 7.1 (H) 06/11/2024     Recent Labs     10/28/24  1114 10/28/24  1652 10/28/24  2112 10/29/24  0621   POCGLU 209* 149* 149* 110     Blood Sugar Average: Last 72 hrs:  (P) 155  Not on statin due to reported history of intolerance.  Management per primary service.  Acute respiratory failure with hypoxia (HCC)  Management per primary team.  History of CVA (cerebrovascular accident)  On ASA and Eliquis.      Cardiology will sign off at this time, please reach out as needed.  We will continue to follow-up as an outpatient.      Subjective:   Patient seen and examined.  No significant events  overnight.  Patient resting in bed without any new complaints at this time.  All questions were answered.    Objective:     Vitals: Blood pressure 136/79, pulse 64, temperature 97.8 °F (36.6 °C), resp. rate 13, weight 91.3 kg (201 lb 4.5 oz), SpO2 96%., Body mass index is 28.88 kg/m².,   Orthostatic Blood Pressures      Flowsheet Row Most Recent Value   Blood Pressure 136/79 filed at 10/29/2024 0734              Intake/Output Summary (Last 24 hours) at 10/29/2024 0916  Last data filed at 10/29/2024 0613  Gross per 24 hour   Intake 240 ml   Output 985 ml   Net -745 ml         Physical Exam:  GEN: Alert and oriented x 3, in no acute distress.  Well appearing and well nourished.   HEENT: Sclera anicteric, conjunctivae pink, mucous membranes moist. Oropharynx clear.   NECK: Supple, no carotid bruits, no significant JVD. Trachea midline, no thyromegaly.   HEART: Regular rhythm, normal S1 and S2, +2/6 LINDSAY, no clicks, gallops or rubs. PMI nondisplaced, no thrills.   LUNGS: Clear to auscultation bilaterally; no wheezes, rales, or rhonchi. No increased work of breathing or signs of respiratory distress.   ABDOMEN: Soft, nontender, nondistended, normoactive bowel sounds.   EXTREMITIES: Skin warm and well perfused, no clubbing or cyanosis, no edema.  NEURO: No focal findings. Normal speech. Mood and affect normal.   SKIN: Normal without suspicious lesions on exposed skin.        Medications:      Current Facility-Administered Medications:     acetaminophen (TYLENOL) tablet 650 mg, 650 mg, Oral, Q4H PRN, Candelaria Whiteside PA-C, 650 mg at 10/27/24 2103    amLODIPine (NORVASC) tablet 10 mg, 10 mg, Oral, Daily, TOMASZ Green-ETHEL, 10 mg at 10/28/24 0923    apixaban (ELIQUIS) tablet 2.5 mg, 2.5 mg, Oral, BID, Candelaria Whiteside PA-C, 2.5 mg at 10/28/24 1729    aspirin (ECOTRIN LOW STRENGTH) EC tablet 81 mg, 81 mg, Oral, Daily, Candelaria Whiteside PA-C, 81 mg at 10/28/24 0924    finasteride (PROSCAR) tablet 5 mg, 5 mg, Oral,  Daily, Candelaria Whiteside PA-C, 5 mg at 10/28/24 0924    furosemide (LASIX) injection 60 mg, 60 mg, Intravenous, BID (diuretic), JALEEL Salazar, 60 mg at 10/28/24 1729    hydrALAZINE (APRESOLINE) injection 10 mg, 10 mg, Intravenous, Q6H PRN, Jake Bell,     insulin lispro (HumALOG/ADMELOG) 100 units/mL subcutaneous injection 1-6 Units, 1-6 Units, Subcutaneous, TID AC, 2 Units at 10/28/24 1125 **AND** Fingerstick Glucose (POCT), , , TID AC, Candelaria Whiteside PA-C    isosorbide dinitrate (ISORDIL) tablet 10 mg, 10 mg, Oral, BID, CECILE GreenC, 10 mg at 10/28/24 1729    labetalol (NORMODYNE) injection 10 mg, 10 mg, Intravenous, Q6H PRN, Jake Bell DO    lidocaine (LIDODERM) 5 % patch 1 patch, 1 patch, Topical, Daily, Candelaria Whiteside PA-C    lisinopril (ZESTRIL) tablet 20 mg, 20 mg, Oral, Daily, TOMASZ Green-ETHEL, 20 mg at 10/28/24 0925    melatonin tablet 3 mg, 3 mg, Oral, HS, JALEEL Corona, 3 mg at 10/29/24 0130    metoprolol tartrate (LOPRESSOR) tablet 25 mg, 25 mg, Oral, Daily, TOMASZ Green-C, 25 mg at 10/28/24 0925    sodium chloride (OCEAN) 0.65 % nasal spray 2 spray, 2 spray, Each Nare, 4x Daily PRN, JALEEL Corona    Insert peripheral IV, , , Once **AND** sodium chloride (PF) 0.9 % injection 3 mL, 3 mL, Intravenous, Q1H PRN, Candelaria Whiteside PA-C     Labs & Results:     Results from last 7 days   Lab Units 10/26/24  0046 10/25/24  2213 10/25/24  1932   HS TNI 0HR ng/L  --   --  67*   HS TNI 2HR ng/L  --  69*  --    HSTNI D2 ng/L  --  2  --    HS TNI 4HR ng/L 72*  --   --    HSTNI D4 ng/L 5  --   --      Results from last 7 days   Lab Units 10/29/24  0531 10/28/24  0429 10/27/24  0455   WBC Thousand/uL 6.95 6.95 6.65   HEMOGLOBIN g/dL 11.1* 11.1* 10.7*   HEMATOCRIT % 35.0* 35.2* 34.7*   PLATELETS Thousands/uL 165 172 161         Results from last 7 days   Lab Units 10/29/24  0531 10/28/24  0429 10/27/24  0455 10/26/24  0534   POTASSIUM mmol/L  3.9 3.9 4.5 4.1   CHLORIDE mmol/L 96 97 100 99   CO2 mmol/L 33* 30 28 28   BUN mg/dL 51* 47* 47* 37*   CREATININE mg/dL 2.08* 1.99* 2.00* 1.85*   CALCIUM mg/dL 9.4 9.2 9.3 9.4   ALK PHOS U/L 47  --   --  47   ALT U/L 14  --   --  23   AST U/L 13  --   --  19         Results from last 7 days   Lab Units 10/29/24  0531 10/28/24  0429 10/27/24  0455   MAGNESIUM mg/dL 2.0 2.0 2.0       Vitals: Blood pressure 136/79, pulse 64, temperature 97.8 °F (36.6 °C), resp. rate 13, weight 91.3 kg (201 lb 4.5 oz), SpO2 96%., Body mass index is 28.88 kg/m².,   Orthostatic Blood Pressures      Flowsheet Row Most Recent Value   Blood Pressure 136/79 filed at 10/29/2024 0734            Systolic (24hrs), Av , Min:114 , Max:141     Diastolic (24hrs), Av, Min:65, Max:79        Intake/Output Summary (Last 24 hours) at 10/29/2024 0916  Last data filed at 10/29/2024 0613  Gross per 24 hour   Intake 240 ml   Output 985 ml   Net -745 ml       Invasive Devices       Peripheral Intravenous Line  Duration             Peripheral IV 10/28/24 Right;Ventral (anterior) Hand <1 day                          BP Readings from Last 3 Encounters:   10/29/24 136/79   10/22/24 124/74   24 124/65      Wt Readings from Last 3 Encounters:   10/29/24 91.3 kg (201 lb 4.5 oz)   10/22/24 95.6 kg (210 lb 12.8 oz)   24 90.2 kg (198 lb 12.8 oz)

## 2024-10-29 NOTE — ASSESSMENT & PLAN NOTE
Wt Readings from Last 3 Encounters:   10/29/24 91.3 kg (201 lb 4.5 oz)   10/22/24 95.6 kg (210 lb 12.8 oz)   09/03/24 90.2 kg (198 lb 12.8 oz)     BP well controlled  PTA medications: Amlodipine, Lisinopril; continuing at this time

## 2024-10-29 NOTE — TELEPHONE ENCOUNTER
----- Message from Ignacio Richards PA-C sent at 10/29/2024  2:12 PM EDT -----  Regarding: Hosp f/u CHF  Patient clinical visit in 1-3 day at the Cardio location: [place] office. .    Schedule visit with Cardio [place] Providers: first available provider.    Type of Visit: VISIT TYPE: in-person office visit.    Additional Details: CHF

## 2024-10-29 NOTE — PLAN OF CARE
Problem: OCCUPATIONAL THERAPY ADULT  Goal: Performs self-care activities at highest level of function for planned discharge setting.  See evaluation for individualized goals.  Description: Treatment Interventions: ADL retraining, Functional transfer training, Endurance training, Patient/family training, Equipment evaluation/education, Compensatory technique education, Continued evaluation, Energy conservation, Activityengagement          See flowsheet documentation for full assessment, interventions and recommendations.   Note: Limitation: Decreased ADL status, Decreased endurance, Decreased self-care trans, Decreased high-level ADLs  Prognosis: Good  Assessment: Patient is a 88 y.o. male seen for OT evaluation s/p admit to [facility] on 10/25/2024 w/Acute on chronic heart failure with preserved ejection fraction (HCC). Commorbidities affecting patient's functional performance at time of assessment include:  mixed hyperlipidemia due to type 2 DM, CAD, history of CVA, paroxysmal atrial fibrillation, stage 4 CKD, ambulatory dysfunction, primary hypertension, Alzheimer's disease, and acute respiratory failure with hypoxia. Orders placed for OT evaluation and treatment.  Performed at least two patient identifiers during session including name and wristband.  Prior to admission,  Patient reported he lives with his girlfriend, Bushra in a one story house, 4 DILMA. Patient ambulates without an AD and is indepndnet with basic self care and mobility.  Personal factors affecting patient at time of initial evaluation include: limited insight into deficits, difficulty performing ADLs, and difficulty performing IADLs. Upon evaluation, patient requires minimal  assist for UB ADLs, minimal  and moderate assist for LB ADLs.  Occupational performance is affected by the following deficits: orientation, degenerative arthritic joint changes, dynamic sit/ stand balance deficit with poor standing tolerance time for self care  and functional mobility, decreased activity tolerance, decreased safety awareness, and postural control and postural alignment deficit, requiring external assistance to complete transitional movements.  Patient to benefit from continued Occupational Therapy treatment while in the hospital to address deficits as defined above and maximize level of functional independence with ADLs and functional mobility. Occupational Performance areas to address include: bathing/ shower, dressing, toilet hygiene, transfer to all surfaces, functional mobility, emergency response, health maintenance, and IADLs: safety procedures. From OT standpoint, recommendation at time of d/c would be Level 2.     Rehab Resource Intensity Level, OT: II (Moderate Resource Intensity)

## 2024-10-29 NOTE — ASSESSMENT & PLAN NOTE
Lab Results   Component Value Date    HGBA1C 7.1 (H) 06/11/2024       Recent Labs     10/28/24  1652 10/28/24  2112 10/29/24  0621 10/29/24  1044   POCGLU 149* 149* 110 221*       Blood Sugar Average: Last 72 hrs:  (P) [id number]

## 2024-10-29 NOTE — ASSESSMENT & PLAN NOTE
Lab Results   Component Value Date    HGBA1C 7.1 (H) 06/11/2024       Recent Labs     10/28/24  1652 10/28/24  2112 10/29/24  0621 10/29/24  1044   POCGLU 149* 149* 110 221*       Blood Sugar Average: Last 72 hrs:  (P) [id number]    PTA: Glipizide, currently holding at this time  Continue POC Glucose  Hypoglycemia protocol  SSI TID with meals

## 2024-10-29 NOTE — ASSESSMENT & PLAN NOTE
Wt Readings from Last 3 Encounters:   10/29/24 91.3 kg (201 lb 4.5 oz)   10/22/24 95.6 kg (210 lb 12.8 oz)   09/03/24 90.2 kg (198 lb 12.8 oz)     Presented to ED with SOB, CP  BNP upon arrival 2,374  Recently seen by PCP on 10/22 for SOB and bilateral leg swelling and was suppose to increase Lasix dose to twice daily for 3 days; unsure compliance   Upon exam, SOB however 2L NC in place and O2 Sat 98%  Denies CP  CXR 10/25 showing: CHF with mild perihilar edema   Trace bilateral lower extremity edema noted  Continue Cardiac diet, fluid restriction 1800 mL   Daily weights

## 2024-10-29 NOTE — PROGRESS NOTES
Progress Note - Hospitalist   Name: Tom Stewart 88 y.o. male I MRN: [id number]  Unit/Bed#: -01 I Date of Admission: 10/25/2024   Date of Service: 10/29/2024 I Hospital Day: 4    Assessment & Plan  Acute on chronic heart failure with preserved ejection fraction (HCC)  Presents to ED c/o worsening SOB and chest pain/pressure.  Patient poor historian, most of history obtained from daughter  Per chart review, recently seen by PCP 10/22 patient complaining of SOB and leg swelling with reported O2 sat 81-90%  Patient was instructed to increase home Lasix to twice daily x 3 days and report to ED if O2 dropped below 90  Daughter reports patient has poor medication adherence due to dementia.  He lives at home with girlfriend who does not help him with his medication, daughter says she helps him as much as she can    Lab Results   Component Value Date    LVEF 50 08/15/2023    BNP 2,374 (H) 10/25/2024    BNP 2,419 (H) 10/22/2024     Presents with increased shortness of breath  CXR with possible pulmonary venous congestion, pending final read  NYHA Class III., Stage C  Patient is currently volume overloaded.- bilateral lower extremity pitting edema    Plan:  GDMT:  Diuretic: Lasix 60 mg PO BID  B-Blocker: Lopressor 25 mg BID, ACE/ARB/ARNi: Lisinopril 20 mg  Sodium restriction 2g  CMP, magnesium tomorrow a.m; Goal Mg > 2 and K > 4; Replete prn  HOB > 30°, Daily standing weights, Measure I/O  Consult nutrition for dietary education  Cards consulted    Acute respiratory failure with hypoxia (HCC)  Currently SpO2: (!) 85 % on Nasal Cannula O2 Flow Rate (L/min): 2 L/min   On room air at home  Wean oxygen as appropriate  Type 2 diabetes mellitus with stage 4 chronic kidney disease, without long-term current use of insulin (HCC)  Lab Results   Component Value Date    HGBA1C 7.1 (H) 06/11/2024     Recent Labs     10/27/24  0455 10/27/24  0619 10/28/24  0429 10/28/24  0618 10/28/24  2112 10/29/24  0531 10/29/24  0621  10/29/24  1044   POCGLU  --    < >  --    < > 149*  --  110 221*   GLUC 127  --  132  --   --  123  --   --     < > = values in this interval not displayed.     Hold home regimen while inpatient and resume on discharge  Diabetic diet  Insulin regimen  Glucose checks and Insulin correction ACHS  Goal -180 while admitted, adjusting insulin regimen as appropriate  Monitor for hypoglycemia and treat per protocol    Paroxysmal atrial fibrillation (HCC)  Stable  Continue pta eliquis and lopressor  Stage 4 chronic kidney disease (HCC)  Cr 1.94 within baseline  Continue to monitor Cr, avoid nephrotoxic agents  Lab Results   Component Value Date    EGFR 27 10/29/2024    EGFR 29 10/28/2024    EGFR 29 10/27/2024    CREATININE 2.08 (H) 10/29/2024    CREATININE 1.99 (H) 10/28/2024    CREATININE 2.00 (H) 10/27/2024   Monitor renal function  Coronary artery disease involving native coronary artery of native heart without angina pectoris  Hx of CAD s/p PCI Jan 2020  C/o chest pain, EKG without ischemia. Suspected d/t CHF exacerbation  Continue pta ASA and eliquis  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 (HCC)  Blood Pressure: 119/63   BP stable since admission  Continue pta amlodipine and lisinopril  BP PRN meds IV in place for BP > 160    Other Alzheimer's disease (HCC)  Pt w/ known hx of dementia, currently at baseline A&O x2  Is not on medication  Fall precautions, delirium precautions in place  PT/OT consulted  Mixed hyperlipidemia due to type 2 diabetes mellitus  (HCC)  Lab Results   Component Value Date    CHOLESTEROL 199 06/11/2024    TRIG 138 06/11/2024    HDL 46 06/11/2024    LDLCALC 125 (H) 06/11/2024   Hold off statin for now      Blood Sugar Average: Last 72 hrs:  (P) [id number]    History of CVA (cerebrovascular accident)  Known hx; superimposed on dementia  Primary hypertension  Blood Pressure: 119/63    Plan:  Continue home meds  Monitor blood pressure  PRN IV Labetalol and  Hydralazine for SBP > 160    Ambulatory dysfunction  PT/OT Eval and treat  Trend Mobility Scores  Encourage appropriate mobility to achieve and improve upon HLM Goals as noted  Plan for placement to rehab on discharge  Fall    Gait instability    Hearing loss    Encounter for delirium elderly at risk (DEAR) screening    Frailty syndrome in geriatric patient    Aortic stenosis      VTE Pharmacologic Prophylaxis: VTE Score: 7 High Risk (Score >/= 5) - Pharmacological DVT Prophylaxis Ordered: apixaban (Eliquis). Sequential Compression Devices Ordered.    Mobility:   Basic Mobility Inpatient Raw Score: 16  JH-HLM Goal: 5: Stand one or more mins  JH-HLM Achieved: 7: Walk 25 feet or more  JH-HLM Goal achieved. Continue to encourage appropriate mobility.    Patient Centered Rounds: I performed bedside rounds with nursing staff today.   Discussions with Specialists or Other Care Team Provider: cardiology    Education and Discussions with Family / Patient: Updated  (daughter) via phone.    Current Length of Stay: 4 day(s)  Current Patient Status: Inpatient   Certification Statement: The patient will continue to require additional inpatient hospital stay due to placement to rehab  Discharge Plan: Anticipate discharge in 24-48 hrs to rehab facility.    Code Status: Level 3 - DNAR and DNI    Subjective   Offers no complaints at this time.  Denies any chest pain, shortness of breath. No acute events overnight reported.    Objective :  Temp:  [97.8 °F (36.6 °C)-98.2 °F (36.8 °C)] 98.2 °F (36.8 °C)  HR:  [47-64] 47  BP: (119-141)/(63-79) 119/63  Resp:  [13] 13  SpO2:  [85 %-96 %] 85 %    Body mass index is 28.88 kg/m².     Input and Output Summary (last 24 hours):     Intake/Output Summary (Last 24 hours) at 10/29/2024 1553  Last data filed at 10/29/2024 1501  Gross per 24 hour   Intake 120 ml   Output 1010 ml   Net -890 ml       Physical Exam  Vitals and nursing note reviewed.   Constitutional:       General: He is  not in acute distress.     Appearance: He is well-developed. He is ill-appearing (Chronically). He is not diaphoretic.   HENT:      Head: Normocephalic and atraumatic.      Nose: Nose normal.   Eyes:      General: No scleral icterus.     Conjunctiva/sclera: Conjunctivae normal.      Pupils: Pupils are equal, round, and reactive to light.   Neck:      Thyroid: No thyromegaly.   Cardiovascular:      Rate and Rhythm: Normal rate and regular rhythm.      Heart sounds: Normal heart sounds. No murmur heard.  Pulmonary:      Effort: Pulmonary effort is normal.      Breath sounds: Normal breath sounds. No wheezing, rhonchi or rales.   Abdominal:      General: Bowel sounds are normal. There is no distension.      Palpations: Abdomen is soft.      Tenderness: There is no abdominal tenderness.   Musculoskeletal:         General: No swelling, tenderness or deformity.      Cervical back: Neck supple.      Right lower leg: No edema (improved).      Left lower leg: No edema (improved).   Skin:     General: Skin is warm and dry.   Neurological:      Mental Status: He is alert. Mental status is at baseline.   Psychiatric:         Behavior: Behavior normal.         Thought Content: Thought content normal.         Judgment: Judgment normal.         Lines/Drains:              Lab Results: I have reviewed the following results:   Results from last 7 days   Lab Units 10/29/24  0531   WBC Thousand/uL 6.95   HEMOGLOBIN g/dL 11.1*   HEMATOCRIT % 35.0*   PLATELETS Thousands/uL 165   SEGS PCT % 67   LYMPHO PCT % 19   MONO PCT % 8   EOS PCT % 5     Results from last 7 days   Lab Units 10/29/24  0531   SODIUM mmol/L 135   POTASSIUM mmol/L 3.9   CHLORIDE mmol/L 96   CO2 mmol/L 33*   BUN mg/dL 51*   CREATININE mg/dL 2.08*   ANION GAP mmol/L 6   CALCIUM mg/dL 9.4   ALBUMIN g/dL 4.0   TOTAL BILIRUBIN mg/dL 0.80   ALK PHOS U/L 47   ALT U/L 14   AST U/L 13   GLUCOSE RANDOM mg/dL 123         Results from last 7 days   Lab Units 10/29/24  1044  10/29/24  0621 10/28/24  2112 10/28/24  1652 10/28/24  1114 10/28/24  0618 10/27/24  2040 10/27/24  1608 10/27/24  1035 10/27/24  0619 10/26/24  2047 10/26/24  1549   POC GLUCOSE mg/dl 221* 110 149* 149* 209* 124 151* 160* 183* 118 125 206*               Recent Cultures (last 7 days):         Imaging Results Review: No pertinent imaging studies reviewed.  Other Study Results Review: No additional pertinent studies reviewed.    Last 24 Hours Medication List:     Current Facility-Administered Medications:     acetaminophen (TYLENOL) tablet 650 mg, Q4H PRN    amLODIPine (NORVASC) tablet 10 mg, Daily    apixaban (ELIQUIS) tablet 2.5 mg, BID    aspirin (ECOTRIN LOW STRENGTH) EC tablet 81 mg, Daily    finasteride (PROSCAR) tablet 5 mg, Daily    furosemide (LASIX) tablet 60 mg, BID (diuretic)    hydrALAZINE (APRESOLINE) injection 10 mg, Q6H PRN    insulin lispro (HumALOG/ADMELOG) 100 units/mL subcutaneous injection 1-6 Units, TID AC **AND** Fingerstick Glucose (POCT), TID AC    isosorbide dinitrate (ISORDIL) tablet 10 mg, BID    labetalol (NORMODYNE) injection 10 mg, Q6H PRN    lidocaine (LIDODERM) 5 % patch 1 patch, Daily    lisinopril (ZESTRIL) tablet 20 mg, Daily    melatonin tablet 3 mg, HS    metoprolol tartrate (LOPRESSOR) tablet 25 mg, Daily    sodium chloride (OCEAN) 0.65 % nasal spray 2 spray, 4x Daily PRN    Insert peripheral IV, Once **AND** sodium chloride (PF) 0.9 % injection 3 mL, Q1H PRN    Administrative Statements   Today, Patient Was Seen By: Jake Bell DO      **Please Note: This note may have been constructed using a voice recognition system.**

## 2024-10-29 NOTE — PROGRESS NOTES
Progress Note - Geriatric Medicine   Name: Tom Stewart 88 y.o. male I MRN: [id number]  Unit/Bed#: -01 I Date of Admission: 10/25/2024   Date of Service: 10/29/2024 I Hospital Day: 4    Assessment & Plan  Other Alzheimer's disease (HCC)  Cognitive decline  Alert and Oriented X 2, alert to self and place  States year is 2004, disoriented to situation  Baseline: AAOx2   Last MOCA completed 2022: 13/30, moderate  TSH 10/26/24: 2.547  Vit B12 2022: 1621   CT scan 4/2023: severe chronic arteriosclerosis  CT 2021 showing advanced microangiopathic change  Previously seen by [facility] in 2022 s/p referral after noted forgetfulness/confusion in ED October 2021 by son  Staged as moderate dementia; positive family history of dementia (patient's brother)  Noted progressive decline in short and long term memory s/p stroke 2017  Goal is STR to LTC placement  Encounter for delirium elderly at risk (DEAR) screening  Patient at risk for delirium secondary to age, cognitive decline,  hospitalization, dehydration, immobility  Identify and treat underlying cause  Provide frequent redirection, reorientation, distraction techniques  Avoid deliriogenic medications such as tramadol, benzodiazepines, anticholinergics,  Benadryl  Treat pain, See geriatric pain protocol  Monitor for constipation and urinary retention  Encourage early and frequent moblization, OOB  Encourage Hydration/ Nutrition  Implement sleep hygiene, limit night time interuptions, group activities  Avoid physical restraints  Use chemical restraint only when other efforts have failed, recommend zyprexa 2.5mg IM q8h prn  Monitor Qtc, if greater than 500 do not use antipsychotic medication  If QTc greater than 460, monitor and replete and deficiency of  K and Mg, recheck EKG  EKG QTc 2023: 501; would refrain from antipsychotic medication unless recheck EKG  Frailty syndrome in geriatric patient  Clinical Frail Scale: 5/6- Mildly/Moderately Frail  More  evident slowing, needs help high order IADLs (transport, bills, medications)  Progressively impairs shopping and walking outside alone, meal prep and housework  Daughter set up alarm system for medication administration, however patient not compliant  Noted days of medications still being in pill box  Home safety: continues to use riding lawnmowers, however uses RW/gait instability noted  Several area rugs in home  No shower grab bars, chair per patient   Albumin: 4.2  Acute on chronic heart failure with preserved ejection fraction (HCC)  Wt Readings from Last 3 Encounters:   10/29/24 91.3 kg (201 lb 4.5 oz)   10/22/24 95.6 kg (210 lb 12.8 oz)   09/03/24 90.2 kg (198 lb 12.8 oz)     Presented to ED with SOB, CP  BNP upon arrival 2,374  Recently seen by PCP on 10/22 for SOB and bilateral leg swelling and was suppose to increase Lasix dose to twice daily for 3 days; unsure compliance   Upon exam, SOB however 2L NC in place and O2 Sat 98%  Denies CP  CXR 10/25 showing: CHF with mild perihilar edema   Trace bilateral lower extremity edema noted  Continue Cardiac diet, fluid restriction 1800 mL   Daily weights    Mixed hyperlipidemia due to type 2 diabetes mellitus  (HCC)  Lab Results   Component Value Date    HGBA1C 7.1 (H) 06/11/2024       Recent Labs     10/28/24  1652 10/28/24  2112 10/29/24  0621 10/29/24  1044   POCGLU 149* 149* 110 221*       Blood Sugar Average: Last 72 hrs:  (P) [id number]      History of CVA (cerebrovascular accident)  Hx of CVA in 2017  Paroxysmal atrial fibrillation (HCC)  Heart rate controlled at this time  Continue Eliquis and Metoprolol  Type 2 diabetes mellitus with stage 4 chronic kidney disease, without long-term current use of insulin (HCC)  Lab Results   Component Value Date    HGBA1C 7.1 (H) 06/11/2024       Recent Labs     10/28/24  1652 10/28/24  2112 10/29/24  0621 10/29/24  1044   POCGLU 149* 149* 110 221*       Blood Sugar Average: Last 72 hrs:  (P)  159.5096137590041424    PTA: Glipizide, currently holding at this time  Continue POC Glucose  Hypoglycemia protocol  SSI TID with meals    Ambulatory dysfunction  At a baseline ambulates with RW/cane  Has four steps to enter home, two steps to the backyard  PT/OT following  Fall precautions  Out of bed as tolerated  Encourage early and frequent mobilization  Encourage adequate hydration and nutrition  Provide adequate pain management   Goal is to STR  Continue with PT/OT for continued strength and balance training   Stage 4 chronic kidney disease (HCC)  Lab Results   Component Value Date    EGFR 27 10/29/2024    EGFR 29 10/28/2024    EGFR 29 10/27/2024    CREATININE 2.08 (H) 10/29/2024    CREATININE 1.99 (H) 10/28/2024    CREATININE 2.00 (H) 10/27/2024     Appears to be at baseline  Avoid nephrotoxic medications  Avoid hypotension    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 (HCC)  Wt Readings from Last 3 Encounters:   10/29/24 91.3 kg (201 lb 4.5 oz)   10/22/24 95.6 kg (210 lb 12.8 oz)   09/03/24 90.2 kg (198 lb 12.8 oz)     BP well controlled  PTA medications: Amlodipine, Lisinopril; continuing at this time  Acute respiratory failure with hypoxia (HCC)  Mid 80's O2 saturation upon arrival   Currently 2L NC, continue to wean off  Hearing loss  Patient does have hearing impairment   Does not wear hearing aids, however hearing decreased bilaterally  Hearing impairment  correlated with depression, cognitive impairment, delirium and falls in the older adult  Speak clearly  Use sound amplifier  Speak face to face  Use clear dictation and enunciation of words  Fall  Frequent falls within the last year per daughter, however did not seek medical attention  Area rugs, steps to enter home, gait instability, dog as tripping hazards within home  [place] fall precautions   Assess patient frequently for physical needs, encourage use of assistant devices as needed and directed by PT/OT  Identify  cognitive and physical deficits and behaviors that affect risk of falls  Consider moving patient closer to nursing station to monitor more closely for impulsive behavior which may increase risk of falls  Educate patient/family on patient safety including physical limitations and importance of using call bell for assistance   Modify environment to reduce risk of injury including disconnecting from pole when not in use, ensuring adequate lighting in room and restroom, ensuring that path to restroom is clear and free of trip hazards  PT/OT consulted to assist with strengthening/mobility and assist with discharge planning to appropriate level of care    Subjective   Tom is an 88 year old male who is being seen today for evaluation with Geriatrics. Upon exam, he is laying in bed asking for the TV channel to be changed to football. He is calm and pleasant. He states his girlfriend was here this afternoon to see him. He remains at baseline cognitively, AAOx2, to self and place. He denies CP, SOB, Dizziness/lightheadedness, n/v/c/d, headaches. He has no complaints at this time.     Objective :  Temp:  [97.3 °F (36.3 °C)-97.8 °F (36.6 °C)] 97.8 °F (36.6 °C)  HR:  [49-64] 64  BP: (114-141)/(65-79) 136/79  Resp:  [13-19] 13  SpO2:  [96 %-99 %] 96 %    Physical Exam  Vitals and nursing note reviewed.   Constitutional:       Appearance: Normal appearance.   HENT:      Head: Normocephalic.      Mouth/Throat:      Mouth: Mucous membranes are dry.      Pharynx: Oropharynx is clear.   Cardiovascular:      Rate and Rhythm: Normal rate and regular rhythm.      Pulses: Normal pulses.      Heart sounds: Normal heart sounds.   Pulmonary:      Effort: Pulmonary effort is normal.      Breath sounds: Normal breath sounds.   Abdominal:      General: Abdomen is flat. Bowel sounds are normal.      Palpations: Abdomen is soft.   Musculoskeletal:         General: Normal range of motion.      Cervical back: Normal range of motion.      Right  lower leg: Edema (trace) present.      Left lower leg: Edema (trace) present.   Skin:     General: Skin is warm and dry.      Capillary Refill: Capillary refill takes less than 2 seconds.   Neurological:      Mental Status: He is alert. Mental status is at baseline.      Gait: Gait abnormal.   Psychiatric:         Mood and Affect: Mood normal.         Behavior: Behavior normal.           Lab Results: I have reviewed the following results:CBC/BMP:   .     10/29/24  0531   WBC 6.95   HGB 11.1*   HCT 35.0*      SODIUM 135   K 3.9   CL 96   CO2 33*   BUN 51*   CREATININE 2.08*   GLUC 123   MG 2.0      Imaging Results Review: No pertinent imaging studies reviewed.  Other Study Results Review: No additional pertinent studies reviewed.    Therapies:   Basic Mobility Inpatient Raw Score: 16  -[facility] Goal: 5: Stand one or more mins  -[facility] Achieved: 7: Walk 25 feet or more  PT: consulted  OT: consulted    VTE Prophylaxis: VTE covered by:  apixaban, Oral, 2.5 mg at 10/29/24 0955    and Sequential compression device (Venodyne)     Code Status: Level 3 - DNAR and DNI  Advance Directive and Living Will: Yes    Power of : Yes      Family and Social Support:   Living Arrangements: Lives w/ Spouse/significant other  Support Systems: Daughter; Spouse/significant other  Type of Current Residence: Trailer Home  Discharge planning discussed with:: Pt dtr Chanel  Freedom of Choice: Yes      Goals of Care: STR

## 2024-10-29 NOTE — PHYSICAL THERAPY NOTE
"   Physical Therapy Treatment Note    Patient Name: Tom Stewart    Diagnosis: CHF (congestive heart failure) (Formerly [facility]) [I50.9]  Chest pain [R07.9]  Elevated troponin [R79.89]  CHF exacerbation (Formerly [facility]) [I50.9]     10/29/24 0928   PT Last Visit   PT Visit Date 10/29/24   Note Type   Note Type Treatment   Pain Assessment   Pain Assessment Tool 0-10   Pain Score 4   Pain Location/Orientation Orientation: Left;Location: Hip   Pain Onset/Description Frequency: Intermittent   Hospital Pain Intervention(s) Repositioned;Ambulation/increased activity   Restrictions/Precautions   Weight Bearing Precautions Per Order No   Other Precautions Cognitive;Chair Alarm;Bed Alarm;Fall Risk;Pain   General   Chart Reviewed Yes   Response to Previous Treatment Patient unable to report, no changes reported from family or staff   Family/Caregiver Present No   Cognition   Overall Cognitive Status Impaired   Arousal/Participation Alert;Responsive;Cooperative   Attention Attends with cues to redirect   Orientation Level Oriented to person;Oriented to place;Disoriented to situation;Disoriented to time   Memory Decreased recall of recent events;Decreased short term memory   Following Commands Follows one step commands without difficulty   Comments Pt agreeable to PT.   Subjective   Subjective \"I'm tired today.\"   Bed Mobility   Supine to Sit 5  Supervision   Additional items Assist x 1;HOB elevated;Bedrails;Increased time required;Verbal cues   Transfers   Sit to Stand 4  Minimal assistance   Additional items Assist x 1;Increased time required;Verbal cues   Stand to Sit 4  Minimal assistance   Additional items Assist x 1;Armrests;Increased time required;Verbal cues   Ambulation/Elevation   Gait pattern Decreased toe off;Decreased heel strike;Decreased hip extension;Short stride;Foward flexed   Gait Assistance 4  Minimal assist   Additional items Assist x 1;Verbal cues   Assistive Device Rolling walker   Distance 80 feet   Balance   Static Sitting " Fair +   Dynamic Sitting Fair   Static Standing Fair -   Dynamic Standing Poor +   Ambulatory Poor +   Endurance Deficit   Endurance Deficit Yes   Endurance Deficit Description decreased activity tolerance   Activity Tolerance   Activity Tolerance Patient tolerated treatment well   Medical Staff Made Aware PT student Phil   Exercises   Hip Flexion Sitting;20 reps;AROM;Bilateral   Hip Abduction Sitting;20 reps;AROM;Bilateral  (long sitting)   Knee AROM Long Arc Quad Sitting;20 reps;AROM;Bilateral   Ankle Pumps Sitting;20 reps;AROM;Bilateral   Balance training  sit to stand transfers: 2 sets of 5   Assessment   Prognosis Good   Problem List Decreased strength;Decreased endurance;Impaired balance;Decreased mobility;Decreased cognition;Pain   Assessment Chart reviewed. Patient was received supine in bed in NAD and agreeable to PT session. Today's PT treatment session consisted of therapeutic activity for facilitation of transitional movements and safe performance of correct technique for bed mobility and sit to stand transfers, therapeutic exercise to increase lower extremity muscle strength, and gait training to promote safe and functional ambulation on level surfaces. In comparison to the previous session the patient has made progress as evident by ability to ambulate an increased distance. When ambulating pt exhibits decreased heel strike, decreased stride length, and a forward flexed trunk. Pt performed multiple sit to stand transfers this session with emphasis on proper hand placement and upright posture. Pt noted with decreased eccentric control with sit to stand transfers. Pt tolerated all therapeutic exercise well without complaints. Overall, patient tolerated today's session well and continues to be making progress towards achieving his STG's. Patient's prognosis for achieving their STG's is good. PT intervention continues to be appropriate as the patient continues to be limited by pain, decreased lower  extremity strength, impaired balance, decreased endurance, gait deviations, and decreased functional mobility. Continue to recommend level 2, moderate resource intensity. PT to continue to see patient in order to address the deficits listed above and provide interventions consistent with the POC in order to achieve STG's and optimize the patient's independence with functional mobility.   Barriers to Discharge Inaccessible home environment;Decreased caregiver support   Goals   STG Expiration Date 11/07/24   PT Treatment Day 2   Plan   Treatment/Interventions Functional transfer training;LE strengthening/ROM;Elevations;Therapeutic exercise;Endurance training;Patient/family training;Bed mobility;Gait training;OT   Progress Progressing toward goals   PT Frequency 3-5x/wk   Discharge Recommendation   Rehab Resource Intensity Level, PT II (Moderate Resource Intensity)   AM-PAC Basic Mobility Inpatient   Turning in Flat Bed Without Bedrails 3   Lying on Back to Sitting on Edge of Flat Bed Without Bedrails 3   Moving Bed to Chair 3   Standing Up From Chair Using Arms 3   Walk in Room 3   Climb 3-5 Stairs With Railing 1   Basic Mobility Inpatient Raw Score 16   Basic Mobility Standardized Score 38.32   [facility] Highest Level Of Mobility   -HL Goal 5: Stand one or more mins   -HLM Achieved 7: Walk 25 feet or more   Education   Education Provided Mobility training;Home exercise program;Assistive device   Patient Demonstrates acceptance/verbal understanding;Reinforcement needed   End of Consult   Patient Position at End of Consult Bedside chair;Bed/Chair alarm activated;All needs within reach  (RN made aware)     Noemí Rojas, PT, DPT    Time of PT treatment session: 0928-0952  24 minutes

## 2024-10-29 NOTE — PLAN OF CARE
Problem: PHYSICAL THERAPY ADULT  Goal: Performs mobility at highest level of function for planned discharge setting.  See evaluation for individualized goals.  Description: Treatment/Interventions: Functional transfer training, LE strengthening/ROM, Elevations, Therapeutic exercise, Endurance training, Cognitive reorientation, Patient/family training, Bed mobility, Gait training, OT  Equipment Recommended: Walker       See flowsheet documentation for full assessment, interventions and recommendations.  Outcome: Progressing  Note: Prognosis: Good  Problem List: Decreased strength, Decreased endurance, Impaired balance, Decreased mobility, Decreased cognition, Pain  Assessment: Chart reviewed. Patient was received supine in bed in NAD and agreeable to PT session. Today's PT treatment session consisted of therapeutic activity for facilitation of transitional movements and safe performance of correct technique for bed mobility and sit to stand transfers, therapeutic exercise to increase lower extremity muscle strength, and gait training to promote safe and functional ambulation on level surfaces. In comparison to the previous session the patient has made progress as evident by ability to ambulate an increased distance. When ambulating pt exhibits decreased heel strike, decreased stride length, and a forward flexed trunk. Pt performed multiple sit to stand transfers this session with emphasis on proper hand placement and upright posture. Pt noted with decreased eccentric control with sit to stand transfers. Pt tolerated all therapeutic exercise well without complaints. Overall, patient tolerated today's session well and continues to be making progress towards achieving his STG's. Patient's prognosis for achieving their STG's is good. PT intervention continues to be appropriate as the patient continues to be limited by pain, decreased lower extremity strength, impaired balance, decreased endurance, gait deviations, and  decreased functional mobility. Continue to recommend level 2, moderate resource intensity. PT to continue to see patient in order to address the deficits listed above and provide interventions consistent with the POC in order to achieve STG's and optimize the patient's independence with functional mobility.    Barriers to Discharge: Inaccessible home environment, Decreased caregiver support     Rehab Resource Intensity Level, PT: II (Moderate Resource Intensity)    See flowsheet documentation for full assessment.

## 2024-10-29 NOTE — ASSESSMENT & PLAN NOTE
Presented with chest pain/SOB.  Patient's daughter wishes to proceed with noninvasive medical management.  Continue ASA, Lopressor, and Isordil.  Not on statin due to history of intolerance.

## 2024-10-29 NOTE — ASSESSMENT & PLAN NOTE
Cognitive decline  Alert and Oriented X 2, alert to self and place  States year is 2004, disoriented to situation  Baseline: AAOx2   Last MOCA completed 2022: 13/30, moderate  TSH 10/26/24: 2.547  Vit B12 2022: 1621   CT scan 4/2023: severe chronic arteriosclerosis  CT 2021 showing advanced microangiopathic change  [facility] s/p referral after noted forgetfulness/confusion in ED October 2021 by son  Staged as moderate dementia; positive family history of dementia (patient's brother)  Noted progressive decline in short and long term memory s/p stroke 2017  Goal is STR to LTC placement

## 2024-10-29 NOTE — ASSESSMENT & PLAN NOTE
Blood Pressure: 119/63   BP stable since admission  Continue pta amlodipine and lisinopril  BP PRN meds IV in place for BP > 160

## 2024-10-29 NOTE — ASSESSMENT & PLAN NOTE
Wt Readings from Last 3 Encounters:   10/29/24 91.3 kg (201 lb 4.5 oz)   10/22/24 95.6 kg (210 lb 12.8 oz)   09/03/24 90.2 kg (198 lb 12.8 oz)   Euvolemic on exam.  I/O net -2.1 L, creatinine stable.  Transition to Lasix 60 mg po twice daily.  Recommend strict I/O's, daily weights, and sodium restriction.

## 2024-10-29 NOTE — ASSESSMENT & PLAN NOTE
Lab Results   Component Value Date    CHOLESTEROL 199 06/11/2024    TRIG 138 06/11/2024    HDL 46 06/11/2024    LDLCALC 125 (H) 06/11/2024   Hold off statin for now      Blood Sugar Average: Last 72 hrs:  (P) [id number]

## 2024-10-29 NOTE — PLAN OF CARE
Problem: PAIN - ADULT  Goal: Verbalizes/displays adequate comfort level or baseline comfort level  Description: Interventions:  - Encourage patient to monitor pain and request assistance  - Assess pain using appropriate pain scale  - Administer analgesics based on type and severity of pain and evaluate response  - Implement non-pharmacological measures as appropriate and evaluate response  - Consider cultural and social influences on pain and pain management  - Notify physician/advanced practitioner if interventions unsuccessful or patient reports new pain  Outcome: Progressing     Problem: INFECTION - ADULT  Goal: Absence or prevention of progression during hospitalization  Description: INTERVENTIONS:  - Assess and monitor for signs and symptoms of infection  - Monitor lab/diagnostic results  - Monitor all insertion sites, i.e. indwelling lines, tubes, and drains  - Monitor endotracheal if appropriate and nasal secretions for changes in amount and color  - [place] appropriate cooling/warming therapies per order  - Administer medications as ordered  - Instruct and encourage patient and family to use good hand hygiene technique  - Identify and instruct in appropriate isolation precautions for identified infection/condition  Outcome: Progressing  Goal: Absence of fever/infection during neutropenic period  Description: INTERVENTIONS:  - Monitor WBC    Outcome: Progressing     Problem: SAFETY ADULT  Goal: Patient will remain free of falls  Description: INTERVENTIONS:  - Educate patient/family on patient safety including physical limitations  - Instruct patient to call for assistance with activity   - Consult OT/PT to assist with strengthening/mobility   - Keep Call bell within reach  - Keep bed low and locked with side rails adjusted as appropriate  - Keep care items and personal belongings within reach  - Initiate and maintain comfort rounds  - Make Fall Risk Sign visible to staff  - Offer Toileting every 2 Hours,  in advance of need  - Initiate/Maintain bed alarm  - Obtain necessary fall risk management equipment: yellow socks  - Apply yellow socks and bracelet for high fall risk patients  - Consider moving patient to room near nurses station  Outcome: Progressing  Goal: Maintain or return to baseline ADL function  Description: INTERVENTIONS:  -  Assess patient's ability to carry out ADLs; assess patient's baseline for ADL function and identify physical deficits which impact ability to perform ADLs (bathing, care of mouth/teeth, toileting, grooming, dressing, etc.)  - Assess/evaluate cause of self-care deficits   - Assess range of motion  - Assess patient's mobility; develop plan if impaired  - Assess patient's need for assistive devices and provide as appropriate  - Encourage maximum independence but intervene and supervise when necessary  - Involve family in performance of ADLs  - Assess for home care needs following discharge   - Consider OT consult to assist with ADL evaluation and planning for discharge  - Provide patient education as appropriate  Outcome: Progressing  Goal: Maintains/Returns to pre admission functional level  Description: INTERVENTIONS:  - Perform AM-PAC 6 Click Basic Mobility/ Daily Activity assessment daily.  - Set and communicate daily mobility goal to care team and patient/family/caregiver.   - Collaborate with rehabilitation services on mobility goals if consulted  - Perform Range of Motion 3 times a day.  - Reposition patient every 2 hours.  - Dangle patient 3 times a day  - Stand patient 3 times a day  - Ambulate patient 3 times a day  - Out of bed to chair 3 times a day   - Out of bed for meals 3 times a day  - Out of bed for toileting  - Record patient progress and toleration of activity level   Outcome: Progressing     Problem: DISCHARGE PLANNING  Goal: Discharge to home or other facility with appropriate resources  Description: INTERVENTIONS:  - Identify barriers to discharge w/patient and  caregiver  - Arrange for needed discharge resources and transportation as appropriate  - Identify discharge learning needs (meds, wound care, etc.)  - Arrange for interpretive services to assist at discharge as needed  - Refer to Case Management Department for coordinating discharge planning if the patient needs post-hospital services based on physician/advanced practitioner order or complex needs related to functional status, cognitive ability, or social support system  Outcome: Progressing     Problem: Knowledge Deficit  Goal: Patient/family/caregiver demonstrates understanding of disease process, treatment plan, medications, and discharge instructions  Description: Complete learning assessment and assess knowledge base.  Interventions:  - Provide teaching at level of understanding  - Provide teaching via preferred learning methods  Outcome: Progressing     Problem: CARDIOVASCULAR - ADULT  Goal: Maintains optimal cardiac output and hemodynamic stability  Description: INTERVENTIONS:  - Monitor I/O, vital signs and rhythm  - Monitor for S/S and trends of decreased cardiac output  - Administer and titrate ordered vasoactive medications to optimize hemodynamic stability  - Assess quality of pulses, skin color and temperature  - Assess for signs of decreased coronary artery perfusion  - Instruct patient to report change in severity of symptoms  Outcome: Progressing  Goal: Absence of cardiac dysrhythmias or at baseline rhythm  Description: INTERVENTIONS:  - Continuous cardiac monitoring, vital signs, obtain 12 lead EKG if ordered  - Administer antiarrhythmic and heart rate control medications as ordered  - Monitor electrolytes and administer replacement therapy as ordered  Outcome: Progressing     Problem: RESPIRATORY - ADULT  Goal: Achieves optimal ventilation and oxygenation  Description: INTERVENTIONS:  - Assess for changes in respiratory status  - Assess for changes in mentation and behavior  - Position to  facilitate oxygenation and minimize respiratory effort  - Oxygen administered by appropriate delivery if ordered  - Initiate smoking cessation education as indicated  - Encourage broncho-pulmonary hygiene including cough, deep breathe, Incentive Spirometry  - Assess the need for suctioning and aspirate as needed  - Assess and instruct to report SOB or any respiratory difficulty  - Respiratory Therapy support as indicated  Outcome: Progressing     Problem: MUSCULOSKELETAL - ADULT  Goal: Maintain or return mobility to safest level of function  Description: INTERVENTIONS:  - Assess patient's ability to carry out ADLs; assess patient's baseline for ADL function and identify physical deficits which impact ability to perform ADLs (bathing, care of mouth/teeth, toileting, grooming, dressing, etc.)  - Assess/evaluate cause of self-care deficits   - Assess range of motion  - Assess patient's mobility  - Assess patient's need for assistive devices and provide as appropriate  - Encourage maximum independence but intervene and supervise when necessary  - Involve family in performance of ADLs  - Assess for home care needs following discharge   - Consider OT consult to assist with ADL evaluation and planning for discharge  - Provide patient education as appropriate  Outcome: Progressing  Goal: Maintain proper alignment of affected body part  Description: INTERVENTIONS:  - Support, maintain and protect limb and body alignment  - Provide patient/ family with appropriate education  Outcome: Progressing

## 2024-10-29 NOTE — ASSESSMENT & PLAN NOTE
Presents to ED c/o worsening SOB and chest pain/pressure.  Patient poor historian, most of history obtained from daughter  Per chart review, recently seen by PCP 10/22 patient complaining of SOB and leg swelling with reported O2 sat 81-90%  Patient was instructed to increase home Lasix to twice daily x 3 days and report to ED if O2 dropped below 90  Daughter reports patient has poor medication adherence due to dementia.  He lives at home with girlfriend who does not help him with his medication, daughter says she helps him as much as she can    Lab Results   Component Value Date    LVEF 50 08/15/2023    BNP 2,374 (H) 10/25/2024    BNP 2,419 (H) 10/22/2024     Presents with increased shortness of breath  CXR with possible pulmonary venous congestion, pending final read  NYHA Class III., Stage C  Patient is currently volume overloaded.- bilateral lower extremity pitting edema    Plan:  GDMT:  Diuretic: Lasix 60 mg PO BID  B-Blocker: Lopressor 25 mg BID, ACE/ARB/ARNi: Lisinopril 20 mg  Sodium restriction 2g  CMP, magnesium tomorrow a.m; Goal Mg > 2 and K > 4; Replete prn  HOB > 30°, Daily standing weights, Measure I/O  Consult nutrition for dietary education  Cards consulted

## 2024-10-29 NOTE — PLAN OF CARE
Problem: PAIN - ADULT  Goal: Verbalizes/displays adequate comfort level or baseline comfort level  Description: Interventions:  - Encourage patient to monitor pain and request assistance  - Assess pain using appropriate pain scale  - Administer analgesics based on type and severity of pain and evaluate response  - Implement non-pharmacological measures as appropriate and evaluate response  - Consider cultural and social influences on pain and pain management  - Notify physician/advanced practitioner if interventions unsuccessful or patient reports new pain  Outcome: Progressing     Problem: INFECTION - ADULT  Goal: Absence or prevention of progression during hospitalization  Description: INTERVENTIONS:  - Assess and monitor for signs and symptoms of infection  - Monitor lab/diagnostic results  - Monitor all insertion sites, i.e. indwelling lines, tubes, and drains  - Monitor endotracheal if appropriate and nasal secretions for changes in amount and color  - [place] appropriate cooling/warming therapies per order  - Administer medications as ordered  - Instruct and encourage patient and family to use good hand hygiene technique  - Identify and instruct in appropriate isolation precautions for identified infection/condition  Outcome: Progressing

## 2024-10-29 NOTE — ASSESSMENT & PLAN NOTE
Blood Pressure: 119/63    Plan:  Continue home meds  Monitor blood pressure  PRN IV Labetalol and Hydralazine for SBP > 160

## 2024-10-29 NOTE — ASSESSMENT & PLAN NOTE
Currently SpO2: (!) 85 % on Nasal Cannula O2 Flow Rate (L/min): 2 L/min   On room air at home  Wean oxygen as appropriate

## 2024-10-29 NOTE — ASSESSMENT & PLAN NOTE
Cr 1.94 within baseline  Continue to monitor Cr, avoid nephrotoxic agents  Lab Results   Component Value Date    EGFR 27 10/29/2024    EGFR 29 10/28/2024    EGFR 29 10/27/2024    CREATININE 2.08 (H) 10/29/2024    CREATININE 1.99 (H) 10/28/2024    CREATININE 2.00 (H) 10/27/2024   Monitor renal function

## 2024-10-29 NOTE — ASSESSMENT & PLAN NOTE
Lab Results   Component Value Date    EGFR 27 10/29/2024    EGFR 29 10/28/2024    EGFR 29 10/27/2024    CREATININE 2.08 (H) 10/29/2024    CREATININE 1.99 (H) 10/28/2024    CREATININE 2.00 (H) 10/27/2024     Appears to be at baseline  Avoid nephrotoxic medications  Avoid hypotension

## 2024-10-29 NOTE — ASSESSMENT & PLAN NOTE
Lab Results   Component Value Date    HGBA1C 7.1 (H) 06/11/2024     Recent Labs     10/28/24  1114 10/28/24  1652 10/28/24  2112 10/29/24  0621   POCGLU 209* 149* 149* 110     Blood Sugar Average: Last 72 hrs:  (P) 155  Not on statin due to reported history of intolerance.  Management per primary service.

## 2024-10-30 VITALS
BODY MASS INDEX: 28.91 KG/M2 | RESPIRATION RATE: 18 BRPM | HEART RATE: 55 BPM | DIASTOLIC BLOOD PRESSURE: 69 MMHG | SYSTOLIC BLOOD PRESSURE: 135 MMHG | TEMPERATURE: 98.1 F | WEIGHT: 201.94 LBS | HEIGHT: 70 IN | OXYGEN SATURATION: 92 %

## 2024-10-30 LAB
ANION GAP SERPL CALCULATED.3IONS-SCNC: 8 MMOL/L (ref 4–13)
BUN SERPL-MCNC: 54 MG/DL (ref 5–25)
CALCIUM SERPL-MCNC: 9.2 MG/DL (ref 8.4–10.2)
CHLORIDE SERPL-SCNC: 96 MMOL/L (ref 96–108)
CO2 SERPL-SCNC: 31 MMOL/L (ref 21–32)
CREAT SERPL-MCNC: 2.08 MG/DL (ref 0.6–1.3)
ERYTHROCYTE [DISTWIDTH] IN BLOOD BY AUTOMATED COUNT: 14.9 % (ref 11.6–15.1)
GFR SERPL CREATININE-BSD FRML MDRD: 27 ML/MIN/1.73SQ M
GLUCOSE SERPL-MCNC: 129 MG/DL (ref 65–140)
GLUCOSE SERPL-MCNC: 132 MG/DL (ref 65–140)
GLUCOSE SERPL-MCNC: 137 MG/DL (ref 65–140)
GLUCOSE SERPL-MCNC: 258 MG/DL (ref 65–140)
HCT VFR BLD AUTO: 34.2 % (ref 36.5–49.3)
HGB BLD-MCNC: 10.8 G/DL (ref 12–17)
MAGNESIUM SERPL-MCNC: 2.1 MG/DL (ref 1.9–2.7)
MCH RBC QN AUTO: 30.9 PG (ref 26.8–34.3)
MCHC RBC AUTO-ENTMCNC: 31.6 G/DL (ref 31.4–37.4)
MCV RBC AUTO: 98 FL (ref 82–98)
PLATELET # BLD AUTO: 156 THOUSANDS/UL (ref 149–390)
PMV BLD AUTO: 10.6 FL (ref 8.9–12.7)
POTASSIUM SERPL-SCNC: 3.8 MMOL/L (ref 3.5–5.3)
RBC # BLD AUTO: 3.5 MILLION/UL (ref 3.88–5.62)
SODIUM SERPL-SCNC: 135 MMOL/L (ref 135–147)
WBC # BLD AUTO: 5.82 THOUSAND/UL (ref 4.31–10.16)

## 2024-10-30 PROCEDURE — 85027 COMPLETE CBC AUTOMATED: CPT | Performed by: INTERNAL MEDICINE

## 2024-10-30 PROCEDURE — 82948 REAGENT STRIP/BLOOD GLUCOSE: CPT

## 2024-10-30 PROCEDURE — 80048 BASIC METABOLIC PNL TOTAL CA: CPT | Performed by: INTERNAL MEDICINE

## 2024-10-30 PROCEDURE — 99238 HOSP IP/OBS DSCHRG MGMT 30/<: CPT

## 2024-10-30 PROCEDURE — 99233 SBSQ HOSP IP/OBS HIGH 50: CPT | Performed by: INTERNAL MEDICINE

## 2024-10-30 PROCEDURE — 83735 ASSAY OF MAGNESIUM: CPT | Performed by: INTERNAL MEDICINE

## 2024-10-30 RX ORDER — METOPROLOL TARTRATE 25 MG/1
25 TABLET, FILM COATED ORAL DAILY
Qty: 30 TABLET | Refills: 0
Start: 2024-10-31 | End: 2024-11-30

## 2024-10-30 RX ORDER — FUROSEMIDE 20 MG/1
60 TABLET ORAL 2 TIMES DAILY
Qty: 180 TABLET | Refills: 0
Start: 2024-10-30 | End: 2024-11-11

## 2024-10-30 RX ADMIN — FINASTERIDE 5 MG: 5 TABLET, FILM COATED ORAL at 08:43

## 2024-10-30 RX ADMIN — INSULIN LISPRO 3 UNITS: 100 INJECTION, SOLUTION INTRAVENOUS; SUBCUTANEOUS at 11:47

## 2024-10-30 RX ADMIN — FUROSEMIDE 60 MG: 40 TABLET ORAL at 08:42

## 2024-10-30 RX ADMIN — AMLODIPINE BESYLATE 10 MG: 10 TABLET ORAL at 08:43

## 2024-10-30 RX ADMIN — ASPIRIN 81 MG: 81 TABLET, COATED ORAL at 08:42

## 2024-10-30 RX ADMIN — METOPROLOL TARTRATE 25 MG: 25 TABLET, FILM COATED ORAL at 08:43

## 2024-10-30 RX ADMIN — LISINOPRIL 20 MG: 20 TABLET ORAL at 08:44

## 2024-10-30 RX ADMIN — APIXABAN 2.5 MG: 2.5 TABLET, FILM COATED ORAL at 08:43

## 2024-10-30 RX ADMIN — ISOSORBIDE DINITRATE 10 MG: 10 TABLET ORAL at 08:44

## 2024-10-30 NOTE — PLAN OF CARE
Problem: PAIN - ADULT  Goal: Verbalizes/displays adequate comfort level or baseline comfort level  Description: Interventions:  - Encourage patient to monitor pain and request assistance  - Assess pain using appropriate pain scale  - Administer analgesics based on type and severity of pain and evaluate response  - Implement non-pharmacological measures as appropriate and evaluate response  - Consider cultural and social influences on pain and pain management  - Notify physician/advanced practitioner if interventions unsuccessful or patient reports new pain  Outcome: Progressing     Problem: INFECTION - ADULT  Goal: Absence or prevention of progression during hospitalization  Description: INTERVENTIONS:  - Assess and monitor for signs and symptoms of infection  - Monitor lab/diagnostic results  - Monitor all insertion sites, i.e. indwelling lines, tubes, and drains  - Monitor endotracheal if appropriate and nasal secretions for changes in amount and color  - [place] appropriate cooling/warming therapies per order  - Administer medications as ordered  - Instruct and encourage patient and family to use good hand hygiene technique  - Identify and instruct in appropriate isolation precautions for identified infection/condition  Outcome: Progressing  Goal: Absence of fever/infection during neutropenic period  Description: INTERVENTIONS:  - Monitor WBC    Outcome: Progressing     Problem: SAFETY ADULT  Goal: Patient will remain free of falls  Description: INTERVENTIONS:  - Educate patient/family on patient safety including physical limitations  - Instruct patient to call for assistance with activity   - Consult OT/PT to assist with strengthening/mobility   - Keep Call bell within reach  - Keep bed low and locked with side rails adjusted as appropriate  - Keep care items and personal belongings within reach  - Initiate and maintain comfort rounds  - Make Fall Risk Sign visible to staff  - Offer Toileting every 2 Hours,  in advance of need  - Initiate/Maintain bed alarm  - Obtain necessary fall risk management equipment  - Apply yellow socks and bracelet for high fall risk patients  - Consider moving patient to room near nurses station  Outcome: Progressing  Goal: Maintain or return to baseline ADL function  Description: INTERVENTIONS:  -  Assess patient's ability to carry out ADLs; assess patient's baseline for ADL function and identify physical deficits which impact ability to perform ADLs (bathing, care of mouth/teeth, toileting, grooming, dressing, etc.)  - Assess/evaluate cause of self-care deficits   - Assess range of motion  - Assess patient's mobility; develop plan if impaired  - Assess patient's need for assistive devices and provide as appropriate  - Encourage maximum independence but intervene and supervise when necessary  - Involve family in performance of ADLs  - Assess for home care needs following discharge   - Consider OT consult to assist with ADL evaluation and planning for discharge  - Provide patient education as appropriate  Outcome: Progressing  Goal: Maintains/Returns to pre admission functional level  Description: INTERVENTIONS:  - Perform AM-PAC 6 Click Basic Mobility/ Daily Activity assessment daily.  - Set and communicate daily mobility goal to care team and patient/family/caregiver.   - Collaborate with rehabilitation services on mobility goals if consulted  - Perform Range of Motion 3 times a day.  - Reposition patient every 2 hours.  - Dangle patient 3 times a day  - Stand patient 3 times a day  - Ambulate patient 3 times a day  - Out of bed to chair 3 times a day   - Out of bed for meals 3 times a day  - Out of bed for toileting  - Record patient progress and toleration of activity level   Outcome: Progressing     Problem: DISCHARGE PLANNING  Goal: Discharge to home or other facility with appropriate resources  Description: INTERVENTIONS:  - Identify barriers to discharge w/patient and caregiver  -  Arrange for needed discharge resources and transportation as appropriate  - Identify discharge learning needs (meds, wound care, etc.)  - Arrange for interpretive services to assist at discharge as needed  - Refer to Case Management Department for coordinating discharge planning if the patient needs post-hospital services based on physician/advanced practitioner order or complex needs related to functional status, cognitive ability, or social support system  Outcome: Progressing     Problem: Knowledge Deficit  Goal: Patient/family/caregiver demonstrates understanding of disease process, treatment plan, medications, and discharge instructions  Description: Complete learning assessment and assess knowledge base.  Interventions:  - Provide teaching at level of understanding  - Provide teaching via preferred learning methods  Outcome: Progressing     Problem: CARDIOVASCULAR - ADULT  Goal: Maintains optimal cardiac output and hemodynamic stability  Description: INTERVENTIONS:  - Monitor I/O, vital signs and rhythm  - Monitor for S/S and trends of decreased cardiac output  - Administer and titrate ordered vasoactive medications to optimize hemodynamic stability  - Assess quality of pulses, skin color and temperature  - Assess for signs of decreased coronary artery perfusion  - Instruct patient to report change in severity of symptoms  Outcome: Progressing  Goal: Absence of cardiac dysrhythmias or at baseline rhythm  Description: INTERVENTIONS:  - Continuous cardiac monitoring, vital signs, obtain 12 lead EKG if ordered  - Administer antiarrhythmic and heart rate control medications as ordered  - Monitor electrolytes and administer replacement therapy as ordered  Outcome: Progressing     Problem: RESPIRATORY - ADULT  Goal: Achieves optimal ventilation and oxygenation  Description: INTERVENTIONS:  - Assess for changes in respiratory status  - Assess for changes in mentation and behavior  - Position to facilitate oxygenation  and minimize respiratory effort  - Oxygen administered by appropriate delivery if ordered  - Initiate smoking cessation education as indicated  - Encourage broncho-pulmonary hygiene including cough, deep breathe, Incentive Spirometry  - Assess the need for suctioning and aspirate as needed  - Assess and instruct to report SOB or any respiratory difficulty  - Respiratory Therapy support as indicated  Outcome: Progressing     Problem: MUSCULOSKELETAL - ADULT  Goal: Maintain or return mobility to safest level of function  Description: INTERVENTIONS:  - Assess patient's ability to carry out ADLs; assess patient's baseline for ADL function and identify physical deficits which impact ability to perform ADLs (bathing, care of mouth/teeth, toileting, grooming, dressing, etc.)  - Assess/evaluate cause of self-care deficits   - Assess range of motion  - Assess patient's mobility  - Assess patient's need for assistive devices and provide as appropriate  - Encourage maximum independence but intervene and supervise when necessary  - Involve family in performance of ADLs  - Assess for home care needs following discharge   - Consider OT consult to assist with ADL evaluation and planning for discharge  - Provide patient education as appropriate  Outcome: Progressing  Goal: Maintain proper alignment of affected body part  Description: INTERVENTIONS:  - Support, maintain and protect limb and body alignment  - Provide patient/ family with appropriate education  Outcome: Progressing

## 2024-10-30 NOTE — ASSESSMENT & PLAN NOTE
Patient at risk for delirium secondary to age, cognitive decline,  hospitalization, dehydration, immobility  Identify and treat underlying cause  Provide frequent redirection, reorientation, distraction techniques  Avoid deliriogenic medications such as tramadol, benzodiazepines, anticholinergics,  Benadryl  Treat pain, See geriatric pain protocol  Monitor for constipation and urinary retention  Encourage early and frequent moblization, OOB  Encourage Hydration/ Nutrition  Implement sleep hygiene, limit night time interuptions, group activities  Avoid physical restraints  Use chemical restraint only when other efforts have failed, recommend zyprexa 2.5mg IM q8h prn  Monitor Qtc, if greater than 500 do not use antipsychotic medication  If QTc greater than 460, monitor and replete and deficiency of  K and Mg, recheck EKG  EKG QTc 10/25/2024: 499

## 2024-10-30 NOTE — ASSESSMENT & PLAN NOTE
Lab Results   Component Value Date    EGFR 27 10/30/2024    EGFR 27 10/29/2024    EGFR 29 10/28/2024    CREATININE 2.08 (H) 10/30/2024    CREATININE 2.08 (H) 10/29/2024    CREATININE 1.99 (H) 10/28/2024     Appears to be at baseline  Avoid nephrotoxic medications  Avoid hypotension

## 2024-10-30 NOTE — ASSESSMENT & PLAN NOTE
Wt Readings from Last 3 Encounters:   10/30/24 91.6 kg (201 lb 15.1 oz)   10/22/24 95.6 kg (210 lb 12.8 oz)   09/03/24 90.2 kg (198 lb 12.8 oz)     BP well controlled  PTA medications: Amlodipine, Lisinopril; continuing at this time

## 2024-10-30 NOTE — PROGRESS NOTES
Progress Note - Geriatric Medicine   Name: Tom Stewart 88 y.o. male I MRN: [id number]  Unit/Bed#: -01 I Date of Admission: 10/25/2024   Date of Service: 10/30/2024 I Hospital Day: 5    Assessment & Plan  Other Alzheimer's disease (HCC)  Cognitive decline  Alert and Oriented X 2, alert to self and place; alert to son at bedside and rest of children's names  States year is 2004, disoriented to situation  Baseline: AAOx2   Last MOCA completed 2022: 13/30, moderate  TSH 10/26/24: 2.547  Vit B12 2022: 1621   CT scan 4/2023: severe chronic arteriosclerosis  CT 2021 showing advanced microangiopathic change  Previously seen by [facility] in 2022 s/p referral after noted forgetfulness/confusion in ED October 2021 by son  Staged as moderate dementia; positive family history of dementia (patient's brother)  Noted progressive decline in short and long term memory s/p stroke 2017  Goal is STR to LTC placement  Encounter for delirium elderly at risk (DEAR) screening  Patient at risk for delirium secondary to age, cognitive decline,  hospitalization, dehydration, immobility  Identify and treat underlying cause  Provide frequent redirection, reorientation, distraction techniques  Avoid deliriogenic medications such as tramadol, benzodiazepines, anticholinergics,  Benadryl  Treat pain, See geriatric pain protocol  Monitor for constipation and urinary retention  Encourage early and frequent moblization, OOB  Encourage Hydration/ Nutrition  Implement sleep hygiene, limit night time interuptions, group activities  Avoid physical restraints  Use chemical restraint only when other efforts have failed, recommend zyprexa 2.5mg IM q8h prn  Monitor Qtc, if greater than 500 do not use antipsychotic medication  If QTc greater than 460, monitor and replete and deficiency of  K and Mg, recheck EKG  EKG QTc 10/25/2024: 499  Frailty syndrome in geriatric patient  Clinical Frail Scale: 5/6- Mildly/Moderately Frail  More evident  slowing, needs help high order IADLs (transport, bills, medications)  Progressively impairs shopping and walking outside alone, meal prep and housework  Daughter set up alarm system for medication administration, however patient not compliant  Noted days of medications still being in pill box  Home safety: continues to use riding lawnmowers, however uses RW/gait instability noted  Several area rugs in home  No shower grab bars, chair per patient   Albumin: 4.2  Acute on chronic heart failure with preserved ejection fraction (HCC)  Wt Readings from Last 3 Encounters:   10/30/24 91.6 kg (201 lb 15.1 oz)   10/22/24 95.6 kg (210 lb 12.8 oz)   09/03/24 90.2 kg (198 lb 12.8 oz)     Presented to ED with SOB, CP  BNP upon arrival 2,374  Recently seen by PCP on 10/22 for SOB and bilateral leg swelling and was suppose to increase Lasix dose to twice daily for 3 days; unsure compliance   Upon exam, SOB however 2L NC in place and O2 Sat 98%  Denies CP  CXR 10/25 showing: CHF with mild perihilar edema   Trace bilateral lower extremity edema noted  Continue Cardiac diet, fluid restriction 1800 mL   Daily weights    Mixed hyperlipidemia due to type 2 diabetes mellitus  (HCC)  Lab Results   Component Value Date    HGBA1C 7.1 (H) 06/11/2024       Recent Labs     10/29/24  1608 10/29/24  2136 10/30/24  0700 10/30/24  1033   POCGLU 144* 138 137 258*       Blood Sugar Average: Last 72 hrs:  (P) 160.9205188255814135      History of CVA (cerebrovascular accident)  Hx of CVA in 2017  Paroxysmal atrial fibrillation (HCC)  Heart rate controlled at this time  Continue Eliquis and Metoprolol  Type 2 diabetes mellitus with stage 4 chronic kidney disease, without long-term current use of insulin (HCC)  Lab Results   Component Value Date    HGBA1C 7.1 (H) 06/11/2024       Recent Labs     10/29/24  1608 10/29/24  2136 10/30/24  0700 10/30/24  1033   POCGLU 144* 138 137 258*       Blood Sugar Average: Last 72 hrs:  (P) 160.4487494925804193    PTA:  Glipizide, currently holding at this time  Continue POC Glucose  Hypoglycemia protocol  SSI TID with meals    Ambulatory dysfunction  At a baseline ambulates with RW/cane  Has four steps to enter home, two steps to the backyard  PT/OT following  Fall precautions  Out of bed as tolerated  Encourage early and frequent mobilization  Encourage adequate hydration and nutrition  Provide adequate pain management   Goal is to STR - will be going to [facility] today  Continue with PT/OT for continued strength and balance training   Stage 4 chronic kidney disease (HCC)  Lab Results   Component Value Date    EGFR 27 10/30/2024    EGFR 27 10/29/2024    EGFR 29 10/28/2024    CREATININE 2.08 (H) 10/30/2024    CREATININE 2.08 (H) 10/29/2024    CREATININE 1.99 (H) 10/28/2024     Appears to be at baseline  Avoid nephrotoxic medications  Avoid hypotension    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 (HCC)  Wt Readings from Last 3 Encounters:   10/30/24 91.6 kg (201 lb 15.1 oz)   10/22/24 95.6 kg (210 lb 12.8 oz)   09/03/24 90.2 kg (198 lb 12.8 oz)     BP well controlled  PTA medications: Amlodipine, Lisinopril; continuing at this time  Acute respiratory failure with hypoxia (HCC)  Mid 80's O2 saturation upon arrival   Currently 2L NC, continue to wean off  Hearing loss  Patient does have hearing impairment   Does not wear hearing aids, however hearing decreased bilaterally  Hearing impairment  correlated with depression, cognitive impairment, delirium and falls in the older adult  Speak clearly  Use sound amplifier  Speak face to face  Use clear dictation and enunciation of words  Fall  Frequent falls within the last year per daughter, however did not seek medical attention  Area rugs, steps to enter home, gait instability, dog as tripping hazards within home  [place] fall precautions   Assess patient frequently for physical needs, encourage use of assistant devices as needed and directed by PT/OT  Identify  cognitive and physical deficits and behaviors that affect risk of falls  Consider moving patient closer to nursing station to monitor more closely for impulsive behavior which may increase risk of falls  Educate patient/family on patient safety including physical limitations and importance of using call bell for assistance   Modify environment to reduce risk of injury including disconnecting from pole when not in use, ensuring adequate lighting in room and restroom, ensuring that path to restroom is clear and free of trip hazards  PT/OT consulted to assist with strengthening/mobility and assist with discharge planning to appropriate level of care    Subjective   Tom is an 88 year old male who is being seen today for evaluation with Geriatrics. Upon exam, he is sitting in the recliner, but ambulated with the RW with contact guard to the bed. He denies dizziness with standing, steady gait. His son, Rohit, is bedside. CM updated patient and son that he will be going to [facility]. He denies CP, SOB, Dizziness/lightheadedness, n/v/c/d, headaches. He has no complaints at this time.     Objective :  Temp:  [97.9 °F (36.6 °C)-98.2 °F (36.8 °C)] 97.9 °F (36.6 °C)  HR:  [47-65] 57  BP: (119-138)/(63-80) 132/80  Resp:  [18] 18  SpO2:  [82 %-99 %] 97 %  O2 Device: Nasal cannula  Nasal Cannula O2 Flow Rate (L/min):  [2 L/min] 2 L/min    Physical Exam  Vitals and nursing note reviewed.   Constitutional:       Appearance: Normal appearance.   HENT:      Head: Normocephalic.      Mouth/Throat:      Mouth: Mucous membranes are dry.      Pharynx: Oropharynx is clear.   Cardiovascular:      Rate and Rhythm: Normal rate and regular rhythm.      Pulses: Normal pulses.      Heart sounds: Normal heart sounds.   Pulmonary:      Effort: Pulmonary effort is normal.      Breath sounds: Normal breath sounds.   Abdominal:      General: Abdomen is flat. Bowel sounds are normal.      Palpations: Abdomen is soft.   Musculoskeletal:          General: Normal range of motion.      Cervical back: Normal range of motion.      Right lower leg: Edema (trace) present.      Left lower leg: Edema (+1) present.   Skin:     General: Skin is warm and dry.      Capillary Refill: Capillary refill takes less than 2 seconds.   Neurological:      Mental Status: He is alert. Mental status is at baseline.      Gait: Gait abnormal.   Psychiatric:         Mood and Affect: Mood normal.         Behavior: Behavior normal.           Lab Results: I have reviewed the following results:CBC/BMP:   .     10/30/24  0438   WBC 5.82   HGB 10.8*   HCT 34.2*      SODIUM 135   K 3.8   CL 96   CO2 31   BUN 54*   CREATININE 2.08*   GLUC 132   MG 2.1      Imaging Results Review: No pertinent imaging studies reviewed.  Other Study Results Review: No additional pertinent studies reviewed.    Therapies:   Basic Mobility Inpatient Raw Score: 18  JH-HLM Goal: 6: Walk 10 steps or more  JH-HLM Achieved: 6: Walk 10 steps or more  PT: consulted  OT: consulted    VTE Prophylaxis: VTE covered by:  apixaban, Oral, 2.5 mg at 10/30/24 0843     and Sequential compression device (Venodyne)     Code Status: Level 3 - DNAR and DNI  Advance Directive and Living Will: Yes    Power of : Yes      Family and Social Support:   No data recorded      Goals of Care: STR, PVM today

## 2024-10-30 NOTE — ASSESSMENT & PLAN NOTE
Lab Results   Component Value Date    HGBA1C 7.1 (H) 06/11/2024       Recent Labs     10/29/24  1608 10/29/24  2136 10/30/24  0700 10/30/24  1033   POCGLU 144* 138 137 258*       Blood Sugar Average: Last 72 hrs:  (P) [id number]

## 2024-10-30 NOTE — DISCHARGE INSTR - AVS FIRST PAGE
Please take:  - lasix 60 mg twice daily  - metoprolol 25 mg daily    Follow up:  -cardiology in 2-4 weeks  - PCP in 1-2 weeks

## 2024-10-30 NOTE — DISCHARGE SUMMARY
Discharge Summary - Internal Medicine   Name: Tom Stewart 88 y.o. male I MRN: [id number]  Unit/Bed#: -01 I Date of Admission: 10/25/2024   Date of Service: 10/30/2024 I Hospital Day: 5    Medical Problems       Resolved Problems  Date Reviewed: 10/22/2024   None     Aortic stenosis  Assessment & Plan  Moderate AS  Not a candidate for TAVR due to dementia and age per cardiology    Acute respiratory failure with hypoxia (HCC)  Assessment & Plan  Currently SpO2: (!) 85 % on Nasal Cannula O2 Flow Rate (L/min): 2 L/min   On room air at home  Required 2L NC -> now weaned to off     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 (HCC)  Assessment & Plan  Blood Pressure: 119/63   BP stable since admission  Continue pta amlodipine and lisinopril      Primary hypertension  Assessment & Plan  Blood Pressure: 119/63    Plan:  Continue home meds amlodipine, lisinopril, lopressor, lasix 60 mg bid    Stage 4 chronic kidney disease (HCC)  Assessment & Plan  Lab Results   Component Value Date    EGFR 27 10/29/2024    EGFR 29 10/28/2024    EGFR 29 10/27/2024    CREATININE 2.08 (H) 10/29/2024    CREATININE 1.99 (H) 10/28/2024    CREATININE 2.00 (H) 10/27/2024     Appears to be at baseline  Avoid nephrotoxic medications  Avoid hypotension    Type 2 diabetes mellitus with stage 4 chronic kidney disease, without long-term current use of insulin ([facility])  Assessment & Plan  Lab Results   Component Value Date    HGBA1C 7.1 (H) 06/11/2024       Recent Labs     10/29/24  2136 10/30/24  0700 10/30/24  1033 10/30/24  1552   POCGLU 138 137 258* 129       Blood Sugar Average: Last 72 hrs:  (P) 158.7881873015288079    - resumed home glipizide on dc    Paroxysmal atrial fibrillation (HCC)  Assessment & Plan  Stable  Continue pta eliquis and lopressor    History of CVA (cerebrovascular accident)  Assessment & Plan  Known hx; superimposed on dementia    Coronary artery disease involving native coronary artery of native  heart without angina pectoris  Assessment & Plan  Hx of CAD s/p PCI Jan 2020  C/o chest pain, EKG without ischemia. Suspected d/t CHF exacerbation  Continue pta ASA and eliquis    Mixed hyperlipidemia due to type 2 diabetes mellitus  (HCC)  Assessment & Plan  Lab Results   Component Value Date    CHOLESTEROL 199 06/11/2024    TRIG 138 06/11/2024    HDL 46 06/11/2024    LDLCALC 125 (H) 06/11/2024   Hold off statin for now      Blood Sugar Average: Last 72 hrs:  (P) 159.8863430927629113    * Acute on chronic heart failure with preserved ejection fraction (HCC)  Assessment & Plan  Presents to ED c/o worsening SOB and chest pain/pressure.  Patient poor historian, most of history obtained from daughter  Per chart review, recently seen by PCP 10/22 patient complaining of SOB and leg swelling with reported O2 sat 81-90%  Patient was instructed to increase home Lasix to twice daily x 3 days and report to ED if O2 dropped below 90  Daughter reports patient has poor medication adherence due to dementia.  He lives at home with girlfriend who does not help him with his medication, daughter says she helps him as much as she can    Lab Results   Component Value Date    LVEF 50 08/15/2023    BNP 2,374 (H) 10/25/2024    BNP 2,419 (H) 10/22/2024     Presents with increased shortness of breath  CXR with possible pulmonary venous congestion, pending final read  NYHA Class III., Stage C  Patient is currently volume overloaded.- bilateral lower extremity pitting edema    Plan:  GDMT:  Diuretic: Lasix 60 mg PO BID  B-Blocker: Lopressor 25 mg BID, ACE/ARB/ARNi: Lisinopril 20 mg  Sodium restriction 2g  CMP, magnesium tomorrow a.m; Goal Mg > 2 and K > 4; Replete prn  HOB > 30°, Daily standing weights, Measure I/O  Consult nutrition for dietary education  Given IV diuresis, discharge on home PO diuretics as above.        Discharging Physician / Practitioner: Emilie Soyeon Kim, MD  PCP: Beto Garnett DO  Admission Date:   Admission Orders  "(From admission, onward)       Ordered        10/25/24 2018  INPATIENT ADMISSION  Once                          Discharge Date: 10/30/24    Consultations During Hospital Stay:  Cardiology  Geriatrics    Procedures Performed:   no    Significant Findings / Test Results:   CHF with perihilar edema and minimal pleural effusion on CXR    Incidental Findings:   none    Test Results Pending at Discharge (will require follow up):   no     Outpatient Tests Requested:  no    Complications:  no    Reason for Admission: dyspnea 2/2 CHF exacerbation    Hospital Course:   Tom Stewart is a 88 y.o. male patient with past medical history of CHF, CAD, A-fib, diabetes, CKD 4, hypertension, Alzheimer's dementia who originally presented to the hospital on 10/25/2024 due to dyspnea.  Patient had desaturated to 80% and required nasal cannula oxygen.  Per family patient likely forgot to take medications in setting of Alzheimer and possible dietary indiscretion with salt heavy foods. Patient was evaluated by cardiology and continued on IV diuretics until euvolemic.  Then patient switched to oral diuretics with Lasix 60 mg twice daily.  Moderate aortic stenosis evaluated and patient deemed not a candidate for TAVR consideration due to dementia and advanced age.  Encourage salt restriction and dietary discretion.    Please see above list of diagnoses and related plan for additional information.     Condition at Discharge: fair    Discharge Day Visit / Exam:   Subjective: On day of discharge patient denied any shortness of breath especially with eating or talking, palpitations, chest pain, lightheadedness.  Able to ambulate with walker without desaturating as well.  Vitals: Blood Pressure: 135/69 (10/30/24 1501)  Pulse: 55 (10/30/24 1501)  Temperature: 98.1 °F (36.7 °C) (10/30/24 1501)  Temp Source: Oral (10/29/24 1501)  Respirations: 18 (10/30/24 0745)  Height: 5' 10\" (177.8 cm) (10/29/24 0532)  Weight - Scale: 91.6 kg (201 lb 15.1 oz) " (10/30/24 0600)  SpO2: 92 % (10/30/24 1501)  Physical Exam  Vitals and nursing note reviewed.   Constitutional:       General: He is not in acute distress.     Appearance: He is well-developed.   HENT:      Head: Normocephalic and atraumatic.   Eyes:      Conjunctiva/sclera: Conjunctivae normal.   Cardiovascular:      Rate and Rhythm: Normal rate and regular rhythm.      Heart sounds: No murmur heard.  Pulmonary:      Effort: Pulmonary effort is normal. No respiratory distress.      Breath sounds: Rales present.      Comments: Mild bi basilar Rales  Abdominal:      Palpations: Abdomen is soft.      Tenderness: There is no abdominal tenderness.   Musculoskeletal:         General: No swelling.      Cervical back: Neck supple.   Skin:     General: Skin is warm and dry.      Capillary Refill: Capillary refill takes less than 2 seconds.   Neurological:      Mental Status: He is alert.   Psychiatric:         Mood and Affect: Mood normal.          Discussion with Family: Updated  (son and daughter) at bedside.    Discharge instructions/Information to patient and family:   See after visit summary for information provided to patient and family.      Provisions for Follow-Up Care:  See after visit summary for information related to follow-up care and any pertinent home health orders.      Mobility at time of Discharge:   Basic Mobility Inpatient Raw Score: 18  JH-HLM Goal: 6: Walk 10 steps or more  JH-HLM Achieved: 6: Walk 10 steps or more  HLM Goal NOT achieved. Continue to encourage mobility in post discharge setting.     Disposition:   Acute Rehab at [facility]    Planned Readmission: no    Discharge Medications:  See after visit summary for reconciled discharge medications provided to patient and/or family.      Administrative Statements   Discharge Statement:  I have spent a total time of 30 minutes in caring for this patient on the day of the visit/encounter. >30 minutes of time was spent  on: Patient and family education.    **Please Note: This note may have been constructed using a voice recognition system**

## 2024-10-30 NOTE — ASSESSMENT & PLAN NOTE
At a baseline ambulates with RW/arlenee  Has four steps to enter home, two steps to the backyard  PT/OT following  Fall precautions  Out of bed as tolerated  Encourage early and frequent mobilization  Encourage adequate hydration and nutrition  Provide adequate pain management   Goal is to STR - will be going to [facility] today  Continue with PT/OT for continued strength and balance training

## 2024-10-30 NOTE — PLAN OF CARE
Problem: PAIN - ADULT  Goal: Verbalizes/displays adequate comfort level or baseline comfort level  Description: Interventions:  - Encourage patient to monitor pain and request assistance  - Assess pain using appropriate pain scale  - Administer analgesics based on type and severity of pain and evaluate response  - Implement non-pharmacological measures as appropriate and evaluate response  - Consider cultural and social influences on pain and pain management  - Notify physician/advanced practitioner if interventions unsuccessful or patient reports new pain  10/30/2024 1635 by Ana Moreno RN  Outcome: Adequate for Discharge  10/30/2024 1635 by Ana Moreno RN  Outcome: Progressing  10/30/2024 1001 by Ana Moreno RN  Outcome: Progressing     Problem: INFECTION - ADULT  Goal: Absence or prevention of progression during hospitalization  Description: INTERVENTIONS:  - Assess and monitor for signs and symptoms of infection  - Monitor lab/diagnostic results  - Monitor all insertion sites, i.e. indwelling lines, tubes, and drains  - Monitor endotracheal if appropriate and nasal secretions for changes in amount and color  - [place] appropriate cooling/warming therapies per order  - Administer medications as ordered  - Instruct and encourage patient and family to use good hand hygiene technique  - Identify and instruct in appropriate isolation precautions for identified infection/condition  10/30/2024 1635 by Ana Moreno RN  Outcome: Adequate for Discharge  10/30/2024 1635 by Ana Moreno RN  Outcome: Progressing  10/30/2024 1001 by Ana Moreno RN  Outcome: Progressing  Goal: Absence of fever/infection during neutropenic period  Description: INTERVENTIONS:  - Monitor WBC    10/30/2024 1635 by Ana Moreno RN  Outcome: Adequate for Discharge  10/30/2024 1635 by Ana Moreno RN  Outcome: Progressing  10/30/2024 1001 by Ana Moreno RN  Outcome: Progressing     Problem: SAFETY  ADULT  Goal: Patient will remain free of falls  Description: INTERVENTIONS:  - Educate patient/family on patient safety including physical limitations  - Instruct patient to call for assistance with activity   - Consult OT/PT to assist with strengthening/mobility   - Keep Call bell within reach  - Keep bed low and locked with side rails adjusted as appropriate  - Keep care items and personal belongings within reach  - Initiate and maintain comfort rounds  - Make Fall Risk Sign visible to staff  - Offer Toileting every 2 Hours, in advance of need  - Initiate/Maintain bed alarm  - Obtain necessary fall risk management equipment  - Apply yellow socks and bracelet for high fall risk patients  - Consider moving patient to room near nurses station  10/30/2024 1635 by Ana Moreno RN  Outcome: Adequate for Discharge  10/30/2024 1635 by Ana Moreno RN  Outcome: Progressing  10/30/2024 1001 by Ana Moreno RN  Outcome: Progressing  Goal: Maintain or return to baseline ADL function  Description: INTERVENTIONS:  -  Assess patient's ability to carry out ADLs; assess patient's baseline for ADL function and identify physical deficits which impact ability to perform ADLs (bathing, care of mouth/teeth, toileting, grooming, dressing, etc.)  - Assess/evaluate cause of self-care deficits   - Assess range of motion  - Assess patient's mobility; develop plan if impaired  - Assess patient's need for assistive devices and provide as appropriate  - Encourage maximum independence but intervene and supervise when necessary  - Involve family in performance of ADLs  - Assess for home care needs following discharge   - Consider OT consult to assist with ADL evaluation and planning for discharge  - Provide patient education as appropriate  10/30/2024 1635 by Ana Moreno RN  Outcome: Adequate for Discharge  10/30/2024 1635 by Ana Moreno RN  Outcome: Progressing  10/30/2024 1001 by Ana Moreno RN  Outcome:  Progressing  Goal: Maintains/Returns to pre admission functional level  Description: INTERVENTIONS:  - Perform AM-PAC 6 Click Basic Mobility/ Daily Activity assessment daily.  - Set and communicate daily mobility goal to care team and patient/family/caregiver.   - Collaborate with rehabilitation services on mobility goals if consulted  - Perform Range of Motion 3 times a day.  - Reposition patient every 2 hours.  - Dangle patient 3 times a day  - Stand patient 3 times a day  - Ambulate patient 3 times a day  - Out of bed to chair 3 times a day   - Out of bed for meals 3 times a day  - Out of bed for toileting  - Record patient progress and toleration of activity level   10/30/2024 1635 by Ana Moreno RN  Outcome: Adequate for Discharge  10/30/2024 1635 by Ana Moreno RN  Outcome: Progressing  10/30/2024 1001 by Ana Moreno RN  Outcome: Progressing     Problem: DISCHARGE PLANNING  Goal: Discharge to home or other facility with appropriate resources  Description: INTERVENTIONS:  - Identify barriers to discharge w/patient and caregiver  - Arrange for needed discharge resources and transportation as appropriate  - Identify discharge learning needs (meds, wound care, etc.)  - Arrange for interpretive services to assist at discharge as needed  - Refer to Case Management Department for coordinating discharge planning if the patient needs post-hospital services based on physician/advanced practitioner order or complex needs related to functional status, cognitive ability, or social support system  10/30/2024 1635 by Ana Moreno RN  Outcome: Adequate for Discharge  10/30/2024 1635 by Ana Moreno RN  Outcome: Progressing  10/30/2024 1001 by Ana Moreno RN  Outcome: Progressing     Problem: Knowledge Deficit  Goal: Patient/family/caregiver demonstrates understanding of disease process, treatment plan, medications, and discharge instructions  Description: Complete learning assessment and assess  knowledge base.  Interventions:  - Provide teaching at level of understanding  - Provide teaching via preferred learning methods  10/30/2024 1635 by Ana Moreno RN  Outcome: Adequate for Discharge  10/30/2024 1635 by Ana Moreno RN  Outcome: Progressing  10/30/2024 1001 by Ana Moreno RN  Outcome: Progressing     Problem: CARDIOVASCULAR - ADULT  Goal: Maintains optimal cardiac output and hemodynamic stability  Description: INTERVENTIONS:  - Monitor I/O, vital signs and rhythm  - Monitor for S/S and trends of decreased cardiac output  - Administer and titrate ordered vasoactive medications to optimize hemodynamic stability  - Assess quality of pulses, skin color and temperature  - Assess for signs of decreased coronary artery perfusion  - Instruct patient to report change in severity of symptoms  10/30/2024 1635 by Ana Moreno RN  Outcome: Adequate for Discharge  10/30/2024 1635 by Ana Moreno RN  Outcome: Progressing  10/30/2024 1001 by Ana Moreno RN  Outcome: Progressing  Goal: Absence of cardiac dysrhythmias or at baseline rhythm  Description: INTERVENTIONS:  - Continuous cardiac monitoring, vital signs, obtain 12 lead EKG if ordered  - Administer antiarrhythmic and heart rate control medications as ordered  - Monitor electrolytes and administer replacement therapy as ordered  10/30/2024 1635 by Ana Moreno RN  Outcome: Adequate for Discharge  10/30/2024 1635 by Ana Moreno RN  Outcome: Progressing  10/30/2024 1001 by Ana Moreno RN  Outcome: Progressing     Problem: RESPIRATORY - ADULT  Goal: Achieves optimal ventilation and oxygenation  Description: INTERVENTIONS:  - Assess for changes in respiratory status  - Assess for changes in mentation and behavior  - Position to facilitate oxygenation and minimize respiratory effort  - Oxygen administered by appropriate delivery if ordered  - Initiate smoking cessation education as indicated  - Encourage broncho-pulmonary  hygiene including cough, deep breathe, Incentive Spirometry  - Assess the need for suctioning and aspirate as needed  - Assess and instruct to report SOB or any respiratory difficulty  - Respiratory Therapy support as indicated  10/30/2024 1635 by Ana Moreno RN  Outcome: Adequate for Discharge  10/30/2024 1635 by Ana Moreno RN  Outcome: Progressing  10/30/2024 1001 by Ana Moreno RN  Outcome: Progressing     Problem: MUSCULOSKELETAL - ADULT  Goal: Maintain or return mobility to safest level of function  Description: INTERVENTIONS:  - Assess patient's ability to carry out ADLs; assess patient's baseline for ADL function and identify physical deficits which impact ability to perform ADLs (bathing, care of mouth/teeth, toileting, grooming, dressing, etc.)  - Assess/evaluate cause of self-care deficits   - Assess range of motion  - Assess patient's mobility  - Assess patient's need for assistive devices and provide as appropriate  - Encourage maximum independence but intervene and supervise when necessary  - Involve family in performance of ADLs  - Assess for home care needs following discharge   - Consider OT consult to assist with ADL evaluation and planning for discharge  - Provide patient education as appropriate  10/30/2024 1635 by Ana Moreno RN  Outcome: Adequate for Discharge  10/30/2024 1635 by Ana Moreno RN  Outcome: Progressing  10/30/2024 1001 by Ana Moreno RN  Outcome: Progressing  Goal: Maintain proper alignment of affected body part  Description: INTERVENTIONS:  - Support, maintain and protect limb and body alignment  - Provide patient/ family with appropriate education  10/30/2024 1635 by Ana Moreno RN  Outcome: Adequate for Discharge  10/30/2024 1635 by Ana Moreno RN  Outcome: Progressing  10/30/2024 1001 by Ana Moreno RN  Outcome: Progressing

## 2024-10-30 NOTE — ASSESSMENT & PLAN NOTE
Cognitive decline  Alert and Oriented X 2, alert to self and place; alert to son at bedside and rest of children's names  States year is 2004, disoriented to situation  Baseline: AAOx2   Last MOCA completed 2022: 13/30, moderate  TSH 10/26/24: 2.547  Vit B12 2022: 1621   CT scan 4/2023: severe chronic arteriosclerosis  CT 2021 showing advanced microangiopathic change  Previously seen by [facility] in 2022 s/p referral after noted forgetfulness/confusion in ED October 2021 by son  Staged as moderate dementia; positive family history of dementia (patient's brother)  Noted progressive decline in short and long term memory s/p stroke 2017  Goal is STR to LTC placement

## 2024-10-30 NOTE — CASE MANAGEMENT
Case Management Discharge Planning Note    Patient name Tom Stewart  Location /-01 MRN [id number]  : 1935 Date 10/30/2024       Current Admission Date: 10/25/2024  Current Admission Diagnosis:Acute on chronic heart failure with preserved ejection fraction (HCC)   Patient Active Problem List    Diagnosis Date Noted Date Diagnosed    Encounter for delirium elderly at risk (DEAR) screening 10/28/2024     Frailty syndrome in geriatric patient 10/28/2024     Aortic stenosis 10/28/2024     Acute respiratory failure with hypoxia (HCC) 10/25/2024     Sebaceous cyst 2024     Nose ulceration 2024     Primary hypertension 2023     Acute on chronic heart failure with preserved ejection fraction (HCC) 2023     Stage 4 chronic kidney disease (HCC) 2023     Gait instability 2022     Hearing loss 2022     Other Alzheimer's disease (HCC) 2022     Other proteinuria 2021     Vitamin D deficiency 2021     Ambulatory dysfunction 2021     Fall 2020     Type 2 diabetes mellitus with stage 4 chronic kidney disease, without long-term current use of insulin (HCC) 2019     Paroxysmal atrial fibrillation (HCC) 2019     History of CVA (cerebrovascular accident) 2019     Mixed hyperlipidemia due to type 2 diabetes mellitus  (HCC) 2017     Coronary artery disease involving native coronary artery of native heart without angina pectoris 2017     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 (HCC) 2017       LOS (days): 5  Geometric Mean LOS (GMLOS) (days): 3.9  Days to GMLOS:-0.8     OBJECTIVE:  Risk of Unplanned Readmission Score: 15.52         Current admission status: Inpatient   Preferred Pharmacy:   [id number]  [place] - [address]    [facility] [place] [address]  Phone: [phone] Fax: [phone]    [facility] -  [place], [place] - [address]  [address]  [address]  [facility] [address]  Phone: [phone] Fax: [phone]    [facility] - [place], [place] - [address]  [address]  [address]  [facility] [address]  Phone: [phone] Fax: [phone]    [id number] - [place] - [address]  [address]  [facility] [address]  Phone: [phone] Fax: [phone]    Primary Care Provider: Beto Garnett DO    Primary Insurance: MEDICARE  Secondary Insurance: [employer]    DISCHARGE DETAILS:    Discharge planning discussed with:: pt ronn Bee at bedside, and dtr over the phone  Freedom of Choice: Yes  Comments - Freedom of Choice: As per SLIM rounds, pt is medically cleared for dc. CM reached out to Yani at [facility] who is able to accept pt today. S transport arranged for a 3pm . Fmaily is aware and in agreement. CM continues to follow and assist with pt dc plans.  CM contacted family/caregiver?: Yes  Were Treatment Team discharge recommendations reviewed with patient/caregiver?: Yes  Did patient/caregiver verbalize understanding of patient care needs?: N/A- going to facility  Were patient/caregiver advised of the risks associated with not following Treatment Team discharge recommendations?: Yes    Contacts  Patient Contacts: Rohit  Relationship to Patient:: Family  Contact Method: In Person  Reason/Outcome: Continuity of Care, Discharge Planning, Emergency Contact, Referral    Requested Home Health Care         Is the patient interested in HHC at discharge?: No    DME Referral Provided  Referral made for DME?: No    Other Referral/Resources/Interventions Provided:  Interventions: Short Term Rehab    Would you like to participate in our [facility] service program?  : No - Declined    Treatment Team Recommendation: Short Term Rehab  Discharge Destination Plan:: Short Term Rehab  Transport at Discharge : S Ambulance           ETA of  Transport (Date): 10/30/24  ETA of Transport (Time): 1500              IMM Given (Date):: 10/30/24  IMM Given to:: Family  Family notified:: Son Rohit  Additional Comments: IMM reviewed with pt son at bedside. Pt son expressed full and complete understanidng. Verbal consent obtained copy given and original in MR.

## 2024-10-30 NOTE — ASSESSMENT & PLAN NOTE
Wt Readings from Last 3 Encounters:   10/30/24 91.6 kg (201 lb 15.1 oz)   10/22/24 95.6 kg (210 lb 12.8 oz)   09/03/24 90.2 kg (198 lb 12.8 oz)     Presented to ED with SOB, CP  BNP upon arrival 2,374  Recently seen by PCP on 10/22 for SOB and bilateral leg swelling and was suppose to increase Lasix dose to twice daily for 3 days; unsure compliance   Upon exam, SOB however 2L NC in place and O2 Sat 98%  Denies CP  CXR 10/25 showing: CHF with mild perihilar edema   Trace bilateral lower extremity edema noted  Continue Cardiac diet, fluid restriction 1800 mL   Daily weights

## 2024-10-30 NOTE — ASSESSMENT & PLAN NOTE
Lab Results   Component Value Date    HGBA1C 7.1 (H) 06/11/2024       Recent Labs     10/29/24  1608 10/29/24  2136 10/30/24  0700 10/30/24  1033   POCGLU 144* 138 137 258*       Blood Sugar Average: Last 72 hrs:  (P) 160.4118215060134591    PTA: Glipizide, currently holding at this time  Continue POC Glucose  Hypoglycemia protocol  SSI TID with meals

## 2024-10-30 NOTE — ASSESSMENT & PLAN NOTE
Frequent falls within the last year per daughter, however did not seek medical attention  Area rugs, steps to enter home, gait instability, dog as tripping hazards within home  [place] fall precautions   Assess patient frequently for physical needs, encourage use of assistant devices as needed and directed by PT/OT  Identify cognitive and physical deficits and behaviors that affect risk of falls  Consider moving patient closer to nursing station to monitor more closely for impulsive behavior which may increase risk of falls  Educate patient/family on patient safety including physical limitations and importance of using call bell for assistance   Modify environment to reduce risk of injury including disconnecting from pole when not in use, ensuring adequate lighting in room and restroom, ensuring that path to restroom is clear and free of trip hazards  PT/OT consulted to assist with strengthening/mobility and assist with discharge planning to appropriate level of care

## 2024-10-30 NOTE — PLAN OF CARE
Problem: PAIN - ADULT  Goal: Verbalizes/displays adequate comfort level or baseline comfort level  Description: Interventions:  - Encourage patient to monitor pain and request assistance  - Assess pain using appropriate pain scale  - Administer analgesics based on type and severity of pain and evaluate response  - Implement non-pharmacological measures as appropriate and evaluate response  - Consider cultural and social influences on pain and pain management  - Notify physician/advanced practitioner if interventions unsuccessful or patient reports new pain  Outcome: Progressing     Problem: INFECTION - ADULT  Goal: Absence or prevention of progression during hospitalization  Description: INTERVENTIONS:  - Assess and monitor for signs and symptoms of infection  - Monitor lab/diagnostic results  - Monitor all insertion sites, i.e. indwelling lines, tubes, and drains  - Monitor endotracheal if appropriate and nasal secretions for changes in amount and color  - [place] appropriate cooling/warming therapies per order  - Administer medications as ordered  - Instruct and encourage patient and family to use good hand hygiene technique  - Identify and instruct in appropriate isolation precautions for identified infection/condition  Outcome: Progressing  Goal: Absence of fever/infection during neutropenic period  Description: INTERVENTIONS:  - Monitor WBC    Outcome: Progressing     Problem: SAFETY ADULT  Goal: Patient will remain free of falls  Description: INTERVENTIONS:  - Educate patient/family on patient safety including physical limitations  - Instruct patient to call for assistance with activity   - Consult OT/PT to assist with strengthening/mobility   - Keep Call bell within reach  - Keep bed low and locked with side rails adjusted as appropriate  - Keep care items and personal belongings within reach  - Initiate and maintain comfort rounds  - Make Fall Risk Sign visible to staff  - Offer Toileting every 2 Hours,  in advance of need  - Initiate/Maintain alarm  - Obtain necessary fall risk management equipment  - Apply yellow socks and bracelet for high fall risk patients  - Consider moving patient to room near nurses station  Outcome: Progressing  Goal: Maintain or return to baseline ADL function  Description: INTERVENTIONS:  -  Assess patient's ability to carry out ADLs; assess patient's baseline for ADL function and identify physical deficits which impact ability to perform ADLs (bathing, care of mouth/teeth, toileting, grooming, dressing, etc.)  - Assess/evaluate cause of self-care deficits   - Assess range of motion  - Assess patient's mobility; develop plan if impaired  - Assess patient's need for assistive devices and provide as appropriate  - Encourage maximum independence but intervene and supervise when necessary  - Involve family in performance of ADLs  - Assess for home care needs following discharge   - Consider OT consult to assist with ADL evaluation and planning for discharge  - Provide patient education as appropriate  Outcome: Progressing  Goal: Maintains/Returns to pre admission functional level  Description: INTERVENTIONS:  - Perform AM-PAC 6 Click Basic Mobility/ Daily Activity assessment daily.  - Set and communicate daily mobility goal to care team and patient/family/caregiver.   - Collaborate with rehabilitation services on mobility goals if consulted  - Perform Range of Motion   - Reposition patient   - Dangle patient   - Stand patient   - Ambulate patient   - Out of bed to chair   - Out of bed for meals   - Out of bed for toileting  - Record patient progress and toleration of activity level   Outcome: Progressing     Problem: DISCHARGE PLANNING  Goal: Discharge to home or other facility with appropriate resources  Description: INTERVENTIONS:  - Identify barriers to discharge w/patient and caregiver  - Arrange for needed discharge resources and transportation as appropriate  - Identify discharge  learning needs (meds, wound care, etc.)  - Arrange for interpretive services to assist at discharge as needed  - Refer to Case Management Department for coordinating discharge planning if the patient needs post-hospital services based on physician/advanced practitioner order or complex needs related to functional status, cognitive ability, or social support system  Outcome: Progressing     Problem: Knowledge Deficit  Goal: Patient/family/caregiver demonstrates understanding of disease process, treatment plan, medications, and discharge instructions  Description: Complete learning assessment and assess knowledge base.  Interventions:  - Provide teaching at level of understanding  - Provide teaching via preferred learning methods  Outcome: Progressing     Problem: CARDIOVASCULAR - ADULT  Goal: Maintains optimal cardiac output and hemodynamic stability  Description: INTERVENTIONS:  - Monitor I/O, vital signs and rhythm  - Monitor for S/S and trends of decreased cardiac output  - Administer and titrate ordered vasoactive medications to optimize hemodynamic stability  - Assess quality of pulses, skin color and temperature  - Assess for signs of decreased coronary artery perfusion  - Instruct patient to report change in severity of symptoms  Outcome: Progressing  Goal: Absence of cardiac dysrhythmias or at baseline rhythm  Description: INTERVENTIONS:  - Continuous cardiac monitoring, vital signs, obtain 12 lead EKG if ordered  - Administer antiarrhythmic and heart rate control medications as ordered  - Monitor electrolytes and administer replacement therapy as ordered  Outcome: Progressing     Problem: RESPIRATORY - ADULT  Goal: Achieves optimal ventilation and oxygenation  Description: INTERVENTIONS:  - Assess for changes in respiratory status  - Assess for changes in mentation and behavior  - Position to facilitate oxygenation and minimize respiratory effort  - Oxygen administered by appropriate delivery if ordered  -  Initiate smoking cessation education as indicated  - Encourage broncho-pulmonary hygiene including cough, deep breathe, Incentive Spirometry  - Assess the need for suctioning and aspirate as needed  - Assess and instruct to report SOB or any respiratory difficulty  - Respiratory Therapy support as indicated  Outcome: Progressing     Problem: MUSCULOSKELETAL - ADULT  Goal: Maintain or return mobility to safest level of function  Description: INTERVENTIONS:  - Assess patient's ability to carry out ADLs; assess patient's baseline for ADL function and identify physical deficits which impact ability to perform ADLs (bathing, care of mouth/teeth, toileting, grooming, dressing, etc.)  - Assess/evaluate cause of self-care deficits   - Assess range of motion  - Assess patient's mobility  - Assess patient's need for assistive devices and provide as appropriate  - Encourage maximum independence but intervene and supervise when necessary  - Involve family in performance of ADLs  - Assess for home care needs following discharge   - Consider OT consult to assist with ADL evaluation and planning for discharge  - Provide patient education as appropriate  Outcome: Progressing  Goal: Maintain proper alignment of affected body part  Description: INTERVENTIONS:  - Support, maintain and protect limb and body alignment  - Provide patient/ family with appropriate education  Outcome: Progressing

## 2024-10-31 ENCOUNTER — PATIENT OUTREACH (OUTPATIENT)
Dept: CASE MANAGEMENT | Facility: OTHER | Age: 89
End: 2024-10-31

## 2024-10-31 ENCOUNTER — TRANSITIONAL CARE MANAGEMENT (OUTPATIENT)
Age: 89
End: 2024-10-31

## 2024-10-31 NOTE — ASSESSMENT & PLAN NOTE
Currently SpO2: (!) 85 % on Nasal Cannula O2 Flow Rate (L/min): 2 L/min   On room air at home  Required 2L NC -> now weaned to off

## 2024-10-31 NOTE — ASSESSMENT & PLAN NOTE
Blood Pressure: 119/63    Plan:  Continue home meds amlodipine, lisinopril, lopressor, lasix 60 mg bid

## 2024-10-31 NOTE — ASSESSMENT & PLAN NOTE
Lab Results   Component Value Date    HGBA1C 7.1 (H) 06/11/2024       Recent Labs     10/29/24  2136 10/30/24  0700 10/30/24  1033 10/30/24  1552   POCGLU 138 137 258* 129       Blood Sugar Average: Last 72 hrs:  (P) [id number]    - resumed home glipizide on dc

## 2024-10-31 NOTE — PROGRESS NOTES
Continue Viibryd 40 mg at bedtime.  Start Abilify 5 mg daily.   Follow up in 4 weeks.     Jackie Robbins MD     Outpatient Care Management [facility]. Discharged 10/30/24 to [facility]. Email sent to facility to inform them the patient is on the [facility] and I will be following them during their skilled stay.

## 2024-10-31 NOTE — TELEPHONE ENCOUNTER
Spoke to Barrie patient's daughter. Patient is now in rehab and then will be in a long term facility because of his dementia.  Barrie stated that if anything changes she will call us for an appt.

## 2024-10-31 NOTE — ASSESSMENT & PLAN NOTE
Presents to ED c/o worsening SOB and chest pain/pressure.  Patient poor historian, most of history obtained from daughter  Per chart review, recently seen by PCP 10/22 patient complaining of SOB and leg swelling with reported O2 sat 81-90%  Patient was instructed to increase home Lasix to twice daily x 3 days and report to ED if O2 dropped below 90  Daughter reports patient has poor medication adherence due to dementia.  He lives at home with girlfriend who does not help him with his medication, daughter says she helps him as much as she can    Lab Results   Component Value Date    LVEF 50 08/15/2023    BNP 2,374 (H) 10/25/2024    BNP 2,419 (H) 10/22/2024     Presents with increased shortness of breath  CXR with possible pulmonary venous congestion, pending final read  NYHA Class III., Stage C  Patient is currently volume overloaded.- bilateral lower extremity pitting edema    Plan:  GDMT:  Diuretic: Lasix 60 mg PO BID  B-Blocker: Lopressor 25 mg BID, ACE/ARB/ARNi: Lisinopril 20 mg  Sodium restriction 2g  CMP, magnesium tomorrow a.m; Goal Mg > 2 and K > 4; Replete prn  HOB > 30°, Daily standing weights, Measure I/O  Consult nutrition for dietary education  Given IV diuresis, discharge on home PO diuretics as above.

## 2024-10-31 NOTE — ASSESSMENT & PLAN NOTE
Lab Results   Component Value Date    CHOLESTEROL 199 06/11/2024    TRIG 138 06/11/2024    HDL 46 06/11/2024    LDLCALC 125 (H) 06/11/2024   Hold off statin for now      Blood Sugar Average: Last 72 hrs:  (P) [id number]

## 2024-11-01 ENCOUNTER — TELEPHONE (OUTPATIENT)
Dept: CARDIOLOGY CLINIC | Facility: CLINIC | Age: 89
End: 2024-11-01

## 2024-11-01 NOTE — TELEPHONE ENCOUNTER
Self-management/education and teach back:  Primary learner: daughter Chanel  Following low sodium diet: 2 gram NA  Following fluid restriction:  Hospital discharge weight: 201 lb 15.1 oz (10/30/24 0600)   Weighing daily: Advised daughter that [place] should be weighing patient daily.  Advised that if patient gains 3 lbs over night or 5 lbs in one week, [place] should call [facility] cardiology for further instruction.  Provided with phone number.           Recordinst home weight       Weight today: unkown  Monitoring symptoms: Discussed with daughter heart failure symptoms to look out for.    Any current symptoms: Unknown, states she is leaving to visit father soon  Knows when to call provider: reviewed reasons to call provider, provided with phone number to call  Medication reviewed and taking all as prescribed: patient is at [facility], Diuretic: Lasix 60 mg PO BID  B-Blocker: Lopressor 25 mg BID, ACE/ARB/ARNi: Lisinopril 20 mg  Knows name of diuretic:  Escalation plan: Reviewed with daughter  HF education reviewed/reinforced including low sodium diet, 64 oz fluid restriction, activity, symptoms of decompensation and when and who to call.     Care Coordination:  Aware of cardiology follow up appointment: Daughter states she was notified by [facility] that the patient had an appointment at 8:00 am.  States it is too early and patient will not be up for it.  States he is at [facility], requests virtual visit.  Scheduled patient for virtual appointment .    Aware of PCP follow up appointment:   Transportation: Discussed with patient that [facility] usually has means of transporting patients to outside provider appointments   Social Support:  Insurance/financial concerns:  Home health care:   Health literacy:  Engagement:  Personal Goal:  Additional comments:

## 2024-11-06 NOTE — TELEPHONE ENCOUNTER
Called [facility] for follow up.  Spoke with nurse Marsh.  Patient discharged from [facility] 10/30/24.     Self-management/education and teach back:  Primary learner: Nurse Marsh  Following low sodium diet: Yes  Following fluid restriction: Yes  Hospital discharge weight: 201 lb 15.1 oz  Weighing daily: Yes          Recording: Yes  1st home weight       Weight today: 200.7 lb  Monitoring symptoms: Yes  Any current symptoms:  States none reported to her  Knows when to call provider:   Medication reviewed and taking all as prescribed:  Knows name of diuretic: Yes  Escalation plan: Reviewed  HF education reviewed/reinforced including low sodium diet, 64 oz fluid restriction, activity, symptoms of decompensation and when and who to call.     Care Coordination:  Aware of cardiology follow up appointment: Made aware patient has virtual appointment 11/11, daughter will be assisting  Aware of PCP follow up appointment:  Transportation:  Social Support:  Insurance/financial concerns:  Home health care:   Health literacy:  Engagement:  Personal Goal:  Additional comments:

## 2024-11-10 PROBLEM — E78.2 MIXED HYPERLIPIDEMIA: Status: ACTIVE | Noted: 2017-11-24

## 2024-11-10 PROBLEM — I50.32 CHRONIC HEART FAILURE WITH PRESERVED EJECTION FRACTION (HCC): Status: ACTIVE | Noted: 2023-08-14

## 2024-11-10 PROBLEM — I13.0 HYPERTENSIVE HEART AND RENAL DISEASE WITH CONGESTIVE HEART FAILURE (HCC): Chronic | Status: RESOLVED | Noted: 2017-11-24 | Resolved: 2024-11-10

## 2024-11-11 ENCOUNTER — TELEMEDICINE (OUTPATIENT)
Dept: CARDIOLOGY CLINIC | Facility: CLINIC | Age: 89
End: 2024-11-11
Payer: MEDICARE

## 2024-11-11 VITALS
RESPIRATION RATE: 16 BRPM | DIASTOLIC BLOOD PRESSURE: 61 MMHG | HEART RATE: 70 BPM | OXYGEN SATURATION: 96 % | BODY MASS INDEX: 28.98 KG/M2 | HEIGHT: 70 IN | SYSTOLIC BLOOD PRESSURE: 128 MMHG

## 2024-11-11 DIAGNOSIS — Z86.73 HISTORY OF CVA (CEREBROVASCULAR ACCIDENT): Chronic | ICD-10-CM

## 2024-11-11 DIAGNOSIS — I48.19 PERSISTENT ATRIAL FIBRILLATION (HCC): ICD-10-CM

## 2024-11-11 DIAGNOSIS — I50.32 CHRONIC HEART FAILURE WITH PRESERVED EJECTION FRACTION (HCC): Primary | ICD-10-CM

## 2024-11-11 DIAGNOSIS — I25.10 CORONARY ARTERY DISEASE INVOLVING NATIVE CORONARY ARTERY OF NATIVE HEART WITHOUT ANGINA PECTORIS: Chronic | ICD-10-CM

## 2024-11-11 DIAGNOSIS — E78.2 MIXED HYPERLIPIDEMIA: ICD-10-CM

## 2024-11-11 DIAGNOSIS — I10 PRIMARY HYPERTENSION: Chronic | ICD-10-CM

## 2024-11-11 DIAGNOSIS — I35.0 AORTIC VALVE STENOSIS, ETIOLOGY OF CARDIAC VALVE DISEASE UNSPECIFIED: ICD-10-CM

## 2024-11-11 PROCEDURE — 99443 PR PHYS/QHP TELEPHONE EVALUATION 21-30 MIN: CPT

## 2024-11-11 RX ORDER — FUROSEMIDE 20 MG/1
80 TABLET ORAL 2 TIMES DAILY
Start: 2024-11-11 | End: 2024-12-11

## 2024-11-11 NOTE — ASSESSMENT & PLAN NOTE
Wt Readings from Last 3 Encounters:   10/30/24 91.6 kg (201 lb 15.1 oz)   10/22/24 95.6 kg (210 lb 12.8 oz)   09/03/24 90.2 kg (198 lb 12.8 oz)   Increase Lasix to 80 mg po bid.  Plan for BMP in 1 week.  Encourage low-sodium diet, daily weights, LE compression stockings, and LE elevation.  Recommend activity as tolerated.  Advised to notify office for weight gain or any new/worsening symptoms.

## 2024-11-11 NOTE — PROGRESS NOTES
Virtual Brief Visit  Name: Tom Stewart      : 1935      MRN: [id number]  Encounter Provider: Ignacio Richards PA-C  Encounter Date: 2024   Encounter department: [facility]    This Visit is being completed by telephone. The Patient is located at Home and in the following state in which I hold an active license [place]    The patient was identified by name and date of birth. Tom Stewart was informed that this is a telemedicine visit and that the visit is being conducted through Telephone.  My office door was closed. No one else was in the room.  Daughter (Chanel) acknowledged consent and understanding of privacy and security of the telephone platform. The patient has agreed to participate and understands they can discontinue the visit at any time.    Patient/family is aware this is a billable service.     Assessment & Plan  Chronic heart failure with preserved ejection fraction (HCC)  Wt Readings from Last 3 Encounters:   10/30/24 91.6 kg (201 lb 15.1 oz)   10/22/24 95.6 kg (210 lb 12.8 oz)   24 90.2 kg (198 lb 12.8 oz)   Increase Lasix to 80 mg po bid.  Plan for BMP in 1 week.  Encourage low-sodium diet, daily weights, LE compression stockings, and LE elevation.  Recommend activity as tolerated.  Advised to notify office for weight gain or any new/worsening symptoms.  Coronary artery disease involving native coronary artery of native heart without angina pectoris  Continue ASA, Lopressor, and Isordil.  Not on statin due to history of allergy.  Persistent atrial fibrillation (HCC)  [id number] at least 7.  Continue Lopressor and Eliquis 2.5 mg bid (age, creatinine).  Orders:    Ambulatory referral to Cardiology    Aortic valve stenosis, etiology of cardiac valve disease unspecified  Moderate aortic stenosis per most recent TTE.  Patient/family previously opted for conservative management given advanced age in setting of multiple comorbidities as well  as dementia.  Primary hypertension  Well-controlled.  Encourage ambulatory monitoring and low-sodium diet.  Continue amlodipine, lisinopril, Lopressor, Lasix, and Isordil.  Mixed hyperlipidemia  Not on statin due to history of allergy.  Continue dietary control and periodic surveillance.  History of CVA (cerebrovascular accident)  On ASA and Eliquis.        History of Present Illness   Tom Stewart is a 88-year-old male with history of chronic HFpEF, CAD, paroxysmal atrial fibrillation on low-dose anticoagulation, moderate aortic stenosis, hypertension, hyperlipidemia, CVA, and dementia who presents for telephone encounter with daughter who assisted in providing history.  Patient has been experiencing increased oxygen requirements and associated lower extremity edema.  Unfortunately, his weight has not been checked in about 1 week.  Endorses strict compliance to all medications.  Denies any additional complaints at this time.  Follows with Dr. Sweeney as primary cardiologist.    Of note, patient's daughter states medical information should not be discussed with Shirley Zhao.       Visit Time  Total Visit Duration: 25 minutes

## 2024-11-11 NOTE — ASSESSMENT & PLAN NOTE
[id number] at least 7.  Continue Lopressor and Eliquis 2.5 mg bid (age, creatinine).  Orders:    Ambulatory referral to Cardiology

## 2024-11-11 NOTE — ASSESSMENT & PLAN NOTE
Moderate aortic stenosis per most recent TTE.  Patient/family previously opted for conservative management given advanced age in setting of multiple comorbidities as well as dementia.

## 2024-11-11 NOTE — ASSESSMENT & PLAN NOTE
Well-controlled.  Encourage ambulatory monitoring and low-sodium diet.  Continue amlodipine, lisinopril, Lopressor, Lasix, and Isordil.

## 2024-11-12 ENCOUNTER — PATIENT OUTREACH (OUTPATIENT)
Dept: CASE MANAGEMENT | Facility: OTHER | Age: 89
End: 2024-11-12

## 2024-11-18 ENCOUNTER — HOSPITAL ENCOUNTER (EMERGENCY)
Facility: HOSPITAL | Age: 89
Discharge: HOME | End: 2024-11-18
Attending: EMERGENCY MEDICINE
Payer: MEDICARE

## 2024-11-18 ENCOUNTER — PATIENT OUTREACH (OUTPATIENT)
Dept: CASE MANAGEMENT | Facility: OTHER | Age: 89
End: 2024-11-18

## 2024-11-18 ENCOUNTER — APPOINTMENT (EMERGENCY)
Dept: RADIOLOGY | Facility: HOSPITAL | Age: 89
End: 2024-11-18
Payer: MEDICARE

## 2024-11-18 VITALS
RESPIRATION RATE: 26 BRPM | OXYGEN SATURATION: 97 % | HEART RATE: 76 BPM | DIASTOLIC BLOOD PRESSURE: 86 MMHG | SYSTOLIC BLOOD PRESSURE: 137 MMHG | TEMPERATURE: 96.4 F

## 2024-11-18 DIAGNOSIS — R09.02 HYPOXIA: Primary | ICD-10-CM

## 2024-11-18 DIAGNOSIS — R60.0 BILATERAL LOWER EXTREMITY EDEMA: ICD-10-CM

## 2024-11-18 LAB
2HR DELTA HS TROPONIN: -17 NG/L
ALBUMIN SERPL BCG-MCNC: 3.8 G/DL (ref 3.5–5)
ALP SERPL-CCNC: 43 U/L (ref 34–104)
ALT SERPL W P-5'-P-CCNC: 10 U/L (ref 7–52)
ANION GAP SERPL CALCULATED.3IONS-SCNC: 8 MMOL/L (ref 4–13)
AST SERPL W P-5'-P-CCNC: 12 U/L (ref 13–39)
ATRIAL RATE: 19 BPM
ATRIAL RATE: 312 BPM
BASOPHILS # BLD AUTO: 0.02 THOUSANDS/ÂΜL (ref 0–0.1)
BASOPHILS NFR BLD AUTO: 0 % (ref 0–1)
BILIRUB SERPL-MCNC: 0.6 MG/DL (ref 0.2–1)
BNP SERPL-MCNC: 2979 PG/ML (ref 0–100)
BUN SERPL-MCNC: 47 MG/DL (ref 5–25)
CALCIUM SERPL-MCNC: 9.8 MG/DL (ref 8.4–10.2)
CARDIAC TROPONIN I PNL SERPL HS: 76 NG/L (ref ?–50)
CARDIAC TROPONIN I PNL SERPL HS: 93 NG/L (ref ?–50)
CHLORIDE SERPL-SCNC: 94 MMOL/L (ref 96–108)
CO2 SERPL-SCNC: 34 MMOL/L (ref 21–32)
CREAT SERPL-MCNC: 2.16 MG/DL (ref 0.6–1.3)
EOSINOPHIL # BLD AUTO: 0.04 THOUSAND/ÂΜL (ref 0–0.61)
EOSINOPHIL NFR BLD AUTO: 1 % (ref 0–6)
ERYTHROCYTE [DISTWIDTH] IN BLOOD BY AUTOMATED COUNT: 13.9 % (ref 11.6–15.1)
GFR SERPL CREATININE-BSD FRML MDRD: 26 ML/MIN/1.73SQ M
GLUCOSE SERPL-MCNC: 149 MG/DL (ref 65–140)
HCT VFR BLD AUTO: 35.7 % (ref 36.5–49.3)
HGB BLD-MCNC: 10.8 G/DL (ref 12–17)
IMM GRANULOCYTES # BLD AUTO: 0.03 THOUSAND/UL (ref 0–0.2)
IMM GRANULOCYTES NFR BLD AUTO: 0 % (ref 0–2)
LYMPHOCYTES # BLD AUTO: 1.12 THOUSANDS/ÂΜL (ref 0.6–4.47)
LYMPHOCYTES NFR BLD AUTO: 15 % (ref 14–44)
MCH RBC QN AUTO: 29.8 PG (ref 26.8–34.3)
MCHC RBC AUTO-ENTMCNC: 30.3 G/DL (ref 31.4–37.4)
MCV RBC AUTO: 99 FL (ref 82–98)
MONOCYTES # BLD AUTO: 0.67 THOUSAND/ÂΜL (ref 0.17–1.22)
MONOCYTES NFR BLD AUTO: 9 % (ref 4–12)
NEUTROPHILS # BLD AUTO: 5.73 THOUSANDS/ÂΜL (ref 1.85–7.62)
NEUTS SEG NFR BLD AUTO: 75 % (ref 43–75)
NRBC BLD AUTO-RTO: 0 /100 WBCS
PLATELET # BLD AUTO: 196 THOUSANDS/UL (ref 149–390)
PMV BLD AUTO: 9.9 FL (ref 8.9–12.7)
POTASSIUM SERPL-SCNC: 4.3 MMOL/L (ref 3.5–5.3)
PROT SERPL-MCNC: 7.4 G/DL (ref 6.4–8.4)
QRS AXIS: 93 DEGREES
QRS AXIS: 94 DEGREES
QRSD INTERVAL: 166 MS
QRSD INTERVAL: 170 MS
QT INTERVAL: 454 MS
QT INTERVAL: 502 MS
QTC INTERVAL: 492 MS
QTC INTERVAL: 493 MS
RBC # BLD AUTO: 3.62 MILLION/UL (ref 3.88–5.62)
SODIUM SERPL-SCNC: 136 MMOL/L (ref 135–147)
T WAVE AXIS: 261 DEGREES
T WAVE AXIS: 74 DEGREES
VENTRICULAR RATE: 58 BPM
VENTRICULAR RATE: 71 BPM
WBC # BLD AUTO: 7.61 THOUSAND/UL (ref 4.31–10.16)

## 2024-11-18 PROCEDURE — 84484 ASSAY OF TROPONIN QUANT: CPT | Performed by: EMERGENCY MEDICINE

## 2024-11-18 PROCEDURE — 93010 ELECTROCARDIOGRAM REPORT: CPT | Performed by: STUDENT IN AN ORGANIZED HEALTH CARE EDUCATION/TRAINING PROGRAM

## 2024-11-18 PROCEDURE — 85025 COMPLETE CBC W/AUTO DIFF WBC: CPT | Performed by: EMERGENCY MEDICINE

## 2024-11-18 PROCEDURE — 80053 COMPREHEN METABOLIC PANEL: CPT | Performed by: EMERGENCY MEDICINE

## 2024-11-18 PROCEDURE — 99285 EMERGENCY DEPT VISIT HI MDM: CPT

## 2024-11-18 PROCEDURE — 93005 ELECTROCARDIOGRAM TRACING: CPT

## 2024-11-18 PROCEDURE — 83880 ASSAY OF NATRIURETIC PEPTIDE: CPT | Performed by: EMERGENCY MEDICINE

## 2024-11-18 PROCEDURE — 96374 THER/PROPH/DIAG INJ IV PUSH: CPT

## 2024-11-18 PROCEDURE — 99284 EMERGENCY DEPT VISIT MOD MDM: CPT | Performed by: EMERGENCY MEDICINE

## 2024-11-18 PROCEDURE — 71045 X-RAY EXAM CHEST 1 VIEW: CPT

## 2024-11-18 PROCEDURE — 36415 COLL VENOUS BLD VENIPUNCTURE: CPT | Performed by: EMERGENCY MEDICINE

## 2024-11-18 RX ORDER — ASPIRIN 325 MG
325 TABLET ORAL ONCE
Status: COMPLETED | OUTPATIENT
Start: 2024-11-18 | End: 2024-11-18

## 2024-11-18 RX ORDER — FUROSEMIDE 10 MG/ML
80 INJECTION INTRAMUSCULAR; INTRAVENOUS ONCE
Status: COMPLETED | OUTPATIENT
Start: 2024-11-18 | End: 2024-11-18

## 2024-11-18 RX ADMIN — FUROSEMIDE 80 MG: 10 INJECTION, SOLUTION INTRAMUSCULAR; INTRAVENOUS at 16:16

## 2024-11-18 RX ADMIN — ASPIRIN 325 MG ORAL TABLET 325 MG: 325 PILL ORAL at 13:58

## 2024-11-18 NOTE — DISCHARGE INSTRUCTIONS
Please follow up PCP and cardiology. Recommend tylenol 650 mg every 6 hours as needed for pain. Please return for severe chest pain, significant shortness of breath, severely worsening symptoms, or any other concerning signs or symptoms. Please refer to the following documents for additional instructions and return precautions.

## 2024-11-18 NOTE — PROGRESS NOTES
Chart review complete update obtained from [facility] the patient is currently admitted to SNF for STR.  Patient is currently being seen at [facility] Emergency Department.

## 2024-11-19 NOTE — ED PROVIDER NOTES
Time reflects when diagnosis was documented in both MDM as applicable and the Disposition within this note       Time User Action Codes Description Comment    11/18/2024  3:42 PM Willie Collins Add [R09.02] Hypoxia     11/18/2024  7:33 PM Willie Collins Add [R60.0] Bilateral lower extremity edema           ED Disposition       ED Disposition   Discharge    Condition   Stable    Date/Time   Mon Nov 18, 2024  3:42 PM    Comment   Tom Stewart discharge to home/self care.                   Assessment & Plan       Medical Decision Making  88-year-old male history of hypertension, CAD, A-fib, diabetes on Eliquis presenting with reported hypoxia.  Patient satting mid to high 90s on home 2 L without complaints and reportedly at baseline.  Desat likely due to removal of oxygen.  Plan for cardiac evaluation including EKG troponin plus chest x-ray.  Reassess.    EKG interpreted by me with A-fib in the 50s with nonspecific ST abnormality.  Labs interpreted by me notable for troponin of 93.  Slightly elevated from previous.  Normal delta.  BNP 2900 also near baseline.  Chest x-ray with mild pulmonary edema.  Patient remains asymptomatic.  Frequent urination.  Son at bedside reports patient appears at baseline.  Ambulatory pulse ox in the high 90s on home 2 L.  Given dose of IV Lasix.  Heart failure precautions. Discussed results and recommendations. Advised follow up PCP and cardiology. Medication recommendations. Given instructions and return precautions. Patient/family at bedside acknowledged understanding of all written and verbal instructions and return precautions. Discharged.     Amount and/or Complexity of Data Reviewed  Labs: ordered.  Radiology: ordered.    Risk  OTC drugs.  Prescription drug management.             Medications   aspirin tablet 325 mg (325 mg Oral Given 11/18/24 1358)   furosemide (LASIX) injection 80 mg (80 mg Intravenous Given 11/18/24 1616)       ED Risk Strat Scores   HEART Risk Score      Flowsheet  Row Most Recent Value   Heart Score Risk Calculator    History 0 Filed at: 11/18/2024 1500   ECG 1 Filed at: 11/18/2024 1500   Age 2 Filed at: 11/18/2024 1500   Risk Factors 2 Filed at: 11/18/2024 1500   Troponin 2 Filed at: 11/18/2024 1500   HEART Score 7 Filed at: 11/18/2024 1500                       Identification of Seniors at Risk      Flowsheet Row Most Recent Value   (ISAR) Identification of Seniors at Risk    Before the illness or injury that brought you to the Emergency, did you need someone to help you on a regular basis? 1 Filed at: 11/18/2024 1234   In the last 24 hours, have you needed more help than usual? 0 Filed at: 11/18/2024 1234   Have you been hospitalized for one or more nights during the past 6 months? 1 Filed at: 11/18/2024 1234   In general, do you see well? 0 Filed at: 11/18/2024 1234   In general, do you have serious problems with your memory? 1 Filed at: 11/18/2024 1234   Do you take more than three different medications every day? 1 Filed at: 11/18/2024 1234   ISAR Score 4 Filed at: 11/18/2024 1234                SBIRT 20yo+      Flowsheet Row Most Recent Value   Initial Alcohol Screen: US AUDIT-C     1. How often do you have a drink containing alcohol? 0 Filed at: 11/18/2024 1235   2. How many drinks containing alcohol do you have on a typical day you are drinking?  0 Filed at: 11/18/2024 1235   3a. Male UNDER 65: How often do you have five or more drinks on one occasion? 0 Filed at: 11/18/2024 1235   3b. FEMALE Any Age, or MALE 65+: How often do you have 4 or more drinks on one occassion? 0 Filed at: 11/18/2024 1235   Audit-C Score 0 Filed at: 11/18/2024 1235   MAVERICK: How many times in the past year have you...    Used an illegal drug or used a prescription medication for non-medical reasons? Never Filed at: 11/18/2024 1235                            History of Present Illness       Chief Complaint   Patient presents with    Medical Problem     Pt arrives via EMS from [facility] after staff  states at some time this AM he was hypoxic. Per staff he takes his nasal cannula out sometimes and this causes his O2 to drop. Pt was able to complete PT without issue and his O2 was 96% on is baseline oxygen. Pt has no complaints        Past Medical History:   Diagnosis Date    Acute deep vein thrombosis (DVT) of brachial vein of left upper extremity ([facility]) 11/24/2017    Acute deep vein thrombosis (DVT) of left peroneal vein ([facility])     Acute ST elevation myocardial infarction ([facility])     Aortic valve stenosis     Arthritis     Atrial fibrillation ([facility])     Basilar artery stenosis     Basilar artery stenosis     Basilar artery stenosis 05/04/2020    MRA Head wo contrast (12/13/2019)  IMPRESSION:   Multifocal intracranial atherosclerotic disease with moderate to severe stenosis at the origin of the left M1 segment with additional atherosclerotic disease noted in the distal right M1 and proximal inferior left M2 branches.   Moderate to severe stenosis in the proximal and mid basilar artery with nonvisualization of the right intradural vertebral    Benign prostatic hyperplasia with lower urinary tract symptoms     CAD (coronary artery disease)     CAD in native artery 11/24/2017    Underwent cardiac catheterization on 1/30/2020 and had mid and distal LAD stent placed  Cardiac PCI (1/30/2020)  SUMMARY   CORONARY CIRCULATION: Mid LAD: There was a 90 % stenosis at the site of a prior stent. Distal LAD: There was a 90 % stenosis at the site of a prior stent. Left posterior descending artery: There was a diffuse 50 % stenosis.   1ST LESION INTERVENTIONS: A balloon angioplasty wit    Cerebrovascular small vessel disease 11/12/2020    Chronic kidney disease     Closed fracture of multiple ribs of left side with routine healing 09/03/2020    Coronary artery disease     Dementia ([facility])     Diabetes mellitus ([facility])     DM2 (diabetes mellitus, type 2) ([facility])     Elevated troponin 07/19/2021    Herpes zoster without complication  07/28/2021    History of CVA (cerebrovascular accident) 02/21/2019    History of DVT of peroneal vein left lower extremity 12/16/2019    History of transfusion     HLD (hyperlipidemia)     HTN (hypertension)     Infected sebaceous cyst of skin 06/08/2021    Lump in chest 06/01/2021    Middle cerebral artery stenosis     Mild to moderate aortic stenosis 10/09/2018    ECHO (7/2020)  AORTIC VALVE: Leaflets exhibited moderately increased thickness and moderate calcification. Transaortic velocity was increased due to valvular stenosis. There was mild to moderate stenosis. RICHY 1.4cm, mean pressure gradient 11mmHg, peak velocity 2.15m/s There was mild regurgitation.    Myocardial infarction (HCC)     Near syncope 07/19/2021    Other proteinuria 10/08/2019    Proteinuria     Rib fractures (right) 07/31/2020    Stroke (HCC)     Symptomatic bradycardia     Transient cerebral ischemia     Vitamin D deficiency       Past Surgical History:   Procedure Laterality Date    CARDIAC CATHETERIZATION      Outcome: successful; last assessed: 02/03/2015    CARDIAC CATHETERIZATION  11/26/2019    CORONARY ANGIOPLASTY WITH STENT PLACEMENT  2008    stent to LAD     CORONARY ARTERY BYPASS GRAFT      HAND SURGERY      thumb    HIP HARDWARE REMOVAL      JOINT REPLACEMENT      TOTAL HIP ARTHROPLASTY Right     TOTAL HIP ARTHROPLASTY Bilateral       Family History   Problem Relation Age of Onset    Emphysema Father     Emphysema Sister       Social History     Tobacco Use    Smoking status: Never     Passive exposure: Never    Smokeless tobacco: Never   Vaping Use    Vaping status: Never Used   Substance Use Topics    Alcohol use: Never    Drug use: Never      E-Cigarette/Vaping    E-Cigarette Use Never User       E-Cigarette/Vaping Substances    Nicotine No     THC No     CBD No     Flavoring No     Other No     Unknown No       I have reviewed and agree with the history as documented.     88-year-old male history of hypertension, CAD, A-fib,  diabetes on Eliquis presenting with reported hypoxia.  Patient presenting from facility with reported oxygen in the low 80s.  Patient reportedly on 2 L O2 at baseline.  Per EMS, patient did his physical therapy this morning without issue.  Patient has no complaints.  Denies shortness of breath or chest pain.  EMS reported that patient does drop in oxygen into the 80s with removal of his oxygen.  Leg swelling reportedly at baseline.  Denies any other complaints.  Chart reviewed.    Past Medical History:  11/24/2017: Acute deep vein thrombosis (DVT) of brachial vein of left   upper extremity ([facility])  No date: Acute deep vein thrombosis (DVT) of left peroneal vein ([facility])  No date: Acute ST elevation myocardial infarction ([facility])  No date: Aortic valve stenosis  No date: Arthritis  No date: Atrial fibrillation ([facility])  No date: Basilar artery stenosis  No date: Basilar artery stenosis  05/04/2020: Basilar artery stenosis      Comment:  MRA Head wo contrast (12/13/2019)  IMPRESSION:                  Multifocal intracranial atherosclerotic disease with                moderate to severe stenosis at the origin of the left M1                segment with additional atherosclerotic disease noted in                the distal right M1 and proximal inferior left M2                branches.   Moderate to severe stenosis in the proximal                and mid basilar artery with nonvisualization of the right               intradural vertebral  No date: Benign prostatic hyperplasia with lower urinary tract   symptoms  No date: CAD (coronary artery disease)  11/24/2017: CAD in native artery      Comment:  Underwent cardiac catheterization on 1/30/2020 and had                mid and distal LAD stent placed  Cardiac PCI (1/30/2020)                SUMMARY   CORONARY CIRCULATION: Mid LAD: There was a 90 %               stenosis at the site of a prior stent. Distal LAD: There                was a 90 % stenosis at the site of a prior stent. Left                 posterior descending artery: There was a diffuse 50 %                stenosis.   1ST LESION INTERVENTIONS: A balloon                angioplasty wit  11/12/2020: Cerebrovascular small vessel disease  No date: Chronic kidney disease  09/03/2020: Closed fracture of multiple ribs of left side with   routine healing  No date: Coronary artery disease  No date: Dementia ([facility])  No date: Diabetes mellitus ([facility])  No date: DM2 (diabetes mellitus, type 2) ([facility])  07/19/2021: Elevated troponin  07/28/2021: Herpes zoster without complication  02/21/2019: History of CVA (cerebrovascular accident)  12/16/2019: History of DVT of peroneal vein left lower extremity  No date: History of transfusion  No date: HLD (hyperlipidemia)  No date: HTN (hypertension)  06/08/2021: Infected sebaceous cyst of skin  06/01/2021: Lump in chest  No date: Middle cerebral artery stenosis  10/09/2018: Mild to moderate aortic stenosis      Comment:  ECHO (7/2020)  AORTIC VALVE: Leaflets exhibited                moderately increased thickness and moderate                calcification. Transaortic velocity was increased due to                valvular stenosis. There was mild to moderate stenosis.                RICHY 1.4cm, mean pressure gradient 11mmHg, peak velocity                2.15m/s There was mild regurgitation.  No date: Myocardial infarction ([facility])  07/19/2021: Near syncope  10/08/2019: Other proteinuria  No date: Proteinuria  07/31/2020: Rib fractures (right)  No date: Stroke ([facility])  No date: Symptomatic bradycardia  No date: Transient cerebral ischemia  No date: Vitamin D deficiency  Family History: non-contributory  Social History          Review of Systems   Constitutional:  Negative for appetite change, chills, diaphoresis, fever and unexpected weight change.   HENT:  Negative for congestion and rhinorrhea.    Eyes:  Negative for photophobia and visual disturbance.   Respiratory:  Negative for cough, chest tightness and shortness of  breath.    Cardiovascular:  Negative for chest pain, palpitations and leg swelling.   Gastrointestinal:  Negative for abdominal distention, abdominal pain, blood in stool, constipation, diarrhea, nausea and vomiting.   Genitourinary:  Negative for dysuria and hematuria.   Musculoskeletal:  Negative for back pain, joint swelling, neck pain and neck stiffness.   Skin:  Negative for color change, pallor, rash and wound.   Neurological:  Negative for dizziness, syncope, weakness, light-headedness and headaches.   Psychiatric/Behavioral:  Negative for agitation.    All other systems reviewed and are negative.          Objective       ED Triage Vitals   Temperature Pulse Blood Pressure Respirations SpO2 Patient Position - Orthostatic VS   11/18/24 1243 11/18/24 1243 11/18/24 1243 11/18/24 1243 11/18/24 1243 11/18/24 1400   (!) 96.4 °F (35.8 °C) 65 140/86 18 97 % Lying      Temp Source Heart Rate Source BP Location FiO2 (%) Pain Score    11/18/24 1243 11/18/24 1400 11/18/24 1400 -- --    Axillary Monitor Left arm        Vitals      Date and Time Temp Pulse SpO2 Resp BP Pain Score FACES Pain Rating User   11/18/24 1701 -- 76 97 % 26 137/86 -- -- KT   11/18/24 1600 -- 68 95 % 21 157/102 -- -- KT   11/18/24 1433 -- 63 96 % 20 154/81 -- -- KT   11/18/24 1400 -- 76 97 % 18 119/77 -- -- JK   11/18/24 1243 96.4 °F (35.8 °C) 65 97 % 18 140/86 -- -- JK            Physical Exam  Vitals and nursing note reviewed.   Constitutional:       General: He is not in acute distress.     Appearance: Normal appearance. He is well-developed. He is not ill-appearing, toxic-appearing or diaphoretic.   HENT:      Head: Normocephalic and atraumatic.      Nose: Nose normal. No congestion or rhinorrhea.      Mouth/Throat:      Mouth: Mucous membranes are moist.      Pharynx: Oropharynx is clear. No oropharyngeal exudate or posterior oropharyngeal erythema.   Eyes:      General: No scleral icterus.        Right eye: No discharge.         Left eye: No  discharge.      Extraocular Movements: Extraocular movements intact.      Conjunctiva/sclera: Conjunctivae normal.      Pupils: Pupils are equal, round, and reactive to light.   Neck:      Vascular: No JVD.      Trachea: No tracheal deviation.      Comments: Supple. Normal range of motion.   Cardiovascular:      Rate and Rhythm: Normal rate and regular rhythm.      Heart sounds: Normal heart sounds. No murmur heard.     No friction rub. No gallop.      Comments: Normal rate and regular rhythm  Pulmonary:      Effort: Pulmonary effort is normal. No respiratory distress.      Breath sounds: No stridor. Rales present. No wheezing.      Comments: Mild bibasilar Rales  Chest:      Chest wall: No tenderness.   Abdominal:      General: Bowel sounds are normal. There is no distension.      Palpations: Abdomen is soft.      Tenderness: There is no abdominal tenderness. There is no right CVA tenderness, left CVA tenderness, guarding or rebound.      Comments: Soft, nontender, nondistended.  Normal bowel sounds throughout   Musculoskeletal:         General: Swelling present. No tenderness, deformity or signs of injury. Normal range of motion.      Cervical back: Normal range of motion and neck supple. No rigidity. No muscular tenderness.      Right lower leg: No edema.      Left lower leg: No edema.      Comments: 1+ bilateral lower extremity pitting edema up through mid shins   Lymphadenopathy:      Cervical: No cervical adenopathy.   Skin:     General: Skin is warm and dry.      Coloration: Skin is not pale.      Findings: No erythema or rash.   Neurological:      General: No focal deficit present.      Mental Status: He is alert. Mental status is at baseline.      Sensory: No sensory deficit.      Motor: No weakness or abnormal muscle tone.      Coordination: Coordination normal.      Gait: Gait normal.      Comments: Alert.  Strength and sensation grossly intact.  Ambulatory without difficulty at baseline.    Psychiatric:          Behavior: Behavior normal.         Thought Content: Thought content normal.         Results Reviewed       Procedure Component Value Units Date/Time    HS Troponin I 2hr [[id number]]  (Abnormal) Collected: 11/18/24 1457    Lab Status: Final result Specimen: Blood from Arm, Right Updated: 11/18/24 1535     hs TnI 2hr 76 ng/L      Delta 2hr hsTnI -17 ng/L     B-Type Natriuretic Peptide(BNP) [[id number]]  (Abnormal) Collected: 11/18/24 1257    Lab Status: Final result Specimen: Blood from Arm, Right Updated: 11/18/24 1353     BNP 2,979 pg/mL     HS Troponin 0hr (reflex protocol) [[id number]]  (Abnormal) Collected: 11/18/24 1257    Lab Status: Final result Specimen: Blood from Arm, Right Updated: 11/18/24 1326     hs TnI 0hr 93 ng/L     Comprehensive metabolic panel [[id number]]  (Abnormal) Collected: 11/18/24 1257    Lab Status: Final result Specimen: Blood from Arm, Right Updated: 11/18/24 1318     Sodium 136 mmol/L      Potassium 4.3 mmol/L      Chloride 94 mmol/L      CO2 34 mmol/L      ANION GAP 8 mmol/L      BUN 47 mg/dL      Creatinine 2.16 mg/dL      Glucose 149 mg/dL      Calcium 9.8 mg/dL      AST 12 U/L      ALT 10 U/L      Alkaline Phosphatase 43 U/L      Total Protein 7.4 g/dL      Albumin 3.8 g/dL      Total Bilirubin 0.60 mg/dL      eGFR 26 ml/min/1.73sq m     Narrative:      National Kid[employer] Disease Foundation guidelines for Chronic Kidney Disease (CKD):     Stage 1 with normal or high GFR (GFR > 90 mL/min/1.73 square meters)    Stage 2 Mild CKD (GFR = 60-89 mL/min/1.73 square meters)    Stage 3A Moderate CKD (GFR = 45-59 mL/min/1.73 square meters)    Stage 3B Moderate CKD (GFR = 30-44 mL/min/1.73 square meters)    Stage 4 Severe CKD (GFR = 15-29 mL/min/1.73 square meters)    Stage 5 End Stage CKD (GFR <15 mL/min/1.73 square meters)  Note: GFR calculation is accurate only with a steady state creatinine    CBC and differential [[id number]]  (Abnormal) Collected: 11/18/24 1257    Lab Status: Final result  Specimen: Blood from Arm, Right Updated: 11/18/24 1310     WBC 7.61 Thousand/uL      RBC 3.62 Million/uL      Hemoglobin 10.8 g/dL      Hematocrit 35.7 %      MCV 99 fL      MCH 29.8 pg      MCHC 30.3 g/dL      RDW 13.9 %      MPV 9.9 fL      Platelets 196 Thousands/uL      nRBC 0 /100 WBCs      Segmented % 75 %      Immature Grans % 0 %      Lymphocytes % 15 %      Monocytes % 9 %      Eosinophils Relative 1 %      Basophils Relative 0 %      Absolute Neutrophils 5.73 Thousands/µL      Absolute Immature Grans 0.03 Thousand/uL      Absolute Lymphocytes 1.12 Thousands/µL      Absolute Monocytes 0.67 Thousand/µL      Eosinophils Absolute 0.04 Thousand/µL      Basophils Absolute 0.02 Thousands/µL             XR chest 1 view portable   Final Interpretation by Dom Harrington MD (11/18 1528)      Mild cardiomegaly with mild pulmonary vascular congestion and probable interstitial edema with small bilateral pleural effusions.            Workstation performed: [id number]             Procedures    ED Medication and Procedure Management   Prior to Admission Medications   Prescriptions Last Dose Informant Patient Reported? Taking?   Blood Glucose Monitoring Suppl (ONE TOUCH ULTRA MINI) w/Device KIT  Child No No   Sig: Use daily   Omega-3 Fatty Acids (FISH OIL CONCENTRATE PO)  Child Yes No   Sig: Take 1 tablet by mouth daily   acetaminophen (TYLENOL) 500 mg tablet  Child Yes No   Sig: Take 500 mg by mouth every 6 (six) hours as needed for mild pain   amLODIPine (NORVASC) 10 mg tablet  Child No No   Sig: TAKE 1 TABLET BY MOUTH DAILY   apixaban (ELIQUIS) 2.5 mg  Child No No   Sig: Take 1 tablet (2.5 mg total) by mouth 2 (two) times a day   aspirin (ECOTRIN LOW STRENGTH) 81 mg EC tablet  Child No No   Sig: Take 1 tablet (81 mg total) by mouth daily   cholecalciferol (VITAMIN D3) 1,000 units tablet  Child Yes No   Sig: Take 1,000 Units by mouth daily   finasteride (PROSCAR) 5 mg tablet  Child No No   Sig: TAKE 1 TABLET BY MOUTH  DAILY   furosemide (LASIX) 20 mg tablet   No No   Sig: Take 4 tablets (80 mg total) by mouth 2 (two) times a day   gabapentin (NEURONTIN) 100 mg capsule  Child No No   Sig: TAKE 1 CAPSULE BY MOUTH TWICE  DAILY   glipiZIDE (GLUCOTROL XL) 2.5 mg 24 hr tablet  Child No No   Sig: TAKE 1 TABLET BY MOUTH DAILY   glucose blood (OneTouch Ultra) test strip  Child No No   Sig: Check blood sugars once daily. Please substitute with appropriate alternative as covered by patient's insurance. Dx: E11.65   isosorbide dinitrate (ISORDIL) 10 mg tablet  Child No No   Sig: TAKE 1 TABLET BY MOUTH TWICE  DAILY   lisinopril (ZESTRIL) 20 mg tablet  Child No No   Sig: TAKE 1 TABLET BY MOUTH DAILY   metoprolol tartrate (LOPRESSOR) 25 mg tablet  Child No No   Sig: Take 1 tablet (25 mg total) by mouth in the morning   Patient taking differently: Take 12.5 mg by mouth in the morning   vitamin B-12 (CYANOCOBALAMIN) 100 MCG tablet  Child Yes No   Sig: Take 1,000 mcg by mouth daily      Facility-Administered Medications: None     Discharge Medication List as of 11/18/2024  5:17 PM        CONTINUE these medications which have NOT CHANGED    Details   acetaminophen (TYLENOL) 500 mg tablet Take 500 mg by mouth every 6 (six) hours as needed for mild pain, Historical Med      amLODIPine (NORVASC) 10 mg tablet TAKE 1 TABLET BY MOUTH DAILY, Starting Thu 9/26/2024, Normal      apixaban (ELIQUIS) 2.5 mg Take 1 tablet (2.5 mg total) by mouth 2 (two) times a day, Starting Wed 1/10/2024, Print      aspirin (ECOTRIN LOW STRENGTH) 81 mg EC tablet Take 1 tablet (81 mg total) by mouth daily, Starting Tue 11/30/2021, No Print      Blood Glucose Monitoring Suppl (ONE TOUCH ULTRA MINI) w/Device KIT Use daily, Starting Sun 11/12/2023, Normal      cholecalciferol (VITAMIN D3) 1,000 units tablet Take 1,000 Units by mouth daily, Historical Med      finasteride (PROSCAR) 5 mg tablet TAKE 1 TABLET BY MOUTH DAILY, Starting Tue 7/16/2024, Normal      furosemide (LASIX) 20  mg tablet Take 4 tablets (80 mg total) by mouth 2 (two) times a day, Starting Mon 11/11/2024, Until Wed 12/11/2024, No Print      gabapentin (NEURONTIN) 100 mg capsule TAKE 1 CAPSULE BY MOUTH TWICE  DAILY, Normal      glipiZIDE (GLUCOTROL XL) 2.5 mg 24 hr tablet TAKE 1 TABLET BY MOUTH DAILY, Normal      glucose blood (OneTouch Ultra) test strip Check blood sugars once daily. Please substitute with appropriate alternative as covered by patient's insurance. Dx: E11.65, Normal      isosorbide dinitrate (ISORDIL) 10 mg tablet TAKE 1 TABLET BY MOUTH TWICE  DAILY, Normal      lisinopril (ZESTRIL) 20 mg tablet TAKE 1 TABLET BY MOUTH DAILY, Normal      metoprolol tartrate (LOPRESSOR) 25 mg tablet Take 1 tablet (25 mg total) by mouth in the morning, Starting Thu 10/31/2024, Until Sat 11/30/2024, No Print      Omega-3 Fatty Acids (FISH OIL CONCENTRATE PO) Take 1 tablet by mouth daily, Historical Med      vitamin B-12 (CYANOCOBALAMIN) 100 MCG tablet Take 1,000 mcg by mouth daily, Historical Med           No discharge procedures on file.  ED SEPSIS DOCUMENTATION   Time reflects when diagnosis was documented in both MDM as applicable and the Disposition within this note       Time User Action Codes Description Comment    11/18/2024  3:42 PM Willie Collins [R09.02] Hypoxia     11/18/2024  7:33 PM Willie Collins [R60.0] Bilateral lower extremity edema                  Willie Collins MD  11/18/24 1933

## 2024-11-26 ENCOUNTER — PATIENT OUTREACH (OUTPATIENT)
Dept: CASE MANAGEMENT | Facility: OTHER | Age: 89
End: 2024-11-26

## 2024-12-01 NOTE — PROGRESS NOTES
[facility] Cardiology   Office Visit    Tom Stewart 88 y.o. male MRN: [id number]    12/02/24      Assessment & Plan  Chronic HFpEF  Wt Readings from Last 3 Encounters:   12/02/24 96.6 kg (213 lb)   10/30/24 91.6 kg (201 lb 15.1 oz)   10/22/24 95.6 kg (210 lb 12.8 oz)   Euvolemic on exam.  Continue Lasix 80 mg bid.  Encourage low-sodium diet, daily weights, LE compression stockings, and LE elevation.  Advised to notify our office for weight gain or any new/worsening symptoms.  Coronary artery disease involving native coronary artery of native heart without angina pectoris  Continue ASA, Lopressor, and Isordil.  Not on statin due to history of allergy (hives).  Paroxysmal atrial fibrillation (HCC)  HR is well-controlled, continue Lopressor.  [id number] at least 7, continue Eliquis 2.5 mg bid (age, creatinine).  Moderate aortic stenosis  Patient/family previously opted for conservative management given advanced age in setting of multiple comorbidities and dementia.  Primary hypertension  BP is well-controlled.  Encourage ambulatory monitoring and low-sodium diet.  Continue amlodipine, lisinopril, Lopressor, Lasix, and Isordil.  Mixed hyperlipidemia  Not on statin due to history of allergy (hives).  Continue dietary control and periodic surveillance.  History of CVA  On ASA and Eliquis.        Interval history: Tom Stewart is a 88 y.o. year old male with history of chronic HFrEF, CAD, paroxysmal atrial fibrillation on low-dose Eliquis, moderate aortic stenosis, hypertension, hyperlipidemia, CVA, T2DM, CKD, and dementia who presents for office visit with daughter and a caregiver from [facility].  Patient is a poor historian given dementia, so history was obtained via daughter, caregiver, and chart review.  Since time of previous visit with cardiology, patient was evaluated in the ED for hypoxia/SOB and noted to have elevated BNP and CXR suggestive of CHF.  Patient received dose of IV Lasix and was  "discharged home.  He has reportedly been well overall from a cardiac standpoint since time of discharge.  Endorses strict compliance to all medications and a low-sodium diet.  Unfortunately, patient has been experiencing progressively worsening dementia/confusion.  He also experiences frequent urgent sensations for bowel movements without passing stool.  Patient ambulates with the assistance of a wheelchair.  Denies any additional complaints at this time.  He has not followed up with nephrology in over a year, however daughter plans to schedule an appointment for such.  Follows with Dr. Sweeney as primary cardiologist.      Review of Systems:  Review of Systems   Unable to perform ROS: Dementia       PHYSICAL EXAM:  Vitals:   Vitals:    12/02/24 1100   BP: 120/70   BP Location: Left arm   Patient Position: Sitting   Cuff Size: Standard   Pulse: 97   Resp: 16   SpO2: 97%   Weight: 96.6 kg (213 lb)   Height: 5' 10\" (1.778 m)        Physical Exam:  GEN: Alert and in no acute distress.  Well appearing and well nourished.  NC in place.  HEENT: Sclera anicteric, conjunctivae pink, mucous membranes moist. Oropharynx clear.   NECK: Supple, no carotid bruits, no significant JVD. Trachea midline, no thyromegaly.   HEART: Regular rhythm, normal S1 and S2, 2/6 LINDSAY, no clicks, gallops or rubs. PMI nondisplaced, no thrills.   LUNGS: Clear to auscultation bilaterally; no wheezes, rales, or rhonchi. No increased work of breathing or signs of respiratory distress.   ABDOMEN: Soft, nontender, nondistended, normoactive bowel sounds.   EXTREMITIES: Skin warm and well perfused, no clubbing or cyanosis, no edema.  NEURO: No focal findings. Normal speech. Mood and affect normal.   SKIN: Normal without suspicious lesions on exposed skin.    Follow up: 3 months or sooner as needed    Allergies   Allergen Reactions    Celecoxib Other (See Comments) and Hives     Per pt does NOT remember type of reaction or severity level    Hydromorphone " Other (See Comments) and Hives     Per pt does NOT remember type of reaction or severity level    Pravastatin Hives    Statins Other (See Comments) and Hives     Per pt does NOT remember type of reaction or severity level         Current Outpatient Medications:     acetaminophen (TYLENOL) 500 mg tablet, Take 500 mg by mouth every 6 (six) hours as needed for mild pain, Disp: , Rfl:     ALPRAZolam (NIRAVAM) 0.25 MG dissolvable tablet, Take 0.25 mg by mouth daily at bedtime as needed for anxiety, Disp: , Rfl:     amLODIPine (NORVASC) 10 mg tablet, TAKE 1 TABLET BY MOUTH DAILY, Disp: 90 tablet, Rfl: 3    apixaban (ELIQUIS) 2.5 mg, Take 1 tablet (2.5 mg total) by mouth 2 (two) times a day, Disp: 180 tablet, Rfl: 3    aspirin (ECOTRIN LOW STRENGTH) 81 mg EC tablet, Take 1 tablet (81 mg total) by mouth daily, Disp: , Rfl:     Blood Glucose Monitoring Suppl (ONE TOUCH ULTRA MINI) w/Device KIT, Use daily, Disp: 1 kit, Rfl: 0    cholecalciferol (VITAMIN D3) 1,000 units tablet, Take 1,000 Units by mouth daily, Disp: , Rfl:     finasteride (PROSCAR) 5 mg tablet, TAKE 1 TABLET BY MOUTH DAILY, Disp: 90 tablet, Rfl: 3    furosemide (LASIX) 20 mg tablet, Take 4 tablets (80 mg total) by mouth 2 (two) times a day, Disp: , Rfl:     gabapentin (NEURONTIN) 100 mg capsule, TAKE 1 CAPSULE BY MOUTH TWICE  DAILY, Disp: 180 capsule, Rfl: 3    glipiZIDE (GLUCOTROL XL) 2.5 mg 24 hr tablet, TAKE 1 TABLET BY MOUTH DAILY, Disp: 90 tablet, Rfl: 3    glucose blood (OneTouch Ultra) test strip, Check blood sugars once daily. Please substitute with appropriate alternative as covered by patient's insurance. Dx: E11.65, Disp: 100 each, Rfl: 3    isosorbide dinitrate (ISORDIL) 10 mg tablet, TAKE 1 TABLET BY MOUTH TWICE  DAILY, Disp: 180 tablet, Rfl: 3    lisinopril (ZESTRIL) 20 mg tablet, TAKE 1 TABLET BY MOUTH DAILY, Disp: 90 tablet, Rfl: 3    metoprolol tartrate (LOPRESSOR) 25 mg tablet, Take 1 tablet (25 mg total) by mouth in the morning, Disp: 30  tablet, Rfl: 0    Omega-3 Fatty Acids (FISH OIL CONCENTRATE PO), Take 1 tablet by mouth daily, Disp: , Rfl:     vitamin B-12 (CYANOCOBALAMIN) 100 MCG tablet, Take 1,000 mcg by mouth daily, Disp: , Rfl:     Past Medical History:   Diagnosis Date    Acute deep vein thrombosis (DVT) of brachial vein of left upper extremity (HCC) 11/24/2017    Acute deep vein thrombosis (DVT) of left peroneal vein (HCC)     Acute ST elevation myocardial infarction (HCC)     Aortic valve stenosis     Arthritis     Atrial fibrillation (HCC)     Basilar artery stenosis     Basilar artery stenosis     Basilar artery stenosis 05/04/2020    MRA Head wo contrast (12/13/2019)  IMPRESSION:   Multifocal intracranial atherosclerotic disease with moderate to severe stenosis at the origin of the left M1 segment with additional atherosclerotic disease noted in the distal right M1 and proximal inferior left M2 branches.   Moderate to severe stenosis in the proximal and mid basilar artery with nonvisualization of the right intradural vertebral    Benign prostatic hyperplasia with lower urinary tract symptoms     CAD (coronary artery disease)     CAD in native artery 11/24/2017    Underwent cardiac catheterization on 1/30/2020 and had mid and distal LAD stent placed  Cardiac PCI (1/30/2020)  SUMMARY   CORONARY CIRCULATION: Mid LAD: There was a 90 % stenosis at the site of a prior stent. Distal LAD: There was a 90 % stenosis at the site of a prior stent. Left posterior descending artery: There was a diffuse 50 % stenosis.   1ST LESION INTERVENTIONS: A balloon angioplasty wit    Cerebrovascular small vessel disease 11/12/2020    Chronic kidney disease     Closed fracture of multiple ribs of left side with routine healing 09/03/2020    Coronary artery disease     Dementia (Formerly [facility])     Diabetes mellitus ([facility])     DM2 (diabetes mellitus, type 2) (Formerly [facility])     Elevated troponin 07/19/2021    Herpes zoster without complication 07/28/2021    History of CVA  (cerebrovascular accident) 02/21/2019    History of DVT of peroneal vein left lower extremity 12/16/2019    History of transfusion     HLD (hyperlipidemia)     HTN (hypertension)     Infected sebaceous cyst of skin 06/08/2021    Lump in chest 06/01/2021    Middle cerebral artery stenosis     Mild to moderate aortic stenosis 10/09/2018    ECHO (7/2020)  AORTIC VALVE: Leaflets exhibited moderately increased thickness and moderate calcification. Transaortic velocity was increased due to valvular stenosis. There was mild to moderate stenosis. RICHY 1.4cm, mean pressure gradient 11mmHg, peak velocity 2.15m/s There was mild regurgitation.    Myocardial infarction (HCC)     Near syncope 07/19/2021    Other proteinuria 10/08/2019    Proteinuria     Rib fractures (right) 07/31/2020    Stroke (Formerly [facility])     Symptomatic bradycardia     Transient cerebral ischemia     Vitamin D deficiency        Family History   Problem Relation Age of Onset    Emphysema Father     Emphysema Sister        Past Medical History:   Diagnosis Date    Acute deep vein thrombosis (DVT) of brachial vein of left upper extremity (HCC) 11/24/2017    Acute deep vein thrombosis (DVT) of left peroneal vein (Formerly [facility])     Acute ST elevation myocardial infarction (HCC)     Aortic valve stenosis     Arthritis     Atrial fibrillation (HCC)     Basilar artery stenosis     Basilar artery stenosis     Basilar artery stenosis 05/04/2020    MRA Head wo contrast (12/13/2019)  IMPRESSION:   Multifocal intracranial atherosclerotic disease with moderate to severe stenosis at the origin of the left M1 segment with additional atherosclerotic disease noted in the distal right M1 and proximal inferior left M2 branches.   Moderate to severe stenosis in the proximal and mid basilar artery with nonvisualization of the right intradural vertebral    Benign prostatic hyperplasia with lower urinary tract symptoms     CAD (coronary artery disease)     CAD in native artery 11/24/2017     Underwent cardiac catheterization on 1/30/2020 and had mid and distal LAD stent placed  Cardiac PCI (1/30/2020)  SUMMARY   CORONARY CIRCULATION: Mid LAD: There was a 90 % stenosis at the site of a prior stent. Distal LAD: There was a 90 % stenosis at the site of a prior stent. Left posterior descending artery: There was a diffuse 50 % stenosis.   1ST LESION INTERVENTIONS: A balloon angioplasty wit    Cerebrovascular small vessel disease 11/12/2020    Chronic kidney disease     Closed fracture of multiple ribs of left side with routine healing 09/03/2020    Coronary artery disease     Dementia (Formerly [facility])     Diabetes mellitus ([facility]ly [facility])     DM2 (diabetes mellitus, type 2) ([facility])     Elevated troponin 07/19/2021    Herpes zoster without complication 07/28/2021    History of CVA (cerebrovascular accident) 02/21/2019    History of DVT of peroneal vein left lower extremity 12/16/2019    History of transfusion     HLD (hyperlipidemia)     HTN (hypertension)     Infected sebaceous cyst of skin 06/08/2021    Lump in chest 06/01/2021    Middle cerebral artery stenosis     Mild to moderate aortic stenosis 10/09/2018    ECHO (7/2020)  AORTIC VALVE: Leaflets exhibited moderately increased thickness and moderate calcification. Transaortic velocity was increased due to valvular stenosis. There was mild to moderate stenosis. RICHY 1.4cm, mean pressure gradient 11mmHg, peak velocity 2.15m/s There was mild regurgitation.    Myocardial infarction (Formerly [facility])     Near syncope 07/19/2021    Other proteinuria 10/08/2019    Proteinuria     Rib fractures (right) 07/31/2020    Stroke ([facility])     Symptomatic bradycardia     Transient cerebral ischemia     Vitamin D deficiency        Past Surgical History:   Procedure Laterality Date    CARDIAC CATHETERIZATION      Outcome: successful; last assessed: 02/03/2015    CARDIAC CATHETERIZATION  11/26/2019    CORONARY ANGIOPLASTY WITH STENT PLACEMENT  2008    stent to LAD     CORONARY ARTERY BYPASS GRAFT      HAND  SURGERY      thumb    HIP HARDWARE REMOVAL      JOINT REPLACEMENT      TOTAL HIP ARTHROPLASTY Right     TOTAL HIP ARTHROPLASTY Bilateral        Social History     Socioeconomic History    Marital status:      Spouse name: Not on file    Number of children: Not on file    Years of education: Not on file    Highest education level: Not on file   Occupational History    Not on file   Tobacco Use    Smoking status: Never     Passive exposure: Never    Smokeless tobacco: Never   Vaping Use    Vaping status: Never Used   Substance and Sexual Activity    Alcohol use: Never    Drug use: Never    Sexual activity: Not Currently     Partners: Female     Birth control/protection: None   Other Topics Concern    Not on file   Social History Narrative    Active advance directive (as per [employer])     No advance directive on file (as per [employer])      Social Drivers of Health     Financial Resource Strain: Low Risk  (1/23/2024)    Overall Financial Resource Strain (CARDIA)     Difficulty of Paying Living Expenses: Not very hard   Food Insecurity: No Food Insecurity (10/25/2024)    Nursing - Inadequate Food Risk Classification     Worried About Running Out of Food in the Last Year: Not on file     Ran Out of Food in the Last Year: Not on file     Ran Out of Food in the Last Year: 1   Transportation Needs: No Transportation Needs (10/25/2024)    Nursing - Transportation Risk Classification     Lack of Transportation: Not on file     Lack of Transportation: 2   Physical Activity: Inactive (5/11/2021)    Exercise Vital Sign     Days of Exercise per Week: 0 days     Minutes of Exercise per Session: 0 min   Stress: No Stress Concern Present (9/22/2023)    [employer] - Occupational Stress Questionnaire     Feeling of Stress : Not at all   Social Connections: Unknown (6/18/2024)    Received from [employer]    Social [employer]     How often do you feel lonely or isolated from those around you?  (Adult - for ages 18 years and over): Not on file   Intimate Partner Violence: Unknown (10/25/2024)    Nursing IPS     Feels Physically and Emotionally Safe: Not on file     Physically Hurt by Someone: Not on file     Humiliated or Emotionally Abused by Someone: Not on file     Physically Hurt by Someone: 2     Hurt or Threatened by Someone: 2   Housing Stability: Unknown (10/25/2024)    Nursing: Inadequate Housing Risk Classification     Has Housing: Not on file     Worried About Losing Housing: Not on file     Unable to Get Utilities: Not on file     Unable to Pay for Housing in the Last Year: 2     Has Housin         LABORATORY RESULTS:    Lab Results   Component Value Date    WBC 7.61 2024    HGB 10.8 (L) 2024    HCT 35.7 (L) 2024    MCV 99 (H) 2024     2024     Lab Results   Component Value Date    GLUCOSE 171 (H) 2017    CALCIUM 9.8 2024     2015    K 4.3 2024    CO2 34 (H) 2024    CL 94 (L) 2024    BUN 47 (H) 2024    CREATININE 2.16 (H) 2024     Lab Results   Component Value Date    HGBA1C 7.1 (H) 2024       Lipid Profile:   Lab Results   Component Value Date    CHOL 190 2015    CHOL 210 2015    CHOL 198 2014     Lab Results   Component Value Date    HDL 46 2024    HDL 44 2024    HDL 57 01/10/2023     Lab Results   Component Value Date    LDLCALC 125 (H) 2024    LDLCALC 131 (H) 2024    LDLCALC 139 (H) 01/10/2023     Lab Results   Component Value Date    TRIG 138 2024    TRIG 135 2024    TRIG 94 01/10/2023       The ASCVD Risk score (David DK, et al., 2019) failed to calculate for the following reasons:    The 2019 ASCVD risk score is only valid for ages 40 to 79    Risk score cannot be calculated because patient has a medical history suggesting prior/existing ASCVD    1. Chronic HFpEF        2. Coronary artery disease involving native coronary artery of  native heart without angina pectoris        3. Paroxysmal atrial fibrillation (HCC)        4. Moderate aortic stenosis        5. Primary hypertension        6. Mixed hyperlipidemia        7. History of CVA            Imaging: I have reviewed all pertinent imaging studies.    Recommend aggressive risk factor modification and therapeutic lifestyle changes.  Low-salt, low-calorie, low-fat, low-cholesterol diet with regular exercise and to optimize weight.    Discussed concepts of atherosclerosis, including signs and symptoms of cardiac disease.    Medications reviewed and possible side effects discussed.  Previous studies were reviewed.    Safety measures were reviewed.  All questions and concerns addressed.  Patient was advised to report any problems requiring medical attention.    Follow-up with PCP and appropriate specialist and lab work as discussed.    Return for follow up visit as scheduled or earlier, if needed.  Thank you for allowing me to participate in the care and evaluation of your patient.  Should you have any questions, please feel free to contact me.    Ignacio Richards PA-C  12/2/2024,5:20 PM

## 2024-12-01 NOTE — ASSESSMENT & PLAN NOTE
Wt Readings from Last 3 Encounters:   12/02/24 96.6 kg (213 lb)   10/30/24 91.6 kg (201 lb 15.1 oz)   10/22/24 95.6 kg (210 lb 12.8 oz)   Euvolemic on exam.  Continue Lasix 80 mg bid.  Encourage low-sodium diet, daily weights, LE compression stockings, and LE elevation.  Advised to notify our office for weight gain or any new/worsening symptoms.

## 2024-12-02 ENCOUNTER — PATIENT OUTREACH (OUTPATIENT)
Dept: CASE MANAGEMENT | Facility: OTHER | Age: 89
End: 2024-12-02

## 2024-12-02 ENCOUNTER — OFFICE VISIT (OUTPATIENT)
Dept: CARDIOLOGY CLINIC | Facility: CLINIC | Age: 89
End: 2024-12-02
Payer: MEDICARE

## 2024-12-02 VITALS
WEIGHT: 213 LBS | BODY MASS INDEX: 30.49 KG/M2 | HEART RATE: 97 BPM | DIASTOLIC BLOOD PRESSURE: 70 MMHG | OXYGEN SATURATION: 97 % | HEIGHT: 70 IN | SYSTOLIC BLOOD PRESSURE: 120 MMHG | RESPIRATION RATE: 16 BRPM

## 2024-12-02 DIAGNOSIS — I25.10 CORONARY ARTERY DISEASE INVOLVING NATIVE CORONARY ARTERY OF NATIVE HEART WITHOUT ANGINA PECTORIS: Chronic | ICD-10-CM

## 2024-12-02 DIAGNOSIS — I10 PRIMARY HYPERTENSION: Chronic | ICD-10-CM

## 2024-12-02 DIAGNOSIS — Z86.73 HISTORY OF CVA (CEREBROVASCULAR ACCIDENT): Chronic | ICD-10-CM

## 2024-12-02 DIAGNOSIS — I48.0 PAROXYSMAL ATRIAL FIBRILLATION (HCC): Chronic | ICD-10-CM

## 2024-12-02 DIAGNOSIS — I35.0 NONRHEUMATIC AORTIC VALVE STENOSIS: ICD-10-CM

## 2024-12-02 DIAGNOSIS — E78.2 MIXED HYPERLIPIDEMIA: ICD-10-CM

## 2024-12-02 DIAGNOSIS — I50.32 CHRONIC HEART FAILURE WITH PRESERVED EJECTION FRACTION (HCC): Primary | ICD-10-CM

## 2024-12-02 PROCEDURE — 99214 OFFICE O/P EST MOD 30 MIN: CPT

## 2024-12-02 RX ORDER — ALPRAZOLAM 0.25 MG/1
0.25 TABLET, ORALLY DISINTEGRATING ORAL
COMMUNITY

## 2024-12-02 NOTE — PROGRESS NOTES
Chart review complete update obtained from [facility] the patient is currently admitted to SNF for STR. It has been 30 days since SNF/STR Surveillance episode was opened I will no longer be following the patient per protocol. I have removed myself from the care team, updated the Care Coordination note, and closed the episode.

## 2024-12-02 NOTE — ASSESSMENT & PLAN NOTE
Not on statin due to history of allergy (hives).  Continue dietary control and periodic surveillance.

## 2024-12-02 NOTE — ASSESSMENT & PLAN NOTE
Patient/family previously opted for conservative management given advanced age in setting of multiple comorbidities and dementia.

## 2024-12-02 NOTE — ASSESSMENT & PLAN NOTE
BP is well-controlled.  Encourage ambulatory monitoring and low-sodium diet.  Continue amlodipine, lisinopril, Lopressor, Lasix, and Isordil.

## 2024-12-02 NOTE — ASSESSMENT & PLAN NOTE
HR is well-controlled, continue Lopressor.  [id number] at least 7, continue Eliquis 2.5 mg bid (age, creatinine).

## 2024-12-06 ENCOUNTER — HOSPITAL ENCOUNTER (INPATIENT)
Facility: HOSPITAL | Age: 89
LOS: 6 days | Discharge: NURSING, REHAB OR LONG-TERM CARE | DRG: 291 | End: 2024-12-12
Attending: EMERGENCY MEDICINE
Payer: MEDICARE

## 2024-12-06 ENCOUNTER — APPOINTMENT (EMERGENCY)
Dept: RADIOLOGY | Facility: HOSPITAL | Age: 89
DRG: 291 | End: 2024-12-06
Payer: MEDICARE

## 2024-12-06 DIAGNOSIS — S81.801A WOUND OF RIGHT LEG, INITIAL ENCOUNTER: ICD-10-CM

## 2024-12-06 DIAGNOSIS — I50.9 CHF (CONGESTIVE HEART FAILURE) (HCC): Primary | ICD-10-CM

## 2024-12-06 DIAGNOSIS — S81.802A WOUND OF LEFT LEG, INITIAL ENCOUNTER: ICD-10-CM

## 2024-12-06 LAB
2HR DELTA HS TROPONIN: 1 NG/L
4HR DELTA HS TROPONIN: -7 NG/L
ALBUMIN SERPL BCG-MCNC: 3.4 G/DL (ref 3.5–5)
ALP SERPL-CCNC: 40 U/L (ref 34–104)
ALT SERPL W P-5'-P-CCNC: 11 U/L (ref 7–52)
ANION GAP SERPL CALCULATED.3IONS-SCNC: 6 MMOL/L (ref 4–13)
AST SERPL W P-5'-P-CCNC: 10 U/L (ref 13–39)
BASOPHILS # BLD AUTO: 0.01 THOUSANDS/ÂΜL (ref 0–0.1)
BASOPHILS NFR BLD AUTO: 0 % (ref 0–1)
BILIRUB SERPL-MCNC: 0.83 MG/DL (ref 0.2–1)
BNP SERPL-MCNC: 2615 PG/ML (ref 0–100)
BUN SERPL-MCNC: 61 MG/DL (ref 5–25)
CALCIUM ALBUM COR SERPL-MCNC: 9.4 MG/DL (ref 8.3–10.1)
CALCIUM SERPL-MCNC: 8.9 MG/DL (ref 8.4–10.2)
CARDIAC TROPONIN I PNL SERPL HS: 104 NG/L (ref ?–50)
CARDIAC TROPONIN I PNL SERPL HS: 111 NG/L (ref ?–50)
CARDIAC TROPONIN I PNL SERPL HS: 112 NG/L (ref ?–50)
CHLORIDE SERPL-SCNC: 93 MMOL/L (ref 96–108)
CO2 SERPL-SCNC: 34 MMOL/L (ref 21–32)
CREAT SERPL-MCNC: 2.15 MG/DL (ref 0.6–1.3)
EOSINOPHIL # BLD AUTO: 0.01 THOUSAND/ÂΜL (ref 0–0.61)
EOSINOPHIL NFR BLD AUTO: 0 % (ref 0–6)
ERYTHROCYTE [DISTWIDTH] IN BLOOD BY AUTOMATED COUNT: 15.2 % (ref 11.6–15.1)
GFR SERPL CREATININE-BSD FRML MDRD: 26 ML/MIN/1.73SQ M
GLUCOSE SERPL-MCNC: 324 MG/DL (ref 65–140)
GLUCOSE SERPL-MCNC: 352 MG/DL (ref 65–140)
HCT VFR BLD AUTO: 33.4 % (ref 36.5–49.3)
HGB BLD-MCNC: 10.3 G/DL (ref 12–17)
IMM GRANULOCYTES # BLD AUTO: 0.04 THOUSAND/UL (ref 0–0.2)
IMM GRANULOCYTES NFR BLD AUTO: 0 % (ref 0–2)
LYMPHOCYTES # BLD AUTO: 0.43 THOUSANDS/ÂΜL (ref 0.6–4.47)
LYMPHOCYTES NFR BLD AUTO: 5 % (ref 14–44)
MCH RBC QN AUTO: 30.3 PG (ref 26.8–34.3)
MCHC RBC AUTO-ENTMCNC: 30.8 G/DL (ref 31.4–37.4)
MCV RBC AUTO: 98 FL (ref 82–98)
MONOCYTES # BLD AUTO: 0.13 THOUSAND/ÂΜL (ref 0.17–1.22)
MONOCYTES NFR BLD AUTO: 1 % (ref 4–12)
NEUTROPHILS # BLD AUTO: 9.01 THOUSANDS/ÂΜL (ref 1.85–7.62)
NEUTS SEG NFR BLD AUTO: 94 % (ref 43–75)
NRBC BLD AUTO-RTO: 0 /100 WBCS
PLATELET # BLD AUTO: 141 THOUSANDS/UL (ref 149–390)
PMV BLD AUTO: 11.3 FL (ref 8.9–12.7)
POTASSIUM SERPL-SCNC: 4.5 MMOL/L (ref 3.5–5.3)
PROT SERPL-MCNC: 6.1 G/DL (ref 6.4–8.4)
RBC # BLD AUTO: 3.4 MILLION/UL (ref 3.88–5.62)
SODIUM SERPL-SCNC: 133 MMOL/L (ref 135–147)
WBC # BLD AUTO: 9.63 THOUSAND/UL (ref 4.31–10.16)

## 2024-12-06 PROCEDURE — 83880 ASSAY OF NATRIURETIC PEPTIDE: CPT | Performed by: EMERGENCY MEDICINE

## 2024-12-06 PROCEDURE — 82948 REAGENT STRIP/BLOOD GLUCOSE: CPT

## 2024-12-06 PROCEDURE — 93005 ELECTROCARDIOGRAM TRACING: CPT

## 2024-12-06 PROCEDURE — 85025 COMPLETE CBC W/AUTO DIFF WBC: CPT | Performed by: EMERGENCY MEDICINE

## 2024-12-06 PROCEDURE — 83036 HEMOGLOBIN GLYCOSYLATED A1C: CPT

## 2024-12-06 PROCEDURE — 71045 X-RAY EXAM CHEST 1 VIEW: CPT

## 2024-12-06 PROCEDURE — 80053 COMPREHEN METABOLIC PANEL: CPT | Performed by: EMERGENCY MEDICINE

## 2024-12-06 PROCEDURE — 36415 COLL VENOUS BLD VENIPUNCTURE: CPT | Performed by: EMERGENCY MEDICINE

## 2024-12-06 PROCEDURE — 84484 ASSAY OF TROPONIN QUANT: CPT

## 2024-12-06 PROCEDURE — 99285 EMERGENCY DEPT VISIT HI MDM: CPT

## 2024-12-06 PROCEDURE — 99285 EMERGENCY DEPT VISIT HI MDM: CPT | Performed by: EMERGENCY MEDICINE

## 2024-12-06 PROCEDURE — 99223 1ST HOSP IP/OBS HIGH 75: CPT

## 2024-12-06 PROCEDURE — 84484 ASSAY OF TROPONIN QUANT: CPT | Performed by: EMERGENCY MEDICINE

## 2024-12-06 RX ORDER — ASPIRIN 81 MG/1
81 TABLET ORAL DAILY
Status: DISCONTINUED | OUTPATIENT
Start: 2024-12-07 | End: 2024-12-12 | Stop reason: HOSPADM

## 2024-12-06 RX ORDER — LISINOPRIL 20 MG/1
20 TABLET ORAL DAILY
Status: DISCONTINUED | OUTPATIENT
Start: 2024-12-07 | End: 2024-12-12 | Stop reason: HOSPADM

## 2024-12-06 RX ORDER — FINASTERIDE 5 MG/1
5 TABLET, FILM COATED ORAL DAILY
Status: DISCONTINUED | OUTPATIENT
Start: 2024-12-07 | End: 2024-12-12 | Stop reason: HOSPADM

## 2024-12-06 RX ORDER — BUMETANIDE 0.25 MG/ML
2 INJECTION, SOLUTION INTRAMUSCULAR; INTRAVENOUS 2 TIMES DAILY
Status: DISCONTINUED | OUTPATIENT
Start: 2024-12-07 | End: 2024-12-11

## 2024-12-06 RX ORDER — CHLORAL HYDRATE 500 MG
1000 CAPSULE ORAL DAILY
Status: DISCONTINUED | OUTPATIENT
Start: 2024-12-07 | End: 2024-12-12 | Stop reason: HOSPADM

## 2024-12-06 RX ORDER — BUMETANIDE 0.25 MG/ML
2 INJECTION, SOLUTION INTRAMUSCULAR; INTRAVENOUS 2 TIMES DAILY
Status: DISCONTINUED | OUTPATIENT
Start: 2024-12-06 | End: 2024-12-06

## 2024-12-06 RX ORDER — AMLODIPINE BESYLATE 10 MG/1
10 TABLET ORAL DAILY
Status: DISCONTINUED | OUTPATIENT
Start: 2024-12-07 | End: 2024-12-08

## 2024-12-06 RX ORDER — GABAPENTIN 100 MG/1
100 CAPSULE ORAL 2 TIMES DAILY
Status: DISCONTINUED | OUTPATIENT
Start: 2024-12-06 | End: 2024-12-12 | Stop reason: HOSPADM

## 2024-12-06 RX ORDER — INSULIN LISPRO 100 [IU]/ML
1-6 INJECTION, SOLUTION INTRAVENOUS; SUBCUTANEOUS
Status: DISCONTINUED | OUTPATIENT
Start: 2024-12-07 | End: 2024-12-12 | Stop reason: HOSPADM

## 2024-12-06 RX ORDER — ISOSORBIDE DINITRATE 10 MG/1
10 TABLET ORAL 2 TIMES DAILY
Status: DISCONTINUED | OUTPATIENT
Start: 2024-12-06 | End: 2024-12-07

## 2024-12-06 RX ORDER — ASPIRIN 81 MG/1
324 TABLET, CHEWABLE ORAL ONCE
Status: COMPLETED | OUTPATIENT
Start: 2024-12-06 | End: 2024-12-06

## 2024-12-06 RX ORDER — FUROSEMIDE 10 MG/ML
80 INJECTION INTRAMUSCULAR; INTRAVENOUS ONCE
Status: COMPLETED | OUTPATIENT
Start: 2024-12-06 | End: 2024-12-06

## 2024-12-06 RX ORDER — METOPROLOL TARTRATE 25 MG/1
25 TABLET, FILM COATED ORAL DAILY
Status: DISCONTINUED | OUTPATIENT
Start: 2024-12-06 | End: 2024-12-07

## 2024-12-06 RX ADMIN — GABAPENTIN 100 MG: 100 CAPSULE ORAL at 21:30

## 2024-12-06 RX ADMIN — APIXABAN 2.5 MG: 2.5 TABLET, FILM COATED ORAL at 21:30

## 2024-12-06 RX ADMIN — ASPIRIN 324 MG: 81 TABLET, CHEWABLE ORAL at 17:56

## 2024-12-06 RX ADMIN — METOPROLOL TARTRATE 25 MG: 25 TABLET, FILM COATED ORAL at 21:30

## 2024-12-06 RX ADMIN — ISOSORBIDE DINITRATE 10 MG: 10 TABLET ORAL at 21:30

## 2024-12-06 RX ADMIN — FUROSEMIDE 80 MG: 10 INJECTION, SOLUTION INTRAMUSCULAR; INTRAVENOUS at 19:19

## 2024-12-07 PROBLEM — I25.10 CAD S/P PERCUTANEOUS CORONARY ANGIOPLASTY: Status: ACTIVE | Noted: 2024-12-07

## 2024-12-07 PROBLEM — Z98.61 CAD S/P PERCUTANEOUS CORONARY ANGIOPLASTY: Status: ACTIVE | Noted: 2024-12-07

## 2024-12-07 LAB
ANION GAP SERPL CALCULATED.3IONS-SCNC: 6 MMOL/L (ref 4–13)
ATRIAL RATE: 357 BPM
ATRIAL RATE: 375 BPM
BUN SERPL-MCNC: 58 MG/DL (ref 5–25)
CALCIUM SERPL-MCNC: 8.8 MG/DL (ref 8.4–10.2)
CHLORIDE SERPL-SCNC: 94 MMOL/L (ref 96–108)
CO2 SERPL-SCNC: 34 MMOL/L (ref 21–32)
CREAT SERPL-MCNC: 2.17 MG/DL (ref 0.6–1.3)
ERYTHROCYTE [DISTWIDTH] IN BLOOD BY AUTOMATED COUNT: 15.4 % (ref 11.6–15.1)
EST. AVERAGE GLUCOSE BLD GHB EST-MCNC: 197 MG/DL
GFR SERPL CREATININE-BSD FRML MDRD: 26 ML/MIN/1.73SQ M
GLUCOSE SERPL-MCNC: 193 MG/DL (ref 65–140)
GLUCOSE SERPL-MCNC: 263 MG/DL (ref 65–140)
GLUCOSE SERPL-MCNC: 286 MG/DL (ref 65–140)
GLUCOSE SERPL-MCNC: 291 MG/DL (ref 65–140)
GLUCOSE SERPL-MCNC: 336 MG/DL (ref 65–140)
HBA1C MFR BLD: 8.5 %
HCT VFR BLD AUTO: 32.7 % (ref 36.5–49.3)
HGB BLD-MCNC: 10.1 G/DL (ref 12–17)
MCH RBC QN AUTO: 30.7 PG (ref 26.8–34.3)
MCHC RBC AUTO-ENTMCNC: 30.9 G/DL (ref 31.4–37.4)
MCV RBC AUTO: 99 FL (ref 82–98)
PLATELET # BLD AUTO: 130 THOUSANDS/UL (ref 149–390)
PMV BLD AUTO: 11.2 FL (ref 8.9–12.7)
POTASSIUM SERPL-SCNC: 3.9 MMOL/L (ref 3.5–5.3)
QRS AXIS: 100 DEGREES
QRS AXIS: 101 DEGREES
QRSD INTERVAL: 168 MS
QRSD INTERVAL: 168 MS
QT INTERVAL: 412 MS
QT INTERVAL: 446 MS
QTC INTERVAL: 425 MS
QTC INTERVAL: 477 MS
RBC # BLD AUTO: 3.29 MILLION/UL (ref 3.88–5.62)
SODIUM SERPL-SCNC: 134 MMOL/L (ref 135–147)
T WAVE AXIS: -81 DEGREES
T WAVE AXIS: 243 DEGREES
VENTRICULAR RATE: 64 BPM
VENTRICULAR RATE: 69 BPM
WBC # BLD AUTO: 7.17 THOUSAND/UL (ref 4.31–10.16)

## 2024-12-07 PROCEDURE — 82948 REAGENT STRIP/BLOOD GLUCOSE: CPT

## 2024-12-07 PROCEDURE — 85027 COMPLETE CBC AUTOMATED: CPT

## 2024-12-07 PROCEDURE — 93010 ELECTROCARDIOGRAM REPORT: CPT | Performed by: INTERNAL MEDICINE

## 2024-12-07 PROCEDURE — 99232 SBSQ HOSP IP/OBS MODERATE 35: CPT | Performed by: NURSE PRACTITIONER

## 2024-12-07 PROCEDURE — 80048 BASIC METABOLIC PNL TOTAL CA: CPT

## 2024-12-07 PROCEDURE — 99222 1ST HOSP IP/OBS MODERATE 55: CPT | Performed by: INTERNAL MEDICINE

## 2024-12-07 RX ORDER — POLYETHYLENE GLYCOL 3350 17 G/17G
17 POWDER, FOR SOLUTION ORAL DAILY PRN
Status: DISCONTINUED | OUTPATIENT
Start: 2024-12-07 | End: 2024-12-12 | Stop reason: HOSPADM

## 2024-12-07 RX ORDER — METOPROLOL SUCCINATE 50 MG/1
50 TABLET, EXTENDED RELEASE ORAL DAILY
Status: DISCONTINUED | OUTPATIENT
Start: 2024-12-07 | End: 2024-12-12 | Stop reason: HOSPADM

## 2024-12-07 RX ORDER — MAGNESIUM HYDROXIDE/ALUMINUM HYDROXICE/SIMETHICONE 120; 1200; 1200 MG/30ML; MG/30ML; MG/30ML
30 SUSPENSION ORAL EVERY 4 HOURS PRN
Status: DISCONTINUED | OUTPATIENT
Start: 2024-12-07 | End: 2024-12-12 | Stop reason: HOSPADM

## 2024-12-07 RX ADMIN — INSULIN LISPRO 4 UNITS: 100 INJECTION, SOLUTION INTRAVENOUS; SUBCUTANEOUS at 11:35

## 2024-12-07 RX ADMIN — ASPIRIN 81 MG: 81 TABLET, COATED ORAL at 09:29

## 2024-12-07 RX ADMIN — LISINOPRIL 20 MG: 20 TABLET ORAL at 09:29

## 2024-12-07 RX ADMIN — APIXABAN 2.5 MG: 2.5 TABLET, FILM COATED ORAL at 09:30

## 2024-12-07 RX ADMIN — POLYETHYLENE GLYCOL 3350 17 G: 17 POWDER, FOR SOLUTION ORAL at 22:14

## 2024-12-07 RX ADMIN — BUMETANIDE 2 MG: 0.25 INJECTION INTRAMUSCULAR; INTRAVENOUS at 17:50

## 2024-12-07 RX ADMIN — APIXABAN 2.5 MG: 2.5 TABLET, FILM COATED ORAL at 17:51

## 2024-12-07 RX ADMIN — FINASTERIDE 5 MG: 5 TABLET, FILM COATED ORAL at 09:30

## 2024-12-07 RX ADMIN — INSULIN LISPRO 2 UNITS: 100 INJECTION, SOLUTION INTRAVENOUS; SUBCUTANEOUS at 16:30

## 2024-12-07 RX ADMIN — Medication 3 MG: at 21:35

## 2024-12-07 RX ADMIN — GABAPENTIN 100 MG: 100 CAPSULE ORAL at 09:29

## 2024-12-07 RX ADMIN — INSULIN LISPRO 2 UNITS: 100 INJECTION, SOLUTION INTRAVENOUS; SUBCUTANEOUS at 07:30

## 2024-12-07 RX ADMIN — AMLODIPINE BESYLATE 10 MG: 10 TABLET ORAL at 08:00

## 2024-12-07 RX ADMIN — OMEGA-3 FATTY ACIDS CAP 1000 MG 1000 MG: 1000 CAP at 09:30

## 2024-12-07 RX ADMIN — GABAPENTIN 100 MG: 100 CAPSULE ORAL at 17:51

## 2024-12-07 RX ADMIN — Medication 1000 UNITS: at 09:29

## 2024-12-07 RX ADMIN — BUMETANIDE 2 MG: 0.25 INJECTION INTRAMUSCULAR; INTRAVENOUS at 09:30

## 2024-12-07 NOTE — ASSESSMENT & PLAN NOTE
Patient son reports that patient used to be off of oxygen 1 to lift at home and ever since moved to facility his being on 1 to 2 L.  Currently requiring 1 to 2 L on admission.  Chest x-ray with interstitial edema per my reading pending final radiology reading  Will continue with IV diuretics and hopefully wean patient off oxygen if possible.

## 2024-12-07 NOTE — ASSESSMENT & PLAN NOTE
"Lab Results   Component Value Date    HGBA1C 7.1 (H) 06/11/2024       No results for input(s): \"POCGLU\" in the last 72 hours.    Blood Sugar Average: Last 72 hrs:  History of diabetes patient takes glipizide.  No insulin.  Will start insulin sliding scale.  Monitor for Accu-Cheks or times a day.  Hypoglycemia protocol and diabetic diet    "

## 2024-12-07 NOTE — ASSESSMENT & PLAN NOTE
Wt Readings from Last 3 Encounters:   12/06/24 98.5 kg (217 lb 2.5 oz)   12/02/24 96.6 kg (213 lb)   10/30/24 91.6 kg (201 lb 15.1 oz)   Hypervolemic on exam.  BNP 2615, CXR shows moderate pulmonary venous congestion with trace pleural effusions.  Continue IV Bumex 2 mg bid.  Of note, at home patient was taking Lasix 80 mg bid.  Recommend strict I/O's, daily weights, and sodium restriction.

## 2024-12-07 NOTE — ASSESSMENT & PLAN NOTE
Wt Readings from Last 3 Encounters:   12/06/24 98.5 kg (217 lb 2.5 oz)   12/02/24 96.6 kg (213 lb)   10/30/24 91.6 kg (201 lb 15.1 oz)     Lab Results   Component Value Date    BNP 2,615 (H) 12/06/2024      Secondary to due to fluid overload.  Patient presents with lower extremity edema and fluid extravasation from the skin.  Significant weight gain compared to last office visit 4 days ago.  Patient supposed to take Lasix 80 mg twice a day.  Patient lives in a facility, per son he is compliant with meds as well as diet.  Follows with cardiology and last visit on 12/2 patient was euvolemic.  Plan:  Monitor strict I's and O's and daily weight  Cardiac diet with fluid restriction  Cardiology consulted  Continue IV Bumex 2 mg twice daily  Home Lasix dose is 80 mg twice daily  Echocardiogram pending   [employer]

## 2024-12-07 NOTE — ASSESSMENT & PLAN NOTE
Lab Results   Component Value Date    HGBA1C 7.1 (H) 06/11/2024     Recent Labs     12/06/24 2110 12/07/24  0730   POCGLU 324* 291*   Blood Sugar Average: Last 72 hrs:  (P) 307.5  Management per primary service.

## 2024-12-07 NOTE — ASSESSMENT & PLAN NOTE
HR is well-controlled, transition to Toprol-XL 50 mg daily.  [id number] at least 7, continue Eliquis 2.5 mg bid (age, creatinine).

## 2024-12-07 NOTE — ASSESSMENT & PLAN NOTE
Lab Results   Component Value Date    EGFR 26 12/06/2024    EGFR 26 11/18/2024    EGFR 27 10/30/2024    CREATININE 2.15 (H) 12/06/2024    CREATININE 2.16 (H) 11/18/2024    CREATININE 2.08 (H) 10/30/2024     Creatinine baseline in low to.  Does not appear worsening in kidney function as of admission labs.  Continue monitoring in the setting of IV diuretics.  Avoid nephrotoxic agents and hypoperfusion.  Monitor I's and O's

## 2024-12-07 NOTE — ASSESSMENT & PLAN NOTE
Patient on multiple blood pressure medication including amlodipine, metoprolol, isosorbide, lisinopril, Lasix.  Will continue blood pressure medications given high blood pressure on admission.

## 2024-12-07 NOTE — H&P
"H&P - Hospitalist   Name: Tom Stewart 88 y.o. male I MRN: [id number]  Unit/Bed#: 2 E 264-01 I Date of Admission: 12/6/2024   Date of Service: 12/6/2024 I Hospital Day: 0     Assessment & Plan  Acute on chronic heart failure with preserved ejection fraction (HCC)  Wt Readings from Last 3 Encounters:   12/06/24 98.5 kg (217 lb 2.5 oz)   12/02/24 96.6 kg (213 lb)   10/30/24 91.6 kg (201 lb 15.1 oz)     Lab Results   Component Value Date    BNP 2,615 (H) 12/06/2024    BNP 2,979 (H) 11/18/2024    BNP 2,374 (H) 10/25/2024        On presentation due to fluid overload.  Patient presents with lower extremity edema and fluid extravasation from the skin.  Did weight gain compared to last office visit 4 days ago.  Patient supposed to take Lasix 80 mg twice a day.  Patient lives in a facility, per son he is compliant with meds as well as diet.  Follows with cardiology and last visit on 12/2 patient was euvolemic.  Plan:  Given fluid overload despite high-dose of Lasix will transition to Bumex 2 mg twice a day.  Received a dose of IV Lasix 80 mg  Monitor strict I's and O's and daily weight  Cardiac diet with fluid restriction  Cardiology consulted  Echo pending.        Coronary artery disease involving native coronary artery of native heart without angina pectoris  History of ischemic heart disease.  S/p PCI in 2020.  Continue aspirin, metoprolol  Per cardiology notes patient allergic to statines.  Paroxysmal atrial fibrillation (HCC)  Patient in A-fib with rate controlled on admission  Patient takes Eliquis 2.5 mg twice a day.  And metoprolol.  Will continue home regiment  Type 2 diabetes mellitus with stage 4 chronic kidney disease, without long-term current use of insulin (HCC)  Lab Results   Component Value Date    HGBA1C 7.1 (H) 06/11/2024       No results for input(s): \"POCGLU\" in the last 72 hours.    Blood Sugar Average: Last 72 hrs:  History of diabetes patient takes glipizide.  No insulin.  Will start insulin sliding " scale.  Monitor for Accu-Cheks or times a day.  Hypoglycemia protocol and diabetic diet    Stage 4 chronic kidney disease (HCC)  Lab Results   Component Value Date    EGFR 26 12/06/2024    EGFR 26 11/18/2024    EGFR 27 10/30/2024    CREATININE 2.15 (H) 12/06/2024    CREATININE 2.16 (H) 11/18/2024    CREATININE 2.08 (H) 10/30/2024     Creatinine baseline in low to.  Does not appear worsening in kidney function as of admission labs.  Continue monitoring in the setting of IV diuretics.  Avoid nephrotoxic agents and hypoperfusion.  Monitor I's and O's  Primary hypertension  Patient on multiple blood pressure medication including amlodipine, metoprolol, isosorbide, lisinopril, Lasix.  Will continue blood pressure medications given high blood pressure on admission.  Other Alzheimer's disease (HCC)  Patient with baseline dementia.  On assessment pleasantly confused.  Moderate aortic stenosis  Patient follows with cardiology in the office for routine monitoring.  Acute respiratory failure with hypoxia (HCC)  Patient son reports that patient used to be off of oxygen 1 to lift at home and ever since moved to facility his being on 1 to 2 L.  Currently requiring 1 to 2 L on admission.  Chest x-ray with interstitial edema per my reading pending final radiology reading  Will continue with IV diuretics and hopefully wean patient off oxygen if possible.        VTE Pharmacologic Prophylaxis: VTE Score: 5 High Risk (Score >/= 5) - Pharmacological DVT Prophylaxis Ordered: apixaban (Eliquis). Sequential Compression Devices Ordered.  Code Status: Level 1 - Full Code patient and son  Discussion with family: Updated  (son) via phone.    Anticipated Length of Stay: Patient will be admitted on an inpatient basis with an anticipated length of stay of greater than 2 midnights secondary to CHF exacerbation.    History of Present Illness   Chief Complaint: Fluid overload    Tom Stewart is a 88 y.o. male with a PMH of CAD s/p  PCI, CHF, hypertension, hyperlipidemia, type 2 diabetes mellitus, atrial fibrillation on Eliquis who presents with CHF exacerbation.  Mentions that since Monday patient appeared volume overloaded with lower extremity edema and was very lethargic at that time.  When he checked today he noted fluid draining through the skin from the lower extremities and made him come to the ED for CHF management.  Patient with dementia however denies any shortness of breath, chest pain.  Patient had recent office visit with cardiology on 12/2 and at that time the weight was 213lb  on admission the weight is 217lb.  Previously admitted for CHF exacerbation in October.    Review of Systems   Constitutional:  Negative for chills and fever.   HENT:  Negative for ear pain and sore throat.    Eyes:  Negative for pain and visual disturbance.   Respiratory:  Negative for cough and shortness of breath.    Cardiovascular:  Positive for leg swelling. Negative for chest pain and palpitations.   Gastrointestinal:  Negative for abdominal pain and vomiting.   Genitourinary:  Negative for dysuria and hematuria.   Musculoskeletal:  Negative for arthralgias and back pain.   Skin:  Negative for color change and rash.   Neurological:  Negative for seizures and syncope.   All other systems reviewed and are negative.      Historical Information   Past Medical History:   Diagnosis Date    Acute deep vein thrombosis (DVT) of brachial vein of left upper extremity (HCC) 11/24/2017    Acute deep vein thrombosis (DVT) of left peroneal vein ([facility])     Acute ST elevation myocardial infarction (HCC)     Aortic valve stenosis     Arthritis     Atrial fibrillation (HCC)     Basilar artery stenosis     Basilar artery stenosis     Basilar artery stenosis 05/04/2020    MRA Head wo contrast (12/13/2019)  IMPRESSION:   Multifocal intracranial atherosclerotic disease with moderate to severe stenosis at the origin of the left M1 segment with additional atherosclerotic  disease noted in the distal right M1 and proximal inferior left M2 branches.   Moderate to severe stenosis in the proximal and mid basilar artery with nonvisualization of the right intradural vertebral    Benign prostatic hyperplasia with lower urinary tract symptoms     CAD (coronary artery disease)     CAD in native artery 11/24/2017    Underwent cardiac catheterization on 1/30/2020 and had mid and distal LAD stent placed  Cardiac PCI (1/30/2020)  SUMMARY   CORONARY CIRCULATION: Mid LAD: There was a 90 % stenosis at the site of a prior stent. Distal LAD: There was a 90 % stenosis at the site of a prior stent. Left posterior descending artery: There was a diffuse 50 % stenosis.   1ST LESION INTERVENTIONS: A balloon angioplasty wit    Cerebrovascular small vessel disease 11/12/2020    Chronic kidney disease     Closed fracture of multiple ribs of left side with routine healing 09/03/2020    Coronary artery disease     Dementia (HCC)     Diabetes mellitus (HCC)     DM2 (diabetes mellitus, type 2) ([facility])     Elevated troponin 07/19/2021    Herpes zoster without complication 07/28/2021    History of CVA (cerebrovascular accident) 02/21/2019    History of DVT of peroneal vein left lower extremity 12/16/2019    History of transfusion     HLD (hyperlipidemia)     HTN (hypertension)     Infected sebaceous cyst of skin 06/08/2021    Lump in chest 06/01/2021    Middle cerebral artery stenosis     Mild to moderate aortic stenosis 10/09/2018    ECHO (7/2020)  AORTIC VALVE: Leaflets exhibited moderately increased thickness and moderate calcification. Transaortic velocity was increased due to valvular stenosis. There was mild to moderate stenosis. RICHY 1.4cm, mean pressure gradient 11mmHg, peak velocity 2.15m/s There was mild regurgitation.    Myocardial infarction (HCC)     Near syncope 07/19/2021    Other proteinuria 10/08/2019    Proteinuria     Rib fractures (right) 07/31/2020    Stroke ([facility])     Symptomatic bradycardia      Transient cerebral ischemia     Vitamin D deficiency      Past Surgical History:   Procedure Laterality Date    CARDIAC CATHETERIZATION      Outcome: successful; last assessed: 02/03/2015    CARDIAC CATHETERIZATION  11/26/2019    CORONARY ANGIOPLASTY WITH STENT PLACEMENT  2008    stent to LAD     CORONARY ARTERY BYPASS GRAFT      HAND SURGERY      thumb    HIP HARDWARE REMOVAL      JOINT REPLACEMENT      TOTAL HIP ARTHROPLASTY Right     TOTAL HIP ARTHROPLASTY Bilateral      Social History     Tobacco Use    Smoking status: Never     Passive exposure: Never    Smokeless tobacco: Never   Vaping Use    Vaping status: Never Used   Substance and Sexual Activity    Alcohol use: Never    Drug use: Never    Sexual activity: Not Currently     Partners: Female     Birth control/protection: None     E-Cigarette/Vaping    E-Cigarette Use Never User      E-Cigarette/Vaping Substances    Nicotine No     THC No     CBD No     Flavoring No     Other No     Unknown No        Social History:  Marital Status:    Occupation:   Patient Pre-hospital Living Situation: Skilled Nursing Facility: [facility]  Patient Pre-hospital Level of Mobility: walks  Patient Pre-hospital Diet Restrictions: Cardiac diet    Meds/Allergies   I have reviewed home medications using recent Epic encounter.  Prior to Admission medications    Medication Sig Start Date End Date Taking? Authorizing Provider   acetaminophen (TYLENOL) 500 mg tablet Take 500 mg by mouth every 6 (six) hours as needed for mild pain    Historical Provider, MD   ALPRAZolam (NIRAVAM) 0.25 MG dissolvable tablet Take 0.25 mg by mouth daily at bedtime as needed for anxiety    Historical Provider, MD   amLODIPine (NORVASC) 10 mg tablet TAKE 1 TABLET BY MOUTH DAILY 9/26/24   Beto Garnett DO   apixaban (ELIQUIS) 2.5 mg Take 1 tablet (2.5 mg total) by mouth 2 (two) times a day 1/10/24   Katlin Sweeney MD   aspirin (ECOTRIN LOW STRENGTH) 81 mg EC tablet Take 1 tablet (81  mg total) by mouth daily 11/30/21   Katlin Sweeney MD   Blood Glucose Monitoring Suppl (ONE TOUCH ULTRA MINI) w/Device KIT Use daily 11/12/23   Beto Garnett, DO   cholecalciferol (VITAMIN D3) 1,000 units tablet Take 1,000 Units by mouth daily    Historical Provider, MD   finasteride (PROSCAR) 5 mg tablet TAKE 1 TABLET BY MOUTH DAILY 7/16/24   [facility], [facility]   furosemide (LASIX) 20 mg tablet Take 4 tablets (80 mg total) by mouth 2 (two) times a day 11/11/24 12/11/24  Ignacio Richards PA-C   gabapentin (NEURONTIN) 100 mg capsule TAKE 1 CAPSULE BY MOUTH TWICE  DAILY 9/26/24   [facility], DO   glipiZIDE (GLUCOTROL XL) 2.5 mg 24 hr tablet TAKE 1 TABLET BY MOUTH DAILY 6/12/24   [facility], [facility]   glucose blood (OneTouch Ultra) test strip Check blood sugars once daily. Please substitute with appropriate alternative as covered by patient's insurance. Dx: E11.65 3/7/24   Beto Russell[facility]hel[facility], DO   isosorbide dinitrate (ISORDIL) 10 mg tablet TAKE 1 TABLET BY MOUTH TWICE  DAILY 11/9/23   [facility], DO   lisinopril (ZESTRIL) 20 mg tablet TAKE 1 TABLET BY MOUTH DAILY 11/9/23   [facility], DO   metoprolol tartrate (LOPRESSOR) 25 mg tablet Take 1 tablet (25 mg total) by mouth in the morning 10/31/24 12/2/24  Emilie Soyeon Kim, MD   Omega-3 Fatty Acids (FISH OIL CONCENTRATE PO) Take 1 tablet by mouth daily    Historical Provider, MD   vitamin B-12 (CYANOCOBALAMIN) 100 MCG tablet Take 1,000 mcg by mouth daily    Historical Provider, MD     Allergies   Allergen Reactions    Celecoxib Other (See Comments) and Hives     Per pt does NOT remember type of reaction or severity level    Hydromorphone Other (See Comments) and Hives     Per pt does NOT remember type of reaction or severity level    Pravastatin Hives    Statins Other (See Comments) and Hives     Per pt does NOT remember type of reaction or severity level       Objective :  Temp:  [97.8 °F (36.6 °C)] 97.8 °F (36.6 °C)  HR:  [69-77] 69  BP:  (162-174)/(88-96) 162/88  Resp:  [18-19] 18  SpO2:  [96 %-98 %] 97 %  O2 Device: Nasal cannula  Nasal Cannula O2 Flow Rate (L/min):  [1 L/min-2 L/min] 1 L/min    Physical Exam  Vitals and nursing note reviewed.   Constitutional:       General: He is not in acute distress.     Appearance: He is well-developed. He is not ill-appearing.   HENT:      Head: Normocephalic and atraumatic.   Eyes:      Conjunctiva/sclera: Conjunctivae normal.   Cardiovascular:      Rate and Rhythm: Normal rate and regular rhythm.      Heart sounds: No murmur heard.  Pulmonary:      Effort: Pulmonary effort is normal. No respiratory distress.      Breath sounds: Rales present. No wheezing.   Abdominal:      General: Bowel sounds are normal.      Palpations: Abdomen is soft.      Tenderness: There is no abdominal tenderness.   Musculoskeletal:         General: No swelling.      Cervical back: Neck supple.      Right lower leg: Edema present.      Left lower leg: Edema present.   Skin:     General: Skin is warm and dry.      Capillary Refill: Capillary refill takes less than 2 seconds.   Neurological:      Mental Status: He is alert. Mental status is at baseline. He is disoriented.   Psychiatric:         Mood and Affect: Mood normal.          Lines/Drains:            Lab Results: I have reviewed the following results:  Results from last 7 days   Lab Units 12/06/24  1707   WBC Thousand/uL 9.63   HEMOGLOBIN g/dL 10.3*   HEMATOCRIT % 33.4*   PLATELETS Thousands/uL 141*   SEGS PCT % 94*   LYMPHO PCT % 5*   MONO PCT % 1*   EOS PCT % 0     Results from last 7 days   Lab Units 12/06/24  1803   SODIUM mmol/L 133*   POTASSIUM mmol/L 4.5   CHLORIDE mmol/L 93*   CO2 mmol/L 34*   BUN mg/dL 61*   CREATININE mg/dL 2.15*   ANION GAP mmol/L 6   CALCIUM mg/dL 8.9   ALBUMIN g/dL 3.4*   TOTAL BILIRUBIN mg/dL 0.83   ALK PHOS U/L 40   ALT U/L 11   AST U/L 10*   GLUCOSE RANDOM mg/dL 352*             Lab Results   Component Value Date    HGBA1C 7.1 (H) 06/11/2024     HGBA1C 7.2 (H) 01/30/2024    HGBA1C 6.6 (H) 07/19/2023           Imaging Results Review: I reviewed radiology reports from this admission including: chest xray.  Other Study Results Review: EKG was reviewed.     Administrative Statements       ** Please Note: This note has been constructed using a voice recognition system. **

## 2024-12-07 NOTE — ASSESSMENT & PLAN NOTE
Lab Results   Component Value Date    EGFR 26 12/07/2024    EGFR 26 12/06/2024    EGFR 26 11/18/2024    CREATININE 2.17 (H) 12/07/2024    CREATININE 2.15 (H) 12/06/2024    CREATININE 2.16 (H) 11/18/2024     Creatinine baseline in low to.  Does not appear worsening in kidney function as of admission labs.  Continue monitoring in the setting of IV diuretics.  Avoid nephrotoxic agents and hypoperfusion.  Monitor I's and O's

## 2024-12-07 NOTE — ASSESSMENT & PLAN NOTE
Cardiac catheterization in 1/2020 revealed 90% ISR of mLAD and dLAD.  Patient received PCI with balloon angioplasty and CONCEPCION to each of the lesions.    Continue ASA and amlodipine.  Transition to Toprol-XL 50 mg daily.  Discontinue Isordil.  Not on statin due to history of allergy (hives).

## 2024-12-07 NOTE — ASSESSMENT & PLAN NOTE
Patient/family previously opted for conservative management given advanced age in setting of multiple comorbidities including dementia.

## 2024-12-07 NOTE — ASSESSMENT & PLAN NOTE
Lab Results   Component Value Date    HGBA1C 7.1 (H) 06/11/2024       Recent Labs     12/06/24  2110 12/07/24  0730   POCGLU 324* 291*       Blood Sugar Average: Last 72 hrs:  (P) 307.5  History of diabetes patient takes glipizide.  No insulin.  Will start insulin sliding scale.  Monitor for Accu-Cheks or times a day.  Hypoglycemia protocol and diabetic diet

## 2024-12-07 NOTE — ASSESSMENT & PLAN NOTE
History of ischemic heart disease.  S/p PCI in 2020.  Continue aspirin, metoprolol  Per cardiology notes patient allergic to statin

## 2024-12-07 NOTE — ASSESSMENT & PLAN NOTE
BP is elevated, continue to monitor.  Transition to Toprol-XL and discontinue Isordil as per above.  Continue amlodipine, lisinopril, and Bumex.

## 2024-12-07 NOTE — ASSESSMENT & PLAN NOTE
Lab Results   Component Value Date    EGFR 26 12/07/2024    EGFR 26 12/06/2024    EGFR 26 11/18/2024    CREATININE 2.17 (H) 12/07/2024    CREATININE 2.15 (H) 12/06/2024    CREATININE 2.16 (H) 11/18/2024   Management per primary service.

## 2024-12-07 NOTE — ASSESSMENT & PLAN NOTE
Lab Results   Component Value Date    HGBA1C 8.5 (H) 12/06/2024       Recent Labs     12/07/24  1137 12/07/24  1654 12/07/24 2052 12/08/24  0735   POCGLU 286* 193* 263* 240*       Blood Sugar Average: Last 72 hrs:  (P) [id number]  History of diabetes patient takes glipizide.  No insulin.  Will start insulin sliding scale.  Monitor for Accu-Cheks or times a day.  Hypoglycemia protocol and diabetic diet

## 2024-12-07 NOTE — ASSESSMENT & PLAN NOTE
Wt Readings from Last 3 Encounters:   12/06/24 98.5 kg (217 lb 2.5 oz)   12/02/24 96.6 kg (213 lb)   10/30/24 91.6 kg (201 lb 15.1 oz)     Lab Results   Component Value Date    BNP 2,615 (H) 12/06/2024    BNP 2,979 (H) 11/18/2024    BNP 2,374 (H) 10/25/2024        On presentation due to fluid overload.  Patient presents with lower extremity edema and fluid extravasation from the skin.  Did weight gain compared to last office visit 4 days ago.  Patient supposed to take Lasix 80 mg twice a day.  Patient lives in a facility, per son he is compliant with meds as well as diet.  Follows with cardiology and last visit on 12/2 patient was euvolemic.  Plan:  Given fluid overload despite high-dose of Lasix will transition to Bumex 2 mg twice a day.  Received a dose of IV Lasix 80 mg  Monitor strict I's and O's and daily weight  Cardiac diet with fluid restriction  Cardiology consulted  Echo pending.

## 2024-12-07 NOTE — CONSULTS
Consultation - Cardiology   Tom Stewart 88 y.o. male MRN: [id number]  Unit/Bed#: 2 E 264-01 Encounter: [id number]  12/07/24  8:03 AM    Assessment & Plan  Acute on chronic heart failure with preserved ejection fraction (HCC)  Wt Readings from Last 3 Encounters:   12/06/24 98.5 kg (217 lb 2.5 oz)   12/02/24 96.6 kg (213 lb)   10/30/24 91.6 kg (201 lb 15.1 oz)   Hypervolemic on exam.  BNP 2615, CXR shows moderate pulmonary venous congestion with trace pleural effusions.  Continue IV Bumex 2 mg bid.  Of note, at home patient was taking Lasix 80 mg bid.  Recommend strict I/O's, daily weights, and sodium restriction.  CAD s/p PCI to LAD  Cardiac catheterization in 1/2020 revealed 90% ISR of mLAD and dLAD.  Patient received PCI with balloon angioplasty and CONCEPCION to each of the lesions.    Continue ASA and amlodipine.  Transition to Toprol-XL 50 mg daily.  Discontinue Isordil.  Not on statin due to history of allergy (hives).  Paroxysmal atrial fibrillation (HCC)  HR is well-controlled, transition to Toprol-XL 50 mg daily.  [id number] at least 7, continue Eliquis 2.5 mg bid (age, creatinine).  Moderate aortic stenosis  Patient/family previously opted for conservative management given advanced age in setting of multiple comorbidities including dementia.  Primary hypertension  BP is elevated, continue to monitor.  Transition to Toprol-XL and discontinue Isordil as per above.  Continue amlodipine, lisinopril, and Bumex.  Type 2 diabetes mellitus with stage 4 chronic kidney disease, without long-term current use of insulin ([facility])  Lab Results   Component Value Date    HGBA1C 7.1 (H) 06/11/2024     Recent Labs     12/06/24  2110 12/07/24  0730   POCGLU 324* 291*   Blood Sugar Average: Last 72 hrs:  (P) 307.5  Management per primary service.  Stage 4 chronic kidney disease ([facility])  Lab Results   Component Value Date    EGFR 26 12/07/2024    EGFR 26 12/06/2024    EGFR 26 11/18/2024    CREATININE 2.17 (H) 12/07/2024    CREATININE  2.15 (H) 12/06/2024    CREATININE 2.16 (H) 11/18/2024   Management per primary service.  Other Alzheimer's disease (HCC)  Management per primary service.        History of Present Illness   Physician Requesting Consult: Mateus Guidry,*    Reason for Consult / Principal Problem: Acute on chronic HFpEF    HPI: Tom Stewart is a 88 y.o. year old male with history of chronic HFpEF, CAD, paroxysmal atrial fibrillation on a low-dose Eliquis, moderate aortic stenosis, hypertension, hyperlipidemia, T2DM, CKD 4, CVA, and Alzheimer's dementia who presents with progressively worsening lower extremity edema.  Unfortunately, patient is a poor historian due to dementia so history was obtained predominantly through chart review.  Patient was recently evaluated by cardiology during office visit on 12/2/2024.  His weight at that time was 213 pounds, patient's bed weight upon admission was 217 pounds.  Standing weight from today was requested, however not yet completed.  Patient is unable to recall why he is admitted, however denies chest pain or any additional complaints at this time.  He currently resides at [facility].  Follows with Dr. Sweeney as primary cardiologist.    Inpatient consult to Cardiology  Consult performed by: Ignacio Richards PA-C  Consult ordered by: Cristina James MD          Review of Systems   Unable to perform ROS: Dementia       Historical Information   Past Medical History:   Diagnosis Date    Acute deep vein thrombosis (DVT) of brachial vein of left upper extremity (HCC) 11/24/2017    Acute deep vein thrombosis (DVT) of left peroneal vein (HCC)     Acute ST elevation myocardial infarction (HCC)     Aortic valve stenosis     Arthritis     Atrial fibrillation (HCC)     Basilar artery stenosis     Basilar artery stenosis     Basilar artery stenosis 05/04/2020    MRA Head wo contrast (12/13/2019)  IMPRESSION:   Multifocal intracranial atherosclerotic disease with moderate to  severe stenosis at the origin of the left M1 segment with additional atherosclerotic disease noted in the distal right M1 and proximal inferior left M2 branches.   Moderate to severe stenosis in the proximal and mid basilar artery with nonvisualization of the right intradural vertebral    Benign prostatic hyperplasia with lower urinary tract symptoms     CAD (coronary artery disease)     CAD in native artery 11/24/2017    Underwent cardiac catheterization on 1/30/2020 and had mid and distal LAD stent placed  Cardiac PCI (1/30/2020)  SUMMARY   CORONARY CIRCULATION: Mid LAD: There was a 90 % stenosis at the site of a prior stent. Distal LAD: There was a 90 % stenosis at the site of a prior stent. Left posterior descending artery: There was a diffuse 50 % stenosis.   1ST LESION INTERVENTIONS: A balloon angioplasty wit    Cerebrovascular small vessel disease 11/12/2020    Chronic kidney disease     Closed fracture of multiple ribs of left side with routine healing 09/03/2020    Coronary artery disease     Dementia (HCC)     Diabetes mellitus (HCC)     DM2 (diabetes mellitus, type 2) (HCC)     Elevated troponin 07/19/2021    Herpes zoster without complication 07/28/2021    History of CVA (cerebrovascular accident) 02/21/2019    History of DVT of peroneal vein left lower extremity 12/16/2019    History of transfusion     HLD (hyperlipidemia)     HTN (hypertension)     Infected sebaceous cyst of skin 06/08/2021    Lump in chest 06/01/2021    Middle cerebral artery stenosis     Mild to moderate aortic stenosis 10/09/2018    ECHO (7/2020)  AORTIC VALVE: Leaflets exhibited moderately increased thickness and moderate calcification. Transaortic velocity was increased due to valvular stenosis. There was mild to moderate stenosis. RICHY 1.4cm, mean pressure gradient 11mmHg, peak velocity 2.15m/s There was mild regurgitation.    Myocardial infarction (HCC)     Near syncope 07/19/2021    Other proteinuria 10/08/2019    Proteinuria   "   Rib fractures (right) 2020    Stroke (HCC)     Symptomatic bradycardia     Transient cerebral ischemia     Vitamin D deficiency      Past Surgical History:   Procedure Laterality Date    CARDIAC CATHETERIZATION      Outcome: successful; last assessed: 2015    CARDIAC CATHETERIZATION  2019    CORONARY ANGIOPLASTY WITH STENT PLACEMENT      stent to LAD     CORONARY ARTERY BYPASS GRAFT      HAND SURGERY      thumb    HIP HARDWARE REMOVAL      JOINT REPLACEMENT      TOTAL HIP ARTHROPLASTY Right     TOTAL HIP ARTHROPLASTY Bilateral      Social History     Substance and Sexual Activity   Alcohol Use Never     Social History     Substance and Sexual Activity   Drug Use Never     Social History     Tobacco Use   Smoking Status Never    Passive exposure: Never   Smokeless Tobacco Never       Family History:   Family History   Problem Relation Age of Onset    Emphysema Father     Emphysema Sister        Meds/Allergies   all current active meds have been reviewed  Allergies   Allergen Reactions    Celecoxib Other (See Comments) and Hives     Per pt does NOT remember type of reaction or severity level    Hydromorphone Other (See Comments) and Hives     Per pt does NOT remember type of reaction or severity level    Pravastatin Hives    Statins Other (See Comments) and Hives     Per pt does NOT remember type of reaction or severity level       Objective   Vitals: Blood pressure 170/88, pulse 69, temperature 97.7 °F (36.5 °C), temperature source Oral, resp. rate 18, height 5' 10\" (1.778 m), weight 98.5 kg (217 lb 2.5 oz), SpO2 97%., Body mass index is 31.16 kg/m².,   Orthostatic Blood Pressures      Flowsheet Row Most Recent Value   Blood Pressure 170/88 filed at 2024 0700   Patient Position - Orthostatic VS Lying filed at 2024 0700            Systolic (24hrs), Av , Min:161 , Max:174     Diastolic (24hrs), Av, Min:88, Max:96        Intake/Output Summary (Last 24 hours) at 2024 " 0803  Last data filed at 12/7/2024 0603  Gross per 24 hour   Intake --   Output 1200 ml   Net -1200 ml       Invasive Devices       Peripheral Intravenous Line  Duration             Peripheral IV 12/06/24 Right Antecubital <1 day                        Physical Exam:  GEN: Alert and in no acute distress.  Well appearing and well nourished.   HEENT: Sclera anicteric, conjunctivae pink, mucous membranes moist. Oropharynx clear.   NECK: Supple, no carotid bruits, no significant JVD. Trachea midline, no thyromegaly.   HEART: Regular rhythm, normal S1 and S2, no murmurs, clicks, gallops or rubs. PMI nondisplaced, no thrills.   LUNGS: Decreased basilar breath sounds bilaterally; no wheezes, rales, or rhonchi. No increased work of breathing or signs of respiratory distress.   ABDOMEN: Soft, nontender, nondistended, normoactive bowel sounds.   EXTREMITIES: Skin warm and well perfused, no clubbing or cyanosis, + BL LE edema.  NEURO: No focal findings. Normal speech. Mood and affect normal.   SKIN: Normal without suspicious lesions on exposed skin.    Lab Results:     Cardiac Profile:   Results from last 7 days   Lab Units 12/06/24 2124 12/06/24  1919 12/06/24  1707   HS TNI 0HR ng/L  --   --  111*   HS TNI 2HR ng/L  --  112*  --    HSTNI D2 ng/L  --  1  --    HS TNI 4HR ng/L 104*  --   --    HSTNI D4 ng/L -7  --   --        CBC with diff:   Results from last 7 days   Lab Units 12/07/24  0555 12/06/24  1707   WBC Thousand/uL 7.17 9.63   HEMOGLOBIN g/dL 10.1* 10.3*   HEMATOCRIT % 32.7* 33.4*   MCV fL 99* 98   PLATELETS Thousands/uL 130* 141*   RBC Million/uL 3.29* 3.40*   MCH pg 30.7 30.3   MCHC g/dL 30.9* 30.8*   RDW % 15.4* 15.2*   MPV fL 11.2 11.3   NRBC AUTO /100 WBCs  --  0         CMP:   Results from last 7 days   Lab Units 12/07/24  0555 12/06/24  1803   POTASSIUM mmol/L 3.9 4.5   CHLORIDE mmol/L 94* 93*   CO2 mmol/L 34* 34*   BUN mg/dL 58* 61*   CREATININE mg/dL 2.17* 2.15*   CALCIUM mg/dL 8.8 8.9   AST U/L  --  10*    ALT U/L  --  11   ALK PHOS U/L  --  40   EGFR ml/min/1.73sq m 26 26

## 2024-12-07 NOTE — ASSESSMENT & PLAN NOTE
Continue ASA and amlodipine.  Transition to Toprol-XL 50 mg daily.  Discontinue Isordil.  Not on statin due to history of allergy (hives).

## 2024-12-07 NOTE — ASSESSMENT & PLAN NOTE
Lab Results   Component Value Date    EGFR 26 12/07/2024    EGFR 26 12/06/2024    EGFR 26 11/18/2024    CREATININE 2.17 (H) 12/07/2024    CREATININE 2.15 (H) 12/06/2024    CREATININE 2.16 (H) 11/18/2024     Baseline is 1.9-2.5; stable  Continue monitoring in the setting of IV diuretics.  Avoid nephrotoxic agents and hypoperfusion.  Monitor I's and O's

## 2024-12-07 NOTE — ASSESSMENT & PLAN NOTE
Wt Readings from Last 3 Encounters:   12/06/24 98.5 kg (217 lb 2.5 oz)   12/02/24 96.6 kg (213 lb)   10/30/24 91.6 kg (201 lb 15.1 oz)     Lab Results   Component Value Date    BNP 2,615 (H) 12/06/2024      Secondary to due to fluid overload.  Patient presents with lower extremity edema and fluid extravasation from the skin.  Significant weight gain compared to last office visit 4 days ago.  Patient supposed to take Lasix 80 mg twice a day.  Patient lives in a facility, per son he is compliant with meds as well as diet.  Follows with cardiology and last visit on 12/2 patient was euvolemic.  Plan:  Given fluid overload despite high-dose of Lasix will transition to Bumex 2 mg twice a day.  Received a dose of IV Lasix 80 mg  Monitor strict I's and O's and daily weight  Cardiac diet with fluid restriction  Cardiology consulted  Echo pending.

## 2024-12-07 NOTE — PLAN OF CARE
Problem: PAIN - ADULT  Goal: Verbalizes/displays adequate comfort level or baseline comfort level  Description: Interventions:  - Encourage patient to monitor pain and request assistance  - Assess pain using appropriate pain scale  - Administer analgesics based on type and severity of pain and evaluate response  - Implement non-pharmacological measures as appropriate and evaluate response  - Consider cultural and social influences on pain and pain management  - Notify physician/advanced practitioner if interventions unsuccessful or patient reports new pain  Outcome: Progressing     Problem: INFECTION - ADULT  Goal: Absence or prevention of progression during hospitalization  Description: INTERVENTIONS:  - Assess and monitor for signs and symptoms of infection  - Monitor lab/diagnostic results  - Monitor all insertion sites, i.e. indwelling lines, tubes, and drains  - Monitor endotracheal if appropriate and nasal secretions for changes in amount and color  - Sidney appropriate cooling/warming therapies per order  - Administer medications as ordered  - Instruct and encourage patient and family to use good hand hygiene technique  - Identify and instruct in appropriate isolation precautions for identified infection/condition  Outcome: Progressing  Goal: Absence of fever/infection during neutropenic period  Description: INTERVENTIONS:  - Monitor WBC    Outcome: Progressing     Problem: SAFETY ADULT  Goal: Patient will remain free of falls  Description: INTERVENTIONS:  - Educate patient/family on patient safety including physical limitations  - Instruct patient to call for assistance with activity   - Consult OT/PT to assist with strengthening/mobility   - Keep Call bell within reach  - Keep bed low and locked with side rails adjusted as appropriate  - Keep care items and personal belongings within reach  - Initiate and maintain comfort rounds  - Make Fall Risk Sign visible to staff  - Offer Toileting every 3 Hours,  in advance of need  - Initiate/Maintain alarm  - Obtain necessary fall risk management equipment:   - Apply yellow socks and bracelet for high fall risk patients  - Consider moving patient to room near nurses station  Outcome: Progressing  Goal: Maintain or return to baseline ADL function  Description: INTERVENTIONS:  -  Assess patient's ability to carry out ADLs; assess patient's baseline for ADL function and identify physical deficits which impact ability to perform ADLs (bathing, care of mouth/teeth, toileting, grooming, dressing, etc.)  - Assess/evaluate cause of self-care deficits   - Assess range of motion  - Assess patient's mobility; develop plan if impaired  - Assess patient's need for assistive devices and provide as appropriate  - Encourage maximum independence but intervene and supervise when necessary  - Involve family in performance of ADLs  - Assess for home care needs following discharge   - Consider OT consult to assist with ADL evaluation and planning for discharge  - Provide patient education as appropriate  Outcome: Progressing  Goal: Maintains/Returns to pre admission functional level  Description: INTERVENTIONS:  - Perform AM-PAC 6 Click Basic Mobility/ Daily Activity assessment daily.  - Set and communicate daily mobility goal to care team and patient/family/caregiver.   - Collaborate with rehabilitation services on mobility goals if consulted  - Perform Range of Motion 3 times a day.  - Reposition patient every 2 hours.  - Dangle patient 3 times a day  - Stand patient 3 times a day  - Ambulate patient 3 times a day  - Out of bed to chair 3 times a day   - Out of bed for meals 3 times a day  - Out of bed for toileting  - Record patient progress and toleration of activity level   Outcome: Progressing     Problem: DISCHARGE PLANNING  Goal: Discharge to home or other facility with appropriate resources  Description: INTERVENTIONS:  - Identify barriers to discharge w/patient and caregiver  -  Arrange for needed discharge resources and transportation as appropriate  - Identify discharge learning needs (meds, wound care, etc.)  - Arrange for interpretive services to assist at discharge as needed  - Refer to Case Management Department for coordinating discharge planning if the patient needs post-hospital services based on physician/advanced practitioner order or complex needs related to functional status, cognitive ability, or social support system  Outcome: Progressing     Problem: Knowledge Deficit  Goal: Patient/family/caregiver demonstrates understanding of disease process, treatment plan, medications, and discharge instructions  Description: Complete learning assessment and assess knowledge base.  Interventions:  - Provide teaching at level of understanding  - Provide teaching via preferred learning methods  Outcome: Progressing     Problem: RESPIRATORY - ADULT  Goal: Achieves optimal ventilation and oxygenation  Description: INTERVENTIONS:  - Assess for changes in respiratory status  - Assess for changes in mentation and behavior  - Position to facilitate oxygenation and minimize respiratory effort  - Oxygen administered by appropriate delivery if ordered  - Initiate smoking cessation education as indicated  - Encourage broncho-pulmonary hygiene including cough, deep breathe, Incentive Spirometry  - Assess the need for suctioning and aspirate as needed  - Assess and instruct to report SOB or any respiratory difficulty  - Respiratory Therapy support as indicated  Outcome: Progressing     Problem: MUSCULOSKELETAL - ADULT  Goal: Maintain or return mobility to safest level of function  Description: INTERVENTIONS:  - Assess patient's ability to carry out ADLs; assess patient's baseline for ADL function and identify physical deficits which impact ability to perform ADLs (bathing, care of mouth/teeth, toileting, grooming, dressing, etc.)  - Assess/evaluate cause of self-care deficits   - Assess range of  motion  - Assess patient's mobility  - Assess patient's need for assistive devices and provide as appropriate  - Encourage maximum independence but intervene and supervise when necessary  - Involve family in performance of ADLs  - Assess for home care needs following discharge   - Consider OT consult to assist with ADL evaluation and planning for discharge  - Provide patient education as appropriate  Outcome: Progressing  Goal: Maintain proper alignment of affected body part  Description: INTERVENTIONS:  - Support, maintain and protect limb and body alignment  - Provide patient/ family with appropriate education  Outcome: Progressing

## 2024-12-07 NOTE — PROGRESS NOTES
Progress Note - Hospitalist   Name: Tom Stewart 88 y.o. male I MRN: [id number]  Unit/Bed#: 2 E 264-01 I Date of Admission: 12/6/2024   Date of Service: 12/7/2024 I Hospital Day: 1    Assessment & Plan  Acute on chronic heart failure with preserved ejection fraction (HCC)  Wt Readings from Last 3 Encounters:   12/06/24 98.5 kg (217 lb 2.5 oz)   12/02/24 96.6 kg (213 lb)   10/30/24 91.6 kg (201 lb 15.1 oz)     Lab Results   Component Value Date    BNP 2,615 (H) 12/06/2024      Secondary to due to fluid overload.  Patient presents with lower extremity edema and fluid extravasation from the skin.  Significant weight gain compared to last office visit 4 days ago.  Patient supposed to take Lasix 80 mg twice a day.  Patient lives in a facility, per son he is compliant with meds as well as diet.  Follows with cardiology and last visit on 12/2 patient was euvolemic.  Plan:  Given fluid overload despite high-dose of Lasix will transition to Bumex 2 mg twice a day.  Received a dose of IV Lasix 80 mg  Monitor strict I's and O's and daily weight  Cardiac diet with fluid restriction  Cardiology consulted  Echo pending.        Coronary artery disease involving native coronary artery of native heart without angina pectoris  History of ischemic heart disease.  S/p PCI in 2020.  Continue aspirin, metoprolol  Per cardiology notes patient allergic to statin  Paroxysmal atrial fibrillation (HCC)  Patient in A-fib with rate controlled on admission  Patient takes Eliquis 2.5 mg twice a day.  And metoprolol.  Will continue home regiment  Type 2 diabetes mellitus with stage 4 chronic kidney disease, without long-term current use of insulin (HCC)  Lab Results   Component Value Date    HGBA1C 7.1 (H) 06/11/2024       Recent Labs     12/06/24  2110 12/07/24  0730   POCGLU 324* 291*       Blood Sugar Average: Last 72 hrs:  (P) 307.5  History of diabetes patient takes glipizide.  No insulin.  Will start insulin sliding scale.  Monitor for  Accu-Cheks or times a day.  Hypoglycemia protocol and diabetic diet    Stage 4 chronic kidney disease (HCC)  Lab Results   Component Value Date    EGFR 26 12/07/2024    EGFR 26 12/06/2024    EGFR 26 11/18/2024    CREATININE 2.17 (H) 12/07/2024    CREATININE 2.15 (H) 12/06/2024    CREATININE 2.16 (H) 11/18/2024     Creatinine baseline in low to.  Does not appear worsening in kidney function as of admission labs.  Continue monitoring in the setting of IV diuretics.  Avoid nephrotoxic agents and hypoperfusion.  Monitor I's and O's  Primary hypertension  Continue home blood pressure medications  Other Alzheimer's disease (HCC)  Patient with baseline dementia.  On assessment pleasantly confused.  Moderate aortic stenosis  Patient follows with cardiology in the office for routine monitoring.  Acute respiratory failure with hypoxia (HCC)  Patient son reports that patient used to be off of oxygen 1 to lift at home and ever since moved to facility his being on 1 to 2 L.  Currently requiring 1 to 2 L on admission.  Chest x-ray with interstitial edema per my reading pending final radiology reading  Will continue with IV diuretics and hopefully wean patient off oxygen if possible.      VTE Pharmacologic Prophylaxis: VTE Score: 5 Moderate Risk (Score 3-4) - Pharmacological DVT Prophylaxis Ordered: apixaban (Eliquis).    Mobility:   Basic Mobility Inpatient Raw Score: 18  JH-HLM Goal: 6: Walk 10 steps or more  JH-HLM Achieved: 5: Stand (1 or more minutes)  JH-HLM Goal NOT achieved. Continue with multidisciplinary rounding and encourage appropriate mobility to improve upon JH-HLM goals.    Patient Centered Rounds: I performed bedside rounds with nursing staff today.   Discussions with Specialists or Other Care Team Provider: Cardiology notes    Education and Discussions with Family / Patient: Patient declined call to .     Current Length of Stay: 1 day(s)  Current Patient Status: Inpatient   Certification Statement:  The patient will continue to require additional inpatient hospital stay due to ongoing diuresis  Discharge Plan: Anticipate discharge in 24-48 hrs to prior assisted or independent living facility.    Code Status: Level 1 - Full Code    Subjective   Patient lying in bed resting comfortably daughter at bedside no shortness of breath at rest dyspnea with exertion no conversational dyspnea significant lower extremity swelling    Objective :  Temp:  [97.7 °F (36.5 °C)-97.8 °F (36.6 °C)] 97.7 °F (36.5 °C)  HR:  [69-77] 69  BP: (161-174)/(88-96) 170/88  Resp:  [18-19] 18  SpO2:  [96 %-98 %] 97 %  O2 Device: Nasal cannula  Nasal Cannula O2 Flow Rate (L/min):  [1 L/min-2 L/min] 1 L/min    Body mass index is 31.16 kg/m².     Input and Output Summary (last 24 hours):     Intake/Output Summary (Last 24 hours) at 12/7/2024 0909  Last data filed at 12/7/2024 0603  Gross per 24 hour   Intake --   Output 1200 ml   Net -1200 ml       Physical Exam  Vitals and nursing note reviewed.   Constitutional:       General: He is not in acute distress.     Appearance: He is ill-appearing.   Cardiovascular:      Rate and Rhythm: Normal rate.   Pulmonary:      Effort: No respiratory distress.   Musculoskeletal:         General: Normal range of motion.      Cervical back: Normal range of motion.      Right lower leg: Edema present.      Left lower leg: Edema present.   Skin:     General: Skin is warm and dry.   Neurological:      Mental Status: He is alert. Mental status is at baseline.   Psychiatric:         Mood and Affect: Mood normal.     Lines/Drains:      Lab Results: I have reviewed the following results:   Results from last 7 days   Lab Units 12/07/24  0555 12/06/24  1707   WBC Thousand/uL 7.17 9.63   HEMOGLOBIN g/dL 10.1* 10.3*   HEMATOCRIT % 32.7* 33.4*   PLATELETS Thousands/uL 130* 141*   SEGS PCT %  --  94*   LYMPHO PCT %  --  5*   MONO PCT %  --  1*   EOS PCT %  --  0     Results from last 7 days   Lab Units 12/07/24  0555  12/06/24  1803   SODIUM mmol/L 134* 133*   POTASSIUM mmol/L 3.9 4.5   CHLORIDE mmol/L 94* 93*   CO2 mmol/L 34* 34*   BUN mg/dL 58* 61*   CREATININE mg/dL 2.17* 2.15*   ANION GAP mmol/L 6 6   CALCIUM mg/dL 8.8 8.9   ALBUMIN g/dL  --  3.4*   TOTAL BILIRUBIN mg/dL  --  0.83   ALK PHOS U/L  --  40   ALT U/L  --  11   AST U/L  --  10*   GLUCOSE RANDOM mg/dL 336* 352*         Results from last 7 days   Lab Units 12/07/24  0730 12/06/24  2110   POC GLUCOSE mg/dl 291* 324*             Recent Cultures (last 7 days):         Imaging Results Review: No pertinent imaging studies reviewed.  Other Study Results Review: No additional pertinent studies reviewed.    Last 24 Hours Medication List:     Current Facility-Administered Medications:     amLODIPine (NORVASC) tablet 10 mg, Daily    apixaban (ELIQUIS) tablet 2.5 mg, BID    aspirin (ECOTRIN LOW STRENGTH) EC tablet 81 mg, Daily    bumetanide (BUMEX) injection 2 mg, BID    Cholecalciferol (VITAMIN D3) tablet 1,000 Units, Daily    finasteride (PROSCAR) tablet 5 mg, Daily    fish oil capsule 1,000 mg, Daily    gabapentin (NEURONTIN) capsule 100 mg, BID    insulin lispro (HumALOG/ADMELOG) 100 units/mL subcutaneous injection 1-6 Units, TID AC **AND** Fingerstick Glucose (POCT), 4x Daily AC and at bedtime    lisinopril (ZESTRIL) tablet 20 mg, Daily    melatonin tablet 3 mg, HS    metoprolol succinate (TOPROL-XL) 24 hr tablet 50 mg, Daily    Administrative Statements   Today, Patient Was Seen By: JALEEL Healy  I have spent a total time of 35 minutes in caring for this patient on the day of the visit/encounter including Diagnostic results, Risks and benefits of tx options, Risk factor reductions, Counseling / Coordination of care, Documenting in the medical record, Reviewing / ordering tests, medicine, procedures  , and Communicating with other healthcare professionals .    **Please Note: This note may have been constructed using a voice recognition system.**

## 2024-12-07 NOTE — ASSESSMENT & PLAN NOTE
Patient in A-fib with rate controlled on admission  Patient takes Eliquis 2.5 mg twice a day.  And metoprolol.  Will continue home regiment

## 2024-12-07 NOTE — ASSESSMENT & PLAN NOTE
History of ischemic heart disease.  S/p PCI in 2020.  Continue aspirin, metoprolol  Per cardiology notes patient allergic to statines.

## 2024-12-07 NOTE — PLAN OF CARE
Problem: PAIN - ADULT  Goal: Verbalizes/displays adequate comfort level or baseline comfort level  Description: Interventions:  - Encourage patient to monitor pain and request assistance  - Assess pain using appropriate pain scale  - Administer analgesics based on type and severity of pain and evaluate response  - Implement non-pharmacological measures as appropriate and evaluate response  - Consider cultural and social influences on pain and pain management  - Notify physician/advanced practitioner if interventions unsuccessful or patient reports new pain  Outcome: Progressing     Problem: INFECTION - ADULT  Goal: Absence or prevention of progression during hospitalization  Description: INTERVENTIONS:  - Assess and monitor for signs and symptoms of infection  - Monitor lab/diagnostic results  - Monitor all insertion sites, i.e. indwelling lines, tubes, and drains  - Monitor endotracheal if appropriate and nasal secretions for changes in amount and color  - [place] appropriate cooling/warming therapies per order  - Administer medications as ordered  - Instruct and encourage patient and family to use good hand hygiene technique  - Identify and instruct in appropriate isolation precautions for identified infection/condition  Outcome: Progressing  Goal: Absence of fever/infection during neutropenic period  Description: INTERVENTIONS:  - Monitor WBC    Outcome: Progressing     Problem: SAFETY ADULT  Goal: Patient will remain free of falls  Description: INTERVENTIONS:  - Educate patient/family on patient safety including physical limitations  - Instruct patient to call for assistance with activity   - Consult OT/PT to assist with strengthening/mobility   - Keep Call bell within reach  - Keep bed low and locked with side rails adjusted as appropriate  - Keep care items and personal belongings within reach  - Initiate and maintain comfort rounds  - Make Fall Risk Sign visible to staff  - Offer Toileting every 2 Hours,  in advance of need  - Initiate/Maintain bed alarm  - Obtain necessary fall risk management equipment: bed alarm, nonskid socks  - Apply yellow socks and bracelet for high fall risk patients  - Consider moving patient to room near nurses station  Outcome: Progressing  Goal: Maintain or return to baseline ADL function  Description: INTERVENTIONS:  -  Assess patient's ability to carry out ADLs; assess patient's baseline for ADL function and identify physical deficits which impact ability to perform ADLs (bathing, care of mouth/teeth, toileting, grooming, dressing, etc.)  - Assess/evaluate cause of self-care deficits   - Assess range of motion  - Assess patient's mobility; develop plan if impaired  - Assess patient's need for assistive devices and provide as appropriate  - Encourage maximum independence but intervene and supervise when necessary  - Involve family in performance of ADLs  - Assess for home care needs following discharge   - Consider OT consult to assist with ADL evaluation and planning for discharge  - Provide patient education as appropriate  Outcome: Progressing  Goal: Maintains/Returns to pre admission functional level  Description: INTERVENTIONS:  - Perform AM-PAC 6 Click Basic Mobility/ Daily Activity assessment daily.  - Set and communicate daily mobility goal to care team and patient/family/caregiver.   - Collaborate with rehabilitation services on mobility goals if consulted  - Perform Range of Motion 4 times a day.  - Reposition patient every 2 hours.  - Dangle patient 4 times a day  - Stand patient 4 times a day  - Ambulate patient 4 times a day  - Out of bed to chair 4 times a day   - Out of bed for meals 4 times a day  - Out of bed for toileting  - Record patient progress and toleration of activity level   Outcome: Progressing     Problem: DISCHARGE PLANNING  Goal: Discharge to home or other facility with appropriate resources  Description: INTERVENTIONS:  - Identify barriers to discharge  w/patient and caregiver  - Arrange for needed discharge resources and transportation as appropriate  - Identify discharge learning needs (meds, wound care, etc.)  - Arrange for interpretive services to assist at discharge as needed  - Refer to Case Management Department for coordinating discharge planning if the patient needs post-hospital services based on physician/advanced practitioner order or complex needs related to functional status, cognitive ability, or social support system  Outcome: Progressing     Problem: Knowledge Deficit  Goal: Patient/family/caregiver demonstrates understanding of disease process, treatment plan, medications, and discharge instructions  Description: Complete learning assessment and assess knowledge base.  Interventions:  - Provide teaching at level of understanding  - Provide teaching via preferred learning methods  Outcome: Progressing     Problem: RESPIRATORY - ADULT  Goal: Achieves optimal ventilation and oxygenation  Description: INTERVENTIONS:  - Assess for changes in respiratory status  - Assess for changes in mentation and behavior  - Position to facilitate oxygenation and minimize respiratory effort  - Oxygen administered by appropriate delivery if ordered  - Initiate smoking cessation education as indicated  - Encourage broncho-pulmonary hygiene including cough, deep breathe, Incentive Spirometry  - Assess the need for suctioning and aspirate as needed  - Assess and instruct to report SOB or any respiratory difficulty  - Respiratory Therapy support as indicated  Outcome: Progressing     Problem: MUSCULOSKELETAL - ADULT  Goal: Maintain or return mobility to safest level of function  Description: INTERVENTIONS:  - Assess patient's ability to carry out ADLs; assess patient's baseline for ADL function and identify physical deficits which impact ability to perform ADLs (bathing, care of mouth/teeth, toileting, grooming, dressing, etc.)  - Assess/evaluate cause of self-care  deficits   - Assess range of motion  - Assess patient's mobility  - Assess patient's need for assistive devices and provide as appropriate  - Encourage maximum independence but intervene and supervise when necessary  - Involve family in performance of ADLs  - Assess for home care needs following discharge   - Consider OT consult to assist with ADL evaluation and planning for discharge  - Provide patient education as appropriate  Outcome: Progressing  Goal: Maintain proper alignment of affected body part  Description: INTERVENTIONS:  - Support, maintain and protect limb and body alignment  - Provide patient/ family with appropriate education  Outcome: Progressing

## 2024-12-08 ENCOUNTER — APPOINTMENT (INPATIENT)
Dept: NON INVASIVE DIAGNOSTICS | Facility: HOSPITAL | Age: 89
DRG: 291 | End: 2024-12-08
Payer: MEDICARE

## 2024-12-08 LAB
ANION GAP SERPL CALCULATED.3IONS-SCNC: 7 MMOL/L (ref 4–13)
AORTIC ROOT: 3.7 CM
AORTIC VALVE MEAN VELOCITY: 24.5 M/S
APICAL FOUR CHAMBER EJECTION FRACTION: 47 %
ASCENDING AORTA: 3.7 CM
AV AREA BY CONTINUOUS VTI: 0.8 CM2
AV AREA PEAK VELOCITY: 0.8 CM2
AV LVOT MEAN GRADIENT: 2 MMHG
AV LVOT PEAK GRADIENT: 3 MMHG
AV MEAN GRADIENT: 26 MMHG
AV PEAK GRADIENT: 42 MMHG
AV VALVE AREA: 0.8 CM2
AV VELOCITY RATIO: 0.25
BSA FOR ECHO PROCEDURE: 2.14 M2
BUN SERPL-MCNC: 52 MG/DL (ref 5–25)
CALCIUM SERPL-MCNC: 9.1 MG/DL (ref 8.4–10.2)
CHLORIDE SERPL-SCNC: 92 MMOL/L (ref 96–108)
CO2 SERPL-SCNC: 36 MMOL/L (ref 21–32)
CREAT SERPL-MCNC: 1.94 MG/DL (ref 0.6–1.3)
DOP CALC AO PEAK VEL: 3.2 M/S
DOP CALC AO VTI: 69.56 CM
DOP CALC LVOT AREA: 3.14 CM2
DOP CALC LVOT CARDIAC INDEX: 2.12 L/MIN/M2
DOP CALC LVOT CARDIAC OUTPUT: 4.54 L/MIN
DOP CALC LVOT DIAMETER: 2 CM
DOP CALC LVOT PEAK VEL VTI: 17.71 CM
DOP CALC LVOT PEAK VEL: 0.8 M/S
DOP CALC LVOT STROKE INDEX: 26.2 ML/M2
DOP CALC LVOT STROKE VOLUME: 55.61
E WAVE DECELERATION TIME: 204 MS
FRACTIONAL SHORTENING: 18 (ref 28–44)
GFR SERPL CREATININE-BSD FRML MDRD: 30 ML/MIN/1.73SQ M
GLUCOSE SERPL-MCNC: 240 MG/DL (ref 65–140)
GLUCOSE SERPL-MCNC: 240 MG/DL (ref 65–140)
GLUCOSE SERPL-MCNC: 270 MG/DL (ref 65–140)
GLUCOSE SERPL-MCNC: 272 MG/DL (ref 65–140)
GLUCOSE SERPL-MCNC: 302 MG/DL (ref 65–140)
INTERVENTRICULAR SEPTUM IN DIASTOLE (PARASTERNAL SHORT AXIS VIEW): 1.1 CM
INTERVENTRICULAR SEPTUM: 1.1 CM (ref 0.6–1.1)
LAAS-AP2: 26.9 CM2
LAAS-AP4: 21.8 CM2
LEFT ATRIUM SIZE: 5.4 CM
LEFT ATRIUM VOLUME (MOD BIPLANE): 79 ML
LEFT ATRIUM VOLUME INDEX (MOD BIPLANE): 36.9 ML/M2
LEFT INTERNAL DIMENSION IN SYSTOLE: 4.5 CM (ref 2.1–4)
LEFT VENTRICULAR INTERNAL DIMENSION IN DIASTOLE: 5.5 CM (ref 3.5–6)
LEFT VENTRICULAR POSTERIOR WALL IN END DIASTOLE: 1.1 CM
LEFT VENTRICULAR STROKE VOLUME: 54 ML
LVSV (TEICH): 54 ML
MAGNESIUM SERPL-MCNC: 2.1 MG/DL (ref 1.9–2.7)
MV E'TISSUE VEL-SEP: 6 CM/S
MV PEAK A VEL: 0 M/S
MV PEAK E VEL: 119 CM/S
MV STENOSIS PRESSURE HALF TIME: 59 MS
MV VALVE AREA P 1/2 METHOD: 3.73
POTASSIUM SERPL-SCNC: 3.6 MMOL/L (ref 3.5–5.3)
RA PRESSURE ESTIMATED: 15 MMHG
RIGHT ATRIUM AREA SYSTOLE A4C: 25.9 CM2
RIGHT VENTRICLE ID DIMENSION: 6.3 CM
RV PSP: 46 MMHG
SINOTUBULAR JUNCTION: 2.7 CM
SL CV LEFT ATRIUM LENGTH A2C: 6 CM
SL CV LV EF: 35
SL CV PED ECHO LEFT VENTRICLE DIASTOLIC VOLUME (MOD BIPLANE) 2D: 146 ML
SL CV PED ECHO LEFT VENTRICLE SYSTOLIC VOLUME (MOD BIPLANE) 2D: 93 ML
SODIUM SERPL-SCNC: 135 MMOL/L (ref 135–147)
STJ: 2.7 CM
TR MAX PG: 31 MMHG
TR PEAK VELOCITY: 2.8 M/S
TRICUSPID ANNULAR PLANE SYSTOLIC EXCURSION: 1.8 CM
TRICUSPID VALVE PEAK REGURGITATION VELOCITY: 2.79 M/S

## 2024-12-08 PROCEDURE — 82948 REAGENT STRIP/BLOOD GLUCOSE: CPT

## 2024-12-08 PROCEDURE — 80048 BASIC METABOLIC PNL TOTAL CA: CPT | Performed by: NURSE PRACTITIONER

## 2024-12-08 PROCEDURE — 99232 SBSQ HOSP IP/OBS MODERATE 35: CPT | Performed by: INTERNAL MEDICINE

## 2024-12-08 PROCEDURE — 93306 TTE W/DOPPLER COMPLETE: CPT | Performed by: INTERNAL MEDICINE

## 2024-12-08 PROCEDURE — 99232 SBSQ HOSP IP/OBS MODERATE 35: CPT | Performed by: NURSE PRACTITIONER

## 2024-12-08 PROCEDURE — 83735 ASSAY OF MAGNESIUM: CPT | Performed by: NURSE PRACTITIONER

## 2024-12-08 PROCEDURE — C8929 TTE W OR WO FOL WCON,DOPPLER: HCPCS

## 2024-12-08 PROCEDURE — 97163 PT EVAL HIGH COMPLEX 45 MIN: CPT

## 2024-12-08 RX ORDER — NIFEDIPINE 30 MG/1
60 TABLET, EXTENDED RELEASE ORAL DAILY
Status: DISCONTINUED | OUTPATIENT
Start: 2024-12-08 | End: 2024-12-12 | Stop reason: HOSPADM

## 2024-12-08 RX ADMIN — LISINOPRIL 20 MG: 20 TABLET ORAL at 09:31

## 2024-12-08 RX ADMIN — BUMETANIDE 2 MG: 0.25 INJECTION INTRAMUSCULAR; INTRAVENOUS at 09:32

## 2024-12-08 RX ADMIN — FINASTERIDE 5 MG: 5 TABLET, FILM COATED ORAL at 09:32

## 2024-12-08 RX ADMIN — PERFLUTREN 1 ML/MIN: 6.52 INJECTION, SUSPENSION INTRAVENOUS at 11:24

## 2024-12-08 RX ADMIN — APIXABAN 2.5 MG: 2.5 TABLET, FILM COATED ORAL at 09:31

## 2024-12-08 RX ADMIN — BUMETANIDE 2 MG: 0.25 INJECTION INTRAMUSCULAR; INTRAVENOUS at 17:27

## 2024-12-08 RX ADMIN — Medication 1000 UNITS: at 09:31

## 2024-12-08 RX ADMIN — GABAPENTIN 100 MG: 100 CAPSULE ORAL at 17:26

## 2024-12-08 RX ADMIN — METOPROLOL SUCCINATE 50 MG: 50 TABLET, EXTENDED RELEASE ORAL at 09:32

## 2024-12-08 RX ADMIN — APIXABAN 2.5 MG: 2.5 TABLET, FILM COATED ORAL at 17:26

## 2024-12-08 RX ADMIN — NIFEDIPINE 60 MG: 30 TABLET, FILM COATED, EXTENDED RELEASE ORAL at 09:31

## 2024-12-08 RX ADMIN — OMEGA-3 FATTY ACIDS CAP 1000 MG 1000 MG: 1000 CAP at 09:31

## 2024-12-08 RX ADMIN — Medication 3 MG: at 21:25

## 2024-12-08 RX ADMIN — INSULIN LISPRO 4 UNITS: 100 INJECTION, SOLUTION INTRAVENOUS; SUBCUTANEOUS at 12:41

## 2024-12-08 RX ADMIN — INSULIN LISPRO 3 UNITS: 100 INJECTION, SOLUTION INTRAVENOUS; SUBCUTANEOUS at 09:27

## 2024-12-08 RX ADMIN — ASPIRIN 81 MG: 81 TABLET, COATED ORAL at 09:32

## 2024-12-08 RX ADMIN — INSULIN LISPRO 4 UNITS: 100 INJECTION, SOLUTION INTRAVENOUS; SUBCUTANEOUS at 17:24

## 2024-12-08 RX ADMIN — GABAPENTIN 100 MG: 100 CAPSULE ORAL at 09:32

## 2024-12-08 NOTE — PHYSICAL THERAPY NOTE
Physical Therapy Evaluation     Patient's Name: Tom Stewart    Admitting Diagnosis  CHF (congestive heart failure) ([facility]) [I50.9]  Leg swelling [M79.89]    Problem List  Patient Active Problem List   Diagnosis    Mixed hyperlipidemia    Coronary artery disease involving native coronary artery of native heart without angina pectoris    History of CVA    Paroxysmal atrial fibrillation ([facility])    Type 2 diabetes mellitus with stage 4 chronic kidney disease, without long-term current use of insulin ([facility])    Fall    Ambulatory dysfunction    Other proteinuria    Vitamin D deficiency    Gait instability    Hearing loss    Stage 4 chronic kidney disease ([facility])    Primary hypertension    Acute on chronic heart failure with preserved ejection fraction ([facility])    Other Alzheimer's disease ([facility])    Sebaceous cyst    Nose ulceration    Acute respiratory failure with hypoxia ([facility])    Encounter for delirium elderly at risk (DEAR) screening    Frailty syndrome in geriatric patient    Moderate aortic stenosis    CAD s/p PCI to LAD       Past Medical History  Past Medical History:   Diagnosis Date    Acute deep vein thrombosis (DVT) of brachial vein of left upper extremity ([facility]) 11/24/2017    Acute deep vein thrombosis (DVT) of left peroneal vein ([facility])     Acute ST elevation myocardial infarction ([facility])     Aortic valve stenosis     Arthritis     Atrial fibrillation ([facility])     Basilar artery stenosis     Basilar artery stenosis     Basilar artery stenosis 05/04/2020    MRA Head wo contrast (12/13/2019)  IMPRESSION:   Multifocal intracranial atherosclerotic disease with moderate to severe stenosis at the origin of the left M1 segment with additional atherosclerotic disease noted in the distal right M1 and proximal inferior left M2 branches.   Moderate to severe stenosis in the proximal and mid basilar artery with nonvisualization of the right intradural vertebral    Benign prostatic hyperplasia with lower urinary tract symptoms     CAD  (coronary artery disease)     CAD in native artery 11/24/2017    Underwent cardiac catheterization on 1/30/2020 and had mid and distal LAD stent placed  Cardiac PCI (1/30/2020)  SUMMARY   CORONARY CIRCULATION: Mid LAD: There was a 90 % stenosis at the site of a prior stent. Distal LAD: There was a 90 % stenosis at the site of a prior stent. Left posterior descending artery: There was a diffuse 50 % stenosis.   1ST LESION INTERVENTIONS: A balloon angioplasty wit    Cerebrovascular small vessel disease 11/12/2020    Chronic kidney disease     Closed fracture of multiple ribs of left side with routine healing 09/03/2020    Coronary artery disease     Dementia (HCC)     Diabetes mellitus ([facility])     DM2 (diabetes mellitus, type 2) ([facility])     Elevated troponin 07/19/2021    Herpes zoster without complication 07/28/2021    History of CVA (cerebrovascular accident) 02/21/2019    History of DVT of peroneal vein left lower extremity 12/16/2019    History of transfusion     HLD (hyperlipidemia)     HTN (hypertension)     Infected sebaceous cyst of skin 06/08/2021    Lump in chest 06/01/2021    Middle cerebral artery stenosis     Mild to moderate aortic stenosis 10/09/2018    ECHO (7/2020)  AORTIC VALVE: Leaflets exhibited moderately increased thickness and moderate calcification. Transaortic velocity was increased due to valvular stenosis. There was mild to moderate stenosis. RICHY 1.4cm, mean pressure gradient 11mmHg, peak velocity 2.15m/s There was mild regurgitation.    Myocardial infarction ([facility])     Near syncope 07/19/2021    Other proteinuria 10/08/2019    Proteinuria     Rib fractures (right) 07/31/2020    Stroke ([facility])     Symptomatic bradycardia     Transient cerebral ischemia     Vitamin D deficiency        Past Surgical History  Past Surgical History:   Procedure Laterality Date    CARDIAC CATHETERIZATION      Outcome: successful; last assessed: 02/03/2015    CARDIAC CATHETERIZATION  11/26/2019    CORONARY ANGIOPLASTY  WITH STENT PLACEMENT  2008    stent to LAD     CORONARY ARTERY BYPASS GRAFT      HAND SURGERY      thumb    HIP HARDWARE REMOVAL      JOINT REPLACEMENT      TOTAL HIP ARTHROPLASTY Right     TOTAL HIP ARTHROPLASTY Bilateral           12/08/24 1335   PT Last Visit   PT Visit Date 12/08/24   Note Type   Note type Evaluation   Pain Assessment   Pain Assessment Tool 0-10   Pain Score No Pain   Restrictions/Precautions   Weight Bearing Precautions Per Order No   Other Precautions Cognitive;Chair Alarm;Bed Alarm;Fall Risk;Multiple lines;O2;Hard of hearing   Home Living   Type of Home House   Home Layout One level;Able to live on main level with bedroom/bathroom;Performs ADLs on one level;Stairs to enter with rails  (4 DILMA)   Bathroom Shower/Tub Walk-in shower   Bathroom Toilet Standard   Bathroom Equipment Grab bars in shower   Bathroom Accessibility Accessible   Home Equipment Other (Comment)  (none per pt)   Prior Function   Level of [place] Independent with ADLs;Independent with functional mobility;Needs assistance with IADLS   Lives With Significant other   Receives Help From Family;Friend(s)   IADLs Family/Friend/Other provides transportation;Family/Friend/Other provides meals;Family/Friend/Other provides medication management   Falls in the last 6 months 0  (pt denies)   Vocational Retired   Comments pt poor historian, CM to follow up. per EMR review- information listed above.  pt presents here from STR since previous hospitalization   General   Family/Caregiver Present No   Cognition   Overall Cognitive Status Impaired   Arousal/Participation Alert   Orientation Level Oriented to person;Oriented to place;Disoriented to time;Disoriented to situation   Memory Decreased short term memory;Decreased recall of recent events;Decreased recall of precautions   Following Commands Follows one step commands with increased time or repetition   Comments pt agreeable to PT evaluation   RLE Assessment   RLE Assessment    (grossly 3+/5)   LLE Assessment   LLE Assessment   (grossly 3+/5)   Light Touch   RLE Light Touch Grossly intact   LLE Light Touch Grossly intact   Bed Mobility   Supine to Sit 4  Minimal assistance   Additional items Assist x 1;HOB elevated;Bedrails;Increased time required;Verbal cues   Transfers   Sit to Stand 4  Minimal assistance   Additional items Assist x 1;Armrests;Increased time required;Verbal cues   Stand to Sit 4  Minimal assistance   Additional items Assist x 1;Armrests;Increased time required;Verbal cues   Additional Comments pt denying lightheadedness/dizziness   Ambulation/Elevation   Gait pattern Decreased foot clearance;Wide RADHA;Short stride;Step to;Foward flexed   Gait Assistance 4  Minimal assist   Additional items Assist x 1;Verbal cues   Assistive Device Rolling walker   Distance 10'   Balance   Static Sitting Fair +   Dynamic Sitting Fair   Static Standing Fair -   Dynamic Standing Poor +   Ambulatory Poor +   Endurance Deficit   Endurance Deficit Yes   Activity Tolerance   Activity Tolerance Patient tolerated treatment well   Nurse Made Aware JELENA Schuster   Assessment   Prognosis Good   Problem List Decreased strength;Decreased endurance;Impaired balance;Decreased mobility;Impaired sensation;Pain   Assessment Pt is 88 y.o. male seen for PT evaluation on 12/8/2024 s/p admit to [facility] on 12/6/2024 w/ Acute on chronic heart failure with preserved ejection fraction (HCC). PT was consulted to assess pt's functional mobility and d/c needs. Order placed for PT eval and tx. PTA, pt resides in [facility] with [facility], ambulates without AD; currently at STR since previous hospitalization. At time of eval, pt requiring min assist for bedmobility, transfers, short gait trial from EOB to recliner with use of RW. Upon evaluation, pt presenting with impaired functional mobility d/t decreased strength, decreased endurance, impaired balance, decreased mobility, decreased cognition, and activity  intolerance. Pertinent PMHx and current co-morbidities affecting pt's physical performance at time of assessment include: mixed hyperlipidemia due to type 2 DM, CAD, history of CVA, paroxysmal atrial fibrillation, stage 4 CKD, ambulatory dysfunction, primary hypertension, Alzheimer's disease, and acute respiratory failure with hypoxi . Personal factors affecting pt at time of eval include: ambulating w/ assistive device, stairs to enter home, inability to navigate community distances, and decreased cognition. The following objective measures performed on IE also reveal limitations: Barthel Index: 40/100, Modified [place]: 4 (moderate/severe disability), and AM-PAC 6-Clicks: 15/24. Pt's clinical presentation is currently unstable/unpredictable seen in pt's presentation of advanced age, abnormal lab value(s), and ongoing medical assessment. Overall, pt's rehab potential and prognosis to return to PLOF is good as impacted by objective findings, warranting pt to receive further skilled PT interventions to address identified impairments, activity limitation(s), and participation restriction(s). Pt to benefit from continued PT tx to address deficits as defined above and maximize level of functional independent mobility. From PT/mobility standpoint, recommend level 2, moderate resource intensity in order to facilitate return to PLOF.   Barriers to Discharge Inaccessible home environment;Decreased caregiver support   Goals   Patient Goals to go home   STG Expiration Date 12/18/24   Short Term Goal #1 In 7-10 days: Increase bilateral LE strength 1/2 grade to facilitate independent mobility, Perform all bed mobility tasks modified independent to decrease caregiver burden, Perform all transfers modified independent to improve independence, Ambulate > 150 ft. with least restrictive assistive device modified independent w/o LOB and w/ normalized gait pattern 100% of the time, Navigate 4 stair(s) modified independent with  unilateral handrail to facilitate return to previous living environment, and Increase all balance 1/2 grade to decrease risk for falls   PT Treatment Day 0   Plan   Treatment/Interventions Functional transfer training;LE strengthening/ROM;Therapeutic exercise;Endurance training;Patient/family training;Equipment eval/education;Bed mobility;Gait training;Spoke to nursing   PT Frequency 3-5x/wk   Discharge Recommendation   Rehab Resource Intensity Level, PT II (Moderate Resource Intensity)   Equipment Recommended Walker  (RW)   AM-PAC Basic Mobility Inpatient   Turning in Flat Bed Without Bedrails 3   Lying on Back to Sitting on Edge of Flat Bed Without Bedrails 3   Moving Bed to Chair 3   Standing Up From Chair Using Arms 3   Walk in Room 2   Climb 3-5 Stairs With Railing 1   Basic Mobility Inpatient Raw Score 15   Basic Mobility Standardized Score 36.97   [facility] Highest Level Of Mobility   -[facility] Goal 4: Move to chair/commode   -HLM Achieved 6: Walk 10 steps or more   Modified [place] Scale   Modified [place] Scale 4   Barthel Index   Feeding 10   Bathing 0   Grooming Score 0   Dressing Score 5   Bladder Score 0   Bowels Score 10   Toilet Use Score 5   Transfers (Bed/Chair) Score 10   Mobility (Level Surface) Score 0   Stairs Score 0   Barthel Index Score 40         Chetna Lion, PT

## 2024-12-08 NOTE — ASSESSMENT & PLAN NOTE
Lab Results   Component Value Date    HGBA1C 8.5 (H) 12/06/2024     Recent Labs     12/07/24  1137 12/07/24  1654 12/07/24 2052 12/08/24  0735   POCGLU 286* 193* 263* 240*   Blood Sugar Average: Last 72 hrs:  (P) 266.8022498756719645  Management per primary service.

## 2024-12-08 NOTE — PROGRESS NOTES
Progress Note - Cardiology   Name: Tom Stewart 88 y.o. male I MRN: [id number]  Unit/Bed#: 2 E 264-01 I Date of Admission: 12/6/2024   Date of Service: 12/8/2024 I Hospital Day: 2    Assessment & Plan  Acute on chronic heart failure with preserved ejection fraction (HCC)  Wt Readings from Last 3 Encounters:   12/06/24 98.5 kg (217 lb 2.5 oz)   12/02/24 96.6 kg (213 lb)   10/30/24 91.6 kg (201 lb 15.1 oz)   Hypervolemic on exam  CXR shows moderate pulmonary venous congestion with trace pleural effusions.    Continue IV Bumex, metoprolol and lisinopril     Recommend strict I/O's, daily weights, and sodium restriction.  CAD s/p PCI to LAD  S/p  PCI with balloon angioplasty and CONCEPCION to each of the lesions.  Continue ASA and Toprol   Not on statin due to history of allergy (hives).  Paroxysmal atrial fibrillation (HCC)  HR is well-controlled, transition to Toprol-XL 50 mg daily.  [id number] at least 7, continue Eliquis 2.5 mg bid (age, creatinine).  Moderate aortic stenosis  Patient/family previously opted for conservative management given advanced age in setting of multiple comorbidities including dementia.  Primary hypertension  BP is elevated, continue to monitor.    Continue Toprol-XL, lisinopril and discontinue amlodipine  Start nifedipine 60 mg daily   Type 2 diabetes mellitus with stage 4 chronic kidney disease, without long-term current use of insulin ([facility])  Lab Results   Component Value Date    HGBA1C 8.5 (H) 12/06/2024     Recent Labs     12/07/24  1137 12/07/24  1654 12/07/24  2052 12/08/24  0735   POCGLU 286* 193* 263* 240*   Blood Sugar Average: Last 72 hrs:  (P) 266.4278356429263869  Management per primary service.  Stage 4 chronic kidney disease (HCC)  Lab Results   Component Value Date    EGFR 26 12/07/2024    EGFR 26 12/06/2024    EGFR 26 11/18/2024    CREATININE 2.17 (H) 12/07/2024    CREATININE 2.15 (H) 12/06/2024    CREATININE 2.16 (H) 11/18/2024   Management per primary service.  Other Alzheimer's  "disease (HCC)  Management per primary service.    Subjective : LE edema      Objective :  Temp:  [97.5 °F (36.4 °C)-98 °F (36.7 °C)] 98 °F (36.7 °C)  HR:  [56-70] 69  BP: (145-173)/(81-91) 173/85  Resp:  [19] 19  SpO2:  [90 %-98 %] 94 %  O2 Device: Nasal cannula  Orthostatic Blood Pressures      Flowsheet Row Most Recent Value   Blood Pressure 173/85 filed at 12/08/2024 0700   Patient Position - Orthostatic VS Lying filed at 12/08/2024 0700          First Weight: Weight - Scale: 98.5 kg (217 lb 2.5 oz) (12/06/24 2003)  Vitals:    12/06/24 2003   Weight: 98.5 kg (217 lb 2.5 oz)     Physical Exam  Vitals and nursing note reviewed.   Constitutional:       General: He is not in acute distress.     Appearance: He is well-developed.   HENT:      Head: Normocephalic and atraumatic.   Eyes:      Conjunctiva/sclera: Conjunctivae normal.   Cardiovascular:      Rate and Rhythm: Normal rate and regular rhythm.      Heart sounds: No murmur heard.  Pulmonary:      Effort: Pulmonary effort is normal. No respiratory distress.      Breath sounds: Rales present.   Abdominal:      Palpations: Abdomen is soft.      Tenderness: There is no abdominal tenderness.   Musculoskeletal:         General: No swelling.      Cervical back: Neck supple.      Right lower leg: Edema present.      Left lower leg: Edema present.   Skin:     General: Skin is warm and dry.      Capillary Refill: Capillary refill takes less than 2 seconds.   Neurological:      Mental Status: He is alert.   Psychiatric:         Mood and Affect: Mood normal.           Lab Results: I have reviewed the following results:Creatinine Clearance: Estimated Creatinine Clearance: 31.9 mL/min (A) (by C-G formula based on SCr of 1.94 mg/dL (H))., Lipid Profile:   , TSH:   , Blood Culture: No results found for: \"BLOODCX\", Lipid Profile:     Results from last 7 days   Lab Units 12/07/24  0555 12/06/24  1707   WBC Thousand/uL 7.17 9.63   HEMOGLOBIN g/dL 10.1* 10.3*   HEMATOCRIT % 32.7* " 33.4*   PLATELETS Thousands/uL 130* 141*     Results from last 7 days   Lab Units 12/07/24  0555 12/06/24  1803   POTASSIUM mmol/L 3.9 4.5   CHLORIDE mmol/L 94* 93*   CO2 mmol/L 34* 34*   BUN mg/dL 58* 61*   CREATININE mg/dL 2.17* 2.15*   CALCIUM mg/dL 8.8 8.9         Lab Results   Component Value Date    HGBA1C 8.5 (H) 12/06/2024     Lab Results   Component Value Date    TROPONINI 0.02 10/14/2021

## 2024-12-08 NOTE — ASSESSMENT & PLAN NOTE
Wt Readings from Last 3 Encounters:   12/06/24 98.5 kg (217 lb 2.5 oz)   12/02/24 96.6 kg (213 lb)   10/30/24 91.6 kg (201 lb 15.1 oz)   Hypervolemic on exam  CXR shows moderate pulmonary venous congestion with trace pleural effusions.    Continue IV Bumex, metoprolol and lisinopril     Recommend strict I/O's, daily weights, and sodium restriction.

## 2024-12-08 NOTE — ASSESSMENT & PLAN NOTE
S/p  PCI with balloon angioplasty and CONCEPCION to each of the lesions.  Continue ASA and Toprol   Not on statin due to history of allergy (hives).

## 2024-12-08 NOTE — ASSESSMENT & PLAN NOTE
BP is elevated, continue to monitor.    Continue Toprol-XL, lisinopril and discontinue amlodipine  Start nifedipine 60 mg daily

## 2024-12-08 NOTE — PLAN OF CARE
Problem: PAIN - ADULT  Goal: Verbalizes/displays adequate comfort level or baseline comfort level  Description: Interventions:  - Encourage patient to monitor pain and request assistance  - Assess pain using appropriate pain scale  - Administer analgesics based on type and severity of pain and evaluate response  - Implement non-pharmacological measures as appropriate and evaluate response  - Consider cultural and social influences on pain and pain management  - Notify physician/advanced practitioner if interventions unsuccessful or patient reports new pain  Outcome: Progressing     Problem: INFECTION - ADULT  Goal: Absence or prevention of progression during hospitalization  Description: INTERVENTIONS:  - Assess and monitor for signs and symptoms of infection  - Monitor lab/diagnostic results  - Monitor all insertion sites, i.e. indwelling lines, tubes, and drains  - Monitor endotracheal if appropriate and nasal secretions for changes in amount and color  - [place] appropriate cooling/warming therapies per order  - Administer medications as ordered  - Instruct and encourage patient and family to use good hand hygiene technique  - Identify and instruct in appropriate isolation precautions for identified infection/condition  Outcome: Progressing  Goal: Absence of fever/infection during neutropenic period  Description: INTERVENTIONS:  - Monitor WBC    Outcome: Progressing     Problem: SAFETY ADULT  Goal: Patient will remain free of falls  Description: INTERVENTIONS:  - Educate patient/family on patient safety including physical limitations  - Instruct patient to call for assistance with activity   - Consult OT/PT to assist with strengthening/mobility   - Keep Call bell within reach  - Keep bed low and locked with side rails adjusted as appropriate  - Keep care items and personal belongings within reach  - Initiate and maintain comfort rounds  - Make Fall Risk Sign visible to staff  - Offer Toileting every 2 Hours,  in advance of need  - Initiate/Maintain bed alarm  - Obtain necessary fall risk management equipment  - Apply yellow socks and bracelet for high fall risk patients  - Consider moving patient to room near nurses station  Outcome: Progressing  Goal: Maintain or return to baseline ADL function  Description: INTERVENTIONS:  -  Assess patient's ability to carry out ADLs; assess patient's baseline for ADL function and identify physical deficits which impact ability to perform ADLs (bathing, care of mouth/teeth, toileting, grooming, dressing, etc.)  - Assess/evaluate cause of self-care deficits   - Assess range of motion  - Assess patient's mobility; develop plan if impaired  - Assess patient's need for assistive devices and provide as appropriate  - Encourage maximum independence but intervene and supervise when necessary  - Involve family in performance of ADLs  - Assess for home care needs following discharge   - Consider OT consult to assist with ADL evaluation and planning for discharge  - Provide patient education as appropriate  Outcome: Progressing  Goal: Maintains/Returns to pre admission functional level  Description: INTERVENTIONS:  - Perform AM-PAC 6 Click Basic Mobility/ Daily Activity assessment daily.  - Set and communicate daily mobility goal to care team and patient/family/caregiver.   - Collaborate with rehabilitation services on mobility goals if consulted  - Perform Range of Motion 3 times a day.  - Reposition patient every 2 hours.  - Dangle patient 3 times a day  - Stand patient 3 times a day  - Ambulate patient 3 times a day  - Out of bed to chair 3 times a day   - Out of bed for meals 3 times a day  - Out of bed for toileting  - Record patient progress and toleration of activity level   Outcome: Progressing     Problem: DISCHARGE PLANNING  Goal: Discharge to home or other facility with appropriate resources  Description: INTERVENTIONS:  - Identify barriers to discharge w/patient and caregiver  -  Arrange for needed discharge resources and transportation as appropriate  - Identify discharge learning needs (meds, wound care, etc.)  - Arrange for interpretive services to assist at discharge as needed  - Refer to Case Management Department for coordinating discharge planning if the patient needs post-hospital services based on physician/advanced practitioner order or complex needs related to functional status, cognitive ability, or social support system  Outcome: Progressing     Problem: Knowledge Deficit  Goal: Patient/family/caregiver demonstrates understanding of disease process, treatment plan, medications, and discharge instructions  Description: Complete learning assessment and assess knowledge base.  Interventions:  - Provide teaching at level of understanding  - Provide teaching via preferred learning methods  Outcome: Progressing     Problem: RESPIRATORY - ADULT  Goal: Achieves optimal ventilation and oxygenation  Description: INTERVENTIONS:  - Assess for changes in respiratory status  - Assess for changes in mentation and behavior  - Position to facilitate oxygenation and minimize respiratory effort  - Oxygen administered by appropriate delivery if ordered  - Initiate smoking cessation education as indicated  - Encourage broncho-pulmonary hygiene including cough, deep breathe, Incentive Spirometry  - Assess the need for suctioning and aspirate as needed  - Assess and instruct to report SOB or any respiratory difficulty  - Respiratory Therapy support as indicated  Outcome: Progressing     Problem: MUSCULOSKELETAL - ADULT  Goal: Maintain or return mobility to safest level of function  Description: INTERVENTIONS:  - Assess patient's ability to carry out ADLs; assess patient's baseline for ADL function and identify physical deficits which impact ability to perform ADLs (bathing, care of mouth/teeth, toileting, grooming, dressing, etc.)  - Assess/evaluate cause of self-care deficits   - Assess range of  motion  - Assess patient's mobility  - Assess patient's need for assistive devices and provide as appropriate  - Encourage maximum independence but intervene and supervise when necessary  - Involve family in performance of ADLs  - Assess for home care needs following discharge   - Consider OT consult to assist with ADL evaluation and planning for discharge  - Provide patient education as appropriate  Outcome: Progressing  Goal: Maintain proper alignment of affected body part  Description: INTERVENTIONS:  - Support, maintain and protect limb and body alignment  - Provide patient/ family with appropriate education  Outcome: Progressing     Problem: Nutrition/Hydration-ADULT  Goal: Nutrient/Hydration intake appropriate for improving, restoring or maintaining nutritional needs  Description: Monitor and assess patient's nutrition/hydration status for malnutrition. Collaborate with interdisciplinary team and initiate plan and interventions as ordered.  Monitor patient's weight and dietary intake as ordered or per policy. Utilize nutrition screening tool and intervene as necessary. Determine patient's food preferences and provide high-protein, high-caloric foods as appropriate.     INTERVENTIONS:  - Monitor oral intake, urinary output, labs, and treatment plans  - Assess nutrition and hydration status and recommend course of action  - Evaluate amount of meals eaten  - Assist patient with eating if necessary   - Allow adequate time for meals  - Recommend/ encourage appropriate diets, oral nutritional supplements, and vitamin/mineral supplements  - Order, calculate, and assess calorie counts as needed  - Recommend, monitor, and adjust tube feedings and TPN/PPN based on assessed needs  - Assess need for intravenous fluids  - Provide specific nutrition/hydration education as appropriate  - Include patient/family/caregiver in decisions related to nutrition  Outcome: Progressing     Problem: Prexisting or High Potential for  Compromised Skin Integrity  Goal: Skin integrity is maintained or improved  Description: INTERVENTIONS:  - Identify patients at risk for skin breakdown  - Assess and monitor skin integrity  - Assess and monitor nutrition and hydration status  - Monitor labs   - Assess for incontinence   - Turn and reposition patient  - Assist with mobility/ambulation  - Relieve pressure over bony prominences  - Avoid friction and shearing  - Provide appropriate hygiene as needed including keeping skin clean and dry  - Evaluate need for skin moisturizer/barrier cream  - Collaborate with interdisciplinary team   - Patient/family teaching  - Consider wound care consult   Outcome: Progressing

## 2024-12-08 NOTE — ASSESSMENT & PLAN NOTE
HR is well-controlled, transition to Toprol-XL 50 mg daily.  [id number] at least 7, continue Eliquis 2.5 mg bid (age, creatinine).

## 2024-12-08 NOTE — PROGRESS NOTES
Progress Note - Hospitalist   Name: Tom Stewart 88 y.o. male I MRN: [id number]  Unit/Bed#: 2 E 264-01 I Date of Admission: 12/6/2024   Date of Service: 12/8/2024 I Hospital Day: 2    Assessment & Plan  Acute on chronic heart failure with preserved ejection fraction (HCC)  Wt Readings from Last 3 Encounters:   12/06/24 98.5 kg (217 lb 2.5 oz)   12/02/24 96.6 kg (213 lb)   10/30/24 91.6 kg (201 lb 15.1 oz)     Lab Results   Component Value Date    BNP 2,615 (H) 12/06/2024      Secondary to due to fluid overload.  Patient presents with lower extremity edema and fluid extravasation from the skin.  Significant weight gain compared to last office visit 4 days ago.  Patient supposed to take Lasix 80 mg twice a day.  Patient lives in a facility, per son he is compliant with meds as well as diet.  Follows with cardiology and last visit on 12/2 patient was euvolemic.  Plan:  Monitor strict I's and O's and daily weight  Cardiac diet with fluid restriction  Cardiology consulted  Continue IV Bumex 2 mg twice daily  Home Lasix dose is 80 mg twice daily  Echocardiogram pending  Coronary artery disease involving native coronary artery of native heart without angina pectoris  History of ischemic heart disease.  S/p PCI in 2020.  Continue aspirin, metoprolol  Per cardiology notes patient allergic to statin  Paroxysmal atrial fibrillation (HCC)  Patient in A-fib with rate controlled on admission  Patient takes Eliquis 2.5 mg twice a day.  And metoprolol.  Will continue home regiment  Type 2 diabetes mellitus with stage 4 chronic kidney disease, without long-term current use of insulin (HCC)  Lab Results   Component Value Date    HGBA1C 8.5 (H) 12/06/2024       Recent Labs     12/07/24  1137 12/07/24  1654 12/07/24  2052 12/08/24  0735   POCGLU 286* 193* 263* 240*       Blood Sugar Average: Last 72 hrs:  (P) [id number]  History of diabetes patient takes glipizide.  No insulin.  Will start insulin sliding scale.  Monitor  for Accu-Cheks or times a day.  Hypoglycemia protocol and diabetic diet    Stage 4 chronic kidney disease (HCC)  Lab Results   Component Value Date    EGFR 26 12/07/2024    EGFR 26 12/06/2024    EGFR 26 11/18/2024    CREATININE 2.17 (H) 12/07/2024    CREATININE 2.15 (H) 12/06/2024    CREATININE 2.16 (H) 11/18/2024     Baseline is 1.9-2.5; stable  Continue monitoring in the setting of IV diuretics.  Avoid nephrotoxic agents and hypoperfusion.  Monitor I's and O's  Primary hypertension  Continue home blood pressure medications  Other Alzheimer's disease (HCC)  Patient with baseline dementia.  On assessment pleasantly confused.  Moderate aortic stenosis  Patient follows with cardiology in the office for routine monitoring.  Acute respiratory failure with hypoxia ([facility], [facility] within [facility])  Patient son reports that patient used to be off of oxygen 1 to lift at home and ever since moved to facility his being on 1 to 2 L.  Currently requiring 1 to 2 L on admission.  Chest x-ray with interstitial edema per my reading pending final radiology reading  Will continue with IV diuretics and hopefully wean patient off oxygen if possible.    CAD s/p PCI to LAD      VTE Pharmacologic Prophylaxis: VTE Score: 5 High Risk (Score >/= 5) - Pharmacological DVT Prophylaxis Ordered: apixaban (Eliquis). Sequential Compression Devices Ordered.    Mobility:   Basic Mobility Inpatient Raw Score: 18  JH-HLM Goal: 6: Walk 10 steps or more  JH-HLM Achieved: 1: Laying in bed  JH-HLM Goal NOT achieved. Continue with multidisciplinary rounding and encourage appropriate mobility to improve upon JH-HLM goals.    Patient Centered Rounds: I performed bedside rounds with nursing staff today.   Discussions with Specialists or Other Care Team Provider: Reviewed cardiology notes    Education and Discussions with Family / Patient: Nayan with patient and patient's daughter at bedside    Current Length of Stay: 2 day(s)  Current Patient Status: Inpatient   Certification Statement:  The patient will continue to require additional inpatient hospital stay due to continued diuresing  Discharge Plan: Anticipate discharge in 48-72 hrs to discharge location to be determined pending rehab evaluations.  Lives at [facility]    Code Status: Level 3 - DNAR and DNI    Subjective   Denies any chest pain chest tightness shortness of breath difficulty breathing still has lower extremity swelling reports feeling a little better but overall still not well, patient is forgetful    Objective :  Temp:  [97.5 °F (36.4 °C)-98 °F (36.7 °C)] 98 °F (36.7 °C)  HR:  [56-70] 69  BP: (145-173)/(81-91) 173/85  Resp:  [19] 19  SpO2:  [90 %-98 %] 94 %  O2 Device: Nasal cannula    Body mass index is 31.16 kg/m².     Input and Output Summary (last 24 hours):     Intake/Output Summary (Last 24 hours) at 12/8/2024 0913  Last data filed at 12/8/2024 0500  Gross per 24 hour   Intake 560 ml   Output 1900 ml   Net -1340 ml       Physical Exam  Vitals reviewed.   Constitutional:       General: He is not in acute distress.     Appearance: He is obese. He is ill-appearing.   Cardiovascular:      Rate and Rhythm: Normal rate.   Pulmonary:      Effort: No respiratory distress.   Musculoskeletal:         General: No swelling.   Skin:     General: Skin is warm.      Coloration: Skin is pale.   Neurological:      Mental Status: He is alert. Mental status is at baseline.   Psychiatric:         Mood and Affect: Mood normal.         Thought Content: Thought content normal.         Lines/Drains:        Lab Results: I have reviewed the following results:   Results from last 7 days   Lab Units 12/07/24  0555 12/06/24  1707   WBC Thousand/uL 7.17 9.63   HEMOGLOBIN g/dL 10.1* 10.3*   HEMATOCRIT % 32.7* 33.4*   PLATELETS Thousands/uL 130* 141*   SEGS PCT %  --  94*   LYMPHO PCT %  --  5*   MONO PCT %  --  1*   EOS PCT %  --  0     Results from last 7 days   Lab Units 12/07/24  0555 12/06/24  1803   SODIUM mmol/L 134* 133*   POTASSIUM  mmol/L 3.9 4.5   CHLORIDE mmol/L 94* 93*   CO2 mmol/L 34* 34*   BUN mg/dL 58* 61*   CREATININE mg/dL 2.17* 2.15*   ANION GAP mmol/L 6 6   CALCIUM mg/dL 8.8 8.9   ALBUMIN g/dL  --  3.4*   TOTAL BILIRUBIN mg/dL  --  0.83   ALK PHOS U/L  --  40   ALT U/L  --  11   AST U/L  --  10*   GLUCOSE RANDOM mg/dL 336* 352*         Results from last 7 days   Lab Units 12/08/24  0735 12/07/24  2052 12/07/24  1654 12/07/24  1137 12/07/24  0730 12/06/24  2110   POC GLUCOSE mg/dl 240* 263* 193* 286* 291* 324*     Results from last 7 days   Lab Units 12/06/24  2124   HEMOGLOBIN A1C % 8.5*           Recent Cultures (last 7 days):         Imaging Results Review: No pertinent imaging studies reviewed.  Other Study Results Review: No additional pertinent studies reviewed.    Last 24 Hours Medication List:     Current Facility-Administered Medications:     aluminum-magnesium hydroxide-simethicone (MAALOX) oral suspension 30 mL, Q4H PRN    apixaban (ELIQUIS) tablet 2.5 mg, BID    aspirin (ECOTRIN LOW STRENGTH) EC tablet 81 mg, Daily    bumetanide (BUMEX) injection 2 mg, BID    Cholecalciferol (VITAMIN D3) tablet 1,000 Units, Daily    finasteride (PROSCAR) tablet 5 mg, Daily    fish oil capsule 1,000 mg, Daily    gabapentin (NEURONTIN) capsule 100 mg, BID    insulin lispro (HumALOG/ADMELOG) 100 units/mL subcutaneous injection 1-6 Units, TID AC **AND** Fingerstick Glucose (POCT), 4x Daily AC and at bedtime    lisinopril (ZESTRIL) tablet 20 mg, Daily    melatonin tablet 3 mg, HS    metoprolol succinate (TOPROL-XL) 24 hr tablet 50 mg, Daily    NIFEdipine (PROCARDIA XL) 24 hr tablet 60 mg, Daily    polyethylene glycol (MIRALAX) packet 17 g, Daily PRN    Administrative Statements   Today, Patient Was Seen By: JALEEL Healy  I have spent a total time of 35 minutes in caring for this patient on the day of the visit/encounter including Diagnostic results, Patient and family education, Risk factor reductions, Impressions, Counseling /  Coordination of care, Reviewing / ordering tests, medicine, procedures  , and Obtaining or reviewing history  .    **Please Note: This note may have been constructed using a voice recognition system.**

## 2024-12-08 NOTE — PLAN OF CARE
Problem: PHYSICAL THERAPY ADULT  Goal: Performs mobility at highest level of function for planned discharge setting.  See evaluation for individualized goals.  Description: Treatment/Interventions: Functional transfer training, LE strengthening/ROM, Therapeutic exercise, Endurance training, Patient/family training, Equipment eval/education, Bed mobility, Gait training, Spoke to nursing  Equipment Recommended: Walker (RW)       See flowsheet documentation for full assessment, interventions and recommendations.  12/8/2024 1507 by Chetna Lion PT  Note: Prognosis: Good  Problem List: Decreased strength, Decreased endurance, Impaired balance, Decreased mobility, Impaired sensation, Pain  Assessment: Pt is 88 y.o. male seen for PT evaluation on 12/8/2024 s/p admit to [facility] on 12/6/2024 w/ Acute on chronic heart failure with preserved ejection fraction (HCC). PT was consulted to assess pt's functional mobility and d/c needs. Order placed for PT eval and tx. PTA, pt resides in [facility] with 4 DILMA, ambulates without AD; currently at [facility] since previous hospitalization. At time of eval, pt requiring min assist for bedmobility, transfers, short gait trial from EOB to recliner with use of RW. Upon evaluation, pt presenting with impaired functional mobility d/t decreased strength, decreased endurance, impaired balance, decreased mobility, decreased cognition, and activity intolerance. Pertinent PMHx and current co-morbidities affecting pt's physical performance at time of assessment include: mixed hyperlipidemia due to type 2 DM, CAD, history of CVA, paroxysmal atrial fibrillation, stage 4 CKD, ambulatory dysfunction, primary hypertension, Alzheimer's disease, and acute respiratory failure with hypoxi . Personal factors affecting pt at time of eval include: ambulating w/ assistive device, stairs to enter home, inability to navigate community distances, and decreased cognition. The following objective measures performed  on IE also reveal limitations: [place] Index: 40/100, Modified [place]: 4 (moderate/severe disability), and AM-PAC 6-Clicks: 15/24. Pt's clinical presentation is currently unstable/unpredictable seen in pt's presentation of advanced age, abnormal lab value(s), and ongoing medical assessment. Overall, pt's rehab potential and prognosis to return to PLOF is good as impacted by objective findings, warranting pt to receive further skilled PT interventions to address identified impairments, activity limitation(s), and participation restriction(s). Pt to benefit from continued PT tx to address deficits as defined above and maximize level of functional independent mobility. From PT/mobility standpoint, recommend level 2, moderate resource intensity in order to facilitate return to PLOF.  Barriers to Discharge: Inaccessible home environment, Decreased caregiver support     Rehab Resource Intensity Level, PT: II (Moderate Resource Intensity)    See flowsheet documentation for full assessment.     12/8/2024 1507 by Chetna Lion PT  Note: Prognosis: Good  Problem List: Decreased strength, Decreased endurance, Impaired balance, Decreased mobility, Impaired sensation, Pain  Assessment: Pt is 88 y.o. male seen for PT evaluation on 12/8/2024 s/p admit to [facility] on 12/6/2024 w/ Acute on chronic heart failure with preserved ejection fraction (HCC). PT was consulted to assess pt's functional mobility and d/c needs. Order placed for PT eval and tx. PTA, pt resides in [facility] with 4 [facility], ambulates without AD; currently at [facility] since previous hospitalization. At time of eval, pt requiring min assist for bedmobility, transfers, short gait trial from EOB to recliner with use of RW. Upon evaluation, pt presenting with impaired functional mobility d/t decreased strength, decreased endurance, impaired balance, decreased mobility, decreased cognition, and activity intolerance. Pertinent PMHx and current co-morbidities affecting pt's  physical performance at time of assessment include: mixed hyperlipidemia due to type 2 DM, CAD, history of CVA, paroxysmal atrial fibrillation, stage 4 CKD, ambulatory dysfunction, primary hypertension, Alzheimer's disease, and acute respiratory failure with hypoxi . Personal factors affecting pt at time of eval include: ambulating w/ assistive device, stairs to enter home, inability to navigate community distances, and decreased cognition. The following objective measures performed on IE also reveal limitations: Bart[place] Index: 40/100, Modified [place]: 4 (moderate/severe disability), and AM-PAC 6-Clicks: 15/24. Pt's clinical presentation is currently unstable/unpredictable seen in pt's presentation of advanced age, abnormal lab value(s), and ongoing medical assessment. Overall, pt's rehab potential and prognosis to return to PLOF is good as impacted by objective findings, warranting pt to receive further skilled PT interventions to address identified impairments, activity limitation(s), and participation restriction(s). Pt to benefit from continued PT tx to address deficits as defined above and maximize level of functional independent mobility. From PT/mobility standpoint, recommend level 2, moderate resource intensity in order to facilitate return to PLOF.  Barriers to Discharge: Inaccessible home environment, Decreased caregiver support     Rehab Resource Intensity Level, PT: II (Moderate Resource Intensity)    See flowsheet documentation for full assessment.

## 2024-12-08 NOTE — PLAN OF CARE
Problem: PAIN - ADULT  Goal: Verbalizes/displays adequate comfort level or baseline comfort level  Description: Interventions:  - Encourage patient to monitor pain and request assistance  - Assess pain using appropriate pain scale  - Administer analgesics based on type and severity of pain and evaluate response  - Implement non-pharmacological measures as appropriate and evaluate response  - Consider cultural and social influences on pain and pain management  - Notify physician/advanced practitioner if interventions unsuccessful or patient reports new pain  Outcome: Progressing     Problem: INFECTION - ADULT  Goal: Absence or prevention of progression during hospitalization  Description: INTERVENTIONS:  - Assess and monitor for signs and symptoms of infection  - Monitor lab/diagnostic results  - Monitor all insertion sites, i.e. indwelling lines, tubes, and drains  - Monitor endotracheal if appropriate and nasal secretions for changes in amount and color  - [place] appropriate cooling/warming therapies per order  - Administer medications as ordered  - Instruct and encourage patient and family to use good hand hygiene technique  - Identify and instruct in appropriate isolation precautions for identified infection/condition  Outcome: Progressing  Goal: Absence of fever/infection during neutropenic period  Description: INTERVENTIONS:  - Monitor WBC    Outcome: Progressing     Problem: SAFETY ADULT  Goal: Patient will remain free of falls  Description: INTERVENTIONS:  - Educate patient/family on patient safety including physical limitations  - Instruct patient to call for assistance with activity   - Consult OT/PT to assist with strengthening/mobility   - Keep Call bell within reach  - Keep bed low and locked with side rails adjusted as appropriate  - Keep care items and personal belongings within reach  - Initiate and maintain comfort rounds  - Make Fall Risk Sign visible to staff  - Offer Toileting every  Hours,  in advance of need  - Initiate/Maintain alarm  - Obtain necessary fall risk management equipment:   - Apply yellow socks and bracelet for high fall risk patients  - Consider moving patient to room near nurses station  Outcome: Progressing  Goal: Maintain or return to baseline ADL function  Description: INTERVENTIONS:  -  Assess patient's ability to carry out ADLs; assess patient's baseline for ADL function and identify physical deficits which impact ability to perform ADLs (bathing, care of mouth/teeth, toileting, grooming, dressing, etc.)  - Assess/evaluate cause of self-care deficits   - Assess range of motion  - Assess patient's mobility; develop plan if impaired  - Assess patient's need for assistive devices and provide as appropriate  - Encourage maximum independence but intervene and supervise when necessary  - Involve family in performance of ADLs  - Assess for home care needs following discharge   - Consider OT consult to assist with ADL evaluation and planning for discharge  - Provide patient education as appropriate  Outcome: Progressing  Goal: Maintains/Returns to pre admission functional level  Description: INTERVENTIONS:  - Perform AM-PAC 6 Click Basic Mobility/ Daily Activity assessment daily.  - Set and communicate daily mobility goal to care team and patient/family/caregiver.   - Collaborate with rehabilitation services on mobility goals if consulted  - Perform Range of Motion  times a day.  - Reposition patient every  hours.  - Dangle patient  times a day  - Stand patient  times a day  - Ambulate patient  times a day  - Out of bed to chair  times a day   - Out of bed for meals  times a day  - Out of bed for toileting  - Record patient progress and toleration of activity level   Outcome: Progressing     Problem: DISCHARGE PLANNING  Goal: Discharge to home or other facility with appropriate resources  Description: INTERVENTIONS:  - Identify barriers to discharge w/patient and caregiver  - Arrange for  needed discharge resources and transportation as appropriate  - Identify discharge learning needs (meds, wound care, etc.)  - Arrange for interpretive services to assist at discharge as needed  - Refer to Case Management Department for coordinating discharge planning if the patient needs post-hospital services based on physician/advanced practitioner order or complex needs related to functional status, cognitive ability, or social support system  Outcome: Progressing     Problem: Knowledge Deficit  Goal: Patient/family/caregiver demonstrates understanding of disease process, treatment plan, medications, and discharge instructions  Description: Complete learning assessment and assess knowledge base.  Interventions:  - Provide teaching at level of understanding  - Provide teaching via preferred learning methods  Outcome: Progressing     Problem: RESPIRATORY - ADULT  Goal: Achieves optimal ventilation and oxygenation  Description: INTERVENTIONS:  - Assess for changes in respiratory status  - Assess for changes in mentation and behavior  - Position to facilitate oxygenation and minimize respiratory effort  - Oxygen administered by appropriate delivery if ordered  - Initiate smoking cessation education as indicated  - Encourage broncho-pulmonary hygiene including cough, deep breathe, Incentive Spirometry  - Assess the need for suctioning and aspirate as needed  - Assess and instruct to report SOB or any respiratory difficulty  - Respiratory Therapy support as indicated  Outcome: Progressing     Problem: MUSCULOSKELETAL - ADULT  Goal: Maintain or return mobility to safest level of function  Description: INTERVENTIONS:  - Assess patient's ability to carry out ADLs; assess patient's baseline for ADL function and identify physical deficits which impact ability to perform ADLs (bathing, care of mouth/teeth, toileting, grooming, dressing, etc.)  - Assess/evaluate cause of self-care deficits   - Assess range of motion  - Assess  patient's mobility  - Assess patient's need for assistive devices and provide as appropriate  - Encourage maximum independence but intervene and supervise when necessary  - Involve family in performance of ADLs  - Assess for home care needs following discharge   - Consider OT consult to assist with ADL evaluation and planning for discharge  - Provide patient education as appropriate  Outcome: Progressing  Goal: Maintain proper alignment of affected body part  Description: INTERVENTIONS:  - Support, maintain and protect limb and body alignment  - Provide patient/ family with appropriate education  Outcome: Progressing     Problem: Nutrition/Hydration-ADULT  Goal: Nutrient/Hydration intake appropriate for improving, restoring or maintaining nutritional needs  Description: Monitor and assess patient's nutrition/hydration status for malnutrition. Collaborate with interdisciplinary team and initiate plan and interventions as ordered.  Monitor patient's weight and dietary intake as ordered or per policy. Utilize nutrition screening tool and intervene as necessary. Determine patient's food preferences and provide high-protein, high-caloric foods as appropriate.     INTERVENTIONS:  - Monitor oral intake, urinary output, labs, and treatment plans  - Assess nutrition and hydration status and recommend course of action  - Evaluate amount of meals eaten  - Assist patient with eating if necessary   - Allow adequate time for meals  - Recommend/ encourage appropriate diets, oral nutritional supplements, and vitamin/mineral supplements  - Order, calculate, and assess calorie counts as needed  - Recommend, monitor, and adjust tube feedings and TPN/PPN based on assessed needs  - Assess need for intravenous fluids  - Provide specific nutrition/hydration education as appropriate  - Include patient/family/caregiver in decisions related to nutrition  Outcome: Progressing

## 2024-12-09 ENCOUNTER — APPOINTMENT (INPATIENT)
Dept: RADIOLOGY | Facility: HOSPITAL | Age: 89
DRG: 291 | End: 2024-12-09
Payer: MEDICARE

## 2024-12-09 LAB
ANION GAP SERPL CALCULATED.3IONS-SCNC: 6 MMOL/L (ref 4–13)
BUN SERPL-MCNC: 52 MG/DL (ref 5–25)
CALCIUM SERPL-MCNC: 8.8 MG/DL (ref 8.4–10.2)
CHLORIDE SERPL-SCNC: 93 MMOL/L (ref 96–108)
CO2 SERPL-SCNC: 35 MMOL/L (ref 21–32)
CREAT SERPL-MCNC: 1.79 MG/DL (ref 0.6–1.3)
ERYTHROCYTE [DISTWIDTH] IN BLOOD BY AUTOMATED COUNT: 15.3 % (ref 11.6–15.1)
GFR SERPL CREATININE-BSD FRML MDRD: 33 ML/MIN/1.73SQ M
GLUCOSE SERPL-MCNC: 194 MG/DL (ref 65–140)
GLUCOSE SERPL-MCNC: 196 MG/DL (ref 65–140)
GLUCOSE SERPL-MCNC: 241 MG/DL (ref 65–140)
GLUCOSE SERPL-MCNC: 263 MG/DL (ref 65–140)
GLUCOSE SERPL-MCNC: 334 MG/DL (ref 65–140)
HCT VFR BLD AUTO: 33.8 % (ref 36.5–49.3)
HGB BLD-MCNC: 10.7 G/DL (ref 12–17)
MAGNESIUM SERPL-MCNC: 2.1 MG/DL (ref 1.9–2.7)
MCH RBC QN AUTO: 31.2 PG (ref 26.8–34.3)
MCHC RBC AUTO-ENTMCNC: 31.7 G/DL (ref 31.4–37.4)
MCV RBC AUTO: 99 FL (ref 82–98)
PLATELET # BLD AUTO: 121 THOUSANDS/UL (ref 149–390)
PMV BLD AUTO: 11.1 FL (ref 8.9–12.7)
POTASSIUM SERPL-SCNC: 3.8 MMOL/L (ref 3.5–5.3)
RBC # BLD AUTO: 3.43 MILLION/UL (ref 3.88–5.62)
SODIUM SERPL-SCNC: 134 MMOL/L (ref 135–147)
WBC # BLD AUTO: 6.05 THOUSAND/UL (ref 4.31–10.16)

## 2024-12-09 PROCEDURE — 82948 REAGENT STRIP/BLOOD GLUCOSE: CPT

## 2024-12-09 PROCEDURE — 99232 SBSQ HOSP IP/OBS MODERATE 35: CPT | Performed by: INTERNAL MEDICINE

## 2024-12-09 PROCEDURE — 80048 BASIC METABOLIC PNL TOTAL CA: CPT | Performed by: NURSE PRACTITIONER

## 2024-12-09 PROCEDURE — 71046 X-RAY EXAM CHEST 2 VIEWS: CPT

## 2024-12-09 PROCEDURE — 99232 SBSQ HOSP IP/OBS MODERATE 35: CPT | Performed by: NURSE PRACTITIONER

## 2024-12-09 PROCEDURE — 83735 ASSAY OF MAGNESIUM: CPT | Performed by: NURSE PRACTITIONER

## 2024-12-09 PROCEDURE — 85027 COMPLETE CBC AUTOMATED: CPT | Performed by: NURSE PRACTITIONER

## 2024-12-09 RX ORDER — DOCUSATE SODIUM 100 MG/1
100 CAPSULE, LIQUID FILLED ORAL 2 TIMES DAILY
Status: DISCONTINUED | OUTPATIENT
Start: 2024-12-09 | End: 2024-12-12 | Stop reason: HOSPADM

## 2024-12-09 RX ORDER — ECHINACEA PURPUREA EXTRACT 125 MG
2 TABLET ORAL 4 TIMES DAILY PRN
Status: DISCONTINUED | OUTPATIENT
Start: 2024-12-09 | End: 2024-12-12 | Stop reason: HOSPADM

## 2024-12-09 RX ORDER — SENNOSIDES 8.6 MG
2 TABLET ORAL
Status: DISCONTINUED | OUTPATIENT
Start: 2024-12-09 | End: 2024-12-12 | Stop reason: HOSPADM

## 2024-12-09 RX ADMIN — FINASTERIDE 5 MG: 5 TABLET, FILM COATED ORAL at 08:57

## 2024-12-09 RX ADMIN — APIXABAN 2.5 MG: 2.5 TABLET, FILM COATED ORAL at 08:56

## 2024-12-09 RX ADMIN — METOPROLOL SUCCINATE 50 MG: 50 TABLET, EXTENDED RELEASE ORAL at 08:57

## 2024-12-09 RX ADMIN — BUMETANIDE 2 MG: 0.25 INJECTION INTRAMUSCULAR; INTRAVENOUS at 17:49

## 2024-12-09 RX ADMIN — POLYETHYLENE GLYCOL 3350 17 G: 17 POWDER, FOR SOLUTION ORAL at 08:57

## 2024-12-09 RX ADMIN — ASPIRIN 81 MG: 81 TABLET, COATED ORAL at 08:57

## 2024-12-09 RX ADMIN — INSULIN LISPRO 3 UNITS: 100 INJECTION, SOLUTION INTRAVENOUS; SUBCUTANEOUS at 12:42

## 2024-12-09 RX ADMIN — Medication 1000 UNITS: at 08:57

## 2024-12-09 RX ADMIN — BUMETANIDE 2 MG: 0.25 INJECTION INTRAMUSCULAR; INTRAVENOUS at 08:57

## 2024-12-09 RX ADMIN — GABAPENTIN 100 MG: 100 CAPSULE ORAL at 08:57

## 2024-12-09 RX ADMIN — Medication 3 MG: at 21:00

## 2024-12-09 RX ADMIN — NIFEDIPINE 60 MG: 30 TABLET, FILM COATED, EXTENDED RELEASE ORAL at 08:57

## 2024-12-09 RX ADMIN — CHLOROTHIAZIDE SODIUM 500 MG: 500 INJECTION, POWDER, LYOPHILIZED, FOR SOLUTION INTRAVENOUS at 12:44

## 2024-12-09 RX ADMIN — INSULIN LISPRO 5 UNITS: 100 INJECTION, SOLUTION INTRAVENOUS; SUBCUTANEOUS at 16:38

## 2024-12-09 RX ADMIN — GABAPENTIN 100 MG: 100 CAPSULE ORAL at 17:47

## 2024-12-09 RX ADMIN — INSULIN LISPRO 2 UNITS: 100 INJECTION, SOLUTION INTRAVENOUS; SUBCUTANEOUS at 08:54

## 2024-12-09 RX ADMIN — SENNOSIDES 17.2 MG: 8.6 TABLET, FILM COATED ORAL at 21:00

## 2024-12-09 RX ADMIN — OMEGA-3 FATTY ACIDS CAP 1000 MG 1000 MG: 1000 CAP at 08:57

## 2024-12-09 RX ADMIN — LISINOPRIL 20 MG: 20 TABLET ORAL at 08:57

## 2024-12-09 RX ADMIN — APIXABAN 2.5 MG: 2.5 TABLET, FILM COATED ORAL at 17:47

## 2024-12-09 RX ADMIN — DOCUSATE SODIUM 100 MG: 100 CAPSULE, LIQUID FILLED ORAL at 17:47

## 2024-12-09 NOTE — ASSESSMENT & PLAN NOTE
Wt Readings from Last 3 Encounters:   12/08/24 96.6 kg (213 lb)   12/02/24 96.6 kg (213 lb)   10/30/24 91.6 kg (201 lb 15.1 oz)   Hypervolemic on exam  CXR shows moderate pulmonary venous congestion with trace pleural effusions.    Continue IV Bumex, metoprolol and lisinopril     Recommend strict I/O's, daily weights, and sodium restriction.  Will give 1 dose of intravenous Diuril.  And assess response.  Overall conservative management based on advanced age and multiple comorbidities as well as dementia and CKD.

## 2024-12-09 NOTE — ASSESSMENT & PLAN NOTE
Lab Results   Component Value Date    HGBA1C 8.5 (H) 12/06/2024     Recent Labs     12/08/24  1512 12/08/24  2101 12/09/24  0733 12/09/24  1042   POCGLU 302* 272* 194* 241*   Blood Sugar Average: Last 72 hrs:  (P) 261.5958324902193329  Management per primary service.

## 2024-12-09 NOTE — ASSESSMENT & PLAN NOTE
Lab Results   Component Value Date    EGFR 33 12/09/2024    EGFR 30 12/08/2024    EGFR 26 12/07/2024    CREATININE 1.79 (H) 12/09/2024    CREATININE 1.94 (H) 12/08/2024    CREATININE 2.17 (H) 12/07/2024     Baseline is 1.9-2.5; stable  Continue monitoring in the setting of IV diuretics.  Creatinine appears to be responding to IV diuresis  Avoid nephrotoxic agents and hypoperfusion.  Monitor I's and O's

## 2024-12-09 NOTE — PROGRESS NOTES
Progress Note - Hospitalist   Name: Tom Stewart 89 y.o. male I MRN: [id number]  Unit/Bed#: 2 E 264-01 I Date of Admission: 12/6/2024   Date of Service: 12/9/2024 I Hospital Day: 3    Assessment & Plan  Acute on chronic heart failure with preserved ejection fraction (HCC)  Wt Readings from Last 3 Encounters:   12/08/24 96.6 kg (213 lb)   12/02/24 96.6 kg (213 lb)   10/30/24 91.6 kg (201 lb 15.1 oz)     Lab Results   Component Value Date    BNP 2,615 (H) 12/06/2024      Secondary to due to fluid overload.  Patient presents with lower extremity edema and fluid extravasation from the skin.  Significant weight gain compared to last office visit 4 days ago.  Patient supposed to take Lasix 80 mg twice a day.  Patient lives in a facility, per son he is compliant with meds as well as diet.  Follows with cardiology and last visit on 12/2 patient was euvolemic.  Plan:  Monitor strict I's and O's and daily weight  Net neg. 4L. Wt 213 -4lbs   Cardiac diet with fluid restriction  Cardiology consulted  Continue IV Bumex 2 mg twice daily  Home Lasix dose is 80 mg twice daily  Echocardiogram LVEF now 35% with severe diastolic dysfunction.  Moderate to severe aortic stenosis mild to moderate mitral regurgitation.  EF is now reduced from 50% in 2023   Appreciate cardiology recommendations  Coronary artery disease involving native coronary artery of native heart without angina pectoris  History of ischemic heart disease.  S/p PCI in 2020.  Continue aspirin, metoprolol  Per cardiology notes patient allergic to statin  Paroxysmal atrial fibrillation (HCC)  Patient in A-fib with rate controlled on admission  Patient takes Eliquis 2.5 mg twice a day.   Continue metoprolol with hold parameters  Continue home regimen  Type 2 diabetes mellitus with stage 4 chronic kidney disease, without long-term current use of insulin ([facility])  Lab Results   Component Value Date    HGBA1C 8.5 (H) 12/06/2024       Recent Labs     12/08/24  1512  12/08/24  2101 12/09/24  0733 12/09/24  1042   POCGLU 302* 272* 194* 241*       Blood Sugar Average: Last 72 hrs:  (P) [id number]  History of diabetes patient takes glipizide.  No insulin.  Continue insulin sliding scale.  Monitor for Accu-Cheks  Hypoglycemia protocol and diabetic diet    Stage 4 chronic kidney disease (HCC)  Lab Results   Component Value Date    EGFR 33 12/09/2024    EGFR 30 12/08/2024    EGFR 26 12/07/2024    CREATININE 1.79 (H) 12/09/2024    CREATININE 1.94 (H) 12/08/2024    CREATININE 2.17 (H) 12/07/2024     Baseline is 1.9-2.5; stable  Continue monitoring in the setting of IV diuretics.  Creatinine appears to be responding to IV diuresis  Avoid nephrotoxic agents and hypoperfusion.  Monitor I's and O's  Primary hypertension  Continue home blood pressure medications  Routine monitoring  Other Alzheimer's disease ([facility])  Patient with baseline dementia.  On assessment pleasantly confused.  Moderate aortic stenosis  Echocardiogram showing moderate to severe aortic stenosis   Appreciate recommendations.  Acute respiratory failure with hypoxia ([facility])  Patient son reports that patient used to be off of oxygen 1 to lift at home and ever since moved to facility his being on 1 to 2 L.  Currently requiring 1 to 2 L on admission.  Chest x-ray with interstitial edema per my reading pending final radiology reading  continue with IV diuretics and hopefully wean patient off oxygen if possible.  If not patient may require home oxygen evaluation    CAD s/p PCI to LAD      VTE Pharmacologic Prophylaxis: VTE Score: 5 High Risk (Score >/= 5) - Pharmacological DVT Prophylaxis Ordered: apixaban (Eliquis). Sequential Compression Devices Ordered.    Mobility:   Basic Mobility Inpatient Raw Score: 15  -HLM Goal: 4: Move to chair/commode  JH-HLM Achieved: 1: Laying in bed  JH-HLM Goal NOT achieved. Continue with multidisciplinary rounding and encourage appropriate mobility to improve upon JH-HLM  goals.    Patient Centered Rounds: I performed bedside rounds with nursing staff today.   Discussions with Specialists or Other Care Team Provider: await cardiology     Education and Discussions with Family / Patient: Attempted to update  (son) via phone. Unable to contact.    Current Length of Stay: 3 day(s)  Current Patient Status: Inpatient   Certification Statement: The patient will continue to require additional inpatient hospital stay due to CHF exacerbation with further need for IV diuresis and cardiology recommendations  Discharge Plan: Anticipate discharge in 48-72 hrs to long-term care facility    Code Status: Level 3 - DNAR and DNI    Subjective   His only complaints is that he has been having dried bloody drainage from his nose.  1 something to moisturize his nasal passageways as the humidity is not doing anything.  Patient reports he feels slightly better than he did yesterday has no complaints.  Did attempt to call the son once he asked me to call back in 15 minutes attempted a second call back and phone disconnected.    Objective :  Temp:  [97.5 °F (36.4 °C)-98 °F (36.7 °C)] 98 °F (36.7 °C)  HR:  [61-73] 73  BP: (114-155)/(59-85) 129/68  Resp:  [18-19] 19  SpO2:  [95 %-97 %] 97 %  O2 Device: Nasal cannula  Nasal Cannula O2 Flow Rate (L/min):  [2 L/min-3 L/min] 3 L/min    Body mass index is 30.56 kg/m².     Input and Output Summary (last 24 hours):     Intake/Output Summary (Last 24 hours) at 12/9/2024 1141  Last data filed at 12/9/2024 0901  Gross per 24 hour   Intake 480 ml   Output 2225 ml   Net -1745 ml       Physical Exam  Vitals and nursing note reviewed.   Constitutional:       General: He is not in acute distress.     Appearance: He is well-developed.   HENT:      Head: Normocephalic and atraumatic.   Eyes:      Conjunctiva/sclera: Conjunctivae normal.   Cardiovascular:      Rate and Rhythm: Normal rate and regular rhythm.      Heart sounds: No murmur heard.  Pulmonary:       Effort: Pulmonary effort is normal. No respiratory distress.      Breath sounds: Normal breath sounds.   Abdominal:      Palpations: Abdomen is soft.      Tenderness: There is no abdominal tenderness.   Musculoskeletal:         General: No swelling.      Cervical back: Neck supple.      Right lower leg: Edema present.      Left lower leg: Edema present.   Skin:     General: Skin is warm and dry.      Capillary Refill: Capillary refill takes less than 2 seconds.   Neurological:      Mental Status: He is alert. Mental status is at baseline.   Psychiatric:         Mood and Affect: Mood normal.            Lines/Drains:              Lab Results: I have reviewed the following results:   Results from last 7 days   Lab Units 12/09/24  0443 12/07/24  0555 12/06/24  1707   WBC Thousand/uL 6.05   < > 9.63   HEMOGLOBIN g/dL 10.7*   < > 10.3*   HEMATOCRIT % 33.8*   < > 33.4*   PLATELETS Thousands/uL 121*   < > 141*   SEGS PCT %  --   --  94*   LYMPHO PCT %  --   --  5*   MONO PCT %  --   --  1*   EOS PCT %  --   --  0    < > = values in this interval not displayed.     Results from last 7 days   Lab Units 12/09/24  0443 12/07/24  0555 12/06/24  1803   SODIUM mmol/L 134*   < > 133*   POTASSIUM mmol/L 3.8   < > 4.5   CHLORIDE mmol/L 93*   < > 93*   CO2 mmol/L 35*   < > 34*   BUN mg/dL 52*   < > 61*   CREATININE mg/dL 1.79*   < > 2.15*   ANION GAP mmol/L 6   < > 6   CALCIUM mg/dL 8.8   < > 8.9   ALBUMIN g/dL  --   --  3.4*   TOTAL BILIRUBIN mg/dL  --   --  0.83   ALK PHOS U/L  --   --  40   ALT U/L  --   --  11   AST U/L  --   --  10*   GLUCOSE RANDOM mg/dL 196*   < > 352*    < > = values in this interval not displayed.         Results from last 7 days   Lab Units 12/09/24  1042 12/09/24  0733 12/08/24  2101 12/08/24  1512 12/08/24  1124 12/08/24  0735 12/07/24  2052 12/07/24  1654 12/07/24  1137 12/07/24  0730 12/06/24  2110   POC GLUCOSE mg/dl 241* 194* 272* 302* 270* 240* 263* 193* 286* 291* 324*     Results from last 7 days    Lab Units 12/06/24 2124   HEMOGLOBIN A1C % 8.5*           Recent Cultures (last 7 days):         Imaging Results Review: I reviewed radiology reports from this admission including: chest xray.  Other Study Results Review: EKG was reviewed.     Last 24 Hours Medication List:     Current Facility-Administered Medications:     aluminum-magnesium hydroxide-simethicone (MAALOX) oral suspension 30 mL, Q4H PRN    apixaban (ELIQUIS) tablet 2.5 mg, BID    aspirin (ECOTRIN LOW STRENGTH) EC tablet 81 mg, Daily    bumetanide (BUMEX) injection 2 mg, BID    chlorothiazide (DIURIL) 500 mg in sodium chloride 0.9 % 100 mL IV, Once    Cholecalciferol (VITAMIN D3) tablet 1,000 Units, Daily    finasteride (PROSCAR) tablet 5 mg, Daily    fish oil capsule 1,000 mg, Daily    gabapentin (NEURONTIN) capsule 100 mg, BID    insulin lispro (HumALOG/ADMELOG) 100 units/mL subcutaneous injection 1-6 Units, TID AC **AND** Fingerstick Glucose (POCT), 4x Daily AC and at bedtime    lisinopril (ZESTRIL) tablet 20 mg, Daily    melatonin tablet 3 mg, HS    metoprolol succinate (TOPROL-XL) 24 hr tablet 50 mg, Daily    NIFEdipine (PROCARDIA XL) 24 hr tablet 60 mg, Daily    polyethylene glycol (MIRALAX) packet 17 g, Daily PRN    sodium chloride (OCEAN) 0.65 % nasal spray 2 spray, 4x Daily PRN    Administrative Statements   Today, Patient Was Seen By: JALEEL Corona  I have spent a total time of 45 minutes in caring for this patient on the day of the visit/encounter including Patient and family education, Documenting in the medical record, and Reviewing / ordering tests, medicine, procedures  .    **Please Note: This note may have been constructed using a voice recognition system.**

## 2024-12-09 NOTE — ASSESSMENT & PLAN NOTE
HR is well-controlled, transition to Toprol-XL 50 mg daily.  [id number] at least 7, continue Eliquis 2.5 mg bid (age, creatinine).

## 2024-12-09 NOTE — ASSESSMENT & PLAN NOTE
Lab Results   Component Value Date    HGBA1C 8.5 (H) 12/06/2024       Recent Labs     12/08/24  1512 12/08/24  2101 12/09/24  0733 12/09/24  1042   POCGLU 302* 272* 194* 241*       Blood Sugar Average: Last 72 hrs:  (P) [id number]  History of diabetes patient takes glipizide.  No insulin.  Continue insulin sliding scale.  Monitor for Accu-Cheks  Hypoglycemia protocol and diabetic diet

## 2024-12-09 NOTE — CASE MANAGEMENT
Case Management Assessment & Discharge Planning Note    Patient name Tom Stewart  Location 2 [facility] 264/2 E 264-01 MRN [id number]  : 1935 Date 2024       Current Admission Date: 2024  Current Admission Diagnosis:Acute on chronic heart failure with preserved ejection fraction (HCC)   Patient Active Problem List    Diagnosis Date Noted Date Diagnosed    CAD s/p PCI to LAD 2024     Encounter for delirium elderly at risk (DEAR) screening 10/28/2024     Frailty syndrome in geriatric patient 10/28/2024     Moderate aortic stenosis 10/28/2024     Acute respiratory failure with hypoxia (HCC) 10/25/2024     Sebaceous cyst 2024     Nose ulceration 2024     Primary hypertension 2023     Acute on chronic heart failure with preserved ejection fraction (HCC) 2023     Stage 4 chronic kidney disease (HCC) 2023     Gait instability 2022     Hearing loss 2022     Other Alzheimer's disease (HCC) 2022     Other proteinuria 2021     Vitamin D deficiency 2021     Ambulatory dysfunction 2021     Fall 2020     Type 2 diabetes mellitus with stage 4 chronic kidney disease, without long-term current use of insulin (HCC) 2019     Paroxysmal atrial fibrillation (HCC) 2019     History of CVA 2019     Mixed hyperlipidemia 2017     Coronary artery disease involving native coronary artery of native heart without angina pectoris 2017       LOS (days): 3  Geometric Mean LOS (GMLOS) (days):   Days to GMLOS:     OBJECTIVE:    Risk of Unplanned Readmission Score: 19.11         Current admission status: Inpatient       Preferred Pharmacy:   [id number] - [place] - [address]  [address]  [facility] [place] [address]  Phone: [phone] Fax: [phone]    [facility] [address]  [address]  [address] [address]  Phone: [phone]  Fax: [phone]    Primary Care Provider: Beto Garnett DO    Primary Insurance: MEDICARE  Secondary Insurance: [employer]    ASSESSMENT:  Active Health Care Proxies       Chanel Crockett Health Care Representative - Daughter   Primary Phone: [phone] (Mobile)                 Advance Directives  Does patient have a Health Care POA?: Yes  Does patient have Advance Directives?: Yes  Advance Directives: Living will, Power of  for health care  Primary Contact: maria fernanda and Chanel are both POA's         Readmission Root Cause  30 Day Readmission: No    Patient Information  Admitted from:: Facility  Mental Status: Confused, Alert  During Assessment patient was accompanied by: Son, Daughter  Assessment information provided by:: Son, Daughter  Primary Caregiver: Other (Comment)  Caregiver's Name:: PVM  Caregiver's Relationship to Patient:: Family Member  Caregiver's Telephone Number:: [phone]  Support Systems: Son, Daughter  County of Residence: [place]  What city do you live in?: [place]  Home entry access options. Select all that apply.: No steps to enter home  Type of Current Residence: Facility  Upon entering residence, is there a bedroom on the main floor (no further steps)?: Yes  Upon entering residence, is there a bathroom on the main floor (no further steps)?: Yes  Living Arrangements: Lives in Facility  Is patient a ?: No    Activities of Daily Living Prior to Admission  Functional Status: Total dependent  Completes ADLs independently?: Yes  Level of ADL dependence: Total Dependent  Ambulates independently?: No  Level of ambulatory dependence: Total Dependent  Does patient use assisted devices?: Yes  Assisted Devices (DME) used: Home Oxygen concentrator  DME Company Name (respiratory supplies): [employer]  O2 Rate(s): 2L  Does patient currently own DME?: Yes  What DME does the patient currently own?: Wheelchair  Does patient have a history of Outpatient Therapy (PT/OT)?: Yes  Does the  patient have a history of Short-Term Rehab?: Yes  Does patient have a history of HHC?: Yes  Does patient currently have HHC?: No         Patient Information Continued  Income Source: Unemployed  Does patient have prescription coverage?: Yes  Does patient receive dialysis treatments?: No  Does patient have a history of substance abuse?: No  Does patient have a history of Mental Health Diagnosis?: No         Means of Transportation  Means of Transport to Appts:: Other (Comment) (BLS)          DISCHARGE DETAILS:    Discharge planning discussed with:: patient at bedside and patient's son and daughter over the phone  Freedom of Choice: Yes  Comments - Freedom of Choice: CM maintained freedom of choice as it pertains to discharge planning. Patient's son and daughter both report plan for patient to return to [facility] at discharge  CM contacted family/caregiver?: Yes  Were Treatment Team discharge recommendations reviewed with patient/caregiver?: Yes  Did patient/caregiver verbalize understanding of patient care needs?: N/A- going to facility  Were patient/caregiver advised of the risks associated with not following Treatment Team discharge recommendations?: Yes    Contacts  Patient Contacts: bonifacio  Relationship to Patient:: Family  Contact Method: Phone  Phone Number: [phone]  Reason/Outcome: Continuity of Care, Emergency Contact, Discharge Planning    Requested Home Health Care         Is the patient interested in HHC at discharge?: No    DME Referral Provided  Referral made for DME?: No    Other Referral/Resources/Interventions Provided:  Interventions: Short Term Rehab  Referral Comments: PVM reserved in [facility] for [facility]. they confirm pt is LTC resident    Would you like to participate in our [facility] service program?  : No - Declined    Treatment Team Recommendation: Facility Return  Discharge Destination Plan:: Facility Return  Transport at Discharge : BLS Ambulance    IMM Given (Date)::  12/09/24  IMM Given to:: Family

## 2024-12-09 NOTE — ASSESSMENT & PLAN NOTE
Wt Readings from Last 3 Encounters:   12/08/24 96.6 kg (213 lb)   12/02/24 96.6 kg (213 lb)   10/30/24 91.6 kg (201 lb 15.1 oz)     Lab Results   Component Value Date    BNP 2,615 (H) 12/06/2024      Secondary to due to fluid overload.  Patient presents with lower extremity edema and fluid extravasation from the skin.  Significant weight gain compared to last office visit 4 days ago.  Patient supposed to take Lasix 80 mg twice a day.  Patient lives in a facility, per son he is compliant with meds as well as diet.  Follows with cardiology and last visit on 12/2 patient was euvolemic.  Plan:  Monitor strict I's and O's and daily weight  Net neg. 4L. Wt 213 -4lbs   Cardiac diet with fluid restriction  Cardiology consulted  Continue IV Bumex 2 mg twice daily  Home Lasix dose is 80 mg twice daily  Echocardiogram LVEF now 35% with severe diastolic dysfunction.  Moderate to severe aortic stenosis mild to moderate mitral regurgitation.  EF is now reduced from 50% in 2023   Appreciate cardiology recommendations

## 2024-12-09 NOTE — ASSESSMENT & PLAN NOTE
Patient son reports that patient used to be off of oxygen 1 to lift at home and ever since moved to facility his being on 1 to 2 L.  Currently requiring 1 to 2 L on admission.  Chest x-ray with interstitial edema per my reading pending final radiology reading  continue with IV diuretics and hopefully wean patient off oxygen if possible.  If not patient may require home oxygen evaluation

## 2024-12-09 NOTE — PROGRESS NOTES
Progress Note - Cardiology   Name: Tom Stewart 89 y.o. male I MRN: [id number]  Unit/Bed#: 2 E 264-01 I Date of Admission: 12/6/2024   Date of Service: 12/9/2024 I Hospital Day: 3     Assessment & Plan  Acute on chronic heart failure with preserved ejection fraction (HCC)  Wt Readings from Last 3 Encounters:   12/08/24 96.6 kg (213 lb)   12/02/24 96.6 kg (213 lb)   10/30/24 91.6 kg (201 lb 15.1 oz)   Hypervolemic on exam  CXR shows moderate pulmonary venous congestion with trace pleural effusions.    Continue IV Bumex, metoprolol and lisinopril     Recommend strict I/O's, daily weights, and sodium restriction.  Will give 1 dose of intravenous Diuril.  And assess response.  Overall conservative management based on advanced age and multiple comorbidities as well as dementia and CKD.  CAD s/p PCI to LAD  S/p  PCI with balloon angioplasty and CONCEPCION to each of the lesions.  Continue ASA and Toprol   Not on statin due to history of allergy (hives).  Paroxysmal atrial fibrillation (HCC)  HR is well-controlled, transition to Toprol-XL 50 mg daily.  [id number] at least 7, continue Eliquis 2.5 mg bid (age, creatinine).  Moderate aortic stenosis  Patient/family previously opted for conservative management given advanced age in setting of multiple comorbidities including dementia.  Primary hypertension  BP is elevated, continue to monitor.    Continue Toprol-XL, lisinopril and discontinue amlodipine  Start nifedipine 60 mg daily   Type 2 diabetes mellitus with stage 4 chronic kidney disease, without long-term current use of insulin ([facility])  Lab Results   Component Value Date    HGBA1C 8.5 (H) 12/06/2024     Recent Labs     12/08/24  1512 12/08/24  2101 12/09/24  0733 12/09/24  1042   POCGLU 302* 272* 194* 241*   Blood Sugar Average: Last 72 hrs:  (P) [id number]  Management per primary service.  Stage 4 chronic kidney disease (HCC)  Lab Results   Component Value Date    EGFR 33 12/09/2024    EGFR 30 12/08/2024    EGFR  26 2024    CREATININE 1.79 (H) 2024    CREATININE 1.94 (H) 2024    CREATININE 2.17 (H) 2024   Management per primary service.  Depending on the renal functions and response to diuretics, to consider nephrology evaluation as indicated.  Other Alzheimer's disease (HCC)  Management per primary service.  General Cardiology   Progress Note   Tom Stewart 89 y.o. male MRN: [id number]  Unit/Bed#: 2  264-01 Encounter: [id number]      SUBJECTIVE:   No significant events overnight.  Patient is a poor historian secondary to dementia.  Patient still has some shortness of breath and lower extremity edema.    REVIEW OF SYSTEMS:  Constitutional:  Denies fever or chills   Eyes:  Denies change in visual acuity   HENT:  Denies nasal congestion or sore throat   Respiratory:   shortness of breath   Cardiovascular:  edema   GI:  Denies abdominal pain, nausea, vomiting, bloody stools or diarrhea   :  Denies dysuria, frequency, difficulty in micturition and nocturia  Musculoskeletal:  Denies back pain or joint pain   Neurologic: Dementia  Endocrine:  Denies polyuria or polydipsia   Lymphatic:  Denies swollen glands   Psychiatric:  Denies depression or anxiety     OBJECTIVE:   Vitals:  Vitals:    24 1521   BP: 121/67   Pulse: 64   Resp:    Temp: 97.8 °F (36.6 °C)   SpO2: 97%     Body mass index is 30.56 kg/m².  Systolic (24hrs), Av , Min:114 , Max:155     Diastolic (24hrs), Av, Min:66, Max:85      Intake/Output Summary (Last 24 hours) at 2024 1532  Last data filed at 2024 1521  Gross per 24 hour   Intake 720 ml   Output 1975 ml   Net -1255 ml     Weight (last 2 days)       Date/Time Weight    24 1030 96.6 (213)    24 0929 96.7 (213.2)                PHYSICAL EXAMS:  General:  Patient is not in acute distress, laying in the bed comfortably, awake, alert responding to commands.  Head: Normocephalic, Atraumatic.  HEENT:  Both pupils normal-size atraumatic, normocephalic,  nonicteric  Neck:  JVP not raised. Trachea central  Respiratory: Decreased breath sounds bilateral  Cardiovascular: Irregularly irregular with 3/6 systolic ejection murmur in the right sternal border.  GI:  Abdomen soft nontender. Liver and spleen normal size  Lymphatic:  No cervical or inguinal lymphadenopathy  Neurologic:  Patient is awake alert, responding to command, well-oriented to time and place and person moving   Extremities 2+ edema.    LABORATORY RESULTS:        CBC with diff:   Results from last 7 days   Lab Units 12/09/24 0443 12/07/24  0555 12/06/24  1707   WBC Thousand/uL 6.05 7.17 9.63   HEMOGLOBIN g/dL 10.7* 10.1* 10.3*   HEMATOCRIT % 33.8* 32.7* 33.4*   MCV fL 99* 99* 98   PLATELETS Thousands/uL 121* 130* 141*   RBC Million/uL 3.43* 3.29* 3.40*   MCH pg 31.2 30.7 30.3   MCHC g/dL 31.7 30.9* 30.8*   RDW % 15.3* 15.4* 15.2*   MPV fL 11.1 11.2 11.3   NRBC AUTO /100 WBCs  --   --  0       CMP:  Results from last 7 days   Lab Units 12/09/24 0443 12/08/24  1011 12/07/24  0555 12/06/24  1803   POTASSIUM mmol/L 3.8 3.6 3.9 4.5   CHLORIDE mmol/L 93* 92* 94* 93*   CO2 mmol/L 35* 36* 34* 34*   BUN mg/dL 52* 52* 58* 61*   CREATININE mg/dL 1.79* 1.94* 2.17* 2.15*   CALCIUM mg/dL 8.8 9.1 8.8 8.9   AST U/L  --   --   --  10*   ALT U/L  --   --   --  11   ALK PHOS U/L  --   --   --  40   EGFR ml/min/1.73sq m 33 30 26 26       BMP:  Results from last 7 days   Lab Units 12/09/24 0443 12/08/24  1011 12/07/24  0555   POTASSIUM mmol/L 3.8 3.6 3.9   CHLORIDE mmol/L 93* 92* 94*   CO2 mmol/L 35* 36* 34*   BUN mg/dL 52* 52* 58*   CREATININE mg/dL 1.79* 1.94* 2.17*   CALCIUM mg/dL 8.8 9.1 8.8            Results from last 7 days   Lab Units 12/09/24  0443 12/08/24  1011   MAGNESIUM mg/dL 2.1 2.1     Results from last 7 days   Lab Units 12/06/24  2124   HEMOGLOBIN A1C % 8.5*               Lipid Profile:   Lab Results   Component Value Date    CHOL 190 12/01/2015    CHOL 210 07/14/2015    CHOL 198 09/11/2014     Lab Results    Component Value Date    HDL 46 2024    HDL 44 2024    HDL 57 01/10/2023     Lab Results   Component Value Date    LDLCALC 125 (H) 2024    LDLCALC 131 (H) 2024    LDLCALC 139 (H) 01/10/2023     Lab Results   Component Value Date    TRIG 138 2024    TRIG 135 2024    TRIG 94 01/10/2023       Cardiac testing:  Results for orders placed during the hospital encounter of 21    Echo complete with contrast if indicated    [facility]  [address] [place] [address]  [phone]    Transthoracic Echocardiogram  2D, M-mode, Doppler, and Color Doppler    Study date:  26-May-2021    Patient: NUZHAT BARBER  MR number: [id number]  Account number: [id number]  : 1935  Age: 85 years  Gender: Male  Status: Outpatient  [facility]: [facility]  Height: 69 in  Weight: 221 lb  BP: 122/ 86 mmHg    Indications: Aortic Stenosis.    Diagnoses: I35.0 - Nonrheumatic aortic (valve) stenosis    Sonographer:  RIP Hunt  Primary Physician:  Beto Garnett DO  Referring Physician:  Katlin Sweeney MD  Group:  [facility]  Interpreting Physician:  Arabella Kenny MD    SUMMARY    LEFT VENTRICLE:  Systolic function was normal. Ejection fraction was estimated to be 55 %.  Although no diagnostic regional wall motion abnormality was identified, this possibility cannot be completely excluded on the basis of this study.  Wall thickness was mildly increased.  There was mild concentric hypertrophy.    RIGHT VENTRICLE:  The size was normal.  Systolic function was normal.    LEFT ATRIUM:  The atrium was mildly dilated.    RIGHT ATRIUM:  The atrium was mildly dilated.    MITRAL VALVE:  There was moderate annular calcification.  There was mild regurgitation.    AORTIC VALVE:  The valve was trileaflet. Leaflets exhibited moderately increased thickness, moderate calcification, and mildly reduced cuspal separation.  Transaortic  velocity and gradient were increased due to stenosis as well as concomitant increased transaortic flow.  There was mild stenosis.  There was mild regurgitation.  Valve peak gradient was 30 mmHg.  Valve mean gradient was 19 mmHg.  Aortic valve area was 1.8 cmï¾² by the continuity equation.    TRICUSPID VALVE:  There was mild regurgitation.  Pulmonary artery systolic pressure was within the normal range.    AORTA:  The root exhibited dilatation - 4.0 cm.    HISTORY: PRIOR HISTORY: CAD, CVA, Diabetes Mellitus, Atrial Fibrillation, CKD III, DVT, TIA, Stent.    PROCEDURE: The study was performed in the [facility]. This was a routine study. The transthoracic approach was used. The study included complete 2D imaging, M-mode, complete spectral Doppler, and color Doppler. The  heart rate was 68 bpm, at the start of the study. Images were obtained from the parasternal, apical, subcostal, and suprasternal notch acoustic windows. Echocardiographic views were limited due to chest wall deformity and decreased  penetration. This was a technically difficult study.    LEFT VENTRICLE: Size was normal. Systolic function was normal. Ejection fraction was estimated to be 55 %. Although no diagnostic regional wall motion abnormality was identified, this possibility cannot be completely excluded on the basis  of this study. Wall thickness was mildly increased. There was mild concentric hypertrophy. No evidence of apical thrombus.    RIGHT VENTRICLE: The size was normal. Systolic function was normal. Wall thickness was normal.    LEFT ATRIUM: The atrium was mildly dilated.    RIGHT ATRIUM: The atrium was mildly dilated.    MITRAL VALVE: There was moderate annular calcification. There was normal leaflet separation. DOPPLER: The transmitral velocity was within the normal range. There was no evidence for stenosis. There was mild regurgitation.    AORTIC VALVE: The valve was trileaflet. Leaflets exhibited moderately  increased thickness, moderate calcification, and mildly reduced cuspal separation. DOPPLER: Transaortic velocity and gradient were increased due to stenosis as well as  concomitant increased transaortic flow. There was mild stenosis. There was mild regurgitation.    TRICUSPID VALVE: The valve structure was normal. There was normal leaflet separation. DOPPLER: The transtricuspid velocity was within the normal range. There was no evidence for stenosis. There was mild regurgitation. Pulmonary artery  systolic pressure was within the normal range.    PULMONIC VALVE: Leaflets exhibited normal thickness, no calcification, and normal cuspal separation. DOPPLER: The transpulmonic velocity was within the normal range. There was no significant regurgitation.    PERICARDIUM: There was no pericardial effusion. The pericardium was normal in appearance.    AORTA: The root exhibited dilatation - 4.0 cm.    SYSTEMIC VEINS: IVC: The inferior vena cava was normal in size. Respirophasic changes were normal.    MEASUREMENT TABLES    DOPPLER MEASUREMENTS  Aortic valve   (Reference normals)  Peak gradient   30 mmHg   (--)  Mean gradient   19 mmHg   (--)  Valve area, cont   1.8 cmï¾²   (--)    SYSTEM MEASUREMENT TABLES    2D  %FS: 29.82 %  Ao Diam: 3.98 cm  EDV(Teich): 142.11 ml  EF(Teich): 56.44 %  ESV(Teich): 61.9 ml  IVSd: 1.29 cm  LA Area: 24.41 cm2  LA Diam: 5.07 cm  LVEDV MOD A4C: 147.44 ml  LVEF MOD A4C: 59.76 %  LVESV MOD A4C: 59.32 ml  LVIDd: 5.41 cm  LVIDs: 3.8 cm  LVLd A4C: 8.48 cm  LVLs A4C: 7.15 cm  LVOT Diam: 2.34 cm  LVPWd: 1.19 cm  RA Area: 23.34 cm2  RVIDd: 4.46 cm  SV MOD A4C: 88.12 ml  SV(Teich): 80.2 ml    CW  AV Env.Ti: 302.95 ms  AV MaxP.17 mmHg  AV VTI: 54.96 cm  AV Vmax: 2.4 m/s  AV Vmean: 1.81 m/s  AV meanP.48 mmHg  TR MaxP.57 mmHg  TR Vmax: 2.72 m/s    MM  TAPSE: 2.23 cm    PW  RICHY (VTI): 1.45 cm2  RICHY Vmax: 1.41 cm2  LVOT Env.Ti: 340.63 ms  LVOT VTI: 18.57 cm  LVOT Vmax: 0.79 m/s  LVOT Vmean: 0.55  "m/s  LVOT maxP.52 mmHg  LVOT meanP.35 mmHg  LVSV Dopp: 79.59 ml    [facility] Accredited Echocardiography Laboratory    Prepared and electronically signed by    Arabella Kenny MD  Signed 27-May-2021 08:36:11    No results found for this or any previous visit.    No results found for this or any previous visit.    No valid procedures specified.  No results found for this or any previous visit.      Meds/Allergies   all current active meds have been reviewed    Medications Prior to Admission:     acetaminophen (TYLENOL) 500 mg tablet    ALPRAZolam (NIRAVAM) 0.25 MG dissolvable tablet    amLODIPine (NORVASC) 10 mg tablet    apixaban (ELIQUIS) 2.5 mg    aspirin (ECOTRIN LOW STRENGTH) 81 mg EC tablet    Blood Glucose Monitoring Suppl (ONE TOUCH ULTRA MINI) w/Device KIT    cholecalciferol (VITAMIN D3) 1,000 units tablet    finasteride (PROSCAR) 5 mg tablet    furosemide (LASIX) 20 mg tablet    gabapentin (NEURONTIN) 100 mg capsule    glipiZIDE (GLUCOTROL XL) 2.5 mg 24 hr tablet    glucose blood (OneTouch Ultra) test strip    isosorbide dinitrate (ISORDIL) 10 mg tablet    lisinopril (ZESTRIL) 20 mg tablet    metoprolol tartrate (LOPRESSOR) 25 mg tablet    Omega-3 Fatty Acids (FISH OIL CONCENTRATE PO)    vitamin B-12 (CYANOCOBALAMIN) 100 MCG tablet           Katlin Sweeney MD  2024,3:33 PM    Portions of the record may have been created with voice recognition software.  Occasional wrong word or \"sound a like\" substitutions may have occurred due to the inherent limitations of voice recognition software.  Read the chart carefully and recognize, using context, where substitutions have occurred.   "

## 2024-12-09 NOTE — ASSESSMENT & PLAN NOTE
Patient in A-fib with rate controlled on admission  Patient takes Eliquis 2.5 mg twice a day.   Continue metoprolol with hold parameters  Continue home regimen

## 2024-12-09 NOTE — PLAN OF CARE
Problem: PAIN - ADULT  Goal: Verbalizes/displays adequate comfort level or baseline comfort level  Description: Interventions:  - Encourage patient to monitor pain and request assistance  - Assess pain using appropriate pain scale  - Administer analgesics based on type and severity of pain and evaluate response  - Implement non-pharmacological measures as appropriate and evaluate response  - Consider cultural and social influences on pain and pain management  - Notify physician/advanced practitioner if interventions unsuccessful or patient reports new pain  Outcome: Progressing     Problem: INFECTION - ADULT  Goal: Absence or prevention of progression during hospitalization  Description: INTERVENTIONS:  - Assess and monitor for signs and symptoms of infection  - Monitor lab/diagnostic results  - Monitor all insertion sites, i.e. indwelling lines, tubes, and drains  - Monitor endotracheal if appropriate and nasal secretions for changes in amount and color  - [place] appropriate cooling/warming therapies per order  - Administer medications as ordered  - Instruct and encourage patient and family to use good hand hygiene technique  - Identify and instruct in appropriate isolation precautions for identified infection/condition  Outcome: Progressing     Problem: SAFETY ADULT  Goal: Patient will remain free of falls  Description: INTERVENTIONS:  - Educate patient/family on patient safety including physical limitations  - Instruct patient to call for assistance with activity   - Consult OT/PT to assist with strengthening/mobility   - Keep Call bell within reach  - Keep bed low and locked with side rails adjusted as appropriate  - Keep care items and personal belongings within reach  - Initiate and maintain comfort rounds  - Make Fall Risk Sign visible to staff  - Offer Toileting every 2 Hours, in advance of need  - Initiate/Maintain bed/chair alarm  - Obtain necessary fall risk management equipment: call bell within  reach  - Apply yellow socks and bracelet for high fall risk patients  - Consider moving patient to room near nurses station  Outcome: Progressing     Problem: DISCHARGE PLANNING  Goal: Discharge to home or other facility with appropriate resources  Description: INTERVENTIONS:  - Identify barriers to discharge w/patient and caregiver  - Arrange for needed discharge resources and transportation as appropriate  - Identify discharge learning needs (meds, wound care, etc.)  - Arrange for interpretive services to assist at discharge as needed  - Refer to Case Management Department for coordinating discharge planning if the patient needs post-hospital services based on physician/advanced practitioner order or complex needs related to functional status, cognitive ability, or social support system  Outcome: Progressing     Problem: Knowledge Deficit  Goal: Patient/family/caregiver demonstrates understanding of disease process, treatment plan, medications, and discharge instructions  Description: Complete learning assessment and assess knowledge base.  Interventions:  - Provide teaching at level of understanding  - Provide teaching via preferred learning methods  Outcome: Progressing     Problem: RESPIRATORY - ADULT  Goal: Achieves optimal ventilation and oxygenation  Description: INTERVENTIONS:  - Assess for changes in respiratory status  - Assess for changes in mentation and behavior  - Position to facilitate oxygenation and minimize respiratory effort  - Oxygen administered by appropriate delivery if ordered  - Initiate smoking cessation education as indicated  - Encourage broncho-pulmonary hygiene including cough, deep breathe, Incentive Spirometry  - Assess the need for suctioning and aspirate as needed  - Assess and instruct to report SOB or any respiratory difficulty  - Respiratory Therapy support as indicated  Outcome: Progressing     Problem: Prexisting or High Potential for Compromised Skin Integrity  Goal: Skin  integrity is maintained or improved  Description: INTERVENTIONS:  - Identify patients at risk for skin breakdown  - Assess and monitor skin integrity  - Assess and monitor nutrition and hydration status  - Monitor labs   - Assess for incontinence   - Turn and reposition patient  - Assist with mobility/ambulation  - Relieve pressure over bony prominences  - Avoid friction and shearing  - Provide appropriate hygiene as needed including keeping skin clean and dry  - Evaluate need for skin moisturizer/barrier cream  - Collaborate with interdisciplinary team   - Patient/family teaching  - Consider wound care consult   Outcome: Progressing

## 2024-12-09 NOTE — PLAN OF CARE
Problem: PAIN - ADULT  Goal: Verbalizes/displays adequate comfort level or baseline comfort level  Description: Interventions:  - Encourage patient to monitor pain and request assistance  - Assess pain using appropriate pain scale  - Administer analgesics based on type and severity of pain and evaluate response  - Implement non-pharmacological measures as appropriate and evaluate response  - Consider cultural and social influences on pain and pain management  - Notify physician/advanced practitioner if interventions unsuccessful or patient reports new pain  Outcome: Progressing     Problem: INFECTION - ADULT  Goal: Absence or prevention of progression during hospitalization  Description: INTERVENTIONS:  - Assess and monitor for signs and symptoms of infection  - Monitor lab/diagnostic results  - Monitor all insertion sites, i.e. indwelling lines, tubes, and drains  - Monitor endotracheal if appropriate and nasal secretions for changes in amount and color  - [place] appropriate cooling/warming therapies per order  - Administer medications as ordered  - Instruct and encourage patient and family to use good hand hygiene technique  - Identify and instruct in appropriate isolation precautions for identified infection/condition  Outcome: Progressing  Goal: Absence of fever/infection during neutropenic period  Description: INTERVENTIONS:  - Monitor WBC    Outcome: Progressing     Problem: SAFETY ADULT  Goal: Patient will remain free of falls  Description: INTERVENTIONS:  - Educate patient/family on patient safety including physical limitations  - Instruct patient to call for assistance with activity   - Consult OT/PT to assist with strengthening/mobility   - Keep Call bell within reach  - Keep bed low and locked with side rails adjusted as appropriate  - Keep care items and personal belongings within reach  - Initiate and maintain comfort rounds  - Make Fall Risk Sign visible to staff  - Offer Toileting every  Hours,  in advance of need  - Initiate/Maintain alarm  - Obtain necessary fall risk management equipment:   - Apply yellow socks and bracelet for high fall risk patients  - Consider moving patient to room near nurses station  Outcome: Progressing  Goal: Maintain or return to baseline ADL function  Description: INTERVENTIONS:  -  Assess patient's ability to carry out ADLs; assess patient's baseline for ADL function and identify physical deficits which impact ability to perform ADLs (bathing, care of mouth/teeth, toileting, grooming, dressing, etc.)  - Assess/evaluate cause of self-care deficits   - Assess range of motion  - Assess patient's mobility; develop plan if impaired  - Assess patient's need for assistive devices and provide as appropriate  - Encourage maximum independence but intervene and supervise when necessary  - Involve family in performance of ADLs  - Assess for home care needs following discharge   - Consider OT consult to assist with ADL evaluation and planning for discharge  - Provide patient education as appropriate  Outcome: Progressing  Goal: Maintains/Returns to pre admission functional level  Description: INTERVENTIONS:  - Perform AM-PAC 6 Click Basic Mobility/ Daily Activity assessment daily.  - Set and communicate daily mobility goal to care team and patient/family/caregiver.   - Collaborate with rehabilitation services on mobility goals if consulted  - Perform Range of Motion  times a day.  - Reposition patient every  hours.  - Dangle patient  times a day  - Stand patient  times a day  - Ambulate patient  times a day  - Out of bed to chair  times a day   - Out of bed for meals times a day  - Out of bed for toileting  - Record patient progress and toleration of activity level   Outcome: Progressing     Problem: DISCHARGE PLANNING  Goal: Discharge to home or other facility with appropriate resources  Description: INTERVENTIONS:  - Identify barriers to discharge w/patient and caregiver  - Arrange for  needed discharge resources and transportation as appropriate  - Identify discharge learning needs (meds, wound care, etc.)  - Arrange for interpretive services to assist at discharge as needed  - Refer to Case Management Department for coordinating discharge planning if the patient needs post-hospital services based on physician/advanced practitioner order or complex needs related to functional status, cognitive ability, or social support system  Outcome: Progressing     Problem: Knowledge Deficit  Goal: Patient/family/caregiver demonstrates understanding of disease process, treatment plan, medications, and discharge instructions  Description: Complete learning assessment and assess knowledge base.  Interventions:  - Provide teaching at level of understanding  - Provide teaching via preferred learning methods  Outcome: Progressing     Problem: RESPIRATORY - ADULT  Goal: Achieves optimal ventilation and oxygenation  Description: INTERVENTIONS:  - Assess for changes in respiratory status  - Assess for changes in mentation and behavior  - Position to facilitate oxygenation and minimize respiratory effort  - Oxygen administered by appropriate delivery if ordered  - Initiate smoking cessation education as indicated  - Encourage broncho-pulmonary hygiene including cough, deep breathe, Incentive Spirometry  - Assess the need for suctioning and aspirate as needed  - Assess and instruct to report SOB or any respiratory difficulty  - Respiratory Therapy support as indicated  Outcome: Progressing     Problem: MUSCULOSKELETAL - ADULT  Goal: Maintain or return mobility to safest level of function  Description: INTERVENTIONS:  - Assess patient's ability to carry out ADLs; assess patient's baseline for ADL function and identify physical deficits which impact ability to perform ADLs (bathing, care of mouth/teeth, toileting, grooming, dressing, etc.)  - Assess/evaluate cause of self-care deficits   - Assess range of motion  - Assess  patient's mobility  - Assess patient's need for assistive devices and provide as appropriate  - Encourage maximum independence but intervene and supervise when necessary  - Involve family in performance of ADLs  - Assess for home care needs following discharge   - Consider OT consult to assist with ADL evaluation and planning for discharge  - Provide patient education as appropriate  Outcome: Progressing  Goal: Maintain proper alignment of affected body part  Description: INTERVENTIONS:  - Support, maintain and protect limb and body alignment  - Provide patient/ family with appropriate education  Outcome: Progressing     Problem: Nutrition/Hydration-ADULT  Goal: Nutrient/Hydration intake appropriate for improving, restoring or maintaining nutritional needs  Description: Monitor and assess patient's nutrition/hydration status for malnutrition. Collaborate with interdisciplinary team and initiate plan and interventions as ordered.  Monitor patient's weight and dietary intake as ordered or per policy. Utilize nutrition screening tool and intervene as necessary. Determine patient's food preferences and provide high-protein, high-caloric foods as appropriate.     INTERVENTIONS:  - Monitor oral intake, urinary output, labs, and treatment plans  - Assess nutrition and hydration status and recommend course of action  - Evaluate amount of meals eaten  - Assist patient with eating if necessary   - Allow adequate time for meals  - Recommend/ encourage appropriate diets, oral nutritional supplements, and vitamin/mineral supplements  - Order, calculate, and assess calorie counts as needed  - Recommend, monitor, and adjust tube feedings and TPN/PPN based on assessed needs  - Assess need for intravenous fluids  - Provide specific nutrition/hydration education as appropriate  - Include patient/family/caregiver in decisions related to nutrition  Outcome: Progressing     Problem: Prexisting or High Potential for Compromised Skin  Integrity  Goal: Skin integrity is maintained or improved  Description: INTERVENTIONS:  - Identify patients at risk for skin breakdown  - Assess and monitor skin integrity  - Assess and monitor nutrition and hydration status  - Monitor labs   - Assess for incontinence   - Turn and reposition patient  - Assist with mobility/ambulation  - Relieve pressure over bony prominences  - Avoid friction and shearing  - Provide appropriate hygiene as needed including keeping skin clean and dry  - Evaluate need for skin moisturizer/barrier cream  - Collaborate with interdisciplinary team   - Patient/family teaching  - Consider wound care consult   Outcome: Progressing

## 2024-12-09 NOTE — ASSESSMENT & PLAN NOTE
Lab Results   Component Value Date    EGFR 33 12/09/2024    EGFR 30 12/08/2024    EGFR 26 12/07/2024    CREATININE 1.79 (H) 12/09/2024    CREATININE 1.94 (H) 12/08/2024    CREATININE 2.17 (H) 12/07/2024   Management per primary service.  Depending on the renal functions and response to diuretics, to consider nephrology evaluation as indicated.

## 2024-12-09 NOTE — ED PROVIDER NOTES
Time reflects when diagnosis was documented in both MDM as applicable and the Disposition within this note       Time User Action Codes Description Comment    12/6/2024  6:56 PM Dipak Black Add [I50.9] CHF (congestive heart failure) ([facility])           ED Disposition       ED Disposition   Admit    Condition   Stable    Date/Time   Fri Dec 6, 2024  6:56 PM    Comment   Case was discussed with DUDLEY and the patient's admission status was agreed to be Admission Status: inpatient status to the service of Dr. James .               Assessment & Plan       Medical Decision Making  89-year-old male with acute exacerbation of CHF, will likely require IV diuresis and admission will check labs EKG chest x-ray BNP troponin, continue to monitor and reassess.    Amount and/or Complexity of Data Reviewed  Labs: ordered.    Risk  OTC drugs.  Prescription drug management.  Decision regarding hospitalization.             Medications   aspirin chewable tablet 324 mg (324 mg Oral Given 12/6/24 1756)   furosemide (LASIX) injection 80 mg (80 mg Intravenous Given 12/6/24 1919)       ED Risk Strat Scores                           SBIRT 20yo+      Flowsheet Row Most Recent Value   Initial Alcohol Screen: US AUDIT-C     2. How many drinks containing alcohol do you have on a typical day you are drinking?  0 Filed at: 12/06/2024 1656   3b. FEMALE Any Age, or MALE 65+: How often do you have 4 or more drinks on one occassion? 0 Filed at: 12/06/2024 1656   Audit-C Score 0 Filed at: 12/06/2024 1656   MAVERICK: How many times in the past year have you...    Used an illegal drug or used a prescription medication for non-medical reasons? Never Filed at: 12/06/2024 1656                            History of Present Illness       Chief Complaint   Patient presents with    Leg Swelling     Pt c/o bilateral leg swelling x 3 days, pt denies any pain        Past Medical History:   Diagnosis Date    Acute deep vein thrombosis (DVT) of brachial vein of left upper  extremity (HCC) 11/24/2017    Acute deep vein thrombosis (DVT) of left peroneal vein (HCC)     Acute ST elevation myocardial infarction (HCC)     Aortic valve stenosis     Arthritis     Atrial fibrillation (HCC)     Basilar artery stenosis     Basilar artery stenosis     Basilar artery stenosis 05/04/2020    MRA Head wo contrast (12/13/2019)  IMPRESSION:   Multifocal intracranial atherosclerotic disease with moderate to severe stenosis at the origin of the left M1 segment with additional atherosclerotic disease noted in the distal right M1 and proximal inferior left M2 branches.   Moderate to severe stenosis in the proximal and mid basilar artery with nonvisualization of the right intradural vertebral    Benign prostatic hyperplasia with lower urinary tract symptoms     CAD (coronary artery disease)     CAD in native artery 11/24/2017    Underwent cardiac catheterization on 1/30/2020 and had mid and distal LAD stent placed  Cardiac PCI (1/30/2020)  SUMMARY   CORONARY CIRCULATION: Mid LAD: There was a 90 % stenosis at the site of a prior stent. Distal LAD: There was a 90 % stenosis at the site of a prior stent. Left posterior descending artery: There was a diffuse 50 % stenosis.   1ST LESION INTERVENTIONS: A balloon angioplasty wit    Cerebrovascular small vessel disease 11/12/2020    Chronic kidney disease     Closed fracture of multiple ribs of left side with routine healing 09/03/2020    Coronary artery disease     Dementia (HCC)     Diabetes mellitus ([facility])     DM2 (diabetes mellitus, type 2) ([facility])     Elevated troponin 07/19/2021    Herpes zoster without complication 07/28/2021    History of CVA (cerebrovascular accident) 02/21/2019    History of DVT of peroneal vein left lower extremity 12/16/2019    History of transfusion     HLD (hyperlipidemia)     HTN (hypertension)     Infected sebaceous cyst of skin 06/08/2021    Lump in chest 06/01/2021    Middle cerebral artery stenosis     Mild to moderate aortic  stenosis 10/09/2018    ECHO (7/2020)  AORTIC VALVE: Leaflets exhibited moderately increased thickness and moderate calcification. Transaortic velocity was increased due to valvular stenosis. There was mild to moderate stenosis. RICHY 1.4cm, mean pressure gradient 11mmHg, peak velocity 2.15m/s There was mild regurgitation.    Myocardial infarction (HCC)     Near syncope 07/19/2021    Other proteinuria 10/08/2019    Proteinuria     Rib fractures (right) 07/31/2020    Stroke (HCC)     Symptomatic bradycardia     Transient cerebral ischemia     Vitamin D deficiency       Past Surgical History:   Procedure Laterality Date    CARDIAC CATHETERIZATION      Outcome: successful; last assessed: 02/03/2015    CARDIAC CATHETERIZATION  11/26/2019    CORONARY ANGIOPLASTY WITH STENT PLACEMENT  2008    stent to LAD     CORONARY ARTERY BYPASS GRAFT      HAND SURGERY      thumb    HIP HARDWARE REMOVAL      JOINT REPLACEMENT      TOTAL HIP ARTHROPLASTY Right     TOTAL HIP ARTHROPLASTY Bilateral       Family History   Problem Relation Age of Onset    Emphysema Father     Emphysema Sister       Social History     Tobacco Use    Smoking status: Never     Passive exposure: Never    Smokeless tobacco: Never   Vaping Use    Vaping status: Never Used   Substance Use Topics    Alcohol use: Never    Drug use: Never      E-Cigarette/Vaping    E-Cigarette Use Never User       E-Cigarette/Vaping Substances    Nicotine No     THC No     CBD No     Flavoring No     Other No     Unknown No       I have reviewed and agree with the history as documented.     89-year-old male presented emergency department for evaluation of leg swelling.  Patient's had bilateral leg swelling for the past 3 days, associated with dyspnea on exertion/lightheadedness.  He has history of CHF, does take Lasix but it has not been as effective.  Leg swelling got to the point where there was some weeping wounds and so son was concerned and brought him in.  Has had no fevers or  chills.  He has had no redness to the legs.        Review of Systems   Constitutional:  Negative for appetite change, chills, fatigue and fever.   HENT:  Negative for sneezing and sore throat.    Eyes:  Negative for visual disturbance.   Respiratory:  Positive for shortness of breath. Negative for cough, choking, chest tightness and wheezing.    Cardiovascular:  Positive for leg swelling. Negative for chest pain and palpitations.   Gastrointestinal:  Negative for abdominal pain, constipation, diarrhea, nausea and vomiting.   Genitourinary:  Negative for difficulty urinating and dysuria.   Neurological:  Positive for dizziness. Negative for weakness, light-headedness, numbness and headaches.   All other systems reviewed and are negative.          Objective       ED Triage Vitals   Temperature Pulse Blood Pressure Respirations SpO2 Patient Position - Orthostatic VS   12/06/24 1655 12/06/24 1655 12/06/24 1655 12/06/24 1655 12/06/24 1655 12/06/24 1655   97.8 °F (36.6 °C) 70 164/92 19 96 % Sitting      Temp Source Heart Rate Source BP Location FiO2 (%) Pain Score    12/06/24 1655 12/06/24 1655 12/06/24 1655 -- 12/06/24 1920    Oral Monitor Right arm  No Pain      Vitals      Date and Time Temp Pulse SpO2 Resp BP Pain Score FACES Pain Rating User   12/08/24 2300 -- -- -- 18 -- No Pain -- DC   12/08/24 2300 97.5 °F (36.4 °C) 67 97 % -- 141/76 -- -- SA   12/08/24 1930 -- -- -- -- -- No Pain -- DC   12/08/24 1730 -- 61 97 % -- 114/66 -- -- JA   12/08/24 1442 -- 61 95 % 18 125/59 -- -- NB   12/08/24 1357 -- 72 97 % 18 130/67 No Pain -- JA   12/08/24 1335 -- -- -- -- -- No Pain -- KF   12/08/24 1300 -- -- -- -- -- No Pain -- JA   12/08/24 1030 -- 80 -- -- 173/85 -- -- SZ   12/08/24 0700 98 °F (36.7 °C) 69 94 % 19 173/85 -- -- NB   12/07/24 2300 -- -- -- -- -- No Pain -- ZM   12/07/24 2300 -- 65 94 % -- 152/91 -- -- SA   12/07/24 2300 97.6 °F (36.4 °C) -- -- -- -- -- -- DII   12/07/24 2149 97.5 °F (36.4 °C) 56 98 % -- 146/84 --  -- GP   12/07/24 1430 97.7 °F (36.5 °C) 70 90 % 19 145/81 -- -- TP   12/07/24 0855 -- -- -- -- -- No Pain -- IJ   12/07/24 0700 97.7 °F (36.5 °C) 69 97 % 18 170/88 -- -- TP   12/06/24 2126 -- -- 97 % -- 161/94 -- -- PD   12/06/24 2010 -- -- 97 % -- -- No Pain -- PD   12/06/24 2003 97.7 °F (36.5 °C) 69 97 % 18 162/88 -- -- PD   12/06/24 1920 -- 77 98 % 18 174/96 No Pain -- [place]   12/06/24 1655 97.8 °F (36.6 °C) 70 96 % 19 164/92 -- -- AC            Physical Exam    Results Reviewed       Procedure Component Value Units Date/Time    HS Troponin I 4hr [[id number]]  (Abnormal) Collected: 12/06/24 2124    Lab Status: Final result Specimen: Blood from Arm, Left Updated: 12/06/24 2154     hs TnI 4hr 104 ng/L      Delta 4hr hsTnI -7 ng/L     HS Troponin I 2hr [[id number]]  (Abnormal) Collected: 12/06/24 1919    Lab Status: Final result Specimen: Blood from Arm, Right Updated: 12/06/24 1950     hs TnI 2hr 112 ng/L      Delta 2hr hsTnI 1 ng/L     Comprehensive metabolic panel [[id number]]  (Abnormal) Collected: 12/06/24 1803    Lab Status: Final result Specimen: Blood from Arm, Right Updated: 12/06/24 1837     Sodium 133 mmol/L      Potassium 4.5 mmol/L      Chloride 93 mmol/L      CO2 34 mmol/L      ANION GAP 6 mmol/L      BUN 61 mg/dL      Creatinine 2.15 mg/dL      Glucose 352 mg/dL      Calcium 8.9 mg/dL      Corrected Calcium 9.4 mg/dL      AST 10 U/L      ALT 11 U/L      Alkaline Phosphatase 40 U/L      Total Protein 6.1 g/dL      Albumin 3.4 g/dL      Total Bilirubin 0.83 mg/dL      eGFR 26 ml/min/1.73sq m     Narrative:      [employer] guidelines for Chronic Kidney Disease (CKD):     Stage 1 with normal or high GFR (GFR > 90 mL/min/1.73 square meters)    Stage 2 Mild CKD (GFR = 60-89 mL/min/1.73 square meters)    Stage 3A Moderate CKD (GFR = 45-59 mL/min/1.73 square meters)    Stage 3B Moderate CKD (GFR = 30-44 mL/min/1.73 square meters)    Stage 4 Severe CKD (GFR = 15-29 mL/min/1.73 square  meters)    Stage 5 End Stage CKD (GFR <15 mL/min/1.73 square meters)  Note: GFR calculation is accurate only with a steady state creatinine    CBC and differential [[id number]]  (Abnormal) Collected: 12/06/24 1707    Lab Status: Final result Specimen: Blood from Arm, Right Updated: 12/06/24 1737     WBC 9.63 Thousand/uL      RBC 3.40 Million/uL      Hemoglobin 10.3 g/dL      Hematocrit 33.4 %      MCV 98 fL      MCH 30.3 pg      MCHC 30.8 g/dL      RDW 15.2 %      MPV 11.3 fL      Platelets 141 Thousands/uL      nRBC 0 /100 WBCs      Segmented % 94 %      Immature Grans % 0 %      Lymphocytes % 5 %      Monocytes % 1 %      Eosinophils Relative 0 %      Basophils Relative 0 %      Absolute Neutrophils 9.01 Thousands/µL      Absolute Immature Grans 0.04 Thousand/uL      Absolute Lymphocytes 0.43 Thousands/µL      Absolute Monocytes 0.13 Thousand/µL      Eosinophils Absolute 0.01 Thousand/µL      Basophils Absolute 0.01 Thousands/µL     Narrative:      This is an appended report.  These results have been appended to a previously verified report.    HS Troponin 0hr (reflex protocol) [[id number]]  (Abnormal) Collected: 12/06/24 1707    Lab Status: Final result Specimen: Blood from Arm, Right Updated: 12/06/24 1735     hs TnI 0hr 111 ng/L     B-Type Natriuretic Peptide(BNP) [[id number]]  (Abnormal) Collected: 12/06/24 1707    Lab Status: Final result Specimen: Blood from Arm, Right Updated: 12/06/24 1735     BNP 2,615 pg/mL             XR chest 1 view portable   Final Interpretation by Tamela Lin MD (12/07 0550)      Moderate pulmonary venous congestion with trace pleural effusions.            Workstation performed: [id number]             Procedures    ED Medication and Procedure Management   Prior to Admission Medications   Prescriptions Last Dose Informant Patient Reported? Taking?   ALPRAZolam (NIRAVAM) 0.25 MG dissolvable tablet  Child, Self Yes No   Sig: Take 0.25 mg by mouth daily at bedtime as needed for  anxiety   Blood Glucose Monitoring Suppl (ONE TOUCH ULTRA MINI) w/Device KIT  Child, Self No No   Sig: Use daily   Omega-3 Fatty Acids (FISH OIL CONCENTRATE PO)  Child, Self Yes No   Sig: Take 1 tablet by mouth daily   acetaminophen (TYLENOL) 500 mg tablet  Child, Self Yes No   Sig: Take 500 mg by mouth every 6 (six) hours as needed for mild pain   amLODIPine (NORVASC) 10 mg tablet  Child, Self No No   Sig: TAKE 1 TABLET BY MOUTH DAILY   apixaban (ELIQUIS) 2.5 mg  Child, Self No No   Sig: Take 1 tablet (2.5 mg total) by mouth 2 (two) times a day   aspirin (ECOTRIN LOW STRENGTH) 81 mg EC tablet  Child, Self No No   Sig: Take 1 tablet (81 mg total) by mouth daily   cholecalciferol (VITAMIN D3) 1,000 units tablet  Child, Self Yes No   Sig: Take 1,000 Units by mouth daily   finasteride (PROSCAR) 5 mg tablet  Child, Self No No   Sig: TAKE 1 TABLET BY MOUTH DAILY   furosemide (LASIX) 20 mg tablet  Child, Self No No   Sig: Take 4 tablets (80 mg total) by mouth 2 (two) times a day   gabapentin (NEURONTIN) 100 mg capsule  Child, Self No No   Sig: TAKE 1 CAPSULE BY MOUTH TWICE  DAILY   glipiZIDE (GLUCOTROL XL) 2.5 mg 24 hr tablet  Child, Self No No   Sig: TAKE 1 TABLET BY MOUTH DAILY   glucose blood (OneTouch Ultra) test strip  Child, Self No No   Sig: Check blood sugars once daily. Please substitute with appropriate alternative as covered by patient's insurance. Dx: E11.65   isosorbide dinitrate (ISORDIL) 10 mg tablet  Child, Self No No   Sig: TAKE 1 TABLET BY MOUTH TWICE  DAILY   lisinopril (ZESTRIL) 20 mg tablet  Child, Self No No   Sig: TAKE 1 TABLET BY MOUTH DAILY   metoprolol tartrate (LOPRESSOR) 25 mg tablet  Child, Self No No   Sig: Take 1 tablet (25 mg total) by mouth in the morning   vitamin B-12 (CYANOCOBALAMIN) 100 MCG tablet  Child, Self Yes No   Sig: Take 1,000 mcg by mouth daily      Facility-Administered Medications: None     Current Discharge Medication List        CONTINUE these medications which have NOT  CHANGED    Details   acetaminophen (TYLENOL) 500 mg tablet Take 500 mg by mouth every 6 (six) hours as needed for mild pain      ALPRAZolam (NIRAVAM) 0.25 MG dissolvable tablet Take 0.25 mg by mouth daily at bedtime as needed for anxiety      amLODIPine (NORVASC) 10 mg tablet TAKE 1 TABLET BY MOUTH DAILY  Qty: 90 tablet, Refills: 3    Comments: Please send a replace/new response with 90-Day Supply if appropriate to maximize member benefit. Requesting 1 year supply.  Associated Diagnoses: Essential hypertension      apixaban (ELIQUIS) 2.5 mg Take 1 tablet (2.5 mg total) by mouth 2 (two) times a day  Qty: 180 tablet, Refills: 3    Associated Diagnoses: Paroxysmal atrial fibrillation (HCC)      aspirin (ECOTRIN LOW STRENGTH) 81 mg EC tablet Take 1 tablet (81 mg total) by mouth daily    Associated Diagnoses: Coronary artery disease involving native coronary artery of native heart without angina pectoris      Blood Glucose Monitoring Suppl (ONE TOUCH ULTRA MINI) w/Device KIT Use daily  Qty: 1 kit, Refills: 0    Associated Diagnoses: Type 2 diabetes mellitus with stage 3b chronic kidney disease, without long-term current use of insulin ([facility])      cholecalciferol (VITAMIN D3) 1,000 units tablet Take 1,000 Units by mouth daily      finasteride (PROSCAR) 5 mg tablet TAKE 1 TABLET BY MOUTH DAILY  Qty: 90 tablet, Refills: 3    Comments: Please send a replace/new response with 90-Day Supply if appropriate to maximize member benefit. Requesting 1 year supply.  Associated Diagnoses: BPH with obstruction/lower urinary tract symptoms      furosemide (LASIX) 20 mg tablet Take 4 tablets (80 mg total) by mouth 2 (two) times a day    Associated Diagnoses: Chronic heart failure with preserved ejection fraction (HCC)      gabapentin (NEURONTIN) 100 mg capsule TAKE 1 CAPSULE BY MOUTH TWICE  DAILY  Qty: 180 capsule, Refills: 3    Comments: Please send a replace/new response with 90-Day Supply if appropriate to maximize member benefit.  Requesting 1 year supply.  Associated Diagnoses: Chronic pain syndrome      glipiZIDE (GLUCOTROL XL) 2.5 mg 24 hr tablet TAKE 1 TABLET BY MOUTH DAILY  Qty: 90 tablet, Refills: 3    Comments: Please send a replace/new response with 90-Day Supply if appropriate to maximize member benefit. Requesting 1 year supply.  Associated Diagnoses: Type 2 diabetes mellitus with stage 3b chronic kidney disease, without long-term current use of insulin ([facility])      glucose blood (OneTouch Ultra) test strip Check blood sugars once daily. Please substitute with appropriate alternative as covered by patient's insurance. Dx: E11.65  Qty: 100 each, Refills: 3    Associated Diagnoses: Type 2 diabetes mellitus with stage 3b chronic kidney disease, without long-term current use of insulin ([facility])      isosorbide dinitrate (ISORDIL) 10 mg tablet TAKE 1 TABLET BY MOUTH TWICE  DAILY  Qty: 180 tablet, Refills: 3    Comments: Please send a replace/new response with 90-Day Supply if appropriate to maximize member benefit. Requesting 1 year supply.  Associated Diagnoses: Coronary artery disease involving native coronary artery of native heart without angina pectoris      lisinopril (ZESTRIL) 20 mg tablet TAKE 1 TABLET BY MOUTH DAILY  Qty: 90 tablet, Refills: 3    Comments: Please send a replace/new response with 90-Day Supply if appropriate to maximize member benefit. Requesting 1 year supply.  Associated Diagnoses: Essential hypertension      metoprolol tartrate (LOPRESSOR) 25 mg tablet Take 1 tablet (25 mg total) by mouth in the morning  Qty: 30 tablet, Refills: 0    Associated Diagnoses: Paroxysmal atrial fibrillation (HCC)      Omega-3 Fatty Acids (FISH OIL CONCENTRATE PO) Take 1 tablet by mouth daily      vitamin B-12 (CYANOCOBALAMIN) 100 MCG tablet Take 1,000 mcg by mouth daily           No discharge procedures on file.  ED SEPSIS DOCUMENTATION   Time reflects when diagnosis was documented in both MDM as applicable and the Disposition within  this note       Time User Action Codes Description Comment    12/6/2024  6:56 PM Dipak Black Add [I50.9] CHF (congestive heart failure) ([facility])                  Dipak Blakc MD  12/09/24 0023

## 2024-12-10 LAB
ANION GAP SERPL CALCULATED.3IONS-SCNC: 6 MMOL/L (ref 4–13)
BUN SERPL-MCNC: 53 MG/DL (ref 5–25)
CALCIUM SERPL-MCNC: 9 MG/DL (ref 8.4–10.2)
CHLORIDE SERPL-SCNC: 91 MMOL/L (ref 96–108)
CO2 SERPL-SCNC: 37 MMOL/L (ref 21–32)
CREAT SERPL-MCNC: 1.97 MG/DL (ref 0.6–1.3)
ERYTHROCYTE [DISTWIDTH] IN BLOOD BY AUTOMATED COUNT: 15.4 % (ref 11.6–15.1)
GFR SERPL CREATININE-BSD FRML MDRD: 29 ML/MIN/1.73SQ M
GLUCOSE SERPL-MCNC: 184 MG/DL (ref 65–140)
GLUCOSE SERPL-MCNC: 197 MG/DL (ref 65–140)
GLUCOSE SERPL-MCNC: 279 MG/DL (ref 65–140)
GLUCOSE SERPL-MCNC: 304 MG/DL (ref 65–140)
GLUCOSE SERPL-MCNC: 360 MG/DL (ref 65–140)
HCT VFR BLD AUTO: 33.9 % (ref 36.5–49.3)
HGB BLD-MCNC: 10.7 G/DL (ref 12–17)
MCH RBC QN AUTO: 30.6 PG (ref 26.8–34.3)
MCHC RBC AUTO-ENTMCNC: 31.6 G/DL (ref 31.4–37.4)
MCV RBC AUTO: 97 FL (ref 82–98)
PLATELET # BLD AUTO: 125 THOUSANDS/UL (ref 149–390)
PMV BLD AUTO: 10.6 FL (ref 8.9–12.7)
POTASSIUM SERPL-SCNC: 3.7 MMOL/L (ref 3.5–5.3)
RBC # BLD AUTO: 3.5 MILLION/UL (ref 3.88–5.62)
SODIUM SERPL-SCNC: 134 MMOL/L (ref 135–147)
WBC # BLD AUTO: 7.06 THOUSAND/UL (ref 4.31–10.16)

## 2024-12-10 PROCEDURE — 82948 REAGENT STRIP/BLOOD GLUCOSE: CPT

## 2024-12-10 PROCEDURE — 80048 BASIC METABOLIC PNL TOTAL CA: CPT | Performed by: NURSE PRACTITIONER

## 2024-12-10 PROCEDURE — 99232 SBSQ HOSP IP/OBS MODERATE 35: CPT | Performed by: INTERNAL MEDICINE

## 2024-12-10 PROCEDURE — 85027 COMPLETE CBC AUTOMATED: CPT | Performed by: NURSE PRACTITIONER

## 2024-12-10 PROCEDURE — 99232 SBSQ HOSP IP/OBS MODERATE 35: CPT | Performed by: NURSE PRACTITIONER

## 2024-12-10 RX ADMIN — NIFEDIPINE 60 MG: 30 TABLET, FILM COATED, EXTENDED RELEASE ORAL at 09:38

## 2024-12-10 RX ADMIN — GABAPENTIN 100 MG: 100 CAPSULE ORAL at 09:38

## 2024-12-10 RX ADMIN — OMEGA-3 FATTY ACIDS CAP 1000 MG 1000 MG: 1000 CAP at 09:38

## 2024-12-10 RX ADMIN — Medication 1000 UNITS: at 09:38

## 2024-12-10 RX ADMIN — INSULIN LISPRO 6 UNITS: 100 INJECTION, SOLUTION INTRAVENOUS; SUBCUTANEOUS at 16:37

## 2024-12-10 RX ADMIN — DOCUSATE SODIUM 100 MG: 100 CAPSULE, LIQUID FILLED ORAL at 17:43

## 2024-12-10 RX ADMIN — BUMETANIDE 2 MG: 0.25 INJECTION INTRAMUSCULAR; INTRAVENOUS at 09:39

## 2024-12-10 RX ADMIN — ASPIRIN 81 MG: 81 TABLET, COATED ORAL at 09:38

## 2024-12-10 RX ADMIN — GABAPENTIN 100 MG: 100 CAPSULE ORAL at 17:43

## 2024-12-10 RX ADMIN — APIXABAN 2.5 MG: 2.5 TABLET, FILM COATED ORAL at 09:38

## 2024-12-10 RX ADMIN — INSULIN LISPRO 1 UNITS: 100 INJECTION, SOLUTION INTRAVENOUS; SUBCUTANEOUS at 07:42

## 2024-12-10 RX ADMIN — DOCUSATE SODIUM 100 MG: 100 CAPSULE, LIQUID FILLED ORAL at 09:40

## 2024-12-10 RX ADMIN — BUMETANIDE 2 MG: 0.25 INJECTION INTRAMUSCULAR; INTRAVENOUS at 17:43

## 2024-12-10 RX ADMIN — INSULIN LISPRO 4 UNITS: 100 INJECTION, SOLUTION INTRAVENOUS; SUBCUTANEOUS at 11:31

## 2024-12-10 RX ADMIN — FINASTERIDE 5 MG: 5 TABLET, FILM COATED ORAL at 09:38

## 2024-12-10 RX ADMIN — APIXABAN 2.5 MG: 2.5 TABLET, FILM COATED ORAL at 17:43

## 2024-12-10 RX ADMIN — METOPROLOL SUCCINATE 50 MG: 50 TABLET, EXTENDED RELEASE ORAL at 09:38

## 2024-12-10 RX ADMIN — SENNOSIDES 17.2 MG: 8.6 TABLET, FILM COATED ORAL at 21:33

## 2024-12-10 RX ADMIN — LISINOPRIL 20 MG: 20 TABLET ORAL at 09:38

## 2024-12-10 RX ADMIN — Medication 3 MG: at 21:33

## 2024-12-10 NOTE — PROGRESS NOTES
Progress Note - Hospitalist   Name: Tom Stewart 89 y.o. male I MRN: [id number]  Unit/Bed#: 2 E 264-01 I Date of Admission: 12/6/2024   Date of Service: 12/10/2024 I Hospital Day: 4    Assessment & Plan  Acute on chronic heart failure with preserved ejection fraction (HCC)  Wt Readings from Last 3 Encounters:   12/10/24 97 kg (213 lb 13.5 oz)   12/02/24 96.6 kg (213 lb)   10/30/24 91.6 kg (201 lb 15.1 oz)     Lab Results   Component Value Date    BNP 2,615 (H) 12/06/2024      Secondary to due to fluid overload.  Patient presents with lower extremity edema and fluid extravasation from the skin.  Significant weight gain compared to last office visit 4 days ago.  Patient supposed to take Lasix 80 mg twice a day.  Patient lives in a facility, per son he is compliant with meds as well as diet.  Follows with cardiology and last visit on 12/2 patient was euvolemic.  Plan:  Monitor strict I's and O's and daily weight  Net neg. 4.6L. Wt 213 -4lbs   Cardiac diet with fluid restriction  Cardiology consulted  Continue IV Bumex 2 mg twice daily  Home Lasix dose is 80 mg twice daily  Patient experiencing mild confusion likely due to diuresis vs hospital delirium   Echocardiogram LVEF now 35% with severe diastolic dysfunction.  Moderate to severe aortic stenosis mild to moderate mitral regurgitation.  EF is now reduced from 50% in 2023   Appreciate cardiology recommendations  Coronary artery disease involving native coronary artery of native heart without angina pectoris  History of ischemic heart disease.  S/p PCI in 2020.  Continue aspirin, metoprolol  Per cardiology notes patient allergic to statin  Paroxysmal atrial fibrillation (HCC)  Patient in A-fib with rate controlled on admission  Patient takes Eliquis 2.5 mg twice a day.   Continue metoprolol with hold parameters  Continue home regimen  Type 2 diabetes mellitus with stage 4 chronic kidney disease, without long-term current use of insulin ([facility])  Lab Results   Component  Value Date    HGBA1C 8.5 (H) 12/06/2024       Recent Labs     12/09/24  1042 12/09/24  1631 12/09/24  2059 12/10/24  0729   POCGLU 241* 334* 263* 184*       Blood Sugar Average: Last 72 hrs:  (P) [id number]  History of diabetes patient takes glipizide.  No insulin.  Continue insulin sliding scale.  Monitor for Accu-Cheks  Hypoglycemia protocol and diabetic diet    Stage 4 chronic kidney disease (HCC)  Lab Results   Component Value Date    EGFR 29 12/10/2024    EGFR 33 12/09/2024    EGFR 30 12/08/2024    CREATININE 1.97 (H) 12/10/2024    CREATININE 1.79 (H) 12/09/2024    CREATININE 1.94 (H) 12/08/2024     Baseline is 1.9-2.5; stable  Continue monitoring in the setting of IV diuretics.  Creatinine appears to be responding to IV diuresis  Avoid nephrotoxic agents and hypoperfusion.  Monitor I's and O's  Primary hypertension  Continue home blood pressure medications  Routine monitoring  Other Alzheimer's disease (HCC)  Patient with baseline dementia.  On assessment pleasantly confused.  Moderate aortic stenosis  Echocardiogram showing moderate to severe aortic stenosis   Appreciate recommendations.  Acute respiratory failure with hypoxia (HCC)  Patient son reports that patient used to be off of oxygen 1 to lift at home and ever since moved to facility his being on 1 to 2 L.  Currently requiring 1 to 2 L on admission.  Currently on 2L with SpO2 at 98%  Chest x-ray with interstitial edema per my reading pending final radiology reading  continue with IV diuretics and hopefully wean patient off oxygen if possible.  If not patient may require home oxygen evaluation    CAD s/p PCI to LAD      VTE Pharmacologic Prophylaxis: VTE Score: 5 High Risk (Score >/= 5) - Pharmacological DVT Prophylaxis Ordered: apixaban (Eliquis). Sequential Compression Devices Ordered.    Mobility:   Basic Mobility Inpatient Raw Score: 15  JH-HLM Goal: 4: Move to chair/commode  JH-HLM Achieved: 5: Stand (1 or more minutes)  JH-HLM Goal  achieved. Continue to encourage appropriate mobility.    Patient Centered Rounds: I performed bedside rounds with nursing staff today.   Discussions with Specialists or Other Care Team Provider: Wound Care, Cardiology, Case Management     Education and Discussions with Family / Patient: Updated  (granddaughter ) via phone.    Current Length of Stay: 4 day(s)  Current Patient Status: Inpatient   Certification Statement: The patient will continue to require additional inpatient hospital stay due to continuation of IV diuresis for heart failure exacerbation    Discharge Plan: Anticipate discharge in 24-48 hrs to rehab facility.    Code Status: Level 3 - DNAR and DNI    Subjective   Patient appears in no acute distress while laying in bed. He does seem to be experiencing mild confusion with slower sluggish responses likely due to diuresis regime. He denies any chest pain, shortness of breath, fever, or chills.     Objective :  Temp:  [97.8 °F (36.6 °C)-98 °F (36.7 °C)] 98 °F (36.7 °C)  HR:  [64-72] 72  BP: (121-146)/(67-72) 146/72  Resp:  [19] 19  SpO2:  [94 %-98 %] 98 %  O2 Device: Nasal cannula  Nasal Cannula O2 Flow Rate (L/min):  [2 L/min-3 L/min] 2 L/min    Body mass index is 30.68 kg/m².     Input and Output Summary (last 24 hours):     Intake/Output Summary (Last 24 hours) at 12/10/2024 0921  Last data filed at 12/10/2024 0600  Gross per 24 hour   Intake 720 ml   Output 1050 ml   Net -330 ml       Physical Exam  Vitals and nursing note reviewed.   Constitutional:       General: He is not in acute distress.     Appearance: Normal appearance.   Cardiovascular:      Rate and Rhythm: Normal rate and regular rhythm.      Pulses: Normal pulses.      Heart sounds: Normal heart sounds. No murmur heard.  Pulmonary:      Effort: Pulmonary effort is normal. No respiratory distress.      Breath sounds: Normal breath sounds.   Abdominal:      General: Bowel sounds are normal.   Musculoskeletal:      Right lower  leg: Edema present.      Left lower leg: Edema present.      Right ankle: Tenderness present.      Left ankle: Tenderness present.      Comments: Mild ulcerations of the lower left ankle present.   Skin:     General: Skin is warm and dry.   Neurological:      Mental Status: He is alert and oriented to person, place, and time. He is confused.   Psychiatric:         Mood and Affect: Mood normal.         Speech: Speech is delayed.         Behavior: Behavior is slowed.         Judgment: Judgment normal.         Lab Results: I have reviewed the following results:   Results from last 7 days   Lab Units 12/10/24  0559 12/07/24  0555 12/06/24  1707   WBC Thousand/uL 7.06   < > 9.63   HEMOGLOBIN g/dL 10.7*   < > 10.3*   HEMATOCRIT % 33.9*   < > 33.4*   PLATELETS Thousands/uL 125*   < > 141*   SEGS PCT %  --   --  94*   LYMPHO PCT %  --   --  5*   MONO PCT %  --   --  1*   EOS PCT %  --   --  0    < > = values in this interval not displayed.     Results from last 7 days   Lab Units 12/10/24  0559 12/07/24  0555 12/06/24  1803   SODIUM mmol/L 134*   < > 133*   POTASSIUM mmol/L 3.7   < > 4.5   CHLORIDE mmol/L 91*   < > 93*   CO2 mmol/L 37*   < > 34*   BUN mg/dL 53*   < > 61*   CREATININE mg/dL 1.97*   < > 2.15*   ANION GAP mmol/L 6   < > 6   CALCIUM mg/dL 9.0   < > 8.9   ALBUMIN g/dL  --   --  3.4*   TOTAL BILIRUBIN mg/dL  --   --  0.83   ALK PHOS U/L  --   --  40   ALT U/L  --   --  11   AST U/L  --   --  10*   GLUCOSE RANDOM mg/dL 197*   < > 352*    < > = values in this interval not displayed.         Results from last 7 days   Lab Units 12/10/24  0729 12/09/24 2059 12/09/24  1631 12/09/24  1042 12/09/24  0733 12/08/24  2101 12/08/24  1512 12/08/24  1124 12/08/24  0735 12/07/24  2052 12/07/24  1654 12/07/24  1137   POC GLUCOSE mg/dl 184* 263* 334* 241* 194* 272* 302* 270* 240* 263* 193* 286*     Results from last 7 days   Lab Units 12/06/24  2124   HEMOGLOBIN A1C % 8.5*           Recent Cultures (last 7 days):          Imaging Results Review: I reviewed radiology reports from this admission including: xray(s).  Other Study Results Review: No additional pertinent studies reviewed.    Last 24 Hours Medication List:     Current Facility-Administered Medications:     aluminum-magnesium hydroxide-simethicone (MAALOX) oral suspension 30 mL, Q4H PRN    apixaban (ELIQUIS) tablet 2.5 mg, BID    aspirin (ECOTRIN LOW STRENGTH) EC tablet 81 mg, Daily    bumetanide (BUMEX) injection 2 mg, BID    Cholecalciferol (VITAMIN D3) tablet 1,000 Units, Daily    docusate sodium (COLACE) capsule 100 mg, BID    finasteride (PROSCAR) tablet 5 mg, Daily    fish oil capsule 1,000 mg, Daily    gabapentin (NEURONTIN) capsule 100 mg, BID    insulin lispro (HumALOG/ADMELOG) 100 units/mL subcutaneous injection 1-6 Units, TID AC **AND** Fingerstick Glucose (POCT), 4x Daily AC and at bedtime    lisinopril (ZESTRIL) tablet 20 mg, Daily    melatonin tablet 3 mg, HS    metoprolol succinate (TOPROL-XL) 24 hr tablet 50 mg, Daily    NIFEdipine (PROCARDIA XL) 24 hr tablet 60 mg, Daily    polyethylene glycol (MIRALAX) packet 17 g, Daily PRN    senna (SENOKOT) tablet 17.2 mg, HS    sodium chloride (OCEAN) 0.65 % nasal spray 2 spray, 4x Daily PRN    Administrative Statements   Today, Patient Was Seen By: Mile Lagunas  I have spent a total time of 42 minutes in caring for this patient on the day of the visit/encounter including Instructions for management, Patient and family education, Counseling / Coordination of care, Documenting in the medical record, Reviewing / ordering tests, medicine, procedures  , Obtaining or reviewing history  , and Communicating with other healthcare professionals .    **Please Note: This note may have been constructed using a voice recognition system.**

## 2024-12-10 NOTE — ASSESSMENT & PLAN NOTE
Patient son reports that patient used to be off of oxygen 1 to lift at home and ever since moved to facility his being on 1 to 2 L.  Currently requiring 1 to 2 L on admission.  Currently on 2L with SpO2 at 98%  Chest x-ray with interstitial edema per my reading pending final radiology reading  continue with IV diuretics and hopefully wean patient off oxygen if possible.  If not patient may require home oxygen evaluation

## 2024-12-10 NOTE — ASSESSMENT & PLAN NOTE
HR is well-controlled, transition to Toprol-XL 50 mg daily.  [id number] at least 7, continue Eliquis 2.5 mg bid (age, creatinine).

## 2024-12-10 NOTE — ASSESSMENT & PLAN NOTE
Wt Readings from Last 3 Encounters:   12/10/24 97 kg (213 lb 13.5 oz)   12/02/24 96.6 kg (213 lb)   10/30/24 91.6 kg (201 lb 15.1 oz)     Lab Results   Component Value Date    BNP 2,615 (H) 12/06/2024      Secondary to due to fluid overload.  Patient presents with lower extremity edema and fluid extravasation from the skin.  Significant weight gain compared to last office visit 4 days ago.  Patient supposed to take Lasix 80 mg twice a day.  Patient lives in a facility, per son he is compliant with meds as well as diet.  Follows with cardiology and last visit on 12/2 patient was euvolemic.  Plan:  Monitor strict I's and O's and daily weight  Net neg. 4.6L. Wt 213 -4lbs   Cardiac diet with fluid restriction  Cardiology consulted  Continue IV Bumex 2 mg twice daily  Home Lasix dose is 80 mg twice daily  Patient experiencing mild confusion likely due to diuresis vs hospital delirium   Echocardiogram LVEF now 35% with severe diastolic dysfunction.  Moderate to severe aortic stenosis mild to moderate mitral regurgitation.  EF is now reduced from 50% in 2023   Appreciate cardiology recommendations

## 2024-12-10 NOTE — WOUND OSTOMY CARE
Consult Note - Wound   Tom Stewart 89 y.o. male MRN: [id number]  Unit/Bed#: 2 E 264-01 Encounter: [id number]        Assessment :   Patient admitted to [facility] due to acute on chronic heart failure with preserved EF. History of a-fib., CAD, BPH, CKD, Dementia, diabetes, CVA, HLD, HTN. Wound care nurse consulted for bilateral lower extremity wounds. Patient is agreeable to assessment, alert and oriented x3, continent of bowel, incontinent of bladder, assist of 1 to turn for assessment, is an assist with care.     1. Bilateral elbows, hips, sacrum, buttock, and heels- Skin is dry, intact, blanchable. Blanchable.     2. Right medial tibia- Wounds are scattered, oval and round in shape, partial thickness, 100% mixture of pink and yellow tissue, with small amount of serous drainage noted. Maria Elena-wounds are dry, intact, lower extremity noted with edema.     3. Right medial and posterior ankle- Wounds are scattered, irregular in shape, true depth unknown, approx. 90% yellow adhered tissue and 10% pink tissue, with small amount of serous drainage noted. Maria Elena-wound is dry, intact, no redness.     4. Left lateral tibia, proximal- two wounds pictured and measured together, are oval in shape, partial thickness, approx. 20% yellow adhered tissue and 80% pink tissue, with small amount of serous drainage noted. Maria Elena-wound is dry, intact, lower extremity noted with edema.     5. Left medial distal tibial and ankle- Wounds are scattered, irregular in shape, partial thickness, approx. 20% yellow adhered tissue and 80% pink tissue, with small amount of serous drainage noted. Maria Elena-wounds are dry, intact, lower extremity noted with edema.     Educated patient on importance of frequent offloading of pressure via turning, repositioning, and weight-shifting. Verbalized understanding of plan of care.    No induration, fluctuance, odor, warmth, redness, or purulence noted to the above noted wound. New dressings applied. Patient tolerated well,  reports moderate pain to the wounds. Primary nurse aware of plan of care. See flow sheets for more detailed assessment findings. Will follow along.      Skin care plans:  1-Hydraguard/Silicone Cream to bilateral sacrum, buttock, and heels BID and PRN  2-Elevate heels to offload pressure.  3-Ehob cushion in chair when out of bed.  4-Moisturize skin daily with skin nourishing cream.  5-Turn/reposition q2h for pressure re-distribution on skin.   6-B/L lower extremity wounds- Cleanse wounds with NSS, pat dry. Apply adaptic over wound beds and then apply Melgisorb/calcium alginate, cover with ABD pad, wrap with Shreya. Change every other day and as needed for soilage/displacement.       Wound 12/10/24 Tibial Right;Medial (Active)   Wound Image   12/10/24 1155   Wound Description Yellow;[place] 12/10/24 1155   Maria Elena-wound Assessment Dry;Intact;Edema 12/10/24 1155   Wound Length (cm) 2 cm 12/10/24 1155   Wound Width (cm) 5 cm 12/10/24 1155   Wound Depth (cm) 0.1 cm 12/10/24 1155   Wound Surface Area (cm^2) 10 cm^2 12/10/24 1155   Wound Volume (cm^3) 1 cm^3 12/10/24 1155   Calculated Wound Volume (cm^3) 1 cm^3 12/10/24 1155   Drainage Amount Small 12/10/24 1155   Drainage Description Serous 12/10/24 1155   Non-staged Wound Description Partial thickness 12/10/24 1155   Treatments Cleansed;Irrigation with NSS;Site care 12/10/24 1155   Dressing Other (Comment);Calcium Alginate;ABD;Dry dressing 12/10/24 1155   Wound packed? No 12/10/24 1155   Dressing Changed Changed 12/10/24 1155   Patient Tolerance Tolerated well 12/10/24 1155   Dressing Status Dry;Clean;Intact 12/10/24 1155       Wound 12/10/24 Tibial Left;Medial (Active)   Wound Image   12/10/24 1156   Wound Description Yellow;[place] 12/10/24 1156   Maria Elena-wound Assessment Dry;Intact;Edema 12/10/24 1156   Wound Length (cm) 12 cm 12/10/24 1156   Wound Width (cm) 7 cm 12/10/24 1156   Wound Depth (cm) 0.1 cm 12/10/24 1156   Wound Surface Area (cm^2) 84 cm^2 12/10/24 1156   Wound Volume  (cm^3) 8.4 cm^3 12/10/24 1156   Calculated Wound Volume (cm^3) 8.4 cm^3 12/10/24 1156   Drainage Amount Small 12/10/24 1156   Drainage Description Serous 12/10/24 1156   Non-staged Wound Description Partial thickness 12/10/24 1156   Treatments Cleansed;Irrigation with NSS;Site care 12/10/24 1156   Dressing Other (Comment);Calcium Alginate;ABD;Dry dressing 12/10/24 1156   Wound packed? No 12/10/24 1156   Dressing Changed Changed 12/10/24 1156   Patient Tolerance Tolerated well 12/10/24 1156   Dressing Status Clean;Dry;Intact 12/10/24 1156       Wound 12/10/24 Ankle Right;Medial;Posterior (Active)   Wound Image   12/10/24 1157   Wound Description Yellow;Slough;Pink 12/10/24 1157   Maria Elena-wound Assessment Dry;Intact 12/10/24 1157   Wound Length (cm) 3 cm 12/10/24 1157   Wound Width (cm) 4.5 cm 12/10/24 1157   Wound Depth (cm) 0.2 cm 12/10/24 1157   Wound Surface Area (cm^2) 13.5 cm^2 12/10/24 1157   Wound Volume (cm^3) 2.7 cm^3 12/10/24 1157   Calculated Wound Volume (cm^3) 2.7 cm^3 12/10/24 1157   Drainage Amount Small 12/10/24 1157   Drainage Description Serous 12/10/24 1157   Non-staged Wound Description Not applicable 12/10/24 1157   Treatments Cleansed;Irrigation with NSS;Site care 12/10/24 1157   Dressing Other (Comment);Calcium Alginate;ABD;Dry dressing 12/10/24 1157   Wound packed? No 12/10/24 1157   Dressing Changed Changed 12/10/24 1157   Patient Tolerance Tolerated well 12/10/24 1157   Dressing Status Clean;Dry;Intact 12/10/24 1157       Wound 12/10/24 Tibial Left;Lateral (Active)   Wound Image   12/10/24 1158   Wound Description Yellow;[place] 12/10/24 1158   Maria Elena-wound Assessment Dry;Intact 12/10/24 1158   Wound Length (cm) 4.2 cm 12/10/24 1158   Wound Width (cm) 6.5 cm 12/10/24 1158   Wound Depth (cm) 0.1 cm 12/10/24 1158   Wound Surface Area (cm^2) 27.3 cm^2 12/10/24 1158   Wound Volume (cm^3) 2.73 cm^3 12/10/24 1158   Calculated Wound Volume (cm^3) 2.73 cm^3 12/10/24 1158   Drainage Amount Small 12/10/24  1158   Drainage Description Serous 12/10/24 1158   Non-staged Wound Description Partial thickness 12/10/24 1158   Treatments Cleansed;Irrigation with NSS;Site care 12/10/24 1158   Dressing Other (Comment);Calcium Alginate;ABD;Dry dressing 12/10/24 1158   Wound packed? No 12/10/24 1158   Dressing Changed Changed 12/10/24 1158   Patient Tolerance Tolerated well 12/10/24 1158   Dressing Status Clean;Dry;Intact 12/10/24 1158     Contact through [employer] Secure Chat with any questions  Wound Care will continue to follow while inpatient    Lisa SHAWN RN [facility]  Wound and Ostomy care

## 2024-12-10 NOTE — DISCHARGE INSTR - OTHER ORDERS
Skin care plans:  1-Hydraguard/Silicone Cream to bilateral sacrum, buttock, and heels BID and PRN  2-Elevate heels to offload pressure.  3-Ehob cushion in chair when out of bed.  4-Moisturize skin daily with skin nourishing cream.  5-Turn/reposition q2h for pressure re-distribution on skin.   6-B/L lower extremity wounds- Cleanse wounds with NSS, pat dry. Apply adaptic over wound beds and then apply Melgisorb/calcium alginate, cover with ABD pad, wrap with Shreya. Change every other day and as needed for soilage/displacement.

## 2024-12-10 NOTE — ASSESSMENT & PLAN NOTE
Wt Readings from Last 3 Encounters:   12/10/24 97 kg (213 lb 13.5 oz)   12/02/24 96.6 kg (213 lb)   10/30/24 91.6 kg (201 lb 15.1 oz)   Hypervolemic on exam  CXR shows moderate pulmonary venous congestion with trace pleural effusions.    Continue IV Bumex, metoprolol and lisinopril     Consider switching to oral Bumex tomorrow.  Recommend strict I/O's, daily weights, and sodium restriction.  Received 1 dose of Diuril IV.  Overall conservative management based on advanced age and multiple comorbidities as well as dementia and CKD.  Patient is DNR/DNI.  Present ejection fraction is 35% down from 50% 1 year ago.  Patient is a poor candidate for LifeVest given dementia.  Patient is a poor candidate for any invasive cardiac evaluation.  Consider further goals of care and palliative care consultation.    Left Ventricle: Left ventricular cavity size is normal. Wall thickness is normal. The left ventricular ejection fraction is 35%. Systolic function is moderately reduced. There is moderate global hypokinesis with regional variation. Diastolic function is severely abnormal, consistent with ungraded restrictive relaxation.    Right Ventricle: Right ventricular cavity size is dilated. Systolic function is normal.    Left Atrium: The atrium is dilated.    Right Atrium: The atrium is dilated.    Aortic Valve: The aortic valve is trileaflet. The leaflets are thickened and calcified with reduced mobility. There is mild regurgitation. There is moderate to severe stenosis.  Peak velocity was 3.22 m/s.  Peak and mean gradients across the valve were 42 and 26 mmHg respectively.  Aortic valve area by the continuity equation method was 0.79 cm².    Mitral Valve: There is mild annular calcification. There is mild to moderate regurgitation.    Tricuspid Valve: There is mild regurgitation. The right ventricular systolic pressure is mildly elevated. The estimated right ventricular systolic pressure is 46.00 mmHg.    Pulmonic Valve: There  is trace regurgitation.    IVC/SVC: The right atrial pressure is estimated at 15.0 mmHg. The inferior vena cava is dilated. Respirophasic changes were blunted (less than 50% variation).

## 2024-12-10 NOTE — PLAN OF CARE
Problem: PAIN - ADULT  Goal: Verbalizes/displays adequate comfort level or baseline comfort level  Description: Interventions:  - Encourage patient to monitor pain and request assistance  - Assess pain using appropriate pain scale  - Administer analgesics based on type and severity of pain and evaluate response  - Implement non-pharmacological measures as appropriate and evaluate response  - Consider cultural and social influences on pain and pain management  - Notify physician/advanced practitioner if interventions unsuccessful or patient reports new pain  Outcome: Progressing     Problem: INFECTION - ADULT  Goal: Absence or prevention of progression during hospitalization  Description: INTERVENTIONS:  - Assess and monitor for signs and symptoms of infection  - Monitor lab/diagnostic results  - Monitor all insertion sites, i.e. indwelling lines, tubes, and drains  - Monitor endotracheal if appropriate and nasal secretions for changes in amount and color  - Gatzke appropriate cooling/warming therapies per order  - Administer medications as ordered  - Instruct and encourage patient and family to use good hand hygiene technique  - Identify and instruct in appropriate isolation precautions for identified infection/condition  Outcome: Progressing  Goal: Absence of fever/infection during neutropenic period  Description: INTERVENTIONS:  - Monitor WBC    Outcome: Progressing     Problem: SAFETY ADULT  Goal: Patient will remain free of falls  Description: INTERVENTIONS:  - Educate patient/family on patient safety including physical limitations  - Instruct patient to call for assistance with activity   - Consult OT/PT to assist with strengthening/mobility   - Keep Call bell within reach  - Keep bed low and locked with side rails adjusted as appropriate  - Keep care items and personal belongings within reach  - Initiate and maintain comfort rounds  - Make Fall Risk Sign visible to staff  - Offer Toileting every  Hours,  in advance of need  - Initiate/Maintain alarm  - Obtain necessary fall risk management equipment:   - Apply yellow socks and bracelet for high fall risk patients  - Consider moving patient to room near nurses station  Outcome: Progressing  Goal: Maintain or return to baseline ADL function  Description: INTERVENTIONS:  -  Assess patient's ability to carry out ADLs; assess patient's baseline for ADL function and identify physical deficits which impact ability to perform ADLs (bathing, care of mouth/teeth, toileting, grooming, dressing, etc.)  - Assess/evaluate cause of self-care deficits   - Assess range of motion  - Assess patient's mobility; develop plan if impaired  - Assess patient's need for assistive devices and provide as appropriate  - Encourage maximum independence but intervene and supervise when necessary  - Involve family in performance of ADLs  - Assess for home care needs following discharge   - Consider OT consult to assist with ADL evaluation and planning for discharge  - Provide patient education as appropriate  Outcome: Progressing  Goal: Maintains/Returns to pre admission functional level  Description: INTERVENTIONS:  - Perform AM-PAC 6 Click Basic Mobility/ Daily Activity assessment daily.  - Set and communicate daily mobility goal to care team and patient/family/caregiver.   - Collaborate with rehabilitation services on mobility goals if consulted  - Perform Range of Motion  times a day.  - Reposition patient every  hours.  - Dangle patient  times a day  - Stand patient  times a day  - Ambulate patient  times a day  - Out of bed to chair  times a day   - Out of bed for meals times a day  - Out of bed for toileting  - Record patient progress and toleration of activity level   Outcome: Progressing     Problem: DISCHARGE PLANNING  Goal: Discharge to home or other facility with appropriate resources  Description: INTERVENTIONS:  - Identify barriers to discharge w/patient and caregiver  - Arrange for  needed discharge resources and transportation as appropriate  - Identify discharge learning needs (meds, wound care, etc.)  - Arrange for interpretive services to assist at discharge as needed  - Refer to Case Management Department for coordinating discharge planning if the patient needs post-hospital services based on physician/advanced practitioner order or complex needs related to functional status, cognitive ability, or social support system  Outcome: Progressing     Problem: Knowledge Deficit  Goal: Patient/family/caregiver demonstrates understanding of disease process, treatment plan, medications, and discharge instructions  Description: Complete learning assessment and assess knowledge base.  Interventions:  - Provide teaching at level of understanding  - Provide teaching via preferred learning methods  Outcome: Progressing     Problem: RESPIRATORY - ADULT  Goal: Achieves optimal ventilation and oxygenation  Description: INTERVENTIONS:  - Assess for changes in respiratory status  - Assess for changes in mentation and behavior  - Position to facilitate oxygenation and minimize respiratory effort  - Oxygen administered by appropriate delivery if ordered  - Initiate smoking cessation education as indicated  - Encourage broncho-pulmonary hygiene including cough, deep breathe, Incentive Spirometry  - Assess the need for suctioning and aspirate as needed  - Assess and instruct to report SOB or any respiratory difficulty  - Respiratory Therapy support as indicated  Outcome: Progressing     Problem: MUSCULOSKELETAL - ADULT  Goal: Maintain or return mobility to safest level of function  Description: INTERVENTIONS:  - Assess patient's ability to carry out ADLs; assess patient's baseline for ADL function and identify physical deficits which impact ability to perform ADLs (bathing, care of mouth/teeth, toileting, grooming, dressing, etc.)  - Assess/evaluate cause of self-care deficits   - Assess range of motion  - Assess  patient's mobility  - Assess patient's need for assistive devices and provide as appropriate  - Encourage maximum independence but intervene and supervise when necessary  - Involve family in performance of ADLs  - Assess for home care needs following discharge   - Consider OT consult to assist with ADL evaluation and planning for discharge  - Provide patient education as appropriate  Outcome: Progressing  Goal: Maintain proper alignment of affected body part  Description: INTERVENTIONS:  - Support, maintain and protect limb and body alignment  - Provide patient/ family with appropriate education  Outcome: Progressing     Problem: Nutrition/Hydration-ADULT  Goal: Nutrient/Hydration intake appropriate for improving, restoring or maintaining nutritional needs  Description: Monitor and assess patient's nutrition/hydration status for malnutrition. Collaborate with interdisciplinary team and initiate plan and interventions as ordered.  Monitor patient's weight and dietary intake as ordered or per policy. Utilize nutrition screening tool and intervene as necessary. Determine patient's food preferences and provide high-protein, high-caloric foods as appropriate.     INTERVENTIONS:  - Monitor oral intake, urinary output, labs, and treatment plans  - Assess nutrition and hydration status and recommend course of action  - Evaluate amount of meals eaten  - Assist patient with eating if necessary   - Allow adequate time for meals  - Recommend/ encourage appropriate diets, oral nutritional supplements, and vitamin/mineral supplements  - Order, calculate, and assess calorie counts as needed  - Recommend, monitor, and adjust tube feedings and TPN/PPN based on assessed needs  - Assess need for intravenous fluids  - Provide specific nutrition/hydration education as appropriate  - Include patient/family/caregiver in decisions related to nutrition  Outcome: Progressing     Problem: Prexisting or High Potential for Compromised Skin  Integrity  Goal: Skin integrity is maintained or improved  Description: INTERVENTIONS:  - Identify patients at risk for skin breakdown  - Assess and monitor skin integrity  - Assess and monitor nutrition and hydration status  - Monitor labs   - Assess for incontinence   - Turn and reposition patient  - Assist with mobility/ambulation  - Relieve pressure over bony prominences  - Avoid friction and shearing  - Provide appropriate hygiene as needed including keeping skin clean and dry  - Evaluate need for skin moisturizer/barrier cream  - Collaborate with interdisciplinary team   - Patient/family teaching  - Consider wound care consult   Outcome: Progressing

## 2024-12-10 NOTE — PLAN OF CARE
Problem: PAIN - ADULT  Goal: Verbalizes/displays adequate comfort level or baseline comfort level  Description: Interventions:  - Encourage patient to monitor pain and request assistance  - Assess pain using appropriate pain scale  - Administer analgesics based on type and severity of pain and evaluate response  - Implement non-pharmacological measures as appropriate and evaluate response  - Consider cultural and social influences on pain and pain management  - Notify physician/advanced practitioner if interventions unsuccessful or patient reports new pain  Outcome: Progressing     Problem: INFECTION - ADULT  Goal: Absence or prevention of progression during hospitalization  Description: INTERVENTIONS:  - Assess and monitor for signs and symptoms of infection  - Monitor lab/diagnostic results  - Monitor all insertion sites, i.e. indwelling lines, tubes, and drains  - Monitor endotracheal if appropriate and nasal secretions for changes in amount and color  - Coats appropriate cooling/warming therapies per order  - Administer medications as ordered  - Instruct and encourage patient and family to use good hand hygiene technique  - Identify and instruct in appropriate isolation precautions for identified infection/condition  Outcome: Progressing  Goal: Absence of fever/infection during neutropenic period  Description: INTERVENTIONS:  - Monitor WBC    Outcome: Progressing     Problem: SAFETY ADULT  Goal: Patient will remain free of falls  Description: INTERVENTIONS:  - Educate patient/family on patient safety including physical limitations  - Instruct patient to call for assistance with activity   - Consult OT/PT to assist with strengthening/mobility   - Keep Call bell within reach  - Keep bed low and locked with side rails adjusted as appropriate  - Keep care items and personal belongings within reach  - Initiate and maintain comfort rounds  - Make Fall Risk Sign visible to staff  - Offer Toileting every  Hours,  in advance of need  - Initiate/Maintain alarm  - Obtain necessary fall risk management equipment:   - Apply yellow socks and bracelet for high fall risk patients  - Consider moving patient to room near nurses station  Outcome: Progressing  Goal: Maintain or return to baseline ADL function  Description: INTERVENTIONS:  -  Assess patient's ability to carry out ADLs; assess patient's baseline for ADL function and identify physical deficits which impact ability to perform ADLs (bathing, care of mouth/teeth, toileting, grooming, dressing, etc.)  - Assess/evaluate cause of self-care deficits   - Assess range of motion  - Assess patient's mobility; develop plan if impaired  - Assess patient's need for assistive devices and provide as appropriate  - Encourage maximum independence but intervene and supervise when necessary  - Involve family in performance of ADLs  - Assess for home care needs following discharge   - Consider OT consult to assist with ADL evaluation and planning for discharge  - Provide patient education as appropriate  Outcome: Progressing  Goal: Maintains/Returns to pre admission functional level  Description: INTERVENTIONS:  - Perform AM-PAC 6 Click Basic Mobility/ Daily Activity assessment daily.  - Set and communicate daily mobility goal to care team and patient/family/caregiver.   - Collaborate with rehabilitation services on mobility goals if consulted  - Perform Range of Motion  times a day.  - Reposition patient every  hours.  - Dangle patient  times a day  - Stand patient  times a day  - Ambulate patient  times a day  - Out of bed to chair  times a day   - Out of bed for meals  times a day  - Out of bed for toileting  - Record patient progress and toleration of activity level   Outcome: Progressing     Problem: DISCHARGE PLANNING  Goal: Discharge to home or other facility with appropriate resources  Description: INTERVENTIONS:  - Identify barriers to discharge w/patient and caregiver  - Arrange for  needed discharge resources and transportation as appropriate  - Identify discharge learning needs (meds, wound care, etc.)  - Arrange for interpretive services to assist at discharge as needed  - Refer to Case Management Department for coordinating discharge planning if the patient needs post-hospital services based on physician/advanced practitioner order or complex needs related to functional status, cognitive ability, or social support system  Outcome: Progressing     Problem: Knowledge Deficit  Goal: Patient/family/caregiver demonstrates understanding of disease process, treatment plan, medications, and discharge instructions  Description: Complete learning assessment and assess knowledge base.  Interventions:  - Provide teaching at level of understanding  - Provide teaching via preferred learning methods  Outcome: Progressing     Problem: RESPIRATORY - ADULT  Goal: Achieves optimal ventilation and oxygenation  Description: INTERVENTIONS:  - Assess for changes in respiratory status  - Assess for changes in mentation and behavior  - Position to facilitate oxygenation and minimize respiratory effort  - Oxygen administered by appropriate delivery if ordered  - Initiate smoking cessation education as indicated  - Encourage broncho-pulmonary hygiene including cough, deep breathe, Incentive Spirometry  - Assess the need for suctioning and aspirate as needed  - Assess and instruct to report SOB or any respiratory difficulty  - Respiratory Therapy support as indicated  Outcome: Progressing     Problem: MUSCULOSKELETAL - ADULT  Goal: Maintain or return mobility to safest level of function  Description: INTERVENTIONS:  - Assess patient's ability to carry out ADLs; assess patient's baseline for ADL function and identify physical deficits which impact ability to perform ADLs (bathing, care of mouth/teeth, toileting, grooming, dressing, etc.)  - Assess/evaluate cause of self-care deficits   - Assess range of motion  - Assess  patient's mobility  - Assess patient's need for assistive devices and provide as appropriate  - Encourage maximum independence but intervene and supervise when necessary  - Involve family in performance of ADLs  - Assess for home care needs following discharge   - Consider OT consult to assist with ADL evaluation and planning for discharge  - Provide patient education as appropriate  Outcome: Progressing  Goal: Maintain proper alignment of affected body part  Description: INTERVENTIONS:  - Support, maintain and protect limb and body alignment  - Provide patient/ family with appropriate education  Outcome: Progressing     Problem: Nutrition/Hydration-ADULT  Goal: Nutrient/Hydration intake appropriate for improving, restoring or maintaining nutritional needs  Description: Monitor and assess patient's nutrition/hydration status for malnutrition. Collaborate with interdisciplinary team and initiate plan and interventions as ordered.  Monitor patient's weight and dietary intake as ordered or per policy. Utilize nutrition screening tool and intervene as necessary. Determine patient's food preferences and provide high-protein, high-caloric foods as appropriate.     INTERVENTIONS:  - Monitor oral intake, urinary output, labs, and treatment plans  - Assess nutrition and hydration status and recommend course of action  - Evaluate amount of meals eaten  - Assist patient with eating if necessary   - Allow adequate time for meals  - Recommend/ encourage appropriate diets, oral nutritional supplements, and vitamin/mineral supplements  - Order, calculate, and assess calorie counts as needed  - Recommend, monitor, and adjust tube feedings and TPN/PPN based on assessed needs  - Assess need for intravenous fluids  - Provide specific nutrition/hydration education as appropriate  - Include patient/family/caregiver in decisions related to nutrition  Outcome: Progressing     Problem: Prexisting or High Potential for Compromised Skin  Integrity  Goal: Skin integrity is maintained or improved  Description: INTERVENTIONS:  - Identify patients at risk for skin breakdown  - Assess and monitor skin integrity  - Assess and monitor nutrition and hydration status  - Monitor labs   - Assess for incontinence   - Turn and reposition patient  - Assist with mobility/ambulation  - Relieve pressure over bony prominences  - Avoid friction and shearing  - Provide appropriate hygiene as needed including keeping skin clean and dry  - Evaluate need for skin moisturizer/barrier cream  - Collaborate with interdisciplinary team   - Patient/family teaching  - Consider wound care consult   Outcome: Progressing

## 2024-12-10 NOTE — ASSESSMENT & PLAN NOTE
Lab Results   Component Value Date    EGFR 29 12/10/2024    EGFR 33 12/09/2024    EGFR 30 12/08/2024    CREATININE 1.97 (H) 12/10/2024    CREATININE 1.79 (H) 12/09/2024    CREATININE 1.94 (H) 12/08/2024   Management per primary service.  CKD presently stable.

## 2024-12-10 NOTE — ASSESSMENT & PLAN NOTE
Lab Results   Component Value Date    EGFR 29 12/10/2024    EGFR 33 12/09/2024    EGFR 30 12/08/2024    CREATININE 1.97 (H) 12/10/2024    CREATININE 1.79 (H) 12/09/2024    CREATININE 1.94 (H) 12/08/2024     Baseline is 1.9-2.5; stable  Continue monitoring in the setting of IV diuretics.  Creatinine appears to be responding to IV diuresis  Avoid nephrotoxic agents and hypoperfusion.  Monitor I's and O's

## 2024-12-10 NOTE — ASSESSMENT & PLAN NOTE
Lab Results   Component Value Date    HGBA1C 8.5 (H) 12/06/2024       Recent Labs     12/09/24  1042 12/09/24  1631 12/09/24 2059 12/10/24  0729   POCGLU 241* 334* 263* 184*       Blood Sugar Average: Last 72 hrs:  (P) [id number]  History of diabetes patient takes glipizide.  No insulin.  Continue insulin sliding scale.  Monitor for Accu-Cheks  Hypoglycemia protocol and diabetic diet

## 2024-12-10 NOTE — ASSESSMENT & PLAN NOTE
Lab Results   Component Value Date    HGBA1C 8.5 (H) 12/06/2024     Recent Labs     12/09/24  1631 12/09/24  2059 12/10/24  0729 12/10/24  1116   POCGLU 334* 263* 184* 279*   Blood Sugar Average: Last 72 hrs:  (P) 258  Management per primary service.

## 2024-12-11 LAB
ANION GAP SERPL CALCULATED.3IONS-SCNC: 6 MMOL/L (ref 4–13)
BASOPHILS # BLD AUTO: 0.03 THOUSANDS/ÂΜL (ref 0–0.1)
BASOPHILS NFR BLD AUTO: 0 % (ref 0–1)
BUN SERPL-MCNC: 52 MG/DL (ref 5–25)
CALCIUM SERPL-MCNC: 8.8 MG/DL (ref 8.4–10.2)
CHLORIDE SERPL-SCNC: 89 MMOL/L (ref 96–108)
CO2 SERPL-SCNC: 38 MMOL/L (ref 21–32)
CREAT SERPL-MCNC: 1.98 MG/DL (ref 0.6–1.3)
EOSINOPHIL # BLD AUTO: 0.19 THOUSAND/ÂΜL (ref 0–0.61)
EOSINOPHIL NFR BLD AUTO: 3 % (ref 0–6)
ERYTHROCYTE [DISTWIDTH] IN BLOOD BY AUTOMATED COUNT: 15.5 % (ref 11.6–15.1)
GFR SERPL CREATININE-BSD FRML MDRD: 29 ML/MIN/1.73SQ M
GLUCOSE SERPL-MCNC: 180 MG/DL (ref 65–140)
GLUCOSE SERPL-MCNC: 219 MG/DL (ref 65–140)
GLUCOSE SERPL-MCNC: 263 MG/DL (ref 65–140)
GLUCOSE SERPL-MCNC: 274 MG/DL (ref 65–140)
GLUCOSE SERPL-MCNC: 388 MG/DL (ref 65–140)
HCT VFR BLD AUTO: 36.2 % (ref 36.5–49.3)
HGB BLD-MCNC: 10.9 G/DL (ref 12–17)
IMM GRANULOCYTES # BLD AUTO: 0.03 THOUSAND/UL (ref 0–0.2)
IMM GRANULOCYTES NFR BLD AUTO: 0 % (ref 0–2)
LYMPHOCYTES # BLD AUTO: 0.82 THOUSANDS/ÂΜL (ref 0.6–4.47)
LYMPHOCYTES NFR BLD AUTO: 12 % (ref 14–44)
MCH RBC QN AUTO: 29.5 PG (ref 26.8–34.3)
MCHC RBC AUTO-ENTMCNC: 30.1 G/DL (ref 31.4–37.4)
MCV RBC AUTO: 98 FL (ref 82–98)
MONOCYTES # BLD AUTO: 0.37 THOUSAND/ÂΜL (ref 0.17–1.22)
MONOCYTES NFR BLD AUTO: 5 % (ref 4–12)
NEUTROPHILS # BLD AUTO: 5.42 THOUSANDS/ÂΜL (ref 1.85–7.62)
NEUTS SEG NFR BLD AUTO: 80 % (ref 43–75)
NRBC BLD AUTO-RTO: 0 /100 WBCS
PLATELET # BLD AUTO: 141 THOUSANDS/UL (ref 149–390)
PMV BLD AUTO: 10.8 FL (ref 8.9–12.7)
POTASSIUM SERPL-SCNC: 4 MMOL/L (ref 3.5–5.3)
RBC # BLD AUTO: 3.69 MILLION/UL (ref 3.88–5.62)
SODIUM SERPL-SCNC: 133 MMOL/L (ref 135–147)
WBC # BLD AUTO: 6.86 THOUSAND/UL (ref 4.31–10.16)

## 2024-12-11 PROCEDURE — 85025 COMPLETE CBC W/AUTO DIFF WBC: CPT | Performed by: PHYSICIAN ASSISTANT

## 2024-12-11 PROCEDURE — 97110 THERAPEUTIC EXERCISES: CPT

## 2024-12-11 PROCEDURE — 99232 SBSQ HOSP IP/OBS MODERATE 35: CPT | Performed by: INTERNAL MEDICINE

## 2024-12-11 PROCEDURE — 97116 GAIT TRAINING THERAPY: CPT

## 2024-12-11 PROCEDURE — 80048 BASIC METABOLIC PNL TOTAL CA: CPT | Performed by: PHYSICIAN ASSISTANT

## 2024-12-11 PROCEDURE — 82948 REAGENT STRIP/BLOOD GLUCOSE: CPT

## 2024-12-11 PROCEDURE — 99232 SBSQ HOSP IP/OBS MODERATE 35: CPT | Performed by: NURSE PRACTITIONER

## 2024-12-11 RX ORDER — INSULIN LISPRO 100 [IU]/ML
1-6 INJECTION, SOLUTION INTRAVENOUS; SUBCUTANEOUS
Status: DISCONTINUED | OUTPATIENT
Start: 2024-12-11 | End: 2024-12-12 | Stop reason: HOSPADM

## 2024-12-11 RX ORDER — BUMETANIDE 1 MG/1
2 TABLET ORAL 2 TIMES DAILY
Status: DISCONTINUED | OUTPATIENT
Start: 2024-12-11 | End: 2024-12-12 | Stop reason: HOSPADM

## 2024-12-11 RX ADMIN — DOCUSATE SODIUM 100 MG: 100 CAPSULE, LIQUID FILLED ORAL at 17:59

## 2024-12-11 RX ADMIN — INSULIN LISPRO 2 UNITS: 100 INJECTION, SOLUTION INTRAVENOUS; SUBCUTANEOUS at 16:33

## 2024-12-11 RX ADMIN — METOPROLOL SUCCINATE 50 MG: 50 TABLET, EXTENDED RELEASE ORAL at 09:07

## 2024-12-11 RX ADMIN — Medication 1000 UNITS: at 09:07

## 2024-12-11 RX ADMIN — DOCUSATE SODIUM 100 MG: 100 CAPSULE, LIQUID FILLED ORAL at 09:07

## 2024-12-11 RX ADMIN — INSULIN LISPRO 1 UNITS: 100 INJECTION, SOLUTION INTRAVENOUS; SUBCUTANEOUS at 07:50

## 2024-12-11 RX ADMIN — BUMETANIDE 2 MG: 1 TABLET ORAL at 16:33

## 2024-12-11 RX ADMIN — Medication 3 MG: at 21:08

## 2024-12-11 RX ADMIN — GABAPENTIN 100 MG: 100 CAPSULE ORAL at 17:59

## 2024-12-11 RX ADMIN — INSULIN LISPRO 6 UNITS: 100 INJECTION, SOLUTION INTRAVENOUS; SUBCUTANEOUS at 21:48

## 2024-12-11 RX ADMIN — OMEGA-3 FATTY ACIDS CAP 1000 MG 1000 MG: 1000 CAP at 09:07

## 2024-12-11 RX ADMIN — GABAPENTIN 100 MG: 100 CAPSULE ORAL at 09:07

## 2024-12-11 RX ADMIN — BUMETANIDE 2 MG: 0.25 INJECTION INTRAMUSCULAR; INTRAVENOUS at 09:07

## 2024-12-11 RX ADMIN — INSULIN LISPRO 3 UNITS: 100 INJECTION, SOLUTION INTRAVENOUS; SUBCUTANEOUS at 11:17

## 2024-12-11 RX ADMIN — APIXABAN 2.5 MG: 2.5 TABLET, FILM COATED ORAL at 17:59

## 2024-12-11 RX ADMIN — SENNOSIDES 17.2 MG: 8.6 TABLET, FILM COATED ORAL at 21:08

## 2024-12-11 RX ADMIN — ASPIRIN 81 MG: 81 TABLET, COATED ORAL at 09:07

## 2024-12-11 RX ADMIN — FINASTERIDE 5 MG: 5 TABLET, FILM COATED ORAL at 09:07

## 2024-12-11 RX ADMIN — LISINOPRIL 20 MG: 20 TABLET ORAL at 09:07

## 2024-12-11 RX ADMIN — NIFEDIPINE 60 MG: 30 TABLET, FILM COATED, EXTENDED RELEASE ORAL at 09:07

## 2024-12-11 RX ADMIN — APIXABAN 2.5 MG: 2.5 TABLET, FILM COATED ORAL at 09:07

## 2024-12-11 NOTE — ASSESSMENT & PLAN NOTE
Lab Results   Component Value Date    HGBA1C 8.5 (H) 12/06/2024     Recent Labs     12/10/24  1636 12/10/24  2051 12/11/24  0744 12/11/24  1045   POCGLU 360* 304* 180* 263*   Blood Sugar Average: Last 72 hrs:  (P) [id number]  Management per primary service.

## 2024-12-11 NOTE — PLAN OF CARE
Problem: PHYSICAL THERAPY ADULT  Goal: Performs mobility at highest level of function for planned discharge setting.  See evaluation for individualized goals.  Description: Treatment/Interventions: Functional transfer training, LE strengthening/ROM, Therapeutic exercise, Endurance training, Patient/family training, Equipment eval/education, Bed mobility, Gait training, Spoke to nursing  Equipment Recommended: Walker (RW)       See flowsheet documentation for full assessment, interventions and recommendations.  Outcome: Progressing  Note: Prognosis: Good  Problem List: Decreased strength, Decreased endurance, Impaired balance, Decreased mobility, Impaired sensation, Pain  Assessment: Pt seen for PT treatment session this date, consisting of ther ex focused on strengthening and gt training on level surfaces to improve pt safety in household environment. Since previous session, pt has made good progress in terms of increased household distance gait trial, improved standing/ambulatory balance scores 1/2 grade, initiation of LB exercises with vc for technique. Current goals and POC established on IE remain appropriate, pt continues to have rehab potential and is making good progress towards STGs. Pt prognosis for achieving goals is good, pending pt progress with hospitalization/medical status improvements, and indicated by Stimulability and ability to follow directions. Pt continues to be functioning below baseline level, and remains limited 2* factors listed above. PT will continue to see pt during current hospitalization in order to address the deficits listed above and provide interventions consistent w/ POC in effort to achieve STGs. PT recommends level 2, moderate resource intensity upon discharge.  Barriers to Discharge: Inaccessible home environment, Decreased caregiver support     Rehab Resource Intensity Level, PT: II (Moderate Resource Intensity)    See flowsheet documentation for full assessment.

## 2024-12-11 NOTE — PROGRESS NOTES
Patient:    MRN:  [id number]    Aidin Request ID:  [id number]    Level of care reserved:    Partner Reserved:    Clinical needs requested:    Geography searched:  10 miles around [id number]    Start of Service:    Request sent:  4:15pm EST on 12/9/2024 by Rhonda Cadena    Partner reserved:    Choice list shared:

## 2024-12-11 NOTE — ASSESSMENT & PLAN NOTE
Wt Readings from Last 3 Encounters:   12/11/24 96.1 kg (211 lb 13.8 oz)   12/02/24 96.6 kg (213 lb)   10/30/24 91.6 kg (201 lb 15.1 oz)     Lab Results   Component Value Date    BNP 2,615 (H) 12/06/2024      Secondary to due to fluid overload.  Patient presents with lower extremity edema and fluid extravasation from the skin.  Significant weight gain compared to last office visit 4 days ago.  Patient supposed to take Lasix 80 mg twice a day.  Patient lives in a facility, per son he is compliant with meds as well as diet.  Follows with cardiology and last visit on 12/2 patient was euvolemic.  Plan:  Monitor strict I's and O's and daily weight  Net neg. 4.6L. Wt 213 -4lbs   Cardiac diet with fluid restriction  Cardiology consulted  Transitioned IV to p.o. Bumex twice daily/11  Home Lasix dose is 80 mg twice daily  Patient experiencing mild confusion likely due to diuresis vs hospital delirium   Echocardiogram LVEF now 35% with severe diastolic dysfunction.  Moderate to severe aortic stenosis mild to moderate mitral regurgitation.  EF is now reduced from 50% in 2023

## 2024-12-11 NOTE — ASSESSMENT & PLAN NOTE
HR is well-controlled, transition to Toprol-XL 50 mg daily.  [id number] at least 7, continue Eliquis 2.5 mg bid (age, creatinine).

## 2024-12-11 NOTE — CASE MANAGEMENT
Case Management Discharge Planning Note    Patient name Tom Stewart  Location 2 [facility] 264/2 E 264-01 MRN [id number]  : 1935 Date 2024       Current Admission Date: 2024  Current Admission Diagnosis:Acute on chronic heart failure with preserved ejection fraction (HCC)   Patient Active Problem List    Diagnosis Date Noted Date Diagnosed    CAD s/p PCI to LAD 2024     Encounter for delirium elderly at risk (DEAR) screening 10/28/2024     Frailty syndrome in geriatric patient 10/28/2024     Moderate aortic stenosis 10/28/2024     Acute respiratory failure with hypoxia (HCC) 10/25/2024     Sebaceous cyst 2024     Nose ulceration 2024     Primary hypertension 2023     Acute on chronic heart failure with preserved ejection fraction (HCC) 2023     Stage 4 chronic kidney disease (HCC) 2023     Gait instability 2022     Hearing loss 2022     Other Alzheimer's disease (HCC) 2022     Other proteinuria 2021     Vitamin D deficiency 2021     Ambulatory dysfunction 2021     Fall 2020     Type 2 diabetes mellitus with stage 4 chronic kidney disease, without long-term current use of insulin (HCC) 2019     Paroxysmal atrial fibrillation (HCC) 2019     History of CVA 2019     Mixed hyperlipidemia 2017     Coronary artery disease involving native coronary artery of native heart without angina pectoris 2017       LOS (days): 5  Geometric Mean LOS (GMLOS) (days): 3.9  Days to GMLOS:-0.7     OBJECTIVE:  Risk of Unplanned Readmission Score: 19.77      Current admission status: Inpatient   Preferred Pharmacy:   [id number] - [place] - [address]  [address]  [facility] [place] [address]  Phone: [phone] Fax: [phone]    [facility] - [place], [place] - [address]  [address]  [facility] [address]  [facility] [address]  Phone: [phone] Fax:  [phone]    Primary Care Provider: Beto Garnett DO    Primary Insurance: MEDICARE  Secondary Insurance: [employer]    DISCHARGE DETAILS:    Other Referral/Resources/Interventions Provided:  Interventions: Facility Return, Transportation  Referral Comments: Patient for return to [facility] today.  Now pending cards clearance.  Transport pushed back to 1230.  CM will continue to follow for updates and planning.  PVM aware.    Transport at Discharge : Wheelchair van     [phone]     ETA of Transport (Date): 12/11/24  ETA of Transport (Time): 1230    Accepting Facility Name, City & State : [place] [address]  Receiving Facility/Agency Phone Number: Phone: [phone]  Facility/Agency Fax Number: Fax: [phone]     @ [address] [place] [address] SLIM, patient not cleared for d/c today.  Anticipated for 24hrs.  CM updated RN and PVM.  Transport cancelled.

## 2024-12-11 NOTE — PHYSICAL THERAPY NOTE
"Physical Therapy Treatment Note       12/11/24 1013   PT Last Visit   PT Visit Date 12/11/24   Note Type   Note Type Treatment   Pain Assessment   Pain Assessment Tool 0-10   Pain Score 3   Pain Location/Orientation Orientation: Right  (ankle)   Restrictions/Precautions   Weight Bearing Precautions Per Order No   Other Precautions Cognitive;Chair Alarm;Bed Alarm;O2;Fall Risk;Pain;Hard of hearing  (2L O2 NC)   General   Chart Reviewed Yes   Response to Previous Treatment Patient with no complaints from previous session.   Family/Caregiver Present No   Cognition   Overall Cognitive Status Impaired   Arousal/Participation Alert;Cooperative   Attention Within functional limits   Orientation Level Oriented to person;Oriented to place;Oriented to situation   Memory Decreased short term memory;Decreased recall of recent events;Decreased recall of precautions   Following Commands Follows one step commands with increased time or repetition   Comments pt agreeable to PT session   Subjective   Subjective \"my knees feel weak\"   Bed Mobility   Additional Comments pt received OOB Upon arrival   Transfers   Sit to Stand 4  Minimal assistance   Additional items Assist x 1;Armrests;Increased time required;Verbal cues   Stand to Sit 4  Minimal assistance   Additional items Assist x 1;Armrests;Increased time required;Verbal cues   Additional Comments pt denying lightheadedness/ dizziness with transitional movements   Ambulation/Elevation   Gait pattern Decreased foot clearance;Wide RADHA;Short stride;Step to;Foward flexed   Gait Assistance 4  Minimal assist   Additional items Assist x 1;Verbal cues   Assistive Device Rolling walker   Distance 40'   Balance   Static Sitting Fair +   Dynamic Sitting Fair   Static Standing Fair   Dynamic Standing Fair -   Ambulatory Fair -   Endurance Deficit   Endurance Deficit Yes   Activity Tolerance   Activity Tolerance Patient tolerated treatment well   Nurse Made Aware JELENA Kwan   Exercises   Hip " Abduction Sitting;10 reps;AROM;Bilateral   Knee AROM Long Arc Quad Sitting;10 reps;AROM;Bilateral   Ankle Pumps Sitting;10 reps;AROM;Bilateral   Marching Sitting;10 reps;AROM;Bilateral   Assessment   Prognosis Good   Problem List Decreased strength;Decreased endurance;Impaired balance;Decreased mobility;Impaired sensation;Pain   Assessment Pt seen for PT treatment session this date, consisting of ther ex focused on strengthening and gt training on level surfaces to improve pt safety in household environment. Since previous session, pt has made good progress in terms of increased household distance gait trial, improved standing/ambulatory balance scores 1/2 grade, initiation of LB exercises with vc for technique. Current goals and POC established on IE remain appropriate, pt continues to have rehab potential and is making good progress towards STGs. Pt prognosis for achieving goals is good, pending pt progress with hospitalization/medical status improvements, and indicated by Stimulability and ability to follow directions. Pt continues to be functioning below baseline level, and remains limited 2* factors listed above. PT will continue to see pt during current hospitalization in order to address the deficits listed above and provide interventions consistent w/ POC in effort to achieve STGs. PT recommends level 2, moderate resource intensity upon discharge.   Barriers to Discharge Inaccessible home environment;Decreased caregiver support   Goals   Patient Goals to go home   STG Expiration Date 12/18/24   PT Treatment Day 1   Plan   Treatment/Interventions Functional transfer training;LE strengthening/ROM;Therapeutic exercise;Endurance training;Patient/family training;Equipment eval/education;Bed mobility;Gait training;Spoke to nursing   Progress Progressing toward goals   PT Frequency 3-5x/wk   Discharge Recommendation   Rehab Resource Intensity Level, PT II (Moderate Resource Intensity)   Equipment Recommended  Walker  (RW)   AM-PAC Basic Mobility Inpatient   Turning in Flat Bed Without Bedrails 3   Lying on Back to Sitting on Edge of Flat Bed Without Bedrails 3   Moving Bed to Chair 3   Standing Up From Chair Using Arms 3   Walk in Room 3   Climb 3-5 Stairs With Railing 1   Basic Mobility Inpatient Raw Score 16   Basic Mobility Standardized Score 38.32   [facility] Highest Level Of Mobility   -HL Goal 5: Stand one or more mins   -HLM Achieved 7: Walk 25 feet or more   Education   Education Provided Mobility training;Home exercise program;Assistive device   Patient Demonstrates acceptance/verbal understanding;Reinforcement needed;Explanation/teachback used   End of Consult   Patient Position at End of Consult Bedside chair;Bed/Chair alarm activated;All needs within reach       Chetna Lion, PT, DPT

## 2024-12-11 NOTE — ASSESSMENT & PLAN NOTE
HR is well-controlled, transition to Toprol-XL 50 mg daily.  [id number] at least 7, continue Eliquis 2.5 mg bid (age, creatinine).

## 2024-12-11 NOTE — ASSESSMENT & PLAN NOTE
Lab Results   Component Value Date    HGBA1C 8.5 (H) 12/06/2024       Recent Labs     12/10/24  1636 12/10/24  2051 12/11/24  0744 12/11/24  1045   POCGLU 360* 304* 180* 263*       Blood Sugar Average: Last 72 hrs:  (P) [id number]  History of diabetes patient takes glipizide.  No insulin.  Continue insulin sliding scale.  Monitor for Accu-Cheks  Hypoglycemia protocol and diabetic diet

## 2024-12-11 NOTE — ASSESSMENT & PLAN NOTE
Wt Readings from Last 3 Encounters:   12/11/24 96.1 kg (211 lb 13.8 oz)   12/02/24 96.6 kg (213 lb)   10/30/24 91.6 kg (201 lb 15.1 oz)     Lab Results   Component Value Date    BNP 2,615 (H) 12/06/2024      Secondary to due to fluid overload.  Patient presents with lower extremity edema and fluid extravasation from the skin.  Significant weight gain compared to last office visit 4 days ago.  Patient supposed to take Lasix 80 mg twice a day.  Patient lives in a facility, per son he is compliant with meds as well as diet.  Follows with cardiology and last visit on 12/2 patient was euvolemic.  Plan:  Monitor strict I's and O's and daily weight  Net neg. 4.6L. Wt 211 -4lbs   Cardiac diet with fluid restriction  Cardiology consulted  Transition to p.o. Bumex 2 mg twice daily 12/11  Home Lasix dose is 80 mg twice daily  Patient experiencing mild confusion likely due to diuresis vs hospital delirium   Seems improved today and back to his baseline  Echocardiogram LVEF now 35% with severe diastolic dysfunction.  Moderate to severe aortic stenosis mild to moderate mitral regurgitation.  EF is now reduced from 50% in 2023   Not a candidate for LifeVest given his dementia

## 2024-12-11 NOTE — ASSESSMENT & PLAN NOTE
Lab Results   Component Value Date    EGFR 29 12/11/2024    EGFR 29 12/10/2024    EGFR 33 12/09/2024    CREATININE 1.98 (H) 12/11/2024    CREATININE 1.97 (H) 12/10/2024    CREATININE 1.79 (H) 12/09/2024   Management per primary service.  CKD presently stable.

## 2024-12-11 NOTE — ASSESSMENT & PLAN NOTE
Wt Readings from Last 3 Encounters:   12/11/24 96.1 kg (211 lb 13.8 oz)   12/02/24 96.6 kg (213 lb)   10/30/24 91.6 kg (201 lb 15.1 oz)   Volume status has improved.  Will switch to oral Bumex.  2 mg twice a day.  Recommend strict I/O's, daily weights, and sodium restriction.  Overall conservative management based on advanced age and multiple comorbidities as well as dementia and CKD.  Patient is DNR/DNI.  Present ejection fraction is 35% down from 50% 1 year ago.  Patient is a poor candidate for LifeVest given dementia.  Patient is a poor candidate for any invasive cardiac evaluation.  Consider further goals of care and palliative care consultation.  Discussed with hospitalist service.    Left Ventricle: Left ventricular cavity size is normal. Wall thickness is normal. The left ventricular ejection fraction is 35%. Systolic function is moderately reduced. There is moderate global hypokinesis with regional variation. Diastolic function is severely abnormal, consistent with ungraded restrictive relaxation.    Right Ventricle: Right ventricular cavity size is dilated. Systolic function is normal.    Left Atrium: The atrium is dilated.    Right Atrium: The atrium is dilated.    Aortic Valve: The aortic valve is trileaflet. The leaflets are thickened and calcified with reduced mobility. There is mild regurgitation. There is moderate to severe stenosis.  Peak velocity was 3.22 m/s.  Peak and mean gradients across the valve were 42 and 26 mmHg respectively.  Aortic valve area by the continuity equation method was 0.79 cm².    Mitral Valve: There is mild annular calcification. There is mild to moderate regurgitation.    Tricuspid Valve: There is mild regurgitation. The right ventricular systolic pressure is mildly elevated. The estimated right ventricular systolic pressure is 46.00 mmHg.    Pulmonic Valve: There is trace regurgitation.    IVC/SVC: The right atrial pressure is estimated at 15.0 mmHg. The inferior vena cava  is dilated. Respirophasic changes were blunted (less than 50% variation).

## 2024-12-11 NOTE — ASSESSMENT & PLAN NOTE
Lab Results   Component Value Date    HGBA1C 8.5 (H) 12/06/2024     Recent Labs     12/10/24  1636 12/10/24  2051 12/11/24  0744 12/11/24  1045   POCGLU 360* 304* 180* 263*   Blood Sugar Average: Last 72 hrs:  (P) [id number]  Management per primary service.

## 2024-12-11 NOTE — TELEPHONE ENCOUNTER
Pt's daughter called and would like a call back ASAP to discuss pt's medications     Please cb at [phone]
Spoke with pt's daughter
with RW/good balance

## 2024-12-11 NOTE — ASSESSMENT & PLAN NOTE
Wt Readings from Last 3 Encounters:   12/11/24 96.1 kg (211 lb 13.8 oz)   12/02/24 96.6 kg (213 lb)   10/30/24 91.6 kg (201 lb 15.1 oz)   Hypervolemic on exam  CXR shows moderate pulmonary venous congestion with trace pleural effusions.    Continue IV Bumex, metoprolol and lisinopril     Consider switching to oral Bumex tomorrow.  Recommend strict I/O's, daily weights, and sodium restriction.  Received 1 dose of Diuril IV.  Overall conservative management based on advanced age and multiple comorbidities as well as dementia and CKD.  Patient is DNR/DNI.  Present ejection fraction is 35% down from 50% 1 year ago.  Patient is a poor candidate for LifeVest given dementia.  Patient is a poor candidate for any invasive cardiac evaluation.  Consider further goals of care and palliative care consultation.    Left Ventricle: Left ventricular cavity size is normal. Wall thickness is normal. The left ventricular ejection fraction is 35%. Systolic function is moderately reduced. There is moderate global hypokinesis with regional variation. Diastolic function is severely abnormal, consistent with ungraded restrictive relaxation.    Right Ventricle: Right ventricular cavity size is dilated. Systolic function is normal.    Left Atrium: The atrium is dilated.    Right Atrium: The atrium is dilated.    Aortic Valve: The aortic valve is trileaflet. The leaflets are thickened and calcified with reduced mobility. There is mild regurgitation. There is moderate to severe stenosis.  Peak velocity was 3.22 m/s.  Peak and mean gradients across the valve were 42 and 26 mmHg respectively.  Aortic valve area by the continuity equation method was 0.79 cm².    Mitral Valve: There is mild annular calcification. There is mild to moderate regurgitation.    Tricuspid Valve: There is mild regurgitation. The right ventricular systolic pressure is mildly elevated. The estimated right ventricular systolic pressure is 46.00 mmHg.    Pulmonic Valve: There  is trace regurgitation.    IVC/SVC: The right atrial pressure is estimated at 15.0 mmHg. The inferior vena cava is dilated. Respirophasic changes were blunted (less than 50% variation).

## 2024-12-11 NOTE — PROGRESS NOTES
Progress Note - Cardiology   Name: Tom Stewart 89 y.o. male I MRN: [id number]  Unit/Bed#: 2 E 264-01 I Date of Admission: 12/6/2024   Date of Service: 12/11/2024 I Hospital Day: 5     Assessment & Plan  Acute on chronic heart failure with preserved ejection fraction (HCC)  Wt Readings from Last 3 Encounters:   12/11/24 96.1 kg (211 lb 13.8 oz)   12/02/24 96.6 kg (213 lb)   10/30/24 91.6 kg (201 lb 15.1 oz)   Volume status has improved.  Will switch to oral Bumex.  2 mg twice a day.  Recommend strict I/O's, daily weights, and sodium restriction.  Overall conservative management based on advanced age and multiple comorbidities as well as dementia and CKD.  Patient is DNR/DNI.  Present ejection fraction is 35% down from 50% 1 year ago.  Patient is a poor candidate for LifeVest given dementia.  Patient is a poor candidate for any invasive cardiac evaluation.  Consider further goals of care and palliative care consultation.  Discussed with hospitalist service.    Left Ventricle: Left ventricular cavity size is normal. Wall thickness is normal. The left ventricular ejection fraction is 35%. Systolic function is moderately reduced. There is moderate global hypokinesis with regional variation. Diastolic function is severely abnormal, consistent with ungraded restrictive relaxation.    Right Ventricle: Right ventricular cavity size is dilated. Systolic function is normal.    Left Atrium: The atrium is dilated.    Right Atrium: The atrium is dilated.    Aortic Valve: The aortic valve is trileaflet. The leaflets are thickened and calcified with reduced mobility. There is mild regurgitation. There is moderate to severe stenosis.  Peak velocity was 3.22 m/s.  Peak and mean gradients across the valve were 42 and 26 mmHg respectively.  Aortic valve area by the continuity equation method was 0.79 cm².    Mitral Valve: There is mild annular calcification. There is mild to moderate regurgitation.    Tricuspid Valve: There is mild  regurgitation. The right ventricular systolic pressure is mildly elevated. The estimated right ventricular systolic pressure is 46.00 mmHg.    Pulmonic Valve: There is trace regurgitation.    IVC/SVC: The right atrial pressure is estimated at 15.0 mmHg. The inferior vena cava is dilated. Respirophasic changes were blunted (less than 50% variation).  CAD s/p PCI to LAD  S/p  PCI with balloon angioplasty and CONCEPCION to each of the lesions.  Continue ASA and Toprol   Not on statin due to history of allergy (hives).  Paroxysmal atrial fibrillation (HCC)  HR is well-controlled, transition to Toprol-XL 50 mg daily.  [id number] at least 7, continue Eliquis 2.5 mg bid (age, creatinine).  Moderate aortic stenosis  Patient/family previously opted for conservative management given advanced age in setting of multiple comorbidities including dementia.  Primary hypertension  BP is elevated, continue to monitor.    Continue Toprol-XL, lisinopril and discontinue amlodipine  Start nifedipine 60 mg daily   Type 2 diabetes mellitus with stage 4 chronic kidney disease, without long-term current use of insulin (HCC)  Lab Results   Component Value Date    HGBA1C 8.5 (H) 12/06/2024     Recent Labs     12/10/24  1636 12/10/24  2051 12/11/24  0744 12/11/24  1045   POCGLU 360* 304* 180* 263*   Blood Sugar Average: Last 72 hrs:  (P) [id number]  Management per primary service.  Stage 4 chronic kidney disease (HCC)  Lab Results   Component Value Date    EGFR 29 12/11/2024    EGFR 29 12/10/2024    EGFR 33 12/09/2024    CREATININE 1.98 (H) 12/11/2024    CREATININE 1.97 (H) 12/10/2024    CREATININE 1.79 (H) 12/09/2024   Management per primary service.  CKD presently stable.  Other Alzheimer's disease (HCC)  Management per primary service.    General Cardiology   Progress Note   Tom Stewart 89 y.o. male MRN: [id number]  Unit/Bed#: 2 E 264-01 Encounter: [id number]      SUBJECTIVE:   No significant events overnight.  Patient is a poor  historian secondary to dementia.  Shortness of breath and lower extremity edema have improved.    REVIEW OF SYSTEMS:  Constitutional:  Denies fever or chills   Eyes:  Denies change in visual acuity   HENT:  Denies nasal congestion or sore throat   Respiratory:  shortness of breath   Cardiovascular:   edema   GI:  Denies abdominal pain, nausea, vomiting, bloody stools or diarrhea   :  Denies dysuria, frequency, difficulty in micturition and nocturia  Musculoskeletal:  Denies back pain or joint pain   Neurologic:  Denies headache, focal weakness or sensory changes   Endocrine:  Denies polyuria or polydipsia   Lymphatic:  Denies swollen glands   Psychiatric:  Denies depression or anxiety     OBJECTIVE:   Vitals:  Vitals:    24 0907   BP: 141/66   Pulse: 66   Resp:    Temp:    SpO2:      Body mass index is 30.4 kg/m².  Systolic (24hrs), Av , Min:126 , Max:150     Diastolic (24hrs), Av, Min:66, Max:76      Intake/Output Summary (Last 24 hours) at 2024 1408  Last data filed at 2024 1053  Gross per 24 hour   Intake 660 ml   Output 950 ml   Net -290 ml     Weight (last 2 days)       Date/Time Weight    24 06 96.1 (211.86)    12/10/24 0600 97 (213.85)                PHYSICAL EXAMS:  General:  Patient is not in acute distress, laying in the bed comfortably, awake, alert responding to commands.  Head: Normocephalic, Atraumatic.  HEENT:  Both pupils normal-size atraumatic, normocephalic, nonicteric  Neck:  JVP not raised. Trachea central  Respiratory: Decreased breath sounds bilateral  Cardiovascular: Irregularly irregular with 3/6 systolic ejection murmur in the right sternal border.  GI:  Abdomen soft nontender. Liver and spleen normal size  Lymphatic:  No cervical or inguinal lymphadenopathy  Neurologic:  Patient is awake alert, responding to command, well-oriented to time and place and person moving     LABORATORY RESULTS:        CBC with diff:   Results from last 7 days   Lab Units  12/11/24 0955 12/10/24  0559 12/09/24 0443 12/07/24 0555 12/06/24  1707   WBC Thousand/uL 6.86 7.06 6.05   < > 9.63   HEMOGLOBIN g/dL 10.9* 10.7* 10.7*   < > 10.3*   HEMATOCRIT % 36.2* 33.9* 33.8*   < > 33.4*   MCV fL 98 97 99*   < > 98   PLATELETS Thousands/uL 141* 125* 121*   < > 141*   RBC Million/uL 3.69* 3.50* 3.43*   < > 3.40*   MCH pg 29.5 30.6 31.2   < > 30.3   MCHC g/dL 30.1* 31.6 31.7   < > 30.8*   RDW % 15.5* 15.4* 15.3*   < > 15.2*   MPV fL 10.8 10.6 11.1   < > 11.3   NRBC AUTO /100 WBCs 0  --   --   --  0    < > = values in this interval not displayed.       CMP:  Results from last 7 days   Lab Units 12/11/24  0955 12/10/24  0559 12/09/24 0443 12/07/24 0555 12/06/24  1803   POTASSIUM mmol/L 4.0 3.7 3.8   < > 4.5   CHLORIDE mmol/L 89* 91* 93*   < > 93*   CO2 mmol/L 38* 37* 35*   < > 34*   BUN mg/dL 52* 53* 52*   < > 61*   CREATININE mg/dL 1.98* 1.97* 1.79*   < > 2.15*   CALCIUM mg/dL 8.8 9.0 8.8   < > 8.9   AST U/L  --   --   --   --  10*   ALT U/L  --   --   --   --  11   ALK PHOS U/L  --   --   --   --  40   EGFR ml/min/1.73sq m 29 29 33   < > 26    < > = values in this interval not displayed.       BMP:  Results from last 7 days   Lab Units 12/11/24 0955 12/10/24  0559 12/09/24 0443   POTASSIUM mmol/L 4.0 3.7 3.8   CHLORIDE mmol/L 89* 91* 93*   CO2 mmol/L 38* 37* 35*   BUN mg/dL 52* 53* 52*   CREATININE mg/dL 1.98* 1.97* 1.79*   CALCIUM mg/dL 8.8 9.0 8.8            Results from last 7 days   Lab Units 12/09/24  0443 12/08/24  1011   MAGNESIUM mg/dL 2.1 2.1     Results from last 7 days   Lab Units 12/06/24  2124   HEMOGLOBIN A1C % 8.5*               Lipid Profile:   Lab Results   Component Value Date    CHOL 190 12/01/2015    CHOL 210 07/14/2015    CHOL 198 09/11/2014     Lab Results   Component Value Date    HDL 46 06/11/2024    HDL 44 01/30/2024    HDL 57 01/10/2023     Lab Results   Component Value Date    LDLCALC 125 (H) 06/11/2024    LDLCALC 131 (H) 01/30/2024    LDLCALC 139 (H) 01/10/2023      Lab Results   Component Value Date    TRIG 138 2024    TRIG 135 2024    TRIG 94 01/10/2023       Cardiac testing:  Results for orders placed during the hospital encounter of 21    Echo complete with contrast if indicated    [facility]  [address] [place] [address]  [phone]    Transthoracic Echocardiogram  2D, M-mode, Doppler, and Color Doppler    Study date:  26-May-2021    Patient: NUZHAT BARBER  MR number: [id number]  Account number: [id number]  : 1935  Age: 85 years  Gender: Male  Status: Outpatient  Location: [facility]  Height: 69 in  Weight: 221 lb  BP: 122/ 86 mmHg    Indications: Aortic Stenosis.    Diagnoses: I35.0 - Nonrheumatic aortic (valve) stenosis    Sonographer:  RIP Hunt  Primary Physician:  Beto Garnett DO  Referring Physician:  Katlin Sweeney MD  Group:  [facility]  Interpreting Physician:  Arabella Kenny MD    SUMMARY    LEFT VENTRICLE:  Systolic function was normal. Ejection fraction was estimated to be 55 %.  Although no diagnostic regional wall motion abnormality was identified, this possibility cannot be completely excluded on the basis of this study.  Wall thickness was mildly increased.  There was mild concentric hypertrophy.    RIGHT VENTRICLE:  The size was normal.  Systolic function was normal.    LEFT ATRIUM:  The atrium was mildly dilated.    RIGHT ATRIUM:  The atrium was mildly dilated.    MITRAL VALVE:  There was moderate annular calcification.  There was mild regurgitation.    AORTIC VALVE:  The valve was trileaflet. Leaflets exhibited moderately increased thickness, moderate calcification, and mildly reduced cuspal separation.  Transaortic velocity and gradient were increased due to stenosis as well as concomitant increased transaortic flow.  There was mild stenosis.  There was mild regurgitation.  Valve peak gradient was 30 mmHg.  Valve mean gradient  was 19 mmHg.  Aortic valve area was 1.8 cmï¾² by the continuity equation.    TRICUSPID VALVE:  There was mild regurgitation.  Pulmonary artery systolic pressure was within the normal range.    AORTA:  The root exhibited dilatation - 4.0 cm.    HISTORY: PRIOR HISTORY: CAD, CVA, Diabetes Mellitus, Atrial Fibrillation, CKD III, DVT, TIA, Stent.    PROCEDURE: The study was performed in the [facility]. This was a routine study. The transthoracic approach was used. The study included complete 2D imaging, M-mode, complete spectral Doppler, and color Doppler. The  heart rate was 68 bpm, at the start of the study. Images were obtained from the parasternal, apical, subcostal, and suprasternal notch acoustic windows. Echocardiographic views were limited due to chest wall deformity and decreased  penetration. This was a technically difficult study.    LEFT VENTRICLE: Size was normal. Systolic function was normal. Ejection fraction was estimated to be 55 %. Although no diagnostic regional wall motion abnormality was identified, this possibility cannot be completely excluded on the basis  of this study. Wall thickness was mildly increased. There was mild concentric hypertrophy. No evidence of apical thrombus.    RIGHT VENTRICLE: The size was normal. Systolic function was normal. Wall thickness was normal.    LEFT ATRIUM: The atrium was mildly dilated.    RIGHT ATRIUM: The atrium was mildly dilated.    MITRAL VALVE: There was moderate annular calcification. There was normal leaflet separation. DOPPLER: The transmitral velocity was within the normal range. There was no evidence for stenosis. There was mild regurgitation.    AORTIC VALVE: The valve was trileaflet. Leaflets exhibited moderately increased thickness, moderate calcification, and mildly reduced cuspal separation. DOPPLER: Transaortic velocity and gradient were increased due to stenosis as well as  concomitant increased transaortic flow. There was  mild stenosis. There was mild regurgitation.    TRICUSPID VALVE: The valve structure was normal. There was normal leaflet separation. DOPPLER: The transtricuspid velocity was within the normal range. There was no evidence for stenosis. There was mild regurgitation. Pulmonary artery  systolic pressure was within the normal range.    PULMONIC VALVE: Leaflets exhibited normal thickness, no calcification, and normal cuspal separation. DOPPLER: The transpulmonic velocity was within the normal range. There was no significant regurgitation.    PERICARDIUM: There was no pericardial effusion. The pericardium was normal in appearance.    AORTA: The root exhibited dilatation - 4.0 cm.    SYSTEMIC VEINS: IVC: The inferior vena cava was normal in size. Respirophasic changes were normal.    MEASUREMENT TABLES    DOPPLER MEASUREMENTS  Aortic valve   (Reference normals)  Peak gradient   30 mmHg   (--)  Mean gradient   19 mmHg   (--)  Valve area, cont   1.8 cmï¾²   (--)    SYSTEM MEASUREMENT TABLES    2D  %FS: 29.82 %  Ao Diam: 3.98 cm  EDV(Teich): 142.11 ml  EF(Teich): 56.44 %  ESV(Teich): 61.9 ml  IVSd: 1.29 cm  LA Area: 24.41 cm2  LA Diam: 5.07 cm  LVEDV MOD A4C: 147.44 ml  LVEF MOD A4C: 59.76 %  LVESV MOD A4C: 59.32 ml  LVIDd: 5.41 cm  LVIDs: 3.8 cm  LVLd A4C: 8.48 cm  LVLs A4C: 7.15 cm  LVOT Diam: 2.34 cm  LVPWd: 1.19 cm  RA Area: 23.34 cm2  RVIDd: 4.46 cm  SV MOD A4C: 88.12 ml  SV(Teich): 80.2 ml    CW  AV Env.Ti: 302.95 ms  AV MaxP.17 mmHg  AV VTI: 54.96 cm  AV Vmax: 2.4 m/s  AV Vmean: 1.81 m/s  AV meanP.48 mmHg  TR MaxP.57 mmHg  TR Vmax: 2.72 m/s    MM  TAPSE: 2.23 cm    PW  RICHY (VTI): 1.45 cm2  RICHY Vmax: 1.41 cm2  LVOT Env.Ti: 340.63 ms  LVOT VTI: 18.57 cm  LVOT Vmax: 0.79 m/s  LVOT Vmean: 0.55 m/s  LVOT maxP.52 mmHg  LVOT meanP.35 mmHg  LVSV Dopp: 79.59 ml    [facility] Accredited Echocardiography Laboratory    Prepared and electronically signed by    Arabella Kenny MD  Signed  "27-May-2021 08:36:11    No results found for this or any previous visit.    No results found for this or any previous visit.    No valid procedures specified.  No results found for this or any previous visit.      Meds/Allergies   all current active meds have been reviewed    Medications Prior to Admission:     acetaminophen (TYLENOL) 500 mg tablet    ALPRAZolam (NIRAVAM) 0.25 MG dissolvable tablet    amLODIPine (NORVASC) 10 mg tablet    apixaban (ELIQUIS) 2.5 mg    aspirin (ECOTRIN LOW STRENGTH) 81 mg EC tablet    Blood Glucose Monitoring Suppl (ONE TOUCH ULTRA MINI) w/Device KIT    cholecalciferol (VITAMIN D3) 1,000 units tablet    finasteride (PROSCAR) 5 mg tablet    furosemide (LASIX) 20 mg tablet    gabapentin (NEURONTIN) 100 mg capsule    glipiZIDE (GLUCOTROL XL) 2.5 mg 24 hr tablet    glucose blood (OneTouch Ultra) test strip    isosorbide dinitrate (ISORDIL) 10 mg tablet    lisinopril (ZESTRIL) 20 mg tablet    metoprolol tartrate (LOPRESSOR) 25 mg tablet    Omega-3 Fatty Acids (FISH OIL CONCENTRATE PO)    vitamin B-12 (CYANOCOBALAMIN) 100 MCG tablet       Katlin Sweeney MD  12/11/2024,2:08 PM    Portions of the record may have been created with voice recognition software.  Occasional wrong word or \"sound a like\" substitutions may have occurred due to the inherent limitations of voice recognition software.  Read the chart carefully and recognize, using context, where substitutions have occurred.   "

## 2024-12-11 NOTE — ASSESSMENT & PLAN NOTE
Lab Results   Component Value Date    EGFR 29 12/11/2024    EGFR 29 12/10/2024    EGFR 33 12/09/2024    CREATININE 1.98 (H) 12/11/2024    CREATININE 1.97 (H) 12/10/2024    CREATININE 1.79 (H) 12/09/2024     Baseline is 1.9-2.5; stable  Continue monitoring in the setting of IV diuretics.  Creatinine appears to be responding to IV diuresis  Avoid nephrotoxic agents and hypoperfusion.  Monitor I's and O's

## 2024-12-11 NOTE — PLAN OF CARE
Problem: PAIN - ADULT  Goal: Verbalizes/displays adequate comfort level or baseline comfort level  Description: Interventions:  - Encourage patient to monitor pain and request assistance  - Assess pain using appropriate pain scale  - Administer analgesics based on type and severity of pain and evaluate response  - Implement non-pharmacological measures as appropriate and evaluate response  - Consider cultural and social influences on pain and pain management  - Notify physician/advanced practitioner if interventions unsuccessful or patient reports new pain  Outcome: Progressing     Problem: INFECTION - ADULT  Goal: Absence or prevention of progression during hospitalization  Description: INTERVENTIONS:  - Assess and monitor for signs and symptoms of infection  - Monitor lab/diagnostic results  - Monitor all insertion sites, i.e. indwelling lines, tubes, and drains  - Monitor endotracheal if appropriate and nasal secretions for changes in amount and color  - [place] appropriate cooling/warming therapies per order  - Administer medications as ordered  - Instruct and encourage patient and family to use good hand hygiene technique  - Identify and instruct in appropriate isolation precautions for identified infection/condition  Outcome: Progressing  Goal: Absence of fever/infection during neutropenic period  Description: INTERVENTIONS:  - Monitor WBC    Outcome: Progressing     Problem: SAFETY ADULT  Goal: Patient will remain free of falls  Description: INTERVENTIONS:  - Educate patient/family on patient safety including physical limitations  - Instruct patient to call for assistance with activity   - Consult OT/PT to assist with strengthening/mobility   - Keep Call bell within reach  - Keep bed low and locked with side rails adjusted as appropriate  - Keep care items and personal belongings within reach  - Initiate and maintain comfort rounds  - Make Fall Risk Sign visible to staff  - Offer Toileting every  Hours,  in advance of need  - Initiate/Maintain alarm  - Obtain necessary fall risk management equipment:   - Apply yellow socks and bracelet for high fall risk patients  - Consider moving patient to room near nurses station  Outcome: Progressing  Goal: Maintain or return to baseline ADL function  Description: INTERVENTIONS:  -  Assess patient's ability to carry out ADLs; assess patient's baseline for ADL function and identify physical deficits which impact ability to perform ADLs (bathing, care of mouth/teeth, toileting, grooming, dressing, etc.)  - Assess/evaluate cause of self-care deficits   - Assess range of motion  - Assess patient's mobility; develop plan if impaired  - Assess patient's need for assistive devices and provide as appropriate  - Encourage maximum independence but intervene and supervise when necessary  - Involve family in performance of ADLs  - Assess for home care needs following discharge   - Consider OT consult to assist with ADL evaluation and planning for discharge  - Provide patient education as appropriate  Outcome: Progressing  Goal: Maintains/Returns to pre admission functional level  Description: INTERVENTIONS:  - Perform AM-PAC 6 Click Basic Mobility/ Daily Activity assessment daily.  - Set and communicate daily mobility goal to care team and patient/family/caregiver.   - Collaborate with rehabilitation services on mobility goals if consulted  - Perform Range of Motion  times a day.  - Reposition patient every  hours.  - Dangle patient  times a day  - Stand patient  times a day  - Ambulate patient  times a day  - Out of bed to chair  times a day   - Out of bed for meals  times a day  - Out of bed for toileting  - Record patient progress and toleration of activity level   Outcome: Progressing     Problem: DISCHARGE PLANNING  Goal: Discharge to home or other facility with appropriate resources  Description: INTERVENTIONS:  - Identify barriers to discharge w/patient and caregiver  - Arrange for  needed discharge resources and transportation as appropriate  - Identify discharge learning needs (meds, wound care, etc.)  - Arrange for interpretive services to assist at discharge as needed  - Refer to Case Management Department for coordinating discharge planning if the patient needs post-hospital services based on physician/advanced practitioner order or complex needs related to functional status, cognitive ability, or social support system  Outcome: Progressing     Problem: Knowledge Deficit  Goal: Patient/family/caregiver demonstrates understanding of disease process, treatment plan, medications, and discharge instructions  Description: Complete learning assessment and assess knowledge base.  Interventions:  - Provide teaching at level of understanding  - Provide teaching via preferred learning methods  Outcome: Progressing     Problem: RESPIRATORY - ADULT  Goal: Achieves optimal ventilation and oxygenation  Description: INTERVENTIONS:  - Assess for changes in respiratory status  - Assess for changes in mentation and behavior  - Position to facilitate oxygenation and minimize respiratory effort  - Oxygen administered by appropriate delivery if ordered  - Initiate smoking cessation education as indicated  - Encourage broncho-pulmonary hygiene including cough, deep breathe, Incentive Spirometry  - Assess the need for suctioning and aspirate as needed  - Assess and instruct to report SOB or any respiratory difficulty  - Respiratory Therapy support as indicated  Outcome: Progressing     Problem: MUSCULOSKELETAL - ADULT  Goal: Maintain or return mobility to safest level of function  Description: INTERVENTIONS:  - Assess patient's ability to carry out ADLs; assess patient's baseline for ADL function and identify physical deficits which impact ability to perform ADLs (bathing, care of mouth/teeth, toileting, grooming, dressing, etc.)  - Assess/evaluate cause of self-care deficits   - Assess range of motion  - Assess  patient's mobility  - Assess patient's need for assistive devices and provide as appropriate  - Encourage maximum independence but intervene and supervise when necessary  - Involve family in performance of ADLs  - Assess for home care needs following discharge   - Consider OT consult to assist with ADL evaluation and planning for discharge  - Provide patient education as appropriate  Outcome: Progressing  Goal: Maintain proper alignment of affected body part  Description: INTERVENTIONS:  - Support, maintain and protect limb and body alignment  - Provide patient/ family with appropriate education  Outcome: Progressing     Problem: Nutrition/Hydration-ADULT  Goal: Nutrient/Hydration intake appropriate for improving, restoring or maintaining nutritional needs  Description: Monitor and assess patient's nutrition/hydration status for malnutrition. Collaborate with interdisciplinary team and initiate plan and interventions as ordered.  Monitor patient's weight and dietary intake as ordered or per policy. Utilize nutrition screening tool and intervene as necessary. Determine patient's food preferences and provide high-protein, high-caloric foods as appropriate.     INTERVENTIONS:  - Monitor oral intake, urinary output, labs, and treatment plans  - Assess nutrition and hydration status and recommend course of action  - Evaluate amount of meals eaten  - Assist patient with eating if necessary   - Allow adequate time for meals  - Recommend/ encourage appropriate diets, oral nutritional supplements, and vitamin/mineral supplements  - Order, calculate, and assess calorie counts as needed  - Recommend, monitor, and adjust tube feedings and TPN/PPN based on assessed needs  - Assess need for intravenous fluids  - Provide specific nutrition/hydration education as appropriate  - Include patient/family/caregiver in decisions related to nutrition  Outcome: Progressing     Problem: Prexisting or High Potential for Compromised Skin  Integrity  Goal: Skin integrity is maintained or improved  Description: INTERVENTIONS:  - Identify patients at risk for skin breakdown  - Assess and monitor skin integrity  - Assess and monitor nutrition and hydration status  - Monitor labs   - Assess for incontinence   - Turn and reposition patient  - Assist with mobility/ambulation  - Relieve pressure over bony prominences  - Avoid friction and shearing  - Provide appropriate hygiene as needed including keeping skin clean and dry  - Evaluate need for skin moisturizer/barrier cream  - Collaborate with interdisciplinary team   - Patient/family teaching  - Consider wound care consult   Outcome: Progressing

## 2024-12-11 NOTE — PLAN OF CARE
Problem: PAIN - ADULT  Goal: Verbalizes/displays adequate comfort level or baseline comfort level  Description: Interventions:  - Encourage patient to monitor pain and request assistance  - Assess pain using appropriate pain scale  - Administer analgesics based on type and severity of pain and evaluate response  - Implement non-pharmacological measures as appropriate and evaluate response  - Consider cultural and social influences on pain and pain management  - Notify physician/advanced practitioner if interventions unsuccessful or patient reports new pain  Outcome: Progressing     Problem: INFECTION - ADULT  Goal: Absence or prevention of progression during hospitalization  Description: INTERVENTIONS:  - Assess and monitor for signs and symptoms of infection  - Monitor lab/diagnostic results  - Monitor all insertion sites, i.e. indwelling lines, tubes, and drains  - Monitor endotracheal if appropriate and nasal secretions for changes in amount and color  - [place] appropriate cooling/warming therapies per order  - Administer medications as ordered  - Instruct and encourage patient and family to use good hand hygiene technique  - Identify and instruct in appropriate isolation precautions for identified infection/condition  Outcome: Progressing  Goal: Absence of fever/infection during neutropenic period  Description: INTERVENTIONS:  - Monitor WBC    Outcome: Progressing     Problem: SAFETY ADULT  Goal: Patient will remain free of falls  Description: INTERVENTIONS:  - Educate patient/family on patient safety including physical limitations  - Instruct patient to call for assistance with activity   - Consult OT/PT to assist with strengthening/mobility   - Keep Call bell within reach  - Keep bed low and locked with side rails adjusted as appropriate  - Keep care items and personal belongings within reach  - Initiate and maintain comfort rounds  - Make Fall Risk Sign visible to staff  - Offer Toileting every  Hours,  in advance of need  - Initiate/Maintain alarm  - Obtain necessary fall risk management equipment:   - Apply yellow socks and bracelet for high fall risk patients  - Consider moving patient to room near nurses station  Outcome: Progressing  Goal: Maintain or return to baseline ADL function  Description: INTERVENTIONS:  -  Assess patient's ability to carry out ADLs; assess patient's baseline for ADL function and identify physical deficits which impact ability to perform ADLs (bathing, care of mouth/teeth, toileting, grooming, dressing, etc.)  - Assess/evaluate cause of self-care deficits   - Assess range of motion  - Assess patient's mobility; develop plan if impaired  - Assess patient's need for assistive devices and provide as appropriate  - Encourage maximum independence but intervene and supervise when necessary  - Involve family in performance of ADLs  - Assess for home care needs following discharge   - Consider OT consult to assist with ADL evaluation and planning for discharge  - Provide patient education as appropriate  Outcome: Progressing  Goal: Maintains/Returns to pre admission functional level  Description: INTERVENTIONS:  - Perform AM-PAC 6 Click Basic Mobility/ Daily Activity assessment daily.  - Set and communicate daily mobility goal to care team and patient/family/caregiver.   - Collaborate with rehabilitation services on mobility goals if consulted  - Perform Range of Motion  times a day.  - Reposition patient every  hours.  - Dangle patient  times a day  - Stand patient  times a day  - Ambulate patient  times a day  - Out of bed to chair  times a day   - Out of bed for meals  times a day  - Out of bed for toileting  - Record patient progress and toleration of activity level   Outcome: Progressing     Problem: DISCHARGE PLANNING  Goal: Discharge to home or other facility with appropriate resources  Description: INTERVENTIONS:  - Identify barriers to discharge w/patient and caregiver  - Arrange for  needed discharge resources and transportation as appropriate  - Identify discharge learning needs (meds, wound care, etc.)  - Arrange for interpretive services to assist at discharge as needed  - Refer to Case Management Department for coordinating discharge planning if the patient needs post-hospital services based on physician/advanced practitioner order or complex needs related to functional status, cognitive ability, or social support system  Outcome: Progressing     Problem: Knowledge Deficit  Goal: Patient/family/caregiver demonstrates understanding of disease process, treatment plan, medications, and discharge instructions  Description: Complete learning assessment and assess knowledge base.  Interventions:  - Provide teaching at level of understanding  - Provide teaching via preferred learning methods  Outcome: Progressing     Problem: RESPIRATORY - ADULT  Goal: Achieves optimal ventilation and oxygenation  Description: INTERVENTIONS:  - Assess for changes in respiratory status  - Assess for changes in mentation and behavior  - Position to facilitate oxygenation and minimize respiratory effort  - Oxygen administered by appropriate delivery if ordered  - Initiate smoking cessation education as indicated  - Encourage broncho-pulmonary hygiene including cough, deep breathe, Incentive Spirometry  - Assess the need for suctioning and aspirate as needed  - Assess and instruct to report SOB or any respiratory difficulty  - Respiratory Therapy support as indicated  Outcome: Progressing     Problem: MUSCULOSKELETAL - ADULT  Goal: Maintain or return mobility to safest level of function  Description: INTERVENTIONS:  - Assess patient's ability to carry out ADLs; assess patient's baseline for ADL function and identify physical deficits which impact ability to perform ADLs (bathing, care of mouth/teeth, toileting, grooming, dressing, etc.)  - Assess/evaluate cause of self-care deficits   - Assess range of motion  - Assess  patient's mobility  - Assess patient's need for assistive devices and provide as appropriate  - Encourage maximum independence but intervene and supervise when necessary  - Involve family in performance of ADLs  - Assess for home care needs following discharge   - Consider OT consult to assist with ADL evaluation and planning for discharge  - Provide patient education as appropriate  Outcome: Progressing  Goal: Maintain proper alignment of affected body part  Description: INTERVENTIONS:  - Support, maintain and protect limb and body alignment  - Provide patient/ family with appropriate education  Outcome: Progressing     Problem: Nutrition/Hydration-ADULT  Goal: Nutrient/Hydration intake appropriate for improving, restoring or maintaining nutritional needs  Description: Monitor and assess patient's nutrition/hydration status for malnutrition. Collaborate with interdisciplinary team and initiate plan and interventions as ordered.  Monitor patient's weight and dietary intake as ordered or per policy. Utilize nutrition screening tool and intervene as necessary. Determine patient's food preferences and provide high-protein, high-caloric foods as appropriate.     INTERVENTIONS:  - Monitor oral intake, urinary output, labs, and treatment plans  - Assess nutrition and hydration status and recommend course of action  - Evaluate amount of meals eaten  - Assist patient with eating if necessary   - Allow adequate time for meals  - Recommend/ encourage appropriate diets, oral nutritional supplements, and vitamin/mineral supplements  - Order, calculate, and assess calorie counts as needed  - Recommend, monitor, and adjust tube feedings and TPN/PPN based on assessed needs  - Assess need for intravenous fluids  - Provide specific nutrition/hydration education as appropriate  - Include patient/family/caregiver in decisions related to nutrition  Outcome: Progressing     Problem: Prexisting or High Potential for Compromised Skin  Integrity  Goal: Skin integrity is maintained or improved  Description: INTERVENTIONS:  - Identify patients at risk for skin breakdown  - Assess and monitor skin integrity  - Assess and monitor nutrition and hydration status  - Monitor labs   - Assess for incontinence   - Turn and reposition patient  - Assist with mobility/ambulation  - Relieve pressure over bony prominences  - Avoid friction and shearing  - Provide appropriate hygiene as needed including keeping skin clean and dry  - Evaluate need for skin moisturizer/barrier cream  - Collaborate with interdisciplinary team   - Patient/family teaching  - Consider wound care consult   Outcome: Progressing

## 2024-12-12 VITALS
RESPIRATION RATE: 18 BRPM | SYSTOLIC BLOOD PRESSURE: 151 MMHG | HEART RATE: 55 BPM | HEIGHT: 70 IN | TEMPERATURE: 97.7 F | BODY MASS INDEX: 30.33 KG/M2 | DIASTOLIC BLOOD PRESSURE: 72 MMHG | OXYGEN SATURATION: 97 % | WEIGHT: 211.86 LBS

## 2024-12-12 LAB
ANION GAP SERPL CALCULATED.3IONS-SCNC: 5 MMOL/L (ref 4–13)
BUN SERPL-MCNC: 58 MG/DL (ref 5–25)
CALCIUM SERPL-MCNC: 9.1 MG/DL (ref 8.4–10.2)
CHLORIDE SERPL-SCNC: 92 MMOL/L (ref 96–108)
CO2 SERPL-SCNC: 37 MMOL/L (ref 21–32)
CREAT SERPL-MCNC: 2.01 MG/DL (ref 0.6–1.3)
ERYTHROCYTE [DISTWIDTH] IN BLOOD BY AUTOMATED COUNT: 15.3 % (ref 11.6–15.1)
GFR SERPL CREATININE-BSD FRML MDRD: 28 ML/MIN/1.73SQ M
GLUCOSE SERPL-MCNC: 181 MG/DL (ref 65–140)
GLUCOSE SERPL-MCNC: 229 MG/DL (ref 65–140)
GLUCOSE SERPL-MCNC: 232 MG/DL (ref 65–140)
HCT VFR BLD AUTO: 37.3 % (ref 36.5–49.3)
HGB BLD-MCNC: 11.5 G/DL (ref 12–17)
MAGNESIUM SERPL-MCNC: 2.1 MG/DL (ref 1.9–2.7)
MCH RBC QN AUTO: 30.3 PG (ref 26.8–34.3)
MCHC RBC AUTO-ENTMCNC: 30.8 G/DL (ref 31.4–37.4)
MCV RBC AUTO: 98 FL (ref 82–98)
PLATELET # BLD AUTO: 137 THOUSANDS/UL (ref 149–390)
PMV BLD AUTO: 10.9 FL (ref 8.9–12.7)
POTASSIUM SERPL-SCNC: 3.9 MMOL/L (ref 3.5–5.3)
RBC # BLD AUTO: 3.8 MILLION/UL (ref 3.88–5.62)
SODIUM SERPL-SCNC: 134 MMOL/L (ref 135–147)
WBC # BLD AUTO: 6.04 THOUSAND/UL (ref 4.31–10.16)

## 2024-12-12 PROCEDURE — 99239 HOSP IP/OBS DSCHRG MGMT >30: CPT | Performed by: NURSE PRACTITIONER

## 2024-12-12 PROCEDURE — 83735 ASSAY OF MAGNESIUM: CPT | Performed by: NURSE PRACTITIONER

## 2024-12-12 PROCEDURE — 99232 SBSQ HOSP IP/OBS MODERATE 35: CPT | Performed by: INTERNAL MEDICINE

## 2024-12-12 PROCEDURE — 82948 REAGENT STRIP/BLOOD GLUCOSE: CPT

## 2024-12-12 PROCEDURE — 85027 COMPLETE CBC AUTOMATED: CPT | Performed by: NURSE PRACTITIONER

## 2024-12-12 PROCEDURE — 80048 BASIC METABOLIC PNL TOTAL CA: CPT | Performed by: NURSE PRACTITIONER

## 2024-12-12 RX ORDER — METOPROLOL SUCCINATE 50 MG/1
50 TABLET, EXTENDED RELEASE ORAL DAILY
Qty: 30 TABLET | Refills: 0
Start: 2024-12-13 | End: 2024-12-24

## 2024-12-12 RX ORDER — BUMETANIDE 2 MG/1
2 TABLET ORAL 2 TIMES DAILY
Qty: 60 TABLET | Refills: 0
Start: 2024-12-12 | End: 2024-12-24

## 2024-12-12 RX ORDER — NIFEDIPINE 30 MG/1
60 TABLET, EXTENDED RELEASE ORAL DAILY
Qty: 60 TABLET | Refills: 0
Start: 2024-12-13 | End: 2024-12-24

## 2024-12-12 RX ORDER — ACETAMINOPHEN 325 MG/1
650 TABLET ORAL EVERY 6 HOURS PRN
Status: DISCONTINUED | OUTPATIENT
Start: 2024-12-12 | End: 2024-12-12 | Stop reason: HOSPADM

## 2024-12-12 RX ADMIN — METOPROLOL SUCCINATE 50 MG: 50 TABLET, EXTENDED RELEASE ORAL at 09:04

## 2024-12-12 RX ADMIN — ACETAMINOPHEN 650 MG: 325 TABLET, FILM COATED ORAL at 09:03

## 2024-12-12 RX ADMIN — INSULIN LISPRO 1 UNITS: 100 INJECTION, SOLUTION INTRAVENOUS; SUBCUTANEOUS at 09:06

## 2024-12-12 RX ADMIN — LISINOPRIL 20 MG: 20 TABLET ORAL at 09:04

## 2024-12-12 RX ADMIN — GABAPENTIN 100 MG: 100 CAPSULE ORAL at 09:04

## 2024-12-12 RX ADMIN — ASPIRIN 81 MG: 81 TABLET, COATED ORAL at 09:04

## 2024-12-12 RX ADMIN — APIXABAN 2.5 MG: 2.5 TABLET, FILM COATED ORAL at 09:04

## 2024-12-12 RX ADMIN — BUMETANIDE 2 MG: 1 TABLET ORAL at 09:04

## 2024-12-12 RX ADMIN — DOCUSATE SODIUM 100 MG: 100 CAPSULE, LIQUID FILLED ORAL at 09:04

## 2024-12-12 RX ADMIN — NIFEDIPINE 60 MG: 30 TABLET, FILM COATED, EXTENDED RELEASE ORAL at 09:04

## 2024-12-12 RX ADMIN — OMEGA-3 FATTY ACIDS CAP 1000 MG 1000 MG: 1000 CAP at 09:04

## 2024-12-12 RX ADMIN — Medication 1000 UNITS: at 09:04

## 2024-12-12 RX ADMIN — INSULIN LISPRO 3 UNITS: 100 INJECTION, SOLUTION INTRAVENOUS; SUBCUTANEOUS at 12:34

## 2024-12-12 RX ADMIN — FINASTERIDE 5 MG: 5 TABLET, FILM COATED ORAL at 09:04

## 2024-12-12 NOTE — PLAN OF CARE
Problem: PAIN - ADULT  Goal: Verbalizes/displays adequate comfort level or baseline comfort level  Description: Interventions:  - Encourage patient to monitor pain and request assistance  - Assess pain using appropriate pain scale  - Administer analgesics based on type and severity of pain and evaluate response  - Implement non-pharmacological measures as appropriate and evaluate response  - Consider cultural and social influences on pain and pain management  - Notify physician/advanced practitioner if interventions unsuccessful or patient reports new pain  Outcome: Progressing     Problem: INFECTION - ADULT  Goal: Absence or prevention of progression during hospitalization  Description: INTERVENTIONS:  - Assess and monitor for signs and symptoms of infection  - Monitor lab/diagnostic results  - Monitor all insertion sites, i.e. indwelling lines, tubes, and drains  - Monitor endotracheal if appropriate and nasal secretions for changes in amount and color  - [place] appropriate cooling/warming therapies per order  - Administer medications as ordered  - Instruct and encourage patient and family to use good hand hygiene technique  - Identify and instruct in appropriate isolation precautions for identified infection/condition  Outcome: Progressing  Goal: Absence of fever/infection during neutropenic period  Description: INTERVENTIONS:  - Monitor WBC    Outcome: Progressing     Problem: SAFETY ADULT  Goal: Patient will remain free of falls  Description: INTERVENTIONS:  - Educate patient/family on patient safety including physical limitations  - Instruct patient to call for assistance with activity   - Consult OT/PT to assist with strengthening/mobility   - Keep Call bell within reach  - Keep bed low and locked with side rails adjusted as appropriate  - Keep care items and personal belongings within reach  - Initiate and maintain comfort rounds  - Make Fall Risk Sign visible to staff  - Offer Toileting every 2 Hours,  in advance of need  - Initiate/Maintain bed alarm  - Obtain necessary fall risk management equipment  - Apply yellow socks and bracelet for high fall risk patients  - Consider moving patient to room near nurses station  Outcome: Progressing  Goal: Maintain or return to baseline ADL function  Description: INTERVENTIONS:  -  Assess patient's ability to carry out ADLs; assess patient's baseline for ADL function and identify physical deficits which impact ability to perform ADLs (bathing, care of mouth/teeth, toileting, grooming, dressing, etc.)  - Assess/evaluate cause of self-care deficits   - Assess range of motion  - Assess patient's mobility; develop plan if impaired  - Assess patient's need for assistive devices and provide as appropriate  - Encourage maximum independence but intervene and supervise when necessary  - Involve family in performance of ADLs  - Assess for home care needs following discharge   - Consider OT consult to assist with ADL evaluation and planning for discharge  - Provide patient education as appropriate  Outcome: Progressing  Goal: Maintains/Returns to pre admission functional level  Description: INTERVENTIONS:  - Perform AM-PAC 6 Click Basic Mobility/ Daily Activity assessment daily.  - Set and communicate daily mobility goal to care team and patient/family/caregiver.   - Collaborate with rehabilitation services on mobility goals if consulted  - Perform Range of Motion 3 times a day.  - Reposition patient every 3 hours.  - Dangle patient 3 times a day  - Stand patient 3 times a day  - Ambulate patient 3 times a day  - Out of bed to chair 3 times a day   - Out of bed for meals 3 times a day  - Out of bed for toileting  - Record patient progress and toleration of activity level   Outcome: Progressing     Problem: DISCHARGE PLANNING  Goal: Discharge to home or other facility with appropriate resources  Description: INTERVENTIONS:  - Identify barriers to discharge w/patient and caregiver  -  Arrange for needed discharge resources and transportation as appropriate  - Identify discharge learning needs (meds, wound care, etc.)  - Arrange for interpretive services to assist at discharge as needed  - Refer to Case Management Department for coordinating discharge planning if the patient needs post-hospital services based on physician/advanced practitioner order or complex needs related to functional status, cognitive ability, or social support system  Outcome: Progressing     Problem: Knowledge Deficit  Goal: Patient/family/caregiver demonstrates understanding of disease process, treatment plan, medications, and discharge instructions  Description: Complete learning assessment and assess knowledge base.  Interventions:  - Provide teaching at level of understanding  - Provide teaching via preferred learning methods  Outcome: Progressing     Problem: RESPIRATORY - ADULT  Goal: Achieves optimal ventilation and oxygenation  Description: INTERVENTIONS:  - Assess for changes in respiratory status  - Assess for changes in mentation and behavior  - Position to facilitate oxygenation and minimize respiratory effort  - Oxygen administered by appropriate delivery if ordered  - Initiate smoking cessation education as indicated  - Encourage broncho-pulmonary hygiene including cough, deep breathe, Incentive Spirometry  - Assess the need for suctioning and aspirate as needed  - Assess and instruct to report SOB or any respiratory difficulty  - Respiratory Therapy support as indicated  Outcome: Progressing     Problem: MUSCULOSKELETAL - ADULT  Goal: Maintain or return mobility to safest level of function  Description: INTERVENTIONS:  - Assess patient's ability to carry out ADLs; assess patient's baseline for ADL function and identify physical deficits which impact ability to perform ADLs (bathing, care of mouth/teeth, toileting, grooming, dressing, etc.)  - Assess/evaluate cause of self-care deficits   - Assess range of  motion  - Assess patient's mobility  - Assess patient's need for assistive devices and provide as appropriate  - Encourage maximum independence but intervene and supervise when necessary  - Involve family in performance of ADLs  - Assess for home care needs following discharge   - Consider OT consult to assist with ADL evaluation and planning for discharge  - Provide patient education as appropriate  Outcome: Progressing  Goal: Maintain proper alignment of affected body part  Description: INTERVENTIONS:  - Support, maintain and protect limb and body alignment  - Provide patient/ family with appropriate education  Outcome: Progressing     Problem: Nutrition/Hydration-ADULT  Goal: Nutrient/Hydration intake appropriate for improving, restoring or maintaining nutritional needs  Description: Monitor and assess patient's nutrition/hydration status for malnutrition. Collaborate with interdisciplinary team and initiate plan and interventions as ordered.  Monitor patient's weight and dietary intake as ordered or per policy. Utilize nutrition screening tool and intervene as necessary. Determine patient's food preferences and provide high-protein, high-caloric foods as appropriate.     INTERVENTIONS:  - Monitor oral intake, urinary output, labs, and treatment plans  - Assess nutrition and hydration status and recommend course of action  - Evaluate amount of meals eaten  - Assist patient with eating if necessary   - Allow adequate time for meals  - Recommend/ encourage appropriate diets, oral nutritional supplements, and vitamin/mineral supplements  - Order, calculate, and assess calorie counts as needed  - Recommend, monitor, and adjust tube feedings and TPN/PPN based on assessed needs  - Assess need for intravenous fluids  - Provide specific nutrition/hydration education as appropriate  - Include patient/family/caregiver in decisions related to nutrition  Outcome: Progressing     Problem: Prexisting or High Potential for  Compromised Skin Integrity  Goal: Skin integrity is maintained or improved  Description: INTERVENTIONS:  - Identify patients at risk for skin breakdown  - Assess and monitor skin integrity  - Assess and monitor nutrition and hydration status  - Monitor labs   - Assess for incontinence   - Turn and reposition patient  - Assist with mobility/ambulation  - Relieve pressure over bony prominences  - Avoid friction and shearing  - Provide appropriate hygiene as needed including keeping skin clean and dry  - Evaluate need for skin moisturizer/barrier cream  - Collaborate with interdisciplinary team   - Patient/family teaching  - Consider wound care consult   Outcome: Progressing

## 2024-12-12 NOTE — ASSESSMENT & PLAN NOTE
Lab Results   Component Value Date    EGFR 28 12/12/2024    EGFR 29 12/11/2024    EGFR 29 12/10/2024    CREATININE 2.01 (H) 12/12/2024    CREATININE 1.98 (H) 12/11/2024    CREATININE 1.97 (H) 12/10/2024     Baseline is 1.9-2.5; stable  Continue monitoring in the setting of IV diuretics.  Creatinine appears to be responding to IV diuresis  Within baseline continue close outpatient follow-up

## 2024-12-12 NOTE — DISCHARGE SUMMARY
Discharge Summary - Hospitalist   Name: Tom Stewart 89 y.o. male I MRN: [id number]  Unit/Bed#: 2 E 264-01 I Date of Admission: 12/6/2024   Date of Service: 12/12/2024 I Hospital Day: 6     Assessment & Plan  Acute on chronic heart failure with preserved ejection fraction (HCC)  Wt Readings from Last 3 Encounters:   12/11/24 96.1 kg (211 lb 13.8 oz)   12/02/24 96.6 kg (213 lb)   10/30/24 91.6 kg (201 lb 15.1 oz)     Lab Results   Component Value Date    BNP 2,615 (H) 12/06/2024      Secondary to due to fluid overload.  Patient presents with lower extremity edema and fluid extravasation from the skin.  Significant weight gain compared to last office visit 4 days ago.  Patient supposed to take Lasix 80 mg twice a day.  Patient lives in a facility, per son he is compliant with meds as well as diet.  Follows with cardiology and last visit on 12/2 patient was euvolemic.  Plan:  Monitor strict I's and O's and daily weight  Net neg. 4.6L. Wt 213 -4lbs   Cardiac diet with fluid restriction  Cardiology consulted  Transitioned IV to p.o. Bumex twice daily/11  [place] medication will be p.o. Bumex  Patient experiencing mild confusion likely due to diuresis vs hospital delirium   Echocardiogram LVEF now 35% with severe diastolic dysfunction.  Moderate to severe aortic stenosis mild to moderate mitral regurgitation.  EF is now reduced from 50% in 2023   Coronary artery disease involving native coronary artery of native heart without angina pectoris  History of ischemic heart disease.  S/p PCI in 2020.  Continue aspirin, metoprolol  Per cardiology notes patient allergic to statin  Paroxysmal atrial fibrillation (HCC)  Patient in A-fib with rate controlled on admission  Patient takes Eliquis 2.5 mg twice a day.   Continue metoprolol with hold parameters  Continue home regimen  Type 2 diabetes mellitus with stage 4 chronic kidney disease, without long-term current use of insulin (HCC)  Lab Results   Component Value Date     HGBA1C 8.5 (H) 12/06/2024       Recent Labs     12/11/24  1616 12/11/24  2116 12/12/24  0735 12/12/24  1113   POCGLU 219* 388* 181* 232*       Blood Sugar Average: Last 72 hrs:  (P) [id number]  History of diabetes patient takes glipizide.  No insulin.  Continue insulin sliding scale.  Monitor for Accu-Cheks  Hypoglycemia protocol and diabetic diet    Stage 4 chronic kidney disease (HCC)  Lab Results   Component Value Date    EGFR 28 12/12/2024    EGFR 29 12/11/2024    EGFR 29 12/10/2024    CREATININE 2.01 (H) 12/12/2024    CREATININE 1.98 (H) 12/11/2024    CREATININE 1.97 (H) 12/10/2024     Baseline is 1.9-2.5; stable  Continue monitoring in the setting of IV diuretics.  Creatinine appears to be responding to IV diuresis  Within baseline continue close outpatient follow-up  Primary hypertension  Medications adjusted by cardiology  Continue outpatient  Follow-up with cardiology outpatient  Other Alzheimer's disease (HCC)  Patient with baseline dementia.  On assessment pleasantly confused.  Moderate aortic stenosis  Echocardiogram showing moderate to severe aortic stenosis   Appreciate recommendations.  Acute respiratory failure with hypoxia (HCC)  Patient son reports that patient used to be off of oxygen 1 to lift at home and ever since moved to facility his being on 1 to 2 L.  Currently requiring 1 to 2 L on admission.  Currently on 2L with SpO2 at 98%  Chest x-ray with interstitial edema per my reading pending final radiology reading  continue with IV diuretics and hopefully wean patient off oxygen if possible.  If not patient may require home oxygen evaluation    CAD s/p PCI to LAD       Discharging Physician / Practitioner: JALEEL Healy  PCP: Beto Garnett DO  Admission Date:   Admission Orders (From admission, onward)       Ordered        12/06/24 1857  INPATIENT ADMISSION  Once                          Discharge Date: 12/12/24    Medical Problems       Resolved Problems  Date Reviewed:  12/2/2024   None         Consultations During Hospital Stay:  IP CONSULT TO CARDIOLOGY  IP CONSULT TO NUTRITION SERVICES    Procedures Performed:   XR chest pa and lateral  Result Date: 12/10/2024  Mild CHF. Small pleural effusions. Similar or slightly worse compared to the recent study. Workstation performed: [id number]     XR chest 1 view portable  Result Date: 12/7/2024  Moderate pulmonary venous congestion with trace pleural effusions. Workstation performed: [id number]         Significant Findings / Test Results:   See above    Incidental Findings:   None    Test Results Pending at Discharge (will require follow up):   None     Outpatient Tests Requested:  None    Complications: None    Past Medical History:   Diagnosis Date    Acute deep vein thrombosis (DVT) of brachial vein of left upper extremity (HCC) 11/24/2017    Acute deep vein thrombosis (DVT) of left peroneal vein ([facility])     Acute ST elevation myocardial infarction (HCC)     Aortic valve stenosis     Arthritis     Atrial fibrillation (HCC)     Basilar artery stenosis     Basilar artery stenosis     Basilar artery stenosis 05/04/2020    MRA Head wo contrast (12/13/2019)  IMPRESSION:   Multifocal intracranial atherosclerotic disease with moderate to severe stenosis at the origin of the left M1 segment with additional atherosclerotic disease noted in the distal right M1 and proximal inferior left M2 branches.   Moderate to severe stenosis in the proximal and mid basilar artery with nonvisualization of the right intradural vertebral    Benign prostatic hyperplasia with lower urinary tract symptoms     CAD (coronary artery disease)     CAD in native artery 11/24/2017    Underwent cardiac catheterization on 1/30/2020 and had mid and distal LAD stent placed  Cardiac PCI (1/30/2020)  SUMMARY   CORONARY CIRCULATION: Mid LAD: There was a 90 % stenosis at the site of a prior stent. Distal LAD: There was a 90 % stenosis at the site of a prior stent. Left posterior  descending artery: There was a diffuse 50 % stenosis.   1ST LESION INTERVENTIONS: A balloon angioplasty wit    Cerebrovascular small vessel disease 11/12/2020    Chronic kidney disease     Closed fracture of multiple ribs of left side with routine healing 09/03/2020    Coronary artery disease     Dementia (HCC)     Diabetes mellitus (HCC)     DM2 (diabetes mellitus, type 2) ([facility])     Elevated troponin 07/19/2021    Herpes zoster without complication 07/28/2021    History of CVA (cerebrovascular accident) 02/21/2019    History of DVT of peroneal vein left lower extremity 12/16/2019    History of transfusion     HLD (hyperlipidemia)     HTN (hypertension)     Infected sebaceous cyst of skin 06/08/2021    Lump in chest 06/01/2021    Middle cerebral artery stenosis     Mild to moderate aortic stenosis 10/09/2018    ECHO (7/2020)  AORTIC VALVE: Leaflets exhibited moderately increased thickness and moderate calcification. Transaortic velocity was increased due to valvular stenosis. There was mild to moderate stenosis. RICHY 1.4cm, mean pressure gradient 11mmHg, peak velocity 2.15m/s There was mild regurgitation.    Myocardial infarction ([facility])     Near syncope 07/19/2021    Other proteinuria 10/08/2019    Proteinuria     Rib fractures (right) 07/31/2020    Stroke ([facility])     Symptomatic bradycardia     Transient cerebral ischemia     Vitamin D deficiency        Reason for Admission: Leg Swelling (Pt c/o bilateral leg swelling x 3 days, pt denies any pain )       Hospital Course:     Tom Stewart is a 89 y.o. male patient with past medical history of  has a past medical history of Acute deep vein thrombosis (DVT) of brachial vein of left upper extremity ([facility]), Acute deep vein thrombosis (DVT) of left peroneal vein ([facility]), Acute ST elevation myocardial infarction ([facility]), Aortic valve stenosis, Arthritis, Atrial fibrillation ([facility]), Basilar artery stenosis, Basilar artery stenosis, Basilar artery stenosis, Benign prostatic  "hyperplasia with lower urinary tract symptoms, CAD (coronary artery disease), CAD in native artery, Cerebrovascular small vessel disease, Chronic kidney disease, Closed fracture of multiple ribs of left side with routine healing, Coronary artery disease, Dementia (HCC), Diabetes mellitus (HCC), DM2 (diabetes mellitus, type 2) ([facility]), Elevated troponin, Herpes zoster without complication, History of CVA (cerebrovascular accident), History of DVT of peroneal vein left lower extremity, History of transfusion, HLD (hyperlipidemia), HTN (hypertension), Infected sebaceous cyst of skin, Lump in chest, Middle cerebral artery stenosis, Mild to moderate aortic stenosis, Myocardial infarction (HCC), Near syncope, Other proteinuria, Proteinuria, Rib fractures (right), Stroke ([facility]), Symptomatic bradycardia, Transient cerebral ischemia, and Vitamin D deficiency. who originally presented to the hospital on 12/6/2024 due to Leg Swelling (Pt c/o bilateral leg swelling x 3 days, pt denies any pain ) patient presented with swelling without have heart failure exacerbation was started on IV Bumex had significant diuresing and weight loss his respiratory status and swelling were improved to baseline he was monitored for 24 hours on p.o. Bumex continued to do well his renal function remained stable within his baseline and he is now ready for discharge back to [facility]    Please see above list of diagnoses and related plan for additional information.     Condition at Discharge: stable     Discharge Day Visit / Exam:     Subjective: Patient is back to his baseline mentation he is pleasantly confused he offers no acute complaints he is complaining of some chronic ankle pain however this is responded to Tylenol ready for discharge    Vitals: Blood Pressure: 151/72 (12/12/24 0737)  Pulse: 55 (12/12/24 0737)  Temperature: 97.7 °F (36.5 °C) (12/12/24 0737)  Temp Source: Oral (12/12/24 0737)  Respirations: 18 (12/12/24 0737)  Height: 5' 10\" (177.8 cm) " (12/08/24 1030)  Weight - Scale: 96.1 kg (211 lb 13.8 oz) (12/11/24 0600)  SpO2: 97 % (12/11/24 2226)  Exam:   Physical Exam  Vitals reviewed.   Constitutional:       General: He is not in acute distress.     Appearance: He is obese. He is ill-appearing.   Cardiovascular:      Rate and Rhythm: Normal rate.   Pulmonary:      Effort: Pulmonary effort is normal.   Abdominal:      General: Bowel sounds are normal.   Musculoskeletal:         General: Swelling present.   Skin:     Coloration: Skin is pale.      Findings: Lesion present.   Neurological:      General: No focal deficit present.      Mental Status: He is alert. Mental status is at baseline.       Discussion with Family: dtr    Discharge instructions/Information to patient and family:   See after visit summary for information provided to patient and family.      Provisions for Follow-Up Care:  See after visit summary for information related to follow-up care and any pertinent home health orders.      Disposition:     Home    Planned Readmission: no     Discharge Statement:  I spent 45 minutes discharging the patient. This time was spent on the day of discharge. I had direct contact with the patient on the day of discharge. Greater than 50% of the total time was spent examining patient, answering all patient questions, arranging and discussing plan of care with patient as well as directly providing post-discharge instructions.  Additional time then spent on discharge activities.    Discharge Medications:  See after visit summary for reconciled discharge medications provided to patient and family.      ** Please Note: This note has been constructed using a voice recognition system **

## 2024-12-12 NOTE — ASSESSMENT & PLAN NOTE
Lab Results   Component Value Date    EGFR 28 12/12/2024    EGFR 29 12/11/2024    EGFR 29 12/10/2024    CREATININE 2.01 (H) 12/12/2024    CREATININE 1.98 (H) 12/11/2024    CREATININE 1.97 (H) 12/10/2024   Management per primary service.  CKD presently stable.

## 2024-12-12 NOTE — ASSESSMENT & PLAN NOTE
Lab Results   Component Value Date    HGBA1C 8.5 (H) 12/06/2024       Recent Labs     12/11/24  1616 12/11/24  2116 12/12/24  0735 12/12/24  1113   POCGLU 219* 388* 181* 232*       Blood Sugar Average: Last 72 hrs:  (P) [id number]  History of diabetes patient takes glipizide.  No insulin.  Continue insulin sliding scale.  Monitor for Accu-Cheks  Hypoglycemia protocol and diabetic diet

## 2024-12-12 NOTE — TELEPHONE ENCOUNTER
----- Message from JALEEL Costa sent at 12/12/2024  3:56 PM EST -----  Regarding: Hospital Follow-up  Cardiology Follow-up:    Patient clinical visit in 3 day at the Cardio location: [place] office. .    Schedule visit with Card[facility] [place] Providers: first available provider.    Type of Visit: VISIT TYPE: in-person office visit.    Test ordered:     Additional Details: CHF

## 2024-12-12 NOTE — PLAN OF CARE
Problem: PAIN - ADULT  Goal: Verbalizes/displays adequate comfort level or baseline comfort level  Description: Interventions:  - Encourage patient to monitor pain and request assistance  - Assess pain using appropriate pain scale  - Administer analgesics based on type and severity of pain and evaluate response  - Implement non-pharmacological measures as appropriate and evaluate response  - Consider cultural and social influences on pain and pain management  - Notify physician/advanced practitioner if interventions unsuccessful or patient reports new pain  Outcome: Progressing     Problem: INFECTION - ADULT  Goal: Absence or prevention of progression during hospitalization  Description: INTERVENTIONS:  - Assess and monitor for signs and symptoms of infection  - Monitor lab/diagnostic results  - Monitor all insertion sites, i.e. indwelling lines, tubes, and drains  - Monitor endotracheal if appropriate and nasal secretions for changes in amount and color  - [place] appropriate cooling/warming therapies per order  - Administer medications as ordered  - Instruct and encourage patient and family to use good hand hygiene technique  - Identify and instruct in appropriate isolation precautions for identified infection/condition  Outcome: Progressing  Goal: Absence of fever/infection during neutropenic period  Description: INTERVENTIONS:  - Monitor WBC    Outcome: Progressing     Problem: SAFETY ADULT  Goal: Patient will remain free of falls  Description: INTERVENTIONS:  - Educate patient/family on patient safety including physical limitations  - Instruct patient to call for assistance with activity   - Consult OT/PT to assist with strengthening/mobility   - Keep Call bell within reach  - Keep bed low and locked with side rails adjusted as appropriate  - Keep care items and personal belongings within reach  - Initiate and maintain comfort rounds  - Make Fall Risk Sign visible to staff  - Offer Toileting every  Hours,  in advance of need  - Initiate/Maintain alarm  - Obtain necessary fall risk management equipment:   - Apply yellow socks and bracelet for high fall risk patients  - Consider moving patient to room near nurses station  Outcome: Progressing  Goal: Maintain or return to baseline ADL function  Description: INTERVENTIONS:  -  Assess patient's ability to carry out ADLs; assess patient's baseline for ADL function and identify physical deficits which impact ability to perform ADLs (bathing, care of mouth/teeth, toileting, grooming, dressing, etc.)  - Assess/evaluate cause of self-care deficits   - Assess range of motion  - Assess patient's mobility; develop plan if impaired  - Assess patient's need for assistive devices and provide as appropriate  - Encourage maximum independence but intervene and supervise when necessary  - Involve family in performance of ADLs  - Assess for home care needs following discharge   - Consider OT consult to assist with ADL evaluation and planning for discharge  - Provide patient education as appropriate  Outcome: Progressing  Goal: Maintains/Returns to pre admission functional level  Description: INTERVENTIONS:  - Perform AM-PAC 6 Click Basic Mobility/ Daily Activity assessment daily.  - Set and communicate daily mobility goal to care team and patient/family/caregiver.   - Collaborate with rehabilitation services on mobility goals if consulted  - Perform Range of Motion  times a day.  - Reposition patient every  hours.  - Dangle patient  times a day  - Stand patient  times a day  - Ambulate patient  times a day  - Out of bed to chair  times a day   - Out of bed for meals times a day  - Out of bed for toileting  - Record patient progress and toleration of activity level   Outcome: Progressing     Problem: DISCHARGE PLANNING  Goal: Discharge to home or other facility with appropriate resources  Description: INTERVENTIONS:  - Identify barriers to discharge w/patient and caregiver  - Arrange for  needed discharge resources and transportation as appropriate  - Identify discharge learning needs (meds, wound care, etc.)  - Arrange for interpretive services to assist at discharge as needed  - Refer to Case Management Department for coordinating discharge planning if the patient needs post-hospital services based on physician/advanced practitioner order or complex needs related to functional status, cognitive ability, or social support system  Outcome: Progressing     Problem: Knowledge Deficit  Goal: Patient/family/caregiver demonstrates understanding of disease process, treatment plan, medications, and discharge instructions  Description: Complete learning assessment and assess knowledge base.  Interventions:  - Provide teaching at level of understanding  - Provide teaching via preferred learning methods  Outcome: Progressing     Problem: RESPIRATORY - ADULT  Goal: Achieves optimal ventilation and oxygenation  Description: INTERVENTIONS:  - Assess for changes in respiratory status  - Assess for changes in mentation and behavior  - Position to facilitate oxygenation and minimize respiratory effort  - Oxygen administered by appropriate delivery if ordered  - Initiate smoking cessation education as indicated  - Encourage broncho-pulmonary hygiene including cough, deep breathe, Incentive Spirometry  - Assess the need for suctioning and aspirate as needed  - Assess and instruct to report SOB or any respiratory difficulty  - Respiratory Therapy support as indicated  Outcome: Progressing     Problem: MUSCULOSKELETAL - ADULT  Goal: Maintain or return mobility to safest level of function  Description: INTERVENTIONS:  - Assess patient's ability to carry out ADLs; assess patient's baseline for ADL function and identify physical deficits which impact ability to perform ADLs (bathing, care of mouth/teeth, toileting, grooming, dressing, etc.)  - Assess/evaluate cause of self-care deficits   - Assess range of motion  - Assess  patient's mobility  - Assess patient's need for assistive devices and provide as appropriate  - Encourage maximum independence but intervene and supervise when necessary  - Involve family in performance of ADLs  - Assess for home care needs following discharge   - Consider OT consult to assist with ADL evaluation and planning for discharge  - Provide patient education as appropriate  Outcome: Progressing  Goal: Maintain proper alignment of affected body part  Description: INTERVENTIONS:  - Support, maintain and protect limb and body alignment  - Provide patient/ family with appropriate education  Outcome: Progressing     Problem: Nutrition/Hydration-ADULT  Goal: Nutrient/Hydration intake appropriate for improving, restoring or maintaining nutritional needs  Description: Monitor and assess patient's nutrition/hydration status for malnutrition. Collaborate with interdisciplinary team and initiate plan and interventions as ordered.  Monitor patient's weight and dietary intake as ordered or per policy. Utilize nutrition screening tool and intervene as necessary. Determine patient's food preferences and provide high-protein, high-caloric foods as appropriate.     INTERVENTIONS:  - Monitor oral intake, urinary output, labs, and treatment plans  - Assess nutrition and hydration status and recommend course of action  - Evaluate amount of meals eaten  - Assist patient with eating if necessary   - Allow adequate time for meals  - Recommend/ encourage appropriate diets, oral nutritional supplements, and vitamin/mineral supplements  - Order, calculate, and assess calorie counts as needed  - Recommend, monitor, and adjust tube feedings and TPN/PPN based on assessed needs  - Assess need for intravenous fluids  - Provide specific nutrition/hydration education as appropriate  - Include patient/family/caregiver in decisions related to nutrition  Outcome: Progressing     Problem: Prexisting or High Potential for Compromised Skin  Integrity  Goal: Skin integrity is maintained or improved  Description: INTERVENTIONS:  - Identify patients at risk for skin breakdown  - Assess and monitor skin integrity  - Assess and monitor nutrition and hydration status  - Monitor labs   - Assess for incontinence   - Turn and reposition patient  - Assist with mobility/ambulation  - Relieve pressure over bony prominences  - Avoid friction and shearing  - Provide appropriate hygiene as needed including keeping skin clean and dry  - Evaluate need for skin moisturizer/barrier cream  - Collaborate with interdisciplinary team   - Patient/family teaching  - Consider wound care consult   Outcome: Progressing

## 2024-12-12 NOTE — ASSESSMENT & PLAN NOTE
Wt Readings from Last 3 Encounters:   12/11/24 96.1 kg (211 lb 13.8 oz)   12/02/24 96.6 kg (213 lb)   10/30/24 91.6 kg (201 lb 15.1 oz)     Lab Results   Component Value Date    BNP 2,615 (H) 12/06/2024      Secondary to due to fluid overload.  Patient presents with lower extremity edema and fluid extravasation from the skin.  Significant weight gain compared to last office visit 4 days ago.  Patient supposed to take Lasix 80 mg twice a day.  Patient lives in a facility, per son he is compliant with meds as well as diet.  Follows with cardiology and last visit on 12/2 patient was euvolemic.  Plan:  Monitor strict I's and O's and daily weight  Net neg. 4.6L. Wt 213 -4lbs   Cardiac diet with fluid restriction  Cardiology consulted  Transitioned IV to p.o. Bumex twice daily/11  [place] medication will be p.o. Bumex  Patient experiencing mild confusion likely due to diuresis vs hospital delirium   Echocardiogram LVEF now 35% with severe diastolic dysfunction.  Moderate to severe aortic stenosis mild to moderate mitral regurgitation.  EF is now reduced from 50% in 2023

## 2024-12-12 NOTE — CASE MANAGEMENT
Case Management Discharge Planning Note    Patient name Tom Stewart  Location 2 [facility] 264/2 E 264-01 MRN [id number]  : 1935 Date 2024       Current Admission Date: 2024  Current Admission Diagnosis:Acute on chronic heart failure with preserved ejection fraction (HCC)   Patient Active Problem List    Diagnosis Date Noted Date Diagnosed    CAD s/p PCI to LAD 2024     Encounter for delirium elderly at risk (DEAR) screening 10/28/2024     Frailty syndrome in geriatric patient 10/28/2024     Moderate aortic stenosis 10/28/2024     Acute respiratory failure with hypoxia (HCC) 10/25/2024     Sebaceous cyst 2024     Nose ulceration 2024     Primary hypertension 2023     Acute on chronic heart failure with preserved ejection fraction (HCC) 2023     Stage 4 chronic kidney disease (HCC) 2023     Gait instability 2022     Hearing loss 2022     Other Alzheimer's disease (HCC) 2022     Other proteinuria 2021     Vitamin D deficiency 2021     Ambulatory dysfunction 2021     Fall 2020     Type 2 diabetes mellitus with stage 4 chronic kidney disease, without long-term current use of insulin (HCC) 2019     Paroxysmal atrial fibrillation (HCC) 2019     History of CVA 2019     Mixed hyperlipidemia 2017     Coronary artery disease involving native coronary artery of native heart without angina pectoris 2017       LOS (days): 6  Geometric Mean LOS (GMLOS) (days): 3.9  Days to GMLOS:-1.8     OBJECTIVE:  Risk of Unplanned Readmission Score: 20.7      Current admission status: Inpatient   Preferred Pharmacy:   "[id number] - [place] - [address]  [address]  [place] [address]  Phone: [phone] Fax: [phone]    [facility] - [place], [place] - [address]  [address]  [facility] [address]  [facility] [address]  Phone: [phone] Fax:  [phone]    Primary Care Provider: Beto Garnett DO    Primary Insurance: MEDICARE  Secondary Insurance: [employer]    DISCHARGE DETAILS:    Discharge planning discussed with:: Dtr via phone.  Freedom of Choice: Yes  Comments - Freedom of Choice: FOC maintained - CM reviewed DCP.  Patient medically cleared for return to [facility] today.  Chanel Mendiola states family may want to send him to a different facility.  CM advised dtr that hospital cannot hold patient for LTC facility transfer, which can be a lengthy process.  Dtr encouraged to follow-up with [facility] for next steps if they are requesting transfer.  CM contacted family/caregiver?: Yes  Were Treatment Team discharge recommendations reviewed with patient/caregiver?: Yes  Did patient/caregiver verbalize understanding of patient care needs?: N/A- going to facility  Were patient/caregiver advised of the risks associated with not following Treatment Team discharge recommendations?: Yes    Contacts  Patient Contacts: Chanel (dtr)  Relationship to Patient:: Family  Contact Method: Phone  Phone Number: [phone]  Reason/Outcome: Continuity of Care, Discharge Planning    Requested Home Health Care         Is the patient interested in HHC at discharge?: No    Other Referral/Resources/Interventions Provided:  Interventions: Facility Return, Transportation  Referral Comments: Cleared for return to [facility].  Transport request via Roundtrip - BLS due to 2L O2.  Programs:: CHF (Facility - no OP CM referral or CHF WMP.)    Transport at Discharge : BLS Ambulance     Number[facility]Name [facility] Dis[facility] [phone]     ETA of Transport (Date): 12/12/24  ETA of Transport (Time): 1400  Number/Name of Dispatcher: [facility] [phone]  Transported by (Company and Unit #): [employer] [phone]    IMM Given (Date):: 12/12/24  IMM Given to:: Family (Verbal review of IMM with Chanel mendiola, via phone.  Understanding verbalized.  Questions answered.  Original to medical  records.)    Accepting Facility Name, City & State : [address] [address]  Receiving Facility/Agency Phone Number: Phone: [phone]  Facility/Agency Fax Number: Fax: [phone]

## 2024-12-12 NOTE — ASSESSMENT & PLAN NOTE
HR is well-controlled, transition to Toprol-XL 50 mg daily.  [id number] at least 7, continue Eliquis 2.5 mg bid (age, creatinine).

## 2024-12-12 NOTE — ASSESSMENT & PLAN NOTE
Wt Readings from Last 3 Encounters:   12/11/24 96.1 kg (211 lb 13.8 oz)   12/02/24 96.6 kg (213 lb)   10/30/24 91.6 kg (201 lb 15.1 oz)   Volume status has improved.    Patient was switched to Bumex 2 mg twice daily.  Renal function stable in the setting of CKD.  Recommend strict I/O's, daily weights, and sodium restriction.  Overall conservative management based on advanced age and multiple comorbidities as well as dementia and CKD.  Patient is DNR/DNI.  Present ejection fraction is 35% down from 50% 1 year ago.  Patient is a poor candidate for LifeVest given dementia.  Patient is a poor candidate for any invasive cardiac evaluation.  Consider further goals of care and palliative care consultation.  Discussed with hospitalist service.  Sign off.  Please reconsult as needed.  Left Ventricle: Left ventricular cavity size is normal. Wall thickness is normal. The left ventricular ejection fraction is 35%. Systolic function is moderately reduced. There is moderate global hypokinesis with regional variation. Diastolic function is severely abnormal, consistent with ungraded restrictive relaxation.    Right Ventricle: Right ventricular cavity size is dilated. Systolic function is normal.    Left Atrium: The atrium is dilated.    Right Atrium: The atrium is dilated.    Aortic Valve: The aortic valve is trileaflet. The leaflets are thickened and calcified with reduced mobility. There is mild regurgitation. There is moderate to severe stenosis.  Peak velocity was 3.22 m/s.  Peak and mean gradients across the valve were 42 and 26 mmHg respectively.  Aortic valve area by the continuity equation method was 0.79 cm².    Mitral Valve: There is mild annular calcification. There is mild to moderate regurgitation.    Tricuspid Valve: There is mild regurgitation. The right ventricular systolic pressure is mildly elevated. The estimated right ventricular systolic pressure is 46.00 mmHg.    Pulmonic Valve: There is trace  regurgitation.    IVC/SVC: The right atrial pressure is estimated at 15.0 mmHg. The inferior vena cava is dilated. Respirophasic changes were blunted (less than 50% variation).

## 2024-12-12 NOTE — ASSESSMENT & PLAN NOTE
Lab Results   Component Value Date    HGBA1C 8.5 (H) 12/06/2024     Recent Labs     12/11/24  1616 12/11/24  2116 12/12/24  0735 12/12/24  1113   POCGLU 219* 388* 181* 232*   Blood Sugar Average: Last 72 hrs:  (P) [id number]  Management per primary service.

## 2024-12-13 ENCOUNTER — TELEPHONE (OUTPATIENT)
Dept: CARDIOLOGY CLINIC | Facility: CLINIC | Age: 89
End: 2024-12-13

## 2024-12-13 ENCOUNTER — TRANSITIONAL CARE MANAGEMENT (OUTPATIENT)
Age: 89
End: 2024-12-13

## 2024-12-13 NOTE — TELEPHONE ENCOUNTER
Patient discharged from [facility].  Called daughter Chanel to schedule hospital follow up.    Self-management/education and teach back:  Primary learner: Daughter Chanel  Following low sodium diet: Patient is at [facility], cardiac diet with fluid restriction ordered  Following fluid restriction: At [facility], fluid restriction ordered  Hospital discharge weight: 211 lb 13.8 oz   Weighing daily:            Recordinst home weight       Weight today: Unknown - pt at [facility]  Monitoring symptoms: Yes  Any current symptoms: Daughter has not seen patient today, states her brother is with patient today  Knows when to call provider: Discussed  Medication reviewed and taking all as prescribed: At [facility]  Knows name of diuretic: No.  Made aware Lasix was discontinued and patient is now taking Bumex  Escalation plan:   HF education reviewed/reinforced including low sodium diet, 64 oz fluid restriction, activity, symptoms of decompensation and when and who to call. Advised daughter if her or her brother have concerns about patient, may call office to discuss.    Care Coordination:  Aware of cardiology follow up appointment: Scheduled during this call.  Advised daughter Saira requested in office visit this week.  Daughter declined all appointments offered prior to .  States her brother is looking into getting patient transferred to a different facility towards [facility] and will call to update us if patient cannot make  appointment.  Aware of PCP follow up appointment:  Transportation: needs transportation from [facility] to [facility]  Social Support:  Insurance/financial concerns: Cannot afford private pay nursing home  Home health care:   Health literacy: Needs reinforcement  Engagement: Good  Personal Goal:  Additional comments:

## 2024-12-18 ENCOUNTER — TELEPHONE (OUTPATIENT)
Age: 89
End: 2024-12-18

## 2024-12-18 ENCOUNTER — HOSPITAL ENCOUNTER (INPATIENT)
Facility: HOSPITAL | Age: 89
LOS: 5 days | Discharge: NURSING, REHAB OR LONG-TERM CARE | DRG: 291 | End: 2024-12-24
Admitting: INTERNAL MEDICINE
Payer: MEDICARE

## 2024-12-18 ENCOUNTER — APPOINTMENT (EMERGENCY)
Dept: RADIOLOGY | Facility: HOSPITAL | Age: 89
DRG: 291 | End: 2024-12-18
Payer: MEDICARE

## 2024-12-18 DIAGNOSIS — Z51.5 HOSPICE CARE PATIENT: ICD-10-CM

## 2024-12-18 DIAGNOSIS — R79.89 ELEVATED TROPONIN: ICD-10-CM

## 2024-12-18 DIAGNOSIS — Z71.89 GOALS OF CARE, COUNSELING/DISCUSSION: ICD-10-CM

## 2024-12-18 DIAGNOSIS — L03.90 WOUND CELLULITIS: ICD-10-CM

## 2024-12-18 DIAGNOSIS — I50.9 ACUTE EXACERBATION OF CHF (CONGESTIVE HEART FAILURE) (HCC): Primary | ICD-10-CM

## 2024-12-18 DIAGNOSIS — I50.33 ACUTE ON CHRONIC HEART FAILURE WITH PRESERVED EJECTION FRACTION (HCC): ICD-10-CM

## 2024-12-18 DIAGNOSIS — N18.4 STAGE 4 CHRONIC KIDNEY DISEASE (HCC): ICD-10-CM

## 2024-12-18 DIAGNOSIS — G30.8 OTHER ALZHEIMER'S DISEASE (HCC): ICD-10-CM

## 2024-12-18 DIAGNOSIS — F02.80 OTHER ALZHEIMER'S DISEASE (HCC): ICD-10-CM

## 2024-12-18 DIAGNOSIS — I48.0 PAROXYSMAL ATRIAL FIBRILLATION (HCC): ICD-10-CM

## 2024-12-18 LAB
2HR DELTA HS TROPONIN: -2 NG/L
4HR DELTA HS TROPONIN: -2 NG/L
ALBUMIN SERPL BCG-MCNC: 3.5 G/DL (ref 3.5–5)
ALP SERPL-CCNC: 58 U/L (ref 34–104)
ALT SERPL W P-5'-P-CCNC: 16 U/L (ref 7–52)
ANION GAP SERPL CALCULATED.3IONS-SCNC: 7 MMOL/L (ref 4–13)
AST SERPL W P-5'-P-CCNC: 14 U/L (ref 13–39)
BASOPHILS # BLD AUTO: 0.04 THOUSANDS/ÂΜL (ref 0–0.1)
BASOPHILS NFR BLD AUTO: 1 % (ref 0–1)
BILIRUB SERPL-MCNC: 0.67 MG/DL (ref 0.2–1)
BNP SERPL-MCNC: 3047 PG/ML (ref 0–100)
BUN SERPL-MCNC: 59 MG/DL (ref 5–25)
CALCIUM SERPL-MCNC: 8.8 MG/DL (ref 8.4–10.2)
CARDIAC TROPONIN I PNL SERPL HS: 93 NG/L (ref ?–50)
CARDIAC TROPONIN I PNL SERPL HS: 93 NG/L (ref ?–50)
CARDIAC TROPONIN I PNL SERPL HS: 95 NG/L (ref ?–50)
CHLORIDE SERPL-SCNC: 92 MMOL/L (ref 96–108)
CO2 SERPL-SCNC: 32 MMOL/L (ref 21–32)
CREAT SERPL-MCNC: 2.47 MG/DL (ref 0.6–1.3)
EOSINOPHIL # BLD AUTO: 0.17 THOUSAND/ÂΜL (ref 0–0.61)
EOSINOPHIL NFR BLD AUTO: 3 % (ref 0–6)
ERYTHROCYTE [DISTWIDTH] IN BLOOD BY AUTOMATED COUNT: 14.6 % (ref 11.6–15.1)
FLUAV AG UPPER RESP QL IA.RAPID: NEGATIVE
FLUBV AG UPPER RESP QL IA.RAPID: NEGATIVE
GFR SERPL CREATININE-BSD FRML MDRD: 22 ML/MIN/1.73SQ M
GLUCOSE SERPL-MCNC: 220 MG/DL (ref 65–140)
GLUCOSE SERPL-MCNC: 333 MG/DL (ref 65–140)
HCT VFR BLD AUTO: 31 % (ref 36.5–49.3)
HGB BLD-MCNC: 9.6 G/DL (ref 12–17)
IMM GRANULOCYTES # BLD AUTO: 0.02 THOUSAND/UL (ref 0–0.2)
IMM GRANULOCYTES NFR BLD AUTO: 0 % (ref 0–2)
LYMPHOCYTES # BLD AUTO: 1.04 THOUSANDS/ÂΜL (ref 0.6–4.47)
LYMPHOCYTES NFR BLD AUTO: 16 % (ref 14–44)
MCH RBC QN AUTO: 30.2 PG (ref 26.8–34.3)
MCHC RBC AUTO-ENTMCNC: 31 G/DL (ref 31.4–37.4)
MCV RBC AUTO: 98 FL (ref 82–98)
MONOCYTES # BLD AUTO: 0.4 THOUSAND/ÂΜL (ref 0.17–1.22)
MONOCYTES NFR BLD AUTO: 6 % (ref 4–12)
NEUTROPHILS # BLD AUTO: 5.03 THOUSANDS/ÂΜL (ref 1.85–7.62)
NEUTS SEG NFR BLD AUTO: 74 % (ref 43–75)
NRBC BLD AUTO-RTO: 0 /100 WBCS
PLATELET # BLD AUTO: 147 THOUSANDS/UL (ref 149–390)
PMV BLD AUTO: 11 FL (ref 8.9–12.7)
POTASSIUM SERPL-SCNC: 4.6 MMOL/L (ref 3.5–5.3)
PROT SERPL-MCNC: 6.9 G/DL (ref 6.4–8.4)
RBC # BLD AUTO: 3.18 MILLION/UL (ref 3.88–5.62)
SARS-COV+SARS-COV-2 AG RESP QL IA.RAPID: NEGATIVE
SODIUM SERPL-SCNC: 131 MMOL/L (ref 135–147)
WBC # BLD AUTO: 6.7 THOUSAND/UL (ref 4.31–10.16)

## 2024-12-18 PROCEDURE — 71046 X-RAY EXAM CHEST 2 VIEWS: CPT

## 2024-12-18 PROCEDURE — 99285 EMERGENCY DEPT VISIT HI MDM: CPT

## 2024-12-18 PROCEDURE — 36415 COLL VENOUS BLD VENIPUNCTURE: CPT

## 2024-12-18 PROCEDURE — 96374 THER/PROPH/DIAG INJ IV PUSH: CPT

## 2024-12-18 PROCEDURE — 82948 REAGENT STRIP/BLOOD GLUCOSE: CPT

## 2024-12-18 PROCEDURE — 85025 COMPLETE CBC W/AUTO DIFF WBC: CPT

## 2024-12-18 PROCEDURE — 83880 ASSAY OF NATRIURETIC PEPTIDE: CPT

## 2024-12-18 PROCEDURE — 93005 ELECTROCARDIOGRAM TRACING: CPT

## 2024-12-18 PROCEDURE — 87811 SARS-COV-2 COVID19 W/OPTIC: CPT

## 2024-12-18 PROCEDURE — 84484 ASSAY OF TROPONIN QUANT: CPT

## 2024-12-18 PROCEDURE — 80053 COMPREHEN METABOLIC PANEL: CPT

## 2024-12-18 PROCEDURE — 99223 1ST HOSP IP/OBS HIGH 75: CPT | Performed by: INTERNAL MEDICINE

## 2024-12-18 PROCEDURE — 87804 INFLUENZA ASSAY W/OPTIC: CPT

## 2024-12-18 RX ORDER — ACETAMINOPHEN 325 MG/1
650 TABLET ORAL EVERY 4 HOURS PRN
Status: DISCONTINUED | OUTPATIENT
Start: 2024-12-18 | End: 2024-12-24 | Stop reason: HOSPADM

## 2024-12-18 RX ORDER — FUROSEMIDE 10 MG/ML
80 INJECTION INTRAMUSCULAR; INTRAVENOUS
Status: DISCONTINUED | OUTPATIENT
Start: 2024-12-19 | End: 2024-12-19

## 2024-12-18 RX ORDER — INSULIN LISPRO 100 [IU]/ML
1-5 INJECTION, SOLUTION INTRAVENOUS; SUBCUTANEOUS
Status: DISCONTINUED | OUTPATIENT
Start: 2024-12-19 | End: 2024-12-23

## 2024-12-18 RX ORDER — METOPROLOL SUCCINATE 50 MG/1
50 TABLET, EXTENDED RELEASE ORAL DAILY
Status: DISCONTINUED | OUTPATIENT
Start: 2024-12-19 | End: 2024-12-23

## 2024-12-18 RX ORDER — ASPIRIN 81 MG/1
81 TABLET ORAL DAILY
Status: DISCONTINUED | OUTPATIENT
Start: 2024-12-19 | End: 2024-12-23

## 2024-12-18 RX ORDER — FUROSEMIDE 10 MG/ML
80 INJECTION INTRAMUSCULAR; INTRAVENOUS ONCE
Status: COMPLETED | OUTPATIENT
Start: 2024-12-18 | End: 2024-12-18

## 2024-12-18 RX ORDER — LISINOPRIL 20 MG/1
20 TABLET ORAL DAILY
Status: DISCONTINUED | OUTPATIENT
Start: 2024-12-19 | End: 2024-12-23

## 2024-12-18 RX ORDER — NIFEDIPINE 30 MG/1
60 TABLET, EXTENDED RELEASE ORAL DAILY
Status: DISCONTINUED | OUTPATIENT
Start: 2024-12-19 | End: 2024-12-23

## 2024-12-18 RX ORDER — GABAPENTIN 100 MG/1
100 CAPSULE ORAL 2 TIMES DAILY
Status: DISCONTINUED | OUTPATIENT
Start: 2024-12-19 | End: 2024-12-23

## 2024-12-18 RX ORDER — FINASTERIDE 5 MG/1
5 TABLET, FILM COATED ORAL DAILY
Status: DISCONTINUED | OUTPATIENT
Start: 2024-12-19 | End: 2024-12-24

## 2024-12-18 RX ORDER — ALPRAZOLAM 0.25 MG
0.25 TABLET ORAL
Status: DISCONTINUED | OUTPATIENT
Start: 2024-12-18 | End: 2024-12-23

## 2024-12-18 RX ORDER — INSULIN LISPRO 100 [IU]/ML
1-5 INJECTION, SOLUTION INTRAVENOUS; SUBCUTANEOUS
Status: DISCONTINUED | OUTPATIENT
Start: 2024-12-18 | End: 2024-12-23

## 2024-12-18 RX ADMIN — FUROSEMIDE 80 MG: 10 INJECTION, SOLUTION INTRAMUSCULAR; INTRAVENOUS at 17:59

## 2024-12-18 NOTE — TELEPHONE ENCOUNTER
Patient admitted 10/25-10/30 for CHF, ER visit 11/18, admitted for CHF 12/6-12/12    Called Perri back at [facility].    Last 3 weights recorded at [facility]:  12/3 217 lb  12/12 211 lb (discharge weight)  12/18 219.8 lb  Patient is not being weighed daily.  States order was inadvertently ordered as PRN at the facility so the patient was not being weighed daily.  States order has been corrected and they will start weighing daily.      Reports legs edema, states patient is not SOB.      Taking Bumex 2 mg BID, nifedipine 60 mg daily, lisinopril 20 mg daily, metoprolol 50 mg daily    Labs 12/12: Cr 2.01 (1.98 12/11), BUN 58 (52 12/11), eGFR 28 (29 12/11)    Called daughter to make her aware.  Daughter had previously refused sooner hospital follow up appointments before 12/27, now agreeable to 12/20.  Scheduled patient with Daisy 12/20.      Will request [facility] draw BMP if possible for updated labs.

## 2024-12-18 NOTE — TELEPHONE ENCOUNTER
[facility] Over the past week he has gained weight from 211lb to 219lbs. Please advise.

## 2024-12-19 ENCOUNTER — APPOINTMENT (OUTPATIENT)
Dept: VASCULAR ULTRASOUND | Facility: HOSPITAL | Age: 89
DRG: 291 | End: 2024-12-19
Payer: MEDICARE

## 2024-12-19 ENCOUNTER — TELEPHONE (OUTPATIENT)
Age: 89
End: 2024-12-19

## 2024-12-19 PROBLEM — L97.921 ULCER OF LEFT LOWER EXTREMITY, LIMITED TO BREAKDOWN OF SKIN (HCC): Status: ACTIVE | Noted: 2024-12-19

## 2024-12-19 LAB
ANION GAP SERPL CALCULATED.3IONS-SCNC: 6 MMOL/L (ref 4–13)
ANION GAP SERPL CALCULATED.3IONS-SCNC: 8 MMOL/L (ref 4–13)
BACTERIA UR QL AUTO: NORMAL /HPF
BASE EX.OXY STD BLDV CALC-SCNC: 60 % (ref 60–80)
BASE EXCESS BLDV CALC-SCNC: 6.8 MMOL/L
BASOPHILS # BLD AUTO: 0.04 THOUSANDS/ÂΜL (ref 0–0.1)
BASOPHILS NFR BLD AUTO: 1 % (ref 0–1)
BILIRUB UR QL STRIP: NEGATIVE
BUN SERPL-MCNC: 54 MG/DL (ref 5–25)
BUN SERPL-MCNC: 55 MG/DL (ref 5–25)
CALCIUM SERPL-MCNC: 8.9 MG/DL (ref 8.4–10.2)
CALCIUM SERPL-MCNC: 9.1 MG/DL (ref 8.4–10.2)
CHLORIDE SERPL-SCNC: 93 MMOL/L (ref 96–108)
CHLORIDE SERPL-SCNC: 94 MMOL/L (ref 96–108)
CLARITY UR: CLEAR
CO2 SERPL-SCNC: 33 MMOL/L (ref 21–32)
CO2 SERPL-SCNC: 33 MMOL/L (ref 21–32)
COLOR UR: ABNORMAL
CREAT SERPL-MCNC: 2.11 MG/DL (ref 0.6–1.3)
CREAT SERPL-MCNC: 2.4 MG/DL (ref 0.6–1.3)
CREAT UR-MCNC: 51.3 MG/DL
EOSINOPHIL # BLD AUTO: 0.3 THOUSAND/ÂΜL (ref 0–0.61)
EOSINOPHIL NFR BLD AUTO: 4 % (ref 0–6)
ERYTHROCYTE [DISTWIDTH] IN BLOOD BY AUTOMATED COUNT: 14.6 % (ref 11.6–15.1)
GFR SERPL CREATININE-BSD FRML MDRD: 23 ML/MIN/1.73SQ M
GFR SERPL CREATININE-BSD FRML MDRD: 27 ML/MIN/1.73SQ M
GLUCOSE P FAST SERPL-MCNC: 164 MG/DL (ref 65–99)
GLUCOSE P FAST SERPL-MCNC: 166 MG/DL (ref 65–99)
GLUCOSE SERPL-MCNC: 143 MG/DL (ref 65–140)
GLUCOSE SERPL-MCNC: 154 MG/DL (ref 65–140)
GLUCOSE SERPL-MCNC: 164 MG/DL (ref 65–140)
GLUCOSE SERPL-MCNC: 166 MG/DL (ref 65–140)
GLUCOSE SERPL-MCNC: 180 MG/DL (ref 65–140)
GLUCOSE SERPL-MCNC: 209 MG/DL (ref 65–140)
GLUCOSE UR STRIP-MCNC: NEGATIVE MG/DL
HCO3 BLDV-SCNC: 33.1 MMOL/L (ref 24–30)
HCT VFR BLD AUTO: 32.3 % (ref 36.5–49.3)
HGB BLD-MCNC: 9.7 G/DL (ref 12–17)
HGB UR QL STRIP.AUTO: ABNORMAL
IMM GRANULOCYTES # BLD AUTO: 0.02 THOUSAND/UL (ref 0–0.2)
IMM GRANULOCYTES NFR BLD AUTO: 0 % (ref 0–2)
KETONES UR STRIP-MCNC: NEGATIVE MG/DL
LEUKOCYTE ESTERASE UR QL STRIP: NEGATIVE
LYMPHOCYTES # BLD AUTO: 1.82 THOUSANDS/ÂΜL (ref 0.6–4.47)
LYMPHOCYTES NFR BLD AUTO: 24 % (ref 14–44)
MAGNESIUM SERPL-MCNC: 2.2 MG/DL (ref 1.9–2.7)
MCH RBC QN AUTO: 29.8 PG (ref 26.8–34.3)
MCHC RBC AUTO-ENTMCNC: 30 G/DL (ref 31.4–37.4)
MCV RBC AUTO: 99 FL (ref 82–98)
MICROALBUMIN UR-MCNC: 259.6 MG/L
MICROALBUMIN/CREAT 24H UR: 506 MG/G CREATININE (ref 0–30)
MONOCYTES # BLD AUTO: 0.47 THOUSAND/ÂΜL (ref 0.17–1.22)
MONOCYTES NFR BLD AUTO: 6 % (ref 4–12)
NEUTROPHILS # BLD AUTO: 5.05 THOUSANDS/ÂΜL (ref 1.85–7.62)
NEUTS SEG NFR BLD AUTO: 65 % (ref 43–75)
NITRITE UR QL STRIP: NEGATIVE
NON-SQ EPI CELLS URNS QL MICRO: NORMAL /HPF
NRBC BLD AUTO-RTO: 0 /100 WBCS
O2 CT BLDV-SCNC: 9 ML/DL
PCO2 BLDV: 56.5 MM HG (ref 42–50)
PH BLDV: 7.39 [PH] (ref 7.3–7.4)
PH UR STRIP.AUTO: 6.5 [PH]
PLATELET # BLD AUTO: 143 THOUSANDS/UL (ref 149–390)
PMV BLD AUTO: 10.8 FL (ref 8.9–12.7)
PO2 BLDV: 33.7 MM HG (ref 35–45)
POTASSIUM SERPL-SCNC: 4 MMOL/L (ref 3.5–5.3)
POTASSIUM SERPL-SCNC: 4.1 MMOL/L (ref 3.5–5.3)
PROT UR STRIP-MCNC: ABNORMAL MG/DL
RBC # BLD AUTO: 3.26 MILLION/UL (ref 3.88–5.62)
RBC #/AREA URNS AUTO: NORMAL /HPF
SODIUM 24H UR-SCNC: 54 MOL/L
SODIUM SERPL-SCNC: 133 MMOL/L (ref 135–147)
SODIUM SERPL-SCNC: 134 MMOL/L (ref 135–147)
SP GR UR STRIP.AUTO: 1.01 (ref 1–1.03)
UROBILINOGEN UR STRIP-ACNC: <2 MG/DL
UUN 24H UR-MCNC: 439 MG/DL
WBC # BLD AUTO: 7.7 THOUSAND/UL (ref 4.31–10.16)
WBC #/AREA URNS AUTO: NORMAL /HPF

## 2024-12-19 PROCEDURE — 87081 CULTURE SCREEN ONLY: CPT | Performed by: INTERNAL MEDICINE

## 2024-12-19 PROCEDURE — 83735 ASSAY OF MAGNESIUM: CPT | Performed by: INTERNAL MEDICINE

## 2024-12-19 PROCEDURE — 36415 COLL VENOUS BLD VENIPUNCTURE: CPT | Performed by: INTERNAL MEDICINE

## 2024-12-19 PROCEDURE — 93970 EXTREMITY STUDY: CPT | Performed by: INTERNAL MEDICINE

## 2024-12-19 PROCEDURE — 93970 EXTREMITY STUDY: CPT

## 2024-12-19 PROCEDURE — 80048 BASIC METABOLIC PNL TOTAL CA: CPT | Performed by: INTERNAL MEDICINE

## 2024-12-19 PROCEDURE — 82805 BLOOD GASES W/O2 SATURATION: CPT | Performed by: INTERNAL MEDICINE

## 2024-12-19 PROCEDURE — 84540 ASSAY OF URINE/UREA-N: CPT | Performed by: INTERNAL MEDICINE

## 2024-12-19 PROCEDURE — 85025 COMPLETE CBC W/AUTO DIFF WBC: CPT | Performed by: INTERNAL MEDICINE

## 2024-12-19 PROCEDURE — 82948 REAGENT STRIP/BLOOD GLUCOSE: CPT

## 2024-12-19 PROCEDURE — 82570 ASSAY OF URINE CREATININE: CPT | Performed by: INTERNAL MEDICINE

## 2024-12-19 PROCEDURE — 84300 ASSAY OF URINE SODIUM: CPT | Performed by: INTERNAL MEDICINE

## 2024-12-19 PROCEDURE — 99222 1ST HOSP IP/OBS MODERATE 55: CPT | Performed by: INTERNAL MEDICINE

## 2024-12-19 PROCEDURE — 99233 SBSQ HOSP IP/OBS HIGH 50: CPT | Performed by: INTERNAL MEDICINE

## 2024-12-19 PROCEDURE — 80048 BASIC METABOLIC PNL TOTAL CA: CPT | Performed by: PHYSICIAN ASSISTANT

## 2024-12-19 PROCEDURE — 81001 URINALYSIS AUTO W/SCOPE: CPT | Performed by: INTERNAL MEDICINE

## 2024-12-19 PROCEDURE — G0008 ADMIN INFLUENZA VIRUS VAC: HCPCS | Performed by: INTERNAL MEDICINE

## 2024-12-19 PROCEDURE — 90662 IIV NO PRSV INCREASED AG IM: CPT | Performed by: INTERNAL MEDICINE

## 2024-12-19 PROCEDURE — 99223 1ST HOSP IP/OBS HIGH 75: CPT | Performed by: STUDENT IN AN ORGANIZED HEALTH CARE EDUCATION/TRAINING PROGRAM

## 2024-12-19 PROCEDURE — 82043 UR ALBUMIN QUANTITATIVE: CPT | Performed by: INTERNAL MEDICINE

## 2024-12-19 RX ORDER — BUMETANIDE 0.25 MG/ML
4 INJECTION, SOLUTION INTRAMUSCULAR; INTRAVENOUS 2 TIMES DAILY
Status: DISCONTINUED | OUTPATIENT
Start: 2024-12-19 | End: 2024-12-24

## 2024-12-19 RX ADMIN — GABAPENTIN 100 MG: 100 CAPSULE ORAL at 09:25

## 2024-12-19 RX ADMIN — APIXABAN 2.5 MG: 2.5 TABLET, FILM COATED ORAL at 09:25

## 2024-12-19 RX ADMIN — METOPROLOL SUCCINATE 50 MG: 50 TABLET, EXTENDED RELEASE ORAL at 09:25

## 2024-12-19 RX ADMIN — ALPRAZOLAM 0.25 MG: 0.25 TABLET ORAL at 01:40

## 2024-12-19 RX ADMIN — FUROSEMIDE 80 MG: 10 INJECTION, SOLUTION INTRAMUSCULAR; INTRAVENOUS at 07:51

## 2024-12-19 RX ADMIN — GABAPENTIN 100 MG: 100 CAPSULE ORAL at 17:36

## 2024-12-19 RX ADMIN — APIXABAN 2.5 MG: 2.5 TABLET, FILM COATED ORAL at 17:36

## 2024-12-19 RX ADMIN — INSULIN LISPRO 1 UNITS: 100 INJECTION, SOLUTION INTRAVENOUS; SUBCUTANEOUS at 21:01

## 2024-12-19 RX ADMIN — ASPIRIN 81 MG: 81 TABLET, COATED ORAL at 09:25

## 2024-12-19 RX ADMIN — FINASTERIDE 5 MG: 5 TABLET, FILM COATED ORAL at 09:26

## 2024-12-19 RX ADMIN — BUMETANIDE 4 MG: 0.25 INJECTION INTRAMUSCULAR; INTRAVENOUS at 17:39

## 2024-12-19 RX ADMIN — LISINOPRIL 20 MG: 20 TABLET ORAL at 09:25

## 2024-12-19 RX ADMIN — ALPRAZOLAM 0.25 MG: 0.25 TABLET ORAL at 23:53

## 2024-12-19 RX ADMIN — NIFEDIPINE 60 MG: 30 TABLET, FILM COATED, EXTENDED RELEASE ORAL at 09:25

## 2024-12-19 RX ADMIN — INSULIN LISPRO 1 UNITS: 100 INJECTION, SOLUTION INTRAVENOUS; SUBCUTANEOUS at 09:11

## 2024-12-19 RX ADMIN — INFLUENZA A VIRUS A/VICTORIA/4897/2022 IVR-238 (H1N1) ANTIGEN (FORMALDEHYDE INACTIVATED), INFLUENZA A VIRUS A/CALIFORNIA/122/2022 SAN-022 (H3N2) ANTIGEN (FORMALDEHYDE INACTIVATED), AND INFLUENZA B VIRUS B/MICHIGAN/01/2021 ANTIGEN (FORMALDEHYDE INACTIVATED) 0.5 ML: 60; 60; 60 INJECTION, SUSPENSION INTRAMUSCULAR at 15:46

## 2024-12-19 RX ADMIN — BUMETANIDE 4 MG: 0.25 INJECTION INTRAMUSCULAR; INTRAVENOUS at 09:35

## 2024-12-19 NOTE — ASSESSMENT & PLAN NOTE
Baseline creatinine approximately 1.8-2.2  Presented with a creatinine of 2.4  Will monitor closely while on IV diuretics

## 2024-12-19 NOTE — ASSESSMENT & PLAN NOTE
Wt Readings from Last 3 Encounters:   12/18/24 103 kg (227 lb 1.2 oz)   12/11/24 96.1 kg (211 lb 13.8 oz)   12/02/24 96.6 kg (213 lb)     82-year-old male with history of CHF recently discharged approximately 1 week ago after acute CHF exacerbation reports worsening shortness of breath and peripheral edema  At home on Bumex twice daily  Will place on IV Lasix  Recent echo revealed EF of 35% on December 8th  Will consult cardiology for further evaluation

## 2024-12-19 NOTE — ASSESSMENT & PLAN NOTE
Baseline creatinine approximately 1.8-2.2  Presented with a creatinine of 2.4  Nephrology on board, discussed with family members if creatinine continues to get worse on aggressive IV diuresis instead of hemodialysis but will possibly choose hospice/palliative route

## 2024-12-19 NOTE — ASSESSMENT & PLAN NOTE
Upon epic chart review, previously known baseline creatinine was around 1.9-2.1.  Patient was previously followed by myself but was last seen in nephrology office in September 2023 and seems to have lost follow-up since that time.  Patient's current creatinine is 2.4 with EGFR of 23 which is slightly above the baseline.    Send presented to ER with worsening shortness of breath and leg swelling and currently requiring nasal oxygen at 4 L/min.  Patient has underlying cardiomyopathy with echocardiogram done on 12/8/2024 showed EF of 35%.  Patient was evaluated by cardiology team and was started on Bumex 4 mg IV twice daily which I agree with current clinical setting.  Will plan to check serial bladder scan to rule out urinary retention.  Will also plan to check urine lites for further evaluation and recheck renal function with a.m. lab.    Patient's son was present at bedside and discussed with him that we will consider hospice/palliative care if renal function worsen with aggressive diuresis rather than hemodialysis..

## 2024-12-19 NOTE — TELEPHONE ENCOUNTER
Anneliese from [facility] called to inform us that the patient was admitted to the hospital yesterday and is scheduled for a post hosp f/u tomorrow morning from previous hospitalization. Please let her know if this should be canceled. It is not clear if the patient will still be admitted.   Anneliese # [phone]

## 2024-12-19 NOTE — PLAN OF CARE

## 2024-12-19 NOTE — PROGRESS NOTES
Progress Note - Hospitalist   Name: Tom Stewart 89 y.o. male I MRN: [id number]  Unit/Bed#: ED 27 I Date of Admission: 12/18/2024   Date of Service: 12/19/2024 I Hospital Day: 0    Assessment & Plan  Acute on chronic heart failure with preserved ejection fraction (HCC)  Wt Readings from Last 3 Encounters:   12/18/24 103 kg (227 lb 1.2 oz)   12/11/24 96.1 kg (211 lb 13.8 oz)   12/02/24 96.6 kg (213 lb)     82-year-old male with history of CHF recently discharged approximately 1 week ago after acute CHF exacerbation reports worsening shortness of breath and peripheral edema  At home on Bumex twice daily  Recent echo revealed EF of 35% on December 8th  Cardiology appreciated, was started on IV Lasix upon admission but urine output not good enough hence cardiology changed to Bumex 4 mg IV        Paroxysmal atrial fibrillation (HCC)  Currently rate controlled  Continue beta-blocker  Continue Eliquis  Type 2 diabetes mellitus with stage 4 chronic kidney disease, without long-term current use of insulin (HCC)  Hold p.o. glipizide  Ssi  Monitor BG  Stage 4 chronic kidney disease (HCC)  Baseline creatinine approximately 1.8-2.2  Presented with a creatinine of 2.4  Nephrology on board, discussed with family members if creatinine continues to get worse on aggressive IV diuresis instead of hemodialysis but will possibly choose hospice/palliative route  Primary hypertension  BP currently controlled  Continue nifedipine, lisinopril, metoprolol  Other Alzheimer's disease (HCC)  Currently at baseline mental status  Delirium precautions  Moderate aortic stenosis    Ulcer of left lower extremity, limited to breakdown of skin (HCC)  Patient with bilateral lower extremity chronic wounds  Lower extremity Doppler to rule out DVT  Wound care consult  Nutrition consult  Discussed with RN, change dressing, upload picture in media, heel offload    VTE Pharmacologic Prophylaxis: VTE Score: 7 Moderate Risk (Score 3-4) - Pharmacological DVT  Prophylaxis Ordered: apixaban (Eliquis).    Mobility:      JH-HLM Goal NOT achieved. Continue with multidisciplinary rounding and encourage appropriate mobility to improve upon JH-HLM goals.    Patient Centered Rounds: I performed bedside rounds with nursing staff today.   Discussions with Specialists or Other Care Team Provider: yes    Education and Discussions with Family / Patient: Updated  (son) at bedside.    Current Length of Stay: 0 day(s)  Current Patient Status: Observation   Certification Statement: The patient will continue to require additional inpatient hospital stay due to chf exac  Discharge Plan: Anticipate discharge in 48-72 hrs to prior assisted or independent living facility.    Code Status: Level 1 - Full Code    Subjective   Resting in bed, patient son at bedside and granddaughter at bedside.  States he feels slightly better but still edematous and requiring oxygen 4 L via nasal cannula.  Nephrology at bedside as well.  Denies nausea or vomiting    Objective :  Temp:  [97.9 °F (36.6 °C)] 97.9 °F (36.6 °C)  HR:  [59-97] 80  BP: (124-159)/(60-92) 153/80  Resp:  [18-36] 28  SpO2:  [96 %-99 %] 97 %  O2 Device: Nasal cannula  Nasal Cannula O2 Flow Rate (L/min):  [3 L/min-4 L/min] 4 L/min    Body mass index is 32.58 kg/m².     Input and Output Summary (last 24 hours):     Intake/Output Summary (Last 24 hours) at 12/19/2024 1144  Last data filed at 12/19/2024 1122  Gross per 24 hour   Intake --   Output 550 ml   Net -550 ml       Physical Exam  HENT:      Nose:      Comments: Lesion at the base of right nose that has been biopsied     Mouth/Throat:      Mouth: Mucous membranes are moist.   Eyes:      Pupils: Pupils are equal, round, and reactive to light.   Cardiovascular:      Rate and Rhythm: Regular rhythm.      Heart sounds: Murmur heard.   Pulmonary:      Breath sounds: Rales present.      Comments: Mildly decreased at the bases with few crackles  Abdominal:      General: Bowel  sounds are normal.      Palpations: Abdomen is soft.   Musculoskeletal:         General: Normal range of motion.      Cervical back: Normal range of motion.      Right lower leg: Edema present.      Left lower leg: Edema present.   Skin:     General: Skin is warm.      Findings: Lesion present.   Neurological:      Mental Status: He is alert. Mental status is at baseline.           Lines/Drains:        Telemetry:  Telemetry Orders (From admission, onward)               24 Hour Telemetry Monitoring  Continuous x 24 Hours (Telem)        Question:  Reason for 24 Hour Telemetry  Answer:  Decompensated CHF- and any one of the following: continuous diuretic infusion or total diuretic dose >200 mg daily, associated electrolyte derangement (I.e. K < 3.0), inotropic drip (continuous infusion), hx of ventricular arrhythmia, or new EF < 35%                     Telemetry Reviewed: Normal Sinus Rhythm  Indication for Continued Telemetry Use: Acute CHF on >200 mg lasix/day or equivalent dose or with new reduced EF.                Lab Results: I have reviewed the following results:   Results from last 7 days   Lab Units 12/19/24  0508   WBC Thousand/uL 7.70   HEMOGLOBIN g/dL 9.7*   HEMATOCRIT % 32.3*   PLATELETS Thousands/uL 143*   SEGS PCT % 65   LYMPHO PCT % 24   MONO PCT % 6   EOS PCT % 4     Results from last 7 days   Lab Units 12/19/24  0508 12/18/24  1749   SODIUM mmol/L 133* 131*   POTASSIUM mmol/L 4.0 4.6   CHLORIDE mmol/L 94* 92*   CO2 mmol/L 33* 32   BUN mg/dL 55* 59*   CREATININE mg/dL 2.40* 2.47*   ANION GAP mmol/L 6 7   CALCIUM mg/dL 8.9 8.8   ALBUMIN g/dL  --  3.5   TOTAL BILIRUBIN mg/dL  --  0.67   ALK PHOS U/L  --  58   ALT U/L  --  16   AST U/L  --  14   GLUCOSE RANDOM mg/dL 166* 333*         Results from last 7 days   Lab Units 12/19/24  1123 12/19/24  0653 12/18/24  2137   POC GLUCOSE mg/dl 180* 154* 220*               Recent Cultures (last 7 days):         Imaging Results Review: I reviewed radiology reports  from this admission including: chest xray.  Other Study Results Review: No additional pertinent studies reviewed.    Last 24 Hours Medication List:     Current Facility-Administered Medications:     acetaminophen (TYLENOL) tablet 650 mg, Q4H PRN    ALPRAZolam (XANAX) tablet 0.25 mg, HS PRN    apixaban (ELIQUIS) tablet 2.5 mg, BID    aspirin (ECOTRIN LOW STRENGTH) EC tablet 81 mg, Daily    bumetanide (BUMEX) injection 4 mg, BID    finasteride (PROSCAR) tablet 5 mg, Daily    gabapentin (NEURONTIN) capsule 100 mg, BID    insulin lispro (HumALOG/ADMELOG) 100 units/mL subcutaneous injection 1-5 Units, TID AC **AND** Fingerstick Glucose (POCT), TID AC    insulin lispro (HumALOG/ADMELOG) 100 units/mL subcutaneous injection 1-5 Units, HS    lisinopril (ZESTRIL) tablet 20 mg, Daily    metoprolol succinate (TOPROL-XL) 24 hr tablet 50 mg, Daily    NIFEdipine (PROCARDIA XL) 24 hr tablet 60 mg, Daily    Administrative Statements   Today, Patient Was Seen By: Angie Jiang MD  I have spent a total time of 25 minutes in caring for this patient on the day of the visit/encounter including Reviewing / ordering tests, medicine, procedures  .    **Please Note: This note may have been constructed using a voice recognition system.**

## 2024-12-19 NOTE — ASSESSMENT & PLAN NOTE
Wt Readings from Last 3 Encounters:   12/18/24 103 kg (227 lb 1.2 oz)   12/11/24 96.1 kg (211 lb 13.8 oz)   12/02/24 96.6 kg (213 lb)     82-year-old male with history of CHF recently discharged approximately 1 week ago after acute CHF exacerbation reports worsening shortness of breath and peripheral edema  At home on Bumex twice daily  Recent echo revealed EF of 35% on December 8th  Cardiology appreciated, was started on IV Lasix upon admission but urine output not good enough hence cardiology changed to Bumex 4 mg IV         none

## 2024-12-19 NOTE — ASSESSMENT & PLAN NOTE
Blood pressures currently acceptable and plan to monitor hypertension with lisinopril 20 mg p.o. daily, metoprolol XL 50 mg p.o. daily, nifedipine XL 60 mg p.o. daily and current dose of IV Bumex.

## 2024-12-19 NOTE — CASE MANAGEMENT
Case Management Assessment & Discharge Planning Note    Patient name Tom Stewart  Location ED 27/ED 27 MRN [id number]  : 1935 Date 2024       Current Admission Date: 2024  Current Admission Diagnosis:Acute on chronic heart failure with preserved ejection fraction (HCC)   Patient Active Problem List    Diagnosis Date Noted Date Diagnosed    Ulcer of left lower extremity, limited to breakdown of skin (HCC) 2024     CAD s/p PCI to LAD 2024     Encounter for delirium elderly at risk (DEAR) screening 10/28/2024     Frailty syndrome in geriatric patient 10/28/2024     Moderate aortic stenosis 10/28/2024     Acute respiratory failure with hypoxia ([facility]) 10/25/2024     Sebaceous cyst 2024     Nose ulceration 2024     Primary hypertension 2023     Acute on chronic heart failure with preserved ejection fraction (HCC) 2023     Stage 4 chronic kidney disease ([facility]) 2023     Gait instability 2022     Hearing loss 2022     Other Alzheimer's disease (HCC) 2022     Other proteinuria 2021     Vitamin D deficiency 2021     Ambulatory dysfunction 2021     Fall 2020     Type 2 diabetes mellitus with stage 4 chronic kidney disease, without long-term current use of insulin ([facility]) 2019     Paroxysmal atrial fibrillation ([facility]) 2019     History of CVA 2019     Mixed hyperlipidemia 2017     Coronary artery disease involving native coronary artery of native heart without angina pectoris 2017       LOS (days): 0  Geometric Mean LOS (GMLOS) (days): 3.9  Days to GMLOS:3.9     OBJECTIVE:  PATIENT READMITTED TO HOSPITAL      Current admission status: Inpatient       Preferred Pharmacy:   [id number] - [place] - [address]  [place] [address]  Phone: [phone] Fax: [phone]    [facility] [address]  [address]  [address] [address]  Phone: [phone] Fax: [phone]    Primary Care Provider: Beto Garnett DO    Primary Insurance: MEDICARE  Secondary Insurance: [employer]    ASSESSMENT:  Active Health Care Proxies       Chanel Crockett Health Care Representative - Daughter   Primary Phone: [phone] (Mobile)                 Advance Directives  Does patient have a Health Care POA?: Yes  Does patient have Advance Directives?: Yes  Advance Directives: Power of  for health care, Power of  for finance  Primary Contact: Patient's Daughter (Chanel)    Readmission Root Cause  30 Day Readmission: No (Currently observation status)    Patient Information  Admitted from:: Facility  Mental Status: Confused  During Assessment patient was accompanied by: Not accompanied during assessment  Assessment information provided by:: Daughter  Primary Caregiver: Other (Comment)  Caregiver's Name:: [facility] Staff  Caregiver's Relationship to Patient:: Other (Specify)  Support Systems: Self, Son, Daughter  County of Residence: [place]  What city do you live in[place]  Home entry access options. Select all that apply.: No steps to enter home  Type of Current Residence: Facility ([facility])  Upon entering residence, is there a bedroom on the main floor (no further steps)?: Yes  Upon entering residence, is there a bathroom on the main floor (no further steps)?: Yes  Living Arrangements: Lives in Facility  Is patient a ?: No    Activities of Daily Living Prior to Admission  Functional Status: Total dependent  Completes ADLs independently?: No  Level of ADL dependence: Total Dependent  Ambulates independently?: No  Level of ambulatory dependence: Total Dependent  Does patient use assisted devices?: Yes  Assisted Devices (DME) used: Home Oxygen concentrator, Portable Oxygen concentrator, Wheelchair  DME Company Name (respiratory supplies): Through [facility]  Does patient  currently own DME?: No  Does patient have a history of Outpatient Therapy (PT/OT)?: No  Does the patient have a history of Short-Term Rehab?: Yes  Does patient have a history of HHC?: Yes  Does patient currently have HHC?: No    Patient Information Continued  Income Source: SSI/SSD  Does patient have prescription coverage?: Yes  Does patient receive dialysis treatments?: No  Does patient have a history of substance abuse?: No  Does patient have a history of Mental Health Diagnosis?: No    Means of Transportation  Means of Transport to [facility]:: Other (Comment) (Requires BLS transport)    DISCHARGE DETAILS:    Discharge planning discussed with:: Patient's Daughter  Freedom of Choice: Yes  Comments - Freedom of Choice: CM called patient's daughter and introduced self and role. CM then discussed FOC as it pertains to discharge planning. Patient's daughter reported that patient is a long term resident at [facility], but they are in the process of transferring him to another LTC facility in [place]. Patient's daughter prefers that he returns to [facility] for now and denied any additional needs at this time. Patient will need BLS transportation back to [facility] once he is cleared for discharge.  CM contacted family/caregiver?: Yes  Were Treatment Team discharge recommendations reviewed with patient/caregiver?: Yes  Did patient/caregiver verbalize understanding of patient care needs?: N/A- going to facility  Were patient/caregiver advised of the risks associated with not following Treatment Team discharge recommendations?: Yes    Contacts  Patient Contacts: Chanel  Relationship to Patient:: Family  Contact Method: Phone  Phone Number: [phone]  Reason/Outcome: Continuity of Care, Discharge Planning    Requested Home Health Care         Is the patient interested in HHC at discharge?: No    DME Referral Provided  Referral made for DME?: No    Other Referral/Resources/Interventions Provided:  Interventions: SNF  Referral Comments: CM sent  a referral to [facility] via [facility] asking if they are able to accept patient back once cleared for discharge. Response pending.    Would you like to participate in our [facility] service program?  : No - Declined    Treatment Team Recommendation: SNF  Discharge Destination Plan:: SNF  Transport at Discharge : BLS Ambulance

## 2024-12-19 NOTE — ASSESSMENT & PLAN NOTE
Lab Results   Component Value Date    EGFR 23 12/19/2024    EGFR 22 12/18/2024    EGFR 28 12/12/2024    CREATININE 2.40 (H) 12/19/2024    CREATININE 2.47 (H) 12/18/2024    CREATININE 2.01 (H) 12/12/2024   Cr today 2.4  Baseline 1.8-2.2  Continue to monitor closely

## 2024-12-19 NOTE — CONSULTS
NEPHROLOGY CONSULTATION NOTE    Patient: Tom Stewart               Sex: male          DOA: 12/18/2024  5:26 PM   YOB: 1935         Age: 89 y.o.         LOS:  LOS: 0 days   Encounter Date: 12/19/2024    REFERRING PHYSICIAN: Angie Jiang MD     REASON FOR THE REFERRAL / CONSULTATION: Further management of chronic kidney disease stage IV.    DATE OF CONSULTATION / SERVICE: 12/19/2024    ADMISSION DIAGNOSIS: Acute on chronic heart failure with preserved ejection fraction (HCC)     Chief Complaint   Patient presents with    Chest Pain     Facility reports patient stating he had chest pain, though patient now denies.  Pt recently admitted for heart failure, has gained 16lbs since discharge one week ago        ASSESSMENT / PLAN     Assessment & Plan  Stage 4 chronic kidney disease (HCC)    Upon epic chart review, previously known baseline creatinine was around 1.9-2.1.  Patient was previously followed by myself but was last seen in nephrology office in September 2023 and seems to have lost follow-up since that time.  Patient's current creatinine is 2.4 with EGFR of 23 which is slightly above the baseline.    Send presented to ER with worsening shortness of breath and leg swelling and currently requiring nasal oxygen at 4 L/min.  Patient has underlying cardiomyopathy with echocardiogram done on 12/8/2024 showed EF of 35%.  Patient was evaluated by cardiology team and was started on Bumex 4 mg IV twice daily which I agree with current clinical setting.  Will plan to check serial bladder scan to rule out urinary retention.  Will also plan to check urine lites for further evaluation and recheck renal function with a.m. lab.    Patient's son was present at bedside and discussed with him that we will consider hospice/palliative care if renal function worsen with aggressive diuresis rather than hemodialysis..  Primary hypertension    Blood pressures currently acceptable and plan to monitor hypertension  with lisinopril 20 mg p.o. daily, metoprolol XL 50 mg p.o. daily, nifedipine XL 60 mg p.o. daily and current dose of IV Bumex.    Overall above mentioned plan and recommendations were also d/w current [facility] physician and they agreed with my plan of monitoring renal function with IV diuretics.    Afshan Sandoval MD  Nephrology  12/19/2024    HPI     This is a 89-year-old male with underlying chronic kidney disease, presented to ER with chief complaint of worsening shortness of breath and leg swelling.  On admission patient was found to have elevated creatinine and nephrology were consulted for further management of CKD stage 4.    Upon epic chart review, patient was previously followed by myself in nephrology office but was last seen in September 2023.  Seems to me that patient has lost follow-up.  Previously known baseline creatinine was around 1.9-2.1 and current creatinine is 2.4 with EGFR of 23 which is slightly above baseline.    Patient was earlier admitted and discharged from the hospital last week and during that time underwent echocardiogram showed EF of 35%.  Patient has worsening leg swelling and hypoxia and upon discharge patient was sent with nasal oxygen at 2 L/min and now requiring nasal oxygen at 4 L/min.  Admission BNP was also elevated at 3047.    Patient has underlying hypertension and taking multiple antihypertensive medications.     Currently patient denies nausea, vomiting, headache, dizziness, abdominal pain, constipation or rash.    PAST MEDICAL HISTORY     Past Medical History:   Diagnosis Date    Acute deep vein thrombosis (DVT) of brachial vein of left upper extremity (HCC) 11/24/2017    Acute deep vein thrombosis (DVT) of left peroneal vein (HCC)     Acute ST elevation myocardial infarction (HCC)     Aortic valve stenosis     Arthritis     Atrial fibrillation (HCC)     Basilar artery stenosis     Basilar artery stenosis     Basilar artery stenosis 05/04/2020    MRA Head wo contrast  (12/13/2019)  IMPRESSION:   Multifocal intracranial atherosclerotic disease with moderate to severe stenosis at the origin of the left M1 segment with additional atherosclerotic disease noted in the distal right M1 and proximal inferior left M2 branches.   Moderate to severe stenosis in the proximal and mid basilar artery with nonvisualization of the right intradural vertebral    Benign prostatic hyperplasia with lower urinary tract symptoms     CAD (coronary artery disease)     CAD in native artery 11/24/2017    Underwent cardiac catheterization on 1/30/2020 and had mid and distal LAD stent placed  Cardiac PCI (1/30/2020)  SUMMARY   CORONARY CIRCULATION: Mid LAD: There was a 90 % stenosis at the site of a prior stent. Distal LAD: There was a 90 % stenosis at the site of a prior stent. Left posterior descending artery: There was a diffuse 50 % stenosis.   1ST LESION INTERVENTIONS: A balloon angioplasty wit    Cerebrovascular small vessel disease 11/12/2020    Chronic kidney disease     Closed fracture of multiple ribs of left side with routine healing 09/03/2020    Coronary artery disease     Dementia (HCC)     Diabetes mellitus (HCC)     DM2 (diabetes mellitus, type 2) (HCC)     Elevated troponin 07/19/2021    Herpes zoster without complication 07/28/2021    History of CVA (cerebrovascular accident) 02/21/2019    History of DVT of peroneal vein left lower extremity 12/16/2019    History of transfusion     HLD (hyperlipidemia)     HTN (hypertension)     Infected sebaceous cyst of skin 06/08/2021    Lump in chest 06/01/2021    Middle cerebral artery stenosis     Mild to moderate aortic stenosis 10/09/2018    ECHO (7/2020)  AORTIC VALVE: Leaflets exhibited moderately increased thickness and moderate calcification. Transaortic velocity was increased due to valvular stenosis. There was mild to moderate stenosis. RICHY 1.4cm, mean pressure gradient 11mmHg, peak velocity 2.15m/s There was mild regurgitation.    Myocardial  infarction (HCC)     Near syncope 07/19/2021    Other proteinuria 10/08/2019    Proteinuria     Rib fractures (right) 07/31/2020    Stroke (HCC)     Symptomatic bradycardia     Transient cerebral ischemia     Vitamin D deficiency        PAST SURGICAL HISTORY     Past Surgical History:   Procedure Laterality Date    CARDIAC CATHETERIZATION      Outcome: successful; last assessed: 02/03/2015    CARDIAC CATHETERIZATION  11/26/2019    CORONARY ANGIOPLASTY WITH STENT PLACEMENT  2008    stent to LAD     CORONARY ARTERY BYPASS GRAFT      HAND SURGERY      thumb    HIP HARDWARE REMOVAL      JOINT REPLACEMENT      TOTAL HIP ARTHROPLASTY Right     TOTAL HIP ARTHROPLASTY Bilateral        ALLERGIES     Allergies   Allergen Reactions    Celecoxib Other (See Comments) and Hives     Per pt does NOT remember type of reaction or severity level    Hydromorphone Other (See Comments) and Hives     Per pt does NOT remember type of reaction or severity level    Pravastatin Hives    Statins Other (See Comments) and Hives     Per pt does NOT remember type of reaction or severity level       SOCIAL HISTORY     Social History     Substance and Sexual Activity   Alcohol Use Never     Social History     Substance and Sexual Activity   Drug Use Never     Social History     Tobacco Use   Smoking Status Never    Passive exposure: Never   Smokeless Tobacco Never       FAMILY HISTORY     Family History   Problem Relation Age of Onset    Emphysema Father     Emphysema Sister        CURRENT MEDICATIONS       Current Facility-Administered Medications:     acetaminophen (TYLENOL) tablet 650 mg, 650 mg, Oral, Q4H PRN, Eliud Day MD    ALPRAZolam (XANAX) tablet 0.25 mg, 0.25 mg, Oral, HS PRN, Eliud Day MD, 0.25 mg at 12/19/24 0140    apixaban (ELIQUIS) tablet 2.5 mg, 2.5 mg, Oral, BID, Eliud Day MD, 2.5 mg at 12/19/24 0925    aspirin (ECOTRIN LOW STRENGTH) EC tablet 81 mg, 81 mg, Oral, Daily, Eliud Day MD, 81 mg at 12/19/24 0968     bumetanide (BUMEX) injection 4 mg, 4 mg, Intravenous, BID, Ade Garcia PA-C, 4 mg at 12/19/24 0935    finasteride (PROSCAR) tablet 5 mg, 5 mg, Oral, Daily, Eliud Day MD, 5 mg at 12/19/24 0926    gabapentin (NEURONTIN) capsule 100 mg, 100 mg, Oral, BID, Eliud Day MD, 100 mg at 12/19/24 0925    insulin lispro (HumALOG/ADMELOG) 100 units/mL subcutaneous injection 1-5 Units, 1-5 Units, Subcutaneous, TID AC, 1 Units at 12/19/24 0911 **AND** Fingerstick Glucose (POCT), , , TID AC, Eliud Day MD    insulin lispro (HumALOG/ADMELOG) 100 units/mL subcutaneous injection 1-5 Units, 1-5 Units, Subcutaneous, HS, Eliud Day MD    lisinopril (ZESTRIL) tablet 20 mg, 20 mg, Oral, Daily, Eliud Day MD, 20 mg at 12/19/24 0925    metoprolol succinate (TOPROL-XL) 24 hr tablet 50 mg, 50 mg, Oral, Daily, Eliud Day MD, 50 mg at 12/19/24 0925    NIFEdipine (PROCARDIA XL) 24 hr tablet 60 mg, 60 mg, Oral, Daily, Eliud Day MD, 60 mg at 12/19/24 0925    Current Outpatient Medications:     acetaminophen (TYLENOL) 500 mg tablet, Take 500 mg by mouth every 6 (six) hours as needed for mild pain, Disp: , Rfl:     ALPRAZolam (NIRAVAM) 0.25 MG dissolvable tablet, Take 0.25 mg by mouth daily at bedtime as needed for anxiety, Disp: , Rfl:     apixaban (ELIQUIS) 2.5 mg, Take 1 tablet (2.5 mg total) by mouth 2 (two) times a day, Disp: 180 tablet, Rfl: 3    aspirin (ECOTRIN LOW STRENGTH) 81 mg EC tablet, Take 1 tablet (81 mg total) by mouth daily, Disp: , Rfl:     Blood Glucose Monitoring Suppl (ONE TOUCH ULTRA MINI) w/Device KIT, Use daily, Disp: 1 kit, Rfl: 0    bumetanide (BUMEX) 2 mg tablet, Take 1 tablet (2 mg total) by mouth 2 (two) times a day, Disp: 60 tablet, Rfl: 0    cholecalciferol (VITAMIN D3) 1,000 units tablet, Take 1,000 Units by mouth daily, Disp: , Rfl:     finasteride (PROSCAR) 5 mg tablet, TAKE 1 TABLET BY MOUTH DAILY, Disp: 90 tablet, Rfl: 3    gabapentin (NEURONTIN) 100 mg capsule, TAKE 1 CAPSULE BY MOUTH  TWICE  DAILY, Disp: 180 capsule, Rfl: 3    glipiZIDE (GLUCOTROL XL) 2.5 mg 24 hr tablet, TAKE 1 TABLET BY MOUTH DAILY, Disp: 90 tablet, Rfl: 3    glucose blood (OneTouch Ultra) test strip, Check blood sugars once daily. Please substitute with appropriate alternative as covered by patient's insurance. Dx: E11.65, Disp: 100 each, Rfl: 3    lisinopril (ZESTRIL) 20 mg tablet, TAKE 1 TABLET BY MOUTH DAILY, Disp: 90 tablet, Rfl: 3    metoprolol succinate (TOPROL-XL) 50 mg 24 hr tablet, Take 1 tablet (50 mg total) by mouth daily, Disp: 30 tablet, Rfl: 0    NIFEdipine (PROCARDIA XL) 30 mg 24 hr tablet, Take 2 tablets (60 mg total) by mouth daily, Disp: 60 tablet, Rfl: 0    Omega-3 Fatty Acids (FISH OIL CONCENTRATE PO), Take 1 tablet by mouth daily, Disp: , Rfl:     vitamin B-12 (CYANOCOBALAMIN) 100 MCG tablet, Take 1,000 mcg by mouth daily, Disp: , Rfl:     REVIEW OF SYSTEMS     Complete 10 points of review of systems were obtained and discussed in length with patient today.  Complete 10 points of review of systems were negative/unremarkable except mentioned in the HPI section.      OBJECTIVE     Current Weight: Weight - Scale: 103 kg (227 lb 1.2 oz)  /92   Pulse 97   Temp 97.9 °F (36.6 °C) (Oral)   Resp 22   Wt 103 kg (227 lb 1.2 oz)   SpO2 96%   BMI 32.58 kg/m²   Vitals:    12/19/24 0900   BP: 157/92   Pulse: 97   Resp: 22   Temp:    SpO2:      Body mass index is 32.58 kg/m².    Intake/Output Summary (Last 24 hours) at 12/19/2024 1020  Last data filed at 12/19/2024 0258  Gross per 24 hour   Intake --   Output 250 ml   Net -250 ml       PHYSICAL EXAMINATION     Physical Exam  Constitutional:       General: He is not in acute distress.     Appearance: He is obese. He is ill-appearing.   HENT:      Right Ear: External ear normal.   Eyes:      Conjunctiva/sclera:      Right eye: No hemorrhage.  Neck:      Thyroid: No thyromegaly.   Pulmonary:      Effort: No accessory muscle usage or respiratory distress.  "  Abdominal:      General: There is no distension.   Musculoskeletal:         General: Swelling present.   Skin:     Coloration: Skin is not jaundiced.   Psychiatric:         Behavior: Behavior is not combative.           LAB RESULTS        Results from last 7 days   Lab Units 12/19/24  0508 12/18/24  1749   WBC Thousand/uL 7.70 6.70   HEMOGLOBIN g/dL 9.7* 9.6*   HEMATOCRIT % 32.3* 31.0*   PLATELETS Thousands/uL 143* 147*   SODIUM mmol/L 133* 131*   POTASSIUM mmol/L 4.0 4.6   CHLORIDE mmol/L 94* 92*   CO2 mmol/L 33* 32   BUN mg/dL 55* 59*   CREATININE mg/dL 2.40* 2.47*   EGFR ml/min/1.73sq m 23 22   CALCIUM mg/dL 8.9 8.8   MAGNESIUM mg/dL 2.2  --        I have personally reviewed the old medical records and patient's previously known baseline creatinine level is ~ 1.9-2.1    RADIOLOGY RESULTS       XR chest 2 views   ED Interpretation by Mauricio Lozoya DO (12/18 1808)   Diffuse pulmonary vascular congestion as interpreted by me        Final Result by Ifeanyi Castaneda MD (12/19 0820)      Mild pulmonary vascular congestion.      Clinical provider is aware of the findings.            Workstation performed: [id number]          VAS VENOUS DUPLEX - LOWER LIMB BILATERAL    (Results Pending)       Portions of the record may have been created with voice recognition software. Occasional wrong word or \"sound a like\" substitutions may have occurred due to the inherent limitations of voice recognition software. Read the chart carefully and recognize, using context, where substitutions have occurred.    "

## 2024-12-19 NOTE — CONSULTS
Consultation - Cardiology   Name: Tom Stewart 89 y.o. male I MRN: [id number]  Unit/Bed#: ED 27 I Date of Admission: 12/18/2024   Date of Service: 12/19/2024 I Hospital Day: 0   Inpatient consult to Cardiology  Consult performed by: Ade Garcia PA-C  Consult ordered by: Eliud Day MD        Physician Requesting Evaluation: Anige Jiang MD   Reason for Evaluation / Principal Problem: chf    Assessment & Plan  Acute on chronic heart failure with preserved ejection fraction (HCC)  Wt Readings from Last 3 Encounters:   12/18/24 103 kg (227 lb 1.2 oz)   12/11/24 96.1 kg (211 lb 13.8 oz)   12/02/24 96.6 kg (213 lb)   +hypervolemic  Previously on Bumex 2 mg twice daily  Currently with lackluster urinary output on IV Lasix  Switch over to Bumex 4 mg IV twice daily; if urine output is still minimal will need to consider Bumex drip  Continue Eliquis, aspirin, lisinopril, Toprol, nifedipine  Monitor BMP closely, plan for BMP at 2 PM  Daily weights, salt restriction  Strict I's and O's  Patient with urinary catheter in place  Last echo 12/8/2024 EF 35%, moderate global hypokinesis with regional variation, ungraded diastolic dysfunction, RV dilated, biatrial dilation, aortic valve thickened, calcified with reduced mobility, moderate to severe AS, gradients 42/26, RICHY 0.79 cm², mild MAC, mild to moderate MR, mild TR, mild to moderate pulmonary hypertension with RVSP 46mmhg, trace NC, IVC dilated    Paroxysmal atrial fibrillation (HCC)  Rate controlled 60s to 90s  Continue Eliquis 2.5 twice daily, Toprol 50  Monitor telemetry  Primary hypertension  Stable, 151/85  Continue with Toprol, lisinopril, Bumex  Moderate aortic stenosis  Moderate to severe per echo from 11 days ago; gradients 42/26, RICHY 0.79 cm²  Type 2 diabetes mellitus with stage 4 chronic kidney disease, without long-term current use of insulin (HCC)  Lab Results   Component Value Date    HGBA1C 8.5 (H) 12/06/2024   Mgmt per SLIM    Stage 4 chronic kidney  disease (HCC)  Lab Results   Component Value Date    EGFR 23 12/19/2024    EGFR 22 12/18/2024    EGFR 28 12/12/2024    CREATININE 2.40 (H) 12/19/2024    CREATININE 2.47 (H) 12/18/2024    CREATININE 2.01 (H) 12/12/2024   Cr today 2.4  Baseline 1.8-2.2  Continue to monitor closely  Other Alzheimer's disease (HCC)  Management per Jer  I have discussed the above management plan in detail with the primary service.     History of Present Illness   Tom Stewart is a 89 y.o. male who presents with 15 pound weight gain from [facility].  Patient recently hospitalized and discharged after a 1 week stay here for CHF exacerbation.  Patient sent to [facility] where he is a resident and family was called with a 15 pound weight gain, after speaking with cardiology office patient was advised to come into the emergency room.  [facility] sent him in.  Patient has baseline Alzheimer's.  Family at the bedside.  Patient was taking Bumex 2 mg twice daily at [facility].  Patient currently denies chest pain, chest pressure, chest heaviness.  Admits to having trouble taking a deep breath.  Appears comfortable in no acute distress.    PMH: Chronic HFrEF, CAD, PAF, moderate to severe aortic stenosis, mild to moderate AI, mild to moderate MR, hypertension, hyperlipidemia, diabetes, CKD 4, Alzheimer's, CVA    Review of Systems   Unable to perform ROS: Dementia     I have reviewed the patient's PMH, PSH, Social History, Family History, Meds, and Allergies    Objective :  Temp:  [97.9 °F (36.6 °C)] 97.9 °F (36.6 °C)  HR:  [59-81] 71  BP: (124-159)/(60-92) 151/85  Resp:  [18-30] 24  SpO2:  [96 %-99 %] 96 %  O2 Device: None (Room air)  Nasal Cannula O2 Flow Rate (L/min):  [3 L/min] 3 L/min  Orthostatic Blood Pressures      Flowsheet Row Most Recent Value   Blood Pressure 151/85 filed at 12/19/2024 0830   Patient Position - Orthostatic VS Lying filed at 12/19/2024 0600          First Weight: Weight - Scale: 103 kg (227 lb 1.2 oz) (12/18/24 1734)  Vitals:    12/18/24 1732  "  Weight: 103 kg (227 lb 1.2 oz)       Physical Exam  Vitals and nursing note reviewed. Exam conducted with a chaperone present.   Constitutional:       Appearance: Normal appearance.   HENT:      Head: Normocephalic and atraumatic.   Neck:      Comments: +jvd  Cardiovascular:      Rate and Rhythm: Normal rate. Rhythm irregular.      Pulses: Normal pulses.      Heart sounds: Murmur heard.   Pulmonary:      Effort: Pulmonary effort is normal.      Comments: +decreased  breath sounds, with bilateral crackles noted anteriorly  Musculoskeletal:         General: Swelling present. Normal range of motion.      Cervical back: Normal range of motion and neck supple.      Comments: +lower extremities wrapped, edema noted up to mid thigh   Skin:     General: Skin is warm and dry.   Neurological:      Mental Status: He is alert. Mental status is at baseline.   Psychiatric:      Comments: +baseline alzheimers           Lab Results: I have reviewed the following results:CBC/BMP:   .     12/19/24  0508   WBC 7.70   HGB 9.7*   HCT 32.3*   *   SODIUM 133*   K 4.0   CL 94*   CO2 33*   BUN 55*   CREATININE 2.40*   GLUC 166*   MG 2.2    , Creatinine Clearance: Estimated Creatinine Clearance: 26.1 mL/min (A) (by C-G formula based on SCr of 2.4 mg/dL (H))., LFTs:   .     12/18/24  1749   AST 14   ALT 16   ALB 3.5   TBILI 0.67   ALKPHOS 58    , PTT/INR:No new results in last 24 hours. , Troponin,BNP:  .     12/18/24  1749 12/18/24  1932   HSTNI0 95*  --    HSTNI2  --  93*   BNP 3,047*  --     , Lactic Acid: No new results in last 24 hours. , Procalcitonin: No results found for: \"PROCALCITONI\"  Results from last 7 days   Lab Units 12/19/24  0508 12/18/24  1749   WBC Thousand/uL 7.70 6.70   HEMOGLOBIN g/dL 9.7* 9.6*   HEMATOCRIT % 32.3* 31.0*   PLATELETS Thousands/uL 143* 147*     Results from last 7 days   Lab Units 12/19/24  0508 12/18/24  1749   POTASSIUM mmol/L 4.0 4.6   CHLORIDE mmol/L 94* 92*   CO2 mmol/L 33* 32   BUN mg/dL 55* " 59*   CREATININE mg/dL 2.40* 2.47*   CALCIUM mg/dL 8.9 8.8         Lab Results   Component Value Date    HGBA1C 8.5 (H) 12/06/2024     Lab Results   Component Value Date    TROPONINI 0.02 10/14/2021       Imaging Results Review: I reviewed radiology reports from this admission including: chest xray.  Other Study Results Review: EKG was reviewed.

## 2024-12-19 NOTE — ASSESSMENT & PLAN NOTE
Patient with bilateral lower extremity chronic wounds  Lower extremity Doppler to rule out DVT  Wound care consult  Nutrition consult  Discussed with RN, change dressing, upload picture in media, heel offload

## 2024-12-19 NOTE — ED PROVIDER NOTES
Time reflects when diagnosis was documented in both MDM as applicable and the Disposition within this note       Time User Action Codes Description Comment    12/18/2024  6:40 PM Mauricio Lozoya Add [I50.9] Acute exacerbation of CHF (congestive heart failure) (HCC)     12/18/2024  7:56 PM Mauricio Lozoya Add [R79.89] Elevated troponin     12/19/2024  9:41 AM Angie Jiang Add [N18.4] Stage 4 chronic kidney disease (HCC)     12/19/2024  9:49 AM Angie Jiang Add [L03.90] Wound cellulitis           ED Disposition       ED Disposition   Admit    Condition   Stable    Date/Time   Wed Dec 18, 2024  6:40 PM    Comment   Admit to [facility]               Assessment & Plan       Medical Decision Making  Patient is an 89-year-old male with past medical history significant for hypertension, diabetes, CKD 4 ,CHF with a EF of 35%, currently managed with Bumex therapy, presenting to the ED for evaluation of worsening shortness of breath, peripheral edema, with near 15 pound weight gain since discharge from the hospital on 12/12/2024. History and clinical exam documented below. Ddx: acute CHF, PNA, ACS, worsening CKD, metabolic disturbance, viral syndrome.    Workup concerning for pulmonary edema consistent with acute CHF, as well as worsening creatinine, elevated BNP.  Patient was given 80 mg of IV Lasix for diuresis in the ED.  Also concerning for elevated troponin which is likely secondary to demand from hypervolemia as EKG is without STEMI at this time.  Discussed all results with the son at bedside.  He understands the need for admission at this time and is agreeable. I reached out to cardiology service as patient is a readmission for heart failure and cardiology agrees with admission.  Patient will be admitted to the [facility] service with cardiology/nephrology consult.    Amount and/or Complexity of Data Reviewed  Labs: ordered. Decision-making details documented in ED Course.  Radiology: ordered and independent interpretation  performed.    Risk  Prescription drug management.  Decision regarding hospitalization.        ED Course as of 12/19/24 1200   Wed Dec 18, 2024   1746 EKG: AF at 66 bpm, normal axis, normal intervals,  nonspecific ST-T changes, no acute ST elevations as interpreted by me     1828 BNP(!): 3,047  Given Lasix 80 IV     1828 hs TnI 0hr(!): 95  Likely due to hypervolemia     1828 Creatinine(!): 2.47  2.01, 6 days ago     1837 Hemoglobin(!): 9.6  Worsened from baseline; no hx of melena, acute blood loss         Medications   acetaminophen (TYLENOL) tablet 650 mg (has no administration in time range)   insulin lispro (HumALOG/ADMELOG) 100 units/mL subcutaneous injection 1-5 Units (1 Units Subcutaneous Given 12/19/24 0911)   insulin lispro (HumALOG/ADMELOG) 100 units/mL subcutaneous injection 1-5 Units ( Subcutaneous Not Given 12/18/24 2204)   ALPRAZolam (XANAX) tablet 0.25 mg (0.25 mg Oral Given 12/19/24 0140)   apixaban (ELIQUIS) tablet 2.5 mg (2.5 mg Oral Given 12/19/24 0925)   aspirin (ECOTRIN LOW STRENGTH) EC tablet 81 mg (81 mg Oral Given 12/19/24 0925)   finasteride (PROSCAR) tablet 5 mg (5 mg Oral Given 12/19/24 0926)   gabapentin (NEURONTIN) capsule 100 mg (100 mg Oral Given 12/19/24 0925)   NIFEdipine (PROCARDIA XL) 24 hr tablet 60 mg (60 mg Oral Given 12/19/24 0925)   metoprolol succinate (TOPROL-XL) 24 hr tablet 50 mg (50 mg Oral Given 12/19/24 0925)   lisinopril (ZESTRIL) tablet 20 mg (20 mg Oral Given 12/19/24 0925)   bumetanide (BUMEX) injection 4 mg (4 mg Intravenous Given 12/19/24 0935)   furosemide (LASIX) injection 80 mg (80 mg Intravenous Given 12/18/24 1759)       ED Risk Strat Scores   HEART Risk Score      Flowsheet Row Most Recent Value   Heart Score Risk Calculator    History 0 Filed at: 12/19/2024 1142   ECG 1 Filed at: 12/19/2024 1142   Age 2 Filed at: 12/19/2024 1142   Risk Factors 2 Filed at: 12/19/2024 1142   Troponin 2 Filed at: 12/19/2024 1142   HEART Score 7 Filed at: 12/19/2024 1142           HEART Risk Score      Flowsheet Row Most Recent Value   Heart Score Risk Calculator    History 0 Filed at: 12/19/2024 1142   ECG 1 Filed at: 12/19/2024 1142   Age 2 Filed at: 12/19/2024 1142   Risk Factors 2 Filed at: 12/19/2024 1142   Troponin 2 Filed at: 12/19/2024 1142   HEART Score 7 Filed at: 12/19/2024 1142                                                History of Present Illness       Chief Complaint   Patient presents with    Chest Pain     Facility reports patient stating he had chest pain, though patient now denies.  Pt recently admitted for heart failure, has gained 16lbs since discharge one week ago       Past Medical History:   Diagnosis Date    Acute deep vein thrombosis (DVT) of brachial vein of left upper extremity (HCC) 11/24/2017    Acute deep vein thrombosis (DVT) of left peroneal vein ([facility])     Acute ST elevation myocardial infarction ([facility])     Aortic valve stenosis     Arthritis     Atrial fibrillation (HCC)     Basilar artery stenosis     Basilar artery stenosis     Basilar artery stenosis 05/04/2020    MRA Head wo contrast (12/13/2019)  IMPRESSION:   Multifocal intracranial atherosclerotic disease with moderate to severe stenosis at the origin of the left M1 segment with additional atherosclerotic disease noted in the distal right M1 and proximal inferior left M2 branches.   Moderate to severe stenosis in the proximal and mid basilar artery with nonvisualization of the right intradural vertebral    Benign prostatic hyperplasia with lower urinary tract symptoms     CAD (coronary artery disease)     CAD in native artery 11/24/2017    Underwent cardiac catheterization on 1/30/2020 and had mid and distal LAD stent placed  Cardiac PCI (1/30/2020)  SUMMARY   CORONARY CIRCULATION: Mid LAD: There was a 90 % stenosis at the site of a prior stent. Distal LAD: There was a 90 % stenosis at the site of a prior stent. Left posterior descending artery: There was a diffuse 50 % stenosis.   1ST LESION  INTERVENTIONS: A balloon angioplasty wit    Cerebrovascular small vessel disease 11/12/2020    Chronic kidney disease     Closed fracture of multiple ribs of left side with routine healing 09/03/2020    Coronary artery disease     Dementia (HCC)     Diabetes mellitus (HCC)     DM2 (diabetes mellitus, type 2) ([facility])     Elevated troponin 07/19/2021    Herpes zoster without complication 07/28/2021    History of CVA (cerebrovascular accident) 02/21/2019    History of DVT of peroneal vein left lower extremity 12/16/2019    History of transfusion     HLD (hyperlipidemia)     HTN (hypertension)     Infected sebaceous cyst of skin 06/08/2021    Lump in chest 06/01/2021    Middle cerebral artery stenosis     Mild to moderate aortic stenosis 10/09/2018    ECHO (7/2020)  AORTIC VALVE: Leaflets exhibited moderately increased thickness and moderate calcification. Transaortic velocity was increased due to valvular stenosis. There was mild to moderate stenosis. RICHY 1.4cm, mean pressure gradient 11mmHg, peak velocity 2.15m/s There was mild regurgitation.    Myocardial infarction ([facility])     Near syncope 07/19/2021    Other proteinuria 10/08/2019    Proteinuria     Rib fractures (right) 07/31/2020    Stroke ([facility])     Symptomatic bradycardia     Transient cerebral ischemia     Vitamin D deficiency       Past Surgical History:   Procedure Laterality Date    CARDIAC CATHETERIZATION      Outcome: successful; last assessed: 02/03/2015    CARDIAC CATHETERIZATION  11/26/2019    CORONARY ANGIOPLASTY WITH STENT PLACEMENT  2008    stent to LAD     CORONARY ARTERY BYPASS GRAFT      HAND SURGERY      thumb    HIP HARDWARE REMOVAL      JOINT REPLACEMENT      TOTAL HIP ARTHROPLASTY Right     TOTAL HIP ARTHROPLASTY Bilateral       Family History   Problem Relation Age of Onset    Emphysema Father     Emphysema Sister       Social History     Tobacco Use    Smoking status: Never     Passive exposure: Never    Smokeless tobacco: Never   Vaping Use     Vaping status: Never Used   Substance Use Topics    Alcohol use: Never    Drug use: Never      E-Cigarette/Vaping    E-Cigarette Use Never User       E-Cigarette/Vaping Substances    Nicotine No     THC No     CBD No     Flavoring No     Other No     Unknown No       I have reviewed and agree with the history as documented.     Patient is an 89-year-old male with past medical history significant for hypertension, diabetes, CHF with EF of 35%, multiple hospital admissions for heart failure, presenting to the ED for evaluation of increased peripheral edema, increased shortness of breath, with concern for near 15 pound weight gain since discharge from the hospital on 12/12/2024.  Patient's most recent hospitalization was for heart failure per the son at bedside.  He was switched from Lasix to Bumex therapy due to worsening kidney function with history of CKD 4..  Per the son, he believes the patient has received all of his medications, but over the last 6 days has noticed increased peripheral edema, increasing shortness of breath, prompting ED evaluation tonight.  Patient is alert, awake, able to contribute to history.  He denies any chest pain, endorses some shortness of breath with exertion, denies any traumatic falls or injuries.  Denies any abdominal pain, nausea, vomiting.        Review of Systems   All other systems reviewed and are negative.          Objective       ED Triage Vitals   Temperature Pulse Blood Pressure Respirations SpO2 Patient Position - Orthostatic VS   12/18/24 2307 12/18/24 1734 12/18/24 1734 12/18/24 1734 12/18/24 1734 12/18/24 2000   97.9 °F (36.6 °C) 65 159/71 (!) 24 98 % Lying      Temp Source Heart Rate Source BP Location FiO2 (%) Pain Score    12/18/24 2307 12/18/24 2000 12/19/24 0000 -- --    Oral Monitor Right arm        Vitals      Date and Time Temp Pulse SpO2 Resp BP Pain Score FACES Pain Rating User   12/19/24 1130 -- 80 97 % 28 153/80 -- -- JS   12/19/24 1030 -- 78 97 % 36 150/81  -- -- NN   12/19/24 0900 -- 97 -- 22 157/92 -- -- JS   12/19/24 0830 -- 71 96 % -- 151/85 -- -- KW   12/19/24 0600 -- 70 97 % 24 148/92 -- -- KL   12/19/24 0445 -- 66 98 % 24 -- -- -- KL   12/19/24 0400 -- 67 99 % 24 127/85 -- -- KL   12/19/24 0345 -- 69 99 % 27 -- -- -- KL   12/19/24 0200 -- 81 98 % 18 142/86 -- -- KL   12/19/24 0145 -- 62 97 % 30 -- -- -- KG   12/19/24 0115 -- 63 98 % 23 -- -- --    12/19/24 0000 -- 63 98 % 22 130/75 -- -- KL   12/18/24 2315 -- 61 98 % 22 -- -- --    12/18/24 2307 97.9 °F (36.6 °C) -- -- -- -- -- -- KL   12/18/24 2200 -- 64 98 % 26 124/60 -- -- TW   12/18/24 2000 -- 59 98 % 25 130/72 -- -- KG   12/18/24 1930 -- 60 98 % 25 131/71 -- -- TW   12/18/24 1900 -- 61 97 % 22 143/72 -- -- TW   12/18/24 1830 -- 63 98 % 25 130/69 -- -- TW   12/18/24 1734 -- 65 98 % 24 159/71 -- -- TW            Physical Exam  Vitals and nursing note reviewed.   Constitutional:       Appearance: He is well-developed. He is ill-appearing. He is not diaphoretic.   HENT:      Head: Normocephalic and atraumatic.   Eyes:      Conjunctiva/sclera: Conjunctivae normal.   Cardiovascular:      Rate and Rhythm: Normal rate and regular rhythm.      Pulses:           Radial pulses are 2+ on the right side and 2+ on the left side.      Heart sounds: Normal heart sounds.   Pulmonary:      Effort: Tachypnea and respiratory distress (mild) present.      Breath sounds: Examination of the right-middle field reveals decreased breath sounds. Examination of the left-middle field reveals decreased breath sounds. Examination of the right-lower field reveals decreased breath sounds and rales. Examination of the left-lower field reveals decreased breath sounds and rales. Decreased breath sounds and rales present.   Chest:      Chest wall: No mass, tenderness or edema.   Abdominal:      Palpations: Abdomen is soft.      Tenderness: There is no abdominal tenderness.   Musculoskeletal:         General: No swelling.      Cervical  back: Normal range of motion and neck supple.      Right lower leg: Edema present.      Left lower leg: Edema present.      Comments: Bilateral pitting edema up to knees     Skin:     General: Skin is warm and dry.      Capillary Refill: Capillary refill takes less than 2 seconds.   Neurological:      General: No focal deficit present.      Mental Status: He is alert and oriented to person, place, and time.   Psychiatric:         Mood and Affect: Mood normal.         Results Reviewed       Procedure Component Value Units Date/Time    Basic metabolic panel [[id number]]     Lab Status: No result Specimen: Blood     Blood gas, arterial [[id number]]     Lab Status: No result Specimen: Blood, Arterial     Albumin / creatinine urine ratio [[id number]] Collected: 12/19/24 1150    Lab Status: No result Specimen: Urine, Other     Sodium, urine, random [[id number]] Collected: 12/19/24 1150    Lab Status: No result Specimen: Urine, Other     Urea nitrogen, urine [[id number]] Collected: 12/19/24 1150    Lab Status: No result Specimen: Urine, Other     Urine Microscopic [[id number]]  (Normal) Collected: 12/19/24 1119    Lab Status: Final result Specimen: Urine, Indwelling Fox Catheter Updated: 12/19/24 1127     RBC, UA 1-2 /hpf      WBC, UA 1-2 /hpf      Epithelial Cells Occasional /hpf      Bacteria, UA None Seen /hpf     UA (URINE) with reflex to Scope [[id number]]  (Abnormal) Collected: 12/19/24 1119    Lab Status: Final result Specimen: Urine, Indwelling Fox Catheter Updated: 12/19/24 1126     Color, UA Light Yellow     Clarity, UA Clear     Specific Gravity, UA 1.010     pH, UA 6.5     Leukocytes, UA Negative     Nitrite, UA Negative     Protein, UA 30 (1+) mg/dl      Glucose, UA Negative mg/dl      Ketones, UA Negative mg/dl      Urobilinogen, UA <2.0 mg/dl      Bilirubin, UA Negative     Occult Blood, UA Trace    Fingerstick Glucose (POCT) [[id number]]  (Abnormal) Collected: 12/19/24 1123    Lab Status: Final result Specimen:  Blood Updated: 12/19/24 1124     POC Glucose 180 mg/dl     Fingerstick Glucose (POCT) [[id number]]  (Abnormal) Collected: 12/19/24 0653    Lab Status: Final result Specimen: Blood Updated: 12/19/24 0654     POC Glucose 154 mg/dl     Basic metabolic panel [[id number]]  (Abnormal) Collected: 12/19/24 0508    Lab Status: Final result Specimen: Blood from Arm, Right Updated: 12/19/24 0530     Sodium 133 mmol/L      Potassium 4.0 mmol/L      Chloride 94 mmol/L      CO2 33 mmol/L      ANION GAP 6 mmol/L      BUN 55 mg/dL      Creatinine 2.40 mg/dL      Glucose 166 mg/dL      Glucose, Fasting 166 mg/dL      Calcium 8.9 mg/dL      eGFR 23 ml/min/1.73sq m     Narrative:      National Kidney Disease Foundation guidelines for Chronic Kidney Disease (CKD):     Stage 1 with normal or high GFR (GFR > 90 mL/min/1.73 square meters)    Stage 2 Mild CKD (GFR = 60-89 mL/min/1.73 square meters)    Stage 3A Moderate CKD (GFR = 45-59 mL/min/1.73 square meters)    Stage 3B Moderate CKD (GFR = 30-44 mL/min/1.73 square meters)    Stage 4 Severe CKD (GFR = 15-29 mL/min/1.73 square meters)    Stage 5 End Stage CKD (GFR <15 mL/min/1.73 square meters)  Note: GFR calculation is accurate only with a steady state creatinine    Magnesium [[id number]]  (Normal) Collected: 12/19/24 0508    Lab Status: Final result Specimen: Blood from Arm, Right Updated: 12/19/24 0530     Magnesium 2.2 mg/dL     CBC and differential [[id number]]  (Abnormal) Collected: 12/19/24 0508    Lab Status: Final result Specimen: Blood from Arm, Right Updated: 12/19/24 0516     WBC 7.70 Thousand/uL      RBC 3.26 Million/uL      Hemoglobin 9.7 g/dL      Hematocrit 32.3 %      MCV 99 fL      MCH 29.8 pg      MCHC 30.0 g/dL      RDW 14.6 %      MPV 10.8 fL      Platelets 143 Thousands/uL      nRBC 0 /100 WBCs      Segmented % 65 %      Immature Grans % 0 %      Lymphocytes % 24 %      Monocytes % 6 %      Eosinophils Relative 4 %      Basophils Relative 1 %      Absolute Neutrophils  5.05 Thousands/µL      Absolute Immature Grans 0.02 Thousand/uL      Absolute Lymphocytes 1.82 Thousands/µL      Absolute Monocytes 0.47 Thousand/µL      Eosinophils Absolute 0.30 Thousand/µL      Basophils Absolute 0.04 Thousands/µL     HS Troponin I 4hr [[id number]]  (Abnormal) Collected: 12/18/24 2222    Lab Status: Final result Specimen: Blood from Arm, Right Updated: 12/18/24 2250     hs TnI 4hr 93 ng/L      Delta 4hr hsTnI -2 ng/L     Fingerstick Glucose (POCT) [[id number]]  (Abnormal) Collected: 12/18/24 2137    Lab Status: Final result Specimen: Blood Updated: 12/18/24 2138     POC Glucose 220 mg/dl     FLU/COVID Rapid Antigen (30 min. TAT) - Preferred screening test in ED [[id number]]  (Normal) Collected: 12/18/24 1749    Lab Status: Final result Specimen: Nares from Nose Updated: 12/18/24 2054     SARS COV Rapid Antigen Negative     Influenza A Rapid Antigen Negative     Influenza B Rapid Antigen Negative    Narrative:      This test has been performed using the Quidel Denisse 2 FLU+SARS Antigen test under the Emergency Use Authorization (EUA). This test has been validated by the  and verified by the performing laboratory. The Denisse uses lateral flow immunofluorescent sandwich assay to detect SARS-COV, Influenza A and Influenza B Antigen.     The Quidel Denisse 2 SARS Antigen test does not differentiate between SARS-CoV and SARS-CoV-2.     Negative results are presumptive and may be confirmed with a molecular assay, if necessary, for patient management. Negative results do not rule out SARS-CoV-2 or influenza infection and should not be used as the sole basis for treatment or patient management decisions. A negative test result may occur if the level of antigen in a sample is below the limit of detection of this test.     Positive results are indicative of the presence of viral antigens, but do not rule out bacterial infection or co-infection with other viruses.     All test results should be used as  an adjunct to clinical observations and other information available to the provider.    FOR PEDIATRIC PATIENTS - copy/paste COVID Guidelines URL to browser: [email or web address]    HS Troponin I 2hr [[id number]]  (Abnormal) Collected: 12/18/24 1932    Lab Status: Final result Specimen: Blood Updated: 12/18/24 2003     hs TnI 2hr 93 ng/L      Delta 2hr hsTnI -2 ng/L     HS Troponin 0hr (reflex protocol) [[id number]]  (Abnormal) Collected: 12/18/24 1749    Lab Status: Final result Specimen: Blood from Arm, Right Updated: 12/18/24 1823     hs TnI 0hr 95 ng/L     B-Type Natriuretic Peptide(BNP) [[id number]]  (Abnormal) Collected: 12/18/24 1749    Lab Status: Final result Specimen: Blood from Arm, Right Updated: 12/18/24 1821     BNP 3,047 pg/mL     Comprehensive metabolic panel [[id number]]  (Abnormal) Collected: 12/18/24 1749    Lab Status: Final result Specimen: Blood from Arm, Right Updated: 12/18/24 1815     Sodium 131 mmol/L      Potassium 4.6 mmol/L      Chloride 92 mmol/L      CO2 32 mmol/L      ANION GAP 7 mmol/L      BUN 59 mg/dL      Creatinine 2.47 mg/dL      Glucose 333 mg/dL      Calcium 8.8 mg/dL      AST 14 U/L      ALT 16 U/L      Alkaline Phosphatase 58 U/L      Total Protein 6.9 g/dL      Albumin 3.5 g/dL      Total Bilirubin 0.67 mg/dL      eGFR 22 ml/min/1.73sq m     Narrative:      National Kidney Disease Foundation guidelines for Chronic Kidney Disease (CKD):     Stage 1 with normal or high GFR (GFR > 90 mL/min/1.73 square meters)    Stage 2 Mild CKD (GFR = 60-89 mL/min/1.73 square meters)    Stage 3A Moderate CKD (GFR = 45-59 mL/min/1.73 square meters)    Stage 3B Moderate CKD (GFR = 30-44 mL/min/1.73 square meters)    Stage 4 Severe CKD (GFR = 15-29 mL/min/1.73 square meters)    Stage 5 End Stage CKD (GFR <15 mL/min/1.73 square meters)  Note: GFR calculation is accurate only with a steady state creatinine    CBC and differential [[id number]]   (Abnormal) Collected: 12/18/24 1749    Lab Status: Final result Specimen: Blood from Arm, Right Updated: 12/18/24 1758     WBC 6.70 Thousand/uL      RBC 3.18 Million/uL      Hemoglobin 9.6 g/dL      Hematocrit 31.0 %      MCV 98 fL      MCH 30.2 pg      MCHC 31.0 g/dL      RDW 14.6 %      MPV 11.0 fL      Platelets 147 Thousands/uL      nRBC 0 /100 WBCs      Segmented % 74 %      Immature Grans % 0 %      Lymphocytes % 16 %      Monocytes % 6 %      Eosinophils Relative 3 %      Basophils Relative 1 %      Absolute Neutrophils 5.03 Thousands/µL      Absolute Immature Grans 0.02 Thousand/uL      Absolute Lymphocytes 1.04 Thousands/µL      Absolute Monocytes 0.40 Thousand/µL      Eosinophils Absolute 0.17 Thousand/µL      Basophils Absolute 0.04 Thousands/µL             XR chest 2 views   ED Interpretation by Mauricio Lozoya DO (12/18 1808)   Diffuse pulmonary vascular congestion as interpreted by me        Final Interpretation by Ifeanyi Castaneda MD (12/19 0820)      Mild pulmonary vascular congestion.      Clinical provider is aware of the findings.            Workstation performed: [id number]          VAS VENOUS DUPLEX - LOWER LIMB BILATERAL    (Results Pending)       Procedures    ED Medication and Procedure Management   Prior to Admission Medications   Prescriptions Last Dose Informant Patient Reported? Taking?   ALPRAZolam (NIRAVAM) 0.25 MG dissolvable tablet  Child, Self Yes No   Sig: Take 0.25 mg by mouth daily at bedtime as needed for anxiety   Blood Glucose Monitoring Suppl (ONE TOUCH ULTRA MINI) w/Device KIT  Child, Self No No   Sig: Use daily   NIFEdipine (PROCARDIA XL) 30 mg 24 hr tablet   No No   Sig: Take 2 tablets (60 mg total) by mouth daily   Omega-3 Fatty Acids (FISH OIL CONCENTRATE PO)  Child, Self Yes No   Sig: Take 1 tablet by mouth daily   acetaminophen (TYLENOL) 500 mg tablet  Child, Self Yes No   Sig: Take 500 mg by mouth every 6 (six) hours as needed for mild pain   apixaban  (ELIQUIS) 2.5 mg  Child, Self No No   Sig: Take 1 tablet (2.5 mg total) by mouth 2 (two) times a day   aspirin (ECOTRIN LOW STRENGTH) 81 mg EC tablet  Child, Self No No   Sig: Take 1 tablet (81 mg total) by mouth daily   bumetanide (BUMEX) 2 mg tablet   No No   Sig: Take 1 tablet (2 mg total) by mouth 2 (two) times a day   cholecalciferol (VITAMIN D3) 1,000 units tablet  Child, Self Yes No   Sig: Take 1,000 Units by mouth daily   finasteride (PROSCAR) 5 mg tablet  Child, Self No No   Sig: TAKE 1 TABLET BY MOUTH DAILY   gabapentin (NEURONTIN) 100 mg capsule  Child, Self No No   Sig: TAKE 1 CAPSULE BY MOUTH TWICE  DAILY   glipiZIDE (GLUCOTROL XL) 2.5 mg 24 hr tablet  Child, Self No No   Sig: TAKE 1 TABLET BY MOUTH DAILY   glucose blood (OneTouch Ultra) test strip  Child, Self No No   Sig: Check blood sugars once daily. Please substitute with appropriate alternative as covered by patient's insurance. Dx: E11.65   lisinopril (ZESTRIL) 20 mg tablet  Child, Self No No   Sig: TAKE 1 TABLET BY MOUTH DAILY   metoprolol succinate (TOPROL-XL) 50 mg 24 hr tablet   No No   Sig: Take 1 tablet (50 mg total) by mouth daily   vitamin B-12 (CYANOCOBALAMIN) 100 MCG tablet  Child, Self Yes No   Sig: Take 1,000 mcg by mouth daily      Facility-Administered Medications: None     Patient's Medications   Discharge Prescriptions    No medications on file     No discharge procedures on file.  ED SEPSIS DOCUMENTATION   Time reflects when diagnosis was documented in both MDM as applicable and the Disposition within this note       Time User Action Codes Description Comment    12/18/2024  6:40 PM Mauricio Lozoya [I50.9] Acute exacerbation of CHF (congestive heart failure) (HCC)     12/18/2024  7:56 PM Mauricio Lozoya [R79.89] Elevated troponin     12/19/2024  9:41 AM Angie Jiang Add [N18.4] Stage 4 chronic kidney disease (HCC)     12/19/2024  9:49 AM Angie Jiang [L03.90] Wound cellulitis                  Mauricio Andrews  Devora,   12/19/24 1200

## 2024-12-19 NOTE — H&P
H&P - Hospitalist   Name: Tom Stewart 89 y.o. male I MRN: [id number]  Unit/Bed#: ED 27 I Date of Admission: 12/18/2024   Date of Service: 12/18/2024 I Hospital Day: 0     Assessment & Plan  Acute on chronic heart failure with preserved ejection fraction (HCC)  Wt Readings from Last 3 Encounters:   12/18/24 103 kg (227 lb 1.2 oz)   12/11/24 96.1 kg (211 lb 13.8 oz)   12/02/24 96.6 kg (213 lb)     82-year-old male with history of CHF recently discharged approximately 1 week ago after acute CHF exacerbation reports worsening shortness of breath and peripheral edema  At home on Bumex twice daily  Will place on IV Lasix  Recent echo revealed EF of 35% on December 8th  Will consult cardiology for further evaluation        Paroxysmal atrial fibrillation (HCC)  Currently rate controlled  Continue beta-blocker  Continue Eliquis  Type 2 diabetes mellitus with stage 4 chronic kidney disease, without long-term current use of insulin (HCC)  Hold p.o. glipizide  Ssi  Monitor BG  Stage 4 chronic kidney disease (HCC)  Baseline creatinine approximately 1.8-2.2  Presented with a creatinine of 2.4  Will monitor closely while on IV diuretics  Primary hypertension  BP currently controlled  Continue nifedipine, lisinopril, metoprolol  Other Alzheimer's disease (HCC)  Currently at baseline mental status  Delirium precautions      VTE Pharmacologic Prophylaxis:   Moderate Risk (Score 3-4) - Pharmacological DVT Prophylaxis Ordered: apixaban (Eliquis).  Code Status: full code  Discussion with family: Patient declined call to .     Anticipated Length of Stay: Patient will be admitted on an observation basis with an anticipated length of stay of less than 2 midnights secondary to acute chf.    History of Present Illness   Chief Complaint: SOB    Tom Stewart is a 89 y.o. male with past medical history significant diabetes, CKD, hypertension, heart failure with reduced ejection fraction initially presented with worsening  shortness of breath and peripheral edema.  Reports worsening symptoms for the past 5 days.  Reports approximately 15 pound weight gain since discharge.  Reports worsening shortness of breath with exertion.  Otherwise denies any acute complaints.  Denies chest pain, fevers, chills, abdominal pain, nausea, vomiting, diarrhea or any other complaints    Review of Systems   Constitutional:  Negative for appetite change, chills, diaphoresis, fatigue, fever and unexpected weight change.   HENT:  Negative for congestion, rhinorrhea and sore throat.    Eyes:  Negative for photophobia and visual disturbance.   Respiratory:  Positive for shortness of breath. Negative for cough and wheezing.    Cardiovascular:  Positive for leg swelling. Negative for chest pain and palpitations.   Gastrointestinal:  Negative for abdominal pain, anal bleeding, blood in stool, constipation, diarrhea, nausea and vomiting.   Genitourinary:  Negative for decreased urine volume, difficulty urinating, dysuria, flank pain, frequency, hematuria and urgency.   Musculoskeletal:  Negative for arthralgias, back pain, joint swelling and myalgias.   Skin:  Negative for color change and rash.   Neurological:  Negative for dizziness, seizures, facial asymmetry, speech difficulty, numbness and headaches.   Psychiatric/Behavioral:  Negative for agitation, confusion and decreased concentration. The patient is not nervous/anxious.        Historical Information   Past Medical History:   Diagnosis Date    Acute deep vein thrombosis (DVT) of brachial vein of left upper extremity (HCC) 11/24/2017    Acute deep vein thrombosis (DVT) of left peroneal vein ([facility])     Acute ST elevation myocardial infarction (HCC)     Aortic valve stenosis     Arthritis     Atrial fibrillation (HCC)     Basilar artery stenosis     Basilar artery stenosis     Basilar artery stenosis 05/04/2020    MRA Head wo contrast (12/13/2019)  IMPRESSION:   Multifocal intracranial atherosclerotic disease  with moderate to severe stenosis at the origin of the left M1 segment with additional atherosclerotic disease noted in the distal right M1 and proximal inferior left M2 branches.   Moderate to severe stenosis in the proximal and mid basilar artery with nonvisualization of the right intradural vertebral    Benign prostatic hyperplasia with lower urinary tract symptoms     CAD (coronary artery disease)     CAD in native artery 11/24/2017    Underwent cardiac catheterization on 1/30/2020 and had mid and distal LAD stent placed  Cardiac PCI (1/30/2020)  SUMMARY   CORONARY CIRCULATION: Mid LAD: There was a 90 % stenosis at the site of a prior stent. Distal LAD: There was a 90 % stenosis at the site of a prior stent. Left posterior descending artery: There was a diffuse 50 % stenosis.   1ST LESION INTERVENTIONS: A balloon angioplasty wit    Cerebrovascular small vessel disease 11/12/2020    Chronic kidney disease     Closed fracture of multiple ribs of left side with routine healing 09/03/2020    Coronary artery disease     Dementia (HCC)     Diabetes mellitus (HCC)     DM2 (diabetes mellitus, type 2) (HCC)     Elevated troponin 07/19/2021    Herpes zoster without complication 07/28/2021    History of CVA (cerebrovascular accident) 02/21/2019    History of DVT of peroneal vein left lower extremity 12/16/2019    History of transfusion     HLD (hyperlipidemia)     HTN (hypertension)     Infected sebaceous cyst of skin 06/08/2021    Lump in chest 06/01/2021    Middle cerebral artery stenosis     Mild to moderate aortic stenosis 10/09/2018    ECHO (7/2020)  AORTIC VALVE: Leaflets exhibited moderately increased thickness and moderate calcification. Transaortic velocity was increased due to valvular stenosis. There was mild to moderate stenosis. RICHY 1.4cm, mean pressure gradient 11mmHg, peak velocity 2.15m/s There was mild regurgitation.    Myocardial infarction (HCC)     Near syncope 07/19/2021    Other proteinuria 10/08/2019     Proteinuria     Rib fractures (right) 07/31/2020    Stroke (HCC)     Symptomatic bradycardia     Transient cerebral ischemia     Vitamin D deficiency      Past Surgical History:   Procedure Laterality Date    CARDIAC CATHETERIZATION      Outcome: successful; last assessed: 02/03/2015    CARDIAC CATHETERIZATION  11/26/2019    CORONARY ANGIOPLASTY WITH STENT PLACEMENT  2008    stent to LAD     CORONARY ARTERY BYPASS GRAFT      HAND SURGERY      thumb    HIP HARDWARE REMOVAL      JOINT REPLACEMENT      TOTAL HIP ARTHROPLASTY Right     TOTAL HIP ARTHROPLASTY Bilateral      Social History     Tobacco Use    Smoking status: Never     Passive exposure: Never    Smokeless tobacco: Never   Vaping Use    Vaping status: Never Used   Substance and Sexual Activity    Alcohol use: Never    Drug use: Never    Sexual activity: Not Currently     Partners: Female     Birth control/protection: None     E-Cigarette/Vaping    E-Cigarette Use Never User      E-Cigarette/Vaping Substances    Nicotine No     THC No     CBD No     Flavoring No     Other No     Unknown No      Family History   Problem Relation Age of Onset    Emphysema Father     Emphysema Sister      Social History:  Marital Status:      Patient Pre-hospital Living Situation: Home  Patient Pre-hospital Level of Mobility:  w assistance  Patient Pre-hospital Diet Restrictions: dm, chf    Meds/Allergies   I have reviewed home medications with patient personally.  Prior to Admission medications    Medication Sig Start Date End Date Taking? Authorizing Provider   acetaminophen (TYLENOL) 500 mg tablet Take 500 mg by mouth every 6 (six) hours as needed for mild pain    Historical Provider, MD   ALPRAZolam (NIRAVAM) 0.25 MG dissolvable tablet Take 0.25 mg by mouth daily at bedtime as needed for anxiety    Historical Provider, MD   apixaban (ELIQUIS) 2.5 mg Take 1 tablet (2.5 mg total) by mouth 2 (two) times a day 1/10/24   Katlin Sweeney MD   aspirin (ECOTRIN LOW  STRENGTH) 81 mg EC tablet Take 1 tablet (81 mg total) by mouth daily 11/30/21   Katlin Sweeney MD   Blood Glucose Monitoring Suppl (ONE TOUCH ULTRA MINI) w/Device KIT Use daily 11/12/23   [facility], DO   bumetanide (BUMEX) 2 mg tablet Take 1 tablet (2 mg total) by mouth 2 (two) times a day 12/12/24   JALEEL Healy   cholecalciferol (VITAMIN D3) 1,000 units tablet Take 1,000 Units by mouth daily    Historical Provider, MD   finasteride (PROSCAR) 5 mg tablet TAKE 1 TABLET BY MOUTH DAILY 7/16/24   [facility], DO   gabapentin (NEURONTIN) 100 mg capsule TAKE 1 CAPSULE BY MOUTH TWICE  DAILY 9/26/24   [facility], DO   glipiZIDE (GLUCOTROL XL) 2.5 mg 24 hr tablet TAKE 1 TABLET BY MOUTH DAILY 6/12/24   [facility], DO   glucose blood (OneTouch Ultra) test strip Check blood sugars once daily. Please substitute with appropriate alternative as covered by patient's insurance. Dx: E11.65 3/7/24   [facility], DO   lisinopril (ZESTRIL) 20 mg tablet TAKE 1 TABLET BY MOUTH DAILY 11/9/23   [facility], DO   metoprolol succinate (TOPROL-XL) 50 mg 24 hr tablet Take 1 tablet (50 mg total) by mouth daily 12/13/24   JALEEL Healy   NIFEdipine (PROCARDIA XL) 30 mg 24 hr tablet Take 2 tablets (60 mg total) by mouth daily 12/13/24   JALEEL Healy   Omega-3 Fatty Acids (FISH OIL CONCENTRATE PO) Take 1 tablet by mouth daily    Historical Provider, MD   vitamin B-12 (CYANOCOBALAMIN) 100 MCG tablet Take 1,000 mcg by mouth daily    Historical Provider, MD     Allergies   Allergen Reactions    Celecoxib Other (See Comments) and Hives     Per pt does NOT remember type of reaction or severity level    Hydromorphone Other (See Comments) and Hives     Per pt does NOT remember type of reaction or severity level    Pravastatin Hives    Statins Other (See Comments) and Hives     Per pt does NOT remember type of reaction or severity level       Objective :  HR:  [60-65] 60  BP: (130-159)/(69-72)  131/71  Resp:  [22-25] 25  SpO2:  [97 %-98 %] 98 %  O2 Device: Nasal cannula  Nasal Cannula O2 Flow Rate (L/min):  [3 L/min] 3 L/min    Physical Exam  Constitutional:       General: He is not in acute distress.     Appearance: He is well-developed. He is not diaphoretic.   HENT:      Head: Normocephalic and atraumatic.      Nose: Nose normal.      Mouth/Throat:      Pharynx: No oropharyngeal exudate.   Eyes:      General: No scleral icterus.     Conjunctiva/sclera: Conjunctivae normal.   Cardiovascular:      Rate and Rhythm: Normal rate and regular rhythm.      Heart sounds: Normal heart sounds. No murmur heard.     No friction rub. No gallop.   Pulmonary:      Effort: Pulmonary effort is normal. No respiratory distress.      Breath sounds: Normal breath sounds. No wheezing or rales.   Chest:      Chest wall: No tenderness.   Abdominal:      General: Bowel sounds are normal. There is no distension.      Palpations: Abdomen is soft.      Tenderness: There is no abdominal tenderness. There is no guarding.   Musculoskeletal:         General: No tenderness or deformity. Normal range of motion.      Cervical back: Normal range of motion and neck supple.   Skin:     General: Skin is warm and dry.      Findings: No erythema.   Neurological:      Mental Status: He is alert. Mental status is at baseline.          Lines/Drains:            Lab Results: I have reviewed the following results:  Results from last 7 days   Lab Units 12/18/24  1749   WBC Thousand/uL 6.70   HEMOGLOBIN g/dL 9.6*   HEMATOCRIT % 31.0*   PLATELETS Thousands/uL 147*   SEGS PCT % 74   LYMPHO PCT % 16   MONO PCT % 6   EOS PCT % 3     Results from last 7 days   Lab Units 12/18/24  1749   SODIUM mmol/L 131*   POTASSIUM mmol/L 4.6   CHLORIDE mmol/L 92*   CO2 mmol/L 32   BUN mg/dL 59*   CREATININE mg/dL 2.47*   ANION GAP mmol/L 7   CALCIUM mg/dL 8.8   ALBUMIN g/dL 3.5   TOTAL BILIRUBIN mg/dL 0.67   ALK PHOS U/L 58   ALT U/L 16   AST U/L 14   GLUCOSE RANDOM  mg/dL 333*         Results from last 7 days   Lab Units 12/12/24  1113 12/12/24  0735 12/11/24  2116   POC GLUCOSE mg/dl 232* 181* 388*     Lab Results   Component Value Date    HGBA1C 8.5 (H) 12/06/2024    HGBA1C 7.1 (H) 06/11/2024    HGBA1C 7.2 (H) 01/30/2024           Imaging Results Review: I personally reviewed the following image studies/reports in PACS and discussed pertinent findings with Radiology: chest xray. My interpretation of the radiology images/reports is:  .  Other Study Results Review: No additional pertinent studies reviewed.    Administrative Statements   I have spent a total time of 76 minutes in caring for this patient on the day of the visit/encounter including Diagnostic results.    ** Please Note: This note has been constructed using a voice recognition system. **

## 2024-12-19 NOTE — ASSESSMENT & PLAN NOTE
Wt Readings from Last 3 Encounters:   12/18/24 103 kg (227 lb 1.2 oz)   12/11/24 96.1 kg (211 lb 13.8 oz)   12/02/24 96.6 kg (213 lb)   +hypervolemic  Previously on Bumex 2 mg twice daily  Currently with lackluster urinary output on IV Lasix  Switch over to Bumex 4 mg IV twice daily; if urine output is still minimal will need to consider Bumex drip  Continue Eliquis, aspirin, lisinopril, Toprol, nifedipine  Monitor BMP closely, plan for BMP at 2 PM  Daily weights, salt restriction  Strict I's and O's  Patient with urinary catheter in place  Last echo 12/8/2024 EF 35%, moderate global hypokinesis with regional variation, ungraded diastolic dysfunction, RV dilated, biatrial dilation, aortic valve thickened, calcified with reduced mobility, moderate to severe AS, gradients 42/26, RICHY 0.79 cm², mild MAC, mild to moderate MR, mild TR, mild to moderate pulmonary hypertension with RVSP 46mmhg, trace MS, IVC dilated

## 2024-12-20 ENCOUNTER — APPOINTMENT (INPATIENT)
Dept: CT IMAGING | Facility: HOSPITAL | Age: 89
DRG: 291 | End: 2024-12-20
Payer: MEDICARE

## 2024-12-20 LAB
ANION GAP SERPL CALCULATED.3IONS-SCNC: 6 MMOL/L (ref 4–13)
ATRIAL RATE: 58 BPM
BASE EX.OXY STD BLDV CALC-SCNC: 75.8 % (ref 60–80)
BASE EXCESS BLDV CALC-SCNC: 5.7 MMOL/L
BASOPHILS # BLD AUTO: 0.06 THOUSANDS/ÂΜL (ref 0–0.1)
BASOPHILS NFR BLD AUTO: 1 % (ref 0–1)
BUN SERPL-MCNC: 58 MG/DL (ref 5–25)
CALCIUM SERPL-MCNC: 8.9 MG/DL (ref 8.4–10.2)
CHLORIDE SERPL-SCNC: 94 MMOL/L (ref 96–108)
CO2 SERPL-SCNC: 34 MMOL/L (ref 21–32)
CREAT SERPL-MCNC: 2.35 MG/DL (ref 0.6–1.3)
EOSINOPHIL # BLD AUTO: 0.16 THOUSAND/ÂΜL (ref 0–0.61)
EOSINOPHIL NFR BLD AUTO: 2 % (ref 0–6)
ERYTHROCYTE [DISTWIDTH] IN BLOOD BY AUTOMATED COUNT: 14.4 % (ref 11.6–15.1)
GFR SERPL CREATININE-BSD FRML MDRD: 23 ML/MIN/1.73SQ M
GLUCOSE SERPL-MCNC: 169 MG/DL (ref 65–140)
GLUCOSE SERPL-MCNC: 174 MG/DL (ref 65–140)
GLUCOSE SERPL-MCNC: 180 MG/DL (ref 65–140)
GLUCOSE SERPL-MCNC: 184 MG/DL (ref 65–140)
GLUCOSE SERPL-MCNC: 227 MG/DL (ref 65–140)
HCO3 BLDV-SCNC: 32.2 MMOL/L (ref 24–30)
HCT VFR BLD AUTO: 31.2 % (ref 36.5–49.3)
HGB BLD-MCNC: 9.6 G/DL (ref 12–17)
IMM GRANULOCYTES # BLD AUTO: 0.03 THOUSAND/UL (ref 0–0.2)
IMM GRANULOCYTES NFR BLD AUTO: 0 % (ref 0–2)
LYMPHOCYTES # BLD AUTO: 1.43 THOUSANDS/ÂΜL (ref 0.6–4.47)
LYMPHOCYTES NFR BLD AUTO: 21 % (ref 14–44)
MCH RBC QN AUTO: 29.9 PG (ref 26.8–34.3)
MCHC RBC AUTO-ENTMCNC: 30.8 G/DL (ref 31.4–37.4)
MCV RBC AUTO: 97 FL (ref 82–98)
MONOCYTES # BLD AUTO: 0.46 THOUSAND/ÂΜL (ref 0.17–1.22)
MONOCYTES NFR BLD AUTO: 7 % (ref 4–12)
MRSA NOSE QL CULT: NORMAL
NEUTROPHILS # BLD AUTO: 4.82 THOUSANDS/ÂΜL (ref 1.85–7.62)
NEUTS SEG NFR BLD AUTO: 69 % (ref 43–75)
NRBC BLD AUTO-RTO: 0 /100 WBCS
O2 CT BLDV-SCNC: 12.5 ML/DL
PCO2 BLDV: 56.1 MM HG (ref 42–50)
PH BLDV: 7.38 [PH] (ref 7.3–7.4)
PLATELET # BLD AUTO: 142 THOUSANDS/UL (ref 149–390)
PMV BLD AUTO: 10.8 FL (ref 8.9–12.7)
PO2 BLDV: 44.5 MM HG (ref 35–45)
POTASSIUM SERPL-SCNC: 4.1 MMOL/L (ref 3.5–5.3)
QRS AXIS: 100 DEGREES
QRSD INTERVAL: 160 MS
QT INTERVAL: 428 MS
QTC INTERVAL: 448 MS
RBC # BLD AUTO: 3.21 MILLION/UL (ref 3.88–5.62)
SODIUM SERPL-SCNC: 134 MMOL/L (ref 135–147)
T WAVE AXIS: 209 DEGREES
VENTRICULAR RATE: 66 BPM
WBC # BLD AUTO: 6.96 THOUSAND/UL (ref 4.31–10.16)

## 2024-12-20 PROCEDURE — 82948 REAGENT STRIP/BLOOD GLUCOSE: CPT

## 2024-12-20 PROCEDURE — 99233 SBSQ HOSP IP/OBS HIGH 50: CPT | Performed by: INTERNAL MEDICINE

## 2024-12-20 PROCEDURE — 80048 BASIC METABOLIC PNL TOTAL CA: CPT | Performed by: INTERNAL MEDICINE

## 2024-12-20 PROCEDURE — 94664 DEMO&/EVAL PT USE INHALER: CPT

## 2024-12-20 PROCEDURE — 85025 COMPLETE CBC W/AUTO DIFF WBC: CPT | Performed by: INTERNAL MEDICINE

## 2024-12-20 PROCEDURE — 99232 SBSQ HOSP IP/OBS MODERATE 35: CPT | Performed by: INTERNAL MEDICINE

## 2024-12-20 PROCEDURE — 71250 CT THORAX DX C-: CPT

## 2024-12-20 PROCEDURE — 82805 BLOOD GASES W/O2 SATURATION: CPT | Performed by: INTERNAL MEDICINE

## 2024-12-20 PROCEDURE — 99232 SBSQ HOSP IP/OBS MODERATE 35: CPT | Performed by: STUDENT IN AN ORGANIZED HEALTH CARE EDUCATION/TRAINING PROGRAM

## 2024-12-20 PROCEDURE — 93010 ELECTROCARDIOGRAM REPORT: CPT | Performed by: STUDENT IN AN ORGANIZED HEALTH CARE EDUCATION/TRAINING PROGRAM

## 2024-12-20 RX ORDER — DOCUSATE SODIUM 100 MG/1
100 CAPSULE, LIQUID FILLED ORAL 2 TIMES DAILY
Status: DISCONTINUED | OUTPATIENT
Start: 2024-12-20 | End: 2024-12-24 | Stop reason: HOSPADM

## 2024-12-20 RX ORDER — SENNOSIDES 8.6 MG
1 TABLET ORAL DAILY PRN
Status: DISCONTINUED | OUTPATIENT
Start: 2024-12-20 | End: 2024-12-24 | Stop reason: HOSPADM

## 2024-12-20 RX ADMIN — SENNOSIDES 8.6 MG: 8.6 TABLET, FILM COATED ORAL at 06:01

## 2024-12-20 RX ADMIN — ASPIRIN 81 MG: 81 TABLET, COATED ORAL at 09:32

## 2024-12-20 RX ADMIN — INSULIN LISPRO 2 UNITS: 100 INJECTION, SOLUTION INTRAVENOUS; SUBCUTANEOUS at 12:29

## 2024-12-20 RX ADMIN — BUMETANIDE 4 MG: 0.25 INJECTION INTRAMUSCULAR; INTRAVENOUS at 18:04

## 2024-12-20 RX ADMIN — APIXABAN 2.5 MG: 2.5 TABLET, FILM COATED ORAL at 09:32

## 2024-12-20 RX ADMIN — METOPROLOL SUCCINATE 50 MG: 50 TABLET, EXTENDED RELEASE ORAL at 09:32

## 2024-12-20 RX ADMIN — GABAPENTIN 100 MG: 100 CAPSULE ORAL at 18:04

## 2024-12-20 RX ADMIN — ACETAMINOPHEN 650 MG: 325 TABLET ORAL at 06:01

## 2024-12-20 RX ADMIN — INSULIN LISPRO 1 UNITS: 100 INJECTION, SOLUTION INTRAVENOUS; SUBCUTANEOUS at 18:01

## 2024-12-20 RX ADMIN — GABAPENTIN 100 MG: 100 CAPSULE ORAL at 09:32

## 2024-12-20 RX ADMIN — BUMETANIDE 4 MG: 0.25 INJECTION INTRAMUSCULAR; INTRAVENOUS at 09:32

## 2024-12-20 RX ADMIN — DOCUSATE SODIUM 100 MG: 100 CAPSULE, LIQUID FILLED ORAL at 09:32

## 2024-12-20 RX ADMIN — APIXABAN 2.5 MG: 2.5 TABLET, FILM COATED ORAL at 18:04

## 2024-12-20 RX ADMIN — INSULIN LISPRO 1 UNITS: 100 INJECTION, SOLUTION INTRAVENOUS; SUBCUTANEOUS at 21:36

## 2024-12-20 RX ADMIN — NIFEDIPINE 60 MG: 30 TABLET, FILM COATED, EXTENDED RELEASE ORAL at 09:32

## 2024-12-20 RX ADMIN — DOCUSATE SODIUM 100 MG: 100 CAPSULE, LIQUID FILLED ORAL at 18:04

## 2024-12-20 RX ADMIN — LISINOPRIL 20 MG: 20 TABLET ORAL at 09:32

## 2024-12-20 RX ADMIN — CHLOROTHIAZIDE SODIUM 500 MG: 500 INJECTION, POWDER, LYOPHILIZED, FOR SOLUTION INTRAVENOUS at 12:29

## 2024-12-20 RX ADMIN — INSULIN LISPRO 1 UNITS: 100 INJECTION, SOLUTION INTRAVENOUS; SUBCUTANEOUS at 08:05

## 2024-12-20 RX ADMIN — FINASTERIDE 5 MG: 5 TABLET, FILM COATED ORAL at 09:32

## 2024-12-20 NOTE — DISCHARGE INSTR - OTHER ORDERS
Skin care plans:  1-Hydraguard/Silicone Cream to bilateral sacrum, buttock, and heels BID and PRN  2-Elevate heels to offload pressure.  3-Ehob cushion in chair when out of bed.  4-Moisturize skin daily with skin nourishing cream.  5-Turn/reposition q2h for pressure re-distribution on skin.   6-B/L lower extremity wounds- Cleanse wounds with NSS, pat dry. Apply single layer of Xeroform over wound beds, cover with ABD pad and wrap with Shreya. Change every other day and as needed for soilage/displacement.

## 2024-12-20 NOTE — PLAN OF CARE
Problem: Prexisting or High Potential for Compromised Skin Integrity  Goal: Skin integrity is maintained or improved  Description: INTERVENTIONS:  - Identify patients at risk for skin breakdown  - Assess and monitor skin integrity  - Assess and monitor nutrition and hydration status  - Monitor labs   - Assess for incontinence   - Turn and reposition patient  - Assist with mobility/ambulation  - Relieve pressure over bony prominences  - Avoid friction and shearing  - Provide appropriate hygiene as needed including keeping skin clean and dry  - Evaluate need for skin moisturizer/barrier cream  - Collaborate with interdisciplinary team   - Patient/family teaching  - Consider wound care consult   Outcome: Progressing     Problem: PAIN - ADULT  Goal: Verbalizes/displays adequate comfort level or baseline comfort level  Description: Interventions:  - Encourage patient to monitor pain and request assistance  - Assess pain using appropriate pain scale  - Administer analgesics based on type and severity of pain and evaluate response  - Implement non-pharmacological measures as appropriate and evaluate response  - Consider cultural and social influences on pain and pain management  - Notify physician/advanced practitioner if interventions unsuccessful or patient reports new pain  Outcome: Progressing     Problem: INFECTION - ADULT  Goal: Absence or prevention of progression during hospitalization  Description: INTERVENTIONS:  - Assess and monitor for signs and symptoms of infection  - Monitor lab/diagnostic results  - Monitor all insertion sites, i.e. indwelling lines, tubes, and drains  - Monitor endotracheal if appropriate and nasal secretions for changes in amount and color  - [place] appropriate cooling/warming therapies per order  - Administer medications as ordered  - Instruct and encourage patient and family to use good hand hygiene technique  - Identify and instruct in appropriate isolation precautions for  identified infection/condition  Outcome: Progressing  Goal: Absence of fever/infection during neutropenic period  Description: INTERVENTIONS:  - Monitor WBC    Outcome: Progressing     Problem: SAFETY ADULT  Goal: Patient will remain free of falls  Description: INTERVENTIONS:  - Educate patient/family on patient safety including physical limitations  - Instruct patient to call for assistance with activity   - Consult OT/PT to assist with strengthening/mobility   - Keep Call bell within reach  - Keep bed low and locked with side rails adjusted as appropriate  - Keep care items and personal belongings within reach  - Initiate and maintain comfort rounds  - Make Fall Risk Sign visible to staff  - Offer Toileting every  Hours, in advance of need  - Initiate/Maintain alarm  - Obtain necessary fall risk management equipment:   - Apply yellow socks and bracelet for high fall risk patients  - Consider moving patient to room near nurses station  Outcome: Progressing  Goal: Maintain or return to baseline ADL function  Description: INTERVENTIONS:  -  Assess patient's ability to carry out ADLs; assess patient's baseline for ADL function and identify physical deficits which impact ability to perform ADLs (bathing, care of mouth/teeth, toileting, grooming, dressing, etc.)  - Assess/evaluate cause of self-care deficits   - Assess range of motion  - Assess patient's mobility; develop plan if impaired  - Assess patient's need for assistive devices and provide as appropriate  - Encourage maximum independence but intervene and supervise when necessary  - Involve family in performance of ADLs  - Assess for home care needs following discharge   - Consider OT consult to assist with ADL evaluation and planning for discharge  - Provide patient education as appropriate  Outcome: Progressing  Goal: Maintains/Returns to pre admission functional level  Description: INTERVENTIONS:  - Perform AM-PAC 6 Click Basic Mobility/ Daily Activity  assessment daily.  - Set and communicate daily mobility goal to care team and patient/family/caregiver.   - Collaborate with rehabilitation services on mobility goals if consulted  - Perform Range of Motion  times a day.  - Reposition patient every  hours.  - Dangle patient  times a day  - Stand patient  times a day  - Ambulate patient  times a day  - Out of bed to chair  times a day   - Out of bed for meal times a day  - Out of bed for toileting  - Record patient progress and toleration of activity level   Outcome: Progressing     Problem: DISCHARGE PLANNING  Goal: Discharge to home or other facility with appropriate resources  Description: INTERVENTIONS:  - Identify barriers to discharge w/patient and caregiver  - Arrange for needed discharge resources and transportation as appropriate  - Identify discharge learning needs (meds, wound care, etc.)  - Arrange for interpretive services to assist at discharge as needed  - Refer to Case Management Department for coordinating discharge planning if the patient needs post-hospital services based on physician/advanced practitioner order or complex needs related to functional status, cognitive ability, or social support system  Outcome: Progressing     Problem: Knowledge Deficit  Goal: Patient/family/caregiver demonstrates understanding of disease process, treatment plan, medications, and discharge instructions  Description: Complete learning assessment and assess knowledge base.  Interventions:  - Provide teaching at level of understanding  - Provide teaching via preferred learning methods  Outcome: Progressing     Problem: Nutrition/Hydration-ADULT  Goal: Nutrient/Hydration intake appropriate for improving, restoring or maintaining nutritional needs  Description: Monitor and assess patient's nutrition/hydration status for malnutrition. Collaborate with interdisciplinary team and initiate plan and interventions as ordered.  Monitor patient's weight and dietary intake as  ordered or per policy. Utilize nutrition screening tool and intervene as necessary. Determine patient's food preferences and provide high-protein, high-caloric foods as appropriate.     INTERVENTIONS:  - Monitor oral intake, urinary output, labs, and treatment plans  - Assess nutrition and hydration status and recommend course of action  - Evaluate amount of meals eaten  - Assist patient with eating if necessary   - Allow adequate time for meals  - Recommend/ encourage appropriate diets, oral nutritional supplements, and vitamin/mineral supplements  - Order, calculate, and assess calorie counts as needed  - Recommend, monitor, and adjust tube feedings and TPN/PPN based on assessed needs  - Assess need for intravenous fluids  - Provide specific nutrition/hydration education as appropriate  - Include patient/family/caregiver in decisions related to nutrition  Outcome: Progressing     Problem: NEUROSENSORY - ADULT  Goal: Achieves stable or improved neurological status  Description: INTERVENTIONS  - Monitor and report changes in neurological status  - Monitor vital signs such as temperature, blood pressure, glucose, and any other labs ordered   - Initiate measures to prevent increased intracranial pressure  - Monitor for seizure activity and implement precautions if appropriate      Outcome: Progressing  Goal: Remains free of injury related to seizures activity  Description: INTERVENTIONS  - Maintain airway, patient safety  and administer oxygen as ordered  - Monitor patient for seizure activity, document and report duration and description of seizure to physician/advanced practitioner  - If seizure occurs,  ensure patient safety during seizure  - Reorient patient post seizure  - Seizure pads on all 4 side rails  - Instruct patient/family to notify RN of any seizure activity including if an aura is experienced  - Instruct patient/family to call for assistance with activity based on nursing assessment  - Administer  anti-seizure medications if ordered    Outcome: Progressing  Goal: Achieves maximal functionality and self care  Description: INTERVENTIONS  - Monitor swallowing and airway patency with patient fatigue and changes in neurological status  - Encourage and assist patient to increase activity and self care.   - Encourage visually impaired, hearing impaired and aphasic patients to use assistive/communication devices  Outcome: Progressing     Problem: CARDIOVASCULAR - ADULT  Goal: Maintains optimal cardiac output and hemodynamic stability  Description: INTERVENTIONS:  - Monitor I/O, vital signs and rhythm  - Monitor for S/S and trends of decreased cardiac output  - Administer and titrate ordered vasoactive medications to optimize hemodynamic stability  - Assess quality of pulses, skin color and temperature  - Assess for signs of decreased coronary artery perfusion  - Instruct patient to report change in severity of symptoms  Outcome: Progressing  Goal: Absence of cardiac dysrhythmias or at baseline rhythm  Description: INTERVENTIONS:  - Continuous cardiac monitoring, vital signs, obtain 12 lead EKG if ordered  - Administer antiarrhythmic and heart rate control medications as ordered  - Monitor electrolytes and administer replacement therapy as ordered  Outcome: Progressing     Problem: RESPIRATORY - ADULT  Goal: Achieves optimal ventilation and oxygenation  Description: INTERVENTIONS:  - Assess for changes in respiratory status  - Assess for changes in mentation and behavior  - Position to facilitate oxygenation and minimize respiratory effort  - Oxygen administered by appropriate delivery if ordered  - Initiate smoking cessation education as indicated  - Encourage broncho-pulmonary hygiene including cough, deep breathe, Incentive Spirometry  - Assess the need for suctioning and aspirate as needed  - Assess and instruct to report SOB or any respiratory difficulty  - Respiratory Therapy support as indicated  Outcome:  Progressing     Problem: METABOLIC, FLUID AND ELECTROLYTES - ADULT  Goal: Electrolytes maintained within normal limits  Description: INTERVENTIONS:  - Monitor labs and assess patient for signs and symptoms of electrolyte imbalances  - Administer electrolyte replacement as ordered  - Monitor response to electrolyte replacements, including repeat lab results as appropriate  - Instruct patient on fluid and nutrition as appropriate  Outcome: Progressing  Goal: Fluid balance maintained  Description: INTERVENTIONS:  - Monitor labs   - Monitor I/O and WT  - Instruct patient on fluid and nutrition as appropriate  - Assess for signs & symptoms of volume excess or deficit  Outcome: Progressing

## 2024-12-20 NOTE — WOUND OSTOMY CARE
Consult Note - Wound   Tom Stewart 89 y.o. male MRN: [id number]  Unit/Bed#: 2 E 271-01 Encounter: [id number]        Assessment :   Patient admitted to [facility] due to acute on chronic heart failure with preserved EF. History of a-fib., CAD, BPH, CKD, Dementia, diabetes, CVA, HLD, HTN. Wound care nurse consulted for bilateral lower extremity wounds. Patient is agreeable to assessment, alert and oriented x3, continent of bowel, incontinent of bladder, external male urinary device in place for urine management, assist of 1 to turn for assessment, is an assist with care.    1. Bilateral sacrum, buttock, hips, elbows, and left heel- Skin is dry, intact, blanchable.     2. Right distal medial tibia- Wounds are scattered, irregular in shape, partial thickness, 100% mixture of dry pink and yellow tissue, no drainage noted. Maria Elena-wounds are dry, intact, pink skin.     3. Right heel- Wounds are scattered, oval in shape, true depth unknown, 100% covered in dry brown and yellow devitalized tissue, no drainage noted. Maria Elena-wounds are dry, intact, pink skin.     4. Left lateral proximal tibia- Wound is oval in shape, partial thickness, approx. 50% yellow tissue and 50% pink tissue, with no drainage noted. Maria Elena-wound is dry, intact, pink skin.     5. Left medial distal tibia- Wounds are scattered, irregular in shape, partial thickness, 100% mixture of dry pink and yellow tissue, no drainage noted. Maria Elena-wounds are dry, intact, pink skin.     Educated patient on importance of frequent offloading of pressure via turning, repositioning, and weight-shifting. Verbalized understanding of plan of care.    No induration, fluctuance, odor, warmth, redness, or purulence noted to the above noted wounds. New dressings applied. Patient tolerated well, reports mild pain to the wounds. Primary nurse aware of plan of care. See flow sheets for more detailed assessment findings. Will follow along.      Skin care plans:  1-Hydraguard/Silicone Cream to  bilateral sacrum, buttock, and heels BID and PRN  2-Elevate heels to offload pressure.  3-Ehob cushion in chair when out of bed.  4-Moisturize skin daily with skin nourishing cream.  5-Turn/reposition q2h for pressure re-distribution on skin.   6-B/L lower extremity wounds- Cleanse wounds with NSS, pat dry. Apply single layer of Xeroform over wound beds, cover with ABD pad and wrap with Shreya. Change every other day and as needed for soilage/displacement.       Wound 12/10/24 Tibial Right;Medial (Active)   Wound Image   12/20/24 0853   Wound Description Dry;Pink;Yellow 12/20/24 0853   Maria Elena-wound Assessment Dry;Intact;Pink 12/20/24 0853   Wound Length (cm) 1.5 cm 12/20/24 0853   Wound Width (cm) 5 cm 12/20/24 0853   Wound Depth (cm) 0.1 cm 12/20/24 0853   Wound Surface Area (cm^2) 7.5 cm^2 12/20/24 0853   Wound Volume (cm^3) 0.75 cm^3 12/20/24 0853   Calculated Wound Volume (cm^3) 0.75 cm^3 12/20/24 0853   Change in Wound Size % 25 12/20/24 0853   Drainage Amount None 12/20/24 0853   Non-staged Wound Description Partial thickness 12/20/24 0853   Treatments Cleansed;Irrigation with NSS;Site care 12/20/24 0853   Dressing Xeroform;ABD;Dry dressing 12/20/24 0853   Wound packed? No 12/20/24 0853   Dressing Changed Changed 12/20/24 0853   Patient Tolerance Tolerated well 12/20/24 0853   Dressing Status Clean;Dry;Intact 12/20/24 0853       Wound 12/10/24 Tibial Left;Medial (Active)   Wound Image   12/20/24 0853   Wound Description Dry;Pink;Yellow 12/20/24 0853   Maria Elena-wound Assessment Dry;Intact;Pink 12/20/24 0853   Wound Length (cm) 10.5 cm 12/20/24 0853   Wound Width (cm) 7 cm 12/20/24 0853   Wound Depth (cm) 0.1 cm 12/20/24 0853   Wound Surface Area (cm^2) 73.5 cm^2 12/20/24 0853   Wound Volume (cm^3) 7.35 cm^3 12/20/24 0853   Calculated Wound Volume (cm^3) 7.35 cm^3 12/20/24 0853   Change in Wound Size % 12.5 12/20/24 0853   Drainage Amount None 12/20/24 0853   Non-staged Wound Description Partial thickness 12/20/24 0853    Treatments Cleansed;Irrigation with NSS;Site care 12/20/24 0853   Dressing Xeroform;ABD;Dry dressing 12/20/24 0853   Wound packed? No 12/20/24 0853   Dressing Changed Changed 12/20/24 0853   Patient Tolerance Tolerated well 12/20/24 0853   Dressing Status Clean;Dry;Intact 12/20/24 0853       Wound 12/10/24 Ankle Right;Medial;Posterior (Active)   Wound Image   12/20/24 0854   Wound Description Dry;Brown;Yellow 12/20/24 0854   Maria Elena-wound Assessment Dry;Intact;Pink 12/20/24 0854   Wound Length (cm) 3 cm 12/20/24 0854   Wound Width (cm) 4.5 cm 12/20/24 0854   Wound Depth (cm) 0 cm 12/20/24 0854   Wound Surface Area (cm^2) 13.5 cm^2 12/20/24 0854   Wound Volume (cm^3) 0 cm^3 12/20/24 0854   Calculated Wound Volume (cm^3) 0 cm^3 12/20/24 0854   Change in Wound Size % 100 12/20/24 0854   Drainage Amount None 12/20/24 0854   Non-staged Wound Description Not applicable 12/20/24 0854   Treatments Cleansed;Irrigation with NSS;Site care 12/20/24 0854   Dressing Xeroform;ABD;Dry dressing 12/20/24 0854   Wound packed? No 12/20/24 0854   Dressing Changed Changed 12/20/24 0854   Patient Tolerance Tolerated well 12/20/24 0854   Dressing Status Clean;Dry;Intact 12/20/24 0854       Wound 12/10/24 Tibial Left;Lateral (Active)   Wound Image   12/20/24 0852   Wound Description Pink;Yellow 12/20/24 0852   Maria Elena-wound Assessment Dry;Intact;Pink 12/20/24 0852   Wound Length (cm) 1.5 cm 12/20/24 0852   Wound Width (cm) 0.8 cm 12/20/24 0852   Wound Depth (cm) 0.1 cm 12/20/24 0852   Wound Surface Area (cm^2) 1.2 cm^2 12/20/24 0852   Wound Volume (cm^3) 0.12 cm^3 12/20/24 0852   Calculated Wound Volume (cm^3) 0.12 cm^3 12/20/24 0852   Change in Wound Size % 95.6 12/20/24 0852   Drainage Amount None 12/20/24 0852   Non-staged Wound Description Partial thickness 12/20/24 0852   Treatments Cleansed;Irrigation with NSS;Site care 12/20/24 0852   Dressing Xeroform;ABD;Dry dressing 12/20/24 0852   Wound packed? No 12/20/24 0852   Dressing Changed  Changed 12/20/24 0852   Patient Tolerance Tolerated well 12/20/24 0852   Dressing Status Clean;Dry;Intact 12/20/24 0852     Contact through [employer] Secure Chat with any questions  Wound Care will continue to follow while inpatient    Lisa SHAWN RN [facility]  Wound and Ostomy care

## 2024-12-20 NOTE — ASSESSMENT & PLAN NOTE
Upon epic chart review, previously known baseline creatinine was around 1.9-2.1.  Patient was previously followed by myself but was last seen in nephrology office in September 2023 and seems to have lost follow-up since that time.     Patient was admitted with volume overload and echocardiogram done on 12/8/2024 also showed EF of 35%.  Patient was started on Bumex 4 mg IV twice daily.  In the last 24 hours patient has voided 1.5 L which is not adequate compared to the dose of diuretics patient has received.  Serial bladder scan showed no urinary retention.  Will plan to add Diuril 500 mg IV x 1 dose to help boost urine output on top of current dose of IV Bumex.    Overnight renal function has remained above the baseline with current creatinine of 2.35 with EGFR of 23.  Recommend rechecking renal function with a.m. lab.

## 2024-12-20 NOTE — PROGRESS NOTES
Progress Note - Hospitalist   Name: Tom Stewart 89 y.o. male I MRN: [id number]  Unit/Bed#: 2 E 271-01 I Date of Admission: 12/18/2024   Date of Service: 12/20/2024 I Hospital Day: 1    Assessment & Plan  Acute on chronic heart failure with preserved ejection fraction (HCC)  Wt Readings from Last 3 Encounters:   12/20/24 100 kg (220 lb 10.9 oz)   12/11/24 96.1 kg (211 lb 13.8 oz)   12/02/24 96.6 kg (213 lb)     82-year-old male with history of CHF recently discharged approximately 1 week ago after acute CHF exacerbation reports worsening shortness of breath and peripheral edema  At home on Bumex twice daily  Recent echo revealed EF of 35% on December 8th  Cardiology appreciated, was started on IV Lasix upon admission but urine output not good enough hence cardiology changed to Bumex 4 mg IV,  Per nephrology added Diuril 500 mg x 1 dose today        Paroxysmal atrial fibrillation (HCC)  Currently rate controlled  Continue beta-blocker  Continue Eliquis  Type 2 diabetes mellitus with stage 4 chronic kidney disease, without long-term current use of insulin ([facility])  Hold p.o. glipizide  Ssi  Monitor BG  Stage 4 chronic kidney disease ([facility])  Baseline creatinine approximately 1.8-2.2  Presented with a creatinine of 2.4  Nephrology on board, discussed with family members if creatinine continues to get worse on aggressive IV diuresis instead of hemodialysis but will possibly choose hospice/palliative route  Primary hypertension  BP currently controlled  Continue nifedipine, lisinopril, metoprolol  Other Alzheimer's disease (HCC)  Currently at baseline mental status  Delirium precautions  Moderate aortic stenosis    Ulcer of left lower extremity, limited to breakdown of skin (HCC)  Patient with bilateral lower extremity chronic wounds  Lower extremity Doppler to rule out DVT  Wound care consult  Nutrition consult  Discussed with RN, change dressing, upload picture in media, heel offload    VTE Pharmacologic Prophylaxis: VTE  Score: 7 Moderate Risk (Score 3-4) - Pharmacological DVT Prophylaxis Ordered: apixaban (Eliquis).    Mobility:   Basic Mobility Inpatient Raw Score: 8  JH-HLM Goal: 3: Sit at edge of bed  JH-HLM Achieved: 2: Bed activities/Dependent transfer  JH-HLM Goal NOT achieved. Continue with multidisciplinary rounding and encourage appropriate mobility to improve upon JH-HLM goals.    Patient Centered Rounds: I performed bedside rounds with nursing staff today.   Discussions with Specialists or Other Care Team Provider:     Education and Discussions with Family / Patient:   .     Current Length of Stay: 1 day(s)  Current Patient Status: Inpatient   Certification Statement: The patient will continue to require additional inpatient hospital stay due to diuretics  Discharge Plan: Anticipate discharge in 48-72 hrs to discharge location to be determined pending rehab evaluations.    Code Status: Level 3 - DNAR and DNI    Subjective   Resting in bed, does not feel better, nurse at bedside trying to help patient get on a recliner.  Poor appetite.  Breathing seems slightly better than yesterday when I saw him in the ER    Objective :  Temp:  [97.7 °F (36.5 °C)-97.9 °F (36.6 °C)] 97.7 °F (36.5 °C)  HR:  [] 98  BP: (123-163)/(63-86) 139/73  Resp:  [18-36] 18  SpO2:  [92 %-98 %] 92 %  O2 Device: Nasal cannula  Nasal Cannula O2 Flow Rate (L/min):  [4 L/min] 4 L/min    Body mass index is 31.66 kg/m².     Input and Output Summary (last 24 hours):     Intake/Output Summary (Last 24 hours) at 12/20/2024 1426  Last data filed at 12/20/2024 0501  Gross per 24 hour   Intake 370 ml   Output 1275 ml   Net -905 ml       Physical Exam  Constitutional:       Appearance: He is ill-appearing.   HENT:      Mouth/Throat:      Mouth: Mucous membranes are moist.   Eyes:      Pupils: Pupils are equal, round, and reactive to light.   Cardiovascular:      Rate and Rhythm: Tachycardia present.      Heart sounds: Murmur heard.   Pulmonary:      Breath  sounds: Rales present.   Abdominal:      General: Bowel sounds are normal.      Palpations: Abdomen is soft.   Musculoskeletal:         General: Normal range of motion.      Cervical back: Normal range of motion.      Right lower leg: Edema present.      Left lower leg: Edema present.   Skin:     Findings: Lesion present.   Neurological:      General: No focal deficit present.      Mental Status: He is alert. Mental status is at baseline.           Lines/Drains:  Lines/Drains/Airways       Active Status       Name Placement date Placement time Site Days    External Urinary Catheter 12/20/24  0000  -- less than 1                      Telemetry:  Telemetry Orders (From admission, onward)               24 Hour Telemetry Monitoring  Continuous x 24 Hours (Telem)        Expiring   Question:  Reason for 24 Hour Telemetry  Answer:  Decompensated CHF- and any one of the following: continuous diuretic infusion or total diuretic dose >200 mg daily, associated electrolyte derangement (I.e. K < 3.0), inotropic drip (continuous infusion), hx of ventricular arrhythmia, or new EF < 35%                     Telemetry Reviewed: Normal Sinus Rhythm  Indication for Continued Telemetry Use: Acute CHF on >200 mg lasix/day or equivalent dose or with new reduced EF.                Lab Results: I have reviewed the following results:   Results from last 7 days   Lab Units 12/20/24  0517   WBC Thousand/uL 6.96   HEMOGLOBIN g/dL 9.6*   HEMATOCRIT % 31.2*   PLATELETS Thousands/uL 142*   SEGS PCT % 69   LYMPHO PCT % 21   MONO PCT % 7   EOS PCT % 2     Results from last 7 days   Lab Units 12/20/24  0517 12/19/24  0508 12/18/24  1749   SODIUM mmol/L 134*   < > 131*   POTASSIUM mmol/L 4.1   < > 4.6   CHLORIDE mmol/L 94*   < > 92*   CO2 mmol/L 34*   < > 32   BUN mg/dL 58*   < > 59*   CREATININE mg/dL 2.35*   < > 2.47*   ANION GAP mmol/L 6   < > 7   CALCIUM mg/dL 8.9   < > 8.8   ALBUMIN g/dL  --   --  3.5   TOTAL BILIRUBIN mg/dL  --   --  0.67   ALK  PHOS U/L  --   --  58   ALT U/L  --   --  16   AST U/L  --   --  14   GLUCOSE RANDOM mg/dL 184*   < > 333*    < > = values in this interval not displayed.         Results from last 7 days   Lab Units 12/20/24  1140 12/20/24  0804 12/19/24  2100 12/19/24  1604 12/19/24  1123 12/19/24  0653 12/18/24  2137   POC GLUCOSE mg/dl 227* 169* 209* 143* 180* 154* 220*               Recent Cultures (last 7 days):         Imaging Results Review: I reviewed radiology reports from this admission including: chest xray.  Other Study Results Review: No additional pertinent studies reviewed.    Last 24 Hours Medication List:     Current Facility-Administered Medications:     acetaminophen (TYLENOL) tablet 650 mg, Q4H PRN    ALPRAZolam (XANAX) tablet 0.25 mg, HS PRN    apixaban (ELIQUIS) tablet 2.5 mg, BID    aspirin (ECOTRIN LOW STRENGTH) EC tablet 81 mg, Daily    bumetanide (BUMEX) injection 4 mg, BID    docusate sodium (COLACE) capsule 100 mg, BID    finasteride (PROSCAR) tablet 5 mg, Daily    gabapentin (NEURONTIN) capsule 100 mg, BID    insulin lispro (HumALOG/ADMELOG) 100 units/mL subcutaneous injection 1-5 Units, TID AC **AND** Fingerstick Glucose (POCT), TID AC    insulin lispro (HumALOG/ADMELOG) 100 units/mL subcutaneous injection 1-5 Units, HS    lisinopril (ZESTRIL) tablet 20 mg, Daily    metoprolol succinate (TOPROL-XL) 24 hr tablet 50 mg, Daily    NIFEdipine (PROCARDIA XL) 24 hr tablet 60 mg, Daily    senna (SENOKOT) tablet 8.6 mg, Daily PRN    Administrative Statements   Today, Patient Was Seen By: Angie Jiang MD  I have spent a total time of 25 minutes in caring for this patient on the day of the visit/encounter including Reviewing / ordering tests, medicine, procedures  .    **Please Note: This note may have been constructed using a voice recognition system.**

## 2024-12-20 NOTE — PROGRESS NOTES
NEPHROLOGY PROGRESS NOTE    Patient: Tom Stewart               Sex: male          DOA: 12/18/2024  5:26 PM   YOB: 1935        Age: 89 y.o.         LOS:  LOS: 1 day   Encounter Date: 12/20/2024    REASON FOR THE CONSULTATION: Further management of chronic kidney disease.    ASSESSMENT / PLAN     Assessment & Plan  Stage 4 chronic kidney disease (HCC)    Upon epic chart review, previously known baseline creatinine was around 1.9-2.1.  Patient was previously followed by myself but was last seen in nephrology office in September 2023 and seems to have lost follow-up since that time.     Patient was admitted with volume overload and echocardiogram done on 12/8/2024 also showed EF of 35%.  Patient was started on Bumex 4 mg IV twice daily.  In the last 24 hours patient has voided 1.5 L which is not adequate compared to the dose of diuretics patient has received.  Serial bladder scan showed no urinary retention.  Will plan to add Diuril 500 mg IV x 1 dose to help boost urine output on top of current dose of IV Bumex.    Overnight renal function has remained above the baseline with current creatinine of 2.35 with EGFR of 23.  Recommend rechecking renal function with a.m. lab.    Primary hypertension    Blood pressures currently acceptable and plan to monitor hypertension with metoprolol XL 50 mg p.o. daily, nifedipine XL 60 mg p.o. daily and lisinopril 20 mg p.o. daily today.        Overall above mentioned plan and recommendations were also d/w current [facility] physician and also cardiology team and they agreed with my plan use of diuretics as mentioned earlier    Afshan Sandoval MD  Nephrology  12/20/2024    HPI     This is a 89 y.o. male admitted for Acute on chronic heart failure with preserved ejection fraction (HCC)     SUBJECTIVE     -Voided 1.4 L of urine.  External Fox catheter in place.  Patient denies nausea, vomiting, headache or dizziness today.  - Reviewed last 24 hrs events     CURRENT  MEDICATIONS       Current Facility-Administered Medications:     acetaminophen (TYLENOL) tablet 650 mg, 650 mg, Oral, Q4H PRN, Eliud Day MD, 650 mg at 12/20/24 0601    ALPRAZolam (XANAX) tablet 0.25 mg, 0.25 mg, Oral, HS PRN, Eliud Day MD, 0.25 mg at 12/19/24 2353    apixaban (ELIQUIS) tablet 2.5 mg, 2.5 mg, Oral, BID, Eliud Day MD, 2.5 mg at 12/20/24 0932    aspirin (ECOTRIN LOW STRENGTH) EC tablet 81 mg, 81 mg, Oral, Daily, Eliud Day MD, 81 mg at 12/20/24 0932    bumetanide (BUMEX) injection 4 mg, 4 mg, Intravenous, BID, Natacha Garibay PA-C, 4 mg at 12/20/24 0932    chlorothiazide (DIURIL) 500 mg in sodium chloride 0.9 % 100 mL IV, 500 mg, Intravenous, Once, Afshan Sandoval MD    docusate sodium (COLACE) capsule 100 mg, 100 mg, Oral, BID, Natacha Garibay PA-C, 100 mg at 12/20/24 0932    finasteride (PROSCAR) tablet 5 mg, 5 mg, Oral, Daily, Eliud Day MD, 5 mg at 12/20/24 0932    gabapentin (NEURONTIN) capsule 100 mg, 100 mg, Oral, BID, Eliud Day MD, 100 mg at 12/20/24 0932    insulin lispro (HumALOG/ADMELOG) 100 units/mL subcutaneous injection 1-5 Units, 1-5 Units, Subcutaneous, TID AC, 1 Units at 12/20/24 0805 **AND** Fingerstick Glucose (POCT), , , TID AC, Eliud Day MD    insulin lispro (HumALOG/ADMELOG) 100 units/mL subcutaneous injection 1-5 Units, 1-5 Units, Subcutaneous, HS, Eliud Day MD, 1 Units at 12/19/24 2101    lisinopril (ZESTRIL) tablet 20 mg, 20 mg, Oral, Daily, Eliud Day MD, 20 mg at 12/20/24 0932    metoprolol succinate (TOPROL-XL) 24 hr tablet 50 mg, 50 mg, Oral, Daily, Eliud Day MD, 50 mg at 12/20/24 0932    NIFEdipine (PROCARDIA XL) 24 hr tablet 60 mg, 60 mg, Oral, Daily, Eliud Day MD, 60 mg at 12/20/24 0932    senna (SENOKOT) tablet 8.6 mg, 1 tablet, Oral, Daily PRN, Natacha Garibay PA-C, 8.6 mg at 12/20/24 0601    OBJECTIVE     Current Weight: Weight - Scale: 100 kg (220 lb 10.9 oz)  /73 (BP Location: Left arm)   Pulse 98   Temp 97.7 °F  "(36.5 °C)   Resp 18   Ht 5' 10\" (1.778 m)   Wt 100 kg (220 lb 10.9 oz)   SpO2 93%   BMI 31.66 kg/m²   Vitals:    12/20/24 1100   BP: 139/73   Pulse: 98   Resp: 18   Temp:    SpO2:      Body mass index is 31.66 kg/m².    Intake/Output Summary (Last 24 hours) at 12/20/2024 1145  Last data filed at 12/20/2024 0501  Gross per 24 hour   Intake 470 ml   Output 1275 ml   Net -805 ml       PHYSICAL EXAMINATION     Physical Exam  Constitutional:       General: He is not in acute distress.     Appearance: He is ill-appearing.   HENT:      Right Ear: External ear normal.   Eyes:      Conjunctiva/sclera:      Right eye: No hemorrhage.  Neck:      Thyroid: No thyromegaly.   Pulmonary:      Effort: No accessory muscle usage or respiratory distress.   Abdominal:      General: There is no distension.   Musculoskeletal:         General: Swelling present.   Skin:     Coloration: Skin is not jaundiced.   Psychiatric:         Behavior: Behavior is not combative.           LAB RESULTS     Results from last 7 days   Lab Units 12/20/24  0517 12/19/24  1736 12/19/24  0508 12/18/24  1749   WBC Thousand/uL 6.96  --  7.70 6.70   HEMOGLOBIN g/dL 9.6*  --  9.7* 9.6*   HEMATOCRIT % 31.2*  --  32.3* 31.0*   PLATELETS Thousands/uL 142*  --  143* 147*   SODIUM mmol/L 134* 134* 133* 131*   POTASSIUM mmol/L 4.1 4.1 4.0 4.6   CHLORIDE mmol/L 94* 93* 94* 92*   CO2 mmol/L 34* 33* 33* 32   BUN mg/dL 58* 54* 55* 59*   CREATININE mg/dL 2.35* 2.11* 2.40* 2.47*   EGFR ml/min/1.73sq m 23 27 23 22   CALCIUM mg/dL 8.9 9.1 8.9 8.8   MAGNESIUM mg/dL  --   --  2.2  --        RADIOLOGY RESULTS       VAS VENOUS DUPLEX - LOWER LIMB BILATERAL   Final Result by Arabella Kenny MD (12/19 1512)      XR chest 2 views   ED Interpretation by Mauricio Lozoya DO (12/18 1808)   Diffuse pulmonary vascular congestion as interpreted by me        Final Result by Ifeanyi Castaneda MD (12/19 6733)      Mild pulmonary vascular congestion.      Clinical provider " "is aware of the findings.            Workstation performed: [id number]             Portions of the record may have been created with voice recognition software. Occasional wrong word or \"sound a like\" substitutions may have occurred due to the inherent limitations of voice recognition software. Read the chart carefully and recognize, using context, where substitutions have occurred.  "

## 2024-12-20 NOTE — PROGRESS NOTES
Progress Note - Cardiology   Name: Tom Stewart 89 y.o. male I MRN: [id number]  Unit/Bed#: 2 E 271-01 I Date of Admission: 12/18/2024   Date of Service: 12/20/2024 I Hospital Day: 1    Assessment & Plan  Acute on chronic heart failure with preserved ejection fraction (HCC)  Wt Readings from Last 3 Encounters:   12/20/24 100 kg (220 lb 10.9 oz)   12/11/24 96.1 kg (211 lb 13.8 oz)   12/02/24 96.6 kg (213 lb)   Remains hypervolemic, continue Bumex 4 mg IV twice daily  Continue Eliquis, aspirin, lisinopril, Toprol, nifedipine  Daily weights, salt restriction  Strict I's and O's  Patient with urinary catheter in place  Last echo 12/8/2024 EF 35%, moderate global hypokinesis with regional variation, ungraded diastolic dysfunction, moderate to severe AS, mild to moderate MR, mild TR, mild to moderate pulmonary hypertension with RVSP 46mmhg  Paroxysmal atrial fibrillation (HCC)  Rate controlled 60s to 80s  Continue Eliquis 2.5 twice daily, Toprol 50  Monitor telemetry  Primary hypertension  Controlled   Continue with Toprol, lisinopril, Bumex  Moderate aortic stenosis  Moderate to severe per echo from 11 days ago  Type 2 diabetes mellitus with stage 4 chronic kidney disease, without long-term current use of insulin (HCC)  Lab Results   Component Value Date    HGBA1C 8.5 (H) 12/06/2024   Mgmt per [facility]    Stage 4 chronic kidney disease (HCC)  Lab Results   Component Value Date    EGFR 23 12/20/2024    EGFR 27 12/19/2024    EGFR 23 12/19/2024    CREATININE 2.35 (H) 12/20/2024    CREATININE 2.11 (H) 12/19/2024    CREATININE 2.40 (H) 12/19/2024     Baseline 1.8-2.2  Continue to monitor closely  Other Alzheimer's disease (HCC)  Management per [facility]  Ulcer of left lower extremity, limited to breakdown of skin (HCC)  Management per [facility]     Subjective   Chief Complaint: not obtainable       Objective :  Temp:  [97.7 °F (36.5 °C)-98.7 °F (37.1 °C)] 97.7 °F (36.5 °C)  HR:  [] 67  BP: (123-163)/(63-93) 125/63  Resp:  [18-36]  18  SpO2:  [92 %-98 %] 93 %  O2 Device: Nasal cannula  Nasal Cannula O2 Flow Rate (L/min):  [4 L/min] 4 L/min  Orthostatic Blood Pressures      Flowsheet Row Most Recent Value   Blood Pressure 125/63 filed at 12/20/2024 0932   Patient Position - Orthostatic VS Lying filed at 12/20/2024 0932          First Weight: Weight - Scale: 103 kg (227 lb 1.2 oz) (12/18/24 1734)  Vitals:    12/19/24 1414 12/20/24 0601   Weight: 93 kg (205 lb 0.4 oz) 100 kg (220 lb 10.9 oz)     Physical Exam  Vitals and nursing note reviewed.   Constitutional:       General: He is not in acute distress.     Appearance: He is well-developed.   HENT:      Head: Normocephalic and atraumatic.   Eyes:      Conjunctiva/sclera: Conjunctivae normal.   Cardiovascular:      Rate and Rhythm: Normal rate and regular rhythm.      Heart sounds: No murmur heard.  Pulmonary:      Effort: Pulmonary effort is normal. No respiratory distress.      Breath sounds: Normal breath sounds.   Abdominal:      Palpations: Abdomen is soft.      Tenderness: There is no abdominal tenderness.   Musculoskeletal:         General: No swelling.      Cervical back: Neck supple.      Right lower leg: Edema present.      Left lower leg: Edema present.   Skin:     General: Skin is warm and dry.   Neurological:      Mental Status: He is alert.   Psychiatric:         Mood and Affect: Mood normal.           Lab Results: I have reviewed the following results:  Results from last 7 days   Lab Units 12/20/24  0517 12/19/24  0508 12/18/24  1749   WBC Thousand/uL 6.96 7.70 6.70   HEMOGLOBIN g/dL 9.6* 9.7* 9.6*   HEMATOCRIT % 31.2* 32.3* 31.0*   PLATELETS Thousands/uL 142* 143* 147*     Results from last 7 days   Lab Units 12/20/24  0517 12/19/24  1736 12/19/24  0508   POTASSIUM mmol/L 4.1 4.1 4.0   CHLORIDE mmol/L 94* 93* 94*   CO2 mmol/L 34* 33* 33*   BUN mg/dL 58* 54* 55*   CREATININE mg/dL 2.35* 2.11* 2.40*   CALCIUM mg/dL 8.9 9.1 8.9         Lab Results   Component Value Date    HGBA1C 8.5  (H) 12/06/2024     Lab Results   Component Value Date    TROPONINI 0.02 10/14/2021

## 2024-12-20 NOTE — ASSESSMENT & PLAN NOTE
Wt Readings from Last 3 Encounters:   12/20/24 100 kg (220 lb 10.9 oz)   12/11/24 96.1 kg (211 lb 13.8 oz)   12/02/24 96.6 kg (213 lb)     82-year-old male with history of CHF recently discharged approximately 1 week ago after acute CHF exacerbation reports worsening shortness of breath and peripheral edema  At home on Bumex twice daily  Recent echo revealed EF of 35% on December 8th  Cardiology appreciated, was started on IV Lasix upon admission but urine output not good enough hence cardiology changed to Bumex 4 mg IV,  Per nephrology added Diuril 500 mg x 1 dose today

## 2024-12-20 NOTE — ASSESSMENT & PLAN NOTE
Wt Readings from Last 3 Encounters:   12/20/24 100 kg (220 lb 10.9 oz)   12/11/24 96.1 kg (211 lb 13.8 oz)   12/02/24 96.6 kg (213 lb)   Remains hypervolemic, continue Bumex 4 mg IV twice daily  Continue Eliquis, aspirin, lisinopril, Toprol, nifedipine  Daily weights, salt restriction  Strict I's and O's  Patient with urinary catheter in place  Last echo 12/8/2024 EF 35%, moderate global hypokinesis with regional variation, ungraded diastolic dysfunction, moderate to severe AS, mild to moderate MR, mild TR, mild to moderate pulmonary hypertension with RVSP 46mmhg

## 2024-12-20 NOTE — ASSESSMENT & PLAN NOTE
Lab Results   Component Value Date    EGFR 23 12/20/2024    EGFR 27 12/19/2024    EGFR 23 12/19/2024    CREATININE 2.35 (H) 12/20/2024    CREATININE 2.11 (H) 12/19/2024    CREATININE 2.40 (H) 12/19/2024     Baseline 1.8-2.2  Continue to monitor closely

## 2024-12-20 NOTE — ASSESSMENT & PLAN NOTE
Blood pressures currently acceptable and plan to monitor hypertension with metoprolol XL 50 mg p.o. daily, nifedipine XL 60 mg p.o. daily and lisinopril 20 mg p.o. daily today.

## 2024-12-20 NOTE — RESPIRATORY THERAPY NOTE
RT Protocol Note  Tom Stewart 89 y.o. male MRN: [id number]  Unit/Bed#: 2 E 271-01 Encounter: [id number]    Assessment    Principal Problem:    Acute on chronic heart failure with preserved ejection fraction (HCC)  Active Problems:    Paroxysmal atrial fibrillation (HCC)    Type 2 diabetes mellitus with stage 4 chronic kidney disease, without long-term current use of insulin (HCC)    Stage 4 chronic kidney disease (HCC)    Primary hypertension    Other Alzheimer's disease (HCC)    Moderate aortic stenosis    Ulcer of left lower extremity, limited to breakdown of skin (HCC)      Home Pulmonary Medications:     12/20/24 1522   Respiratory Protocol   Protocol Initiated? Yes   Protocol Selection Respiratory   Language Barrier? No   Medical & Social History Reviewed? Yes   Diagnostic Studies Reviewed? Yes   Physical Assessment Performed? Yes   Respiratory Plan Discontinue Protocol   Respiratory Assessment   Assessment Type Assess only   General Appearance Alert;Awake   Respiratory Pattern Dyspnea with exertion   Chest Assessment Chest expansion symmetrical   Bilateral Breath Sounds Diminished;Crackles   Resp Comments patient admitted for CHF and renal issues, no pulmonary history, no home O2 or pulm meds, no indication for scheduled nebs at this time, will dc protocol   O2 Device nc   Additional Assessments   Pulse 78   SpO2 95 %            Past Medical History:   Diagnosis Date    Acute deep vein thrombosis (DVT) of brachial vein of left upper extremity (HCC) 11/24/2017    Acute deep vein thrombosis (DVT) of left peroneal vein (HCC)     Acute ST elevation myocardial infarction (HCC)     Aortic valve stenosis     Arthritis     Atrial fibrillation (HCC)     Basilar artery stenosis     Basilar artery stenosis     Basilar artery stenosis 05/04/2020    MRA Head wo contrast (12/13/2019)  IMPRESSION:   Multifocal intracranial atherosclerotic disease with moderate to severe stenosis at the origin of the left M1 segment with  additional atherosclerotic disease noted in the distal right M1 and proximal inferior left M2 branches.   Moderate to severe stenosis in the proximal and mid basilar artery with nonvisualization of the right intradural vertebral    Benign prostatic hyperplasia with lower urinary tract symptoms     CAD (coronary artery disease)     CAD in native artery 11/24/2017    Underwent cardiac catheterization on 1/30/2020 and had mid and distal LAD stent placed  Cardiac PCI (1/30/2020)  SUMMARY   CORONARY CIRCULATION: Mid LAD: There was a 90 % stenosis at the site of a prior stent. Distal LAD: There was a 90 % stenosis at the site of a prior stent. Left posterior descending artery: There was a diffuse 50 % stenosis.   1ST LESION INTERVENTIONS: A balloon angioplasty wit    Cerebrovascular small vessel disease 11/12/2020    Chronic kidney disease     Closed fracture of multiple ribs of left side with routine healing 09/03/2020    Coronary artery disease     Dementia (HCC)     Diabetes mellitus (HCC)     DM2 (diabetes mellitus, type 2) ([facility])     Elevated troponin 07/19/2021    Herpes zoster without complication 07/28/2021    History of CVA (cerebrovascular accident) 02/21/2019    History of DVT of peroneal vein left lower extremity 12/16/2019    History of transfusion     HLD (hyperlipidemia)     HTN (hypertension)     Infected sebaceous cyst of skin 06/08/2021    Lump in chest 06/01/2021    Middle cerebral artery stenosis     Mild to moderate aortic stenosis 10/09/2018    ECHO (7/2020)  AORTIC VALVE: Leaflets exhibited moderately increased thickness and moderate calcification. Transaortic velocity was increased due to valvular stenosis. There was mild to moderate stenosis. RICHY 1.4cm, mean pressure gradient 11mmHg, peak velocity 2.15m/s There was mild regurgitation.    Myocardial infarction ([facility])     Near syncope 07/19/2021    Other proteinuria 10/08/2019    Proteinuria     Rib fractures (right) 07/31/2020    Stroke ([facility])      Symptomatic bradycardia     Transient cerebral ischemia     Vitamin D deficiency      Social History     Socioeconomic History    Marital status:      Spouse name: Not on file    Number of children: Not on file    Years of education: Not on file    Highest education level: Not on file   Occupational History    Not on file   Tobacco Use    Smoking status: Never     Passive exposure: Never    Smokeless tobacco: Never   Vaping Use    Vaping status: Never Used   Substance and Sexual Activity    Alcohol use: Never    Drug use: Never    Sexual activity: Not Currently     Partners: Female     Birth control/protection: None   Other Topics Concern    Not on file   Social History Narrative    Active advance directive (as per [employer])     No advance directive on file (as per [employer])      Social Drivers of Health     Financial Resource Strain: Low Risk  (1/23/2024)    Overall Financial Resource Strain (CARDIA)     Difficulty of Paying Living Expenses: Not very hard   Food Insecurity: No Food Insecurity (12/19/2024)    Nursing - Inadequate Food Risk Classification     Worried About Running Out of Food in the Last Year: Not on file     Ran Out of Food in the Last Year: Not on file     Ran Out of Food in the Last Year: 1   Transportation Needs: No Transportation Needs (12/19/2024)    Nursing - Transportation Risk Classification     Lack of Transportation: Not on file     Lack of Transportation: 2   Physical Activity: Inactive (5/11/2021)    Exercise Vital Sign     Days of Exercise per Week: 0 days     Minutes of Exercise per Session: 0 min   Stress: No Stress Concern Present (9/22/2023)    [employer] - Occupational Stress Questionnaire     Feeling of Stress : Not at all   Social Connections: Unknown (6/18/2024)    Received from [employer]    Social Connections     How often do you feel lonely or isolated from those around you? (Adult - for ages 18 years and over): Not on file   Intimate  "Partner Violence: Unknown (2024)    Nursing IPS     Feels Physically and Emotionally Safe: Not on file     Physically Hurt by Someone: Not on file     Humiliated or Emotionally Abused by Someone: Not on file     Physically Hurt by Someone: 2     Hurt or Threatened by Someone: 2   Housing Stability: Unknown (2024)    Nursing: Inadequate Housing Risk Classification     Has Housing: Not on file     Worried About Losing Housing: Not on file     Unable to Get Utilities: Not on file     Unable to Pay for Housing in the Last Year: 2     Has Housin       Subjective         Objective    Physical Exam:   Assessment Type: Assess only  General Appearance: Alert, Awake  Respiratory Pattern: Dyspnea with exertion  Chest Assessment: Chest expansion symmetrical  Bilateral Breath Sounds: Diminished, Crackles  O2 Device: nc    Vitals:  Blood pressure 134/76, pulse 78, temperature 97.7 °F (36.5 °C), resp. rate 18, height 5' 10\" (1.778 m), weight 100 kg (220 lb 10.9 oz), SpO2 95%.          Imaging and other studies:     O2 Device: nc     Plan    Respiratory Plan: Discontinue Protocol        Resp Comments: patient admitted for CHF and renal issues, no pulmonary history, no home O2 or pulm meds, no indication for scheduled nebs at this time, will dc protocol   "

## 2024-12-20 NOTE — CASE MANAGEMENT
Case Management Discharge Planning Note    Patient name Tom Stewart  Location 2 [address] 271-01 MRN [id number]  : 1935 Date 2024       Current Admission Date: 2024  Current Admission Diagnosis:Acute on chronic heart failure with preserved ejection fraction (HCC)   Patient Active Problem List    Diagnosis Date Noted Date Diagnosed    Ulcer of left lower extremity, limited to breakdown of skin (HCC) 2024     CAD s/p PCI to LAD 2024     Encounter for delirium elderly at risk (DEAR) screening 10/28/2024     Frailty syndrome in geriatric patient 10/28/2024     Moderate aortic stenosis 10/28/2024     Acute respiratory failure with hypoxia ([facility]) 10/25/2024     Sebaceous cyst 2024     Nose ulceration 2024     Primary hypertension 2023     Acute on chronic heart failure with preserved ejection fraction (HCC) 2023     Stage 4 chronic kidney disease ([facility]) 2023     Gait instability 2022     Hearing loss 2022     Other Alzheimer's disease ([facility]) 2022     Other proteinuria 2021     Vitamin D deficiency 2021     Ambulatory dysfunction 2021     Fall 2020     Type 2 diabetes mellitus with stage 4 chronic kidney disease, without long-term current use of insulin ([facility]) 2019     Paroxysmal atrial fibrillation ([facility]) 2019     History of CVA 2019     Mixed hyperlipidemia 2017     Coronary artery disease involving native coronary artery of native heart without angina pectoris 2017       LOS (days): 1  Geometric Mean LOS (GMLOS) (days): 3.9  Days to GMLOS:2.9     OBJECTIVE:  Risk of Unplanned Readmission Score: 23.72         Current admission status: Inpatient   Preferred Pharmacy:   [id number] - [place] - [address]  [address]  [place] [address]  Phone: [phone] Fax: [phone]    [facility] [address]  [address]  [address] [address]  Phone: [phone] Fax: [phone]    Primary Care Provider: Beto Garnett DO    Primary Insurance: MEDICARE  Secondary Insurance: [employer]    DISCHARGE DETAILS:    Discharge planning discussed with:: patient's son over the phone  Freedom of Choice: Yes  Comments - Freedom of Choice: CM maintained freedom of choice as it pertains to discharge planning. Patient's son reports preference for patient to be discharged to [facility] at discharge. CM advised that a referral was sent a a call placed and message left but have not heard back from them yet. Patient's son agreeable to plan that if complete care at [facility] is unable to accept at the time that patient is medically cleared from the hospital, they plan for patient to return to [facility] and will have pt transferred to [facility] from [facility] when bed available.  CM contacted family/caregiver?: Yes  Were Treatment Team discharge recommendations reviewed with patient/caregiver?: Yes  Did patient/caregiver verbalize understanding of patient care needs?: N/A- going to facility  Were patient/caregiver advised of the risks associated with not following Treatment Team discharge recommendations?: Yes    Contacts  Patient Contacts: Rohit (Son)  [phone]  Relationship to Patient:: Family  Contact Method: Phone  Phone Number: Rohit (Jose)  [phone]  Reason/Outcome: Continuity of Care, Emergency Contact, Discharge Planning    Requested Home Health Care         Is the patient interested in HHC at discharge?: No    DME Referral Provided  Referral made for DME?: No    Other Referral/Resources/Interventions Provided:  Interventions: Short Term Rehab  Referral Comments: Followed up on STR referral. PVM available for [facility], however they report family preference for pt to be placed at [facility] at discharge. CM sent referral to [facility] in [place] and called them at [phone], was trasferred to admissions phone, no answer, left  w call back number.    Would you like to participate in our [facility] service program?  : No - Declined    Treatment Team Recommendation: SNF  Discharge Destination Plan:: SNF  Transport at Discharge : [facility] Ambulance

## 2024-12-21 LAB
ANION GAP SERPL CALCULATED.3IONS-SCNC: 6 MMOL/L (ref 4–13)
ANION GAP SERPL CALCULATED.3IONS-SCNC: 8 MMOL/L (ref 4–13)
BASOPHILS # BLD AUTO: 0.04 THOUSANDS/ÂΜL (ref 0–0.1)
BASOPHILS NFR BLD AUTO: 1 % (ref 0–1)
BUN SERPL-MCNC: 55 MG/DL (ref 5–25)
BUN SERPL-MCNC: 60 MG/DL (ref 5–25)
CALCIUM SERPL-MCNC: 9.2 MG/DL (ref 8.4–10.2)
CALCIUM SERPL-MCNC: 9.5 MG/DL (ref 8.4–10.2)
CHLORIDE SERPL-SCNC: 89 MMOL/L (ref 96–108)
CHLORIDE SERPL-SCNC: 93 MMOL/L (ref 96–108)
CO2 SERPL-SCNC: 34 MMOL/L (ref 21–32)
CO2 SERPL-SCNC: 38 MMOL/L (ref 21–32)
CREAT SERPL-MCNC: 2.11 MG/DL (ref 0.6–1.3)
CREAT SERPL-MCNC: 2.42 MG/DL (ref 0.6–1.3)
EOSINOPHIL # BLD AUTO: 0.28 THOUSAND/ÂΜL (ref 0–0.61)
EOSINOPHIL NFR BLD AUTO: 5 % (ref 0–6)
ERYTHROCYTE [DISTWIDTH] IN BLOOD BY AUTOMATED COUNT: 14.3 % (ref 11.6–15.1)
GFR SERPL CREATININE-BSD FRML MDRD: 23 ML/MIN/1.73SQ M
GFR SERPL CREATININE-BSD FRML MDRD: 27 ML/MIN/1.73SQ M
GLUCOSE SERPL-MCNC: 150 MG/DL (ref 65–140)
GLUCOSE SERPL-MCNC: 161 MG/DL (ref 65–140)
GLUCOSE SERPL-MCNC: 239 MG/DL (ref 65–140)
GLUCOSE SERPL-MCNC: 295 MG/DL (ref 65–140)
GLUCOSE SERPL-MCNC: 295 MG/DL (ref 65–140)
GLUCOSE SERPL-MCNC: 308 MG/DL (ref 65–140)
GLUCOSE SERPL-MCNC: 334 MG/DL (ref 65–140)
HCT VFR BLD AUTO: 34.6 % (ref 36.5–49.3)
HGB BLD-MCNC: 10.6 G/DL (ref 12–17)
IMM GRANULOCYTES # BLD AUTO: 0.02 THOUSAND/UL (ref 0–0.2)
IMM GRANULOCYTES NFR BLD AUTO: 0 % (ref 0–2)
LYMPHOCYTES # BLD AUTO: 0.78 THOUSANDS/ÂΜL (ref 0.6–4.47)
LYMPHOCYTES NFR BLD AUTO: 14 % (ref 14–44)
MAGNESIUM SERPL-MCNC: 2.1 MG/DL (ref 1.9–2.7)
MAGNESIUM SERPL-MCNC: 2.4 MG/DL (ref 1.9–2.7)
MCH RBC QN AUTO: 29.7 PG (ref 26.8–34.3)
MCHC RBC AUTO-ENTMCNC: 30.6 G/DL (ref 31.4–37.4)
MCV RBC AUTO: 97 FL (ref 82–98)
MONOCYTES # BLD AUTO: 0.36 THOUSAND/ÂΜL (ref 0.17–1.22)
MONOCYTES NFR BLD AUTO: 6 % (ref 4–12)
NEUTROPHILS # BLD AUTO: 4.31 THOUSANDS/ÂΜL (ref 1.85–7.62)
NEUTS SEG NFR BLD AUTO: 74 % (ref 43–75)
NRBC BLD AUTO-RTO: 0 /100 WBCS
PLATELET # BLD AUTO: 162 THOUSANDS/UL (ref 149–390)
PMV BLD AUTO: 10.8 FL (ref 8.9–12.7)
POTASSIUM SERPL-SCNC: 3.9 MMOL/L (ref 3.5–5.3)
POTASSIUM SERPL-SCNC: 3.9 MMOL/L (ref 3.5–5.3)
PROCALCITONIN SERPL-MCNC: 0.14 NG/ML
RBC # BLD AUTO: 3.57 MILLION/UL (ref 3.88–5.62)
SODIUM SERPL-SCNC: 133 MMOL/L (ref 135–147)
SODIUM SERPL-SCNC: 135 MMOL/L (ref 135–147)
WBC # BLD AUTO: 5.79 THOUSAND/UL (ref 4.31–10.16)

## 2024-12-21 PROCEDURE — 80048 BASIC METABOLIC PNL TOTAL CA: CPT | Performed by: INTERNAL MEDICINE

## 2024-12-21 PROCEDURE — 82948 REAGENT STRIP/BLOOD GLUCOSE: CPT

## 2024-12-21 PROCEDURE — 99232 SBSQ HOSP IP/OBS MODERATE 35: CPT | Performed by: NURSE PRACTITIONER

## 2024-12-21 PROCEDURE — 99232 SBSQ HOSP IP/OBS MODERATE 35: CPT | Performed by: INTERNAL MEDICINE

## 2024-12-21 PROCEDURE — 83735 ASSAY OF MAGNESIUM: CPT | Performed by: INTERNAL MEDICINE

## 2024-12-21 PROCEDURE — 99233 SBSQ HOSP IP/OBS HIGH 50: CPT | Performed by: INTERNAL MEDICINE

## 2024-12-21 PROCEDURE — 85025 COMPLETE CBC W/AUTO DIFF WBC: CPT | Performed by: INTERNAL MEDICINE

## 2024-12-21 PROCEDURE — 84145 PROCALCITONIN (PCT): CPT | Performed by: INTERNAL MEDICINE

## 2024-12-21 RX ADMIN — NIFEDIPINE 60 MG: 30 TABLET, FILM COATED, EXTENDED RELEASE ORAL at 09:27

## 2024-12-21 RX ADMIN — INSULIN LISPRO 1 UNITS: 100 INJECTION, SOLUTION INTRAVENOUS; SUBCUTANEOUS at 07:30

## 2024-12-21 RX ADMIN — GABAPENTIN 100 MG: 100 CAPSULE ORAL at 17:53

## 2024-12-21 RX ADMIN — CEFEPIME 1000 MG: 2 INJECTION, POWDER, FOR SOLUTION INTRAVENOUS at 19:59

## 2024-12-21 RX ADMIN — ALPRAZOLAM 0.25 MG: 0.25 TABLET ORAL at 12:16

## 2024-12-21 RX ADMIN — APIXABAN 2.5 MG: 2.5 TABLET, FILM COATED ORAL at 09:27

## 2024-12-21 RX ADMIN — CHLOROTHIAZIDE SODIUM 500 MG: 500 INJECTION, POWDER, LYOPHILIZED, FOR SOLUTION INTRAVENOUS at 10:03

## 2024-12-21 RX ADMIN — APIXABAN 2.5 MG: 2.5 TABLET, FILM COATED ORAL at 17:54

## 2024-12-21 RX ADMIN — FINASTERIDE 5 MG: 5 TABLET, FILM COATED ORAL at 09:27

## 2024-12-21 RX ADMIN — BUMETANIDE 4 MG: 0.25 INJECTION INTRAMUSCULAR; INTRAVENOUS at 17:53

## 2024-12-21 RX ADMIN — INSULIN LISPRO 4 UNITS: 100 INJECTION, SOLUTION INTRAVENOUS; SUBCUTANEOUS at 16:59

## 2024-12-21 RX ADMIN — INSULIN LISPRO 2 UNITS: 100 INJECTION, SOLUTION INTRAVENOUS; SUBCUTANEOUS at 12:18

## 2024-12-21 RX ADMIN — DOCUSATE SODIUM 100 MG: 100 CAPSULE, LIQUID FILLED ORAL at 09:27

## 2024-12-21 RX ADMIN — GABAPENTIN 100 MG: 100 CAPSULE ORAL at 09:27

## 2024-12-21 RX ADMIN — CEFEPIME 1000 MG: 2 INJECTION, POWDER, FOR SOLUTION INTRAVENOUS at 09:22

## 2024-12-21 RX ADMIN — LISINOPRIL 20 MG: 20 TABLET ORAL at 09:27

## 2024-12-21 RX ADMIN — BUMETANIDE 4 MG: 0.25 INJECTION INTRAMUSCULAR; INTRAVENOUS at 09:28

## 2024-12-21 RX ADMIN — DOCUSATE SODIUM 100 MG: 100 CAPSULE, LIQUID FILLED ORAL at 17:53

## 2024-12-21 RX ADMIN — METOPROLOL SUCCINATE 50 MG: 50 TABLET, EXTENDED RELEASE ORAL at 09:27

## 2024-12-21 RX ADMIN — ASPIRIN 81 MG: 81 TABLET, COATED ORAL at 09:27

## 2024-12-21 RX ADMIN — INSULIN LISPRO 2 UNITS: 100 INJECTION, SOLUTION INTRAVENOUS; SUBCUTANEOUS at 21:00

## 2024-12-21 NOTE — PLAN OF CARE
Problem: PAIN - ADULT  Goal: Verbalizes/displays adequate comfort level or baseline comfort level  Description: Interventions:  - Encourage patient to monitor pain and request assistance  - Assess pain using appropriate pain scale  - Administer analgesics based on type and severity of pain and evaluate response  - Implement non-pharmacological measures as appropriate and evaluate response  - Consider cultural and social influences on pain and pain management  - Notify physician/advanced practitioner if interventions unsuccessful or patient reports new pain  Outcome: Progressing     Problem: INFECTION - ADULT  Goal: Absence or prevention of progression during hospitalization  Description: INTERVENTIONS:  - Assess and monitor for signs and symptoms of infection  - Monitor lab/diagnostic results  - Monitor all insertion sites, i.e. indwelling lines, tubes, and drains  - Monitor endotracheal if appropriate and nasal secretions for changes in amount and color  - [place] appropriate cooling/warming therapies per order  - Administer medications as ordered  - Instruct and encourage patient and family to use good hand hygiene technique  - Identify and instruct in appropriate isolation precautions for identified infection/condition  Outcome: Progressing

## 2024-12-21 NOTE — PROGRESS NOTES
"Progress Note - Hospitalist   Name: Tom Stewart 89 y.o. male I MRN: [id number]  Unit/Bed#: 2 E 271-01 I Date of Admission: 12/18/2024   Date of Service: 12/21/2024 I Hospital Day: 2    Assessment & Plan  Acute on chronic heart failure with preserved ejection fraction (HCC)  Wt Readings from Last 3 Encounters:   12/21/24 98.2 kg (216 lb 7.9 oz)   12/11/24 96.1 kg (211 lb 13.8 oz)   12/02/24 96.6 kg (213 lb)     82-year-old male with history of CHF recently discharged approximately 1 week ago after acute CHF exacerbation reports worsening shortness of breath and peripheral edema  At home on Bumex twice daily  Recent echo revealed EF of 35% on December 8th  Cardiology appreciated, was started on IV Lasix upon admission but urine output not good enough hence cardiology changed to Bumex 4 mg IV,  Per nephrology added Diuril 500 mg x 1 dose yesterday and today  Still seems to be clinically volume overloaded  He gets episodes where he seems to be in respiratory distress stating\" I cannot breathe\", if you adjust his oxygen he saturates up to 9798% with 4 L nasal cannula, or if nasal cannula gets displaced his saturation does drop to the 80s.  He also seem to have anxiety.  Discussed with son who agrees patient does have anxiety.  CT chest repeated yesterday shows pulmonary edema and possible multifocal pneumonia, given elevated Pro-Humble will also cover with antibiotic  Also speech and swallow eval    Long discussion with son over the phone today: Son agrees patient has not shown significant improvement.  They have discussed as a family that they would like to give few more days of medical treatment and if there is still no drastic improvement they would consider palliative/comfort route  Paroxysmal atrial fibrillation (HCC)  Currently rate controlled  Continue beta-blocker  Continue Eliquis  Type 2 diabetes mellitus with stage 4 chronic kidney disease, without long-term current use of insulin (HCC)  Hold p.o. " glipizide  Ssi  Monitor BG  Stage 4 chronic kidney disease (HCC)  Baseline creatinine approximately 1.8-2.2  Presented with a creatinine of 2.4  Nephrology on board, discussed with family members if creatinine continues to get worse on aggressive IV diuresis instead of hemodialysis but will possibly choose hospice/palliative route  Primary hypertension  BP currently controlled  Continue nifedipine, lisinopril, metoprolol  Other Alzheimer's disease (HCC)  Currently at baseline mental status  Delirium precautions  Moderate aortic stenosis    Ulcer of left lower extremity, limited to breakdown of skin (HCC)  Patient with bilateral lower extremity chronic wounds  Lower extremity Doppler to rule out DVT  Wound care consult  Nutrition consult  Discussed with RN, change dressing, upload picture in media, heel offload    VTE Pharmacologic Prophylaxis: VTE Score: 7 Moderate Risk (Score 3-4) - Pharmacological DVT Prophylaxis Ordered: apixaban (Eliquis).    Mobility:   Basic Mobility Inpatient Raw Score: 8  -HLM Goal: 3: Sit at edge of bed  JH-HLM Achieved: 1: Laying in bed  JH-HLM Goal NOT achieved. Continue with multidisciplinary rounding and encourage appropriate mobility to improve upon JH-HLM goals.    Patient Centered Rounds:       Discussions with Specialists or Other Care Team Provider: yes    Education and Discussions with Family / Patient: Updated  (son) via phone.    Current Length of Stay: 2 day(s)  Current Patient Status: Inpatient   Certification Statement: The patient will continue to require additional inpatient hospital stay due to ongoing IV Bumex  Discharge Plan: Anticipate discharge in 48-72 hrs to discharge location to be determined pending rehab evaluations.    Code Status: Level 3 - DNAR and DNI    Subjective   Resting in bed, intermittently patient gets very anxious hyperventilates stating he cannot breathe.  Denies chest pain.  Still with poor appetite and very  deconditioned    Objective :  Temp:  [97.3 °F (36.3 °C)-98.7 °F (37.1 °C)] 98.2 °F (36.8 °C)  HR:  [67-79] 70  BP: (124-149)/(66-81) 132/81  Resp:  [17-20] 20  SpO2:  [92 %-98 %] 98 %  O2 Device: Nasal cannula  Nasal Cannula O2 Flow Rate (L/min):  [4 L/min] 4 L/min    Body mass index is 31.06 kg/m².     Input and Output Summary (last 24 hours):     Intake/Output Summary (Last 24 hours) at 12/21/2024 1416  Last data filed at 12/21/2024 1300  Gross per 24 hour   Intake 420 ml   Output 3100 ml   Net -2680 ml       Physical Exam  Constitutional:       Appearance: He is ill-appearing.   HENT:      Mouth/Throat:      Mouth: Mucous membranes are moist.   Eyes:      Pupils: Pupils are equal, round, and reactive to light.   Cardiovascular:      Rate and Rhythm: Normal rate.      Heart sounds: Murmur heard.   Pulmonary:      Effort: Respiratory distress present.      Breath sounds: Rales present.   Abdominal:      General: Bowel sounds are normal.      Palpations: Abdomen is soft.   Musculoskeletal:         General: Normal range of motion.      Cervical back: Normal range of motion.      Right lower leg: Edema present.      Left lower leg: Edema present.   Skin:     General: Skin is warm.   Neurological:      Mental Status: He is alert.      Comments: Pleasantly confused           Lines/Drains:  Lines/Drains/Airways       Active Status       Name Placement date Placement time Site Days    External Urinary Catheter 12/20/24  0000  -- 1                            Lab Results: I have reviewed the following results:   Results from last 7 days   Lab Units 12/21/24  0547   WBC Thousand/uL 5.79   HEMOGLOBIN g/dL 10.6*   HEMATOCRIT % 34.6*   PLATELETS Thousands/uL 162   SEGS PCT % 74   LYMPHO PCT % 14   MONO PCT % 6   EOS PCT % 5     Results from last 7 days   Lab Units 12/21/24  0547 12/19/24  0508 12/18/24  1749   SODIUM mmol/L 135   < > 131*   POTASSIUM mmol/L 3.9   < > 4.6   CHLORIDE mmol/L 93*   < > 92*   CO2 mmol/L 34*   < > 32    BUN mg/dL 55*   < > 59*   CREATININE mg/dL 2.11*   < > 2.47*   ANION GAP mmol/L 8   < > 7   CALCIUM mg/dL 9.2   < > 8.8   ALBUMIN g/dL  --   --  3.5   TOTAL BILIRUBIN mg/dL  --   --  0.67   ALK PHOS U/L  --   --  58   ALT U/L  --   --  16   AST U/L  --   --  14   GLUCOSE RANDOM mg/dL 150*   < > 333*    < > = values in this interval not displayed.         Results from last 7 days   Lab Units 12/21/24  1204 12/21/24  0729 12/20/24  2131 12/20/24  1717 12/20/24  1140 12/20/24  0804 12/19/24  2100 12/19/24  1604 12/19/24  1123 12/19/24  0653 12/18/24  2137   POC GLUCOSE mg/dl 239* 161* 180* 174* 227* 169* 209* 143* 180* 154* 220*         Results from last 7 days   Lab Units 12/21/24  0547   PROCALCITONIN ng/ml 0.14       Recent Cultures (last 7 days):         Imaging Results Review: I reviewed radiology reports from this admission including: CT chest.  Other Study Results Review: No additional pertinent studies reviewed.    Last 24 Hours Medication List:     Current Facility-Administered Medications:     acetaminophen (TYLENOL) tablet 650 mg, Q4H PRN    ALPRAZolam (XANAX) tablet 0.25 mg, HS PRN    apixaban (ELIQUIS) tablet 2.5 mg, BID    aspirin (ECOTRIN LOW STRENGTH) EC tablet 81 mg, Daily    bumetanide (BUMEX) injection 4 mg, BID    cefepime (MAXIPIME) 1,000 mg in dextrose 5 % 50 mL IVPB, Q12H, Last Rate: 1,000 mg (12/21/24 0922)    docusate sodium (COLACE) capsule 100 mg, BID    finasteride (PROSCAR) tablet 5 mg, Daily    gabapentin (NEURONTIN) capsule 100 mg, BID    insulin lispro (HumALOG/ADMELOG) 100 units/mL subcutaneous injection 1-5 Units, TID AC **AND** Fingerstick Glucose (POCT), TID AC    insulin lispro (HumALOG/ADMELOG) 100 units/mL subcutaneous injection 1-5 Units, HS    lisinopril (ZESTRIL) tablet 20 mg, Daily    metoprolol succinate (TOPROL-XL) 24 hr tablet 50 mg, Daily    NIFEdipine (PROCARDIA XL) 24 hr tablet 60 mg, Daily    senna (SENOKOT) tablet 8.6 mg, Daily PRN    Administrative Statements    Today, Patient Was Seen By: Angie Jiang MD  I have spent a total time of 30 minutes in caring for this patient on the day of the visit/encounter including Reviewing / ordering tests, medicine, procedures  .    **Please Note: This note may have been constructed using a voice recognition system.**

## 2024-12-21 NOTE — ASSESSMENT & PLAN NOTE
Blood pressure has been acceptable in last 24 hours and plan to monitor hypertension with lisinopril 20 mg p.o. daily, nifedipine XL 60 mg p.o. daily and Toprol XL 50 mg p.o. daily today.

## 2024-12-21 NOTE — PROGRESS NOTES
NEPHROLOGY PROGRESS NOTE    Patient: Tom Stewart               Sex: male          DOA: 12/18/2024  5:26 PM   YOB: 1935        Age: 89 y.o.         LOS:  LOS: 2 days   Encounter Date: 12/21/2024    REASON FOR THE CONSULTATION: Further management of chronic kidney disease    ASSESSMENT / PLAN     Assessment & Plan  Stage 4 chronic kidney disease (HCC)    Upon epic chart review, previously known baseline creatinine was around 1.9-2.1.  Patient was previously followed by myself but was last seen in nephrology office in September 2023 and seems to have lost follow-up since that time.     Patient had underwent echocardiogram on 12/8/2024 and found to have EF of 35%.  Patient was admitted with volume overload state received Bumex 4 mg IV twice daily along with Diuril 500 mg IV x 1 dose yesterday.  Patient has voided 2.3 L of urine in last 24 hours.  Continue to require 4 L of nasal oxygen and clinically still seems to be in volume overload state.  Will plan to continue IV Bumex and plan another dose of IV Diuril today.    Patient's current creatinine is 2.11 with EGFR of 27 which is close to baseline and will plan to recheck renal function with a.m. lab.    Primary hypertension    Blood pressure has been acceptable in last 24 hours and plan to monitor hypertension with lisinopril 20 mg p.o. daily, nifedipine XL 60 mg p.o. daily and Toprol XL 50 mg p.o. daily today.        Overall above mentioned plan and recommendations were also d/w [facility] physician and they agreed with my plan of IV diuretics as mentioned earlier    Afshan Sandoval MD  Nephrology  12/21/2024    HPI     This is a 89 y.o. male admitted for Acute on chronic heart failure with preserved ejection fraction (HCC)     SUBJECTIVE     -According to patient he is feeling fatigued.  Patient denies nausea, vomiting, headache or dizziness today.  - Reviewed last 24 hrs events     CURRENT MEDICATIONS       Current Facility-Administered Medications:      acetaminophen (TYLENOL) tablet 650 mg, 650 mg, Oral, Q4H PRN, Eliud Day MD, 650 mg at 12/20/24 0601    ALPRAZolam (XANAX) tablet 0.25 mg, 0.25 mg, Oral, HS PRN, Eliud Day MD, 0.25 mg at 12/19/24 2353    apixaban (ELIQUIS) tablet 2.5 mg, 2.5 mg, Oral, BID, Eliud Day MD, 2.5 mg at 12/20/24 1804    aspirin (ECOTRIN LOW STRENGTH) EC tablet 81 mg, 81 mg, Oral, Daily, Eliud Day MD, 81 mg at 12/20/24 0932    bumetanide (BUMEX) injection 4 mg, 4 mg, Intravenous, BID, Natacha Garibay PA-C, 4 mg at 12/20/24 1804    cefepime (MAXIPIME) 1,000 mg in dextrose 5 % 50 mL IVPB, 1,000 mg, Intravenous, Q12H, Angie Jiang MD    chlorothiazide (DIURIL) 500 mg in sodium chloride 0.9 % 100 mL IV, 500 mg, Intravenous, Once, Afshan Sandoval MD    docusate sodium (COLACE) capsule 100 mg, 100 mg, Oral, BID, Natacha Garibay PA-C, 100 mg at 12/20/24 1804    finasteride (PROSCAR) tablet 5 mg, 5 mg, Oral, Daily, Eliud Day MD, 5 mg at 12/20/24 0932    gabapentin (NEURONTIN) capsule 100 mg, 100 mg, Oral, BID, Eliud Day MD, 100 mg at 12/20/24 1804    insulin lispro (HumALOG/ADMELOG) 100 units/mL subcutaneous injection 1-5 Units, 1-5 Units, Subcutaneous, TID AC, 1 Units at 12/20/24 1801 **AND** Fingerstick Glucose (POCT), , , TID AC, Eliud Day MD    insulin lispro (HumALOG/ADMELOG) 100 units/mL subcutaneous injection 1-5 Units, 1-5 Units, Subcutaneous, HS, Eliud Day MD, 1 Units at 12/20/24 2136    lisinopril (ZESTRIL) tablet 20 mg, 20 mg, Oral, Daily, Eliud Day MD, 20 mg at 12/20/24 0932    metoprolol succinate (TOPROL-XL) 24 hr tablet 50 mg, 50 mg, Oral, Daily, Eliud Day MD, 50 mg at 12/20/24 0932    NIFEdipine (PROCARDIA XL) 24 hr tablet 60 mg, 60 mg, Oral, Daily, Eliud Day MD, 60 mg at 12/20/24 0932    senna (SENOKOT) tablet 8.6 mg, 1 tablet, Oral, Daily PRN, Natacha Garibay PA-C, 8.6 mg at 12/20/24 0601    OBJECTIVE     Current Weight: Weight - Scale: 98.2 kg (216 lb 7.9 oz)  /74 (BP  "Location: Left arm)   Pulse 78   Temp 98.7 °F (37.1 °C)   Resp 18   Ht 5' 10\" (1.778 m)   Wt 98.2 kg (216 lb 7.9 oz)   SpO2 97%   BMI 31.06 kg/m²   Vitals:    12/21/24 0730   BP:    Pulse:    Resp:    Temp: 98.7 °F (37.1 °C)   SpO2:      Body mass index is 31.06 kg/m².    Intake/Output Summary (Last 24 hours) at 12/21/2024 0936  Last data filed at 12/21/2024 0552  Gross per 24 hour   Intake 180 ml   Output 2300 ml   Net -2120 ml       PHYSICAL EXAMINATION     Physical Exam  Constitutional:       General: He is not in acute distress.     Appearance: He is ill-appearing.   HENT:      Right Ear: External ear normal.   Eyes:      Conjunctiva/sclera:      Right eye: No hemorrhage.  Neck:      Thyroid: No thyromegaly.   Pulmonary:      Effort: No accessory muscle usage or respiratory distress.   Abdominal:      General: There is no distension.   Musculoskeletal:         General: Swelling present.   Skin:     Coloration: Skin is not jaundiced.   Psychiatric:         Behavior: Behavior is not combative.           LAB RESULTS     Results from last 7 days   Lab Units 12/21/24  0547 12/20/24  0517 12/19/24  1736 12/19/24  0508 12/18/24  1749   WBC Thousand/uL 5.79 6.96  --  7.70 6.70   HEMOGLOBIN g/dL 10.6* 9.6*  --  9.7* 9.6*   HEMATOCRIT % 34.6* 31.2*  --  32.3* 31.0*   PLATELETS Thousands/uL 162 142*  --  143* 147*   SODIUM mmol/L 135 134* 134* 133* 131*   POTASSIUM mmol/L 3.9 4.1 4.1 4.0 4.6   CHLORIDE mmol/L 93* 94* 93* 94* 92*   CO2 mmol/L 34* 34* 33* 33* 32   BUN mg/dL 55* 58* 54* 55* 59*   CREATININE mg/dL 2.11* 2.35* 2.11* 2.40* 2.47*   EGFR ml/min/1.73sq m 27 23 27 23 22   CALCIUM mg/dL 9.2 8.9 9.1 8.9 8.8   MAGNESIUM mg/dL  --   --   --  2.2  --        RADIOLOGY RESULTS      CT chest wo contrast   Final Result by Kolby Ramey MD (12/20 1700)      Diffuse bilateral groundglass and nodular consolidative opacities throughout all lung lobes, small bilateral pleural effusions (left worse than right) and " "passive atelectasis. Differential includes pulmonary edema (favored), multifocal pneumonia, among    other differentials.      Dense aortic valve calcifications. Recommend correlation with echocardiogram to assess degree of aortic stenosis.      Coronary atherosclerosis. Recommend cardiovascular risk assessment.      Additional chronic/incidental findings as detailed above.      The study was marked in EPIC for immediate notification.               Workstation performed: [id number]          VAS VENOUS DUPLEX - LOWER LIMB BILATERAL   Final Result by Arabella Kenny MD (12/19 5023)      XR chest 2 views   ED Interpretation by Mauricio Lozoya DO (12/18 3668)   Diffuse pulmonary vascular congestion as interpreted by me        Final Result by Ifeanyi Castaneda MD (12/19 3387)      Mild pulmonary vascular congestion.      Clinical provider is aware of the findings.            Workstation performed: [id number]             Portions of the record may have been created with voice recognition software. Occasional wrong word or \"sound a like\" substitutions may have occurred due to the inherent limitations of voice recognition software. Read the chart carefully and recognize, using context, where substitutions have occurred.  "

## 2024-12-21 NOTE — ASSESSMENT & PLAN NOTE
Wt Readings from Last 3 Encounters:   12/21/24 98.2 kg (216 lb 7.9 oz)   12/11/24 96.1 kg (211 lb 13.8 oz)   12/02/24 96.6 kg (213 lb)   Remains hypervolemic, continue Bumex 4 mg IV twice daily  Continue Eliquis, aspirin, lisinopril, Toprol, nifedipine  Daily weights, salt restriction  Strict I's and O's  Patient with urinary catheter in place  Last echo 12/8/2024 EF 35%, moderate global hypokinesis with regional variation, ungraded diastolic dysfunction, moderate to severe AS, mild to moderate MR, mild TR, mild to moderate pulmonary hypertension with RVSP 46mmhg

## 2024-12-21 NOTE — PROGRESS NOTES
Progress Note - Cardiology   Name: Tom Stewart 89 y.o. male I MRN: [id number]  Unit/Bed#: 2 E 271-01 I Date of Admission: 12/18/2024   Date of Service: 12/21/2024 I Hospital Day: 2    Assessment & Plan  Acute on chronic heart failure with preserved ejection fraction (HCC)  Wt Readings from Last 3 Encounters:   12/21/24 98.2 kg (216 lb 7.9 oz)   12/11/24 96.1 kg (211 lb 13.8 oz)   12/02/24 96.6 kg (213 lb)   Remains hypervolemic, continue Bumex 4 mg IV twice daily  Continue Eliquis, aspirin, lisinopril, Toprol, nifedipine  Daily weights, salt restriction  Strict I's and O's  Patient with urinary catheter in place  Last echo 12/8/2024 EF 35%, moderate global hypokinesis with regional variation, ungraded diastolic dysfunction, moderate to severe AS, mild to moderate MR, mild TR, mild to moderate pulmonary hypertension with RVSP 46mmhg  Paroxysmal atrial fibrillation (HCC)  Rate controlled 60s to 80s  Continue Eliquis 2.5 twice daily, Toprol 50  Monitor telemetry  Primary hypertension  Controlled   Continue with Toprol, lisinopril, Bumex  Moderate aortic stenosis  Moderate to severe per echo from 11 days ago  Type 2 diabetes mellitus with stage 4 chronic kidney disease, without long-term current use of insulin (HCC)  Lab Results   Component Value Date    HGBA1C 8.5 (H) 12/06/2024   Mgmt per [facility]    Stage 4 chronic kidney disease (HCC)  Lab Results   Component Value Date    EGFR 27 12/21/2024    EGFR 23 12/20/2024    EGFR 27 12/19/2024    CREATININE 2.11 (H) 12/21/2024    CREATININE 2.35 (H) 12/20/2024    CREATININE 2.11 (H) 12/19/2024     Baseline 1.8-2.2  Continue to monitor closely  Other Alzheimer's disease (HCC)  Management per [facility]  Ulcer of left lower extremity, limited to breakdown of skin (HCC)  Management per [facility]     Subjective   Chief Complaint: N/A       Objective :  Temp:  [97.3 °F (36.3 °C)-98.7 °F (37.1 °C)] 98.7 °F (37.1 °C)  HR:  [67-79] 78  BP: (124-149)/(66-76) 145/74  Resp:  [17-19] 18  SpO2:  [92  "%-98 %] 97 %  O2 Device: Nasal cannula  Nasal Cannula O2 Flow Rate (L/min):  [4 L/min] 4 L/min  Orthostatic Blood Pressures      Flowsheet Row Most Recent Value   Blood Pressure 145/74 filed at 12/21/2024 0700   Patient Position - Orthostatic VS Lying filed at 12/21/2024 0700          First Weight: Weight - Scale: 103 kg (227 lb 1.2 oz) (12/18/24 1734)  Vitals:    12/20/24 0601 12/21/24 0552   Weight: 100 kg (220 lb 10.9 oz) 98.2 kg (216 lb 7.9 oz)     Physical Exam  Vitals and nursing note reviewed.   Constitutional:       General: He is not in acute distress.     Appearance: He is well-developed.   HENT:      Head: Normocephalic and atraumatic.   Eyes:      Conjunctiva/sclera: Conjunctivae normal.   Cardiovascular:      Rate and Rhythm: Normal rate and regular rhythm.      Heart sounds: No murmur heard.  Pulmonary:      Effort: Pulmonary effort is normal. No respiratory distress.      Breath sounds: Normal breath sounds.   Abdominal:      Palpations: Abdomen is soft.      Tenderness: There is no abdominal tenderness.   Musculoskeletal:         General: No swelling.      Cervical back: Neck supple.      Right lower leg: Edema present.      Left lower leg: Edema present.   Skin:     General: Skin is warm and dry.   Neurological:      Mental Status: He is alert. He is disoriented.   Psychiatric:         Mood and Affect: Mood normal.           Lab Results: I have reviewed the following results:Creatinine Clearance: Estimated Creatinine Clearance: 29 mL/min (A) (by C-G formula based on SCr of 2.11 mg/dL (H))., Lipid Profile:   , TSH:   , Blood Culture: No results found for: \"BLOODCX\"  Results from last 7 days   Lab Units 12/21/24  0547 12/20/24  0517 12/19/24  0508   WBC Thousand/uL 5.79 6.96 7.70   HEMOGLOBIN g/dL 10.6* 9.6* 9.7*   HEMATOCRIT % 34.6* 31.2* 32.3*   PLATELETS Thousands/uL 162 142* 143*     Results from last 7 days   Lab Units 12/21/24  0547 12/20/24  0517 12/19/24  1736   POTASSIUM mmol/L 3.9 4.1 4.1 "   CHLORIDE mmol/L 93* 94* 93*   CO2 mmol/L 34* 34* 33*   BUN mg/dL 55* 58* 54*   CREATININE mg/dL 2.11* 2.35* 2.11*   CALCIUM mg/dL 9.2 8.9 9.1         Lab Results   Component Value Date    HGBA1C 8.5 (H) 12/06/2024     Lab Results   Component Value Date    TROPONINI 0.02 10/14/2021

## 2024-12-21 NOTE — ASSESSMENT & PLAN NOTE
Upon epic chart review, previously known baseline creatinine was around 1.9-2.1.  Patient was previously followed by myself but was last seen in nephrology office in September 2023 and seems to have lost follow-up since that time.     Patient had underwent echocardiogram on 12/8/2024 and found to have EF of 35%.  Patient was admitted with volume overload state received Bumex 4 mg IV twice daily along with Diuril 500 mg IV x 1 dose yesterday.  Patient has voided 2.3 L of urine in last 24 hours.  Continue to require 4 L of nasal oxygen and clinically still seems to be in volume overload state.  Will plan to continue IV Bumex and plan another dose of IV Diuril today.    Patient's current creatinine is 2.11 with EGFR of 27 which is close to baseline and will plan to recheck renal function with a.m. lab.

## 2024-12-21 NOTE — ASSESSMENT & PLAN NOTE
Lab Results   Component Value Date    EGFR 27 12/21/2024    EGFR 23 12/20/2024    EGFR 27 12/19/2024    CREATININE 2.11 (H) 12/21/2024    CREATININE 2.35 (H) 12/20/2024    CREATININE 2.11 (H) 12/19/2024     Baseline 1.8-2.2  Continue to monitor closely

## 2024-12-21 NOTE — ASSESSMENT & PLAN NOTE
"Wt Readings from Last 3 Encounters:   12/21/24 98.2 kg (216 lb 7.9 oz)   12/11/24 96.1 kg (211 lb 13.8 oz)   12/02/24 96.6 kg (213 lb)     82-year-old male with history of CHF recently discharged approximately 1 week ago after acute CHF exacerbation reports worsening shortness of breath and peripheral edema  At home on Bumex twice daily  Recent echo revealed EF of 35% on December 8th  Cardiology appreciated, was started on IV Lasix upon admission but urine output not good enough hence cardiology changed to Bumex 4 mg IV,  Per nephrology added Diuril 500 mg x 1 dose yesterday and today  Still seems to be clinically volume overloaded  He gets episodes where he seems to be in respiratory distress stating\" I cannot breathe\", if you adjust his oxygen he saturates up to 9798% with 4 L nasal cannula, or if nasal cannula gets displaced his saturation does drop to the 80s.  He also seem to have anxiety.  Discussed with son who agrees patient does have anxiety.  CT chest repeated yesterday shows pulmonary edema and possible multifocal pneumonia, given elevated Pro-Humble will also cover with antibiotic  Also speech and swallow eval    Long discussion with son over the phone today: Son agrees patient has not shown significant improvement.  They have discussed as a family that they would like to give few more days of medical treatment and if there is still no drastic improvement they would consider palliative/comfort route  "

## 2024-12-22 LAB
ANION GAP SERPL CALCULATED.3IONS-SCNC: 6 MMOL/L (ref 4–13)
ANION GAP SERPL CALCULATED.3IONS-SCNC: 6 MMOL/L (ref 4–13)
BASOPHILS # BLD AUTO: 0.04 THOUSANDS/ÂΜL (ref 0–0.1)
BASOPHILS NFR BLD AUTO: 1 % (ref 0–1)
BUN SERPL-MCNC: 60 MG/DL (ref 5–25)
BUN SERPL-MCNC: 61 MG/DL (ref 5–25)
CALCIUM SERPL-MCNC: 9.4 MG/DL (ref 8.4–10.2)
CALCIUM SERPL-MCNC: 9.4 MG/DL (ref 8.4–10.2)
CHLORIDE SERPL-SCNC: 87 MMOL/L (ref 96–108)
CHLORIDE SERPL-SCNC: 89 MMOL/L (ref 96–108)
CO2 SERPL-SCNC: 39 MMOL/L (ref 21–32)
CO2 SERPL-SCNC: 40 MMOL/L (ref 21–32)
CREAT SERPL-MCNC: 2.32 MG/DL (ref 0.6–1.3)
CREAT SERPL-MCNC: 2.37 MG/DL (ref 0.6–1.3)
EOSINOPHIL # BLD AUTO: 0.37 THOUSAND/ÂΜL (ref 0–0.61)
EOSINOPHIL NFR BLD AUTO: 6 % (ref 0–6)
ERYTHROCYTE [DISTWIDTH] IN BLOOD BY AUTOMATED COUNT: 14.2 % (ref 11.6–15.1)
GFR SERPL CREATININE-BSD FRML MDRD: 23 ML/MIN/1.73SQ M
GFR SERPL CREATININE-BSD FRML MDRD: 24 ML/MIN/1.73SQ M
GLUCOSE SERPL-MCNC: 172 MG/DL (ref 65–140)
GLUCOSE SERPL-MCNC: 230 MG/DL (ref 65–140)
GLUCOSE SERPL-MCNC: 239 MG/DL (ref 65–140)
GLUCOSE SERPL-MCNC: 240 MG/DL (ref 65–140)
GLUCOSE SERPL-MCNC: 267 MG/DL (ref 65–140)
GLUCOSE SERPL-MCNC: 303 MG/DL (ref 65–140)
GLUCOSE SERPL-MCNC: 334 MG/DL (ref 65–140)
HCT VFR BLD AUTO: 32.5 % (ref 36.5–49.3)
HGB BLD-MCNC: 10.1 G/DL (ref 12–17)
IMM GRANULOCYTES # BLD AUTO: 0.02 THOUSAND/UL (ref 0–0.2)
IMM GRANULOCYTES NFR BLD AUTO: 0 % (ref 0–2)
LYMPHOCYTES # BLD AUTO: 1.08 THOUSANDS/ÂΜL (ref 0.6–4.47)
LYMPHOCYTES NFR BLD AUTO: 19 % (ref 14–44)
MAGNESIUM SERPL-MCNC: 2 MG/DL (ref 1.9–2.7)
MAGNESIUM SERPL-MCNC: 2 MG/DL (ref 1.9–2.7)
MCH RBC QN AUTO: 29.7 PG (ref 26.8–34.3)
MCHC RBC AUTO-ENTMCNC: 31.1 G/DL (ref 31.4–37.4)
MCV RBC AUTO: 96 FL (ref 82–98)
MONOCYTES # BLD AUTO: 0.44 THOUSAND/ÂΜL (ref 0.17–1.22)
MONOCYTES NFR BLD AUTO: 8 % (ref 4–12)
NEUTROPHILS # BLD AUTO: 3.81 THOUSANDS/ÂΜL (ref 1.85–7.62)
NEUTS SEG NFR BLD AUTO: 66 % (ref 43–75)
NRBC BLD AUTO-RTO: 0 /100 WBCS
PLATELET # BLD AUTO: 163 THOUSANDS/UL (ref 149–390)
PMV BLD AUTO: 10 FL (ref 8.9–12.7)
POTASSIUM SERPL-SCNC: 3.4 MMOL/L (ref 3.5–5.3)
POTASSIUM SERPL-SCNC: 3.7 MMOL/L (ref 3.5–5.3)
RBC # BLD AUTO: 3.4 MILLION/UL (ref 3.88–5.62)
SODIUM SERPL-SCNC: 133 MMOL/L (ref 135–147)
SODIUM SERPL-SCNC: 134 MMOL/L (ref 135–147)
WBC # BLD AUTO: 5.76 THOUSAND/UL (ref 4.31–10.16)

## 2024-12-22 PROCEDURE — 92610 EVALUATE SWALLOWING FUNCTION: CPT

## 2024-12-22 PROCEDURE — 99232 SBSQ HOSP IP/OBS MODERATE 35: CPT | Performed by: NURSE PRACTITIONER

## 2024-12-22 PROCEDURE — 85025 COMPLETE CBC W/AUTO DIFF WBC: CPT | Performed by: INTERNAL MEDICINE

## 2024-12-22 PROCEDURE — 83735 ASSAY OF MAGNESIUM: CPT | Performed by: INTERNAL MEDICINE

## 2024-12-22 PROCEDURE — 82948 REAGENT STRIP/BLOOD GLUCOSE: CPT

## 2024-12-22 PROCEDURE — 80048 BASIC METABOLIC PNL TOTAL CA: CPT | Performed by: INTERNAL MEDICINE

## 2024-12-22 PROCEDURE — 99232 SBSQ HOSP IP/OBS MODERATE 35: CPT | Performed by: STUDENT IN AN ORGANIZED HEALTH CARE EDUCATION/TRAINING PROGRAM

## 2024-12-22 PROCEDURE — 99232 SBSQ HOSP IP/OBS MODERATE 35: CPT | Performed by: INTERNAL MEDICINE

## 2024-12-22 RX ADMIN — INSULIN LISPRO 2 UNITS: 100 INJECTION, SOLUTION INTRAVENOUS; SUBCUTANEOUS at 16:19

## 2024-12-22 RX ADMIN — INSULIN LISPRO 3 UNITS: 100 INJECTION, SOLUTION INTRAVENOUS; SUBCUTANEOUS at 21:41

## 2024-12-22 RX ADMIN — BUMETANIDE 4 MG: 0.25 INJECTION INTRAMUSCULAR; INTRAVENOUS at 08:33

## 2024-12-22 RX ADMIN — DOCUSATE SODIUM 100 MG: 100 CAPSULE, LIQUID FILLED ORAL at 17:16

## 2024-12-22 RX ADMIN — DOCUSATE SODIUM 100 MG: 100 CAPSULE, LIQUID FILLED ORAL at 08:33

## 2024-12-22 RX ADMIN — CHLOROTHIAZIDE SODIUM 500 MG: 500 INJECTION, POWDER, LYOPHILIZED, FOR SOLUTION INTRAVENOUS at 09:22

## 2024-12-22 RX ADMIN — LISINOPRIL 20 MG: 20 TABLET ORAL at 08:33

## 2024-12-22 RX ADMIN — METOPROLOL SUCCINATE 50 MG: 50 TABLET, EXTENDED RELEASE ORAL at 08:32

## 2024-12-22 RX ADMIN — CEFEPIME 1000 MG: 2 INJECTION, POWDER, FOR SOLUTION INTRAVENOUS at 19:39

## 2024-12-22 RX ADMIN — GABAPENTIN 100 MG: 100 CAPSULE ORAL at 08:33

## 2024-12-22 RX ADMIN — CEFEPIME 1000 MG: 2 INJECTION, POWDER, FOR SOLUTION INTRAVENOUS at 08:40

## 2024-12-22 RX ADMIN — ASPIRIN 81 MG: 81 TABLET, COATED ORAL at 08:32

## 2024-12-22 RX ADMIN — BUMETANIDE 4 MG: 0.25 INJECTION INTRAMUSCULAR; INTRAVENOUS at 17:16

## 2024-12-22 RX ADMIN — APIXABAN 2.5 MG: 2.5 TABLET, FILM COATED ORAL at 08:32

## 2024-12-22 RX ADMIN — INSULIN LISPRO 2 UNITS: 100 INJECTION, SOLUTION INTRAVENOUS; SUBCUTANEOUS at 02:35

## 2024-12-22 RX ADMIN — APIXABAN 2.5 MG: 2.5 TABLET, FILM COATED ORAL at 17:16

## 2024-12-22 RX ADMIN — FINASTERIDE 5 MG: 5 TABLET, FILM COATED ORAL at 08:32

## 2024-12-22 RX ADMIN — GABAPENTIN 100 MG: 100 CAPSULE ORAL at 17:16

## 2024-12-22 RX ADMIN — ALPRAZOLAM 0.25 MG: 0.25 TABLET ORAL at 02:30

## 2024-12-22 RX ADMIN — INSULIN LISPRO 1 UNITS: 100 INJECTION, SOLUTION INTRAVENOUS; SUBCUTANEOUS at 08:15

## 2024-12-22 RX ADMIN — NIFEDIPINE 60 MG: 30 TABLET, FILM COATED, EXTENDED RELEASE ORAL at 08:33

## 2024-12-22 NOTE — PROGRESS NOTES
Progress Note - Cardiology   Name: Tom Stewart 89 y.o. male I MRN: [id number]  Unit/Bed#: 2 E 271-01 I Date of Admission: 12/18/2024   Date of Service: 12/22/2024 I Hospital Day: 3    Assessment & Plan  Acute on chronic heart failure with preserved ejection fraction (HCC)  Wt Readings from Last 3 Encounters:   12/22/24 98 kg (216 lb 0.8 oz)   12/11/24 96.1 kg (211 lb 13.8 oz)   12/02/24 96.6 kg (213 lb)   His volume status is overall improving, continue management of diuretics per nephrology  Currently on Bumex 4 mg IV twice daily  Continue Eliquis, aspirin, lisinopril, Toprol, nifedipine  Daily weights, salt restriction  Strict I's and O's  Last echo 12/8/2024 EF 35%, moderate global hypokinesis with regional variation, ungraded diastolic dysfunction, moderate to severe AS, mild to moderate MR, mild TR, mild to moderate pulmonary hypertension with RVSP 46mmhg  Paroxysmal atrial fibrillation (HCC)  Rate controlled 60s to 80s  Continue Eliquis 2.5 twice daily, Toprol 50  Monitor telemetry  Primary hypertension  Controlled   Continue with Toprol, lisinopril, Bumex  Moderate aortic stenosis  Moderate to severe per echo from 11 days ago  Type 2 diabetes mellitus with stage 4 chronic kidney disease, without long-term current use of insulin (HCC)  Lab Results   Component Value Date    HGBA1C 8.5 (H) 12/06/2024   Mgmt per [facility]    Stage 4 chronic kidney disease (HCC)  Lab Results   Component Value Date    EGFR 23 12/22/2024    EGFR 23 12/21/2024    EGFR 27 12/21/2024    CREATININE 2.37 (H) 12/22/2024    CREATININE 2.42 (H) 12/21/2024    CREATININE 2.11 (H) 12/21/2024     Baseline 1.8-2.2  Continue to monitor closely  Other Alzheimer's disease (HCC)  Management per [facility]  Ulcer of left lower extremity, limited to breakdown of skin (HCC)  Management per [facility]     Subjective   Chief Complaint: feeling okay, alert today       Objective :  Temp:  [97.5 °F (36.4 °C)-98.6 °F (37 °C)] 97.7 °F (36.5 °C)  HR:  [72-81] 78  BP:  "(120-133)/(61-74) 123/74  Resp:  [18-20] 18  SpO2:  [96 %-99 %] 96 %  O2 Device: Nasal cannula  Nasal Cannula O2 Flow Rate (L/min):  [4 L/min-5 L/min] 5 L/min  Orthostatic Blood Pressures      Flowsheet Row Most Recent Value   Blood Pressure 123/74 filed at 12/22/2024 0732   Patient Position - Orthostatic VS Sitting filed at 12/22/2024 0229          First Weight: Weight - Scale: 103 kg (227 lb 1.2 oz) (12/18/24 1734)  Vitals:    12/21/24 0552 12/22/24 0600   Weight: 98.2 kg (216 lb 7.9 oz) 98 kg (216 lb 0.8 oz)     Physical Exam  Vitals and nursing note reviewed.   Constitutional:       General: He is not in acute distress.     Appearance: He is well-developed.   HENT:      Head: Normocephalic and atraumatic.   Eyes:      Conjunctiva/sclera: Conjunctivae normal.   Cardiovascular:      Rate and Rhythm: Normal rate and regular rhythm.      Heart sounds: No murmur heard.  Pulmonary:      Effort: Pulmonary effort is normal. No respiratory distress.      Breath sounds: Normal breath sounds.   Abdominal:      Palpations: Abdomen is soft.      Tenderness: There is no abdominal tenderness.   Musculoskeletal:         General: No swelling.      Cervical back: Neck supple.      Right lower leg: Edema present.      Left lower leg: Edema present.   Skin:     General: Skin is warm and dry.      Capillary Refill: Capillary refill takes less than 2 seconds.   Neurological:      Mental Status: He is alert.   Psychiatric:         Mood and Affect: Mood normal.           Lab Results: I have reviewed the following results:CBC/BMP:   .     12/22/24  0513 12/22/24  1019   WBC 5.76  --    HGB 10.1*  --    HCT 32.5*  --      --    SODIUM  --  134*   K  --  3.4*   CL  --  89*   CO2  --  39*   BUN  --  61*   CREATININE  --  2.37*   GLUC  --  239*   MG  --  2.0    , Lipid Profile:   , TSH:   , Blood Culture: No results found for: \"BLOODCX\"  Results from last 7 days   Lab Units 12/22/24  0513 12/21/24  0547 12/20/24  0517   WBC " Thousand/uL 5.76 5.79 6.96   HEMOGLOBIN g/dL 10.1* 10.6* 9.6*   HEMATOCRIT % 32.5* 34.6* 31.2*   PLATELETS Thousands/uL 163 162 142*     Results from last 7 days   Lab Units 12/22/24  1019 12/21/24  1932 12/21/24  0547   POTASSIUM mmol/L 3.4* 3.9 3.9   CHLORIDE mmol/L 89* 89* 93*   CO2 mmol/L 39* 38* 34*   BUN mg/dL 61* 60* 55*   CREATININE mg/dL 2.37* 2.42* 2.11*   CALCIUM mg/dL 9.4 9.5 9.2         Lab Results   Component Value Date    HGBA1C 8.5 (H) 12/06/2024     Lab Results   Component Value Date    TROPONINI 0.02 10/14/2021

## 2024-12-22 NOTE — PROGRESS NOTES
NEPHROLOGY PROGRESS NOTE    Patient: Tom Stewart               Sex: male          DOA: 12/18/2024  5:26 PM   YOB: 1935        Age: 89 y.o.         LOS:  LOS: 3 days   Encounter Date: 12/22/2024    REASON FOR THE CONSULTATION: Further management of CKD stage 4    ASSESSMENT / PLAN     Assessment & Plan  Stage 4 chronic kidney disease (HCC)    Upon epic chart review, previously known baseline creatinine was around 1.9-2.1.  Patient was previously followed by myself but was last seen in nephrology office in September 2023 and seems to have lost follow-up since that time.     Patient had underwent echocardiogram on 12/8/2024 and found to have EF of 35%.  Patient is currently receiving Bumex 4 mg IV twice daily and also received Diuril 500 mg IV x 1 dose yesterday.  Patient has voided 1.9 L of urine and still requiring nasal oxygen at 4-5 L/min but told me he is feeling slightly better.  BMP was not done this morning but since patient is still requiring high amount of oxygen, we will plan to continue IV Bumex and plan another dose of IV Diuril today.  Last known creatinine from yesterday is 2.11 with EGFR of 27.  Plan to check renal function with a.m. labs.    Despite of aggressive diuretics, clinically patient has failed to improve much and looking at overall clinical picture, I think palliative care would be appropriate if family is agreeable.    Primary hypertension    Blood pressures currently acceptable and plan to monitor hypertension with lisinopril 20 mg p.o. daily, nifedipine XL 60 mg p.o. daily and Toprol XL 50 mg p.o. daily today.      Overall above mentioned plan and recommendations were also d/w current [facility] physician and they agreed with my plan of diuretics as mentioned earlier    Afshan Sandoval MD  Nephrology  12/22/2024    HPI     This is a 89 y.o. male admitted for Acute on chronic heart failure with preserved ejection fraction (HCC)     SUBJECTIVE     -Requiring nasal oxygen 4-5  L/min.  Voided 1.9 L of urine in last 24 hours.  Patient denies nausea, vomiting, headache or dizziness today.  - Reviewed last 24 hrs events     CURRENT MEDICATIONS       Current Facility-Administered Medications:     acetaminophen (TYLENOL) tablet 650 mg, 650 mg, Oral, Q4H PRN, Eliud Day MD, 650 mg at 12/20/24 0601    ALPRAZolam (XANAX) tablet 0.25 mg, 0.25 mg, Oral, HS PRN, Eliud Day MD, 0.25 mg at 12/22/24 0230    apixaban (ELIQUIS) tablet 2.5 mg, 2.5 mg, Oral, BID, Eliud Day MD, 2.5 mg at 12/22/24 0832    aspirin (ECOTRIN LOW STRENGTH) EC tablet 81 mg, 81 mg, Oral, Daily, Eliud Day MD, 81 mg at 12/22/24 0832    bumetanide (BUMEX) injection 4 mg, 4 mg, Intravenous, BID, Natacha Garibay PA-C, 4 mg at 12/22/24 0833    cefepime (MAXIPIME) 1,000 mg in dextrose 5 % 50 mL IVPB, 1,000 mg, Intravenous, Q12H, Angie Jiang MD, Last Rate: 100 mL/hr at 12/22/24 0840, 1,000 mg at 12/22/24 0840    docusate sodium (COLACE) capsule 100 mg, 100 mg, Oral, BID, Natacha Garibay PA-C, 100 mg at 12/22/24 0833    finasteride (PROSCAR) tablet 5 mg, 5 mg, Oral, Daily, Eliud Day MD, 5 mg at 12/22/24 0832    gabapentin (NEURONTIN) capsule 100 mg, 100 mg, Oral, BID, Eliud Day MD, 100 mg at 12/22/24 0833    insulin lispro (HumALOG/ADMELOG) 100 units/mL subcutaneous injection 1-5 Units, 1-5 Units, Subcutaneous, TID AC, 1 Units at 12/22/24 0815 **AND** Fingerstick Glucose (POCT), , , TID AC, Eliud Day MD    insulin lispro (HumALOG/ADMELOG) 100 units/mL subcutaneous injection 1-5 Units, 1-5 Units, Subcutaneous, HS, Eliud Day MD, 2 Units at 12/21/24 2100    lisinopril (ZESTRIL) tablet 20 mg, 20 mg, Oral, Daily, Eliud Day MD, 20 mg at 12/22/24 0833    metoprolol succinate (TOPROL-XL) 24 hr tablet 50 mg, 50 mg, Oral, Daily, Eliud Day MD, 50 mg at 12/22/24 0832    NIFEdipine (PROCARDIA XL) 24 hr tablet 60 mg, 60 mg, Oral, Daily, Eliud Day MD, 60 mg at 12/22/24 0833    senna (SENOKOT) tablet 8.6 mg,  "1 tablet, Oral, Daily PRN, Natacha Garibay PA-C, 8.6 mg at 12/20/24 0601    OBJECTIVE     Current Weight: Weight - Scale: 98 kg (216 lb 0.8 oz)  /74   Pulse 78   Temp 97.7 °F (36.5 °C)   Resp 18   Ht 5' 10\" (1.778 m)   Wt 98 kg (216 lb 0.8 oz)   SpO2 96%   BMI 31.00 kg/m²   Vitals:    12/22/24 0732   BP: 123/74   Pulse: 78   Resp:    Temp: 97.7 °F (36.5 °C)   SpO2: 96%     Body mass index is 31 kg/m².    Intake/Output Summary (Last 24 hours) at 12/22/2024 1015  Last data filed at 12/22/2024 0900  Gross per 24 hour   Intake 860 ml   Output 1925 ml   Net -1065 ml       PHYSICAL EXAMINATION     Physical Exam  Constitutional:       General: He is not in acute distress.     Appearance: He is ill-appearing.   HENT:      Right Ear: External ear normal.   Eyes:      Conjunctiva/sclera:      Right eye: No hemorrhage.  Neck:      Thyroid: No thyromegaly.   Pulmonary:      Effort: No accessory muscle usage or respiratory distress.   Abdominal:      General: There is no distension.   Musculoskeletal:         General: Swelling present.   Skin:     Coloration: Skin is not jaundiced.   Psychiatric:         Behavior: Behavior is not combative.           LAB RESULTS     Results from last 7 days   Lab Units 12/22/24  0513 12/21/24  1932 12/21/24  0547 12/20/24  0517 12/19/24  1736 12/19/24  0508 12/18/24  1749   WBC Thousand/uL 5.76  --  5.79 6.96  --  7.70 6.70   HEMOGLOBIN g/dL 10.1*  --  10.6* 9.6*  --  9.7* 9.6*   HEMATOCRIT % 32.5*  --  34.6* 31.2*  --  32.3* 31.0*   PLATELETS Thousands/uL 163  --  162 142*  --  143* 147*   SODIUM mmol/L  --  133* 135 134* 134* 133* 131*   POTASSIUM mmol/L  --  3.9 3.9 4.1 4.1 4.0 4.6   CHLORIDE mmol/L  --  89* 93* 94* 93* 94* 92*   CO2 mmol/L  --  38* 34* 34* 33* 33* 32   BUN mg/dL  --  60* 55* 58* 54* 55* 59*   CREATININE mg/dL  --  2.42* 2.11* 2.35* 2.11* 2.40* 2.47*   EGFR ml/min/1.73sq m  --  23 27 23 27 23 22   CALCIUM mg/dL  --  9.5 9.2 8.9 9.1 8.9 8.8   MAGNESIUM mg/dL  --  " "2.1 2.4  --   --  2.2  --        RADIOLOGY RESULTS      CT chest wo contrast   Final Result by Kolby Ramey MD (12/20 1700)      Diffuse bilateral groundglass and nodular consolidative opacities throughout all lung lobes, small bilateral pleural effusions (left worse than right) and passive atelectasis. Differential includes pulmonary edema (favored), multifocal pneumonia, among    other differentials.      Dense aortic valve calcifications. Recommend correlation with echocardiogram to assess degree of aortic stenosis.      Coronary atherosclerosis. Recommend cardiovascular risk assessment.      Additional chronic/incidental findings as detailed above.      The study was marked in EPIC for immediate notification.               Workstation performed: [id number]          VAS VENOUS DUPLEX - LOWER LIMB BILATERAL   Final Result by Arabella Kenny MD (12/19 1512)      XR chest 2 views   ED Interpretation by Mauricio Lozoya DO (12/18 1808)   Diffuse pulmonary vascular congestion as interpreted by me        Final Result by Ifeanyi Castaneda MD (12/19 0820)      Mild pulmonary vascular congestion.      Clinical provider is aware of the findings.            Workstation performed: [id number]             Portions of the record may have been created with voice recognition software. Occasional wrong word or \"sound a like\" substitutions may have occurred due to the inherent limitations of voice recognition software. Read the chart carefully and recognize, using context, where substitutions have occurred.  "

## 2024-12-22 NOTE — SPEECH THERAPY NOTE
Speech-Language Pathology Bedside Swallow Evaluation        Patient Name: Tom Stewart  Today's Date: 12/22/2024     Problem List  Principal Problem:    Acute on chronic heart failure with preserved ejection fraction (HCC)  Active Problems:    Paroxysmal atrial fibrillation (HCC)    Type 2 diabetes mellitus with stage 4 chronic kidney disease, without long-term current use of insulin (HCC)    Stage 4 chronic kidney disease (HCC)    Primary hypertension    Other Alzheimer's disease (HCC)    Moderate aortic stenosis    Ulcer of left lower extremity, limited to breakdown of skin (HCC)       Past Medical History  Past Medical History:   Diagnosis Date    Acute deep vein thrombosis (DVT) of brachial vein of left upper extremity (HCC) 11/24/2017    Acute deep vein thrombosis (DVT) of left peroneal vein ([facility])     Acute ST elevation myocardial infarction ([facility])     Aortic valve stenosis     Arthritis     Atrial fibrillation (HCC)     Basilar artery stenosis     Basilar artery stenosis     Basilar artery stenosis 05/04/2020    MRA Head wo contrast (12/13/2019)  IMPRESSION:   Multifocal intracranial atherosclerotic disease with moderate to severe stenosis at the origin of the left M1 segment with additional atherosclerotic disease noted in the distal right M1 and proximal inferior left M2 branches.   Moderate to severe stenosis in the proximal and mid basilar artery with nonvisualization of the right intradural vertebral    Benign prostatic hyperplasia with lower urinary tract symptoms     CAD (coronary artery disease)     CAD in native artery 11/24/2017    Underwent cardiac catheterization on 1/30/2020 and had mid and distal LAD stent placed  Cardiac PCI (1/30/2020)  SUMMARY   CORONARY CIRCULATION: Mid LAD: There was a 90 % stenosis at the site of a prior stent. Distal LAD: There was a 90 % stenosis at the site of a prior stent. Left posterior descending artery: There was a diffuse 50 % stenosis.   1ST LESION INTERVENTIONS:  A balloon angioplasty wit    Cerebrovascular small vessel disease 11/12/2020    Chronic kidney disease     Closed fracture of multiple ribs of left side with routine healing 09/03/2020    Coronary artery disease     Dementia (HCC)     Diabetes mellitus (HCC)     DM2 (diabetes mellitus, type 2) (HCC)     Elevated troponin 07/19/2021    Herpes zoster without complication 07/28/2021    History of CVA (cerebrovascular accident) 02/21/2019    History of DVT of peroneal vein left lower extremity 12/16/2019    History of transfusion     HLD (hyperlipidemia)     HTN (hypertension)     Infected sebaceous cyst of skin 06/08/2021    Lump in chest 06/01/2021    Middle cerebral artery stenosis     Mild to moderate aortic stenosis 10/09/2018    ECHO (7/2020)  AORTIC VALVE: Leaflets exhibited moderately increased thickness and moderate calcification. Transaortic velocity was increased due to valvular stenosis. There was mild to moderate stenosis. RICHY 1.4cm, mean pressure gradient 11mmHg, peak velocity 2.15m/s There was mild regurgitation.    Myocardial infarction ([facility])     Near syncope 07/19/2021    Other proteinuria 10/08/2019    Proteinuria     Rib fractures (right) 07/31/2020    Stroke ([facility])     Symptomatic bradycardia     Transient cerebral ischemia     Vitamin D deficiency        Past Surgical History  Past Surgical History:   Procedure Laterality Date    CARDIAC CATHETERIZATION      Outcome: successful; last assessed: 02/03/2015    CARDIAC CATHETERIZATION  11/26/2019    CORONARY ANGIOPLASTY WITH STENT PLACEMENT  2008    stent to LAD     CORONARY ARTERY BYPASS GRAFT      HAND SURGERY      thumb    HIP HARDWARE REMOVAL      JOINT REPLACEMENT      TOTAL HIP ARTHROPLASTY Right     TOTAL HIP ARTHROPLASTY Bilateral        Summary/Impressions:    Pt presents with s/s oropharyngeal dysphagia characterized by increased coughing w/ regular solid texture.  Note coughing evident throughout, including prior to PO offerings.  However,  coughing does appear to increase following intake of harder solid texture. Demonstrates prolonged mastication and oral transfer of same.  No overt s/s of aspiration noted otherwise; manages small quantities of liquids w/o difficulties.     Recommendations:   Diet: soft/level 3 diet and thin liquids   Meds: whole with puree   Feeding assistance: 1:1 feeding assist   Frequent Oral care: 2-4x/day  Aspiration precautions and compensatory swallowing strategies: upright posture, only feed when fully alert, slow rate of feeding, small bites/sips, and alternating bites and sips  Other Recommendations/ considerations: SLP will continue to follow to ensure adequate management of oral diet and adjust as clinically indicated x1-2       Current Medical Status  Pt is a 89 y.o. male who presented to [facility] with history of CHF recently discharged approximately 1 week ago after acute CHF exacerbation reports worsening shortness of breath and peripheral edema.  CT chest repeated yesterday shows pulmonary edema and possible multifocal pneumonia, given elevated Pro-Humble will also cover with antibiotic.      Past medical history:  Please see H&P for details    Special Studies:  12/20/24 CT chest:  Diffuse bilateral groundglass and nodular consolidative opacities throughout all lung lobes, small bilateral pleural effusions (left worse than right) and passive atelectasis. Differential includes pulmonary edema (favored), multifocal pneumonia, among   other differentials.  Dense aortic valve calcifications. Recommend correlation with echocardiogram to assess degree of aortic stenosis.  Coronary atherosclerosis. Recommend cardiovascular risk assessment.    Social/Education/Vocational Hx:  Pt lives in SNF/ECF    Swallow Information   Current Risks for Dysphagia & Aspiration:  PNA  Current Symptoms/Concerns: coughing with po and change in respiratory status  Current Diet: regular diet and thin liquids   Baseline Diet: regular diet  and thin liquids- presumed     Baseline Assessment   Behavior/Cognition: alert  Speech/Language Status: able to participate in basic conversation  Patient Positioning: upright in bed    Swallow Mechanism Exam   Facial: symmetrical  Labial: WFL  Lingual: WFL  Velum: unable to visualize  Mandible: adequate ROM  Dentition: adequate  Vocal quality:clear/adequate   Volitional Cough: strong/productive   Respiratory: 5L NC     Consistencies Assessed and Performance   Consistencies Administered: thin liquids, nectar thick, honey thick, puree, soft solids, and hard solids    Oral Stage: Adequate bolus retrieval and containment.  Mastication prolonged, transfer prolonged.  Min oral retention (solids> liq).      Pharyngeal Stage: Laryngeal rise noted upon palpation.  Swallow initiation appears delayed yet functional for age.  Episodic cough noted throughout, noted to increased with regular solid texture.  No overt s/s of aspiration or distress. Pt denies difficulties.     Esophageal Concerns: none reported        Results Reviewed with: patient, RN, and MD     Plan  Will continue to follow for x1-3    Dysphagia Goals: pt will tolerate dysphagia 3 with thin without s/s of aspiration x1-3        Leann Lara MS, CCC-SLP  Speech-Language Pathologist  [place] #[id number]  [place] #[id number]

## 2024-12-22 NOTE — ASSESSMENT & PLAN NOTE
Baseline creatinine approximately 1.8-2.2  Presented with a creatinine of 2.4  Nephrology on board, discussed with family members if creatinine continues to get worse on aggressive IV diuresis instead of hemodialysis but will possibly choose hospice/palliative route, palliative care consulted. Appreciate recommendations.

## 2024-12-22 NOTE — PROGRESS NOTES
"Progress Note - Hospitalist   Name: Tom Stewart 89 y.o. male I MRN: [id number]  Unit/Bed#: 2 E 271-01 I Date of Admission: 12/18/2024   Date of Service: 12/22/2024 I Hospital Day: 3    Assessment & Plan  Acute on chronic heart failure with preserved ejection fraction (HCC)  Wt Readings from Last 3 Encounters:   12/22/24 98 kg (216 lb 0.8 oz)   12/11/24 96.1 kg (211 lb 13.8 oz)   12/02/24 96.6 kg (213 lb)     82-year-old male with history of CHF recently discharged approximately 1 week ago after acute CHF exacerbation reports worsening shortness of breath and peripheral edema  At home on Bumex twice daily  Recent echo revealed EF of 35% on December 8th  Cardiology appreciated, was started on IV Lasix upon admission but urine output not good enough hence cardiology changed to Bumex 4 mg IV,  Per nephrology added Diuril 500 mg x 1 dose today as well  Still seems to be clinically volume overloaded  He gets episodes where he seems to be in respiratory distress stating\" I cannot breathe\", if you adjust his oxygen he saturates up to 9798% with 4 L nasal cannula, or if nasal cannula gets displaced his saturation does drop to the 80s.  He also seem to have anxiety.  Discussed with son who agrees patient does have anxiety.  CT chest repeated yesterday shows pulmonary edema and possible multifocal pneumonia, given elevated Pro-Humble will also cover with antibiotic  Also speech and swallow eval      Paroxysmal atrial fibrillation (HCC)  Currently rate controlled  Continue beta-blocker  Continue Eliquis  Type 2 diabetes mellitus with stage 4 chronic kidney disease, without long-term current use of insulin (HCC)  Hold p.o. glipizide  Ssi  Monitor BG  Stage 4 chronic kidney disease (HCC)  Baseline creatinine approximately 1.8-2.2  Presented with a creatinine of 2.4  Nephrology on board, discussed with family members if creatinine continues to get worse on aggressive IV diuresis instead of hemodialysis but will possibly choose " hospice/palliative route, palliative care consulted. Appreciate recommendations.   Primary hypertension  BP currently controlled  Continue nifedipine, lisinopril, metoprolol  Other Alzheimer's disease (HCC)  Currently at baseline mental status  Delirium precautions  Moderate aortic stenosis    Ulcer of left lower extremity, limited to breakdown of skin (HCC)  Patient with bilateral lower extremity chronic wounds  Lower extremity Doppler to rule out DVT  Wound care consult  Nutrition consult  Discussed with RN, change dressing, upload picture in media, heel offload    VTE Pharmacologic Prophylaxis: VTE Score: 7 Moderate Risk (Score 3-4) - Pharmacological DVT Prophylaxis Ordered: apixaban (Eliquis).    Mobility:   Basic Mobility Inpatient Raw Score: 8  JH-HLM Goal: 3: Sit at edge of bed  JH-HLM Achieved: 1: Laying in bed  JH-HLM Goal achieved. Continue to encourage appropriate mobility.    Patient Centered Rounds: I performed bedside rounds with nursing staff today.   Discussions with Specialists or Other Care Team Provider: CM    Education and Discussions with Family / Patient: Updated  (son) via phone.    Current Length of Stay: 3 day(s)  Current Patient Status: Inpatient   Certification Statement: The patient will continue to require additional inpatient hospital stay due to CM, nephrology  Discharge Plan: Anticipate discharge in 24-48 hrs to discharge location to be determined pending rehab evaluations.    Code Status: Level 3 - DNAR and DNI    Subjective   Pt states he feels well. Denies chest pain or sob.     Objective :  Temp:  [97.5 °F (36.4 °C)-98.6 °F (37 °C)] 98 °F (36.7 °C)  HR:  [63-81] 63  BP: (120-135)/(61-77) 135/77  Resp:  [18-20] 18  SpO2:  [96 %-99 %] 98 %  O2 Device: Nasal cannula  Nasal Cannula O2 Flow Rate (L/min):  [4 L/min-5 L/min] 5 L/min    Body mass index is 31 kg/m².     Input and Output Summary (last 24 hours):     Intake/Output Summary (Last 24 hours) at 12/22/2024  1311  Last data filed at 12/22/2024 1300  Gross per 24 hour   Intake 620 ml   Output 1925 ml   Net -1305 ml       Physical Exam  Constitutional:       General: He is not in acute distress.     Appearance: He is well-developed. He is not diaphoretic.   HENT:      Head: Normocephalic and atraumatic.      Mouth/Throat:      Pharynx: No oropharyngeal exudate.   Eyes:      General: No scleral icterus.     Extraocular Movements: Extraocular movements intact.      Conjunctiva/sclera: Conjunctivae normal.   Neck:      Vascular: No JVD.      Trachea: No tracheal deviation.   Cardiovascular:      Rate and Rhythm: Normal rate and regular rhythm.      Heart sounds: No murmur heard.     No friction rub. No gallop.   Pulmonary:      Effort: Pulmonary effort is normal. No respiratory distress.      Breath sounds: No stridor. Wheezing present.   Abdominal:      General: There is no distension.      Palpations: Abdomen is soft. There is no mass.      Tenderness: There is no abdominal tenderness. There is no right CVA tenderness or left CVA tenderness.   Musculoskeletal:         General: No tenderness. Normal range of motion.      Right lower leg: Edema present.      Left lower leg: Edema present.   Skin:     General: Skin is warm.      Coloration: Skin is pale.      Findings: No erythema.   Neurological:      Comments: Oriented to person and place   Psychiatric:         Behavior: Behavior normal.         Thought Content: Thought content normal.         Lines/Drains:  Lines/Drains/Airways       Active Status       Name Placement date Placement time Site Days    External Urinary Catheter 12/20/24  0000  -- 2                      Telemetry:  Telemetry Orders (From admission, onward)               24 Hour Telemetry Monitoring  Continuous x 24 Hours (Telem)        Expiring   Question:  Reason for 24 Hour Telemetry  Answer:  Decompensated CHF- and any one of the following: continuous diuretic infusion or total diuretic dose >200 mg daily,  associated electrolyte derangement (I.e. K < 3.0), inotropic drip (continuous infusion), hx of ventricular arrhythmia, or new EF < 35%                     Telemetry Reviewed: Normal Sinus Rhythm  Indication for Continued Telemetry Use: Acute CHF on >200 mg lasix/day or equivalent dose or with new reduced EF.                Lab Results: I have reviewed the following results:   Results from last 7 days   Lab Units 12/22/24  0513   WBC Thousand/uL 5.76   HEMOGLOBIN g/dL 10.1*   HEMATOCRIT % 32.5*   PLATELETS Thousands/uL 163   SEGS PCT % 66   LYMPHO PCT % 19   MONO PCT % 8   EOS PCT % 6     Results from last 7 days   Lab Units 12/22/24  1019 12/19/24  0508 12/18/24  1749   SODIUM mmol/L 134*   < > 131*   POTASSIUM mmol/L 3.4*   < > 4.6   CHLORIDE mmol/L 89*   < > 92*   CO2 mmol/L 39*   < > 32   BUN mg/dL 61*   < > 59*   CREATININE mg/dL 2.37*   < > 2.47*   ANION GAP mmol/L 6   < > 7   CALCIUM mg/dL 9.4   < > 8.8   ALBUMIN g/dL  --   --  3.5   TOTAL BILIRUBIN mg/dL  --   --  0.67   ALK PHOS U/L  --   --  58   ALT U/L  --   --  16   AST U/L  --   --  14   GLUCOSE RANDOM mg/dL 239*   < > 333*    < > = values in this interval not displayed.         Results from last 7 days   Lab Units 12/22/24  1236 12/22/24  0813 12/22/24  0525 12/21/24  2056 12/21/24  1928 12/21/24  1650 12/21/24  1204 12/21/24  0729 12/20/24  2131 12/20/24  1717 12/20/24  1140 12/20/24  0804   POC GLUCOSE mg/dl 230* 172* 240* 295* 308* 334* 239* 161* 180* 174* 227* 169*         Results from last 7 days   Lab Units 12/21/24  0547   PROCALCITONIN ng/ml 0.14       Recent Cultures (last 7 days):         Imaging Results Review: No pertinent imaging studies reviewed.  Other Study Results Review: No additional pertinent studies reviewed.    Last 24 Hours Medication List:     Current Facility-Administered Medications:     acetaminophen (TYLENOL) tablet 650 mg, Q4H PRN    ALPRAZolam (XANAX) tablet 0.25 mg, HS PRN    apixaban (ELIQUIS) tablet 2.5 mg, BID     aspirin (ECOTRIN LOW STRENGTH) EC tablet 81 mg, Daily    bumetanide (BUMEX) injection 4 mg, BID    cefepime (MAXIPIME) 1,000 mg in dextrose 5 % 50 mL IVPB, Q12H, Last Rate: 1,000 mg (12/22/24 0840)    docusate sodium (COLACE) capsule 100 mg, BID    finasteride (PROSCAR) tablet 5 mg, Daily    gabapentin (NEURONTIN) capsule 100 mg, BID    insulin lispro (HumALOG/ADMELOG) 100 units/mL subcutaneous injection 1-5 Units, TID AC **AND** Fingerstick Glucose (POCT), TID AC    insulin lispro (HumALOG/ADMELOG) 100 units/mL subcutaneous injection 1-5 Units, HS    lisinopril (ZESTRIL) tablet 20 mg, Daily    metoprolol succinate (TOPROL-XL) 24 hr tablet 50 mg, Daily    NIFEdipine (PROCARDIA XL) 24 hr tablet 60 mg, Daily    senna (SENOKOT) tablet 8.6 mg, Daily PRN    Administrative Statements   Today, Patient Was Seen By: Deepika Hong DO  I have spent a total time of 40 minutes in caring for this patient on the day of the visit/encounter including Diagnostic results and Prognosis.    **Please Note: This note may have been constructed using a voice recognition system.**

## 2024-12-22 NOTE — ASSESSMENT & PLAN NOTE
Lab Results   Component Value Date    EGFR 23 12/22/2024    EGFR 23 12/21/2024    EGFR 27 12/21/2024    CREATININE 2.37 (H) 12/22/2024    CREATININE 2.42 (H) 12/21/2024    CREATININE 2.11 (H) 12/21/2024     Baseline 1.8-2.2  Continue to monitor closely

## 2024-12-22 NOTE — ASSESSMENT & PLAN NOTE
"Wt Readings from Last 3 Encounters:   12/22/24 98 kg (216 lb 0.8 oz)   12/11/24 96.1 kg (211 lb 13.8 oz)   12/02/24 96.6 kg (213 lb)     82-year-old male with history of CHF recently discharged approximately 1 week ago after acute CHF exacerbation reports worsening shortness of breath and peripheral edema  At home on Bumex twice daily  Recent echo revealed EF of 35% on December 8th  Cardiology appreciated, was started on IV Lasix upon admission but urine output not good enough hence cardiology changed to Bumex 4 mg IV,  Per nephrology added Diuril 500 mg x 1 dose today as well  Still seems to be clinically volume overloaded  He gets episodes where he seems to be in respiratory distress stating\" I cannot breathe\", if you adjust his oxygen he saturates up to 9798% with 4 L nasal cannula, or if nasal cannula gets displaced his saturation does drop to the 80s.  He also seem to have anxiety.  Discussed with son who agrees patient does have anxiety.  CT chest repeated yesterday shows pulmonary edema and possible multifocal pneumonia, given elevated Pro-Humble will also cover with antibiotic  Also speech and swallow eval      "

## 2024-12-22 NOTE — PLAN OF CARE
Recommendations:   Diet: soft/level 3 diet and thin liquids   Meds: whole with puree   Feeding assistance: 1:1 feeding assist   Frequent Oral care: 2-4x/day  Aspiration precautions and compensatory swallowing strategies: upright posture, only feed when fully alert, slow rate of feeding, small bites/sips, and alternating bites and sips  Other Recommendations/ considerations: SLP will continue to follow to ensure adequate management of oral diet and adjust as clinically indicated x1-2

## 2024-12-22 NOTE — ASSESSMENT & PLAN NOTE
Blood pressures currently acceptable and plan to monitor hypertension with lisinopril 20 mg p.o. daily, nifedipine XL 60 mg p.o. daily and Toprol XL 50 mg p.o. daily today.

## 2024-12-22 NOTE — ASSESSMENT & PLAN NOTE
Wt Readings from Last 3 Encounters:   12/22/24 98 kg (216 lb 0.8 oz)   12/11/24 96.1 kg (211 lb 13.8 oz)   12/02/24 96.6 kg (213 lb)   His volume status is overall improving, continue management of diuretics per nephrology  Currently on Bumex 4 mg IV twice daily  Continue Eliquis, aspirin, lisinopril, Toprol, nifedipine  Daily weights, salt restriction  Strict I's and O's  Last echo 12/8/2024 EF 35%, moderate global hypokinesis with regional variation, ungraded diastolic dysfunction, moderate to severe AS, mild to moderate MR, mild TR, mild to moderate pulmonary hypertension with RVSP 46mmhg

## 2024-12-22 NOTE — ASSESSMENT & PLAN NOTE
Upon epic chart review, previously known baseline creatinine was around 1.9-2.1.  Patient was previously followed by myself but was last seen in nephrology office in September 2023 and seems to have lost follow-up since that time.     Patient had underwent echocardiogram on 12/8/2024 and found to have EF of 35%.  Patient is currently receiving Bumex 4 mg IV twice daily and also received Diuril 500 mg IV x 1 dose yesterday.  Patient has voided 1.9 L of urine and still requiring nasal oxygen at 4-5 L/min but told me he is feeling slightly better.  BMP was not done this morning but since patient is still requiring high amount of oxygen, we will plan to continue IV Bumex and plan another dose of IV Diuril today.  Last known creatinine from yesterday is 2.11 with EGFR of 27.  Plan to check renal function with a.m. labs.    Despite of aggressive diuretics, clinically patient has failed to improve much and looking at overall clinical picture, I think palliative care would be appropriate if family is agreeable.

## 2024-12-23 ENCOUNTER — HOME CARE VISIT (OUTPATIENT)
Dept: HOME HOSPICE | Facility: HOSPICE | Age: 89
End: 2024-12-23

## 2024-12-23 ENCOUNTER — HOME CARE VISIT (OUTPATIENT)
Dept: HOME HEALTH SERVICES | Facility: HOME HEALTHCARE | Age: 89
End: 2024-12-23

## 2024-12-23 PROBLEM — Z71.89 GOALS OF CARE, COUNSELING/DISCUSSION: Status: ACTIVE | Noted: 2024-12-23

## 2024-12-23 PROBLEM — I25.10 CORONARY ARTERY DISEASE INVOLVING NATIVE CORONARY ARTERY OF NATIVE HEART WITHOUT ANGINA PECTORIS: Chronic | Status: RESOLVED | Noted: 2017-11-24 | Resolved: 2024-12-23

## 2024-12-23 PROBLEM — Z51.5 PALLIATIVE CARE ENCOUNTER: Status: ACTIVE | Noted: 2024-12-23

## 2024-12-23 LAB
ANION GAP SERPL CALCULATED.3IONS-SCNC: 3 MMOL/L (ref 4–13)
BASOPHILS # BLD AUTO: 0.04 THOUSANDS/ÂΜL (ref 0–0.1)
BASOPHILS NFR BLD AUTO: 1 % (ref 0–1)
BUN SERPL-MCNC: 54 MG/DL (ref 5–25)
CALCIUM SERPL-MCNC: 9.1 MG/DL (ref 8.4–10.2)
CHLORIDE SERPL-SCNC: 91 MMOL/L (ref 96–108)
CO2 SERPL-SCNC: 42 MMOL/L (ref 21–32)
CREAT SERPL-MCNC: 1.91 MG/DL (ref 0.6–1.3)
EOSINOPHIL # BLD AUTO: 0.42 THOUSAND/ÂΜL (ref 0–0.61)
EOSINOPHIL NFR BLD AUTO: 8 % (ref 0–6)
ERYTHROCYTE [DISTWIDTH] IN BLOOD BY AUTOMATED COUNT: 13.9 % (ref 11.6–15.1)
GFR SERPL CREATININE-BSD FRML MDRD: 30 ML/MIN/1.73SQ M
GLUCOSE SERPL-MCNC: 182 MG/DL (ref 65–140)
GLUCOSE SERPL-MCNC: 184 MG/DL (ref 65–140)
GLUCOSE SERPL-MCNC: 231 MG/DL (ref 65–140)
GLUCOSE SERPL-MCNC: 274 MG/DL (ref 65–140)
HCT VFR BLD AUTO: 33.2 % (ref 36.5–49.3)
HGB BLD-MCNC: 10.2 G/DL (ref 12–17)
IMM GRANULOCYTES # BLD AUTO: 0.02 THOUSAND/UL (ref 0–0.2)
IMM GRANULOCYTES NFR BLD AUTO: 0 % (ref 0–2)
LYMPHOCYTES # BLD AUTO: 0.86 THOUSANDS/ÂΜL (ref 0.6–4.47)
LYMPHOCYTES NFR BLD AUTO: 16 % (ref 14–44)
MAGNESIUM SERPL-MCNC: 1.9 MG/DL (ref 1.9–2.7)
MCH RBC QN AUTO: 29.5 PG (ref 26.8–34.3)
MCHC RBC AUTO-ENTMCNC: 30.7 G/DL (ref 31.4–37.4)
MCV RBC AUTO: 96 FL (ref 82–98)
MONOCYTES # BLD AUTO: 0.46 THOUSAND/ÂΜL (ref 0.17–1.22)
MONOCYTES NFR BLD AUTO: 8 % (ref 4–12)
NEUTROPHILS # BLD AUTO: 3.65 THOUSANDS/ÂΜL (ref 1.85–7.62)
NEUTS SEG NFR BLD AUTO: 67 % (ref 43–75)
NRBC BLD AUTO-RTO: 0 /100 WBCS
PLATELET # BLD AUTO: 179 THOUSANDS/UL (ref 149–390)
PMV BLD AUTO: 9.9 FL (ref 8.9–12.7)
POTASSIUM SERPL-SCNC: 3.6 MMOL/L (ref 3.5–5.3)
RBC # BLD AUTO: 3.46 MILLION/UL (ref 3.88–5.62)
SODIUM SERPL-SCNC: 136 MMOL/L (ref 135–147)
WBC # BLD AUTO: 5.45 THOUSAND/UL (ref 4.31–10.16)

## 2024-12-23 PROCEDURE — 83735 ASSAY OF MAGNESIUM: CPT | Performed by: INTERNAL MEDICINE

## 2024-12-23 PROCEDURE — 85025 COMPLETE CBC W/AUTO DIFF WBC: CPT | Performed by: INTERNAL MEDICINE

## 2024-12-23 PROCEDURE — 99232 SBSQ HOSP IP/OBS MODERATE 35: CPT | Performed by: INTERNAL MEDICINE

## 2024-12-23 PROCEDURE — 82948 REAGENT STRIP/BLOOD GLUCOSE: CPT

## 2024-12-23 PROCEDURE — 99233 SBSQ HOSP IP/OBS HIGH 50: CPT | Performed by: FAMILY MEDICINE

## 2024-12-23 PROCEDURE — 99222 1ST HOSP IP/OBS MODERATE 55: CPT | Performed by: NURSE PRACTITIONER

## 2024-12-23 PROCEDURE — 80048 BASIC METABOLIC PNL TOTAL CA: CPT | Performed by: INTERNAL MEDICINE

## 2024-12-23 RX ORDER — GLYCOPYRROLATE 0.2 MG/ML
0.1 INJECTION INTRAMUSCULAR; INTRAVENOUS EVERY 4 HOURS PRN
Status: DISCONTINUED | OUTPATIENT
Start: 2024-12-23 | End: 2024-12-24 | Stop reason: HOSPADM

## 2024-12-23 RX ORDER — BISACODYL 10 MG
10 SUPPOSITORY, RECTAL RECTAL DAILY PRN
Status: DISCONTINUED | OUTPATIENT
Start: 2024-12-23 | End: 2024-12-24 | Stop reason: HOSPADM

## 2024-12-23 RX ORDER — LORAZEPAM 2 MG/ML
1 INJECTION INTRAMUSCULAR
Status: DISCONTINUED | OUTPATIENT
Start: 2024-12-23 | End: 2024-12-24

## 2024-12-23 RX ADMIN — INSULIN LISPRO 1 UNITS: 100 INJECTION, SOLUTION INTRAVENOUS; SUBCUTANEOUS at 08:38

## 2024-12-23 RX ADMIN — CEFEPIME 1000 MG: 2 INJECTION, POWDER, FOR SOLUTION INTRAVENOUS at 20:51

## 2024-12-23 RX ADMIN — CEFEPIME 1000 MG: 2 INJECTION, POWDER, FOR SOLUTION INTRAVENOUS at 08:37

## 2024-12-23 RX ADMIN — ASPIRIN 81 MG: 81 TABLET, COATED ORAL at 08:37

## 2024-12-23 RX ADMIN — LISINOPRIL 20 MG: 20 TABLET ORAL at 08:37

## 2024-12-23 RX ADMIN — INSULIN LISPRO 2 UNITS: 100 INJECTION, SOLUTION INTRAVENOUS; SUBCUTANEOUS at 13:10

## 2024-12-23 RX ADMIN — NIFEDIPINE 60 MG: 30 TABLET, FILM COATED, EXTENDED RELEASE ORAL at 08:37

## 2024-12-23 RX ADMIN — FINASTERIDE 5 MG: 5 TABLET, FILM COATED ORAL at 08:37

## 2024-12-23 RX ADMIN — METOPROLOL SUCCINATE 50 MG: 50 TABLET, EXTENDED RELEASE ORAL at 08:37

## 2024-12-23 RX ADMIN — DOCUSATE SODIUM 100 MG: 100 CAPSULE, LIQUID FILLED ORAL at 08:37

## 2024-12-23 RX ADMIN — GABAPENTIN 100 MG: 100 CAPSULE ORAL at 08:37

## 2024-12-23 RX ADMIN — DOCUSATE SODIUM 100 MG: 100 CAPSULE, LIQUID FILLED ORAL at 17:30

## 2024-12-23 RX ADMIN — BUMETANIDE 4 MG: 0.25 INJECTION INTRAMUSCULAR; INTRAVENOUS at 17:29

## 2024-12-23 RX ADMIN — BUMETANIDE 4 MG: 0.25 INJECTION INTRAMUSCULAR; INTRAVENOUS at 08:37

## 2024-12-23 RX ADMIN — APIXABAN 2.5 MG: 2.5 TABLET, FILM COATED ORAL at 08:37

## 2024-12-23 NOTE — ASSESSMENT & PLAN NOTE
"/72   Pulse 73   Temp 97.7 °F (36.5 °C) (Axillary)   Resp 18   Ht 5' 10\" (1.778 m)   Wt 97.4 kg (214 lb 11.7 oz)   SpO2 100%   BMI 30.81 kg/m²   Currently rate controlled  Continue beta-blocker  Continue Eliquis  "

## 2024-12-23 NOTE — ASSESSMENT & PLAN NOTE
Wt Readings from Last 3 Encounters:   12/23/24 97.4 kg (214 lb 11.7 oz)   12/11/24 96.1 kg (211 lb 13.8 oz)   12/02/24 96.6 kg (213 lb)   Hypervolemic status improving.  I/O net -8.7 L, creatinine trending down.  Diuretics per nephrology given CKD, currently on Bumex 4 mg IV twice daily.  Recommend strict I/O's, daily weights, and sodium restriction.

## 2024-12-23 NOTE — PROGRESS NOTES
Progress Note - Nephrology   Name: Tom Stewart 89 y.o. male I MRN: [id number]  Unit/Bed#: 2 E 271-01 I Date of Admission: 12/18/2024   Date of Service: 12/23/2024 I Hospital Day: 4    Assessment & Plan  Stage 4 chronic kidney disease (HCC)    Kidney function remained stable.  Diuresing very well with help of diuretic which we will continue.  Overall not feeling well    Primary hypertension    Reasonable well-controlled    Type 2 diabetes mellitus with stage 4 chronic kidney disease, without long-term current use of insulin (HCC)  Lab Results   Component Value Date    HGBA1C 8.5 (H) 12/06/2024       Recent Labs     12/22/24  1236 12/22/24  1611 12/22/24 2059 12/23/24  0804   POCGLU 230* 267* 303* 184*     Reasonable control with help of insulin  Blood Sugar Average: Last 72 hrs:  (P) 232.2    Acute on chronic heart failure with preserved ejection fraction (HCC)  Wt Readings from Last 3 Encounters:   12/23/24 97.4 kg (214 lb 11.7 oz)   12/11/24 96.1 kg (211 lb 13.8 oz)   12/02/24 96.6 kg (213 lb)     Diuresing very well with help of diuretic and seems to be asymptomatic and oxygenating well          I have reviewed the nephrology recommendations including diuretic needs, with Slim, and we are in agreement with renal plan including the information outlined above.     Subjective   Brief History of Admission -shortness of breath    Overall feeling well    Still complaining of generalized weakness and shortness of breath oxygenating well    No nausea no vomiting    Urinating well for now    Objective :  Temp:  [96.1 °F (35.6 °C)-98.7 °F (37.1 °C)] 97.7 °F (36.5 °C)  HR:  [63-73] 73  BP: (119-147)/(70-77) 127/72  SpO2:  [98 %-100 %] 100 %  O2 Device: Nasal cannula  Nasal Cannula O2 Flow Rate (L/min):  [2 L/min-5 L/min] 2 L/min    Current Weight: Weight - Scale: 97.4 kg (214 lb 11.7 oz)  First Weight: Weight - Scale: 103 kg (227 lb 1.2 oz)  I/O         12/21 0701 12/22 0700 12/22 0701 12/23 0700 12/23 0701 12/24 0700     P.O. 600 260     Total Intake(mL/kg) 600 (6.1) 260 (2.7)     Urine (mL/kg/hr) 1925 (0.8) 3750 (1.6)     Total Output 1925 3750     Net -1322 -1029                  Physical Exam  Constitutional:       General: He is not in acute distress.     Appearance: He is well-developed.   HENT:      Head: Normocephalic.      Mouth/Throat:      Mouth: Mucous membranes are moist.   Eyes:      General: No scleral icterus.     Conjunctiva/sclera: Conjunctivae normal.   Neck:      Vascular: No JVD.   Cardiovascular:      Rate and Rhythm: Normal rate.      Heart sounds: Normal heart sounds.   Pulmonary:      Effort: Pulmonary effort is normal.      Breath sounds: Wheezing present.   Abdominal:      Palpations: Abdomen is soft.      Tenderness: There is no abdominal tenderness.   Musculoskeletal:         General: Normal range of motion.      Cervical back: Neck supple.   Skin:     General: Skin is warm.      Findings: No rash.   Neurological:      Mental Status: He is alert and oriented to person, place, and time.   Psychiatric:         Behavior: Behavior normal.         Medications:    Current Facility-Administered Medications:     acetaminophen (TYLENOL) tablet 650 mg, 650 mg, Oral, Q4H PRN, Eliud Day MD, 650 mg at 12/20/24 0601    ALPRAZolam (XANAX) tablet 0.25 mg, 0.25 mg, Oral, HS PRN, Eliud Day MD, 0.25 mg at 12/22/24 0230    apixaban (ELIQUIS) tablet 2.5 mg, 2.5 mg, Oral, BID, Eliud Day MD, 2.5 mg at 12/23/24 0837    aspirin (ECOTRIN LOW STRENGTH) EC tablet 81 mg, 81 mg, Oral, Daily, Eliud Day MD, 81 mg at 12/23/24 0837    bumetanide (BUMEX) injection 4 mg, 4 mg, Intravenous, BID, Natacha Garibay PA-C, 4 mg at 12/23/24 0837    cefepime (MAXIPIME) 1,000 mg in dextrose 5 % 50 mL IVPB, 1,000 mg, Intravenous, Q12H, Angie Jiang MD, Last Rate: 100 mL/hr at 12/23/24 0837, 1,000 mg at 12/23/24 0837    docusate sodium (COLACE) capsule 100 mg, 100 mg, Oral, BID, Natacha Garibay PA-C, 100 mg at 12/23/24 0837     finasteride (PROSCAR) tablet 5 mg, 5 mg, Oral, Daily, Eliud Day MD, 5 mg at 12/23/24 0837    gabapentin (NEURONTIN) capsule 100 mg, 100 mg, Oral, BID, Eliud Day MD, 100 mg at 12/23/24 0837    insulin lispro (HumALOG/ADMELOG) 100 units/mL subcutaneous injection 1-5 Units, 1-5 Units, Subcutaneous, TID AC, 1 Units at 12/23/24 0838 **AND** Fingerstick Glucose (POCT), , , TID AC, Eliud Day MD    insulin lispro (HumALOG/ADMELOG) 100 units/mL subcutaneous injection 1-5 Units, 1-5 Units, Subcutaneous, HS, Eliud Day MD, 3 Units at 12/22/24 2141    lisinopril (ZESTRIL) tablet 20 mg, 20 mg, Oral, Daily, Eliud Day MD, 20 mg at 12/23/24 0837    metoprolol succinate (TOPROL-XL) 24 hr tablet 50 mg, 50 mg, Oral, Daily, Eliud Day MD, 50 mg at 12/23/24 0837    NIFEdipine (PROCARDIA XL) 24 hr tablet 60 mg, 60 mg, Oral, Daily, Eliud Day MD, 60 mg at 12/23/24 0837    senna (SENOKOT) tablet 8.6 mg, 1 tablet, Oral, Daily PRN, Natacha Garibay PA-C, 8.6 mg at 12/20/24 0601      Lab Results: I have reviewed the following results:  Results from last 7 days   Lab Units 12/23/24  0825 12/23/24  0544 12/22/24  2044 12/22/24  1019 12/22/24  0513 12/21/24  1932 12/21/24  0547 12/20/24  0517 12/19/24  1736 12/19/24  0508 12/18/24  1749   WBC Thousand/uL  --  5.45  --   --  5.76  --  5.79 6.96  --  7.70 6.70   HEMOGLOBIN g/dL  --  10.2*  --   --  10.1*  --  10.6* 9.6*  --  9.7* 9.6*   HEMATOCRIT %  --  33.2*  --   --  32.5*  --  34.6* 31.2*  --  32.3* 31.0*   PLATELETS Thousands/uL  --  179  --   --  163  --  162 142*  --  143* 147*   POTASSIUM mmol/L 3.6  --  3.7 3.4*  --  3.9 3.9 4.1 4.1 4.0 4.6   CHLORIDE mmol/L 91*  --  87* 89*  --  89* 93* 94* 93* 94* 92*   CO2 mmol/L 42*  --  40* 39*  --  38* 34* 34* 33* 33* 32   BUN mg/dL 54*  --  60* 61*  --  60* 55* 58* 54* 55* 59*   CREATININE mg/dL 1.91*  --  2.32* 2.37*  --  2.42* 2.11* 2.35* 2.11* 2.40* 2.47*   CALCIUM mg/dL 9.1  --  9.4 9.4  --  9.5 9.2 8.9 9.1 8.9 8.8  "  MAGNESIUM mg/dL 1.9  --  2.0 2.0  --  2.1 2.4  --   --  2.2  --    ALBUMIN g/dL  --   --   --   --   --   --   --   --   --   --  3.5       Administrative Statements     Portions of the record may have been created with voice recognition software. Occasional wrong word or \"sound a like\" substitutions may have occurred due to the inherent limitations of voice recognition software. Read the chart carefully and recognize, using context, where substitutions have occurred.If you have any questions, please contact the dictating provider.  "

## 2024-12-23 NOTE — PLAN OF CARE
Problem: Prexisting or High Potential for Compromised Skin Integrity  Goal: Skin integrity is maintained or improved  Description: INTERVENTIONS:  - Identify patients at risk for skin breakdown  - Assess and monitor skin integrity  - Assess and monitor nutrition and hydration status  - Monitor labs   - Assess for incontinence   - Turn and reposition patient  - Assist with mobility/ambulation  - Relieve pressure over bony prominences  - Avoid friction and shearing  - Provide appropriate hygiene as needed including keeping skin clean and dry  - Evaluate need for skin moisturizer/barrier cream  - Collaborate with interdisciplinary team   - Patient/family teaching  - Consider wound care consult   Outcome: Progressing     Problem: PAIN - ADULT  Goal: Verbalizes/displays adequate comfort level or baseline comfort level  Description: Interventions:  - Encourage patient to monitor pain and request assistance  - Assess pain using appropriate pain scale  - Administer analgesics based on type and severity of pain and evaluate response  - Implement non-pharmacological measures as appropriate and evaluate response  - Consider cultural and social influences on pain and pain management  - Notify physician/advanced practitioner if interventions unsuccessful or patient reports new pain  Outcome: Progressing     Problem: INFECTION - ADULT  Goal: Absence or prevention of progression during hospitalization  Description: INTERVENTIONS:  - Assess and monitor for signs and symptoms of infection  - Monitor lab/diagnostic results  - Monitor all insertion sites, i.e. indwelling lines, tubes, and drains  - Monitor endotracheal if appropriate and nasal secretions for changes in amount and color  - Roberts appropriate cooling/warming therapies per order  - Administer medications as ordered  - Instruct and encourage patient and family to use good hand hygiene technique  - Identify and instruct in appropriate isolation precautions for  identified infection/condition  Outcome: Progressing  Goal: Absence of fever/infection during neutropenic period  Description: INTERVENTIONS:  - Monitor WBC    Outcome: Progressing     Problem: SAFETY ADULT  Goal: Patient will remain free of falls  Description: INTERVENTIONS:  - Educate patient/family on patient safety including physical limitations  - Instruct patient to call for assistance with activity   - Consult OT/PT to assist with strengthening/mobility   - Keep Call bell within reach  - Keep bed low and locked with side rails adjusted as appropriate  - Keep care items and personal belongings within reach  - Initiate and maintain comfort rounds  - Make Fall Risk Sign visible to staff  - Offer Toileting every 2 Hours, in advance of need  - Initiate/Maintain bed alarm  - Obtain necessary fall risk management equipment:   - Apply yellow socks and bracelet for high fall risk patients  - Consider moving patient to room near nurses station  Outcome: Progressing  Goal: Maintain or return to baseline ADL function  Description: INTERVENTIONS:  -  Assess patient's ability to carry out ADLs; assess patient's baseline for ADL function and identify physical deficits which impact ability to perform ADLs (bathing, care of mouth/teeth, toileting, grooming, dressing, etc.)  - Assess/evaluate cause of self-care deficits   - Assess range of motion  - Assess patient's mobility; develop plan if impaired  - Assess patient's need for assistive devices and provide as appropriate  - Encourage maximum independence but intervene and supervise when necessary  - Involve family in performance of ADLs  - Assess for home care needs following discharge   - Consider OT consult to assist with ADL evaluation and planning for discharge  - Provide patient education as appropriate  Outcome: Progressing  Goal: Maintains/Returns to pre admission functional level  Description: INTERVENTIONS:  - Perform AM-PAC 6 Click Basic Mobility/ Daily Activity  assessment daily.  - Set and communicate daily mobility goal to care team and patient/family/caregiver.   - Collaborate with rehabilitation services on mobility goals if consulted  - Perform Range of Motion 3 times a day.  - Reposition patient every 2 hours.  - Dangle patient 3 times a day  - Stand patient 3 times a day  - Ambulate patient 3 times a day  - Out of bed to chair 3 times a day   - Out of bed for meals 3 times a day  - Out of bed for toileting  - Record patient progress and toleration of activity level   Outcome: Progressing     Problem: DISCHARGE PLANNING  Goal: Discharge to home or other facility with appropriate resources  Description: INTERVENTIONS:  - Identify barriers to discharge w/patient and caregiver  - Arrange for needed discharge resources and transportation as appropriate  - Identify discharge learning needs (meds, wound care, etc.)  - Arrange for interpretive services to assist at discharge as needed  - Refer to Case Management Department for coordinating discharge planning if the patient needs post-hospital services based on physician/advanced practitioner order or complex needs related to functional status, cognitive ability, or social support system  Outcome: Progressing     Problem: Knowledge Deficit  Goal: Patient/family/caregiver demonstrates understanding of disease process, treatment plan, medications, and discharge instructions  Description: Complete learning assessment and assess knowledge base.  Interventions:  - Provide teaching at level of understanding  - Provide teaching via preferred learning methods  Outcome: Progressing     Problem: Nutrition/Hydration-ADULT  Goal: Nutrient/Hydration intake appropriate for improving, restoring or maintaining nutritional needs  Description: Monitor and assess patient's nutrition/hydration status for malnutrition. Collaborate with interdisciplinary team and initiate plan and interventions as ordered.  Monitor patient's weight and dietary  intake as ordered or per policy. Utilize nutrition screening tool and intervene as necessary. Determine patient's food preferences and provide high-protein, high-caloric foods as appropriate.     INTERVENTIONS:  - Monitor oral intake, urinary output, labs, and treatment plans  - Assess nutrition and hydration status and recommend course of action  - Evaluate amount of meals eaten  - Assist patient with eating if necessary   - Allow adequate time for meals  - Recommend/ encourage appropriate diets, oral nutritional supplements, and vitamin/mineral supplements  - Order, calculate, and assess calorie counts as needed  - Recommend, monitor, and adjust tube feedings and TPN/PPN based on assessed needs  - Assess need for intravenous fluids  - Provide specific nutrition/hydration education as appropriate  - Include patient/family/caregiver in decisions related to nutrition  Outcome: Progressing     Problem: CARDIOVASCULAR - ADULT  Goal: Maintains optimal cardiac output and hemodynamic stability  Description: INTERVENTIONS:  - Monitor I/O, vital signs and rhythm  - Monitor for S/S and trends of decreased cardiac output  - Administer and titrate ordered vasoactive medications to optimize hemodynamic stability  - Assess quality of pulses, skin color and temperature  - Assess for signs of decreased coronary artery perfusion  - Instruct patient to report change in severity of symptoms  Outcome: Progressing  Goal: Absence of cardiac dysrhythmias or at baseline rhythm  Description: INTERVENTIONS:  - Continuous cardiac monitoring, vital signs, obtain 12 lead EKG if ordered  - Administer antiarrhythmic and heart rate control medications as ordered  - Monitor electrolytes and administer replacement therapy as ordered  Outcome: Progressing     Problem: RESPIRATORY - ADULT  Goal: Achieves optimal ventilation and oxygenation  Description: INTERVENTIONS:  - Assess for changes in respiratory status  - Assess for changes in mentation and  behavior  - Position to facilitate oxygenation and minimize respiratory effort  - Oxygen administered by appropriate delivery if ordered  - Initiate smoking cessation education as indicated  - Encourage broncho-pulmonary hygiene including cough, deep breathe, Incentive Spirometry  - Assess the need for suctioning and aspirate as needed  - Assess and instruct to report SOB or any respiratory difficulty  - Respiratory Therapy support as indicated  Outcome: Progressing     Problem: METABOLIC, FLUID AND ELECTROLYTES - ADULT  Goal: Electrolytes maintained within normal limits  Description: INTERVENTIONS:  - Monitor labs and assess patient for signs and symptoms of electrolyte imbalances  - Administer electrolyte replacement as ordered  - Monitor response to electrolyte replacements, including repeat lab results as appropriate  - Instruct patient on fluid and nutrition as appropriate  Outcome: Progressing  Goal: Fluid balance maintained  Description: INTERVENTIONS:  - Monitor labs   - Monitor I/O and WT  - Instruct patient on fluid and nutrition as appropriate  - Assess for signs & symptoms of volume excess or deficit  Outcome: Progressing

## 2024-12-23 NOTE — CONSULTS
Consultation - Palliative and Supportive Care   Tom Stewart 89 y.o. male [id number]    Goals of care, counseling/discussion  Assessment & Plan  Goals:  Level 3 code status  Disease focused care with DNR/DNI limits placed  Concerns introduced today include:  Patient's ongoing decline and goals of care. Spoke to both the patient's son (Rohit)  and daughter (Chanel) regarding hospice vs continued disease focused care.   Both have express wanting to promote comfort.  They do not want to continue disease focused cares.   Agreeable to hospice consult for discharge goal.  Son Rohit identified SNF near his home he would like for the patient.      Decisional apparatus:  Patient does not have capacity on exam today.  If capacity is lost, patient's substitute decision maker would default to daughter Chanel for POA documentation on file.  If Chanel is unavailable ronn Bee is named as alternative.  ER contacts:  Chanel Crockett  Daughter  [phone]  Rohit Stewart  Son  [phone]  Aleja Stewart  Daughter In-Law  [phone]    Advance Directive/Living Will/POLST: Advanced directive and POA documentation on file and reviewed        Palliative care encounter  Assessment & Plan  Palliative diagnosis: HFrEF, moderate-severe AS    Social support:  Supportive listening provided  Normalized experience of patient/family  Provided anxiety containment  Provided anticipatory guidance  Encouraged self care  Discussed spiritual needs  Advocated for patient/family with interdisciplinary care team    Coordination of care:  Reviewed case with     Follow up  Palliative Care will continue to follow and goals of care discussions will be ongoing.   Please reach out via [employer] secure chat if questions or concerns arise.    We appreciate the invitation to be involved in this patient's care. Please do not hesitate to reach our on call provider through our clinic answering service at [phone] should you have acute symptom control  concerns.     * Acute on chronic heart failure with preserved ejection fraction (HCC)  Assessment & Plan  3 admissions since October for heart failure exacerbation, with one additional ED visit for hypoxia and bilateral lower extremity swelling  Most recent echo 12/8/24: LVEF 35%, moderate global hypokinesis, severe abnormality of diastolic function consistent with ungraded restrictive relaxation, moderate to severe AS, mild to moderate pulmonary hypertension  Cardiology consulted        IDENTIFICATION:  Inpatient consult to Palliative Care  Consult performed by: JALEEL Dodson  Consult ordered by: Deepika Hong DO          History of Present Illness:  Tom Stewart is a 89 y.o. male who presented 12/18/2024 with worsening shortness of breath and edema for approximately 5 days as well as approximate 15 pound weight gain since recent discharge.  Pertinent history includes HFrEF, moderate to severe AS, CKD stage IV, Alzheimer's.  Of note, this is patient's third admission for heart failure exacerbation since October, and had 1 additional ED visit for hypoxia and lower extremity edema.  Most recent hospital stays 12/6 to 12/12 and discharged on twice daily Bumex.  Cardiology nephrology consulted this admission, managing diuresis.  Palliative care consulted for goals of care in the setting of minimal improvement despite aggressive diuresis.        Past Medical History:   Diagnosis Date    Acute deep vein thrombosis (DVT) of brachial vein of left upper extremity (HCC) 11/24/2017    Acute deep vein thrombosis (DVT) of left peroneal vein (HCC)     Acute ST elevation myocardial infarction (HCC)     Aortic valve stenosis     Arthritis     Atrial fibrillation (HCC)     Basilar artery stenosis     Basilar artery stenosis     Basilar artery stenosis 05/04/2020    MRA Head wo contrast (12/13/2019)  IMPRESSION:   Multifocal intracranial atherosclerotic disease with moderate to severe stenosis at the origin of the  left M1 segment with additional atherosclerotic disease noted in the distal right M1 and proximal inferior left M2 branches.   Moderate to severe stenosis in the proximal and mid basilar artery with nonvisualization of the right intradural vertebral    Benign prostatic hyperplasia with lower urinary tract symptoms     CAD (coronary artery disease)     CAD in native artery 11/24/2017    Underwent cardiac catheterization on 1/30/2020 and had mid and distal LAD stent placed  Cardiac PCI (1/30/2020)  SUMMARY   CORONARY CIRCULATION: Mid LAD: There was a 90 % stenosis at the site of a prior stent. Distal LAD: There was a 90 % stenosis at the site of a prior stent. Left posterior descending artery: There was a diffuse 50 % stenosis.   1ST LESION INTERVENTIONS: A balloon angioplasty wit    Cerebrovascular small vessel disease 11/12/2020    Chronic kidney disease     Closed fracture of multiple ribs of left side with routine healing 09/03/2020    Coronary artery disease     Dementia (HCC)     Diabetes mellitus (HCC)     DM2 (diabetes mellitus, type 2) ([facility])     Elevated troponin 07/19/2021    Herpes zoster without complication 07/28/2021    History of CVA (cerebrovascular accident) 02/21/2019    History of DVT of peroneal vein left lower extremity 12/16/2019    History of transfusion     HLD (hyperlipidemia)     HTN (hypertension)     Infected sebaceous cyst of skin 06/08/2021    Lump in chest 06/01/2021    Middle cerebral artery stenosis     Mild to moderate aortic stenosis 10/09/2018    ECHO (7/2020)  AORTIC VALVE: Leaflets exhibited moderately increased thickness and moderate calcification. Transaortic velocity was increased due to valvular stenosis. There was mild to moderate stenosis. RICHY 1.4cm, mean pressure gradient 11mmHg, peak velocity 2.15m/s There was mild regurgitation.    Myocardial infarction (HCC)     Near syncope 07/19/2021    Other proteinuria 10/08/2019    Proteinuria     Rib fractures (right) 07/31/2020     Stroke (HCC)     Symptomatic bradycardia     Transient cerebral ischemia     Vitamin D deficiency      Past Surgical History:   Procedure Laterality Date    CARDIAC CATHETERIZATION      Outcome: successful; last assessed: 02/03/2015    CARDIAC CATHETERIZATION  11/26/2019    CORONARY ANGIOPLASTY WITH STENT PLACEMENT  2008    stent to LAD     CORONARY ARTERY BYPASS GRAFT      HAND SURGERY      thumb    HIP HARDWARE REMOVAL      JOINT REPLACEMENT      TOTAL HIP ARTHROPLASTY Right     TOTAL HIP ARTHROPLASTY Bilateral      Social History     Socioeconomic History    Marital status:      Spouse name: Not on file    Number of children: Not on file    Years of education: Not on file    Highest education level: Not on file   Occupational History    Not on file   Tobacco Use    Smoking status: Never     Passive exposure: Never    Smokeless tobacco: Never   Vaping Use    Vaping status: Never Used   Substance and Sexual Activity    Alcohol use: Never    Drug use: Never    Sexual activity: Not Currently     Partners: Female     Birth control/protection: None   Other Topics Concern    Not on file   Social History Narrative    Active advance directive (as per [employer])     No advance directive on file (as per [employer])      Social Drivers of Health     Financial Resource Strain: Low Risk  (1/23/2024)    Overall Financial Resource Strain (CARDIA)     Difficulty of Paying Living Expenses: Not very hard   Food Insecurity: No Food Insecurity (12/19/2024)    Nursing - Inadequate Food Risk Classification     Worried About Running Out of Food in the Last Year: Not on file     Ran Out of Food in the Last Year: Not on file     Ran Out of Food in the Last Year: 1   Transportation Needs: No Transportation Needs (12/19/2024)    Nursing - Transportation Risk Classification     Lack of Transportation: Not on file     Lack of Transportation: 2   Physical Activity: Inactive (5/11/2021)    Exercise Vital Sign     Days of Exercise per  "Week: 0 days     Minutes of Exercise per Session: 0 min   Stress: No Stress Concern Present (2023)    [employer] [place] [employer] - Occupational Stress Questionnaire     Feeling of Stress : Not at all   Social Connections: Unknown (2024)    Received from [employer]     How often do you feel lonely or isolated from those around you? (Adult - for ages 18 years and over): Not on file   Intimate Partner Violence: Unknown (2024)    Nursing IPS     Feels Physically and Emotionally Safe: Not on file     Physically Hurt by Someone: Not on file     Humiliated or Emotionally Abused by Someone: Not on file     Physically Hurt by Someone: 2     Hurt or Threatened by Someone: 2   Housing Stability: Unknown (2024)    Nursing: Inadequate Housing Risk Classification     Has Housing: Not on file     Worried About Losing Housing: Not on file     Unable to Get Utilities: Not on file     Unable to Pay for Housing in the Last Year: 2     Has Housin     Family History   Problem Relation Age of Onset    Emphysema Father     Emphysema Sister        Medications:  all current active meds have been reviewed    Allergies   Allergen Reactions    Celecoxib Other (See Comments) and Hives     Per pt does NOT remember type of reaction or severity level    Hydromorphone Other (See Comments) and Hives     Per pt does NOT remember type of reaction or severity level    Pravastatin Hives    Statins Other (See Comments) and Hives     Per pt does NOT remember type of reaction or severity level       Objective:  /61 (BP Location: Left arm)   Pulse 81   Temp 98.2 °F (36.8 °C) (Oral)   Resp 20   Ht 5' 10\" (1.778 m)   Wt 97.4 kg (214 lb 11.7 oz)   SpO2 92%   BMI 30.81 kg/m²     Physical Exam  Vitals and nursing note reviewed.   Constitutional:       General: He is not in acute distress.     Appearance: He is ill-appearing.      Interventions: Nasal cannula in place.   HENT:      Head: " "Normocephalic and atraumatic.   Eyes:      Conjunctiva/sclera: Conjunctivae normal.   Cardiovascular:      Rate and Rhythm: Normal rate. Rhythm irregular.   Pulmonary:      Effort: Pulmonary effort is normal. No respiratory distress.   Abdominal:      Palpations: Abdomen is soft.      Tenderness: There is no abdominal tenderness.   Musculoskeletal:         General: No swelling.      Cervical back: Neck supple.   Skin:     General: Skin is warm and dry.      Capillary Refill: Capillary refill takes less than 2 seconds.   Neurological:      General: No focal deficit present.      Mental Status: He is alert. He is disoriented and confused.   Psychiatric:         Attention and Perception: He is inattentive.         Speech: Speech normal.         Behavior: Behavior is cooperative.         Cognition and Memory: Cognition is impaired. Memory is impaired.           Lab Results: I have personally reviewed pertinent labs.  Imaging Studies: I have personally reviewed pertinent reports.  EKG, Pathology, and Other Studies: I have personally reviewed pertinent reports.    Counseling / Coordination of Care  Total floor / unit time spent today 60+0+ minutes. Greater than 50% of total time was spent with the patient and / or family counseling and / or coordination of care. A description of the counseling / coordination of care: Reviewed chart, provided medical updates, determined goals of care, discussed palliative care and symptom management, discussed comfort care and hospice care, discussed code status, provided anticipatory guidance, determined competency and POA/HCA, determined social/family support, provided psychosocial support. Reviewed with DUDLEY and KENYATTA.        Portions of this document may have been created using dictation software and as such some \"sound alike\" terms may have been generated by the system. Do not hesitate to contact me with any questions or clarifications.   "

## 2024-12-23 NOTE — CASE MANAGEMENT
Case Management Progress Note    Patient name Tom Stewart  Location 2 EAST 271/2 E 271-01 MRN [id number]  : 1935 Date 2024       LOS (days): 4  Geometric Mean LOS (GMLOS) (days): 3.9  Days to GMLOS:-0.1        OBJECTIVE:        Current admission status: Inpatient  Preferred Pharmacy:   [id number] - [place], [place] - [address]  [facility] [address]  Phone: [phone] Fax: [phone]    [facility] - [place], [place] - [address]  [address]  [facility] [address]  [facility] [address]  Phone: [phone] Fax: [phone]    Primary Care Provider: Beto Garnett DO    Primary Insurance: MEDICARE  Secondary Insurance: [employer]    PROGRESS NOTE:    Per rounding with SLIM, palliative care has been consulted. Ongoing GOC. Discharge is TBD at this time. CM also received an Epic Secure Chat from the  stating that family is requesting a phone call today from CM. This CM agreeable to inform the assigned CM (Jane) of the same. CM department will continue to follow patient through discharge.

## 2024-12-23 NOTE — PLAN OF CARE
Problem: Prexisting or High Potential for Compromised Skin Integrity  Goal: Skin integrity is maintained or improved  Description: INTERVENTIONS:  - Identify patients at risk for skin breakdown  - Assess and monitor skin integrity  - Assess and monitor nutrition and hydration status  - Monitor labs   - Assess for incontinence   - Turn and reposition patient  - Assist with mobility/ambulation  - Relieve pressure over bony prominences  - Avoid friction and shearing  - Provide appropriate hygiene as needed including keeping skin clean and dry  - Evaluate need for skin moisturizer/barrier cream  - Collaborate with interdisciplinary team   - Patient/family teaching  - Consider wound care consult   Outcome: Progressing     Problem: PAIN - ADULT  Goal: Verbalizes/displays adequate comfort level or baseline comfort level  Description: Interventions:  - Encourage patient to monitor pain and request assistance  - Assess pain using appropriate pain scale  - Administer analgesics based on type and severity of pain and evaluate response  - Implement non-pharmacological measures as appropriate and evaluate response  - Consider cultural and social influences on pain and pain management  - Notify physician/advanced practitioner if interventions unsuccessful or patient reports new pain  Outcome: Progressing     Problem: INFECTION - ADULT  Goal: Absence or prevention of progression during hospitalization  Description: INTERVENTIONS:  - Assess and monitor for signs and symptoms of infection  - Monitor lab/diagnostic results  - Monitor all insertion sites, i.e. indwelling lines, tubes, and drains  - Monitor endotracheal if appropriate and nasal secretions for changes in amount and color  - Connelly appropriate cooling/warming therapies per order  - Administer medications as ordered  - Instruct and encourage patient and family to use good hand hygiene technique  - Identify and instruct in appropriate isolation precautions for  identified infection/condition  Outcome: Progressing  Goal: Absence of fever/infection during neutropenic period  Description: INTERVENTIONS:  - Monitor WBC    Outcome: Progressing     Problem: SAFETY ADULT  Goal: Patient will remain free of falls  Description: INTERVENTIONS:  - Educate patient/family on patient safety including physical limitations  - Instruct patient to call for assistance with activity   - Consult OT/PT to assist with strengthening/mobility   - Keep Call bell within reach  - Keep bed low and locked with side rails adjusted as appropriate  - Keep care items and personal belongings within reach  - Initiate and maintain comfort rounds  - Make Fall Risk Sign visible to staff  - Offer Toileting every  Hours, in advance of need  - Initiate/Maintain alarm  - Obtain necessary fall risk management equipment:   - Apply yellow socks and bracelet for high fall risk patients  - Consider moving patient to room near nurses station  Outcome: Progressing  Goal: Maintain or return to baseline ADL function  Description: INTERVENTIONS:  -  Assess patient's ability to carry out ADLs; assess patient's baseline for ADL function and identify physical deficits which impact ability to perform ADLs (bathing, care of mouth/teeth, toileting, grooming, dressing, etc.)  - Assess/evaluate cause of self-care deficits   - Assess range of motion  - Assess patient's mobility; develop plan if impaired  - Assess patient's need for assistive devices and provide as appropriate  - Encourage maximum independence but intervene and supervise when necessary  - Involve family in performance of ADLs  - Assess for home care needs following discharge   - Consider OT consult to assist with ADL evaluation and planning for discharge  - Provide patient education as appropriate  Outcome: Progressing  Goal: Maintains/Returns to pre admission functional level  Description: INTERVENTIONS:  - Perform AM-PAC 6 Click Basic Mobility/ Daily Activity  assessment daily.  - Set and communicate daily mobility goal to care team and patient/family/caregiver.   - Collaborate with rehabilitation services on mobility goals if consulted  - Perform Range of Motion  times a day.  - Reposition patient every  hours.  - Dangle patient  times a day  - Stand patient  times a day  - Ambulate patient  times a day  - Out of bed to chair  times a day   - Out of bed for meals times a day  - Out of bed for toileting  - Record patient progress and toleration of activity level   Outcome: Progressing     Problem: DISCHARGE PLANNING  Goal: Discharge to home or other facility with appropriate resources  Description: INTERVENTIONS:  - Identify barriers to discharge w/patient and caregiver  - Arrange for needed discharge resources and transportation as appropriate  - Identify discharge learning needs (meds, wound care, etc.)  - Arrange for interpretive services to assist at discharge as needed  - Refer to Case Management Department for coordinating discharge planning if the patient needs post-hospital services based on physician/advanced practitioner order or complex needs related to functional status, cognitive ability, or social support system  Outcome: Progressing     Problem: Knowledge Deficit  Goal: Patient/family/caregiver demonstrates understanding of disease process, treatment plan, medications, and discharge instructions  Description: Complete learning assessment and assess knowledge base.  Interventions:  - Provide teaching at level of understanding  - Provide teaching via preferred learning methods  Outcome: Progressing     Problem: Nutrition/Hydration-ADULT  Goal: Nutrient/Hydration intake appropriate for improving, restoring or maintaining nutritional needs  Description: Monitor and assess patient's nutrition/hydration status for malnutrition. Collaborate with interdisciplinary team and initiate plan and interventions as ordered.  Monitor patient's weight and dietary intake as  ordered or per policy. Utilize nutrition screening tool and intervene as necessary. Determine patient's food preferences and provide high-protein, high-caloric foods as appropriate.     INTERVENTIONS:  - Monitor oral intake, urinary output, labs, and treatment plans  - Assess nutrition and hydration status and recommend course of action  - Evaluate amount of meals eaten  - Assist patient with eating if necessary   - Allow adequate time for meals  - Recommend/ encourage appropriate diets, oral nutritional supplements, and vitamin/mineral supplements  - Order, calculate, and assess calorie counts as needed  - Recommend, monitor, and adjust tube feedings and TPN/PPN based on assessed needs  - Assess need for intravenous fluids  - Provide specific nutrition/hydration education as appropriate  - Include patient/family/caregiver in decisions related to nutrition  Outcome: Progressing     Problem: NEUROSENSORY - ADULT  Goal: Achieves stable or improved neurological status  Description: INTERVENTIONS  - Monitor and report changes in neurological status  - Monitor vital signs such as temperature, blood pressure, glucose, and any other labs ordered   - Initiate measures to prevent increased intracranial pressure  - Monitor for seizure activity and implement precautions if appropriate      Outcome: Progressing  Goal: Remains free of injury related to seizures activity  Description: INTERVENTIONS  - Maintain airway, patient safety  and administer oxygen as ordered  - Monitor patient for seizure activity, document and report duration and description of seizure to physician/advanced practitioner  - If seizure occurs,  ensure patient safety during seizure  - Reorient patient post seizure  - Seizure pads on all 4 side rails  - Instruct patient/family to notify RN of any seizure activity including if an aura is experienced  - Instruct patient/family to call for assistance with activity based on nursing assessment  - Administer  anti-seizure medications if ordered    Outcome: Progressing  Goal: Achieves maximal functionality and self care  Description: INTERVENTIONS  - Monitor swallowing and airway patency with patient fatigue and changes in neurological status  - Encourage and assist patient to increase activity and self care.   - Encourage visually impaired, hearing impaired and aphasic patients to use assistive/communication devices  Outcome: Progressing     Problem: CARDIOVASCULAR - ADULT  Goal: Maintains optimal cardiac output and hemodynamic stability  Description: INTERVENTIONS:  - Monitor I/O, vital signs and rhythm  - Monitor for S/S and trends of decreased cardiac output  - Administer and titrate ordered vasoactive medications to optimize hemodynamic stability  - Assess quality of pulses, skin color and temperature  - Assess for signs of decreased coronary artery perfusion  - Instruct patient to report change in severity of symptoms  Outcome: Progressing  Goal: Absence of cardiac dysrhythmias or at baseline rhythm  Description: INTERVENTIONS:  - Continuous cardiac monitoring, vital signs, obtain 12 lead EKG if ordered  - Administer antiarrhythmic and heart rate control medications as ordered  - Monitor electrolytes and administer replacement therapy as ordered  Outcome: Progressing     Problem: RESPIRATORY - ADULT  Goal: Achieves optimal ventilation and oxygenation  Description: INTERVENTIONS:  - Assess for changes in respiratory status  - Assess for changes in mentation and behavior  - Position to facilitate oxygenation and minimize respiratory effort  - Oxygen administered by appropriate delivery if ordered  - Initiate smoking cessation education as indicated  - Encourage broncho-pulmonary hygiene including cough, deep breathe, Incentive Spirometry  - Assess the need for suctioning and aspirate as needed  - Assess and instruct to report SOB or any respiratory difficulty  - Respiratory Therapy support as indicated  Outcome:  Progressing     Problem: METABOLIC, FLUID AND ELECTROLYTES - ADULT  Goal: Electrolytes maintained within normal limits  Description: INTERVENTIONS:  - Monitor labs and assess patient for signs and symptoms of electrolyte imbalances  - Administer electrolyte replacement as ordered  - Monitor response to electrolyte replacements, including repeat lab results as appropriate  - Instruct patient on fluid and nutrition as appropriate  Outcome: Progressing  Goal: Fluid balance maintained  Description: INTERVENTIONS:  - Monitor labs   - Monitor I/O and WT  - Instruct patient on fluid and nutrition as appropriate  - Assess for signs & symptoms of volume excess or deficit  Outcome: Progressing

## 2024-12-23 NOTE — CASE MANAGEMENT
Case Management Discharge Planning Note    Patient name Tom Stewart  Location [address]- MRN [id number]  : 1935 Date 2024       Current Admission Date: 2024  Current Admission Diagnosis:Acute on chronic heart failure with preserved ejection fraction (HCC)   Patient Active Problem List    Diagnosis Date Noted Date Diagnosed    Palliative care encounter 2024     Goals of care, counseling/discussion 2024     Ulcer of left lower extremity, limited to breakdown of skin (HCC) 2024     CAD s/p PCI to LAD 2024     Encounter for delirium elderly at risk (DEAR) screening 10/28/2024     Frailty syndrome in geriatric patient 10/28/2024     Moderate aortic stenosis 10/28/2024     Acute respiratory failure with hypoxia (HCC) 10/25/2024     Sebaceous cyst 2024     Nose ulceration 2024     Primary hypertension 2023     Acute on chronic heart failure with preserved ejection fraction (HCC) 2023     Stage 4 chronic kidney disease (HCC) 2023     Gait instability 2022     Hearing loss 2022     Other Alzheimer's disease (HCC) 2022     Other proteinuria 2021     Vitamin D deficiency 2021     Ambulatory dysfunction 2021     Fall 2020     Type 2 diabetes mellitus with stage 4 chronic kidney disease, without long-term current use of insulin (HCC) 2019     Paroxysmal atrial fibrillation (HCC) 2019     History of CVA 2019     Mixed hyperlipidemia 2017       LOS (days): 4  Geometric Mean LOS (GMLOS) (days): 3.9  Days to GMLOS:-0.3     OBJECTIVE:  Risk of Unplanned Readmission Score: 24.51         Current admission status: Inpatient   Preferred Pharmacy:   [id number] - [place] - [address] [facility]  [place] [address]  Phone: [phone] Fax: [phone]    [facility] - [place], [place] - [address]  [address]  [facility]  [address]  [facility] [address]  Phone: [phone] Fax: [phone]    Primary Care Provider: Beto Garnett DO    Primary Insurance: MEDICARE  Secondary Insurance: [employer]    DISCHARGE DETAILS:    Discharge planning discussed with:: patient and patient's son at bedside and patient;s daughter over the phone  Freedom of Choice: Yes  Comments - Freedom of Choice: CM maintained freedom of choice as it pertains to discharge planning. Patient's son and daughter report preferences for pt to be discharged to [facility] at discharge and for patient to go on hospice there, gave no preference as to hospice agency. CM followed up and advised [facility] plans to be able to offer a pt a bed tomorrow and they will transition pt to hospice there which patient's son was agreeable to. CM advised of need for BLS transport and advised of potential for incurring a bill which patient's son verbalized understanding and agreeament of.  CM contacted family/caregiver?: Yes  Were Treatment Team discharge recommendations reviewed with patient/caregiver?: Yes  Did patient/caregiver verbalize understanding of patient care needs?: Yes  Were patient/caregiver advised of the risks associated with not following Treatment Team discharge recommendations?: Yes    Contacts  Patient Contacts: Rohit (Son)  [phone]  Relationship to Patient:: Family  Contact Method: In Person  Reason/Outcome: Continuity of Care, Emergency Contact, Discharge Planning    Requested Home Health Care         Is the patient interested in HHC at discharge?: No    DME Referral Provided  Referral made for DME?: No    Other Referral/Resources/Interventions Provided:  Interventions: Short Term Rehab  Referral Comments: Called [facility] [place] and spoke to Teresa from admissions who requested that clinicals be faxed to her at [email or web address]. CM emailed clinicals. KENYATTA then received phone call back from Teresa advising that they can  plan to accept pt tomorrow and will accept pt on comfort care and then will transition to hospice at their facility. SHe requests for CM to follow up w her in the AM regarding transport time.    Would you like to participate in our [facility] service program?  : No - Declined    Treatment Team Recommendation: Short Term Rehab  Discharge Destination Plan:: Short Term Rehab  Transport at Discharge : BLS Ambulance

## 2024-12-23 NOTE — ASSESSMENT & PLAN NOTE
Wt Readings from Last 3 Encounters:   12/23/24 97.4 kg (214 lb 11.7 oz)   12/11/24 96.1 kg (211 lb 13.8 oz)   12/02/24 96.6 kg (213 lb)     Diuresing very well with help of diuretic and seems to be asymptomatic and oxygenating well

## 2024-12-23 NOTE — ASSESSMENT & PLAN NOTE
HR is well-controlled, continue Toprol-XL.  [id number] at least 7, continue Eliquis 2.5 mg bid (age, creatinine).

## 2024-12-23 NOTE — ASSESSMENT & PLAN NOTE
Goals:  Level 3 code status  Disease focused care with DNR/DNI limits placed  Concerns introduced today include:  Patient's ongoing decline and goals of care. Spoke to both the patient's son (Rohit)  and daughter (Chanel) regarding hospice vs continued disease focused care.   Both have express wanting to promote comfort.  They do not want to continue disease focused cares.   Agreeable to hospice consult for discharge goal.  Son Rohit identified SNF near his home he would like for the patient.      Decisional apparatus:  Patient does not have capacity on exam today.  If capacity is lost, patient's substitute decision maker would default to daughter Chanel for POA documentation on file.  If Chanel is unavailable ronn Bee is named as alternative.  ER contacts:  Chanel Crockett  Daughter  [phone]  Rohit Stewart  Son  [phone]  Aleja Stewart  Daughter In-Law  [phone]    Advance Directive/Living Will/POLST: Advanced directive and POA documentation on file and reviewed

## 2024-12-23 NOTE — ASSESSMENT & PLAN NOTE
Cardiac catheterization in 1/2020 revealed 90% ISR of mLAD and dLAD.  Patient received PCI with balloon angioplasty and CONCEPCION to each of the lesions.     Continue ASA and Toprol-XL.  Not on statin due to history of intolerance (hives).

## 2024-12-23 NOTE — ASSESSMENT & PLAN NOTE
Lab Results   Component Value Date    HGBA1C 8.5 (H) 12/06/2024       Recent Labs     12/22/24  1236 12/22/24  1611 12/22/24 2059 12/23/24  0804   POCGLU 230* 267* 303* 184*     Reasonable control with help of insulin  Blood Sugar Average: Last 72 hrs:  (P) 232.2

## 2024-12-23 NOTE — ASSESSMENT & PLAN NOTE
"Wt Readings from Last 3 Encounters:   12/23/24 97.4 kg (214 lb 11.7 oz)   12/11/24 96.1 kg (211 lb 13.8 oz)   12/02/24 96.6 kg (213 lb)     82-year-old male with history of CHF recently discharged approximately 1 week ago after acute CHF exacerbation reports worsening shortness of breath and peripheral edema  Recent echo revealed EF of 35% on December 8th  Currently on IV Bumex 4 mg twice daily, I's and O's -7.9 L net negative  Status post Diuril per nephrology on 12/22/2024  Clinically volume overloaded  He gets episodes where he seems to be in respiratory distress stating\" I cannot breathe\", if you adjust his oxygen he saturates up to 97-98% with 4 L nasal cannula, or if nasal cannula gets displaced his saturation does drop to the 80s.  He also seem to have anxiety.  Discussed with son who agrees patient does have anxiety.  Repeat CT chest showed pulmonary edema and possible multifocal pneumonia, given elevated Pro-Humble will also cover with antibiotic  speech and swallow eval  Cardiology and nephrology following  Considering poor response to diuretics and overall prognosis being poor, palliative care has been consulted.  Goals of care discussions have been initiated with the family- chose Hospice at [facility] (reading )      "

## 2024-12-23 NOTE — ASSESSMENT & PLAN NOTE
Lab Results   Component Value Date    EGFR 30 12/23/2024    EGFR 24 12/22/2024    EGFR 23 12/22/2024    CREATININE 1.91 (H) 12/23/2024    CREATININE 2.32 (H) 12/22/2024    CREATININE 2.37 (H) 12/22/2024   Nephrology recommendations appreciated.

## 2024-12-23 NOTE — ASSESSMENT & PLAN NOTE
Kidney function remained stable.  Diuresing very well with help of diuretic which we will continue.  Overall not feeling well

## 2024-12-23 NOTE — PROGRESS NOTES
Cardiology Progress Note - Tom Stewart 89 y.o. male MRN: [id number]    Unit/Bed#: 2 E 271-01 Encounter: [id number]      Assessment & Plan  Acute on chronic heart failure with preserved ejection fraction (HCC)  Wt Readings from Last 3 Encounters:   12/23/24 97.4 kg (214 lb 11.7 oz)   12/11/24 96.1 kg (211 lb 13.8 oz)   12/02/24 96.6 kg (213 lb)   Hypervolemic status improving.  I/O net -8.7 L, creatinine trending down.  Diuretics per nephrology given CKD, currently on Bumex 4 mg IV twice daily.  Recommend strict I/O's, daily weights, and sodium restriction.  CAD s/p PCI to LAD  Cardiac catheterization in 1/2020 revealed 90% ISR of mLAD and dLAD.  Patient received PCI with balloon angioplasty and CONCEPCION to each of the lesions.     Continue ASA and Toprol-XL.  Not on statin due to history of intolerance (hives).  Paroxysmal atrial fibrillation (HCC)  HR is well-controlled, continue Toprol-XL.  [id number] at least 7, continue Eliquis 2.5 mg bid (age, creatinine).  Moderate aortic stenosis  Mod-severe aortic stenosis per recent TTE.  Patient/family previously opted for conservative management given advanced age in setting of multiple comorbidities including dementia.  Primary hypertension  Continue Toprol-XL, lisinopril, nifedipine, and Bumex.  Mixed hyperlipidemia  Not on statin due to history of allergy (hives), continue dietary control.  Type 2 diabetes mellitus with stage 4 chronic kidney disease, without long-term current use of insulin (HCC)  Lab Results   Component Value Date    HGBA1C 8.5 (H) 12/06/2024   Management per primary service.  Stage 4 chronic kidney disease (HCC)  Lab Results   Component Value Date    EGFR 30 12/23/2024    EGFR 24 12/22/2024    EGFR 23 12/22/2024    CREATININE 1.91 (H) 12/23/2024    CREATININE 2.32 (H) 12/22/2024    CREATININE 2.37 (H) 12/22/2024   Nephrology recommendations appreciated.  Other Alzheimer's disease (HCC)  Management per SLIM  History of CVA  On ASA and  "Eliquis.        Subjective:   Patient seen and examined.  No significant events overnight.  Patient resting in bed without any new complaints at this time.  All questions were answered.    Objective:     Vitals: Blood pressure 161/71, pulse 95, temperature 97.7 °F (36.5 °C), temperature source Axillary, resp. rate 20, height 5' 10\" (1.778 m), weight 97.4 kg (214 lb 11.7 oz), SpO2 90%., Body mass index is 30.81 kg/m².,   Orthostatic Blood Pressures      Flowsheet Row Most Recent Value   Blood Pressure 161/71 filed at 12/23/2024 1500   Patient Position - Orthostatic VS Lying filed at 12/23/2024 1500              Intake/Output Summary (Last 24 hours) at 12/23/2024 1605  Last data filed at 12/23/2024 1301  Gross per 24 hour   Intake --   Output 3625 ml   Net -3625 ml         Physical Exam:  GEN: Alert and in no acute distress.  Ill appearing and well nourished.   HEENT: Sclera anicteric, conjunctivae pink, mucous membranes moist. Oropharynx clear.   NECK: Supple, no carotid bruits, no significant JVD. Trachea midline, no thyromegaly.   HEART: Irregularly irregular rhythm, normal S1 and S2, 2/6 LINDSAY, no clicks, gallops or rubs. PMI nondisplaced, no thrills.   LUNGS: Decreased breath sounds bilaterally; no wheezes, rales, or rhonchi. No increased work of breathing or signs of respiratory distress.   ABDOMEN: Soft, nontender, nondistended, normoactive bowel sounds.   EXTREMITIES: Skin warm and well perfused, no clubbing or cyanosis, + BL LE edema.  NEURO: No focal findings. Normal speech. Mood and affect normal.   SKIN: Normal without suspicious lesions on exposed skin.        Medications:      Current Facility-Administered Medications:     acetaminophen (TYLENOL) tablet 650 mg, 650 mg, Oral, Q4H PRN, Eliud Day MD, 650 mg at 12/20/24 0601    ALPRAZolam (XANAX) tablet 0.25 mg, 0.25 mg, Oral, HS PRN, Eliud Day MD, 0.25 mg at 12/22/24 0230    apixaban (ELIQUIS) tablet 2.5 mg, 2.5 mg, Oral, BID, Eliud Day MD, 2.5 mg " at 12/23/24 0837    aspirin (ECOTRIN LOW STRENGTH) EC tablet 81 mg, 81 mg, Oral, Daily, Eliud Day MD, 81 mg at 12/23/24 0837    bumetanide (BUMEX) injection 4 mg, 4 mg, Intravenous, BID, Natacha Garibay PA-C, 4 mg at 12/23/24 0837    cefepime (MAXIPIME) 1,000 mg in dextrose 5 % 50 mL IVPB, 1,000 mg, Intravenous, Q12H, Angie Jiang MD, Last Rate: 100 mL/hr at 12/23/24 0837, 1,000 mg at 12/23/24 0837    docusate sodium (COLACE) capsule 100 mg, 100 mg, Oral, BID, Natacha Garibay PA-C, 100 mg at 12/23/24 0837    finasteride (PROSCAR) tablet 5 mg, 5 mg, Oral, Daily, Eliud Day MD, 5 mg at 12/23/24 0837    gabapentin (NEURONTIN) capsule 100 mg, 100 mg, Oral, BID, Eliud Day MD, 100 mg at 12/23/24 0837    insulin lispro (HumALOG/ADMELOG) 100 units/mL subcutaneous injection 1-5 Units, 1-5 Units, Subcutaneous, TID AC, 2 Units at 12/23/24 1310 **AND** Fingerstick Glucose (POCT), , , TID AC, Eliud Day MD    insulin lispro (HumALOG/ADMELOG) 100 units/mL subcutaneous injection 1-5 Units, 1-5 Units, Subcutaneous, HS, Eliud Day MD, 3 Units at 12/22/24 2141    lisinopril (ZESTRIL) tablet 20 mg, 20 mg, Oral, Daily, Eliud Day MD, 20 mg at 12/23/24 0837    metoprolol succinate (TOPROL-XL) 24 hr tablet 50 mg, 50 mg, Oral, Daily, Eliud Day MD, 50 mg at 12/23/24 0837    NIFEdipine (PROCARDIA XL) 24 hr tablet 60 mg, 60 mg, Oral, Daily, Eliud Day MD, 60 mg at 12/23/24 0837    senna (SENOKOT) tablet 8.6 mg, 1 tablet, Oral, Daily PRN, Natacha Garibay PA-C, 8.6 mg at 12/20/24 0601     Labs & Results:     Results from last 7 days   Lab Units 12/18/24  2222 12/18/24  1932 12/18/24  1749   HS TNI 0HR ng/L  --   --  95*   HS TNI 2HR ng/L  --  93*  --    HSTNI D2 ng/L  --  -2  --    HS TNI 4HR ng/L 93*  --   --    HSTNI D4 ng/L -2  --   --      Results from last 7 days   Lab Units 12/23/24  0544 12/22/24  0513 12/21/24  0547   WBC Thousand/uL 5.45 5.76 5.79   HEMOGLOBIN g/dL 10.2* 10.1* 10.6*   HEMATOCRIT %  "33.2* 32.5* 34.6*   PLATELETS Thousands/uL 179 163 162         Results from last 7 days   Lab Units 24  0824  1019 24  0508 24  1749   POTASSIUM mmol/L 3.6 3.7 3.4*   < > 4.6   CHLORIDE mmol/L 91* 87* 89*   < > 92*   CO2 mmol/L 42* 40* 39*   < > 32   BUN mg/dL 54* 60* 61*   < > 59*   CREATININE mg/dL 1.91* 2.32* 2.37*   < > 2.47*   CALCIUM mg/dL 9.1 9.4 9.4   < > 8.8   ALK PHOS U/L  --   --   --   --  58   ALT U/L  --   --   --   --  16   AST U/L  --   --   --   --  14    < > = values in this interval not displayed.         Results from last 7 days   Lab Units 24  1019   MAGNESIUM mg/dL 1.9 2.0 2.0       Vitals: Blood pressure 161/71, pulse 95, temperature 97.7 °F (36.5 °C), temperature source Axillary, resp. rate 20, height 5' 10\" (1.778 m), weight 97.4 kg (214 lb 11.7 oz), SpO2 90%., Body mass index is 30.81 kg/m².,   Orthostatic Blood Pressures      Flowsheet Row Most Recent Value   Blood Pressure 161/71 filed at 2024 1500   Patient Position - Orthostatic VS Lying filed at 2024 1500            Systolic (24hrs), Av , Min:119 , Max:161     Diastolic (24hrs), Av, Min:71, Max:75        Intake/Output Summary (Last 24 hours) at 2024 1605  Last data filed at 2024 1301  Gross per 24 hour   Intake --   Output 3625 ml   Net -3625 ml       Invasive Devices       Peripheral Intravenous Line  Duration             Peripheral IV 24 Dorsal (posterior);Right Hand 2 days              Drain  Duration             External Urinary Catheter 3 days                        BP Readings from Last 3 Encounters:   24 161/71   24 151/72   24 120/70      Wt Readings from Last 3 Encounters:   24 97.4 kg (214 lb 11.7 oz)   24 96.1 kg (211 lb 13.8 oz)   24 96.6 kg (213 lb)                    "

## 2024-12-23 NOTE — ASSESSMENT & PLAN NOTE
Baseline creatinine approximately 1.8-2.2, creatinine today around 1.9 (2.32)  Presented with a creatinine of 2.4  Nephrology on board, previously discussed with family members if creatinine continues to get worse on aggressive IV diuresis instead of hemodialysis but will possibly choose hospice/palliative route, palliative care consulted. Appreciate recommendations.

## 2024-12-23 NOTE — PROGRESS NOTES
"Progress Note - Hospitalist   Name: Tom Stewart 89 y.o. male I MRN: [id number]  Unit/Bed#: 2 E 271-01 I Date of Admission: 12/18/2024   Date of Service: 12/23/2024 I Hospital Day: 4    Assessment & Plan  Acute on chronic heart failure with preserved ejection fraction (HCC)  Wt Readings from Last 3 Encounters:   12/23/24 97.4 kg (214 lb 11.7 oz)   12/11/24 96.1 kg (211 lb 13.8 oz)   12/02/24 96.6 kg (213 lb)     82-year-old male with history of CHF recently discharged approximately 1 week ago after acute CHF exacerbation reports worsening shortness of breath and peripheral edema  Recent echo revealed EF of 35% on December 8th  Currently on IV Bumex 4 mg twice daily, I's and O's -7.9 L net negative  Status post Diuril per nephrology on 12/22/2024  Clinically volume overloaded  He gets episodes where he seems to be in respiratory distress stating\" I cannot breathe\", if you adjust his oxygen he saturates up to 97-98% with 4 L nasal cannula, or if nasal cannula gets displaced his saturation does drop to the 80s.  He also seem to have anxiety.  Discussed with son who agrees patient does have anxiety.  Repeat CT chest showed pulmonary edema and possible multifocal pneumonia, given elevated Pro-Humble will also cover with antibiotic  speech and swallow eval  Cardiology and nephrology following  Considering poor response to diuretics and overall prognosis being poor, palliative care has been consulted.  Goals of care discussions have been initiated with the family- chose [facility]ospice at SNF (reading )      Paroxysmal atrial fibrillation (HCC)  /72   Pulse 73   Temp 97.7 °F (36.5 °C) (Axillary)   Resp 18   Ht 5' 10\" (1.778 m)   Wt 97.4 kg (214 lb 11.7 oz)   SpO2 100%   BMI 30.81 kg/m²   Currently rate controlled  Continue beta-blocker  Continue Eliquis  Type 2 diabetes mellitus with stage 4 chronic kidney disease, without long-term current use of insulin (HCC)  Hold p.o. glipizide  Continue sliding scale " insulin  Monitor BG  Stage 4 chronic kidney disease (HCC)  Baseline creatinine approximately 1.8-2.2, creatinine today around 1.9 (2.32)  Presented with a creatinine of 2.4  Nephrology on board, previously discussed with family members if creatinine continues to get worse on aggressive IV diuresis instead of hemodialysis but will possibly choose hospice/palliative route, palliative care consulted. Appreciate recommendations.   Primary hypertension  BP currently controlled  Continue nifedipine, lisinopril, metoprolol  Other Alzheimer's disease (HCC)  Currently at baseline mental status  Continue delirium precautions  Moderate aortic stenosis  Monitor closely  Cardiology following  Ulcer of left lower extremity, limited to breakdown of skin (HCC)  Patient with bilateral lower extremity chronic wounds  Lower extremity Doppler to rule out DVT: Negative for DVT  Wound care consult, appreciate input  Nutrition consult  Discussed with RN, change dressing, upload picture in media, heel offload    VTE Pharmacologic Prophylaxis: VTE Score: 7 High Risk (Score >/= 5) - Pharmacological DVT Prophylaxis Ordered: apixaban (Eliquis). Sequential Compression Devices Ordered.    Mobility:   Basic Mobility Inpatient Raw Score: 8  JH-HLM Goal: 3: Sit at edge of bed  JH-HLM Achieved: 4: Move to chair/commode  JH-HLM Goal achieved. Continue to encourage appropriate mobility.    Patient Centered Rounds: I performed bedside rounds with nursing staff today.   Discussions with Specialists or Other Care Team Provider: cardiology, nephrology and palliative    Education and Discussions with Family / Patient: Updated  (daughter) at bedside.    Current Length of Stay: 4 day(s)  Current Patient Status: Inpatient   Certification Statement: The patient will continue to require additional inpatient hospital stay due to ongoing GOC and IV diuretics  Discharge Plan:  once SNF hospice is set up    Code Status: Level 3 - DNAR and  DNI    Subjective   Pt seen and examined at bedside during am rounds  Pt did not verbalize with me  Appeared mildly sob   Pts daughter at bedside       Objective :  Temp:  [96.1 °F (35.6 °C)-98.7 °F (37.1 °C)] 97.7 °F (36.5 °C)  HR:  [63-73] 73  BP: (119-147)/(70-77) 127/72  SpO2:  [98 %-100 %] 100 %  O2 Device: Nasal cannula  Nasal Cannula O2 Flow Rate (L/min):  [2 L/min-5 L/min] 2 L/min    Body mass index is 30.81 kg/m².     Input and Output Summary (last 24 hours):     Intake/Output Summary (Last 24 hours) at 12/23/2024 0933  Last data filed at 12/23/2024 0500  Gross per 24 hour   Intake --   Output 3750 ml   Net -3750 ml       Physical Exam  S1,2 (+) R  Lungs diminished   Leg edema noted, bandages on for drainage  Not opening eyes on my exam  Not answering questions  Seems to be moaning softly  No abd distention     Lines/Drains:  Lines/Drains/Airways       Active Status       Name Placement date Placement time Site Days    External Urinary Catheter 12/20/24  0000  -- 3                            Lab Results: I have reviewed the following results:   Results from last 7 days   Lab Units 12/23/24  0544   WBC Thousand/uL 5.45   HEMOGLOBIN g/dL 10.2*   HEMATOCRIT % 33.2*   PLATELETS Thousands/uL 179   SEGS PCT % 67   LYMPHO PCT % 16   MONO PCT % 8   EOS PCT % 8*     Results from last 7 days   Lab Units 12/23/24  0825 12/19/24  0508 12/18/24  1749   SODIUM mmol/L 136   < > 131*   POTASSIUM mmol/L 3.6   < > 4.6   CHLORIDE mmol/L 91*   < > 92*   CO2 mmol/L 42*   < > 32   BUN mg/dL 54*   < > 59*   CREATININE mg/dL 1.91*   < > 2.47*   ANION GAP mmol/L 3*   < > 7   CALCIUM mg/dL 9.1   < > 8.8   ALBUMIN g/dL  --   --  3.5   TOTAL BILIRUBIN mg/dL  --   --  0.67   ALK PHOS U/L  --   --  58   ALT U/L  --   --  16   AST U/L  --   --  14   GLUCOSE RANDOM mg/dL 182*   < > 333*    < > = values in this interval not displayed.         Results from last 7 days   Lab Units 12/23/24  0804 12/22/24  2059 12/22/24  1611 12/22/24  1236  12/22/24  0813 12/22/24  0525 12/21/24  2056 12/21/24  1928 12/21/24  1650 12/21/24  1204 12/21/24  0729 12/20/24  2131   POC GLUCOSE mg/dl 184* 303* 267* 230* 172* 240* 295* 308* 334* 239* 161* 180*         Results from last 7 days   Lab Units 12/21/24  0547   PROCALCITONIN ng/ml 0.14       Recent Cultures (last 7 days):         Imaging Results Review: I reviewed radiology reports from this admission including: CT chest.  Other Study Results Review: No additional pertinent studies reviewed.    Last 24 Hours Medication List:     Current Facility-Administered Medications:     acetaminophen (TYLENOL) tablet 650 mg, Q4H PRN    ALPRAZolam (XANAX) tablet 0.25 mg, HS PRN    apixaban (ELIQUIS) tablet 2.5 mg, BID    aspirin (ECOTRIN LOW STRENGTH) EC tablet 81 mg, Daily    bumetanide (BUMEX) injection 4 mg, BID    cefepime (MAXIPIME) 1,000 mg in dextrose 5 % 50 mL IVPB, Q12H, Last Rate: 1,000 mg (12/23/24 0837)    docusate sodium (COLACE) capsule 100 mg, BID    finasteride (PROSCAR) tablet 5 mg, Daily    gabapentin (NEURONTIN) capsule 100 mg, BID    insulin lispro (HumALOG/ADMELOG) 100 units/mL subcutaneous injection 1-5 Units, TID AC **AND** Fingerstick Glucose (POCT), TID AC    insulin lispro (HumALOG/ADMELOG) 100 units/mL subcutaneous injection 1-5 Units, HS    lisinopril (ZESTRIL) tablet 20 mg, Daily    metoprolol succinate (TOPROL-XL) 24 hr tablet 50 mg, Daily    NIFEdipine (PROCARDIA XL) 24 hr tablet 60 mg, Daily    senna (SENOKOT) tablet 8.6 mg, Daily PRN    Administrative Statements   Today, Patient Was Seen By: Kevyn Sandoval MD  I have spent a total time of 50 minutes in caring for this patient on the day of the visit/encounter including Diagnostic results, Prognosis, Risks and benefits of tx options, Instructions for management, Patient and family education, Importance of tx compliance, Risk factor reductions, Impressions, Counseling / Coordination of care, Documenting in the medical record, Reviewing / ordering  tests, medicine, procedures  , Obtaining or reviewing history  , and Communicating with other healthcare professionals .    **Please Note: This note may have been constructed using a voice recognition system.**

## 2024-12-23 NOTE — ASSESSMENT & PLAN NOTE
Palliative diagnosis: HFrEF, moderate-severe AS    Social support:  Supportive listening provided  Normalized experience of patient/family  Provided anxiety containment  Provided anticipatory guidance  Encouraged self care  Discussed spiritual needs  Advocated for patient/family with interdisciplinary care team    Coordination of care:  Reviewed case with     Follow up  Palliative Care will continue to follow and goals of care discussions will be ongoing.   Please reach out via [employer] secure chat if questions or concerns arise.    We appreciate the invitation to be involved in this patient's care. Please do not hesitate to reach our on call provider through our clinic answering service at [phone] should you have acute symptom control concerns.

## 2024-12-23 NOTE — HOSPICE NOTE
T/C to sister, Chanel. She stated that the family's goal is to move her father directed from [place], to a facility located in [place]. I advised her that we don't cover [place]. IPCM and Facility SW aware.

## 2024-12-23 NOTE — ASSESSMENT & PLAN NOTE
3 admissions since October for heart failure exacerbation, with one additional ED visit for hypoxia and bilateral lower extremity swelling  Most recent echo 12/8/24: LVEF 35%, moderate global hypokinesis, severe abnormality of diastolic function consistent with ungraded restrictive relaxation, moderate to severe AS, mild to moderate pulmonary hypertension  Cardiology consulted

## 2024-12-23 NOTE — ASSESSMENT & PLAN NOTE
Patient with bilateral lower extremity chronic wounds  Lower extremity Doppler to rule out DVT: Negative for DVT  Wound care consult, appreciate input  Nutrition consult  Discussed with RN, change dressing, upload picture in media, heel offload

## 2024-12-23 NOTE — ASSESSMENT & PLAN NOTE
Mod-severe aortic stenosis per recent TTE.  Patient/family previously opted for conservative management given advanced age in setting of multiple comorbidities including dementia.

## 2024-12-23 NOTE — TREATMENT PLAN
In depth discussion with patient's son Rohit and patient's daughter Barrie  They both have chosen comfort care.  They understand what comfort care entails and they understand that all life-sustaining medications will be held at this time like antibiotics and diuretics etc. no further blood work  They are in agreement with continuing comfort care after this discussion  Orders placed  Palliative care team updated  Discussion witnessed: JALEEL Palacios

## 2024-12-24 ENCOUNTER — TRANSITIONAL CARE MANAGEMENT (OUTPATIENT)
Age: 89
End: 2024-12-24

## 2024-12-24 VITALS
DIASTOLIC BLOOD PRESSURE: 61 MMHG | WEIGHT: 214.73 LBS | OXYGEN SATURATION: 90 % | BODY MASS INDEX: 30.74 KG/M2 | RESPIRATION RATE: 20 BRPM | TEMPERATURE: 98.2 F | HEART RATE: 81 BPM | SYSTOLIC BLOOD PRESSURE: 124 MMHG | HEIGHT: 70 IN

## 2024-12-24 PROCEDURE — 99232 SBSQ HOSP IP/OBS MODERATE 35: CPT | Performed by: INTERNAL MEDICINE

## 2024-12-24 PROCEDURE — 99239 HOSP IP/OBS DSCHRG MGMT >30: CPT | Performed by: FAMILY MEDICINE

## 2024-12-24 RX ORDER — LORAZEPAM 2 MG/ML
1 INJECTION INTRAMUSCULAR
Status: DISCONTINUED | OUTPATIENT
Start: 2024-12-24 | End: 2024-12-24 | Stop reason: HOSPADM

## 2024-12-24 RX ORDER — LORAZEPAM 2 MG/ML
1 CONCENTRATE ORAL EVERY 4 HOURS PRN
Qty: 30 ML | Refills: 0 | Status: SHIPPED | OUTPATIENT
Start: 2024-12-24 | End: 2025-01-03

## 2024-12-24 RX ORDER — HALOPERIDOL 2 MG/ML
1 SOLUTION ORAL EVERY 6 HOURS PRN
Qty: 30 ML | Refills: 0 | Status: SHIPPED | OUTPATIENT
Start: 2024-12-24 | End: 2025-01-08

## 2024-12-24 RX ADMIN — LORAZEPAM 1 MG: 2 INJECTION INTRAMUSCULAR; INTRAVENOUS at 05:53

## 2024-12-24 RX ADMIN — MORPHINE SULFATE 2 MG: 2 INJECTION, SOLUTION INTRAMUSCULAR; INTRAVENOUS at 10:21

## 2024-12-24 RX ADMIN — MORPHINE SULFATE 2 MG: 2 INJECTION, SOLUTION INTRAMUSCULAR; INTRAVENOUS at 01:35

## 2024-12-24 RX ADMIN — MORPHINE SULFATE 2 MG: 2 INJECTION, SOLUTION INTRAMUSCULAR; INTRAVENOUS at 01:40

## 2024-12-24 NOTE — ASSESSMENT & PLAN NOTE
Wt Readings from Last 3 Encounters:   12/23/24 97.4 kg (214 lb 11.7 oz)   12/11/24 96.1 kg (211 lb 13.8 oz)   12/02/24 96.6 kg (213 lb)   Hypervolemic status improving.  I/O net -9.4 L, creatinine trending down.  Diuretics per nephrology given CKD, currently on Bumex 4 mg IV twice daily.  Recommend strict I/O's, daily weights, and sodium restriction.

## 2024-12-24 NOTE — DISCHARGE SUMMARY
"Discharge Summary - Hospitalist   Name: Tom Stewart 89 y.o. male I MRN: [id number]  Unit/Bed#: 2 E 271-01 I Date of Admission: 12/18/2024   Date of Service: 12/24/2024 I Hospital Day: 5     Assessment & Plan  Acute on chronic heart failure with preserved ejection fraction (HCC)  Wt Readings from Last 3 Encounters:   12/23/24 97.4 kg (214 lb 11.7 oz)   12/11/24 96.1 kg (211 lb 13.8 oz)   12/02/24 96.6 kg (213 lb)     82-year-old male with history of CHF recently discharged approximately 1 week ago after acute CHF exacerbation reports worsening shortness of breath and peripheral edema  Recent echo revealed EF of 35% on December 8th  Currently on IV Bumex 4 mg twice daily, I's and O's -7.9 L net negative  Status post Diuril per nephrology on 12/22/2024  Clinically volume overloaded  He gets episodes where he seems to be in respiratory distress stating\" I cannot breathe\", if you adjust his oxygen he saturates up to 97-98% with 4 L nasal cannula, or if nasal cannula gets displaced his saturation does drop to the 80s.  He also seem to have anxiety.  Discussed with son who agrees patient does have anxiety.  Repeat CT chest showed pulmonary edema and possible multifocal pneumonia, given elevated Pro-Humble will also cover with antibiotic  speech and swallow eval  Cardiology and nephrology following  Considering poor response to diuretics and overall prognosis being poor, palliative care has been consulted.  Goals of care discussions have been initiated with the family- chose [facility] at [facility] (reading )      Paroxysmal atrial fibrillation (HCC)  /61 (BP Location: Left arm)   Pulse 81   Temp 98.2 °F (36.8 °C) (Oral)   Resp 20   Ht 5' 10\" (1.778 m)   Wt 97.4 kg (214 lb 11.7 oz)   SpO2 90%   BMI 30.81 kg/m²   Currently rate controlled  Continue beta-blocker  Continue Eliquis  Type 2 diabetes mellitus with stage 4 chronic kidney disease, without long-term current use of insulin (HCC)  Hold p.o. glipizide  Continue " "sliding scale insulin  Monitor BG  Stage 4 chronic kidney disease (HCC)  Baseline creatinine approximately 1.8-2.2, creatinine today around 1.9 (2.32)  Presented with a creatinine of 2.4  Nephrology on board, previously discussed with family members if creatinine continues to get worse on aggressive IV diuresis instead of hemodialysis but will possibly choose hospice/palliative route, palliative care consulted. Appreciate recommendations.   Primary hypertension  BP currently controlled  Continue nifedipine, lisinopril, metoprolol  Other Alzheimer's disease (HCC)  Currently at baseline mental status  Continue delirium precautions  Moderate aortic stenosis  Monitor closely  Cardiology following  Ulcer of left lower extremity, limited to breakdown of skin (HCC)  Patient with bilateral lower extremity chronic wounds  Lower extremity Doppler to rule out DVT: Negative for DVT  Wound care consult, appreciate input  Nutrition consult  Discussed with RN, change dressing, upload picture in media, heel offload     Medical Problems       Resolved Problems  Date Reviewed: 12/2/2024   None       Discharging Physician / Practitioner: Kevyn Sandoval MD  PCP: Beto Garnett DO  Admission Date:   Admission Orders (From admission, onward)       Ordered        12/19/24 1308  INPATIENT ADMISSION  Once            12/19/24 1257  Place in Observation  Once                          Discharge Date: 12/24/24    Consultations During Hospital Stay:  IP CONSULT TO CARDIOLOGY  IP CONSULT TO NEPHROLOGY  IP CONSULT TO NUTRITION SERVICES  IP CONSULT TO PALLIATIVE CARE'      Procedures Performed:   none    Significant Findings / Test Results:   As above    Incidental Findings:   none     Test Results Pending at Discharge (will require follow up):   N/a     Outpatient Tests Requested:  N/a    Reason for Admission: \"Tom Stewart is a 89 y.o. male with past medical history significant diabetes, CKD, hypertension, heart failure with reduced ejection " "fraction initially presented with worsening shortness of breath and peripheral edema.  Reports worsening symptoms for the past 5 days.  Reports approximately 15 pound weight gain since discharge.  Reports worsening shortness of breath with exertion.  Otherwise denies any acute complaints.  Denies chest pain, fevers, chills, abdominal pain, nausea, vomiting, diarrhea or any other complaints\"    Hospital Course:   Tmo Stewart is a 89 y.o. male patient who originally presented to the hospital on 12/18/2024 due to acute on chronic heart failure with preserved ejection fraction.  At home patient is on Bumex twice daily.Patient was followed by cardiology and nephrology.  For volume overload with ejection fraction 35% patient was started on Bumex 4 mg IV twice daily.  Patient has diuresed but is not adequate compared to the dose of diuretics that patient has received.  No  urinary retention urinary retention on bladder scan.  Patient then received additional Diuril 500 mg IV one-time dose to post urine output on top of IV Bumex.  Patient also had acute respiratory failure and required continued supplemental oxygen.  Considering patient's poor prognosis, goals of care discussions were had with the patient and patient's family.  Patient's daughter and son are the primary decision makers where medical POA is patient's daughter.  Palliative was consulted.  Family chose hospice care as the prognosis was explained to them and they endorsed understanding.  Patient is  accepted at short-term rehab for hospice       Please see above list of diagnoses and related plan for additional information.     Condition at Discharge: guarded    Discharge Day Visit / Exam:   Subjective:  \"no new symptoms\"  Vitals: Blood Pressure: 124/61 (12/23/24 2251)  Pulse: 81 (12/23/24 2251)  Temperature: 98.2 °F (36.8 °C) (12/23/24 2251)  Temp Source: Oral (12/23/24 2251)  Respirations: 20 (12/23/24 2251)  Height: 5' 10\" (177.8 cm) (12/19/24 " 3534)  Weight - Scale: 97.4 kg (214 lb 11.7 oz) (12/23/24 0296)  SpO2: 90 % (12/24/24 0757)  Physical Exam s1,2 (+)   Lungs diminished bases with some crackles  Pedal edema noted   Legs under dressing  Pt not very verbal, says yes or no and keeps eyes closed  Abd nt nd bs (+) 4 quads    Discussion with Family: Updated  (daughter) at bedside.    Discharge instructions/Information to patient and family:   See after visit summary for information provided to patient and family.      Provisions for Follow-Up Care:  See after visit summary for information related to follow-up care and any pertinent home health orders.      Mobility at time of Discharge:   Basic Mobility Inpatient Raw Score: 8  JH-HLM Goal: 3: Sit at edge of bed  JH-HLM Achieved: 1: Laying in bed  HLM Goal NOT achieved. Continue to encourage mobility in post discharge setting.     Disposition:   Other: SNF HOSPICE    Planned Readmission: no    Discharge Medications:  See after visit summary for reconciled discharge medications provided to patient and/or family.      Administrative Statements   Discharge Statement:  I have spent a total time of 75 minutes in caring for this patient on the day of the visit/encounter. >30 minutes of time was spent on: Diagnostic results, Prognosis, Risks and benefits of tx options, Instructions for management, Patient and family education, Importance of tx compliance, Risk factor reductions, Impressions, Counseling / Coordination of care, Documenting in the medical record, Reviewing / ordering tests, medicine, procedures  , and Communicating with other healthcare professionals .    **Please Note: This note may have been constructed using a voice recognition system**

## 2024-12-24 NOTE — ASSESSMENT & PLAN NOTE
HR is well-controlled, continue Toprol-XL.  [id number] at least 7, continue Eliquis 2.5 mg bid (age, creatinine).

## 2024-12-24 NOTE — CASE MANAGEMENT
Case Management Discharge Planning Note    Patient name Tom Stewart  Location 2 [address] 271- MRN [id number]  : 1935 Date 2024       Current Admission Date: 2024  Current Admission Diagnosis:Acute on chronic heart failure with preserved ejection fraction (HCC)   Patient Active Problem List    Diagnosis Date Noted Date Diagnosed    Palliative care encounter 2024     Goals of care, counseling/discussion 2024     Ulcer of left lower extremity, limited to breakdown of skin (HCC) 2024     CAD s/p PCI to LAD 2024     Encounter for delirium elderly at risk (DEAR) screening 10/28/2024     Frailty syndrome in geriatric patient 10/28/2024     Moderate aortic stenosis 10/28/2024     Acute respiratory failure with hypoxia (HCC) 10/25/2024     Sebaceous cyst 2024     Nose ulceration 2024     Primary hypertension 2023     Acute on chronic heart failure with preserved ejection fraction (HCC) 2023     Stage 4 chronic kidney disease (HCC) 2023     Gait instability 2022     Hearing loss 2022     Other Alzheimer's disease (HCC) 2022     Other proteinuria 2021     Vitamin D deficiency 2021     Ambulatory dysfunction 2021     Fall 2020     Type 2 diabetes mellitus with stage 4 chronic kidney disease, without long-term current use of insulin (HCC) 2019     Paroxysmal atrial fibrillation (HCC) 2019     History of CVA 2019     Mixed hyperlipidemia 2017       LOS (days): 5  Geometric Mean LOS (GMLOS) (days): 3.9  Days to GMLOS:-1     OBJECTIVE:  Risk of Unplanned Readmission Score: 23.88         Current admission status: Inpatient   Preferred Pharmacy:   [id number] - [place] - [address]  [address]  [place] [address]  Phone: [phone] Fax: [phone]    [facility] - [place], [place] - [address]  [address]  [facility]  [address]  [facility] [address]  Phone: [phone] Fax: [phone]    Primary Care Provider: Beto Garnett DO    Primary Insurance: MEDICARE  Secondary Insurance: [employer]    DISCHARGE DETAILS:    Discharge planning discussed with:: patient's son over the phone  Freedom of Choice: Yes  Comments - Freedom of Choice: CM maintained freedom of choice as it pertains to discharge planning. CM advised that [facility] can accept pt today, requesting early in the day discharge and that 10:30 BLS  is confirmed. Patient's son was agreeable to discharge to [facility] today with 10:30am  time.  CM contacted family/caregiver?: Yes  Were Treatment Team discharge recommendations reviewed with patient/caregiver?: Yes  Did patient/caregiver verbalize understanding of patient care needs?: N/A- going to facility  Were patient/caregiver advised of the risks associated with not following Treatment Team discharge recommendations?: Yes    Contacts  Patient Contacts: Rohit Alexis)  [phone]  Relationship to Patient:: Family  Contact Method: Phone  Phone Number: Rohit Alexis)  [phone]  Reason/Outcome: Continuity of Care, Emergency Contact, Discharge Planning    Requested Home Health Care         Is the patient interested in HHC at discharge?: No    DME Referral Provided  Referral made for DME?: No    Other Referral/Resources/Interventions Provided:  Interventions: Short Term Rehab  Referral Comments: Emailed Teresa [email or web address]. She advised of being agreeable to accepting patient today on comfort care and requested early in the day transport. CM advised of 10:30am confimred  time.    Would you like to participate in our [facility] service program?  : No - Declined    Treatment Team Recommendation: Short Term Rehab  Discharge Destination Plan:: Short Term Rehab  Transport at Discharge : BLS Ambulance     Number/Name of Dispatcher:  Roundtrip  Transported by (Company and Unit #): [facility]  ETA of Transport (Date): 12/24/24  ETA of Transport (Time): 1030              IMM Given (Date):: 12/24/24  IMM Given to:: Patient  Family notified:: CM reviewed IMM with patient at bedside who verbalized understanding of rights and provided verbal acknowledgement of IMM. IMM returned to medical records.

## 2024-12-24 NOTE — ASSESSMENT & PLAN NOTE
"/61 (BP Location: Left arm)   Pulse 81   Temp 98.2 °F (36.8 °C) (Oral)   Resp 20   Ht 5' 10\" (1.778 m)   Wt 97.4 kg (214 lb 11.7 oz)   SpO2 90%   BMI 30.81 kg/m²   Currently rate controlled  Continue beta-blocker  Continue Eliquis  "

## 2024-12-24 NOTE — PLAN OF CARE
Problem: Prexisting or High Potential for Compromised Skin Integrity  Goal: Skin integrity is maintained or improved  Description: INTERVENTIONS:  - Identify patients at risk for skin breakdown  - Assess and monitor skin integrity  - Assess and monitor nutrition and hydration status  - Monitor labs   - Assess for incontinence   - Turn and reposition patient  - Assist with mobility/ambulation  - Relieve pressure over bony prominences  - Avoid friction and shearing  - Provide appropriate hygiene as needed including keeping skin clean and dry  - Evaluate need for skin moisturizer/barrier cream  - Collaborate with interdisciplinary team   - Patient/family teaching  - Consider wound care consult   Outcome: Progressing     Problem: PAIN - ADULT  Goal: Verbalizes/displays adequate comfort level or baseline comfort level  Description: Interventions:  - Encourage patient to monitor pain and request assistance  - Assess pain using appropriate pain scale  - Administer analgesics based on type and severity of pain and evaluate response  - Implement non-pharmacological measures as appropriate and evaluate response  - Consider cultural and social influences on pain and pain management  - Notify physician/advanced practitioner if interventions unsuccessful or patient reports new pain  Outcome: Progressing     Problem: INFECTION - ADULT  Goal: Absence or prevention of progression during hospitalization  Description: INTERVENTIONS:  - Assess and monitor for signs and symptoms of infection  - Monitor lab/diagnostic results  - Monitor all insertion sites, i.e. indwelling lines, tubes, and drains  - Monitor endotracheal if appropriate and nasal secretions for changes in amount and color  - [place] appropriate cooling/warming therapies per order  - Administer medications as ordered  - Instruct and encourage patient and family to use good hand hygiene technique  - Identify and instruct in appropriate isolation precautions for  identified infection/condition  Outcome: Progressing  Goal: Absence of fever/infection during neutropenic period  Description: INTERVENTIONS:  - Monitor WBC    Outcome: Progressing     Problem: SAFETY ADULT  Goal: Patient will remain free of falls  Description: INTERVENTIONS:  - Educate patient/family on patient safety including physical limitations  - Instruct patient to call for assistance with activity   - Consult OT/PT to assist with strengthening/mobility   - Keep Call bell within reach  - Keep bed low and locked with side rails adjusted as appropriate  - Keep care items and personal belongings within reach  - Initiate and maintain comfort rounds  - Make Fall Risk Sign visible to staff  - Offer Toileting every  Hours, in advance of need  - Initiate/Maintain alarm  - Obtain necessary fall risk management equipment:   - Apply yellow socks and bracelet for high fall risk patients  - Consider moving patient to room near nurses station  Outcome: Progressing  Goal: Maintain or return to baseline ADL function  Description: INTERVENTIONS:  -  Assess patient's ability to carry out ADLs; assess patient's baseline for ADL function and identify physical deficits which impact ability to perform ADLs (bathing, care of mouth/teeth, toileting, grooming, dressing, etc.)  - Assess/evaluate cause of self-care deficits   - Assess range of motion  - Assess patient's mobility; develop plan if impaired  - Assess patient's need for assistive devices and provide as appropriate  - Encourage maximum independence but intervene and supervise when necessary  - Involve family in performance of ADLs  - Assess for home care needs following discharge   - Consider OT consult to assist with ADL evaluation and planning for discharge  - Provide patient education as appropriate  Outcome: Progressing  Goal: Maintains/Returns to pre admission functional level  Description: INTERVENTIONS:  - Perform AM-PAC 6 Click Basic Mobility/ Daily Activity  assessment daily.  - Set and communicate daily mobility goal to care team and patient/family/caregiver.   - Collaborate with rehabilitation services on mobility goals if consulted  - Perform Range of Motion  times a day.  - Reposition patient every  hours.  - Dangle patient  times a day  - Stand patient  times a day  - Ambulate patient  times a day  - Out of bed to chair  times a day   - Out of bed for meals  times a day  - Out of bed for toileting  - Record patient progress and toleration of activity level   Outcome: Progressing     Problem: DISCHARGE PLANNING  Goal: Discharge to home or other facility with appropriate resources  Description: INTERVENTIONS:  - Identify barriers to discharge w/patient and caregiver  - Arrange for needed discharge resources and transportation as appropriate  - Identify discharge learning needs (meds, wound care, etc.)  - Arrange for interpretive services to assist at discharge as needed  - Refer to Case Management Department for coordinating discharge planning if the patient needs post-hospital services based on physician/advanced practitioner order or complex needs related to functional status, cognitive ability, or social support system  Outcome: Progressing     Problem: Knowledge Deficit  Goal: Patient/family/caregiver demonstrates understanding of disease process, treatment plan, medications, and discharge instructions  Description: Complete learning assessment and assess knowledge base.  Interventions:  - Provide teaching at level of understanding  - Provide teaching via preferred learning methods  Outcome: Progressing     Problem: Nutrition/Hydration-ADULT  Goal: Nutrient/Hydration intake appropriate for improving, restoring or maintaining nutritional needs  Description: Monitor and assess patient's nutrition/hydration status for malnutrition. Collaborate with interdisciplinary team and initiate plan and interventions as ordered.  Monitor patient's weight and dietary intake as  ordered or per policy. Utilize nutrition screening tool and intervene as necessary. Determine patient's food preferences and provide high-protein, high-caloric foods as appropriate.     INTERVENTIONS:  - Monitor oral intake, urinary output, labs, and treatment plans  - Assess nutrition and hydration status and recommend course of action  - Evaluate amount of meals eaten  - Assist patient with eating if necessary   - Allow adequate time for meals  - Recommend/ encourage appropriate diets, oral nutritional supplements, and vitamin/mineral supplements  - Order, calculate, and assess calorie counts as needed  - Recommend, monitor, and adjust tube feedings and TPN/PPN based on assessed needs  - Assess need for intravenous fluids  - Provide specific nutrition/hydration education as appropriate  - Include patient/family/caregiver in decisions related to nutrition  Outcome: Progressing     Problem: NEUROSENSORY - ADULT  Goal: Achieves stable or improved neurological status  Description: INTERVENTIONS  - Monitor and report changes in neurological status  - Monitor vital signs such as temperature, blood pressure, glucose, and any other labs ordered   - Initiate measures to prevent increased intracranial pressure  - Monitor for seizure activity and implement precautions if appropriate      Outcome: Progressing  Goal: Remains free of injury related to seizures activity  Description: INTERVENTIONS  - Maintain airway, patient safety  and administer oxygen as ordered  - Monitor patient for seizure activity, document and report duration and description of seizure to physician/advanced practitioner  - If seizure occurs,  ensure patient safety during seizure  - Reorient patient post seizure  - Seizure pads on all 4 side rails  - Instruct patient/family to notify RN of any seizure activity including if an aura is experienced  - Instruct patient/family to call for assistance with activity based on nursing assessment  - Administer  anti-seizure medications if ordered    Outcome: Progressing  Goal: Achieves maximal functionality and self care  Description: INTERVENTIONS  - Monitor swallowing and airway patency with patient fatigue and changes in neurological status  - Encourage and assist patient to increase activity and self care.   - Encourage visually impaired, hearing impaired and aphasic patients to use assistive/communication devices  Outcome: Progressing     Problem: CARDIOVASCULAR - ADULT  Goal: Maintains optimal cardiac output and hemodynamic stability  Description: INTERVENTIONS:  - Monitor I/O, vital signs and rhythm  - Monitor for S/S and trends of decreased cardiac output  - Administer and titrate ordered vasoactive medications to optimize hemodynamic stability  - Assess quality of pulses, skin color and temperature  - Assess for signs of decreased coronary artery perfusion  - Instruct patient to report change in severity of symptoms  Outcome: Progressing  Goal: Absence of cardiac dysrhythmias or at baseline rhythm  Description: INTERVENTIONS:  - Continuous cardiac monitoring, vital signs, obtain 12 lead EKG if ordered  - Administer antiarrhythmic and heart rate control medications as ordered  - Monitor electrolytes and administer replacement therapy as ordered  Outcome: Progressing     Problem: RESPIRATORY - ADULT  Goal: Achieves optimal ventilation and oxygenation  Description: INTERVENTIONS:  - Assess for changes in respiratory status  - Assess for changes in mentation and behavior  - Position to facilitate oxygenation and minimize respiratory effort  - Oxygen administered by appropriate delivery if ordered  - Initiate smoking cessation education as indicated  - Encourage broncho-pulmonary hygiene including cough, deep breathe, Incentive Spirometry  - Assess the need for suctioning and aspirate as needed  - Assess and instruct to report SOB or any respiratory difficulty  - Respiratory Therapy support as indicated  Outcome:  Progressing     Problem: METABOLIC, FLUID AND ELECTROLYTES - ADULT  Goal: Electrolytes maintained within normal limits  Description: INTERVENTIONS:  - Monitor labs and assess patient for signs and symptoms of electrolyte imbalances  - Administer electrolyte replacement as ordered  - Monitor response to electrolyte replacements, including repeat lab results as appropriate  - Instruct patient on fluid and nutrition as appropriate  Outcome: Progressing  Goal: Fluid balance maintained  Description: INTERVENTIONS:  - Monitor labs   - Monitor I/O and WT  - Instruct patient on fluid and nutrition as appropriate  - Assess for signs & symptoms of volume excess or deficit  Outcome: Progressing

## 2024-12-24 NOTE — SPEECH THERAPY NOTE
Speech Language Pathology Discontinue Note  Pt now Level 4- Comfort.  Pleasure feeds initiated.  Dysphagia intervention no longer warranted at this time.  SLP will sign off.  Please reorder should pt status/plan of care change.       Leann Lara MS, CCC-SLP  Speech-Language Pathologist  [place] #[id number]  [place] #[id number]

## 2024-12-24 NOTE — PROGRESS NOTES
Cardiology Progress Note - Tom Stewart 89 y.o. male MRN: [id number]    Unit/Bed#: 2 E 271-01 Encounter: [id number]      Assessment & Plan  Acute on chronic heart failure with preserved ejection fraction (HCC)  Wt Readings from Last 3 Encounters:   12/23/24 97.4 kg (214 lb 11.7 oz)   12/11/24 96.1 kg (211 lb 13.8 oz)   12/02/24 96.6 kg (213 lb)   Hypervolemic status improving.  I/O net -9.4 L, creatinine trending down.  Diuretics per nephrology given CKD, currently on Bumex 4 mg IV twice daily.  Recommend strict I/O's, daily weights, and sodium restriction.  CAD s/p PCI to LAD  Cardiac catheterization in 1/2020 revealed 90% ISR of mLAD and dLAD.  Patient received PCI with balloon angioplasty and CONCEPCION to each of the lesions.     Continue ASA and Toprol-XL.  Not on statin due to history of intolerance (hives).  Paroxysmal atrial fibrillation (HCC)  HR is well-controlled, continue Toprol-XL.  [id number] at least 7, continue Eliquis 2.5 mg bid (age, creatinine).  Moderate aortic stenosis  Mod-severe aortic stenosis per recent TTE.  Patient/family previously opted for conservative management given advanced age in setting of multiple comorbidities including dementia.  Primary hypertension  Continue Toprol-XL, lisinopril, nifedipine, and Bumex.  Mixed hyperlipidemia  Not on statin due to history of allergy (hives), continue dietary control.  Type 2 diabetes mellitus with stage 4 chronic kidney disease, without long-term current use of insulin (HCC)  Lab Results   Component Value Date    HGBA1C 8.5 (H) 12/06/2024   Management per primary service.  Stage 4 chronic kidney disease (HCC)  Lab Results   Component Value Date    EGFR 30 12/23/2024    EGFR 24 12/22/2024    EGFR 23 12/22/2024    CREATININE 1.91 (H) 12/23/2024    CREATININE 2.32 (H) 12/22/2024    CREATININE 2.37 (H) 12/22/2024   Nephrology recommendations appreciated.  Other Alzheimer's disease (HCC)  Management per SLIM  History of CVA  On ASA and  "Eliquis.      Update: Patient transitioned to comfort care, cardiology will sign off at this time.        Subjective:   Patient seen and examined.  No significant events overnight.  Patient resting in bed comfortably.  Denies any new complaints at this time.  All questions were answered.    Objective:     Vitals: Blood pressure 124/61, pulse 81, temperature 98.2 °F (36.8 °C), temperature source Oral, resp. rate 20, height 5' 10\" (1.778 m), weight 97.4 kg (214 lb 11.7 oz), SpO2 90%., Body mass index is 30.81 kg/m².,   Orthostatic Blood Pressures      Flowsheet Row Most Recent Value   Blood Pressure 124/61 filed at 12/23/2024 2251   Patient Position - Orthostatic VS Sitting filed at 12/23/2024 2251              Intake/Output Summary (Last 24 hours) at 12/24/2024 1431  Last data filed at 12/23/2024 2051  Gross per 24 hour   Intake --   Output 700 ml   Net -700 ml         Physical Exam:  GEN: Alert and in no acute distress.  Ill appearing and well nourished.   HEENT: Sclera anicteric, conjunctivae pink, mucous membranes moist. Oropharynx clear.   NECK: Supple, no carotid bruits, no significant JVD. Trachea midline, no thyromegaly.   HEART: Regular rhythm, normal S1 and S2, 3/6 LINDSAY, no clicks, gallops or rubs. PMI nondisplaced, no thrills.   LUNGS: Decreased basilar breath sounds bilaterally; no wheezes, rales, or rhonchi. No increased work of breathing or signs of respiratory distress.   ABDOMEN: Soft, nontender, nondistended, normoactive bowel sounds.   EXTREMITIES: Skin warm and well perfused, no clubbing or cyanosis, + BL LE edema.  NEURO: No focal findings. Normal speech. Mood and affect normal.   SKIN: Normal without suspicious lesions on exposed skin.        Medications:    No current facility-administered medications for this encounter.    Current Outpatient Medications:     haloperidol (HALDOL) oral concentrated solution, Take 0.5 mL (1 mg total) by mouth every 6 (six) hours as needed for agitation, Disp: 30 mL, " "Rfl: 0    LORazepam (ATIVAN) 2 mg/mL concentrated solution, Take 0.5 mL (1 mg total) by mouth every 4 (four) hours as needed for anxiety (restlessness) for up to 10 days, Disp: 30 mL, Rfl: 0    Blood Glucose Monitoring Suppl (ONE TOUCH ULTRA MINI) w/Device KIT, Use daily, Disp: 1 kit, Rfl: 0     Labs & Results:     Results from last 7 days   Lab Units 24  2222 24  1932 24  1749   HS TNI 0HR ng/L  --   --  95*   HS TNI 2HR ng/L  --  93*  --    HSTNI D2 ng/L  --  -2  --    HS TNI 4HR ng/L 93*  --   --    HSTNI D4 ng/L -2  --   --      Results from last 7 days   Lab Units 24  0544 24  0513 24  0547   WBC Thousand/uL 5.45 5.76 5.79   HEMOGLOBIN g/dL 10.2* 10.1* 10.6*   HEMATOCRIT % 33.2* 32.5* 34.6*   PLATELETS Thousands/uL 179 163 162         Results from last 7 days   Lab Units 24  0825 24  1019 24  0508 24  1749   POTASSIUM mmol/L 3.6 3.7 3.4*   < > 4.6   CHLORIDE mmol/L 91* 87* 89*   < > 92*   CO2 mmol/L 42* 40* 39*   < > 32   BUN mg/dL 54* 60* 61*   < > 59*   CREATININE mg/dL 1.91* 2.32* 2.37*   < > 2.47*   CALCIUM mg/dL 9.1 9.4 9.4   < > 8.8   ALK PHOS U/L  --   --   --   --  58   ALT U/L  --   --   --   --  16   AST U/L  --   --   --   --  14    < > = values in this interval not displayed.         Results from last 7 days   Lab Units 24  0825 24  1019   MAGNESIUM mg/dL 1.9 2.0 2.0       Vitals: Blood pressure 124/61, pulse 81, temperature 98.2 °F (36.8 °C), temperature source Oral, resp. rate 20, height 5' 10\" (1.778 m), weight 97.4 kg (214 lb 11.7 oz), SpO2 90%., Body mass index is 30.81 kg/m².,   Orthostatic Blood Pressures      Flowsheet Row Most Recent Value   Blood Pressure 124/61 filed at 2024 2251   Patient Position - Orthostatic VS Sitting filed at 2024 2251            Systolic (24hrs), Av , Min:124 , Max:161     Diastolic (24hrs), Av, Min:61, Max:71        Intake/Output Summary (Last " 24 hours) at 12/24/2024 1431  Last data filed at 12/23/2024 2051  Gross per 24 hour   Intake --   Output 700 ml   Net -700 ml       Invasive Devices       None                         BP Readings from Last 3 Encounters:   12/23/24 124/61   12/12/24 151/72   12/02/24 120/70      Wt Readings from Last 3 Encounters:   12/23/24 97.4 kg (214 lb 11.7 oz)   12/11/24 96.1 kg (211 lb 13.8 oz)   12/02/24 96.6 kg (213 lb)

## 2024-12-24 NOTE — ASSESSMENT & PLAN NOTE
"Wt Readings from Last 3 Encounters:   12/23/24 97.4 kg (214 lb 11.7 oz)   12/11/24 96.1 kg (211 lb 13.8 oz)   12/02/24 96.6 kg (213 lb)     82-year-old male with history of CHF recently discharged approximately 1 week ago after acute CHF exacerbation reports worsening shortness of breath and peripheral edema  Recent echo revealed EF of 35% on December 8th  Currently on IV Bumex 4 mg twice daily, I's and O's -7.9 L net negative  Status post Diuril per nephrology on 12/22/2024  Clinically volume overloaded  He gets episodes where he seems to be in respiratory distress stating\" I cannot breathe\", if you adjust his oxygen he saturates up to 97-98% with 4 L nasal cannula, or if nasal cannula gets displaced his saturation does drop to the 80s.  He also seem to have anxiety.  Discussed with son who agrees patient does have anxiety.  Repeat CT chest showed pulmonary edema and possible multifocal pneumonia, given elevated Pro-Humble will also cover with antibiotic  speech and swallow eval  Cardiology and nephrology following  Considering poor response to diuretics and overall prognosis being poor, palliative care has been consulted.  Goals of care discussions have been initiated with the family- chose [facility] (reading )      "

## 2024-12-24 NOTE — NUTRITION
12/24/24 0826   PES Statement   Problem No nutrition diagnosis   Recommendations/Interventions   Summary Patient remains on Regular diet for pleasure feeds only. Patient has transitioned to level 4 comfort care measures. No further nutrition interventions required at this time.   Interventions/Recommendations Continue current diet order   Patient Nutrition Goals   Goal Status Not met;D/c'd   Nutrition Complexity Risk   Nutrition complexity level Sign off   Follow up date 01/07/25

## 2025-01-07 DIAGNOSIS — I25.10 CORONARY ARTERY DISEASE INVOLVING NATIVE CORONARY ARTERY OF NATIVE HEART WITHOUT ANGINA PECTORIS: ICD-10-CM

## 2025-01-07 DIAGNOSIS — I10 ESSENTIAL HYPERTENSION: ICD-10-CM

## 2025-01-07 RX ORDER — LISINOPRIL 20 MG/1
20 TABLET ORAL DAILY
Qty: 90 TABLET | Refills: 3 | OUTPATIENT
Start: 2025-01-07

## 2025-01-07 RX ORDER — ISOSORBIDE DINITRATE 10 MG/1
10 TABLET ORAL 2 TIMES DAILY
Qty: 180 TABLET | Refills: 3 | OUTPATIENT
Start: 2025-01-07

## 2025-04-16 NOTE — PROGRESS NOTES
If any increase or worsening return to the ED.   Progress Note - Hospitalist   Name: Tom Stewart 89 y.o. male I MRN: [id number]  Unit/Bed#: 2 E 264-01 I Date of Admission: 12/6/2024   Date of Service: 12/11/2024 I Hospital Day: 5    Assessment & Plan  Acute on chronic heart failure with preserved ejection fraction (HCC)  Wt Readings from Last 3 Encounters:   12/11/24 96.1 kg (211 lb 13.8 oz)   12/02/24 96.6 kg (213 lb)   10/30/24 91.6 kg (201 lb 15.1 oz)     Lab Results   Component Value Date    BNP 2,615 (H) 12/06/2024      Secondary to due to fluid overload.  Patient presents with lower extremity edema and fluid extravasation from the skin.  Significant weight gain compared to last office visit 4 days ago.  Patient supposed to take Lasix 80 mg twice a day.  Patient lives in a facility, per son he is compliant with meds as well as diet.  Follows with cardiology and last visit on 12/2 patient was euvolemic.  Plan:  Monitor strict I's and O's and daily weight  Net neg. 4.6L. Wt 213 -4lbs   Cardiac diet with fluid restriction  Cardiology consulted  Transitioned IV to p.o. Bumex twice daily/11  Home Lasix dose is 80 mg twice daily  Patient experiencing mild confusion likely due to diuresis vs hospital delirium   Echocardiogram LVEF now 35% with severe diastolic dysfunction.  Moderate to severe aortic stenosis mild to moderate mitral regurgitation.  EF is now reduced from 50% in 2023   Coronary artery disease involving native coronary artery of native heart without angina pectoris  History of ischemic heart disease.  S/p PCI in 2020.  Continue aspirin, metoprolol  Per cardiology notes patient allergic to statin  Paroxysmal atrial fibrillation (HCC)  Patient in A-fib with rate controlled on admission  Patient takes Eliquis 2.5 mg twice a day.   Continue metoprolol with hold parameters  Continue home regimen  Type 2 diabetes mellitus with stage 4 chronic kidney disease, without long-term current use of insulin ([facility])  Lab Results   Component Value Date    HGBA1C 8.5  (H) 12/06/2024       Recent Labs     12/10/24  1636 12/10/24  2051 12/11/24  0744 12/11/24  1045   POCGLU 360* 304* 180* 263*       Blood Sugar Average: Last 72 hrs:  (P) [id number]  History of diabetes patient takes glipizide.  No insulin.  Continue insulin sliding scale.  Monitor for Accu-Cheks  Hypoglycemia protocol and diabetic diet    Stage 4 chronic kidney disease (HCC)  Lab Results   Component Value Date    EGFR 29 12/11/2024    EGFR 29 12/10/2024    EGFR 33 12/09/2024    CREATININE 1.98 (H) 12/11/2024    CREATININE 1.97 (H) 12/10/2024    CREATININE 1.79 (H) 12/09/2024     Baseline is 1.9-2.5; stable  Continue monitoring in the setting of IV diuretics.  Creatinine appears to be responding to IV diuresis  Avoid nephrotoxic agents and hypoperfusion.  Monitor I's and O's  Primary hypertension  Continue home blood pressure medications  Routine monitoring  Other Alzheimer's disease (HCC)  Patient with baseline dementia.  On assessment pleasantly confused.  Moderate aortic stenosis  Echocardiogram showing moderate to severe aortic stenosis   Appreciate recommendations.  Acute respiratory failure with hypoxia ([facility])  Patient son reports that patient used to be off of oxygen 1 to lift at home and ever since moved to facility his being on 1 to 2 L.  Currently requiring 1 to 2 L on admission.  Currently on 2L with SpO2 at 98%  Chest x-ray with interstitial edema per my reading pending final radiology reading  continue with IV diuretics and hopefully wean patient off oxygen if possible.  If not patient may require home oxygen evaluation    CAD s/p PCI to LAD      VTE Pharmacologic Prophylaxis: VTE Score: 5 High Risk (Score >/= 5) - Pharmacological DVT Prophylaxis Ordered: apixaban (Eliquis). Sequential Compression Devices Ordered.    Mobility:   Basic Mobility Inpatient Raw Score: 15  JH-HLM Goal: 4: Move to chair/commode  JH-HLM Achieved: 1: Laying in bed  JH-HLM Goal achieved. Continue to encourage  appropriate mobility.    Patient Centered Rounds: I performed bedside rounds with nursing staff today.   Discussions with Specialists or Other Care Team Provider: Wound Care, Cardiology, Case Management     Education and Discussions with Family / Patient: Updated  (granddaughter ) via phone.    Current Length of Stay: 5 day(s)  Current Patient Status: Inpatient   Certification Statement: The patient will continue to require additional inpatient hospital stay due to continuation of IV diuresis for heart failure exacerbation    Discharge Plan: Anticipate discharge in 24-48 hrs to rehab facility.    Code Status: Level 3 - DNAR and DNI    Subjective   Patient looks great today he is out of bed in the chair he just completed his ADLs with nursing reports that he did get short of breath walking to the restroom but reports that he recovered quickly is complaining of lower extremity edema still no dyspnea at rest    Objective :  Temp:  [98.6 °F (37 °C)-98.7 °F (37.1 °C)] 98.6 °F (37 °C)  HR:  [57-66] 66  BP: (126-150)/(66-76) 141/66  Resp:  [18] 18  SpO2:  [94 %-97 %] 94 %  O2 Device: None (Room air)    Body mass index is 30.4 kg/m².     Input and Output Summary (last 24 hours):     Intake/Output Summary (Last 24 hours) at 12/11/2024 1100  Last data filed at 12/11/2024 1053  Gross per 24 hour   Intake 900 ml   Output 950 ml   Net -50 ml       Physical Exam  Vitals and nursing note reviewed.   Constitutional:       General: He is not in acute distress.     Appearance: Normal appearance.   Cardiovascular:      Rate and Rhythm: Normal rate and regular rhythm.      Pulses: Normal pulses.      Heart sounds: Normal heart sounds. No murmur heard.  Pulmonary:      Effort: Pulmonary effort is normal. No respiratory distress.      Breath sounds: Normal breath sounds.   Abdominal:      General: Bowel sounds are normal.   Musculoskeletal:      Right lower leg: Edema present.      Left lower leg: Edema present.      Right  ankle: Tenderness present.      Left ankle: Tenderness present.      Comments: Mild ulcerations of the lower left ankle present.   Skin:     General: Skin is warm and dry.   Neurological:      Mental Status: He is alert and oriented to person, place, and time. He is confused.   Psychiatric:         Mood and Affect: Mood normal.         Speech: Speech is delayed.         Behavior: Behavior is slowed.         Judgment: Judgment normal.         Lab Results: I have reviewed the following results:   Results from last 7 days   Lab Units 12/11/24  0955   WBC Thousand/uL 6.86   HEMOGLOBIN g/dL 10.9*   HEMATOCRIT % 36.2*   PLATELETS Thousands/uL 141*   SEGS PCT % 80*   LYMPHO PCT % 12*   MONO PCT % 5   EOS PCT % 3     Results from last 7 days   Lab Units 12/11/24  0955 12/07/24  0555 12/06/24  1803   SODIUM mmol/L 133*   < > 133*   POTASSIUM mmol/L 4.0   < > 4.5   CHLORIDE mmol/L 89*   < > 93*   CO2 mmol/L 38*   < > 34*   BUN mg/dL 52*   < > 61*   CREATININE mg/dL 1.98*   < > 2.15*   ANION GAP mmol/L 6   < > 6   CALCIUM mg/dL 8.8   < > 8.9   ALBUMIN g/dL  --   --  3.4*   TOTAL BILIRUBIN mg/dL  --   --  0.83   ALK PHOS U/L  --   --  40   ALT U/L  --   --  11   AST U/L  --   --  10*   GLUCOSE RANDOM mg/dL 274*   < > 352*    < > = values in this interval not displayed.         Results from last 7 days   Lab Units 12/11/24  1045 12/11/24  0744 12/10/24  2051 12/10/24  1636 12/10/24  1116 12/10/24  0729 12/09/24  2059 12/09/24  1631 12/09/24  1042 12/09/24  0733 12/08/24  2101 12/08/24  1512   POC GLUCOSE mg/dl 263* 180* 304* 360* 279* 184* 263* 334* 241* 194* 272* 302*     Results from last 7 days   Lab Units 12/06/24  2124   HEMOGLOBIN A1C % 8.5*           Recent Cultures (last 7 days):         Imaging Results Review: I reviewed radiology reports from this admission including: xray(s).  Other Study Results Review: No additional pertinent studies reviewed.    Last 24 Hours Medication List:     Current Facility-Administered  Medications:     aluminum-magnesium hydroxide-simethicone (MAALOX) oral suspension 30 mL, Q4H PRN    apixaban (ELIQUIS) tablet 2.5 mg, BID    aspirin (ECOTRIN LOW STRENGTH) EC tablet 81 mg, Daily    bumetanide (BUMEX) tablet 2 mg, BID    Cholecalciferol (VITAMIN D3) tablet 1,000 Units, Daily    docusate sodium (COLACE) capsule 100 mg, BID    finasteride (PROSCAR) tablet 5 mg, Daily    fish oil capsule 1,000 mg, Daily    gabapentin (NEURONTIN) capsule 100 mg, BID    insulin lispro (HumALOG/ADMELOG) 100 units/mL subcutaneous injection 1-6 Units, TID AC **AND** Fingerstick Glucose (POCT), 4x Daily AC and at bedtime    lisinopril (ZESTRIL) tablet 20 mg, Daily    melatonin tablet 3 mg, HS    metoprolol succinate (TOPROL-XL) 24 hr tablet 50 mg, Daily    NIFEdipine (PROCARDIA XL) 24 hr tablet 60 mg, Daily    polyethylene glycol (MIRALAX) packet 17 g, Daily PRN    senna (SENOKOT) tablet 17.2 mg, HS    sodium chloride (OCEAN) 0.65 % nasal spray 2 spray, 4x Daily PRN    Administrative Statements   Today, Patient Was Seen By: JALEEL Healy  I have spent a total time of 42 minutes in caring for this patient on the day of the visit/encounter including Instructions for management, Patient and family education, Counseling / Coordination of care, Documenting in the medical record, Reviewing / ordering tests, medicine, procedures  , Obtaining or reviewing history  , and Communicating with other healthcare professionals .    **Please Note: This note may have been constructed using a voice recognition system.**